# Patient Record
Sex: MALE | Race: BLACK OR AFRICAN AMERICAN | NOT HISPANIC OR LATINO | Employment: OTHER | URBAN - METROPOLITAN AREA
[De-identification: names, ages, dates, MRNs, and addresses within clinical notes are randomized per-mention and may not be internally consistent; named-entity substitution may affect disease eponyms.]

---

## 2019-02-12 PROBLEM — I50.23: Status: ACTIVE | Noted: 2019-02-12

## 2019-02-13 PROBLEM — I42.8 NON-ISCHEMIC CARDIOMYOPATHY: Status: ACTIVE | Noted: 2019-02-13

## 2019-05-15 ENCOUNTER — TELEPHONE (OUTPATIENT)
Dept: TRANSPLANT | Facility: CLINIC | Age: 54
End: 2019-05-15

## 2019-05-15 DIAGNOSIS — I50.9 CONGESTIVE HEART FAILURE, UNSPECIFIED HF CHRONICITY, UNSPECIFIED HEART FAILURE TYPE: Primary | ICD-10-CM

## 2019-05-17 NOTE — TELEPHONE ENCOUNTER
REFERRAL NOTE:    Saba Lott has been referred to the pre-heart transplant office for consideration for orthotopic heart transplantation by Miko Catalan. Patient's appointments have been scheduled for 05/28/19. Information was provided and questions were answered. AHF/ Transplant Handout, appointment letter and campus map was mailed to the patient. My contact information was given. Call PRN. Referring provider informed via fax of appointment as scheduled with Dr Shaikh.

## 2019-05-28 ENCOUNTER — INITIAL CONSULT (OUTPATIENT)
Dept: TRANSPLANT | Facility: CLINIC | Age: 54
End: 2019-05-28
Payer: MEDICARE

## 2019-05-28 ENCOUNTER — HOSPITAL ENCOUNTER (OUTPATIENT)
Dept: CARDIOLOGY | Facility: CLINIC | Age: 54
Discharge: HOME | End: 2019-05-28
Attending: INTERNAL MEDICINE
Payer: MEDICAID

## 2019-05-28 ENCOUNTER — HOSPITAL ENCOUNTER (OUTPATIENT)
Dept: PULMONOLOGY | Facility: CLINIC | Age: 54
Discharge: HOME | End: 2019-05-28
Payer: MEDICAID

## 2019-05-28 VITALS
SYSTOLIC BLOOD PRESSURE: 98 MMHG | HEIGHT: 66 IN | HEART RATE: 74 BPM | WEIGHT: 218 LBS | BODY MASS INDEX: 35.03 KG/M2 | DIASTOLIC BLOOD PRESSURE: 62 MMHG

## 2019-05-28 VITALS
BODY MASS INDEX: 31.82 KG/M2 | SYSTOLIC BLOOD PRESSURE: 103 MMHG | HEART RATE: 73 BPM | OXYGEN SATURATION: 97 % | DIASTOLIC BLOOD PRESSURE: 66 MMHG | BODY MASS INDEX: 32.59 KG/M2 | WEIGHT: 198 LBS | HEIGHT: 66 IN | WEIGHT: 202.81 LBS | HEIGHT: 66 IN

## 2019-05-28 DIAGNOSIS — I50.9 CONGESTIVE HEART FAILURE, UNSPECIFIED HF CHRONICITY, UNSPECIFIED HEART FAILURE TYPE: ICD-10-CM

## 2019-05-28 DIAGNOSIS — M79.604 PAIN OF RIGHT LOWER EXTREMITY: ICD-10-CM

## 2019-05-28 DIAGNOSIS — I10 ESSENTIAL HYPERTENSION: ICD-10-CM

## 2019-05-28 DIAGNOSIS — I50.23 SYSTOLIC DYSFUNCTION WITH ACUTE ON CHRONIC HEART FAILURE: ICD-10-CM

## 2019-05-28 DIAGNOSIS — I82.401 ACUTE DEEP VEIN THROMBOSIS (DVT) OF RIGHT LOWER EXTREMITY, UNSPECIFIED VEIN: ICD-10-CM

## 2019-05-28 DIAGNOSIS — I50.22 CHRONIC SYSTOLIC CONGESTIVE HEART FAILURE: Primary | ICD-10-CM

## 2019-05-28 DIAGNOSIS — I42.8 NON-ISCHEMIC CARDIOMYOPATHY: ICD-10-CM

## 2019-05-28 DIAGNOSIS — Z95.810 AICD (AUTOMATIC CARDIOVERTER/DEFIBRILLATOR) PRESENT: ICD-10-CM

## 2019-05-28 LAB
ASCENDING AORTA: 2.88 CM
AV INDEX (PROSTH): 0.66
AV MEAN GRADIENT: 1.74 MMHG
AV PEAK GRADIENT: 2.76 MMHG
AV VALVE AREA: 2.1 CM2
AV VELOCITY RATIO: 0.71
BSA FOR ECHO PROCEDURE: 2.15 M2
CV ECHO LV RWT: 0.23 CM
DOP CALC AO PEAK VEL: 0.83 M/S
DOP CALC AO VTI: 14.86 CM
DOP CALC LVOT AREA: 3.2 CM2
DOP CALC LVOT DIAMETER: 2.02 CM
DOP CALC LVOT PEAK VEL: 0.59 M/S
DOP CALC LVOT STROKE VOLUME: 31.2 CM3
DOP CALCLVOT PEAK VEL VTI: 9.74 CM
E WAVE DECELERATION TIME: 73.99 MSEC
E/A RATIO: 2.7
E/E' RATIO: 13.27
ECHO LV POSTERIOR WALL: 0.75 CM (ref 0.6–1.1)
FRACTIONAL SHORTENING: 7 % (ref 28–44)
INTERVENTRICULAR SEPTUM: 0.71 CM (ref 0.6–1.1)
IVRT: 0.07 MSEC
LA MAJOR: 5.6 CM
LA MINOR: 5.95 CM
LA WIDTH: 4.86 CM
LEFT ATRIUM SIZE: 4.32 CM
LEFT ATRIUM VOLUME INDEX: 49.6 ML/M2
LEFT ATRIUM VOLUME: 102.97 CM3
LEFT INTERNAL DIMENSION IN SYSTOLE: 6.13 CM (ref 2.1–4)
LEFT VENTRICLE DIASTOLIC VOLUME INDEX: 108.39 ML/M2
LEFT VENTRICLE DIASTOLIC VOLUME: 224.89 ML
LEFT VENTRICLE MASS INDEX: 95.5 G/M2
LEFT VENTRICLE SYSTOLIC VOLUME INDEX: 91.2 ML/M2
LEFT VENTRICLE SYSTOLIC VOLUME: 189.12 ML
LEFT VENTRICULAR INTERNAL DIMENSION IN DIASTOLE: 6.62 CM (ref 3.5–6)
LEFT VENTRICULAR MASS: 198.11 G
LV LATERAL E/E' RATIO: 10.43
LV SEPTAL E/E' RATIO: 18.25
MV PEAK A VEL: 0.27 M/S
MV PEAK E VEL: 0.73 M/S
PISA TR MAX VEL: 2.69 M/S
RA MAJOR: 5.04 CM
RA WIDTH: 4.74 CM
RIGHT VENTRICULAR END-DIASTOLIC DIMENSION: 4.86 CM
RV TISSUE DOPPLER FREE WALL SYSTOLIC VELOCITY 1 (APICAL 4 CHAMBER VIEW): 9.86 M/S
SINUS: 3.03 CM
STJ: 2.73 CM
TDI LATERAL: 0.07
TDI SEPTAL: 0.04
TDI: 0.06
TR MAX PG: 28.94 MMHG
TRICUSPID ANNULAR PLANE SYSTOLIC EXCURSION: 1.41 CM

## 2019-05-28 PROCEDURE — 93306 TTE W/DOPPLER COMPLETE: CPT | Mod: 26,S$PBB,NTX, | Performed by: INTERNAL MEDICINE

## 2019-05-28 PROCEDURE — 99999 PR PBB SHADOW E&M-EST. PATIENT-LVL III: ICD-10-PCS | Mod: PBBFAC,TXP,, | Performed by: INTERNAL MEDICINE

## 2019-05-28 PROCEDURE — 94618 PULMONARY STRESS TESTING: CPT | Mod: 26,S$PBB,NTX, | Performed by: INTERNAL MEDICINE

## 2019-05-28 PROCEDURE — C8929 TTE W OR WO FOL WCON,DOPPLER: HCPCS | Mod: PBBFAC,NTX | Performed by: INTERNAL MEDICINE

## 2019-05-28 PROCEDURE — 99204 OFFICE O/P NEW MOD 45 MIN: CPT | Mod: S$PBB,TXP,, | Performed by: INTERNAL MEDICINE

## 2019-05-28 PROCEDURE — 99213 OFFICE O/P EST LOW 20 MIN: CPT | Mod: PBBFAC,TXP,25 | Performed by: INTERNAL MEDICINE

## 2019-05-28 PROCEDURE — 99999 PR PBB SHADOW E&M-EST. PATIENT-LVL III: CPT | Mod: PBBFAC,TXP,, | Performed by: INTERNAL MEDICINE

## 2019-05-28 PROCEDURE — 94618 PULMONARY STRESS TESTING: CPT | Mod: PBBFAC,NTX | Performed by: INTERNAL MEDICINE

## 2019-05-28 PROCEDURE — 93306 TRANSTHORACIC ECHO (TTE) COMPLETE (CUPID ONLY): ICD-10-PCS | Mod: 26,S$PBB,NTX, | Performed by: INTERNAL MEDICINE

## 2019-05-28 PROCEDURE — 99204 PR OFFICE/OUTPT VISIT, NEW, LEVL IV, 45-59 MIN: ICD-10-PCS | Mod: S$PBB,TXP,, | Performed by: INTERNAL MEDICINE

## 2019-05-28 PROCEDURE — 94618 PULMONARY STRESS TESTING: ICD-10-PCS | Mod: 26,S$PBB,NTX, | Performed by: INTERNAL MEDICINE

## 2019-05-28 RX ORDER — CYCLOBENZAPRINE HCL 10 MG
TABLET ORAL
Status: ON HOLD | COMMUNITY
Start: 2019-03-14 | End: 2020-02-27 | Stop reason: HOSPADM

## 2019-05-28 RX ORDER — FAMOTIDINE 20 MG/1
TABLET, FILM COATED ORAL
Refills: 0 | Status: ON HOLD | COMMUNITY
Start: 2019-04-17 | End: 2020-02-27 | Stop reason: HOSPADM

## 2019-05-28 RX ORDER — OXYCODONE AND ACETAMINOPHEN 10; 325 MG/1; MG/1
TABLET ORAL
Refills: 0 | Status: ON HOLD | COMMUNITY
Start: 2019-04-13 | End: 2020-02-27 | Stop reason: HOSPADM

## 2019-05-28 RX ORDER — BISOPROLOL FUMARATE 5 MG/1
TABLET, FILM COATED ORAL
Refills: 6 | COMMUNITY
Start: 2019-05-08 | End: 2019-05-28

## 2019-05-28 NOTE — PROCEDURES
Saba Lott is a 53 y.o.  male patient, who presents for a 6 minute walk test ordered by Fernando Shaikh MD.  The diagnosis is Pre Heart Transplant; Cardiomyopathy; Congestive Heart Failure.  The patient's BMI is 32 kg/m2.  Predicted distance (lower limit of normal) is 432.72 meters.      Test Results:    The test was completed without stopping.  The total time walked was 360 seconds.  During walking, the patient reported:  No complaints.  The patient used no assistive devices during testing.     05/28/2019---------Distance: 304.8 meters (1000 feet)     O2 Sat % Supplemental Oxygen Heart Rate Blood Pressure Nan Scale   Pre-exercise  (Resting) 98 % Room Air 69 bpm 102/68 mmHg 2   During Exercise 98 % Room Air 76 bpm 102/69 mmHg 2   Post-exercise  (Recovery) 98 % Room Air  72 bpm       Recovery Time:  54 seconds    Performing nurse/tech:  Suleiman PRESTON      PREVIOUS STUDY:   The patient has not had a previous study.      CLINICAL INTERPRETATION:  Six minute walk distance is 304.8 meters (1000 feet) with light dyspnea.  During exercise, there was no desaturation while breathing room air.  Both blood pressure and heart rate remained stable with walking.  The patient did not report non-pulmonary symptoms during exercise.  No previous study performed.  Based upon age and body mass index, exercise capacity is less than predicted.

## 2019-05-28 NOTE — PROGRESS NOTES
Subjective:    Patient ID:  Saba Lott is a 53 y.o. male who presents for evaluation of Atrial Fibrillation (Consult/AHF options Ref by Dr. Catalan/Lillie Wall)      HPI    54 yo BM with history of nonischemic CMP with EF 15-20% with chronic heart failure who was seen in clinic on 2/12/19 for ICD evaluation. Chronic MARC - Class II-III and mild LE edema.    Hospital Course (synopsis of major diagnoses, care, treatment, and services provided during the course of the hospital stay):  -2/12 admit to CDU for diuresis with IV lasix  -IV abx   -CXR with suspected small left pleural effusion with associated left basilar atelectasis verus evolving airspace disease  -2/13 single chamber ICD implant; IV lasix decreased to BID  -2/16 add dobutamine, hold BB due to hypotension, no ACE-I or ARB due to recent HPI hypotension  -2/17 Lasix changed to PO  -2/18 dobutamine discontinued      Echo today (5/28/2019)    · Moderate left ventricular enlargement.  · Severely decreased left ventricular systolic function. The estimated ejection fraction is 20%  · Global hypokinetic wall motion.  · Grade III (severe) left ventricular diastolic dysfunction consistent with restrictive physiology.  · Severe left atrial enlargement.  · Moderate right ventricular enlargement.  · Low normal right ventricular systolic function.  · Mild right atrial enlargement.  · Moderate mitral regurgitation.  · Mild to moderate tricuspid regurgitation.  Review of Systems   Constitution: Negative for chills, decreased appetite, diaphoresis, fever, malaise/fatigue, night sweats, weight gain and weight loss.   Cardiovascular: Positive for dyspnea on exertion and leg swelling. Negative for chest pain, claudication, cyanosis, irregular heartbeat, near-syncope, orthopnea and palpitations.   Respiratory: Negative for cough, hemoptysis, shortness of breath, sleep disturbances due to breathing, snoring, sputum production and wheezing.    Gastrointestinal: Negative for  abdominal pain and diarrhea.   Neurological: Negative for weakness.        Objective:    Physical Exam   Constitutional: He is oriented to person, place, and time. He appears well-developed and well-nourished.   HENT:   Head: Normocephalic and atraumatic.   Eyes: Pupils are equal, round, and reactive to light. Conjunctivae and EOM are normal.   Neck: Normal range of motion. Neck supple. No JVD present.   Cardiovascular: Normal rate and regular rhythm. PMI is displaced. Exam reveals no gallop and no friction rub.   No murmur heard.  Pulmonary/Chest: Breath sounds normal. No respiratory distress. He has no wheezes. He has no rales. He exhibits no tenderness.   Abdominal: Soft. Bowel sounds are normal. He exhibits no distension and no mass. There is no hepatosplenomegaly, splenomegaly or hepatomegaly. There is no tenderness. There is no rebound, no guarding and no CVA tenderness.   No hepatoslenomegaly   Musculoskeletal: Normal range of motion. He exhibits no edema or tenderness.   Neurological: He is alert and oriented to person, place, and time. He has normal reflexes. No cranial nerve deficit. He exhibits normal muscle tone. Coordination normal.   Skin: Skin is warm and dry.   Psychiatric: He has a normal mood and affect.         Assessment:       1. Chronic systolic congestive heart failure    2. Non-ischemic cardiomyopathy    3. Systolic dysfunction with acute on chronic heart failure    4. Pain of right lower extremity    5. Essential hypertension    6. AICD (automatic cardioverter/defibrillator) present    7. Acute deep vein thrombosis (DVT) of right lower extremity, unspecified vein         Plan:         HFrEF FC II III NYHA poor insight of his problem NOT on ACE ARB because of hyopetension    WE will order CPX and then decide future management    Seems to be in 2 betablockers (should be on COreg only) DC bisoprolol      RTC 1 month

## 2019-05-28 NOTE — LETTER
May 28, 2019        Miko Catalan  [address] [facility]  [place] [address]  Phone: [phone]  Fax: [phone]             [facility] [address] [place] [address]  Phone: [phone]   Patient: Saba Lott   MR Number: [id number]   YOB: 1965   Date of Visit: 5/28/2019       Dear Dr. Miko Catalan    Thank you for referring Saba Lott to me for evaluation. Attached you will find relevant portions of my assessment and plan of care.    If you have questions, please do not hesitate to call me. I look forward to following Saba Lott along with you.    Sincerely,    Fernando Shaikh MD    Enclosure    If you would like to receive this communication electronically, please contact [email or web address] or [phone] to request Secure[employer] Link access.    [employer]ure[employer] Link is a tool which provides read-only access to select patient information with whom you have a relationship. Its easy to use and provides real time access to review your patients record including encounter summaries, notes, results, and demographic information.    If you feel you have received this communication in error or would no longer like to receive these types of communications, please e-mail [email or web address]

## 2019-06-28 ENCOUNTER — TELEPHONE (OUTPATIENT)
Dept: TRANSPLANT | Facility: CLINIC | Age: 54
End: 2019-06-28

## 2019-06-28 NOTE — TELEPHONE ENCOUNTER
Patient missed followup appointment yesterday. Attempted to call patient to discuss and rescheduled. However, patient's vm full. Called backup number which was patient's father. He stated he would let patient know to call us back to reschedule.

## 2019-12-13 ENCOUNTER — TELEPHONE (OUTPATIENT)
Dept: TRANSPLANT | Facility: CLINIC | Age: 54
End: 2019-12-13

## 2019-12-13 NOTE — LETTER
[facility]  [address]  [facility] [address]  Phone: [phone]     01/08/19    Dear Dr ROSI Catalan,  Saba Lott was referred to the [facility] at [facility]. Multiple attempts have been made to contact and/ or schedule the patient for a follow up visit in our clinic. These attempts have been unsuccessful. We are closing the referral at this time;  however we welcome you to re-refer the patient as appropriate. Should you decide to place another referral, please fax a new referral request along with updated records to [phone]. A copy of this letter will also be sent to the patient at the address that we have on file. For questions, please don't hesitate to call us at [phone].    Thanks in advance,   Toni Ruano MD, [facility]  Medical Director, Pre-Transplant Program

## 2019-12-13 NOTE — TELEPHONE ENCOUNTER
Called to follow up and see if patient would like to schedule an appointment. Spoke with patient's wife who stated the patient is in the hospital at this time and she will talk to him and let us know. Contact information given.

## 2019-12-13 NOTE — TELEPHONE ENCOUNTER
----- Message from Toni Ruano MD sent at 12/10/2019  7:49 AM CST -----  We last saw him in May 2019   He was supposed to followup in one month with CPX but did not   Can we call him and see if he is coming back?

## 2019-12-18 NOTE — TELEPHONE ENCOUNTER
Called to check on and see if ready to schedule follow up appointment. No answer. Unable to leave message as voicemail was full.

## 2020-01-08 NOTE — TELEPHONE ENCOUNTER
Called to see if would like to schedule an appointment. Mailbox full and unable to leave message. On other number recording said call could not be completed at this time.

## 2020-01-15 ENCOUNTER — HOSPITAL ENCOUNTER (INPATIENT)
Facility: HOSPITAL | Age: 55
LOS: 43 days | Discharge: HOME HEALTH | DRG: 001 | End: 2020-02-27
Attending: INTERNAL MEDICINE | Admitting: INTERNAL MEDICINE
Payer: MEDICARE

## 2020-01-15 DIAGNOSIS — J95.89 ACUTE RESPIRATORY INSUFFICIENCY, POSTOPERATIVE: ICD-10-CM

## 2020-01-15 DIAGNOSIS — Z87.891 HISTORY OF TOBACCO ABUSE: ICD-10-CM

## 2020-01-15 DIAGNOSIS — Z01.818 PRE-TRANSPLANT EVALUATION FOR HEART TRANSPLANT: ICD-10-CM

## 2020-01-15 DIAGNOSIS — I50.9 LOW OUTPUT HEART FAILURE: ICD-10-CM

## 2020-01-15 DIAGNOSIS — Z95.811 PRESENCE OF LEFT VENTRICULAR ASSIST DEVICE (LVAD): ICD-10-CM

## 2020-01-15 DIAGNOSIS — R73.9 ACUTE HYPERGLYCEMIA: ICD-10-CM

## 2020-01-15 DIAGNOSIS — I50.9 HEART FAILURE: ICD-10-CM

## 2020-01-15 DIAGNOSIS — N17.9 AKI (ACUTE KIDNEY INJURY): ICD-10-CM

## 2020-01-15 DIAGNOSIS — R17 ELEVATED BILIRUBIN: ICD-10-CM

## 2020-01-15 DIAGNOSIS — I50.9 CHF (CONGESTIVE HEART FAILURE): ICD-10-CM

## 2020-01-15 DIAGNOSIS — D62 ANEMIA DUE TO ACUTE BLOOD LOSS: ICD-10-CM

## 2020-01-15 DIAGNOSIS — R09.89 ABNORMAL PULSE: ICD-10-CM

## 2020-01-15 DIAGNOSIS — L29.9 PRURITUS: ICD-10-CM

## 2020-01-15 DIAGNOSIS — Z76.82 ORGAN TRANSPLANT CANDIDATE: ICD-10-CM

## 2020-01-15 DIAGNOSIS — Z78.9 CENTRAL VENOUS CATHETER IN PLACE: ICD-10-CM

## 2020-01-15 DIAGNOSIS — I50.42 CHRONIC COMBINED SYSTOLIC AND DIASTOLIC CONGESTIVE HEART FAILURE: ICD-10-CM

## 2020-01-15 DIAGNOSIS — R57.0 CARDIOGENIC SHOCK: ICD-10-CM

## 2020-01-15 DIAGNOSIS — I50.43 ACUTE ON CHRONIC COMBINED SYSTOLIC AND DIASTOLIC HEART FAILURE: ICD-10-CM

## 2020-01-15 DIAGNOSIS — F10.11 HISTORY OF ALCOHOL ABUSE: ICD-10-CM

## 2020-01-15 DIAGNOSIS — Z45.09 ENCOUNTER FOR MANAGEMENT OF INTRA-AORTIC BALLOON PUMP: ICD-10-CM

## 2020-01-15 DIAGNOSIS — I42.8 NON-ISCHEMIC CARDIOMYOPATHY: ICD-10-CM

## 2020-01-15 DIAGNOSIS — Z51.5 PALLIATIVE CARE ENCOUNTER: ICD-10-CM

## 2020-01-15 DIAGNOSIS — E66.01 MORBID OBESITY: ICD-10-CM

## 2020-01-15 DIAGNOSIS — Z71.89 GOALS OF CARE, COUNSELING/DISCUSSION: ICD-10-CM

## 2020-01-15 DIAGNOSIS — D75.839 THROMBOCYTOSIS: ICD-10-CM

## 2020-01-15 DIAGNOSIS — Z95.810 AICD (AUTOMATIC CARDIOVERTER/DEFIBRILLATOR) PRESENT: ICD-10-CM

## 2020-01-15 DIAGNOSIS — I50.43 ACUTE ON CHRONIC SYSTOLIC AND DIASTOLIC HEART FAILURE, NYHA CLASS 4: ICD-10-CM

## 2020-01-15 DIAGNOSIS — I82.401 ACUTE DEEP VEIN THROMBOSIS (DVT) OF RIGHT LOWER EXTREMITY, UNSPECIFIED VEIN: ICD-10-CM

## 2020-01-15 DIAGNOSIS — I10 ESSENTIAL HYPERTENSION: ICD-10-CM

## 2020-01-15 DIAGNOSIS — Z79.01 ANTICOAGULATION MONITORING, INR RANGE 2-3: ICD-10-CM

## 2020-01-15 DIAGNOSIS — I82.421 DEEP VEIN THROMBOSIS (DVT) OF ILIAC VEIN OF RIGHT LOWER EXTREMITY, UNSPECIFIED CHRONICITY: ICD-10-CM

## 2020-01-15 DIAGNOSIS — Z71.89 ADVANCE CARE PLANNING: ICD-10-CM

## 2020-01-15 DIAGNOSIS — D72.829 LEUKOCYTOSIS, UNSPECIFIED TYPE: ICD-10-CM

## 2020-01-15 DIAGNOSIS — E44.0 MODERATE MALNUTRITION: ICD-10-CM

## 2020-01-15 DIAGNOSIS — Z95.811 LVAD (LEFT VENTRICULAR ASSIST DEVICE) PRESENT: ICD-10-CM

## 2020-01-15 LAB
ABO + RH BLD: NORMAL
ALBUMIN SERPL BCP-MCNC: 3 G/DL (ref 3.5–5.2)
ALLENS TEST: ABNORMAL
ALP SERPL-CCNC: 115 U/L (ref 55–135)
ALT SERPL W/O P-5'-P-CCNC: 19 U/L (ref 10–44)
ANION GAP SERPL CALC-SCNC: 12 MMOL/L (ref 8–16)
APTT BLDCRRT: 26.1 SEC (ref 21–32)
AST SERPL-CCNC: 26 U/L (ref 10–40)
BACTERIA #/AREA URNS AUTO: ABNORMAL /HPF
BASOPHILS # BLD AUTO: 0.05 K/UL (ref 0–0.2)
BASOPHILS NFR BLD: 0.7 % (ref 0–1.9)
BILIRUB SERPL-MCNC: 1.5 MG/DL (ref 0.1–1)
BILIRUB UR QL STRIP: NEGATIVE
BILIRUB UR QL STRIP: NEGATIVE
BLD GP AB SCN CELLS X3 SERPL QL: NORMAL
BNP SERPL-MCNC: 3525 PG/ML (ref 0–99)
BUN SERPL-MCNC: 33 MG/DL (ref 6–20)
CALCIUM SERPL-MCNC: 8.8 MG/DL (ref 8.7–10.5)
CHLORIDE SERPL-SCNC: 100 MMOL/L (ref 95–110)
CLARITY UR REFRACT.AUTO: ABNORMAL
CLARITY UR REFRACT.AUTO: ABNORMAL
CO2 SERPL-SCNC: 22 MMOL/L (ref 23–29)
COLOR UR AUTO: YELLOW
COLOR UR AUTO: YELLOW
CREAT SERPL-MCNC: 3 MG/DL (ref 0.5–1.4)
DIFFERENTIAL METHOD: ABNORMAL
EOSINOPHIL # BLD AUTO: 0.1 K/UL (ref 0–0.5)
EOSINOPHIL NFR BLD: 1 % (ref 0–8)
ERYTHROCYTE [DISTWIDTH] IN BLOOD BY AUTOMATED COUNT: 17 % (ref 11.5–14.5)
EST. GFR  (AFRICAN AMERICAN): 25.8 ML/MIN/1.73 M^2
EST. GFR  (NON AFRICAN AMERICAN): 22.3 ML/MIN/1.73 M^2
GLUCOSE SERPL-MCNC: 82 MG/DL (ref 70–110)
GLUCOSE UR QL STRIP: NEGATIVE
GLUCOSE UR QL STRIP: NEGATIVE
HCO3 UR-SCNC: 24.9 MMOL/L (ref 24–28)
HCT VFR BLD AUTO: 36.8 % (ref 40–54)
HGB BLD-MCNC: 12 G/DL (ref 14–18)
HGB UR QL STRIP: ABNORMAL
HGB UR QL STRIP: ABNORMAL
IMM GRANULOCYTES # BLD AUTO: 0.03 K/UL (ref 0–0.04)
IMM GRANULOCYTES NFR BLD AUTO: 0.4 % (ref 0–0.5)
INR PPP: 2 (ref 0.8–1.2)
KETONES UR QL STRIP: NEGATIVE
KETONES UR QL STRIP: NEGATIVE
LEUKOCYTE ESTERASE UR QL STRIP: ABNORMAL
LEUKOCYTE ESTERASE UR QL STRIP: ABNORMAL
LYMPHOCYTES # BLD AUTO: 1.1 K/UL (ref 1–4.8)
LYMPHOCYTES NFR BLD: 14.3 % (ref 18–48)
MAGNESIUM SERPL-MCNC: 1.6 MG/DL (ref 1.6–2.6)
MCH RBC QN AUTO: 27.5 PG (ref 27–31)
MCHC RBC AUTO-ENTMCNC: 32.6 G/DL (ref 32–36)
MCV RBC AUTO: 84 FL (ref 82–98)
MICROSCOPIC COMMENT: ABNORMAL
MONOCYTES # BLD AUTO: 0.7 K/UL (ref 0.3–1)
MONOCYTES NFR BLD: 9.1 % (ref 4–15)
NEUTROPHILS # BLD AUTO: 5.7 K/UL (ref 1.8–7.7)
NEUTROPHILS NFR BLD: 74.5 % (ref 38–73)
NITRITE UR QL STRIP: NEGATIVE
NITRITE UR QL STRIP: NEGATIVE
NRBC BLD-RTO: 0 /100 WBC
PCO2 BLDA: 41.6 MMHG (ref 35–45)
PH SMN: 7.38 [PH] (ref 7.35–7.45)
PH UR STRIP: 5 [PH] (ref 5–8)
PH UR STRIP: 5 [PH] (ref 5–8)
PHOSPHATE SERPL-MCNC: 5.2 MG/DL (ref 2.7–4.5)
PLATELET # BLD AUTO: 207 K/UL (ref 150–350)
PMV BLD AUTO: 10.9 FL (ref 9.2–12.9)
PO2 BLDA: 31 MMHG (ref 40–60)
POC BE: 0 MMOL/L
POC SATURATED O2: 59 % (ref 95–100)
POC TCO2: 26 MMOL/L (ref 24–29)
POTASSIUM SERPL-SCNC: 4.3 MMOL/L (ref 3.5–5.1)
PROT SERPL-MCNC: 6.4 G/DL (ref 6–8.4)
PROT UR QL STRIP: NEGATIVE
PROT UR QL STRIP: NEGATIVE
PROTHROMBIN TIME: 18.9 SEC (ref 9–12.5)
RBC # BLD AUTO: 4.36 M/UL (ref 4.6–6.2)
RBC #/AREA URNS AUTO: 45 /HPF (ref 0–4)
SAMPLE: ABNORMAL
SITE: ABNORMAL
SODIUM SERPL-SCNC: 134 MMOL/L (ref 136–145)
SP GR UR STRIP: 1 (ref 1–1.03)
SP GR UR STRIP: 1 (ref 1–1.03)
SQUAMOUS #/AREA URNS AUTO: 0 /HPF
URN SPEC COLLECT METH UR: ABNORMAL
URN SPEC COLLECT METH UR: ABNORMAL
WBC # BLD AUTO: 7.68 K/UL (ref 3.9–12.7)
WBC #/AREA URNS AUTO: 3 /HPF (ref 0–5)

## 2020-01-15 PROCEDURE — 80053 COMPREHEN METABOLIC PANEL: CPT

## 2020-01-15 PROCEDURE — 85730 THROMBOPLASTIN TIME PARTIAL: CPT

## 2020-01-15 PROCEDURE — 25000003 PHARM REV CODE 250: Performed by: STUDENT IN AN ORGANIZED HEALTH CARE EDUCATION/TRAINING PROGRAM

## 2020-01-15 PROCEDURE — 85610 PROTHROMBIN TIME: CPT

## 2020-01-15 PROCEDURE — 36415 COLL VENOUS BLD VENIPUNCTURE: CPT

## 2020-01-15 PROCEDURE — 86901 BLOOD TYPING SEROLOGIC RH(D): CPT

## 2020-01-15 PROCEDURE — 99900035 HC TECH TIME PER 15 MIN (STAT)

## 2020-01-15 PROCEDURE — 27000221 HC OXYGEN, UP TO 24 HOURS

## 2020-01-15 PROCEDURE — 83735 ASSAY OF MAGNESIUM: CPT

## 2020-01-15 PROCEDURE — 25000242 PHARM REV CODE 250 ALT 637 W/ HCPCS: Performed by: STUDENT IN AN ORGANIZED HEALTH CARE EDUCATION/TRAINING PROGRAM

## 2020-01-15 PROCEDURE — 63600175 PHARM REV CODE 636 W HCPCS: Performed by: STUDENT IN AN ORGANIZED HEALTH CARE EDUCATION/TRAINING PROGRAM

## 2020-01-15 PROCEDURE — 85025 COMPLETE CBC W/AUTO DIFF WBC: CPT

## 2020-01-15 PROCEDURE — 27100094 HC IABP, SET-UP: Mod: NTX

## 2020-01-15 PROCEDURE — 20000000 HC ICU ROOM

## 2020-01-15 PROCEDURE — 27000202 HC IABP, ADD'L DAY: Mod: NTX

## 2020-01-15 PROCEDURE — 99291 CRITICAL CARE FIRST HOUR: CPT | Mod: GC,,, | Performed by: INTERNAL MEDICINE

## 2020-01-15 PROCEDURE — 81001 URINALYSIS AUTO W/SCOPE: CPT

## 2020-01-15 PROCEDURE — 82803 BLOOD GASES ANY COMBINATION: CPT

## 2020-01-15 PROCEDURE — 83880 ASSAY OF NATRIURETIC PEPTIDE: CPT

## 2020-01-15 PROCEDURE — 99291 PR CRITICAL CARE, E/M 30-74 MINUTES: ICD-10-PCS | Mod: GC,,, | Performed by: INTERNAL MEDICINE

## 2020-01-15 PROCEDURE — 84100 ASSAY OF PHOSPHORUS: CPT

## 2020-01-15 RX ORDER — ALBUTEROL SULFATE 90 UG/1
2 AEROSOL, METERED RESPIRATORY (INHALATION) EVERY 6 HOURS PRN
Status: DISCONTINUED | OUTPATIENT
Start: 2020-01-15 | End: 2020-02-06

## 2020-01-15 RX ORDER — MORPHINE SULFATE 2 MG/ML
4 INJECTION, SOLUTION INTRAMUSCULAR; INTRAVENOUS EVERY 4 HOURS PRN
Status: DISCONTINUED | OUTPATIENT
Start: 2020-01-15 | End: 2020-01-17

## 2020-01-15 RX ORDER — PANTOPRAZOLE SODIUM 40 MG/1
40 TABLET, DELAYED RELEASE ORAL DAILY
Status: DISCONTINUED | OUTPATIENT
Start: 2020-01-16 | End: 2020-01-17

## 2020-01-15 RX ORDER — SODIUM CHLORIDE 0.9 % (FLUSH) 0.9 %
10 SYRINGE (ML) INJECTION
Status: DISCONTINUED | OUTPATIENT
Start: 2020-01-15 | End: 2020-02-06

## 2020-01-15 RX ORDER — DIPHENHYDRAMINE HCL 25 MG
25 CAPSULE ORAL ONCE
Status: COMPLETED | OUTPATIENT
Start: 2020-01-15 | End: 2020-01-15

## 2020-01-15 RX ORDER — POLYETHYLENE GLYCOL 3350 17 G/17G
17 POWDER, FOR SOLUTION ORAL DAILY
Status: DISCONTINUED | OUTPATIENT
Start: 2020-01-16 | End: 2020-02-06

## 2020-01-15 RX ORDER — BISACODYL 10 MG
10 SUPPOSITORY, RECTAL RECTAL DAILY PRN
Status: DISCONTINUED | OUTPATIENT
Start: 2020-01-15 | End: 2020-02-06

## 2020-01-15 RX ORDER — ATORVASTATIN CALCIUM 20 MG/1
40 TABLET, FILM COATED ORAL DAILY
Status: DISCONTINUED | OUTPATIENT
Start: 2020-01-16 | End: 2020-02-06

## 2020-01-15 RX ORDER — ACETAMINOPHEN 325 MG/1
650 TABLET ORAL EVERY 4 HOURS PRN
Status: DISCONTINUED | OUTPATIENT
Start: 2020-01-15 | End: 2020-01-17

## 2020-01-15 RX ORDER — HEPARIN SODIUM,PORCINE/D5W 25000/250
12 INTRAVENOUS SOLUTION INTRAVENOUS CONTINUOUS
Status: DISCONTINUED | OUTPATIENT
Start: 2020-01-15 | End: 2020-02-06

## 2020-01-15 RX ORDER — DOBUTAMINE HYDROCHLORIDE 400 MG/100ML
5 INJECTION, SOLUTION INTRAVENOUS CONTINUOUS
Status: DISCONTINUED | OUTPATIENT
Start: 2020-01-15 | End: 2020-02-06

## 2020-01-15 RX ORDER — OXYCODONE HYDROCHLORIDE 5 MG/1
5 TABLET ORAL EVERY 4 HOURS PRN
Status: DISCONTINUED | OUTPATIENT
Start: 2020-01-15 | End: 2020-01-17

## 2020-01-15 RX ORDER — FUROSEMIDE 10 MG/ML
80 INJECTION INTRAMUSCULAR; INTRAVENOUS ONCE
Status: COMPLETED | OUTPATIENT
Start: 2020-01-15 | End: 2020-01-15

## 2020-01-15 RX ADMIN — MORPHINE SULFATE 4 MG: 2 INJECTION, SOLUTION INTRAMUSCULAR; INTRAVENOUS at 09:01

## 2020-01-15 RX ADMIN — HEPARIN SODIUM AND DEXTROSE 12 UNITS/KG/HR: 10000; 5 INJECTION INTRAVENOUS at 10:01

## 2020-01-15 RX ADMIN — ALBUTEROL SULFATE 2 PUFF: 90 AEROSOL, METERED RESPIRATORY (INHALATION) at 10:01

## 2020-01-15 RX ADMIN — FUROSEMIDE 20 MG/HR: 10 INJECTION, SOLUTION INTRAMUSCULAR; INTRAVENOUS at 10:01

## 2020-01-15 RX ADMIN — DOBUTAMINE IN DEXTROSE 5 MCG/KG/MIN: 200 INJECTION, SOLUTION INTRAVENOUS at 09:01

## 2020-01-15 RX ADMIN — DIPHENHYDRAMINE HYDROCHLORIDE 25 MG: 25 CAPSULE ORAL at 10:01

## 2020-01-15 RX ADMIN — FUROSEMIDE 80 MG: 10 INJECTION, SOLUTION INTRAMUSCULAR; INTRAVENOUS at 10:01

## 2020-01-15 NOTE — LETTER
[address]  [facility] [address]  Phone: [phone]  Fax: [phone]                                  02/27/2020  [facility]  [address] [address]      To Whom It May Concern:    This letter is to inform you that one of our patients will be discharged to your community.  We want to let you know because this patient has special health needs.     This patient (whose information appears below) has a Heartmate 3 Left Ventricular Assist Device (LVAD) or blood pump.   The device is implanted inside the patients native heart.  It takes over pumping so that the patients organs and tissues get the oxygen-rich blood they need.  The VAD is a life-sustaining device.  The patients information is as follows:    Saba Lott  54 y.o. 1965  [address] [facility] [address]    This device runs on batteries and on AC power.  If the patient should present to you during a medical emergency, please contact us immediately at ([phone]) and ask to page VAD coordinator on call for VAD-specific emergency instructions. THIS PATIENT CAN HAVE CHEST COMPRESSIONS if medically necessary and CAN BE Defibrillated/DC Cardioverted without disconnecting the controller. Please DO NOT use a ALIYA  Device.  This device is fluid dependent so  cc to start if required.   THIS PATIENT MAY NOT HAVE A BLOOD PRESSURE OR A PALPABLE PULSE.     For additional information, visit [email or web address] for further VAD education materials.       Please contact one of the VAD coordinators with any further questions or concerns.  Umm Parish, RN, CCRN     [phone]   Cindy Armstrong, RN--[phone]   FRIEDA MarinN, RN--[phone]   JESSICA Cerda, RN, CCRN [phone]   VAD Office Fax: [phone]  Sincerely,      Zulma Floyd M.D.  Medical Director, Mechanical Circulatory Device Support Program  Section of Cardiomyopathy & Heart Transplantation

## 2020-01-15 NOTE — LETTER
[address]  [facility] [address]  Phone: [phone]  Fax: [phone]            02/27/2020  Dr. Jing Peña  [address]  [place] [address]    Dear Dr. Peña,    One of our patients was recently implanted with a Left Ventricular Assist Device (LVAD).  This device takes over the pumping function of the native hearts left ventricle.    I am contacting you because this patient (whose information appears below) is about to be discharged from the hospital and return home. This patient has identified you as the Primary Care Physician.  If the patient should present to you during a medical emergency, please contact us immediately for LVAD-specific emergency instructions.    Saba Lott  54 y.o. 1965  [address] [facility] [address]    I have included educational material regarding the LVAD for you and your staff.  This patient is fully trained to handle the LVAD properly.     For additional information, visit [email or web address] for further VAD education materials.       Please contact one of the VAD coordinators with any further questions or concerns.  Umm Parish, RN, CCRN     [phone]   Cindy Armstrong, RN--[phone]   FRIEDA MarinN, RN--[phone]   FRIEDA CerdaN, RN, CCRN [phone]   VAD Office Fax: [phone]  Sincerely,      Zulma Floyd M.D.  Medical Director, Mechanical Circulatory Device Support Program  Section of Cardiomyopathy & Heart Transplantation

## 2020-01-15 NOTE — LETTER
[address]  [facility] [address]  Phone: [phone]  Fax: [phone]          02/27/2020    [facility]        To Whom It May Concern:    This letter is to inform you that one of our patients will be discharged into your community.  We want to let you know because this patient has special healthcare needs.   As a potential , you may appreciate knowing in advance how to respond to potential emergencies concerning this patient.    The patient (whose contact information appears below) has a Heartmate 3 Left Ventricular Assist System (LVAD) or blood pump.  The device is implanted alongside the patients native heart.  It takes over the pumping function of the patients sick or weakened heart so that the patients lungs, organs, and tissues get the oxygen-rich blood they need.  The LVAD is a life-sustaining device. The patients information is as follows:    Saba Lott  54 y.o. 1965  [address] [facility] [address]    This device runs on batteries and AC power.   There is no hand-pumping.  There is a back up controller, batteries, battery charger and a Mobile power unit (MPU). If called to patient house, please take the emergency bag with the extra controller, batteries, and clips with patient to the hospital also, PAGE the VAD COORDINATOR on call immediately via the  [phone] for VAD-specific emergency instructions. This patient CAN have chest compressions and CAN be defibrillated/DC Cardioverted. Please DO NOT use a ALIYA Device. This device is fluid dependent so you may need to administer  cc to start.  THIS PATIENT MAY NOT HAVE A BLOOD PRESSURE OR A PALPABLE PULSE.  For additional information, visit [email or web address] for further VAD education materials.       Please contact one of the VAD coordinators with any further questions or concerns.  Umm Parish RN, CCRN     [phone]   Cindy Armstrong RN--[phone]   Lilly  Jennifer, FRIEDAN, RN--[phone]   JESSICA Cerda, RN, CCRN [phone]   VAD Office Fax: [phone]  Sincerely,      Zulma Floyd M.D.  Medical Director, Mechanical Circulatory Device Support Program  Section of Cardiomyopathy & Heart Transplantation

## 2020-01-15 NOTE — LETTER
[address]  [facility] [address]  Phone: [phone]  Fax: [phone]           02/27/2020  [facility] [address] [place] [address]    Dear Dr.Robert Catalan,    One of your patients was implanted with a Heartmate 3 Left Ventricular Assist Device (LVAD).  This device takes over the pumping function of the native hearts left ventricle.    I am contacting you because this patient (whose information appears below) is about to be discharged from the hospital and return home. This patient has identified you as the referring cardiologist.   If the patient should present to you during a medical emergency, please call [phone].    Saba Millser  54 y.o. 1965  [address] [address]                  I have included educational material regarding the LVAD for you and your staff.  This patient is fully trained to handle the LVAD properly.       For additional information, visit [email or web address] for further VAD education materials.       Please contact one of the VAD coordinators with any further questions or concerns.  Umm Parish, RN, CCRN     [phone]   Cindy Armstrong, RN--[phone]   Ana Maria Saunders, FRIEDAN, RN, CCRN [phone]   FRIEDA MarinN, RN--[phone]   FRIEDA CerdaN, RN, CCRN [phone]   VAD Office Fax: [phone]  Sincerely,      Zulma Floyd M.D.  Medical Director, Mechanical Circulatory Device Support Program  Section of Cardiomyopathy & Heart Transplantation

## 2020-01-15 NOTE — Clinical Note
The PA catheter is repositioned to the main pulmonary artery. Hemodynamics performed. Cardiac output obtained at 4 L/min. O2 saturation measured at 59%.

## 2020-01-15 NOTE — LETTER
[address]  [facility] [address]  Phone: [phone]  Fax: [phone]                    02/27/2020    [facility]  [address] [place] [address]. [address]    To Whom It May Concern:    This letter is to inform you that one of our patients will be discharged into your community.  We want to let you know because this patient has special healthcare needs.   As a potential , you may appreciate knowing in advance how to respond to potential emergencies concerning this patient.    The patient (whose contact information appears below) has a Heartmate 3 Left Ventricular Assist System (LVAD) or blood pump.   The device is implanted alongside the patients native heart.  It takes over the pumping function of the patients sick or weakened heart so that the patients lungs, organs, and tissues get the oxygen-rich blood they need.  The LVAD is a life-sustaining device. The patients information is as follows:    Saba Lott  54 y.o. 1965  [address] [facility] [address]    This device runs on batteries and AC power.   There is no hand-pumping.  There is a back up controller, batteries, battery charger and a Mobile power unit (MPU)/Power Module. If called to patient house, please take the emergency bag with the extra controller, batteries, and clips with patient to the hospital also, PAGE the VAD COORDINATOR on call immediately via the  [phone] for VAD-specific emergency instructions. This patient CAN have chest compressions and CAN be defibrillated/DC Cardioverted. Please DO NOT use a Gus Device. This device is fluid dependent so you may need to administer  cc to start.  THIS PATIENT MAY NOT HAVE A BLOOD PRESSURE OR A PALPABLE PULSE.  For additional information, visit [email or web address] for further VAD education materials.       Please contact one of the VAD coordinators with any further questions or concerns.  Umm Parish RN, CCRN      [phone]   Cindy Armstrong, RN--[phone]   Ana Maria Saunders, JESSICA, RN, CCRN [phone]   FRIEDA MarinN, RN--[phone]   JESSICA Cerda, RN, CCRN [phone]   VAD Office Fax: [phone]  Sincerely,      Zulma Floyd M.D.  Medical Director, Mechanical Circulatory Device Support Program  Section of Cardiomyopathy & Heart Transplantation

## 2020-01-15 NOTE — LETTER
[address]  [facility] [address]  Phone: [phone]  Fax: [phone]                                  02/27/2020  [facility]   [address]  [place] [address]      To Whom It May Concern:    This letter is to inform you that one of our patients will be discharged to your community.  We want to let you know because this patient has special health needs.     This patient (whose information appears below) has a Heartmate 3 Left Ventricular Assist Device (LVAD) or blood pump.   The device is implanted inside the patients native heart.  It takes over pumping so that the patients organs and tissues get the oxygen-rich blood they need.  The VAD is a life-sustaining device.  The patients information is as follows:    Saba Lott  54 y.o. 1965  [address] [facility] [address]    This device runs on batteries and on AC power.  If the patient should present to you during a medical emergency, please contact us immediately at ([phone]) and ask to page VAD coordinator on call for VAD-specific emergency instructions. THIS PATIENT CAN HAVE CHEST COMPRESSIONS if medically necessary and CAN BE Defibrillated/DC Cardioverted without disconnecting the controller. Please DO NOT use a ALIYA  Device.  This device is fluid dependent so  cc to start if required.   THIS PATIENT MAY NOT HAVE A BLOOD PRESSURE OR A PALPABLE PULSE.     For additional information, visit [email or web address] for further VAD education materials.       Please contact one of the VAD coordinators with any further questions or concerns.  Umm Parish, RN, CCRN     [phone]   Cindy Armstrong, RN--[phone]   FRIEDA MarinN, RN--[phone]   JESSICA Cerda, RN, CCRN [phone]   VAD Office Fax: [phone]  Sincerely,      Zulma Floyd M.D.  Medical Director, Mechanical Circulatory Device Support Program  Section of Cardiomyopathy & Heart Transplantation

## 2020-01-15 NOTE — Clinical Note
The PA catheter is repositioned to the pulmonary wedge. Hemodynamics performed. Unable to obtain a true wedge sat, but confirmed by fluoro.

## 2020-01-15 NOTE — LETTER
[address]  [facility] [address]  Phone: [phone]  Fax: [phone]            02/27/2020        [facility]  [address] [place] [address]    To Whom It May Concern,     This letter is to inform you that we anticipate that one of our patients will be discharged into your community.  We want to let you know because this patient has special healthcare needs.      The patient (whose contact information appears below) has a Heartmate 3 Left Ventricular Assist Device (LVAD) or blood pump.  The device is surgically implanted alongside the patients native heart.  It takes over the pumping function of the patients sick or weakened heart so that the patients lungs, organs, and tissues get the oxygen-rich blood they need.  The LVAD is a life-sustaining device. The patients information is as follows:    Saba Lott  54 y.o. 1965  [address] [facility] [address]    This device runs on either AC power or external batteries.  The external batteries are worn in a double holster located on both the right and left side of the body.  (See photo). The holster cannot be removed.  The Heartmate  will be connected between the patient and wall unit or external batteries. THIS PATIENT MAY NOT HAVE A PALPABLE PULSE.    For additional information, visit [email or web address] for further VAD education materials.       Please contact one of the VAD coordinators with any further questions or concerns.  Umm Parish, RN, CCRN     [phone]   Cindy Armstrong RN--[phone]   JESSICA Marin, RN--[phone]   JESSICA Cerda, RN, CCRN [phone]   VAD Office Fax: [phone]  Sincerely,      Zulma Floyd M.D.  Medical Director, Mechanical Circulatory Device Support Program  Section of Cardiomyopathy & Heart Transplantation

## 2020-01-15 NOTE — LETTER
[address]  [facility] [address]  Phone: [phone]  Fax: [phone]                      02/27/2020  [employer]  [address]  [place] [address]    To Whom It May Concern:     This letter is to inform you that one of our patients will be discharged into your community.  We want to let you know because this patient has special healthcare needs.    The patient has a Heartmate 3 Left Ventricular Assist Device (LVAD) or blood pump.  The device takes over the pumping function of the patients sick or weakened heart so that the patients lungs, organs, and tissues get the oxygen-rich blood they need.  The LVAD is a life-sustaining device.    Although batteries can power the device short-term, its primary power source is AC power from an electrical outlet.  Therefore, we are requesting that the patient be put on a priority power restoration list in the event of an electrical power outage.  The contact information for this patient appears below:    Account Number: [id number]  Name on the Account: Henry Lott    Patient: Saba Lott  54 y.o. 1965  [address] [facility] [address]       For additional information, visit [email or web address] for further VAD education materials.         If you have any questions about the device or its reliance on AC electricity, lease contact one of the VAD coordinators.  Umm Parish, RN, CCRN     [phone]   Cindy Armstrong, RN--[phone]   FRIEDA MarinN, RN--[phone]   JESSICA Cerda, RN, CCRN [phone]   VAD Office Fax: [phone]  Sincerely,      Zulma Floyd M.D.  Medical Director, Mechanical Circulatory Device Support Program  Section of Cardiomyopathy & Heart Transplantation

## 2020-01-16 ENCOUNTER — TELEPHONE (OUTPATIENT)
Dept: TRANSPLANT | Facility: CLINIC | Age: 55
End: 2020-01-16

## 2020-01-16 LAB
ALBUMIN SERPL BCP-MCNC: 2.9 G/DL (ref 3.5–5.2)
ALBUMIN SERPL BCP-MCNC: 2.9 G/DL (ref 3.5–5.2)
ALLENS TEST: ABNORMAL
ALP SERPL-CCNC: 121 U/L (ref 55–135)
ALP SERPL-CCNC: 121 U/L (ref 55–135)
ALT SERPL W/O P-5'-P-CCNC: 13 U/L (ref 10–44)
ALT SERPL W/O P-5'-P-CCNC: 16 U/L (ref 10–44)
ANION GAP SERPL CALC-SCNC: 10 MMOL/L (ref 8–16)
ANION GAP SERPL CALC-SCNC: 9 MMOL/L (ref 8–16)
APTT BLDCRRT: 36.5 SEC (ref 21–32)
APTT BLDCRRT: 45.4 SEC (ref 21–32)
APTT BLDCRRT: 46.1 SEC (ref 21–32)
ASCENDING AORTA: 3.24 CM
AST SERPL-CCNC: 24 U/L (ref 10–40)
AST SERPL-CCNC: 25 U/L (ref 10–40)
AV INDEX (PROSTH): 1.1
AV MEAN GRADIENT: 2 MMHG
AV PEAK GRADIENT: 3 MMHG
AV VALVE AREA: 3.49 CM2
AV VELOCITY RATIO: 0.99
BASOPHILS # BLD AUTO: 0.05 K/UL (ref 0–0.2)
BASOPHILS NFR BLD: 0.5 % (ref 0–1.9)
BILIRUB SERPL-MCNC: 1.3 MG/DL (ref 0.1–1)
BILIRUB SERPL-MCNC: 1.4 MG/DL (ref 0.1–1)
BNP SERPL-MCNC: 2534 PG/ML (ref 0–99)
BSA FOR ECHO PROCEDURE: 2.32 M2
BUN SERPL-MCNC: 29 MG/DL (ref 6–20)
BUN SERPL-MCNC: 33 MG/DL (ref 6–20)
CALCIUM SERPL-MCNC: 8.3 MG/DL (ref 8.7–10.5)
CALCIUM SERPL-MCNC: 8.8 MG/DL (ref 8.7–10.5)
CHLORIDE SERPL-SCNC: 100 MMOL/L (ref 95–110)
CHLORIDE SERPL-SCNC: 100 MMOL/L (ref 95–110)
CHOLEST SERPL-MCNC: 84 MG/DL (ref 120–199)
CHOLEST/HDLC SERPL: 2.2 {RATIO} (ref 2–5)
CO2 SERPL-SCNC: 24 MMOL/L (ref 23–29)
CO2 SERPL-SCNC: 27 MMOL/L (ref 23–29)
CREAT SERPL-MCNC: 2.6 MG/DL (ref 0.5–1.4)
CREAT SERPL-MCNC: 3 MG/DL (ref 0.5–1.4)
CV ECHO LV RWT: 0.2 CM
DIFFERENTIAL METHOD: ABNORMAL
DOP CALC AO PEAK VEL: 0.81 M/S
DOP CALC AO VTI: 10.03 CM
DOP CALC LVOT AREA: 3.2 CM2
DOP CALC LVOT DIAMETER: 2.01 CM
DOP CALC LVOT PEAK VEL: 0.8 M/S
DOP CALC LVOT STROKE VOLUME: 34.98 CM3
DOP CALCLVOT PEAK VEL VTI: 11.03 CM
E/E' RATIO: 10.78 M/S
ECHO LV POSTERIOR WALL: 0.69 CM (ref 0.6–1.1)
EOSINOPHIL # BLD AUTO: 0.2 K/UL (ref 0–0.5)
EOSINOPHIL NFR BLD: 1.7 % (ref 0–8)
ERYTHROCYTE [DISTWIDTH] IN BLOOD BY AUTOMATED COUNT: 17.1 % (ref 11.5–14.5)
EST. GFR  (AFRICAN AMERICAN): 25.8 ML/MIN/1.73 M^2
EST. GFR  (AFRICAN AMERICAN): 30.7 ML/MIN/1.73 M^2
EST. GFR  (NON AFRICAN AMERICAN): 22.3 ML/MIN/1.73 M^2
EST. GFR  (NON AFRICAN AMERICAN): 26.6 ML/MIN/1.73 M^2
ESTIMATED AVG GLUCOSE: 143 MG/DL (ref 68–131)
FRACTIONAL SHORTENING: 11 % (ref 28–44)
GLUCOSE SERPL-MCNC: 105 MG/DL (ref 70–110)
GLUCOSE SERPL-MCNC: 79 MG/DL (ref 70–110)
HBA1C MFR BLD HPLC: 6.6 % (ref 4–5.6)
HCO3 UR-SCNC: 27 MMOL/L (ref 24–28)
HCT VFR BLD AUTO: 35.5 % (ref 40–54)
HDLC SERPL-MCNC: 38 MG/DL (ref 40–75)
HDLC SERPL: 45.2 % (ref 20–50)
HGB BLD-MCNC: 11.6 G/DL (ref 14–18)
IMM GRANULOCYTES # BLD AUTO: 0.03 K/UL (ref 0–0.04)
IMM GRANULOCYTES NFR BLD AUTO: 0.3 % (ref 0–0.5)
INTERVENTRICULAR SEPTUM: 0.6 CM (ref 0.6–1.1)
LA MAJOR: 6 CM
LA MINOR: 6.34 CM
LA WIDTH: 3.76 CM
LDLC SERPL CALC-MCNC: 34.4 MG/DL (ref 63–159)
LEFT ATRIUM SIZE: 4.16 CM
LEFT ATRIUM VOLUME INDEX: 36.8 ML/M2
LEFT ATRIUM VOLUME: 81.97 CM3
LEFT INTERNAL DIMENSION IN SYSTOLE: 6.14 CM (ref 2.1–4)
LEFT VENTRICLE DIASTOLIC VOLUME INDEX: 111.73 ML/M2
LEFT VENTRICLE DIASTOLIC VOLUME: 248.95 ML
LEFT VENTRICLE MASS INDEX: 83 G/M2
LEFT VENTRICLE SYSTOLIC VOLUME INDEX: 85.2 ML/M2
LEFT VENTRICLE SYSTOLIC VOLUME: 189.81 ML
LEFT VENTRICULAR INTERNAL DIMENSION IN DIASTOLE: 6.92 CM (ref 3.5–6)
LEFT VENTRICULAR MASS: 185.32 G
LV LATERAL E/E' RATIO: 8.08 M/S
LV SEPTAL E/E' RATIO: 16.17 M/S
LYMPHOCYTES # BLD AUTO: 1.5 K/UL (ref 1–4.8)
LYMPHOCYTES NFR BLD: 15.3 % (ref 18–48)
MAGNESIUM SERPL-MCNC: 1.6 MG/DL (ref 1.6–2.6)
MCH RBC QN AUTO: 27.6 PG (ref 27–31)
MCHC RBC AUTO-ENTMCNC: 32.7 G/DL (ref 32–36)
MCV RBC AUTO: 84 FL (ref 82–98)
METHEMOGLOBIN: 0.3 % (ref 0–3)
MONOCYTES # BLD AUTO: 0.9 K/UL (ref 0.3–1)
MONOCYTES NFR BLD: 9.3 % (ref 4–15)
MV PEAK E VEL: 0.97 M/S
NEUTROPHILS # BLD AUTO: 6.9 K/UL (ref 1.8–7.7)
NEUTROPHILS NFR BLD: 72.9 % (ref 38–73)
NONHDLC SERPL-MCNC: 46 MG/DL
NRBC BLD-RTO: 0 /100 WBC
PCO2 BLDA: 43.2 MMHG (ref 35–45)
PH SMN: 7.4 [PH] (ref 7.35–7.45)
PHOSPHATE SERPL-MCNC: 4.9 MG/DL (ref 2.7–4.5)
PISA TR MAX VEL: 2.47 M/S
PLATELET # BLD AUTO: 200 K/UL (ref 150–350)
PMV BLD AUTO: 10.2 FL (ref 9.2–12.9)
PO2 BLDA: 30 MMHG (ref 40–60)
POC BE: 2 MMOL/L
POC SATURATED O2: 57 % (ref 95–100)
POC TCO2: 28 MMOL/L (ref 24–29)
POTASSIUM SERPL-SCNC: 3.9 MMOL/L (ref 3.5–5.1)
POTASSIUM SERPL-SCNC: 4 MMOL/L (ref 3.5–5.1)
PROT SERPL-MCNC: 6.3 G/DL (ref 6–8.4)
PROT SERPL-MCNC: 6.4 G/DL (ref 6–8.4)
RA MAJOR: 5.26 CM
RA PRESSURE: 15 MMHG
RA WIDTH: 4.89 CM
RBC # BLD AUTO: 4.21 M/UL (ref 4.6–6.2)
RIGHT VENTRICULAR END-DIASTOLIC DIMENSION: 4.27 CM
SAMPLE: ABNORMAL
SINUS: 3.5 CM
SITE: ABNORMAL
SODIUM SERPL-SCNC: 134 MMOL/L (ref 136–145)
SODIUM SERPL-SCNC: 136 MMOL/L (ref 136–145)
STJ: 2.98 CM
TDI LATERAL: 0.12 M/S
TDI SEPTAL: 0.06 M/S
TDI: 0.09 M/S
TR MAX PG: 24 MMHG
TRICUSPID ANNULAR PLANE SYSTOLIC EXCURSION: 1.37 CM
TRIGL SERPL-MCNC: 58 MG/DL (ref 30–150)
TV REST PULMONARY ARTERY PRESSURE: 39 MMHG
WBC # BLD AUTO: 9.47 K/UL (ref 3.9–12.7)

## 2020-01-16 PROCEDURE — 83036 HEMOGLOBIN GLYCOSYLATED A1C: CPT

## 2020-01-16 PROCEDURE — 27000202 HC IABP, ADD'L DAY

## 2020-01-16 PROCEDURE — 94761 N-INVAS EAR/PLS OXIMETRY MLT: CPT

## 2020-01-16 PROCEDURE — 80053 COMPREHEN METABOLIC PANEL: CPT | Mod: 91

## 2020-01-16 PROCEDURE — 80053 COMPREHEN METABOLIC PANEL: CPT

## 2020-01-16 PROCEDURE — 85730 THROMBOPLASTIN TIME PARTIAL: CPT | Mod: 91

## 2020-01-16 PROCEDURE — 85730 THROMBOPLASTIN TIME PARTIAL: CPT

## 2020-01-16 PROCEDURE — 51702 INSERT TEMP BLADDER CATH: CPT

## 2020-01-16 PROCEDURE — 99900035 HC TECH TIME PER 15 MIN (STAT)

## 2020-01-16 PROCEDURE — 63600175 PHARM REV CODE 636 W HCPCS: Performed by: PHYSICIAN ASSISTANT

## 2020-01-16 PROCEDURE — 25000003 PHARM REV CODE 250: Performed by: PHYSICIAN ASSISTANT

## 2020-01-16 PROCEDURE — 20000000 HC ICU ROOM

## 2020-01-16 PROCEDURE — 83880 ASSAY OF NATRIURETIC PEPTIDE: CPT

## 2020-01-16 PROCEDURE — 25000003 PHARM REV CODE 250: Performed by: STUDENT IN AN ORGANIZED HEALTH CARE EDUCATION/TRAINING PROGRAM

## 2020-01-16 PROCEDURE — 63600175 PHARM REV CODE 636 W HCPCS: Performed by: STUDENT IN AN ORGANIZED HEALTH CARE EDUCATION/TRAINING PROGRAM

## 2020-01-16 PROCEDURE — 83735 ASSAY OF MAGNESIUM: CPT

## 2020-01-16 PROCEDURE — 84100 ASSAY OF PHOSPHORUS: CPT

## 2020-01-16 PROCEDURE — 27000221 HC OXYGEN, UP TO 24 HOURS

## 2020-01-16 PROCEDURE — 85025 COMPLETE CBC W/AUTO DIFF WBC: CPT

## 2020-01-16 PROCEDURE — 80061 LIPID PANEL: CPT

## 2020-01-16 PROCEDURE — 63600367 HC NITRIC OXIDE PER HOUR

## 2020-01-16 PROCEDURE — 83050 HGB METHEMOGLOBIN QUAN: CPT

## 2020-01-16 RX ORDER — TRIAMCINOLONE ACETONIDE 5 MG/G
CREAM TOPICAL 3 TIMES DAILY
Status: DISCONTINUED | OUTPATIENT
Start: 2020-01-16 | End: 2020-02-06

## 2020-01-16 RX ORDER — POTASSIUM CHLORIDE 20 MEQ/1
20 TABLET, EXTENDED RELEASE ORAL ONCE
Status: COMPLETED | OUTPATIENT
Start: 2020-01-16 | End: 2020-01-16

## 2020-01-16 RX ORDER — MAGNESIUM SULFATE HEPTAHYDRATE 40 MG/ML
2 INJECTION, SOLUTION INTRAVENOUS ONCE
Status: COMPLETED | OUTPATIENT
Start: 2020-01-16 | End: 2020-01-16

## 2020-01-16 RX ORDER — DIPHENHYDRAMINE HCL 25 MG
25 CAPSULE ORAL ONCE
Status: COMPLETED | OUTPATIENT
Start: 2020-01-16 | End: 2020-01-16

## 2020-01-16 RX ORDER — FUROSEMIDE 10 MG/ML
80 INJECTION INTRAMUSCULAR; INTRAVENOUS ONCE
Status: COMPLETED | OUTPATIENT
Start: 2020-01-16 | End: 2020-01-16

## 2020-01-16 RX ADMIN — ALBUTEROL SULFATE 2 PUFF: 90 AEROSOL, METERED RESPIRATORY (INHALATION) at 05:01

## 2020-01-16 RX ADMIN — DOBUTAMINE IN DEXTROSE 5 MCG/KG/MIN: 200 INJECTION, SOLUTION INTRAVENOUS at 05:01

## 2020-01-16 RX ADMIN — ATORVASTATIN CALCIUM 40 MG: 20 TABLET, FILM COATED ORAL at 08:01

## 2020-01-16 RX ADMIN — DOBUTAMINE IN DEXTROSE 5 MCG/KG/MIN: 200 INJECTION, SOLUTION INTRAVENOUS at 01:01

## 2020-01-16 RX ADMIN — POTASSIUM CHLORIDE 20 MEQ: 1500 TABLET, EXTENDED RELEASE ORAL at 08:01

## 2020-01-16 RX ADMIN — HEPARIN SODIUM AND DEXTROSE 14 UNITS/KG/HR: 10000; 5 INJECTION INTRAVENOUS at 03:01

## 2020-01-16 RX ADMIN — HEPARIN SODIUM AND DEXTROSE 14 UNITS/KG/HR: 10000; 5 INJECTION INTRAVENOUS at 12:01

## 2020-01-16 RX ADMIN — FUROSEMIDE 20 MG/HR: 10 INJECTION, SOLUTION INTRAMUSCULAR; INTRAVENOUS at 08:01

## 2020-01-16 RX ADMIN — MORPHINE SULFATE 4 MG: 2 INJECTION, SOLUTION INTRAMUSCULAR; INTRAVENOUS at 02:01

## 2020-01-16 RX ADMIN — TRIAMCINOLONE ACETONIDE: 5 CREAM TOPICAL at 03:01

## 2020-01-16 RX ADMIN — MORPHINE SULFATE 4 MG: 2 INJECTION, SOLUTION INTRAMUSCULAR; INTRAVENOUS at 10:01

## 2020-01-16 RX ADMIN — OXYCODONE HYDROCHLORIDE 5 MG: 5 TABLET ORAL at 01:01

## 2020-01-16 RX ADMIN — MAGNESIUM SULFATE IN WATER 2 G: 40 INJECTION, SOLUTION INTRAVENOUS at 06:01

## 2020-01-16 RX ADMIN — DIPHENHYDRAMINE HYDROCHLORIDE 25 MG: 25 CAPSULE ORAL at 10:01

## 2020-01-16 RX ADMIN — PANTOPRAZOLE SODIUM 40 MG: 40 TABLET, DELAYED RELEASE ORAL at 08:01

## 2020-01-16 RX ADMIN — FUROSEMIDE 30 MG/HR: 10 INJECTION, SOLUTION INTRAMUSCULAR; INTRAVENOUS at 11:01

## 2020-01-16 RX ADMIN — FUROSEMIDE 80 MG: 10 INJECTION, SOLUTION INTRAMUSCULAR; INTRAVENOUS at 11:01

## 2020-01-16 RX ADMIN — FUROSEMIDE 30 MG/HR: 10 INJECTION, SOLUTION INTRAMUSCULAR; INTRAVENOUS at 02:01

## 2020-01-16 RX ADMIN — TRIAMCINOLONE ACETONIDE: 5 CREAM TOPICAL at 09:01

## 2020-01-16 NOTE — NURSING
Pt arrived to [facility] 6074 from [facility]. IABP to R groin, Auto 1:1 EKG, Aug 100's. Site WDL, pt c/o pain at site.  and lasix gtts infusing. HTS notified of pt arrival. ANNY, BRADEN.
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
## 2020-01-16 NOTE — ASSESSMENT & PLAN NOTE
Unsure of timing but was on eliquis PTA.     - Place on heparin gtt for now  - get further information regarding hx

## 2020-01-16 NOTE — ASSESSMENT & PLAN NOTE
-NICM; Likely Etoh induced  -Last 2D Echo 1/16/20: LVEF 20%, LVEDD 6.92 cm   -Transferred from [facility] with IABP at 1:1 for further level of care  -Continued IABP and ; increased Lasix infusion to 30mg/hr  -Hypervolemic on examination today  -CVP: 17, SVO2: 64, CO: 7.07, CI: 3.05, SVR: 837.  Will continue lasix infusion at current dose and consider diuril if UOP drops.  Continue IABP and .   -GDMT: Will start low dose Isordil and Hydralazine today.  Patient was on Entresto prior to admission  -Patient is not currently being evaluated for OHTx/VAD; currenlty too ill given ANJELICA. Will likely need workup soon if renal recovery.   -2g Na dietary restriction, 1500 mL fluid restriction, strict I/Os

## 2020-01-16 NOTE — NURSING
Team aware of R IJ  TLC and R IJ Trialysis in place since 1/8. Team made aware that since the two lines are so close, it would be dangerous to pull one without pulling the other.   Team stated to leave both in for now.

## 2020-01-16 NOTE — ASSESSMENT & PLAN NOTE
Transferred to [facility] for higher level care. IABP in place at 1:1    CVP 27   SVO 2 59  MAP 81  CO 5.9  CI 2.6  SVR  724.5    Plan:  - continue  at 5   - rebolus and increase lasix to 20/hr and start on NO at 10ppm for some RV relief   - hold BB, entresto for now resume when more stable  - fluid restriction, daily weight, cardiac diet
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
## 2020-01-16 NOTE — HPI
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 Chronic MARC - Class II-III and mild LE edema transferred from [facility] for further management of cardigogenic shock and possible advanced options.

## 2020-01-16 NOTE — TELEPHONE ENCOUNTER
Per order of Dr Daniel, new referral to preht captured.  Dr Daniel is aware of pt's prior compliance issues --no show to clinic and failure to return calls.     Evaluation Pathway has not yet be initiated.   Will await further order.   notified.
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
## 2020-01-16 NOTE — PLAN OF CARE
CMICU DAILY GOALS       A: Awake    RASS: Goal - RASS Goal: 0-->alert and calm  Actual - RASS (Mcmillan Agitation-Sedation Scale): 0-->alert and calm   Restraint necessity:    B: Breath   SBT: NA   C: Coordinate A & B, analgesics/sedatives   Pain: managed    SAT: NA  D: Delirium   CAM-ICU: Overall CAM-ICU: Negative  E: Early Mobility   MOVE Screen: Fail   Activity: Activity Management: bedrest maintained per order  FAS: Feeding/Nutrition   Diet order: Diet/Nutrition Received: low saturated fat/low cholesterol, 2 gram sodium, restrict fluids,   Fluid restriction: Fluid Requirement: 1500 cc FR  T: Thrombus   DVT prophylaxis: VTE Required Core Measure: Pharmacological prophylaxis initiated/maintained  H: HOB Elevation   Head of Bed (HOB): HOB flat  U: Ulcer Prophylaxis   GI: no  G: Glucose control   managed    S: Skin   Bundle compliance: yes   Bathing/Skin Care: bath, chlorhexidine, dressed/undressed, linen changed, bath, partial, back care Date: 1/15/2020  B: Bowel Function   no issues   I: Indwelling Catheters   Mc necessity:      Urethral Catheter 01/15/20 2030 Straight-tip 16 Fr.-Reason for Continuing Urinary Catheterization: Critically ill in ICU requiring intensive monitoring   CVC necessity: Yes   IPAD offered: No  D: De-escalation Antibx   No  Plan for the day   Monitor CVP/SVO2, continue diuresis  Family/Goals of care/Code Status   Code Status: Full Code     No acute events throughout day, VS and assessment per flow sheet, patient progressing towards goals as tolerated, plan of care reviewed with Saba Lott and family, all concerns addressed, will continue to monitor.

## 2020-01-16 NOTE — ASSESSMENT & PLAN NOTE
Down to 3 from 4.3 at OSH. Admitted at 1.4 per records and with normal function ~ 8 mos ago. Cardiorenal etiology most likely    Plan:  - Currently with decent uop   - no need for CRRT at this time  - continue to closely monitor   - avoid nephrotoxic agents

## 2020-01-17 PROBLEM — L29.9 PRURITUS: Status: ACTIVE | Noted: 2020-01-17

## 2020-01-17 LAB
ALBUMIN SERPL BCP-MCNC: 2.9 G/DL (ref 3.5–5.2)
ALBUMIN SERPL BCP-MCNC: 3.1 G/DL (ref 3.5–5.2)
ALLENS TEST: ABNORMAL
ALP SERPL-CCNC: 121 U/L (ref 55–135)
ALP SERPL-CCNC: 126 U/L (ref 55–135)
ALT SERPL W/O P-5'-P-CCNC: 14 U/L (ref 10–44)
ALT SERPL W/O P-5'-P-CCNC: 15 U/L (ref 10–44)
ANION GAP SERPL CALC-SCNC: 12 MMOL/L (ref 8–16)
ANION GAP SERPL CALC-SCNC: 8 MMOL/L (ref 8–16)
ANION GAP SERPL CALC-SCNC: 8 MMOL/L (ref 8–16)
APTT BLDCRRT: 35.2 SEC (ref 21–32)
APTT BLDCRRT: 40.4 SEC (ref 21–32)
APTT BLDCRRT: 49 SEC (ref 21–32)
AST SERPL-CCNC: 26 U/L (ref 10–40)
AST SERPL-CCNC: 27 U/L (ref 10–40)
BASOPHILS # BLD AUTO: 0.03 K/UL (ref 0–0.2)
BASOPHILS NFR BLD: 0.3 % (ref 0–1.9)
BILIRUB SERPL-MCNC: 1.4 MG/DL (ref 0.1–1)
BILIRUB SERPL-MCNC: 1.5 MG/DL (ref 0.1–1)
BNP SERPL-MCNC: 1851 PG/ML (ref 0–99)
BUN SERPL-MCNC: 25 MG/DL (ref 6–20)
BUN SERPL-MCNC: 25 MG/DL (ref 6–20)
BUN SERPL-MCNC: 27 MG/DL (ref 6–20)
CALCIUM SERPL-MCNC: 8.5 MG/DL (ref 8.7–10.5)
CALCIUM SERPL-MCNC: 8.6 MG/DL (ref 8.7–10.5)
CALCIUM SERPL-MCNC: 8.6 MG/DL (ref 8.7–10.5)
CHLORIDE SERPL-SCNC: 99 MMOL/L (ref 95–110)
CO2 SERPL-SCNC: 27 MMOL/L (ref 23–29)
CO2 SERPL-SCNC: 31 MMOL/L (ref 23–29)
CO2 SERPL-SCNC: 31 MMOL/L (ref 23–29)
CREAT SERPL-MCNC: 2.2 MG/DL (ref 0.5–1.4)
DELSYS: ABNORMAL
DIFFERENTIAL METHOD: ABNORMAL
EOSINOPHIL # BLD AUTO: 0.3 K/UL (ref 0–0.5)
EOSINOPHIL NFR BLD: 2.7 % (ref 0–8)
ERYTHROCYTE [DISTWIDTH] IN BLOOD BY AUTOMATED COUNT: 17.2 % (ref 11.5–14.5)
ERYTHROCYTE [SEDIMENTATION RATE] IN BLOOD BY WESTERGREN METHOD: 18 MM/H
EST. GFR  (AFRICAN AMERICAN): 37.6 ML/MIN/1.73 M^2
EST. GFR  (NON AFRICAN AMERICAN): 32.5 ML/MIN/1.73 M^2
FIO2: 36
FLOW: 4
GLUCOSE SERPL-MCNC: 76 MG/DL (ref 70–110)
GLUCOSE SERPL-MCNC: 94 MG/DL (ref 70–110)
GLUCOSE SERPL-MCNC: 94 MG/DL (ref 70–110)
HCO3 UR-SCNC: 30 MMOL/L (ref 24–28)
HCT VFR BLD AUTO: 36.8 % (ref 40–54)
HGB BLD-MCNC: 11.7 G/DL (ref 14–18)
IMM GRANULOCYTES # BLD AUTO: 0.03 K/UL (ref 0–0.04)
IMM GRANULOCYTES NFR BLD AUTO: 0.3 % (ref 0–0.5)
LYMPHOCYTES # BLD AUTO: 1.3 K/UL (ref 1–4.8)
LYMPHOCYTES NFR BLD: 14.5 % (ref 18–48)
MAGNESIUM SERPL-MCNC: 1.7 MG/DL (ref 1.6–2.6)
MAGNESIUM SERPL-MCNC: 1.9 MG/DL (ref 1.6–2.6)
MCH RBC QN AUTO: 27.3 PG (ref 27–31)
MCHC RBC AUTO-ENTMCNC: 31.8 G/DL (ref 32–36)
MCV RBC AUTO: 86 FL (ref 82–98)
METHEMOGLOBIN: 0.4 % (ref 0–3)
MODE: ABNORMAL
MONOCYTES # BLD AUTO: 0.7 K/UL (ref 0.3–1)
MONOCYTES NFR BLD: 7.7 % (ref 4–15)
NEUTROPHILS # BLD AUTO: 6.8 K/UL (ref 1.8–7.7)
NEUTROPHILS NFR BLD: 74.5 % (ref 38–73)
NRBC BLD-RTO: 0 /100 WBC
PCO2 BLDA: 51 MMHG (ref 35–45)
PH SMN: 7.38 [PH] (ref 7.35–7.45)
PHOSPHATE SERPL-MCNC: 4.5 MG/DL (ref 2.7–4.5)
PLATELET # BLD AUTO: 176 K/UL (ref 150–350)
PMV BLD AUTO: 10.4 FL (ref 9.2–12.9)
PO2 BLDA: 34 MMHG (ref 40–60)
POC BE: 5 MMOL/L
POC SATURATED O2: 64 % (ref 95–100)
POC TCO2: 32 MMOL/L (ref 24–29)
POTASSIUM SERPL-SCNC: 3.5 MMOL/L (ref 3.5–5.1)
POTASSIUM SERPL-SCNC: 4.1 MMOL/L (ref 3.5–5.1)
POTASSIUM SERPL-SCNC: 4.1 MMOL/L (ref 3.5–5.1)
PROT SERPL-MCNC: 6.3 G/DL (ref 6–8.4)
PROT SERPL-MCNC: 6.6 G/DL (ref 6–8.4)
RBC # BLD AUTO: 4.29 M/UL (ref 4.6–6.2)
SAMPLE: ABNORMAL
SITE: ABNORMAL
SODIUM SERPL-SCNC: 138 MMOL/L (ref 136–145)
WBC # BLD AUTO: 9.15 K/UL (ref 3.9–12.7)

## 2020-01-17 PROCEDURE — 83050 HGB METHEMOGLOBIN QUAN: CPT

## 2020-01-17 PROCEDURE — 27000202 HC IABP, ADD'L DAY

## 2020-01-17 PROCEDURE — 99292 CRITICAL CARE ADDL 30 MIN: CPT | Mod: ,,, | Performed by: INTERNAL MEDICINE

## 2020-01-17 PROCEDURE — 63600175 PHARM REV CODE 636 W HCPCS: Performed by: STUDENT IN AN ORGANIZED HEALTH CARE EDUCATION/TRAINING PROGRAM

## 2020-01-17 PROCEDURE — 83735 ASSAY OF MAGNESIUM: CPT

## 2020-01-17 PROCEDURE — 25000003 PHARM REV CODE 250: Performed by: STUDENT IN AN ORGANIZED HEALTH CARE EDUCATION/TRAINING PROGRAM

## 2020-01-17 PROCEDURE — 83735 ASSAY OF MAGNESIUM: CPT | Mod: 91

## 2020-01-17 PROCEDURE — 83880 ASSAY OF NATRIURETIC PEPTIDE: CPT

## 2020-01-17 PROCEDURE — 25000003 PHARM REV CODE 250: Performed by: INTERNAL MEDICINE

## 2020-01-17 PROCEDURE — 63600367 HC NITRIC OXIDE PER HOUR

## 2020-01-17 PROCEDURE — 20000000 HC ICU ROOM

## 2020-01-17 PROCEDURE — 94761 N-INVAS EAR/PLS OXIMETRY MLT: CPT

## 2020-01-17 PROCEDURE — 85730 THROMBOPLASTIN TIME PARTIAL: CPT | Mod: 91

## 2020-01-17 PROCEDURE — 85730 THROMBOPLASTIN TIME PARTIAL: CPT

## 2020-01-17 PROCEDURE — 99291 CRITICAL CARE FIRST HOUR: CPT | Mod: ,,, | Performed by: PHYSICIAN ASSISTANT

## 2020-01-17 PROCEDURE — 99291 PR CRITICAL CARE, E/M 30-74 MINUTES: ICD-10-PCS | Mod: ,,, | Performed by: PHYSICIAN ASSISTANT

## 2020-01-17 PROCEDURE — 99900035 HC TECH TIME PER 15 MIN (STAT)

## 2020-01-17 PROCEDURE — 85025 COMPLETE CBC W/AUTO DIFF WBC: CPT

## 2020-01-17 PROCEDURE — 25000003 PHARM REV CODE 250: Performed by: PHYSICIAN ASSISTANT

## 2020-01-17 PROCEDURE — 80053 COMPREHEN METABOLIC PANEL: CPT

## 2020-01-17 PROCEDURE — 84100 ASSAY OF PHOSPHORUS: CPT

## 2020-01-17 PROCEDURE — 82803 BLOOD GASES ANY COMBINATION: CPT

## 2020-01-17 PROCEDURE — 27000221 HC OXYGEN, UP TO 24 HOURS

## 2020-01-17 PROCEDURE — 99292 PR CRITICAL CARE, ADDL 30 MIN: ICD-10-PCS | Mod: ,,, | Performed by: INTERNAL MEDICINE

## 2020-01-17 PROCEDURE — 63600175 PHARM REV CODE 636 W HCPCS: Performed by: PHYSICIAN ASSISTANT

## 2020-01-17 RX ORDER — POTASSIUM CHLORIDE 20 MEQ/1
40 TABLET, EXTENDED RELEASE ORAL ONCE
Status: COMPLETED | OUTPATIENT
Start: 2020-01-17 | End: 2020-01-17

## 2020-01-17 RX ORDER — ISOSORBIDE DINITRATE 10 MG/1
10 TABLET ORAL 2 TIMES DAILY
Status: DISCONTINUED | OUTPATIENT
Start: 2020-01-17 | End: 2020-01-18

## 2020-01-17 RX ORDER — HYDRALAZINE HYDROCHLORIDE 10 MG/1
10 TABLET, FILM COATED ORAL EVERY 12 HOURS
Status: DISCONTINUED | OUTPATIENT
Start: 2020-01-17 | End: 2020-01-18

## 2020-01-17 RX ORDER — HYDROXYZINE HYDROCHLORIDE 25 MG/1
25 TABLET, FILM COATED ORAL 3 TIMES DAILY PRN
Status: DISCONTINUED | OUTPATIENT
Start: 2020-01-17 | End: 2020-02-06

## 2020-01-17 RX ORDER — TRAMADOL HYDROCHLORIDE 50 MG/1
50 TABLET ORAL EVERY 6 HOURS PRN
Status: DISCONTINUED | OUTPATIENT
Start: 2020-01-17 | End: 2020-02-06

## 2020-01-17 RX ORDER — FAMOTIDINE 20 MG/1
20 TABLET, FILM COATED ORAL DAILY
Status: DISCONTINUED | OUTPATIENT
Start: 2020-01-17 | End: 2020-01-21

## 2020-01-17 RX ORDER — CETIRIZINE HYDROCHLORIDE 5 MG/1
10 TABLET ORAL DAILY
Status: DISCONTINUED | OUTPATIENT
Start: 2020-01-17 | End: 2020-02-06

## 2020-01-17 RX ORDER — MAGNESIUM SULFATE HEPTAHYDRATE 40 MG/ML
2 INJECTION, SOLUTION INTRAVENOUS ONCE
Status: COMPLETED | OUTPATIENT
Start: 2020-01-17 | End: 2020-01-17

## 2020-01-17 RX ORDER — TALC
6 POWDER (GRAM) TOPICAL NIGHTLY
Status: DISCONTINUED | OUTPATIENT
Start: 2020-01-17 | End: 2020-02-06

## 2020-01-17 RX ORDER — ACETAMINOPHEN 500 MG
1000 TABLET ORAL EVERY 8 HOURS
Status: DISCONTINUED | OUTPATIENT
Start: 2020-01-17 | End: 2020-02-04

## 2020-01-17 RX ADMIN — ISOSORBIDE DINITRATE 10 MG: 10 TABLET ORAL at 10:01

## 2020-01-17 RX ADMIN — HYDRALAZINE HYDROCHLORIDE 10 MG: 10 TABLET, FILM COATED ORAL at 10:01

## 2020-01-17 RX ADMIN — FUROSEMIDE 30 MG/HR: 10 INJECTION, SOLUTION INTRAMUSCULAR; INTRAVENOUS at 03:01

## 2020-01-17 RX ADMIN — ACETAMINOPHEN 1000 MG: 500 TABLET ORAL at 09:01

## 2020-01-17 RX ADMIN — PANTOPRAZOLE SODIUM 40 MG: 40 TABLET, DELAYED RELEASE ORAL at 08:01

## 2020-01-17 RX ADMIN — POLYETHYLENE GLYCOL 3350 17 G: 17 POWDER, FOR SOLUTION ORAL at 08:01

## 2020-01-17 RX ADMIN — Medication 6 MG: at 09:01

## 2020-01-17 RX ADMIN — TRIAMCINOLONE ACETONIDE: 5 CREAM TOPICAL at 03:01

## 2020-01-17 RX ADMIN — DOBUTAMINE IN DEXTROSE 5 MCG/KG/MIN: 200 INJECTION, SOLUTION INTRAVENOUS at 08:01

## 2020-01-17 RX ADMIN — FUROSEMIDE 30 MG/HR: 10 INJECTION, SOLUTION INTRAMUSCULAR; INTRAVENOUS at 05:01

## 2020-01-17 RX ADMIN — HYDROXYZINE HYDROCHLORIDE 25 MG: 25 TABLET, FILM COATED ORAL at 09:01

## 2020-01-17 RX ADMIN — ISOSORBIDE DINITRATE 10 MG: 10 TABLET ORAL at 09:01

## 2020-01-17 RX ADMIN — HYDRALAZINE HYDROCHLORIDE 10 MG: 10 TABLET, FILM COATED ORAL at 09:01

## 2020-01-17 RX ADMIN — TRIAMCINOLONE ACETONIDE: 5 CREAM TOPICAL at 09:01

## 2020-01-17 RX ADMIN — OXYCODONE HYDROCHLORIDE 5 MG: 5 TABLET ORAL at 07:01

## 2020-01-17 RX ADMIN — DOBUTAMINE IN DEXTROSE 5 MCG/KG/MIN: 200 INJECTION, SOLUTION INTRAVENOUS at 11:01

## 2020-01-17 RX ADMIN — ACETAMINOPHEN 1000 MG: 500 TABLET ORAL at 10:01

## 2020-01-17 RX ADMIN — FUROSEMIDE 30 MG/HR: 10 INJECTION, SOLUTION INTRAMUSCULAR; INTRAVENOUS at 10:01

## 2020-01-17 RX ADMIN — MORPHINE SULFATE 4 MG: 2 INJECTION, SOLUTION INTRAMUSCULAR; INTRAVENOUS at 04:01

## 2020-01-17 RX ADMIN — ALBUTEROL SULFATE 2 PUFF: 90 AEROSOL, METERED RESPIRATORY (INHALATION) at 09:01

## 2020-01-17 RX ADMIN — MAGNESIUM SULFATE IN WATER 2 G: 40 INJECTION, SOLUTION INTRAVENOUS at 06:01

## 2020-01-17 RX ADMIN — FAMOTIDINE 20 MG: 20 TABLET ORAL at 10:01

## 2020-01-17 RX ADMIN — ATORVASTATIN CALCIUM 40 MG: 20 TABLET, FILM COATED ORAL at 08:01

## 2020-01-17 RX ADMIN — CETIRIZINE HYDROCHLORIDE 10 MG: 10 TABLET, FILM COATED ORAL at 10:01

## 2020-01-17 RX ADMIN — FUROSEMIDE 30 MG/HR: 10 INJECTION, SOLUTION INTRAMUSCULAR; INTRAVENOUS at 11:01

## 2020-01-17 RX ADMIN — POTASSIUM CHLORIDE 40 MEQ: 1500 TABLET, EXTENDED RELEASE ORAL at 06:01

## 2020-01-17 RX ADMIN — HEPARIN SODIUM AND DEXTROSE 16 UNITS/KG/HR: 10000; 5 INJECTION INTRAVENOUS at 08:01

## 2020-01-17 NOTE — PROGRESS NOTES
01/17/2020  Miko Rivera    Current provider:  Lian Daniel MD      I, Miko Rivera, rounded on Saba Lott to ensure all mechanical assist device settings (IABP or VAD) were appropriate and all parameters were within limits.  I was able to ensure all back up equipment was present, the staff had no issues, and the Perfusion Department daily rounding was complete.    8:16 AM

## 2020-01-17 NOTE — PHYSICIAN QUERY
"PT Name: Saba Lott  MR #: [id number]    Physician Query Form - Heart  Condition Clarification     CDS/: Tisha Ferguson RN, CCDS              Contact information: [email or web address]  This form is a permanent document in the medical record.     Query Date: January 17, 2020    By submitting this query, we are merely seeking further clarification of documentation. Please utilize your independent clinical judgment when addressing the question(s) below.    The medical record contains the following   Indicators     Supporting Clinical Findings Location in Medical Record   X BNP 3.525 1/15 lab   X EF 20% 1/16 echo    Radiology findings     X Echo Results · Severely decreased left ventricular systolic function. The estimated ejection fraction is 20%.  · Severe left ventricular enlargement.  · Global hypokinetic wall motion.  · Left ventricular diastolic dysfunction.  · Mild right ventricular enlargement.  · Mildly to moderately reduced right ventricular systolic function.  · Moderate-to-severe mitral regurgitation.  · Moderate tricuspid regurgitation.  · The estimated PA systolic pressure is 39 mmHg.  · Elevated central venous pressure (15 mmHg).  · Mild biatrial enlargement. 1/16 echo    "Ascites" documented     X "SOB" or "MARC" documented Admitted to [facility] on Thursday due to severe SOB  1/15 h/p    "Hypoxia" documented     X Heart Failure documented found to be in acute diastolic heart failure.   1/15 h/p   X "Edema" documented Class II-III and mild LE edema.   1/15 h/p   X Diuretics/Meds  .Continued IABP and ; increased Lasix infusion to 30mg/hr  -Hypervolemic on examination  1/17 prog note    Treatment:     X Other:    s/p ICD implantation for primary prevention on 2/15/19  1/15 h/p   Heart failure (HF) can be acute, chronic or both. It is generally further specificed as systolic, diastolic, or combined. Lastly, it is important to identify an underlying etiology if known or suspected.     Common " clues to acute exacerbation:  Rapidly progressive symptoms (w/in 2 weeks of presentation), using IV diuretics to treat, using supplemental O2, pulmonary edema on Xray, MI w/in 4 weeks, and/or BNP >500    Systolic Heart Failure: is defined as chart documentation of a left ventricular ejection fraction (LVEF) less than 40%     Diastolic Heart Failure: is defined as a left ventricular ejection fraction (LVEF) greater than 40%   +      Evidence of diastolic dysfunction on echocardiography OR    Right heart catheterization wedge pressure above 12 mm Hg OR    Left heart catheterization left ventricular end diastolic pressure 18 mm Hg or above.    References: *American Heart Association    The clinical guidelines noted below are only system guidelines, and do not replace the providers clinical judgment.     Provider, please validate the type and acuity of HF.  [   ] Acute Diastolic Heart Failure - New diagnosis.  EF > 40%  and acute HF symptoms documented   [ x  ] Acute on Chronic Combined Systolic and Diastolic Heart Failure      [   ] Other Type of Heart Failure (please specify type):   [  x ] Other (please specify):cardiogenic shock.   [  ] Clinically Undetermined                           Please document in your progress notes daily for the duration of treatment until resolved and include in your discharge summary.

## 2020-01-17 NOTE — PLAN OF CARE
CMICU DAILY GOALS       A: Awake    RASS: Goal - RASS Goal: 0-->alert and calm  Actual - RASS (Mcmillan Agitation-Sedation Scale): 0-->alert and calm   Restraint necessity:    B: Breath   SBT: Not intubated   C: Coordinate A & B, analgesics/sedatives   Pain: managed percocet and morphine given. Pt states percocet does not work and morphine works.    SAT: Not intubated  D: Delirium   CAM-ICU: Overall CAM-ICU: Negative  E: Early Mobility   MOVE Screen: Fail   Activity: Activity Management: bedrest maintained per order  FAS: Feeding/Nutrition   Diet order: Diet/Nutrition Received: low saturated fat/low cholesterol,   Fluid restriction: Fluid Requirement: 1500 FR  T: Thrombus   DVT prophylaxis: VTE Required Core Measure: Pharmacological prophylaxis initiated/maintained  H: HOB Elevation   Head of Bed (HOB): HOB flat  U: Ulcer Prophylaxis   GI: no  G: Glucose control   N/a     S: Skin   Bundle compliance: yes   Bathing/Skin Care: bath, chlorhexidine, dressed/undressed, linen changed, bath, partial, back care Date: 1/15  B: Bowel Function   no issues   I: Indwelling Catheters   Mc necessity:      Urethral Catheter 01/15/20 2030 Straight-tip 16 Fr.-Reason for Continuing Urinary Catheterization: Critically ill in ICU requiring intensive monitoring   CVC necessity: Yes   IPAD offered: No  D: De-escalation Antibx   Yes  Plan for the day   Continue to diurese  Family/Goals of care/Code Status   Code Status: Full Code     No acute events throughout day, VS and assessment per flow sheet, patient progressing towards goals as tolerated, plan of care reviewed with Saba Lott and family, all concerns addressed, will continue to monitor.

## 2020-01-17 NOTE — PLAN OF CARE
CMICU DAILY GOALS       A: Awake    RASS: Goal - RASS Goal: 0-->alert and calm  Actual - RASS (Mcmillan Agitation-Sedation Scale): 0-->alert and calm   Restraint necessity: Clinical Justification: Removing medical devices  B: Breath   SBT: Not intubated   C: Coordinate A & B, analgesics/sedatives   Pain: managed    SAT: Not intubated  D: Delirium   CAM-ICU: Overall CAM-ICU: Negative  E: Early Mobility   MOVE Screen: Pass   Activity: Activity Management: bedrest maintained per order  FAS: Feeding/Nutrition   Diet order: Diet/Nutrition Received: low saturated fat/low cholesterol,   Fluid restriction: Fluid Requirement: 1500 FR  T: Thrombus   DVT prophylaxis: VTE Required Core Measure: Pharmacological prophylaxis initiated/maintained  H: HOB Elevation   Head of Bed (HOB): HOB flat  U: Ulcer Prophylaxis   GI: yes  G: Glucose control   NA     S: Skin   Bundle compliance: yes   Bathing/Skin Care: bath, chlorhexidine, bath, complete, linen changed, dressed/undressed  B: Bowel Function   no issues   I: Indwelling Catheters   Mc necessity:      Urethral Catheter 01/15/20 2030 Straight-tip 16 Fr.-Reason for Continuing Urinary Catheterization: Critically ill in ICU requiring intensive monitoring   CVC necessity: Yes   IPAD offered: No  D: De-escalation Antibx   No  Plan for the day   Continue medical management. Keep patient on IABP support. For advanced option work up.  Family/Goals of care/Code Status   Code Status: Full Code     No acute events throughout day, VS and assessment per flow sheet,  CVP 20, SVO2 64. Patient progressing towards goals as tolerated, plan of care reviewed with Saba Lott and family, all concerns addressed, will continue to monitor.

## 2020-01-17 NOTE — ASSESSMENT & PLAN NOTE
-Upon admit here; creat was down to 3 from 4.3 at OSH. Admitted at 1.4 per records and with normal function ~ 8 mos ago. Cardiorenal etiology most likely  -Creatinine improving with diuresis.  -Will continue to monitor  - avoid nephrotoxic agents

## 2020-01-17 NOTE — PROGRESS NOTES
[facility]  Heart Transplant  Progress Note    Patient Name: Saba Lott  MRN: [id number]  Admission Date: 1/15/2020  Hospital Length of Stay: 2 days  Attending Physician: Lian Daniel MD  Primary Care Provider: Primary Doctor No  Principal Problem:Cardiogenic shock    Subjective:     Interval History: Patient diuresed well on IV Lasix at 30mg/hr.  He is net negative 4.3L in the last 24 hours.  CVP: 17, SVO2: 64, CO: 7.07, CI: 3.05, SVR: 837.  He complains of itching despite Triamcinolone cream and Benadryl.     Continuous Infusions:   DOBUTamine 5 mcg/kg/min (01/17/20 0900)    furosemide (LASIX) 2 mg/mL infusion (non-titrating) 30 mg/hr (01/17/20 1038)    heparin (porcine) in D5W 16 Units/kg/hr (01/17/20 0900)    nitric oxide gas       Scheduled Meds:   acetaminophen  1,000 mg Oral Q8H    atorvastatin  40 mg Oral Daily    cetirizine  10 mg Oral Daily    famotidine  20 mg Oral Daily    hydrALAZINE  10 mg Oral Q12H    isosorbide dinitrate  10 mg Oral BID    melatonin  6 mg Oral Nightly    polyethylene glycol  17 g Oral Daily    triamcinolone acetonide 0.5%   Topical (Top) TID     PRN Meds:albuterol, bisacodyl, heparin (PORCINE), heparin (PORCINE), hydrOXYzine HCl, sodium chloride 0.9%, traMADol    Review of patient's allergies indicates:   Allergen Reactions    Iodine and iodide containing products      Objective:     Vital Signs (Most Recent):  Temp: 96.9 °F (36.1 °C) (01/17/20 0730)  Pulse: (!) 113 (01/17/20 0926)  Resp: (!) 22 (01/17/20 0926)  BP: 125/87 (01/17/20 0730)  SpO2: 98 % (01/17/20 0900) Vital Signs (24h Range):  Temp:  [96.9 °F (36.1 °C)-97.6 °F (36.4 °C)] 96.9 °F (36.1 °C)  Pulse:  [] 113  Resp:  [16-45] 22  SpO2:  [91 %-100 %] 98 %  BP: (105-136)/(11-92) 125/87     Patient Vitals for the past 72 hrs (Last 3 readings):   Weight   01/16/20 0100 114 kg (251 lb 5.2 oz)   01/15/20 2027 114 kg (251 lb 5.2 oz)     Body mass index is 39.36 kg/m².      Intake/Output  Summary (Last 24 hours) at 1/17/2020 1057  Last data filed at 1/17/2020 0927  Gross per 24 hour   Intake 1890.83 ml   Output 6255 ml   Net -4364.17 ml       Hemodynamic Parameters:       Telemetry: reviewed  Physical Exam  Constitutional: laying in bed NAD;   Neck: Normal range of motion. Neck supple. JVD present  but difficult to access due to body habiuts. Right trialysis and RIJ in place    Cardiovascular: Normal rate, regular rhythm and intact distal pulses. No murmur heard.  Pulmonary/Chest: Effort normal. No respiratory distress.   Abdominal: Soft. He exhibits distension. A hernia is present.   Musculoskeletal: He exhibits edema.   Neurological: He is alert and oriented to person, place, and time.   Skin: Skin is warm. Capillary refill takes 2 to 3 seconds.   Psychiatric: He has a normal mood and affect.   Nursing note and vitals reviewed.    Significant Labs:  CBC:  Recent Labs   Lab 01/15/20  2001 01/16/20  0448 01/17/20  0424   WBC 7.68 9.47 9.15   RBC 4.36* 4.21* 4.29*   HGB 12.0* 11.6* 11.7*   HCT 36.8* 35.5* 36.8*    200 176   MCV 84 84 86   MCH 27.5 27.6 27.3   MCHC 32.6 32.7 31.8*     BNP:  Recent Labs   Lab 01/15/20  2001 01/16/20  1106 01/17/20  0424   BNP 3,525* 2,534* 1,851*     CMP:  Recent Labs   Lab 01/16/20 0448 01/16/20 1812 01/17/20  0424   GLU 79 105 76   CALCIUM 8.8 8.3* 8.5*   ALBUMIN 2.9* 2.9* 3.1*   PROT 6.4 6.3 6.6   * 136 138   K 3.9 4.0 3.5   CO2 24 27 27    100 99   BUN 33* 29* 27*   CREATININE 3.0* 2.6* 2.2*   ALKPHOS 121 121 126   ALT 13 16 15   AST 24 25 26   BILITOT 1.4* 1.3* 1.5*      Coagulation:   Recent Labs   Lab 01/15/20  2001  01/16/20  1812 01/17/20  0017 01/17/20  0424   INR 2.0*  --   --   --   --    APTT 26.1   < > 45.4* 40.4* 35.2*    < > = values in this interval not displayed.     LDH:  No results for input(s): LDH in the last 72 hours.  Microbiology:  Microbiology Results (last 7 days)     ** No results found for the last 168 hours. **           I have reviewed all pertinent labs within the past 24 hours.    Estimated Creatinine Clearance: 45.8 mL/min (A) (based on SCr of 2.2 mg/dL (H)).    Diagnostic Results:  I have reviewed all pertinent imaging results/findings within the past 24 hours.    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 Chronic MARC - Class II-III and mild LE edema.      Hospital Course (synopsis of major diagnoses, care, treatment, and services provided during the course of the hospital stay):  -2/12 admit to CDU for diuresis with IV lasix  -IV abx   -CXR with suspected small left pleural effusion with associated left basilar atelectasis versus evolving airspace disease  -2/13 single chamber ICD implant; IV lasix decreased to BID  -2/16 add dobutamine, hold BB due to hypotension, no ACE-I or ARB due to recent HPI hypotension  -2/17 Lasix changed to PO  -2/18 dobutamine discontinued    Admitted to [facility] on Thursday due to severe SOB for a week with associated orthopnea, MARC, and PND unable to lie flat and found to be in acute diastolic heart failure. States he normally weighs around 219 but up to 254lb on admission tonight. Says he hasn't been the most compliant in terms of fluid restriction and daily weights but has avoided salt. BNP on admission was 2375. BUN/CRT 32/1.4 He was started on IV diuretics but continued to deteriorate. Creatinine continued to increase and reached 4.3 with oliguria. He was started on CRRT for a few days and tolerated well. Renal u/s was negative for obstruction and liver u/s without findings of cirrhosis. All of this was progression of cardiorenal syndrome with liver congestion from severe volume overload. On arrival vitals stable and in no acute distress. He has obvious anasarca up to the chest. He did  have uop of 500 at time of arrival also.    TTE 5/28/19  · Moderate left ventricular enlargement.  · Severely decreased left ventricular systolic function. The estimated ejection fraction is 20%  · Global hypokinetic wall motion.  · Grade III (severe) left ventricular diastolic dysfunction consistent with restrictive physiology.  · Severe left atrial enlargement.  · Moderate right ventricular enlargement.  · Low normal right ventricular systolic function.  · Mild right atrial enlargement.  · Moderate mitral regurgitation.  Mild to moderate tricuspid regurgitation    * Cardiogenic shock  -NICM; Likely Etoh induced  -Last 2D Echo 1/16/20: LVEF 20%, LVEDD 6.92 cm   -Transferred from [facility] with IABP at 1:1 for further level of care  -Continued IABP and ; increased Lasix infusion to 30mg/hr  -Hypervolemic on examination today  -CVP: 17, SVO2: 64, CO: 7.07, CI: 3.05, SVR: 837.  Will continue lasix infusion at current dose and consider diuril if UOP drops.  Continue IABP and .   -GDMT: Will start low dose Isordil and Hydralazine today.  Patient was on Entresto prior to admission  -Patient is not currently being evaluated for OHTx/VAD; currenlty too ill given ANJELICA. Will likely need workup soon if renal recovery.   -2g Na dietary restriction, 1500 mL fluid restriction, strict I/Os      ANJELICA (acute kidney injury)  -Upon admit here; creat was down to 3 from 4.3 at OSH. Admitted at 1.4 per records and with normal function ~ 8 mos ago. Cardiorenal etiology most likely  -Creatinine improving with diuresis.  -Will continue to monitor  - avoid nephrotoxic agents     AICD (automatic cardioverter/defibrillator) present  -Medtronic placed 2019 for primary prevention    Essential hypertension  -Currently normotensive   -Will resume meds once more stabilized     Deep vein thrombosis (DVT) of right lower extremity  -Unsure of timing but was on eliquis PTA.   - Place on heparin gtt for now      Pruritus  -triamcinolone  cream, atarax, Zyrtec and Famotidine       Uninterrupted Critical Care/Counseling Time (not including procedures): 60 minutes      CONG Peterson  Heart Transplant  [facility]

## 2020-01-17 NOTE — SUBJECTIVE & OBJECTIVE
Interval History: Patient diuresed well on IV Lasix at 30mg/hr.  He is net negative 4.3L in the last 24 hours.  CVP: 17, SVO2: 64, CO: 7.07, CI: 3.05, SVR: 837.  He complains of itching despite Triamcinolone cream and Benadryl.     Continuous Infusions:   DOBUTamine 5 mcg/kg/min (01/17/20 0900)    furosemide (LASIX) 2 mg/mL infusion (non-titrating) 30 mg/hr (01/17/20 1038)    heparin (porcine) in D5W 16 Units/kg/hr (01/17/20 0900)    nitric oxide gas       Scheduled Meds:   acetaminophen  1,000 mg Oral Q8H    atorvastatin  40 mg Oral Daily    cetirizine  10 mg Oral Daily    famotidine  20 mg Oral Daily    hydrALAZINE  10 mg Oral Q12H    isosorbide dinitrate  10 mg Oral BID    melatonin  6 mg Oral Nightly    polyethylene glycol  17 g Oral Daily    triamcinolone acetonide 0.5%   Topical (Top) TID     PRN Meds:albuterol, bisacodyl, heparin (PORCINE), heparin (PORCINE), hydrOXYzine HCl, sodium chloride 0.9%, traMADol    Review of patient's allergies indicates:   Allergen Reactions    Iodine and iodide containing products      Objective:     Vital Signs (Most Recent):  Temp: 96.9 °F (36.1 °C) (01/17/20 0730)  Pulse: (!) 113 (01/17/20 0926)  Resp: (!) 22 (01/17/20 0926)  BP: 125/87 (01/17/20 0730)  SpO2: 98 % (01/17/20 0900) Vital Signs (24h Range):  Temp:  [96.9 °F (36.1 °C)-97.6 °F (36.4 °C)] 96.9 °F (36.1 °C)  Pulse:  [] 113  Resp:  [16-45] 22  SpO2:  [91 %-100 %] 98 %  BP: (105-136)/(11-92) 125/87     Patient Vitals for the past 72 hrs (Last 3 readings):   Weight   01/16/20 0100 114 kg (251 lb 5.2 oz)   01/15/20 2027 114 kg (251 lb 5.2 oz)     Body mass index is 39.36 kg/m².      Intake/Output Summary (Last 24 hours) at 1/17/2020 1057  Last data filed at 1/17/2020 0927  Gross per 24 hour   Intake 1890.83 ml   Output 6255 ml   Net -4364.17 ml       Hemodynamic Parameters:       Telemetry: reviewed  Physical Exam  Constitutional: laying in bed NAD;   Neck: Normal range of motion. Neck supple. JVD  present but difficult to access due to body habiuts. Right trialysis and RIJ in place    Cardiovascular: Normal rate, regular rhythm and intact distal pulses. No murmur heard.  Pulmonary/Chest: Effort normal. No respiratory distress.   Abdominal: Soft. He exhibits distension. A hernia is present.   Musculoskeletal: He exhibits edema.   Neurological: He is alert and oriented to person, place, and time.   Skin: Skin is warm. Capillary refill takes 2 to 3 seconds.   Psychiatric: He has a normal mood and affect.   Nursing note and vitals reviewed.    Significant Labs:  CBC:  Recent Labs   Lab 01/15/20  2001 01/16/20  0448 01/17/20  0424   WBC 7.68 9.47 9.15   RBC 4.36* 4.21* 4.29*   HGB 12.0* 11.6* 11.7*   HCT 36.8* 35.5* 36.8*    200 176   MCV 84 84 86   MCH 27.5 27.6 27.3   MCHC 32.6 32.7 31.8*     BNP:  Recent Labs   Lab 01/15/20  2001 01/16/20  1106 01/17/20 0424   BNP 3,525* 2,534* 1,851*     CMP:  Recent Labs   Lab 01/16/20 0448 01/16/20 1812 01/17/20 0424   GLU 79 105 76   CALCIUM 8.8 8.3* 8.5*   ALBUMIN 2.9* 2.9* 3.1*   PROT 6.4 6.3 6.6   * 136 138   K 3.9 4.0 3.5   CO2 24 27 27    100 99   BUN 33* 29* 27*   CREATININE 3.0* 2.6* 2.2*   ALKPHOS 121 121 126   ALT 13 16 15   AST 24 25 26   BILITOT 1.4* 1.3* 1.5*      Coagulation:   Recent Labs   Lab 01/15/20  2001  01/16/20  1812 01/17/20  0017 01/17/20 0424   INR 2.0*  --   --   --   --    APTT 26.1   < > 45.4* 40.4* 35.2*    < > = values in this interval not displayed.     LDH:  No results for input(s): LDH in the last 72 hours.  Microbiology:  Microbiology Results (last 7 days)     ** No results found for the last 168 hours. **          I have reviewed all pertinent labs within the past 24 hours.    Estimated Creatinine Clearance: 45.8 mL/min (A) (based on SCr of 2.2 mg/dL (H)).    Diagnostic Results:  I have reviewed all pertinent imaging results/findings within the past 24 hours.

## 2020-01-17 NOTE — NURSING
Per MD Daniel, turn heparin gtt off at 1200. Fellow will be by around 1400 to pull trialysis line out due to line being in same area as central line.

## 2020-01-18 LAB
ALBUMIN SERPL BCP-MCNC: 2.9 G/DL (ref 3.5–5.2)
ALBUMIN SERPL BCP-MCNC: 2.9 G/DL (ref 3.5–5.2)
ALLENS TEST: ABNORMAL
ALLENS TEST: ABNORMAL
ALP SERPL-CCNC: 122 U/L (ref 55–135)
ALP SERPL-CCNC: 127 U/L (ref 55–135)
ALT SERPL W/O P-5'-P-CCNC: 15 U/L (ref 10–44)
ALT SERPL W/O P-5'-P-CCNC: 16 U/L (ref 10–44)
ANION GAP SERPL CALC-SCNC: 10 MMOL/L (ref 8–16)
ANION GAP SERPL CALC-SCNC: 10 MMOL/L (ref 8–16)
ANION GAP SERPL CALC-SCNC: 8 MMOL/L (ref 8–16)
APTT BLDCRRT: 52.8 SEC (ref 21–32)
AST SERPL-CCNC: 26 U/L (ref 10–40)
AST SERPL-CCNC: 27 U/L (ref 10–40)
BASOPHILS # BLD AUTO: 0.03 K/UL (ref 0–0.2)
BASOPHILS NFR BLD: 0.3 % (ref 0–1.9)
BILIRUB SERPL-MCNC: 1.1 MG/DL (ref 0.1–1)
BILIRUB SERPL-MCNC: 1.3 MG/DL (ref 0.1–1)
BUN SERPL-MCNC: 21 MG/DL (ref 6–20)
BUN SERPL-MCNC: 25 MG/DL (ref 6–20)
BUN SERPL-MCNC: 25 MG/DL (ref 6–20)
CALCIUM SERPL-MCNC: 8.4 MG/DL (ref 8.7–10.5)
CALCIUM SERPL-MCNC: 8.6 MG/DL (ref 8.7–10.5)
CALCIUM SERPL-MCNC: 8.6 MG/DL (ref 8.7–10.5)
CHLORIDE SERPL-SCNC: 97 MMOL/L (ref 95–110)
CO2 SERPL-SCNC: 32 MMOL/L (ref 23–29)
CO2 SERPL-SCNC: 32 MMOL/L (ref 23–29)
CO2 SERPL-SCNC: 34 MMOL/L (ref 23–29)
CREAT SERPL-MCNC: 1.8 MG/DL (ref 0.5–1.4)
CREAT SERPL-MCNC: 1.9 MG/DL (ref 0.5–1.4)
CREAT SERPL-MCNC: 1.9 MG/DL (ref 0.5–1.4)
DELSYS: ABNORMAL
DELSYS: ABNORMAL
DIFFERENTIAL METHOD: ABNORMAL
EOSINOPHIL # BLD AUTO: 0.2 K/UL (ref 0–0.5)
EOSINOPHIL NFR BLD: 2.2 % (ref 0–8)
ERYTHROCYTE [DISTWIDTH] IN BLOOD BY AUTOMATED COUNT: 17.3 % (ref 11.5–14.5)
EST. GFR  (AFRICAN AMERICAN): 44.9 ML/MIN/1.73 M^2
EST. GFR  (AFRICAN AMERICAN): 44.9 ML/MIN/1.73 M^2
EST. GFR  (AFRICAN AMERICAN): 47.9 ML/MIN/1.73 M^2
EST. GFR  (NON AFRICAN AMERICAN): 38.8 ML/MIN/1.73 M^2
EST. GFR  (NON AFRICAN AMERICAN): 38.8 ML/MIN/1.73 M^2
EST. GFR  (NON AFRICAN AMERICAN): 41.4 ML/MIN/1.73 M^2
FLOW: 4
FLOW: 4
GLUCOSE SERPL-MCNC: 104 MG/DL (ref 70–110)
GLUCOSE SERPL-MCNC: 88 MG/DL (ref 70–110)
GLUCOSE SERPL-MCNC: 88 MG/DL (ref 70–110)
HCO3 UR-SCNC: 34.1 MMOL/L (ref 24–28)
HCO3 UR-SCNC: 34.7 MMOL/L (ref 24–28)
HCT VFR BLD AUTO: 35.9 % (ref 40–54)
HGB BLD-MCNC: 11.2 G/DL (ref 14–18)
IMM GRANULOCYTES # BLD AUTO: 0.03 K/UL (ref 0–0.04)
IMM GRANULOCYTES NFR BLD AUTO: 0.3 % (ref 0–0.5)
LYMPHOCYTES # BLD AUTO: 1.1 K/UL (ref 1–4.8)
LYMPHOCYTES NFR BLD: 12.3 % (ref 18–48)
MAGNESIUM SERPL-MCNC: 1.7 MG/DL (ref 1.6–2.6)
MCH RBC QN AUTO: 26.7 PG (ref 27–31)
MCHC RBC AUTO-ENTMCNC: 31.2 G/DL (ref 32–36)
MCV RBC AUTO: 86 FL (ref 82–98)
METHEMOGLOBIN: 0.3 % (ref 0–3)
MODE: ABNORMAL
MODE: ABNORMAL
MONOCYTES # BLD AUTO: 0.8 K/UL (ref 0.3–1)
MONOCYTES NFR BLD: 8.5 % (ref 4–15)
NEUTROPHILS # BLD AUTO: 6.8 K/UL (ref 1.8–7.7)
NEUTROPHILS NFR BLD: 76.4 % (ref 38–73)
NRBC BLD-RTO: 0 /100 WBC
PCO2 BLDA: 53.2 MMHG (ref 35–45)
PCO2 BLDA: 54.7 MMHG (ref 35–45)
PH SMN: 7.41 [PH] (ref 7.35–7.45)
PH SMN: 7.41 [PH] (ref 7.35–7.45)
PHOSPHATE SERPL-MCNC: 4.2 MG/DL (ref 2.7–4.5)
PLATELET # BLD AUTO: 164 K/UL (ref 150–350)
PMV BLD AUTO: 10.1 FL (ref 9.2–12.9)
PO2 BLDA: 28 MMHG (ref 40–60)
PO2 BLDA: 34 MMHG (ref 40–60)
POC BE: 10 MMOL/L
POC BE: 10 MMOL/L
POC SATURATED O2: 52 % (ref 95–100)
POC SATURATED O2: 64 % (ref 95–100)
POC TCO2: 36 MMOL/L (ref 24–29)
POC TCO2: 36 MMOL/L (ref 24–29)
POTASSIUM SERPL-SCNC: 3.6 MMOL/L (ref 3.5–5.1)
POTASSIUM SERPL-SCNC: 3.7 MMOL/L (ref 3.5–5.1)
POTASSIUM SERPL-SCNC: 3.7 MMOL/L (ref 3.5–5.1)
PROT SERPL-MCNC: 6.4 G/DL (ref 6–8.4)
PROT SERPL-MCNC: 6.5 G/DL (ref 6–8.4)
RBC # BLD AUTO: 4.2 M/UL (ref 4.6–6.2)
SAMPLE: ABNORMAL
SAMPLE: ABNORMAL
SITE: ABNORMAL
SITE: ABNORMAL
SODIUM SERPL-SCNC: 139 MMOL/L (ref 136–145)
WBC # BLD AUTO: 8.94 K/UL (ref 3.9–12.7)

## 2020-01-18 PROCEDURE — 20000000 HC ICU ROOM

## 2020-01-18 PROCEDURE — 99291 CRITICAL CARE FIRST HOUR: CPT | Mod: ,,, | Performed by: INTERNAL MEDICINE

## 2020-01-18 PROCEDURE — 83735 ASSAY OF MAGNESIUM: CPT

## 2020-01-18 PROCEDURE — 80053 COMPREHEN METABOLIC PANEL: CPT

## 2020-01-18 PROCEDURE — 25000003 PHARM REV CODE 250: Performed by: INTERNAL MEDICINE

## 2020-01-18 PROCEDURE — 86901 BLOOD TYPING SEROLOGIC RH(D): CPT

## 2020-01-18 PROCEDURE — 27000202 HC IABP, ADD'L DAY

## 2020-01-18 PROCEDURE — 25000003 PHARM REV CODE 250: Performed by: NURSE PRACTITIONER

## 2020-01-18 PROCEDURE — 27000221 HC OXYGEN, UP TO 24 HOURS

## 2020-01-18 PROCEDURE — 25000003 PHARM REV CODE 250: Performed by: STUDENT IN AN ORGANIZED HEALTH CARE EDUCATION/TRAINING PROGRAM

## 2020-01-18 PROCEDURE — 63600175 PHARM REV CODE 636 W HCPCS: Performed by: PHYSICIAN ASSISTANT

## 2020-01-18 PROCEDURE — 99291 PR CRITICAL CARE, E/M 30-74 MINUTES: ICD-10-PCS | Mod: ,,, | Performed by: INTERNAL MEDICINE

## 2020-01-18 PROCEDURE — 99900035 HC TECH TIME PER 15 MIN (STAT)

## 2020-01-18 PROCEDURE — 25000003 PHARM REV CODE 250: Performed by: PHYSICIAN ASSISTANT

## 2020-01-18 PROCEDURE — 85730 THROMBOPLASTIN TIME PARTIAL: CPT

## 2020-01-18 PROCEDURE — 63600367 HC NITRIC OXIDE PER HOUR

## 2020-01-18 PROCEDURE — 63600175 PHARM REV CODE 636 W HCPCS: Performed by: INTERNAL MEDICINE

## 2020-01-18 PROCEDURE — 82803 BLOOD GASES ANY COMBINATION: CPT

## 2020-01-18 PROCEDURE — 85025 COMPLETE CBC W/AUTO DIFF WBC: CPT

## 2020-01-18 PROCEDURE — 83050 HGB METHEMOGLOBIN QUAN: CPT

## 2020-01-18 PROCEDURE — 63600175 PHARM REV CODE 636 W HCPCS: Performed by: STUDENT IN AN ORGANIZED HEALTH CARE EDUCATION/TRAINING PROGRAM

## 2020-01-18 PROCEDURE — 94761 N-INVAS EAR/PLS OXIMETRY MLT: CPT

## 2020-01-18 PROCEDURE — 84100 ASSAY OF PHOSPHORUS: CPT

## 2020-01-18 RX ORDER — POTASSIUM CHLORIDE 20 MEQ/1
40 TABLET, EXTENDED RELEASE ORAL ONCE
Status: COMPLETED | OUTPATIENT
Start: 2020-01-18 | End: 2020-01-18

## 2020-01-18 RX ORDER — HYDRALAZINE HYDROCHLORIDE 25 MG/1
25 TABLET, FILM COATED ORAL EVERY 8 HOURS
Status: DISCONTINUED | OUTPATIENT
Start: 2020-01-18 | End: 2020-01-20

## 2020-01-18 RX ORDER — HYDRALAZINE HYDROCHLORIDE 25 MG/1
25 TABLET, FILM COATED ORAL EVERY 8 HOURS
Status: DISCONTINUED | OUTPATIENT
Start: 2020-01-18 | End: 2020-01-18

## 2020-01-18 RX ORDER — ISOSORBIDE DINITRATE 10 MG/1
10 TABLET ORAL EVERY 8 HOURS
Status: DISCONTINUED | OUTPATIENT
Start: 2020-01-18 | End: 2020-01-18

## 2020-01-18 RX ORDER — POTASSIUM CHLORIDE 20 MEQ/1
20 TABLET, EXTENDED RELEASE ORAL ONCE
Status: COMPLETED | OUTPATIENT
Start: 2020-01-18 | End: 2020-01-18

## 2020-01-18 RX ORDER — MAGNESIUM SULFATE HEPTAHYDRATE 40 MG/ML
2 INJECTION, SOLUTION INTRAVENOUS ONCE
Status: COMPLETED | OUTPATIENT
Start: 2020-01-18 | End: 2020-01-18

## 2020-01-18 RX ORDER — ISOSORBIDE DINITRATE 10 MG/1
10 TABLET ORAL EVERY 8 HOURS
Status: DISPENSED | OUTPATIENT
Start: 2020-01-18 | End: 2020-02-01

## 2020-01-18 RX ORDER — AMOXICILLIN 250 MG
2 CAPSULE ORAL 2 TIMES DAILY
Status: DISCONTINUED | OUTPATIENT
Start: 2020-01-18 | End: 2020-02-06

## 2020-01-18 RX ADMIN — HYDRALAZINE HYDROCHLORIDE 25 MG: 25 TABLET, FILM COATED ORAL at 09:01

## 2020-01-18 RX ADMIN — HYDROXYZINE HYDROCHLORIDE 25 MG: 25 TABLET, FILM COATED ORAL at 08:01

## 2020-01-18 RX ADMIN — Medication 6 MG: at 09:01

## 2020-01-18 RX ADMIN — FUROSEMIDE 30 MG/HR: 10 INJECTION, SOLUTION INTRAMUSCULAR; INTRAVENOUS at 06:01

## 2020-01-18 RX ADMIN — TRIAMCINOLONE ACETONIDE: 5 CREAM TOPICAL at 08:01

## 2020-01-18 RX ADMIN — TRIAMCINOLONE ACETONIDE: 5 CREAM TOPICAL at 05:01

## 2020-01-18 RX ADMIN — HYDRALAZINE HYDROCHLORIDE 25 MG: 25 TABLET, FILM COATED ORAL at 01:01

## 2020-01-18 RX ADMIN — FUROSEMIDE 30 MG/HR: 10 INJECTION, SOLUTION INTRAMUSCULAR; INTRAVENOUS at 08:01

## 2020-01-18 RX ADMIN — DOBUTAMINE IN DEXTROSE 5 MCG/KG/MIN: 200 INJECTION, SOLUTION INTRAVENOUS at 02:01

## 2020-01-18 RX ADMIN — HEPARIN SODIUM AND DEXTROSE 16 UNITS/KG/HR: 10000; 5 INJECTION INTRAVENOUS at 12:01

## 2020-01-18 RX ADMIN — TRIAMCINOLONE ACETONIDE: 5 CREAM TOPICAL at 10:01

## 2020-01-18 RX ADMIN — FAMOTIDINE 20 MG: 20 TABLET ORAL at 08:01

## 2020-01-18 RX ADMIN — STANDARDIZED SENNA CONCENTRATE AND DOCUSATE SODIUM 2 TABLET: 8.6; 5 TABLET ORAL at 08:01

## 2020-01-18 RX ADMIN — ISOSORBIDE DINITRATE 10 MG: 10 TABLET ORAL at 09:01

## 2020-01-18 RX ADMIN — HYDROXYZINE HYDROCHLORIDE 25 MG: 25 TABLET, FILM COATED ORAL at 12:01

## 2020-01-18 RX ADMIN — FUROSEMIDE 30 MG/HR: 10 INJECTION, SOLUTION INTRAMUSCULAR; INTRAVENOUS at 02:01

## 2020-01-18 RX ADMIN — CETIRIZINE HYDROCHLORIDE 10 MG: 10 TABLET, FILM COATED ORAL at 08:01

## 2020-01-18 RX ADMIN — POTASSIUM CHLORIDE 20 MEQ: 1500 TABLET, EXTENDED RELEASE ORAL at 06:01

## 2020-01-18 RX ADMIN — ISOSORBIDE DINITRATE 10 MG: 10 TABLET ORAL at 01:01

## 2020-01-18 RX ADMIN — STANDARDIZED SENNA CONCENTRATE AND DOCUSATE SODIUM 2 TABLET: 8.6; 5 TABLET ORAL at 12:01

## 2020-01-18 RX ADMIN — ISOSORBIDE DINITRATE 10 MG: 10 TABLET ORAL at 08:01

## 2020-01-18 RX ADMIN — ACETAMINOPHEN 1000 MG: 500 TABLET ORAL at 01:01

## 2020-01-18 RX ADMIN — ACETAMINOPHEN 1000 MG: 500 TABLET ORAL at 05:01

## 2020-01-18 RX ADMIN — POTASSIUM CHLORIDE 40 MEQ: 1500 TABLET, EXTENDED RELEASE ORAL at 07:01

## 2020-01-18 RX ADMIN — ATORVASTATIN CALCIUM 40 MG: 20 TABLET, FILM COATED ORAL at 08:01

## 2020-01-18 RX ADMIN — ACETAMINOPHEN 1000 MG: 500 TABLET ORAL at 09:01

## 2020-01-18 RX ADMIN — POLYETHYLENE GLYCOL 3350 17 G: 17 POWDER, FOR SOLUTION ORAL at 08:01

## 2020-01-18 RX ADMIN — HYDRALAZINE HYDROCHLORIDE 25 MG: 25 TABLET, FILM COATED ORAL at 08:01

## 2020-01-18 RX ADMIN — MAGNESIUM SULFATE IN WATER 2 G: 40 INJECTION, SOLUTION INTRAVENOUS at 06:01

## 2020-01-18 RX ADMIN — TRAMADOL HYDROCHLORIDE 50 MG: 50 TABLET, FILM COATED ORAL at 12:01

## 2020-01-18 NOTE — PROGRESS NOTES
[facility]  Heart Transplant  Progress Note    Patient Name: Saba Lott  MRN: [id number]  Admission Date: 1/15/2020  Hospital Length of Stay: 3 days  Attending Physician: Lian Daniel MD  Primary Care Provider: Primary Doctor No  Principal Problem:Cardiogenic shock    Subjective:     **Interval History: Feels better. Pruritis improving. Net -ve 3.7L past 24 hours, CVP still 20    Continuous Infusions:   DOBUTamine 5 mcg/kg/min (01/18/20 1100)    furosemide (LASIX) 2 mg/mL infusion (non-titrating) 30 mg/hr (01/18/20 1100)    heparin (porcine) in D5W 15.944 Units/kg/hr (01/18/20 1100)    nitric oxide gas       Scheduled Meds:   acetaminophen  1,000 mg Oral Q8H    atorvastatin  40 mg Oral Daily    cetirizine  10 mg Oral Daily    famotidine  20 mg Oral Daily    hydrALAZINE  25 mg Oral Q8H    isosorbide dinitrate  10 mg Oral Q8H    magnesium sulfate IVPB  1 g Intravenous Once    melatonin  6 mg Oral Nightly    polyethylene glycol  17 g Oral Daily    triamcinolone acetonide 0.5%   Topical (Top) TID     PRN Meds:albuterol, bisacodyl, heparin (PORCINE), heparin (PORCINE), hydrOXYzine HCl, sodium chloride 0.9%, traMADol    Review of patient's allergies indicates:   Allergen Reactions    Iodine and iodide containing products      Objective:     Vital Signs (Most Recent):  Temp: 97.8 °F (36.6 °C) (01/18/20 1100)  Pulse: 96 (01/18/20 1100)  Resp: 15 (01/18/20 1100)  BP: 137/65 (01/18/20 0705)  SpO2: 98 % (01/18/20 1100) Vital Signs (24h Range):  Temp:  [97.7 °F (36.5 °C)-98.7 °F (37.1 °C)] 97.8 °F (36.6 °C)  Pulse:  [] 96  Resp:  [15-31] 15  SpO2:  [93 %-100 %] 98 %  BP: (137)/(65) 137/65     Patient Vitals for the past 72 hrs (Last 3 readings):   Weight   01/18/20 0632 118.3 kg (260 lb 12.9 oz)   01/16/20 0100 114 kg (251 lb 5.2 oz)   01/15/20 2027 114 kg (251 lb 5.2 oz)     Body mass index is 40.85 kg/m².      Intake/Output Summary (Last 24 hours) at 1/18/2020 1132  Last data filed  at 1/18/2020 1100  Gross per 24 hour   Intake 2101.4 ml   Output 6160 ml   Net -4058.6 ml       Hemodynamic Parameters:  CVP:  [19 mmHg] 19 mmHg    Telemetry: ST with PVC's    Physical Exam   Constitutional: He is oriented to person, place, and time. He appears well-developed and well-nourished.   HENT:   Head: Normocephalic and atraumatic.   Eyes: Pupils are equal, round, and reactive to light. Conjunctivae and EOM are normal.   Neck: Normal range of motion. Neck supple. No thyromegaly present.   RIJ line   Cardiovascular: Normal rate and regular rhythm.   + S3   Pulmonary/Chest: Effort normal and breath sounds normal.   Abdominal: Soft. Bowel sounds are normal.   Musculoskeletal:   3-4+ edema from top of thighs down, including some scrotal edema   Neurological: He is alert and oriented to person, place, and time.   Skin: Skin is warm and dry. Capillary refill takes 2 to 3 seconds.   Psychiatric: He has a normal mood and affect. His behavior is normal. Judgment and thought content normal.       Significant Labs:  CBC:  Recent Labs   Lab 01/16/20 0448 01/17/20  0424 01/18/20  0334   WBC 9.47 9.15 8.94   RBC 4.21* 4.29* 4.20*   HGB 11.6* 11.7* 11.2*   HCT 35.5* 36.8* 35.9*    176 164   MCV 84 86 86   MCH 27.6 27.3 26.7*   MCHC 32.7 31.8* 31.2*     BNP:  Recent Labs   Lab 01/15/20  2001 01/16/20  1106 01/17/20  0424   BNP 3,525* 2,534* 1,851*     CMP:  Recent Labs   Lab 01/17/20  0424 01/17/20  1443 01/18/20  0334   GLU 76 94  94 88  88   CALCIUM 8.5* 8.6*  8.6* 8.6*  8.6*   ALBUMIN 3.1* 2.9* 2.9*   PROT 6.6 6.3 6.5    138  138 139  139   K 3.5 4.1  4.1 3.7  3.7   CO2 27 31*  31* 32*  32*   CL 99 99  99 97  97   BUN 27* 25*  25* 25*  25*   CREATININE 2.2* 2.2*  2.2* 1.9*  1.9*   ALKPHOS 126 121 127   ALT 15 14 16   AST 26 27 27   BILITOT 1.5* 1.4* 1.3*      Coagulation:   Recent Labs   Lab 01/15/20  2001  01/17/20  0424 01/17/20  2317 01/18/20  0334   INR 2.0*  --   --   --   --    APTT  26.1   < > 35.2* 49.0* 52.8*    < > = values in this interval not displayed.     LDH:  No results for input(s): LDH in the last 72 hours.  Microbiology:  Microbiology Results (last 7 days)     ** No results found for the last 168 hours. **          I have reviewed all pertinent labs within the past 24 hours.    Estimated Creatinine Clearance: 54.1 mL/min (A) (based on SCr of 1.9 mg/dL (H)).    Diagnostic Results:  I have reviewed all pertinent imaging results/findings within the past 24 hours.    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 Chronic MARC - Class II-III and mild LE edema.      Hospital Course (synopsis of major diagnoses, care, treatment, and services provided during the course of the hospital stay):  -2/12 admit to CDU for diuresis with IV lasix  -IV abx   -CXR with suspected small left pleural effusion with associated left basilar atelectasis versus evolving airspace disease  -2/13 single chamber ICD implant; IV lasix decreased to BID  -2/16 add dobutamine, hold BB due to hypotension, no ACE-I or ARB due to recent HPI hypotension  -2/17 Lasix changed to PO  -2/18 dobutamine discontinued    Admitted to [facility] on Thursday due to severe SOB for a week with associated orthopnea, MARC, and PND unable to lie flat and found to be in acute diastolic heart failure. States he normally weighs around 219 but up to 254lb on admission tonight. Says he hasn't been the most compliant in terms of fluid restriction and daily weights but has avoided salt. BNP on admission was 2375. BUN/CRT 32/1.4 He was started on IV diuretics but continued to deteriorate. Creatinine continued to increase and reached 4.3 with oliguria. He was started on CRRT for a few days and tolerated well. Renal u/s was negative for obstruction and  liver u/s without findings of cirrhosis. All of this was progression of cardiorenal syndrome with liver congestion from severe volume overload. On arrival vitals stable and in no acute distress. He has obvious anasarca up to the chest. He did have uop of 500 at time of arrival also.    TTE 5/28/19  · Moderate left ventricular enlargement.  · Severely decreased left ventricular systolic function. The estimated ejection fraction is 20%  · Global hypokinetic wall motion.  · Grade III (severe) left ventricular diastolic dysfunction consistent with restrictive physiology.  · Severe left atrial enlargement.  · Moderate right ventricular enlargement.  · Low normal right ventricular systolic function.  · Mild right atrial enlargement.  · Moderate mitral regurgitation.  Mild to moderate tricuspid regurgitation    * Cardiogenic shock  -NICM; Likely Etoh induced  -Last 2D Echo 1/16/20: LVEF 20%, LVEDD 6.92 cm   -Transferred from [facility] with IABP at 1:1 for further level of care  -Continued IABP and ; diuresing well on Lasix at 30 mg/hr and creatinine has trended down to 1.9  -Hypervolemic on examination today  -CVP: 20, SVO2: 64. Will continue lasix infusion at current dose and consider diuril if UOP drops.  Continue IABP,  at 5 mcg/kg/min and Radha at 10 ppm  -GDMT: Increase Isordil and Hydralazine today.  Patient was on Entresto prior to admission  -Patient is not currently being evaluated for OHTx/VAD; currently too ill given ANJELICA. Will likely need workup soon if renal recovery.   -2g Na dietary restriction, 1500 mL fluid restriction, strict I/Os      Pruritus  -triamcinolone cream, atarax, Zyrtec and Famotidine   -improving    ANJELICA (acute kidney injury)  -Upon admit here; creat was down to 3 from 4.3 at OSH. Admitted at 1.4 per records and with normal function ~ 8 mos ago. Cardiorenal etiology most likely  -Creatinine improving with diuresis.  -Will continue to monitor  - avoid nephrotoxic agents      Essential hypertension  -Currently essentially normotensive   -Increase Hyd/Isordil    Deep vein thrombosis (DVT) of right lower extremity  -Unsure of timing but was on eliquis PTA.   -Continue on heparin gtt for now      AICD (automatic cardioverter/defibrillator) present  -Medtronic placed 2019 for primary [place] [id number]  Heart Transplant  [facility]

## 2020-01-18 NOTE — PLAN OF CARE
CMICU DAILY GOALS       A: Awake    RASS: Goal - RASS Goal: 0-->alert and calm  Actual - RASS (Mcmillan Agitation-Sedation Scale): 0-->alert and calm   Restraint necessity: Clinical Justification: Removing medical devices  B: Breath   SBT: Not intubated   C: Coordinate A & B, analgesics/sedatives   Pain: managed    SAT: Not intubated  D: Delirium   CAM-ICU: Overall CAM-ICU: Negative  E: Early Mobility   MOVE Screen: Pass   Activity: Activity Management: bedrest maintained per order  FAS: Feeding/Nutrition   Diet order: Diet/Nutrition Received: low saturated fat/low cholesterol,   Fluid restriction: Fluid Requirement: 1500  T: Thrombus   DVT prophylaxis: VTE Required Core Measure: Pharmacological prophylaxis initiated/maintained  H: HOB Elevation   Head of Bed (HOB): HOB at 30 degrees  U: Ulcer Prophylaxis   GI: yes  G: Glucose control   NA  S: Skin   Bundle compliance: yes   Bathing/Skin Care: bath, chlorhexidine, bath, complete, linen changed, dressed/undressed   B: Bowel Function   no issues   I: Indwelling Catheters   Mc necessity:      Urethral Catheter 01/15/20 2030 Straight-tip 16 Fr.-Reason for Continuing Urinary Catheterization: Critically ill in ICU requiring intensive monitoring   CVC necessity: Yes   IPAD offered: No  D: De-escalation Antibx   No  Plan for the day   Continue lasix, dobutamine, and heparin drips. Continue IABP support. Keep patient on nitric. Still working up for advanced options.  Family/Goals of care/Code Status   Code Status: Full Code     No acute events throughout day, VS and assessment per flow sheet, SVO2 64, CVP 19. Patient progressing towards goals as tolerated, plan of care reviewed with Saba Lott and family, all concerns addressed, will continue to monitor.

## 2020-01-18 NOTE — ASSESSMENT & PLAN NOTE
-NICM; Likely Etoh induced  -Last 2D Echo 1/16/20: LVEF 20%, LVEDD 6.92 cm   -Transferred from [facility] with IABP at 1:1 for further level of care  -Continued IABP and ; diuresing well on Lasix at 30 mg/hr and creatinine has trended down to 1.9  -Hypervolemic on examination today  -CVP: 20, SVO2: 64. Will continue lasix infusion at current dose and consider diuril if UOP drops.  Continue IABP,  at 5 mcg/kg/min and Radha at 10 ppm  -GDMT: Increase Isordil and Hydralazine today.  Patient was on Entresto prior to admission  -Patient is not currently being evaluated for OHTx/VAD; currently too ill given ANJELICA. Will likely need workup soon if renal recovery.   -2g Na dietary restriction, 1500 mL fluid restriction, strict I/Os

## 2020-01-18 NOTE — PROGRESS NOTES
01/18/2020  Abelardo Sepulveda    Current provider:  Lian Daniel MD      I, Abelardo Sepulveda, rounded on Saba Lott to ensure all mechanical assist device settings (IABP or VAD) were appropriate and all parameters were within limits.  I was able to ensure all back up equipment was present, the staff had no issues, and the Perfusion Department daily rounding was complete.    3:14 PM

## 2020-01-18 NOTE — SUBJECTIVE & OBJECTIVE
**Interval History: Feels better. Pruritis improving. Net -ve 3.7L past 24 hours, CVP still 20    Continuous Infusions:   DOBUTamine 5 mcg/kg/min (01/18/20 1100)    furosemide (LASIX) 2 mg/mL infusion (non-titrating) 30 mg/hr (01/18/20 1100)    heparin (porcine) in D5W 15.944 Units/kg/hr (01/18/20 1100)    nitric oxide gas       Scheduled Meds:   acetaminophen  1,000 mg Oral Q8H    atorvastatin  40 mg Oral Daily    cetirizine  10 mg Oral Daily    famotidine  20 mg Oral Daily    hydrALAZINE  25 mg Oral Q8H    isosorbide dinitrate  10 mg Oral Q8H    magnesium sulfate IVPB  1 g Intravenous Once    melatonin  6 mg Oral Nightly    polyethylene glycol  17 g Oral Daily    triamcinolone acetonide 0.5%   Topical (Top) TID     PRN Meds:albuterol, bisacodyl, heparin (PORCINE), heparin (PORCINE), hydrOXYzine HCl, sodium chloride 0.9%, traMADol    Review of patient's allergies indicates:   Allergen Reactions    Iodine and iodide containing products      Objective:     Vital Signs (Most Recent):  Temp: 97.8 °F (36.6 °C) (01/18/20 1100)  Pulse: 96 (01/18/20 1100)  Resp: 15 (01/18/20 1100)  BP: 137/65 (01/18/20 0705)  SpO2: 98 % (01/18/20 1100) Vital Signs (24h Range):  Temp:  [97.7 °F (36.5 °C)-98.7 °F (37.1 °C)] 97.8 °F (36.6 °C)  Pulse:  [] 96  Resp:  [15-31] 15  SpO2:  [93 %-100 %] 98 %  BP: (137)/(65) 137/65     Patient Vitals for the past 72 hrs (Last 3 readings):   Weight   01/18/20 0632 118.3 kg (260 lb 12.9 oz)   01/16/20 0100 114 kg (251 lb 5.2 oz)   01/15/20 2027 114 kg (251 lb 5.2 oz)     Body mass index is 40.85 kg/m².      Intake/Output Summary (Last 24 hours) at 1/18/2020 1132  Last data filed at 1/18/2020 1100  Gross per 24 hour   Intake 2101.4 ml   Output 6160 ml   Net -4058.6 ml       Hemodynamic Parameters:  CVP:  [19 mmHg] 19 mmHg    Telemetry: ST with PVC's    Physical Exam   Constitutional: He is oriented to person, place, and time. He appears well-developed and well-nourished.   HENT:    Head: Normocephalic and atraumatic.   Eyes: Pupils are equal, round, and reactive to light. Conjunctivae and EOM are normal.   Neck: Normal range of motion. Neck supple. No thyromegaly present.   RIJ line   Cardiovascular: Normal rate and regular rhythm.   + S3   Pulmonary/Chest: Effort normal and breath sounds normal.   Abdominal: Soft. Bowel sounds are normal.   Musculoskeletal:   3-4+ edema from top of thighs down, including some scrotal edema   Neurological: He is alert and oriented to person, place, and time.   Skin: Skin is warm and dry. Capillary refill takes 2 to 3 seconds.   Psychiatric: He has a normal mood and affect. His behavior is normal. Judgment and thought content normal.       Significant Labs:  CBC:  Recent Labs   Lab 01/16/20 0448 01/17/20  0424 01/18/20  0334   WBC 9.47 9.15 8.94   RBC 4.21* 4.29* 4.20*   HGB 11.6* 11.7* 11.2*   HCT 35.5* 36.8* 35.9*    176 164   MCV 84 86 86   MCH 27.6 27.3 26.7*   MCHC 32.7 31.8* 31.2*     BNP:  Recent Labs   Lab 01/15/20  2001 01/16/20  1106 01/17/20  0424   BNP 3,525* 2,534* 1,851*     CMP:  Recent Labs   Lab 01/17/20 0424 01/17/20  1443 01/18/20  0334   GLU 76 94  94 88  88   CALCIUM 8.5* 8.6*  8.6* 8.6*  8.6*   ALBUMIN 3.1* 2.9* 2.9*   PROT 6.6 6.3 6.5    138  138 139  139   K 3.5 4.1  4.1 3.7  3.7   CO2 27 31*  31* 32*  32*   CL 99 99  99 97  97   BUN 27* 25*  25* 25*  25*   CREATININE 2.2* 2.2*  2.2* 1.9*  1.9*   ALKPHOS 126 121 127   ALT 15 14 16   AST 26 27 27   BILITOT 1.5* 1.4* 1.3*      Coagulation:   Recent Labs   Lab 01/15/20  2001  01/17/20  0424 01/17/20  2317 01/18/20  0334   INR 2.0*  --   --   --   --    APTT 26.1   < > 35.2* 49.0* 52.8*    < > = values in this interval not displayed.     LDH:  No results for input(s): LDH in the last 72 hours.  Microbiology:  Microbiology Results (last 7 days)     ** No results found for the last 168 hours. **          I have reviewed all pertinent labs within the past 24  hours.    Estimated Creatinine Clearance: 54.1 mL/min (A) (based on SCr of 1.9 mg/dL (H)).    Diagnostic Results:  I have reviewed all pertinent imaging results/findings within the past 24 hours.

## 2020-01-19 LAB
ABO + RH BLD: NORMAL
ALBUMIN SERPL BCP-MCNC: 3.1 G/DL (ref 3.5–5.2)
ALBUMIN SERPL BCP-MCNC: 3.1 G/DL (ref 3.5–5.2)
ALLENS TEST: ABNORMAL
ALP SERPL-CCNC: 121 U/L (ref 55–135)
ALP SERPL-CCNC: 121 U/L (ref 55–135)
ALT SERPL W/O P-5'-P-CCNC: 15 U/L (ref 10–44)
ALT SERPL W/O P-5'-P-CCNC: 15 U/L (ref 10–44)
ANION GAP SERPL CALC-SCNC: 12 MMOL/L (ref 8–16)
APTT BLDCRRT: 50.8 SEC (ref 21–32)
AST SERPL-CCNC: 27 U/L (ref 10–40)
AST SERPL-CCNC: 27 U/L (ref 10–40)
BASOPHILS # BLD AUTO: 0.03 K/UL (ref 0–0.2)
BASOPHILS NFR BLD: 0.3 % (ref 0–1.9)
BILIRUB SERPL-MCNC: 1.3 MG/DL (ref 0.1–1)
BILIRUB SERPL-MCNC: 1.3 MG/DL (ref 0.1–1)
BLD GP AB SCN CELLS X3 SERPL QL: NORMAL
BUN SERPL-MCNC: 20 MG/DL (ref 6–20)
CALCIUM SERPL-MCNC: 8.7 MG/DL (ref 8.7–10.5)
CHLORIDE SERPL-SCNC: 95 MMOL/L (ref 95–110)
CO2 SERPL-SCNC: 33 MMOL/L (ref 23–29)
CREAT SERPL-MCNC: 1.6 MG/DL (ref 0.5–1.4)
DELSYS: ABNORMAL
DIFFERENTIAL METHOD: ABNORMAL
EOSINOPHIL # BLD AUTO: 0.2 K/UL (ref 0–0.5)
EOSINOPHIL NFR BLD: 1.7 % (ref 0–8)
ERYTHROCYTE [DISTWIDTH] IN BLOOD BY AUTOMATED COUNT: 17.3 % (ref 11.5–14.5)
EST. GFR  (AFRICAN AMERICAN): 55.2 ML/MIN/1.73 M^2
EST. GFR  (NON AFRICAN AMERICAN): 47.8 ML/MIN/1.73 M^2
FLOW: 4
GLUCOSE SERPL-MCNC: 92 MG/DL (ref 70–110)
HCO3 UR-SCNC: 38.7 MMOL/L (ref 24–28)
HCT VFR BLD AUTO: 35.8 % (ref 40–54)
HGB BLD-MCNC: 11.3 G/DL (ref 14–18)
IMM GRANULOCYTES # BLD AUTO: 0.03 K/UL (ref 0–0.04)
IMM GRANULOCYTES NFR BLD AUTO: 0.3 % (ref 0–0.5)
LYMPHOCYTES # BLD AUTO: 1.2 K/UL (ref 1–4.8)
LYMPHOCYTES NFR BLD: 12.6 % (ref 18–48)
MAGNESIUM SERPL-MCNC: 1.6 MG/DL (ref 1.6–2.6)
MAGNESIUM SERPL-MCNC: 1.8 MG/DL (ref 1.6–2.6)
MCH RBC QN AUTO: 26.7 PG (ref 27–31)
MCHC RBC AUTO-ENTMCNC: 31.6 G/DL (ref 32–36)
MCV RBC AUTO: 85 FL (ref 82–98)
MODE: ABNORMAL
MONOCYTES # BLD AUTO: 0.8 K/UL (ref 0.3–1)
MONOCYTES NFR BLD: 7.9 % (ref 4–15)
NEUTROPHILS # BLD AUTO: 7.4 K/UL (ref 1.8–7.7)
NEUTROPHILS NFR BLD: 77.2 % (ref 38–73)
NRBC BLD-RTO: 0 /100 WBC
PCO2 BLDA: 52 MMHG (ref 35–45)
PH SMN: 7.48 [PH] (ref 7.35–7.45)
PHOSPHATE SERPL-MCNC: 3.7 MG/DL (ref 2.7–4.5)
PLATELET # BLD AUTO: 138 K/UL (ref 150–350)
PMV BLD AUTO: 10.2 FL (ref 9.2–12.9)
PO2 BLDA: 37 MMHG (ref 40–60)
POC BE: 15 MMOL/L
POC SATURATED O2: 73 % (ref 95–100)
POC TCO2: 40 MMOL/L (ref 24–29)
POTASSIUM SERPL-SCNC: 3.5 MMOL/L (ref 3.5–5.1)
POTASSIUM SERPL-SCNC: 3.7 MMOL/L (ref 3.5–5.1)
PROT SERPL-MCNC: 6.8 G/DL (ref 6–8.4)
PROT SERPL-MCNC: 6.8 G/DL (ref 6–8.4)
RBC # BLD AUTO: 4.23 M/UL (ref 4.6–6.2)
SAMPLE: ABNORMAL
SITE: ABNORMAL
SODIUM SERPL-SCNC: 140 MMOL/L (ref 136–145)
WBC # BLD AUTO: 9.54 K/UL (ref 3.9–12.7)

## 2020-01-19 PROCEDURE — 63600175 PHARM REV CODE 636 W HCPCS: Performed by: STUDENT IN AN ORGANIZED HEALTH CARE EDUCATION/TRAINING PROGRAM

## 2020-01-19 PROCEDURE — 99900035 HC TECH TIME PER 15 MIN (STAT)

## 2020-01-19 PROCEDURE — 25000003 PHARM REV CODE 250: Performed by: INTERNAL MEDICINE

## 2020-01-19 PROCEDURE — 84100 ASSAY OF PHOSPHORUS: CPT

## 2020-01-19 PROCEDURE — 85730 THROMBOPLASTIN TIME PARTIAL: CPT

## 2020-01-19 PROCEDURE — 25000003 PHARM REV CODE 250: Performed by: PHYSICIAN ASSISTANT

## 2020-01-19 PROCEDURE — 25000003 PHARM REV CODE 250: Performed by: STUDENT IN AN ORGANIZED HEALTH CARE EDUCATION/TRAINING PROGRAM

## 2020-01-19 PROCEDURE — 27200188 HC TRANSDUCER, ART ADULT/PEDS

## 2020-01-19 PROCEDURE — 83735 ASSAY OF MAGNESIUM: CPT | Mod: 91

## 2020-01-19 PROCEDURE — 63600175 PHARM REV CODE 636 W HCPCS: Performed by: PHYSICIAN ASSISTANT

## 2020-01-19 PROCEDURE — 20000000 HC ICU ROOM

## 2020-01-19 PROCEDURE — 85025 COMPLETE CBC W/AUTO DIFF WBC: CPT

## 2020-01-19 PROCEDURE — 84132 ASSAY OF SERUM POTASSIUM: CPT

## 2020-01-19 PROCEDURE — 27000221 HC OXYGEN, UP TO 24 HOURS

## 2020-01-19 PROCEDURE — 63600367 HC NITRIC OXIDE PER HOUR

## 2020-01-19 PROCEDURE — 94761 N-INVAS EAR/PLS OXIMETRY MLT: CPT

## 2020-01-19 PROCEDURE — 99291 CRITICAL CARE FIRST HOUR: CPT | Mod: ,,, | Performed by: NURSE PRACTITIONER

## 2020-01-19 PROCEDURE — 27000202 HC IABP, ADD'L DAY

## 2020-01-19 PROCEDURE — 99291 PR CRITICAL CARE, E/M 30-74 MINUTES: ICD-10-PCS | Mod: ,,, | Performed by: NURSE PRACTITIONER

## 2020-01-19 PROCEDURE — 80053 COMPREHEN METABOLIC PANEL: CPT

## 2020-01-19 PROCEDURE — 25000003 PHARM REV CODE 250: Performed by: NURSE PRACTITIONER

## 2020-01-19 RX ORDER — POTASSIUM CHLORIDE 20 MEQ/1
40 TABLET, EXTENDED RELEASE ORAL ONCE
Status: COMPLETED | OUTPATIENT
Start: 2020-01-19 | End: 2020-01-19

## 2020-01-19 RX ORDER — LANOLIN ALCOHOL/MO/W.PET/CERES
400 CREAM (GRAM) TOPICAL ONCE
Status: COMPLETED | OUTPATIENT
Start: 2020-01-19 | End: 2020-01-19

## 2020-01-19 RX ORDER — MAGNESIUM SULFATE HEPTAHYDRATE 40 MG/ML
2 INJECTION, SOLUTION INTRAVENOUS
Status: COMPLETED | OUTPATIENT
Start: 2020-01-19 | End: 2020-01-20

## 2020-01-19 RX ADMIN — MAGNESIUM SULFATE IN WATER 2 G: 40 INJECTION, SOLUTION INTRAVENOUS at 10:01

## 2020-01-19 RX ADMIN — ACETAMINOPHEN 1000 MG: 500 TABLET ORAL at 10:01

## 2020-01-19 RX ADMIN — TRIAMCINOLONE ACETONIDE: 5 CREAM TOPICAL at 02:01

## 2020-01-19 RX ADMIN — Medication 400 MG: at 06:01

## 2020-01-19 RX ADMIN — FUROSEMIDE 30 MG/HR: 10 INJECTION, SOLUTION INTRAMUSCULAR; INTRAVENOUS at 02:01

## 2020-01-19 RX ADMIN — HEPARIN SODIUM AND DEXTROSE 15.94 UNITS/KG/HR: 10000; 5 INJECTION INTRAVENOUS at 08:01

## 2020-01-19 RX ADMIN — HYDROXYZINE HYDROCHLORIDE 25 MG: 25 TABLET, FILM COATED ORAL at 08:01

## 2020-01-19 RX ADMIN — POTASSIUM CHLORIDE 40 MEQ: 1500 TABLET, EXTENDED RELEASE ORAL at 10:01

## 2020-01-19 RX ADMIN — ATORVASTATIN CALCIUM 40 MG: 20 TABLET, FILM COATED ORAL at 08:01

## 2020-01-19 RX ADMIN — HYDRALAZINE HYDROCHLORIDE 25 MG: 25 TABLET, FILM COATED ORAL at 09:01

## 2020-01-19 RX ADMIN — POTASSIUM CHLORIDE 40 MEQ: 1500 TABLET, EXTENDED RELEASE ORAL at 06:01

## 2020-01-19 RX ADMIN — HYDROXYZINE HYDROCHLORIDE 25 MG: 25 TABLET, FILM COATED ORAL at 04:01

## 2020-01-19 RX ADMIN — ISOSORBIDE DINITRATE 10 MG: 10 TABLET ORAL at 09:01

## 2020-01-19 RX ADMIN — ACETAMINOPHEN 1000 MG: 500 TABLET ORAL at 06:01

## 2020-01-19 RX ADMIN — FUROSEMIDE 30 MG/HR: 10 INJECTION, SOLUTION INTRAMUSCULAR; INTRAVENOUS at 10:01

## 2020-01-19 RX ADMIN — ISOSORBIDE DINITRATE 10 MG: 10 TABLET ORAL at 06:01

## 2020-01-19 RX ADMIN — CETIRIZINE HYDROCHLORIDE 10 MG: 10 TABLET, FILM COATED ORAL at 08:01

## 2020-01-19 RX ADMIN — TRIAMCINOLONE ACETONIDE: 5 CREAM TOPICAL at 08:01

## 2020-01-19 RX ADMIN — TRIAMCINOLONE ACETONIDE: 5 CREAM TOPICAL at 10:01

## 2020-01-19 RX ADMIN — POLYETHYLENE GLYCOL 3350 17 G: 17 POWDER, FOR SOLUTION ORAL at 08:01

## 2020-01-19 RX ADMIN — DOBUTAMINE IN DEXTROSE 5 MCG/KG/MIN: 200 INJECTION, SOLUTION INTRAVENOUS at 04:01

## 2020-01-19 RX ADMIN — Medication 6 MG: at 08:01

## 2020-01-19 RX ADMIN — FUROSEMIDE 30 MG/HR: 10 INJECTION, SOLUTION INTRAMUSCULAR; INTRAVENOUS at 04:01

## 2020-01-19 RX ADMIN — ISOSORBIDE DINITRATE 10 MG: 10 TABLET ORAL at 02:01

## 2020-01-19 RX ADMIN — STANDARDIZED SENNA CONCENTRATE AND DOCUSATE SODIUM 2 TABLET: 8.6; 5 TABLET ORAL at 08:01

## 2020-01-19 RX ADMIN — HEPARIN SODIUM AND DEXTROSE 15.94 UNITS/KG/HR: 10000; 5 INJECTION INTRAVENOUS at 01:01

## 2020-01-19 RX ADMIN — FAMOTIDINE 20 MG: 20 TABLET ORAL at 08:01

## 2020-01-19 RX ADMIN — HYDRALAZINE HYDROCHLORIDE 25 MG: 25 TABLET, FILM COATED ORAL at 06:01

## 2020-01-19 RX ADMIN — ACETAMINOPHEN 1000 MG: 500 TABLET ORAL at 02:01

## 2020-01-19 RX ADMIN — HYDRALAZINE HYDROCHLORIDE 25 MG: 25 TABLET, FILM COATED ORAL at 02:01

## 2020-01-19 RX ADMIN — DOBUTAMINE IN DEXTROSE 5 MCG/KG/MIN: 200 INJECTION, SOLUTION INTRAVENOUS at 08:01

## 2020-01-19 NOTE — ASSESSMENT & PLAN NOTE
-NICM; Likely Etoh induced  -Last 2D Echo 1/16/20: LVEF 20%, LVEDD 6.92 cm   -Transferred from [facility] with IABP at 1:1 for further level of care  -Continued IABP and ; diuresing well on Lasix at 30 mg/hr and creatinine has trended down to 1.6  -Hypervolemic on examination today  -CVP: 19, SVO2: 73. Will continue lasix infusion at current dose and consider diuril if UOP drops.  Continue IABP,  at 5 mcg/kg/min and Radha at 10 ppm  -GDMT: Increase Isordil and Hydralazine today.  Patient was on Entresto prior to admission  -Patient is not currently being evaluated for OHTx/VAD; currently too ill given ANJELICA. Will likely need workup soon if renal recovery.   -2g Na dietary restriction, 1500 mL fluid restriction, strict I/Os

## 2020-01-19 NOTE — PLAN OF CARE
CMICU DAILY GOALS       A: Awake    RASS: Goal - RASS Goal: 0-->alert and calm  Actual - RASS (Mcmillan Agitation-Sedation Scale): 0-->alert and calm   Restraint necessity: Clinical Justification: Removing medical devices  B: Breath   SBT: Not intubated   C: Coordinate A & B, analgesics/sedatives   Pain: managed    SAT: Not intubated  D: Delirium   CAM-ICU: Overall CAM-ICU: Negative  E: Early Mobility   MOVE Screen: Fail   Activity: Activity Management: bedrest maintained per order  FAS: Feeding/Nutrition   Diet order: Diet/Nutrition Received: restrict fluids, low saturated fat/low cholesterol,   Fluid restriction: Fluid Requirement: 1500 cc fr  T: Thrombus   DVT prophylaxis: VTE Required Core Measure: Pharmacological prophylaxis initiated/maintained  H: HOB Elevation   Head of Bed (HOB): HOB at 15 degrees  U: Ulcer Prophylaxis   GI: yes  G: Glucose control   managed    S: Skin   Bundle compliance: yes   Bathing/Skin Care: bath, partial, bath, chlorhexidine Date: [unfilled] 1/18/20  B: Bowel Function   no issues   I: Indwelling Catheters   Mc necessity:      Urethral Catheter 01/15/20 2030 Straight-tip 16 Fr.-Reason for Continuing Urinary Catheterization: Critically ill in ICU requiring intensive monitoring   CVC necessity: Yes   IPAD offered: No  D: De-escalation Antibx   Yes  Plan for the day   Continue monitor all vitals, svo2, cvp and continue with diuretics  Family/Goals of care/Code Status   Code Status: Full Code     VS and assessment per flow sheet, patient progressing towards goals as tolerated, plan of care reviewed with Saba Lott and family, all concerns addressed, will continue to monitor.

## 2020-01-19 NOTE — PLAN OF CARE
No acute events today. IABP to right groin, no issues. Augmentation 100-120s. Lasix, heparin,  remain. Afebrile. NSR. Bath administered. 1 BM. Mc in place, 200-300 cc/hr. Will replace electrolytes as needed.     CMICU DAILY GOALS       A: Awake    RASS: Goal - RASS Goal: 0-->alert and calm  Actual - RASS (Mcmillan Agitation-Sedation Scale): 0-->alert and calm   Restraint necessity: Clinical Justification: Removing medical devices  B: Breath   SBT: Not intubated   C: Coordinate A & B, analgesics/sedatives   Pain: managed    SAT: Not intubated  D: Delirium   CAM-ICU: Overall CAM-ICU: Negative  E: Early Mobility   MOVE Screen: Fail   Activity: Activity Management: bedrest maintained per order  FAS: Feeding/Nutrition   Diet order: Diet/Nutrition Received: low saturated fat/low cholesterol,   Fluid restriction: Fluid Requirement: 1500 CC FR  T: Thrombus   DVT prophylaxis: VTE Required Core Measure: Pharmacological prophylaxis initiated/maintained  H: HOB Elevation   Head of Bed (HOB): HOB at 15 degrees  U: Ulcer Prophylaxis   GI: yes  G: Glucose control   managed    S: Skin   Bundle compliance: yes   Bathing/Skin Care: dressed/undressed, bath, chlorhexidine, bath, complete, incontinence care, linen changed Date:1/18/19  B: Bowel Function   no issues   I: Indwelling Catheters   Mc necessity:      Urethral Catheter 01/15/20 2030 Straight-tip 16 Fr.-Reason for Continuing Urinary Catheterization: Critically ill in ICU requiring intensive monitoring   CVC necessity: Yes   IPAD offered: Not appropriate  D: De-escalation Antibx   Yes  Plan for the day   Diurese.   Family/Goals of care/Code Status   Code Status: Full Code     No acute events throughout day, VS and assessment per flow sheet, patient progressing towards goals as tolerated, plan of care reviewed with Saba Lott and family, all concerns addressed, will continue to monitor.

## 2020-01-19 NOTE — SUBJECTIVE & OBJECTIVE
**Interval History: Net -ve > 3.7L past 24 hours on Lasix at 30 mg/hr, but CVP remains 19. Does have a little less BETO. Creatinine has trended down to 1.6. No complaints or overnight events.    Continuous Infusions:   DOBUTamine 5 mcg/kg/min (01/19/20 0705)    furosemide (LASIX) 2 mg/mL infusion (non-titrating) 30 mg/hr (01/19/20 0705)    heparin (porcine) in D5W 15.944 Units/kg/hr (01/19/20 0705)    nitric oxide gas       Scheduled Meds:   acetaminophen  1,000 mg Oral Q8H    atorvastatin  40 mg Oral Daily    cetirizine  10 mg Oral Daily    famotidine  20 mg Oral Daily    hydrALAZINE  25 mg Oral Q8H    isosorbide dinitrate  10 mg Oral Q8H    magnesium sulfate IVPB  1 g Intravenous Once    melatonin  6 mg Oral Nightly    polyethylene glycol  17 g Oral Daily    senna-docusate 8.6-50 mg  2 tablet Oral BID    triamcinolone acetonide 0.5%   Topical (Top) TID     PRN Meds:albuterol, bisacodyl, heparin (PORCINE), heparin (PORCINE), hydrOXYzine HCl, sodium chloride 0.9%, traMADol    Review of patient's allergies indicates:   Allergen Reactions    Iodine and iodide containing products      Objective:     Vital Signs (Most Recent):  Temp: 98.5 °F (36.9 °C) (01/19/20 0300)  Pulse: 95 (01/19/20 0705)  Resp: (!) 31 (01/19/20 0705)  BP: (!) 141/79 (01/18/20 2130)  SpO2: 99 % (01/19/20 0705) Vital Signs (24h Range):  Temp:  [97.8 °F (36.6 °C)-98.8 °F (37.1 °C)] 98.5 °F (36.9 °C)  Pulse:  [] 95  Resp:  [15-42] 31  SpO2:  [93 %-100 %] 99 %  BP: (111-141)/(76-82) 141/79     Patient Vitals for the past 72 hrs (Last 3 readings):   Weight   01/19/20 0300 118.3 kg (260 lb 12.9 oz)   01/18/20 0632 118.3 kg (260 lb 12.9 oz)     Body mass index is 40.85 kg/m².      Intake/Output Summary (Last 24 hours) at 1/19/2020 0748  Last data filed at 1/19/2020 0715  Gross per 24 hour   Intake 2561.81 ml   Output 6295 ml   Net -3733.19 ml       Hemodynamic Parameters:       Telemetry: ST with PVC's    Physical Exam    Constitutional: He is oriented to person, place, and time. He appears well-developed and well-nourished.   HENT:   Head: Normocephalic and atraumatic.   Eyes: Pupils are equal, round, and reactive to light. Conjunctivae and EOM are normal.   Neck: Normal range of motion. Neck supple. No thyromegaly present.   RIJ line   Cardiovascular: Normal rate and regular rhythm.   + S3   Pulmonary/Chest: Effort normal and breath sounds normal.   Abdominal: Soft. Bowel sounds are normal.   Musculoskeletal:   3+ edema from top of thighs down, but no further scrotal edema   Neurological: He is alert and oriented to person, place, and time.   Skin: Skin is warm and dry. Capillary refill takes 2 to 3 seconds.   Psychiatric: He has a normal mood and affect. His behavior is normal. Judgment and thought content normal.       Significant Labs:  CBC:  Recent Labs   Lab 01/17/20  0424 01/18/20  0334 01/19/20  0300   WBC 9.15 8.94 9.54   RBC 4.29* 4.20* 4.23*   HGB 11.7* 11.2* 11.3*   HCT 36.8* 35.9* 35.8*    164 138*   MCV 86 86 85   MCH 27.3 26.7* 26.7*   MCHC 31.8* 31.2* 31.6*     BNP:  Recent Labs   Lab 01/15/20  2001 01/16/20  1106 01/17/20  0424   BNP 3,525* 2,534* 1,851*     CMP:  Recent Labs   Lab 01/18/20  0334 01/18/20  1714 01/19/20  0300   GLU 88  88 104 92  92  92   CALCIUM 8.6*  8.6* 8.4* 8.7  8.7  8.7   ALBUMIN 2.9* 2.9* 3.1*  3.1*   PROT 6.5 6.4 6.8  6.8     139 139 140  140  140   K 3.7  3.7 3.6 3.5  3.5  3.5   CO2 32*  32* 34* 33*  33*  33*   CL 97  97 97 95  95  95   BUN 25*  25* 21* 20  20  20   CREATININE 1.9*  1.9* 1.8* 1.6*  1.6*  1.6*   ALKPHOS 127 122 121  121   ALT 16 15 15  15   AST 27 26 27  27   BILITOT 1.3* 1.1* 1.3*  1.3*      Coagulation:   Recent Labs   Lab 01/15/20  2001  01/17/20  2317 01/18/20  0334 01/19/20  0300   INR 2.0*  --   --   --   --    APTT 26.1   < > 49.0* 52.8* 50.8*    < > = values in this interval not displayed.     LDH:  No results for  input(s): LDH in the last 72 hours.  Microbiology:  Microbiology Results (last 7 days)     ** No results found for the last 168 hours. **          I have reviewed all pertinent labs within the past 24 hours.    Estimated Creatinine Clearance: 64.2 mL/min (A) (based on SCr of 1.6 mg/dL (H)).    Diagnostic Results:  I have reviewed all pertinent imaging results/findings within the past 24 hours.

## 2020-01-19 NOTE — NURSING
Pt continues to take NC off. Pt educated on purpose for nitric and the benefits and importance of leaving NC in place.     Immobilizer taken off this AM for comfort, pt bending leg and not following laying flat at this time. Immobilizer placed back on pt and pt re educated.

## 2020-01-19 NOTE — NURSING
Patient removed picc dressing and iabp dressing and touched sited with bare fingers. Chloroprep sterile scrub and dressing change done as well educating multiple times about the infection risks. BRADEN.

## 2020-01-20 LAB
ALBUMIN SERPL BCP-MCNC: 2.9 G/DL (ref 3.5–5.2)
ALBUMIN SERPL BCP-MCNC: 3.2 G/DL (ref 3.5–5.2)
ALLENS TEST: ABNORMAL
ALP SERPL-CCNC: 106 U/L (ref 55–135)
ALP SERPL-CCNC: 117 U/L (ref 55–135)
ALT SERPL W/O P-5'-P-CCNC: 15 U/L (ref 10–44)
ALT SERPL W/O P-5'-P-CCNC: 16 U/L (ref 10–44)
ANION GAP SERPL CALC-SCNC: 11 MMOL/L (ref 8–16)
ANION GAP SERPL CALC-SCNC: 9 MMOL/L (ref 8–16)
ANION GAP SERPL CALC-SCNC: 9 MMOL/L (ref 8–16)
APTT BLDCRRT: 59.2 SEC (ref 21–32)
AST SERPL-CCNC: 26 U/L (ref 10–40)
AST SERPL-CCNC: 27 U/L (ref 10–40)
BASOPHILS # BLD AUTO: 0.03 K/UL (ref 0–0.2)
BASOPHILS NFR BLD: 0.3 % (ref 0–1.9)
BILIRUB SERPL-MCNC: 1.1 MG/DL (ref 0.1–1)
BILIRUB SERPL-MCNC: 1.2 MG/DL (ref 0.1–1)
BUN SERPL-MCNC: 17 MG/DL (ref 6–20)
BUN SERPL-MCNC: 19 MG/DL (ref 6–20)
BUN SERPL-MCNC: 19 MG/DL (ref 6–20)
CALCIUM SERPL-MCNC: 8.8 MG/DL (ref 8.7–10.5)
CHLORIDE SERPL-SCNC: 93 MMOL/L (ref 95–110)
CO2 SERPL-SCNC: 30 MMOL/L (ref 23–29)
CO2 SERPL-SCNC: 34 MMOL/L (ref 23–29)
CO2 SERPL-SCNC: 34 MMOL/L (ref 23–29)
CREAT SERPL-MCNC: 1.5 MG/DL (ref 0.5–1.4)
DELSYS: ABNORMAL
DIFFERENTIAL METHOD: ABNORMAL
EOSINOPHIL # BLD AUTO: 0.2 K/UL (ref 0–0.5)
EOSINOPHIL NFR BLD: 1.5 % (ref 0–8)
ERYTHROCYTE [DISTWIDTH] IN BLOOD BY AUTOMATED COUNT: 17.5 % (ref 11.5–14.5)
ERYTHROCYTE [SEDIMENTATION RATE] IN BLOOD BY WESTERGREN METHOD: 19 MM/H
ERYTHROCYTE [SEDIMENTATION RATE] IN BLOOD BY WESTERGREN METHOD: 28 MM/H
EST. GFR  (AFRICAN AMERICAN): 59.7 ML/MIN/1.73 M^2
EST. GFR  (NON AFRICAN AMERICAN): 51.7 ML/MIN/1.73 M^2
FLOW: 4
GLUCOSE SERPL-MCNC: 165 MG/DL (ref 70–110)
GLUCOSE SERPL-MCNC: 98 MG/DL (ref 70–110)
GLUCOSE SERPL-MCNC: 98 MG/DL (ref 70–110)
HCO3 UR-SCNC: 36.5 MMOL/L (ref 24–28)
HCO3 UR-SCNC: 38.3 MMOL/L (ref 24–28)
HCO3 UR-SCNC: 38.9 MMOL/L (ref 24–28)
HCT VFR BLD AUTO: 35.4 % (ref 40–54)
HGB BLD-MCNC: 11.3 G/DL (ref 14–18)
IMM GRANULOCYTES # BLD AUTO: 0.03 K/UL (ref 0–0.04)
IMM GRANULOCYTES NFR BLD AUTO: 0.3 % (ref 0–0.5)
LYMPHOCYTES # BLD AUTO: 1.4 K/UL (ref 1–4.8)
LYMPHOCYTES NFR BLD: 14 % (ref 18–48)
MAGNESIUM SERPL-MCNC: 2.2 MG/DL (ref 1.6–2.6)
MAGNESIUM SERPL-MCNC: 3.3 MG/DL (ref 1.6–2.6)
MCH RBC QN AUTO: 27.1 PG (ref 27–31)
MCHC RBC AUTO-ENTMCNC: 31.9 G/DL (ref 32–36)
MCV RBC AUTO: 85 FL (ref 82–98)
MODE: ABNORMAL
MONOCYTES # BLD AUTO: 0.9 K/UL (ref 0.3–1)
MONOCYTES NFR BLD: 8.9 % (ref 4–15)
NEUTROPHILS # BLD AUTO: 7.5 K/UL (ref 1.8–7.7)
NEUTROPHILS NFR BLD: 75 % (ref 38–73)
NRBC BLD-RTO: 0 /100 WBC
PCO2 BLDA: 50.7 MMHG (ref 35–45)
PCO2 BLDA: 56.5 MMHG (ref 35–45)
PCO2 BLDA: 57.1 MMHG (ref 35–45)
PH SMN: 7.42 [PH] (ref 7.35–7.45)
PH SMN: 7.44 [PH] (ref 7.35–7.45)
PH SMN: 7.49 [PH] (ref 7.35–7.45)
PHOSPHATE SERPL-MCNC: 3.9 MG/DL (ref 2.7–4.5)
PLATELET # BLD AUTO: 128 K/UL (ref 150–350)
PMV BLD AUTO: 9.4 FL (ref 9.2–12.9)
PO2 BLDA: 27 MMHG (ref 40–60)
PO2 BLDA: 38 MMHG (ref 40–60)
PO2 BLDA: 39 MMHG (ref 40–60)
POC BE: 12 MMOL/L
POC BE: 15 MMOL/L
POC BE: 15 MMOL/L
POC SATURATED O2: 50 % (ref 95–100)
POC SATURATED O2: 74 % (ref 95–100)
POC SATURATED O2: 76 % (ref 95–100)
POC TCO2: 38 MMOL/L (ref 24–29)
POC TCO2: 40 MMOL/L (ref 24–29)
POC TCO2: 41 MMOL/L (ref 24–29)
POTASSIUM SERPL-SCNC: 3.9 MMOL/L (ref 3.5–5.1)
PROT SERPL-MCNC: 6.9 G/DL (ref 6–8.4)
PROT SERPL-MCNC: 7.1 G/DL (ref 6–8.4)
RBC # BLD AUTO: 4.17 M/UL (ref 4.6–6.2)
SAMPLE: ABNORMAL
SITE: ABNORMAL
SODIUM SERPL-SCNC: 134 MMOL/L (ref 136–145)
SODIUM SERPL-SCNC: 136 MMOL/L (ref 136–145)
SODIUM SERPL-SCNC: 136 MMOL/L (ref 136–145)
SP02: 92
SP02: 94
WBC # BLD AUTO: 9.96 K/UL (ref 3.9–12.7)

## 2020-01-20 PROCEDURE — 94761 N-INVAS EAR/PLS OXIMETRY MLT: CPT

## 2020-01-20 PROCEDURE — 99900035 HC TECH TIME PER 15 MIN (STAT)

## 2020-01-20 PROCEDURE — 63600175 PHARM REV CODE 636 W HCPCS: Performed by: PHYSICIAN ASSISTANT

## 2020-01-20 PROCEDURE — 63600367 HC NITRIC OXIDE PER HOUR

## 2020-01-20 PROCEDURE — 36415 COLL VENOUS BLD VENIPUNCTURE: CPT

## 2020-01-20 PROCEDURE — 20000000 HC ICU ROOM

## 2020-01-20 PROCEDURE — 83735 ASSAY OF MAGNESIUM: CPT

## 2020-01-20 PROCEDURE — 25000003 PHARM REV CODE 250: Performed by: PHYSICIAN ASSISTANT

## 2020-01-20 PROCEDURE — 25000003 PHARM REV CODE 250: Performed by: INTERNAL MEDICINE

## 2020-01-20 PROCEDURE — 92523 SPEECH SOUND LANG COMPREHEN: CPT

## 2020-01-20 PROCEDURE — 25000003 PHARM REV CODE 250: Performed by: NURSE PRACTITIONER

## 2020-01-20 PROCEDURE — 82803 BLOOD GASES ANY COMBINATION: CPT

## 2020-01-20 PROCEDURE — 27000221 HC OXYGEN, UP TO 24 HOURS

## 2020-01-20 PROCEDURE — 63600175 PHARM REV CODE 636 W HCPCS: Performed by: INTERNAL MEDICINE

## 2020-01-20 PROCEDURE — 84100 ASSAY OF PHOSPHORUS: CPT

## 2020-01-20 PROCEDURE — 27000202 HC IABP, ADD'L DAY

## 2020-01-20 PROCEDURE — 63600175 PHARM REV CODE 636 W HCPCS: Performed by: HOSPITALIST

## 2020-01-20 PROCEDURE — 85730 THROMBOPLASTIN TIME PARTIAL: CPT

## 2020-01-20 PROCEDURE — 63600175 PHARM REV CODE 636 W HCPCS: Performed by: STUDENT IN AN ORGANIZED HEALTH CARE EDUCATION/TRAINING PROGRAM

## 2020-01-20 PROCEDURE — 80053 COMPREHEN METABOLIC PANEL: CPT | Mod: 91

## 2020-01-20 PROCEDURE — 99291 PR CRITICAL CARE, E/M 30-74 MINUTES: ICD-10-PCS | Mod: ,,, | Performed by: INTERNAL MEDICINE

## 2020-01-20 PROCEDURE — 85025 COMPLETE CBC W/AUTO DIFF WBC: CPT

## 2020-01-20 PROCEDURE — 83735 ASSAY OF MAGNESIUM: CPT | Mod: 91

## 2020-01-20 PROCEDURE — 25000003 PHARM REV CODE 250: Performed by: STUDENT IN AN ORGANIZED HEALTH CARE EDUCATION/TRAINING PROGRAM

## 2020-01-20 PROCEDURE — 25000003 PHARM REV CODE 250: Performed by: HOSPITALIST

## 2020-01-20 PROCEDURE — 99291 CRITICAL CARE FIRST HOUR: CPT | Mod: ,,, | Performed by: INTERNAL MEDICINE

## 2020-01-20 RX ORDER — FUROSEMIDE 10 MG/ML
80 INJECTION INTRAMUSCULAR; INTRAVENOUS ONCE
Status: COMPLETED | OUTPATIENT
Start: 2020-01-20 | End: 2020-01-20

## 2020-01-20 RX ORDER — POTASSIUM CHLORIDE 20 MEQ/1
40 TABLET, EXTENDED RELEASE ORAL ONCE
Status: COMPLETED | OUTPATIENT
Start: 2020-01-20 | End: 2020-01-20

## 2020-01-20 RX ORDER — METOLAZONE 2.5 MG/1
10 TABLET ORAL ONCE
Status: COMPLETED | OUTPATIENT
Start: 2020-01-20 | End: 2020-01-20

## 2020-01-20 RX ORDER — HYDRALAZINE HYDROCHLORIDE 50 MG/1
50 TABLET, FILM COATED ORAL EVERY 8 HOURS
Status: COMPLETED | OUTPATIENT
Start: 2020-01-20 | End: 2020-02-01

## 2020-01-20 RX ORDER — HYDRALAZINE HYDROCHLORIDE 25 MG/1
25 TABLET, FILM COATED ORAL ONCE
Status: COMPLETED | OUTPATIENT
Start: 2020-01-20 | End: 2020-01-20

## 2020-01-20 RX ADMIN — Medication 6 MG: at 09:01

## 2020-01-20 RX ADMIN — FUROSEMIDE 40 MG/HR: 10 INJECTION, SOLUTION INTRAMUSCULAR; INTRAVENOUS at 09:01

## 2020-01-20 RX ADMIN — HYDRALAZINE HYDROCHLORIDE 25 MG: 25 TABLET, FILM COATED ORAL at 01:01

## 2020-01-20 RX ADMIN — ATORVASTATIN CALCIUM 40 MG: 20 TABLET, FILM COATED ORAL at 08:01

## 2020-01-20 RX ADMIN — ACETAMINOPHEN 1000 MG: 500 TABLET ORAL at 06:01

## 2020-01-20 RX ADMIN — TRIAMCINOLONE ACETONIDE: 5 CREAM TOPICAL at 08:01

## 2020-01-20 RX ADMIN — METOLAZONE 10 MG: 2.5 TABLET ORAL at 09:01

## 2020-01-20 RX ADMIN — FUROSEMIDE 30 MG/HR: 10 INJECTION, SOLUTION INTRAMUSCULAR; INTRAVENOUS at 05:01

## 2020-01-20 RX ADMIN — ISOSORBIDE DINITRATE 10 MG: 10 TABLET ORAL at 09:01

## 2020-01-20 RX ADMIN — TRIAMCINOLONE ACETONIDE: 5 CREAM TOPICAL at 09:01

## 2020-01-20 RX ADMIN — STANDARDIZED SENNA CONCENTRATE AND DOCUSATE SODIUM 2 TABLET: 8.6; 5 TABLET ORAL at 09:01

## 2020-01-20 RX ADMIN — POTASSIUM CHLORIDE 40 MEQ: 1500 TABLET, EXTENDED RELEASE ORAL at 06:01

## 2020-01-20 RX ADMIN — ISOSORBIDE DINITRATE 10 MG: 10 TABLET ORAL at 01:01

## 2020-01-20 RX ADMIN — HEPARIN SODIUM AND DEXTROSE 16 UNITS/KG/HR: 10000; 5 INJECTION INTRAVENOUS at 05:01

## 2020-01-20 RX ADMIN — MAGNESIUM SULFATE IN WATER 2 G: 40 INJECTION, SOLUTION INTRAVENOUS at 12:01

## 2020-01-20 RX ADMIN — CETIRIZINE HYDROCHLORIDE 10 MG: 10 TABLET, FILM COATED ORAL at 08:01

## 2020-01-20 RX ADMIN — HYDRALAZINE HYDROCHLORIDE 25 MG: 25 TABLET, FILM COATED ORAL at 05:01

## 2020-01-20 RX ADMIN — DOBUTAMINE IN DEXTROSE 5 MCG/KG/MIN: 200 INJECTION, SOLUTION INTRAVENOUS at 05:01

## 2020-01-20 RX ADMIN — ISOSORBIDE DINITRATE 10 MG: 10 TABLET ORAL at 05:01

## 2020-01-20 RX ADMIN — TRAMADOL HYDROCHLORIDE 50 MG: 50 TABLET, FILM COATED ORAL at 06:01

## 2020-01-20 RX ADMIN — FAMOTIDINE 20 MG: 20 TABLET ORAL at 08:01

## 2020-01-20 RX ADMIN — CHLOROTHIAZIDE SODIUM 250 MG: 500 INJECTION, POWDER, LYOPHILIZED, FOR SOLUTION INTRAVENOUS at 06:01

## 2020-01-20 RX ADMIN — TRIAMCINOLONE ACETONIDE: 5 CREAM TOPICAL at 02:01

## 2020-01-20 RX ADMIN — FUROSEMIDE 80 MG: 10 INJECTION, SOLUTION INTRAMUSCULAR; INTRAVENOUS at 10:01

## 2020-01-20 RX ADMIN — HYDRALAZINE HYDROCHLORIDE 50 MG: 50 TABLET ORAL at 09:01

## 2020-01-20 RX ADMIN — POLYETHYLENE GLYCOL 3350 17 G: 17 POWDER, FOR SOLUTION ORAL at 08:01

## 2020-01-20 RX ADMIN — HYDRALAZINE HYDROCHLORIDE 25 MG: 25 TABLET, FILM COATED ORAL at 06:01

## 2020-01-20 RX ADMIN — ACETAMINOPHEN 1000 MG: 500 TABLET ORAL at 01:01

## 2020-01-20 RX ADMIN — STANDARDIZED SENNA CONCENTRATE AND DOCUSATE SODIUM 2 TABLET: 8.6; 5 TABLET ORAL at 08:01

## 2020-01-20 NOTE — ASSESSMENT & PLAN NOTE
-NICM; Likely Etoh induced  -Last 2D Echo 1/16/20: LVEF 20%, LVEDD 6.92 cm   -Transferred from [facility] with IABP at 1:1 for further level of care  - CVP 18, SVO2 76, CO/CI 12/5.2 while on Dobutamine @5, Radha 10 and IABP 1:1  - On lasix 30 mg/hr, Will increase to 40mg/hr and add metal ozone 10 mg. Increase hydralazine to 50 mg TID. Change IABP to 1:2 and switch him back to 1:1 tonight. Plan to wean him off balloon by wednesday    - DC central line.

## 2020-01-20 NOTE — NURSING
Pt UOP 1.6L this shift. Dr. Kamara at bedside. CVP 24& SvO2 50. 250 of Diuril ordered. IABP back to 1:1.

## 2020-01-20 NOTE — PROGRESS NOTES
01/19/2020  Abelardo Sepulveda    Current provider:  Lian Daniel MD      I, Abelardo Sepulveda, rounded on Saba Lott to ensure all mechanical assist device settings (IABP or VAD) were appropriate and all parameters were within limits.  I was able to ensure all back up equipment was present, the staff had no issues, and the Perfusion Department daily rounding was complete.    9:47 PM
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
## 2020-01-20 NOTE — SUBJECTIVE & OBJECTIVE
Interval History: No events overnight. SBP unassisted above 120s. Had BM. No fevers noticed.    Continuous Infusions:   DOBUTamine 5 mcg/kg/min (01/20/20 0900)    furosemide (LASIX) 10 mg/mL infusion (non-titrating) 40 mg/hr (01/20/20 0959)    heparin (porcine) in D5W 16 Units/kg/hr (01/20/20 0900)    nitric oxide gas       Scheduled Meds:   acetaminophen  1,000 mg Oral Q8H    atorvastatin  40 mg Oral Daily    cetirizine  10 mg Oral Daily    famotidine  20 mg Oral Daily    hydrALAZINE  25 mg Oral Q8H    isosorbide dinitrate  10 mg Oral Q8H    magnesium sulfate IVPB  1 g Intravenous Once    melatonin  6 mg Oral Nightly    polyethylene glycol  17 g Oral Daily    senna-docusate 8.6-50 mg  2 tablet Oral BID    triamcinolone acetonide 0.5%   Topical (Top) TID     PRN Meds:albuterol, bisacodyl, heparin (PORCINE), heparin (PORCINE), hydrOXYzine HCl, sodium chloride 0.9%, traMADol    Review of patient's allergies indicates:   Allergen Reactions    Iodine and iodide containing products      Objective:     Vital Signs (Most Recent):  Temp: 98 °F (36.7 °C) (01/20/20 0701)  Pulse: (!) 111 (01/20/20 0900)  Resp: (!) 26 (01/20/20 0900)  BP: (!) 141/75 (01/20/20 0701)  SpO2: (!) 93 % (01/20/20 0900) Vital Signs (24h Range):  Temp:  [98 °F (36.7 °C)-98.8 °F (37.1 °C)] 98 °F (36.7 °C)  Pulse:  [] 111  Resp:  [13-32] 26  SpO2:  [91 %-100 %] 93 %  BP: (118-141)/(61-80) 141/75     Patient Vitals for the past 72 hrs (Last 3 readings):   Weight   01/20/20 0701 108 kg (238 lb 1.6 oz)   01/19/20 0300 118.3 kg (260 lb 12.9 oz)   01/18/20 0632 118.3 kg (260 lb 12.9 oz)     Body mass index is 37.29 kg/m².      Intake/Output Summary (Last 24 hours) at 1/20/2020 1053  Last data filed at 1/20/2020 0900  Gross per 24 hour   Intake 2341.11 ml   Output 5330 ml   Net -2988.89 ml       Hemodynamic Parameters:       Telemetry: NSR    Physical Exam  Constitutional: He is oriented to person, place, and time. He appears  well-developed and well-nourished.   HENT:   Head: Normocephalic and atraumatic.   Eyes: Pupils are equal, round, and reactive to light. Conjunctivae and EOM are normal.   Neck: Normal range of motion. Neck supple. No thyromegaly present.   RIJ line   Cardiovascular: Normal rate and regular rhythm.   + S3   Pulmonary/Chest: Effort normal and breath sounds normal.   Abdominal: Soft. Bowel sounds are normal.   Musculoskeletal:   3+ edema from top of thighs down, but no further scrotal edema   Neurological: He is alert and oriented to person, place, and time.   Skin: Skin is warm and dry. Capillary refill takes 2 to 3 seconds.   Psychiatric: He has a normal mood and affect. His behavior is normal. Judgment and thought content normal  Significant Labs:  CBC:  Recent Labs   Lab 01/18/20  0334 01/19/20  0300 01/20/20  0235   WBC 8.94 9.54 9.96   RBC 4.20* 4.23* 4.17*   HGB 11.2* 11.3* 11.3*   HCT 35.9* 35.8* 35.4*    138* 128*   MCV 86 85 85   MCH 26.7* 26.7* 27.1   MCHC 31.2* 31.6* 31.9*     BNP:  Recent Labs   Lab 01/15/20  2001 01/16/20  1106 01/17/20  0424   BNP 3,525* 2,534* 1,851*     CMP:  Recent Labs   Lab 01/18/20  1714 01/19/20  0300 01/19/20  1835 01/20/20  0235    92  92  92  --  98  98   CALCIUM 8.4* 8.7  8.7  8.7  --  8.8  8.8   ALBUMIN 2.9* 3.1*  3.1*  --  3.2*   PROT 6.4 6.8  6.8  --  7.1    140  140  140  --  136  136   K 3.6 3.5  3.5  3.5 3.7 3.9  3.9   CO2 34* 33*  33*  33*  --  34*  34*   CL 97 95  95  95  --  93*  93*   BUN 21* 20  20  20  --  19  19   CREATININE 1.8* 1.6*  1.6*  1.6*  --  1.5*  1.5*   ALKPHOS 122 121  121  --  117   ALT 15 15  15  --  15   AST 26 27  27  --  26   BILITOT 1.1* 1.3*  1.3*  --  1.2*      Coagulation:   Recent Labs   Lab 01/15/20  2001  01/18/20  0334 01/19/20  0300 01/20/20  0235   INR 2.0*  --   --   --   --    APTT 26.1   < > 52.8* 50.8* 59.2*    < > = values in this interval not displayed.     LDH:  No results for  input(s): LDH in the last 72 hours.  Microbiology:  Microbiology Results (last 7 days)     ** No results found for the last 168 hours. **          I have reviewed all pertinent labs within the past 24 hours.    Estimated Creatinine Clearance: 65.2 mL/min (A) (based on SCr of 1.5 mg/dL (H)).    Diagnostic Results:  I have reviewed and interpreted all pertinent imaging results/findings within the past 24 hours.

## 2020-01-20 NOTE — PROGRESS NOTES
01/20/2020  Miko Rivera    Current provider:  Lian Daniel MD      I, Miko Rivera, rounded on Saba Lott to ensure all mechanical assist device settings (IABP or VAD) were appropriate and all parameters were within limits.  I was able to ensure all back up equipment was present, the staff had no issues, and the Perfusion Department daily rounding was complete.    7:13 AM

## 2020-01-20 NOTE — PLAN OF CARE
CMICU DAILY GOALS       A: Awake    RASS: Goal - RASS Goal: 0-->alert and calm  Actual - RASS (Mcmillan Agitation-Sedation Scale): 0-->alert and calm   Restraint necessity: Clinical Justification: Removing medical devices  B: Breath   SBT: Not intubated   C: Coordinate A & B, analgesics/sedatives   Pain: managed    SAT: Not intubated  D: Delirium   CAM-ICU: Overall CAM-ICU: Negative  E: Early Mobility   MOVE Screen: Fail   Activity: Activity Management: bedrest maintained per order  FAS: Feeding/Nutrition   Diet order: Diet/Nutrition Received: 2 gram sodium, low saturated fat/low cholesterol, restrict fluids,   Fluid restriction: Fluid Requirement: 1500 cc fr  T: Thrombus   DVT prophylaxis: VTE Required Core Measure: Pharmacological prophylaxis initiated/maintained  H: HOB Elevation   Head of Bed (HOB): HOB at 15 degrees  U: Ulcer Prophylaxis   GI: yes  G: Glucose control   managed    S: Skin   Bundle compliance: yes   Bathing/Skin Care: bath, chlorhexidine, bath, complete, dressed/undressed, linen changed Date: [unfilled] 1/20/20  B: Bowel Function   no issues   I: Indwelling Catheters   Mc necessity:      Urethral Catheter 01/15/20 2030 Straight-tip 16 Fr.-Reason for Continuing Urinary Catheterization: Critically ill in ICU requiring intensive monitoring   CVC necessity: Yes   IPAD offered: No  D: De-escalation Antibx   Yes  Plan for the day   Monitor fluid status. SLP consult. Possibly wean IABP.  Family/Goals of care/Code Status   Code Status: Full Code     VS and assessment per flow sheet, patient progressing towards goals as tolerated, plan of care reviewed with Saba Lott and family, all concerns addressed, will continue to monitor.

## 2020-01-20 NOTE — PLAN OF CARE
CMICU DAILY GOALS       A: Awake    RASS: Goal - RASS Goal: 0-->alert and calm  Actual - RASS (Mcmillan Agitation-Sedation Scale): 0-->alert and calm   Restraint necessity: Clinical Justification: Removing medical devices  B: Breath   SBT: NA   C: Coordinate A & B, analgesics/sedatives   Pain: managed    SAT: NA  D: Delirium   CAM-ICU: Overall CAM-ICU: Negative  E: Early Mobility   MOVE Screen: Pass   Activity: Activity Management: bedrest maintained per order  FAS: Feeding/Nutrition   Diet order: Diet/Nutrition Received: 2 gram sodium, low saturated fat/low cholesterol,   Fluid restriction: Fluid Requirement: 1500cc FR  T: Thrombus   DVT prophylaxis: VTE Required Core Measure: Pharmacological prophylaxis initiated/maintained  H: HOB Elevation   Head of Bed (HOB): HOB flat  U: Ulcer Prophylaxis   GI: yes  G: Glucose control   managed    S: Skin   Bundle compliance: yes   Bathing/Skin Care: incontinence care Date: 1/20/2020  B: Bowel Function   no issues   I: Indwelling Catheters   Mc necessity:      Urethral Catheter 01/15/20 2030 Straight-tip 16 Fr.-Reason for Continuing Urinary Catheterization: Critically ill in ICU requiring intensive monitoring   CVC necessity: no   IPAD offered: No  D: De-escalation Antibx   No  Plan for the day   Continue Diuresing. Went up on Lasix gtt. IABP 1:2. Plan to pull IABP by Wednesday  Family/Goals of care/Code Status   Code Status: Full Code     CVP 18,22,24. IABP remains 1:2, ECG triggered, Auto. Augmenting 110-130s. Pulses dopplerable.   No acute events throughout day, VS and assessment per flow sheet, patient progressing towards goals as tolerated, plan of care reviewed with Saba Lott and family, all concerns addressed, will continue to monitor.

## 2020-01-20 NOTE — PROGRESS NOTES
[facility]  Heart Transplant  Progress Note    Patient Name: Saba Lott  MRN: [id number]  Admission Date: 1/15/2020  Hospital Length of Stay: 5 days  Attending Physician: Lian Daniel MD  Primary Care Provider: Primary Doctor No  Principal Problem:Cardiogenic shock    Subjective:     Interval History: No events overnight. SBP unassisted above 120s. Had BM. No fevers noticed.    Continuous Infusions:   DOBUTamine 5 mcg/kg/min (01/20/20 0900)    furosemide (LASIX) 10 mg/mL infusion (non-titrating) 40 mg/hr (01/20/20 0959)    heparin (porcine) in D5W 16 Units/kg/hr (01/20/20 0900)    nitric oxide gas       Scheduled Meds:   acetaminophen  1,000 mg Oral Q8H    atorvastatin  40 mg Oral Daily    cetirizine  10 mg Oral Daily    famotidine  20 mg Oral Daily    hydrALAZINE  25 mg Oral Q8H    isosorbide dinitrate  10 mg Oral Q8H    magnesium sulfate IVPB  1 g Intravenous Once    melatonin  6 mg Oral Nightly    polyethylene glycol  17 g Oral Daily    senna-docusate 8.6-50 mg  2 tablet Oral BID    triamcinolone acetonide 0.5%   Topical (Top) TID     PRN Meds:albuterol, bisacodyl, heparin (PORCINE), heparin (PORCINE), hydrOXYzine HCl, sodium chloride 0.9%, traMADol    Review of patient's allergies indicates:   Allergen Reactions    Iodine and iodide containing products      Objective:     Vital Signs (Most Recent):  Temp: 98 °F (36.7 °C) (01/20/20 0701)  Pulse: (!) 111 (01/20/20 0900)  Resp: (!) 26 (01/20/20 0900)  BP: (!) 141/75 (01/20/20 0701)  SpO2: (!) 93 % (01/20/20 0900) Vital Signs (24h Range):  Temp:  [98 °F (36.7 °C)-98.8 °F (37.1 °C)] 98 °F (36.7 °C)  Pulse:  [] 111  Resp:  [13-32] 26  SpO2:  [91 %-100 %] 93 %  BP: (118-141)/(61-80) 141/75     Patient Vitals for the past 72 hrs (Last 3 readings):   Weight   01/20/20 0701 108 kg (238 lb 1.6 oz)   01/19/20 0300 118.3 kg (260 lb 12.9 oz)   01/18/20 0632 118.3 kg (260 lb 12.9 oz)     Body mass index is 37.29  kg/m².      Intake/Output Summary (Last 24 hours) at 1/20/2020 1053  Last data filed at 1/20/2020 0900  Gross per 24 hour   Intake 2341.11 ml   Output 5330 ml   Net -2988.89 ml       Hemodynamic Parameters:       Telemetry: NSR    Physical Exam  Constitutional: He is oriented to person, place, and time. He appears well-developed and well-nourished.   HENT:   Head: Normocephalic and atraumatic.   Eyes: Pupils are equal, round, and reactive to light. Conjunctivae and EOM are normal.   Neck: Normal range of motion. Neck supple. No thyromegaly present.   RIJ line   Cardiovascular: Normal rate and regular rhythm.   + S3   Pulmonary/Chest: Effort normal and breath sounds normal.   Abdominal: Soft. Bowel sounds are normal.   Musculoskeletal:   3+ edema from top of thighs down, but no further scrotal edema   Neurological: He is alert and oriented to person, place, and time.   Skin: Skin is warm and dry. Capillary refill takes 2 to 3 seconds.   Psychiatric: He has a normal mood and affect. His behavior is normal. Judgment and thought content normal  Significant Labs:  CBC:  Recent Labs   Lab 01/18/20  0334 01/19/20  0300 01/20/20  0235   WBC 8.94 9.54 9.96   RBC 4.20* 4.23* 4.17*   HGB 11.2* 11.3* 11.3*   HCT 35.9* 35.8* 35.4*    138* 128*   MCV 86 85 85   MCH 26.7* 26.7* 27.1   MCHC 31.2* 31.6* 31.9*     BNP:  Recent Labs   Lab 01/15/20  2001 01/16/20  1106 01/17/20  0424   BNP 3,525* 2,534* 1,851*     CMP:  Recent Labs   Lab 01/18/20  1714 01/19/20  0300 01/19/20  1835 01/20/20  0235    92  92  92  --  98  98   CALCIUM 8.4* 8.7  8.7  8.7  --  8.8  8.8   ALBUMIN 2.9* 3.1*  3.1*  --  3.2*   PROT 6.4 6.8  6.8  --  7.1    140  140  140  --  136  136   K 3.6 3.5  3.5  3.5 3.7 3.9  3.9   CO2 34* 33*  33*  33*  --  34*  34*   CL 97 95  95  95  --  93*  93*   BUN 21* 20  20  20  --  19  19   CREATININE 1.8* 1.6*  1.6*  1.6*  --  1.5*  1.5*   ALKPHOS 122 121  121  --  117   ALT 15 15   15  --  15   AST 26 27  27  --  26   BILITOT 1.1* 1.3*  1.3*  --  1.2*      Coagulation:   Recent Labs   Lab 01/15/20  2001  01/18/20  0334 01/19/20  0300 01/20/20  0235   INR 2.0*  --   --   --   --    APTT 26.1   < > 52.8* 50.8* 59.2*    < > = values in this interval not displayed.     LDH:  No results for input(s): LDH in the last 72 hours.  Microbiology:  Microbiology Results (last 7 days)     ** No results found for the last 168 hours. **          I have reviewed all pertinent labs within the past 24 hours.    Estimated Creatinine Clearance: 65.2 mL/min (A) (based on SCr of 1.5 mg/dL (H)).    Diagnostic Results:  I have reviewed and interpreted all pertinent imaging results/findings within the past 24 hours.    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 Chronic MARC - Class II-III and mild LE edema.      Hospital Course (synopsis of major diagnoses, care, treatment, and services provided during the course of the hospital stay):  -2/12 admit to CDU for diuresis with IV lasix  -IV abx   -CXR with suspected small left pleural effusion with associated left basilar atelectasis versus evolving airspace disease  -2/13 single chamber ICD implant; IV lasix decreased to BID  -2/16 add dobutamine, hold BB due to hypotension, no ACE-I or ARB due to recent HPI hypotension  -2/17 Lasix changed to PO  -2/18 dobutamine discontinued    Admitted to [facility] on Thursday due to severe SOB for a week with associated orthopnea, MARC, and PND unable to lie flat and found to be in acute diastolic heart failure. States he normally weighs around 219 but up to 254lb on admission tonight. Says he hasn't been the most compliant in terms of fluid restriction and daily weights but has avoided salt. BNP on admission was 2375. BUN/CRT 32/1.4  He was started on IV diuretics but continued to deteriorate. Creatinine continued to increase and reached 4.3 with oliguria. He was started on CRRT for a few days and tolerated well. Renal u/s was negative for obstruction and liver u/s without findings of cirrhosis. All of this was progression of cardiorenal syndrome with liver congestion from severe volume overload. On arrival vitals stable and in no acute distress. He has obvious anasarca up to the chest. He did have uop of 500 at time of arrival also.    TTE 5/28/19  · Moderate left ventricular enlargement.  · Severely decreased left ventricular systolic function. The estimated ejection fraction is 20%  · Global hypokinetic wall motion.  · Grade III (severe) left ventricular diastolic dysfunction consistent with restrictive physiology.  · Severe left atrial enlargement.  · Moderate right ventricular enlargement.  · Low normal right ventricular systolic function.  · Mild right atrial enlargement.  · Moderate mitral regurgitation.  Mild to moderate tricuspid regurgitation    * Cardiogenic shock  -NICM; Likely Etoh induced  -Last 2D Echo 1/16/20: LVEF 20%, LVEDD 6.92 cm   -Transferred from [facility] with IABP at 1:1 for further level of care  - CVP 18, SVO2 76, CO/CI 12/5.2 while on Dobutamine @5, Radha 10 and IABP 1:1  - On lasix 30 mg/hr, Will increase to 40mg/hr and add metal ozone 10 mg. Increase hydralazine to 50 mg TID. Change IABP to 1:2 and switch him back to 1:1 tonight. Plan to wean him off balloon by wednesday    - DC central line.         Pruritus  -triamcinolone cream, atarax, Zyrtec and Famotidine   -improving    ANJELICA (acute kidney injury)  - cr trending down with diuresis     Essential hypertension  -Currently essentially normotensive   -Continue Hyd/Isordil    Deep vein thrombosis (DVT) of right lower extremity  -Unsure of timing but was on eliquis PTA.   -Continue on heparin gtt for now      AICD (automatic cardioverter/defibrillator)  present  -Medtronic placed 2019 for primary prevention        Asad Westbrook MD  Heart Transplant  [facility]

## 2020-01-20 NOTE — PLAN OF CARE
No acute events today. Pt complaining of congestive nose, will not keep oxygen on at this time. 3 BMs today. Mc draining 150-250 cc. CVP 19-20. IABP remains, 1:1 ECG Auto, leads changed. Plan to wean and possibly pull IABP tomorrow. Doppler to lower pulses. Pt oriented but forgetful at times. SLP to see pt tomorrow. POC updated with pt.     CMICU DAILY GOALS       A: Awake    RASS: Goal - RASS Goal: 0-->alert and calm  Actual - RASS (Mcmillan Agitation-Sedation Scale): 0-->alert and calm   Restraint necessity: Clinical Justification: Removing medical devices  B: Breath   SBT: Not intubated   C: Coordinate A & B, analgesics/sedatives   Pain: managed    SAT: Not intubated  D: Delirium   CAM-ICU: Overall CAM-ICU: Negative  E: Early Mobility   MOVE Screen: Fail   Activity: Activity Management: bedrest maintained per order  FAS: Feeding/Nutrition   Diet order: Diet/Nutrition Received: 2 gram sodium, low saturated fat/low cholesterol,   Fluid restriction: Fluid Requirement: 1500 cc FR  T: Thrombus   DVT prophylaxis: VTE Required Core Measure: Pharmacological prophylaxis initiated/maintained  H: HOB Elevation   Head of Bed (HOB): HOB at 15 degrees  U: Ulcer Prophylaxis   GI: yes  G: Glucose control   managed    S: Skin   Bundle compliance: yes   Bathing/Skin Care: bath, chlorhexidine, bath, complete, dressed/undressed, incontinence care, linen changed Date: 1/19/20  B: Bowel Function   no issues   I: Indwelling Catheters   Mc necessity:      Urethral Catheter 01/15/20 2030 Straight-tip 16 Fr.-Reason for Continuing Urinary Catheterization: Critically ill in ICU requiring intensive monitoring   CVC necessity: Yes   IPAD offered: Not appropriate  D: De-escalation Antibx   No  Plan for the day   Diurese.   Family/Goals of care/Code Status   Code Status: Full Code     No acute events throughout day, VS and assessment per flow sheet, patient progressing towards goals as tolerated, plan of care reviewed with Saba Lott  and family, all concerns addressed, will continue to monitor.

## 2020-01-21 ENCOUNTER — EDUCATION (OUTPATIENT)
Dept: TRANSPLANT | Facility: CLINIC | Age: 55
End: 2020-01-21

## 2020-01-21 LAB
ABO + RH BLD: NORMAL
ALBUMIN SERPL BCP-MCNC: 3.3 G/DL (ref 3.5–5.2)
ALBUMIN SERPL BCP-MCNC: 3.3 G/DL (ref 3.5–5.2)
ALBUMIN SERPL BCP-MCNC: 3.4 G/DL (ref 3.5–5.2)
ALLENS TEST: ABNORMAL
ALP SERPL-CCNC: 114 U/L (ref 55–135)
ALP SERPL-CCNC: 117 U/L (ref 55–135)
ALP SERPL-CCNC: 120 U/L (ref 55–135)
ALT SERPL W/O P-5'-P-CCNC: 17 U/L (ref 10–44)
ALT SERPL W/O P-5'-P-CCNC: 17 U/L (ref 10–44)
ALT SERPL W/O P-5'-P-CCNC: 18 U/L (ref 10–44)
ANION GAP SERPL CALC-SCNC: 11 MMOL/L (ref 8–16)
ANION GAP SERPL CALC-SCNC: 13 MMOL/L (ref 8–16)
APTT BLDCRRT: 49.5 SEC (ref 21–32)
AST SERPL-CCNC: 28 U/L (ref 10–40)
AST SERPL-CCNC: 29 U/L (ref 10–40)
AST SERPL-CCNC: 29 U/L (ref 10–40)
BASOPHILS # BLD AUTO: 0.03 K/UL (ref 0–0.2)
BASOPHILS NFR BLD: 0.3 % (ref 0–1.9)
BILIRUB SERPL-MCNC: 1.2 MG/DL (ref 0.1–1)
BLD GP AB SCN CELLS X3 SERPL QL: NORMAL
BNP SERPL-MCNC: 3053 PG/ML (ref 0–99)
BUN SERPL-MCNC: 18 MG/DL (ref 6–20)
BUN SERPL-MCNC: 20 MG/DL (ref 6–20)
CALCIUM SERPL-MCNC: 9.6 MG/DL (ref 8.7–10.5)
CALCIUM SERPL-MCNC: 9.7 MG/DL (ref 8.7–10.5)
CHLORIDE SERPL-SCNC: 84 MMOL/L (ref 95–110)
CHLORIDE SERPL-SCNC: 84 MMOL/L (ref 95–110)
CHLORIDE SERPL-SCNC: 85 MMOL/L (ref 95–110)
CHLORIDE SERPL-SCNC: 85 MMOL/L (ref 95–110)
CO2 SERPL-SCNC: 35 MMOL/L (ref 23–29)
CO2 SERPL-SCNC: 35 MMOL/L (ref 23–29)
CO2 SERPL-SCNC: 38 MMOL/L (ref 23–29)
CO2 SERPL-SCNC: 39 MMOL/L (ref 23–29)
CREAT SERPL-MCNC: 1.6 MG/DL (ref 0.5–1.4)
CREAT SERPL-MCNC: 1.8 MG/DL (ref 0.5–1.4)
DELSYS: ABNORMAL
DIFFERENTIAL METHOD: ABNORMAL
EOSINOPHIL # BLD AUTO: 0.2 K/UL (ref 0–0.5)
EOSINOPHIL NFR BLD: 1.9 % (ref 0–8)
ERYTHROCYTE [DISTWIDTH] IN BLOOD BY AUTOMATED COUNT: 17.3 % (ref 11.5–14.5)
ERYTHROCYTE [SEDIMENTATION RATE] IN BLOOD BY WESTERGREN METHOD: 18 MM/H
EST. GFR  (AFRICAN AMERICAN): 47.9 ML/MIN/1.73 M^2
EST. GFR  (AFRICAN AMERICAN): 55.2 ML/MIN/1.73 M^2
EST. GFR  (NON AFRICAN AMERICAN): 41.4 ML/MIN/1.73 M^2
EST. GFR  (NON AFRICAN AMERICAN): 47.8 ML/MIN/1.73 M^2
FLOW: 4
GLUCOSE SERPL-MCNC: 164 MG/DL (ref 70–110)
GLUCOSE SERPL-MCNC: 183 MG/DL (ref 70–110)
GLUCOSE SERPL-MCNC: 209 MG/DL (ref 70–110)
GLUCOSE SERPL-MCNC: 209 MG/DL (ref 70–110)
HCO3 UR-SCNC: 42.2 MMOL/L (ref 24–28)
HCT VFR BLD AUTO: 36.3 % (ref 40–54)
HGB BLD-MCNC: 11.4 G/DL (ref 14–18)
IMM GRANULOCYTES # BLD AUTO: 0.03 K/UL (ref 0–0.04)
IMM GRANULOCYTES NFR BLD AUTO: 0.3 % (ref 0–0.5)
LYMPHOCYTES # BLD AUTO: 1.3 K/UL (ref 1–4.8)
LYMPHOCYTES NFR BLD: 13.4 % (ref 18–48)
MAGNESIUM SERPL-MCNC: 1.5 MG/DL (ref 1.6–2.6)
MAGNESIUM SERPL-MCNC: 1.8 MG/DL (ref 1.6–2.6)
MAGNESIUM SERPL-MCNC: 1.9 MG/DL (ref 1.6–2.6)
MCH RBC QN AUTO: 26.4 PG (ref 27–31)
MCHC RBC AUTO-ENTMCNC: 31.4 G/DL (ref 32–36)
MCV RBC AUTO: 84 FL (ref 82–98)
METHEMOGLOBIN: 0.4 % (ref 0–3)
MODE: ABNORMAL
MONOCYTES # BLD AUTO: 0.8 K/UL (ref 0.3–1)
MONOCYTES NFR BLD: 8.3 % (ref 4–15)
NEUTROPHILS # BLD AUTO: 7.6 K/UL (ref 1.8–7.7)
NEUTROPHILS NFR BLD: 75.8 % (ref 38–73)
NRBC BLD-RTO: 0 /100 WBC
PCO2 BLDA: 55.6 MMHG (ref 35–45)
PH SMN: 7.49 [PH] (ref 7.35–7.45)
PHOSPHATE SERPL-MCNC: 4.7 MG/DL (ref 2.7–4.5)
PLATELET # BLD AUTO: 121 K/UL (ref 150–350)
PMV BLD AUTO: 11.2 FL (ref 9.2–12.9)
PO2 BLDA: 38 MMHG (ref 40–60)
POC BE: 19 MMOL/L
POC SATURATED O2: 74 % (ref 95–100)
POC TCO2: 44 MMOL/L (ref 24–29)
POTASSIUM SERPL-SCNC: 3.8 MMOL/L (ref 3.5–5.1)
POTASSIUM SERPL-SCNC: 3.8 MMOL/L (ref 3.5–5.1)
POTASSIUM SERPL-SCNC: 4.1 MMOL/L (ref 3.5–5.1)
POTASSIUM SERPL-SCNC: 4.1 MMOL/L (ref 3.5–5.1)
PROT SERPL-MCNC: 7.3 G/DL (ref 6–8.4)
PROT SERPL-MCNC: 7.4 G/DL (ref 6–8.4)
PROT SERPL-MCNC: 7.6 G/DL (ref 6–8.4)
RBC # BLD AUTO: 4.32 M/UL (ref 4.6–6.2)
SAMPLE: ABNORMAL
SITE: ABNORMAL
SODIUM SERPL-SCNC: 130 MMOL/L (ref 136–145)
SODIUM SERPL-SCNC: 130 MMOL/L (ref 136–145)
SODIUM SERPL-SCNC: 135 MMOL/L (ref 136–145)
SODIUM SERPL-SCNC: 136 MMOL/L (ref 136–145)
SP02: 92
WBC # BLD AUTO: 10.02 K/UL (ref 3.9–12.7)

## 2020-01-21 PROCEDURE — 83735 ASSAY OF MAGNESIUM: CPT | Mod: 91

## 2020-01-21 PROCEDURE — 85730 THROMBOPLASTIN TIME PARTIAL: CPT

## 2020-01-21 PROCEDURE — 99291 CRITICAL CARE FIRST HOUR: CPT | Mod: ,,, | Performed by: INTERNAL MEDICINE

## 2020-01-21 PROCEDURE — 80053 COMPREHEN METABOLIC PANEL: CPT | Mod: 91,NTX

## 2020-01-21 PROCEDURE — 63600367 HC NITRIC OXIDE PER HOUR

## 2020-01-21 PROCEDURE — 80053 COMPREHEN METABOLIC PANEL: CPT | Mod: 91

## 2020-01-21 PROCEDURE — 83735 ASSAY OF MAGNESIUM: CPT

## 2020-01-21 PROCEDURE — 85025 COMPLETE CBC W/AUTO DIFF WBC: CPT

## 2020-01-21 PROCEDURE — 99900035 HC TECH TIME PER 15 MIN (STAT)

## 2020-01-21 PROCEDURE — 25000003 PHARM REV CODE 250: Performed by: INTERNAL MEDICINE

## 2020-01-21 PROCEDURE — 27000221 HC OXYGEN, UP TO 24 HOURS

## 2020-01-21 PROCEDURE — 63600175 PHARM REV CODE 636 W HCPCS: Performed by: INTERNAL MEDICINE

## 2020-01-21 PROCEDURE — 25000003 PHARM REV CODE 250: Performed by: STUDENT IN AN ORGANIZED HEALTH CARE EDUCATION/TRAINING PROGRAM

## 2020-01-21 PROCEDURE — 84100 ASSAY OF PHOSPHORUS: CPT

## 2020-01-21 PROCEDURE — 82803 BLOOD GASES ANY COMBINATION: CPT

## 2020-01-21 PROCEDURE — 83880 ASSAY OF NATRIURETIC PEPTIDE: CPT

## 2020-01-21 PROCEDURE — 27000202 HC IABP, ADD'L DAY

## 2020-01-21 PROCEDURE — 86901 BLOOD TYPING SEROLOGIC RH(D): CPT

## 2020-01-21 PROCEDURE — 63600175 PHARM REV CODE 636 W HCPCS: Performed by: STUDENT IN AN ORGANIZED HEALTH CARE EDUCATION/TRAINING PROGRAM

## 2020-01-21 PROCEDURE — 94761 N-INVAS EAR/PLS OXIMETRY MLT: CPT

## 2020-01-21 PROCEDURE — 83050 HGB METHEMOGLOBIN QUAN: CPT

## 2020-01-21 PROCEDURE — 20000000 HC ICU ROOM

## 2020-01-21 PROCEDURE — 25000003 PHARM REV CODE 250: Performed by: HOSPITALIST

## 2020-01-21 PROCEDURE — 99291 PR CRITICAL CARE, E/M 30-74 MINUTES: ICD-10-PCS | Mod: ,,, | Performed by: INTERNAL MEDICINE

## 2020-01-21 PROCEDURE — 25000003 PHARM REV CODE 250: Performed by: NURSE PRACTITIONER

## 2020-01-21 RX ORDER — FAMOTIDINE 20 MG/1
40 TABLET, FILM COATED ORAL DAILY
Status: DISCONTINUED | OUTPATIENT
Start: 2020-01-21 | End: 2020-01-24

## 2020-01-21 RX ADMIN — TRIAMCINOLONE ACETONIDE: 5 CREAM TOPICAL at 09:01

## 2020-01-21 RX ADMIN — HEPARIN SODIUM AND DEXTROSE 16 UNITS/KG/HR: 10000; 5 INJECTION INTRAVENOUS at 12:01

## 2020-01-21 RX ADMIN — FUROSEMIDE 40 MG/HR: 10 INJECTION, SOLUTION INTRAMUSCULAR; INTRAVENOUS at 12:01

## 2020-01-21 RX ADMIN — ACETAMINOPHEN 1000 MG: 500 TABLET ORAL at 09:01

## 2020-01-21 RX ADMIN — ISOSORBIDE DINITRATE 10 MG: 10 TABLET ORAL at 09:01

## 2020-01-21 RX ADMIN — FUROSEMIDE 40 MG/HR: 10 INJECTION, SOLUTION INTRAMUSCULAR; INTRAVENOUS at 11:01

## 2020-01-21 RX ADMIN — HYDRALAZINE HYDROCHLORIDE 50 MG: 50 TABLET ORAL at 09:01

## 2020-01-21 RX ADMIN — HYDRALAZINE HYDROCHLORIDE 50 MG: 50 TABLET ORAL at 02:01

## 2020-01-21 RX ADMIN — STANDARDIZED SENNA CONCENTRATE AND DOCUSATE SODIUM 2 TABLET: 8.6; 5 TABLET ORAL at 09:01

## 2020-01-21 RX ADMIN — ACETAMINOPHEN 1000 MG: 500 TABLET ORAL at 02:01

## 2020-01-21 RX ADMIN — POLYETHYLENE GLYCOL 3350 17 G: 17 POWDER, FOR SOLUTION ORAL at 09:01

## 2020-01-21 RX ADMIN — ATORVASTATIN CALCIUM 40 MG: 20 TABLET, FILM COATED ORAL at 09:01

## 2020-01-21 RX ADMIN — FAMOTIDINE 40 MG: 20 TABLET ORAL at 09:01

## 2020-01-21 RX ADMIN — ISOSORBIDE DINITRATE 10 MG: 10 TABLET ORAL at 02:01

## 2020-01-21 RX ADMIN — TRIAMCINOLONE ACETONIDE: 5 CREAM TOPICAL at 02:01

## 2020-01-21 RX ADMIN — ACETAMINOPHEN 1000 MG: 500 TABLET ORAL at 06:01

## 2020-01-21 RX ADMIN — HYDRALAZINE HYDROCHLORIDE 50 MG: 50 TABLET ORAL at 06:01

## 2020-01-21 RX ADMIN — Medication 6 MG: at 09:01

## 2020-01-21 RX ADMIN — CETIRIZINE HYDROCHLORIDE 10 MG: 10 TABLET, FILM COATED ORAL at 09:01

## 2020-01-21 RX ADMIN — DOBUTAMINE IN DEXTROSE 5 MCG/KG/MIN: 200 INJECTION, SOLUTION INTRAVENOUS at 12:01

## 2020-01-21 RX ADMIN — ISOSORBIDE DINITRATE 10 MG: 10 TABLET ORAL at 06:01

## 2020-01-21 NOTE — PLAN OF CARE
CMICU DAILY GOALS       A: Awake    RASS: Goal - RASS Goal: 0-->alert and calm  Actual - RASS ([place]illan Agitation-Sedation Scale): 0-->alert and calm   Restraint necessity: Clinical Justification: Removing medical devices  B: Breath   SBT: Not intubated   C: Coordinate A & B, analgesics/sedatives   Pain: managed    SAT: Not intubated  D: Delirium   CAM-ICU: Overall CAM-ICU: Negative  E: Early Mobility   MOVE Screen: Pass   Activity: Activity Management: bedrest maintained per order  FAS: Feeding/Nutrition   Diet order: Diet/Nutrition Received: 2 gram sodium, consistent carb/diabetic diet,   Fluid restriction: Fluid Requirement: 1500cc FR  T: Thrombus   DVT prophylaxis: VTE Required Core Measure: Pharmacological prophylaxis initiated/maintained  H: HOB Elevation   Head of Bed (HOB): HOB at 15 degrees  U: Ulcer Prophylaxis   GI: yes  G: Glucose control   managed    S: Skin   Bundle compliance: yes   Bathing/Skin Care: incontinence care, bath, chlorhexidine, back care Date: [unfilled]  B: Bowel Function   no issues   I: Indwelling Catheters   Mc necessity:      Urethral Catheter 01/15/20 2030 Straight-tip 16 Fr.-Reason for Continuing Urinary Catheterization: Critically ill in ICU requiring intensive monitoring   CVC necessity: No   IPAD offered: Not appropriate  D: De-escalation Antibx   Yes  Plan for the day   Continue to educate pt about infection control              Continue to monitor fluid levels and kidney fxn    Family/Goals of care/Code Status   Code Status: Full Code     No acute events throughout the night. IABP in place without alarms. Pt continues to need education about not touching the dressing. He continues to remove dressing, continued education has been given, pt verbalizes an understanding but continues to remove dressing. Pt also needs continued education about wearing oxygen, pt verbalized an understanding but he continues to remove oxygen and place on his head. Will continue to monitor pt for  changes in status and educate him about care. UOP 6L. CVP 14 this morning, SvO2 74%. PTT therapeutic this morning. VS and assessment per flow sheet, patient progressing towards goals as tolerated, plan of care reviewed with Saba Lott, all concerns addressed, will continue to monitor.

## 2020-01-21 NOTE — PROGRESS NOTES
"PRE-EDUCATION BOOKLET NOTE:    Met with Saba Lott and had a brief discussion regarding the advanced heart failure evaluation process including options for heart transplantation and/or left ventricular assist device (LVAD) implantation.     Heart Transplant Educational Booklet given to patient, which included the following handouts:  · Treatment options for Advanced Heart Failure: A Referral Guide for patients  · Pre Heart Transplant Coordinator Team Contact Information   · Recipient Informed Consent   · Wellness Contract  · Multiple Listing Protocol and [facility] toll free numbers   · VAD Education Packet   · Advanced Directives  · 8 Step Plan for Heart Failure Patients     Significant History:  · Prior sternotomies: No  · ICD: Yes. Unsure of brand. States placed about 1 year ago.  · Blood transfusions: Yes. 2012.  · Angiography: Yes. [facility] "a couple of weeks ago".   · Colonoscopy : Yes. [facility] near [facility].  · Pap smear: N/A  · Mammogram: N/A  · Tobacco history : Smoked 10 yrs -1 pk/ day - Quit about 10 years ago  · Stroke history: No  · Thromboembolism history: Yes. Right leg - 2019    Question and answer session was conducted.  Patient was advised to bring the educational packet to future appointments and/or admissions. Patient was encouraged to contact the coordinator team with any questions or concerns. Understanding verbalized.    "

## 2020-01-21 NOTE — PROGRESS NOTES
At the request of Dr. Tafoya, I have been asked to meet patient and provide VAD education. Introduced self and reason for visit. Pt awake, no family at bedside.  Provided phase 1 written VAD education. Included in Phase 1 folder is the following:     Evaluation Eval for [facility]  VAD support flyer  Debby: Living a more active life  Living with hVAD system  [place] pamphlet  Picture of 3 VADs offered at [facility]    Explained that we use 3 different types of pumps here and information on pumps is in the black folder. I explained the work up process as well.     Explained to look over the entire contents and read Evaluation Eval for [facility] acknowledgement form.  Also explained that they should bring this folder with them to all clinic visits and if they are admitted to the hospital so that we can continue education as needed. Should there be any questions, please write them down and bring with you or feel free to call and we can talk on the phone. All questions answered to his satisfaction as evidence by verbal acknowledgement.

## 2020-01-21 NOTE — SUBJECTIVE & OBJECTIVE
Interval History: He been scratching at the balloon pump insertion site.there was some bleeding at the site. CVP 14, svo2 74,CO/CI 11/5.2    Continuous Infusions:   DOBUTamine 5 mcg/kg/min (01/21/20 1100)    furosemide (LASIX) 10 mg/mL infusion (non-titrating) 40 mg/hr (01/21/20 1100)    heparin (porcine) in D5W 16 Units/kg/hr (01/21/20 1100)    nitric oxide gas       Scheduled Meds:   acetaminophen  1,000 mg Oral Q8H    atorvastatin  40 mg Oral Daily    cetirizine  10 mg Oral Daily    famotidine  40 mg Oral Daily    hydrALAZINE  50 mg Oral Q8H    isosorbide dinitrate  10 mg Oral Q8H    magnesium sulfate IVPB  1 g Intravenous Once    melatonin  6 mg Oral Nightly    polyethylene glycol  17 g Oral Daily    senna-docusate 8.6-50 mg  2 tablet Oral BID    triamcinolone acetonide 0.5%   Topical (Top) TID     PRN Meds:albuterol, bisacodyl, heparin (PORCINE), heparin (PORCINE), hydrOXYzine HCl, sodium chloride 0.9%, traMADol    Review of patient's allergies indicates:   Allergen Reactions    Iodine and iodide containing products      Objective:     Vital Signs (Most Recent):  Temp: 98 °F (36.7 °C) (01/21/20 1100)  Pulse: 109 (01/21/20 1100)  Resp: (!) 21 (01/21/20 1100)  BP: (!) 121/59 (01/21/20 1100)  SpO2: (!) 92 % (01/21/20 1100) Vital Signs (24h Range):  Temp:  [97.6 °F (36.4 °C)-98.3 °F (36.8 °C)] 98 °F (36.7 °C)  Pulse:  [] 109  Resp:  [14-57] 21  SpO2:  [88 %-97 %] 92 %  BP: (102-150)/(58-85) 121/59     Patient Vitals for the past 72 hrs (Last 3 readings):   Weight   01/21/20 0300 102.2 kg (225 lb 5 oz)   01/20/20 0701 108 kg (238 lb 1.6 oz)   01/19/20 0300 118.3 kg (260 lb 12.9 oz)     Body mass index is 35.29 kg/m².      Intake/Output Summary (Last 24 hours) at 1/21/2020 1132  Last data filed at 1/21/2020 1100  Gross per 24 hour   Intake 1533.51 ml   Output 28618 ml   Net -8896.49 ml       Hemodynamic Parameters:       Telemetry: NSR     Physical Exam  Constitutional: He is oriented to person,  place, and time. He appears well-developed and well-nourished.   HENT:   Head: Normocephalic and atraumatic.   Eyes: Pupils are equal, round, and reactive to light. Conjunctivae and EOM are normal.   Neck: Normal range of motion. Neck supple. No thyromegaly present.   Cardiovascular: Normal rate and regular rhythm.   + S3   Pulmonary/Chest: Effort normal and breath sounds normal.   Abdominal: Soft. Bowel sounds are normal.   Musculoskeletal:   Edema present     Neurological: He is alert and oriented to person, place, and time.   Skin: Skin is warm and dry. Capillary refill takes 2 to 3 seconds.   Psychiatric: He has a normal mood and affect. His behavior is normal. Judgment and thought content normal  Significant Labs:  CBC:  Recent Labs   Lab 01/19/20  0300 01/20/20  0235 01/21/20  0352   WBC 9.54 9.96 10.02   RBC 4.23* 4.17* 4.32*   HGB 11.3* 11.3* 11.4*   HCT 35.8* 35.4* 36.3*   * 128* 121*   MCV 85 85 84   MCH 26.7* 27.1 26.4*   MCHC 31.6* 31.9* 31.4*     BNP:  Recent Labs   Lab 01/16/20  1106 01/17/20  0424 01/21/20  0755   BNP 2,534* 1,851* 3,053*     CMP:  Recent Labs   Lab 01/20/20  0235 01/20/20  1443 01/21/20  0352   GLU 98  98 165* 209*  209*   CALCIUM 8.8  8.8 8.8 9.6  9.6   ALBUMIN 3.2* 2.9* 3.4*   PROT 7.1 6.9 7.6     136 134* 130*  130*   K 3.9  3.9 3.9 4.1  4.1   CO2 34*  34* 30* 35*  35*   CL 93*  93* 93* 84*  84*   BUN 19  19 17 18  18   CREATININE 1.5*  1.5* 1.5* 1.6*  1.6*   ALKPHOS 117 106 120   ALT 15 16 17   AST 26 27 29   BILITOT 1.2* 1.1* 1.2*      Coagulation:   Recent Labs   Lab 01/15/20  2001  01/19/20  0300 01/20/20  0235 01/21/20  0352   INR 2.0*  --   --   --   --    APTT 26.1   < > 50.8* 59.2* 49.5*    < > = values in this interval not displayed.     LDH:  No results for input(s): LDH in the last 72 hours.  Microbiology:  Microbiology Results (last 7 days)     ** No results found for the last 168 hours. **          I have reviewed all pertinent labs within  the past 24 hours.    Estimated Creatinine Clearance: 59.4 mL/min (A) (based on SCr of 1.6 mg/dL (H)).    Diagnostic Results:  I have reviewed and interpreted all pertinent imaging results/findings within the past 24 hours.

## 2020-01-21 NOTE — PROGRESS NOTES
01/21/2020  Yudith Mcmanus    Current provider:  Lian Daniel MD      I, Yudith Mcmanus, rounded on Saba Lott to ensure all mechanical assist device settings (IABP or VAD) were appropriate and all parameters were within limits.  I was able to ensure all back up equipment was present, the staff had no issues, and the Perfusion Department daily rounding was complete.    8:31 AM

## 2020-01-21 NOTE — PROGRESS NOTES
[facility]  Heart Transplant  Progress Note    Patient Name: Saba Lott  MRN: [id number]  Admission Date: 1/15/2020  Hospital Length of Stay: 6 days  Attending Physician: Lian Daniel MD  Primary Care Provider: Primary Doctor No  Principal Problem:Cardiogenic shock    Subjective:     Interval History: He been scratching at the balloon pump insertion site.there was some bleeding at the site. CVP 14, svo2 74,CO/CI 11/5.2    Continuous Infusions:   DOBUTamine 5 mcg/kg/min (01/21/20 1100)    furosemide (LASIX) 10 mg/mL infusion (non-titrating) 40 mg/hr (01/21/20 1100)    heparin (porcine) in D5W 16 Units/kg/hr (01/21/20 1100)    nitric oxide gas       Scheduled Meds:   acetaminophen  1,000 mg Oral Q8H    atorvastatin  40 mg Oral Daily    cetirizine  10 mg Oral Daily    famotidine  40 mg Oral Daily    hydrALAZINE  50 mg Oral Q8H    isosorbide dinitrate  10 mg Oral Q8H    magnesium sulfate IVPB  1 g Intravenous Once    melatonin  6 mg Oral Nightly    polyethylene glycol  17 g Oral Daily    senna-docusate 8.6-50 mg  2 tablet Oral BID    triamcinolone acetonide 0.5%   Topical (Top) TID     PRN Meds:albuterol, bisacodyl, heparin (PORCINE), heparin (PORCINE), hydrOXYzine HCl, sodium chloride 0.9%, traMADol    Review of patient's allergies indicates:   Allergen Reactions    Iodine and iodide containing products      Objective:     Vital Signs (Most Recent):  Temp: 98 °F (36.7 °C) (01/21/20 1100)  Pulse: 109 (01/21/20 1100)  Resp: (!) 21 (01/21/20 1100)  BP: (!) 121/59 (01/21/20 1100)  SpO2: (!) 92 % (01/21/20 1100) Vital Signs (24h Range):  Temp:  [97.6 °F (36.4 °C)-98.3 °F (36.8 °C)] 98 °F (36.7 °C)  Pulse:  [] 109  Resp:  [14-57] 21  SpO2:  [88 %-97 %] 92 %  BP: (102-150)/(58-85) 121/59     Patient Vitals for the past 72 hrs (Last 3 readings):   Weight   01/21/20 0300 102.2 kg (225 lb 5 oz)   01/20/20 0701 108 kg (238 lb 1.6 oz)   01/19/20 0300 118.3 kg (260 lb 12.9 oz)     Body  mass index is 35.29 kg/m².      Intake/Output Summary (Last 24 hours) at 1/21/2020 1132  Last data filed at 1/21/2020 1100  Gross per 24 hour   Intake 1533.51 ml   Output 52374 ml   Net -8896.49 ml       Hemodynamic Parameters:       Telemetry: NSR     Physical Exam  Constitutional: He is oriented to person, place, and time. He appears well-developed and well-nourished.   HENT:   Head: Normocephalic and atraumatic.   Eyes: Pupils are equal, round, and reactive to light. Conjunctivae and EOM are normal.   Neck: Normal range of motion. Neck supple. No thyromegaly present.   Cardiovascular: Normal rate and regular rhythm.   + S3   Pulmonary/Chest: Effort normal and breath sounds normal.   Abdominal: Soft. Bowel sounds are normal.   Musculoskeletal:   Edema present     Neurological: He is alert and oriented to person, place, and time.   Skin: Skin is warm and dry. Capillary refill takes 2 to 3 seconds.   Psychiatric: He has a normal mood and affect. His behavior is normal. Judgment and thought content normal  Significant Labs:  CBC:  Recent Labs   Lab 01/19/20  0300 01/20/20  0235 01/21/20  0352   WBC 9.54 9.96 10.02   RBC 4.23* 4.17* 4.32*   HGB 11.3* 11.3* 11.4*   HCT 35.8* 35.4* 36.3*   * 128* 121*   MCV 85 85 84   MCH 26.7* 27.1 26.4*   MCHC 31.6* 31.9* 31.4*     BNP:  Recent Labs   Lab 01/16/20  1106 01/17/20  0424 01/21/20  0755   BNP 2,534* 1,851* 3,053*     CMP:  Recent Labs   Lab 01/20/20  0235 01/20/20  1443 01/21/20  0352   GLU 98  98 165* 209*  209*   CALCIUM 8.8  8.8 8.8 9.6  9.6   ALBUMIN 3.2* 2.9* 3.4*   PROT 7.1 6.9 7.6     136 134* 130*  130*   K 3.9  3.9 3.9 4.1  4.1   CO2 34*  34* 30* 35*  35*   CL 93*  93* 93* 84*  84*   BUN 19  19 17 18  18   CREATININE 1.5*  1.5* 1.5* 1.6*  1.6*   ALKPHOS 117 106 120   ALT 15 16 17   AST 26 27 29   BILITOT 1.2* 1.1* 1.2*      Coagulation:   Recent Labs   Lab 01/15/20  2001  01/19/20  0300 01/20/20  0235 01/21/20  0352   INR 2.0*  --    --   --   --    APTT 26.1   < > 50.8* 59.2* 49.5*    < > = values in this interval not displayed.     LDH:  No results for input(s): LDH in the last 72 hours.  Microbiology:  Microbiology Results (last 7 days)     ** No results found for the last 168 hours. **          I have reviewed all pertinent labs within the past 24 hours.    Estimated Creatinine Clearance: 59.4 mL/min (A) (based on SCr of 1.6 mg/dL (H)).    Diagnostic Results:  I have reviewed and interpreted all pertinent imaging results/findings within the past 24 hours.    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 Chronic MARC - Class II-III and mild LE edema.      Hospital Course (synopsis of major diagnoses, care, treatment, and services provided during the course of the hospital stay):  -2/12 admit to CDU for diuresis with IV lasix  -IV abx   -CXR with suspected small left pleural effusion with associated left basilar atelectasis versus evolving airspace disease  -2/13 single chamber ICD implant; IV lasix decreased to BID  -2/16 add dobutamine, hold BB due to hypotension, no ACE-I or ARB due to recent HPI hypotension  -2/17 Lasix changed to PO  -2/18 dobutamine discontinued    Admitted to [facility] on Thursday due to severe SOB for a week with associated orthopnea, MARC, and PND unable to lie flat and found to be in acute diastolic heart failure. States he normally weighs around 219 but up to 254lb on admission tonight. Says he hasn't been the most compliant in terms of fluid restriction and daily weights but has avoided salt. BNP on admission was 2375. BUN/CRT 32/1.4 He was started on IV diuretics but continued to deteriorate. Creatinine continued to increase and reached 4.3 with oliguria. He was started on CRRT for a few days and tolerated well. Renal  u/s was negative for obstruction and liver u/s without findings of cirrhosis. All of this was progression of cardiorenal syndrome with liver congestion from severe volume overload. On arrival vitals stable and in no acute distress. He has obvious anasarca up to the chest. He did have uop of 500 at time of arrival also.    TTE 5/28/19  · Moderate left ventricular enlargement.  · Severely decreased left ventricular systolic function. The estimated ejection fraction is 20%  · Global hypokinetic wall motion.  · Grade III (severe) left ventricular diastolic dysfunction consistent with restrictive physiology.  · Severe left atrial enlargement.  · Moderate right ventricular enlargement.  · Low normal right ventricular systolic function.  · Mild right atrial enlargement.  · Moderate mitral regurgitation.  Mild to moderate tricuspid regurgitation    * Cardiogenic shock  -NICM; Likely Etoh induced  -Last 2D Echo 1/16/20: LVEF 20%, LVEDD 6.92 cm   -[facility] with IABP at 1:1 for further level of care  - CVP 18, SVO2 76, CO/CI 12/5.2 while on Dobutamine @5, Radha 10 and IABP 1:1  - On lasix 40 mg/hr. Negative 7 lit in 24 hrs.  Will start pathway.         Pruritus  -triamcinolone cream, atarax, Zyrtec and Famotidine        ANJELICA (acute kidney injury)  - cr trending down with diuresis     Essential hypertension  -Currently essentially normotensive   -Continue Hyd/Isordil    Deep vein thrombosis (DVT) of right lower extremity  -Unsure of timing but was on eliquis PTA.   -Continue on heparin gtt for now       AICD (automatic cardioverter/defibrillator) present  -Medtronic placed 2019 for primary prevention        Asad Westbrook MD  Heart Transplant  [facility]

## 2020-01-22 ENCOUNTER — TELEPHONE (OUTPATIENT)
Dept: TRANSPLANT | Facility: CLINIC | Age: 55
End: 2020-01-22

## 2020-01-22 ENCOUNTER — EDUCATION (OUTPATIENT)
Dept: TRANSPLANT | Facility: CLINIC | Age: 55
End: 2020-01-22

## 2020-01-22 LAB
ABORH REPEAT: NORMAL
ALBUMIN SERPL BCP-MCNC: 3.2 G/DL (ref 3.5–5.2)
ALBUMIN SERPL BCP-MCNC: 3.3 G/DL (ref 3.5–5.2)
ALLENS TEST: ABNORMAL
ALP SERPL-CCNC: 108 U/L (ref 55–135)
ALP SERPL-CCNC: 111 U/L (ref 55–135)
ALT SERPL W/O P-5'-P-CCNC: 16 U/L (ref 10–44)
ALT SERPL W/O P-5'-P-CCNC: 16 U/L (ref 10–44)
ANION GAP SERPL CALC-SCNC: 11 MMOL/L (ref 8–16)
ANION GAP SERPL CALC-SCNC: 13 MMOL/L (ref 8–16)
ANION GAP SERPL CALC-SCNC: 13 MMOL/L (ref 8–16)
APTT BLDCRRT: 43.9 SEC (ref 21–32)
APTT BLDCRRT: 45.4 SEC (ref 21–32)
AST SERPL-CCNC: 27 U/L (ref 10–40)
AST SERPL-CCNC: 28 U/L (ref 10–40)
BACTERIA #/AREA URNS AUTO: NORMAL /HPF
BASOPHILS # BLD AUTO: 0.04 K/UL (ref 0–0.2)
BASOPHILS NFR BLD: 0.4 % (ref 0–1.9)
BILIRUB DIRECT SERPL-MCNC: 0.8 MG/DL (ref 0.1–0.3)
BILIRUB SERPL-MCNC: 1.1 MG/DL (ref 0.1–1)
BILIRUB SERPL-MCNC: 1.3 MG/DL (ref 0.1–1)
BILIRUB UR QL STRIP: NEGATIVE
BNP SERPL-MCNC: 2342 PG/ML (ref 0–99)
BUN SERPL-MCNC: 20 MG/DL (ref 6–20)
BUN SERPL-MCNC: 20 MG/DL (ref 6–20)
BUN SERPL-MCNC: 21 MG/DL (ref 6–20)
CALCIUM SERPL-MCNC: 9.7 MG/DL (ref 8.7–10.5)
CALCIUM SERPL-MCNC: 9.8 MG/DL (ref 8.7–10.5)
CALCIUM SERPL-MCNC: 9.8 MG/DL (ref 8.7–10.5)
CHLORIDE SERPL-SCNC: 77 MMOL/L (ref 95–110)
CHLORIDE SERPL-SCNC: 77 MMOL/L (ref 95–110)
CHLORIDE SERPL-SCNC: 81 MMOL/L (ref 95–110)
CHOLEST SERPL-MCNC: 140 MG/DL (ref 120–199)
CHOLEST/HDLC SERPL: 2.3 {RATIO} (ref 2–5)
CLARITY UR REFRACT.AUTO: CLEAR
CO2 SERPL-SCNC: 39 MMOL/L (ref 23–29)
CO2 SERPL-SCNC: 39 MMOL/L (ref 23–29)
CO2 SERPL-SCNC: 41 MMOL/L (ref 23–29)
COLOR UR AUTO: YELLOW
COMPLEXED PSA SERPL-MCNC: 1.4 NG/ML (ref 0–4)
CREAT SERPL-MCNC: 1.7 MG/DL (ref 0.5–1.4)
DELSYS: ABNORMAL
DIFFERENTIAL METHOD: ABNORMAL
EOSINOPHIL # BLD AUTO: 0.2 K/UL (ref 0–0.5)
EOSINOPHIL NFR BLD: 1.7 % (ref 0–8)
ERYTHROCYTE [DISTWIDTH] IN BLOOD BY AUTOMATED COUNT: 17.1 % (ref 11.5–14.5)
EST. GFR  (AFRICAN AMERICAN): 51.3 ML/MIN/1.73 M^2
EST. GFR  (NON AFRICAN AMERICAN): 44.4 ML/MIN/1.73 M^2
FERRITIN SERPL-MCNC: 109 NG/ML (ref 20–300)
GLUCOSE SERPL-MCNC: 191 MG/DL (ref 70–110)
GLUCOSE SERPL-MCNC: 289 MG/DL (ref 70–110)
GLUCOSE SERPL-MCNC: 289 MG/DL (ref 70–110)
GLUCOSE UR QL STRIP: NEGATIVE
HCO3 UR-SCNC: 47.5 MMOL/L (ref 24–28)
HCO3 UR-SCNC: 47.9 MMOL/L (ref 24–28)
HCO3 UR-SCNC: 48.3 MMOL/L (ref 24–28)
HCT VFR BLD AUTO: 36.2 % (ref 40–54)
HDLC SERPL-MCNC: 60 MG/DL (ref 40–75)
HDLC SERPL: 42.9 % (ref 20–50)
HGB BLD-MCNC: 11.7 G/DL (ref 14–18)
HGB UR QL STRIP: NEGATIVE
IMM GRANULOCYTES # BLD AUTO: 0.04 K/UL (ref 0–0.04)
IMM GRANULOCYTES NFR BLD AUTO: 0.4 % (ref 0–0.5)
IRON SERPL-MCNC: 76 UG/DL (ref 45–160)
KETONES UR QL STRIP: NEGATIVE
LDH SERPL L TO P-CCNC: 387 U/L (ref 110–260)
LDLC SERPL CALC-MCNC: 65.2 MG/DL (ref 63–159)
LEUKOCYTE ESTERASE UR QL STRIP: ABNORMAL
LYMPHOCYTES # BLD AUTO: 1.5 K/UL (ref 1–4.8)
LYMPHOCYTES NFR BLD: 14.6 % (ref 18–48)
MAGNESIUM SERPL-MCNC: 1.4 MG/DL (ref 1.6–2.6)
MAGNESIUM SERPL-MCNC: 1.5 MG/DL (ref 1.6–2.6)
MCH RBC QN AUTO: 26.8 PG (ref 27–31)
MCHC RBC AUTO-ENTMCNC: 32.3 G/DL (ref 32–36)
MCV RBC AUTO: 83 FL (ref 82–98)
METHEMOGLOBIN: 0.4 % (ref 0–3)
MICROSCOPIC COMMENT: NORMAL
MONOCYTES # BLD AUTO: 0.9 K/UL (ref 0.3–1)
MONOCYTES NFR BLD: 8.6 % (ref 4–15)
NEUTROPHILS # BLD AUTO: 7.4 K/UL (ref 1.8–7.7)
NEUTROPHILS NFR BLD: 74.3 % (ref 38–73)
NITRITE UR QL STRIP: NEGATIVE
NONHDLC SERPL-MCNC: 80 MG/DL
NRBC BLD-RTO: 0 /100 WBC
PCO2 BLDA: 55.8 MMHG (ref 35–45)
PCO2 BLDA: 57.8 MMHG (ref 35–45)
PCO2 BLDA: 58.5 MMHG (ref 35–45)
PH SMN: 7.52 [PH] (ref 7.35–7.45)
PH SMN: 7.53 [PH] (ref 7.35–7.45)
PH SMN: 7.55 [PH] (ref 7.35–7.45)
PH UR STRIP: 8 [PH] (ref 5–8)
PHOSPHATE SERPL-MCNC: 5.5 MG/DL (ref 2.7–4.5)
PLATELET # BLD AUTO: 129 K/UL (ref 150–350)
PMV BLD AUTO: 10.5 FL (ref 9.2–12.9)
PO2 BLDA: 39 MMHG (ref 40–60)
PO2 BLDA: 46 MMHG (ref 40–60)
PO2 BLDA: 48 MMHG (ref 40–60)
POC BE: 25 MMOL/L
POC BE: 25 MMOL/L
POC BE: 26 MMOL/L
POC SATURATED O2: 76 % (ref 95–100)
POC SATURATED O2: 85 % (ref 95–100)
POC SATURATED O2: 86 % (ref 95–100)
POC TCO2: 49 MMOL/L (ref 24–29)
POC TCO2: 50 MMOL/L (ref 24–29)
POC TCO2: >50 MMOL/L (ref 24–29)
POTASSIUM SERPL-SCNC: 3.5 MMOL/L (ref 3.5–5.1)
POTASSIUM SERPL-SCNC: 3.5 MMOL/L (ref 3.5–5.1)
POTASSIUM SERPL-SCNC: 4.1 MMOL/L (ref 3.5–5.1)
PREALB SERPL-MCNC: 15 MG/DL (ref 20–43)
PROT SERPL-MCNC: 7.4 G/DL (ref 6–8.4)
PROT SERPL-MCNC: 7.5 G/DL (ref 6–8.4)
PROT UR QL STRIP: NEGATIVE
RBC # BLD AUTO: 4.36 M/UL (ref 4.6–6.2)
RBC #/AREA URNS AUTO: 2 /HPF (ref 0–4)
SAMPLE: ABNORMAL
SITE: ABNORMAL
SODIUM SERPL-SCNC: 129 MMOL/L (ref 136–145)
SODIUM SERPL-SCNC: 129 MMOL/L (ref 136–145)
SODIUM SERPL-SCNC: 133 MMOL/L (ref 136–145)
SP GR UR STRIP: 1 (ref 1–1.03)
TRANSFERRIN SERPL-MCNC: 305 MG/DL (ref 200–375)
TRIGL SERPL-MCNC: 74 MG/DL (ref 30–150)
URN SPEC COLLECT METH UR: ABNORMAL
WBC # BLD AUTO: 9.92 K/UL (ref 3.9–12.7)
WBC #/AREA URNS AUTO: 3 /HPF (ref 0–5)

## 2020-01-22 PROCEDURE — 27000221 HC OXYGEN, UP TO 24 HOURS

## 2020-01-22 PROCEDURE — 99900035 HC TECH TIME PER 15 MIN (STAT): Mod: NTX

## 2020-01-22 PROCEDURE — 80053 COMPREHEN METABOLIC PANEL: CPT | Mod: NTX

## 2020-01-22 PROCEDURE — 36415 COLL VENOUS BLD VENIPUNCTURE: CPT | Mod: NTX

## 2020-01-22 PROCEDURE — 85025 COMPLETE CBC W/AUTO DIFF WBC: CPT | Mod: NTX

## 2020-01-22 PROCEDURE — 99291 CRITICAL CARE FIRST HOUR: CPT | Mod: NTX,,, | Performed by: INTERNAL MEDICINE

## 2020-01-22 PROCEDURE — 84134 ASSAY OF PREALBUMIN: CPT | Mod: NTX

## 2020-01-22 PROCEDURE — 83735 ASSAY OF MAGNESIUM: CPT | Mod: NTX

## 2020-01-22 PROCEDURE — 86682 HELMINTH ANTIBODY: CPT | Mod: NTX

## 2020-01-22 PROCEDURE — 99232 SBSQ HOSP IP/OBS MODERATE 35: CPT | Mod: NTX,,, | Performed by: NURSE PRACTITIONER

## 2020-01-22 PROCEDURE — 82803 BLOOD GASES ANY COMBINATION: CPT

## 2020-01-22 PROCEDURE — 27200188 HC TRANSDUCER, ART ADULT/PEDS: Mod: NTX

## 2020-01-22 PROCEDURE — 25000003 PHARM REV CODE 250: Mod: NTX | Performed by: HOSPITALIST

## 2020-01-22 PROCEDURE — 84466 ASSAY OF TRANSFERRIN: CPT | Mod: NTX

## 2020-01-22 PROCEDURE — 87340 HEPATITIS B SURFACE AG IA: CPT | Mod: NTX

## 2020-01-22 PROCEDURE — 25000003 PHARM REV CODE 250: Performed by: PHYSICIAN ASSISTANT

## 2020-01-22 PROCEDURE — 86790 VIRUS ANTIBODY NOS: CPT | Mod: NTX

## 2020-01-22 PROCEDURE — 80323 ALKALOIDS NOS: CPT | Mod: NTX

## 2020-01-22 PROCEDURE — 85730 THROMBOPLASTIN TIME PARTIAL: CPT | Mod: 91,NTX

## 2020-01-22 PROCEDURE — 25000003 PHARM REV CODE 250: Mod: NTX | Performed by: INTERNAL MEDICINE

## 2020-01-22 PROCEDURE — 83540 ASSAY OF IRON: CPT | Mod: NTX

## 2020-01-22 PROCEDURE — 94761 N-INVAS EAR/PLS OXIMETRY MLT: CPT

## 2020-01-22 PROCEDURE — 86644 CMV ANTIBODY: CPT | Mod: NTX

## 2020-01-22 PROCEDURE — 83615 LACTATE (LD) (LDH) ENZYME: CPT | Mod: NTX

## 2020-01-22 PROCEDURE — 99291 PR CRITICAL CARE, E/M 30-74 MINUTES: ICD-10-PCS | Mod: NTX,,, | Performed by: INTERNAL MEDICINE

## 2020-01-22 PROCEDURE — 86704 HEP B CORE ANTIBODY TOTAL: CPT | Mod: NTX

## 2020-01-22 PROCEDURE — 27000202 HC IABP, ADD'L DAY: Mod: NTX

## 2020-01-22 PROCEDURE — 25000003 PHARM REV CODE 250: Mod: NTX | Performed by: NURSE PRACTITIONER

## 2020-01-22 PROCEDURE — 86787 VARICELLA-ZOSTER ANTIBODY: CPT | Mod: NTX

## 2020-01-22 PROCEDURE — 63600175 PHARM REV CODE 636 W HCPCS: Mod: NTX | Performed by: INTERNAL MEDICINE

## 2020-01-22 PROCEDURE — 86706 HEP B SURFACE ANTIBODY: CPT | Mod: NTX

## 2020-01-22 PROCEDURE — 84100 ASSAY OF PHOSPHORUS: CPT | Mod: NTX

## 2020-01-22 PROCEDURE — 20000000 HC ICU ROOM: Mod: NTX

## 2020-01-22 PROCEDURE — 86592 SYPHILIS TEST NON-TREP QUAL: CPT | Mod: NTX

## 2020-01-22 PROCEDURE — 86777 TOXOPLASMA ANTIBODY: CPT | Mod: NTX

## 2020-01-22 PROCEDURE — 86696 HERPES SIMPLEX TYPE 2 TEST: CPT | Mod: NTX

## 2020-01-22 PROCEDURE — 25000003 PHARM REV CODE 250: Performed by: INTERNAL MEDICINE

## 2020-01-22 PROCEDURE — 25000003 PHARM REV CODE 250: Performed by: STUDENT IN AN ORGANIZED HEALTH CARE EDUCATION/TRAINING PROGRAM

## 2020-01-22 PROCEDURE — 80061 LIPID PANEL: CPT | Mod: NTX

## 2020-01-22 PROCEDURE — 86803 HEPATITIS C AB TEST: CPT | Mod: NTX

## 2020-01-22 PROCEDURE — 82248 BILIRUBIN DIRECT: CPT | Mod: NTX

## 2020-01-22 PROCEDURE — 81001 URINALYSIS AUTO W/SCOPE: CPT | Mod: NTX

## 2020-01-22 PROCEDURE — 63600367 HC NITRIC OXIDE PER HOUR: Mod: NTX

## 2020-01-22 PROCEDURE — 99232 PR SUBSEQUENT HOSPITAL CARE,LEVL II: ICD-10-PCS | Mod: NTX,,, | Performed by: NURSE PRACTITIONER

## 2020-01-22 PROCEDURE — 86665 EPSTEIN-BARR CAPSID VCA: CPT | Mod: NTX

## 2020-01-22 PROCEDURE — 82728 ASSAY OF FERRITIN: CPT | Mod: NTX

## 2020-01-22 PROCEDURE — 86703 HIV-1/HIV-2 1 RESULT ANTBDY: CPT | Mod: NTX

## 2020-01-22 PROCEDURE — 86901 BLOOD TYPING SEROLOGIC RH(D): CPT | Mod: NTX

## 2020-01-22 PROCEDURE — 63600367 HC NITRIC OXIDE PER HOUR

## 2020-01-22 PROCEDURE — 85730 THROMBOPLASTIN TIME PARTIAL: CPT | Mod: NTX

## 2020-01-22 PROCEDURE — 80307 DRUG TEST PRSMV CHEM ANLYZR: CPT | Mod: NTX

## 2020-01-22 PROCEDURE — 84153 ASSAY OF PSA TOTAL: CPT | Mod: NTX

## 2020-01-22 PROCEDURE — 63600175 PHARM REV CODE 636 W HCPCS: Performed by: STUDENT IN AN ORGANIZED HEALTH CARE EDUCATION/TRAINING PROGRAM

## 2020-01-22 PROCEDURE — 83880 ASSAY OF NATRIURETIC PEPTIDE: CPT | Mod: NTX

## 2020-01-22 RX ORDER — MAGNESIUM SULFATE HEPTAHYDRATE 40 MG/ML
2 INJECTION, SOLUTION INTRAVENOUS ONCE
Status: COMPLETED | OUTPATIENT
Start: 2020-01-22 | End: 2020-01-23

## 2020-01-22 RX ORDER — LANOLIN ALCOHOL/MO/W.PET/CERES
800 CREAM (GRAM) TOPICAL 2 TIMES DAILY
Status: DISCONTINUED | OUTPATIENT
Start: 2020-01-22 | End: 2020-02-06

## 2020-01-22 RX ORDER — POTASSIUM CHLORIDE 20 MEQ/1
40 TABLET, EXTENDED RELEASE ORAL ONCE
Status: COMPLETED | OUTPATIENT
Start: 2020-01-22 | End: 2020-01-22

## 2020-01-22 RX ADMIN — HYDRALAZINE HYDROCHLORIDE 50 MG: 50 TABLET ORAL at 10:01

## 2020-01-22 RX ADMIN — TRIAMCINOLONE ACETONIDE: 5 CREAM TOPICAL at 08:01

## 2020-01-22 RX ADMIN — ISOSORBIDE DINITRATE 10 MG: 10 TABLET ORAL at 10:01

## 2020-01-22 RX ADMIN — TRAMADOL HYDROCHLORIDE 50 MG: 50 TABLET, FILM COATED ORAL at 10:01

## 2020-01-22 RX ADMIN — HYDROXYZINE HYDROCHLORIDE 25 MG: 25 TABLET, FILM COATED ORAL at 12:01

## 2020-01-22 RX ADMIN — FUROSEMIDE 20 MG/HR: 10 INJECTION, SOLUTION INTRAMUSCULAR; INTRAVENOUS at 10:01

## 2020-01-22 RX ADMIN — Medication 800 MG: at 10:01

## 2020-01-22 RX ADMIN — STANDARDIZED SENNA CONCENTRATE AND DOCUSATE SODIUM 2 TABLET: 8.6; 5 TABLET ORAL at 10:01

## 2020-01-22 RX ADMIN — CETIRIZINE HYDROCHLORIDE 10 MG: 10 TABLET, FILM COATED ORAL at 08:01

## 2020-01-22 RX ADMIN — HEPARIN SODIUM AND DEXTROSE 16 UNITS/KG/HR: 10000; 5 INJECTION INTRAVENOUS at 10:01

## 2020-01-22 RX ADMIN — TRIAMCINOLONE ACETONIDE: 5 CREAM TOPICAL at 02:01

## 2020-01-22 RX ADMIN — Medication 6 MG: at 10:01

## 2020-01-22 RX ADMIN — TRIAMCINOLONE ACETONIDE: 5 CREAM TOPICAL at 10:01

## 2020-01-22 RX ADMIN — FUROSEMIDE 40 MG/HR: 10 INJECTION, SOLUTION INTRAMUSCULAR; INTRAVENOUS at 08:01

## 2020-01-22 RX ADMIN — ACETAMINOPHEN 1000 MG: 500 TABLET ORAL at 02:01

## 2020-01-22 RX ADMIN — DOBUTAMINE IN DEXTROSE 5 MCG/KG/MIN: 200 INJECTION, SOLUTION INTRAVENOUS at 03:01

## 2020-01-22 RX ADMIN — HEPARIN SODIUM AND DEXTROSE 16 UNITS/KG/HR: 10000; 5 INJECTION INTRAVENOUS at 03:01

## 2020-01-22 RX ADMIN — HYDROXYZINE HYDROCHLORIDE 25 MG: 25 TABLET, FILM COATED ORAL at 01:01

## 2020-01-22 RX ADMIN — MAGNESIUM SULFATE IN WATER 2 G: 40 INJECTION, SOLUTION INTRAVENOUS at 10:01

## 2020-01-22 RX ADMIN — HYDRALAZINE HYDROCHLORIDE 50 MG: 50 TABLET ORAL at 02:01

## 2020-01-22 RX ADMIN — ISOSORBIDE DINITRATE 10 MG: 10 TABLET ORAL at 05:01

## 2020-01-22 RX ADMIN — HYDRALAZINE HYDROCHLORIDE 50 MG: 50 TABLET ORAL at 05:01

## 2020-01-22 RX ADMIN — ISOSORBIDE DINITRATE 10 MG: 10 TABLET ORAL at 02:01

## 2020-01-22 RX ADMIN — ACETAMINOPHEN 1000 MG: 500 TABLET ORAL at 10:01

## 2020-01-22 RX ADMIN — HYDROXYZINE HYDROCHLORIDE 25 MG: 25 TABLET, FILM COATED ORAL at 10:01

## 2020-01-22 RX ADMIN — ATORVASTATIN CALCIUM 40 MG: 20 TABLET, FILM COATED ORAL at 08:01

## 2020-01-22 RX ADMIN — POTASSIUM CHLORIDE 40 MEQ: 1500 TABLET, EXTENDED RELEASE ORAL at 07:01
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
## 2020-01-22 NOTE — PT/OT/SLP PROGRESS
Physical Therapy      Patient Name:  Saba Lott   MRN:  [id number]    PT orders received and acknowledged. Pt with femoral IABP removal ~12:15 PM this date. Per protocol of Rehab Services, pt with 6 hr hold following IABP removal. PT unable to follow up at later time after hold lifted. Will follow-up at next possible date pending pt medical status.    Maren Barnes, PT, DPT  1/22/2020  [phone]

## 2020-01-22 NOTE — PROGRESS NOTES
EDUCATION NOTE:    Saba Lott was seen today for pre-heart transplant education. Patient signed wellness contract and informed consent to undergo heart transplant evaluation work-up. Thorough pre-transplant education conducted.      Information presented included:  · Evaluation process  · Members of the transplant team  · Selection committee members and role of the committee  · Listing process for transplant  · Different listing designations, including status 7  · 1-year graft survival statistics  · LVAD as bridge to transplant or DT  · Need to reach patient within 15 minutes of donor offer  · CDC high risk donors  · Blood transfusions  · Process for matching donor with recipient  · Need for weight loss and how it relates to the wait time  · Post-transplant immunosuppression for life with need to be able to afford post-transplant medications  · Need for a caregiver to be with them at all times beginning with discharge from ICU, through at least the first 6 weeks post-transplant  · Need to find local housing for the first 6 weeks post-transplant  · How to reach team members at any time  · [employer] website with written instructions regarding how to look up information specific to [facility]  · Use of Hepatitis C organs    Patient was not accompanied by anyone. Patient asked pertinent questions, which were answered to their satisfaction. Patient was also given a copy of the wellness contract and informed consent to undergo evaluation work-up.
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
## 2020-01-22 NOTE — TELEPHONE ENCOUNTER
Request patient be moved to step 3 of the Pathway. Informed Mickey LLAMAS NP that patient stated was not sure where colonoscopy was done and don't recall when it was done.

## 2020-01-22 NOTE — PROGRESS NOTES
Pt AAAO, no family at bedside.  Assisted pt to get his glasses so he can read about the VAD.  He said he will read it tonight.  Will follow up with him tomorrow. No questions for me at this time.

## 2020-01-22 NOTE — PT/OT/SLP PROGRESS
Occupational Therapy      Patient Name:  Saba Lott   MRN:  [id number]    Patient with IABP in groin and appropriate for one discipline with PT to follow. However, pt with possible removal of IABP this date which would allow for further mobility/ADL skills. OT to check pts status next date.     KARLY Castillo  1/22/2020

## 2020-01-22 NOTE — TELEPHONE ENCOUNTER
Returned call to Brianne Talavera, Radiology department with [facility]. States no record of LHC.  Spoke to Shante with medical records, [facility]. States records go back to 2012. No LHC or colonoscopy found.  Today patient stated wasn't sure where colonoscopy was done.

## 2020-01-22 NOTE — HPI
This is a 54-year-old gentleman who presented with acute on chronic systolic and diastolic heart failure with cardiogenic shock.  Patient was worked up and placement of intra-aortic balloon pump along with extensive diuresis with multiple inotropes and nitric oxide was carried out.  Patient was debilitated and had almost 3 weeks of hospitalization.  Patient was worked up and was found to be a candidate for a destination therapy LVAD.  Patient was offered different treatment options and different types of problems available.  Patient after understanding the risks and benefits and treatment options wanted to proceed with implantation of a HeartMate 3.  An informed consent was obtained.

## 2020-01-22 NOTE — PROGRESS NOTES
01/22/2020  Miko Rivera    Current provider:  Lian Daniel MD      I, Miko Rivera, rounded on Saba Lott to ensure all mechanical assist device settings (IABP or VAD) were appropriate and all parameters were within limits.  I was able to ensure all back up equipment was present, the staff had no issues, and the Perfusion Department daily rounding was complete.    12:33 PM

## 2020-01-22 NOTE — PLAN OF CARE
CMICU DAILY GOALS       A: Awake    RASS: Goal - RASS Goal: 0-->alert and calm  Actual - RASS ([place]illan Agitation-Sedation Scale): 0-->alert and calm   Restraint necessity: Clinical Justification: Removing medical devices  B: Breath   SBT: Not intubated   C: Coordinate A & B, analgesics/sedatives   Pain: managed    SAT: Not intubated  D: Delirium   CAM-ICU: Overall CAM-ICU: Negative  E: Early Mobility   MOVE Screen: Pass   Activity: Activity Management: bedrest maintained per order  FAS: Feeding/Nutrition   Diet order: Diet/Nutrition Received: low saturated fat/low cholesterol,   Fluid restriction: Fluid Requirement: 1500 cc FR  T: Thrombus   DVT prophylaxis: VTE Required Core Measure: Pharmacological prophylaxis initiated/maintained  H: HOB Elevation   Head of Bed (HOB): HOB at 15 degrees  U: Ulcer Prophylaxis   GI: yes  G: Glucose control   managed    S: Skin   Bundle compliance: yes   Bathing/Skin Care: back care, bath, chlorhexidine, bath, complete, bedtime care, incontinence care, linen changed Date: <No height available>-1  B: Bowel Function   no issues   I: Indwelling Catheters   Mc necessity:      Urethral Catheter 01/15/20 2030 Straight-tip 16 Fr.-Reason for Continuing Urinary Catheterization: Critically ill in ICU requiring intensive monitoring   CVC necessity: No   IPAD offered: Yes  D: De-escalation Antibx   Yes  Plan for the day   Wean balloon pump              Pt needs assistance with compliance   Family/Goals of care/Code Status   Code Status: Full Code     No acute events throughout the night, Balloon pump in place without alarms. Pt must be reminded about positioning and not removing the balloon pump dressing. Medication for itching administered. Heparin infusing per nomogram. Pt denies c/o pain and shortness of breath. Will continue to educate about compliance with central lines. VS and assessment per flow sheet, patient progressing towards goals as tolerated, plan of care reviewed with Saba  Oren, all concerns addressed, will continue to monitor.

## 2020-01-22 NOTE — PROGRESS NOTES
[facility]  Heart Transplant  Progress Note    Patient Name: Saba Lott  MRN: [id number]  Admission Date: 1/15/2020  Hospital Length of Stay: 7 days  Attending Physician: Lian Daniel MD  Primary Care Provider: Primary Doctor No  Principal Problem:Cardiogenic shock    Subjective:     Interval History: No events overnight. CVP 9 this morning. IABP changed to 1:2.     Continuous Infusions:   DOBUTamine 5 mcg/kg/min (01/22/20 1000)    furosemide (LASIX) 10 mg/mL infusion (non-titrating) 20 mg/hr (01/22/20 1000)    heparin (porcine) in D5W 16 Units/kg/hr (01/22/20 1000)    nitric oxide gas       Scheduled Meds:   acetaminophen  1,000 mg Oral Q8H    atorvastatin  40 mg Oral Daily    cetirizine  10 mg Oral Daily    famotidine  40 mg Oral Daily    hydrALAZINE  50 mg Oral Q8H    isosorbide dinitrate  10 mg Oral Q8H    magnesium oxide  800 mg Oral BID    magnesium sulfate IVPB  1 g Intravenous Once    melatonin  6 mg Oral Nightly    polyethylene glycol  17 g Oral Daily    senna-docusate 8.6-50 mg  2 tablet Oral BID    triamcinolone acetonide 0.5%   Topical (Top) TID     PRN Meds:albuterol, bisacodyl, heparin (PORCINE), heparin (PORCINE), hydrOXYzine HCl, sodium chloride 0.9%, traMADol    Review of patient's allergies indicates:   Allergen Reactions    Iodine and iodide containing products      Objective:     Vital Signs (Most Recent):  Temp: 98 °F (36.7 °C) (01/22/20 0701)  Pulse: (!) 123 (01/22/20 1000)  Resp: 14 (01/22/20 1000)  BP: 120/74 (01/22/20 1000)  SpO2: (!) 91 % (01/22/20 1000) Vital Signs (24h Range):  Temp:  [97.8 °F (36.6 °C)-98 °F (36.7 °C)] 98 °F (36.7 °C)  Pulse:  [103-125] 123  Resp:  [0-29] 14  SpO2:  [89 %-96 %] 91 %  BP: (111-146)/(51-88) 120/74     Patient Vitals for the past 72 hrs (Last 3 readings):   Weight   01/22/20 0300 94 kg (207 lb 3.7 oz)   01/21/20 0300 102.2 kg (225 lb 5 oz)   01/20/20 0701 108 kg (238 lb 1.6 oz)     Body mass index is 32.46  kg/m².      Intake/Output Summary (Last 24 hours) at 1/22/2020 1058  Last data filed at 1/22/2020 1000  Gross per 24 hour   Intake 1126.58 ml   Output 20139 ml   Net -9238.42 ml       Hemodynamic Parameters:       Telemetry: NSR    Physical Exam  Constitutional: He is oriented to person, place, and time. He appears well-developed and well-nourished.   HENT:   Head: Normocephalic and atraumatic.   Eyes: Pupils are equal, round, and reactive to light. Conjunctivae and EOM are normal.   Neck: Normal range of motion. Neck supple. No thyromegaly present.   Cardiovascular: Normal rate and regular rhythm.   + S3   Pulmonary/Chest: Effort normal and breath sounds normal.   Abdominal: Soft. Bowel sounds are normal.   Musculoskeletal:   Edema present     Neurological: He is alert and oriented to person, place, and time.   Skin: Skin is warm and dry. Capillary refill takes 2 to 3 seconds.   Psychiatric: He has a normal mood and affect. His behavior is normal. Judgment and thought content normal  Significant Labs:  CBC:  Recent Labs   Lab 01/20/20  0235 01/21/20  0352 01/22/20  0433   WBC 9.96 10.02 9.92   RBC 4.17* 4.32* 4.36*   HGB 11.3* 11.4* 11.7*   HCT 35.4* 36.3* 36.2*   * 121* 129*   MCV 85 84 83   MCH 27.1 26.4* 26.8*   MCHC 31.9* 31.4* 32.3     BNP:  Recent Labs   Lab 01/16/20  1106 01/17/20  0424 01/21/20  0755   BNP 2,534* 1,851* 3,053*     CMP:  Recent Labs   Lab 01/21/20  1205 01/21/20  1637 01/22/20  0433   * 164* 289*  289*   CALCIUM 9.7 9.6 9.8  9.8   ALBUMIN 3.3* 3.3* 3.3*   PROT 7.4 7.3 7.5    135* 129*  129*   K 3.8 3.8 3.5  3.5   CO2 38* 39* 39*  39*   CL 85* 85* 77*  77*   BUN 18 20 20  20   CREATININE 1.6* 1.8* 1.7*  1.7*   ALKPHOS 114 117 111   ALT 17 18 16   AST 28 29 27   BILITOT 1.2* 1.2* 1.3*      Coagulation:   Recent Labs   Lab 01/15/20  2001  01/20/20  0235 01/21/20  0352 01/22/20  0433   INR 2.0*  --   --   --   --    APTT 26.1   < > 59.2* 49.5* 45.4*    < > = values in  this interval not displayed.     LDH:  No results for input(s): LDH in the last 72 hours.  Microbiology:  Microbiology Results (last 7 days)     ** No results found for the last 168 hours. **          I have reviewed all pertinent labs within the past 24 hours.    Estimated Creatinine Clearance: 53.7 mL/min (A) (based on SCr of 1.7 mg/dL (H)).    Diagnostic Results:  I have reviewed and interpreted all pertinent imaging results/findings within the past 24 hours.    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 Chronic MARC - Class II-III and mild LE edema.      Hospital Course (synopsis of major diagnoses, care, treatment, and services provided during the course of the hospital stay):  -2/12 admit to CDU for diuresis with IV lasix  -IV abx   -CXR with suspected small left pleural effusion with associated left basilar atelectasis versus evolving airspace disease  -2/13 single chamber ICD implant; IV lasix decreased to BID  -2/16 add dobutamine, hold BB due to hypotension, no ACE-I or ARB due to recent HPI hypotension  -2/17 Lasix changed to PO  -2/18 dobutamine discontinued    Admitted to [facility] on Thursday due to severe SOB for a week with associated orthopnea, MARC, and PND unable to lie flat and found to be in acute diastolic heart failure. States he normally weighs around 219 but up to 254lb on admission tonight. Says he hasn't been the most compliant in terms of fluid restriction and daily weights but has avoided salt. BNP on admission was 2375. BUN/CRT 32/1.4 He was started on IV diuretics but continued to deteriorate. Creatinine continued to increase and reached 4.3 with oliguria. He was started on CRRT for a few days and tolerated well. Renal u/s was negative for obstruction and liver u/s without findings of  cirrhosis. All of this was progression of cardiorenal syndrome with liver congestion from severe volume overload. On arrival vitals stable and in no acute distress. He has obvious anasarca up to the chest. He did have uop of 500 at time of arrival also.    TTE 5/28/19  · Moderate left ventricular enlargement.  · Severely decreased left ventricular systolic function. The estimated ejection fraction is 20%  · Global hypokinetic wall motion.  · Grade III (severe) left ventricular diastolic dysfunction consistent with restrictive physiology.  · Severe left atrial enlargement.  · Moderate right ventricular enlargement.  · Low normal right ventricular systolic function.  · Mild right atrial enlargement.  · Moderate mitral regurgitation.  Mild to moderate tricuspid regurgitation    * Cardiogenic shock  -NICM; Likely Etoh induced  -Last 2D Echo 1/16/20: LVEF 20%, LVEDD 6.92 cm   -Transferred from [facility] with IABP at 1:1 for further level of care  - CVP 9, SVO2 86, CO/CI 20/9.5 while on Dobutamine @5, Radha 10 and IABP 1:1. Decrease Radha to 5 , decrease IABP 1:2,lasix to 20 mg.plan to dc balloon by this afternoon  - Cont hydralazine and isordil         Pruritus  -triamcinolone cream, atarax, Zyrtec and Famotidine   -improving    ANJELICA (acute kidney injury)  - cr trending down with diuresis     Essential hypertension  -Currently essentially normotensive   -Continue Hyd/Isordil    Deep vein thrombosis (DVT) of right lower extremity  -Unsure of timing but was on eliquis PTA.   -Continue on heparin gtt for now      AICD (automatic cardioverter/defibrillator) present  -Medtronic placed 2019 for primary prevention        Asad Westbrook MD  Heart Transplant  [facility]

## 2020-01-22 NOTE — PLAN OF CARE
CMICU DAILY GOALS       A: Awake    RASS: Goal - RASS Goal: 0-->alert and calm  Actual - RASS (Mcmillan Agitation-Sedation Scale): 0-->alert and calm   Restraint necessity: Clinical Justification: Removing medical devices  B: Breath   SBT: Not intubated   C: Coordinate A & B, analgesics/sedatives   Pain: managed    SAT: Not intubated  D: Delirium   CAM-ICU: Overall CAM-ICU: Negative  E: Early Mobility   MOVE Screen: Fail   Activity: Activity Management: bedrest maintained per order  FAS: Feeding/Nutrition   Diet order: Diet/Nutrition Received: 2 gram sodium, low saturated fat/low cholesterol, restrict fluids,   Fluid restriction: Fluid Requirement: 1500 cc FR  T: Thrombus   DVT prophylaxis: VTE Required Core Measure: Pharmacological prophylaxis initiated/maintained  H: HOB Elevation   Head of Bed (HOB): HOB at 15 degrees  U: Ulcer Prophylaxis   GI: yes  G: Glucose control   managed    S: Skin   Bundle compliance: yes   Bathing/Skin Care: incontinence care, linen changed Date: PM Bath  B: Bowel Function   no issues   I: Indwelling Catheters   Mc necessity:      Urethral Catheter 01/15/20 2030 Straight-tip 16 Fr.-Reason for Continuing Urinary Catheterization: Critically ill in ICU requiring intensive monitoring   CVC necessity: Yes   IPAD offered: Yes  D: De-escalation Antibx   No  Plan for the day   Continue to try and diurese pt until CVP's get down to the single digits, at which point the balloon pump can possibly be pulled. Also took pt for Head/Abd/Pelvis CT for routine LVAD workup.  Family/Goals of care/Code Status   Code Status: Full Code     VS and assessment per flow sheet, patient progressing towards goals as tolerated, plan of care reviewed with Saba Lott and family, all concerns addressed, will continue to monitor.

## 2020-01-22 NOTE — ASSESSMENT & PLAN NOTE
CT does not preclude patient from advanced options. Abdominal Hernia noted on CT. Continue on pathway. Dr. Joshi to review and staff.

## 2020-01-22 NOTE — SUBJECTIVE & OBJECTIVE
Interval History: No events overnight. CVP 9 this morning. IABP changed to 1:2.     Continuous Infusions:   DOBUTamine 5 mcg/kg/min (01/22/20 1000)    furosemide (LASIX) 10 mg/mL infusion (non-titrating) 20 mg/hr (01/22/20 1000)    heparin (porcine) in D5W 16 Units/kg/hr (01/22/20 1000)    nitric oxide gas       Scheduled Meds:   acetaminophen  1,000 mg Oral Q8H    atorvastatin  40 mg Oral Daily    cetirizine  10 mg Oral Daily    famotidine  40 mg Oral Daily    hydrALAZINE  50 mg Oral Q8H    isosorbide dinitrate  10 mg Oral Q8H    magnesium oxide  800 mg Oral BID    magnesium sulfate IVPB  1 g Intravenous Once    melatonin  6 mg Oral Nightly    polyethylene glycol  17 g Oral Daily    senna-docusate 8.6-50 mg  2 tablet Oral BID    triamcinolone acetonide 0.5%   Topical (Top) TID     PRN Meds:albuterol, bisacodyl, heparin (PORCINE), heparin (PORCINE), hydrOXYzine HCl, sodium chloride 0.9%, traMADol    Review of patient's allergies indicates:   Allergen Reactions    Iodine and iodide containing products      Objective:     Vital Signs (Most Recent):  Temp: 98 °F (36.7 °C) (01/22/20 0701)  Pulse: (!) 123 (01/22/20 1000)  Resp: 14 (01/22/20 1000)  BP: 120/74 (01/22/20 1000)  SpO2: (!) 91 % (01/22/20 1000) Vital Signs (24h Range):  Temp:  [97.8 °F (36.6 °C)-98 °F (36.7 °C)] 98 °F (36.7 °C)  Pulse:  [103-125] 123  Resp:  [0-29] 14  SpO2:  [89 %-96 %] 91 %  BP: (111-146)/(51-88) 120/74     Patient Vitals for the past 72 hrs (Last 3 readings):   Weight   01/22/20 0300 94 kg (207 lb 3.7 oz)   01/21/20 0300 102.2 kg (225 lb 5 oz)   01/20/20 0701 108 kg (238 lb 1.6 oz)     Body mass index is 32.46 kg/m².      Intake/Output Summary (Last 24 hours) at 1/22/2020 1058  Last data filed at 1/22/2020 1000  Gross per 24 hour   Intake 1126.58 ml   Output 96208 ml   Net -9238.42 ml       Hemodynamic Parameters:       Telemetry: NSR    Physical Exam  Constitutional: He is oriented to person, place, and time. He  appears well-developed and well-nourished.   HENT:   Head: Normocephalic and atraumatic.   Eyes: Pupils are equal, round, and reactive to light. Conjunctivae and EOM are normal.   Neck: Normal range of motion. Neck supple. No thyromegaly present.   Cardiovascular: Normal rate and regular rhythm.   + S3   Pulmonary/Chest: Effort normal and breath sounds normal.   Abdominal: Soft. Bowel sounds are normal.   Musculoskeletal:   Edema present     Neurological: He is alert and oriented to person, place, and time.   Skin: Skin is warm and dry. Capillary refill takes 2 to 3 seconds.   Psychiatric: He has a normal mood and affect. His behavior is normal. Judgment and thought content normal  Significant Labs:  CBC:  Recent Labs   Lab 01/20/20  0235 01/21/20  0352 01/22/20  0433   WBC 9.96 10.02 9.92   RBC 4.17* 4.32* 4.36*   HGB 11.3* 11.4* 11.7*   HCT 35.4* 36.3* 36.2*   * 121* 129*   MCV 85 84 83   MCH 27.1 26.4* 26.8*   MCHC 31.9* 31.4* 32.3     BNP:  Recent Labs   Lab 01/16/20  1106 01/17/20  0424 01/21/20  0755   BNP 2,534* 1,851* 3,053*     CMP:  Recent Labs   Lab 01/21/20  1205 01/21/20  1637 01/22/20  0433   * 164* 289*  289*   CALCIUM 9.7 9.6 9.8  9.8   ALBUMIN 3.3* 3.3* 3.3*   PROT 7.4 7.3 7.5    135* 129*  129*   K 3.8 3.8 3.5  3.5   CO2 38* 39* 39*  39*   CL 85* 85* 77*  77*   BUN 18 20 20  20   CREATININE 1.6* 1.8* 1.7*  1.7*   ALKPHOS 114 117 111   ALT 17 18 16   AST 28 29 27   BILITOT 1.2* 1.2* 1.3*      Coagulation:   Recent Labs   Lab 01/15/20  2001  01/20/20  0235 01/21/20  0352 01/22/20  0433   INR 2.0*  --   --   --   --    APTT 26.1   < > 59.2* 49.5* 45.4*    < > = values in this interval not displayed.     LDH:  No results for input(s): LDH in the last 72 hours.  Microbiology:  Microbiology Results (last 7 days)     ** No results found for the last 168 hours. **          I have reviewed all pertinent labs within the past 24 hours.    Estimated Creatinine Clearance: 53.7 mL/min  (A) (based on SCr of 1.7 mg/dL (H)).    Diagnostic Results:  I have reviewed and interpreted all pertinent imaging results/findings within the past 24 hours.

## 2020-01-22 NOTE — PROGRESS NOTES
Dr. Ruano and HTS at bedside. POC to D/C IABP. IABP placed in 1:2, will modify 1:3 @ 1030. Will obtain CVP and SVO2. Nitric will be decreased to 5, RT notified. Lasix also decreased to 20.

## 2020-01-22 NOTE — NURSING
Pt returned from CT with PARKER Huerta, PARKER Lund, Kelechi from RT, and Joanne PCT. No acute events during transport.

## 2020-01-23 ENCOUNTER — CLINICAL SUPPORT (OUTPATIENT)
Dept: CARDIOLOGY | Facility: CLINIC | Age: 55
DRG: 001 | End: 2020-01-23
Attending: INTERNAL MEDICINE
Payer: MEDICARE

## 2020-01-23 PROBLEM — R73.9 ACUTE HYPERGLYCEMIA: Status: ACTIVE | Noted: 2020-01-23

## 2020-01-23 PROBLEM — E44.0 MODERATE MALNUTRITION: Status: ACTIVE | Noted: 2020-01-23

## 2020-01-23 LAB
ALBUMIN SERPL BCP-MCNC: 3.3 G/DL (ref 3.5–5.2)
ALBUMIN SERPL BCP-MCNC: 3.5 G/DL (ref 3.5–5.2)
ALLENS TEST: ABNORMAL
ALLENS TEST: ABNORMAL
ALP SERPL-CCNC: 106 U/L (ref 55–135)
ALP SERPL-CCNC: 107 U/L (ref 55–135)
ALT SERPL W/O P-5'-P-CCNC: 17 U/L (ref 10–44)
ALT SERPL W/O P-5'-P-CCNC: 18 U/L (ref 10–44)
ANION GAP SERPL CALC-SCNC: 13 MMOL/L (ref 8–16)
ANION GAP SERPL CALC-SCNC: 17 MMOL/L (ref 8–16)
ANION GAP SERPL CALC-SCNC: 17 MMOL/L (ref 8–16)
APTT BLDCRRT: 47.2 SEC (ref 21–32)
ASCENDING AORTA: 3.11 CM
AST SERPL-CCNC: 28 U/L (ref 10–40)
AST SERPL-CCNC: 31 U/L (ref 10–40)
BASOPHILS # BLD AUTO: 0.03 K/UL (ref 0–0.2)
BASOPHILS NFR BLD: 0.3 % (ref 0–1.9)
BILIRUB SERPL-MCNC: 1.1 MG/DL (ref 0.1–1)
BILIRUB SERPL-MCNC: 1.4 MG/DL (ref 0.1–1)
BSA FOR ECHO PROCEDURE: 2.04 M2
BUN SERPL-MCNC: 22 MG/DL (ref 6–20)
BUN SERPL-MCNC: 22 MG/DL (ref 6–20)
BUN SERPL-MCNC: 25 MG/DL (ref 6–20)
CALCIUM SERPL-MCNC: 9.5 MG/DL (ref 8.7–10.5)
CALCIUM SERPL-MCNC: 9.8 MG/DL (ref 8.7–10.5)
CALCIUM SERPL-MCNC: 9.8 MG/DL (ref 8.7–10.5)
CHLORIDE SERPL-SCNC: 76 MMOL/L (ref 95–110)
CHLORIDE SERPL-SCNC: 76 MMOL/L (ref 95–110)
CHLORIDE SERPL-SCNC: 79 MMOL/L (ref 95–110)
CO2 SERPL-SCNC: 37 MMOL/L (ref 23–29)
CO2 SERPL-SCNC: 37 MMOL/L (ref 23–29)
CO2 SERPL-SCNC: 39 MMOL/L (ref 23–29)
CREAT SERPL-MCNC: 1.8 MG/DL (ref 0.5–1.4)
CRP SERPL-MCNC: 20.9 MG/L (ref 0–8.2)
CV ECHO LV RWT: 0.23 CM
DELSYS: ABNORMAL
DIFFERENTIAL METHOD: ABNORMAL
DOP CALC LVOT AREA: 3.8 CM2
DOP CALC LVOT DIAMETER: 2.19 CM
DOP CALC LVOT PEAK VEL: 0.56 M/S
DOP CALC LVOT STROKE VOLUME: 28.69 CM3
DOP CALCLVOT PEAK VEL VTI: 7.62 CM
E/E' RATIO: 19.45 M/S
ECHO LV POSTERIOR WALL: 0.73 CM (ref 0.6–1.1)
EOSINOPHIL # BLD AUTO: 0.2 K/UL (ref 0–0.5)
EOSINOPHIL NFR BLD: 1.9 % (ref 0–8)
ERYTHROCYTE [DISTWIDTH] IN BLOOD BY AUTOMATED COUNT: 17.2 % (ref 11.5–14.5)
EST. GFR  (AFRICAN AMERICAN): 47.9 ML/MIN/1.73 M^2
EST. GFR  (NON AFRICAN AMERICAN): 41.4 ML/MIN/1.73 M^2
FACT VIII ACT/NOR PPP: 182 % (ref 60–170)
FIBRINOGEN PPP-MCNC: 463 MG/DL (ref 182–366)
FRACTIONAL SHORTENING: 8 % (ref 28–44)
GLUCOSE SERPL-MCNC: 213 MG/DL (ref 70–110)
GLUCOSE SERPL-MCNC: 281 MG/DL (ref 70–110)
GLUCOSE SERPL-MCNC: 281 MG/DL (ref 70–110)
HBV CORE AB SERPL QL IA: NEGATIVE
HBV SURFACE AB SER-ACNC: NEGATIVE M[IU]/ML
HBV SURFACE AG SERPL QL IA: NEGATIVE
HCO3 UR-SCNC: 46.2 MMOL/L (ref 24–28)
HCO3 UR-SCNC: 49.2 MMOL/L (ref 24–28)
HCT VFR BLD AUTO: 36.3 % (ref 40–54)
HCV AB SERPL QL IA: NEGATIVE
HCYS SERPL-SCNC: 26.3 UMOL/L (ref 4–16.5)
HEPATITIS A ANTIBODY, IGG: POSITIVE
HGB BLD-MCNC: 12.1 G/DL (ref 14–18)
HIV 1+2 AB+HIV1 P24 AG SERPL QL IA: NEGATIVE
IMM GRANULOCYTES # BLD AUTO: 0.06 K/UL (ref 0–0.04)
IMM GRANULOCYTES NFR BLD AUTO: 0.6 % (ref 0–0.5)
INR PPP: 1.4 (ref 0.8–1.2)
INTERVENTRICULAR SEPTUM: 0.76 CM (ref 0.6–1.1)
LA MAJOR: 6 CM
LA MINOR: 5.93 CM
LA WIDTH: 4.75 CM
LEFT ABI: 0.89
LEFT ATRIUM SIZE: 4.34 CM
LEFT ATRIUM VOLUME INDEX: 52.2 ML/M2
LEFT ATRIUM VOLUME: 104.52 CM3
LEFT CBA DIAS: 16 CM/S
LEFT CBA SYS: 45 CM/S
LEFT CCA DIST DIAS: 15 CM/S
LEFT CCA DIST SYS: 52 CM/S
LEFT CCA MID DIAS: 16 CM/S
LEFT CCA MID SYS: 47 CM/S
LEFT CCA PROX DIAS: 14 CM/S
LEFT CCA PROX SYS: 39 CM/S
LEFT DORSALIS PEDIS: 100 MMHG
LEFT ECA DIAS: 13 CM/S
LEFT ECA SYS: 50 CM/S
LEFT ICA DIST DIAS: 23 CM/S
LEFT ICA DIST SYS: 44 CM/S
LEFT ICA MID DIAS: 20 CM/S
LEFT ICA MID SYS: 48 CM/S
LEFT ICA PROX DIAS: 15 CM/S
LEFT ICA PROX SYS: 33 CM/S
LEFT INTERNAL DIMENSION IN SYSTOLE: 5.92 CM (ref 2.1–4)
LEFT POSTERIOR TIBIAL: 109 MMHG
LEFT VENTRICLE DIASTOLIC VOLUME INDEX: 106.32 ML/M2
LEFT VENTRICLE DIASTOLIC VOLUME: 212.68 ML
LEFT VENTRICLE MASS INDEX: 97 G/M2
LEFT VENTRICLE SYSTOLIC VOLUME INDEX: 87.4 ML/M2
LEFT VENTRICLE SYSTOLIC VOLUME: 174.89 ML
LEFT VENTRICULAR INTERNAL DIMENSION IN DIASTOLE: 6.46 CM (ref 3.5–6)
LEFT VENTRICULAR MASS: 194.35 G
LEFT VERTEBRAL DIAS: 13 CM/S
LEFT VERTEBRAL SYS: 28 CM/S
LV LATERAL E/E' RATIO: 17.83 M/S
LV SEPTAL E/E' RATIO: 21.4 M/S
LYMPHOCYTES # BLD AUTO: 1.5 K/UL (ref 1–4.8)
LYMPHOCYTES NFR BLD: 14.9 % (ref 18–48)
MAGNESIUM SERPL-MCNC: 2 MG/DL (ref 1.6–2.6)
MAGNESIUM SERPL-MCNC: 2.2 MG/DL (ref 1.6–2.6)
MCH RBC QN AUTO: 27.5 PG (ref 27–31)
MCHC RBC AUTO-ENTMCNC: 33.3 G/DL (ref 32–36)
MCV RBC AUTO: 83 FL (ref 82–98)
METHEMOGLOBIN: 0.5 % (ref 0–3)
MONOCYTES # BLD AUTO: 0.8 K/UL (ref 0.3–1)
MONOCYTES NFR BLD: 7.8 % (ref 4–15)
MV PEAK E VEL: 1.07 M/S
NEUTROPHILS # BLD AUTO: 7.7 K/UL (ref 1.8–7.7)
NEUTROPHILS NFR BLD: 74.5 % (ref 38–73)
NRBC BLD-RTO: 0 /100 WBC
OHS CV CAROTID RIGHT ICA EDV HIGHEST: 28
OHS CV CAROTID ULTRASOUND LEFT ICA/CCA RATIO: 0.92
OHS CV CAROTID ULTRASOUND RIGHT ICA/CCA RATIO: 1.08
OHS CV PV CAROTID LEFT HIGHEST CCA: 52
OHS CV PV CAROTID LEFT HIGHEST ICA: 48
OHS CV PV CAROTID RIGHT HIGHEST CCA: 50
OHS CV PV CAROTID RIGHT HIGHEST ICA: 54
OHS CV US CAROTID LEFT HIGHEST EDV: 23
PCO2 BLDA: 59.2 MMHG (ref 35–45)
PCO2 BLDA: 60.8 MMHG (ref 35–45)
PH SMN: 7.5 [PH] (ref 7.35–7.45)
PH SMN: 7.52 [PH] (ref 7.35–7.45)
PHOSPHATE SERPL-MCNC: 4.7 MG/DL (ref 2.7–4.5)
PLATELET # BLD AUTO: 184 K/UL (ref 150–350)
PLATELET FUNCTION ASSAY - EPINEPHRINE: 179 SECS (ref 76–199)
PMV BLD AUTO: 10.3 FL (ref 9.2–12.9)
PO2 BLDA: 36 MMHG (ref 40–60)
PO2 BLDA: 37 MMHG (ref 40–60)
POC BE: 23 MMOL/L
POC BE: 26 MMOL/L
POC SATURATED O2: 72 % (ref 95–100)
POC SATURATED O2: 73 % (ref 95–100)
POC TCO2: 48 MMOL/L (ref 24–29)
POC TCO2: >50 MMOL/L (ref 24–29)
POCT GLUCOSE: 120 MG/DL (ref 70–110)
POCT GLUCOSE: 158 MG/DL (ref 70–110)
POCT GLUCOSE: 254 MG/DL (ref 70–110)
POTASSIUM SERPL-SCNC: 3.8 MMOL/L (ref 3.5–5.1)
POTASSIUM SERPL-SCNC: 3.8 MMOL/L (ref 3.5–5.1)
POTASSIUM SERPL-SCNC: 3.9 MMOL/L (ref 3.5–5.1)
PROT SERPL-MCNC: 7.7 G/DL (ref 6–8.4)
PROT SERPL-MCNC: 7.8 G/DL (ref 6–8.4)
PROTHROMBIN TIME: 13.9 SEC (ref 9–12.5)
PULM VEIN S/D RATIO: 0.39
PV PEAK D VEL: 0.51 M/S
PV PEAK S VEL: 0.2 M/S
RA MAJOR: 5.63 CM
RA PRESSURE: 15 MMHG
RA WIDTH: 5.32 CM
RBC # BLD AUTO: 4.4 M/UL (ref 4.6–6.2)
RIGHT ABI: 0.86
RIGHT ARM BP: 123 MMHG
RIGHT ARM DIASTOLIC BLOOD PRESSURE: 68 MMHG
RIGHT ARM SYSTOLIC BLOOD PRESSURE: 123 MMHG
RIGHT CBA DIAS: 14 CM/S
RIGHT CBA SYS: 35 CM/S
RIGHT CCA DIST DIAS: 16 CM/S
RIGHT CCA DIST SYS: 50 CM/S
RIGHT CCA MID DIAS: 15 CM/S
RIGHT CCA MID SYS: 50 CM/S
RIGHT CCA PROX DIAS: 11 CM/S
RIGHT CCA PROX SYS: 39 CM/S
RIGHT DORSALIS PEDIS: 102 MMHG
RIGHT ECA DIAS: 9 CM/S
RIGHT ECA SYS: 50 CM/S
RIGHT ICA DIST DIAS: 23 CM/S
RIGHT ICA DIST SYS: 48 CM/S
RIGHT ICA MID DIAS: 28 CM/S
RIGHT ICA MID SYS: 54 CM/S
RIGHT ICA PROX DIAS: 16 CM/S
RIGHT ICA PROX SYS: 36 CM/S
RIGHT POSTERIOR TIBIAL: 106 MMHG
RIGHT VENTRICULAR END-DIASTOLIC DIMENSION: 5.59 CM
RIGHT VERTEBRAL DIAS: 11 CM/S
RIGHT VERTEBRAL SYS: 33 CM/S
RPR SER QL: NORMAL
SAMPLE: ABNORMAL
SAMPLE: ABNORMAL
SINUS: 3.63 CM
SITE: ABNORMAL
SITE: ABNORMAL
SODIUM SERPL-SCNC: 130 MMOL/L (ref 136–145)
SODIUM SERPL-SCNC: 130 MMOL/L (ref 136–145)
SODIUM SERPL-SCNC: 131 MMOL/L (ref 136–145)
STJ: 3.17 CM
TDI LATERAL: 0.06 M/S
TDI SEPTAL: 0.05 M/S
TDI: 0.06 M/S
TRICUSPID ANNULAR PLANE SYSTOLIC EXCURSION: 1.23 CM
TSH SERPL DL<=0.005 MIU/L-ACNC: 2.18 UIU/ML (ref 0.4–4)
WBC # BLD AUTO: 10.3 K/UL (ref 3.9–12.7)

## 2020-01-23 PROCEDURE — 93880 EXTRACRANIAL BILAT STUDY: CPT | Mod: NTX

## 2020-01-23 PROCEDURE — 86147 CARDIOLIPIN ANTIBODY EA IG: CPT | Mod: 59,NTX

## 2020-01-23 PROCEDURE — 82803 BLOOD GASES ANY COMBINATION: CPT | Mod: NTX

## 2020-01-23 PROCEDURE — 85397 CLOTTING FUNCT ACTIVITY: CPT | Mod: NTX

## 2020-01-23 PROCEDURE — 97116 GAIT TRAINING THERAPY: CPT | Mod: NTX

## 2020-01-23 PROCEDURE — 99291 PR CRITICAL CARE, E/M 30-74 MINUTES: ICD-10-PCS | Mod: NTX,,, | Performed by: INTERNAL MEDICINE

## 2020-01-23 PROCEDURE — 83090 ASSAY OF HOMOCYSTEINE: CPT | Mod: NTX

## 2020-01-23 PROCEDURE — 93922 UPR/L XTREMITY ART 2 LEVELS: CPT | Mod: 26,NTX,, | Performed by: INTERNAL MEDICINE

## 2020-01-23 PROCEDURE — 63600175 PHARM REV CODE 636 W HCPCS: Mod: NTX | Performed by: HOSPITALIST

## 2020-01-23 PROCEDURE — 93880 EXTRACRANIAL BILAT STUDY: CPT | Mod: 26,NTX,, | Performed by: INTERNAL MEDICINE

## 2020-01-23 PROCEDURE — 80053 COMPREHEN METABOLIC PANEL: CPT | Mod: 91,NTX

## 2020-01-23 PROCEDURE — 25000003 PHARM REV CODE 250: Mod: NTX | Performed by: HOSPITALIST

## 2020-01-23 PROCEDURE — 85301 ANTITHROMBIN III ANTIGEN: CPT | Mod: NTX

## 2020-01-23 PROCEDURE — 85384 FIBRINOGEN ACTIVITY: CPT | Mod: NTX

## 2020-01-23 PROCEDURE — 25000003 PHARM REV CODE 250: Mod: NTX | Performed by: STUDENT IN AN ORGANIZED HEALTH CARE EDUCATION/TRAINING PROGRAM

## 2020-01-23 PROCEDURE — 97162 PT EVAL MOD COMPLEX 30 MIN: CPT | Mod: NTX

## 2020-01-23 PROCEDURE — 85247 CLOT FACTOR VIII MULTIMETRIC: CPT | Mod: NTX

## 2020-01-23 PROCEDURE — 86977 RBC SERUM PRETX INCUBJ/INHIB: CPT | Mod: TXP

## 2020-01-23 PROCEDURE — 25000003 PHARM REV CODE 250: Mod: NTX | Performed by: NURSE PRACTITIONER

## 2020-01-23 PROCEDURE — 97802 MEDICAL NUTRITION INDIV IN: CPT | Mod: NTX

## 2020-01-23 PROCEDURE — 97530 THERAPEUTIC ACTIVITIES: CPT | Mod: NTX

## 2020-01-23 PROCEDURE — 99232 SBSQ HOSP IP/OBS MODERATE 35: CPT | Mod: NTX,,, | Performed by: NURSE PRACTITIONER

## 2020-01-23 PROCEDURE — 25000003 PHARM REV CODE 250: Mod: NTX | Performed by: INTERNAL MEDICINE

## 2020-01-23 PROCEDURE — 81376 HLA II TYPING 1 LOCUS LR: CPT | Mod: 91,TXP

## 2020-01-23 PROCEDURE — 99900035 HC TECH TIME PER 15 MIN (STAT): Mod: NTX

## 2020-01-23 PROCEDURE — 93922 CV US ANKLE BRACHIAL INDICES RESTING (CUPID ONLY): ICD-10-PCS | Mod: 26,NTX,, | Performed by: INTERNAL MEDICINE

## 2020-01-23 PROCEDURE — 20000000 HC ICU ROOM: Mod: NTX

## 2020-01-23 PROCEDURE — 27000221 HC OXYGEN, UP TO 24 HOURS: Mod: NTX

## 2020-01-23 PROCEDURE — 36415 COLL VENOUS BLD VENIPUNCTURE: CPT | Mod: NTX

## 2020-01-23 PROCEDURE — 81373 HLA I TYPING 1 LOCUS LR: CPT | Mod: 91,TXP

## 2020-01-23 PROCEDURE — 85610 PROTHROMBIN TIME: CPT | Mod: NTX

## 2020-01-23 PROCEDURE — 80053 COMPREHEN METABOLIC PANEL: CPT | Mod: NTX

## 2020-01-23 PROCEDURE — 63600175 PHARM REV CODE 636 W HCPCS: Mod: NTX | Performed by: STUDENT IN AN ORGANIZED HEALTH CARE EDUCATION/TRAINING PROGRAM

## 2020-01-23 PROCEDURE — 83735 ASSAY OF MAGNESIUM: CPT | Mod: 91,NTX

## 2020-01-23 PROCEDURE — 94761 N-INVAS EAR/PLS OXIMETRY MLT: CPT | Mod: NTX

## 2020-01-23 PROCEDURE — 63600367 HC NITRIC OXIDE PER HOUR: Mod: NTX

## 2020-01-23 PROCEDURE — 86829 HLA CLASS I/II ANTIBODY QUAL: CPT | Mod: TXP

## 2020-01-23 PROCEDURE — 99291 CRITICAL CARE FIRST HOUR: CPT | Mod: NTX,,, | Performed by: INTERNAL MEDICINE

## 2020-01-23 PROCEDURE — 97165 OT EVAL LOW COMPLEX 30 MIN: CPT | Mod: NTX

## 2020-01-23 PROCEDURE — 85246 CLOT FACTOR VIII VW ANTIGEN: CPT | Mod: NTX

## 2020-01-23 PROCEDURE — 86833 HLA CLASS II HIGH DEFIN QUAL: CPT | Mod: TXP

## 2020-01-23 PROCEDURE — 99232 PR SUBSEQUENT HOSPITAL CARE,LEVL II: ICD-10-PCS | Mod: NTX,,, | Performed by: NURSE PRACTITIONER

## 2020-01-23 PROCEDURE — 81376 HLA II TYPING 1 LOCUS LR: CPT | Mod: TXP

## 2020-01-23 PROCEDURE — 86832 HLA CLASS I HIGH DEFIN QUAL: CPT | Mod: TXP

## 2020-01-23 PROCEDURE — 93922 UPR/L XTREMITY ART 2 LEVELS: CPT | Mod: 50,NTX

## 2020-01-23 PROCEDURE — 85730 THROMBOPLASTIN TIME PARTIAL: CPT | Mod: NTX

## 2020-01-23 PROCEDURE — 84443 ASSAY THYROID STIM HORMONE: CPT | Mod: NTX

## 2020-01-23 PROCEDURE — 93880 CV US DOPPLER CAROTID (CUPID ONLY): ICD-10-PCS | Mod: 26,NTX,, | Performed by: INTERNAL MEDICINE

## 2020-01-23 PROCEDURE — 81373 HLA I TYPING 1 LOCUS LR: CPT | Mod: TXP

## 2020-01-23 PROCEDURE — 82955 ASSAY OF G6PD ENZYME: CPT | Mod: NTX

## 2020-01-23 PROCEDURE — 84100 ASSAY OF PHOSPHORUS: CPT | Mod: NTX

## 2020-01-23 PROCEDURE — 85025 COMPLETE CBC W/AUTO DIFF WBC: CPT | Mod: NTX

## 2020-01-23 PROCEDURE — 86140 C-REACTIVE PROTEIN: CPT | Mod: NTX

## 2020-01-23 PROCEDURE — 81240 F2 GENE: CPT | Mod: NTX

## 2020-01-23 PROCEDURE — 86825 HLA X-MATH NON-CYTOTOXIC: CPT | Mod: TXP

## 2020-01-23 PROCEDURE — 85240 CLOT FACTOR VIII AHG 1 STAGE: CPT | Mod: NTX

## 2020-01-23 PROCEDURE — 86828 HLA CLASS I&II ANTIBODY QUAL: CPT | Mod: 91,TXP

## 2020-01-23 PROCEDURE — 86480 TB TEST CELL IMMUN MEASURE: CPT | Mod: NTX

## 2020-01-23 PROCEDURE — 85576 BLOOD PLATELET AGGREGATION: CPT | Mod: NTX

## 2020-01-23 PROCEDURE — 86825 HLA X-MATH NON-CYTOTOXIC: CPT | Mod: 91,TXP

## 2020-01-23 RX ORDER — GLUCAGON 1 MG
1 KIT INJECTION
Status: DISCONTINUED | OUTPATIENT
Start: 2020-01-23 | End: 2020-02-06

## 2020-01-23 RX ORDER — IBUPROFEN 200 MG
16 TABLET ORAL
Status: DISCONTINUED | OUTPATIENT
Start: 2020-01-23 | End: 2020-02-06

## 2020-01-23 RX ORDER — POTASSIUM CHLORIDE 29.8 MG/ML
40 INJECTION INTRAVENOUS ONCE
Status: COMPLETED | OUTPATIENT
Start: 2020-01-23 | End: 2020-01-23

## 2020-01-23 RX ORDER — IBUPROFEN 200 MG
24 TABLET ORAL
Status: DISCONTINUED | OUTPATIENT
Start: 2020-01-23 | End: 2020-02-06

## 2020-01-23 RX ORDER — INSULIN ASPART 100 [IU]/ML
0-5 INJECTION, SOLUTION INTRAVENOUS; SUBCUTANEOUS
Status: DISCONTINUED | OUTPATIENT
Start: 2020-01-23 | End: 2020-02-06

## 2020-01-23 RX ORDER — POTASSIUM CHLORIDE 20 MEQ/1
40 TABLET, EXTENDED RELEASE ORAL 2 TIMES DAILY
Status: DISCONTINUED | OUTPATIENT
Start: 2020-01-23 | End: 2020-01-28

## 2020-01-23 RX ADMIN — Medication 800 MG: at 09:01

## 2020-01-23 RX ADMIN — TRIAMCINOLONE ACETONIDE: 5 CREAM TOPICAL at 02:01

## 2020-01-23 RX ADMIN — POTASSIUM CHLORIDE 40 MEQ: 400 INJECTION, SOLUTION INTRAVENOUS at 09:01

## 2020-01-23 RX ADMIN — ACETAMINOPHEN 1000 MG: 500 TABLET ORAL at 06:01

## 2020-01-23 RX ADMIN — CETIRIZINE HYDROCHLORIDE 10 MG: 10 TABLET, FILM COATED ORAL at 09:01

## 2020-01-23 RX ADMIN — HEPARIN SODIUM AND DEXTROSE 16 UNITS/KG/HR: 10000; 5 INJECTION INTRAVENOUS at 02:01

## 2020-01-23 RX ADMIN — TRIAMCINOLONE ACETONIDE: 5 CREAM TOPICAL at 09:01

## 2020-01-23 RX ADMIN — POTASSIUM CHLORIDE 40 MEQ: 1500 TABLET, EXTENDED RELEASE ORAL at 09:01

## 2020-01-23 RX ADMIN — DOBUTAMINE IN DEXTROSE 5 MCG/KG/MIN: 200 INJECTION, SOLUTION INTRAVENOUS at 10:01

## 2020-01-23 RX ADMIN — HYDRALAZINE HYDROCHLORIDE 50 MG: 50 TABLET ORAL at 02:01

## 2020-01-23 RX ADMIN — ISOSORBIDE DINITRATE 10 MG: 10 TABLET ORAL at 09:01

## 2020-01-23 RX ADMIN — ISOSORBIDE DINITRATE 10 MG: 10 TABLET ORAL at 06:01

## 2020-01-23 RX ADMIN — ACETAMINOPHEN 1000 MG: 500 TABLET ORAL at 02:01

## 2020-01-23 RX ADMIN — ISOSORBIDE DINITRATE 10 MG: 10 TABLET ORAL at 02:01

## 2020-01-23 RX ADMIN — ATORVASTATIN CALCIUM 40 MG: 20 TABLET, FILM COATED ORAL at 09:01

## 2020-01-23 RX ADMIN — INSULIN ASPART 1 UNITS: 100 INJECTION, SOLUTION INTRAVENOUS; SUBCUTANEOUS at 09:01

## 2020-01-23 RX ADMIN — HYDRALAZINE HYDROCHLORIDE 50 MG: 50 TABLET ORAL at 09:01

## 2020-01-23 RX ADMIN — CHLOROTHIAZIDE SODIUM 250 MG: 500 INJECTION, POWDER, LYOPHILIZED, FOR SOLUTION INTRAVENOUS at 05:01

## 2020-01-23 RX ADMIN — Medication 6 MG: at 09:01

## 2020-01-23 RX ADMIN — HYDRALAZINE HYDROCHLORIDE 50 MG: 50 TABLET ORAL at 06:01

## 2020-01-23 NOTE — CONSULTS
"  [facility]  Adult Nutrition  Consult Note    SUMMARY     Recommendations     1. Change diet to Diabetic 2000 kcal diet. 2. Add Boost Pudding TID.  Goals: 1. Pt's intake meals >75% x 7 days.  Nutrition Goal Status: goal met  Communication of RD Recs: reviewed with physician    Reason for Assessment    Reason For Assessment: consult  Diagnosis: other (see comments)(LVAD/heart Transplant; cardiogenic shock)  Relevant Medical History: nonischemic CMP, chronic HF s/p ICD implant, tobacco and ETOH abuse  Interdisciplinary Rounds: did not attend  General Information Comments: Pt with excellent intake meals since admissoin (%). Pt with chronic fluid shifts resulting in wt gain and wt loss; pt has 30+ lb fluid loss since admission (per pt, his UBW is 225 lbs). Per pt, he typically eats 1 larger meal at lunch and a light dinner, nothing else. Pt was able to describe low sodium diet he follows but was unaware of some high sodium foods. Completed NFPE today: pt has mild to moderate muscle wasting and fat depletion and meets criteria for chronic acute malnutrition.  Provided low sodium diet education with pamphlet.  Nutrition Discharge Planning: Pt to follow low sodium, low cholesterol, low fat diet.    Nutrition Risk Screen    Nutrition Risk Screen: no indicators present    Nutrition/Diet History    Spiritual, Cultural Beliefs, Jehovah's witness Practices, Values that Affect Care: no    Anthropometrics    Temp: 97.8 °F (36.6 °C)  Height Method: Estimated  Height: 5' 7" (170.2 cm)  Height (inches): 67 in  Weight Method: Bed Scale  Weight: 88.6 kg (195 lb 5.2 oz)  Weight (lb): 195.33 lb  Ideal Body Weight (IBW), Male: 148 lb  % Ideal Body Weight, Male (lb): 160.88 %  BMI (Calculated): 30.6       Lab/Procedures/Meds    Pertinent Labs Reviewed: reviewed  Pertinent Labs Comments: A1c 6.6%, Cl 76, CO2 37, BUN 22, CRP 20.9, Glu 281, Creat 1.8, BNP 2342  Pertinent Medications Reviewed: reviewed  Pertinent " Medications Comments: famotidine, statin, lasix    Estimated/Assessed Needs    Weight Used For Calorie Calculations: 88.6 kg (195 lb 5.2 oz)  Energy Calorie Requirements (kcal): 2100 kcal  Energy Need Method: [place]-St Jeor(PAL 1.25)  Protein Requirements: 89-107g/day  Weight Used For Protein Calculations: 88.6 kg (195 lb 5.2 oz)        RDA Method (mL): 2100         Nutrition Prescription Ordered    Current Diet Order: Diabetic 1500 kcal    Evaluation of Received Nutrient/Fluid Intake       % Intake of Estimated Energy Needs: 50 - 75 %  % Meal Intake: 75 - 100 %    Nutrition Risk    Level of Risk/Frequency of Follow-up: low     Assessment and Plan    Nutrition Problem:  Moderate Protein-Calorie Malnutrition  Malnutrition in the context of Chronic Illness/Injury    Related to (etiology):  Lack of appetite in setting of chronic heart failure.    Signs and Symptoms (as evidenced by):  Energy Intake: <75% of estimated energy requirement for 3+ months  Body Fat Depletion: mild to moderate depletion of orbitals, triceps   Muscle Mass Depletion: moderate depletion of hands, lower extremities      Interventions(treatment strategy):  Collaboration of care with providers.    Nutrition Diagnosis Status:  New       Monitor and Evaluation    Food and Nutrient Intake: energy intake, food and beverage intake  Food and Nutrient Adminstration: diet order  Knowledge/Beliefs/Attitudes: food and nutrition knowledge/skill  Anthropometric Measurements: weight, weight change, body mass index  Biochemical Data, Medical Tests and Procedures: electrolyte and renal panel, glucose/endocrine profile, gastrointestinal profile, inflammatory profile, lipid profile  Nutrition-Focused Physical Findings: overall appearance, extremities, muscles and bones, head and eyes, skin     Malnutrition Assessment             Energy Intake (Malnutrition): less than 75% for greater than or equal to 3 months   Orbital Region (Subcutaneous Fat Loss): mild  depletion  Upper Arm Region (Subcutaneous Fat Loss): moderate depletion  Thoracic and Lumbar Region: well nourished   [place] Region (Muscle Loss): well nourished  Clavicle Bone Region (Muscle Loss): well nourished  Clavicle and Acromion Bone Region (Muscle Loss): well nourished  Dorsal Hand (Muscle Loss): moderate depletion  Patellar Region (Muscle Loss): moderate depletion  Anterior Thigh Region (Muscle Loss): moderate depletion  Posterior Calf Region (Muscle Loss): moderate depletion                 Nutrition Follow-Up    RD Follow-up?: Yes

## 2020-01-23 NOTE — PLAN OF CARE
Problem: Physical Therapy Goal  Goal: Physical Therapy Goal  Description  Goals to be met by: 2020     Patient will increase functional independence with mobility by performin. Supine to sit with Set-up Vina  2. Sit to stand transfer with Supervision  3. Gait  x 150 feet with Supervision using LRAD or no AD   4. Lower extremity exercise program x10 reps per handout, with independence     Outcome: Ongoing, Progressing     Pt evaluated and appropriate goals established.     Maren Barnes, PT, DPT  2020  [phone]

## 2020-01-23 NOTE — PLAN OF CARE
CMICU DAILY GOALS       A: Awake    RASS: Goal - RASS Goal: 0-->alert and calm  Actual - RASS (Mcmillan Agitation-Sedation Scale): 1-->restless   Restraint necessity: Clinical Justification: Removing medical devices  B: Breath   SBT: Not intubated   C: Coordinate A & B, analgesics/sedatives   Pain: managed    SAT: Not intubated  D: Delirium   CAM-ICU: Overall CAM-ICU: Negative  E: Early Mobility   MOVE Screen: Pass   Activity: Activity Management: activity adjusted per tolerance  FAS: Feeding/Nutrition   Diet order: Diet/Nutrition Received: low saturated fat/low cholesterol, 2 gram sodium,   Fluid restriction: Fluid Requirement: 1500 cc FR  T: Thrombus   DVT prophylaxis: VTE Required Core Measure: Pharmacological prophylaxis initiated/maintained  H: HOB Elevation   Head of Bed (HOB): HOB at 15 degrees  U: Ulcer Prophylaxis   GI: yes  G: Glucose control   managed    S: Skin   Bundle compliance: yes   Bathing/Skin Care: linen changed Date: 1/21/20 PM shift  B: Bowel Function   no issues   I: Indwelling Catheters   Mc necessity:      Urethral Catheter 01/15/20 2030 Straight-tip 16 Fr.-Reason for Continuing Urinary Catheterization: Critically ill in ICU requiring intensive monitoring   CVC necessity: Yes   IPAD offered: Yes  D: De-escalation Antibx   Yes  Plan for the day   Remove IABP, monitor cardiac numbers, LVAD workup in progress  Family/Goals of care/Code Status   Code Status: Full Code     No acute events throughout day, VS and assessment per flow sheet, patient progressing towards goals as tolerated, plan of care reviewed with Saba Lott and family, all concerns addressed, will continue to monitor.

## 2020-01-23 NOTE — SUBJECTIVE & OBJECTIVE
Interval History: No events overnight. CVP 15 this morning,SVO2 76,CO/CI 9/4.1    Continuous Infusions:   DOBUTamine 5 mcg/kg/min (01/23/20 0800)    furosemide (LASIX) 10 mg/mL infusion (non-titrating) 20 mg/hr (01/23/20 0800)    heparin (porcine) in D5W 15.944 Units/kg/hr (01/23/20 0800)     Scheduled Meds:   acetaminophen  1,000 mg Oral Q8H    atorvastatin  40 mg Oral Daily    cetirizine  10 mg Oral Daily    famotidine  40 mg Oral Daily    hydrALAZINE  50 mg Oral Q8H    isosorbide dinitrate  10 mg Oral Q8H    magnesium oxide  800 mg Oral BID    magnesium sulfate IVPB  1 g Intravenous Once    melatonin  6 mg Oral Nightly    polyethylene glycol  17 g Oral Daily    senna-docusate 8.6-50 mg  2 tablet Oral BID    triamcinolone acetonide 0.5%   Topical (Top) TID     PRN Meds:albuterol, bisacodyl, Dextrose 10% Bolus, Dextrose 10% Bolus, glucagon (human recombinant), glucose, glucose, heparin (PORCINE), heparin (PORCINE), hydrOXYzine HCl, insulin aspart U-100, sodium chloride 0.9%, traMADol    Review of patient's allergies indicates:   Allergen Reactions    Iodine and iodide containing products      Objective:     Vital Signs (Most Recent):  Temp: 97.5 °F (36.4 °C) (01/23/20 0701)  Pulse: (!) 117 (01/23/20 0800)  Resp: (!) 27 (01/23/20 0800)  BP: (!) 82/53 (01/23/20 0800)  SpO2: 98 % (01/23/20 0800) Vital Signs (24h Range):  Temp:  [97.5 °F (36.4 °C)-98.5 °F (36.9 °C)] 97.5 °F (36.4 °C)  Pulse:  [108-124] 117  Resp:  [11-41] 27  SpO2:  [90 %-100 %] 98 %  BP: ()/(50-87) 82/53     Patient Vitals for the past 72 hrs (Last 3 readings):   Weight   01/23/20 0300 88.6 kg (195 lb 5.2 oz)   01/22/20 0300 94 kg (207 lb 3.7 oz)   01/21/20 0300 102.2 kg (225 lb 5 oz)     Body mass index is 30.59 kg/m².      Intake/Output Summary (Last 24 hours) at 1/23/2020 1020  Last data filed at 1/23/2020 0800  Gross per 24 hour   Intake 1421.49 ml   Output 3780 ml   Net -2358.51 ml       Hemodynamic Parameters:        Telemetry: ST    Physical Exam   Constitutional: He is oriented to person, place, and time. He appears well-developed and well-nourished.   HENT:   Head: Normocephalic.   Eyes: Pupils are equal, round, and reactive to light. EOM are normal.   Neck: Normal range of motion. Neck supple.   Cardiovascular: Normal rate and regular rhythm.   Pulmonary/Chest: Effort normal and breath sounds normal.   Decreased BS at bases    Abdominal: Soft. Bowel sounds are normal.   Musculoskeletal: Normal range of motion.   Neurological: He is alert and oriented to person, place, and time.   Skin: Skin is warm.       Significant Labs:  CBC:  Recent Labs   Lab 01/21/20  0352 01/22/20  0433 01/23/20  0400   WBC 10.02 9.92 10.30   RBC 4.32* 4.36* 4.40*   HGB 11.4* 11.7* 12.1*   HCT 36.3* 36.2* 36.3*   * 129* 184   MCV 84 83 83   MCH 26.4* 26.8* 27.5   MCHC 31.4* 32.3 33.3     BNP:  Recent Labs   Lab 01/17/20  0424 01/21/20  0755 01/22/20  1752   BNP 1,851* 3,053* 2,342*     CMP:  Recent Labs   Lab 01/22/20  0433 01/22/20  1616 01/23/20  0400   *  289* 191* 281*  281*   CALCIUM 9.8  9.8 9.7 9.8  9.8   ALBUMIN 3.3* 3.2* 3.5   PROT 7.5 7.4 7.8   *  129* 133* 130*  130*   K 3.5  3.5 4.1 3.8  3.8   CO2 39*  39* 41* 37*  37*   CL 77*  77* 81* 76*  76*   BUN 20  20 21* 22*  22*   CREATININE 1.7*  1.7* 1.7* 1.8*  1.8*   ALKPHOS 111 108 107   ALT 16 16 17   AST 27 28 28   BILITOT 1.3* 1.1* 1.4*      Coagulation:   Recent Labs   Lab 01/22/20  0433 01/22/20  2208 01/23/20  0400   INR  --   --  1.4*   APTT 45.4* 43.9* 47.2*     LDH:  Recent Labs   Lab 01/22/20  1752   *     Microbiology:  Microbiology Results (last 7 days)     ** No results found for the last 168 hours. **          I have reviewed all pertinent labs within the past 24 hours.    Estimated Creatinine Clearance: 49.3 mL/min (A) (based on SCr of 1.8 mg/dL (H)).    Diagnostic Results:  I have reviewed and interpreted all pertinent imaging  results/findings within the past 24 hours.

## 2020-01-23 NOTE — PLAN OF CARE
CMICU DAILY GOALS       A: Awake    RASS: Goal - RASS Goal: 0-->alert and calm  Actual - RASS ([place]illan Agitation-Sedation Scale): 1-->restless   Restraint necessity: Clinical Justification: Removing medical devices  B: Breath   SBT: Not intubated   C: Coordinate A & B, analgesics/sedatives   Pain: managed    SAT: Not intubated  D: Delirium   CAM-ICU: Overall CAM-ICU: Negative  E: Early Mobility   MOVE Screen: Pass   Activity: Activity Management: bedrest maintained per order  FAS: Feeding/Nutrition   Diet order: Diet/Nutrition Received: low saturated fat/low cholesterol, 2 gram sodium, no added salt,   Fluid restriction: Fluid Requirement: 1500 FR  T: Thrombus   DVT prophylaxis: VTE Required Core Measure: Pharmacological prophylaxis initiated/maintained  H: HOB Elevation   Head of Bed (HOB): HOB at 30-45 degrees  U: Ulcer Prophylaxis   GI: yes  G: Glucose control   managed    S: Skin   Bundle compliance: yes   Bathing/Skin Care: back care, bath, chlorhexidine, bath, complete, incontinence care, linen changed, foot care Date: [unfilled]  B: Bowel Function   no issues   I: Indwelling Catheters   Mc necessity:      Urethral Catheter 01/15/20 2030 Straight-tip 16 Fr.-Reason for Continuing Urinary Catheterization: Critically ill in ICU requiring intensive monitoring   CVC necessity: Yes   IPAD offered: Yes  D: De-escalation Antibx   Yes  Plan for the day   Continue VAD workup and education   Family/Goals of care/Code Status   Code Status: Full Code     No acute events throughout the night. Pt had no acute events, CVP 15 and SvO2 73%. VS and assessment per flow sheet, patient progressing towards goals as tolerated, plan of care reviewed with Saba Lott and family, all concerns addressed, will continue to monitor.

## 2020-01-23 NOTE — PROGRESS NOTES
[facility]  Heart Transplant  Progress Note    Patient Name: Saba Lott  MRN: [id number]  Admission Date: 1/15/2020  Hospital Length of Stay: 8 days  Attending Physician: Lian Daniel MD  Primary Care Provider: Primary Doctor No  Principal Problem:Cardiogenic shock    Subjective:     Interval History: No events overnight. CVP 15 this morning,SVO2 76,CO/CI 9/4.1    Continuous Infusions:   DOBUTamine 5 mcg/kg/min (01/23/20 0800)    furosemide (LASIX) 10 mg/mL infusion (non-titrating) 20 mg/hr (01/23/20 0800)    heparin (porcine) in D5W 15.944 Units/kg/hr (01/23/20 0800)     Scheduled Meds:   acetaminophen  1,000 mg Oral Q8H    atorvastatin  40 mg Oral Daily    cetirizine  10 mg Oral Daily    famotidine  40 mg Oral Daily    hydrALAZINE  50 mg Oral Q8H    isosorbide dinitrate  10 mg Oral Q8H    magnesium oxide  800 mg Oral BID    magnesium sulfate IVPB  1 g Intravenous Once    melatonin  6 mg Oral Nightly    polyethylene glycol  17 g Oral Daily    senna-docusate 8.6-50 mg  2 tablet Oral BID    triamcinolone acetonide 0.5%   Topical (Top) TID     PRN Meds:albuterol, bisacodyl, Dextrose 10% Bolus, Dextrose 10% Bolus, glucagon (human recombinant), glucose, glucose, heparin (PORCINE), heparin (PORCINE), hydrOXYzine HCl, insulin aspart U-100, sodium chloride 0.9%, traMADol    Review of patient's allergies indicates:   Allergen Reactions    Iodine and iodide containing products      Objective:     Vital Signs (Most Recent):  Temp: 97.5 °F (36.4 °C) (01/23/20 0701)  Pulse: (!) 117 (01/23/20 0800)  Resp: (!) 27 (01/23/20 0800)  BP: (!) 82/53 (01/23/20 0800)  SpO2: 98 % (01/23/20 0800) Vital Signs (24h Range):  Temp:  [97.5 °F (36.4 °C)-98.5 °F (36.9 °C)] 97.5 °F (36.4 °C)  Pulse:  [108-124] 117  Resp:  [11-41] 27  SpO2:  [90 %-100 %] 98 %  BP: ()/(50-87) 82/53     Patient Vitals for the past 72 hrs (Last 3 readings):   Weight   01/23/20 0300 88.6 kg (195 lb 5.2 oz)   01/22/20 0300  94 kg (207 lb 3.7 oz)   01/21/20 0300 102.2 kg (225 lb 5 oz)     Body mass index is 30.59 kg/m².      Intake/Output Summary (Last 24 hours) at 1/23/2020 1020  Last data filed at 1/23/2020 0800  Gross per 24 hour   Intake 1421.49 ml   Output 3780 ml   Net -2358.51 ml       Hemodynamic Parameters:       Telemetry: ST    Physical Exam   Constitutional: He is oriented to person, place, and time. He appears well-developed and well-nourished.   HENT:   Head: Normocephalic.   Eyes: Pupils are equal, round, and reactive to light. EOM are normal.   Neck: Normal range of motion. Neck supple.   Cardiovascular: Normal rate and regular rhythm.   Pulmonary/Chest: Effort normal and breath sounds normal.   Decreased BS at bases    Abdominal: Soft. Bowel sounds are normal.   Musculoskeletal: Normal range of motion.   Neurological: He is alert and oriented to person, place, and time.   Skin: Skin is warm.       Significant Labs:  CBC:  Recent Labs   Lab 01/21/20  0352 01/22/20  0433 01/23/20  0400   WBC 10.02 9.92 10.30   RBC 4.32* 4.36* 4.40*   HGB 11.4* 11.7* 12.1*   HCT 36.3* 36.2* 36.3*   * 129* 184   MCV 84 83 83   MCH 26.4* 26.8* 27.5   MCHC 31.4* 32.3 33.3     BNP:  Recent Labs   Lab 01/17/20  0424 01/21/20  0755 01/22/20  1752   BNP 1,851* 3,053* 2,342*     CMP:  Recent Labs   Lab 01/22/20  0433 01/22/20  1616 01/23/20  0400   *  289* 191* 281*  281*   CALCIUM 9.8  9.8 9.7 9.8  9.8   ALBUMIN 3.3* 3.2* 3.5   PROT 7.5 7.4 7.8   *  129* 133* 130*  130*   K 3.5  3.5 4.1 3.8  3.8   CO2 39*  39* 41* 37*  37*   CL 77*  77* 81* 76*  76*   BUN 20 20 21* 22*  22*   CREATININE 1.7*  1.7* 1.7* 1.8*  1.8*   ALKPHOS 111 108 107   ALT 16 16 17   AST 27 28 28   BILITOT 1.3* 1.1* 1.4*      Coagulation:   Recent Labs   Lab 01/22/20  0433 01/22/20  2208 01/23/20  0400   INR  --   --  1.4*   APTT 45.4* 43.9* 47.2*     LDH:  Recent Labs   Lab 01/22/20  1752   *     Microbiology:  Microbiology Results  (last 7 days)     ** No results found for the last 168 hours. **          I have reviewed all pertinent labs within the past 24 hours.    Estimated Creatinine Clearance: 49.3 mL/min (A) (based on SCr of 1.8 mg/dL (H)).    Diagnostic Results:  I have reviewed and interpreted all pertinent imaging results/findings within the past 24 hours.    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 Chronic MARC - Class II-III and mild LE edema.      Hospital Course (synopsis of major diagnoses, care, treatment, and services provided during the course of the hospital stay):  -2/12 admit to CDU for diuresis with IV lasix  -IV abx   -CXR with suspected small left pleural effusion with associated left basilar atelectasis versus evolving airspace disease  -2/13 single chamber ICD implant; IV lasix decreased to BID  -2/16 add dobutamine, hold BB due to hypotension, no ACE-I or ARB due to recent HPI hypotension  -2/17 Lasix changed to PO  -2/18 dobutamine discontinued    Admitted to [facility] on Thursday due to severe SOB for a week with associated orthopnea, MARC, and PND unable to lie flat and found to be in acute diastolic heart failure. States he normally weighs around 219 but up to 254lb on admission tonight. Says he hasn't been the most compliant in terms of fluid restriction and daily weights but has avoided salt. BNP on admission was 2375. BUN/CRT 32/1.4 He was started on IV diuretics but continued to deteriorate. Creatinine continued to increase and reached 4.3 with oliguria. He was started on CRRT for a few days and tolerated well. Renal u/s was negative for obstruction and liver u/s without findings of cirrhosis. All of this was progression of cardiorenal syndrome with liver congestion from severe volume overload. On arrival  vitals stable and in no acute distress. He has obvious anasarca up to the chest. He did have uop of 500 at time of arrival also.    TTE 5/28/19  · Moderate left ventricular enlargement.  · Severely decreased left ventricular systolic function. The estimated ejection fraction is 20%  · Global hypokinetic wall motion.  · Grade III (severe) left ventricular diastolic dysfunction consistent with restrictive physiology.  · Severe left atrial enlargement.  · Moderate right ventricular enlargement.  · Low normal right ventricular systolic function.  · Mild right atrial enlargement.  · Moderate mitral regurgitation.  Mild to moderate tricuspid regurgitation    * Cardiogenic shock  -NICM; Likely Etoh induced  -Last 2D Echo 1/16/20: LVEF 20%, LVEDD 6.92 cm   -Transferred from [facility] with IABP at 1:1 for further level of care  - CVP 15, SVO2 76, CO/CI 9.4/4.5 while on Dobutamine @5, Radha 5 . IABP removed yesterday. DC Radha.   - On lasix 20 mg/hr, repeat Echo   - On step 3 pathway for VAD/OHT          Pruritus  -triamcinolone cream, atarax, Zyrtec and Famotidine   -improving    ANJELICA (acute kidney injury)  Cont to monitor with diuresis     Essential hypertension  -Currently essentially normotensive   -Continue Hyd/Isordil    Deep vein thrombosis (DVT) of right lower extremity  -Unsure of timing but was on eliquis PTA.   -Continue on heparin gtt for now      AICD (automatic cardioverter/defibrillator) present  -Medtronic placed 2019 for primary prevention        Asad Westbrook MD  Heart Transplant  [facility]
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
## 2020-01-23 NOTE — PROGRESS NOTES
Pt AAAO, no family at bedside.  Discussed VAD education with him.  Asked him some questions to see if he read the materials and he can't recall anything.  I asked him to re-read it today and begin to initial the bottom of each page.  I asked him to call me when he is done and I will talk with him more about what he read.  Pt verbalized understanding and agreement.

## 2020-01-23 NOTE — PROGRESS NOTES
Update    Pt presents in room alone, aaox4 with pleasant affect. Pt states understanding of plan of care. Provided education regarding caregiver requirements for LVAD and OHT. Pt states understanding and states 2 caregivers will be here Friday to meet with SW for psychosocial assessment. Pt reports coping well and denies further needs, questions, concerns at this time and none indicated. Providing ongoing psychosocial and counseling support, education, resources, assistance and discharge planning as indicated. Following and available.

## 2020-01-23 NOTE — PLAN OF CARE
Goals and POC established this date.   Problem: Occupational Therapy Goal  Goal: Occupational Therapy Goal  Description  Goals to be met by: 7 days 1/30/20     Patient will increase functional independence with ADLs by performing:    Pt to complete UE dressing with set-up  Pt to complete LE dressing with SBA with AE as needed.   Pt to complete toileting with supervision.  Pt to complete standing g/h skills with supervision.  Pt to complete t/f to commode, bed and chair with SBA       Outcome: Ongoing, Progressing

## 2020-01-23 NOTE — HPI
Reason for Consult: Management of  Hyperglycemia     Surgical Procedure and Date:    ICD implantation 02/15/2019      HPI:   Patient is a 54 y.o. 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 Chronic MARC - Class II-III and mild LE edema.  Admitted to [facility] on Thursday due to severe SOB for a week with associated orthopnea, MARC, and PND unable to lie flat and found to be in acute diastolic heart failure.He was started on CRRT for a few days and tolerated well. Renal u/s was negative for obstruction and liver u/s without findings of cirrhosis. All of this was progression of cardiorenal syndrome with liver congestion from severe volume overload. No dx of DM noted on file. Patient denies history of DM at time of consult. Endocrinology consulted to manage BG during admission to [facility].          Lab Results   Component Value Date    HGBA1C 6.6 (H) 01/16/2020

## 2020-01-23 NOTE — ASSESSMENT & PLAN NOTE
-NICM; Likely Etoh induced  -Last 2D Echo 1/16/20: LVEF 20%, LVEDD 6.92 cm   -Transferred from [facility] with IABP at 1:1 for further level of care  - CVP 15, SVO2 76, CO/CI 9.4/4.5 while on Dobutamine @5, Radha 5 . IABP removed yesterday. DC Radha.   - On lasix 20 mg/hr, repeat Echo   - On step 3 pathway for VAD/OHT

## 2020-01-23 NOTE — PROGRESS NOTES
Update    Pt presents in room alone, aaox4. Pt states mother only will be here tomorrow for psychosocial, at 10am. Pt reports coping well and denies further needs, questions, concerns at this time and none indicated. Providing ongoing psychosocial and counseling support, education, resources, assistance and discharge planning as indicated. Following and available.
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
## 2020-01-24 ENCOUNTER — SOCIAL WORK (OUTPATIENT)
Dept: TRANSPLANT | Facility: CLINIC | Age: 55
End: 2020-01-24

## 2020-01-24 LAB
ALBUMIN SERPL BCP-MCNC: 3.5 G/DL (ref 3.5–5.2)
ALLENS TEST: ABNORMAL
ALP SERPL-CCNC: 104 U/L (ref 55–135)
ALT SERPL W/O P-5'-P-CCNC: 22 U/L (ref 10–44)
ANION GAP SERPL CALC-SCNC: 10 MMOL/L (ref 8–16)
ANION GAP SERPL CALC-SCNC: 12 MMOL/L (ref 8–16)
APTT BLDCRRT: 49.8 SEC (ref 21–32)
AST SERPL-CCNC: 31 U/L (ref 10–40)
AT III AG ACT/NOR PPP IA: 64 % (ref 80–120)
BASOPHILS # BLD AUTO: 0.04 K/UL (ref 0–0.2)
BASOPHILS NFR BLD: 0.4 % (ref 0–1.9)
BILIRUB SERPL-MCNC: 1.1 MG/DL (ref 0.1–1)
BUN SERPL-MCNC: 29 MG/DL (ref 6–20)
BUN SERPL-MCNC: 32 MG/DL (ref 6–20)
CALCIUM SERPL-MCNC: 9.4 MG/DL (ref 8.7–10.5)
CALCIUM SERPL-MCNC: 9.5 MG/DL (ref 8.7–10.5)
CARDIOLIPIN IGG SER IA-ACNC: <9.4 GPL (ref 0–14.99)
CARDIOLIPIN IGM SER IA-ACNC: <9.4 MPL (ref 0–12.49)
CHLORIDE SERPL-SCNC: 79 MMOL/L (ref 95–110)
CHLORIDE SERPL-SCNC: 82 MMOL/L (ref 95–110)
CMV IGG SERPL QL IA: REACTIVE
CO2 SERPL-SCNC: 36 MMOL/L (ref 23–29)
CO2 SERPL-SCNC: 38 MMOL/L (ref 23–29)
COTININE SERPL-MCNC: <3 NG/ML
CREAT SERPL-MCNC: 1.7 MG/DL (ref 0.5–1.4)
CREAT SERPL-MCNC: 2 MG/DL (ref 0.5–1.4)
DELSYS: ABNORMAL
DELSYS: ABNORMAL
DIFFERENTIAL METHOD: ABNORMAL
EBV VCA IGG SER QL IA: POSITIVE
EOSINOPHIL # BLD AUTO: 0.2 K/UL (ref 0–0.5)
EOSINOPHIL NFR BLD: 1.8 % (ref 0–8)
ERYTHROCYTE [DISTWIDTH] IN BLOOD BY AUTOMATED COUNT: 17.2 % (ref 11.5–14.5)
EST. GFR  (AFRICAN AMERICAN): 42.2 ML/MIN/1.73 M^2
EST. GFR  (AFRICAN AMERICAN): 51.3 ML/MIN/1.73 M^2
EST. GFR  (NON AFRICAN AMERICAN): 36.5 ML/MIN/1.73 M^2
EST. GFR  (NON AFRICAN AMERICAN): 44.4 ML/MIN/1.73 M^2
FLOW: 4
GLUCOSE SERPL-MCNC: 242 MG/DL (ref 70–110)
GLUCOSE SERPL-MCNC: 258 MG/DL (ref 70–110)
HCO3 UR-SCNC: 43.2 MMOL/L (ref 24–28)
HCO3 UR-SCNC: 45.9 MMOL/L (ref 24–28)
HCO3 UR-SCNC: 46.7 MMOL/L (ref 24–28)
HCT VFR BLD AUTO: 36.3 % (ref 40–54)
HGB BLD-MCNC: 11.8 G/DL (ref 14–18)
HSV1 IGG SERPL QL IA: POSITIVE
HSV2 IGG SERPL QL IA: POSITIVE
IMM GRANULOCYTES # BLD AUTO: 0.05 K/UL (ref 0–0.04)
IMM GRANULOCYTES NFR BLD AUTO: 0.4 % (ref 0–0.5)
LYMPHOCYTES # BLD AUTO: 1.6 K/UL (ref 1–4.8)
LYMPHOCYTES NFR BLD: 14.5 % (ref 18–48)
MAGNESIUM SERPL-MCNC: 2.1 MG/DL (ref 1.6–2.6)
MAGNESIUM SERPL-MCNC: 2.2 MG/DL (ref 1.6–2.6)
MCH RBC QN AUTO: 27.1 PG (ref 27–31)
MCHC RBC AUTO-ENTMCNC: 32.5 G/DL (ref 32–36)
MCV RBC AUTO: 83 FL (ref 82–98)
METHEMOGLOBIN: 0.4 % (ref 0–3)
MODE: ABNORMAL
MODE: ABNORMAL
MONOCYTES # BLD AUTO: 1 K/UL (ref 0.3–1)
MONOCYTES NFR BLD: 8.9 % (ref 4–15)
NEUTROPHILS # BLD AUTO: 8.4 K/UL (ref 1.8–7.7)
NEUTROPHILS NFR BLD: 74 % (ref 38–73)
NICOTINE SERPL-MCNC: <3 NG/ML
NRBC BLD-RTO: 0 /100 WBC
PCO2 BLDA: 54.1 MMHG (ref 35–45)
PCO2 BLDA: 55.9 MMHG (ref 35–45)
PCO2 BLDA: 56.5 MMHG (ref 35–45)
PH SMN: 7.49 [PH] (ref 7.35–7.45)
PH SMN: 7.53 [PH] (ref 7.35–7.45)
PH SMN: 7.54 [PH] (ref 7.35–7.45)
PHOSPHATE SERPL-MCNC: 4.1 MG/DL (ref 2.7–4.5)
PLATELET # BLD AUTO: 231 K/UL (ref 150–350)
PMV BLD AUTO: 10.3 FL (ref 9.2–12.9)
PO2 BLDA: 27 MMHG (ref 40–60)
PO2 BLDA: 28 MMHG (ref 40–60)
PO2 BLDA: 30 MMHG (ref 40–60)
POC BE: 20 MMOL/L
POC BE: 23 MMOL/L
POC BE: 24 MMOL/L
POC SATURATED O2: 57 % (ref 95–100)
POC SATURATED O2: 57 % (ref 95–100)
POC SATURATED O2: 62 % (ref 95–100)
POC TCO2: 45 MMOL/L (ref 24–29)
POC TCO2: 47 MMOL/L (ref 24–29)
POC TCO2: 48 MMOL/L (ref 24–29)
POCT GLUCOSE: 119 MG/DL (ref 70–110)
POCT GLUCOSE: 143 MG/DL (ref 70–110)
POCT GLUCOSE: 216 MG/DL (ref 70–110)
POCT GLUCOSE: 235 MG/DL (ref 70–110)
POCT GLUCOSE: 259 MG/DL (ref 70–110)
POCT GLUCOSE: 267 MG/DL (ref 70–110)
POTASSIUM SERPL-SCNC: 4.3 MMOL/L (ref 3.5–5.1)
POTASSIUM SERPL-SCNC: 4.3 MMOL/L (ref 3.5–5.1)
PROT SERPL-MCNC: 7.8 G/DL (ref 6–8.4)
RBC # BLD AUTO: 4.36 M/UL (ref 4.6–6.2)
SAMPLE: ABNORMAL
SITE: ABNORMAL
SODIUM SERPL-SCNC: 126 MMOL/L (ref 136–145)
SODIUM SERPL-SCNC: 126 MMOL/L (ref 136–145)
SODIUM SERPL-SCNC: 128 MMOL/L (ref 136–145)
SODIUM SERPL-SCNC: 128 MMOL/L (ref 136–145)
SODIUM SERPL-SCNC: 129 MMOL/L (ref 136–145)
SP02: 100
STRONGYLOIDES ANTIBODY IGG: NEGATIVE
VWF AG ACT/NOR PPP IA: 237 % (ref 55–200)
VWF:AC ACT/NOR PPP IA: 174 % (ref 55–200)
WBC # BLD AUTO: 11.27 K/UL (ref 3.9–12.7)

## 2020-01-24 PROCEDURE — 84100 ASSAY OF PHOSPHORUS: CPT | Mod: NTX

## 2020-01-24 PROCEDURE — 25000003 PHARM REV CODE 250: Mod: NTX | Performed by: HOSPITALIST

## 2020-01-24 PROCEDURE — 25000003 PHARM REV CODE 250: Mod: NTX | Performed by: INTERNAL MEDICINE

## 2020-01-24 PROCEDURE — 83050 HGB METHEMOGLOBIN QUAN: CPT | Mod: NTX

## 2020-01-24 PROCEDURE — 85730 THROMBOPLASTIN TIME PARTIAL: CPT | Mod: NTX

## 2020-01-24 PROCEDURE — 99291 PR CRITICAL CARE, E/M 30-74 MINUTES: ICD-10-PCS | Mod: NTX,,, | Performed by: INTERNAL MEDICINE

## 2020-01-24 PROCEDURE — 20000000 HC ICU ROOM: Mod: NTX

## 2020-01-24 PROCEDURE — 63600175 PHARM REV CODE 636 W HCPCS: Mod: NTX | Performed by: STUDENT IN AN ORGANIZED HEALTH CARE EDUCATION/TRAINING PROGRAM

## 2020-01-24 PROCEDURE — 27000221 HC OXYGEN, UP TO 24 HOURS: Mod: NTX

## 2020-01-24 PROCEDURE — 63600175 PHARM REV CODE 636 W HCPCS: Mod: NTX | Performed by: INTERNAL MEDICINE

## 2020-01-24 PROCEDURE — 82803 BLOOD GASES ANY COMBINATION: CPT | Mod: NTX

## 2020-01-24 PROCEDURE — 63600367 HC NITRIC OXIDE PER HOUR: Mod: NTX

## 2020-01-24 PROCEDURE — 99900035 HC TECH TIME PER 15 MIN (STAT): Mod: NTX

## 2020-01-24 PROCEDURE — 85025 COMPLETE CBC W/AUTO DIFF WBC: CPT | Mod: NTX

## 2020-01-24 PROCEDURE — 25000003 PHARM REV CODE 250: Mod: NTX | Performed by: STUDENT IN AN ORGANIZED HEALTH CARE EDUCATION/TRAINING PROGRAM

## 2020-01-24 PROCEDURE — 80048 BASIC METABOLIC PNL TOTAL CA: CPT | Mod: NTX

## 2020-01-24 PROCEDURE — 80053 COMPREHEN METABOLIC PANEL: CPT | Mod: NTX

## 2020-01-24 PROCEDURE — 99291 CRITICAL CARE FIRST HOUR: CPT | Mod: NTX,,, | Performed by: INTERNAL MEDICINE

## 2020-01-24 PROCEDURE — 94761 N-INVAS EAR/PLS OXIMETRY MLT: CPT | Mod: NTX

## 2020-01-24 PROCEDURE — 63600175 PHARM REV CODE 636 W HCPCS: Mod: NTX | Performed by: HOSPITALIST

## 2020-01-24 PROCEDURE — 83735 ASSAY OF MAGNESIUM: CPT | Mod: NTX

## 2020-01-24 PROCEDURE — 25000003 PHARM REV CODE 250: Mod: NTX | Performed by: NURSE PRACTITIONER

## 2020-01-24 PROCEDURE — 84295 ASSAY OF SERUM SODIUM: CPT | Mod: NTX

## 2020-01-24 RX ORDER — FAMOTIDINE 40 MG/5ML
40 POWDER, FOR SUSPENSION ORAL DAILY
Status: DISCONTINUED | OUTPATIENT
Start: 2020-01-24 | End: 2020-01-28

## 2020-01-24 RX ORDER — TOLVAPTAN 15 MG/1
15 TABLET ORAL ONCE
Status: COMPLETED | OUTPATIENT
Start: 2020-01-24 | End: 2020-01-24

## 2020-01-24 RX ADMIN — TRIAMCINOLONE ACETONIDE: 5 CREAM TOPICAL at 09:01

## 2020-01-24 RX ADMIN — ATORVASTATIN CALCIUM 40 MG: 20 TABLET, FILM COATED ORAL at 09:01

## 2020-01-24 RX ADMIN — FUROSEMIDE 20 MG/HR: 10 INJECTION, SOLUTION INTRAMUSCULAR; INTRAVENOUS at 09:01

## 2020-01-24 RX ADMIN — INSULIN ASPART 2 UNITS: 100 INJECTION, SOLUTION INTRAVENOUS; SUBCUTANEOUS at 12:01

## 2020-01-24 RX ADMIN — FAMOTIDINE 40 MG: 40 POWDER, FOR SUSPENSION ORAL at 09:01

## 2020-01-24 RX ADMIN — DOBUTAMINE IN DEXTROSE 5 MCG/KG/MIN: 200 INJECTION, SOLUTION INTRAVENOUS at 05:01

## 2020-01-24 RX ADMIN — Medication 6 MG: at 08:01

## 2020-01-24 RX ADMIN — TOLVAPTAN 15 MG: 15 TABLET ORAL at 01:01

## 2020-01-24 RX ADMIN — DOBUTAMINE IN DEXTROSE 5 MCG/KG/MIN: 200 INJECTION, SOLUTION INTRAVENOUS at 01:01

## 2020-01-24 RX ADMIN — HYDRALAZINE HYDROCHLORIDE 50 MG: 50 TABLET ORAL at 01:01

## 2020-01-24 RX ADMIN — CETIRIZINE HYDROCHLORIDE 10 MG: 10 TABLET, FILM COATED ORAL at 09:01

## 2020-01-24 RX ADMIN — CHLOROTHIAZIDE SODIUM 250 MG: 500 INJECTION, POWDER, LYOPHILIZED, FOR SOLUTION INTRAVENOUS at 06:01

## 2020-01-24 RX ADMIN — Medication 800 MG: at 08:01

## 2020-01-24 RX ADMIN — POTASSIUM CHLORIDE 40 MEQ: 1500 TABLET, EXTENDED RELEASE ORAL at 08:01

## 2020-01-24 RX ADMIN — TRIAMCINOLONE ACETONIDE: 5 CREAM TOPICAL at 03:01

## 2020-01-24 RX ADMIN — HEPARIN SODIUM AND DEXTROSE 16 UNITS/KG/HR: 10000; 5 INJECTION INTRAVENOUS at 09:01

## 2020-01-24 RX ADMIN — ISOSORBIDE DINITRATE 10 MG: 10 TABLET ORAL at 09:01

## 2020-01-24 RX ADMIN — INSULIN ASPART 1 UNITS: 100 INJECTION, SOLUTION INTRAVENOUS; SUBCUTANEOUS at 08:01

## 2020-01-24 RX ADMIN — POTASSIUM CHLORIDE 40 MEQ: 1500 TABLET, EXTENDED RELEASE ORAL at 09:01

## 2020-01-24 RX ADMIN — ISOSORBIDE DINITRATE 10 MG: 10 TABLET ORAL at 06:01

## 2020-01-24 RX ADMIN — TRAMADOL HYDROCHLORIDE 50 MG: 50 TABLET, FILM COATED ORAL at 08:01

## 2020-01-24 RX ADMIN — ACETAMINOPHEN 1000 MG: 500 TABLET ORAL at 06:01

## 2020-01-24 RX ADMIN — Medication 800 MG: at 09:01

## 2020-01-24 RX ADMIN — HYDRALAZINE HYDROCHLORIDE 50 MG: 50 TABLET ORAL at 09:01

## 2020-01-24 RX ADMIN — ACETAMINOPHEN 1000 MG: 500 TABLET ORAL at 01:01

## 2020-01-24 RX ADMIN — ISOSORBIDE DINITRATE 10 MG: 10 TABLET ORAL at 01:01

## 2020-01-24 RX ADMIN — HYDRALAZINE HYDROCHLORIDE 50 MG: 50 TABLET ORAL at 06:01

## 2020-01-24 NOTE — ASSESSMENT & PLAN NOTE
Titrate insulin slowly to avoid hypoglycemia as the risk of hypoglycemia increases with decreased creatinine clearance.

## 2020-01-24 NOTE — PROGRESS NOTES
[facility]  Heart Transplant  Progress Note    Patient Name: Saba Lott  MRN: [id number]  Admission Date: 1/15/2020  Hospital Length of Stay: 9 days  Attending Physician: Lian Daniel MD  Primary Care Provider: Primary Doctor No  Principal Problem:Cardiogenic shock    Subjective:     Interval History: CVP 19 this morning. SVO2 62.     Continuous Infusions:   DOBUTamine 5 mcg/kg/min (01/24/20 0900)    furosemide (LASIX) 10 mg/mL infusion (non-titrating) 20 mg/hr (01/24/20 0918)    heparin (porcine) in D5W 16 Units/kg/hr (01/24/20 0918)     Scheduled Meds:   acetaminophen  1,000 mg Oral Q8H    atorvastatin  40 mg Oral Daily    cetirizine  10 mg Oral Daily    famotidine  40 mg Oral Daily    hydrALAZINE  50 mg Oral Q8H    isosorbide dinitrate  10 mg Oral Q8H    magnesium oxide  800 mg Oral BID    magnesium sulfate IVPB  1 g Intravenous Once    melatonin  6 mg Oral Nightly    polyethylene glycol  17 g Oral Daily    potassium chloride  40 mEq Oral BID    senna-docusate 8.6-50 mg  2 tablet Oral BID    triamcinolone acetonide 0.5%   Topical (Top) TID     PRN Meds:albuterol, bisacodyl, Dextrose 10% Bolus, Dextrose 10% Bolus, glucagon (human recombinant), glucose, glucose, heparin (PORCINE), heparin (PORCINE), hydrOXYzine HCl, insulin aspart U-100, sodium chloride 0.9%, traMADol    Review of patient's allergies indicates:   Allergen Reactions    Iodine and iodide containing products      Objective:     Vital Signs (Most Recent):  Temp: 98.1 °F (36.7 °C) (01/24/20 0701)  Pulse: (!) 112 (01/24/20 0833)  Resp: 20 (01/24/20 0833)  BP: 104/67 (01/24/20 0800)  SpO2: 96 % (01/24/20 0833) Vital Signs (24h Range):  Temp:  [97.8 °F (36.6 °C)-98.1 °F (36.7 °C)] 98.1 °F (36.7 °C)  Pulse:  [105-123] 112  Resp:  [17-36] 20  SpO2:  [90 %-100 %] 96 %  BP: ()/(53-91) 104/67     Patient Vitals for the past 72 hrs (Last 3 readings):   Weight   01/24/20 0300 88.5 kg (195 lb 1.7 oz)   01/23/20 1200  88.5 kg (195 lb)   01/23/20 0300 88.6 kg (195 lb 5.2 oz)     Body mass index is 30.56 kg/m².      Intake/Output Summary (Last 24 hours) at 1/24/2020 1009  Last data filed at 1/24/2020 0900  Gross per 24 hour   Intake 1837.11 ml   Output 2870 ml   Net -1032.89 ml       Hemodynamic Parameters:       Telemetry: NSR    Physical Exam  Constitutional: He is oriented to person, place, and time. He appears well-developed and well-nourished.   HENT:   Head: Normocephalic.   Eyes: Pupils are equal, round, and reactive to light. EOM are normal.   Neck: Normal range of motion. Neck supple.   Cardiovascular: Normal rate and regular rhythm.   Pulmonary/Chest: Effort normal and breath sounds normal.   Decreased BS at bases    Abdominal: Soft. Bowel sounds are normal.   Musculoskeletal: Normal range of motion.   Neurological: He is alert and oriented to person, place, and time.   Skin: Skin is warm.   Significant Labs:  CBC:  Recent Labs   Lab 01/22/20  0433 01/23/20  0400 01/24/20  0330   WBC 9.92 10.30 11.27   RBC 4.36* 4.40* 4.36*   HGB 11.7* 12.1* 11.8*   HCT 36.2* 36.3* 36.3*   * 184 231   MCV 83 83 83   MCH 26.8* 27.5 27.1   MCHC 32.3 33.3 32.5     BNP:  Recent Labs   Lab 01/21/20  0755 01/22/20  1752   BNP 3,053* 2,342*     CMP:  Recent Labs   Lab 01/22/20  1616 01/23/20  0400 01/23/20  1514 01/24/20  0330   * 281*  281* 213* 258*   CALCIUM 9.7 9.8  9.8 9.5 9.4   ALBUMIN 3.2* 3.5 3.3*  --    PROT 7.4 7.8 7.7  --    * 130*  130* 131* 129*   K 4.1 3.8  3.8 3.9 4.3   CO2 41* 37*  37* 39* 38*   CL 81* 76*  76* 79* 79*   BUN 21* 22*  22* 25* 29*   CREATININE 1.7* 1.8*  1.8* 1.8* 1.7*   ALKPHOS 108 107 106  --    ALT 16 17 18  --    AST 28 28 31  --    BILITOT 1.1* 1.4* 1.1*  --       Coagulation:   Recent Labs   Lab 01/22/20  2208 01/23/20  0400 01/24/20  0330   INR  --  1.4*  --    APTT 43.9* 47.2* 49.8*     LDH:  Recent Labs   Lab 01/22/20  1752   *     Microbiology:  Microbiology Results  (last 7 days)     ** No results found for the last 168 hours. **          I have reviewed all pertinent labs within the past 24 hours.    Estimated Creatinine Clearance: 52.2 mL/min (A) (based on SCr of 1.7 mg/dL (H)).    Diagnostic Results:  I have reviewed and interpreted all pertinent imaging results/findings within the past 24 hours.    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 Chronic MARC - Class II-III and mild LE edema.      Hospital Course (synopsis of major diagnoses, care, treatment, and services provided during the course of the hospital stay):  -2/12 admit to CDU for diuresis with IV lasix  -IV abx   -CXR with suspected small left pleural effusion with associated left basilar atelectasis versus evolving airspace disease  -2/13 single chamber ICD implant; IV lasix decreased to BID  -2/16 add dobutamine, hold BB due to hypotension, no ACE-I or ARB due to recent HPI hypotension  -2/17 Lasix changed to PO  -2/18 dobutamine discontinued    Admitted to [facility] on Thursday due to severe SOB for a week with associated orthopnea, MARC, and PND unable to lie flat and found to be in acute diastolic heart failure. States he normally weighs around 219 but up to 254lb on admission tonight. Says he hasn't been the most compliant in terms of fluid restriction and daily weights but has avoided salt. BNP on admission was 2375. BUN/CRT 32/1.4 He was started on IV diuretics but continued to deteriorate. Creatinine continued to increase and reached 4.3 with oliguria. He was started on CRRT for a few days and tolerated well. Renal u/s was negative for obstruction and liver u/s without findings of cirrhosis. All of this was progression of cardiorenal syndrome with liver congestion from severe volume overload. On arrival  vitals stable and in no acute distress. He has obvious anasarca up to the chest. He did have uop of 500 at time of arrival also.    TTE 5/28/19  · Moderate left ventricular enlargement.  · Severely decreased left ventricular systolic function. The estimated ejection fraction is 20%  · Global hypokinetic wall motion.  · Grade III (severe) left ventricular diastolic dysfunction consistent with restrictive physiology.  · Severe left atrial enlargement.  · Moderate right ventricular enlargement.  · Low normal right ventricular systolic function.  · Mild right atrial enlargement.  · Moderate mitral regurgitation.  Mild to moderate tricuspid regurgitation    * Cardiogenic shock  -NICM; Likely Etoh induced  -Last 2D Echo 1/16/20: LVEF 20%, LVEDD 6.92 cm   -Transferred from [facility] with IABP at 1:1 for further level of care  - CVP 19, SVO2 62, CO/CI5.5/2.2 while on Dobutamine @5,Lasix drip@20. Will give him one dose of tolvaptan   - Rpt Echo showed TAPSI 1.26.   - On step 3 pathway for VAD/OHT          Pruritus  -triamcinolone cream, atarax, Zyrtec and Famotidine   -improving    ANJELICA (acute kidney injury)  Cont to monitor with diuresis     Essential hypertension  -Currently essentially normotensive   -Continue Hyd/Isordil    Deep vein thrombosis (DVT) of right lower extremity  -Unsure of timing but was on eliquis PTA.   -Continue on heparin gtt for now      AICD (automatic cardioverter/defibrillator) present  -Medtronic placed 2019 for primary prevention        Asad Westbrook MD  Heart Transplant  [facility]

## 2020-01-24 NOTE — CONSULTS
[facility]  Endocrinology  Diabetes Consult Note    Consult Requested by: Lian Daniel MD   Reason for admit: Cardiogenic shock    HISTORY OF PRESENT ILLNESS:  Reason for Consult: Management of  Hyperglycemia     Surgical Procedure and Date:    ICD implantation 02/15/2019      HPI:   Patient is a 54 y.o. 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 Chronic MARC - Class II-III and mild LE edema.  Admitted to [facility] on Thursday due to severe SOB for a week with associated orthopnea, MARC, and PND unable to lie flat and found to be in acute diastolic heart failure.He was started on CRRT for a few days and tolerated well. Renal u/s was negative for obstruction and liver u/s without findings of cirrhosis. All of this was progression of cardiorenal syndrome with liver congestion from severe volume overload. No dx of DM noted on file. Patient denies history of DM at time of consult. Endocrinology consulted to manage BG during [place] [facility]          Lab Results   Component Value Date    HGBA1C 6.6 (H) 2020       Interval HPI:   Overnight events:   BG is reasonably controlled on current insulin regimen.     Eatin% - 100%  Nausea: No  Hypoglycemia and intervention: No  Fever: No  TPN and/or TF: No  If yes, type of TF/TPN and rate: None    PMH, PSH, FH, SH updated and reviewed     Review of Systems  Constitutional: Negative for weight changes.  Eyes: Negative for visual disturbance.  Respiratory: Negative for cough.   Cardiovascular: Negative for chest pain.  Gastrointestinal: Negative for nausea.  Endocrine: Negative for polyuria, polydipsia.  Musculoskeletal: Negative for back pain.  Skin: Negative for rash.  Neurological: Negative for syncope.  Psychiatric/Behavioral: Negative for depression.    Current  Medications and/or Treatments Impacting Glycemic Control  Immunotherapy:    Immunosuppressants     None        Steroids:   Hormones (From admission, onward)    Start     Stop Route Frequency Ordered    01/17/20 2100  melatonin tablet 6 mg      -- Oral Nightly 01/17/20 1002        Pressors:    Autonomic Drugs (From admission, onward)    None        Hyperglycemia/Diabetes Medications:   Antihyperglycemics (From admission, onward)    Start     Stop Route Frequency Ordered    01/23/20 1019  insulin aspart U-100 pen 0-5 Units      -- SubQ Before meals & nightly PRN 01/23/20 0919             PHYSICAL EXAMINATION:  Vitals:    01/23/20 1800   BP: (!) 105/57   Pulse: (!) 112   Resp: (!) 22   Temp:      Body mass index is 30.54 kg/m².    Physical Exam   Constitutional: Well developed, well nourished, NAD.  ENT: External ears no masses with nose patent; normal hearing.  Neck: Supple; trachea midline.  Cardiovascular: Normal heart sounds, no LE edema. DP +2 bilaterally.  Lungs: Normal effort; lungs anterior bilaterally clear to auscultation.  Abdomen: Soft, no masses, no hernias.  MS: No clubbing or cyanosis of nails noted; unable to assess gait.  Skin: No rashes, lesions, or ulcers; no nodules.   Psychiatric: Good judgement and insight; normal mood and affect.  Neurological: Cranial nerves are grossly intact. Normal sensation in the bilateral lower extremities.  Foot: Nails in poor condition, but no amputations noted.         Labs Reviewed and Include   Recent Labs   Lab 01/23/20  1514   *   CALCIUM 9.5   ALBUMIN 3.3*   PROT 7.7   *   K 3.9   CO2 39*   CL 79*   BUN 25*   CREATININE 1.8*   ALKPHOS 106   ALT 18   AST 31   BILITOT 1.1*     Lab Results   Component Value Date    WBC 10.30 01/23/2020    HGB 12.1 (L) 01/23/2020    HCT 36.3 (L) 01/23/2020    MCV 83 01/23/2020     01/23/2020     Recent Labs   Lab 01/23/20  0400   TSH 2.179     Lab Results   Component Value Date    HGBA1C 6.6 (H) 01/16/2020        Nutritional status:   Body mass index is 30.54 kg/m².  Lab Results   Component Value Date    ALBUMIN 3.3 (L) 01/23/2020    ALBUMIN 3.5 01/23/2020    ALBUMIN 3.2 (L) 01/22/2020     Lab Results   Component Value Date    PREALBUMIN 15 (L) 01/22/2020    PREALBUMIN 17 (L) 07/23/2012    PREALBUMIN 21 06/30/2012       Estimated Creatinine Clearance: 49.3 mL/min (A) (based on SCr of 1.8 mg/dL (H)).    Accu-Checks  Recent Labs     01/23/20  1210 01/23/20  1648   POCTGLUCOSE 158* 120*        ASSESSMENT and PLAN    * Cardiogenic shock  Managed per primary team  Avoid hypoglycemia        Acute hyperglycemia  BG goal 140 - 180     BG is reasonably controlled with minimal to no use of insulin therapy.   Low Dose SQ Insulin Correction Scale PRN BG excursions.   BG Monitoring AC/HS     ** Please call Endocrine for any BG related issues **  ** Please notify Endocrine for any change and/or advance in diet**    Discharge Planning:    TBD. Please notify endocrinology prior to discharge.         ANJELICA (acute kidney injury)  Titrate insulin slowly to avoid hypoglycemia as the risk of hypoglycemia increases with decreased creatinine clearance.          Essential hypertension  Managed per primary team  Condition may cause insulin resistance         Morbid obesity  Body mass index is 30.54 kg/m².  may contribute to insulin resistance          Plan discussed with patient and family at bedside.     Saad Mayo, JUAN  Endocrinology  [facility]

## 2020-01-24 NOTE — SUBJECTIVE & OBJECTIVE
Interval HPI:   Overnight events:   BG is reasonably controlled on current insulin regimen.     Eatin% - 100%  Nausea: No  Hypoglycemia and intervention: No  Fever: No  TPN and/or TF: No  If yes, type of TF/TPN and rate: None    PMH, PSH, FH, SH updated and reviewed     Review of Systems  Constitutional: Negative for weight changes.  Eyes: Negative for visual disturbance.  Respiratory: Negative for cough.   Cardiovascular: Negative for chest pain.  Gastrointestinal: Negative for nausea.  Endocrine: Negative for polyuria, polydipsia.  Musculoskeletal: Negative for back pain.  Skin: Negative for rash.  Neurological: Negative for syncope.  Psychiatric/Behavioral: Negative for depression.    Current Medications and/or Treatments Impacting Glycemic Control  Immunotherapy:    Immunosuppressants     None        Steroids:   Hormones (From admission, onward)    Start     Stop Route Frequency Ordered    20 2100  melatonin tablet 6 mg      -- Oral Nightly 20 1002        Pressors:    Autonomic Drugs (From admission, onward)    None        Hyperglycemia/Diabetes Medications:   Antihyperglycemics (From admission, onward)    Start     Stop Route Frequency Ordered    20 1019  insulin aspart U-100 pen 0-5 Units      -- SubQ Before meals & nightly PRN 20 0919             PHYSICAL EXAMINATION:  Vitals:    20 1800   BP: (!) 105/57   Pulse: (!) 112   Resp: (!) 22   Temp:      Body mass index is 30.54 kg/m².    Physical Exam   Constitutional: Well developed, well nourished, NAD.  ENT: External ears no masses with nose patent; normal hearing.  Neck: Supple; trachea midline.  Cardiovascular: Normal heart sounds, no LE edema. DP +2 bilaterally.  Lungs: Normal effort; lungs anterior bilaterally clear to auscultation.  Abdomen: Soft, no masses, no hernias.  MS: No clubbing or cyanosis of nails noted; unable to assess gait.  Skin: No rashes, lesions, or ulcers; no nodules.   Psychiatric: Good judgement and  insight; normal mood and affect.  Neurological: Cranial nerves are grossly intact. Normal sensation in the bilateral lower extremities.  Foot: Nails in poor condition, but no amputations noted.

## 2020-01-24 NOTE — PHYSICIAN QUERY
PT Name: Saba Lott  MR #: [id number]    Physician Query Form - Nutrition Clarification     CDS/: Tisha Ferguson RN, CCDS              Contact information: [email or web address]    This form is a permanent document in the medical record.     Query Date: January 24, 2020    By submitting this query, we are merely seeking further clarification of documentation.. Please utilize your independent clinical judgment when addressing the question(s) below.    The Medical record contains the following:   Indicators  Supporting Clinical Findings Location in Medical Record   X % of Estimated Energy Intake over a time frame from p.o., TF, or TPN <75% of estimated energy requirement for 3+ months 1/23 RD note    Weight Status over a time frame     X Subcutaneous Fat and/or Muscle Loss Completed NFPE today: pt has mild to moderate muscle wasting and fat depletion and meets criteria for chronic acute malnutrition. 1/23 RD note   X Fluid Accumulation or Edema  Pt with chronic fluid shifts resulting in wt gain and wt loss; pt has 30+ lb fluid loss since admission  1/23 RD note    Reduced  Strength     X Wt / BMI / Usual Body Weight BMI 30.6 1/23 RD note    Delayed Wound Healing / Failure to Thrive     X Acute or Chronic Illness Cardiogenic Shock, ANJELICA, CMP    Acute on chronic combined systolic and diastolic HF 1/15 h/p    1/17 md query    Medication      Treatment     X Other Moderate Protein-Calorie Malnutrition 1/23 RD note     AND / ASPEN Clinical Characteristics (October 2011)  A minimum of two characteristics is recommended for diagnosing either moderate or severe malnutrition   Mild Malnutrition Moderate Malnutrition Severe Malnutrition   Energy Intake from p.o., TF or TPN. < 75% intake of estimated energy needs for less than 7 days < 75% intake of estimated energy needs for greater than 7 days < 50% intake of estimated energy needs for > 5 days   Weight Loss 1-2% in 1 month  5% in 3 months  7.5% in 6 months  10% in 1  year 1-2 % in 1 week  5% in 1 month  7.5% in 3 months  10% in 6 months  20% in 1 year > 2% in 1 week  > 5% in 1 month  > 7.5% in 3 months  > 10% in 6 months  > 20% in 1 year   Physical Findings     None *Mild subcutaneous fat and/or muscle loss  *Mild fluid accumulation  *Stage II decubitus  *Surgical wound or non-healing wound *Mod/severe subcutaneous fat and/or muscle loss  *Mod/severe fluid accumulation  *Stage III or IV decubitus  *Non-healing surgical wound     Provider, please specify diagnosis or diagnoses associated with above clinical findings.    [ x ] Moderate Protein-Calorie Malnutrition   [  ] Other Nutritional Diagnosis (please specify):    [  ] Other:    [  ] Clinically Undetermined       Please document in your progress notes daily for the duration of treatment until resolved and include in your discharge summary.

## 2020-01-24 NOTE — PLAN OF CARE
No significant events throughout the day. Pt AAO. On 4L NC. Nitric off since AM. ST 110s. BP soft. CVP elevated 19 and 20. Diuril given x1 dose. Good appetite. 2 Bms. Urine output 1L.  50-150ml/hr. Up in chair with assistance during the day. Echo completed. Gtts: Lasix, , Heparin, KVO. POC for possible step down tomorrow discussed with patient. All concerns addressed. WCTM.       [facility] DAILY GOALS       A: Awake    RASS: Goal - RASS Goal: 0-->alert and calm  Actual - RASS ([place] Agitation-Sedation Scale): 0-->alert and calm   Restraint necessity: Clinical Justification: Removing medical devices  B: Breath   SBT: Not intubated   C: Coordinate A & B, analgesics/sedatives   Pain: managed    SAT: Not intubated  D: Delirium   CAM-ICU: Overall CAM-ICU: Negative  E: Early Mobility   MOVE Screen: Pass   Activity: Activity Management: activity adjusted per tolerance  FAS: Feeding/Nutrition   Diet order: Diet/Nutrition Received: 2 gram sodium, low saturated fat/low cholesterol,   Fluid restriction: Fluid Requirement: 1500FR  T: Thrombus   DVT prophylaxis: VTE Required Core Measure: Pharmacological prophylaxis initiated/maintained  H: HOB Elevation   Head of Bed (HOB): HOB at 30-45 degrees  U: Ulcer Prophylaxis   GI: yes  G: Glucose control   managed    S: Skin   Bundle compliance: yes   Bathing/Skin Care: back care, bath, chlorhexidine, bath, complete, dressed/undressed, incontinence care, linen changed, nail care Date: 01/23/2019 day shift  B: Bowel Function   no issues   I: Indwelling Catheters   Mc necessity:      Urethral Catheter 01/15/20 2030 Straight-tip 16 Fr.-Reason for Continuing Urinary Catheterization: Critically ill in ICU requiring intensive monitoring   CVC necessity: Yes   IPAD offered: Yes  D: De-escalation Antibx      Plan for the day   Diurese/ step down tomorrow  Family/Goals of care/Code Status   Code Status: Full Code     No acute events throughout day, VS and assessment per flow sheet, patient  progressing towards goals as tolerated, plan of care reviewed with Saba Lott and family, all concerns addressed, will continue to monitor.

## 2020-01-24 NOTE — PROGRESS NOTES
Pt, mother and father all AAAO.  Spent 30 -40 minutes at bedside twice today for verbal and written VAD education.  I have reviewed the contents of this in detail and all questions have been answered to patient and caregiver's satisfaction as evidence by verbal acknowledgement.     Verbal and written VAD Education:     Please carefully read and review the following statement, and, sign to indicate you have been fully informed about the candidacy and evaluation process for the mechanical circulatory support device (MCSD) also known as the Ventricular Assist Device (VAD).     Why a VAD Is Needed   Heart failure is defined as a condition in which your heart is unable to pump enough blood to support the basic needs of your body. This can make you feel tired, have abnormal rhythms, and shortness of breath. Heart failure can also lead to failure of other organs (e.g. liver or kidneys). You are being offered this treatment option because you have a marked increase risk of irreversible end-organ damage or death. For this reason, you are being considered for placement of a Left Ventricular Assist Device (VAD) at [facility]. The heart pump is designed to take over the pumping action of your heart.   Prior to undergoing this procedure, it is important that you and your family understand the options, benefits, risks, and expectations associated with having a VAD. It is required that you and your proposed caregiver(s) understand and agree with the treatment plan and are willing to participate in the guidelines outlined in the following pages.     Bridge to transplant (BTT) is when a VAD is used to help support heart function for someone waiting for a heart transplant. This treatment plan is subject to change pending the results from your evaluation and your physicians decision. If your medical condition worsens after you receive the VAD, you may not be a transplant candidate.   The information within this  document pertains only to VAD therapy. You will receive information regarding heart transplantation allocation, procedures, and risks from the Pre-Transplant when appropriate.   OR   Destination therapy is when a VAD is used as a long-term treatment for patients who are not candidates for transplant, such as those with end-stage congestive heart failure. In these patients, the pumps are placed permanently to help the heart work better. This is subject to change pending the results from your evaluation and your physicians decision.     Types of Ventricular Assist Devices:   There are several different types of mechanical circulatory support devices:   -Left ventricular assist devices (LVAD) help the left side of the heart pump blood to the largest artery of the body, the aorta.     Your VAD Team may also talk with you about:   -Right ventricular assist devices (RVAD) help the right side of the heart pump blood to the lungs.   -Total Artificial Heart (HOA) that replaces the heart and pumps blood to the body.   A VAD is a long-term device utilized by patients for months to years. Some patients may receive another device prior to receiving a VAD depending on their treatment and health status.   When Is a VAD Used?   When medications can no longer help, and other surgical options have been exhausted, a physician may recommend a VAD. A VAD is used to assist the pumping action of a severely weakened heart. It works with your heart to improve and to increase blood flow; it does not replace your own heart. VADs are most often used for patients experiencing New York Heart Association (NYHA) Class III-IV heart failure symptoms.     Alternative Options:   If you are not found to be a candidate for VAD implantation or if you decide that a VAD is not the best option for you, you will continue to receive customary standard of care. You may also continue optimal medical management alone including the use of inotrope therapy. An  inotrope is an IV medication that helps the strength of the hearts contraction. However, the reason that you are being considered for VAD implantation is because optimal medical management has not been adequate and without a VAD, your condition is likely to deteriorate over time. While going straight to transplant may be a possibility, death is a possibility as well.   You May Not Be Eligible To Receive A VAD If You Are Found To Have Any Of The Following:      Any irreversible non-cardiac disease state with less than 2-year survival rate    Inadequate social support to be successful at home after surgery    Irreversible pulmonary disease or fixed pulmonary hypertension    Irreversible renal disease    Irreversible liver disease    Unresolved stroke or uncorrected cerebral vascular disease    A history of chronic noncompliance    Using illicit drugs or alcohol    Uncorrected thyroid disease    Significant right ventricular failure    Obstructive or restrictive cardiomyopathy    Amyloidosis    Active pericardial disease    Untreated aortic aneurysm    Irreversible cognitive dysfunction    History of psychiatric disease, uncontrolled affective disorder, or any cognitive dysfunction that may prevent you from managing self-care    Diabetes with severe retinopathy or peripheral neuropathy    Obesity with BMI (body mass index) greater than 35    Severe chronic malnutrition    Uncorrected blood disorders    Active uncontrolled infection    Pregnancy (positive pregnancy test)    HIV positive or immunosuppression that could result in device infection    Muscular dystrophy, MS or similar disease states    Active systemic infection    Cancer    Insufficient funding     Possible Benefits:   The overall goal is improved health and quality of life. In most cases, because circulation has been restored as a result of the VAD, you can expect to have more energy and also experience less heart failure  symptoms. Since VADs help deliver more oxygen rich blood, you may feel well enough to resume many of the usual activities and hobbies that you enjoy. In fact, many patients return to work and are no longer disabled, depending on the type of work they do. Be aware that you may lose your disability post VAD based on review of your records and disability benefits. It is important that you speak with a  so that you may plan for this should it occur.   The improved circulation may prolong life and may improve some organ damage caused by your heart failure. This is supported by some studies that have shown that VAD patients have a longer survival than patients treated with medications alone. Although the VAD can improve your chances for survival, the type and severity of your heart disease may outweigh any benefits from the device and you may still die.     Possible Surgical/ Anesthesia Risks:   As with any surgery or procedure, there are risks and the possibility of complications or death. There are also risks related to the operation itself and undergoing anesthesia, and risks related to the device itself. You will discuss the risks in detail with the Cardiac Surgeon who intends to perform your surgery. Your surgeon and anesthesia provider will review consent forms prior to surgery.     Operative Procedure Risks:   There are many risks with this operation including but not limited to: death, heart attack, stroke, nerve injury, blood clots, damage to arteries needing limb amputation, bleeding and hemorrhage, hemolysis, infection, development of new antibodies in your blood, mediastinitis, arrhythmia, right heart failure, heart block, or the need for pacemaker or ICD implantation. The need for re-operation for any reason may cause: renal, hepatic or pulmonary failure resulting in death or long-term need of ventilation or dialysis, and blood transfusion with its risk of HIV, and hepatitis. Studies  have also shown that patients may have problems with memory, attention, and speed of processing thoughts after a cardiac surgical procedure. Any of these complications will be explained to you in more detail if you desire. In addition to these potential complications, there may be other risks that are currently unknown.     Risks Related to VAD Therapy:   Include but not limited to: death, need for re-operation, device malfunction or device infection, renal failure requiring dialysis, blood clots, stroke, pain, or bleeding. These risks may lead to prolonged hospital stay or re-hospitalization. Pump exchange due to complications is a possibility. Patients may also experience a potential decrease in their quality of life including limitations of their normal activities. Patients may reach a point where quality of life is so impaired, that the decision to terminate VAD-support will need to be addressed. The longer you are on the device, the greater the chances that complications will develop.   Please see addendum for likelihood of these risks impacting you in your VAD therapy journey.     Evaluation Process:   There will be many people involved in the evaluation process to assure that this is the best choice for you. You will receive a number of tests and consultations. Some of the people that may help evaluate you include Heart Failure Physicians, Cardiac Surgeons, Social Workers and VAD Coordinators. During the evaluation process, you will be given education about the VAD and the care you would require. After the evaluation, the group will decide if you meet the criteria to have a VAD implanted. You may require or have already been implanted with a short-term VAD prior to surgery for a long-term VAD.     Care Team   Additionally, you will meet with a number of different team members to coordinate your care: financial counselor to discuss insurance and dressing supplies, research coordinators, anesthesiologist,  psychiatrist/psychologist, physical therapist or occupational therapist to discuss rehabilitation after VAD, and dietician to improve nutrition. Some patients may be referred to other services for consultation. These may include, but are not limited to: infectious disease doctor, gastroenterologist, nephrologist (kidney doctor), pulmonologist (lung doctor), hepatologist (liver doctor), or an ophthalmologist (eye doctor). It is recommended that you see your dentist to ensure no infection is present and all needed dental work is completed before surgery. Cavities and rotten teeth can cause an infection which can lead to death.   Testing is required to determine VAD candidacy. These include but are not limited to the following:   Laboratory studies of blood and urine, chest x-ray, abdominal ultrasound, CT scan, echocardiogram, cardiopulmonary treadmill, right heart catheterization, electrocardiogram, pulmonary function tests, colonoscopy or endoscopy, vascular studies, and pulmonary studies.     Device Choice:   VADs are currently approved by the Food and Drug Administration (FDA) to be used as Bridge to Transplantation (BTT) or Destination Therapy (DT). A full list will be provided for you and your family to review if desired. [facility] also participates in clinical trials with devices that are considered investigational, and are not yet FDA approved for BTT/DT. Your Surgeon and Cardiologist will discuss with you which device is the best option for you. VADs have four main parts: the implantable heart pump, a tube that passes through the skin of your abdomen (driveline), a controller (small computer) that controls the pump operation, and an external power source (batteries or power device). In addition, there are other VADs that are used temporarily when patients are in cardiogenic shock.   You have the right to refuse surgery at any time. This educational document consent will help you to make an informed  decision about your VAD option. If you decide this is not the best option for you , please make your physician aware. You and your family make the decision to proceed.   Pre-operative Care expectations:    Your surgeon will request a tentative operative date in your name in the event that VAD therapy is the right option defined by you, your family, and provider team. You may see this date on [facility].    Informed surgical and anesthesia consents will be completed prior to the procedure.    You will be admitted to the hospital a few days before the day of surgery for optimization before surgery (unless already hospitalized).    Intra-aortic balloon pump or impella will be placed before surgery    If you are listed for transplant, once you go for the VAD your listing status will change. This will be discussed with you by your Transplant team.    If you have an ICD (defibrillator), it will be turned off prior to surgery, and then turned back on after surgery. It will NOT be removed.     Surgical Procedure:   The surgical procedure to implant the VAD will require open-heart surgery and can take on average between 6-12 hours. The surgeon will need to make an incision down the front of your chest to reach your heart. You will have a breathing tube and ventilator while under general anesthesia. The VAD is placed below the heart and the surgeon will connect the pump to your heart and secure it in place with sutures. Once the pump is in place, the VAD along with your natural heart will resume pumping blood through your body. After the surgery is completed, you will receive care in the ICU.     Post-Operative Care Expectations:   Upon arrival to the ICU, you will receive close monitoring and support from the following medical mechanisms:    Upon arrival to ICU, please adhere to our ICU (intensive care unit) visiting hours. You will need to rest and we ask that family not stay the night for a few days.    Heart  monitor (telemetry) to monitor heart rate and rhythm.    A breathing tube (endotracheal tube) and ventilator to assist with breathing and maintain and open airway.    An oral-gastric tube will be utilized to keep the stomach empty when connected to suction, as well as to give the nursing staff the capability to administer oral medications directly into the stomach.    A Mc catheter to measure urinary output.    A [place] Catheter to measure pressures within the heart and lungs.    An arterial line catheter in order to measure arterial blood pressure.    Chest tubes to collect and measure drainage from surgery.    A VAD driveline that exits the skin in the abdominal area and is connected to the VAD power source.    Your chest may be left open for 24 to 72 hours after implantation, after which you will be taken back to the operating room to close your chest.     You will receive medications for sedation and to control your pain in order to achieve a tolerable level of comfort. You will also be on IV medications until your blood pressure and fluid status are stable. Your home medications will be resumed as soon as possible if still medically relevant. In addition to your previous taken medications, patients with VADs are commonly prescribed medications for anticoagulation/anti-platelet, antibiotics, blood pressure, and vitamin/mineral supplements. Your length of stay in the ICU will depend on how fast you recover. Once you are more stable, breathing on your own with your lines and tubes out, you will be transferred to a general care unit where you can expect to stay for another 1-3 weeks. On average, your total length of stay will be about three or four weeks after your surgery. During this time, it is expected that you and your family will begin to learn to manage   the device and learn how to manage your care at home. Most patients are able to return home after VAD implantation, but this cannot be  guaranteed. Complications may require a prolonged period of hospitalization, long term acute care facility, or inpatient rehabilitation. Be aware, if you are unattended and the device fails, you may not be able to perform the emergency procedures yourself, which could result in death and/or blood clots in the device.     Education:   Verbal, written, and visual educational materials are provided throughout your hospitalization and are available to anyone involved in your care at home. You and your caregiver(s) will be trained by a VAD Coordinator on how to manage your care and device. Other staff such as your bedside Nurse, the Occupational and Physical Therapists will also provide training to you. You and your caregiver(s) must show ability to manage the device, understand how it operates, troubleshoot problems, and care for your driveline exit site. It is expected that a caregiver(s) will be present and available while you are in the hospital to learn how to manage the device and how to care for you when you are at home. The education will be an ongoing process while you are here at the hospital. Prior to your expected discharge, your family and/or caregivers will be required to show competency in the care and management of you and your VAD. In addition, a VAD Coordinator will ensure that your local fire department, emergency personnel, and any other community members will be given education materials and training as necessary. Your home must have consistent electricity and phone services; the outlets must be three pronged and grounded. Any additional safety needs are arranged during this time. You will need to have your glasses and hearing aids at the hospital in order to be educated, as soon as possible.     Caregiver Requirements:   VAD implantation is based on suitability, need, and a committed caregiver. The caregiver must agree to certain responsibilities for the VAD implant process to continue. This is not  negotiable. The VAD patient may be completely dependent on the caregiver. While the patient is in the hospital, the caregiver is expected to visit daily, preferably at a time between 8 am- 4 pm. The caregiver will need to learn the dressing change process by the nurses and consistently demonstrate the ability to perform VAD dressing change in addition to helping the patient learn about the VAD. If the patient is unable to meet education goals before discharge, they may need 24-hour care. 24-hour caregivers may experience increased stress in their day-to-day life resulting from caring for a loved one with a VAD. There are support groups available to help you through living with a VAD.   The patient will not be able to drive for several months. The caregiver will need to drive the patient to appointments, as frequently as two to three times weekly at first. The caregiver will need to assist the patient with medication management. The caregiver will need to encourage the patient to document VAD numbers daily and know when to call for a problem.   Education begins now and is on-going throughout the VAD patients life.   The VAD team recognizes that VAD patients have an increased satisfactory outcome with a dedicated and committed caregiver. The caregiver has a tremendous responsibility. It is essential for you, the caregiver, to understand what is expected prior to moving forward. Any concerns about being the caregiver should be discussed with the , the VAD coordinator, or the physician.     Discharge Process:   Your daily progress will be followed by a team of people involved in your care including your Surgeon, Cardiologist, VAD Coordinators, Staff Nurses, Nurse Practitioners, Physician Assistants, Physical/Occupational Therapy, Social Workers, and a Discharge Planner. They will monitor your recovery and help you to adjust to life with a VAD. You will need to demonstrate the ability to care for yourself,  such as grooming and exercise. You will also need to demonstrate the ability to care for your VAD, the equipment, and alarms. You must have a thermometer, weight scale, flashlight, and a blood pressure cuff at your home. Most patients return home however, some patients choose to live with a caregiver or need a rehabilitation facility for a short period before returning home. If insurance allows, a Home Health nurse will be recommended to come to your home and assist you in your care when you return home. The length of time that the Visiting Nurse will come to your home will depend on your overall recovery. It is recommended  after you return home that you enroll in a Cardiac Rehab program to continue to improve your physical health.     Follow up care:   After you are discharged, you will follow up with your Surgeon, your Heart Failure Cardiologist and your VAD coordinator. They will collaboratively care for you and make decisions about your treatment. Typically, your first visit will be 1 week after discharge. We will see you every week for 3-4 weeks, followed by every 2 weeks for 1 month, then monthly thereafter while you have the VAD. Once you are considered a stable established patient, your Physicians may decide that you can follow-up every 2-3 months. Along with seeing your Cardiologist and Surgeon, you will have laboratory testing, and other physiological testing done on a regular basis in order to monitor and maintain your progress and health. The types of testing that you may need and the frequency will be decided by your Physicians but can include blood tests, EKG, Echocardiogram, Right Heart Catheterization, V02 Treadmill Stress Test, and Implantable Cardioverter Defibrillator (ICD) device check. If you have received an investigational VAD, you will have other testing that will be required for the research study. You will be required to take anticoagulation medications, also known as blood thinning  medications (coumadin, warfarin). You will   be required to have frequent blood draws, up to 2-3 times a week, in order to monitor your blood count and blood thinning agents. You will also be in frequent contact with a VAD Coordinator who will make phone calls to assess how you are doing at home and assist you with any problems that may arise. A VAD Coordinator and a Heart Failure Cardiologist are also available 24 hours a day in the event that you have an emergency. On average, you can expect that within 12 weeks after surgery, you will be able to return to most activities, with the permission of your VAD Team.     Lifestyle Changes and Prohibited Activities:   You will have limitations and can resume most usual activities. Certain activities are hazardous or fatal after implant. Persons with implantable VADs must not allow their controller/computer and electrical equipment to submerge in water. Showering is possible with proper protective equipment. You may only resume showering once your driveline has healed and your VAD coordinator gives their permission. Swimming and baths are prohibited. You cannot sleep on your stomach or the side the driveline is exiting your abdomen. Contact sports, repetitive jumping, or impact with an airbag are examples of activities that may cause trauma to the pump attachments and must be avoided.   You may be sexually active but must care for your driveline. Female patients cannot get pregnant. Medical care after implant includes lifetime follow up to monitor device function and health status. You may not have a magnetic resonance imaging (MRI) test because of the magnetic fields. You may not vacuum, touch television or computer screens, or take hot clothes out of the dryer due to the static electricity. VAD therapy requires significant self-care responsibility and a willingness to participate with you VAD team. Driveline exit site dressings must be performed daily using sterile  technique outlined in your care of driveline documentation or as directed by your VAD team. Care of your driveline must be done daily by yourself or caregiver. Maintenance care of the device components, batteries, and driveline is necessary to prevent pump failure, infections, or other serious complications. You will continue to take medications for your heart failure although the dose and or medication name may change. You may continue to take your diuretic such as lasix or demedex. You may also continue to be on a fluid restriction. You may drive once approved by your VAD team. You may be able to travel with your VAD, depending on the situation.     VAD Equipment:   Along with the device that is implanted inside your body, you will have a number of other external pieces of equipment that will require care and maintenance. You will have a driveline that exits your body through your abdomen that will power a controller, which is the computer component that tells the heart pump how to perform. The controller will also tell you about alarms, sounds, and words, on how your pump is operating and if there are any problems. In order to power the device and the controller, you will have batteries and a battery charger and/or power device. The batteries allow you to be mobile and move freely without being attached to outlet power. The  or power device allows   you to be connected to power for long periods of time such as when you are sleeping. Different VADs have similar pieces of equipment, but will vary depending on the device you receive. You will receive education and teaching before you leave the hospital to make sure you understand clearly how to operate the equipment and troubleshoot problems that may occur.     Confidentiality:   Hospital personnel who are involved in the course of your care may review your medical record. Communication between you and [facility] remains confidential. If you do become a  candidate for transplant, data about your case, which will include your identity, will be sent to [employer] ([employer]) and may be sent to other places involved in the transplant process as permitted by law. Data about your VAD, which will include your identity, will be shared with the  of the device. Device  may be involved in your care alongside your VAD Team in the hospital and/or clinic setting. Your information may also be entered into a registry for pump implants, known as INTERMACS. Your participation in this is voluntary, and will be discussed at greater length prior to implant.     End of Life:   If you are approaching the end of life, the VAD can be turned off. You may be in a hospital, home, or hospice setting. If you become very sick and do not have a chance to survive, we may talk about stopping the pump. The doctor would talk to you or your family about what is right for you. It is helpful to talk to your family about your goals before surgery so they know what your wishes are. A member of our Goals of Care team will visit you before surgery to help you learn your goals. You have the right to have the device turned off or to decline pump exchange if your pump fails or malfunctions.     Device Return:   At the time of either transplant or death, the surgeon may need to remove the device to return to the . You or a family member may need to sign a separate consent for removal of the device.   Questions   We encourage you to learn everything you can about the potential benefits and risks of having a VAD. If you or your family has any questions, you should feel free to contact your Transplant Coordinator or VAD Coordinator.   By signing this form, you understand and have reviewed the implant procedure as well as the potential benefits and risks involved with the getting a VAD. You also acknowledge and understand the care that will be  required to maintain this device and yourself, including changes in your lifestyle, and impact on your independence.

## 2020-01-24 NOTE — PLAN OF CARE
CMICU DAILY GOALS       A: Awake    RASS: Goal - RASS Goal: 0-->alert and calm  Actual - RASS (Mcmillan Agitation-Sedation Scale): 0-->alert and calm   Restraint necessity: Clinical Justification: Removing medical devices  B: Breath   SBT: Not intubated   C: Coordinate A & B, analgesics/sedatives   Pain: managed    SAT: Not intubated  D: Delirium   CAM-ICU: Overall CAM-ICU: Negative  E: Early Mobility   MOVE Screen: Pass   Activity: Activity Management: activity adjusted per tolerance  FAS: Feeding/Nutrition   Diet order: Diet/Nutrition Received: consistent carb/diabetic diet,   Fluid restriction: Fluid Requirement: 1500 cc FR  T: Thrombus   DVT prophylaxis: VTE Required Core Measure: Pharmacological prophylaxis initiated/maintained  H: HOB Elevation   Head of Bed (HOB): HOB at 15 degrees  U: Ulcer Prophylaxis   GI: yes  G: Glucose control   uncontrolled    S: Skin   Bundle compliance: yes   Bathing/Skin Care: back care, bath, chlorhexidine, bath, complete, dressed/undressed, incontinence care, linen changed, nail care Date: 1/23  B: Bowel Function   no issues   I: Indwelling Catheters   Mc necessity:      Urethral Catheter 01/15/20 2030 Straight-tip 16 Fr.-Reason for Continuing Urinary Catheterization: Critically ill in ICU requiring intensive monitoring   CVC necessity: No   IPAD offered: No  D: De-escalation Antibx   N/a     Plan for the day   Continue to diurese, SvO2 72, CVP 18,17,19, possible step down   Family/Goals of care/Code Status   Code Status: Full Code     No acute events throughout day, VS and assessment per flow sheet, patient progressing towards goals as tolerated, plan of care reviewed with Saba Lott and family, all concerns addressed, will continue to monitor.

## 2020-01-24 NOTE — CARE UPDATE
BG goal 140 -180     Prandial excursions present as noted per chart review. However, patient is responding appropriately to SQ PRN Novolog correction. Will consider scheduled Novolog and/or DPP-4 if prandial excursions remain consistent.     Low Dose SQ Insulin Correction Scale.  BG Monitoring AC/HS     ** Please call Endocrine for any BG related issues **  ** Please notify Endocrine for any change and/or advance in diet**    Discharge Planning:     TBD. Please notify endocrinology prior to discharge.

## 2020-01-24 NOTE — SUBJECTIVE & OBJECTIVE
Interval History: CVP 19 this morning. SVO2 62.     Continuous Infusions:   DOBUTamine 5 mcg/kg/min (01/24/20 0900)    furosemide (LASIX) 10 mg/mL infusion (non-titrating) 20 mg/hr (01/24/20 0918)    heparin (porcine) in D5W 16 Units/kg/hr (01/24/20 0918)     Scheduled Meds:   acetaminophen  1,000 mg Oral Q8H    atorvastatin  40 mg Oral Daily    cetirizine  10 mg Oral Daily    famotidine  40 mg Oral Daily    hydrALAZINE  50 mg Oral Q8H    isosorbide dinitrate  10 mg Oral Q8H    magnesium oxide  800 mg Oral BID    magnesium sulfate IVPB  1 g Intravenous Once    melatonin  6 mg Oral Nightly    polyethylene glycol  17 g Oral Daily    potassium chloride  40 mEq Oral BID    senna-docusate 8.6-50 mg  2 tablet Oral BID    triamcinolone acetonide 0.5%   Topical (Top) TID     PRN Meds:albuterol, bisacodyl, Dextrose 10% Bolus, Dextrose 10% Bolus, glucagon (human recombinant), glucose, glucose, heparin (PORCINE), heparin (PORCINE), hydrOXYzine HCl, insulin aspart U-100, sodium chloride 0.9%, traMADol    Review of patient's allergies indicates:   Allergen Reactions    Iodine and iodide containing products      Objective:     Vital Signs (Most Recent):  Temp: 98.1 °F (36.7 °C) (01/24/20 0701)  Pulse: (!) 112 (01/24/20 0833)  Resp: 20 (01/24/20 0833)  BP: 104/67 (01/24/20 0800)  SpO2: 96 % (01/24/20 0833) Vital Signs (24h Range):  Temp:  [97.8 °F (36.6 °C)-98.1 °F (36.7 °C)] 98.1 °F (36.7 °C)  Pulse:  [105-123] 112  Resp:  [17-36] 20  SpO2:  [90 %-100 %] 96 %  BP: ()/(53-91) 104/67     Patient Vitals for the past 72 hrs (Last 3 readings):   Weight   01/24/20 0300 88.5 kg (195 lb 1.7 oz)   01/23/20 1200 88.5 kg (195 lb)   01/23/20 0300 88.6 kg (195 lb 5.2 oz)     Body mass index is 30.56 kg/m².      Intake/Output Summary (Last 24 hours) at 1/24/2020 1009  Last data filed at 1/24/2020 0900  Gross per 24 hour   Intake 1837.11 ml   Output 2870 ml   Net -1032.89 ml       Hemodynamic Parameters:       Telemetry:  NSR    Physical Exam  Constitutional: He is oriented to person, place, and time. He appears well-developed and well-nourished.   HENT:   Head: Normocephalic.   Eyes: Pupils are equal, round, and reactive to light. EOM are normal.   Neck: Normal range of motion. Neck supple.   Cardiovascular: Normal rate and regular rhythm.   Pulmonary/Chest: Effort normal and breath sounds normal.   Decreased BS at bases    Abdominal: Soft. Bowel sounds are normal.   Musculoskeletal: Normal range of motion.   Neurological: He is alert and oriented to person, place, and time.   Skin: Skin is warm.   Significant Labs:  CBC:  Recent Labs   Lab 01/22/20  0433 01/23/20  0400 01/24/20  0330   WBC 9.92 10.30 11.27   RBC 4.36* 4.40* 4.36*   HGB 11.7* 12.1* 11.8*   HCT 36.2* 36.3* 36.3*   * 184 231   MCV 83 83 83   MCH 26.8* 27.5 27.1   MCHC 32.3 33.3 32.5     BNP:  Recent Labs   Lab 01/21/20  0755 01/22/20  1752   BNP 3,053* 2,342*     CMP:  Recent Labs   Lab 01/22/20  1616 01/23/20  0400 01/23/20  1514 01/24/20  0330   * 281*  281* 213* 258*   CALCIUM 9.7 9.8  9.8 9.5 9.4   ALBUMIN 3.2* 3.5 3.3*  --    PROT 7.4 7.8 7.7  --    * 130*  130* 131* 129*   K 4.1 3.8  3.8 3.9 4.3   CO2 41* 37*  37* 39* 38*   CL 81* 76*  76* 79* 79*   BUN 21* 22*  22* 25* 29*   CREATININE 1.7* 1.8*  1.8* 1.8* 1.7*   ALKPHOS 108 107 106  --    ALT 16 17 18  --    AST 28 28 31  --    BILITOT 1.1* 1.4* 1.1*  --       Coagulation:   Recent Labs   Lab 01/22/20  2208 01/23/20  0400 01/24/20  0330   INR  --  1.4*  --    APTT 43.9* 47.2* 49.8*     LDH:  Recent Labs   Lab 01/22/20  1752   *     Microbiology:  Microbiology Results (last 7 days)     ** No results found for the last 168 hours. **          I have reviewed all pertinent labs within the past 24 hours.    Estimated Creatinine Clearance: 52.2 mL/min (A) (based on SCr of 1.7 mg/dL (H)).    Diagnostic Results:  I have reviewed and interpreted all pertinent imaging results/findings  within the past 24 hours.
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
## 2020-01-24 NOTE — NURSING
Per Dr Ruano, colonoscopy will not be ordered as part of evaluation as pt is unlikely to be approved for OHT and is currently not stable enough to consider colonoscopy prep & procedure.   Will plan to discuss pt's case at selection next week

## 2020-01-24 NOTE — ASSESSMENT & PLAN NOTE
BG goal 140 - 180     BG is reasonably controlled with minimal to no use of insulin therapy.   Low Dose SQ Insulin Correction Scale PRN BG excursions.   BG Monitoring AC/HS     ** Please call Endocrine for any BG related issues **  ** Please notify Endocrine for any change and/or advance in diet**    Discharge Planning:    TBD. Please notify endocrinology prior to discharge.

## 2020-01-25 LAB
ALLENS TEST: ABNORMAL
AMPHETAMINES SERPL QL: NEGATIVE
ANION GAP SERPL CALC-SCNC: 12 MMOL/L (ref 8–16)
ANION GAP SERPL CALC-SCNC: 13 MMOL/L (ref 8–16)
ANION GAP SERPL CALC-SCNC: 14 MMOL/L (ref 8–16)
APTT BLDCRRT: 38.1 SEC (ref 21–32)
APTT BLDCRRT: 51.3 SEC (ref 21–32)
APTT BLDCRRT: 79.4 SEC (ref 21–32)
BARBITURATES SERPL QL SCN: NEGATIVE
BASOPHILS # BLD AUTO: 0.04 K/UL (ref 0–0.2)
BASOPHILS NFR BLD: 0.4 % (ref 0–1.9)
BENZODIAZ SERPL QL SCN: NEGATIVE
BUN SERPL-MCNC: 34 MG/DL (ref 6–20)
BUN SERPL-MCNC: 36 MG/DL (ref 6–20)
BUN SERPL-MCNC: 37 MG/DL (ref 6–20)
BZE SERPL QL: NEGATIVE
CALCIUM SERPL-MCNC: 10 MG/DL (ref 8.7–10.5)
CALCIUM SERPL-MCNC: 10 MG/DL (ref 8.7–10.5)
CALCIUM SERPL-MCNC: 9.7 MG/DL (ref 8.7–10.5)
CARBOXYTHC SERPL QL SCN: NEGATIVE
CHLORIDE SERPL-SCNC: 83 MMOL/L (ref 95–110)
CHLORIDE SERPL-SCNC: 83 MMOL/L (ref 95–110)
CHLORIDE SERPL-SCNC: 85 MMOL/L (ref 95–110)
CO2 SERPL-SCNC: 34 MMOL/L (ref 23–29)
CO2 SERPL-SCNC: 36 MMOL/L (ref 23–29)
CO2 SERPL-SCNC: 36 MMOL/L (ref 23–29)
CREAT SERPL-MCNC: 2 MG/DL (ref 0.5–1.4)
CREAT SERPL-MCNC: 2.1 MG/DL (ref 0.5–1.4)
CREAT SERPL-MCNC: 2.2 MG/DL (ref 0.5–1.4)
DELSYS: ABNORMAL
DELSYS: ABNORMAL
DIFFERENTIAL METHOD: ABNORMAL
EOSINOPHIL # BLD AUTO: 0.2 K/UL (ref 0–0.5)
EOSINOPHIL NFR BLD: 1.8 % (ref 0–8)
ERYTHROCYTE [DISTWIDTH] IN BLOOD BY AUTOMATED COUNT: 17.4 % (ref 11.5–14.5)
EST. GFR  (AFRICAN AMERICAN): 37.6 ML/MIN/1.73 M^2
EST. GFR  (AFRICAN AMERICAN): 39.8 ML/MIN/1.73 M^2
EST. GFR  (AFRICAN AMERICAN): 42.2 ML/MIN/1.73 M^2
EST. GFR  (NON AFRICAN AMERICAN): 32.5 ML/MIN/1.73 M^2
EST. GFR  (NON AFRICAN AMERICAN): 34.4 ML/MIN/1.73 M^2
EST. GFR  (NON AFRICAN AMERICAN): 36.5 ML/MIN/1.73 M^2
ETHANOL SERPL QL SCN: NEGATIVE
FLOW: 4
GAMMA INTERFERON BACKGROUND BLD IA-ACNC: 0.03 IU/ML
GLUCOSE SERPL-MCNC: 137 MG/DL (ref 70–110)
GLUCOSE SERPL-MCNC: 241 MG/DL (ref 70–110)
GLUCOSE SERPL-MCNC: 242 MG/DL (ref 70–110)
HCO3 UR-SCNC: 38.7 MMOL/L (ref 24–28)
HCO3 UR-SCNC: 43.3 MMOL/L (ref 24–28)
HCO3 UR-SCNC: 45.2 MMOL/L (ref 24–28)
HCT VFR BLD AUTO: 38 % (ref 40–54)
HGB BLD-MCNC: 12 G/DL (ref 14–18)
IMM GRANULOCYTES # BLD AUTO: 0.04 K/UL (ref 0–0.04)
IMM GRANULOCYTES NFR BLD AUTO: 0.4 % (ref 0–0.5)
LYMPHOCYTES # BLD AUTO: 1.8 K/UL (ref 1–4.8)
LYMPHOCYTES NFR BLD: 17 % (ref 18–48)
M TB IFN-G CD4+ BCKGRND COR BLD-ACNC: 0.05 IU/ML
MAGNESIUM SERPL-MCNC: 2.3 MG/DL (ref 1.6–2.6)
MAGNESIUM SERPL-MCNC: 2.3 MG/DL (ref 1.6–2.6)
MCH RBC QN AUTO: 26.4 PG (ref 27–31)
MCHC RBC AUTO-ENTMCNC: 31.6 G/DL (ref 32–36)
MCV RBC AUTO: 84 FL (ref 82–98)
METHADONE SERPL QL SCN: NEGATIVE
METHEMOGLOBIN: 0.3 % (ref 0–3)
MITOGEN IGNF BCKGRD COR BLD-ACNC: 3.49 IU/ML
MODE: ABNORMAL
MONOCYTES # BLD AUTO: 0.9 K/UL (ref 0.3–1)
MONOCYTES NFR BLD: 8.5 % (ref 4–15)
NEUTROPHILS # BLD AUTO: 7.6 K/UL (ref 1.8–7.7)
NEUTROPHILS NFR BLD: 71.9 % (ref 38–73)
NRBC BLD-RTO: 0 /100 WBC
OPIATES SERPL QL SCN: NEGATIVE
PCO2 BLDA: 54.3 MMHG (ref 35–45)
PCO2 BLDA: 56.7 MMHG (ref 35–45)
PCO2 BLDA: 59.5 MMHG (ref 35–45)
PCP SERPL QL SCN: NEGATIVE
PH SMN: 7.44 [PH] (ref 7.35–7.45)
PH SMN: 7.49 [PH] (ref 7.35–7.45)
PH SMN: 7.51 [PH] (ref 7.35–7.45)
PHOSPHATE SERPL-MCNC: 5 MG/DL (ref 2.7–4.5)
PLATELET # BLD AUTO: 311 K/UL (ref 150–350)
PMV BLD AUTO: 10.4 FL (ref 9.2–12.9)
PO2 BLDA: 25 MMHG (ref 40–60)
PO2 BLDA: 28 MMHG (ref 40–60)
PO2 BLDA: 33 MMHG (ref 40–60)
POC BE: 15 MMOL/L
POC BE: 20 MMOL/L
POC BE: 22 MMOL/L
POC SATURATED O2: 49 % (ref 95–100)
POC SATURATED O2: 52 % (ref 95–100)
POC SATURATED O2: 68 % (ref 95–100)
POC TCO2: 40 MMOL/L (ref 24–29)
POC TCO2: 45 MMOL/L (ref 24–29)
POC TCO2: 47 MMOL/L (ref 24–29)
POCT GLUCOSE: 117 MG/DL (ref 70–110)
POCT GLUCOSE: 135 MG/DL (ref 70–110)
POCT GLUCOSE: 141 MG/DL (ref 70–110)
POCT GLUCOSE: 224 MG/DL (ref 70–110)
POTASSIUM SERPL-SCNC: 4.3 MMOL/L (ref 3.5–5.1)
POTASSIUM SERPL-SCNC: 4.5 MMOL/L (ref 3.5–5.1)
POTASSIUM SERPL-SCNC: 4.7 MMOL/L (ref 3.5–5.1)
POTASSIUM SERPL-SCNC: 4.7 MMOL/L (ref 3.5–5.1)
PROPOXYPH SERPL QL: NEGATIVE
RBC # BLD AUTO: 4.55 M/UL (ref 4.6–6.2)
SAMPLE: ABNORMAL
SITE: ABNORMAL
SODIUM SERPL-SCNC: 129 MMOL/L (ref 136–145)
SODIUM SERPL-SCNC: 130 MMOL/L (ref 136–145)
SODIUM SERPL-SCNC: 131 MMOL/L (ref 136–145)
SODIUM SERPL-SCNC: 135 MMOL/L (ref 136–145)
SP02: 100
TB GOLD PLUS: NEGATIVE
TB2 - NIL: 0.07 IU/ML
WBC # BLD AUTO: 10.5 K/UL (ref 3.9–12.7)

## 2020-01-25 PROCEDURE — 27000221 HC OXYGEN, UP TO 24 HOURS: Mod: NTX

## 2020-01-25 PROCEDURE — 25000003 PHARM REV CODE 250: Mod: NTX | Performed by: NURSE PRACTITIONER

## 2020-01-25 PROCEDURE — 84100 ASSAY OF PHOSPHORUS: CPT | Mod: NTX

## 2020-01-25 PROCEDURE — 25000003 PHARM REV CODE 250: Mod: NTX | Performed by: STUDENT IN AN ORGANIZED HEALTH CARE EDUCATION/TRAINING PROGRAM

## 2020-01-25 PROCEDURE — 83050 HGB METHEMOGLOBIN QUAN: CPT | Mod: NTX

## 2020-01-25 PROCEDURE — 85025 COMPLETE CBC W/AUTO DIFF WBC: CPT | Mod: NTX

## 2020-01-25 PROCEDURE — 63600175 PHARM REV CODE 636 W HCPCS: Mod: NTX | Performed by: HOSPITALIST

## 2020-01-25 PROCEDURE — 63600175 PHARM REV CODE 636 W HCPCS: Mod: NTX | Performed by: STUDENT IN AN ORGANIZED HEALTH CARE EDUCATION/TRAINING PROGRAM

## 2020-01-25 PROCEDURE — 99291 CRITICAL CARE FIRST HOUR: CPT | Mod: NTX,,, | Performed by: INTERNAL MEDICINE

## 2020-01-25 PROCEDURE — 25000003 PHARM REV CODE 250: Mod: NTX | Performed by: INTERNAL MEDICINE

## 2020-01-25 PROCEDURE — 63600175 PHARM REV CODE 636 W HCPCS: Mod: NTX | Performed by: INTERNAL MEDICINE

## 2020-01-25 PROCEDURE — 80048 BASIC METABOLIC PNL TOTAL CA: CPT | Mod: NTX

## 2020-01-25 PROCEDURE — 20000000 HC ICU ROOM: Mod: NTX

## 2020-01-25 PROCEDURE — 83735 ASSAY OF MAGNESIUM: CPT | Mod: NTX

## 2020-01-25 PROCEDURE — 94761 N-INVAS EAR/PLS OXIMETRY MLT: CPT | Mod: NTX

## 2020-01-25 PROCEDURE — 25000003 PHARM REV CODE 250: Mod: NTX | Performed by: HOSPITALIST

## 2020-01-25 PROCEDURE — 80048 BASIC METABOLIC PNL TOTAL CA: CPT | Mod: 91,NTX

## 2020-01-25 PROCEDURE — 99291 PR CRITICAL CARE, E/M 30-74 MINUTES: ICD-10-PCS | Mod: NTX,,, | Performed by: INTERNAL MEDICINE

## 2020-01-25 PROCEDURE — 99900035 HC TECH TIME PER 15 MIN (STAT): Mod: NTX

## 2020-01-25 PROCEDURE — 82803 BLOOD GASES ANY COMBINATION: CPT | Mod: NTX

## 2020-01-25 PROCEDURE — 85730 THROMBOPLASTIN TIME PARTIAL: CPT | Mod: NTX

## 2020-01-25 PROCEDURE — 63600367 HC NITRIC OXIDE PER HOUR: Mod: NTX

## 2020-01-25 PROCEDURE — 85730 THROMBOPLASTIN TIME PARTIAL: CPT | Mod: 91,NTX

## 2020-01-25 PROCEDURE — 25000003 PHARM REV CODE 250: Mod: NTX | Performed by: PHYSICIAN ASSISTANT

## 2020-01-25 RX ORDER — FUROSEMIDE 10 MG/ML
80 INJECTION INTRAMUSCULAR; INTRAVENOUS ONCE
Status: COMPLETED | OUTPATIENT
Start: 2020-01-25 | End: 2020-01-25

## 2020-01-25 RX ORDER — POTASSIUM CHLORIDE 20 MEQ/1
20 TABLET, EXTENDED RELEASE ORAL ONCE
Status: DISCONTINUED | OUTPATIENT
Start: 2020-01-25 | End: 2020-01-25

## 2020-01-25 RX ADMIN — ACETAMINOPHEN 1000 MG: 500 TABLET ORAL at 06:01

## 2020-01-25 RX ADMIN — FUROSEMIDE 30 MG/HR: 10 INJECTION, SOLUTION INTRAMUSCULAR; INTRAVENOUS at 08:01

## 2020-01-25 RX ADMIN — FUROSEMIDE 80 MG: 10 INJECTION, SOLUTION INTRAMUSCULAR; INTRAVENOUS at 02:01

## 2020-01-25 RX ADMIN — CETIRIZINE HYDROCHLORIDE 10 MG: 10 TABLET, FILM COATED ORAL at 09:01

## 2020-01-25 RX ADMIN — FAMOTIDINE 40 MG: 40 POWDER, FOR SUSPENSION ORAL at 09:01

## 2020-01-25 RX ADMIN — CHLOROTHIAZIDE SODIUM 250 MG: 500 INJECTION, POWDER, LYOPHILIZED, FOR SOLUTION INTRAVENOUS at 02:01

## 2020-01-25 RX ADMIN — Medication 6 MG: at 09:01

## 2020-01-25 RX ADMIN — HEPARIN SODIUM AND DEXTROSE 18 UNITS/KG/HR: 10000; 5 INJECTION INTRAVENOUS at 05:01

## 2020-01-25 RX ADMIN — POTASSIUM CHLORIDE 40 MEQ: 1500 TABLET, EXTENDED RELEASE ORAL at 09:01

## 2020-01-25 RX ADMIN — ATORVASTATIN CALCIUM 40 MG: 20 TABLET, FILM COATED ORAL at 09:01

## 2020-01-25 RX ADMIN — ACETAMINOPHEN 1000 MG: 500 TABLET ORAL at 09:01

## 2020-01-25 RX ADMIN — ISOSORBIDE DINITRATE 10 MG: 10 TABLET ORAL at 06:01

## 2020-01-25 RX ADMIN — Medication 800 MG: at 09:01

## 2020-01-25 RX ADMIN — ISOSORBIDE DINITRATE 10 MG: 10 TABLET ORAL at 09:01

## 2020-01-25 RX ADMIN — HYDRALAZINE HYDROCHLORIDE 50 MG: 50 TABLET ORAL at 01:01

## 2020-01-25 RX ADMIN — HYDRALAZINE HYDROCHLORIDE 50 MG: 50 TABLET ORAL at 06:01

## 2020-01-25 RX ADMIN — FUROSEMIDE 80 MG: 10 INJECTION, SOLUTION INTRAMUSCULAR; INTRAVENOUS at 06:01

## 2020-01-25 RX ADMIN — DOBUTAMINE IN DEXTROSE 5 MCG/KG/MIN: 200 INJECTION, SOLUTION INTRAVENOUS at 08:01

## 2020-01-25 RX ADMIN — TRIAMCINOLONE ACETONIDE: 5 CREAM TOPICAL at 02:01

## 2020-01-25 RX ADMIN — ISOSORBIDE DINITRATE 10 MG: 10 TABLET ORAL at 01:01

## 2020-01-25 RX ADMIN — HYDRALAZINE HYDROCHLORIDE 50 MG: 50 TABLET ORAL at 09:01

## 2020-01-25 RX ADMIN — HEPARIN SODIUM AND DEXTROSE 15 UNITS/KG/HR: 10000; 5 INJECTION INTRAVENOUS at 01:01

## 2020-01-25 RX ADMIN — CHLOROTHIAZIDE SODIUM 250 MG: 500 INJECTION, POWDER, LYOPHILIZED, FOR SOLUTION INTRAVENOUS at 10:01

## 2020-01-25 RX ADMIN — INSULIN ASPART 2 UNITS: 100 INJECTION, SOLUTION INTRAVENOUS; SUBCUTANEOUS at 11:01

## 2020-01-25 RX ADMIN — ACETAMINOPHEN 1000 MG: 500 TABLET ORAL at 01:01

## 2020-01-25 RX ADMIN — TRIAMCINOLONE ACETONIDE: 5 CREAM TOPICAL at 09:01

## 2020-01-25 NOTE — PLAN OF CARE
CMICU DAILY GOALS   CVP 20,17,17  SvO2 49. MD aware.   UOP ~2 L overnight  Accuchecks AC/HS     A: Awake    RASS: Goal - RASS Goal: 0-->alert and calm  Actual - RASS (Mcmillan Agitation-Sedation Scale): 0-->alert and calm   Restraint necessity: Clinical Justification: Removing medical devices  B: Breath   SBT: Not intubated   C: Coordinate A & B, analgesics/sedatives   Pain: managed    SAT: Not intubated  D: Delirium   CAM-ICU: Overall CAM-ICU: Negative  E: Early Mobility   MOVE Screen: Pass   Activity: Activity Management: activity adjusted per tolerance  FAS: Feeding/Nutrition   Diet order: Diet/Nutrition Received: consistent carb/diabetic diet,   Fluid restriction: Fluid Requirement: 1500FR  T: Thrombus   DVT prophylaxis: VTE Required Core Measure: Pharmacological prophylaxis initiated/maintained  H: HOB Elevation   Head of Bed (HOB): HOB at 30 degrees  U: Ulcer Prophylaxis   GI: yes  G: Glucose control   uncontrolled    S: Skin   Bundle compliance: yes   Bathing/Skin Care: back care, bath, chlorhexidine, bath, complete, linen changed, dressed/undressed, nail care Date: 1/23  B: Bowel Function   no issues   I: Indwelling Catheters   Mc necessity:      Urethral Catheter 01/15/20 2030 Straight-tip 16 Fr.-Reason for Continuing Urinary Catheterization: Critically ill in ICU requiring intensive monitoring   CVC necessity: No   IPAD offered: No  D: De-escalation Antibx   N/a     Plan for the day   Monitor SvO2 and CVP   Family/Goals of care/Code Status   Code Status: Full Code     No acute events throughout day, VS and assessment per flow sheet, patient progressing towards goals as tolerated, plan of care reviewed with Saba Lott and family, all concerns addressed, will continue to monitor.

## 2020-01-25 NOTE — PROGRESS NOTES
[facility]  Heart Transplant  Progress Note    Patient Name: Saba Lott  MRN: [id number]  Admission Date: 1/15/2020  Hospital Length of Stay: 10 days  Attending Physician: Lian Daniel MD  Primary Care Provider: Primary Doctor No  Principal Problem:Cardiogenic shock    Subjective:     Interval History: CVP went up to 20 last night for which he recived a dose of Diuril. CVP at 4 AM is 17 for which Radha was increased to 10 and lasix drip increased to 30. CVP this morning is 12     Continuous Infusions:   DOBUTamine 5 mcg/kg/min (01/25/20 0800)    furosemide (LASIX) 10 mg/mL infusion (non-titrating) 30 mg/hr (01/25/20 0814)    heparin (porcine) in D5W 18 Units/kg/hr (01/25/20 0800)    nitric oxide gas       Scheduled Meds:   acetaminophen  1,000 mg Oral Q8H    atorvastatin  40 mg Oral Daily    cetirizine  10 mg Oral Daily    famotidine  40 mg Oral Daily    hydrALAZINE  50 mg Oral Q8H    isosorbide dinitrate  10 mg Oral Q8H    magnesium oxide  800 mg Oral BID    magnesium sulfate IVPB  1 g Intravenous Once    melatonin  6 mg Oral Nightly    polyethylene glycol  17 g Oral Daily    potassium chloride  40 mEq Oral BID    senna-docusate 8.6-50 mg  2 tablet Oral BID    triamcinolone acetonide 0.5%   Topical (Top) TID     PRN Meds:albuterol, bisacodyl, Dextrose 10% Bolus, Dextrose 10% Bolus, glucagon (human recombinant), glucose, glucose, heparin (PORCINE), heparin (PORCINE), hydrOXYzine HCl, insulin aspart U-100, sodium chloride 0.9%, traMADol    Review of patient's allergies indicates:   Allergen Reactions    Iodine and iodide containing products      Objective:     Vital Signs (Most Recent):  Temp: 98.2 °F (36.8 °C) (01/25/20 0701)  Pulse: 108 (01/25/20 0701)  Resp: 20 (01/25/20 0701)  BP: 104/62 (01/25/20 0701)  SpO2: (!) 94 % (01/25/20 0701) Vital Signs (24h Range):  Temp:  [97.8 °F (36.6 °C)-98.2 °F (36.8 °C)] 98.2 °F (36.8 °C)  Pulse:  [103-119] 108  Resp:  [18-40] 20  SpO2:   [94 %-100 %] 94 %  BP: ()/(51-80) 104/62     Patient Vitals for the past 72 hrs (Last 3 readings):   Weight   01/25/20 0300 88.1 kg (194 lb 3.6 oz)   01/24/20 0300 88.5 kg (195 lb 1.7 oz)   01/23/20 1200 88.5 kg (195 lb)     Body mass index is 30.42 kg/m².      Intake/Output Summary (Last 24 hours) at 1/25/2020 0830  Last data filed at 1/25/2020 0800  Gross per 24 hour   Intake 2628.07 ml   Output 3500 ml   Net -871.93 ml       Hemodynamic Parameters:       Telemetry: ST    Physical Exam  Constitutional: He is oriented to person, place, and time. He appears well-developed and well-nourished.   HENT:   Head: Normocephalic.   Eyes: Pupils are equal, round, and reactive to light. EOM are normal.   Neck: Normal range of motion. Neck supple.   Cardiovascular: Normal rate and regular rhythm.   Pulmonary/Chest: Effort normal and breath sounds normal.   Decreased BS at bases    Abdominal: Soft. Bowel sounds are normal.   Musculoskeletal: Normal range of motion.   Neurological: He is alert and oriented to person, place, and time.   Skin: Skin is warm.  Significant Labs:  CBC:  Recent Labs   Lab 01/23/20  0400 01/24/20  0330 01/25/20  0431   WBC 10.30 11.27 10.50   RBC 4.40* 4.36* 4.55*   HGB 12.1* 11.8* 12.0*   HCT 36.3* 36.3* 38.0*    231 311   MCV 83 83 84   MCH 27.5 27.1 26.4*   MCHC 33.3 32.5 31.6*     BNP:  Recent Labs   Lab 01/21/20  0755 01/22/20  1752   BNP 3,053* 2,342*     CMP:  Recent Labs   Lab 01/23/20  0400 01/23/20  1514 01/24/20  0330  01/24/20  1625 01/24/20  2334 01/25/20  0431   *  281* 213* 258*  --  242*  --  137*   CALCIUM 9.8  9.8 9.5 9.4  --  9.5  --  10.0   ALBUMIN 3.5 3.3*  --   --  3.5  --   --    PROT 7.8 7.7  --   --  7.8  --   --    *  130* 131* 129*   < > 128*  128* 129* 135*  135*  135*   K 3.8  3.8 3.9 4.3  --  4.3  --  4.7  4.7   CO2 37*  37* 39* 38*  --  36*  --  36*   CL 76*  76* 79* 79*  --  82*  --  85*   BUN 22*  22* 25* 29*  --  32*  --  34*    CREATININE 1.8*  1.8* 1.8* 1.7*  --  2.0*  --  2.0*   ALKPHOS 107 106  --   --  104  --   --    ALT 17 18  --   --  22  --   --    AST 28 31  --   --  31  --   --    BILITOT 1.4* 1.1*  --   --  1.1*  --   --     < > = values in this interval not displayed.      Coagulation:   Recent Labs   Lab 01/23/20  0400 01/24/20  0330 01/25/20  0431   INR 1.4*  --   --    APTT 47.2* 49.8* 38.1*     LDH:  Recent Labs   Lab 01/22/20  1752   *     Microbiology:  Microbiology Results (last 7 days)     ** No results found for the last 168 hours. **          I have reviewed all pertinent labs within the past 24 hours.    Estimated Creatinine Clearance: 44.2 mL/min (A) (based on SCr of 2 mg/dL (H)).    Diagnostic Results:  I have reviewed and interpreted all pertinent imaging results/findings within the past 24 hours.    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 Chronic MARC - Class II-III and mild LE edema.      Hospital Course (synopsis of major diagnoses, care, treatment, and services provided during the course of the hospital stay):  -2/12 admit to CDU for diuresis with IV lasix  -IV abx   -CXR with suspected small left pleural effusion with associated left basilar atelectasis versus evolving airspace disease  -2/13 single chamber ICD implant; IV lasix decreased to BID  -2/16 add dobutamine, hold BB due to hypotension, no ACE-I or ARB due to recent HPI hypotension  -2/17 Lasix changed to PO  -2/18 dobutamine discontinued    Admitted to [facility] on Thursday due to severe SOB for a week with associated orthopnea, MARC, and PND unable to lie flat and found to be in acute diastolic heart failure. States he normally weighs around 219 but up to 254lb on admission tonight. Says he hasn't been the most compliant in terms of  fluid restriction and daily weights but has avoided salt. BNP on admission was 2375. BUN/CRT 32/1.4 He was started on IV diuretics but continued to deteriorate. Creatinine continued to increase and reached 4.3 with oliguria. He was started on CRRT for a few days and tolerated well. Renal u/s was negative for obstruction and liver u/s without findings of cirrhosis. All of this was progression of cardiorenal syndrome with liver congestion from severe volume overload. On arrival vitals stable and in no acute distress. He has obvious anasarca up to the chest. He did have uop of 500 at time of arrival also.    TTE 5/28/19  · Moderate left ventricular enlargement.  · Severely decreased left ventricular systolic function. The estimated ejection fraction is 20%  · Global hypokinetic wall motion.  · Grade III (severe) left ventricular diastolic dysfunction consistent with restrictive physiology.  · Severe left atrial enlargement.  · Moderate right ventricular enlargement.  · Low normal right ventricular systolic function.  · Mild right atrial enlargement.  · Moderate mitral regurgitation.  Mild to moderate tricuspid regurgitation    * Cardiogenic shock  -NICM; Likely Etoh induced  -Last 2D Echo 1/16/20: LVEF 20%, LVEDD 6.92 cm   -Transferred from [facility] with IABP at 1:1 for further level of care  - CVP 12, SVO2 68 , CO/CI 8.2/4.02,.87  while on Dobutamine @5, Radha 10,lasix at 30 mg/hr     - On step 3 pathway for VAD/OHT. Plan for RHC on Monday       Pruritus  -triamcinolone cream, atarax, Zyrtec and Famotidine   -improving    ANJELICA (acute kidney injury)  Cont to monitor with diuresis     Essential hypertension  -Currently essentially normotensive   -Continue Hyd/Isordil    Deep vein thrombosis (DVT) of right lower extremity  -Unsure of timing but was on eliquis PTA.   -Continue on heparin gtt for now      AICD (automatic cardioverter/defibrillator) present  -Medtronic placed 2019 for primary  prevention        Asad Westbrook MD  Heart Transplant  [facility]

## 2020-01-25 NOTE — SUBJECTIVE & OBJECTIVE
Interval History: CVP went up to 20 last night for which he recived a dose of Diuril. CVP at 4 AM is 17 for which Radha was increased to 10 and lasix drip increased to 30. CVP this morning is 12     Continuous Infusions:   DOBUTamine 5 mcg/kg/min (01/25/20 0800)    furosemide (LASIX) 10 mg/mL infusion (non-titrating) 30 mg/hr (01/25/20 0814)    heparin (porcine) in D5W 18 Units/kg/hr (01/25/20 0800)    nitric oxide gas       Scheduled Meds:   acetaminophen  1,000 mg Oral Q8H    atorvastatin  40 mg Oral Daily    cetirizine  10 mg Oral Daily    famotidine  40 mg Oral Daily    hydrALAZINE  50 mg Oral Q8H    isosorbide dinitrate  10 mg Oral Q8H    magnesium oxide  800 mg Oral BID    magnesium sulfate IVPB  1 g Intravenous Once    melatonin  6 mg Oral Nightly    polyethylene glycol  17 g Oral Daily    potassium chloride  40 mEq Oral BID    senna-docusate 8.6-50 mg  2 tablet Oral BID    triamcinolone acetonide 0.5%   Topical (Top) TID     PRN Meds:albuterol, bisacodyl, Dextrose 10% Bolus, Dextrose 10% Bolus, glucagon (human recombinant), glucose, glucose, heparin (PORCINE), heparin (PORCINE), hydrOXYzine HCl, insulin aspart U-100, sodium chloride 0.9%, traMADol    Review of patient's allergies indicates:   Allergen Reactions    Iodine and iodide containing products      Objective:     Vital Signs (Most Recent):  Temp: 98.2 °F (36.8 °C) (01/25/20 0701)  Pulse: 108 (01/25/20 0701)  Resp: 20 (01/25/20 0701)  BP: 104/62 (01/25/20 0701)  SpO2: (!) 94 % (01/25/20 0701) Vital Signs (24h Range):  Temp:  [97.8 °F (36.6 °C)-98.2 °F (36.8 °C)] 98.2 °F (36.8 °C)  Pulse:  [103-119] 108  Resp:  [18-40] 20  SpO2:  [94 %-100 %] 94 %  BP: ()/(51-80) 104/62     Patient Vitals for the past 72 hrs (Last 3 readings):   Weight   01/25/20 0300 88.1 kg (194 lb 3.6 oz)   01/24/20 0300 88.5 kg (195 lb 1.7 oz)   01/23/20 1200 88.5 kg (195 lb)     Body mass index is 30.42 kg/m².      Intake/Output Summary (Last 24 hours) at  1/25/2020 0830  Last data filed at 1/25/2020 0800  Gross per 24 hour   Intake 2628.07 ml   Output 3500 ml   Net -871.93 ml       Hemodynamic Parameters:       Telemetry: ST    Physical Exam  Constitutional: He is oriented to person, place, and time. He appears well-developed and well-nourished.   HENT:   Head: Normocephalic.   Eyes: Pupils are equal, round, and reactive to light. EOM are normal.   Neck: Normal range of motion. Neck supple.   Cardiovascular: Normal rate and regular rhythm.   Pulmonary/Chest: Effort normal and breath sounds normal.   Decreased BS at bases    Abdominal: Soft. Bowel sounds are normal.   Musculoskeletal: Normal range of motion.   Neurological: He is alert and oriented to person, place, and time.   Skin: Skin is warm.  Significant Labs:  CBC:  Recent Labs   Lab 01/23/20  0400 01/24/20  0330 01/25/20  0431   WBC 10.30 11.27 10.50   RBC 4.40* 4.36* 4.55*   HGB 12.1* 11.8* 12.0*   HCT 36.3* 36.3* 38.0*    231 311   MCV 83 83 84   MCH 27.5 27.1 26.4*   MCHC 33.3 32.5 31.6*     BNP:  Recent Labs   Lab 01/21/20  0755 01/22/20  1752   BNP 3,053* 2,342*     CMP:  Recent Labs   Lab 01/23/20  0400 01/23/20  1514 01/24/20  0330  01/24/20  1625 01/24/20  2334 01/25/20  0431   *  281* 213* 258*  --  242*  --  137*   CALCIUM 9.8  9.8 9.5 9.4  --  9.5  --  10.0   ALBUMIN 3.5 3.3*  --   --  3.5  --   --    PROT 7.8 7.7  --   --  7.8  --   --    *  130* 131* 129*   < > 128*  128* 129* 135*  135*  135*   K 3.8  3.8 3.9 4.3  --  4.3  --  4.7  4.7   CO2 37*  37* 39* 38*  --  36*  --  36*   CL 76*  76* 79* 79*  --  82*  --  85*   BUN 22*  22* 25* 29*  --  32*  --  34*   CREATININE 1.8*  1.8* 1.8* 1.7*  --  2.0*  --  2.0*   ALKPHOS 107 106  --   --  104  --   --    ALT 17 18  --   --  22  --   --    AST 28 31  --   --  31  --   --    BILITOT 1.4* 1.1*  --   --  1.1*  --   --     < > = values in this interval not displayed.      Coagulation:   Recent Labs   Lab 01/23/20  0143  01/24/20  0330 01/25/20  0431   INR 1.4*  --   --    APTT 47.2* 49.8* 38.1*     LDH:  Recent Labs   Lab 01/22/20  1752   *     Microbiology:  Microbiology Results (last 7 days)     ** No results found for the last 168 hours. **          I have reviewed all pertinent labs within the past 24 hours.    Estimated Creatinine Clearance: 44.2 mL/min (A) (based on SCr of 2 mg/dL (H)).    Diagnostic Results:  I have reviewed and interpreted all pertinent imaging results/findings within the past 24 hours.

## 2020-01-25 NOTE — PLAN OF CARE
CMICU DAILY GOALS   CVPs 12, 20, 19, 21 from [facility] PICC.  IV push Lasix x 1 and IV diuril given.  Lasix continuous gtt rate increased.  Pending next SvO2 at 2000.    A: Awake    RASS: Goal - RASS Goal: 0-->alert and calm  Actual - RASS ([place] Agitation-Sedation Scale): 0-->alert and calm   Restraint necessity: Clinical Justification: Removing medical devices  B: Breath   SBT: NA   C: Coordinate A & B, analgesics/sedatives   Pain: managed    SAT: NA  D: Delirium   CAM-ICU: Overall CAM-ICU: Negative  E: Early Mobility   MOVE Screen: Pass   Activity: Activity Management: activity adjusted per tolerance  FAS: Feeding/Nutrition   Diet order: Diet/Nutrition Received: other (see comments)(FR),   Fluid restriction: Fluid Requirement: 1500 mL  T: Thrombus   DVT prophylaxis: VTE Required Core Measure: Pharmacological prophylaxis initiated/maintained  H: HOB Elevation   Head of Bed (HOB): HOB at 30 degrees  U: Ulcer Prophylaxis   GI: yes  G: Glucose control   managed    S: Skin   Bundle compliance: yes   Bathing/Skin Care: bath, chlorhexidine, bath, complete, dressed/undressed, linen changed Date: Today, 1/25/20 AM shift  B: Bowel Function   no issues   I: Indwelling Catheters   Mc necessity:      Urethral Catheter 01/15/20 2030 Straight-tip 16 Fr.-Reason for Continuing Urinary Catheterization: Critically ill in ICU requiring intensive monitoring   CVC necessity: Yes   IPAD offered: Yes  D: De-escalation Antibx   No  Plan for the day   RHC to be completed on Monday  Family/Goals of care/Code Status   Code Status: Full Code     No acute events throughout day, VS and assessment per flow sheet, patient progressing towards goals as tolerated, plan of care reviewed with Saba Lott.

## 2020-01-26 LAB
ALBUMIN SERPL BCP-MCNC: 3.9 G/DL (ref 3.5–5.2)
ALLENS TEST: ABNORMAL
ALP SERPL-CCNC: 99 U/L (ref 55–135)
ALT SERPL W/O P-5'-P-CCNC: 27 U/L (ref 10–44)
ANION GAP SERPL CALC-SCNC: 11 MMOL/L (ref 8–16)
ANION GAP SERPL CALC-SCNC: 14 MMOL/L (ref 8–16)
ANION GAP SERPL CALC-SCNC: 15 MMOL/L (ref 8–16)
APTT BLDCRRT: 36.4 SEC (ref 21–32)
APTT BLDCRRT: 36.8 SEC (ref 21–32)
APTT BLDCRRT: 72.8 SEC (ref 21–32)
APTT BLDCRRT: >150 SEC (ref 21–32)
APTT BLDCRRT: >150 SEC (ref 21–32)
AST SERPL-CCNC: 38 U/L (ref 10–40)
BASOPHILS # BLD AUTO: 0.06 K/UL (ref 0–0.2)
BASOPHILS NFR BLD: 0.6 % (ref 0–1.9)
BILIRUB SERPL-MCNC: 1.2 MG/DL (ref 0.1–1)
BUN SERPL-MCNC: 36 MG/DL (ref 6–20)
BUN SERPL-MCNC: 40 MG/DL (ref 6–20)
BUN SERPL-MCNC: 41 MG/DL (ref 6–20)
CALCIUM SERPL-MCNC: 10 MG/DL (ref 8.7–10.5)
CALCIUM SERPL-MCNC: 10.2 MG/DL (ref 8.7–10.5)
CALCIUM SERPL-MCNC: 9.1 MG/DL (ref 8.7–10.5)
CHLORIDE SERPL-SCNC: 84 MMOL/L (ref 95–110)
CHLORIDE SERPL-SCNC: 84 MMOL/L (ref 95–110)
CHLORIDE SERPL-SCNC: 86 MMOL/L (ref 95–110)
CO2 SERPL-SCNC: 32 MMOL/L (ref 23–29)
CO2 SERPL-SCNC: 33 MMOL/L (ref 23–29)
CO2 SERPL-SCNC: 33 MMOL/L (ref 23–29)
CREAT SERPL-MCNC: 1.9 MG/DL (ref 0.5–1.4)
CREAT SERPL-MCNC: 2.2 MG/DL (ref 0.5–1.4)
CREAT SERPL-MCNC: 2.2 MG/DL (ref 0.5–1.4)
DELSYS: ABNORMAL
DELSYS: ABNORMAL
DIFFERENTIAL METHOD: ABNORMAL
EOSINOPHIL # BLD AUTO: 0.1 K/UL (ref 0–0.5)
EOSINOPHIL NFR BLD: 1.3 % (ref 0–8)
ERYTHROCYTE [DISTWIDTH] IN BLOOD BY AUTOMATED COUNT: 17.5 % (ref 11.5–14.5)
EST. GFR  (AFRICAN AMERICAN): 37.6 ML/MIN/1.73 M^2
EST. GFR  (AFRICAN AMERICAN): 37.6 ML/MIN/1.73 M^2
EST. GFR  (AFRICAN AMERICAN): 44.9 ML/MIN/1.73 M^2
EST. GFR  (NON AFRICAN AMERICAN): 32.5 ML/MIN/1.73 M^2
EST. GFR  (NON AFRICAN AMERICAN): 32.5 ML/MIN/1.73 M^2
EST. GFR  (NON AFRICAN AMERICAN): 38.8 ML/MIN/1.73 M^2
FLOW: 4
GLUCOSE SERPL-MCNC: 122 MG/DL (ref 70–110)
GLUCOSE SERPL-MCNC: 197 MG/DL (ref 70–110)
GLUCOSE SERPL-MCNC: 321 MG/DL (ref 70–110)
HCO3 UR-SCNC: 37.8 MMOL/L (ref 24–28)
HCO3 UR-SCNC: 41.9 MMOL/L (ref 24–28)
HCO3 UR-SCNC: 42.9 MMOL/L (ref 24–28)
HCT VFR BLD AUTO: 37.5 % (ref 40–54)
HGB BLD-MCNC: 11.9 G/DL (ref 14–18)
IMM GRANULOCYTES # BLD AUTO: 0.05 K/UL (ref 0–0.04)
IMM GRANULOCYTES NFR BLD AUTO: 0.5 % (ref 0–0.5)
LYMPHOCYTES # BLD AUTO: 1.5 K/UL (ref 1–4.8)
LYMPHOCYTES NFR BLD: 14.3 % (ref 18–48)
MAGNESIUM SERPL-MCNC: 2.2 MG/DL (ref 1.6–2.6)
MCH RBC QN AUTO: 26.6 PG (ref 27–31)
MCHC RBC AUTO-ENTMCNC: 31.7 G/DL (ref 32–36)
MCV RBC AUTO: 84 FL (ref 82–98)
METHEMOGLOBIN: 0.3 % (ref 0–3)
MODE: ABNORMAL
MONOCYTES # BLD AUTO: 0.8 K/UL (ref 0.3–1)
MONOCYTES NFR BLD: 7.5 % (ref 4–15)
NEUTROPHILS # BLD AUTO: 7.8 K/UL (ref 1.8–7.7)
NEUTROPHILS NFR BLD: 75.8 % (ref 38–73)
NRBC BLD-RTO: 0 /100 WBC
PCO2 BLDA: 54.3 MMHG (ref 35–45)
PCO2 BLDA: 56.1 MMHG (ref 35–45)
PCO2 BLDA: 57.3 MMHG (ref 35–45)
PH SMN: 7.45 [PH] (ref 7.35–7.45)
PH SMN: 7.47 [PH] (ref 7.35–7.45)
PH SMN: 7.49 [PH] (ref 7.35–7.45)
PHOSPHATE SERPL-MCNC: 5.6 MG/DL (ref 2.7–4.5)
PLATELET # BLD AUTO: 344 K/UL (ref 150–350)
PMV BLD AUTO: 9.8 FL (ref 9.2–12.9)
PO2 BLDA: 26 MMHG (ref 40–60)
PO2 BLDA: 36 MMHG (ref 40–60)
PO2 BLDA: 36 MMHG (ref 40–60)
POC BE: 14 MMOL/L
POC BE: 18 MMOL/L
POC BE: 20 MMOL/L
POC SATURATED O2: 49 % (ref 95–100)
POC SATURATED O2: 70 % (ref 95–100)
POC SATURATED O2: 72 % (ref 95–100)
POC TCO2: 39 MMOL/L (ref 24–29)
POC TCO2: 44 MMOL/L (ref 24–29)
POC TCO2: 45 MMOL/L (ref 24–29)
POCT GLUCOSE: 132 MG/DL (ref 70–110)
POCT GLUCOSE: 140 MG/DL (ref 70–110)
POCT GLUCOSE: 178 MG/DL (ref 70–110)
POCT GLUCOSE: 313 MG/DL (ref 70–110)
POTASSIUM SERPL-SCNC: 3.9 MMOL/L (ref 3.5–5.1)
POTASSIUM SERPL-SCNC: 4.5 MMOL/L (ref 3.5–5.1)
POTASSIUM SERPL-SCNC: 5.2 MMOL/L (ref 3.5–5.1)
PROT SERPL-MCNC: 8.6 G/DL (ref 6–8.4)
RBC # BLD AUTO: 4.48 M/UL (ref 4.6–6.2)
SAMPLE: ABNORMAL
SITE: ABNORMAL
SODIUM SERPL-SCNC: 128 MMOL/L (ref 136–145)
SODIUM SERPL-SCNC: 131 MMOL/L (ref 136–145)
SODIUM SERPL-SCNC: 133 MMOL/L (ref 136–145)
SP02: 100
WBC # BLD AUTO: 10.27 K/UL (ref 3.9–12.7)

## 2020-01-26 PROCEDURE — 63600175 PHARM REV CODE 636 W HCPCS: Mod: NTX | Performed by: STUDENT IN AN ORGANIZED HEALTH CARE EDUCATION/TRAINING PROGRAM

## 2020-01-26 PROCEDURE — 83735 ASSAY OF MAGNESIUM: CPT | Mod: NTX

## 2020-01-26 PROCEDURE — 63600175 PHARM REV CODE 636 W HCPCS: Mod: NTX | Performed by: HOSPITALIST

## 2020-01-26 PROCEDURE — 84100 ASSAY OF PHOSPHORUS: CPT | Mod: NTX

## 2020-01-26 PROCEDURE — 99900035 HC TECH TIME PER 15 MIN (STAT): Mod: NTX

## 2020-01-26 PROCEDURE — 27000221 HC OXYGEN, UP TO 24 HOURS: Mod: NTX

## 2020-01-26 PROCEDURE — 80053 COMPREHEN METABOLIC PANEL: CPT | Mod: NTX

## 2020-01-26 PROCEDURE — 25000003 PHARM REV CODE 250: Mod: NTX | Performed by: STUDENT IN AN ORGANIZED HEALTH CARE EDUCATION/TRAINING PROGRAM

## 2020-01-26 PROCEDURE — 63600367 HC NITRIC OXIDE PER HOUR: Mod: NTX

## 2020-01-26 PROCEDURE — 80048 BASIC METABOLIC PNL TOTAL CA: CPT | Mod: 91,NTX

## 2020-01-26 PROCEDURE — 27200188 HC TRANSDUCER, ART ADULT/PEDS

## 2020-01-26 PROCEDURE — 20000000 HC ICU ROOM: Mod: NTX

## 2020-01-26 PROCEDURE — 82803 BLOOD GASES ANY COMBINATION: CPT | Mod: NTX

## 2020-01-26 PROCEDURE — 99291 CRITICAL CARE FIRST HOUR: CPT | Mod: NTX,,, | Performed by: INTERNAL MEDICINE

## 2020-01-26 PROCEDURE — 25000003 PHARM REV CODE 250: Mod: NTX | Performed by: HOSPITALIST

## 2020-01-26 PROCEDURE — 25000003 PHARM REV CODE 250: Mod: NTX | Performed by: INTERNAL MEDICINE

## 2020-01-26 PROCEDURE — 99291 PR CRITICAL CARE, E/M 30-74 MINUTES: ICD-10-PCS | Mod: NTX,,, | Performed by: INTERNAL MEDICINE

## 2020-01-26 PROCEDURE — 94761 N-INVAS EAR/PLS OXIMETRY MLT: CPT | Mod: NTX

## 2020-01-26 PROCEDURE — 25000003 PHARM REV CODE 250: Mod: NTX | Performed by: NURSE PRACTITIONER

## 2020-01-26 PROCEDURE — 85730 THROMBOPLASTIN TIME PARTIAL: CPT | Mod: NTX

## 2020-01-26 PROCEDURE — 85025 COMPLETE CBC W/AUTO DIFF WBC: CPT | Mod: NTX

## 2020-01-26 PROCEDURE — 85730 THROMBOPLASTIN TIME PARTIAL: CPT | Mod: 91,NTX

## 2020-01-26 PROCEDURE — 83050 HGB METHEMOGLOBIN QUAN: CPT | Mod: NTX

## 2020-01-26 RX ADMIN — ISOSORBIDE DINITRATE 10 MG: 10 TABLET ORAL at 09:01

## 2020-01-26 RX ADMIN — Medication 800 MG: at 08:01

## 2020-01-26 RX ADMIN — Medication 800 MG: at 09:01

## 2020-01-26 RX ADMIN — TRIAMCINOLONE ACETONIDE: 5 CREAM TOPICAL at 09:01

## 2020-01-26 RX ADMIN — HYDRALAZINE HYDROCHLORIDE 50 MG: 50 TABLET ORAL at 06:01

## 2020-01-26 RX ADMIN — EPINEPHRINE 0.04 MCG/KG/MIN: 1 INJECTION PARENTERAL at 06:01

## 2020-01-26 RX ADMIN — TRIAMCINOLONE ACETONIDE: 5 CREAM TOPICAL at 02:01

## 2020-01-26 RX ADMIN — Medication 6 MG: at 09:01

## 2020-01-26 RX ADMIN — CETIRIZINE HYDROCHLORIDE 10 MG: 10 TABLET, FILM COATED ORAL at 08:01

## 2020-01-26 RX ADMIN — FUROSEMIDE 40 MG/HR: 10 INJECTION, SOLUTION INTRAMUSCULAR; INTRAVENOUS at 03:01

## 2020-01-26 RX ADMIN — ACETAMINOPHEN 1000 MG: 500 TABLET ORAL at 09:01

## 2020-01-26 RX ADMIN — HYDRALAZINE HYDROCHLORIDE 50 MG: 50 TABLET ORAL at 09:01

## 2020-01-26 RX ADMIN — POTASSIUM CHLORIDE 40 MEQ: 1500 TABLET, EXTENDED RELEASE ORAL at 09:01

## 2020-01-26 RX ADMIN — HYDRALAZINE HYDROCHLORIDE 50 MG: 50 TABLET ORAL at 02:01

## 2020-01-26 RX ADMIN — FAMOTIDINE 40 MG: 40 POWDER, FOR SUSPENSION ORAL at 08:01

## 2020-01-26 RX ADMIN — DOBUTAMINE IN DEXTROSE 5 MCG/KG/MIN: 200 INJECTION, SOLUTION INTRAVENOUS at 11:01

## 2020-01-26 RX ADMIN — CHLOROTHIAZIDE SODIUM 250 MG: 500 INJECTION, POWDER, LYOPHILIZED, FOR SOLUTION INTRAVENOUS at 04:01

## 2020-01-26 RX ADMIN — CHLOROTHIAZIDE SODIUM 250 MG: 500 INJECTION, POWDER, LYOPHILIZED, FOR SOLUTION INTRAVENOUS at 11:01

## 2020-01-26 RX ADMIN — HEPARIN SODIUM AND DEXTROSE 15 UNITS/KG/HR: 10000; 5 INJECTION INTRAVENOUS at 07:01

## 2020-01-26 RX ADMIN — CHLOROTHIAZIDE SODIUM 250 MG: 500 INJECTION, POWDER, LYOPHILIZED, FOR SOLUTION INTRAVENOUS at 08:01

## 2020-01-26 RX ADMIN — FUROSEMIDE 40 MG/HR: 10 INJECTION, SOLUTION INTRAMUSCULAR; INTRAVENOUS at 12:01

## 2020-01-26 RX ADMIN — ATORVASTATIN CALCIUM 40 MG: 20 TABLET, FILM COATED ORAL at 08:01

## 2020-01-26 RX ADMIN — ACETAMINOPHEN 1000 MG: 500 TABLET ORAL at 02:01

## 2020-01-26 RX ADMIN — TRIAMCINOLONE ACETONIDE: 5 CREAM TOPICAL at 08:01

## 2020-01-26 RX ADMIN — POTASSIUM CHLORIDE 40 MEQ: 1500 TABLET, EXTENDED RELEASE ORAL at 08:01

## 2020-01-26 RX ADMIN — HEPARIN SODIUM AND DEXTROSE 17 UNITS/KG/HR: 10000; 5 INJECTION INTRAVENOUS at 12:01

## 2020-01-26 RX ADMIN — ISOSORBIDE DINITRATE 10 MG: 10 TABLET ORAL at 06:01

## 2020-01-26 RX ADMIN — INSULIN ASPART 2 UNITS: 100 INJECTION, SOLUTION INTRAVENOUS; SUBCUTANEOUS at 09:01

## 2020-01-26 NOTE — CARE UPDATE
BG goal 140 -180     BG is within goal on current PRN SQ insulin regimen.   Diet diabetic [facility]; 2000 Calorie; Fluid - 1000mL    Continue:    Low Dose SQ Insulin Correction Scale PRN BG excursions.    BG Monitoring AC/HS     ** Please call Endocrine for any BG related issues **  ** Please notify Endocrine for any change and/or advance in diet**    Discharge Planning:    TBD. Please notify endocrinology prior to discharge.

## 2020-01-26 NOTE — ASSESSMENT & PLAN NOTE
-NICM; Likely Etoh induced  -Transferred from [facility] with IABP at 1:1 for further level of care. IABP removed 1/25  -Last 2D Echo 1/23/20: LVEF 20%, LVEDD 6.46 cm, RV mod dilated and mild to mod depressed, IVC 15  -RHC this morning on  5, Epi 0.04, Radha 20 ppm and Lasix 40 mg/hr: RA 12 (not correlating with CVP of 20 by PICC this morning), PAP 38/22 (30), W 20 and CO/CI 4.11/2.06. Will continue same regimen for now  -GDMT: Continue Hyd/Isordil       - Advanced to step 4 pathway for VAD/OHT. Speech cleared him from  cognitive assessment stand point but nurses have concern abt his cognition        -Plan to present at [facility] this Wednesday.

## 2020-01-26 NOTE — PLAN OF CARE
HD at 4 PM     CVP 20  Svo2 49  CO/CI 3.6/1.75,SVR 1133    Diuril 250 mg given. Increase Radha to 20 PPM, Add Epinephrine 0.04.     Will check HD at 9 PM

## 2020-01-26 NOTE — PLAN OF CARE
CO 3.8 / CI 1.85 / SVR 1350 / CVP 18 (prev 17) / SVO2 52 (prev 68)   cc/hr    Plan  - Diuril 250 mg IV x 1    Discussed with [facility] staff    Skyler Reagan MD  Cardiology  PGY-4  Pager: [phone]

## 2020-01-26 NOTE — SUBJECTIVE & OBJECTIVE
Interval History: No events overnight.  CVP 16 this morning     Continuous Infusions:   DOBUTamine 5 mcg/kg/min (01/26/20 0800)    furosemide (LASIX) 10 mg/mL infusion (non-titrating) 40 mg/hr (01/26/20 0800)    heparin (porcine) in D5W 15 Units/kg/hr (01/26/20 0800)    nitric oxide gas       Scheduled Meds:   acetaminophen  1,000 mg Oral Q8H    atorvastatin  40 mg Oral Daily    cetirizine  10 mg Oral Daily    chlorothiazide (DIURIL) IVPB  250 mg Intravenous Once    famotidine  40 mg Oral Daily    hydrALAZINE  50 mg Oral Q8H    isosorbide dinitrate  10 mg Oral Q8H    magnesium oxide  800 mg Oral BID    magnesium sulfate IVPB  1 g Intravenous Once    melatonin  6 mg Oral Nightly    polyethylene glycol  17 g Oral Daily    potassium chloride  40 mEq Oral BID    senna-docusate 8.6-50 mg  2 tablet Oral BID    triamcinolone acetonide 0.5%   Topical (Top) TID     PRN Meds:albuterol, bisacodyl, Dextrose 10% Bolus, Dextrose 10% Bolus, glucagon (human recombinant), glucose, glucose, heparin (PORCINE), heparin (PORCINE), hydrOXYzine HCl, insulin aspart U-100, sodium chloride 0.9%, traMADol    Review of patient's allergies indicates:   Allergen Reactions    Iodine and iodide containing products      Objective:     Vital Signs (Most Recent):  Temp: 96.1 °F (35.6 °C) (01/26/20 0701)  Pulse: 109 (01/26/20 0800)  Resp: 20 (01/26/20 0800)  BP: 103/75 (01/26/20 0800)  SpO2: 100 % (01/26/20 0800) Vital Signs (24h Range):  Temp:  [96.1 °F (35.6 °C)-98.4 °F (36.9 °C)] 96.1 °F (35.6 °C)  Pulse:  [] 109  Resp:  [15-37] 20  SpO2:  [95 %-100 %] 100 %  BP: ()/(50-86) 103/75     Patient Vitals for the past 72 hrs (Last 3 readings):   Weight   01/25/20 0300 88.1 kg (194 lb 3.6 oz)   01/24/20 0300 88.5 kg (195 lb 1.7 oz)   01/23/20 1200 88.5 kg (195 lb)     Body mass index is 30.42 kg/m².      Intake/Output Summary (Last 24 hours) at 1/26/2020 0856  Last data filed at 1/26/2020 0844  Gross per 24 hour   Intake  2544.15 ml   Output 3520 ml   Net -975.85 ml       Hemodynamic Parameters:       Telemetry: NSR    Physical Exam  Constitutional: He is oriented to person, place, and time. He appears well-developed and well-nourished.   HENT:   Head: Normocephalic.   Eyes: Pupils are equal, round, and reactive to light. EOM are normal.   Neck: Normal range of motion. Neck supple.   Cardiovascular: Normal rate and regular rhythm.   Pulmonary/Chest: Effort normal and breath sounds normal.   Decreased BS at bases    Abdominal: Soft. Bowel sounds are normal.   Musculoskeletal: Normal range of motion.   Neurological: He is alert and oriented to person, place, and time.   Skin: Skin is warm.  Significant Labs:  CBC:  Recent Labs   Lab 01/24/20  0330 01/25/20  0431 01/26/20  0352   WBC 11.27 10.50 10.27   RBC 4.36* 4.55* 4.48*   HGB 11.8* 12.0* 11.9*   HCT 36.3* 38.0* 37.5*    311 344   MCV 83 84 84   MCH 27.1 26.4* 26.6*   MCHC 32.5 31.6* 31.7*     BNP:  Recent Labs   Lab 01/21/20  0755 01/22/20  1752   BNP 3,053* 2,342*     CMP:  Recent Labs   Lab 01/23/20  1514  01/24/20  1625  01/25/20  1132 01/25/20 2021 01/26/20  0352   *   < > 242*   < > 241* 242* 122*   CALCIUM 9.5   < > 9.5   < > 10.0 9.7 10.2   ALBUMIN 3.3*  --  3.5  --   --   --  3.9   PROT 7.7  --  7.8  --   --   --  8.6*   *   < > 128*  128*   < > 131* 130* 133*   K 3.9   < > 4.3   < > 4.3 4.5 4.5   CO2 39*   < > 36*   < > 36* 34* 32*   CL 79*   < > 82*   < > 83* 83* 86*   BUN 25*   < > 32*   < > 36* 37* 36*   CREATININE 1.8*   < > 2.0*   < > 2.1* 2.2* 1.9*   ALKPHOS 106  --  104  --   --   --  99   ALT 18  --  22  --   --   --  27   AST 31  --  31  --   --   --  38   BILITOT 1.1*  --  1.1*  --   --   --  1.2*    < > = values in this interval not displayed.      Coagulation:   Recent Labs   Lab 01/23/20  0400  01/25/20  1132 01/25/20 2021 01/26/20  0352   INR 1.4*  --   --   --   --    APTT 47.2*   < > 79.4* 51.3* 36.8*    < > = values in this interval  not displayed.     LDH:  No results for input(s): LDH in the last 72 hours.  Microbiology:  Microbiology Results (last 7 days)     ** No results found for the last 168 hours. **          I have reviewed all pertinent labs within the past 24 hours.    Estimated Creatinine Clearance: 46.5 mL/min (A) (based on SCr of 1.9 mg/dL (H)).    Diagnostic Results:  I have reviewed and interpreted all pertinent imaging results/findings within the past 24 hours.

## 2020-01-26 NOTE — PROGRESS NOTES
[facility]  Heart Transplant  Progress Note    Patient Name: Saba Lott  MRN: [id number]  Admission Date: 1/15/2020  Hospital Length of Stay: 11 days  Attending Physician: Lian Daniel MD  Primary Care Provider: Primary Doctor No  Principal Problem:Cardiogenic shock    Subjective:     Interval History: No events overnight.  CVP 16 this morning. Received 2 doses of diuril yesterday     Continuous Infusions:   DOBUTamine 5 mcg/kg/min (01/26/20 0800)    furosemide (LASIX) 10 mg/mL infusion (non-titrating) 40 mg/hr (01/26/20 0800)    heparin (porcine) in D5W 15 Units/kg/hr (01/26/20 0800)    nitric oxide gas       Scheduled Meds:   acetaminophen  1,000 mg Oral Q8H    atorvastatin  40 mg Oral Daily    cetirizine  10 mg Oral Daily    chlorothiazide (DIURIL) IVPB  250 mg Intravenous Once    famotidine  40 mg Oral Daily    hydrALAZINE  50 mg Oral Q8H    isosorbide dinitrate  10 mg Oral Q8H    magnesium oxide  800 mg Oral BID    magnesium sulfate IVPB  1 g Intravenous Once    melatonin  6 mg Oral Nightly    polyethylene glycol  17 g Oral Daily    potassium chloride  40 mEq Oral BID    senna-docusate 8.6-50 mg  2 tablet Oral BID    triamcinolone acetonide 0.5%   Topical (Top) TID     PRN Meds:albuterol, bisacodyl, Dextrose 10% Bolus, Dextrose 10% Bolus, glucagon (human recombinant), glucose, glucose, heparin (PORCINE), heparin (PORCINE), hydrOXYzine HCl, insulin aspart U-100, sodium chloride 0.9%, traMADol    Review of patient's allergies indicates:   Allergen Reactions    Iodine and iodide containing products      Objective:     Vital Signs (Most Recent):  Temp: 96.1 °F (35.6 °C) (01/26/20 0701)  Pulse: 109 (01/26/20 0800)  Resp: 20 (01/26/20 0800)  BP: 103/75 (01/26/20 0800)  SpO2: 100 % (01/26/20 0800) Vital Signs (24h Range):  Temp:  [96.1 °F (35.6 °C)-98.4 °F (36.9 °C)] 96.1 °F (35.6 °C)  Pulse:  [] 109  Resp:  [15-37] 20  SpO2:  [95 %-100 %] 100 %  BP: ()/(50-86)  103/75     Patient Vitals for the past 72 hrs (Last 3 readings):   Weight   01/25/20 0300 88.1 kg (194 lb 3.6 oz)   01/24/20 0300 88.5 kg (195 lb 1.7 oz)   01/23/20 1200 88.5 kg (195 lb)     Body mass index is 30.42 kg/m².      Intake/Output Summary (Last 24 hours) at 1/26/2020 0856  Last data filed at 1/26/2020 0844  Gross per 24 hour   Intake 2544.15 ml   Output 3520 ml   Net -975.85 ml       Hemodynamic Parameters:       Telemetry: NSR    Physical Exam  Constitutional: He is oriented to person, place, and time. He appears well-developed and well-nourished.   HENT:   Head: Normocephalic.   Eyes: Pupils are equal, round, and reactive to light. EOM are normal.   Neck: Normal range of motion. Neck supple.   Cardiovascular: Normal rate and regular rhythm.   Pulmonary/Chest: Effort normal and breath sounds normal.   Decreased BS at bases    Abdominal: Soft. Bowel sounds are normal.   Musculoskeletal: Normal range of motion.   Neurological: He is alert and oriented to person, place, and time.   Skin: Skin is warm.  Significant Labs:  CBC:  Recent Labs   Lab 01/24/20  0330 01/25/20  0431 01/26/20  0352   WBC 11.27 10.50 10.27   RBC 4.36* 4.55* 4.48*   HGB 11.8* 12.0* 11.9*   HCT 36.3* 38.0* 37.5*    311 344   MCV 83 84 84   MCH 27.1 26.4* 26.6*   MCHC 32.5 31.6* 31.7*     BNP:  Recent Labs   Lab 01/21/20  0755 01/22/20  1752   BNP 3,053* 2,342*     CMP:  Recent Labs   Lab 01/23/20  1514  01/24/20  1625  01/25/20  1132 01/25/20 2021 01/26/20  0352   *   < > 242*   < > 241* 242* 122*   CALCIUM 9.5   < > 9.5   < > 10.0 9.7 10.2   ALBUMIN 3.3*  --  3.5  --   --   --  3.9   PROT 7.7  --  7.8  --   --   --  8.6*   *   < > 128*  128*   < > 131* 130* 133*   K 3.9   < > 4.3   < > 4.3 4.5 4.5   CO2 39*   < > 36*   < > 36* 34* 32*   CL 79*   < > 82*   < > 83* 83* 86*   BUN 25*   < > 32*   < > 36* 37* 36*   CREATININE 1.8*   < > 2.0*   < > 2.1* 2.2* 1.9*   ALKPHOS 106  --  104  --   --   --  99   ALT 18  --  22   --   --   --  27   AST 31  --  31  --   --   --  38   BILITOT 1.1*  --  1.1*  --   --   --  1.2*    < > = values in this interval not displayed.      Coagulation:   Recent Labs   Lab 01/23/20  0400  01/25/20  1132 01/25/20 2021 01/26/20  0352   INR 1.4*  --   --   --   --    APTT 47.2*   < > 79.4* 51.3* 36.8*    < > = values in this interval not displayed.     LDH:  No results for input(s): LDH in the last 72 hours.  Microbiology:  Microbiology Results (last 7 days)     ** No results found for the last 168 hours. **          I have reviewed all pertinent labs within the past 24 hours.    Estimated Creatinine Clearance: 46.5 mL/min (A) (based on SCr of 1.9 mg/dL (H)).    Diagnostic Results:  I have reviewed and interpreted all pertinent imaging results/findings within the past 24 hours.    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 Chronic MARC - Class II-III and mild LE edema.      Hospital Course (synopsis of major diagnoses, care, treatment, and services provided during the course of the hospital stay):  -2/12 admit to CDU for diuresis with IV lasix  -IV abx   -CXR with suspected small left pleural effusion with associated left basilar atelectasis versus evolving airspace disease  -2/13 single chamber ICD implant; IV lasix decreased to BID  -2/16 add dobutamine, hold BB due to hypotension, no ACE-I or ARB due to recent HPI hypotension  -2/17 Lasix changed to PO  -2/18 dobutamine discontinued    Admitted to [facility] on Thursday due to severe SOB for a week with associated orthopnea, MARC, and PND unable to lie flat and found to be in acute diastolic heart failure. States he normally weighs around 219 but up to 254lb on admission tonight. Says he hasn't been the most compliant in terms of fluid restriction and  daily weights but has avoided salt. BNP on admission was 2375. BUN/CRT 32/1.4 He was started on IV diuretics but continued to deteriorate. Creatinine continued to increase and reached 4.3 with oliguria. He was started on CRRT for a few days and tolerated well. Renal u/s was negative for obstruction and liver u/s without findings of cirrhosis. All of this was progression of cardiorenal syndrome with liver congestion from severe volume overload. On arrival vitals stable and in no acute distress. He has obvious anasarca up to the chest. He did have uop of 500 at time of arrival also.    TTE 5/28/19  · Moderate left ventricular enlargement.  · Severely decreased left ventricular systolic function. The estimated ejection fraction is 20%  · Global hypokinetic wall motion.  · Grade III (severe) left ventricular diastolic dysfunction consistent with restrictive physiology.  · Severe left atrial enlargement.  · Moderate right ventricular enlargement.  · Low normal right ventricular systolic function.  · Mild right atrial enlargement.  · Moderate mitral regurgitation.  Mild to moderate tricuspid regurgitation    * Cardiogenic shock  -NICM; Likely Etoh induced  -Last 2D Echo 1/16/20: LVEF 20%, LVEDD 6.92 cm   -[facility] with [facility]ABP at 1:1 for further level of care  - CVP 16 SVO2 72, CO/CI 7.2/3.1   while on Dobutamine @5, Radha 10  .   - On lasix 40 mg/hr,  Will give him dose of diuril today. Plan for RHC tomorrow   - On step 3 pathway for VAD/OHT        Pruritus  -triamcinolone cream, atarax, Zyrtec and Famotidine   -improving    ANJELICA (acute kidney injury)  CR peaked up to 2.2 . This morning trended down to 1.9 . Cont to monitor with diuresis     Essential hypertension  -Currently essentially normotensive   -Continue Hyd/Isordil    Deep vein thrombosis (DVT) of right lower extremity  -Unsure of timing but was on eliquis PTA.   -Continue on heparin gtt for now      AICD (automatic  cardioverter/defibrillator) present  -Medtronic placed 2019 for primary prevention        Asad Westbrook MD  Heart Transplant  [facility]

## 2020-01-27 PROBLEM — L29.9 PRURITUS: Status: RESOLVED | Noted: 2020-01-17 | Resolved: 2020-01-27

## 2020-01-27 PROBLEM — Z71.89 GOALS OF CARE, COUNSELING/DISCUSSION: Status: ACTIVE | Noted: 2020-01-27

## 2020-01-27 PROBLEM — Z51.5 PALLIATIVE CARE ENCOUNTER: Status: ACTIVE | Noted: 2020-01-27

## 2020-01-27 LAB
ABO + RH BLD: NORMAL
ALBUMIN SERPL BCP-MCNC: 3.9 G/DL (ref 3.5–5.2)
ALLENS TEST: ABNORMAL
ALP SERPL-CCNC: 97 U/L (ref 55–135)
ALT SERPL W/O P-5'-P-CCNC: 27 U/L (ref 10–44)
ANION GAP SERPL CALC-SCNC: 13 MMOL/L (ref 8–16)
ANION GAP SERPL CALC-SCNC: 14 MMOL/L (ref 8–16)
APTT BLDCRRT: 27.9 SEC (ref 21–32)
APTT BLDCRRT: 44.6 SEC (ref 21–32)
APTT BLDCRRT: 46.5 SEC (ref 21–32)
APTT BLDCRRT: 48.4 SEC (ref 21–32)
AST SERPL-CCNC: 31 U/L (ref 10–40)
BASOPHILS # BLD AUTO: 0.05 K/UL (ref 0–0.2)
BASOPHILS NFR BLD: 0.4 % (ref 0–1.9)
BILIRUB SERPL-MCNC: 1.3 MG/DL (ref 0.1–1)
BLD GP AB SCN CELLS X3 SERPL QL: NORMAL
BNP SERPL-MCNC: 3095 PG/ML (ref 0–99)
BUN SERPL-MCNC: 41 MG/DL (ref 6–20)
BUN SERPL-MCNC: 44 MG/DL (ref 6–20)
CALCIUM SERPL-MCNC: 9.4 MG/DL (ref 8.7–10.5)
CALCIUM SERPL-MCNC: 9.8 MG/DL (ref 8.7–10.5)
CHLORIDE SERPL-SCNC: 84 MMOL/L (ref 95–110)
CHLORIDE SERPL-SCNC: 84 MMOL/L (ref 95–110)
CO2 SERPL-SCNC: 30 MMOL/L (ref 23–29)
CO2 SERPL-SCNC: 32 MMOL/L (ref 23–29)
CREAT SERPL-MCNC: 2 MG/DL (ref 0.5–1.4)
CREAT SERPL-MCNC: 2.1 MG/DL (ref 0.5–1.4)
DELSYS: ABNORMAL
DIFFERENTIAL METHOD: ABNORMAL
EOSINOPHIL # BLD AUTO: 0.1 K/UL (ref 0–0.5)
EOSINOPHIL NFR BLD: 0.4 % (ref 0–8)
ERYTHROCYTE [DISTWIDTH] IN BLOOD BY AUTOMATED COUNT: 17.7 % (ref 11.5–14.5)
EST. GFR  (AFRICAN AMERICAN): 39.8 ML/MIN/1.73 M^2
EST. GFR  (AFRICAN AMERICAN): 42.2 ML/MIN/1.73 M^2
EST. GFR  (NON AFRICAN AMERICAN): 34.4 ML/MIN/1.73 M^2
EST. GFR  (NON AFRICAN AMERICAN): 36.5 ML/MIN/1.73 M^2
F2 GENE MUT ANL BLD/T: NORMAL
G6PD RBC-CCNT: 16.5 U/G HGB (ref 7–20.5)
GLUCOSE SERPL-MCNC: 244 MG/DL (ref 70–110)
GLUCOSE SERPL-MCNC: 245 MG/DL (ref 70–110)
HCO3 UR-SCNC: 38.5 MMOL/L (ref 24–28)
HCT VFR BLD AUTO: 36.2 % (ref 40–54)
HGB BLD-MCNC: 11.6 G/DL (ref 14–18)
IMM GRANULOCYTES # BLD AUTO: 0.06 K/UL (ref 0–0.04)
IMM GRANULOCYTES NFR BLD AUTO: 0.5 % (ref 0–0.5)
LYMPHOCYTES # BLD AUTO: 1.4 K/UL (ref 1–4.8)
LYMPHOCYTES NFR BLD: 11.3 % (ref 18–48)
MAGNESIUM SERPL-MCNC: 2.2 MG/DL (ref 1.6–2.6)
MCH RBC QN AUTO: 26.7 PG (ref 27–31)
MCHC RBC AUTO-ENTMCNC: 32 G/DL (ref 32–36)
MCV RBC AUTO: 83 FL (ref 82–98)
METHEMOGLOBIN: 0.3 % (ref 0–3)
MONOCYTES # BLD AUTO: 0.8 K/UL (ref 0.3–1)
MONOCYTES NFR BLD: 6.8 % (ref 4–15)
NEUTROPHILS # BLD AUTO: 9.7 K/UL (ref 1.8–7.7)
NEUTROPHILS NFR BLD: 80.6 % (ref 38–73)
NRBC BLD-RTO: 0 /100 WBC
PCO2 BLDA: 50.2 MMHG (ref 35–45)
PH SMN: 7.49 [PH] (ref 7.35–7.45)
PLATELET # BLD AUTO: 409 K/UL (ref 150–350)
PMV BLD AUTO: 10.7 FL (ref 9.2–12.9)
PO2 BLDA: 38 MMHG (ref 40–60)
POC BE: 15 MMOL/L
POC SATURATED O2: 75 % (ref 95–100)
POC TCO2: 40 MMOL/L (ref 24–29)
POCT GLUCOSE: 155 MG/DL (ref 70–110)
POCT GLUCOSE: 381 MG/DL (ref 70–110)
POCT GLUCOSE: 402 MG/DL (ref 70–110)
POTASSIUM SERPL-SCNC: 4.1 MMOL/L (ref 3.5–5.1)
POTASSIUM SERPL-SCNC: 4.6 MMOL/L (ref 3.5–5.1)
PROT SERPL-MCNC: 8.4 G/DL (ref 6–8.4)
RBC # BLD AUTO: 4.35 M/UL (ref 4.6–6.2)
SAMPLE: ABNORMAL
SITE: ABNORMAL
SODIUM SERPL-SCNC: 127 MMOL/L (ref 136–145)
SODIUM SERPL-SCNC: 130 MMOL/L (ref 136–145)
VARICELLA INTERPRETATION: POSITIVE
VARICELLA ZOSTER IGG: 3.18 ISR (ref 0–0.9)
WBC # BLD AUTO: 11.99 K/UL (ref 3.9–12.7)

## 2020-01-27 PROCEDURE — 83050 HGB METHEMOGLOBIN QUAN: CPT | Mod: NTX

## 2020-01-27 PROCEDURE — 25000003 PHARM REV CODE 250: Mod: NTX | Performed by: INTERNAL MEDICINE

## 2020-01-27 PROCEDURE — 86850 RBC ANTIBODY SCREEN: CPT | Mod: NTX

## 2020-01-27 PROCEDURE — 99497 PR ADVNCD CARE PLAN 30 MIN: ICD-10-PCS | Mod: 25,NTX,, | Performed by: CLINICAL NURSE SPECIALIST

## 2020-01-27 PROCEDURE — 99498 ADVNCD CARE PLAN ADDL 30 MIN: CPT | Mod: NTX,,, | Performed by: CLINICAL NURSE SPECIALIST

## 2020-01-27 PROCEDURE — 63600175 PHARM REV CODE 636 W HCPCS: Mod: NTX | Performed by: NURSE PRACTITIONER

## 2020-01-27 PROCEDURE — 83880 ASSAY OF NATRIURETIC PEPTIDE: CPT | Mod: NTX

## 2020-01-27 PROCEDURE — 99233 SBSQ HOSP IP/OBS HIGH 50: CPT | Mod: NTX,,, | Performed by: CLINICAL NURSE SPECIALIST

## 2020-01-27 PROCEDURE — 25000003 PHARM REV CODE 250: Mod: NTX | Performed by: STUDENT IN AN ORGANIZED HEALTH CARE EDUCATION/TRAINING PROGRAM

## 2020-01-27 PROCEDURE — 85025 COMPLETE CBC W/AUTO DIFF WBC: CPT | Mod: NTX

## 2020-01-27 PROCEDURE — 99291 CRITICAL CARE FIRST HOUR: CPT | Mod: 25,NTX,, | Performed by: NURSE PRACTITIONER

## 2020-01-27 PROCEDURE — 63600367 HC NITRIC OXIDE PER HOUR: Mod: NTX

## 2020-01-27 PROCEDURE — 63600175 PHARM REV CODE 636 W HCPCS: Mod: NTX | Performed by: STUDENT IN AN ORGANIZED HEALTH CARE EDUCATION/TRAINING PROGRAM

## 2020-01-27 PROCEDURE — 82803 BLOOD GASES ANY COMBINATION: CPT | Mod: NTX

## 2020-01-27 PROCEDURE — 99497 ADVNCD CARE PLAN 30 MIN: CPT | Mod: 25,NTX,, | Performed by: CLINICAL NURSE SPECIALIST

## 2020-01-27 PROCEDURE — 99291 PR CRITICAL CARE, E/M 30-74 MINUTES: ICD-10-PCS | Mod: 25,NTX,, | Performed by: NURSE PRACTITIONER

## 2020-01-27 PROCEDURE — C1894 INTRO/SHEATH, NON-LASER: HCPCS | Mod: NTX | Performed by: INTERNAL MEDICINE

## 2020-01-27 PROCEDURE — 99499 NO LOS: ICD-10-PCS | Mod: NTX,,, | Performed by: NURSE PRACTITIONER

## 2020-01-27 PROCEDURE — 99900035 HC TECH TIME PER 15 MIN (STAT): Mod: NTX

## 2020-01-27 PROCEDURE — 99233 PR SUBSEQUENT HOSPITAL CARE,LEVL III: ICD-10-PCS | Mod: NTX,,, | Performed by: CLINICAL NURSE SPECIALIST

## 2020-01-27 PROCEDURE — 25000003 PHARM REV CODE 250: Mod: NTX | Performed by: HOSPITALIST

## 2020-01-27 PROCEDURE — C1751 CATH, INF, PER/CENT/MIDLINE: HCPCS | Mod: NTX | Performed by: INTERNAL MEDICINE

## 2020-01-27 PROCEDURE — 27000221 HC OXYGEN, UP TO 24 HOURS: Mod: NTX

## 2020-01-27 PROCEDURE — 25000003 PHARM REV CODE 250: Mod: NTX | Performed by: NURSE PRACTITIONER

## 2020-01-27 PROCEDURE — 99233 SBSQ HOSP IP/OBS HIGH 50: CPT | Mod: NTX,,, | Performed by: NURSE PRACTITIONER

## 2020-01-27 PROCEDURE — 99499 UNLISTED E&M SERVICE: CPT | Mod: NTX,,, | Performed by: NURSE PRACTITIONER

## 2020-01-27 PROCEDURE — 85730 THROMBOPLASTIN TIME PARTIAL: CPT | Mod: NTX

## 2020-01-27 PROCEDURE — 99498 PR ADVNCD CARE PLAN ADDL 30 MIN: ICD-10-PCS | Mod: NTX,,, | Performed by: CLINICAL NURSE SPECIALIST

## 2020-01-27 PROCEDURE — 20000000 HC ICU ROOM: Mod: NTX

## 2020-01-27 PROCEDURE — 80048 BASIC METABOLIC PNL TOTAL CA: CPT | Mod: NTX

## 2020-01-27 PROCEDURE — 99233 PR SUBSEQUENT HOSPITAL CARE,LEVL III: ICD-10-PCS | Mod: NTX,,, | Performed by: NURSE PRACTITIONER

## 2020-01-27 PROCEDURE — 83735 ASSAY OF MAGNESIUM: CPT | Mod: NTX

## 2020-01-27 PROCEDURE — 63600175 PHARM REV CODE 636 W HCPCS: Mod: NTX | Performed by: HOSPITALIST

## 2020-01-27 PROCEDURE — 36415 COLL VENOUS BLD VENIPUNCTURE: CPT | Mod: NTX

## 2020-01-27 PROCEDURE — 80053 COMPREHEN METABOLIC PANEL: CPT | Mod: NTX

## 2020-01-27 PROCEDURE — 97110 THERAPEUTIC EXERCISES: CPT | Mod: NTX

## 2020-01-27 PROCEDURE — 93451 PR RIGHT HEART CATH O2 SATURATION & CARDIAC OUTPUT: ICD-10-PCS | Mod: 26,NTX,, | Performed by: INTERNAL MEDICINE

## 2020-01-27 PROCEDURE — 93451 RIGHT HEART CATH: CPT | Mod: 26,NTX,, | Performed by: INTERNAL MEDICINE

## 2020-01-27 PROCEDURE — 94761 N-INVAS EAR/PLS OXIMETRY MLT: CPT | Mod: NTX

## 2020-01-27 PROCEDURE — 93451 RIGHT HEART CATH: CPT | Mod: NTX | Performed by: INTERNAL MEDICINE

## 2020-01-27 RX ORDER — LIDOCAINE HYDROCHLORIDE AND EPINEPHRINE 20; 10 MG/ML; UG/ML
INJECTION, SOLUTION INFILTRATION; PERINEURAL
Status: DISCONTINUED | OUTPATIENT
Start: 2020-01-27 | End: 2020-01-27 | Stop reason: HOSPADM

## 2020-01-27 RX ADMIN — TRAMADOL HYDROCHLORIDE 50 MG: 50 TABLET, FILM COATED ORAL at 09:01

## 2020-01-27 RX ADMIN — ISOSORBIDE DINITRATE 10 MG: 10 TABLET ORAL at 02:01

## 2020-01-27 RX ADMIN — HYDRALAZINE HYDROCHLORIDE 50 MG: 50 TABLET ORAL at 05:01

## 2020-01-27 RX ADMIN — INSULIN ASPART 5 UNITS: 100 INJECTION, SOLUTION INTRAVENOUS; SUBCUTANEOUS at 12:01

## 2020-01-27 RX ADMIN — FUROSEMIDE 40 MG/HR: 10 INJECTION, SOLUTION INTRAMUSCULAR; INTRAVENOUS at 06:01

## 2020-01-27 RX ADMIN — EPINEPHRINE 0.04 MCG/KG/MIN: 1 INJECTION PARENTERAL at 10:01

## 2020-01-27 RX ADMIN — CHLOROTHIAZIDE SODIUM 250 MG: 500 INJECTION, POWDER, LYOPHILIZED, FOR SOLUTION INTRAVENOUS at 05:01

## 2020-01-27 RX ADMIN — ACETAMINOPHEN 1000 MG: 500 TABLET ORAL at 02:01

## 2020-01-27 RX ADMIN — ISOSORBIDE DINITRATE 10 MG: 10 TABLET ORAL at 09:01

## 2020-01-27 RX ADMIN — DOBUTAMINE IN DEXTROSE 5 MCG/KG/MIN: 200 INJECTION, SOLUTION INTRAVENOUS at 06:01

## 2020-01-27 RX ADMIN — FUROSEMIDE 40 MG/HR: 10 INJECTION, SOLUTION INTRAMUSCULAR; INTRAVENOUS at 09:01

## 2020-01-27 RX ADMIN — HYDRALAZINE HYDROCHLORIDE 50 MG: 50 TABLET ORAL at 02:01

## 2020-01-27 RX ADMIN — DOBUTAMINE IN DEXTROSE 5 MCG/KG/MIN: 200 INJECTION, SOLUTION INTRAVENOUS at 09:01

## 2020-01-27 RX ADMIN — ACETAMINOPHEN 1000 MG: 500 TABLET ORAL at 09:01

## 2020-01-27 RX ADMIN — Medication 6 MG: at 09:01

## 2020-01-27 RX ADMIN — HEPARIN SODIUM AND DEXTROSE 15 UNITS/KG/HR: 10000; 5 INJECTION INTRAVENOUS at 10:01

## 2020-01-27 RX ADMIN — HYDRALAZINE HYDROCHLORIDE 50 MG: 50 TABLET ORAL at 09:01

## 2020-01-27 RX ADMIN — HEPARIN SODIUM AND DEXTROSE 15 UNITS/KG/HR: 10000; 5 INJECTION INTRAVENOUS at 03:01

## 2020-01-27 RX ADMIN — Medication 800 MG: at 11:01

## 2020-01-27 RX ADMIN — INSULIN ASPART 5 UNITS: 100 INJECTION, SOLUTION INTRAVENOUS; SUBCUTANEOUS at 05:01

## 2020-01-27 RX ADMIN — POTASSIUM CHLORIDE 40 MEQ: 1500 TABLET, EXTENDED RELEASE ORAL at 10:01

## 2020-01-27 RX ADMIN — ISOSORBIDE DINITRATE 10 MG: 10 TABLET ORAL at 05:01

## 2020-01-27 RX ADMIN — FAMOTIDINE 40 MG: 40 POWDER, FOR SUSPENSION ORAL at 10:01

## 2020-01-27 RX ADMIN — CETIRIZINE HYDROCHLORIDE 10 MG: 10 TABLET, FILM COATED ORAL at 10:01

## 2020-01-27 RX ADMIN — POTASSIUM CHLORIDE 40 MEQ: 1500 TABLET, EXTENDED RELEASE ORAL at 09:01

## 2020-01-27 RX ADMIN — Medication 800 MG: at 09:01

## 2020-01-27 RX ADMIN — ATORVASTATIN CALCIUM 40 MG: 20 TABLET, FILM COATED ORAL at 10:01

## 2020-01-27 NOTE — NURSING
Pt transferred down to cath lab for RHC with cath lab RN, transport, and respiratory. Heparin gtt turned off at 0748.

## 2020-01-27 NOTE — PLAN OF CARE
CMICU DAILY GOALS   Nitric oxide increased.  Epinephrine gtt started.  IV chlorothiazide given. CVPs and UOP monitored.  1000 mL fluid restriction in place.  RHC tomorrow.    A: Awake    RASS: Goal - RASS Goal: 0-->alert and calm  Actual - RASS (M[place]illan Agitation-Sedation Scale): 0-->alert and calm   Restraint necessity: Clinical Justification: Removing medical devices  B: Breath   SBT: NA   C: Coordinate A & B, analgesics/sedatives   Pain: managed    SAT: NA  D: Delirium   CAM-ICU: Overall CAM-ICU: Negative  E: Early Mobility   MOVE Screen: Pass   Activity: Activity Management: activity adjusted per tolerance  FAS: Feeding/Nutrition   Diet order: Diet/Nutrition Received: consistent carb/diabetic diet, supplemental drink,   Fluid restriction: Fluid Requirement: 1000 mL  T: Thrombus   DVT prophylaxis: VTE Required Core Measure: Pharmacological prophylaxis initiated/maintained  H: HOB Elevation   Head of Bed (HOB): HOB at 15 degrees  U: Ulcer Prophylaxis   GI: yes  G: Glucose control   managed    S: Skin   Bundle compliance: yes   Bathing/Skin Care: bath, chlorhexidine, bath, complete, dressed/undressed, linen changed Date: Today, 1/26/20 AM shift  B: Bowel Function   no issues , no BM this shift  I: Indwelling Catheters   Mc necessity:      Urethral Catheter 01/15/20 2030 Straight-tip 16 Fr.-Reason for Continuing Urinary Catheterization: Critically ill in ICU requiring intensive monitoring   CVC necessity: Yes   IPAD offered: Yes  D: De-escalation Antibx   No antibiotics ordered   Plan for the day   Monitor hemodynamics and fluid status; RHC tomorrow  Family/Goals of care/Code Status   Code Status: Full Code     No acute events throughout day, VS and assessment per flow sheet, patient progressing towards goals as tolerated, plan of care reviewed with Saba Lott.

## 2020-01-27 NOTE — SUBJECTIVE & OBJECTIVE
**Interval History: Was given another dose of IV Diuril, Radha increased to 20 ppm and Epi at 0.04 mcg/kg/min added overnight 2/2 decrease in sVO2 to 49. Underwent RHC earlier this morning that showed RA 12 (CVP via PICC this morning 20), PAP 38/22 (30), W 20 and CO/CI 4.11/2.06. Net -ve 1 L past 24 hours on Lasix gtt at 40 mg/hr plus prn Diuril. Last weight recorded was on 1/25, and was down 57# since admit. Feels generally well. Advanced to step 4    Continuous Infusions:   DOBUTamine 5 mcg/kg/min (01/27/20 0800)    epinephrine infustion (NON-TITRATING) 0.04 mcg/kg/min (01/27/20 0800)    furosemide (LASIX) 10 mg/mL infusion (non-titrating) 40 mg/hr (01/27/20 0800)    heparin (porcine) in D5W Stopped (01/27/20 0748)    nitric oxide gas       Scheduled Meds:   acetaminophen  1,000 mg Oral Q8H    atorvastatin  40 mg Oral Daily    cetirizine  10 mg Oral Daily    famotidine  40 mg Oral Daily    hydrALAZINE  50 mg Oral Q8H    isosorbide dinitrate  10 mg Oral Q8H    magnesium oxide  800 mg Oral BID    magnesium sulfate IVPB  1 g Intravenous Once    melatonin  6 mg Oral Nightly    polyethylene glycol  17 g Oral Daily    potassium chloride  40 mEq Oral BID    senna-docusate 8.6-50 mg  2 tablet Oral BID    triamcinolone acetonide 0.5%   Topical (Top) TID     PRN Meds:albuterol, bisacodyl, Dextrose 10% Bolus, Dextrose 10% Bolus, glucagon (human recombinant), glucose, glucose, heparin (PORCINE), heparin (PORCINE), hydrOXYzine HCl, insulin aspart U-100, sodium chloride 0.9%, traMADol    Review of patient's allergies indicates:   Allergen Reactions    Iodine and iodide containing products      Objective:     Vital Signs (Most Recent):  Temp: 98.1 °F (36.7 °C) (01/27/20 0701)  Pulse: 105 (01/27/20 0801)  Resp: 18 (01/27/20 0801)  BP: 105/69 (01/27/20 0800)  SpO2: 100 % (01/27/20 0801) Vital Signs (24h Range):  Temp:  [97.5 °F (36.4 °C)-98.3 °F (36.8 °C)] 98.1 °F (36.7 °C)  Pulse:  [102-117] 105  Resp:  [14-34]  18  SpO2:  [94 %-100 %] 100 %  BP: ()/(51-83) 105/69     Patient Vitals for the past 72 hrs (Last 3 readings):   Weight   01/25/20 0300 88.1 kg (194 lb 3.6 oz)     Body mass index is 30.42 kg/m².      Intake/Output Summary (Last 24 hours) at 1/27/2020 1106  Last data filed at 1/27/2020 0800  Gross per 24 hour   Intake 1677.55 ml   Output 3105 ml   Net -1427.45 ml       Hemodynamic Parameters:       Telemetry: ST    Physical Exam   Constitutional: He is oriented to person, place, and time. He appears well-developed and well-nourished.   HENT:   Head: Normocephalic and atraumatic.   Eyes: Pupils are equal, round, and reactive to light. Conjunctivae and EOM are normal.   Neck: Normal range of motion. Neck supple. No JVD present. No thyromegaly present.   Cardiovascular: Regular rhythm.   Tachycardic, + S3. Grade II/VI systolic murmur LSB   Pulmonary/Chest: Effort normal and breath sounds normal.   Abdominal: Soft. Bowel sounds are normal.   Musculoskeletal: Normal range of motion. He exhibits no edema.   Neurological: He is alert and oriented to person, place, and time.   Skin: Skin is warm and dry. Capillary refill takes 2 to 3 seconds.   Psychiatric: He has a normal mood and affect. His behavior is normal. Judgment and thought content normal.       Significant Labs:  CBC:  Recent Labs   Lab 01/25/20  0431 01/26/20  0352 01/27/20  0409   WBC 10.50 10.27 11.99   RBC 4.55* 4.48* 4.35*   HGB 12.0* 11.9* 11.6*   HCT 38.0* 37.5* 36.2*    344 409*   MCV 84 84 83   MCH 26.4* 26.6* 26.7*   MCHC 31.6* 31.7* 32.0     BNP:  Recent Labs   Lab 01/21/20  0755 01/22/20  1752   BNP 3,053* 2,342*     CMP:  Recent Labs   Lab 01/24/20  1625  01/26/20  0352 01/26/20  1528 01/26/20  2101 01/27/20  0410   *   < > 122* 197* 321* 245*   CALCIUM 9.5   < > 10.2 10.0 9.1 9.8   ALBUMIN 3.5  --  3.9  --   --  3.9   PROT 7.8  --  8.6*  --   --  8.4   *  128*   < > 133* 131* 128* 130*   K 4.3   < > 4.5 5.2* 3.9 4.1   CO2  36*   < > 32* 33* 33* 32*   CL 82*   < > 86* 84* 84* 84*   BUN 32*   < > 36* 40* 41* 41*   CREATININE 2.0*   < > 1.9* 2.2* 2.2* 2.0*   ALKPHOS 104  --  99  --   --  97   ALT 22  --  27  --   --  27   AST 31  --  38  --   --  31   BILITOT 1.1*  --  1.2*  --   --  1.3*    < > = values in this interval not displayed.      Coagulation:   Recent Labs   Lab 01/23/20  0400  01/26/20  1045 01/26/20  1807 01/27/20  0515   INR 1.4*  --   --   --   --    APTT 47.2*   < > 36.4* 72.8* 44.6*    < > = values in this interval not displayed.     LDH:  No results for input(s): LDH in the last 72 hours.  Microbiology:  Microbiology Results (last 7 days)     ** No results found for the last 168 hours. **          I have reviewed all pertinent labs within the past 24 hours.    Estimated Creatinine Clearance: 44.2 mL/min (A) (based on SCr of 2 mg/dL (H)).    Diagnostic Results:  I have reviewed all pertinent imaging results/findings within the past 24 hours.

## 2020-01-27 NOTE — CONSULTS
Palliative Care Acknowledgement of Consult - .date    Consult received. Palliative Care Provider:_Alena Leonardo, [place], APRN, AGCNS_ will touch base with team prior to seeing patient. Full consult to follow.    Thank you for allowing us to be a part of the care of this patient.          Barbara Romero LCSW, ACHP-SW

## 2020-01-27 NOTE — NURSING
Contacted Dr. Westbrook re: pt's latest SvO2 value.  Communicated latest BMP values - potassium, BUN/Cre, sodium.

## 2020-01-27 NOTE — PROGRESS NOTES
[facility]  Heart Transplant  Progress Note    Patient Name: Saba Lott  MRN: [id number]  Admission Date: 1/15/2020  Hospital Length of Stay: 12 days  Attending Physician: Lian Daniel MD  Primary Care Provider: Primary Doctor No  Principal Problem:Cardiogenic shock    Subjective:     **Interval History: Was given another dose of IV Diuril, Radha increased to 20 ppm and Epi at 0.04 mcg/kg/min added overnight 2/2 decrease in sVO2 to 49. Underwent RHC earlier this morning that showed RA 12 (CVP via PICC this morning 20), PAP 38/22 (30), W 20 and CO/CI 4.11/2.06. Net -ve 1 L past 24 hours on Lasix gtt at 40 mg/hr plus prn Diuril. Last weight recorded was on 1/25, and was down 57# since admit. Feels generally well. Advanced to step 4    Continuous Infusions:   DOBUTamine 5 mcg/kg/min (01/27/20 0800)    epinephrine infustion (NON-TITRATING) 0.04 mcg/kg/min (01/27/20 0800)    furosemide (LASIX) 10 mg/mL infusion (non-titrating) 40 mg/hr (01/27/20 0800)    heparin (porcine) in D5W Stopped (01/27/20 0748)    nitric oxide gas       Scheduled Meds:   acetaminophen  1,000 mg Oral Q8H    atorvastatin  40 mg Oral Daily    cetirizine  10 mg Oral Daily    famotidine  40 mg Oral Daily    hydrALAZINE  50 mg Oral Q8H    isosorbide dinitrate  10 mg Oral Q8H    magnesium oxide  800 mg Oral BID    magnesium sulfate IVPB  1 g Intravenous Once    melatonin  6 mg Oral Nightly    polyethylene glycol  17 g Oral Daily    potassium chloride  40 mEq Oral BID    senna-docusate 8.6-50 mg  2 tablet Oral BID    triamcinolone acetonide 0.5%   Topical (Top) TID     PRN Meds:albuterol, bisacodyl, Dextrose 10% Bolus, Dextrose 10% Bolus, glucagon (human recombinant), glucose, glucose, heparin (PORCINE), heparin (PORCINE), hydrOXYzine HCl, insulin aspart U-100, sodium chloride 0.9%, traMADol    Review of patient's allergies indicates:   Allergen Reactions    Iodine and iodide containing products      Objective:      Vital Signs (Most Recent):  Temp: 98.1 °F (36.7 °C) (01/27/20 0701)  Pulse: 105 (01/27/20 0801)  Resp: 18 (01/27/20 0801)  BP: 105/69 (01/27/20 0800)  SpO2: 100 % (01/27/20 0801) Vital Signs (24h Range):  Temp:  [97.5 °F (36.4 °C)-98.3 °F (36.8 °C)] 98.1 °F (36.7 °C)  Pulse:  [102-117] 105  Resp:  [14-34] 18  SpO2:  [94 %-100 %] 100 %  BP: ()/(51-83) 105/69     Patient Vitals for the past 72 hrs (Last 3 readings):   Weight   01/25/20 0300 88.1 kg (194 lb 3.6 oz)     Body mass index is 30.42 kg/m².      Intake/Output Summary (Last 24 hours) at 1/27/2020 1106  Last data filed at 1/27/2020 0800  Gross per 24 hour   Intake 1677.55 ml   Output 3105 ml   Net -1427.45 ml       Hemodynamic Parameters:       Telemetry: ST    Physical Exam   Constitutional: He is oriented to person, place, and time. He appears well-developed and well-nourished.   HENT:   Head: Normocephalic and atraumatic.   Eyes: Pupils are equal, round, and reactive to light. Conjunctivae and EOM are normal.   Neck: Normal range of motion. Neck supple. No JVD present. No thyromegaly present.   Cardiovascular: Regular rhythm.   Tachycardic, + S3. Grade II/VI systolic murmur LSB   Pulmonary/Chest: Effort normal and breath sounds normal.   Abdominal: Soft. Bowel sounds are normal.   Musculoskeletal: Normal range of motion. He exhibits no edema.   Neurological: He is alert and oriented to person, place, and time.   Skin: Skin is warm and dry. Capillary refill takes 2 to 3 seconds.   Psychiatric: He has a normal mood and affect. His behavior is normal. Judgment and thought content normal.       Significant Labs:  CBC:  Recent Labs   Lab 01/25/20  0431 01/26/20  0352 01/27/20  0409   WBC 10.50 10.27 11.99   RBC 4.55* 4.48* 4.35*   HGB 12.0* 11.9* 11.6*   HCT 38.0* 37.5* 36.2*    344 409*   MCV 84 84 83   MCH 26.4* 26.6* 26.7*   MCHC 31.6* 31.7* 32.0     BNP:  Recent Labs   Lab 01/21/20  0755 01/22/20  1752   BNP 3,053* 2,342*     CMP:  Recent  Labs   Lab 01/24/20  1625  01/26/20  0352 01/26/20  1528 01/26/20  2101 01/27/20  0410   *   < > 122* 197* 321* 245*   CALCIUM 9.5   < > 10.2 10.0 9.1 9.8   ALBUMIN 3.5  --  3.9  --   --  3.9   PROT 7.8  --  8.6*  --   --  8.4   *  128*   < > 133* 131* 128* 130*   K 4.3   < > 4.5 5.2* 3.9 4.1   CO2 36*   < > 32* 33* 33* 32*   CL 82*   < > 86* 84* 84* 84*   BUN 32*   < > 36* 40* 41* 41*   CREATININE 2.0*   < > 1.9* 2.2* 2.2* 2.0*   ALKPHOS 104  --  99  --   --  97   ALT 22  --  27  --   --  27   AST 31  --  38  --   --  31   BILITOT 1.1*  --  1.2*  --   --  1.3*    < > = values in this interval not displayed.      Coagulation:   Recent Labs   Lab 01/23/20  0400  01/26/20  1045 01/26/20  1807 01/27/20  0515   INR 1.4*  --   --   --   --    APTT 47.2*   < > 36.4* 72.8* 44.6*    < > = values in this interval not displayed.     LDH:  No results for input(s): LDH in the last 72 hours.  Microbiology:  Microbiology Results (last 7 days)     ** No results found for the last 168 hours. **          I have reviewed all pertinent labs within the past 24 hours.    Estimated Creatinine Clearance: 44.2 mL/min (A) (based on SCr of 2 mg/dL (H)).    Diagnostic Results:  I have reviewed all pertinent imaging results/findings within the past 24 hours.    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 Chronic MARC - Class II-III and mild LE edema.      Hospital Course (synopsis of major diagnoses, care, treatment, and services provided during the course of the hospital stay):  -2/12 admit to CDU for diuresis with IV lasix  -IV abx   -CXR with suspected small left pleural effusion with associated left basilar atelectasis versus evolving airspace disease  -2/13 single chamber ICD implant; IV lasix decreased to BID  -2/16 add dobutamine,  hold BB due to hypotension, no ACE-I or ARB due to recent HPI hypotension  -2/17 Lasix changed to PO  -2/18 dobutamine discontinued    Admitted to [facility] on Thursday due to severe SOB for a week with associated orthopnea, MARC, and PND unable to lie flat and found to be in acute diastolic heart failure. States he normally weighs around 219 but up to 254lb on admission tonight. Says he hasn't been the most compliant in terms of fluid restriction and daily weights but has avoided salt. BNP on admission was 2375. BUN/CRT 32/1.4 He was started on IV diuretics but continued to deteriorate. Creatinine continued to increase and reached 4.3 with oliguria. He was started on CRRT for a few days and tolerated well. Renal u/s was negative for obstruction and liver u/s without findings of cirrhosis. All of this was progression of cardiorenal syndrome with liver congestion from severe volume overload. On arrival vitals stable and in no acute distress. He has obvious anasarca up to the chest. He did have uop of 500 at time of arrival also.    TTE 5/28/19  · Moderate left ventricular enlargement.  · Severely decreased left ventricular systolic function. The estimated ejection fraction is 20%  · Global hypokinetic wall motion.  · Grade III (severe) left ventricular diastolic dysfunction consistent with restrictive physiology.  · Severe left atrial enlargement.  · Moderate right ventricular enlargement.  · Low normal right ventricular systolic function.  · Mild right atrial enlargement.  · Moderate mitral regurgitation.  Mild to moderate tricuspid regurgitation    * Cardiogenic shock  -NICM; Likely Etoh induced  -Transferred from [facility] with IABP at 1:1 for further level of care. IABP removed 1/25  -Last 2D Echo 1/23/20: LVEF 20%, LVEDD 6.46 cm, RV mod dilated and mild to mod depressed, IVC 15  -RHC this morning on  5, Epi 0.04, Radha 20 ppm and Lasix 40 mg/hr: RA 12 (not correlating with CVP of 20 by PICC  this morning), PAP 38/22 (30), W 20 and CO/CI 4.11/2.06. Will continue same regimen for now  -GDMT: Continue Hyd/Isordil       - Advanced to step 4 pathway for VAD/OHT. Speech cleared him from  cognitive assessment stand point but nurses have concern abt his cognition        -Plan to present at [facility] this Wednesday.         Acute hyperglycemia  -HgA1C 6.6  -Endocrine following    Morbid obesity  -Weight down 57# since admit with diuresis, making BMI now 31.3    ANJELICA (acute kidney injury)  -Creatinine 2.0 today. Was 3.0 on admit and got as high as 4.3 at OSH prior to transfer. Renal function was normal 8 months ago  -Cont to monitor with diuresis     Essential hypertension  -Currently essentially normotensive   -Continue Hyd/Isordil    Deep vein thrombosis (DVT) of right lower extremity  -Unsure of timing but was on eliquis PTA.   -Continue on heparin gtt for now      AICD (automatic cardioverter/defibrillator) present  -Medtronic placed 2019 for primary prevention      Uninterrupted Critical Care/Counseling Time (not including procedures): 30 [place] [id number]  Heart Transplant  [facility]

## 2020-01-27 NOTE — PT/OT/SLP PROGRESS
Physical Therapy Treatment    Patient Name:  Saba Lott   MRN:  [id number]    Recommendations:     Discharge Recommendations:  outpatient PT   Discharge Equipment Recommendations: none   Barriers to discharge: None    Assessment:     Saba Lott is a 54 y.o. male admitted with a medical diagnosis of Cardiogenic shock.  He presents with the following impairments/functional limitations:  weakness, impaired endurance, impaired functional mobilty, gait instability, impaired balance, impaired cardiopulmonary response to activity. Pt tolerated activity with improved bed mobility, increased tolerance to exercise. Session focused on B LE strength and sit <> stand transfers without reliance on B UE given potential future sternal precautions in light of advanced cardiac options workup. Pt able to stand from bedside without assistance, however, still requires minimum assistance to stand from bedside chair without use of UE. Pt demo'd improved strength and decreased assistance required for transfer. Pt demo's motivation to participate with therapy and progress toward more independent mobility. Pt educated on plan to sit in chair daily, only transferring to bed when preparing to sleep. Information relayed to nursing staff.  Pt would continue to benefit from acute skilled therapy intervention to address deficits and progress toward prior level of function.       Rehab Prognosis: Good; patient would benefit from acute skilled PT services to address these deficits and reach maximum level of function.    Recent Surgery: Procedure(s) (LRB):  INSERTION, CATHETER, RIGHT HEART (Right) Day of Surgery    Plan:     During this hospitalization, patient to be seen 4 x/week to address the identified rehab impairments via therapeutic activities, gait training, therapeutic exercises, neuromuscular re-education and progress toward the following goals:    · Plan of Care Expires:  02/23/20    Subjective     Chief Complaint: Pt with no complaints  at this time   Patient/Family Comments/goals: to get better and return home   Pain/Comfort:  · Pain Rating 1: 0/10  · Pain Rating Post-Intervention 1: 0/10      Objective:     Communicated with RN prior to session.  Patient found HOB elevated with arterial line, blood pressure cuff, pulse ox (continuous), telemetry, peripheral IV, barton catheter, oxygen(nitric oxide) upon PT entry to room.     General Precautions: Standard, fall   Orthopedic Precautions:N/A   Braces: N/A     Functional Mobility:  · Bed Mobility:     · Supine to Sit: supervision  · Transfers:     · Sit to Stand: 1x from EOB with  stand by assistance with no AD, no use of UE. Multiple sit <> stand from chair with minimum assistance with no use of UE.   · Bed to Chair: stand by assistance with  no AD  using  Step Transfer  · Gait: Pt ambulated 10 feet from bed to chair with no AD and stand by assistance. Pt with no LOB, no SOB, no dizziness. Pt demo'd small step size, antalgic gait, decreased stance time on L LE, excessive lateral sway.       AM-PAC 6 CLICK MOBILITY  Turning over in bed (including adjusting bedclothes, sheets and blankets)?: 3  Sitting down on and standing up from a chair with arms (e.g., wheelchair, bedside commode, etc.): 3  Moving from lying on back to sitting on the side of the bed?: 3  Moving to and from a bed to a chair (including a wheelchair)?: 3  Need to walk in hospital room?: 3  Climbing 3-5 steps with a railing?: 3  Basic Mobility Total Score: 18       Therapeutic Activities and Exercises:   Pt performed 3 sit <> stand transfers from chair with minimum assistance. Prior to each transfer, pt performed 5 anterior weight shift of trunk with clearing of buttocks from chair, then return to sitting. Facilitation required to promote anterior weight shift.   Pt then performed 5x mini squats with cuing for powerful hip extension to return to upright standing.   Extensive education provided regarding proper body mechanics for  successful sit <> stand transfer without use of AD. Pt demo'd understanding.   Pt educated on role of PT/POC. Pt verbalized understanding.   Pt encouraged to only perform OOB mobility with assistance from nursing/therapy. Pt agreeable.   Pt educated on seated exercises to perform 20x, 3x/day. Exercises included bilateral LAQ, marching, ankle DF/PF  Pt encouraged to sit up in chair daily, only transfer to bed for sleeping. Pt agreeable. Information relayed to nursing staff.     Patient left up in chair with all lines intact, call button in reach and RN notified..    GOALS:   Multidisciplinary Problems     Physical Therapy Goals        Problem: Physical Therapy Goal    Goal Priority Disciplines Outcome Goal Variances Interventions   Physical Therapy Goal     PT, PT/OT Ongoing, Progressing     Description:  Goals to be met by: 2020     Patient will increase functional independence with mobility by performin. Supine to sit with Set-up Johnston- met 2020  2. Sit to stand transfer with Supervision  3. Gait  x 150 feet with Supervision using LRAD or no AD   4. Lower extremity exercise program x10 reps per handout, with independence                       Time Tracking:     PT Received On: 20  PT Start Time: 1359     PT Stop Time: 1430  PT Total Time (min): 31 min     Billable Minutes: Therapeutic Exercise 31 mins     Treatment Type: Treatment  PT/PTA: PT     PTA Visit Number: 0     Maren Barnes, PT  2020

## 2020-01-27 NOTE — CARE UPDATE
BG goal 140 -180    BG excursions noted. Appear to be prandial in nature. However, patient responding well to PRN Novolog correction scale.     Continue:    Low Dose SQ Insulin Correction Scale PRN BG excursions. Patient has no dx of DM.    BG Monitoring AC/HS      ** Please call Endocrine for any BG related issues **  ** Please notify Endocrine for any change and/or advance in diet**     Discharge Planning:    TBD. Please notify endocrinology prior to discharge.

## 2020-01-27 NOTE — NURSING
Spoke with PARKER Huber with cath lab states RHC through [facility] was successful. Wedge pressure were taken and pt will shortly be arriving back to CMICU once respiratory is at the bedside.
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
## 2020-01-27 NOTE — ASSESSMENT & PLAN NOTE
-Creatinine 2.0 today. Was 3.0 on admit and got as high as 4.3 at OSH prior to transfer. Renal function was normal 8 months ago  -Cont to monitor with diuresis
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
## 2020-01-27 NOTE — PLAN OF CARE
[facility]  Palliative Care   Follow Up Note:    Patient Name: Saba Lott  MRN: [id number]  Admission Date: 1/15/2020  Hospital Length of Stay: 12 days  Code Status: Full Code   Attending Provider: Lian Daniel MD  Palliative Care Provider: GRETA Gutierrez, APRN, AGCNS  Primary Care Physician: Primary Doctor No  Principal Problem:Cardiogenic shock     Present during Palliative Care LVAD Preparedness planning with STEVEN Leonardo. Assisted with completing Medical Power of  for pt.     MPOA Completed.   MPOA: sister: Frank Holbrook: [phone]  2nd MPOA: Ex wife: Radha Ruth: [phone]    Demographics changed in system. Scanned in POA form.      Barbara Romero LCSW, ACHP-SW

## 2020-01-27 NOTE — NURSING
CMICU DAILY GOALS       A: Awake    RASS: Goal - RASS Goal: 0-->alert and calm  Actual - RASS (Mcmillan Agitation-Sedation Scale): 0-->alert and calm   Restraint necessity: Clinical Justification: Removing medical devices  B: Breath   SBT: Not intubated   C: Coordinate A & B, analgesics/sedatives   Pain: managed    SAT: Not intubated  D: Delirium   CAM-ICU: Overall CAM-ICU: Negative  E: Early Mobility   MOVE Screen: Pass   Activity: Activity Management: activity adjusted per tolerance  FAS: Feeding/Nutrition   Diet order: Diet/Nutrition Received: consistent carb/diabetic diet,   Fluid restriction: Fluid Requirement: 1000 CC  T: Thrombus   DVT prophylaxis: VTE Required Core Measure: Pharmacological prophylaxis initiated/maintained  H: HOB Elevation   Head of Bed (HOB): HOB at 15 degrees  U: Ulcer Prophylaxis   GI: yes  G: Glucose control   managed    S: Skin   Bundle compliance: yes   Bathing/Skin Care: bath, chlorhexidine, bath, complete, dressed/undressed, incontinence care Date: 1/25  B: Bowel Function   no issues   I: Indwelling Catheters   Mc necessity:      Urethral Catheter 01/15/20 2030 Straight-tip 16 Fr.-Reason for Continuing Urinary Catheterization: Critically ill in ICU requiring intensive monitoring   CVC necessity: Yes   IPAD offered: Yes  D: De-escalation Antibx   Yes  Plan for the day   RHC Monday  Family/Goals of care/Code Status   Code Status: Full Code     No acute events throughout day, VS and assessment per flow sheet, patient progressing towards goals as tolerated, plan of care reviewed with Saba Lott and family, all concerns addressed, will continue to monitor.

## 2020-01-27 NOTE — NURSING
Contacted pharmacy.  Per pharmacy resident, dobutamine gtt and epinephrine gtt compatible with [employer] database.

## 2020-01-27 NOTE — PLAN OF CARE
Updated hemodynamics this evening:     CVP 20, SVO2 70, UOP 1.35 over day; 200cc/hr since IV diuril at 1600.  CO 5.35, CI 3.11,  on dobutamine 5 mcg/kg/min, epi 0.04 mcg/kg/min, lasix 40 mg/hr, Radha 20  Will give addition IV diuril and continue monitoring UOP.  Plan of care discussed with Dr. Michelet Diana MD  Cardiology, PGY IV  Pager: [phone]

## 2020-01-27 NOTE — HPI
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 Chronic MARC - Class II-III and mild LE edema.        Per chart review, pt was admitted to [facility] on Thursday due to severe SOB for a week with associated orthopnea, MARC, and PND unable to lie flat and found to be in acute diastolic heart failure. Pt stated he normally weighs around 219 but up to 254lb on admission tonight. Says he hasn't been the most compliant in terms of fluid restriction and daily weights but has avoided salt. BNP on admission was 2375. BUN/CRT 32/1.4 He was started on IV diuretics but continued to deteriorate. Creatinine continued to increase and reached 4.3 with oliguria. He was started on CRRT for a few days and tolerated well. Renal u/s was negative for obstruction and liver u/s without findings of cirrhosis. All of this was progression of cardiorenal syndrome with liver congestion from severe volume overload. On arrival vitals stable and in no acute distress. He had obvious anasarca up to the chest. He did have uop of 500 at time of arrival also.

## 2020-01-27 NOTE — ASSESSMENT & PLAN NOTE
"Impression: Pt is a 55 y/o male with advanced heart failure. Pt is process of LVAD work-up. Pt is alert, oriented to person, place, time, and situation. Pt is a Full code.     Discussion conducted with the pt who reported his goals for health care for getting an LVAD is to "not feel so tired and out of breath all the time."  The pts chief concern/fear reported pertaining to receiving an LVAD and the impact on his/her life was getting used to living with LVAD. Per pt, he was told about an LVAD about a week ago and is getting used to idea of having LVAD.     The need for preparedness planning was discussed with special attention and in reference to:  a) device failure b) suboptimal QOL post LVAD procedure, c) impact on co- morbid conditions, and d) catastrophic complications such as stroke, renal failure/hemodialysis or other chronic critical illness. In particular, the following points should be noted in the event of:  1) Device failure: Spoke to pt about device failure. Pt verbalized understanding this could lead to further complications, including death.  2) a) Post LVAD QOL goals are: Per pt, he wants to be "around longer." Per pt, if possible, he would like to be able to end up getting a heart transplant.  Pt reports, he does not want to feel so fatigued and SOB like he has been at home.    b) Chronic poor QOL is defined as: feeling tired and sob all the time and unable to do the things he likes to do including like cooking, cleaning, and hanging out with family and friends.        3)   Catastrophic Complications:  Discussed with pt. Prior to conversation, pt unable to list any catastrophic complications. Stroke, head bleed/gi bleed, renal failure, drive line infection that can lead to sepsis and further complications discussed. Spoke to pt about speaking to his sister who he designated as MPOA about his wishes if a complication like these occurred. Per pt, "He would never want to be a "vegetable in bed." "     Advance directives:   MPOA- Pt designated his sister  Robson Holbrook to be MPOA.   Spoke to pt about the importance of speaking to his sister about his wishes if a catastrophic complication occurred. Pt reported he would speak to her.        During the discussion the pt/(caregiver) demonstrated __excellent ___good   _X_marginal__poor insight/understanding concerning the risk vs benefits of obtaining an LVAD

## 2020-01-27 NOTE — PT/OT/SLP PROGRESS
Occupational Therapy      Patient Name:  Saba Lott   MRN:  [id number]    Patient not seen today secondary to to off unit this AM and OT unable to return again this afternoon. OT to check status at later date.   KARLY Castillo  1/27/2020

## 2020-01-27 NOTE — CONSULTS
[facility]  Infectious Disease  Consult Note    Patient Name: Saba Lott  MRN: [id number]  Admission Date: 1/15/2020  Hospital Length of Stay: 12 days  Attending Physician: Lian Daniel MD  Primary Care Provider: Primary Doctor No     Isolation Status: No active isolations      Inpatient consult to Infectious Diseases  Consult performed by: DOMINGO Perez, ANP  Consult ordered by: Mallika Lugo NP  Reason for consult: LVAD/heart transplant        ID Consult acknowledged.   Full consult and recommendations to follow  In the interim, please call for any immediate concerns.     Thank you.   DOMINGO Cabezas, ANP-C  [phone] pager,  [phone]

## 2020-01-28 ENCOUNTER — DOCUMENTATION ONLY (OUTPATIENT)
Dept: TRANSFUSION MEDICINE | Facility: HOSPITAL | Age: 55
End: 2020-01-28
Payer: MEDICARE

## 2020-01-28 DIAGNOSIS — Z76.82 HEART TRANSPLANT CANDIDATE: ICD-10-CM

## 2020-01-28 LAB
ALBUMIN SERPL BCP-MCNC: 3.7 G/DL (ref 3.5–5.2)
ALLENS TEST: ABNORMAL
ALP SERPL-CCNC: 90 U/L (ref 55–135)
ALT SERPL W/O P-5'-P-CCNC: 25 U/L (ref 10–44)
ANION GAP SERPL CALC-SCNC: 14 MMOL/L (ref 8–16)
APTT BLDCRRT: 43.7 SEC (ref 21–32)
AST SERPL-CCNC: 35 U/L (ref 10–40)
BASOPHILS # BLD AUTO: 0.06 K/UL (ref 0–0.2)
BASOPHILS NFR BLD: 0.5 % (ref 0–1.9)
BILIRUB SERPL-MCNC: 1.3 MG/DL (ref 0.1–1)
BUN SERPL-MCNC: 48 MG/DL (ref 6–20)
CALCIUM SERPL-MCNC: 9.4 MG/DL (ref 8.7–10.5)
CHLORIDE SERPL-SCNC: 84 MMOL/L (ref 95–110)
CLASS I ANTIBODY COMMENTS - LUMINEX: NORMAL
CLASS II ANTIBODY COMMENTS - LUMINEX: NORMAL
CO2 SERPL-SCNC: 30 MMOL/L (ref 23–29)
CPRA %: 0
CREAT SERPL-MCNC: 2.1 MG/DL (ref 0.5–1.4)
DELSYS: ABNORMAL
DIFFERENTIAL METHOD: ABNORMAL
EOSINOPHIL # BLD AUTO: 0.1 K/UL (ref 0–0.5)
EOSINOPHIL NFR BLD: 0.7 % (ref 0–8)
ERYTHROCYTE [DISTWIDTH] IN BLOOD BY AUTOMATED COUNT: 17.7 % (ref 11.5–14.5)
EST. GFR  (AFRICAN AMERICAN): 39.8 ML/MIN/1.73 M^2
EST. GFR  (NON AFRICAN AMERICAN): 34.4 ML/MIN/1.73 M^2
GLUCOSE SERPL-MCNC: 236 MG/DL (ref 70–110)
HCO3 UR-SCNC: 35.5 MMOL/L (ref 24–28)
HCT VFR BLD AUTO: 33.8 % (ref 40–54)
HGB BLD-MCNC: 10.8 G/DL (ref 14–18)
IMM GRANULOCYTES # BLD AUTO: 0.05 K/UL (ref 0–0.04)
IMM GRANULOCYTES NFR BLD AUTO: 0.4 % (ref 0–0.5)
LYMPHOCYTES # BLD AUTO: 1.6 K/UL (ref 1–4.8)
LYMPHOCYTES NFR BLD: 14.4 % (ref 18–48)
MAGNESIUM SERPL-MCNC: 2.2 MG/DL (ref 1.6–2.6)
MCH RBC QN AUTO: 26.6 PG (ref 27–31)
MCHC RBC AUTO-ENTMCNC: 32 G/DL (ref 32–36)
MCV RBC AUTO: 83 FL (ref 82–98)
METHEMOGLOBIN: 0.3 % (ref 0–3)
MONOCYTES # BLD AUTO: 0.7 K/UL (ref 0.3–1)
MONOCYTES NFR BLD: 6.3 % (ref 4–15)
NEUTROPHILS # BLD AUTO: 8.8 K/UL (ref 1.8–7.7)
NEUTROPHILS NFR BLD: 77.7 % (ref 38–73)
NRBC BLD-RTO: 0 /100 WBC
PCO2 BLDA: 50 MMHG (ref 35–45)
PH SMN: 7.46 [PH] (ref 7.35–7.45)
PLATELET # BLD AUTO: 414 K/UL (ref 150–350)
PMV BLD AUTO: 10.3 FL (ref 9.2–12.9)
PO2 BLDA: 40 MMHG (ref 40–60)
POC BE: 12 MMOL/L
POC SATURATED O2: 77 % (ref 95–100)
POC TCO2: 37 MMOL/L (ref 24–29)
POCT GLUCOSE: 120 MG/DL (ref 70–110)
POCT GLUCOSE: 123 MG/DL (ref 70–110)
POCT GLUCOSE: 187 MG/DL (ref 70–110)
POTASSIUM SERPL-SCNC: 4.3 MMOL/L (ref 3.5–5.1)
PREALB SERPL-MCNC: 23 MG/DL (ref 20–43)
PROT SERPL-MCNC: 7.9 G/DL (ref 6–8.4)
RBC # BLD AUTO: 4.06 M/UL (ref 4.6–6.2)
SAMPLE: ABNORMAL
SERUM COLLECTION DT - LUMINEX CLASS I: NORMAL
SERUM COLLECTION DT - LUMINEX CLASS II: NORMAL
SITE: ABNORMAL
SODIUM SERPL-SCNC: 128 MMOL/L (ref 136–145)
SPCL1 TESTING DATE: NORMAL
SPCL2 TESTING DATE: NORMAL
SPLUA TESTING DATE: NORMAL
WBC # BLD AUTO: 11.37 K/UL (ref 3.9–12.7)

## 2020-01-28 PROCEDURE — 97116 GAIT TRAINING THERAPY: CPT | Mod: NTX

## 2020-01-28 PROCEDURE — 85025 COMPLETE CBC W/AUTO DIFF WBC: CPT | Mod: NTX

## 2020-01-28 PROCEDURE — 25000003 PHARM REV CODE 250: Mod: NTX | Performed by: STUDENT IN AN ORGANIZED HEALTH CARE EDUCATION/TRAINING PROGRAM

## 2020-01-28 PROCEDURE — 99291 CRITICAL CARE FIRST HOUR: CPT | Mod: NTX,,, | Performed by: NURSE PRACTITIONER

## 2020-01-28 PROCEDURE — 25000003 PHARM REV CODE 250: Mod: NTX | Performed by: HOSPITALIST

## 2020-01-28 PROCEDURE — 63600367 HC NITRIC OXIDE PER HOUR: Mod: NTX

## 2020-01-28 PROCEDURE — 99900035 HC TECH TIME PER 15 MIN (STAT): Mod: NTX

## 2020-01-28 PROCEDURE — 20000000 HC ICU ROOM: Mod: NTX

## 2020-01-28 PROCEDURE — 80500 PR  LAB PATHOLOGY CONSULT-LTD: CPT | Mod: ,,, | Performed by: PATHOLOGY

## 2020-01-28 PROCEDURE — 97535 SELF CARE MNGMENT TRAINING: CPT | Mod: NTX

## 2020-01-28 PROCEDURE — 25000003 PHARM REV CODE 250: Mod: NTX | Performed by: NURSE PRACTITIONER

## 2020-01-28 PROCEDURE — 99231 PR SUBSEQUENT HOSPITAL CARE,LEVL I: ICD-10-PCS | Mod: NTX,,, | Performed by: NURSE PRACTITIONER

## 2020-01-28 PROCEDURE — 25000003 PHARM REV CODE 250: Mod: NTX | Performed by: INTERNAL MEDICINE

## 2020-01-28 PROCEDURE — 83735 ASSAY OF MAGNESIUM: CPT | Mod: NTX

## 2020-01-28 PROCEDURE — 63600175 PHARM REV CODE 636 W HCPCS: Mod: NTX | Performed by: STUDENT IN AN ORGANIZED HEALTH CARE EDUCATION/TRAINING PROGRAM

## 2020-01-28 PROCEDURE — 63600175 PHARM REV CODE 636 W HCPCS: Mod: NTX | Performed by: NURSE PRACTITIONER

## 2020-01-28 PROCEDURE — 85730 THROMBOPLASTIN TIME PARTIAL: CPT | Mod: NTX

## 2020-01-28 PROCEDURE — 63600175 PHARM REV CODE 636 W HCPCS: Mod: NTX | Performed by: HOSPITALIST

## 2020-01-28 PROCEDURE — 99231 SBSQ HOSP IP/OBS SF/LOW 25: CPT | Mod: NTX,,, | Performed by: NURSE PRACTITIONER

## 2020-01-28 PROCEDURE — 84134 ASSAY OF PREALBUMIN: CPT | Mod: NTX

## 2020-01-28 PROCEDURE — 82803 BLOOD GASES ANY COMBINATION: CPT | Mod: NTX

## 2020-01-28 PROCEDURE — 83050 HGB METHEMOGLOBIN QUAN: CPT | Mod: NTX

## 2020-01-28 PROCEDURE — 27000221 HC OXYGEN, UP TO 24 HOURS: Mod: NTX

## 2020-01-28 PROCEDURE — 80053 COMPREHEN METABOLIC PANEL: CPT | Mod: NTX

## 2020-01-28 PROCEDURE — 99291 PR CRITICAL CARE, E/M 30-74 MINUTES: ICD-10-PCS | Mod: NTX,,, | Performed by: NURSE PRACTITIONER

## 2020-01-28 PROCEDURE — 80500 PR  LAB PATHOLOGY CONSULT-LTD: ICD-10-PCS | Mod: ,,, | Performed by: PATHOLOGY

## 2020-01-28 PROCEDURE — 94761 N-INVAS EAR/PLS OXIMETRY MLT: CPT | Mod: NTX

## 2020-01-28 RX ORDER — FAMOTIDINE 40 MG/5ML
20 POWDER, FOR SUSPENSION ORAL DAILY
Status: DISCONTINUED | OUTPATIENT
Start: 2020-01-29 | End: 2020-01-31

## 2020-01-28 RX ADMIN — CETIRIZINE HYDROCHLORIDE 10 MG: 10 TABLET, FILM COATED ORAL at 08:01

## 2020-01-28 RX ADMIN — POTASSIUM CHLORIDE 40 MEQ: 1500 TABLET, EXTENDED RELEASE ORAL at 08:01

## 2020-01-28 RX ADMIN — FAMOTIDINE 40 MG: 40 POWDER, FOR SUSPENSION ORAL at 08:01

## 2020-01-28 RX ADMIN — STANDARDIZED SENNA CONCENTRATE AND DOCUSATE SODIUM 2 TABLET: 8.6; 5 TABLET ORAL at 08:01

## 2020-01-28 RX ADMIN — Medication 6 MG: at 10:01

## 2020-01-28 RX ADMIN — HYDRALAZINE HYDROCHLORIDE 50 MG: 50 TABLET ORAL at 05:01

## 2020-01-28 RX ADMIN — ISOSORBIDE DINITRATE 10 MG: 10 TABLET ORAL at 10:01

## 2020-01-28 RX ADMIN — POLYETHYLENE GLYCOL 3350 17 G: 17 POWDER, FOR SOLUTION ORAL at 08:01

## 2020-01-28 RX ADMIN — Medication 800 MG: at 08:01

## 2020-01-28 RX ADMIN — ISOSORBIDE DINITRATE 10 MG: 10 TABLET ORAL at 05:01

## 2020-01-28 RX ADMIN — HYDRALAZINE HYDROCHLORIDE 50 MG: 50 TABLET ORAL at 10:01

## 2020-01-28 RX ADMIN — ACETAMINOPHEN 1000 MG: 500 TABLET ORAL at 10:01

## 2020-01-28 RX ADMIN — ACETAMINOPHEN 1000 MG: 500 TABLET ORAL at 04:01

## 2020-01-28 RX ADMIN — EPINEPHRINE 0.04 MCG/KG/MIN: 1 INJECTION PARENTERAL at 06:01

## 2020-01-28 RX ADMIN — EPINEPHRINE 0.04 MCG/KG/MIN: 1 INJECTION PARENTERAL at 11:01

## 2020-01-28 RX ADMIN — TRIAMCINOLONE ACETONIDE: 5 CREAM TOPICAL at 10:01

## 2020-01-28 RX ADMIN — ATORVASTATIN CALCIUM 40 MG: 20 TABLET, FILM COATED ORAL at 08:01

## 2020-01-28 RX ADMIN — TRIAMCINOLONE ACETONIDE: 5 CREAM TOPICAL at 08:01

## 2020-01-28 RX ADMIN — CHLOROTHIAZIDE SODIUM 250 MG: 500 INJECTION, POWDER, LYOPHILIZED, FOR SOLUTION INTRAVENOUS at 05:01

## 2020-01-28 RX ADMIN — HYDRALAZINE HYDROCHLORIDE 50 MG: 50 TABLET ORAL at 02:01

## 2020-01-28 RX ADMIN — DOBUTAMINE IN DEXTROSE 5 MCG/KG/MIN: 200 INJECTION, SOLUTION INTRAVENOUS at 11:01

## 2020-01-28 RX ADMIN — ISOSORBIDE DINITRATE 10 MG: 10 TABLET ORAL at 02:01

## 2020-01-28 RX ADMIN — Medication 800 MG: at 10:01

## 2020-01-28 RX ADMIN — TRIAMCINOLONE ACETONIDE: 5 CREAM TOPICAL at 04:01

## 2020-01-28 NOTE — PROGRESS NOTES
"  [facility]  Adult Nutrition  Consult Note    SUMMARY     Recommendations    1. Consider adding Low sodium diet restrictions to current Diabetic diet order.   2. Continue Boost Glucose Control ONS, pt requesting alternate flavors.   3. RD to monitor & follow-up.    Goals: 1. Pt's intake meals >75% x 7 days.  Nutrition Goal Status: goal met  Communication of RD Recs: reviewed with physician    Reason for Assessment    Reason For Assessment: RD follow-up  Diagnosis: other (see comments)(LVAD/heart Transplant; cardiogenic shock)  Relevant Medical History: nonischemic CMP, chronic HF s/p ICD implant, tobacco and ETOH abuse  Interdisciplinary Rounds: did not attend    General Information Comments: Pt w/ varying PO intake, consumed 75% of breakfast this AM. Pt also drank 1 ONS for breakfast. NFPE complete 1/23 - pt meets criteria for moderate malnutrition. Pt w/ no further questions regarding low sodium diet.  Nutrition Discharge Planning: Adequate PO intake    Nutrition/Diet History    Patient Reported Diet/Restrictions/Preferences: general  Spiritual, Cultural Beliefs, Jehovah's witness Practices, Values that Affect Care: no  Factors Affecting Nutritional Intake: decreased appetite    Anthropometrics    Temp: 97.7 °F (36.5 °C)  Height Method: Estimated  Height: 5' 7" (170.2 cm)  Height (inches): 67 in  Weight Method: Standard Scale  Weight: 83 kg (182 lb 15.7 oz)  Weight (lb): 182.98 lb  Ideal Body Weight (IBW), Male: 148 lb  % Ideal Body Weight, Male (lb): 123.64 %  BMI (Calculated): 28.7  BMI Grade: 25 - 29.9 - overweight    Lab/Procedures/Meds    Pertinent Labs Reviewed: reviewed  Pertinent Labs Comments: Na 128, BUN 48, Creat 2.1, GFR 39.8, Gluc 236, A1C 6.6  Pertinent Medications Reviewed: reviewed  Pertinent Medications Comments: Dobutamine, Epi, Lasix, Heparin    Estimated/Assessed Needs    Weight Used For Calorie Calculations: 83 kg (182 lb 15.7 oz)  Energy Calorie Requirements (kcal): 2028 " kcal/d  Energy Need Method: Jefferson-St Jeor(PAL 1.25)     Protein Requirements: 100 g/d (1.2 g/kg)  Weight Used For Protein Calculations: 83 kg (182 lb 15.7 oz)     RDA Method (mL): 1 mL/kcal or per MD    Nutrition Prescription Ordered    Current Diet Order: 2000 kcal ADA  Nutrition Order Comments: 1000 mL FR  Oral Nutrition Supplement: Boost Glucose Control ONS    Evaluation of Received Nutrient/Fluid Intake    Comments: LBM: 1/27    Tolerance: tolerating    Nutrition Risk    Level of Risk/Frequency of Follow-up: (1x/week)     Assessment and Plan    Moderate malnutrition    Nutrition Problem:  Moderate Protein-Calorie Malnutrition  Malnutrition in the context of Chronic Illness/Injury     Related to (etiology):  Lack of appetite in setting of chronic heart failure.     Signs and Symptoms (as evidenced by):  Energy Intake: <75% of estimated energy requirement for 3+ months  Body Fat Depletion: mild to moderate depletion of orbitals, triceps   Muscle Mass Depletion: moderate depletion of hands, lower extremities        Interventions(treatment strategy):  Collaboration of care with providers.     Nutrition Diagnosis Status:  Continues    Monitor and Evaluation    Food and Nutrient Intake: energy intake, food and beverage intake  Food and Nutrient Adminstration: diet order  Knowledge/Beliefs/Attitudes: food and nutrition knowledge/skill  Anthropometric Measurements: weight, weight change, body mass index  Biochemical Data, Medical Tests and Procedures: electrolyte and renal panel, glucose/endocrine profile, gastrointestinal profile, inflammatory profile, lipid profile  Nutrition-Focused Physical Findings: overall appearance, extremities, muscles and bones, head and eyes, skin     Malnutrition Assessment    Energy Intake (Malnutrition): less than 75% for greater than or equal to 3 months   Orbital Region (Subcutaneous Fat Loss): mild depletion  Upper Arm Region (Subcutaneous Fat Loss): moderate depletion  Thoracic and  Lumbar Region: well nourished   Yazidi Region (Muscle Loss): well nourished  Clavicle Bone Region (Muscle Loss): well nourished  Clavicle and Acromion Bone Region (Muscle Loss): well nourished  Dorsal Hand (Muscle Loss): moderate depletion  Patellar Region (Muscle Loss): moderate depletion  Anterior Thigh Region (Muscle Loss): moderate depletion  Posterior Calf Region (Muscle Loss): moderate depletion     Nutrition Follow-Up    RD Follow-up?: Yes

## 2020-01-28 NOTE — SUBJECTIVE & OBJECTIVE
"Interval HPI:   Overnight events:   BG is now below goal after SQ novolog insulin correction.   Diet diabetic [facility];  Calorie; Fluid - 1000mL    Eatin% - 100%  Nausea: No  Hypoglycemia and intervention: No  Fever: No  TPN and/or TF: No  If yes, type of TF/TPN and rate: none    /66   Pulse (!) 111   Temp 97.7 °F (36.5 °C) (Oral)   Resp (!) 41   Ht 5' 7" (1.702 m)   Wt 83 kg (182 lb 15.7 oz)   SpO2 100%   BMI 28.66 kg/m²     Labs Reviewed and Include    Recent Labs   Lab 20  0325   *   CALCIUM 9.4   ALBUMIN 3.7   PROT 7.9   *   K 4.3   CO2 30*   CL 84*   BUN 48*   CREATININE 2.1*   ALKPHOS 90   ALT 25   AST 35   BILITOT 1.3*     Lab Results   Component Value Date    WBC 11.37 2020    HGB 10.8 (L) 2020    HCT 33.8 (L) 2020    MCV 83 2020     (H) 2020     Recent Labs   Lab 20  0400   TSH 2.179     Lab Results   Component Value Date    HGBA1C 6.6 (H) 2020       Nutritional status:   Body mass index is 28.66 kg/m².  Lab Results   Component Value Date    ALBUMIN 3.7 2020    ALBUMIN 3.9 2020    ALBUMIN 3.9 2020     Lab Results   Component Value Date    PREALBUMIN 23 2020    PREALBUMIN 15 (L) 2020    PREALBUMIN 17 (L) 2012       Estimated Creatinine Clearance: 41 mL/min (A) (based on SCr of 2.1 mg/dL (H)).    Accu-Checks  Recent Labs     20  1655 20  2110 20  0818 20  1057 20  1706 20  2059 20  0814 20  1110 20  1718 20  0804   POCTGLUCOSE 117* 135* 178* 140* 132* 313* 155* 381* 402* 123*       Current Medications and/or Treatments Impacting Glycemic Control  Immunotherapy:    Immunosuppressants     None        Steroids:   Hormones (From admission, onward)    Start     Stop Route Frequency Ordered    20  melatonin tablet 6 mg      -- Oral Nightly 20 1002        Pressors:    Autonomic Drugs (From admission, " onward)    Start     Stop Route Frequency Ordered    01/26/20 1745  EPINEPHrine (ADRENALIN) 5 mg in sodium chloride 0.9% 250 mL infusion      -- IV Continuous 01/26/20 1639        Hyperglycemia/Diabetes Medications:   Antihyperglycemics (From admission, onward)    Start     Stop Route Frequency Ordered    01/23/20 1019  insulin aspart U-100 pen 0-5 Units      -- SubQ Before meals & nightly PRN 01/23/20 0919

## 2020-01-28 NOTE — NURSING
CMICU DAILY GOALS       A: Awake    RASS: Goal - RASS Goal: 0-->alert and calm  Actual - RASS (Mcmillan Agitation-Sedation Scale): 0-->alert and calm   Restraint necessity: Clinical Justification: Removing medical devices  B: Breath   SBT: Not intubated   C: Coordinate A & B, analgesics/sedatives   Pain: managed    SAT: Not intubated  D: Delirium   CAM-ICU: Overall CAM-ICU: Negative  E: Early Mobility   MOVE Screen: Pass   Activity: Activity Management: activity adjusted per tolerance  FAS: Feeding/Nutrition   Diet order: Diet/Nutrition Received: low saturated fat/low cholesterol, 2 gram sodium, restrict fluids,   Fluid restriction: Fluid Requirement: 1000 cc FR  T: Thrombus   DVT prophylaxis: VTE Required Core Measure: Pharmacological prophylaxis initiated/maintained  H: HOB Elevation   Head of Bed (HOB): HOB at 30-45 degrees  U: Ulcer Prophylaxis   GI: yes  G: Glucose control   managed    S: Skin   Bundle compliance: yes   Bathing/Skin Care: bath, chlorhexidine, bath, complete, dressed/undressed, incontinence care, linen changed Date: 1/25  B: Bowel Function   no issues   I: Indwelling Catheters   Mc necessity:      Urethral Catheter 01/15/20 2030 Straight-tip 16 Fr.-Reason for Continuing Urinary Catheterization: Critically ill in ICU requiring intensive monitoring   CVC necessity: Yes   IPAD offered: Yes  D: De-escalation Antibx   Yes  Plan for the day   C Monday  Family/Goals of care/Code Status   Code Status: Full Code     No acute events throughout day, VS and assessment per flow sheet, patient progressing towards goals as tolerated, plan of care reviewed with Saba Lott and family, all concerns addressed, will continue to monitor.

## 2020-01-28 NOTE — PT/OT/SLP PROGRESS
Occupational Therapy   Treatment    Name: Saba Lott  MRN: [id number]  Admitting Diagnosis:  Cardiogenic shock  1 Day Post-Op    Recommendations:     Discharge Recommendations: home      Assessment:     Saba Lott is a 54 y.o. male with a medical diagnosis of Cardiogenic shock. . Performance deficits affecting function are weakness, impaired functional mobilty, impaired endurance, impaired balance, impaired self care skills, gait instability.   Pt tolerated session well with good effort and functional progress.     Rehab Prognosis:  Good; patient would benefit from acute skilled OT services to address these deficits and reach maximum level of function.       Plan:     Patient to be seen 3 x/week to address the above listed problems via self-care/home management, therapeutic activities, therapeutic exercises  · Plan of Care Expires:    · Plan of Care Reviewed with: patient    Subjective     Pain/Comfort:  · Pain Rating 1: 0/10    Objective:     Communicated with: nsg prior to session.  Pt found supine in bed     General Precautions: Standard, fall       Occupational Performance:     Bed Mobility:    Supine>sit with SBA     Functional Mobility/Transfers:  · Sit>stand with SBA  · Stand pivot bed to chair with SBA     Activities of Daily Living:  · Feeding: independent  · G/H: standing with supervision.    [facility] 6 Click ADL: 16    Treatment & Education:  Pt completed t/f skills without use of UE in prep for potential sternal precautions.   Pt demo good endurance/balance during standing g/h skills.  Education provided re: OT POC and safety with functional mobility/ADL skills.     Patient left seated in chair with needs in reach     GOALS:   Multidisciplinary Problems     Occupational Therapy Goals        Problem: Occupational Therapy Goal    Goal Priority Disciplines Outcome Interventions   Occupational Therapy Goal     OT, PT/OT Ongoing, Progressing    Description:  Goals to be met by: 7 days 1/30/20     Patient will  increase functional independence with ADLs by performing:    Pt to complete UE dressing with set-up  Pt to complete LE dressing with SBA  Pt to complete toileting with supervision.  Pt to complete standing g/h skills with supervision.  Pt to complete t/f to commode, bed and chair with SBA                        Time Tracking:     OT Date of Treatment: 01/28/20  OT Start Time: 0900  OT Stop Time: 0916  OT Total Time (min): 16 min    Billable Minutes:Self Care/Home Management 16    KARLY Castillo  1/28/2020

## 2020-01-28 NOTE — PT/OT/SLP PROGRESS
Physical Therapy Treatment    Patient Name:  Saba Lott   MRN:  [id number]    Recommendations:     Discharge Recommendations:  outpatient PT   Discharge Equipment Recommendations: none   Barriers to discharge: None    Assessment:     Saba Lott is a 54 y.o. male admitted with a medical diagnosis of Cardiogenic shock.  He presents with the following impairments/functional limitations:  gait instability, impaired endurance, impaired functional mobilty, impaired balance, decreased lower extremity function pt krista treatment well being able to gait train farther. Pt will benefit from cont skilled PT 4x/wk to progress physically. Pt should be able to discharge home with OPPT when medically stable.     Rehab Prognosis: Good; patient would benefit from acute skilled PT services to address these deficits and reach maximum level of function.    Recent Surgery: Procedure(s) (LRB):  INSERTION, CATHETER, RIGHT HEART (Right) 1 Day Post-Op    Plan:     During this hospitalization, patient to be seen 4 x/week to address the identified rehab impairments via gait training, therapeutic activities, therapeutic exercises and progress toward the following goals:    · Plan of Care Expires:  02/23/20    Subjective     Chief Complaint: pt had no complaints during treatment.   Patient/Family Comments/goals:  To get better and go home.   Pain/Comfort:  · Pain Rating 1: 0/10  · Pain Rating Post-Intervention 1: 0/10      Objective:     Communicated with nurse prior to session.  Patient found up in chair with telemetry, blood pressure cuff, PICC line, barton catheter, peripheral IV, oxygen(nitric oxide) upon PT entry to room.     General Precautions: Standard, fall   Orthopedic Precautions:N/A   Braces:       Functional Mobility:  · Transfers:  Pt needed verbal cues for functional mobility not using UE in preparation for possible surgery.    · Sit to Stand:  contact guard assistance with hand-held assist  ·   · Gait: pt received gait training ~  260 ft with HHA. RN present with portable monitor and respiratory therapist present with nitric oxide. RN followed with chair.       AM-PAC 6 CLICK MOBILITY  Turning over in bed (including adjusting bedclothes, sheets and blankets)?: 3  Sitting down on and standing up from a chair with arms (e.g., wheelchair, bedside commode, etc.): 3  Moving from lying on back to sitting on the side of the bed?: 3  Moving to and from a bed to a chair (including a wheelchair)?: 3  Need to walk in hospital room?: 3  Climbing 3-5 steps with a railing?: 3  Basic Mobility Total Score: 18         Patient left up in chair with all lines intact and call button in reach..    GOALS:   Multidisciplinary Problems     Physical Therapy Goals        Problem: Physical Therapy Goal    Goal Priority Disciplines Outcome Goal Variances Interventions   Physical Therapy Goal     PT, PT/OT Ongoing, Progressing     Description:  Goals to be met by: 2020     Patient will increase functional independence with mobility by performin. Supine to sit with Set-up [place]- met 2020  2. Sit to stand transfer with Supervision- not met  3. Gait  x 150 feet with Supervision using LRAD or no AD -not met  4. Lower extremity exercise program x10 reps per handout, with independence -not met                        Time Tracking:     PT Received On: 20  PT Start Time: 1005     PT Stop Time: 1024  PT Total Time (min): 19 min     Billable Minutes: Gait Training 19 min    Treatment Type: Treatment  PT/PTA: PT     PTA Visit Number: 0     Christa Malik, PT  2020

## 2020-01-28 NOTE — PROGRESS NOTES
Pt AAAO, no family at bedside. Pt had VAD education forms to work on over the weekend.  He had not completed them and said he will try to get started, but he may need to have his family help him with these.  No questions for me at this time.

## 2020-01-28 NOTE — PLAN OF CARE
Pt progressing toward stated goals.   Problem: Occupational Therapy Goal  Goal: Occupational Therapy Goal  Description  Goals to be met by: 7 days 1/30/20     Patient will increase functional independence with ADLs by performing:    Pt to complete UE dressing with set-up  Pt to complete LE dressing with SBA  Pt to complete toileting with supervision.  Pt to complete standing g/h skills with supervision.  Pt to complete t/f to commode, bed and chair with SBA       Outcome: Ongoing, Progressing

## 2020-01-28 NOTE — NURSING
Notified Mallika Lugo NP that pt UO decreased to 20-25 cc/hr for the past four hours. CVP from SEGUNDO PICC increased to 28 from 21. Pt Denies SOB, N/V or any other pain or distress and all VS stable. Brittney verbalizes understanding, states that CVP likely inaccurate due to major discrepancy between PICC CVP and CVP with yesterdays RHC. No new orders at this time, will continue to monitor.

## 2020-01-28 NOTE — PLAN OF CARE
CMICU DAILY GOALS       A: Awake    RASS: Goal - RASS Goal: 0-->alert and calm  Actual - RASS ([place]ill[place] Agitation-Sedation Scale): 0-->alert and calm   Restraint necessity: Clinical Justification: Removing medical devices  B: Breath   SBT: Not intubated   C: Coordinate A & B, analgesics/sedatives   Pain: managed    SAT: Not intubated  D: Delirium   CAM-ICU: Overall CAM-ICU: Negative  E: Early Mobility   MOVE Screen: Pass   Activity: Activity Management: up in chair - L3  FAS: Feeding/Nutrition   Diet order: Diet/Nutrition Received: low saturated fat/low cholesterol, 2 gram sodium, restrict fluids,   Fluid restriction: Fluid Requirement: 1000 CC FR  T: Thrombus   DVT prophylaxis: VTE Required Core Measure: Pharmacological prophylaxis initiated/maintained  H: HOB Elevation   Head of Bed (HOB): HOB at 60-90 degrees  U: Ulcer Prophylaxis   GI: yes  G: Glucose control   managed    S: Skin   Bundle compliance: yes   Bathing/Skin Care: bath, chlorhexidine, bath, complete, dressed/undressed, incontinence care, linen changed Date: 01/27am  B: Bowel Function   no issues   I: Indwelling Catheters   Mc necessity:      Urethral Catheter 01/15/20 2030 Straight-tip 16 Fr.-Reason for Continuing Urinary Catheterization: Critically ill in ICU requiring intensive monitoring   CVC necessity: Yes   IPAD offered: Yes  D: De-escalation Antibx   No  Plan for the day   Diuresis continued, CVP actively monitored q4, pt is s/p RHC, epi, dobutamine, heparin, lasix continuous infusions. 4L NO2 continued at 20ppm  Family/Goals of care/Code Status   Code Status: Full Code     No acute events throughout day, VS and assessment per flow sheet, patient progressing towards goals as tolerated, plan of care reviewed with Saba Lott and family, all concerns addressed, will continue to monitor.

## 2020-01-28 NOTE — PLAN OF CARE
Problem: Physical Therapy Goal  Goal: Physical Therapy Goal  Description  Goals to be met by: 2020     Patient will increase functional independence with mobility by performin. Supine to sit with Set-up [place]- met 2020  2. Sit to stand transfer with Supervision- not met  3. Gait  x 150 feet with Supervision using LRAD or no AD -not met  4. Lower extremity exercise program x10 reps per handout, with independence -not met       Outcome: Ongoing, Progressing   Goals remain appropriate. Christa Malik, PT  2020

## 2020-01-28 NOTE — ASSESSMENT & PLAN NOTE
-Creatinine 2.1 today. Was 3.0 on admit and got as high as 4.3 at OSH prior to transfer. Renal function was normal 8 months ago. Hopefully creatinine will come down with decrease in Lasix and stopping Diuril today  -Cont to monitor with diuresis

## 2020-01-28 NOTE — ASSESSMENT & PLAN NOTE
-NICM; Likely Etoh induced  -Transferred from [facility] with IABP at 1:1 for further level of care. IABP removed 1/25  -Last 2D Echo 1/23/20: LVEF 20%, LVEDD 6.46 cm, RV mod dilated and mild to mod depressed, IVC 15  -RHC 1/27 on  5, Epi 0.04, Radha 20 ppm and Lasix 40 mg/hr: RA 12 (not correlating with CVP of 20 by PICC same day), PAP 38/22 (30), W 20 and CO/CI 4.11/2.06.   -Net -ve only 160 cc, but weight is down 12# since 1/25 (and down 79# since admit). Decreased Lasix to 20 mg/hr and D/C'd IV Diuril  -GDMT: Continue Hyd/Isordil       - On step 4 pathway for VAD/OHT. Speech cleared him from cognitive assessment stand point but nurses have concern abt his cognition        -Plan to present at [facility] this Wednesday.

## 2020-01-28 NOTE — PROGRESS NOTES
"[facility]  Endocrinology  Progress Note    Admit Date: 1/15/2020     Reason for Consult: Management of  Hyperglycemia     Surgical Procedure and Date:    ICD implantation 02/15/2019      HPI:   Patient is a 54 y.o. 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 Chronic MARC - Class II-III and mild LE edema.  Admitted to [facility] on Thursday due to severe SOB for a week with associated orthopnea, MARC, and PND unable to lie flat and found to be in acute diastolic heart failure.He was started on CRRT for a few days and tolerated well. Renal u/s was negative for obstruction and liver u/s without findings of cirrhosis. All of this was progression of cardiorenal syndrome with liver congestion from severe volume overload. No dx of DM noted on file. Patient denies history of DM at time of consult. Endocrinology consulted to manage BG during admission to [facility].          Lab Results   Component Value Date    HGBA1C 6.6 (H) 2020       Interval HPI:   Overnight events:   BG is now below goal after SQ novolog insulin correction.   Diet diabetic [facility]; 2000 Calorie; Fluid - 1000mL    Eatin% - 100%  Nausea: No  Hypoglycemia and intervention: No  Fever: No  TPN and/or TF: No  If yes, type of TF/TPN and rate: none    /66   Pulse (!) 111   Temp 97.7 °F (36.5 °C) (Oral)   Resp (!) 41   Ht 5' 7" (1.702 m)   Wt 83 kg (182 lb 15.7 oz)   SpO2 100%   BMI 28.66 kg/m²      Labs Reviewed and Include    Recent Labs   Lab 20  0325   *   CALCIUM 9.4   ALBUMIN 3.7   PROT 7.9   *   K 4.3   CO2 30*   CL 84*   BUN 48*   CREATININE 2.1*   ALKPHOS 90   ALT 25   AST 35   BILITOT 1.3*     Lab Results   Component Value Date    WBC 11.37 2020    HGB 10.8 (L) 2020    HCT 33.8 (L) 2020    MCV " 83 01/28/2020     (H) 01/28/2020     Recent Labs   Lab 01/23/20  0400   TSH 2.179     Lab Results   Component Value Date    HGBA1C 6.6 (H) 01/16/2020       Nutritional status:   Body mass index is 28.66 kg/m².  Lab Results   Component Value Date    ALBUMIN 3.7 01/28/2020    ALBUMIN 3.9 01/27/2020    ALBUMIN 3.9 01/26/2020     Lab Results   Component Value Date    PREALBUMIN 23 01/28/2020    PREALBUMIN 15 (L) 01/22/2020    PREALBUMIN 17 (L) 07/23/2012       Estimated Creatinine Clearance: 41 mL/min (A) (based on SCr of 2.1 mg/dL (H)).    Accu-Checks  Recent Labs     01/25/20  1655 01/25/20  2110 01/26/20  0818 01/26/20  1057 01/26/20  1706 01/26/20  2059 01/27/20  0814 01/27/20  1110 01/27/20  1718 01/28/20  0804   POCTGLUCOSE 117* 135* 178* 140* 132* 313* 155* 381* 402* 123*       Current Medications and/or Treatments Impacting Glycemic Control  Immunotherapy:    Immunosuppressants     None        Steroids:   Hormones (From admission, onward)    Start     Stop Route Frequency Ordered    01/17/20 2100  melatonin tablet 6 mg      -- Oral Nightly 01/17/20 1002        Pressors:    Autonomic Drugs (From admission, onward)    Start     Stop Route Frequency Ordered    01/26/20 1745  EPINEPHrine (ADRENALIN) 5 mg in sodium chloride 0.9% 250 mL infusion      -- IV Continuous 01/26/20 1639        Hyperglycemia/Diabetes Medications:   Antihyperglycemics (From admission, onward)    Start     Stop Route Frequency Ordered    01/23/20 1019  insulin aspart U-100 pen 0-5 Units      -- SubQ Before meals & nightly PRN 01/23/20 0919          ASSESSMENT and PLAN    * Cardiogenic shock  Managed per primary team  Avoid hypoglycemia        Acute hyperglycemia  BG goal 140 - 180     BG is reasonably controlled with minimal to no use of insulin therapy.   Low Dose SQ Insulin Correction Scale PRN BG excursions.   BG Monitoring AC/HS     ** Please call Endocrine for any BG related issues **  ** Please notify Endocrine for any change  and/or advance in diet**    Discharge Planning:    TBD. Please notify endocrinology prior to discharge.         ANJELICA (acute kidney injury)  Titrate insulin slowly to avoid hypoglycemia as the risk of hypoglycemia increases with decreased creatinine clearance.              Saad Mayo NP  Endocrinology  [facility]

## 2020-01-28 NOTE — PROGRESS NOTES
"[facility] TRANSFUSION MEDICINE  Section of Transfusion Medicine and Histocompatibility  HLA Note    Case Details   Diagnosis:  No primary diagnosis found.  Blood Type: O POS  HLA Type:   Class I:  No results found for: HFIE5II, JOYX2GY, ICLA4YX, QWJL9YR, ISRMJ1MF, DQHGA2XL, YGEMG0BX, GLZFF9QC  Class II:  No results found for: IPYAJV83RX, RANLTT72MK, EZNMZO961DA, FQDJDP2738, IVLOC0UL, RISUA8NZ  Recent Antibody Screen/ID Results:   No results found for: XM5DBXP, CIABCLM, CIIAB, ABCMT  Auto T Cell Crossmatch Results:  No results found for: XMTCELLRES  Auto B Cell Crossmatch Results:  No results found for: BCELLRES  Assessment     Interpretation: A weak B38 antibody (MFI 2275) is detected. A virtual crossmatch is recommended.    Strongly Recommended Unacceptable Antigens:  B38 (this is probably not a "real" antibody but it will not significantly affect his chance of transplant if listed, cPRA 3%).    Crossmatch Expectations: (Given strongly recommended unacceptable antigens) A retrospective or prospective flow cytometric crossmatch is expected to be negative.     Please call the HLA Lab [phone] with any concerns or questions.    JAMA Rosales MD, DAYNE  Section of Transfusion Medicine & Histocompatibility  Department of Pathology and Laboratory Medicine  [facility]  01/28/2020            "

## 2020-01-28 NOTE — SUBJECTIVE & OBJECTIVE
**Interval History: Net -ve only 160 cc past 24 hours, but weight is down 12# since 1/25 (69# since admit). CVP via PICC line is not accurate (did not correlate with RAP of 12 on RHC yesterday). Lasix gtt decreased to 20 mg/hr, and IV Diuril D/C'd. Feels generally well. Walked 260 feet in hallway without having to stop and rest, and without AD    Continuous Infusions:   DOBUTamine 5 mcg/kg/min (01/28/20 1100)    epinephrine infustion (NON-TITRATING) 0.04 mcg/kg/min (01/28/20 0900)    furosemide (LASIX) 10 mg/mL infusion (non-titrating) 20 mg/hr (01/28/20 1100)    heparin (porcine) in D5W 15 Units/kg/hr (01/28/20 1100)    nitric oxide gas       Scheduled Meds:   acetaminophen  1,000 mg Oral Q8H    atorvastatin  40 mg Oral Daily    cetirizine  10 mg Oral Daily    [START ON 1/29/2020] famotidine  20 mg Oral Daily    hydrALAZINE  50 mg Oral Q8H    isosorbide dinitrate  10 mg Oral Q8H    magnesium oxide  800 mg Oral BID    magnesium sulfate IVPB  1 g Intravenous Once    melatonin  6 mg Oral Nightly    polyethylene glycol  17 g Oral Daily    senna-docusate 8.6-50 mg  2 tablet Oral BID    triamcinolone acetonide 0.5%   Topical (Top) TID     PRN Meds:albuterol, bisacodyl, Dextrose 10% Bolus, Dextrose 10% Bolus, glucagon (human recombinant), glucose, glucose, heparin (PORCINE), heparin (PORCINE), hydrOXYzine HCl, insulin aspart U-100, sodium chloride 0.9%, traMADol    Review of patient's allergies indicates:   Allergen Reactions    Iodine and iodide containing products      Objective:     Vital Signs (Most Recent):  Temp: 97.7 °F (36.5 °C) (01/28/20 0700)  Pulse: 108 (01/28/20 1100)  Resp: (!) 29 (01/28/20 1100)  BP: (!) 84/56 (01/28/20 1100)  SpO2: 100 % (01/28/20 1100) Vital Signs (24h Range):  Temp:  [97.7 °F (36.5 °C)-98.7 °F (37.1 °C)] 97.7 °F (36.5 °C)  Pulse:  [] 108  Resp:  [13-41] 29  SpO2:  [90 %-100 %] 100 %  BP: ()/(55-83) 84/56     Patient Vitals for the past 72 hrs (Last 3  readings):   Weight   01/28/20 1022 83 kg (182 lb 15.7 oz)   01/28/20 0806 83 kg (182 lb 15.7 oz)     Body mass index is 28.66 kg/m².      Intake/Output Summary (Last 24 hours) at 1/28/2020 1128  Last data filed at 1/28/2020 1100  Gross per 24 hour   Intake 2283.08 ml   Output 2180 ml   Net 103.08 ml       Hemodynamic Parameters:       Telemetry: ST    Physical Exam   Constitutional: He is oriented to person, place, and time. He appears well-developed and well-nourished.   HENT:   Head: Normocephalic and atraumatic.   Eyes: Pupils are equal, round, and reactive to light. Conjunctivae and EOM are normal.   Neck: Normal range of motion. Neck supple. No JVD present. No thyromegaly present.   Cardiovascular: Regular rhythm.   Tachycardic, + S3. Grade II/VI systolic murmur LSB   Pulmonary/Chest: Effort normal and breath sounds normal.   Abdominal: Soft. Bowel sounds are normal.   Musculoskeletal: Normal range of motion. He exhibits no edema.   Neurological: He is alert and oriented to person, place, and time.   Skin: Skin is warm and dry. Capillary refill takes 2 to 3 seconds.   Psychiatric: He has a normal mood and affect. His behavior is normal. Judgment and thought content normal.       Significant Labs:  CBC:  Recent Labs   Lab 01/26/20  0352 01/27/20  0409 01/28/20  0325   WBC 10.27 11.99 11.37   RBC 4.48* 4.35* 4.06*   HGB 11.9* 11.6* 10.8*   HCT 37.5* 36.2* 33.8*    409* 414*   MCV 84 83 83   MCH 26.6* 26.7* 26.6*   MCHC 31.7* 32.0 32.0     BNP:  Recent Labs   Lab 01/22/20  1752 01/27/20  1420   BNP 2,342* 3,095*     CMP:  Recent Labs   Lab 01/26/20  0352  01/27/20  0410 01/27/20  1403 01/28/20  0325   *   < > 245* 244* 236*   CALCIUM 10.2   < > 9.8 9.4 9.4   ALBUMIN 3.9  --  3.9  --  3.7   PROT 8.6*  --  8.4  --  7.9   *   < > 130* 127* 128*   K 4.5   < > 4.1 4.6 4.3   CO2 32*   < > 32* 30* 30*   CL 86*   < > 84* 84* 84*   BUN 36*   < > 41* 44* 48*   CREATININE 1.9*   < > 2.0* 2.1* 2.1*    ALKPHOS 99  --  97  --  90   ALT 27  --  27  --  25   AST 38  --  31  --  35   BILITOT 1.2*  --  1.3*  --  1.3*    < > = values in this interval not displayed.      Coagulation:   Recent Labs   Lab 01/23/20  0400  01/27/20  1720 01/27/20  2318 01/28/20  0325   INR 1.4*  --   --   --   --    APTT 47.2*   < > 48.4* 46.5* 43.7*    < > = values in this interval not displayed.     LDH:  No results for input(s): LDH in the last 72 hours.  Microbiology:  Microbiology Results (last 7 days)     ** No results found for the last 168 hours. **          I have reviewed all pertinent labs within the past 24 hours.    Estimated Creatinine Clearance: 41 mL/min (A) (based on SCr of 2.1 mg/dL (H)).    Diagnostic Results:  I have reviewed all pertinent imaging results/findings within the past 24 hours.

## 2020-01-28 NOTE — ASSESSMENT & PLAN NOTE
Nutrition Problem:  Moderate Protein-Calorie Malnutrition  Malnutrition in the context of Chronic Illness/Injury     Related to (etiology):  Lack of appetite in setting of chronic heart failure     Signs and Symptoms (as evidenced by):  Energy Intake: <75% of estimated energy requirement for > 3 months  Body Fat Depletion: mild and moderate depletion of orbitals and triceps   Muscle Mass Depletion: moderate depletion of interosseous muscle and lower extremities   Weight Loss: -24.2% x 1 month (mostly fluid related loss)   Fluid Accumulation: mild      Interventions(treatment strategy):  Collaboration of care with providers.     Nutrition Diagnosis Status:  Continues

## 2020-01-28 NOTE — PROGRESS NOTES
[facility]  Heart Transplant  Progress Note    Patient Name: Saba Lott  MRN: [id number]  Admission Date: 1/15/2020  Hospital Length of Stay: 13 days  Attending Physician: Lian Daniel MD  Primary Care Provider: Primary Doctor No  Principal Problem:Cardiogenic shock    Subjective:     **Interval History: Net -ve only 160 cc past 24 hours, but weight is down 12# since 1/25 (69# since admit). CVP via PICC line is not accurate (did not correlate with RAP of 12 on RHC yesterday). Lasix gtt decreased to 20 mg/hr, and IV Diuril D/C'd. Feels generally well. Walked 260 feet in hallway without having to stop and rest, and without AD    Continuous Infusions:   DOBUTamine 5 mcg/kg/min (01/28/20 1100)    epinephrine infustion (NON-TITRATING) 0.04 mcg/kg/min (01/28/20 0900)    furosemide (LASIX) 10 mg/mL infusion (non-titrating) 20 mg/hr (01/28/20 1100)    heparin (porcine) in D5W 15 Units/kg/hr (01/28/20 1100)    nitric oxide gas       Scheduled Meds:   acetaminophen  1,000 mg Oral Q8H    atorvastatin  40 mg Oral Daily    cetirizine  10 mg Oral Daily    [START ON 1/29/2020] famotidine  20 mg Oral Daily    hydrALAZINE  50 mg Oral Q8H    isosorbide dinitrate  10 mg Oral Q8H    magnesium oxide  800 mg Oral BID    magnesium sulfate IVPB  1 g Intravenous Once    melatonin  6 mg Oral Nightly    polyethylene glycol  17 g Oral Daily    senna-docusate 8.6-50 mg  2 tablet Oral BID    triamcinolone acetonide 0.5%   Topical (Top) TID     PRN Meds:albuterol, bisacodyl, Dextrose 10% Bolus, Dextrose 10% Bolus, glucagon (human recombinant), glucose, glucose, heparin (PORCINE), heparin (PORCINE), hydrOXYzine HCl, insulin aspart U-100, sodium chloride 0.9%, traMADol    Review of patient's allergies indicates:   Allergen Reactions    Iodine and iodide containing products      Objective:     Vital Signs (Most Recent):  Temp: 97.7 °F (36.5 °C) (01/28/20 0700)  Pulse: 108 (01/28/20 1100)  Resp: (!) 29  (01/28/20 1100)  BP: (!) 84/56 (01/28/20 1100)  SpO2: 100 % (01/28/20 1100) Vital Signs (24h Range):  Temp:  [97.7 °F (36.5 °C)-98.7 °F (37.1 °C)] 97.7 °F (36.5 °C)  Pulse:  [] 108  Resp:  [13-41] 29  SpO2:  [90 %-100 %] 100 %  BP: ()/(55-83) 84/56     Patient Vitals for the past 72 hrs (Last 3 readings):   Weight   01/28/20 1022 83 kg (182 lb 15.7 oz)   01/28/20 0806 83 kg (182 lb 15.7 oz)     Body mass index is 28.66 kg/m².      Intake/Output Summary (Last 24 hours) at 1/28/2020 1128  Last data filed at 1/28/2020 1100  Gross per 24 hour   Intake 2283.08 ml   Output 2180 ml   Net 103.08 ml       Hemodynamic Parameters:       Telemetry: ST    Physical Exam   Constitutional: He is oriented to person, place, and time. He appears well-developed and well-nourished.   HENT:   Head: Normocephalic and atraumatic.   Eyes: Pupils are equal, round, and reactive to light. Conjunctivae and EOM are normal.   Neck: Normal range of motion. Neck supple. No JVD present. No thyromegaly present.   Cardiovascular: Regular rhythm.   Tachycardic, + S3. Grade II/VI systolic murmur LSB   Pulmonary/Chest: Effort normal and breath sounds normal.   Abdominal: Soft. Bowel sounds are normal.   Musculoskeletal: Normal range of motion. He exhibits no edema.   Neurological: He is alert and oriented to person, place, and time.   Skin: Skin is warm and dry. Capillary refill takes 2 to 3 seconds.   Psychiatric: He has a normal mood and affect. His behavior is normal. Judgment and thought content normal.       Significant Labs:  CBC:  Recent Labs   Lab 01/26/20  0352 01/27/20  0409 01/28/20  0325   WBC 10.27 11.99 11.37   RBC 4.48* 4.35* 4.06*   HGB 11.9* 11.6* 10.8*   HCT 37.5* 36.2* 33.8*    409* 414*   MCV 84 83 83   MCH 26.6* 26.7* 26.6*   MCHC 31.7* 32.0 32.0     BNP:  Recent Labs   Lab 01/22/20  1752 01/27/20  1420   BNP 2,342* 3,095*     CMP:  Recent Labs   Lab 01/26/20  0352  01/27/20  0410 01/27/20  1403 01/28/20  6935    *   < > 245* 244* 236*   CALCIUM 10.2   < > 9.8 9.4 9.4   ALBUMIN 3.9  --  3.9  --  3.7   PROT 8.6*  --  8.4  --  7.9   *   < > 130* 127* 128*   K 4.5   < > 4.1 4.6 4.3   CO2 32*   < > 32* 30* 30*   CL 86*   < > 84* 84* 84*   BUN 36*   < > 41* 44* 48*   CREATININE 1.9*   < > 2.0* 2.1* 2.1*   ALKPHOS 99  --  97  --  90   ALT 27  --  27  --  25   AST 38  --  31  --  35   BILITOT 1.2*  --  1.3*  --  1.3*    < > = values in this interval not displayed.      Coagulation:   Recent Labs   Lab 01/23/20  0400  01/27/20  1720 01/27/20  2318 01/28/20  0325   INR 1.4*  --   --   --   --    APTT 47.2*   < > 48.4* 46.5* 43.7*    < > = values in this interval not displayed.     LDH:  No results for input(s): LDH in the last 72 hours.  Microbiology:  Microbiology Results (last 7 days)     ** No results found for the last 168 hours. **          I have reviewed all pertinent labs within the past 24 hours.    Estimated Creatinine Clearance: 41 mL/min (A) (based on SCr of 2.1 mg/dL (H)).    Diagnostic Results:  I have reviewed all pertinent imaging results/findings within the past 24 hours.    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 Chronic MARC - Class II-III and mild LE edema.      Hospital Course (synopsis of major diagnoses, care, treatment, and services provided during the course of the hospital stay):  -2/12 admit to CDU for diuresis with IV lasix  -IV abx   -CXR with suspected small left pleural effusion with associated left basilar atelectasis versus evolving airspace disease  -2/13 single chamber ICD implant; IV lasix decreased to BID  -2/16 add dobutamine, hold BB due to hypotension, no ACE-I or ARB due to recent HPI hypotension  -2/17 Lasix changed to PO  -2/18 dobutamine discontinued    Admitted to [facility] on Thursday due to severe SOB for a week with associated orthopnea, MARC, and PND unable to lie flat and found to be in acute diastolic heart failure. States he normally weighs around 219 but up to 254lb on admission tonight. Says he hasn't been the most compliant in terms of fluid restriction and daily weights but has avoided salt. BNP on admission was 2375. BUN/CRT 32/1.4 He was started on IV diuretics but continued to deteriorate. Creatinine continued to increase and reached 4.3 with oliguria. He was started on CRRT for a few days and tolerated well. Renal u/s was negative for obstruction and liver u/s without findings of cirrhosis. All of this was progression of cardiorenal syndrome with liver congestion from severe volume overload. On arrival vitals stable and in no acute distress. He has obvious anasarca up to the chest. He did have uop of 500 at time of arrival also.    TTE 5/28/19  · Moderate left ventricular enlargement.  · Severely decreased left ventricular systolic function. The estimated ejection fraction is 20%  · Global hypokinetic wall motion.  · Grade III (severe) left ventricular diastolic dysfunction consistent with restrictive physiology.  · Severe left atrial enlargement.  · Moderate right ventricular enlargement.  · Low normal right ventricular systolic function.  · Mild right atrial enlargement.  · Moderate mitral regurgitation.  Mild to moderate tricuspid regurgitation    * Cardiogenic shock  -NICM; Likely Etoh induced  -Transferred from [facility] with IABP at 1:1 for further level of care. IABP removed 1/25  -Last 2D Echo 1/23/20: LVEF 20%, LVEDD 6.46 cm, RV mod dilated and mild to mod depressed, IVC 15  -RHC 1/27 on  5, Epi 0.04, Radha 20 ppm and Lasix 40 mg/hr: RA 12 (not correlating with CVP of 20 by PICC same day), PAP 38/22 (30), W 20 and CO/CI 4.11/2.06.   -Net -ve only 160 cc, but weight is down 12# since 1/25 (and down 79# since admit). Decreased Lasix to 20 mg/hr  and D/C'd IV Diuril  -GDMT: Continue Hyd/Isordil       - On step 4 pathway for VAD/OHT. Speech cleared him from cognitive assessment stand point but nurses have concern abt his cognition        -Plan to present at [facility] this Wednesday.         Acute hyperglycemia  -HgA1C 6.6  -Endocrine following    Moderate malnutrition  - Boost glucose control added 1/27   - Prealbumin is 23    Morbid obesity  -Weight down 79# since admit with diuresis, making BMI now 29.4    ANJELICA (acute kidney injury)  -Creatinine 2.1 today. Was 3.0 on admit and got as high as 4.3 at OSH prior to transfer. Renal function was normal 8 months ago. Hopefully creatinine will come down with decrease in Lasix and stopping Diuril today  -Cont to monitor with diuresis     Essential hypertension  -Currently essentially normotensive   -Continue Hyd/Isordil    Deep vein thrombosis (DVT) of right lower extremity  -Unsure of timing but was on eliquis PTA.   -Continue on heparin gtt for now      AICD (automatic cardioverter/defibrillator) present  -Medtronic single chamber ICD placed 2019 for primary prevention      Uninterrupted Critical Care/Counseling Time (not including procedures): 30 [place] [id number]  Heart Transplant  [facility]

## 2020-01-28 NOTE — PHYSICIAN QUERY
PT Name: Saba Lott  MR #: [id number]    Physician Query Form - CardioPulmonary Clarification      CDS/: Tisha Ferguson RN, CCDS              Contact information: [email or web address]    This form is a permanent document in the medical record.    Query Date: January 28, 2020    By submitting this query, we are merely seeking further clarification of documentation. Please utilize your independent clinical judgment when addressing the question(s) below.    The Medical record contains the following:   Indicators   Supporting Clinical Findings Location in Medical Record   X Pulmonary Hypertension documented · Filling pressures on the right and left are moderately elevated. Pulmonary HTN is moderate. 1/27 RHC   X Acute/Chronic Illness Acute on chronic combined systolic and diastolic HF    Cardiogenic shock- remains supported with epi,  Radha and lasix gtt 1/17 MD query      1/27 prog note   X Echo and/or Heart Cath Findings · Severely decreased left ventricular systolic function. The estimated ejection fraction is 20%.  · Severe left ventricular enlargement.  · Global hypokinetic wall motion.  · Left ventricular diastolic dysfunction.  · Mild right ventricular enlargement.  · Mildly to moderately reduced right ventricular systolic function.  · Moderate-to-severe mitral regurgitation.  · Moderate tricuspid regurgitation.  · The estimated PA systolic pressure is 39 mmHg.  · Elevated central venous pressure (15 mmHg).  · Mild biatrial enlargement. 1/16 echo    BiPAP/Intubation/Supplemental O2      SOB, MARC, Fatigue, Dizziness, LE Edema, Cyanosis, Chest Pain, Respiratory Distress, Hypoxia, etc.      Treatment         Medication      Other     Provider, please specify the type of pulmonary hypertension:  [   ] Group 1:  Pulmonary Arterial Hypertension - includes Primary, Idiopathic, Inheritable, and Secondary (due to drugs, toxins, congenital heart diease, HIV infection, etc.)     [ x  ] Group 2:  Pulmonary  Hypertension due to Left Heart Disease, including left heart failure and/or left heart valve disease     [   ] Group 5:  Pulmonary Hypertension due to other, multifactorial, or unclear mechanisms     [   ] Pulmonary Hypertension, unspecified     [   ] Other Cardiopulmonary Condition (please specify):     [  ] Clinically Undetermined         Please document in your progress notes daily for the duration of treatment, until resolved, and include in your discharge summary.

## 2020-01-29 ENCOUNTER — CLINICAL SUPPORT (OUTPATIENT)
Dept: CARDIOLOGY | Facility: CLINIC | Age: 55
DRG: 001 | End: 2020-01-29
Attending: INTERNAL MEDICINE
Payer: MEDICARE

## 2020-01-29 ENCOUNTER — DOCUMENTATION ONLY (OUTPATIENT)
Dept: PHARMACY | Facility: HOSPITAL | Age: 55
End: 2020-01-29

## 2020-01-29 ENCOUNTER — COMMITTEE REVIEW (OUTPATIENT)
Dept: TRANSPLANT | Facility: CLINIC | Age: 55
End: 2020-01-29

## 2020-01-29 LAB
ALBUMIN SERPL BCP-MCNC: 3.6 G/DL (ref 3.5–5.2)
ALBUMIN SERPL BCP-MCNC: 3.6 G/DL (ref 3.5–5.2)
ALLENS TEST: ABNORMAL
ALLENS TEST: ABNORMAL
ALP SERPL-CCNC: 90 U/L (ref 55–135)
ALP SERPL-CCNC: 95 U/L (ref 55–135)
ALT SERPL W/O P-5'-P-CCNC: 24 U/L (ref 10–44)
ALT SERPL W/O P-5'-P-CCNC: 26 U/L (ref 10–44)
ANION GAP SERPL CALC-SCNC: 12 MMOL/L (ref 8–16)
ANION GAP SERPL CALC-SCNC: 9 MMOL/L (ref 8–16)
APTT BLDCRRT: 46.2 SEC (ref 21–32)
AST SERPL-CCNC: 31 U/L (ref 10–40)
AST SERPL-CCNC: 32 U/L (ref 10–40)
BASOPHILS # BLD AUTO: 0.06 K/UL (ref 0–0.2)
BASOPHILS NFR BLD: 0.5 % (ref 0–1.9)
BILIRUB SERPL-MCNC: 1 MG/DL (ref 0.1–1)
BILIRUB SERPL-MCNC: 1.2 MG/DL (ref 0.1–1)
BUN SERPL-MCNC: 49 MG/DL (ref 6–20)
BUN SERPL-MCNC: 50 MG/DL (ref 6–20)
CALCIUM SERPL-MCNC: 9.4 MG/DL (ref 8.7–10.5)
CALCIUM SERPL-MCNC: 9.8 MG/DL (ref 8.7–10.5)
CHLORIDE SERPL-SCNC: 86 MMOL/L (ref 95–110)
CHLORIDE SERPL-SCNC: 86 MMOL/L (ref 95–110)
CO2 SERPL-SCNC: 32 MMOL/L (ref 23–29)
CO2 SERPL-SCNC: 33 MMOL/L (ref 23–29)
CREAT SERPL-MCNC: 2.1 MG/DL (ref 0.5–1.4)
CREAT SERPL-MCNC: 2.2 MG/DL (ref 0.5–1.4)
DELSYS: ABNORMAL
DELSYS: ABNORMAL
DIFFERENTIAL METHOD: ABNORMAL
EOSINOPHIL # BLD AUTO: 0.1 K/UL (ref 0–0.5)
EOSINOPHIL NFR BLD: 0.9 % (ref 0–8)
ERYTHROCYTE [DISTWIDTH] IN BLOOD BY AUTOMATED COUNT: 18 % (ref 11.5–14.5)
EST. GFR  (AFRICAN AMERICAN): 37.6 ML/MIN/1.73 M^2
EST. GFR  (AFRICAN AMERICAN): 39.8 ML/MIN/1.73 M^2
EST. GFR  (NON AFRICAN AMERICAN): 32.5 ML/MIN/1.73 M^2
EST. GFR  (NON AFRICAN AMERICAN): 34.4 ML/MIN/1.73 M^2
FLOW: 4
FLOW: 4
GLUCOSE SERPL-MCNC: 163 MG/DL (ref 70–110)
GLUCOSE SERPL-MCNC: 209 MG/DL (ref 70–110)
HCO3 UR-SCNC: 37.1 MMOL/L (ref 24–28)
HCO3 UR-SCNC: 38.1 MMOL/L (ref 24–28)
HCT VFR BLD AUTO: 34.5 % (ref 40–54)
HGB BLD-MCNC: 11.1 G/DL (ref 14–18)
HLA DRB4 1: NORMAL
HLA SSO DNA TYPING CLASS I & II INTERPRETATION: NORMAL
HLA-A 1 SERO. EQUIV: 30
HLA-A 1: NORMAL
HLA-A 2 SERO. EQUIV: 36
HLA-A 2: NORMAL
HLA-B 1 SERO. EQUIV: 71
HLA-B 1: NORMAL
HLA-B 2 SERO. EQUIV: 42
HLA-B 2: NORMAL
HLA-BW 1 SERO. EQUIV: 6
HLA-BW 2 SERO. EQUIV: NORMAL
HLA-C 1: NORMAL
HLA-C 2: NORMAL
HLA-CW 1 SERO. EQUIV: 10
HLA-CW 2 SERO. EQUIV: 17
HLA-DQ 1 SERO. EQUIV: 7
HLA-DQ 2 SERO. EQUIV: NORMAL
HLA-DQB1 1: NORMAL
HLA-DQB1 2: NORMAL
HLA-DRB1 1 SERO. EQUIV: 8
HLA-DRB1 1: NORMAL
HLA-DRB1 2 SERO. EQUIV: NORMAL
HLA-DRB1 2: NORMAL
HLA-DRB3 1: NORMAL
HLA-DRB3 2: NORMAL
HLA-DRB345 1 SERO. EQUIV: NORMAL
HLA-DRB345 2 SERO. EQUIV: NORMAL
HLA-DRB4 2: NORMAL
HLA-DRB5 1: NORMAL
HLA-DRB5 2: NORMAL
IMM GRANULOCYTES # BLD AUTO: 0.08 K/UL (ref 0–0.04)
IMM GRANULOCYTES NFR BLD AUTO: 0.7 % (ref 0–0.5)
LEFT AXILLARY ARTERY: 0.65 CM/S
LEFT DIST SUBCLAVIAN ARTERY: 0.69 CM
LEFT MID SUBCLAVIAN ARTERY: 0.7 CM/S
LEFT PROX SUBCLAVIAN ARTERY: 0.7 CM/S
LYMPHOCYTES # BLD AUTO: 2 K/UL (ref 1–4.8)
LYMPHOCYTES NFR BLD: 17.2 % (ref 18–48)
MAGNESIUM SERPL-MCNC: 2.3 MG/DL (ref 1.6–2.6)
MAGNESIUM SERPL-MCNC: 2.5 MG/DL (ref 1.6–2.6)
MCH RBC QN AUTO: 26.8 PG (ref 27–31)
MCHC RBC AUTO-ENTMCNC: 32.2 G/DL (ref 32–36)
MCV RBC AUTO: 83 FL (ref 82–98)
METHEMOGLOBIN: 0.5 % (ref 0–3)
MODE: ABNORMAL
MODE: ABNORMAL
MONOCYTES # BLD AUTO: 0.6 K/UL (ref 0.3–1)
MONOCYTES NFR BLD: 5.3 % (ref 4–15)
NEUTROPHILS # BLD AUTO: 8.6 K/UL (ref 1.8–7.7)
NEUTROPHILS NFR BLD: 75.4 % (ref 38–73)
NRBC BLD-RTO: 0 /100 WBC
PCO2 BLDA: 54 MMHG (ref 35–45)
PCO2 BLDA: 54.2 MMHG (ref 35–45)
PH SMN: 7.44 [PH] (ref 7.35–7.45)
PH SMN: 7.46 [PH] (ref 7.35–7.45)
PLATELET # BLD AUTO: 418 K/UL (ref 150–350)
PMV BLD AUTO: 10.2 FL (ref 9.2–12.9)
PO2 BLDA: 26 MMHG (ref 40–60)
PO2 BLDA: 28 MMHG (ref 40–60)
POC BE: 13 MMOL/L
POC BE: 14 MMOL/L
POC SATURATED O2: 48 % (ref 95–100)
POC SATURATED O2: 55 % (ref 95–100)
POC TCO2: 39 MMOL/L (ref 24–29)
POC TCO2: 40 MMOL/L (ref 24–29)
POCT GLUCOSE: 133 MG/DL (ref 70–110)
POCT GLUCOSE: 140 MG/DL (ref 70–110)
POCT GLUCOSE: 143 MG/DL (ref 70–110)
POCT GLUCOSE: 170 MG/DL (ref 70–110)
POTASSIUM SERPL-SCNC: 4.5 MMOL/L (ref 3.5–5.1)
POTASSIUM SERPL-SCNC: 4.5 MMOL/L (ref 3.5–5.1)
PROT SERPL-MCNC: 7.7 G/DL (ref 6–8.4)
PROT SERPL-MCNC: 8.2 G/DL (ref 6–8.4)
RBC # BLD AUTO: 4.14 M/UL (ref 4.6–6.2)
RIGHT AXILLARY ARTERY MAPPING: 0.62 CM
RIGHT DIST SUBCLAVIAN ARTERY: 0.69 CM
RIGHT MID SUBCLAVIAN ARTERY: 0.67 CM
RIGHT PROX SUBCLAVIAN ARTERY: 0.78 CM
SAMPLE: ABNORMAL
SAMPLE: ABNORMAL
SITE: ABNORMAL
SITE: ABNORMAL
SODIUM SERPL-SCNC: 128 MMOL/L (ref 136–145)
SODIUM SERPL-SCNC: 128 MMOL/L (ref 136–145)
SODIUM SERPL-SCNC: 130 MMOL/L (ref 136–145)
SSDQB TESTING DATE: NORMAL
SSDRB TESTING DATE: NORMAL
SSOA TESTING DATE: NORMAL
SSOB TESTING DATE: NORMAL
SSOC TESTING DATE: NORMAL
SSODR TESTING DATE: NORMAL
T GONDII IGG SER QL IA: NORMAL
T GONDII IGG SERPL IA-ACNC: <5 IU/ML (ref 0–6.4)
TYSSO TESTING DATE: NORMAL
UPPER ARTERIAL LEFT ARM AXILLARY SYS MAX: 57 CM/S
UPPER ARTERIAL LEFT ARM SUBCLAVIAN SYS MAX: 62 CM/S
UPPER ARTERIAL RIGHT ARM AXILLARY SYS MAX: 51 CM/S
UPPER ARTERIAL RIGHT ARM SUBCLAVIAN SYS MAX: 54 CM/S
WBC # BLD AUTO: 11.35 K/UL (ref 3.9–12.7)

## 2020-01-29 PROCEDURE — 90715 TDAP VACCINE 7 YRS/> IM: CPT | Performed by: NURSE PRACTITIONER

## 2020-01-29 PROCEDURE — 93930 UPPER EXTREMITY STUDY: CPT | Mod: 26,,, | Performed by: INTERNAL MEDICINE

## 2020-01-29 PROCEDURE — 99291 CRITICAL CARE FIRST HOUR: CPT | Mod: ,,, | Performed by: INTERNAL MEDICINE

## 2020-01-29 PROCEDURE — 85025 COMPLETE CBC W/AUTO DIFF WBC: CPT

## 2020-01-29 PROCEDURE — 83735 ASSAY OF MAGNESIUM: CPT

## 2020-01-29 PROCEDURE — 20000000 HC ICU ROOM

## 2020-01-29 PROCEDURE — 94761 N-INVAS EAR/PLS OXIMETRY MLT: CPT | Mod: NTX

## 2020-01-29 PROCEDURE — 80053 COMPREHEN METABOLIC PANEL: CPT | Mod: 91

## 2020-01-29 PROCEDURE — 25000003 PHARM REV CODE 250: Mod: NTX | Performed by: NURSE PRACTITIONER

## 2020-01-29 PROCEDURE — 97530 THERAPEUTIC ACTIVITIES: CPT

## 2020-01-29 PROCEDURE — 82803 BLOOD GASES ANY COMBINATION: CPT | Mod: NTX

## 2020-01-29 PROCEDURE — 99291 PR CRITICAL CARE, E/M 30-74 MINUTES: ICD-10-PCS | Mod: ,,, | Performed by: INTERNAL MEDICINE

## 2020-01-29 PROCEDURE — 99291 PR CRITICAL CARE, E/M 30-74 MINUTES: ICD-10-PCS | Mod: ,,, | Performed by: PHYSICIAN ASSISTANT

## 2020-01-29 PROCEDURE — 93930 UPPER EXTREMITY STUDY: CPT | Mod: 50

## 2020-01-29 PROCEDURE — 85730 THROMBOPLASTIN TIME PARTIAL: CPT

## 2020-01-29 PROCEDURE — 25000003 PHARM REV CODE 250: Mod: NTX | Performed by: STUDENT IN AN ORGANIZED HEALTH CARE EDUCATION/TRAINING PROGRAM

## 2020-01-29 PROCEDURE — 84295 ASSAY OF SERUM SODIUM: CPT

## 2020-01-29 PROCEDURE — 63600175 PHARM REV CODE 636 W HCPCS: Performed by: STUDENT IN AN ORGANIZED HEALTH CARE EDUCATION/TRAINING PROGRAM

## 2020-01-29 PROCEDURE — 83735 ASSAY OF MAGNESIUM: CPT | Mod: 91

## 2020-01-29 PROCEDURE — 97535 SELF CARE MNGMENT TRAINING: CPT

## 2020-01-29 PROCEDURE — 63600367 HC NITRIC OXIDE PER HOUR: Mod: NTX

## 2020-01-29 PROCEDURE — 93930 CV US DOPPLER ARTERIAL ARMS BILATERAL (CUPID ONLY): ICD-10-PCS | Mod: 26,,, | Performed by: INTERNAL MEDICINE

## 2020-01-29 PROCEDURE — 90471 IMMUNIZATION ADMIN: CPT | Performed by: NURSE PRACTITIONER

## 2020-01-29 PROCEDURE — 99900035 HC TECH TIME PER 15 MIN (STAT)

## 2020-01-29 PROCEDURE — 63600175 PHARM REV CODE 636 W HCPCS: Performed by: NURSE PRACTITIONER

## 2020-01-29 PROCEDURE — 25000003 PHARM REV CODE 250: Performed by: INTERNAL MEDICINE

## 2020-01-29 PROCEDURE — 99900035 HC TECH TIME PER 15 MIN (STAT): Mod: NTX

## 2020-01-29 PROCEDURE — 25000003 PHARM REV CODE 250: Performed by: HOSPITALIST

## 2020-01-29 PROCEDURE — 27000221 HC OXYGEN, UP TO 24 HOURS: Mod: NTX

## 2020-01-29 PROCEDURE — 25000003 PHARM REV CODE 250: Performed by: PHYSICIAN ASSISTANT

## 2020-01-29 PROCEDURE — 63600367 HC NITRIC OXIDE PER HOUR

## 2020-01-29 PROCEDURE — 63600175 PHARM REV CODE 636 W HCPCS: Performed by: HOSPITALIST

## 2020-01-29 PROCEDURE — 99291 CRITICAL CARE FIRST HOUR: CPT | Mod: ,,, | Performed by: PHYSICIAN ASSISTANT

## 2020-01-29 PROCEDURE — 80053 COMPREHEN METABOLIC PANEL: CPT

## 2020-01-29 PROCEDURE — 25000003 PHARM REV CODE 250: Mod: NTX | Performed by: INTERNAL MEDICINE

## 2020-01-29 RX ORDER — TOLVAPTAN 15 MG/1
15 TABLET ORAL ONCE
Status: COMPLETED | OUTPATIENT
Start: 2020-01-29 | End: 2020-01-29

## 2020-01-29 RX ADMIN — HYDRALAZINE HYDROCHLORIDE 50 MG: 50 TABLET ORAL at 02:01

## 2020-01-29 RX ADMIN — STANDARDIZED SENNA CONCENTRATE AND DOCUSATE SODIUM 2 TABLET: 8.6; 5 TABLET ORAL at 09:01

## 2020-01-29 RX ADMIN — EPINEPHRINE 0.04 MCG/KG/MIN: 1 INJECTION PARENTERAL at 11:01

## 2020-01-29 RX ADMIN — DOBUTAMINE IN DEXTROSE 5 MCG/KG/MIN: 200 INJECTION, SOLUTION INTRAVENOUS at 04:01

## 2020-01-29 RX ADMIN — Medication 800 MG: at 09:01

## 2020-01-29 RX ADMIN — ATORVASTATIN CALCIUM 40 MG: 20 TABLET, FILM COATED ORAL at 08:01

## 2020-01-29 RX ADMIN — ACETAMINOPHEN 1000 MG: 500 TABLET ORAL at 02:01

## 2020-01-29 RX ADMIN — ACETAMINOPHEN 1000 MG: 500 TABLET ORAL at 09:01

## 2020-01-29 RX ADMIN — Medication 6 MG: at 09:01

## 2020-01-29 RX ADMIN — CETIRIZINE HYDROCHLORIDE 10 MG: 10 TABLET, FILM COATED ORAL at 08:01

## 2020-01-29 RX ADMIN — FAMOTIDINE 20 MG: 40 POWDER, FOR SUSPENSION ORAL at 08:01

## 2020-01-29 RX ADMIN — FUROSEMIDE 20 MG/HR: 10 INJECTION, SOLUTION INTRAMUSCULAR; INTRAVENOUS at 04:01

## 2020-01-29 RX ADMIN — ISOSORBIDE DINITRATE 10 MG: 10 TABLET ORAL at 09:01

## 2020-01-29 RX ADMIN — CLOSTRIDIUM TETANI TOXOID ANTIGEN (FORMALDEHYDE INACTIVATED), CORYNEBACTERIUM DIPHTHERIAE TOXOID ANTIGEN (FORMALDEHYDE INACTIVATED), BORDETELLA PERTUSSIS TOXOID ANTIGEN (GLUTARALDEHYDE INACTIVATED), BORDETELLA PERTUSSIS FILAMENTOUS HEMAGGLUTININ ANTIGEN (FORMALDEHYDE INACTIVATED), BORDETELLA PERTUSSIS PERTACTIN ANTIGEN, AND BORDETELLA PERTUSSIS FIMBRIAE 2/3 ANTIGEN 0.5 ML: 5; 2; 2.5; 5; 3; 5 INJECTION, SUSPENSION INTRAMUSCULAR at 05:01

## 2020-01-29 RX ADMIN — TRIAMCINOLONE ACETONIDE: 5 CREAM TOPICAL at 03:01

## 2020-01-29 RX ADMIN — HYDRALAZINE HYDROCHLORIDE 50 MG: 50 TABLET ORAL at 06:01

## 2020-01-29 RX ADMIN — HYDRALAZINE HYDROCHLORIDE 50 MG: 50 TABLET ORAL at 09:01

## 2020-01-29 RX ADMIN — HEPARIN SODIUM AND DEXTROSE 15 UNITS/KG/HR: 10000; 5 INJECTION INTRAVENOUS at 04:01

## 2020-01-29 RX ADMIN — TRIAMCINOLONE ACETONIDE: 5 CREAM TOPICAL at 09:01

## 2020-01-29 RX ADMIN — Medication 800 MG: at 08:01

## 2020-01-29 RX ADMIN — ISOSORBIDE DINITRATE 10 MG: 10 TABLET ORAL at 02:01

## 2020-01-29 RX ADMIN — TRIAMCINOLONE ACETONIDE: 5 CREAM TOPICAL at 08:01

## 2020-01-29 RX ADMIN — ISOSORBIDE DINITRATE 10 MG: 10 TABLET ORAL at 06:01

## 2020-01-29 RX ADMIN — DOBUTAMINE IN DEXTROSE 5 MCG/KG/MIN: 200 INJECTION, SOLUTION INTRAVENOUS at 08:01

## 2020-01-29 RX ADMIN — TOLVAPTAN 15 MG: 15 TABLET ORAL at 02:01

## 2020-01-29 NOTE — PLAN OF CARE
Goals achieved.   Problem: Occupational Therapy Goal  Goal: Occupational Therapy Goal  Description  Goals to be met by: 7 days 1/30/20     Patient will increase functional independence with ADLs by performing:    Pt to complete UE dressing with set-up-MET  Pt to complete LE dressing with SBA with AE-MET   Pt to complete toileting with supervision.--MET  Pt to complete standing g/h skills with supervision.--MET  Pt to complete t/f to commode, bed and chair with SBA--MET        Outcome: Met

## 2020-01-29 NOTE — PROGRESS NOTES
Update    Pt presents in room alone, aaox4 with neutral affect. Pt states understanding of plan of care. Pt states MDs informed him of committee decision and he has told his mother. Pt denies concerns about the decision. Pt reports coping well and denies further needs, questions, concerns at this time and none indicated. Providing ongoing psychosocial and counseling support, education, resources, assistance and discharge planning as indicated. Following and available.

## 2020-01-29 NOTE — ASSESSMENT & PLAN NOTE
-Creatinine 2.2 today. Was 3.0 on admit and got as high as 4.3 at OSH prior to transfer. Renal function was normal 8 months ago.  -Decreasing Lasix did not change creat much.  May need IABP for afterload reduction.  SVR elevated and BP limits what can be done medically  -Cont to monitor with diuresis

## 2020-01-29 NOTE — PROGRESS NOTES
PHARM.D. PRE-TRANSPLANT NOTE:    This patient's medication therapy was evaluated as part of his pre-transplant evaluation.      The following pharmacologic concerns were noted: None      The following medications require consideration prior to HCV DAA therapy:   Antacids / H2 receptor antagonist: Famotidine    Anticoagulation therapy indicated post transplant (warfarin or DOA): Currently on apixaban changed to UFH for DVT right lower extremity    The following factors were assessed to determine risk of rejection:     Age: 54 y.o.  Gender: male  Race: Black or   Prior Pregnancy: NA  cPRA current / peak within one year: 0;   HLA: WEAK B38(2275), CW1(1795), CW12(1615), B57(1514)     Saba Lott is considered low risk for rejection, and would not require thymoglobulin induction. Consideration will also be given to HLA antigen match, presence of DSA, crossmatch and cPRA at the time of transplant.     No current facility-administered medications for this visit.      No current outpatient medications on file.     Facility-Administered Medications Ordered in Other Visits   Medication Dose Route Frequency Provider Last Rate Last Dose    acetaminophen tablet 1,000 mg  1,000 mg Oral Q8H Lian Daniel MD   Stopped at 01/29/20 0600    albuterol inhaler 2 puff  2 puff Inhalation Q6H PRN Dominga Laboy MD   2 puff at 01/17/20 0926    atorvastatin tablet 40 mg  40 mg Oral Daily Dominga Laboy MD   40 mg at 01/29/20 0837    bisacodyl suppository 10 mg  10 mg Rectal Daily PRN Dominga Laboy MD        cetirizine tablet 10 mg  10 mg Oral Daily Lian Daniel MD   10 mg at 01/29/20 0837    dextrose 10% (D10W) Bolus  12.5 g Intravenous PRN Asad Westbrook MD        dextrose 10% (D10W) Bolus  25 g Intravenous PRN Asad Westbrook MD        DIPH,PERTUSS(ACEL),TET VAC(PF)(ADULT)(ADACEL)(TDaP)  0.5 mL Intramuscular Once DOMINGO Perez, ANP        DOBUTamine 500mg in D5W 250mL infusion (premix)  (NON-TITRATING)  5 mcg/kg/min (Dosing Weight) Intravenous Continuous Dominga Laboy MD 17.1 mL/hr at 01/29/20 0800 5 mcg/kg/min at 01/29/20 0800    EPINEPHrine (ADRENALIN) 5 mg in sodium chloride 0.9% 250 mL infusion  0.04 mcg/kg/min (Dosing Weight) Intravenous Continuous Asad Westbrook MD 13.7 mL/hr at 01/29/20 0800 0.04 mcg/kg/min at 01/29/20 0800    famotidine 40 mg/5 mL (8 mg/mL) suspension 20 mg  20 mg Oral Daily Lian Daniel MD   20 mg at 01/29/20 0837    furosemide (LASIX) 500 mg infusion (conc: 10 mg/mL)  20 mg/hr Intravenous Continuous Mallika Lugo NP 2 mL/hr at 01/29/20 0800 20 mg/hr at 01/29/20 0800    glucagon (human recombinant) injection 1 mg  1 mg Intramuscular PRN Asad Westbrook MD        glucose chewable tablet 16 g  16 g Oral PRN Asad Westbrook MD        glucose chewable tablet 24 g  24 g Oral PRN Asad Westbrook MD        heparin 25,000 units in dextrose 5% (100 units/ml) IV bolus from bag - ADDITIONAL PRN BOLUS - 30 units/kg  30 Units/kg (Adjusted) Intravenous PRN Dominga Laboy MD   2,560 Units at 01/26/20 1215    heparin 25,000 units in dextrose 5% (100 units/ml) IV bolus from bag - ADDITIONAL PRN BOLUS - 60 units/kg  60 Units/kg (Adjusted) Intravenous PRN Dominga Laboy MD        heparin 25,000 units in dextrose 5% 250 mL (100 units/mL) infusion LOW INTENSITY nomogram - [facility]  12 Units/kg/hr (Adjusted) Intravenous Continuous Dominga Laboy MD 12.8 mL/hr at 01/29/20 0800 15.006 Units/kg/hr at 01/29/20 0800    hepatitis B (HEPLISAV-B) 20 mcg/0.5 mL vaccine 0.5 mL  0.5 mL Intramuscular Prior to discharge Lindsay Rowan, APRN, ANP        hydrALAZINE tablet 50 mg  50 mg Oral Q8H Asad Westbrook MD   50 mg at 01/29/20 0629    hydrOXYzine HCl tablet 25 mg  25 mg Oral TID PRN CONG Peterson   25 mg at 01/22/20 2208    insulin aspart U-100 pen 0-5 Units  0-5 Units Subcutaneous QID (AC + HS) PRN Asad Westbrook MD   5 Units at 01/27/20 1719    isosorbide dinitrate tablet  10 mg  10 mg Oral Q8H Mallika Lugo NP   10 mg at 01/29/20 0630    magnesium oxide tablet 800 mg  800 mg Oral BID Toni Ruano MD   800 mg at 01/29/20 0837    magnesium sulfate 1 g in dextrose 5 % 50 mL IVPB  1 g Intravenous Once Dominga Laboy MD        melatonin tablet 6 mg  6 mg Oral Nightly Lian Daniel MD   6 mg at 01/28/20 2202    nitric oxide gas Gas 20 ppm  20 ppm Inhalation Continuous Asad Westbrook MD   20 ppm at 01/26/20 1739    pneumococcal vaccine (PNU-IMMUNE 23) injection 0.5 mL  0.5 mL Intramuscular vaccine x 1 dose Lindsay Rowan, APRN, ANP        polyethylene glycol packet 17 g  17 g Oral Daily Dominga Laboy MD   17 g at 01/28/20 0847    senna-docusate 8.6-50 mg per tablet 2 tablet  2 tablet Oral BID Mallika Lugo NP   Stopped at 01/28/20 2100    sodium chloride 0.9% flush 10 mL  10 mL Intravenous PRN Dominga Laboy MD        traMADol tablet 50 mg  50 mg Oral Q6H PRN Lian Daniel MD   50 mg at 01/27/20 2110    triamcinolone acetonide 0.5% cream   Topical (Top) TID CONG Peterson           I am available for consultation and can be contacted, as needed by the other members of the team.

## 2020-01-29 NOTE — ASSESSMENT & PLAN NOTE
-NICM; Likely Etoh induced  -Transferred from [facility] with IABP at 1:1 for further level of care. IABP removed 1/25  -Last 2D Echo 1/23/20: LVEF 20%, LVEDD 6.46 cm, RV mod dilated and mild to mod depressed, IVC 15  -RHC 1/27 on  5, Epi 0.04, Radha 20 ppm and Lasix 40 mg/hr: RA 12 (not correlating with CVP of 20 by PICC same day), PAP 38/22 (30), W 20 and CO/CI 4.11/2.06.   -Lasix decreased to 20mg/hr yesterday.  He is positive over the last 24 hours.  Creat is up to 2.2.  Dosing Tolvaptan today.   -GDMT: Continue Hyd/Isordil  - He has completed the pathway and was presented at selection 1/29/20: It was the committee decision to decline the pt for transplant listing due to debility, poor renal function and lack of backup caregiver. Pt is deferred for LVAD at this time pending improvement in renal function and increase in physical activity.

## 2020-01-29 NOTE — PT/OT/SLP PROGRESS
Occupational Therapy   Treatment    Name: Saba Lott  MRN: [id number]  Admitting Diagnosis:  Cardiogenic shock  2 Days Post-Op    Recommendations:     Discharge Recommendations: home      Assessment:     Saba Lott is a 54 y.o. male with a medical diagnosis of Cardiogenic shock.  . Performance deficits affecting function are weakness, impaired functional mobilty, impaired endurance, impaired balance, impaired self care skills, gait instability. Pt tolerated session well with good effort and performance. Pt has progressed well and has achieved stated goals. OT to d/c services at this time.Pt has achieved optimal independence at this time in setting of ICU with extensive lines in place as a barrier to full independence.  OT anticipates reconsult if pt has LVAD placed to allow for assist with return to independence.       Plan:     · OT d/c 1/29/2020     Subjective     Pt agreeable to therapy   Pain/Comfort:  · Pain Rating 1: 0/10    Objective:     Communicated with: nsg prior to session. Pt found supine in bed with NO in place.     General Precautions: Standard, fall       Occupational Performance:     Bed Mobility:    Supine>sit with independence.     Functional Mobility/Transfers:  · Sit>stand with supervision without UE support from bed.  · Sit>stand from b/s chair with MARKOS without UE support; SBA to stand with UE support.     Activities of Daily Living:  · Feeding: independent  · G/H; standing with supervision.  · UE dressing: MIN A only due to extensive lines.  · LE dressing: TOTAL A:  simulated with set-up with AE as used PTA due to LE injuries/surgeries.  · Toileting: supervision simulated with adequate balance and endurance needed for hygiene and clothing mangt.       [facility] 6 Click ADL: 19    Treatment & Education:  Pt completed functional mobility and ADL skills in room as indicated. Minimal SOB noted with stable vital signs throughout session.  Education provided re: importance of continued UE HEP and  functional activity in room with nsg supervision. Pt verb understanding.  Education provided re: OT POC including d/c of OT services. Pt receptive and verb understanding.     Patient left seated in chair with needs in reach and nsg notified.     GOALS:   Multidisciplinary Problems     Occupational Therapy Goals     Not on file          Multidisciplinary Problems (Resolved)        Problem: Occupational Therapy Goal    Goal Priority Disciplines Outcome Interventions   Occupational Therapy Goal   (Resolved)     OT, PT/OT Met    Description:  Goals to be met by: 7 days 1/30/20     Patient will increase functional independence with ADLs by performing:    Pt to complete UE dressing with set-up-MET  Pt to complete LE dressing with SBA with AE-MET   Pt to complete toileting with supervision.--MET  Pt to complete standing g/h skills with supervision.--MET  Pt to complete t/f to commode, bed and chair with SBA--MET                         Time Tracking:     OT Date of Treatment: 01/29/20  OT Start Time: 0945  OT Stop Time: 1010  OT Total Time (min): 25 min    Billable Minutes:Self Care/Home Management 15  Therapeutic Activity 10    KARLY Castillo  1/29/2020

## 2020-01-29 NOTE — CARE UPDATE
BG goal 140 - 180      BG is reasonably controlled with minimal to no use of insulin therapy.   Low Dose SQ Insulin Correction Scale PRN BG excursions.   BG Monitoring AC/HS      ** Please call Endocrine for any BG related issues **  ** Please notify Endocrine for any change and/or advance in diet**     Discharge Planning:    TBD. Please notify endocrinology prior to discharge.   - Recommend patient be seen in outpatient setting for further evaluation of DM diagnosis.   - Would recommend lifestyle modifications for 3 months, and then have A1c re-evaluated. However, if Patient continues to require insulin in hospital, I would consider starting patient on metformin if EF is stable.      -Metformin 500mg xr pills:  1 pill every morning for 1 week   THEN  1 pill with breakfast, 1 pill with dinner for week 2   THEN  1 pill with breakfast, 2 pills with dinner for week 3   THEN  2 pills with breakfast, 2 pills with dinner

## 2020-01-29 NOTE — NURSING
CMICU DAILY GOALS       A: Awake    RASS: Goal - RASS Goal: 0-->alert and calm  Actual - RASS (Mcmillan Agitation-Sedation Scale): 0-->alert and calm   Restraint necessity: Clinical Justification: Removing medical devices  B: Breath   SBT: Not intubated   C: Coordinate A & B, analgesics/sedatives   Pain: managed    SAT: Not intubated  D: Delirium   CAM-ICU: Overall CAM-ICU: Negative  E: Early Mobility   MOVE Screen: Pass   Activity: Activity Management: activity adjusted per tolerance  FAS: Feeding/Nutrition   Diet order: Diet/Nutrition Received: low saturated fat/low cholesterol,   Fluid restriction: Fluid Requirement: 1000 cc FR  T: Thrombus   DVT prophylaxis: VTE Required Core Measure: Pharmacological prophylaxis initiated/maintained  H: HOB Elevation   Head of Bed (HOB): HOB at 30 degrees  U: Ulcer Prophylaxis   GI: yes  G: Glucose control   managed    S: Skin   Bundle compliance: yes   Bathing/Skin Care: bath, partial, bath, chlorhexidine, dressed/undressed, linen changed Date: 1/25  B: Bowel Function   no issues   I: Indwelling Catheters   Mc necessity:      Urethral Catheter 01/15/20 2030 Straight-tip 16 Fr.-Reason for Continuing Urinary Catheterization: Critically ill in ICU requiring intensive monitoring   CVC necessity: Yes   IPAD offered: Yes  D: De-escalation Antibx   Yes  Plan for the day   RHC Monday  Family/Goals of care/Code Status   Code Status: Full Code     No acute events throughout day, VS and assessment per flow sheet, patient progressing towards goals as tolerated, plan of care reviewed with Saba Lott and family, all concerns addressed, will continue to monitor.

## 2020-01-29 NOTE — SUBJECTIVE & OBJECTIVE
**Interval History: Patient reports no new complaints today.  He walked to halls yesterday without a walker and did not have difficulty.  Lasix drip decreased to 20mg/hr; he is +127cc in the last 24 hours.  CVP: 15, SVO2: 55, CO: 4.29, CI: 2.24, SVR: 1286.  Repeat CVP higher at 19.  Tolvaptan ordered for today as Na is 128.  Ordering bilaterally upper extremity arterial ultrasound in the event he is candidate for axillary IABP    Lines:  Picc: 1/14/20    Continuous Infusions:   DOBUTamine 5 mcg/kg/min (01/29/20 1200)    epinephrine infustion (NON-TITRATING) 0.04 mcg/kg/min (01/29/20 1200)    furosemide (LASIX) 10 mg/mL infusion (non-titrating) 20 mg/hr (01/29/20 1200)    heparin (porcine) in D5W 15.006 Units/kg/hr (01/29/20 1200)    nitric oxide gas       Scheduled Meds:   acetaminophen  1,000 mg Oral Q8H    atorvastatin  40 mg Oral Daily    cetirizine  10 mg Oral Daily    DIPH,PERTUSS(ACEL),TET VAC(PF) (ADULT)  0.5 mL Intramuscular Once    famotidine  20 mg Oral Daily    hydrALAZINE  50 mg Oral Q8H    isosorbide dinitrate  10 mg Oral Q8H    magnesium oxide  800 mg Oral BID    magnesium sulfate IVPB  1 g Intravenous Once    melatonin  6 mg Oral Nightly    polyethylene glycol  17 g Oral Daily    senna-docusate 8.6-50 mg  2 tablet Oral BID    tolvaptan  15 mg Oral Once    triamcinolone acetonide 0.5%   Topical (Top) TID     PRN Meds:albuterol, bisacodyl, Dextrose 10% Bolus, Dextrose 10% Bolus, glucagon (human recombinant), glucose, glucose, heparin (PORCINE), heparin (PORCINE), hepatitis B, hydrOXYzine HCl, insulin aspart U-100, pneumococcal vaccine, sodium chloride 0.9%, traMADol    Review of patient's allergies indicates:   Allergen Reactions    Iodine and iodide containing products      Objective:     Vital Signs (Most Recent):  Temp: 97.9 °F (36.6 °C) (01/29/20 1100)  Pulse: 101 (01/29/20 1200)  Resp: (!) 21 (01/29/20 1200)  BP: 97/66 (01/29/20 1200)  SpO2: 100 % (01/29/20 1200) Vital Signs  (24h Range):  Temp:  [97.7 °F (36.5 °C)-98.7 °F (37.1 °C)] 97.9 °F (36.6 °C)  Pulse:  [] 101  Resp:  [0-49] 21  SpO2:  [93 %-100 %] 100 %  BP: ()/(50-84) 97/66     Patient Vitals for the past 72 hrs (Last 3 readings):   Weight   01/29/20 0315 82.2 kg (181 lb 3.5 oz)   01/28/20 1022 83 kg (182 lb 15.7 oz)   01/28/20 0806 83 kg (182 lb 15.7 oz)     Body mass index is 28.38 kg/m².      Intake/Output Summary (Last 24 hours) at 1/29/2020 1302  Last data filed at 1/29/2020 1200  Gross per 24 hour   Intake 1704.81 ml   Output 1943 ml   Net -238.19 ml       Hemodynamic Parameters:       Telemetry: ST  Physical Exam   Constitutional: He is oriented to person, place, and time. He appears well-developed and well-nourished.   HENT:   Head: Normocephalic and atraumatic.   Eyes: Pupils are equal, round, and reactive to light. Conjunctivae and EOM are normal.   Neck: Normal range of motion. Neck supple. JVD difficult to access given body habitus. No thyromegaly present.   Cardiovascular: Regular rhythm. Tachycardic, + S3. Grade II/VI systolic murmur LSB   Pulmonary/Chest: Effort normal and breath sounds normal.   Abdominal: Soft. Bowel sounds are normal.   Musculoskeletal: Normal range of motion. He exhibits no edema.   Neurological: He is alert and oriented to person, place, and time.   Skin: Skin is warm and dry. Capillary refill takes 2 to 3 seconds.   Psychiatric: He has a normal mood and affect. His behavior is normal. Judgment and thought content normal.       Significant Labs:  CBC:  Recent Labs   Lab 01/27/20  0409 01/28/20  0325 01/29/20  0414   WBC 11.99 11.37 11.35   RBC 4.35* 4.06* 4.14*   HGB 11.6* 10.8* 11.1*   HCT 36.2* 33.8* 34.5*   * 414* 418*   MCV 83 83 83   MCH 26.7* 26.6* 26.8*   MCHC 32.0 32.0 32.2     BNP:  Recent Labs   Lab 01/22/20  1752 01/27/20  1420   BNP 2,342* 3,095*     CMP:  Recent Labs   Lab 01/27/20  0410 01/27/20  1403 01/28/20  0325 01/29/20  0414   * 244* 236* 209*    CALCIUM 9.8 9.4 9.4 9.4   ALBUMIN 3.9  --  3.7 3.6   PROT 8.4  --  7.9 7.7   * 127* 128* 128*   K 4.1 4.6 4.3 4.5   CO2 32* 30* 30* 33*   CL 84* 84* 84* 86*   BUN 41* 44* 48* 49*   CREATININE 2.0* 2.1* 2.1* 2.2*   ALKPHOS 97  --  90 95   ALT 27  --  25 26   AST 31  --  35 32   BILITOT 1.3*  --  1.3* 1.0      Coagulation:   Recent Labs   Lab 01/23/20  0400  01/27/20  2318 01/28/20  0325 01/29/20  0414   INR 1.4*  --   --   --   --    APTT 47.2*   < > 46.5* 43.7* 46.2*    < > = values in this interval not displayed.     LDH:  No results for input(s): LDH in the last 72 hours.  Microbiology:  Microbiology Results (last 7 days)     ** No results found for the last 168 hours. **          I have reviewed all pertinent labs within the past 24 hours.    Estimated Creatinine Clearance: 38.9 mL/min (A) (based on SCr of 2.2 mg/dL (H)).    Diagnostic Results:  I have reviewed all pertinent imaging results/findings within the past 24 hours.

## 2020-01-29 NOTE — PLAN OF CARE
CM[facility] DAILY GOALS       A: Awake    RASS: Goal - RASS Goal: 0-->alert and calm  Actual - RASS (Mcmillan Agitation-Sedation Scale): 0-->alert and calm   Restraint necessity: Clinical Justification: Removing medical devices  B: Breath   SBT: Not intubated   C: Coordinate A & B, analgesics/sedatives   Pain: managed    SAT: Not intubated  D: Delirium   CAM-ICU: Overall CAM-ICU: Negative  E: Early Mobility   MOVE Screen: Pass   Activity: Activity Management: activity adjusted per tolerance, activity encouraged  FAS: Feeding/Nutrition   Diet order: Diet/Nutrition Received: low saturated fat/low cholesterol, no added salt, 2 gram sodium, restrict fluids,   Fluid restriction: Fluid Requirement: 1000 cc FR  T: Thrombus   DVT prophylaxis: VTE Required Core Measure: Pharmacological prophylaxis initiated/maintained  H: HOB Elevation   Head of Bed (HOB): HOB at 30-45 degrees  U: Ulcer Prophylaxis   GI: yes  G: Glucose control   managed    S: Skin   Bundle compliance: yes   Bathing/Skin Care: bath, partial, bath, chlorhexidine, dressed/undressed, linen changed Date: 1/28/20  B: Bowel Function   diarrhea   I: Indwelling Catheters   Mc necessity:      Urethral Catheter 01/15/20 2030 Straight-tip 16 Fr.-Reason for Continuing Urinary Catheterization: Critically ill in ICU requiring intensive monitoring   CVC necessity: Yes   IPAD offered: Yes  D: De-escalation Antibx   N/A   Plan for the day   Ambulate, continue workup for LVAD  Family/Goals of care/Code Status   Code Status: Full Code     No acute events throughout day, VS and assessment per flow sheet, patient progressing towards goals as tolerated, plan of care reviewed with Saba Lott and family, all concerns addressed, will continue to monitor.    No acute events throughout day, VS and assessment per flow sheet, see below for updates:    Pulmonary: 4L NC with 20 ppm NO.     Cardiovascular: -110s. BP 90s/50s. CVP 20s (see nurses note for more details)    Neurological:  AAOX4. Ambulated in [place] today with no incident.     Gastrointestinal: BM x 3.     Genitourinary: barton in place.  cc/hr haresh urine output    Endocrine: AC/HS. No issues    Skin/Bath: no issues  Date of last CHG bath given: 1/28/20    Infusions:  @ 5 mcg/kg/min, Epi @ 0.04 mcg/kg/min, Lasix @ 20 mg/hr, Heparin @ 15 units/kg/hr    Patient progressing towards goals as tolerated, plan of care communicated and reviewed with Saba Lott and family, all concerns addressed, will continue to monitor.     Miriam Venegas RN

## 2020-01-29 NOTE — NURSING
[facility]  Emergency Department Encounter  Emergency Medicine Attending     Pt Name: Pao Boles  BSD:[id number]  [place] 1986  Date of evaluation: 4/20/19  PCP:  Gilda García MD    CHIEF COMPLAINT       Chief Complaint   Patient presents with    Hyperglycemia       HISTORY OF PRESENT ILLNESS  (Location/Symptom, Timing/Onset, Context/Setting, Quality, Duration, ModifyingFactors, Severity.)      Pao Boles is a 34 y.o. male known type I diabetic with multiple admissions for DKA presents via EMS for tachypnea, decreased mentation, found to have a glucose and was read as high signifying  greater than 800. Patient states that he has been taking his medications as prescribed, admits to a productive cough for the last several days along with diffuse abdominal pain. Patient denied fever or chills, chest pain. Pt admitted to drinking 1 beer yesterday, none today. PAST MEDICAL / SURGICAL / SOCIAL /FAMILY HISTORY      has a past medical history of CLAIRE (acute kidney injury) ([facility] [address] Asthma, Asthma, mild intermittent, Bell's palsy, Blind right eye, Cerebral artery occlusion with cerebral infarction ([address] Chronic kidney disease, Diabetes mellitus ([facility] [address] DKA, type 2, not at goal [facility]), Headache, Hyperlipidemia, Hypertension, and Psychiatric problem. has a past surgical history that includes Elbow surgery (Left, 2009); eye surgery (Right); and eye surgery.     Social History     Socioeconomic History    Marital status:      Spouse name: Not on file    Number of children: Not on file    Years of education: Not on file    Highest education level: Not on file   Occupational History    Not on file   Social Needs    Financial resource strain: Not on file    Food insecurity:     Worry: Not on file     Inability: Not on file    Transportation needs:     Medical: Not on file     Non-medical: Not on file   Tobacco Use    Smoking status: Former Smoker     Packs/day: 1.00     Years: 5.00 Patient ripped off PICC dressing and began scratching around insertion site. RN immediately intervened and performed sterile dressing change. Pt educated on importance of not ripping things out or off especially central line dressings. Verbalized understanding   Pack years: 5.00     Types: Cigarettes    Smokeless tobacco: Never Used    Tobacco comment:  quit 2/01/2019   Substance and Sexual Activity    Alcohol use: No    Drug use: No    Sexual activity: Yes     Partners: Female   Lifestyle    Physical activity:     Days per week: Not on file     Minutes per session: Not on file    Stress: Not on file   Relationships    Social connections:     Talks on phone: Not on file     Gets together: Not on file     Attends Oriental orthodox service: Not on file     Active member of club or organization: Not on file     Attends meetings of clubs or organizations: Not on file     Relationship status: Not on file    Intimate partner violence:     Fear of current or ex partner: Not on file     Emotionally abused: Not on file     Physically abused: Not on file     Forced sexual activity: Not on file   Other Topics Concern    Not on file   Social History Narrative    Not on file       I counseled the patient against using tobacco products. Family History   Problem Relation Age of Onset    Diabetes Mother     High Blood Pressure Mother     High Cholesterol Mother     Clotting Disorder Mother     Diabetes Father     Diabetes Maternal Aunt     High Blood Pressure Maternal Grandmother     Stroke Paternal Grandmother     Heart Disease Paternal Cousin     Stroke Paternal Cousin     Diabetes Other        Allergies:  Bee venom; Other; and Metformin and related    Home Medications:  Prior to Admission medications    Medication Sig Start Date End Date Taking?  Authorizing Provider   ondansetron (ZOFRAN ODT) 4 MG disintegrating tablet Take 1 tablet by mouth every 8 hours as needed for Nausea 3/27/19   Bruna Marshall MD   insulin glargine (LANTUS) 100 UNIT/ML injection vial Inject 10 Units into the skin nightly 3/20/19   Maite Kaur MD   insulin lispro (HUMALOG KWIKPEN) 100 UNIT/ML pen Inject 3 Units into the skin 3 times daily (before meals) 3/20/19   Maite Kaur MD insulin glargine (LANTUS) 100 UNIT/ML injection vial Inject 25 Units into the skin daily (with breakfast) 3/19/19   Graciela Ibanez MD   lactobacillus (CULTURELLE) capsule Take 1 capsule by mouth 3 times daily (with meals) 3/19/19   Graciela Ibanez MD   dicyclomine (BENTYL) 10 MG capsule Take 1 capsule by mouth 3 times daily (before meals) 3/19/19   Graciela Ibanez MD   Insulin Pen Needle ([employer] PEN NEEDLES 29G) 29G X 12MM MISC 1 each by Does not apply route daily 12/18/18   Marichuy Freitas MD   pantoprazole (PROTONIX) 40 MG tablet Take 1 tablet by mouth every morning (before breakfast)  Patient taking differently: Take 40 mg by mouth as needed  11/13/18   Angela Barnes MD   acetaminophen (TYLENOL) 325 MG tablet Take 2 tablets by mouth every 8 hours as needed for Pain 7/18/18   Benson Ames MD   lisinopril (PRINIVIL;ZESTRIL) 5 MG tablet Take 1 tablet by mouth daily 7/18/18   Jamaal Dupree MD   atorvastatin (LIPITOR) 40 MG tablet Take 1 tablet by mouth daily 6/25/18   Benson Ames MD   albuterol sulfate HFA (PROAIR HFA) 108 (90 Base) MCG/ACT inhaler Inhale 2 puffs into the lungs every 6 hours as needed for Wheezing 6/25/18   Benson Ames MD   Insulin Pen Needle ([employer] PEN NEEDLES) 31G X 6 MM MISC 1 each by Does not apply route daily 6/25/18   Jamaal Dupree MD   [employer] LANCETS THIN MISC To check blood sugars 4 times daily. 6/25/18   Jamaal Dupree MD   Alcohol Swabs (ALCOHOL PADS) 70 % PADS Clean the area before checking blood sugars. 6/25/18   Jamaal Dupree MD   glucose blood VI test strips (ACCU-CHEK COMPACT TEST DRUM) strip 1 each by In Vitro route 2 times daily As needed.  5/17/18   Juan M Zavala MD   EPINEPHrine (EPIPEN 2-TOÑO) 0.3 MG/0.3ML SOAJ injection Use as directed for allergic reaction 2/1/18   Georgia Chavez MD   SOFT TOUCH LANCETS MISC 1 Units by Does not apply route 3 times daily 10/10/17   Alexx Canseco MD   Ascorbic Acid (VITAMIN C) 500 MG tablet Take 500 mg by mouth daily    Historical Provider, MD   vitamin D (ERGOCALCIFEROL) 08508 UNITS capsule Take 1 capsule by mouth once a week  Patient taking differently: Take 50,000 Units by mouth once a week  1/24/17   Carmen Moralez,        REVIEW OF SYSTEMS    (2-9 systems for level 4, 10 ormore for level 5)      Review of Systems   Constitutional: Negative for chills and fever. HENT: Negative for trouble swallowing. Eyes: Negative for visual disturbance. Respiratory: Positive for cough and shortness of breath. Cardiovascular: Negative for chest pain. Gastrointestinal: Positive for abdominal pain and nausea. Negative for vomiting. Genitourinary: Negative for dysuria and hematuria. Musculoskeletal: Negative for back pain. Skin: Negative for rash. Neurological: Negative for dizziness and light-headedness. PHYSICAL EXAM   (up to 7 for level 4, 8 or more for level 5)      INITIAL VITALS:   /77   Pulse 110   Temp 97.4 °F (36.3 °C) (Oral)   Resp 29   Wt 186 lb 1.1 oz (84.4 kg)   SpO2 100%   BMI 25.24 kg/m²     Physical Exam   Constitutional: He appears distressed. HENT:   Head: Normocephalic and atraumatic. Eyes: Pupils are equal, round, and reactive to light. Conjunctivae are normal.   Opaque to right eye. Neck: Neck supple. Cardiovascular: Regular rhythm, normal heart sounds and intact distal pulses. Exam reveals no gallop and no friction rub. No murmur heard. tachycardia   Pulmonary/Chest: Breath sounds normal. No stridor. He has no wheezes. He has no rales. Tachypnea, Kussmaul breathing. Pt with intact airway   Abdominal: Soft. Bowel sounds are normal. He exhibits no distension. There is no tenderness. There is no rebound and no guarding. Musculoskeletal: Normal range of motion. He exhibits no edema. Neurological: He is alert. Patient oriented to self, following commands, GCS 14. Answering questions in short sentences. Skin: Skin is warm and dry.        DIFFERENTIAL DIAGNOSIS     PLAN (LABS / IMAGING / EKG):  Orders Placed This Encounter   Procedures    RAPID INFLUENZA A/B ANTIGENS    Culture Blood #1    Culture Blood #1    XR CHEST PORTABLE    CBC WITH AUTO DIFFERENTIAL    Urinalysis with Microscopic    Hemoglobin and hematocrit, blood    SODIUM (POC)    POTASSIUM (POC)    CHLORIDE (POC)    CALCIUM, IONIC (POC)    SPECIMEN REJECTION    Beta-Hydroxybutyrate    Comprehensive Metabolic Panel    HYPOGLYCEMIA TREATMENT: blood glucose less than 50 mg/dL and patient  ALERT and TOLERATING PO    HYPOGLYCEMIA TREATMENT: blood glucose less than 70 mg/dL and patient ALERT and TOLERATING PO    HYPOGLYCEMIA TREATMENT: blood glucose less than 70 mg/dL and patient NOT ALERT or NPO    Inpatient consult to Critical Care    POC Blood Gas    POCT Glucose    POCT Glucose    Venous Blood Gas, POC    Creatinine W/GFR Point of Care    Lactic Acid, POC    POCT Glucose    Anion Gap (Calc) POC    Insert peripheral IV    PATIENT STATUS (FROM ED OR OR/PROCEDURAL) Inpatient       MEDICATIONS ORDERED:  Orders Placed This Encounter   Medications    0.9 % sodium chloride bolus    glucose (GLUTOSE) 40 % oral gel 15 g    dextrose 50 % solution 12.5 g    glucagon (rDNA) injection 1 mg    dextrose 5 % solution    insulin regular (HUMULIN R;NOVOLIN R) 100 Units in sodium chloride 0.9 % 100 mL infusion       DDX: DKA, PNA, sepsis, UTI,     DIAGNOSTIC RESULTS / EMERGENCY DEPARTMENT COURSE / MDM     LABS:  Results for orders placed or performed during the hospital encounter of 04/20/19   RAPID INFLUENZA A/B ANTIGENS   Result Value Ref Range    Specimen Description . NASOPHARYNGEAL SWAB     Special Requests NOT REPORTED     Direct Exam       PRESUMPTIVE NEGATIVE for Influenza A + B antigens. PCR testing to confirm this result is available upon request.  Specimen will be saved in the laboratory for 7 days.   Please call [phone] if PCR testing is indicated. CBC WITH AUTO DIFFERENTIAL   Result Value Ref Range    WBC 26.0 (H) 3.5 - 11.3 k/uL    RBC 4.71 4.21 - 5.77 m/uL    Hemoglobin 14.6 13.0 - 17.0 g/dL    Hematocrit 49.5 40.7 - 50.3 %    .1 (H) 82.6 - 102.9 fL    MCH 31.0 25.2 - 33.5 pg    MCHC 29.5 28.4 - 34.8 g/dL    RDW 12.5 11.8 - 14.4 %    Platelets 773 296 - 856 k/uL    MPV 11.4 8.1 - 13.5 fL    NRBC Automated 0.0 0.0 per 100 WBC    Differential Type NOT REPORTED     WBC Morphology NOT REPORTED     RBC Morphology NOT REPORTED     Platelet Estimate NOT REPORTED     Immature Granulocytes 1 (H) 0 %    Seg Neutrophils 75 (H) 36 - 66 %    Lymphocytes 14 (L) 24 - 44 %    Atypical Lymphocytes 2 %    Monocytes 8 (H) 1 - 7 %    Eosinophils % 0 (L) 1 - 4 %    Basophils 0 0 - 2 %    Absolute Immature Granulocyte 0.26 0.00 - 0.30 k/uL    Segs Absolute 19.50 (H) 1.8 - 7.7 k/uL    Absolute Lymph # 3.64 1.0 - 4.8 k/uL    Atypical Lymphocytes Absolute 0.52 k/uL    Absolute Mono # 2.08 (H) 0.1 - 0.8 k/uL    Absolute Eos # 0.00 0.0 - 0.4 k/uL    Basophils # 0.00 0.0 - 0.2 k/uL    Morphology MACROCYTOSIS PRESENT    Urinalysis with Microscopic   Result Value Ref Range    Color, UA YELLOW YELLOW    Turbidity UA CLEAR CLEAR    Glucose, Ur 3+ (A) NEGATIVE    Bilirubin Urine NEGATIVE NEGATIVE    Ketones, Urine LARGE (A) NEGATIVE    Specific Gravity, UA 1.025 1.005 - 1.030    Urine Hgb NEGATIVE NEGATIVE    pH, UA 5.0 5.0 - 8.0    Protein, UA TRACE (A) NEGATIVE    Urobilinogen, Urine Normal Normal    Nitrite, Urine NEGATIVE NEGATIVE    Leukocyte Esterase, Urine NEGATIVE NEGATIVE    -          WBC, UA 2 TO 5 0 - 5 /HPF    RBC, UA None 0 - 4 /HPF    Casts UA  0 - 8 /LPF     2 TO 5 HYALINE Reference range defined for non-centrifuged specimen.     Crystals UA NOT REPORTED None /HPF    Epithelial Cells UA 0 TO 2 0 - 5 /HPF    Renal Epithelial, Urine NOT REPORTED 0 /HPF    Bacteria, UA NOT REPORTED None    Mucus, UA NOT REPORTED None    Trichomonas, UA NOT REPORTED None Amorphous, UA NOT REPORTED None    Other Observations UA NOT REPORTED NOT REQ. Yeast, UA NOT REPORTED None   Hemoglobin and hematocrit, blood   Result Value Ref Range    POC Hemoglobin 17.3 13.5 - 17.5 g/dL    POC Hematocrit 51 41 - 53 %   SODIUM (POC)   Result Value Ref Range    POC Sodium 126 (L) 138 - 146 mmol/L   POTASSIUM (POC)   Result Value Ref Range    POC Potassium 6.7 (HH) 3.5 - 4.5 mmol/L   CHLORIDE (POC)   Result Value Ref Range    POC Chloride 104 98 - 107 mmol/L   CALCIUM, IONIC (POC)   Result Value Ref Range    POC Ionized Calcium 1.12 (L) 1.15 - 1.33 mmol/L   SPECIMEN REJECTION   Result Value Ref Range    Specimen Source . BLOOD     Ordered Test BH,CP     Reason for Rejection Unable to perform testing: Specimen hemolyzed.      - NOT REPORTED    Venous Blood Gas, POC   Result Value Ref Range    pH, Migue 6.913 (LL) 7.320 - 7.430    pCO2, Migue 20.9 (L) 41.0 - 51.0 mm Hg    pO2, Migue 35.9 30.0 - 50.0 mm Hg    HCO3, Venous 4.2 (L) 22.0 - 29.0 mmol/L    Total CO2, Venous <5 (L) 23.0 - 30.0 mmol/L    Negative Base Excess, Migue 28 (H) 0.0 - 2.0    Positive Base Excess, Migue NOT REPORTED 0.0 - 3.0    O2 Sat, Migue 38 (L) 60.0 - 85.0 %    O2 Device/Flow/% NOT REPORTED     Kevin Test NOT REPORTED     Sample Site NOT REPORTED     Mode NOT REPORTED     FIO2 NOT REPORTED     Pt Temp NOT REPORTED     POC pH Temp NOT REPORTED     POC pCO2 Temp NOT REPORTED mm Hg    POC pO2 Temp NOT REPORTED mm Hg   Creatinine W/GFR Point of Care   Result Value Ref Range    POC Creatinine 1.35 (H) 0.51 - 1.19 mg/dL    GFR Comment >60 >60 mL/min    GFR Non-African American >60 >60 mL/min    GFR Comment         Lactic Acid, POC   Result Value Ref Range    POC Lactic Acid 4.32 (H) 0.56 - 1.39 mmol/L   POCT Glucose   Result Value Ref Range    POC Glucose >700 (HH) 74 - 100 mg/dL   Anion Gap (Calc) POC   Result Value Ref Range    Anion Gap 18 (H) 7 - 16 mmol/L         IMPRESSION:  34 y.o. male PMH of DKA presents via EMS for altered mentation, tachypnea, glucose on readable. On arrival patient tachycardic, tachypneic, alert and following commands but with Kussmaul breathing. Patient with limited ROS, admitting to productive cough, diffuse abdominal pain, nausea, vomiting. States that he has been compliant with his medications. Point-of-care labs showed pH of 6.9, potassium 6.9, bicarb 4. Formal labs showed glucose over 900, lactic acid 4.32, potassium 7.1. Patient given 3 L of normal saline bolus, IV insulin drip. Chest x-ray was normal.  Low suspicion of sepsis. Patient needed to ICU under critical care management. RADIOLOGY:  Ct Abdomen Pelvis W Iv Contrast Additional Contrast? None    Result Date: 3/26/2019  EXAMINATION: CT OF THE ABDOMEN AND PELVIS WITH CONTRAST 3/26/2019 11:30 pm TECHNIQUE: CT of the abdomen and pelvis was performed with the administration of intravenous contrast. Multiplanar reformatted images are provided for review. Dose modulation, iterative reconstruction, and/or weight based adjustment of the mA/kV was utilized to reduce the radiation dose to as low as reasonably achievable. COMPARISON: Abdominal x-rays 07/20/2018 HISTORY: ORDERING SYSTEM PROVIDED HISTORY: abdominal pain TECHNOLOGIST PROVIDED HISTORY: FINDINGS: Lower Chest: Multiple varices versus veins identified along the lower thoracic spine. Lung bases are clear. Small hiatal hernia. Organs: Liver, gallbladder, spleen, adrenal glands and pancreas unremarkable. Prominent tubular structure identified along the renal arteries and veins. Right left-sided renal hypodensity identified small 0 some which left kidney pericecal fat containing may represent a small AML. Remainder most likely represent cysts and no further follow-up recommended. No hydronephrosis. GI/Bowel: Mild-to-moderate retained stool throughout the colon without bowel obstruction. Appendix unremarkable. Pelvis: The bladder and prostate unremarkable.  Peritoneum/Retroperitoneum: No free air or free fluid Bones/Soft Tissues: Severe endplate Schmorl's node involving L5. No acute inflammatory process or bowel obstruction. Mild retained stool throughout the colon. Xr Chest Portable    Result Date: 4/20/2019  EXAMINATION: SINGLE XRAY VIEW OF THE CHEST 4/20/2019 7:29 pm COMPARISON: March 6, 2019 HISTORY: ORDERING SYSTEM PROVIDED HISTORY: DKA work up TECHNOLOGIST PROVIDED HISTORY: DKA work up FINDINGS: The lungs are without acute focal process. There is no effusion or pneumothorax. The cardiomediastinal silhouette is without acute process. The osseous structures are without acute process. No acute process. EKG  None    All EKG's are interpreted by the Emergency Department Physician who either signs or Co-signs this chart in the absence of a cardiologist.    EMERGENCY DEPARTMENT COURSE:  ED Course as of Apr 20 2045   Sat Apr 20, 2019 1925 Will admit to ICU, pt with kussmaul breathing, pH 6.9, glucose > 700. [SF]   1939 Spoke with the ICU resident who will admit patient    [SF]   2001 Pt appearing slightly more alert. 3 Ivs obtained, fluids running.     [SF]      ED Course User Index  [SF] Gonzalez Chacon DO       PROCEDURES:  None    CONSULTS:  IP CONSULT TO CRITICAL CARE  IP CONSULT TO SOCIAL WORK        FINAL IMPRESSION      1.  Type 1 diabetes mellitus with ketoacidosis without coma ([facility] [address]          DISPOSITION / PLAN     DISPOSITION Admitted 04/20/2019 07:41:13 PM        PATIENT REFERREDTO:  Alfred Severino MD  [address] [place] [address]  [phone]          Anali Lugo Proc. Noel Rhodes [address] [place] [address]  [phone]            DISCHARGE MEDICATIONS:  Current Discharge Medication List          Joshua Polanco DO  PGY 1  Resident Physician Emergency Medicine  04/20/19 8:45 PM        (Please note that portions of this note were completed with a voice recognition program.Efforts were made to edit the dictations but occasionally words are mis-transcribed.)       Gonzalez DO Kayden  Resident  04/20/19 [id number]

## 2020-01-29 NOTE — PROGRESS NOTES
[facility]  Heart Transplant  Progress Note    Patient Name: Saba Lott  MRN: [id number]  Admission Date: 1/15/2020  Hospital Length of Stay: 14 days  Attending Physician: Lian Daniel MD  Primary Care Provider: Primary Doctor No  Principal Problem:Cardiogenic shock    Subjective:     **Interval History: Patient reports no new complaints today.  He walked to halls yesterday without a walker and did not have difficulty.  Lasix drip decreased to 20mg/hr; he is +127cc in the last 24 hours.  CVP: 15, SVO2: 55, CO: 4.29, CI: 2.24, SVR: 1286.  Repeat CVP higher at 19.  Tolvaptan ordered for today as Na is 128.  Ordering bilaterally upper extremity arterial ultrasound in the event he is candidate for axillary IABP    Lines:  Picc: 1/14/20    Continuous Infusions:   DOBUTamine 5 mcg/kg/min (01/29/20 1200)    epinephrine infustion (NON-TITRATING) 0.04 mcg/kg/min (01/29/20 1200)    furosemide (LASIX) 10 mg/mL infusion (non-titrating) 20 mg/hr (01/29/20 1200)    heparin (porcine) in D5W 15.006 Units/kg/hr (01/29/20 1200)    nitric oxide gas       Scheduled Meds:   acetaminophen  1,000 mg Oral Q8H    atorvastatin  40 mg Oral Daily    cetirizine  10 mg Oral Daily    DIPH,PERTUSS(ACEL),TET VAC(PF) (ADULT)  0.5 mL Intramuscular Once    famotidine  20 mg Oral Daily    hydrALAZINE  50 mg Oral Q8H    isosorbide dinitrate  10 mg Oral Q8H    magnesium oxide  800 mg Oral BID    magnesium sulfate IVPB  1 g Intravenous Once    melatonin  6 mg Oral Nightly    polyethylene glycol  17 g Oral Daily    senna-docusate 8.6-50 mg  2 tablet Oral BID    tolvaptan  15 mg Oral Once    triamcinolone acetonide 0.5%   Topical (Top) TID     PRN Meds:albuterol, bisacodyl, Dextrose 10% Bolus, Dextrose 10% Bolus, glucagon (human recombinant), glucose, glucose, heparin (PORCINE), heparin (PORCINE), hepatitis B, hydrOXYzine HCl, insulin aspart U-100, pneumococcal vaccine, sodium chloride 0.9%, traMADol    Review of  patient's allergies indicates:   Allergen Reactions    Iodine and iodide containing products      Objective:     Vital Signs (Most Recent):  Temp: 97.9 °F (36.6 °C) (01/29/20 1100)  Pulse: 101 (01/29/20 1200)  Resp: (!) 21 (01/29/20 1200)  BP: 97/66 (01/29/20 1200)  SpO2: 100 % (01/29/20 1200) Vital Signs (24h Range):  Temp:  [97.7 °F (36.5 °C)-98.7 °F (37.1 °C)] 97.9 °F (36.6 °C)  Pulse:  [] 101  Resp:  [0-49] 21  SpO2:  [93 %-100 %] 100 %  BP: ()/(50-84) 97/66     Patient Vitals for the past 72 hrs (Last 3 readings):   Weight   01/29/20 0315 82.2 kg (181 lb 3.5 oz)   01/28/20 1022 83 kg (182 lb 15.7 oz)   01/28/20 0806 83 kg (182 lb 15.7 oz)     Body mass index is 28.38 kg/m².      Intake/Output Summary (Last 24 hours) at 1/29/2020 1302  Last data filed at 1/29/2020 1200  Gross per 24 hour   Intake 1704.81 ml   Output 1943 ml   Net -238.19 ml       Hemodynamic Parameters:       Telemetry: ST  Physical Exam   Constitutional: He is oriented to person, place, and time. He appears well-developed and well-nourished.   HENT:   Head: Normocephalic and atraumatic.   Eyes: Pupils are equal, round, and reactive to light. Conjunctivae and EOM are normal.   Neck: Normal range of motion. Neck supple. JVD difficult to access given body habitus. No thyromegaly present.   Cardiovascular: Regular rhythm. Tachycardic, + S3. Grade II/VI systolic murmur LSB   Pulmonary/Chest: Effort normal and breath sounds normal.   Abdominal: Soft. Bowel sounds are normal.   Musculoskeletal: Normal range of motion. He exhibits no edema.   Neurological: He is alert and oriented to person, place, and time.   Skin: Skin is warm and dry. Capillary refill takes 2 to 3 seconds.   Psychiatric: He has a normal mood and affect. His behavior is normal. Judgment and thought content normal.       Significant Labs:  CBC:  Recent Labs   Lab 01/27/20  0409 01/28/20  0325 01/29/20  0414   WBC 11.99 11.37 11.35   RBC 4.35* 4.06* 4.14*   HGB 11.6* 10.8*  11.1*   HCT 36.2* 33.8* 34.5*   * 414* 418*   MCV 83 83 83   MCH 26.7* 26.6* 26.8*   MCHC 32.0 32.0 32.2     BNP:  Recent Labs   Lab 01/22/20  1752 01/27/20  1420   BNP 2,342* 3,095*     CMP:  Recent Labs   Lab 01/27/20  0410 01/27/20  1403 01/28/20  0325 01/29/20  0414   * 244* 236* 209*   CALCIUM 9.8 9.4 9.4 9.4   ALBUMIN 3.9  --  3.7 3.6   PROT 8.4  --  7.9 7.7   * 127* 128* 128*   K 4.1 4.6 4.3 4.5   CO2 32* 30* 30* 33*   CL 84* 84* 84* 86*   BUN 41* 44* 48* 49*   CREATININE 2.0* 2.1* 2.1* 2.2*   ALKPHOS 97  --  90 95   ALT 27  --  25 26   AST 31  --  35 32   BILITOT 1.3*  --  1.3* 1.0      Coagulation:   Recent Labs   Lab 01/23/20  0400  01/27/20  2318 01/28/20  0325 01/29/20  0414   INR 1.4*  --   --   --   --    APTT 47.2*   < > 46.5* 43.7* 46.2*    < > = values in this interval not displayed.     LDH:  No results for input(s): LDH in the last 72 hours.  Microbiology:  Microbiology Results (last 7 days)     ** No results found for the last 168 hours. **          I have reviewed all pertinent labs within the past 24 hours.    Estimated Creatinine Clearance: 38.9 mL/min (A) (based on SCr of 2.2 mg/dL (H)).    Diagnostic Results:  I have reviewed all pertinent imaging results/findings within the past 24 hours.    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 Chronic MARC - Class II-III and mild LE edema.      Hospital Course (synopsis of major diagnoses, care, treatment, and services provided during the course of the hospital stay):  -2/12 admit to CDU for diuresis with IV lasix  -IV abx   -CXR with suspected small left pleural effusion with associated left basilar atelectasis versus evolving airspace disease  -2/13 single chamber ICD implant; IV lasix decreased to BID  -2/16 add dobutamine, hold BB due  to hypotension, no ACE-I or ARB due to recent HPI hypotension  -2/17 Lasix changed to PO  -2/18 dobutamine discontinued    Admitted to [facility] on Thursday due to severe SOB for a week with associated orthopnea, MARC, and PND unable to lie flat and found to be in acute diastolic heart failure. States he normally weighs around 219 but up to 254lb on admission tonight. Says he hasn't been the most compliant in terms of fluid restriction and daily weights but has avoided salt. BNP on admission was 2375. BUN/CRT 32/1.4 He was started on IV diuretics but continued to deteriorate. Creatinine continued to increase and reached 4.3 with oliguria. He was started on CRRT for a few days and tolerated well. Renal u/s was negative for obstruction and liver u/s without findings of cirrhosis. All of this was progression of cardiorenal syndrome with liver congestion from severe volume overload. On arrival vitals stable and in no acute distress. He has obvious anasarca up to the chest. He did have uop of 500 at time of arrival also.    TTE 5/28/19  · Moderate left ventricular enlargement.  · Severely decreased left ventricular systolic function. The estimated ejection fraction is 20%  · Global hypokinetic wall motion.  · Grade III (severe) left ventricular diastolic dysfunction consistent with restrictive physiology.  · Severe left atrial enlargement.  · Moderate right ventricular enlargement.  · Low normal right ventricular systolic function.  · Mild right atrial enlargement.  · Moderate mitral regurgitation.  Mild to moderate tricuspid regurgitation    * Cardiogenic shock  -NICM; Likely Etoh induced  -Transferred from [facility] with IABP at 1:1 for further level of care. IABP removed 1/25  -Last 2D Echo 1/23/20: LVEF 20%, LVEDD 6.46 cm, RV mod dilated and mild to mod depressed, IVC 15  -RHC 1/27 on  5, Epi 0.04, Radha 20 ppm and Lasix 40 mg/hr: RA 12 (not correlating with CVP of 20 by PICC same day), PAP 38/22  (30), W 20 and CO/CI 4.11/2.06.   -Lasix decreased to 20mg/hr yesterday.  He is positive over the last 24 hours.  Creat is up to 2.2.  Dosing Tolvaptan today.   -GDMT: Continue Hyd/Isordil  - He has completed the pathway and was presented at selection 1/29/20: It was the committee decision to decline the pt for transplant listing due to debility, poor renal function and lack of backup caregiver. Pt is deferred for LVAD at this time pending improvement in renal function and increase in physical activity.   -Getting upper extremity ultrasound to see if candidate for axillary IABP      ANJELICA (acute kidney injury)  -Creatinine 2.2 today. Was 3.0 on admit and got as high as 4.3 at OSH prior to transfer. Renal function was normal 8 months ago.  -Decreasing Lasix did not change creat much.  May need IABP for afterload reduction.  SVR elevated and BP limits what can be done medically  -Cont to monitor with diuresis     AICD (automatic cardioverter/defibrillator) present  -Medtronic single chamber ICD placed 2019 for primary prevention    Essential hypertension  -Currently essentially normotensive   -Continue Hyd/Isordil    Deep vein thrombosis (DVT) of right lower extremity  -Unsure of timing but was on eliquis PTA.   -Continue on heparin gtt for now      Acute hyperglycemia  -HgA1C 6.6  -Endocrine following    Moderate malnutrition  - Boost glucose control added 1/27   - Prealbumin is 23    Morbid obesity  -Weight down 79# since admit with diuresis, making BMI now 29.4    Uninterrupted Critical Care/Counseling Time (not including procedures): 65 minutes      CONG Peterson  Heart Transplant  [facility]

## 2020-01-29 NOTE — COMMITTEE REVIEW
Native Organ Dx: NICM    Denied     Pt's case presented to Selection Committee 1/29/20. It was the committee decision to decline the pt for transplant listing due to debility, poor renal function and lack of backup caregiver.     Pt is deferred for LVAD at this time pending improvement in renal function and increase in physical activity.     Pt is informed of this decision by the inpatient team.     Note was written by Kami Zurita.    ==========================================================    I was present at the meeting and attest to the decision of the committee  Referring notfiied by mail

## 2020-01-30 LAB
ALBUMIN SERPL BCP-MCNC: 3.6 G/DL (ref 3.5–5.2)
ALLENS TEST: ABNORMAL
ALLENS TEST: ABNORMAL
ALP SERPL-CCNC: 94 U/L (ref 55–135)
ALT SERPL W/O P-5'-P-CCNC: 24 U/L (ref 10–44)
ANION GAP SERPL CALC-SCNC: 12 MMOL/L (ref 8–16)
APTT BLDCRRT: 37.3 SEC (ref 21–32)
APTT BLDCRRT: 46.5 SEC (ref 21–32)
APTT BLDCRRT: 61 SEC (ref 21–32)
AST SERPL-CCNC: 28 U/L (ref 10–40)
BASOPHILS # BLD AUTO: 0.05 K/UL (ref 0–0.2)
BASOPHILS NFR BLD: 0.5 % (ref 0–1.9)
BILIRUB SERPL-MCNC: 1.1 MG/DL (ref 0.1–1)
BUN SERPL-MCNC: 45 MG/DL (ref 6–20)
CALCIUM SERPL-MCNC: 8.9 MG/DL (ref 8.7–10.5)
CHLORIDE SERPL-SCNC: 87 MMOL/L (ref 95–110)
CO2 SERPL-SCNC: 29 MMOL/L (ref 23–29)
CREAT SERPL-MCNC: 1.7 MG/DL (ref 0.5–1.4)
DELSYS: ABNORMAL
DIFFERENTIAL METHOD: ABNORMAL
DIGOXIN SERPL-MCNC: <0.1 NG/ML (ref 0.8–2)
EOSINOPHIL # BLD AUTO: 0.1 K/UL (ref 0–0.5)
EOSINOPHIL NFR BLD: 1.1 % (ref 0–8)
ERYTHROCYTE [DISTWIDTH] IN BLOOD BY AUTOMATED COUNT: 18 % (ref 11.5–14.5)
EST. GFR  (AFRICAN AMERICAN): 51.3 ML/MIN/1.73 M^2
EST. GFR  (NON AFRICAN AMERICAN): 44.4 ML/MIN/1.73 M^2
FLOW: 4
GLUCOSE SERPL-MCNC: 244 MG/DL (ref 70–110)
HCO3 UR-SCNC: 33.1 MMOL/L (ref 24–28)
HCO3 UR-SCNC: 34.3 MMOL/L (ref 24–28)
HCT VFR BLD AUTO: 33.4 % (ref 40–54)
HGB BLD-MCNC: 10.7 G/DL (ref 14–18)
IMM GRANULOCYTES # BLD AUTO: 0.08 K/UL (ref 0–0.04)
IMM GRANULOCYTES NFR BLD AUTO: 0.8 % (ref 0–0.5)
LYMPHOCYTES # BLD AUTO: 1.7 K/UL (ref 1–4.8)
LYMPHOCYTES NFR BLD: 16.4 % (ref 18–48)
MAGNESIUM SERPL-MCNC: 2.2 MG/DL (ref 1.6–2.6)
MCH RBC QN AUTO: 26.6 PG (ref 27–31)
MCHC RBC AUTO-ENTMCNC: 32 G/DL (ref 32–36)
MCV RBC AUTO: 83 FL (ref 82–98)
METHEMOGLOBIN: 0.2 % (ref 0–3)
MODE: ABNORMAL
MONOCYTES # BLD AUTO: 0.5 K/UL (ref 0.3–1)
MONOCYTES NFR BLD: 4.9 % (ref 4–15)
NEUTROPHILS # BLD AUTO: 8.1 K/UL (ref 1.8–7.7)
NEUTROPHILS NFR BLD: 76.3 % (ref 38–73)
NRBC BLD-RTO: 0 /100 WBC
PCO2 BLDA: 46.7 MMHG (ref 35–45)
PCO2 BLDA: 48.1 MMHG (ref 35–45)
PH SMN: 7.46 [PH] (ref 7.35–7.45)
PH SMN: 7.46 [PH] (ref 7.35–7.45)
PLATELET # BLD AUTO: 416 K/UL (ref 150–350)
PMV BLD AUTO: 10.4 FL (ref 9.2–12.9)
PO2 BLDA: 25 MMHG (ref 40–60)
PO2 BLDA: 30 MMHG (ref 40–60)
POC BE: 10 MMOL/L
POC BE: 9 MMOL/L
POC SATURATED O2: 48 % (ref 95–100)
POC SATURATED O2: 60 % (ref 95–100)
POC TCO2: 34 MMOL/L (ref 24–29)
POC TCO2: 36 MMOL/L (ref 24–29)
POCT GLUCOSE: 145 MG/DL (ref 70–110)
POCT GLUCOSE: 232 MG/DL (ref 70–110)
POCT GLUCOSE: 239 MG/DL (ref 70–110)
POCT GLUCOSE: 246 MG/DL (ref 70–110)
POTASSIUM SERPL-SCNC: 3.7 MMOL/L (ref 3.5–5.1)
POTASSIUM SERPL-SCNC: 3.7 MMOL/L (ref 3.5–5.1)
PROT SERPL-MCNC: 7.5 G/DL (ref 6–8.4)
RBC # BLD AUTO: 4.03 M/UL (ref 4.6–6.2)
SAMPLE: ABNORMAL
SAMPLE: ABNORMAL
SITE: ABNORMAL
SITE: ABNORMAL
SODIUM SERPL-SCNC: 126 MMOL/L (ref 136–145)
SODIUM SERPL-SCNC: 127 MMOL/L (ref 136–145)
SODIUM SERPL-SCNC: 128 MMOL/L (ref 136–145)
SODIUM SERPL-SCNC: 128 MMOL/L (ref 136–145)
SODIUM SERPL-SCNC: 130 MMOL/L (ref 136–145)
SODIUM SERPL-SCNC: 130 MMOL/L (ref 136–145)
SODIUM SERPL-SCNC: 131 MMOL/L (ref 136–145)
SP02: 100
WBC # BLD AUTO: 10.58 K/UL (ref 3.9–12.7)

## 2020-01-30 PROCEDURE — 94761 N-INVAS EAR/PLS OXIMETRY MLT: CPT

## 2020-01-30 PROCEDURE — 85730 THROMBOPLASTIN TIME PARTIAL: CPT | Mod: 91

## 2020-01-30 PROCEDURE — 99232 SBSQ HOSP IP/OBS MODERATE 35: CPT | Mod: ,,, | Performed by: NURSE PRACTITIONER

## 2020-01-30 PROCEDURE — 82803 BLOOD GASES ANY COMBINATION: CPT

## 2020-01-30 PROCEDURE — 63600175 PHARM REV CODE 636 W HCPCS: Performed by: STUDENT IN AN ORGANIZED HEALTH CARE EDUCATION/TRAINING PROGRAM

## 2020-01-30 PROCEDURE — 97116 GAIT TRAINING THERAPY: CPT

## 2020-01-30 PROCEDURE — 80053 COMPREHEN METABOLIC PANEL: CPT

## 2020-01-30 PROCEDURE — 99291 PR CRITICAL CARE, E/M 30-74 MINUTES: ICD-10-PCS | Mod: ,,, | Performed by: NURSE PRACTITIONER

## 2020-01-30 PROCEDURE — 99232 PR SUBSEQUENT HOSPITAL CARE,LEVL II: ICD-10-PCS | Mod: ,,, | Performed by: NURSE PRACTITIONER

## 2020-01-30 PROCEDURE — 99900035 HC TECH TIME PER 15 MIN (STAT)

## 2020-01-30 PROCEDURE — 25000003 PHARM REV CODE 250: Performed by: INTERNAL MEDICINE

## 2020-01-30 PROCEDURE — 63600175 PHARM REV CODE 636 W HCPCS: Performed by: NURSE PRACTITIONER

## 2020-01-30 PROCEDURE — 63600367 HC NITRIC OXIDE PER HOUR

## 2020-01-30 PROCEDURE — 25000003 PHARM REV CODE 250: Performed by: HOSPITALIST

## 2020-01-30 PROCEDURE — 25000003 PHARM REV CODE 250: Performed by: STUDENT IN AN ORGANIZED HEALTH CARE EDUCATION/TRAINING PROGRAM

## 2020-01-30 PROCEDURE — 20000000 HC ICU ROOM

## 2020-01-30 PROCEDURE — 80162 ASSAY OF DIGOXIN TOTAL: CPT

## 2020-01-30 PROCEDURE — 27200188 HC TRANSDUCER, ART ADULT/PEDS

## 2020-01-30 PROCEDURE — 85025 COMPLETE CBC W/AUTO DIFF WBC: CPT

## 2020-01-30 PROCEDURE — 84295 ASSAY OF SERUM SODIUM: CPT | Mod: 91

## 2020-01-30 PROCEDURE — 99291 CRITICAL CARE FIRST HOUR: CPT | Mod: ,,, | Performed by: NURSE PRACTITIONER

## 2020-01-30 PROCEDURE — 85730 THROMBOPLASTIN TIME PARTIAL: CPT

## 2020-01-30 PROCEDURE — 63600175 PHARM REV CODE 636 W HCPCS: Performed by: HOSPITALIST

## 2020-01-30 PROCEDURE — 83735 ASSAY OF MAGNESIUM: CPT

## 2020-01-30 PROCEDURE — 84295 ASSAY OF SERUM SODIUM: CPT

## 2020-01-30 PROCEDURE — 25000003 PHARM REV CODE 250: Performed by: NURSE PRACTITIONER

## 2020-01-30 PROCEDURE — 27000221 HC OXYGEN, UP TO 24 HOURS

## 2020-01-30 PROCEDURE — 83050 HGB METHEMOGLOBIN QUAN: CPT

## 2020-01-30 RX ORDER — POTASSIUM CHLORIDE 20 MEQ/1
20 TABLET, EXTENDED RELEASE ORAL ONCE
Status: COMPLETED | OUTPATIENT
Start: 2020-01-30 | End: 2020-01-30

## 2020-01-30 RX ORDER — TOLVAPTAN 15 MG/1
15 TABLET ORAL ONCE
Status: COMPLETED | OUTPATIENT
Start: 2020-01-30 | End: 2020-01-30

## 2020-01-30 RX ADMIN — ACETAMINOPHEN 1000 MG: 500 TABLET ORAL at 09:01

## 2020-01-30 RX ADMIN — EPINEPHRINE 0.04 MCG/KG/MIN: 1 INJECTION PARENTERAL at 03:01

## 2020-01-30 RX ADMIN — CETIRIZINE HYDROCHLORIDE 10 MG: 10 TABLET, FILM COATED ORAL at 08:01

## 2020-01-30 RX ADMIN — INSULIN ASPART 2 UNITS: 100 INJECTION, SOLUTION INTRAVENOUS; SUBCUTANEOUS at 12:01

## 2020-01-30 RX ADMIN — ATORVASTATIN CALCIUM 40 MG: 20 TABLET, FILM COATED ORAL at 08:01

## 2020-01-30 RX ADMIN — ACETAMINOPHEN 1000 MG: 500 TABLET ORAL at 06:01

## 2020-01-30 RX ADMIN — ISOSORBIDE DINITRATE 10 MG: 10 TABLET ORAL at 06:01

## 2020-01-30 RX ADMIN — ISOSORBIDE DINITRATE 10 MG: 10 TABLET ORAL at 09:01

## 2020-01-30 RX ADMIN — ISOSORBIDE DINITRATE 10 MG: 10 TABLET ORAL at 03:01

## 2020-01-30 RX ADMIN — FUROSEMIDE 20 MG/HR: 10 INJECTION, SOLUTION INTRAMUSCULAR; INTRAVENOUS at 12:01

## 2020-01-30 RX ADMIN — Medication 800 MG: at 09:01

## 2020-01-30 RX ADMIN — HEPARIN SODIUM AND DEXTROSE 17 UNITS/KG/HR: 10000; 5 INJECTION INTRAVENOUS at 09:01

## 2020-01-30 RX ADMIN — HYDRALAZINE HYDROCHLORIDE 50 MG: 50 TABLET ORAL at 03:01

## 2020-01-30 RX ADMIN — STANDARDIZED SENNA CONCENTRATE AND DOCUSATE SODIUM 2 TABLET: 8.6; 5 TABLET ORAL at 09:01

## 2020-01-30 RX ADMIN — INSULIN ASPART 2 UNITS: 100 INJECTION, SOLUTION INTRAVENOUS; SUBCUTANEOUS at 08:01

## 2020-01-30 RX ADMIN — TRIAMCINOLONE ACETONIDE: 5 CREAM TOPICAL at 09:01

## 2020-01-30 RX ADMIN — Medication 800 MG: at 08:01

## 2020-01-30 RX ADMIN — ACETAMINOPHEN 1000 MG: 500 TABLET ORAL at 03:01

## 2020-01-30 RX ADMIN — POTASSIUM CHLORIDE 20 MEQ: 1500 TABLET, EXTENDED RELEASE ORAL at 06:01

## 2020-01-30 RX ADMIN — HYDRALAZINE HYDROCHLORIDE 50 MG: 50 TABLET ORAL at 09:01

## 2020-01-30 RX ADMIN — TOLVAPTAN 15 MG: 15 TABLET ORAL at 09:01

## 2020-01-30 RX ADMIN — TRIAMCINOLONE ACETONIDE: 5 CREAM TOPICAL at 03:01

## 2020-01-30 RX ADMIN — FAMOTIDINE 20 MG: 40 POWDER, FOR SUSPENSION ORAL at 08:01

## 2020-01-30 RX ADMIN — TRIAMCINOLONE ACETONIDE: 5 CREAM TOPICAL at 08:01

## 2020-01-30 RX ADMIN — Medication 6 MG: at 09:01

## 2020-01-30 RX ADMIN — INSULIN ASPART 2 UNITS: 100 INJECTION, SOLUTION INTRAVENOUS; SUBCUTANEOUS at 05:01

## 2020-01-30 RX ADMIN — HYDRALAZINE HYDROCHLORIDE 50 MG: 50 TABLET ORAL at 06:01

## 2020-01-30 RX ADMIN — HEPARIN SODIUM AND DEXTROSE 17 UNITS/KG/HR: 10000; 5 INJECTION INTRAVENOUS at 05:01

## 2020-01-30 NOTE — PROGRESS NOTES
"[facility]  Endocrinology  Progress Note    Admit Date: 1/15/2020     Reason for Consult: Management of  Hyperglycemia     Surgical Procedure and Date:    ICD implantation 02/15/2019      HPI:   Patient is a 54 y.o. 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 Chronic MARC - Class II-III and mild LE edema.  Admitted to [facility] on Thursday due to severe SOB for a week with associated orthopnea, MARC, and PND unable to lie flat and found to be in acute diastolic heart failure.He was started on CRRT for a few days and tolerated well. Renal u/s was negative for obstruction and liver u/s without findings of cirrhosis. All of this was progression of cardiorenal syndrome with liver congestion from severe volume overload. No dx of DM noted on file. Patient denies history of DM at time of consult. Endocrinology consulted to manage BG during admission to [facility].          Lab Results   Component Value Date    HGBA1C 6.6 (H) 2020       Interval HPI:   Overnight events: BG slightly above goal ranges; requiring SQ Novolog correction scale.   Eatin%  Nausea: No  Hypoglycemia and intervention: No  Fever: No  TPN and/or TF: No  If yes, type of TF/TPN and rate: none    /76 (BP Location: Right arm, Patient Position: Lying)   Pulse (!) 114   Temp 97.6 °F (36.4 °C) (Oral)   Resp (!) 22   Ht 5' 7" (1.702 m)   Wt 83.7 kg (184 lb 10.2 oz)   SpO2 98%   BMI 28.92 kg/m²      Labs Reviewed and Include    Recent Labs   Lab 20  0352  20  1155   *  --   --    CALCIUM 8.9  --   --    ALBUMIN 3.6  --   --    PROT 7.5  --   --    *  128*   < > 127*   K 3.7  3.7  --   --    CO2 29  --   --    CL 87*  --   --    BUN 45*  --   --    CREATININE 1.7*  --   --    ALKPHOS 94  --   --    ALT 24  --   --    AST 28  " --   --    BILITOT 1.1*  --   --     < > = values in this interval not displayed.     Lab Results   Component Value Date    WBC 10.58 01/30/2020    HGB 10.7 (L) 01/30/2020    HCT 33.4 (L) 01/30/2020    MCV 83 01/30/2020     (H) 01/30/2020     No results for input(s): TSH, FREET4 in the last 168 hours.  Lab Results   Component Value Date    HGBA1C 6.6 (H) 01/16/2020       Nutritional status:   Body mass index is 28.92 kg/m².  Lab Results   Component Value Date    ALBUMIN 3.6 01/30/2020    ALBUMIN 3.6 01/29/2020    ALBUMIN 3.6 01/29/2020     Lab Results   Component Value Date    PREALBUMIN 23 01/28/2020    PREALBUMIN 15 (L) 01/22/2020    PREALBUMIN 17 (L) 07/23/2012       Estimated Creatinine Clearance: 50.8 mL/min (A) (based on SCr of 1.7 mg/dL (H)).    Accu-Checks  Recent Labs     01/27/20  1718 01/28/20  0804 01/28/20  1143 01/28/20  2339 01/29/20  0800 01/29/20  1212 01/29/20  1653 01/29/20  2211 01/30/20  0749 01/30/20  1201   POCTGLUCOSE 402* 123* 187* 120* 133* 143* 140* 170* 232* 246*       Current Medications and/or Treatments Impacting Glycemic Control  Immunotherapy:    Immunosuppressants     None        Steroids:   Hormones (From admission, onward)    Start     Stop Route Frequency Ordered    01/17/20 2100  melatonin tablet 6 mg      -- Oral Nightly 01/17/20 1002        Pressors:    Autonomic Drugs (From admission, onward)    Start     Stop Route Frequency Ordered    01/26/20 1745  EPINEPHrine (ADRENALIN) 5 mg in sodium chloride 0.9% 250 mL infusion      -- IV Continuous 01/26/20 1639        Hyperglycemia/Diabetes Medications:   Antihyperglycemics (From admission, onward)    Start     Stop Route Frequency Ordered    01/23/20 1019  insulin aspart U-100 pen 0-5 Units      -- SubQ Before meals & nightly PRN 01/23/20 0919          ASSESSMENT and PLAN    * Cardiogenic shock  Managed per primary team  Avoid hypoglycemia        Acute hyperglycemia  BG goal 140 - 180     BG is slightly elevated today  With  SQ Novolog correction scale  Continue Low Dose SQ Insulin Correction Scale PRN BG excursions.   BG Monitoring AC/HS     Will monitor for prandial excursions and will consider starting scheduled Novolog     ** Please call Endocrine for any BG related issues **  ** Please notify Endocrine for any change and/or advance in diet**    Discharge Planning:    TBD. Please notify endocrinology prior to discharge.         Essential hypertension  Managed per primary team  Condition may cause insulin resistance         ANJELICA (acute kidney injury)  Titrate insulin slowly to avoid hypoglycemia as the risk of hypoglycemia increases with decreased creatinine clearance.          Morbid obesity  Body mass index is 28.92 kg/m².  may contribute to insulin resistance            Massiel Paz NP  Endocrinology  [facility]

## 2020-01-30 NOTE — PROGRESS NOTES
[facility]  Heart Transplant  Progress Note    Patient Name: Saba Lott  MRN: [id number]  Admission Date: 1/15/2020  Hospital Length of Stay: 15 days  Attending Physician: Lian Daniel MD  Primary Care Provider: Primary Doctor No  Principal Problem:Cardiogenic shock    Subjective:     **Interval History: No complaints or overnight events. Creatinine has trended down to 1.7. Na+ still 128 after getting Tolvaptan yesterday. CVP via PICC did not correlate with RHC numbers from 1/27/2020. sVO2 60 with calculated CO 5.07. Prealbumin on 1/28 was 23, and he is eating double portions. [place] with HHA but no assistive devices.     Continuous Infusions:   DOBUTamine 5 mcg/kg/min (01/30/20 0700)    epinephrine infustion (NON-TITRATING) 0.04 mcg/kg/min (01/30/20 0700)    furosemide (LASIX) 10 mg/mL infusion (non-titrating) 20 mg/hr (01/30/20 0700)    heparin (porcine) in D5W 17 Units/kg/hr (01/30/20 0700)    nitric oxide gas       Scheduled Meds:   acetaminophen  1,000 mg Oral Q8H    atorvastatin  40 mg Oral Daily    cetirizine  10 mg Oral Daily    famotidine  20 mg Oral Daily    hydrALAZINE  50 mg Oral Q8H    isosorbide dinitrate  10 mg Oral Q8H    magnesium oxide  800 mg Oral BID    magnesium sulfate IVPB  1 g Intravenous Once    melatonin  6 mg Oral Nightly    polyethylene glycol  17 g Oral Daily    senna-docusate 8.6-50 mg  2 tablet Oral BID    tolvaptan  15 mg Oral Once    triamcinolone acetonide 0.5%   Topical (Top) TID     PRN Meds:albuterol, bisacodyl, Dextrose 10% Bolus, Dextrose 10% Bolus, glucagon (human recombinant), glucose, glucose, heparin (PORCINE), heparin (PORCINE), hepatitis B, hydrOXYzine HCl, insulin aspart U-100, pneumococcal vaccine, sodium chloride 0.9%, traMADol    Review of patient's allergies indicates:   Allergen Reactions    Iodine and iodide containing products      Objective:     Vital Signs (Most Recent):  Temp: 97.9 °F (36.6 °C) (01/30/20  0700)  Pulse: 108 (01/30/20 0745)  Resp: 18 (01/30/20 0745)  BP: 104/63 (01/30/20 0700)  SpO2: 96 % (01/30/20 0745) Vital Signs (24h Range):  Temp:  [97.5 °F (36.4 °C)-97.9 °F (36.6 °C)] 97.9 °F (36.6 °C)  Pulse:  [100-114] 108  Resp:  [16-46] 18  SpO2:  [90 %-100 %] 96 %  BP: ()/(50-80) 104/63     Patient Vitals for the past 72 hrs (Last 3 readings):   Weight   01/30/20 0300 83.7 kg (184 lb 10.2 oz)   01/29/20 0315 82.2 kg (181 lb 3.5 oz)   01/28/20 1022 83 kg (182 lb 15.7 oz)     Body mass index is 28.92 kg/m².      Intake/Output Summary (Last 24 hours) at 1/30/2020 0822  Last data filed at 1/30/2020 0700  Gross per 24 hour   Intake 3008.44 ml   Output 4715 ml   Net -1706.56 ml       Hemodynamic Parameters:       Telemetry: ST    Physical Exam   Constitutional: He is oriented to person, place, and time. He appears well-developed and well-nourished.   HENT:   Head: Normocephalic and atraumatic.   Eyes: Pupils are equal, round, and reactive to light. Conjunctivae and EOM are normal.   Neck: Normal range of motion. Neck supple. No JVD present. No thyromegaly present.   Cardiovascular: Regular rhythm.   Tachycardic, + S3. Grade II/VI systolic murmur LSB   Pulmonary/Chest: Effort normal and breath sounds normal.   Abdominal: Soft. Bowel sounds are normal.   Musculoskeletal: Normal range of motion. He exhibits no edema.   Neurological: He is alert and oriented to person, place, and time.   Skin: Skin is warm and dry. Capillary refill takes 2 to 3 seconds.   Psychiatric: He has a normal mood and affect. His behavior is normal. Judgment and thought content normal.       Significant Labs:  CBC:  Recent Labs   Lab 01/28/20  0325 01/29/20  0414 01/30/20  0352   WBC 11.37 11.35 10.58   RBC 4.06* 4.14* 4.03*   HGB 10.8* 11.1* 10.7*   HCT 33.8* 34.5* 33.4*   * 418* 416*   MCV 83 83 83   MCH 26.6* 26.8* 26.6*   MCHC 32.0 32.2 32.0     BNP:  Recent Labs   Lab 01/27/20  1420   BNP 3,095*     CMP:  Recent Labs   Lab  01/29/20  0414  01/29/20  1414 01/29/20  1834 01/30/20  0050 01/30/20  0352   *  --  163*  --   --  244*   CALCIUM 9.4  --  9.8  --   --  8.9   ALBUMIN 3.6  --  3.6  --   --  3.6   PROT 7.7  --  8.2  --   --  7.5   *   < > 130* 128* 126* 128*  128*   K 4.5  --  4.5  --   --  3.7  3.7   CO2 33*  --  32*  --   --  29   CL 86*  --  86*  --   --  87*   BUN 49*  --  50*  --   --  45*   CREATININE 2.2*  --  2.1*  --   --  1.7*   ALKPHOS 95  --  90  --   --  94   ALT 26  --  24  --   --  24   AST 32  --  31  --   --  28   BILITOT 1.0  --  1.2*  --   --  1.1*    < > = values in this interval not displayed.      Coagulation:   Recent Labs   Lab 01/28/20  0325 01/29/20  0414 01/30/20  0352   APTT 43.7* 46.2* 37.3*     LDH:  No results for input(s): LDH in the last 72 hours.  Microbiology:  Microbiology Results (last 7 days)     ** No results found for the last 168 hours. **          I have reviewed all pertinent labs within the past 24 hours.    Estimated Creatinine Clearance: 50.8 mL/min (A) (based on SCr of 1.7 mg/dL (H)).    Diagnostic Results:  I have reviewed all pertinent imaging results/findings within the past 24 hours.    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 Chronic MARC - Class II-III and mild LE edema.      Hospital Course (synopsis of major diagnoses, care, treatment, and services provided during the course of the hospital stay):  -2/12 admit to CDU for diuresis with IV lasix  -IV abx   -CXR with suspected small left pleural effusion with associated left basilar atelectasis versus evolving airspace disease  -2/13 single chamber ICD implant; IV lasix decreased to BID  -2/16 add dobutamine, hold BB due to hypotension, no ACE-I or ARB due to recent HPI hypotension  -2/17 Lasix changed to PO  -2/18  dobutamine discontinued    Admitted to [facility] on Thursday due to severe SOB for a week with associated orthopnea, MARC, and PND unable to lie flat and found to be in acute diastolic heart failure. States he normally weighs around 219 but up to 254lb on admission tonight. Says he hasn't been the most compliant in terms of fluid restriction and daily weights but has avoided salt. BNP on admission was 2375. BUN/CRT 32/1.4 He was started on IV diuretics but continued to deteriorate. Creatinine continued to increase and reached 4.3 with oliguria. He was started on CRRT for a few days and tolerated well. Renal u/s was negative for obstruction and liver u/s without findings of cirrhosis. All of this was progression of cardiorenal syndrome with liver congestion from severe volume overload. On arrival vitals stable and in no acute distress. He has obvious anasarca up to the chest. He did have uop of 500 at time of arrival also.    TTE 5/28/19  · Moderate left ventricular enlargement.  · Severely decreased left ventricular systolic function. The estimated ejection fraction is 20%  · Global hypokinetic wall motion.  · Grade III (severe) left ventricular diastolic dysfunction consistent with restrictive physiology.  · Severe left atrial enlargement.  · Moderate right ventricular enlargement.  · Low normal right ventricular systolic function.  · Mild right atrial enlargement.  · Moderate mitral regurgitation.  Mild to moderate tricuspid regurgitation    * Cardiogenic shock  -NICM; Likely Etoh induced  -Transferred from [facility] with IABP at 1:1 for further level of care. IABP removed 1/25  -Last 2D Echo 1/23/20: LVEF 20%, LVEDD 6.46 cm, RV mod dilated and mild to mod depressed, IVC 15  -RHC 1/27 on  5, Epi 0.04, Radha 20 ppm and Lasix 40 mg/hr: RA 12 (not correlating with CVP of 20 by PICC same day), PAP 38/22 (30), W 20 and CO/CI 4.11/2.06.   -sVO2 60 with calculated CO 5.07 on  5, Lasix 20, Radha 20 and  Epi 0.04  -Net -ve 1.2L past 24 hours on Lasix 20 mg/hr plus 15 mg Tolvaptan yesterday. Creat is down to 1.7.  Dosing Tolvaptan today as Na+ remains 128.   -GDMT: Continue Hyd/Isordil  - He has completed the pathway and was presented at selection 1/29/20: It was the committee decision to decline the pt for transplant listing due to debility, poor renal function and lack of backup caregiver. Pt is deferred for LVAD at this time pending improvement in renal function and increase in physical activity.       Acute hyperglycemia  -HgA1C 6.6  -Endocrine following    Moderate malnutrition  - Boost glucose control added 1/27   - Prealbumin is 23    Morbid obesity  -Weight down 79# since admit with diuresis, making BMI now 29.7    ANJELICA (acute kidney injury)  -Creatinine is down to 1.7 today. Was 3.0 on admit and got as high as 4.3 at OSH prior to transfer. Renal function was normal 8 months ago.  -Cont to monitor with diuresis     Essential hypertension  -Currently essentially normotensive   -Continue Hyd/Isordil    Deep vein thrombosis (DVT) of right lower extremity  -Unsure of timing but was on eliquis PTA.   -Continue on heparin gtt for now      AICD (automatic cardioverter/defibrillator) present  -Medtronic single chamber ICD placed 2019 for primary prevention      Uninterrupted Critical Care/Counseling Time (not including procedures): 30 minutes      Mallika Lugo NP [id number]  Heart Transplant  [facility]

## 2020-01-30 NOTE — PHYSICIAN QUERY
PT Name: Saba Lott  MR #: [id number]     PHYSICIAN QUERY -  ELECTROLYTE CLARIFICATION      CDS/: Tisha Ferguson RN, CCDS              Contact information: [email or web address]  This form is a permanent document in the medical record.     Query Date: January 30, 2020    By submitting this query, we are merely seeking further clarification of documentation to reflect the severity of illness of your patient. Please utilize your independent clinical judgment when addressing the question(s) below.    The Medical record reflects the following:     Indicators   Supporting Clinical Findings Location in Medical Record   X Lab Value(s) Na 127-->128-->128-->126 1/27--1/30 lab   X Treatment                                 Medication Tolvaptan ordered for today as Na is 128    Added tolvaptan to help with diuresis- improved Na and Cr this am, will redose 1/29 prog note      1/30 prog note    Other       Provider, please specify the diagnosis or diagnoses that correspond(s) to the above indicators.     [ x  ] Hyponatremia   [   ] Other electrolyte disturbance (please specify): _______   [   ]  Clinically Undetermined       Please document in your progress notes daily for the duration of treatment until resolved, and include in your discharge summary.

## 2020-01-30 NOTE — ASSESSMENT & PLAN NOTE
-NICM; Likely Etoh induced  -Transferred from [facility] with IABP at 1:1 for further level of care. IABP removed 1/25  -Last 2D Echo 1/23/20: LVEF 20%, LVEDD 6.46 cm, RV mod dilated and mild to mod depressed, IVC 15  -RHC 1/27 on  5, Epi 0.04, Radha 20 ppm and Lasix 40 mg/hr: RA 12 (not correlating with CVP of 20 by PICC same day), PAP 38/22 (30), W 20 and CO/CI 4.11/2.06.   -sVO2 60 with calculated CO 5.07 on  5, Lasix 20, Radha 20 and Epi 0.04  -Net -ve 1.2L past 24 hours on Lasix 20 mg/hr plus 15 mg Tolvaptan yesterday. Creat is down to 1.7.  Dosing Tolvaptan today as Na+ remains 128.   -GDMT: Continue Hyd/Isordil  - He has completed the pathway and was presented at selection 1/29/20: It was the committee decision to decline the pt for transplant listing due to debility, poor renal function and lack of backup caregiver. Pt is deferred for LVAD at this time pending improvement in renal function and increase in physical activity.

## 2020-01-30 NOTE — PLAN OF CARE
No acute events during the day. Pt AAOx4. 4L NC Nitric @ 20ppm. ST 110s. BP WNL. CVP elevated 19-20. Tolvaptan given x1. Good UO. One BM. US to UE completed for possible axillary IABP. Gtts. Lasix, , KVO, Heparin. POC LVAD currently deferred until improvement in renal function and physical activity. All concerns addressed. [facility] [place]          CMICU DAILY GOALS       A: Awake    RASS: Goal - RASS Goal: 0-->alert and calm  Actual - RASS (Mcmillan Agitation-Sedation Scale): 0-->alert and calm   Restraint necessity: Clinical Justification: Removing medical devices  B: Breath   SBT: Not intubated   C: Coordinate A & B, analgesics/sedatives   Pain: managed    SAT: Not intubated  D: Delirium   CAM-ICU: Overall CAM-ICU: Negative  E: Early Mobility   MOVE Screen: Pass   Activity: Activity Management: activity adjusted per tolerance  FAS: Feeding/Nutrition   Diet order: Diet/Nutrition Received: low saturated fat/low cholesterol, 2 gram sodium,   Fluid restriction: Fluid Requirement: 1000FR  T: Thrombus   DVT prophylaxis: VTE Required Core Measure: Pharmacological prophylaxis initiated/maintained  H: HOB Elevation   Head of Bed (HOB): HOB at 30-45 degrees  U: Ulcer Prophylaxis   GI: yes  G: Glucose control   managed    S: Skin   Bundle compliance: yes   Bathing/Skin Care: back care, bath, chlorhexidine, bath, complete, incontinence care, linen changed Date: 01/29/2019    B: Bowel Function   no issues   I: Indwelling Catheters   Mc necessity:      Urethral Catheter 01/15/20 2030 Straight-tip 16 Fr.-Reason for Continuing Urinary Catheterization: Critically ill in ICU requiring intensive monitoring   CVC necessity: Yes   IPAD offered: Yes  D: De-escalation Antibx   N/a   Plan for the day   diurese  Family/Goals of care/Code Status   Code Status: Full Code     No acute events throughout day, VS and assessment per flow sheet, patient progressing towards goals as tolerated, plan of care reviewed with Saba Lott and family, all  concerns addressed, will continue to monitor.

## 2020-01-30 NOTE — PT/OT/SLP PROGRESS
Physical Therapy Treatment    Patient Name:  Saba Lott   MRN:  [id number]    Recommendations:     Discharge Recommendations:  outpatient PT   Discharge Equipment Recommendations: none   Barriers to discharge: None    Assessment:     Saba Lott is a 54 y.o. male admitted with a medical diagnosis of Cardiogenic shock.  He presents with the following impairments/functional limitations:  gait instability, weakness, impaired endurance, impaired balance, decreased lower extremity function, impaired functional mobilty pt krista treatment better being able to gait train farther. Pt will benefit from cont skilled PT 4x/wk to progress physically. Pt should be able to discharge home with OPPT.     Rehab Prognosis: Good; patient would benefit from acute skilled PT services to address these deficits and reach maximum level of function.    Recent Surgery: Procedure(s) (LRB):  INSERTION, CATHETER, RIGHT HEART (Right) 3 Days Post-Op    Plan:     During this hospitalization, patient to be seen 4 x/week to address the identified rehab impairments via gait training, therapeutic activities, therapeutic exercises and progress toward the following goals:    · Plan of Care Expires:  02/23/20    Subjective     Chief Complaint: pt had no complaints during treatment.   Patient/Family Comments/goals: to get better and go home.   Pain/Comfort:  · Pain Rating 1: 0/10  · Pain Rating Post-Intervention 1: 0/10      Objective:     Communicated with nurse prior to session.  Patient found supine with arterial line, blood pressure cuff, barton catheter, oxygen, telemetry, PICC line, peripheral IV upon PT entry to room.     General Precautions: Standard, fall   Orthopedic Precautions:N/A   Braces:       Functional Mobility:  · Bed Mobility:     · Rolling Right: supervision  · Supine to Sit: minimum assistance  ·   · Transfers:     · Sit to Stand:  contact guard assistance with hand-held assist  ·   · Gait: pt received gait training ~ 490 ft with CGA. RN  was present with portable monitor and respiratory therapy was present to manage nitric oxide. Pt was additional assist of 1 to follow with chair.       AM-PAC 6 CLICK MOBILITY  Turning over in bed (including adjusting bedclothes, sheets and blankets)?: 3  Sitting down on and standing up from a chair with arms (e.g., wheelchair, bedside commode, etc.): 3  Moving from lying on back to sitting on the side of the bed?: 3  Moving to and from a bed to a chair (including a wheelchair)?: 3  Need to walk in hospital room?: 3  Climbing 3-5 steps with a railing?: 3  Basic Mobility Total Score: 18         Patient left up in chair with all lines intact, call button in reach and mother present..    GOALS:   Multidisciplinary Problems     Physical Therapy Goals        Problem: Physical Therapy Goal    Goal Priority Disciplines Outcome Goal Variances Interventions   Physical Therapy Goal     PT, PT/OT Ongoing, Progressing     Description:  Goals to be met by: 2020     Patient will increase functional independence with mobility by performin. Supine to sit with Set-up [place]- met 2020  2. Sit to stand transfer with Supervision- not met  3. Gait  x 150 feet with Supervision using LRAD or no AD -not met  4. Lower extremity exercise program x10 reps per handout, with independence -not met                        Time Tracking:     PT Received On: 20  PT Start Time: 1039     PT Stop Time: 1059  PT Total Time (min): 20 min     Billable Minutes: Gait Training 20 min    Treatment Type: Treatment  PT/PTA: PT     PTA Visit Number: 0     Christa Malik, PT  2020

## 2020-01-30 NOTE — ASSESSMENT & PLAN NOTE
BG goal 140 - 180     BG is slightly elevated today  With SQ Novolog correction scale  Continue Low Dose SQ Insulin Correction Scale PRN BG excursions.   BG Monitoring AC/HS     Will monitor for prandial excursions and will consider starting scheduled Novolog     ** Please call Endocrine for any BG related issues **  ** Please notify Endocrine for any change and/or advance in diet**    Discharge Planning:    TBD. Please notify endocrinology prior to discharge.

## 2020-01-30 NOTE — PROGRESS NOTES
Pt and mother JESUS.  Talked with them about completing the community contact numbers and home inspection checklist.  Mother said she will begin to fill it out today and hopes to have it finished tomorrow.    Talked with them about the education process after VAD.  Pt has trouble reading  So I asked his mother if she will be available to read to him and help him learn.  She told me she reads, but not well.  She said there are family members at home that can help him once he is home.  I explained he will need to learn all about his VAD before he can go home.  Mother told me they will figure it out.  No questions for me today.  Further VAD education tomorrow.

## 2020-01-30 NOTE — SUBJECTIVE & OBJECTIVE
"Interval HPI:   Overnight events: BG slightly above goal ranges; requiring SQ Novolog correction scale.   Eatin%  Nausea: No  Hypoglycemia and intervention: No  Fever: No  TPN and/or TF: No  If yes, type of TF/TPN and rate: none    /76 (BP Location: Right arm, Patient Position: Lying)   Pulse (!) 114   Temp 97.6 °F (36.4 °C) (Oral)   Resp (!) 22   Ht 5' 7" (1.702 m)   Wt 83.7 kg (184 lb 10.2 oz)   SpO2 98%   BMI 28.92 kg/m²     Labs Reviewed and Include    Recent Labs   Lab 20  0352  20  1155   *  --   --    CALCIUM 8.9  --   --    ALBUMIN 3.6  --   --    PROT 7.5  --   --    *  128*   < > 127*   K 3.7  3.7  --   --    CO2 29  --   --    CL 87*  --   --    BUN 45*  --   --    CREATININE 1.7*  --   --    ALKPHOS 94  --   --    ALT 24  --   --    AST 28  --   --    BILITOT 1.1*  --   --     < > = values in this interval not displayed.     Lab Results   Component Value Date    WBC 10.58 2020    HGB 10.7 (L) 2020    HCT 33.4 (L) 2020    MCV 83 2020     (H) 2020     No results for input(s): TSH, FREET4 in the last 168 hours.  Lab Results   Component Value Date    HGBA1C 6.6 (H) 2020       Nutritional status:   Body mass index is 28.92 kg/m².  Lab Results   Component Value Date    ALBUMIN 3.6 2020    ALBUMIN 3.6 2020    ALBUMIN 3.6 2020     Lab Results   Component Value Date    PREALBUMIN 23 2020    PREALBUMIN 15 (L) 2020    PREALBUMIN 17 (L) 2012       Estimated Creatinine Clearance: 50.8 mL/min (A) (based on SCr of 1.7 mg/dL (H)).    Accu-Checks  Recent Labs     20  1718 20  0804 20  1143 20  2339 20  0800 20  1212 20  1653 20  2211 20  0749 20  1201   POCTGLUCOSE 402* 123* 187* 120* 133* 143* 140* 170* 232* 246*       Current Medications and/or Treatments Impacting Glycemic Control  Immunotherapy:    Immunosuppressants     None    "     Steroids:   Hormones (From admission, onward)    Start     Stop Route Frequency Ordered    01/17/20 2100  melatonin tablet 6 mg      -- Oral Nightly 01/17/20 1002        Pressors:    Autonomic Drugs (From admission, onward)    Start     Stop Route Frequency Ordered    01/26/20 1745  EPINEPHrine (ADRENALIN) 5 mg in sodium chloride 0.9% 250 mL infusion      -- IV Continuous 01/26/20 1639        Hyperglycemia/Diabetes Medications:   Antihyperglycemics (From admission, onward)    Start     Stop Route Frequency Ordered    01/23/20 1019  insulin aspart U-100 pen 0-5 Units      -- SubQ Before meals & nightly PRN 01/23/20 0919

## 2020-01-30 NOTE — PLAN OF CARE
Problem: Physical Therapy Goal  Goal: Physical Therapy Goal  Description  Goals to be met by: 2020     Patient will increase functional independence with mobility by performin. Supine to sit with Set-up [place]- met 2020  2. Sit to stand transfer with Supervision- not met  3. Gait  x 150 feet with Supervision using LRAD or no AD -not met  4. Lower extremity exercise program x10 reps per handout, with independence -not met       Outcome: Ongoing, Progressing   Christa Malik, PT  2020'

## 2020-01-30 NOTE — SUBJECTIVE & OBJECTIVE
**Interval History: No complaints or overnight events. Creatinine has trended down to 1.7. Na+ still 128 after getting Tolvaptan yesterday. CVP via PICC did not correlate with RHC numbers from 1/27/2020. sVO2 60 with calculated CO 5.07. Prealbumin on 1/28 was 23, and he is eating double portions. [place] with [place] but no assistive devices.     Continuous Infusions:   DOBUTamine 5 mcg/kg/min (01/30/20 0700)    epinephrine infustion (NON-TITRATING) 0.04 mcg/kg/min (01/30/20 0700)    furosemide (LASIX) 10 mg/mL infusion (non-titrating) 20 mg/hr (01/30/20 0700)    heparin (porcine) in D5W 17 Units/kg/hr (01/30/20 0700)    nitric oxide gas       Scheduled Meds:   acetaminophen  1,000 mg Oral Q8H    atorvastatin  40 mg Oral Daily    cetirizine  10 mg Oral Daily    famotidine  20 mg Oral Daily    hydrALAZINE  50 mg Oral Q8H    isosorbide dinitrate  10 mg Oral Q8H    magnesium oxide  800 mg Oral BID    magnesium sulfate IVPB  1 g Intravenous Once    melatonin  6 mg Oral Nightly    polyethylene glycol  17 g Oral Daily    senna-docusate 8.6-50 mg  2 tablet Oral BID    tolvaptan  15 mg Oral Once    triamcinolone acetonide 0.5%   Topical (Top) TID     PRN Meds:albuterol, bisacodyl, Dextrose 10% Bolus, Dextrose 10% Bolus, glucagon (human recombinant), glucose, glucose, heparin (PORCINE), heparin (PORCINE), hepatitis B, hydrOXYzine HCl, insulin aspart U-100, pneumococcal vaccine, sodium chloride 0.9%, traMADol    Review of patient's allergies indicates:   Allergen Reactions    Iodine and iodide containing products      Objective:     Vital Signs (Most Recent):  Temp: 97.9 °F (36.6 °C) (01/30/20 0700)  Pulse: 108 (01/30/20 0745)  Resp: 18 (01/30/20 0745)  BP: 104/63 (01/30/20 0700)  SpO2: 96 % (01/30/20 0745) Vital Signs (24h Range):  Temp:  [97.5 °F (36.4 °C)-97.9 °F (36.6 °C)] 97.9 °F (36.6 °C)  Pulse:  [100-114] 108  Resp:  [16-46] 18  SpO2:  [90 %-100 %] 96 %  BP: ()/(50-80) 104/63     Patient  Vitals for the past 72 hrs (Last 3 readings):   Weight   01/30/20 0300 83.7 kg (184 lb 10.2 oz)   01/29/20 0315 82.2 kg (181 lb 3.5 oz)   01/28/20 1022 83 kg (182 lb 15.7 oz)     Body mass index is 28.92 kg/m².      Intake/Output Summary (Last 24 hours) at 1/30/2020 0822  Last data filed at 1/30/2020 0700  Gross per 24 hour   Intake 3008.44 ml   Output 4715 ml   Net -1706.56 ml       Hemodynamic Parameters:       Telemetry: ST    Physical Exam   Constitutional: He is oriented to person, place, and time. He appears well-developed and well-nourished.   HENT:   Head: Normocephalic and atraumatic.   Eyes: Pupils are equal, round, and reactive to light. Conjunctivae and EOM are normal.   Neck: Normal range of motion. Neck supple. No JVD present. No thyromegaly present.   Cardiovascular: Regular rhythm.   Tachycardic, + S3. Grade II/VI systolic murmur LSB   Pulmonary/Chest: Effort normal and breath sounds normal.   Abdominal: Soft. Bowel sounds are normal.   Musculoskeletal: Normal range of motion. He exhibits no edema.   Neurological: He is alert and oriented to person, place, and time.   Skin: Skin is warm and dry. Capillary refill takes 2 to 3 seconds.   Psychiatric: He has a normal mood and affect. His behavior is normal. Judgment and thought content normal.       Significant Labs:  CBC:  Recent Labs   Lab 01/28/20  0325 01/29/20  0414 01/30/20  0352   WBC 11.37 11.35 10.58   RBC 4.06* 4.14* 4.03*   HGB 10.8* 11.1* 10.7*   HCT 33.8* 34.5* 33.4*   * 418* 416*   MCV 83 83 83   MCH 26.6* 26.8* 26.6*   MCHC 32.0 32.2 32.0     BNP:  Recent Labs   Lab 01/27/20  1420   BNP 3,095*     CMP:  Recent Labs   Lab 01/29/20  0414  01/29/20  1414 01/29/20  1834 01/30/20  0050 01/30/20  0352   *  --  163*  --   --  244*   CALCIUM 9.4  --  9.8  --   --  8.9   ALBUMIN 3.6  --  3.6  --   --  3.6   PROT 7.7  --  8.2  --   --  7.5   *   < > 130* 128* 126* 128*  128*   K 4.5  --  4.5  --   --  3.7  3.7   CO2 33*  --   32*  --   --  29   CL 86*  --  86*  --   --  87*   BUN 49*  --  50*  --   --  45*   CREATININE 2.2*  --  2.1*  --   --  1.7*   ALKPHOS 95  --  90  --   --  94   ALT 26  --  24  --   --  24   AST 32  --  31  --   --  28   BILITOT 1.0  --  1.2*  --   --  1.1*    < > = values in this interval not displayed.      Coagulation:   Recent Labs   Lab 01/28/20  0325 01/29/20  0414 01/30/20  0352   APTT 43.7* 46.2* 37.3*     LDH:  No results for input(s): LDH in the last 72 hours.  Microbiology:  Microbiology Results (last 7 days)     ** No results found for the last 168 hours. **          I have reviewed all pertinent labs within the past 24 hours.    Estimated Creatinine Clearance: 50.8 mL/min (A) (based on SCr of 1.7 mg/dL (H)).    Diagnostic Results:  I have reviewed all pertinent imaging results/findings within the past 24 hours.

## 2020-01-30 NOTE — ASSESSMENT & PLAN NOTE
-Creatinine is down to 1.7 today. Was 3.0 on admit and got as high as 4.3 at OSH prior to transfer. Renal function was normal 8 months ago.  -Cont to monitor with diuresis

## 2020-01-30 NOTE — PLAN OF CARE
CMICU DAILY GOALS       A: Awake    RASS: Goal - RASS Goal: 0-->alert and calm  Actual - RASS (Mcmillan Agitation-Sedation Scale): 0-->alert and calm   Restraint necessity: Clinical Justification: Removing medical devices  B: Breath   SBT: NA  C: Coordinate A & B, analgesics/sedatives   Pain: managed    SAT: NA  D: Delirium   CAM-ICU: Overall CAM-ICU: Negative  E: Early Mobility   MOVE Screen: Pass   Activity: Activity Management: activity adjusted per tolerance  FAS: Feeding/Nutrition   Diet order: Diet/Nutrition Received: consistent carb/diabetic diet,   Fluid restriction: Fluid Requirement: 1000FR  T: Thrombus   DVT prophylaxis: VTE Required Core Measure: Pharmacological prophylaxis initiated/maintained  H: HOB Elevation   Head of Bed (HOB): HOB at 30-45 degrees  U: Ulcer Prophylaxis   GI: yes  G: Glucose control   managed    S: Skin   Bundle compliance: yes   Bathing/Skin Care: back care, bath, chlorhexidine, bath, complete, incontinence care, linen changed Date: 01/29/20 (AM shift)  B: Bowel Function   no issues   I: Indwelling Catheters   Mc necessity:      Urethral Catheter 01/15/20 2030 Straight-tip 16 Fr.-Reason for Continuing Urinary Catheterization: Critically ill in ICU requiring intensive monitoring   CVC necessity: Yes   IPAD offered: Yes  D: De-escalation Antibx   No  Plan for the day   Monitor kidney function, continue to diurese  Family/Goals of care/Code Status   Code Status: Full Code     No acute events throughout day, VS and assessment per flow sheet, patient progressing towards goals as tolerated, plan of care reviewed with Saba Lott and family, all concerns addressed, will continue to monitor.

## 2020-01-31 DIAGNOSIS — I50.23 SYSTOLIC DYSFUNCTION WITH ACUTE ON CHRONIC HEART FAILURE: Primary | ICD-10-CM

## 2020-01-31 LAB
ABO + RH BLD: NORMAL
ALBUMIN SERPL BCP-MCNC: 3.7 G/DL (ref 3.5–5.2)
ALLENS TEST: ABNORMAL
ALP SERPL-CCNC: 98 U/L (ref 55–135)
ALT SERPL W/O P-5'-P-CCNC: 23 U/L (ref 10–44)
ANION GAP SERPL CALC-SCNC: 12 MMOL/L (ref 8–16)
APTT BLDCRRT: 54.4 SEC (ref 21–32)
AST SERPL-CCNC: 25 U/L (ref 10–40)
BASOPHILS # BLD AUTO: 0.04 K/UL (ref 0–0.2)
BASOPHILS NFR BLD: 0.4 % (ref 0–1.9)
BILIRUB SERPL-MCNC: 1.1 MG/DL (ref 0.1–1)
BLD GP AB SCN CELLS X3 SERPL QL: NORMAL
BUN SERPL-MCNC: 42 MG/DL (ref 6–20)
CALCIUM SERPL-MCNC: 9.2 MG/DL (ref 8.7–10.5)
CHLORIDE SERPL-SCNC: 89 MMOL/L (ref 95–110)
CO2 SERPL-SCNC: 28 MMOL/L (ref 23–29)
CREAT SERPL-MCNC: 1.6 MG/DL (ref 0.5–1.4)
DELSYS: ABNORMAL
DIFFERENTIAL METHOD: ABNORMAL
DIGOXIN SERPL-MCNC: <0.1 NG/ML (ref 0.8–2)
EOSINOPHIL # BLD AUTO: 0.1 K/UL (ref 0–0.5)
EOSINOPHIL NFR BLD: 1.2 % (ref 0–8)
ERYTHROCYTE [DISTWIDTH] IN BLOOD BY AUTOMATED COUNT: 18.1 % (ref 11.5–14.5)
EST. GFR  (AFRICAN AMERICAN): 55.2 ML/MIN/1.73 M^2
EST. GFR  (NON AFRICAN AMERICAN): 47.8 ML/MIN/1.73 M^2
GLUCOSE SERPL-MCNC: 183 MG/DL (ref 70–110)
HCO3 UR-SCNC: 33.8 MMOL/L (ref 24–28)
HCT VFR BLD AUTO: 33.7 % (ref 40–54)
HGB BLD-MCNC: 10.7 G/DL (ref 14–18)
IMM GRANULOCYTES # BLD AUTO: 0.09 K/UL (ref 0–0.04)
IMM GRANULOCYTES NFR BLD AUTO: 0.8 % (ref 0–0.5)
LYMPHOCYTES # BLD AUTO: 1.9 K/UL (ref 1–4.8)
LYMPHOCYTES NFR BLD: 17.1 % (ref 18–48)
MAGNESIUM SERPL-MCNC: 2.4 MG/DL (ref 1.6–2.6)
MCH RBC QN AUTO: 27 PG (ref 27–31)
MCHC RBC AUTO-ENTMCNC: 31.8 G/DL (ref 32–36)
MCV RBC AUTO: 85 FL (ref 82–98)
METHEMOGLOBIN: 0.4 % (ref 0–3)
MONOCYTES # BLD AUTO: 0.6 K/UL (ref 0.3–1)
MONOCYTES NFR BLD: 5.6 % (ref 4–15)
NEUTROPHILS # BLD AUTO: 8.4 K/UL (ref 1.8–7.7)
NEUTROPHILS NFR BLD: 74.9 % (ref 38–73)
NRBC BLD-RTO: 0 /100 WBC
PCO2 BLDA: 47.1 MMHG (ref 35–45)
PH SMN: 7.46 [PH] (ref 7.35–7.45)
PLATELET # BLD AUTO: 417 K/UL (ref 150–350)
PMV BLD AUTO: 10 FL (ref 9.2–12.9)
PO2 BLDA: 27 MMHG (ref 40–60)
POC BE: 10 MMOL/L
POC SATURATED O2: 54 % (ref 95–100)
POC TCO2: 35 MMOL/L (ref 24–29)
POCT GLUCOSE: 141 MG/DL (ref 70–110)
POCT GLUCOSE: 160 MG/DL (ref 70–110)
POCT GLUCOSE: 192 MG/DL (ref 70–110)
POCT GLUCOSE: 203 MG/DL (ref 70–110)
POTASSIUM SERPL-SCNC: 3.7 MMOL/L (ref 3.5–5.1)
POTASSIUM SERPL-SCNC: 3.7 MMOL/L (ref 3.5–5.1)
POTASSIUM SERPL-SCNC: 4 MMOL/L (ref 3.5–5.1)
PROT SERPL-MCNC: 7.9 G/DL (ref 6–8.4)
RBC # BLD AUTO: 3.97 M/UL (ref 4.6–6.2)
SAMPLE: ABNORMAL
SITE: ABNORMAL
SODIUM SERPL-SCNC: 128 MMOL/L (ref 136–145)
SODIUM SERPL-SCNC: 129 MMOL/L (ref 136–145)
SODIUM SERPL-SCNC: 129 MMOL/L (ref 136–145)
SODIUM SERPL-SCNC: 130 MMOL/L (ref 136–145)
SODIUM SERPL-SCNC: 130 MMOL/L (ref 136–145)
SODIUM SERPL-SCNC: 131 MMOL/L (ref 136–145)
VON WILLEBRAND EVAL PPP-IMP: NORMAL
VWF MULTIMERS PPP QL: NORMAL
WBC # BLD AUTO: 11.21 K/UL (ref 3.9–12.7)

## 2020-01-31 PROCEDURE — 25000003 PHARM REV CODE 250: Performed by: NURSE PRACTITIONER

## 2020-01-31 PROCEDURE — 83050 HGB METHEMOGLOBIN QUAN: CPT

## 2020-01-31 PROCEDURE — 86901 BLOOD TYPING SEROLOGIC RH(D): CPT

## 2020-01-31 PROCEDURE — 84132 ASSAY OF SERUM POTASSIUM: CPT

## 2020-01-31 PROCEDURE — 83735 ASSAY OF MAGNESIUM: CPT

## 2020-01-31 PROCEDURE — 25000003 PHARM REV CODE 250: Performed by: STUDENT IN AN ORGANIZED HEALTH CARE EDUCATION/TRAINING PROGRAM

## 2020-01-31 PROCEDURE — 63600175 PHARM REV CODE 636 W HCPCS: Performed by: NURSE PRACTITIONER

## 2020-01-31 PROCEDURE — 80162 ASSAY OF DIGOXIN TOTAL: CPT

## 2020-01-31 PROCEDURE — 27000221 HC OXYGEN, UP TO 24 HOURS

## 2020-01-31 PROCEDURE — 25000003 PHARM REV CODE 250: Performed by: PHYSICIAN ASSISTANT

## 2020-01-31 PROCEDURE — 63600175 PHARM REV CODE 636 W HCPCS: Performed by: STUDENT IN AN ORGANIZED HEALTH CARE EDUCATION/TRAINING PROGRAM

## 2020-01-31 PROCEDURE — 80053 COMPREHEN METABOLIC PANEL: CPT

## 2020-01-31 PROCEDURE — 99232 SBSQ HOSP IP/OBS MODERATE 35: CPT | Mod: ,,, | Performed by: NURSE PRACTITIONER

## 2020-01-31 PROCEDURE — 20000000 HC ICU ROOM

## 2020-01-31 PROCEDURE — 82803 BLOOD GASES ANY COMBINATION: CPT

## 2020-01-31 PROCEDURE — 84295 ASSAY OF SERUM SODIUM: CPT

## 2020-01-31 PROCEDURE — 99291 PR CRITICAL CARE, E/M 30-74 MINUTES: ICD-10-PCS | Mod: ,,, | Performed by: NURSE PRACTITIONER

## 2020-01-31 PROCEDURE — 84295 ASSAY OF SERUM SODIUM: CPT | Mod: 91

## 2020-01-31 PROCEDURE — 85730 THROMBOPLASTIN TIME PARTIAL: CPT

## 2020-01-31 PROCEDURE — 25000003 PHARM REV CODE 250: Performed by: INTERNAL MEDICINE

## 2020-01-31 PROCEDURE — 94761 N-INVAS EAR/PLS OXIMETRY MLT: CPT

## 2020-01-31 PROCEDURE — 85025 COMPLETE CBC W/AUTO DIFF WBC: CPT

## 2020-01-31 PROCEDURE — 99291 CRITICAL CARE FIRST HOUR: CPT | Mod: ,,, | Performed by: NURSE PRACTITIONER

## 2020-01-31 PROCEDURE — 25000003 PHARM REV CODE 250: Performed by: HOSPITALIST

## 2020-01-31 PROCEDURE — 63600175 PHARM REV CODE 636 W HCPCS: Performed by: HOSPITALIST

## 2020-01-31 PROCEDURE — 63600367 HC NITRIC OXIDE PER HOUR

## 2020-01-31 PROCEDURE — 99900035 HC TECH TIME PER 15 MIN (STAT)

## 2020-01-31 PROCEDURE — 99232 PR SUBSEQUENT HOSPITAL CARE,LEVL II: ICD-10-PCS | Mod: ,,, | Performed by: NURSE PRACTITIONER

## 2020-01-31 RX ORDER — FAMOTIDINE 40 MG/5ML
40 POWDER, FOR SUSPENSION ORAL DAILY
Status: DISCONTINUED | OUTPATIENT
Start: 2020-02-01 | End: 2020-02-06

## 2020-01-31 RX ORDER — TOLVAPTAN 15 MG/1
15 TABLET ORAL ONCE
Status: COMPLETED | OUTPATIENT
Start: 2020-01-31 | End: 2020-01-31

## 2020-01-31 RX ORDER — POTASSIUM CHLORIDE 750 MG/1
30 CAPSULE, EXTENDED RELEASE ORAL ONCE
Status: COMPLETED | OUTPATIENT
Start: 2020-01-31 | End: 2020-01-31

## 2020-01-31 RX ADMIN — CETIRIZINE HYDROCHLORIDE 10 MG: 10 TABLET, FILM COATED ORAL at 08:01

## 2020-01-31 RX ADMIN — INSULIN ASPART 2 UNITS: 100 INJECTION, SOLUTION INTRAVENOUS; SUBCUTANEOUS at 12:01

## 2020-01-31 RX ADMIN — STANDARDIZED SENNA CONCENTRATE AND DOCUSATE SODIUM 2 TABLET: 8.6; 5 TABLET ORAL at 08:01

## 2020-01-31 RX ADMIN — DOBUTAMINE IN DEXTROSE 5 MCG/KG/MIN: 200 INJECTION, SOLUTION INTRAVENOUS at 12:01

## 2020-01-31 RX ADMIN — EPINEPHRINE 0.04 MCG/KG/MIN: 1 INJECTION PARENTERAL at 07:01

## 2020-01-31 RX ADMIN — Medication 800 MG: at 08:01

## 2020-01-31 RX ADMIN — HYDRALAZINE HYDROCHLORIDE 50 MG: 50 TABLET ORAL at 09:01

## 2020-01-31 RX ADMIN — ACETAMINOPHEN 1000 MG: 500 TABLET ORAL at 02:01

## 2020-01-31 RX ADMIN — POTASSIUM CHLORIDE 30 MEQ: 750 CAPSULE, EXTENDED RELEASE ORAL at 06:01

## 2020-01-31 RX ADMIN — ACETAMINOPHEN 1000 MG: 500 TABLET ORAL at 09:01

## 2020-01-31 RX ADMIN — FUROSEMIDE 20 MG/HR: 10 INJECTION, SOLUTION INTRAMUSCULAR; INTRAVENOUS at 07:01

## 2020-01-31 RX ADMIN — Medication 6 MG: at 08:01

## 2020-01-31 RX ADMIN — ISOSORBIDE DINITRATE 10 MG: 10 TABLET ORAL at 06:01

## 2020-01-31 RX ADMIN — HYDRALAZINE HYDROCHLORIDE 50 MG: 50 TABLET ORAL at 06:01

## 2020-01-31 RX ADMIN — POLYETHYLENE GLYCOL 3350 17 G: 17 POWDER, FOR SOLUTION ORAL at 08:01

## 2020-01-31 RX ADMIN — ATORVASTATIN CALCIUM 40 MG: 20 TABLET, FILM COATED ORAL at 08:01

## 2020-01-31 RX ADMIN — ISOSORBIDE DINITRATE 10 MG: 10 TABLET ORAL at 02:01

## 2020-01-31 RX ADMIN — HYDRALAZINE HYDROCHLORIDE 50 MG: 50 TABLET ORAL at 02:01

## 2020-01-31 RX ADMIN — TRIAMCINOLONE ACETONIDE: 5 CREAM TOPICAL at 08:01

## 2020-01-31 RX ADMIN — FAMOTIDINE 20 MG: 40 POWDER, FOR SUSPENSION ORAL at 08:01

## 2020-01-31 RX ADMIN — ISOSORBIDE DINITRATE 10 MG: 10 TABLET ORAL at 09:01

## 2020-01-31 RX ADMIN — HYDROXYZINE HYDROCHLORIDE 25 MG: 25 TABLET, FILM COATED ORAL at 08:01

## 2020-01-31 RX ADMIN — FUROSEMIDE 20 MG/HR: 10 INJECTION, SOLUTION INTRAMUSCULAR; INTRAVENOUS at 06:01

## 2020-01-31 RX ADMIN — ACETAMINOPHEN 500 MG: 500 TABLET ORAL at 06:01

## 2020-01-31 RX ADMIN — TRIAMCINOLONE ACETONIDE: 5 CREAM TOPICAL at 02:01

## 2020-01-31 RX ADMIN — TOLVAPTAN 15 MG: 15 TABLET ORAL at 12:01

## 2020-01-31 NOTE — CARE UPDATE
Last doses of Hydralazine and Imdur to be given tomorrow per Dr. Joshi. From Sunday forward would not like patient to receive these medications in preparation for possible LVAD.

## 2020-01-31 NOTE — ASSESSMENT & PLAN NOTE
-Creatinine is down to 1.6 today. Was 3.0 on admit and got as high as 4.3 at OSH prior to transfer. Renal function was normal 8 months ago.  -Cont to monitor with diuresis

## 2020-01-31 NOTE — ASSESSMENT & PLAN NOTE
BG goal 140 - 180     Continue Low Dose SQ Insulin Correction Scale PRN BG excursions.   BG Monitoring AC/HS     Will monitor for prandial excursions and will consider starting scheduled Novolog     ** Please call Endocrine for any BG related issues **  ** Please notify Endocrine for any change and/or advance in diet**    Discharge Planning:    TBD. Please notify endocrinology prior to discharge.

## 2020-01-31 NOTE — NURSING
CMICU DAILY GOALS       A: Awake    RASS: Goal - RASS Goal: 0-->alert and calm  Actual - RASS (Mcmillan Agitation-Sedation Scale): 0-->alert and calm   Restraint necessity: Clinical Justification: Removing medical devices  B: Breath   SBT: Not intubated   C: Coordinate A & B, analgesics/sedatives   Pain: managed    SAT: Not intubated  D: Delirium   CAM-ICU: Overall CAM-ICU: Negative  E: Early Mobility   MOVE Screen: Pass   Activity: Activity Management: activity encouraged  FAS: Feeding/Nutrition   Diet order: Diet/Nutrition Received: regular,   Fluid restriction: Fluid Requirement: 1000FR  T: Thrombus   DVT prophylaxis: VTE Required Core Measure: Pharmacological prophylaxis initiated/maintained  H: HOB Elevation   Head of Bed (HOB): HOB elevated  U: Ulcer Prophylaxis   GI: yes  G: Glucose control   managed    S: Skin   Bundle compliance: yes   Bathing/Skin Care: bath, chlorhexidine, bath, complete, dressed/undressed, incontinence care, linen changed Date: 1/30    B: Bowel Function   no issues   I: Indwelling Catheters   Mc necessity:      Urethral Catheter 01/15/20 2030 Straight-tip 16 Fr.-Reason for Continuing Urinary Catheterization: Critically ill in ICU requiring intensive monitoring   CVC necessity: Yes   IPAD offered: Yes  D: De-escalation Antibx   n/a  Plan for the day   Monitor labs, increase activity and nutrition   Family/Goals of care/Code Status   Code Status: Full Code     No acute events throughout day, VS and assessment per flow sheet, patient progressing towards goals as tolerated, plan of care reviewed with Saba Lott and family, all concerns addressed, will continue to monitor.

## 2020-01-31 NOTE — PROGRESS NOTES
Pharmacist Renal Dose Adjustment Note    Saba Lott is a 54 y.o. male being treated with the medication famotidine    Patient Data:    Vital Signs (Most Recent):  Temp: 97.6 °F (36.4 °C) (01/31/20 0701)  Pulse: 109 (01/31/20 0900)  Resp: (!) 28 (01/31/20 0900)  BP: 105/75 (01/31/20 0900)  SpO2: 100 % (01/31/20 0900)   Vital Signs (72h Range):  Temp:  [97.5 °F (36.4 °C)-98.7 °F (37.1 °C)]   Pulse:  []   Resp:  [0-51]   BP: ()/(50-84)   SpO2:  [90 %-100 %]      Recent Labs   Lab 01/29/20  1414 01/30/20  0352 01/31/20  0409   CREATININE 2.1* 1.7* 1.6*     Serum creatinine: 1.6 mg/dL (H) 01/31/20 0409  Estimated creatinine clearance: 54.4 mL/min (A)    Medication:famotidine dose: 20mg frequency daily will be changed to medication:famotidine dose:40mg frequency:daily    Pharmacist's Name: Low Hollins  Pharmacist's Extension: [phone]

## 2020-01-31 NOTE — PLAN OF CARE
CMICU DAILY GOALS       A: Awake    RASS: Goal - RASS Goal: 0-->alert and calm  Actual - RASS (Mcmillan Agitation-Sedation Scale): 0-->alert and calm   Restraint necessity: Clinical Justification: Removing medical devices  B: Breath   SBT: Not intubated   C: Coordinate A & B, analgesics/sedatives   Pain: managed    SAT: Not intubated  D: Delirium   CAM-ICU: Overall CAM-ICU: Negative  E: Early Mobility   MOVE Screen: Pass   Activity: Activity Management: activity adjusted per tolerance  FAS: Feeding/Nutrition   Diet order: Diet/Nutrition Received: regular,   Fluid restriction: Fluid Requirement: 1000FR  T: Thrombus   DVT prophylaxis: VTE Required Core Measure: Pharmacological prophylaxis initiated/maintained  H: HOB Elevation   Head of Bed (HOB): HOB at 30-45 degrees  U: Ulcer Prophylaxis   GI: yes  G: Glucose control   managed    S: Skin   Bundle compliance: yes   Bathing/Skin Care: bath, chlorhexidine, bath, complete, dressed/undressed, incontinence care, linen changed Date: 01/30/2020 (AM shift)  B: Bowel Function   no issues   I: Indwelling Catheters   Mc necessity:      Urethral Catheter 01/15/20 2030 Straight-tip 16 Fr.-Reason for Continuing Urinary Catheterization: Critically ill in ICU requiring intensive monitoring   CVC necessity: Yes   IPAD offered: Yes  D: De-escalation Antibx   No  Plan for the day   Continue to monitor kidney function, increase mobility, optimize nutritional support  Family/Goals of care/Code Status   Code Status: Full Code     No acute events throughout day, VS and assessment per flow sheet, patient progressing towards goals as tolerated, plan of care reviewed with Saba Lott and family, all concerns addressed, will continue to monitor.

## 2020-01-31 NOTE — SUBJECTIVE & OBJECTIVE
**Interval History: Continues to feel generally well. No overnight events. Net -ve 3.2L past 24 hours, and creatinine is down to 1.6. Na+ 129 after 15 mg Tolvaptan yesterday - will repeat today. Eating well and walking in halls    Continuous Infusions:   DOBUTamine 5 mcg/kg/min (01/31/20 0800)    epinephrine infustion (NON-TITRATING) 0.04 mcg/kg/min (01/31/20 0800)    furosemide (LASIX) 10 mg/mL infusion (non-titrating) 20 mg/hr (01/31/20 0800)    heparin (porcine) in D5W 17 Units/kg/hr (01/31/20 0800)    nitric oxide gas       Scheduled Meds:   acetaminophen  1,000 mg Oral Q8H    atorvastatin  40 mg Oral Daily    cetirizine  10 mg Oral Daily    famotidine  20 mg Oral Daily    hydrALAZINE  50 mg Oral Q8H    isosorbide dinitrate  10 mg Oral Q8H    magnesium oxide  800 mg Oral BID    magnesium sulfate IVPB  1 g Intravenous Once    melatonin  6 mg Oral Nightly    polyethylene glycol  17 g Oral Daily    senna-docusate 8.6-50 mg  2 tablet Oral BID    tolvaptan  15 mg Oral Once    triamcinolone acetonide 0.5%   Topical (Top) TID     PRN Meds:albuterol, bisacodyl, Dextrose 10% Bolus, Dextrose 10% Bolus, glucagon (human recombinant), glucose, glucose, heparin (PORCINE), heparin (PORCINE), hepatitis B, hydrOXYzine HCl, insulin aspart U-100, pneumococcal vaccine, sodium chloride 0.9%, traMADol    Review of patient's allergies indicates:   Allergen Reactions    Iodine and iodide containing products      Objective:     Vital Signs (Most Recent):  Temp: 97.6 °F (36.4 °C) (01/31/20 0701)  Pulse: 106 (01/31/20 0800)  Resp: (!) 28 (01/31/20 0800)  BP: 97/67 (01/31/20 0800)  SpO2: 99 % (01/31/20 0800) Vital Signs (24h Range):  Temp:  [97.6 °F (36.4 °C)-98.3 °F (36.8 °C)] 97.6 °F (36.4 °C)  Pulse:  [101-116] 106  Resp:  [16-51] 28  SpO2:  [93 %-100 %] 99 %  BP: ()/(55-80) 97/67     Patient Vitals for the past 72 hrs (Last 3 readings):   Weight   01/31/20 0300 85 kg (187 lb 6.3 oz)   01/30/20 0300 83.7 kg (184  lb 10.2 oz)   01/29/20 0315 82.2 kg (181 lb 3.5 oz)     Body mass index is 29.35 kg/m².      Intake/Output Summary (Last 24 hours) at 1/31/2020 0831  Last data filed at 1/31/2020 0800  Gross per 24 hour   Intake 2163.76 ml   Output 5075 ml   Net -2911.24 ml       Hemodynamic Parameters:       Telemetry: ST with PVC's    Physical Exam   Constitutional: He is oriented to person, place, and time. He appears well-developed and well-nourished.   HENT:   Head: Normocephalic and atraumatic.   Eyes: Pupils are equal, round, and reactive to light. Conjunctivae and EOM are normal.   Neck: Normal range of motion. Neck supple. No JVD present. No thyromegaly present.   Cardiovascular: Regular rhythm.   Tachycardic, + S3. Grade II/VI systolic murmur LSB   Pulmonary/Chest: Effort normal and breath sounds normal.   Abdominal: Soft. Bowel sounds are normal.   Musculoskeletal: Normal range of motion. He exhibits no edema.   Neurological: He is alert and oriented to person, place, and time.   Skin: Skin is warm and dry. Capillary refill takes 2 to 3 seconds.   Psychiatric: He has a normal mood and affect. His behavior is normal. Judgment and thought content normal.       Significant Labs:  CBC:  Recent Labs   Lab 01/29/20  0414 01/30/20  0352 01/31/20  0409   WBC 11.35 10.58 11.21   RBC 4.14* 4.03* 3.97*   HGB 11.1* 10.7* 10.7*   HCT 34.5* 33.4* 33.7*   * 416* 417*   MCV 83 83 85   MCH 26.8* 26.6* 27.0   MCHC 32.2 32.0 31.8*     BNP:  Recent Labs   Lab 01/27/20  1420   BNP 3,095*     CMP:  Recent Labs   Lab 01/29/20  1414  01/30/20  0352  01/31/20  0100 01/31/20  0409 01/31/20  0802   *  --  244*  --   --  183*  --    CALCIUM 9.8  --  8.9  --   --  9.2  --    ALBUMIN 3.6  --  3.6  --   --  3.7  --    PROT 8.2  --  7.5  --   --  7.9  --    *   < > 128*  128*   < > 130* 129*  129* 131*   K 4.5  --  3.7  3.7  --   --  3.7  3.7  --    CO2 32*  --  29  --   --  28  --    CL 86*  --  87*  --   --  89*  --    BUN 50*   --  45*  --   --  42*  --    CREATININE 2.1*  --  1.7*  --   --  1.6*  --    ALKPHOS 90  --  94  --   --  98  --    ALT 24  --  24  --   --  23  --    AST 31  --  28  --   --  25  --    BILITOT 1.2*  --  1.1*  --   --  1.1*  --     < > = values in this interval not displayed.      Coagulation:   Recent Labs   Lab 01/30/20  1155 01/30/20  1720 01/31/20  0409   APTT 61.0* 46.5* 54.4*     LDH:  No results for input(s): LDH in the last 72 hours.  Microbiology:  Microbiology Results (last 7 days)     ** No results found for the last 168 hours. **          I have reviewed all pertinent labs within the past 24 hours.    Estimated Creatinine Clearance: 54.4 mL/min (A) (based on SCr of 1.6 mg/dL (H)).    Diagnostic Results:  I have reviewed all pertinent imaging results/findings within the past 24 hours.

## 2020-01-31 NOTE — SUBJECTIVE & OBJECTIVE
"Interval HPI:   Overnight events: NAEON. BG at or slightly elevated above goal ranges with SQ prn Novolog correction scale.   Eatin%  Nausea: No  Hypoglycemia and intervention: No  Fever: No  TPN and/or TF: No  If yes, type of TF/TPN and rate: none    BP (!) 87/54   Pulse 104   Temp 97.4 °F (36.3 °C)   Resp (!) 22   Ht 5' 7" (1.702 m)   Wt 85 kg (187 lb 6.3 oz)   SpO2 100%   BMI 29.35 kg/m²     Labs Reviewed and Include    Recent Labs   Lab 20  0409 20  0802 20  1135   *  --   --    CALCIUM 9.2  --   --    ALBUMIN 3.7  --   --    PROT 7.9  --   --    *  129* 131*  --    K 3.7  3.7  --  4.0   CO2 28  --   --    CL 89*  --   --    BUN 42*  --   --    CREATININE 1.6*  --   --    ALKPHOS 98  --   --    ALT 23  --   --    AST 25  --   --    BILITOT 1.1*  --   --      Lab Results   Component Value Date    WBC 11.21 2020    HGB 10.7 (L) 2020    HCT 33.7 (L) 2020    MCV 85 2020     (H) 2020     No results for input(s): TSH, FREET4 in the last 168 hours.  Lab Results   Component Value Date    HGBA1C 6.6 (H) 2020       Nutritional status:   Body mass index is 29.35 kg/m².  Lab Results   Component Value Date    ALBUMIN 3.7 2020    ALBUMIN 3.6 2020    ALBUMIN 3.6 2020     Lab Results   Component Value Date    PREALBUMIN 23 2020    PREALBUMIN 15 (L) 2020    PREALBUMIN 17 (L) 2012       Estimated Creatinine Clearance: 54.4 mL/min (A) (based on SCr of 1.6 mg/dL (H)).    Accu-Checks  Recent Labs     20  0800 20  1212 20  1653 20  2211 20  0749 20  1201 20  1728 20  2208 20  0811 20  1210   POCTGLUCOSE 133* 143* 140* 170* 232* 246* 239* 145* 192* 203*       Current Medications and/or Treatments Impacting Glycemic Control  Immunotherapy:    Immunosuppressants     None        Steroids:   Hormones (From admission, onward)    Start     Stop Route " Frequency Ordered    01/17/20 2100  melatonin tablet 6 mg      -- Oral Nightly 01/17/20 1002        Pressors:    Autonomic Drugs (From admission, onward)    Start     Stop Route Frequency Ordered    01/26/20 1745  EPINEPHrine (ADRENALIN) 5 mg in sodium chloride 0.9% 250 mL infusion      -- IV Continuous 01/26/20 1639        Hyperglycemia/Diabetes Medications:   Antihyperglycemics (From admission, onward)    Start     Stop Route Frequency Ordered    01/23/20 1019  insulin aspart U-100 pen 0-5 Units      -- SubQ Before meals & nightly PRN 01/23/20 0919

## 2020-01-31 NOTE — PROGRESS NOTES
"[facility]  Endocrinology  Progress Note    Admit Date: 1/15/2020     Reason for Consult: Management of  Hyperglycemia     Surgical Procedure and Date:    ICD implantation 02/15/2019      HPI:   Patient is a 54 y.o. 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 Chronic MARC - Class II-III and mild LE edema.  Admitted to [facility] on Thursday due to severe SOB for a week with associated orthopnea, MARC, and PND unable to lie flat and found to be in acute diastolic heart failure.He was started on CRRT for a few days and tolerated well. Renal u/s was negative for obstruction and liver u/s without findings of cirrhosis. All of this was progression of cardiorenal syndrome with liver congestion from severe volume overload. No dx of DM noted on file. Patient denies history of DM at time of consult. Endocrinology consulted to manage BG during admission to [facility].          Lab Results   Component Value Date    HGBA1C 6.6 (H) 2020       Interval HPI:   Overnight events: NAEON. BG at or slightly elevated above goal ranges with SQ prn Novolog correction scale.   Eatin%  Nausea: No  Hypoglycemia and intervention: No  Fever: No  TPN and/or TF: No  If yes, type of TF/TPN and rate: none    BP (!) 87/54   Pulse 104   Temp 97.4 °F (36.3 °C)   Resp (!) 22   Ht 5' 7" (1.702 m)   Wt 85 kg (187 lb 6.3 oz)   SpO2 100%   BMI 29.35 kg/m²      Labs Reviewed and Include    Recent Labs   Lab 20  0409 20  0802 20  1135   *  --   --    CALCIUM 9.2  --   --    ALBUMIN 3.7  --   --    PROT 7.9  --   --    *  129* 131*  --    K 3.7  3.7  --  4.0   CO2 28  --   --    CL 89*  --   --    BUN 42*  --   --    CREATININE 1.6*  --   --    ALKPHOS 98  --   --    ALT 23  --   --    AST 25  --   --    BILITOT 1.1*  -- "   --      Lab Results   Component Value Date    WBC 11.21 01/31/2020    HGB 10.7 (L) 01/31/2020    HCT 33.7 (L) 01/31/2020    MCV 85 01/31/2020     (H) 01/31/2020     No results for input(s): TSH, FREET4 in the last 168 hours.  Lab Results   Component Value Date    HGBA1C 6.6 (H) 01/16/2020       Nutritional status:   Body mass index is 29.35 kg/m².  Lab Results   Component Value Date    ALBUMIN 3.7 01/31/2020    ALBUMIN 3.6 01/30/2020    ALBUMIN 3.6 01/29/2020     Lab Results   Component Value Date    PREALBUMIN 23 01/28/2020    PREALBUMIN 15 (L) 01/22/2020    PREALBUMIN 17 (L) 07/23/2012       Estimated Creatinine Clearance: 54.4 mL/min (A) (based on SCr of 1.6 mg/dL (H)).    Accu-Checks  Recent Labs     01/29/20  0800 01/29/20  1212 01/29/20  1653 01/29/20  2211 01/30/20  0749 01/30/20  1201 01/30/20  1728 01/30/20  2208 01/31/20  0811 01/31/20  1210   POCTGLUCOSE 133* 143* 140* 170* 232* 246* 239* 145* 192* 203*       Current Medications and/or Treatments Impacting Glycemic Control  Immunotherapy:    Immunosuppressants     None        Steroids:   Hormones (From admission, onward)    Start     Stop Route Frequency Ordered    01/17/20 2100  melatonin tablet 6 mg      -- Oral Nightly 01/17/20 1002        Pressors:    Autonomic Drugs (From admission, onward)    Start     Stop Route Frequency Ordered    01/26/20 1745  EPINEPHrine (ADRENALIN) 5 mg in sodium chloride 0.9% 250 mL infusion      -- IV Continuous 01/26/20 1639        Hyperglycemia/Diabetes Medications:   Antihyperglycemics (From admission, onward)    Start     Stop Route Frequency Ordered    01/23/20 1019  insulin aspart U-100 pen 0-5 Units      -- SubQ Before meals & nightly PRN 01/23/20 0919          ASSESSMENT and PLAN    * Cardiogenic shock  Managed per primary team  Avoid hypoglycemia        Acute hyperglycemia  BG goal 140 - 180     Continue Low Dose SQ Insulin Correction Scale PRN BG excursions.   BG Monitoring AC/HS     Will monitor for  prandial excursions and will consider starting scheduled Novolog     ** Please call Endocrine for any BG related issues **  ** Please notify Endocrine for any change and/or advance in diet**    Discharge Planning:    TBD. Please notify endocrinology prior to discharge.         Essential hypertension  Managed per primary team  Condition may cause insulin resistance         ANJELICA (acute kidney injury)  Titrate insulin slowly to avoid hypoglycemia as the risk of hypoglycemia increases with decreased creatinine clearance.          Morbid obesity  Body mass index is 29.35 kg/m².  may contribute to insulin resistance            Massiel Paz NP  Endocrinology  [facility]

## 2020-01-31 NOTE — PROGRESS NOTES
[facility]  Heart Transplant  Progress Note    Patient Name: Saba Lott  MRN: [id number]  Admission Date: 1/15/2020  Hospital Length of Stay: 16 days  Attending Physician: Lian Daniel MD  Primary Care Provider: Primary Doctor No  Principal Problem:Cardiogenic shock    Subjective:     **Interval History: Continues to feel generally well. No overnight events. Net -ve 3.2L past 24 hours, and creatinine is down to 1.6. Na+ 129 after 15 mg Tolvaptan yesterday - will repeat today. Eating well and walking in halls    Continuous Infusions:   DOBUTamine 5 mcg/kg/min (01/31/20 0800)    epinephrine infustion (NON-TITRATING) 0.04 mcg/kg/min (01/31/20 0800)    furosemide (LASIX) 10 mg/mL infusion (non-titrating) 20 mg/hr (01/31/20 0800)    heparin (porcine) in D5W 17 Units/kg/hr (01/31/20 0800)    nitric oxide gas       Scheduled Meds:   acetaminophen  1,000 mg Oral Q8H    atorvastatin  40 mg Oral Daily    cetirizine  10 mg Oral Daily    famotidine  20 mg Oral Daily    hydrALAZINE  50 mg Oral Q8H    isosorbide dinitrate  10 mg Oral Q8H    magnesium oxide  800 mg Oral BID    magnesium sulfate IVPB  1 g Intravenous Once    melatonin  6 mg Oral Nightly    polyethylene glycol  17 g Oral Daily    senna-docusate 8.6-50 mg  2 tablet Oral BID    tolvaptan  15 mg Oral Once    triamcinolone acetonide 0.5%   Topical (Top) TID     PRN Meds:albuterol, bisacodyl, Dextrose 10% Bolus, Dextrose 10% Bolus, glucagon (human recombinant), glucose, glucose, heparin (PORCINE), heparin (PORCINE), hepatitis B, hydrOXYzine HCl, insulin aspart U-100, pneumococcal vaccine, sodium chloride 0.9%, traMADol    Review of patient's allergies indicates:   Allergen Reactions    Iodine and iodide containing products      Objective:     Vital Signs (Most Recent):  Temp: 97.6 °F (36.4 °C) (01/31/20 0701)  Pulse: 106 (01/31/20 0800)  Resp: (!) 28 (01/31/20 0800)  BP: 97/67 (01/31/20 0800)  SpO2: 99 % (01/31/20 0800) Vital  Signs (24h Range):  Temp:  [97.6 °F (36.4 °C)-98.3 °F (36.8 °C)] 97.6 °F (36.4 °C)  Pulse:  [101-116] 106  Resp:  [16-51] 28  SpO2:  [93 %-100 %] 99 %  BP: ()/(55-80) 97/67     Patient Vitals for the past 72 hrs (Last 3 readings):   Weight   01/31/20 0300 85 kg (187 lb 6.3 oz)   01/30/20 0300 83.7 kg (184 lb 10.2 oz)   01/29/20 0315 82.2 kg (181 lb 3.5 oz)     Body mass index is 29.35 kg/m².      Intake/Output Summary (Last 24 hours) at 1/31/2020 0831  Last data filed at 1/31/2020 0800  Gross per 24 hour   Intake 2163.76 ml   Output 5075 ml   Net -2911.24 ml       Hemodynamic Parameters:       Telemetry: ST with PVC's    Physical Exam   Constitutional: He is oriented to person, place, and time. He appears well-developed and well-nourished.   HENT:   Head: Normocephalic and atraumatic.   Eyes: Pupils are equal, round, and reactive to light. Conjunctivae and EOM are normal.   Neck: Normal range of motion. Neck supple. No JVD present. No thyromegaly present.   Cardiovascular: Regular rhythm.   Tachycardic, + S3. Grade II/VI systolic murmur LSB   Pulmonary/Chest: Effort normal and breath sounds normal.   Abdominal: Soft. Bowel sounds are normal.   Musculoskeletal: Normal range of motion. He exhibits no edema.   Neurological: He is alert and oriented to person, place, and time.   Skin: Skin is warm and dry. Capillary refill takes 2 to 3 seconds.   Psychiatric: He has a normal mood and affect. His behavior is normal. Judgment and thought content normal.       Significant Labs:  CBC:  Recent Labs   Lab 01/29/20  0414 01/30/20  0352 01/31/20  0409   WBC 11.35 10.58 11.21   RBC 4.14* 4.03* 3.97*   HGB 11.1* 10.7* 10.7*   HCT 34.5* 33.4* 33.7*   * 416* 417*   MCV 83 83 85   MCH 26.8* 26.6* 27.0   MCHC 32.2 32.0 31.8*     BNP:  Recent Labs   Lab 01/27/20  1420   BNP 3,095*     CMP:  Recent Labs   Lab 01/29/20  1414  01/30/20  0352  01/31/20  0100 01/31/20  0409 01/31/20  0802   *  --  244*  --   --  183*   --    CALCIUM 9.8  --  8.9  --   --  9.2  --    ALBUMIN 3.6  --  3.6  --   --  3.7  --    PROT 8.2  --  7.5  --   --  7.9  --    *   < > 128*  128*   < > 130* 129*  129* 131*   K 4.5  --  3.7  3.7  --   --  3.7  3.7  --    CO2 32*  --  29  --   --  28  --    CL 86*  --  87*  --   --  89*  --    BUN 50*  --  45*  --   --  42*  --    CREATININE 2.1*  --  1.7*  --   --  1.6*  --    ALKPHOS 90  --  94  --   --  98  --    ALT 24  --  24  --   --  23  --    AST 31  --  28  --   --  25  --    BILITOT 1.2*  --  1.1*  --   --  1.1*  --     < > = values in this interval not displayed.      Coagulation:   Recent Labs   Lab 01/30/20  1155 01/30/20  1720 01/31/20  0409   APTT 61.0* 46.5* 54.4*     LDH:  No results for input(s): LDH in the last 72 hours.  Microbiology:  Microbiology Results (last 7 days)     ** No results found for the last 168 hours. **          I have reviewed all pertinent labs within the past 24 hours.    Estimated Creatinine Clearance: 54.4 mL/min (A) (based on SCr of 1.6 mg/dL (H)).    Diagnostic Results:  I have reviewed all pertinent imaging results/findings within the past 24 hours.    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 Chronic MARC - Class II-III and mild LE edema.      Hospital Course (synopsis of major diagnoses, care, treatment, and services provided during the course of the hospital stay):  -2/12 admit to CDU for diuresis with IV lasix  -IV abx   -CXR with suspected small left pleural effusion with associated left basilar atelectasis versus evolving airspace disease  -2/13 single chamber ICD implant; IV lasix decreased to BID  -2/16 add dobutamine, hold BB due to hypotension, no ACE-I or ARB due to recent HPI hypotension  -2/17 Lasix changed to PO  -2/18 dobutamine discontinued    Admitted  to [facility] on Thursday due to severe SOB for a week with associated orthopnea, MARC, and PND unable to lie flat and found to be in acute diastolic heart failure. States he normally weighs around 219 but up to 254lb on admission tonight. Says he hasn't been the most compliant in terms of fluid restriction and daily weights but has avoided salt. BNP on admission was 2375. BUN/CRT 32/1.4 He was started on IV diuretics but continued to deteriorate. Creatinine continued to increase and reached 4.3 with oliguria. He was started on CRRT for a few days and tolerated well. Renal u/s was negative for obstruction and liver u/s without findings of cirrhosis. All of this was progression of cardiorenal syndrome with liver congestion from severe volume overload. On arrival vitals stable and in no acute distress. He has obvious anasarca up to the chest. He did have uop of 500 at time of arrival also.    TTE 5/28/19  · Moderate left ventricular enlargement.  · Severely decreased left ventricular systolic function. The estimated ejection fraction is 20%  · Global hypokinetic wall motion.  · Grade III (severe) left ventricular diastolic dysfunction consistent with restrictive physiology.  · Severe left atrial enlargement.  · Moderate right ventricular enlargement.  · Low normal right ventricular systolic function.  · Mild right atrial enlargement.  · Moderate mitral regurgitation.  Mild to moderate tricuspid regurgitation    * Cardiogenic shock  -NICM; Likely Etoh induced  -Transferred from [facility] with IABP at 1:1 for further level of care. IABP removed 1/25  -Last 2D Echo 1/23/20: LVEF 20%, LVEDD 6.46 cm, RV mod dilated and mild to mod depressed, IVC 15  -RHC 1/27 on  5, Epi 0.04, Radha 20 ppm and Lasix 40 mg/hr: RA 12 (not correlating with CVP of 20 by PICC same day), PAP 38/22 (30), W 20 and CO/CI 4.11/2.06.   -sVO2 54 with calculated CO 4.57 on  5, Lasix 20, Radha 20 and Epi 0.04  -Net -ve 3.2L past 24  hours on Lasix 20 mg/hr plus 15 mg Tolvaptan yesterday. Creat is down to 1.6.  Dosing Tolvaptan today as Na+ remains 129.   -GDMT: Continue Hyd/Isordil  - He has completed the pathway and was presented at selection 1/29/20: It was the committee decision to decline the pt for transplant listing due to debility, poor renal function and lack of backup caregiver. Pt is deferred for LVAD at this time pending improvement in renal function and increase in physical activity.       Acute hyperglycemia  -HgA1C 6.6  -Endocrine following    Moderate malnutrition  - Boost glucose control added 1/27   - Prealbumin is 23    Morbid obesity  -Weight down 79# since admit with diuresis, making BMI now 29.7    ANJELICA (acute kidney injury)  -Creatinine is down to 1.6 today. Was 3.0 on admit and got as high as 4.3 at OSH prior to transfer. Renal function was normal 8 months ago.  -Cont to monitor with diuresis     Essential hypertension  -Currently essentially normotensive   -Continue Hyd/Isordil    Deep vein thrombosis (DVT) of right lower extremity  -Unsure of timing but was on eliquis PTA.   -Continue on heparin gtt for now      AICD (automatic cardioverter/defibrillator) present  -Medtronic single chamber ICD placed 2019 for primary prevention      Uninterrupted Critical Care/Counseling Time (not including procedures): 30 minutes      Mallika Lugo NP [id number]  Heart Transplant  [facility]

## 2020-01-31 NOTE — ASSESSMENT & PLAN NOTE
-NICM; Likely Etoh induced  -Transferred from [facility] with IABP at 1:1 for further level of care. IABP removed 1/25  -Last 2D Echo 1/23/20: LVEF 20%, LVEDD 6.46 cm, RV mod dilated and mild to mod depressed, IVC 15  -RHC 1/27 on  5, Epi 0.04, Radha 20 ppm and Lasix 40 mg/hr: RA 12 (not correlating with CVP of 20 by PICC same day), PAP 38/22 (30), W 20 and CO/CI 4.11/2.06.   -sVO2 54 with calculated CO 4.57 on  5, Lasix 20, Radha 20 and Epi 0.04  -Net -ve 3.2L past 24 hours on Lasix 20 mg/hr plus 15 mg Tolvaptan yesterday. Creat is down to 1.6.  Dosing Tolvaptan today as Na+ remains 129.   -GDMT: Continue Hyd/Isordil  - He has completed the pathway and was presented at selection 1/29/20: It was the committee decision to decline the pt for transplant listing due to debility, poor renal function and lack of backup caregiver. Pt is deferred for LVAD at this time pending improvement in renal function and increase in physical activity.

## 2020-02-01 LAB
ALBUMIN SERPL BCP-MCNC: 3.3 G/DL (ref 3.5–5.2)
ALLENS TEST: ABNORMAL
ALP SERPL-CCNC: 90 U/L (ref 55–135)
ALT SERPL W/O P-5'-P-CCNC: 19 U/L (ref 10–44)
ANION GAP SERPL CALC-SCNC: 12 MMOL/L (ref 8–16)
APTT BLDCRRT: 115.9 SEC (ref 21–32)
APTT BLDCRRT: 39.8 SEC (ref 21–32)
APTT BLDCRRT: 68.9 SEC (ref 21–32)
APTT BLDCRRT: 68.9 SEC (ref 21–32)
AST SERPL-CCNC: 21 U/L (ref 10–40)
BASOPHILS # BLD AUTO: 0.06 K/UL (ref 0–0.2)
BASOPHILS NFR BLD: 0.5 % (ref 0–1.9)
BILIRUB SERPL-MCNC: 0.9 MG/DL (ref 0.1–1)
BUN SERPL-MCNC: 39 MG/DL (ref 6–20)
CALCIUM SERPL-MCNC: 8.7 MG/DL (ref 8.7–10.5)
CHLORIDE SERPL-SCNC: 92 MMOL/L (ref 95–110)
CO2 SERPL-SCNC: 24 MMOL/L (ref 23–29)
CREAT SERPL-MCNC: 1.6 MG/DL (ref 0.5–1.4)
DIFFERENTIAL METHOD: ABNORMAL
DIGOXIN SERPL-MCNC: <0.1 NG/ML (ref 0.8–2)
EOSINOPHIL # BLD AUTO: 0.2 K/UL (ref 0–0.5)
EOSINOPHIL NFR BLD: 1.5 % (ref 0–8)
ERYTHROCYTE [DISTWIDTH] IN BLOOD BY AUTOMATED COUNT: 18.2 % (ref 11.5–14.5)
EST. GFR  (AFRICAN AMERICAN): 55.2 ML/MIN/1.73 M^2
EST. GFR  (NON AFRICAN AMERICAN): 47.8 ML/MIN/1.73 M^2
GLUCOSE SERPL-MCNC: 336 MG/DL (ref 70–110)
HCO3 UR-SCNC: 27.9 MMOL/L (ref 24–28)
HCT VFR BLD AUTO: 30.5 % (ref 40–54)
HGB BLD-MCNC: 9.7 G/DL (ref 14–18)
IMM GRANULOCYTES # BLD AUTO: 0.11 K/UL (ref 0–0.04)
IMM GRANULOCYTES NFR BLD AUTO: 1 % (ref 0–0.5)
LYMPHOCYTES # BLD AUTO: 1.7 K/UL (ref 1–4.8)
LYMPHOCYTES NFR BLD: 14.9 % (ref 18–48)
MAGNESIUM SERPL-MCNC: 2.2 MG/DL (ref 1.6–2.6)
MCH RBC QN AUTO: 27.1 PG (ref 27–31)
MCHC RBC AUTO-ENTMCNC: 31.8 G/DL (ref 32–36)
MCV RBC AUTO: 85 FL (ref 82–98)
METHEMOGLOBIN: 0.3 % (ref 0–3)
MONOCYTES # BLD AUTO: 0.7 K/UL (ref 0.3–1)
MONOCYTES NFR BLD: 6.2 % (ref 4–15)
NEUTROPHILS # BLD AUTO: 8.5 K/UL (ref 1.8–7.7)
NEUTROPHILS NFR BLD: 75.9 % (ref 38–73)
NRBC BLD-RTO: 0 /100 WBC
PCO2 BLDA: 38 MMHG (ref 35–45)
PH SMN: 7.47 [PH] (ref 7.35–7.45)
PLATELET # BLD AUTO: 391 K/UL (ref 150–350)
PMV BLD AUTO: 9.9 FL (ref 9.2–12.9)
PO2 BLDA: 24 MMHG (ref 40–60)
POC BE: 4 MMOL/L
POC SATURATED O2: 47 % (ref 95–100)
POC TCO2: 29 MMOL/L (ref 24–29)
POCT GLUCOSE: 108 MG/DL (ref 70–110)
POCT GLUCOSE: 148 MG/DL (ref 70–110)
POCT GLUCOSE: 150 MG/DL (ref 70–110)
POTASSIUM SERPL-SCNC: 3.8 MMOL/L (ref 3.5–5.1)
POTASSIUM SERPL-SCNC: 3.8 MMOL/L (ref 3.5–5.1)
PROT SERPL-MCNC: 7 G/DL (ref 6–8.4)
RBC # BLD AUTO: 3.58 M/UL (ref 4.6–6.2)
SAMPLE: ABNORMAL
SITE: ABNORMAL
SODIUM SERPL-SCNC: 126 MMOL/L (ref 136–145)
SODIUM SERPL-SCNC: 128 MMOL/L (ref 136–145)
SODIUM SERPL-SCNC: 129 MMOL/L (ref 136–145)
WBC # BLD AUTO: 11.2 K/UL (ref 3.9–12.7)

## 2020-02-01 PROCEDURE — 20000000 HC ICU ROOM

## 2020-02-01 PROCEDURE — 99233 SBSQ HOSP IP/OBS HIGH 50: CPT | Mod: ,,, | Performed by: INTERNAL MEDICINE

## 2020-02-01 PROCEDURE — 25000003 PHARM REV CODE 250: Performed by: PHYSICIAN ASSISTANT

## 2020-02-01 PROCEDURE — 99233 PR SUBSEQUENT HOSPITAL CARE,LEVL III: ICD-10-PCS | Mod: ,,, | Performed by: INTERNAL MEDICINE

## 2020-02-01 PROCEDURE — 25000003 PHARM REV CODE 250: Performed by: NURSE PRACTITIONER

## 2020-02-01 PROCEDURE — 84295 ASSAY OF SERUM SODIUM: CPT | Mod: 91

## 2020-02-01 PROCEDURE — 25000003 PHARM REV CODE 250: Performed by: INTERNAL MEDICINE

## 2020-02-01 PROCEDURE — 25000003 PHARM REV CODE 250: Performed by: STUDENT IN AN ORGANIZED HEALTH CARE EDUCATION/TRAINING PROGRAM

## 2020-02-01 PROCEDURE — 80053 COMPREHEN METABOLIC PANEL: CPT

## 2020-02-01 PROCEDURE — 27000221 HC OXYGEN, UP TO 24 HOURS

## 2020-02-01 PROCEDURE — 83735 ASSAY OF MAGNESIUM: CPT

## 2020-02-01 PROCEDURE — 85730 THROMBOPLASTIN TIME PARTIAL: CPT | Mod: 91

## 2020-02-01 PROCEDURE — 99900035 HC TECH TIME PER 15 MIN (STAT)

## 2020-02-01 PROCEDURE — 63600175 PHARM REV CODE 636 W HCPCS: Performed by: NURSE PRACTITIONER

## 2020-02-01 PROCEDURE — 80162 ASSAY OF DIGOXIN TOTAL: CPT

## 2020-02-01 PROCEDURE — 63600175 PHARM REV CODE 636 W HCPCS: Performed by: STUDENT IN AN ORGANIZED HEALTH CARE EDUCATION/TRAINING PROGRAM

## 2020-02-01 PROCEDURE — 94761 N-INVAS EAR/PLS OXIMETRY MLT: CPT

## 2020-02-01 PROCEDURE — 63600367 HC NITRIC OXIDE PER HOUR

## 2020-02-01 PROCEDURE — 82803 BLOOD GASES ANY COMBINATION: CPT

## 2020-02-01 PROCEDURE — 63600175 PHARM REV CODE 636 W HCPCS: Performed by: HOSPITALIST

## 2020-02-01 PROCEDURE — 85730 THROMBOPLASTIN TIME PARTIAL: CPT

## 2020-02-01 PROCEDURE — 85025 COMPLETE CBC W/AUTO DIFF WBC: CPT

## 2020-02-01 RX ORDER — TOLVAPTAN 15 MG/1
15 TABLET ORAL ONCE
Status: COMPLETED | OUTPATIENT
Start: 2020-02-01 | End: 2020-02-01

## 2020-02-01 RX ORDER — POTASSIUM CHLORIDE 20 MEQ/1
20 TABLET, EXTENDED RELEASE ORAL ONCE
Status: COMPLETED | OUTPATIENT
Start: 2020-02-01 | End: 2020-02-01

## 2020-02-01 RX ADMIN — ISOSORBIDE DINITRATE 10 MG: 10 TABLET ORAL at 10:02

## 2020-02-01 RX ADMIN — ACETAMINOPHEN 1000 MG: 500 TABLET ORAL at 10:02

## 2020-02-01 RX ADMIN — HYDRALAZINE HYDROCHLORIDE 50 MG: 50 TABLET ORAL at 10:02

## 2020-02-01 RX ADMIN — DOBUTAMINE IN DEXTROSE 5 MCG/KG/MIN: 200 INJECTION, SOLUTION INTRAVENOUS at 05:02

## 2020-02-01 RX ADMIN — CETIRIZINE HYDROCHLORIDE 10 MG: 10 TABLET, FILM COATED ORAL at 08:02

## 2020-02-01 RX ADMIN — TRIAMCINOLONE ACETONIDE: 5 CREAM TOPICAL at 02:02

## 2020-02-01 RX ADMIN — TOLVAPTAN 15 MG: 15 TABLET ORAL at 01:02

## 2020-02-01 RX ADMIN — ATORVASTATIN CALCIUM 40 MG: 20 TABLET, FILM COATED ORAL at 08:02

## 2020-02-01 RX ADMIN — HEPARIN SODIUM AND DEXTROSE 17 UNITS/KG/HR: 10000; 5 INJECTION INTRAVENOUS at 05:02

## 2020-02-01 RX ADMIN — DOBUTAMINE IN DEXTROSE 5 MCG/KG/MIN: 200 INJECTION, SOLUTION INTRAVENOUS at 10:02

## 2020-02-01 RX ADMIN — EPINEPHRINE 0.04 MCG/KG/MIN: 1 INJECTION PARENTERAL at 05:02

## 2020-02-01 RX ADMIN — TRIAMCINOLONE ACETONIDE: 5 CREAM TOPICAL at 08:02

## 2020-02-01 RX ADMIN — FAMOTIDINE 40 MG: 40 POWDER, FOR SUSPENSION ORAL at 08:02

## 2020-02-01 RX ADMIN — Medication 6 MG: at 10:02

## 2020-02-01 RX ADMIN — Medication 800 MG: at 08:02

## 2020-02-01 RX ADMIN — TRIAMCINOLONE ACETONIDE: 5 CREAM TOPICAL at 10:02

## 2020-02-01 RX ADMIN — ISOSORBIDE DINITRATE 10 MG: 10 TABLET ORAL at 06:02

## 2020-02-01 RX ADMIN — FUROSEMIDE 20 MG/HR: 10 INJECTION, SOLUTION INTRAMUSCULAR; INTRAVENOUS at 02:02

## 2020-02-01 RX ADMIN — EPINEPHRINE 0.04 MCG/KG/MIN: 1 INJECTION PARENTERAL at 10:02

## 2020-02-01 RX ADMIN — HYDRALAZINE HYDROCHLORIDE 50 MG: 50 TABLET ORAL at 06:02

## 2020-02-01 RX ADMIN — ISOSORBIDE DINITRATE 10 MG: 10 TABLET ORAL at 02:02

## 2020-02-01 RX ADMIN — STANDARDIZED SENNA CONCENTRATE AND DOCUSATE SODIUM 2 TABLET: 8.6; 5 TABLET ORAL at 08:02

## 2020-02-01 RX ADMIN — HEPARIN SODIUM AND DEXTROSE 17 UNITS/KG/HR: 10000; 5 INJECTION INTRAVENOUS at 10:02

## 2020-02-01 RX ADMIN — ACETAMINOPHEN 1000 MG: 500 TABLET ORAL at 02:02

## 2020-02-01 RX ADMIN — HYDRALAZINE HYDROCHLORIDE 50 MG: 50 TABLET ORAL at 02:02

## 2020-02-01 RX ADMIN — Medication 800 MG: at 10:02

## 2020-02-01 RX ADMIN — ACETAMINOPHEN 1000 MG: 500 TABLET ORAL at 06:02

## 2020-02-01 RX ADMIN — POLYETHYLENE GLYCOL 3350 17 G: 17 POWDER, FOR SOLUTION ORAL at 08:02

## 2020-02-01 RX ADMIN — STANDARDIZED SENNA CONCENTRATE AND DOCUSATE SODIUM 2 TABLET: 8.6; 5 TABLET ORAL at 10:02

## 2020-02-01 RX ADMIN — POTASSIUM CHLORIDE 20 MEQ: 1500 TABLET, EXTENDED RELEASE ORAL at 06:02

## 2020-02-01 NOTE — PLAN OF CARE
CMICU DAILY GOALS       A: Awake    RASS: Goal - RASS Goal: 0-->alert and calm  Actual - RASS (Mcmillan Agitation-Sedation Scale): 0-->alert and calm   Restraint necessity: not needed  B: Breath   SBT: Not intubated   C: Coordinate A & B, analgesics/sedatives   Pain: managed    SAT: Not intubated  D: Delirium   CAM-ICU: Overall CAM-ICU: Negative  E: Early Mobility   MOVE Screen: Pass   Activity: Activity Management: activity adjusted per tolerance  FAS: Feeding/Nutrition   Diet order: Diet/Nutrition Received: regular(double portions),   Fluid restriction: Fluid Requirement: 1000FR  T: Thrombus   DVT prophylaxis: VTE Required Core Measure: Pharmacological prophylaxis initiated/maintained  H: HOB Elevation   Head of Bed (HOB): HOB at 30-45 degrees  U: Ulcer Prophylaxis   GI: yes  G: Glucose control   managed    S: Skin   Bundle compliance: yes   Bathing/Skin Care: back care, bath, chlorhexidine, bath, complete, incontinence care, linen changed, dressed/undressed Date: 1/31/20 AM shift  B: Bowel Function   no issues   I: Indwelling Catheters   Mc necessity: [REMOVED]      Urethral Catheter 01/15/20 2030 Straight-tip 16 Fr.-Reason for Continuing Urinary Catheterization: Critically ill in ICU requiring intensive monitoring   CVC necessity: Yes   IPAD offered: Yes  D: De-escalation Antibx   Yes  Plan for the day   Monitor labs and vitals, continue to work up for possible LVAD  Family/Goals of care/Code Status   Code Status: Full Code     No acute events throughout day, VS and assessment per flow sheet, patient progressing towards goals as tolerated, plan of care reviewed with Saba Lott and family, all concerns addressed, will continue to monitor.

## 2020-02-01 NOTE — CARE UPDATE
BG goal 140 - 180      Continue Low Dose SQ Insulin Correction Scale PRN BG excursions.   BG Monitoring AC/HS     ** Please call Endocrine for any BG related issues **  ** Please notify Endocrine for any change and/or advance in diet**     Discharge Planning:    TBD. Please notify endocrinology prior to discharge.

## 2020-02-01 NOTE — PLAN OF CARE
CMICU DAILY GOALS       A: Awake    RASS: Goal - RASS Goal: 0-->alert and calm  Actual - RASS (Mcmillan Agitation-Sedation Scale): 0-->alert and calm   Restraint necessity: Clinical Justification: Removing medical devices  B: Breath   SBT: Not intubated   C: Coordinate A & B, analgesics/sedatives   Pain: managed    SAT: Not intubated  D: Delirium   CAM-ICU: Overall CAM-ICU: Negative  E: Early Mobility   MOVE Screen: Pass   Activity: Activity Management: activity adjusted per tolerance  FAS: Feeding/Nutrition   Diet order: Diet/Nutrition Received: regular(double portions),   Fluid restriction: Fluid Requirement: 1000FR  T: Thrombus   DVT prophylaxis: VTE Required Core Measure: Pharmacological prophylaxis initiated/maintained  H: HOB Elevation   Head of Bed (HOB): HOB at 20-30 degrees  U: Ulcer Prophylaxis   GI: yes  G: Glucose control   managed    S: Skin   Bundle compliance: yes   Bathing/Skin Care: back care, bath, chlorhexidine, bath, complete, incontinence care, linen changed, dressed/undressed Date: 1/31 AM shift  B: Bowel Function   no issues   I: Indwelling Catheters   Mc necessity: [REMOVED]      Urethral Catheter 01/15/20 2030 Straight-tip 16 Fr.-Reason for Continuing Urinary Catheterization: Critically ill in ICU requiring intensive monitoring   CVC necessity: Yes   IPAD offered: Yes  D: De-escalation Antibx   Yes  Plan for the day   Plan to monitor Na and continue LVAD workup.    Family/Goals of care/Code Status   Code Status: Full Code     No acute events throughout day, VS and assessment per flow sheet, patient progressing towards goals as tolerated, plan of care reviewed with Saba Lott and family, all concerns addressed, will continue to monitor.

## 2020-02-01 NOTE — PROGRESS NOTES
[facility]  Heart Transplant  Progress Note    Patient Name: Saba Lott  MRN: [id number]  Admission Date: 1/15/2020  Hospital Length of Stay: 17 days  Attending Physician: Lian Daniel MD  Primary Care Provider: Primary Doctor No  Principal Problem:Cardiogenic shock    Subjective:     Interval History: No acute events overnight. Denies chest pain, palpitations or shortness of breath.       Continuous Infusions:   DOBUTamine 5 mcg/kg/min (02/01/20 1000)    epinephrine infustion (NON-TITRATING) 0.04 mcg/kg/min (02/01/20 1000)    furosemide (LASIX) 10 mg/mL infusion (non-titrating) 20 mg/hr (02/01/20 1000)    heparin (porcine) in D5W 17 Units/kg/hr (02/01/20 1000)    nitric oxide gas       Scheduled Meds:   acetaminophen  1,000 mg Oral Q8H    atorvastatin  40 mg Oral Daily    cetirizine  10 mg Oral Daily    famotidine  40 mg Oral Daily    hydrALAZINE  50 mg Oral Q8H    isosorbide dinitrate  10 mg Oral Q8H    magnesium oxide  800 mg Oral BID    magnesium sulfate IVPB  1 g Intravenous Once    melatonin  6 mg Oral Nightly    polyethylene glycol  17 g Oral Daily    senna-docusate 8.6-50 mg  2 tablet Oral BID    triamcinolone acetonide 0.5%   Topical (Top) TID     PRN Meds:albuterol, bisacodyL, Dextrose 10% Bolus, Dextrose 10% Bolus, glucagon (human recombinant), glucose, glucose, heparin (PORCINE), heparin (PORCINE), hepatitis B, hydrOXYzine HCl, insulin aspart U-100, pneumococcal vaccine, sodium chloride 0.9%, traMADol    Review of patient's allergies indicates:   Allergen Reactions    Iodine and iodide containing products      Objective:     Vital Signs (Most Recent):  Temp: 98.3 °F (36.8 °C) (02/01/20 0700)  Pulse: (!) 111 (02/01/20 1000)  Resp: (!) 26 (02/01/20 1000)  BP: 107/70 (02/01/20 1000)  SpO2: 100 % (02/01/20 1000) Vital Signs (24h Range):  Temp:  [97.4 °F (36.3 °C)-98.3 °F (36.8 °C)] 98.3 °F (36.8 °C)  Pulse:  [104-116] 111  Resp:  [13-38] 26  SpO2:  [96 %-100 %] 100  %  BP: ()/(54-89) 107/70     Patient Vitals for the past 72 hrs (Last 3 readings):   Weight   01/31/20 0300 85 kg (187 lb 6.3 oz)   01/30/20 0300 83.7 kg (184 lb 10.2 oz)     Body mass index is 29.35 kg/m².      Intake/Output Summary (Last 24 hours) at 2/1/2020 1029  Last data filed at 2/1/2020 1000  Gross per 24 hour   Intake 3645.21 ml   Output 3635 ml   Net 10.21 ml       Hemodynamic Parameters:       Physical Exam   Constitutional: He is oriented to person, place, and time. He appears well-developed and well-nourished.   HENT:   Head: Normocephalic and atraumatic.   Eyes: Pupils are equal, round, and reactive to light. EOM are normal.   Neck: No JVD present.   Cardiovascular: Normal rate, regular rhythm, normal heart sounds and intact distal pulses. Exam reveals no gallop and no friction rub.   No murmur heard.  Pulmonary/Chest: Effort normal and breath sounds normal. No respiratory distress. He has no wheezes. He has no rales.   Abdominal: Soft. Bowel sounds are normal. He exhibits no distension. There is no tenderness.   Musculoskeletal: Normal range of motion.   Neurological: He is oriented to person, place, and time. No cranial nerve deficit.   Skin: Skin is warm and dry. Capillary refill takes less than 2 seconds.   Psychiatric: He has a normal mood and affect. His behavior is normal.       Significant Labs:  CBC:  Recent Labs   Lab 01/30/20  0352 01/31/20  0409 02/01/20  0226   WBC 10.58 11.21 11.20   RBC 4.03* 3.97* 3.58*   HGB 10.7* 10.7* 9.7*   HCT 33.4* 33.7* 30.5*   * 417* 391*   MCV 83 85 85   MCH 26.6* 27.0 27.1   MCHC 32.0 31.8* 31.8*     BNP:  Recent Labs   Lab 01/27/20  1420   BNP 3,095*     CMP:  Recent Labs   Lab 01/30/20  0352  01/31/20  0409  01/31/20  1135 01/31/20  1507 01/31/20 2042 02/01/20 0226   *  --  183*  --   --   --   --  336*   CALCIUM 8.9  --  9.2  --   --   --   --  8.7   ALBUMIN 3.6  --  3.7  --   --   --   --  3.3*   PROT 7.5  --  7.9  --   --   --   --   7.0   *  128*   < > 129*  129*   < >  --  130* 128* 128*  128*  128*   K 3.7  3.7  --  3.7  3.7  --  4.0  --   --  3.8  3.8   CO2 29  --  28  --   --   --   --  24   CL 87*  --  89*  --   --   --   --  92*   BUN 45*  --  42*  --   --   --   --  39*   CREATININE 1.7*  --  1.6*  --   --   --   --  1.6*   ALKPHOS 94  --  98  --   --   --   --  90   ALT 24  --  23  --   --   --   --  19   AST 28  --  25  --   --   --   --  21   BILITOT 1.1*  --  1.1*  --   --   --   --  0.9    < > = values in this interval not displayed.      Coagulation:   Recent Labs   Lab 01/30/20  1720 01/31/20  0409 02/01/20  0226   APTT 46.5* 54.4* 68.9*  68.9*     LDH:  No results for input(s): LDH in the last 72 hours.  Microbiology:  Microbiology Results (last 7 days)     ** No results found for the last 168 hours. **          I have reviewed all pertinent labs within the past 24 hours.    Estimated Creatinine Clearance: 54.4 mL/min (A) (based on SCr of 1.6 mg/dL (H)).    Diagnostic Results:  I have reviewed and interpreted all pertinent imaging results/findings within the past 24 hours.    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 Chronic MARC - Class II-III and mild LE edema.      Hospital Course (synopsis of major diagnoses, care, treatment, and services provided during the course of the hospital stay):  -2/12 admit to CDU for diuresis with IV lasix  -IV abx   -CXR with suspected small left pleural effusion with associated left basilar atelectasis versus evolving airspace disease  -2/13 single chamber ICD implant; IV lasix decreased to BID  -2/16 add dobutamine, hold BB due to hypotension, no ACE-I or ARB due to recent HPI hypotension  -2/17 Lasix changed to PO  -2/18 dobutamine discontinued    Admitted to [facility] on Thursday  due to severe SOB for a week with associated orthopnea, MARC, and PND unable to lie flat and found to be in acute diastolic heart failure. States he normally weighs around 219 but up to 254lb on admission tonight. Says he hasn't been the most compliant in terms of fluid restriction and daily weights but has avoided salt. BNP on admission was 2375. BUN/CRT 32/1.4 He was started on IV diuretics but continued to deteriorate. Creatinine continued to increase and reached 4.3 with oliguria. He was started on CRRT for a few days and tolerated well. Renal u/s was negative for obstruction and liver u/s without findings of cirrhosis. All of this was progression of cardiorenal syndrome with liver congestion from severe volume overload. On arrival vitals stable and in no acute distress. He has obvious anasarca up to the chest. He did have uop of 500 at time of arrival also.    TTE 5/28/19  · Moderate left ventricular enlargement.  · Severely decreased left ventricular systolic function. The estimated ejection fraction is 20%  · Global hypokinetic wall motion.  · Grade III (severe) left ventricular diastolic dysfunction consistent with restrictive physiology.  · Severe left atrial enlargement.  · Moderate right ventricular enlargement.  · Low normal right ventricular systolic function.  · Mild right atrial enlargement.  · Moderate mitral regurgitation.  Mild to moderate tricuspid regurgitation    * Cardiogenic shock  -NICM; Likely Etoh induced  -Transferred from [facility] with IABP at 1:1 for further level of care. IABP removed 1/25  -Last 2D Echo 1/23/20: LVEF 20%, LVEDD 6.46 cm, RV mod dilated and mild to mod depressed, IVC 15  -RHC 1/27 on  5, Epi 0.04, Radha 20 ppm and Lasix 40 mg/hr: RA 12 (not correlating with CVP of 20 by PICC same day - readings likely spurious), PAP 38/22 (30), W 20 and CO/CI 4.11/2.06.   -Net -ve 3.2L past 24 hours on Lasix 20 mg/hr plus 15 mg Tolvaptan yesterday. Creat is down to 1.6.  Dosing  Tolvaptan today as Na+ remains 129.   -GDMT: Hold Hyd/Isordil after this evening  - He has completed the pathway and was presented at selection 1/29/20: It was the committee decision to decline the pt for transplant listing due to debility, poor renal function and lack of backup caregiver. Pt is deferred for LVAD at this time pending improvement in renal function and increase in physical activity.       Acute hyperglycemia  -HgA1C 6.6  -Endocrine following    Moderate malnutrition  - Boost glucose control added 1/27   - Prealbumin is 23    Morbid obesity  -Weight down 79# since admit with diuresis, making BMI now 29.7    ANJELICA (acute kidney injury)  -Creatinine is down to 1.6 today. Was 3.0 on admit and got as high as 4.3 at OSH prior to transfer. Renal function was normal 8 months ago.  -Cont to monitor with diuresis     Essential hypertension  -Currently essentially normotensive   -Continue Hyd/Isordil until this evening, then hold in anticipation of VAD    Deep vein thrombosis (DVT) of right lower extremity  -Unsure of timing but was on eliquis PTA.   -Continue on heparin gtt for now      AICD (automatic cardioverter/defibrillator) present  -Medtronic single chamber ICD placed 2019 for primary prevention      Case discussed with attending, Dr. Gr with final attestation to follow.      Tommy Cochran MD  Heart Transplant  [facility]

## 2020-02-01 NOTE — SUBJECTIVE & OBJECTIVE
Interval History: No acute events overnight. Denies chest pain, palpitations or shortness of breath.       Continuous Infusions:   DOBUTamine 5 mcg/kg/min (02/01/20 1000)    epinephrine infustion (NON-TITRATING) 0.04 mcg/kg/min (02/01/20 1000)    furosemide (LASIX) 10 mg/mL infusion (non-titrating) 20 mg/hr (02/01/20 1000)    heparin (porcine) in D5W 17 Units/kg/hr (02/01/20 1000)    nitric oxide gas       Scheduled Meds:   acetaminophen  1,000 mg Oral Q8H    atorvastatin  40 mg Oral Daily    cetirizine  10 mg Oral Daily    famotidine  40 mg Oral Daily    hydrALAZINE  50 mg Oral Q8H    isosorbide dinitrate  10 mg Oral Q8H    magnesium oxide  800 mg Oral BID    magnesium sulfate IVPB  1 g Intravenous Once    melatonin  6 mg Oral Nightly    polyethylene glycol  17 g Oral Daily    senna-docusate 8.6-50 mg  2 tablet Oral BID    triamcinolone acetonide 0.5%   Topical (Top) TID     PRN Meds:albuterol, bisacodyL, Dextrose 10% Bolus, Dextrose 10% Bolus, glucagon (human recombinant), glucose, glucose, heparin (PORCINE), heparin (PORCINE), hepatitis B, hydrOXYzine HCl, insulin aspart U-100, pneumococcal vaccine, sodium chloride 0.9%, traMADol    Review of patient's allergies indicates:   Allergen Reactions    Iodine and iodide containing products      Objective:     Vital Signs (Most Recent):  Temp: 98.3 °F (36.8 °C) (02/01/20 0700)  Pulse: (!) 111 (02/01/20 1000)  Resp: (!) 26 (02/01/20 1000)  BP: 107/70 (02/01/20 1000)  SpO2: 100 % (02/01/20 1000) Vital Signs (24h Range):  Temp:  [97.4 °F (36.3 °C)-98.3 °F (36.8 °C)] 98.3 °F (36.8 °C)  Pulse:  [104-116] 111  Resp:  [13-38] 26  SpO2:  [96 %-100 %] 100 %  BP: ()/(54-89) 107/70     Patient Vitals for the past 72 hrs (Last 3 readings):   Weight   01/31/20 0300 85 kg (187 lb 6.3 oz)   01/30/20 0300 83.7 kg (184 lb 10.2 oz)     Body mass index is 29.35 kg/m².      Intake/Output Summary (Last 24 hours) at 2/1/2020 1029  Last data filed at 2/1/2020  1000  Gross per 24 hour   Intake 3645.21 ml   Output 3635 ml   Net 10.21 ml       Hemodynamic Parameters:       Physical Exam   Constitutional: He is oriented to person, place, and time. He appears well-developed and well-nourished.   HENT:   Head: Normocephalic and atraumatic.   Eyes: Pupils are equal, round, and reactive to light. EOM are normal.   Neck: No JVD present.   Cardiovascular: Normal rate, regular rhythm, normal heart sounds and intact distal pulses. Exam reveals no gallop and no friction rub.   No murmur heard.  Pulmonary/Chest: Effort normal and breath sounds normal. No respiratory distress. He has no wheezes. He has no rales.   Abdominal: Soft. Bowel sounds are normal. He exhibits no distension. There is no tenderness.   Musculoskeletal: Normal range of motion.   Neurological: He is oriented to person, place, and time. No cranial nerve deficit.   Skin: Skin is warm and dry. Capillary refill takes less than 2 seconds.   Psychiatric: He has a normal mood and affect. His behavior is normal.       Significant Labs:  CBC:  Recent Labs   Lab 01/30/20  0352 01/31/20  0409 02/01/20 0226   WBC 10.58 11.21 11.20   RBC 4.03* 3.97* 3.58*   HGB 10.7* 10.7* 9.7*   HCT 33.4* 33.7* 30.5*   * 417* 391*   MCV 83 85 85   MCH 26.6* 27.0 27.1   MCHC 32.0 31.8* 31.8*     BNP:  Recent Labs   Lab 01/27/20  1420   BNP 3,095*     CMP:  Recent Labs   Lab 01/30/20  0352  01/31/20  0409  01/31/20  1135 01/31/20  1507 01/31/20  2042 02/01/20 0226   *  --  183*  --   --   --   --  336*   CALCIUM 8.9  --  9.2  --   --   --   --  8.7   ALBUMIN 3.6  --  3.7  --   --   --   --  3.3*   PROT 7.5  --  7.9  --   --   --   --  7.0   *  128*   < > 129*  129*   < >  --  130* 128* 128*  128*  128*   K 3.7  3.7  --  3.7  3.7  --  4.0  --   --  3.8  3.8   CO2 29  --  28  --   --   --   --  24   CL 87*  --  89*  --   --   --   --  92*   BUN 45*  --  42*  --   --   --   --  39*   CREATININE 1.7*  --  1.6*  --   --    --   --  1.6*   ALKPHOS 94  --  98  --   --   --   --  90   ALT 24  --  23  --   --   --   --  19   AST 28  --  25  --   --   --   --  21   BILITOT 1.1*  --  1.1*  --   --   --   --  0.9    < > = values in this interval not displayed.      Coagulation:   Recent Labs   Lab 01/30/20  1720 01/31/20  0409 02/01/20  0226   APTT 46.5* 54.4* 68.9*  68.9*     LDH:  No results for input(s): LDH in the last 72 hours.  Microbiology:  Microbiology Results (last 7 days)     ** No results found for the last 168 hours. **          I have reviewed all pertinent labs within the past 24 hours.    Estimated Creatinine Clearance: 54.4 mL/min (A) (based on SCr of 1.6 mg/dL (H)).    Diagnostic Results:  I have reviewed and interpreted all pertinent imaging results/findings within the past 24 hours.

## 2020-02-01 NOTE — ASSESSMENT & PLAN NOTE
-Currently essentially normotensive   -Continue Hyd/Isordil until this evening, then hold in anticipation of VAD

## 2020-02-01 NOTE — ASSESSMENT & PLAN NOTE
-NICM; Likely Etoh induced  -Transferred from [facility] with IABP at 1:1 for further level of care. IABP removed 1/25  -Last 2D Echo 1/23/20: LVEF 20%, LVEDD 6.46 cm, RV mod dilated and mild to mod depressed, IVC 15  -RHC 1/27 on  5, Epi 0.04, Radha 20 ppm and Lasix 40 mg/hr: RA 12 (not correlating with CVP of 20 by PICC same day - readings likely spurious), PAP 38/22 (30), W 20 and CO/CI 4.11/2.06.   -Net -ve 3.2L past 24 hours on Lasix 20 mg/hr plus 15 mg Tolvaptan yesterday. Creat is down to 1.6.  Dosing Tolvaptan today as Na+ remains 129.   -GDMT: Hold Hyd/Isordil after this evening  - He has completed the pathway and was presented at selection 1/29/20: It was the committee decision to decline the pt for transplant listing due to debility, poor renal function and lack of backup caregiver. Pt is deferred for LVAD at this time pending improvement in renal function and increase in physical activity.

## 2020-02-02 LAB
ALBUMIN SERPL BCP-MCNC: 3.6 G/DL (ref 3.5–5.2)
ALLENS TEST: ABNORMAL
ALP SERPL-CCNC: 93 U/L (ref 55–135)
ALT SERPL W/O P-5'-P-CCNC: 20 U/L (ref 10–44)
ANION GAP SERPL CALC-SCNC: 12 MMOL/L (ref 8–16)
ANION GAP SERPL CALC-SCNC: 13 MMOL/L (ref 8–16)
APTT BLDCRRT: 52.5 SEC (ref 21–32)
APTT BLDCRRT: 97.6 SEC (ref 21–32)
AST SERPL-CCNC: 25 U/L (ref 10–40)
BASOPHILS # BLD AUTO: 0.04 K/UL (ref 0–0.2)
BASOPHILS NFR BLD: 0.3 % (ref 0–1.9)
BILIRUB SERPL-MCNC: 1.1 MG/DL (ref 0.1–1)
BNP SERPL-MCNC: 3069 PG/ML (ref 0–99)
BUN SERPL-MCNC: 36 MG/DL (ref 6–20)
BUN SERPL-MCNC: 39 MG/DL (ref 6–20)
CALCIUM SERPL-MCNC: 9.3 MG/DL (ref 8.7–10.5)
CALCIUM SERPL-MCNC: 9.4 MG/DL (ref 8.7–10.5)
CHLORIDE SERPL-SCNC: 92 MMOL/L (ref 95–110)
CHLORIDE SERPL-SCNC: 95 MMOL/L (ref 95–110)
CO2 SERPL-SCNC: 24 MMOL/L (ref 23–29)
CO2 SERPL-SCNC: 25 MMOL/L (ref 23–29)
CREAT SERPL-MCNC: 1.5 MG/DL (ref 0.5–1.4)
CREAT SERPL-MCNC: 1.8 MG/DL (ref 0.5–1.4)
DELSYS: ABNORMAL
DELSYS: ABNORMAL
DIFFERENTIAL METHOD: ABNORMAL
DIGOXIN SERPL-MCNC: <0.1 NG/ML (ref 0.8–2)
EOSINOPHIL # BLD AUTO: 0.1 K/UL (ref 0–0.5)
EOSINOPHIL NFR BLD: 0.9 % (ref 0–8)
ERYTHROCYTE [DISTWIDTH] IN BLOOD BY AUTOMATED COUNT: 18.3 % (ref 11.5–14.5)
ERYTHROCYTE [SEDIMENTATION RATE] IN BLOOD BY WESTERGREN METHOD: 18 MM/H
EST. GFR  (AFRICAN AMERICAN): 47.9 ML/MIN/1.73 M^2
EST. GFR  (AFRICAN AMERICAN): 59.7 ML/MIN/1.73 M^2
EST. GFR  (NON AFRICAN AMERICAN): 41.4 ML/MIN/1.73 M^2
EST. GFR  (NON AFRICAN AMERICAN): 51.7 ML/MIN/1.73 M^2
FLOW: 4
FLOW: 4
GLUCOSE SERPL-MCNC: 154 MG/DL (ref 70–110)
GLUCOSE SERPL-MCNC: 209 MG/DL (ref 70–110)
HCO3 UR-SCNC: 27.3 MMOL/L (ref 24–28)
HCO3 UR-SCNC: 28.7 MMOL/L (ref 24–28)
HCO3 UR-SCNC: 29.2 MMOL/L (ref 24–28)
HCT VFR BLD AUTO: 31.9 % (ref 40–54)
HGB BLD-MCNC: 10.3 G/DL (ref 14–18)
IMM GRANULOCYTES # BLD AUTO: 0.11 K/UL (ref 0–0.04)
IMM GRANULOCYTES NFR BLD AUTO: 0.9 % (ref 0–0.5)
LYMPHOCYTES # BLD AUTO: 1.6 K/UL (ref 1–4.8)
LYMPHOCYTES NFR BLD: 13.2 % (ref 18–48)
MAGNESIUM SERPL-MCNC: 2.3 MG/DL (ref 1.6–2.6)
MCH RBC QN AUTO: 26.8 PG (ref 27–31)
MCHC RBC AUTO-ENTMCNC: 32.3 G/DL (ref 32–36)
MCV RBC AUTO: 83 FL (ref 82–98)
METHEMOGLOBIN: 0.3 % (ref 0–3)
MODE: ABNORMAL
MODE: ABNORMAL
MONOCYTES # BLD AUTO: 0.7 K/UL (ref 0.3–1)
MONOCYTES NFR BLD: 5.5 % (ref 4–15)
NEUTROPHILS # BLD AUTO: 9.7 K/UL (ref 1.8–7.7)
NEUTROPHILS NFR BLD: 79.2 % (ref 38–73)
NRBC BLD-RTO: 0 /100 WBC
PCO2 BLDA: 40.4 MMHG (ref 35–45)
PCO2 BLDA: 42.6 MMHG (ref 35–45)
PCO2 BLDA: 42.7 MMHG (ref 35–45)
PH SMN: 7.44 [PH] (ref 7.35–7.45)
PLATELET # BLD AUTO: 409 K/UL (ref 150–350)
PMV BLD AUTO: 10 FL (ref 9.2–12.9)
PO2 BLDA: 21 MMHG (ref 40–60)
PO2 BLDA: 26 MMHG (ref 40–60)
PO2 BLDA: 28 MMHG (ref 40–60)
POC BE: 3 MMOL/L
POC BE: 4 MMOL/L
POC BE: 5 MMOL/L
POC SATURATED O2: 38 % (ref 95–100)
POC SATURATED O2: 51 % (ref 95–100)
POC SATURATED O2: 54 % (ref 95–100)
POC TCO2: 29 MMOL/L (ref 24–29)
POC TCO2: 30 MMOL/L (ref 24–29)
POC TCO2: 30 MMOL/L (ref 24–29)
POCT GLUCOSE: 125 MG/DL (ref 70–110)
POCT GLUCOSE: 126 MG/DL (ref 70–110)
POCT GLUCOSE: 126 MG/DL (ref 70–110)
POCT GLUCOSE: 128 MG/DL (ref 70–110)
POCT GLUCOSE: 226 MG/DL (ref 70–110)
POTASSIUM SERPL-SCNC: 4.3 MMOL/L (ref 3.5–5.1)
PROT SERPL-MCNC: 7.7 G/DL (ref 6–8.4)
RBC # BLD AUTO: 3.84 M/UL (ref 4.6–6.2)
SAMPLE: ABNORMAL
SITE: ABNORMAL
SODIUM SERPL-SCNC: 129 MMOL/L (ref 136–145)
SODIUM SERPL-SCNC: 131 MMOL/L (ref 136–145)
SODIUM SERPL-SCNC: 132 MMOL/L (ref 136–145)
SODIUM SERPL-SCNC: 132 MMOL/L (ref 136–145)
SODIUM SERPL-SCNC: 135 MMOL/L (ref 136–145)
SP02: 100
SP02: 100
WBC # BLD AUTO: 12.19 K/UL (ref 3.9–12.7)

## 2020-02-02 PROCEDURE — 63600175 PHARM REV CODE 636 W HCPCS: Performed by: STUDENT IN AN ORGANIZED HEALTH CARE EDUCATION/TRAINING PROGRAM

## 2020-02-02 PROCEDURE — 63600175 PHARM REV CODE 636 W HCPCS: Performed by: INTERNAL MEDICINE

## 2020-02-02 PROCEDURE — 80053 COMPREHEN METABOLIC PANEL: CPT

## 2020-02-02 PROCEDURE — 94761 N-INVAS EAR/PLS OXIMETRY MLT: CPT

## 2020-02-02 PROCEDURE — 80162 ASSAY OF DIGOXIN TOTAL: CPT

## 2020-02-02 PROCEDURE — 84295 ASSAY OF SERUM SODIUM: CPT | Mod: 91

## 2020-02-02 PROCEDURE — 80048 BASIC METABOLIC PNL TOTAL CA: CPT

## 2020-02-02 PROCEDURE — 85025 COMPLETE CBC W/AUTO DIFF WBC: CPT

## 2020-02-02 PROCEDURE — 82803 BLOOD GASES ANY COMBINATION: CPT

## 2020-02-02 PROCEDURE — 85730 THROMBOPLASTIN TIME PARTIAL: CPT | Mod: 91

## 2020-02-02 PROCEDURE — 25000003 PHARM REV CODE 250: Performed by: INTERNAL MEDICINE

## 2020-02-02 PROCEDURE — 99900035 HC TECH TIME PER 15 MIN (STAT)

## 2020-02-02 PROCEDURE — 99233 SBSQ HOSP IP/OBS HIGH 50: CPT | Mod: ,,, | Performed by: INTERNAL MEDICINE

## 2020-02-02 PROCEDURE — 83050 HGB METHEMOGLOBIN QUAN: CPT

## 2020-02-02 PROCEDURE — 20000000 HC ICU ROOM

## 2020-02-02 PROCEDURE — 25000003 PHARM REV CODE 250: Performed by: STUDENT IN AN ORGANIZED HEALTH CARE EDUCATION/TRAINING PROGRAM

## 2020-02-02 PROCEDURE — 83735 ASSAY OF MAGNESIUM: CPT

## 2020-02-02 PROCEDURE — 27000221 HC OXYGEN, UP TO 24 HOURS

## 2020-02-02 PROCEDURE — 63600175 PHARM REV CODE 636 W HCPCS: Performed by: HOSPITALIST

## 2020-02-02 PROCEDURE — 63600367 HC NITRIC OXIDE PER HOUR

## 2020-02-02 PROCEDURE — 25000003 PHARM REV CODE 250: Performed by: NURSE PRACTITIONER

## 2020-02-02 PROCEDURE — 84295 ASSAY OF SERUM SODIUM: CPT

## 2020-02-02 PROCEDURE — 99233 PR SUBSEQUENT HOSPITAL CARE,LEVL III: ICD-10-PCS | Mod: ,,, | Performed by: INTERNAL MEDICINE

## 2020-02-02 PROCEDURE — 83880 ASSAY OF NATRIURETIC PEPTIDE: CPT

## 2020-02-02 PROCEDURE — 85730 THROMBOPLASTIN TIME PARTIAL: CPT

## 2020-02-02 RX ORDER — FUROSEMIDE 10 MG/ML
120 INJECTION INTRAMUSCULAR; INTRAVENOUS ONCE
Status: COMPLETED | OUTPATIENT
Start: 2020-02-02 | End: 2020-02-02

## 2020-02-02 RX ORDER — FUROSEMIDE 10 MG/ML
INJECTION INTRAMUSCULAR; INTRAVENOUS
Status: DISPENSED
Start: 2020-02-02 | End: 2020-02-03

## 2020-02-02 RX ORDER — FUROSEMIDE 10 MG/ML
120 INJECTION INTRAMUSCULAR; INTRAVENOUS EVERY 8 HOURS
Status: DISCONTINUED | OUTPATIENT
Start: 2020-02-02 | End: 2020-02-02

## 2020-02-02 RX ADMIN — TRIAMCINOLONE ACETONIDE: 5 CREAM TOPICAL at 10:02

## 2020-02-02 RX ADMIN — ACETAMINOPHEN 1000 MG: 500 TABLET ORAL at 06:02

## 2020-02-02 RX ADMIN — FAMOTIDINE 40 MG: 40 POWDER, FOR SUSPENSION ORAL at 08:02

## 2020-02-02 RX ADMIN — ACETAMINOPHEN 1000 MG: 500 TABLET ORAL at 02:02

## 2020-02-02 RX ADMIN — Medication 6 MG: at 10:02

## 2020-02-02 RX ADMIN — Medication 800 MG: at 10:02

## 2020-02-02 RX ADMIN — CETIRIZINE HYDROCHLORIDE 10 MG: 10 TABLET, FILM COATED ORAL at 08:02

## 2020-02-02 RX ADMIN — Medication 800 MG: at 08:02

## 2020-02-02 RX ADMIN — STANDARDIZED SENNA CONCENTRATE AND DOCUSATE SODIUM 2 TABLET: 8.6; 5 TABLET ORAL at 10:02

## 2020-02-02 RX ADMIN — TRIAMCINOLONE ACETONIDE: 5 CREAM TOPICAL at 08:02

## 2020-02-02 RX ADMIN — HEPARIN SODIUM AND DEXTROSE 17 UNITS/KG/HR: 10000; 5 INJECTION INTRAVENOUS at 02:02

## 2020-02-02 RX ADMIN — TRIAMCINOLONE ACETONIDE: 5 CREAM TOPICAL at 02:02

## 2020-02-02 RX ADMIN — INSULIN ASPART 2 UNITS: 100 INJECTION, SOLUTION INTRAVENOUS; SUBCUTANEOUS at 05:02

## 2020-02-02 RX ADMIN — ATORVASTATIN CALCIUM 40 MG: 20 TABLET, FILM COATED ORAL at 08:02

## 2020-02-02 RX ADMIN — EPINEPHRINE 0.04 MCG/KG/MIN: 1 INJECTION PARENTERAL at 02:02

## 2020-02-02 RX ADMIN — FUROSEMIDE 120 MG: 10 INJECTION, SOLUTION INTRAMUSCULAR; INTRAVENOUS at 05:02

## 2020-02-02 RX ADMIN — STANDARDIZED SENNA CONCENTRATE AND DOCUSATE SODIUM 2 TABLET: 8.6; 5 TABLET ORAL at 08:02

## 2020-02-02 NOTE — PLAN OF CARE
CMICU DAILY GOALS       A: Awake    RASS: Goal - RASS Goal: 0-->alert and calm  Actual - RASS (Mcmillan Agitation-Sedation Scale): 0-->alert and calm   Restraint necessity: Clinical Justification: Removing medical devices  B: Breath   SBT: NA   C: Coordinate A & B, analgesics/sedatives   Pain: managed    SAT: NA  D: Delirium   CAM-ICU: Overall CAM-ICU: Negative  E: Early Mobility   MOVE Screen: Pass   Activity: Activity Management: activity adjusted per tolerance  FAS: Feeding/Nutrition   Diet order: Diet/Nutrition Received: regular,   Fluid restriction: Fluid Requirement: 1000FR  T: Thrombus   DVT prophylaxis: VTE Required Core Measure: Pharmacological prophylaxis initiated/maintained  H: HOB Elevation   Head of Bed (HOB): HOB at 20-30 degrees  U: Ulcer Prophylaxis   GI: yes  G: Glucose control   uncontrolled    S: Skin   Bundle compliance: yes   Bathing/Skin Care: bath, chlorhexidine, bath, complete, dressed/undressed, linen changed Date: 2/1/20  B: Bowel Function   no issues   I: Indwelling Catheters   Mc necessity: [REMOVED]      Urethral Catheter 01/15/20 2030 Straight-tip 16 Fr.-Reason for Continuing Urinary Catheterization: Critically ill in ICU requiring intensive monitoring   CVC necessity: Yes   IPAD offered: Yes  D: De-escalation Antibx   No  Plan for the day   Diuresis   Family/Goals of care/Code Status   Code Status: Full Code     No acute events throughout day, VS and assessment per flow sheet, patient progressing towards goals as tolerated, plan of care reviewed with Saba Lott and family, all concerns addressed, will continue to monitor.

## 2020-02-02 NOTE — PLAN OF CARE
CMICU DAILY GOALS   SVO2 51.    A: Awake    RASS: Goal - RASS Goal: 0-->alert and calm  Actual - RASS (Mcmillan Agitation-Sedation Scale): 0-->alert and calm   Restraint necessity: Clinical Justification: Removing medical devices  B: Breath   SBT: Not intubated   C: Coordinate A & B, analgesics/sedatives   Pain: managed    SAT: Not intubated  D: Delirium   CAM-ICU: Overall CAM-ICU: Negative  E: Early Mobility   MOVE Screen: Pass   Activity: Activity Management: activity adjusted per tolerance, activity clustered for rest period  FAS: Feeding/Nutrition   Diet order: Diet/Nutrition Received: regular,   Fluid restriction: Fluid Requirement: 1000FR  T: Thrombus   DVT prophylaxis: VTE Required Core Measure: Pharmacological prophylaxis initiated/maintained  H: HOB Elevation   Head of Bed (HOB): HOB at 30-45 degrees  U: Ulcer Prophylaxis   GI: no  G: Glucose control   managed    S: Skin   Bundle compliance: yes   Bathing/Skin Care: bath, chlorhexidine, bath, complete, dressed/undressed, linen changed Date: 2/1  B: Bowel Function   no issues   I: Indwelling Catheters   Mc necessity: [REMOVED]      Urethral Catheter 01/15/20 2030 Straight-tip 16 Fr.-Reason for Continuing Urinary Catheterization: Critically ill in ICU requiring intensive monitoring   CVC necessity: Yes   IPAD offered: at bedside  D: De-escalation Antibx   Yes  Plan for the day   Monitor VS. Possibly get central line placed.   Family/Goals of care/Code Status   Code Status: Full Code     No acute events throughout day, VS and assessment per flow sheet, patient progressing towards goals as tolerated, plan of care reviewed with Saba Lott and family, all concerns addressed, will continue to monitor.

## 2020-02-02 NOTE — PLAN OF CARE
CMICU DAILY GOALS       A: Awake    RASS: Goal - RASS Goal: 0-->alert and calm  Actual - RASS (Mcmillan Agitation-Sedation Scale): 0-->alert and calm   Restraint necessity: Clinical Justification: Removing medical devices  B: Breath   SBT: NA   C: Coordinate A & B, analgesics/sedatives   Pain: managed    SAT: NA  D: Delirium   CAM-ICU: Overall CAM-ICU: Negative  E: Early Mobility   MOVE Screen: Pass   Activity: Activity Management: activity adjusted per tolerance  FAS: Feeding/Nutrition   Diet order: Diet/Nutrition Received: regular,   Fluid restriction: Fluid Requirement: 1000FR  T: Thrombus   DVT prophylaxis: VTE Required Core Measure: Pharmacological prophylaxis initiated/maintained  H: HOB Elevation   Head of Bed (HOB): HOB at 30-45 degrees  U: Ulcer Prophylaxis   GI: yes  G: Glucose control   managed    S: Skin   Bundle compliance: yes   Bathing/Skin Care: bath, chlorhexidine, bath, complete, dressed/undressed, linen changed Date: 2/2/20  B: Bowel Function   no issues   I: Indwelling Catheters   Mc necessity: [REMOVED]      Urethral Catheter 01/15/20 2030 Straight-tip 16 Fr.-Reason for Continuing Urinary Catheterization: Critically ill in ICU requiring intensive monitoring   CVC necessity: Yes   IPAD offered: Yes  D: De-escalation Antibx   No  Plan for the day   Monitor volume status, monitor serum sodium.   Family/Goals of care/Code Status   Code Status: Full Code     No acute events throughout day, VS and assessment per flow sheet, patient progressing towards goals as tolerated, plan of care reviewed with Saba Lott and family, all concerns addressed, will continue to monitor.

## 2020-02-02 NOTE — PROGRESS NOTES
[facility]  Heart Transplant  Progress Note    Patient Name: Saba Lott  MRN: [id number]  Admission Date: 1/15/2020  Hospital Length of Stay: 18 days  Attending Physician: Lian Daniel MD  Primary Care Provider: Primary Doctor No  Principal Problem:Cardiogenic shock    Subjective:     Interval History: No acute events, Denies chest pain, palpitations or shortness of breath.    Continuous Infusions:   DOBUTamine 5 mcg/kg/min (02/02/20 1100)    epinephrine infustion (NON-TITRATING) 0.04 mcg/kg/min (02/02/20 1100)    furosemide (LASIX) 10 mg/mL infusion (non-titrating) 20 mg/hr (02/02/20 1100)    heparin (porcine) in D5W 17.5 Units/kg/hr (02/02/20 1100)    nitric oxide gas       Scheduled Meds:   acetaminophen  1,000 mg Oral Q8H    atorvastatin  40 mg Oral Daily    cetirizine  10 mg Oral Daily    famotidine  40 mg Oral Daily    magnesium oxide  800 mg Oral BID    magnesium sulfate IVPB  1 g Intravenous Once    melatonin  6 mg Oral Nightly    polyethylene glycol  17 g Oral Daily    senna-docusate 8.6-50 mg  2 tablet Oral BID    triamcinolone acetonide 0.5%   Topical (Top) TID     PRN Meds:albuterol, bisacodyL, Dextrose 10% Bolus, Dextrose 10% Bolus, glucagon (human recombinant), glucose, glucose, heparin (PORCINE), heparin (PORCINE), hepatitis B, hydrOXYzine HCl, insulin aspart U-100, pneumococcal vaccine, sodium chloride 0.9%, traMADol    Review of patient's allergies indicates:   Allergen Reactions    Iodine and iodide containing products      Objective:     Vital Signs (Most Recent):  Temp: 98.3 °F (36.8 °C) (02/02/20 1100)  Pulse: (!) 111 (02/02/20 1100)  Resp: (!) 26 (02/02/20 1100)  BP: 122/66 (02/02/20 1100)  SpO2: 100 % (02/02/20 1100) Vital Signs (24h Range):  Temp:  [97.4 °F (36.3 °C)-98.6 °F (37 °C)] 98.3 °F (36.8 °C)  Pulse:  [103-219] 111  Resp:  [0-52] 26  SpO2:  [96 %-100 %] 100 %  BP: ()/(57-88) 122/66     Patient Vitals for the past 72 hrs (Last 3  readings):   Weight   02/02/20 0300 89.7 kg (197 lb 12 oz)   01/31/20 0300 85 kg (187 lb 6.3 oz)     Body mass index is 30.97 kg/m².      Intake/Output Summary (Last 24 hours) at 2/2/2020 1150  Last data filed at 2/2/2020 1100  Gross per 24 hour   Intake 3460.32 ml   Output 3715 ml   Net -254.68 ml       Hemodynamic Parameters:         Physical Exam   Constitutional: He is oriented to person, place, and time. He appears well-developed and well-nourished.   HENT:   Head: Normocephalic and atraumatic.   Eyes: Pupils are equal, round, and reactive to light. EOM are normal.   Neck: No JVD present.   Cardiovascular: Normal rate, regular rhythm, normal heart sounds and intact distal pulses. Exam reveals no gallop and no friction rub.   No murmur heard.  Pulmonary/Chest: Effort normal and breath sounds normal. No respiratory distress. He has no wheezes. He has no rales.   Abdominal: Soft. Bowel sounds are normal. He exhibits no distension. There is no tenderness.   Musculoskeletal: Normal range of motion.   Neurological: He is oriented to person, place, and time. No cranial nerve deficit.   Skin: Skin is warm and dry. Capillary refill takes less than 2 seconds.   Psychiatric: He has a normal mood and affect. His behavior is normal.       Significant Labs:  CBC:  Recent Labs   Lab 01/31/20  0409 02/01/20  0226 02/02/20  0342   WBC 11.21 11.20 12.19   RBC 3.97* 3.58* 3.84*   HGB 10.7* 9.7* 10.3*   HCT 33.7* 30.5* 31.9*   * 391* 409*   MCV 85 85 83   MCH 27.0 27.1 26.8*   MCHC 31.8* 31.8* 32.3     BNP:  Recent Labs   Lab 01/27/20  1420   BNP 3,095*     CMP:  Recent Labs   Lab 01/31/20  0409  01/31/20  1135  02/01/20  0226  02/02/20  0041 02/02/20  0342 02/02/20  0539   *  --   --   --  336*  --   --  154*  --    CALCIUM 9.2  --   --   --  8.7  --   --  9.4  --    ALBUMIN 3.7  --   --   --  3.3*  --   --  3.6  --    PROT 7.9  --   --   --  7.0  --   --  7.7  --    *  129*   < >  --    < > 128*  128*   128*   < > 131* 132*  132* 135*   K 3.7  3.7  --  4.0  --  3.8  3.8  --   --  4.3  4.3  --    CO2 28  --   --   --  24  --   --  25  --    CL 89*  --   --   --  92*  --   --  95  --    BUN 42*  --   --   --  39*  --   --  36*  --    CREATININE 1.6*  --   --   --  1.6*  --   --  1.5*  --    ALKPHOS 98  --   --   --  90  --   --  93  --    ALT 23  --   --   --  19  --   --  20  --    AST 25  --   --   --  21  --   --  25  --    BILITOT 1.1*  --   --   --  0.9  --   --  1.1*  --     < > = values in this interval not displayed.      Coagulation:   Recent Labs   Lab 02/01/20  1309 02/02/20  0342 02/02/20  0539   APTT 39.8* 97.6* 52.5*     LDH:  No results for input(s): LDH in the last 72 hours.  Microbiology:  Microbiology Results (last 7 days)     ** No results found for the last 168 hours. **          I have reviewed all pertinent labs within the past 24 hours.    Estimated Creatinine Clearance: 59.4 mL/min (A) (based on SCr of 1.5 mg/dL (H)).    Diagnostic Results:  I have reviewed and interpreted all pertinent imaging results/findings within the past 24 hours.    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 Chronic MARC - Class II-III and mild LE edema.      Hospital Course (synopsis of major diagnoses, care, treatment, and services provided during the course of the hospital stay):  -2/12 admit to CDU for diuresis with IV lasix  -IV abx   -CXR with suspected small left pleural effusion with associated left basilar atelectasis versus evolving airspace disease  -2/13 single chamber ICD implant; IV lasix decreased to BID  -2/16 add dobutamine, hold BB due to hypotension, no ACE-I or ARB due to recent HPI hypotension  -2/17 Lasix changed to PO  -2/18 dobutamine discontinued    Admitted to [facility] on Thursday due to  severe SOB for a week with associated orthopnea, MARC, and PND unable to lie flat and found to be in acute diastolic heart failure. States he normally weighs around 219 but up to 254lb on admission tonight. Says he hasn't been the most compliant in terms of fluid restriction and daily weights but has avoided salt. BNP on admission was 2375. BUN/CRT 32/1.4 He was started on IV diuretics but continued to deteriorate. Creatinine continued to increase and reached 4.3 with oliguria. He was started on CRRT for a few days and tolerated well. Renal u/s was negative for obstruction and liver u/s without findings of cirrhosis. All of this was progression of cardiorenal syndrome with liver congestion from severe volume overload. On arrival vitals stable and in no acute distress. He has obvious anasarca up to the chest. He did have uop of 500 at time of arrival also.    TTE 5/28/19  · Moderate left ventricular enlargement.  · Severely decreased left ventricular systolic function. The estimated ejection fraction is 20%  · Global hypokinetic wall motion.  · Grade III (severe) left ventricular diastolic dysfunction consistent with restrictive physiology.  · Severe left atrial enlargement.  · Moderate right ventricular enlargement.  · Low normal right ventricular systolic function.  · Mild right atrial enlargement.  · Moderate mitral regurgitation.  Mild to moderate tricuspid regurgitation    * Cardiogenic shock  -NICM; Likely Etoh induced  -Transferred from [facility] with IABP at 1:1 for further level of care. IABP removed 1/25  -Last 2D Echo 1/23/20: LVEF 20%, LVEDD 6.46 cm, RV mod dilated and mild to mod depressed, IVC 15  -RHC 1/27 on  5, Epi 0.04, Radha 20 ppm and Lasix 40 mg/hr: RA 12 (not correlating with CVP of 20 by PICC same day - readings likely spurious), PAP 38/22 (30), W 20 and CO/CI 4.11/2.06.   - s/p Tolvaptan 1/31, 2/1, holding additional given rise 130 to 135, recheck this evening  - continue Lasix  20mg/hr  -GDMT: Hold Hyd/Isordil in anticipation VAD  - He has completed the pathway and was presented at selection 1/29/20: It was the committee decision to decline the pt for transplant listing due to debility, poor renal function and lack of backup caregiver. Potential VAD placement as above      Acute hyperglycemia  -HgA1C 6.6  -Endocrine following    Moderate malnutrition  - Boost glucose control added 1/27   - Prealbumin is 23    Morbid obesity  -Weight down 79# since admit with diuresis, making BMI now 29.7    ANJELICA (acute kidney injury)  -Creatinine is down to 1.5 today. Was 3.0 on admit and got as high as 4.3 at OSH prior to transfer. Renal function was normal 8 months ago.  -Cont to monitor with diuresis     Essential hypertension  -Currently essentially normotensive   -Continue Hyd/Isordil until this evening, then hold in anticipation of VAD    Deep vein thrombosis (DVT) of right lower extremity  -Unsure of timing but was on eliquis PTA.   -Continue on heparin gtt for now      AICD (automatic cardioverter/defibrillator) present  -[employer]tronic single chamber ICD placed 2019 for primary prevention      Case discussed with attending, Dr. Gr with final attestation to follow.      Tommy Cochran MD  Heart Transplant  [facility]

## 2020-02-02 NOTE — ASSESSMENT & PLAN NOTE
-Creatinine is down to 1.5 today. Was 3.0 on admit and got as high as 4.3 at OSH prior to transfer. Renal function was normal 8 months ago.  -Cont to monitor with diuresis

## 2020-02-02 NOTE — SUBJECTIVE & OBJECTIVE
Interval History: No acute events, Denies chest pain, palpitations or shortness of breath.    Continuous Infusions:   DOBUTamine 5 mcg/kg/min (02/02/20 1100)    epinephrine infustion (NON-TITRATING) 0.04 mcg/kg/min (02/02/20 1100)    furosemide (LASIX) 10 mg/mL infusion (non-titrating) 20 mg/hr (02/02/20 1100)    heparin (porcine) in D5W 17.5 Units/kg/hr (02/02/20 1100)    nitric oxide gas       Scheduled Meds:   acetaminophen  1,000 mg Oral Q8H    atorvastatin  40 mg Oral Daily    cetirizine  10 mg Oral Daily    famotidine  40 mg Oral Daily    magnesium oxide  800 mg Oral BID    magnesium sulfate IVPB  1 g Intravenous Once    melatonin  6 mg Oral Nightly    polyethylene glycol  17 g Oral Daily    senna-docusate 8.6-50 mg  2 tablet Oral BID    triamcinolone acetonide 0.5%   Topical (Top) TID     PRN Meds:albuterol, bisacodyL, Dextrose 10% Bolus, Dextrose 10% Bolus, glucagon (human recombinant), glucose, glucose, heparin (PORCINE), heparin (PORCINE), hepatitis B, hydrOXYzine HCl, insulin aspart U-100, pneumococcal vaccine, sodium chloride 0.9%, traMADol    Review of patient's allergies indicates:   Allergen Reactions    Iodine and iodide containing products      Objective:     Vital Signs (Most Recent):  Temp: 98.3 °F (36.8 °C) (02/02/20 1100)  Pulse: (!) 111 (02/02/20 1100)  Resp: (!) 26 (02/02/20 1100)  BP: 122/66 (02/02/20 1100)  SpO2: 100 % (02/02/20 1100) Vital Signs (24h Range):  Temp:  [97.4 °F (36.3 °C)-98.6 °F (37 °C)] 98.3 °F (36.8 °C)  Pulse:  [103-219] 111  Resp:  [0-52] 26  SpO2:  [96 %-100 %] 100 %  BP: ()/(57-88) 122/66     Patient Vitals for the past 72 hrs (Last 3 readings):   Weight   02/02/20 0300 89.7 kg (197 lb 12 oz)   01/31/20 0300 85 kg (187 lb 6.3 oz)     Body mass index is 30.97 kg/m².      Intake/Output Summary (Last 24 hours) at 2/2/2020 1150  Last data filed at 2/2/2020 1100  Gross per 24 hour   Intake 3460.32 ml   Output 3715 ml   Net -254.68 ml       Hemodynamic  Parameters:         Physical Exam   Constitutional: He is oriented to person, place, and time. He appears well-developed and well-nourished.   HENT:   Head: Normocephalic and atraumatic.   Eyes: Pupils are equal, round, and reactive to light. EOM are normal.   Neck: No JVD present.   Cardiovascular: Normal rate, regular rhythm, normal heart sounds and intact distal pulses. Exam reveals no gallop and no friction rub.   No murmur heard.  Pulmonary/Chest: Effort normal and breath sounds normal. No respiratory distress. He has no wheezes. He has no rales.   Abdominal: Soft. Bowel sounds are normal. He exhibits no distension. There is no tenderness.   Musculoskeletal: Normal range of motion.   Neurological: He is oriented to person, place, and time. No cranial nerve deficit.   Skin: Skin is warm and dry. Capillary refill takes less than 2 seconds.   Psychiatric: He has a normal mood and affect. His behavior is normal.       Significant Labs:  CBC:  Recent Labs   Lab 01/31/20  0409 02/01/20 0226 02/02/20  0342   WBC 11.21 11.20 12.19   RBC 3.97* 3.58* 3.84*   HGB 10.7* 9.7* 10.3*   HCT 33.7* 30.5* 31.9*   * 391* 409*   MCV 85 85 83   MCH 27.0 27.1 26.8*   MCHC 31.8* 31.8* 32.3     BNP:  Recent Labs   Lab 01/27/20  1420   BNP 3,095*     CMP:  Recent Labs   Lab 01/31/20  0409  01/31/20  1135  02/01/20  0226  02/02/20  0041 02/02/20  0342 02/02/20  0539   *  --   --   --  336*  --   --  154*  --    CALCIUM 9.2  --   --   --  8.7  --   --  9.4  --    ALBUMIN 3.7  --   --   --  3.3*  --   --  3.6  --    PROT 7.9  --   --   --  7.0  --   --  7.7  --    *  129*   < >  --    < > 128*  128*  128*   < > 131* 132*  132* 135*   K 3.7  3.7  --  4.0  --  3.8  3.8  --   --  4.3  4.3  --    CO2 28  --   --   --  24  --   --  25  --    CL 89*  --   --   --  92*  --   --  95  --    BUN 42*  --   --   --  39*  --   --  36*  --    CREATININE 1.6*  --   --   --  1.6*  --   --  1.5*  --    ALKPHOS 98  --   --   --   90  --   --  93  --    ALT 23  --   --   --  19  --   --  20  --    AST 25  --   --   --  21  --   --  25  --    BILITOT 1.1*  --   --   --  0.9  --   --  1.1*  --     < > = values in this interval not displayed.      Coagulation:   Recent Labs   Lab 02/01/20  1309 02/02/20  0342 02/02/20  0539   APTT 39.8* 97.6* 52.5*     LDH:  No results for input(s): LDH in the last 72 hours.  Microbiology:  Microbiology Results (last 7 days)     ** No results found for the last 168 hours. **          I have reviewed all pertinent labs within the past 24 hours.    Estimated Creatinine Clearance: 59.4 mL/min (A) (based on SCr of 1.5 mg/dL (H)).    Diagnostic Results:  I have reviewed and interpreted all pertinent imaging results/findings within the past 24 hours.

## 2020-02-02 NOTE — CARE UPDATE
HTS Update:    Notified of patient feeling a bit more SOB than usual. BNP at 3K, which is about stable from 1/27/2020. Na down to 129 from 132 and crt has increased from 1.5 (this morning)  to 1.8. CXR looks a bit wetter than previous as well. SVo2 stable at 54. PICC unreliable per team and nursing.      Plan:   - Will push lasix 120mg IV q8Hrs for 2 total doses and re-eval  - Possible swan placement tomorrow so will hold off on central line for now      Discussed with Dr. Galvez.     Dominga Laboy MD  Cardiology Fellow  PGY 4  [id number] - [id number]

## 2020-02-02 NOTE — ASSESSMENT & PLAN NOTE
-NICM; Likely Etoh induced  -Transferred from [facility] with IABP at 1:1 for further level of care. IABP removed 1/25  -Last 2D Echo 1/23/20: LVEF 20%, LVEDD 6.46 cm, RV mod dilated and mild to mod depressed, IVC 15  -RHC 1/27 on  5, Epi 0.04, Radha 20 ppm and Lasix 40 mg/hr: RA 12 (not correlating with CVP of 20 by PICC same day - readings likely spurious), PAP 38/22 (30), W 20 and CO/CI 4.11/2.06.   - s/p Tolvaptan 1/31, 2/1, holding additional given rise 130 to 135, recheck this evening  - continue Lasix 20mg/hr  -GDMT: Hold Hyd/Isordil in anticipation VAD  - He has completed the pathway and was presented at selection 1/29/20: It was the committee decision to decline the pt for transplant listing due to debility, poor renal function and lack of backup caregiver. Potential VAD placement as above

## 2020-02-03 ENCOUNTER — CONFERENCE (OUTPATIENT)
Dept: TRANSPLANT | Facility: CLINIC | Age: 55
End: 2020-02-03

## 2020-02-03 PROBLEM — I10 ESSENTIAL HYPERTENSION: Status: RESOLVED | Noted: 2019-02-27 | Resolved: 2020-02-03

## 2020-02-03 LAB
ALBUMIN SERPL BCP-MCNC: 3.1 G/DL (ref 3.5–5.2)
ALLENS TEST: ABNORMAL
ALP SERPL-CCNC: 89 U/L (ref 55–135)
ALT SERPL W/O P-5'-P-CCNC: 22 U/L (ref 10–44)
ANION GAP SERPL CALC-SCNC: 15 MMOL/L (ref 8–16)
APTT BLDCRRT: 43.3 SEC (ref 21–32)
APTT BLDCRRT: >150 SEC (ref 21–32)
ASCENDING AORTA: 3.33 CM
AST SERPL-CCNC: 24 U/L (ref 10–40)
AV INDEX (PROSTH): 0.6
AV MEAN GRADIENT: 2 MMHG
AV PEAK GRADIENT: 2 MMHG
AV VALVE AREA: 1.84 CM2
AV VELOCITY RATIO: 0.67
B CELL RESULTS - XM AUTO: NEGATIVE
B MCS AVERAGE - XM AUTO: -7.4
BACTERIA #/AREA URNS AUTO: NORMAL /HPF
BASOPHILS # BLD AUTO: 0.06 K/UL (ref 0–0.2)
BASOPHILS NFR BLD: 0.5 % (ref 0–1.9)
BILIRUB SERPL-MCNC: 1.3 MG/DL (ref 0.1–1)
BILIRUB UR QL STRIP: NEGATIVE
BSA FOR ECHO PROCEDURE: 2.07 M2
BUN SERPL-MCNC: 39 MG/DL (ref 6–20)
CALCIUM SERPL-MCNC: 8.5 MG/DL (ref 8.7–10.5)
CHLORIDE SERPL-SCNC: 89 MMOL/L (ref 95–110)
CLARITY UR REFRACT.AUTO: CLEAR
CO2 SERPL-SCNC: 22 MMOL/L (ref 23–29)
COLOR UR AUTO: YELLOW
CREAT SERPL-MCNC: 1.6 MG/DL (ref 0.5–1.4)
CV ECHO LV RWT: 0.26 CM
DELSYS: ABNORMAL
DIFFERENTIAL METHOD: ABNORMAL
DOP CALC AO PEAK VEL: 0.79 M/S
DOP CALC AO VTI: 11.52 CM
DOP CALC LVOT AREA: 3 CM2
DOP CALC LVOT DIAMETER: 1.97 CM
DOP CALC LVOT PEAK VEL: 0.53 M/S
DOP CALC LVOT STROKE VOLUME: 21.17 CM3
DOP CALCLVOT PEAK VEL VTI: 6.95 CM
ECHO LV POSTERIOR WALL: 0.84 CM (ref 0.6–1.1)
EOSINOPHIL # BLD AUTO: 0.2 K/UL (ref 0–0.5)
EOSINOPHIL NFR BLD: 1.3 % (ref 0–8)
ERYTHROCYTE [DISTWIDTH] IN BLOOD BY AUTOMATED COUNT: 18.2 % (ref 11.5–14.5)
EST. GFR  (AFRICAN AMERICAN): 55.2 ML/MIN/1.73 M^2
EST. GFR  (NON AFRICAN AMERICAN): 47.8 ML/MIN/1.73 M^2
FIO2: 36
FLOW: 4
FLOW: 4
FRACTIONAL SHORTENING: 5 % (ref 28–44)
FXMAU TESTING DATE: NORMAL
GLUCOSE SERPL-MCNC: 298 MG/DL (ref 70–110)
GLUCOSE UR QL STRIP: NEGATIVE
HCO3 UR-SCNC: 25.7 MMOL/L (ref 24–28)
HCO3 UR-SCNC: 26.6 MMOL/L (ref 24–28)
HCO3 UR-SCNC: 26.9 MMOL/L (ref 24–28)
HCT VFR BLD AUTO: 30.1 % (ref 40–54)
HGB BLD-MCNC: 9.5 G/DL (ref 14–18)
HGB UR QL STRIP: NEGATIVE
HLA AB QL: NEGATIVE
HLA AB SERPL: NEGATIVE
HLATY INTERPRETATION: NORMAL
IMM GRANULOCYTES # BLD AUTO: 0.16 K/UL (ref 0–0.04)
IMM GRANULOCYTES NFR BLD AUTO: 1.4 % (ref 0–0.5)
INTERVENTRICULAR SEPTUM: 0.72 CM (ref 0.6–1.1)
KETONES UR QL STRIP: NEGATIVE
LA MAJOR: 6.72 CM
LA MINOR: 5.39 CM
LA WIDTH: 4.65 CM
LEFT ATRIUM SIZE: 4.65 CM
LEFT ATRIUM VOLUME INDEX: 54.4 ML/M2
LEFT ATRIUM VOLUME: 109.94 CM3
LEFT INTERNAL DIMENSION IN SYSTOLE: 6.18 CM (ref 2.1–4)
LEFT VENTRICLE DIASTOLIC VOLUME INDEX: 107.45 ML/M2
LEFT VENTRICLE DIASTOLIC VOLUME: 217.28 ML
LEFT VENTRICLE MASS INDEX: 103 G/M2
LEFT VENTRICLE SYSTOLIC VOLUME INDEX: 95.2 ML/M2
LEFT VENTRICLE SYSTOLIC VOLUME: 192.49 ML
LEFT VENTRICULAR INTERNAL DIMENSION IN DIASTOLE: 6.52 CM (ref 3.5–6)
LEFT VENTRICULAR MASS: 208.89 G
LEUKOCYTE ESTERASE UR QL STRIP: ABNORMAL
LYMPHOCYTES # BLD AUTO: 1.7 K/UL (ref 1–4.8)
LYMPHOCYTES NFR BLD: 14.7 % (ref 18–48)
MAGNESIUM SERPL-MCNC: 2.2 MG/DL (ref 1.6–2.6)
MAGNESIUM SERPL-MCNC: 2.3 MG/DL (ref 1.6–2.6)
MCH RBC QN AUTO: 26.6 PG (ref 27–31)
MCHC RBC AUTO-ENTMCNC: 31.6 G/DL (ref 32–36)
MCV RBC AUTO: 84 FL (ref 82–98)
METHEMOGLOBIN: 0.2 % (ref 0–3)
MICROSCOPIC COMMENT: NORMAL
MODE: ABNORMAL
MONOCYTES # BLD AUTO: 0.7 K/UL (ref 0.3–1)
MONOCYTES NFR BLD: 6 % (ref 4–15)
NEUTROPHILS # BLD AUTO: 8.9 K/UL (ref 1.8–7.7)
NEUTROPHILS NFR BLD: 76.1 % (ref 38–73)
NITRITE UR QL STRIP: NEGATIVE
NRBC BLD-RTO: 1 /100 WBC
PCO2 BLDA: 41 MMHG (ref 35–45)
PCO2 BLDA: 42.1 MMHG (ref 35–45)
PCO2 BLDA: 44.7 MMHG (ref 35–45)
PH SMN: 7.39 [PH] (ref 7.35–7.45)
PH SMN: 7.41 [PH] (ref 7.35–7.45)
PH SMN: 7.41 [PH] (ref 7.35–7.45)
PH UR STRIP: 5 [PH] (ref 5–8)
PISA TR MAX VEL: 2.4 M/S
PLATELET # BLD AUTO: 380 K/UL (ref 150–350)
PMV BLD AUTO: 11.4 FL (ref 9.2–12.9)
PO2 BLDA: 25 MMHG (ref 40–60)
PO2 BLDA: 26 MMHG (ref 40–60)
PO2 BLDA: 26 MMHG (ref 40–60)
POC BE: 1 MMOL/L
POC BE: 2 MMOL/L
POC BE: 2 MMOL/L
POC SATURATED O2: 45 % (ref 95–100)
POC SATURATED O2: 46 % (ref 95–100)
POC SATURATED O2: 48 % (ref 95–100)
POC TCO2: 27 MMOL/L (ref 24–29)
POC TCO2: 28 MMOL/L (ref 24–29)
POC TCO2: 28 MMOL/L (ref 24–29)
POCT GLUCOSE: 129 MG/DL (ref 70–110)
POCT GLUCOSE: 131 MG/DL (ref 70–110)
POCT GLUCOSE: 202 MG/DL (ref 70–110)
POCT GLUCOSE: 378 MG/DL (ref 70–110)
POTASSIUM SERPL-SCNC: 3.9 MMOL/L (ref 3.5–5.1)
POTASSIUM SERPL-SCNC: 3.9 MMOL/L (ref 3.5–5.1)
POTASSIUM SERPL-SCNC: 4.4 MMOL/L (ref 3.5–5.1)
PROT SERPL-MCNC: 6.7 G/DL (ref 6–8.4)
PROT UR QL STRIP: NEGATIVE
RA MAJOR: 6.11 CM
RA PRESSURE: 15 MMHG
RA WIDTH: 4.63 CM
RBC # BLD AUTO: 3.57 M/UL (ref 4.6–6.2)
RBC #/AREA URNS AUTO: 1 /HPF (ref 0–4)
RIGHT VENTRICULAR END-DIASTOLIC DIMENSION: 4.2 CM
SAMPLE: ABNORMAL
SCRFL TESTING DATE: NORMAL
SERUM COLLECTION DT - XM AUTO: NORMAL
SERUM COLLECTION DT: NORMAL
SINUS: 3.08 CM
SITE: ABNORMAL
SODIUM SERPL-SCNC: 126 MMOL/L (ref 136–145)
SP GR UR STRIP: 1.01 (ref 1–1.03)
SQUAMOUS #/AREA URNS AUTO: 0 /HPF
STJ: 3.25 CM
T CELL RESULTS - XM AUTO: NEGATIVE
T MCS AVERAGE - XM AUTO: -16.5
TDI LATERAL: 0.14 M/S
TDI SEPTAL: 0.07 M/S
TDI: 0.11 M/S
TR MAX PG: 23 MMHG
TRICUSPID ANNULAR PLANE SYSTOLIC EXCURSION: 1.3 CM
TV REST PULMONARY ARTERY PRESSURE: 38 MMHG
URN SPEC COLLECT METH UR: ABNORMAL
WBC # BLD AUTO: 11.67 K/UL (ref 3.9–12.7)
WBC #/AREA URNS AUTO: 3 /HPF (ref 0–5)

## 2020-02-03 PROCEDURE — 63600175 PHARM REV CODE 636 W HCPCS: Performed by: STUDENT IN AN ORGANIZED HEALTH CARE EDUCATION/TRAINING PROGRAM

## 2020-02-03 PROCEDURE — C1751 CATH, INF, PER/CENT/MIDLINE: HCPCS

## 2020-02-03 PROCEDURE — 82803 BLOOD GASES ANY COMBINATION: CPT

## 2020-02-03 PROCEDURE — 63600367 HC NITRIC OXIDE PER HOUR

## 2020-02-03 PROCEDURE — 63600175 PHARM REV CODE 636 W HCPCS: Performed by: INTERNAL MEDICINE

## 2020-02-03 PROCEDURE — 27200234 HC IABP BALLOON

## 2020-02-03 PROCEDURE — 25000003 PHARM REV CODE 250: Performed by: NURSE PRACTITIONER

## 2020-02-03 PROCEDURE — 99292 CRITICAL CARE ADDL 30 MIN: CPT | Mod: ,,, | Performed by: INTERNAL MEDICINE

## 2020-02-03 PROCEDURE — 25000003 PHARM REV CODE 250: Performed by: STUDENT IN AN ORGANIZED HEALTH CARE EDUCATION/TRAINING PROGRAM

## 2020-02-03 PROCEDURE — 83735 ASSAY OF MAGNESIUM: CPT | Mod: 91

## 2020-02-03 PROCEDURE — 83735 ASSAY OF MAGNESIUM: CPT

## 2020-02-03 PROCEDURE — 94761 N-INVAS EAR/PLS OXIMETRY MLT: CPT

## 2020-02-03 PROCEDURE — 25000003 PHARM REV CODE 250: Performed by: INTERNAL MEDICINE

## 2020-02-03 PROCEDURE — 27100094 HC IABP, SET-UP

## 2020-02-03 PROCEDURE — 80053 COMPREHEN METABOLIC PANEL: CPT

## 2020-02-03 PROCEDURE — 20000000 HC ICU ROOM

## 2020-02-03 PROCEDURE — 99900035 HC TECH TIME PER 15 MIN (STAT)

## 2020-02-03 PROCEDURE — 33967 INSERT I-AORT PERCUT DEVICE: CPT

## 2020-02-03 PROCEDURE — C1894 INTRO/SHEATH, NON-LASER: HCPCS

## 2020-02-03 PROCEDURE — 63600175 PHARM REV CODE 636 W HCPCS: Performed by: HOSPITALIST

## 2020-02-03 PROCEDURE — 83050 HGB METHEMOGLOBIN QUAN: CPT

## 2020-02-03 PROCEDURE — 85730 THROMBOPLASTIN TIME PARTIAL: CPT

## 2020-02-03 PROCEDURE — 27000221 HC OXYGEN, UP TO 24 HOURS

## 2020-02-03 PROCEDURE — 25000003 PHARM REV CODE 250

## 2020-02-03 PROCEDURE — 85025 COMPLETE CBC W/AUTO DIFF WBC: CPT

## 2020-02-03 PROCEDURE — 99292 PR CRITICAL CARE, ADDL 30 MIN: ICD-10-PCS | Mod: ,,, | Performed by: INTERNAL MEDICINE

## 2020-02-03 PROCEDURE — 85730 THROMBOPLASTIN TIME PARTIAL: CPT | Mod: 91

## 2020-02-03 PROCEDURE — 84132 ASSAY OF SERUM POTASSIUM: CPT

## 2020-02-03 PROCEDURE — 27200188 HC TRANSDUCER, ART ADULT/PEDS

## 2020-02-03 PROCEDURE — 99291 CRITICAL CARE FIRST HOUR: CPT | Mod: ,,, | Performed by: NURSE PRACTITIONER

## 2020-02-03 PROCEDURE — 87040 BLOOD CULTURE FOR BACTERIA: CPT

## 2020-02-03 PROCEDURE — 93308 TTE F-UP OR LMTD: CPT

## 2020-02-03 PROCEDURE — 36556 INSERT NON-TUNNEL CV CATH: CPT

## 2020-02-03 PROCEDURE — 99291 PR CRITICAL CARE, E/M 30-74 MINUTES: ICD-10-PCS | Mod: ,,, | Performed by: NURSE PRACTITIONER

## 2020-02-03 PROCEDURE — 81001 URINALYSIS AUTO W/SCOPE: CPT

## 2020-02-03 RX ORDER — FUROSEMIDE 10 MG/ML
40 INJECTION INTRAMUSCULAR; INTRAVENOUS ONCE
Status: DISCONTINUED | OUTPATIENT
Start: 2020-02-03 | End: 2020-02-03

## 2020-02-03 RX ORDER — FUROSEMIDE 10 MG/ML
80 INJECTION INTRAMUSCULAR; INTRAVENOUS ONCE
Status: DISCONTINUED | OUTPATIENT
Start: 2020-02-03 | End: 2020-02-03

## 2020-02-03 RX ORDER — LIDOCAINE HYDROCHLORIDE 10 MG/ML
INJECTION, SOLUTION EPIDURAL; INFILTRATION; INTRACAUDAL; PERINEURAL
Status: COMPLETED
Start: 2020-02-03 | End: 2020-02-03

## 2020-02-03 RX ORDER — VANCOMYCIN 2 GRAM/500 ML IN 0.9 % SODIUM CHLORIDE INTRAVENOUS
2000
Status: DISCONTINUED | OUTPATIENT
Start: 2020-02-04 | End: 2020-02-03

## 2020-02-03 RX ORDER — TOLVAPTAN 15 MG/1
15 TABLET ORAL ONCE
Status: COMPLETED | OUTPATIENT
Start: 2020-02-03 | End: 2020-02-03

## 2020-02-03 RX ORDER — FUROSEMIDE 10 MG/ML
80 INJECTION INTRAMUSCULAR; INTRAVENOUS ONCE
Status: COMPLETED | OUTPATIENT
Start: 2020-02-03 | End: 2020-02-03

## 2020-02-03 RX ORDER — VANCOMYCIN HCL IN 5 % DEXTROSE 1G/250ML
1000 PLASTIC BAG, INJECTION (ML) INTRAVENOUS ONCE
Status: COMPLETED | OUTPATIENT
Start: 2020-02-03 | End: 2020-02-03

## 2020-02-03 RX ADMIN — FAMOTIDINE 40 MG: 40 POWDER, FOR SUSPENSION ORAL at 08:02

## 2020-02-03 RX ADMIN — STANDARDIZED SENNA CONCENTRATE AND DOCUSATE SODIUM 2 TABLET: 8.6; 5 TABLET ORAL at 09:02

## 2020-02-03 RX ADMIN — DOBUTAMINE IN DEXTROSE 5 MCG/KG/MIN: 200 INJECTION, SOLUTION INTRAVENOUS at 07:02

## 2020-02-03 RX ADMIN — TRIAMCINOLONE ACETONIDE: 5 CREAM TOPICAL at 08:02

## 2020-02-03 RX ADMIN — CETIRIZINE HYDROCHLORIDE 10 MG: 10 TABLET, FILM COATED ORAL at 08:02

## 2020-02-03 RX ADMIN — ALBUTEROL SULFATE 2 PUFF: 90 AEROSOL, METERED RESPIRATORY (INHALATION) at 07:02

## 2020-02-03 RX ADMIN — HEPARIN SODIUM AND DEXTROSE 17 UNITS/KG/HR: 10000; 5 INJECTION INTRAVENOUS at 05:02

## 2020-02-03 RX ADMIN — EPINEPHRINE 0.04 MCG/KG/MIN: 1 INJECTION PARENTERAL at 11:02

## 2020-02-03 RX ADMIN — Medication 800 MG: at 09:02

## 2020-02-03 RX ADMIN — HUMAN ALBUMIN MICROSPHERES AND PERFLUTREN 0.66 MG: 10; .22 INJECTION, SOLUTION INTRAVENOUS at 02:02

## 2020-02-03 RX ADMIN — VANCOMYCIN HYDROCHLORIDE 1000 MG: 1 INJECTION, POWDER, LYOPHILIZED, FOR SOLUTION INTRAVENOUS at 05:02

## 2020-02-03 RX ADMIN — FUROSEMIDE 30 MG/HR: 10 INJECTION, SOLUTION INTRAMUSCULAR; INTRAVENOUS at 12:02

## 2020-02-03 RX ADMIN — LIDOCAINE HYDROCHLORIDE 50 MG: 10 INJECTION, SOLUTION EPIDURAL; INFILTRATION; INTRACAUDAL; PERINEURAL at 09:02

## 2020-02-03 RX ADMIN — TRAMADOL HYDROCHLORIDE 50 MG: 50 TABLET, FILM COATED ORAL at 09:02

## 2020-02-03 RX ADMIN — ATORVASTATIN CALCIUM 40 MG: 20 TABLET, FILM COATED ORAL at 08:02

## 2020-02-03 RX ADMIN — Medication 6 MG: at 09:02

## 2020-02-03 RX ADMIN — FUROSEMIDE 80 MG: 10 INJECTION, SOLUTION INTRAMUSCULAR; INTRAVENOUS at 04:02

## 2020-02-03 RX ADMIN — FUROSEMIDE 30 MG/HR: 10 INJECTION, SOLUTION INTRAMUSCULAR; INTRAVENOUS at 05:02

## 2020-02-03 RX ADMIN — TOLVAPTAN 15 MG: 15 TABLET ORAL at 04:02

## 2020-02-03 RX ADMIN — ACETAMINOPHEN 1000 MG: 500 TABLET ORAL at 05:02

## 2020-02-03 RX ADMIN — TRIAMCINOLONE ACETONIDE: 5 CREAM TOPICAL at 09:02

## 2020-02-03 RX ADMIN — Medication 800 MG: at 08:02

## 2020-02-03 RX ADMIN — INSULIN ASPART 2 UNITS: 100 INJECTION, SOLUTION INTRAVENOUS; SUBCUTANEOUS at 12:02

## 2020-02-03 RX ADMIN — ACETAMINOPHEN 1000 MG: 500 TABLET ORAL at 01:02

## 2020-02-03 RX ADMIN — ALBUTEROL SULFATE 2 PUFF: 90 AEROSOL, METERED RESPIRATORY (INHALATION) at 03:02

## 2020-02-03 RX ADMIN — ACETAMINOPHEN 1000 MG: 500 TABLET ORAL at 09:02

## 2020-02-03 NOTE — SUBJECTIVE & OBJECTIVE
Interval History: No acute events, Denies chest pain, palpitations or shortness of breath this morning. States that he got dyspneic last night but it improved after lasix increased. Lying flat in bed this am.     Continuous Infusions:   DOBUTamine 5 mcg/kg/min (02/03/20 1000)    epinephrine infustion (NON-TITRATING) 0.04 mcg/kg/min (02/03/20 1000)    furosemide (LASIX) 10 mg/mL infusion (non-titrating) 30 mg/hr (02/03/20 1000)    heparin (porcine) in D5W Stopped (02/03/20 0900)    nitric oxide gas       Scheduled Meds:   acetaminophen  1,000 mg Oral Q8H    atorvastatin  40 mg Oral Daily    cetirizine  10 mg Oral Daily    famotidine  40 mg Oral Daily    magnesium oxide  800 mg Oral BID    magnesium sulfate IVPB  1 g Intravenous Once    melatonin  6 mg Oral Nightly    polyethylene glycol  17 g Oral Daily    senna-docusate 8.6-50 mg  2 tablet Oral BID    triamcinolone acetonide 0.5%   Topical (Top) TID     PRN Meds:albuterol, bisacodyL, Dextrose 10% Bolus, Dextrose 10% Bolus, glucagon (human recombinant), glucose, glucose, heparin (PORCINE), heparin (PORCINE), hepatitis B, hydrOXYzine HCl, insulin aspart U-100, pneumococcal vaccine, sodium chloride 0.9%, traMADol    Review of patient's allergies indicates:   Allergen Reactions    Iodine and iodide containing products      Objective:     Vital Signs (Most Recent):  Temp: 97.4 °F (36.3 °C) (02/03/20 0700)  Pulse: 108 (02/03/20 1000)  Resp: (!) 22 (02/03/20 1000)  BP: 95/73 (02/03/20 1000)  SpO2: 100 % (02/03/20 1000) Vital Signs (24h Range):  Temp:  [97.4 °F (36.3 °C)-98.2 °F (36.8 °C)] 97.4 °F (36.3 °C)  Pulse:  [104-180] 108  Resp:  [21-39] 22  SpO2:  [92 %-100 %] 100 %  BP: ()/(60-95) 95/73     Patient Vitals for the past 72 hrs (Last 3 readings):   Weight   02/03/20 0300 91.1 kg (200 lb 13.4 oz)   02/02/20 0300 89.7 kg (197 lb 12 oz)     Body mass index is 31.46 kg/m².      Intake/Output Summary (Last 24 hours) at 2/3/2020 1113  Last data filed  at 2/3/2020 1000  Gross per 24 hour   Intake 1806.61 ml   Output 2202 ml   Net -395.39 ml        Physical Exam   Constitutional: He is oriented to person, place, and time. He appears well-developed and well-nourished.   HENT:   Head: Normocephalic and atraumatic.   Eyes: Pupils are equal, round, and reactive to light. EOM are normal.   Neck: JVD present.   Cardiovascular: Normal rate and regular rhythm.   Murmur heard.  Pulmonary/Chest: Effort normal and breath sounds normal. No respiratory distress. He has no wheezes. He has no rales.   Abdominal: Soft. Bowel sounds are normal. He exhibits distension. There is no tenderness.   Musculoskeletal: Normal range of motion.   Neurological: He is oriented to person, place, and time. No cranial nerve deficit.   Skin: Skin is warm and dry. Capillary refill takes less than 2 seconds.   Psychiatric: He has a normal mood and affect. His behavior is normal.       Significant Labs:  CBC:  Recent Labs   Lab 02/01/20 0226 02/02/20  0342 02/03/20  0317   WBC 11.20 12.19 11.67   RBC 3.58* 3.84* 3.57*   HGB 9.7* 10.3* 9.5*   HCT 30.5* 31.9* 30.1*   * 409* 380*   MCV 85 83 84   MCH 27.1 26.8* 26.6*   MCHC 31.8* 32.3 31.6*     BNP:  Recent Labs   Lab 01/27/20  1420 02/02/20  1511   BNP 3,095* 3,069*     CMP:  Recent Labs   Lab 02/01/20  0226  02/02/20  0342 02/02/20  0539 02/02/20  1511 02/03/20  0317   *  --  154*  --  209* 298*   CALCIUM 8.7  --  9.4  --  9.3 8.5*   ALBUMIN 3.3*  --  3.6  --   --  3.1*   PROT 7.0  --  7.7  --   --  6.7   *  128*  128*   < > 132*  132* 135* 129* 126*   K 3.8  3.8  --  4.3  4.3  --  4.3 3.9  3.9   CO2 24  --  25  --  24 22*   CL 92*  --  95  --  92* 89*   BUN 39*  --  36*  --  39* 39*   CREATININE 1.6*  --  1.5*  --  1.8* 1.6*   ALKPHOS 90  --  93  --   --  89   ALT 19  --  20  --   --  22   AST 21  --  25  --   --  24   BILITOT 0.9  --  1.1*  --   --  1.3*    < > = values in this interval not displayed.      Coagulation:    Recent Labs   Lab 02/02/20  0539 02/03/20  0317 02/03/20 0540   APTT 52.5* >150.0* 43.3*     LDH:  No results for input(s): LDH in the last 72 hours.  Microbiology:  Microbiology Results (last 7 days)     Procedure Component Value Units Date/Time    Blood culture [[id number]] Collected:  02/03/20 0946    Order Status:  Sent Specimen:  Blood from Peripheral, Hand, Right Updated:  02/03/20 0954    Blood culture [[id number]] Collected:  02/03/20 0946    Order Status:  Sent Specimen:  Blood from Peripheral, Hand, Right Updated:  02/03/20 0953        I have reviewed all pertinent labs within the past 24 hours.    Estimated Creatinine Clearance: 56.2 mL/min (A) (based on SCr of 1.6 mg/dL (H)).    Diagnostic Results:  I have reviewed and interpreted all pertinent imaging results/findings within the past 24 hours.

## 2020-02-03 NOTE — PLAN OF CARE
CMICU DAILY GOALS     SVO2 48.   A: Awake    RASS: Goal - RASS Goal: 0-->alert and calm  Actual - RASS (M[place]illan Agitation-Sedation Scale): 0-->alert and calm   Restraint necessity: Clinical Justification: Removing medical devices  B: Breath   SBT: Not intubated   C: Coordinate A & B, analgesics/sedatives   Pain: managed    SAT: Not intubated  D: Delirium   CAM-ICU: Overall CAM-ICU: Negative  E: Early Mobility   MOVE Screen: Fail   Activity: Activity Management: activity adjusted per tolerance, activity clustered for rest period  FAS: Feeding/Nutrition   Diet order: Diet/Nutrition Received: regular,   Fluid restriction: Fluid Requirement: 1000FR  T: Thrombus   DVT prophylaxis: VTE Required Core Measure: Pharmacological prophylaxis initiated/maintained  H: HOB Elevation   Head of Bed (HOB): HOB at 20-30 degrees  U: Ulcer Prophylaxis   GI: yes  G: Glucose control   managed    S: Skin   Bundle compliance: yes   Bathing/Skin Care: bath, chlorhexidine, bath, complete, dressed/undressed, linen changed Date: 2/2/2020  B: Bowel Function   no issues   I: Indwelling Catheters   Mc necessity: [REMOVED]      Urethral Catheter 01/15/20 2030 Straight-tip 16 Fr.-Reason for Continuing Urinary Catheterization: Critically ill in ICU requiring intensive monitoring   CVC necessity: Yes   IPAD offered: Yes  D: De-escalation Antibx   no  Plan for the day   Possibly go for a RHC and possibly get SWAN placed.  Family/Goals of care/Code Status   Code Status: Full Code     VS and assessment per flow sheet, patient progressing towards goals as tolerated, plan of care reviewed with Saba Lott and family, all concerns addressed, will continue to monitor.

## 2020-02-03 NOTE — NURSING
Pt made aware of possible RHC later this morning pt informed not to eat morning meal until further follow up from assigned team. Pt complied.

## 2020-02-03 NOTE — TELEPHONE ENCOUNTER
Pt's case rediscussed at Urgent Selection Meeting 2/3/20.  Pt was approved for LVAD as DT pending CTS relook

## 2020-02-03 NOTE — PROCEDURES
"Saba Lott is a 54 y.o. male patient.    Temp: 98.3 °F (36.8 °C) (02/03/20 1500)  Pulse: 109 (02/03/20 1530)  Resp: (!) 28 (02/03/20 1530)  BP: 105/76 (02/03/20 1530)  SpO2: 100 % (02/03/20 1530)  Weight: 90.7 kg (200 lb) (02/03/20 1413)  Height: 5' 7" (170.2 cm) (02/03/20 1413)    Central Line  Date/Time: 2/3/2020 4:11 PM  Performed by: Asad Westbrook MD  Assisting provider: Harsha Barton MD  Pre-operative Diagnosis: chf  Post-operative diagnosis: chf  Consent Done: Yes  Site marked: the operative site was marked  Time out: Immediately prior to procedure a "time out" was called to verify the correct patient, procedure, equipment, support staff and site/side marked as required.  Indications: diagnostic evaluation  Anesthesia: local infiltration    Anesthesia:  Local anesthesia used: yes  Local Anesthetic: lidocaine 2% without epinephrine  Preparation: skin prepped with ChloraPrep  Skin prep agent dried: skin prep agent completely dried prior to procedure  Sterile barriers: all five maximum sterile barriers used - cap, mask, sterile gown, sterile gloves, and large sterile sheet  Hand hygiene: hand hygiene performed prior to central venous catheter insertion  Location details: right internal jugular  Catheter size: 7.5 Fr  Ultrasound guidance: yes  Vessel Caliber: patent, compressibility normal  Needle advanced into vessel with real time Ultrasound guidance.  Guidewire confirmed in vessel.  Image recorded and saved.  Sterile sheath used.  Manometry: Yes  Number of attempts: 1  Assessment: placement verified by x-ray  Post-procedure: line sutured,  chlorhexidine patch,  sterile dressing applied and blood return through all ports  Complications: No          No flowsheet data found.    Asad Westbrook  2/3/2020  "

## 2020-02-03 NOTE — PROGRESS NOTES
[facility]  Heart Transplant  Progress Note    Patient Name: Saba Lott  MRN: [id number]  Admission Date: 1/15/2020  Hospital Length of Stay: 19 days  Attending Physician: Jeanette Tafoya MD  Primary Care Provider: Primary Doctor No  Principal Problem:Cardiogenic shock    Subjective:     Interval History: No acute events, Denies chest pain, palpitations or shortness of breath this morning. States that he got dyspneic last night but it improved after lasix increased. Lying flat in bed this am.     Continuous Infusions:   DOBUTamine 5 mcg/kg/min (02/03/20 1000)    epinephrine infustion (NON-TITRATING) 0.04 mcg/kg/min (02/03/20 1000)    furosemide (LASIX) 10 mg/mL infusion (non-titrating) 30 mg/hr (02/03/20 1000)    heparin (porcine) in D5W Stopped (02/03/20 0900)    nitric oxide gas       Scheduled Meds:   acetaminophen  1,000 mg Oral Q8H    atorvastatin  40 mg Oral Daily    cetirizine  10 mg Oral Daily    famotidine  40 mg Oral Daily    magnesium oxide  800 mg Oral BID    magnesium sulfate IVPB  1 g Intravenous Once    melatonin  6 mg Oral Nightly    polyethylene glycol  17 g Oral Daily    senna-docusate 8.6-50 mg  2 tablet Oral BID    triamcinolone acetonide 0.5%   Topical (Top) TID     PRN Meds:albuterol, bisacodyL, Dextrose 10% Bolus, Dextrose 10% Bolus, glucagon (human recombinant), glucose, glucose, heparin (PORCINE), heparin (PORCINE), hepatitis B, hydrOXYzine HCl, insulin aspart U-100, pneumococcal vaccine, sodium chloride 0.9%, traMADol    Review of patient's allergies indicates:   Allergen Reactions    Iodine and iodide containing products      Objective:     Vital Signs (Most Recent):  Temp: 97.4 °F (36.3 °C) (02/03/20 0700)  Pulse: 108 (02/03/20 1000)  Resp: (!) 22 (02/03/20 1000)  BP: 95/73 (02/03/20 1000)  SpO2: 100 % (02/03/20 1000) Vital Signs (24h Range):  Temp:  [97.4 °F (36.3 °C)-98.2 °F (36.8 °C)] 97.4 °F (36.3 °C)  Pulse:  [104-180] 108  Resp:  [21-39] 22  SpO2:   [92 %-100 %] 100 %  BP: ()/(60-95) 95/73     Patient Vitals for the past 72 hrs (Last 3 readings):   Weight   02/03/20 0300 91.1 kg (200 lb 13.4 oz)   02/02/20 0300 89.7 kg (197 lb 12 oz)     Body mass index is 31.46 kg/m².      Intake/Output Summary (Last 24 hours) at 2/3/2020 1113  Last data filed at 2/3/2020 1000  Gross per 24 hour   Intake 1806.61 ml   Output 2202 ml   Net -395.39 ml        Physical Exam   Constitutional: He is oriented to person, place, and time. He appears well-developed and well-nourished.   HENT:   Head: Normocephalic and atraumatic.   Eyes: Pupils are equal, round, and reactive to light. EOM are normal.   Neck: JVD present.   Cardiovascular: Normal rate and regular rhythm.   Murmur heard.  Pulmonary/Chest: Effort normal and breath sounds normal. No respiratory distress. He has no wheezes. He has no rales.   Abdominal: Soft. Bowel sounds are normal. He exhibits distension. There is no tenderness.   Musculoskeletal: Normal range of motion.   Neurological: He is oriented to person, place, and time. No cranial nerve deficit.   Skin: Skin is warm and dry. Capillary refill takes less than 2 seconds.   Psychiatric: He has a normal mood and affect. His behavior is normal.       Significant Labs:  CBC:  Recent Labs   Lab 02/01/20 0226 02/02/20  0342 02/03/20  0317   WBC 11.20 12.19 11.67   RBC 3.58* 3.84* 3.57*   HGB 9.7* 10.3* 9.5*   HCT 30.5* 31.9* 30.1*   * 409* 380*   MCV 85 83 84   MCH 27.1 26.8* 26.6*   MCHC 31.8* 32.3 31.6*     BNP:  Recent Labs   Lab 01/27/20  1420 02/02/20  1511   BNP 3,095* 3,069*     CMP:  Recent Labs   Lab 02/01/20  0226  02/02/20  0342 02/02/20  0539 02/02/20  1511 02/03/20  0317   *  --  154*  --  209* 298*   CALCIUM 8.7  --  9.4  --  9.3 8.5*   ALBUMIN 3.3*  --  3.6  --   --  3.1*   PROT 7.0  --  7.7  --   --  6.7   *  128*  128*   < > 132*  132* 135* 129* 126*   K 3.8  3.8  --  4.3  4.3  --  4.3 3.9  3.9   CO2 24  --  25  --  24 22*    CL 92*  --  95  --  92* 89*   BUN 39*  --  36*  --  39* 39*   CREATININE 1.6*  --  1.5*  --  1.8* 1.6*   ALKPHOS 90  --  93  --   --  89   ALT 19  --  20  --   --  22   AST 21  --  25  --   --  24   BILITOT 0.9  --  1.1*  --   --  1.3*    < > = values in this interval not displayed.      Coagulation:   Recent Labs   Lab 02/02/20  0539 02/03/20 0317 02/03/20 0540   APTT 52.5* >150.0* 43.3*     LDH:  No results for input(s): LDH in the last 72 hours.  Microbiology:  Microbiology Results (last 7 days)     Procedure Component Value Units Date/Time    Blood culture [[id number]] Collected:  02/03/20 0946    Order Status:  Sent Specimen:  Blood from Peripheral, Hand, Right Updated:  02/03/20 0954    Blood culture [[id number]] Collected:  02/03/20 0946    Order Status:  Sent Specimen:  Blood from Peripheral, Hand, Right Updated:  02/03/20 0953        I have reviewed all pertinent labs within the past 24 hours.    Estimated Creatinine Clearance: 56.2 mL/min (A) (based on SCr of 1.6 mg/dL (H)).    Diagnostic Results:  I have reviewed and interpreted all pertinent imaging results/findings within the past 24 hours.    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 Chronic MARC - Class II-III and mild LE edema.      Hospital Course (synopsis of major diagnoses, care, treatment, and services provided during the course of the hospital stay):  -2/12 admit to CDU for diuresis with IV lasix  -IV abx   -CXR with suspected small left pleural effusion with associated left basilar atelectasis versus evolving airspace disease  -2/13 single chamber ICD implant; IV lasix decreased to BID  -2/16 add dobutamine, hold BB due to hypotension, no ACE-I or ARB due to recent HPI hypotension  -2/17 Lasix changed to PO  -2/18 dobutamine discontinued    Admitted  to [facility] on Thursday due to severe SOB for a week with associated orthopnea, MARC, and PND unable to lie flat and found to be in acute diastolic heart failure. States he normally weighs around 219 but up to 254lb on admission tonight. Says he hasn't been the most compliant in terms of fluid restriction and daily weights but has avoided salt. BNP on admission was 2375. BUN/CRT 32/1.4 He was started on IV diuretics but continued to deteriorate. Creatinine continued to increase and reached 4.3 with oliguria. He was started on CRRT for a few days and tolerated well. Renal u/s was negative for obstruction and liver u/s without findings of cirrhosis. All of this was progression of cardiorenal syndrome with liver congestion from severe volume overload. On arrival vitals stable and in no acute distress. He has obvious anasarca up to the chest. He did have uop of 500 at time of arrival also.    TTE 5/28/19  · Moderate left ventricular enlargement.  · Severely decreased left ventricular systolic function. The estimated ejection fraction is 20%  · Global hypokinetic wall motion.  · Grade III (severe) left ventricular diastolic dysfunction consistent with restrictive physiology.  · Severe left atrial enlargement.  · Moderate right ventricular enlargement.  · Low normal right ventricular systolic function.  · Mild right atrial enlargement.  · Moderate mitral regurgitation.  Mild to moderate tricuspid regurgitation    * Cardiogenic shock  -NICM; Likely Etoh induced  -Transferred from [facility] with IABP at 1:1 for further level of care. IABP removed 1/25.  -Last 2D Echo 1/23/20: LVEF 20%, LVEDD 6.46 cm, RV mod dilated and mild to mod depressed, IVC 15. Will repeat today.  -RHC 1/27 on  5, Epi 0.04, Radha 20 ppm and Lasix 40 mg/hr: RA 12, PAP 38/22 (30), W 20 and CO/CI 4.11/2.06.   - Plan to place TLC today for CVP/ScVO2 monitoring.   - Continue Lasix 30mg/hr,  -GDMT: Hold Hyd/Isordil in anticipation VAD  -  He has completed the pathway and was presented at selection 1/29/20: It was the committee decision to decline the pt for transplant listing due to debility, poor renal function and lack of backup caregiver. Potential VAD placement scheduled on 2/6 with Dr Joshi. Will order pre VAD UA/cultures today.       ANJELICA (acute kidney injury)  -Creatinine was 3.0 on admit and got as high as 4.3 at OSH prior to transfer. Renal function was normal 8 months ago.  -Trending down today.   -Cont to monitor with diuresis     Acute hyperglycemia  -HgA1C 6.6  -Endocrine following    Moderate malnutrition  - Boost glucose control added 1/27   - Prealbumin is 23    Morbid obesity  -Weight down considerably since admit with diuresis.    Deep vein thrombosis (DVT) of right lower extremity  -Unsure of timing but was on eliquis PTA.   -Continue on heparin gtt for now      AICD (automatic cardioverter/defibrillator) present  -Medtronic single chamber ICD placed 2019 for primary prevention    Uninterrupted Critical Care/Counseling Time (not including procedures):  60mn  Mickey Rider NP  Heart Transplant  [facility]
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
## 2020-02-03 NOTE — PROGRESS NOTES
Saba Lott was discussed at selection committee on 02/03/20. Patient presented for OHT and VAD. Declined for OHT due to:  Debility, renal function, lack of backup caregiver     Patient has an anticipated survival benefit. Patient has NYHA IV symptoms which have failed to respond to optimal medical management.     Decision made to proceed with DT VAD implantation. This has been discussed with the patient and family, including the reasons for why he is not a transplant candidate at this time.     Patient has a continued need for IV inotropic therapy, and is on dobutamine at 5 mcg/kg/min. Patient has not had a CPX due to being on inotropes. additionally is on epinephrine and nitric oxide.     Patient is INTERMACS class 3, ACC stage D.     Discussed with the patient and family [facility]cul[facility] program outcomes as reported in INTERMACS (Interagency Registry for Mechanically Assisted Circulatory Support):    1 year survival = 92.7%  2 year survival = 86.7%  3 year survival = 86.7%    Patient and family acknowledged receipt of this information and all questions were answered.

## 2020-02-03 NOTE — HOSPITAL COURSE
Mr. Lott 54 yo BM, IC consulted for IABP placement. PMhx of nonischemic CMP with EF 20% with chronic heart failure s/p ICD implantation for primary prevention on 2/15/19 (Medtronic), tobacco and alcohol abuse (quit 2018), hx of DVT right leg, HTN. Chronic MARC - Class II-III and mild LE edema. Here for planned admission for LVAD with CTS on Thursday, in part of his optimization RIJ MML placed which demonstrated CVP >30. HTS team requesting IABP placement for medical optimization pre surgical.      TTE 5/28/19  · Moderate left ventricular enlargement.  · Severely decreased left ventricular systolic function. The estimated ejection fraction is 20%  · Global hypokinetic wall motion.  · Grade III (severe) left ventricular diastolic dysfunction consistent with restrictive physiology.  · Severe left atrial enlargement.  · Moderate right ventricular enlargement.  · Low normal right ventricular systolic function.  · Mild right atrial enlargement.  · Moderate mitral regurgitation.  Mild to moderate tricuspid regurgitation        AICD (automatic cardioverter/defibrillator) present  -Medtronic single chamber ICD placed 2019 for primary prevention

## 2020-02-03 NOTE — ASSESSMENT & PLAN NOTE
-NICM; Likely Etoh induced  -Transferred from [facility] with IABP at 1:1 for further level of care. IABP removed 1/25.  -Last 2D Echo 1/23/20: LVEF 20%, LVEDD 6.46 cm, RV mod dilated and mild to mod depressed, IVC 15. Will repeat today.  -RHC 1/27 on  5, Epi 0.04, Radha 20 ppm and Lasix 40 mg/hr: RA 12, PAP 38/22 (30), W 20 and CO/CI 4.11/2.06.   - Plan to place TLC today for CVP/ScVO2 monitoring.   - Continue Lasix 30mg/hr,  -GDMT: Hold Hyd/Isordil in anticipation VAD  - He has completed the pathway and was presented at selection 1/29/20: It was the committee decision to decline the pt for transplant listing due to debility, poor renal function and lack of backup caregiver. Potential VAD placement scheduled on 2/6 with Dr Joshi. Will order pre VAD UA/cultures today.

## 2020-02-03 NOTE — ASSESSMENT & PLAN NOTE
-Creatinine was 3.0 on admit and got as high as 4.3 at OSH prior to transfer. Renal function was normal 8 months ago.  -Trending down today.   -Cont to monitor with diuresis

## 2020-02-03 NOTE — ASSESSMENT & PLAN NOTE
Mr. Lott 52 yo BM, IC consulted for IABP placement. PMhx of nonischemic CMP with EF 20% with chronic heart failure s/p ICD implantation for primary prevention on 2/15/19 (Medtronic), tobacco and alcohol abuse (quit 2018), hx of DVT right leg, HTN. Chronic MARC - Class II-III and mild LE edema. Here for planned admission for LVAD with CTS on Thursday, in part of his optimization RIJ MML placed which demonstrated CVP >30. HTS team requesting IABP placement for medical optimization pre surgical.      TTE 5/28/19  · Moderate left ventricular enlargement.  · Severely decreased left ventricular systolic function. The estimated ejection fraction is 20%  · Global hypokinetic wall motion.  · Grade III (severe) left ventricular diastolic dysfunction consistent with restrictive physiology.  · Severe left atrial enlargement.  · Moderate right ventricular enlargement.  · Low normal right ventricular systolic function.  · Mild right atrial enlargement.  · Moderate mitral regurgitation.  Mild to moderate tricuspid regurgitation    -- RCFA IABP placement  - Access: RCFA  - Catheters: supercore   - Creatinine/CrCl:   - Allergies: Iodine  - Pre-Hydration: NA  - Pre-Op Med: NA  - All patient's questions were answered.  -The risks, benefits and alternatives of the procedure were explained to the patient.   -The risks of coronary angiography include but are not limited to: bleeding, infection, heart rhythm abnormalities, allergic reactions, kidney injury and potential need for dialysis, stroke and death.   - Should stenting be indicated, the patient has agreed to dual anti-platelet therapy for 1-consecutive year with a drug-eluting stent and a minimum of 1-month with the use of a bare metal stent  - Additionally, pt is aware that non-compliance is likely to result in stent clotting with heart attack, heart failure, and/or death  -The risks of moderate sedation include hypotension, respiratory depression, arrhythmias, bronchospasm, and  death.   - Informed consent was obtained and the  patient is agreeable to proceed with the procedure.

## 2020-02-03 NOTE — PROGRESS NOTES
Per emiliano benitez rdcs,  optison given ivp via right hand sl for imaging. Denies transfusion rxn. Tolerated well. Sl flushed before and after with 10 cc ns.

## 2020-02-04 ENCOUNTER — TELEPHONE (OUTPATIENT)
Dept: TRANSPLANT | Facility: CLINIC | Age: 55
End: 2020-02-04

## 2020-02-04 LAB
ABO + RH BLD: NORMAL
ALBUMIN SERPL BCP-MCNC: 3.5 G/DL (ref 3.5–5.2)
ALLENS TEST: ABNORMAL
ALLENS TEST: ABNORMAL
ALP SERPL-CCNC: 103 U/L (ref 55–135)
ALT SERPL W/O P-5'-P-CCNC: 25 U/L (ref 10–44)
ANION GAP SERPL CALC-SCNC: 12 MMOL/L (ref 8–16)
ANION GAP SERPL CALC-SCNC: 16 MMOL/L (ref 8–16)
ANION GAP SERPL CALC-SCNC: 9 MMOL/L (ref 8–16)
APTT BLDCRRT: 42.4 SEC (ref 21–32)
AST SERPL-CCNC: 28 U/L (ref 10–40)
BASOPHILS # BLD AUTO: 0.04 K/UL (ref 0–0.2)
BASOPHILS NFR BLD: 0.4 % (ref 0–1.9)
BILIRUB SERPL-MCNC: 1.4 MG/DL (ref 0.1–1)
BLD GP AB SCN CELLS X3 SERPL QL: NORMAL
BUN SERPL-MCNC: 35 MG/DL (ref 6–20)
BUN SERPL-MCNC: 37 MG/DL (ref 6–20)
BUN SERPL-MCNC: 38 MG/DL (ref 6–20)
CALCIUM SERPL-MCNC: 10 MG/DL (ref 8.7–10.5)
CALCIUM SERPL-MCNC: 9.5 MG/DL (ref 8.7–10.5)
CALCIUM SERPL-MCNC: 9.5 MG/DL (ref 8.7–10.5)
CHLORIDE SERPL-SCNC: 95 MMOL/L (ref 95–110)
CHLORIDE SERPL-SCNC: 96 MMOL/L (ref 95–110)
CHLORIDE SERPL-SCNC: 96 MMOL/L (ref 95–110)
CO2 SERPL-SCNC: 23 MMOL/L (ref 23–29)
CO2 SERPL-SCNC: 27 MMOL/L (ref 23–29)
CO2 SERPL-SCNC: 30 MMOL/L (ref 23–29)
CREAT SERPL-MCNC: 1.6 MG/DL (ref 0.5–1.4)
CREAT SERPL-MCNC: 1.7 MG/DL (ref 0.5–1.4)
CREAT SERPL-MCNC: 1.7 MG/DL (ref 0.5–1.4)
DELSYS: ABNORMAL
DIFFERENTIAL METHOD: ABNORMAL
EOSINOPHIL # BLD AUTO: 0.2 K/UL (ref 0–0.5)
EOSINOPHIL NFR BLD: 1.4 % (ref 0–8)
ERYTHROCYTE [DISTWIDTH] IN BLOOD BY AUTOMATED COUNT: 18.4 % (ref 11.5–14.5)
EST. GFR  (AFRICAN AMERICAN): 51.3 ML/MIN/1.73 M^2
EST. GFR  (AFRICAN AMERICAN): 51.3 ML/MIN/1.73 M^2
EST. GFR  (AFRICAN AMERICAN): 55.2 ML/MIN/1.73 M^2
EST. GFR  (NON AFRICAN AMERICAN): 44.4 ML/MIN/1.73 M^2
EST. GFR  (NON AFRICAN AMERICAN): 44.4 ML/MIN/1.73 M^2
EST. GFR  (NON AFRICAN AMERICAN): 47.8 ML/MIN/1.73 M^2
FIO2: 36
FLOW: 4
GLUCOSE SERPL-MCNC: 133 MG/DL (ref 70–110)
GLUCOSE SERPL-MCNC: 158 MG/DL (ref 70–110)
GLUCOSE SERPL-MCNC: 167 MG/DL (ref 70–110)
HCO3 UR-SCNC: 28.3 MMOL/L (ref 24–28)
HCO3 UR-SCNC: 30.4 MMOL/L (ref 24–28)
HCT VFR BLD AUTO: 32.2 % (ref 40–54)
HGB BLD-MCNC: 10.2 G/DL (ref 14–18)
IMM GRANULOCYTES # BLD AUTO: 0.13 K/UL (ref 0–0.04)
IMM GRANULOCYTES NFR BLD AUTO: 1.2 % (ref 0–0.5)
LYMPHOCYTES # BLD AUTO: 1.8 K/UL (ref 1–4.8)
LYMPHOCYTES NFR BLD: 15.9 % (ref 18–48)
MAGNESIUM SERPL-MCNC: 2.2 MG/DL (ref 1.6–2.6)
MAGNESIUM SERPL-MCNC: 2.3 MG/DL (ref 1.6–2.6)
MAGNESIUM SERPL-MCNC: 2.4 MG/DL (ref 1.6–2.6)
MCH RBC QN AUTO: 26.6 PG (ref 27–31)
MCHC RBC AUTO-ENTMCNC: 31.7 G/DL (ref 32–36)
MCV RBC AUTO: 84 FL (ref 82–98)
METHEMOGLOBIN: 0.3 % (ref 0–3)
MODE: ABNORMAL
MONOCYTES # BLD AUTO: 0.8 K/UL (ref 0.3–1)
MONOCYTES NFR BLD: 7 % (ref 4–15)
NEUTROPHILS # BLD AUTO: 8.3 K/UL (ref 1.8–7.7)
NEUTROPHILS NFR BLD: 74.1 % (ref 38–73)
NRBC BLD-RTO: 1 /100 WBC
PCO2 BLDA: 43.9 MMHG (ref 35–45)
PCO2 BLDA: 44.9 MMHG (ref 35–45)
PH SMN: 7.42 [PH] (ref 7.35–7.45)
PH SMN: 7.44 [PH] (ref 7.35–7.45)
PLATELET # BLD AUTO: 373 K/UL (ref 150–350)
PMV BLD AUTO: 10.3 FL (ref 9.2–12.9)
PO2 BLDA: 31 MMHG (ref 40–60)
PO2 BLDA: 33 MMHG (ref 40–60)
POC BE: 4 MMOL/L
POC BE: 6 MMOL/L
POC SATURATED O2: 60 % (ref 95–100)
POC SATURATED O2: 64 % (ref 95–100)
POC TCO2: 30 MMOL/L (ref 24–29)
POC TCO2: 32 MMOL/L (ref 24–29)
POCT GLUCOSE: 114 MG/DL (ref 70–110)
POCT GLUCOSE: 124 MG/DL (ref 70–110)
POCT GLUCOSE: 127 MG/DL (ref 70–110)
POCT GLUCOSE: 141 MG/DL (ref 70–110)
POTASSIUM SERPL-SCNC: 3.8 MMOL/L (ref 3.5–5.1)
POTASSIUM SERPL-SCNC: 3.8 MMOL/L (ref 3.5–5.1)
POTASSIUM SERPL-SCNC: 4.1 MMOL/L (ref 3.5–5.1)
POTASSIUM SERPL-SCNC: 4.4 MMOL/L (ref 3.5–5.1)
PROT SERPL-MCNC: 7.5 G/DL (ref 6–8.4)
RBC # BLD AUTO: 3.83 M/UL (ref 4.6–6.2)
SAMPLE: ABNORMAL
SAMPLE: ABNORMAL
SITE: ABNORMAL
SITE: ABNORMAL
SODIUM SERPL-SCNC: 134 MMOL/L (ref 136–145)
SODIUM SERPL-SCNC: 135 MMOL/L (ref 136–145)
SODIUM SERPL-SCNC: 135 MMOL/L (ref 136–145)
WBC # BLD AUTO: 11.15 K/UL (ref 3.9–12.7)

## 2020-02-04 PROCEDURE — 86850 RBC ANTIBODY SCREEN: CPT

## 2020-02-04 PROCEDURE — 25000003 PHARM REV CODE 250: Performed by: STUDENT IN AN ORGANIZED HEALTH CARE EDUCATION/TRAINING PROGRAM

## 2020-02-04 PROCEDURE — 85025 COMPLETE CBC W/AUTO DIFF WBC: CPT

## 2020-02-04 PROCEDURE — 25000003 PHARM REV CODE 250: Performed by: NURSE PRACTITIONER

## 2020-02-04 PROCEDURE — 99291 CRITICAL CARE FIRST HOUR: CPT | Mod: ,,, | Performed by: NURSE PRACTITIONER

## 2020-02-04 PROCEDURE — 83735 ASSAY OF MAGNESIUM: CPT | Mod: 91

## 2020-02-04 PROCEDURE — 63600175 PHARM REV CODE 636 W HCPCS: Performed by: HOSPITALIST

## 2020-02-04 PROCEDURE — 99292 CRITICAL CARE ADDL 30 MIN: CPT | Mod: ,,, | Performed by: INTERNAL MEDICINE

## 2020-02-04 PROCEDURE — 97110 THERAPEUTIC EXERCISES: CPT

## 2020-02-04 PROCEDURE — 99900035 HC TECH TIME PER 15 MIN (STAT)

## 2020-02-04 PROCEDURE — 25000003 PHARM REV CODE 250: Performed by: INTERNAL MEDICINE

## 2020-02-04 PROCEDURE — 86920 COMPATIBILITY TEST SPIN: CPT

## 2020-02-04 PROCEDURE — 27000202 HC IABP, ADD'L DAY

## 2020-02-04 PROCEDURE — 83735 ASSAY OF MAGNESIUM: CPT

## 2020-02-04 PROCEDURE — 82803 BLOOD GASES ANY COMBINATION: CPT

## 2020-02-04 PROCEDURE — 63600175 PHARM REV CODE 636 W HCPCS: Performed by: STUDENT IN AN ORGANIZED HEALTH CARE EDUCATION/TRAINING PROGRAM

## 2020-02-04 PROCEDURE — 99292 PR CRITICAL CARE, ADDL 30 MIN: ICD-10-PCS | Mod: ,,, | Performed by: INTERNAL MEDICINE

## 2020-02-04 PROCEDURE — 27000221 HC OXYGEN, UP TO 24 HOURS

## 2020-02-04 PROCEDURE — 80048 BASIC METABOLIC PNL TOTAL CA: CPT

## 2020-02-04 PROCEDURE — 99291 PR CRITICAL CARE, E/M 30-74 MINUTES: ICD-10-PCS | Mod: ,,, | Performed by: NURSE PRACTITIONER

## 2020-02-04 PROCEDURE — 94761 N-INVAS EAR/PLS OXIMETRY MLT: CPT

## 2020-02-04 PROCEDURE — 63600367 HC NITRIC OXIDE PER HOUR

## 2020-02-04 PROCEDURE — 80053 COMPREHEN METABOLIC PANEL: CPT

## 2020-02-04 PROCEDURE — 20000000 HC ICU ROOM

## 2020-02-04 PROCEDURE — 80048 BASIC METABOLIC PNL TOTAL CA: CPT | Mod: 91

## 2020-02-04 PROCEDURE — 63600175 PHARM REV CODE 636 W HCPCS: Performed by: NURSE PRACTITIONER

## 2020-02-04 PROCEDURE — 36415 COLL VENOUS BLD VENIPUNCTURE: CPT

## 2020-02-04 PROCEDURE — 85730 THROMBOPLASTIN TIME PARTIAL: CPT

## 2020-02-04 RX ORDER — POTASSIUM CHLORIDE 20 MEQ/15ML
40 SOLUTION ORAL ONCE
Status: COMPLETED | OUTPATIENT
Start: 2020-02-04 | End: 2020-02-04

## 2020-02-04 RX ORDER — OXYCODONE AND ACETAMINOPHEN 5; 325 MG/1; MG/1
1 TABLET ORAL ONCE
Status: COMPLETED | OUTPATIENT
Start: 2020-02-04 | End: 2020-02-04

## 2020-02-04 RX ORDER — ACETAMINOPHEN 325 MG/1
650 TABLET ORAL EVERY 6 HOURS PRN
Status: DISCONTINUED | OUTPATIENT
Start: 2020-02-04 | End: 2020-02-06

## 2020-02-04 RX ORDER — POTASSIUM CHLORIDE 29.8 MG/ML
40 INJECTION INTRAVENOUS ONCE
Status: DISCONTINUED | OUTPATIENT
Start: 2020-02-04 | End: 2020-02-04

## 2020-02-04 RX ORDER — POTASSIUM CHLORIDE 29.8 MG/ML
20 INJECTION INTRAVENOUS ONCE
Status: COMPLETED | OUTPATIENT
Start: 2020-02-04 | End: 2020-02-04

## 2020-02-04 RX ORDER — LANOLIN ALCOHOL/MO/W.PET/CERES
400 CREAM (GRAM) TOPICAL ONCE
Status: COMPLETED | OUTPATIENT
Start: 2020-02-04 | End: 2020-02-04

## 2020-02-04 RX ADMIN — Medication 800 MG: at 09:02

## 2020-02-04 RX ADMIN — STANDARDIZED SENNA CONCENTRATE AND DOCUSATE SODIUM 2 TABLET: 8.6; 5 TABLET ORAL at 09:02

## 2020-02-04 RX ADMIN — CHLOROTHIAZIDE SODIUM 250 MG: 500 INJECTION, POWDER, LYOPHILIZED, FOR SOLUTION INTRAVENOUS at 09:02

## 2020-02-04 RX ADMIN — FUROSEMIDE 40 MG/HR: 10 INJECTION, SOLUTION INTRAMUSCULAR; INTRAVENOUS at 09:02

## 2020-02-04 RX ADMIN — Medication 6 MG: at 08:02

## 2020-02-04 RX ADMIN — STANDARDIZED SENNA CONCENTRATE AND DOCUSATE SODIUM 2 TABLET: 8.6; 5 TABLET ORAL at 08:02

## 2020-02-04 RX ADMIN — TRIAMCINOLONE ACETONIDE: 5 CREAM TOPICAL at 08:02

## 2020-02-04 RX ADMIN — CETIRIZINE HYDROCHLORIDE 10 MG: 10 TABLET, FILM COATED ORAL at 09:02

## 2020-02-04 RX ADMIN — CHLOROTHIAZIDE SODIUM 500 MG: 500 INJECTION, POWDER, LYOPHILIZED, FOR SOLUTION INTRAVENOUS at 07:02

## 2020-02-04 RX ADMIN — DOBUTAMINE IN DEXTROSE 5 MCG/KG/MIN: 200 INJECTION, SOLUTION INTRAVENOUS at 04:02

## 2020-02-04 RX ADMIN — EPINEPHRINE 0.04 MCG/KG/MIN: 1 INJECTION PARENTERAL at 04:02

## 2020-02-04 RX ADMIN — FAMOTIDINE 40 MG: 40 POWDER, FOR SUSPENSION ORAL at 09:02

## 2020-02-04 RX ADMIN — TRIAMCINOLONE ACETONIDE: 5 CREAM TOPICAL at 09:02

## 2020-02-04 RX ADMIN — DOBUTAMINE IN DEXTROSE 5 MCG/KG/MIN: 200 INJECTION, SOLUTION INTRAVENOUS at 06:02

## 2020-02-04 RX ADMIN — HEPARIN SODIUM AND DEXTROSE 17 UNITS/KG/HR: 10000; 5 INJECTION INTRAVENOUS at 04:02

## 2020-02-04 RX ADMIN — FUROSEMIDE 40 MG/HR: 10 INJECTION, SOLUTION INTRAMUSCULAR; INTRAVENOUS at 04:02

## 2020-02-04 RX ADMIN — Medication 800 MG: at 08:02

## 2020-02-04 RX ADMIN — ACETAMINOPHEN 1000 MG: 500 TABLET ORAL at 05:02

## 2020-02-04 RX ADMIN — TRIAMCINOLONE ACETONIDE: 5 CREAM TOPICAL at 03:02

## 2020-02-04 RX ADMIN — CHLOROTHIAZIDE SODIUM 250 MG: 500 INJECTION, POWDER, LYOPHILIZED, FOR SOLUTION INTRAVENOUS at 05:02

## 2020-02-04 RX ADMIN — EPINEPHRINE 0.04 MCG/KG/MIN: 1 INJECTION PARENTERAL at 08:02

## 2020-02-04 RX ADMIN — ATORVASTATIN CALCIUM 40 MG: 20 TABLET, FILM COATED ORAL at 09:02

## 2020-02-04 RX ADMIN — Medication 400 MG: at 03:02

## 2020-02-04 RX ADMIN — POTASSIUM CHLORIDE 20 MEQ: 29.8 INJECTION, SOLUTION INTRAVENOUS at 03:02

## 2020-02-04 RX ADMIN — POTASSIUM CHLORIDE 40 MEQ: 20 SOLUTION ORAL at 05:02

## 2020-02-04 RX ADMIN — OXYCODONE HYDROCHLORIDE AND ACETAMINOPHEN 1 TABLET: 5; 325 TABLET ORAL at 08:02

## 2020-02-04 NOTE — NURSING
Pt lying in bed, MD Asad at bedside to place cental line. I obtained VAD emergency numbers. Cannot find home inspection checklist, will bring a new checklist to pt tomorrow to fill out. Pt aware of VAD placement on Thursday, 2/6.

## 2020-02-04 NOTE — SUBJECTIVE & OBJECTIVE
Interval History: TLC placed yesterday afternoon. Elevated CVP/increased dyspnea--->IABP replaced. This am, pt states that he is feeling much better.      Continuous Infusions:   DOBUTamine 5 mcg/kg/min (02/04/20 1000)    epinephrine infustion (NON-TITRATING) 0.04 mcg/kg/min (02/04/20 1000)    furosemide (LASIX) 10 mg/mL infusion (non-titrating) 40 mg/hr (02/04/20 1000)    heparin (porcine) in D5W 17 Units/kg/hr (02/04/20 1000)    nitric oxide gas       Scheduled Meds:   acetaminophen  1,000 mg Oral Q8H    atorvastatin  40 mg Oral Daily    cetirizine  10 mg Oral Daily    chlorothiazide (DIURIL) IVPB  250 mg Intravenous Once    famotidine  40 mg Oral Daily    magnesium oxide  800 mg Oral BID    magnesium sulfate IVPB  1 g Intravenous Once    melatonin  6 mg Oral Nightly    polyethylene glycol  17 g Oral Daily    senna-docusate 8.6-50 mg  2 tablet Oral BID    triamcinolone acetonide 0.5%   Topical (Top) TID     PRN Meds:albuterol, bisacodyL, Dextrose 10% Bolus, Dextrose 10% Bolus, glucagon (human recombinant), glucose, glucose, heparin (PORCINE), heparin (PORCINE), hepatitis B, hydrOXYzine HCl, insulin aspart U-100, pneumococcal vaccine, sodium chloride 0.9%, traMADol    Review of patient's allergies indicates:   Allergen Reactions    Iodine and iodide containing products      Objective:     Vital Signs (Most Recent):  Temp: 97.1 °F (36.2 °C) (02/04/20 0700)  Pulse: 101 (02/04/20 1000)  Resp: 18 (02/04/20 1000)  BP: 127/73 (02/04/20 1000)  SpO2: 95 % (02/04/20 1000) Vital Signs (24h Range):  Temp:  [97.1 °F (36.2 °C)-98.3 °F (36.8 °C)] 97.1 °F (36.2 °C)  Pulse:  [] 101  Resp:  [16-38] 18  SpO2:  [94 %-100 %] 95 %  BP: ()/(56-93) 127/73     Patient Vitals for the past 72 hrs (Last 3 readings):   Weight   02/04/20 0500 89.7 kg (197 lb 12 oz)   02/03/20 1413 90.7 kg (200 lb)   02/03/20 0300 91.1 kg (200 lb 13.4 oz)     Body mass index is 30.97 kg/m².      Intake/Output Summary (Last 24 hours)  at 2/4/2020 1015  Last data filed at 2/4/2020 1000  Gross per 24 hour   Intake 1998.21 ml   Output 3750 ml   Net -1751.79 ml     CVP:  [24 mmHg] 24 mmHg  Physical Exam   Constitutional: He is oriented to person, place, and time. He appears well-developed and well-nourished.   HENT:   Head: Normocephalic and atraumatic.   Eyes: Pupils are equal, round, and reactive to light. EOM are normal.   Neck: JVD present.   RIJ TLC place 2/3/20.   Cardiovascular: Normal rate and regular rhythm.   Murmur heard.  IABP 1:1 placed 2/3/20.   Pulmonary/Chest: Effort normal and breath sounds normal. No respiratory distress. He has no wheezes. He has no rales.   Abdominal: Soft. Bowel sounds are normal. He exhibits distension. There is no tenderness.   Musculoskeletal: Normal range of motion.   Neurological: He is oriented to person, place, and time. No cranial nerve deficit.   Skin: Skin is warm and dry. Capillary refill takes less than 2 seconds.   Psychiatric: He has a normal mood and affect. His behavior is normal.     Significant Labs:  CBC:  Recent Labs   Lab 02/02/20  0342 02/03/20  0317 02/04/20  0300   WBC 12.19 11.67 11.15   RBC 3.84* 3.57* 3.83*   HGB 10.3* 9.5* 10.2*   HCT 31.9* 30.1* 32.2*   * 380* 373*   MCV 83 84 84   MCH 26.8* 26.6* 26.6*   MCHC 32.3 31.6* 31.7*     BNP:  Recent Labs   Lab 02/02/20  1511   BNP 3,069*     CMP:  Recent Labs   Lab 02/02/20  0342  02/02/20  1511 02/03/20  0317 02/03/20  1738 02/04/20  0300   *  --  209* 298*  --  133*   CALCIUM 9.4  --  9.3 8.5*  --  9.5   ALBUMIN 3.6  --   --  3.1*  --  3.5   PROT 7.7  --   --  6.7  --  7.5   *  132*   < > 129* 126*  --  135*   K 4.3  4.3  --  4.3 3.9  3.9 4.4 3.8  3.8   CO2 25  --  24 22*  --  23   CL 95  --  92* 89*  --  96   BUN 36*  --  39* 39*  --  38*   CREATININE 1.5*  --  1.8* 1.6*  --  1.6*   ALKPHOS 93  --   --  89  --  103   ALT 20  --   --  22  --  25   AST 25  --   --  24  --  28   BILITOT 1.1*  --   --  1.3*  --  1.4*     < > = values in this interval not displayed.      Coagulation:   Recent Labs   Lab 02/03/20  0317 02/03/20  0540 02/04/20  0300   APTT >150.0* 43.3* 42.4*     LDH:  No results for input(s): LDH in the last 72 hours.  Microbiology:  Microbiology Results (last 7 days)     Procedure Component Value Units Date/Time    Blood culture [[id number]] Collected:  02/03/20 0946    Order Status:  Completed Specimen:  Blood from Peripheral, Hand, Right Updated:  02/03/20 1715     Blood Culture, Routine No Growth to date    Blood culture [[id number]] Collected:  02/03/20 0946    Order Status:  Completed Specimen:  Blood from Peripheral, Hand, Right Updated:  02/03/20 1715     Blood Culture, Routine No Growth to date        I have reviewed all pertinent labs within the past 24 hours.    Estimated Creatinine Clearance: 55.7 mL/min (A) (based on SCr of 1.6 mg/dL (H)).    Diagnostic Results:  I have reviewed and interpreted all pertinent imaging results/findings within the past 24 hours.

## 2020-02-04 NOTE — PROGRESS NOTES
"UPDATE    SW to pt's room for update today.  Pt presents as lying in bed with IABP in place, aao x4, calm, cooperative, and asking and answering questions appropriately.  Pt reports he is feeling "better" today.  Pt verbalizes understanding of plan for LVAD implant on Thurs 2/6 and states he feels ready for surgery.  Pt confirms that his mother, Anna Lott, will be his primary caregiver post-LVAD.  SW discussed [facility] room for family on night of surgery.  Pt states his mother & sister will be here and can utilize room.  Pt requests that SW call mother to discuss room arrangements.  Pt reports coping adequately at this time and denies any questions or concerns to SW.    SW spoke with mother by phone ([phone]) this afternoon and reviewed  arrangements.  SW emailed auth request to  to reserve room under mother's name for night of 2/6.  Mother denies any questions for SW at this time.  SW providing ongoing psychosocial and counseling support, education, resources, assistance, and discharge planning as indicated.  SW following and remains available.  "

## 2020-02-04 NOTE — PLAN OF CARE
[facility] DAILY GOALS       A: Awake    RASS: Goal - RASS Goal: 0-->alert and calm  Actual - RASS ([place] Agitation-Sedation Scale): 0-->alert and calm   Restraint necessity: Clinical Justification: Removing medical devices  B: Breath   SBT: Not intubated   C: Coordinate A & B, analgesics/sedatives   Pain: managed    SAT: Not intubated  D: Delirium   CAM-ICU: Overall CAM-ICU: Negative  E: Early Mobility   MOVE Screen: Pass   Activity: Activity Management: activity adjusted per tolerance  FAS: Feeding/Nutrition   Diet order: Diet/Nutrition Received: regular, restrict fluids,   Fluid restriction: Fluid Requirement: 1000 cc FR  T: Thrombus   DVT prophylaxis: VTE Required Core Measure: Pharmacological prophylaxis initiated/maintained  H: HOB Elevation   Head of Bed (HOB): HOB flat  U: Ulcer Prophylaxis   GI: yes  G: Glucose control   managed    S: Skin   Bundle compliance: yes   Bathing/Skin Care: bath, chlorhexidine, bath, complete, incontinence care, linen changed Date: [unfilled]  B: Bowel Function   no issues   I: Indwelling Catheters   Mc necessity: [REMOVED]      Urethral Catheter 01/15/20 2030 Straight-tip 16 Fr.-Reason for Continuing Urinary Catheterization: Critically ill in ICU requiring intensive monitoring   CVC necessity: Yes   IPAD offered: No  D: De-escalation Antibx   Yes  Plan for the day   Continue to diurese. Patient does not seem to understand fluid restrictions and need for nasal cannula due to nitric despite multiple education attempts. CVP down to 19 this shift.   Family/Goals of care/Code Status   Code Status: Full Code     No acute events throughout day, VS and assessment per flow sheet, patient progressing towards goals as tolerated, plan of care reviewed with Saba Lott and family, all concerns addressed, will continue to monitor.

## 2020-02-04 NOTE — PROGRESS NOTES
Pharmacokinetic Assessment Follow Up: IV Vancomycin     Antimicrobial therapy discontinued.      Thank you,  Izabel Torres, Pharm.D.

## 2020-02-04 NOTE — PROGRESS NOTES
Pharmacokinetic Initial Assessment: IV Vancomycin    Assessment/Plan:    Patient received a dose of vanc 1000mg at 1800, will give an additional 1250 to equal a 2250 load. Follow up with a maintenance dose of 2000mg q24h  Desired empiric serum trough concentration is 15 to 20 mcg/mL  Draw vancomycin trough level 30 min prior to third dose on 2/5 at approximately 2030  Pharmacy will continue to follow and monitor vancomycin.      Please contact pharmacy at extension [phone] with any questions regarding this assessment.     Thank you for the consult,   Mary Childers       Patient brief summary:  Saba Lott is a 54 y.o. male initiated on antimicrobial therapy with IV Vancomycin for treatment of suspected bacteremia    Drug Allergies:   Review of patient's allergies indicates:   Allergen Reactions    Iodine and iodide containing products        Actual Body Weight:   90.7kg    Renal Function:   Estimated Creatinine Clearance: 56 mL/min (A) (based on SCr of 1.6 mg/dL (H)).,     Dialysis Method (if applicable):  N/A    CBC (last 72 hours):  Recent Labs   Lab Result Units 02/01/20  0226 02/02/20  0342 02/03/20  0317   WBC K/uL 11.20 12.19 11.67   Hemoglobin g/dL 9.7* 10.3* 9.5*   Hematocrit % 30.5* 31.9* 30.1*   Platelets K/uL 391* 409* 380*   Gran% % 75.9* 79.2* 76.1*   Lymph% % 14.9* 13.2* 14.7*   Mono% % 6.2 5.5 6.0   Eosinophil% % 1.5 0.9 1.3   Basophil% % 0.5 0.3 0.5   Differential Method  Automated Automated Automated       Metabolic Panel (last 72 hours):  Recent Labs   Lab Result Units 02/01/20  0226 02/01/20  1143 02/01/20  1731 02/02/20  0041 02/02/20  0342 02/02/20  0539 02/02/20  1511 02/03/20  0317 02/03/20  1028 02/03/20  1738   Sodium mmol/L 128*  128*  128* 129* 126* 131* 132*  132* 135* 129* 126*  --   --    Potassium mmol/L 3.8  3.8  --   --   --  4.3  4.3  --  4.3 3.9  3.9  --  4.4   Chloride mmol/L 92*  --   --   --  95  --  92* 89*  --   --    CO2 mmol/L 24  --   --   --  25  --  24 22*  --    --    Glucose mg/dL 336*  --   --   --  154*  --  209* 298*  --   --    Glucose, UA   --   --   --   --   --   --   --   --  Negative  --    BUN, Bld mg/dL 39*  --   --   --  36*  --  39* 39*  --   --    Creatinine mg/dL 1.6*  --   --   --  1.5*  --  1.8* 1.6*  --   --    Albumin g/dL 3.3*  --   --   --  3.6  --   --  3.1*  --   --    Total Bilirubin mg/dL 0.9  --   --   --  1.1*  --   --  1.3*  --   --    Alkaline Phosphatase U/L 90  --   --   --  93  --   --  89  --   --    AST U/L 21  --   --   --  25  --   --  24  --   --    ALT U/L 19  --   --   --  20  --   --  22  --   --    Magnesium mg/dL 2.2  --   --   --  2.3  --   --  2.2  --  2.3       Drug levels (last 3 results):  No results for input(s): VANCOMYCINRA, VANCOMYCINPE, VANCOMYCINTR in the last 72 hours.    Microbiologic Results:  Microbiology Results (last 7 days)     Procedure Component Value Units Date/Time    Blood culture [[id number]] Collected:  02/03/20 0946    Order Status:  Completed Specimen:  Blood from Peripheral, Hand, Right Updated:  02/03/20 1715     Blood Culture, Routine No Growth to date    Blood culture [[id number]] Collected:  02/03/20 0946    Order Status:  Completed Specimen:  Blood from Peripheral, Hand, Right Updated:  02/03/20 1715     Blood Culture, Routine No Growth to date

## 2020-02-04 NOTE — CARE UPDATE
Brief HTS Update Note    S/p IABP, marker at level of logan. Since with considerable improvement in CVP (39->15), 1.9 L UOP since placed (3.7L for day, NN 1.6L), MVO2 46->64. Discussed w staff, holding on additional diuretics for now.    Tommy Cochran MD  PGY-4, Cardiology  Pager [phone]

## 2020-02-04 NOTE — NURSING
Pt resting in bed with IABP in place. I helped pt complete his VAD home inspection checklist. Financial clearance has been obtained for VAD on 2/6/2020.

## 2020-02-04 NOTE — PROGRESS NOTES
[facility]  Heart Transplant  Progress Note    Patient Name: Saba Lott  MRN: [id number]  Admission Date: 1/15/2020  Hospital Length of Stay: 20 days  Attending Physician: Jeanette Tafoya MD  Primary Care Provider: Primary Doctor No  Principal Problem:Cardiogenic shock    Subjective:     Interval History: TLC placed yesterday afternoon. Elevated CVP/increased dyspnea--->IABP replaced. This am, pt states that he is feeling much better.      Continuous Infusions:   DOBUTamine 5 mcg/kg/min (02/04/20 1000)    epinephrine infustion (NON-TITRATING) 0.04 mcg/kg/min (02/04/20 1000)    furosemide (LASIX) 10 mg/mL infusion (non-titrating) 40 mg/hr (02/04/20 1000)    heparin (porcine) in D5W 17 Units/kg/hr (02/04/20 1000)    nitric oxide gas       Scheduled Meds:   acetaminophen  1,000 mg Oral Q8H    atorvastatin  40 mg Oral Daily    cetirizine  10 mg Oral Daily    chlorothiazide (DIURIL) IVPB  250 mg Intravenous Once    famotidine  40 mg Oral Daily    magnesium oxide  800 mg Oral BID    magnesium sulfate IVPB  1 g Intravenous Once    melatonin  6 mg Oral Nightly    polyethylene glycol  17 g Oral Daily    senna-docusate 8.6-50 mg  2 tablet Oral BID    triamcinolone acetonide 0.5%   Topical (Top) TID     PRN Meds:albuterol, bisacodyL, Dextrose 10% Bolus, Dextrose 10% Bolus, glucagon (human recombinant), glucose, glucose, heparin (PORCINE), heparin (PORCINE), hepatitis B, hydrOXYzine HCl, insulin aspart U-100, pneumococcal vaccine, sodium chloride 0.9%, traMADol    Review of patient's allergies indicates:   Allergen Reactions    Iodine and iodide containing products      Objective:     Vital Signs (Most Recent):  Temp: 97.1 °F (36.2 °C) (02/04/20 0700)  Pulse: 101 (02/04/20 1000)  Resp: 18 (02/04/20 1000)  BP: 127/73 (02/04/20 1000)  SpO2: 95 % (02/04/20 1000) Vital Signs (24h Range):  Temp:  [97.1 °F (36.2 °C)-98.3 °F (36.8 °C)] 97.1 °F (36.2 °C)  Pulse:  [] 101  Resp:  [16-38] 18  SpO2:   [94 %-100 %] 95 %  BP: ()/(56-93) 127/73     Patient Vitals for the past 72 hrs (Last 3 readings):   Weight   02/04/20 0500 89.7 kg (197 lb 12 oz)   02/03/20 1413 90.7 kg (200 lb)   02/03/20 0300 91.1 kg (200 lb 13.4 oz)     Body mass index is 30.97 kg/m².      Intake/Output Summary (Last 24 hours) at 2/4/2020 1015  Last data filed at 2/4/2020 1000  Gross per 24 hour   Intake 1998.21 ml   Output 3750 ml   Net -1751.79 ml     CVP:  [24 mmHg] 24 mmHg  Physical Exam   Constitutional: He is oriented to person, place, and time. He appears well-developed and well-nourished.   HENT:   Head: Normocephalic and atraumatic.   Eyes: Pupils are equal, round, and reactive to light. EOM are normal.   Neck: JVD present.   RIJ TLC place 2/3/20.   Cardiovascular: Normal rate and regular rhythm.   Murmur heard.  IABP 1:1 placed 2/3/20.   Pulmonary/Chest: Effort normal and breath sounds normal. No respiratory distress. He has no wheezes. He has no rales.   Abdominal: Soft. Bowel sounds are normal. He exhibits distension. There is no tenderness.   Musculoskeletal: Normal range of motion.   Neurological: He is oriented to person, place, and time. No cranial nerve deficit.   Skin: Skin is warm and dry. Capillary refill takes less than 2 seconds.   Psychiatric: He has a normal mood and affect. His behavior is normal.     Significant Labs:  CBC:  Recent Labs   Lab 02/02/20  0342 02/03/20  0317 02/04/20  0300   WBC 12.19 11.67 11.15   RBC 3.84* 3.57* 3.83*   HGB 10.3* 9.5* 10.2*   HCT 31.9* 30.1* 32.2*   * 380* 373*   MCV 83 84 84   MCH 26.8* 26.6* 26.6*   MCHC 32.3 31.6* 31.7*     BNP:  Recent Labs   Lab 02/02/20  1511   BNP 3,069*     CMP:  Recent Labs   Lab 02/02/20  0342  02/02/20  1511 02/03/20  0317 02/03/20  1738 02/04/20  0300   *  --  209* 298*  --  133*   CALCIUM 9.4  --  9.3 8.5*  --  9.5   ALBUMIN 3.6  --   --  3.1*  --  3.5   PROT 7.7  --   --  6.7  --  7.5   *  132*   < > 129* 126*  --  135*   K 4.3   4.3  --  4.3 3.9  3.9 4.4 3.8  3.8   CO2 25  --  24 22*  --  23   CL 95  --  92* 89*  --  96   BUN 36*  --  39* 39*  --  38*   CREATININE 1.5*  --  1.8* 1.6*  --  1.6*   ALKPHOS 93  --   --  89  --  103   ALT 20  --   --  22  --  25   AST 25  --   --  24  --  28   BILITOT 1.1*  --   --  1.3*  --  1.4*    < > = values in this interval not displayed.      Coagulation:   Recent Labs   Lab 02/03/20  0317 02/03/20  0540 02/04/20  0300   APTT >150.0* 43.3* 42.4*     LDH:  No results for input(s): LDH in the last 72 hours.  Microbiology:  Microbiology Results (last 7 days)     Procedure Component Value Units Date/Time    Blood culture [[id number]] Collected:  02/03/20 0946    Order Status:  Completed Specimen:  Blood from Peripheral, Hand, Right Updated:  02/03/20 1715     Blood Culture, Routine No Growth to date    Blood culture [[id number]] Collected:  02/03/20 0946    Order Status:  Completed Specimen:  Blood from Peripheral, Hand, Right Updated:  02/03/20 1715     Blood Culture, Routine No Growth to date        I have reviewed all pertinent labs within the past 24 hours.    Estimated Creatinine Clearance: 55.7 mL/min (A) (based on SCr of 1.6 mg/dL (H)).    Diagnostic Results:  I have reviewed and interpreted all pertinent imaging results/findings within the past 24 hours.    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 Chronic MARC - Class II-III and mild LE edema.      Hospital Course (synopsis of major diagnoses, care, treatment, and services provided during the course of the hospital stay):  -2/12 admit to CDU for diuresis with IV lasix  -IV abx   -CXR with suspected small left pleural effusion with associated left basilar atelectasis versus evolving airspace disease  -2/13 single chamber ICD implant; IV lasix decreased to  BID  -2/16 add dobutamine, hold BB due to hypotension, no ACE-I or ARB due to recent HPI hypotension  -2/17 Lasix changed to PO  -2/18 dobutamine discontinued    Admitted to [facility] on Thursday due to severe SOB for a week with associated orthopnea, MARC, and PND unable to lie flat and found to be in acute diastolic heart failure. States he normally weighs around 219 but up to 254lb on admission tonight. Says he hasn't been the most compliant in terms of fluid restriction and daily weights but has avoided salt. BNP on admission was 2375. BUN/CRT 32/1.4 He was started on IV diuretics but continued to deteriorate. Creatinine continued to increase and reached 4.3 with oliguria. He was started on CRRT for a few days and tolerated well. Renal u/s was negative for obstruction and liver u/s without findings of cirrhosis. All of this was progression of cardiorenal syndrome with liver congestion from severe volume overload. On arrival vitals stable and in no acute distress. He has obvious anasarca up to the chest. He did have uop of 500 at time of arrival also.    TTE 5/28/19  · Moderate left ventricular enlargement.  · Severely decreased left ventricular systolic function. The estimated ejection fraction is 20%  · Global hypokinetic wall motion.  · Grade III (severe) left ventricular diastolic dysfunction consistent with restrictive physiology.  · Severe left atrial enlargement.  · Moderate right ventricular enlargement.  · Low normal right ventricular systolic function.  · Mild right atrial enlargement.  · Moderate mitral regurgitation.  Mild to moderate tricuspid regurgitation    * Cardiogenic shock  -NICM; Likely Etoh induced  -Transferred from [facility] with IABP at 1:1 for further level of care. IABP removed 1/25 and replaced 2/3.   -RHC 1/27 on  5, Epi 0.04, Radha 20 ppm and Lasix 40 mg/hr: RA 12, PAP 38/22 (30), W 20 and CO/CI 4.11/2.06.   - ScVO2 64. CVP ~25. Calculated CO/CI 3.8/2.7. SVR  ~1200.  - IABP 1:1(placed 2/3). Continue Epi 0.04,  5, Radha 20(will increase to 40ppm).   - Continue Lasix 40mg/hr. Will add dose of diuril this am and follow, He received dose of Tolvaptan yesterday.   - GDMT: Holding Hyd/Isordil in anticipation VAD  - Echo 2/3:  EF 15%, LVIDD 6.52, TAPSE 1.3, Mod MR, Mild/Mod TR.   - He has completed the pathway and was presented at selection 1/29/20: It was the committee decision to decline the pt for transplant listing due to debility, poor renal function and lack of backup caregiver. Potential VAD placement scheduled on 2/6 with Dr Joshi. Will order pre VAD UA/cultures completed and PICC removed.       ANJELICA (acute kidney injury)  -Creatinine was 3.0 on admit and got as high as 4.3 at OSH prior to transfer. Renal function was normal 8 months ago.  -Trending down today.   -Cont to monitor with diuresis     Acute hyperglycemia  -HgA1C 6.6  -Endocrine following    Moderate malnutrition  - Boost glucose control added 1/27   - Prealbumin is 23    Morbid obesity  -Weight down considerably since admit with diuresis.    Deep vein thrombosis (DVT) of right lower extremity  -Unsure of timing but was on eliquis PTA.   -Continue on heparin gtt for now      AICD (automatic cardioverter/defibrillator) present  -Medtronic single chamber ICD placed 2019 for primary prevention    Uninterrupted Critical Care/Counseling Time (not including procedures): 60mn  Mickey Rider NP  Heart Transplant  [facility]

## 2020-02-04 NOTE — BRIEF OP NOTE
"    Post Cath Note  Referring Physician: Jeanette Tafoya MD  Procedure: INSERTION, INTRAAORTIC BALLOON PUMP INSERTION    Intervention:     Procedure done at bedside under sterile conditions - patient fully prepped and draped.    R CFA access was obtained using ultrasound guidance using a 4Fr Micropunture kit, upsized to an 8Fr sheath.    Successful insertion of a 40 cc 7.5 Fr IABP via R CFA access through 8Fr sheath.    Sheath secured with suture and IABP secured with IABP stat locks and a clear dressing.    IABP placement confirmed on CXR.    Post Cath Exam:   /73 (BP Location: Right arm, Patient Position: Lying)   Pulse (!) 112   Temp 98.3 °F (36.8 °C) (Oral)   Resp (!) 24   Ht 5' 7" (1.702 m)   Wt 90.7 kg (200 lb)   SpO2 100%   BMI 31.32 kg/m²   No unusual pain, hematoma, thrill or bruit at vascular access site.  Distal pulse present without signs of ischemia.    Recommendations:   - Routine post-cath care  - Continue heparin gtt while IABP in place  - Management per HTS    Signed:  Kit Orozco MD  Interventional Cardiology Fellow, PGY-7  2/3/2020 9:55 PM  "

## 2020-02-04 NOTE — PT/OT/SLP PROGRESS
Physical Therapy Treatment/ Discharge Summary    Patient Name:  Saba Lott   MRN:  [id number]    Recommendations:     Discharge Recommendations:  outpatient PT   Discharge Equipment Recommendations: none   Barriers to discharge: None    Assessment:     Saba Lott is a 54 y.o. male admitted with a medical diagnosis of Cardiogenic shock.  He presents with the following impairments/functional limitations:  weakness, impaired endurance, impaired self care skills, impaired functional mobilty, gait instability, impaired balance, decreased lower extremity function, other (comment)(femoral IABP) .     Pt krista session well w/ good participation. Pt is limited in ability to complete therapy at this time due to right femoral IABP. He is IND w/ supine therex w/ BUE and LLE. He will be D/C'd at this time. Please reconsult after IABP is removed or when appropriate for therapy..    Rehab Prognosis: Good; patient would benefit from acute skilled PT services to address these deficits and reach maximum level of function.    Recent Surgery: Procedure(s) (LRB):  INSERTION, CATHETER, RIGHT HEART (Right) 8 Days Post-Op    Plan:     During this hospitalization, patient to be seen (none- D/C) to address the identified rehab impairments via gait training, therapeutic activities, therapeutic exercises and progress toward the following goals:    · Plan of Care Expires:  02/23/20    Subjective     Chief Complaint: none  Patient/Family Comments/goals: return to PLOF  Pain/Comfort:  · Pain Rating 1: 0/10  · Pain Rating Post-Intervention 1: 0/10      Objective:     Communicated with nursing prior to session.  Patient found supine with blood pressure cuff, barton catheter, knee immobilizer, peripheral IV, PICC line, pulse ox (continuous), telemetry(right femoral IABP) upon PT entry to room.     General Precautions: Standard, fall   Orthopedic Precautions:N/A   Braces: N/A     Functional Mobility:  · Not performed due to right femoral  IABP      AM-PAC 6 CLICK MOBILITY  Turning over in bed (including adjusting bedclothes, sheets and blankets)?: 3  Sitting down on and standing up from a chair with arms (e.g., wheelchair, bedside commode, etc.): 3  Moving from lying on back to sitting on the side of the bed?: 3  Moving to and from a bed to a chair (including a wheelchair)?: 3  Need to walk in hospital room?: 3  Climbing 3-5 steps with a railing?: 3  Basic Mobility Total Score: 18       Therapeutic Activities and Exercises:  Supine BUE therex 2x15 reps (chest press, bicep curls, shoulder flexion)  Supine LLE therex 2x15 reps (limited heel slides, APs)  Pt IND w/ therex listed above and instructed to complete therex 2-3x/day. Pt verb understanding.    Patient left supine with all lines intact, call button in reach and nurse present..    GOALS:   Multidisciplinary Problems     Physical Therapy Goals        Problem: Physical Therapy Goal    Goal Priority Disciplines Outcome Goal Variances Interventions   Physical Therapy Goal     PT, PT/OT Ongoing, Progressing     Description:  Goals to be met by: 2020     Patient will increase functional independence with mobility by performin. Supine to sit with Set-up [place]- met 2020  2. Sit to stand transfer with Supervision- not met  3. Gait  x 150 feet with Supervision using LRAD or no AD -not met  4. Lower extremity exercise program x10 reps per handout, with independence -not met                        Time Tracking:     PT Received On: 20  PT Start Time: 1232     PT Stop Time: 1241  PT Total Time (min): 9 min     Billable Minutes: Therapeutic Exercise 9 and Total Time 9    Treatment Type: Treatment  PT/PTA: PT     PTA Visit Number: 0     Merry Moscoso, DWAIN  2020

## 2020-02-04 NOTE — PLAN OF CARE
CMICU DAILY GOALS       A: Awake    RASS: Goal - RASS Goal: 0-->alert and calm  Actual - RASS ([place] Agitation-Sedation Scale): 0-->alert and calm   Restraint necessity: Clinical Justification: Removing medical devices  B: Breath   SBT: Not intubated   C: Coordinate A & B, analgesics/sedatives   Pain: managed    SAT: Not intubated  D: Delirium   CAM-ICU: Overall CAM-ICU: Negative  E: Early Mobility   MOVE Screen: Pass   Activity: Activity Management: bedrest maintained per order, activity minimized  FAS: Feeding/Nutrition   Diet order: Diet/Nutrition Received: restrict fluids,   Fluid restriction: Fluid Requirement: 1L  T: Thrombus   DVT prophylaxis: VTE Required Core Measure: Pharmacological prophylaxis initiated/maintained  H: HOB Elevation   Head of Bed (HOB): HOB at 45 degrees  U: Ulcer Prophylaxis   GI: yes  G: Glucose control   managed    S: Skin   Bundle compliance: yes   Bathing/Skin Care: bath, chlorhexidine Date: 02/03/20  B: Bowel Function   diarrhea   I: Indwelling Catheters   Mc necessity: [REMOVED]      Urethral Catheter 01/15/20 2030 Straight-tip 16 Fr.-Reason for Continuing Urinary Catheterization: Critically ill in ICU requiring intensive monitoring   CVC necessity: Yes   IPAD offered: Yes  D: De-escalation Antibx   Yes  Plan for the day   Manage fluid retention (CHF) for safe operable future LVAD procedure. Document accurate I/Os and documenting accurate CVPs now that a triple lumen CVC is applicable. Plan from HTS fellow is to place femoral IABP for cardiac support. Consent is been obtained by Dr. Parks and is in the pt's chart. Lasix drip increased from 30mg/hr to 40mg/hr. Epi @ 0.04mcg, Dobutamine @ 5mcg.  Family/Goals of care/Code Status   Code Status: Full Code     No acute events throughout day, VS and assessment per flow sheet, patient progressing towards goals as tolerated, plan of care reviewed with Saba Lott and family, all concerns addressed, will continue to monitor.

## 2020-02-05 ENCOUNTER — ANESTHESIA EVENT (OUTPATIENT)
Dept: SURGERY | Facility: HOSPITAL | Age: 55
DRG: 001 | End: 2020-02-05
Payer: MEDICARE

## 2020-02-05 ENCOUNTER — EPISODE CHANGES (OUTPATIENT)
Dept: TRANSPLANT | Facility: CLINIC | Age: 55
End: 2020-02-05

## 2020-02-05 LAB
ALBUMIN SERPL BCP-MCNC: 3.8 G/DL (ref 3.5–5.2)
ALLENS TEST: ABNORMAL
ALLENS TEST: ABNORMAL
ALP SERPL-CCNC: 119 U/L (ref 55–135)
ALT SERPL W/O P-5'-P-CCNC: 26 U/L (ref 10–44)
ANION GAP SERPL CALC-SCNC: 12 MMOL/L (ref 8–16)
ANION GAP SERPL CALC-SCNC: 14 MMOL/L (ref 8–16)
ANION GAP SERPL CALC-SCNC: 14 MMOL/L (ref 8–16)
APTT BLDCRRT: 56.6 SEC (ref 21–32)
AST SERPL-CCNC: 28 U/L (ref 10–40)
BASOPHILS # BLD AUTO: 0.05 K/UL (ref 0–0.2)
BASOPHILS NFR BLD: 0.4 % (ref 0–1.9)
BILIRUB SERPL-MCNC: 1.4 MG/DL (ref 0.1–1)
BNP SERPL-MCNC: 1865 PG/ML (ref 0–99)
BUN SERPL-MCNC: 35 MG/DL (ref 6–20)
BUN SERPL-MCNC: 35 MG/DL (ref 6–20)
BUN SERPL-MCNC: 39 MG/DL (ref 6–20)
CALCIUM SERPL-MCNC: 10.2 MG/DL (ref 8.7–10.5)
CALCIUM SERPL-MCNC: 10.2 MG/DL (ref 8.7–10.5)
CALCIUM SERPL-MCNC: 10.3 MG/DL (ref 8.7–10.5)
CHLORIDE SERPL-SCNC: 88 MMOL/L (ref 95–110)
CHLORIDE SERPL-SCNC: 89 MMOL/L (ref 95–110)
CHLORIDE SERPL-SCNC: 89 MMOL/L (ref 95–110)
CO2 SERPL-SCNC: 30 MMOL/L (ref 23–29)
CO2 SERPL-SCNC: 33 MMOL/L (ref 23–29)
CO2 SERPL-SCNC: 35 MMOL/L (ref 23–29)
CREAT SERPL-MCNC: 1.6 MG/DL (ref 0.5–1.4)
CREAT SERPL-MCNC: 1.6 MG/DL (ref 0.5–1.4)
CREAT SERPL-MCNC: 1.7 MG/DL (ref 0.5–1.4)
DELSYS: ABNORMAL
DIFFERENTIAL METHOD: ABNORMAL
EOSINOPHIL # BLD AUTO: 0.2 K/UL (ref 0–0.5)
EOSINOPHIL NFR BLD: 2 % (ref 0–8)
ERYTHROCYTE [DISTWIDTH] IN BLOOD BY AUTOMATED COUNT: 18.2 % (ref 11.5–14.5)
EST. GFR  (AFRICAN AMERICAN): 51.3 ML/MIN/1.73 M^2
EST. GFR  (AFRICAN AMERICAN): 55.2 ML/MIN/1.73 M^2
EST. GFR  (AFRICAN AMERICAN): 55.2 ML/MIN/1.73 M^2
EST. GFR  (NON AFRICAN AMERICAN): 44.4 ML/MIN/1.73 M^2
EST. GFR  (NON AFRICAN AMERICAN): 47.8 ML/MIN/1.73 M^2
EST. GFR  (NON AFRICAN AMERICAN): 47.8 ML/MIN/1.73 M^2
FLOW: 4
GLUCOSE SERPL-MCNC: 128 MG/DL (ref 70–110)
GLUCOSE SERPL-MCNC: 143 MG/DL (ref 70–110)
GLUCOSE SERPL-MCNC: 154 MG/DL (ref 70–110)
HCO3 UR-SCNC: 33 MMOL/L (ref 24–28)
HCO3 UR-SCNC: 33.4 MMOL/L (ref 24–28)
HCT VFR BLD AUTO: 32.6 % (ref 40–54)
HGB BLD-MCNC: 10.6 G/DL (ref 14–18)
IMM GRANULOCYTES # BLD AUTO: 0.11 K/UL (ref 0–0.04)
IMM GRANULOCYTES NFR BLD AUTO: 1 % (ref 0–0.5)
LYMPHOCYTES # BLD AUTO: 1.8 K/UL (ref 1–4.8)
LYMPHOCYTES NFR BLD: 16.3 % (ref 18–48)
MAGNESIUM SERPL-MCNC: 2.3 MG/DL (ref 1.6–2.6)
MCH RBC QN AUTO: 26.8 PG (ref 27–31)
MCHC RBC AUTO-ENTMCNC: 32.5 G/DL (ref 32–36)
MCV RBC AUTO: 83 FL (ref 82–98)
METHEMOGLOBIN: 0.5 % (ref 0–3)
MODE: ABNORMAL
MONOCYTES # BLD AUTO: 0.8 K/UL (ref 0.3–1)
MONOCYTES NFR BLD: 6.6 % (ref 4–15)
NEUTROPHILS # BLD AUTO: 8.3 K/UL (ref 1.8–7.7)
NEUTROPHILS NFR BLD: 73.7 % (ref 38–73)
NRBC BLD-RTO: 0 /100 WBC
PCO2 BLDA: 46.9 MMHG (ref 35–45)
PCO2 BLDA: 49.6 MMHG (ref 35–45)
PH SMN: 7.44 [PH] (ref 7.35–7.45)
PH SMN: 7.46 [PH] (ref 7.35–7.45)
PLATELET # BLD AUTO: 385 K/UL (ref 150–350)
PMV BLD AUTO: 9.7 FL (ref 9.2–12.9)
PO2 BLDA: 28 MMHG (ref 40–60)
PO2 BLDA: 38 MMHG (ref 40–60)
POC BE: 9 MMOL/L
POC BE: 9 MMOL/L
POC SATURATED O2: 55 % (ref 95–100)
POC SATURATED O2: 72 % (ref 95–100)
POC TCO2: 34 MMOL/L (ref 24–29)
POC TCO2: 35 MMOL/L (ref 24–29)
POCT GLUCOSE: 122 MG/DL (ref 70–110)
POCT GLUCOSE: 124 MG/DL (ref 70–110)
POCT GLUCOSE: 145 MG/DL (ref 70–110)
POCT GLUCOSE: 157 MG/DL (ref 70–110)
POCT GLUCOSE: 158 MG/DL (ref 70–110)
POTASSIUM SERPL-SCNC: 3.8 MMOL/L (ref 3.5–5.1)
POTASSIUM SERPL-SCNC: 3.9 MMOL/L (ref 3.5–5.1)
POTASSIUM SERPL-SCNC: 4 MMOL/L (ref 3.5–5.1)
POTASSIUM SERPL-SCNC: 4 MMOL/L (ref 3.5–5.1)
PROT SERPL-MCNC: 8.2 G/DL (ref 6–8.4)
RBC # BLD AUTO: 3.95 M/UL (ref 4.6–6.2)
SAMPLE: ABNORMAL
SAMPLE: ABNORMAL
SITE: ABNORMAL
SITE: ABNORMAL
SODIUM SERPL-SCNC: 133 MMOL/L (ref 136–145)
SODIUM SERPL-SCNC: 135 MMOL/L (ref 136–145)
SODIUM SERPL-SCNC: 136 MMOL/L (ref 136–145)
SP02: 98
WBC # BLD AUTO: 11.3 K/UL (ref 3.9–12.7)

## 2020-02-05 PROCEDURE — 63600175 PHARM REV CODE 636 W HCPCS: Performed by: STUDENT IN AN ORGANIZED HEALTH CARE EDUCATION/TRAINING PROGRAM

## 2020-02-05 PROCEDURE — 99900035 HC TECH TIME PER 15 MIN (STAT)

## 2020-02-05 PROCEDURE — 63600367 HC NITRIC OXIDE PER HOUR

## 2020-02-05 PROCEDURE — 25000003 PHARM REV CODE 250: Performed by: STUDENT IN AN ORGANIZED HEALTH CARE EDUCATION/TRAINING PROGRAM

## 2020-02-05 PROCEDURE — 25000003 PHARM REV CODE 250: Performed by: INTERNAL MEDICINE

## 2020-02-05 PROCEDURE — 80053 COMPREHEN METABOLIC PANEL: CPT

## 2020-02-05 PROCEDURE — 27000202 HC IABP, ADD'L DAY

## 2020-02-05 PROCEDURE — 94761 N-INVAS EAR/PLS OXIMETRY MLT: CPT

## 2020-02-05 PROCEDURE — 82803 BLOOD GASES ANY COMBINATION: CPT

## 2020-02-05 PROCEDURE — 27000221 HC OXYGEN, UP TO 24 HOURS

## 2020-02-05 PROCEDURE — 25000003 PHARM REV CODE 250: Performed by: NURSE PRACTITIONER

## 2020-02-05 PROCEDURE — 83880 ASSAY OF NATRIURETIC PEPTIDE: CPT

## 2020-02-05 PROCEDURE — 63600175 PHARM REV CODE 636 W HCPCS: Performed by: NURSE PRACTITIONER

## 2020-02-05 PROCEDURE — 99291 PR CRITICAL CARE, E/M 30-74 MINUTES: ICD-10-PCS | Mod: ,,, | Performed by: PHYSICIAN ASSISTANT

## 2020-02-05 PROCEDURE — 85730 THROMBOPLASTIN TIME PARTIAL: CPT

## 2020-02-05 PROCEDURE — 25000003 PHARM REV CODE 250: Performed by: HOSPITALIST

## 2020-02-05 PROCEDURE — 99291 CRITICAL CARE FIRST HOUR: CPT | Mod: ,,, | Performed by: PHYSICIAN ASSISTANT

## 2020-02-05 PROCEDURE — 20000000 HC ICU ROOM

## 2020-02-05 PROCEDURE — 80048 BASIC METABOLIC PNL TOTAL CA: CPT | Mod: 91

## 2020-02-05 PROCEDURE — 85025 COMPLETE CBC W/AUTO DIFF WBC: CPT

## 2020-02-05 PROCEDURE — 83050 HGB METHEMOGLOBIN QUAN: CPT

## 2020-02-05 PROCEDURE — 83735 ASSAY OF MAGNESIUM: CPT

## 2020-02-05 PROCEDURE — 63600175 PHARM REV CODE 636 W HCPCS: Performed by: HOSPITALIST

## 2020-02-05 RX ORDER — POTASSIUM CHLORIDE 20 MEQ/1
40 TABLET, EXTENDED RELEASE ORAL ONCE
Status: COMPLETED | OUTPATIENT
Start: 2020-02-05 | End: 2020-02-05

## 2020-02-05 RX ORDER — MUPIROCIN 20 MG/G
1 OINTMENT TOPICAL
Status: DISCONTINUED | OUTPATIENT
Start: 2020-02-05 | End: 2020-02-06 | Stop reason: HOSPADM

## 2020-02-05 RX ORDER — HYDROCODONE BITARTRATE AND ACETAMINOPHEN 500; 5 MG/1; MG/1
TABLET ORAL
Status: DISCONTINUED | OUTPATIENT
Start: 2020-02-05 | End: 2020-02-06

## 2020-02-05 RX ORDER — MUPIROCIN 20 MG/G
OINTMENT TOPICAL
Status: DISCONTINUED | OUTPATIENT
Start: 2020-02-05 | End: 2020-02-06 | Stop reason: HOSPADM

## 2020-02-05 RX ORDER — CEFEPIME HYDROCHLORIDE 2 G/1
2 INJECTION, POWDER, FOR SOLUTION INTRAVENOUS
Status: COMPLETED | OUTPATIENT
Start: 2020-02-06 | End: 2020-02-06

## 2020-02-05 RX ADMIN — FUROSEMIDE 40 MG/HR: 10 INJECTION, SOLUTION INTRAMUSCULAR; INTRAVENOUS at 10:02

## 2020-02-05 RX ADMIN — CHLOROTHIAZIDE SODIUM 500 MG: 500 INJECTION, POWDER, LYOPHILIZED, FOR SOLUTION INTRAVENOUS at 08:02

## 2020-02-05 RX ADMIN — HEPARIN SODIUM AND DEXTROSE 17 UNITS/KG/HR: 10000; 5 INJECTION INTRAVENOUS at 05:02

## 2020-02-05 RX ADMIN — POTASSIUM CHLORIDE 40 MEQ: 1500 TABLET, EXTENDED RELEASE ORAL at 04:02

## 2020-02-05 RX ADMIN — Medication 6 MG: at 09:02

## 2020-02-05 RX ADMIN — DOBUTAMINE IN DEXTROSE 5 MCG/KG/MIN: 200 INJECTION, SOLUTION INTRAVENOUS at 09:02

## 2020-02-05 RX ADMIN — FUROSEMIDE 40 MG/HR: 10 INJECTION, SOLUTION INTRAMUSCULAR; INTRAVENOUS at 05:02

## 2020-02-05 RX ADMIN — DOBUTAMINE IN DEXTROSE 5 MCG/KG/MIN: 200 INJECTION, SOLUTION INTRAVENOUS at 05:02

## 2020-02-05 RX ADMIN — TRIAMCINOLONE ACETONIDE: 5 CREAM TOPICAL at 04:02

## 2020-02-05 RX ADMIN — Medication 800 MG: at 09:02

## 2020-02-05 RX ADMIN — Medication 800 MG: at 08:02

## 2020-02-05 RX ADMIN — ATORVASTATIN CALCIUM 40 MG: 20 TABLET, FILM COATED ORAL at 08:02

## 2020-02-05 RX ADMIN — TRIAMCINOLONE ACETONIDE: 5 CREAM TOPICAL at 08:02

## 2020-02-05 RX ADMIN — EPINEPHRINE 0.04 MCG/KG/MIN: 1 INJECTION PARENTERAL at 03:02

## 2020-02-05 RX ADMIN — POLYETHYLENE GLYCOL 3350 17 G: 17 POWDER, FOR SOLUTION ORAL at 08:02

## 2020-02-05 RX ADMIN — PHYTONADIONE 10 MG: 10 INJECTION, EMULSION INTRAMUSCULAR; INTRAVENOUS; SUBCUTANEOUS at 05:02

## 2020-02-05 RX ADMIN — STANDARDIZED SENNA CONCENTRATE AND DOCUSATE SODIUM 2 TABLET: 8.6; 5 TABLET ORAL at 08:02

## 2020-02-05 RX ADMIN — FAMOTIDINE 40 MG: 40 POWDER, FOR SUSPENSION ORAL at 08:02

## 2020-02-05 RX ADMIN — CETIRIZINE HYDROCHLORIDE 10 MG: 10 TABLET, FILM COATED ORAL at 08:02

## 2020-02-05 RX ADMIN — TRIAMCINOLONE ACETONIDE: 5 CREAM TOPICAL at 09:02

## 2020-02-05 RX ADMIN — CHLOROTHIAZIDE SODIUM 500 MG: 500 INJECTION, POWDER, LYOPHILIZED, FOR SOLUTION INTRAVENOUS at 09:02
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
## 2020-02-05 NOTE — ASSESSMENT & PLAN NOTE
-NICM; Likely Etoh induced  -Transferred from [facility] with IABP at 1:1 for further level of care. IABP removed 1/25 and replaced 2/3.   -RHC 1/27 on  5, Epi 0.04, Radha 20 ppm and Lasix 40 mg/hr: RA 12, PAP 38/22 (30), W 20 and CO/CI 4.11/2.06.   - ScVO2 64. CVP ~25. Calculated CO/CI 3.8/2.7. SVR ~1200.  - IABP 1:1(placed 2/3). Continue Epi 0.04,  5, Radha 20 ( increased to 40ppm).   - Continue Lasix 40mg/hr.diuril BID   - GDMT: Holding Hyd/Isordil in anticipation VAD  - Echo 2/3:  EF 15%, LVIDD 6.52, TAPSE 1.3, Mod MR, Mild/Mod TR.   - He has completed the pathway and was presented at selection 1/29/20: It was the committee decision to decline the pt for transplant listing due to debility, poor renal function and lack of backup caregiver. Potential VAD placement scheduled on 2/6 with Dr Joshi. ordered pre VAD UA/cultures completed and PICC removed.

## 2020-02-05 NOTE — PROGRESS NOTES
[facility]  Heart Transplant  Progress Note    Patient Name: Saba Lott  MRN: [id number]  Admission Date: 1/15/2020  Hospital Length of Stay: 21 days  Attending Physician: Jeanette Tafoya MD  Primary Care Provider: Primary Doctor No  Principal Problem:Cardiogenic shock    Subjective:     Interval History: given extra dose of diuril overnight with improvement in CVP to 13     Continuous Infusions:   DOBUTamine 5 mcg/kg/min (02/05/20 0600)    epinephrine infustion (NON-TITRATING) 0.04 mcg/kg/min (02/05/20 0600)    furosemide (LASIX) 10 mg/mL infusion (non-titrating) 40 mg/hr (02/05/20 0600)    heparin (porcine) in D5W 16.999 Units/kg/hr (02/05/20 0600)    nitric oxide gas       Scheduled Meds:   atorvastatin  40 mg Oral Daily    cetirizine  10 mg Oral Daily    chlorothiazide (DIURIL) IVPB  500 mg Intravenous Q12H    famotidine  40 mg Oral Daily    magnesium oxide  800 mg Oral BID    magnesium sulfate IVPB  1 g Intravenous Once    melatonin  6 mg Oral Nightly    polyethylene glycol  17 g Oral Daily    senna-docusate 8.6-50 mg  2 tablet Oral BID    triamcinolone acetonide 0.5%   Topical (Top) TID     PRN Meds:acetaminophen, albuterol, bisacodyL, Dextrose 10% Bolus, Dextrose 10% Bolus, glucagon (human recombinant), glucose, glucose, heparin (PORCINE), heparin (PORCINE), hepatitis B, hydrOXYzine HCl, insulin aspart U-100, pneumococcal vaccine, sodium chloride 0.9%, traMADol    Review of patient's allergies indicates:   Allergen Reactions    Iodine and iodide containing products      Objective:     Vital Signs (Most Recent):  Temp: 98.1 °F (36.7 °C) (02/05/20 0715)  Pulse: 101 (02/05/20 0804)  Resp: (!) 23 (02/05/20 0804)  BP: 129/68 (02/05/20 0804)  SpO2: 100 % (02/05/20 0804) Vital Signs (24h Range):  Temp:  [97.2 °F (36.2 °C)-98.2 °F (36.8 °C)] 98.1 °F (36.7 °C)  Pulse:  [] 101  Resp:  [18-39] 23  SpO2:  [95 %-100 %] 100 %  BP: (109-140)/(62-88) 129/68     Patient Vitals for the  past 72 hrs (Last 3 readings):   Weight   02/05/20 0600 87.9 kg (193 lb 12.6 oz)   02/04/20 0500 89.7 kg (197 lb 12 oz)   02/03/20 1413 90.7 kg (200 lb)     Body mass index is 30.35 kg/m².      Intake/Output Summary (Last 24 hours) at 2/5/2020 0839  Last data filed at 2/5/2020 0800  Gross per 24 hour   Intake 2129.61 ml   Output 7650 ml   Net -5520.39 ml       Hemodynamic Parameters:           Physical Exam   Constitutional: He is oriented to person, place, and time. He appears well-developed and well-nourished.   HENT:   Head: Normocephalic and atraumatic.   Eyes: Pupils are equal, round, and reactive to light. EOM are normal.   Neck: JVD present.   RIJ TLC place 2/3/20.   Cardiovascular: Normal rate and regular rhythm.   Murmur heard.  IABP 1:1 placed 2/3/20.   Pulmonary/Chest: Effort normal and breath sounds normal. No respiratory distress. He has no wheezes. He has no rales.   Abdominal: Soft. Bowel sounds are normal. He exhibits distension. There is no tenderness.   Musculoskeletal: Normal range of motion.   Neurological: He is oriented to person, place, and time. No cranial nerve deficit.   Skin: Skin is warm and dry. Capillary refill takes less than 2 seconds.   Psychiatric: He has a normal mood and affect. His behavior is normal.       Significant Labs:  CBC:  Recent Labs   Lab 02/03/20  0317 02/04/20  0300 02/05/20  0300   WBC 11.67 11.15 11.30   RBC 3.57* 3.83* 3.95*   HGB 9.5* 10.2* 10.6*   HCT 30.1* 32.2* 32.6*   * 373* 385*   MCV 84 84 83   MCH 26.6* 26.6* 26.8*   MCHC 31.6* 31.7* 32.5     BNP:  Recent Labs   Lab 02/02/20  1511 02/05/20  0300   BNP 3,069* 1,865*     CMP:  Recent Labs   Lab 02/03/20  0317  02/04/20  0300 02/04/20  1302 02/04/20 1937 02/05/20  0300   *  --  133* 167* 158* 128*   CALCIUM 8.5*  --  9.5 9.5 10.0 10.2   ALBUMIN 3.1*  --  3.5  --   --  3.8   PROT 6.7  --  7.5  --   --  8.2   *  --  135* 135* 134* 133*   K 3.9  3.9   < > 3.8  3.8 4.1 4.4 4.0  4.0   CO2  22*  --  23 27 30* 30*   CL 89*  --  96 96 95 89*   BUN 39*  --  38* 35* 37* 35*   CREATININE 1.6*  --  1.6* 1.7* 1.7* 1.6*   ALKPHOS 89  --  103  --   --  119   ALT 22  --  25  --   --  26   AST 24  --  28  --   --  28   BILITOT 1.3*  --  1.4*  --   --  1.4*    < > = values in this interval not displayed.      Coagulation:   Recent Labs   Lab 02/03/20  0540 02/04/20  0300 02/05/20  0300   APTT 43.3* 42.4* 56.6*     LDH:  No results for input(s): LDH in the last 72 hours.  Microbiology:  Microbiology Results (last 7 days)     Procedure Component Value Units Date/Time    Blood culture [[id number]] Collected:  02/03/20 0946    Order Status:  Completed Specimen:  Blood from Peripheral, Hand, Right Updated:  02/04/20 1022     Blood Culture, Routine No Growth to date      No Growth to date    Blood culture [[id number]] Collected:  02/03/20 0946    Order Status:  Completed Specimen:  Blood from Peripheral, Hand, Right Updated:  02/04/20 1022     Blood Culture, Routine No Growth to date      No Growth to date          I have reviewed all pertinent labs within the past 24 hours.    Estimated Creatinine Clearance: 55.2 mL/min (A) (based on SCr of 1.6 mg/dL (H)).    Diagnostic Results:  I have reviewed and interpreted all pertinent imaging results/findings within the past 24 hours.    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 Chronic MARC - Class II-III and mild LE edema.      Hospital Course (synopsis of major diagnoses, care, treatment, and services provided during the course of the hospital stay):  -2/12 admit to CDU for diuresis with IV lasix  -IV abx   -CXR with suspected small left pleural effusion with associated left basilar atelectasis versus evolving airspace disease  -2/13 single chamber ICD implant; IV lasix decreased to  BID  -2/16 add dobutamine, hold BB due to hypotension, no ACE-I or ARB due to recent HPI hypotension  -2/17 Lasix changed to PO  -2/18 dobutamine discontinued    Admitted to [facility] on Thursday due to severe SOB for a week with associated orthopnea, MARC, and PND unable to lie flat and found to be in acute diastolic heart failure. States he normally weighs around 219 but up to 254lb on admission tonight. Says he hasn't been the most compliant in terms of fluid restriction and daily weights but has avoided salt. BNP on admission was 2375. BUN/CRT 32/1.4 He was started on IV diuretics but continued to deteriorate. Creatinine continued to increase and reached 4.3 with oliguria. He was started on CRRT for a few days and tolerated well. Renal u/s was negative for obstruction and liver u/s without findings of cirrhosis. All of this was progression of cardiorenal syndrome with liver congestion from severe volume overload. On arrival vitals stable and in no acute distress. He has obvious anasarca up to the chest. He did have uop of 500 at time of arrival also.    TTE 5/28/19  · Moderate left ventricular enlargement.  · Severely decreased left ventricular systolic function. The estimated ejection fraction is 20%  · Global hypokinetic wall motion.  · Grade III (severe) left ventricular diastolic dysfunction consistent with restrictive physiology.  · Severe left atrial enlargement.  · Moderate right ventricular enlargement.  · Low normal right ventricular systolic function.  · Mild right atrial enlargement.  · Moderate mitral regurgitation.  Mild to moderate tricuspid regurgitation    * Cardiogenic shock  -NICM; Likely Etoh induced  -Transferred from [facility] with IABP at 1:1 for further level of care. IABP removed 1/25 and replaced 2/3.   -RHC 1/27 on  5, Epi 0.04, Radha 20 ppm and Lasix 40 mg/hr: RA 12, PAP 38/22 (30), W 20 and CO/CI 4.11/2.06.   - ScVO2 64. CVP ~25. Calculated CO/CI 3.8/2.7. SVR  ~1200.  - IABP 1:1(placed 2/3). Continue Epi 0.04,  5, Radha 20 ( increased to 40ppm).   - Continue Lasix 40mg/hr.diuril BID   - GDMT: Holding Hyd/Isordil in anticipation VAD  - Echo 2/3:  EF 15%, LVIDD 6.52, TAPSE 1.3, Mod MR, Mild/Mod TR.   - He has completed the pathway and was presented at selection 1/29/20: It was the committee decision to decline the pt for transplant listing due to debility, poor renal function and lack of backup caregiver. Potential VAD placement scheduled on 2/6 with Dr Joshi. ordered pre VAD UA/cultures completed and PICC removed.       Acute hyperglycemia  -HgA1C 6.6  -Endocrine following    Moderate malnutrition  - Boost glucose control added 1/27   - Prealbumin is 23    Morbid obesity  -Weight down considerably since admit with diuresis.    ANJELICA (acute kidney injury)  -Creatinine was 3.0 on admit and got as high as 4.3 at OSH prior to transfer. Renal function was normal 8 months ago.  -Trending down today.   -Cont to monitor with diuresis     Deep vein thrombosis (DVT) of right lower extremity  -Unsure of timing but was on eliquis PTA.   -Continue on heparin gtt for now      AICD (automatic cardioverter/defibrillator) present  -Medtronic single chamber ICD placed 2019 for primary prevention      Uninterrupted Critical Care/Counseling Time (not including procedures): 30 minutes    CONG Selby  Heart Transplant  [facility]

## 2020-02-05 NOTE — PLAN OF CARE
Problem: Infection  Goal: Infection Symptom Resolution  Intervention: Prevent or Manage Infection  Flowsheets (Taken 2/5/2020 0618)  Fever Reduction/Comfort Measures: lightweight bedding; lightweight clothing  Infection Management: aseptic technique maintained  Isolation Precautions: protective environment initiated     CMICU DAILY GOALS       A: Awake    RASS: Goal - RASS Goal: 0-->alert and calm  Actual - RASS (Mcmillan Agitation-Sedation Scale): 0-->alert and calm   Restraint necessity: Clinical Justification: Removing medical devices  B: Breath   SBT: Not intubated   C: Coordinate A & B, analgesics/sedatives   Pain: managed    SAT: Not intubated  D: Delirium   CAM-ICU: Overall CAM-ICU: Negative  E: Early Mobility   MOVE Screen: Fail   Activity: Activity Management: activity minimized  FAS: Feeding/Nutrition   Diet order: Diet/Nutrition Received: restrict fluids, regular,   Fluid restriction: Fluid Requirement: 1000 cc FR  T: Thrombus   DVT prophylaxis: VTE Required Core Measure: Pharmacological prophylaxis initiated/maintained  H: HOB Elevation   Head of Bed (HOB): HOB flat  U: Ulcer Prophylaxis   GI: yes  G: Glucose control   managed    S: Skin   Bundle compliance: yes   Bathing/Skin Care: bath, chlorhexidine, bath, complete, incontinence care, linen changed Date: 2/4/2020  B: Bowel Function   no issues   I: Indwelling Catheters   Mc necessity: [REMOVED]      Urethral Catheter 01/15/20 2030 Straight-tip 16 Fr.-Reason for Continuing Urinary Catheterization: Critically ill in ICU requiring intensive monitoring   CVC necessity: Yes   IPAD offered: Yes  D: De-escalation Antibx   No  Plan for the day   LVAD on Thursday  Family/Goals of care/Code Status   Code Status: Full Code     No acute events throughout day, VS and assessment per flow sheet, patient progressing towards goals as tolerated, plan of care reviewed with Saba Lott and family, all concerns addressed, will continue to monitor.

## 2020-02-05 NOTE — CARE UPDATE
Brief HTS Update Note    Checked hemodynamics, UOP per signout.  CVP decreased to 15. UOP 3700cc, NN 2.0 L for the day. CO 6.7, CI 2.7, . Given additional 500mg diuril following discussion w staff. This AM MVO2 72%, MAP 90, CVP 13, CO 7.8, CI 3.8, . Scheduled for additional diuril 500mg bid.    Tommy Cochran MD  PGY-4, Cardiology  Pager [phone]

## 2020-02-05 NOTE — SUBJECTIVE & OBJECTIVE
Interval History: given extra dose of diuril overnight with improvement in CVP to 13     Continuous Infusions:   DOBUTamine 5 mcg/kg/min (02/05/20 0600)    epinephrine infustion (NON-TITRATING) 0.04 mcg/kg/min (02/05/20 0600)    furosemide (LASIX) 10 mg/mL infusion (non-titrating) 40 mg/hr (02/05/20 0600)    heparin (porcine) in D5W 16.999 Units/kg/hr (02/05/20 0600)    nitric oxide gas       Scheduled Meds:   atorvastatin  40 mg Oral Daily    cetirizine  10 mg Oral Daily    chlorothiazide (DIURIL) IVPB  500 mg Intravenous Q12H    famotidine  40 mg Oral Daily    magnesium oxide  800 mg Oral BID    magnesium sulfate IVPB  1 g Intravenous Once    melatonin  6 mg Oral Nightly    polyethylene glycol  17 g Oral Daily    senna-docusate 8.6-50 mg  2 tablet Oral BID    triamcinolone acetonide 0.5%   Topical (Top) TID     PRN Meds:acetaminophen, albuterol, bisacodyL, Dextrose 10% Bolus, Dextrose 10% Bolus, glucagon (human recombinant), glucose, glucose, heparin (PORCINE), heparin (PORCINE), hepatitis B, hydrOXYzine HCl, insulin aspart U-100, pneumococcal vaccine, sodium chloride 0.9%, traMADol    Review of patient's allergies indicates:   Allergen Reactions    Iodine and iodide containing products      Objective:     Vital Signs (Most Recent):  Temp: 98.1 °F (36.7 °C) (02/05/20 0715)  Pulse: 101 (02/05/20 0804)  Resp: (!) 23 (02/05/20 0804)  BP: 129/68 (02/05/20 0804)  SpO2: 100 % (02/05/20 0804) Vital Signs (24h Range):  Temp:  [97.2 °F (36.2 °C)-98.2 °F (36.8 °C)] 98.1 °F (36.7 °C)  Pulse:  [] 101  Resp:  [18-39] 23  SpO2:  [95 %-100 %] 100 %  BP: (109-140)/(62-88) 129/68     Patient Vitals for the past 72 hrs (Last 3 readings):   Weight   02/05/20 0600 87.9 kg (193 lb 12.6 oz)   02/04/20 0500 89.7 kg (197 lb 12 oz)   02/03/20 1413 90.7 kg (200 lb)     Body mass index is 30.35 kg/m².      Intake/Output Summary (Last 24 hours) at 2/5/2020 0839  Last data filed at 2/5/2020 0800  Gross per 24 hour    Intake 2129.61 ml   Output 7650 ml   Net -5520.39 ml       Hemodynamic Parameters:           Physical Exam   Constitutional: He is oriented to person, place, and time. He appears well-developed and well-nourished.   HENT:   Head: Normocephalic and atraumatic.   Eyes: Pupils are equal, round, and reactive to light. EOM are normal.   Neck: JVD present.   RIJ TLC place 2/3/20.   Cardiovascular: Normal rate and regular rhythm.   Murmur heard.  IABP 1:1 placed 2/3/20.   Pulmonary/Chest: Effort normal and breath sounds normal. No respiratory distress. He has no wheezes. He has no rales.   Abdominal: Soft. Bowel sounds are normal. He exhibits distension. There is no tenderness.   Musculoskeletal: Normal range of motion.   Neurological: He is oriented to person, place, and time. No cranial nerve deficit.   Skin: Skin is warm and dry. Capillary refill takes less than 2 seconds.   Psychiatric: He has a normal mood and affect. His behavior is normal.       Significant Labs:  CBC:  Recent Labs   Lab 02/03/20 0317 02/04/20  0300 02/05/20  0300   WBC 11.67 11.15 11.30   RBC 3.57* 3.83* 3.95*   HGB 9.5* 10.2* 10.6*   HCT 30.1* 32.2* 32.6*   * 373* 385*   MCV 84 84 83   MCH 26.6* 26.6* 26.8*   MCHC 31.6* 31.7* 32.5     BNP:  Recent Labs   Lab 02/02/20  1511 02/05/20  0300   BNP 3,069* 1,865*     CMP:  Recent Labs   Lab 02/03/20 0317  02/04/20  0300 02/04/20  1302 02/04/20  1937 02/05/20  0300   *  --  133* 167* 158* 128*   CALCIUM 8.5*  --  9.5 9.5 10.0 10.2   ALBUMIN 3.1*  --  3.5  --   --  3.8   PROT 6.7  --  7.5  --   --  8.2   *  --  135* 135* 134* 133*   K 3.9  3.9   < > 3.8  3.8 4.1 4.4 4.0  4.0   CO2 22*  --  23 27 30* 30*   CL 89*  --  96 96 95 89*   BUN 39*  --  38* 35* 37* 35*   CREATININE 1.6*  --  1.6* 1.7* 1.7* 1.6*   ALKPHOS 89  --  103  --   --  119   ALT 22  --  25  --   --  26   AST 24  --  28  --   --  28   BILITOT 1.3*  --  1.4*  --   --  1.4*    < > = values in this interval not  displayed.      Coagulation:   Recent Labs   Lab 02/03/20  0540 02/04/20  0300 02/05/20  0300   APTT 43.3* 42.4* 56.6*     LDH:  No results for input(s): LDH in the last 72 hours.  Microbiology:  Microbiology Results (last 7 days)     Procedure Component Value Units Date/Time    Blood culture [[id number]] Collected:  02/03/20 0946    Order Status:  Completed Specimen:  Blood from Peripheral, Hand, Right Updated:  02/04/20 1022     Blood Culture, Routine No Growth to date      No Growth to date    Blood culture [[id number]] Collected:  02/03/20 0946    Order Status:  Completed Specimen:  Blood from Peripheral, Hand, Right Updated:  02/04/20 1022     Blood Culture, Routine No Growth to date      No Growth to date          I have reviewed all pertinent labs within the past 24 hours.    Estimated Creatinine Clearance: 55.2 mL/min (A) (based on SCr of 1.6 mg/dL (H)).    Diagnostic Results:  I have reviewed and interpreted all pertinent imaging results/findings within the past 24 hours.

## 2020-02-05 NOTE — PROGRESS NOTES
"  [facility]  Adult Nutrition  Consult Note    SUMMARY     Recommendations    1. Add Low sodium diet restrictions to current Regular diet order, continue Boost Glucose Control ONS.  2. RD to monitor & follow-up.    Goals: 1. Pt's intake meals >75% x 7 days.  Nutrition Goal Status: goal met  Communication of RD Recs: reviewed with physician    Reason for Assessment    Reason For Assessment: RD follow-up  Diagnosis: other (see comments)(LVAD/heart Transplant; cardiogenic shock)  Relevant Medical History: nonischemic CMP, chronic HF s/p ICD implant, tobacco and ETOH abuse  Interdisciplinary Rounds: did not attend    General Information Comments: Pt continues w/ varying PO intake, consuming % of meals, drinking 1-2 ONS/day. Planning for LVAD placement tomorrow. NFPE complete 1/23 - pt continues w/ moderate malnutrition.  Nutrition Discharge Planning: Adequate PO intake    Nutrition/Diet History    Patient Reported Diet/Restrictions/Preferences: general  Spiritual, Cultural Beliefs, Anabaptist Practices, Values that Affect Care: no  Factors Affecting Nutritional Intake: decreased appetite    Anthropometrics    Temp: 97.8 °F (36.6 °C)  Height Method: Estimated  Height: 5' 7" (170.2 cm)  Height (inches): 67 in  Weight Method: Bed Scale  Weight: 87.9 kg (193 lb 12.6 oz)  Weight (lb): 193.79 lb  Ideal Body Weight (IBW), Male: 148 lb  % Ideal Body Weight, Male (lb): 130.94 %  BMI (Calculated): 30.3  BMI Grade: 30 - 34.9- obesity - grade I    Lab/Procedures/Meds    Pertinent Labs Reviewed: reviewed  Pertinent Labs Comments: Na 133, BUN 35, Creat 1.6, GFR 55.2  Pertinent Medications Reviewed: reviewed  Pertinent Medications Comments: Dobutamine, Epi, Lasix, Heparin    Estimated/Assessed Needs    Weight Used For Calorie Calculations: 88 kg (194 lb 0.1 oz)     Energy Calorie Requirements (kcal): 2093 kcal/d  Energy Need Method: [place]-St Jeor(PAL 1.25)     Protein Requirements: 106 g/d (1.2 g/kg)  Weight " Used For Protein Calculations: 88 kg (194 lb 0.1 oz)     Fluid Requirements (mL): (Per MD or 1 mL/kcal)    Nutrition Prescription Ordered    Current Diet Order: Regular, Double portions  Nutrition Order Comments: 1000 mL FR  Oral Nutrition Supplement: Boost Glucose Control ONS    Evaluation of Received Nutrient/Fluid Intake    Comments: LBM: 2/3    Tolerance: tolerating    Nutrition Risk    Level of Risk/Frequency of Follow-up: (1x/week)     Assessment and Plan    Moderate malnutrition    Nutrition Problem:  Moderate Protein-Calorie Malnutrition  Malnutrition in the context of Chronic Illness/Injury     Related to (etiology):  Lack of appetite in setting of chronic heart failure.     Signs and Symptoms (as evidenced by):  Energy Intake: <75% of estimated energy requirement for 3+ months  Body Fat Depletion: mild to moderate depletion of orbitals, triceps   Muscle Mass Depletion: moderate depletion of hands, lower extremities        Interventions(treatment strategy):  Collaboration of care with providers.     Nutrition Diagnosis Status:  Continues     Monitor and Evaluation    Food and Nutrient Intake: energy intake, food and beverage intake  Food and Nutrient Adminstration: diet order  Knowledge/Beliefs/Attitudes: food and nutrition knowledge/skill  Anthropometric Measurements: weight, weight change, body mass index  Biochemical Data, Medical Tests and Procedures: electrolyte and renal panel, glucose/endocrine profile, gastrointestinal profile, inflammatory profile, lipid profile  Nutrition-Focused Physical Findings: overall appearance, extremities, muscles and bones, head and eyes, skin     Malnutrition Assessment    Energy Intake (Malnutrition): less than 75% for greater than or equal to 3 months   Orbital Region (Subcutaneous Fat Loss): mild depletion  Upper Arm Region (Subcutaneous Fat Loss): moderate depletion  Thoracic and Lumbar Region: well nourished   Bath Region (Muscle Loss): well nourished  Clavicle  Bone Region (Muscle Loss): well nourished  Clavicle and Acromion Bone Region (Muscle Loss): well nourished  Dorsal Hand (Muscle Loss): moderate depletion  Patellar Region (Muscle Loss): moderate depletion  Anterior Thigh Region (Muscle Loss): moderate depletion  Posterior Calf Region (Muscle Loss): moderate depletion     Nutrition Follow-Up    RD Follow-up?: Yes

## 2020-02-05 NOTE — PROGRESS NOTES
02/05/2020  Tommy Cage    Current provider:  Jeanette Tafoya MD      I, Tommy Cage, rounded on Saba Lott to ensure all mechanical assist device settings (IABP or VAD) were appropriate and all parameters were within limits.  I was able to ensure all back up equipment was present, the staff had no issues, and the Perfusion Department daily rounding was complete.    9:15 AM

## 2020-02-05 NOTE — CARE UPDATE
BG goal 140-180  BG at or below goal ranges with minimal prn SQ insulin requirements. Plans for potential VAD scheduled for 2/6 per primary. Noted Creatinine of 1.6 today.    Continue Low Dose SQ Insulin Correction Scale prn BG excursions  BG monitoring ac/hs    ** Please call Endocrine for any BG related issues **

## 2020-02-06 ENCOUNTER — ANESTHESIA (OUTPATIENT)
Dept: SURGERY | Facility: HOSPITAL | Age: 55
DRG: 001 | End: 2020-02-06
Payer: MEDICARE

## 2020-02-06 PROBLEM — Z95.811 PRESENCE OF LEFT VENTRICULAR ASSIST DEVICE (LVAD): Status: ACTIVE | Noted: 2020-02-06

## 2020-02-06 LAB
ALBUMIN SERPL BCP-MCNC: 3.1 G/DL (ref 3.5–5.2)
ALBUMIN SERPL BCP-MCNC: 3.9 G/DL (ref 3.5–5.2)
ALLENS TEST: ABNORMAL
ALLENS TEST: NORMAL
ALP SERPL-CCNC: 113 U/L (ref 55–135)
ALP SERPL-CCNC: 154 U/L (ref 55–135)
ALT SERPL W/O P-5'-P-CCNC: 22 U/L (ref 10–44)
ALT SERPL W/O P-5'-P-CCNC: 26 U/L (ref 10–44)
ANION GAP SERPL CALC-SCNC: 11 MMOL/L (ref 8–16)
ANION GAP SERPL CALC-SCNC: 15 MMOL/L (ref 8–16)
APTT BLDCRRT: 24.9 SEC (ref 21–32)
APTT BLDCRRT: 25 SEC (ref 21–32)
APTT BLDCRRT: 26.1 SEC (ref 21–32)
APTT BLDCRRT: 27 SEC (ref 21–32)
AST SERPL-CCNC: 25 U/L (ref 10–40)
AST SERPL-CCNC: 72 U/L (ref 10–40)
BASOPHILS # BLD AUTO: 0.03 K/UL (ref 0–0.2)
BASOPHILS # BLD AUTO: 0.04 K/UL (ref 0–0.2)
BASOPHILS # BLD AUTO: 0.05 K/UL (ref 0–0.2)
BASOPHILS # BLD AUTO: 0.05 K/UL (ref 0–0.2)
BASOPHILS NFR BLD: 0.2 % (ref 0–1.9)
BASOPHILS NFR BLD: 0.2 % (ref 0–1.9)
BASOPHILS NFR BLD: 0.4 % (ref 0–1.9)
BASOPHILS NFR BLD: 0.4 % (ref 0–1.9)
BILIRUB DIRECT SERPL-MCNC: 1.1 MG/DL (ref 0.1–0.3)
BILIRUB SERPL-MCNC: 1.3 MG/DL (ref 0.1–1)
BILIRUB SERPL-MCNC: 1.8 MG/DL (ref 0.1–1)
BNP SERPL-MCNC: 1420 PG/ML (ref 0–99)
BUN SERPL-MCNC: 38 MG/DL (ref 6–20)
BUN SERPL-MCNC: 41 MG/DL (ref 6–20)
BUN SERPL-MCNC: 47 MG/DL (ref 6–20)
BUN SERPL-MCNC: 47 MG/DL (ref 6–20)
CALCIUM SERPL-MCNC: 10.7 MG/DL (ref 8.7–10.5)
CALCIUM SERPL-MCNC: 10.8 MG/DL (ref 8.7–10.5)
CALCIUM SERPL-MCNC: 10.8 MG/DL (ref 8.7–10.5)
CALCIUM SERPL-MCNC: 11.2 MG/DL (ref 8.7–10.5)
CHLORIDE SERPL-SCNC: 85 MMOL/L (ref 95–110)
CHLORIDE SERPL-SCNC: 85 MMOL/L (ref 95–110)
CHLORIDE SERPL-SCNC: 93 MMOL/L (ref 95–110)
CHLORIDE SERPL-SCNC: 97 MMOL/L (ref 95–110)
CO2 SERPL-SCNC: 26 MMOL/L (ref 23–29)
CO2 SERPL-SCNC: 28 MMOL/L (ref 23–29)
CO2 SERPL-SCNC: 33 MMOL/L (ref 23–29)
CO2 SERPL-SCNC: 33 MMOL/L (ref 23–29)
CREAT SERPL-MCNC: 1.5 MG/DL (ref 0.5–1.4)
CREAT SERPL-MCNC: 1.5 MG/DL (ref 0.5–1.4)
CREAT SERPL-MCNC: 1.7 MG/DL (ref 0.5–1.4)
CREAT SERPL-MCNC: 1.7 MG/DL (ref 0.5–1.4)
DELSYS: ABNORMAL
DELSYS: ABNORMAL
DIFFERENTIAL METHOD: ABNORMAL
EOSINOPHIL # BLD AUTO: 0.1 K/UL (ref 0–0.5)
EOSINOPHIL # BLD AUTO: 0.1 K/UL (ref 0–0.5)
EOSINOPHIL # BLD AUTO: 0.2 K/UL (ref 0–0.5)
EOSINOPHIL # BLD AUTO: 0.3 K/UL (ref 0–0.5)
EOSINOPHIL NFR BLD: 1 % (ref 0–8)
EOSINOPHIL NFR BLD: 1 % (ref 0–8)
EOSINOPHIL NFR BLD: 1.4 % (ref 0–8)
EOSINOPHIL NFR BLD: 1.7 % (ref 0–8)
ERYTHROCYTE [DISTWIDTH] IN BLOOD BY AUTOMATED COUNT: 18.1 % (ref 11.5–14.5)
ERYTHROCYTE [DISTWIDTH] IN BLOOD BY AUTOMATED COUNT: 18.2 % (ref 11.5–14.5)
ERYTHROCYTE [DISTWIDTH] IN BLOOD BY AUTOMATED COUNT: 18.3 % (ref 11.5–14.5)
ERYTHROCYTE [DISTWIDTH] IN BLOOD BY AUTOMATED COUNT: 18.4 % (ref 11.5–14.5)
ERYTHROCYTE [SEDIMENTATION RATE] IN BLOOD BY WESTERGREN METHOD: 14 MM/H
EST. GFR  (AFRICAN AMERICAN): 51.3 ML/MIN/1.73 M^2
EST. GFR  (AFRICAN AMERICAN): 51.3 ML/MIN/1.73 M^2
EST. GFR  (AFRICAN AMERICAN): 59.7 ML/MIN/1.73 M^2
EST. GFR  (AFRICAN AMERICAN): 59.7 ML/MIN/1.73 M^2
EST. GFR  (NON AFRICAN AMERICAN): 44.4 ML/MIN/1.73 M^2
EST. GFR  (NON AFRICAN AMERICAN): 44.4 ML/MIN/1.73 M^2
EST. GFR  (NON AFRICAN AMERICAN): 51.7 ML/MIN/1.73 M^2
EST. GFR  (NON AFRICAN AMERICAN): 51.7 ML/MIN/1.73 M^2
FIBRINOGEN PPP-MCNC: 385 MG/DL (ref 182–366)
FIBRINOGEN PPP-MCNC: 529 MG/DL (ref 182–366)
FIO2: 50
FIO2: 70
FIO2: 70
FIO2: 80
GLUCOSE SERPL-MCNC: 117 MG/DL (ref 70–110)
GLUCOSE SERPL-MCNC: 117 MG/DL (ref 70–110)
GLUCOSE SERPL-MCNC: 125 MG/DL (ref 70–110)
GLUCOSE SERPL-MCNC: 158 MG/DL (ref 70–110)
GLUCOSE SERPL-MCNC: 172 MG/DL (ref 70–110)
GLUCOSE SERPL-MCNC: 177 MG/DL (ref 70–110)
GLUCOSE SERPL-MCNC: 197 MG/DL (ref 70–110)
HCO3 UR-SCNC: 29.6 MMOL/L (ref 24–28)
HCO3 UR-SCNC: 30.1 MMOL/L (ref 24–28)
HCO3 UR-SCNC: 32.9 MMOL/L (ref 24–28)
HCO3 UR-SCNC: 33.1 MMOL/L (ref 24–28)
HCO3 UR-SCNC: 33.3 MMOL/L (ref 24–28)
HCO3 UR-SCNC: 33.5 MMOL/L (ref 24–28)
HCO3 UR-SCNC: 34.2 MMOL/L (ref 24–28)
HCO3 UR-SCNC: 34.3 MMOL/L (ref 24–28)
HCO3 UR-SCNC: 35.5 MMOL/L (ref 24–28)
HCO3 UR-SCNC: 36.5 MMOL/L (ref 24–28)
HCO3 UR-SCNC: 38.8 MMOL/L (ref 24–28)
HCT VFR BLD AUTO: 30.9 % (ref 40–54)
HCT VFR BLD AUTO: 31 % (ref 40–54)
HCT VFR BLD AUTO: 32.3 % (ref 40–54)
HCT VFR BLD AUTO: 37.5 % (ref 40–54)
HCT VFR BLD CALC: 32 %PCV (ref 36–54)
HCT VFR BLD CALC: 32 %PCV (ref 36–54)
HCT VFR BLD CALC: 34 %PCV (ref 36–54)
HCT VFR BLD CALC: 34 %PCV (ref 36–54)
HCT VFR BLD CALC: 37 %PCV (ref 36–54)
HGB BLD-MCNC: 10.2 G/DL (ref 14–18)
HGB BLD-MCNC: 10.4 G/DL (ref 14–18)
HGB BLD-MCNC: 10.4 G/DL (ref 14–18)
HGB BLD-MCNC: 12.3 G/DL (ref 14–18)
IMM GRANULOCYTES # BLD AUTO: 0.11 K/UL (ref 0–0.04)
IMM GRANULOCYTES # BLD AUTO: 0.21 K/UL (ref 0–0.04)
IMM GRANULOCYTES # BLD AUTO: 0.25 K/UL (ref 0–0.04)
IMM GRANULOCYTES # BLD AUTO: 0.93 K/UL (ref 0–0.04)
IMM GRANULOCYTES NFR BLD AUTO: 0.8 % (ref 0–0.5)
IMM GRANULOCYTES NFR BLD AUTO: 1.5 % (ref 0–0.5)
IMM GRANULOCYTES NFR BLD AUTO: 1.8 % (ref 0–0.5)
IMM GRANULOCYTES NFR BLD AUTO: 4.2 % (ref 0–0.5)
INR PPP: 1.3 (ref 0.8–1.2)
INR PPP: 1.3 (ref 0.8–1.2)
INR PPP: 1.4 (ref 0.8–1.2)
INR PPP: 1.5 (ref 0.8–1.2)
LDH SERPL L TO P-CCNC: 1 MMOL/L (ref 0.36–1.25)
LDH SERPL L TO P-CCNC: 1.05 MMOL/L (ref 0.36–1.25)
LDH SERPL L TO P-CCNC: 1.2 MMOL/L (ref 0.5–2.2)
LDH SERPL L TO P-CCNC: 1.38 MMOL/L (ref 0.36–1.25)
LDH SERPL L TO P-CCNC: 1.58 MMOL/L (ref 0.36–1.25)
LDH SERPL L TO P-CCNC: 599 U/L (ref 110–260)
LYMPHOCYTES # BLD AUTO: 0.8 K/UL (ref 1–4.8)
LYMPHOCYTES # BLD AUTO: 0.9 K/UL (ref 1–4.8)
LYMPHOCYTES # BLD AUTO: 1.6 K/UL (ref 1–4.8)
LYMPHOCYTES # BLD AUTO: 1.6 K/UL (ref 1–4.8)
LYMPHOCYTES NFR BLD: 11.4 % (ref 18–48)
LYMPHOCYTES NFR BLD: 6.1 % (ref 18–48)
LYMPHOCYTES NFR BLD: 6.5 % (ref 18–48)
LYMPHOCYTES NFR BLD: 7.2 % (ref 18–48)
MAGNESIUM SERPL-MCNC: 2.4 MG/DL (ref 1.6–2.6)
MCH RBC QN AUTO: 26.5 PG (ref 27–31)
MCH RBC QN AUTO: 26.6 PG (ref 27–31)
MCH RBC QN AUTO: 26.8 PG (ref 27–31)
MCH RBC QN AUTO: 27 PG (ref 27–31)
MCHC RBC AUTO-ENTMCNC: 32.2 G/DL (ref 32–36)
MCHC RBC AUTO-ENTMCNC: 32.8 G/DL (ref 32–36)
MCHC RBC AUTO-ENTMCNC: 33 G/DL (ref 32–36)
MCHC RBC AUTO-ENTMCNC: 33.5 G/DL (ref 32–36)
MCV RBC AUTO: 81 FL (ref 82–98)
MCV RBC AUTO: 81 FL (ref 82–98)
MCV RBC AUTO: 82 FL (ref 82–98)
MCV RBC AUTO: 82 FL (ref 82–98)
METHEMOGLOBIN: 0.4 % (ref 0–3)
MODE: ABNORMAL
MODE: ABNORMAL
MONOCYTES # BLD AUTO: 0.6 K/UL (ref 0.3–1)
MONOCYTES # BLD AUTO: 0.6 K/UL (ref 0.3–1)
MONOCYTES # BLD AUTO: 0.8 K/UL (ref 0.3–1)
MONOCYTES # BLD AUTO: 1 K/UL (ref 0.3–1)
MONOCYTES NFR BLD: 2.7 % (ref 4–15)
MONOCYTES NFR BLD: 4.6 % (ref 4–15)
MONOCYTES NFR BLD: 5.8 % (ref 4–15)
MONOCYTES NFR BLD: 7 % (ref 4–15)
NEUTROPHILS # BLD AUTO: 10.7 K/UL (ref 1.8–7.7)
NEUTROPHILS # BLD AUTO: 11.7 K/UL (ref 1.8–7.7)
NEUTROPHILS # BLD AUTO: 12 K/UL (ref 1.8–7.7)
NEUTROPHILS # BLD AUTO: 18.5 K/UL (ref 1.8–7.7)
NEUTROPHILS NFR BLD: 78.7 % (ref 38–73)
NEUTROPHILS NFR BLD: 84.3 % (ref 38–73)
NEUTROPHILS NFR BLD: 85.2 % (ref 38–73)
NEUTROPHILS NFR BLD: 85.9 % (ref 38–73)
NRBC BLD-RTO: 0 /100 WBC
PCO2 BLDA: 33.4 MMHG (ref 35–45)
PCO2 BLDA: 37.3 MMHG (ref 35–45)
PCO2 BLDA: 39.4 MMHG (ref 35–45)
PCO2 BLDA: 40 MMHG (ref 35–45)
PCO2 BLDA: 40.3 MMHG (ref 35–45)
PCO2 BLDA: 42 MMHG (ref 35–45)
PCO2 BLDA: 43 MMHG (ref 35–45)
PCO2 BLDA: 45.9 MMHG (ref 35–45)
PCO2 BLDA: 46.2 MMHG (ref 35–45)
PCO2 BLDA: 46.2 MMHG (ref 35–45)
PCO2 BLDA: 53.9 MMHG (ref 35–45)
PEEP: 5
PH SMN: 7.46 [PH] (ref 7.35–7.45)
PH SMN: 7.48 [PH] (ref 7.35–7.45)
PH SMN: 7.48 [PH] (ref 7.35–7.45)
PH SMN: 7.49 [PH] (ref 7.35–7.45)
PH SMN: 7.49 [PH] (ref 7.35–7.45)
PH SMN: 7.51 [PH] (ref 7.35–7.45)
PH SMN: 7.51 [PH] (ref 7.35–7.45)
PH SMN: 7.52 [PH] (ref 7.35–7.45)
PH SMN: 7.53 [PH] (ref 7.35–7.45)
PH SMN: 7.54 [PH] (ref 7.35–7.45)
PH SMN: 7.6 [PH] (ref 7.35–7.45)
PHOSPHATE SERPL-MCNC: 4.4 MG/DL (ref 2.7–4.5)
PHOSPHATE SERPL-MCNC: 4.7 MG/DL (ref 2.7–4.5)
PLATELET # BLD AUTO: 223 K/UL (ref 150–350)
PLATELET # BLD AUTO: 227 K/UL (ref 150–350)
PLATELET # BLD AUTO: 237 K/UL (ref 150–350)
PLATELET # BLD AUTO: 426 K/UL (ref 150–350)
PMV BLD AUTO: 8.9 FL (ref 9.2–12.9)
PMV BLD AUTO: 9 FL (ref 9.2–12.9)
PMV BLD AUTO: 9.7 FL (ref 9.2–12.9)
PMV BLD AUTO: 9.8 FL (ref 9.2–12.9)
PO2 BLDA: 167 MMHG (ref 80–100)
PO2 BLDA: 212 MMHG (ref 80–100)
PO2 BLDA: 250 MMHG (ref 80–100)
PO2 BLDA: 264 MMHG (ref 80–100)
PO2 BLDA: 268 MMHG (ref 80–100)
PO2 BLDA: 272 MMHG (ref 80–100)
PO2 BLDA: 296 MMHG (ref 80–100)
PO2 BLDA: 30 MMHG (ref 40–60)
PO2 BLDA: 33 MMHG (ref 40–60)
PO2 BLDA: 377 MMHG (ref 80–100)
PO2 BLDA: 43 MMHG (ref 40–60)
POC BE: 10 MMOL/L
POC BE: 11 MMOL/L
POC BE: 12 MMOL/L
POC BE: 13 MMOL/L
POC BE: 15 MMOL/L
POC BE: 7 MMOL/L
POC BE: 7 MMOL/L
POC IONIZED CALCIUM: 0.97 MMOL/L (ref 1.06–1.42)
POC IONIZED CALCIUM: 1.05 MMOL/L (ref 1.06–1.42)
POC IONIZED CALCIUM: 1.22 MMOL/L (ref 1.06–1.42)
POC IONIZED CALCIUM: 1.32 MMOL/L (ref 1.06–1.42)
POC IONIZED CALCIUM: 1.37 MMOL/L (ref 1.06–1.42)
POC SATURATED O2: 100 % (ref 95–100)
POC SATURATED O2: 60 % (ref 95–100)
POC SATURATED O2: 67 % (ref 95–100)
POC SATURATED O2: 82 % (ref 95–100)
POC TCO2: 31 MMOL/L (ref 23–27)
POC TCO2: 31 MMOL/L (ref 23–27)
POC TCO2: 34 MMOL/L (ref 23–27)
POC TCO2: 34 MMOL/L (ref 24–29)
POC TCO2: 35 MMOL/L (ref 23–27)
POC TCO2: 35 MMOL/L (ref 23–27)
POC TCO2: 36 MMOL/L (ref 23–27)
POC TCO2: 36 MMOL/L (ref 24–29)
POC TCO2: 37 MMOL/L (ref 23–27)
POC TCO2: 38 MMOL/L (ref 23–27)
POC TCO2: 40 MMOL/L (ref 24–29)
POCT GLUCOSE: 110 MG/DL (ref 70–110)
POCT GLUCOSE: 112 MG/DL (ref 70–110)
POCT GLUCOSE: 118 MG/DL (ref 70–110)
POCT GLUCOSE: 136 MG/DL (ref 70–110)
POCT GLUCOSE: 137 MG/DL (ref 70–110)
POCT GLUCOSE: 138 MG/DL (ref 70–110)
POCT GLUCOSE: 143 MG/DL (ref 70–110)
POCT GLUCOSE: 150 MG/DL (ref 70–110)
POCT GLUCOSE: 154 MG/DL (ref 70–110)
POCT GLUCOSE: 156 MG/DL (ref 70–110)
POCT GLUCOSE: 166 MG/DL (ref 70–110)
POCT GLUCOSE: 167 MG/DL (ref 70–110)
POTASSIUM BLD-SCNC: 3.2 MMOL/L (ref 3.5–5.1)
POTASSIUM BLD-SCNC: 3.2 MMOL/L (ref 3.5–5.1)
POTASSIUM BLD-SCNC: 3.3 MMOL/L (ref 3.5–5.1)
POTASSIUM BLD-SCNC: 3.5 MMOL/L (ref 3.5–5.1)
POTASSIUM BLD-SCNC: 4.2 MMOL/L (ref 3.5–5.1)
POTASSIUM SERPL-SCNC: 3.4 MMOL/L (ref 3.5–5.1)
POTASSIUM SERPL-SCNC: 4 MMOL/L (ref 3.5–5.1)
POTASSIUM SERPL-SCNC: 4.1 MMOL/L (ref 3.5–5.1)
POTASSIUM SERPL-SCNC: 4.8 MMOL/L (ref 3.5–5.1)
PROT SERPL-MCNC: 7.3 G/DL (ref 6–8.4)
PROT SERPL-MCNC: 9.4 G/DL (ref 6–8.4)
PROTHROMBIN TIME: 12.6 SEC (ref 9–12.5)
PROTHROMBIN TIME: 13.2 SEC (ref 9–12.5)
PROTHROMBIN TIME: 13.8 SEC (ref 9–12.5)
PROTHROMBIN TIME: 14.4 SEC (ref 9–12.5)
RBC # BLD AUTO: 3.83 M/UL (ref 4.6–6.2)
RBC # BLD AUTO: 3.85 M/UL (ref 4.6–6.2)
RBC # BLD AUTO: 3.92 M/UL (ref 4.6–6.2)
RBC # BLD AUTO: 4.59 M/UL (ref 4.6–6.2)
SAMPLE: ABNORMAL
SAMPLE: NORMAL
SITE: ABNORMAL
SITE: NORMAL
SODIUM BLD-SCNC: 128 MMOL/L (ref 136–145)
SODIUM BLD-SCNC: 129 MMOL/L (ref 136–145)
SODIUM BLD-SCNC: 129 MMOL/L (ref 136–145)
SODIUM BLD-SCNC: 132 MMOL/L (ref 136–145)
SODIUM BLD-SCNC: 133 MMOL/L (ref 136–145)
SODIUM SERPL-SCNC: 133 MMOL/L (ref 136–145)
SODIUM SERPL-SCNC: 133 MMOL/L (ref 136–145)
SODIUM SERPL-SCNC: 134 MMOL/L (ref 136–145)
SODIUM SERPL-SCNC: 136 MMOL/L (ref 136–145)
SP02: 100
VT: 450
WBC # BLD AUTO: 13.64 K/UL (ref 3.9–12.7)
WBC # BLD AUTO: 13.73 K/UL (ref 3.9–12.7)
WBC # BLD AUTO: 13.92 K/UL (ref 3.9–12.7)
WBC # BLD AUTO: 21.9 K/UL (ref 3.9–12.7)

## 2020-02-06 PROCEDURE — 83615 LACTATE (LD) (LDH) ENZYME: CPT

## 2020-02-06 PROCEDURE — 33425 PR MITRALPLASTY W CP BYPASS: ICD-10-PCS | Mod: ,,, | Performed by: THORACIC SURGERY (CARDIOTHORACIC VASCULAR SURGERY)

## 2020-02-06 PROCEDURE — 63600367 HC NITRIC OXIDE PER HOUR

## 2020-02-06 PROCEDURE — 37799 UNLISTED PX VASCULAR SURGERY: CPT

## 2020-02-06 PROCEDURE — 99291 CRITICAL CARE FIRST HOUR: CPT | Mod: GC,,, | Performed by: SURGERY

## 2020-02-06 PROCEDURE — 99291 PR CRITICAL CARE, E/M 30-74 MINUTES: ICD-10-PCS | Mod: GC,,, | Performed by: SURGERY

## 2020-02-06 PROCEDURE — 36556 PR INSERT NON-TUNNEL CV CATH 5+ YRS OLD: ICD-10-PCS | Mod: 59,,, | Performed by: ANESTHESIOLOGY

## 2020-02-06 PROCEDURE — 63600175 PHARM REV CODE 636 W HCPCS: Performed by: SURGERY

## 2020-02-06 PROCEDURE — 36620 ARTERIAL: ICD-10-PCS | Mod: 59,,, | Performed by: ANESTHESIOLOGY

## 2020-02-06 PROCEDURE — 27000221 HC OXYGEN, UP TO 24 HOURS

## 2020-02-06 PROCEDURE — 63600175 PHARM REV CODE 636 W HCPCS: Performed by: STUDENT IN AN ORGANIZED HEALTH CARE EDUCATION/TRAINING PROGRAM

## 2020-02-06 PROCEDURE — 82330 ASSAY OF CALCIUM: CPT

## 2020-02-06 PROCEDURE — 93503 SWAN GANZ LINE: ICD-10-PCS | Mod: 59,,, | Performed by: ANESTHESIOLOGY

## 2020-02-06 PROCEDURE — 27100021 HC MULTIPORT INFUSION MANIFOLD: Performed by: ANESTHESIOLOGY

## 2020-02-06 PROCEDURE — 85730 THROMBOPLASTIN TIME PARTIAL: CPT | Mod: 91

## 2020-02-06 PROCEDURE — 80048 BASIC METABOLIC PNL TOTAL CA: CPT | Mod: 91

## 2020-02-06 PROCEDURE — 93503 INSERT/PLACE HEART CATHETER: CPT | Mod: 59,,, | Performed by: ANESTHESIOLOGY

## 2020-02-06 PROCEDURE — 33979 INSERT INTRACORPOREAL DEVICE: CPT | Mod: 51,,, | Performed by: THORACIC SURGERY (CARDIOTHORACIC VASCULAR SURGERY)

## 2020-02-06 PROCEDURE — 93010 EKG 12-LEAD: ICD-10-PCS | Mod: ,,, | Performed by: INTERNAL MEDICINE

## 2020-02-06 PROCEDURE — 83050 HGB METHEMOGLOBIN QUAN: CPT

## 2020-02-06 PROCEDURE — 63600175 PHARM REV CODE 636 W HCPCS: Performed by: NURSE PRACTITIONER

## 2020-02-06 PROCEDURE — 80053 COMPREHEN METABOLIC PANEL: CPT

## 2020-02-06 PROCEDURE — 25000003 PHARM REV CODE 250: Performed by: ANESTHESIOLOGY

## 2020-02-06 PROCEDURE — 93005 ELECTROCARDIOGRAM TRACING: CPT

## 2020-02-06 PROCEDURE — 27201041 HC RESERVOIR, CARDIOTOMY

## 2020-02-06 PROCEDURE — 99900026 HC AIRWAY MAINTENANCE (STAT)

## 2020-02-06 PROCEDURE — 36000712 HC OR TIME LEV V 1ST 15 MIN: Performed by: THORACIC SURGERY (CARDIOTHORACIC VASCULAR SURGERY)

## 2020-02-06 PROCEDURE — 85014 HEMATOCRIT: CPT

## 2020-02-06 PROCEDURE — 27201037 HC PRESSURE MONITORING SET UP

## 2020-02-06 PROCEDURE — 94002 VENT MGMT INPAT INIT DAY: CPT

## 2020-02-06 PROCEDURE — 85730 THROMBOPLASTIN TIME PARTIAL: CPT

## 2020-02-06 PROCEDURE — 84100 ASSAY OF PHOSPHORUS: CPT | Mod: 91

## 2020-02-06 PROCEDURE — 84132 ASSAY OF SERUM POTASSIUM: CPT

## 2020-02-06 PROCEDURE — 83735 ASSAY OF MAGNESIUM: CPT | Mod: 91

## 2020-02-06 PROCEDURE — 83605 ASSAY OF LACTIC ACID: CPT

## 2020-02-06 PROCEDURE — 85520 HEPARIN ASSAY: CPT

## 2020-02-06 PROCEDURE — 27801475 HC HEARTMATE III IMPLANT KIT

## 2020-02-06 PROCEDURE — 84295 ASSAY OF SERUM SODIUM: CPT

## 2020-02-06 PROCEDURE — C1751 CATH, INF, PER/CENT/MIDLINE: HCPCS | Performed by: ANESTHESIOLOGY

## 2020-02-06 PROCEDURE — 36620 INSERTION CATHETER ARTERY: CPT | Mod: 59,,, | Performed by: ANESTHESIOLOGY

## 2020-02-06 PROCEDURE — 99233 SBSQ HOSP IP/OBS HIGH 50: CPT | Mod: ICN,,, | Performed by: NURSE PRACTITIONER

## 2020-02-06 PROCEDURE — 82803 BLOOD GASES ANY COMBINATION: CPT

## 2020-02-06 PROCEDURE — 27201423 OPTIME MED/SURG SUP & DEVICES STERILE SUPPLY: Performed by: THORACIC SURGERY (CARDIOTHORACIC VASCULAR SURGERY)

## 2020-02-06 PROCEDURE — 33979 PR INSERT VENT ASST DEV,IMPLANT,SINGLE VENT: ICD-10-PCS | Mod: 51,,, | Performed by: THORACIC SURGERY (CARDIOTHORACIC VASCULAR SURGERY)

## 2020-02-06 PROCEDURE — 88305 TISSUE EXAM BY PATHOLOGIST: CPT | Performed by: PATHOLOGY

## 2020-02-06 PROCEDURE — 27200675 HC TRANSDUCER MONITOR KIT 4 LINES: Performed by: ANESTHESIOLOGY

## 2020-02-06 PROCEDURE — 80048 BASIC METABOLIC PNL TOTAL CA: CPT

## 2020-02-06 PROCEDURE — 33425 REPAIR OF MITRAL VALVE: CPT | Mod: ,,, | Performed by: THORACIC SURGERY (CARDIOTHORACIC VASCULAR SURGERY)

## 2020-02-06 PROCEDURE — 63600175 PHARM REV CODE 636 W HCPCS: Performed by: ANESTHESIOLOGY

## 2020-02-06 PROCEDURE — 25000003 PHARM REV CODE 250: Performed by: STUDENT IN AN ORGANIZED HEALTH CARE EDUCATION/TRAINING PROGRAM

## 2020-02-06 PROCEDURE — 63600175 PHARM REV CODE 636 W HCPCS: Mod: JG

## 2020-02-06 PROCEDURE — P9045 ALBUMIN (HUMAN), 5%, 250 ML: HCPCS | Mod: JG

## 2020-02-06 PROCEDURE — 27000191 HC C-V MONITORING

## 2020-02-06 PROCEDURE — 76937 SWAN GANZ LINE: ICD-10-PCS | Mod: 26,,, | Performed by: ANESTHESIOLOGY

## 2020-02-06 PROCEDURE — 36556 INSERT NON-TUNNEL CV CATH: CPT | Mod: 59,,, | Performed by: ANESTHESIOLOGY

## 2020-02-06 PROCEDURE — D9220A PRA ANESTHESIA: ICD-10-PCS | Mod: ,,, | Performed by: ANESTHESIOLOGY

## 2020-02-06 PROCEDURE — 85610 PROTHROMBIN TIME: CPT | Mod: 91

## 2020-02-06 PROCEDURE — 93312 ECHO TRANSESOPHAGEAL: CPT | Mod: 26,59,, | Performed by: ANESTHESIOLOGY

## 2020-02-06 PROCEDURE — 99900035 HC TECH TIME PER 15 MIN (STAT)

## 2020-02-06 PROCEDURE — D9220A PRA ANESTHESIA: Mod: ,,, | Performed by: ANESTHESIOLOGY

## 2020-02-06 PROCEDURE — 25000003 PHARM REV CODE 250: Performed by: THORACIC SURGERY (CARDIOTHORACIC VASCULAR SURGERY)

## 2020-02-06 PROCEDURE — 93010 ELECTROCARDIOGRAM REPORT: CPT | Mod: ,,, | Performed by: INTERNAL MEDICINE

## 2020-02-06 PROCEDURE — 85025 COMPLETE CBC W/AUTO DIFF WBC: CPT | Mod: 91

## 2020-02-06 PROCEDURE — 27000202 HC IABP, ADD'L DAY

## 2020-02-06 PROCEDURE — 25000003 PHARM REV CODE 250

## 2020-02-06 PROCEDURE — 36592 COLLECT BLOOD FROM PICC: CPT

## 2020-02-06 PROCEDURE — 93312 TEE: ICD-10-PCS | Mod: 26,59,, | Performed by: ANESTHESIOLOGY

## 2020-02-06 PROCEDURE — 85384 FIBRINOGEN ACTIVITY: CPT

## 2020-02-06 PROCEDURE — 83880 ASSAY OF NATRIURETIC PEPTIDE: CPT

## 2020-02-06 PROCEDURE — 27100025 HC TUBING, SET FLUID WARMER: Performed by: ANESTHESIOLOGY

## 2020-02-06 PROCEDURE — 85384 FIBRINOGEN ACTIVITY: CPT | Mod: 91

## 2020-02-06 PROCEDURE — 27201015 HC HEMO-CONCENTRATOR

## 2020-02-06 PROCEDURE — C1751 CATH, INF, PER/CENT/MIDLINE: HCPCS

## 2020-02-06 PROCEDURE — 88305 TISSUE EXAM BY PATHOLOGIST: CPT | Mod: 26,,, | Performed by: PATHOLOGY

## 2020-02-06 PROCEDURE — 25000003 PHARM REV CODE 250: Performed by: SURGERY

## 2020-02-06 PROCEDURE — 37000008 HC ANESTHESIA 1ST 15 MINUTES: Performed by: THORACIC SURGERY (CARDIOTHORACIC VASCULAR SURGERY)

## 2020-02-06 PROCEDURE — 88305 TISSUE EXAM BY PATHOLOGIST: ICD-10-PCS | Mod: 26,,, | Performed by: PATHOLOGY

## 2020-02-06 PROCEDURE — 83735 ASSAY OF MAGNESIUM: CPT

## 2020-02-06 PROCEDURE — 27000175 HC ADULT BYPASS PUMP

## 2020-02-06 PROCEDURE — 80076 HEPATIC FUNCTION PANEL: CPT

## 2020-02-06 PROCEDURE — 20000000 HC ICU ROOM

## 2020-02-06 PROCEDURE — 94003 VENT MGMT INPAT SUBQ DAY: CPT

## 2020-02-06 PROCEDURE — 99233 PR SUBSEQUENT HOSPITAL CARE,LEVL III: ICD-10-PCS | Mod: ICN,,, | Performed by: NURSE PRACTITIONER

## 2020-02-06 PROCEDURE — 94761 N-INVAS EAR/PLS OXIMETRY MLT: CPT

## 2020-02-06 PROCEDURE — 36000713 HC OR TIME LEV V EA ADD 15 MIN: Performed by: THORACIC SURGERY (CARDIOTHORACIC VASCULAR SURGERY)

## 2020-02-06 PROCEDURE — 76937 US GUIDE VASCULAR ACCESS: CPT | Mod: 26,,, | Performed by: ANESTHESIOLOGY

## 2020-02-06 PROCEDURE — 85610 PROTHROMBIN TIME: CPT

## 2020-02-06 PROCEDURE — C1729 CATH, DRAINAGE: HCPCS | Performed by: THORACIC SURGERY (CARDIOTHORACIC VASCULAR SURGERY)

## 2020-02-06 PROCEDURE — 37000009 HC ANESTHESIA EA ADD 15 MINS: Performed by: THORACIC SURGERY (CARDIOTHORACIC VASCULAR SURGERY)

## 2020-02-06 DEVICE — IMPLANTABLE DEVICE: Type: IMPLANTABLE DEVICE | Site: CHEST | Status: FUNCTIONAL

## 2020-02-06 RX ORDER — MAGNESIUM SULFATE HEPTAHYDRATE 40 MG/ML
2 INJECTION, SOLUTION INTRAVENOUS
Status: DISCONTINUED | OUTPATIENT
Start: 2020-02-06 | End: 2020-02-07

## 2020-02-06 RX ORDER — POTASSIUM CHLORIDE 14.9 MG/ML
INJECTION INTRAVENOUS CONTINUOUS PRN
Status: DISCONTINUED | OUTPATIENT
Start: 2020-02-06 | End: 2020-02-06

## 2020-02-06 RX ORDER — KETAMINE HCL IN 0.9 % NACL 50 MG/5 ML
SYRINGE (ML) INTRAVENOUS
Status: DISCONTINUED | OUTPATIENT
Start: 2020-02-06 | End: 2020-02-06

## 2020-02-06 RX ORDER — DOBUTAMINE HYDROCHLORIDE 400 MG/100ML
2.5 INJECTION, SOLUTION INTRAVENOUS CONTINUOUS
Status: DISCONTINUED | OUTPATIENT
Start: 2020-02-06 | End: 2020-02-25

## 2020-02-06 RX ORDER — POTASSIUM CHLORIDE 29.8 MG/ML
40 INJECTION INTRAVENOUS
Status: DISCONTINUED | OUTPATIENT
Start: 2020-02-06 | End: 2020-02-07

## 2020-02-06 RX ORDER — POTASSIUM CHLORIDE 14.9 MG/ML
20 INJECTION INTRAVENOUS
Status: DISCONTINUED | OUTPATIENT
Start: 2020-02-06 | End: 2020-02-06

## 2020-02-06 RX ORDER — ALBUMIN HUMAN 50 G/1000ML
SOLUTION INTRAVENOUS
Status: COMPLETED
Start: 2020-02-06 | End: 2020-02-06

## 2020-02-06 RX ORDER — ALBUMIN HUMAN 50 G/1000ML
25 SOLUTION INTRAVENOUS ONCE
Status: DISCONTINUED | OUTPATIENT
Start: 2020-02-06 | End: 2020-02-10

## 2020-02-06 RX ORDER — ONDANSETRON 2 MG/ML
INJECTION INTRAMUSCULAR; INTRAVENOUS
Status: DISCONTINUED | OUTPATIENT
Start: 2020-02-06 | End: 2020-02-06

## 2020-02-06 RX ORDER — TRANEXAMIC ACID 100 MG/ML
INJECTION, SOLUTION INTRAVENOUS CONTINUOUS PRN
Status: DISCONTINUED | OUTPATIENT
Start: 2020-02-06 | End: 2020-02-06

## 2020-02-06 RX ORDER — NICARDIPINE HYDROCHLORIDE 0.2 MG/ML
1 INJECTION INTRAVENOUS CONTINUOUS
Status: DISCONTINUED | OUTPATIENT
Start: 2020-02-06 | End: 2020-02-10

## 2020-02-06 RX ORDER — PROTAMINE SULFATE 10 MG/ML
INJECTION, SOLUTION INTRAVENOUS
Status: DISCONTINUED | OUTPATIENT
Start: 2020-02-06 | End: 2020-02-06

## 2020-02-06 RX ORDER — PANTOPRAZOLE SODIUM 40 MG/10ML
40 INJECTION, POWDER, LYOPHILIZED, FOR SOLUTION INTRAVENOUS DAILY
Status: DISCONTINUED | OUTPATIENT
Start: 2020-02-07 | End: 2020-02-13

## 2020-02-06 RX ORDER — MORPHINE SULFATE 2 MG/ML
2 INJECTION, SOLUTION INTRAMUSCULAR; INTRAVENOUS
Status: DISCONTINUED | OUTPATIENT
Start: 2020-02-06 | End: 2020-02-07

## 2020-02-06 RX ORDER — MAGNESIUM SULFATE HEPTAHYDRATE 40 MG/ML
4 INJECTION, SOLUTION INTRAVENOUS
Status: DISCONTINUED | OUTPATIENT
Start: 2020-02-06 | End: 2020-02-06

## 2020-02-06 RX ORDER — PROPOFOL 10 MG/ML
5 INJECTION, EMULSION INTRAVENOUS CONTINUOUS
Status: DISCONTINUED | OUTPATIENT
Start: 2020-02-06 | End: 2020-02-10

## 2020-02-06 RX ORDER — PROPOFOL 10 MG/ML
VIAL (ML) INTRAVENOUS CONTINUOUS PRN
Status: DISCONTINUED | OUTPATIENT
Start: 2020-02-06 | End: 2020-02-06

## 2020-02-06 RX ORDER — NOREPINEPHRINE BITARTRATE 1 MG/ML
INJECTION, SOLUTION INTRAVENOUS
Status: DISCONTINUED | OUTPATIENT
Start: 2020-02-06 | End: 2020-02-06

## 2020-02-06 RX ORDER — FENTANYL CITRATE 50 UG/ML
INJECTION, SOLUTION INTRAMUSCULAR; INTRAVENOUS
Status: DISCONTINUED | OUTPATIENT
Start: 2020-02-06 | End: 2020-02-06

## 2020-02-06 RX ORDER — POTASSIUM CHLORIDE 29.8 MG/ML
40 INJECTION INTRAVENOUS
Status: DISCONTINUED | OUTPATIENT
Start: 2020-02-06 | End: 2020-02-06

## 2020-02-06 RX ORDER — LIDOCAINE HCL/PF 100 MG/5ML
SYRINGE (ML) INTRAVENOUS
Status: DISCONTINUED | OUTPATIENT
Start: 2020-02-06 | End: 2020-02-06

## 2020-02-06 RX ORDER — MUPIROCIN 20 MG/G
OINTMENT TOPICAL 2 TIMES DAILY
Status: DISCONTINUED | OUTPATIENT
Start: 2020-02-06 | End: 2020-02-07

## 2020-02-06 RX ORDER — TRANEXAMIC ACID 100 MG/ML
INJECTION, SOLUTION INTRAVENOUS
Status: DISCONTINUED | OUTPATIENT
Start: 2020-02-06 | End: 2020-02-06

## 2020-02-06 RX ORDER — NICARDIPINE HYDROCHLORIDE 0.2 MG/ML
INJECTION INTRAVENOUS
Status: COMPLETED
Start: 2020-02-06 | End: 2020-02-06

## 2020-02-06 RX ORDER — POTASSIUM CHLORIDE 29.8 MG/ML
80 INJECTION INTRAVENOUS
Status: DISCONTINUED | OUTPATIENT
Start: 2020-02-06 | End: 2020-02-07

## 2020-02-06 RX ORDER — ALBUMIN HUMAN 50 G/1000ML
25 SOLUTION INTRAVENOUS ONCE
Status: COMPLETED | OUTPATIENT
Start: 2020-02-06 | End: 2020-02-06

## 2020-02-06 RX ORDER — POTASSIUM CHLORIDE 14.9 MG/ML
60 INJECTION INTRAVENOUS
Status: DISCONTINUED | OUTPATIENT
Start: 2020-02-06 | End: 2020-02-06

## 2020-02-06 RX ORDER — FUROSEMIDE 10 MG/ML
INJECTION INTRAMUSCULAR; INTRAVENOUS
Status: DISCONTINUED | OUTPATIENT
Start: 2020-02-06 | End: 2020-02-06

## 2020-02-06 RX ORDER — MAGNESIUM SULFATE HEPTAHYDRATE 40 MG/ML
4 INJECTION, SOLUTION INTRAVENOUS
Status: DISCONTINUED | OUTPATIENT
Start: 2020-02-06 | End: 2020-02-07

## 2020-02-06 RX ORDER — MIDAZOLAM HYDROCHLORIDE 1 MG/ML
INJECTION, SOLUTION INTRAMUSCULAR; INTRAVENOUS
Status: DISCONTINUED | OUTPATIENT
Start: 2020-02-06 | End: 2020-02-06

## 2020-02-06 RX ORDER — VASOPRESSIN 20 [USP'U]/ML
INJECTION, SOLUTION INTRAMUSCULAR; SUBCUTANEOUS
Status: DISCONTINUED | OUTPATIENT
Start: 2020-02-06 | End: 2020-02-06

## 2020-02-06 RX ORDER — ROCURONIUM BROMIDE 10 MG/ML
INJECTION, SOLUTION INTRAVENOUS
Status: DISCONTINUED | OUTPATIENT
Start: 2020-02-06 | End: 2020-02-06

## 2020-02-06 RX ORDER — RIFAMPIN 600 MG/10ML
INJECTION, POWDER, LYOPHILIZED, FOR SOLUTION INTRAVENOUS
Status: DISCONTINUED | OUTPATIENT
Start: 2020-02-06 | End: 2020-02-06 | Stop reason: HOSPADM

## 2020-02-06 RX ORDER — ACETAMINOPHEN 10 MG/ML
INJECTION, SOLUTION INTRAVENOUS
Status: DISCONTINUED | OUTPATIENT
Start: 2020-02-06 | End: 2020-02-06

## 2020-02-06 RX ORDER — RIFAMPIN 600 MG/10ML
600 INJECTION, POWDER, LYOPHILIZED, FOR SOLUTION INTRAVENOUS ONCE
Status: DISCONTINUED | OUTPATIENT
Start: 2020-02-06 | End: 2020-02-06 | Stop reason: HOSPADM

## 2020-02-06 RX ORDER — POTASSIUM CHLORIDE 14.9 MG/ML
60 INJECTION INTRAVENOUS
Status: DISCONTINUED | OUTPATIENT
Start: 2020-02-06 | End: 2020-02-07

## 2020-02-06 RX ORDER — MAGNESIUM SULFATE HEPTAHYDRATE 40 MG/ML
INJECTION, SOLUTION INTRAVENOUS
Status: DISCONTINUED | OUTPATIENT
Start: 2020-02-06 | End: 2020-02-06

## 2020-02-06 RX ORDER — HYDRALAZINE HYDROCHLORIDE 20 MG/ML
10 INJECTION INTRAMUSCULAR; INTRAVENOUS EVERY 6 HOURS PRN
Status: DISCONTINUED | OUTPATIENT
Start: 2020-02-06 | End: 2020-02-18

## 2020-02-06 RX ORDER — POLYETHYLENE GLYCOL 3350 17 G/17G
17 POWDER, FOR SOLUTION ORAL DAILY
Status: DISCONTINUED | OUTPATIENT
Start: 2020-02-07 | End: 2020-02-27 | Stop reason: HOSPADM

## 2020-02-06 RX ORDER — DOCUSATE SODIUM 100 MG/1
100 CAPSULE, LIQUID FILLED ORAL 2 TIMES DAILY
Status: DISCONTINUED | OUTPATIENT
Start: 2020-02-06 | End: 2020-02-27 | Stop reason: HOSPADM

## 2020-02-06 RX ORDER — HEPARIN SODIUM 1000 [USP'U]/ML
INJECTION, SOLUTION INTRAVENOUS; SUBCUTANEOUS
Status: DISCONTINUED | OUTPATIENT
Start: 2020-02-06 | End: 2020-02-06

## 2020-02-06 RX ORDER — CEFEPIME HYDROCHLORIDE 2 G/1
2 INJECTION, POWDER, FOR SOLUTION INTRAVENOUS
Status: COMPLETED | OUTPATIENT
Start: 2020-02-06 | End: 2020-02-08

## 2020-02-06 RX ORDER — ETOMIDATE 2 MG/ML
INJECTION INTRAVENOUS
Status: DISCONTINUED | OUTPATIENT
Start: 2020-02-06 | End: 2020-02-06

## 2020-02-06 RX ORDER — MAGNESIUM SULFATE HEPTAHYDRATE 40 MG/ML
2 INJECTION, SOLUTION INTRAVENOUS
Status: DISCONTINUED | OUTPATIENT
Start: 2020-02-06 | End: 2020-02-06

## 2020-02-06 RX ORDER — NITROGLYCERIN 5 MG/ML
INJECTION, SOLUTION INTRAVENOUS
Status: DISCONTINUED | OUTPATIENT
Start: 2020-02-06 | End: 2020-02-06

## 2020-02-06 RX ADMIN — Medication 30 MG: at 07:02

## 2020-02-06 RX ADMIN — MAGNESIUM SULFATE IN WATER 1 G: 40 INJECTION, SOLUTION INTRAVENOUS at 11:02

## 2020-02-06 RX ADMIN — NITROGLYCERIN 50 MCG: 5 INJECTION, SOLUTION INTRAVENOUS at 11:02

## 2020-02-06 RX ADMIN — NOREPINEPHRINE BITARTRATE 0.02 MG: 1 INJECTION, SOLUTION, CONCENTRATE INTRAVENOUS at 07:02

## 2020-02-06 RX ADMIN — PROPOFOL 50 MCG/KG/MIN: 10 INJECTION, EMULSION INTRAVENOUS at 04:02

## 2020-02-06 RX ADMIN — LIDOCAINE HYDROCHLORIDE 100 MG: 20 INJECTION, SOLUTION INTRAVENOUS at 07:02

## 2020-02-06 RX ADMIN — PROTAMINE SULFATE 310 MG: 10 INJECTION, SOLUTION INTRAVENOUS at 11:02

## 2020-02-06 RX ADMIN — ALBUMIN HUMAN 25 G: 50 SOLUTION INTRAVENOUS at 02:02

## 2020-02-06 RX ADMIN — ROCURONIUM BROMIDE 20 MG: 10 INJECTION, SOLUTION INTRAVENOUS at 11:02

## 2020-02-06 RX ADMIN — ROCURONIUM BROMIDE 50 MG: 10 INJECTION, SOLUTION INTRAVENOUS at 09:02

## 2020-02-06 RX ADMIN — TRANEXAMIC ACID 1 MG/KG/HR: 100 INJECTION, SOLUTION INTRAVENOUS at 07:02

## 2020-02-06 RX ADMIN — LIDOCAINE HYDROCHLORIDE 100 MG: 20 INJECTION, SOLUTION INTRAVENOUS at 10:02

## 2020-02-06 RX ADMIN — VASOPRESSIN 0.04 UNITS/MIN: 20 INJECTION INTRAVENOUS at 09:02

## 2020-02-06 RX ADMIN — MIDAZOLAM HYDROCHLORIDE 4 MG: 1 INJECTION, SOLUTION INTRAMUSCULAR; INTRAVENOUS at 07:02

## 2020-02-06 RX ADMIN — HEPARIN SODIUM 5000 UNITS: 1000 INJECTION, SOLUTION INTRAVENOUS; SUBCUTANEOUS at 09:02

## 2020-02-06 RX ADMIN — FUROSEMIDE 20 MG: 10 INJECTION, SOLUTION INTRAMUSCULAR; INTRAVENOUS at 11:02

## 2020-02-06 RX ADMIN — POTASSIUM CHLORIDE: 14.9 INJECTION, SOLUTION INTRAVENOUS at 10:02

## 2020-02-06 RX ADMIN — LIDOCAINE HYDROCHLORIDE 100 MG: 20 INJECTION, SOLUTION INTRAVENOUS at 11:02

## 2020-02-06 RX ADMIN — ACETAMINOPHEN 1000 MG: 10 INJECTION, SOLUTION INTRAVENOUS at 08:02

## 2020-02-06 RX ADMIN — CALCIUM CHLORIDE 1000 MG: 100 INJECTION, SOLUTION INTRAVENOUS at 10:02

## 2020-02-06 RX ADMIN — SUGAMMADEX 169 MG: 100 INJECTION, SOLUTION INTRAVENOUS at 12:02

## 2020-02-06 RX ADMIN — FENTANYL CITRATE 300 MCG: 50 INJECTION, SOLUTION INTRAMUSCULAR; INTRAVENOUS at 07:02

## 2020-02-06 RX ADMIN — ROCURONIUM BROMIDE 30 MG: 10 INJECTION, SOLUTION INTRAVENOUS at 10:02

## 2020-02-06 RX ADMIN — PHYTONADIONE 10 MG: 10 INJECTION, EMULSION INTRAMUSCULAR; INTRAVENOUS; SUBCUTANEOUS at 12:02

## 2020-02-06 RX ADMIN — HEPARIN SODIUM 40000 UNITS: 1000 INJECTION, SOLUTION INTRAVENOUS; SUBCUTANEOUS at 09:02

## 2020-02-06 RX ADMIN — POTASSIUM CHLORIDE 60 MEQ: 14.9 INJECTION, SOLUTION INTRAVENOUS at 03:02

## 2020-02-06 RX ADMIN — SODIUM CHLORIDE 1 UNITS/HR: 9 INJECTION, SOLUTION INTRAVENOUS at 10:02

## 2020-02-06 RX ADMIN — NICARDIPINE HYDROCHLORIDE 7.5 MG/HR: 0.2 INJECTION, SOLUTION INTRAVENOUS at 02:02

## 2020-02-06 RX ADMIN — MUPIROCIN: 20 OINTMENT TOPICAL at 08:02

## 2020-02-06 RX ADMIN — NOREPINEPHRINE BITARTRATE 0.04 MCG/KG/MIN: 1 INJECTION, SOLUTION, CONCENTRATE INTRAVENOUS at 09:02

## 2020-02-06 RX ADMIN — VANCOMYCIN HYDROCHLORIDE 1250 MG: 1.25 INJECTION, POWDER, LYOPHILIZED, FOR SOLUTION INTRAVENOUS at 08:02

## 2020-02-06 RX ADMIN — SODIUM CHLORIDE, SODIUM GLUCONATE, SODIUM ACETATE, POTASSIUM CHLORIDE, MAGNESIUM CHLORIDE, SODIUM PHOSPHATE, DIBASIC, AND POTASSIUM PHOSPHATE: .53; .5; .37; .037; .03; .012; .00082 INJECTION, SOLUTION INTRAVENOUS at 08:02

## 2020-02-06 RX ADMIN — CEFEPIME 2 G: 2 INJECTION, POWDER, FOR SOLUTION INTRAMUSCULAR; INTRAVENOUS at 08:02

## 2020-02-06 RX ADMIN — TRANEXAMIC ACID 900 MG: 100 INJECTION, SOLUTION INTRAVENOUS at 08:02

## 2020-02-06 RX ADMIN — FENTANYL CITRATE 100 MCG: 50 INJECTION, SOLUTION INTRAMUSCULAR; INTRAVENOUS at 12:02

## 2020-02-06 RX ADMIN — VASOPRESSIN 2 UNITS: 20 INJECTION INTRAVENOUS at 10:02

## 2020-02-06 RX ADMIN — SODIUM CHLORIDE 0.5 UNITS/HR: 9 INJECTION, SOLUTION INTRAVENOUS at 02:02

## 2020-02-06 RX ADMIN — ROCURONIUM BROMIDE 100 MG: 10 INJECTION, SOLUTION INTRAVENOUS at 07:02

## 2020-02-06 RX ADMIN — NICARDIPINE HYDROCHLORIDE 7.5 MG/HR: 0.2 INJECTION, SOLUTION INTRAVENOUS at 04:02

## 2020-02-06 RX ADMIN — SODIUM CHLORIDE, SODIUM GLUCONATE, SODIUM ACETATE, POTASSIUM CHLORIDE, MAGNESIUM CHLORIDE, SODIUM PHOSPHATE, DIBASIC, AND POTASSIUM PHOSPHATE: .53; .5; .37; .037; .03; .012; .00082 INJECTION, SOLUTION INTRAVENOUS at 07:02

## 2020-02-06 RX ADMIN — PROPOFOL 40 MCG/KG/MIN: 10 INJECTION, EMULSION INTRAVENOUS at 08:02

## 2020-02-06 RX ADMIN — CEFEPIME 2 G: 2 INJECTION, POWDER, FOR SOLUTION INTRAVENOUS at 08:02

## 2020-02-06 RX ADMIN — VASOPRESSIN 3 UNITS: 20 INJECTION INTRAVENOUS at 10:02

## 2020-02-06 RX ADMIN — Medication 20 MG: at 10:02

## 2020-02-06 RX ADMIN — PROPOFOL 20 MCG/KG/MIN: 10 INJECTION, EMULSION INTRAVENOUS at 11:02

## 2020-02-06 RX ADMIN — VASOPRESSIN 5 UNITS: 20 INJECTION INTRAVENOUS at 10:02

## 2020-02-06 RX ADMIN — EPINEPHRINE 0.04 MCG/KG/MIN: 1 INJECTION INTRAMUSCULAR; INTRAVENOUS; SUBCUTANEOUS at 07:02

## 2020-02-06 RX ADMIN — ALBUMIN (HUMAN) 25 G: 12.5 SOLUTION INTRAVENOUS at 02:02

## 2020-02-06 RX ADMIN — ETOMIDATE 10 MG: 2 INJECTION, SOLUTION INTRAVENOUS at 07:02

## 2020-02-06 RX ADMIN — HYDRALAZINE HYDROCHLORIDE 10 MG: 20 INJECTION INTRAMUSCULAR; INTRAVENOUS at 11:02

## 2020-02-06 RX ADMIN — EPINEPHRINE 0.11 MCG/KG/MIN: 1 INJECTION INTRAMUSCULAR; INTRAVENOUS; SUBCUTANEOUS at 04:02

## 2020-02-06 NOTE — CARE UPDATE
Brief HTS Update Note    Disengaged tachytherapy on Meditronic AICD. Some difficulty in doing so, appreciate EP assistance, ultimately had to manually disengage therapies which will need to be reprogrammed following surgery.    Tommy Cochran MD  PGY-4, Cardiology  Pager [phone]

## 2020-02-06 NOTE — ANESTHESIA PROCEDURE NOTES
Sewell Ana Line    Diagnosis: LVAD  Patient location during procedure: done in OR  Procedure start time: 2/6/2020 7:46 AM  Timeout: 2/6/2020 7:45 AM  Procedure end time: 2/6/2020 7:59 AM    Staffing  Authorizing Provider: Luiz Garcia Jr., MD  Performing Provider: Nelida Mays MD    Anesthesiologist was present at the time of the procedure.  Preanesthetic Checklist  Completed: patient identified, site marked, surgical consent, pre-op evaluation, timeout performed, IV checked, risks and benefits discussed, monitors and equipment checked and anesthesia consent given  [place]ell Ana Line  Skin Prep: chlorhexidine gluconate and isopropyl alcohol  Local Infiltration: none  Location: right,  internal jugular vein  Vessel Caliber: medium, patent, compressibility normal  Vascular Doppler:  not done  Coaxial introducer size: 9F double lumen. manometry used.  Catheter placement by yes. Heme positive aspiration all ports.Insertion Attempts: 1  Indication: intravenous therapy, hemodynamic monitoring  Ultrasound Guidance  Needle advanced into vessel with real time Ultrasound guidance.  Image recorded and saved.  Guidewire confirmed in vessel.  Sterile sheath used.  Assessment  Central Line Bundle Protocol followed. Hand hygiene before procedure, surgical cap worn, surgical mask worn, sterile surgical gloves worn, large sterile drape used.  Verification: ultrasound and blood return  Dressing: sutured in place and taped  Patient: Tolerated Well
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
## 2020-02-06 NOTE — PROGRESS NOTES
IABP removed per Dr Sharma at bedside. Direct manual pressure applied for 30 minutes. Positive doppler pulse obtained in dorsalis pedis and posterior tibial during frequent checks upon removal. No hematoma or bleeding noted. Clean dressing applied to groin. Patient tolerated procedure well.

## 2020-02-06 NOTE — PLAN OF CARE
`  CMICU DAILY GOALS       A: Awake    RASS: Goal - RASS Goal: 0-->alert and calm  Actual - RASS ([place] Agitation-Sedation Scale): 0-->alert and calm   Restraint necessity: Clinical Justification: Removing medical devices  B: Breath   SBT: Not intubated   C: Coordinate A & B, analgesics/sedatives   Pain: managed    SAT: Not intubated  D: Delirium   CAM-ICU: Overall CAM-ICU: Negative  E: Early Mobility   MOVE Screen: Pass   Activity: Activity Management: bedrest maintained per order  FAS: Feeding/Nutrition   Diet order: Diet/Nutrition Received: restrict fluids,   Fluid restriction: Fluid Requirement: 1000cc FR  T: Thrombus   DVT prophylaxis: VTE Required Core Measure: Pharmacological prophylaxis initiated/maintained  H: HOB Elevation   Head of Bed (HOB): HOB flat  U: Ulcer Prophylaxis   GI: yes  G: Glucose control   managed    S: Skin   Bundle compliance: yes   Bathing/Skin Care: bath, chlorhexidine, bath, complete, incontinence care, linen changed Date: 2/5/2020 1800  B: Bowel Function   no issues   I: Indwelling Catheters   Mc necessity: [REMOVED]      Urethral Catheter 01/15/20 2030 Straight-tip 16 Fr.-Reason for Continuing Urinary Catheterization: Critically ill in ICU requiring intensive monitoring   CVC necessity: Yes   IPAD offered: Yes  D: De-escalation Antibx   No  Plan for the day   LVAD surgery scheduled for AM. Pt remains on lasix, dobutamine, epi, and heparin gtts. Urine output 3L for shift. CVP 20, 18, 15, 13.    Family/Goals of care/Code Status   Code Status: Full Code     No acute events throughout day, VS and assessment per flow sheet, patient progressing towards goals as tolerated, plan of care reviewed with Saba Lott and family, all concerns addressed, will continue to monitor.

## 2020-02-06 NOTE — TRANSFER OF CARE
"Anesthesia Transfer of Care Note    Patient: Saba Lott    Procedure(s) Performed: Procedure(s) (LRB):  INSERTION-LEFT VENTRICULAR ASSIST DEVICE (Left)  VALVULOPLASTY,MITRAL VALVE    Patient location: ICU    Anesthesia Type: general    Transport from OR: Transported from OR intubated on 100% O2 by AMBU with adequate controlled ventilation    Post pain: adequate analgesia    Post assessment: no apparent anesthetic complications    Post vital signs: stable    Level of consciousness: sedated    Nausea/Vomiting: no nausea/vomiting    Complications: none    Transfer of care protocol was followed      Last vitals:   Visit Vitals  /61   Pulse 105   Temp 36.8 °C (98.2 °F) (Oral)   Resp (!) 22   Ht 5' 7" (1.702 m)   Wt 84.6 kg (186 lb 8.2 oz)   SpO2 99%   BMI 29.21 kg/m²     "

## 2020-02-06 NOTE — PROGRESS NOTES
TELEPHONE CALL    [facility] rec'd TC from Pts mother & family member Joyce inquiring if they could move their [facility] reservation to Friday 2/7 and Sat 2/8.  [facility] provided education that reservation could be moved, but that program would only reimburse room on night of surgery. After discussion, family asked to not make any changes to current reservation. No further questions at this time, [facility]RA offered emotional support and encouraged family to reach with any needs. All are coping well at this time. SW to continue to follow.

## 2020-02-06 NOTE — PROGRESS NOTES
"[facility]  Endocrinology  Progress Note    Admit Date: 1/15/2020     Reason for Consult: Management of  Hyperglycemia     Surgical Procedure and Date:    ICD implantation 02/15/2019      HPI:   Patient is a 54 y.o. 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 Chronic MARC - Class II-III and mild LE edema.  Admitted to [facility] on Thursday due to severe SOB for a week with associated orthopnea, MARC, and PND unable to lie flat and found to be in acute diastolic heart failure.He was started on CRRT for a few days and tolerated well. Renal u/s was negative for obstruction and liver u/s without findings of cirrhosis. All of this was progression of cardiorenal syndrome with liver congestion from severe volume overload. No dx of DM noted on file. Patient denies history of DM at time of consult. Endocrinology consulted to manage BG during admission to [facility].          Lab Results   Component Value Date    HGBA1C 6.6 (H) 01/16/2020       Interval HPI:   Overnight events:   Patient now admitted to SICU, and is now intubated and sedated s/p LVAD placement.   No diet orders on file    Eating:   NPO  Nausea: No  Hypoglycemia and intervention: No  Fever: No  TPN and/or TF: No  If yes, type of TF/TPN and rate: None    BP (!) 78/0 (BP Location: Right arm, Patient Position: Lying)   Pulse 92   Temp 98.2 °F (36.8 °C) (Oral)   Resp 18   Ht 5' 7" (1.702 m)   Wt 84.6 kg (186 lb 8.2 oz)   SpO2 100%   BMI 29.21 kg/m²      Labs Reviewed and Include    Recent Labs   Lab 02/06/20  1248   *   CALCIUM 11.2*   ALBUMIN 3.1*   PROT 7.3   *   K 3.4*   CO2 26   CL 93*   BUN 41*   CREATININE 1.5*   ALKPHOS 113   ALT 22   AST 72*   BILITOT 1.8*     Lab Results   Component Value Date    WBC 13.92 (H) 02/06/2020    HGB 10.4 (L) 02/06/2020    " HCT 31.0 (L) 02/06/2020    MCV 81 (L) 02/06/2020     02/06/2020     No results for input(s): TSH, FREET4 in the last 168 hours.  Lab Results   Component Value Date    HGBA1C 6.6 (H) 01/16/2020       Nutritional status:   Body mass index is 29.21 kg/m².  Lab Results   Component Value Date    ALBUMIN 3.1 (L) 02/06/2020    ALBUMIN 3.9 02/06/2020    ALBUMIN 3.8 02/05/2020     Lab Results   Component Value Date    PREALBUMIN 23 01/28/2020    PREALBUMIN 15 (L) 01/22/2020    PREALBUMIN 17 (L) 07/23/2012       Estimated Creatinine Clearance: 57.8 mL/min (A) (based on SCr of 1.5 mg/dL (H)).    Accu-Checks  Recent Labs     02/05/20  0145 02/05/20  0802 02/05/20  1122 02/05/20  1748 02/05/20  2118 02/06/20  1240 02/06/20  1314 02/06/20  1402 02/06/20  1503 02/06/20  1606   POCTGLUCOSE 122* 124* 145* 157* 158* 118* 110 112* 137* 156*       Current Medications and/or Treatments Impacting Glycemic Control  Immunotherapy:    Immunosuppressants     None        Steroids:   Hormones (From admission, onward)    None        Pressors:    Autonomic Drugs (From admission, onward)    Start     Stop Route Frequency Ordered    02/06/20 1445  EPINEPHrine (ADRENALIN) 5 mg in sodium chloride 0.9% 250 mL infusion     Question Answer Comment   Titrate by: (in mcg/kg/min) 0.12    Titrate interval: (in minutes) 5    Titrate to maintain: (SBP or MAP or Cardiac Index) MAP    Greater than: (in mmHg) 65    Maximum dose: (in mcg/kg/min) 2        -- IV Continuous 02/06/20 1336        Hyperglycemia/Diabetes Medications:   Antihyperglycemics (From admission, onward)    Start     Stop Route Frequency Ordered    02/06/20 1400  insulin regular 100 Units in sodium chloride 0.9% 100 mL infusion     Question:  Insulin rate changes (DO NOT MODIFY ANSWER)  Answer:  [email or web address]\[email or web address]\CTS INSULIN [id number].pdf    -- IV Continuous 02/06/20 1309          ASSESSMENT and PLAN    * Cardiogenic shock  Managed per primary team  Avoid  hypoglycemia        Acute hyperglycemia  BG goal 140 - 180     Continue IV insulin infusion protocol  Requires intensive BG monitoring while on protocol     Discharge planning: TBD        Morbid obesity  Body mass index is 29.21 kg/m².  may contribute to insulin resistance        Presence of left ventricular assist device (LVAD)  Managed per primary team  Avoid hypoglycemia            Saad Mayo NP  Endocrinology  [facility]

## 2020-02-06 NOTE — ANESTHESIA PROCEDURE NOTES
Intubation  Performed by: Nelida Mays MD  Authorized by: Luiz Garcia Jr., MD     Intubation:     Induction:  Intravenous    Mask Ventilation:  Easy with oral airway    Attempts:  1    Attempted By:  Resident anesthesiologist    Method of Intubation:  Direct    Blade:  Booker 2    Laryngeal View Grade: Grade IIA - cords partially seen      Difficult Airway Encountered?: No      Complications:  None    Airway Device:  Oral endotracheal tube    Airway Device Size:  8.0    Style/Cuff Inflation:  Cuffed (inflated to minimal occlusive pressure)    Inflation Amount (mL):  5    Tube secured:  21    Secured at:  The lips    Placement Verified By:  Capnometry    Complicating Factors:  Anterior larynx    Findings Post-Intubation:  BS equal bilateral

## 2020-02-06 NOTE — ANESTHESIA POSTPROCEDURE EVALUATION
Anesthesia Post Evaluation    Patient: Saba Lott    Procedure(s) Performed: Procedure(s) (LRB):  INSERTION-LEFT VENTRICULAR ASSIST DEVICE (Left)  VALVULOPLASTY,MITRAL VALVE    Final Anesthesia Type: general    Patient location during evaluation: ICU  Patient participation: No - Unable to Participate, Intubation  Level of consciousness: sedated  Post-procedure vital signs: reviewed and stable  Pain management: adequate  Airway patency: patent    PONV status at discharge: No PONV  Anesthetic complications: no      Cardiovascular status: hemodynamically stable  Respiratory status: ETT, intubated and ventilator  Hydration status: euvolemic  Follow-up not needed.          Vitals Value Taken Time   /74 2/6/2020  7:01 AM   Temp 36.8 °C (98.2 °F) 2/6/2020  3:00 AM   Pulse 93 2/6/2020  3:42 PM   Resp 18 2/6/2020  3:42 PM   SpO2 100 % 2/6/2020  3:42 PM   Vitals shown include unvalidated device data.      No case tracking events are documented in the log.      Pain/Vicente Score: No data recorded

## 2020-02-06 NOTE — CARE UPDATE
Patient arrived from OR and placed on ventilator at documented settings. ABG's and EKG obtained. Nitric set at 20ppm with ambu connected to the front.

## 2020-02-06 NOTE — ANESTHESIA PROCEDURE NOTES
Central Line    Diagnosis: LVAD  Patient location during procedure: done in OR  Procedure start time: 2/6/2020 7:42 AM  Timeout: 2/6/2020 7:40 AM  Procedure end time: 2/6/2020 7:52 AM    Staffing  Authorizing Provider: Luiz Garcia Jr., MD  Performing Provider: Nelida Mays MD    Staffing  Anesthesiologist: Luiz Garcia Jr., MD  Resident/CRNA: Nelida Mays MD  Performed: resident/CRNA   Anesthesiologist was present at the time of the procedure.  Preanesthetic Checklist  Completed: patient identified, site marked, surgical consent, pre-op evaluation, timeout performed, IV checked, risks and benefits discussed, monitors and equipment checked and anesthesia consent given  Indication   Indication: vascular access, hemodynamic monitoring, med administration     Anesthesia   general anesthesia    Central Line   Skin Prep: skin prepped with ChloraPrep, skin prep agent completely dried prior to procedure  maximum sterile barriers used during central venous catheter insertion  hand hygiene performed prior to central venous catheter insertion  Location: left, internal jugular.   Catheter type: quad lumen  Catheter Size: 8.5 Fr  Ultrasound: vascular probe with ultrasound  Vessel Caliber: medium, patent  Needle advanced into vessel with real time Ultrasound guidance.  Guidewire confirmed in vessel.  Image recorded and saved.   Manometry: Venous cannualation confirmed by visual estimation of blood vessel pressure using manometry.  Insertion Attempts: 1   Securement:line sutured, chlorhexidine patch and blood return through all ports    Post-Procedure   Adverse Events:none    Guidewire Guidewire removed intact. Guidewire removed intact, verified with nurse.

## 2020-02-06 NOTE — PLAN OF CARE
CMICU DAILY GOALS       A: Awake    RASS: Goal - RASS Goal: 0-->alert and calm  Actual - RASS (Mcmillan Agitation-Sedation Scale): 0-->alert and calm   Restraint necessity: Clinical Justification: Removing medical devices  B: Breath   SBT: Not intubated   C: Coordinate A & B, analgesics/sedatives   Pain: managed    SAT: Not intubated  D: Delirium   CAM-ICU: Overall CAM-ICU: Negative  E: Early Mobility   MOVE Screen: Pass   Activity: Activity Management: bedrest maintained per order  FAS: Feeding/Nutrition   Diet order: Diet/Nutrition Received: 2 gram sodium, low saturated fat/low cholesterol,   Fluid restriction: Fluid Requirement: 1000cc FR  T: Thrombus   DVT prophylaxis: VTE Required Core Measure: Pharmacological prophylaxis initiated/maintained  H: HOB Elevation   Head of Bed (HOB): HOB at 15 degrees  U: Ulcer Prophylaxis   GI: yes  G: Glucose control   managed    S: Skin   Bundle compliance: yes   Bathing/Skin Care: bath, chlorhexidine, bath, complete, incontinence care, linen changed Date: 2/6 night shift  B: Bowel Function   no issues   I: Indwelling Catheters   Mc necessity: [REMOVED]      Urethral Catheter 01/15/20 2030 Straight-tip 16 Fr.-Reason for Continuing Urinary Catheterization: Critically ill in ICU requiring intensive monitoring   CVC necessity: Yes   IPAD offered: No  D: De-escalation Antibx   Yes  Plan for the day   LVAD surgery today  Family/Goals of care/Code Status   Code Status: Full Code     No acute events throughout day, VS and assessment per flow sheet, patient progressing towards goals as tolerated, plan of care reviewed with Saba Lott and family, all concerns addressed, will continue to monitor.

## 2020-02-06 NOTE — ANESTHESIA PROCEDURE NOTES
MACIEL    Diagnosis: I42.9  Patient location during procedure: OR  Procedure start time: 2/6/2020 8:59 AM  Timeout: 2/6/2020 8:59 AM  Procedure end time: 2/6/2020 8:59 AM  Exam type: Baseline  Staffing  Anesthesiologist: Luiz Garcia Jr., MD  Performed: anesthesiologist and fellow   Preanesthetic Checklist  Completed: patient identified, surgical consent, pre-op evaluation, timeout performed, risks and benefits discussed, monitors and equipment checked, anesthesia consent given, oxygen available, suction available, hand hygiene performed and patient being monitored  Setup & Induction  Patient preparation: bite block inserted  Probe Insertion: easy  Exam: complete      Findings  Impression  Other Findings  PRE CPB    Severely decreased left ventricular systolic function EF 20%  LV KRZYSZTOF 6.5 cm   No clot in ASHUTOSH visualized   No AI   Moderate MR (Central VC 0.4) reflected in LUPV  No PFO, No IAS defect   Mildly depressed RV systolic function   Left pleural effusion present  IABP in place in good position 2 cm distal to LSCA   Aorta intact no dissection     Post   HM3 in good position, laminar flow   MR reduced s/p stitch   Outflow canula with adequate velocities  Right ventricle is moderately-severely depressed, improving on Radha, epi etc   Left pleural effusion decreased in size after suctioning of transudate   Aorta intact, no dissection   IABP in adequate position 1:2  Probe removed atraumatically      Probe Removal      Exam     Left Heart  Left Atruim: dilated  Left appendage velocity:41    Left Ventricle: 7 cm  LV Wall Thickness (posterior wall): cm, normal (men 0.6-1.0 cm; women 0.6-0.9 cm)    LVAD:no  Estimated Ejection Fraction: < 25% severe            Right Heart  Right Ventricle Function: mildly decreased  Right Atria: cm and normal    Intra Atrial Septum  PFO: no shunt by color flow doppler  no IAS aneurysm  no lipomatous hypertrophy  no Atrial Septal Defect (Asd)    Right Ventricle  Tricuspid Annulus S':1.2  cm/s    Aortic Valve:  Morphology: trileaflet  Regurgitation: no aortic valve regurgitation     Mitral Valve:  Morphology:normal  Jet Description: moderate    Tricuspid Valve:  Morphology: normal    Pulmonic Valve:  Morphology:normal    Great Vessels  Ascending Aorta Atherosclerosis: 2=mild dz (<3mm)  Aortic Arch Atherosclerosis: 2=mild dz (<3mm)  IABP: yes  Descending Aorta Atherosclerosis: 2=mild dz (<3mm)  Aorta    Descending aorta IABP: yes    Effusions  Effusions: left pleural    Summary  Findings discussed with surgeon.    Other Findings   PRE CPB    Severely decreased left ventricular systolic function EF 20%  LV KRZYSZTOF 6.5 cm   No clot in ASHUTOSH visualized   No AI   Moderate MR (Central VC 0.4) reflected in LUPV  No PFO, No IAS defect   Mildly depressed RV systolic function   Left pleural effusion present  IABP in place in good position 2 cm distal to LSCA   Aorta intact no dissection     Post   HM3 in good position, laminar flow   MR reduced s/p stitch   Outflow canula with adequate velocities  Right ventricle is moderately-severely depressed, improving on Radha, epi etc   Left pleural effusion decreased in size after suctioning of transudate   Aorta intact, no dissection   IABP in adequate position 1:2  Probe removed atraumatically

## 2020-02-06 NOTE — PROGRESS NOTES
02/06/2020  Perfusionist:  Yudith Mcmanus CCP, LP  Surgeon(s) and Role:     * David Joshi MD - Primary  Anesthesiologist:  Luiz Garcia Jr., MD    Past Medical History:   Diagnosis Date    Encounter for blood transfusion        Device implanted: HM 3 LVAD      For implantable VADs  Pump assembled and wet-tested by:Yudith Mcmanus CCP, LP  Pump SN: [id number]  Primary controller: [id number]  Backup controller: [id number]    At the end of the procedure, the current device settings and alarms were verified to be accurate.  The patient was transported to SICU post operatively, back-up equipment was either delivered to the patients room or verified by the bedside nurse, and report given.  There were no issues.    12:38 PM

## 2020-02-06 NOTE — ASSESSMENT & PLAN NOTE
BG goal 140 - 180     Continue IV insulin infusion protocol  Requires intensive BG monitoring while on protocol     Discharge planning: [place]

## 2020-02-06 NOTE — SUBJECTIVE & OBJECTIVE
"Interval HPI:   Overnight events:   Patient now admitted to SICU, and is now intubated and sedated s/p LVAD placement.   No diet orders on file    Eating:   NPO  Nausea: No  Hypoglycemia and intervention: No  Fever: No  TPN and/or TF: No  If yes, type of TF/TPN and rate: None    BP (!) 78/0 (BP Location: Right arm, Patient Position: Lying)   Pulse 92   Temp 98.2 °F (36.8 °C) (Oral)   Resp 18   Ht 5' 7" (1.702 m)   Wt 84.6 kg (186 lb 8.2 oz)   SpO2 100%   BMI 29.21 kg/m²     Labs Reviewed and Include    Recent Labs   Lab 02/06/20  1248   *   CALCIUM 11.2*   ALBUMIN 3.1*   PROT 7.3   *   K 3.4*   CO2 26   CL 93*   BUN 41*   CREATININE 1.5*   ALKPHOS 113   ALT 22   AST 72*   BILITOT 1.8*     Lab Results   Component Value Date    WBC 13.92 (H) 02/06/2020    HGB 10.4 (L) 02/06/2020    HCT 31.0 (L) 02/06/2020    MCV 81 (L) 02/06/2020     02/06/2020     No results for input(s): TSH, FREET4 in the last 168 hours.  Lab Results   Component Value Date    HGBA1C 6.6 (H) 01/16/2020       Nutritional status:   Body mass index is 29.21 kg/m².  Lab Results   Component Value Date    ALBUMIN 3.1 (L) 02/06/2020    ALBUMIN 3.9 02/06/2020    ALBUMIN 3.8 02/05/2020     Lab Results   Component Value Date    PREALBUMIN 23 01/28/2020    PREALBUMIN 15 (L) 01/22/2020    PREALBUMIN 17 (L) 07/23/2012       Estimated Creatinine Clearance: 57.8 mL/min (A) (based on SCr of 1.5 mg/dL (H)).    Accu-Checks  Recent Labs     02/05/20  0145 02/05/20  0802 02/05/20  1122 02/05/20  1748 02/05/20  2118 02/06/20  1240 02/06/20  1314 02/06/20  1402 02/06/20  1503 02/06/20  1606   POCTGLUCOSE 122* 124* 145* 157* 158* 118* 110 112* 137* 156*       Current Medications and/or Treatments Impacting Glycemic Control  Immunotherapy:    Immunosuppressants     None        Steroids:   Hormones (From admission, onward)    None        Pressors:    Autonomic Drugs (From admission, onward)    Start     Stop Route Frequency Ordered    02/06/20 " 1445  EPINEPHrine (ADRENALIN) 5 mg in sodium chloride 0.9% 250 mL infusion     Question Answer Comment   Titrate by: (in mcg/kg/min) 0.12    Titrate interval: (in minutes) 5    Titrate to maintain: (SBP or MAP or Cardiac Index) MAP    Greater than: (in mmHg) 65    Maximum dose: (in mcg/kg/min) 2        -- IV Continuous 02/06/20 1336        Hyperglycemia/Diabetes Medications:   Antihyperglycemics (From admission, onward)    Start     Stop Route Frequency Ordered    02/06/20 1400  insulin regular 100 Units in sodium chloride 0.9% 100 mL infusion     Question:  Insulin rate changes (DO NOT MODIFY ANSWER)  Answer:  [email or web address]\Images\[email or web address]\InsulinInfusions\CTS INSULIN [id number].pdf    -- IV Continuous 02/06/20 130

## 2020-02-07 ENCOUNTER — DOCUMENTATION ONLY (OUTPATIENT)
Dept: ELECTROPHYSIOLOGY | Facility: CLINIC | Age: 55
End: 2020-02-07

## 2020-02-07 ENCOUNTER — ANESTHESIA (OUTPATIENT)
Dept: SURGERY | Facility: HOSPITAL | Age: 55
DRG: 001 | End: 2020-02-07
Payer: MEDICARE

## 2020-02-07 LAB
ALBUMIN SERPL BCP-MCNC: 3.5 G/DL (ref 3.5–5.2)
ALLENS TEST: ABNORMAL
ALP SERPL-CCNC: 102 U/L (ref 55–135)
ALT SERPL W/O P-5'-P-CCNC: 22 U/L (ref 10–44)
ANION GAP SERPL CALC-SCNC: 10 MMOL/L (ref 8–16)
ANION GAP SERPL CALC-SCNC: 11 MMOL/L (ref 8–16)
ANION GAP SERPL CALC-SCNC: 12 MMOL/L (ref 8–16)
ANION GAP SERPL CALC-SCNC: 12 MMOL/L (ref 8–16)
ANION GAP SERPL CALC-SCNC: 13 MMOL/L (ref 8–16)
ANION GAP SERPL CALC-SCNC: 9 MMOL/L (ref 8–16)
APTT BLDCRRT: 25.8 SEC (ref 21–32)
APTT BLDCRRT: 27 SEC (ref 21–32)
APTT BLDCRRT: 27.3 SEC (ref 21–32)
APTT BLDCRRT: 27.6 SEC (ref 21–32)
APTT BLDCRRT: 27.6 SEC (ref 21–32)
APTT BLDCRRT: 28.2 SEC (ref 21–32)
APTT BLDCRRT: 28.9 SEC (ref 21–32)
AST SERPL-CCNC: 105 U/L (ref 10–40)
BASOPHILS # BLD AUTO: 0.03 K/UL (ref 0–0.2)
BASOPHILS # BLD AUTO: 0.03 K/UL (ref 0–0.2)
BASOPHILS # BLD AUTO: 0.04 K/UL (ref 0–0.2)
BASOPHILS NFR BLD: 0.2 % (ref 0–1.9)
BASOPHILS NFR BLD: 0.3 % (ref 0–1.9)
BASOPHILS NFR BLD: 0.3 % (ref 0–1.9)
BILIRUB DIRECT SERPL-MCNC: 1.3 MG/DL (ref 0.1–0.3)
BILIRUB SERPL-MCNC: 1.8 MG/DL (ref 0.1–1)
BUN SERPL-MCNC: 31 MG/DL (ref 6–20)
BUN SERPL-MCNC: 31 MG/DL (ref 6–20)
BUN SERPL-MCNC: 32 MG/DL (ref 6–20)
BUN SERPL-MCNC: 33 MG/DL (ref 6–20)
BUN SERPL-MCNC: 34 MG/DL (ref 6–20)
BUN SERPL-MCNC: 37 MG/DL (ref 6–20)
CALCIUM SERPL-MCNC: 10.2 MG/DL (ref 8.7–10.5)
CALCIUM SERPL-MCNC: 10.4 MG/DL (ref 8.7–10.5)
CALCIUM SERPL-MCNC: 10.5 MG/DL (ref 8.7–10.5)
CALCIUM SERPL-MCNC: 9.5 MG/DL (ref 8.7–10.5)
CALCIUM SERPL-MCNC: 9.5 MG/DL (ref 8.7–10.5)
CALCIUM SERPL-MCNC: 9.7 MG/DL (ref 8.7–10.5)
CHLORIDE SERPL-SCNC: 101 MMOL/L (ref 95–110)
CHLORIDE SERPL-SCNC: 103 MMOL/L (ref 95–110)
CHLORIDE SERPL-SCNC: 103 MMOL/L (ref 95–110)
CHLORIDE SERPL-SCNC: 104 MMOL/L (ref 95–110)
CHLORIDE SERPL-SCNC: 99 MMOL/L (ref 95–110)
CHLORIDE SERPL-SCNC: 99 MMOL/L (ref 95–110)
CO2 SERPL-SCNC: 22 MMOL/L (ref 23–29)
CO2 SERPL-SCNC: 22 MMOL/L (ref 23–29)
CO2 SERPL-SCNC: 24 MMOL/L (ref 23–29)
CO2 SERPL-SCNC: 25 MMOL/L (ref 23–29)
CO2 SERPL-SCNC: 25 MMOL/L (ref 23–29)
CO2 SERPL-SCNC: 26 MMOL/L (ref 23–29)
CREAT SERPL-MCNC: 1.1 MG/DL (ref 0.5–1.4)
CREAT SERPL-MCNC: 1.1 MG/DL (ref 0.5–1.4)
CREAT SERPL-MCNC: 1.2 MG/DL (ref 0.5–1.4)
CREAT SERPL-MCNC: 1.3 MG/DL (ref 0.5–1.4)
CREAT SERPL-MCNC: 1.3 MG/DL (ref 0.5–1.4)
CREAT SERPL-MCNC: 1.4 MG/DL (ref 0.5–1.4)
DELSYS: ABNORMAL
DELSYS: ABNORMAL
DIFFERENTIAL METHOD: ABNORMAL
EOSINOPHIL # BLD AUTO: 0.1 K/UL (ref 0–0.5)
EOSINOPHIL NFR BLD: 0.3 % (ref 0–8)
EOSINOPHIL NFR BLD: 0.4 % (ref 0–8)
EOSINOPHIL NFR BLD: 0.5 % (ref 0–8)
EOSINOPHIL NFR BLD: 0.5 % (ref 0–8)
EOSINOPHIL NFR BLD: 0.7 % (ref 0–8)
EOSINOPHIL NFR BLD: 0.8 % (ref 0–8)
ERYTHROCYTE [DISTWIDTH] IN BLOOD BY AUTOMATED COUNT: 18.3 % (ref 11.5–14.5)
ERYTHROCYTE [DISTWIDTH] IN BLOOD BY AUTOMATED COUNT: 18.4 % (ref 11.5–14.5)
ERYTHROCYTE [DISTWIDTH] IN BLOOD BY AUTOMATED COUNT: 18.5 % (ref 11.5–14.5)
ERYTHROCYTE [DISTWIDTH] IN BLOOD BY AUTOMATED COUNT: 18.6 % (ref 11.5–14.5)
ERYTHROCYTE [SEDIMENTATION RATE] IN BLOOD BY WESTERGREN METHOD: 16 MM/H
ERYTHROCYTE [SEDIMENTATION RATE] IN BLOOD BY WESTERGREN METHOD: 16 MM/H
EST. GFR  (AFRICAN AMERICAN): >60 ML/MIN/1.73 M^2
EST. GFR  (NON AFRICAN AMERICAN): 56.2 ML/MIN/1.73 M^2
EST. GFR  (NON AFRICAN AMERICAN): >60 ML/MIN/1.73 M^2
GLUCOSE SERPL-MCNC: 121 MG/DL (ref 70–110)
GLUCOSE SERPL-MCNC: 128 MG/DL (ref 70–110)
GLUCOSE SERPL-MCNC: 131 MG/DL (ref 70–110)
GLUCOSE SERPL-MCNC: 142 MG/DL (ref 70–110)
GLUCOSE SERPL-MCNC: 145 MG/DL (ref 70–110)
GLUCOSE SERPL-MCNC: 152 MG/DL (ref 70–110)
GLUCOSE SERPL-MCNC: 156 MG/DL (ref 70–110)
GLUCOSE SERPL-MCNC: 167 MG/DL (ref 70–110)
HCO3 UR-SCNC: 24.2 MMOL/L (ref 24–28)
HCO3 UR-SCNC: 24.9 MMOL/L (ref 24–28)
HCO3 UR-SCNC: 25 MMOL/L (ref 24–28)
HCO3 UR-SCNC: 26.1 MMOL/L (ref 24–28)
HCO3 UR-SCNC: 27.9 MMOL/L (ref 24–28)
HCO3 UR-SCNC: 30.4 MMOL/L (ref 24–28)
HCO3 UR-SCNC: 35.7 MMOL/L (ref 24–28)
HCT VFR BLD AUTO: 30.5 % (ref 40–54)
HCT VFR BLD AUTO: 31.3 % (ref 40–54)
HCT VFR BLD AUTO: 31.4 % (ref 40–54)
HCT VFR BLD AUTO: 31.6 % (ref 40–54)
HCT VFR BLD AUTO: 32.5 % (ref 40–54)
HCT VFR BLD AUTO: 33.1 % (ref 40–54)
HCT VFR BLD CALC: 31 %PCV (ref 36–54)
HCT VFR BLD CALC: 33 %PCV (ref 36–54)
HCT VFR BLD CALC: 38 %PCV (ref 36–54)
HGB BLD-MCNC: 10 G/DL (ref 14–18)
HGB BLD-MCNC: 10.1 G/DL (ref 14–18)
HGB BLD-MCNC: 10.4 G/DL (ref 14–18)
HGB BLD-MCNC: 10.4 G/DL (ref 14–18)
HGB BLD-MCNC: 10.5 G/DL (ref 14–18)
HGB BLD-MCNC: 9.8 G/DL (ref 14–18)
IMM GRANULOCYTES # BLD AUTO: 0.12 K/UL (ref 0–0.04)
IMM GRANULOCYTES # BLD AUTO: 0.14 K/UL (ref 0–0.04)
IMM GRANULOCYTES # BLD AUTO: 0.14 K/UL (ref 0–0.04)
IMM GRANULOCYTES # BLD AUTO: 0.15 K/UL (ref 0–0.04)
IMM GRANULOCYTES # BLD AUTO: 0.15 K/UL (ref 0–0.04)
IMM GRANULOCYTES # BLD AUTO: 0.19 K/UL (ref 0–0.04)
IMM GRANULOCYTES NFR BLD AUTO: 0.8 % (ref 0–0.5)
IMM GRANULOCYTES NFR BLD AUTO: 0.9 % (ref 0–0.5)
IMM GRANULOCYTES NFR BLD AUTO: 1 % (ref 0–0.5)
IMM GRANULOCYTES NFR BLD AUTO: 1 % (ref 0–0.5)
INR PPP: 1.3 (ref 0.8–1.2)
LDH SERPL L TO P-CCNC: 0.88 MMOL/L (ref 0.36–1.25)
LDH SERPL L TO P-CCNC: 562 U/L (ref 110–260)
LYMPHOCYTES # BLD AUTO: 0.6 K/UL (ref 1–4.8)
LYMPHOCYTES # BLD AUTO: 0.6 K/UL (ref 1–4.8)
LYMPHOCYTES # BLD AUTO: 0.7 K/UL (ref 1–4.8)
LYMPHOCYTES # BLD AUTO: 0.8 K/UL (ref 1–4.8)
LYMPHOCYTES NFR BLD: 3.4 % (ref 18–48)
LYMPHOCYTES NFR BLD: 3.5 % (ref 18–48)
LYMPHOCYTES NFR BLD: 3.6 % (ref 18–48)
LYMPHOCYTES NFR BLD: 3.8 % (ref 18–48)
LYMPHOCYTES NFR BLD: 4.3 % (ref 18–48)
LYMPHOCYTES NFR BLD: 5.5 % (ref 18–48)
MAGNESIUM SERPL-MCNC: 2.3 MG/DL (ref 1.6–2.6)
MCH RBC QN AUTO: 25.8 PG (ref 27–31)
MCH RBC QN AUTO: 26 PG (ref 27–31)
MCH RBC QN AUTO: 26 PG (ref 27–31)
MCH RBC QN AUTO: 26.2 PG (ref 27–31)
MCH RBC QN AUTO: 26.6 PG (ref 27–31)
MCH RBC QN AUTO: 26.9 PG (ref 27–31)
MCHC RBC AUTO-ENTMCNC: 31.4 G/DL (ref 32–36)
MCHC RBC AUTO-ENTMCNC: 31.6 G/DL (ref 32–36)
MCHC RBC AUTO-ENTMCNC: 32 G/DL (ref 32–36)
MCHC RBC AUTO-ENTMCNC: 32.1 G/DL (ref 32–36)
MCHC RBC AUTO-ENTMCNC: 32.2 G/DL (ref 32–36)
MCHC RBC AUTO-ENTMCNC: 33.5 G/DL (ref 32–36)
MCV RBC AUTO: 80 FL (ref 82–98)
MCV RBC AUTO: 81 FL (ref 82–98)
MCV RBC AUTO: 82 FL (ref 82–98)
MCV RBC AUTO: 83 FL (ref 82–98)
METHEMOGLOBIN: 0.9 % (ref 0–3)
MODE: ABNORMAL
MODE: ABNORMAL
MONOCYTES # BLD AUTO: 0.9 K/UL (ref 0.3–1)
MONOCYTES # BLD AUTO: 1.2 K/UL (ref 0.3–1)
MONOCYTES # BLD AUTO: 1.3 K/UL (ref 0.3–1)
MONOCYTES NFR BLD: 6.3 % (ref 4–15)
MONOCYTES NFR BLD: 6.9 % (ref 4–15)
MONOCYTES NFR BLD: 7.2 % (ref 4–15)
MONOCYTES NFR BLD: 7.2 % (ref 4–15)
MONOCYTES NFR BLD: 7.3 % (ref 4–15)
MONOCYTES NFR BLD: 8.2 % (ref 4–15)
NEUTROPHILS # BLD AUTO: 11.9 K/UL (ref 1.8–7.7)
NEUTROPHILS # BLD AUTO: 13.5 K/UL (ref 1.8–7.7)
NEUTROPHILS # BLD AUTO: 15.2 K/UL (ref 1.8–7.7)
NEUTROPHILS # BLD AUTO: 15.9 K/UL (ref 1.8–7.7)
NEUTROPHILS # BLD AUTO: 16 K/UL (ref 1.8–7.7)
NEUTROPHILS # BLD AUTO: 16.4 K/UL (ref 1.8–7.7)
NEUTROPHILS NFR BLD: 85.8 % (ref 38–73)
NEUTROPHILS NFR BLD: 86.3 % (ref 38–73)
NEUTROPHILS NFR BLD: 87.2 % (ref 38–73)
NEUTROPHILS NFR BLD: 87.4 % (ref 38–73)
NEUTROPHILS NFR BLD: 88 % (ref 38–73)
NEUTROPHILS NFR BLD: 88.2 % (ref 38–73)
NRBC BLD-RTO: 0 /100 WBC
PCO2 BLDA: 32 MMHG (ref 35–45)
PCO2 BLDA: 34.9 MMHG (ref 35–45)
PCO2 BLDA: 36 MMHG (ref 35–45)
PCO2 BLDA: 36.2 MMHG (ref 35–45)
PCO2 BLDA: 36.6 MMHG (ref 35–45)
PCO2 BLDA: 36.9 MMHG (ref 35–45)
PCO2 BLDA: 38.5 MMHG (ref 35–45)
PEEP: 5
PEEP: 5
PH SMN: 7.44 [PH] (ref 7.35–7.45)
PH SMN: 7.45 [PH] (ref 7.35–7.45)
PH SMN: 7.48 [PH] (ref 7.35–7.45)
PH SMN: 7.49 [PH] (ref 7.35–7.45)
PH SMN: 7.5 [PH] (ref 7.35–7.45)
PH SMN: 7.5 [PH] (ref 7.35–7.45)
PH SMN: 7.59 [PH] (ref 7.35–7.45)
PLATELET # BLD AUTO: 194 K/UL (ref 150–350)
PLATELET # BLD AUTO: 204 K/UL (ref 150–350)
PLATELET # BLD AUTO: 214 K/UL (ref 150–350)
PLATELET # BLD AUTO: 222 K/UL (ref 150–350)
PLATELET # BLD AUTO: 236 K/UL (ref 150–350)
PLATELET # BLD AUTO: 236 K/UL (ref 150–350)
PMV BLD AUTO: 9.1 FL (ref 9.2–12.9)
PMV BLD AUTO: 9.7 FL (ref 9.2–12.9)
PMV BLD AUTO: 9.8 FL (ref 9.2–12.9)
PMV BLD AUTO: 9.8 FL (ref 9.2–12.9)
PO2 BLDA: 137 MMHG (ref 80–100)
PO2 BLDA: 157 MMHG (ref 80–100)
PO2 BLDA: 170 MMHG (ref 80–100)
PO2 BLDA: 172 MMHG (ref 80–100)
PO2 BLDA: 191 MMHG (ref 80–100)
PO2 BLDA: 196 MMHG (ref 80–100)
PO2 BLDA: 295 MMHG (ref 80–100)
POC ACTIVATED CLOTTING TIME K: 142 SEC (ref 74–137)
POC BE: 1 MMOL/L
POC BE: 14 MMOL/L
POC BE: 3 MMOL/L
POC BE: 5 MMOL/L
POC BE: 7 MMOL/L
POC IONIZED CALCIUM: 1.06 MMOL/L (ref 1.06–1.42)
POC IONIZED CALCIUM: 1.14 MMOL/L (ref 1.06–1.42)
POC IONIZED CALCIUM: 1.38 MMOL/L (ref 1.06–1.42)
POC SATURATED O2: 100 % (ref 95–100)
POC SATURATED O2: 99 % (ref 95–100)
POC TCO2: 25 MMOL/L (ref 23–27)
POC TCO2: 26 MMOL/L (ref 23–27)
POC TCO2: 26 MMOL/L (ref 23–27)
POC TCO2: 27 MMOL/L (ref 23–27)
POC TCO2: 29 MMOL/L (ref 23–27)
POC TCO2: 32 MMOL/L (ref 23–27)
POC TCO2: 37 MMOL/L (ref 23–27)
POCT GLUCOSE: 106 MG/DL (ref 70–110)
POCT GLUCOSE: 107 MG/DL (ref 70–110)
POCT GLUCOSE: 109 MG/DL (ref 70–110)
POCT GLUCOSE: 113 MG/DL (ref 70–110)
POCT GLUCOSE: 114 MG/DL (ref 70–110)
POCT GLUCOSE: 116 MG/DL (ref 70–110)
POCT GLUCOSE: 121 MG/DL (ref 70–110)
POCT GLUCOSE: 122 MG/DL (ref 70–110)
POCT GLUCOSE: 131 MG/DL (ref 70–110)
POCT GLUCOSE: 133 MG/DL (ref 70–110)
POCT GLUCOSE: 135 MG/DL (ref 70–110)
POCT GLUCOSE: 140 MG/DL (ref 70–110)
POCT GLUCOSE: 144 MG/DL (ref 70–110)
POCT GLUCOSE: 147 MG/DL (ref 70–110)
POCT GLUCOSE: 147 MG/DL (ref 70–110)
POCT GLUCOSE: 148 MG/DL (ref 70–110)
POCT GLUCOSE: 151 MG/DL (ref 70–110)
POCT GLUCOSE: 155 MG/DL (ref 70–110)
POCT GLUCOSE: 173 MG/DL (ref 70–110)
POCT GLUCOSE: 84 MG/DL (ref 70–110)
POCT GLUCOSE: 85 MG/DL (ref 70–110)
POCT GLUCOSE: 99 MG/DL (ref 70–110)
POTASSIUM BLD-SCNC: 3.6 MMOL/L (ref 3.5–5.1)
POTASSIUM BLD-SCNC: 4.1 MMOL/L (ref 3.5–5.1)
POTASSIUM BLD-SCNC: 4.2 MMOL/L (ref 3.5–5.1)
POTASSIUM SERPL-SCNC: 4.1 MMOL/L (ref 3.5–5.1)
POTASSIUM SERPL-SCNC: 4.2 MMOL/L (ref 3.5–5.1)
POTASSIUM SERPL-SCNC: 4.4 MMOL/L (ref 3.5–5.1)
POTASSIUM SERPL-SCNC: 4.5 MMOL/L (ref 3.5–5.1)
POTASSIUM SERPL-SCNC: 4.5 MMOL/L (ref 3.5–5.1)
POTASSIUM SERPL-SCNC: 4.6 MMOL/L (ref 3.5–5.1)
PROT SERPL-MCNC: 7.6 G/DL (ref 6–8.4)
PROTHROMBIN TIME: 12.6 SEC (ref 9–12.5)
PROTHROMBIN TIME: 12.7 SEC (ref 9–12.5)
PROTHROMBIN TIME: 12.9 SEC (ref 9–12.5)
PROTHROMBIN TIME: 12.9 SEC (ref 9–12.5)
PROTHROMBIN TIME: 13 SEC (ref 9–12.5)
PROTHROMBIN TIME: 13.3 SEC (ref 9–12.5)
RBC # BLD AUTO: 3.74 M/UL (ref 4.6–6.2)
RBC # BLD AUTO: 3.79 M/UL (ref 4.6–6.2)
RBC # BLD AUTO: 3.84 M/UL (ref 4.6–6.2)
RBC # BLD AUTO: 3.9 M/UL (ref 4.6–6.2)
RBC # BLD AUTO: 4 M/UL (ref 4.6–6.2)
RBC # BLD AUTO: 4.03 M/UL (ref 4.6–6.2)
SAMPLE: ABNORMAL
SITE: ABNORMAL
SODIUM BLD-SCNC: 130 MMOL/L (ref 136–145)
SODIUM BLD-SCNC: 130 MMOL/L (ref 136–145)
SODIUM BLD-SCNC: 137 MMOL/L (ref 136–145)
SODIUM SERPL-SCNC: 135 MMOL/L (ref 136–145)
SODIUM SERPL-SCNC: 136 MMOL/L (ref 136–145)
SODIUM SERPL-SCNC: 136 MMOL/L (ref 136–145)
SODIUM SERPL-SCNC: 137 MMOL/L (ref 136–145)
SODIUM SERPL-SCNC: 138 MMOL/L (ref 136–145)
SODIUM SERPL-SCNC: 138 MMOL/L (ref 136–145)
VANCOMYCIN SERPL-MCNC: 10.9 UG/ML
VT: 450
VT: 450
WBC # BLD AUTO: 13.76 K/UL (ref 3.9–12.7)
WBC # BLD AUTO: 15.71 K/UL (ref 3.9–12.7)
WBC # BLD AUTO: 17.32 K/UL (ref 3.9–12.7)
WBC # BLD AUTO: 18.27 K/UL (ref 3.9–12.7)
WBC # BLD AUTO: 18.33 K/UL (ref 3.9–12.7)
WBC # BLD AUTO: 18.52 K/UL (ref 3.9–12.7)

## 2020-02-07 PROCEDURE — 36000713 HC OR TIME LEV V EA ADD 15 MIN: Performed by: THORACIC SURGERY (CARDIOTHORACIC VASCULAR SURGERY)

## 2020-02-07 PROCEDURE — 85610 PROTHROMBIN TIME: CPT | Mod: 91

## 2020-02-07 PROCEDURE — C1729 CATH, DRAINAGE: HCPCS | Performed by: THORACIC SURGERY (CARDIOTHORACIC VASCULAR SURGERY)

## 2020-02-07 PROCEDURE — 63600175 PHARM REV CODE 636 W HCPCS: Performed by: STUDENT IN AN ORGANIZED HEALTH CARE EDUCATION/TRAINING PROGRAM

## 2020-02-07 PROCEDURE — C1768 GRAFT, VASCULAR: HCPCS | Performed by: THORACIC SURGERY (CARDIOTHORACIC VASCULAR SURGERY)

## 2020-02-07 PROCEDURE — 20000000 HC ICU ROOM

## 2020-02-07 PROCEDURE — 84132 ASSAY OF SERUM POTASSIUM: CPT

## 2020-02-07 PROCEDURE — 99291 CRITICAL CARE FIRST HOUR: CPT | Mod: GC,,, | Performed by: SURGERY

## 2020-02-07 PROCEDURE — 80076 HEPATIC FUNCTION PANEL: CPT

## 2020-02-07 PROCEDURE — 97803 MED NUTRITION INDIV SUBSEQ: CPT

## 2020-02-07 PROCEDURE — D9220A PRA ANESTHESIA: ICD-10-PCS | Mod: ,,, | Performed by: ANESTHESIOLOGY

## 2020-02-07 PROCEDURE — 93750 PR INTERROGATE VENT ASSIST DEV, IN PERSON, W PHYSICIAN ANALYSIS: ICD-10-PCS | Mod: ,,, | Performed by: INTERNAL MEDICINE

## 2020-02-07 PROCEDURE — 27201423 OPTIME MED/SURG SUP & DEVICES STERILE SUPPLY: Performed by: THORACIC SURGERY (CARDIOTHORACIC VASCULAR SURGERY)

## 2020-02-07 PROCEDURE — 37000008 HC ANESTHESIA 1ST 15 MINUTES: Performed by: THORACIC SURGERY (CARDIOTHORACIC VASCULAR SURGERY)

## 2020-02-07 PROCEDURE — 80202 ASSAY OF VANCOMYCIN: CPT

## 2020-02-07 PROCEDURE — 27000248 HC VAD-ADDITIONAL DAY

## 2020-02-07 PROCEDURE — 84295 ASSAY OF SERUM SODIUM: CPT

## 2020-02-07 PROCEDURE — 99291 PR CRITICAL CARE, E/M 30-74 MINUTES: ICD-10-PCS | Mod: GC,,, | Performed by: SURGERY

## 2020-02-07 PROCEDURE — 21750 PR CLOSE MED STERNOTOMY SEP, W/WO DEBRIDE: ICD-10-PCS | Mod: 58,,, | Performed by: THORACIC SURGERY (CARDIOTHORACIC VASCULAR SURGERY)

## 2020-02-07 PROCEDURE — 94003 VENT MGMT INPAT SUBQ DAY: CPT

## 2020-02-07 PROCEDURE — 63600175 PHARM REV CODE 636 W HCPCS

## 2020-02-07 PROCEDURE — 93312 ECHO TRANSESOPHAGEAL: CPT | Mod: 26,59,, | Performed by: ANESTHESIOLOGY

## 2020-02-07 PROCEDURE — 37799 UNLISTED PX VASCULAR SURGERY: CPT

## 2020-02-07 PROCEDURE — 25000003 PHARM REV CODE 250: Performed by: STUDENT IN AN ORGANIZED HEALTH CARE EDUCATION/TRAINING PROGRAM

## 2020-02-07 PROCEDURE — 27000239 HC STAND-BY BYPASS PUMP

## 2020-02-07 PROCEDURE — 83615 LACTATE (LD) (LDH) ENZYME: CPT

## 2020-02-07 PROCEDURE — 99900035 HC TECH TIME PER 15 MIN (STAT)

## 2020-02-07 PROCEDURE — 85730 THROMBOPLASTIN TIME PARTIAL: CPT

## 2020-02-07 PROCEDURE — 63600175 PHARM REV CODE 636 W HCPCS: Performed by: THORACIC SURGERY (CARDIOTHORACIC VASCULAR SURGERY)

## 2020-02-07 PROCEDURE — 82803 BLOOD GASES ANY COMBINATION: CPT

## 2020-02-07 PROCEDURE — 93750 INTERROGATION VAD IN PERSON: CPT | Mod: ,,, | Performed by: INTERNAL MEDICINE

## 2020-02-07 PROCEDURE — 82330 ASSAY OF CALCIUM: CPT

## 2020-02-07 PROCEDURE — 25000003 PHARM REV CODE 250: Performed by: THORACIC SURGERY (CARDIOTHORACIC VASCULAR SURGERY)

## 2020-02-07 PROCEDURE — 21750 REPAIR OF STERNUM SEPARATION: CPT | Mod: 58,,, | Performed by: THORACIC SURGERY (CARDIOTHORACIC VASCULAR SURGERY)

## 2020-02-07 PROCEDURE — 80048 BASIC METABOLIC PNL TOTAL CA: CPT

## 2020-02-07 PROCEDURE — 85025 COMPLETE CBC W/AUTO DIFF WBC: CPT | Mod: 91

## 2020-02-07 PROCEDURE — 94761 N-INVAS EAR/PLS OXIMETRY MLT: CPT

## 2020-02-07 PROCEDURE — 99233 SBSQ HOSP IP/OBS HIGH 50: CPT | Mod: ,,, | Performed by: NURSE PRACTITIONER

## 2020-02-07 PROCEDURE — 63600175 PHARM REV CODE 636 W HCPCS: Performed by: SURGERY

## 2020-02-07 PROCEDURE — C9113 INJ PANTOPRAZOLE SODIUM, VIA: HCPCS | Performed by: STUDENT IN AN ORGANIZED HEALTH CARE EDUCATION/TRAINING PROGRAM

## 2020-02-07 PROCEDURE — 93312 TEE: ICD-10-PCS | Mod: 26,59,, | Performed by: ANESTHESIOLOGY

## 2020-02-07 PROCEDURE — 27000221 HC OXYGEN, UP TO 24 HOURS

## 2020-02-07 PROCEDURE — D9220A PRA ANESTHESIA: Mod: ,,, | Performed by: ANESTHESIOLOGY

## 2020-02-07 PROCEDURE — 83050 HGB METHEMOGLOBIN QUAN: CPT

## 2020-02-07 PROCEDURE — 99900026 HC AIRWAY MAINTENANCE (STAT)

## 2020-02-07 PROCEDURE — 36000712 HC OR TIME LEV V 1ST 15 MIN: Performed by: THORACIC SURGERY (CARDIOTHORACIC VASCULAR SURGERY)

## 2020-02-07 PROCEDURE — 63600367 HC NITRIC OXIDE PER HOUR

## 2020-02-07 PROCEDURE — 84134 ASSAY OF PREALBUMIN: CPT

## 2020-02-07 PROCEDURE — 99233 PR SUBSEQUENT HOSPITAL CARE,LEVL III: ICD-10-PCS | Mod: ,,, | Performed by: NURSE PRACTITIONER

## 2020-02-07 PROCEDURE — 37000009 HC ANESTHESIA EA ADD 15 MINS: Performed by: THORACIC SURGERY (CARDIOTHORACIC VASCULAR SURGERY)

## 2020-02-07 PROCEDURE — 25000003 PHARM REV CODE 250: Performed by: SURGERY

## 2020-02-07 PROCEDURE — 85014 HEMATOCRIT: CPT

## 2020-02-07 PROCEDURE — 27200966 HC CLOSED SUCTION SYSTEM

## 2020-02-07 PROCEDURE — 83605 ASSAY OF LACTIC ACID: CPT

## 2020-02-07 DEVICE — MEMBRANE PERICARDIAL 15X20CM: Type: IMPLANTABLE DEVICE | Site: CHEST  WALL | Status: FUNCTIONAL

## 2020-02-07 RX ORDER — GLYCOPYRROLATE 0.2 MG/ML
INJECTION INTRAMUSCULAR; INTRAVENOUS
Status: DISCONTINUED | OUTPATIENT
Start: 2020-02-07 | End: 2020-02-07

## 2020-02-07 RX ORDER — OXYCODONE HYDROCHLORIDE 10 MG/1
10 TABLET ORAL EVERY 4 HOURS PRN
Status: DISCONTINUED | OUTPATIENT
Start: 2020-02-07 | End: 2020-02-13

## 2020-02-07 RX ORDER — ALBUTEROL SULFATE 2.5 MG/.5ML
2.5 SOLUTION RESPIRATORY (INHALATION) EVERY 4 HOURS PRN
Status: DISCONTINUED | OUTPATIENT
Start: 2020-02-07 | End: 2020-02-27 | Stop reason: HOSPADM

## 2020-02-07 RX ORDER — ASPIRIN 325 MG
325 TABLET ORAL DAILY
Status: DISCONTINUED | OUTPATIENT
Start: 2020-02-07 | End: 2020-02-27 | Stop reason: HOSPADM

## 2020-02-07 RX ORDER — NEOSTIGMINE METHYLSULFATE 0.5 MG/ML
INJECTION, SOLUTION INTRAVENOUS
Status: DISCONTINUED | OUTPATIENT
Start: 2020-02-07 | End: 2020-02-07

## 2020-02-07 RX ORDER — ROCURONIUM BROMIDE 10 MG/ML
INJECTION, SOLUTION INTRAVENOUS
Status: DISCONTINUED | OUTPATIENT
Start: 2020-02-07 | End: 2020-02-07

## 2020-02-07 RX ORDER — HEPARIN SODIUM 10000 [USP'U]/100ML
200 INJECTION, SOLUTION INTRAVENOUS CONTINUOUS
Status: DISCONTINUED | OUTPATIENT
Start: 2020-02-07 | End: 2020-02-08

## 2020-02-07 RX ORDER — FUROSEMIDE 10 MG/ML
10 INJECTION INTRAMUSCULAR; INTRAVENOUS ONCE
Status: COMPLETED | OUTPATIENT
Start: 2020-02-07 | End: 2020-02-07

## 2020-02-07 RX ORDER — SODIUM CHLORIDE 9 MG/ML
INJECTION, SOLUTION INTRAVENOUS CONTINUOUS
Status: DISCONTINUED | OUTPATIENT
Start: 2020-02-07 | End: 2020-02-15

## 2020-02-07 RX ORDER — DEXTROSE MONOHYDRATE, SODIUM CHLORIDE, AND POTASSIUM CHLORIDE 50; 2.98; 4.5 G/1000ML; G/1000ML; G/1000ML
INJECTION, SOLUTION INTRAVENOUS CONTINUOUS
Status: DISCONTINUED | OUTPATIENT
Start: 2020-02-07 | End: 2020-02-15

## 2020-02-07 RX ORDER — WARFARIN 2 MG/1
2 TABLET ORAL ONCE
Status: COMPLETED | OUTPATIENT
Start: 2020-02-08 | End: 2020-02-08

## 2020-02-07 RX ORDER — ALBUMIN HUMAN 50 G/1000ML
500 SOLUTION INTRAVENOUS
Status: DISCONTINUED | OUTPATIENT
Start: 2020-02-07 | End: 2020-02-18

## 2020-02-07 RX ORDER — POTASSIUM CHLORIDE 14.9 MG/ML
40 INJECTION INTRAVENOUS
Status: DISCONTINUED | OUTPATIENT
Start: 2020-02-07 | End: 2020-02-10

## 2020-02-07 RX ORDER — FUROSEMIDE 10 MG/ML
INJECTION INTRAMUSCULAR; INTRAVENOUS
Status: COMPLETED
Start: 2020-02-07 | End: 2020-02-07

## 2020-02-07 RX ORDER — ADHESIVE BANDAGE
30 BANDAGE TOPICAL DAILY PRN
Status: DISCONTINUED | OUTPATIENT
Start: 2020-02-07 | End: 2020-02-27 | Stop reason: HOSPADM

## 2020-02-07 RX ORDER — FERROUS GLUCONATE 324(37.5)
324 TABLET ORAL
Status: DISCONTINUED | OUTPATIENT
Start: 2020-02-08 | End: 2020-02-20

## 2020-02-07 RX ORDER — MUPIROCIN 20 MG/G
OINTMENT TOPICAL 2 TIMES DAILY
Status: COMPLETED | OUTPATIENT
Start: 2020-02-07 | End: 2020-02-12

## 2020-02-07 RX ORDER — FENTANYL CITRATE 50 UG/ML
25 INJECTION, SOLUTION INTRAMUSCULAR; INTRAVENOUS
Status: DISCONTINUED | OUTPATIENT
Start: 2020-02-07 | End: 2020-02-11

## 2020-02-07 RX ORDER — BACITRACIN 50000 [IU]/1
INJECTION, POWDER, FOR SOLUTION INTRAMUSCULAR
Status: DISCONTINUED | OUTPATIENT
Start: 2020-02-07 | End: 2020-02-07 | Stop reason: HOSPADM

## 2020-02-07 RX ORDER — SODIUM CHLORIDE 0.9 % (FLUSH) 0.9 %
10 SYRINGE (ML) INJECTION
Status: DISCONTINUED | OUTPATIENT
Start: 2020-02-07 | End: 2020-02-17

## 2020-02-07 RX ORDER — FENTANYL CITRATE 50 UG/ML
INJECTION, SOLUTION INTRAMUSCULAR; INTRAVENOUS
Status: DISCONTINUED | OUTPATIENT
Start: 2020-02-07 | End: 2020-02-07

## 2020-02-07 RX ORDER — MAGNESIUM SULFATE HEPTAHYDRATE 40 MG/ML
2 INJECTION, SOLUTION INTRAVENOUS
Status: DISCONTINUED | OUTPATIENT
Start: 2020-02-07 | End: 2020-02-17

## 2020-02-07 RX ORDER — MIDAZOLAM HYDROCHLORIDE 1 MG/ML
INJECTION, SOLUTION INTRAMUSCULAR; INTRAVENOUS
Status: DISCONTINUED | OUTPATIENT
Start: 2020-02-07 | End: 2020-02-07

## 2020-02-07 RX ORDER — OXYCODONE HYDROCHLORIDE 5 MG/1
5 TABLET ORAL EVERY 4 HOURS PRN
Status: DISCONTINUED | OUTPATIENT
Start: 2020-02-07 | End: 2020-02-13

## 2020-02-07 RX ORDER — ASPIRIN 325 MG
325 TABLET, DELAYED RELEASE (ENTERIC COATED) ORAL DAILY
Status: DISCONTINUED | OUTPATIENT
Start: 2020-02-08 | End: 2020-02-09

## 2020-02-07 RX ORDER — ALBUTEROL SULFATE 2.5 MG/.5ML
2.5 SOLUTION RESPIRATORY (INHALATION) EVERY 4 HOURS PRN
Status: DISCONTINUED | OUTPATIENT
Start: 2020-02-07 | End: 2020-02-07

## 2020-02-07 RX ORDER — BISACODYL 10 MG
10 SUPPOSITORY, RECTAL RECTAL DAILY PRN
Status: DISCONTINUED | OUTPATIENT
Start: 2020-02-07 | End: 2020-02-27 | Stop reason: HOSPADM

## 2020-02-07 RX ADMIN — FUROSEMIDE 10 MG/HR: 10 INJECTION, SOLUTION INTRAMUSCULAR; INTRAVENOUS at 09:02

## 2020-02-07 RX ADMIN — FUROSEMIDE 10 MG: 10 INJECTION, SOLUTION INTRAMUSCULAR; INTRAVENOUS at 11:02

## 2020-02-07 RX ADMIN — HYDRALAZINE HYDROCHLORIDE 10 MG: 20 INJECTION INTRAMUSCULAR; INTRAVENOUS at 11:02

## 2020-02-07 RX ADMIN — NICARDIPINE HYDROCHLORIDE 5 MG/HR: 0.2 INJECTION, SOLUTION INTRAVENOUS at 03:02

## 2020-02-07 RX ADMIN — MUPIROCIN: 20 OINTMENT TOPICAL at 08:02

## 2020-02-07 RX ADMIN — CEFEPIME 2 G: 2 INJECTION, POWDER, FOR SOLUTION INTRAVENOUS at 07:02

## 2020-02-07 RX ADMIN — NEOSTIGMINE METHYLSULFATE 5 MG: 0.5 INJECTION INTRAVENOUS at 01:02

## 2020-02-07 RX ADMIN — CEFEPIME 2 G: 2 INJECTION, POWDER, FOR SOLUTION INTRAVENOUS at 04:02

## 2020-02-07 RX ADMIN — DOBUTAMINE IN DEXTROSE 5 MCG/KG/MIN: 200 INJECTION, SOLUTION INTRAVENOUS at 10:02

## 2020-02-07 RX ADMIN — PROPOFOL 40 MCG/KG/MIN: 10 INJECTION, EMULSION INTRAVENOUS at 06:02

## 2020-02-07 RX ADMIN — EPINEPHRINE 0.1 MCG/KG/MIN: 1 INJECTION INTRAMUSCULAR; INTRAVENOUS; SUBCUTANEOUS at 04:02

## 2020-02-07 RX ADMIN — PANTOPRAZOLE SODIUM 40 MG: 40 INJECTION, POWDER, FOR SOLUTION INTRAVENOUS at 08:02

## 2020-02-07 RX ADMIN — DEXTROSE MONOHYDRATE, SODIUM CHLORIDE, AND POTASSIUM CHLORIDE: 50; 4.5; 2.98 INJECTION, SOLUTION INTRAVENOUS at 04:02

## 2020-02-07 RX ADMIN — ASPIRIN 325 MG ORAL TABLET 325 MG: 325 PILL ORAL at 07:02

## 2020-02-07 RX ADMIN — PROPOFOL 40 MCG/KG/MIN: 10 INJECTION, EMULSION INTRAVENOUS at 07:02

## 2020-02-07 RX ADMIN — EPINEPHRINE 0.09 MCG/KG/MIN: 1 INJECTION INTRAMUSCULAR; INTRAVENOUS; SUBCUTANEOUS at 02:02

## 2020-02-07 RX ADMIN — FUROSEMIDE 10 MG: 10 INJECTION INTRAMUSCULAR; INTRAVENOUS at 08:02

## 2020-02-07 RX ADMIN — MIDAZOLAM HYDROCHLORIDE 1 MG: 1 INJECTION, SOLUTION INTRAMUSCULAR; INTRAVENOUS at 11:02

## 2020-02-07 RX ADMIN — FENTANYL CITRATE 25 MCG: 50 INJECTION INTRAMUSCULAR; INTRAVENOUS at 03:02

## 2020-02-07 RX ADMIN — MUPIROCIN: 20 OINTMENT TOPICAL at 09:02

## 2020-02-07 RX ADMIN — SODIUM CHLORIDE, SODIUM GLUCONATE, SODIUM ACETATE, POTASSIUM CHLORIDE, MAGNESIUM CHLORIDE, SODIUM PHOSPHATE, DIBASIC, AND POTASSIUM PHOSPHATE: .53; .5; .37; .037; .03; .012; .00082 INJECTION, SOLUTION INTRAVENOUS at 11:02

## 2020-02-07 RX ADMIN — HEPARIN SODIUM AND DEXTROSE 200 UNITS/HR: 10000; 5 INJECTION INTRAVENOUS at 10:02

## 2020-02-07 RX ADMIN — CEFEPIME 2 G: 2 INJECTION, POWDER, FOR SOLUTION INTRAVENOUS at 12:02

## 2020-02-07 RX ADMIN — DOBUTAMINE IN DEXTROSE 5 MCG/KG/MIN: 200 INJECTION, SOLUTION INTRAVENOUS at 06:02

## 2020-02-07 RX ADMIN — NICARDIPINE HYDROCHLORIDE 2.5 MG/HR: 0.2 INJECTION, SOLUTION INTRAVENOUS at 02:02

## 2020-02-07 RX ADMIN — SODIUM CHLORIDE: 0.9 INJECTION, SOLUTION INTRAVENOUS at 04:02

## 2020-02-07 RX ADMIN — PROPOFOL 40 MCG/KG/MIN: 10 INJECTION, EMULSION INTRAVENOUS at 02:02

## 2020-02-07 RX ADMIN — FENTANYL CITRATE 50 MCG: 50 INJECTION INTRAMUSCULAR; INTRAVENOUS at 12:02

## 2020-02-07 RX ADMIN — ROCURONIUM BROMIDE 50 MG: 10 INJECTION, SOLUTION INTRAVENOUS at 11:02

## 2020-02-07 RX ADMIN — FUROSEMIDE 10 MG: 10 INJECTION, SOLUTION INTRAMUSCULAR; INTRAVENOUS at 08:02

## 2020-02-07 RX ADMIN — VANCOMYCIN HYDROCHLORIDE 1250 MG: 1.25 INJECTION, POWDER, LYOPHILIZED, FOR SOLUTION INTRAVENOUS at 09:02

## 2020-02-07 RX ADMIN — GLYCOPYRROLATE 0.6 MG: 0.2 INJECTION INTRAMUSCULAR; INTRAVENOUS at 01:02

## 2020-02-07 NOTE — SUBJECTIVE & OBJECTIVE
Interval History/Significant Events: POD 1 from LVAD placement. Chest open. No acute events overnight. Patient received 500 albumin post op. Tried to wean Radha to 15 overnight but CVP elevated to 18 and Radha increased back to 20. Epi at 0.1,  5, Cardene 5, Radha 20    Follow-up For: Procedure(s) (LRB):  CLOSURE, WOUND, STERNUM (N/A)  INSERTION-RIGHT VENTRICULAR ASSIST DEVICE (Right)    Post-Operative Day: Day of Surgery    Objective:     Vital Signs (Most Recent):  Temp: 98.1 °F (36.7 °C) (02/07/20 0300)  Pulse: 98 (02/07/20 1102)  Resp: 19 (02/07/20 1102)  BP: (!) 82/0 (02/07/20 0730)  SpO2: 100 % (02/07/20 0730) Vital Signs (24h Range):  Temp:  [97.8 °F (36.6 °C)-98.2 °F (36.8 °C)] 98.1 °F (36.7 °C)  Pulse:  [] 98  Resp:  [15-24] 19  SpO2:  [97 %-100 %] 100 %  BP: (78-82)/(0) 82/0  Arterial Line BP: ()/(58-80) 92/78     Weight: 86.4 kg (190 lb 7.6 oz)  Body mass index is 29.83 kg/m².      Intake/Output Summary (Last 24 hours) at 2/7/2020 1200  Last data filed at 2/7/2020 1100  Gross per 24 hour   Intake 2645.6 ml   Output 2950 ml   Net -304.4 ml       Physical Exam   Constitutional: He appears well-developed and well-nourished.   HENT:   Head: Normocephalic and atraumatic.   Intubated   Cardiovascular: Normal rate and regular rhythm.   LVAD in place  Right and left mediastinal chest tubes   Pulmonary/Chest:   Intubated   Abdominal: Soft.   Genitourinary:   Genitourinary Comments: Mc cath in place   Neurological:   sedated   Skin: Skin is warm and dry.   Nursing note and vitals reviewed.      Vents:  Vent Mode: A/C (02/07/20 0903)  Ventilator Initiated: Yes (02/06/20 1251)  Set Rate: 16 BPM (02/07/20 0903)  Vt Set: 450 mL (02/07/20 0903)  PEEP/CPAP: 5 cmH20 (02/07/20 0903)  Oxygen Concentration (%): 40 (02/07/20 1102)  Peak Airway Pressure: 25 cmH2O (02/07/20 0903)  Plateau Pressure: 0 cmH20 (02/07/20 0903)  Total Ve: 8.98 mL (02/07/20 0903)  F/VT Ratio<105 (RSBI): (!) 43.2 (02/07/20  0903)    Lines/Drains/Airways     Central Venous Catheter Line                 Percutaneous Central Line Insertion/Assessment - triple lumen  02/03/20 1535 right internal jugular 3 days         Percutaneous Central Line Insertion/Assessment - Quad lumen  02/06/20 0742 1 day         Percutaneous Central Line Insertion/Assessment - quad lumen  02/06/20 0742 1 day         Pulmonary Artery Catheter Assessment  02/06/20 0746 1 day          Drain                 Urethral Catheter 02/06/20 0736 Non-latex;Temperature probe 14 Fr. 1 day         Chest Tube 02/06/20 1250 Left Mediastinal less than 1 day         Chest Tube 02/06/20 1250 Right Mediastinal less than 1 day          Airway                 Airway - Non-Surgical 02/06/20 0848 1 day          Arterial Line                 Arterial Line 02/06/20 0736 Left Other (Comment) 1 day          Line                 VAD 02/06/20 1047 Left ventricular assist device HeartMate 3 1 day                Significant Labs:    CBC/Anemia Profile:  Recent Labs   Lab 02/06/20  2348 02/07/20  0405 02/07/20  0800   WBC 13.76* 15.71* 17.32*   HGB 10.5* 10.4* 10.4*   HCT 31.3* 33.1* 32.5*    236 236   MCV 80* 82 81*   RDW 18.3* 18.4* 18.4*        Chemistries:  Recent Labs   Lab 02/06/20  0334 02/06/20  1248 02/06/20  1619  02/06/20  2348 02/06/20  2354 02/07/20  0405 02/07/20  0800   *  133* 134*  --    < > 135*  --  136 136   K 4.0  4.0  4.0 3.4* 4.1   < > 4.5  --  4.6 4.5   CL 85*  85* 93*  --    < > 99  --  99 101   CO2 33*  33* 26  --    < > 26  --  25 22*   BUN 47*  47* 41*  --    < > 37*  --  34* 33*   CREATININE 1.7*  1.7* 1.5*  --    < > 1.4  --  1.3 1.3   CALCIUM 10.8*  10.8* 11.2*  --    < > 10.4  --  10.5 10.2   ALBUMIN 3.9 3.1*  --   --   --   --  3.5  --    PROT 9.4* 7.3  --   --   --   --  7.6  --    BILITOT 1.3* 1.8*  --   --   --   --  1.8*  --    ALKPHOS 154* 113  --   --   --   --  102  --    ALT 26 22  --   --   --   --  22  --    AST 25 72*  --   --   --    --  105*  --    MG 2.4 2.4 2.4  --   --  2.3  --   --    PHOS  --  4.7* 4.4  --   --   --   --   --     < > = values in this interval not displayed.       ABGs:   Recent Labs   Lab 02/07/20  0718   PH 7.488*   PCO2 32.0*   HCO3 24.2   POCSATURATED 100   BE 1     BMP:   Recent Labs   Lab 02/06/20  2354  02/07/20  0800   GLU  --    < > 156*   NA  --    < > 136   K  --    < > 4.5   CL  --    < > 101   CO2  --    < > 22*   BUN  --    < > 33*   CREATININE  --    < > 1.3   CALCIUM  --    < > 10.2   MG 2.3  --   --     < > = values in this interval not displayed.     CMP:   Recent Labs   Lab 02/06/20  0334 02/06/20  1248  02/06/20  2348 02/07/20  0405 02/07/20  0800   *  133* 134*   < > 135* 136 136   K 4.0  4.0  4.0 3.4*   < > 4.5 4.6 4.5   CL 85*  85* 93*   < > 99 99 101   CO2 33*  33* 26   < > 26 25 22*   *  117* 125*   < > 152* 131* 156*   BUN 47*  47* 41*   < > 37* 34* 33*   CREATININE 1.7*  1.7* 1.5*   < > 1.4 1.3 1.3   CALCIUM 10.8*  10.8* 11.2*   < > 10.4 10.5 10.2   PROT 9.4* 7.3  --   --  7.6  --    ALBUMIN 3.9 3.1*  --   --  3.5  --    BILITOT 1.3* 1.8*  --   --  1.8*  --    ALKPHOS 154* 113  --   --  102  --    AST 25 72*  --   --  105*  --    ALT 26 22  --   --  22  --    ANIONGAP 15  15 15   < > 10 12 13   EGFRNONAA 44.4*  44.4* 51.7*   < > 56.2* >60.0 >60.0    < > = values in this interval not displayed.     Coagulation:   Recent Labs   Lab 02/07/20  0800   INR 1.3*   APTT 27.3     Lactic Acid: No results for input(s): LACTATE in the last 48 hours.  All pertinent labs within the past 24 hours have been reviewed.    Significant Imaging:  I have reviewed all pertinent imaging results/findings within the past 24 hours.

## 2020-02-07 NOTE — ASSESSMENT & PLAN NOTE
-NICM; Likely Etoh induced  -Transferred from [facility] with IABP at 1:1 for further level of care. IABP removed 1/25 and replaced 2/3.   -S/P HMIII on 2/3.

## 2020-02-07 NOTE — TRANSFER OF CARE
"Anesthesia Transfer of Care Note    Patient: Saba Lott    Procedure(s) Performed: Procedure(s) (LRB):  CLOSURE, WOUND, STERNUM (N/A)  WASHOUT  INSERTION, GRAFT, PERICARDIUM  APPLICATION, WOUND VAC    Patient location: ICU    Anesthesia Type: general    Transport from OR: Transported from OR intubated on 100% O2 by AMBU with adequate controlled ventilation    Post pain: adequate analgesia    Post assessment: no apparent anesthetic complications and tolerated procedure well    Post vital signs: stable    Level of consciousness: sedated    Nausea/Vomiting: no nausea/vomiting    Complications: none    Transfer of care protocol was followed      Last vitals:   Visit Vitals  BP (!) 82/0 (BP Location: Right arm, Patient Position: Lying)   Pulse 96   Temp 36.5 °C (97.7 °F) (Oral)   Resp 18   Ht 5' 7" (1.702 m)   Wt 86.4 kg (190 lb 7.6 oz)   SpO2 100%   BMI 29.83 kg/m²     "

## 2020-02-07 NOTE — PROGRESS NOTES
[facility]  Heart Transplant  Progress Note    Patient Name: Saba Lott  MRN: [id number]  Admission Date: 1/15/2020  Hospital Length of Stay: 23 days  Attending Physician: David Joshi MD  Primary Care Provider: Primary Doctor No  Principal Problem:Presence of left ventricular assist device (LVAD)    Subjective:     Interval History: Intubated/sedated/ Chest open. No acute issues overnight.     Continuous Infusions:   DOBUTamine 5 mcg/kg/min (02/07/20 0600)    epinephrine 0.1 mcg/kg/min (02/07/20 0600)    insulin (HUMAN R) infusion (adults) 1.4 Units/hr (02/07/20 0600)    nicardipine 5 mg/hr (02/07/20 0600)    nitric oxide gas      propofol 40 mcg/kg/min (02/07/20 0600)     Scheduled Meds:   albumin human 5%  25 g Intravenous Once    ceFEPime (MAXIPIME) IVPB  2 g Intravenous Q8H    docusate sodium  100 mg Oral BID    mupirocin   Nasal BID    pantoprazole  40 mg Intravenous Daily    polyethylene glycol  17 g Oral Daily     PRN Meds:calcium gluconate IVPB, calcium gluconate IVPB, calcium gluconate IVPB, Dextrose 10% Bolus, Dextrose 10% Bolus, hydrALAZINE, magnesium sulfate IVPB, magnesium sulfate IVPB, morphine, potassium chloride in water **AND** potassium chloride in water **AND** potassium chloride in water, sodium phosphate IVPB, sodium phosphate IVPB, sodium phosphate IVPB    Review of patient's allergies indicates:   Allergen Reactions    Iodine and iodide containing products      Objective:     Vital Signs (Most Recent):  Temp: 98.1 °F (36.7 °C) (02/07/20 0300)  Pulse: 98 (02/07/20 0715)  Resp: 20 (02/07/20 0715)  BP: (!) 80/0 (02/07/20 0400)  SpO2: 100 % (02/07/20 0715) Vital Signs (24h Range):  Temp:  [97.8 °F (36.6 °C)-98.2 °F (36.8 °C)] 98.1 °F (36.7 °C)  Pulse:  [] 98  Resp:  [15-24] 20  SpO2:  [97 %-100 %] 100 %  BP: (78-82)/(0) 80/0  Arterial Line BP: ()/(58-80) 89/77     Patient Vitals for the past 72 hrs (Last 3 readings):   Weight   02/07/20 0429 86.4 kg  (190 lb 7.6 oz)   02/06/20 0300 84.6 kg (186 lb 8.2 oz)   02/05/20 1100 87.9 kg (193 lb 12.6 oz)     Body mass index is 29.83 kg/m².      Intake/Output Summary (Last 24 hours) at 2/7/2020 0741  Last data filed at 2/7/2020 0600  Gross per 24 hour   Intake 3745.6 ml   Output 3005 ml   Net 740.6 ml     PAP: (25-37)/(17-21) 30/20  PAP (Mean):  [21 mmHg-25 mmHg] 24 mmHg  Physical Exam   Constitutional: He appears well-developed and well-nourished. He is sedated and intubated.   HENT:   Head: Normocephalic and atraumatic.   Eyes: Pupils are equal, round, and reactive to light. EOM are normal.   Neck:   RIJ cordis/PA   LIJ Quad lumen   Cardiovascular: Normal rate and regular rhythm.   VAD hum.  Chest open.    Pulmonary/Chest: Effort normal and breath sounds normal. He is intubated. No respiratory distress. He has no wheezes. He has no rales.   Abdominal: Soft. Bowel sounds are normal. He exhibits distension. There is no tenderness.   Musculoskeletal: Normal range of motion.   Neurological: No cranial nerve deficit.   Skin: Skin is warm and dry. Capillary refill takes less than 2 seconds.     Significant Labs:  CBC:  Recent Labs   Lab 02/06/20 1927 02/06/20 2348 02/07/20  0405   WBC 13.73* 13.76* 15.71*   RBC 3.83* 3.90* 4.03*   HGB 10.2* 10.5* 10.4*   HCT 30.9* 31.3* 33.1*    204 236   MCV 81* 80* 82   MCH 26.6* 26.9* 25.8*   MCHC 33.0 33.5 31.4*     BNP:  Recent Labs   Lab 02/02/20  1511 02/05/20  0300 02/06/20  0334   BNP 3,069* 1,865* 1,420*     CMP:  Recent Labs   Lab 02/06/20  0334 02/06/20  1248  02/06/20 1927 02/06/20  2348 02/07/20  0405   *  117* 125*  --  158* 152* 131*   CALCIUM 10.8*  10.8* 11.2*  --  10.7* 10.4 10.5   ALBUMIN 3.9 3.1*  --   --   --  3.5   PROT 9.4* 7.3  --   --   --  7.6   *  133* 134*  --  136 135* 136   K 4.0  4.0  4.0 3.4*   < > 4.8 4.5 4.6   CO2 33*  33* 26  --  28 26 25   CL 85*  85* 93*  --  97 99 99   BUN 47*  47* 41*  --  38* 37* 34*   CREATININE 1.7*   1.7* 1.5*  --  1.5* 1.4 1.3   ALKPHOS 154* 113  --   --   --  102   ALT 26 22  --   --   --  22   AST 25 72*  --   --   --  105*   BILITOT 1.3* 1.8*  --   --   --  1.8*    < > = values in this interval not displayed.      Coagulation:   Recent Labs   Lab 02/06/20  1927 02/06/20  2348 02/07/20  0405   INR 1.3* 1.3* 1.3*   APTT 24.9 25.8 27.0     LDH:  Recent Labs   Lab 02/06/20  1250   *     Microbiology:  Microbiology Results (last 7 days)     Procedure Component Value Units Date/Time    Blood culture [[id number]] Collected:  02/03/20 0946    Order Status:  Completed Specimen:  Blood from Peripheral, Hand, Right Updated:  02/06/20 1022     Blood Culture, Routine No Growth to date      No Growth to date      No Growth to date      No Growth to date    Blood culture [[id number]] Collected:  02/03/20 0946    Order Status:  Completed Specimen:  Blood from Peripheral, Hand, Right Updated:  02/06/20 1022     Blood Culture, Routine No Growth to date      No Growth to date      No Growth to date      No Growth to date        I have reviewed all pertinent labs within the past 24 hours.    Estimated Creatinine Clearance: 67.4 mL/min (based on SCr of 1.3 mg/dL).    Diagnostic Results:  I have reviewed and interpreted all pertinent imaging results/findings within the past 24 hours.    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 Chronic MARC - Class II-III and mild LE edema.      Hospital Course (synopsis of major diagnoses, care, treatment, and services provided during the course of the hospital stay):  -2/12 admit to CDU for diuresis with IV lasix  -IV abx   -CXR with suspected small left pleural effusion with associated left basilar atelectasis versus evolving airspace disease  -2/13 single chamber ICD implant; IV lasix decreased  to BID  -2/16 add dobutamine, hold BB due to hypotension, no ACE-I or ARB due to recent HPI hypotension  -2/17 Lasix changed to PO  -2/18 dobutamine discontinued    Admitted to [facility] on Thursday due to severe SOB for a week with associated orthopnea, MARC, and PND unable to lie flat and found to be in acute diastolic heart failure. States he normally weighs around 219 but up to 254lb on admission tonight. Says he hasn't been the most compliant in terms of fluid restriction and daily weights but has avoided salt. BNP on admission was 2375. BUN/CRT 32/1.4 He was started on IV diuretics but continued to deteriorate. Creatinine continued to increase and reached 4.3 with oliguria. He was started on CRRT for a few days and tolerated well. Renal u/s was negative for obstruction and liver u/s without findings of cirrhosis. All of this was progression of cardiorenal syndrome with liver congestion from severe volume overload. On arrival vitals stable and in no acute distress. He has obvious anasarca up to the chest. He did have uop of 500 at time of arrival also.    TTE 5/28/19  · Moderate left ventricular enlargement.  · Severely decreased left ventricular systolic function. The estimated ejection fraction is 20%  · Global hypokinetic wall motion.  · Grade III (severe) left ventricular diastolic dysfunction consistent with restrictive physiology.  · Severe left atrial enlargement.  · Moderate right ventricular enlargement.  · Low normal right ventricular systolic function.  · Mild right atrial enlargement.  · Moderate mitral regurgitation.  Mild to moderate tricuspid regurgitation    * Presence of left ventricular assist device (LVAD)  -HeartMate 3 Implanted 02/06/2020 as DT. Chest open. Plan for possible closure today.   -CTS Primary.  -Implanted by Dr. Joshi.  -INR sub therapeutic Coumadin, Goal INR 2.0-3.0. Anticoag per CTS.   -Antiplatelets  mg.  -LDH is stable overall today. Will continue to monitor  daily.  -Continues on Epi, , Cardene, Radha.  -Speed set at 5100, LSL 4700 rpm.  -Not listed for OHTx.  Procedure: Device Interrogation Including analysis of device parameters.  Current Settings: Ventricular Assist Device.  Review of device function is stable/unstable stable.    TXP LVAD INTERROGATIONS 2/7/2020 2/7/2020 2/7/2020 2/7/2020 2/7/2020 2/7/2020 2/7/2020   Type HeartMate3 - - - HeartMate3 - -   Flow 4.2 4.3 4.4 4.4 4.3 4.3 4.2   Speed 5100 5100 5100 5100 5100 5100 5100   PI 3.7 3.3 2.8 3.0 3.9 3.7 3.7   Power (Centeno) 3.6 3.7 3.6 3.6 3.6 3.6 3.6   LSL - - - - 4700+ - -   Pulsatility - - - - No Pulse - -       ANJELICA (acute kidney injury)  -Creatinine was 3.0 on admit and got as high as 4.3 at OSH prior to transfer. Renal function was normal 8 months ago.  -Trending down today.     Acute hyperglycemia  -HgA1C 6.6  -Endocrine following    Moderate malnutrition  - Boost glucose control added 1/27   - Prealbumin is 23    Morbid obesity  -Weight down considerably since admit with diuresis.    Cardiogenic shock  -NICM; Likely Etoh induced  -Transferred from [facility] with IABP at 1:1 for further level of care. IABP removed 1/25 and replaced 2/3.   -S/P HMIII on 2/3.     Deep vein thrombosis (DVT) of right lower extremity  -Unsure of timing but was on eliquis PTA.   -Will anticoag with heparin/warfarin post VAD.     AICD (automatic cardioverter/defibrillator) present  -Medtronic single chamber ICD placed 2019 for primary prevention    Mickey Rider NP  Heart Transplant  [facility]

## 2020-02-07 NOTE — SUBJECTIVE & OBJECTIVE
Interval History: Intubated/sedated/ Chest open. No acute issues overnight.     Continuous Infusions:   DOBUTamine 5 mcg/kg/min (02/07/20 0600)    epinephrine 0.1 mcg/kg/min (02/07/20 0600)    insulin (HUMAN R) infusion (adults) 1.4 Units/hr (02/07/20 0600)    nicardipine 5 mg/hr (02/07/20 0600)    nitric oxide gas      propofol 40 mcg/kg/min (02/07/20 0600)     Scheduled Meds:   albumin human 5%  25 g Intravenous Once    ceFEPime (MAXIPIME) IVPB  2 g Intravenous Q8H    docusate sodium  100 mg Oral BID    mupirocin   Nasal BID    pantoprazole  40 mg Intravenous Daily    polyethylene glycol  17 g Oral Daily     PRN Meds:calcium gluconate IVPB, calcium gluconate IVPB, calcium gluconate IVPB, Dextrose 10% Bolus, Dextrose 10% Bolus, hydrALAZINE, magnesium sulfate IVPB, magnesium sulfate IVPB, morphine, potassium chloride in water **AND** potassium chloride in water **AND** potassium chloride in water, sodium phosphate IVPB, sodium phosphate IVPB, sodium phosphate IVPB    Review of patient's allergies indicates:   Allergen Reactions    Iodine and iodide containing products      Objective:     Vital Signs (Most Recent):  Temp: 98.1 °F (36.7 °C) (02/07/20 0300)  Pulse: 98 (02/07/20 0715)  Resp: 20 (02/07/20 0715)  BP: (!) 80/0 (02/07/20 0400)  SpO2: 100 % (02/07/20 0715) Vital Signs (24h Range):  Temp:  [97.8 °F (36.6 °C)-98.2 °F (36.8 °C)] 98.1 °F (36.7 °C)  Pulse:  [] 98  Resp:  [15-24] 20  SpO2:  [97 %-100 %] 100 %  BP: (78-82)/(0) 80/0  Arterial Line BP: ()/(58-80) 89/77     Patient Vitals for the past 72 hrs (Last 3 readings):   Weight   02/07/20 0429 86.4 kg (190 lb 7.6 oz)   02/06/20 0300 84.6 kg (186 lb 8.2 oz)   02/05/20 1100 87.9 kg (193 lb 12.6 oz)     Body mass index is 29.83 kg/m².      Intake/Output Summary (Last 24 hours) at 2/7/2020 0741  Last data filed at 2/7/2020 0600  Gross per 24 hour   Intake 3745.6 ml   Output 3005 ml   Net 740.6 ml     PAP: (25-37)/(17-21) 30/20  PAP (Mean):   [21 mmHg-25 mmHg] 24 mmHg  Physical Exam   Constitutional: He appears well-developed and well-nourished. He is sedated and intubated.   HENT:   Head: Normocephalic and atraumatic.   Eyes: Pupils are equal, round, and reactive to light. EOM are normal.   Neck:   RIJ cordis/PA   LIJ Quad lumen   Cardiovascular: Normal rate and regular rhythm.   VAD hum.  Chest open.    Pulmonary/Chest: Effort normal and breath sounds normal. He is intubated. No respiratory distress. He has no wheezes. He has no rales.   Abdominal: Soft. Bowel sounds are normal. He exhibits distension. There is no tenderness.   Musculoskeletal: Normal range of motion.   Neurological: No cranial nerve deficit.   Skin: Skin is warm and dry. Capillary refill takes less than 2 seconds.     Significant Labs:  CBC:  Recent Labs   Lab 02/06/20 1927 02/06/20 2348 02/07/20  0405   WBC 13.73* 13.76* 15.71*   RBC 3.83* 3.90* 4.03*   HGB 10.2* 10.5* 10.4*   HCT 30.9* 31.3* 33.1*    204 236   MCV 81* 80* 82   MCH 26.6* 26.9* 25.8*   MCHC 33.0 33.5 31.4*     BNP:  Recent Labs   Lab 02/02/20  1511 02/05/20  0300 02/06/20  0334   BNP 3,069* 1,865* 1,420*     CMP:  Recent Labs   Lab 02/06/20  0334 02/06/20  1248  02/06/20 1927 02/06/20  2348 02/07/20  0405   *  117* 125*  --  158* 152* 131*   CALCIUM 10.8*  10.8* 11.2*  --  10.7* 10.4 10.5   ALBUMIN 3.9 3.1*  --   --   --  3.5   PROT 9.4* 7.3  --   --   --  7.6   *  133* 134*  --  136 135* 136   K 4.0  4.0  4.0 3.4*   < > 4.8 4.5 4.6   CO2 33*  33* 26  --  28 26 25   CL 85*  85* 93*  --  97 99 99   BUN 47*  47* 41*  --  38* 37* 34*   CREATININE 1.7*  1.7* 1.5*  --  1.5* 1.4 1.3   ALKPHOS 154* 113  --   --   --  102   ALT 26 22  --   --   --  22   AST 25 72*  --   --   --  105*   BILITOT 1.3* 1.8*  --   --   --  1.8*    < > = values in this interval not displayed.      Coagulation:   Recent Labs   Lab 02/06/20  1927 02/06/20  2348 02/07/20  0405   INR 1.3* 1.3* 1.3*   APTT 24.9 25.8 27.0      LDH:  Recent Labs   Lab 02/06/20  1250   *     Microbiology:  Microbiology Results (last 7 days)     Procedure Component Value Units Date/Time    Blood culture [[id number]] Collected:  02/03/20 0946    Order Status:  Completed Specimen:  Blood from Peripheral, Hand, Right Updated:  02/06/20 1022     Blood Culture, Routine No Growth to date      No Growth to date      No Growth to date      No Growth to date    Blood culture [[id number]] Collected:  02/03/20 0946    Order Status:  Completed Specimen:  Blood from Peripheral, Hand, Right Updated:  02/06/20 1022     Blood Culture, Routine No Growth to date      No Growth to date      No Growth to date      No Growth to date        I have reviewed all pertinent labs within the past 24 hours.    Estimated Creatinine Clearance: 67.4 mL/min (based on SCr of 1.3 mg/dL).    Diagnostic Results:  I have reviewed and interpreted all pertinent imaging results/findings within the past 24 hours.

## 2020-02-07 NOTE — ANESTHESIA PROCEDURE NOTES
MACIEL    Diagnosis: Systolic dysfunction with acute on chronic heart failure ICD-10-CM: I50.23   Patient location during procedure: OR  Procedure start time: 2/7/2020 11:50 AM  Timeout: 2/7/2020 11:50 AM  Procedure end time: 2/7/2020 2:00 PM  Surgery related to: LVAD chest closure  Exam type: Baseline  Staffing  Anesthesiologist: Manuel Parrish MD  Performed: fellow and anesthesiologist   Preanesthetic Checklist  Completed: patient identified, surgical consent, pre-op evaluation, timeout performed, risks and benefits discussed, monitors and equipment checked, anesthesia consent given, oxygen available, suction available, hand hygiene performed and patient being monitored  Setup & Induction  Patient preparation: bite block inserted  Probe Insertion: easy  Exam: complete      Findings  Impression  Other Findings  PRE:  Severely depressed LV systolic function.  LVAD inflow/outflow cannula observed, laminar, low velocity flows.   Dilated RV, moderately depressed systolic function.  Septal bowing towards the LV in end-systole and during diastole.  No AI.  Mild MR, no MS  Mild TR. No PI.  Aorta intact, no dissection.  Left pleural effusion.    POST Chest Closure:  No ventricular or valvular changes, no effusions  Probe removed atraumatically  No changes in hear or valve anatomy or function.  Left pleural effusion no longer present.    Probe removed atraumatically.  Probe Removal      Exam     Left Heart  Left Atruim: dilated    Left Ventricle: cm, moderately abnormal (men 6.4-6.8; women 5.7-6.1)    LVAD:yes  Inflow Cannula and Direction: seen  Septum:D-shaped  Estimated Ejection Fraction: < 25% severe            Right Heart  Right Ventricle Function: moderately decreased  Right Atria: cm and mildly abnormal    Intra Atrial Septum  PFO: no shunt by color flow doppler  no IAS aneurysm  no lipomatous hypertrophy  no Atrial Septal Defect (Asd)    Right Ventricle    Aortic Valve:  Stenosis: none  Morphology:  trileaflet  Regurgitation: no aortic valve regurgitation     Mitral Valve:  Morphology:normal  Prolapse: none  Flail: no flail  Jet Description: mildStenosis: no significant stenosis.    Tricuspid Valve:  Morphology: normal  Regurgitation: mild    Pulmonic Valve:  Morphology:normal  Regurgitation(color flow): none    Great Vessels  Ascending Aorta Atherosclerosis: 1=nl-min dz  Aortic Arch Atherosclerosis: 1=nl-min dz  IABP: no  Descending Aorta Atherosclerosis: 1=nl-min dz  Aorta    Descending aorta IABP: no    Effusions  Effusions: left pleural    Summary  Findings discussed with surgeon.    Other Findings   PRE:  Severely depressed LV systolic function.  LVAD inflow/outflow cannula observed, laminar, low velocity flows.   Dilated RV, moderately depressed systolic function.  Septal bowing towards the LV in end-systole and during diastole.  No AI.  Mild MR, no MS  Mild TR. No PI.  Aorta intact, no dissection.  Left pleural effusion.    POST Chest Closure:  No ventricular or valvular changes, no effusions  Probe removed atraumatically  No changes in hear or valve anatomy or function.  Left pleural effusion no longer present.    Probe removed atraumatically.

## 2020-02-07 NOTE — ASSESSMENT & PLAN NOTE
-Creatinine was 3.0 on admit and got as high as 4.3 at OSH prior to transfer. Renal function was normal 8 months ago.  -Trending down today.

## 2020-02-07 NOTE — PLAN OF CARE
Recommendations  1. When able to extubate, ADAT to Cardiac with texture per SLP.   2. If unable to extubate, recommend initiating trickle TF of Peptamen Intense VHP.   RD to monitor.    Goals: Patient to receive nutrition by RD follow-up  Nutrition Goal Status: new    Full assessment completed, see RD Note 2/7/2020.

## 2020-02-07 NOTE — PLAN OF CARE
Pt vss, afebrile, sats 100% on AC 40/5, NO at 20ppm. Epi/Dobutamine/Cardene/insulin/propofol gtts infusing. MAP 65-85.  Accu check q1, insulin titrated per nomogram. HM3, speed 5100, no acute events throughout shift. IABP removed at bedside, krista well. Chesttube output, wnl. Urine output, >100cc/hr. Pt followed commands during sedation vacation. POC reviewed with mother, no questions/concerns at this time. Plan for chest closure tomorrow. Will continue to monitor.

## 2020-02-07 NOTE — PROGRESS NOTES
Patient with medtronic ICD scheduled for chest closure.  Tachy therapies turned OFF.  Please call [phone] once patient returns from procedure. Bedside RN aware.

## 2020-02-07 NOTE — PROGRESS NOTES
"[facility]  Adult Nutrition  Consult Note    SUMMARY       Recommendations  1. When able to extubate, ADAT to Cardiac with texture per SLP.   2. If unable to extubate, recommend initiating trickle TF of Peptamen Intense VHP.   RD to monitor.    Goals: Patient to receive nutrition by RD follow-up  Nutrition Goal Status: new  Communication of RD Recs: discussed on rounds    Reason for Assessment  Reason For Assessment: consult  Diagnosis: surgery/postoperative complications(s/p LVAD /)  Relevant Medical History: CHF, NICM, HTN  Interdisciplinary Rounds: attended  General Information Comments: POD#1 s/p LVAD placement, chest open. Intubated, sedated. Plan for takeback for chest closure today. NFPE completed , patient continues with moderate malnutriton. Noted significant weight loss since admission but likely fluid related loss since patient is -55.3L since admit.  Nutrition Discharge Planning: Unable to determine at this time.    Nutrition Risk Screen  Nutrition Risk Screen: no indicators present    Nutrition/Diet History  Patient Reported Diet/Restrictions/Preferences: general  Spiritual, Cultural Beliefs, Holiness Practices, Values that Affect Care: no  Factors Affecting Nutritional Intake: NPO, on mechanical ventilation    Anthropometrics  Temp: 98.1 °F (36.7 °C)  Height Method: Estimated  Height: 5' 7" (170.2 cm)  Height (inches): 67 in  Weight Method: Bed Scale  Weight: 86.4 kg (190 lb 7.6 oz)  Weight (lb): 190.48 lb  Ideal Body Weight (IBW), Male: 148 lb  % Ideal Body Weight, Male (lb): 130.94 %  BMI (Calculated): 29.8  BMI Grade: 25 - 29.9 - overweight  Usual Body Weight (UBW), k kg(admit weight)  % Usual Body Weight: 75.95  % Weight Change From Usual Weight: -24.21 %    Lab/Procedures/Meds  Pertinent Labs Reviewed: reviewed  Pertinent Labs Comments: BUN 33, Glu 156  Pertinent Medications Reviewed: reviewed  Pertinent Medications Comments: docusate, lasix, pantoprazole, " dobutamine, epinephrine, lasix, insulin drip, cardene, propofol    Estimated/Assessed Needs  Weight Used For Calorie Calculations: 86.4 kg (190 lb 7.6 oz)  Energy Calorie Requirements (kcal): 1797 kcal/day  Energy Need Method: [facility]  Protein Requirements: 104-121 g/day(1.2-1.4 g/kg)  Weight Used For Protein Calculations: 86.4 kg (190 lb 7.6 oz)  Fluid Requirements (mL): 1 mL/kcal or per MD  Estimated Fluid Requirement Method: RDA Method  RDA Method (mL): 1797    Nutrition Prescription Ordered  Current Diet Order: NPO    Evaluation of Received Nutrient/Fluid Intake  Other Calories (kcal): 631(propofol)  I/O: -55.3L since admit  Comments: LBM 2/4  % Intake of Estimated Energy Needs: 0 - 25 %  % Meal Intake: NPO    Nutrition Risk  Level of Risk/Frequency of Follow-up: high(2x/week)     Assessment and Plan  Moderate malnutrition  Nutrition Problem:  Moderate Protein-Calorie Malnutrition  Malnutrition in the context of Chronic Illness/Injury     Related to (etiology):  Lack of appetite in setting of chronic heart failure     Signs and Symptoms (as evidenced by):  Energy Intake: <75% of estimated energy requirement for > 3 months  Body Fat Depletion: mild and moderate depletion of orbitals and triceps   Muscle Mass Depletion: moderate depletion of interosseous muscle and lower extremities   Weight Loss: -24.2% x 1 month (mostly fluid related loss)   Fluid Accumulation: mild      Interventions(treatment strategy):  Collaboration of care with providers.     Nutrition Diagnosis Status:  Continues    Monitor and Evaluation  Food and Nutrient Intake: energy intake  Food and Nutrient Adminstration: diet order, enteral and parenteral nutrition administration  Knowledge/Beliefs/Attitudes: food and nutrition knowledge/skill  Anthropometric Measurements: weight, weight change  Biochemical Data, Medical Tests and Procedures: electrolyte and renal panel, gastrointestinal profile, inflammatory profile  Nutrition-Focused Physical  Findings: overall appearance     Malnutrition Assessment  Energy Intake (Malnutrition): less than 75% for greater than or equal to 3 months   Orbital Region (Subcutaneous Fat Loss): mild depletion  Upper Arm Region (Subcutaneous Fat Loss): moderate depletion  Thoracic and Lumbar Region: well nourished   [place] Region (Muscle Loss): well nourished  Clavicle Bone Region (Muscle Loss): well nourished  Clavicle and Acromion Bone Region (Muscle Loss): well nourished  Dorsal Hand (Muscle Loss): moderate depletion  Patellar Region (Muscle Loss): moderate depletion  Anterior Thigh Region (Muscle Loss): moderate depletion  Posterior Calf Region (Muscle Loss): moderate depletion     Nutrition Follow-Up  RD Follow-up?: Yes

## 2020-02-07 NOTE — ASSESSMENT & PLAN NOTE
-HeartMate 3 Implanted 02/06/2020 as DT. Chest open. Plan for possible closure today.   -CTS Primary.  -Implanted by Dr. Joshi.  -INR sub therapeutic Coumadin, Goal INR 2.0-3.0. Anticoag per CTS.   -Antiplatelets  mg.  -LDH is stable overall today. Will continue to monitor daily.  -Continues on Epi, , Cardene, Radha.  -Speed set at 5100, LSL 4700 rpm.  -Not listed for OHTx.  Procedure: Device Interrogation Including analysis of device parameters.  Current Settings: Ventricular Assist Device.  Review of device function is stable/unstable stable.    TXP LVAD INTERROGATIONS 2/7/2020 2/7/2020 2/7/2020 2/7/2020 2/7/2020 2/7/2020 2/7/2020   Type HeartMate3 - - - HeartMate3 - -   Flow 4.2 4.3 4.4 4.4 4.3 4.3 4.2   Speed 5100 5100 5100 5100 5100 5100 5100   PI 3.7 3.3 2.8 3.0 3.9 3.7 3.7   Power (Centeno) 3.6 3.7 3.6 3.6 3.6 3.6 3.6   LSL - - - - 4700+ - -   Pulsatility - - - - No Pulse - -

## 2020-02-07 NOTE — PLAN OF CARE
Pt remains intubated with Radha at 15-20 ppm, sedated lightly with propofol.  Wakes up during sedation vacation, follows commands and moves all extremities.  Epinephrine remains at 0.1 mg/kg/min, nicardipine titrated to maintain MAP<85, hydralazine administered per PRN dosing also for MAP>85.  Insulin gtt titrated per nomogram and q1h accuchecks and dobutamine remains at 5 mcg/kg/min.  LVAD speed 5100, flows 4.0-4.5, no alarms appreciated.  Adequate UOP and chest tube output as noted.  Unable to turn due to chest incision remaining open.  Pulses obtained via Doppler.  No family at bedside to discuss plan of care.

## 2020-02-07 NOTE — PROGRESS NOTES
Follow up note:    to see pt for follow up.    Worker attempted to see pt/family this morning.  Pt is not able to communicate with worker at this time and no family is currently in attendance.    Transplant  will continue to follow.

## 2020-02-07 NOTE — PLAN OF CARE
Plan of Care Note  Cardiothoracic Surgery    Saba Lott is a 54 y.o. male with heart failure who underwent LVAD placement (2/6/20).    Specific issues: on cardene, dobut, and epi; closure today    Plan of care for patient was discussed with ICU staff including nurses, residents, and faculty and appropriate consulting services.    Will continue to monitor patient's hemodynamics, functionality, neuro status, fluid status and renal function, and labs and will adjust medications and fluids as necessary while monitoring appropriateness for de-escalation of support and monitoring and transport to stepdown unit.    Benjy Street MD  Cardiothoracic Surgery Fellow

## 2020-02-07 NOTE — ASSESSMENT & PLAN NOTE
Plan:    Neuro:   -Pain control: prn Morphine  -Sedation propofol  -Daily sedation vacations    Pulmonary:   -intubated  Vent Mode: A/C  Oxygen Concentration (%):  [40-50] 40  Resp Rate Total:  [14 br/min-18 br/min] 16 br/min  Vt Set:  [450 mL] 450 mL  PEEP/CPAP:  [5 cmH20-6 cmH20] 5 cmH20  Mean Airway Pressure:  [10 lcK49-90 cmH20] 10 cmH20  -Daily SBT    Cardiac:  -MAP goal >65  -Epi 0.11,  5, cardene  -Radha     Renal:   -Mc in place  -UOP adequate  -Bun/Cr 47/1.7    Fluids/Electrolytes/Nutrition/GI:   -Nutritional status: NPO  -replace lytes PRN  -NGT to LIWS    Hematology/Oncology:  -H/H 10.4/31.0  -INR/Plts 1.4/223    Infectious Disease:   -Afebrile  -WBC 13.92  -Abx: post op Ancef    Endocrine:  -Glucose goal of 120-180  -Insulin gtt at 0.5    Dispo:  -Continue care in the ICU setting

## 2020-02-07 NOTE — ASSESSMENT & PLAN NOTE
Plan:    Neuro:   -Pain control: prn fentanyl 25 q1  -Sedation propofol  -Daily sedation vacations    Pulmonary:   -intubated  Vent Mode: A/C  Oxygen Concentration (%):  [40-50] 40  Resp Rate Total:  [14 br/min-30 br/min] 19 br/min  Vt Set:  [450 mL] 450 mL  PEEP/CPAP:  [5 cmH20-6 cmH20] 5 cmH20  Mean Airway Pressure:  [10 wkW60-02 cmH20] 10 cmH20  -Radha 20  -Daily SBT    Cardiac:  -MAP goal >65  -Epi 0.11,  5, cardene  -LVAD speed: 5100, LSL 4700 rpm  -LDH stable, continue to monitor     Renal:   -Mc in place  -UOP adequate  -Bun/Cr 34/1.3  -lasix gtt at 10    Fluids/Electrolytes/Nutrition/GI:   -Nutritional status: NPO  -replace lytes PRN  -NGT to LIWS  -bowel regimen: Miralax and docusate    Hematology/Oncology:  -H/H 10.4/33.1  -INR/Plts 1.3/236  -Trend CBC    Infectious Disease:   -Afebrile  -WBC 15.71  -Abx: vanc and ancef    Endocrine:  -Glucose goal of 120-180  -Insulin gtt at 0.5    Dispo:  -Continue care in the ICU setting. RTOR today for closure
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
## 2020-02-07 NOTE — PROGRESS NOTES
02/07/2020  Abelardo Sepulveda    Current provider:  David Joshi MD      I, Abelardo Sepulveda, rounded on Saba Lott to ensure all mechanical assist device settings (IABP or VAD) were appropriate and all parameters were within limits.  I was able to ensure all back up equipment was present, the staff had no issues, and the Perfusion Department daily rounding was complete.    9:37 AM

## 2020-02-07 NOTE — PLAN OF CARE
Pt vss, afebrile, sats 100% on AC 40/5, NO at 20ppm. Chest closed today. AICD on. Epi/Dobutamine/Cardene/lasix/insulin/propofol gtts infusing. MAP 65-85.  Accu check q1, insulin titrated per nomogram. HM3, speed 5200, no acute events throughout shift. Chesttube output, minimal, see I/os. Urine output, 75-100cc/hr. Pt followed commands during sedation vacation. POC reviewed with mother, no questions/concerns at this time. Will continue to monitor.

## 2020-02-07 NOTE — SUBJECTIVE & OBJECTIVE
"Interval HPI:    Overnight events:  BG is reasonably controlled overnight on intensive IV insulin infusion protocol. NAEON. Remains intubated. Hypotension noted. Expect increase insulin needs with pressor therapy.     No diet orders on file    Eating:   NPO  Nausea: No  Hypoglycemia and intervention: No  Fever: No  TPN and/or TF: No  If yes, type of TF/TPN and rate: None    BP (!) 82/0 (BP Location: Right arm, Patient Position: Lying)   Pulse 98   Temp 98.1 °F (36.7 °C) (Oral)   Resp 19   Ht 5' 7" (1.702 m)   Wt 86.4 kg (190 lb 7.6 oz)   SpO2 100%   BMI 29.83 kg/m²     Labs Reviewed and Include    Recent Labs   Lab 02/07/20  0405 02/07/20  0800   * 156*   CALCIUM 10.5 10.2   ALBUMIN 3.5  --    PROT 7.6  --     136   K 4.6 4.5   CO2 25 22*   CL 99 101   BUN 34* 33*   CREATININE 1.3 1.3   ALKPHOS 102  --    ALT 22  --    *  --    BILITOT 1.8*  --      Lab Results   Component Value Date    WBC 17.32 (H) 02/07/2020    HGB 10.4 (L) 02/07/2020    HCT 32.5 (L) 02/07/2020    MCV 81 (L) 02/07/2020     02/07/2020     No results for input(s): TSH, FREET4 in the last 168 hours.  Lab Results   Component Value Date    HGBA1C 6.6 (H) 01/16/2020       Nutritional status:   Body mass index is 29.83 kg/m².  Lab Results   Component Value Date    ALBUMIN 3.5 02/07/2020    ALBUMIN 3.1 (L) 02/06/2020    ALBUMIN 3.9 02/06/2020     Lab Results   Component Value Date    PREALBUMIN 23 01/28/2020    PREALBUMIN 15 (L) 01/22/2020    PREALBUMIN 17 (L) 07/23/2012       Estimated Creatinine Clearance: 67.4 mL/min (based on SCr of 1.3 mg/dL).    Accu-Checks  Recent Labs     02/07/20  0102 02/07/20  0205 02/07/20  0304 02/07/20  0402 02/07/20  0502 02/07/20  0603 02/07/20  0711 02/07/20  0806 02/07/20  0906 02/07/20  1004   POCTGLUCOSE 135* 140* 107 133* 148* 155* 147* 147* 133* 131*       Current Medications and/or Treatments Impacting Glycemic Control  Immunotherapy:    Immunosuppressants     None        Steroids: "   Hormones (From admission, onward)    None        Pressors:    Autonomic Drugs (From admission, onward)    Start     Stop Route Frequency Ordered    02/07/20 0300  EPINEPHrine (ADRENALIN) 5 mg in sodium chloride 0.9% 250 mL infusion     Question Answer Comment   Titrate by: (in mcg/kg/min) 0.12    Titrate interval: (in minutes) 5    Titrate to maintain: (SBP or MAP or Cardiac Index) MAP    Greater than: (in mmHg) 65    Maximum dose: (in mcg/kg/min) 2        -- IV Continuous 02/07/20 0254        Hyperglycemia/Diabetes Medications:   Antihyperglycemics (From admission, onward)    Start     Stop Route Frequency Ordered    02/06/20 1400  insulin regular 100 Units in sodium chloride 0.9% 100 mL infusion     Question:  Insulin rate changes (DO NOT MODIFY ANSWER)  Answer:  [email or web address]    -- IV Continuous 02/06/20 1302

## 2020-02-07 NOTE — PROGRESS NOTES
[facility]  Critical Care - Surgery  Progress Note    Patient Name: Saba Lott  MRN: [id number]  Admission Date: 1/15/2020  Hospital Length of Stay: 23 days  Code Status: Prior  Attending Provider: David Joshi MD  Primary Care Provider: Primary Doctor No   Principal Problem: Presence of left ventricular assist device (LVAD)    Subjective:     Hospital/ICU Course:  No notes on file    Interval History/Significant Events: POD 1 from LVAD placement. Chest open. No acute events overnight. Patient received 500 albumin post op. Tried to wean Radha to 15 overnight but CVP elevated to 18 and Radha increased back to 20. Epi at 0.1,  5, Cardene 5, Radha 20    Follow-up For: Procedure(s) (LRB):  CLOSURE, WOUND, STERNUM (N/A)  INSERTION-RIGHT VENTRICULAR ASSIST DEVICE (Right)    Post-Operative Day: Day of Surgery    Objective:     Vital Signs (Most Recent):  Temp: 98.1 °F (36.7 °C) (02/07/20 0300)  Pulse: 98 (02/07/20 1102)  Resp: 19 (02/07/20 1102)  BP: (!) 82/0 (02/07/20 0730)  SpO2: 100 % (02/07/20 0730) Vital Signs (24h Range):  Temp:  [97.8 °F (36.6 °C)-98.2 °F (36.8 °C)] 98.1 °F (36.7 °C)  Pulse:  [] 98  Resp:  [15-24] 19  SpO2:  [97 %-100 %] 100 %  BP: (78-82)/(0) 82/0  Arterial Line BP: ()/(58-80) 92/78     Weight: 86.4 kg (190 lb 7.6 oz)  Body mass index is 29.83 kg/m².      Intake/Output Summary (Last 24 hours) at 2/7/2020 1200  Last data filed at 2/7/2020 1100  Gross per 24 hour   Intake 2645.6 ml   Output 2950 ml   Net -304.4 ml       Physical Exam   Constitutional: He appears well-developed and well-nourished.   HENT:   Head: Normocephalic and atraumatic.   Intubated   Cardiovascular: Normal rate and regular rhythm.   LVAD in place  Right and left mediastinal chest tubes   Pulmonary/Chest:   Intubated   Abdominal: Soft.   Genitourinary:   Genitourinary Comments: Mc cath in place   Neurological:   sedated   Skin: Skin is warm and dry.   Nursing note and vitals  reviewed.      Vents:  Vent Mode: A/C (02/07/20 0903)  Ventilator Initiated: Yes (02/06/20 1251)  Set Rate: 16 BPM (02/07/20 0903)  Vt Set: 450 mL (02/07/20 0903)  PEEP/CPAP: 5 cmH20 (02/07/20 0903)  Oxygen Concentration (%): 40 (02/07/20 1102)  Peak Airway Pressure: 25 cmH2O (02/07/20 0903)  Plateau Pressure: 0 cmH20 (02/07/20 0903)  Total Ve: 8.98 mL (02/07/20 0903)  F/VT Ratio<105 (RSBI): (!) 43.2 (02/07/20 0903)    Lines/Drains/Airways     Central Venous Catheter Line                 Percutaneous Central Line Insertion/Assessment - triple lumen  02/03/20 1535 right internal jugular 3 days         Percutaneous Central Line Insertion/Assessment - Quad lumen  02/06/20 0742 1 day         Percutaneous Central Line Insertion/Assessment - quad lumen  02/06/20 0742 1 day         Pulmonary Artery Catheter Assessment  02/06/20 0746 1 day          Drain                 Urethral Catheter 02/06/20 0736 Non-latex;Temperature probe 14 Fr. 1 day         Chest Tube 02/06/20 1250 Left Mediastinal less than 1 day         Chest Tube 02/06/20 1250 Right Mediastinal less than 1 day          Airway                 Airway - Non-Surgical 02/06/20 0848 1 day          Arterial Line                 Arterial Line 02/06/20 0736 Left Other (Comment) 1 day          Line                 VAD 02/06/20 1047 Left ventricular assist device HeartMate 3 1 day                Significant Labs:    CBC/Anemia Profile:  Recent Labs   Lab 02/06/20  2348 02/07/20  0405 02/07/20  0800   WBC 13.76* 15.71* 17.32*   HGB 10.5* 10.4* 10.4*   HCT 31.3* 33.1* 32.5*    236 236   MCV 80* 82 81*   RDW 18.3* 18.4* 18.4*        Chemistries:  Recent Labs   Lab 02/06/20  0334 02/06/20  1248 02/06/20  1619  02/06/20  2348 02/06/20  2354 02/07/20  0405 02/07/20  0800   *  133* 134*  --    < > 135*  --  136 136   K 4.0  4.0  4.0 3.4* 4.1   < > 4.5  --  4.6 4.5   CL 85*  85* 93*  --    < > 99  --  99 101   CO2 33*  33* 26  --    < > 26  --  25 22*   BUN 47*   47* 41*  --    < > 37*  --  34* 33*   CREATININE 1.7*  1.7* 1.5*  --    < > 1.4  --  1.3 1.3   CALCIUM 10.8*  10.8* 11.2*  --    < > 10.4  --  10.5 10.2   ALBUMIN 3.9 3.1*  --   --   --   --  3.5  --    PROT 9.4* 7.3  --   --   --   --  7.6  --    BILITOT 1.3* 1.8*  --   --   --   --  1.8*  --    ALKPHOS 154* 113  --   --   --   --  102  --    ALT 26 22  --   --   --   --  22  --    AST 25 72*  --   --   --   --  105*  --    MG 2.4 2.4 2.4  --   --  2.3  --   --    PHOS  --  4.7* 4.4  --   --   --   --   --     < > = values in this interval not displayed.       ABGs:   Recent Labs   Lab 02/07/20  0718   PH 7.488*   PCO2 32.0*   HCO3 24.2   POCSATURATED 100   BE 1     BMP:   Recent Labs   Lab 02/06/20  2354  02/07/20  0800   GLU  --    < > 156*   NA  --    < > 136   K  --    < > 4.5   CL  --    < > 101   CO2  --    < > 22*   BUN  --    < > 33*   CREATININE  --    < > 1.3   CALCIUM  --    < > 10.2   MG 2.3  --   --     < > = values in this interval not displayed.     CMP:   Recent Labs   Lab 02/06/20  0334 02/06/20  1248  02/06/20  2348 02/07/20  0405 02/07/20  0800   *  133* 134*   < > 135* 136 136   K 4.0  4.0  4.0 3.4*   < > 4.5 4.6 4.5   CL 85*  85* 93*   < > 99 99 101   CO2 33*  33* 26   < > 26 25 22*   *  117* 125*   < > 152* 131* 156*   BUN 47*  47* 41*   < > 37* 34* 33*   CREATININE 1.7*  1.7* 1.5*   < > 1.4 1.3 1.3   CALCIUM 10.8*  10.8* 11.2*   < > 10.4 10.5 10.2   PROT 9.4* 7.3  --   --  7.6  --    ALBUMIN 3.9 3.1*  --   --  3.5  --    BILITOT 1.3* 1.8*  --   --  1.8*  --    ALKPHOS 154* 113  --   --  102  --    AST 25 72*  --   --  105*  --    ALT 26 22  --   --  22  --    ANIONGAP 15  15 15   < > 10 12 13   EGFRNONAA 44.4*  44.4* 51.7*   < > 56.2* >60.0 >60.0    < > = values in this interval not displayed.     Coagulation:   Recent Labs   Lab 02/07/20  0800   INR 1.3*   APTT 27.3     Lactic Acid: No results for input(s): LACTATE in the last 48 hours.  All pertinent labs within  the past 24 hours have been reviewed.    Significant Imaging:  I have reviewed all pertinent imaging results/findings within the past 24 hours.    Assessment/Plan:     Non-ischemic cardiomyopathy  Plan:    Neuro:   -Pain control: prn fentanyl 25 q1  -Sedation propofol  -Daily sedation vacations    Pulmonary:   -intubated  Vent Mode: A/C  Oxygen Concentration (%):  [40-50] 40  Resp Rate Total:  [14 br/min-30 br/min] 19 br/min  Vt Set:  [450 mL] 450 mL  PEEP/CPAP:  [5 cmH20-6 cmH20] 5 cmH20  Mean Airway Pressure:  [10 jnI17-21 cmH20] 10 cmH20  -Radha 20  -Daily SBT    Cardiac:  -MAP goal >65  -Epi 0.11,  5, cardene  -LVAD speed: 5100, LSL 4700 rpm  -LDH stable, continue to monitor     Renal:   -Mc in place  -UOP adequate  -Bun/Cr 34/1.3  -lasix gtt at 10    Fluids/Electrolytes/Nutrition/GI:   -Nutritional status: NPO  -replace lytes PRN  -NGT to LIWS  -bowel regimen: Miralax and docusate    Hematology/Oncology:  -H/H 10.4/33.1  -INR/Plts 1.3/236  -Trend CBC    Infectious Disease:   -Afebrile  -WBC 15.71  -Abx: vanc and ancef    Endocrine:  -Glucose goal of 120-180  -Insulin gtt at 0.5    Dispo:  -Continue care in the ICU setting. RTOR today for closure         Critical Care Daily Checklist:    A: Awake: RASS Goal/Actual Goal: RASS Goal: -3-->moderate sedation  Actual: Mcmillan Agitation Sedation Scale (RASS): Moderate sedation   B: Spontaneous Breathing Trial Performed? Spon. Breathing Trial Initiated?: Not initiated(open chest) (02/07/20 0715)   C: SAT & SBT Coordinated?                     D: Delirium: CAM-ICU Overall CAM-ICU: Negative   E: Early Mobility Performed? No   F: Feeding Goal: Goals: 1. Pt's intake meals >75% x 7 days.  Status: Nutrition Goal Status: goal met   Current Diet Order   No orders of the defined types were placed in this encounter.      AS: Analgesia/Sedation Fentanyl  Propofol   T: Thromboembolic Prophylaxis SCDs   H: HOB > 300 Yes   U: Stress Ulcer Prophylaxis (if needed) protonix   G:  Glucose Control Insulin gtt   B: Bowel Function Stool Occurrence: 0   I: Indwelling Catheter (Lines & Mc) Necessity Mc, chest tubes x2, CVC quad lumen, brenda bowers   D: De-escalation of Antimicrobials/Pharmacotherapies Continue on vanc and cefepime    Plan for the day/ETD RTOR for closure    Code Status:  Family/Goals of Care: Prior     Critical care was time spent personally by me on the following activities: development of treatment plan with patient or surrogate and bedside caregivers, discussions with consultants, evaluation of patient's response to treatment, examination of patient, ordering and performing treatments and interventions, ordering and review of laboratory studies, ordering and review of radiographic studies, pulse oximetry, re-evaluation of patient's condition.  This critical care time did not overlap with that of any other provider or involve time for any procedures.     Peggy Sanders MD  Critical Care - Surgery  [facility]
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
## 2020-02-07 NOTE — PROGRESS NOTES
Patient returned to room s/p chest closure. Per anesthesia report, good right ventricular function after chest closure noted. Secondary to surgery left pleural effusion no longer present. Blood loss minimal. Last known CVP 16-17, good UOP in case (about 600 cc). Was reversed. Came up on 0.09 epi, 10 cardene, 20 lasix, 15 nitric, 5 dobutamine (same as pre-operative gtts). Will continue gtts as is until further report received from CTS team; patient HDS.     Pat Venegas MD  Critical Care Surgery, PGYII [facility]

## 2020-02-07 NOTE — HPI
Saba Lott is a 54 y.o. w/ a significant PMHx of nonischemic CMP with EF 20%, s/p ICD implantation for primary prevention on 2/15/19 ([employer]), tobacco and alcohol abuse (quit 2018), hx of DVT right leg, HTN. Admitted to [facility] for cardiogenic shock, s/p IABP 2/3.      2/3 TTE  · Severely decreased left ventricular systolic function. The estimated ejection fraction is 15%.  · Severe left atrial enlargement.  · Left ventricular diastolic dysfunction.- increased LAP  · Moderate mitral regurgitation.  · Mild to moderate tricuspid regurgitation.  · Mild right ventricular enlargement. Mildly to moderately reduced right ventricular systolic function.  · Severe right atrial enlargement.  · Mild to moderate pulmonic regurgitation.  · Elevated central venous pressure (15 mmHg).  · The estimated PA systolic pressure is 38 mmHg.  · Trivial pericardial effusion.    RHC 1/27  · Estimated blood loss: none  · Filling pressures on the right and left are moderately elevated. Pulmonary HTN is moderate.  · Wedge confirmed by fluoroscopy as sat only 88%  · Pulmonary vascular resistance: 195. Systemic vascular resistance: 1422 TPG 10  · Mildly decreased CO /CI on epi 0.04 /  5 / lasix 40 / hr / nitric  · No prior RHC availible for comparison    On 2/6 patient underwent insertion of LVAD and mitral valve valuloplasty and IABP placement. He arrived to the SICU intubated and sedated with a propofol infusion. He arrived with an epi gtt at 0.12,  5, Radha at 25 ppm and insulin gtt at 0.5 (last glucose 142). He received 1 dose of vanc and cefepime intraop. He received 1L of crystalloid, no other products.  mL during the procedure. He has L CVC quad lumen, R mac w/ swan, 2 chest tubes. IABP removed at bedside.

## 2020-02-08 LAB
ALBUMIN SERPL BCP-MCNC: 3.1 G/DL (ref 3.5–5.2)
ALLENS TEST: ABNORMAL
ALLENS TEST: NORMAL
ALP SERPL-CCNC: 95 U/L (ref 55–135)
ALT SERPL W/O P-5'-P-CCNC: 22 U/L (ref 10–44)
ANION GAP SERPL CALC-SCNC: 11 MMOL/L (ref 8–16)
ANION GAP SERPL CALC-SCNC: 13 MMOL/L (ref 8–16)
ANION GAP SERPL CALC-SCNC: 14 MMOL/L (ref 8–16)
ANION GAP SERPL CALC-SCNC: 14 MMOL/L (ref 8–16)
APTT BLDCRRT: 28.6 SEC (ref 21–32)
APTT BLDCRRT: 29.2 SEC (ref 21–32)
APTT BLDCRRT: 29.9 SEC (ref 21–32)
APTT BLDCRRT: 31.8 SEC (ref 21–32)
APTT BLDCRRT: 32.7 SEC (ref 21–32)
AST SERPL-CCNC: 85 U/L (ref 10–40)
BACTERIA BLD CULT: NORMAL
BACTERIA BLD CULT: NORMAL
BASOPHILS # BLD AUTO: 0.03 K/UL (ref 0–0.2)
BASOPHILS # BLD AUTO: 0.03 K/UL (ref 0–0.2)
BASOPHILS # BLD AUTO: 0.04 K/UL (ref 0–0.2)
BASOPHILS # BLD AUTO: 0.05 K/UL (ref 0–0.2)
BASOPHILS # BLD AUTO: 0.05 K/UL (ref 0–0.2)
BASOPHILS # BLD AUTO: 0.06 K/UL (ref 0–0.2)
BASOPHILS NFR BLD: 0.2 % (ref 0–1.9)
BASOPHILS NFR BLD: 0.3 % (ref 0–1.9)
BASOPHILS NFR BLD: 0.3 % (ref 0–1.9)
BILIRUB DIRECT SERPL-MCNC: 1 MG/DL (ref 0.1–0.3)
BILIRUB SERPL-MCNC: 1.2 MG/DL (ref 0.1–1)
BLD PROD TYP BPU: NORMAL
BLOOD UNIT EXPIRATION DATE: NORMAL
BLOOD UNIT TYPE CODE: 5100
BLOOD UNIT TYPE: NORMAL
BUN SERPL-MCNC: 31 MG/DL (ref 6–20)
BUN SERPL-MCNC: 33 MG/DL (ref 6–20)
BUN SERPL-MCNC: 34 MG/DL (ref 6–20)
BUN SERPL-MCNC: 36 MG/DL (ref 6–20)
BUN SERPL-MCNC: 37 MG/DL (ref 6–20)
BUN SERPL-MCNC: 40 MG/DL (ref 6–20)
CALCIUM SERPL-MCNC: 9.4 MG/DL (ref 8.7–10.5)
CALCIUM SERPL-MCNC: 9.5 MG/DL (ref 8.7–10.5)
CALCIUM SERPL-MCNC: 9.8 MG/DL (ref 8.7–10.5)
CHLORIDE SERPL-SCNC: 101 MMOL/L (ref 95–110)
CHLORIDE SERPL-SCNC: 102 MMOL/L (ref 95–110)
CHLORIDE SERPL-SCNC: 103 MMOL/L (ref 95–110)
CHLORIDE SERPL-SCNC: 104 MMOL/L (ref 95–110)
CO2 SERPL-SCNC: 21 MMOL/L (ref 23–29)
CO2 SERPL-SCNC: 22 MMOL/L (ref 23–29)
CO2 SERPL-SCNC: 23 MMOL/L (ref 23–29)
CO2 SERPL-SCNC: 23 MMOL/L (ref 23–29)
CO2 SERPL-SCNC: 24 MMOL/L (ref 23–29)
CO2 SERPL-SCNC: 24 MMOL/L (ref 23–29)
CODING SYSTEM: NORMAL
CREAT SERPL-MCNC: 1.2 MG/DL (ref 0.5–1.4)
CREAT SERPL-MCNC: 1.2 MG/DL (ref 0.5–1.4)
CREAT SERPL-MCNC: 1.3 MG/DL (ref 0.5–1.4)
CREAT SERPL-MCNC: 1.4 MG/DL (ref 0.5–1.4)
DELSYS: ABNORMAL
DELSYS: NORMAL
DELSYS: NORMAL
DIFFERENTIAL METHOD: ABNORMAL
DISPENSE STATUS: NORMAL
EOSINOPHIL # BLD AUTO: 0.2 K/UL (ref 0–0.5)
EOSINOPHIL # BLD AUTO: 0.4 K/UL (ref 0–0.5)
EOSINOPHIL # BLD AUTO: 0.5 K/UL (ref 0–0.5)
EOSINOPHIL NFR BLD: 0.8 % (ref 0–8)
EOSINOPHIL NFR BLD: 0.9 % (ref 0–8)
EOSINOPHIL NFR BLD: 1.1 % (ref 0–8)
EOSINOPHIL NFR BLD: 1.1 % (ref 0–8)
EOSINOPHIL NFR BLD: 2 % (ref 0–8)
EOSINOPHIL NFR BLD: 2.4 % (ref 0–8)
ERYTHROCYTE [DISTWIDTH] IN BLOOD BY AUTOMATED COUNT: 18.5 % (ref 11.5–14.5)
ERYTHROCYTE [DISTWIDTH] IN BLOOD BY AUTOMATED COUNT: 18.5 % (ref 11.5–14.5)
ERYTHROCYTE [DISTWIDTH] IN BLOOD BY AUTOMATED COUNT: 18.6 % (ref 11.5–14.5)
ERYTHROCYTE [SEDIMENTATION RATE] IN BLOOD BY WESTERGREN METHOD: 14 MM/H
ERYTHROCYTE [SEDIMENTATION RATE] IN BLOOD BY WESTERGREN METHOD: 14 MM/H
ERYTHROCYTE [SEDIMENTATION RATE] IN BLOOD BY WESTERGREN METHOD: 16 MM/H
EST. GFR  (AFRICAN AMERICAN): >60 ML/MIN/1.73 M^2
EST. GFR  (NON AFRICAN AMERICAN): 56.2 ML/MIN/1.73 M^2
EST. GFR  (NON AFRICAN AMERICAN): >60 ML/MIN/1.73 M^2
FIO2: 40
FIO2: 40
GLUCOSE SERPL-MCNC: 123 MG/DL (ref 70–110)
GLUCOSE SERPL-MCNC: 128 MG/DL (ref 70–110)
GLUCOSE SERPL-MCNC: 138 MG/DL (ref 70–110)
GLUCOSE SERPL-MCNC: 143 MG/DL (ref 70–110)
GLUCOSE SERPL-MCNC: 146 MG/DL (ref 70–110)
GLUCOSE SERPL-MCNC: 146 MG/DL (ref 70–110)
GLUCOSE SERPL-MCNC: 162 MG/DL (ref 70–110)
GLUCOSE SERPL-MCNC: 179 MG/DL (ref 70–110)
HCO3 UR-SCNC: 21.2 MMOL/L (ref 24–28)
HCO3 UR-SCNC: 24.6 MMOL/L (ref 24–28)
HCO3 UR-SCNC: 25 MMOL/L (ref 24–28)
HCO3 UR-SCNC: 25.3 MMOL/L (ref 24–28)
HCO3 UR-SCNC: 26.5 MMOL/L (ref 24–28)
HCO3 UR-SCNC: 26.9 MMOL/L (ref 24–28)
HCO3 UR-SCNC: 29.3 MMOL/L (ref 24–28)
HCT VFR BLD AUTO: 30.6 % (ref 40–54)
HCT VFR BLD AUTO: 31.1 % (ref 40–54)
HCT VFR BLD AUTO: 31.3 % (ref 40–54)
HCT VFR BLD AUTO: 31.4 % (ref 40–54)
HCT VFR BLD AUTO: 31.4 % (ref 40–54)
HCT VFR BLD AUTO: 31.5 % (ref 40–54)
HCT VFR BLD CALC: 29 %PCV (ref 36–54)
HCT VFR BLD CALC: 30 %PCV (ref 36–54)
HCT VFR BLD CALC: 30 %PCV (ref 36–54)
HCT VFR BLD CALC: 31 %PCV (ref 36–54)
HCT VFR BLD CALC: 32 %PCV (ref 36–54)
HCT VFR BLD CALC: 33 %PCV (ref 36–54)
HCT VFR BLD CALC: 33 %PCV (ref 36–54)
HGB BLD-MCNC: 10 G/DL (ref 14–18)
HGB BLD-MCNC: 10 G/DL (ref 14–18)
HGB BLD-MCNC: 9.8 G/DL (ref 14–18)
HGB BLD-MCNC: 9.8 G/DL (ref 14–18)
HGB BLD-MCNC: 9.9 G/DL (ref 14–18)
HGB BLD-MCNC: 9.9 G/DL (ref 14–18)
IMM GRANULOCYTES # BLD AUTO: 0.16 K/UL (ref 0–0.04)
IMM GRANULOCYTES # BLD AUTO: 0.17 K/UL (ref 0–0.04)
IMM GRANULOCYTES # BLD AUTO: 0.19 K/UL (ref 0–0.04)
IMM GRANULOCYTES # BLD AUTO: 0.22 K/UL (ref 0–0.04)
IMM GRANULOCYTES # BLD AUTO: 0.25 K/UL (ref 0–0.04)
IMM GRANULOCYTES # BLD AUTO: 0.26 K/UL (ref 0–0.04)
IMM GRANULOCYTES NFR BLD AUTO: 0.8 % (ref 0–0.5)
IMM GRANULOCYTES NFR BLD AUTO: 0.9 % (ref 0–0.5)
IMM GRANULOCYTES NFR BLD AUTO: 1 % (ref 0–0.5)
IMM GRANULOCYTES NFR BLD AUTO: 1.1 % (ref 0–0.5)
IMM GRANULOCYTES NFR BLD AUTO: 1.2 % (ref 0–0.5)
IMM GRANULOCYTES NFR BLD AUTO: 1.3 % (ref 0–0.5)
INR PPP: 1.2 (ref 0.8–1.2)
INR PPP: 1.3 (ref 0.8–1.2)
LDH SERPL L TO P-CCNC: 0.72 MMOL/L (ref 0.36–1.25)
LDH SERPL L TO P-CCNC: 0.77 MMOL/L (ref 0.36–1.25)
LDH SERPL L TO P-CCNC: 0.79 MMOL/L (ref 0.36–1.25)
LDH SERPL L TO P-CCNC: 0.8 MMOL/L (ref 0.36–1.25)
LDH SERPL L TO P-CCNC: 0.92 MMOL/L (ref 0.36–1.25)
LDH SERPL L TO P-CCNC: 527 U/L (ref 110–260)
LYMPHOCYTES # BLD AUTO: 0.7 K/UL (ref 1–4.8)
LYMPHOCYTES # BLD AUTO: 0.9 K/UL (ref 1–4.8)
LYMPHOCYTES NFR BLD: 3.4 % (ref 18–48)
LYMPHOCYTES NFR BLD: 3.6 % (ref 18–48)
LYMPHOCYTES NFR BLD: 3.8 % (ref 18–48)
LYMPHOCYTES NFR BLD: 4.3 % (ref 18–48)
LYMPHOCYTES NFR BLD: 4.7 % (ref 18–48)
LYMPHOCYTES NFR BLD: 4.8 % (ref 18–48)
MAGNESIUM SERPL-MCNC: 2.2 MG/DL (ref 1.6–2.6)
MAGNESIUM SERPL-MCNC: 2.8 MG/DL (ref 1.6–2.6)
MAGNESIUM SERPL-MCNC: 3 MG/DL (ref 1.6–2.6)
MCH RBC QN AUTO: 25.9 PG (ref 27–31)
MCH RBC QN AUTO: 25.9 PG (ref 27–31)
MCH RBC QN AUTO: 26.1 PG (ref 27–31)
MCH RBC QN AUTO: 26.2 PG (ref 27–31)
MCH RBC QN AUTO: 26.2 PG (ref 27–31)
MCH RBC QN AUTO: 26.3 PG (ref 27–31)
MCHC RBC AUTO-ENTMCNC: 31.3 G/DL (ref 32–36)
MCHC RBC AUTO-ENTMCNC: 31.5 G/DL (ref 32–36)
MCHC RBC AUTO-ENTMCNC: 31.7 G/DL (ref 32–36)
MCHC RBC AUTO-ENTMCNC: 31.8 G/DL (ref 32–36)
MCHC RBC AUTO-ENTMCNC: 31.8 G/DL (ref 32–36)
MCHC RBC AUTO-ENTMCNC: 32 G/DL (ref 32–36)
MCV RBC AUTO: 81 FL (ref 82–98)
MCV RBC AUTO: 81 FL (ref 82–98)
MCV RBC AUTO: 83 FL (ref 82–98)
METHEMOGLOBIN: 0.7 % (ref 0–3)
MODE: ABNORMAL
MODE: NORMAL
MODE: NORMAL
MONOCYTES # BLD AUTO: 1.5 K/UL (ref 0.3–1)
MONOCYTES # BLD AUTO: 1.5 K/UL (ref 0.3–1)
MONOCYTES # BLD AUTO: 1.6 K/UL (ref 0.3–1)
MONOCYTES # BLD AUTO: 1.7 K/UL (ref 0.3–1)
MONOCYTES NFR BLD: 7.8 % (ref 4–15)
MONOCYTES NFR BLD: 7.8 % (ref 4–15)
MONOCYTES NFR BLD: 8 % (ref 4–15)
MONOCYTES NFR BLD: 8.3 % (ref 4–15)
MONOCYTES NFR BLD: 8.3 % (ref 4–15)
MONOCYTES NFR BLD: 8.6 % (ref 4–15)
NEUTROPHILS # BLD AUTO: 16.1 K/UL (ref 1.8–7.7)
NEUTROPHILS # BLD AUTO: 16.2 K/UL (ref 1.8–7.7)
NEUTROPHILS # BLD AUTO: 16.4 K/UL (ref 1.8–7.7)
NEUTROPHILS # BLD AUTO: 16.6 K/UL (ref 1.8–7.7)
NEUTROPHILS # BLD AUTO: 16.7 K/UL (ref 1.8–7.7)
NEUTROPHILS # BLD AUTO: 17.4 K/UL (ref 1.8–7.7)
NEUTROPHILS NFR BLD: 83.4 % (ref 38–73)
NEUTROPHILS NFR BLD: 84 % (ref 38–73)
NEUTROPHILS NFR BLD: 84.9 % (ref 38–73)
NEUTROPHILS NFR BLD: 85.4 % (ref 38–73)
NEUTROPHILS NFR BLD: 86 % (ref 38–73)
NEUTROPHILS NFR BLD: 86.9 % (ref 38–73)
NRBC BLD-RTO: 0 /100 WBC
NUM UNITS TRANS FFP: NORMAL
NUM UNITS TRANS PACKED RBC: NORMAL
PCO2 BLDA: 30.5 MMHG (ref 35–45)
PCO2 BLDA: 33.2 MMHG (ref 35–45)
PCO2 BLDA: 34.5 MMHG (ref 35–45)
PCO2 BLDA: 34.8 MMHG (ref 35–45)
PCO2 BLDA: 37.4 MMHG (ref 35–45)
PCO2 BLDA: 39 MMHG (ref 35–45)
PCO2 BLDA: 44.7 MMHG (ref 35–45)
PEEP: 5
PH SMN: 7.42 [PH] (ref 7.35–7.45)
PH SMN: 7.45 [PH] (ref 7.35–7.45)
PH SMN: 7.45 [PH] (ref 7.35–7.45)
PH SMN: 7.46 [PH] (ref 7.35–7.45)
PH SMN: 7.46 [PH] (ref 7.35–7.45)
PH SMN: 7.47 [PH] (ref 7.35–7.45)
PH SMN: 7.49 [PH] (ref 7.35–7.45)
PLATELET # BLD AUTO: 188 K/UL (ref 150–350)
PLATELET # BLD AUTO: 189 K/UL (ref 150–350)
PLATELET # BLD AUTO: 202 K/UL (ref 150–350)
PLATELET # BLD AUTO: 207 K/UL (ref 150–350)
PLATELET # BLD AUTO: 207 K/UL (ref 150–350)
PLATELET # BLD AUTO: 209 K/UL (ref 150–350)
PMV BLD AUTO: 10 FL (ref 9.2–12.9)
PMV BLD AUTO: 10.1 FL (ref 9.2–12.9)
PMV BLD AUTO: 9.5 FL (ref 9.2–12.9)
PMV BLD AUTO: 9.5 FL (ref 9.2–12.9)
PMV BLD AUTO: 9.8 FL (ref 9.2–12.9)
PMV BLD AUTO: 9.9 FL (ref 9.2–12.9)
PO2 BLDA: 175 MMHG (ref 80–100)
PO2 BLDA: 190 MMHG (ref 80–100)
PO2 BLDA: 191 MMHG (ref 80–100)
PO2 BLDA: 202 MMHG (ref 80–100)
PO2 BLDA: 210 MMHG (ref 80–100)
PO2 BLDA: 308 MMHG (ref 80–100)
PO2 BLDA: 570 MMHG (ref 80–100)
POC BE: -3 MMOL/L
POC BE: 1 MMOL/L
POC BE: 2 MMOL/L
POC BE: 2 MMOL/L
POC BE: 3 MMOL/L
POC BE: 3 MMOL/L
POC BE: 5 MMOL/L
POC IONIZED CALCIUM: 1.06 MMOL/L (ref 1.06–1.42)
POC IONIZED CALCIUM: 1.07 MMOL/L (ref 1.06–1.42)
POC IONIZED CALCIUM: 1.09 MMOL/L (ref 1.06–1.42)
POC IONIZED CALCIUM: 1.1 MMOL/L (ref 1.06–1.42)
POC IONIZED CALCIUM: 1.13 MMOL/L (ref 1.06–1.42)
POC IONIZED CALCIUM: 1.16 MMOL/L (ref 1.06–1.42)
POC IONIZED CALCIUM: 1.18 MMOL/L (ref 1.06–1.42)
POC SATURATED O2: 100 % (ref 95–100)
POC TCO2: 22 MMOL/L (ref 23–27)
POC TCO2: 26 MMOL/L (ref 23–27)
POC TCO2: 28 MMOL/L (ref 23–27)
POC TCO2: 28 MMOL/L (ref 23–27)
POC TCO2: 31 MMOL/L (ref 23–27)
POCT GLUCOSE: 117 MG/DL (ref 70–110)
POCT GLUCOSE: 120 MG/DL (ref 70–110)
POCT GLUCOSE: 121 MG/DL (ref 70–110)
POCT GLUCOSE: 123 MG/DL (ref 70–110)
POCT GLUCOSE: 124 MG/DL (ref 70–110)
POCT GLUCOSE: 126 MG/DL (ref 70–110)
POCT GLUCOSE: 128 MG/DL (ref 70–110)
POCT GLUCOSE: 128 MG/DL (ref 70–110)
POCT GLUCOSE: 131 MG/DL (ref 70–110)
POCT GLUCOSE: 131 MG/DL (ref 70–110)
POCT GLUCOSE: 132 MG/DL (ref 70–110)
POCT GLUCOSE: 133 MG/DL (ref 70–110)
POCT GLUCOSE: 136 MG/DL (ref 70–110)
POCT GLUCOSE: 141 MG/DL (ref 70–110)
POCT GLUCOSE: 156 MG/DL (ref 70–110)
POCT GLUCOSE: 185 MG/DL (ref 70–110)
POCT GLUCOSE: 92 MG/DL (ref 70–110)
POTASSIUM BLD-SCNC: 3.4 MMOL/L (ref 3.5–5.1)
POTASSIUM BLD-SCNC: 3.9 MMOL/L (ref 3.5–5.1)
POTASSIUM BLD-SCNC: 4 MMOL/L (ref 3.5–5.1)
POTASSIUM BLD-SCNC: 4.2 MMOL/L (ref 3.5–5.1)
POTASSIUM BLD-SCNC: 4.2 MMOL/L (ref 3.5–5.1)
POTASSIUM BLD-SCNC: 4.3 MMOL/L (ref 3.5–5.1)
POTASSIUM BLD-SCNC: 4.3 MMOL/L (ref 3.5–5.1)
POTASSIUM SERPL-SCNC: 3.9 MMOL/L (ref 3.5–5.1)
POTASSIUM SERPL-SCNC: 3.9 MMOL/L (ref 3.5–5.1)
POTASSIUM SERPL-SCNC: 4.2 MMOL/L (ref 3.5–5.1)
POTASSIUM SERPL-SCNC: 4.3 MMOL/L (ref 3.5–5.1)
POTASSIUM SERPL-SCNC: 4.5 MMOL/L (ref 3.5–5.1)
POTASSIUM SERPL-SCNC: 4.6 MMOL/L (ref 3.5–5.1)
PREALB SERPL-MCNC: 14 MG/DL (ref 20–43)
PROT SERPL-MCNC: 7.6 G/DL (ref 6–8.4)
PROTHROMBIN TIME: 11.9 SEC (ref 9–12.5)
PROTHROMBIN TIME: 12.1 SEC (ref 9–12.5)
PROTHROMBIN TIME: 12.1 SEC (ref 9–12.5)
PROTHROMBIN TIME: 12.3 SEC (ref 9–12.5)
PROTHROMBIN TIME: 12.3 SEC (ref 9–12.5)
PROTHROMBIN TIME: 12.7 SEC (ref 9–12.5)
RBC # BLD AUTO: 3.76 M/UL (ref 4.6–6.2)
RBC # BLD AUTO: 3.77 M/UL (ref 4.6–6.2)
RBC # BLD AUTO: 3.78 M/UL (ref 4.6–6.2)
RBC # BLD AUTO: 3.79 M/UL (ref 4.6–6.2)
RBC # BLD AUTO: 3.82 M/UL (ref 4.6–6.2)
RBC # BLD AUTO: 3.86 M/UL (ref 4.6–6.2)
SAMPLE: ABNORMAL
SAMPLE: NORMAL
SITE: ABNORMAL
SITE: NORMAL
SODIUM BLD-SCNC: 135 MMOL/L (ref 136–145)
SODIUM BLD-SCNC: 136 MMOL/L (ref 136–145)
SODIUM BLD-SCNC: 137 MMOL/L (ref 136–145)
SODIUM BLD-SCNC: 137 MMOL/L (ref 136–145)
SODIUM BLD-SCNC: 138 MMOL/L (ref 136–145)
SODIUM BLD-SCNC: 138 MMOL/L (ref 136–145)
SODIUM BLD-SCNC: 139 MMOL/L (ref 136–145)
SODIUM SERPL-SCNC: 136 MMOL/L (ref 136–145)
SODIUM SERPL-SCNC: 138 MMOL/L (ref 136–145)
SODIUM SERPL-SCNC: 139 MMOL/L (ref 136–145)
VT: 450
WBC # BLD AUTO: 18.86 K/UL (ref 3.9–12.7)
WBC # BLD AUTO: 19.09 K/UL (ref 3.9–12.7)
WBC # BLD AUTO: 19.17 K/UL (ref 3.9–12.7)
WBC # BLD AUTO: 19.45 K/UL (ref 3.9–12.7)
WBC # BLD AUTO: 19.87 K/UL (ref 3.9–12.7)
WBC # BLD AUTO: 20.48 K/UL (ref 3.9–12.7)

## 2020-02-08 PROCEDURE — 27000221 HC OXYGEN, UP TO 24 HOURS

## 2020-02-08 PROCEDURE — 83050 HGB METHEMOGLOBIN QUAN: CPT

## 2020-02-08 PROCEDURE — 76937 US GUIDE VASCULAR ACCESS: CPT

## 2020-02-08 PROCEDURE — 99233 SBSQ HOSP IP/OBS HIGH 50: CPT | Mod: ,,, | Performed by: INTERNAL MEDICINE

## 2020-02-08 PROCEDURE — 99233 PR SUBSEQUENT HOSPITAL CARE,LEVL III: ICD-10-PCS | Mod: ,,, | Performed by: INTERNAL MEDICINE

## 2020-02-08 PROCEDURE — 94761 N-INVAS EAR/PLS OXIMETRY MLT: CPT

## 2020-02-08 PROCEDURE — 93750 PR INTERROGATE VENT ASSIST DEV, IN PERSON, W PHYSICIAN ANALYSIS: ICD-10-PCS | Mod: ,,, | Performed by: INTERNAL MEDICINE

## 2020-02-08 PROCEDURE — 99900035 HC TECH TIME PER 15 MIN (STAT)

## 2020-02-08 PROCEDURE — 82330 ASSAY OF CALCIUM: CPT

## 2020-02-08 PROCEDURE — 84295 ASSAY OF SERUM SODIUM: CPT

## 2020-02-08 PROCEDURE — 63600367 HC NITRIC OXIDE PER HOUR

## 2020-02-08 PROCEDURE — 25000003 PHARM REV CODE 250: Performed by: SURGERY

## 2020-02-08 PROCEDURE — 83615 LACTATE (LD) (LDH) ENZYME: CPT

## 2020-02-08 PROCEDURE — 84132 ASSAY OF SERUM POTASSIUM: CPT

## 2020-02-08 PROCEDURE — 85730 THROMBOPLASTIN TIME PARTIAL: CPT

## 2020-02-08 PROCEDURE — 80076 HEPATIC FUNCTION PANEL: CPT

## 2020-02-08 PROCEDURE — 99233 PR SUBSEQUENT HOSPITAL CARE,LEVL III: ICD-10-PCS | Mod: ,,, | Performed by: NURSE PRACTITIONER

## 2020-02-08 PROCEDURE — 80048 BASIC METABOLIC PNL TOTAL CA: CPT | Mod: 91

## 2020-02-08 PROCEDURE — 85025 COMPLETE CBC W/AUTO DIFF WBC: CPT | Mod: 91

## 2020-02-08 PROCEDURE — 37799 UNLISTED PX VASCULAR SURGERY: CPT

## 2020-02-08 PROCEDURE — C1751 CATH, INF, PER/CENT/MIDLINE: HCPCS

## 2020-02-08 PROCEDURE — 94003 VENT MGMT INPAT SUBQ DAY: CPT

## 2020-02-08 PROCEDURE — C9113 INJ PANTOPRAZOLE SODIUM, VIA: HCPCS | Performed by: STUDENT IN AN ORGANIZED HEALTH CARE EDUCATION/TRAINING PROGRAM

## 2020-02-08 PROCEDURE — 63600175 PHARM REV CODE 636 W HCPCS: Performed by: STUDENT IN AN ORGANIZED HEALTH CARE EDUCATION/TRAINING PROGRAM

## 2020-02-08 PROCEDURE — 36415 COLL VENOUS BLD VENIPUNCTURE: CPT

## 2020-02-08 PROCEDURE — 63600175 PHARM REV CODE 636 W HCPCS: Performed by: THORACIC SURGERY (CARDIOTHORACIC VASCULAR SURGERY)

## 2020-02-08 PROCEDURE — 99291 PR CRITICAL CARE, E/M 30-74 MINUTES: ICD-10-PCS | Mod: GC,,, | Performed by: SURGERY

## 2020-02-08 PROCEDURE — 20000000 HC ICU ROOM

## 2020-02-08 PROCEDURE — 82803 BLOOD GASES ANY COMBINATION: CPT

## 2020-02-08 PROCEDURE — 99291 CRITICAL CARE FIRST HOUR: CPT | Mod: GC,,, | Performed by: SURGERY

## 2020-02-08 PROCEDURE — 93750 INTERROGATION VAD IN PERSON: CPT | Mod: ,,, | Performed by: INTERNAL MEDICINE

## 2020-02-08 PROCEDURE — 85014 HEMATOCRIT: CPT

## 2020-02-08 PROCEDURE — 36573 INSJ PICC RS&I 5 YR+: CPT

## 2020-02-08 PROCEDURE — 83605 ASSAY OF LACTIC ACID: CPT

## 2020-02-08 PROCEDURE — 85610 PROTHROMBIN TIME: CPT | Mod: 91

## 2020-02-08 PROCEDURE — 25000003 PHARM REV CODE 250: Performed by: STUDENT IN AN ORGANIZED HEALTH CARE EDUCATION/TRAINING PROGRAM

## 2020-02-08 PROCEDURE — 27000248 HC VAD-ADDITIONAL DAY

## 2020-02-08 PROCEDURE — 99233 SBSQ HOSP IP/OBS HIGH 50: CPT | Mod: ,,, | Performed by: NURSE PRACTITIONER

## 2020-02-08 PROCEDURE — 83735 ASSAY OF MAGNESIUM: CPT | Mod: 91

## 2020-02-08 RX ORDER — GLUCAGON 1 MG
1 KIT INJECTION
Status: DISCONTINUED | OUTPATIENT
Start: 2020-02-08 | End: 2020-02-10

## 2020-02-08 RX ORDER — HEPARIN SODIUM 10000 [USP'U]/100ML
400 INJECTION, SOLUTION INTRAVENOUS CONTINUOUS
Status: DISCONTINUED | OUTPATIENT
Start: 2020-02-08 | End: 2020-02-09

## 2020-02-08 RX ORDER — INSULIN ASPART 100 [IU]/ML
1-10 INJECTION, SOLUTION INTRAVENOUS; SUBCUTANEOUS
Status: DISCONTINUED | OUTPATIENT
Start: 2020-02-08 | End: 2020-02-10

## 2020-02-08 RX ORDER — SODIUM CHLORIDE 0.9 % (FLUSH) 0.9 %
10 SYRINGE (ML) INJECTION EVERY 6 HOURS
Status: DISCONTINUED | OUTPATIENT
Start: 2020-02-08 | End: 2020-02-27 | Stop reason: HOSPADM

## 2020-02-08 RX ORDER — SODIUM CHLORIDE 0.9 % (FLUSH) 0.9 %
10 SYRINGE (ML) INJECTION
Status: DISCONTINUED | OUTPATIENT
Start: 2020-02-08 | End: 2020-02-27 | Stop reason: HOSPADM

## 2020-02-08 RX ADMIN — CHLOROTHIAZIDE SODIUM 500 MG: 500 INJECTION, POWDER, LYOPHILIZED, FOR SOLUTION INTRAVENOUS at 08:02

## 2020-02-08 RX ADMIN — VANCOMYCIN HYDROCHLORIDE 1250 MG: 1.25 INJECTION, POWDER, LYOPHILIZED, FOR SOLUTION INTRAVENOUS at 08:02

## 2020-02-08 RX ADMIN — PANTOPRAZOLE SODIUM 40 MG: 40 INJECTION, POWDER, FOR SOLUTION INTRAVENOUS at 09:02

## 2020-02-08 RX ADMIN — ASPIRIN 325 MG ORAL TABLET 325 MG: 325 PILL ORAL at 09:02

## 2020-02-08 RX ADMIN — MUPIROCIN: 20 OINTMENT TOPICAL at 09:02

## 2020-02-08 RX ADMIN — HYDRALAZINE HYDROCHLORIDE 10 MG: 20 INJECTION INTRAMUSCULAR; INTRAVENOUS at 05:02

## 2020-02-08 RX ADMIN — CEFEPIME 2 G: 2 INJECTION, POWDER, FOR SOLUTION INTRAVENOUS at 04:02

## 2020-02-08 RX ADMIN — PROPOFOL 40 MCG/KG/MIN: 10 INJECTION, EMULSION INTRAVENOUS at 12:02

## 2020-02-08 RX ADMIN — POLYETHYLENE GLYCOL 3350 17 G: 17 POWDER, FOR SOLUTION ORAL at 09:02

## 2020-02-08 RX ADMIN — POTASSIUM CHLORIDE 20 MEQ: 14.9 INJECTION, SOLUTION INTRAVENOUS at 09:02

## 2020-02-08 RX ADMIN — PROPOFOL 40 MCG/KG/MIN: 10 INJECTION, EMULSION INTRAVENOUS at 06:02

## 2020-02-08 RX ADMIN — MUPIROCIN: 20 OINTMENT TOPICAL at 08:02

## 2020-02-08 RX ADMIN — CEFEPIME 2 G: 2 INJECTION, POWDER, FOR SOLUTION INTRAVENOUS at 12:02

## 2020-02-08 RX ADMIN — NICARDIPINE HYDROCHLORIDE 2.5 MG/HR: 0.2 INJECTION, SOLUTION INTRAVENOUS at 09:02

## 2020-02-08 RX ADMIN — FUROSEMIDE 20 MG/HR: 10 INJECTION, SOLUTION INTRAMUSCULAR; INTRAVENOUS at 05:02

## 2020-02-08 RX ADMIN — PROPOFOL 40 MCG/KG/MIN: 10 INJECTION, EMULSION INTRAVENOUS at 09:02

## 2020-02-08 RX ADMIN — WARFARIN SODIUM 2 MG: 2 TABLET ORAL at 12:02

## 2020-02-08 RX ADMIN — MAGNESIUM SULFATE HEPTAHYDRATE 2 G: 40 INJECTION, SOLUTION INTRAVENOUS at 06:02

## 2020-02-08 RX ADMIN — EPINEPHRINE 0.08 MCG/KG/MIN: 1 INJECTION INTRAMUSCULAR; INTRAVENOUS; SUBCUTANEOUS at 02:02

## 2020-02-08 NOTE — ASSESSMENT & PLAN NOTE
Plan:    Neuro:   -Pain control: prn fentanyl 25 q1  -Sedation propofol  -Daily sedation vacations    Pulmonary:   -intubated  Vent Mode: A/C  Oxygen Concentration (%):  [40] 40  Resp Rate Total:  [16 br/min-24 br/min] 20 br/min  Vt Set:  [450 mL] 450 mL  PEEP/CPAP:  [5 cmH20] 5 cmH20  Mean Airway Pressure:  [9.4 aiM97-06 cmH20] 11 cmH20  -Radha to 7.5, take to 5 late tonight for CPAP trial early AM and possible extubation on CPAP 10/5   -Daily SBT    Cardiac:  -MAP goal >65  -Epi 0.07,  5  -LVAD speed 5200, flows 4.2 - 4.6  -LDH stable, continue to monitor  -CT output monitor every hour  -d/c Tom   -PICC consult, if PICC placed pull cordis    Renal:   -Mc in place  -UOP adequate  -Bun/Cr 33/1.3  -lasix gtt at 20  -diuril 500 daily x 2 days (ends 2/9)    Fluids/Electrolytes/Nutrition/GI:   -Nutritional status: NPO  -replace lytes PRN  -place NGT, set to LIWS  -bowel regimen: Miralax and docusate  -holding TF until tomorrow after extubation  -SLP c/s    Hematology/Oncology:  -H/H stable  -INR/Plts 1.2/209  -Trend CBC  -   Infectious Disease:   -Afebrile  -WBC 19  -Abx: vanc and ancef    Endocrine:  -Glucose goal of 120-180  -Insulin gtt at 0.5  -endo on board    Dispo:  -Continue care in the ICU setting. Wean Radha for potential extubation tomorrow morning. PICC consult, d/c Tom and if PICC placed d/c cordis, continue diuresis now on lasix gtt and diuril, weaning epi slowly, NGT to be placed today.

## 2020-02-08 NOTE — PLAN OF CARE
Pt remains intubated, wakes up, follows commands.  Ventilator AC/40%/5, Radha 15ppm.  Remains on lasix, epinephrine, dobutamine and insulin gtt.  Nicardipine titrated to maintain MAP<85.  PRN hydralazine as able.  Adequate UOP and chest tube output minimal.  LVAD speed 5200, flows 4.2-4.6, no alarms appreciated.  Heparin gtt initiated at 200 units/hr, to be titrated per Dr. Joshi.  No family at bedside to discuss plan of care.

## 2020-02-08 NOTE — CONSULTS
Consult received re: nutritional assessment. RD is following pt, assessed pt post-VAD placement yesterday. Will post recs below. Noted pt with moderate malnutrition per previous RD assessment. Will continue to follow-up as previously scheduled.    Recommendations  1. When able to extubate, ADAT to Cardiac with texture per SLP.   2. If unable to extubate, recommend initiating trickle TF of Peptamen Intense VHP.   RD to monitor.     Goals: Patient to receive nutrition by RD follow-up

## 2020-02-08 NOTE — PROGRESS NOTES
Dr. Joshi informed CVP 18, UOP 65-75mL/hr overnight on lasix gtt 20mg/hr. Order received for diuril 500mg IVPB x1.

## 2020-02-08 NOTE — CONSULTS
Trip[le Lumen PICC placed in R basilic vein, 39cm in length with 0cm exposed. Arm circumference 29cm. Lot#[id number]

## 2020-02-08 NOTE — PROGRESS NOTES
[facility]  Heart Transplant  Progress Note    Patient Name: Saba Lott  MRN: [id number]  Admission Date: 1/15/2020  Hospital Length of Stay: 24 days  Attending Physician: David Joshi MD  Primary Care Provider: Primary Doctor No  Principal Problem:Presence of left ventricular assist device (LVAD)    Subjective:     Interval History: Intubated/sedated. Follows simple commands.     Continuous Infusions:   sodium chloride 0.9% 10 mL/hr at 02/07/20 1632    dextrose 5 % and 0.45 % NaCl with KCl 40 mEq 10 mL/hr at 02/07/20 1632    DOBUTamine 5 mcg/kg/min (02/08/20 0600)    epinephrine 0.08 mcg/kg/min (02/08/20 0600)    furosemide (LASIX) 2 mg/mL infusion (non-titrating) 20 mg/hr (02/08/20 0600)    heparin (porcine) in 5 % dex 200 Units/hr (02/08/20 0600)    insulin (HUMAN R) infusion (adults) 2.4 Units/hr (02/07/20 2200)    nicardipine Stopped (02/07/20 2300)    nitric oxide gas      propofol 40 mcg/kg/min (02/08/20 0628)     Scheduled Meds:   albumin human 5%  25 g Intravenous Once    aspirin  325 mg Oral Daily    aspirin  325 mg Per NG tube Daily    ceFEPime (MAXIPIME) IVPB  2 g Intravenous Q8H    chlorothiazide (DIURIL) IVPB  500 mg Intravenous Once    docusate sodium  100 mg Oral BID    ferrous gluconate  324 mg Oral Daily with breakfast    mupirocin   Nasal BID    pantoprazole  40 mg Intravenous Daily    polyethylene glycol  17 g Oral Daily    vancomycin (VANCOCIN) IVPB  1,250 mg Intravenous Q24H     PRN Meds:albumin human 5%, albuterol sulfate, bisacodyL, Dextrose 10% Bolus, Dextrose 10% Bolus, Dextrose 10% Bolus, Dextrose 10% Bolus, fentaNYL, hydrALAZINE, magnesium hydroxide 400 mg/5 ml, magnesium sulfate IVPB, oxyCODONE, oxyCODONE, potassium chloride, potassium chloride, sodium chloride 0.9%    Review of patient's allergies indicates:   Allergen Reactions    Iodine and iodide containing products      Objective:     Vital Signs (Most Recent):  Temp: 98.4 °F (36.9 °C)  (02/08/20 0700)  Pulse: 100 (02/08/20 0903)  Resp: 20 (02/08/20 0903)  BP: (!) 82/0 (02/08/20 0800)  SpO2: 100 % (02/07/20 2347) Vital Signs (24h Range):  Temp:  [97.6 °F (36.4 °C)-98.4 °F (36.9 °C)] 98.4 °F (36.9 °C)  Pulse:  [] 100  Resp:  [16-24] 20  SpO2:  [99 %-100 %] 100 %  BP: (80-84)/(0) 82/0  Arterial Line BP: ()/() 90/75     Patient Vitals for the past 72 hrs (Last 3 readings):   Weight   02/07/20 0429 86.4 kg (190 lb 7.6 oz)   02/06/20 0300 84.6 kg (186 lb 8.2 oz)   02/05/20 1100 87.9 kg (193 lb 12.6 oz)     Body mass index is 29.83 kg/m².      Intake/Output Summary (Last 24 hours) at 2/8/2020 0927  Last data filed at 2/8/2020 0900  Gross per 24 hour   Intake 2839.6 ml   Output 2860 ml   Net -20.4 ml     PAP: (23-29)/(12-20) 28/19  PAP (Mean):  [17 mmHg-24 mmHg] 23 mmHg  Physical Exam   Constitutional: He appears well-developed and well-nourished. He is sedated and intubated.   HENT:   Head: Normocephalic and atraumatic.   Eyes: Pupils are equal, round, and reactive to light. EOM are normal.   Neck:   RIJ cordis/PA   LIJ Quad lumen   Cardiovascular: Normal rate and regular rhythm.   VAD hum.   Pulmonary/Chest: Effort normal and breath sounds normal. He is intubated. No respiratory distress. He has no wheezes. He has no rales.   Abdominal: Soft. Bowel sounds are normal. He exhibits distension. There is no tenderness.   Musculoskeletal: Normal range of motion.   Neurological: No cranial nerve deficit.   Skin: Skin is warm and dry. Capillary refill takes less than 2 seconds.     Significant Labs:  CBC:  Recent Labs   Lab 02/07/20  2357  02/08/20  0407 02/08/20  0718 02/08/20  0820   WBC 18.86*  --  19.17*  --  19.09*   RBC 3.82*  --  3.86*  --  3.78*   HGB 10.0*  --  10.0*  --  9.9*   HCT 31.5*   < > 31.4* 33* 31.4*     --  209  --  189   MCV 83  --  81*  --  83   MCH 26.2*  --  25.9*  --  26.2*   MCHC 31.7*  --  31.8*  --  31.5*    < > = values in this interval not displayed.      BNP:  Recent Labs   Lab 02/02/20  1511 02/05/20  0300 02/06/20  0334   BNP 3,069* 1,865* 1,420*     CMP:  Recent Labs   Lab 02/06/20  1248  02/07/20  0405  02/07/20  2357 02/08/20  0407 02/08/20  0820   *   < > 131*   < > 123* 146* 146*   CALCIUM 11.2*   < > 10.5   < > 9.5 9.4 9.4   ALBUMIN 3.1*  --  3.5  --   --  3.1*  --    PROT 7.3  --  7.6  --   --  7.6  --    *   < > 136   < > 138 136 139   K 3.4*   < > 4.6   < > 4.6 4.5 4.3   CO2 26   < > 25   < > 24 21* 22*   CL 93*   < > 99   < > 103 101 104   BUN 41*   < > 34*   < > 31* 33* 34*   CREATININE 1.5*   < > 1.3   < > 1.2 1.3 1.2   ALKPHOS 113  --  102  --   --  95  --    ALT 22  --  22  --   --  22  --    AST 72*  --  105*  --   --  85*  --    BILITOT 1.8*  --  1.8*  --   --  1.2*  --     < > = values in this interval not displayed.      Coagulation:   Recent Labs   Lab 02/07/20 2357 02/08/20  0407 02/08/20  0820   INR 1.3* 1.2 1.2   APTT 32.7* 31.8  31.8 29.2     LDH:  Recent Labs   Lab 02/06/20  1250 02/07/20  0800 02/08/20  0407   * 562* 527*     Microbiology:  Microbiology Results (last 7 days)     Procedure Component Value Units Date/Time    Blood culture [[id number]] Collected:  02/03/20 0946    Order Status:  Completed Specimen:  Blood from Peripheral, Hand, Right Updated:  02/07/20 1022     Blood Culture, Routine No Growth to date      No Growth to date      No Growth to date      No Growth to date      No Growth to date    Blood culture [[id number]] Collected:  02/03/20 0946    Order Status:  Completed Specimen:  Blood from Peripheral, Hand, Right Updated:  02/07/20 1022     Blood Culture, Routine No Growth to date      No Growth to date      No Growth to date      No Growth to date      No Growth to date        I have reviewed all pertinent labs within the past 24 hours.    Estimated Creatinine Clearance: 73 mL/min (based on SCr of 1.2 mg/dL).    Diagnostic Results:  I have reviewed and interpreted all pertinent imaging  results/findings within the past 24 hours.    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 Chronic MARC - Class II-III and mild LE edema.      Hospital Course (synopsis of major diagnoses, care, treatment, and services provided during the course of the hospital stay):  -2/12 admit to CDU for diuresis with IV lasix  -IV abx   -CXR with suspected small left pleural effusion with associated left basilar atelectasis versus evolving airspace disease  -2/13 single chamber ICD implant; IV lasix decreased to BID  -2/16 add dobutamine, hold BB due to hypotension, no ACE-I or ARB due to recent HPI hypotension  -2/17 Lasix changed to PO  -2/18 dobutamine discontinued    Ad[place] on Thursday due to severe SOB for a week with associated orthopnea, MARC, and PND unable to lie flat and found to be in acute diastolic heart failure. States he normally weighs around 219 but up to 254lb on admission tonight. Says he hasn't been the most compliant in terms of fluid restriction and daily weights but has avoided salt. BNP on admission was 2375. BUN/CRT 32/1.4 He was started on IV diuretics but continued to deteriorate. Creatinine continued to increase and reached 4.3 with oliguria. He was started on CRRT for a few days and tolerated well. Renal u/s was negative for obstruction and liver u/s without findings of cirrhosis. All of this was progression of cardiorenal syndrome with liver congestion from severe volume overload. On arrival vitals stable and in no acute distress. He has obvious anasarca up to the chest. He did have uop of 500 at time of arrival also.    TTE 5/28/19  · Moderate left ventricular enlargement.  · Severely decreased left ventricular systolic function. The estimated ejection fraction is 20%  · Global  hypokinetic wall motion.  · Grade III (severe) left ventricular diastolic dysfunction consistent with restrictive physiology.  · Severe left atrial enlargement.  · Moderate right ventricular enlargement.  · Low normal right ventricular systolic function.  · Mild right atrial enlargement.  · Moderate mitral regurgitation.  Mild to moderate tricuspid regurgitation    * Presence of left ventricular assist device (LVAD)  -HeartMate 3 Implanted 02/06/2020 as DT. Chest closed 2/7.   -CTS Primary.  -Implanted by Dr. Joshi.  -INR sub therapeutic Coumadin, Goal INR 2.0-3.0. Anticoag per CTS.   -Antiplatelets  mg.  -LDH is stable overall today. Will continue to monitor daily.  -Continues on Epi, , Cardene, Radha.  -CVP elevated. Lasix 20mg/hr. Would rec rebolusing and increasing to 40mg/hr. Can also consider adding Diuril.  -Speed set at 5100, LSL 4700 rpm.  -Not listed for OHTx.  Procedure: Device Interrogation Including analysis of device parameters.  Current Settings: Ventricular Assist Device.  Review of device function is stable/unstable stable.    TXP LVAD INTERROGATIONS 2/8/2020 2/8/2020 2/8/2020 2/8/2020 2/8/2020 2/8/2020 2/8/2020   Type HeartMate3 - - HeartMate3 - - -   Flow 4.4 4.5 4.6 4.4 4.3 4.3 4.3   Speed 5200 5200 5200 5200 5200 5200 5200   PI 3.5 3.8 3.0 3.5 3.5 3.5 3.5   Power (Centeno) 3.7 3.7 3.8 3.8 3.8 3.7 3.8   LSL 4800 - - 4800 - - -   Pulsatility No Pulse - - No Pulse - - -       ANJELICA (acute kidney injury)  -Creatinine was 3.0 on admit and got as high as 4.3 at OSH prior to transfer. Renal function was normal 8 months ago.  -Trending down today.     Acute hyperglycemia  -HgA1C 6.6  -Endocrine following    Moderate malnutrition  - Boost glucose control added 1/27   - Prealbumin is 23    Morbid obesity  -Weight down considerably since admit with diuresis.    Cardiogenic shock  -NICM; Likely Etoh induced  -Transferred from [facility] with IABP at 1:1 for further level of care. IABP removed  1/25 and replaced 2/3.   -S/P HMIII on 2/3.     Deep vein thrombosis (DVT) of right lower extremity  -Unsure of timing but was on eliquis PTA.   -Will anticoag with heparin/warfarin post VAD.     AICD (automatic cardioverter/defibrillator) present  -Medtronic single chamber ICD placed 2019 for primary prevention      Mickey Rider NP  Heart Transplant  [facility]

## 2020-02-08 NOTE — PT/OT/SLP PROGRESS
Occupational Therapy      Patient Name:  Saba Lott   MRN:  [id number]    OT re-evaluation orders received and acknowledged. Pt remains intubated at this time. RN reported possible extubation 2/9/2020. Will follow-up 2/9/2020 if extubated.    Brooke Fraire, OTR/L  Pager: [phone]  2/8/2020

## 2020-02-08 NOTE — SUBJECTIVE & OBJECTIVE
Interval History/Significant Events:     POD1 chest closure.  No issues overnight.  MAPs 78-82  CVP 12 - 19 now 18  CT with R med 75, L med 120 output  On epi 0.08 and  5  Radha 15  Lasix gtt 20 with 2.4 L out in 24 hours    Follow-up For: Procedure(s) (LRB):  CLOSURE, WOUND, STERNUM (N/A)  INSERTION-RIGHT VENTRICULAR ASSIST DEVICE (Right)    Post-Operative Day: Day of Surgery    Objective:     Vital Signs (Most Recent):  Temp: 98.3 °F (36.8 °C) (02/08/20 1100)  Pulse: 97 (02/08/20 1308)  Resp: 19 (02/08/20 1308)  BP: (!) 88/0 (02/08/20 1146)  SpO2: 100 % (02/07/20 2347) Vital Signs (24h Range):  Temp:  [97.6 °F (36.4 °C)-98.4 °F (36.9 °C)] 98.3 °F (36.8 °C)  Pulse:  [] 97  Resp:  [16-24] 19  SpO2:  [99 %-100 %] 100 %  BP: (80-88)/(0) 88/0  Arterial Line BP: ()/() 86/70     Weight: 86.4 kg (190 lb 7.6 oz)  Body mass index is 29.83 kg/m².      Intake/Output Summary (Last 24 hours) at 2/8/2020 1335  Last data filed at 2/8/2020 1300  Gross per 24 hour   Intake 2389.6 ml   Output 2795 ml   Net -405.4 ml       Physical Exam   Constitutional: He appears well-developed and well-nourished.   HENT:   Head: Normocephalic and atraumatic.   Intubated   Cardiovascular: Normal rate.   LVAD in place  Right and left mediastinal chest tubes ss output  Tom and cordis in place   Pulmonary/Chest:   Intubated   Abdominal: Soft.   Genitourinary:   Genitourinary Comments: Mc cath in place   Neurological:   sedated   Skin: Skin is warm and dry.   Nursing note and vitals reviewed.      Vents:  Vent Mode: A/C (02/08/20 1308)  Ventilator Initiated: Yes (02/06/20 1251)  Set Rate: 14 BPM (02/08/20 1308)  Vt Set: 450 mL (02/08/20 1308)  PEEP/CPAP: 5 cmH20 (02/08/20 1308)  Oxygen Concentration (%): 40 (02/08/20 1308)  Peak Airway Pressure: 25 cmH2O (02/08/20 1308)  Plateau Pressure: 22 cmH20 (02/08/20 1308)  Total Ve: 9.3 mL (02/08/20 1308)  F/VT Ratio<105 (RSBI): (!) (P) 41.3 (02/08/20 1308)    Lines/Drains/Airways      Central Venous Catheter Line                 Percutaneous Central Line Insertion/Assessment - Quad lumen  02/06/20 0742 2 days         Percutaneous Central Line Insertion/Assessment - quad lumen  02/06/20 0742 left internal jugular 2 days         Introducer 02/08/20 0700 right internal jugular less than 1 day         Percutaneous Central Line Insertion/Assessment - double lumen  02/08/20 0700 right internal jugular less than 1 day          Drain                 Chest Tube 02/06/20 1250 Left Mediastinal 2 days         Chest Tube 02/06/20 1250 Right Mediastinal 2 days         Urethral Catheter 02/06/20 0736 Non-latex;Temperature probe 14 Fr. 2 days         NG/OG Tube 02/08/20 1215 nasogastric Right nostril less than 1 day          Airway                 Airway - Non-Surgical 02/06/20 0848 2 days          Arterial Line                 Arterial Line 02/06/20 0736 Left Other (Comment) 2 days          Line                 VAD 02/06/20 1047 Left ventricular assist device HeartMate 3 2 days                Significant Labs:    CBC/Anemia Profile:  Recent Labs   Lab 02/08/20  0407  02/08/20  0820 02/08/20  1202 02/08/20  1301   WBC 19.17*  --  19.09* 20.48*  --    HGB 10.0*  --  9.9* 9.8*  --    HCT 31.4*   < > 31.4* 31.3* 30*     --  189 202  --    MCV 81*  --  83 83  --    RDW 18.6*  --  18.6* 18.5*  --     < > = values in this interval not displayed.        Chemistries:  Recent Labs   Lab 02/06/20  1619  02/07/20  0405  02/08/20  0407 02/08/20  0820 02/08/20  0832 02/08/20  1203   NA  --    < > 136   < > 136 139  --  138   K 4.1   < > 4.6   < > 4.5 4.3  --  4.2   CL  --    < > 99   < > 101 104  --  102   CO2  --    < > 25   < > 21* 22*  --  23   BUN  --    < > 34*   < > 33* 34*  --  36*   CREATININE  --    < > 1.3   < > 1.3 1.2  --  1.3   CALCIUM  --    < > 10.5   < > 9.4 9.4  --  9.4   ALBUMIN  --   --  3.5  --  3.1*  --   --   --    PROT  --   --  7.6  --  7.6  --   --   --    BILITOT  --   --  1.8*  --  1.2*  --    --   --    ALKPHOS  --   --  102  --  95  --   --   --    ALT  --   --  22  --  22  --   --   --    AST  --   --  105*  --  85*  --   --   --    MG 2.4   < >  --   --  2.2  --  2.8* 3.0*   PHOS 4.4  --   --   --   --   --   --   --     < > = values in this interval not displayed.       ABGs:   Recent Labs   Lab 02/08/20  1301   PH 7.474*   PCO2 34.5*   HCO3 25.3   POCSATURATED 100   BE 2     BMP:   Recent Labs   Lab 02/08/20  1203   *      K 4.2      CO2 23   BUN 36*   CREATININE 1.3   CALCIUM 9.4   MG 3.0*     CMP:   Recent Labs   Lab 02/07/20  0405  02/08/20  0407 02/08/20  0820 02/08/20  1203      < > 136 139 138   K 4.6   < > 4.5 4.3 4.2   CL 99   < > 101 104 102   CO2 25   < > 21* 22* 23   *   < > 146* 146* 128*   BUN 34*   < > 33* 34* 36*   CREATININE 1.3   < > 1.3 1.2 1.3   CALCIUM 10.5   < > 9.4 9.4 9.4   PROT 7.6  --  7.6  --   --    ALBUMIN 3.5  --  3.1*  --   --    BILITOT 1.8*  --  1.2*  --   --    ALKPHOS 102  --  95  --   --    *  --  85*  --   --    ALT 22  --  22  --   --    ANIONGAP 12   < > 14 13 13   EGFRNONAA >60.0   < > >60.0 >60.0 >60.0    < > = values in this interval not displayed.     Coagulation:   Recent Labs   Lab 02/08/20  1202   INR 1.2   APTT 28.6     Lactic Acid: No results for input(s): LACTATE in the last 48 hours.  All pertinent labs within the past 24 hours have been reviewed.    Significant Imaging:  I have reviewed all pertinent imaging results/findings within the past 24 hours.

## 2020-02-08 NOTE — PROGRESS NOTES
"[facility]  Endocrinology  Progress Note    Admit Date: 1/15/2020     Reason for Consult: Management of  Hyperglycemia     Surgical Procedure and Date:    ICD implantation 02/15/2019      HPI:   Patient is a 54 y.o. 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 Chronic MARC - Class II-III and mild LE edema.  Admitted to [facility] on Thursday due to severe SOB for a week with associated orthopnea, MARC, and PND unable to lie flat and found to be in acute diastolic heart failure.He was started on CRRT for a few days and tolerated well. Renal u/s was negative for obstruction and liver u/s without findings of cirrhosis. All of this was progression of cardiorenal syndrome with liver congestion from severe volume overload. No dx of DM noted on file. Patient denies history of DM at time of consult. Endocrinology consulted to manage BG during admission to [facility].          Lab Results   Component Value Date    HGBA1C 6.6 (H) 01/16/2020       Interval HPI:    Overnight events:  BG is reasonably controlled overnight on intensive IV insulin infusion protocol. NAEON. Remains intubated. Hypotension noted. Expect increase insulin needs with pressor therapy.     No diet orders on file    Eating:   NPO  Nausea: No  Hypoglycemia and intervention: No  Fever: No  TPN and/or TF: No  If yes, type of TF/TPN and rate: None    BP (!) 82/0 (BP Location: Right arm, Patient Position: Lying)   Pulse 98   Temp 98.1 °F (36.7 °C) (Oral)   Resp 19   Ht 5' 7" (1.702 m)   Wt 86.4 kg (190 lb 7.6 oz)   SpO2 100%   BMI 29.83 kg/m²      Labs Reviewed and Include    Recent Labs   Lab 02/07/20  0405 02/07/20  0800   * 156*   CALCIUM 10.5 10.2   ALBUMIN 3.5  --    PROT 7.6  --     136   K 4.6 4.5   CO2 25 22*   CL 99 101   BUN 34* 33*   CREATININE " 1.3 1.3   ALKPHOS 102  --    ALT 22  --    *  --    BILITOT 1.8*  --      Lab Results   Component Value Date    WBC 17.32 (H) 02/07/2020    HGB 10.4 (L) 02/07/2020    HCT 32.5 (L) 02/07/2020    MCV 81 (L) 02/07/2020     02/07/2020     No results for input(s): TSH, FREET4 in the last 168 hours.  Lab Results   Component Value Date    HGBA1C 6.6 (H) 01/16/2020       Nutritional status:   Body mass index is 29.83 kg/m².  Lab Results   Component Value Date    ALBUMIN 3.5 02/07/2020    ALBUMIN 3.1 (L) 02/06/2020    ALBUMIN 3.9 02/06/2020     Lab Results   Component Value Date    PREALBUMIN 23 01/28/2020    PREALBUMIN 15 (L) 01/22/2020    PREALBUMIN 17 (L) 07/23/2012       Estimated Creatinine Clearance: 67.4 mL/min (based on SCr of 1.3 mg/dL).    Accu-Checks  Recent Labs     02/07/20  0102 02/07/20  0205 02/07/20  0304 02/07/20  0402 02/07/20  0502 02/07/20  0603 02/07/20  0711 02/07/20  0806 02/07/20  0906 02/07/20  1004   POCTGLUCOSE 135* 140* 107 133* 148* 155* 147* 147* 133* 131*       Current Medications and/or Treatments Impacting Glycemic Control  Immunotherapy:    Immunosuppressants     None        Steroids:   Hormones (From admission, onward)    None        Pressors:    Autonomic Drugs (From admission, onward)    Start     Stop Route Frequency Ordered    02/07/20 0300  EPINEPHrine (ADRENALIN) 5 mg in sodium chloride 0.9% 250 mL infusion     Question Answer Comment   Titrate by: (in mcg/kg/min) 0.12    Titrate interval: (in minutes) 5    Titrate to maintain: (SBP or MAP or Cardiac Index) MAP    Greater than: (in mmHg) 65    Maximum dose: (in mcg/kg/min) 2        -- IV Continuous 02/07/20 0254        Hyperglycemia/Diabetes Medications:   Antihyperglycemics (From admission, onward)    Start     Stop Route Frequency Ordered    02/06/20 1400  insulin regular 100 Units in sodium chloride 0.9% 100 mL infusion     Question:  Insulin rate changes (DO NOT MODIFY ANSWER)  Answer:   [email or web address]    -- IV Continuous 02/06/20 1309          ASSESSMENT and PLAN    * Presence of left ventricular assist device (LVAD)  Managed per primary team  Avoid hypoglycemia        Acute hyperglycemia  BG goal 140 - 180     Continue IV insulin infusion protocol  Requires intensive BG monitoring while on protocol     Discharge planning: TBD        Morbid obesity  Body mass index is 29.83 kg/m².  may contribute to insulin resistance            Saad Mayo NP  Endocrinology  [facility]

## 2020-02-08 NOTE — ANESTHESIA POSTPROCEDURE EVALUATION
Anesthesia Post Evaluation    Patient: Saba Lott    Procedure(s) Performed: Procedure(s) (LRB):  CLOSURE, WOUND, STERNUM (N/A)  WASHOUT  INSERTION, GRAFT, PERICARDIUM  APPLICATION, WOUND VAC    Final Anesthesia Type: general    Patient location during evaluation: ICU  Patient participation: No - Unable to Participate, Intubation  Level of consciousness: responds to stimulation  Post-procedure vital signs: reviewed and stable  Pain management: adequate  Airway patency: patent    PONV status at discharge: No PONV  Anesthetic complications: no      Cardiovascular status: blood pressure returned to baseline (on inotropes and vasopressors)  Respiratory status: unassisted  Hydration status: euvolemic  Follow-up not needed.          Vitals Value Taken Time   BP 80/0 2/7/2020  4:30 PM   Temp 36.5 °C (97.7 °F) 2/7/2020  2:00 PM   Pulse 95 2/7/2020  6:44 PM   Resp 17 2/7/2020  6:44 PM   SpO2 100 % 2/7/2020  4:06 PM   Vitals shown include unvalidated device data.      No case tracking events are documented in the log.      Pain/Vicente Score: Pain Rating Post Med Admin: 0 (2/7/2020  3:40 PM)

## 2020-02-08 NOTE — PLAN OF CARE
Plan of Care Note  Cardiothoracic Surgery    Saba Lott is a 54 y.o. male with heart failure who underwent LVAD placement (2/6/20) and chest closure (2/7/20).    Specific issues: epi weaned to 0.07 this AM, dobut, lasix 20, wean nitric to 5 by AM, tube feeds at 10, NGT placement, PICC line and speech consult, 500 diuril qd.     Plan of care for patient was discussed with ICU staff including nurses, residents, and faculty and appropriate consulting services.    Will continue to monitor patient's hemodynamics, functionality, neuro status, fluid status and renal function, and labs and will adjust medications and fluids as necessary while monitoring appropriateness for de-escalation of support and monitoring and transport to stepdown unit.    Benjy Street MD  Cardiothoracic Surgery Fellow

## 2020-02-08 NOTE — ASSESSMENT & PLAN NOTE
-HeartMate 3 Implanted 02/06/2020 as DT. Chest closed 2/7.   -CTS Primary.  -Implanted by Dr. Joshi.  -INR sub therapeutic Coumadin, Goal INR 2.0-3.0. Anticoag per CTS.   -Antiplatelets  mg.  -LDH is stable overall today. Will continue to monitor daily.  -Continues on Epi, , Cardene, Radha.  -CVP elevated. Lasix 20mg/hr. Would rec rebolusing and increasing to 40mg/hr. Can also consider adding Diuril.  -Speed set at 5100, LSL 4700 rpm.  -Not listed for OHTx.  Procedure: Device Interrogation Including analysis of device parameters.  Current Settings: Ventricular Assist Device.  Review of device function is stable/unstable stable.    TXP LVAD INTERROGATIONS 2/8/2020 2/8/2020 2/8/2020 2/8/2020 2/8/2020 2/8/2020 2/8/2020   Type HeartMate3 - - HeartMate3 - - -   Flow 4.4 4.5 4.6 4.4 4.3 4.3 4.3   Speed 5200 5200 5200 5200 5200 5200 5200   PI 3.5 3.8 3.0 3.5 3.5 3.5 3.5   Power (Centeno) 3.7 3.7 3.8 3.8 3.8 3.7 3.8   LSL 4800 - - 4800 - - -   Pulsatility No Pulse - - No Pulse - - -

## 2020-02-08 NOTE — SUBJECTIVE & OBJECTIVE
Interval History: Intubated/sedated. Follows simple commands.     Continuous Infusions:   sodium chloride 0.9% 10 mL/hr at 02/07/20 1632    dextrose 5 % and 0.45 % NaCl with KCl 40 mEq 10 mL/hr at 02/07/20 1632    DOBUTamine 5 mcg/kg/min (02/08/20 0600)    epinephrine 0.08 mcg/kg/min (02/08/20 0600)    furosemide (LASIX) 2 mg/mL infusion (non-titrating) 20 mg/hr (02/08/20 0600)    heparin (porcine) in 5 % dex 200 Units/hr (02/08/20 0600)    insulin (HUMAN R) infusion (adults) 2.4 Units/hr (02/07/20 2200)    nicardipine Stopped (02/07/20 2300)    nitric oxide gas      propofol 40 mcg/kg/min (02/08/20 0628)     Scheduled Meds:   albumin human 5%  25 g Intravenous Once    aspirin  325 mg Oral Daily    aspirin  325 mg Per NG tube Daily    ceFEPime (MAXIPIME) IVPB  2 g Intravenous Q8H    chlorothiazide (DIURIL) IVPB  500 mg Intravenous Once    docusate sodium  100 mg Oral BID    ferrous gluconate  324 mg Oral Daily with breakfast    mupirocin   Nasal BID    pantoprazole  40 mg Intravenous Daily    polyethylene glycol  17 g Oral Daily    vancomycin (VANCOCIN) IVPB  1,250 mg Intravenous Q24H     PRN Meds:albumin human 5%, albuterol sulfate, bisacodyL, Dextrose 10% Bolus, Dextrose 10% Bolus, Dextrose 10% Bolus, Dextrose 10% Bolus, fentaNYL, hydrALAZINE, magnesium hydroxide 400 mg/5 ml, magnesium sulfate IVPB, oxyCODONE, oxyCODONE, potassium chloride, potassium chloride, sodium chloride 0.9%    Review of patient's allergies indicates:   Allergen Reactions    Iodine and iodide containing products      Objective:     Vital Signs (Most Recent):  Temp: 98.4 °F (36.9 °C) (02/08/20 0700)  Pulse: 100 (02/08/20 0903)  Resp: 20 (02/08/20 0903)  BP: (!) 82/0 (02/08/20 0800)  SpO2: 100 % (02/07/20 2347) Vital Signs (24h Range):  Temp:  [97.6 °F (36.4 °C)-98.4 °F (36.9 °C)] 98.4 °F (36.9 °C)  Pulse:  [] 100  Resp:  [16-24] 20  SpO2:  [99 %-100 %] 100 %  BP: (80-84)/(0) 82/0  Arterial Line BP:  ()/() 90/75     Patient Vitals for the past 72 hrs (Last 3 readings):   Weight   02/07/20 0429 86.4 kg (190 lb 7.6 oz)   02/06/20 0300 84.6 kg (186 lb 8.2 oz)   02/05/20 1100 87.9 kg (193 lb 12.6 oz)     Body mass index is 29.83 kg/m².      Intake/Output Summary (Last 24 hours) at 2/8/2020 0927  Last data filed at 2/8/2020 0900  Gross per 24 hour   Intake 2839.6 ml   Output 2860 ml   Net -20.4 ml     PAP: (23-29)/(12-20) 28/19  PAP (Mean):  [17 mmHg-24 mmHg] 23 mmHg  Physical Exam   Constitutional: He appears well-developed and well-nourished. He is sedated and intubated.   HENT:   Head: Normocephalic and atraumatic.   Eyes: Pupils are equal, round, and reactive to light. EOM are normal.   Neck:   RIJ cordis/PA   LIJ Quad lumen   Cardiovascular: Normal rate and regular rhythm.   VAD hum.   Pulmonary/Chest: Effort normal and breath sounds normal. He is intubated. No respiratory distress. He has no wheezes. He has no rales.   Abdominal: Soft. Bowel sounds are normal. He exhibits distension. There is no tenderness.   Musculoskeletal: Normal range of motion.   Neurological: No cranial nerve deficit.   Skin: Skin is warm and dry. Capillary refill takes less than 2 seconds.     Significant Labs:  CBC:  Recent Labs   Lab 02/07/20  2357  02/08/20  0407 02/08/20  0718 02/08/20  0820   WBC 18.86*  --  19.17*  --  19.09*   RBC 3.82*  --  3.86*  --  3.78*   HGB 10.0*  --  10.0*  --  9.9*   HCT 31.5*   < > 31.4* 33* 31.4*     --  209  --  189   MCV 83  --  81*  --  83   MCH 26.2*  --  25.9*  --  26.2*   MCHC 31.7*  --  31.8*  --  31.5*    < > = values in this interval not displayed.     BNP:  Recent Labs   Lab 02/02/20  1511 02/05/20  0300 02/06/20  0334   BNP 3,069* 1,865* 1,420*     CMP:  Recent Labs   Lab 02/06/20  1248  02/07/20  0405  02/07/20  2357 02/08/20  0407 02/08/20  0820   *   < > 131*   < > 123* 146* 146*   CALCIUM 11.2*   < > 10.5   < > 9.5 9.4 9.4   ALBUMIN 3.1*  --  3.5  --   --  3.1*   --    PROT 7.3  --  7.6  --   --  7.6  --    *   < > 136   < > 138 136 139   K 3.4*   < > 4.6   < > 4.6 4.5 4.3   CO2 26   < > 25   < > 24 21* 22*   CL 93*   < > 99   < > 103 101 104   BUN 41*   < > 34*   < > 31* 33* 34*   CREATININE 1.5*   < > 1.3   < > 1.2 1.3 1.2   ALKPHOS 113  --  102  --   --  95  --    ALT 22  --  22  --   --  22  --    AST 72*  --  105*  --   --  85*  --    BILITOT 1.8*  --  1.8*  --   --  1.2*  --     < > = values in this interval not displayed.      Coagulation:   Recent Labs   Lab 02/07/20  2357 02/08/20  0407 02/08/20  0820   INR 1.3* 1.2 1.2   APTT 32.7* 31.8  31.8 29.2     LDH:  Recent Labs   Lab 02/06/20  1250 02/07/20  0800 02/08/20  0407   * 562* 527*     Microbiology:  Microbiology Results (last 7 days)     Procedure Component Value Units Date/Time    Blood culture [[id number]] Collected:  02/03/20 0946    Order Status:  Completed Specimen:  Blood from Peripheral, Hand, Right Updated:  02/07/20 1022     Blood Culture, Routine No Growth to date      No Growth to date      No Growth to date      No Growth to date      No Growth to date    Blood culture [[id number]] Collected:  02/03/20 0946    Order Status:  Completed Specimen:  Blood from Peripheral, Hand, Right Updated:  02/07/20 1022     Blood Culture, Routine No Growth to date      No Growth to date      No Growth to date      No Growth to date      No Growth to date        I have reviewed all pertinent labs within the past 24 hours.    Estimated Creatinine Clearance: 73 mL/min (based on SCr of 1.2 mg/dL).    Diagnostic Results:  I have reviewed and interpreted all pertinent imaging results/findings within the past 24 hours.

## 2020-02-08 NOTE — PROCEDURES
"Saba Lott is a 54 y.o. male patient.    Temp: 98.3 °F (36.8 °C) (02/08/20 1100)  Pulse: 100 (02/08/20 1514)  Resp: 17 (02/08/20 1514)  BP: (!) 86/0 (02/08/20 1518)  SpO2: 100 % (02/07/20 2347)  Weight: 86.4 kg (190 lb 7.6 oz) (02/07/20 0429)  Height: 5' 7" (170.2 cm) (02/05/20 1100)    PICC  Date/Time: 2/8/2020 3:31 PM  Performed by: Monalisa Larios RN  Assisting provider: Lois Cassidy LPN  Consent Done: Yes  Time out: Immediately prior to procedure a time out was called to verify the correct patient, procedure, equipment, support staff and site/side marked as required  Indications: med administration and vascular access  Anesthesia: local infiltration  Local anesthetic: lidocaine 1% without epinephrine  Anesthetic Total (mL): 2  Preparation: skin prepped with ChloraPrep  Skin prep agent dried: skin prep agent completely dried prior to procedure  Sterile barriers: all five maximum sterile barriers used - cap, mask, sterile gown, sterile gloves, and large sterile sheet  Hand hygiene: hand hygiene performed prior to central venous catheter insertion  Location details: right basilic  Catheter type: triple lumen  Catheter size: 5 Fr  Catheter Length: 39cm    Ultrasound guidance: yes  Vessel Caliber: medium and patent, compressibility normal  Vascular Doppler: not done  Needle advanced into vessel with real time Ultrasound guidance.  Guidewire confirmed in vessel.  Image recorded and saved.  Sterile sheath used.  Number of attempts: 1  Post-procedure: blood return through all ports, chlorhexidine patch and sterile dressing applied  Technical procedures used: 3GC  Specimens: No  Implants: No  Assessment: placement verified by x-ray  Complications: none          Lois Cassidy  2/8/2020  "

## 2020-02-08 NOTE — PROGRESS NOTES
02/08/2020  Yudith Mcmanus    Current provider:  David Joshi MD      I, Yudith Mcmanus, rounded on Saba Oren to ensure all mechanical assist device settings (IABP or VAD) were appropriate and all parameters were within limits.  I was able to ensure all back up equipment was present, the staff had no issues, and the Perfusion Department daily rounding was complete.    8:57 AM

## 2020-02-08 NOTE — ASSESSMENT & PLAN NOTE
BG goal 140 - 180     BG is now within or below goal. Patient has fallen off of intensive IV insulin infusion overnight.     - Discontinue IIP  - Start Moderate Dose SQ Insulin Correction Scale. Patient on vasopressor therapy, and is scheduled to start TF tomorrow according to nursing staff.   - BG Monitoring every 4 hours while NPO.     ** Please call Endocrine for any BG related issues **  ** Please notify Endocrine for any change and/or advance in diet**    Discharge Recommendations:    TBD. Please notify endocrinology prior to discharge.

## 2020-02-08 NOTE — PROGRESS NOTES
[facility]  Critical Care - Surgery  Progress Note    Patient Name: Saba Lott  MRN: [id number]  Admission Date: 1/15/2020  Hospital Length of Stay: 24 days  Code Status: Prior  Attending Provider: David Joshi MD  Primary Care Provider: Primary Doctor No   Principal Problem: Presence of left ventricular assist device (LVAD)    Subjective:     Hospital/ICU Course:  No notes on file    Interval History/Significant Events:     POD1 chest closure.  No issues overnight.  MAPs 78-82  CVP 12 - 19 now 18  CT with R med 75, L med 120 output  On epi 0.08 and  5  Radha 15  Lasix gtt 20 with 2.4 L out in 24 hours    Follow-up For: Procedure(s) (LRB):  CLOSURE, WOUND, STERNUM (N/A)  INSERTION-RIGHT VENTRICULAR ASSIST DEVICE (Right)    Post-Operative Day: Day of Surgery    Objective:     Vital Signs (Most Recent):  Temp: 98.3 °F (36.8 °C) (02/08/20 1100)  Pulse: 97 (02/08/20 1308)  Resp: 19 (02/08/20 1308)  BP: (!) 88/0 (02/08/20 1146)  SpO2: 100 % (02/07/20 2347) Vital Signs (24h Range):  Temp:  [97.6 °F (36.4 °C)-98.4 °F (36.9 °C)] 98.3 °F (36.8 °C)  Pulse:  [] 97  Resp:  [16-24] 19  SpO2:  [99 %-100 %] 100 %  BP: (80-88)/(0) 88/0  Arterial Line BP: ()/() 86/70     Weight: 86.4 kg (190 lb 7.6 oz)  Body mass index is 29.83 kg/m².      Intake/Output Summary (Last 24 hours) at 2/8/2020 1335  Last data filed at 2/8/2020 1300  Gross per 24 hour   Intake 2389.6 ml   Output 2795 ml   Net -405.4 ml       Physical Exam   Constitutional: He appears well-developed and well-nourished.   HENT:   Head: Normocephalic and atraumatic.   Intubated   Cardiovascular: Normal rate.   LVAD in place  Right and left mediastinal chest tubes ss output  Tom and cordis in place   Pulmonary/Chest:   Intubated   Abdominal: Soft.   Genitourinary:   Genitourinary Comments: Mc cath in place   Neurological:   sedated   Skin: Skin is warm and dry.   Nursing note and vitals reviewed.      Vents:  Vent Mode: A/C  (02/08/20 1308)  Ventilator Initiated: Yes (02/06/20 1251)  Set Rate: 14 BPM (02/08/20 1308)  Vt Set: 450 mL (02/08/20 1308)  PEEP/CPAP: 5 cmH20 (02/08/20 1308)  Oxygen Concentration (%): 40 (02/08/20 1308)  Peak Airway Pressure: 25 cmH2O (02/08/20 1308)  Plateau Pressure: 22 cmH20 (02/08/20 1308)  Total Ve: 9.3 mL (02/08/20 1308)  F/VT Ratio<105 (RSBI): (!) (P) 41.3 (02/08/20 1308)    Lines/Drains/Airways     Central Venous Catheter Line                 Percutaneous Central Line Insertion/Assessment - Quad lumen  02/06/20 0742 2 days         Percutaneous Central Line Insertion/Assessment - quad lumen  02/06/20 0742 left internal jugular 2 days         Introducer 02/08/20 0700 right internal jugular less than 1 day         Percutaneous Central Line Insertion/Assessment - double lumen  02/08/20 0700 right internal jugular less than 1 day          Drain                 Chest Tube 02/06/20 1250 Left Mediastinal 2 days         Chest Tube 02/06/20 1250 Right Mediastinal 2 days         Urethral Catheter 02/06/20 0736 Non-latex;Temperature probe 14 Fr. 2 days         NG/OG Tube 02/08/20 1215 nasogastric Right nostril less than 1 day          Airway                 Airway - Non-Surgical 02/06/20 0848 2 days          Arterial Line                 Arterial Line 02/06/20 0736 Left Other (Comment) 2 days          Line                 VAD 02/06/20 1047 Left ventricular assist device HeartMate 3 2 days                Significant Labs:    CBC/Anemia Profile:  Recent Labs   Lab 02/08/20  0407  02/08/20  0820 02/08/20  1202 02/08/20  1301   WBC 19.17*  --  19.09* 20.48*  --    HGB 10.0*  --  9.9* 9.8*  --    HCT 31.4*   < > 31.4* 31.3* 30*     --  189 202  --    MCV 81*  --  83 83  --    RDW 18.6*  --  18.6* 18.5*  --     < > = values in this interval not displayed.        Chemistries:  Recent Labs   Lab 02/06/20  1619  02/07/20  0405  02/08/20  0407 02/08/20  0820 02/08/20  0832 02/08/20  1203   NA  --    < > 136   < > 136  139  --  138   K 4.1   < > 4.6   < > 4.5 4.3  --  4.2   CL  --    < > 99   < > 101 104  --  102   CO2  --    < > 25   < > 21* 22*  --  23   BUN  --    < > 34*   < > 33* 34*  --  36*   CREATININE  --    < > 1.3   < > 1.3 1.2  --  1.3   CALCIUM  --    < > 10.5   < > 9.4 9.4  --  9.4   ALBUMIN  --   --  3.5  --  3.1*  --   --   --    PROT  --   --  7.6  --  7.6  --   --   --    BILITOT  --   --  1.8*  --  1.2*  --   --   --    ALKPHOS  --   --  102  --  95  --   --   --    ALT  --   --  22  --  22  --   --   --    AST  --   --  105*  --  85*  --   --   --    MG 2.4   < >  --   --  2.2  --  2.8* 3.0*   PHOS 4.4  --   --   --   --   --   --   --     < > = values in this interval not displayed.       ABGs:   Recent Labs   Lab 02/08/20  1301   PH 7.474*   PCO2 34.5*   HCO3 25.3   POCSATURATED 100   BE 2     BMP:   Recent Labs   Lab 02/08/20  1203   *      K 4.2      CO2 23   BUN 36*   CREATININE 1.3   CALCIUM 9.4   MG 3.0*     CMP:   Recent Labs   Lab 02/07/20  0405  02/08/20  0407 02/08/20  0820 02/08/20  1203      < > 136 139 138   K 4.6   < > 4.5 4.3 4.2   CL 99   < > 101 104 102   CO2 25   < > 21* 22* 23   *   < > 146* 146* 128*   BUN 34*   < > 33* 34* 36*   CREATININE 1.3   < > 1.3 1.2 1.3   CALCIUM 10.5   < > 9.4 9.4 9.4   PROT 7.6  --  7.6  --   --    ALBUMIN 3.5  --  3.1*  --   --    BILITOT 1.8*  --  1.2*  --   --    ALKPHOS 102  --  95  --   --    *  --  85*  --   --    ALT 22  --  22  --   --    ANIONGAP 12   < > 14 13 13   EGFRNONAA >60.0   < > >60.0 >60.0 >60.0    < > = values in this interval not displayed.     Coagulation:   Recent Labs   Lab 02/08/20  1202   INR 1.2   APTT 28.6     Lactic Acid: No results for input(s): LACTATE in the last 48 hours.  All pertinent labs within the past 24 hours have been reviewed.    Significant Imaging:  I have reviewed all pertinent imaging results/findings within the past 24 hours.    Assessment/Plan:     Non-ischemic  cardiomyopathy  Plan:    Neuro:   -Pain control: prn fentanyl 25 q1  -Sedation propofol  -Daily sedation vacations    Pulmonary:   -intubated  Vent Mode: A/C  Oxygen Concentration (%):  [40] 40  Resp Rate Total:  [16 br/min-24 br/min] 20 br/min  Vt Set:  [450 mL] 450 mL  PEEP/CPAP:  [5 cmH20] 5 cmH20  Mean Airway Pressure:  [9.4 dtM99-03 cmH20] 11 cmH20  -Radha to 7.5, take to 5 late tonight for CPAP trial early AM and possible extubation on CPAP 10/5   -Daily SBT    Cardiac:  -MAP goal >65  -Epi 0.07,  5  -LVAD speed 5200, flows 4.2 - 4.6  -LDH stable, continue to monitor  -CT output monitor every hour  -d/c Tom   -PICC consult, if PICC placed pull cordis    Renal:   -Mc in place  -UOP adequate  -Bun/Cr 33/1.3  -lasix gtt at 20  -diuril 500 daily x 2 days (ends 2/9)    Fluids/Electrolytes/Nutrition/GI:   -Nutritional status: NPO  -replace lytes PRN  -place NGT, set to LIWS  -bowel regimen: Miralax and docusate  -holding TF until tomorrow after extubation  -SLP c/s    Hematology/Oncology:  -H/H stable  -INR/Plts 1.2/209  -Trend CBC  -   Infectious Disease:   -Afebrile  -WBC 19  -Abx: vanc and ancef    Endocrine:  -Glucose goal of 120-180  -Insulin gtt at 0.5  -endo on board    Dispo:  -Continue care in the ICU setting. Wean Radha for potential extubation tomorrow morning. PICC consult, d/c Tom and if PICC placed d/c cordis, continue diuresis now on lasix gtt and diuril, weaning epi slowly, NGT to be placed today.    Critical care was time spent personally by me on the following activities: development of treatment plan with patient or surrogate and bedside caregivers, discussions with consultants, evaluation of patient's response to treatment, examination of patient, ordering and performing treatments and interventions, ordering and review of laboratory studies, ordering and review of radiographic studies, pulse oximetry, re-evaluation of patient's condition.  This critical care time did not overlap with that  of any other provider or involve time for any procedures.     Ted Feldman MD  Critical Care - Surgery  [facility]

## 2020-02-08 NOTE — SUBJECTIVE & OBJECTIVE
"Interval HPI:   Overnight events:      BG is now below goal. Patient has fallen off of intensive IV insulin infusion overnight. Patient has no history of DM.   No diet orders on file      Eating:   NPO  Nausea: No  Hypoglycemia and intervention: No  Fever: No  TPN and/or TF: No  If yes, type of TF/TPN and rate:   None    BP (!) 82/0   Pulse 100   Temp 98.4 °F (36.9 °C) (Oral)   Resp 20   Ht 5' 7" (1.702 m)   Wt 86.4 kg (190 lb 7.6 oz)   SpO2 100%   BMI 29.83 kg/m²     Labs Reviewed and Include    Recent Labs   Lab 02/08/20  0407   *   CALCIUM 9.4   ALBUMIN 3.1*   PROT 7.6      K 4.5   CO2 21*      BUN 33*   CREATININE 1.3   ALKPHOS 95   ALT 22   AST 85*   BILITOT 1.2*     Lab Results   Component Value Date    WBC 19.09 (H) 02/08/2020    HGB 9.9 (L) 02/08/2020    HCT 31.4 (L) 02/08/2020    MCV 83 02/08/2020     02/08/2020     No results for input(s): TSH, FREET4 in the last 168 hours.  Lab Results   Component Value Date    HGBA1C 6.6 (H) 01/16/2020       Nutritional status:   Body mass index is 29.83 kg/m².  Lab Results   Component Value Date    ALBUMIN 3.1 (L) 02/08/2020    ALBUMIN 3.5 02/07/2020    ALBUMIN 3.1 (L) 02/06/2020     Lab Results   Component Value Date    PREALBUMIN 14 (L) 02/07/2020    PREALBUMIN 23 01/28/2020    PREALBUMIN 15 (L) 01/22/2020       Estimated Creatinine Clearance: 67.4 mL/min (based on SCr of 1.3 mg/dL).    Accu-Checks  Recent Labs     02/08/20  0000 02/08/20  0100 02/08/20  0226 02/08/20  0311 02/08/20  0406 02/08/20  0501 02/08/20  0601 02/08/20  0711 02/08/20  0825 02/08/20  0912   POCTGLUCOSE 117* 128* 121* 120* 123* 141* 132* 128* 136* 156*       Current Medications and/or Treatments Impacting Glycemic Control  Immunotherapy:    Immunosuppressants     None        Steroids:   Hormones (From admission, onward)    None        Pressors:    Autonomic Drugs (From admission, onward)    Start     Stop Route Frequency Ordered    02/07/20 0300  EPINEPHrine " (ADRENALIN) 5 mg in sodium chloride 0.9% 250 mL infusion     Question Answer Comment   Titrate by: (in mcg/kg/min) 0.12    Titrate interval: (in minutes) 5    Titrate to maintain: (SBP or MAP or Cardiac Index) MAP    Greater than: (in mmHg) 65    Maximum dose: (in mcg/kg/min) 2        -- IV Continuous 02/07/20 0254        Hyperglycemia/Diabetes Medications:   Antihyperglycemics (From admission, onward)    Start     Stop Route Frequency Ordered    02/06/20 1400  insulin regular 100 Units in sodium chloride 0.9% 100 mL infusion     Question:  Insulin rate changes (DO NOT MODIFY ANSWER)  Answer:  [email or web address]    -- IV Continuous 02/06/20 1304

## 2020-02-09 LAB
ALBUMIN SERPL BCP-MCNC: 2.9 G/DL (ref 3.5–5.2)
ALLENS TEST: ABNORMAL
ALLENS TEST: NORMAL
ALP SERPL-CCNC: 99 U/L (ref 55–135)
ALT SERPL W/O P-5'-P-CCNC: 20 U/L (ref 10–44)
ANION GAP SERPL CALC-SCNC: 11 MMOL/L (ref 8–16)
ANION GAP SERPL CALC-SCNC: 11 MMOL/L (ref 8–16)
ANION GAP SERPL CALC-SCNC: 13 MMOL/L (ref 8–16)
ANION GAP SERPL CALC-SCNC: 14 MMOL/L (ref 8–16)
APTT BLDCRRT: 28.9 SEC (ref 21–32)
APTT BLDCRRT: 30.6 SEC (ref 21–32)
APTT BLDCRRT: 30.9 SEC (ref 21–32)
APTT BLDCRRT: 31.3 SEC (ref 21–32)
APTT BLDCRRT: 33.8 SEC (ref 21–32)
APTT BLDCRRT: 33.8 SEC (ref 21–32)
AST SERPL-CCNC: 52 U/L (ref 10–40)
BASOPHILS # BLD AUTO: 0.04 K/UL (ref 0–0.2)
BASOPHILS # BLD AUTO: 0.05 K/UL (ref 0–0.2)
BASOPHILS # BLD AUTO: 0.06 K/UL (ref 0–0.2)
BASOPHILS NFR BLD: 0.2 % (ref 0–1.9)
BASOPHILS NFR BLD: 0.3 % (ref 0–1.9)
BASOPHILS NFR BLD: 0.3 % (ref 0–1.9)
BILIRUB DIRECT SERPL-MCNC: 0.9 MG/DL (ref 0.1–0.3)
BILIRUB SERPL-MCNC: 1.1 MG/DL (ref 0.1–1)
BUN SERPL-MCNC: 40 MG/DL (ref 6–20)
BUN SERPL-MCNC: 41 MG/DL (ref 6–20)
BUN SERPL-MCNC: 42 MG/DL (ref 6–20)
BUN SERPL-MCNC: 43 MG/DL (ref 6–20)
BUN SERPL-MCNC: 44 MG/DL (ref 6–20)
BUN SERPL-MCNC: 46 MG/DL (ref 6–20)
CALCIUM SERPL-MCNC: 9.3 MG/DL (ref 8.7–10.5)
CALCIUM SERPL-MCNC: 9.6 MG/DL (ref 8.7–10.5)
CALCIUM SERPL-MCNC: 9.7 MG/DL (ref 8.7–10.5)
CALCIUM SERPL-MCNC: 9.7 MG/DL (ref 8.7–10.5)
CHLORIDE SERPL-SCNC: 101 MMOL/L (ref 95–110)
CHLORIDE SERPL-SCNC: 102 MMOL/L (ref 95–110)
CHLORIDE SERPL-SCNC: 103 MMOL/L (ref 95–110)
CO2 SERPL-SCNC: 24 MMOL/L (ref 23–29)
CO2 SERPL-SCNC: 24 MMOL/L (ref 23–29)
CO2 SERPL-SCNC: 25 MMOL/L (ref 23–29)
CO2 SERPL-SCNC: 25 MMOL/L (ref 23–29)
CO2 SERPL-SCNC: 26 MMOL/L (ref 23–29)
CO2 SERPL-SCNC: 26 MMOL/L (ref 23–29)
CREAT SERPL-MCNC: 1.3 MG/DL (ref 0.5–1.4)
CREAT SERPL-MCNC: 1.4 MG/DL (ref 0.5–1.4)
CREAT SERPL-MCNC: 1.4 MG/DL (ref 0.5–1.4)
DIFFERENTIAL METHOD: ABNORMAL
EOSINOPHIL # BLD AUTO: 0.5 K/UL (ref 0–0.5)
EOSINOPHIL # BLD AUTO: 0.5 K/UL (ref 0–0.5)
EOSINOPHIL # BLD AUTO: 0.6 K/UL (ref 0–0.5)
EOSINOPHIL NFR BLD: 2.6 % (ref 0–8)
EOSINOPHIL NFR BLD: 2.8 % (ref 0–8)
EOSINOPHIL NFR BLD: 2.8 % (ref 0–8)
ERYTHROCYTE [DISTWIDTH] IN BLOOD BY AUTOMATED COUNT: 18.2 % (ref 11.5–14.5)
ERYTHROCYTE [DISTWIDTH] IN BLOOD BY AUTOMATED COUNT: 18.3 % (ref 11.5–14.5)
ERYTHROCYTE [DISTWIDTH] IN BLOOD BY AUTOMATED COUNT: 18.3 % (ref 11.5–14.5)
EST. GFR  (AFRICAN AMERICAN): >60 ML/MIN/1.73 M^2
EST. GFR  (NON AFRICAN AMERICAN): 56.2 ML/MIN/1.73 M^2
EST. GFR  (NON AFRICAN AMERICAN): 56.2 ML/MIN/1.73 M^2
EST. GFR  (NON AFRICAN AMERICAN): >60 ML/MIN/1.73 M^2
GLUCOSE SERPL-MCNC: 127 MG/DL (ref 70–110)
GLUCOSE SERPL-MCNC: 129 MG/DL (ref 70–110)
GLUCOSE SERPL-MCNC: 133 MG/DL (ref 70–110)
GLUCOSE SERPL-MCNC: 135 MG/DL (ref 70–110)
GLUCOSE SERPL-MCNC: 136 MG/DL (ref 70–110)
GLUCOSE SERPL-MCNC: 146 MG/DL (ref 70–110)
HCO3 UR-SCNC: 25.6 MMOL/L (ref 24–28)
HCO3 UR-SCNC: 26.4 MMOL/L (ref 24–28)
HCO3 UR-SCNC: 26.7 MMOL/L (ref 24–28)
HCO3 UR-SCNC: 27.7 MMOL/L (ref 24–28)
HCT VFR BLD AUTO: 30.4 % (ref 40–54)
HCT VFR BLD AUTO: 30.7 % (ref 40–54)
HCT VFR BLD AUTO: 30.9 % (ref 40–54)
HCT VFR BLD CALC: 30 %PCV (ref 36–54)
HCT VFR BLD CALC: 30 %PCV (ref 36–54)
HGB BLD-MCNC: 9.5 G/DL (ref 14–18)
HGB BLD-MCNC: 9.7 G/DL (ref 14–18)
HGB BLD-MCNC: 9.8 G/DL (ref 14–18)
IMM GRANULOCYTES # BLD AUTO: 0.26 K/UL (ref 0–0.04)
IMM GRANULOCYTES # BLD AUTO: 0.27 K/UL (ref 0–0.04)
IMM GRANULOCYTES # BLD AUTO: 0.33 K/UL (ref 0–0.04)
IMM GRANULOCYTES NFR BLD AUTO: 1.3 % (ref 0–0.5)
IMM GRANULOCYTES NFR BLD AUTO: 1.5 % (ref 0–0.5)
IMM GRANULOCYTES NFR BLD AUTO: 1.8 % (ref 0–0.5)
INR PPP: 1.1 (ref 0.8–1.2)
INR PPP: 1.1 (ref 0.8–1.2)
INR PPP: 1.2 (ref 0.8–1.2)
LDH SERPL L TO P-CCNC: 0.75 MMOL/L (ref 0.36–1.25)
LDH SERPL L TO P-CCNC: 0.79 MMOL/L (ref 0.36–1.25)
LDH SERPL L TO P-CCNC: 1.05 MMOL/L (ref 0.36–1.25)
LDH SERPL L TO P-CCNC: 488 U/L (ref 110–260)
LYMPHOCYTES # BLD AUTO: 0.8 K/UL (ref 1–4.8)
LYMPHOCYTES # BLD AUTO: 0.9 K/UL (ref 1–4.8)
LYMPHOCYTES # BLD AUTO: 0.9 K/UL (ref 1–4.8)
LYMPHOCYTES NFR BLD: 4.5 % (ref 18–48)
LYMPHOCYTES NFR BLD: 4.7 % (ref 18–48)
LYMPHOCYTES NFR BLD: 4.8 % (ref 18–48)
MAGNESIUM SERPL-MCNC: 2.5 MG/DL (ref 1.6–2.6)
MAGNESIUM SERPL-MCNC: 2.6 MG/DL (ref 1.6–2.6)
MCH RBC QN AUTO: 25.6 PG (ref 27–31)
MCH RBC QN AUTO: 25.7 PG (ref 27–31)
MCH RBC QN AUTO: 26.1 PG (ref 27–31)
MCHC RBC AUTO-ENTMCNC: 31.3 G/DL (ref 32–36)
MCHC RBC AUTO-ENTMCNC: 31.4 G/DL (ref 32–36)
MCHC RBC AUTO-ENTMCNC: 31.9 G/DL (ref 32–36)
MCV RBC AUTO: 82 FL (ref 82–98)
METHEMOGLOBIN: 0.8 % (ref 0–3)
MONOCYTES # BLD AUTO: 1.2 K/UL (ref 0.3–1)
MONOCYTES # BLD AUTO: 1.4 K/UL (ref 0.3–1)
MONOCYTES # BLD AUTO: 1.5 K/UL (ref 0.3–1)
MONOCYTES NFR BLD: 6.8 % (ref 4–15)
MONOCYTES NFR BLD: 7.4 % (ref 4–15)
MONOCYTES NFR BLD: 7.5 % (ref 4–15)
NEUTROPHILS # BLD AUTO: 15.4 K/UL (ref 1.8–7.7)
NEUTROPHILS # BLD AUTO: 15.4 K/UL (ref 1.8–7.7)
NEUTROPHILS # BLD AUTO: 16.1 K/UL (ref 1.8–7.7)
NEUTROPHILS NFR BLD: 83.2 % (ref 38–73)
NEUTROPHILS NFR BLD: 83.5 % (ref 38–73)
NEUTROPHILS NFR BLD: 84 % (ref 38–73)
NRBC BLD-RTO: 0 /100 WBC
PCO2 BLDA: 35.7 MMHG (ref 35–45)
PCO2 BLDA: 36.4 MMHG (ref 35–45)
PCO2 BLDA: 36.4 MMHG (ref 35–45)
PCO2 BLDA: 37.8 MMHG (ref 35–45)
PH SMN: 7.46 [PH] (ref 7.35–7.45)
PH SMN: 7.47 [PH] (ref 7.35–7.45)
PH SMN: 7.47 [PH] (ref 7.35–7.45)
PH SMN: 7.48 [PH] (ref 7.35–7.45)
PLATELET # BLD AUTO: 196 K/UL (ref 150–350)
PLATELET # BLD AUTO: 211 K/UL (ref 150–350)
PLATELET # BLD AUTO: 219 K/UL (ref 150–350)
PMV BLD AUTO: 9.1 FL (ref 9.2–12.9)
PMV BLD AUTO: 9.5 FL (ref 9.2–12.9)
PMV BLD AUTO: 9.9 FL (ref 9.2–12.9)
PO2 BLDA: 190 MMHG (ref 80–100)
PO2 BLDA: 199 MMHG (ref 80–100)
PO2 BLDA: 204 MMHG (ref 80–100)
PO2 BLDA: 206 MMHG (ref 80–100)
POC BE: 2 MMOL/L
POC BE: 3 MMOL/L
POC BE: 3 MMOL/L
POC BE: 4 MMOL/L
POC IONIZED CALCIUM: 1.1 MMOL/L (ref 1.06–1.42)
POC IONIZED CALCIUM: 1.1 MMOL/L (ref 1.06–1.42)
POC SATURATED O2: 100 % (ref 95–100)
POC TCO2: 27 MMOL/L (ref 23–27)
POC TCO2: 28 MMOL/L (ref 23–27)
POC TCO2: 28 MMOL/L (ref 23–27)
POC TCO2: 29 MMOL/L (ref 23–27)
POCT GLUCOSE: 122 MG/DL (ref 70–110)
POCT GLUCOSE: 128 MG/DL (ref 70–110)
POCT GLUCOSE: 131 MG/DL (ref 70–110)
POCT GLUCOSE: 132 MG/DL (ref 70–110)
POCT GLUCOSE: 147 MG/DL (ref 70–110)
POTASSIUM BLD-SCNC: 3.9 MMOL/L (ref 3.5–5.1)
POTASSIUM BLD-SCNC: 4.1 MMOL/L (ref 3.5–5.1)
POTASSIUM SERPL-SCNC: 3.5 MMOL/L (ref 3.5–5.1)
POTASSIUM SERPL-SCNC: 3.8 MMOL/L (ref 3.5–5.1)
POTASSIUM SERPL-SCNC: 4.1 MMOL/L (ref 3.5–5.1)
POTASSIUM SERPL-SCNC: 4.2 MMOL/L (ref 3.5–5.1)
POTASSIUM SERPL-SCNC: 4.3 MMOL/L (ref 3.5–5.1)
POTASSIUM SERPL-SCNC: 4.4 MMOL/L (ref 3.5–5.1)
PROT SERPL-MCNC: 7.8 G/DL (ref 6–8.4)
PROTHROMBIN TIME: 11.7 SEC (ref 9–12.5)
PROTHROMBIN TIME: 11.7 SEC (ref 9–12.5)
PROTHROMBIN TIME: 11.8 SEC (ref 9–12.5)
RBC # BLD AUTO: 3.69 M/UL (ref 4.6–6.2)
RBC # BLD AUTO: 3.75 M/UL (ref 4.6–6.2)
RBC # BLD AUTO: 3.79 M/UL (ref 4.6–6.2)
SAMPLE: ABNORMAL
SAMPLE: NORMAL
SITE: ABNORMAL
SITE: NORMAL
SODIUM BLD-SCNC: 137 MMOL/L (ref 136–145)
SODIUM BLD-SCNC: 139 MMOL/L (ref 136–145)
SODIUM SERPL-SCNC: 138 MMOL/L (ref 136–145)
SODIUM SERPL-SCNC: 138 MMOL/L (ref 136–145)
SODIUM SERPL-SCNC: 139 MMOL/L (ref 136–145)
SODIUM SERPL-SCNC: 140 MMOL/L (ref 136–145)
SODIUM SERPL-SCNC: 141 MMOL/L (ref 136–145)
SODIUM SERPL-SCNC: 141 MMOL/L (ref 136–145)
WBC # BLD AUTO: 18.35 K/UL (ref 3.9–12.7)
WBC # BLD AUTO: 18.52 K/UL (ref 3.9–12.7)
WBC # BLD AUTO: 19.33 K/UL (ref 3.9–12.7)

## 2020-02-09 PROCEDURE — 83605 ASSAY OF LACTIC ACID: CPT

## 2020-02-09 PROCEDURE — 94761 N-INVAS EAR/PLS OXIMETRY MLT: CPT

## 2020-02-09 PROCEDURE — 83615 LACTATE (LD) (LDH) ENZYME: CPT

## 2020-02-09 PROCEDURE — 63600175 PHARM REV CODE 636 W HCPCS: Performed by: SURGERY

## 2020-02-09 PROCEDURE — 99900026 HC AIRWAY MAINTENANCE (STAT)

## 2020-02-09 PROCEDURE — 85025 COMPLETE CBC W/AUTO DIFF WBC: CPT | Mod: 91

## 2020-02-09 PROCEDURE — 93750 INTERROGATION VAD IN PERSON: CPT | Mod: ,,, | Performed by: INTERNAL MEDICINE

## 2020-02-09 PROCEDURE — 37799 UNLISTED PX VASCULAR SURGERY: CPT

## 2020-02-09 PROCEDURE — 82330 ASSAY OF CALCIUM: CPT

## 2020-02-09 PROCEDURE — 94003 VENT MGMT INPAT SUBQ DAY: CPT

## 2020-02-09 PROCEDURE — 99900035 HC TECH TIME PER 15 MIN (STAT)

## 2020-02-09 PROCEDURE — 85730 THROMBOPLASTIN TIME PARTIAL: CPT | Mod: 91

## 2020-02-09 PROCEDURE — 83735 ASSAY OF MAGNESIUM: CPT | Mod: 91

## 2020-02-09 PROCEDURE — 27000248 HC VAD-ADDITIONAL DAY

## 2020-02-09 PROCEDURE — 20000000 HC ICU ROOM

## 2020-02-09 PROCEDURE — 63600175 PHARM REV CODE 636 W HCPCS: Performed by: THORACIC SURGERY (CARDIOTHORACIC VASCULAR SURGERY)

## 2020-02-09 PROCEDURE — A4216 STERILE WATER/SALINE, 10 ML: HCPCS | Performed by: THORACIC SURGERY (CARDIOTHORACIC VASCULAR SURGERY)

## 2020-02-09 PROCEDURE — 25000003 PHARM REV CODE 250: Performed by: THORACIC SURGERY (CARDIOTHORACIC VASCULAR SURGERY)

## 2020-02-09 PROCEDURE — 94150 VITAL CAPACITY TEST: CPT

## 2020-02-09 PROCEDURE — 80048 BASIC METABOLIC PNL TOTAL CA: CPT | Mod: 91

## 2020-02-09 PROCEDURE — 99233 SBSQ HOSP IP/OBS HIGH 50: CPT | Mod: ,,, | Performed by: INTERNAL MEDICINE

## 2020-02-09 PROCEDURE — 99291 PR CRITICAL CARE, E/M 30-74 MINUTES: ICD-10-PCS | Mod: GC,,, | Performed by: SURGERY

## 2020-02-09 PROCEDURE — 93750 PR INTERROGATE VENT ASSIST DEV, IN PERSON, W PHYSICIAN ANALYSIS: ICD-10-PCS | Mod: ,,, | Performed by: INTERNAL MEDICINE

## 2020-02-09 PROCEDURE — 25000003 PHARM REV CODE 250: Performed by: STUDENT IN AN ORGANIZED HEALTH CARE EDUCATION/TRAINING PROGRAM

## 2020-02-09 PROCEDURE — 85610 PROTHROMBIN TIME: CPT

## 2020-02-09 PROCEDURE — 99291 CRITICAL CARE FIRST HOUR: CPT | Mod: GC,,, | Performed by: SURGERY

## 2020-02-09 PROCEDURE — 63600175 PHARM REV CODE 636 W HCPCS: Performed by: STUDENT IN AN ORGANIZED HEALTH CARE EDUCATION/TRAINING PROGRAM

## 2020-02-09 PROCEDURE — 25000003 PHARM REV CODE 250

## 2020-02-09 PROCEDURE — 94010 BREATHING CAPACITY TEST: CPT

## 2020-02-09 PROCEDURE — 84295 ASSAY OF SERUM SODIUM: CPT

## 2020-02-09 PROCEDURE — 27000221 HC OXYGEN, UP TO 24 HOURS

## 2020-02-09 PROCEDURE — C9113 INJ PANTOPRAZOLE SODIUM, VIA: HCPCS | Performed by: STUDENT IN AN ORGANIZED HEALTH CARE EDUCATION/TRAINING PROGRAM

## 2020-02-09 PROCEDURE — 84132 ASSAY OF SERUM POTASSIUM: CPT

## 2020-02-09 PROCEDURE — 63600175 PHARM REV CODE 636 W HCPCS

## 2020-02-09 PROCEDURE — 82803 BLOOD GASES ANY COMBINATION: CPT

## 2020-02-09 PROCEDURE — 99233 PR SUBSEQUENT HOSPITAL CARE,LEVL III: ICD-10-PCS | Mod: ,,, | Performed by: INTERNAL MEDICINE

## 2020-02-09 PROCEDURE — 80076 HEPATIC FUNCTION PANEL: CPT

## 2020-02-09 PROCEDURE — 63600367 HC NITRIC OXIDE PER HOUR

## 2020-02-09 PROCEDURE — 85014 HEMATOCRIT: CPT

## 2020-02-09 RX ORDER — POTASSIUM CHLORIDE 20 MEQ/15ML
20 SOLUTION ORAL 2 TIMES DAILY
Status: DISCONTINUED | OUTPATIENT
Start: 2020-02-09 | End: 2020-02-17

## 2020-02-09 RX ORDER — DEXMEDETOMIDINE HYDROCHLORIDE 4 UG/ML
INJECTION, SOLUTION INTRAVENOUS
Status: COMPLETED
Start: 2020-02-09 | End: 2020-02-09

## 2020-02-09 RX ORDER — DEXMEDETOMIDINE HYDROCHLORIDE 4 UG/ML
0.2 INJECTION, SOLUTION INTRAVENOUS CONTINUOUS
Status: DISCONTINUED | OUTPATIENT
Start: 2020-02-09 | End: 2020-02-15

## 2020-02-09 RX ORDER — LANOLIN ALCOHOL/MO/W.PET/CERES
800 CREAM (GRAM) TOPICAL 3 TIMES DAILY
Status: DISCONTINUED | OUTPATIENT
Start: 2020-02-09 | End: 2020-02-24

## 2020-02-09 RX ORDER — WARFARIN 2 MG/1
2 TABLET ORAL ONCE
Status: COMPLETED | OUTPATIENT
Start: 2020-02-09 | End: 2020-02-09

## 2020-02-09 RX ORDER — AMLODIPINE BESYLATE 2.5 MG/1
2.5 TABLET ORAL DAILY
Status: DISCONTINUED | OUTPATIENT
Start: 2020-02-09 | End: 2020-02-10

## 2020-02-09 RX ORDER — HEPARIN SODIUM 10000 [USP'U]/100ML
600 INJECTION, SOLUTION INTRAVENOUS CONTINUOUS
Status: DISCONTINUED | OUTPATIENT
Start: 2020-02-09 | End: 2020-02-10

## 2020-02-09 RX ADMIN — HYDRALAZINE HYDROCHLORIDE 10 MG: 20 INJECTION INTRAMUSCULAR; INTRAVENOUS at 05:02

## 2020-02-09 RX ADMIN — DEXMEDETOMIDINE HYDROCHLORIDE 400 MCG: 100 INJECTION, SOLUTION, CONCENTRATE INTRAVENOUS at 07:02

## 2020-02-09 RX ADMIN — POLYETHYLENE GLYCOL 3350 17 G: 17 POWDER, FOR SOLUTION ORAL at 08:02

## 2020-02-09 RX ADMIN — MUPIROCIN: 20 OINTMENT TOPICAL at 09:02

## 2020-02-09 RX ADMIN — MUPIROCIN: 20 OINTMENT TOPICAL at 08:02

## 2020-02-09 RX ADMIN — POTASSIUM CHLORIDE 20 MEQ: 20 SOLUTION ORAL at 02:02

## 2020-02-09 RX ADMIN — DOBUTAMINE IN DEXTROSE 5 MCG/KG/MIN: 200 INJECTION, SOLUTION INTRAVENOUS at 06:02

## 2020-02-09 RX ADMIN — POTASSIUM CHLORIDE 20 MEQ: 14.9 INJECTION, SOLUTION INTRAVENOUS at 02:02

## 2020-02-09 RX ADMIN — WARFARIN SODIUM 2 MG: 2 TABLET ORAL at 05:02

## 2020-02-09 RX ADMIN — POTASSIUM CHLORIDE 20 MEQ: 20 SOLUTION ORAL at 09:02

## 2020-02-09 RX ADMIN — CHLOROTHIAZIDE SODIUM 500 MG: 500 INJECTION, POWDER, LYOPHILIZED, FOR SOLUTION INTRAVENOUS at 08:02

## 2020-02-09 RX ADMIN — HYDRALAZINE HYDROCHLORIDE 10 MG: 20 INJECTION INTRAMUSCULAR; INTRAVENOUS at 12:02

## 2020-02-09 RX ADMIN — PROPOFOL 35 MCG/KG/MIN: 10 INJECTION, EMULSION INTRAVENOUS at 05:02

## 2020-02-09 RX ADMIN — PANTOPRAZOLE SODIUM 40 MG: 40 INJECTION, POWDER, FOR SOLUTION INTRAVENOUS at 08:02

## 2020-02-09 RX ADMIN — PROPOFOL 40 MCG/KG/MIN: 10 INJECTION, EMULSION INTRAVENOUS at 01:02

## 2020-02-09 RX ADMIN — EPINEPHRINE 0.05 MCG/KG/MIN: 1 INJECTION INTRAMUSCULAR; INTRAVENOUS; SUBCUTANEOUS at 09:02

## 2020-02-09 RX ADMIN — Medication 10 ML: at 06:02

## 2020-02-09 RX ADMIN — HYDRALAZINE HYDROCHLORIDE 10 MG: 20 INJECTION INTRAMUSCULAR; INTRAVENOUS at 09:02

## 2020-02-09 RX ADMIN — Medication 10 ML: at 12:02

## 2020-02-09 RX ADMIN — AMLODIPINE BESYLATE 2.5 MG: 2.5 TABLET ORAL at 01:02

## 2020-02-09 RX ADMIN — ASPIRIN 325 MG ORAL TABLET 325 MG: 325 PILL ORAL at 08:02

## 2020-02-09 RX ADMIN — POTASSIUM CHLORIDE 20 MEQ: 14.9 INJECTION, SOLUTION INTRAVENOUS at 01:02

## 2020-02-09 RX ADMIN — FENTANYL CITRATE 25 MCG: 50 INJECTION INTRAMUSCULAR; INTRAVENOUS at 07:02

## 2020-02-09 RX ADMIN — FUROSEMIDE 20 MG/HR: 10 INJECTION, SOLUTION INTRAMUSCULAR; INTRAVENOUS at 03:02

## 2020-02-09 RX ADMIN — Medication 800 MG: at 02:02

## 2020-02-09 RX ADMIN — DOBUTAMINE IN DEXTROSE 5 MCG/KG/MIN: 200 INJECTION, SOLUTION INTRAVENOUS at 09:02

## 2020-02-09 NOTE — PROGRESS NOTES
[facility]  Critical Care - Surgery  Progress Note    Patient Name: Saba Lott  MRN: [id number]  Admission Date: 1/15/2020  Hospital Length of Stay: 25 days  Code Status: Prior  Attending Provider: David Joshi MD  Primary Care Provider: Primary Doctor No   Principal Problem: Presence of left ventricular assist device (LVAD)    Subjective:     Hospital/ICU Course:  No notes on file    Interval History/Significant Events:     POD2 chest closure.  No issues overnight.  MAPs 74-92  CVP 13-18  CT with R med 60, L med 50 output  On epi 0.06 and  5  Radha weaned to 5  Lasix gtt 20 with 3.4 L out in 24 hours  On CPAP trial this AM    Follow-up For: Procedure(s) (LRB):  CLOSURE, WOUND, STERNUM (N/A)  INSERTION-RIGHT VENTRICULAR ASSIST DEVICE (Right)    Post-Operative Day: 2    Objective:     Vital Signs (Most Recent):  Temp: 98 °F (36.7 °C) (02/09/20 0700)  Pulse: 102 (02/09/20 0906)  Resp: (!) 24 (02/09/20 0906)  BP: (!) 84/0 (02/09/20 0800)  SpO2: 100 % (02/09/20 0600) Vital Signs (24h Range):  Temp:  [97.7 °F (36.5 °C)-98.3 °F (36.8 °C)] 98 °F (36.7 °C)  Pulse:  [] 102  Resp:  [16-26] 24  SpO2:  [99 %-100 %] 100 %  BP: (82-88)/(0) 84/0  Arterial Line BP: ()/(65-82) 80/68     Weight: 86.4 kg (190 lb 7.6 oz)  Body mass index is 29.83 kg/m².      Intake/Output Summary (Last 24 hours) at 2/9/2020 0930  Last data filed at 2/9/2020 0900  Gross per 24 hour   Intake 2116.6 ml   Output 3610 ml   Net -1493.4 ml       Physical Exam   Constitutional: He appears well-developed and well-nourished.   HENT:   Head: Normocephalic and atraumatic.   Intubated   Cardiovascular: Normal rate.   LVAD in place  Right and left mediastinal chest tubes ss output  Introducer in place   Pulmonary/Chest:   Intubated   Abdominal: Soft.   Genitourinary:   Genitourinary Comments: Mc cath in place   Neurological:   Sedated   Skin: Skin is warm and dry.   Nursing note and vitals reviewed.      Vents:  Vent Mode: Spont  (02/09/20 0906)  Ventilator Initiated: Yes (02/06/20 1251)  Set Rate: 14 BPM (02/09/20 0723)  Vt Set: 450 mL (02/09/20 0723)  Pressure Support: 10 cmH20 (02/09/20 0906)  PEEP/CPAP: 5 cmH20 (02/09/20 0906)  Oxygen Concentration (%): 40 (02/09/20 0906)  Peak Airway Pressure: 16 cmH2O (02/09/20 0906)  Plateau Pressure: 22 cmH20 (02/09/20 0906)  Total Ve: 11.4 mL (02/09/20 0906)  Negative Inspiratory Force (cm H2O): -39 (02/09/20 0906)  F/VT Ratio<105 (RSBI): (!) 80.81 (02/09/20 0906)    Lines/Drains/Airways     Peripherally Inserted Central Catheter Line                 PICC Triple Lumen 02/08/20 1530 right basilic less than 1 day          Central Venous Catheter Line                 Percutaneous Central Line Insertion/Assessment - Quad lumen  02/06/20 0742 3 days         Percutaneous Central Line Insertion/Assessment - quad lumen  02/06/20 0742 left internal jugular 3 days         Introducer 02/08/20 0700 right internal jugular 1 day         Percutaneous Central Line Insertion/Assessment - double lumen  02/08/20 0700 right internal jugular 1 day          Drain                 Urethral Catheter 02/06/20 0736 Non-latex;Temperature probe 14 Fr. 3 days         Chest Tube 02/06/20 1250 Left Mediastinal 2 days         Chest Tube 02/06/20 1250 Right Mediastinal 2 days         NG/OG Tube 02/08/20 1215 nasogastric Right nostril less than 1 day          Airway                 Airway - Non-Surgical 02/06/20 0848 3 days          Arterial Line                 Arterial Line 02/06/20 0736 Left Other (Comment) 3 days          Line                 VAD 02/06/20 1047 Left ventricular assist device HeartMate 3 2 days                Significant Labs:    CBC/Anemia Profile:  Recent Labs   Lab 02/08/20  2348 02/09/20  0353 02/09/20  0403 02/09/20  0803   WBC 19.33* 18.52*  --  18.35*   HGB 9.8* 9.7*  --  9.5*   HCT 30.7* 30.9* 30* 30.4*    219  --  211   MCV 82 82  --  82   RDW 18.3* 18.3*  --  18.2*        Chemistries:  Recent Labs    Lab 02/08/20  0407  02/08/20  0832 02/08/20  1203  02/08/20  2348 02/09/20  0353 02/09/20  0803      < >  --  138   < > 138 138 139   K 4.5   < >  --  4.2   < > 4.1 4.3 4.2      < >  --  102   < > 101 102 103   CO2 21*   < >  --  23   < > 24 25 25   BUN 33*   < >  --  36*   < > 40* 41* 43*   CREATININE 1.3   < >  --  1.3   < > 1.3 1.3 1.4   CALCIUM 9.4   < >  --  9.4   < > 9.7 9.6 9.3   ALBUMIN 3.1*  --   --   --   --   --  2.9*  --    PROT 7.6  --   --   --   --   --  7.8  --    BILITOT 1.2*  --   --   --   --   --  1.1*  --    ALKPHOS 95  --   --   --   --   --  99  --    ALT 22  --   --   --   --   --  20  --    AST 85*  --   --   --   --   --  52*  --    MG 2.2  --  2.8* 3.0*  --   --   --  2.5    < > = values in this interval not displayed.       ABGs:   Recent Labs   Lab 02/09/20  0403   PH 7.478*   PCO2 35.7   HCO3 26.4   POCSATURATED 100   BE 3     BMP:   Recent Labs   Lab 02/09/20  0803   *      K 4.2      CO2 25   BUN 43*   CREATININE 1.4   CALCIUM 9.3   MG 2.5     CMP:   Recent Labs   Lab 02/08/20  0407  02/08/20  2348 02/09/20  0353 02/09/20  0803      < > 138 138 139   K 4.5   < > 4.1 4.3 4.2      < > 101 102 103   CO2 21*   < > 24 25 25   *   < > 133* 136* 146*   BUN 33*   < > 40* 41* 43*   CREATININE 1.3   < > 1.3 1.3 1.4   CALCIUM 9.4   < > 9.7 9.6 9.3   PROT 7.6  --   --  7.8  --    ALBUMIN 3.1*  --   --  2.9*  --    BILITOT 1.2*  --   --  1.1*  --    ALKPHOS 95  --   --  99  --    AST 85*  --   --  52*  --    ALT 22  --   --  20  --    ANIONGAP 14   < > 13 11 11   EGFRNONAA >60.0   < > >60.0 >60.0 56.2*    < > = values in this interval not displayed.     Coagulation:   Recent Labs   Lab 02/09/20  0803   INR 1.2   APTT 31.3     Lactic Acid: No results for input(s): LACTATE in the last 48 hours.  All pertinent labs within the past 24 hours have been reviewed.    Significant Imaging:  I have reviewed all pertinent imaging results/findings within the  past 24 hours.     CXR: anterior left PTX still present    Assessment/Plan:     Non-ischemic cardiomyopathy  Plan:    Neuro:   -Pain control: prn fentanyl 25 q1  -Sedation precedex gtt, prop if needed but prefer prec today  -Attempt to keep low sedation for extubation tomorrow AM    Pulmonary:   -intubated, maintain PAV+ at 25% 5/40 today  -Radha to 5, CPAP trial early lubna AM and possible extubation on CPAP 10/5   -Daily SBT    Cardiac:  -MAP goal >65  -Epi 0.05,  5  -LVAD speed 5200, flows 4.2 - 4.6  -LDH decreasing  -CT output monitor every hour  -d/c introducer  -PICC in place  -amlodipine 2.5    Renal:   -Mc in place  -UOP adequate  -Bun/Cr 41/1.3  -lasix gtt at 20  -diuril 500 daily x 2 days (ends today)    Fluids/Electrolytes/Nutrition/GI:   -Nutritional status: NPO  -bar Mg, K  -place NGT, set to LIWS  -bowel regimen: Miralax and docusate  -TF 20 cc/hr peptamen intense  -SLP c/s    Hematology/Oncology:  -H/H stable  -INR/Plts 1.1/219  -Trend CBC  - coumadin 2  - hep 600 goal 45 - 54    Infectious Disease:   -Afebrile  -WBC 18.5  -Abx: vanc and ancef    Endocrine:  -Glucose goal of 120-180  -Insulin gtt per endo    Dispo:  -Continue care in the ICU setting. Goal of low sedation and PAV+ on 25 % 5/40 in order to extubate tomorrow morning on 5 Radha.       Critical care was time spent personally by me on the following activities: development of treatment plan with patient or surrogate and bedside caregivers, discussions with consultants, evaluation of patient's response to treatment, examination of patient, ordering and performing treatments and interventions, ordering and review of laboratory studies, ordering and review of radiographic studies, pulse oximetry, re-evaluation of patient's condition.  This critical care time did not overlap with that of any other provider or involve time for any procedures.     Ted Feldman MD  Critical Care - Surgery  [facility]

## 2020-02-09 NOTE — PLAN OF CARE
Pt remains intubated and lightly sedated using propofol, weaned at end of shift for pending extubation.  Ventilator AC/VC 40/5 with Radha weaned to 5 ppm.  Excellent UOP noted and scant chest tube output.  LVAD speed 5200 with flows 4.2-4.8, no alarms appreciated.  Wakes up, follows commands.  No family to discuss plan of care. Plan for extubation on day shift.

## 2020-02-09 NOTE — OP NOTE
Date of service:  02/06/2020    Preoperative diagnosis:   1.  Acute on chronic systolic and diastolic heart failure, and by class 4, INTERMACS 2  2.  Cardiogenic shock  3.  History of tobacco abuse  4.  History of alcohol abuse  5.  Acute kidney injury  6.  Deep venous thrombosis of the right lower extremities  7.  Morbid obesity  8.  Elevated bilirubin   9.  Significant hepatic congestion secondary to heart failure and RV dysfunction  10. Moderate to severe mitral regurgitation was obtained  11.  Status post intra-aortic balloon pump.    Postop diagnosis:  Same    Operation:  1.  Mitral valve repair-Edge to edge repair-through the LV apex using Ricardo stitch technique  2.  Implantation of left ventricular assist device-HeartMate 3  3.  Temporary closure of the chest    Staff surgeon:  David Joshi  First assistant:  Aldo  Anesthesia:  GTA  Estimated blood loss:  200 cc    Key findings of the operation:  1.  Diffuse coagulopathy  2.  Moderate to severe RV dysfunction   3.  Significant hemoconcentration carried out total of 1600 cc.    4.  Patient significantly fluid overload with a CVP of 15    Indication of operation:  This is a 54-year-old gentleman who presented with acute on chronic systolic and diastolic heart failure with cardiogenic shock.  Patient was worked up and placement of intra-aortic balloon pump along with extensive diuresis with multiple inotropes and nitric oxide was carried out.  Patient was debilitated and had almost 3 weeks of hospitalization.  Patient was worked up and was found to be a candidate for a destination therapy LVAD.  Patient was offered different treatment options and different types of problems available.  Patient after understanding the risks and benefits and treatment options wanted to proceed with implantation of a HeartMate 3.  An informed consent was obtained.    Operation:  Patient was brought to the operating room and placed in supine position.  After induction of  anesthesia area was prepped and draped in the usual sterile fashion.  An upper midline incision was made which was carried all the way down to the sternum.  Amedian sternotomy was then performed.  Sternal edges were cauterized.  Chest retractor was placed.  Pericardium was then opened up.  Pericardial well was created.  Left hemidiaphragm was then taken down.  Systemic heparinization was carried out.  Preperitoneal space was dissected for LVAD pocket.  Cannulation stitches were then placed.  Arterial cannula placed in the ascending aorta.  Right atrial appendage was used for placement of venous cannula.  Patient was placed on full cardiopulmonary bypass after a  was achieved.  Carbon dioxide was used to Flood the operative field continuously to reduce chances of any air embolism.  Heart was brought to the field by placing multiple lap sponges behind the heart.  To LV apex was identified on the examination in multiple views.  LV apex was scored in submitted for pathology.  Multiple 2 Ethibond pledgetted stitches were placed circumferentially.  This helped up in opening of the LV apex.  Malleable retractors were used for visualization of the mitral valve.  Subvalvular apparatus of the mitral valve appeared to be intact.  Posterior leaflet was found to be significantly tethered.  This was resulting in moderate to severe mitral regurgitation.  Analysis of A2 and P2 segment of the mitral valve was carried out.  Both these segments was found to be of good quality.  A decision was made to carry out a patient with repair of the mitral valve.  Using a 5 0 pledgetted Ethibond stitch A2 and P2 segments of the mitral valve were repaired.  Sutures were tied down.  Valve was then tested.  Significant reduction in mitral regurgitation was noted.    Once this was done attention was then directed towards the LV apex.  All the sutures were then passed through the LV apex was now passed through the sewing ring.  Needles were cut  and passed off the field and sutures were tied down.  Once that was done LVAD was brought to the field it was lowered into the sewing ring and secured with locking mechanism.  The hardware lowered back into the chest cavity after removing all the lap sponges and the LVAD was placed in the preperitoneal space.  Using a tunneling device the driveline was pulled out through the exit site and the level of the umbilicus in the right midclavicular line.  Outflow graft was brought to the field and measured for appropriate length.  It was beveled 45 degree angle.  Side-biting clamp was applied on the ascending aorta.  Aortotomy was then carried out.  Using a 5 0 Prolene suture continuous running and last Amoss is were performed of the outflow graft to the ascending aorta.  Side-biting clamp was then removed.  De-airing of the outflow graft was carried out.  Retrograde connection of the outflow graft to the LVAD was carried out.  Aortic root vent was placed in the ascending aorta.  The driveline was then connected to the console and started at 3000 RPMs.  Ventilation was resumed.  Electrolytes was found to be within normal limits.  On inotropics support patient was weaned off cardiopulmonary bypass after ensuring absence of any intracardiac air on MACIEL examination.  With volume loading the speed of the pump was gradually increased from 3000 RPMs all the way to 5100 RPMs.  Patient's RV function was significantly depressed and needed high inotropic support along with the high doses of nitric oxide.  At this stage decision was made to leave the chest open due to severe RV dysfunction.  Test dose of protamine was given followed by full dose of protamine to reverse the effects of systemic heparin.  Two drainage tubes were placed and brought through separate skin incision and connected to Pleur-Evac.  Temporary closure of the chest was carried out with Esmarch and Ioban to obtain an airtight seal.    Stomach count of needles  sponges instrument was found to be correct.  Diverting exit site was sutured with 2 0 Ethibond stitch.  Patient was taken back to the intensive care unit in a stable condition.    Medicare attestation:  Due to unavailability of an adequately trained cardiothoracic surgery resident I performed all parts of the operation myself.

## 2020-02-09 NOTE — SUBJECTIVE & OBJECTIVE
Interval History: Intubated/sedated. Follows commands.     Continuous Infusions:   sodium chloride 0.9% 10 mL/hr at 02/07/20 1632    dexmedetomidine (PRECEDEX) infusion 0.6 mcg/kg/hr (02/09/20 0800)    dextrose 5 % and 0.45 % NaCl with KCl 40 mEq 10 mL/hr at 02/07/20 1632    DOBUTamine 5 mcg/kg/min (02/09/20 0800)    epinephrine 0.06 mcg/kg/min (02/09/20 0800)    furosemide (LASIX) 2 mg/mL infusion (non-titrating) 20 mg/hr (02/09/20 0800)    heparin (porcine) in 5 % dex Stopped (02/08/20 1100)    nicardipine Stopped (02/09/20 0700)    nitric oxide gas      propofol Stopped (02/09/20 0700)     Scheduled Meds:   albumin human 5%  25 g Intravenous Once    aspirin  325 mg Oral Daily    aspirin  325 mg Per NG tube Daily    docusate sodium  100 mg Oral BID    ferrous gluconate  324 mg Oral Daily with breakfast    mupirocin   Nasal BID    pantoprazole  40 mg Intravenous Daily    polyethylene glycol  17 g Oral Daily    sodium chloride 0.9%  10 mL Intravenous Q6H     PRN Meds:albumin human 5%, albuterol sulfate, bisacodyL, Dextrose 10% Bolus, Dextrose 10% Bolus, Dextrose 10% Bolus, Dextrose 10% Bolus, Dextrose 10% Bolus, fentaNYL, glucagon (human recombinant), hydrALAZINE, insulin aspart U-100, magnesium hydroxide 400 mg/5 ml, magnesium sulfate IVPB, oxyCODONE, oxyCODONE, potassium chloride, potassium chloride, sodium chloride 0.9%, Flushing PICC Protocol **AND** sodium chloride 0.9% **AND** sodium chloride 0.9%    Review of patient's allergies indicates:   Allergen Reactions    Iodine and iodide containing products      Objective:     Vital Signs (Most Recent):  Temp: 98 °F (36.7 °C) (02/09/20 0700)  Pulse: 103 (02/09/20 0930)  Resp: 15 (02/09/20 0930)  BP: (!) 84/0 (02/09/20 0800)  SpO2: 100 % (02/09/20 0600) Vital Signs (24h Range):  Temp:  [97.7 °F (36.5 °C)-98.3 °F (36.8 °C)] 98 °F (36.7 °C)  Pulse:  [] 103  Resp:  [15-26] 15  SpO2:  [99 %-100 %] 100 %  BP: (82-88)/(0) 84/0  Arterial Line BP:  ()/(65-82) 85/71     Patient Vitals for the past 72 hrs (Last 3 readings):   Weight   02/07/20 0429 86.4 kg (190 lb 7.6 oz)     Body mass index is 29.83 kg/m².      Intake/Output Summary (Last 24 hours) at 2/9/2020 0952  Last data filed at 2/9/2020 0900  Gross per 24 hour   Intake 2116.6 ml   Output 3610 ml   Net -1493.4 ml     PAP: (22-24)/(13-14) 22/13  PAP (Mean):  [17 mmHg-19 mmHg] 17 mmHg  Physical Exam   Constitutional: He appears well-developed and well-nourished. He is sedated and intubated.   HENT:   Head: Normocephalic and atraumatic.   Eyes: Pupils are equal, round, and reactive to light. EOM are normal.   Neck:   RIJ cordis/PA   LIJ Quad lumen   Cardiovascular: Normal rate and regular rhythm.   VAD hum.   Pulmonary/Chest: Effort normal and breath sounds normal. He is intubated. No respiratory distress. He has no wheezes. He has no rales.   Abdominal: Soft. Bowel sounds are normal. He exhibits distension. There is no tenderness.   Musculoskeletal: Normal range of motion.   Neurological: No cranial nerve deficit.   Skin: Skin is warm and dry. Capillary refill takes less than 2 seconds.     Significant Labs:  CBC:  Recent Labs   Lab 02/08/20  2348 02/09/20  0353 02/09/20  0403 02/09/20  0803   WBC 19.33* 18.52*  --  18.35*   RBC 3.75* 3.79*  --  3.69*   HGB 9.8* 9.7*  --  9.5*   HCT 30.7* 30.9* 30* 30.4*    219  --  211   MCV 82 82  --  82   MCH 26.1* 25.6*  --  25.7*   MCHC 31.9* 31.4*  --  31.3*     BNP:  Recent Labs   Lab 02/02/20  1511 02/05/20  0300 02/06/20  0334   BNP 3,069* 1,865* 1,420*     CMP:  Recent Labs   Lab 02/07/20  0405  02/08/20  0407  02/08/20  2348 02/09/20  0353 02/09/20  0803   *   < > 146*   < > 133* 136* 146*   CALCIUM 10.5   < > 9.4   < > 9.7 9.6 9.3   ALBUMIN 3.5  --  3.1*  --   --  2.9*  --    PROT 7.6  --  7.6  --   --  7.8  --       < > 136   < > 138 138 139   K 4.6   < > 4.5   < > 4.1 4.3 4.2   CO2 25   < > 21*   < > 24 25 25   CL 99   < > 101   < >  101 102 103   BUN 34*   < > 33*   < > 40* 41* 43*   CREATININE 1.3   < > 1.3   < > 1.3 1.3 1.4   ALKPHOS 102  --  95  --   --  99  --    ALT 22  --  22  --   --  20  --    *  --  85*  --   --  52*  --    BILITOT 1.8*  --  1.2*  --   --  1.1*  --     < > = values in this interval not displayed.      Coagulation:   Recent Labs   Lab 02/08/20  2348 02/09/20  0353 02/09/20  0803   INR 1.1 1.1 1.2   APTT 30.6 33.8*  33.8* 31.3     LDH:  Recent Labs   Lab 02/06/20  1250 02/07/20  0800 02/08/20  0407 02/09/20  0353   * 562* 527* 488*     Microbiology:  Microbiology Results (last 7 days)     Procedure Component Value Units Date/Time    Blood culture [[id number]] Collected:  02/03/20 0946    Order Status:  Completed Specimen:  Blood from Peripheral, Hand, Right Updated:  02/08/20 1022     Blood Culture, Routine No growth after 5 days.    Blood culture [[id number]] Collected:  02/03/20 0946    Order Status:  Completed Specimen:  Blood from Peripheral, Hand, Right Updated:  02/08/20 1022     Blood Culture, Routine No growth after 5 days.        I have reviewed all pertinent labs within the past 24 hours.    Estimated Creatinine Clearance: 62.6 mL/min (based on SCr of 1.4 mg/dL).    Diagnostic Results:  I have reviewed and interpreted all pertinent imaging results/findings within the past 24 hours.

## 2020-02-09 NOTE — PLAN OF CARE
Plan of Care Note  Cardiothoracic Surgery    Saba Lott is a 54 y.o. male with heart failure who underwent LVAD placement (2/6/20) and chest closure (2/7/20).    Specific issues: ptt goal 45-54, tube feeds to 25/hr, norvasc 2.5, po electrolyte replacements, epi to 0.05, coumadin 2, d/c neck line    Plan of care for patient was discussed with ICU staff including nurses, residents, and faculty and appropriate consulting services.    Will continue to monitor patient's hemodynamics, functionality, neuro status, fluid status and renal function, and labs and will adjust medications and fluids as necessary while monitoring appropriateness for de-escalation of support and monitoring and transport to stepdown unit.    Benjy Street MD  Cardiothoracic Surgery Fellow

## 2020-02-09 NOTE — PROGRESS NOTES
Dr. Feldman @ bedside. Propofol gtt off. Pt on Cpap, anxious and coughing , RR mid 30s. precedex gtt ordered to aid in extubation.

## 2020-02-09 NOTE — ASSESSMENT & PLAN NOTE
Plan:    Neuro:   -Pain control: prn fentanyl 25 q1  -Sedation precedex gtt, prop if needed but prefer prec today  -Attempt to keep low sedation for extubation tomorrow AM    Pulmonary:   -intubated, maintain PAV+ at 25% 5/40 today  -Radha to 5, CPAP trial early lubna AM and possible extubation on CPAP 10/5   -Daily SBT    Cardiac:  -MAP goal >65  -Epi 0.05,  5  -LVAD speed 5200, flows 4.2 - 4.6  -LDH decreasing  -CT output monitor every hour  -d/c introducer  -PICC in place  -amlodipine 2.5    Renal:   -Mc in place  -UOP adequate  -Bun/Cr 41/1.3  -lasix gtt at 20  -diuril 500 daily x 2 days (ends today)    Fluids/Electrolytes/Nutrition/GI:   -Nutritional status: NPO  -bar Mg, K  -place NGT, set to LIWS  -bowel regimen: Miralax and docusate  -TF 20 cc/hr peptamen intense  -SLP c/s    Hematology/Oncology:  -H/H stable  -INR/Plts 1.1/219  -Trend CBC  - coumadin 2  - hep 600 goal 45 - 54    Infectious Disease:   -Afebrile  -WBC 18.5  -Abx: vanc and ancef    Endocrine:  -Glucose goal of 120-180  -Insulin gtt per endo    Dispo:  -Continue care in the ICU setting. Goal of low sedation and PAV+ on 25 % 5/40 in order to extubate tomorrow morning on 5 Radha.

## 2020-02-09 NOTE — ASSESSMENT & PLAN NOTE
-HeartMate 3 Implanted 02/06/2020 as DT. Chest closed 2/7.   -CTS Primary.  -Implanted by Dr. Joshi.  -INR sub therapeutic Coumadin, Goal INR 2.0-3.0. Anticoag per CTS.   -Antiplatelets  mg.  -LDH is stable overall today. Will continue to monitor daily.  -Continues on Epi, , Cardene, Radha.  -CVP elevated. Lasix 20mg/hr + Diuril. Continue aggressive diuresis.   -Speed set at 5200, LSL 4800 rpm.  -Not listed for OHTx.  Procedure: Device Interrogation Including analysis of device parameters.  Current Settings: Ventricular Assist Device.  Review of device function is stable/unstable stable.    TXP LVAD INTERROGATIONS 2/9/2020 2/9/2020 2/9/2020 2/9/2020 2/9/2020 2/9/2020 2/9/2020   Type HeartMate3 HeartMate3 HeartMate3 - - HeartMate3 -   Flow 4.4 4.4 4.4 4.2 4.2 4.2 4.2   Speed 5200 5200 5200 5200 5200 5200 5200   PI 3.1 3.1 3.1 4.2 4.3 4.8 4.6   Power (Centeno) 3.8 3.8 3.8 3.8 3.8 3.8 3.7   LSL - - 4800 - - 4800 -   Pulsatility - - No Pulse - - No Pulse -

## 2020-02-09 NOTE — PROGRESS NOTES
02/09/2020  Yudith Mcmanus    Current provider:  David Joshi MD      I, Yudith Mcmanus, rounded on Saba Oren to ensure all mechanical assist device settings (IABP or VAD) were appropriate and all parameters were within limits.  I was able to ensure all back up equipment was present, the staff had no issues, and the Perfusion Department daily rounding was complete.    2:25 PM

## 2020-02-09 NOTE — CARE UPDATE
BG goal 140 - 180      BG is now within or below goal with no use of insulin therapy.      - Moderate Dose SQ Insulin Correction Scale.   - BG Monitoring every 4 hours while NPO.      ** Please call Endocrine for any BG related issues **  ** Please notify Endocrine for any change and/or advance in diet**     Discharge Recommendations:    TBD. Please notify endocrinology prior to discharge.

## 2020-02-09 NOTE — PROGRESS NOTES
[facility]  Heart Transplant  Progress Note  Patient Name: Saba Lott  MRN: [id number]  Admission Date: 1/15/2020  Hospital Length of Stay: 25 days  Attending Physician: David Joshi MD  Primary Care Provider: Primary Doctor No  Principal Problem:Presence of left ventricular assist device (LVAD)    Subjective:     Interval History: Intubated/sedated. Follows commands.     Continuous Infusions:   sodium chloride 0.9% 10 mL/hr at 02/07/20 1632    dexmedetomidine (PRECEDEX) infusion 0.6 mcg/kg/hr (02/09/20 0800)    dextrose 5 % and 0.45 % NaCl with KCl 40 mEq 10 mL/hr at 02/07/20 1632    DOBUTamine 5 mcg/kg/min (02/09/20 0800)    epinephrine 0.06 mcg/kg/min (02/09/20 0800)    furosemide (LASIX) 2 mg/mL infusion (non-titrating) 20 mg/hr (02/09/20 0800)    heparin (porcine) in 5 % dex Stopped (02/08/20 1100)    nicardipine Stopped (02/09/20 0700)    nitric oxide gas      propofol Stopped (02/09/20 0700)     Scheduled Meds:   albumin human 5%  25 g Intravenous Once    aspirin  325 mg Oral Daily    aspirin  325 mg Per NG tube Daily    docusate sodium  100 mg Oral BID    ferrous gluconate  324 mg Oral Daily with breakfast    mupirocin   Nasal BID    pantoprazole  40 mg Intravenous Daily    polyethylene glycol  17 g Oral Daily    sodium chloride 0.9%  10 mL Intravenous Q6H     PRN Meds:albumin human 5%, albuterol sulfate, bisacodyL, Dextrose 10% Bolus, Dextrose 10% Bolus, Dextrose 10% Bolus, Dextrose 10% Bolus, Dextrose 10% Bolus, fentaNYL, glucagon (human recombinant), hydrALAZINE, insulin aspart U-100, magnesium hydroxide 400 mg/5 ml, magnesium sulfate IVPB, oxyCODONE, oxyCODONE, potassium chloride, potassium chloride, sodium chloride 0.9%, Flushing PICC Protocol **AND** sodium chloride 0.9% **AND** sodium chloride 0.9%    Review of patient's allergies indicates:   Allergen Reactions    Iodine and iodide containing products      Objective:     Vital Signs (Most Recent):  Temp: 98 °F  (36.7 °C) (02/09/20 0700)  Pulse: 103 (02/09/20 0930)  Resp: 15 (02/09/20 0930)  BP: (!) 84/0 (02/09/20 0800)  SpO2: 100 % (02/09/20 0600) Vital Signs (24h Range):  Temp:  [97.7 °F (36.5 °C)-98.3 °F (36.8 °C)] 98 °F (36.7 °C)  Pulse:  [] 103  Resp:  [15-26] 15  SpO2:  [99 %-100 %] 100 %  BP: (82-88)/(0) 84/0  Arterial Line BP: ()/(65-82) 85/71     Patient Vitals for the past 72 hrs (Last 3 readings):   Weight   02/07/20 0429 86.4 kg (190 lb 7.6 oz)     Body mass index is 29.83 kg/m².      Intake/Output Summary (Last 24 hours) at 2/9/2020 0952  Last data filed at 2/9/2020 0900  Gross per 24 hour   Intake 2116.6 ml   Output 3610 ml   Net -1493.4 ml     PAP: (22-24)/(13-14) 22/13  PAP (Mean):  [17 mmHg-19 mmHg] 17 mmHg  Physical Exam   Constitutional: He appears well-developed and well-nourished. He is sedated and intubated.   HENT:   Head: Normocephalic and atraumatic.   Eyes: Pupils are equal, round, and reactive to light. EOM are normal.   Neck:   RIJ cordis/PA   LIJ Quad lumen   Cardiovascular: Normal rate and regular rhythm.   VAD hum.   Pulmonary/Chest: Effort normal and breath sounds normal. He is intubated. No respiratory distress. He has no wheezes. He has no rales.   Abdominal: Soft. Bowel sounds are normal. He exhibits distension. There is no tenderness.   Musculoskeletal: Normal range of motion.   Neurological: No cranial nerve deficit.   Skin: Skin is warm and dry. Capillary refill takes less than 2 seconds.     Significant Labs:  CBC:  Recent Labs   Lab 02/08/20  2348 02/09/20  0353 02/09/20  0403 02/09/20  0803   WBC 19.33* 18.52*  --  18.35*   RBC 3.75* 3.79*  --  3.69*   HGB 9.8* 9.7*  --  9.5*   HCT 30.7* 30.9* 30* 30.4*    219  --  211   MCV 82 82  --  82   MCH 26.1* 25.6*  --  25.7*   MCHC 31.9* 31.4*  --  31.3*     BNP:  Recent Labs   Lab 02/02/20  1511 02/05/20  0300 02/06/20  0334   BNP 3,069* 1,865* 1,420*     CMP:  Recent Labs   Lab 02/07/20  0405  02/08/20  0407   02/08/20 2348 02/09/20  0353 02/09/20  0803   *   < > 146*   < > 133* 136* 146*   CALCIUM 10.5   < > 9.4   < > 9.7 9.6 9.3   ALBUMIN 3.5  --  3.1*  --   --  2.9*  --    PROT 7.6  --  7.6  --   --  7.8  --       < > 136   < > 138 138 139   K 4.6   < > 4.5   < > 4.1 4.3 4.2   CO2 25   < > 21*   < > 24 25 25   CL 99   < > 101   < > 101 102 103   BUN 34*   < > 33*   < > 40* 41* 43*   CREATININE 1.3   < > 1.3   < > 1.3 1.3 1.4   ALKPHOS 102  --  95  --   --  99  --    ALT 22  --  22  --   --  20  --    *  --  85*  --   --  52*  --    BILITOT 1.8*  --  1.2*  --   --  1.1*  --     < > = values in this interval not displayed.      Coagulation:   Recent Labs   Lab 02/08/20 2348 02/09/20  0353 02/09/20  0803   INR 1.1 1.1 1.2   APTT 30.6 33.8*  33.8* 31.3     LDH:  Recent Labs   Lab 02/06/20  1250 02/07/20  0800 02/08/20  0407 02/09/20  0353   * 562* 527* 488*     Microbiology:  Microbiology Results (last 7 days)     Procedure Component Value Units Date/Time    Blood culture [[id number]] Collected:  02/03/20 0946    Order Status:  Completed Specimen:  Blood from Peripheral, Hand, Right Updated:  02/08/20 1022     Blood Culture, Routine No growth after 5 days.    Blood culture [[id number]] Collected:  02/03/20 0946    Order Status:  Completed Specimen:  Blood from Peripheral, Hand, Right Updated:  02/08/20 1022     Blood Culture, Routine No growth after 5 days.        I have reviewed all pertinent labs within the past 24 hours.    Estimated Creatinine Clearance: 62.6 mL/min (based on SCr of 1.4 mg/dL).    Diagnostic Results:  I have reviewed and interpreted all pertinent imaging results/findings within the past 24 hours.    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 Chronic MARC - Class II-III and mild LE  edema.      Hospital Course (synopsis of major diagnoses, care, treatment, and services provided during the course of the hospital stay):  -2/12 admit to CDU for diuresis with IV lasix  -IV abx   -CXR with suspected small left pleural effusion with associated left basilar atelectasis versus evolving airspace disease  -2/13 single chamber ICD implant; IV lasix decreased to BID  -2/16 add dobutamine, hold BB due to hypotension, no ACE-I or ARB due to recent HPI hypotension  -2/17 Lasix changed to PO  -2/18 dobutamine discontinued    Admitted to [facility] on Thursday due to severe SOB for a week with associated orthopnea, MARC, and PND unable to lie flat and found to be in acute diastolic heart failure. States he normally weighs around 219 but up to 254lb on admission tonight. Says he hasn't been the most compliant in terms of fluid restriction and daily weights but has avoided salt. BNP on admission was 2375. BUN/CRT 32/1.4 He was started on IV diuretics but continued to deteriorate. Creatinine continued to increase and reached 4.3 with oliguria. He was started on CRRT for a few days and tolerated well. Renal u/s was negative for obstruction and liver u/s without findings of cirrhosis. All of this was progression of cardiorenal syndrome with liver congestion from severe volume overload. On arrival vitals stable and in no acute distress. He has obvious anasarca up to the chest. He did have uop of 500 at time of arrival also.    TTE 5/28/19  · Moderate left ventricular enlargement.  · Severely decreased left ventricular systolic function. The estimated ejection fraction is 20%  · Global hypokinetic wall motion.  · Grade III (severe) left ventricular diastolic dysfunction consistent with restrictive physiology.  · Severe left atrial enlargement.  · Moderate right ventricular enlargement.  · Low normal right ventricular systolic function.  · Mild right atrial enlargement.  · Moderate mitral regurgitation.  Mild to  moderate tricuspid regurgitation    * Presence of left ventricular assist device (LVAD)  -HeartMate 3 Implanted 02/06/2020 as DT. Chest closed 2/7.   -CTS Primary.  -Implanted by Dr. Joshi.  -INR sub therapeutic Coumadin, Goal INR 2.0-3.0. Anticoag per CTS.   -Antiplatelets  mg.  -LDH is stable overall today. Will continue to monitor daily.  -Continues on Epi, , Cardene, Radha.  -CVP elevated. Lasix 20mg/hr + Diuril. Continue aggressive diuresis.   -Speed set at 5200, LSL 4800 rpm.  -Not listed for OHTx.  Procedure: Device Interrogation Including analysis of device parameters.  Current Settings: Ventricular Assist Device.  Review of device function is stable/unstable stable.    TXP LVAD INTERROGATIONS 2/9/2020 2/9/2020 2/9/2020 2/9/2020 2/9/2020 2/9/2020 2/9/2020   Type HeartMate3 HeartMate3 HeartMate3 - - HeartMate3 -   Flow 4.4 4.4 4.4 4.2 4.2 4.2 4.2   Speed 5200 5200 5200 5200 5200 5200 5200   PI 3.1 3.1 3.1 4.2 4.3 4.8 4.6   Power (Centeno) 3.8 3.8 3.8 3.8 3.8 3.8 3.7   LSL - - 4800 - - 4800 -   Pulsatility - - No Pulse - - No Pulse -       ANJELICA (acute kidney injury)  -Creatinine was 3.0 on admit and got as high as 4.3 at OSH prior to transfer. Renal function was normal 8 months ago.  -Trending down today.     Acute hyperglycemia  -HgA1C 6.6  -Endocrine following    Moderate malnutrition  - Boost glucose control added 1/27   - Prealbumin is 23    Morbid obesity  -Weight down considerably since admit with diuresis.    Cardiogenic shock  -NICM; Likely Etoh induced  -Transferred from [facility] with IABP at 1:1 for further level of care. IABP removed 1/25 and replaced 2/3.   -S/P HMIII on 2/3.     Deep vein thrombosis (DVT) of right lower extremity  -Unsure of timing but was on eliquis PTA.   -Will anticoag with heparin/warfarin post VAD.     AICD (automatic cardioverter/defibrillator) present  -Medtronic single chamber ICD placed 2019 for primary prevention    Mickey Rider NP  Heart  Transplant  [facility]

## 2020-02-09 NOTE — PROGRESS NOTES
I have reviewed and concur with the resident's history, physical, assessment, and plan.  I have personally interviewed and examined the patient at bedside.  I have also discussed with the patient's wife.  I have rounded with the primary service (the liver team).  I have reviewed the available laboratory values and images.    Please also refer to the note written by the resident today and link it to this note.    Issues in this critically ill patient include:    1.  Congestive heart failure.  Manage with an LVAD.  Adequate PI and flow.  Managed by the primary service.  Anticoagulated for management of LVAD.  Maintains adequate organ perfusion.    2.  New neurological status.  Nonfocal exam awake alert somewhat agitated.    3.  Acute respiratory failure.  Maintaining good oxygenation and ventilation.  I have attempted to wean him off the ventilator but he has become progressively tachypneic and agitated.  I have therefore decided to back off and rest him on proportional assist ventilation.  We will attempt to slowly wean him off the ventilator this way.    4.  Nutritional status.  We are working towards for bite him adequate nutritional support.    5.  Glucose control as per protocol.    6.  White count is 18.3.  No overt signs of infection.    7.  Fluid overload and renal function.  Creatinine is now 1.4.  We are diuresing him actively.  He is tolerating this for now.    8.  GI prophylaxis with pantoprazole.  Anticoagulated.    9.  Lines, tubes, and drains reviewed and considered necessary for his care.    Assessment plan:  Overall making modest but significant progress.  I will try to wean towards extubation again tomorrow.      I have spent 32 min critical time involved in the management of this patient.  This has included a physical exam, review of laboratory values and images and discussions with the multidisciplinary team.

## 2020-02-09 NOTE — SUBJECTIVE & OBJECTIVE
Interval History/Significant Events:     POD2 chest closure.  No issues overnight.  MAPs 74-92  CVP 13-18  CT with R med 60, L med 50 output  On epi 0.06 and  5  Radha weaned to 5  Lasix gtt 20 with 3.4 L out in 24 hours  On CPAP trial this AM    Follow-up For: Procedure(s) (LRB):  CLOSURE, WOUND, STERNUM (N/A)  INSERTION-RIGHT VENTRICULAR ASSIST DEVICE (Right)    Post-Operative Day: 2    Objective:     Vital Signs (Most Recent):  Temp: 98 °F (36.7 °C) (02/09/20 0700)  Pulse: 102 (02/09/20 0906)  Resp: (!) 24 (02/09/20 0906)  BP: (!) 84/0 (02/09/20 0800)  SpO2: 100 % (02/09/20 0600) Vital Signs (24h Range):  Temp:  [97.7 °F (36.5 °C)-98.3 °F (36.8 °C)] 98 °F (36.7 °C)  Pulse:  [] 102  Resp:  [16-26] 24  SpO2:  [99 %-100 %] 100 %  BP: (82-88)/(0) 84/0  Arterial Line BP: ()/(65-82) 80/68     Weight: 86.4 kg (190 lb 7.6 oz)  Body mass index is 29.83 kg/m².      Intake/Output Summary (Last 24 hours) at 2/9/2020 0930  Last data filed at 2/9/2020 0900  Gross per 24 hour   Intake 2116.6 ml   Output 3610 ml   Net -1493.4 ml       Physical Exam   Constitutional: He appears well-developed and well-nourished.   HENT:   Head: Normocephalic and atraumatic.   Intubated   Cardiovascular: Normal rate.   LVAD in place  Right and left mediastinal chest tubes ss output  Introducer in place   Pulmonary/Chest:   Intubated   Abdominal: Soft.   Genitourinary:   Genitourinary Comments: Mc cath in place   Neurological:   Sedated   Skin: Skin is warm and dry.   Nursing note and vitals reviewed.      Vents:  Vent Mode: Spont (02/09/20 0906)  Ventilator Initiated: Yes (02/06/20 1251)  Set Rate: 14 BPM (02/09/20 0723)  Vt Set: 450 mL (02/09/20 0723)  Pressure Support: 10 cmH20 (02/09/20 0906)  PEEP/CPAP: 5 cmH20 (02/09/20 0906)  Oxygen Concentration (%): 40 (02/09/20 0906)  Peak Airway Pressure: 16 cmH2O (02/09/20 0906)  Plateau Pressure: 22 cmH20 (02/09/20 0906)  Total Ve: 11.4 mL (02/09/20 0906)  Negative Inspiratory Force (cm  H2O): -39 (02/09/20 0906)  F/VT Ratio<105 (RSBI): (!) 80.81 (02/09/20 0906)    Lines/Drains/Airways     Peripherally Inserted Central Catheter Line                 PICC Triple Lumen 02/08/20 1530 right basilic less than 1 day          Central Venous Catheter Line                 Percutaneous Central Line Insertion/Assessment - Quad lumen  02/06/20 0742 3 days         Percutaneous Central Line Insertion/Assessment - quad lumen  02/06/20 0742 left internal jugular 3 days         Introducer 02/08/20 0700 right internal jugular 1 day         Percutaneous Central Line Insertion/Assessment - double lumen  02/08/20 0700 right internal jugular 1 day          Drain                 Urethral Catheter 02/06/20 0736 Non-latex;Temperature probe 14 Fr. 3 days         Chest Tube 02/06/20 1250 Left Mediastinal 2 days         Chest Tube 02/06/20 1250 Right Mediastinal 2 days         NG/OG Tube 02/08/20 1215 nasogastric Right nostril less than 1 day          Airway                 Airway - Non-Surgical 02/06/20 0848 3 days          Arterial Line                 Arterial Line 02/06/20 0736 Left Other (Comment) 3 days          Line                 VAD 02/06/20 1047 Left ventricular assist device HeartMate 3 2 days                Significant Labs:    CBC/Anemia Profile:  Recent Labs   Lab 02/08/20  2348 02/09/20  0353 02/09/20  0403 02/09/20  0803   WBC 19.33* 18.52*  --  18.35*   HGB 9.8* 9.7*  --  9.5*   HCT 30.7* 30.9* 30* 30.4*    219  --  211   MCV 82 82  --  82   RDW 18.3* 18.3*  --  18.2*        Chemistries:  Recent Labs   Lab 02/08/20  0407  02/08/20  0832 02/08/20  1203  02/08/20  2348 02/09/20  0353 02/09/20  0803      < >  --  138   < > 138 138 139   K 4.5   < >  --  4.2   < > 4.1 4.3 4.2      < >  --  102   < > 101 102 103   CO2 21*   < >  --  23   < > 24 25 25   BUN 33*   < >  --  36*   < > 40* 41* 43*   CREATININE 1.3   < >  --  1.3   < > 1.3 1.3 1.4   CALCIUM 9.4   < >  --  9.4   < > 9.7 9.6 9.3    ALBUMIN 3.1*  --   --   --   --   --  2.9*  --    PROT 7.6  --   --   --   --   --  7.8  --    BILITOT 1.2*  --   --   --   --   --  1.1*  --    ALKPHOS 95  --   --   --   --   --  99  --    ALT 22  --   --   --   --   --  20  --    AST 85*  --   --   --   --   --  52*  --    MG 2.2  --  2.8* 3.0*  --   --   --  2.5    < > = values in this interval not displayed.       ABGs:   Recent Labs   Lab 02/09/20  0403   PH 7.478*   PCO2 35.7   HCO3 26.4   POCSATURATED 100   BE 3     BMP:   Recent Labs   Lab 02/09/20  0803   *      K 4.2      CO2 25   BUN 43*   CREATININE 1.4   CALCIUM 9.3   MG 2.5     CMP:   Recent Labs   Lab 02/08/20  0407  02/08/20  2348 02/09/20  0353 02/09/20  0803      < > 138 138 139   K 4.5   < > 4.1 4.3 4.2      < > 101 102 103   CO2 21*   < > 24 25 25   *   < > 133* 136* 146*   BUN 33*   < > 40* 41* 43*   CREATININE 1.3   < > 1.3 1.3 1.4   CALCIUM 9.4   < > 9.7 9.6 9.3   PROT 7.6  --   --  7.8  --    ALBUMIN 3.1*  --   --  2.9*  --    BILITOT 1.2*  --   --  1.1*  --    ALKPHOS 95  --   --  99  --    AST 85*  --   --  52*  --    ALT 22  --   --  20  --    ANIONGAP 14   < > 13 11 11   EGFRNONAA >60.0   < > >60.0 >60.0 56.2*    < > = values in this interval not displayed.     Coagulation:   Recent Labs   Lab 02/09/20  0803   INR 1.2   APTT 31.3     Lactic Acid: No results for input(s): LACTATE in the last 48 hours.  All pertinent labs within the past 24 hours have been reviewed.    Significant Imaging:  I have reviewed all pertinent imaging results/findings within the past 24 hours.     CXR: anterior left PTX still present

## 2020-02-10 LAB
ALBUMIN SERPL BCP-MCNC: 2.8 G/DL (ref 3.5–5.2)
ALBUMIN SERPL BCP-MCNC: 2.8 G/DL (ref 3.5–5.2)
ALLENS TEST: ABNORMAL
ALP SERPL-CCNC: 107 U/L (ref 55–135)
ALP SERPL-CCNC: 107 U/L (ref 55–135)
ALT SERPL W/O P-5'-P-CCNC: 23 U/L (ref 10–44)
ALT SERPL W/O P-5'-P-CCNC: 23 U/L (ref 10–44)
ANION GAP SERPL CALC-SCNC: 10 MMOL/L (ref 8–16)
ANION GAP SERPL CALC-SCNC: 11 MMOL/L (ref 8–16)
ANION GAP SERPL CALC-SCNC: 13 MMOL/L (ref 8–16)
ANION GAP SERPL CALC-SCNC: 15 MMOL/L (ref 8–16)
ANION GAP SERPL CALC-SCNC: 15 MMOL/L (ref 8–16)
APTT BLDCRRT: 30.4 SEC (ref 21–32)
APTT BLDCRRT: 30.4 SEC (ref 21–32)
APTT BLDCRRT: 35.9 SEC (ref 21–32)
APTT BLDCRRT: 40.2 SEC (ref 21–32)
APTT BLDCRRT: 42 SEC (ref 21–32)
APTT BLDCRRT: 43.7 SEC (ref 21–32)
AST SERPL-CCNC: 41 U/L (ref 10–40)
AST SERPL-CCNC: 41 U/L (ref 10–40)
BASOPHILS # BLD AUTO: 0.06 K/UL (ref 0–0.2)
BASOPHILS # BLD AUTO: 0.08 K/UL (ref 0–0.2)
BASOPHILS NFR BLD: 0.3 % (ref 0–1.9)
BASOPHILS NFR BLD: 0.4 % (ref 0–1.9)
BILIRUB DIRECT SERPL-MCNC: 1.1 MG/DL (ref 0.1–0.3)
BILIRUB DIRECT SERPL-MCNC: 1.1 MG/DL (ref 0.1–0.3)
BILIRUB SERPL-MCNC: 1.4 MG/DL (ref 0.1–1)
BILIRUB SERPL-MCNC: 1.4 MG/DL (ref 0.1–1)
BNP SERPL-MCNC: 887 PG/ML (ref 0–99)
BUN SERPL-MCNC: 46 MG/DL (ref 6–20)
BUN SERPL-MCNC: 49 MG/DL (ref 6–20)
BUN SERPL-MCNC: 50 MG/DL (ref 6–20)
BUN SERPL-MCNC: 50 MG/DL (ref 6–20)
BUN SERPL-MCNC: 51 MG/DL (ref 6–20)
CALCIUM SERPL-MCNC: 10 MG/DL (ref 8.7–10.5)
CALCIUM SERPL-MCNC: 9.6 MG/DL (ref 8.7–10.5)
CALCIUM SERPL-MCNC: 9.6 MG/DL (ref 8.7–10.5)
CALCIUM SERPL-MCNC: 9.7 MG/DL (ref 8.7–10.5)
CALCIUM SERPL-MCNC: 9.9 MG/DL (ref 8.7–10.5)
CHLORIDE SERPL-SCNC: 103 MMOL/L (ref 95–110)
CHLORIDE SERPL-SCNC: 105 MMOL/L (ref 95–110)
CO2 SERPL-SCNC: 25 MMOL/L (ref 23–29)
CO2 SERPL-SCNC: 25 MMOL/L (ref 23–29)
CO2 SERPL-SCNC: 26 MMOL/L (ref 23–29)
CO2 SERPL-SCNC: 26 MMOL/L (ref 23–29)
CO2 SERPL-SCNC: 27 MMOL/L (ref 23–29)
CO2 SERPL-SCNC: 29 MMOL/L (ref 23–29)
CO2 SERPL-SCNC: 30 MMOL/L (ref 23–29)
CREAT SERPL-MCNC: 1.3 MG/DL (ref 0.5–1.4)
CREAT SERPL-MCNC: 1.4 MG/DL (ref 0.5–1.4)
CREAT SERPL-MCNC: 1.4 MG/DL (ref 0.5–1.4)
CRP SERPL-MCNC: 270.9 MG/L (ref 0–8.2)
DELSYS: ABNORMAL
DIFFERENTIAL METHOD: ABNORMAL
DIFFERENTIAL METHOD: ABNORMAL
EOSINOPHIL # BLD AUTO: 0.5 K/UL (ref 0–0.5)
EOSINOPHIL # BLD AUTO: 0.6 K/UL (ref 0–0.5)
EOSINOPHIL NFR BLD: 2.6 % (ref 0–8)
EOSINOPHIL NFR BLD: 3.5 % (ref 0–8)
ERYTHROCYTE [DISTWIDTH] IN BLOOD BY AUTOMATED COUNT: 18.4 % (ref 11.5–14.5)
ERYTHROCYTE [DISTWIDTH] IN BLOOD BY AUTOMATED COUNT: 18.5 % (ref 11.5–14.5)
EST. GFR  (AFRICAN AMERICAN): >60 ML/MIN/1.73 M^2
EST. GFR  (NON AFRICAN AMERICAN): 56.2 ML/MIN/1.73 M^2
EST. GFR  (NON AFRICAN AMERICAN): 56.2 ML/MIN/1.73 M^2
EST. GFR  (NON AFRICAN AMERICAN): >60 ML/MIN/1.73 M^2
FIO2: 36
FIO2: 40
FIO2: 40
FLOW: 4
FLOW: 5
GLUCOSE SERPL-MCNC: 123 MG/DL (ref 70–110)
GLUCOSE SERPL-MCNC: 130 MG/DL (ref 70–110)
GLUCOSE SERPL-MCNC: 133 MG/DL (ref 70–110)
GLUCOSE SERPL-MCNC: 133 MG/DL (ref 70–110)
GLUCOSE SERPL-MCNC: 134 MG/DL (ref 70–110)
GLUCOSE SERPL-MCNC: 138 MG/DL (ref 70–110)
GLUCOSE SERPL-MCNC: 144 MG/DL (ref 70–110)
HCO3 UR-SCNC: 27.5 MMOL/L (ref 24–28)
HCO3 UR-SCNC: 29 MMOL/L (ref 24–28)
HCO3 UR-SCNC: 31.5 MMOL/L (ref 24–28)
HCO3 UR-SCNC: 34.1 MMOL/L (ref 24–28)
HCT VFR BLD AUTO: 30 % (ref 40–54)
HCT VFR BLD AUTO: 30.9 % (ref 40–54)
HGB BLD-MCNC: 9.3 G/DL (ref 14–18)
HGB BLD-MCNC: 9.5 G/DL (ref 14–18)
IMM GRANULOCYTES # BLD AUTO: 0.35 K/UL (ref 0–0.04)
IMM GRANULOCYTES # BLD AUTO: 0.38 K/UL (ref 0–0.04)
IMM GRANULOCYTES NFR BLD AUTO: 1.8 % (ref 0–0.5)
IMM GRANULOCYTES NFR BLD AUTO: 2.1 % (ref 0–0.5)
INR PPP: 1.1 (ref 0.8–1.2)
LDH SERPL L TO P-CCNC: 0.65 MMOL/L (ref 0.36–1.25)
LDH SERPL L TO P-CCNC: 0.77 MMOL/L (ref 0.36–1.25)
LDH SERPL L TO P-CCNC: 0.84 MMOL/L (ref 0.36–1.25)
LDH SERPL L TO P-CCNC: 0.87 MMOL/L (ref 0.36–1.25)
LDH SERPL L TO P-CCNC: 446 U/L (ref 110–260)
LYMPHOCYTES # BLD AUTO: 1 K/UL (ref 1–4.8)
LYMPHOCYTES # BLD AUTO: 1 K/UL (ref 1–4.8)
LYMPHOCYTES NFR BLD: 5.1 % (ref 18–48)
LYMPHOCYTES NFR BLD: 5.6 % (ref 18–48)
MAGNESIUM SERPL-MCNC: 2.5 MG/DL (ref 1.6–2.6)
MAGNESIUM SERPL-MCNC: 2.5 MG/DL (ref 1.6–2.6)
MAGNESIUM SERPL-MCNC: 2.6 MG/DL (ref 1.6–2.6)
MAGNESIUM SERPL-MCNC: 2.6 MG/DL (ref 1.6–2.6)
MAGNESIUM SERPL-MCNC: 2.7 MG/DL (ref 1.6–2.6)
MAGNESIUM SERPL-MCNC: 2.8 MG/DL (ref 1.6–2.6)
MAGNESIUM SERPL-MCNC: 2.8 MG/DL (ref 1.6–2.6)
MAP: 7
MCH RBC QN AUTO: 25.7 PG (ref 27–31)
MCH RBC QN AUTO: 26 PG (ref 27–31)
MCHC RBC AUTO-ENTMCNC: 30.7 G/DL (ref 32–36)
MCHC RBC AUTO-ENTMCNC: 31 G/DL (ref 32–36)
MCV RBC AUTO: 83 FL (ref 82–98)
MCV RBC AUTO: 84 FL (ref 82–98)
METHEMOGLOBIN: 0.8 % (ref 0–3)
MIN VOL: 7.2
MODE: ABNORMAL
MONOCYTES # BLD AUTO: 1.4 K/UL (ref 0.3–1)
MONOCYTES # BLD AUTO: 1.6 K/UL (ref 0.3–1)
MONOCYTES NFR BLD: 7.9 % (ref 4–15)
MONOCYTES NFR BLD: 8.3 % (ref 4–15)
NEUTROPHILS # BLD AUTO: 14.3 K/UL (ref 1.8–7.7)
NEUTROPHILS # BLD AUTO: 16.1 K/UL (ref 1.8–7.7)
NEUTROPHILS NFR BLD: 80.6 % (ref 38–73)
NEUTROPHILS NFR BLD: 81.8 % (ref 38–73)
NRBC BLD-RTO: 0 /100 WBC
NRBC BLD-RTO: 0 /100 WBC
PCO2 BLDA: 38.9 MMHG (ref 35–45)
PCO2 BLDA: 40.5 MMHG (ref 35–45)
PCO2 BLDA: 44.2 MMHG (ref 35–45)
PCO2 BLDA: 47.9 MMHG (ref 35–45)
PEEP: 5
PEEP: 5
PH SMN: 7.43 [PH] (ref 7.35–7.45)
PH SMN: 7.44 [PH] (ref 7.35–7.45)
PH SMN: 7.48 [PH] (ref 7.35–7.45)
PH SMN: 7.5 [PH] (ref 7.35–7.45)
PHOSPHATE SERPL-MCNC: 3.9 MG/DL (ref 2.7–4.5)
PLATELET # BLD AUTO: 236 K/UL (ref 150–350)
PLATELET # BLD AUTO: 254 K/UL (ref 150–350)
PMV BLD AUTO: 10 FL (ref 9.2–12.9)
PMV BLD AUTO: 9.2 FL (ref 9.2–12.9)
PO2 BLDA: 162 MMHG (ref 80–100)
PO2 BLDA: 167 MMHG (ref 80–100)
PO2 BLDA: 169 MMHG (ref 80–100)
PO2 BLDA: 194 MMHG (ref 80–100)
POC BE: 11 MMOL/L
POC BE: 3 MMOL/L
POC BE: 6 MMOL/L
POC BE: 7 MMOL/L
POC SATURATED O2: 100 % (ref 95–100)
POC SATURATED O2: 99 % (ref 95–100)
POC TCO2: 29 MMOL/L (ref 23–27)
POC TCO2: 30 MMOL/L (ref 23–27)
POC TCO2: 33 MMOL/L (ref 23–27)
POC TCO2: 35 MMOL/L (ref 23–27)
POCT GLUCOSE: 130 MG/DL (ref 70–110)
POCT GLUCOSE: 130 MG/DL (ref 70–110)
POCT GLUCOSE: 135 MG/DL (ref 70–110)
POCT GLUCOSE: 137 MG/DL (ref 70–110)
POCT GLUCOSE: 142 MG/DL (ref 70–110)
POTASSIUM SERPL-SCNC: 3.8 MMOL/L (ref 3.5–5.1)
POTASSIUM SERPL-SCNC: 3.9 MMOL/L (ref 3.5–5.1)
POTASSIUM SERPL-SCNC: 4 MMOL/L (ref 3.5–5.1)
POTASSIUM SERPL-SCNC: 4 MMOL/L (ref 3.5–5.1)
POTASSIUM SERPL-SCNC: 4.3 MMOL/L (ref 3.5–5.1)
PROT SERPL-MCNC: 7.9 G/DL (ref 6–8.4)
PROT SERPL-MCNC: 7.9 G/DL (ref 6–8.4)
PROTHROMBIN TIME: 11.4 SEC (ref 9–12.5)
RBC # BLD AUTO: 3.62 M/UL (ref 4.6–6.2)
RBC # BLD AUTO: 3.66 M/UL (ref 4.6–6.2)
SAMPLE: ABNORMAL
SITE: ABNORMAL
SODIUM SERPL-SCNC: 140 MMOL/L (ref 136–145)
SODIUM SERPL-SCNC: 142 MMOL/L (ref 136–145)
SODIUM SERPL-SCNC: 143 MMOL/L (ref 136–145)
SP02: 100
SPONT RATE: 22
SPONT RATE: 22
VOL: 363
WBC # BLD AUTO: 17.8 K/UL (ref 3.9–12.7)
WBC # BLD AUTO: 19.67 K/UL (ref 3.9–12.7)

## 2020-02-10 PROCEDURE — 84100 ASSAY OF PHOSPHORUS: CPT

## 2020-02-10 PROCEDURE — 63600175 PHARM REV CODE 636 W HCPCS: Performed by: STUDENT IN AN ORGANIZED HEALTH CARE EDUCATION/TRAINING PROGRAM

## 2020-02-10 PROCEDURE — 83735 ASSAY OF MAGNESIUM: CPT | Mod: 91

## 2020-02-10 PROCEDURE — 97116 GAIT TRAINING THERAPY: CPT

## 2020-02-10 PROCEDURE — 83880 ASSAY OF NATRIURETIC PEPTIDE: CPT

## 2020-02-10 PROCEDURE — 20000000 HC ICU ROOM

## 2020-02-10 PROCEDURE — 97168 OT RE-EVAL EST PLAN CARE: CPT

## 2020-02-10 PROCEDURE — A4216 STERILE WATER/SALINE, 10 ML: HCPCS | Performed by: THORACIC SURGERY (CARDIOTHORACIC VASCULAR SURGERY)

## 2020-02-10 PROCEDURE — 25000003 PHARM REV CODE 250: Performed by: STUDENT IN AN ORGANIZED HEALTH CARE EDUCATION/TRAINING PROGRAM

## 2020-02-10 PROCEDURE — 94003 VENT MGMT INPAT SUBQ DAY: CPT

## 2020-02-10 PROCEDURE — 83735 ASSAY OF MAGNESIUM: CPT

## 2020-02-10 PROCEDURE — 27000221 HC OXYGEN, UP TO 24 HOURS

## 2020-02-10 PROCEDURE — 27200188 HC TRANSDUCER, ART ADULT/PEDS

## 2020-02-10 PROCEDURE — 80076 HEPATIC FUNCTION PANEL: CPT

## 2020-02-10 PROCEDURE — 99900026 HC AIRWAY MAINTENANCE (STAT)

## 2020-02-10 PROCEDURE — C9113 INJ PANTOPRAZOLE SODIUM, VIA: HCPCS | Performed by: STUDENT IN AN ORGANIZED HEALTH CARE EDUCATION/TRAINING PROGRAM

## 2020-02-10 PROCEDURE — 99233 PR SUBSEQUENT HOSPITAL CARE,LEVL III: ICD-10-PCS | Mod: GC,,, | Performed by: SURGERY

## 2020-02-10 PROCEDURE — 93750 INTERROGATION VAD IN PERSON: CPT | Mod: ,,, | Performed by: INTERNAL MEDICINE

## 2020-02-10 PROCEDURE — 97164 PT RE-EVAL EST PLAN CARE: CPT

## 2020-02-10 PROCEDURE — 27200966 HC CLOSED SUCTION SYSTEM

## 2020-02-10 PROCEDURE — 27000248 HC VAD-ADDITIONAL DAY

## 2020-02-10 PROCEDURE — 85610 PROTHROMBIN TIME: CPT

## 2020-02-10 PROCEDURE — 99900035 HC TECH TIME PER 15 MIN (STAT)

## 2020-02-10 PROCEDURE — 97530 THERAPEUTIC ACTIVITIES: CPT

## 2020-02-10 PROCEDURE — 83615 LACTATE (LD) (LDH) ENZYME: CPT

## 2020-02-10 PROCEDURE — 83605 ASSAY OF LACTIC ACID: CPT

## 2020-02-10 PROCEDURE — 94761 N-INVAS EAR/PLS OXIMETRY MLT: CPT

## 2020-02-10 PROCEDURE — 80048 BASIC METABOLIC PNL TOTAL CA: CPT | Mod: 91

## 2020-02-10 PROCEDURE — 25000003 PHARM REV CODE 250: Performed by: THORACIC SURGERY (CARDIOTHORACIC VASCULAR SURGERY)

## 2020-02-10 PROCEDURE — 82803 BLOOD GASES ANY COMBINATION: CPT

## 2020-02-10 PROCEDURE — 99233 SBSQ HOSP IP/OBS HIGH 50: CPT | Mod: GC,,, | Performed by: SURGERY

## 2020-02-10 PROCEDURE — 63600367 HC NITRIC OXIDE PER HOUR

## 2020-02-10 PROCEDURE — 85025 COMPLETE CBC W/AUTO DIFF WBC: CPT

## 2020-02-10 PROCEDURE — 63600175 PHARM REV CODE 636 W HCPCS: Performed by: THORACIC SURGERY (CARDIOTHORACIC VASCULAR SURGERY)

## 2020-02-10 PROCEDURE — 85730 THROMBOPLASTIN TIME PARTIAL: CPT

## 2020-02-10 PROCEDURE — 63600175 PHARM REV CODE 636 W HCPCS: Performed by: SURGERY

## 2020-02-10 PROCEDURE — 93750 PR INTERROGATE VENT ASSIST DEV, IN PERSON, W PHYSICIAN ANALYSIS: ICD-10-PCS | Mod: ,,, | Performed by: INTERNAL MEDICINE

## 2020-02-10 PROCEDURE — 37799 UNLISTED PX VASCULAR SURGERY: CPT

## 2020-02-10 PROCEDURE — 85730 THROMBOPLASTIN TIME PARTIAL: CPT | Mod: 91

## 2020-02-10 PROCEDURE — 86140 C-REACTIVE PROTEIN: CPT

## 2020-02-10 RX ORDER — AMLODIPINE BESYLATE 5 MG/1
5 TABLET ORAL DAILY
Status: DISCONTINUED | OUTPATIENT
Start: 2020-02-11 | End: 2020-02-11

## 2020-02-10 RX ORDER — GLUCAGON 1 MG
1 KIT INJECTION
Status: DISCONTINUED | OUTPATIENT
Start: 2020-02-10 | End: 2020-02-12

## 2020-02-10 RX ORDER — POTASSIUM CHLORIDE 20 MEQ/15ML
40 SOLUTION ORAL
Status: DISCONTINUED | OUTPATIENT
Start: 2020-02-10 | End: 2020-02-17

## 2020-02-10 RX ORDER — NICARDIPINE HYDROCHLORIDE 0.2 MG/ML
1 INJECTION INTRAVENOUS CONTINUOUS
Status: DISCONTINUED | OUTPATIENT
Start: 2020-02-10 | End: 2020-02-17

## 2020-02-10 RX ORDER — WARFARIN 2 MG/1
2 TABLET ORAL ONCE
Status: COMPLETED | OUTPATIENT
Start: 2020-02-10 | End: 2020-02-10

## 2020-02-10 RX ORDER — POTASSIUM CHLORIDE 29.8 MG/ML
20 INJECTION INTRAVENOUS ONCE
Status: DISCONTINUED | OUTPATIENT
Start: 2020-02-10 | End: 2020-02-10

## 2020-02-10 RX ORDER — INSULIN ASPART 100 [IU]/ML
1-10 INJECTION, SOLUTION INTRAVENOUS; SUBCUTANEOUS EVERY 4 HOURS PRN
Status: DISCONTINUED | OUTPATIENT
Start: 2020-02-10 | End: 2020-02-12

## 2020-02-10 RX ORDER — HEPARIN SODIUM 10000 [USP'U]/100ML
1100 INJECTION, SOLUTION INTRAVENOUS CONTINUOUS
Status: DISCONTINUED | OUTPATIENT
Start: 2020-02-10 | End: 2020-02-11

## 2020-02-10 RX ORDER — AMLODIPINE BESYLATE 5 MG/1
5 TABLET ORAL ONCE
Status: COMPLETED | OUTPATIENT
Start: 2020-02-10 | End: 2020-02-10

## 2020-02-10 RX ORDER — POTASSIUM CHLORIDE 20 MEQ/15ML
60 SOLUTION ORAL
Status: DISCONTINUED | OUTPATIENT
Start: 2020-02-10 | End: 2020-02-17

## 2020-02-10 RX ORDER — HEPARIN SODIUM 10000 [USP'U]/100ML
900 INJECTION, SOLUTION INTRAVENOUS CONTINUOUS
Status: DISCONTINUED | OUTPATIENT
Start: 2020-02-10 | End: 2020-02-10

## 2020-02-10 RX ORDER — POTASSIUM CHLORIDE 14.9 MG/ML
20 INJECTION INTRAVENOUS ONCE
Status: COMPLETED | OUTPATIENT
Start: 2020-02-10 | End: 2020-02-10

## 2020-02-10 RX ORDER — SYRING-NEEDL,DISP,INSUL,0.3 ML 29 G X1/2"
148 SYRINGE, EMPTY DISPOSABLE MISCELLANEOUS ONCE
Status: COMPLETED | OUTPATIENT
Start: 2020-02-10 | End: 2020-02-10

## 2020-02-10 RX ORDER — HEPARIN SODIUM 10000 [USP'U]/100ML
800 INJECTION, SOLUTION INTRAVENOUS CONTINUOUS
Status: DISCONTINUED | OUTPATIENT
Start: 2020-02-10 | End: 2020-02-10

## 2020-02-10 RX ADMIN — NICARDIPINE HYDROCHLORIDE 6 MG/HR: 0.2 INJECTION, SOLUTION INTRAVENOUS at 09:02

## 2020-02-10 RX ADMIN — Medication 10 ML: at 06:02

## 2020-02-10 RX ADMIN — FUROSEMIDE 20 MG/HR: 10 INJECTION, SOLUTION INTRAMUSCULAR; INTRAVENOUS at 01:02

## 2020-02-10 RX ADMIN — FUROSEMIDE 20 MG/HR: 10 INJECTION, SOLUTION INTRAMUSCULAR; INTRAVENOUS at 12:02

## 2020-02-10 RX ADMIN — NICARDIPINE HYDROCHLORIDE 1 MG/HR: 0.2 INJECTION, SOLUTION INTRAVENOUS at 02:02

## 2020-02-10 RX ADMIN — CHLOROTHIAZIDE SODIUM 500 MG: 500 INJECTION, POWDER, LYOPHILIZED, FOR SOLUTION INTRAVENOUS at 11:02

## 2020-02-10 RX ADMIN — DOBUTAMINE IN DEXTROSE 5 MCG/KG/MIN: 200 INJECTION, SOLUTION INTRAVENOUS at 07:02

## 2020-02-10 RX ADMIN — CHLOROTHIAZIDE SODIUM 250 MG: 500 INJECTION, POWDER, LYOPHILIZED, FOR SOLUTION INTRAVENOUS at 11:02

## 2020-02-10 RX ADMIN — Medication 800 MG: at 09:02

## 2020-02-10 RX ADMIN — POTASSIUM CHLORIDE 40 MEQ: 20 SOLUTION ORAL at 09:02

## 2020-02-10 RX ADMIN — POTASSIUM CHLORIDE 20 MEQ: 20 SOLUTION ORAL at 09:02

## 2020-02-10 RX ADMIN — POTASSIUM CHLORIDE 20 MEQ: 14.9 INJECTION, SOLUTION INTRAVENOUS at 11:02

## 2020-02-10 RX ADMIN — EPINEPHRINE 0.06 MCG/KG/MIN: 1 INJECTION INTRAMUSCULAR; INTRAVENOUS; SUBCUTANEOUS at 03:02

## 2020-02-10 RX ADMIN — OXYCODONE HYDROCHLORIDE 10 MG: 10 TABLET ORAL at 03:02

## 2020-02-10 RX ADMIN — POTASSIUM CHLORIDE 40 MEQ: 20 SOLUTION ORAL at 05:02

## 2020-02-10 RX ADMIN — OXYCODONE HYDROCHLORIDE 10 MG: 10 TABLET ORAL at 09:02

## 2020-02-10 RX ADMIN — PANTOPRAZOLE SODIUM 40 MG: 40 INJECTION, POWDER, FOR SOLUTION INTRAVENOUS at 09:02

## 2020-02-10 RX ADMIN — OXYCODONE HYDROCHLORIDE 10 MG: 10 TABLET ORAL at 07:02

## 2020-02-10 RX ADMIN — MUPIROCIN: 20 OINTMENT TOPICAL at 08:02

## 2020-02-10 RX ADMIN — AMLODIPINE BESYLATE 5 MG: 5 TABLET ORAL at 03:02

## 2020-02-10 RX ADMIN — FUROSEMIDE 20 MG/HR: 10 INJECTION, SOLUTION INTRAMUSCULAR; INTRAVENOUS at 11:02

## 2020-02-10 RX ADMIN — DOBUTAMINE IN DEXTROSE 5 MCG/KG/MIN: 200 INJECTION, SOLUTION INTRAVENOUS at 06:02

## 2020-02-10 RX ADMIN — POLYETHYLENE GLYCOL 3350 17 G: 17 POWDER, FOR SOLUTION ORAL at 09:02

## 2020-02-10 RX ADMIN — FENTANYL CITRATE 25 MCG: 50 INJECTION INTRAMUSCULAR; INTRAVENOUS at 05:02

## 2020-02-10 RX ADMIN — HEPARIN SODIUM 900 UNITS/HR: 10000 INJECTION, SOLUTION INTRAVENOUS at 09:02

## 2020-02-10 RX ADMIN — OXYCODONE HYDROCHLORIDE 10 MG: 10 TABLET ORAL at 11:02

## 2020-02-10 RX ADMIN — ASPIRIN 325 MG ORAL TABLET 325 MG: 325 PILL ORAL at 09:02

## 2020-02-10 RX ADMIN — DEXMEDETOMIDINE HYDROCHLORIDE 0.4 MCG/KG/HR: 100 INJECTION, SOLUTION, CONCENTRATE INTRAVENOUS at 02:02

## 2020-02-10 RX ADMIN — AMLODIPINE BESYLATE 2.5 MG: 2.5 TABLET ORAL at 09:02

## 2020-02-10 RX ADMIN — HYDRALAZINE HYDROCHLORIDE 10 MG: 20 INJECTION INTRAMUSCULAR; INTRAVENOUS at 08:02

## 2020-02-10 RX ADMIN — MUPIROCIN: 20 OINTMENT TOPICAL at 09:02

## 2020-02-10 RX ADMIN — WARFARIN SODIUM 2 MG: 2 TABLET ORAL at 05:02

## 2020-02-10 RX ADMIN — HYDRALAZINE HYDROCHLORIDE 10 MG: 20 INJECTION INTRAMUSCULAR; INTRAVENOUS at 05:02

## 2020-02-10 RX ADMIN — MAGNESIUM CITRATE 148 ML: 1.75 LIQUID ORAL at 08:02

## 2020-02-10 NOTE — SUBJECTIVE & OBJECTIVE
Interval History/Significant Events: POD 4 LVAD placement and POD 3 chest closure. No acute events overnight. Patient extubated this AM to 5L NC. Epi weaned to 0.05,  5, Radha 5, Lasix 20    Follow-up For: Procedure(s) (LRB):  CLOSURE, WOUND, STERNUM (N/A)  INSERTION-RIGHT VENTRICULAR ASSIST DEVICE (Right)    Post-Operative Day: 2    Objective:     Vital Signs (Most Recent):  Temp: 98.1 °F (36.7 °C) (02/10/20 0715)  Pulse: 109 (02/10/20 0830)  Resp: (!) 22 (02/10/20 0830)  BP: (!) 96/0 (02/10/20 0330)  SpO2: 99 % (02/10/20 0500) Vital Signs (24h Range):  Temp:  [97.8 °F (36.6 °C)-98.5 °F (36.9 °C)] 98.1 °F (36.7 °C)  Pulse:  [] 109  Resp:  [15-30] 22  SpO2:  [99 %-100 %] 99 %  BP: (82-96)/(0) 96/0  Arterial Line BP: ()/(65-81) 99/77     Weight: 86.4 kg (190 lb 7.6 oz)  Body mass index is 29.83 kg/m².      Intake/Output Summary (Last 24 hours) at 2/10/2020 0845  Last data filed at 2/10/2020 0800  Gross per 24 hour   Intake 1564 ml   Output 4005 ml   Net -2441 ml       Physical Exam   Constitutional: He is oriented to person, place, and time. He appears well-developed and well-nourished.   HENT:   Head: Normocephalic and atraumatic.   Cardiovascular: Normal rate and regular rhythm.   LVAD in place  Right and left mediastinal chest tubes ss output  Introducer in place   Pulmonary/Chest: Effort normal. No respiratory distress.   NC   Abdominal: Soft.   Genitourinary:   Genitourinary Comments: Mc cath in place   Neurological: He is alert and oriented to person, place, and time.   Skin: Skin is warm and dry.   Psychiatric: He has a normal mood and affect. His behavior is normal.   Nursing note and vitals reviewed.      Vents: N/A    Lines/Drains/Airways     Peripherally Inserted Central Catheter Line                 PICC Triple Lumen 02/08/20 1530 right basilic 1 day          Central Venous Catheter Line                 Percutaneous Central Line Insertion/Assessment - Quad lumen  02/06/20 0742 4 days          Percutaneous Central Line Insertion/Assessment - quad lumen  02/06/20 0742 left internal jugular 4 days         Percutaneous Central Line Insertion/Assessment - double lumen  02/08/20 0700 right internal jugular 2 days          Drain                 Urethral Catheter 02/06/20 0736 Non-latex;Temperature probe 14 Fr. 4 days         Chest Tube 02/06/20 1250 Left Mediastinal 3 days         Chest Tube 02/06/20 1250 Right Mediastinal 3 days         NG/OG Tube 02/08/20 1215 nasogastric Right nostril 1 day          Airway                 Airway - Non-Surgical 02/06/20 0848 3 days          Arterial Line                 Arterial Line 02/06/20 0736 Left Other (Comment) 4 days          Line                 VAD 02/06/20 1047 Left ventricular assist device HeartMate 3 3 days                Significant Labs:    CBC/Anemia Profile:  Recent Labs   Lab 02/09/20  0353  02/09/20  0803 02/09/20  0912 02/10/20  0401   WBC 18.52*  --  18.35*  --  17.80*   HGB 9.7*  --  9.5*  --  9.3*   HCT 30.9*   < > 30.4* 30* 30.0*     --  211  --  236   MCV 82  --  82  --  83   RDW 18.3*  --  18.2*  --  18.4*    < > = values in this interval not displayed.        Chemistries:  Recent Labs   Lab 02/09/20  0353  02/09/20 2002 02/09/20  2343 02/10/20  0401      < > 141 142 143  143   K 4.3   < > 3.8 3.8 3.8  3.8      < > 102 103 103  103   CO2 25   < > 26 26 25  25   BUN 41*   < > 46* 46* 49*  49*   CREATININE 1.3   < > 1.4 1.4 1.3  1.3   CALCIUM 9.6   < > 9.6 9.9 9.6  9.6   ALBUMIN 2.9*  --   --   --  2.8*  2.8*   PROT 7.8  --   --   --  7.9  7.9   BILITOT 1.1*  --   --   --  1.4*  1.4*   ALKPHOS 99  --   --   --  107  107   ALT 20  --   --   --  23  23   AST 52*  --   --   --  41*  41*   MG  --    < > 2.5 2.6 2.5  2.5   PHOS  --   --   --   --  3.9    < > = values in this interval not displayed.       ABGs:   Recent Labs   Lab 02/10/20  0816   PH 7.496*   PCO2 44.2   HCO3 34.1*   POCSATURATED 100   BE 11     BMP:    Recent Labs   Lab 02/10/20  0401   *  133*     143   K 3.8  3.8     103   CO2 25  25   BUN 49*  49*   CREATININE 1.3  1.3   CALCIUM 9.6  9.6   MG 2.5  2.5     CMP:   Recent Labs   Lab 02/09/20  0353  02/09/20 2002 02/09/20  2343 02/10/20  0401      < > 141 142 143  143   K 4.3   < > 3.8 3.8 3.8  3.8      < > 102 103 103  103   CO2 25   < > 26 26 25  25   *   < > 129* 130* 133*  133*   BUN 41*   < > 46* 46* 49*  49*   CREATININE 1.3   < > 1.4 1.4 1.3  1.3   CALCIUM 9.6   < > 9.6 9.9 9.6  9.6   PROT 7.8  --   --   --  7.9  7.9   ALBUMIN 2.9*  --   --   --  2.8*  2.8*   BILITOT 1.1*  --   --   --  1.4*  1.4*   ALKPHOS 99  --   --   --  107  107   AST 52*  --   --   --  41*  41*   ALT 20  --   --   --  23  23   ANIONGAP 11   < > 13 13 15  15   EGFRNONAA >60.0   < > 56.2* 56.2* >60.0  >60.0    < > = values in this interval not displayed.     Coagulation:   Recent Labs   Lab 02/10/20  0401 02/10/20  0628   INR 1.1  --    APTT 30.4  30.4 42.0*     Lactic Acid: No results for input(s): LACTATE in the last 48 hours.  All pertinent labs within the past 24 hours have been reviewed.    Significant Imaging:  I have reviewed all pertinent imaging results/findings within the past 24 hours.     CXR: apical pneumo not well defined today

## 2020-02-10 NOTE — NURSING
Dr. Joshi and CTS notified of drop in UOP to 35cc/hr from 100. cvp 17 flat. New orders given to administer diuril 500mg, [id number] epi to 0.06mcg/kg/min and increase NO to 20ppm.

## 2020-02-10 NOTE — NURSING
Extubation parameters obtained per RT. MD at bedside. Pt extubated to 4LNC with NO 10ppm. All VSS at this time. Will continue to monitor

## 2020-02-10 NOTE — ASSESSMENT & PLAN NOTE
Plan: Extubated, speech consult, restart TF at 20    Neuro:   -Pain control: prn fentanyl 25 q1 and PO oxy  -No sedation    Pulmonary:   -extubated to 5L NC  -Radha to 5  -lasix gtt at 20    Cardiac:  -MAP goal >65  -Epi 0.04,  5  -LVAD speed 5200, flows ~4.4  -LDH trending down  -CT output monitor every hour  -PICC in place  -amlodipine 2.5    Renal:   -Mc in place  -UOP adequate  -Bun/Cr 49/1.3  -lasix gtt at 20    Fluids/Electrolytes/Nutrition/GI:   -Nutritional status: NPO  -bar Mg, K  -NGT  -TF 20 cc/hr peptamen intense  -SLP c/s  -bowel regimen: Miralax and docusate    Hematology/Oncology:  -H/H stable  -INR/Plts 1.1/236  -Trend CBC  -coumadin 2  -hep 900 goal 45 - 54    Infectious Disease:   -Afebrile  -WBC 17.80  -Abx: completed course of vanc and ancef    Endocrine:  -Glucose goal of 120-180  -Insulin gtt per endo    Dispo:  -Continue care in the ICU setting. Extubated this AM. Epi down to 0.04. Speech consult.

## 2020-02-10 NOTE — PLAN OF CARE
Problem: Occupational Therapy Goal  Goal: Occupational Therapy Goal  Description  Goals to be met by: 2/24/20     Patient will increase functional independence with ADLs by performing:    Feeding with Pauline.  UE Dressing with Supervision.  LE Dressing with Supervision.  Grooming while standing at sink with Supervision.  Toileting from toilet with Supervision for hygiene and clothing management.   Toilet transfer to toilet with Supervision.  Pt will be (I) with VAD management during ADL.    Outcome: Ongoing, Progressing   OT re-eval completed, and above goals established. KARLY Artis  2/10/2020

## 2020-02-10 NOTE — PLAN OF CARE
Pt remains lightly sedated on precedex at 0.4 mcg/kg/hr.  Ventilator on PAV+/40%/5, Radha 5 ppm.  Pt lethargic through most of shift but near end, able to lift head off of bed and follow all commands and perform initial weaning parameters.  Epinephrine remains at 0.05mcg/kg/min, dobutamine at 5 mcg/kg/min, and lasix gtt at 20 mg/hr.  Heparin gtt increased from 600 to 800 units/hr per aPTT to achieve goal of 45-54.  Next aPTT due at 0700.  Excellent UOP and chest tube output as noted.  LVAD speed remains 5200 with flows 4.2-4.6 and no alarms appreciated. No family at bedside to discuss plan of care.

## 2020-02-10 NOTE — PROGRESS NOTES
MEDS PROVIDED BY [facility]
NEGATIVE PPD or Quantiferon Gold: Annual 1/18 [facility]  Critical Care - Surgery  Progress Note    Patient Name: Saba Lott  MRN: [id number]  Admission Date: 1/15/2020  Hospital Length of Stay: 26 days  Code Status: Prior  Attending Provider: David Joshi MD  Primary Care Provider: Primary Doctor No   Principal Problem: Presence of left ventricular assist device (LVAD)    Subjective:     Hospital/ICU Course:  No notes on file    Interval History/Significant Events: POD 4 LVAD placement and POD 3 chest closure. No acute events overnight. Patient extubated this AM to 5L NC. Epi weaned to 0.05,  5, Radha 5, Lasix 20    Follow-up For: Procedure(s) (LRB):  CLOSURE, WOUND, STERNUM (N/A)  INSERTION-RIGHT VENTRICULAR ASSIST DEVICE (Right)    Post-Operative Day: 2    Objective:     Vital Signs (Most Recent):  Temp: 98.1 °F (36.7 °C) (02/10/20 0715)  Pulse: 109 (02/10/20 0830)  Resp: (!) 22 (02/10/20 0830)  BP: (!) 96/0 (02/10/20 0330)  SpO2: 99 % (02/10/20 0500) Vital Signs (24h Range):  Temp:  [97.8 °F (36.6 °C)-98.5 °F (36.9 °C)] 98.1 °F (36.7 °C)  Pulse:  [] 109  Resp:  [15-30] 22  SpO2:  [99 %-100 %] 99 %  BP: (82-96)/(0) 96/0  Arterial Line BP: ()/(65-81) 99/77     Weight: 86.4 kg (190 lb 7.6 oz)  Body mass index is 29.83 kg/m².      Intake/Output Summary (Last 24 hours) at 2/10/2020 0845  Last data filed at 2/10/2020 0800  Gross per 24 hour   Intake 1564 ml   Output 4005 ml   Net -2441 ml       Physical Exam   Constitutional: He is oriented to person, place, and time. He appears well-developed and well-nourished.   HENT:   Head: Normocephalic and atraumatic.   Cardiovascular: Normal rate and regular rhythm.   LVAD in place  Right and left mediastinal chest tubes ss output  Introducer in place   Pulmonary/Chest: Effort normal. No respiratory distress.   NC   Abdominal: Soft.   Genitourinary:   Genitourinary Comments: Mc cath in place   Neurological: He is alert and oriented to person, place, and time.   Skin: Skin is warm and  dry.   Psychiatric: He has a normal mood and affect. His behavior is normal.   Nursing note and vitals reviewed.      Vents: N/A    Lines/Drains/Airways     Peripherally Inserted Central Catheter Line                 PICC Triple Lumen 02/08/20 1530 right basilic 1 day          Central Venous Catheter Line                 Percutaneous Central Line Insertion/Assessment - Quad lumen  02/06/20 0742 4 days         Percutaneous Central Line Insertion/Assessment - quad lumen  02/06/20 0742 left internal jugular 4 days         Percutaneous Central Line Insertion/Assessment - double lumen  02/08/20 0700 right internal jugular 2 days          Drain                 Urethral Catheter 02/06/20 0736 Non-latex;Temperature probe 14 Fr. 4 days         Chest Tube 02/06/20 1250 Left Mediastinal 3 days         Chest Tube 02/06/20 1250 Right Mediastinal 3 days         NG/OG Tube 02/08/20 1215 nasogastric Right nostril 1 day          Airway                 Airway - Non-Surgical 02/06/20 0848 3 days          Arterial Line                 Arterial Line 02/06/20 0736 Left Other (Comment) 4 days          Line                 VAD 02/06/20 1047 Left ventricular assist device HeartMate 3 3 days                Significant Labs:    CBC/Anemia Profile:  Recent Labs   Lab 02/09/20  0353  02/09/20  0803 02/09/20  0912 02/10/20  0401   WBC 18.52*  --  18.35*  --  17.80*   HGB 9.7*  --  9.5*  --  9.3*   HCT 30.9*   < > 30.4* 30* 30.0*     --  211  --  236   MCV 82  --  82  --  83   RDW 18.3*  --  18.2*  --  18.4*    < > = values in this interval not displayed.        Chemistries:  Recent Labs   Lab 02/09/20  0353  02/09/20 2002 02/09/20  2343 02/10/20  0401      < > 141 142 143  143   K 4.3   < > 3.8 3.8 3.8  3.8      < > 102 103 103  103   CO2 25   < > 26 26 25  25   BUN 41*   < > 46* 46* 49*  49*   CREATININE 1.3   < > 1.4 1.4 1.3  1.3   CALCIUM 9.6   < > 9.6 9.9 9.6  9.6   ALBUMIN 2.9*  --   --   --  2.8*  2.8*   PROT  7.8  --   --   --  7.9  7.9   BILITOT 1.1*  --   --   --  1.4*  1.4*   ALKPHOS 99  --   --   --  107  107   ALT 20  --   --   --  23  23   AST 52*  --   --   --  41*  41*   MG  --    < > 2.5 2.6 2.5  2.5   PHOS  --   --   --   --  3.9    < > = values in this interval not displayed.       ABGs:   Recent Labs   Lab 02/10/20  0816   PH 7.496*   PCO2 44.2   HCO3 34.1*   POCSATURATED 100   BE 11     BMP:   Recent Labs   Lab 02/10/20  0401   *  133*     143   K 3.8  3.8     103   CO2 25  25   BUN 49*  49*   CREATININE 1.3  1.3   CALCIUM 9.6  9.6   MG 2.5  2.5     CMP:   Recent Labs   Lab 02/09/20  0353  02/09/20 2002 02/09/20  2343 02/10/20  0401      < > 141 142 143  143   K 4.3   < > 3.8 3.8 3.8  3.8      < > 102 103 103  103   CO2 25   < > 26 26 25  25   *   < > 129* 130* 133*  133*   BUN 41*   < > 46* 46* 49*  49*   CREATININE 1.3   < > 1.4 1.4 1.3  1.3   CALCIUM 9.6   < > 9.6 9.9 9.6  9.6   PROT 7.8  --   --   --  7.9  7.9   ALBUMIN 2.9*  --   --   --  2.8*  2.8*   BILITOT 1.1*  --   --   --  1.4*  1.4*   ALKPHOS 99  --   --   --  107  107   AST 52*  --   --   --  41*  41*   ALT 20  --   --   --  23  23   ANIONGAP 11   < > 13 13 15  15   EGFRNONAA >60.0   < > 56.2* 56.2* >60.0  >60.0    < > = values in this interval not displayed.     Coagulation:   Recent Labs   Lab 02/10/20  0401 02/10/20  0628   INR 1.1  --    APTT 30.4  30.4 42.0*     Lactic Acid: No results for input(s): LACTATE in the last 48 hours.  All pertinent labs within the past 24 hours have been reviewed.    Significant Imaging:  I have reviewed all pertinent imaging results/findings within the past 24 hours.     CXR: apical pneumo not well defined today    Assessment/Plan:     Non-ischemic cardiomyopathy  Plan: Extubated, speech consult, restart TF at 20    Neuro:   -Pain control: prn fentanyl 25 q1 and PO oxy  -No sedation    Pulmonary:   -extubated to 5L NC  -Radha to 5  -lasix gtt  at 20    Cardiac:  -MAP goal >65  -Epi 0.04,  5  -LVAD speed 5200, flows ~4.4  -LDH trending down  -CT output monitor every hour  -PICC in place  -amlodipine 2.5    Renal:   -Barton in place  -UOP adequate  -Bun/Cr 49/1.3  -lasix gtt at 20    Fluids/Electrolytes/Nutrition/GI:   -Nutritional status: NPO  -bar Mg, K  -NGT  -TF 20 cc/hr peptamen intense  -SLP c/s  -bowel regimen: Miralax and docusate    Hematology/Oncology:  -H/H stable  -INR/Plts 1.1/236  -Trend CBC  -coumadin 2  -hep 900 goal 45 - 54    Infectious Disease:   -Afebrile  -WBC 17.80  -Abx: completed course of vanc and ancef    Endocrine:  -Glucose goal of 120-180  -Insulin gtt per endo    Dispo:  -Continue care in the ICU setting. Extubated this AM. Epi down to 0.04. Speech consult.         Critical Care Daily Checklist:    A: Awake: RASS Goal/Actual Goal: RASS Goal: 0-->alert and calm  Actual: Mcmillan Agitation Sedation Scale (RASS): Drowsy   B: Spontaneous Breathing Trial Performed? Spon. Breathing Trial Initiated?: Initiated (02/10/20 0910)   C: SAT & SBT Coordinated?  N/a                D: Delirium: CAM-ICU Overall CAM-ICU: Negative   E: Early Mobility Performed? No   F: Feeding Goal: Goals: Patient to receive nutrition by RD follow-up  Status: Nutrition Goal Status: new   Current Diet Order   Procedures    Diet NPO      AS: Analgesia/Sedation PRN oxy/fentanyl  No sedation   T: Thromboembolic Prophylaxis Heparin gtt   H: HOB > 300 Yes   U: Stress Ulcer Prophylaxis (if needed) protonix   G: Glucose Control SSI   B: Bowel Function Stool Occurrence: 0   I: Indwelling Catheter (Lines & Barton) Necessity PICC, brenda, barton, chest tubes x2, NGT, CVC   D: De-escalation of Antimicrobials/Pharmacotherapies n/a    Plan for the day/ETD Extubated, speech consult. Cont to monitor.     Code Status:  Family/Goals of Care: Prior       Critical secondary to Patient has a condition that poses threat to life and bodily function: s/p LVAD placement     Critical care  was time spent personally by me on the following activities: development of treatment plan with patient or surrogate and bedside caregivers, discussions with consultants, evaluation of patient's response to treatment, examination of patient, ordering and performing treatments and interventions, ordering and review of laboratory studies, ordering and review of radiographic studies, pulse oximetry, re-evaluation of patient's condition.  This critical care time did not overlap with that of any other provider or involve time for any procedures.     Peggy Sanders MD  Critical Care - Surgery  [facility]

## 2020-02-10 NOTE — ASSESSMENT & PLAN NOTE
-HeartMate 3 Implanted 02/06/2020 as DT. Chest closed 2/7.   -CTS Primary.  -Implanted by Dr. Joshi.  -INR sub therapeutic Coumadin, Goal INR 2.0-3.0. Anticoag per CTS.   -Antiplatelets  mg.  -LDH is stable overall today. Will continue to monitor daily.  -Continues on Epi, , Cardene, Radha.  -CVP elevated. Continue aggressive diuresis.   -Speed set at 5200, LSL 4800 rpm.  -Not listed for OHTx.  Procedure: Device Interrogation Including analysis of device parameters.  Current Settings: Ventricular Assist Device.  Review of device function is stable/unstable stable.    TXP LVAD INTERROGATIONS 2/10/2020 2/10/2020 2/10/2020 2/10/2020 2/10/2020 2/10/2020 2/10/2020   Type HeartMate3 HeartMate3 HeartMate3 HeartMate3 - - -   Flow 4.3 4.3 4.4 4.2 4.3 4.3 4.4   Speed 5200 5200 5200 5200 5200 5200 5250   PI 3.9 3.7 3.3 4.5 3.7 3.8 3.5   Power (Centeno) 3.8 3.8 3.8 3.8 3.8 3.8 3.8   LSL - - - 4800 - - -   Pulsatility - - - Pulse - - -
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
## 2020-02-10 NOTE — PROGRESS NOTES
02/10/2020  Abelardo Sepulveda    Current provider:  David Joshi MD      I, Abelardo Sepulveda, rounded on Saba Lott to ensure all mechanical assist device settings (IABP or VAD) were appropriate and all parameters were within limits.  I was able to ensure all back up equipment was present, the staff had no issues, and the Perfusion Department daily rounding was complete.    11:54 AM

## 2020-02-10 NOTE — ASSESSMENT & PLAN NOTE
-NICM; Likely Etoh induced  -Transferred from [facility] with IABP at 1:1 for further level of care. IABP removed 1/25 and replaced 2/3.   -S/P HMIII on 2/3.

## 2020-02-10 NOTE — OP NOTE
Date of service:  02/07/2020    Preop diagnosis:    1. Status post left ventricular assist device placement  2.  Severe RV dysfunction  3.  Diffuse coagulopathy.    Postop diagnosis:  Same    Staff surgeon:  David Joshi  First assistant:  Lisbeth Wilkinson   Anesthesia:  GTA  Estimated blood loss:  50 cc    Key findings of the operation:  1.  Significant improvement in diffuse coagulopathy  2.  Improvement in RV dysfunction, now moderately depressed from severe depressed RV state.    Indication of operation:  This is a 54-year-old gentleman who underwent left ventricular assist device placement.  Postoperatively the chest was left open due to diffuse coagulopathy in RV dysfunction.  Overnight patient did well and the decision was made to take the patient back for possible closure.  Risks benefits were discussed and informed consent obtained.    Operation:  Patient was brought to the operating room placed in a supine position after induction of anesthesia area was prepped and draped in the usual sterile fashion.  Previously placed temporary dressing was taken down.  Chest retractor was placed.  Copious amount of irrigation was used to irrigate the chest cavity.  All the chest tubes were irrigated reposition.  Alert cannulation sites were checked for good hemostasis.  Creation of pneo-pericardium was done with the help of Blessing-Tristin membrane to help in reentry at the time of transplant about exchange.  Sternum was then approximated 6.  Stainless steel wire.  Skin and subcutaneous tissues were closed in multiple layers.  Patient was taken back to the intensive care unit in stable condition.    Medicare registration:  Due to unavailability of an adequately trained cardiothoracic surgery resident performed all parts of the operation myself.

## 2020-02-10 NOTE — NURSING
"      SICU PLAN OF CARE NOTE    Dx: Presence of left ventricular assist device (LVAD)    Shift Events: extubated, NC 4L, NO 20ppm    Goals of Care: wean NO, wean cardene    Neuro: AAO x4 and Follows Commands    Vital Signs: BP (!) 92/0 (BP Location: Left arm, Patient Position: Lying)   Pulse (!) 116   Temp 98.2 °F (36.8 °C) (Oral)   Resp (!) 42   Ht 5' 7" (1.702 m)   Wt 119 kg (262 lb 5.6 oz)   SpO2 100%   BMI 41.09 kg/m²     Respiratory: Nasal Cannula w/ Radha    Diet: NPO and Tube Feeds    Gtts: {SICU GTTS:00212}    Urine Output: {ICU Output:47604} *** cc/{ICU Timeframe:98761}    Drains: {ICU Drains (Optional):52404}    {SICU Frequent (Optional):00048}    {SICU Frequent (Optional):18927}    {SICU Frequent (Optional):77109}     Labs/Accuchecks: ***.    Skin: ***       "

## 2020-02-10 NOTE — NURSING
Pt sitting up in bed, NGT in place. Pt extubated this morning, tolerated well. Pt states he sat up on the EOB today and took a couple of steps with PT. Informed pt that we will begin VAD education tomorrow. Mom, caregiver, to present to bedside tomorrow.

## 2020-02-10 NOTE — SUBJECTIVE & OBJECTIVE
Interval History: remains intubated but off all sedation for extubation today     Continuous Infusions:   sodium chloride 0.9% 10 mL/hr at 02/07/20 1632    dexmedetomidine (PRECEDEX) infusion Stopped (02/10/20 0800)    dextrose 5 % and 0.45 % NaCl with KCl 40 mEq 10 mL/hr at 02/07/20 1632    DOBUTamine 5 mcg/kg/min (02/10/20 1000)    epinephrine 0.04 mcg/kg/min (02/10/20 1000)    furosemide (LASIX) 2 mg/mL infusion (non-titrating) 20 mg/hr (02/10/20 1000)    heparin (porcine) in 5 % dex 900 Units/hr (02/10/20 1000)    nitric oxide gas       Scheduled Meds:   amLODIPine  2.5 mg Oral Daily    aspirin  325 mg Per NG tube Daily    chlorothiazide (DIURIL) IVPB  500 mg Intravenous Once    docusate sodium  100 mg Oral BID    ferrous gluconate  324 mg Oral Daily with breakfast    magnesium oxide  800 mg Oral TID    mupirocin   Nasal BID    pantoprazole  40 mg Intravenous Daily    polyethylene glycol  17 g Oral Daily    potassium chloride 10%  20 mEq Per NG tube BID    sodium chloride 0.9%  10 mL Intravenous Q6H    warfarin  2 mg Per NG tube Once     PRN Meds:albumin human 5%, albuterol sulfate, bisacodyL, Dextrose 10% Bolus, Dextrose 10% Bolus, Dextrose 10% Bolus, Dextrose 10% Bolus, Dextrose 10% Bolus, fentaNYL, glucagon (human recombinant), hydrALAZINE, insulin aspart U-100, magnesium hydroxide 400 mg/5 ml, magnesium sulfate IVPB, oxyCODONE, oxyCODONE, potassium chloride 10%, potassium chloride 10%, potassium chloride 10%, potassium chloride 10%, sodium chloride 0.9%, Flushing PICC Protocol **AND** sodium chloride 0.9% **AND** sodium chloride 0.9%    Review of patient's allergies indicates:   Allergen Reactions    Iodine and iodide containing products      Objective:     Vital Signs (Most Recent):  Temp: 98.1 °F (36.7 °C) (02/10/20 0715)  Pulse: (!) 111 (02/10/20 1015)  Resp: (!) 21 (02/10/20 1015)  BP: (!) 90/0 (02/10/20 0900)  SpO2: 99 % (02/10/20 0500) Vital Signs (24h Range):  Temp:  [97.8 °F  (36.6 °C)-98.5 °F (36.9 °C)] 98.1 °F (36.7 °C)  Pulse:  [] 111  Resp:  [15-40] 21  SpO2:  [99 %-100 %] 99 %  BP: ()/(0-72) 90/0  Arterial Line BP: ()/(63-81) 97/75     No data found.  Body mass index is 29.83 kg/m².      Intake/Output Summary (Last 24 hours) at 2/10/2020 1112  Last data filed at 2/10/2020 1000  Gross per 24 hour   Intake 1984 ml   Output 3485 ml   Net -1501 ml       Hemodynamic Parameters:           Physical Exam   Constitutional: He is oriented to person, place, and time. He appears well-developed and well-nourished.   HENT:   Head: Normocephalic and atraumatic.   Cardiovascular: Normal rate and regular rhythm.   LVAD in place  Right and left mediastinal chest tubes ss output  Introducer in place   Pulmonary/Chest: Effort normal. No respiratory distress.   NC   Abdominal: Soft.   Genitourinary:   Genitourinary Comments: Mc cath in place   Neurological: He is alert and oriented to person, place, and time.   Skin: Skin is warm and dry.   Psychiatric: He has a normal mood and affect. His behavior is normal.   Nursing note and vitals reviewed.      Significant Labs:  CBC:  Recent Labs   Lab 02/09/20  0353  02/09/20  0803 02/09/20  0912 02/10/20  0401   WBC 18.52*  --  18.35*  --  17.80*   RBC 3.79*  --  3.69*  --  3.62*   HGB 9.7*  --  9.5*  --  9.3*   HCT 30.9*   < > 30.4* 30* 30.0*     --  211  --  236   MCV 82  --  82  --  83   MCH 25.6*  --  25.7*  --  25.7*   MCHC 31.4*  --  31.3*  --  31.0*    < > = values in this interval not displayed.     BNP:  Recent Labs   Lab 02/05/20  0300 02/06/20  0334 02/10/20  0401   BNP 1,865* 1,420* 887*     CMP:  Recent Labs   Lab 02/08/20  0407  02/09/20  0353  02/09/20  2343 02/10/20  0401 02/10/20  0809   *   < > 136*   < > 130* 133*  133* 123*   CALCIUM 9.4   < > 9.6   < > 9.9 9.6  9.6 9.7   ALBUMIN 3.1*  --  2.9*  --   --  2.8*  2.8*  --    PROT 7.6  --  7.8  --   --  7.9  7.9  --       < > 138   < > 142 143  143  143   K 4.5   < > 4.3   < > 3.8 3.8  3.8 4.3   CO2 21*   < > 25   < > 26 25  25 27      < > 102   < > 103 103  103 105   BUN 33*   < > 41*   < > 46* 49*  49* 50*   CREATININE 1.3   < > 1.3   < > 1.4 1.3  1.3 1.3   ALKPHOS 95  --  99  --   --  107  107  --    ALT 22  --  20  --   --  23  23  --    AST 85*  --  52*  --   --  41*  41*  --    BILITOT 1.2*  --  1.1*  --   --  1.4*  1.4*  --     < > = values in this interval not displayed.      Coagulation:   Recent Labs   Lab 02/09/20  0353 02/09/20  0803  02/09/20  2343 02/10/20  0401 02/10/20  0628   INR 1.1 1.2  --   --  1.1  --    APTT 33.8*  33.8* 31.3   < > 35.9* 30.4  30.4 42.0*    < > = values in this interval not displayed.     LDH:  Recent Labs   Lab 02/08/20  0407 02/09/20  0353 02/10/20  0401   * 488* 446*     Microbiology:  Microbiology Results (last 7 days)     Procedure Component Value Units Date/Time    Blood culture [[id number]] Collected:  02/03/20 0946    Order Status:  Completed Specimen:  Blood from Peripheral, Hand, Right Updated:  02/08/20 1022     Blood Culture, Routine No growth after 5 days.    Blood culture [[id number]] Collected:  02/03/20 0946    Order Status:  Completed Specimen:  Blood from Peripheral, Hand, Right Updated:  02/08/20 1022     Blood Culture, Routine No growth after 5 days.          I have reviewed all pertinent labs within the past 24 hours.    Estimated Creatinine Clearance: 67.4 mL/min (based on SCr of 1.3 mg/dL).    Diagnostic Results:  I have reviewed and interpreted all pertinent imaging results/findings within the past 24 hours.

## 2020-02-10 NOTE — PHYSICIAN QUERY
PT Name: Saba Lott  MR #: [id number]  Physician Query Form - Respiratory Condition Clarification     CDS/: Tisha Ferguson RN, CCDS             Contact information:[email or web address]  This form is a permanent document in the medical record.     Query Date: February 10, 2020    By submitting this query, we are merely seeking further clarification of documentation. Please utilize your independent clinical judgment when addressing the question(s) below.    The medical record contains the following:  Indicators Supporting Clinical Findings Location in Medical Record   X Surgery or Procedure performed:  Implantation of left ventricular assist device-HeartMate 3 2/6 op note    Anesthesia type:     X Respiratory Failure documented Acute respiratory failure. 2/8, 2/9 cc notes    Mechanical Ventilation:     X Difficulty weaning or Prolonged Weaning documented  I have attempted to wean him off the ventilator but he has become progressively tachypneic and agitated    Pt extubated to 4LNC with NO 10ppm. 2/9 cc note          2/10 nurse note    Reintubation documented     X BiPAP, CPAP, or Oxygen administration post-extubation extubated to 5L NC  -Radha to 5 2/10 prog note    Treatments:      Other:       Respiratory failure post-op, due to, or complicating a procedure, is classified as one of the most severe, life-threatening surgical complications a patient can have.                         If the pulmonary dysfunction is expected (inherent to the procedure), it is considered usual mechanical ventilation and not post-op respiratory failure.      If there is acute pulmonary dysfunction requiring non-routine aggressive measures and it is NOT inherent to the procedure, thus unexpected or unusual; it is considered post op respiratory failure.        The clinical guidelines noted below are only system guidelines, and do not replace the providers clinical judgment.    Please clarify if the   Acute Respiratory  Failure            is:   [   ] Inherent to the procedure: Expected or usual for that procedure                                         [   ] Due to a condition other than surgery Unexpected or unusual for that procedure BUT a pre-existing medical condition is present and suspected to be contributing.                    If so, specify condition: ______________           Common examples:  COPD, CHF, a neuromuscular disorder or degenerative neurological disorder   [   ] Complication of surgery or procedure  Unexpected or unusual for that procedure    [   ] Complication of anesthesia   [   ]X Other explanation with details: __No respiratory failure. This is post operative expected and not sure why keeps on getting labelled as respiratory failure____________________   [   ]  Clinically Undetermined                                           Please document in your progress notes daily for the duration of treatment, until resolved, and include in your discharge summary.
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
## 2020-02-10 NOTE — PROGRESS NOTES
[facility]  Heart Transplant  Progress Note    Patient Name: Saba Lott  MRN: [id number]  Admission Date: 1/15/2020  Hospital Length of Stay: 26 days  Attending Physician: David Joshi MD  Primary Care Provider: Primary Doctor No  Principal Problem:Presence of left ventricular assist device (LVAD)    Subjective:     Interval History: remains intubated but off all sedation for extubation today     Continuous Infusions:   sodium chloride 0.9% 10 mL/hr at 02/07/20 1632    dexmedetomidine (PRECEDEX) infusion Stopped (02/10/20 0800)    dextrose 5 % and 0.45 % NaCl with KCl 40 mEq 10 mL/hr at 02/07/20 1632    DOBUTamine 5 mcg/kg/min (02/10/20 1000)    epinephrine 0.04 mcg/kg/min (02/10/20 1000)    furosemide (LASIX) 2 mg/mL infusion (non-titrating) 20 mg/hr (02/10/20 1000)    heparin (porcine) in 5 % dex 900 Units/hr (02/10/20 1000)    nitric oxide gas       Scheduled Meds:   amLODIPine  2.5 mg Oral Daily    aspirin  325 mg Per NG tube Daily    chlorothiazide (DIURIL) IVPB  500 mg Intravenous Once    docusate sodium  100 mg Oral BID    ferrous gluconate  324 mg Oral Daily with breakfast    magnesium oxide  800 mg Oral TID    mupirocin   Nasal BID    pantoprazole  40 mg Intravenous Daily    polyethylene glycol  17 g Oral Daily    potassium chloride 10%  20 mEq Per NG tube BID    sodium chloride 0.9%  10 mL Intravenous Q6H    warfarin  2 mg Per NG tube Once     PRN Meds:albumin human 5%, albuterol sulfate, bisacodyL, Dextrose 10% Bolus, Dextrose 10% Bolus, Dextrose 10% Bolus, Dextrose 10% Bolus, Dextrose 10% Bolus, fentaNYL, glucagon (human recombinant), hydrALAZINE, insulin aspart U-100, magnesium hydroxide 400 mg/5 ml, magnesium sulfate IVPB, oxyCODONE, oxyCODONE, potassium chloride 10%, potassium chloride 10%, potassium chloride 10%, potassium chloride 10%, sodium chloride 0.9%, Flushing PICC Protocol **AND** sodium chloride 0.9% **AND** sodium chloride 0.9%    Review of patient's  allergies indicates:   Allergen Reactions    Iodine and iodide containing products      Objective:     Vital Signs (Most Recent):  Temp: 98.1 °F (36.7 °C) (02/10/20 0715)  Pulse: (!) 111 (02/10/20 1015)  Resp: (!) 21 (02/10/20 1015)  BP: (!) 90/0 (02/10/20 0900)  SpO2: 99 % (02/10/20 0500) Vital Signs (24h Range):  Temp:  [97.8 °F (36.6 °C)-98.5 °F (36.9 °C)] 98.1 °F (36.7 °C)  Pulse:  [] 111  Resp:  [15-40] 21  SpO2:  [99 %-100 %] 99 %  BP: ()/(0-72) 90/0  Arterial Line BP: ()/(63-81) 97/75     No data found.  Body mass index is 29.83 kg/m².      Intake/Output Summary (Last 24 hours) at 2/10/2020 1112  Last data filed at 2/10/2020 1000  Gross per 24 hour   Intake 1984 ml   Output 3485 ml   Net -1501 ml       Hemodynamic Parameters:           Physical Exam   Constitutional: He is oriented to person, place, and time. He appears well-developed and well-nourished.   HENT:   Head: Normocephalic and atraumatic.   Cardiovascular: Normal rate and regular rhythm.   LVAD in place  Right and left mediastinal chest tubes ss output  Introducer in place   Pulmonary/Chest: Effort normal. No respiratory distress.   NC   Abdominal: Soft.   Genitourinary:   Genitourinary Comments: Mc cath in place   Neurological: He is alert and oriented to person, place, and time.   Skin: Skin is warm and dry.   Psychiatric: He has a normal mood and affect. His behavior is normal.   Nursing note and vitals reviewed.      Significant Labs:  CBC:  Recent Labs   Lab 02/09/20  0353  02/09/20  0803 02/09/20  0912 02/10/20  0401   WBC 18.52*  --  18.35*  --  17.80*   RBC 3.79*  --  3.69*  --  3.62*   HGB 9.7*  --  9.5*  --  9.3*   HCT 30.9*   < > 30.4* 30* 30.0*     --  211  --  236   MCV 82  --  82  --  83   MCH 25.6*  --  25.7*  --  25.7*   MCHC 31.4*  --  31.3*  --  31.0*    < > = values in this interval not displayed.     BNP:  Recent Labs   Lab 02/05/20  0300 02/06/20  0334 02/10/20  0401   BNP 1,865* 1,420* 887*      CMP:  Recent Labs   Lab 02/08/20  0407  02/09/20  0353  02/09/20  2343 02/10/20  0401 02/10/20  0809   *   < > 136*   < > 130* 133*  133* 123*   CALCIUM 9.4   < > 9.6   < > 9.9 9.6  9.6 9.7   ALBUMIN 3.1*  --  2.9*  --   --  2.8*  2.8*  --    PROT 7.6  --  7.8  --   --  7.9  7.9  --       < > 138   < > 142 143  143 143   K 4.5   < > 4.3   < > 3.8 3.8  3.8 4.3   CO2 21*   < > 25   < > 26 25  25 27      < > 102   < > 103 103  103 105   BUN 33*   < > 41*   < > 46* 49*  49* 50*   CREATININE 1.3   < > 1.3   < > 1.4 1.3  1.3 1.3   ALKPHOS 95  --  99  --   --  107  107  --    ALT 22  --  20  --   --  23  23  --    AST 85*  --  52*  --   --  41*  41*  --    BILITOT 1.2*  --  1.1*  --   --  1.4*  1.4*  --     < > = values in this interval not displayed.      Coagulation:   Recent Labs   Lab 02/09/20  0353 02/09/20  0803  02/09/20  2343 02/10/20  0401 02/10/20  0628   INR 1.1 1.2  --   --  1.1  --    APTT 33.8*  33.8* 31.3   < > 35.9* 30.4  30.4 42.0*    < > = values in this interval not displayed.     LDH:  Recent Labs   Lab 02/08/20  0407 02/09/20  0353 02/10/20  0401   * 488* 446*     Microbiology:  Microbiology Results (last 7 days)     Procedure Component Value Units Date/Time    Blood culture [[id number]] Collected:  02/03/20 0946    Order Status:  Completed Specimen:  Blood from Peripheral, Hand, Right Updated:  02/08/20 1022     Blood Culture, Routine No growth after 5 days.    Blood culture [[id number]] Collected:  02/03/20 0946    Order Status:  Completed Specimen:  Blood from Peripheral, Hand, Right Updated:  02/08/20 1022     Blood Culture, Routine No growth after 5 days.          I have reviewed all pertinent labs within the past 24 hours.    Estimated Creatinine Clearance: 67.4 mL/min (based on SCr of 1.3 mg/dL).    Diagnostic Results:  I have reviewed and interpreted all pertinent imaging results/findings within the past 24 hours.    Assessment and Plan:     54 yo BM  with history of nonischemic CMP with EF 20% with chronic heart failure s/p ICD implantation for primary prevention on 2/15/19 (Medtronic), tobacco and alcohol abuse (quit 2018), hx of DVT right leg, HTN. Chronic MARC - Class II-III and mild LE edema.      Hospital Course (synopsis of major diagnoses, care, treatment, and services provided during the course of the hospital stay):  -2/12 admit to CDU for diuresis with IV lasix  -IV abx   -CXR with suspected small left pleural effusion with associated left basilar atelectasis versus evolving airspace disease  -2/13 single chamber ICD implant; IV lasix decreased to BID  -2/16 add dobutamine, hold BB due to hypotension, no ACE-I or ARB due to recent HPI hypotension  -2/17 Lasix changed to PO  -2/18 dobutamine discontinued    Admitted to [facility] on Thursday due to severe SOB for a week with associated orthopnea, MARC, and PND unable to lie flat and found to be in acute diastolic heart failure. States he normally weighs around 219 but up to 254lb on admission tonight. Says he hasn't been the most compliant in terms of fluid restriction and daily weights but has avoided salt. BNP on admission was 2375. BUN/CRT 32/1.4 He was started on IV diuretics but continued to deteriorate. Creatinine continued to increase and reached 4.3 with oliguria. He was started on CRRT for a few days and tolerated well. Renal u/s was negative for obstruction and liver u/s without findings of cirrhosis. All of this was progression of cardiorenal syndrome with liver congestion from severe volume overload. On arrival vitals stable and in no acute distress. He has obvious anasarca up to the chest. He did have uop of 500 at time of arrival also.    TTE 5/28/19  · Moderate left ventricular enlargement.  · Severely decreased left ventricular systolic function. The estimated ejection fraction is 20%  · Global hypokinetic wall motion.  · Grade III (severe) left ventricular diastolic dysfunction  consistent with restrictive physiology.  · Severe left atrial enlargement.  · Moderate right ventricular enlargement.  · Low normal right ventricular systolic function.  · Mild right atrial enlargement.  · Moderate mitral regurgitation.  Mild to moderate tricuspid regurgitation    * Presence of left ventricular assist device (LVAD)  -HeartMate 3 Implanted 02/06/2020 as DT. Chest closed 2/7.   -CTS Primary.  -Implanted by Dr. Joshi.  -INR sub therapeutic Coumadin, Goal INR 2.0-3.0. Anticoag per CTS.   -Antiplatelets  mg.  -LDH is stable overall today. Will continue to monitor daily.  -Continues on Epi, , Cardene, Radha.  -CVP elevated. Continue aggressive diuresis.   -Speed set at 5200, LSL 4800 rpm.  -Not listed for OHTx.  Procedure: Device Interrogation Including analysis of device parameters.  Current Settings: Ventricular Assist Device.  Review of device function is stable/unstable stable.    TXP LVAD INTERROGATIONS 2/10/2020 2/10/2020 2/10/2020 2/10/2020 2/10/2020 2/10/2020 2/10/2020   Type HeartMate3 HeartMate3 HeartMate3 HeartMate3 - - -   Flow 4.3 4.3 4.4 4.2 4.3 4.3 4.4   Speed 5200 5200 5200 5200 5200 5200 5250   PI 3.9 3.7 3.3 4.5 3.7 3.8 3.5   Power (Centeno) 3.8 3.8 3.8 3.8 3.8 3.8 3.8   LSL - - - 4800 - - -   Pulsatility - - - Pulse - - -       Acute hyperglycemia  -HgA1C 6.6  -Endocrine following    Moderate malnutrition  - Boost glucose control added 1/27   - Prealbumin is 23    Morbid obesity  -Weight down considerably since admit with diuresis.    ANJELICA (acute kidney injury)  -Creatinine was 3.0 on admit and got as high as 4.3 at OSH prior to transfer. Renal function was normal 8 months ago.  -Trending down today.     Cardiogenic shock  -NICM; Likely Etoh induced  -Transferred from [facility] with IABP at 1:1 for further level of care. IABP removed 1/25 and replaced 2/3.   -S/P HMIII on 2/3.     Deep vein thrombosis (DVT) of right lower extremity  -Unsure of timing but was on eliquis  PTA.   -Will anticoag with heparin/warfarin post VAD.     AICD (automatic cardioverter/defibrillator) present  -Medtronic single chamber ICD placed 2019 for primary prevention        CONG Selby  Heart Transplant  [facility]

## 2020-02-10 NOTE — CARE UPDATE
Patient extubated to 10ppm Nitric with 5L NC per order. Patient tolerated well and will continue to monitor.

## 2020-02-11 LAB
ALBUMIN SERPL BCP-MCNC: 3 G/DL (ref 3.5–5.2)
ALLENS TEST: ABNORMAL
ALLENS TEST: ABNORMAL
ALP SERPL-CCNC: 130 U/L (ref 55–135)
ALT SERPL W/O P-5'-P-CCNC: 34 U/L (ref 10–44)
ANION GAP SERPL CALC-SCNC: 10 MMOL/L (ref 8–16)
ANION GAP SERPL CALC-SCNC: 12 MMOL/L (ref 8–16)
ANION GAP SERPL CALC-SCNC: 14 MMOL/L (ref 8–16)
APTT BLDCRRT: 49.5 SEC (ref 21–32)
APTT BLDCRRT: 49.5 SEC (ref 21–32)
APTT BLDCRRT: 50.3 SEC (ref 21–32)
AST SERPL-CCNC: 53 U/L (ref 10–40)
BASOPHILS # BLD AUTO: 0.07 K/UL (ref 0–0.2)
BASOPHILS NFR BLD: 0.3 % (ref 0–1.9)
BILIRUB DIRECT SERPL-MCNC: 1 MG/DL (ref 0.1–0.3)
BILIRUB SERPL-MCNC: 1.4 MG/DL (ref 0.1–1)
BUN SERPL-MCNC: 48 MG/DL (ref 6–20)
BUN SERPL-MCNC: 48 MG/DL (ref 6–20)
BUN SERPL-MCNC: 49 MG/DL (ref 6–20)
BUN SERPL-MCNC: 49 MG/DL (ref 6–20)
BUN SERPL-MCNC: 50 MG/DL (ref 6–20)
BUN SERPL-MCNC: 52 MG/DL (ref 6–20)
BUN SERPL-MCNC: 53 MG/DL (ref 6–20)
CALCIUM SERPL-MCNC: 10 MG/DL (ref 8.7–10.5)
CALCIUM SERPL-MCNC: 10.2 MG/DL (ref 8.7–10.5)
CALCIUM SERPL-MCNC: 10.2 MG/DL (ref 8.7–10.5)
CALCIUM SERPL-MCNC: 10.3 MG/DL (ref 8.7–10.5)
CALCIUM SERPL-MCNC: 10.3 MG/DL (ref 8.7–10.5)
CALCIUM SERPL-MCNC: 10.4 MG/DL (ref 8.7–10.5)
CALCIUM SERPL-MCNC: 10.4 MG/DL (ref 8.7–10.5)
CHLORIDE SERPL-SCNC: 103 MMOL/L (ref 95–110)
CHLORIDE SERPL-SCNC: 103 MMOL/L (ref 95–110)
CHLORIDE SERPL-SCNC: 105 MMOL/L (ref 95–110)
CHLORIDE SERPL-SCNC: 106 MMOL/L (ref 95–110)
CHLORIDE SERPL-SCNC: 107 MMOL/L (ref 95–110)
CO2 SERPL-SCNC: 26 MMOL/L (ref 23–29)
CO2 SERPL-SCNC: 29 MMOL/L (ref 23–29)
CO2 SERPL-SCNC: 29 MMOL/L (ref 23–29)
CO2 SERPL-SCNC: 30 MMOL/L (ref 23–29)
CO2 SERPL-SCNC: 31 MMOL/L (ref 23–29)
CREAT SERPL-MCNC: 1.2 MG/DL (ref 0.5–1.4)
CREAT SERPL-MCNC: 1.3 MG/DL (ref 0.5–1.4)
CREAT SERPL-MCNC: 1.4 MG/DL (ref 0.5–1.4)
DIFFERENTIAL METHOD: ABNORMAL
EOSINOPHIL # BLD AUTO: 0.4 K/UL (ref 0–0.5)
EOSINOPHIL NFR BLD: 1.9 % (ref 0–8)
ERYTHROCYTE [DISTWIDTH] IN BLOOD BY AUTOMATED COUNT: 18.3 % (ref 11.5–14.5)
EST. GFR  (AFRICAN AMERICAN): >60 ML/MIN/1.73 M^2
EST. GFR  (NON AFRICAN AMERICAN): 56.2 ML/MIN/1.73 M^2
EST. GFR  (NON AFRICAN AMERICAN): >60 ML/MIN/1.73 M^2
GLUCOSE SERPL-MCNC: 124 MG/DL (ref 70–110)
GLUCOSE SERPL-MCNC: 126 MG/DL (ref 70–110)
GLUCOSE SERPL-MCNC: 135 MG/DL (ref 70–110)
GLUCOSE SERPL-MCNC: 135 MG/DL (ref 70–110)
GLUCOSE SERPL-MCNC: 137 MG/DL (ref 70–110)
GLUCOSE SERPL-MCNC: 152 MG/DL (ref 70–110)
GLUCOSE SERPL-MCNC: 95 MG/DL (ref 70–110)
HCO3 UR-SCNC: 32 MMOL/L (ref 24–28)
HCO3 UR-SCNC: 33.2 MMOL/L (ref 24–28)
HCT VFR BLD AUTO: 30.3 % (ref 40–54)
HGB BLD-MCNC: 9.5 G/DL (ref 14–18)
IMM GRANULOCYTES # BLD AUTO: 0.36 K/UL (ref 0–0.04)
IMM GRANULOCYTES NFR BLD AUTO: 1.8 % (ref 0–0.5)
INR PPP: 1.2 (ref 0.8–1.2)
LDH SERPL L TO P-CCNC: 1.02 MMOL/L (ref 0.36–1.25)
LDH SERPL L TO P-CCNC: 1.06 MMOL/L (ref 0.36–1.25)
LDH SERPL L TO P-CCNC: 434 U/L (ref 110–260)
LYMPHOCYTES # BLD AUTO: 1.2 K/UL (ref 1–4.8)
LYMPHOCYTES NFR BLD: 5.8 % (ref 18–48)
MAGNESIUM SERPL-MCNC: 2.4 MG/DL (ref 1.6–2.6)
MAGNESIUM SERPL-MCNC: 2.5 MG/DL (ref 1.6–2.6)
MAGNESIUM SERPL-MCNC: 2.6 MG/DL (ref 1.6–2.6)
MAGNESIUM SERPL-MCNC: 2.7 MG/DL (ref 1.6–2.6)
MCH RBC QN AUTO: 26.2 PG (ref 27–31)
MCHC RBC AUTO-ENTMCNC: 31.4 G/DL (ref 32–36)
MCV RBC AUTO: 84 FL (ref 82–98)
METHEMOGLOBIN: 0.8 % (ref 0–3)
MONOCYTES # BLD AUTO: 1.7 K/UL (ref 0.3–1)
MONOCYTES NFR BLD: 8.4 % (ref 4–15)
NEUTROPHILS # BLD AUTO: 16.7 K/UL (ref 1.8–7.7)
NEUTROPHILS NFR BLD: 81.8 % (ref 38–73)
NRBC BLD-RTO: 0 /100 WBC
PCO2 BLDA: 41.9 MMHG (ref 35–45)
PCO2 BLDA: 46.9 MMHG (ref 35–45)
PH SMN: 7.44 [PH] (ref 7.35–7.45)
PH SMN: 7.51 [PH] (ref 7.35–7.45)
PHOSPHATE SERPL-MCNC: 3.2 MG/DL (ref 2.7–4.5)
PLATELET # BLD AUTO: 279 K/UL (ref 150–350)
PMV BLD AUTO: 9.5 FL (ref 9.2–12.9)
PO2 BLDA: 102 MMHG (ref 80–100)
PO2 BLDA: 125 MMHG (ref 80–100)
POC BE: 10 MMOL/L
POC BE: 8 MMOL/L
POC SATURATED O2: 98 % (ref 95–100)
POC SATURATED O2: 99 % (ref 95–100)
POC TCO2: 33 MMOL/L (ref 23–27)
POC TCO2: 34 MMOL/L (ref 23–27)
POCT GLUCOSE: 135 MG/DL (ref 70–110)
POCT GLUCOSE: 139 MG/DL (ref 70–110)
POCT GLUCOSE: 154 MG/DL (ref 70–110)
POCT GLUCOSE: 91 MG/DL (ref 70–110)
POTASSIUM SERPL-SCNC: 3.8 MMOL/L (ref 3.5–5.1)
POTASSIUM SERPL-SCNC: 3.8 MMOL/L (ref 3.5–5.1)
POTASSIUM SERPL-SCNC: 4 MMOL/L (ref 3.5–5.1)
POTASSIUM SERPL-SCNC: 4.1 MMOL/L (ref 3.5–5.1)
POTASSIUM SERPL-SCNC: 4.2 MMOL/L (ref 3.5–5.1)
POTASSIUM SERPL-SCNC: 4.5 MMOL/L (ref 3.5–5.1)
POTASSIUM SERPL-SCNC: 4.8 MMOL/L (ref 3.5–5.1)
PROT SERPL-MCNC: 8.6 G/DL (ref 6–8.4)
PROTHROMBIN TIME: 11.7 SEC (ref 9–12.5)
RBC # BLD AUTO: 3.63 M/UL (ref 4.6–6.2)
SAMPLE: ABNORMAL
SAMPLE: ABNORMAL
SITE: ABNORMAL
SITE: ABNORMAL
SODIUM SERPL-SCNC: 145 MMOL/L (ref 136–145)
SODIUM SERPL-SCNC: 145 MMOL/L (ref 136–145)
SODIUM SERPL-SCNC: 146 MMOL/L (ref 136–145)
SODIUM SERPL-SCNC: 147 MMOL/L (ref 136–145)
SODIUM SERPL-SCNC: 148 MMOL/L (ref 136–145)
WBC # BLD AUTO: 20.42 K/UL (ref 3.9–12.7)

## 2020-02-11 PROCEDURE — 97535 SELF CARE MNGMENT TRAINING: CPT

## 2020-02-11 PROCEDURE — 99231 SBSQ HOSP IP/OBS SF/LOW 25: CPT | Mod: GC,,, | Performed by: SURGERY

## 2020-02-11 PROCEDURE — 25000003 PHARM REV CODE 250: Performed by: STUDENT IN AN ORGANIZED HEALTH CARE EDUCATION/TRAINING PROGRAM

## 2020-02-11 PROCEDURE — 97110 THERAPEUTIC EXERCISES: CPT

## 2020-02-11 PROCEDURE — 99232 SBSQ HOSP IP/OBS MODERATE 35: CPT | Mod: ,,, | Performed by: INTERNAL MEDICINE

## 2020-02-11 PROCEDURE — 97116 GAIT TRAINING THERAPY: CPT

## 2020-02-11 PROCEDURE — 63600175 PHARM REV CODE 636 W HCPCS: Performed by: STUDENT IN AN ORGANIZED HEALTH CARE EDUCATION/TRAINING PROGRAM

## 2020-02-11 PROCEDURE — 63600367 HC NITRIC OXIDE PER HOUR

## 2020-02-11 PROCEDURE — 63600175 PHARM REV CODE 636 W HCPCS: Performed by: THORACIC SURGERY (CARDIOTHORACIC VASCULAR SURGERY)

## 2020-02-11 PROCEDURE — 83735 ASSAY OF MAGNESIUM: CPT | Mod: 91

## 2020-02-11 PROCEDURE — 92610 EVALUATE SWALLOWING FUNCTION: CPT

## 2020-02-11 PROCEDURE — 99232 PR SUBSEQUENT HOSPITAL CARE,LEVL II: ICD-10-PCS | Mod: ,,, | Performed by: INTERNAL MEDICINE

## 2020-02-11 PROCEDURE — 25000003 PHARM REV CODE 250: Performed by: THORACIC SURGERY (CARDIOTHORACIC VASCULAR SURGERY)

## 2020-02-11 PROCEDURE — 84100 ASSAY OF PHOSPHORUS: CPT

## 2020-02-11 PROCEDURE — 80048 BASIC METABOLIC PNL TOTAL CA: CPT | Mod: 91

## 2020-02-11 PROCEDURE — 20000000 HC ICU ROOM

## 2020-02-11 PROCEDURE — 94799 UNLISTED PULMONARY SVC/PX: CPT

## 2020-02-11 PROCEDURE — 83605 ASSAY OF LACTIC ACID: CPT

## 2020-02-11 PROCEDURE — 27000221 HC OXYGEN, UP TO 24 HOURS

## 2020-02-11 PROCEDURE — A4216 STERILE WATER/SALINE, 10 ML: HCPCS | Performed by: THORACIC SURGERY (CARDIOTHORACIC VASCULAR SURGERY)

## 2020-02-11 PROCEDURE — 37799 UNLISTED PX VASCULAR SURGERY: CPT

## 2020-02-11 PROCEDURE — 85610 PROTHROMBIN TIME: CPT

## 2020-02-11 PROCEDURE — 94761 N-INVAS EAR/PLS OXIMETRY MLT: CPT

## 2020-02-11 PROCEDURE — 94667 MNPJ CHEST WALL 1ST: CPT

## 2020-02-11 PROCEDURE — C9113 INJ PANTOPRAZOLE SODIUM, VIA: HCPCS | Performed by: STUDENT IN AN ORGANIZED HEALTH CARE EDUCATION/TRAINING PROGRAM

## 2020-02-11 PROCEDURE — 83615 LACTATE (LD) (LDH) ENZYME: CPT

## 2020-02-11 PROCEDURE — 85025 COMPLETE CBC W/AUTO DIFF WBC: CPT

## 2020-02-11 PROCEDURE — 80076 HEPATIC FUNCTION PANEL: CPT

## 2020-02-11 PROCEDURE — 27000646 HC AEROBIKA DEVICE

## 2020-02-11 PROCEDURE — 99231 PR SUBSEQUENT HOSPITAL CARE,LEVL I: ICD-10-PCS | Mod: GC,,, | Performed by: SURGERY

## 2020-02-11 PROCEDURE — 93750 PR INTERROGATE VENT ASSIST DEV, IN PERSON, W PHYSICIAN ANALYSIS: ICD-10-PCS | Mod: ,,, | Performed by: INTERNAL MEDICINE

## 2020-02-11 PROCEDURE — 94664 DEMO&/EVAL PT USE INHALER: CPT

## 2020-02-11 PROCEDURE — 93750 INTERROGATION VAD IN PERSON: CPT | Mod: ,,, | Performed by: INTERNAL MEDICINE

## 2020-02-11 PROCEDURE — 85730 THROMBOPLASTIN TIME PARTIAL: CPT

## 2020-02-11 PROCEDURE — 82803 BLOOD GASES ANY COMBINATION: CPT

## 2020-02-11 PROCEDURE — 27000248 HC VAD-ADDITIONAL DAY

## 2020-02-11 PROCEDURE — 99900035 HC TECH TIME PER 15 MIN (STAT)

## 2020-02-11 PROCEDURE — 85730 THROMBOPLASTIN TIME PARTIAL: CPT | Mod: 91

## 2020-02-11 PROCEDURE — 97803 MED NUTRITION INDIV SUBSEQ: CPT

## 2020-02-11 RX ORDER — WARFARIN 4 MG/1
4 TABLET ORAL ONCE
Status: COMPLETED | OUTPATIENT
Start: 2020-02-11 | End: 2020-02-11

## 2020-02-11 RX ORDER — HYDROMORPHONE HYDROCHLORIDE 1 MG/ML
0.5 INJECTION, SOLUTION INTRAMUSCULAR; INTRAVENOUS; SUBCUTANEOUS EVERY 6 HOURS PRN
Status: DISCONTINUED | OUTPATIENT
Start: 2020-02-11 | End: 2020-02-13

## 2020-02-11 RX ORDER — DIPHENHYDRAMINE HYDROCHLORIDE 50 MG/ML
12.5 INJECTION INTRAMUSCULAR; INTRAVENOUS EVERY 6 HOURS PRN
Status: DISCONTINUED | OUTPATIENT
Start: 2020-02-11 | End: 2020-02-27 | Stop reason: HOSPADM

## 2020-02-11 RX ORDER — HEPARIN SODIUM 10000 [USP'U]/100ML
1100 INJECTION, SOLUTION INTRAVENOUS CONTINUOUS
Status: DISCONTINUED | OUTPATIENT
Start: 2020-02-11 | End: 2020-02-12

## 2020-02-11 RX ORDER — HEPARIN SODIUM 10000 [USP'U]/100ML
1200 INJECTION, SOLUTION INTRAVENOUS CONTINUOUS
Status: DISCONTINUED | OUTPATIENT
Start: 2020-02-11 | End: 2020-02-11

## 2020-02-11 RX ORDER — POLYETHYLENE GLYCOL 3350, SODIUM SULFATE ANHYDROUS, SODIUM BICARBONATE, SODIUM CHLORIDE, POTASSIUM CHLORIDE 236; 22.74; 6.74; 5.86; 2.97 G/4L; G/4L; G/4L; G/4L; G/4L
4000 POWDER, FOR SOLUTION ORAL ONCE
Status: COMPLETED | OUTPATIENT
Start: 2020-02-11 | End: 2020-02-11

## 2020-02-11 RX ORDER — DIPHENHYDRAMINE HYDROCHLORIDE 50 MG/ML
INJECTION INTRAMUSCULAR; INTRAVENOUS
Status: DISPENSED
Start: 2020-02-11 | End: 2020-02-11

## 2020-02-11 RX ORDER — AMLODIPINE BESYLATE 10 MG/1
10 TABLET ORAL DAILY
Status: DISCONTINUED | OUTPATIENT
Start: 2020-02-11 | End: 2020-02-27 | Stop reason: HOSPADM

## 2020-02-11 RX ADMIN — Medication 10 ML: at 06:02

## 2020-02-11 RX ADMIN — POLYETHYLENE GLYCOL 3350 17 G: 17 POWDER, FOR SOLUTION ORAL at 09:02

## 2020-02-11 RX ADMIN — AMLODIPINE BESYLATE 10 MG: 10 TABLET ORAL at 09:02

## 2020-02-11 RX ADMIN — HYDRALAZINE HYDROCHLORIDE 10 MG: 20 INJECTION INTRAMUSCULAR; INTRAVENOUS at 02:02

## 2020-02-11 RX ADMIN — EPINEPHRINE 0.05 MCG/KG/MIN: 1 INJECTION INTRAMUSCULAR; INTRAVENOUS; SUBCUTANEOUS at 06:02

## 2020-02-11 RX ADMIN — MUPIROCIN: 20 OINTMENT TOPICAL at 10:02

## 2020-02-11 RX ADMIN — ASPIRIN 325 MG ORAL TABLET 325 MG: 325 PILL ORAL at 09:02

## 2020-02-11 RX ADMIN — PANTOPRAZOLE SODIUM 40 MG: 40 INJECTION, POWDER, FOR SOLUTION INTRAVENOUS at 09:02

## 2020-02-11 RX ADMIN — CHLOROTHIAZIDE SODIUM 250 MG: 500 INJECTION, POWDER, LYOPHILIZED, FOR SOLUTION INTRAVENOUS at 10:02

## 2020-02-11 RX ADMIN — OXYCODONE HYDROCHLORIDE 10 MG: 10 TABLET ORAL at 05:02

## 2020-02-11 RX ADMIN — NICARDIPINE HYDROCHLORIDE 5 MG/HR: 0.2 INJECTION, SOLUTION INTRAVENOUS at 05:02

## 2020-02-11 RX ADMIN — FUROSEMIDE 15 MG/HR: 10 INJECTION, SOLUTION INTRAMUSCULAR; INTRAVENOUS at 10:02

## 2020-02-11 RX ADMIN — POTASSIUM CHLORIDE 40 MEQ: 20 SOLUTION ORAL at 05:02

## 2020-02-11 RX ADMIN — ACETAZOLAMIDE 500 MG: 500 INJECTION, POWDER, LYOPHILIZED, FOR SOLUTION INTRAVENOUS at 07:02

## 2020-02-11 RX ADMIN — HYDRALAZINE HYDROCHLORIDE 10 MG: 20 INJECTION INTRAMUSCULAR; INTRAVENOUS at 10:02

## 2020-02-11 RX ADMIN — POLYETHYLENE GLYCOL 3350, SODIUM SULFATE ANHYDROUS, SODIUM BICARBONATE, SODIUM CHLORIDE, POTASSIUM CHLORIDE 4000 ML: 236; 22.74; 6.74; 5.86; 2.97 POWDER, FOR SOLUTION ORAL at 11:02

## 2020-02-11 RX ADMIN — DIPHENHYDRAMINE HYDROCHLORIDE 12.5 MG: 50 INJECTION, SOLUTION INTRAMUSCULAR; INTRAVENOUS at 02:02

## 2020-02-11 RX ADMIN — HEPARIN SODIUM AND DEXTROSE 1100 UNITS/HR: 10000; 5 INJECTION INTRAVENOUS at 06:02

## 2020-02-11 RX ADMIN — POTASSIUM CHLORIDE 20 MEQ: 20 SOLUTION ORAL at 10:02

## 2020-02-11 RX ADMIN — POTASSIUM CHLORIDE 20 MEQ: 20 SOLUTION ORAL at 09:02

## 2020-02-11 RX ADMIN — WARFARIN SODIUM 4 MG: 4 TABLET ORAL at 06:02

## 2020-02-11 RX ADMIN — Medication 800 MG: at 10:02

## 2020-02-11 RX ADMIN — MUPIROCIN: 20 OINTMENT TOPICAL at 09:02

## 2020-02-11 NOTE — PROGRESS NOTES
[facility]  Critical Care - Surgery  Progress Note    Patient Name: Saba Lott  MRN: [id number]  Admission Date: 1/15/2020  Hospital Length of Stay: 27 days  Code Status: Prior  Attending Provider: David Joshi MD  Primary Care Provider: Primary Doctor No   Principal Problem: Presence of left ventricular assist device (LVAD)    Subjective:     Hospital/ICU Course:  No notes on file    Interval History/Significant Events: POD 5 LVAD placement and POD 4 chest closure. No acute events overnight. Patient extubated yesterday. On 3L NC. Epi 0.04,  5, Radha 5, Lasix 15    Follow-up For: Procedure(s) (LRB):  CLOSURE, WOUND, STERNUM (N/A)  INSERTION-RIGHT VENTRICULAR ASSIST DEVICE (Right)    Post-Operative Day: 4    Objective:     Vital Signs (Most Recent):  Temp: 98.8 °F (37.1 °C) (02/11/20 1100)  Pulse: 106 (02/11/20 1400)  Resp: 18 (02/11/20 1400)  BP: (!) 90/0 (02/11/20 1132)  SpO2: 99 % (02/11/20 0300) Vital Signs (24h Range):  Temp:  [98 °F (36.7 °C)-98.8 °F (37.1 °C)] 98.8 °F (37.1 °C)  Pulse:  [] 106  Resp:  [11-42] 18  SpO2:  [99 %-100 %] 99 %  BP: ()/(0) 90/0  Arterial Line BP: ()/(64-82) 91/73     Weight: 104 kg (229 lb 4.5 oz)  Body mass index is 35.91 kg/m².      Intake/Output Summary (Last 24 hours) at 2/11/2020 1419  Last data filed at 2/11/2020 1300  Gross per 24 hour   Intake 1984 ml   Output 3181 ml   Net -1197 ml       Physical Exam   Constitutional: He is oriented to person, place, and time. He appears well-developed and well-nourished.   HENT:   Head: Normocephalic and atraumatic.   Cardiovascular: Normal rate and regular rhythm.   LVAD in place  Right and left mediastinal chest tubes ss output  Introducer in place   Pulmonary/Chest: Effort normal. No respiratory distress.   NC   Abdominal: Soft.   Genitourinary:   Genitourinary Comments: Mc cath in place   Neurological: He is alert and oriented to person, place, and time.   Skin: Skin is warm and dry.    Psychiatric: He has a normal mood and affect. His behavior is normal.   Nursing note and vitals reviewed.      Vents: N/A    Lines/Drains/Airways     Peripherally Inserted Central Catheter Line                 PICC Triple Lumen 02/08/20 1530 right basilic 2 days          Central Venous Catheter Line                 Percutaneous Central Line Insertion/Assessment - Quad lumen  02/06/20 0742 5 days          Drain                 Chest Tube 02/06/20 1250 Left Mediastinal 5 days         Chest Tube 02/06/20 1250 Right Mediastinal 5 days         NG/OG Tube 02/08/20 1215 nasogastric Right nostril 3 days          Arterial Line                 Arterial Line 02/06/20 0736 Left Other (Comment) 5 days          Line                 VAD 02/06/20 1047 Left ventricular assist device HeartMate 3 5 days          Peripheral Intravenous Line                 Peripheral IV - Single Lumen 02/10/20 1400 18 G Left Wrist 1 day                Significant Labs:    CBC/Anemia Profile:  Recent Labs   Lab 02/10/20  0401 02/10/20  1615 02/11/20  0404   WBC 17.80* 19.67* 20.42*   HGB 9.3* 9.5* 9.5*   HCT 30.0* 30.9* 30.3*    254 279   MCV 83 84 84   RDW 18.4* 18.5* 18.3*        Chemistries:  Recent Labs   Lab 02/10/20  0401  02/11/20  0405 02/11/20  0755 02/11/20  1203     143   < > 145  145 148* 147*   K 3.8  3.8   < > 3.8  3.8 4.8 4.5     103   < > 103  103 107 106   CO2 25  25   < > 30*  30* 31* 29   BUN 49*  49*   < > 49*  49* 48* 50*   CREATININE 1.3  1.3   < > 1.2  1.2 1.3 1.4   CALCIUM 9.6  9.6   < > 10.3  10.3 10.2 10.2   ALBUMIN 2.8*  2.8*  --  3.0*  --   --    PROT 7.9  7.9  --  8.6*  --   --    BILITOT 1.4*  1.4*  --  1.4*  --   --    ALKPHOS 107  107  --  130  --   --    ALT 23  23  --  34  --   --    AST 41*  41*  --  53*  --   --    MG 2.5  2.5   < > 2.6  2.6 2.6 2.7*   PHOS 3.9  --  3.2  --   --     < > = values in this interval not displayed.       ABGs:   Recent Labs   Lab 02/11/20  2552    PH 7.442   PCO2 46.9*   HCO3 32.0*   POCSATURATED 98   BE 8     BMP:   Recent Labs   Lab 02/11/20  1203   GLU 95   *   K 4.5      CO2 29   BUN 50*   CREATININE 1.4   CALCIUM 10.2   MG 2.7*     CMP:   Recent Labs   Lab 02/10/20  0401  02/11/20  0405 02/11/20  0755 02/11/20  1203     143   < > 145  145 148* 147*   K 3.8  3.8   < > 3.8  3.8 4.8 4.5     103   < > 103  103 107 106   CO2 25  25   < > 30*  30* 31* 29   *  133*   < > 135*  135* 137* 95   BUN 49*  49*   < > 49*  49* 48* 50*   CREATININE 1.3  1.3   < > 1.2  1.2 1.3 1.4   CALCIUM 9.6  9.6   < > 10.3  10.3 10.2 10.2   PROT 7.9  7.9  --  8.6*  --   --    ALBUMIN 2.8*  2.8*  --  3.0*  --   --    BILITOT 1.4*  1.4*  --  1.4*  --   --    ALKPHOS 107  107  --  130  --   --    AST 41*  41*  --  53*  --   --    ALT 23  23  --  34  --   --    ANIONGAP 15  15   < > 12  12 10 12   EGFRNONAA >60.0  >60.0   < > >60.0  >60.0 >60.0 56.2*    < > = values in this interval not displayed.     Coagulation:   Recent Labs   Lab 02/11/20 0405   INR 1.2   APTT 49.5*  49.5*     Lactic Acid: No results for input(s): LACTATE in the last 48 hours.  All pertinent labs within the past 24 hours have been reviewed.    Significant Imaging:  I have reviewed all pertinent imaging results/findings within the past 24 hours.     CXR: apical pneumo not well defined today    Assessment/Plan:     Non-ischemic cardiomyopathy  Plan: Extubated, speech consult, restart TF at 20    Neuro:   -Pain control: prn dilaudid and PO oxy  -No sedation    Pulmonary:   -extubated on 3L NC, continue to wean as tolerated  -Radha to 5  -lasix gtt at 15  -scheduled diuril 250 daily  -CPT q6hrs    Cardiac:  -MAP goal >65  -Epi 0.04 wean 0.01 q12hrs to off  5  -LVAD speed 5200, flows ~4.6  -LDH trending down  -CT output monitor every hour. D/c   -PICC in place  -amlodipine 10    Renal:   -Dc oralia today  -UOP adequate  -Bun/Cr 49/1.2  -lasix gtt at  15    Fluids/Electrolytes/Nutrition/GI:   -Nutritional status: NPO  -bar Mg, K  -NGT  -TF - holding until patient has BM  -SLP c/s  -250 free water flushes q8hrs  -bowel regimen: Miralax and docusate  -Golytely: 100 q4hrs    Hematology/Oncology:  -H/H stable 9.5/30.3  -INR/Plts 1.2/279  -Trend CBC  -coumadin 4  -hep 1100 goal 45 - 54    Infectious Disease:   -Afebrile  -WBC 20.42  -Abx: completed course of vanc and ancef    Endocrine:  -Glucose goal of 120-180  -Insulin gtt per endo    Dispo:  -Continue care in the ICU setting. Wean epi. OOBTC, PT/OT/SLP       Critical care was time spent personally by me on the following activities: development of treatment plan with patient or surrogate and bedside caregivers, discussions with consultants, evaluation of patient's response to treatment, examination of patient, ordering and performing treatments and interventions, ordering and review of laboratory studies, ordering and review of radiographic studies, pulse oximetry, re-evaluation of patient's condition.  This critical care time did not overlap with that of any other provider or involve time for any procedures.     Peggy Sanders MD  Critical Care - Surgery  [facility]

## 2020-02-11 NOTE — PROGRESS NOTES
[facility]  Heart Transplant  Progress Note    Patient Name: Saba Lott  MRN: [id number]  Admission Date: 1/15/2020  Hospital Length of Stay: 27 days  Attending Physician: David Joshi MD  Primary Care Provider: Primary Doctor No  Principal Problem:Presence of left ventricular assist device (LVAD)    Subjective:     Interval History: CVP 14 remains on tube feeds    Continuous Infusions:   sodium chloride 0.9% 10 mL/hr at 02/07/20 1632    dexmedetomidine (PRECEDEX) infusion Stopped (02/10/20 0800)    dextrose 5 % and 0.45 % NaCl with KCl 40 mEq 10 mL/hr at 02/07/20 1632    DOBUTamine 5 mcg/kg/min (02/11/20 0600)    epinephrine 0.05 mcg/kg/min (02/11/20 0630)    furosemide (LASIX) 2 mg/mL continuous infusion (non-titrating) 20 mg/hr (02/11/20 0600)    heparin (porcine) in 5 % dex 1,100 Units/hr (02/11/20 0630)    nicardipine 2.5 mg/hr (02/11/20 0600)    nitric oxide gas       Scheduled Meds:   amLODIPine  5 mg Oral Daily    aspirin  325 mg Per NG tube Daily    diphenhydrAMINE        docusate sodium  100 mg Oral BID    ferrous gluconate  324 mg Oral Daily with breakfast    magnesium oxide  800 mg Oral TID    mupirocin   Nasal BID    pantoprazole  40 mg Intravenous Daily    polyethylene glycol  17 g Oral Daily    potassium chloride 10%  20 mEq Per NG tube BID    sodium chloride 0.9%  10 mL Intravenous Q6H     PRN Meds:albumin human 5%, albuterol sulfate, bisacodyL, Dextrose 10% Bolus, Dextrose 10% Bolus, Dextrose 10% Bolus, Dextrose 10% Bolus, Dextrose 10% Bolus, diphenhydrAMINE, fentaNYL, glucagon (human recombinant), hydrALAZINE, insulin aspart U-100, magnesium hydroxide 400 mg/5 ml, magnesium sulfate IVPB, oxyCODONE, oxyCODONE, potassium chloride 10%, potassium chloride 10%, potassium chloride 10%, potassium chloride 10%, sodium chloride 0.9%, Flushing PICC Protocol **AND** sodium chloride 0.9% **AND** sodium chloride 0.9%    Review of patient's allergies indicates:   Allergen  Reactions    Iodine and iodide containing products      Objective:     Vital Signs (Most Recent):  Temp: 98.7 °F (37.1 °C) (02/11/20 0700)  Pulse: 105 (02/11/20 0715)  Resp: 18 (02/11/20 0715)  BP: (!) 88/0 (02/11/20 0722)  SpO2: 99 % (02/11/20 0300) Vital Signs (24h Range):  Temp:  [97.9 °F (36.6 °C)-98.7 °F (37.1 °C)] 98.7 °F (37.1 °C)  Pulse:  [] 105  Resp:  [13-42] 18  SpO2:  [99 %-100 %] 99 %  BP: ()/(0-77) 88/0  Arterial Line BP: ()/(63-81) 89/75     Patient Vitals for the past 72 hrs (Last 3 readings):   Weight   02/11/20 0500 104 kg (229 lb 4.5 oz)   02/10/20 1300 119 kg (262 lb 5.6 oz)     Body mass index is 35.91 kg/m².      Intake/Output Summary (Last 24 hours) at 2/11/2020 0742  Last data filed at 2/11/2020 0700  Gross per 24 hour   Intake 2304 ml   Output 3826 ml   Net -1522 ml       Hemodynamic Parameters:         Physical Exam   Constitutional: He is oriented to person, place, and time. He appears well-developed and well-nourished.   HENT:   Head: Normocephalic and atraumatic.   Cardiovascular: Normal rate and regular rhythm.   LVAD in place  Right and left mediastinal chest tubes ss output  Introducer in place   Pulmonary/Chest: Effort normal. No respiratory distress.   NC   Abdominal: Soft.   Genitourinary:   Genitourinary Comments: Mc cath in place   Neurological: He is alert and oriented to person, place, and time.   Skin: Skin is warm and dry.   Psychiatric: He has a normal mood and affect. His behavior is normal.   Nursing note and vitals reviewed.      Significant Labs:  CBC:  Recent Labs   Lab 02/10/20  0401 02/10/20  1615 02/11/20  0404   WBC 17.80* 19.67* 20.42*   RBC 3.62* 3.66* 3.63*   HGB 9.3* 9.5* 9.5*   HCT 30.0* 30.9* 30.3*    254 279   MCV 83 84 84   MCH 25.7* 26.0* 26.2*   MCHC 31.0* 30.7* 31.4*     BNP:  Recent Labs   Lab 02/05/20  0300 02/06/20  0334 02/10/20  0401   BNP 1,865* 1,420* 887*     CMP:  Recent Labs   Lab 02/09/20  0353  02/10/20  0401   02/10/20  2003 02/11/20  0002 02/11/20  0405   *   < > 133*  133*   < > 134* 152* 135*  135*   CALCIUM 9.6   < > 9.6  9.6   < > 9.9 10.0 10.3  10.3   ALBUMIN 2.9*  --  2.8*  2.8*  --   --   --  3.0*   PROT 7.8  --  7.9  7.9  --   --   --  8.6*      < > 143  143   < > 143 146* 145  145   K 4.3   < > 3.8  3.8   < > 4.0 4.1 3.8  3.8   CO2 25   < > 25  25   < > 29 26 30*  30*      < > 103  103   < > 103 106 103  103   BUN 41*   < > 49*  49*   < > 49* 48* 49*  49*   CREATININE 1.3   < > 1.3  1.3   < > 1.3 1.2 1.2  1.2   ALKPHOS 99  --  107  107  --   --   --  130   ALT 20  --  23  23  --   --   --  34   AST 52*  --  41*  41*  --   --   --  53*   BILITOT 1.1*  --  1.4*  1.4*  --   --   --  1.4*    < > = values in this interval not displayed.      Coagulation:   Recent Labs   Lab 02/09/20  0803  02/10/20  0401  02/10/20  1300 02/10/20  2003 02/11/20  0405   INR 1.2  --  1.1  --   --   --  1.2   APTT 31.3   < > 30.4  30.4   < > 40.2* 43.7* 49.5*  49.5*    < > = values in this interval not displayed.     LDH:  Recent Labs   Lab 02/09/20  0353 02/10/20  0401 02/11/20  0405   * 446* 434*     Microbiology:  Microbiology Results (last 7 days)     Procedure Component Value Units Date/Time    Blood culture [[id number]] Collected:  02/03/20 0946    Order Status:  Completed Specimen:  Blood from Peripheral, Hand, Right Updated:  02/08/20 1022     Blood Culture, Routine No growth after 5 days.    Blood culture [[id number]] Collected:  02/03/20 0946    Order Status:  Completed Specimen:  Blood from Peripheral, Hand, Right Updated:  02/08/20 1022     Blood Culture, Routine No growth after 5 days.          I have reviewed all pertinent labs within the past 24 hours.    Estimated Creatinine Clearance: 80 mL/min (based on SCr of 1.2 mg/dL).    Diagnostic Results:  I have reviewed and interpreted all pertinent imaging results/findings within the past 24 hours.    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 Chronic MARC - Class II-III and mild LE edema.      Hospital Course (synopsis of major diagnoses, care, treatment, and services provided during the course of the hospital stay):  -2/12 admit to CDU for diuresis with IV lasix  -IV abx   -CXR with suspected small left pleural effusion with associated left basilar atelectasis versus evolving airspace disease  -2/13 single chamber ICD implant; IV lasix decreased to BID  -2/16 add dobutamine, hold BB due to hypotension, no ACE-I or ARB due to recent HPI hypotension  -2/17 Lasix changed to PO  -2/18 dobutamine discontinued    Admitted to [facility] on Thursday due to severe SOB for a week with associated orthopnea, MARC, and PND unable to lie flat and found to be in acute diastolic heart failure. States he normally weighs around 219 but up to 254lb on admission tonight. Says he hasn't been the most compliant in terms of fluid restriction and daily weights but has avoided salt. BNP on admission was 2375. BUN/CRT 32/1.4 He was started on IV diuretics but continued to deteriorate. Creatinine continued to increase and reached 4.3 with oliguria. He was started on CRRT for a few days and tolerated well. Renal u/s was negative for obstruction and liver u/s without findings of cirrhosis. All of this was progression of cardiorenal syndrome with liver congestion from severe volume overload. On arrival vitals stable and in no acute distress. He has obvious anasarca up to the chest. He did have uop of 500 at time of arrival also.    TTE 5/28/19  · Moderate left ventricular enlargement.  · Severely decreased left ventricular systolic function. The estimated ejection fraction is 20%  · Global hypokinetic wall motion.  · Grade III (severe) left ventricular diastolic  dysfunction consistent with restrictive physiology.  · Severe left atrial enlargement.  · Moderate right ventricular enlargement.  · Low normal right ventricular systolic function.  · Mild right atrial enlargement.  · Moderate mitral regurgitation.  Mild to moderate tricuspid regurgitation    * Presence of left ventricular assist device (LVAD)  -HeartMate 3 Implanted 02/06/2020 as DT. Chest closed 2/7.   -CTS Primary.  -Implanted by Dr. Joshi.  -INR sub therapeutic Coumadin, Goal INR 2.0-3.0. Anticoag per CTS.   -Antiplatelets  mg.  -LDH is stable overall today. Will continue to monitor daily.  -Continues on Epi, , Cardene, Radha.  -CVP elevated. Continue aggressive diuresis.   -Speed set at 5200, LSL 4800 rpm.  -Not listed for OHTx.  Procedure: Device Interrogation Including analysis of device parameters.  Current Settings: Ventricular Assist Device.  Review of device function is stable/unstable stable.    TXP LVAD INTERROGATIONS 2/11/2020 2/11/2020 2/11/2020 2/11/2020 2/11/2020 2/11/2020 2/11/2020   Type HeartMate3 HeartMate3 HeartMate3 HeartMate3 HeartMate3 HeartMate3 HeartMate3   Flow 4.4 4.7 4.5 4.4 4.6 4.5 4.4   Speed 5200 5200 5200 5200 5200 5200 5200   PI 4.4 3.3 3.7 4 3.3 3.7 3.6   Power (Centeno) 3.8 3.8 3.9 3.8 3.8 3.9 3.8   LSL 4800 4800 4800 4800 4800 4800 4800   Pulsatility Intermittent pulse Pulse Pulse Pulse Pulse Pulse Pulse       Acute hyperglycemia  -HgA1C 6.6  -Endocrine following    Moderate malnutrition  - Boost glucose control added 1/27   - Prealbumin is 23    Morbid obesity  -Weight down considerably since admit with diuresis.    ANJELICA (acute kidney injury)  -Creatinine was 3.0 on admit and got as high as 4.3 at OSH prior to transfer. Renal function was normal 8 months ago.  -Trending down today.     Cardiogenic shock  -NICM; Likely Etoh induced  -Transferred from [facility] with IABP at 1:1 for further level of care. IABP removed 1/25 and replaced 2/3.   -S/P HMIII on 2/3.      Deep vein thrombosis (DVT) of right lower extremity  -Unsure of timing but was on eliquis PTA.   -Will anticoag with heparin/warfarin post VAD.     AICD (automatic cardioverter/defibrillator) present  -Medtronic single chamber ICD placed 2019 for primary prevention        CONG Selby  Heart Transplant  [facility]

## 2020-02-11 NOTE — PROGRESS NOTES
Dr. Venegas notified ptt 43.7, goal 45-54. Heparin currently at 1,000 units/hr. MD ordered increase to 1100 units/hr. Notified Mag 2.7, 800 mg mag oxide ordered; MD ordered to hold upcoming dose at this time. Will continue to monitor.

## 2020-02-11 NOTE — PROGRESS NOTES
02/11/2020  Abelardo Sepulveda    Current provider:  David Joshi MD      I, Abelardo Sepulveda, rounded on Saba Lott to ensure all mechanical assist device settings (IABP or VAD) were appropriate and all parameters were within limits.  I was able to ensure all back up equipment was present, the staff had no issues, and the Perfusion Department daily rounding was complete.    12:17 PM

## 2020-02-11 NOTE — PROGRESS NOTES
"UPDATE    SW to pt's room for update today.  Pt is s/p LVAD implant on 2/6.  Pt presents alone in room, lying in bed, awake and alert, calm, and cooperative.  Pt reports he is feeling "okay" today.  Pt states he has been up out of bed with PT and states it "went well."  Pt reports coping adequately since surgery, and denies any needs or concerns to SW at this time. SW providing ongoing psychosocial and counseling support, education, resources, assistance, and discharge planning as indicated.  SW following and remains available.  "

## 2020-02-11 NOTE — PROGRESS NOTES
"[facility]  Adult Nutrition  Progress Note    SUMMARY       Recommendations  1. As medically able, ADAT to Cardiac with texture per SLP.   2. If remains NPO, recommend modifying TF to Impact Peptide 1.5 at 50 mL/hr - to provide 1800 kcal/day and 113g protein/day.   RD to monitor.    Goals: Patient to receive nutrition by RD follow-up  Nutrition Goal Status: progressing towards goal  Communication of RD Recs: discussed on rounds    Reason for Assessment  Reason For Assessment: RD follow-up  Diagnosis: surgery/postoperative complications(s/p LVAD )  Relevant Medical History: CHF, NICM, HTN  Interdisciplinary Rounds: attended  General Information Comments: Extubated yesterday. Remains NPO. TF restarted, tolerating at 20. NFPE completed , patient continues with moderate malnutriton. Noted significant weight loss since admission but likely fluid related loss since patient is -60.7L since admit.  Nutrition Discharge Planning: Unable to determine at this time.    Nutrition Risk Screen  Nutrition Risk Screen: no indicators present    Nutrition/Diet History  Patient Reported Diet/Restrictions/Preferences: general  Spiritual, Cultural Beliefs, Mandaeism Practices, Values that Affect Care: no  Factors Affecting Nutritional Intake: NPO    Anthropometrics  Temp: 98.8 °F (37.1 °C)  Height Method: Estimated  Height: 5' 7" (170.2 cm)  Height (inches): 67 in  Weight Method: Bed Scale  Weight: 104 kg (229 lb 4.5 oz)  Weight (lb): 229.28 lb  Ideal Body Weight (IBW), Male: 148 lb  % Ideal Body Weight, Male (lb): 130.94 %  BMI (Calculated): 35.9  BMI Grade: 35 - 39.9 - obesity - grade II  Usual Body Weight (UBW), k kg(admit weight)  % Usual Body Weight: 75.95  % Weight Change From Usual Weight: -24.21 %    Lab/Procedures/Meds  Pertinent Labs Reviewed: reviewed  Pertinent Labs Comments: Na 148, BUN 48, Glu 137, Alb 3.0  Pertinent Medications Reviewed: reviewed  Pertinent Medications Comments: docusate, " ferrous gluconate, coumadin, precedex, dobutamine, epinephrine, lasix, cardene    Estimated/Assessed Needs  Weight Used For Calorie Calculations: 86.4 kg (190 lb 7.6 oz)  Energy Calorie Requirements (kcal): 2073 kcal/day  Energy Need Method: [place]-St Jeor(x 1.25)  Protein Requirements: 104-121 g/day(1.2-1.4 g/kg)  Weight Used For Protein Calculations: 86.4 kg (190 lb 7.6 oz)  Fluid Requirements (mL): 1 mL/kcal or per MD  Estimated Fluid Requirement Method: RDA Method  RDA Method (mL): 2073    Nutrition Prescription Ordered  Current Diet Order: NPO  Current Nutrition Support Formula Ordered: Peptamen Intense VHP  Current Nutrition Support Rate Ordered: 20 (ml)  Current Nutrition Support Frequency Ordered: mL/hr    Evaluation of Received Nutrient/Fluid Intake  Enteral Calories (kcal): 480  Enteral Protein (gm): 44  Enteral (Free Water) Fluid (mL): 403  % Kcal Needs: 23%  % Protein Needs: 42%  I/O: -1.5L x 24hrs, -60.7L since admit  Energy Calories Required: not meeting needs  Protein Required: not meeting needs  Fluid Required: (per MD)  Comments: LBM 2/10  Tolerance: tolerating  % Intake of Estimated Energy Needs: 0 - 25 %  % Meal Intake: NPO    Nutrition Risk  Level of Risk/Frequency of Follow-up: high(2x/week)     Assessment and Plan  Moderate malnutrition  Nutrition Problem:  Moderate Protein-Calorie Malnutrition  Malnutrition in the context of Chronic Illness/Injury     Related to (etiology):  Lack of appetite in setting of chronic heart failure     Signs and Symptoms (as evidenced by):  Energy Intake: <75% of estimated energy requirement for > 3 months  Body Fat Depletion: mild and moderate depletion of orbitals and triceps   Muscle Mass Depletion: moderate depletion of interosseous muscle and lower extremities   Weight Loss: -24.2% x 1 month (mostly fluid related loss)   Fluid Accumulation: mild      Interventions(treatment strategy):  Collaboration of care with providers.     Nutrition Diagnosis  Status:  Continues    Monitor and Evaluation  Food and Nutrient Intake: energy intake  Food and Nutrient Adminstration: diet order, enteral and parenteral nutrition administration  Knowledge/Beliefs/Attitudes: food and nutrition knowledge/skill  Anthropometric Measurements: weight, weight change  Biochemical Data, Medical Tests and Procedures: electrolyte and renal panel, gastrointestinal profile, inflammatory profile  Nutrition-Focused Physical Findings: overall appearance     Malnutrition Assessment  Energy Intake (Malnutrition): less than 75% for greater than or equal to 3 months   Orbital Region (Subcutaneous Fat Loss): mild depletion  Upper Arm Region (Subcutaneous Fat Loss): moderate depletion  Thoracic and Lumbar Region: well nourished   Baptism Region (Muscle Loss): well nourished  Clavicle Bone Region (Muscle Loss): well nourished  Clavicle and Acromion Bone Region (Muscle Loss): well nourished  Dorsal Hand (Muscle Loss): moderate depletion  Patellar Region (Muscle Loss): moderate depletion  Anterior Thigh Region (Muscle Loss): moderate depletion  Posterior Calf Region (Muscle Loss): moderate depletion     Nutrition Follow-Up  RD Follow-up?: Yes

## 2020-02-11 NOTE — PT/OT/SLP PROGRESS
Occupational Therapy   Treatment    Name: Saba Lott  MRN: [id number]  Admitting Diagnosis:  Presence of left ventricular assist device (LVAD)  4 Days Post-Op    Recommendations:     Discharge Recommendations: home health OT  Discharge Equipment Recommendations:  none  Barriers to discharge:  None    Assessment:     Saba Lott is a 54 y.o. male with a medical diagnosis of Presence of left ventricular assist device (LVAD).  Performance deficits affecting function are weakness, decreased upper extremity function, gait instability, impaired cardiopulmonary response to activity, impaired endurance, impaired balance, impaired self care skills, pain, impaired functional mobilty.     Rehab Prognosis:  Good; patient would benefit from acute skilled OT services to address these deficits and reach maximum level of function.       Plan:     Patient to be seen 6 x/week to address the above listed problems via therapeutic activities, therapeutic exercises, self-care/home management  · Plan of Care Expires: 03/11/20  · Plan of Care Reviewed with: patient    Subjective     Pain/Comfort:  · Pain Rating 1: 8/10  · Location - Side 1: Bilateral  · Location - Orientation 1: midline  · Location 1: chest  · Pain Addressed 1: Reposition, Distraction  · Pain Rating Post-Intervention 1: 8/10    Objective:     Communicated with: RN prior to session.  Patient found supine with arterial line, blood pressure cuff, chest tube, telemetry, LVAD, PICC line, wound vac, barton catheter(Radha) upon OT entry to room.    General Precautions: Standard, fall, LVAD, sternal   Orthopedic Precautions:    Braces:       Occupational Performance:     Bed Mobility:    · Patient completed Rolling/Turning to Right with minimum assistance  · Patient completed Scooting/Bridging with minimum assistance  · Patient completed Supine to Sit with minimum assistance     Functional Mobility/Transfers:  · Patient completed Sit <> Stand Transfer with minimum assistance  with   no assistive device from EOB  · Patient completed Bed <> Chair Transfer using Step Transfer technique with minimum assistance with no assistive device  · Functional Mobility: Min A x 6 steps to chair with HHA    Activities of Daily Living:  · Grooming: stand by assistance for grooming and oral hygiene  · Upper Body Dressing: maximal assistance    · Lower Body Dressing: maximal assistance adjusting socks seated EOB in Figure 4 pattern      AMPAC 6 Click ADL: 13    Treatment & Education:  Pt ed on OT POC  Pt re-ed on LVAD sternal precautions  Pt ed on LVAD components including controller, DL and power cords  Pt re-ed on use of controller pouch and importance of securing to self prior to transitions  Pt donned controller pouch overhead with CGA  Pt stood at b/s table x 3 min with Min A 2* posterior lean while engaged in self-care as above  Pt ed on ROM ex's 3x daily for increased overall strength and endurance;  Pt completed 10 reps of AAROM for shoulder flexion     Patient left up in chair with all lines intact, call button in reach and RN notifiedEducation:      GOALS:   Multidisciplinary Problems     Occupational Therapy Goals        Problem: Occupational Therapy Goal    Goal Priority Disciplines Outcome Interventions   Occupational Therapy Goal     OT, PT/OT Ongoing, Progressing    Description:  Goals to be met by: 2/24/20     Patient will increase functional independence with ADLs by performing:    Feeding with [place].  UE Dressing with Supervision.  LE Dressing with Supervision.  Grooming while standing at sink with Supervision.  Toileting from toilet with Supervision for hygiene and clothing management.   Toilet transfer to toilet with Supervision.  Pt will be (I) with VAD management during ADL.               Multidisciplinary Problems (Resolved)        Problem: Occupational Therapy Goal    Goal Priority Disciplines Outcome Interventions   Occupational Therapy Goal   (Resolved)     OT, PT/OT Met     Description:  Goals to be met by: 7 days 1/30/20     Patient will increase functional independence with ADLs by performing:    Pt to complete UE dressing with set-up-MET  Pt to complete LE dressing with SBA with AE-MET   Pt to complete toileting with supervision.--MET  Pt to complete standing g/h skills with supervision.--MET  Pt to complete t/f to commode, bed and chair with SBA--MET                         Time Tracking:     OT Date of Treatment: 02/11/20  OT Start Time: 0830  OT Stop Time: 0855  OT Total Time (min): 25 min    Billable Minutes:Self Care/Home Management 15  Therapeutic Exercise 10    KARLY Artis  2/11/2020

## 2020-02-11 NOTE — ASSESSMENT & PLAN NOTE
-HeartMate 3 Implanted 02/06/2020 as DT. Chest closed 2/7.   -CTS Primary.  -Implanted by Dr. Joshi.  -INR sub therapeutic Coumadin, Goal INR 2.0-3.0. Anticoag per CTS.   -Antiplatelets  mg.  -LDH is stable overall today. Will continue to monitor daily.  -Continues on Epi, , Cardene, Radha.  -CVP elevated. Continue aggressive diuresis.   -Speed set at 5200, LSL 4800 rpm.  -Not listed for OHTx.  Procedure: Device Interrogation Including analysis of device parameters.  Current Settings: Ventricular Assist Device.  Review of device function is stable/unstable stable.    TXP LVAD INTERROGATIONS 2/11/2020 2/11/2020 2/11/2020 2/11/2020 2/11/2020 2/11/2020 2/11/2020   Type HeartMate3 HeartMate3 HeartMate3 HeartMate3 HeartMate3 HeartMate3 HeartMate3   Flow 4.4 4.7 4.5 4.4 4.6 4.5 4.4   Speed 5200 5200 5200 5200 5200 5200 5200   PI 4.4 3.3 3.7 4 3.3 3.7 3.6   Power (Centeno) 3.8 3.8 3.9 3.8 3.8 3.9 3.8   LSL 4800 4800 4800 4800 4800 4800 4800   Pulsatility Intermittent pulse Pulse Pulse Pulse Pulse Pulse Pulse

## 2020-02-11 NOTE — PLAN OF CARE
Problem: Occupational Therapy Goal  Goal: Occupational Therapy Goal  Description  Goals to be met by: 2/24/20     Patient will increase functional independence with ADLs by performing:    Feeding with [place].  UE Dressing with Supervision.  LE Dressing with Supervision.  Grooming while standing at sink with Supervision.  Toileting from toilet with Supervision for hygiene and clothing management.   Toilet transfer to toilet with Supervision.  Pt will be (I) with VAD management during ADL.    Outcome: Ongoing, Progressing   The above goals remain appropriate. KARLY Artis  2/11/2020

## 2020-02-11 NOTE — CARE UPDATE
BG goal 140 - 180   Diet NPO  4 Days Post-Op       BG is now within or below goal with no use of insulin therapy.      - Moderate Dose SQ Insulin Correction Scale.   - BG Monitoring every 4 hours while NPO.      ** Please call Endocrine for any BG related issues **  ** Please notify Endocrine for any change and/or advance in diet**     Discharge Recommendations:    TBD. Please notify endocrinology prior to discharge.

## 2020-02-11 NOTE — ASSESSMENT & PLAN NOTE
Plan: Extubated, speech consult, restart TF at 20    Neuro:   -Pain control: prn dilaudid and PO oxy  -No sedation    Pulmonary:   -extubated on 3L NC, continue to wean as tolerated  -Radha to 5  -lasix gtt at 15  -scheduled diuril 250 daily  -CPT q6hrs    Cardiac:  -MAP goal >65  -Epi 0.04 wean 0.01 q12hrs to off  5  -LVAD speed 5200, flows ~4.6  -LDH trending down  -CT output monitor every hour. D/c   -PICC in place  -amlodipine 10    Renal:   -Dc barton today  -UOP adequate  -Bun/Cr 49/1.2  -lasix gtt at 15    Fluids/Electrolytes/Nutrition/GI:   -Nutritional status: NPO  -bar Mg, K  -NGT  -TF - holding until patient has BM  -SLP c/s  -250 free water flushes q8hrs  -bowel regimen: Miralax and docusate  -Golytely: 100 q4hrs    Hematology/Oncology:  -H/H stable 9.5/30.3  -INR/Plts 1.2/279  -Trend CBC  -coumadin 4  -hep 1100 goal 45 - 54    Infectious Disease:   -Afebrile  -WBC 20.42  -Abx: completed course of vanc and ancef    Endocrine:  -Glucose goal of 120-180  -Insulin gtt per endo    Dispo:  -Continue care in the ICU setting. Wean epi. OOBTC, PT/OT/SLP

## 2020-02-11 NOTE — PLAN OF CARE
Problem: SLP Goal  Goal: SLP Goal  Description  Speech Language Pathology Goals  Goals expected to be met by 2/18/2020  1. Pt will participate in ongoing swallow assessment to determine safest, least restrictive diet  2. Educate Pt and family on S/S aspiration and aspiration precautions      Outcome: Ongoing, Progressing     SLP Bedside Swallow study Initiated. Pt presents with dysphagia and dysphonia.   Trials limited 2/2 pain and Pt acceptance, RN aware. REC: Continue NPO and ST to continue to follow for ongoing swallow assessment. Should PO medications be required, safest means would be crushed in puree. Occasional tsp bites puree ok for comfort provided strict aspiration precautions. ST To continue to follow.     LARISSA Doyle., [facility]-SLP  Speech-Language Pathology  Pager: [phone]  2/11/2020

## 2020-02-11 NOTE — PT/OT/SLP PROGRESS
Physical Therapy Treatment    Patient Name:  Saba Lott   MRN:  [id number]    Recommendations:     Discharge Recommendations:  home health PT   Discharge Equipment Recommendations: none   Barriers to discharge: None    Assessment:     Saba Lott is a 54 y.o. male admitted with a medical diagnosis of Presence of left ventricular assist device (LVAD).  He presents with the following impairments/functional limitations:  weakness, impaired endurance, impaired self care skills, impaired functional mobilty, decreased upper extremity function, impaired cardiopulmonary response to activity.    Rehab Prognosis: Good; patient would benefit from acute skilled PT services to address these deficits and reach maximum level of function.    Recent Surgery: Procedure(s) (LRB):  CLOSURE, WOUND, STERNUM (N/A)  WASHOUT  INSERTION, GRAFT, PERICARDIUM  APPLICATION, WOUND VAC 4 Days Post-Op    Plan:     During this hospitalization, patient to be seen 6 x/week to address the identified rehab impairments via gait training, therapeutic activities, therapeutic exercises, neuromuscular re-education and progress toward the following goals:    · Plan of Care Expires:  03/07/20    Subjective     Chief Complaint: none reported   Patient/Family Comments/goals: to get better and return home   Pain/Comfort:  · Pain Rating 1: 8/10(sternum)  · Pain Addressed 1: Reposition, Distraction  · Pain Rating Post-Intervention 1: 8/10      Objective:     Communicated with RN prior to session.  Patient found HOB elevated with telemetry, pulse ox (continuous), blood pressure cuff, LVAD, oxygen, central line, PICC line, peripheral IV, chest tube, barton catheter, arterial line, NG tube upon PT entry to room.     General Precautions: Standard, fall, LVAD, sternal   Orthopedic Precautions:N/A   Braces: N/A     Functional Mobility:  · Bed Mobility:     · Scooting: minimum assistance  · Supine to Sit: minimum assistance  · Transfers:     · Sit to Stand:  minimum  assistance with no AD x 3 trials from EOB  · Gait: 4ft with min A with HHA x2   · Pt demo'd decreased ladarius and decreased step length       AM-PAC 6 CLICK MOBILITY  Turning over in bed (including adjusting bedclothes, sheets and blankets)?: 3  Sitting down on and standing up from a chair with arms (e.g., wheelchair, bedside commode, etc.): 3  Moving from lying on back to sitting on the side of the bed?: 3  Moving to and from a bed to a chair (including a wheelchair)?: 3  Need to walk in hospital room?: 3  Climbing 3-5 steps with a railing?: 1  Basic Mobility Total Score: 16       Therapeutic Activities and Exercises:  Educated pt on PT role/POC  Educated pt on importance of OOB activity  Educated pt on sternal precautions  Pt verbalized understanding     Standing x 3 minutes with min A to perform ADLs with OT  Posterior lean    Sit to stand x 3 reps to increase B LE strength and to increase standing tolerance     T/f to chair to increase tolerance to OOB activity and to create optimal positioning for lung expansion     LVAD found and remained on wall power  No alarms sounded     Patient left up in chair with all lines intact, call button in reach and RN notified..    GOALS:   Multidisciplinary Problems     Physical Therapy Goals        Problem: Physical Therapy Goal    Goal Priority Disciplines Outcome Goal Variances Interventions   Physical Therapy Goal     PT, PT/OT Ongoing, Progressing     Description:  Goals to be met by: 3/9/2020     Patient will increase functional independence with mobility by performin. Supine to sit with CGA- not met  2. Sit to stand transfer with Supervision- not met  3. Gait  x 150 feet with Supervision - not met                           Time Tracking:     PT Received On: 20  PT Start Time: 834     PT Stop Time: 857  PT Total Time (min): 23 min     Billable Minutes: Gait Training 8 and Therapeutic Exercise 15    Treatment Type: Treatment  PT/PTA: PT     PTA Visit Number:  0     Eva Garza, PT, DPT  2/12/2020  [phone]

## 2020-02-11 NOTE — PLAN OF CARE
Recommendations  1. As medically able, ADAT to Cardiac with texture per SLP.   2. If remains NPO, recommend modifying TF to Impact Peptide 1.5 at 50 mL/hr - to provide 1800 kcal/day and 113g protein/day.   RD to monitor.    Goals: Patient to receive nutrition by RD follow-up  Nutrition Goal Status: progressing towards goal    Full assessment completed, see RD Note 2/11/2020.

## 2020-02-11 NOTE — SUBJECTIVE & OBJECTIVE
Interval History/Significant Events: POD 5 LVAD placement and POD 4 chest closure. No acute events overnight. Patient extubated yesterday. On 3L NC. Epi 0.04,  5, Radha 5, Lasix 15    Follow-up For: Procedure(s) (LRB):  CLOSURE, WOUND, STERNUM (N/A)  INSERTION-RIGHT VENTRICULAR ASSIST DEVICE (Right)    Post-Operative Day: 4    Objective:     Vital Signs (Most Recent):  Temp: 98.8 °F (37.1 °C) (02/11/20 1100)  Pulse: 106 (02/11/20 1400)  Resp: 18 (02/11/20 1400)  BP: (!) 90/0 (02/11/20 1132)  SpO2: 99 % (02/11/20 0300) Vital Signs (24h Range):  Temp:  [98 °F (36.7 °C)-98.8 °F (37.1 °C)] 98.8 °F (37.1 °C)  Pulse:  [] 106  Resp:  [11-42] 18  SpO2:  [99 %-100 %] 99 %  BP: ()/(0) 90/0  Arterial Line BP: ()/(64-82) 91/73     Weight: 104 kg (229 lb 4.5 oz)  Body mass index is 35.91 kg/m².      Intake/Output Summary (Last 24 hours) at 2/11/2020 1419  Last data filed at 2/11/2020 1300  Gross per 24 hour   Intake 1984 ml   Output 3181 ml   Net -1197 ml       Physical Exam   Constitutional: He is oriented to person, place, and time. He appears well-developed and well-nourished.   HENT:   Head: Normocephalic and atraumatic.   Cardiovascular: Normal rate and regular rhythm.   LVAD in place  Right and left mediastinal chest tubes ss output  Introducer in place   Pulmonary/Chest: Effort normal. No respiratory distress.   NC   Abdominal: Soft.   Genitourinary:   Genitourinary Comments: Mc cath in place   Neurological: He is alert and oriented to person, place, and time.   Skin: Skin is warm and dry.   Psychiatric: He has a normal mood and affect. His behavior is normal.   Nursing note and vitals reviewed.      Vents: N/A    Lines/Drains/Airways     Peripherally Inserted Central Catheter Line                 PICC Triple Lumen 02/08/20 1530 right basilic 2 days          Central Venous Catheter Line                 Percutaneous Central Line Insertion/Assessment - Quad lumen  02/06/20 0742 5 days          Drain                  Chest Tube 02/06/20 1250 Left Mediastinal 5 days         Chest Tube 02/06/20 1250 Right Mediastinal 5 days         NG/OG Tube 02/08/20 1215 nasogastric Right nostril 3 days          Arterial Line                 Arterial Line 02/06/20 0736 Left Other (Comment) 5 days          Line                 VAD 02/06/20 1047 Left ventricular assist device HeartMate 3 5 days          Peripheral Intravenous Line                 Peripheral IV - Single Lumen 02/10/20 1400 18 G Left Wrist 1 day                Significant Labs:    CBC/Anemia Profile:  Recent Labs   Lab 02/10/20  0401 02/10/20  1615 02/11/20  0404   WBC 17.80* 19.67* 20.42*   HGB 9.3* 9.5* 9.5*   HCT 30.0* 30.9* 30.3*    254 279   MCV 83 84 84   RDW 18.4* 18.5* 18.3*        Chemistries:  Recent Labs   Lab 02/10/20  0401  02/11/20  0405 02/11/20  0755 02/11/20  1203     143   < > 145  145 148* 147*   K 3.8  3.8   < > 3.8  3.8 4.8 4.5     103   < > 103  103 107 106   CO2 25  25   < > 30*  30* 31* 29   BUN 49*  49*   < > 49*  49* 48* 50*   CREATININE 1.3  1.3   < > 1.2  1.2 1.3 1.4   CALCIUM 9.6  9.6   < > 10.3  10.3 10.2 10.2   ALBUMIN 2.8*  2.8*  --  3.0*  --   --    PROT 7.9  7.9  --  8.6*  --   --    BILITOT 1.4*  1.4*  --  1.4*  --   --    ALKPHOS 107  107  --  130  --   --    ALT 23  23  --  34  --   --    AST 41*  41*  --  53*  --   --    MG 2.5  2.5   < > 2.6  2.6 2.6 2.7*   PHOS 3.9  --  3.2  --   --     < > = values in this interval not displayed.       ABGs:   Recent Labs   Lab 02/11/20  0820   PH 7.442   PCO2 46.9*   HCO3 32.0*   POCSATURATED 98   BE 8     BMP:   Recent Labs   Lab 02/11/20  1203   GLU 95   *   K 4.5      CO2 29   BUN 50*   CREATININE 1.4   CALCIUM 10.2   MG 2.7*     CMP:   Recent Labs   Lab 02/10/20  0401  02/11/20  0405 02/11/20  0755 02/11/20  1203     143   < > 145  145 148* 147*   K 3.8  3.8   < > 3.8  3.8 4.8 4.5     103   < > 103  103 107 106   CO2 25   25   < > 30*  30* 31* 29   *  133*   < > 135*  135* 137* 95   BUN 49*  49*   < > 49*  49* 48* 50*   CREATININE 1.3  1.3   < > 1.2  1.2 1.3 1.4   CALCIUM 9.6  9.6   < > 10.3  10.3 10.2 10.2   PROT 7.9  7.9  --  8.6*  --   --    ALBUMIN 2.8*  2.8*  --  3.0*  --   --    BILITOT 1.4*  1.4*  --  1.4*  --   --    ALKPHOS 107  107  --  130  --   --    AST 41*  41*  --  53*  --   --    ALT 23  23  --  34  --   --    ANIONGAP 15  15   < > 12  12 10 12   EGFRNONAA >60.0  >60.0   < > >60.0  >60.0 >60.0 56.2*    < > = values in this interval not displayed.     Coagulation:   Recent Labs   Lab 02/11/20  0405   INR 1.2   APTT 49.5*  49.5*     Lactic Acid: No results for input(s): LACTATE in the last 48 hours.  All pertinent labs within the past 24 hours have been reviewed.    Significant Imaging:  I have reviewed all pertinent imaging results/findings within the past 24 hours.     CXR: apical pneumo not well defined today

## 2020-02-11 NOTE — PROGRESS NOTES
Dr. Joshi at bedside. CVP 16 flat. -120 ml/hr. Diuril 250 mg stat ordered. K 4.0, mag 2.7. MD ordered 20 mg KCL IV. Will continue to monitor.

## 2020-02-12 LAB
ALBUMIN SERPL BCP-MCNC: 3 G/DL (ref 3.5–5.2)
ALBUMIN SERPL BCP-MCNC: 3 G/DL (ref 3.5–5.2)
ALLENS TEST: ABNORMAL
ALP SERPL-CCNC: 127 U/L (ref 55–135)
ALP SERPL-CCNC: 127 U/L (ref 55–135)
ALT SERPL W/O P-5'-P-CCNC: 40 U/L (ref 10–44)
ALT SERPL W/O P-5'-P-CCNC: 40 U/L (ref 10–44)
ANION GAP SERPL CALC-SCNC: 12 MMOL/L (ref 8–16)
ANION GAP SERPL CALC-SCNC: 13 MMOL/L (ref 8–16)
APTT BLDCRRT: 27.1 SEC (ref 21–32)
APTT BLDCRRT: 39.5 SEC (ref 21–32)
APTT BLDCRRT: 47.3 SEC (ref 21–32)
APTT BLDCRRT: 47.3 SEC (ref 21–32)
APTT BLDCRRT: 52.6 SEC (ref 21–32)
AST SERPL-CCNC: 58 U/L (ref 10–40)
AST SERPL-CCNC: 58 U/L (ref 10–40)
BASOPHILS # BLD AUTO: 0.09 K/UL (ref 0–0.2)
BASOPHILS NFR BLD: 0.5 % (ref 0–1.9)
BILIRUB DIRECT SERPL-MCNC: 0.9 MG/DL (ref 0.1–0.3)
BILIRUB DIRECT SERPL-MCNC: 0.9 MG/DL (ref 0.1–0.3)
BILIRUB SERPL-MCNC: 1.1 MG/DL (ref 0.1–1)
BILIRUB SERPL-MCNC: 1.1 MG/DL (ref 0.1–1)
BNP SERPL-MCNC: 1356 PG/ML (ref 0–99)
BSA FOR ECHO PROCEDURE: 2.14 M2
BUN SERPL-MCNC: 50 MG/DL (ref 6–20)
BUN SERPL-MCNC: 52 MG/DL (ref 6–20)
BUN SERPL-MCNC: 52 MG/DL (ref 6–20)
BUN SERPL-MCNC: 54 MG/DL (ref 6–20)
BUN SERPL-MCNC: 55 MG/DL (ref 6–20)
BUN SERPL-MCNC: 58 MG/DL (ref 6–20)
CALCIUM SERPL-MCNC: 10 MG/DL (ref 8.7–10.5)
CALCIUM SERPL-MCNC: 10.1 MG/DL (ref 8.7–10.5)
CALCIUM SERPL-MCNC: 10.2 MG/DL (ref 8.7–10.5)
CALCIUM SERPL-MCNC: 10.2 MG/DL (ref 8.7–10.5)
CALCIUM SERPL-MCNC: 10.3 MG/DL (ref 8.7–10.5)
CALCIUM SERPL-MCNC: 9.8 MG/DL (ref 8.7–10.5)
CHLORIDE SERPL-SCNC: 102 MMOL/L (ref 95–110)
CHLORIDE SERPL-SCNC: 104 MMOL/L (ref 95–110)
CHLORIDE SERPL-SCNC: 104 MMOL/L (ref 95–110)
CHLORIDE SERPL-SCNC: 105 MMOL/L (ref 95–110)
CO2 SERPL-SCNC: 30 MMOL/L (ref 23–29)
CREAT SERPL-MCNC: 1.3 MG/DL (ref 0.5–1.4)
CREAT SERPL-MCNC: 1.5 MG/DL (ref 0.5–1.4)
CREAT SERPL-MCNC: 1.6 MG/DL (ref 0.5–1.4)
CREAT SERPL-MCNC: 1.6 MG/DL (ref 0.5–1.4)
CRP SERPL-MCNC: 167.1 MG/L (ref 0–8.2)
CV ECHO LV RWT: 0.21 CM
DELSYS: ABNORMAL
DIFFERENTIAL METHOD: ABNORMAL
DOP CALC LVOT AREA: 3.3 CM2
DOP CALC LVOT DIAMETER: 2.06 CM
E WAVE DECELERATION TIME: 184.67 MSEC
E/A RATIO: 0.99
E/E' RATIO: 9.88 M/S
ECHO LV POSTERIOR WALL: 0.64 CM (ref 0.6–1.1)
EOSINOPHIL # BLD AUTO: 0.4 K/UL (ref 0–0.5)
EOSINOPHIL NFR BLD: 2.3 % (ref 0–8)
ERYTHROCYTE [DISTWIDTH] IN BLOOD BY AUTOMATED COUNT: 18.7 % (ref 11.5–14.5)
EST. GFR  (AFRICAN AMERICAN): 55.2 ML/MIN/1.73 M^2
EST. GFR  (AFRICAN AMERICAN): 55.2 ML/MIN/1.73 M^2
EST. GFR  (AFRICAN AMERICAN): 59.7 ML/MIN/1.73 M^2
EST. GFR  (AFRICAN AMERICAN): >60 ML/MIN/1.73 M^2
EST. GFR  (NON AFRICAN AMERICAN): 47.8 ML/MIN/1.73 M^2
EST. GFR  (NON AFRICAN AMERICAN): 47.8 ML/MIN/1.73 M^2
EST. GFR  (NON AFRICAN AMERICAN): 51.7 ML/MIN/1.73 M^2
EST. GFR  (NON AFRICAN AMERICAN): >60 ML/MIN/1.73 M^2
FLOW: 4
FRACTIONAL SHORTENING: 14 % (ref 28–44)
GLUCOSE SERPL-MCNC: 115 MG/DL (ref 70–110)
GLUCOSE SERPL-MCNC: 119 MG/DL (ref 70–110)
GLUCOSE SERPL-MCNC: 120 MG/DL (ref 70–110)
GLUCOSE SERPL-MCNC: 120 MG/DL (ref 70–110)
GLUCOSE SERPL-MCNC: 132 MG/DL (ref 70–110)
GLUCOSE SERPL-MCNC: 135 MG/DL (ref 70–110)
HCO3 UR-SCNC: 30.1 MMOL/L (ref 24–28)
HCT VFR BLD AUTO: 29.2 % (ref 40–54)
HGB BLD-MCNC: 9.1 G/DL (ref 14–18)
IMM GRANULOCYTES # BLD AUTO: 0.48 K/UL (ref 0–0.04)
IMM GRANULOCYTES NFR BLD AUTO: 2.7 % (ref 0–0.5)
INR PPP: 1.2 (ref 0.8–1.2)
INR PPP: 1.2 (ref 0.8–1.2)
INTERVENTRICULAR SEPTUM: 0.65 CM (ref 0.6–1.1)
LA MAJOR: 5.4 CM
LA MINOR: 5.4 CM
LA WIDTH: 4 CM
LDH SERPL L TO P-CCNC: 0.88 MMOL/L (ref 0.36–1.25)
LDH SERPL L TO P-CCNC: 427 U/L (ref 110–260)
LEFT ATRIUM SIZE: 4 CM
LEFT ATRIUM VOLUME INDEX: 35.4 ML/M2
LEFT ATRIUM VOLUME: 73.44 CM3
LEFT INTERNAL DIMENSION IN SYSTOLE: 5.32 CM (ref 2.1–4)
LEFT VENTRICLE DIASTOLIC VOLUME INDEX: 92.93 ML/M2
LEFT VENTRICLE DIASTOLIC VOLUME: 193.01 ML
LEFT VENTRICLE MASS INDEX: 73 G/M2
LEFT VENTRICLE SYSTOLIC VOLUME INDEX: 65.7 ML/M2
LEFT VENTRICLE SYSTOLIC VOLUME: 136.37 ML
LEFT VENTRICULAR INTERNAL DIMENSION IN DIASTOLE: 6.19 CM (ref 3.5–6)
LEFT VENTRICULAR MASS: 151.47 G
LV LATERAL E/E' RATIO: 8.4 M/S
LV SEPTAL E/E' RATIO: 12 M/S
LYMPHOCYTES # BLD AUTO: 1.6 K/UL (ref 1–4.8)
LYMPHOCYTES NFR BLD: 8.8 % (ref 18–48)
MAGNESIUM SERPL-MCNC: 2.6 MG/DL (ref 1.6–2.6)
MAGNESIUM SERPL-MCNC: 2.6 MG/DL (ref 1.6–2.6)
MAGNESIUM SERPL-MCNC: 2.7 MG/DL (ref 1.6–2.6)
MAGNESIUM SERPL-MCNC: 2.8 MG/DL (ref 1.6–2.6)
MCH RBC QN AUTO: 25.9 PG (ref 27–31)
MCHC RBC AUTO-ENTMCNC: 31.2 G/DL (ref 32–36)
MCV RBC AUTO: 83 FL (ref 82–98)
METHEMOGLOBIN: 0.9 % (ref 0–3)
MODE: ABNORMAL
MONOCYTES # BLD AUTO: 1.6 K/UL (ref 0.3–1)
MONOCYTES NFR BLD: 8.7 % (ref 4–15)
MV PEAK A VEL: 0.85 M/S
MV PEAK E VEL: 0.84 M/S
NEUTROPHILS # BLD AUTO: 13.9 K/UL (ref 1.8–7.7)
NEUTROPHILS NFR BLD: 77 % (ref 38–73)
NRBC BLD-RTO: 0 /100 WBC
PCO2 BLDA: 38.1 MMHG (ref 35–45)
PH SMN: 7.5 [PH] (ref 7.35–7.45)
PHOSPHATE SERPL-MCNC: 3.6 MG/DL (ref 2.7–4.5)
PHOSPHATE SERPL-MCNC: 3.8 MG/DL (ref 2.7–4.5)
PISA TR MAX VEL: 2.14 M/S
PLATELET # BLD AUTO: 319 K/UL (ref 150–350)
PMV BLD AUTO: 9.4 FL (ref 9.2–12.9)
PO2 BLDA: 154 MMHG (ref 80–100)
POC BE: 7 MMOL/L
POC SATURATED O2: 100 % (ref 95–100)
POC TCO2: 31 MMOL/L (ref 23–27)
POCT GLUCOSE: 108 MG/DL (ref 70–110)
POCT GLUCOSE: 114 MG/DL (ref 70–110)
POCT GLUCOSE: 120 MG/DL (ref 70–110)
POCT GLUCOSE: 126 MG/DL (ref 70–110)
POCT GLUCOSE: 142 MG/DL (ref 70–110)
POTASSIUM SERPL-SCNC: 3.8 MMOL/L (ref 3.5–5.1)
POTASSIUM SERPL-SCNC: 4.2 MMOL/L (ref 3.5–5.1)
POTASSIUM SERPL-SCNC: 4.2 MMOL/L (ref 3.5–5.1)
POTASSIUM SERPL-SCNC: 4.3 MMOL/L (ref 3.5–5.1)
POTASSIUM SERPL-SCNC: 4.5 MMOL/L (ref 3.5–5.1)
POTASSIUM SERPL-SCNC: 4.5 MMOL/L (ref 3.5–5.1)
PROT SERPL-MCNC: 8.5 G/DL (ref 6–8.4)
PROT SERPL-MCNC: 8.5 G/DL (ref 6–8.4)
PROTHROMBIN TIME: 11.8 SEC (ref 9–12.5)
PROTHROMBIN TIME: 11.8 SEC (ref 9–12.5)
RBC # BLD AUTO: 3.51 M/UL (ref 4.6–6.2)
SAMPLE: ABNORMAL
SINUS: 3.49 CM
SITE: ABNORMAL
SODIUM SERPL-SCNC: 144 MMOL/L (ref 136–145)
SODIUM SERPL-SCNC: 146 MMOL/L (ref 136–145)
SODIUM SERPL-SCNC: 147 MMOL/L (ref 136–145)
STJ: 3.36 CM
TDI LATERAL: 0.1 M/S
TDI SEPTAL: 0.07 M/S
TDI: 0.09 M/S
TR MAX PG: 18 MMHG
TRICUSPID ANNULAR PLANE SYSTOLIC EXCURSION: 0.81 CM
WBC # BLD AUTO: 18.11 K/UL (ref 3.9–12.7)

## 2020-02-12 PROCEDURE — 63600175 PHARM REV CODE 636 W HCPCS: Performed by: STUDENT IN AN ORGANIZED HEALTH CARE EDUCATION/TRAINING PROGRAM

## 2020-02-12 PROCEDURE — 99232 PR SUBSEQUENT HOSPITAL CARE,LEVL II: ICD-10-PCS | Mod: ,,, | Performed by: INTERNAL MEDICINE

## 2020-02-12 PROCEDURE — 85025 COMPLETE CBC W/AUTO DIFF WBC: CPT

## 2020-02-12 PROCEDURE — 99233 PR SUBSEQUENT HOSPITAL CARE,LEVL III: ICD-10-PCS | Mod: GC,,, | Performed by: SURGERY

## 2020-02-12 PROCEDURE — 93750 INTERROGATION VAD IN PERSON: CPT | Mod: ,,, | Performed by: INTERNAL MEDICINE

## 2020-02-12 PROCEDURE — 82800 BLOOD PH: CPT

## 2020-02-12 PROCEDURE — 97116 GAIT TRAINING THERAPY: CPT

## 2020-02-12 PROCEDURE — 80048 BASIC METABOLIC PNL TOTAL CA: CPT | Mod: 91

## 2020-02-12 PROCEDURE — 84100 ASSAY OF PHOSPHORUS: CPT | Mod: 91

## 2020-02-12 PROCEDURE — A4216 STERILE WATER/SALINE, 10 ML: HCPCS | Performed by: THORACIC SURGERY (CARDIOTHORACIC VASCULAR SURGERY)

## 2020-02-12 PROCEDURE — 27000221 HC OXYGEN, UP TO 24 HOURS

## 2020-02-12 PROCEDURE — 25000003 PHARM REV CODE 250: Performed by: THORACIC SURGERY (CARDIOTHORACIC VASCULAR SURGERY)

## 2020-02-12 PROCEDURE — 99900035 HC TECH TIME PER 15 MIN (STAT)

## 2020-02-12 PROCEDURE — 80076 HEPATIC FUNCTION PANEL: CPT

## 2020-02-12 PROCEDURE — 20000000 HC ICU ROOM

## 2020-02-12 PROCEDURE — 83735 ASSAY OF MAGNESIUM: CPT

## 2020-02-12 PROCEDURE — 86140 C-REACTIVE PROTEIN: CPT

## 2020-02-12 PROCEDURE — 83605 ASSAY OF LACTIC ACID: CPT

## 2020-02-12 PROCEDURE — 85610 PROTHROMBIN TIME: CPT

## 2020-02-12 PROCEDURE — 94761 N-INVAS EAR/PLS OXIMETRY MLT: CPT

## 2020-02-12 PROCEDURE — 85730 THROMBOPLASTIN TIME PARTIAL: CPT | Mod: 91

## 2020-02-12 PROCEDURE — 83615 LACTATE (LD) (LDH) ENZYME: CPT

## 2020-02-12 PROCEDURE — 25000003 PHARM REV CODE 250: Performed by: STUDENT IN AN ORGANIZED HEALTH CARE EDUCATION/TRAINING PROGRAM

## 2020-02-12 PROCEDURE — 80048 BASIC METABOLIC PNL TOTAL CA: CPT

## 2020-02-12 PROCEDURE — 94664 DEMO&/EVAL PT USE INHALER: CPT

## 2020-02-12 PROCEDURE — 27000248 HC VAD-ADDITIONAL DAY

## 2020-02-12 PROCEDURE — C9113 INJ PANTOPRAZOLE SODIUM, VIA: HCPCS | Performed by: STUDENT IN AN ORGANIZED HEALTH CARE EDUCATION/TRAINING PROGRAM

## 2020-02-12 PROCEDURE — 97110 THERAPEUTIC EXERCISES: CPT

## 2020-02-12 PROCEDURE — 99231 SBSQ HOSP IP/OBS SF/LOW 25: CPT | Mod: ,,, | Performed by: NURSE PRACTITIONER

## 2020-02-12 PROCEDURE — 85730 THROMBOPLASTIN TIME PARTIAL: CPT

## 2020-02-12 PROCEDURE — 83735 ASSAY OF MAGNESIUM: CPT | Mod: 91

## 2020-02-12 PROCEDURE — 84100 ASSAY OF PHOSPHORUS: CPT

## 2020-02-12 PROCEDURE — 82803 BLOOD GASES ANY COMBINATION: CPT

## 2020-02-12 PROCEDURE — 93750 PR INTERROGATE VENT ASSIST DEV, IN PERSON, W PHYSICIAN ANALYSIS: ICD-10-PCS | Mod: ,,, | Performed by: INTERNAL MEDICINE

## 2020-02-12 PROCEDURE — 92526 ORAL FUNCTION THERAPY: CPT

## 2020-02-12 PROCEDURE — 99233 SBSQ HOSP IP/OBS HIGH 50: CPT | Mod: GC,,, | Performed by: SURGERY

## 2020-02-12 PROCEDURE — 84132 ASSAY OF SERUM POTASSIUM: CPT

## 2020-02-12 PROCEDURE — 37799 UNLISTED PX VASCULAR SURGERY: CPT

## 2020-02-12 PROCEDURE — 83880 ASSAY OF NATRIURETIC PEPTIDE: CPT

## 2020-02-12 PROCEDURE — 97535 SELF CARE MNGMENT TRAINING: CPT

## 2020-02-12 PROCEDURE — 99232 SBSQ HOSP IP/OBS MODERATE 35: CPT | Mod: ,,, | Performed by: INTERNAL MEDICINE

## 2020-02-12 PROCEDURE — 63600367 HC NITRIC OXIDE PER HOUR

## 2020-02-12 PROCEDURE — 99231 PR SUBSEQUENT HOSPITAL CARE,LEVL I: ICD-10-PCS | Mod: ,,, | Performed by: NURSE PRACTITIONER

## 2020-02-12 PROCEDURE — 25000003 PHARM REV CODE 250: Performed by: PHYSICIAN ASSISTANT

## 2020-02-12 PROCEDURE — 85610 PROTHROMBIN TIME: CPT | Mod: 91

## 2020-02-12 PROCEDURE — 63600175 PHARM REV CODE 636 W HCPCS: Performed by: SURGERY

## 2020-02-12 PROCEDURE — 83050 HGB METHEMOGLOBIN QUAN: CPT

## 2020-02-12 RX ORDER — BISACODYL 10 MG
10 SUPPOSITORY, RECTAL RECTAL ONCE
Status: DISCONTINUED | OUTPATIENT
Start: 2020-02-12 | End: 2020-02-13

## 2020-02-12 RX ORDER — HEPARIN SODIUM 10000 [USP'U]/100ML
1200 INJECTION, SOLUTION INTRAVENOUS CONTINUOUS
Status: DISCONTINUED | OUTPATIENT
Start: 2020-02-12 | End: 2020-02-16

## 2020-02-12 RX ORDER — POLYETHYLENE GLYCOL 3350, SODIUM SULFATE ANHYDROUS, SODIUM BICARBONATE, SODIUM CHLORIDE, POTASSIUM CHLORIDE 236; 22.74; 6.74; 5.86; 2.97 G/4L; G/4L; G/4L; G/4L; G/4L
4000 POWDER, FOR SOLUTION ORAL ONCE
Status: COMPLETED | OUTPATIENT
Start: 2020-02-12 | End: 2020-02-12

## 2020-02-12 RX ORDER — WARFARIN 4 MG/1
4 TABLET ORAL ONCE
Status: COMPLETED | OUTPATIENT
Start: 2020-02-12 | End: 2020-02-12

## 2020-02-12 RX ADMIN — Medication 10 ML: at 11:02

## 2020-02-12 RX ADMIN — POLYETHYLENE GLYCOL 3350, SODIUM SULFATE ANHYDROUS, SODIUM BICARBONATE, SODIUM CHLORIDE, POTASSIUM CHLORIDE 4000 ML: 236; 22.74; 6.74; 5.86; 2.97 POWDER, FOR SOLUTION ORAL at 10:02

## 2020-02-12 RX ADMIN — PANTOPRAZOLE SODIUM 40 MG: 40 INJECTION, POWDER, FOR SOLUTION INTRAVENOUS at 08:02

## 2020-02-12 RX ADMIN — HYDRALAZINE HYDROCHLORIDE 10 MG: 20 INJECTION INTRAMUSCULAR; INTRAVENOUS at 09:02

## 2020-02-12 RX ADMIN — POTASSIUM CHLORIDE 40 MEQ: 20 SOLUTION ORAL at 04:02

## 2020-02-12 RX ADMIN — OXYCODONE HYDROCHLORIDE 10 MG: 10 TABLET ORAL at 02:02

## 2020-02-12 RX ADMIN — HYDRALAZINE HYDROCHLORIDE 10 MG: 20 INJECTION INTRAMUSCULAR; INTRAVENOUS at 05:02

## 2020-02-12 RX ADMIN — OXYCODONE HYDROCHLORIDE 10 MG: 10 TABLET ORAL at 08:02

## 2020-02-12 RX ADMIN — MUPIROCIN: 20 OINTMENT TOPICAL at 08:02

## 2020-02-12 RX ADMIN — DOBUTAMINE IN DEXTROSE 5 MCG/KG/MIN: 200 INJECTION, SOLUTION INTRAVENOUS at 05:02

## 2020-02-12 RX ADMIN — POLYETHYLENE GLYCOL 3350 17 G: 17 POWDER, FOR SOLUTION ORAL at 08:02

## 2020-02-12 RX ADMIN — CHLOROTHIAZIDE SODIUM 250 MG: 500 INJECTION, POWDER, LYOPHILIZED, FOR SOLUTION INTRAVENOUS at 09:02

## 2020-02-12 RX ADMIN — HYDRALAZINE HYDROCHLORIDE 10 MG: 20 INJECTION INTRAMUSCULAR; INTRAVENOUS at 02:02

## 2020-02-12 RX ADMIN — Medication 10 ML: at 06:02

## 2020-02-12 RX ADMIN — POTASSIUM CHLORIDE 20 MEQ: 20 SOLUTION ORAL at 09:02

## 2020-02-12 RX ADMIN — POTASSIUM CHLORIDE 20 MEQ: 20 SOLUTION ORAL at 08:02

## 2020-02-12 RX ADMIN — Medication 10 ML: at 05:02

## 2020-02-12 RX ADMIN — FUROSEMIDE 15 MG/HR: 10 INJECTION, SOLUTION INTRAMUSCULAR; INTRAVENOUS at 10:02

## 2020-02-12 RX ADMIN — ASPIRIN 325 MG ORAL TABLET 325 MG: 325 PILL ORAL at 08:02

## 2020-02-12 RX ADMIN — DOBUTAMINE IN DEXTROSE 5 MCG/KG/MIN: 200 INJECTION, SOLUTION INTRAVENOUS at 02:02

## 2020-02-12 RX ADMIN — OXYCODONE HYDROCHLORIDE 10 MG: 10 TABLET ORAL at 11:02

## 2020-02-12 RX ADMIN — HEPARIN SODIUM AND DEXTROSE 1100 UNITS/HR: 10000; 5 INJECTION INTRAVENOUS at 05:02

## 2020-02-12 RX ADMIN — WARFARIN SODIUM 4 MG: 4 TABLET ORAL at 04:02

## 2020-02-12 RX ADMIN — AMLODIPINE BESYLATE 10 MG: 10 TABLET ORAL at 08:02

## 2020-02-12 NOTE — PT/OT/SLP PROGRESS
"Speech Language Pathology Treatment    Patient Name:  Saba Lott   MRN:  [id number]  Admitting Diagnosis: Presence of left ventricular assist device (LVAD)    Recommendations:                 General Recommendations:  Dysphagia therapy  Diet recommendations:  Dental Soft, Liquid Diet Level: Nectar Thick   To achieve nectar-thickened liquids, please use 6 oz of any liquid to 1 packet of thickener. Note: No jello, ice cream or ice.   Aspiration Precautions:   - 1:1 supervision with all PO advised  - HOB to 90 degrees  - Avoid talking while eating and Eliminate distractions  - Feed only when awake/alert  - small, single bites/sips. NO STRAWS  - Frequent oral care  - Remain upright 30 minutes post meal  - Meds crushed or buried whole in puree per Pt preference  - Strict aspiration precautions and monitor for s/s of aspiration and discontinue oral feeding should you notice any of the following: watery eyes, reddened facial area, wet vocal quality, increased work of breathing, change in respiratory status, increased congestion, coughing, fever and/or mental status change.   General Precautions: Standard, aspiration, fall, LVAD, sternal  Communication strategies:  go to room if call light pushed    Subjective     SLP reviewed Pt with RN, RN cleared for PO trials  Pt presents calm, cooperative  He explains, "I had ice this morning, nothing yesterday    Pain/Comfort:  · Pain Rating 1: 8/10  · Location - Orientation 1: midline  · Location 1: chest  · Pain Addressed 1: Reposition, Pre-medicate for activity, Nurse notified  · Pain Rating Post-Intervention 1: 8/10    Objective:     Has the patient been evaluated by SLP for swallowing?   Yes  Keep patient NPO? No   Current Respiratory Status: nasal cannula      Pt seen for ongoing swallow assessment. He was found awake and alert in chair in room feeding himself ice chips from cup, with arterial line, central line, PICC, Chest Tube (R), nasal canula, NG, and LVAD in place. Call " light within reach. He was alert, attentive and oriented x4. He followed simple commands without need for redirection. He demonstrated a mildly hoarse vocal quality with mildly decreased vocal intensity which was notably improved from day prior.  He accepted trials of thin liquids (ice chips x2, cup edge sips x2), nectar-thickened liquids (cup edge sips x5), puree (tsp bites x8) and solid (bites of nusrat cracker x2.) Pt with delayed, wet change in vocal quality following cup edge sip thin liquids with eventual throat clears with cup edge sips thin liquids. Pt with immediate throat clear on larger cup edge sip nectar-thickened liquids x1.  No overt S/S aspiration with presentations of puree or solids. While Pt accepted trials of solids, he  endorsed he preferred softer textures. SLP educated Pt on diet types, findings and diet recommendations, thickener guidelines for nectar-thickened liquids, safe swallow strategies with emphasis on slow pace of bites/sips and small size of bites/sips and aspiration precautions. He verbalized understanding yet required hand-over-hand assistance to demonstrate single sips from open cup following review. RN in room at end of session and notified of findings/recs.  Thickener packets, measuring cups and spoons left in room. Whiteboard updated. MD team notified following session.     Assessment:     Saba Lott is a 54 y.o. male with an SLP diagnosis of Dysphagia and Dysphonia.  He presents with improved vocal quality this service day. ST to continue to follow to monitor tolerance of diet and assess safety/feasibility of PO advancement.     Goals:   Multidisciplinary Problems     SLP Goals        Problem: SLP Goal    Goal Priority Disciplines Outcome   SLP Goal     SLP Ongoing, Progressing   Description:  Speech Language Pathology Goals  Goals expected to be met by 2/18/2020  1. Pt will participate in ongoing swallow assessment to determine safest, least restrictive diet  2. Educate Pt  and family on S/S aspiration and aspiration precautions                 Multidisciplinary Problems (Resolved)        Problem: SLP Goal    Goal Priority Disciplines Outcome   SLP Goal   (Resolved)     SLP Met                 Plan:     · Patient to be seen:  5 x/week   · Plan of Care expires:  03/12/20  · Plan of Care reviewed with:  patient   · SLP Follow-Up:  Yes       Discharge recommendations:  (pending progress and per PT/OT recs)     Time Tracking:     SLP Treatment Date:   02/12/20  Speech Start Time:  1005  Speech Stop Time:  1030     Speech Total Time (min):  25 min    Billable Minutes: Treatment Swallowing Dysfunction 10 and Self Care/Home Management Training 15    LARISSA Doyle., [facility]-SLP  Speech-Language Pathology  Pager: [phone]    02/12/2020

## 2020-02-12 NOTE — PLAN OF CARE
Problem: SLP Goal  Goal: SLP Goal  Description  Speech Language Pathology Goals  Goals expected to be met by 2/18/2020  1. Pt will participate in ongoing swallow assessment to determine safest, least restrictive diet  2. Educate Pt and family on S/S aspiration and aspiration precautions      Outcome: Ongoing, Progressing     Pt seen for ongoing swallow assessment. Pt with improved vocal quality.  REC: Level VI dental soft diet with NECTAR-thickened liquids, medications buried whole or crushed in puree, provided 1:1 supervision, strict aspiration precautions, small bites/sips (no straws) and ongoing monitoring for S/S aspiration.  Please continue to monitor for signs and symptoms of aspiration and discontinue oral feeding should you notice any of the following: watery eyes, reddened facial area, wet vocal quality, increased work of breathing, change in respiratory status, increased congestion, coughing, fever or change in mental status.  ST to continue to follow. Findings reviewed with Pt and RN. MD team paged following assessment.     LARISSA Doyle., [facility]-SLP  Speech-Language Pathology  Pager: [phone]  2/12/2020

## 2020-02-12 NOTE — PLAN OF CARE
Continue per OT POC, all goals remain appropriate.    Problem: Occupational Therapy Goal  Goal: Occupational Therapy Goal  Description  Goals to be met by: 2/24/20     Patient will increase functional independence with ADLs by performing:    Feeding with [place].  UE Dressing with Supervision.  LE Dressing with Supervision.  Grooming while standing at sink with Supervision.  Toileting from toilet with Supervision for hygiene and clothing management.   Toilet transfer to toilet with Supervision.  Pt will be (I) with VAD management during ADL.    Outcome: Ongoing, Progressing

## 2020-02-12 NOTE — PLAN OF CARE
"      SICU PLAN OF CARE NOTE    Dx: Presence of left ventricular assist device (LVAD)    Shift Events: Patient updated on plan of care. No acute events during shift. All VSS. LVAD numbers WNL. Patient had bowel movement today. OOB to chair for 4 hours. All questions and concerns acknowledged and answered. See flow sheets for full assessment details. Will continue to monitor patient closely.     Goals of Care: wean Epi and nitric; PT/OT    Neuro: AAO x4, Follows Commands and Moves All Extremities    Vital Signs: BP (!) 94/0 (BP Location: Left arm, Patient Position: Lying)   Pulse 110   Temp 97.5 °F (36.4 °C) (Oral)   Resp (!) 22   Ht 5' 7" (1.702 m)   Wt 96.6 kg (213 lb)   SpO2 99%   BMI 33.36 kg/m²  CVP 14-17    Respiratory: Nasal Cannula 3L 5ppm NO    Diet: Dental Soft- patient advanced today by speech.    Gtts: Epinephrine, Dobutamine, Lasix and Heparin    Urine Output: Urinary Catheter placed at 1620 due to retention issues.     Drains: q4 hours free water boluses (250ml)    LVAD:  HM 3  Speed 5200  LSL 4800  Flows: 4.3-4.8  PI 3.3-5.1  Power 3.8-3.9    Labs/Accuchecks: q6 hour pTT, BMP, Mag    Skin: LVAD driveline site oozing. Dr. Joshi notified and aware. LVAD dressing completed using sterile technique. Midline incision with preveena in place. No skin breakdown to elbows, heels, or sacrum.        "

## 2020-02-12 NOTE — ASSESSMENT & PLAN NOTE
SIGN OFF  BG is within or below goal without use of insulin therapy. Patient is also tolerating vasopressor therapy without BG excursions.     Discontinue BG monitoring. Will sign off. Please re-consult Endo as needed.    Patient has no Dx of DM, and is tolerating PO intake without BG excursions and/or complications.     Thank you for the consult.     ** Please call Endocrine for any BG related issues **

## 2020-02-12 NOTE — ASSESSMENT & PLAN NOTE
Neuro:   -Pain control: prn dilaudid and PO oxy  -No sedation    Pulmonary:   -extubated on 3L NC, continue to wean as tolerated  -Radha to 5  -lasix gtt at 15  -scheduled diuril 250 daily  -CPT q6hrs    Cardiac:  -MAP goal >65  -Epi 0.02 wean based on CVPs,  5  -amlodipine 10  -LVAD speed 5200, flows ~4.6  -LDH trending down  -CT pulled today. F/u post pull CXR  -PICC in place    Renal:   - Mc d/c 2/11  -UOP adequate  -Bun/Cr 52/1.5  -lasix gtt at 15    Fluids/Electrolytes/Nutrition/GI:   -Nutritional status: NPO  -bar Mg, K  -NGT  -TF - holding until patient has BM  -SLP c/s  -250 free water flushes q4hrs  -bowel regimen: Miralax and docusate - will scheduled suppository today  -Golytely: 100 q4hrs    Hematology/Oncology:  -H/H stable 9.1/29.2 (from 9.5/30.3)  -INR/Plts 1.2/319  -Trend CBC  -coumadin 4 at 5 pm   -hep 1100 goal 45 - 54    Infectious Disease:   -Afebrile  -WBC 18.11  -Abx: completed course of vanc and ancef    Endocrine:  -Glucose goal of 120-180  -Insulin gtt per endo    Dispo:  -Continue care in the ICU setting. Wean epi. OOBTC, PT/OT/SLP. Continue bowel regimen

## 2020-02-12 NOTE — ASSESSMENT & PLAN NOTE
-HeartMate 3 Implanted 02/06/2020 as DT. Chest closed 2/7.   -CTS Primary.  -Implanted by Dr. Joshi.  -INR sub therapeutic Coumadin, Goal INR 2.0-3.0. Anticoag per CTS.   -Antiplatelets  mg.  -LDH is stable overall today. Will continue to monitor daily.  -Continues on Epi, , Cardene, Radha.  -CVP elevated. Continue aggressive diuresis.   -Speed set at 5200, LSL 4800 rpm.  -Not listed for OHTx.  Procedure: Device Interrogation Including analysis of device parameters.  Current Settings: Ventricular Assist Device.  Review of device function is stable/unstable stable.    TXP LVAD INTERROGATIONS 2/12/2020 2/12/2020 2/12/2020 2/12/2020 2/12/2020 2/12/2020 2/12/2020   Type HeartMate3 HeartMate3 HeartMate3 HeartMate3 HeartMate3 HeartMate3 HeartMate3   Flow 4.4 4.4 4.5 4.4 4.3 4.2 4.3   Speed 5200 5200 5200 5200 5200 5200 5200   PI 4.2 5.0 4.8 5.1 4.8 4.6 4.0   Power ([place]) 3.8 3.9 3.8 3.9 3.8 3.8 3.9   LSL 4800 4800 4800 4800 4800 4800 4800   Pulsatility Pulse Pulse Pulse Pulse Pulse Pulse Pulse

## 2020-02-12 NOTE — SUBJECTIVE & OBJECTIVE
Interval History: diet advanced per speech to dental soft      Continuous Infusions:   sodium chloride 0.9% 10 mL/hr at 02/07/20 1632    dexmedetomidine (PRECEDEX) infusion Stopped (02/10/20 0800)    dextrose 5 % and 0.45 % NaCl with KCl 40 mEq 10 mL/hr at 02/07/20 1632    DOBUTamine 5 mcg/kg/min (02/12/20 1400)    epinephrine 0.02 mcg/kg/min (02/12/20 1400)    furosemide (LASIX) 2 mg/mL continuous infusion (non-titrating) 15 mg/hr (02/12/20 1400)    heparin (porcine) in 5 % dex 1,100 Units/hr (02/12/20 1400)    nicardipine 2.5 mg/hr (02/11/20 0600)    nitric oxide gas       Scheduled Meds:   amLODIPine  10 mg Oral Daily    aspirin  325 mg Per NG tube Daily    bisacodyL  10 mg Rectal Once    chlorothiazide (DIURIL) IVPB  250 mg Intravenous Q24H    docusate sodium  100 mg Oral BID    ferrous gluconate  324 mg Oral Daily with breakfast    magnesium oxide  800 mg Oral TID    pantoprazole  40 mg Intravenous Daily    polyethylene glycol  17 g Oral Daily    potassium chloride 10%  20 mEq Per NG tube BID    sodium chloride 0.9%  10 mL Intravenous Q6H    warfarin  4 mg Oral Once     PRN Meds:albumin human 5%, albuterol sulfate, bisacodyL, Dextrose 10% Bolus, Dextrose 10% Bolus, Dextrose 10% Bolus, Dextrose 10% Bolus, Dextrose 10% Bolus, diphenhydrAMINE, glucagon (human recombinant), hydrALAZINE, HYDROmorphone, insulin aspart U-100, magnesium hydroxide 400 mg/5 ml, magnesium sulfate IVPB, oxyCODONE, oxyCODONE, potassium chloride 10%, potassium chloride 10%, potassium chloride 10%, potassium chloride 10%, sodium chloride 0.9%, Flushing PICC Protocol **AND** sodium chloride 0.9% **AND** sodium chloride 0.9%    Review of patient's allergies indicates:   Allergen Reactions    Iodine and iodide containing products      Objective:     Vital Signs (Most Recent):  Temp: 97.5 °F (36.4 °C) (02/12/20 1100)  Pulse: 109 (02/12/20 1415)  Resp: (!) 22 (02/11/20 1700)  BP: (!) 103/90 (02/12/20 1315)  SpO2: 99 %  (02/12/20 1118) Vital Signs (24h Range):  Temp:  [97.5 °F (36.4 °C)-98.9 °F (37.2 °C)] 97.5 °F (36.4 °C)  Pulse:  [] 109  Resp:  [20-22] 22  SpO2:  [99 %] 99 %  BP: ()/(0-90) 103/90  Arterial Line BP: ()/() 100/82     Patient Vitals for the past 72 hrs (Last 3 readings):   Weight   02/12/20 1315 96.6 kg (213 lb)   02/12/20 0505 97 kg (213 lb 13.5 oz)   02/11/20 0500 104 kg (229 lb 4.5 oz)     Body mass index is 33.36 kg/m².      Intake/Output Summary (Last 24 hours) at 2/12/2020 1426  Last data filed at 2/12/2020 1400  Gross per 24 hour   Intake 2147 ml   Output 1100 ml   Net 1047 ml       Hemodynamic Parameters:           Physical Exam   Constitutional: He is oriented to person, place, and time. He appears well-developed and well-nourished.   HENT:   Head: Normocephalic and atraumatic.   Cardiovascular: Normal rate and regular rhythm.   LVAD in place  Introducer in place   Pulmonary/Chest: Effort normal. No respiratory distress.   NC   Abdominal: Soft.   Genitourinary:   Genitourinary Comments: Mc cath in place   Neurological: He is alert and oriented to person, place, and time.   Skin: Skin is warm and dry.   Psychiatric: He has a normal mood and affect. His behavior is normal.   Nursing note and vitals reviewed.      Significant Labs:  CBC:  Recent Labs   Lab 02/10/20  1615 02/11/20  0404 02/12/20  0400   WBC 19.67* 20.42* 18.11*   RBC 3.66* 3.63* 3.51*   HGB 9.5* 9.5* 9.1*   HCT 30.9* 30.3* 29.2*    279 319   MCV 84 84 83   MCH 26.0* 26.2* 25.9*   MCHC 30.7* 31.4* 31.2*     BNP:  Recent Labs   Lab 02/06/20  0334 02/10/20  0401 02/12/20  0400   BNP 1,420* 887* 1,356*     CMP:  Recent Labs   Lab 02/10/20  0401 02/11/20  0405  02/11/20 2023 02/12/20  0004 02/12/20 0400   *  133*   < > 135*  135*   < > 126* 115* 120*  120*   CALCIUM 9.6  9.6   < > 10.3  10.3   < > 10.4 10.1 10.2  10.2   ALBUMIN 2.8*  2.8*  --  3.0*  --   --   --  3.0*  3.0*   PROT 7.9  7.9  --   8.6*  --   --   --  8.5*  8.5*     143   < > 145  145   < > 147* 146* 147*  147*   K 3.8  3.8   < > 3.8  3.8   < > 4.0 4.3 4.2  4.2   CO2 25  25   < > 30*  30*   < > 29 30* 30*  30*     103   < > 103  103   < > 106 104 105  105   BUN 49*  49*   < > 49*  49*   < > 53* 50* 52*  52*   CREATININE 1.3  1.3   < > 1.2  1.2   < > 1.3 1.3 1.5*  1.5*   ALKPHOS 107  107  --  130  --   --   --  127  127   ALT 23  23  --  34  --   --   --  40  40   AST 41*  41*  --  53*  --   --   --  58*  58*   BILITOT 1.4*  1.4*  --  1.4*  --   --   --  1.1*  1.1*    < > = values in this interval not displayed.      Coagulation:   Recent Labs   Lab 02/10/20  0401  02/11/20  0405 02/11/20  1909 02/12/20  0400   INR 1.1  --  1.2  --  1.2   APTT 30.4  30.4   < > 49.5*  49.5* 50.3* 47.3*  47.3*    < > = values in this interval not displayed.     LDH:  Recent Labs   Lab 02/10/20  0401 02/11/20  0405 02/12/20  0400   * 434* 427*     Microbiology:  Microbiology Results (last 7 days)     Procedure Component Value Units Date/Time    Blood culture [[id number]] Collected:  02/03/20 0946    Order Status:  Completed Specimen:  Blood from Peripheral, Hand, Right Updated:  02/08/20 1022     Blood Culture, Routine No growth after 5 days.    Blood culture [[id number]] Collected:  02/03/20 0946    Order Status:  Completed Specimen:  Blood from Peripheral, Hand, Right Updated:  02/08/20 1022     Blood Culture, Routine No growth after 5 days.          I have reviewed all pertinent labs within the past 24 hours.    Estimated Creatinine Clearance: 61.6 mL/min (A) (based on SCr of 1.5 mg/dL (H)).    Diagnostic Results:  I have reviewed and interpreted all pertinent imaging results/findings within the past 24 hours.

## 2020-02-12 NOTE — SUBJECTIVE & OBJECTIVE
"Interval HPI:   Overnight events:   BG is reasonably controlled while on vassopressor therapy and enteral nutrition without use of insulin therapy.   Diet Dysphagia Soft (IDDSI Level 6) Fluid - 2000mL; Nectar Thick  5 Days Post-Op    Eating:   NPO  Nausea: No  Hypoglycemia and intervention: No  Fever: No  TPN and/or TF: TF stopped. Patient given diet.   If yes, type of TF/TPN and rate: none       BP (!) 94/0 (BP Location: Left arm, Patient Position: Lying)   Pulse 104   Temp 97.5 °F (36.4 °C) (Oral)   Resp (!) 22   Ht 5' 7" (1.702 m)   Wt 96.6 kg (213 lb)   SpO2 99%   BMI 33.36 kg/m²     Labs Reviewed and Include    Recent Labs   Lab 02/12/20  0400 02/12/20  1557   *  120* 119*   CALCIUM 10.2  10.2 10.0   ALBUMIN 3.0*  3.0*  --    PROT 8.5*  8.5*  --    *  147* 147*   K 4.2  4.2 3.8   CO2 30*  30* 30*     105 104   BUN 52*  52* 55*   CREATININE 1.5*  1.5* 1.6*   ALKPHOS 127  127  --    ALT 40  40  --    AST 58*  58*  --    BILITOT 1.1*  1.1*  --      Lab Results   Component Value Date    WBC 18.11 (H) 02/12/2020    HGB 9.1 (L) 02/12/2020    HCT 29.2 (L) 02/12/2020    MCV 83 02/12/2020     02/12/2020     No results for input(s): TSH, FREET4 in the last 168 hours.  Lab Results   Component Value Date    HGBA1C 6.6 (H) 01/16/2020       Nutritional status:   Body mass index is 33.36 kg/m².  Lab Results   Component Value Date    ALBUMIN 3.0 (L) 02/12/2020    ALBUMIN 3.0 (L) 02/12/2020    ALBUMIN 3.0 (L) 02/11/2020     Lab Results   Component Value Date    PREALBUMIN 14 (L) 02/07/2020    PREALBUMIN 23 01/28/2020    PREALBUMIN 15 (L) 01/22/2020       Estimated Creatinine Clearance: 57.8 mL/min (A) (based on SCr of 1.6 mg/dL (H)).    Accu-Checks  Recent Labs     02/10/20  2006 02/11/20  0003 02/11/20  0406 02/11/20  0758 02/11/20  1211 02/12/20  0004 02/12/20  0359 02/12/20  0826 02/12/20  1001 02/12/20  1556   POCTGLUCOSE 137* 154* 139* 135* 91 114* 126* 120* 142* 108 "       Current Medications and/or Treatments Impacting Glycemic Control  Immunotherapy:    Immunosuppressants     None        Steroids:   Hormones (From admission, onward)    None        Pressors:    Autonomic Drugs (From admission, onward)    Start     Stop Route Frequency Ordered    02/12/20 1030  EPINEPHrine (ADRENALIN) 5 mg in sodium chloride 0.9% 250 mL infusion     Question Answer Comment   Titrate by: (in mcg/kg/min) 0.12    Titrate interval: (in minutes) 5    Titrate to maintain: (SBP or MAP or Cardiac Index) MAP    Greater than: (in mmHg) 65    Maximum dose: (in mcg/kg/min) 2        -- IV Continuous 02/12/20 1017        Hyperglycemia/Diabetes Medications:   Antihyperglycemics (From admission, onward)    Start     Stop Route Frequency Ordered    02/10/20 1106  insulin aspart U-100 pen 1-10 Units      -- SubQ Every 4 hours PRN 02/10/20 1006

## 2020-02-12 NOTE — PT/OT/SLP PROGRESS
Occupational Therapy   Treatment    Name: Saba Lott  MRN: [id number]  Admitting Diagnosis:  Presence of left ventricular assist device (LVAD)  5 Days Post-Op    Recommendations:     Discharge Recommendations: home health OT  Discharge Equipment Recommendations:  (TBS)  Barriers to discharge:  None    Assessment:     aSba Lott is a 54 y.o. male with a medical diagnosis of Presence of left ventricular assist device (LVAD).  He presents with the following performance deficits affecting function are weakness, impaired endurance, impaired self care skills, impaired functional mobilty, impaired balance, decreased upper extremity function, impaired cardiopulmonary response to activity.     Rehab Prognosis:  Good; patient would benefit from acute skilled OT services to address these deficits and reach maximum level of function.       Plan:     Patient to be seen 6 x/week to address the above listed problems via therapeutic activities, therapeutic exercises, self-care/home management  · Plan of Care Expires: 03/11/20  · Plan of Care Reviewed with: patient    Subjective     Pain/Comfort:  · Pain Rating 1: 8/10  · Location - Side 1: Bilateral  · Location - Orientation 1: midline  · Location 1: chest  · Pain Addressed 1: Reposition, Pre-medicate for activity, Nurse notified  · Pain Rating Post-Intervention 1: 8/10    Objective:     Communicated with: Nscalderon prior to session.  Patient found supine with telemetry, pulse ox (continuous), blood pressure cuff, chest tube, NG tube, arterial line, LVAD, central line upon OT entry to room.    General Precautions: Standard, aspiration, fall, sternal, LVAD   Orthopedic Precautions:N/A   Braces: N/A     Occupational Performance:     Bed Mobility:    · Patient completed Scooting/Bridging with minimum assistance  · Patient completed Supine to Sit with minimum assistance     Functional Mobility/Transfers:  · Patient completed Sit <> Stand Transfer with contact guard assistance  with  no  assistive device   · Functional Mobility: Minimal assistance within room, patient with posterior lean and c/o knee pain    Activities of Daily Living:  · Grooming: minimum assistance standing at sink for ~5 minutes, due to posterior lean and need for cueing for upright posture  · Lower Body Dressing: moderate assistance to adjust socks      AMPAC 6 Click ADL: 16    Treatment & Education:  Pt ed on roles/goals of OT and POC  White board updated  Pt ed on precautions and adaptive strategies for completion of ADL's and functional t/f's  LVAD found and remain on wall power  Pt re-ed on LVAD sternal precautions  Pt ed on LVAD components including controller, DL and power cords    Patient left up in chair with all lines intact, call button in reach and nsg notifiedEducation:      GOALS:   Multidisciplinary Problems     Occupational Therapy Goals        Problem: Occupational Therapy Goal    Goal Priority Disciplines Outcome Interventions   Occupational Therapy Goal     OT, PT/OT Ongoing, Progressing    Description:  Goals to be met by: 2/24/20     Patient will increase functional independence with ADLs by performing:    Feeding with [place].  UE Dressing with Supervision.  LE Dressing with Supervision.  Grooming while standing at sink with Supervision.  Toileting from toilet with Supervision for hygiene and clothing management.   Toilet transfer to toilet with Supervision.  Pt will be (I) with VAD management during ADL.               Multidisciplinary Problems (Resolved)        Problem: Occupational Therapy Goal    Goal Priority Disciplines Outcome Interventions   Occupational Therapy Goal   (Resolved)     OT, PT/OT Met    Description:  Goals to be met by: 7 days 1/30/20     Patient will increase functional independence with ADLs by performing:    Pt to complete UE dressing with set-up-MET  Pt to complete LE dressing with SBA with AE-MET   Pt to complete toileting with supervision.--MET  Pt to complete standing g/h  skills with supervision.--MET  Pt to complete t/f to commode, bed and chair with SBA--MET                         Time Tracking:     OT Date of Treatment: 02/12/20  OT Start Time: 0839  OT Stop Time: 0908  OT Total Time (min): 29 min    Billable Minutes:Self Care/Home Management 15  Therapeutic Activity 14    Lilly Wu, OT  2/12/2020

## 2020-02-12 NOTE — PLAN OF CARE
Plan of Care Note  Cardiothoracic Surgery    Saba Lott is a 54 y.o. male with heart failure who underwent LVAD placement (2/6/20) and chest closure (2/7/20).    Specific issues: decrease lasix to 15, increase norvasc, coumadin 4, fgolytely 100cc q4h, nitric to 10 with extubation, wean epi q12h, free water 250 q8, d/c L chest tube, d/c barton    Plan of care for patient was discussed with ICU staff including nurses, residents, and faculty and appropriate consulting services.    Will continue to monitor patient's hemodynamics, functionality, neuro status, fluid status and renal function, and labs and will adjust medications and fluids as necessary while monitoring appropriateness for de-escalation of support and monitoring and transport to stepdown unit.    Benjy Street MD  Cardiothoracic Surgery Fellow

## 2020-02-12 NOTE — PT/OT/SLP PROGRESS
Physical Therapy Treatment    Patient Name:  Saba Lott   MRN:  [id number]    Recommendations:     Discharge Recommendations:  home health PT   Discharge Equipment Recommendations: (TBD)   Barriers to discharge: None    Assessment:     Saba Lott is a 54 y.o. male admitted with a medical diagnosis of Presence of left ventricular assist device (LVAD).  He presents with the following impairments/functional limitations:  weakness, impaired endurance, impaired self care skills, impaired functional mobilty, gait instability, impaired balance, decreased upper extremity function, impaired cardiopulmonary response to activity. Pt with good tolerance to session. Pt able to tolerate ambulating a farther distance on this date. Pt continue present with a posterior lean during ambulation. Pt with increased effort during ambulation. Will eventually try a platform RW since pt used a rollator at baseline.     Rehab Prognosis: Good; patient would benefit from acute skilled PT services to address these deficits and reach maximum level of function.    Recent Surgery: Procedure(s) (LRB):  CLOSURE, WOUND, STERNUM (N/A)  WASHOUT  INSERTION, GRAFT, PERICARDIUM  APPLICATION, WOUND VAC 5 Days Post-Op    Plan:     During this hospitalization, patient to be seen 6 x/week to address the identified rehab impairments via gait training, therapeutic activities, therapeutic exercises, neuromuscular re-education and progress toward the following goals:    · Plan of Care Expires:  03/10/20    Subjective     Chief Complaint: none reported   Patient/Family Comments/goals: to get better and return home   Pain/Comfort:  · Pain Rating 1: 0/10  · Pain Rating Post-Intervention 1: 0/10      Objective:     Communicated with RN prior to session.  Patient found HOB elevated with telemetry, pulse ox (continuous), blood pressure cuff, peripheral IV, PICC line, central line, chest tube, NG tube, arterial line, LVAD upon PT entry to room.     General Precautions:  Standard, fall, LVAD, sternal   Orthopedic Precautions:N/A   Braces: N/A     Functional Mobility:  · Bed Mobility:     · Scooting: minimum assistance  · Supine to Sit: minimum assistance  · Transfers:     · Sit to Stand:  CGA with no AD   · Pt with posterior lean  · Gait: 10ft +10ft with min A within room  · Pt demo'd posterior lean, decreased ladarius, decreased step length, and narrow SKYLER  · Pt c/o knee pain   · Cuing for anterior weightshift and to widen SKYLER  · No overt LOB but pt unsteady  · Slight SOB present       AM-PAC 6 CLICK MOBILITY  Turning over in bed (including adjusting bedclothes, sheets and blankets)?: 3  Sitting down on and standing up from a chair with arms (e.g., wheelchair, bedside commode, etc.): 3  Moving from lying on back to sitting on the side of the bed?: 3  Moving to and from a bed to a chair (including a wheelchair)?: 3  Need to walk in hospital room?: 3  Climbing 3-5 steps with a railing?: 1  Basic Mobility Total Score: 16       Therapeutic Activities and Exercises:  Educated pt on PT role/POC  Educated pt on importance of OOB activity and daily ambulation   Educated pt on sternal precautions   Pt verbalized understanding     LVAD found and remained on wall power  LVAD education provided: names of controller, drive line, and power cables; keeping controller close to prevent pulling of drive line   No alarms sounded     Standing at sink x 5 minute with min A 2nd to posterior lean to perform ADLs with OT    T/f to chair to increase tolerance to OOB activity and to create optimal positioning for lung expansion     Patient left up in chair with all lines intact, call button in reach and RN notified..    GOALS:   Multidisciplinary Problems     Physical Therapy Goals        Problem: Physical Therapy Goal    Goal Priority Disciplines Outcome Goal Variances Interventions   Physical Therapy Goal     PT, PT/OT Ongoing, Progressing     Description:  Goals to be met by: 3/9/2020     Patient will  increase functional independence with mobility by performin. Supine to sit with CGA- not met  2. Sit to stand transfer with Supervision- not met  3. Gait  x 150 feet with Supervision - not met                           Time Tracking:     PT Received On: 20  PT Start Time: 842     PT Stop Time: 911  PT Total Time (min): 29 min     Billable Minutes: Gait Training 23    Treatment Type: Treatment  PT/PTA: PT     PTA Visit Number: 0     Eva Garza PT, DPT  2020  [phone]

## 2020-02-12 NOTE — CARE UPDATE
Pt OOB to chair per 2hrs. Nitric weaned to 10ppm, 3LNC. PRN BP meds given to keep <58. VAD speed 5200. Flows stable in the 4's. Epi gtt weaned to 0.02mcg/kg/min. Lasix gtt weaned to 15mg/hr. Left chest tube pulled per MD. TF stopped per Dr. Joshi. Go-lytely 100mL q4h to facilitate BM. Pt passing gas. VAD dsg changed per sterile technique.

## 2020-02-12 NOTE — PROGRESS NOTES
02/12/2020  Tommy Cage    Current provider:  David Joshi MD      I, Tommy Cage, rounded on Saba Oren to ensure all mechanical assist device settings (IABP or VAD) were appropriate and all parameters were within limits.  I was able to ensure all back up equipment was present, the staff had no issues, and the Perfusion Department daily rounding was complete.    10:49 AM

## 2020-02-12 NOTE — PROGRESS NOTES
[facility]  Critical Care - Surgery  Progress Note    Patient Name: Saba Lott  MRN: [id number]  Admission Date: 1/15/2020  Hospital Length of Stay: 28 days  Code Status: Prior  Attending Provider: David Joshi MD  Primary Care Provider: Primary Doctor No   Principal Problem: Presence of left ventricular assist device (LVAD)    Subjective:     Hospital/ICU Course:  No notes on file    Interval History/Significant Events: POD 6 LVAD placement and POD 5 chest closure. No acute events overnight. Diamox 500 given yesterday. Epi weaned to 0.01 but increased to 0.02 this AM for CVP of 15. Patient continues to have belching and no BM. Continue to hold TF and give golytely and miralax.     CT pulled this AM - 4hr post pull CXR  Enteral free water boluses now q4 from q8  Coumadin 4 at 5pm  Wean epi per CVP    Follow-up For: Procedure(s) (LRB):  CLOSURE, WOUND, STERNUM (N/A)  INSERTION-RIGHT VENTRICULAR ASSIST DEVICE (Right)    Post-Operative Day: 5    Objective:     Vital Signs (Most Recent):  Temp: 97.5 °F (36.4 °C) (02/12/20 1100)  Pulse: (!) 116 (02/12/20 1100)  Resp: (!) 22 (02/11/20 1700)  BP: (!) 92/0 (02/12/20 1050)  SpO2: 99 % (02/11/20 2300) Vital Signs (24h Range):  Temp:  [97.5 °F (36.4 °C)-98.9 °F (37.2 °C)] 97.5 °F (36.4 °C)  Pulse:  [] 116  Resp:  [16-23] 22  SpO2:  [99 %] 99 %  BP: (84-98)/(0) 92/0  Arterial Line BP: ()/() 95/78     Weight: 97 kg (213 lb 13.5 oz)  Body mass index is 33.49 kg/m².      Intake/Output Summary (Last 24 hours) at 2/12/2020 1116  Last data filed at 2/12/2020 1050  Gross per 24 hour   Intake 2277 ml   Output 1300 ml   Net 977 ml       Physical Exam   Constitutional: He is oriented to person, place, and time. He appears well-developed and well-nourished.   HENT:   Head: Normocephalic and atraumatic.   Cardiovascular: Normal rate and regular rhythm.   LVAD in place  Introducer in place   Pulmonary/Chest: Effort normal. No respiratory distress.   NC    Abdominal: Soft.   Genitourinary:   Genitourinary Comments: Mc cath in place   Neurological: He is alert and oriented to person, place, and time.   Skin: Skin is warm and dry.   Psychiatric: He has a normal mood and affect. His behavior is normal.   Nursing note and vitals reviewed.      Vents: N/A    Lines/Drains/Airways     Peripherally Inserted Central Catheter Line                 PICC Triple Lumen 02/08/20 1530 right basilic 3 days          Central Venous Catheter Line                 Percutaneous Central Line Insertion/Assessment - Quad lumen  02/06/20 0742 6 days          Drain                 Chest Tube 02/06/20 1250 Right Mediastinal 5 days         NG/OG Tube 02/11/20 2120 [place] sump Right nostril less than 1 day          Arterial Line                 Arterial Line 02/06/20 0736 Left Other (Comment) 6 days          Line                 VAD 02/06/20 1047 Left ventricular assist device HeartMate 3 6 days          Peripheral Intravenous Line                 Peripheral IV - Single Lumen 02/10/20 1400 18 G Left Wrist 1 day                Significant Labs:    CBC/Anemia Profile:  Recent Labs   Lab 02/10/20  1615 02/11/20  0404 02/12/20  0400   WBC 19.67* 20.42* 18.11*   HGB 9.5* 9.5* 9.1*   HCT 30.9* 30.3* 29.2*    279 319   MCV 84 84 83   RDW 18.5* 18.3* 18.7*        Chemistries:  Recent Labs   Lab 02/11/20  0405  02/11/20  2023 02/12/20  0004 02/12/20  0400     145   < > 147* 146* 147*  147*   K 3.8  3.8   < > 4.0 4.3 4.2  4.2     103   < > 106 104 105  105   CO2 30*  30*   < > 29 30* 30*  30*   BUN 49*  49*   < > 53* 50* 52*  52*   CREATININE 1.2  1.2   < > 1.3 1.3 1.5*  1.5*   CALCIUM 10.3  10.3   < > 10.4 10.1 10.2  10.2   ALBUMIN 3.0*  --   --   --  3.0*  3.0*   PROT 8.6*  --   --   --  8.5*  8.5*   BILITOT 1.4*  --   --   --  1.1*  1.1*   ALKPHOS 130  --   --   --  127  127   ALT 34  --   --   --  40  40   AST 53*  --   --   --  58*  58*   MG 2.6  2.6   < > 2.4  2.6 2.8*  2.8*   PHOS 3.2  --   --   --  3.6    < > = values in this interval not displayed.       ABGs:   Recent Labs   Lab 02/12/20  0101   PH 7.505*   PCO2 38.1   HCO3 30.1*   POCSATURATED 100   BE 7     BMP:   Recent Labs   Lab 02/12/20 0400   *  120*   *  147*   K 4.2  4.2     105   CO2 30*  30*   BUN 52*  52*   CREATININE 1.5*  1.5*   CALCIUM 10.2  10.2   MG 2.8*  2.8*     CMP:   Recent Labs   Lab 02/11/20 0405 02/11/20 2023 02/12/20  0004 02/12/20 0400     145   < > 147* 146* 147*  147*   K 3.8  3.8   < > 4.0 4.3 4.2  4.2     103   < > 106 104 105  105   CO2 30*  30*   < > 29 30* 30*  30*   *  135*   < > 126* 115* 120*  120*   BUN 49*  49*   < > 53* 50* 52*  52*   CREATININE 1.2  1.2   < > 1.3 1.3 1.5*  1.5*   CALCIUM 10.3  10.3   < > 10.4 10.1 10.2  10.2   PROT 8.6*  --   --   --  8.5*  8.5*   ALBUMIN 3.0*  --   --   --  3.0*  3.0*   BILITOT 1.4*  --   --   --  1.1*  1.1*   ALKPHOS 130  --   --   --  127  127   AST 53*  --   --   --  58*  58*   ALT 34  --   --   --  40  40   ANIONGAP 12  12   < > 12 12 12  12   EGFRNONAA >60.0  >60.0   < > >60.0 >60.0 51.7*  51.7*    < > = values in this interval not displayed.     Coagulation:   Recent Labs   Lab 02/12/20 0400   INR 1.2   APTT 47.3*  47.3*     Lactic Acid: No results for input(s): LACTATE in the last 48 hours.  All pertinent labs within the past 24 hours have been reviewed.    Significant Imaging:  I have reviewed all pertinent imaging results/findings within the past 24 hours.     CXR: no significant detrimental interval changes compared to 2/11/20 CXR    Assessment/Plan:     Non-ischemic cardiomyopathy  Neuro:   -Pain control: prn dilaudid and PO oxy  -No sedation    Pulmonary:   -extubated on 3L NC, continue to wean as tolerated  -Radha to 5  -lasix gtt at 15  -scheduled diuril 250 daily  -CPT q6hrs    Cardiac:  -MAP goal >65  -Epi 0.02 wean based on CVPs,  5  -amlodipine  10  -LVAD speed 5200, flows ~4.6  -LDH trending down  -CT pulled today. F/u post pull CXR  -PICC in place    Renal:   - Mc d/c 2/11  -UOP adequate  -Bun/Cr 52/1.5  -lasix gtt at 15    Fluids/Electrolytes/Nutrition/GI:   -Nutritional status: NPO  -bar Mg, K  -NGT  -TF - holding until patient has BM  -SLP c/s  -250 free water flushes q4hrs  -bowel regimen: Miralax and docusate - will scheduled suppository today  -Golytely: 100 q4hrs    Hematology/Oncology:  -H/H stable 9.1/29.2 (from 9.5/30.3)  -INR/Plts 1.2/319  -Trend CBC  -coumadin 4 at 5 pm   -hep 1100 goal 45 - 54    Infectious Disease:   -Afebrile  -WBC 18.11  -Abx: completed course of vanc and ancef    Endocrine:  -Glucose goal of 120-180  -Insulin gtt per endo    Dispo:  -Continue care in the ICU setting. Wean epi. OOBTC, PT/OT/SLP. Continue bowel regimen         Critical Care Daily Checklist:    A: Awake: RASS Goal/Actual Goal: RASS Goal: 0-->alert and calm  Actual: [place] Agitation Sedation Scale (RASS): Alert and calm   B: Spontaneous Breathing Trial Performed? Spon. Breathing Trial Initiated?: Initiated (02/10/20 0910)   C: SAT & SBT Coordinated?  n/a                      D: Delirium: CAM-ICU Overall CAM-ICU: Negative   E: Early Mobility Performed? Yes   F: Feeding Goal: Goals: Patient to receive nutrition by RD follow-up  Status: Nutrition Goal Status: progressing towards goal   Current Diet Order   Procedures    Diet NPO      AS: Analgesia/Sedation Prn oxy and dilaudid  No sedation   T: Thromboembolic Prophylaxis Heparin gtt   H: HOB > 300 Yes   U: Stress Ulcer Prophylaxis (if needed) protonix   G: Glucose Control SSI   B: Bowel Function Stool Occurrence: 1   I: Indwelling Catheter (Lines & Mc) Necessity [place], PICC, NGT, CVC   D: De-escalation of Antimicrobials/Pharmacotherapies No abx    Plan for the day/ETD Wean epi, continue bowel reg, pt/ot/slp    Code Status:  Family/Goals of Care: Prior         Critical secondary to Patient has a  condition that poses threat to life and bodily function: s/p LVAD placement     Critical care was time spent personally by me on the following activities: development of treatment plan with patient or surrogate and bedside caregivers, discussions with consultants, evaluation of patient's response to treatment, examination of patient, ordering and performing treatments and interventions, ordering and review of laboratory studies, ordering and review of radiographic studies, pulse oximetry, re-evaluation of patient's condition.  This critical care time did not overlap with that of any other provider or involve time for any procedures.     Peggy Sanders MD  Critical Care - Surgery  [facility]

## 2020-02-12 NOTE — PLAN OF CARE
Plan of Care Note  Cardiothoracic Surgery    Saba Lott is a 54 y.o. male with heart failure who underwent LVAD placement (2/6/20) and chest closure (2/7/20).    Specific issues: hep with ptt goal 45-54, tube feeds at 20/hr, coumadin 2, wpi at 0.05, enteral replacement of electrolytes, coumadin 2; norvasc 2.5    Plan of care for patient was discussed with ICU staff including nurses, residents, and faculty and appropriate consulting services.    Will continue to monitor patient's hemodynamics, functionality, neuro status, fluid status and renal function, and labs and will adjust medications and fluids as necessary while monitoring appropriateness for de-escalation of support and monitoring and transport to stepdown unit.    Benjy Street MD  Cardiothoracic Surgery Fellow

## 2020-02-12 NOTE — SUBJECTIVE & OBJECTIVE
Interval History/Significant Events: POD 6 LVAD placement and POD 5 chest closure. No acute events overnight. Diamox 500 given yesterday. Epi weaned to 0.01 but increased to 0.02 this AM for CVP of 15. Patient continues to have belching and no BM. Continue to hold TF and give golytely and miralax.     CT pulled this AM - 4hr post pull CXR  Enteral free water boluses now q4 from q8  Coumadin 4 at 5pm  Wean epi per CVP    Follow-up For: Procedure(s) (LRB):  CLOSURE, WOUND, STERNUM (N/A)  INSERTION-RIGHT VENTRICULAR ASSIST DEVICE (Right)    Post-Operative Day: 5    Objective:     Vital Signs (Most Recent):  Temp: 97.5 °F (36.4 °C) (02/12/20 1100)  Pulse: (!) 116 (02/12/20 1100)  Resp: (!) 22 (02/11/20 1700)  BP: (!) 92/0 (02/12/20 1050)  SpO2: 99 % (02/11/20 2300) Vital Signs (24h Range):  Temp:  [97.5 °F (36.4 °C)-98.9 °F (37.2 °C)] 97.5 °F (36.4 °C)  Pulse:  [] 116  Resp:  [16-23] 22  SpO2:  [99 %] 99 %  BP: (84-98)/(0) 92/0  Arterial Line BP: ()/() 95/78     Weight: 97 kg (213 lb 13.5 oz)  Body mass index is 33.49 kg/m².      Intake/Output Summary (Last 24 hours) at 2/12/2020 1116  Last data filed at 2/12/2020 1050  Gross per 24 hour   Intake 2277 ml   Output 1300 ml   Net 977 ml       Physical Exam   Constitutional: He is oriented to person, place, and time. He appears well-developed and well-nourished.   HENT:   Head: Normocephalic and atraumatic.   Cardiovascular: Normal rate and regular rhythm.   LVAD in place  Introducer in place   Pulmonary/Chest: Effort normal. No respiratory distress.   NC   Abdominal: Soft.   Genitourinary:   Genitourinary Comments: Mc cath in place   Neurological: He is alert and oriented to person, place, and time.   Skin: Skin is warm and dry.   Psychiatric: He has a normal mood and affect. His behavior is normal.   Nursing note and vitals reviewed.      Vents: N/A    Lines/Drains/Airways     Peripherally Inserted Central Catheter Line                 PICC Triple  Lumen 02/08/20 1530 right basilic 3 days          Central Venous Catheter Line                 Percutaneous Central Line Insertion/Assessment - Quad lumen  02/06/20 0742 6 days          Drain                 Chest Tube 02/06/20 1250 Right Mediastinal 5 days         NG/OG Tube 02/11/20 2120 [place] sump Right nostril less than 1 day          Arterial Line                 Arterial Line 02/06/20 0736 Left Other (Comment) 6 days          Line                 VAD 02/06/20 1047 Left ventricular assist device HeartMate 3 6 days          Peripheral Intravenous Line                 Peripheral IV - Single Lumen 02/10/20 1400 18 G Left Wrist 1 day                Significant Labs:    CBC/Anemia Profile:  Recent Labs   Lab 02/10/20  1615 02/11/20  0404 02/12/20  0400   WBC 19.67* 20.42* 18.11*   HGB 9.5* 9.5* 9.1*   HCT 30.9* 30.3* 29.2*    279 319   MCV 84 84 83   RDW 18.5* 18.3* 18.7*        Chemistries:  Recent Labs   Lab 02/11/20  0405  02/11/20 2023 02/12/20  0004 02/12/20  0400     145   < > 147* 146* 147*  147*   K 3.8  3.8   < > 4.0 4.3 4.2  4.2     103   < > 106 104 105  105   CO2 30*  30*   < > 29 30* 30*  30*   BUN 49*  49*   < > 53* 50* 52*  52*   CREATININE 1.2  1.2   < > 1.3 1.3 1.5*  1.5*   CALCIUM 10.3  10.3   < > 10.4 10.1 10.2  10.2   ALBUMIN 3.0*  --   --   --  3.0*  3.0*   PROT 8.6*  --   --   --  8.5*  8.5*   BILITOT 1.4*  --   --   --  1.1*  1.1*   ALKPHOS 130  --   --   --  127  127   ALT 34  --   --   --  40  40   AST 53*  --   --   --  58*  58*   MG 2.6  2.6   < > 2.4 2.6 2.8*  2.8*   PHOS 3.2  --   --   --  3.6    < > = values in this interval not displayed.       ABGs:   Recent Labs   Lab 02/12/20  0101   PH 7.505*   PCO2 38.1   HCO3 30.1*   POCSATURATED 100   BE 7     BMP:   Recent Labs   Lab 02/12/20  0400   *  120*   *  147*   K 4.2  4.2     105   CO2 30*  30*   BUN 52*  52*   CREATININE 1.5*  1.5*   CALCIUM 10.2  10.2   MG 2.8*   2.8*     CMP:   Recent Labs   Lab 02/11/20  0405  02/11/20 2023 02/12/20  0004 02/12/20  0400     145   < > 147* 146* 147*  147*   K 3.8  3.8   < > 4.0 4.3 4.2  4.2     103   < > 106 104 105  105   CO2 30*  30*   < > 29 30* 30*  30*   *  135*   < > 126* 115* 120*  120*   BUN 49*  49*   < > 53* 50* 52*  52*   CREATININE 1.2  1.2   < > 1.3 1.3 1.5*  1.5*   CALCIUM 10.3  10.3   < > 10.4 10.1 10.2  10.2   PROT 8.6*  --   --   --  8.5*  8.5*   ALBUMIN 3.0*  --   --   --  3.0*  3.0*   BILITOT 1.4*  --   --   --  1.1*  1.1*   ALKPHOS 130  --   --   --  127  127   AST 53*  --   --   --  58*  58*   ALT 34  --   --   --  40  40   ANIONGAP 12  12   < > 12 12 12  12   EGFRNONAA >60.0  >60.0   < > >60.0 >60.0 51.7*  51.7*    < > = values in this interval not displayed.     Coagulation:   Recent Labs   Lab 02/12/20 0400   INR 1.2   APTT 47.3*  47.3*     Lactic Acid: No results for input(s): LACTATE in the last 48 hours.  All pertinent labs within the past 24 hours have been reviewed.    Significant Imaging:  I have reviewed all pertinent imaging results/findings within the past 24 hours.     CXR: no significant detrimental interval changes compared to 2/11/20 CXR

## 2020-02-12 NOTE — PLAN OF CARE
Pt remains AAOx4. NC 3L nitric 10 ppm. O2 sat >96%. Afebrile. ST<110. CVP 12-14 flat. Map 65-85 mmHg maintained. Diminished breath sounds. LVAD speed 5200, no alarms noted. Abd soft, distended. Pt reports passing gas; no bm noted. NGT clamped. Free water and golytely boluses given per order. Voids 100-200 ml/2-3 hours. Minimal serosanguinous drainage from chest tube. Pulses dopplerablex4 extremities. Skin intact. No breakdown noted. Sacral and heel foams in place. Pt turned and repositioned q hour. Remains on epi, lasix, dobut, heparin. Pt updated on POC.

## 2020-02-12 NOTE — PROGRESS NOTES
NGT inadvertently removed by patient. Dr. Gu notified, orders placed for new one. Pt tolerated NGT placement well; chest x-ray pending.

## 2020-02-12 NOTE — PROGRESS NOTES
[facility]  Heart Transplant  Progress Note    Patient Name: Saba Lott  MRN: [id number]  Admission Date: 1/15/2020  Hospital Length of Stay: 28 days  Attending Physician: David Joshi MD  Primary Care Provider: Primary Doctor No  Principal Problem:Presence of left ventricular assist device (LVAD)    Subjective:     Interval History: diet advanced per speech to dental soft      Continuous Infusions:   sodium chloride 0.9% 10 mL/hr at 02/07/20 1632    dexmedetomidine (PRECEDEX) infusion Stopped (02/10/20 0800)    dextrose 5 % and 0.45 % NaCl with KCl 40 mEq 10 mL/hr at 02/07/20 1632    DOBUTamine 5 mcg/kg/min (02/12/20 1400)    epinephrine 0.02 mcg/kg/min (02/12/20 1400)    furosemide (LASIX) 2 mg/mL continuous infusion (non-titrating) 15 mg/hr (02/12/20 1400)    heparin (porcine) in 5 % dex 1,100 Units/hr (02/12/20 1400)    nicardipine 2.5 mg/hr (02/11/20 0600)    nitric oxide gas       Scheduled Meds:   amLODIPine  10 mg Oral Daily    aspirin  325 mg Per NG tube Daily    bisacodyL  10 mg Rectal Once    chlorothiazide (DIURIL) IVPB  250 mg Intravenous Q24H    docusate sodium  100 mg Oral BID    ferrous gluconate  324 mg Oral Daily with breakfast    magnesium oxide  800 mg Oral TID    pantoprazole  40 mg Intravenous Daily    polyethylene glycol  17 g Oral Daily    potassium chloride 10%  20 mEq Per NG tube BID    sodium chloride 0.9%  10 mL Intravenous Q6H    warfarin  4 mg Oral Once     PRN Meds:albumin human 5%, albuterol sulfate, bisacodyL, Dextrose 10% Bolus, Dextrose 10% Bolus, Dextrose 10% Bolus, Dextrose 10% Bolus, Dextrose 10% Bolus, diphenhydrAMINE, glucagon (human recombinant), hydrALAZINE, HYDROmorphone, insulin aspart U-100, magnesium hydroxide 400 mg/5 ml, magnesium sulfate IVPB, oxyCODONE, oxyCODONE, potassium chloride 10%, potassium chloride 10%, potassium chloride 10%, potassium chloride 10%, sodium chloride 0.9%, Flushing PICC Protocol **AND** sodium chloride  0.9% **AND** sodium chloride 0.9%    Review of patient's allergies indicates:   Allergen Reactions    Iodine and iodide containing products      Objective:     Vital Signs (Most Recent):  Temp: 97.5 °F (36.4 °C) (02/12/20 1100)  Pulse: 109 (02/12/20 1415)  Resp: (!) 22 (02/11/20 1700)  BP: (!) 103/90 (02/12/20 1315)  SpO2: 99 % (02/12/20 1118) Vital Signs (24h Range):  Temp:  [97.5 °F (36.4 °C)-98.9 °F (37.2 °C)] 97.5 °F (36.4 °C)  Pulse:  [] 109  Resp:  [20-22] 22  SpO2:  [99 %] 99 %  BP: ()/(0-90) 103/90  Arterial Line BP: ()/() 100/82     Patient Vitals for the past 72 hrs (Last 3 readings):   Weight   02/12/20 1315 96.6 kg (213 lb)   02/12/20 0505 97 kg (213 lb 13.5 oz)   02/11/20 0500 104 kg (229 lb 4.5 oz)     Body mass index is 33.36 kg/m².      Intake/Output Summary (Last 24 hours) at 2/12/2020 1426  Last data filed at 2/12/2020 1400  Gross per 24 hour   Intake 2147 ml   Output 1100 ml   Net 1047 ml       Hemodynamic Parameters:           Physical Exam   Constitutional: He is oriented to person, place, and time. He appears well-developed and well-nourished.   HENT:   Head: Normocephalic and atraumatic.   Cardiovascular: Normal rate and regular rhythm.   LVAD in place  Introducer in place   Pulmonary/Chest: Effort normal. No respiratory distress.   NC   Abdominal: Soft.   Genitourinary:   Genitourinary Comments: Mc cath in place   Neurological: He is alert and oriented to person, place, and time.   Skin: Skin is warm and dry.   Psychiatric: He has a normal mood and affect. His behavior is normal.   Nursing note and vitals reviewed.      Significant Labs:  CBC:  Recent Labs   Lab 02/10/20  1615 02/11/20  0404 02/12/20  0400   WBC 19.67* 20.42* 18.11*   RBC 3.66* 3.63* 3.51*   HGB 9.5* 9.5* 9.1*   HCT 30.9* 30.3* 29.2*    279 319   MCV 84 84 83   MCH 26.0* 26.2* 25.9*   MCHC 30.7* 31.4* 31.2*     BNP:  Recent Labs   Lab 02/06/20  0334 02/10/20  0401 02/12/20  0400   BNP 1,420*  887* 1,356*     CMP:  Recent Labs   Lab 02/10/20  0401  02/11/20  0405  02/11/20 2023 02/12/20  0004 02/12/20  0400   *  133*   < > 135*  135*   < > 126* 115* 120*  120*   CALCIUM 9.6  9.6   < > 10.3  10.3   < > 10.4 10.1 10.2  10.2   ALBUMIN 2.8*  2.8*  --  3.0*  --   --   --  3.0*  3.0*   PROT 7.9  7.9  --  8.6*  --   --   --  8.5*  8.5*     143   < > 145  145   < > 147* 146* 147*  147*   K 3.8  3.8   < > 3.8  3.8   < > 4.0 4.3 4.2  4.2   CO2 25  25   < > 30*  30*   < > 29 30* 30*  30*     103   < > 103  103   < > 106 104 105  105   BUN 49*  49*   < > 49*  49*   < > 53* 50* 52*  52*   CREATININE 1.3  1.3   < > 1.2  1.2   < > 1.3 1.3 1.5*  1.5*   ALKPHOS 107  107  --  130  --   --   --  127  127   ALT 23  23  --  34  --   --   --  40  40   AST 41*  41*  --  53*  --   --   --  58*  58*   BILITOT 1.4*  1.4*  --  1.4*  --   --   --  1.1*  1.1*    < > = values in this interval not displayed.      Coagulation:   Recent Labs   Lab 02/10/20  0401 02/11/20  0405 02/11/20  1909 02/12/20  0400   INR 1.1  --  1.2  --  1.2   APTT 30.4  30.4   < > 49.5*  49.5* 50.3* 47.3*  47.3*    < > = values in this interval not displayed.     LDH:  Recent Labs   Lab 02/10/20  0401 02/11/20  0405 02/12/20  0400   * 434* 427*     Microbiology:  Microbiology Results (last 7 days)     Procedure Component Value Units Date/Time    Blood culture [[id number]] Collected:  02/03/20 0946    Order Status:  Completed Specimen:  Blood from Peripheral, Hand, Right Updated:  02/08/20 1022     Blood Culture, Routine No growth after 5 days.    Blood culture [[id number]] Collected:  02/03/20 0946    Order Status:  Completed Specimen:  Blood from Peripheral, Hand, Right Updated:  02/08/20 1022     Blood Culture, Routine No growth after 5 days.          I have reviewed all pertinent labs within the past 24 hours.    Estimated Creatinine Clearance: 61.6 mL/min (A) (based on SCr of 1.5 mg/dL  (H)).    Diagnostic Results:  I have reviewed and interpreted all pertinent imaging results/findings within the past 24 hours.    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 Chronic MARC - Class II-III and mild LE edema.      Hospital Course (synopsis of major diagnoses, care, treatment, and services provided during the course of the hospital stay):  -2/12 admit to CDU for diuresis with IV lasix  -IV abx   -CXR with suspected small left pleural effusion with associated left basilar atelectasis versus evolving airspace disease  -2/13 single chamber ICD implant; IV lasix decreased to BID  -2/16 add dobutamine, hold BB due to hypotension, no ACE-I or ARB due to recent HPI hypotension  -2/17 Lasix changed to PO  -2/18 dobutamine discontinued    Admitted to [facility] on Thursday due to severe SOB for a week with associated orthopnea, MARC, and PND unable to lie flat and found to be in acute diastolic heart failure. States he normally weighs around 219 but up to 254lb on admission tonight. Says he hasn't been the most compliant in terms of fluid restriction and daily weights but has avoided salt. BNP on admission was 2375. BUN/CRT 32/1.4 He was started on IV diuretics but continued to deteriorate. Creatinine continued to increase and reached 4.3 with oliguria. He was started on CRRT for a few days and tolerated well. Renal u/s was negative for obstruction and liver u/s without findings of cirrhosis. All of this was progression of cardiorenal syndrome with liver congestion from severe volume overload. On arrival vitals stable and in no acute distress. He has obvious anasarca up to the chest. He did have uop of 500 at time of arrival also.    TTE 5/28/19  · Moderate left ventricular enlargement.  · Severely decreased left  ventricular systolic function. The estimated ejection fraction is 20%  · Global hypokinetic wall motion.  · Grade III (severe) left ventricular diastolic dysfunction consistent with restrictive physiology.  · Severe left atrial enlargement.  · Moderate right ventricular enlargement.  · Low normal right ventricular systolic function.  · Mild right atrial enlargement.  · Moderate mitral regurgitation.  Mild to moderate tricuspid regurgitation    * Presence of left ventricular assist device (LVAD)  -HeartMate 3 Implanted 02/06/2020 as DT. Chest closed 2/7.   -CTS Primary.  -Implanted by Dr. Joshi.  -INR sub therapeutic Coumadin, Goal INR 2.0-3.0. Anticoag per CTS.   -Antiplatelets  mg.  -LDH is stable overall today. Will continue to monitor daily.  -Continues on Epi, , Cardene, Radha.  -CVP elevated. Continue aggressive diuresis.   -Speed set at 5200, LSL 4800 rpm.  -Not listed for OHTx.  Procedure: Device Interrogation Including analysis of device parameters.  Current Settings: Ventricular Assist Device.  Review of device function is stable/unstable stable.    TXP LVAD INTERROGATIONS 2/12/2020 2/12/2020 2/12/2020 2/12/2020 2/12/2020 2/12/2020 2/12/2020   Type HeartMate3 HeartMate3 HeartMate3 HeartMate3 HeartMate3 HeartMate3 HeartMate3   Flow 4.4 4.4 4.5 4.4 4.3 4.2 4.3   Speed 5200 5200 5200 5200 5200 5200 5200   PI 4.2 5.0 4.8 5.1 4.8 4.6 4.0   Power (Centeno) 3.8 3.9 3.8 3.9 3.8 3.8 3.9   LSL 4800 4800 4800 4800 4800 4800 4800   Pulsatility Pulse Pulse Pulse Pulse Pulse Pulse Pulse       Acute hyperglycemia  -HgA1C 6.6  -Endocrine following    Moderate malnutrition  - Boost glucose control added 1/27   - Prealbumin is 23    Morbid obesity  -Weight down considerably since admit with diuresis.    ANJELICA (acute kidney injury)  -Creatinine was 3.0 on admit and got as high as 4.3 at OSH prior to transfer. Renal function was normal 8 months ago.  -Trending down today.     Cardiogenic shock  -NICM; Likely Etoh  induced  -Transferred from [facility] with IABP at 1:1 for further level of care. IABP removed 1/25 and replaced 2/3.   -S/P HMIII on 2/3.     Deep vein thrombosis (DVT) of right lower extremity  -Unsure of timing but was on eliquis PTA.   -Will anticoag with heparin/warfarin post VAD.     AICD (automatic cardioverter/defibrillator) present  -Medtronic single chamber ICD placed 2019 for primary prevention        CONG eSlby  Heart Transplant  [facility]

## 2020-02-12 NOTE — PROGRESS NOTES
Increased bloody drainage noted to LVAD driveline dressing. Dr. Gu notified, came to bedside to assess. Heparin gtt is therapeutic according to ptt goal. Will continue to monitor.      Dr. Venegas at bedside, stated amount of bleeding was ok due to heparin gtt. Dressing changed using sterile technique. No additional bleeding noted. Pt tolerated well. Will continue to monitor.

## 2020-02-13 LAB
ALBUMIN SERPL BCP-MCNC: 2.8 G/DL (ref 3.5–5.2)
ALLENS TEST: ABNORMAL
ALP SERPL-CCNC: 134 U/L (ref 55–135)
ALT SERPL W/O P-5'-P-CCNC: 45 U/L (ref 10–44)
ANION GAP SERPL CALC-SCNC: 13 MMOL/L (ref 8–16)
ANION GAP SERPL CALC-SCNC: 7 MMOL/L (ref 8–16)
ANION GAP SERPL CALC-SCNC: 8 MMOL/L (ref 8–16)
ANION GAP SERPL CALC-SCNC: 9 MMOL/L (ref 8–16)
APTT BLDCRRT: 49.6 SEC (ref 21–32)
APTT BLDCRRT: 49.6 SEC (ref 21–32)
APTT BLDCRRT: 51.2 SEC (ref 21–32)
APTT BLDCRRT: 54.5 SEC (ref 21–32)
AST SERPL-CCNC: 57 U/L (ref 10–40)
BASOPHILS # BLD AUTO: 0.09 K/UL (ref 0–0.2)
BASOPHILS NFR BLD: 0.5 % (ref 0–1.9)
BILIRUB DIRECT SERPL-MCNC: 0.7 MG/DL (ref 0.1–0.3)
BILIRUB SERPL-MCNC: 0.9 MG/DL (ref 0.1–1)
BUN SERPL-MCNC: 47 MG/DL (ref 6–20)
BUN SERPL-MCNC: 54 MG/DL (ref 6–20)
BUN SERPL-MCNC: 55 MG/DL (ref 6–20)
BUN SERPL-MCNC: 55 MG/DL (ref 6–20)
CALCIUM SERPL-MCNC: 9.2 MG/DL (ref 8.7–10.5)
CALCIUM SERPL-MCNC: 9.3 MG/DL (ref 8.7–10.5)
CALCIUM SERPL-MCNC: 9.7 MG/DL (ref 8.7–10.5)
CALCIUM SERPL-MCNC: 9.7 MG/DL (ref 8.7–10.5)
CHLORIDE SERPL-SCNC: 102 MMOL/L (ref 95–110)
CHLORIDE SERPL-SCNC: 102 MMOL/L (ref 95–110)
CHLORIDE SERPL-SCNC: 95 MMOL/L (ref 95–110)
CHLORIDE SERPL-SCNC: 98 MMOL/L (ref 95–110)
CO2 SERPL-SCNC: 29 MMOL/L (ref 23–29)
CO2 SERPL-SCNC: 30 MMOL/L (ref 23–29)
CO2 SERPL-SCNC: 34 MMOL/L (ref 23–29)
CO2 SERPL-SCNC: 36 MMOL/L (ref 23–29)
CREAT SERPL-MCNC: 1.4 MG/DL (ref 0.5–1.4)
CREAT SERPL-MCNC: 1.5 MG/DL (ref 0.5–1.4)
CREAT SERPL-MCNC: 1.5 MG/DL (ref 0.5–1.4)
CREAT SERPL-MCNC: 1.6 MG/DL (ref 0.5–1.4)
DELSYS: ABNORMAL
DIFFERENTIAL METHOD: ABNORMAL
EOSINOPHIL # BLD AUTO: 0.8 K/UL (ref 0–0.5)
EOSINOPHIL NFR BLD: 3.8 % (ref 0–8)
ERYTHROCYTE [DISTWIDTH] IN BLOOD BY AUTOMATED COUNT: 19 % (ref 11.5–14.5)
ERYTHROCYTE [SEDIMENTATION RATE] IN BLOOD BY WESTERGREN METHOD: 22 MM/H
EST. GFR  (AFRICAN AMERICAN): 55.2 ML/MIN/1.73 M^2
EST. GFR  (AFRICAN AMERICAN): 59.7 ML/MIN/1.73 M^2
EST. GFR  (AFRICAN AMERICAN): 59.7 ML/MIN/1.73 M^2
EST. GFR  (AFRICAN AMERICAN): >60 ML/MIN/1.73 M^2
EST. GFR  (NON AFRICAN AMERICAN): 47.8 ML/MIN/1.73 M^2
EST. GFR  (NON AFRICAN AMERICAN): 51.7 ML/MIN/1.73 M^2
EST. GFR  (NON AFRICAN AMERICAN): 51.7 ML/MIN/1.73 M^2
EST. GFR  (NON AFRICAN AMERICAN): 56.2 ML/MIN/1.73 M^2
FIO2: 97
FLOW: 3
GLUCOSE SERPL-MCNC: 127 MG/DL (ref 70–110)
GLUCOSE SERPL-MCNC: 127 MG/DL (ref 70–110)
GLUCOSE SERPL-MCNC: 135 MG/DL (ref 70–110)
GLUCOSE SERPL-MCNC: 136 MG/DL (ref 70–110)
HCO3 UR-SCNC: 30.1 MMOL/L (ref 24–28)
HCT VFR BLD AUTO: 28.3 % (ref 40–54)
HGB BLD-MCNC: 8.4 G/DL (ref 14–18)
IMM GRANULOCYTES # BLD AUTO: 0.77 K/UL (ref 0–0.04)
IMM GRANULOCYTES NFR BLD AUTO: 3.9 % (ref 0–0.5)
INR PPP: 1.2 (ref 0.8–1.2)
LDH SERPL L TO P-CCNC: 374 U/L (ref 110–260)
LYMPHOCYTES # BLD AUTO: 2.1 K/UL (ref 1–4.8)
LYMPHOCYTES NFR BLD: 10.4 % (ref 18–48)
MAGNESIUM SERPL-MCNC: 2.2 MG/DL (ref 1.6–2.6)
MAGNESIUM SERPL-MCNC: 2.3 MG/DL (ref 1.6–2.6)
MAGNESIUM SERPL-MCNC: 2.6 MG/DL (ref 1.6–2.6)
MAGNESIUM SERPL-MCNC: 2.8 MG/DL (ref 1.6–2.6)
MCH RBC QN AUTO: 25.4 PG (ref 27–31)
MCHC RBC AUTO-ENTMCNC: 29.7 G/DL (ref 32–36)
MCV RBC AUTO: 86 FL (ref 82–98)
METHEMOGLOBIN: 0.7 % (ref 0–3)
MODE: ABNORMAL
MONOCYTES # BLD AUTO: 1.7 K/UL (ref 0.3–1)
MONOCYTES NFR BLD: 8.4 % (ref 4–15)
NEUTROPHILS # BLD AUTO: 14.5 K/UL (ref 1.8–7.7)
NEUTROPHILS NFR BLD: 73 % (ref 38–73)
NRBC BLD-RTO: 1 /100 WBC
PCO2 BLDA: 44.4 MMHG (ref 35–45)
PH SMN: 7.44 [PH] (ref 7.35–7.45)
PHOSPHATE SERPL-MCNC: 4.2 MG/DL (ref 2.7–4.5)
PLATELET # BLD AUTO: 320 K/UL (ref 150–350)
PMV BLD AUTO: 9.5 FL (ref 9.2–12.9)
PO2 BLDA: 107 MMHG (ref 80–100)
POC BE: 6 MMOL/L
POC SATURATED O2: 98 % (ref 95–100)
POC TCO2: 31 MMOL/L (ref 23–27)
POCT GLUCOSE: 124 MG/DL (ref 70–110)
POCT GLUCOSE: 129 MG/DL (ref 70–110)
POCT GLUCOSE: 134 MG/DL (ref 70–110)
POCT GLUCOSE: 173 MG/DL (ref 70–110)
POTASSIUM SERPL-SCNC: 3.9 MMOL/L (ref 3.5–5.1)
POTASSIUM SERPL-SCNC: 4 MMOL/L (ref 3.5–5.1)
POTASSIUM SERPL-SCNC: 4.3 MMOL/L (ref 3.5–5.1)
POTASSIUM SERPL-SCNC: 4.4 MMOL/L (ref 3.5–5.1)
POTASSIUM SERPL-SCNC: 4.5 MMOL/L (ref 3.5–5.1)
PROT SERPL-MCNC: 7.9 G/DL (ref 6–8.4)
PROTHROMBIN TIME: 11.8 SEC (ref 9–12.5)
RBC # BLD AUTO: 3.31 M/UL (ref 4.6–6.2)
SAMPLE: ABNORMAL
SITE: ABNORMAL
SODIUM SERPL-SCNC: 138 MMOL/L (ref 136–145)
SODIUM SERPL-SCNC: 140 MMOL/L (ref 136–145)
SODIUM SERPL-SCNC: 141 MMOL/L (ref 136–145)
SODIUM SERPL-SCNC: 144 MMOL/L (ref 136–145)
WBC # BLD AUTO: 19.85 K/UL (ref 3.9–12.7)

## 2020-02-13 PROCEDURE — A4216 STERILE WATER/SALINE, 10 ML: HCPCS | Performed by: THORACIC SURGERY (CARDIOTHORACIC VASCULAR SURGERY)

## 2020-02-13 PROCEDURE — 63600367 HC NITRIC OXIDE PER HOUR

## 2020-02-13 PROCEDURE — 97530 THERAPEUTIC ACTIVITIES: CPT

## 2020-02-13 PROCEDURE — 27000248 HC VAD-ADDITIONAL DAY

## 2020-02-13 PROCEDURE — 82803 BLOOD GASES ANY COMBINATION: CPT

## 2020-02-13 PROCEDURE — 80048 BASIC METABOLIC PNL TOTAL CA: CPT

## 2020-02-13 PROCEDURE — 85730 THROMBOPLASTIN TIME PARTIAL: CPT

## 2020-02-13 PROCEDURE — 97535 SELF CARE MNGMENT TRAINING: CPT

## 2020-02-13 PROCEDURE — C9113 INJ PANTOPRAZOLE SODIUM, VIA: HCPCS | Performed by: STUDENT IN AN ORGANIZED HEALTH CARE EDUCATION/TRAINING PROGRAM

## 2020-02-13 PROCEDURE — 83050 HGB METHEMOGLOBIN QUAN: CPT

## 2020-02-13 PROCEDURE — 63600175 PHARM REV CODE 636 W HCPCS: Performed by: STUDENT IN AN ORGANIZED HEALTH CARE EDUCATION/TRAINING PROGRAM

## 2020-02-13 PROCEDURE — 84100 ASSAY OF PHOSPHORUS: CPT

## 2020-02-13 PROCEDURE — 25000003 PHARM REV CODE 250: Performed by: STUDENT IN AN ORGANIZED HEALTH CARE EDUCATION/TRAINING PROGRAM

## 2020-02-13 PROCEDURE — 37799 UNLISTED PX VASCULAR SURGERY: CPT

## 2020-02-13 PROCEDURE — 20000000 HC ICU ROOM

## 2020-02-13 PROCEDURE — 27000221 HC OXYGEN, UP TO 24 HOURS

## 2020-02-13 PROCEDURE — 80076 HEPATIC FUNCTION PANEL: CPT

## 2020-02-13 PROCEDURE — 85730 THROMBOPLASTIN TIME PARTIAL: CPT | Mod: 91

## 2020-02-13 PROCEDURE — 93750 INTERROGATION VAD IN PERSON: CPT | Mod: ,,, | Performed by: INTERNAL MEDICINE

## 2020-02-13 PROCEDURE — 94664 DEMO&/EVAL PT USE INHALER: CPT

## 2020-02-13 PROCEDURE — 93750 PR INTERROGATE VENT ASSIST DEV, IN PERSON, W PHYSICIAN ANALYSIS: ICD-10-PCS | Mod: ,,, | Performed by: INTERNAL MEDICINE

## 2020-02-13 PROCEDURE — 99233 PR SUBSEQUENT HOSPITAL CARE,LEVL III: ICD-10-PCS | Mod: GC,,, | Performed by: SURGERY

## 2020-02-13 PROCEDURE — 85025 COMPLETE CBC W/AUTO DIFF WBC: CPT

## 2020-02-13 PROCEDURE — 80048 BASIC METABOLIC PNL TOTAL CA: CPT | Mod: 91

## 2020-02-13 PROCEDURE — 83615 LACTATE (LD) (LDH) ENZYME: CPT

## 2020-02-13 PROCEDURE — 83735 ASSAY OF MAGNESIUM: CPT

## 2020-02-13 PROCEDURE — 94761 N-INVAS EAR/PLS OXIMETRY MLT: CPT

## 2020-02-13 PROCEDURE — 99233 SBSQ HOSP IP/OBS HIGH 50: CPT | Mod: GC,,, | Performed by: SURGERY

## 2020-02-13 PROCEDURE — 25000003 PHARM REV CODE 250: Performed by: THORACIC SURGERY (CARDIOTHORACIC VASCULAR SURGERY)

## 2020-02-13 PROCEDURE — 97110 THERAPEUTIC EXERCISES: CPT

## 2020-02-13 PROCEDURE — 94799 UNLISTED PULMONARY SVC/PX: CPT

## 2020-02-13 PROCEDURE — 63600175 PHARM REV CODE 636 W HCPCS: Performed by: SURGERY

## 2020-02-13 PROCEDURE — 27000646 HC AEROBIKA DEVICE

## 2020-02-13 PROCEDURE — 99900035 HC TECH TIME PER 15 MIN (STAT)

## 2020-02-13 PROCEDURE — 85610 PROTHROMBIN TIME: CPT

## 2020-02-13 RX ORDER — PANTOPRAZOLE SODIUM 40 MG/1
40 TABLET, DELAYED RELEASE ORAL DAILY
Status: DISCONTINUED | OUTPATIENT
Start: 2020-02-14 | End: 2020-02-27 | Stop reason: HOSPADM

## 2020-02-13 RX ORDER — ACETAMINOPHEN 325 MG/1
650 TABLET ORAL EVERY 6 HOURS
Status: DISCONTINUED | OUTPATIENT
Start: 2020-02-13 | End: 2020-02-18

## 2020-02-13 RX ORDER — HYDRALAZINE HYDROCHLORIDE 25 MG/1
25 TABLET, FILM COATED ORAL EVERY 8 HOURS
Status: DISCONTINUED | OUTPATIENT
Start: 2020-02-13 | End: 2020-02-21

## 2020-02-13 RX ADMIN — DOBUTAMINE IN DEXTROSE 5 MCG/KG/MIN: 200 INJECTION, SOLUTION INTRAVENOUS at 08:02

## 2020-02-13 RX ADMIN — DOBUTAMINE IN DEXTROSE 5 MCG/KG/MIN: 200 INJECTION, SOLUTION INTRAVENOUS at 09:02

## 2020-02-13 RX ADMIN — POTASSIUM CHLORIDE 20 MEQ: 20 SOLUTION ORAL at 08:02

## 2020-02-13 RX ADMIN — AMLODIPINE BESYLATE 10 MG: 10 TABLET ORAL at 08:02

## 2020-02-13 RX ADMIN — EPINEPHRINE 0.02 MCG/KG/MIN: 1 INJECTION INTRAMUSCULAR; INTRAVENOUS; SUBCUTANEOUS at 08:02

## 2020-02-13 RX ADMIN — WARFARIN SODIUM 6 MG: 5 TABLET ORAL at 05:02

## 2020-02-13 RX ADMIN — DIPHENHYDRAMINE HYDROCHLORIDE 12.5 MG: 50 INJECTION, SOLUTION INTRAMUSCULAR; INTRAVENOUS at 10:02

## 2020-02-13 RX ADMIN — OXYCODONE HYDROCHLORIDE 10 MG: 10 TABLET ORAL at 04:02

## 2020-02-13 RX ADMIN — FERROUS GLUCONATE TAB 324 MG (37.5 MG ELEMENTAL IRON) 324 MG: 324 (37.5 FE) TAB at 08:02

## 2020-02-13 RX ADMIN — HYDRALAZINE HYDROCHLORIDE 25 MG: 25 TABLET, FILM COATED ORAL at 01:02

## 2020-02-13 RX ADMIN — CHLOROTHIAZIDE SODIUM 250 MG: 500 INJECTION, POWDER, LYOPHILIZED, FOR SOLUTION INTRAVENOUS at 09:02

## 2020-02-13 RX ADMIN — FUROSEMIDE 20 MG/HR: 10 INJECTION, SOLUTION INTRAMUSCULAR; INTRAVENOUS at 03:02

## 2020-02-13 RX ADMIN — HEPARIN SODIUM AND DEXTROSE 1200 UNITS/HR: 10000; 5 INJECTION INTRAVENOUS at 10:02

## 2020-02-13 RX ADMIN — HEPARIN SODIUM AND DEXTROSE 1200 UNITS/HR: 10000; 5 INJECTION INTRAVENOUS at 04:02

## 2020-02-13 RX ADMIN — Medication 10 ML: at 12:02

## 2020-02-13 RX ADMIN — Medication 10 ML: at 06:02

## 2020-02-13 RX ADMIN — PANTOPRAZOLE SODIUM 40 MG: 40 INJECTION, POWDER, FOR SOLUTION INTRAVENOUS at 08:02

## 2020-02-13 RX ADMIN — ACETAMINOPHEN 650 MG: 325 TABLET ORAL at 12:02

## 2020-02-13 RX ADMIN — DIPHENHYDRAMINE HYDROCHLORIDE 12.5 MG: 50 INJECTION, SOLUTION INTRAMUSCULAR; INTRAVENOUS at 12:02

## 2020-02-13 RX ADMIN — POLYETHYLENE GLYCOL 3350 17 G: 17 POWDER, FOR SOLUTION ORAL at 08:02

## 2020-02-13 RX ADMIN — HYDRALAZINE HYDROCHLORIDE 10 MG: 20 INJECTION INTRAMUSCULAR; INTRAVENOUS at 10:02

## 2020-02-13 RX ADMIN — HYDRALAZINE HYDROCHLORIDE 25 MG: 25 TABLET, FILM COATED ORAL at 10:02

## 2020-02-13 RX ADMIN — ACETAMINOPHEN 650 MG: 325 TABLET ORAL at 05:02

## 2020-02-13 RX ADMIN — FUROSEMIDE 20 MG/HR: 10 INJECTION, SOLUTION INTRAMUSCULAR; INTRAVENOUS at 06:02

## 2020-02-13 RX ADMIN — Medication 10 ML: at 05:02

## 2020-02-13 RX ADMIN — ASPIRIN 325 MG ORAL TABLET 325 MG: 325 PILL ORAL at 08:02

## 2020-02-13 RX ADMIN — Medication 800 MG: at 08:02

## 2020-02-13 NOTE — SUBJECTIVE & OBJECTIVE
Interval History: CVP 16 on lasix gtt and diuril, in chair, eating diet      Continuous Infusions:   sodium chloride 0.9% 10 mL/hr at 02/07/20 1632    dexmedetomidine (PRECEDEX) infusion Stopped (02/10/20 0800)    dextrose 5 % and 0.45 % NaCl with KCl 40 mEq 10 mL/hr at 02/07/20 1632    DOBUTamine 5 mcg/kg/min (02/13/20 1200)    epinephrine 0.02 mcg/kg/min (02/13/20 1200)    furosemide (LASIX) 2 mg/mL continuous infusion (non-titrating) 20 mg/hr (02/13/20 1200)    heparin (porcine) in 5 % dex 1,200 Units/hr (02/13/20 1200)    nicardipine 2.5 mg/hr (02/11/20 0600)    nitric oxide gas       Scheduled Meds:   acetaminophen  650 mg Oral Q6H    amLODIPine  10 mg Oral Daily    aspirin  325 mg Per NG tube Daily    chlorothiazide (DIURIL) IVPB  250 mg Intravenous Q24H    chlorothiazide (DIURIL) IVPB  500 mg Intravenous Once    docusate sodium  100 mg Oral BID    ferrous gluconate  324 mg Oral Daily with breakfast    hydrALAZINE  25 mg Oral Q8H    magnesium oxide  800 mg Oral TID    [START ON 2/14/2020] pantoprazole  40 mg Oral Daily    polyethylene glycol  17 g Oral Daily    potassium chloride 10%  20 mEq Per NG tube BID    sodium chloride 0.9%  10 mL Intravenous Q6H    warfarin  6 mg Oral Once     PRN Meds:albumin human 5%, albuterol sulfate, bisacodyL, Dextrose 10% Bolus, Dextrose 10% Bolus, Dextrose 10% Bolus, Dextrose 10% Bolus, diphenhydrAMINE, hydrALAZINE, magnesium hydroxide 400 mg/5 ml, magnesium sulfate IVPB, potassium chloride 10%, potassium chloride 10%, potassium chloride 10%, potassium chloride 10%, sodium chloride 0.9%, Flushing PICC Protocol **AND** sodium chloride 0.9% **AND** sodium chloride 0.9%    Review of patient's allergies indicates:   Allergen Reactions    Iodine and iodide containing products      Objective:     Vital Signs (Most Recent):  Temp: 98.1 °F (36.7 °C) (02/13/20 1100)  Pulse: (!) 119 (02/13/20 1245)  Resp: (!) 22 (02/11/20 1700)  BP: (!) 90/0 (02/13/20 1100)  SpO2:  100 % (02/13/20 0821) Vital Signs (24h Range):  Temp:  [97.6 °F (36.4 °C)-98.3 °F (36.8 °C)] 98.1 °F (36.7 °C)  Pulse:  [] 119  SpO2:  [98 %-100 %] 100 %  BP: (86-94)/(0) 90/0  Arterial Line BP: ()/(29-93) 99/80     Patient Vitals for the past 72 hrs (Last 3 readings):   Weight   02/13/20 0500 78.6 kg (173 lb 4.5 oz)   02/12/20 1315 96.6 kg (213 lb)   02/12/20 0505 97 kg (213 lb 13.5 oz)     Body mass index is 27.14 kg/m².      Intake/Output Summary (Last 24 hours) at 2/13/2020 1420  Last data filed at 2/13/2020 1200  Gross per 24 hour   Intake 2494.2 ml   Output 1750 ml   Net 744.2 ml       Hemodynamic Parameters:           Physical Exam   Constitutional: He is oriented to person, place, and time. He appears well-developed and well-nourished.   HENT:   Head: Normocephalic and atraumatic.   Cardiovascular: Normal rate and regular rhythm.   LVAD in place  Introducer in place   Pulmonary/Chest: Effort normal. No respiratory distress.   NC   Abdominal: Soft.   Genitourinary:   Genitourinary Comments: Mc cath in place   Neurological: He is alert and oriented to person, place, and time.   Skin: Skin is warm and dry.   Psychiatric: He has a normal mood and affect. His behavior is normal.   Nursing note and vitals reviewed.      Significant Labs:  CBC:  Recent Labs   Lab 02/11/20  0404 02/12/20  0400 02/13/20  0400   WBC 20.42* 18.11* 19.85*   RBC 3.63* 3.51* 3.31*   HGB 9.5* 9.1* 8.4*   HCT 30.3* 29.2* 28.3*    319 320   MCV 84 83 86   MCH 26.2* 25.9* 25.4*   MCHC 31.4* 31.2* 29.7*     BNP:  Recent Labs   Lab 02/10/20 0401 02/12/20 0400   * 1,356*     CMP:  Recent Labs   Lab 02/11/20  0405  02/12/20  0400 02/12/20  2130 02/12/20  2330 02/13/20 0400 02/13/20  0958   *  135*   < > 120*  120*   < > 135*  --  127* 136*   CALCIUM 10.3  10.3   < > 10.2  10.2   < > 9.8  --  9.7 9.7   ALBUMIN 3.0*  --  3.0*  3.0*  --   --   --  2.8*  --    PROT 8.6*  --  8.5*  8.5*  --   --   --  7.9   --      145   < > 147*  147*   < > 144  --  144 141   K 3.8  3.8   < > 4.2  4.2   < > 4.5 4.5 4.3 4.4   CO2 30*  30*   < > 30*  30*   < > 30*  --  29 30*     103   < > 105  105   < > 102  --  102 102   BUN 49*  49*   < > 52*  52*   < > 58*  --  55* 54*   CREATININE 1.2  1.2   < > 1.5*  1.5*   < > 1.5*  --  1.5* 1.6*   ALKPHOS 130  --  127  127  --   --   --  134  --    ALT 34  --  40  40  --   --   --  45*  --    AST 53*  --  58*  58*  --   --   --  57*  --    BILITOT 1.4*  --  1.1*  1.1*  --   --   --  0.9  --     < > = values in this interval not displayed.      Coagulation:   Recent Labs   Lab 02/12/20  0400 02/12/20  0953  02/12/20  2130 02/13/20  0400 02/13/20  0958   INR 1.2 1.2  --   --  1.2  --    APTT 47.3*  47.3* 27.1   < > 52.6* 54.5* 51.2*    < > = values in this interval not displayed.     LDH:  Recent Labs   Lab 02/11/20  0405 02/12/20  0400 02/13/20  0400   * 427* 374*     Microbiology:  Microbiology Results (last 7 days)     Procedure Component Value Units Date/Time    Blood culture [[id number]] Collected:  02/03/20 0946    Order Status:  Completed Specimen:  Blood from Peripheral, Hand, Right Updated:  02/08/20 1022     Blood Culture, Routine No growth after 5 days.    Blood culture [[id number]] Collected:  02/03/20 0946    Order Status:  Completed Specimen:  Blood from Peripheral, Hand, Right Updated:  02/08/20 1022     Blood Culture, Routine No growth after 5 days.          I have reviewed all pertinent labs within the past 24 hours.    Estimated Creatinine Clearance: 48.8 mL/min (A) (based on SCr of 1.6 mg/dL (H)).    Diagnostic Results:  I have reviewed and interpreted all pertinent imaging results/findings within the past 24 hours.

## 2020-02-13 NOTE — SUBJECTIVE & OBJECTIVE
Interval History/Significant Events: NAEON. Urine output decreased this morning. Elevated CVP. Increased Lasix gtt to 20.  Hydralazine for elevated BP.  Continues to have bleeding from driveline site, requiring qShift dressing changes.       Follow-up For: Procedure(s) (LRB):  CLOSURE, WOUND, STERNUM (N/A)  WASHOUT  INSERTION, GRAFT, PERICARDIUM  APPLICATION, WOUND VAC    Post-Operative Day: 6 Days Post-Op    Objective:     Vital Signs (Most Recent):  Temp: 97.6 °F (36.4 °C) (02/13/20 0701)  Pulse: (!) 114 (02/13/20 0821)  Resp: (!) 22 (02/11/20 1700)  BP: (!) 92/0 (02/13/20 0701)  SpO2: 100 % (02/13/20 0821) Vital Signs (24h Range):  Temp:  [97.5 °F (36.4 °C)-98.3 °F (36.8 °C)] 97.6 °F (36.4 °C)  Pulse:  [] 114  SpO2:  [98 %-100 %] 100 %  BP: ()/(0-90) 92/0  Arterial Line BP: ()/(29-93) 101/83     Weight: 78.6 kg (173 lb 4.5 oz)  Body mass index is 27.14 kg/m².      Intake/Output Summary (Last 24 hours) at 2/13/2020 0905  Last data filed at 2/13/2020 0800  Gross per 24 hour   Intake 3074.2 ml   Output 1150 ml   Net 1924.2 ml       Physical Exam   Constitutional: He is oriented to person, place, and time. He appears well-developed and well-nourished.   HENT:   Head: Normocephalic and atraumatic.   Eyes: Pupils are equal, round, and reactive to light. EOM are normal.   Neck: Normal range of motion. Neck supple.   Cardiovascular: Normal rate and regular rhythm.   LVAD in place  Introducer in place   Pulmonary/Chest: Effort normal. No respiratory distress.   Satting well on NC   Abdominal: Soft.   NGT   Genitourinary:   Genitourinary Comments: Mc cath in place   Neurological: He is alert and oriented to person, place, and time.   Skin: Skin is warm and dry.   Psychiatric: He has a normal mood and affect. His behavior is normal.   Nursing note and vitals reviewed.      Vents:  Vent Mode: Spont (02/10/20 0910)  Ventilator Initiated: Yes (02/06/20 1251)  Set Rate: 14 BPM (02/09/20 0723)  Vt Set: 450  mL (02/09/20 0723)  Pressure Support: 10 cmH20 (02/09/20 0906)  PEEP/CPAP: 5 cmH20 (02/10/20 0910)  Oxygen Concentration (%): 98 (02/12/20 2000)  Peak Airway Pressure: 16 cmH2O (02/10/20 0910)  Plateau Pressure: 22 cmH20 (02/10/20 0910)  Total Ve: 11.5 mL (02/10/20 0910)  Negative Inspiratory Force (cm H2O): -39 (02/09/20 0906)  F/VT Ratio<105 (RSBI): (!) 51.57 (02/10/20 0816)    Lines/Drains/Airways     Peripherally Inserted Central Catheter Line                 PICC Triple Lumen 02/08/20 1530 right basilic 4 days          Central Venous Catheter Line                 Percutaneous Central Line Insertion/Assessment - Quad lumen  02/06/20 0742 7 days          Drain                 Urethral Catheter 02/12/20 1620 less than 1 day          Arterial Line                 Arterial Line 02/06/20 0736 Left Other (Comment) 7 days          Line                 VAD 02/06/20 1047 Left ventricular assist device HeartMate 3 6 days          Peripheral Intravenous Line                 Peripheral IV - Single Lumen 02/10/20 1400 18 G Left Wrist 2 days                Significant Labs:    CBC/Anemia Profile:  Recent Labs   Lab 02/12/20  0400 02/13/20  0400   WBC 18.11* 19.85*   HGB 9.1* 8.4*   HCT 29.2* 28.3*    320   MCV 83 86   RDW 18.7* 19.0*        Chemistries:  Recent Labs   Lab 02/12/20  0400 02/12/20  0954 02/12/20  1557 02/12/20  2130 02/12/20  2330 02/13/20  0400   *  147* 147* 147* 144  --  144   K 4.2  4.2 4.5 3.8 4.5 4.5 4.3     105 105 104 102  --  102   CO2 30*  30* 30* 30* 30*  --  29   BUN 52*  52* 54* 55* 58*  --  55*   CREATININE 1.5*  1.5* 1.6* 1.6* 1.5*  --  1.5*   CALCIUM 10.2  10.2 10.3 10.0 9.8  --  9.7   ALBUMIN 3.0*  3.0*  --   --   --   --  2.8*   PROT 8.5*  8.5*  --   --   --   --  7.9   BILITOT 1.1*  1.1*  --   --   --   --  0.9   ALKPHOS 127  127  --   --   --   --  134   ALT 40  40  --   --   --   --  45*   AST 58*  58*  --   --   --   --  57*   MG 2.8*  2.8* 2.8* 2.7* 2.6   --  2.8*   PHOS 3.6 3.8  --   --   --  4.2       ABGs:   Recent Labs   Lab 02/13/20  0434   PH 7.439   PCO2 44.4   HCO3 30.1*   POCSATURATED 98   BE 6     Coagulation:   Recent Labs   Lab 02/13/20  0400   INR 1.2   APTT 54.5*     All pertinent labs within the past 24 hours have been reviewed.    Significant Imaging:  I have reviewed and interpreted all pertinent imaging results/findings within the past 24 hours.   X-ray Chest Ap Portable    Result Date: 2/12/2020  No significant interval change.  See above. Electronically signed by: Nico Costa Date:    02/12/2020 Time:    15:32    X-ray Chest Ap Portable    Result Date: 2/12/2020  No significant detrimental interval change compared to prior exam of 02/11/2020. Electronically signed by: Owen Carbajal MD Date:    02/12/2020 Time:    07:01

## 2020-02-13 NOTE — PLAN OF CARE
Plan of Care Note  Cardiothoracic Surgery    Saba Lott is a 54 y.o. male with heart failure who underwent LVAD placement (2/6/20) and chest closure (2/7/20).    Specific issues: advance diet after BM with golytely; lasix, hep, coumadin, free water,     Plan of care for patient was discussed with ICU staff including nurses, residents, and faculty and appropriate consulting services.    Will continue to monitor patient's hemodynamics, functionality, neuro status, fluid status and renal function, and labs and will adjust medications and fluids as necessary while monitoring appropriateness for de-escalation of support and monitoring and transport to stepdown unit.    Benjy Street MD  Cardiothoracic Surgery Fellow

## 2020-02-13 NOTE — PROGRESS NOTES
02/13/2020  Abelardo Sepulveda    Current provider:  David Joshi MD      I, Abelardo Sepulveda, rounded on Saba Lott to ensure all mechanical assist device settings (IABP or VAD) were appropriate and all parameters were within limits.  I was able to ensure all back up equipment was present, the staff had no issues, and the Perfusion Department daily rounding was complete.    12:16 PM

## 2020-02-13 NOTE — ASSESSMENT & PLAN NOTE
-NICM; Likely Etoh induced  -Transferred from [facility] with IABP at 1:1 for further level of care. IABP removed 1/25 and replaced 2/3.   -S/P HMIII on 2/3.

## 2020-02-13 NOTE — PROGRESS NOTES
"[facility]  Endocrinology  Progress Note    Admit Date: 1/15/2020     Reason for Consult: Management of  Hyperglycemia     Surgical Procedure and Date:    ICD implantation 02/15/2019      HPI:   Patient is a 54 y.o. 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 Chronic MARC - Class II-III and mild LE edema.  Ad[facility] due to severe SOB for a week with associated orthopnea, MARC, and PND unable to lie flat and found to be in acute diastolic heart failure.He was started on CRRT for a few days and tolerated well. Renal u/s was negative for obstruction and liver u/s without findings of cirrhosis. All of this was progression of cardiorenal syndrome with liver congestion from severe volume overload. No dx of DM noted on file. Patient denies history of DM at time of consult. Endocrinology consulted to manage BG [place] [facility]          Lab Results   Component Value Date    HGBA1C 6.6 (H) 01/16/2020       Interval HPI:   Overnight events:   BG is reasonably controlled while on vassopressor therapy and enteral nutrition without use of insulin therapy.   Diet Dysphagia Soft (IDDSI Level 6) Fluid - 2000mL; Nectar Thick  5 Days Post-Op    Eating:   NPO  Nausea: No  Hypoglycemia and intervention: No  Fever: No  TPN and/or TF: TF stopped. Patient given diet.   If yes, type of TF/TPN and rate: none       BP (!) 94/0 (BP Location: Left arm, Patient Position: Lying)   Pulse 104   Temp 97.5 °F (36.4 °C) (Oral)   Resp (!) 22   Ht 5' 7" (1.702 m)   Wt 96.6 kg (213 lb)   SpO2 99%   BMI 33.36 kg/m²      Labs Reviewed and Include    Recent Labs   Lab 02/12/20  0400 02/12/20  1557   *  120* 119*   CALCIUM 10.2  10.2 10.0   ALBUMIN 3.0*  3.0*  --    PROT 8.5*  8.5*  --    *  147* 147*   K 4.2 "  4.2 3.8   CO2 30*  30* 30*     105 104   BUN 52*  52* 55*   CREATININE 1.5*  1.5* 1.6*   ALKPHOS 127  127  --    ALT 40  40  --    AST 58*  58*  --    BILITOT 1.1*  1.1*  --      Lab Results   Component Value Date    WBC 18.11 (H) 02/12/2020    HGB 9.1 (L) 02/12/2020    HCT 29.2 (L) 02/12/2020    MCV 83 02/12/2020     02/12/2020     No results for input(s): TSH, FREET4 in the last 168 hours.  Lab Results   Component Value Date    HGBA1C 6.6 (H) 01/16/2020       Nutritional status:   Body mass index is 33.36 kg/m².  Lab Results   Component Value Date    ALBUMIN 3.0 (L) 02/12/2020    ALBUMIN 3.0 (L) 02/12/2020    ALBUMIN 3.0 (L) 02/11/2020     Lab Results   Component Value Date    PREALBUMIN 14 (L) 02/07/2020    PREALBUMIN 23 01/28/2020    PREALBUMIN 15 (L) 01/22/2020       Estimated Creatinine Clearance: 57.8 mL/min (A) (based on SCr of 1.6 mg/dL (H)).    Accu-Checks  Recent Labs     02/10/20  2006 02/11/20  0003 02/11/20  0406 02/11/20  0758 02/11/20  1211 02/12/20  0004 02/12/20  0359 02/12/20  0826 02/12/20  1001 02/12/20  1556   POCTGLUCOSE 137* 154* 139* 135* 91 114* 126* 120* 142* 108       Current Medications and/or Treatments Impacting Glycemic Control  Immunotherapy:    Immunosuppressants     None        Steroids:   Hormones (From admission, onward)    None        Pressors:    Autonomic Drugs (From admission, onward)    Start     Stop Route Frequency Ordered    02/12/20 1030  EPINEPHrine (ADRENALIN) 5 mg in sodium chloride 0.9% 250 mL infusion     Question Answer Comment   Titrate by: (in mcg/kg/min) 0.12    Titrate interval: (in minutes) 5    Titrate to maintain: (SBP or MAP or Cardiac Index) MAP    Greater than: (in mmHg) 65    Maximum dose: (in mcg/kg/min) 2        -- IV Continuous 02/12/20 1017        Hyperglycemia/Diabetes Medications:   Antihyperglycemics (From admission, onward)    Start     Stop Route Frequency Ordered    02/10/20 1106  insulin aspart U-100 pen 1-10 Units       -- SubQ Every 4 hours PRN 02/10/20 1006          ASSESSMENT and PLAN    * Presence of left ventricular assist device (LVAD)  Managed per primary team  Avoid hypoglycemia        Acute hyperglycemia  SIGN OFF  BG is within or below goal without use of insulin therapy. Patient is also tolerating vasopressor therapy without BG excursions.     Discontinue BG monitoring. Will sign off. Please re-consult Endo as needed.    Patient has no Dx of DM, and is tolerating PO intake without BG excursions and/or complications.     Thank you for the consult.     ** Please call Endocrine for any BG related issues **          Morbid obesity  Body mass index is 33.49 kg/m².  may contribute to insulin resistance            Saad Mayo NP  Endocrinology  [facility]

## 2020-02-13 NOTE — PT/OT/SLP PROGRESS
"Speech Language Pathology Treatment    Patient Name:  Saba Lott   MRN:  [id number]  Admitting Diagnosis: Presence of left ventricular assist device (LVAD)    Recommendations:                 General Recommendations:  Dysphagia therapy  Diet recommendations:  Dental Soft, Liquid Diet Level: Nectar Thick   To achieve nectar-thickened liquids, please use 6 oz of any liquid to 1 packet of thickener. Note: No jello, ice cream or ice.   Aspiration Precautions:   - 1:1 supervision with all PO advised  - HOB to 90 degrees  - Avoid talking while eating and Eliminate distractions  - Feed only when awake/alert  - Small, single bites/sips. NO STRAWS  - Frequent oral care  - Remain upright 30 minutes post meal  - Meds crushed or buried whole in puree per Pt preference  - Strict aspiration precautions and monitor for s/s of aspiration and discontinue oral feeding should you notice any of the following: watery eyes, reddened facial area, wet vocal quality, increased work of breathing, change in respiratory status, increased congestion, coughing, fever and/or mental status change.   General Precautions: Standard, aspiration, fall, LVAD, sternal  Communication strategies:  go to room if call light pushed    Subjective     SLP reviewed Pt with RN, RN explained Pt recently returned to bed following being up in chair this am, denied difficulty with meals or medicines  Pt presents calm  When asked about breakfast, he explains, "I had some eggs and carrots"  He denies pain    Pain/Comfort:  · Pain Rating 1: 0/10  · Pain Addressed 1: Pre-medicate for activity  · Pain Rating Post-Intervention 1: 0/10    Objective:     Has the patient been evaluated by SLP for swallowing?   Yes  Keep patient NPO? No   Current Respiratory Status: nasal cannula    CXR 2/13/2020: No significant detrimental interval change compared to 02/12/2020    Pt seen for dysphagia therapy. He was found awake and reclined in bed withh arterial line, central line, PICC, " Chest Tube (R), nasal canula, and LVAD in place.  RN at bedside. Pt awake and oriented x4. His voice was clear with mildly decreased intensity. He explained he recently ate breakfast. He denied difficulty with meals or medications.   RN at bedside endorsed Pt report. SLP reviewed aspiration precautions, safe swallow strategies, diet modifications and need for ongoing swallow assessment to determine safety/feasibility of PO advancement. Pt verbalized partial understanding and was visibly tired.  RN explained Pt recently transitioned to bed for rest.  SLP explained pending schedule and availability she would return later service day for ongoing assessment with NG now removed.  No questions noted. Whiteboard remained current. Pt remained in bed in position found with RN at bedside upon SLP exit.     Assessment:     Saba Lott is a 54 y.o. male with an SLP diagnosis of dysphagia.  He presents with resolving dysphonia. Current goals remain ongoing. ST to continue to follow.    Goals:   Multidisciplinary Problems     SLP Goals        Problem: SLP Goal    Goal Priority Disciplines Outcome   SLP Goal     SLP Ongoing, Progressing   Description:  Speech Language Pathology Goals  Goals expected to be met by 2/18/2020  1. Pt will participate in ongoing swallow assessment to determine safest, least restrictive diet  2. Educate Pt and family on S/S aspiration and aspiration precautions                 Multidisciplinary Problems (Resolved)        Problem: SLP Goal    Goal Priority Disciplines Outcome   SLP Goal   (Resolved)     SLP Met                   Plan:     · Patient to be seen:  5 x/week   · Plan of Care expires:  03/12/20  · Plan of Care reviewed with:  patient   · SLP Follow-Up:  Yes       Discharge recommendations:  home with home health(pending progress and PT/OT recs)     Time Tracking:     SLP Treatment Date:   02/13/20  Speech Start Time:  1022  Speech Stop Time:  1030     Speech Total Time (min):  8  min    Billable Minutes: Seld Care/Home Management Training 8    LARISSA Doyle., [facility]-SLP  Speech-Language Pathology  Pager: [phone]    02/13/2020

## 2020-02-13 NOTE — NURSING
"Pt lying in bed with no family at bedside. Pt has not starting any of his VAD education, states "therapy keeps working me." I informed pt that if he is not able to learn about his VAD, he will not be able to return home when he is medically ready. Pt verbalized understanding. Pt states that his mother should be here tomorrow and stay through the weekend to learn the dressing change.  "

## 2020-02-13 NOTE — NURSING
Called and informed Dr. Venegas about patient's CVP of 22 and urine output of 80ml this hour after 1 dose of diuril. No new orders at this time. All VSS. Will continue to monitor patient closely.

## 2020-02-13 NOTE — PLAN OF CARE
Problem: SLP Goal  Goal: SLP Goal  Description  Speech Language Pathology Goals  Goals expected to be met by 2/18/2020  1. Pt will participate in ongoing swallow assessment to determine safest, least restrictive diet  2. Educate Pt and family on S/S aspiration and aspiration precautions      Outcome: Ongoing, Progressing     Pt reports good appetite. ST to continue to follow to assess safety/feasibility of PO advancement.     LARISSA Doyle., [facility]-SLP  Speech-Language Pathology  Pager: [phone]  2/13/2020

## 2020-02-13 NOTE — PROGRESS NOTES
[facility]  Critical Care - Surgery  Progress Note    Patient Name: Saba Lott  MRN: [id number]  Admission Date: 1/15/2020  Hospital Length of Stay: 29 days  Code Status: Prior  Attending Provider: David Joshi MD  Primary Care Provider: Primary Doctor No   Principal Problem: Presence of left ventricular assist device (LVAD)    Subjective:     Hospital/ICU Course:  No notes on file    Interval History/Significant Events: NAEON. Urine output decreased this morning. Elevated CVP. Increased Lasix gtt to 20.  Hydralazine for elevated BP.  Continues to have bleeding from driveline site, requiring qShift dressing changes.       Follow-up For: Procedure(s) (LRB):  CLOSURE, WOUND, STERNUM (N/A)  WASHOUT  INSERTION, GRAFT, PERICARDIUM  APPLICATION, WOUND VAC    Post-Operative Day: 6 Days Post-Op    Objective:     Vital Signs (Most Recent):  Temp: 97.6 °F (36.4 °C) (02/13/20 0701)  Pulse: (!) 114 (02/13/20 0821)  Resp: (!) 22 (02/11/20 1700)  BP: (!) 92/0 (02/13/20 0701)  SpO2: 100 % (02/13/20 0821) Vital Signs (24h Range):  Temp:  [97.5 °F (36.4 °C)-98.3 °F (36.8 °C)] 97.6 °F (36.4 °C)  Pulse:  [] 114  SpO2:  [98 %-100 %] 100 %  BP: ()/(0-90) 92/0  Arterial Line BP: ()/(29-93) 101/83     Weight: 78.6 kg (173 lb 4.5 oz)  Body mass index is 27.14 kg/m².      Intake/Output Summary (Last 24 hours) at 2/13/2020 0905  Last data filed at 2/13/2020 0800  Gross per 24 hour   Intake 3074.2 ml   Output 1150 ml   Net 1924.2 ml       Physical Exam   Constitutional: He is oriented to person, place, and time. He appears well-developed and well-nourished.   HENT:   Head: Normocephalic and atraumatic.   Eyes: Pupils are equal, round, and reactive to light. EOM are normal.   Neck: Normal range of motion. Neck supple.   Cardiovascular: Normal rate and regular rhythm.   LVAD in place  Introducer in place   Pulmonary/Chest: Effort normal. No respiratory distress.   Satting well on NC   Abdominal: Soft.    NGT   Genitourinary:   Genitourinary Comments: Mc cath in place   Neurological: He is alert and oriented to person, place, and time.   Skin: Skin is warm and dry.   Psychiatric: He has a normal mood and affect. His behavior is normal.   Nursing note and vitals reviewed.      Vents:  Vent Mode: Spont (02/10/20 0910)  Ventilator Initiated: Yes (02/06/20 1251)  Set Rate: 14 BPM (02/09/20 0723)  Vt Set: 450 mL (02/09/20 0723)  Pressure Support: 10 cmH20 (02/09/20 0906)  PEEP/CPAP: 5 cmH20 (02/10/20 0910)  Oxygen Concentration (%): 98 (02/12/20 2000)  Peak Airway Pressure: 16 cmH2O (02/10/20 0910)  Plateau Pressure: 22 cmH20 (02/10/20 0910)  Total Ve: 11.5 mL (02/10/20 0910)  Negative Inspiratory Force (cm H2O): -39 (02/09/20 0906)  F/VT Ratio<105 (RSBI): (!) 51.57 (02/10/20 0816)    Lines/Drains/Airways     Peripherally Inserted Central Catheter Line                 PICC Triple Lumen 02/08/20 1530 right basilic 4 days          Central Venous Catheter Line                 Percutaneous Central Line Insertion/Assessment - Quad lumen  02/06/20 0742 7 days          Drain                 Urethral Catheter 02/12/20 1620 less than 1 day          Arterial Line                 Arterial Line 02/06/20 0736 Left Other (Comment) 7 days          Line                 VAD 02/06/20 1047 Left ventricular assist device HeartMate 3 6 days          Peripheral Intravenous Line                 Peripheral IV - Single Lumen 02/10/20 1400 18 G Left Wrist 2 days                Significant Labs:    CBC/Anemia Profile:  Recent Labs   Lab 02/12/20  0400 02/13/20  0400   WBC 18.11* 19.85*   HGB 9.1* 8.4*   HCT 29.2* 28.3*    320   MCV 83 86   RDW 18.7* 19.0*        Chemistries:  Recent Labs   Lab 02/12/20  0400 02/12/20  0954 02/12/20  1557 02/12/20  2130 02/12/20  2330 02/13/20  0400   *  147* 147* 147* 144  --  144   K 4.2  4.2 4.5 3.8 4.5 4.5 4.3     105 105 104 102  --  102   CO2 30*  30* 30* 30* 30*  --  29   BUN 52*   52* 54* 55* 58*  --  55*   CREATININE 1.5*  1.5* 1.6* 1.6* 1.5*  --  1.5*   CALCIUM 10.2  10.2 10.3 10.0 9.8  --  9.7   ALBUMIN 3.0*  3.0*  --   --   --   --  2.8*   PROT 8.5*  8.5*  --   --   --   --  7.9   BILITOT 1.1*  1.1*  --   --   --   --  0.9   ALKPHOS 127  127  --   --   --   --  134   ALT 40  40  --   --   --   --  45*   AST 58*  58*  --   --   --   --  57*   MG 2.8*  2.8* 2.8* 2.7* 2.6  --  2.8*   PHOS 3.6 3.8  --   --   --  4.2       ABGs:   Recent Labs   Lab 02/13/20  0434   PH 7.439   PCO2 44.4   HCO3 30.1*   POCSATURATED 98   BE 6     Coagulation:   Recent Labs   Lab 02/13/20  0400   INR 1.2   APTT 54.5*     All pertinent labs within the past 24 hours have been reviewed.    Significant Imaging:  I have reviewed and interpreted all pertinent imaging results/findings within the past 24 hours.   X-ray Chest Ap Portable    Result Date: 2/12/2020  No significant interval change.  See above. Electronically signed by: Nico Costa Date:    02/12/2020 Time:    15:32    X-ray Chest Ap Portable    Result Date: 2/12/2020  No significant detrimental interval change compared to prior exam of 02/11/2020. Electronically signed by: Owen Carbajal MD Date:    02/12/2020 Time:    07:01     Assessment/Plan:     Non-ischemic cardiomyopathy  Neuro:   - Pain control: scheduled tylenol  - Dc'd opioid medications due to worsening confusion today  - No sedation.     Pulmonary:   - Extubated; satting well on NC, continue to wean as tolerated  - Radha; weaning per CTS  - CPT q6hrs  - Daily CXR stable    Cardiac:  - MAP goal >65  - Epi at 0.02 wean per CTS  -  at 5  - Cardene at 2.5  - amlodipine 10 qd  - Increased LVAD speed 5300 and flows to 4.9 per CTS  - LDH trending down  - PICC in place  - Lasix gtt increased to 20  - scheduled diuril 250 daily    Renal:     Intake/Output Summary (Last 24 hours) at 2/13/2020 0623  Last data filed at 2/13/2020 0600  Gross per 24 hour   Intake 3324.2 ml   Output 1310 ml   Net  2014.2 ml   - UOP slightly decreased this AM  - lasix gtt increased to 20   - BUN/ Cr stable at 55/1.5    Fluids/Electrolytes/Nutrition/GI:   -Nutritional status: mechanical soft diet  -bar Mg, K  -NGT  -SLP c/s  - decreased 250 free water flushes to q8hrs  - bowel regimen: Miralax and docusate   - Golytely: 100 q4hrs  - 2 large BM overnight    Hematology/Oncology:  -H/H stable 8.4/28.3 slightly decreased from yesterday   -INR/Plts 1.2/320  -Trend CBC  -coumadin 6 at 5 pm   -hep @ 1200u/hr with PTT goal 45 - 54  -  mg qd    Infectious Disease:   -Afebrile  -Leukocytosis WBC 19.85  -Abx: completed course of vanc and ancef    Endocrine:  -Glucose goal of 120-180  -Insulin gtt per endo    Dispo:  -Continue care in the ICU setting. Wean epi and Radha per CTS.  OOBTC, PT/OT/SLP.          Critical care was time spent personally by me on the following activities: development of treatment plan with patient or surrogate and bedside caregivers, discussions with consultants, evaluation of patient's response to treatment, examination of patient, ordering and performing treatments and interventions, ordering and review of laboratory studies, ordering and review of radiographic studies, pulse oximetry, re-evaluation of patient's condition.  This critical care time did not overlap with that of any other provider or involve time for any procedures.     Michell Betancur, DO  Critical Care - Surgery  [facility]

## 2020-02-13 NOTE — PT/OT/SLP PROGRESS
Physical Therapy      Patient Name:  Saba Lott   MRN:  [id number]    Upon PT arrival, pt working with OT at EOB. Pt had sat up in chair in AM, performed transfers with nursing. Pt currently with bleeding from driveline, per nurse, hold pt from any more OOB activity for now. Plan explained to pt, demo'd understanding. Will follow-up as appropriate according to POC.    Maren Barnes, PT

## 2020-02-13 NOTE — PROGRESS NOTES
[facility]  Heart Transplant  Progress Note    Patient Name: Saba Lott  MRN: [id number]  Admission Date: 1/15/2020  Hospital Length of Stay: 29 days  Attending Physician: David Joshi MD  Primary Care Provider: Primary Doctor No  Principal Problem:Presence of left ventricular assist device (LVAD)    Subjective:     Interval History: CVP 16 on lasix gtt and diuril, in chair, eating diet      Continuous Infusions:   sodium chloride 0.9% 10 mL/hr at 02/07/20 1632    dexmedetomidine (PRECEDEX) infusion Stopped (02/10/20 0800)    dextrose 5 % and 0.45 % NaCl with KCl 40 mEq 10 mL/hr at 02/07/20 1632    DOBUTamine 5 mcg/kg/min (02/13/20 1200)    epinephrine 0.02 mcg/kg/min (02/13/20 1200)    furosemide (LASIX) 2 mg/mL continuous infusion (non-titrating) 20 mg/hr (02/13/20 1200)    heparin (porcine) in 5 % dex 1,200 Units/hr (02/13/20 1200)    nicardipine 2.5 mg/hr (02/11/20 0600)    nitric oxide gas       Scheduled Meds:   acetaminophen  650 mg Oral Q6H    amLODIPine  10 mg Oral Daily    aspirin  325 mg Per NG tube Daily    chlorothiazide (DIURIL) IVPB  250 mg Intravenous Q24H    chlorothiazide (DIURIL) IVPB  500 mg Intravenous Once    docusate sodium  100 mg Oral BID    ferrous gluconate  324 mg Oral Daily with breakfast    hydrALAZINE  25 mg Oral Q8H    magnesium oxide  800 mg Oral TID    [START ON 2/14/2020] pantoprazole  40 mg Oral Daily    polyethylene glycol  17 g Oral Daily    potassium chloride 10%  20 mEq Per NG tube BID    sodium chloride 0.9%  10 mL Intravenous Q6H    warfarin  6 mg Oral Once     PRN Meds:albumin human 5%, albuterol sulfate, bisacodyL, Dextrose 10% Bolus, Dextrose 10% Bolus, Dextrose 10% Bolus, Dextrose 10% Bolus, diphenhydrAMINE, hydrALAZINE, magnesium hydroxide 400 mg/5 ml, magnesium sulfate IVPB, potassium chloride 10%, potassium chloride 10%, potassium chloride 10%, potassium chloride 10%, sodium chloride 0.9%, Flushing PICC Protocol **AND**  sodium chloride 0.9% **AND** sodium chloride 0.9%    Review of patient's allergies indicates:   Allergen Reactions    Iodine and iodide containing products      Objective:     Vital Signs (Most Recent):  Temp: 98.1 °F (36.7 °C) (02/13/20 1100)  Pulse: (!) 119 (02/13/20 1245)  Resp: (!) 22 (02/11/20 1700)  BP: (!) 90/0 (02/13/20 1100)  SpO2: 100 % (02/13/20 0821) Vital Signs (24h Range):  Temp:  [97.6 °F (36.4 °C)-98.3 °F (36.8 °C)] 98.1 °F (36.7 °C)  Pulse:  [] 119  SpO2:  [98 %-100 %] 100 %  BP: (86-94)/(0) 90/0  Arterial Line BP: ()/(29-93) 99/80     Patient Vitals for the past 72 hrs (Last 3 readings):   Weight   02/13/20 0500 78.6 kg (173 lb 4.5 oz)   02/12/20 1315 96.6 kg (213 lb)   02/12/20 0505 97 kg (213 lb 13.5 oz)     Body mass index is 27.14 kg/m².      Intake/Output Summary (Last 24 hours) at 2/13/2020 1420  Last data filed at 2/13/2020 1200  Gross per 24 hour   Intake 2494.2 ml   Output 1750 ml   Net 744.2 ml       Hemodynamic Parameters:           Physical Exam   Constitutional: He is oriented to person, place, and time. He appears well-developed and well-nourished.   HENT:   Head: Normocephalic and atraumatic.   Cardiovascular: Normal rate and regular rhythm.   LVAD in place  Introducer in place   Pulmonary/Chest: Effort normal. No respiratory distress.   NC   Abdominal: Soft.   Genitourinary:   Genitourinary Comments: Mc cath in place   Neurological: He is alert and oriented to person, place, and time.   Skin: Skin is warm and dry.   Psychiatric: He has a normal mood and affect. His behavior is normal.   Nursing note and vitals reviewed.      Significant Labs:  CBC:  Recent Labs   Lab 02/11/20  0404 02/12/20  0400 02/13/20  0400   WBC 20.42* 18.11* 19.85*   RBC 3.63* 3.51* 3.31*   HGB 9.5* 9.1* 8.4*   HCT 30.3* 29.2* 28.3*    319 320   MCV 84 83 86   MCH 26.2* 25.9* 25.4*   MCHC 31.4* 31.2* 29.7*     BNP:  Recent Labs   Lab 02/10/20  0401 02/12/20  0400   * 1,356*      CMP:  Recent Labs   Lab 02/11/20 0405 02/12/20  0400  02/12/20 2130 02/12/20  2330 02/13/20 0400 02/13/20  0958   *  135*   < > 120*  120*   < > 135*  --  127* 136*   CALCIUM 10.3  10.3   < > 10.2  10.2   < > 9.8  --  9.7 9.7   ALBUMIN 3.0*  --  3.0*  3.0*  --   --   --  2.8*  --    PROT 8.6*  --  8.5*  8.5*  --   --   --  7.9  --      145   < > 147*  147*   < > 144  --  144 141   K 3.8  3.8   < > 4.2  4.2   < > 4.5 4.5 4.3 4.4   CO2 30*  30*   < > 30*  30*   < > 30*  --  29 30*     103   < > 105  105   < > 102  --  102 102   BUN 49*  49*   < > 52*  52*   < > 58*  --  55* 54*   CREATININE 1.2  1.2   < > 1.5*  1.5*   < > 1.5*  --  1.5* 1.6*   ALKPHOS 130  --  127  127  --   --   --  134  --    ALT 34  --  40  40  --   --   --  45*  --    AST 53*  --  58*  58*  --   --   --  57*  --    BILITOT 1.4*  --  1.1*  1.1*  --   --   --  0.9  --     < > = values in this interval not displayed.      Coagulation:   Recent Labs   Lab 02/12/20 0400 02/12/20  0953  02/12/20 2130 02/13/20 0400 02/13/20  0958   INR 1.2 1.2  --   --  1.2  --    APTT 47.3*  47.3* 27.1   < > 52.6* 54.5* 51.2*    < > = values in this interval not displayed.     LDH:  Recent Labs   Lab 02/11/20 0405 02/12/20 0400 02/13/20 0400   * 427* 374*     Microbiology:  Microbiology Results (last 7 days)     Procedure Component Value Units Date/Time    Blood culture [[id number]] Collected:  02/03/20 0946    Order Status:  Completed Specimen:  Blood from Peripheral, Hand, Right Updated:  02/08/20 1022     Blood Culture, Routine No growth after 5 days.    Blood culture [[id number]] Collected:  02/03/20 0946    Order Status:  Completed Specimen:  Blood from Peripheral, Hand, Right Updated:  02/08/20 1022     Blood Culture, Routine No growth after 5 days.          I have reviewed all pertinent labs within the past 24 hours.    Estimated Creatinine Clearance: 48.8 mL/min (A) (based on SCr of 1.6 mg/dL  (H)).    Diagnostic Results:  I have reviewed and interpreted all pertinent imaging results/findings within the past 24 hours.    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 Chronic MARC - Class II-III and mild LE edema.      Hospital Course (synopsis of major diagnoses, care, treatment, and services provided during the course of the hospital stay):  -2/12 admit to CDU for diuresis with IV lasix  -IV abx   -CXR with suspected small left pleural effusion with associated left basilar atelectasis versus evolving airspace disease  -2/13 single chamber ICD implant; IV lasix decreased to BID  -2/16 add dobutamine, hold BB due to hypotension, no ACE-I or ARB due to recent HPI hypotension  -2/17 Lasix changed to PO  -2/18 dobutamine discontinued    Admitted to [facility] on Thursday due to severe SOB for a week with associated orthopnea, MARC, and PND unable to lie flat and found to be in acute diastolic heart failure. States he normally weighs around 219 but up to 254lb on admission tonight. Says he hasn't been the most compliant in terms of fluid restriction and daily weights but has avoided salt. BNP on admission was 2375. BUN/CRT 32/1.4 He was started on IV diuretics but continued to deteriorate. Creatinine continued to increase and reached 4.3 with oliguria. He was started on CRRT for a few days and tolerated well. Renal u/s was negative for obstruction and liver u/s without findings of cirrhosis. All of this was progression of cardiorenal syndrome with liver congestion from severe volume overload. On arrival vitals stable and in no acute distress. He has obvious anasarca up to the chest. He did have uop of 500 at time of arrival also.    TTE 5/28/19  · Moderate left ventricular enlargement.  · Severely decreased left  ventricular systolic function. The estimated ejection fraction is 20%  · Global hypokinetic wall motion.  · Grade III (severe) left ventricular diastolic dysfunction consistent with restrictive physiology.  · Severe left atrial enlargement.  · Moderate right ventricular enlargement.  · Low normal right ventricular systolic function.  · Mild right atrial enlargement.  · Moderate mitral regurgitation.  Mild to moderate tricuspid regurgitation    * Presence of left ventricular assist device (LVAD)  -HeartMate 3 Implanted 02/06/2020 as DT. Chest closed 2/7.   -CTS Primary.  -Implanted by Dr. Joshi.  -INR sub therapeutic Coumadin, Goal INR 2.0-3.0. Anticoag per CTS.   -Antiplatelets  mg.  -LDH is stable overall today. Will continue to monitor daily.  -Continues on Epi, , Cardene, Radha.  -CVP elevated. Continue aggressive diuresis.   -Speed set at 5200, LSL 4800 rpm.  -Not listed for OHTx.  Procedure: Device Interrogation Including analysis of device parameters.  Current Settings: Ventricular Assist Device.  Review of device function is stable/unstable stable.    TXP LVAD INTERROGATIONS 2/13/2020 2/13/2020 2/13/2020 2/13/2020 2/13/2020 2/13/2020 2/13/2020   Type HeartMate3 HeartMate3 HeartMate3 HeartMate3 HeartMate3 HeartMate3 HeartMate3   Flow 4.7 4.9 4.8 4.7 4.1 4.4 4.4   Speed 5300 5300 5300 5300 5200 5200 5200   PI 3.2 3.1 3.2 3.9 4.9 4.1 3.9   Power (Centeno) 3.9 4.0 3.8 3.9 3.8 3.8 3.9   LSL 4900 4900 4900 4900 4800 4800 4800   Pulsatility Pulse Pulse Pulse Pulse Pulse Pulse -       Acute hyperglycemia  -HgA1C 6.6  -Endocrine following    Moderate malnutrition  - Boost glucose control added 1/27   - Prealbumin is 23    Morbid obesity  -Weight down considerably since admit with diuresis.    ANJELICA (acute kidney injury)  -Creatinine was 3.0 on admit and got as high as 4.3 at OSH prior to transfer. Renal function was normal 8 months ago.  -Trending down today.     Cardiogenic shock  -NICM; Likely Etoh  induced  -Transferred from [facility] with IABP at 1:1 for further level of care. IABP removed 1/25 and replaced 2/3.   -S/P HMIII on 2/3.     Deep vein thrombosis (DVT) of right lower extremity  -Unsure of timing but was on eliquis PTA.   -Will anticoag with heparin/warfarin post VAD.     AICD (automatic cardioverter/defibrillator) present  -Medtronic single chamber ICD placed 2019 for primary prevention        CONG Selby  Heart Transplant  [facility]

## 2020-02-13 NOTE — ASSESSMENT & PLAN NOTE
-HeartMate 3 Implanted 02/06/2020 as DT. Chest closed 2/7.   -CTS Primary.  -Implanted by Dr. Joshi.  -INR sub therapeutic Coumadin, Goal INR 2.0-3.0. Anticoag per CTS.   -Antiplatelets  mg.  -LDH is stable overall today. Will continue to monitor daily.  -Continues on Epi, , Cardene, Radha.  -CVP elevated. Continue aggressive diuresis.   -Speed set at 5200, LSL 4800 rpm.  -Not listed for OHTx.  Procedure: Device Interrogation Including analysis of device parameters.  Current Settings: Ventricular Assist Device.  Review of device function is stable/unstable stable.    TXP LVAD INTERROGATIONS 2/13/2020 2/13/2020 2/13/2020 2/13/2020 2/13/2020 2/13/2020 2/13/2020   Type HeartMate3 HeartMate3 HeartMate3 HeartMate3 HeartMate3 HeartMate3 HeartMate3   Flow 4.7 4.9 4.8 4.7 4.1 4.4 4.4   Speed 5300 5300 5300 5300 5200 5200 5200   PI 3.2 3.1 3.2 3.9 4.9 4.1 3.9   Power (Centeno) 3.9 4.0 3.8 3.9 3.8 3.8 3.9   LSL 4900 4900 4900 4900 4800 4800 4800   Pulsatility Pulse Pulse Pulse Pulse Pulse Pulse -

## 2020-02-13 NOTE — PLAN OF CARE
Plan of Care Note  Cardiothoracic Surgery    Saba Lott is a 54 y.o. male with heart failure who underwent LVAD placement (2/6/20) and chest closure (2/7/20).    Specific issues: adva diet, likely coumadin 6, monitor driveline site    Plan of care for patient was discussed with ICU staff including nurses, residents, and faculty and appropriate consulting services.    Will continue to monitor patient's hemodynamics, functionality, neuro status, fluid status and renal function, and labs and will adjust medications and fluids as necessary while monitoring appropriateness for de-escalation of support and monitoring and transport to stepdown unit.    Benjy Street MD  Cardiothoracic Surgery Fellow

## 2020-02-13 NOTE — PT/OT/SLP PROGRESS
Occupational Therapy   Treatment    Name: Saba Lott  MRN: [id number]  Admitting Diagnosis:  Presence of left ventricular assist device (LVAD)  6 Days Post-Op    Recommendations:     Discharge Recommendations: home health OT  Discharge Equipment Recommendations:  (TBD)  Barriers to discharge:  None    Assessment:     Saba Lott is a 54 y.o. male with a medical diagnosis of Presence of left ventricular assist device (LVAD).  Performance deficits affecting function are weakness, gait instability, decreased upper extremity function, impaired balance, impaired endurance, impaired self care skills, impaired functional mobilty, decreased safety awareness, impaired cardiopulmonary response to activity, pain.     Rehab Prognosis:  Good; patient would benefit from acute skilled OT services to address these deficits and reach maximum level of function.       Plan:     Patient to be seen 6 x/week to address the above listed problems via self-care/home management, therapeutic activities, therapeutic exercises  · Plan of Care Expires: 03/11/20  · Plan of Care Reviewed with: patient    Subjective     Pain/Comfort:  · Pain Rating 1: 8/10  · Location - Side 1: Bilateral  · Location - Orientation 1: midline  · Location 1: chest  · Pain Addressed 1: Reposition, Distraction  · Pain Rating Post-Intervention 1: 8/10    Objective:     Communicated with: RN prior to session.  Patient found supine with blood pressure cuff, chest tube, arterial line, barton catheter, LVAD, wound vac, telemetry, pulse ox (continuous), PICC line(Radha) upon OT entry to room.    General Precautions: Standard, fall, LVAD, sternal, aspiration, nectar thick   Orthopedic Precautions:N/A   Braces:       Occupational Performance:     Bed Mobility:    · Patient completed Supine to Sit with contact guard assistance  · Patient completed Sit to Supine with contact guard assistance     Functional Mobility/Transfers:  · Patient completed Sit <> Stand Transfer with minimum  assistance  with  no assistive device   · Functional Mobility: Minimal A for sidesteps along EOB    Activities of Daily Living:  · Upper Body Dressing: moderate assistance  Donning back gown  · Lower Body Dressing: maximal assistance donning socks      AMPA 6 Click ADL: 15    Treatment & Education:  Pt ed on OT POC  Pt re-ed on sternal precautions during ADL  Pt re-ed on LVAD components including controller, DL and power cords  Pt re-ed on use of controller pouch and proper placement and securement of controller  Pt ed on reason for and performance of Self Test  Pt sat EOB with SBA while engaged in self-care and ROM ex's  Pt completed  B UE/LE AROM ex's x 10 reps in all planes  Pt returned supine per RN request as pt was OOB in am    Patient left HOB elevated with all lines intact, call button in reach and RN notifiedEducation:      GOALS:   Multidisciplinary Problems     Occupational Therapy Goals        Problem: Occupational Therapy Goal    Goal Priority Disciplines Outcome Interventions   Occupational Therapy Goal     OT, PT/OT Ongoing, Progressing    Description:  Goals to be met by: 2/24/20     Patient will increase functional independence with ADLs by performing:    Feeding with [place].  UE Dressing with Supervision.  LE Dressing with Supervision.  Grooming while standing at sink with Supervision.  Toileting from toilet with Supervision for hygiene and clothing management.   Toilet transfer to toilet with Supervision.  Pt will be (I) with VAD management during ADL.               Multidisciplinary Problems (Resolved)        Problem: Occupational Therapy Goal    Goal Priority Disciplines Outcome Interventions   Occupational Therapy Goal   (Resolved)     OT, PT/OT Met    Description:  Goals to be met by: 7 days 1/30/20     Patient will increase functional independence with ADLs by performing:    Pt to complete UE dressing with set-up-MET  Pt to complete LE dressing with SBA with AE-MET   Pt to complete  toileting with supervision.--MET  Pt to complete standing g/h skills with supervision.--MET  Pt to complete t/f to commode, bed and chair with SBA--MET                         Time Tracking:     OT Date of Treatment: 02/13/20  OT Start Time: 1315  OT Stop Time: 1342  OT Total Time (min): 27 min    Billable Minutes:Therapeutic Activity 17  Therapeutic Exercise 10    KARLY Artis  2/13/2020

## 2020-02-13 NOTE — ASSESSMENT & PLAN NOTE
Neuro:   - Pain control: scheduled tylenol  - Dc'd opioid medications due to worsening confusion today  - No sedation.     Pulmonary:   - Extubated; satting well on NC, continue to wean as tolerated  - Radha; weaning per CTS  - CPT q6hrs  - Daily CXR stable    Cardiac:  - MAP goal >65  - Epi at 0.02 wean per CTS  -  at 5  - Cardene at 2.5  - amlodipine 10 qd  - Increased LVAD speed 5300 and flows to 4.9 per CTS  - LDH trending down  - PICC in place  - Lasix gtt increased to 20  - scheduled diuril 250 daily    Renal:     Intake/Output Summary (Last 24 hours) at 2/13/2020 0623  Last data filed at 2/13/2020 0600  Gross per 24 hour   Intake 3324.2 ml   Output 1310 ml   Net 2014.2 ml   - UOP slightly decreased this AM  - lasix gtt increased to 20   - BUN/ Cr stable at 55/1.5    Fluids/Electrolytes/Nutrition/GI:   -Nutritional status: mechanical soft diet  -bar Mg, K  -NGT  -SLP c/s  - decreased 250 free water flushes to q8hrs  - bowel regimen: Miralax and docusate   - Golytely: 100 q4hrs  - 2 large BM overnight    Hematology/Oncology:  -H/H stable 8.4/28.3 slightly decreased from yesterday   -INR/Plts 1.2/320  -Trend CBC  -coumadin 6 at 5 pm   -hep @ 1200u/hr with PTT goal 45 - 54  -  mg qd    Infectious Disease:   -Afebrile  -Leukocytosis WBC 19.85  -Abx: completed course of vanc and ancef    Endocrine:  -Glucose goal of 120-180  -Insulin gtt per endo    Dispo:  -Continue care in the ICU setting. Wean epi and Radha per CTS.  OOBTC, PT/OT/SLP.

## 2020-02-13 NOTE — PLAN OF CARE
Problem: Occupational Therapy Goal  Goal: Occupational Therapy Goal  Description  Goals to be met by: 2/24/20     Patient will increase functional independence with ADLs by performing:    Feeding with [place].  UE Dressing with Supervision.  LE Dressing with Supervision.  Grooming while standing at sink with Supervision.  Toileting from toilet with Supervision for hygiene and clothing management.   Toilet transfer to toilet with Supervision.  Pt will be (I) with VAD management during ADL.    Outcome: Ongoing, Progressing   The above goals remain appropriate. KARLY Artis  2/13/2020

## 2020-02-13 NOTE — PLAN OF CARE
Pt on 3L NC with 5 ppm Nitric oxide.   O2 > 97%.   HR: 90's-110's, sinus rhythm.   MAP: 80's-90's.   CVP: 20-20-15.   Afebrile.   Gtts: Dobutamine, Epinephrine, Heparin, and Lasix.  UOP:  cc/hr.   Hm3: speed 5200. No VAD alarms overnight. Pt bleeding from driveline site.   Pt assisted in turns. No falls or skin breakdown.   Pt disoriented to time. Pt moves all extremities purposefully and follows commands.   OOBTC this AM.   Plan of care reviewed with pt and family, all questions answered.

## 2020-02-14 LAB
ALBUMIN SERPL BCP-MCNC: 2.7 G/DL (ref 3.5–5.2)
ALBUMIN SERPL BCP-MCNC: 2.7 G/DL (ref 3.5–5.2)
ALP SERPL-CCNC: 121 U/L (ref 55–135)
ALP SERPL-CCNC: 121 U/L (ref 55–135)
ALT SERPL W/O P-5'-P-CCNC: 36 U/L (ref 10–44)
ALT SERPL W/O P-5'-P-CCNC: 36 U/L (ref 10–44)
ANION GAP SERPL CALC-SCNC: 10 MMOL/L (ref 8–16)
ANION GAP SERPL CALC-SCNC: 11 MMOL/L (ref 8–16)
ANION GAP SERPL CALC-SCNC: 9 MMOL/L (ref 8–16)
APTT BLDCRRT: 43.6 SEC (ref 21–32)
APTT BLDCRRT: 45.3 SEC (ref 21–32)
APTT BLDCRRT: 49 SEC (ref 21–32)
APTT BLDCRRT: 49.5 SEC (ref 21–32)
AST SERPL-CCNC: 37 U/L (ref 10–40)
AST SERPL-CCNC: 37 U/L (ref 10–40)
BASOPHILS # BLD AUTO: 0.06 K/UL (ref 0–0.2)
BASOPHILS NFR BLD: 0.3 % (ref 0–1.9)
BILIRUB DIRECT SERPL-MCNC: 0.6 MG/DL (ref 0.1–0.3)
BILIRUB DIRECT SERPL-MCNC: 0.6 MG/DL (ref 0.1–0.3)
BILIRUB SERPL-MCNC: 1 MG/DL (ref 0.1–1)
BILIRUB SERPL-MCNC: 1 MG/DL (ref 0.1–1)
BNP SERPL-MCNC: 808 PG/ML (ref 0–99)
BUN SERPL-MCNC: 37 MG/DL (ref 6–20)
BUN SERPL-MCNC: 40 MG/DL (ref 6–20)
BUN SERPL-MCNC: 40 MG/DL (ref 6–20)
BUN SERPL-MCNC: 42 MG/DL (ref 6–20)
BUN SERPL-MCNC: 42 MG/DL (ref 6–20)
CALCIUM SERPL-MCNC: 8.6 MG/DL (ref 8.7–10.5)
CALCIUM SERPL-MCNC: 9.1 MG/DL (ref 8.7–10.5)
CALCIUM SERPL-MCNC: 9.3 MG/DL (ref 8.7–10.5)
CALCIUM SERPL-MCNC: 9.6 MG/DL (ref 8.7–10.5)
CALCIUM SERPL-MCNC: 9.6 MG/DL (ref 8.7–10.5)
CHLORIDE SERPL-SCNC: 92 MMOL/L (ref 95–110)
CHLORIDE SERPL-SCNC: 97 MMOL/L (ref 95–110)
CHLORIDE SERPL-SCNC: 97 MMOL/L (ref 95–110)
CHLORIDE SERPL-SCNC: 98 MMOL/L (ref 95–110)
CHLORIDE SERPL-SCNC: 98 MMOL/L (ref 95–110)
CO2 SERPL-SCNC: 29 MMOL/L (ref 23–29)
CO2 SERPL-SCNC: 30 MMOL/L (ref 23–29)
CO2 SERPL-SCNC: 33 MMOL/L (ref 23–29)
CO2 SERPL-SCNC: 33 MMOL/L (ref 23–29)
CO2 SERPL-SCNC: 34 MMOL/L (ref 23–29)
CREAT SERPL-MCNC: 1.2 MG/DL (ref 0.5–1.4)
CREAT SERPL-MCNC: 1.2 MG/DL (ref 0.5–1.4)
CREAT SERPL-MCNC: 1.3 MG/DL (ref 0.5–1.4)
CRP SERPL-MCNC: 141.9 MG/L (ref 0–8.2)
DIFFERENTIAL METHOD: ABNORMAL
EOSINOPHIL # BLD AUTO: 0.5 K/UL (ref 0–0.5)
EOSINOPHIL NFR BLD: 2.9 % (ref 0–8)
ERYTHROCYTE [DISTWIDTH] IN BLOOD BY AUTOMATED COUNT: 19 % (ref 11.5–14.5)
EST. GFR  (AFRICAN AMERICAN): >60 ML/MIN/1.73 M^2
EST. GFR  (NON AFRICAN AMERICAN): >60 ML/MIN/1.73 M^2
GLUCOSE SERPL-MCNC: 119 MG/DL (ref 70–110)
GLUCOSE SERPL-MCNC: 119 MG/DL (ref 70–110)
GLUCOSE SERPL-MCNC: 140 MG/DL (ref 70–110)
GLUCOSE SERPL-MCNC: 146 MG/DL (ref 70–110)
GLUCOSE SERPL-MCNC: 160 MG/DL (ref 70–110)
HCT VFR BLD AUTO: 25.7 % (ref 40–54)
HGB BLD-MCNC: 7.9 G/DL (ref 14–18)
IMM GRANULOCYTES # BLD AUTO: 0.7 K/UL (ref 0–0.04)
IMM GRANULOCYTES NFR BLD AUTO: 3.9 % (ref 0–0.5)
INR PPP: 1.3 (ref 0.8–1.2)
LDH SERPL L TO P-CCNC: 433 U/L (ref 110–260)
LYMPHOCYTES # BLD AUTO: 2.2 K/UL (ref 1–4.8)
LYMPHOCYTES NFR BLD: 11.9 % (ref 18–48)
MAGNESIUM SERPL-MCNC: 1.9 MG/DL (ref 1.6–2.6)
MAGNESIUM SERPL-MCNC: 2 MG/DL (ref 1.6–2.6)
MAGNESIUM SERPL-MCNC: 2.2 MG/DL (ref 1.6–2.6)
MAGNESIUM SERPL-MCNC: 2.3 MG/DL (ref 1.6–2.6)
MCH RBC QN AUTO: 25.6 PG (ref 27–31)
MCHC RBC AUTO-ENTMCNC: 30.7 G/DL (ref 32–36)
MCV RBC AUTO: 83 FL (ref 82–98)
METHEMOGLOBIN: 0.7 % (ref 0–3)
MONOCYTES # BLD AUTO: 1.2 K/UL (ref 0.3–1)
MONOCYTES NFR BLD: 6.8 % (ref 4–15)
NEUTROPHILS # BLD AUTO: 13.5 K/UL (ref 1.8–7.7)
NEUTROPHILS NFR BLD: 74.2 % (ref 38–73)
NRBC BLD-RTO: 1 /100 WBC
PHOSPHATE SERPL-MCNC: 3.7 MG/DL (ref 2.7–4.5)
PLATELET # BLD AUTO: 309 K/UL (ref 150–350)
PMV BLD AUTO: 9.4 FL (ref 9.2–12.9)
POTASSIUM SERPL-SCNC: 3.7 MMOL/L (ref 3.5–5.1)
POTASSIUM SERPL-SCNC: 3.9 MMOL/L (ref 3.5–5.1)
POTASSIUM SERPL-SCNC: 3.9 MMOL/L (ref 3.5–5.1)
POTASSIUM SERPL-SCNC: 4 MMOL/L (ref 3.5–5.1)
POTASSIUM SERPL-SCNC: 4.1 MMOL/L (ref 3.5–5.1)
POTASSIUM SERPL-SCNC: 4.1 MMOL/L (ref 3.5–5.1)
POTASSIUM SERPL-SCNC: 4.3 MMOL/L (ref 3.5–5.1)
PROT SERPL-MCNC: 7.9 G/DL (ref 6–8.4)
PROT SERPL-MCNC: 7.9 G/DL (ref 6–8.4)
PROTHROMBIN TIME: 12.6 SEC (ref 9–12.5)
RBC # BLD AUTO: 3.08 M/UL (ref 4.6–6.2)
SODIUM SERPL-SCNC: 135 MMOL/L (ref 136–145)
SODIUM SERPL-SCNC: 138 MMOL/L (ref 136–145)
SODIUM SERPL-SCNC: 138 MMOL/L (ref 136–145)
SODIUM SERPL-SCNC: 140 MMOL/L (ref 136–145)
SODIUM SERPL-SCNC: 140 MMOL/L (ref 136–145)
WBC # BLD AUTO: 18.17 K/UL (ref 3.9–12.7)

## 2020-02-14 PROCEDURE — 27000221 HC OXYGEN, UP TO 24 HOURS

## 2020-02-14 PROCEDURE — 25000003 PHARM REV CODE 250: Performed by: THORACIC SURGERY (CARDIOTHORACIC VASCULAR SURGERY)

## 2020-02-14 PROCEDURE — 84132 ASSAY OF SERUM POTASSIUM: CPT

## 2020-02-14 PROCEDURE — 92526 ORAL FUNCTION THERAPY: CPT

## 2020-02-14 PROCEDURE — 63600175 PHARM REV CODE 636 W HCPCS: Performed by: SURGERY

## 2020-02-14 PROCEDURE — 27000248 HC VAD-ADDITIONAL DAY

## 2020-02-14 PROCEDURE — 85610 PROTHROMBIN TIME: CPT

## 2020-02-14 PROCEDURE — 86140 C-REACTIVE PROTEIN: CPT

## 2020-02-14 PROCEDURE — 97535 SELF CARE MNGMENT TRAINING: CPT

## 2020-02-14 PROCEDURE — 25000003 PHARM REV CODE 250: Performed by: STUDENT IN AN ORGANIZED HEALTH CARE EDUCATION/TRAINING PROGRAM

## 2020-02-14 PROCEDURE — 83735 ASSAY OF MAGNESIUM: CPT | Mod: 91

## 2020-02-14 PROCEDURE — 85730 THROMBOPLASTIN TIME PARTIAL: CPT

## 2020-02-14 PROCEDURE — 83615 LACTATE (LD) (LDH) ENZYME: CPT

## 2020-02-14 PROCEDURE — 93750 INTERROGATION VAD IN PERSON: CPT | Mod: ,,, | Performed by: INTERNAL MEDICINE

## 2020-02-14 PROCEDURE — 63600367 HC NITRIC OXIDE PER HOUR

## 2020-02-14 PROCEDURE — 99900035 HC TECH TIME PER 15 MIN (STAT)

## 2020-02-14 PROCEDURE — A4216 STERILE WATER/SALINE, 10 ML: HCPCS | Performed by: THORACIC SURGERY (CARDIOTHORACIC VASCULAR SURGERY)

## 2020-02-14 PROCEDURE — 20000000 HC ICU ROOM

## 2020-02-14 PROCEDURE — 80076 HEPATIC FUNCTION PANEL: CPT

## 2020-02-14 PROCEDURE — 80048 BASIC METABOLIC PNL TOTAL CA: CPT | Mod: 91

## 2020-02-14 PROCEDURE — 94761 N-INVAS EAR/PLS OXIMETRY MLT: CPT

## 2020-02-14 PROCEDURE — 63600175 PHARM REV CODE 636 W HCPCS: Performed by: STUDENT IN AN ORGANIZED HEALTH CARE EDUCATION/TRAINING PROGRAM

## 2020-02-14 PROCEDURE — 83880 ASSAY OF NATRIURETIC PEPTIDE: CPT

## 2020-02-14 PROCEDURE — 99233 PR SUBSEQUENT HOSPITAL CARE,LEVL III: ICD-10-PCS | Mod: GC,,, | Performed by: SURGERY

## 2020-02-14 PROCEDURE — 83735 ASSAY OF MAGNESIUM: CPT

## 2020-02-14 PROCEDURE — 93750 PR INTERROGATE VENT ASSIST DEV, IN PERSON, W PHYSICIAN ANALYSIS: ICD-10-PCS | Mod: ,,, | Performed by: INTERNAL MEDICINE

## 2020-02-14 PROCEDURE — 85730 THROMBOPLASTIN TIME PARTIAL: CPT | Mod: 91

## 2020-02-14 PROCEDURE — 97530 THERAPEUTIC ACTIVITIES: CPT

## 2020-02-14 PROCEDURE — 84100 ASSAY OF PHOSPHORUS: CPT

## 2020-02-14 PROCEDURE — 97116 GAIT TRAINING THERAPY: CPT

## 2020-02-14 PROCEDURE — 99233 SBSQ HOSP IP/OBS HIGH 50: CPT | Mod: GC,,, | Performed by: SURGERY

## 2020-02-14 PROCEDURE — 37799 UNLISTED PX VASCULAR SURGERY: CPT

## 2020-02-14 PROCEDURE — 83050 HGB METHEMOGLOBIN QUAN: CPT

## 2020-02-14 PROCEDURE — 80048 BASIC METABOLIC PNL TOTAL CA: CPT

## 2020-02-14 PROCEDURE — 85025 COMPLETE CBC W/AUTO DIFF WBC: CPT

## 2020-02-14 PROCEDURE — 97803 MED NUTRITION INDIV SUBSEQ: CPT

## 2020-02-14 RX ORDER — OXYCODONE HYDROCHLORIDE 5 MG/1
10 TABLET ORAL EVERY 4 HOURS PRN
Status: DISCONTINUED | OUTPATIENT
Start: 2020-02-14 | End: 2020-02-27 | Stop reason: HOSPADM

## 2020-02-14 RX ORDER — OXYCODONE HYDROCHLORIDE 5 MG/1
5 TABLET ORAL EVERY 4 HOURS PRN
Status: DISCONTINUED | OUTPATIENT
Start: 2020-02-14 | End: 2020-02-27 | Stop reason: HOSPADM

## 2020-02-14 RX ADMIN — Medication 10 ML: at 05:02

## 2020-02-14 RX ADMIN — Medication 10 ML: at 12:02

## 2020-02-14 RX ADMIN — FUROSEMIDE 20 MG/HR: 10 INJECTION, SOLUTION INTRAMUSCULAR; INTRAVENOUS at 04:02

## 2020-02-14 RX ADMIN — HYDRALAZINE HYDROCHLORIDE 25 MG: 25 TABLET, FILM COATED ORAL at 10:02

## 2020-02-14 RX ADMIN — ACETAMINOPHEN 650 MG: 325 TABLET ORAL at 12:02

## 2020-02-14 RX ADMIN — POTASSIUM CHLORIDE 40 MEQ: 20 SOLUTION ORAL at 06:02

## 2020-02-14 RX ADMIN — POTASSIUM CHLORIDE 20 MEQ: 20 SOLUTION ORAL at 09:02

## 2020-02-14 RX ADMIN — MAGNESIUM SULFATE HEPTAHYDRATE 2 G: 40 INJECTION, SOLUTION INTRAVENOUS at 06:02

## 2020-02-14 RX ADMIN — OXYCODONE HYDROCHLORIDE 10 MG: 10 TABLET ORAL at 08:02

## 2020-02-14 RX ADMIN — FUROSEMIDE 20 MG/HR: 10 INJECTION, SOLUTION INTRAMUSCULAR; INTRAVENOUS at 03:02

## 2020-02-14 RX ADMIN — DOCUSATE SODIUM 100 MG: 100 CAPSULE, LIQUID FILLED ORAL at 09:02

## 2020-02-14 RX ADMIN — WARFARIN SODIUM 6 MG: 5 TABLET ORAL at 05:02

## 2020-02-14 RX ADMIN — CHLOROTHIAZIDE SODIUM 250 MG: 500 INJECTION, POWDER, LYOPHILIZED, FOR SOLUTION INTRAVENOUS at 09:02

## 2020-02-14 RX ADMIN — DOBUTAMINE IN DEXTROSE 5 MCG/KG/MIN: 200 INJECTION, SOLUTION INTRAVENOUS at 02:02

## 2020-02-14 RX ADMIN — HEPARIN SODIUM AND DEXTROSE 1200 UNITS/HR: 10000; 5 INJECTION INTRAVENOUS at 09:02

## 2020-02-14 RX ADMIN — DOCUSATE SODIUM 100 MG: 100 CAPSULE, LIQUID FILLED ORAL at 08:02

## 2020-02-14 RX ADMIN — HYDRALAZINE HYDROCHLORIDE 25 MG: 25 TABLET, FILM COATED ORAL at 05:02

## 2020-02-14 RX ADMIN — POTASSIUM CHLORIDE 40 MEQ: 20 SOLUTION ORAL at 05:02

## 2020-02-14 RX ADMIN — Medication 800 MG: at 09:02

## 2020-02-14 RX ADMIN — HYDRALAZINE HYDROCHLORIDE 25 MG: 25 TABLET, FILM COATED ORAL at 03:02

## 2020-02-14 RX ADMIN — FERROUS GLUCONATE TAB 324 MG (37.5 MG ELEMENTAL IRON) 324 MG: 324 (37.5 FE) TAB at 08:02

## 2020-02-14 RX ADMIN — PANTOPRAZOLE SODIUM 40 MG: 40 TABLET, DELAYED RELEASE ORAL at 09:02

## 2020-02-14 RX ADMIN — AMLODIPINE BESYLATE 10 MG: 10 TABLET ORAL at 09:02

## 2020-02-14 RX ADMIN — ASPIRIN 325 MG ORAL TABLET 325 MG: 325 PILL ORAL at 09:02

## 2020-02-14 RX ADMIN — POTASSIUM CHLORIDE 20 MEQ: 20 SOLUTION ORAL at 08:02

## 2020-02-14 RX ADMIN — ACETAMINOPHEN 325 MG: 325 TABLET ORAL at 11:02

## 2020-02-14 RX ADMIN — Medication 800 MG: at 08:02

## 2020-02-14 RX ADMIN — ACETAMINOPHEN 650 MG: 325 TABLET ORAL at 05:02

## 2020-02-14 RX ADMIN — Medication 10 ML: at 11:02

## 2020-02-14 RX ADMIN — POLYETHYLENE GLYCOL 3350 17 G: 17 POWDER, FOR SOLUTION ORAL at 09:02

## 2020-02-14 RX ADMIN — Medication 800 MG: at 03:02

## 2020-02-14 NOTE — PROGRESS NOTES
02/14/2020  Miko Rivera    Current provider:  David Joshi MD      I, Miko Rivera, rounded on Saba Lott to ensure all mechanical assist device settings (IABP or VAD) were appropriate and all parameters were within limits.  I was able to ensure all back up equipment was present, the staff had no issues, and the Perfusion Department daily rounding was complete.    7:54 AM

## 2020-02-14 NOTE — PLAN OF CARE
Plan of Care Note  Cardiothoracic Surgery    Saba Lott is a 54 y.o. male with heart failure who underwent LVAD placement (2/6/20) and chest closure (2/7/20).    Specific issues: slow epi wean, hving BMs- d/c golytely; d/c CVP measurements via PICC, coum 6 (4-6), OOB 3-4 hrs    Plan of care for patient was discussed with ICU staff including nurses, residents, and faculty and appropriate consulting services.    Will continue to monitor patient's hemodynamics, functionality, neuro status, fluid status and renal function, and labs and will adjust medications and fluids as necessary while monitoring appropriateness for de-escalation of support and monitoring and transport to stepdown unit.    Benjy Street MD  Cardiothoracic Surgery Fellow

## 2020-02-14 NOTE — PT/OT/SLP PROGRESS
"Speech Language Pathology Treatment    Patient Name:  Saba Lott   MRN:  [id number]  Admitting Diagnosis: Presence of left ventricular assist device (LVAD)    Recommendations:                 General Recommendations:  Dysphagia therapy  Diet recommendations:  Dental Soft, Liquid Diet Level: Nectar Thick   Aspiration Precautions: 1 bite/sip at a time, Avoid talking while eating, Frequent oral care, HOB to 90 degrees, Meds whole 1 at a time, No straws, Small bites/sips and Strict aspiration precautions   General Precautions: Standard, aspiration, fall, sternal, LVAD  Communication strategies:  go to room if call light pushed    Subjective     SLP reviewed Pt with RN, RN reported Pt tolerating meals, increased productive cough since working with PT/OT    Pt presents calm, cooperative   He explains, "felt good to walk but I'm hurting today"    Pain/Comfort:  · Pain Rating 1: 7/10  · Location - Orientation 1: generalized  · Location 1: chest  · Pain Addressed 1: Pre-medicate for activity, Cessation of Activity, Distraction, Nurse notified  · Pain Rating Post-Intervention 1: 7/10    Objective:     Has the patient been evaluated by SLP for swallowing?   Yes  Keep patient NPO? No   Current Respiratory Status: nasal cannula    CXR 2/14/2020: No significant detrimental interval change compared to prior exam of 02/13/2020.    Pt seen for dysphagia therapy. He was found awake in bed with arterial line, central line, PICC, peripheral IV, catheter, LVAD and nasal canula (4L NC, 1.5 ppm Nitric) in place. Pt visibly grimaced in pain as he coughed and squeezed pillow to chest upon SLP entrance to room.  RN in room to review Pt c/o pain and elevated HOB.  Pt reported chest pain and endorsed increased coughing following PT/OT session.  Pt denied difficulty with meals, denied coughing with meals and endorsed good appetite.  He expressed frustration with coughing and explained he felt increased pain today with persistent coughing. Pt " with persistent coughing and SLP provided suction via [place].  RN aware.  Pt accepted trials of thin liquids via cup edge x1 and ice chips x2 then asked if he could be reclined in bed to rest 2/2 pain. Pt with persistent coughing with trials of thin liquids and SLP unable to r/o aspiration with trials 2/2 persistent coughing and minimal acceptance. He was educated on ongoing SLP POC, safe swallow strategies, aspiration precautions and thickener guidelines. No questions noted. Whiteboard current. RN entered room to review IV as SLP discontinued session. Pt remained in bed with RN at bedside upon SLP exit from room. Call light in reach.     Assessment:     Saba Lott is a 54 y.o. male with an SLP diagnosis of resolving dysphonia. .  He presents with persistent coughing and increased pain this service day. Risk of aspiration remains 2/2 fatigue, Strict aspiration precautions advised. ST to continue to follow.     Goals:   Multidisciplinary Problems     SLP Goals        Problem: SLP Goal    Goal Priority Disciplines Outcome   SLP Goal     SLP Ongoing, Progressing   Description:  Speech Language Pathology Goals  Goals expected to be met by 2/18/2020  1. Pt will participate in ongoing swallow assessment to determine safest, least restrictive diet  2. Educate Pt and family on S/S aspiration and aspiration precautions                 Multidisciplinary Problems (Resolved)        Problem: SLP Goal    Goal Priority Disciplines Outcome   SLP Goal   (Resolved)     SLP Met                   Plan:     · Patient to be seen:  5 x/week   · Plan of Care expires:  03/12/20  · Plan of Care reviewed with:  patient   · SLP Follow-Up:  Yes       Discharge recommendations:  (pending PT/OT recs, no additional ST needs anticipated following d/c from acute )     Time Tracking:     SLP Treatment Date:   02/14/20  Speech Start Time:  1410  Speech Stop Time:  1427     Speech Total Time (min):  17 min    Billable Minutes: Treatment Swallowing  Dysfunction 8 and Self Care/Home Management Training 9    LARISSA Doyle., [facility]-SLP  Speech-Language Pathology  Pager: [phone]  02/14/2020

## 2020-02-14 NOTE — PLAN OF CARE
Problem: SLP Goal  Goal: SLP Goal  Description  Speech Language Pathology Goals  Goals expected to be met by 2/18/2020  1. Pt will participate in ongoing swallow assessment to determine safest, least restrictive diet  2. Educate Pt and family on S/S aspiration and aspiration precautions      Outcome: Ongoing, Progressing     Pt seen for dysphagia therapy. Pt with increased, productive cough this service day prior to, and during trials, and SLP unable to r/o aspiration with trials of thin liquids. Recommend continue current diet with strict aspiration precautions and ST to follow up to reassess safety/feasibility of advancement to thin liquids.     LARISSA Doyle., [facility]-SLP  Speech-Language Pathology  Pager: [phone]  .2/14/2020
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
## 2020-02-14 NOTE — SUBJECTIVE & OBJECTIVE
Interval History: No complaints this am.     Continuous Infusions:   sodium chloride 0.9% 10 mL/hr at 02/07/20 1632    dexmedetomidine (PRECEDEX) infusion Stopped (02/10/20 0800)    dextrose 5 % and 0.45 % NaCl with KCl 40 mEq 10 mL/hr at 02/07/20 1632    DOBUTamine 5 mcg/kg/min (02/14/20 0700)    epinephrine 0.02 mcg/kg/min (02/14/20 0700)    furosemide (LASIX) 2 mg/mL continuous infusion (non-titrating) 20 mg/hr (02/14/20 0700)    heparin (porcine) in 5 % dex 1,200 Units/hr (02/14/20 0700)    nicardipine 2.5 mg/hr (02/11/20 0600)    nitric oxide gas       Scheduled Meds:   acetaminophen  650 mg Oral Q6H    amLODIPine  10 mg Oral Daily    aspirin  325 mg Per NG tube Daily    chlorothiazide (DIURIL) IVPB  250 mg Intravenous Q24H    chlorothiazide (DIURIL) IVPB  500 mg Intravenous Once    docusate sodium  100 mg Oral BID    ferrous gluconate  324 mg Oral Daily with breakfast    hydrALAZINE  25 mg Oral Q8H    magnesium oxide  800 mg Oral TID    pantoprazole  40 mg Oral Daily    polyethylene glycol  17 g Oral Daily    potassium chloride 10%  20 mEq Per NG tube BID    sodium chloride 0.9%  10 mL Intravenous Q6H     PRN Meds:albumin human 5%, albuterol sulfate, bisacodyL, Dextrose 10% Bolus, Dextrose 10% Bolus, Dextrose 10% Bolus, Dextrose 10% Bolus, diphenhydrAMINE, hydrALAZINE, magnesium hydroxide 400 mg/5 ml, magnesium sulfate IVPB, potassium chloride 10%, potassium chloride 10%, potassium chloride 10%, potassium chloride 10%, sodium chloride 0.9%, Flushing PICC Protocol **AND** sodium chloride 0.9% **AND** sodium chloride 0.9%    Review of patient's allergies indicates:   Allergen Reactions    Iodine and iodide containing products      Objective:     Vital Signs (Most Recent):  Temp: 98.4 °F (36.9 °C) (02/14/20 0700)  Pulse: 104 (02/14/20 0600)  Resp: 18 (02/14/20 0700)  BP: (!) 86/0 (02/14/20 0730)  SpO2: 98 % (02/14/20 0700) Vital Signs (24h Range):  Temp:  [98.1 °F (36.7 °C)-98.7 °F (37.1  °C)] 98.4 °F (36.9 °C)  Pulse:  [100-120] 104  Resp:  [18] 18  SpO2:  [96 %-98 %] 98 %  BP: ()/(0-79) 86/0  Arterial Line BP: ()/(64-84) 92/75     Patient Vitals for the past 72 hrs (Last 3 readings):   Weight   02/14/20 0300 75 kg (165 lb 5.5 oz)   02/13/20 0500 78.6 kg (173 lb 4.5 oz)   02/12/20 1315 96.6 kg (213 lb)     Body mass index is 25.9 kg/m².      Intake/Output Summary (Last 24 hours) at 2/14/2020 0823  Last data filed at 2/14/2020 0700  Gross per 24 hour   Intake 1830 ml   Output 3940 ml   Net -2110 ml       Hemodynamic Parameters: CVP 16       Physical Exam   Constitutional: He is oriented to person, place, and time. He appears well-developed and well-nourished.   HENT:   Head: Normocephalic and atraumatic.   Cardiovascular: Normal rate and regular rhythm.   LVAD in place  Introducer in place   Pulmonary/Chest: Effort normal. No respiratory distress.   NC   Abdominal: Soft.   Genitourinary:   Genitourinary Comments: Mc cath in place   Neurological: He is alert and oriented to person, place, and time.   Skin: Skin is warm and dry.   Psychiatric: He has a normal mood and affect. His behavior is normal.   Nursing note and vitals reviewed.    Significant Labs:  CBC:  Recent Labs   Lab 02/12/20  0400 02/13/20  0400 02/14/20  0400   WBC 18.11* 19.85* 18.17*   RBC 3.51* 3.31* 3.08*   HGB 9.1* 8.4* 7.9*   HCT 29.2* 28.3* 25.7*    320 309   MCV 83 86 83   MCH 25.9* 25.4* 25.6*   MCHC 31.2* 29.7* 30.7*     BNP:  Recent Labs   Lab 02/10/20  0401 02/12/20  0400 02/14/20  0400   * 1,356* 808*     CMP:  Recent Labs   Lab 02/12/20  0400 02/13/20  0400  02/13/20  1602 02/13/20  2200 02/14/20  0001 02/14/20  0400   *  120*   < > 127*   < > 135* 127*  --  119*  119*   CALCIUM 10.2  10.2   < > 9.7   < > 9.3 9.2  --  9.6  9.6   ALBUMIN 3.0*  3.0*  --  2.8*  --   --   --   --  2.7*  2.7*   PROT 8.5*  8.5*  --  7.9  --   --   --   --  7.9  7.9   *  147*   < > 144   < > 140  138  --  140  140   K 4.2  4.2   < > 4.3   < > 4.0 3.9 4.0 3.7  3.7  3.7   CO2 30*  30*   < > 29   < > 34* 36*  --  33*  33*     105   < > 102   < > 98 95  --  97  97   BUN 52*  52*   < > 55*   < > 55* 47*  --  42*  42*   CREATININE 1.5*  1.5*   < > 1.5*   < > 1.5* 1.4  --  1.3  1.3   ALKPHOS 127  127  --  134  --   --   --   --  121  121   ALT 40  40  --  45*  --   --   --   --  36  36   AST 58*  58*  --  57*  --   --   --   --  37  37   BILITOT 1.1*  1.1*  --  0.9  --   --   --   --  1.0  1.0    < > = values in this interval not displayed.      Coagulation:   Recent Labs   Lab 02/12/20  0953  02/13/20  0400  02/13/20  1602 02/13/20  2200 02/14/20  0400   INR 1.2  --  1.2  --   --   --  1.3*   APTT 27.1   < > 54.5*   < > 49.6* 49.6* 49.0*  49.0*  49.0*    < > = values in this interval not displayed.     LDH:  Recent Labs   Lab 02/12/20  0400 02/13/20  0400 02/14/20  0400   * 374* 433*     Microbiology:  Microbiology Results (last 7 days)     Procedure Component Value Units Date/Time    Blood culture [[id number]] Collected:  02/03/20 0946    Order Status:  Completed Specimen:  Blood from Peripheral, Hand, Right Updated:  02/08/20 1022     Blood Culture, Routine No growth after 5 days.    Blood culture [[id number]] Collected:  02/03/20 0946    Order Status:  Completed Specimen:  Blood from Peripheral, Hand, Right Updated:  02/08/20 1022     Blood Culture, Routine No growth after 5 days.        I have reviewed all pertinent labs within the past 24 hours.    Estimated Creatinine Clearance: 60 mL/min (based on SCr of 1.3 mg/dL).    Diagnostic Results:  I have reviewed and interpreted all pertinent imaging results/findings within the past 24 hours.

## 2020-02-14 NOTE — PLAN OF CARE
"      SICU PLAN OF CARE NOTE    Dx: Presence of left ventricular assist device (LVAD)  Neuro: Follows Commands, Moves All Extremities and Confused  Intermittenly confused  Vital Signs: /70 (BP Location: Left arm, Patient Position: Lying)   Pulse 100   Temp 98.7 °F (37.1 °C) (Oral)   Resp (!) 22   Ht 5' 7" (1.702 m)   Wt 75 kg (165 lb 5.5 oz)   SpO2 96%   BMI 25.90 kg/m²   CVP 15-17 HOB flat  Afebrile  VSS, MAP maintained 65-85  Respiratory: Nasal Cannula w/ Radha  4L NC, 1.5 ppm Nitric  SpO2 checked intermittenly  Diet: Dental Soft  Gtts: Epinephrine, Dobutamine, Lasix and Heparin  PTT remains within goal 45-54  Epi at 0.02 mcg/kg/min nontitrating  Urine Output: Urinary Catheter 120-200 cc/hour  VAD   HM3, Speed 5300, No VAD alarms noted, see flowsheet for exact hourly values  Labs/Accuchecks: Accu AC/HS no coverage needed, Serial labs monitored/daily labs sent, K replaced as needed  Skin: No redness or breakdown noted on sacrum/elbows. Pt. Changes position with minimal assistance. VAD dsg remains with serosang drainage- to be changed per day RN. Remains intact.        "

## 2020-02-14 NOTE — PLAN OF CARE
Problem: Occupational Therapy Goal  Goal: Occupational Therapy Goal  Description  Goals to be met by: 2/24/20     Patient will increase functional independence with ADLs by performing:    Feeding with [place].  UE Dressing with Supervision.  LE Dressing with Supervision.  Grooming while standing at sink with Supervision.  Toileting from toilet with Supervision for hygiene and clothing management.   Toilet transfer to toilet with Supervision.  Pt will be (I) with VAD management during ADL.    Outcome: Ongoing, Progressing   The above goals remain appropriate. KARLY Artis  2/14/2020

## 2020-02-14 NOTE — PROGRESS NOTES
[facility]  Critical Care - Surgery  Progress Note    Patient Name: Saba Lott  MRN: [id number]  Admission Date: 1/15/2020  Hospital Length of Stay: 30 days  Code Status: Prior  Attending Provider: David Joshi MD  Primary Care Provider: Primary Doctor No   Principal Problem: Presence of left ventricular assist device (LVAD)    Subjective:     Hospital/ICU Course:  No notes on file    Interval History/Significant Events: NAEON. Epi 0.02, Radha 1.5, lasix gtt 20 and heparin therapeutic at 1200. UOP 3.75L over 24 hours. CVP 15-17.       Follow-up For: Procedure(s) (LRB):  CLOSURE, WOUND, STERNUM (N/A)  WASHOUT  INSERTION, GRAFT, PERICARDIUM  APPLICATION, WOUND VAC    Objective:     Vital Signs (Most Recent):  Temp: 98.4 °F (36.9 °C) (02/14/20 0700)  Pulse: 102 (02/14/20 0825)  Resp: 18 (02/14/20 0700)  BP: (!) 86/0 (02/14/20 0730)  SpO2: 98 % (02/14/20 0825) Vital Signs (24h Range):  Temp:  [98.1 °F (36.7 °C)-98.7 °F (37.1 °C)] 98.4 °F (36.9 °C)  Pulse:  [100-120] 102  Resp:  [18] 18  SpO2:  [96 %-98 %] 98 %  BP: ()/(0-79) 86/0  Arterial Line BP: ()/(64-84) 92/75     Weight: 75 kg (165 lb 5.5 oz)  Body mass index is 25.9 kg/m².      Intake/Output Summary (Last 24 hours) at 2/14/2020 0849  Last data filed at 2/14/2020 0700  Gross per 24 hour   Intake 1830 ml   Output 3940 ml   Net -2110 ml       Physical Exam   Constitutional: He is oriented to person, place, and time. He appears well-developed and well-nourished.   HENT:   Head: Normocephalic and atraumatic.   Eyes: Pupils are equal, round, and reactive to light. EOM are normal.   Neck: Normal range of motion. Neck supple.   Cardiovascular: Normal rate and regular rhythm.   LVAD in place  Introducer in place   Pulmonary/Chest: Effort normal. No respiratory distress.   Satting well on NC   Abdominal: Soft.   NGT   Genitourinary:   Genitourinary Comments: Mc cath in place   Neurological: He is alert and oriented to person, place, and  time.   Skin: Skin is warm and dry.   Psychiatric: He has a normal mood and affect. His behavior is normal.   Nursing note and vitals reviewed.      Vents:  n/a    Lines/Drains/Airways     Peripherally Inserted Central Catheter Line                 PICC Triple Lumen 02/08/20 1530 right basilic 5 days          Central Venous Catheter Line                 Percutaneous Central Line Insertion/Assessment - Quad lumen  02/06/20 0742 8 days          Drain                 Urethral Catheter 02/12/20 1620 1 day          Arterial Line                 Arterial Line 02/06/20 0736 Left Other (Comment) 8 days          Line                 VAD 02/06/20 1047 Left ventricular assist device HeartMate 3 7 days          Peripheral Intravenous Line                 Peripheral IV - Single Lumen 02/10/20 1400 18 G Left Wrist 3 days                Significant Labs:    CBC/Anemia Profile:  Recent Labs   Lab 02/13/20  0400 02/14/20  0400   WBC 19.85* 18.17*   HGB 8.4* 7.9*   HCT 28.3* 25.7*    309   MCV 86 83   RDW 19.0* 19.0*        Chemistries:  Recent Labs   Lab 02/12/20  0954  02/13/20  0400  02/13/20  1602 02/13/20  2200 02/14/20  0001 02/14/20  0400   *   < > 144   < > 140 138  --  140  140   K 4.5   < > 4.3   < > 4.0 3.9 4.0 3.7  3.7  3.7      < > 102   < > 98 95  --  97  97   CO2 30*   < > 29   < > 34* 36*  --  33*  33*   BUN 54*   < > 55*   < > 55* 47*  --  42*  42*   CREATININE 1.6*   < > 1.5*   < > 1.5* 1.4  --  1.3  1.3   CALCIUM 10.3   < > 9.7   < > 9.3 9.2  --  9.6  9.6   ALBUMIN  --   --  2.8*  --   --   --   --  2.7*  2.7*   PROT  --   --  7.9  --   --   --   --  7.9  7.9   BILITOT  --   --  0.9  --   --   --   --  1.0  1.0   ALKPHOS  --   --  134  --   --   --   --  121  121   ALT  --   --  45*  --   --   --   --  36  36   AST  --   --  57*  --   --   --   --  37  37   MG 2.8*   < > 2.8*   < > 2.3 2.2  --  2.2   PHOS 3.8  --  4.2  --   --   --   --  3.7    < > = values in this interval not  displayed.       ABGs:   Recent Labs   Lab 02/13/20  0434   PH 7.439   PCO2 44.4   HCO3 30.1*   POCSATURATED 98   BE 6     Coagulation:   Recent Labs   Lab 02/14/20  0400   INR 1.3*   APTT 49.0*  49.0*  49.0*     All pertinent labs within the past 24 hours have been reviewed.    Significant Imaging:  I have reviewed and interpreted all pertinent imaging results/findings within the past 24 hours.     Assessment/Plan:     Non-ischemic cardiomyopathy  Neuro:   - Pain control: scheduled tylenol. Pain controlled without opioids  - No sedation.     Pulmonary:   - Extubated; satting well on NC, continue to wean as tolerated  - Radha - wean to OFF today  - CPT q6hrs  - Daily CXR stable    Cardiac:  - MAP goal >65  - Epi at 0.02 wean to OFF today  -  at 5  - Cardene OFF  - amlodipine 10 qd  - Increased LVAD speed 5300 and flows to 4.9 per CTS  - LDH trending down  - PICC in place  - Lasix gtt at 20  - scheduled diuril 250 daily    Renal:     Intake/Output Summary (Last 24 hours) at 2/14/2020 0853  Last data filed at 2/14/2020 0700  Gross per 24 hour   Intake 1830 ml   Output 3940 ml   Net -2110 ml   - monitor UOP  - lasix gtt at 20  - BUN/ Cr stable at 42/1.3    Fluids/Electrolytes/Nutrition/GI:   -Nutritional status: mechanical soft diet  -bar Mg, K  -NGT  -SLP c/s  - decreased 250 free water flushes to q8hrs  - bowel regimen: Miralax and docusate   - Golytely: 100 q4hrs - can d/c once patient is having regular BMs    Hematology/Oncology:  -H/H trending down 7.96/25.7 (8.4/28.3, 9.1/29.2, 9.5/30.3)  -INR/Plts 1.3/309  -Trend CBC  -coumadin 6 at 5 pm   -hep @ 1200u/hr with PTT goal 45 - 54  -  mg qd    Infectious Disease:   -Afebrile  -Leukocytosis WBC 18.17, trending down  -Abx: completed course of vanc and ancef    Endocrine:  -Glucose goal of 120-180  -Insulin gtt per endo    Dispo:  -Continue care in the ICU setting. Wean epi and Radha to OFF. Possible step down today.         Critical Care Daily Checklist:    A:  Awake: RASS Goal/Actual Goal: RASS Goal: 0-->alert and calm  Actual: [place]an Agitation Sedation Scale (RASS): Alert and calm   B: Spontaneous Breathing Trial Performed? Spon. Breathing Trial Initiated?: Initiated (02/10/20 0910)   C: SAT & SBT Coordinated?  n/a                      D: Delirium: CAM-ICU Overall CAM-ICU: Negative   E: Early Mobility Performed? Yes   F: Feeding Goal: Goals: Patient to receive nutrition by RD follow-up  Status: Nutrition Goal Status: progressing towards goal   Current Diet Order   Procedures    Diet Dysphagia Soft (IDDSI Level 6) Fluid - 2000mL; Nectar Thick     Order Specific Question:   Fluid restriction:     Answer:   Fluid - 2000mL     Order Specific Question:   Fluid consistency:     Answer:   Nectar Thick      AS: Analgesia/Sedation Scheduled tylenol  No sedation   T: Thromboembolic Prophylaxis Heparin gtt   H: HOB > 300 Yes   U: Stress Ulcer Prophylaxis (if needed) protonix   G: Glucose Control SSI   B: Bowel Function Stool Occurrence: 1   I: Indwelling Catheter (Lines & Barton) Necessity PICC, CVC-quad, brenda, barton   D: De-escalation of Antimicrobials/Pharmacotherapies No antibiotics    Plan for the day/ETD Wean Radha and epi - possible step down today    Code Status:  Family/Goals of Care: Prior        Critical care was time spent personally by me on the following activities: development of treatment plan with patient or surrogate and bedside caregivers, discussions with consultants, evaluation of patient's response to treatment, examination of patient, ordering and performing treatments and interventions, ordering and review of laboratory studies, ordering and review of radiographic studies, pulse oximetry, re-evaluation of patient's condition.  This critical care time did not overlap with that of any other provider or involve time for any procedures.     Peggy Sanders MD  Critical Care - Surgery  [facility]

## 2020-02-14 NOTE — PROGRESS NOTES
"[facility]  Adult Nutrition  Progress Note    SUMMARY       Recommendations  1. Encourage continued good PO intake as tolerated.   2. If PO intake decrease, recommend adding Optisource OS to all meals.   RD to monitor.    Goals: Patient to receive nutrition by RD follow-up  Nutrition Goal Status: goal met  Communication of RD Recs: discussed on rounds    Reason for Assessment  Reason For Assessment: RD follow-up  Diagnosis: surgery/postoperative complications(s/p LVAD )  Relevant Medical History: CHF, NICM, HTN  Interdisciplinary Rounds: attended  General Information Comments: Patient with intermittent confusion. SLP evaluated , cleared for diet. NG pulled and diet advanced, good PO intake. NFPE completed , patient continues with moderate malnutriton. Noted significant weight loss since admission but likely fluid related loss since patient is -60L since admit.  Nutrition Discharge Planning: Adequate nutrition via PO intake.    Nutrition Risk Screen  Nutrition Risk Screen: dysphagia or difficulty swallowing    Nutrition/Diet History  Patient Reported Diet/Restrictions/Preferences: general  Spiritual, Cultural Beliefs, Alevism Practices, Values that Affect Care: no  Factors Affecting Nutritional Intake: None identified at this time    Anthropometrics  Temp: 98.4 °F (36.9 °C)  Height Method: Estimated  Height: 5' 7" (170.2 cm)  Height (inches): 67 in  Weight Method: Bed Scale  Weight: 75 kg (165 lb 5.5 oz)  Weight (lb): 165.35 lb  Ideal Body Weight (IBW), Male: 148 lb  % Ideal Body Weight, Male (lb): 143.92 %  BMI (Calculated): 25.9  BMI Grade: 35 - 39.9 - obesity - grade II  Usual Body Weight (UBW), k kg(admit weight)  % Usual Body Weight: 75.95  % Weight Change From Usual Weight: -24.21 %    Lab/Procedures/Meds  Pertinent Labs Reviewed: reviewed  Pertinent Labs Comments: BUN 42, Glu 119, Alb 2.7,   Pertinent Medications Reviewed: reviewed  Pertinent Medications Comments: " docusate, ferrous gluconate, coumadin, precedex, dobutamine, epinephrine, lasix, cardene    Estimated/Assessed Needs  Weight Used For Calorie Calculations: 75 kg (165 lb 5.5 oz)  Energy Calorie Requirements (kcal): 1930 kcal/day  Energy Need Method: [place]-St Jeor(x 1.25)  Protein Requirements:  g/day(1.2-1.4 g/kg)  Weight Used For Protein Calculations: 75 kg (165 lb 5.5 oz)  Fluid Requirements (mL): 1 mL/kcal or per MD  Estimated Fluid Requirement Method: RDA Method  RDA Method (mL): 1930    Nutrition Prescription Ordered  Current Diet Order: Dysphagia Sofr  Nutrition Order Comments: 2000mL FR, nectar thick liquids    Evaluation of Received Nutrient/Fluid Intake  I/O: -2.1L x 24hrs, -60L since admit  Comments: LBM 2/12  % Intake of Estimated Energy Needs: 75 - 100 %  % Meal Intake: 75 - 100 %    Nutrition Risk  Level of Risk/Frequency of Follow-up: low(1x/week)     Assessment and Plan  Moderate malnutrition  Nutrition Problem:  Moderate Protein-Calorie Malnutrition  Malnutrition in the context of Chronic Illness/Injury     Related to (etiology):  Lack of appetite in setting of chronic heart failure     Signs and Symptoms (as evidenced by):  Energy Intake: <75% of estimated energy requirement for > 3 months  Body Fat Depletion: mild and moderate depletion of orbitals and triceps   Muscle Mass Depletion: moderate depletion of interosseous muscle and lower extremities   Weight Loss: -24.2% x 1 month (mostly fluid related loss)   Fluid Accumulation: mild      Interventions(treatment strategy):  Collaboration of care with providers.     Nutrition Diagnosis Status:  Continues    Monitor and Evaluation  Food and Nutrient Intake: energy intake, food and beverage intake  Food and Nutrient Adminstration: diet order  Knowledge/Beliefs/Attitudes: food and nutrition knowledge/skill  Physical Activity and Function: nutrition-related ADLs and IADLs  Anthropometric Measurements: weight, weight change  Biochemical Data,  Medical Tests and Procedures: electrolyte and renal panel, gastrointestinal profile, inflammatory profile  Nutrition-Focused Physical Findings: overall appearance     Malnutrition Assessment  Energy Intake (Malnutrition): less than 75% for greater than or equal to 3 months   Orbital Region (Subcutaneous Fat Loss): mild depletion  Upper Arm Region (Subcutaneous Fat Loss): moderate depletion  Thoracic and Lumbar Region: well nourished   Industry Region (Muscle Loss): well nourished  Clavicle Bone Region (Muscle Loss): well nourished  Clavicle and Acromion Bone Region (Muscle Loss): well nourished  Dorsal Hand (Muscle Loss): moderate depletion  Patellar Region (Muscle Loss): moderate depletion  Anterior Thigh Region (Muscle Loss): moderate depletion  Posterior Calf Region (Muscle Loss): moderate depletion     Nutrition Follow-Up  RD Follow-up?: Yes

## 2020-02-14 NOTE — SUBJECTIVE & OBJECTIVE
Interval History/Significant Events: NAEON. Epi 0.02, Radha 1.5, lasix gtt 20 and heparin therapeutic at 1200. UOP 3.75L over 24 hours. CVP 15-17.       Follow-up For: Procedure(s) (LRB):  CLOSURE, WOUND, STERNUM (N/A)  WASHOUT  INSERTION, GRAFT, PERICARDIUM  APPLICATION, WOUND VAC    Objective:     Vital Signs (Most Recent):  Temp: 98.4 °F (36.9 °C) (02/14/20 0700)  Pulse: 102 (02/14/20 0825)  Resp: 18 (02/14/20 0700)  BP: (!) 86/0 (02/14/20 0730)  SpO2: 98 % (02/14/20 0825) Vital Signs (24h Range):  Temp:  [98.1 °F (36.7 °C)-98.7 °F (37.1 °C)] 98.4 °F (36.9 °C)  Pulse:  [100-120] 102  Resp:  [18] 18  SpO2:  [96 %-98 %] 98 %  BP: ()/(0-79) 86/0  Arterial Line BP: ()/(64-84) 92/75     Weight: 75 kg (165 lb 5.5 oz)  Body mass index is 25.9 kg/m².      Intake/Output Summary (Last 24 hours) at 2/14/2020 0849  Last data filed at 2/14/2020 0700  Gross per 24 hour   Intake 1830 ml   Output 3940 ml   Net -2110 ml       Physical Exam   Constitutional: He is oriented to person, place, and time. He appears well-developed and well-nourished.   HENT:   Head: Normocephalic and atraumatic.   Eyes: Pupils are equal, round, and reactive to light. EOM are normal.   Neck: Normal range of motion. Neck supple.   Cardiovascular: Normal rate and regular rhythm.   LVAD in place  Introducer in place   Pulmonary/Chest: Effort normal. No respiratory distress.   Satting well on NC   Abdominal: Soft.   NGT   Genitourinary:   Genitourinary Comments: Mc cath in place   Neurological: He is alert and oriented to person, place, and time.   Skin: Skin is warm and dry.   Psychiatric: He has a normal mood and affect. His behavior is normal.   Nursing note and vitals reviewed.      Vents:  n/a    Lines/Drains/Airways     Peripherally Inserted Central Catheter Line                 PICC Triple Lumen 02/08/20 1530 right basilic 5 days          Central Venous Catheter Line                 Percutaneous Central Line Insertion/Assessment - Quad  lumen  02/06/20 0742 8 days          Drain                 Urethral Catheter 02/12/20 1620 1 day          Arterial Line                 Arterial Line 02/06/20 0736 Left Other (Comment) 8 days          Line                 VAD 02/06/20 1047 Left ventricular assist device HeartMate 3 7 days          Peripheral Intravenous Line                 Peripheral IV - Single Lumen 02/10/20 1400 18 G Left Wrist 3 days                Significant Labs:    CBC/Anemia Profile:  Recent Labs   Lab 02/13/20  0400 02/14/20  0400   WBC 19.85* 18.17*   HGB 8.4* 7.9*   HCT 28.3* 25.7*    309   MCV 86 83   RDW 19.0* 19.0*        Chemistries:  Recent Labs   Lab 02/12/20  0954  02/13/20  0400  02/13/20  1602 02/13/20  2200 02/14/20  0001 02/14/20  0400   *   < > 144   < > 140 138  --  140  140   K 4.5   < > 4.3   < > 4.0 3.9 4.0 3.7  3.7  3.7      < > 102   < > 98 95  --  97  97   CO2 30*   < > 29   < > 34* 36*  --  33*  33*   BUN 54*   < > 55*   < > 55* 47*  --  42*  42*   CREATININE 1.6*   < > 1.5*   < > 1.5* 1.4  --  1.3  1.3   CALCIUM 10.3   < > 9.7   < > 9.3 9.2  --  9.6  9.6   ALBUMIN  --   --  2.8*  --   --   --   --  2.7*  2.7*   PROT  --   --  7.9  --   --   --   --  7.9  7.9   BILITOT  --   --  0.9  --   --   --   --  1.0  1.0   ALKPHOS  --   --  134  --   --   --   --  121  121   ALT  --   --  45*  --   --   --   --  36  36   AST  --   --  57*  --   --   --   --  37  37   MG 2.8*   < > 2.8*   < > 2.3 2.2  --  2.2   PHOS 3.8  --  4.2  --   --   --   --  3.7    < > = values in this interval not displayed.       ABGs:   Recent Labs   Lab 02/13/20  0434   PH 7.439   PCO2 44.4   HCO3 30.1*   POCSATURATED 98   BE 6     Coagulation:   Recent Labs   Lab 02/14/20  0400   INR 1.3*   APTT 49.0*  49.0*  49.0*     All pertinent labs within the past 24 hours have been reviewed.    Significant Imaging:  I have reviewed and interpreted all pertinent imaging results/findings within the past 24 hours.

## 2020-02-14 NOTE — PT/OT/SLP PROGRESS
Occupational Therapy   Treatment    Name: Saba Lott  MRN: [id number]  Admitting Diagnosis:  Presence of left ventricular assist device (LVAD)  7 Days Post-Op    Recommendations:     Discharge Recommendations: home health OT  Discharge Equipment Recommendations:  (TBD)  Barriers to discharge:  None    Assessment:     Saba Lott is a 54 y.o. male with a medical diagnosis of Presence of left ventricular assist device (LVAD).  He presents with improving ADL performance and activity tolerance. Performance deficits affecting function are weakness, gait instability, decreased upper extremity function, impaired balance, impaired endurance, impaired self care skills, impaired functional mobilty, impaired cardiopulmonary response to activity, pain.     Rehab Prognosis:  Good; patient would benefit from acute skilled OT services to address these deficits and reach maximum level of function.       Plan:     Patient to be seen 6 x/week to address the above listed problems via self-care/home management, therapeutic activities, therapeutic exercises  · Plan of Care Expires: 03/11/20  · Plan of Care Reviewed with: patient    Subjective     Pain/Comfort:  · Pain Rating 1: 0/10  · Pain Rating Post-Intervention 1: 0/10    Objective:     Communicated with: RN prior to session.  Patient found up in chair with blood pressure cuff, telemetry, pulse ox (continuous), LVAD, PICC line, barton catheter, arterial line, wound vac upon OT entry to room.    General Precautions: Standard, fall, LVAD, sternal, aspiration   Orthopedic Precautions:N/A   Braces:       Occupational Performance:     Bed Mobility:    · NT     Functional Mobility/Transfers:  · Patient completed Sit <> Stand Transfer with minimum assistance  with  no assistive device   · Patient completed Toilet Transfer sit<>stand technique with minimum assistance with  bedside commode  · Functional Mobility: Minimal A <> close CGA to/from bathroom    Activities of Daily  Living:  · Grooming: modified independence with for oral hygiene, facial washing and combing hair  · Upper Body Dressing: maximal assistance    · Lower Body Dressing: maximal assistance donning socks      AMPA 6 Click ADL: 16    Treatment & Education:  Pt ed on OT POC  Pt ed on VAD management including LVAD sternal precautions; proper anchor placement and driveline securement for DLES integrity; reason for and performance of Self Test; identifying parts including controller, driveline, power cords, controller pouch, batteries, clips, consolidation bag, battery charger; pairing and rotating batteries; checking battery power; contents of emergency bag and need for having emergency bag when out of room/out of houseEducation:  ; how to retrieve VAD numbers from controller; recording numbers in binder; transitioning to/from battery and wall power  Pt required minimal A for transition to battery with step-by-step verbal instruction  Pt stood at sink x 4 minutes with CGA 2* posterior lean while engaged in self-care tasks    Patient left up in chair with all lines intact, call button in reach and RN notifiedEducation:      GOALS:   Multidisciplinary Problems     Occupational Therapy Goals        Problem: Occupational Therapy Goal    Goal Priority Disciplines Outcome Interventions   Occupational Therapy Goal     OT, PT/OT Ongoing, Progressing    Description:  Goals to be met by: 2/24/20     Patient will increase functional independence with ADLs by performing:    Feeding with [place].  UE Dressing with Supervision.  LE Dressing with Supervision.  Grooming while standing at sink with Supervision.  Toileting from toilet with Supervision for hygiene and clothing management.   Toilet transfer to toilet with Supervision.  Pt will be (I) with VAD management during ADL.               Multidisciplinary Problems (Resolved)        Problem: Occupational Therapy Goal    Goal Priority Disciplines Outcome Interventions    Occupational Therapy Goal   (Resolved)     OT, PT/OT Met    Description:  Goals to be met by: 7 days 1/30/20     Patient will increase functional independence with ADLs by performing:    Pt to complete UE dressing with set-up-MET  Pt to complete LE dressing with SBA with AE-MET   Pt to complete toileting with supervision.--MET  Pt to complete standing g/h skills with supervision.--MET  Pt to complete t/f to commode, bed and chair with SBA--MET                         Time Tracking:     OT Date of Treatment: 02/14/20  OT Start Time: 1048  OT Stop Time: 1126  OT Total Time (min): 38 min    Billable Minutes:Self Care/Home Management 10  Therapeutic Activity 28    KARLY Artis  2/14/2020

## 2020-02-14 NOTE — PROGRESS NOTES
UPDATE    SW to pt's room for update today.  Pt is s/p LVAD implant on 2/6.  Pt presents as sitting up in bedside chair, aao x4, calm, cooperative, and asking and answering questions appropriately.  Pt reports some pain at sternal incision site when he coughs, but otherwise reports feeling well.  Pt states he has been working with PT and has been walking to window in room and back to chair.  Pt states he still feels weak, but thinks he is doing well with PT.  Pt reports plan from MDs is to continue weaning drips and hopefully stepdown to floor early next week.  Pt reports his mother/primary caregiver Anna will be returning to hospital later today.  Pt reports coping adequately at this time, and denies any needs or concerns to SW.  SW providing ongoing psychosocial and counseling support, education, resources, assistance, and discharge planning as indicated.  SW following and remains available.

## 2020-02-14 NOTE — ASSESSMENT & PLAN NOTE
-HeartMate 3 Implanted 02/06/2020 as DT. Chest closed 2/7.   -CTS Primary.  -Implanted by Dr. Joshi.  -INR sub therapeutic Coumadin, Goal INR 2.0-3.0. Anticoag per CTS.   -Antiplatelets  mg.  -LDH is stable overall today. Will continue to monitor daily.  -Continues on Epi, , Cardene, Radha.  -CVP elevated. Continue aggressive diuresis. Currently on LASIX 20MG/HR.   -Speed set at 5200, LSL 4800 rpm.  -Not listed for OHTx.  Procedure: Device Interrogation Including analysis of device parameters.  Current Settings: Ventricular Assist Device.  Review of device function is stable/unstable stable.    TXP LVAD INTERROGATIONS 2/14/2020 2/14/2020 2/14/2020 2/14/2020 2/14/2020 2/14/2020 2/14/2020   Type HeartMate3 HeartMate3 HeartMate3 HeartMate3 HeartMate3 HeartMate3 HeartMate3   Flow 4.6 4.5 4.5 4.4 4.5 4.5 4.6   Speed 5300 5300 5300 5300 5300 5300 5300   PI 3.1 3.4 3.6 3.5 3.2 3.5 3.2   Power ([place]) 3.9 3.9 4 4 4 3.9 4   LSL 4900 4900 4900 4900 4900 4900 4900   Pulsatility Pulse - - - Pulse - -

## 2020-02-14 NOTE — ASSESSMENT & PLAN NOTE
Neuro:   - Pain control: scheduled tylenol. Pain controlled without opioids  - No sedation.     Pulmonary:   - Extubated; satting well on NC, continue to wean as tolerated  - Radha - wean to OFF today  - CPT q6hrs  - Daily CXR stable    Cardiac:  - MAP goal >65  - Epi at 0.02 wean to OFF today  -  at 5  - Cardene OFF  - amlodipine 10 qd  - Increased LVAD speed 5300 and flows to 4.9 per CTS  - LDH trending down  - PICC in place  - Lasix gtt at 20  - scheduled diuril 250 daily    Renal:     Intake/Output Summary (Last 24 hours) at 2/14/2020 0853  Last data filed at 2/14/2020 0700  Gross per 24 hour   Intake 1830 ml   Output 3940 ml   Net -2110 ml   - monitor UOP  - lasix gtt at 20  - BUN/ Cr stable at 42/1.3    Fluids/Electrolytes/Nutrition/GI:   -Nutritional status: mechanical soft diet  -bar Mg, K  -NGT  -SLP c/s  - decreased 250 free water flushes to q8hrs  - bowel regimen: Miralax and docusate   - Golytely: 100 q4hrs - can d/c once patient is having regular BMs    Hematology/Oncology:  -H/H trending down 7.96/25.7 (8.4/28.3, 9.1/29.2, 9.5/30.3)  -INR/Plts 1.3/309  -Trend CBC  -coumadin 6 at 5 pm   -hep @ 1200u/hr with PTT goal 45 - 54  -  mg qd    Infectious Disease:   -Afebrile  -Leukocytosis WBC 18.17, trending down  -Abx: completed course of vanc and ancef    Endocrine:  -Glucose goal of 120-180  -Insulin gtt per endo    Dispo:  -Continue care in the ICU setting. Wean epi and Radha to OFF. Possible step down today.

## 2020-02-14 NOTE — PROGRESS NOTES
[facility]  Heart Transplant  Progress Note    Patient Name: Saba Lott  MRN: [id number]  Admission Date: 1/15/2020  Hospital Length of Stay: 30 days  Attending Physician: David Joshi MD  Primary Care Provider: Primary Doctor No  Principal Problem:Presence of left ventricular assist device (LVAD)    Subjective:     Interval History: No complaints this am.     Continuous Infusions:   sodium chloride 0.9% 10 mL/hr at 02/07/20 1632    dexmedetomidine (PRECEDEX) infusion Stopped (02/10/20 0800)    dextrose 5 % and 0.45 % NaCl with KCl 40 mEq 10 mL/hr at 02/07/20 1632    DOBUTamine 5 mcg/kg/min (02/14/20 0700)    epinephrine 0.02 mcg/kg/min (02/14/20 0700)    furosemide (LASIX) 2 mg/mL continuous infusion (non-titrating) 20 mg/hr (02/14/20 0700)    heparin (porcine) in 5 % dex 1,200 Units/hr (02/14/20 0700)    nicardipine 2.5 mg/hr (02/11/20 0600)    nitric oxide gas       Scheduled Meds:   acetaminophen  650 mg Oral Q6H    amLODIPine  10 mg Oral Daily    aspirin  325 mg Per NG tube Daily    chlorothiazide (DIURIL) IVPB  250 mg Intravenous Q24H    chlorothiazide (DIURIL) IVPB  500 mg Intravenous Once    docusate sodium  100 mg Oral BID    ferrous gluconate  324 mg Oral Daily with breakfast    hydrALAZINE  25 mg Oral Q8H    magnesium oxide  800 mg Oral TID    pantoprazole  40 mg Oral Daily    polyethylene glycol  17 g Oral Daily    potassium chloride 10%  20 mEq Per NG tube BID    sodium chloride 0.9%  10 mL Intravenous Q6H     PRN Meds:albumin human 5%, albuterol sulfate, bisacodyL, Dextrose 10% Bolus, Dextrose 10% Bolus, Dextrose 10% Bolus, Dextrose 10% Bolus, diphenhydrAMINE, hydrALAZINE, magnesium hydroxide 400 mg/5 ml, magnesium sulfate IVPB, potassium chloride 10%, potassium chloride 10%, potassium chloride 10%, potassium chloride 10%, sodium chloride 0.9%, Flushing PICC Protocol **AND** sodium chloride 0.9% **AND** sodium chloride 0.9%    Review of patient's allergies  indicates:   Allergen Reactions    Iodine and iodide containing products      Objective:     Vital Signs (Most Recent):  Temp: 98.4 °F (36.9 °C) (02/14/20 0700)  Pulse: 104 (02/14/20 0600)  Resp: 18 (02/14/20 0700)  BP: (!) 86/0 (02/14/20 0730)  SpO2: 98 % (02/14/20 0700) Vital Signs (24h Range):  Temp:  [98.1 °F (36.7 °C)-98.7 °F (37.1 °C)] 98.4 °F (36.9 °C)  Pulse:  [100-120] 104  Resp:  [18] 18  SpO2:  [96 %-98 %] 98 %  BP: ()/(0-79) 86/0  Arterial Line BP: ()/(64-84) 92/75     Patient Vitals for the past 72 hrs (Last 3 readings):   Weight   02/14/20 0300 75 kg (165 lb 5.5 oz)   02/13/20 0500 78.6 kg (173 lb 4.5 oz)   02/12/20 1315 96.6 kg (213 lb)     Body mass index is 25.9 kg/m².      Intake/Output Summary (Last 24 hours) at 2/14/2020 0823  Last data filed at 2/14/2020 0700  Gross per 24 hour   Intake 1830 ml   Output 3940 ml   Net -2110 ml       Hemodynamic Parameters: CVP 16       Physical Exam   Constitutional: He is oriented to person, place, and time. He appears well-developed and well-nourished.   HENT:   Head: Normocephalic and atraumatic.   Cardiovascular: Normal rate and regular rhythm.   LVAD in place  Introducer in place   Pulmonary/Chest: Effort normal. No respiratory distress.   NC   Abdominal: Soft.   Genitourinary:   Genitourinary Comments: Mc cath in place   Neurological: He is alert and oriented to person, place, and time.   Skin: Skin is warm and dry.   Psychiatric: He has a normal mood and affect. His behavior is normal.   Nursing note and vitals reviewed.    Significant Labs:  CBC:  Recent Labs   Lab 02/12/20  0400 02/13/20  0400 02/14/20  0400   WBC 18.11* 19.85* 18.17*   RBC 3.51* 3.31* 3.08*   HGB 9.1* 8.4* 7.9*   HCT 29.2* 28.3* 25.7*    320 309   MCV 83 86 83   MCH 25.9* 25.4* 25.6*   MCHC 31.2* 29.7* 30.7*     BNP:  Recent Labs   Lab 02/10/20  0401 02/12/20  0400 02/14/20  0400   * 1,356* 808*     CMP:  Recent Labs   Lab 02/12/20  0400  02/13/20  0400   02/13/20  1602 02/13/20 2200 02/14/20  0001 02/14/20 0400   *  120*   < > 127*   < > 135* 127*  --  119*  119*   CALCIUM 10.2  10.2   < > 9.7   < > 9.3 9.2  --  9.6  9.6   ALBUMIN 3.0*  3.0*  --  2.8*  --   --   --   --  2.7*  2.7*   PROT 8.5*  8.5*  --  7.9  --   --   --   --  7.9  7.9   *  147*   < > 144   < > 140 138  --  140  140   K 4.2  4.2   < > 4.3   < > 4.0 3.9 4.0 3.7  3.7  3.7   CO2 30*  30*   < > 29   < > 34* 36*  --  33*  33*     105   < > 102   < > 98 95  --  97  97   BUN 52*  52*   < > 55*   < > 55* 47*  --  42*  42*   CREATININE 1.5*  1.5*   < > 1.5*   < > 1.5* 1.4  --  1.3  1.3   ALKPHOS 127  127  --  134  --   --   --   --  121  121   ALT 40  40  --  45*  --   --   --   --  36  36   AST 58*  58*  --  57*  --   --   --   --  37  37   BILITOT 1.1*  1.1*  --  0.9  --   --   --   --  1.0  1.0    < > = values in this interval not displayed.      Coagulation:   Recent Labs   Lab 02/12/20  0953  02/13/20  0400 02/13/20 1602 02/13/20 2200 02/14/20  0400   INR 1.2  --  1.2  --   --   --  1.3*   APTT 27.1   < > 54.5*   < > 49.6* 49.6* 49.0*  49.0*  49.0*    < > = values in this interval not displayed.     LDH:  Recent Labs   Lab 02/12/20 0400 02/13/20 0400 02/14/20 0400   * 374* 433*     Microbiology:  Microbiology Results (last 7 days)     Procedure Component Value Units Date/Time    Blood culture [[id number]] Collected:  02/03/20 0946    Order Status:  Completed Specimen:  Blood from Peripheral, Hand, Right Updated:  02/08/20 1022     Blood Culture, Routine No growth after 5 days.    Blood culture [[id number]] Collected:  02/03/20 0946    Order Status:  Completed Specimen:  Blood from Peripheral, Hand, Right Updated:  02/08/20 1022     Blood Culture, Routine No growth after 5 days.        I have reviewed all pertinent labs within the past 24 hours.    Estimated Creatinine Clearance: 60 mL/min (based on SCr of 1.3 mg/dL).    Diagnostic  Results:  I have reviewed and interpreted all pertinent imaging results/findings within the past 24 hours.    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 Chronic MARC - Class II-III and mild LE edema.      Hospital Course (synopsis of major diagnoses, care, treatment, and services provided during the course of the hospital stay):  -2/12 admit to CDU for diuresis with IV lasix  -IV abx   -CXR with suspected small left pleural effusion with associated left basilar atelectasis versus evolving airspace disease  -2/13 single chamber ICD implant; IV lasix decreased to BID  -2/16 add dobutamine, hold BB due to hypotension, no ACE-I or ARB due to recent HPI hypotension  -2/17 Lasix changed to PO  -2/18 dobutamine discontinued    Admitted to [facility] on Thursday due to severe SOB for a week with associated orthopnea, MARC, and PND unable to lie flat and found to be in acute diastolic heart failure. States he normally weighs around 219 but up to 254lb on admission tonight. Says he hasn't been the most compliant in terms of fluid restriction and daily weights but has avoided salt. BNP on admission was 2375. BUN/CRT 32/1.4 He was started on IV diuretics but continued to deteriorate. Creatinine continued to increase and reached 4.3 with oliguria. He was started on CRRT for a few days and tolerated well. Renal u/s was negative for obstruction and liver u/s without findings of cirrhosis. All of this was progression of cardiorenal syndrome with liver congestion from severe volume overload. On arrival vitals stable and in no acute distress. He has obvious anasarca up to the chest. He did have uop of 500 at time of arrival also.    TTE 5/28/19  · Moderate left ventricular enlargement.  · Severely decreased left ventricular systolic  function. The estimated ejection fraction is 20%  · Global hypokinetic wall motion.  · Grade III (severe) left ventricular diastolic dysfunction consistent with restrictive physiology.  · Severe left atrial enlargement.  · Moderate right ventricular enlargement.  · Low normal right ventricular systolic function.  · Mild right atrial enlargement.  · Moderate mitral regurgitation.  Mild to moderate tricuspid regurgitation    * Presence of left ventricular assist device (LVAD)  -HeartMate 3 Implanted 02/06/2020 as DT. Chest closed 2/7.   -CTS Primary.  -Implanted by Dr. Joshi.  -INR sub therapeutic Coumadin, Goal INR 2.0-3.0. Anticoag per CTS.   -Antiplatelets  mg.  -LDH is stable overall today. Will continue to monitor daily.  -Continues on Epi, , Cardene, Radha.  -CVP elevated. Continue aggressive diuresis. Currently on LASIX 20MG/HR.   -Speed set at 5200, LSL 4800 rpm.  -Not listed for OHTx.  Procedure: Device Interrogation Including analysis of device parameters.  Current Settings: Ventricular Assist Device.  Review of device function is stable/unstable stable.    TXP LVAD INTERROGATIONS 2/14/2020 2/14/2020 2/14/2020 2/14/2020 2/14/2020 2/14/2020 2/14/2020   Type HeartMate3 HeartMate3 HeartMate3 HeartMate3 HeartMate3 HeartMate3 HeartMate3   Flow 4.6 4.5 4.5 4.4 4.5 4.5 4.6   Speed 5300 5300 5300 5300 5300 5300 5300   PI 3.1 3.4 3.6 3.5 3.2 3.5 3.2   Power (Centeno) 3.9 3.9 4 4 4 3.9 4   LSL 4900 4900 4900 4900 4900 4900 4900   Pulsatility Pulse - - - Pulse - -       ANJELICA (acute kidney injury)  -Creatinine was 3.0 on admit and got as high as 4.3 at OSH prior to transfer. Renal function was normal 8 months ago.  -Trending down today.     Acute hyperglycemia  -HgA1C 6.6  -Endocrine following    Moderate malnutrition  - Boost glucose control added 1/27   - Prealbumin is 23    Morbid obesity  -Weight down considerably since admit with diuresis.    Cardiogenic shock  -NICM; Likely Etoh induced  -Transferred from  [facility] with IABP at 1:1 for further level of care. IABP removed 1/25 and replaced 2/3.   -S/P HMIII on 2/3.     Deep vein thrombosis (DVT) of right lower extremity  -Unsure of timing but was on eliquis PTA.   -Will anticoag with heparin/warfarin post VAD.     AICD (automatic cardioverter/defibrillator) present  -Medtronic single chamber ICD placed 2019 for primary prevention      Mickey Rider NP  Heart Transplant  [facility]

## 2020-02-15 LAB
ALBUMIN SERPL BCP-MCNC: 2.6 G/DL (ref 3.5–5.2)
ALLENS TEST: ABNORMAL
ALP SERPL-CCNC: 125 U/L (ref 55–135)
ALT SERPL W/O P-5'-P-CCNC: 29 U/L (ref 10–44)
ANION GAP SERPL CALC-SCNC: 10 MMOL/L (ref 8–16)
ANION GAP SERPL CALC-SCNC: 10 MMOL/L (ref 8–16)
ANION GAP SERPL CALC-SCNC: 8 MMOL/L (ref 8–16)
ANION GAP SERPL CALC-SCNC: 8 MMOL/L (ref 8–16)
ANION GAP SERPL CALC-SCNC: 9 MMOL/L (ref 8–16)
APTT BLDCRRT: 42.6 SEC (ref 21–32)
APTT BLDCRRT: 42.6 SEC (ref 21–32)
APTT BLDCRRT: 46.3 SEC (ref 21–32)
APTT BLDCRRT: 52.9 SEC (ref 21–32)
AST SERPL-CCNC: 29 U/L (ref 10–40)
BASOPHILS # BLD AUTO: 0.04 K/UL (ref 0–0.2)
BASOPHILS NFR BLD: 0.2 % (ref 0–1.9)
BILIRUB DIRECT SERPL-MCNC: 0.6 MG/DL (ref 0.1–0.3)
BILIRUB SERPL-MCNC: 0.7 MG/DL (ref 0.1–1)
BUN SERPL-MCNC: 35 MG/DL (ref 6–20)
BUN SERPL-MCNC: 35 MG/DL (ref 6–20)
BUN SERPL-MCNC: 38 MG/DL (ref 6–20)
CALCIUM SERPL-MCNC: 8.2 MG/DL (ref 8.7–10.5)
CALCIUM SERPL-MCNC: 8.2 MG/DL (ref 8.7–10.5)
CALCIUM SERPL-MCNC: 9.1 MG/DL (ref 8.7–10.5)
CHLORIDE SERPL-SCNC: 93 MMOL/L (ref 95–110)
CHLORIDE SERPL-SCNC: 93 MMOL/L (ref 95–110)
CHLORIDE SERPL-SCNC: 96 MMOL/L (ref 95–110)
CHLORIDE SERPL-SCNC: 98 MMOL/L (ref 95–110)
CHLORIDE SERPL-SCNC: 98 MMOL/L (ref 95–110)
CO2 SERPL-SCNC: 30 MMOL/L (ref 23–29)
CO2 SERPL-SCNC: 30 MMOL/L (ref 23–29)
CO2 SERPL-SCNC: 33 MMOL/L (ref 23–29)
CO2 SERPL-SCNC: 34 MMOL/L (ref 23–29)
CO2 SERPL-SCNC: 34 MMOL/L (ref 23–29)
CREAT SERPL-MCNC: 1.1 MG/DL (ref 0.5–1.4)
CREAT SERPL-MCNC: 1.1 MG/DL (ref 0.5–1.4)
CREAT SERPL-MCNC: 1.2 MG/DL (ref 0.5–1.4)
CREAT SERPL-MCNC: 1.2 MG/DL (ref 0.5–1.4)
CREAT SERPL-MCNC: 1.3 MG/DL (ref 0.5–1.4)
DELSYS: ABNORMAL
DIFFERENTIAL METHOD: ABNORMAL
EOSINOPHIL # BLD AUTO: 0.4 K/UL (ref 0–0.5)
EOSINOPHIL NFR BLD: 2.4 % (ref 0–8)
ERYTHROCYTE [DISTWIDTH] IN BLOOD BY AUTOMATED COUNT: 18.9 % (ref 11.5–14.5)
EST. GFR  (AFRICAN AMERICAN): >60 ML/MIN/1.73 M^2
EST. GFR  (NON AFRICAN AMERICAN): >60 ML/MIN/1.73 M^2
GLUCOSE SERPL-MCNC: 137 MG/DL (ref 70–110)
GLUCOSE SERPL-MCNC: 137 MG/DL (ref 70–110)
GLUCOSE SERPL-MCNC: 152 MG/DL (ref 70–110)
GLUCOSE SERPL-MCNC: 162 MG/DL (ref 70–110)
GLUCOSE SERPL-MCNC: 162 MG/DL (ref 70–110)
HCO3 UR-SCNC: 35.7 MMOL/L (ref 24–28)
HCT VFR BLD AUTO: 24.6 % (ref 40–54)
HGB BLD-MCNC: 7.5 G/DL (ref 14–18)
IMM GRANULOCYTES # BLD AUTO: 0.79 K/UL (ref 0–0.04)
IMM GRANULOCYTES NFR BLD AUTO: 4.2 % (ref 0–0.5)
INR PPP: 1.2 (ref 0.8–1.2)
LDH SERPL L TO P-CCNC: 362 U/L (ref 110–260)
LYMPHOCYTES # BLD AUTO: 1.8 K/UL (ref 1–4.8)
LYMPHOCYTES NFR BLD: 9.5 % (ref 18–48)
MAGNESIUM SERPL-MCNC: 2.1 MG/DL (ref 1.6–2.6)
MCH RBC QN AUTO: 25.3 PG (ref 27–31)
MCHC RBC AUTO-ENTMCNC: 30.5 G/DL (ref 32–36)
MCV RBC AUTO: 83 FL (ref 82–98)
MONOCYTES # BLD AUTO: 1.1 K/UL (ref 0.3–1)
MONOCYTES NFR BLD: 5.8 % (ref 4–15)
NEUTROPHILS # BLD AUTO: 14.5 K/UL (ref 1.8–7.7)
NEUTROPHILS NFR BLD: 77.9 % (ref 38–73)
NRBC BLD-RTO: 1 /100 WBC
PCO2 BLDA: 54.4 MMHG (ref 35–45)
PH SMN: 7.42 [PH] (ref 7.35–7.45)
PHOSPHATE SERPL-MCNC: 2.8 MG/DL (ref 2.7–4.5)
PLATELET # BLD AUTO: 313 K/UL (ref 150–350)
PMV BLD AUTO: 8.9 FL (ref 9.2–12.9)
PO2 BLDA: 33 MMHG (ref 40–60)
POC BE: 11 MMOL/L
POC SATURATED O2: 64 % (ref 95–100)
POC TCO2: 37 MMOL/L (ref 24–29)
POTASSIUM SERPL-SCNC: 3.7 MMOL/L (ref 3.5–5.1)
POTASSIUM SERPL-SCNC: 3.8 MMOL/L (ref 3.5–5.1)
POTASSIUM SERPL-SCNC: 3.8 MMOL/L (ref 3.5–5.1)
POTASSIUM SERPL-SCNC: 4.2 MMOL/L (ref 3.5–5.1)
POTASSIUM SERPL-SCNC: 4.2 MMOL/L (ref 3.5–5.1)
POTASSIUM SERPL-SCNC: 4.4 MMOL/L (ref 3.5–5.1)
PREALB SERPL-MCNC: 13 MG/DL (ref 20–43)
PROT SERPL-MCNC: 7.8 G/DL (ref 6–8.4)
PROTHROMBIN TIME: 12.2 SEC (ref 9–12.5)
RBC # BLD AUTO: 2.97 M/UL (ref 4.6–6.2)
SAMPLE: ABNORMAL
SITE: ABNORMAL
SODIUM SERPL-SCNC: 135 MMOL/L (ref 136–145)
SODIUM SERPL-SCNC: 135 MMOL/L (ref 136–145)
SODIUM SERPL-SCNC: 138 MMOL/L (ref 136–145)
WBC # BLD AUTO: 18.67 K/UL (ref 3.9–12.7)

## 2020-02-15 PROCEDURE — 25000003 PHARM REV CODE 250: Performed by: STUDENT IN AN ORGANIZED HEALTH CARE EDUCATION/TRAINING PROGRAM

## 2020-02-15 PROCEDURE — 85730 THROMBOPLASTIN TIME PARTIAL: CPT | Mod: 91

## 2020-02-15 PROCEDURE — 99232 PR SUBSEQUENT HOSPITAL CARE,LEVL II: ICD-10-PCS | Mod: ,,, | Performed by: INTERNAL MEDICINE

## 2020-02-15 PROCEDURE — 82803 BLOOD GASES ANY COMBINATION: CPT

## 2020-02-15 PROCEDURE — 93750 INTERROGATION VAD IN PERSON: CPT | Mod: ,,, | Performed by: INTERNAL MEDICINE

## 2020-02-15 PROCEDURE — 85730 THROMBOPLASTIN TIME PARTIAL: CPT

## 2020-02-15 PROCEDURE — 63600175 PHARM REV CODE 636 W HCPCS: Performed by: STUDENT IN AN ORGANIZED HEALTH CARE EDUCATION/TRAINING PROGRAM

## 2020-02-15 PROCEDURE — 84132 ASSAY OF SERUM POTASSIUM: CPT

## 2020-02-15 PROCEDURE — 27000248 HC VAD-ADDITIONAL DAY

## 2020-02-15 PROCEDURE — 83735 ASSAY OF MAGNESIUM: CPT

## 2020-02-15 PROCEDURE — 83615 LACTATE (LD) (LDH) ENZYME: CPT

## 2020-02-15 PROCEDURE — A4216 STERILE WATER/SALINE, 10 ML: HCPCS | Performed by: THORACIC SURGERY (CARDIOTHORACIC VASCULAR SURGERY)

## 2020-02-15 PROCEDURE — 63600175 PHARM REV CODE 636 W HCPCS: Performed by: SURGERY

## 2020-02-15 PROCEDURE — 99233 SBSQ HOSP IP/OBS HIGH 50: CPT | Mod: GC,,, | Performed by: SURGERY

## 2020-02-15 PROCEDURE — 97116 GAIT TRAINING THERAPY: CPT | Mod: CQ

## 2020-02-15 PROCEDURE — 99232 SBSQ HOSP IP/OBS MODERATE 35: CPT | Mod: ,,, | Performed by: INTERNAL MEDICINE

## 2020-02-15 PROCEDURE — 99233 PR SUBSEQUENT HOSPITAL CARE,LEVL III: ICD-10-PCS | Mod: GC,,, | Performed by: SURGERY

## 2020-02-15 PROCEDURE — 85610 PROTHROMBIN TIME: CPT

## 2020-02-15 PROCEDURE — 93750 PR INTERROGATE VENT ASSIST DEV, IN PERSON, W PHYSICIAN ANALYSIS: ICD-10-PCS | Mod: ,,, | Performed by: INTERNAL MEDICINE

## 2020-02-15 PROCEDURE — 80076 HEPATIC FUNCTION PANEL: CPT

## 2020-02-15 PROCEDURE — 80048 BASIC METABOLIC PNL TOTAL CA: CPT

## 2020-02-15 PROCEDURE — 20000000 HC ICU ROOM

## 2020-02-15 PROCEDURE — 80048 BASIC METABOLIC PNL TOTAL CA: CPT | Mod: 91

## 2020-02-15 PROCEDURE — 85025 COMPLETE CBC W/AUTO DIFF WBC: CPT

## 2020-02-15 PROCEDURE — 84134 ASSAY OF PREALBUMIN: CPT

## 2020-02-15 PROCEDURE — 99900035 HC TECH TIME PER 15 MIN (STAT)

## 2020-02-15 PROCEDURE — 84100 ASSAY OF PHOSPHORUS: CPT

## 2020-02-15 PROCEDURE — 94761 N-INVAS EAR/PLS OXIMETRY MLT: CPT

## 2020-02-15 PROCEDURE — 97530 THERAPEUTIC ACTIVITIES: CPT | Mod: CQ

## 2020-02-15 PROCEDURE — 25000003 PHARM REV CODE 250: Performed by: THORACIC SURGERY (CARDIOTHORACIC VASCULAR SURGERY)

## 2020-02-15 RX ADMIN — Medication 800 MG: at 02:02

## 2020-02-15 RX ADMIN — CHLOROTHIAZIDE SODIUM 250 MG: 500 INJECTION, POWDER, LYOPHILIZED, FOR SOLUTION INTRAVENOUS at 10:02

## 2020-02-15 RX ADMIN — ACETAMINOPHEN 650 MG: 325 TABLET ORAL at 05:02

## 2020-02-15 RX ADMIN — POLYETHYLENE GLYCOL 3350 17 G: 17 POWDER, FOR SOLUTION ORAL at 09:02

## 2020-02-15 RX ADMIN — HYDRALAZINE HYDROCHLORIDE 25 MG: 25 TABLET, FILM COATED ORAL at 09:02

## 2020-02-15 RX ADMIN — POTASSIUM CHLORIDE 20 MEQ: 20 SOLUTION ORAL at 09:02

## 2020-02-15 RX ADMIN — POTASSIUM CHLORIDE 40 MEQ: 20 SOLUTION ORAL at 02:02

## 2020-02-15 RX ADMIN — ACETAMINOPHEN 650 MG: 325 TABLET ORAL at 01:02

## 2020-02-15 RX ADMIN — PANTOPRAZOLE SODIUM 40 MG: 40 TABLET, DELAYED RELEASE ORAL at 09:02

## 2020-02-15 RX ADMIN — DOBUTAMINE IN DEXTROSE 5 MCG/KG/MIN: 200 INJECTION, SOLUTION INTRAVENOUS at 04:02

## 2020-02-15 RX ADMIN — Medication 10 ML: at 05:02

## 2020-02-15 RX ADMIN — Medication 800 MG: at 09:02

## 2020-02-15 RX ADMIN — ASPIRIN 325 MG ORAL TABLET 325 MG: 325 PILL ORAL at 09:02

## 2020-02-15 RX ADMIN — Medication 10 ML: at 12:02

## 2020-02-15 RX ADMIN — FUROSEMIDE 20 MG/HR: 10 INJECTION, SOLUTION INTRAMUSCULAR; INTRAVENOUS at 02:02

## 2020-02-15 RX ADMIN — HEPARIN SODIUM AND DEXTROSE 1200 UNITS/HR: 10000; 5 INJECTION INTRAVENOUS at 05:02

## 2020-02-15 RX ADMIN — DOBUTAMINE IN DEXTROSE 5 MCG/KG/MIN: 200 INJECTION, SOLUTION INTRAVENOUS at 05:02

## 2020-02-15 RX ADMIN — HYDRALAZINE HYDROCHLORIDE 25 MG: 25 TABLET, FILM COATED ORAL at 05:02

## 2020-02-15 RX ADMIN — HYDRALAZINE HYDROCHLORIDE 25 MG: 25 TABLET, FILM COATED ORAL at 01:02

## 2020-02-15 RX ADMIN — DOCUSATE SODIUM 100 MG: 100 CAPSULE, LIQUID FILLED ORAL at 09:02

## 2020-02-15 RX ADMIN — FUROSEMIDE 20 MG/HR: 10 INJECTION, SOLUTION INTRAMUSCULAR; INTRAVENOUS at 09:02

## 2020-02-15 RX ADMIN — WARFARIN SODIUM 7.5 MG: 2.5 TABLET ORAL at 05:02

## 2020-02-15 RX ADMIN — AMLODIPINE BESYLATE 10 MG: 10 TABLET ORAL at 09:02

## 2020-02-15 RX ADMIN — FERROUS GLUCONATE TAB 324 MG (37.5 MG ELEMENTAL IRON) 324 MG: 324 (37.5 FE) TAB at 07:02

## 2020-02-15 RX ADMIN — FUROSEMIDE 15 MG/HR: 10 INJECTION, SOLUTION INTRAMUSCULAR; INTRAVENOUS at 09:02

## 2020-02-15 NOTE — PROGRESS NOTES
[facility]  Cardiothoracic Surgery  Progress Note    Patient Name: Saba Lott  MRN: [id number]  Admission Date: 1/15/2020  Hospital Length of Stay: 31 days  Code Status: Prior   Attending Physician: David Joshi MD   Referring Provider: Jacobo Lima MD  Principal Problem:Presence of left ventricular assist device (LVAD)      Subjective:     Post-Op Info:  Procedure(s) (LRB):  CLOSURE, WOUND, STERNUM (N/A)  WASHOUT  INSERTION, GRAFT, PERICARDIUM  APPLICATION, WOUND VAC   8 Days Post-Op     Interval History/Significant Events: NAEON. Epi weaned to off, Radha weaned to off, lasix gtt 20 and heparin therapeutic at 1200. UOP 3.090L over 24 hours.       Follow-up For: Procedure(s) (LRB):  CLOSURE, WOUND, STERNUM (N/A)  WASHOUT  INSERTION, GRAFT, PERICARDIUM  APPLICATION, WOUND VAC    Objective:     Vital Signs (Most Recent):  Temp: 98.3 °F (36.8 °C) (02/15/20 0300)  Pulse: 104 (02/15/20 0600)  Resp: (!) 25 (02/15/20 0600)  BP: 114/77 (02/15/20 0300)  SpO2: 97 % (02/15/20 0300) Vital Signs (24h Range):  Temp:  [98.1 °F (36.7 °C)-98.8 °F (37.1 °C)] 98.3 °F (36.8 °C)  Pulse:  [101-114] 104  Resp:  [12-42] 25  SpO2:  [97 %-99 %] 97 %  BP: ()/(0-79) 114/77  Arterial Line BP: ()/() 93/71     Weight: 76 kg (167 lb 8.8 oz)  Body mass index is 26.24 kg/m².      Intake/Output Summary (Last 24 hours) at 2/15/2020 0653  Last data filed at 2/15/2020 0600  Gross per 24 hour   Intake 1887.85 ml   Output 3385 ml   Net -1497.15 ml       Physical Exam   Constitutional: He is oriented to person, place, and time. He appears well-developed and well-nourished.   HENT:   Head: Normocephalic and atraumatic.   Eyes: Pupils are equal, round, and reactive to light. EOM are normal.   Neck: Normal range of motion. Neck supple.   Cardiovascular: Normal rate and regular rhythm.   LVAD in place  Introducer in place   Pulmonary/Chest: Effort normal. No respiratory distress.   Satting well on NC   Abdominal: Soft.    Neurological: He is alert and oriented to person, place, and time.   Skin: Skin is warm and dry.   Psychiatric: He has a normal mood and affect. His behavior is normal.   Nursing note and vitals reviewed.      Vents:  n/a    Lines/Drains/Airways     Peripherally Inserted Central Catheter Line                 PICC Triple Lumen 02/08/20 1530 right basilic 6 days          Central Venous Catheter Line                 Percutaneous Central Line Insertion/Assessment - Quad lumen  02/06/20 0742 8 days          Drain                 Urethral Catheter 02/12/20 1620 2 days          Arterial Line                 Arterial Line 02/06/20 0736 Left Other (Comment) 8 days          Line                 VAD 02/06/20 1047 Left ventricular assist device HeartMate 3 8 days                Significant Labs:    CBC/Anemia Profile:  Recent Labs   Lab 02/14/20  0400 02/15/20  0400   WBC 18.17* 18.67*   HGB 7.9* 7.5*   HCT 25.7* 24.6*    313   MCV 83 83   RDW 19.0* 18.9*        Chemistries:  Recent Labs   Lab 02/14/20  0400  02/14/20  1625 02/14/20  2200 02/15/20  0400     140   < > 138 135* 135*  135*   K 3.7  3.7  3.7   < > 3.9  3.9 4.3 4.4  4.4  4.4  4.4   CL 97  97   < > 98 92* 93*  93*   CO2 33*  33*   < > 30* 34* 34*  34*   BUN 42*  42*   < > 40* 40* 38*  38*   CREATININE 1.3  1.3   < > 1.2 1.2 1.2  1.2   CALCIUM 9.6  9.6   < > 8.6* 9.3 9.1  9.1   ALBUMIN 2.7*  2.7*  --   --   --  2.6*   PROT 7.9  7.9  --   --   --  7.8   BILITOT 1.0  1.0  --   --   --  0.7   ALKPHOS 121  121  --   --   --  125   ALT 36  36  --   --   --  29   AST 37  37  --   --   --  29   MG 2.2   < > 1.9 2.3 2.1   PHOS 3.7  --   --   --  2.8    < > = values in this interval not displayed.       ABGs:   Recent Labs   Lab 02/15/20  0542   PH 7.425   PCO2 54.4*   HCO3 35.7*   POCSATURATED 64*   BE 11     Coagulation:   Recent Labs   Lab 02/15/20  0400   INR 1.2   APTT 52.9*  52.9*  52.9*     All pertinent labs within the past 24  hours have been reviewed.    Significant Imaging:  I have reviewed and interpreted all pertinent imaging results/findings within the past 24 hours.     Assessment/Plan:     * Presence of left ventricular assist device (LVAD)  Non-ischemic cardiomyopathy  Neuro:   - Pain control: scheduled tylenol. Pain controlled without opioids  - No sedation.      Pulmonary:   - Extubated; satting well on NC, continue to wean as tolerated  - Radha -  weaned off  - CPT q6hrs  - Daily CXR pending     Cardiac:  - MAP goal >65  - Epi weaned off  -  at 5  - Cardene OFF  - amlodipine 10 qd  - Increased LVAD speed 5300 and flows to 4.9 per CTS  - LDH trending down  - PICC in place  - Lasix gtt at 20  - scheduled diuril 250 daily     Renal:     Intake/Output Summary (Last 24 hours) at 2/15/2020 0656  Last data filed at 2/15/2020 0600  Gross per 24 hour   Intake 1887.85 ml   Output 3385 ml   Net -1497.15 ml     - monitor UOP  - lasix gtt at 20  - BUN/ Cr stable at 38/1.2     Fluids/Electrolytes/Nutrition/GI:   -Nutritional status: mechanical soft diet  -bar Mg, K  - SLP c/s  - decreased 250 free water flushes to q8hrs  - bowel regimen: Miralax and docusate      Hematology/Oncology:  -H/H stable 7.5/24.6  -INR/Plts 1.2/313  -Trend CBC  -coumadin 8 at 5 pm   -hep @ 1200u/hr with PTT goal 45 - 54  -  mg qd     Infectious Disease:   -Afebrile  -Leukocytosis WBC 18.67, stable  -Abx: completed course of vanc and ancef     Endocrine:  -Glucose goal of 140-180  -Insulin gtt per endo     Dispo:  -Continue care in the ICU setting, plan for stepdown today        Pat Venegas MD  Cardiothoracic Surgery  [facility]

## 2020-02-15 NOTE — PLAN OF CARE
Goals remain appropriate.     Problem: Physical Therapy Goal  Goal: Physical Therapy Goal  Description  Goals to be met by: 3/9/2020     Patient will increase functional independence with mobility by performin. Supine to sit with CGA- not met  2. Sit to stand transfer with Supervision- not met  3. Gait  x 150 feet with Supervision - not met          Outcome: Ongoing, Progressing

## 2020-02-15 NOTE — SUBJECTIVE & OBJECTIVE
Interval History: No complaints this am. Feeling well today.    Continuous Infusions:   DOBUTamine 5 mcg/kg/min (02/15/20 1300)    furosemide (LASIX) 2 mg/mL continuous infusion (non-titrating) 20 mg/hr (02/15/20 1300)    heparin (porcine) in 5 % dex 1,200 Units/hr (02/15/20 1300)    nicardipine 2.5 mg/hr (02/11/20 0600)     Scheduled Meds:   acetaminophen  650 mg Oral Q6H    amLODIPine  10 mg Oral Daily    aspirin  325 mg Per NG tube Daily    chlorothiazide (DIURIL) IVPB  250 mg Intravenous Q24H    chlorothiazide (DIURIL) IVPB  500 mg Intravenous Once    docusate sodium  100 mg Oral BID    ferrous gluconate  324 mg Oral Daily with breakfast    hydrALAZINE  25 mg Oral Q8H    magnesium oxide  800 mg Oral TID    pantoprazole  40 mg Oral Daily    polyethylene glycol  17 g Oral Daily    potassium chloride 10%  20 mEq Per NG tube BID    sodium chloride 0.9%  10 mL Intravenous Q6H    warfarin  7.5 mg Oral Once     PRN Meds:albumin human 5%, albuterol sulfate, bisacodyL, Dextrose 10% Bolus, Dextrose 10% Bolus, Dextrose 10% Bolus, Dextrose 10% Bolus, diphenhydrAMINE, hydrALAZINE, magnesium hydroxide 400 mg/5 ml, magnesium sulfate IVPB, oxyCODONE, oxyCODONE, potassium chloride 10%, potassium chloride 10%, potassium chloride 10%, potassium chloride 10%, sodium chloride 0.9%, Flushing PICC Protocol **AND** sodium chloride 0.9% **AND** sodium chloride 0.9%    Review of patient's allergies indicates:   Allergen Reactions    Iodine and iodide containing products      Objective:     Vital Signs (Most Recent):  Temp: 97.7 °F (36.5 °C) (02/15/20 1100)  Pulse: (!) 112 (02/15/20 1300)  Resp: 18 (02/15/20 1300)  BP: (!) 84/0 (02/15/20 1100)  SpO2: 99 % (02/15/20 1129) Vital Signs (24h Range):  Temp:  [97.7 °F (36.5 °C)-98.8 °F (37.1 °C)] 97.7 °F (36.5 °C)  Pulse:  [102-114] 112  Resp:  [12-42] 18  SpO2:  [97 %-99 %] 99 %  BP: ()/(0-79) 84/0  Arterial Line BP: ()/() 104/83     Patient Vitals for the  past 72 hrs (Last 3 readings):   Weight   02/15/20 0300 76 kg (167 lb 8.8 oz)   02/14/20 0300 75 kg (165 lb 5.5 oz)   02/13/20 0500 78.6 kg (173 lb 4.5 oz)     Body mass index is 26.24 kg/m².      Intake/Output Summary (Last 24 hours) at 2/15/2020 1406  Last data filed at 2/15/2020 1300  Gross per 24 hour   Intake 2377.45 ml   Output 3745 ml   Net -1367.55 ml       Physical Exam   Constitutional: He is oriented to person, place, and time. He appears well-developed and well-nourished.   HENT:   Head: Normocephalic and atraumatic.   Cardiovascular: Normal rate and regular rhythm.   LVAD in place  Introducer in place   Pulmonary/Chest: Effort normal. No respiratory distress.   NC   Abdominal: Soft.   Genitourinary:   Genitourinary Comments: Mc cath in place   Neurological: He is alert and oriented to person, place, and time.   Skin: Skin is warm and dry.   Psychiatric: He has a normal mood and affect. His behavior is normal.   Nursing note and vitals reviewed.    Significant Labs:  CBC:  Recent Labs   Lab 02/13/20  0400 02/14/20  0400 02/15/20  0400   WBC 19.85* 18.17* 18.67*   RBC 3.31* 3.08* 2.97*   HGB 8.4* 7.9* 7.5*   HCT 28.3* 25.7* 24.6*    309 313   MCV 86 83 83   MCH 25.4* 25.6* 25.3*   MCHC 29.7* 30.7* 30.5*     BNP:  Recent Labs   Lab 02/10/20  0401 02/12/20  0400 02/14/20  0400   * 1,356* 808*     CMP:  Recent Labs   Lab 02/13/20  0400  02/14/20  0400  02/14/20  2200 02/15/20  0400 02/15/20  1000 02/15/20  1300   *   < > 119*  119*   < > 146* 162*  162* 137*  137*  --    CALCIUM 9.7   < > 9.6  9.6   < > 9.3 9.1  9.1 8.2*  8.2*  --    ALBUMIN 2.8*  --  2.7*  2.7*  --   --  2.6*  --   --    PROT 7.9  --  7.9  7.9  --   --  7.8  --   --       < > 140  140   < > 135* 135*  135* 138  138  --    K 4.3   < > 3.7  3.7  3.7   < > 4.3 4.4  4.4  4.4  4.4 3.8  3.8 3.7   CO2 29   < > 33*  33*   < > 34* 34*  34* 30*  30*  --       < > 97  97   < > 92* 93*  93* 98   98  --    BUN 55*   < > 42*  42*   < > 40* 38*  38* 35*  35*  --    CREATININE 1.5*   < > 1.3  1.3   < > 1.2 1.2  1.2 1.1  1.1  --    ALKPHOS 134  --  121  121  --   --  125  --   --    ALT 45*  --  36  36  --   --  29  --   --    AST 57*  --  37  37  --   --  29  --   --    BILITOT 0.9  --  1.0  1.0  --   --  0.7  --   --     < > = values in this interval not displayed.      Coagulation:   Recent Labs   Lab 02/13/20  0400  02/14/20  0400  02/14/20  2200 02/15/20  0400 02/15/20  1000   INR 1.2  --  1.3*  --   --  1.2  --    APTT 54.5*   < > 49.0*  49.0*  49.0*   < > 49.5* 52.9*  52.9*  52.9* 42.6*  42.6*    < > = values in this interval not displayed.     LDH:  Recent Labs   Lab 02/13/20  0400 02/14/20  0400 02/15/20  0400   * 433* 362*     Microbiology:  Microbiology Results (last 7 days)     ** No results found for the last 168 hours. **        I have reviewed all pertinent labs within the past 24 hours.    Estimated Creatinine Clearance: 70.9 mL/min (based on SCr of 1.1 mg/dL).    Diagnostic Results:  I have reviewed and interpreted all pertinent imaging results/findings within the past 24 hours.

## 2020-02-15 NOTE — ASSESSMENT & PLAN NOTE
-HeartMate 3 Implanted 02/06/2020 as DT. Chest closed 2/7.   -CTS Primary.  -Implanted by Dr. Joshi.  -INR sub therapeutic Coumadin, Goal INR 2.0-3.0. Anticoag per CTS.   -Antiplatelets  mg.  -LDH is stable overall today. Will continue to monitor daily.  -Continues on  and lasix drips  -Speed set at 5200, LSL 4800 rpm.  -Not listed for OHTx.  Procedure: Device Interrogation Including analysis of device parameters.  Current Settings: Ventricular Assist Device.  Review of device function is stable/unstable stable.    TXP LVAD INTERROGATIONS 2/15/2020 2/15/2020 2/15/2020 2/15/2020 2/15/2020 2/15/2020 2/15/2020   Type HeartMate3 HeartMate3 HeartMate3 HeartMate3 HeartMate3 HeartMate3 HeartMate3   Flow 4.6 - - 4.6 4.7 4.6 4.6   Speed 5300 5300 5300 5300 5300 5300 5300   PI 3.3 - - 3.4 3.5 3.4 3.4   Power (Centeno) 3.9 - - 3.7 3.5 4 3.8   LSL 4900 4900 4900 4900 4900 4900 4900   Pulsatility - Intermittent pulse Intermittent pulse Intermittent pulse Intermittent pulse Intermittent pulse Intermittent pulse

## 2020-02-15 NOTE — SUBJECTIVE & OBJECTIVE
Interval History/Significant Events: NAEON. Epi weaned to off, Radha weaned to off, lasix gtt 20 and heparin therapeutic at 1200. UOP 3.090L over 24 hours.       Follow-up For: Procedure(s) (LRB):  CLOSURE, WOUND, STERNUM (N/A)  WASHOUT  INSERTION, GRAFT, PERICARDIUM  APPLICATION, WOUND VAC    Objective:     Vital Signs (Most Recent):  Temp: 98.3 °F (36.8 °C) (02/15/20 0300)  Pulse: 104 (02/15/20 0600)  Resp: (!) 25 (02/15/20 0600)  BP: 114/77 (02/15/20 0300)  SpO2: 97 % (02/15/20 0300) Vital Signs (24h Range):  Temp:  [98.1 °F (36.7 °C)-98.8 °F (37.1 °C)] 98.3 °F (36.8 °C)  Pulse:  [101-114] 104  Resp:  [12-42] 25  SpO2:  [97 %-99 %] 97 %  BP: ()/(0-79) 114/77  Arterial Line BP: ()/() 93/71     Weight: 76 kg (167 lb 8.8 oz)  Body mass index is 26.24 kg/m².      Intake/Output Summary (Last 24 hours) at 2/15/2020 0653  Last data filed at 2/15/2020 0600  Gross per 24 hour   Intake 1887.85 ml   Output 3385 ml   Net -1497.15 ml       Physical Exam   Constitutional: He is oriented to person, place, and time. He appears well-developed and well-nourished.   HENT:   Head: Normocephalic and atraumatic.   Eyes: Pupils are equal, round, and reactive to light. EOM are normal.   Neck: Normal range of motion. Neck supple.   Cardiovascular: Normal rate and regular rhythm.   LVAD in place  Introducer in place   Pulmonary/Chest: Effort normal. No respiratory distress.   Satting well on NC   Abdominal: Soft.   Neurological: He is alert and oriented to person, place, and time.   Skin: Skin is warm and dry.   Psychiatric: He has a normal mood and affect. His behavior is normal.   Nursing note and vitals reviewed.      Vents:  n/a    Lines/Drains/Airways     Peripherally Inserted Central Catheter Line                 PICC Triple Lumen 02/08/20 1530 right basilic 6 days          Central Venous Catheter Line                 Percutaneous Central Line Insertion/Assessment - Quad lumen  02/06/20 0742 8 days          Drain                  Urethral Catheter 02/12/20 1620 2 days          Arterial Line                 Arterial Line 02/06/20 0736 Left Other (Comment) 8 days          Line                 VAD 02/06/20 1047 Left ventricular assist device HeartMate 3 8 days                Significant Labs:    CBC/Anemia Profile:  Recent Labs   Lab 02/14/20  0400 02/15/20  0400   WBC 18.17* 18.67*   HGB 7.9* 7.5*   HCT 25.7* 24.6*    313   MCV 83 83   RDW 19.0* 18.9*        Chemistries:  Recent Labs   Lab 02/14/20  0400  02/14/20  1625 02/14/20  2200 02/15/20  0400     140   < > 138 135* 135*  135*   K 3.7  3.7  3.7   < > 3.9  3.9 4.3 4.4  4.4  4.4  4.4   CL 97  97   < > 98 92* 93*  93*   CO2 33*  33*   < > 30* 34* 34*  34*   BUN 42*  42*   < > 40* 40* 38*  38*   CREATININE 1.3  1.3   < > 1.2 1.2 1.2  1.2   CALCIUM 9.6  9.6   < > 8.6* 9.3 9.1  9.1   ALBUMIN 2.7*  2.7*  --   --   --  2.6*   PROT 7.9  7.9  --   --   --  7.8   BILITOT 1.0  1.0  --   --   --  0.7   ALKPHOS 121  121  --   --   --  125   ALT 36  36  --   --   --  29   AST 37  37  --   --   --  29   MG 2.2   < > 1.9 2.3 2.1   PHOS 3.7  --   --   --  2.8    < > = values in this interval not displayed.       ABGs:   Recent Labs   Lab 02/15/20  0542   PH 7.425   PCO2 54.4*   HCO3 35.7*   POCSATURATED 64*   BE 11     Coagulation:   Recent Labs   Lab 02/15/20  0400   INR 1.2   APTT 52.9*  52.9*  52.9*     All pertinent labs within the past 24 hours have been reviewed.    Significant Imaging:  I have reviewed and interpreted all pertinent imaging results/findings within the past 24 hours.

## 2020-02-15 NOTE — PT/OT/SLP PROGRESS
Physical Therapy Treatment    Patient Name:  Saba Lott   MRN:  [id number]    Recommendations:     Discharge Recommendations:  home health PT   Discharge Equipment Recommendations: (TBD)   Barriers to discharge: None    Assessment:     Saba Lott is a 54 y.o. male admitted with a medical diagnosis of Presence of left ventricular assist device (LVAD).  He presents with the following impairments/functional limitations:  weakness, impaired endurance, impaired self care skills, impaired functional mobilty, gait instability, impaired balance, decreased lower extremity function, pain, impaired cardiopulmonary response to activity. Pt tolerates session well with focus on bed mobility, transfers, standing balance, and gait training. Pt continues to progress but with focus on transfers and balance this day d/t several brief pauses for line mgmt limiting gait training. Pts NSG in to assist with IV lines found to be wrapped around driveline and wound vac tubing disconnected from vac system. Pt ultimately with c/o fatigue and request to return to bed further limiting gait training. Pt does demonstrate good static standing balance with periods of SBA but mostly close CGA for guarding. Pt demonstrates slowly improving knowledge of LVAD system and components with continued education needed each session. Pt will continue to benefit from therapy services to address impairments listed above.     Rehab Prognosis: Good; patient would benefit from acute skilled PT services to address these deficits and reach maximum level of function.    Recent Surgery: Procedure(s) (LRB):  CLOSURE, WOUND, STERNUM (N/A)  WASHOUT  INSERTION, GRAFT, PERICARDIUM  APPLICATION, WOUND VAC 8 Days Post-Op    Plan:     During this hospitalization, patient to be seen 6 x/week to address the identified rehab impairments via gait training, therapeutic activities, therapeutic exercises, neuromuscular re-education and progress toward the following goals:    · Plan  "of Care Expires:  03/10/20    Subjective     Chief Complaint: fatigue  Patient/Family Comments/goals: "I want to get back to bed."  Pain/Comfort:  · Pain Rating 1: 0/10  · Pain Rating Post-Intervention 1: 0/10      Objective:     Communicated with NSG prior to session.  Patient found up in chair with telemetry, pulse ox (continuous), LVAD, PICC line, barton catheter, arterial line, wound vac upon PTA entry to room.     NSG present in and out of room throughout session. Present to assist with line management as throughout session pt discovered to have IV lines wrapped around driveline and wound vac tubing disconnected from vac unit.     General Precautions: Standard, fall, LVAD, sternal, aspiration   Orthopedic Precautions:N/A   Braces: N/A     Functional Mobility:  · Bed Mobility:     · Sit to Supine: minimum assistance  · Transfers:     · Sit to Stand:  minimum assistance with hand-held assist  · Bed to Chair: minimum assistance with  hand-held assist  using  Stand Pivot  · Gait: Pt ambulates ~10 ft along EOB with no AD and close CGA. Pt requires guarding and cues for safety. Therapist managing lines with pt found to be limited by IV lines wrapped around driveline. Pt returned to sitting for safety. IV lines detangled by NSG. Pt with request to return to bed on next attempt to initiate gait trial.       AM-PAC 6 CLICK MOBILITY  Turning over in bed (including adjusting bedclothes, sheets and blankets)?: 3  Sitting down on and standing up from a chair with arms (e.g., wheelchair, bedside commode, etc.): 3  Moving from lying on back to sitting on the side of the bed?: 3  Moving to and from a bed to a chair (including a wheelchair)?: 3  Need to walk in hospital room?: 3  Climbing 3-5 steps with a railing?: 1  Basic Mobility Total Score: 16       Therapeutic Activities and Exercises:   Pt assisted with functional mobility as noted above.   Pts vitals WNL throughout session.   Pt requiring increased time for mgmt of lines " and overall slow pace this session with pt taking steps along EOB only this day. Pt returned to supine per request.   Pt requires Mod A for cues to identify LVAD components this day. Pt cued throughout session on driveline safety with pt pulling controller across body with little to no slack on driveline.   Pt educated on LVAD system components, daily tasks and management, and importance of line safety.   Pt educated on sternal precautions and cued throughout session with several attempts to use BUE for mobility.   No alarms sounded throughout session.     Patient left HOB elevated with all lines intact and call button in reach..    GOALS:   Multidisciplinary Problems     Physical Therapy Goals        Problem: Physical Therapy Goal    Goal Priority Disciplines Outcome Goal Variances Interventions   Physical Therapy Goal     PT, PT/OT Ongoing, Progressing     Description:  Goals to be met by: 3/9/2020     Patient will increase functional independence with mobility by performin. Supine to sit with CGA- not met  2. Sit to stand transfer with Supervision- not met  3. Gait  x 150 feet with Supervision - not met                           Time Tracking:     PT Received On: 02/15/20  PT Start Time: 1002     PT Stop Time: 1040  PT Total Time (min): 38 min     Billable Minutes: Gait Training 12 and Therapeutic Activity 26    Treatment Type: Treatment  PT/PTA: PTA     PTA Visit Number: 1     Hernan Alcantara PTA  02/15/2020

## 2020-02-15 NOTE — PLAN OF CARE
Plan of Care Note  Cardiothoracic Surgery    Saba Lott is a 54 y.o. male with heart failure who underwent LVAD placement (2/6/20) and chest closure (2/7/20).    Specific issues: coumadin 7.5 today, decrease lasix gtt to 15 from 20    Plan of care for patient was discussed with ICU staff including nurses, residents, and faculty and appropriate consulting services.    Will continue to monitor patient's hemodynamics, functionality, neuro status, fluid status and renal function, and labs and will adjust medications and fluids as necessary while monitoring appropriateness for de-escalation of support and monitoring and transport to stepdown unit.    Roselyn Salazar MD  Cardiac Surgery Resident, PGY7  Cardiothoracic Surgery  [facility]

## 2020-02-15 NOTE — PLAN OF CARE
"Dx: Presence of left ventricular assist device (LVAD)  Neuro: Follows Commands, Moves All Extremities, AAOx4  Vital Signs: /70 (BP Location: Left arm, Patient Position: Lying)   Pulse 100   Temp 98.7 °F (37.1 °C) (Oral)   Resp (!) 22   Ht 5' 7" (1.702 m)   Wt 75 kg (165 lb 5.5 oz)   SpO2 96%   BMI 25.90 kg/m²   Afebrile  VSS, MAP maintained 65-85  Respiratory: Nasal Cannula- 4L  SpO2 checked intermittenly  Diet: Dental Soft  Gtts: Epinephrine, Dobutamine, Lasix and Heparin  PTT remains within goal 45-54  Epi off at 0500  Urine Output: Urinary Catheter  cc/hour  VAD   HM3, Speed 5300, No VAD alarms noted, see flowsheet for exact hourly values  Labs/Accuchecks: Accu AC/HS no coverage needed, Serial labs monitored/daily labs sent, K replaced as needed  Skin: No redness or breakdown noted on sacrum/elbows. Pt. Changes position with minimal assistance. VAD dsg remains with serosang drainage- to be changed per day RN. Remains intact.   "

## 2020-02-15 NOTE — NURSING
Patient has been stable throughout shift.   Ambulated to chair x2 via bedside nurse.   VAD numbers WDL.  Will continue to monitor patients status.   Please see flowsheet data for full assessment details

## 2020-02-15 NOTE — PROGRESS NOTES
[facility]  Heart Transplant  Progress Note    Patient Name: Saba Lott  MRN: [id number]  Admission Date: 1/15/2020  Hospital Length of Stay: 31 days  Attending Physician: David Joshi MD  Primary Care Provider: Primary Doctor No  Principal Problem:Presence of left ventricular assist device (LVAD)    Subjective:     Interval History: No complaints this am. Feeling well today.    Continuous Infusions:   DOBUTamine 5 mcg/kg/min (02/15/20 1300)    furosemide (LASIX) 2 mg/mL continuous infusion (non-titrating) 20 mg/hr (02/15/20 1300)    heparin (porcine) in 5 % dex 1,200 Units/hr (02/15/20 1300)    nicardipine 2.5 mg/hr (02/11/20 0600)     Scheduled Meds:   acetaminophen  650 mg Oral Q6H    amLODIPine  10 mg Oral Daily    aspirin  325 mg Per NG tube Daily    chlorothiazide (DIURIL) IVPB  250 mg Intravenous Q24H    chlorothiazide (DIURIL) IVPB  500 mg Intravenous Once    docusate sodium  100 mg Oral BID    ferrous gluconate  324 mg Oral Daily with breakfast    hydrALAZINE  25 mg Oral Q8H    magnesium oxide  800 mg Oral TID    pantoprazole  40 mg Oral Daily    polyethylene glycol  17 g Oral Daily    potassium chloride 10%  20 mEq Per NG tube BID    sodium chloride 0.9%  10 mL Intravenous Q6H    warfarin  7.5 mg Oral Once     PRN Meds:albumin human 5%, albuterol sulfate, bisacodyL, Dextrose 10% Bolus, Dextrose 10% Bolus, Dextrose 10% Bolus, Dextrose 10% Bolus, diphenhydrAMINE, hydrALAZINE, magnesium hydroxide 400 mg/5 ml, magnesium sulfate IVPB, oxyCODONE, oxyCODONE, potassium chloride 10%, potassium chloride 10%, potassium chloride 10%, potassium chloride 10%, sodium chloride 0.9%, Flushing PICC Protocol **AND** sodium chloride 0.9% **AND** sodium chloride 0.9%    Review of patient's allergies indicates:   Allergen Reactions    Iodine and iodide containing products      Objective:     Vital Signs (Most Recent):  Temp: 97.7 °F (36.5 °C) (02/15/20 1100)  Pulse: (!) 112 (02/15/20  1300)  Resp: 18 (02/15/20 1300)  BP: (!) 84/0 (02/15/20 1100)  SpO2: 99 % (02/15/20 1129) Vital Signs (24h Range):  Temp:  [97.7 °F (36.5 °C)-98.8 °F (37.1 °C)] 97.7 °F (36.5 °C)  Pulse:  [102-114] 112  Resp:  [12-42] 18  SpO2:  [97 %-99 %] 99 %  BP: ()/(0-79) 84/0  Arterial Line BP: ()/() 104/83     Patient Vitals for the past 72 hrs (Last 3 readings):   Weight   02/15/20 0300 76 kg (167 lb 8.8 oz)   02/14/20 0300 75 kg (165 lb 5.5 oz)   02/13/20 0500 78.6 kg (173 lb 4.5 oz)     Body mass index is 26.24 kg/m².      Intake/Output Summary (Last 24 hours) at 2/15/2020 1406  Last data filed at 2/15/2020 1300  Gross per 24 hour   Intake 2377.45 ml   Output 3745 ml   Net -1367.55 ml       Physical Exam   Constitutional: He is oriented to person, place, and time. He appears well-developed and well-nourished.   HENT:   Head: Normocephalic and atraumatic.   Cardiovascular: Normal rate and regular rhythm.   LVAD in place  Pulmonary/Chest: Effort normal. No respiratory distress.   NC   Abdominal: Soft.   Genitourinary:   Neurological: He is alert and oriented to person, place, and time.   Skin: Skin is warm and dry.   Psychiatric: He has a normal mood and affect. His behavior is normal.   Nursing note and vitals reviewed.    Significant Labs:  CBC:  Recent Labs   Lab 02/13/20  0400 02/14/20  0400 02/15/20  0400   WBC 19.85* 18.17* 18.67*   RBC 3.31* 3.08* 2.97*   HGB 8.4* 7.9* 7.5*   HCT 28.3* 25.7* 24.6*    309 313   MCV 86 83 83   MCH 25.4* 25.6* 25.3*   MCHC 29.7* 30.7* 30.5*     BNP:  Recent Labs   Lab 02/10/20  0401 02/12/20  0400 02/14/20  0400   * 1,356* 808*     CMP:  Recent Labs   Lab 02/13/20  0400  02/14/20  0400  02/14/20  2200 02/15/20  0400 02/15/20  1000 02/15/20  1300   *   < > 119*  119*   < > 146* 162*  162* 137*  137*  --    CALCIUM 9.7   < > 9.6  9.6   < > 9.3 9.1  9.1 8.2*  8.2*  --    ALBUMIN 2.8*  --  2.7*  2.7*  --   --  2.6*  --   --    PROT 7.9  --  7.9   7.9  --   --  7.8  --   --       < > 140  140   < > 135* 135*  135* 138  138  --    K 4.3   < > 3.7  3.7  3.7   < > 4.3 4.4  4.4  4.4  4.4 3.8  3.8 3.7   CO2 29   < > 33*  33*   < > 34* 34*  34* 30*  30*  --       < > 97  97   < > 92* 93*  93* 98  98  --    BUN 55*   < > 42*  42*   < > 40* 38*  38* 35*  35*  --    CREATININE 1.5*   < > 1.3  1.3   < > 1.2 1.2  1.2 1.1  1.1  --    ALKPHOS 134  --  121  121  --   --  125  --   --    ALT 45*  --  36  36  --   --  29  --   --    AST 57*  --  37  37  --   --  29  --   --    BILITOT 0.9  --  1.0  1.0  --   --  0.7  --   --     < > = values in this interval not displayed.      Coagulation:   Recent Labs   Lab 02/13/20  0400  02/14/20  0400  02/14/20  2200 02/15/20  0400 02/15/20  1000   INR 1.2  --  1.3*  --   --  1.2  --    APTT 54.5*   < > 49.0*  49.0*  49.0*   < > 49.5* 52.9*  52.9*  52.9* 42.6*  42.6*    < > = values in this interval not displayed.     LDH:  Recent Labs   Lab 02/13/20  0400 02/14/20  0400 02/15/20  0400   * 433* 362*     Microbiology:  Microbiology Results (last 7 days)     ** No results found for the last 168 hours. **        I have reviewed all pertinent labs within the past 24 hours.    Estimated Creatinine Clearance: 70.9 mL/min (based on SCr of 1.1 mg/dL).    Diagnostic Results:  I have reviewed and interpreted all pertinent imaging results/findings within the past 24 hours.    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 Chronic MARC - Class II-III and mild LE edema.      Hospital Course (synopsis of major diagnoses, care, treatment, and services provided during the course of the hospital stay):  -2/12 admit to CDU for diuresis with IV lasix  -IV abx   -CXR with suspected small left pleural effusion with  associated left basilar atelectasis versus evolving airspace disease  -2/13 single chamber ICD implant; IV lasix decreased to BID  -2/16 add dobutamine, hold BB due to hypotension, no ACE-I or ARB due to recent HPI hypotension  -2/17 Lasix changed to PO  -2/18 dobutamine discontinued    Admitted to [facility] on Thursday due to severe SOB for a week with associated orthopnea, MARC, and PND unable to lie flat and found to be in acute diastolic heart failure. States he normally weighs around 219 but up to 254lb on admission tonight. Says he hasn't been the most compliant in terms of fluid restriction and daily weights but has avoided salt. BNP on admission was 2375. BUN/CRT 32/1.4 He was started on IV diuretics but continued to deteriorate. Creatinine continued to increase and reached 4.3 with oliguria. He was started on CRRT for a few days and tolerated well. Renal u/s was negative for obstruction and liver u/s without findings of cirrhosis. All of this was progression of cardiorenal syndrome with liver congestion from severe volume overload. On arrival vitals stable and in no acute distress. He has obvious anasarca up to the chest. He did have uop of 500 at time of arrival also.    TTE 5/28/19  · Moderate left ventricular enlargement.  · Severely decreased left ventricular systolic function. The estimated ejection fraction is 20%  · Global hypokinetic wall motion.  · Grade III (severe) left ventricular diastolic dysfunction consistent with restrictive physiology.  · Severe left atrial enlargement.  · Moderate right ventricular enlargement.  · Low normal right ventricular systolic function.  · Mild right atrial enlargement.  · Moderate mitral regurgitation.  Mild to moderate tricuspid regurgitation    * Presence of left ventricular assist device (LVAD)  -HeartMate 3 Implanted 02/06/2020 as DT. Chest closed 2/7.   -CTS Primary.  -Implanted by Dr. Joshi.  -INR sub therapeutic Coumadin, Goal INR 2.0-3.0.  Anticoag per CTS.   -Antiplatelets  mg.  -LDH is stable overall today. Will continue to monitor daily.  -Continues on  and lasix drips  -Speed set at 5200, LSL 4800 rpm.  -Not listed for OHTx.  Procedure: Device Interrogation Including analysis of device parameters.  Current Settings: Ventricular Assist Device.  Review of device function is stable/unstable stable.    TXP LVAD INTERROGATIONS 2/15/2020 2/15/2020 2/15/2020 2/15/2020 2/15/2020 2/15/2020 2/15/2020   Type HeartMate3 HeartMate3 HeartMate3 HeartMate3 HeartMate3 HeartMate3 HeartMate3   Flow 4.6 - - 4.6 4.7 4.6 4.6   Speed 5300 5300 5300 5300 5300 5300 5300   PI 3.3 - - 3.4 3.5 3.4 3.4   Power (Centeno) 3.9 - - 3.7 3.5 4 3.8   LSL 4900 4900 4900 4900 4900 4900 4900   Pulsatility - Intermittent pulse Intermittent pulse Intermittent pulse Intermittent pulse Intermittent pulse Intermittent pulse       Acute hyperglycemia  -HgA1C 6.6  -Endocrine following    Moderate malnutrition  - Boost glucose control added 1/27   - Prealbumin is 23    Morbid obesity  -Weight down considerably since admit with diuresis.    ANJELICA (acute kidney injury)  -Creatinine was 3.0 on admit and got as high as 4.3 at OSH prior to transfer. Renal function was normal 8 months ago.  -Trending down today.     Cardiogenic shock  -NICM; Likely Etoh induced  -Transferred from [facility] with IABP at 1:1 for further level of care. IABP removed 1/25 and replaced 2/3.   -S/P HMIII on 2/3.     Deep vein thrombosis (DVT) of right lower extremity  -Unsure of timing but was on eliquis PTA.   -Will anticoag with heparin/warfarin post VAD.     AICD (automatic cardioverter/defibrillator) present  -Medtronic single chamber ICD placed 2019 for primary prevention      Liu Osorio MD  Heart Transplant  [facility]

## 2020-02-15 NOTE — PLAN OF CARE
"Dx: Presence of left ventricular assist device (LVAD)    Neuro: AAO x4, Follows Commands and Moves All Extremities    Vital Signs: /79   Pulse 108   Temp 98.8 °F (37.1 °C) (Oral)   Resp 17   Ht 5' 7" (1.702 m)   Wt 75 kg (165 lb 5.5 oz)   SpO2 99%   BMI 25.90 kg/m²     Diet: Dental Soft    Gtts: Epinephrine, Dobutamine, Lasix and Heparin    Urine Output: Urinary Catheter  cc/hour    Drains:     VAD see flowsheet              "

## 2020-02-15 NOTE — ASSESSMENT & PLAN NOTE
Non-ischemic cardiomyopathy  Neuro:   - Pain control: scheduled tylenol. Pain controlled without opioids  - No sedation.      Pulmonary:   - Extubated; satting well on NC, continue to wean as tolerated  - Radha - weaned off  - CPT q6hrs  - Daily CXR pending     Cardiac:  - MAP goal >65  - Epi weaned off  -  at 5  - Cardene OFF  - amlodipine 10 qd  - Increased LVAD speed 5300 and flows to 4.9 per CTS  - LDH trending down  - PICC in place  - Lasix gtt at 20  - scheduled diuril 250 daily     Renal:     Intake/Output Summary (Last 24 hours) at 2/15/2020 0656  Last data filed at 2/15/2020 0600  Gross per 24 hour   Intake 1887.85 ml   Output 3385 ml   Net -1497.15 ml     - monitor UOP  - lasix gtt at 20  - BUN/ Cr stable at 38/1.2     Fluids/Electrolytes/Nutrition/GI:   -Nutritional status: mechanical soft diet  -bar Mg, K  - SLP c/s  - decreased 250 free water flushes to q8hrs  - bowel regimen: Miralax and docusate      Hematology/Oncology:  -H/H stable 7.5/24.6  -INR/Plts 1.2/313  -Trend CBC  -coumadin 8 at 5 pm   -hep @ 1200u/hr with PTT goal 45 - 54  -  mg qd     Infectious Disease:   -Afebrile  -Leukocytosis WBC 18.67, stable  -Abx: completed course of vanc and ancef     Endocrine:  -Glucose goal of 140-180  -Insulin gtt per endo     Dispo:  -Continue care in the ICU setting, plan for stepdown today

## 2020-02-16 LAB
ALBUMIN SERPL BCP-MCNC: 2.4 G/DL (ref 3.5–5.2)
ALP SERPL-CCNC: 117 U/L (ref 55–135)
ALT SERPL W/O P-5'-P-CCNC: 23 U/L (ref 10–44)
ANION GAP SERPL CALC-SCNC: 12 MMOL/L (ref 8–16)
ANION GAP SERPL CALC-SCNC: 12 MMOL/L (ref 8–16)
ANION GAP SERPL CALC-SCNC: 9 MMOL/L (ref 8–16)
APTT BLDCRRT: 29.2 SEC (ref 21–32)
APTT BLDCRRT: 40.7 SEC (ref 21–32)
APTT BLDCRRT: 42.6 SEC (ref 21–32)
APTT BLDCRRT: 63.9 SEC (ref 21–32)
AST SERPL-CCNC: 25 U/L (ref 10–40)
BASOPHILS # BLD AUTO: 0.08 K/UL (ref 0–0.2)
BASOPHILS NFR BLD: 0.4 % (ref 0–1.9)
BILIRUB DIRECT SERPL-MCNC: 0.5 MG/DL (ref 0.1–0.3)
BILIRUB SERPL-MCNC: 0.7 MG/DL (ref 0.1–1)
BUN SERPL-MCNC: 28 MG/DL (ref 6–20)
BUN SERPL-MCNC: 30 MG/DL (ref 6–20)
BUN SERPL-MCNC: 33 MG/DL (ref 6–20)
BUN SERPL-MCNC: 37 MG/DL (ref 6–20)
BUN SERPL-MCNC: 37 MG/DL (ref 6–20)
CALCIUM SERPL-MCNC: 7.5 MG/DL (ref 8.7–10.5)
CALCIUM SERPL-MCNC: 8.7 MG/DL (ref 8.7–10.5)
CALCIUM SERPL-MCNC: 9 MG/DL (ref 8.7–10.5)
CALCIUM SERPL-MCNC: 9 MG/DL (ref 8.7–10.5)
CALCIUM SERPL-MCNC: 9.2 MG/DL (ref 8.7–10.5)
CHLORIDE SERPL-SCNC: 104 MMOL/L (ref 95–110)
CHLORIDE SERPL-SCNC: 95 MMOL/L (ref 95–110)
CHLORIDE SERPL-SCNC: 96 MMOL/L (ref 95–110)
CHLORIDE SERPL-SCNC: 97 MMOL/L (ref 95–110)
CHLORIDE SERPL-SCNC: 97 MMOL/L (ref 95–110)
CO2 SERPL-SCNC: 27 MMOL/L (ref 23–29)
CO2 SERPL-SCNC: 32 MMOL/L (ref 23–29)
CO2 SERPL-SCNC: 34 MMOL/L (ref 23–29)
CREAT SERPL-MCNC: 0.8 MG/DL (ref 0.5–1.4)
CREAT SERPL-MCNC: 1.2 MG/DL (ref 0.5–1.4)
DIFFERENTIAL METHOD: ABNORMAL
EOSINOPHIL # BLD AUTO: 0.6 K/UL (ref 0–0.5)
EOSINOPHIL NFR BLD: 3 % (ref 0–8)
ERYTHROCYTE [DISTWIDTH] IN BLOOD BY AUTOMATED COUNT: 19.2 % (ref 11.5–14.5)
EST. GFR  (AFRICAN AMERICAN): >60 ML/MIN/1.73 M^2
EST. GFR  (NON AFRICAN AMERICAN): >60 ML/MIN/1.73 M^2
GLUCOSE SERPL-MCNC: 112 MG/DL (ref 70–110)
GLUCOSE SERPL-MCNC: 138 MG/DL (ref 70–110)
GLUCOSE SERPL-MCNC: 154 MG/DL (ref 70–110)
GLUCOSE SERPL-MCNC: 156 MG/DL (ref 70–110)
GLUCOSE SERPL-MCNC: 156 MG/DL (ref 70–110)
HCT VFR BLD AUTO: 23.8 % (ref 40–54)
HGB BLD-MCNC: 7.3 G/DL (ref 14–18)
IMM GRANULOCYTES # BLD AUTO: 0.83 K/UL (ref 0–0.04)
IMM GRANULOCYTES NFR BLD AUTO: 4.4 % (ref 0–0.5)
INR PPP: 1.2 (ref 0.8–1.2)
LDH SERPL L TO P-CCNC: 381 U/L (ref 110–260)
LYMPHOCYTES # BLD AUTO: 2 K/UL (ref 1–4.8)
LYMPHOCYTES NFR BLD: 10.7 % (ref 18–48)
MAGNESIUM SERPL-MCNC: 1.6 MG/DL (ref 1.6–2.6)
MAGNESIUM SERPL-MCNC: 2.1 MG/DL (ref 1.6–2.6)
MAGNESIUM SERPL-MCNC: 2.1 MG/DL (ref 1.6–2.6)
MAGNESIUM SERPL-MCNC: 2.4 MG/DL (ref 1.6–2.6)
MCH RBC QN AUTO: 25.4 PG (ref 27–31)
MCHC RBC AUTO-ENTMCNC: 30.7 G/DL (ref 32–36)
MCV RBC AUTO: 83 FL (ref 82–98)
MONOCYTES # BLD AUTO: 0.9 K/UL (ref 0.3–1)
MONOCYTES NFR BLD: 4.9 % (ref 4–15)
NEUTROPHILS # BLD AUTO: 14.6 K/UL (ref 1.8–7.7)
NEUTROPHILS NFR BLD: 76.6 % (ref 38–73)
NRBC BLD-RTO: 1 /100 WBC
PHOSPHATE SERPL-MCNC: 1.9 MG/DL (ref 2.7–4.5)
PLATELET # BLD AUTO: 348 K/UL (ref 150–350)
PMV BLD AUTO: 9.1 FL (ref 9.2–12.9)
POCT GLUCOSE: 128 MG/DL (ref 70–110)
POCT GLUCOSE: 144 MG/DL (ref 70–110)
POTASSIUM SERPL-SCNC: 3 MMOL/L (ref 3.5–5.1)
POTASSIUM SERPL-SCNC: 3.9 MMOL/L (ref 3.5–5.1)
POTASSIUM SERPL-SCNC: 4 MMOL/L (ref 3.5–5.1)
POTASSIUM SERPL-SCNC: 4 MMOL/L (ref 3.5–5.1)
POTASSIUM SERPL-SCNC: 4.1 MMOL/L (ref 3.5–5.1)
POTASSIUM SERPL-SCNC: 4.9 MMOL/L (ref 3.5–5.1)
PROT SERPL-MCNC: 7.7 G/DL (ref 6–8.4)
PROTHROMBIN TIME: 12.3 SEC (ref 9–12.5)
RBC # BLD AUTO: 2.87 M/UL (ref 4.6–6.2)
SODIUM SERPL-SCNC: 136 MMOL/L (ref 136–145)
SODIUM SERPL-SCNC: 139 MMOL/L (ref 136–145)
SODIUM SERPL-SCNC: 140 MMOL/L (ref 136–145)
SODIUM SERPL-SCNC: 141 MMOL/L (ref 136–145)
SODIUM SERPL-SCNC: 141 MMOL/L (ref 136–145)
WBC # BLD AUTO: 19 K/UL (ref 3.9–12.7)

## 2020-02-16 PROCEDURE — 80076 HEPATIC FUNCTION PANEL: CPT

## 2020-02-16 PROCEDURE — 63600175 PHARM REV CODE 636 W HCPCS: Performed by: STUDENT IN AN ORGANIZED HEALTH CARE EDUCATION/TRAINING PROGRAM

## 2020-02-16 PROCEDURE — 84132 ASSAY OF SERUM POTASSIUM: CPT | Mod: 91

## 2020-02-16 PROCEDURE — 25000003 PHARM REV CODE 250: Performed by: STUDENT IN AN ORGANIZED HEALTH CARE EDUCATION/TRAINING PROGRAM

## 2020-02-16 PROCEDURE — 63600175 PHARM REV CODE 636 W HCPCS: Performed by: SURGERY

## 2020-02-16 PROCEDURE — A4216 STERILE WATER/SALINE, 10 ML: HCPCS | Performed by: THORACIC SURGERY (CARDIOTHORACIC VASCULAR SURGERY)

## 2020-02-16 PROCEDURE — 99232 PR SUBSEQUENT HOSPITAL CARE,LEVL II: ICD-10-PCS | Mod: ,,, | Performed by: INTERNAL MEDICINE

## 2020-02-16 PROCEDURE — 84100 ASSAY OF PHOSPHORUS: CPT

## 2020-02-16 PROCEDURE — 80048 BASIC METABOLIC PNL TOTAL CA: CPT

## 2020-02-16 PROCEDURE — 80048 BASIC METABOLIC PNL TOTAL CA: CPT | Mod: 91

## 2020-02-16 PROCEDURE — 99233 PR SUBSEQUENT HOSPITAL CARE,LEVL III: ICD-10-PCS | Mod: GC,,, | Performed by: SURGERY

## 2020-02-16 PROCEDURE — 94761 N-INVAS EAR/PLS OXIMETRY MLT: CPT

## 2020-02-16 PROCEDURE — 93750 PR INTERROGATE VENT ASSIST DEV, IN PERSON, W PHYSICIAN ANALYSIS: ICD-10-PCS | Mod: ,,, | Performed by: INTERNAL MEDICINE

## 2020-02-16 PROCEDURE — 85730 THROMBOPLASTIN TIME PARTIAL: CPT

## 2020-02-16 PROCEDURE — 83735 ASSAY OF MAGNESIUM: CPT | Mod: 91

## 2020-02-16 PROCEDURE — 99232 SBSQ HOSP IP/OBS MODERATE 35: CPT | Mod: ,,, | Performed by: INTERNAL MEDICINE

## 2020-02-16 PROCEDURE — 25000003 PHARM REV CODE 250: Performed by: THORACIC SURGERY (CARDIOTHORACIC VASCULAR SURGERY)

## 2020-02-16 PROCEDURE — 94799 UNLISTED PULMONARY SVC/PX: CPT

## 2020-02-16 PROCEDURE — 99233 SBSQ HOSP IP/OBS HIGH 50: CPT | Mod: GC,,, | Performed by: SURGERY

## 2020-02-16 PROCEDURE — 99232 PR SUBSEQUENT HOSPITAL CARE,LEVL II: ICD-10-PCS | Mod: ,,, | Performed by: NURSE PRACTITIONER

## 2020-02-16 PROCEDURE — 93750 INTERROGATION VAD IN PERSON: CPT | Mod: ,,, | Performed by: INTERNAL MEDICINE

## 2020-02-16 PROCEDURE — 85610 PROTHROMBIN TIME: CPT

## 2020-02-16 PROCEDURE — 27000248 HC VAD-ADDITIONAL DAY

## 2020-02-16 PROCEDURE — 94668 MNPJ CHEST WALL SBSQ: CPT

## 2020-02-16 PROCEDURE — 99900035 HC TECH TIME PER 15 MIN (STAT)

## 2020-02-16 PROCEDURE — 85730 THROMBOPLASTIN TIME PARTIAL: CPT | Mod: 91

## 2020-02-16 PROCEDURE — 99232 SBSQ HOSP IP/OBS MODERATE 35: CPT | Mod: ,,, | Performed by: NURSE PRACTITIONER

## 2020-02-16 PROCEDURE — 85025 COMPLETE CBC W/AUTO DIFF WBC: CPT

## 2020-02-16 PROCEDURE — 20000000 HC ICU ROOM

## 2020-02-16 PROCEDURE — 83615 LACTATE (LD) (LDH) ENZYME: CPT

## 2020-02-16 RX ORDER — SODIUM,POTASSIUM PHOSPHATES 280-250MG
2 POWDER IN PACKET (EA) ORAL
Status: DISCONTINUED | OUTPATIENT
Start: 2020-02-16 | End: 2020-02-17

## 2020-02-16 RX ORDER — SODIUM,POTASSIUM PHOSPHATES 280-250MG
1 POWDER IN PACKET (EA) ORAL
Status: DISCONTINUED | OUTPATIENT
Start: 2020-02-16 | End: 2020-02-17

## 2020-02-16 RX ORDER — HEPARIN SODIUM 10000 [USP'U]/100ML
1300 INJECTION, SOLUTION INTRAVENOUS CONTINUOUS
Status: DISCONTINUED | OUTPATIENT
Start: 2020-02-16 | End: 2020-02-16

## 2020-02-16 RX ORDER — INSULIN ASPART 100 [IU]/ML
1-10 INJECTION, SOLUTION INTRAVENOUS; SUBCUTANEOUS
Status: DISCONTINUED | OUTPATIENT
Start: 2020-02-16 | End: 2020-02-19

## 2020-02-16 RX ORDER — IBUPROFEN 200 MG
24 TABLET ORAL
Status: DISCONTINUED | OUTPATIENT
Start: 2020-02-16 | End: 2020-02-19

## 2020-02-16 RX ORDER — IBUPROFEN 200 MG
16 TABLET ORAL
Status: DISCONTINUED | OUTPATIENT
Start: 2020-02-16 | End: 2020-02-19

## 2020-02-16 RX ORDER — HEPARIN SODIUM 10000 [USP'U]/100ML
1500 INJECTION, SOLUTION INTRAVENOUS CONTINUOUS
Status: DISCONTINUED | OUTPATIENT
Start: 2020-02-16 | End: 2020-02-17

## 2020-02-16 RX ORDER — GLUCAGON 1 MG
1 KIT INJECTION
Status: DISCONTINUED | OUTPATIENT
Start: 2020-02-16 | End: 2020-02-19

## 2020-02-16 RX ADMIN — HYDRALAZINE HYDROCHLORIDE 25 MG: 25 TABLET, FILM COATED ORAL at 09:02

## 2020-02-16 RX ADMIN — ACETAMINOPHEN 650 MG: 325 TABLET ORAL at 11:02

## 2020-02-16 RX ADMIN — POTASSIUM CHLORIDE 60 MEQ: 20 SOLUTION ORAL at 05:02

## 2020-02-16 RX ADMIN — HEPARIN SODIUM AND DEXTROSE 1500 UNITS/HR: 10000; 5 INJECTION INTRAVENOUS at 12:02

## 2020-02-16 RX ADMIN — ASPIRIN 325 MG ORAL TABLET 325 MG: 325 PILL ORAL at 08:02

## 2020-02-16 RX ADMIN — FUROSEMIDE 15 MG/HR: 10 INJECTION, SOLUTION INTRAMUSCULAR; INTRAVENOUS at 11:02

## 2020-02-16 RX ADMIN — HYDRALAZINE HYDROCHLORIDE 25 MG: 25 TABLET, FILM COATED ORAL at 05:02

## 2020-02-16 RX ADMIN — Medication 10 ML: at 12:02

## 2020-02-16 RX ADMIN — PANTOPRAZOLE SODIUM 40 MG: 40 TABLET, DELAYED RELEASE ORAL at 08:02

## 2020-02-16 RX ADMIN — DOCUSATE SODIUM 100 MG: 100 CAPSULE, LIQUID FILLED ORAL at 09:02

## 2020-02-16 RX ADMIN — CHLOROTHIAZIDE SODIUM 250 MG: 500 INJECTION, POWDER, LYOPHILIZED, FOR SOLUTION INTRAVENOUS at 09:02

## 2020-02-16 RX ADMIN — MAGNESIUM SULFATE HEPTAHYDRATE 2 G: 40 INJECTION, SOLUTION INTRAVENOUS at 05:02

## 2020-02-16 RX ADMIN — Medication 800 MG: at 09:02

## 2020-02-16 RX ADMIN — AMLODIPINE BESYLATE 10 MG: 10 TABLET ORAL at 08:02

## 2020-02-16 RX ADMIN — POTASSIUM & SODIUM PHOSPHATES POWDER PACK 280-160-250 MG 2 PACKET: 280-160-250 PACK at 06:02

## 2020-02-16 RX ADMIN — Medication 10 ML: at 05:02

## 2020-02-16 RX ADMIN — FUROSEMIDE 15 MG/HR: 10 INJECTION, SOLUTION INTRAMUSCULAR; INTRAVENOUS at 10:02

## 2020-02-16 RX ADMIN — POTASSIUM CHLORIDE 20 MEQ: 20 SOLUTION ORAL at 08:02

## 2020-02-16 RX ADMIN — Medication 800 MG: at 08:02

## 2020-02-16 RX ADMIN — ACETAMINOPHEN 650 MG: 325 TABLET ORAL at 05:02

## 2020-02-16 RX ADMIN — POLYETHYLENE GLYCOL 3350 17 G: 17 POWDER, FOR SOLUTION ORAL at 08:02

## 2020-02-16 RX ADMIN — POTASSIUM CHLORIDE 40 MEQ: 20 SOLUTION ORAL at 05:02

## 2020-02-16 RX ADMIN — WARFARIN SODIUM 7.5 MG: 2.5 TABLET ORAL at 04:02

## 2020-02-16 RX ADMIN — DOCUSATE SODIUM 100 MG: 100 CAPSULE, LIQUID FILLED ORAL at 08:02

## 2020-02-16 RX ADMIN — POTASSIUM CHLORIDE 20 MEQ: 20 SOLUTION ORAL at 09:02

## 2020-02-16 RX ADMIN — DOBUTAMINE IN DEXTROSE 5 MCG/KG/MIN: 200 INJECTION, SOLUTION INTRAVENOUS at 11:02

## 2020-02-16 RX ADMIN — ACETAMINOPHEN 650 MG: 325 TABLET ORAL at 12:02

## 2020-02-16 RX ADMIN — DOBUTAMINE IN DEXTROSE 5 MCG/KG/MIN: 200 INJECTION, SOLUTION INTRAVENOUS at 08:02

## 2020-02-16 RX ADMIN — Medication 10 ML: at 06:02

## 2020-02-16 RX ADMIN — FERROUS GLUCONATE TAB 324 MG (37.5 MG ELEMENTAL IRON) 324 MG: 324 (37.5 FE) TAB at 08:02

## 2020-02-16 RX ADMIN — HYDRALAZINE HYDROCHLORIDE 25 MG: 25 TABLET, FILM COATED ORAL at 04:02

## 2020-02-16 RX ADMIN — POTASSIUM & SODIUM PHOSPHATES POWDER PACK 280-160-250 MG 1 PACKET: 280-160-250 PACK at 12:02

## 2020-02-16 RX ADMIN — POTASSIUM & SODIUM PHOSPHATES POWDER PACK 280-160-250 MG 1 PACKET: 280-160-250 PACK at 05:02

## 2020-02-16 RX ADMIN — POTASSIUM & SODIUM PHOSPHATES POWDER PACK 280-160-250 MG 1 PACKET: 280-160-250 PACK at 09:02

## 2020-02-16 RX ADMIN — Medication 800 MG: at 04:02

## 2020-02-16 NOTE — PROGRESS NOTES
"[facility]  Endocrinology  Progress Note    Admit Date: 1/15/2020     Reason for Consult: Management of  Hyperglycemia     Surgical Procedure and Date:    ICD implantation 02/15/2019      HPI:   Patient is a 54 y.o. 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 Chronic MARC - Class II-III and mild LE edema.  Admitted to [facility] on Thursday due to severe SOB for a week with associated orthopnea, MARC, and PND unable to lie flat and found to be in acute diastolic heart failure.He was started on CRRT for a few days and tolerated well. Renal u/s was negative for obstruction and liver u/s without findings of cirrhosis. All of this was progression of cardiorenal syndrome with liver congestion from severe volume overload. No dx of DM noted on file. Patient denies history of DM at time of consult. Endocrinology consulted to manage BG during admission to [facility].          Lab Results   Component Value Date    HGBA1C 6.6 (H) 2020       Interval HPI:   Overnight events: Fasting BG slightly elevated. Tolerating diet well. Denies eating overnight.   Eatin%  Nausea: No  Hypoglycemia and intervention: No  Fever: No  TPN and/or TF: No  If yes, type of TF/TPN and rate: none    BP (!) 88/0 (BP Location: Left arm, Patient Position: Lying)   Pulse (!) 111   Temp 98 °F (36.7 °C) (Oral)   Resp 20   Ht 5' 7" (1.702 m)   Wt 76 kg (167 lb 8.8 oz)   SpO2 100%   BMI 26.24 kg/m²      Labs Reviewed and Include    Recent Labs   Lab 20  0300 20  1040 20  1200   *  156* 154*  --    CALCIUM 9.0  9.0 9.2  --    ALBUMIN 2.4*  --   --    PROT 7.7  --   --      141 139  --    K 3.9  3.9  3.9 4.1 4.0   CO2 32*  32* 34*  --    CL 97  97 96  --    BUN 37*  37* 33*  --    CREATININE 1.2  1.2 1.2  --  "   ALKPHOS 117  --   --    ALT 23  --   --    AST 25  --   --    BILITOT 0.7  --   --      Lab Results   Component Value Date    WBC 19.00 (H) 02/16/2020    HGB 7.3 (L) 02/16/2020    HCT 23.8 (L) 02/16/2020    MCV 83 02/16/2020     02/16/2020     No results for input(s): TSH, FREET4 in the last 168 hours.  Lab Results   Component Value Date    HGBA1C 6.6 (H) 01/16/2020       Nutritional status:   Body mass index is 26.24 kg/m².  Lab Results   Component Value Date    ALBUMIN 2.4 (L) 02/16/2020    ALBUMIN 2.6 (L) 02/15/2020    ALBUMIN 2.7 (L) 02/14/2020    ALBUMIN 2.7 (L) 02/14/2020     Lab Results   Component Value Date    PREALBUMIN 13 (L) 02/15/2020    PREALBUMIN 14 (L) 02/07/2020    PREALBUMIN 23 01/28/2020       Estimated Creatinine Clearance: 65 mL/min (based on SCr of 1.2 mg/dL).    Accu-Checks  Recent Labs     02/13/20  1608 02/13/20  2211   POCTGLUCOSE 134* 129*       Current Medications and/or Treatments Impacting Glycemic Control  Immunotherapy:    Immunosuppressants     None        Steroids:   Hormones (From admission, onward)    None        Pressors:    Autonomic Drugs (From admission, onward)    None        Hyperglycemia/Diabetes Medications:   Antihyperglycemics (From admission, onward)    None          ASSESSMENT and PLAN    Acute hyperglycemia  BG goal 140-180  Fasting BG 150s. Will monitor BG ac/hs.    Start low dose correction scale and BG monitoring ac/hs    ** Please call Endocrine for any BG related issues **    Discharge plans: TBD      Cardiogenic shock  Managed per primary team  Avoid hypoglycemia        ANJELICA (acute kidney injury)  Titrate insulin slowly to avoid hypoglycemia as the risk of hypoglycemia increases with decreased creatinine clearance.          Morbid obesity  Body mass index is 26.24 kg/m².  may contribute to insulin resistance            Massiel Paz NP  Endocrinology  [facility]

## 2020-02-16 NOTE — PLAN OF CARE
Patient has been stable throughout shift.   UOP has been 200-300 mL/hr.   Heparin, LASIX, Dobutamine infusions completed.   SPO2 has been >92 on room air.   Transfer pending to CTSU.  Will continue to monitor patients status.

## 2020-02-16 NOTE — ASSESSMENT & PLAN NOTE
BG goal 140-180  Fasting BG 150s. Will monitor BG ac/hs.    Start low dose correction scale and BG monitoring ac/hs    ** Please call Endocrine for any BG related issues **    Discharge plans: TBD

## 2020-02-16 NOTE — ASSESSMENT & PLAN NOTE
-HeartMate 3 Implanted 02/06/2020 as DT. Chest closed 2/7.   -CTS Primary.  -Implanted by Dr. Joshi.  -INR sub therapeutic Coumadin, Goal INR 2.0-3.0. Anticoag per CTS.   -Antiplatelets  mg.  -LDH is stable overall today. Will continue to monitor daily.  -Continues on  and lasix drips  -Speed set at 5200, LSL 4800 rpm.  -Not listed for OHTx.  Procedure: Device Interrogation Including analysis of device parameters.  Current Settings: Ventricular Assist Device.  Review of device function is stable/unstable stable.    TXP LVAD INTERROGATIONS 2/16/2020 2/16/2020 2/16/2020 2/16/2020 2/16/2020 2/16/2020 2/16/2020   Type HeartMate3 HeartMate3 HeartMate3 HeartMate3 HeartMate3 HeartMate3 HeartMate3   Flow 4.4 4.5 4.6 4.6 4.5 4.5 4.5   Speed 5300 5300 5300 5300 5300 5300 5300   PI 3.8 3.9 3.1 3.2 3.8 3.6 3.3   Power (Centeno) 4.0 4.0 4.1 4.0 4.0 4.0 3.9    4900 4900 4900 4900 4900 4900   Pulsatility Intermittent pulse Intermittent pulse Intermittent pulse Intermittent pulse Intermittent pulse Intermittent pulse -

## 2020-02-16 NOTE — PROGRESS NOTES
[facility]  Cardiothoracic Surgery  Progress Note    Patient Name: Saba Lott  MRN: [id number]  Admission Date: 1/15/2020  Hospital Length of Stay: 32 days  Code Status: Prior   Attending Physician: David Joshi MD   Referring Provider: Jacobo Lima MD  Principal Problem:Presence of left ventricular assist device (LVAD)      Subjective:     Post-Op Info:  Procedure(s) (LRB):  CLOSURE, WOUND, STERNUM (N/A)  WASHOUT  INSERTION, GRAFT, PERICARDIUM  APPLICATION, WOUND VAC   9 Days Post-Op     Interval History/Significant Events: NAEON. Epi weaned to off, Radha weaned to off, lasix gtt 15 and heparin therapeutic at 1200. UOP 3.595L over 24 hours. Prevena discontinued.      Follow-up For: Procedure(s) (LRB):  CLOSURE, WOUND, STERNUM (N/A)  WASHOUT  INSERTION, GRAFT, PERICARDIUM  APPLICATION, WOUND VAC    Objective:     Vital Signs (Most Recent):  Temp: 98.2 °F (36.8 °C) (02/16/20 0300)  Pulse: (!) 112 (02/16/20 0600)  Resp: (!) 24 (02/16/20 0600)  BP: (!) 84/0 (02/16/20 0300)  SpO2: 98 % (02/16/20 0300) Vital Signs (24h Range):  Temp:  [97.7 °F (36.5 °C)-98.2 °F (36.8 °C)] 98.2 °F (36.8 °C)  Pulse:  [102-119] 112  Resp:  [15-38] 24  SpO2:  [97 %-99 %] 98 %  BP: ()/(0-76) 84/0  Arterial Line BP: ()/() 92/83     Weight: 76 kg (167 lb 8.8 oz)  Body mass index is 26.24 kg/m².      Intake/Output Summary (Last 24 hours) at 2/16/2020 0627  Last data filed at 2/16/2020 0600  Gross per 24 hour   Intake 2133.2 ml   Output 3795 ml   Net -1661.8 ml       Physical Exam   Constitutional: He is oriented to person, place, and time. He appears well-developed and well-nourished.   HENT:   Head: Normocephalic and atraumatic.   Eyes: Pupils are equal, round, and reactive to light. EOM are normal.   Neck: Normal range of motion. Neck supple.   Cardiovascular: Normal rate and regular rhythm.   LVAD in place  Introducer in place   Pulmonary/Chest: Effort normal. No respiratory distress.   Satting well on  RA   Abdominal: Soft.   Neurological: He is alert and oriented to person, place, and time.   Skin: Skin is warm and dry.   Psychiatric: He has a normal mood and affect. His behavior is normal.   Nursing note and vitals reviewed.      Vents:  n/a    Lines/Drains/Airways     Peripherally Inserted Central Catheter Line                 PICC Triple Lumen 02/08/20 1530 right basilic 7 days          Central Venous Catheter Line                 Percutaneous Central Line Insertion/Assessment - Quad lumen  02/06/20 0742 9 days          Drain                 Urethral Catheter 02/12/20 1620 3 days          Arterial Line                 Arterial Line 02/06/20 0736 Left Other (Comment) 9 days          Line                 VAD 02/06/20 1047 Left ventricular assist device HeartMate 3 9 days                Significant Labs:    CBC/Anemia Profile:  Recent Labs   Lab 02/15/20  0400 02/16/20  0300   WBC 18.67* 19.00*   HGB 7.5* 7.3*   HCT 24.6* 23.8*    348   MCV 83 83   RDW 18.9* 19.2*        Chemistries:  Recent Labs   Lab 02/15/20  0400 02/15/20  1000  02/15/20  1746 02/15/20  2200 02/16/20  0300   *  135* 138  138  --   --  138 141  141   K 4.4  4.4  4.4  4.4 3.8  3.8   < > 4.2 4.2 3.9  3.9  3.9   CL 93*  93* 98  98  --   --  96 97  97   CO2 34*  34* 30*  30*  --   --  33* 32*  32*   BUN 38*  38* 35*  35*  --   --  38* 37*  37*   CREATININE 1.2  1.2 1.1  1.1  --   --  1.3 1.2  1.2   CALCIUM 9.1  9.1 8.2*  8.2*  --   --  9.1 9.0  9.0   ALBUMIN 2.6*  --   --   --   --  2.4*   PROT 7.8  --   --   --   --  7.7   BILITOT 0.7  --   --   --   --  0.7   ALKPHOS 125  --   --   --   --  117   ALT 29  --   --   --   --  23   AST 29  --   --   --   --  25   MG 2.1 2.1  2.1  --   --  2.1 2.1   PHOS 2.8  --   --   --   --  1.9*    < > = values in this interval not displayed.       ABGs:   Recent Labs   Lab 02/15/20  0542   PH 7.425   PCO2 54.4*   HCO3 35.7*   POCSATURATED 64*   BE 11     Coagulation:    Recent Labs   Lab 02/16/20  0300   INR 1.2   APTT 40.7*  40.7*  40.7*     All pertinent labs within the past 24 hours have been reviewed.    Significant Imaging:  I have reviewed and interpreted all pertinent imaging results/findings within the past 24 hours.     Assessment/Plan:     * Presence of left ventricular assist device (LVAD)  Non-ischemic cardiomyopathy  Neuro:   - Pain control: scheduled tylenol. Pain controlled without opioids  - No sedation.      Pulmonary:   - Extubated; satting well on RA  - Radha - weaned off  - CPT q6hrs  - Daily CXR pending     Cardiac:  - MAP goal >65  - Epi weaned off  -  at 5  - Cardene OFF  - amlodipine 10 qd  - Increased LVAD speed 5300 and flows to 4.9 per CTS  - LDH trending down  - PICC in place  - Lasix gtt at 15  - scheduled diuril 250 daily     Renal:     Intake/Output Summary (Last 24 hours) at 2/16/2020 0628  Last data filed at 2/16/2020 0600  Gross per 24 hour   Intake 2133.2 ml   Output 3795 ml   Net -1661.8 ml     - monitor UOP  - lasix gtt at  15  - BUN/ Cr stable at 37/1.2     Fluids/Electrolytes/Nutrition/GI:   -Nutritional status: mechanical soft diet  - bar Mg, K  - SLP c/s  - decreased 250 free water flushes to q8hrs  - bowel regimen: Miralax and docusate      Hematology/Oncology:  -H/H stable  7.3/23.8  -INR/Plts 1.2/156  -Trend CBC  -coumadin 7.5 at 5 pm   -hep @ 1200u/hr with PTT goal 45 - 54  -  mg qd     Infectious Disease:   -Afebrile  -Leukocytosis WBC 19, stable  -Abx: completed course of vanc and ancef     Endocrine:  -Glucose goal of 140-180  -Insulin gtt per endo     Dispo:  -Stepdown to CTSU today.        Pat Venegas MD  Cardiothoracic Surgery  [facility]

## 2020-02-16 NOTE — SUBJECTIVE & OBJECTIVE
Interval History: No complaints this am. Feeling well today.    Continuous Infusions:   DOBUTamine 5 mcg/kg/min (02/16/20 1000)    furosemide (LASIX) 2 mg/mL continuous infusion (non-titrating) 15 mg/hr (02/16/20 1055)    heparin (porcine) in 5 % dex 1,500 Units/hr (02/16/20 1224)    nicardipine 2.5 mg/hr (02/11/20 0600)     Scheduled Meds:   acetaminophen  650 mg Oral Q6H    amLODIPine  10 mg Oral Daily    aspirin  325 mg Per NG tube Daily    chlorothiazide (DIURIL) IVPB  250 mg Intravenous Q24H    chlorothiazide (DIURIL) IVPB  500 mg Intravenous Once    docusate sodium  100 mg Oral BID    ferrous gluconate  324 mg Oral Daily with breakfast    hydrALAZINE  25 mg Oral Q8H    magnesium oxide  800 mg Oral TID    pantoprazole  40 mg Oral Daily    polyethylene glycol  17 g Oral Daily    potassium chloride 10%  20 mEq Per NG tube BID    potassium, sodium phosphates  1 packet Oral QID (WM & HS)    sodium chloride 0.9%  10 mL Intravenous Q6H    warfarin  7.5 mg Oral Once     PRN Meds:albumin human 5%, albuterol sulfate, bisacodyL, Dextrose 10% Bolus, Dextrose 10% Bolus, Dextrose 10% Bolus, Dextrose 10% Bolus, diphenhydrAMINE, hydrALAZINE, magnesium hydroxide 400 mg/5 ml, magnesium sulfate IVPB, oxyCODONE, oxyCODONE, potassium chloride 10%, potassium chloride 10%, potassium chloride 10%, potassium chloride 10%, potassium, sodium phosphates, potassium, sodium phosphates, potassium, sodium phosphates, sodium chloride 0.9%, Flushing PICC Protocol **AND** sodium chloride 0.9% **AND** sodium chloride 0.9%    Review of patient's allergies indicates:   Allergen Reactions    Iodine and iodide containing products      Objective:     Vital Signs (Most Recent):  Temp: 98 °F (36.7 °C) (02/16/20 1100)  Pulse: (!) 117 (02/16/20 1200)  Resp: 18 (02/16/20 1200)  BP: (!) 88/0 (02/16/20 1100)  SpO2: 100 % (02/16/20 1100) Vital Signs (24h Range):  Temp:  [97.4 °F (36.3 °C)-98.2 °F (36.8 °C)] 98 °F (36.7 °C)  Pulse:   [105-119] 117  Resp:  [15-38] 18  SpO2:  [96 %-100 %] 100 %  BP: ()/(0-76) 88/0  Arterial Line BP: ()/(59-93) 100/76     Patient Vitals for the past 72 hrs (Last 3 readings):   Weight   02/15/20 0300 76 kg (167 lb 8.8 oz)   02/14/20 0300 75 kg (165 lb 5.5 oz)     Body mass index is 26.24 kg/m².      Intake/Output Summary (Last 24 hours) at 2/16/2020 1301  Last data filed at 2/16/2020 1000  Gross per 24 hour   Intake 1100.3 ml   Output 3015 ml   Net -1914.7 ml       Physical Exam   Constitutional: He is oriented to person, place, and time. He appears well-developed and well-nourished.   HENT:   Head: Normocephalic and atraumatic.   Cardiovascular: Normal rate and regular rhythm.   LVAD in place   Pulmonary/Chest: Effort normal. No respiratory distress.   Abdominal: Soft.   Genitourinary:   Genitourinary Comments: Mc cath in place   Neurological: He is alert and oriented to person, place, and time.   Skin: Skin is warm and dry.   Psychiatric: He has a normal mood and affect. His behavior is normal.   Nursing note and vitals reviewed.    Significant Labs:  CBC:  Recent Labs   Lab 02/14/20  0400 02/15/20  0400 02/16/20  0300   WBC 18.17* 18.67* 19.00*   RBC 3.08* 2.97* 2.87*   HGB 7.9* 7.5* 7.3*   HCT 25.7* 24.6* 23.8*    313 348   MCV 83 83 83   MCH 25.6* 25.3* 25.4*   MCHC 30.7* 30.5* 30.7*     BNP:  Recent Labs   Lab 02/10/20  0401 02/12/20  0400 02/14/20  0400   * 1,356* 808*     CMP:  Recent Labs   Lab 02/14/20  0400  02/15/20  0400  02/15/20  2200 02/16/20  0300 02/16/20  1040   *  119*   < > 162*  162*   < > 152* 156*  156* 154*   CALCIUM 9.6  9.6   < > 9.1  9.1   < > 9.1 9.0  9.0 9.2   ALBUMIN 2.7*  2.7*  --  2.6*  --   --  2.4*  --    PROT 7.9  7.9  --  7.8  --   --  7.7  --      140   < > 135*  135*   < > 138 141  141 139   K 3.7  3.7  3.7   < > 4.4  4.4  4.4  4.4   < > 4.2 3.9  3.9  3.9 4.1   CO2 33*  33*   < > 34*  34*   < > 33* 32*  32* 34*   CL  97  97   < > 93*  93*   < > 96 97  97 96   BUN 42*  42*   < > 38*  38*   < > 38* 37*  37* 33*   CREATININE 1.3  1.3   < > 1.2  1.2   < > 1.3 1.2  1.2 1.2   ALKPHOS 121  121  --  125  --   --  117  --    ALT 36  36  --  29  --   --  23  --    AST 37  37  --  29  --   --  25  --    BILITOT 1.0  1.0  --  0.7  --   --  0.7  --     < > = values in this interval not displayed.      Coagulation:   Recent Labs   Lab 02/14/20  0400  02/15/20  0400  02/15/20  2200 02/16/20  0300 02/16/20  1040   INR 1.3*  --  1.2  --   --  1.2  --    APTT 49.0*  49.0*  49.0*   < > 52.9*  52.9*  52.9*   < > 46.3* 40.7*  40.7*  40.7* 63.9*    < > = values in this interval not displayed.     LDH:  Recent Labs   Lab 02/14/20  0400 02/15/20  0400 02/16/20  0300   * 362* 381*     Microbiology:  Microbiology Results (last 7 days)     ** No results found for the last 168 hours. **        I have reviewed all pertinent labs within the past 24 hours.    Estimated Creatinine Clearance: 65 mL/min (based on SCr of 1.2 mg/dL).    Diagnostic Results:  I have reviewed and interpreted all pertinent imaging results/findings within the past 24 hours.

## 2020-02-16 NOTE — PROGRESS NOTES
02/16/2020  Abelardo Sepulveda    Current provider:  David Joshi MD      I, Abelardo Sepulveda, rounded on Saba Lott to ensure all mechanical assist device settings (IABP or VAD) were appropriate and all parameters were within limits.  I was able to ensure all back up equipment was present, the staff had no issues, and the Perfusion Department daily rounding was complete.    5:32 PM

## 2020-02-16 NOTE — SUBJECTIVE & OBJECTIVE
Interval History/Significant Events: NAEON. Epi weaned to off, Radha weaned to off, lasix gtt 15 and heparin therapeutic at 1200. UOP 3.595L over 24 hours. Prevena discontinued.      Follow-up For: Procedure(s) (LRB):  CLOSURE, WOUND, STERNUM (N/A)  WASHOUT  INSERTION, GRAFT, PERICARDIUM  APPLICATION, WOUND VAC    Objective:     Vital Signs (Most Recent):  Temp: 98.2 °F (36.8 °C) (02/16/20 0300)  Pulse: (!) 112 (02/16/20 0600)  Resp: (!) 24 (02/16/20 0600)  BP: (!) 84/0 (02/16/20 0300)  SpO2: 98 % (02/16/20 0300) Vital Signs (24h Range):  Temp:  [97.7 °F (36.5 °C)-98.2 °F (36.8 °C)] 98.2 °F (36.8 °C)  Pulse:  [102-119] 112  Resp:  [15-38] 24  SpO2:  [97 %-99 %] 98 %  BP: ()/(0-76) 84/0  Arterial Line BP: ()/() 92/83     Weight: 76 kg (167 lb 8.8 oz)  Body mass index is 26.24 kg/m².      Intake/Output Summary (Last 24 hours) at 2/16/2020 0627  Last data filed at 2/16/2020 0600  Gross per 24 hour   Intake 2133.2 ml   Output 3795 ml   Net -1661.8 ml       Physical Exam   Constitutional: He is oriented to person, place, and time. He appears well-developed and well-nourished.   HENT:   Head: Normocephalic and atraumatic.   Eyes: Pupils are equal, round, and reactive to light. EOM are normal.   Neck: Normal range of motion. Neck supple.   Cardiovascular: Normal rate and regular rhythm.   LVAD in place  Introducer in place   Pulmonary/Chest: Effort normal. No respiratory distress.   Satting well on RA   Abdominal: Soft.   Neurological: He is alert and oriented to person, place, and time.   Skin: Skin is warm and dry.   Psychiatric: He has a normal mood and affect. His behavior is normal.   Nursing note and vitals reviewed.      Vents:  n/a    Lines/Drains/Airways     Peripherally Inserted Central Catheter Line                 PICC Triple Lumen 02/08/20 1530 right basilic 7 days          Central Venous Catheter Line                 Percutaneous Central Line Insertion/Assessment - Quad lumen  02/06/20 0742 9  days          Drain                 Urethral Catheter 02/12/20 1620 3 days          Arterial Line                 Arterial Line 02/06/20 0736 Left Other (Comment) 9 days          Line                 VAD 02/06/20 1047 Left ventricular assist device HeartMate 3 9 days                Significant Labs:    CBC/Anemia Profile:  Recent Labs   Lab 02/15/20  0400 02/16/20  0300   WBC 18.67* 19.00*   HGB 7.5* 7.3*   HCT 24.6* 23.8*    348   MCV 83 83   RDW 18.9* 19.2*        Chemistries:  Recent Labs   Lab 02/15/20  0400 02/15/20  1000  02/15/20  1746 02/15/20  2200 02/16/20  0300   *  135* 138  138  --   --  138 141  141   K 4.4  4.4  4.4  4.4 3.8  3.8   < > 4.2 4.2 3.9  3.9  3.9   CL 93*  93* 98  98  --   --  96 97  97   CO2 34*  34* 30*  30*  --   --  33* 32*  32*   BUN 38*  38* 35*  35*  --   --  38* 37*  37*   CREATININE 1.2  1.2 1.1  1.1  --   --  1.3 1.2  1.2   CALCIUM 9.1  9.1 8.2*  8.2*  --   --  9.1 9.0  9.0   ALBUMIN 2.6*  --   --   --   --  2.4*   PROT 7.8  --   --   --   --  7.7   BILITOT 0.7  --   --   --   --  0.7   ALKPHOS 125  --   --   --   --  117   ALT 29  --   --   --   --  23   AST 29  --   --   --   --  25   MG 2.1 2.1  2.1  --   --  2.1 2.1   PHOS 2.8  --   --   --   --  1.9*    < > = values in this interval not displayed.       ABGs:   Recent Labs   Lab 02/15/20  0542   PH 7.425   PCO2 54.4*   HCO3 35.7*   POCSATURATED 64*   BE 11     Coagulation:   Recent Labs   Lab 02/16/20  0300   INR 1.2   APTT 40.7*  40.7*  40.7*     All pertinent labs within the past 24 hours have been reviewed.    Significant Imaging:  I have reviewed and interpreted all pertinent imaging results/findings within the past 24 hours.

## 2020-02-16 NOTE — PLAN OF CARE
Plan of Care Note  Cardiothoracic Surgery    Saba Lott is a 54 y.o. male with heart failure who underwent LVAD placement (2/6/20) and chest closure (2/7/20).    Specific issues: increase hep gtt to 1500 for goal 45-54, given another coumadin 7.5 today (INR 1.2 this AM, got coumadin 2, 2, 4, 4, 6, 6, 7.5), provena dressing has been removed, phos is repleted, stepdown if bed available to CTSU.    Plan of care for patient was discussed with ICU staff including nurses, residents, and faculty and appropriate consulting services.    Will continue to monitor patient's hemodynamics, functionality, neuro status, fluid status and renal function, and labs and will adjust medications and fluids as necessary while monitoring appropriateness for de-escalation of support and monitoring and transport to stepdown unit.    Roselyn Salazar MD  Cardiac Surgery Resident, PGY7  Cardiothoracic Surgery  [facility]

## 2020-02-16 NOTE — PROGRESS NOTES
[facility]  Heart Transplant  Progress Note    Patient Name: Saba Lott  MRN: [id number]  Admission Date: 1/15/2020  Hospital Length of Stay: 32 days  Attending Physician: David Joshi MD  Primary Care Provider: Primary Doctor No  Principal Problem:Presence of left ventricular assist device (LVAD)    Subjective:     Interval History: No complaints this am. Considers he is getting better, and feels well.    Continuous Infusions:   DOBUTamine 5 mcg/kg/min (02/16/20 1000)    furosemide (LASIX) 2 mg/mL continuous infusion (non-titrating) 15 mg/hr (02/16/20 1055)    heparin (porcine) in 5 % dex 1,500 Units/hr (02/16/20 1224)    nicardipine 2.5 mg/hr (02/11/20 0600)     Scheduled Meds:   acetaminophen  650 mg Oral Q6H    amLODIPine  10 mg Oral Daily    aspirin  325 mg Per NG tube Daily    chlorothiazide (DIURIL) IVPB  250 mg Intravenous Q24H    chlorothiazide (DIURIL) IVPB  500 mg Intravenous Once    docusate sodium  100 mg Oral BID    ferrous gluconate  324 mg Oral Daily with breakfast    hydrALAZINE  25 mg Oral Q8H    magnesium oxide  800 mg Oral TID    pantoprazole  40 mg Oral Daily    polyethylene glycol  17 g Oral Daily    potassium chloride 10%  20 mEq Per NG tube BID    potassium, sodium phosphates  1 packet Oral QID (WM & HS)    sodium chloride 0.9%  10 mL Intravenous Q6H    warfarin  7.5 mg Oral Once     PRN Meds:albumin human 5%, albuterol sulfate, bisacodyL, Dextrose 10% Bolus, Dextrose 10% Bolus, Dextrose 10% Bolus, Dextrose 10% Bolus, diphenhydrAMINE, hydrALAZINE, magnesium hydroxide 400 mg/5 ml, magnesium sulfate IVPB, oxyCODONE, oxyCODONE, potassium chloride 10%, potassium chloride 10%, potassium chloride 10%, potassium chloride 10%, potassium, sodium phosphates, potassium, sodium phosphates, potassium, sodium phosphates, sodium chloride 0.9%, Flushing PICC Protocol **AND** sodium chloride 0.9% **AND** sodium chloride 0.9%    Review of patient's allergies indicates:    Allergen Reactions    Iodine and iodide containing products      Objective:     Vital Signs (Most Recent):  Temp: 98 °F (36.7 °C) (02/16/20 1100)  Pulse: (!) 117 (02/16/20 1200)  Resp: 18 (02/16/20 1200)  BP: (!) 88/0 (02/16/20 1100)  SpO2: 100 % (02/16/20 1100) Vital Signs (24h Range):  Temp:  [97.4 °F (36.3 °C)-98.2 °F (36.8 °C)] 98 °F (36.7 °C)  Pulse:  [105-119] 117  Resp:  [15-38] 18  SpO2:  [96 %-100 %] 100 %  BP: ()/(0-76) 88/0  Arterial Line BP: ()/(59-93) 100/76     Patient Vitals for the past 72 hrs (Last 3 readings):   Weight   02/15/20 0300 76 kg (167 lb 8.8 oz)   02/14/20 0300 75 kg (165 lb 5.5 oz)     Body mass index is 26.24 kg/m².      Intake/Output Summary (Last 24 hours) at 2/16/2020 1301  Last data filed at 2/16/2020 1000  Gross per 24 hour   Intake 1100.3 ml   Output 3015 ml   Net -1914.7 ml       Physical Exam   Constitutional: He is oriented to person, place, and time. He appears well-developed and well-nourished.   HENT:   Head: Normocephalic and atraumatic.   Cardiovascular: Normal rate and regular rhythm.   LVAD in place   Pulmonary/Chest: Effort normal. No respiratory distress.   Abdominal: Soft.   Genitourinary:   Genitourinary Comments: Mc cath in place   Neurological: He is alert and oriented to person, place, and time.   Skin: Skin is warm and dry.   Psychiatric: He has a normal mood and affect. His behavior is normal.   Nursing note and vitals reviewed.    Significant Labs:  CBC:  Recent Labs   Lab 02/14/20  0400 02/15/20  0400 02/16/20  0300   WBC 18.17* 18.67* 19.00*   RBC 3.08* 2.97* 2.87*   HGB 7.9* 7.5* 7.3*   HCT 25.7* 24.6* 23.8*    313 348   MCV 83 83 83   MCH 25.6* 25.3* 25.4*   MCHC 30.7* 30.5* 30.7*     BNP:  Recent Labs   Lab 02/10/20  0401 02/12/20  0400 02/14/20  0400   * 1,356* 808*     CMP:  Recent Labs   Lab 02/14/20  0400  02/15/20  0400  02/15/20  2200 02/16/20  0300 02/16/20  1040   *  119*   < > 162*  162*   < > 152* 156*   156* 154*   CALCIUM 9.6  9.6   < > 9.1  9.1   < > 9.1 9.0  9.0 9.2   ALBUMIN 2.7*  2.7*  --  2.6*  --   --  2.4*  --    PROT 7.9  7.9  --  7.8  --   --  7.7  --      140   < > 135*  135*   < > 138 141  141 139   K 3.7  3.7  3.7   < > 4.4  4.4  4.4  4.4   < > 4.2 3.9  3.9  3.9 4.1   CO2 33*  33*   < > 34*  34*   < > 33* 32*  32* 34*   CL 97  97   < > 93*  93*   < > 96 97  97 96   BUN 42*  42*   < > 38*  38*   < > 38* 37*  37* 33*   CREATININE 1.3  1.3   < > 1.2  1.2   < > 1.3 1.2  1.2 1.2   ALKPHOS 121  121  --  125  --   --  117  --    ALT 36  36  --  29  --   --  23  --    AST 37  37  --  29  --   --  25  --    BILITOT 1.0  1.0  --  0.7  --   --  0.7  --     < > = values in this interval not displayed.      Coagulation:   Recent Labs   Lab 02/14/20  0400  02/15/20  0400  02/15/20  2200 02/16/20  0300 02/16/20  1040   INR 1.3*  --  1.2  --   --  1.2  --    APTT 49.0*  49.0*  49.0*   < > 52.9*  52.9*  52.9*   < > 46.3* 40.7*  40.7*  40.7* 63.9*    < > = values in this interval not displayed.     LDH:  Recent Labs   Lab 02/14/20  0400 02/15/20  0400 02/16/20  0300   * 362* 381*     Microbiology:  Microbiology Results (last 7 days)     ** No results found for the last 168 hours. **        I have reviewed all pertinent labs within the past 24 hours.    Estimated Creatinine Clearance: 65 mL/min (based on SCr of 1.2 mg/dL).    Diagnostic Results:  I have reviewed and interpreted all pertinent imaging results/findings within the past 24 hours.    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 Chronic MARC - Class II-III and mild LE edema.      Hospital Course (synopsis of major diagnoses, care, treatment, and services provided during the course of the hospital stay):  -2/12 admit to CDU  for diuresis with IV lasix  -IV abx   -CXR with suspected small left pleural effusion with associated left basilar atelectasis versus evolving airspace disease  -2/13 single chamber ICD implant; IV lasix decreased to BID  -2/16 add dobutamine, hold BB due to hypotension, no ACE-I or ARB due to recent HPI hypotension  -2/17 Lasix changed to PO  -2/18 dobutamine discontinued    Admitted to [facility] on Thursday due to severe SOB for a week with associated orthopnea, MARC, and PND unable to lie flat and found to be in acute diastolic heart failure. States he normally weighs around 219 but up to 254lb on admission tonight. Says he hasn't been the most compliant in terms of fluid restriction and daily weights but has avoided salt. BNP on admission was 2375. BUN/CRT 32/1.4 He was started on IV diuretics but continued to deteriorate. Creatinine continued to increase and reached 4.3 with oliguria. He was started on CRRT for a few days and tolerated well. Renal u/s was negative for obstruction and liver u/s without findings of cirrhosis. All of this was progression of cardiorenal syndrome with liver congestion from severe volume overload. On arrival vitals stable and in no acute distress. He has obvious anasarca up to the chest. He did have uop of 500 at time of arrival also.    TTE 5/28/19  · Moderate left ventricular enlargement.  · Severely decreased left ventricular systolic function. The estimated ejection fraction is 20%  · Global hypokinetic wall motion.  · Grade III (severe) left ventricular diastolic dysfunction consistent with restrictive physiology.  · Severe left atrial enlargement.  · Moderate right ventricular enlargement.  · Low normal right ventricular systolic function.  · Mild right atrial enlargement.  · Moderate mitral regurgitation.  Mild to moderate tricuspid regurgitation    * Presence of left ventricular assist device (LVAD)  -HeartMate 3 Implanted 02/06/2020 as DT. Chest closed 2/7.   -CTS  Primary.  -Implanted by Dr. Joshi.  -INR sub therapeutic Coumadin, Goal INR 2.0-3.0. Anticoag per CTS.   -Antiplatelets  mg.  -LDH is stable overall today. Will continue to monitor daily.  -Continues on  and lasix drips  -Speed set at 5200, LSL 4800 rpm.  -Not listed for OHTx.  Procedure: Device Interrogation Including analysis of device parameters.  Current Settings: Ventricular Assist Device.  Review of device function is stable/unstable stable.    TXP LVAD INTERROGATIONS 2/16/2020 2/16/2020 2/16/2020 2/16/2020 2/16/2020 2/16/2020 2/16/2020   Type HeartMate3 HeartMate3 HeartMate3 HeartMate3 HeartMate3 HeartMate3 HeartMate3   Flow 4.4 4.5 4.6 4.6 4.5 4.5 4.5   Speed 5300 5300 5300 5300 5300 5300 5300   PI 3.8 3.9 3.1 3.2 3.8 3.6 3.3   Power (Centeno) 4.0 4.0 4.1 4.0 4.0 4.0 3.9    4900 4900 4900 4900 4900 4900   Pulsatility Intermittent pulse Intermittent pulse Intermittent pulse Intermittent pulse Intermittent pulse Intermittent pulse -       Acute hyperglycemia  -HgA1C 6.6  -Endocrine following    Moderate malnutrition  - Boost glucose control added 1/27   - Prealbumin is 23    Morbid obesity  -Weight down considerably since admit with diuresis.    ANJELICA (acute kidney injury)  -Creatinine was 3.0 on admit and got as high as 4.3 at OSH prior to transfer. Renal function was normal 8 months ago.  -Trending down today.     Cardiogenic shock  -NICM; Likely Etoh induced  -Transferred from [facility] with IABP at 1:1 for further level of care. IABP removed 1/25 and replaced 2/3.   -S/P HMIII on 2/3.     Deep vein thrombosis (DVT) of right lower extremity  -Unsure of timing but was on eliquis PTA.   -Will anticoag with heparin/warfarin post VAD.     AICD (automatic cardioverter/defibrillator) present  -Medtronic single chamber ICD placed 2019 for primary prevention      Liu Osorio MD  Heart Transplant  [facility]

## 2020-02-16 NOTE — SUBJECTIVE & OBJECTIVE
"Interval HPI:   Overnight events: Fasting BG slightly elevated. Tolerating diet well. Denies eating overnight.   Eatin%  Nausea: No  Hypoglycemia and intervention: No  Fever: No  TPN and/or TF: No  If yes, type of TF/TPN and rate: none    BP (!) 88/0 (BP Location: Left arm, Patient Position: Lying)   Pulse (!) 111   Temp 98 °F (36.7 °C) (Oral)   Resp 20   Ht 5' 7" (1.702 m)   Wt 76 kg (167 lb 8.8 oz)   SpO2 100%   BMI 26.24 kg/m²     Labs Reviewed and Include    Recent Labs   Lab 20  0300 20  1040 20  1200   *  156* 154*  --    CALCIUM 9.0  9.0 9.2  --    ALBUMIN 2.4*  --   --    PROT 7.7  --   --      141 139  --    K 3.9  3.9  3.9 4.1 4.0   CO2 32*  32* 34*  --    CL 97  97 96  --    BUN 37*  37* 33*  --    CREATININE 1.2  1.2 1.2  --    ALKPHOS 117  --   --    ALT 23  --   --    AST 25  --   --    BILITOT 0.7  --   --      Lab Results   Component Value Date    WBC 19.00 (H) 2020    HGB 7.3 (L) 2020    HCT 23.8 (L) 2020    MCV 83 2020     2020     No results for input(s): TSH, FREET4 in the last 168 hours.  Lab Results   Component Value Date    HGBA1C 6.6 (H) 2020       Nutritional status:   Body mass index is 26.24 kg/m².  Lab Results   Component Value Date    ALBUMIN 2.4 (L) 2020    ALBUMIN 2.6 (L) 02/15/2020    ALBUMIN 2.7 (L) 2020    ALBUMIN 2.7 (L) 2020     Lab Results   Component Value Date    PREALBUMIN 13 (L) 02/15/2020    PREALBUMIN 14 (L) 2020    PREALBUMIN 23 2020       Estimated Creatinine Clearance: 65 mL/min (based on SCr of 1.2 mg/dL).    Accu-Checks  Recent Labs     20  1608 20  2211   POCTGLUCOSE 134* 129*       Current Medications and/or Treatments Impacting Glycemic Control  Immunotherapy:    Immunosuppressants     None        Steroids:   Hormones (From admission, onward)    None        Pressors:    Autonomic Drugs (From admission, onward)    None    "     Hyperglycemia/Diabetes Medications:   Antihyperglycemics (From admission, onward)    None

## 2020-02-16 NOTE — ASSESSMENT & PLAN NOTE
Non-ischemic cardiomyopathy  Neuro:   - Pain control: scheduled tylenol. Pain controlled without opioids  - No sedation.      Pulmonary:   - Extubated; satting well on RA  - Radha - weaned off  - CPT q6hrs  - Daily CXR pending     Cardiac:  - MAP goal >65  - Epi weaned off  -  at 5  - Cardene OFF  - amlodipine 10 qd  - Increased LVAD speed 5300 and flows to 4.9 per CTS  - LDH trending down  - PICC in place  - Lasix gtt at 15  - scheduled diuril 250 daily     Renal:     Intake/Output Summary (Last 24 hours) at 2/16/2020 0628  Last data filed at 2/16/2020 0600  Gross per 24 hour   Intake 2133.2 ml   Output 3795 ml   Net -1661.8 ml     - monitor UOP  - lasix gtt at 15  - BUN/ Cr stable at 37/1.2     Fluids/Electrolytes/Nutrition/GI:   -Nutritional status: mechanical soft diet  - bar Mg, K  - SLP c/s  - decreased 250 free water flushes to q8hrs  - bowel regimen: Miralax and docusate      Hematology/Oncology:  -H/H stable 7.3/23.8  -INR/Plts 1.2/156  -Trend CBC  -coumadin 7.5 at 5 pm   -hep @ 1200u/hr with PTT goal 45 - 54  -  mg qd     Infectious Disease:   -Afebrile  -Leukocytosis WBC 19, stable  -Abx: completed course of vanc and ancef     Endocrine:  -Glucose goal of 140-180  -Insulin gtt per endo     Dispo:  -Stepdown to CTSU today.

## 2020-02-17 PROBLEM — D72.829 LEUKOCYTOSIS: Status: ACTIVE | Noted: 2020-02-17

## 2020-02-17 LAB
ALBUMIN SERPL BCP-MCNC: 2.4 G/DL (ref 3.5–5.2)
ALBUMIN SERPL BCP-MCNC: 2.4 G/DL (ref 3.5–5.2)
ALP SERPL-CCNC: 118 U/L (ref 55–135)
ALP SERPL-CCNC: 118 U/L (ref 55–135)
ALT SERPL W/O P-5'-P-CCNC: 26 U/L (ref 10–44)
ALT SERPL W/O P-5'-P-CCNC: 26 U/L (ref 10–44)
ANION GAP SERPL CALC-SCNC: 11 MMOL/L (ref 8–16)
ANION GAP SERPL CALC-SCNC: 9 MMOL/L (ref 8–16)
ANISOCYTOSIS BLD QL SMEAR: SLIGHT
APTT BLDCRRT: 28 SEC (ref 21–32)
APTT BLDCRRT: 35.9 SEC (ref 21–32)
APTT BLDCRRT: 84 SEC (ref 21–32)
AST SERPL-CCNC: 30 U/L (ref 10–40)
AST SERPL-CCNC: 30 U/L (ref 10–40)
BASOPHILS # BLD AUTO: ABNORMAL K/UL (ref 0–0.2)
BASOPHILS NFR BLD: 0 % (ref 0–1.9)
BILIRUB DIRECT SERPL-MCNC: 0.4 MG/DL (ref 0.1–0.3)
BILIRUB DIRECT SERPL-MCNC: 0.4 MG/DL (ref 0.1–0.3)
BILIRUB SERPL-MCNC: 0.6 MG/DL (ref 0.1–1)
BILIRUB SERPL-MCNC: 0.6 MG/DL (ref 0.1–1)
BNP SERPL-MCNC: 637 PG/ML (ref 0–99)
BUN SERPL-MCNC: 30 MG/DL (ref 6–20)
BUN SERPL-MCNC: 30 MG/DL (ref 6–20)
BUN SERPL-MCNC: 31 MG/DL (ref 6–20)
BUN SERPL-MCNC: 31 MG/DL (ref 6–20)
CALCIUM SERPL-MCNC: 9 MG/DL (ref 8.7–10.5)
CALCIUM SERPL-MCNC: 9.4 MG/DL (ref 8.7–10.5)
CHLORIDE SERPL-SCNC: 92 MMOL/L (ref 95–110)
CHLORIDE SERPL-SCNC: 92 MMOL/L (ref 95–110)
CHLORIDE SERPL-SCNC: 93 MMOL/L (ref 95–110)
CHLORIDE SERPL-SCNC: 93 MMOL/L (ref 95–110)
CO2 SERPL-SCNC: 29 MMOL/L (ref 23–29)
CO2 SERPL-SCNC: 33 MMOL/L (ref 23–29)
CREAT SERPL-MCNC: 1.1 MG/DL (ref 0.5–1.4)
CREAT SERPL-MCNC: 1.1 MG/DL (ref 0.5–1.4)
CREAT SERPL-MCNC: 1.2 MG/DL (ref 0.5–1.4)
CREAT SERPL-MCNC: 1.2 MG/DL (ref 0.5–1.4)
CRP SERPL-MCNC: 95.7 MG/L (ref 0–8.2)
DIFFERENTIAL METHOD: ABNORMAL
EOSINOPHIL # BLD AUTO: ABNORMAL K/UL (ref 0–0.5)
EOSINOPHIL NFR BLD: 2 % (ref 0–8)
ERYTHROCYTE [DISTWIDTH] IN BLOOD BY AUTOMATED COUNT: 19.3 % (ref 11.5–14.5)
EST. GFR  (AFRICAN AMERICAN): >60 ML/MIN/1.73 M^2
EST. GFR  (NON AFRICAN AMERICAN): >60 ML/MIN/1.73 M^2
GLUCOSE SERPL-MCNC: 115 MG/DL (ref 70–110)
GLUCOSE SERPL-MCNC: 162 MG/DL (ref 70–110)
GLUCOSE SERPL-MCNC: 166 MG/DL (ref 70–110)
GLUCOSE SERPL-MCNC: 166 MG/DL (ref 70–110)
HCT VFR BLD AUTO: 23.9 % (ref 40–54)
HGB BLD-MCNC: 7 G/DL (ref 14–18)
HYPOCHROMIA BLD QL SMEAR: ABNORMAL
IMM GRANULOCYTES # BLD AUTO: ABNORMAL K/UL (ref 0–0.04)
IMM GRANULOCYTES NFR BLD AUTO: ABNORMAL % (ref 0–0.5)
INR PPP: 1.5 (ref 0.8–1.2)
LDH SERPL L TO P-CCNC: 558 U/L (ref 110–260)
LYMPHOCYTES # BLD AUTO: ABNORMAL K/UL (ref 1–4.8)
LYMPHOCYTES NFR BLD: 12 % (ref 18–48)
MAGNESIUM SERPL-MCNC: 2.1 MG/DL (ref 1.6–2.6)
MAGNESIUM SERPL-MCNC: 2.2 MG/DL (ref 1.6–2.6)
MAGNESIUM SERPL-MCNC: 2.3 MG/DL (ref 1.6–2.6)
MCH RBC QN AUTO: 24.6 PG (ref 27–31)
MCHC RBC AUTO-ENTMCNC: 29.3 G/DL (ref 32–36)
MCV RBC AUTO: 84 FL (ref 82–98)
MONOCYTES # BLD AUTO: ABNORMAL K/UL (ref 0.3–1)
MONOCYTES NFR BLD: 3 % (ref 4–15)
MYELOCYTES NFR BLD MANUAL: 2 %
NEUTROPHILS NFR BLD: 81 % (ref 38–73)
NRBC BLD-RTO: 2 /100 WBC
OVALOCYTES BLD QL SMEAR: ABNORMAL
PHOSPHATE SERPL-MCNC: 2.7 MG/DL (ref 2.7–4.5)
PLATELET # BLD AUTO: 397 K/UL (ref 150–350)
PLATELET BLD QL SMEAR: ABNORMAL
PMV BLD AUTO: 9.6 FL (ref 9.2–12.9)
POCT GLUCOSE: 114 MG/DL (ref 70–110)
POCT GLUCOSE: 118 MG/DL (ref 70–110)
POCT GLUCOSE: 149 MG/DL (ref 70–110)
POIKILOCYTOSIS BLD QL SMEAR: SLIGHT
POLYCHROMASIA BLD QL SMEAR: ABNORMAL
POTASSIUM SERPL-SCNC: 4.4 MMOL/L (ref 3.5–5.1)
POTASSIUM SERPL-SCNC: 4.6 MMOL/L (ref 3.5–5.1)
POTASSIUM SERPL-SCNC: 4.7 MMOL/L (ref 3.5–5.1)
POTASSIUM SERPL-SCNC: 4.7 MMOL/L (ref 3.5–5.1)
PROT SERPL-MCNC: 7.9 G/DL (ref 6–8.4)
PROT SERPL-MCNC: 7.9 G/DL (ref 6–8.4)
PROTHROMBIN TIME: 14.6 SEC (ref 9–12.5)
RBC # BLD AUTO: 2.84 M/UL (ref 4.6–6.2)
SODIUM SERPL-SCNC: 132 MMOL/L (ref 136–145)
SODIUM SERPL-SCNC: 134 MMOL/L (ref 136–145)
SODIUM SERPL-SCNC: 135 MMOL/L (ref 136–145)
SODIUM SERPL-SCNC: 135 MMOL/L (ref 136–145)
TARGETS BLD QL SMEAR: ABNORMAL
WBC # BLD AUTO: 21.32 K/UL (ref 3.9–12.7)

## 2020-02-17 PROCEDURE — 80048 BASIC METABOLIC PNL TOTAL CA: CPT | Mod: 91

## 2020-02-17 PROCEDURE — 99233 SBSQ HOSP IP/OBS HIGH 50: CPT | Mod: ,,, | Performed by: SURGERY

## 2020-02-17 PROCEDURE — 85007 BL SMEAR W/DIFF WBC COUNT: CPT

## 2020-02-17 PROCEDURE — 99233 SBSQ HOSP IP/OBS HIGH 50: CPT | Mod: ,,, | Performed by: INTERNAL MEDICINE

## 2020-02-17 PROCEDURE — 99900035 HC TECH TIME PER 15 MIN (STAT)

## 2020-02-17 PROCEDURE — 25000003 PHARM REV CODE 250: Performed by: STUDENT IN AN ORGANIZED HEALTH CARE EDUCATION/TRAINING PROGRAM

## 2020-02-17 PROCEDURE — 27000248 HC VAD-ADDITIONAL DAY

## 2020-02-17 PROCEDURE — 83615 LACTATE (LD) (LDH) ENZYME: CPT

## 2020-02-17 PROCEDURE — 85027 COMPLETE CBC AUTOMATED: CPT

## 2020-02-17 PROCEDURE — 92526 ORAL FUNCTION THERAPY: CPT

## 2020-02-17 PROCEDURE — 83735 ASSAY OF MAGNESIUM: CPT | Mod: 91

## 2020-02-17 PROCEDURE — 25000003 PHARM REV CODE 250: Performed by: THORACIC SURGERY (CARDIOTHORACIC VASCULAR SURGERY)

## 2020-02-17 PROCEDURE — 93750 PR INTERROGATE VENT ASSIST DEV, IN PERSON, W PHYSICIAN ANALYSIS: ICD-10-PCS | Mod: ,,, | Performed by: INTERNAL MEDICINE

## 2020-02-17 PROCEDURE — 86140 C-REACTIVE PROTEIN: CPT

## 2020-02-17 PROCEDURE — 99233 PR SUBSEQUENT HOSPITAL CARE,LEVL III: ICD-10-PCS | Mod: ,,, | Performed by: SURGERY

## 2020-02-17 PROCEDURE — 97116 GAIT TRAINING THERAPY: CPT

## 2020-02-17 PROCEDURE — 99233 PR SUBSEQUENT HOSPITAL CARE,LEVL III: ICD-10-PCS | Mod: ,,, | Performed by: INTERNAL MEDICINE

## 2020-02-17 PROCEDURE — 63600175 PHARM REV CODE 636 W HCPCS: Performed by: STUDENT IN AN ORGANIZED HEALTH CARE EDUCATION/TRAINING PROGRAM

## 2020-02-17 PROCEDURE — 93750 INTERROGATION VAD IN PERSON: CPT | Mod: ,,, | Performed by: INTERNAL MEDICINE

## 2020-02-17 PROCEDURE — 85730 THROMBOPLASTIN TIME PARTIAL: CPT

## 2020-02-17 PROCEDURE — 85730 THROMBOPLASTIN TIME PARTIAL: CPT | Mod: 91

## 2020-02-17 PROCEDURE — 94761 N-INVAS EAR/PLS OXIMETRY MLT: CPT

## 2020-02-17 PROCEDURE — 83880 ASSAY OF NATRIURETIC PEPTIDE: CPT

## 2020-02-17 PROCEDURE — 20600001 HC STEP DOWN PRIVATE ROOM

## 2020-02-17 PROCEDURE — 97530 THERAPEUTIC ACTIVITIES: CPT

## 2020-02-17 PROCEDURE — 80076 HEPATIC FUNCTION PANEL: CPT

## 2020-02-17 PROCEDURE — 63600175 PHARM REV CODE 636 W HCPCS: Performed by: SURGERY

## 2020-02-17 PROCEDURE — 84100 ASSAY OF PHOSPHORUS: CPT

## 2020-02-17 PROCEDURE — 85610 PROTHROMBIN TIME: CPT

## 2020-02-17 PROCEDURE — A4216 STERILE WATER/SALINE, 10 ML: HCPCS | Performed by: THORACIC SURGERY (CARDIOTHORACIC VASCULAR SURGERY)

## 2020-02-17 RX ORDER — WARFARIN 7.5 MG/1
7.5 TABLET ORAL ONCE
Status: COMPLETED | OUTPATIENT
Start: 2020-02-17 | End: 2020-02-17

## 2020-02-17 RX ORDER — HEPARIN SODIUM 10000 [USP'U]/100ML
1200 INJECTION, SOLUTION INTRAVENOUS CONTINUOUS
Status: DISCONTINUED | OUTPATIENT
Start: 2020-02-17 | End: 2020-02-18

## 2020-02-17 RX ORDER — HEPARIN SODIUM 10000 [USP'U]/100ML
1000 INJECTION, SOLUTION INTRAVENOUS CONTINUOUS
Status: DISCONTINUED | OUTPATIENT
Start: 2020-02-17 | End: 2020-02-17

## 2020-02-17 RX ORDER — BENZONATATE 100 MG/1
100 CAPSULE ORAL 3 TIMES DAILY PRN
Status: DISCONTINUED | OUTPATIENT
Start: 2020-02-17 | End: 2020-02-27 | Stop reason: HOSPADM

## 2020-02-17 RX ADMIN — HYDRALAZINE HYDROCHLORIDE 25 MG: 25 TABLET, FILM COATED ORAL at 08:02

## 2020-02-17 RX ADMIN — CHLOROTHIAZIDE SODIUM 250 MG: 500 INJECTION, POWDER, LYOPHILIZED, FOR SOLUTION INTRAVENOUS at 11:02

## 2020-02-17 RX ADMIN — ACETAMINOPHEN 650 MG: 325 TABLET ORAL at 11:02

## 2020-02-17 RX ADMIN — ACETAMINOPHEN 650 MG: 325 TABLET ORAL at 05:02

## 2020-02-17 RX ADMIN — DOCUSATE SODIUM 100 MG: 100 CAPSULE, LIQUID FILLED ORAL at 10:02

## 2020-02-17 RX ADMIN — HYDRALAZINE HYDROCHLORIDE 25 MG: 25 TABLET, FILM COATED ORAL at 05:02

## 2020-02-17 RX ADMIN — PANTOPRAZOLE SODIUM 40 MG: 40 TABLET, DELAYED RELEASE ORAL at 10:02

## 2020-02-17 RX ADMIN — Medication 800 MG: at 08:02

## 2020-02-17 RX ADMIN — Medication 10 ML: at 11:02

## 2020-02-17 RX ADMIN — DOCUSATE SODIUM 100 MG: 100 CAPSULE, LIQUID FILLED ORAL at 08:02

## 2020-02-17 RX ADMIN — HEPARIN SODIUM AND DEXTROSE 1300 UNITS/HR: 10000; 5 INJECTION INTRAVENOUS at 05:02

## 2020-02-17 RX ADMIN — POTASSIUM & SODIUM PHOSPHATES POWDER PACK 280-160-250 MG 1 PACKET: 280-160-250 PACK at 04:02

## 2020-02-17 RX ADMIN — POTASSIUM CHLORIDE 20 MEQ: 20 SOLUTION ORAL at 10:02

## 2020-02-17 RX ADMIN — OXYCODONE HYDROCHLORIDE 10 MG: 10 TABLET ORAL at 08:02

## 2020-02-17 RX ADMIN — POTASSIUM & SODIUM PHOSPHATES POWDER PACK 280-160-250 MG 1 PACKET: 280-160-250 PACK at 07:02

## 2020-02-17 RX ADMIN — FERROUS GLUCONATE TAB 324 MG (37.5 MG ELEMENTAL IRON) 324 MG: 324 (37.5 FE) TAB at 08:02

## 2020-02-17 RX ADMIN — AMLODIPINE BESYLATE 10 MG: 10 TABLET ORAL at 10:02

## 2020-02-17 RX ADMIN — FUROSEMIDE 10 MG/HR: 10 INJECTION, SOLUTION INTRAMUSCULAR; INTRAVENOUS at 05:02

## 2020-02-17 RX ADMIN — BENZONATATE 100 MG: 100 CAPSULE ORAL at 08:02

## 2020-02-17 RX ADMIN — POTASSIUM & SODIUM PHOSPHATES POWDER PACK 280-160-250 MG 1 PACKET: 280-160-250 PACK at 11:02

## 2020-02-17 RX ADMIN — HYDRALAZINE HYDROCHLORIDE 25 MG: 25 TABLET, FILM COATED ORAL at 01:02

## 2020-02-17 RX ADMIN — Medication 800 MG: at 03:02

## 2020-02-17 RX ADMIN — DOBUTAMINE IN DEXTROSE 5 MCG/KG/MIN: 200 INJECTION, SOLUTION INTRAVENOUS at 05:02

## 2020-02-17 RX ADMIN — Medication 10 ML: at 06:02

## 2020-02-17 RX ADMIN — POLYETHYLENE GLYCOL 3350 17 G: 17 POWDER, FOR SOLUTION ORAL at 10:02

## 2020-02-17 RX ADMIN — Medication 10 ML: at 12:02

## 2020-02-17 RX ADMIN — WARFARIN SODIUM 7.5 MG: 7.5 TABLET ORAL at 04:02

## 2020-02-17 RX ADMIN — ASPIRIN 325 MG ORAL TABLET 325 MG: 325 PILL ORAL at 10:02

## 2020-02-17 RX ADMIN — Medication 800 MG: at 10:02

## 2020-02-17 NOTE — NURSING
Pt lying in bed with mom at bedside. Pt still has NOT started his education. His mother states that she has been pushing him but he will not do it. I told pt that if he is not VAD educated, he will NOT be discharged from the hospital. I gave pt the goal to finish pages 1-10 in his workbook.

## 2020-02-17 NOTE — PLAN OF CARE
Problem: Occupational Therapy Goal  Goal: Occupational Therapy Goal  Description  Goals to be met by: 2/24/20     Patient will increase functional independence with ADLs by performing:    Feeding with [place].  UE Dressing with Supervision.  LE Dressing with Supervision.  Grooming while standing at sink with Supervision.  Toileting from toilet with Supervision for hygiene and clothing management.   Toilet transfer to toilet with Supervision.  Pt will be (I) with VAD management during ADL.    Outcome: Ongoing, Progressing   The above goals remain appropriate. KARLY Artis  2/17/2020

## 2020-02-17 NOTE — ASSESSMENT & PLAN NOTE
Neuro:   - Pain control: scheduled tylenol. Pain controlled without opioids  - No sedation.      Pulmonary:   - Extubated; satting well on RA  - Radha - weaned off  - CPT q6hrs  - Daily CXR pending     Cardiac:  - MAP goal >65  - Epi, cardene: OFF  -  at 5  - amlodipine 10 qd  - LVAD speed 5300 and flow 4.9  - daily LDH up to 558 from 381  - PICC in place  - Lasix gtt at 10  - diuril 250 daily     Renal:   - monitor UOP  - lasix gtt at 10  - BUN/ Cr stable at 30/1.1 (from 37/1.2)     Fluids/Electrolytes/Nutrition/GI:   -Nutritional status: mechanical soft diet  -discontinue boost supplements  - bar Mg, K  - 250 free water flushes to q8hrs  - bowel regimen: Miralax and docusate      Hematology/Oncology:  -H/H stable  7.0/23.9 (from 7.3/23.8)  -INR/Plts 1.5/397  -Trend CBC  -coumadin 7.5 at 5 pm   -hep @ 1300u/hr with PTT goal 45 - 54  -  mg qd     Infectious Disease:   -Afebrile  -Leukocytosis WBC 21.32 from 19, stable  -Abx: completed course of vanc and ancef     Endocrine:  -Glucose goal of 140-180  -Insulin gtt per endo     Dispo:  -Stepdown to CTSU today.

## 2020-02-17 NOTE — SUBJECTIVE & OBJECTIVE
Interval History: No complaints this am. Feeling well today.    Continuous Infusions:   DOBUTamine 5 mcg/kg/min (02/17/20 0600)    furosemide (LASIX) 2 mg/mL continuous infusion (non-titrating) 15 mg/hr (02/17/20 0600)    heparin (porcine) in 5 % dex 1,300 Units/hr (02/17/20 0600)    nicardipine 2.5 mg/hr (02/11/20 0600)     Scheduled Meds:   acetaminophen  650 mg Oral Q6H    amLODIPine  10 mg Oral Daily    aspirin  325 mg Per NG tube Daily    chlorothiazide (DIURIL) IVPB  250 mg Intravenous Q24H    chlorothiazide (DIURIL) IVPB  500 mg Intravenous Once    docusate sodium  100 mg Oral BID    ferrous gluconate  324 mg Oral Daily with breakfast    hydrALAZINE  25 mg Oral Q8H    magnesium oxide  800 mg Oral TID    pantoprazole  40 mg Oral Daily    polyethylene glycol  17 g Oral Daily    potassium chloride 10%  20 mEq Per NG tube BID    potassium, sodium phosphates  1 packet Oral QID (WM & HS)    sodium chloride 0.9%  10 mL Intravenous Q6H     PRN Meds:albumin human 5%, albuterol sulfate, bisacodyL, Dextrose 10% Bolus, Dextrose 10% Bolus, Dextrose 10% Bolus, Dextrose 10% Bolus, Dextrose 10% Bolus, Dextrose 10% Bolus, diphenhydrAMINE, glucagon (human recombinant), glucose, glucose, hydrALAZINE, insulin aspart U-100, magnesium hydroxide 400 mg/5 ml, magnesium sulfate IVPB, oxyCODONE, oxyCODONE, potassium chloride 10%, potassium chloride 10%, potassium chloride 10%, potassium chloride 10%, potassium, sodium phosphates, potassium, sodium phosphates, potassium, sodium phosphates, sodium chloride 0.9%, Flushing PICC Protocol **AND** sodium chloride 0.9% **AND** sodium chloride 0.9%    Review of patient's allergies indicates:   Allergen Reactions    Iodine and iodide containing products      Objective:     Vital Signs (Most Recent):  Temp: 98.1 °F (36.7 °C) (02/17/20 0700)  Pulse: 106 (02/17/20 0700)  Resp: (!) 22 (02/17/20 0300)  BP: (!) 82/0 (02/17/20 0715)  SpO2: 98 % (02/17/20 0700) Vital Signs (24h  Range):  Temp:  [98 °F (36.7 °C)-99.2 °F (37.3 °C)] 98.1 °F (36.7 °C)  Pulse:  [] 106  Resp:  [18-29] 22  SpO2:  [97 %-100 %] 98 %  BP: ()/(0-67) 82/0  Arterial Line BP: ()/(64-79) 97/79     Patient Vitals for the past 72 hrs (Last 3 readings):   Weight   02/17/20 0713 89 kg (196 lb 3.4 oz)   02/15/20 0300 76 kg (167 lb 8.8 oz)     Body mass index is 30.73 kg/m².      Intake/Output Summary (Last 24 hours) at 2/17/2020 0759  Last data filed at 2/17/2020 0600  Gross per 24 hour   Intake 1664.5 ml   Output 3329 ml   Net -1664.5 ml     Physical Exam   Constitutional: He is oriented to person, place, and time. He appears well-developed and well-nourished.   HENT:   Head: Normocephalic and atraumatic.   Cardiovascular: Normal rate and regular rhythm.   VAD hum.    Pulmonary/Chest: Effort normal. No respiratory distress.   Abdominal: Soft.   Genitourinary:   Genitourinary Comments: Mc cath in place   Neurological: He is alert and oriented to person, place, and time.   Skin: Skin is warm and dry.   Psychiatric: He has a normal mood and affect. His behavior is normal.   Nursing note and vitals reviewed.    Significant Labs:  CBC:  Recent Labs   Lab 02/15/20  0400 02/16/20  0300 02/17/20  0347   WBC 18.67* 19.00* 21.32*   RBC 2.97* 2.87* 2.84*   HGB 7.5* 7.3* 7.0*   HCT 24.6* 23.8* 23.9*    348 397*   MCV 83 83 84   MCH 25.3* 25.4* 24.6*   MCHC 30.5* 30.7* 29.3*     BNP:  Recent Labs   Lab 02/12/20  0400 02/14/20  0400 02/17/20  0347   BNP 1,356* 808* 637*     CMP:  Recent Labs   Lab 02/15/20  0400  02/16/20  0300  02/16/20  1620 02/16/20  1725 02/16/20  2205 02/17/20  0347   *  162*   < > 156*  156*   < > 112*  --  138* 166*  166*   CALCIUM 9.1  9.1   < > 9.0  9.0   < > 7.5*  --  8.7 9.0  9.0   ALBUMIN 2.6*  --  2.4*  --   --   --   --  2.4*  2.4*   PROT 7.8  --  7.7  --   --   --   --  7.9  7.9   *  135*   < > 141  141   < > 140  --  136 135*  135*   K 4.4  4.4  4.4   4.4   < > 3.9  3.9  3.9   < > 3.0* 4.0 4.9 4.7  4.7   CO2 34*  34*   < > 32*  32*   < > 27  --  32* 33*  33*   CL 93*  93*   < > 97  97   < > 104  --  95 93*  93*   BUN 38*  38*   < > 37*  37*   < > 28*  --  30* 30*  30*   CREATININE 1.2  1.2   < > 1.2  1.2   < > 0.8  --  1.2 1.1  1.1   ALKPHOS 125  --  117  --   --   --   --  118  118   ALT 29  --  23  --   --   --   --  26  26   AST 29  --  25  --   --   --   --  30  30   BILITOT 0.7  --  0.7  --   --   --   --  0.6  0.6    < > = values in this interval not displayed.      Coagulation:   Recent Labs   Lab 02/15/20  0400  02/16/20  0300  02/16/20  1725 02/16/20  2249 02/17/20  0347 02/17/20  0613   INR 1.2  --  1.2  --   --   --  1.5*  --    APTT 52.9*  52.9*  52.9*   < > 40.7*  40.7*  40.7*   < > 29.2 42.6*  --  84.0*    < > = values in this interval not displayed.     LDH:  Recent Labs   Lab 02/15/20  0400 02/16/20  0300 02/17/20  0347   * 381* 558*     Microbiology:  Microbiology Results (last 7 days)     ** No results found for the last 168 hours. **        I have reviewed all pertinent labs within the past 24 hours.    Estimated Creatinine Clearance: 80.8 mL/min (based on SCr of 1.1 mg/dL).    Diagnostic Results:  I have reviewed and interpreted all pertinent imaging results/findings within the past 24 hours.

## 2020-02-17 NOTE — PT/OT/SLP PROGRESS
Occupational Therapy   Treatment    Name: Saba Lott  MRN: [id number]  Admitting Diagnosis:  Presence of left ventricular assist device (LVAD)  10 Days Post-Op    Recommendations:     Discharge Recommendations: home health OT, home health PT  Discharge Equipment Recommendations:  none  Barriers to discharge:  None    Assessment:     Saba Lott is a 54 y.o. male with a medical diagnosis of Presence of left ventricular assist device (LVAD).   Performance deficits affecting function are weakness, impaired self care skills, impaired balance, impaired functional mobilty, impaired endurance, gait instability, decreased upper extremity function, decreased safety awareness, impaired cardiopulmonary response to activity, pain.     Rehab Prognosis:  Good; patient would benefit from acute skilled OT services to address these deficits and reach maximum level of function.       Plan:     Patient to be seen 6 x/week to address the above listed problems via self-care/home management, therapeutic activities, therapeutic exercises  · Plan of Care Expires: 03/11/20  · Plan of Care Reviewed with: patient, mother    Subjective     Pain/Comfort:  · Pain Rating 1: 0/10  · Pain Rating Post-Intervention 1: 0/10    Objective:     Communicated with: RN prior to session.  Patient found supine with telemetry, PICC line, barton catheter, LVAD upon OT entry to room.    General Precautions: Standard, LVAD, fall, sternal   Orthopedic Precautions:N/A   Braces:       Occupational Performance:     Bed Mobility:    · Patient completed Supine to Sit with minimum assistance     Functional Mobility/Transfers:  · Patient completed Sit > Stand Transfer with minimum assistance  with  no assistive device from EOB  · Patient completed Stand to Sit with Mod A to chair for controlled descent  · Functional Mobility: CGA around room during ADL tasks    Activities of Daily Living:  · Grooming: modified independence for tasks  · Upper Body Dressing: minimum  assistance    · Lower Body Dressing: moderate assistance        AMPAC 6 Click ADL: 18    Treatment & Education:  Pt ed on OT POC  Pt transitioned to battery power and set-up consolidation bag with SBA and cueing  Pt stood for self-care tasks x 3 min with CGA <> close SBA    Patient left up in chair with all lines intact and call button in reachEducation:      GOALS:   Multidisciplinary Problems     Occupational Therapy Goals        Problem: Occupational Therapy Goal    Goal Priority Disciplines Outcome Interventions   Occupational Therapy Goal     OT, PT/OT Ongoing, Progressing    Description:  Goals to be met by: 2/24/20     Patient will increase functional independence with ADLs by performing:    Feeding with [place].  UE Dressing with Supervision.  LE Dressing with Supervision.  Grooming while standing at sink with Supervision.  Toileting from toilet with Supervision for hygiene and clothing management.   Toilet transfer to toilet with Supervision.  Pt will be (I) with VAD management during ADL.               Multidisciplinary Problems (Resolved)        Problem: Occupational Therapy Goal    Goal Priority Disciplines Outcome Interventions   Occupational Therapy Goal   (Resolved)     OT, PT/OT Met    Description:  Goals to be met by: 7 days 1/30/20     Patient will increase functional independence with ADLs by performing:    Pt to complete UE dressing with set-up-MET  Pt to complete LE dressing with SBA with AE-MET   Pt to complete toileting with supervision.--MET  Pt to complete standing g/h skills with supervision.--MET  Pt to complete t/f to commode, bed and chair with SBA--MET                         Time Tracking:     OT Date of Treatment: 02/17/20  OT Start Time: 1336  OT Stop Time: 1403  OT Total Time (min): 27 min    Billable Minutes:Therapeutic Activity 25    KARLY Artis  2/17/2020

## 2020-02-17 NOTE — SUBJECTIVE & OBJECTIVE
Interval History/Significant Events: NAEON. Patient has step down orders. Waiting for a bed.     -lasix down to 10  -coumadin 7.5  -daily LD  -step down      Follow-up For: Procedure(s) (LRB):  CLOSURE, WOUND, STERNUM (N/A)  WASHOUT  INSERTION, GRAFT, PERICARDIUM  APPLICATION, WOUND VAC    Objective:     Vital Signs (Most Recent):  Temp: 98.1 °F (36.7 °C) (02/17/20 0700)  Pulse: (!) 114 (02/17/20 0921)  Resp: (!) 28 (02/17/20 0921)  BP: (!) 82/0 (02/17/20 0715)  SpO2: 98 % (02/17/20 0921) Vital Signs (24h Range):  Temp:  [98 °F (36.7 °C)-99.2 °F (37.3 °C)] 98.1 °F (36.7 °C)  Pulse:  [] 114  Resp:  [18-29] 28  SpO2:  [98 %-100 %] 98 %  BP: ()/(0-67) 82/0  Arterial Line BP: ()/(70-79) 97/79     Weight: 89 kg (196 lb 3.4 oz)  Body mass index is 30.73 kg/m².      Intake/Output Summary (Last 24 hours) at 2/17/2020 1007  Last data filed at 2/17/2020 0700  Gross per 24 hour   Intake 1518.8 ml   Output 3134 ml   Net -1615.2 ml       Physical Exam   Constitutional: He is oriented to person, place, and time. He appears well-developed and well-nourished.   HENT:   Head: Normocephalic and atraumatic.   Eyes: Pupils are equal, round, and reactive to light. EOM are normal.   Neck: Normal range of motion. Neck supple.   Cardiovascular: Normal rate and regular rhythm.   LVAD in place  Introducer in place   Pulmonary/Chest: Effort normal. No respiratory distress.   Satting well on RA   Abdominal: Soft.   Neurological: He is alert and oriented to person, place, and time.   Skin: Skin is warm and dry.   Psychiatric: He has a normal mood and affect. His behavior is normal.   Nursing note and vitals reviewed.      Vents:  n/a    Lines/Drains/Airways     Peripherally Inserted Central Catheter Line                 PICC Triple Lumen 02/08/20 1530 right basilic 8 days          Central Venous Catheter Line                 Percutaneous Central Line Insertion/Assessment - Quad lumen  02/06/20 0742 11 days          Drain                  Urethral Catheter 02/12/20 1620 4 days          Line                 VAD 02/06/20 1047 Left ventricular assist device HeartMate 3 10 days                Significant Labs:    CBC/Anemia Profile:  Recent Labs   Lab 02/16/20 0300 02/17/20  0347   WBC 19.00* 21.32*   HGB 7.3* 7.0*   HCT 23.8* 23.9*    397*   MCV 83 84   RDW 19.2* 19.3*        Chemistries:  Recent Labs   Lab 02/16/20  0300 02/16/20  1620 02/16/20  1725 02/16/20  2205 02/17/20 0347     141   < > 140  --  136 135*  135*   K 3.9  3.9  3.9   < > 3.0* 4.0 4.9 4.7  4.7   CL 97  97   < > 104  --  95 93*  93*   CO2 32*  32*   < > 27  --  32* 33*  33*   BUN 37*  37*   < > 28*  --  30* 30*  30*   CREATININE 1.2  1.2   < > 0.8  --  1.2 1.1  1.1   CALCIUM 9.0  9.0   < > 7.5*  --  8.7 9.0  9.0   ALBUMIN 2.4*  --   --   --   --  2.4*  2.4*   PROT 7.7  --   --   --   --  7.9  7.9   BILITOT 0.7  --   --   --   --  0.6  0.6   ALKPHOS 117  --   --   --   --  118  118   ALT 23  --   --   --   --  26  26   AST 25  --   --   --   --  30  30   MG 2.1   < > 1.6  --  2.4 2.3   PHOS 1.9*  --   --   --   --  2.7    < > = values in this interval not displayed.       ABGs:   No results for input(s): PH, PCO2, HCO3, POCSATURATED, BE in the last 48 hours.  Coagulation:   Recent Labs   Lab 02/17/20 0347 02/17/20  0613   INR 1.5*  --    APTT  --  84.0*     All pertinent labs within the past 24 hours have been reviewed.    Significant Imaging:  I have reviewed and interpreted all pertinent imaging results/findings within the past 24 hours.

## 2020-02-17 NOTE — PLAN OF CARE
Problem: SLP Goal  Goal: SLP Goal  Description  Speech Language Pathology Goals  Goals expected to be met by 2/18/2020  1. Pt will participate in ongoing swallow assessment to determine safest, least restrictive diet  2. Educate Pt and family on S/S aspiration and aspiration precautions      Outcome: Ongoing, Progressing     Pt progressing with goals. REC: dental soft diet with THIN liquids, medications whole ok, provided standard aspiration precautions, small bites/sips, no straws and ST to f/u to monitor.     LARISSA Doyle., [facility]-SLP  Speech-Language Pathology  Pager: [phone]  2/17/2020

## 2020-02-17 NOTE — PT/OT/SLP PROGRESS
Physical Therapy Treatment    Patient Name:  Saba Lott   MRN:  [id number]    Recommendations:     Discharge Recommendations:  home health PT   Discharge Equipment Recommendations: none   Barriers to discharge: None    Assessment:     Saba Lott is a 54 y.o. male admitted with a medical diagnosis of Presence of left ventricular assist device (LVAD).  He presents with the following impairments/functional limitations:  weakness, impaired endurance, gait instability, impaired balance, impaired functional mobilty, decreased safety awareness. Pt tolerated activity with improved mobility reflected by increased distance ambulated with decreased assistance required. Pt participated in gait training with use of a platform walker this date, demo'd safety with platform walker management. Pt participated in discussion with PT regarding importance of maintaining sternal precautions, as well as anticipation that pt will likely require assistance from family for sit <> stand transfers until sternal precautions are lifted, given chronic structural LE impairments. Plan to initiate family training with mother at next session to address safe assistance with sit <> stand transfers. Pt and mother agreeable. Pt would continue to benefit from acute skilled therapy intervention to address deficits and progress toward prior level of function.       Rehab Prognosis: Good; patient would benefit from acute skilled PT services to address these deficits and reach maximum level of function.    Recent Surgery: Procedure(s) (LRB):  CLOSURE, WOUND, STERNUM (N/A)  WASHOUT  INSERTION, GRAFT, PERICARDIUM  APPLICATION, WOUND VAC 10 Days Post-Op    Plan:     During this hospitalization, patient to be seen 5 x/week to address the identified rehab impairments via gait training, therapeutic activities, therapeutic exercises, neuromuscular re-education and progress toward the following goals:    · Plan of Care Expires:  03/10/20    Subjective     Chief  Complaint: pt with no complaints   Patient/Family Comments/goals: to get better and return home   Pain/Comfort:  · Pain Rating 1: 0/10  · Pain Rating Post-Intervention 1: 0/10      Objective:     Communicated with RN prior to session.  Patient found up in chair with blood pressure cuff, telemetry, PICC line, LVAD, barton catheter upon PT entry to room.     General Precautions: Standard, fall, LVAD, sternal   Orthopedic Precautions:N/A   Braces: N/A     Functional Mobility:  · Transfers:     · Sit to Stand:  minimum assistance with no AD  · Gait: Pt ambulated 170 feet with platform rolling walker and contact guard assistance progressing to stand by assistance. Pt demo'd small step size, decreased foot clearance, wide SKYLER, excessive lateral sway. Pt with no LOB, no SOB, no dizziness, VSS throughout. Emergency bag present, no alarms sounded.       AM-PAC 6 CLICK MOBILITY  Turning over in bed (including adjusting bedclothes, sheets and blankets)?: 3  Sitting down on and standing up from a chair with arms (e.g., wheelchair, bedside commode, etc.): 3  Moving from lying on back to sitting on the side of the bed?: 3  Moving to and from a bed to a chair (including a wheelchair)?: 3  Need to walk in hospital room?: 3  Climbing 3-5 steps with a railing?: 2  Basic Mobility Total Score: 17       Therapeutic Activities and Exercises:   Pt switched LVAD from wall power to battery power then back to wall power following gait training. Pt required minimum assistance with cuing. Pt required assistance initiating turning of cable connectors, cuing and assistance for placement of contents into consolidation bag, cuing for placement of controller in consolidation bag. Pt required maximum assistance to don shoulder and waist strap.   Pt educated on role of PT/POC. Pt verbalized understanding.   Pt encouraged to only perform OOB mobility with assistance from nursing/therapy. Pt agreeable.   Pt encouraged to ambulate daily with  assistance/supervision from nursing/therapy. Pt agreeable.  Pt educated regarding 3/3 sternal precautions. Pt compliant throughout session.  Pt participated in discussion with PT regarding importance of maintaining sternal precautions, as well as anticipation that pt will likely require assistance from family for sit <> stand transfers until sternal precautions are lifted, given chronic structural LE impairments. Plan to initiate family training with mother at next session to address safe assistance with sit <> stand transfers. Pt and mother agreeable.   LVAD to wall power at beginning and end of session. No alarms sounded.     Patient left up in chair with all lines intact, call button in reach and RN notified..    GOALS:   Multidisciplinary Problems     Physical Therapy Goals        Problem: Physical Therapy Goal    Goal Priority Disciplines Outcome Goal Variances Interventions   Physical Therapy Goal     PT, PT/OT Ongoing, Progressing     Description:  Goals to be met by: 3/9/2020     Patient will increase functional independence with mobility by performin. Supine to sit with CGA- not met  2. Sit to stand transfer with Supervision- not met  3. Gait  x 150 feet with Supervision - not met                           Time Tracking:     PT Received On: 20  PT Start Time: 839     PT Stop Time: 917  PT Total Time (min): 38 min     Billable Minutes: Gait Training 15 mins  and Therapeutic Activity 23 mins     Treatment Type: Treatment  PT/PTA: PT     PTA Visit Number: 0     Maren Barnes, PT  2020

## 2020-02-17 NOTE — PLAN OF CARE
Plan of care discussed with patient.  Patient ambulating with assistance, fall precautions in place.Continuing to encourage sternal precautions, IS, and ambulation. LVAD DP and numbers WNL, smooth LVAD hum. Patient has no complaints of pain. Discussed medications and care. Heparin gtts, DBT gtts and lasix gtts infusing at prescribed rates. Encouraged workbook, reviewed education, filled in binder. Patient has no questions at this time. Will continue to monitor.     Stable with lactose free products

## 2020-02-17 NOTE — PROGRESS NOTES
02/17/2020  Miko Rievra    Current provider:  David Joshi MD      I, Miko Rivera, rounded on Saba Lott to ensure all mechanical assist device settings (IABP or VAD) were appropriate and all parameters were within limits.  I was able to ensure all back up equipment was present, the staff had no issues, and the Perfusion Department daily rounding was complete.    2:21 PM

## 2020-02-17 NOTE — CARE UPDATE
BG goal 140-180     Continue  low dose correction scale and BG monitoring ac/hs     ** Please call Endocrine for any BG related issues **     Discharge plans: TBD

## 2020-02-17 NOTE — NURSING TRANSFER
Nursing Transfer Note      2/17/2020     Transfer To: CSU    Transfer via bed    Transfer with cardiac monitoring, LVAD    Transported by RN     Medicines sent: DBT gtts, heparin gtts, lasix gtts    Chart send with patient: Yes    Notified: mother at bedside    Upon arrival to floor: cardiac monitor applied, patient oriented to room, call bell in reach and bed in lowest position

## 2020-02-17 NOTE — PLAN OF CARE
Problem: Physical Therapy Goal  Goal: Physical Therapy Goal  Description  Goals to be met by: 3/9/2020     Patient will increase functional independence with mobility by performin. Supine to sit with CGA- not met  2. Sit to stand transfer with Supervision- not met  3. Gait  x 150 feet with Supervision - not met          Outcome: Ongoing, Progressing     Pt is progressing toward goals. All goals remain appropriate.    Maren Barnes, PT, DPT  2020  [phone]

## 2020-02-17 NOTE — PT/OT/SLP PROGRESS
"Speech Language Pathology Treatment    Patient Name:  Saba Lott   MRN:  [id number]  Admitting Diagnosis: Presence of left ventricular assist device (LVAD)    Recommendations:                 General Recommendations:  Dysphagia therapy  Diet recommendations:  Dental Soft, Liquid Diet Level: Thin   Aspiration Precautions: 1 bite/sip at a time, Eliminate distractions, Frequent oral care, HOB to 90 degrees, Meds whole 1 at a time, No straws, Small bites/sips and Standard aspiration precautions   General Precautions: Standard, fall, LVAD, sternal  Communication strategies:  none    Subjective     SLP reviewed Pt with RN, RN reported Pt tolerating pills whole  Pt presents calm  He explains, "I ate all my breakfast"    Pain/Comfort:  · Pain Rating 1: 0/10    Objective:     Has the patient been evaluated by SLP for swallowing?   Yes  Keep patient NPO? No   Current Respiratory Status: nasal cannula    2/17/2020: Satisfactory postoperative chest radiograph.    Pt seen for ongoing swallow assessment. He was found awake and alert in bed with RN and his mother in room upon SLP entrance.  HOB elevated.  Pt with unproductive cough upon elevation of the bed.  He demonstrated a mildly hoarse vocal quality with adequate intensity.  Pt seen with whole medications presented from RN with cup edge sips thin liquids. No overt S/S aspiration with medications accepted whole with thin liquids. Pt seen with subsequent cup edge sips thin liquids (single sips x3, continuous sip x1) self-fed. He politely declined additional trials of advanceed textures (solids.) No overt S/S aspiration with thin liquids accepted. Pt and mother were educated on diet recommendations, safe swallow strategies and SLP POC. No questions noted. Whiteboard updated.     Assessment:     Saba Lott is a 54 y.o. male with an SLP diagnosis of resolving dysphonia s/p extubation. .  ST to follow up to monitor tolerance of thin liquids.     Goals:   Multidisciplinary Problems "     SLP Goals        Problem: SLP Goal    Goal Priority Disciplines Outcome   SLP Goal     SLP Ongoing, Progressing   Description:  Speech Language Pathology Goals  Goals expected to be met by 2/18/2020  1. Pt will participate in ongoing swallow assessment to determine safest, least restrictive diet  2. Educate Pt and family on S/S aspiration and aspiration precautions                 Multidisciplinary Problems (Resolved)        Problem: SLP Goal    Goal Priority Disciplines Outcome   SLP Goal   (Resolved)     SLP Met                   Plan:     · Patient to be seen:  5 x/week   · Plan of Care expires:  03/12/20  · Plan of Care reviewed with:  patient   · SLP Follow-Up:  Yes       Discharge recommendations:  (pending PT/OT recs, no additional ST needs anticipated following d/c from acute )       Time Tracking:     SLP Treatment Date:   02/17/20  Speech Start Time:  1020  Speech Stop Time:  1030     Speech Total Time (min):  10 min    Billable Minutes: Treatment Swallowing Dysfunction 10    ANNA Doyle, [facility]-SLP  Speech-Language Pathology  Pager: [phone]      02/17/2020

## 2020-02-17 NOTE — ASSESSMENT & PLAN NOTE
-HeartMate 3 Implanted 02/06/2020 as DT. Chest closed 2/7.   -CTS Primary.  -Implanted by Dr. Joshi.  -INR sub therapeutic Coumadin, Goal INR 2.0-3.0. Anticoag per CTS.   -Antiplatelets  mg.  -LDH is stable overall today. Will continue to monitor daily.  -Continues on  5mcg/kg/mn and lasix 15mg/hr. Also getting diuril 500mg daily.   -Speed set at 5300.  -Not listed for OHTx.    Procedure: Device Interrogation Including analysis of device parameters.  Current Settings: Ventricular Assist Device.  Review of device function is stable/unstable stable.    TXP LVAD INTERROGATIONS 2/17/2020 2/17/2020 2/17/2020 2/17/2020 2/17/2020 2/17/2020 2/16/2020   Type - - - - - - -   Flow 4.6 4.6 4.6 4.7 4.6 4.7 4.7   Speed 5300 5350 5300 5300 5300 5300 5300   PI 3.2 3.3 3.4 3.2 3.3 3.2 3.2   Power (Centeno) 3.9 4.0 4.0 4.0 3.9 3.9 3.9   LSL 4900 4900 4900 4900 4900 4900 4900   Pulsatility Intermittent pulse Intermittent pulse Intermittent pulse Intermittent pulse Intermittent pulse Intermittent pulse Intermittent pulse

## 2020-02-17 NOTE — PROGRESS NOTES
[facility]  Critical Care - Surgery  Progress Note    Patient Name: Saba Lott  MRN: [id number]  Admission Date: 1/15/2020  Hospital Length of Stay: 33 days  Code Status: Prior  Attending Provider: David Joshi MD  Primary Care Provider: Primary Doctor No   Principal Problem: Presence of left ventricular assist device (LVAD)    Subjective:     Hospital/ICU Course:  No notes on file    Interval History/Significant Events: NAEON. Patient has step down orders. Waiting for a bed.     -lasix down to 10  -coumadin 7.5  -daily LD  -step down      Follow-up For: Procedure(s) (LRB):  CLOSURE, WOUND, STERNUM (N/A)  WASHOUT  INSERTION, GRAFT, PERICARDIUM  APPLICATION, WOUND VAC    Objective:     Vital Signs (Most Recent):  Temp: 98.1 °F (36.7 °C) (02/17/20 0700)  Pulse: (!) 114 (02/17/20 0921)  Resp: (!) 28 (02/17/20 0921)  BP: (!) 82/0 (02/17/20 0715)  SpO2: 98 % (02/17/20 0921) Vital Signs (24h Range):  Temp:  [98 °F (36.7 °C)-99.2 °F (37.3 °C)] 98.1 °F (36.7 °C)  Pulse:  [] 114  Resp:  [18-29] 28  SpO2:  [98 %-100 %] 98 %  BP: ()/(0-67) 82/0  Arterial Line BP: ()/(70-79) 97/79     Weight: 89 kg (196 lb 3.4 oz)  Body mass index is 30.73 kg/m².      Intake/Output Summary (Last 24 hours) at 2/17/2020 1007  Last data filed at 2/17/2020 0700  Gross per 24 hour   Intake 1518.8 ml   Output 3134 ml   Net -1615.2 ml       Physical Exam   Constitutional: He is oriented to person, place, and time. He appears well-developed and well-nourished.   HENT:   Head: Normocephalic and atraumatic.   Eyes: Pupils are equal, round, and reactive to light. EOM are normal.   Neck: Normal range of motion. Neck supple.   Cardiovascular: Normal rate and regular rhythm.   LVAD in place  Introducer in place   Pulmonary/Chest: Effort normal. No respiratory distress.   Satting well on RA   Abdominal: Soft.   Neurological: He is alert and oriented to person, place, and time.   Skin: Skin is warm and dry.   Psychiatric:  He has a normal mood and affect. His behavior is normal.   Nursing note and vitals reviewed.      Vents:  n/a    Lines/Drains/Airways     Peripherally Inserted Central Catheter Line                 PICC Triple Lumen 02/08/20 1530 right basilic 8 days          Central Venous Catheter Line                 Percutaneous Central Line Insertion/Assessment - Quad lumen  02/06/20 0742 11 days          Drain                 Urethral Catheter 02/12/20 1620 4 days          Line                 VAD 02/06/20 1047 Left ventricular assist device HeartMate 3 10 days                Significant Labs:    CBC/Anemia Profile:  Recent Labs   Lab 02/16/20  0300 02/17/20  0347   WBC 19.00* 21.32*   HGB 7.3* 7.0*   HCT 23.8* 23.9*    397*   MCV 83 84   RDW 19.2* 19.3*        Chemistries:  Recent Labs   Lab 02/16/20  0300  02/16/20  1620 02/16/20  1725 02/16/20  2205 02/17/20  0347     141   < > 140  --  136 135*  135*   K 3.9  3.9  3.9   < > 3.0* 4.0 4.9 4.7  4.7   CL 97  97   < > 104  --  95 93*  93*   CO2 32*  32*   < > 27  --  32* 33*  33*   BUN 37*  37*   < > 28*  --  30* 30*  30*   CREATININE 1.2  1.2   < > 0.8  --  1.2 1.1  1.1   CALCIUM 9.0  9.0   < > 7.5*  --  8.7 9.0  9.0   ALBUMIN 2.4*  --   --   --   --  2.4*  2.4*   PROT 7.7  --   --   --   --  7.9  7.9   BILITOT 0.7  --   --   --   --  0.6  0.6   ALKPHOS 117  --   --   --   --  118  118   ALT 23  --   --   --   --  26  26   AST 25  --   --   --   --  30  30   MG 2.1   < > 1.6  --  2.4 2.3   PHOS 1.9*  --   --   --   --  2.7    < > = values in this interval not displayed.       ABGs:   No results for input(s): PH, PCO2, HCO3, POCSATURATED, BE in the last 48 hours.  Coagulation:   Recent Labs   Lab 02/17/20  0347 02/17/20  0613   INR 1.5*  --    APTT  --  84.0*     All pertinent labs within the past 24 hours have been reviewed.    Significant Imaging:  I have reviewed and interpreted all pertinent imaging results/findings within the past 24  hours.     Assessment/Plan:     * Presence of left ventricular assist device (LVAD)  Neuro:   - Pain control: scheduled tylenol. Pain controlled without opioids  - No sedation.      Pulmonary:   - Extubated; satting well on RA  - Radha - weaned off  - CPT q6hrs  - Daily CXR pending     Cardiac:  - MAP goal >65  - Epi, cardene: OFF  -  at 5  - amlodipine 10 qd  - LVAD speed 5300 and flow 4.9  - daily LDH up to 558 from 381  - PICC in place  - Lasix gtt at 10  - diuril 250 daily     Renal:   - monitor UOP  - lasix gtt at 10  - BUN/ Cr stable at 30/1.1 (from 37/1.2)     Fluids/Electrolytes/Nutrition/GI:   -Nutritional status: mechanical soft diet  -discontinue boost supplements  - bar Mg, K  - 250 free water flushes to q8hrs  - bowel regimen: Miralax and docusate      Hematology/Oncology:  -H/H stable  7.0/23.9 (from 7.3/23.8)  -INR/Plts 1.5/397  -Trend CBC  -coumadin 7.5 at 5 pm   -hep @ 1300u/hr with PTT goal 45 - 54  -  mg qd     Infectious Disease:   -Afebrile  -Leukocytosis WBC 21.32 from 19, stable  -Abx: completed course of vanc and ancef     Endocrine:  -Glucose goal of 140-180  -Insulin gtt per endo     Dispo:  -Stepdown to CTSU today.       Critical care was time spent personally by me on the following activities: development of treatment plan with patient or surrogate and bedside caregivers, discussions with consultants, evaluation of patient's response to treatment, examination of patient, ordering and performing treatments and interventions, ordering and review of laboratory studies, ordering and review of radiographic studies, pulse oximetry, re-evaluation of patient's condition.  This critical care time did not overlap with that of any other provider or involve time for any procedures.     Peggy Sanders MD  Critical Care - Surgery  [facility]

## 2020-02-17 NOTE — PLAN OF CARE
Plan of care reviewed with pt and family. Pt follows commands and answers questions appropriately. Pt on continuous heparin drip, dobutamine drip, and lasix drip. Pt on room air, sats> 98% throughout shift. Pt's urine output is 170-110 cc/hr. LVAD power, speed, and indexes WDL. Vital signs stable throughout shift. Pt did not have any bowel movements. See flow sheets for additional details.

## 2020-02-17 NOTE — PROGRESS NOTES
[facility]  Heart Transplant  Progress Note    Patient Name: Saba Lott  MRN: [id number]  Admission Date: 1/15/2020  Hospital Length of Stay: 33 days  Attending Physician: David Joshi MD  Primary Care Provider: Primary Doctor No  Principal Problem:Presence of left ventricular assist device (LVAD)    Subjective:     Interval History: No complaints this am. Feeling well today.    Continuous Infusions:   DOBUTamine 5 mcg/kg/min (02/17/20 0600)    furosemide (LASIX) 2 mg/mL continuous infusion (non-titrating) 15 mg/hr (02/17/20 0600)    heparin (porcine) in 5 % dex 1,300 Units/hr (02/17/20 0600)    nicardipine 2.5 mg/hr (02/11/20 0600)     Scheduled Meds:   acetaminophen  650 mg Oral Q6H    amLODIPine  10 mg Oral Daily    aspirin  325 mg Per NG tube Daily    chlorothiazide (DIURIL) IVPB  250 mg Intravenous Q24H    chlorothiazide (DIURIL) IVPB  500 mg Intravenous Once    docusate sodium  100 mg Oral BID    ferrous gluconate  324 mg Oral Daily with breakfast    hydrALAZINE  25 mg Oral Q8H    magnesium oxide  800 mg Oral TID    pantoprazole  40 mg Oral Daily    polyethylene glycol  17 g Oral Daily    potassium chloride 10%  20 mEq Per NG tube BID    potassium, sodium phosphates  1 packet Oral QID (WM & HS)    sodium chloride 0.9%  10 mL Intravenous Q6H     PRN Meds:albumin human 5%, albuterol sulfate, bisacodyL, Dextrose 10% Bolus, Dextrose 10% Bolus, Dextrose 10% Bolus, Dextrose 10% Bolus, Dextrose 10% Bolus, Dextrose 10% Bolus, diphenhydrAMINE, glucagon (human recombinant), glucose, glucose, hydrALAZINE, insulin aspart U-100, magnesium hydroxide 400 mg/5 ml, magnesium sulfate IVPB, oxyCODONE, oxyCODONE, potassium chloride 10%, potassium chloride 10%, potassium chloride 10%, potassium chloride 10%, potassium, sodium phosphates, potassium, sodium phosphates, potassium, sodium phosphates, sodium chloride 0.9%, Flushing PICC Protocol **AND** sodium chloride 0.9% **AND** sodium chloride  0.9%    Review of patient's allergies indicates:   Allergen Reactions    Iodine and iodide containing products      Objective:     Vital Signs (Most Recent):  Temp: 98.1 °F (36.7 °C) (02/17/20 0700)  Pulse: 106 (02/17/20 0700)  Resp: (!) 22 (02/17/20 0300)  BP: (!) 82/0 (02/17/20 0715)  SpO2: 98 % (02/17/20 0700) Vital Signs (24h Range):  Temp:  [98 °F (36.7 °C)-99.2 °F (37.3 °C)] 98.1 °F (36.7 °C)  Pulse:  [] 106  Resp:  [18-29] 22  SpO2:  [97 %-100 %] 98 %  BP: ()/(0-67) 82/0  Arterial Line BP: ()/(64-79) 97/79     Patient Vitals for the past 72 hrs (Last 3 readings):   Weight   02/17/20 0713 89 kg (196 lb 3.4 oz)   02/15/20 0300 76 kg (167 lb 8.8 oz)     Body mass index is 30.73 kg/m².      Intake/Output Summary (Last 24 hours) at 2/17/2020 0759  Last data filed at 2/17/2020 0600  Gross per 24 hour   Intake 1664.5 ml   Output 3329 ml   Net -1664.5 ml     Physical Exam   Constitutional: He is oriented to person, place, and time. He appears well-developed and well-nourished.   HENT:   Head: Normocephalic and atraumatic.   Cardiovascular: Normal rate and regular rhythm.   VAD hum.    Pulmonary/Chest: Effort normal. No respiratory distress.   Abdominal: Soft.   Genitourinary:   Genitourinary Comments: Mc cath in place   Neurological: He is alert and oriented to person, place, and time.   Skin: Skin is warm and dry.   Psychiatric: He has a normal mood and affect. His behavior is normal.   Nursing note and vitals reviewed.    Significant Labs:  CBC:  Recent Labs   Lab 02/15/20  0400 02/16/20  0300 02/17/20  0347   WBC 18.67* 19.00* 21.32*   RBC 2.97* 2.87* 2.84*   HGB 7.5* 7.3* 7.0*   HCT 24.6* 23.8* 23.9*    348 397*   MCV 83 83 84   MCH 25.3* 25.4* 24.6*   MCHC 30.5* 30.7* 29.3*     BNP:  Recent Labs   Lab 02/12/20  0400 02/14/20  0400 02/17/20  0347   BNP 1,356* 808* 637*     CMP:  Recent Labs   Lab 02/15/20  0400  02/16/20  0300  02/16/20  1620 02/16/20  1725 02/16/20  2205  02/17/20 0347   *  162*   < > 156*  156*   < > 112*  --  138* 166*  166*   CALCIUM 9.1  9.1   < > 9.0  9.0   < > 7.5*  --  8.7 9.0  9.0   ALBUMIN 2.6*  --  2.4*  --   --   --   --  2.4*  2.4*   PROT 7.8  --  7.7  --   --   --   --  7.9  7.9   *  135*   < > 141  141   < > 140  --  136 135*  135*   K 4.4  4.4  4.4  4.4   < > 3.9  3.9  3.9   < > 3.0* 4.0 4.9 4.7  4.7   CO2 34*  34*   < > 32*  32*   < > 27  --  32* 33*  33*   CL 93*  93*   < > 97  97   < > 104  --  95 93*  93*   BUN 38*  38*   < > 37*  37*   < > 28*  --  30* 30*  30*   CREATININE 1.2  1.2   < > 1.2  1.2   < > 0.8  --  1.2 1.1  1.1   ALKPHOS 125  --  117  --   --   --   --  118  118   ALT 29  --  23  --   --   --   --  26  26   AST 29  --  25  --   --   --   --  30  30   BILITOT 0.7  --  0.7  --   --   --   --  0.6  0.6    < > = values in this interval not displayed.      Coagulation:   Recent Labs   Lab 02/15/20  0400  02/16/20  0300  02/16/20  1725 02/16/20 2249 02/17/20 0347 02/17/20  0613   INR 1.2  --  1.2  --   --   --  1.5*  --    APTT 52.9*  52.9*  52.9*   < > 40.7*  40.7*  40.7*   < > 29.2 42.6*  --  84.0*    < > = values in this interval not displayed.     LDH:  Recent Labs   Lab 02/15/20  0400 02/16/20 0300 02/17/20 0347   * 381* 558*     Microbiology:  Microbiology Results (last 7 days)     ** No results found for the last 168 hours. **        I have reviewed all pertinent labs within the past 24 hours.    Estimated Creatinine Clearance: 80.8 mL/min (based on SCr of 1.1 mg/dL).    Diagnostic Results:  I have reviewed and interpreted all pertinent imaging results/findings within the past 24 hours.    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 Chronic MARC - Class II-III and mild LE edema.       Hospital Course (synopsis of major diagnoses, care, treatment, and services provided during the course of the hospital stay):  -2/12 admit to CDU for diuresis with IV lasix  -IV abx   -CXR with suspected small left pleural effusion with associated left basilar atelectasis versus evolving airspace disease  -2/13 single chamber ICD implant; IV lasix decreased to BID  -2/16 add dobutamine, hold BB due to hypotension, no ACE-I or ARB due to recent HPI hypotension  -2/17 Lasix changed to PO  -2/18 dobutamine discontinued    Admitted to [facility] on Thursday due to severe SOB for a week with associated orthopnea, MARC, and PND unable to lie flat and found to be in acute diastolic heart failure. States he normally weighs around 219 but up to 254lb on admission tonight. Says he hasn't been the most compliant in terms of fluid restriction and daily weights but has avoided salt. BNP on admission was 2375. BUN/CRT 32/1.4 He was started on IV diuretics but continued to deteriorate. Creatinine continued to increase and reached 4.3 with oliguria. He was started on CRRT for a few days and tolerated well. Renal u/s was negative for obstruction and liver u/s without findings of cirrhosis. All of this was progression of cardiorenal syndrome with liver congestion from severe volume overload. On arrival vitals stable and in no acute distress. He has obvious anasarca up to the chest. He did have uop of 500 at time of arrival also.    TTE 5/28/19  · Moderate left ventricular enlargement.  · Severely decreased left ventricular systolic function. The estimated ejection fraction is 20%  · Global hypokinetic wall motion.  · Grade III (severe) left ventricular diastolic dysfunction consistent with restrictive physiology.  · Severe left atrial enlargement.  · Moderate right ventricular enlargement.  · Low normal right ventricular systolic function.  · Mild right atrial enlargement.  · Moderate mitral regurgitation.  Mild to  moderate tricuspid regurgitation    * Presence of left ventricular assist device (LVAD)  -HeartMate 3 Implanted 02/06/2020 as DT. Chest closed 2/7.   -CTS Primary.  -Implanted by Dr. Joshi.  -INR sub therapeutic Coumadin, Goal INR 2.0-3.0. Anticoag per CTS.   -Antiplatelets  mg.  -LDH is stable overall today. Will continue to monitor daily.  -Continues on  5mcg/kg/mn and lasix 15mg/hr. Also getting diuril 500mg daily.   -Speed set at 5300.  -Not listed for OHTx.    Procedure: Device Interrogation Including analysis of device parameters.  Current Settings: Ventricular Assist Device.  Review of device function is stable/unstable stable.    TXP LVAD INTERROGATIONS 2/17/2020 2/17/2020 2/17/2020 2/17/2020 2/17/2020 2/17/2020 2/16/2020   Type - - - - - - -   Flow 4.6 4.6 4.6 4.7 4.6 4.7 4.7   Speed 5300 5350 5300 5300 5300 5300 5300   PI 3.2 3.3 3.4 3.2 3.3 3.2 3.2   Power (Centeno) 3.9 4.0 4.0 4.0 3.9 3.9 3.9   LSL 4900 4900 4900 4900 4900 4900 4900   Pulsatility Intermittent pulse Intermittent pulse Intermittent pulse Intermittent pulse Intermittent pulse Intermittent pulse Intermittent pulse       ANJELICA (acute kidney injury)  -Creatinine was 3.0 on admit and got as high as 4.3 at OSH prior to transfer. Renal function was normal 8 months ago.  -Trending down today.     Leukocytosis  -Afebrile.   -Would remove Mc.     Acute hyperglycemia  -HgA1C 6.6  -Endocrine following    Moderate malnutrition  - Boost glucose control added 1/27   - Prealbumin is 23    Morbid obesity  -Weight down considerably since admit with diuresis.    Cardiogenic shock  -NICM; Likely Etoh induced  -Transferred from [facility] with IABP at 1:1 for further level of care. IABP removed 1/25 and replaced 2/3.   -S/P HMIII on 2/3.     Deep vein thrombosis (DVT) of right lower extremity  -Unsure of timing but was on eliquis PTA.   -Will anticoag with heparin/warfarin post VAD.     AICD (automatic cardioverter/defibrillator)  present  -Medtronic single chamber ICD placed 2019 for primary prevention      Mickey Rider NP  Heart Transplant  [facility]

## 2020-02-18 LAB
ANION GAP SERPL CALC-SCNC: 8 MMOL/L (ref 8–16)
ANION GAP SERPL CALC-SCNC: 9 MMOL/L (ref 8–16)
APTT BLDCRRT: 35.2 SEC (ref 21–32)
APTT BLDCRRT: 55.3 SEC (ref 21–32)
APTT BLDCRRT: 60.2 SEC (ref 21–32)
BASOPHILS # BLD AUTO: 0.05 K/UL (ref 0–0.2)
BASOPHILS NFR BLD: 0.3 % (ref 0–1.9)
BUN SERPL-MCNC: 27 MG/DL (ref 6–20)
BUN SERPL-MCNC: 30 MG/DL (ref 6–20)
CALCIUM SERPL-MCNC: 8.8 MG/DL (ref 8.7–10.5)
CALCIUM SERPL-MCNC: 9 MG/DL (ref 8.7–10.5)
CHLORIDE SERPL-SCNC: 89 MMOL/L (ref 95–110)
CHLORIDE SERPL-SCNC: 91 MMOL/L (ref 95–110)
CO2 SERPL-SCNC: 32 MMOL/L (ref 23–29)
CO2 SERPL-SCNC: 33 MMOL/L (ref 23–29)
CREAT SERPL-MCNC: 1 MG/DL (ref 0.5–1.4)
CREAT SERPL-MCNC: 1.2 MG/DL (ref 0.5–1.4)
DIFFERENTIAL METHOD: ABNORMAL
EOSINOPHIL # BLD AUTO: 0.5 K/UL (ref 0–0.5)
EOSINOPHIL NFR BLD: 2.3 % (ref 0–8)
ERYTHROCYTE [DISTWIDTH] IN BLOOD BY AUTOMATED COUNT: 19.2 % (ref 11.5–14.5)
EST. GFR  (AFRICAN AMERICAN): >60 ML/MIN/1.73 M^2
EST. GFR  (AFRICAN AMERICAN): >60 ML/MIN/1.73 M^2
EST. GFR  (NON AFRICAN AMERICAN): >60 ML/MIN/1.73 M^2
EST. GFR  (NON AFRICAN AMERICAN): >60 ML/MIN/1.73 M^2
GLUCOSE SERPL-MCNC: 150 MG/DL (ref 70–110)
GLUCOSE SERPL-MCNC: 163 MG/DL (ref 70–110)
HCT VFR BLD AUTO: 21.3 % (ref 40–54)
HGB BLD-MCNC: 6.8 G/DL (ref 14–18)
IMM GRANULOCYTES # BLD AUTO: 0.84 K/UL (ref 0–0.04)
IMM GRANULOCYTES NFR BLD AUTO: 4.2 % (ref 0–0.5)
INR PPP: 1.5 (ref 0.8–1.2)
LDH SERPL L TO P-CCNC: 407 U/L (ref 110–260)
LYMPHOCYTES # BLD AUTO: 2.4 K/UL (ref 1–4.8)
LYMPHOCYTES NFR BLD: 11.8 % (ref 18–48)
MAGNESIUM SERPL-MCNC: 2 MG/DL (ref 1.6–2.6)
MAGNESIUM SERPL-MCNC: 2 MG/DL (ref 1.6–2.6)
MCH RBC QN AUTO: 26 PG (ref 27–31)
MCHC RBC AUTO-ENTMCNC: 31.9 G/DL (ref 32–36)
MCV RBC AUTO: 81 FL (ref 82–98)
MONOCYTES # BLD AUTO: 1 K/UL (ref 0.3–1)
MONOCYTES NFR BLD: 5 % (ref 4–15)
NEUTROPHILS # BLD AUTO: 15.2 K/UL (ref 1.8–7.7)
NEUTROPHILS NFR BLD: 76.4 % (ref 38–73)
NRBC BLD-RTO: 1 /100 WBC
PHOSPHATE SERPL-MCNC: 4.6 MG/DL (ref 2.7–4.5)
PLATELET # BLD AUTO: 397 K/UL (ref 150–350)
PMV BLD AUTO: 9 FL (ref 9.2–12.9)
POCT GLUCOSE: 106 MG/DL (ref 70–110)
POCT GLUCOSE: 109 MG/DL (ref 70–110)
POCT GLUCOSE: 110 MG/DL (ref 70–110)
POCT GLUCOSE: 116 MG/DL (ref 70–110)
POTASSIUM SERPL-SCNC: 3.8 MMOL/L (ref 3.5–5.1)
POTASSIUM SERPL-SCNC: 3.9 MMOL/L (ref 3.5–5.1)
PROTHROMBIN TIME: 15 SEC (ref 9–12.5)
RBC # BLD AUTO: 2.62 M/UL (ref 4.6–6.2)
SODIUM SERPL-SCNC: 130 MMOL/L (ref 136–145)
SODIUM SERPL-SCNC: 132 MMOL/L (ref 136–145)
WBC # BLD AUTO: 19.92 K/UL (ref 3.9–12.7)

## 2020-02-18 PROCEDURE — 99900035 HC TECH TIME PER 15 MIN (STAT)

## 2020-02-18 PROCEDURE — 85025 COMPLETE CBC W/AUTO DIFF WBC: CPT

## 2020-02-18 PROCEDURE — 85730 THROMBOPLASTIN TIME PARTIAL: CPT | Mod: 91

## 2020-02-18 PROCEDURE — 97803 MED NUTRITION INDIV SUBSEQ: CPT

## 2020-02-18 PROCEDURE — 20600001 HC STEP DOWN PRIVATE ROOM

## 2020-02-18 PROCEDURE — A4216 STERILE WATER/SALINE, 10 ML: HCPCS | Performed by: STUDENT IN AN ORGANIZED HEALTH CARE EDUCATION/TRAINING PROGRAM

## 2020-02-18 PROCEDURE — 63600175 PHARM REV CODE 636 W HCPCS: Performed by: HOSPITALIST

## 2020-02-18 PROCEDURE — 83615 LACTATE (LD) (LDH) ENZYME: CPT

## 2020-02-18 PROCEDURE — 99232 PR SUBSEQUENT HOSPITAL CARE,LEVL II: ICD-10-PCS | Mod: ,,, | Performed by: INTERNAL MEDICINE

## 2020-02-18 PROCEDURE — 99232 SBSQ HOSP IP/OBS MODERATE 35: CPT | Mod: ,,, | Performed by: INTERNAL MEDICINE

## 2020-02-18 PROCEDURE — 97535 SELF CARE MNGMENT TRAINING: CPT

## 2020-02-18 PROCEDURE — 25000003 PHARM REV CODE 250: Performed by: NURSE PRACTITIONER

## 2020-02-18 PROCEDURE — 25000003 PHARM REV CODE 250: Performed by: STUDENT IN AN ORGANIZED HEALTH CARE EDUCATION/TRAINING PROGRAM

## 2020-02-18 PROCEDURE — 63600175 PHARM REV CODE 636 W HCPCS: Performed by: STUDENT IN AN ORGANIZED HEALTH CARE EDUCATION/TRAINING PROGRAM

## 2020-02-18 PROCEDURE — 85730 THROMBOPLASTIN TIME PARTIAL: CPT

## 2020-02-18 PROCEDURE — 97530 THERAPEUTIC ACTIVITIES: CPT

## 2020-02-18 PROCEDURE — 63600175 PHARM REV CODE 636 W HCPCS: Performed by: NURSE PRACTITIONER

## 2020-02-18 PROCEDURE — 85610 PROTHROMBIN TIME: CPT

## 2020-02-18 PROCEDURE — 25000003 PHARM REV CODE 250: Performed by: INTERNAL MEDICINE

## 2020-02-18 PROCEDURE — 93750 PR INTERROGATE VENT ASSIST DEV, IN PERSON, W PHYSICIAN ANALYSIS: ICD-10-PCS | Mod: ,,, | Performed by: INTERNAL MEDICINE

## 2020-02-18 PROCEDURE — 84100 ASSAY OF PHOSPHORUS: CPT

## 2020-02-18 PROCEDURE — 94761 N-INVAS EAR/PLS OXIMETRY MLT: CPT

## 2020-02-18 PROCEDURE — 93750 INTERROGATION VAD IN PERSON: CPT | Mod: ,,, | Performed by: INTERNAL MEDICINE

## 2020-02-18 PROCEDURE — 83735 ASSAY OF MAGNESIUM: CPT

## 2020-02-18 PROCEDURE — 80048 BASIC METABOLIC PNL TOTAL CA: CPT

## 2020-02-18 PROCEDURE — 97116 GAIT TRAINING THERAPY: CPT

## 2020-02-18 PROCEDURE — 92526 ORAL FUNCTION THERAPY: CPT

## 2020-02-18 PROCEDURE — 94668 MNPJ CHEST WALL SBSQ: CPT

## 2020-02-18 PROCEDURE — 27000248 HC VAD-ADDITIONAL DAY

## 2020-02-18 RX ORDER — POTASSIUM CHLORIDE 20 MEQ/1
40 TABLET, EXTENDED RELEASE ORAL 2 TIMES DAILY
Status: DISCONTINUED | OUTPATIENT
Start: 2020-02-18 | End: 2020-02-24

## 2020-02-18 RX ORDER — WARFARIN SODIUM 5 MG/1
10 TABLET ORAL DAILY
Status: DISCONTINUED | OUTPATIENT
Start: 2020-02-18 | End: 2020-02-19

## 2020-02-18 RX ORDER — HEPARIN SODIUM 10000 [USP'U]/100ML
1000 INJECTION, SOLUTION INTRAVENOUS CONTINUOUS
Status: DISCONTINUED | OUTPATIENT
Start: 2020-02-18 | End: 2020-02-18

## 2020-02-18 RX ORDER — HEPARIN SODIUM 10000 [USP'U]/100ML
900 INJECTION, SOLUTION INTRAVENOUS CONTINUOUS
Status: DISCONTINUED | OUTPATIENT
Start: 2020-02-18 | End: 2020-02-18

## 2020-02-18 RX ORDER — HEPARIN SODIUM,PORCINE/D5W 25000/250
12 INTRAVENOUS SOLUTION INTRAVENOUS CONTINUOUS
Status: DISCONTINUED | OUTPATIENT
Start: 2020-02-18 | End: 2020-02-18

## 2020-02-18 RX ORDER — WARFARIN 7.5 MG/1
7.5 TABLET ORAL ONCE
Status: DISCONTINUED | OUTPATIENT
Start: 2020-02-18 | End: 2020-02-18

## 2020-02-18 RX ORDER — HEPARIN SODIUM,PORCINE/D5W 25000/250
12 INTRAVENOUS SOLUTION INTRAVENOUS CONTINUOUS
Status: DISCONTINUED | OUTPATIENT
Start: 2020-02-18 | End: 2020-02-20

## 2020-02-18 RX ORDER — ACETAMINOPHEN 325 MG/1
650 TABLET ORAL EVERY 6 HOURS PRN
Status: DISCONTINUED | OUTPATIENT
Start: 2020-02-18 | End: 2020-02-27 | Stop reason: HOSPADM

## 2020-02-18 RX ADMIN — Medication 800 MG: at 04:02

## 2020-02-18 RX ADMIN — CHLOROTHIAZIDE SODIUM 250 MG: 500 INJECTION, POWDER, LYOPHILIZED, FOR SOLUTION INTRAVENOUS at 09:02

## 2020-02-18 RX ADMIN — HYDRALAZINE HYDROCHLORIDE 25 MG: 25 TABLET, FILM COATED ORAL at 01:02

## 2020-02-18 RX ADMIN — Medication 800 MG: at 08:02

## 2020-02-18 RX ADMIN — Medication 10 ML: at 08:02

## 2020-02-18 RX ADMIN — HYDRALAZINE HYDROCHLORIDE 25 MG: 25 TABLET, FILM COATED ORAL at 08:02

## 2020-02-18 RX ADMIN — ACETAMINOPHEN 650 MG: 325 TABLET ORAL at 06:02

## 2020-02-18 RX ADMIN — Medication 10 ML: at 06:02

## 2020-02-18 RX ADMIN — FERROUS GLUCONATE TAB 324 MG (37.5 MG ELEMENTAL IRON) 324 MG: 324 (37.5 FE) TAB at 08:02

## 2020-02-18 RX ADMIN — Medication 10 ML: at 01:02

## 2020-02-18 RX ADMIN — DOBUTAMINE IN DEXTROSE 2.5 MCG/KG/MIN: 200 INJECTION, SOLUTION INTRAVENOUS at 12:02

## 2020-02-18 RX ADMIN — WARFARIN SODIUM 10 MG: 5 TABLET ORAL at 04:02

## 2020-02-18 RX ADMIN — HEPARIN SODIUM AND DEXTROSE 900 UNITS/HR: 10000; 5 INJECTION INTRAVENOUS at 09:02

## 2020-02-18 RX ADMIN — POTASSIUM CHLORIDE 40 MEQ: 1500 TABLET, EXTENDED RELEASE ORAL at 08:02

## 2020-02-18 RX ADMIN — Medication 10 ML: at 12:02

## 2020-02-18 RX ADMIN — DOCUSATE SODIUM 100 MG: 100 CAPSULE, LIQUID FILLED ORAL at 08:02

## 2020-02-18 RX ADMIN — ASPIRIN 325 MG ORAL TABLET 325 MG: 325 PILL ORAL at 08:02

## 2020-02-18 RX ADMIN — HEPARIN SODIUM AND DEXTROSE 12 UNITS/KG/HR: 10000; 5 INJECTION INTRAVENOUS at 07:02

## 2020-02-18 RX ADMIN — POLYETHYLENE GLYCOL 3350 17 G: 17 POWDER, FOR SOLUTION ORAL at 08:02

## 2020-02-18 RX ADMIN — AMLODIPINE BESYLATE 10 MG: 10 TABLET ORAL at 08:02

## 2020-02-18 RX ADMIN — HYDRALAZINE HYDROCHLORIDE 25 MG: 25 TABLET, FILM COATED ORAL at 06:02

## 2020-02-18 RX ADMIN — POTASSIUM CHLORIDE 40 MEQ: 1500 TABLET, EXTENDED RELEASE ORAL at 09:02

## 2020-02-18 RX ADMIN — PANTOPRAZOLE SODIUM 40 MG: 40 TABLET, DELAYED RELEASE ORAL at 08:02

## 2020-02-18 NOTE — PROGRESS NOTES
MD Joshi instructed RN to keep barton in place, as patient is less than 48 hours from when barton was placed for urinary retention.

## 2020-02-18 NOTE — SUBJECTIVE & OBJECTIVE
Subjective:     Post-Op Info:  Procedure(s) (LRB):  CLOSURE, WOUND, STERNUM (N/A)  WASHOUT  INSERTION, GRAFT, PERICARDIUM  APPLICATION, WOUND VAC   11 Days Post-Op      Reason for Visit:  Patient is seen in follow up management of HM 3    Interval History: NAEON. Pt has no complaints. Transfer to [facility] today     Medications:  Continuous Infusions:   DOBUTamine 4 mcg/kg/min (02/18/20 0813)    furosemide (LASIX) 2 mg/mL continuous infusion (non-titrating) 10 mg/hr (02/17/20 1700)    heparin (porcine) in 5 % dex       Scheduled Meds:   amLODIPine  10 mg Oral Daily    aspirin  325 mg Per NG tube Daily    chlorothiazide (DIURIL) IVPB  250 mg Intravenous Q24H    docusate sodium  100 mg Oral BID    ferrous gluconate  324 mg Oral Daily with breakfast    hydrALAZINE  25 mg Oral Q8H    magnesium oxide  800 mg Oral TID    pantoprazole  40 mg Oral Daily    polyethylene glycol  17 g Oral Daily    potassium chloride  40 mEq Oral BID    sodium chloride 0.9%  10 mL Intravenous Q6H    warfarin  7.5 mg Oral Once     PRN Meds:acetaminophen, albuterol sulfate, benzonatate, bisacodyL, Dextrose 10% Bolus, Dextrose 10% Bolus, Dextrose 10% Bolus, Dextrose 10% Bolus, Dextrose 10% Bolus, Dextrose 10% Bolus, diphenhydrAMINE, glucagon (human recombinant), glucose, glucose, insulin aspart U-100, magnesium hydroxide 400 mg/5 ml, oxyCODONE, oxyCODONE, Flushing PICC Protocol **AND** sodium chloride 0.9% **AND** sodium chloride 0.9%     Objective:     Vital Signs (Most Recent):  Temp: 98.4 °F (36.9 °C) (02/18/20 0723)  Pulse: 105 (02/18/20 0733)  Resp: 20 (02/18/20 0403)  BP: (!) 101/57 (02/18/20 0723)  SpO2: 95 % (02/18/20 0723) Vital Signs (24h Range):  Temp:  [98.3 °F (36.8 °C)-98.9 °F (37.2 °C)] 98.4 °F (36.9 °C)  Pulse:  [] 105  Resp:  [18-28] 20  SpO2:  [95 %-98 %] 95 %  BP: ()/(0-67) 101/57       Intake/Output Summary (Last 24 hours) at 2/18/2020 0855  Last data filed at 2/18/2020 0500  Gross per 24 hour   Intake  1020 ml   Output 2180 ml   Net -1160 ml       Physical Exam   Constitutional: He is oriented to person, place, and time. He appears well-developed and well-nourished.   Cardiovascular: Normal rate and regular rhythm.   VAD sounds smooth   Pulmonary/Chest: Effort normal and breath sounds normal.   Abdominal: Soft.   Neurological: He is alert and oriented to person, place, and time.   Skin: Skin is warm, dry and intact.   Psychiatric: He has a normal mood and affect.       Significant Labs:  BMP:   Recent Labs   Lab 02/18/20  0559   *   *   K 3.8   CL 91*   CO2 33*   BUN 27*   CREATININE 1.0   CALCIUM 8.8   MG 2.0     CBC:   Recent Labs   Lab 02/18/20  0559   WBC 19.92*   RBC 2.62*   HGB 6.8*   HCT 21.3*   *   MCV 81*   MCH 26.0*   MCHC 31.9*     Coagulation:   Recent Labs   Lab 02/18/20  0559   INR 1.5*   APTT 55.3*       Significant Diagnostics:  I have reviewed all pertinent imaging results/findings within the past 24 hours.    Procedure: Device Interrogation Including analysis of device parameters  Current Settings: Ventricular Assist Device  Review of device function is stable  TXP LVAD INTERROGATIONS 2/18/2020 2/18/2020 2/17/2020 2/17/2020 2/17/2020 2/17/2020 2/17/2020   Type HeartMate3 HeartMate3 HeartMate3 HeartMate3 HeartMate3 HeartMate3 -   Flow 4.6 4 4.4 4.8 4.6 4.5 4.3   Speed 5300 5300 5300 5300 5300 5300 5300   PI 3.3 3.4 3.7 3.6 3.6 3.6 3.3   Power (Centeno) 3.8 3.8 3.9 3.6 4.0 3.9 3.7   LSL 4900 4900 4900 - - 4900 4900   Pulsatility - Intermittent pulse - Intermittent pulse Pulse Pulse -

## 2020-02-18 NOTE — PLAN OF CARE
Problem: SLP Goal  Goal: SLP Goal  Description  Speech Language Pathology Goals  Goals expected to be met by 2/18/2020  1. Pt will tolerate regular diet with thin liquids w/o overt S/S aspiration, MOD I  2. Educate Pt and family on S/S aspiration and aspiration precautions       Outcome: Ongoing, Progressing     Pt seen for ongoing monitoring of tolerance of diet. No overt S/S aspiration with self-fed bites of lunch meal tray for dental soft textures with thin liquids. Pt declined trials of advanced textures, prefers soft textures. ST to follow up to monitor.     LARISSA Doyle., [facility]-SLP  Speech-Language Pathology  Pager: [phone]  2/18/2020

## 2020-02-18 NOTE — PLAN OF CARE
Plan of Care Note  Cardiothoracic Surgery    Saba Lott is a 54 y.o. male with heart failure who underwent LVAD placement (2/6/20) and chest closure (2/7/20).    Specific issues: coumadin and hep gtt; transfer to stepdown; continue norvasc, dobutamine, and lasix    Plan of care for patient was discussed with ICU staff including nurses, residents, and faculty and appropriate consulting services.    Will continue to monitor patient's hemodynamics, functionality, neuro status, fluid status and renal function, and labs and will adjust medications and fluids as necessary while monitoring appropriateness for de-escalation of support and monitoring and transport to stepdown unit.    Benjy Street MD  Cardiothoracic Surgery Fellow

## 2020-02-18 NOTE — PT/OT/SLP PROGRESS
Occupational Therapy   Treatment    Name: Saba Lott  MRN: [id number]  Admitting Diagnosis:  LVAD (left ventricular assist device) present  11 Days Post-Op    Recommendations:     Discharge Recommendations: home health OT, home health PT  Discharge Equipment Recommendations:  none  Barriers to discharge:  None    Assessment:     Saba Lott is a 54 y.o. male with a medical diagnosis of LVAD (left ventricular assist device) present.  He presents with the following performance deficits affecting function: weakness, impaired self care skills, impaired cardiopulmonary response to activity, impaired functional mobilty, gait instability, impaired balance, impaired endurance. Pt tolerated session fairly well this date. Pt performed standing ADLs at sink along with toileting in bathroom with CGA and no AD. Pt required minimal verbal cues to maintain sternal precautions during the session. Pt limited this date by dizziness affecting his balance and tolerance to prolonged functional tasks. OT POC remains appropriate for patient on this date. Pt will continue to benefit from skilled OT to address the deficits listed above.    Rehab Prognosis:  Good; patient would benefit from acute skilled OT services to address these deficits and reach maximum level of function.       Plan:     Patient to be seen 6 x/week to address the above listed problems via self-care/home management, therapeutic activities, therapeutic exercises  · Plan of Care Expires: 03/11/20  · Plan of Care Reviewed with: patient    Subjective     Pain/Comfort:  · Pain Rating 1: 0/10  · Pain Rating Post-Intervention 1: 0/10    Objective:     Communicated with: RN prior to session.  Patient found up in chair with peripheral IV, telemetry, PICC line, LVAD upon OT entry to room.    General Precautions: Standard, fall, sternal, LVAD   Orthopedic Precautions:N/A   Braces: N/A     Occupational Performance:     Bed Mobility:    · NT 2/2 pt UIC upon OT's  arrival    Functional Mobility/Transfers:  · Patient completed Sit <> Stand Transfer from chair with minimum assistance  with  no assistive device   · 1st attempt unsuccessful; 2nd attempt successful after anterior scooting completed in chair  · Cuing to adhere to sternal precautions   · Functional Mobility: Pt ambulated to and from the restroom with CGA and no AD. Pt had one major LOB with therapist facilitation to regain balance. Pt reported of increased dizziness during task affecting his balance causing him to lean towards the bed for support. Pt recovered while seated in chair. RN notified.   · No RBs required    Activities of Daily Living:  · Grooming: contact guard assistance in standing for dental and facial hygiene  · Toileting: contact guard assistance in standing for toileting routine      [facility] 6 Click ADL: 18    Treatment & Education:  - Role of OT/ OT POC  - Self care safety/ independence  - Functional transfer/ mobility safety  - Importance of sitting UIC  - Energy conservation techniques such as pacing and taking rest breaks as needed  - Importance of effective communication when needing a rest break 2/2 dizziness  - Functional performance this date   - Pt switched to battery power during the session requiring assistance with changing of power cords. Pt completed self test and recorded numbers in binder with RN prior to OT's arrival. Consolidation bag donned and adjusted during session.     Patient left up in chair with all lines intact, call button in reach, RN notified and family presentEducation:      GOALS:   Multidisciplinary Problems     Occupational Therapy Goals        Problem: Occupational Therapy Goal    Goal Priority Disciplines Outcome Interventions   Occupational Therapy Goal     OT, PT/OT Ongoing, Progressing    Description:  Goals to be met by: 2/24/20     Patient will increase functional independence with ADLs by performing:    Feeding with [place].  UE Dressing with  Supervision.  LE Dressing with Supervision.  Grooming while standing at sink with Supervision.- Initiated   Toileting from toilet with Supervision for hygiene and clothing management.   Toilet transfer to toilet with Supervision.  Pt will be (I) with VAD management during ADL. - progressing               Multidisciplinary Problems (Resolved)        Problem: Occupational Therapy Goal    Goal Priority Disciplines Outcome Interventions   Occupational Therapy Goal   (Resolved)     OT, PT/OT Met    Description:  Goals to be met by: 7 days 1/30/20     Patient will increase functional independence with ADLs by performing:    Pt to complete UE dressing with set-up-MET  Pt to complete LE dressing with SBA with AE-MET   Pt to complete toileting with supervision.--MET  Pt to complete standing g/h skills with supervision.--MET  Pt to complete t/f to commode, bed and chair with SBA--MET                         Time Tracking:     OT Date of Treatment: 02/18/20  OT Start Time: 0954  OT Stop Time: 1029  OT Total Time (min): 35 min    Billable Minutes:Self Care/Home Management 20  Therapeutic Activity 15    Yarelis Castellano, OT  2/18/2020

## 2020-02-18 NOTE — PLAN OF CARE
Pt remains free from falls and injuries. VSS. Self test done. VAD numbers documented this AM. Pt educated on importance of meds and sternal precautions. Pt up in chair with meals and CBG monitored ACHS.  titrated down. Heparin infusing with no nomagram according to MD orders. Mc removed and pt has voided with no issues.

## 2020-02-18 NOTE — PHYSICIAN QUERY
"PT Name: Saba Lott  MR #: [id number]    Physician Query Form - Hematology Clarification      CDS/: Tisha Ferguson RN, CCDS             Contact information: [email or web address]    This form is a permanent document in the medical record.      Query Date: February 18, 2020    By submitting this query, we are merely seeking further clarification of documentation. Please utilize your independent clinical judgment when addressing the question(s) below.    The Medical record contains the following:   Indicators  Supporting Clinical Findings Location in Medical Record    "Anemia" documented     X H & H = 12.3/37.5->10.4/32.5->9.1/29.2->7.9/25.7  7.3/23.8-->7.0/23.9-->6.8/21.3 2/6. 2/7,  2/12, 2/14, 2/16-2/18 lab    BP =                     HR=      "GI bleeding" documented      Acute bleeding (Non GI site)      Transfusion(s)     X Treatment: CBC daily 2/7 md orders   X Other:  Implantation of left ventricular assist device-HeartMate 3  Mitral valve repair-  EBL- 200 cc 2/6 op note     Provider, please specify diagnosis or diagnoses associated with above clinical findings.    [ x ] Acute blood loss anemia expected post-operatively       [  ] Other Hematological Diagnosis (please specify):     [  ] Clinically Undetermined       Please document in your progress notes daily for the duration of treatment, until resolved, and include in your discharge summary.                                                                                                      "

## 2020-02-18 NOTE — PROGRESS NOTES
Notified MD Harvey that patient's APTT is 35.9. Heparin to be increased to 1200. Will continue to monitor.

## 2020-02-18 NOTE — PLAN OF CARE
Goals reviewed and remain appropriate. Pt progressing towards goals.    Pat Montgomery, PT, DPT   2020  [phone]    Problem: Physical Therapy Goal  Goal: Physical Therapy Goal  Description  Goals to be met by: 3/9/2020     Patient will increase functional independence with mobility by performin. Supine to sit with CGA- not met  2. Sit to stand transfer with Supervision- not met  3. Gait  x 150 feet with Supervision - not met          Outcome: Ongoing, Progressing

## 2020-02-18 NOTE — ASSESSMENT & PLAN NOTE
-Creatinine was 3.0 on admit and got as high as 4.3 at OSH prior to transfer. Renal function was normal 8 months ago.  -Cr has since normalized.

## 2020-02-18 NOTE — PT/OT/SLP PROGRESS
"Speech Language Pathology Treatment    Patient Name:  Saba Lott   MRN:  [id number]  Admitting Diagnosis: LVAD (left ventricular assist device) present    Recommendations:                 General Recommendations:  Dysphagia therapy  Diet recommendations:  Dental Soft, Liquid Diet Level: Thin   Aspiration Precautions: 1 bite/sip at a time, Eliminate distractions, Feed only when awake/alert, HOB to 90 degrees, Meds whole 1 at a time, No straws, Small bites/sips and Standard aspiration precautions   General Precautions: Standard, aspiration, fall, LVAD, sternal  Communication strategies:  none    Subjective     SLP reviewed Pt with RN, RN reported Pt tolerating meals and medications w/o difficulty   SLP reviewed Pt with MD team, MD team confirmed orders discontinued in error and re-ordered accordingly   Patient explains, "The meats are softer" when asked why he wanted to stick with dental soft diet     Pain/Comfort:  · Pain Rating 1: 5/10  · Location - Orientation 1: generalized  · Location 1: chest  · Pain Addressed 1: Reposition, Distraction, Cessation of Activity, Nurse notified  · Pain Rating Post-Intervention 1: 5/10    Objective:     Has the patient been evaluated by SLP for swallowing?   Yes  Keep patient NPO? No   Current Respiratory Status: room air      Pt seen for dysphagia therapy. He was found awake in chair in room with call light in reach.  His mother and RN were in room with Pt.  Pt was alert and attentive. He demonstrated a mildly strained vocal quality with adequate intensity.  Pt with functional labial, lingual and mandibular strength/coordination/ROM upon informal review of oral mechanism.  RN in room assisted SLP in repositioning Pt more upright in chair then exited room.  Pt was seen with self-fed bites of semi-soft items (spaghetti, meatballs) and cup edge sips thin liquids. No overt S/S aspiration with bites of dental soft textures and sips thin liquids provided occasional cues to monitor size of " sips and refrain from continuous cup edge sips.  Pt and Mother were educated on diet recs including advancement to thin liquids, safe swallow strategies, S/S aspiration for ongoing monitoring and SLP POC. No questions noted. Pt remained upright in chair in room as found with call light in reach and mother in room upon SLP exit.     Assessment:     Saba Lott is a 54 y.o. male with an SLP diagnosis of resolving dysphonia and dyspahgia s/p extubation. .  He presents with good progress towards goals.    Goals:   Multidisciplinary Problems     SLP Goals        Problem: SLP Goal    Goal Priority Disciplines Outcome   SLP Goal     SLP Ongoing, Progressing   Description:  Speech Language Pathology Goals  Goals expected to be met by 2/18/2020  1. Pt will tolerate regular diet with thin liquids w/o overt S/S aspiration, MOD I  2. Educate Pt and family on S/S aspiration and aspiration precautions                  Multidisciplinary Problems (Resolved)        Problem: SLP Goal    Goal Priority Disciplines Outcome   SLP Goal   (Resolved)     SLP Met                   Plan:     · Patient to be seen:  4 x/week   · Plan of Care expires:  03/12/20  · Plan of Care reviewed with:  patient   · SLP Follow-Up:  Yes       Discharge recommendations:  (pending PT/OT recs, no additional ST needs anticipated following d/c from acute )     Time Tracking:     SLP Treatment Date:   02/18/20  Speech Start Time:  1130  Speech Stop Time:  1147     Speech Total Time (min):  17 min    Billable Minutes: Treatment Swallowing Dysfunction 9 and Self Care/Home Management Training 8    LARISSA Doyle., [facility]-SLP  Speech-Language Pathology  Pager: [phone]        02/18/2020

## 2020-02-18 NOTE — ASSESSMENT & PLAN NOTE
HeartMate 3 Implanted 02/06/2020 as DT. Chest closed 2/7.     -INR sub therapeutic Coumadin 7.5mg tonight   - Goal INR 2.0-3.0.   -Antiplatelets  mg.  -LDH is stable   -CVP 12   -  2.5mcg/kg/mn and lasix 10mg/hr.   - Diuril 250mg daily.   -amlodipine 10mg daily and hydralazine 25mg Q8H.   - K dur added on for replacement   -Speed set at 5300.  -Not listed for OHTx.  - Mc still in place for urinary retention         Dispo:  -HTS today.

## 2020-02-18 NOTE — ASSESSMENT & PLAN NOTE
-HeartMate 3 Implanted 02/06/2020 as DT. Chest closed 2/7.   -CTS Primary.  -Implanted by Dr. Joshi.  -INR sub therapeutic Coumadin, Goal INR 2.0-3.0. Anticoag per CTS.   -Antiplatelets  mg.  -LDH is stable overall today. Will continue to monitor daily.  -CVP 12. Continues on  5mcg/kg/mn and lasix 10mg/hr. Also getting diuril 250mg daily.   -BP: Receiving amlodipine 10mg daily and hydralazine 25mg Q8H.   -Speed set at 5300.  -Not listed for OHTx.    Procedure: Device Interrogation Including analysis of device parameters.  Current Settings: Ventricular Assist Device.  Review of device function is stable/unstable stable.    TXP LVAD INTERROGATIONS 2/18/2020 2/17/2020 2/17/2020 2/17/2020 2/17/2020 2/17/2020 2/17/2020   Type HeartMate3 HeartMate3 HeartMate3 HeartMate3 HeartMate3 - -   Flow 4 4.4 4.8 4.6 4.5 4.3 4.5   Speed 5300 5300 5300 5300 5300 5300 5300   PI 3.4 3.7 3.6 3.6 3.6 3.3 3.3   Power (Centeno) 3.8 3.9 3.6 4.0 3.9 3.7 3.8   LSL 4900 4900 - - 4900 4900 4900   Pulsatility Intermittent pulse - Intermittent pulse Pulse Pulse - -

## 2020-02-18 NOTE — PROGRESS NOTES
[facility]  Heart Transplant  Progress Note  Patient Name: Saba Lott  MRN: [id number]  Admission Date: 1/15/2020  Hospital Length of Stay: 34 days  Attending Physician: David Joshi MD  Primary Care Provider: Primary Doctor No  Principal Problem:LVAD (left ventricular assist device) present    Subjective:     Interval History: Had a constant dry cough that kept him up overnight but it improved early this am. Otherwise no complaints. Eating well and having regular BMs. Mother at bedside.     Continuous Infusions:   DOBUTamine 5 mcg/kg/min (02/17/20 1700)    furosemide (LASIX) 2 mg/mL continuous infusion (non-titrating) 10 mg/hr (02/17/20 1700)    heparin (porcine) in 5 % dex 1,000 Units/hr (02/18/20 0212)     Scheduled Meds:   acetaminophen  650 mg Oral Q6H    amLODIPine  10 mg Oral Daily    aspirin  325 mg Per NG tube Daily    chlorothiazide (DIURIL) IVPB  250 mg Intravenous Q24H    docusate sodium  100 mg Oral BID    ferrous gluconate  324 mg Oral Daily with breakfast    hydrALAZINE  25 mg Oral Q8H    magnesium oxide  800 mg Oral TID    pantoprazole  40 mg Oral Daily    polyethylene glycol  17 g Oral Daily    sodium chloride 0.9%  10 mL Intravenous Q6H     PRN Meds:albumin human 5%, albuterol sulfate, benzonatate, bisacodyL, Dextrose 10% Bolus, Dextrose 10% Bolus, Dextrose 10% Bolus, Dextrose 10% Bolus, Dextrose 10% Bolus, Dextrose 10% Bolus, diphenhydrAMINE, glucagon (human recombinant), glucose, glucose, hydrALAZINE, insulin aspart U-100, magnesium hydroxide 400 mg/5 ml, oxyCODONE, oxyCODONE, Flushing PICC Protocol **AND** sodium chloride 0.9% **AND** sodium chloride 0.9%    Review of patient's allergies indicates:   Allergen Reactions    Iodine and iodide containing products      Objective:     Vital Signs (Most Recent):  Temp: 98.4 °F (36.9 °C) (02/18/20 0723)  Pulse: 105 (02/18/20 0733)  Resp: 20 (02/18/20 0403)  BP: (!) 101/57 (02/18/20 0723)  SpO2: 95 % (02/18/20 0723)  Vital Signs (24h Range):  Temp:  [98.3 °F (36.8 °C)-98.9 °F (37.2 °C)] 98.4 °F (36.9 °C)  Pulse:  [] 105  Resp:  [18-28] 20  SpO2:  [95 %-98 %] 95 %  BP: ()/(0-67) 101/57     Patient Vitals for the past 72 hrs (Last 3 readings):   Weight   02/17/20 0713 89 kg (196 lb 3.4 oz)     Body mass index is 30.73 kg/m².      Intake/Output Summary (Last 24 hours) at 2/18/2020 0756  Last data filed at 2/18/2020 0500  Gross per 24 hour   Intake 1020 ml   Output 2255 ml   Net -1235 ml     Physical Exam   Constitutional: He is oriented to person, place, and time. He appears well-developed and well-nourished.   HENT:   Head: Normocephalic and atraumatic.   Cardiovascular: Normal rate and regular rhythm.   VAD hum.    Pulmonary/Chest: Effort normal. No respiratory distress.   Abdominal: Soft.   Genitourinary:   Genitourinary Comments: Mc cath in place   Neurological: He is alert and oriented to person, place, and time.   Skin: Skin is warm and dry.   Psychiatric: He has a normal mood and affect. His behavior is normal.   Nursing note and vitals reviewed.    Significant Labs:  CBC:  Recent Labs   Lab 02/16/20  0300 02/17/20  0347 02/18/20  0559   WBC 19.00* 21.32* 19.92*   RBC 2.87* 2.84* 2.62*   HGB 7.3* 7.0* 6.8*   HCT 23.8* 23.9* 21.3*    397* 397*   MCV 83 84 81*   MCH 25.4* 24.6* 26.0*   MCHC 30.7* 29.3* 31.9*     BNP:  Recent Labs   Lab 02/12/20  0400 02/14/20  0400 02/17/20  0347   BNP 1,356* 808* 637*     CMP:  Recent Labs   Lab 02/15/20  0400  02/16/20  0300  02/17/20  0347  02/17/20  1529 02/17/20  2344 02/18/20  0559   *  162*   < > 156*  156*   < > 166*  166*   < > 115* 150* 163*   CALCIUM 9.1  9.1   < > 9.0  9.0   < > 9.0  9.0   < > 9.0 9.0 8.8   ALBUMIN 2.6*  --  2.4*  --  2.4*  2.4*  --   --   --   --    PROT 7.8  --  7.7  --  7.9  7.9  --   --   --   --    *  135*   < > 141  141   < > 135*  135*   < > 134* 130* 132*   K 4.4  4.4  4.4  4.4   < > 3.9  3.9  3.9   < > 4.7   4.7   < > 4.4 3.9 3.8   CO2 34*  34*   < > 32*  32*   < > 33*  33*   < > 33* 32* 33*   CL 93*  93*   < > 97  97   < > 93*  93*   < > 92* 89* 91*   BUN 38*  38*   < > 37*  37*   < > 30*  30*   < > 31* 30* 27*   CREATININE 1.2  1.2   < > 1.2  1.2   < > 1.1  1.1   < > 1.2 1.2 1.0   ALKPHOS 125  --  117  --  118  118  --   --   --   --    ALT 29  --  23  --  26  26  --   --   --   --    AST 29  --  25  --  30  30  --   --   --   --    BILITOT 0.7  --  0.7  --  0.6  0.6  --   --   --   --     < > = values in this interval not displayed.      Coagulation:   Recent Labs   Lab 02/16/20  0300  02/17/20  0347  02/17/20  1749 02/17/20  2344 02/18/20  0559   INR 1.2  --  1.5*  --   --   --  1.5*   APTT 40.7*  40.7*  40.7*   < >  --    < > 35.9* 60.2* 55.3*    < > = values in this interval not displayed.     LDH:  Recent Labs   Lab 02/16/20  0300 02/17/20  0347   * 558*     Microbiology:  Microbiology Results (last 7 days)     ** No results found for the last 168 hours. **        I have reviewed all pertinent labs within the past 24 hours.    Estimated Creatinine Clearance: 88.9 mL/min (based on SCr of 1 mg/dL).    Diagnostic Results:  I have reviewed and interpreted all pertinent imaging results/findings within the past 24 hours.    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 Chronic MARC - Class II-III and mild LE edema.      Hospital Course (synopsis of major diagnoses, care, treatment, and services provided during the course of the hospital stay):  -2/12 admit to CDU for diuresis with IV lasix  -IV abx   -CXR with suspected small left pleural effusion with associated left basilar atelectasis versus evolving airspace disease  -2/13 single chamber ICD implant; IV lasix decreased to BID  -2/16 add dobutamine,  hold BB due to hypotension, no ACE-I or ARB due to recent HPI hypotension  -2/17 Lasix changed to PO  -2/18 dobutamine discontinued    Admitted to [facility] on Thursday due to severe SOB for a week with associated orthopnea, MARC, and PND unable to lie flat and found to be in acute diastolic heart failure. States he normally weighs around 219 but up to 254lb on admission tonight. Says he hasn't been the most compliant in terms of fluid restriction and daily weights but has avoided salt. BNP on admission was 2375. BUN/CRT 32/1.4 He was started on IV diuretics but continued to deteriorate. Creatinine continued to increase and reached 4.3 with oliguria. He was started on CRRT for a few days and tolerated well. Renal u/s was negative for obstruction and liver u/s without findings of cirrhosis. All of this was progression of cardiorenal syndrome with liver congestion from severe volume overload. On arrival vitals stable and in no acute distress. He has obvious anasarca up to the chest. He did have uop of 500 at time of arrival also.    TTE 5/28/19  · Moderate left ventricular enlargement.  · Severely decreased left ventricular systolic function. The estimated ejection fraction is 20%  · Global hypokinetic wall motion.  · Grade III (severe) left ventricular diastolic dysfunction consistent with restrictive physiology.  · Severe left atrial enlargement.  · Moderate right ventricular enlargement.  · Low normal right ventricular systolic function.  · Mild right atrial enlargement.  · Moderate mitral regurgitation.  Mild to moderate tricuspid regurgitation    * LVAD (left ventricular assist device) present  -HeartMate 3 Implanted 02/06/2020 as DT. Chest closed 2/7.   -CTS Primary.  -Implanted by Dr. Joshi.  -INR sub therapeutic Coumadin, Goal INR 2.0-3.0. Anticoag per CTS.   -Antiplatelets  mg.  -LDH is stable overall today. Will continue to monitor daily.  -CVP 12. Continues on  5mcg/kg/mn and lasix  10mg/hr. Also getting diuril 250mg daily.   -BP: Receiving amlodipine 10mg daily and hydralazine 25mg Q8H.   -Speed set at 5300.  -Not listed for OHTx.    Procedure: Device Interrogation Including analysis of device parameters.  Current Settings: Ventricular Assist Device.  Review of device function is stable/unstable stable.    TXP LVAD INTERROGATIONS 2/18/2020 2/17/2020 2/17/2020 2/17/2020 2/17/2020 2/17/2020 2/17/2020   Type HeartMate3 HeartMate3 HeartMate3 HeartMate3 HeartMate3 - -   Flow 4 4.4 4.8 4.6 4.5 4.3 4.5   Speed 5300 5300 5300 5300 5300 5300 5300   PI 3.4 3.7 3.6 3.6 3.6 3.3 3.3   Power (Centeno) 3.8 3.9 3.6 4.0 3.9 3.7 3.8   LSL 4900 4900 - - 4900 4900 4900   Pulsatility Intermittent pulse - Intermittent pulse Pulse Pulse - -       ANJELICA (acute kidney injury)  -Creatinine was 3.0 on admit and got as high as 4.3 at OSH prior to transfer. Renal function was normal 8 months ago.  -Cr has since normalized.     Leukocytosis  -Afebrile.   -Would remove Mc.     Acute hyperglycemia  -HgA1C 6.6  -Endocrine following    Moderate malnutrition  - Boost added 1/27. Dr Joshi advised pt to not consume.   - Last Prealbumin 13.    Morbid obesity  -Weight down considerably since admit with diuresis.    Cardiogenic shock  -NICM; Likely Etoh induced  -Transferred from [facility] with IABP at 1:1 for further level of care. IABP removed 1/25 and replaced 2/3.   -S/P HMIII on 2/3.     Deep vein thrombosis (DVT) of right lower extremity  -Unsure of timing but was on eliquis PTA.   -Will anticoag with heparin/warfarin post VAD.     AICD (automatic cardioverter/defibrillator) present  -Medtronic single chamber ICD placed 2019 for primary prevention    Mickey Rider NP  Heart Transplant  [facility]

## 2020-02-18 NOTE — PLAN OF CARE
Problem: Occupational Therapy Goal  Goal: Occupational Therapy Goal  Description  Goals to be met by: 2/24/20     Patient will increase functional independence with ADLs by performing:    Feeding with [place].  UE Dressing with Supervision.  LE Dressing with Supervision.  Grooming while standing at sink with Supervision.- Initiated   Toileting from toilet with Supervision for hygiene and clothing management.   Toilet transfer to toilet with Supervision.  Pt will be (I) with VAD management during ADL. - progressing    Outcome: Ongoing, Progressing     Pt progressing well towards OT goals. No goals met this date. Pt limited by dizziness this date. OT POC remains appropriate for patient on this date. Pt will continue to benefit from skilled OT to address the deficits affecting his occupational performance.     Yarelis Castellano OTR/L  2/18/20

## 2020-02-18 NOTE — PT/OT/SLP PROGRESS
Physical Therapy Treatment    Patient Name:  Saba Lott   MRN:  [id number]    Recommendations:     Discharge Recommendations:  home health PT, home health OT   Discharge Equipment Recommendations: (TBD)   Barriers to discharge: Decreased caregiver support    Assessment:     Saba Lott is a 54 y.o. male admitted with a medical diagnosis of LVAD (left ventricular assist device) present.  He presents with the following impairments/functional limitations:  weakness, impaired self care skills, impaired balance, impaired endurance, impaired functional mobilty, gait instability, impaired cardiopulmonary response to activity. Pt progressing functional mobility, as he was able to ambulate increased distance this date and with apparent improvement in mechanics noted. Steady gait noted with use of platform RW. However, required increased assist to transfer to stand from standard chair height while maintaining sternal precautions. Pt would continue to benefit from skilled acute PT in order to address current deficits and progress functional mobility.     Rehab Prognosis: Good; patient would benefit from acute skilled PT services to address these deficits and reach maximum level of function.    Recent Surgery: Procedure(s) (LRB):  CLOSURE, WOUND, STERNUM (N/A)  WASHOUT  INSERTION, GRAFT, PERICARDIUM  APPLICATION, WOUND VAC 11 Days Post-Op    Plan:     During this hospitalization, patient to be seen 5 x/week to address the identified rehab impairments via gait training, therapeutic activities, therapeutic exercises, neuromuscular re-education and progress toward the following goals:    · Plan of Care Expires:  03/10/20    Subjective     Chief Complaint: none noted   Patient/Family Comments/goals: return home  Pain/Comfort:  · Pain Rating 1: 0/10      Objective:     Communicated with RN prior to session.  Patient found up in chair with telemetry, PICC line, LVAD upon PT entry to room.     General Precautions: Standard,  aspiration, fall, LVAD, sternal   Orthopedic Precautions:N/A   Braces: N/A     Functional Mobility:  · Transfers:     · Sit to Stand:  minimum assistance and of 2 persons with no AD from bedside chair   · Initial failed attempt, followed by 2 successful reps  · Cues provided for LE positioning and use of forward momentum to assist with ease of transfer  · Pt's mother assisting with transfer this date for family training in preparation for d/c   · Gait: ~250 ft. with B platform RW and CGA  · Pt brought to hallway via chair and returned to start position via chair 2* increased obstacles in pt's room. Chair follow throughout gait training 2* episode of dizziness this AM; however, denied dizziness throughout and no seated rest break needed   · Emergency bag present throughout session   · demo'd decreased ladarius and decreased step length; otherwise steady gait noted  · Cues provided for self-pacing and safety awareness       AM-PAC 6 CLICK MOBILITY  Turning over in bed (including adjusting bedclothes, sheets and blankets)?: 3  Sitting down on and standing up from a chair with arms (e.g., wheelchair, bedside commode, etc.): 2  Moving from lying on back to sitting on the side of the bed?: 2  Moving to and from a bed to a chair (including a wheelchair)?: 3  Need to walk in hospital room?: 3  Climbing 3-5 steps with a railing?: 2  Basic Mobility Total Score: 15       Therapeutic Activities and Exercises:   Pt found on LVAD battery power with consolidation bag organized. Required assist to don consolidation bag. Edu provided on proper positioning of consolidation bag with driveline facing anteriorly.   Reviewed sternal precautions. Pt able to maintain throughout session with min v/c and use of cardiac pillow.  Caregiver edu provided for safe transfer technique with pt's mother verbalizing and demonstrating understanding.      Patient left up in chair with all lines intact, call button in reach and pt's mother  present..    GOALS:   Multidisciplinary Problems     Physical Therapy Goals        Problem: Physical Therapy Goal    Goal Priority Disciplines Outcome Goal Variances Interventions   Physical Therapy Goal     PT, PT/OT Ongoing, Progressing     Description:  Goals to be met by: 3/9/2020     Patient will increase functional independence with mobility by performin. Supine to sit with CGA- not met  2. Sit to stand transfer with Supervision- not met  3. Gait  x 150 feet with Supervision - not met                           Time Tracking:     PT Received On: 20  PT Start Time: 1428     PT Stop Time: 1512  PT Total Time (min): 44 min     Billable Minutes: Gait Training 20 and Therapeutic Activity 24    Treatment Type: Treatment  PT/PTA: PT     PTA Visit Number: 0     Pat Montgomery, PT, DPT   2020  [phone]

## 2020-02-18 NOTE — PLAN OF CARE
Pt AAO and VSS. Pt LVAD numbers WNL and smooth vad hum heard. Pt with c/o pain and coughing, PRN medications provided relief. Pt with , heparin, and Lasix gtts. Pt educated on fall risk overnight,pt remained free from falls/trauma/injury. Denies chest pain, SOB, palpitations, dizziness, pain, or discomfort. Plan of care reviewed with pt, all questions answered. Bed locked in lowest position, call bell within reach, no acute distress noted, will continue to monitor.

## 2020-02-18 NOTE — PROGRESS NOTES
02/18/2020  Miko Rivera    Current provider:  David Joshi MD      I, Miko Rivera, rounded on Saba Lott to ensure all mechanical assist device settings (IABP or VAD) were appropriate and all parameters were within limits.  I was able to ensure all back up equipment was present, the staff had no issues, and the Perfusion Department daily rounding was complete.    7:15 AM

## 2020-02-18 NOTE — SUBJECTIVE & OBJECTIVE
Interval History: Had a constant dry cough that kept him up overnight but it improved early this am. Otherwise no complaints. Eating well and having regular BMs. Mother at bedside.     Continuous Infusions:   DOBUTamine 5 mcg/kg/min (02/17/20 1700)    furosemide (LASIX) 2 mg/mL continuous infusion (non-titrating) 10 mg/hr (02/17/20 1700)    heparin (porcine) in 5 % dex 1,000 Units/hr (02/18/20 0212)     Scheduled Meds:   acetaminophen  650 mg Oral Q6H    amLODIPine  10 mg Oral Daily    aspirin  325 mg Per NG tube Daily    chlorothiazide (DIURIL) IVPB  250 mg Intravenous Q24H    docusate sodium  100 mg Oral BID    ferrous gluconate  324 mg Oral Daily with breakfast    hydrALAZINE  25 mg Oral Q8H    magnesium oxide  800 mg Oral TID    pantoprazole  40 mg Oral Daily    polyethylene glycol  17 g Oral Daily    sodium chloride 0.9%  10 mL Intravenous Q6H     PRN Meds:albumin human 5%, albuterol sulfate, benzonatate, bisacodyL, Dextrose 10% Bolus, Dextrose 10% Bolus, Dextrose 10% Bolus, Dextrose 10% Bolus, Dextrose 10% Bolus, Dextrose 10% Bolus, diphenhydrAMINE, glucagon (human recombinant), glucose, glucose, hydrALAZINE, insulin aspart U-100, magnesium hydroxide 400 mg/5 ml, oxyCODONE, oxyCODONE, Flushing PICC Protocol **AND** sodium chloride 0.9% **AND** sodium chloride 0.9%    Review of patient's allergies indicates:   Allergen Reactions    Iodine and iodide containing products      Objective:     Vital Signs (Most Recent):  Temp: 98.4 °F (36.9 °C) (02/18/20 0723)  Pulse: 105 (02/18/20 0733)  Resp: 20 (02/18/20 0403)  BP: (!) 101/57 (02/18/20 0723)  SpO2: 95 % (02/18/20 0723) Vital Signs (24h Range):  Temp:  [98.3 °F (36.8 °C)-98.9 °F (37.2 °C)] 98.4 °F (36.9 °C)  Pulse:  [] 105  Resp:  [18-28] 20  SpO2:  [95 %-98 %] 95 %  BP: ()/(0-67) 101/57     Patient Vitals for the past 72 hrs (Last 3 readings):   Weight   02/17/20 0713 89 kg (196 lb 3.4 oz)     Body mass index is 30.73  kg/m².      Intake/Output Summary (Last 24 hours) at 2/18/2020 0756  Last data filed at 2/18/2020 0500  Gross per 24 hour   Intake 1020 ml   Output 2255 ml   Net -1235 ml     Physical Exam   Constitutional: He is oriented to person, place, and time. He appears well-developed and well-nourished.   HENT:   Head: Normocephalic and atraumatic.   Cardiovascular: Normal rate and regular rhythm.   VAD hum.    Pulmonary/Chest: Effort normal. No respiratory distress.   Abdominal: Soft.   Genitourinary:   Genitourinary Comments: Mc cath in place   Neurological: He is alert and oriented to person, place, and time.   Skin: Skin is warm and dry.   Psychiatric: He has a normal mood and affect. His behavior is normal.   Nursing note and vitals reviewed.    Significant Labs:  CBC:  Recent Labs   Lab 02/16/20  0300 02/17/20  0347 02/18/20  0559   WBC 19.00* 21.32* 19.92*   RBC 2.87* 2.84* 2.62*   HGB 7.3* 7.0* 6.8*   HCT 23.8* 23.9* 21.3*    397* 397*   MCV 83 84 81*   MCH 25.4* 24.6* 26.0*   MCHC 30.7* 29.3* 31.9*     BNP:  Recent Labs   Lab 02/12/20  0400 02/14/20  0400 02/17/20  0347   BNP 1,356* 808* 637*     CMP:  Recent Labs   Lab 02/15/20  0400  02/16/20  0300  02/17/20  0347  02/17/20  1529 02/17/20  2344 02/18/20  0559   *  162*   < > 156*  156*   < > 166*  166*   < > 115* 150* 163*   CALCIUM 9.1  9.1   < > 9.0  9.0   < > 9.0  9.0   < > 9.0 9.0 8.8   ALBUMIN 2.6*  --  2.4*  --  2.4*  2.4*  --   --   --   --    PROT 7.8  --  7.7  --  7.9  7.9  --   --   --   --    *  135*   < > 141  141   < > 135*  135*   < > 134* 130* 132*   K 4.4  4.4  4.4  4.4   < > 3.9  3.9  3.9   < > 4.7  4.7   < > 4.4 3.9 3.8   CO2 34*  34*   < > 32*  32*   < > 33*  33*   < > 33* 32* 33*   CL 93*  93*   < > 97  97   < > 93*  93*   < > 92* 89* 91*   BUN 38*  38*   < > 37*  37*   < > 30*  30*   < > 31* 30* 27*   CREATININE 1.2  1.2   < > 1.2  1.2   < > 1.1  1.1   < > 1.2 1.2 1.0   ALKPHOS 125  --  117   --  118  118  --   --   --   --    ALT 29  --  23  --  26  26  --   --   --   --    AST 29  --  25  --  30  30  --   --   --   --    BILITOT 0.7  --  0.7  --  0.6  0.6  --   --   --   --     < > = values in this interval not displayed.      Coagulation:   Recent Labs   Lab 02/16/20  0300 02/17/20  0347  02/17/20  1749 02/17/20  2344 02/18/20  0559   INR 1.2  --  1.5*  --   --   --  1.5*   APTT 40.7*  40.7*  40.7*   < >  --    < > 35.9* 60.2* 55.3*    < > = values in this interval not displayed.     LDH:  Recent Labs   Lab 02/16/20 0300 02/17/20  0347   * 558*     Microbiology:  Microbiology Results (last 7 days)     ** No results found for the last 168 hours. **        I have reviewed all pertinent labs within the past 24 hours.    Estimated Creatinine Clearance: 88.9 mL/min (based on SCr of 1 mg/dL).    Diagnostic Results:  I have reviewed and interpreted all pertinent imaging results/findings within the past 24 hours.

## 2020-02-18 NOTE — PROGRESS NOTES
"[facility]  Adult Nutrition  Progress Note    SUMMARY       Recommendations  1. Encourage continued good PO intake as tolerated.   2. If PO intake decreases, recommend adding Optisource OS to all meals. RD to monitor.    Goals: Continued intake of % of EEN and EPN by RD follow up  Nutrition Goal Status: new  Communication of RD Recs: other (comment)(POC)    Reason for Assessment    Reason For Assessment: RD follow-up  Diagnosis: surgery/postoperative complications(s/p LVAD )  Relevant Medical History: CHF, NICM, HTN  Interdisciplinary Rounds: attended  General Information Comments: Pt sitting up in chair with family at bedside this AM. He reports good appetite and states he has been consuming 100% of meals, confirmed per RN documentation. Wt loss since admit, likely fluid related, as pt is -66L. NFPE , pt continues with moderate malnutrition.  Nutrition Discharge Planning: Adequate nutrition via PO intake.    Nutrition Risk Screen    Nutrition Risk Screen: dysphagia or difficulty swallowing    Nutrition/Diet History    Patient Reported Diet/Restrictions/Preferences: general  Spiritual, Cultural Beliefs, Synagogue Practices, Values that Affect Care: no  Factors Affecting Nutritional Intake: None identified at this time    Anthropometrics    Temp: 98.6 °F (37 °C)  Height Method: Estimated  Height: 5' 7" (170.2 cm)  Height (inches): 67 in  Weight Method: Bed Scale  Weight: 89 kg (196 lb 3.4 oz)  Weight (lb): 196.21 lb  Ideal Body Weight (IBW), Male: 148 lb  % Ideal Body Weight, Male (lb): 143.92 %  BMI (Calculated): 30.7  BMI Grade: 35 - 39.9 - obesity - grade II  Usual Body Weight (UBW), k kg(admit weight)  % Usual Body Weight: 75.95  % Weight Change From Usual Weight: -24.21 %    Lab/Procedures/Meds    Pertinent Labs Reviewed: reviewed  Pertinent Labs Comments: Na 132, BUN 27, Glu 163, P 4.6, Alb 2.4  Pertinent Medications Reviewed: reviewed  Pertinent Medications Comments: " dobutamine, docusate, ferrous gluconate, lasix, heparin, insulin, magnesium oxide, pantoprazole, warfarin    Estimated/Assessed Needs    Weight Used For Calorie Calculations: 75 kg (165 lb 5.5 oz)  Energy Calorie Requirements (kcal): 1930 kcal/day  Energy Need Method: [place]-St Jeor(x 1.25)  Protein Requirements:  g/day(1.2-1.4 g/kg)  Weight Used For Protein Calculations: 75 kg (165 lb 5.5 oz)  Fluid Requirements (mL): 1 mL/kcal or per MD  Estimated Fluid Requirement Method: RDA Method  RDA Method (mL): 1930    Nutrition Prescription Ordered    Current Diet Order: Dysphagia Soft  Nutrition Order Comments: 2000mL FR, nectar thick liquids  Current Nutrition Support Formula Ordered: (-)  Current Nutrition Support Rate Ordered: 0 (ml)  Current Nutrition Support Frequency Ordered: -  Oral Nutrition Supplement: -    Evaluation of Received Nutrient/Fluid Intake    Enteral Calories (kcal): 0  Enteral Protein (gm): 0  Enteral (Free Water) Fluid (mL): 0  Other Calories (kcal): 0  % Kcal Needs: 0  % Protein Needs: 0  I/O: -1.2L x 24 hrs  Energy Calories Required: (-)  Protein Required: (-)  Fluid Required: (-)  Comments: LBM 2/18  Tolerance: tolerating  % Intake of Estimated Energy Needs: 75 - 100 %  % Meal Intake: 75 - 100 %    Nutrition Risk    Level of Risk/Frequency of Follow-up: low(1x/week)     Assessment and Plan    Moderate Protein-Calorie Malnutrition  Malnutrition in the context of Chronic Illness/Injury     Related to (etiology):  Lack of appetite in setting of chronic heart failure     Signs and Symptoms (as evidenced by):  Energy Intake: <75% of estimated energy requirement for > 3 months  Body Fat Depletion: mild and moderate depletion of orbitals and triceps   Muscle Mass Depletion: moderate depletion of interosseous muscle and lower extremities   Weight Loss: -24.2% x 1 month (mostly fluid related loss)   Fluid Accumulation: mild      Interventions(treatment strategy):  Collaboration of care with  providers.     Nutrition Diagnosis Status:  Continues     Monitor and Evaluation    Food and Nutrient Intake: energy intake, food and beverage intake  Food and Nutrient Adminstration: diet order  Knowledge/Beliefs/Attitudes: food and nutrition knowledge/skill  Physical Activity and Function: nutrition-related ADLs and IADLs  Anthropometric Measurements: weight, weight change  Biochemical Data, Medical Tests and Procedures: electrolyte and renal panel, gastrointestinal profile, inflammatory profile  Nutrition-Focused Physical Findings: overall appearance     Malnutrition Assessment  Energy Intake (Malnutrition): less than 75% for greater than or equal to 3 months   Orbital Region (Subcutaneous Fat Loss): mild depletion  Upper Arm Region (Subcutaneous Fat Loss): moderate depletion  Thoracic and Lumbar Region: well nourished   [place]ism Region (Muscle Loss): well nourished  Clavicle Bone Region (Muscle Loss): well nourished  Clavicle and Acromion Bone Region (Muscle Loss): well nourished  Dorsal Hand (Muscle Loss): moderate depletion  Patellar Region (Muscle Loss): moderate depletion  Anterior Thigh Region (Muscle Loss): moderate depletion  Posterior Calf Region (Muscle Loss): moderate depletion     Nutrition Follow-Up    RD Follow-up?: Yes

## 2020-02-18 NOTE — PROGRESS NOTES
[facility]  Cardiothoracic Surgery  Progress Note    Patient Name: Saba Lott  MRN: [id number]  Admission Date: 1/15/2020  Hospital Length of Stay: 34 days  Code Status: Prior   Attending Physician: Fernandez Gr Jr.,*   Referring Provider: Jacobo Lima MD  Principal Problem:LVAD (left ventricular assist device) present        Subjective:     Post-Op Info:  Procedure(s) (LRB):  CLOSURE, WOUND, STERNUM (N/A)  WASHOUT  INSERTION, GRAFT, PERICARDIUM  APPLICATION, WOUND VAC   11 Days Post-Op      Reason for Visit:  Patient is seen in follow up management of  3    Interval History: NAEON. Pt has no complaints. Transfer to [facility] today     Medications:  Continuous Infusions:   DOBUTamine 4 mcg/kg/min (02/18/20 0813)    furosemide (LASIX) 2 mg/mL continuous infusion (non-titrating) 10 mg/hr (02/17/20 1700)    heparin (porcine) in 5 % dex       Scheduled Meds:   amLODIPine  10 mg Oral Daily    aspirin  325 mg Per NG tube Daily    chlorothiazide (DIURIL) IVPB  250 mg Intravenous Q24H    docusate sodium  100 mg Oral BID    ferrous gluconate  324 mg Oral Daily with breakfast    hydrALAZINE  25 mg Oral Q8H    magnesium oxide  800 mg Oral TID    pantoprazole  40 mg Oral Daily    polyethylene glycol  17 g Oral Daily    potassium chloride  40 mEq Oral BID    sodium chloride 0.9%  10 mL Intravenous Q6H    warfarin  7.5 mg Oral Once     PRN Meds:acetaminophen, albuterol sulfate, benzonatate, bisacodyL, Dextrose 10% Bolus, Dextrose 10% Bolus, Dextrose 10% Bolus, Dextrose 10% Bolus, Dextrose 10% Bolus, Dextrose 10% Bolus, diphenhydrAMINE, glucagon (human recombinant), glucose, glucose, insulin aspart U-100, magnesium hydroxide 400 mg/5 ml, oxyCODONE, oxyCODONE, Flushing PICC Protocol **AND** sodium chloride 0.9% **AND** sodium chloride 0.9%     Objective:     Vital Signs (Most Recent):  Temp: 98.4 °F (36.9 °C) (02/18/20 0723)  Pulse: 105 (02/18/20 0733)  Resp: 20 (02/18/20 0403)  BP: (!) 101/57  (02/18/20 0723)  SpO2: 95 % (02/18/20 0723) Vital Signs (24h Range):  Temp:  [98.3 °F (36.8 °C)-98.9 °F (37.2 °C)] 98.4 °F (36.9 °C)  Pulse:  [] 105  Resp:  [18-28] 20  SpO2:  [95 %-98 %] 95 %  BP: ()/(0-67) 101/57       Intake/Output Summary (Last 24 hours) at 2/18/2020 0855  Last data filed at 2/18/2020 0500  Gross per 24 hour   Intake 1020 ml   Output 2180 ml   Net -1160 ml       Physical Exam   Constitutional: He is oriented to person, place, and time. He appears well-developed and well-nourished.   Cardiovascular: Normal rate and regular rhythm.   VAD sounds smooth   Pulmonary/Chest: Effort normal and breath sounds normal.   Abdominal: Soft.   Neurological: He is alert and oriented to person, place, and time.   Skin: Skin is warm, dry and intact.   Psychiatric: He has a normal mood and affect.       Significant Labs:  BMP:   Recent Labs   Lab 02/18/20  0559   *   *   K 3.8   CL 91*   CO2 33*   BUN 27*   CREATININE 1.0   CALCIUM 8.8   MG 2.0     CBC:   Recent Labs   Lab 02/18/20  0559   WBC 19.92*   RBC 2.62*   HGB 6.8*   HCT 21.3*   *   MCV 81*   MCH 26.0*   MCHC 31.9*     Coagulation:   Recent Labs   Lab 02/18/20  0559   INR 1.5*   APTT 55.3*       Significant Diagnostics:  I have reviewed all pertinent imaging results/findings within the past 24 hours.    Procedure: Device Interrogation Including analysis of device parameters  Current Settings: Ventricular Assist Device  Review of device function is stable  TXP LVAD INTERROGATIONS 2/18/2020 2/18/2020 2/17/2020 2/17/2020 2/17/2020 2/17/2020 2/17/2020   Type HeartMate3 HeartMate3 HeartMate3 HeartMate3 HeartMate3 HeartMate3 -   Flow 4.6 4 4.4 4.8 4.6 4.5 4.3   Speed 5300 5300 5300 5300 5300 5300 5300   PI 3.3 3.4 3.7 3.6 3.6 3.6 3.3   Power (Centeno) 3.8 3.8 3.9 3.6 4.0 3.9 3.7   LSL 4900 4900 4900 - - 4900 4900   Pulsatility - Intermittent pulse - Intermittent pulse Pulse Pulse -     Assessment/Plan:     * LVAD (left ventricular  assist device) present  HeartMate 3 Implanted 02/06/2020 as DT. Chest closed 2/7.     -INR sub therapeutic Coumadin 7.5mg tonight   - Goal INR 2.0-3.0.   -Antiplatelets  mg.  -LDH is stable   -CVP 12   -  2.5mcg/kg/mn and lasix 10mg/hr.   - Diuril 250mg daily.   -amlodipine 10mg daily and hydralazine 25mg Q8H.   - K dur added on for replacement   -Speed set at 5300.  -Not listed for OHTx.  - Mc still in place for urinary retention         Dispo:  -HTS today.      Non-ischemic cardiomyopathy  CT does not preclude patient from advanced options. Abdominal Hernia noted on CT. Continue on pathway. Dr. Joshi to review and staff.         Mary Padilla NP  Cardiothoracic Surgery  [facility]

## 2020-02-19 PROBLEM — D62 ANEMIA DUE TO ACUTE BLOOD LOSS: Status: ACTIVE | Noted: 2020-02-19

## 2020-02-19 LAB
ABO + RH BLD: NORMAL
ALBUMIN SERPL BCP-MCNC: 2.4 G/DL (ref 3.5–5.2)
ALP SERPL-CCNC: 121 U/L (ref 55–135)
ALT SERPL W/O P-5'-P-CCNC: 23 U/L (ref 10–44)
ANION GAP SERPL CALC-SCNC: 11 MMOL/L (ref 8–16)
APTT BLDCRRT: 37.6 SEC (ref 21–32)
APTT BLDCRRT: 39.1 SEC (ref 21–32)
AST SERPL-CCNC: 26 U/L (ref 10–40)
BASOPHILS # BLD AUTO: 0.06 K/UL (ref 0–0.2)
BASOPHILS NFR BLD: 0.3 % (ref 0–1.9)
BILIRUB DIRECT SERPL-MCNC: 0.4 MG/DL (ref 0.1–0.3)
BILIRUB SERPL-MCNC: 0.5 MG/DL (ref 0.1–1)
BLD GP AB SCN CELLS X3 SERPL QL: NORMAL
BLD PROD TYP BPU: NORMAL
BLOOD UNIT EXPIRATION DATE: NORMAL
BLOOD UNIT TYPE CODE: 5100
BLOOD UNIT TYPE: NORMAL
BNP SERPL-MCNC: 755 PG/ML (ref 0–99)
BSA FOR ECHO PROCEDURE: 1.95 M2
BUN SERPL-MCNC: 5 MG/DL (ref 6–20)
CALCIUM SERPL-MCNC: 8.5 MG/DL (ref 8.7–10.5)
CHLORIDE SERPL-SCNC: 92 MMOL/L (ref 95–110)
CO2 SERPL-SCNC: 30 MMOL/L (ref 23–29)
CODING SYSTEM: NORMAL
CREAT SERPL-MCNC: 1.1 MG/DL (ref 0.5–1.4)
CRP SERPL-MCNC: 96.6 MG/L (ref 0–8.2)
CV ECHO LV RWT: 0.23 CM
DIFFERENTIAL METHOD: ABNORMAL
DISPENSE STATUS: NORMAL
DOP CALC LVOT AREA: 3.4 CM2
DOP CALC LVOT DIAMETER: 2.09 CM
E WAVE DECELERATION TIME: 193.77 MSEC
E/A RATIO: 1.38
E/E' RATIO: 20.67 M/S
ECHO LV POSTERIOR WALL: 0.72 CM (ref 0.6–1.1)
EOSINOPHIL # BLD AUTO: 0.4 K/UL (ref 0–0.5)
EOSINOPHIL NFR BLD: 1.8 % (ref 0–8)
ERYTHROCYTE [DISTWIDTH] IN BLOOD BY AUTOMATED COUNT: 19.2 % (ref 11.5–14.5)
EST. GFR  (AFRICAN AMERICAN): >60 ML/MIN/1.73 M^2
EST. GFR  (NON AFRICAN AMERICAN): >60 ML/MIN/1.73 M^2
FERRITIN SERPL-MCNC: 112 NG/ML (ref 20–300)
FINAL PATHOLOGIC DIAGNOSIS: NORMAL
FRACTIONAL SHORTENING: 10 % (ref 28–44)
GLUCOSE SERPL-MCNC: 109 MG/DL (ref 70–110)
GROSS: NORMAL
HCT VFR BLD AUTO: 20.7 % (ref 40–54)
HGB BLD-MCNC: 6.5 G/DL (ref 14–18)
IMM GRANULOCYTES # BLD AUTO: 0.67 K/UL (ref 0–0.04)
IMM GRANULOCYTES NFR BLD AUTO: 3.5 % (ref 0–0.5)
INR PPP: 1.8 (ref 0.8–1.2)
INR PPP: 1.8 (ref 0.8–1.2)
INTERVENTRICULAR SEPTUM: 0.71 CM (ref 0.6–1.1)
IRON SERPL-MCNC: 15 UG/DL (ref 45–160)
LACTATE SERPL-SCNC: 1.5 MMOL/L (ref 0.5–2.2)
LDH SERPL L TO P-CCNC: 372 U/L (ref 110–260)
LEFT ATRIUM SIZE: 3.29 CM
LEFT INTERNAL DIMENSION IN SYSTOLE: 5.65 CM (ref 2.1–4)
LEFT VENTRICLE DIASTOLIC VOLUME INDEX: 104.41 ML/M2
LEFT VENTRICLE DIASTOLIC VOLUME: 200.44 ML
LEFT VENTRICLE MASS INDEX: 92 G/M2
LEFT VENTRICLE SYSTOLIC VOLUME INDEX: 81.7 ML/M2
LEFT VENTRICLE SYSTOLIC VOLUME: 156.8 ML
LEFT VENTRICULAR INTERNAL DIMENSION IN DIASTOLE: 6.29 CM (ref 3.5–6)
LEFT VENTRICULAR MASS: 176.35 G
LV LATERAL E/E' RATIO: 17.71 M/S
LV SEPTAL E/E' RATIO: 24.8 M/S
LYMPHOCYTES # BLD AUTO: 2.3 K/UL (ref 1–4.8)
LYMPHOCYTES NFR BLD: 12.2 % (ref 18–48)
MAGNESIUM SERPL-MCNC: 2.6 MG/DL (ref 1.6–2.6)
MCH RBC QN AUTO: 25.6 PG (ref 27–31)
MCHC RBC AUTO-ENTMCNC: 31.4 G/DL (ref 32–36)
MCV RBC AUTO: 82 FL (ref 82–98)
MONOCYTES # BLD AUTO: 1.1 K/UL (ref 0.3–1)
MONOCYTES NFR BLD: 5.5 % (ref 4–15)
MV PEAK A VEL: 0.9 M/S
MV PEAK E VEL: 1.24 M/S
NEUTROPHILS # BLD AUTO: 14.6 K/UL (ref 1.8–7.7)
NEUTROPHILS NFR BLD: 76.7 % (ref 38–73)
NRBC BLD-RTO: 1 /100 WBC
NUM UNITS TRANS PACKED RBC: NORMAL
PHOSPHATE SERPL-MCNC: 3.4 MG/DL (ref 2.7–4.5)
PISA TR MAX VEL: 2.03 M/S
PLATELET # BLD AUTO: 466 K/UL (ref 150–350)
PMV BLD AUTO: 9.3 FL (ref 9.2–12.9)
POTASSIUM SERPL-SCNC: 4.2 MMOL/L (ref 3.5–5.1)
PREALB SERPL-MCNC: 16 MG/DL (ref 20–43)
PROT SERPL-MCNC: 7.4 G/DL (ref 6–8.4)
PROTHROMBIN TIME: 17.5 SEC (ref 9–12.5)
PROTHROMBIN TIME: 17.5 SEC (ref 9–12.5)
RBC # BLD AUTO: 2.54 M/UL (ref 4.6–6.2)
SATURATED IRON: 4 % (ref 20–50)
SINUS: 3.55 CM
SODIUM SERPL-SCNC: 133 MMOL/L (ref 136–145)
STJ: 3.31 CM
TDI LATERAL: 0.07 M/S
TDI SEPTAL: 0.05 M/S
TDI: 0.06 M/S
TOTAL IRON BINDING CAPACITY: 349 UG/DL (ref 250–450)
TR MAX PG: 16 MMHG
TRANSFERRIN SERPL-MCNC: 236 MG/DL (ref 200–375)
TRICUSPID ANNULAR PLANE SYSTOLIC EXCURSION: 1.26 CM
WBC # BLD AUTO: 19.1 K/UL (ref 3.9–12.7)

## 2020-02-19 PROCEDURE — 94799 UNLISTED PULMONARY SVC/PX: CPT

## 2020-02-19 PROCEDURE — 25000003 PHARM REV CODE 250: Performed by: NURSE PRACTITIONER

## 2020-02-19 PROCEDURE — 36430 TRANSFUSION BLD/BLD COMPNT: CPT

## 2020-02-19 PROCEDURE — 83880 ASSAY OF NATRIURETIC PEPTIDE: CPT

## 2020-02-19 PROCEDURE — A4216 STERILE WATER/SALINE, 10 ML: HCPCS | Performed by: STUDENT IN AN ORGANIZED HEALTH CARE EDUCATION/TRAINING PROGRAM

## 2020-02-19 PROCEDURE — 86900 BLOOD TYPING SEROLOGIC ABO: CPT

## 2020-02-19 PROCEDURE — 63600175 PHARM REV CODE 636 W HCPCS: Performed by: STUDENT IN AN ORGANIZED HEALTH CARE EDUCATION/TRAINING PROGRAM

## 2020-02-19 PROCEDURE — 85610 PROTHROMBIN TIME: CPT

## 2020-02-19 PROCEDURE — 25000003 PHARM REV CODE 250: Performed by: STUDENT IN AN ORGANIZED HEALTH CARE EDUCATION/TRAINING PROGRAM

## 2020-02-19 PROCEDURE — 92526 ORAL FUNCTION THERAPY: CPT

## 2020-02-19 PROCEDURE — 36415 COLL VENOUS BLD VENIPUNCTURE: CPT

## 2020-02-19 PROCEDURE — 99232 SBSQ HOSP IP/OBS MODERATE 35: CPT | Mod: ,,, | Performed by: INTERNAL MEDICINE

## 2020-02-19 PROCEDURE — 85730 THROMBOPLASTIN TIME PARTIAL: CPT | Mod: 91

## 2020-02-19 PROCEDURE — 99900035 HC TECH TIME PER 15 MIN (STAT)

## 2020-02-19 PROCEDURE — 27000685 HC LVAD KIT (30 DAY SUPPLY)

## 2020-02-19 PROCEDURE — 97530 THERAPEUTIC ACTIVITIES: CPT

## 2020-02-19 PROCEDURE — 83735 ASSAY OF MAGNESIUM: CPT

## 2020-02-19 PROCEDURE — 83615 LACTATE (LD) (LDH) ENZYME: CPT

## 2020-02-19 PROCEDURE — 82728 ASSAY OF FERRITIN: CPT

## 2020-02-19 PROCEDURE — 99232 PR SUBSEQUENT HOSPITAL CARE,LEVL II: ICD-10-PCS | Mod: ,,, | Performed by: INTERNAL MEDICINE

## 2020-02-19 PROCEDURE — 84134 ASSAY OF PREALBUMIN: CPT

## 2020-02-19 PROCEDURE — 63600175 PHARM REV CODE 636 W HCPCS: Performed by: NURSE PRACTITIONER

## 2020-02-19 PROCEDURE — 83605 ASSAY OF LACTIC ACID: CPT

## 2020-02-19 PROCEDURE — 27000248 HC VAD-ADDITIONAL DAY

## 2020-02-19 PROCEDURE — 86920 COMPATIBILITY TEST SPIN: CPT

## 2020-02-19 PROCEDURE — 94761 N-INVAS EAR/PLS OXIMETRY MLT: CPT

## 2020-02-19 PROCEDURE — 85730 THROMBOPLASTIN TIME PARTIAL: CPT

## 2020-02-19 PROCEDURE — 97110 THERAPEUTIC EXERCISES: CPT

## 2020-02-19 PROCEDURE — P9016 RBC LEUKOCYTES REDUCED: HCPCS

## 2020-02-19 PROCEDURE — 94664 DEMO&/EVAL PT USE INHALER: CPT

## 2020-02-19 PROCEDURE — 85025 COMPLETE CBC W/AUTO DIFF WBC: CPT

## 2020-02-19 PROCEDURE — 83540 ASSAY OF IRON: CPT

## 2020-02-19 PROCEDURE — 97535 SELF CARE MNGMENT TRAINING: CPT

## 2020-02-19 PROCEDURE — 80076 HEPATIC FUNCTION PANEL: CPT

## 2020-02-19 PROCEDURE — 80048 BASIC METABOLIC PNL TOTAL CA: CPT

## 2020-02-19 PROCEDURE — 25000003 PHARM REV CODE 250: Performed by: INTERNAL MEDICINE

## 2020-02-19 PROCEDURE — 86140 C-REACTIVE PROTEIN: CPT

## 2020-02-19 PROCEDURE — 86644 CMV ANTIBODY: CPT

## 2020-02-19 PROCEDURE — 84100 ASSAY OF PHOSPHORUS: CPT

## 2020-02-19 PROCEDURE — 93750 INTERROGATION VAD IN PERSON: CPT | Mod: ,,, | Performed by: INTERNAL MEDICINE

## 2020-02-19 PROCEDURE — 63600175 PHARM REV CODE 636 W HCPCS: Performed by: INTERNAL MEDICINE

## 2020-02-19 PROCEDURE — 20600001 HC STEP DOWN PRIVATE ROOM

## 2020-02-19 PROCEDURE — 63600175 PHARM REV CODE 636 W HCPCS: Performed by: HOSPITALIST

## 2020-02-19 PROCEDURE — 93750 PR INTERROGATE VENT ASSIST DEV, IN PERSON, W PHYSICIAN ANALYSIS: ICD-10-PCS | Mod: ,,, | Performed by: INTERNAL MEDICINE

## 2020-02-19 RX ORDER — HYDROCODONE BITARTRATE AND ACETAMINOPHEN 500; 5 MG/1; MG/1
TABLET ORAL
Status: DISCONTINUED | OUTPATIENT
Start: 2020-02-19 | End: 2020-02-27 | Stop reason: HOSPADM

## 2020-02-19 RX ORDER — WARFARIN 7.5 MG/1
7.5 TABLET ORAL DAILY
Status: DISCONTINUED | OUTPATIENT
Start: 2020-02-19 | End: 2020-02-23

## 2020-02-19 RX ADMIN — CHLOROTHIAZIDE SODIUM 250 MG: 500 INJECTION, POWDER, LYOPHILIZED, FOR SOLUTION INTRAVENOUS at 09:02

## 2020-02-19 RX ADMIN — HYDRALAZINE HYDROCHLORIDE 25 MG: 25 TABLET, FILM COATED ORAL at 05:02

## 2020-02-19 RX ADMIN — Medication 10 ML: at 11:02

## 2020-02-19 RX ADMIN — ASPIRIN 325 MG ORAL TABLET 325 MG: 325 PILL ORAL at 09:02

## 2020-02-19 RX ADMIN — DOCUSATE SODIUM 100 MG: 100 CAPSULE, LIQUID FILLED ORAL at 09:02

## 2020-02-19 RX ADMIN — SODIUM CHLORIDE 250 MG: 9 INJECTION, SOLUTION INTRAVENOUS at 06:02

## 2020-02-19 RX ADMIN — BENZONATATE 100 MG: 100 CAPSULE ORAL at 05:02

## 2020-02-19 RX ADMIN — PANTOPRAZOLE SODIUM 40 MG: 40 TABLET, DELAYED RELEASE ORAL at 09:02

## 2020-02-19 RX ADMIN — BENZONATATE 100 MG: 100 CAPSULE ORAL at 09:02

## 2020-02-19 RX ADMIN — Medication 10 ML: at 05:02

## 2020-02-19 RX ADMIN — HEPARIN SODIUM AND DEXTROSE 12 UNITS/KG/HR: 10000; 5 INJECTION INTRAVENOUS at 02:02

## 2020-02-19 RX ADMIN — Medication 800 MG: at 09:02

## 2020-02-19 RX ADMIN — AMLODIPINE BESYLATE 10 MG: 10 TABLET ORAL at 09:02

## 2020-02-19 RX ADMIN — DOBUTAMINE IN DEXTROSE 2.5 MCG/KG/MIN: 200 INJECTION, SOLUTION INTRAVENOUS at 02:02

## 2020-02-19 RX ADMIN — Medication 800 MG: at 08:02

## 2020-02-19 RX ADMIN — POTASSIUM CHLORIDE 40 MEQ: 1500 TABLET, EXTENDED RELEASE ORAL at 10:02

## 2020-02-19 RX ADMIN — CHLOROTHIAZIDE SODIUM 500 MG: 500 INJECTION, POWDER, LYOPHILIZED, FOR SOLUTION INTRAVENOUS at 06:02

## 2020-02-19 RX ADMIN — FERROUS GLUCONATE TAB 324 MG (37.5 MG ELEMENTAL IRON) 324 MG: 324 (37.5 FE) TAB at 10:02

## 2020-02-19 RX ADMIN — WARFARIN SODIUM 7.5 MG: 7.5 TABLET ORAL at 04:02

## 2020-02-19 RX ADMIN — Medication 800 MG: at 04:02

## 2020-02-19 RX ADMIN — HYDRALAZINE HYDROCHLORIDE 25 MG: 25 TABLET, FILM COATED ORAL at 04:02

## 2020-02-19 RX ADMIN — POTASSIUM CHLORIDE 40 MEQ: 1500 TABLET, EXTENDED RELEASE ORAL at 08:02

## 2020-02-19 RX ADMIN — HYDRALAZINE HYDROCHLORIDE 25 MG: 25 TABLET, FILM COATED ORAL at 09:02

## 2020-02-19 RX ADMIN — DOCUSATE SODIUM 100 MG: 100 CAPSULE, LIQUID FILLED ORAL at 08:02

## 2020-02-19 RX ADMIN — FUROSEMIDE 10 MG/HR: 10 INJECTION, SOLUTION INTRAMUSCULAR; INTRAVENOUS at 09:02

## 2020-02-19 NOTE — PT/OT/SLP PROGRESS
"Speech Language Pathology Treatment & Discharge Summary    Patient Name:  Saba Lott   MRN:  [id number]  Admitting Diagnosis: LVAD (left ventricular assist device) present    Recommendations:                 General Recommendations:  Follow-up not indicated  Diet recommendations:  Regular, Liquid Diet Level: Thin   Aspiration Precautions: 1 bite/sip at a time, Avoid talking while eating, Eliminate distractions, Feed only when awake/alert, Frequent oral care, HOB to 90 degrees, Meds whole 1 at a time and Remain upright 30 minutes post meal   General Precautions: Standard, aspiration, fall, LVAD, sternal  Communication strategies:  none    Subjective     SLP reviewed Pt with RN, RN reported Pt medications whole all at once with thin liquids  Pt presents calm  He explains, "I'll try a cracker"  He denies pain     Pain/Comfort:  · Pain Rating 1: 0/10    Objective:     Has the patient been evaluated by SLP for swallowing?   Yes  Keep patient NPO? No   Current Respiratory Status: room air      Pt seen for dysphagia therapy. He was found awake and alert, reclined in chair in room with central line, LVAD, and PICC in place.  His mother was in the room with him t/o the session. SLP noted partially completed lunch meal tray in front of him on beside table. CNA in room to assess vitals and Pt repositioned in chair to upright position. Pt endorsed a good appetite. He denied difficulty with meal tray items for dental soft textures and thin liquids. He demonstrated occasional dry cough with mildly strained vocal quality and adequate vocal intensity appreciated at the conversational level. He was seen with trials of thin liquids (cup edge sips x3, straw sips x3) and solids (bites of saltine crackers x4. ) No difficulty with trials of thin liquids or solids. Pt was noted to pace bites and size of sips I'ly. RN in room during session to provide mediations. Pt observed with medications taken whole one at a time with straw sips thin " liquids x2 presented by RN. No overt S/S aspiration noted with whole pills one at a time.  Pt and Mother were educated on SLP's role,  diet recommendations, aspiration precautions, and plans to d/c ST at this time with option to re-consult should status change. Both verbalized understanding. RN notified of findings upon SLP exit. Pt remained in chair with call light in reach upon SLP exit from room.     Assessment:     Saba Lott is a 54 y.o. male with an SLP diagnosis of resolving dysphonia and dysphagia s/p extubation. .  He presents with tolerance of thin liquids and regular textures. No additional ST warranted at this time. Please reconsult should status change. .    Goals:   Multidisciplinary Problems     SLP Goals     Not on file          Multidisciplinary Problems (Resolved)        Problem: SLP Goal    Goal Priority Disciplines Outcome   SLP Goal   (Resolved)     SLP Met          Problem: SLP Goal    Goal Priority Disciplines Outcome   SLP Goal   (Resolved)     SLP Met   Description:  Speech Language Pathology Goals  Goals expected to be met by 2/18/2020  1. Pt will tolerate regular diet with thin liquids w/o overt S/S aspiration, MOD I  2. Educate Pt and family on S/S aspiration and aspiration precautions                        Plan:     · Patient to be seen:  4 x/week   · Plan of Care expires:  03/12/20  · Plan of Care reviewed with:  patient, mother   · SLP Follow-Up:  No       Discharge recommendations:  (no additional ST warranted )     Time Tracking:     SLP Treatment Date:   02/19/20  Speech Start Time:  1202  Speech Stop Time:  1219     Speech Total Time (min):  17 min    Billable Minutes: Treatment Swallowing Dysfunction 9 and Seld Care/Home Management Training 8    LARISSA Doyle., [facility]-SLP  Speech-Language Pathology  Pager: [phone]      02/19/2020

## 2020-02-19 NOTE — PT/OT/SLP PROGRESS
Occupational Therapy   Treatment    Name: Saba Lott  MRN: [id number]  Admitting Diagnosis:  LVAD (left ventricular assist device) present  12 Days Post-Op    Recommendations:     Discharge Recommendations: home health PT, home health OT  Discharge Equipment Recommendations:  (TBD )  Barriers to discharge:  None    Assessment:     Saba Lott is a 54 y.o. male with a medical diagnosis of LVAD (left ventricular assist device) present.  He presents with the following performance deficits affecting function: weakness, impaired endurance, impaired cardiopulmonary response to activity, impaired self care skills, impaired balance, orthopedic precautions, decreased safety awareness. Pt found on battery power seated in bedside chair asleep upon OT's arrival. Pt cooperate and agreeable to OT session. Pt tolerated session well this date with no reports of dizziness or pain with prolonged functional mobility. Pt deferred standing ADLs this date 2/2 completing them prior to OT's arrival. Pt responding well to therapeutic intervention noted in his progress thus far. POC decreased to 4x/wk. Goals re-assessed. Continue POC.    Rehab Prognosis:  Good; patient would benefit from acute skilled OT services to address these deficits and reach maximum level of function.       Plan:     Patient to be seen 4 x/week to address the above listed problems via self-care/home management, therapeutic activities, therapeutic exercises  · Plan of Care Expires: 03/11/20  · Plan of Care Reviewed with: patient, mother    Subjective     Pain/Comfort:  · Pain Rating 1: 0/10  · Pain Rating Post-Intervention 1: 0/10    Objective:     Communicated with: RN prior to session.  Patient found up in chair with PICC line, LVAD, telemetry upon OT entry to room.    General Precautions: Standard, fall, sternal, aspiration, LVAD   Orthopedic Precautions:N/A   Braces: N/A     Occupational Performance:     Functional Mobility/Transfers:  · Patient completed Sit <>  Stand Transfer from chair with moderate assistance  with  no assistive device  · Cuing to remain sternal precautions provided; pt utilized pillow during transfer    · Functional Mobility: Pt ambulated ~200 ft with platform rolling walker with chair follow. No LOB noted. No RBs required.  · Normal pace maintained throughout  · Proper breathing technique utilized   · Upright posture maintained     Activities of Daily Living:  · Feeding:  set up assistance to grasp cup from tray, bring to mouth, and drink seated in chair      AMPA 6 Click ADL: 18    Treatment & Education:  - Role of OT/ OT POC  - Self care safety/ independence  - Functional transfer/ mobility safety  - Importance of sitting UIC throughout the day  - Energy conservation techniques such as pacing and taking rest breaks as needed    ·  LVAD:   · Pt completed AM self-test prior to therapy session and educated on purpose of completing self-test. Pt documented all VAD numbers in daily log prior to therapy session and educated on purpose of recording numbers in binder.  · Education provided on naming of LVAD components: controller, power module, power cables, driveline, emergency bag, and consolidation pouch/vest.  · Pt educated on battery rotations and found to have correct batteries ready for use prior to therapy session.   · Pt educated on proper positioning of driveline when wearing consolidation bag.  · Pt verbalized 5/5 contents within emergency bag with increased time. Emergency bag checked by therapist.       Patient left up in chair with all lines intact, call button in reach, RN notified and mother presentEducation:      GOALS:   Multidisciplinary Problems     Occupational Therapy Goals        Problem: Occupational Therapy Goal    Goal Priority Disciplines Outcome Interventions   Occupational Therapy Goal     OT, PT/OT Ongoing, Progressing    Description:  Goals to be met by: 2/24/20     Patient will increase functional independence with ADLs by  performing:    Feeding with [place].  UE Dressing with Supervision.  LE Dressing with Supervision.  Grooming while standing at sink with Supervision.- Initiated   Toileting from toilet with Supervision for hygiene and clothing management.   Toilet transfer to toilet with Supervision.  Pt will be (I) with VAD management during ADL. - progressing               Multidisciplinary Problems (Resolved)        Problem: Occupational Therapy Goal    Goal Priority Disciplines Outcome Interventions   Occupational Therapy Goal   (Resolved)     OT, PT/OT Met    Description:  Goals to be met by: 7 days 1/30/20     Patient will increase functional independence with ADLs by performing:    Pt to complete UE dressing with set-up-MET  Pt to complete LE dressing with SBA with AE-MET   Pt to complete toileting with supervision.--MET  Pt to complete standing g/h skills with supervision.--MET  Pt to complete t/f to commode, bed and chair with SBA--MET                         Time Tracking:     OT Date of Treatment: 02/19/20  OT Start Time: 1110  OT Stop Time: 1136  OT Total Time (min): 26 min    Billable Minutes:Therapeutic Activity 26    Yarelis Castellano, MISA  2/19/2020

## 2020-02-19 NOTE — PLAN OF CARE
Problem: SLP Goal  Goal: SLP Goal  Description  Speech Language Pathology Goals  Goals expected to be met by 2/18/2020  1. Pt will tolerate regular diet with thin liquids w/o overt S/S aspiration, MOD I  2. Educate Pt and family on S/S aspiration and aspiration precautions       Outcome: Met     Pt tolerated trials of regular textures w/o overt S/S aspiration. REC: regular diet with thin liquids, medications whole one at a time, provided standard aspiration precautions. No additional ST warranted at this time. ST to d/c therapy. Please re-consult should status change. Thank you.    LARISSA Doyle., [facility]-SLP  Speech-Language Pathology  Pager: [phone]  2/19/2020

## 2020-02-19 NOTE — PLAN OF CARE
Problem: Occupational Therapy Goal  Goal: Occupational Therapy Goal  Description  G/oals to be met by: 2/24/20     Patient will increase functional independence with ADLs by performing:    Feeding with [place].  UE Dressing with Supervision.  LE Dressing with Supervision.  Grooming while standing at sink with Supervision.- Initiated   Toileting from toilet with Supervision for hygiene and clothing management.   Toilet transfer to toilet with Supervision.  Pt will be (I) with VAD management during ADL. - progressing    Outcome: Ongoing, Progressing     Pt progressing well towards OT goals. POC adjusted. No goals met this date. Goals remains appropriate for patient on this date. Pt will continue to benefit from skilled OT to address the deficits affecting his occupational performance.    Yarelis Castellano OTR/L  2/19/20

## 2020-02-19 NOTE — NURSING
Pt sitting up in chair with mom at bedside. Reviewed controller, VAD parameters meaning, driveline, power/tether cable, controller buttons, battery life and charging. Pt completed pages 1-10 in workbook.    Pt is progressing well with education. Encouraged pt to continue being involved with education and care. Pt states that he enjoyed watching the [employer] DVD instead of reading. I restarted the DVD for patient and asked him to complete pages 11-21 in the workbook.,

## 2020-02-19 NOTE — PROGRESS NOTES
Anna, Pt's mother, completed sterile kit LVAD dressing change for the first time tonight. DLES is a '2', suture intact, no drainage, redness, or tenderness to site. RN instructed pt's mother on the steps of the dressing change before and during the dressing change. She did very well and was able to maintain sterile technique throughout the entire dressing change. Ms. Castillo was provided  LVAD kit dressing change instructions, an extra pair of sterile gloves, and a LVAD kit to practice and familiarize herself with the contents inside. She reports that she will not be here tomorrow night and will need to complete the LVAD dressing change tomorrow during the day, will report off to the day shift RN.

## 2020-02-19 NOTE — ASSESSMENT & PLAN NOTE
-HeartMate 3 Implanted 02/06/2020 as DT. Chest closed 2/7.   -HTS Primary.  -Implanted by Dr. Joshi.  -INR sub therapeutic Coumadin, Goal INR 2.0-3.0. Cotninue heparin/warfarin.  -Antiplatelets  mg.  -LDH is stable overall today. Will continue to monitor daily.  -CVP 16. Continues on  2.5mcg/kg/mn and lasix 10mg/hr. Also getting diuril 250mg daily.   -BP: Receiving amlodipine 10mg daily and hydralazine 25mg Q8H.   -Speed set at 5300.  -Echo 2/12: LVIDD 6.19, TAPSE 0.81, Mild MR, AV opens intermittently. The ventricular septum is at midline. Will repeat.  -Not listed for OHTx.    Procedure: Device Interrogation Including analysis of device parameters.  Current Settings: Ventricular Assist Device.  Review of device function is stable/unstable stable.    TXP LVAD INTERROGATIONS 2/19/2020 2/18/2020 2/18/2020 2/18/2020 2/18/2020 2/18/2020 2/18/2020   Type HeartMate3 HeartMate3 HeartMate3 - HeartMate3 HeartMate3 HeartMate3   Flow 4.8 4.6 4.7 4.8 4.8 4.6 4   Speed 5300 5300 5300 5300 5300 5300 5300   PI 3.3 3.3 3.3 3.2 3.2 3.3 3.4   Power (Centeno) 3.9 3.9 3.8 3.9 4.0 3.8 3.8   LSL 4900 4900 4900 - - 4900 4900   Pulsatility - - Intermittent pulse - - - Intermittent pulse

## 2020-02-19 NOTE — PROGRESS NOTES
02/19/2020  Yudith Mcmanus    Current provider:  Fernandez Gr Jr.,*      I, Yudith Mcmanus, rounded on Saba Lott to ensure all mechanical assist device settings (IABP or VAD) were appropriate and all parameters were within limits.  I was able to ensure all back up equipment was present, the staff had no issues, and the Perfusion Department daily rounding was complete.    11:51 AM

## 2020-02-19 NOTE — CARE UPDATE
BG goal 140-180  BG consistently below goal ranges with no prn insulin requirements. Tolerating diet well.     Discontinue Low Dose Correction Scale and BG monitoring  Recommend continuing to monitor BG with am labs.    Discharge recs:  Lab Results   Component Value Date    HGBA1C 6.6 (H) 01/16/2020     Discussed with patient dietary and lifestyle changes. Recommend patient have repeat a1c drawn in three months.     Endocrine will sign off at this time. Please reconsult if needed.

## 2020-02-19 NOTE — PROGRESS NOTES
Pt and mom AAAO.  Pt lying flat on his recliner with lights off.  Turned on lights and assisted him to sit up and explained he needs to get on battery and write his numbers down.  His mother was supporting me and telling him she agreed.  I assisted them with rotating the batteries.  Pt was able to untether without assistance or instructions.  Further VAD education required.

## 2020-02-19 NOTE — NURSING
"Pt lying in bed with mom at bedside. Pt has still not started on his HM3 workbook; pt states "I will do it today." I, again, reiterated to Mr. Lott that he will not be discharged home if his education is not completed. Pt verbalizes understanding.    I verbally reviewed pt's controller and batteries with pt. Pt understands what is controller, batteries/clips and how the pump works.    I left pt sitting up in his chair with mom at bedside and call light within reach. I started HM3 DVD for pt to watch.   "

## 2020-02-19 NOTE — SUBJECTIVE & OBJECTIVE
Interval History: Slept a little better last night. He states that he still had some dry coughing last night but it has improved from prior night. Jesusita removed yesterday with no issues. States that he is eating well and having BMs.     Continuous Infusions:   DOBUTamine 2.5 mcg/kg/min (02/18/20 1259)    furosemide (LASIX) 2 mg/mL continuous infusion (non-titrating) 10 mg/hr (02/17/20 1700)    heparin (porcine) in D5W 12 Units/kg/hr (02/18/20 1920)     Scheduled Meds:   amLODIPine  10 mg Oral Daily    aspirin  325 mg Per NG tube Daily    chlorothiazide (DIURIL) IVPB  250 mg Intravenous Q24H    docusate sodium  100 mg Oral BID    ferrous gluconate  324 mg Oral Daily with breakfast    hydrALAZINE  25 mg Oral Q8H    magnesium oxide  800 mg Oral TID    pantoprazole  40 mg Oral Daily    polyethylene glycol  17 g Oral Daily    potassium chloride  40 mEq Oral BID    sodium chloride 0.9%  10 mL Intravenous Q6H    warfarin  10 mg Oral Daily     PRN Meds:acetaminophen, albuterol sulfate, benzonatate, bisacodyL, Dextrose 10% Bolus, Dextrose 10% Bolus, Dextrose 10% Bolus, Dextrose 10% Bolus, Dextrose 10% Bolus, Dextrose 10% Bolus, diphenhydrAMINE, glucagon (human recombinant), glucose, glucose, insulin aspart U-100, magnesium hydroxide 400 mg/5 ml, oxyCODONE, oxyCODONE, Flushing PICC Protocol **AND** sodium chloride 0.9% **AND** sodium chloride 0.9%    Review of patient's allergies indicates:   Allergen Reactions    Iodine and iodide containing products      Objective:     Vital Signs (Most Recent):  Temp: 98.5 °F (36.9 °C) (02/19/20 0405)  Pulse: 105 (02/19/20 0739)  Resp: 18 (02/19/20 0405)  BP: (!) 82/0 (02/19/20 0405)  SpO2: 95 % (02/19/20 0405) Vital Signs (24h Range):  Temp:  [98 °F (36.7 °C)-98.6 °F (37 °C)] 98.5 °F (36.9 °C)  Pulse:  [103-111] 105  Resp:  [16-20] 18  SpO2:  [93 %-98 %] 95 %  BP: (76-92)/(0-63) 82/0     Patient Vitals for the past 72 hrs (Last 3 readings):   Weight   02/19/20 0500 80.6 kg  (177 lb 11.1 oz)   02/17/20 0713 89 kg (196 lb 3.4 oz)     Body mass index is 27.83 kg/m².      Intake/Output Summary (Last 24 hours) at 2/19/2020 0746  Last data filed at 2/19/2020 0500  Gross per 24 hour   Intake 1480 ml   Output 1775 ml   Net -295 ml     Physical Exam   Constitutional: He is oriented to person, place, and time. He appears well-developed and well-nourished.   HENT:   Head: Normocephalic and atraumatic.   Cardiovascular: Normal rate and regular rhythm.   VAD hum.    Pulmonary/Chest: Effort normal. No respiratory distress.   Abdominal: Soft.   Neurological: He is alert and oriented to person, place, and time.   Skin: Skin is warm and dry.   Psychiatric: He has a normal mood and affect. His behavior is normal.   Nursing note and vitals reviewed.    Significant Labs:  CBC:  Recent Labs   Lab 02/17/20  0347 02/18/20  0559 02/19/20  0411   WBC 21.32* 19.92* 19.10*  19.10*  19.10*   RBC 2.84* 2.62* 2.54*  2.54*  2.54*   HGB 7.0* 6.8* 6.5*  6.5*  6.5*   HCT 23.9* 21.3* 20.7*  20.7*  20.7*   * 397* 466*  466*  466*   MCV 84 81* 82  82  82   MCH 24.6* 26.0* 25.6*  25.6*  25.6*   MCHC 29.3* 31.9* 31.4*  31.4*  31.4*     BNP:  Recent Labs   Lab 02/14/20  0400 02/17/20  0347 02/19/20  0411   * 637* 755*     CMP:  Recent Labs   Lab 02/16/20  0300  02/17/20  0347  02/17/20  2344 02/18/20  0559 02/19/20  0411   *  156*   < > 166*  166*   < > 150* 163* 109   CALCIUM 9.0  9.0   < > 9.0  9.0   < > 9.0 8.8 8.5*   ALBUMIN 2.4*  --  2.4*  2.4*  --   --   --  2.4*   PROT 7.7  --  7.9  7.9  --   --   --  7.4     141   < > 135*  135*   < > 130* 132* 133*   K 3.9  3.9  3.9   < > 4.7  4.7   < > 3.9 3.8 4.2   CO2 32*  32*   < > 33*  33*   < > 32* 33* 30*   CL 97  97   < > 93*  93*   < > 89* 91* 92*   BUN 37*  37*   < > 30*  30*   < > 30* 27* 5*   CREATININE 1.2  1.2   < > 1.1  1.1   < > 1.2 1.0 1.1   ALKPHOS 117  --  118  118  --   --   --  121   ALT 23  --  26   26  --   --   --  23   AST 25  --  30  30  --   --   --  26   BILITOT 0.7  --  0.6  0.6  --   --   --  0.5    < > = values in this interval not displayed.      Coagulation:   Recent Labs   Lab 02/17/20 0347 02/18/20  0559 02/18/20  1517 02/19/20  0238 02/19/20  0411   INR 1.5*  --  1.5*  --   --  1.8*  1.8*   APTT  --    < > 55.3* 35.2* 37.6* 39.1*  39.1*  39.1*    < > = values in this interval not displayed.     LDH:  Recent Labs   Lab 02/17/20 0347 02/18/20  0559 02/19/20  0411   * 407* 372*     Microbiology:  Microbiology Results (last 7 days)     ** No results found for the last 168 hours. **        I have reviewed all pertinent labs within the past 24 hours.    Estimated Creatinine Clearance: 77.2 mL/min (based on SCr of 1.1 mg/dL).    Diagnostic Results:  I have reviewed and interpreted all pertinent imaging results/findings within the past 24 hours.

## 2020-02-19 NOTE — PLAN OF CARE
Goals reviewed and remain appropriate. Pt progressing towards goals.    Pat Montgomery, PT, DPT   2020  [phone]    Problem: Physical Therapy Goal  Goal: Physical Therapy Goal  Description  Goals to be met by: 3/9/2020     Patient will increase functional independence with mobility by performin. Supine to sit with CGA- not met  2. Sit to stand transfer with Supervision- not met  3. Gait  x 150 feet with Supervision - not met          Outcome: Ongoing, Progressing

## 2020-02-19 NOTE — PROGRESS NOTES
SW to pt's room x2 this morning for update.  Pt is s/p LVAD implant on 2/6.  On first attempt, nursing at bedside for pt care.  On second attempt, PT at bedside working with pt.  SW following and remains available.

## 2020-02-19 NOTE — PLAN OF CARE
Pt free of falls/trauma/injuries.  Denies c/o SOB; O2Sats remain stable on room air.  Incentive spirometry encouraged throughout shift.  Incisional pain managed with PO analgesics.  Generalized skin remains CDI; 2+ edema noted to BLEs.  TEDs applied / in place.Incisions remain KUSHAL, CDI.  Pt being diuresed with lasix gtt; diuresing well.   Wt trending down.  Electrolytes replaced as ordered.  PT/OT following; pt able to ambulate in hallway with 2 person assist and walker. Continues on sternal precautions. 1 Unit of blood transfused today tolerated well given ferratin as well.  Mother did LVAD dressing this afternoon did well with steps and sterile tech. Will do again tomorrow afternoon.   Plan to continue with post-op care.  Pt tolerating plan of care.

## 2020-02-19 NOTE — PROGRESS NOTES
[facility]  Heart Transplant  Progress Note    Patient Name: Saba Lott  MRN: [id number]  Admission Date: 1/15/2020  Hospital Length of Stay: 35 days  Attending Physician: Fernandez Gr Jr.,*  Primary Care Provider: Primary Doctor No  Principal Problem:LVAD (left ventricular assist device) present    Subjective:     Interval History: Slept a little better last night. He states that he still had some dry coughing last night but it has improved from prior night. Mc removed yesterday with no issues. States that he is eating well and having BMs.     Continuous Infusions:   DOBUTamine 2.5 mcg/kg/min (02/18/20 1259)    furosemide (LASIX) 2 mg/mL continuous infusion (non-titrating) 10 mg/hr (02/17/20 1700)    heparin (porcine) in D5W 12 Units/kg/hr (02/18/20 1920)     Scheduled Meds:   amLODIPine  10 mg Oral Daily    aspirin  325 mg Per NG tube Daily    chlorothiazide (DIURIL) IVPB  250 mg Intravenous Q24H    docusate sodium  100 mg Oral BID    ferrous gluconate  324 mg Oral Daily with breakfast    hydrALAZINE  25 mg Oral Q8H    magnesium oxide  800 mg Oral TID    pantoprazole  40 mg Oral Daily    polyethylene glycol  17 g Oral Daily    potassium chloride  40 mEq Oral BID    sodium chloride 0.9%  10 mL Intravenous Q6H    warfarin  10 mg Oral Daily     PRN Meds:acetaminophen, albuterol sulfate, benzonatate, bisacodyL, Dextrose 10% Bolus, Dextrose 10% Bolus, Dextrose 10% Bolus, Dextrose 10% Bolus, Dextrose 10% Bolus, Dextrose 10% Bolus, diphenhydrAMINE, glucagon (human recombinant), glucose, glucose, insulin aspart U-100, magnesium hydroxide 400 mg/5 ml, oxyCODONE, oxyCODONE, Flushing PICC Protocol **AND** sodium chloride 0.9% **AND** sodium chloride 0.9%    Review of patient's allergies indicates:   Allergen Reactions    Iodine and iodide containing products      Objective:     Vital Signs (Most Recent):  Temp: 98.5 °F (36.9 °C) (02/19/20 0405)  Pulse: 105 (02/19/20 0739)  Resp:  18 (02/19/20 0405)  BP: (!) 82/0 (02/19/20 0405)  SpO2: 95 % (02/19/20 0405) Vital Signs (24h Range):  Temp:  [98 °F (36.7 °C)-98.6 °F (37 °C)] 98.5 °F (36.9 °C)  Pulse:  [103-111] 105  Resp:  [16-20] 18  SpO2:  [93 %-98 %] 95 %  BP: (76-92)/(0-63) 82/0     Patient Vitals for the past 72 hrs (Last 3 readings):   Weight   02/19/20 0500 80.6 kg (177 lb 11.1 oz)   02/17/20 0713 89 kg (196 lb 3.4 oz)     Body mass index is 27.83 kg/m².      Intake/Output Summary (Last 24 hours) at 2/19/2020 0746  Last data filed at 2/19/2020 0500  Gross per 24 hour   Intake 1480 ml   Output 1775 ml   Net -295 ml     Physical Exam   Constitutional: He is oriented to person, place, and time. He appears well-developed and well-nourished.   HENT:   Head: Normocephalic and atraumatic.   Cardiovascular: Normal rate and regular rhythm.   VAD hum.    Pulmonary/Chest: Effort normal. No respiratory distress.   Abdominal: Soft.   Neurological: He is alert and oriented to person, place, and time.   Skin: Skin is warm and dry.   Psychiatric: He has a normal mood and affect. His behavior is normal.   Nursing note and vitals reviewed.    Significant Labs:  CBC:  Recent Labs   Lab 02/17/20  0347 02/18/20  0559 02/19/20  0411   WBC 21.32* 19.92* 19.10*  19.10*  19.10*   RBC 2.84* 2.62* 2.54*  2.54*  2.54*   HGB 7.0* 6.8* 6.5*  6.5*  6.5*   HCT 23.9* 21.3* 20.7*  20.7*  20.7*   * 397* 466*  466*  466*   MCV 84 81* 82  82  82   MCH 24.6* 26.0* 25.6*  25.6*  25.6*   MCHC 29.3* 31.9* 31.4*  31.4*  31.4*     BNP:  Recent Labs   Lab 02/14/20  0400 02/17/20  0347 02/19/20  0411   * 637* 755*     CMP:  Recent Labs   Lab 02/16/20  0300  02/17/20  0347  02/17/20  2344 02/18/20  0559 02/19/20  0411   *  156*   < > 166*  166*   < > 150* 163* 109   CALCIUM 9.0  9.0   < > 9.0  9.0   < > 9.0 8.8 8.5*   ALBUMIN 2.4*  --  2.4*  2.4*  --   --   --  2.4*   PROT 7.7  --  7.9  7.9  --   --   --  7.4     141   < > 135*  135*    < > 130* 132* 133*   K 3.9  3.9  3.9   < > 4.7  4.7   < > 3.9 3.8 4.2   CO2 32*  32*   < > 33*  33*   < > 32* 33* 30*   CL 97  97   < > 93*  93*   < > 89* 91* 92*   BUN 37*  37*   < > 30*  30*   < > 30* 27* 5*   CREATININE 1.2  1.2   < > 1.1  1.1   < > 1.2 1.0 1.1   ALKPHOS 117  --  118  118  --   --   --  121   ALT 23  --  26  26  --   --   --  23   AST 25  --  30  30  --   --   --  26   BILITOT 0.7  --  0.6  0.6  --   --   --  0.5    < > = values in this interval not displayed.      Coagulation:   Recent Labs   Lab 02/17/20  0347  02/18/20  0559 02/18/20  1517 02/19/20  0238 02/19/20  0411   INR 1.5*  --  1.5*  --   --  1.8*  1.8*   APTT  --    < > 55.3* 35.2* 37.6* 39.1*  39.1*  39.1*    < > = values in this interval not displayed.     LDH:  Recent Labs   Lab 02/17/20 0347 02/18/20  0559 02/19/20  0411   * 407* 372*     Microbiology:  Microbiology Results (last 7 days)     ** No results found for the last 168 hours. **        I have reviewed all pertinent labs within the past 24 hours.    Estimated Creatinine Clearance: 77.2 mL/min (based on SCr of 1.1 mg/dL).    Diagnostic Results:  I have reviewed and interpreted all pertinent imaging results/findings within the past 24 hours.    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 Chronic MARC - Class II-III and mild LE edema.      Hospital Course (synopsis of major diagnoses, care, treatment, and services provided during the course of the hospital stay):  -2/12 admit to CDU for diuresis with IV lasix  -IV abx   -CXR with suspected small left pleural effusion with associated left basilar atelectasis versus evolving airspace disease  -2/13 single chamber ICD implant; IV lasix decreased to BID  -2/16 add dobutamine, hold BB due to hypotension, no ACE-I  or ARB due to recent HPI hypotension  -2/17 Lasix changed to PO  -2/18 dobutamine discontinued    Admitted to [facility] on Thursday due to severe SOB for a week with associated orthopnea, MARC, and PND unable to lie flat and found to be in acute diastolic heart failure. States he normally weighs around 219 but up to 254lb on admission tonight. Says he hasn't been the most compliant in terms of fluid restriction and daily weights but has avoided salt. BNP on admission was 2375. BUN/CRT 32/1.4 He was started on IV diuretics but continued to deteriorate. Creatinine continued to increase and reached 4.3 with oliguria. He was started on CRRT for a few days and tolerated well. Renal u/s was negative for obstruction and liver u/s without findings of cirrhosis. All of this was progression of cardiorenal syndrome with liver congestion from severe volume overload. On arrival vitals stable and in no acute distress. He has obvious anasarca up to the chest. He did have uop of 500 at time of arrival also.    TTE 5/28/19  · Moderate left ventricular enlargement.  · Severely decreased left ventricular systolic function. The estimated ejection fraction is 20%  · Global hypokinetic wall motion.  · Grade III (severe) left ventricular diastolic dysfunction consistent with restrictive physiology.  · Severe left atrial enlargement.  · Moderate right ventricular enlargement.  · Low normal right ventricular systolic function.  · Mild right atrial enlargement.  · Moderate mitral regurgitation.  Mild to moderate tricuspid regurgitation    * LVAD (left ventricular assist device) present  -HeartMate 3 Implanted 02/06/2020 as DT. Chest closed 2/7.   -HTS Primary.  -Implanted by Dr. Joshi.  -INR sub therapeutic Coumadin, Goal INR 2.0-3.0. Continue heparin/warfarin.  -Antiplatelets  mg.  -LDH is stable overall today. Will continue to monitor daily.  -CVP 16. Continues on  2.5mcg/kg/mn and lasix 10mg/hr. Also getting diuril  250mg daily. Will increase diuril to 500mg BID.   -BP: Receiving amlodipine 10mg daily and hydralazine 25mg Q8H.   -Speed set at 5300.  -Echo 2/12: LVIDD 6.19, TAPSE 0.81, Mild MR, AV opens intermittently. The ventricular septum is at midline. Will repeat.  -Not listed for OHTx.    Procedure: Device Interrogation Including analysis of device parameters.  Current Settings: Ventricular Assist Device.  Review of device function is stable/unstable stable.    TXP LVAD INTERROGATIONS 2/19/2020 2/18/2020 2/18/2020 2/18/2020 2/18/2020 2/18/2020 2/18/2020   Type HeartMate3 HeartMate3 HeartMate3 - HeartMate3 HeartMate3 HeartMate3   Flow 4.8 4.6 4.7 4.8 4.8 4.6 4   Speed 5300 5300 5300 5300 5300 5300 5300   PI 3.3 3.3 3.3 3.2 3.2 3.3 3.4   Power (Centeno) 3.9 3.9 3.8 3.9 4.0 3.8 3.8   LSL 4900 4900 4900 - - 4900 4900   Pulsatility - - Intermittent pulse - - - Intermittent pulse       ANJELICA (acute kidney injury)  -Creatinine was 3.0 on admit and got as high as 4.3 at OSH prior to transfer. Renal function was normal 8 months ago.  -Cr has since normalized.     Leukocytosis  -Afebrile.   -Trending down slowly.     Acute hyperglycemia  -HgA1C 6.6  -Endocrine following    Moderate malnutrition  - Boost added 1/27. Dr Joshi advised pt to not consume.   - Last Prealbumin 13.    Morbid obesity  -Weight down considerably since admit with diuresis.    Cardiogenic shock  -NICM; Likely Etoh induced  -Transferred from [facility] with IABP at 1:1 for further level of care. IABP removed 1/25 and replaced 2/3.   -S/P HMIII on 2/3.     Deep vein thrombosis (DVT) of right lower extremity  -Unsure of timing but was on eliquis PTA.   -Will anticoag with heparin/warfarin post VAD.     AICD (automatic cardioverter/defibrillator) present  -Medtronic single chamber ICD placed 2019 for primary prevention    Mickey Rider NP  Heart Transplant  [facility]
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
## 2020-02-20 LAB
ANION GAP SERPL CALC-SCNC: 10 MMOL/L (ref 8–16)
APTT BLDCRRT: 43 SEC (ref 21–32)
APTT BLDCRRT: 43 SEC (ref 21–32)
BASOPHILS # BLD AUTO: 0.09 K/UL (ref 0–0.2)
BASOPHILS NFR BLD: 0.5 % (ref 0–1.9)
BUN SERPL-MCNC: 22 MG/DL (ref 6–20)
CALCIUM SERPL-MCNC: 9 MG/DL (ref 8.7–10.5)
CHLORIDE SERPL-SCNC: 92 MMOL/L (ref 95–110)
CO2 SERPL-SCNC: 31 MMOL/L (ref 23–29)
CREAT SERPL-MCNC: 0.9 MG/DL (ref 0.5–1.4)
DIFFERENTIAL METHOD: ABNORMAL
EOSINOPHIL # BLD AUTO: 0.4 K/UL (ref 0–0.5)
EOSINOPHIL NFR BLD: 2 % (ref 0–8)
ERYTHROCYTE [DISTWIDTH] IN BLOOD BY AUTOMATED COUNT: 19.6 % (ref 11.5–14.5)
EST. GFR  (AFRICAN AMERICAN): >60 ML/MIN/1.73 M^2
EST. GFR  (NON AFRICAN AMERICAN): >60 ML/MIN/1.73 M^2
GLUCOSE SERPL-MCNC: 100 MG/DL (ref 70–110)
HCT VFR BLD AUTO: 23.7 % (ref 40–54)
HGB BLD-MCNC: 7.1 G/DL (ref 14–18)
IMM GRANULOCYTES # BLD AUTO: 0.7 K/UL (ref 0–0.04)
IMM GRANULOCYTES NFR BLD AUTO: 3.9 % (ref 0–0.5)
INR PPP: 2 (ref 0.8–1.2)
LDH SERPL L TO P-CCNC: 382 U/L (ref 110–260)
LYMPHOCYTES # BLD AUTO: 2.2 K/UL (ref 1–4.8)
LYMPHOCYTES NFR BLD: 12.5 % (ref 18–48)
MAGNESIUM SERPL-MCNC: 2 MG/DL (ref 1.6–2.6)
MCH RBC QN AUTO: 25.1 PG (ref 27–31)
MCHC RBC AUTO-ENTMCNC: 30 G/DL (ref 32–36)
MCV RBC AUTO: 84 FL (ref 82–98)
MONOCYTES # BLD AUTO: 0.9 K/UL (ref 0.3–1)
MONOCYTES NFR BLD: 5.2 % (ref 4–15)
NEUTROPHILS # BLD AUTO: 13.5 K/UL (ref 1.8–7.7)
NEUTROPHILS NFR BLD: 75.9 % (ref 38–73)
NRBC BLD-RTO: 3 /100 WBC
PHOSPHATE SERPL-MCNC: 3.1 MG/DL (ref 2.7–4.5)
PLATELET # BLD AUTO: 539 K/UL (ref 150–350)
PMV BLD AUTO: 9.3 FL (ref 9.2–12.9)
POTASSIUM SERPL-SCNC: 3.8 MMOL/L (ref 3.5–5.1)
PROTHROMBIN TIME: 19.3 SEC (ref 9–12.5)
RBC # BLD AUTO: 2.83 M/UL (ref 4.6–6.2)
SODIUM SERPL-SCNC: 133 MMOL/L (ref 136–145)
WBC # BLD AUTO: 17.78 K/UL (ref 3.9–12.7)

## 2020-02-20 PROCEDURE — 25000003 PHARM REV CODE 250: Performed by: STUDENT IN AN ORGANIZED HEALTH CARE EDUCATION/TRAINING PROGRAM

## 2020-02-20 PROCEDURE — 63600175 PHARM REV CODE 636 W HCPCS: Performed by: STUDENT IN AN ORGANIZED HEALTH CARE EDUCATION/TRAINING PROGRAM

## 2020-02-20 PROCEDURE — 20600001 HC STEP DOWN PRIVATE ROOM

## 2020-02-20 PROCEDURE — 99900035 HC TECH TIME PER 15 MIN (STAT)

## 2020-02-20 PROCEDURE — 63600175 PHARM REV CODE 636 W HCPCS: Performed by: NURSE PRACTITIONER

## 2020-02-20 PROCEDURE — 85025 COMPLETE CBC W/AUTO DIFF WBC: CPT

## 2020-02-20 PROCEDURE — 80048 BASIC METABOLIC PNL TOTAL CA: CPT

## 2020-02-20 PROCEDURE — 25000003 PHARM REV CODE 250: Performed by: NURSE PRACTITIONER

## 2020-02-20 PROCEDURE — 94664 DEMO&/EVAL PT USE INHALER: CPT

## 2020-02-20 PROCEDURE — 99232 PR SUBSEQUENT HOSPITAL CARE,LEVL II: ICD-10-PCS | Mod: ,,, | Performed by: INTERNAL MEDICINE

## 2020-02-20 PROCEDURE — 25000003 PHARM REV CODE 250: Performed by: INTERNAL MEDICINE

## 2020-02-20 PROCEDURE — 97116 GAIT TRAINING THERAPY: CPT

## 2020-02-20 PROCEDURE — 63600175 PHARM REV CODE 636 W HCPCS: Performed by: PHYSICIAN ASSISTANT

## 2020-02-20 PROCEDURE — 85730 THROMBOPLASTIN TIME PARTIAL: CPT

## 2020-02-20 PROCEDURE — 97535 SELF CARE MNGMENT TRAINING: CPT

## 2020-02-20 PROCEDURE — A4216 STERILE WATER/SALINE, 10 ML: HCPCS | Performed by: STUDENT IN AN ORGANIZED HEALTH CARE EDUCATION/TRAINING PROGRAM

## 2020-02-20 PROCEDURE — 99232 SBSQ HOSP IP/OBS MODERATE 35: CPT | Mod: ,,, | Performed by: INTERNAL MEDICINE

## 2020-02-20 PROCEDURE — 84100 ASSAY OF PHOSPHORUS: CPT

## 2020-02-20 PROCEDURE — 27000685 HC LVAD KIT (30 DAY SUPPLY)

## 2020-02-20 PROCEDURE — 83735 ASSAY OF MAGNESIUM: CPT

## 2020-02-20 PROCEDURE — 27000248 HC VAD-ADDITIONAL DAY

## 2020-02-20 PROCEDURE — 94761 N-INVAS EAR/PLS OXIMETRY MLT: CPT

## 2020-02-20 PROCEDURE — 85610 PROTHROMBIN TIME: CPT

## 2020-02-20 PROCEDURE — 63600175 PHARM REV CODE 636 W HCPCS: Performed by: INTERNAL MEDICINE

## 2020-02-20 PROCEDURE — 97530 THERAPEUTIC ACTIVITIES: CPT

## 2020-02-20 PROCEDURE — 83615 LACTATE (LD) (LDH) ENZYME: CPT

## 2020-02-20 RX ORDER — FUROSEMIDE 10 MG/ML
80 INJECTION INTRAMUSCULAR; INTRAVENOUS ONCE
Status: COMPLETED | OUTPATIENT
Start: 2020-02-20 | End: 2020-02-20

## 2020-02-20 RX ADMIN — WARFARIN SODIUM 7.5 MG: 7.5 TABLET ORAL at 04:02

## 2020-02-20 RX ADMIN — Medication 800 MG: at 09:02

## 2020-02-20 RX ADMIN — DOBUTAMINE IN DEXTROSE 2.5 MCG/KG/MIN: 200 INJECTION, SOLUTION INTRAVENOUS at 06:02

## 2020-02-20 RX ADMIN — Medication 10 ML: at 06:02

## 2020-02-20 RX ADMIN — POTASSIUM CHLORIDE 40 MEQ: 1500 TABLET, EXTENDED RELEASE ORAL at 07:02

## 2020-02-20 RX ADMIN — HYDRALAZINE HYDROCHLORIDE 25 MG: 25 TABLET, FILM COATED ORAL at 01:02

## 2020-02-20 RX ADMIN — DOCUSATE SODIUM 100 MG: 100 CAPSULE, LIQUID FILLED ORAL at 07:02

## 2020-02-20 RX ADMIN — CHLOROTHIAZIDE SODIUM 500 MG: 500 INJECTION, POWDER, LYOPHILIZED, FOR SOLUTION INTRAVENOUS at 06:02

## 2020-02-20 RX ADMIN — SODIUM CHLORIDE 250 MG: 9 INJECTION, SOLUTION INTRAVENOUS at 12:02

## 2020-02-20 RX ADMIN — CHLOROTHIAZIDE SODIUM 500 MG: 500 INJECTION, POWDER, LYOPHILIZED, FOR SOLUTION INTRAVENOUS at 04:02

## 2020-02-20 RX ADMIN — FUROSEMIDE 80 MG: 10 INJECTION, SOLUTION INTRAMUSCULAR; INTRAVENOUS at 12:02

## 2020-02-20 RX ADMIN — SODIUM CHLORIDE 250 MG: 9 INJECTION, SOLUTION INTRAVENOUS at 09:02

## 2020-02-20 RX ADMIN — SODIUM CHLORIDE 250 MG: 9 INJECTION, SOLUTION INTRAVENOUS at 10:02

## 2020-02-20 RX ADMIN — FUROSEMIDE 10 MG/HR: 10 INJECTION, SOLUTION INTRAMUSCULAR; INTRAVENOUS at 06:02

## 2020-02-20 RX ADMIN — HYDRALAZINE HYDROCHLORIDE 25 MG: 25 TABLET, FILM COATED ORAL at 07:02

## 2020-02-20 RX ADMIN — AMLODIPINE BESYLATE 10 MG: 10 TABLET ORAL at 09:02

## 2020-02-20 RX ADMIN — DOCUSATE SODIUM 100 MG: 100 CAPSULE, LIQUID FILLED ORAL at 09:02

## 2020-02-20 RX ADMIN — PANTOPRAZOLE SODIUM 40 MG: 40 TABLET, DELAYED RELEASE ORAL at 09:02

## 2020-02-20 RX ADMIN — Medication 800 MG: at 03:02

## 2020-02-20 RX ADMIN — POTASSIUM CHLORIDE 40 MEQ: 1500 TABLET, EXTENDED RELEASE ORAL at 09:02

## 2020-02-20 RX ADMIN — Medication 800 MG: at 07:02

## 2020-02-20 RX ADMIN — ASPIRIN 325 MG ORAL TABLET 325 MG: 325 PILL ORAL at 09:02

## 2020-02-20 RX ADMIN — Medication 10 ML: at 04:02

## 2020-02-20 RX ADMIN — FUROSEMIDE 20 MG/HR: 10 INJECTION, SOLUTION INTRAMUSCULAR; INTRAVENOUS at 06:02

## 2020-02-20 RX ADMIN — HYDRALAZINE HYDROCHLORIDE 25 MG: 25 TABLET, FILM COATED ORAL at 04:02

## 2020-02-20 NOTE — ASSESSMENT & PLAN NOTE
-HeartMate 3 Implanted 02/06/2020 as DT. Chest closed 2/7.   -HTS Primary.  -Implanted by Dr. Joshi.  -Continue Coumadin, Goal INR 2.0-3.0. Cotninue warfarin.  -Antiplatelets  mg.  -LDH is stable overall today. Will continue to monitor daily.  -CVP 13. Continues on  2.5mcg/kg/mn and lasix 10mg/hr. Also getting diuril 500 mg daily. Bolus and increase lasix today per Dr Gr.  -BP: Receiving amlodipine 10mg daily and hydralazine 25mg Q8H.   -Speed set at 5300.  -Echo 2/12: LVIDD 6.19, TAPSE 0.81, Mild MR, AV opens intermittently. The ventricular septum is at midline. Repeat echo today stable from prior, will review with Dr Gr today.  -Not listed for OHTx.    Procedure: Device Interrogation Including analysis of device parameters.  Current Settings: Ventricular Assist Device.  Review of device function is stable/unstable stable.    TXP LVAD INTERROGATIONS 2/20/2020 2/19/2020 2/19/2020 2/19/2020 2/19/2020 2/19/2020 2/19/2020   Type HeartMate3 HeartMate3 HeartMate3 HeartMate3 HeartMate3 HeartMate3 HeartMate3   Flow 4.7 4.7 4.8 4.8 4.9 4.8 4.8   Speed 5300 5300 5300 5300 5300 5300 5300   PI 3.3 3.4 3.3 3.4 3.4 3.3 3.3   Power ([place]eno) 3.9 3.9 3.9 3.9 3.9 3.9 3.9   LSL 4900 4900 4900 4900 4900 4900 4900   Pulsatility Pulse - - - - - -

## 2020-02-20 NOTE — PROGRESS NOTES
02/20/2020  Miko Rivera    Current provider:  Fernandez Gr Jr.,*      I, Miko Rivera, rounded on Saba Lott to ensure all mechanical assist device settings (IABP or VAD) were appropriate and all parameters were within limits.  I was able to ensure all back up equipment was present, the staff had no issues, and the Perfusion Department daily rounding was complete.    9:49 AM

## 2020-02-20 NOTE — PLAN OF CARE
Patient remains free from fall and injury. Does not complain of any pain at this time. Has Lasix, Dobutamine, and Heparin infusing. Patient is diuresing and adhering to fluid restrictions. Bleeding precautions utilized. LVAD DLES dressing change to be performed by mother. VS WNL. Patient is on continuous cardiac monitoring. Bed is in lowest locked position. Call light within reach. Will continue to monitor.

## 2020-02-20 NOTE — PT/OT/SLP PROGRESS
Physical Therapy Treatment    Patient Name:  Saba Lott   MRN:  [id number]    Recommendations:     Discharge Recommendations:  home health PT   Discharge Equipment Recommendations: bedside commode     Assessment:     Saba Lott is a 54 y.o. male admitted with a medical diagnosis of LVAD (left ventricular assist device) present.  He presents with the following impairments/functional limitations:  weakness, gait instability, impaired functional mobilty, decreased upper extremity function, impaired cardiopulmonary response to activity.    Unable to assist with family training 2nd to mother not present during session. Did a gait trial without platform RW. Pt able to ambulate 200ft without an AD with SBA - CGA. Without an AD at this time, pt needs full attention on ambulation as pt is an increased fall risk with multitasking re: having a conversation, reading a sign, etc. Educated pt on importance of attention to ambulation when ambulating without a platform RW. Re educated pt that once sternal precautions are lifted, pt can return to using rollator. Pt reported that mother is buying him a lift chair for home.     Rehab Prognosis: Good; patient would benefit from acute skilled PT services to address these deficits and reach maximum level of function.    Recent Surgery: Procedure(s) (LRB):  CLOSURE, WOUND, STERNUM (N/A)  WASHOUT  INSERTION, GRAFT, PERICARDIUM  APPLICATION, WOUND VAC 13 Days Post-Op    Plan:     During this hospitalization, patient to be seen 5 x/week to address the identified rehab impairments via gait training, therapeutic activities, therapeutic exercises, neuromuscular re-education and progress toward the following goals:    · Plan of Care Expires:  03/13/20    Subjective     Chief Complaint: none reported   Patient/Family Comments/goals: to get better and return home   Pain/Comfort:  · Pain Rating 1: 0/10  · Pain Rating Post-Intervention 1: 0/10      Objective:     Communicated with RN  prior to  session and daughter and daughter's friend present in room.  Patient found up in chair with telemetry, PICC line, LVAD upon PT entry to room.     General Precautions: Standard, fall, LVAD, sternal   Orthopedic Precautions:N/A   Braces: N/A     Functional Mobility:  · Bed Mobility: not performed 2nd to pt found in chair   · Transfers:     · Sit to Stand:  moderate assistance with no AD from chair   · Gait: 200ft with no AD with SBA - CGA  · Emergency bag intact   · Pt demo'd inconsistent foot placement   · Cuing for attention to task and purposeful steps   · Pt not appropriate for multitasking at this time 2nd to increased fall risk       AM-PAC 6 CLICK MOBILITY  Turning over in bed (including adjusting bedclothes, sheets and blankets)?: 3  Sitting down on and standing up from a chair with arms (e.g., wheelchair, bedside commode, etc.): 2  Moving from lying on back to sitting on the side of the bed?: 2  Moving to and from a bed to a chair (including a wheelchair)?: 3  Need to walk in hospital room?: 3  Climbing 3-5 steps with a railing?: 2  Basic Mobility Total Score: 15       Therapeutic Activities and Exercises:  Educated pt on PT role/POC  Educated pt on importance of OOB activity and daily ambulation   Pt verbalized understanding     LVAD found and remained on battery power  No alarms sounded     Patient left up in chair with all lines intact, call button in reach, RN notified and daughter and daughter's friend present  present..    GOALS:   Multidisciplinary Problems     Physical Therapy Goals        Problem: Physical Therapy Goal    Goal Priority Disciplines Outcome Goal Variances Interventions   Physical Therapy Goal     PT, PT/OT Ongoing, Progressing     Description:  Goals to be met by: 3/9/2020     Patient will increase functional independence with mobility by performin. Supine to sit with CGA- not met  2. Sit to stand transfer with Supervision- not met  3. Gait  x 150 feet with Supervision - not  met                           Time Tracking:     PT Received On: 02/20/20  PT Start Time: 0951     PT Stop Time: 1002  PT Total Time (min): 11 min     Billable Minutes: Gait Training 11    Treatment Type: Treatment  PT/PTA: PT     PTA Visit Number: 0       Eva Garza PT, DPT  2/20/2020  [phone]

## 2020-02-20 NOTE — PT/OT/SLP PROGRESS
Occupational Therapy   Treatment    Name: Saba Lott  MRN: [id number]  Admitting Diagnosis:  LVAD (left ventricular assist device) present  13 Days Post-Op    Recommendations:     Discharge Recommendations: home health OT  Discharge Equipment Recommendations:  bedside commode  Barriers to discharge:  None    Assessment:     Saba Lott is a 54 y.o. male with a medical diagnosis of LVAD (left ventricular assist device) present.  He presents with the following performance deficits affecting function: weakness, impaired endurance, impaired self care skills, orthopedic precautions, decreased upper extremity function, impaired functional mobilty. Pt found on battery power upon OT's arrival. Pt cooperative and agreeable to today's session. Pt advanced to toilet transfer requiring mod A to ascend/descend from commode. Pt continues to require cuing to maintain sternal precautions during all transfers. Pt progressing well towards OT goals. Difficulty noted with LB dressing; collaborative plan to bring sock aid next session to assist with donning of socks. OT POC remains appropriate for patient on this date. Pt will continue to benefit from skilled OT to address the deficits affecting his functional performance.     Rehab Prognosis:  Good; patient would benefit from acute skilled OT services to address these deficits and reach maximum level of function.       Plan:     Patient to be seen 4 x/week to address the above listed problems via self-care/home management, therapeutic activities, therapeutic exercises  · Plan of Care Expires: 03/11/20  · Plan of Care Reviewed with: patient    Subjective     Pain/Comfort:  · Pain Rating 1: 0/10  · Pain Rating Post-Intervention 1: 0/10    Objective:     Communicated with: RN prior to session.  Patient found up in chair with telemetry, PICC line, LVAD upon OT entry to room.    General Precautions: Standard, fall, sternal, LVAD   Orthopedic Precautions:N/A   Braces: N/A     Occupational  Performance:     Functional Mobility/Transfers:  · Patient completed Sit <> Stand Transfer frombed with minimum assistance and of 2 persons  with  no assistive device   · Patient completed Toilet Transfer Step Transfer technique with moderate assistance with  no AD  · Pt ambulated to restroom with platform walker with SBA.  · Functional Mobility: Pt ambulated ~200 ft with platform walker requiring SBA-CGA for balance safety. No LOB noted. No RBs required.  · Normal pace maintained   · Improved postural control     Activities of Daily Living:  · Lower Body Dressing: maximal assistance to don LB socks seated in chair  · Decreased LB flexibility to perform figure 4 technique       [facility] 6 Click ADL: 18    Treatment & Education:  - Role of OT/ OT POC  - Self care safety/ independence  - Functional transfer/ mobility safety  - Energy conservation techniques such as pacing and taking rest breaks as needed  - Progress thus far  - Functional performance this date   - Sternal precautions re-iterated      LVAD:   · Pt completed AM self-test prior to therapy session and educated on purpose of completing self-test. Pt documented all VAD numbers in daily log prior to therapy session and educated on purpose of recording numbers in binder. Binder checked by OT.   · Pt educated on battery rotations and found to have correct batteries ready for use prior to therapy session.   · Pt educated on proper positioning of driveline when wearing consolidation bag.     · Pt educated on importance of checking anchor daily.   · Emergency bag present during all out of room activity.    Patient left up in chair with all lines intact, call button in reach and RN notifiedEducation:      GOALS:   Multidisciplinary Problems     Occupational Therapy Goals        Problem: Occupational Therapy Goal    Goal Priority Disciplines Outcome Interventions   Occupational Therapy Goal     OT, PT/OT Ongoing, Progressing    Description:  Goals to be met by:  2/24/20     Patient will increase functional independence with ADLs by performing:    Feeding with [place].  UE Dressing with Supervision.  LE Dressing with Supervision.- Initiated; will need sock aid   Grooming while standing at sink with Supervision.- Initiated   Toileting from toilet with Supervision for hygiene and clothing management.   Toilet transfer to toilet with Supervision.- progressing   Pt will be (I) with VAD management during ADL. - progressing                Multidisciplinary Problems (Resolved)        Problem: Occupational Therapy Goal    Goal Priority Disciplines Outcome Interventions   Occupational Therapy Goal   (Resolved)     OT, PT/OT Met    Description:  Goals to be met by: 7 days 1/30/20     Patient will increase functional independence with ADLs by performing:    Pt to complete UE dressing with set-up-MET  Pt to complete LE dressing with SBA with AE-MET   Pt to complete toileting with supervision.--MET  Pt to complete standing g/h skills with supervision.--MET  Pt to complete t/f to commode, bed and chair with SBA--MET                         Time Tracking:     OT Date of Treatment: 02/20/20  OT Start Time: 1103  OT Stop Time: 1135  OT Total Time (min): 32 min    Billable Minutes:Self Care/Home Management 12  Therapeutic Activity 20    Yarelis Castellano, OT  2/20/2020

## 2020-02-20 NOTE — PROGRESS NOTES
[facility]  Heart Transplant  Progress Note    Patient Name: Saba Lott  MRN: [id number]  Admission Date: 1/15/2020  Hospital Length of Stay: 36 days  Attending Physician: Fernandez Gr Jr.,*  Primary Care Provider: Primary Doctor No  Principal Problem:LVAD (left ventricular assist device) present    Subjective:     Interval History: No complaints, no events overnight. Feels generally well. Denies NVD, SOB, Chest pain. JVD up today from yesterday.    Continuous Infusions:   DOBUTamine 2.5 mcg/kg/min (02/19/20 1449)    furosemide (LASIX) 2 mg/mL continuous infusion (non-titrating) 10 mg/hr (02/20/20 0629)    heparin (porcine) in D5W 12 Units/kg/hr (02/19/20 1449)     Scheduled Meds:   amLODIPine  10 mg Oral Daily    aspirin  325 mg Per NG tube Daily    chlorothiazide (DIURIL) IVPB  500 mg Intravenous Q12H    docusate sodium  100 mg Oral BID    ferric gluconate (FERRLECIT) IVPB  250 mg Intravenous BID    ferrous gluconate  324 mg Oral Daily with breakfast    furosemide  80 mg Intravenous Once    hydrALAZINE  25 mg Oral Q8H    magnesium oxide  800 mg Oral TID    pantoprazole  40 mg Oral Daily    polyethylene glycol  17 g Oral Daily    potassium chloride  40 mEq Oral BID    sodium chloride 0.9%  10 mL Intravenous Q6H    warfarin  7.5 mg Oral Daily     PRN Meds:sodium chloride, acetaminophen, albuterol sulfate, benzonatate, bisacodyL, Dextrose 10% Bolus, Dextrose 10% Bolus, Dextrose 10% Bolus, Dextrose 10% Bolus, Dextrose 10% Bolus, Dextrose 10% Bolus, diphenhydrAMINE, magnesium hydroxide 400 mg/5 ml, oxyCODONE, oxyCODONE, Flushing PICC Protocol **AND** sodium chloride 0.9% **AND** sodium chloride 0.9%    Review of patient's allergies indicates:   Allergen Reactions    Iodine and iodide containing products      Objective:     Vital Signs (Most Recent):  Temp: 98.6 °F (37 °C) (02/20/20 0725)  Pulse: 104 (02/20/20 0927)  Resp: 18 (02/20/20 0927)  BP: (!) 76/0 (02/20/20  0725)  SpO2: 97 % (02/20/20 0927) Vital Signs (24h Range):  Temp:  [97.4 °F (36.3 °C)-98.9 °F (37.2 °C)] 98.6 °F (37 °C)  Pulse:  [] 104  Resp:  [14-18] 18  SpO2:  [94 %-100 %] 97 %  BP: ()/(0-71) 76/0     Patient Vitals for the past 72 hrs (Last 3 readings):   Weight   02/20/20 0600 79.5 kg (175 lb 2.5 oz)   02/19/20 1521 80.3 kg (177 lb)   02/19/20 0500 80.6 kg (177 lb 11.1 oz)     Body mass index is 27.43 kg/m².      Intake/Output Summary (Last 24 hours) at 2/20/2020 1139  Last data filed at 2/20/2020 0900  Gross per 24 hour   Intake 2484.25 ml   Output 4155 ml   Net -1670.75 ml     Physical Exam   Constitutional: He is oriented to person, place, and time. He appears well-developed and well-nourished.   HENT:   Head: Normocephalic and atraumatic.   Neck: JVD (upper 1/3 of jaw) present.   Cardiovascular: Normal rate and regular rhythm.   VAD hum.    Pulmonary/Chest: Effort normal. No respiratory distress.   Abdominal: Soft.   Musculoskeletal: He exhibits no edema.   Neurological: He is alert and oriented to person, place, and time.   Skin: Skin is warm and dry.   Psychiatric: He has a normal mood and affect. His behavior is normal.   Nursing note and vitals reviewed.    Significant Labs:  CBC:  Recent Labs   Lab 02/18/20  0559 02/19/20  0411 02/20/20  0459   WBC 19.92* 19.10*  19.10*  19.10* 17.78*   RBC 2.62* 2.54*  2.54*  2.54* 2.83*   HGB 6.8* 6.5*  6.5*  6.5* 7.1*   HCT 21.3* 20.7*  20.7*  20.7* 23.7*   * 466*  466*  466* 539*   MCV 81* 82  82  82 84   MCH 26.0* 25.6*  25.6*  25.6* 25.1*   MCHC 31.9* 31.4*  31.4*  31.4* 30.0*     BNP:  Recent Labs   Lab 02/14/20  0400 02/17/20  0347 02/19/20  0411   * 637* 755*     CMP:  Recent Labs   Lab 02/16/20  0300  02/17/20  0347  02/18/20  0559 02/19/20  0411 02/20/20  0459   *  156*   < > 166*  166*   < > 163* 109 100   CALCIUM 9.0  9.0   < > 9.0  9.0   < > 8.8 8.5* 9.0   ALBUMIN 2.4*  --  2.4*  2.4*  --   --  2.4*   --    PROT 7.7  --  7.9  7.9  --   --  7.4  --      141   < > 135*  135*   < > 132* 133* 133*   K 3.9  3.9  3.9   < > 4.7  4.7   < > 3.8 4.2 3.8   CO2 32*  32*   < > 33*  33*   < > 33* 30* 31*   CL 97  97   < > 93*  93*   < > 91* 92* 92*   BUN 37*  37*   < > 30*  30*   < > 27* 5* 22*   CREATININE 1.2  1.2   < > 1.1  1.1   < > 1.0 1.1 0.9   ALKPHOS 117  --  118  118  --   --  121  --    ALT 23  --  26  26  --   --  23  --    AST 25  --  30  30  --   --  26  --    BILITOT 0.7  --  0.6  0.6  --   --  0.5  --     < > = values in this interval not displayed.      Coagulation:   Recent Labs   Lab 02/18/20  0559  02/19/20  0238 02/19/20  0411 02/20/20  0459   INR 1.5*  --   --  1.8*  1.8* 2.0*   APTT 55.3*   < > 37.6* 39.1*  39.1*  39.1* 43.0*  43.0*    < > = values in this interval not displayed.     LDH:  Recent Labs   Lab 02/18/20  0559 02/19/20  0411 02/20/20  0459   * 372* 382*     Microbiology:  Microbiology Results (last 7 days)     ** No results found for the last 168 hours. **        I have reviewed all pertinent labs within the past 24 hours.    Estimated Creatinine Clearance: 93.8 mL/min (based on SCr of 0.9 mg/dL).    Diagnostic Results:  I have reviewed and interpreted all pertinent imaging results/findings within the past 24 hours.    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 Chronic MARC - Class II-III and mild LE edema.      Hospital Course (synopsis of major diagnoses, care, treatment, and services provided during the course of the hospital stay):  -2/12 admit to CDU for diuresis with IV lasix  -IV abx   -CXR with suspected small left pleural effusion with associated left basilar atelectasis versus evolving airspace disease  -2/13 single chamber ICD implant; IV lasix decreased to  BID  -2/16 add dobutamine, hold BB due to hypotension, no ACE-I or ARB due to recent HPI hypotension  -2/17 Lasix changed to PO  -2/18 dobutamine discontinued    Admitted to [facility] on Thursday due to severe SOB for a week with associated orthopnea, MARC, and PND unable to lie flat and found to be in acute diastolic heart failure. States he normally weighs around 219 but up to 254lb on admission tonight. Says he hasn't been the most compliant in terms of fluid restriction and daily weights but has avoided salt. BNP on admission was 2375. BUN/CRT 32/1.4 He was started on IV diuretics but continued to deteriorate. Creatinine continued to increase and reached 4.3 with oliguria. He was started on CRRT for a few days and tolerated well. Renal u/s was negative for obstruction and liver u/s without findings of cirrhosis. All of this was progression of cardiorenal syndrome with liver congestion from severe volume overload. On arrival vitals stable and in no acute distress. He has obvious anasarca up to the chest. He did have uop of 500 at time of arrival also.    TTE 5/28/19  · Moderate left ventricular enlargement.  · Severely decreased left ventricular systolic function. The estimated ejection fraction is 20%  · Global hypokinetic wall motion.  · Grade III (severe) left ventricular diastolic dysfunction consistent with restrictive physiology.  · Severe left atrial enlargement.  · Moderate right ventricular enlargement.  · Low normal right ventricular systolic function.  · Mild right atrial enlargement.  · Moderate mitral regurgitation.  Mild to moderate tricuspid regurgitation    * LVAD (left ventricular assist device) present  -HeartMate 3 Implanted 02/06/2020 as DT. Chest closed 2/7.   -HTS Primary.  -Implanted by Dr. Joshi.  -Continue Coumadin, Goal INR 2.0-3.0. Cotninue warfarin.  -Antiplatelets  mg.  -LDH is stable overall today. Will continue to monitor daily.  -CVP 13. Continues on   2.5mcg/kg/mn and lasix 10mg/hr. Also getting diuril 500 mg daily. Bolus and increase lasix today per Dr Gr.  -BP: Receiving amlodipine 10mg daily and hydralazine 25mg Q8H.   -Speed set at 5300.  -Echo 2/12: LVIDD 6.19, TAPSE 0.81, Mild MR, AV opens intermittently. The ventricular septum is at midline. Repeat echo today stable from prior, will review with Dr Gr today.  -Not listed for OHTx.    Procedure: Device Interrogation Including analysis of device parameters.  Current Settings: Ventricular Assist Device.  Review of device function is stable/unstable stable.    TXP LVAD INTERROGATIONS 2/20/2020 2/19/2020 2/19/2020 2/19/2020 2/19/2020 2/19/2020 2/19/2020   Type HeartMate3 HeartMate3 HeartMate3 HeartMate3 HeartMate3 HeartMate3 HeartMate3   Flow 4.7 4.7 4.8 4.8 4.9 4.8 4.8   Speed 5300 5300 5300 5300 5300 5300 5300   PI 3.3 3.4 3.3 3.4 3.4 3.3 3.3   Power (Centeno) 3.9 3.9 3.9 3.9 3.9 3.9 3.9   LSL 4900 4900 4900 4900 4900 4900 4900   Pulsatility Pulse - - - - - -       Anemia due to acute blood loss  - low iron stores, started IV iron 2/19  -S/P 1u prbc 2/19.     Leukocytosis  - Afebrile.   -Trending down slowly.     Acute hyperglycemia  -HgA1C 6.6  -Endocrine following    Moderate malnutrition  - Boost added 1/27. Dr Joshi advised pt to not consume.   - Last Prealbumin 13.    Morbid obesity  -Weight down considerably since admit with diuresis.  - Body mass index is 27.43 kg/m².      ANJELICA (acute kidney injury)  -Creatinine was 3.0 on admit and got as high as 4.3 at OSH prior to transfer. Renal function was normal 8 months ago.  -Cr has since normalized.     Cardiogenic shock  -NICM; Likely Etoh induced  -Transferred from [facility] with IABP at 1:1 for further level of care. IABP removed 1/25 and replaced 2/3.   -S/P HMIII on 2/3.     Deep vein thrombosis (DVT) of right lower extremity  -Unsure of timing but was on eliquis PTA.   -now on anticoagulation post LVAD    AICD (automatic  cardioverter/defibrillator) present  -Medtronic single chamber ICD placed 2019 for primary prevention        Shani Holliday PA-C  Heart Transplant  [facility]

## 2020-02-20 NOTE — PLAN OF CARE
Pt remains free from falls, trauma, and injuries. VS WNL. Patient denies c/o chest pain, SOB, and discomfort. Patient remains on room air; O2Sats stable. Generalized skin remains clean, dry, and intact. 2+ edema noted to BLEs, TEDs in place. Patient continues to diurese on lasix gtt; diuresing well. Patient is on dobutamine and diuril. PT/OT following; patient ambulated with assist times one. Continues on stearal precautions. Incentive Spirometry encouraged during shift. Plan to continue education and teaching on VAD care. Patient tolerating plan of care well.

## 2020-02-20 NOTE — ASSESSMENT & PLAN NOTE
-NICM; Likely Etoh induced  -Transferred from [facility] with IABP at 1:1 for further level of care. IABP removed 1/25 and replaced 2/3.   -S/P HMIII on 2/3.

## 2020-02-20 NOTE — PLAN OF CARE
Problem: Occupational Therapy Goal  Goal: Occupational Therapy Goal  Description  Goals to be met by: 2/24/20     Patient will increase functional independence with ADLs by performing:    Feeding with [place].  UE Dressing with Supervision.  LE Dressing with Supervision.- Initiated; will need sock aid   Grooming while standing at sink with Supervision.- Initiated   Toileting from toilet with Supervision for hygiene and clothing management.   Toilet transfer to toilet with Supervision.- progressing   Pt will be (I) with VAD management during ADL. - progressing     Outcome: Ongoing, Progressing     Pt progressing well towards OT goals. OT POC remains appropriate for patient on this date. Pt will continue to benefit from skilled OT to address the deficits affecting his occupational performance.     Yarelis Castellano OTR/L  2/20/20

## 2020-02-20 NOTE — SUBJECTIVE & OBJECTIVE
Interval History: No complaints, no events overnight. Feels generally well. Denies NVD, SOB, Chest pain. JVD up today from yesterday.    Continuous Infusions:   DOBUTamine 2.5 mcg/kg/min (02/19/20 1449)    furosemide (LASIX) 2 mg/mL continuous infusion (non-titrating) 10 mg/hr (02/20/20 0629)    heparin (porcine) in D5W 12 Units/kg/hr (02/19/20 1449)     Scheduled Meds:   amLODIPine  10 mg Oral Daily    aspirin  325 mg Per NG tube Daily    chlorothiazide (DIURIL) IVPB  500 mg Intravenous Q12H    docusate sodium  100 mg Oral BID    ferric gluconate (FERRLECIT) IVPB  250 mg Intravenous BID    ferrous gluconate  324 mg Oral Daily with breakfast    furosemide  80 mg Intravenous Once    hydrALAZINE  25 mg Oral Q8H    magnesium oxide  800 mg Oral TID    pantoprazole  40 mg Oral Daily    polyethylene glycol  17 g Oral Daily    potassium chloride  40 mEq Oral BID    sodium chloride 0.9%  10 mL Intravenous Q6H    warfarin  7.5 mg Oral Daily     PRN Meds:sodium chloride, acetaminophen, albuterol sulfate, benzonatate, bisacodyL, Dextrose 10% Bolus, Dextrose 10% Bolus, Dextrose 10% Bolus, Dextrose 10% Bolus, Dextrose 10% Bolus, Dextrose 10% Bolus, diphenhydrAMINE, magnesium hydroxide 400 mg/5 ml, oxyCODONE, oxyCODONE, Flushing PICC Protocol **AND** sodium chloride 0.9% **AND** sodium chloride 0.9%    Review of patient's allergies indicates:   Allergen Reactions    Iodine and iodide containing products      Objective:     Vital Signs (Most Recent):  Temp: 98.6 °F (37 °C) (02/20/20 0725)  Pulse: 104 (02/20/20 0927)  Resp: 18 (02/20/20 0927)  BP: (!) 76/0 (02/20/20 0725)  SpO2: 97 % (02/20/20 0927) Vital Signs (24h Range):  Temp:  [97.4 °F (36.3 °C)-98.9 °F (37.2 °C)] 98.6 °F (37 °C)  Pulse:  [] 104  Resp:  [14-18] 18  SpO2:  [94 %-100 %] 97 %  BP: ()/(0-71) 76/0     Patient Vitals for the past 72 hrs (Last 3 readings):   Weight   02/20/20 0600 79.5 kg (175 lb 2.5 oz)   02/19/20 1521 80.3 kg (177 lb)    02/19/20 0500 80.6 kg (177 lb 11.1 oz)     Body mass index is 27.43 kg/m².      Intake/Output Summary (Last 24 hours) at 2/20/2020 1139  Last data filed at 2/20/2020 0900  Gross per 24 hour   Intake 2484.25 ml   Output 4155 ml   Net -1670.75 ml     Physical Exam   Constitutional: He is oriented to person, place, and time. He appears well-developed and well-nourished.   HENT:   Head: Normocephalic and atraumatic.   Neck: JVD (upper 1/3 of jaw) present.   Cardiovascular: Normal rate and regular rhythm.   VAD hum.    Pulmonary/Chest: Effort normal. No respiratory distress.   Abdominal: Soft.   Musculoskeletal: He exhibits no edema.   Neurological: He is alert and oriented to person, place, and time.   Skin: Skin is warm and dry.   Psychiatric: He has a normal mood and affect. His behavior is normal.   Nursing note and vitals reviewed.    Significant Labs:  CBC:  Recent Labs   Lab 02/18/20  0559 02/19/20  0411 02/20/20  0459   WBC 19.92* 19.10*  19.10*  19.10* 17.78*   RBC 2.62* 2.54*  2.54*  2.54* 2.83*   HGB 6.8* 6.5*  6.5*  6.5* 7.1*   HCT 21.3* 20.7*  20.7*  20.7* 23.7*   * 466*  466*  466* 539*   MCV 81* 82  82  82 84   MCH 26.0* 25.6*  25.6*  25.6* 25.1*   MCHC 31.9* 31.4*  31.4*  31.4* 30.0*     BNP:  Recent Labs   Lab 02/14/20  0400 02/17/20  0347 02/19/20  0411   * 637* 755*     CMP:  Recent Labs   Lab 02/16/20  0300  02/17/20  0347  02/18/20  0559 02/19/20  0411 02/20/20  0459   *  156*   < > 166*  166*   < > 163* 109 100   CALCIUM 9.0  9.0   < > 9.0  9.0   < > 8.8 8.5* 9.0   ALBUMIN 2.4*  --  2.4*  2.4*  --   --  2.4*  --    PROT 7.7  --  7.9  7.9  --   --  7.4  --      141   < > 135*  135*   < > 132* 133* 133*   K 3.9  3.9  3.9   < > 4.7  4.7   < > 3.8 4.2 3.8   CO2 32*  32*   < > 33*  33*   < > 33* 30* 31*   CL 97  97   < > 93*  93*   < > 91* 92* 92*   BUN 37*  37*   < > 30*  30*   < > 27* 5* 22*   CREATININE 1.2  1.2   < > 1.1  1.1   < > 1.0  1.1 0.9   ALKPHOS 117  --  118  118  --   --  121  --    ALT 23  --  26  26  --   --  23  --    AST 25  --  30  30  --   --  26  --    BILITOT 0.7  --  0.6  0.6  --   --  0.5  --     < > = values in this interval not displayed.      Coagulation:   Recent Labs   Lab 02/18/20  0559  02/19/20  0238 02/19/20 0411 02/20/20  0459   INR 1.5*  --   --  1.8*  1.8* 2.0*   APTT 55.3*   < > 37.6* 39.1*  39.1*  39.1* 43.0*  43.0*    < > = values in this interval not displayed.     LDH:  Recent Labs   Lab 02/18/20  0559 02/19/20 0411 02/20/20  0459   * 372* 382*     Microbiology:  Microbiology Results (last 7 days)     ** No results found for the last 168 hours. **        I have reviewed all pertinent labs within the past 24 hours.    Estimated Creatinine Clearance: 93.8 mL/min (based on SCr of 0.9 mg/dL).    Diagnostic Results:  I have reviewed and interpreted all pertinent imaging results/findings within the past 24 hours.

## 2020-02-20 NOTE — PROGRESS NOTES
UPDATE    SW to pt's room for update this morning.  Pt is s/p LVAD implant on 2/6.  Pt presents as sitting up in bedside chair, aao x4, calm, cooperative, and asking and answering questions appropriately.  Pt reports he is feeling well today.  Pt states he is making progress with VAD education and states that his mother has been performing dressing change.  Pt states doctors have told him he may be able to d/c home soon.  SW discussed arranging HH with pt; pt in agreement with HH services being set up and denies preference for agency.  Pt reports coping adequately at this time, and denies any needs or concerns to SW.    SAMRA spoke with Itzel with [facility] ([id number]), who reports [facility] does not cover [place].  Per Ace [facility] covers [place] but does not accept [employer].  SAMRA spoke with [facility] (ph: [phone]), who reports they accept [employer] Medicare and requested that SW send referral so that they can verify pt's address and if they cover his area.  SW sent referral via [employer] and is awaiting response from [facility].  SW providing ongoing psychosocial and counseling support, education, resources, assistance, and discharge planning as indicated.  SW following and remains available.        ADDENDUM, 2/21 -- SAMRA spoke with [facility] intake, who reports they did not receive referral sent via [employer] yesterday.  SW faxed referral to [facility] (ph: [phone], f: [phone]) and is awaiting reply as to whether they can accept pt.

## 2020-02-21 LAB
ALBUMIN SERPL BCP-MCNC: 2.6 G/DL (ref 3.5–5.2)
ALP SERPL-CCNC: 142 U/L (ref 55–135)
ALT SERPL W/O P-5'-P-CCNC: 23 U/L (ref 10–44)
ANION GAP SERPL CALC-SCNC: 10 MMOL/L (ref 8–16)
APTT BLDCRRT: 40.4 SEC (ref 21–32)
AST SERPL-CCNC: 28 U/L (ref 10–40)
BASOPHILS # BLD AUTO: 0.06 K/UL (ref 0–0.2)
BASOPHILS NFR BLD: 0.3 % (ref 0–1.9)
BILIRUB DIRECT SERPL-MCNC: 0.4 MG/DL (ref 0.1–0.3)
BILIRUB SERPL-MCNC: 0.6 MG/DL (ref 0.1–1)
BNP SERPL-MCNC: 825 PG/ML (ref 0–99)
BUN SERPL-MCNC: 23 MG/DL (ref 6–20)
CALCIUM SERPL-MCNC: 9.3 MG/DL (ref 8.7–10.5)
CHLORIDE SERPL-SCNC: 90 MMOL/L (ref 95–110)
CO2 SERPL-SCNC: 34 MMOL/L (ref 23–29)
CREAT SERPL-MCNC: 1 MG/DL (ref 0.5–1.4)
CRP SERPL-MCNC: 87.6 MG/L (ref 0–8.2)
DIFFERENTIAL METHOD: ABNORMAL
EOSINOPHIL # BLD AUTO: 0.4 K/UL (ref 0–0.5)
EOSINOPHIL NFR BLD: 2.1 % (ref 0–8)
ERYTHROCYTE [DISTWIDTH] IN BLOOD BY AUTOMATED COUNT: 20 % (ref 11.5–14.5)
EST. GFR  (AFRICAN AMERICAN): >60 ML/MIN/1.73 M^2
EST. GFR  (NON AFRICAN AMERICAN): >60 ML/MIN/1.73 M^2
GLUCOSE SERPL-MCNC: 132 MG/DL (ref 70–110)
HCT VFR BLD AUTO: 25.6 % (ref 40–54)
HGB BLD-MCNC: 7.9 G/DL (ref 14–18)
IMM GRANULOCYTES # BLD AUTO: 0.69 K/UL (ref 0–0.04)
IMM GRANULOCYTES NFR BLD AUTO: 3.9 % (ref 0–0.5)
INR PPP: 2.1 (ref 0.8–1.2)
LDH SERPL L TO P-CCNC: 393 U/L (ref 110–260)
LYMPHOCYTES # BLD AUTO: 1.7 K/UL (ref 1–4.8)
LYMPHOCYTES NFR BLD: 9.3 % (ref 18–48)
MAGNESIUM SERPL-MCNC: 2 MG/DL (ref 1.6–2.6)
MCH RBC QN AUTO: 25.8 PG (ref 27–31)
MCHC RBC AUTO-ENTMCNC: 30.9 G/DL (ref 32–36)
MCV RBC AUTO: 84 FL (ref 82–98)
MONOCYTES # BLD AUTO: 1 K/UL (ref 0.3–1)
MONOCYTES NFR BLD: 5.6 % (ref 4–15)
NEUTROPHILS # BLD AUTO: 14.1 K/UL (ref 1.8–7.7)
NEUTROPHILS NFR BLD: 78.8 % (ref 38–73)
NRBC BLD-RTO: 4 /100 WBC
PHOSPHATE SERPL-MCNC: 3.5 MG/DL (ref 2.7–4.5)
PLATELET # BLD AUTO: 714 K/UL (ref 150–350)
PMV BLD AUTO: 9 FL (ref 9.2–12.9)
POTASSIUM SERPL-SCNC: 3.6 MMOL/L (ref 3.5–5.1)
PROT SERPL-MCNC: 8.2 G/DL (ref 6–8.4)
PROTHROMBIN TIME: 19.7 SEC (ref 9–12.5)
RBC # BLD AUTO: 3.06 M/UL (ref 4.6–6.2)
SODIUM SERPL-SCNC: 134 MMOL/L (ref 136–145)
WBC # BLD AUTO: 17.83 K/UL (ref 3.9–12.7)

## 2020-02-21 PROCEDURE — 83615 LACTATE (LD) (LDH) ENZYME: CPT

## 2020-02-21 PROCEDURE — 93750 PR INTERROGATE VENT ASSIST DEV, IN PERSON, W PHYSICIAN ANALYSIS: ICD-10-PCS | Mod: ,,, | Performed by: INTERNAL MEDICINE

## 2020-02-21 PROCEDURE — 85610 PROTHROMBIN TIME: CPT

## 2020-02-21 PROCEDURE — 25000003 PHARM REV CODE 250: Performed by: INTERNAL MEDICINE

## 2020-02-21 PROCEDURE — 84100 ASSAY OF PHOSPHORUS: CPT

## 2020-02-21 PROCEDURE — 99233 SBSQ HOSP IP/OBS HIGH 50: CPT | Mod: GC,,, | Performed by: NURSE PRACTITIONER

## 2020-02-21 PROCEDURE — 93750 INTERROGATION VAD IN PERSON: CPT | Mod: ,,, | Performed by: INTERNAL MEDICINE

## 2020-02-21 PROCEDURE — 63600175 PHARM REV CODE 636 W HCPCS: Performed by: NURSE PRACTITIONER

## 2020-02-21 PROCEDURE — 63600175 PHARM REV CODE 636 W HCPCS: Performed by: INTERNAL MEDICINE

## 2020-02-21 PROCEDURE — 99900035 HC TECH TIME PER 15 MIN (STAT)

## 2020-02-21 PROCEDURE — 94664 DEMO&/EVAL PT USE INHALER: CPT

## 2020-02-21 PROCEDURE — 86140 C-REACTIVE PROTEIN: CPT

## 2020-02-21 PROCEDURE — 80076 HEPATIC FUNCTION PANEL: CPT

## 2020-02-21 PROCEDURE — 25000003 PHARM REV CODE 250: Performed by: STUDENT IN AN ORGANIZED HEALTH CARE EDUCATION/TRAINING PROGRAM

## 2020-02-21 PROCEDURE — 63600175 PHARM REV CODE 636 W HCPCS: Performed by: PHYSICIAN ASSISTANT

## 2020-02-21 PROCEDURE — 25000003 PHARM REV CODE 250: Performed by: NURSE PRACTITIONER

## 2020-02-21 PROCEDURE — 83735 ASSAY OF MAGNESIUM: CPT

## 2020-02-21 PROCEDURE — 27000248 HC VAD-ADDITIONAL DAY

## 2020-02-21 PROCEDURE — 20600001 HC STEP DOWN PRIVATE ROOM

## 2020-02-21 PROCEDURE — 80048 BASIC METABOLIC PNL TOTAL CA: CPT

## 2020-02-21 PROCEDURE — 83880 ASSAY OF NATRIURETIC PEPTIDE: CPT

## 2020-02-21 PROCEDURE — 85025 COMPLETE CBC W/AUTO DIFF WBC: CPT

## 2020-02-21 PROCEDURE — 94668 MNPJ CHEST WALL SBSQ: CPT

## 2020-02-21 PROCEDURE — 99232 SBSQ HOSP IP/OBS MODERATE 35: CPT | Mod: ,,, | Performed by: INTERNAL MEDICINE

## 2020-02-21 PROCEDURE — A4216 STERILE WATER/SALINE, 10 ML: HCPCS | Performed by: STUDENT IN AN ORGANIZED HEALTH CARE EDUCATION/TRAINING PROGRAM

## 2020-02-21 PROCEDURE — 63600175 PHARM REV CODE 636 W HCPCS: Mod: JG | Performed by: STUDENT IN AN ORGANIZED HEALTH CARE EDUCATION/TRAINING PROGRAM

## 2020-02-21 PROCEDURE — 94761 N-INVAS EAR/PLS OXIMETRY MLT: CPT

## 2020-02-21 PROCEDURE — 99233 PR SUBSEQUENT HOSPITAL CARE,LEVL III: ICD-10-PCS | Mod: GC,,, | Performed by: NURSE PRACTITIONER

## 2020-02-21 PROCEDURE — 97116 GAIT TRAINING THERAPY: CPT

## 2020-02-21 PROCEDURE — 85730 THROMBOPLASTIN TIME PARTIAL: CPT

## 2020-02-21 PROCEDURE — 99232 PR SUBSEQUENT HOSPITAL CARE,LEVL II: ICD-10-PCS | Mod: ,,, | Performed by: INTERNAL MEDICINE

## 2020-02-21 RX ORDER — SPIRONOLACTONE 25 MG/1
25 TABLET ORAL DAILY
Status: DISCONTINUED | OUTPATIENT
Start: 2020-02-21 | End: 2020-02-27 | Stop reason: HOSPADM

## 2020-02-21 RX ORDER — ACETAMINOPHEN 500 MG
1000 TABLET ORAL ONCE
Status: COMPLETED | OUTPATIENT
Start: 2020-02-21 | End: 2020-02-21

## 2020-02-21 RX ORDER — HYDRALAZINE HYDROCHLORIDE 25 MG/1
50 TABLET, FILM COATED ORAL EVERY 8 HOURS
Status: DISCONTINUED | OUTPATIENT
Start: 2020-02-21 | End: 2020-02-24

## 2020-02-21 RX ADMIN — AMLODIPINE BESYLATE 10 MG: 10 TABLET ORAL at 08:02

## 2020-02-21 RX ADMIN — Medication 800 MG: at 08:02

## 2020-02-21 RX ADMIN — POTASSIUM CHLORIDE 40 MEQ: 1500 TABLET, EXTENDED RELEASE ORAL at 08:02

## 2020-02-21 RX ADMIN — WARFARIN SODIUM 7.5 MG: 7.5 TABLET ORAL at 05:02

## 2020-02-21 RX ADMIN — Medication 10 ML: at 07:02

## 2020-02-21 RX ADMIN — SODIUM CHLORIDE 250 MG: 9 INJECTION, SOLUTION INTRAVENOUS at 10:02

## 2020-02-21 RX ADMIN — FUROSEMIDE 20 MG/HR: 10 INJECTION, SOLUTION INTRAMUSCULAR; INTRAVENOUS at 02:02

## 2020-02-21 RX ADMIN — DOCUSATE SODIUM 100 MG: 100 CAPSULE, LIQUID FILLED ORAL at 08:02

## 2020-02-21 RX ADMIN — Medication 800 MG: at 03:02

## 2020-02-21 RX ADMIN — HYDRALAZINE HYDROCHLORIDE 50 MG: 25 TABLET, FILM COATED ORAL at 09:02

## 2020-02-21 RX ADMIN — CHLOROTHIAZIDE SODIUM 500 MG: 500 INJECTION, POWDER, LYOPHILIZED, FOR SOLUTION INTRAVENOUS at 06:02

## 2020-02-21 RX ADMIN — Medication 10 ML: at 01:02

## 2020-02-21 RX ADMIN — PANTOPRAZOLE SODIUM 40 MG: 40 TABLET, DELAYED RELEASE ORAL at 08:02

## 2020-02-21 RX ADMIN — FUROSEMIDE 20 MG/HR: 10 INJECTION, SOLUTION INTRAMUSCULAR; INTRAVENOUS at 04:02

## 2020-02-21 RX ADMIN — ACETAMINOPHEN 1000 MG: 500 TABLET ORAL at 06:02

## 2020-02-21 RX ADMIN — SPIRONOLACTONE 25 MG: 25 TABLET ORAL at 08:02

## 2020-02-21 RX ADMIN — ASPIRIN 325 MG ORAL TABLET 325 MG: 325 PILL ORAL at 08:02

## 2020-02-21 RX ADMIN — HYDRALAZINE HYDROCHLORIDE 50 MG: 25 TABLET, FILM COATED ORAL at 01:02

## 2020-02-21 RX ADMIN — ALTEPLASE 2 MG: 2.2 INJECTION, POWDER, LYOPHILIZED, FOR SOLUTION INTRAVENOUS at 11:02

## 2020-02-21 RX ADMIN — HYDRALAZINE HYDROCHLORIDE 25 MG: 25 TABLET, FILM COATED ORAL at 06:02

## 2020-02-21 NOTE — ASSESSMENT & PLAN NOTE
-HeartMate 3 Implanted 02/06/2020 as DT. Chest closed 2/7.   -HTS Primary.  -Implanted by Dr. Joshi.  -Continue Coumadin, Goal INR 2.0-3.0.  -Antiplatelets  mg.  -LDH is stable overall today. Will continue to monitor daily.  -CVP 13. Continues on  2.5mcg/kg/mn and lasix 20mg/hr. Also getting IV diuril 500 mg bid.   -BP: Doppler/MAP goal 60-90. Continue Norvasc 10 mg qd. Increase Hydralazine to 50 mg every 8 hours and add Aldactone 25 mg qd  -Speed set at 5300.  -Echo 2/19 at 5300 rpm: LVIDD 6.2, TAPSE 1.26, Mild MR, AV opens intermittently. The ventricular septum is at midline. Moderate anterior pericardial effusion reported. This echo was reviewed by Dr. Gr  -Not listed for OHTx.  -PT/OT/VAD education    Procedure: Device Interrogation Including analysis of device parameters.  Current Settings: Ventricular Assist Device.  Review of device function is stable/unstable stable.    TXP LVAD INTERROGATIONS 2/20/2020 2/20/2020 2/20/2020 2/20/2020 2/19/2020 2/19/2020 2/19/2020   Type HeartMate3 HeartMate3 HeartMate3 HeartMate3 HeartMate3 HeartMate3 HeartMate3   Flow 4.7 4.8 4.8 4.7 4.7 4.8 4.8   Speed 5300 5300 5300 5300 5300 5300 5300   PI 3.3 3.3 3.3 3.3 3.4 3.3 3.4   Power (Centeno) 3.9 3.9 3.9 3.9 3.9 3.9 3.9   LSL 4900 4900 4900 4900 4900 4900 4900   Pulsatility Intermittent pulse Intermittent pulse Intermittent pulse Pulse - - -

## 2020-02-21 NOTE — PT/OT/SLP PROGRESS
Occupational Therapy      Patient Name:  Saba Lott   MRN:  [id number]    OT visited pt this date to address needs and concerns and to provide an update on POC. During visit, OT educated pt on writing in binder and recording values daily along with performing self test before performing morning routine throughout the weekend. Daily walks with RN encouraged. Pt verbalized understanding. Pt remains appropriate for skilled OT services. OT to follow up on 2/24/20.     Yarelis Castellano, OT  2/21/2020

## 2020-02-21 NOTE — PROGRESS NOTES
02/21/2020  Yudith Mcmanus    Current provider:  Fernandez Gr Jr.,*      I, Yudith Mcmanus, rounded on Saba Lott to ensure all mechanical assist device settings (IABP or VAD) were appropriate and all parameters were within limits.  I was able to ensure all back up equipment was present, the staff had no issues, and the Perfusion Department daily rounding was complete.    10:56 AM       Additional Safety/Bands:

## 2020-02-21 NOTE — NURSING
LVAD dressing change done using sterile tech by RN. No redness or drainage, sutures intact at driveline site. Pt tolerated well and was able to voice the correct steps to the dressing change. Mother will return to hospital tomorrow to do another dressing chance. Midsternal dressing change also done. Small amount of serous/sangious drainage noted at top of incision. Less then the day before. Pt denies any pain at the site and incision edges are approximated. Pt tolerated well.

## 2020-02-21 NOTE — PLAN OF CARE
Pt free of falls/trauma/injuries.  Denies c/o SOB; O2Sats remain stable on room air.  Incentive spirometry encouraged throughout shift.  Incisional pain managed with PO analgesics.  Generalized skin remains CDI; 2+ edema noted to BLEs.  TEDs applied / in place.  Incisions remain KUSHAL, CDI.  Pt being diuresed with lasix; diuresing well.   Wt trending down remains stable.  Electrolytes replaced as ordered.  Middle of the sternal dressing change done. Small amount of sanguinous drainage noted at the top of site. Pt tolerated well. PT/OT following; pt able to ambulate in hallway with 1-person assist. Plan to continue with post-op care.  Pt tolerating plan of care.

## 2020-02-21 NOTE — PLAN OF CARE
Problem: Physical Therapy Goal  Goal: Physical Therapy Goal  Description  Goals to be met by: 3/9/2020     Patient will increase functional independence with mobility by performin. Supine to sit with CGA- not met  2. Sit to stand transfer with Supervision- not met  3. Gait  x 150 feet with Supervision - not met          Outcome: Ongoing, Progressing   Goals remain appropriate. Christa Malik, PT  2020

## 2020-02-21 NOTE — PT/OT/SLP PROGRESS
Physical Therapy Treatment    Patient Name:  Saba Lott   MRN:  [id number]    Recommendations:     Discharge Recommendations:  home health PT   Discharge Equipment Recommendations: bedside commode   Barriers to discharge: None    Assessment:     Saba Lott is a 54 y.o. male admitted with a medical diagnosis of LVAD (left ventricular assist device) present.  He presents with the following impairments/functional limitations:  impaired functional mobilty, gait instability, impaired endurance, decreased lower extremity function, impaired balance. pt krista treatment better being able to gait train farther. Pt will benefit from cont skilled PT 5x/wk to progress physically. Pt will be able to discharge home with HHPT and BSC when medically stable. Pt is s/p LVAD HM 3 placement 2/6 with chest closure etc 2/8/20.    Rehab Prognosis: Good; patient would benefit from acute skilled PT services to address these deficits and reach maximum level of function.    Recent Surgery: Procedure(s) (LRB):  CLOSURE, WOUND, STERNUM (N/A)  WASHOUT  INSERTION, GRAFT, PERICARDIUM  APPLICATION, WOUND VAC 14 Days Post-Op    Plan:     During this hospitalization, patient to be seen 5 x/week to address the identified rehab impairments via gait training, therapeutic activities, therapeutic exercises and progress toward the following goals:    · Plan of Care Expires:  03/13/20    Subjective     Chief Complaint: pt stated that he was tired.   Patient/Family Comments/goals:  To get better and go home.   Pain/Comfort:  · Pain Rating 1: 0/10  · Pain Rating Post-Intervention 1: 0/10      Objective:     Communicated with nurse prior to session.  Patient found reclined in chair. with telemetry, PICC line, LVAD upon PT entry to room. [employer] camera in room.    General Precautions: Standard, fall, LVAD, sternal   Orthopedic Precautions:N/A   Braces:       Functional Mobility:  · Transfers:   Pt needed verbal cues for functional mobility with sternal  precautions.  · Sit to Stand:  maximal assistance with hand-held assist  ·   · Gait: pt received gait training ~ 280 ft with CGA and emergency bag present.     Pt was SBA switching from LVAD battery to/from power base and using consolidation bag.       AM-PAC 6 CLICK MOBILITY  Turning over in bed (including adjusting bedclothes, sheets and blankets)?: 3  Sitting down on and standing up from a chair with arms (e.g., wheelchair, bedside commode, etc.): 2  Moving from lying on back to sitting on the side of the bed?: 3  Moving to and from a bed to a chair (including a wheelchair)?: 3  Need to walk in hospital room?: 3  Climbing 3-5 steps with a railing?: 2  Basic Mobility Total Score: 16       Therapeutic Activities and Exercises:   pt received verbal instructions in role of PT and POC. Pt was instructed to stay up in chair most of day and get RN staff to assist with back to bed. Pt verbally expressed understanding of such.     Patient left up in chair with all lines intact and call button in reach. [employer] camera in place.     GOALS:   Multidisciplinary Problems     Physical Therapy Goals        Problem: Physical Therapy Goal    Goal Priority Disciplines Outcome Goal Variances Interventions   Physical Therapy Goal     PT, PT/OT Ongoing, Progressing     Description:  Goals to be met by: 3/9/2020     Patient will increase functional independence with mobility by performin. Supine to sit with CGA- not met  2. Sit to stand transfer with Supervision- not met  3. Gait  x 150 feet with Supervision - not met                           Time Tracking:     PT Received On: 20  PT Start Time: 1020     PT Stop Time: 1039  PT Total Time (min): 19 min     Billable Minutes: Gait Training 19 min    Treatment Type: Treatment  PT/PTA: PT     PTA Visit Number: 0     Christa Malik, PT  2020

## 2020-02-21 NOTE — PLAN OF CARE
Patient remains free from fall and injury. Does not complain of any pain at this time. Has Lasix, Dobutamine, and Heparin infusing. Patient is diuresing and adhering to fluid restrictions. Bleeding precautions utilized. LVAD DLES dressing change to be performed 02/21/2020. VS WNL. Patient is on continuous cardiac monitoring. Bed is in lowest locked position. Call light within reach. Will continue to monitor.

## 2020-02-21 NOTE — PROGRESS NOTES
Patient complains of headache 8/10. Physician notified. Instructed to order and administer one time dose Tylenol 1000 mg PO. No further orders given at this time. Will continue to monitor.

## 2020-02-21 NOTE — SUBJECTIVE & OBJECTIVE
**Interval History: Sitting up in chair. No complaints. Progressing with PT/OT.     Continuous Infusions:   DOBUTamine 2.5 mcg/kg/min (02/20/20 1808)    furosemide (LASIX) 2 mg/mL continuous infusion (non-titrating) 20 mg/hr (02/21/20 0219)     Scheduled Meds:   amLODIPine  10 mg Oral Daily    aspirin  325 mg Per NG tube Daily    chlorothiazide (DIURIL) IVPB  500 mg Intravenous Q12H    docusate sodium  100 mg Oral BID    ferric gluconate (FERRLECIT) IVPB  250 mg Intravenous BID    hydrALAZINE  50 mg Oral Q8H    magnesium oxide  800 mg Oral TID    pantoprazole  40 mg Oral Daily    polyethylene glycol  17 g Oral Daily    potassium chloride  40 mEq Oral BID    sodium chloride 0.9%  10 mL Intravenous Q6H    spironolactone  25 mg Oral Daily    warfarin  7.5 mg Oral Daily     PRN Meds:sodium chloride, acetaminophen, albuterol sulfate, benzonatate, bisacodyL, Dextrose 10% Bolus, Dextrose 10% Bolus, Dextrose 10% Bolus, Dextrose 10% Bolus, Dextrose 10% Bolus, Dextrose 10% Bolus, diphenhydrAMINE, magnesium hydroxide 400 mg/5 ml, oxyCODONE, oxyCODONE, Flushing PICC Protocol **AND** sodium chloride 0.9% **AND** sodium chloride 0.9%    Review of patient's allergies indicates:   Allergen Reactions    Iodine and iodide containing products      Objective:     Vital Signs (Most Recent):  Temp: 98.3 °F (36.8 °C) (02/21/20 0720)  Pulse: 110 (02/21/20 0840)  Resp: 16 (02/21/20 0720)  BP: (!) 78/0 (02/21/20 0720)  SpO2: 98 % (02/21/20 0720) Vital Signs (24h Range):  Temp:  [98.1 °F (36.7 °C)-98.3 °F (36.8 °C)] 98.3 °F (36.8 °C)  Pulse:  [] 110  Resp:  [16-18] 16  SpO2:  [93 %-98 %] 98 %  BP: (78-90)/(0) 78/0     Patient Vitals for the past 72 hrs (Last 3 readings):   Weight   02/21/20 0619 77.7 kg (171 lb 4.8 oz)   02/20/20 0600 79.5 kg (175 lb 2.5 oz)   02/19/20 1521 80.3 kg (177 lb)     Body mass index is 26.83 kg/m².      Intake/Output Summary (Last 24 hours) at 2/21/2020 1019  Last data filed at 2/21/2020  0900  Gross per 24 hour   Intake 2665.98 ml   Output 3480 ml   Net -814.02 ml       Hemodynamic Parameters:  CVP:  [12 mmHg] 12 mmHg    Telemetry: ST    Physical Exam   Constitutional: He is oriented to person, place, and time. He appears well-developed and well-nourished.   HENT:   Head: Normocephalic and atraumatic.   Eyes: Pupils are equal, round, and reactive to light. Conjunctivae and EOM are normal.   Neck: Normal range of motion. Neck supple. No JVD present. No thyromegaly present.   Cardiovascular: Regular rhythm.   Tachycardic. Smooth VAD hum   Pulmonary/Chest: Effort normal.   Breath sounds decreased left base with few crackles left base   Abdominal: Soft. Bowel sounds are normal.   Musculoskeletal: Normal range of motion. He exhibits no edema.   Neurological: He is alert and oriented to person, place, and time.   Skin: Skin is warm and dry. Capillary refill takes 2 to 3 seconds.   Psychiatric: He has a normal mood and affect. His behavior is normal. Judgment and thought content normal.       Significant Labs:  CBC:  Recent Labs   Lab 02/19/20 0411 02/20/20 0459 02/21/20  0515   WBC 19.10*  19.10*  19.10* 17.78* 17.83*   RBC 2.54*  2.54*  2.54* 2.83* 3.06*   HGB 6.5*  6.5*  6.5* 7.1* 7.9*   HCT 20.7*  20.7*  20.7* 23.7* 25.6*   *  466*  466* 539* 714*   MCV 82  82  82 84 84   MCH 25.6*  25.6*  25.6* 25.1* 25.8*   MCHC 31.4*  31.4*  31.4* 30.0* 30.9*     BNP:  Recent Labs   Lab 02/17/20 0347 02/19/20 0411 02/21/20  0515   * 755* 825*     CMP:  Recent Labs   Lab 02/17/20 0347 02/19/20 0411 02/20/20 0459 02/21/20  0515   *  166*   < > 109 100 132*   CALCIUM 9.0  9.0   < > 8.5* 9.0 9.3   ALBUMIN 2.4*  2.4*  --  2.4*  --  2.6*   PROT 7.9  7.9  --  7.4  --  8.2   *  135*   < > 133* 133* 134*   K 4.7  4.7   < > 4.2 3.8 3.6   CO2 33*  33*   < > 30* 31* 34*   CL 93*  93*   < > 92* 92* 90*   BUN 30*  30*   < > 5* 22* 23*   CREATININE 1.1  1.1   < > 1.1 0.9  1.0   ALKPHOS 118  118  --  121  --  142*   ALT 26  26  --  23  --  23   AST 30  30  --  26  --  28   BILITOT 0.6  0.6  --  0.5  --  0.6    < > = values in this interval not displayed.      Coagulation:   Recent Labs   Lab 02/19/20 0411 02/20/20 0459 02/21/20 0515   INR 1.8*  1.8* 2.0* 2.1*   APTT 39.1*  39.1*  39.1* 43.0*  43.0* 40.4*     LDH:  Recent Labs   Lab 02/19/20 0411 02/20/20 0459 02/21/20  0515   * 382* 393*     Microbiology:  Microbiology Results (last 7 days)     ** No results found for the last 168 hours. **          I have reviewed all pertinent labs within the past 24 hours.    Estimated Creatinine Clearance: 78 mL/min (based on SCr of 1 mg/dL).    Diagnostic Results:  I have reviewed all pertinent imaging results/findings within the past 24 hours.

## 2020-02-21 NOTE — PROGRESS NOTES
[facility]  Heart Transplant  Progress Note    Patient Name: Saba Lott  MRN: [id number]  Admission Date: 1/15/2020  Hospital Length of Stay: 37 days  Attending Physician: Fernandez Gr Jr.,*  Primary Care Provider: Primary Doctor No  Principal Problem:LVAD (left ventricular assist device) present    Subjective:     **Interval History: Sitting up in chair. No complaints. Progressing with PT/OT.     Continuous Infusions:   DOBUTamine 2.5 mcg/kg/min (02/20/20 1808)    furosemide (LASIX) 2 mg/mL continuous infusion (non-titrating) 20 mg/hr (02/21/20 0219)     Scheduled Meds:   amLODIPine  10 mg Oral Daily    aspirin  325 mg Per NG tube Daily    chlorothiazide (DIURIL) IVPB  500 mg Intravenous Q12H    docusate sodium  100 mg Oral BID    ferric gluconate (FERRLECIT) IVPB  250 mg Intravenous BID    hydrALAZINE  50 mg Oral Q8H    magnesium oxide  800 mg Oral TID    pantoprazole  40 mg Oral Daily    polyethylene glycol  17 g Oral Daily    potassium chloride  40 mEq Oral BID    sodium chloride 0.9%  10 mL Intravenous Q6H    spironolactone  25 mg Oral Daily    warfarin  7.5 mg Oral Daily     PRN Meds:sodium chloride, acetaminophen, albuterol sulfate, benzonatate, bisacodyL, Dextrose 10% Bolus, Dextrose 10% Bolus, Dextrose 10% Bolus, Dextrose 10% Bolus, Dextrose 10% Bolus, Dextrose 10% Bolus, diphenhydrAMINE, magnesium hydroxide 400 mg/5 ml, oxyCODONE, oxyCODONE, Flushing PICC Protocol **AND** sodium chloride 0.9% **AND** sodium chloride 0.9%    Review of patient's allergies indicates:   Allergen Reactions    Iodine and iodide containing products      Objective:     Vital Signs (Most Recent):  Temp: 98.3 °F (36.8 °C) (02/21/20 0720)  Pulse: 110 (02/21/20 0840)  Resp: 16 (02/21/20 0720)  BP: (!) 78/0 (02/21/20 0720)  SpO2: 98 % (02/21/20 0720) Vital Signs (24h Range):  Temp:  [98.1 °F (36.7 °C)-98.3 °F (36.8 °C)] 98.3 °F (36.8 °C)  Pulse:  [] 110  Resp:  [16-18] 16  SpO2:  [93 %-98  %] 98 %  BP: (78-90)/(0) 78/0     Patient Vitals for the past 72 hrs (Last 3 readings):   Weight   02/21/20 0619 77.7 kg (171 lb 4.8 oz)   02/20/20 0600 79.5 kg (175 lb 2.5 oz)   02/19/20 1521 80.3 kg (177 lb)     Body mass index is 26.83 kg/m².      Intake/Output Summary (Last 24 hours) at 2/21/2020 1019  Last data filed at 2/21/2020 0900  Gross per 24 hour   Intake 2665.98 ml   Output 3480 ml   Net -814.02 ml       Hemodynamic Parameters:  CVP:  [12 mmHg] 12 mmHg    Telemetry: ST    Physical Exam   Constitutional: He is oriented to person, place, and time. He appears well-developed and well-nourished.   HENT:   Head: Normocephalic and atraumatic.   Eyes: Pupils are equal, round, and reactive to light. Conjunctivae and EOM are normal.   Neck: Normal range of motion. Neck supple. No JVD present. No thyromegaly present.   Cardiovascular: Regular rhythm.   Tachycardic. Smooth VAD hum   Pulmonary/Chest: Effort normal.   Breath sounds decreased left base with few crackles left base   Abdominal: Soft. Bowel sounds are normal.   Musculoskeletal: Normal range of motion. He exhibits no edema.   Neurological: He is alert and oriented to person, place, and time.   Skin: Skin is warm and dry. Capillary refill takes 2 to 3 seconds.   Psychiatric: He has a normal mood and affect. His behavior is normal. Judgment and thought content normal.       Significant Labs:  CBC:  Recent Labs   Lab 02/19/20  0411 02/20/20  0459 02/21/20  0515   WBC 19.10*  19.10*  19.10* 17.78* 17.83*   RBC 2.54*  2.54*  2.54* 2.83* 3.06*   HGB 6.5*  6.5*  6.5* 7.1* 7.9*   HCT 20.7*  20.7*  20.7* 23.7* 25.6*   *  466*  466* 539* 714*   MCV 82  82  82 84 84   MCH 25.6*  25.6*  25.6* 25.1* 25.8*   MCHC 31.4*  31.4*  31.4* 30.0* 30.9*     BNP:  Recent Labs   Lab 02/17/20  0347 02/19/20  0411 02/21/20  0515   * 755* 825*     CMP:  Recent Labs   Lab 02/17/20 0347 02/19/20  0411 02/20/20  0459 02/21/20  0515   *  166*   <  > 109 100 132*   CALCIUM 9.0  9.0   < > 8.5* 9.0 9.3   ALBUMIN 2.4*  2.4*  --  2.4*  --  2.6*   PROT 7.9  7.9  --  7.4  --  8.2   *  135*   < > 133* 133* 134*   K 4.7  4.7   < > 4.2 3.8 3.6   CO2 33*  33*   < > 30* 31* 34*   CL 93*  93*   < > 92* 92* 90*   BUN 30*  30*   < > 5* 22* 23*   CREATININE 1.1  1.1   < > 1.1 0.9 1.0   ALKPHOS 118  118  --  121  --  142*   ALT 26  26  --  23  --  23   AST 30  30  --  26  --  28   BILITOT 0.6  0.6  --  0.5  --  0.6    < > = values in this interval not displayed.      Coagulation:   Recent Labs   Lab 02/19/20 0411 02/20/20 0459 02/21/20  0515   INR 1.8*  1.8* 2.0* 2.1*   APTT 39.1*  39.1*  39.1* 43.0*  43.0* 40.4*     LDH:  Recent Labs   Lab 02/19/20 0411 02/20/20 0459 02/21/20  0515   * 382* 393*     Microbiology:  Microbiology Results (last 7 days)     ** No results found for the last 168 hours. **          I have reviewed all pertinent labs within the past 24 hours.    Estimated Creatinine Clearance: 78 mL/min (based on SCr of 1 mg/dL).    Diagnostic Results:  I have reviewed all pertinent imaging results/findings within the past 24 hours.    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 Chronic MARC - Class II-III and mild LE edema.      Hospital Course (synopsis of major diagnoses, care, treatment, and services provided during the course of the hospital stay):  -2/12 admit to CDU for diuresis with IV lasix  -IV abx   -CXR with suspected small left pleural effusion with associated left basilar atelectasis versus evolving airspace disease  -2/13 single chamber ICD implant; IV lasix decreased to BID  -2/16 add dobutamine, hold BB due to hypotension, no ACE-I or ARB due to recent HPI hypotension  -2/17 Lasix changed to PO  -2/18 dobutamine  discontinued    [place] Thursday due to severe SOB for a week with associated orthopnea, MARC, and PND unable to lie flat and found to be in acute diastolic heart failure. States he normally weighs around 219 but up to 254lb on admission tonight. Says he hasn't been the most compliant in terms of fluid restriction and daily weights but has avoided salt. BNP on admission was 2375. BUN/CRT 32/1.4 He was started on IV diuretics but continued to deteriorate. Creatinine continued to increase and reached 4.3 with oliguria. He was started on CRRT for a few days and tolerated well. Renal u/s was negative for obstruction and liver u/s without findings of cirrhosis. All of this was progression of cardiorenal syndrome with liver congestion from severe volume overload. On arrival vitals stable and in no acute distress. He has obvious anasarca up to the chest. He did have uop of 500 at time of arrival also.    TTE 5/28/19  · Moderate left ventricular enlargement.  · Severely decreased left ventricular systolic function. The estimated ejection fraction is 20%  · Global hypokinetic wall motion.  · Grade III (severe) left ventricular diastolic dysfunction consistent with restrictive physiology.  · Severe left atrial enlargement.  · Moderate right ventricular enlargement.  · Low normal right ventricular systolic function.  · Mild right atrial enlargement.  · Moderate mitral regurgitation.  Mild to moderate tricuspid regurgitation    * LVAD (left ventricular assist device) present  -HeartMate 3 Implanted 02/06/2020 as DT. Chest closed 2/7.   -HTS Primary.  -Implanted by Dr. Joshi.  -Continue Coumadin, Goal INR 2.0-3.0.  -Antiplatelets  mg.  -LDH is stable overall today. Will continue to monitor daily.  -CVP 13. Continues on  2.5mcg/kg/mn and lasix 20mg/hr. Also getting IV diuril 500 mg bid.   -BP: Doppler/MAP goal 60-90. Continue Norvasc 10 mg qd. Increase Hydralazine to 50 mg every 8 hours and add  Aldactone 25 mg qd  -Speed set at 5300.  -Echo 2/19 at 5300 rpm: LVIDD 6.2, TAPSE 1.26, Mild MR, AV opens intermittently. The ventricular septum is at midline. Moderate anterior pericardial effusion reported. This echo was reviewed by Dr. Gr  -Not listed for OHTx.  -PT/OT/VAD education    Procedure: Device Interrogation Including analysis of device parameters.  Current Settings: Ventricular Assist Device.  Review of device function is stable/unstable stable.    TXP LVAD INTERROGATIONS 2/20/2020 2/20/2020 2/20/2020 2/20/2020 2/19/2020 2/19/2020 2/19/2020   Type HeartMate3 HeartMate3 HeartMate3 HeartMate3 HeartMate3 HeartMate3 HeartMate3   Flow 4.7 4.8 4.8 4.7 4.7 4.8 4.8   Speed 5300 5300 5300 5300 5300 5300 5300   PI 3.3 3.3 3.3 3.3 3.4 3.3 3.4   Power (Centeno) 3.9 3.9 3.9 3.9 3.9 3.9 3.9   LSL 4900 4900 4900 4900 4900 4900 4900   Pulsatility Intermittent pulse Intermittent pulse Intermittent pulse Pulse - - -       Anemia due to acute blood loss  - low iron stores, started IV iron 2/19  -S/P 1u prbc 2/19.     Leukocytosis  - Afebrile.   -Trending down slowly.     Acute hyperglycemia  -HgA1C 6.6  -Endocrine following    Moderate malnutrition  - Boost added 1/27. Dr Joshi advised pt to not consume.   - Last Prealbumin 16.    Acute on chronic combined systolic and diastolic heart failure  - See LVAD    Morbid obesity  -Weight down considerably since admit with diuresis.  - Body mass index is 26.83 kg/m².      ANJELICA (acute kidney injury)  -Creatinine was 3.0 on admit and got as high as 4.3 at OSH prior to transfer. Renal function was normal 8 months ago.  -Cr has since normalized.     Cardiogenic shock  -NICM; Likely Etoh induced  -Transferred from [facility] with IABP at 1:1 for further level of care. IABP removed 1/25 and replaced 2/3.   -S/P HMIII on 2/3.     Deep vein thrombosis (DVT) of right lower extremity  -Unsure of timing but was on eliquis PTA.   -now on Coumadin post LVAD    AICD (automatic  cardioverter/defibrillator) present  -Medtronic single chamber ICD placed 2019 for primary [place]  [id number]  Heart Transplant  [facility]

## 2020-02-21 NOTE — ASSESSMENT & PLAN NOTE
-NICM; Likely Etoh induced  -Transferred from [facility] with IABP at 1:1 for further level of care. IABP removed 1/25 and replaced 2/3.   -S/P HMIII on 2/3.

## 2020-02-22 PROBLEM — D75.839 THROMBOCYTOSIS: Status: ACTIVE | Noted: 2020-02-22

## 2020-02-22 LAB
ALLENS TEST: ABNORMAL
ANION GAP SERPL CALC-SCNC: 8 MMOL/L (ref 8–16)
APTT BLDCRRT: 38.6 SEC (ref 21–32)
BASOPHILS # BLD AUTO: 0.04 K/UL (ref 0–0.2)
BASOPHILS NFR BLD: 0.2 % (ref 0–1.9)
BUN SERPL-MCNC: 31 MG/DL (ref 6–20)
CALCIUM SERPL-MCNC: 9.8 MG/DL (ref 8.7–10.5)
CHLORIDE SERPL-SCNC: 90 MMOL/L (ref 95–110)
CO2 SERPL-SCNC: 36 MMOL/L (ref 23–29)
CREAT SERPL-MCNC: 1.1 MG/DL (ref 0.5–1.4)
DELSYS: ABNORMAL
DIFFERENTIAL METHOD: ABNORMAL
EOSINOPHIL # BLD AUTO: 0.3 K/UL (ref 0–0.5)
EOSINOPHIL NFR BLD: 1.8 % (ref 0–8)
ERYTHROCYTE [DISTWIDTH] IN BLOOD BY AUTOMATED COUNT: 21 % (ref 11.5–14.5)
EST. GFR  (AFRICAN AMERICAN): >60 ML/MIN/1.73 M^2
EST. GFR  (NON AFRICAN AMERICAN): >60 ML/MIN/1.73 M^2
GLUCOSE SERPL-MCNC: 118 MG/DL (ref 70–110)
HCO3 UR-SCNC: 35.8 MMOL/L (ref 24–28)
HCT VFR BLD AUTO: 27.1 % (ref 40–54)
HGB BLD-MCNC: 8.1 G/DL (ref 14–18)
IMM GRANULOCYTES # BLD AUTO: 0.49 K/UL (ref 0–0.04)
IMM GRANULOCYTES NFR BLD AUTO: 2.6 % (ref 0–0.5)
INR PPP: 2 (ref 0.8–1.2)
LDH SERPL L TO P-CCNC: 386 U/L (ref 110–260)
LYMPHOCYTES # BLD AUTO: 2.1 K/UL (ref 1–4.8)
LYMPHOCYTES NFR BLD: 11.5 % (ref 18–48)
MAGNESIUM SERPL-MCNC: 2.2 MG/DL (ref 1.6–2.6)
MCH RBC QN AUTO: 25.4 PG (ref 27–31)
MCHC RBC AUTO-ENTMCNC: 29.9 G/DL (ref 32–36)
MCV RBC AUTO: 85 FL (ref 82–98)
MONOCYTES # BLD AUTO: 1.2 K/UL (ref 0.3–1)
MONOCYTES NFR BLD: 6.5 % (ref 4–15)
NEUTROPHILS # BLD AUTO: 14.4 K/UL (ref 1.8–7.7)
NEUTROPHILS NFR BLD: 77.4 % (ref 38–73)
NRBC BLD-RTO: 3 /100 WBC
PCO2 BLDA: 46.4 MMHG (ref 35–45)
PH SMN: 7.5 [PH] (ref 7.35–7.45)
PHOSPHATE SERPL-MCNC: 4.2 MG/DL (ref 2.7–4.5)
PLATELET # BLD AUTO: 832 K/UL (ref 150–350)
PMV BLD AUTO: 8.9 FL (ref 9.2–12.9)
PO2 BLDA: 33 MMHG (ref 40–60)
POC BE: 13 MMOL/L
POC SATURATED O2: 68 % (ref 95–100)
POC TCO2: 37 MMOL/L (ref 24–29)
POTASSIUM SERPL-SCNC: 4 MMOL/L (ref 3.5–5.1)
PROTHROMBIN TIME: 19.4 SEC (ref 9–12.5)
RBC # BLD AUTO: 3.19 M/UL (ref 4.6–6.2)
SAMPLE: ABNORMAL
SITE: ABNORMAL
SODIUM SERPL-SCNC: 134 MMOL/L (ref 136–145)
WBC # BLD AUTO: 18.57 K/UL (ref 3.9–12.7)

## 2020-02-22 PROCEDURE — 63600175 PHARM REV CODE 636 W HCPCS: Performed by: PHYSICIAN ASSISTANT

## 2020-02-22 PROCEDURE — 94761 N-INVAS EAR/PLS OXIMETRY MLT: CPT

## 2020-02-22 PROCEDURE — 93750 INTERROGATION VAD IN PERSON: CPT | Performed by: INTERNAL MEDICINE

## 2020-02-22 PROCEDURE — 94799 UNLISTED PULMONARY SVC/PX: CPT

## 2020-02-22 PROCEDURE — 25000003 PHARM REV CODE 250: Performed by: NURSE PRACTITIONER

## 2020-02-22 PROCEDURE — 85025 COMPLETE CBC W/AUTO DIFF WBC: CPT

## 2020-02-22 PROCEDURE — 93750 PR INTERROGATE VENT ASSIST DEV, IN PERSON, W PHYSICIAN ANALYSIS: ICD-10-PCS | Mod: ,,, | Performed by: INTERNAL MEDICINE

## 2020-02-22 PROCEDURE — 63600175 PHARM REV CODE 636 W HCPCS: Performed by: NURSE PRACTITIONER

## 2020-02-22 PROCEDURE — 80048 BASIC METABOLIC PNL TOTAL CA: CPT

## 2020-02-22 PROCEDURE — 20600001 HC STEP DOWN PRIVATE ROOM

## 2020-02-22 PROCEDURE — 94664 DEMO&/EVAL PT USE INHALER: CPT

## 2020-02-22 PROCEDURE — 83615 LACTATE (LD) (LDH) ENZYME: CPT

## 2020-02-22 PROCEDURE — 25000003 PHARM REV CODE 250: Performed by: STUDENT IN AN ORGANIZED HEALTH CARE EDUCATION/TRAINING PROGRAM

## 2020-02-22 PROCEDURE — 99233 SBSQ HOSP IP/OBS HIGH 50: CPT | Mod: ,,, | Performed by: INTERNAL MEDICINE

## 2020-02-22 PROCEDURE — 85610 PROTHROMBIN TIME: CPT

## 2020-02-22 PROCEDURE — 99233 PR SUBSEQUENT HOSPITAL CARE,LEVL III: ICD-10-PCS | Mod: GC,,, | Performed by: INTERNAL MEDICINE

## 2020-02-22 PROCEDURE — 27000248 HC VAD-ADDITIONAL DAY

## 2020-02-22 PROCEDURE — 99233 PR SUBSEQUENT HOSPITAL CARE,LEVL III: ICD-10-PCS | Mod: ,,, | Performed by: INTERNAL MEDICINE

## 2020-02-22 PROCEDURE — 99900035 HC TECH TIME PER 15 MIN (STAT)

## 2020-02-22 PROCEDURE — 82803 BLOOD GASES ANY COMBINATION: CPT

## 2020-02-22 PROCEDURE — 25000003 PHARM REV CODE 250: Performed by: INTERNAL MEDICINE

## 2020-02-22 PROCEDURE — A4216 STERILE WATER/SALINE, 10 ML: HCPCS | Performed by: STUDENT IN AN ORGANIZED HEALTH CARE EDUCATION/TRAINING PROGRAM

## 2020-02-22 PROCEDURE — 84100 ASSAY OF PHOSPHORUS: CPT

## 2020-02-22 PROCEDURE — 63600175 PHARM REV CODE 636 W HCPCS: Performed by: INTERNAL MEDICINE

## 2020-02-22 PROCEDURE — 83735 ASSAY OF MAGNESIUM: CPT

## 2020-02-22 PROCEDURE — 85730 THROMBOPLASTIN TIME PARTIAL: CPT

## 2020-02-22 PROCEDURE — 99233 SBSQ HOSP IP/OBS HIGH 50: CPT | Mod: GC,,, | Performed by: INTERNAL MEDICINE

## 2020-02-22 PROCEDURE — 93750 INTERROGATION VAD IN PERSON: CPT | Mod: ,,, | Performed by: INTERNAL MEDICINE

## 2020-02-22 RX ADMIN — FUROSEMIDE 20 MG/HR: 10 INJECTION, SOLUTION INTRAMUSCULAR; INTRAVENOUS at 12:02

## 2020-02-22 RX ADMIN — SODIUM CHLORIDE 250 MG: 9 INJECTION, SOLUTION INTRAVENOUS at 08:02

## 2020-02-22 RX ADMIN — DOBUTAMINE IN DEXTROSE 2.5 MCG/KG/MIN: 200 INJECTION, SOLUTION INTRAVENOUS at 02:02

## 2020-02-22 RX ADMIN — HYDRALAZINE HYDROCHLORIDE 50 MG: 25 TABLET, FILM COATED ORAL at 01:02

## 2020-02-22 RX ADMIN — SODIUM CHLORIDE 250 MG: 9 INJECTION, SOLUTION INTRAVENOUS at 09:02

## 2020-02-22 RX ADMIN — DOCUSATE SODIUM 100 MG: 100 CAPSULE, LIQUID FILLED ORAL at 09:02

## 2020-02-22 RX ADMIN — AMLODIPINE BESYLATE 10 MG: 10 TABLET ORAL at 09:02

## 2020-02-22 RX ADMIN — Medication 800 MG: at 02:02

## 2020-02-22 RX ADMIN — HYDRALAZINE HYDROCHLORIDE 50 MG: 25 TABLET, FILM COATED ORAL at 08:02

## 2020-02-22 RX ADMIN — DOCUSATE SODIUM 100 MG: 100 CAPSULE, LIQUID FILLED ORAL at 08:02

## 2020-02-22 RX ADMIN — HYDRALAZINE HYDROCHLORIDE 50 MG: 25 TABLET, FILM COATED ORAL at 05:02

## 2020-02-22 RX ADMIN — Medication 10 ML: at 05:02

## 2020-02-22 RX ADMIN — Medication 10 ML: at 11:02

## 2020-02-22 RX ADMIN — Medication 800 MG: at 09:02

## 2020-02-22 RX ADMIN — Medication 800 MG: at 08:02

## 2020-02-22 RX ADMIN — PANTOPRAZOLE SODIUM 40 MG: 40 TABLET, DELAYED RELEASE ORAL at 09:02

## 2020-02-22 RX ADMIN — Medication 10 ML: at 12:02

## 2020-02-22 RX ADMIN — POTASSIUM CHLORIDE 40 MEQ: 1500 TABLET, EXTENDED RELEASE ORAL at 09:02

## 2020-02-22 RX ADMIN — SPIRONOLACTONE 25 MG: 25 TABLET ORAL at 09:02

## 2020-02-22 RX ADMIN — POTASSIUM CHLORIDE 40 MEQ: 1500 TABLET, EXTENDED RELEASE ORAL at 08:02

## 2020-02-22 RX ADMIN — ASPIRIN 325 MG ORAL TABLET 325 MG: 325 PILL ORAL at 09:02

## 2020-02-22 RX ADMIN — FUROSEMIDE 20 MG/HR: 10 INJECTION, SOLUTION INTRAMUSCULAR; INTRAVENOUS at 11:02

## 2020-02-22 RX ADMIN — WARFARIN SODIUM 7.5 MG: 7.5 TABLET ORAL at 05:02

## 2020-02-22 RX ADMIN — FUROSEMIDE 20 MG/HR: 10 INJECTION, SOLUTION INTRAMUSCULAR; INTRAVENOUS at 03:02

## 2020-02-22 NOTE — PROCEDURES
"Saba Lott is a 54 y.o. male patient.    Temp: 98.2 °F (36.8 °C) (02/21/20 2000)  Pulse: (!) 113 (02/22/20 0103)  Resp: (!) 22 (02/22/20 0103)  BP: (!) 84/0 (02/21/20 2000)  SpO2: (!) 94 % (02/22/20 0103)  Weight: 77.7 kg (171 lb 4.8 oz) (02/21/20 0619)  Height: 5' 7" (170.2 cm) (02/19/20 1521)    LVAD interrogation  Date/Time: 2/22/2020 1:16 AM  Performed by: Fernandez Gr Jr., MD  Authorized by: Pat Venegas MD           TXP LVAD INTERROGATIONS 2/20/2020 2/20/2020 2/20/2020 2/20/2020   Type HeartMate3 HeartMate3 HeartMate3 HeartMate3   Flow 4.7 4.8 4.8 4.7   Speed 5300 5300 5300 5300   PI 3.3 3.3 3.3 3.3   Power (Centeno) 3.9 3.9 3.9 3.9   LSL 4900 4900 4900 4900   Pulsatility Intermittent pulse Intermittent pulse Intermittent pulse Pulse   Interrogation of Ventricular assist device was performed with physician analysis of device parameters and review of device function. I have personally reviewed the interrogation findings and agree with findings as stated.       Fernandez Gr Jr  2/22/2020  "

## 2020-02-22 NOTE — SUBJECTIVE & OBJECTIVE
Interval History: No complaints. Weight down 6# over past 3 days, and venous CO2 climbing. CVP 12  - IV Diuril D/C'd for now    Continuous Infusions:   DOBUTamine 2.5 mcg/kg/min (02/22/20 0251)    furosemide (LASIX) 2 mg/mL continuous infusion (non-titrating) 20 mg/hr (02/22/20 0331)     Scheduled Meds:   amLODIPine  10 mg Oral Daily    aspirin  325 mg Per NG tube Daily    docusate sodium  100 mg Oral BID    ferric gluconate (FERRLECIT) IVPB  250 mg Intravenous BID    hydrALAZINE  50 mg Oral Q8H    magnesium oxide  800 mg Oral TID    pantoprazole  40 mg Oral Daily    polyethylene glycol  17 g Oral Daily    potassium chloride  40 mEq Oral BID    sodium chloride 0.9%  10 mL Intravenous Q6H    spironolactone  25 mg Oral Daily    warfarin  7.5 mg Oral Daily     PRN Meds:sodium chloride, acetaminophen, albuterol sulfate, benzonatate, bisacodyL, Dextrose 10% Bolus, Dextrose 10% Bolus, Dextrose 10% Bolus, Dextrose 10% Bolus, Dextrose 10% Bolus, Dextrose 10% Bolus, diphenhydrAMINE, magnesium hydroxide 400 mg/5 ml, oxyCODONE, oxyCODONE, Flushing PICC Protocol **AND** sodium chloride 0.9% **AND** sodium chloride 0.9%    Review of patient's allergies indicates:   Allergen Reactions    Iodine and iodide containing products      Objective:     Vital Signs (Most Recent):  Temp: 98.5 °F (36.9 °C) (02/22/20 0500)  Pulse: 102 (02/22/20 0500)  Resp: 16 (02/22/20 0500)  BP: (!) 78/0 (02/22/20 0500)  SpO2: 97 % (02/22/20 0500) Vital Signs (24h Range):  Temp:  [98.2 °F (36.8 °C)-99.2 °F (37.3 °C)] 98.5 °F (36.9 °C)  Pulse:  [] 102  Resp:  [16-22] 16  SpO2:  [94 %-99 %] 97 %  BP: (76-92)/(0) 78/0     Patient Vitals for the past 72 hrs (Last 3 readings):   Weight   02/21/20 0619 77.7 kg (171 lb 4.8 oz)   02/20/20 0600 79.5 kg (175 lb 2.5 oz)   02/19/20 1521 80.3 kg (177 lb)     Body mass index is 26.83 kg/m².      Intake/Output Summary (Last 24 hours) at 2/22/2020 0731  Last data filed at 2/22/2020 0700  Gross per 24  hour   Intake 1936.4 ml   Output 3210 ml   Net -1273.6 ml       Hemodynamic Parameters:       Telemetry: ST    Physical Exam   Constitutional: He is oriented to person, place, and time. He appears well-developed and well-nourished.   HENT:   Head: Normocephalic and atraumatic.   Eyes: Pupils are equal, round, and reactive to light. Conjunctivae and EOM are normal.   Neck: Normal range of motion. Neck supple. No JVD present. No thyromegaly present.   Cardiovascular: Regular rhythm.   Tachycardic. Smooth VAD hum   Pulmonary/Chest: Effort normal.   Breath sounds decreased left base with few crackles left base   Abdominal: Soft. Bowel sounds are normal.   Musculoskeletal: Normal range of motion. He exhibits no edema.   Neurological: He is alert and oriented to person, place, and time.   Skin: Skin is warm and dry. Capillary refill takes 2 to 3 seconds.   Psychiatric: He has a normal mood and affect. His behavior is normal. Judgment and thought content normal.       Significant Labs:  CBC:  Recent Labs   Lab 02/20/20  0459 02/21/20  0515 02/22/20  0402   WBC 17.78* 17.83* 18.57*   RBC 2.83* 3.06* 3.19*   HGB 7.1* 7.9* 8.1*   HCT 23.7* 25.6* 27.1*   * 714* 832*   MCV 84 84 85   MCH 25.1* 25.8* 25.4*   MCHC 30.0* 30.9* 29.9*     BNP:  Recent Labs   Lab 02/17/20  0347 02/19/20  0411 02/21/20  0515   * 755* 825*     CMP:  Recent Labs   Lab 02/17/20  0347 02/19/20  0411 02/20/20  0459 02/21/20  0515 02/22/20  0402   *  166*   < > 109 100 132* 118*   CALCIUM 9.0  9.0   < > 8.5* 9.0 9.3 9.8   ALBUMIN 2.4*  2.4*  --  2.4*  --  2.6*  --    PROT 7.9  7.9  --  7.4  --  8.2  --    *  135*   < > 133* 133* 134* 134*   K 4.7  4.7   < > 4.2 3.8 3.6 4.0   CO2 33*  33*   < > 30* 31* 34* 36*   CL 93*  93*   < > 92* 92* 90* 90*   BUN 30*  30*   < > 5* 22* 23* 31*   CREATININE 1.1  1.1   < > 1.1 0.9 1.0 1.1   ALKPHOS 118  118  --  121  --  142*  --    ALT 26  26  --  23  --  23  --    AST 30  30  --   26  --  28  --    BILITOT 0.6  0.6  --  0.5  --  0.6  --     < > = values in this interval not displayed.      Coagulation:   Recent Labs   Lab 02/20/20 0459 02/21/20 0515 02/22/20 0402   INR 2.0* 2.1* 2.0*   APTT 43.0*  43.0* 40.4* 38.6*     LDH:  Recent Labs   Lab 02/20/20 0459 02/21/20 0515 02/22/20  0402   * 393* 386*     Microbiology:  Microbiology Results (last 7 days)     ** No results found for the last 168 hours. **          I have reviewed all pertinent labs within the past 24 hours.    Estimated Creatinine Clearance: 70.9 mL/min (based on SCr of 1.1 mg/dL).    Diagnostic Results:  I have reviewed all pertinent imaging results/findings within the past 24 hours.

## 2020-02-22 NOTE — CONSULTS
ESPERANZA consulted to to troubleshoot PICC line with a leaking gray port.  Purple cap replaced and flushed with 30 cc saline w/o issue.

## 2020-02-22 NOTE — PROCEDURES
"Saba Lott is a 54 y.o. male patient.    Temp: 98.2 °F (36.8 °C) (02/21/20 2000)  Pulse: (!) 113 (02/22/20 0103)  Resp: (!) 22 (02/22/20 0103)  BP: (!) 84/0 (02/21/20 2000)  SpO2: (!) 94 % (02/22/20 0103)  Weight: 77.7 kg (171 lb 4.8 oz) (02/21/20 0619)  Height: 5' 7" (170.2 cm) (02/19/20 1521)    LVAD Interrogations In Patient Only  Date/Time: 2/22/2020 1:19 AM  Performed by: Fernandez Gr Jr., MD  Authorized by: Pat Venegas MD           TXP LVAD INTERROGATIONS 2/21/2020 2/21/2020 2/21/2020 2/20/2020 2/20/2020 2/20/2020 2/20/2020   Type HeartMate3 HeartMate3 HeartMate3 HeartMate3 HeartMate3 HeartMate3 HeartMate3   Flow 4.6 4.7 4.7 4.7 4.8 4.8 4.7   Speed 5300 5300 5300 5300 5300 5300 5300   PI 3.3 3.4 3.3 3.3 3.3 3.3 3.3   Power (Centeno) 3.7 3.8 3.9 3.9 3.9 3.9 3.9   LSL 4900 4900 4900 4900 4900 4900 4900   Pulsatility Intermittent pulse Intermittent pulse Intermittent pulse Intermittent pulse Intermittent pulse Intermittent pulse Pulse   Interrogation of Ventricular assist device was performed with physician analysis of device parameters and review of device function. I have personally reviewed the interrogation findings and agree with findings as stated.     Fernandez Gr Jr  2/22/2020  "

## 2020-02-22 NOTE — PLAN OF CARE
Patient remains free from fall and injury. Does not complain of any pain at this time. Has Lasix and Dobutamine infusing. Patient is diuresing and adhering to fluid restrictions. LVAD DLES dressing change to be performed 02/22/2020. VS WNL. Patient is on continuous cardiac monitoring. Bed is in lowest locked position. Call light within reach. Will continue to monitor.
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
## 2020-02-22 NOTE — PLAN OF CARE
Plan of care reviewed with pt, verbalized understanding  Answered questions  Free from falls and injury  Afebrile  ST on tele, LVAD cond  DBT 2.5mcg/kg  LSX 20mg/hr  LVAD Hm3, hum noted      Wound dressing changed  PT OT seen  Will continue to monitor

## 2020-02-22 NOTE — ASSESSMENT & PLAN NOTE
-HeartMate 3 Implanted 02/06/2020 as DT. Chest closed 2/7.   -HTS Primary.  -Implanted by Dr. Joshi.  -Continue Coumadin, Goal INR 2.0-3.0.  -Antiplatelets  mg.  -LDH is stable overall today. Will continue to monitor daily.  -CVP 12 and sVO2 68. Continues on  2.5mcg/kg/mn. Given 6 # weight loss over 3 days and climbing venous CO2, Diuril D/C'd. Continue Lasix 20 mg/hr for now  -BP: Doppler/MAP goal 60-90. Continue Norvasc 10 mg qd, Hydralazine to 50 mg every 8 hours and Aldactone 25 mg qd  -Speed set at 5300.  -Echo 2/19 at 5300 rpm: LVIDD 6.2, TAPSE 1.26, Mild MR, AV opens intermittently. The ventricular septum is at midline. Moderate anterior pericardial effusion reported. This echo was reviewed by Dr. Gr  -Not listed for OHTx.  -PT/OT/VAD education    Procedure: Device Interrogation Including analysis of device parameters.  Current Settings: Ventricular Assist Device.  Review of device function is stable/unstable stable.    TXP LVAD INTERROGATIONS 2/21/2020 2/21/2020 2/21/2020 2/20/2020 2/20/2020 2/20/2020 2/20/2020   Type HeartMate3 HeartMate3 HeartMate3 HeartMate3 HeartMate3 HeartMate3 HeartMate3   Flow 4.6 4.7 4.7 4.7 4.8 4.8 4.7   Speed 5300 5300 5300 5300 5300 5300 5300   PI 3.3 3.4 3.3 3.3 3.3 3.3 3.3   Power (Centeno) 3.7 3.8 3.9 3.9 3.9 3.9 3.9   LSL 4900 4900 4900 4900 4900 4900 4900   Pulsatility Intermittent pulse Intermittent pulse Intermittent pulse Intermittent pulse Intermittent pulse Intermittent pulse Pulse

## 2020-02-22 NOTE — PROGRESS NOTES
[facility]  Heart Transplant  Progress Note    Patient Name: Saba Lott  MRN: [id number]  Admission Date: 1/15/2020  Hospital Length of Stay: 38 days  Attending Physician: Fernandez Gr Jr.,*  Primary Care Provider: Primary Doctor No  Principal Problem:LVAD (left ventricular assist device) present    Subjective:     Interval History: No complaints. Weight down 6# over past 3 days, and venous CO2 climbing. CVP 12  - IV Diuril D/C'd for now    Continuous Infusions:   DOBUTamine 2.5 mcg/kg/min (02/22/20 0251)    furosemide (LASIX) 2 mg/mL continuous infusion (non-titrating) 20 mg/hr (02/22/20 0331)     Scheduled Meds:   amLODIPine  10 mg Oral Daily    aspirin  325 mg Per NG tube Daily    docusate sodium  100 mg Oral BID    ferric gluconate (FERRLECIT) IVPB  250 mg Intravenous BID    hydrALAZINE  50 mg Oral Q8H    magnesium oxide  800 mg Oral TID    pantoprazole  40 mg Oral Daily    polyethylene glycol  17 g Oral Daily    potassium chloride  40 mEq Oral BID    sodium chloride 0.9%  10 mL Intravenous Q6H    spironolactone  25 mg Oral Daily    warfarin  7.5 mg Oral Daily     PRN Meds:sodium chloride, acetaminophen, albuterol sulfate, benzonatate, bisacodyL, Dextrose 10% Bolus, Dextrose 10% Bolus, Dextrose 10% Bolus, Dextrose 10% Bolus, Dextrose 10% Bolus, Dextrose 10% Bolus, diphenhydrAMINE, magnesium hydroxide 400 mg/5 ml, oxyCODONE, oxyCODONE, Flushing PICC Protocol **AND** sodium chloride 0.9% **AND** sodium chloride 0.9%    Review of patient's allergies indicates:   Allergen Reactions    Iodine and iodide containing products      Objective:     Vital Signs (Most Recent):  Temp: 98.5 °F (36.9 °C) (02/22/20 0500)  Pulse: 102 (02/22/20 0500)  Resp: 16 (02/22/20 0500)  BP: (!) 78/0 (02/22/20 0500)  SpO2: 97 % (02/22/20 0500) Vital Signs (24h Range):  Temp:  [98.2 °F (36.8 °C)-99.2 °F (37.3 °C)] 98.5 °F (36.9 °C)  Pulse:  [] 102  Resp:  [16-22] 16  SpO2:  [94 %-99 %] 97 %  BP:  (76-92)/(0) 78/0     Patient Vitals for the past 72 hrs (Last 3 readings):   Weight   02/21/20 0619 77.7 kg (171 lb 4.8 oz)   02/20/20 0600 79.5 kg (175 lb 2.5 oz)   02/19/20 1521 80.3 kg (177 lb)     Body mass index is 26.83 kg/m².      Intake/Output Summary (Last 24 hours) at 2/22/2020 0731  Last data filed at 2/22/2020 0700  Gross per 24 hour   Intake 1936.4 ml   Output 3210 ml   Net -1273.6 ml       Hemodynamic Parameters:       Telemetry: ST    Physical Exam   Constitutional: He is oriented to person, place, and time. He appears well-developed and well-nourished.   HENT:   Head: Normocephalic and atraumatic.   Eyes: Pupils are equal, round, and reactive to light. Conjunctivae and EOM are normal.   Neck: Normal range of motion. Neck supple. No JVD present. No thyromegaly present.   Cardiovascular: Regular rhythm.   Tachycardic. Smooth VAD hum   Pulmonary/Chest: Effort normal.   Breath sounds decreased left base with few crackles left base   Abdominal: Soft. Bowel sounds are normal.   Musculoskeletal: Normal range of motion. He exhibits no edema.   Neurological: He is alert and oriented to person, place, and time.   Skin: Skin is warm and dry. Capillary refill takes 2 to 3 seconds.   Psychiatric: He has a normal mood and affect. His behavior is normal. Judgment and thought content normal.       Significant Labs:  CBC:  Recent Labs   Lab 02/20/20  0459 02/21/20  0515 02/22/20  0402   WBC 17.78* 17.83* 18.57*   RBC 2.83* 3.06* 3.19*   HGB 7.1* 7.9* 8.1*   HCT 23.7* 25.6* 27.1*   * 714* 832*   MCV 84 84 85   MCH 25.1* 25.8* 25.4*   MCHC 30.0* 30.9* 29.9*     BNP:  Recent Labs   Lab 02/17/20  0347 02/19/20  0411 02/21/20  0515   * 755* 825*     CMP:  Recent Labs   Lab 02/17/20  0347  02/19/20  0411 02/20/20  0459 02/21/20  0515 02/22/20  0402   *  166*   < > 109 100 132* 118*   CALCIUM 9.0  9.0   < > 8.5* 9.0 9.3 9.8   ALBUMIN 2.4*  2.4*  --  2.4*  --  2.6*  --    PROT 7.9  7.9  --  7.4  --   8.2  --    *  135*   < > 133* 133* 134* 134*   K 4.7  4.7   < > 4.2 3.8 3.6 4.0   CO2 33*  33*   < > 30* 31* 34* 36*   CL 93*  93*   < > 92* 92* 90* 90*   BUN 30*  30*   < > 5* 22* 23* 31*   CREATININE 1.1  1.1   < > 1.1 0.9 1.0 1.1   ALKPHOS 118  118  --  121  --  142*  --    ALT 26  26  --  23  --  23  --    AST 30  30  --  26  --  28  --    BILITOT 0.6  0.6  --  0.5  --  0.6  --     < > = values in this interval not displayed.      Coagulation:   Recent Labs   Lab 02/20/20 0459 02/21/20  0515 02/22/20  0402   INR 2.0* 2.1* 2.0*   APTT 43.0*  43.0* 40.4* 38.6*     LDH:  Recent Labs   Lab 02/20/20 0459 02/21/20  0515 02/22/20  0402   * 393* 386*     Microbiology:  Microbiology Results (last 7 days)     ** No results found for the last 168 hours. **          I have reviewed all pertinent labs within the past 24 hours.    Estimated Creatinine Clearance: 70.9 mL/min (based on SCr of 1.1 mg/dL).    Diagnostic Results:  I have reviewed all pertinent imaging results/findings within the past 24 hours.    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 Chronic MARC - Class II-III and mild LE edema.      Hospital Course (synopsis of major diagnoses, care, treatment, and services provided during the course of the hospital stay):  -2/12 admit to CDU for diuresis with IV lasix  -IV abx   -CXR with suspected small left pleural effusion with associated left basilar atelectasis versus evolving airspace disease  -2/13 single chamber ICD implant; IV lasix decreased to BID  -2/16 add dobutamine, hold BB due to hypotension, no ACE-I or ARB due to recent HPI hypotension  -2/17 Lasix changed to PO  -2/18 dobutamine discontinued    Admitted to [facility] on Thursday due to severe SOB for a week with associated  orthopnea, MARC, and PND unable to lie flat and found to be in acute diastolic heart failure. States he normally weighs around 219 but up to 254lb on admission tonight. Says he hasn't been the most compliant in terms of fluid restriction and daily weights but has avoided salt. BNP on admission was 2375. BUN/CRT 32/1.4 He was started on IV diuretics but continued to deteriorate. Creatinine continued to increase and reached 4.3 with oliguria. He was started on CRRT for a few days and tolerated well. Renal u/s was negative for obstruction and liver u/s without findings of cirrhosis. All of this was progression of cardiorenal syndrome with liver congestion from severe volume overload. On arrival vitals stable and in no acute distress. He has obvious anasarca up to the chest. He did have uop of 500 at time of arrival also.    TTE 5/28/19  · Moderate left ventricular enlargement.  · Severely decreased left ventricular systolic function. The estimated ejection fraction is 20%  · Global hypokinetic wall motion.  · Grade III (severe) left ventricular diastolic dysfunction consistent with restrictive physiology.  · Severe left atrial enlargement.  · Moderate right ventricular enlargement.  · Low normal right ventricular systolic function.  · Mild right atrial enlargement.  · Moderate mitral regurgitation.  Mild to moderate tricuspid regurgitation    * LVAD (left ventricular assist device) present  -HeartMate 3 Implanted 02/06/2020 as DT. Chest closed 2/7.   -HTS Primary.  -Implanted by Dr. Joshi.  -Continue Coumadin, Goal INR 2.0-3.0.  -Antiplatelets  mg.  -LDH is stable overall today. Will continue to monitor daily.  -CVP 12 and sVO2 68. Continues on  2.5mcg/kg/mn. Given 6 # weight loss over 3 days and climbing venous CO2, Diuril D/C'd. Continue Lasix 20 mg/hr for now  -BP: Doppler/MAP goal 60-90. Continue Norvasc 10 mg qd, Hydralazine to 50 mg every 8 hours and Aldactone 25 mg qd  -Speed set at 5300.  -Echo 2/19  at 5300 rpm: LVIDD 6.2, TAPSE 1.26, Mild MR, AV opens intermittently. The ventricular septum is at midline. Moderate anterior pericardial effusion reported. This echo was reviewed by Dr. Gr  -Not listed for OHTx.  -PT/OT/VAD education    Procedure: Device Interrogation Including analysis of device parameters.  Current Settings: Ventricular Assist Device.  Review of device function is stable/unstable stable.    TXP LVAD INTERROGATIONS 2/21/2020 2/21/2020 2/21/2020 2/20/2020 2/20/2020 2/20/2020 2/20/2020   Type HeartMate3 HeartMate3 HeartMate3 HeartMate3 HeartMate3 HeartMate3 HeartMate3   Flow 4.6 4.7 4.7 4.7 4.8 4.8 4.7   Speed 5300 5300 5300 5300 5300 5300 5300   PI 3.3 3.4 3.3 3.3 3.3 3.3 3.3   Power (Centeno) 3.7 3.8 3.9 3.9 3.9 3.9 3.9   LSL 4900 4900 4900 4900 4900 4900 4900   Pulsatility Intermittent pulse Intermittent pulse Intermittent pulse Intermittent pulse Intermittent pulse Intermittent pulse Pulse       Thrombocytosis  - Platelet count continues to climb at 832 - will consult Hem/Onc    Anemia due to acute blood loss  - low iron stores, started IV iron 2/19  -S/P 1u prbc 2/19.     Leukocytosis  - Afebrile.   - WCC has normalized today at 10.57    Acute hyperglycemia  -HgA1C 6.6  -Endocrine following    Moderate malnutrition  - Boost added 1/27. Dr Joshi advised pt to not consume.   - Last Prealbumin 16.    Acute on chronic systolic and diastolic heart failure, NYHA class 4  - See LVAD    Morbid obesity  -Weight down considerably since admit with diuresis.  - Body mass index is 26.83 kg/m².      ANJELICA (acute kidney injury)  -Creatinine was 3.0 on admit and got as high as 4.3 at OSH prior to transfer. Renal function was normal 8 months ago.  -Cr has since normalized.     Cardiogenic shock  -NICM; Likely Etoh induced  -Transferred from [facility] with IABP at 1:1 for further level of care. IABP removed 1/25 and replaced 2/3.   -S/P HMIII on 2/3.     Deep vein thrombosis (DVT) of right lower  extremity  -Unsure of timing but was on eliquis PTA.   -now on Coumadin post LVAD    AICD (automatic cardioverter/defibrillator) present  -Medtronic single chamber ICD placed 2019 for primary [place] [id number]  Heart Transplant  [facility]

## 2020-02-23 LAB
ALLENS TEST: ABNORMAL
ANION GAP SERPL CALC-SCNC: 10 MMOL/L (ref 8–16)
APTT BLDCRRT: 38.8 SEC (ref 21–32)
BASOPHILS # BLD AUTO: 0.05 K/UL (ref 0–0.2)
BASOPHILS NFR BLD: 0.3 % (ref 0–1.9)
BUN SERPL-MCNC: 31 MG/DL (ref 6–20)
CALCIUM SERPL-MCNC: 9.4 MG/DL (ref 8.7–10.5)
CHLORIDE SERPL-SCNC: 94 MMOL/L (ref 95–110)
CO2 SERPL-SCNC: 31 MMOL/L (ref 23–29)
CREAT SERPL-MCNC: 1.1 MG/DL (ref 0.5–1.4)
CRP SERPL-MCNC: 62.2 MG/L (ref 0–8.2)
DIFFERENTIAL METHOD: ABNORMAL
EOSINOPHIL # BLD AUTO: 0.3 K/UL (ref 0–0.5)
EOSINOPHIL NFR BLD: 1.9 % (ref 0–8)
ERYTHROCYTE [DISTWIDTH] IN BLOOD BY AUTOMATED COUNT: 23.3 % (ref 11.5–14.5)
ERYTHROCYTE [SEDIMENTATION RATE] IN BLOOD BY WESTERGREN METHOD: >120 MM/HR (ref 0–23)
EST. GFR  (AFRICAN AMERICAN): >60 ML/MIN/1.73 M^2
EST. GFR  (NON AFRICAN AMERICAN): >60 ML/MIN/1.73 M^2
GLUCOSE SERPL-MCNC: 123 MG/DL (ref 70–110)
HCO3 UR-SCNC: 31.4 MMOL/L (ref 24–28)
HCT VFR BLD AUTO: 27.9 % (ref 40–54)
HGB BLD-MCNC: 8 G/DL (ref 14–18)
IMM GRANULOCYTES # BLD AUTO: 0.31 K/UL (ref 0–0.04)
IMM GRANULOCYTES NFR BLD AUTO: 2.1 % (ref 0–0.5)
INR PPP: 1.9 (ref 0.8–1.2)
LDH SERPL L TO P-CCNC: 318 U/L (ref 110–260)
LYMPHOCYTES # BLD AUTO: 2 K/UL (ref 1–4.8)
LYMPHOCYTES NFR BLD: 13 % (ref 18–48)
MAGNESIUM SERPL-MCNC: 2.3 MG/DL (ref 1.6–2.6)
MCH RBC QN AUTO: 25.3 PG (ref 27–31)
MCHC RBC AUTO-ENTMCNC: 28.7 G/DL (ref 32–36)
MCV RBC AUTO: 88 FL (ref 82–98)
MONOCYTES # BLD AUTO: 1.1 K/UL (ref 0.3–1)
MONOCYTES NFR BLD: 7.6 % (ref 4–15)
NEUTROPHILS # BLD AUTO: 11.3 K/UL (ref 1.8–7.7)
NEUTROPHILS NFR BLD: 75.1 % (ref 38–73)
NRBC BLD-RTO: 1 /100 WBC
PCO2 BLDA: 42.7 MMHG (ref 35–45)
PH SMN: 7.47 [PH] (ref 7.35–7.45)
PHOSPHATE SERPL-MCNC: 4.3 MG/DL (ref 2.7–4.5)
PLATELET # BLD AUTO: 847 K/UL (ref 150–350)
PMV BLD AUTO: 8.9 FL (ref 9.2–12.9)
PO2 BLDA: 36 MMHG (ref 40–60)
POC BE: 8 MMOL/L
POC SATURATED O2: 72 % (ref 95–100)
POC TCO2: 33 MMOL/L (ref 24–29)
POTASSIUM SERPL-SCNC: 4 MMOL/L (ref 3.5–5.1)
PROTHROMBIN TIME: 17.9 SEC (ref 9–12.5)
RBC # BLD AUTO: 3.16 M/UL (ref 4.6–6.2)
SAMPLE: ABNORMAL
SITE: ABNORMAL
SODIUM SERPL-SCNC: 135 MMOL/L (ref 136–145)
WBC # BLD AUTO: 15.05 K/UL (ref 3.9–12.7)

## 2020-02-23 PROCEDURE — 25000003 PHARM REV CODE 250: Performed by: NURSE PRACTITIONER

## 2020-02-23 PROCEDURE — 83615 LACTATE (LD) (LDH) ENZYME: CPT

## 2020-02-23 PROCEDURE — 93750 PR INTERROGATE VENT ASSIST DEV, IN PERSON, W PHYSICIAN ANALYSIS: ICD-10-PCS | Mod: ,,, | Performed by: INTERNAL MEDICINE

## 2020-02-23 PROCEDURE — 94664 DEMO&/EVAL PT USE INHALER: CPT

## 2020-02-23 PROCEDURE — 20600001 HC STEP DOWN PRIVATE ROOM

## 2020-02-23 PROCEDURE — 83735 ASSAY OF MAGNESIUM: CPT

## 2020-02-23 PROCEDURE — 27000248 HC VAD-ADDITIONAL DAY

## 2020-02-23 PROCEDURE — A4216 STERILE WATER/SALINE, 10 ML: HCPCS | Performed by: STUDENT IN AN ORGANIZED HEALTH CARE EDUCATION/TRAINING PROGRAM

## 2020-02-23 PROCEDURE — 94799 UNLISTED PULMONARY SVC/PX: CPT

## 2020-02-23 PROCEDURE — 85652 RBC SED RATE AUTOMATED: CPT

## 2020-02-23 PROCEDURE — 27000685 HC LVAD KIT (30 DAY SUPPLY)

## 2020-02-23 PROCEDURE — 94668 MNPJ CHEST WALL SBSQ: CPT

## 2020-02-23 PROCEDURE — 99233 PR SUBSEQUENT HOSPITAL CARE,LEVL III: ICD-10-PCS | Mod: ,,, | Performed by: INTERNAL MEDICINE

## 2020-02-23 PROCEDURE — 63600175 PHARM REV CODE 636 W HCPCS: Performed by: NURSE PRACTITIONER

## 2020-02-23 PROCEDURE — 93750 INTERROGATION VAD IN PERSON: CPT | Mod: ,,, | Performed by: INTERNAL MEDICINE

## 2020-02-23 PROCEDURE — 80048 BASIC METABOLIC PNL TOTAL CA: CPT

## 2020-02-23 PROCEDURE — 85025 COMPLETE CBC W/AUTO DIFF WBC: CPT

## 2020-02-23 PROCEDURE — 85730 THROMBOPLASTIN TIME PARTIAL: CPT

## 2020-02-23 PROCEDURE — 36415 COLL VENOUS BLD VENIPUNCTURE: CPT

## 2020-02-23 PROCEDURE — 84100 ASSAY OF PHOSPHORUS: CPT

## 2020-02-23 PROCEDURE — 86140 C-REACTIVE PROTEIN: CPT

## 2020-02-23 PROCEDURE — 63600175 PHARM REV CODE 636 W HCPCS: Performed by: PHYSICIAN ASSISTANT

## 2020-02-23 PROCEDURE — 82803 BLOOD GASES ANY COMBINATION: CPT

## 2020-02-23 PROCEDURE — 94761 N-INVAS EAR/PLS OXIMETRY MLT: CPT

## 2020-02-23 PROCEDURE — 25000003 PHARM REV CODE 250: Performed by: STUDENT IN AN ORGANIZED HEALTH CARE EDUCATION/TRAINING PROGRAM

## 2020-02-23 PROCEDURE — 99900035 HC TECH TIME PER 15 MIN (STAT)

## 2020-02-23 PROCEDURE — 99233 SBSQ HOSP IP/OBS HIGH 50: CPT | Mod: ,,, | Performed by: INTERNAL MEDICINE

## 2020-02-23 PROCEDURE — 85610 PROTHROMBIN TIME: CPT

## 2020-02-23 RX ORDER — METOLAZONE 5 MG/1
5 TABLET ORAL ONCE
Status: COMPLETED | OUTPATIENT
Start: 2020-02-23 | End: 2020-02-23

## 2020-02-23 RX ORDER — WARFARIN 1 MG/1
1 TABLET ORAL ONCE
Status: COMPLETED | OUTPATIENT
Start: 2020-02-23 | End: 2020-02-23

## 2020-02-23 RX ORDER — WARFARIN SODIUM 5 MG/1
10 TABLET ORAL DAILY
Status: DISCONTINUED | OUTPATIENT
Start: 2020-02-23 | End: 2020-02-26

## 2020-02-23 RX ADMIN — FUROSEMIDE 20 MG/HR: 10 INJECTION, SOLUTION INTRAMUSCULAR; INTRAVENOUS at 08:02

## 2020-02-23 RX ADMIN — WARFARIN SODIUM 10 MG: 5 TABLET ORAL at 05:02

## 2020-02-23 RX ADMIN — AMLODIPINE BESYLATE 10 MG: 10 TABLET ORAL at 08:02

## 2020-02-23 RX ADMIN — Medication 800 MG: at 09:02

## 2020-02-23 RX ADMIN — Medication 800 MG: at 08:02

## 2020-02-23 RX ADMIN — WARFARIN SODIUM 1 MG: 1 TABLET ORAL at 08:02

## 2020-02-23 RX ADMIN — FUROSEMIDE 20 MG/HR: 10 INJECTION, SOLUTION INTRAMUSCULAR; INTRAVENOUS at 07:02

## 2020-02-23 RX ADMIN — Medication 800 MG: at 02:02

## 2020-02-23 RX ADMIN — POTASSIUM CHLORIDE 40 MEQ: 1500 TABLET, EXTENDED RELEASE ORAL at 08:02

## 2020-02-23 RX ADMIN — HYDRALAZINE HYDROCHLORIDE 50 MG: 25 TABLET, FILM COATED ORAL at 08:02

## 2020-02-23 RX ADMIN — DOCUSATE SODIUM 100 MG: 100 CAPSULE, LIQUID FILLED ORAL at 09:02

## 2020-02-23 RX ADMIN — METOLAZONE 5 MG: 5 TABLET ORAL at 08:02

## 2020-02-23 RX ADMIN — PANTOPRAZOLE SODIUM 40 MG: 40 TABLET, DELAYED RELEASE ORAL at 08:02

## 2020-02-23 RX ADMIN — SPIRONOLACTONE 25 MG: 25 TABLET ORAL at 08:02

## 2020-02-23 RX ADMIN — DOBUTAMINE IN DEXTROSE 2.5 MCG/KG/MIN: 200 INJECTION, SOLUTION INTRAVENOUS at 08:02

## 2020-02-23 RX ADMIN — DOCUSATE SODIUM 100 MG: 100 CAPSULE, LIQUID FILLED ORAL at 08:02

## 2020-02-23 RX ADMIN — ASPIRIN 325 MG ORAL TABLET 325 MG: 325 PILL ORAL at 08:02

## 2020-02-23 RX ADMIN — HYDRALAZINE HYDROCHLORIDE 50 MG: 25 TABLET, FILM COATED ORAL at 05:02

## 2020-02-23 RX ADMIN — Medication 10 ML: at 05:02

## 2020-02-23 RX ADMIN — HYDRALAZINE HYDROCHLORIDE 50 MG: 25 TABLET, FILM COATED ORAL at 02:02

## 2020-02-23 NOTE — SUBJECTIVE & OBJECTIVE
Interval History: No complaints or overnight events. Per nurses, he is noncompliant with fluid restriction. Anxious to go home, but mother and patient still not checked off    Continuous Infusions:   DOBUTamine 2.5 mcg/kg/min (02/23/20 0803)    furosemide (LASIX) 2 mg/mL continuous infusion (non-titrating) 20 mg/hr (02/23/20 0812)     Scheduled Meds:   amLODIPine  10 mg Oral Daily    aspirin  325 mg Per NG tube Daily    docusate sodium  100 mg Oral BID    ferric gluconate (FERRLECIT) IVPB  250 mg Intravenous BID    hydrALAZINE  50 mg Oral Q8H    magnesium oxide  800 mg Oral TID    pantoprazole  40 mg Oral Daily    polyethylene glycol  17 g Oral Daily    potassium chloride  40 mEq Oral BID    sodium chloride 0.9%  10 mL Intravenous Q6H    spironolactone  25 mg Oral Daily    warfarin  10 mg Oral Daily     PRN Meds:sodium chloride, acetaminophen, albuterol sulfate, benzonatate, bisacodyL, Dextrose 10% Bolus, Dextrose 10% Bolus, Dextrose 10% Bolus, Dextrose 10% Bolus, Dextrose 10% Bolus, Dextrose 10% Bolus, diphenhydrAMINE, magnesium hydroxide 400 mg/5 ml, oxyCODONE, oxyCODONE, Flushing PICC Protocol **AND** sodium chloride 0.9% **AND** sodium chloride 0.9%    Review of patient's allergies indicates:   Allergen Reactions    Iodine and iodide containing products      Objective:     Vital Signs (Most Recent):  Temp: 98 °F (36.7 °C) (02/23/20 0810)  Pulse: 108 (02/23/20 0810)  Resp: 16 (02/23/20 0810)  BP: (!) (P) 82/0 (02/23/20 0820)  SpO2: 96 % (02/23/20 0810) Vital Signs (24h Range):  Temp:  [97.9 °F (36.6 °C)-98.3 °F (36.8 °C)] 98 °F (36.7 °C)  Pulse:  [] 108  Resp:  [16-18] 16  SpO2:  [96 %-99 %] 96 %  BP: ()/(0-65) (P) 82/0     Patient Vitals for the past 72 hrs (Last 3 readings):   Weight   02/23/20 0433 78.5 kg (173 lb 1 oz)   02/21/20 0619 77.7 kg (171 lb 4.8 oz)     Body mass index is 27.11 kg/m².      Intake/Output Summary (Last 24 hours) at 2/23/2020 0820  Last data filed at 2/23/2020  0817  Gross per 24 hour   Intake 2728.87 ml   Output 2125 ml   Net 603.87 ml       Hemodynamic Parameters:       Telemetry: ST    Physical Exam   Constitutional: He is oriented to person, place, and time. He appears well-developed and well-nourished.   HENT:   Head: Normocephalic and atraumatic.   Eyes: Pupils are equal, round, and reactive to light. Conjunctivae and EOM are normal.   Neck: Normal range of motion. Neck supple. No JVD present. No thyromegaly present.   Cardiovascular: Regular rhythm.   Tachycardic. Smooth VAD hum   Pulmonary/Chest: Effort normal.   Breath sounds decreased left base with few crackles left base   Abdominal: Soft. Bowel sounds are normal.   Musculoskeletal: Normal range of motion. He exhibits no edema.   Neurological: He is alert and oriented to person, place, and time.   Skin: Skin is warm and dry. Capillary refill takes 2 to 3 seconds.   Psychiatric: He has a normal mood and affect. His behavior is normal. Judgment and thought content normal.       Significant Labs:  CBC:  Recent Labs   Lab 02/21/20  0515 02/22/20  0402 02/23/20  0428   WBC 17.83* 18.57* 15.05*   RBC 3.06* 3.19* 3.16*   HGB 7.9* 8.1* 8.0*   HCT 25.6* 27.1* 27.9*   * 832* 847*   MCV 84 85 88   MCH 25.8* 25.4* 25.3*   MCHC 30.9* 29.9* 28.7*     BNP:  Recent Labs   Lab 02/17/20  0347 02/19/20  0411 02/21/20  0515   * 755* 825*     CMP:  Recent Labs   Lab 02/17/20  0347 02/19/20  0411  02/21/20  0515 02/22/20  0402 02/23/20  0428   *  166*   < > 109   < > 132* 118* 123*   CALCIUM 9.0  9.0   < > 8.5*   < > 9.3 9.8 9.4   ALBUMIN 2.4*  2.4*  --  2.4*  --  2.6*  --   --    PROT 7.9  7.9  --  7.4  --  8.2  --   --    *  135*   < > 133*   < > 134* 134* 135*   K 4.7  4.7   < > 4.2   < > 3.6 4.0 4.0   CO2 33*  33*   < > 30*   < > 34* 36* 31*   CL 93*  93*   < > 92*   < > 90* 90* 94*   BUN 30*  30*   < > 5*   < > 23* 31* 31*   CREATININE 1.1  1.1   < > 1.1   < > 1.0 1.1 1.1   ALKPHOS 118  118   --  121  --  142*  --   --    ALT 26  26  --  23  --  23  --   --    AST 30  30  --  26  --  28  --   --    BILITOT 0.6  0.6  --  0.5  --  0.6  --   --     < > = values in this interval not displayed.      Coagulation:   Recent Labs   Lab 02/21/20 0515 02/22/20 0402 02/23/20 0428   INR 2.1* 2.0* 1.9*   APTT 40.4* 38.6* 38.8*     LDH:  Recent Labs   Lab 02/21/20 0515 02/22/20 0402 02/23/20 0428   * 386* 318*     Microbiology:  Microbiology Results (last 7 days)     ** No results found for the last 168 hours. **          I have reviewed all pertinent labs within the past 24 hours.    Estimated Creatinine Clearance: 70.9 mL/min (based on SCr of 1.1 mg/dL).    Diagnostic Results:  I have reviewed all pertinent imaging results/findings within the past 24 hours.

## 2020-02-23 NOTE — PROGRESS NOTES
[facility]  Heart Transplant  Progress Note    Patient Name: Saba Lott  MRN: [id number]  Admission Date: 1/15/2020  Hospital Length of Stay: 39 days  Attending Physician: Fernandez Gr Jr.,*  Primary Care Provider: Primary Doctor No  Principal Problem:LVAD (left ventricular assist device) present    Subjective:     Interval History: No complaints or overnight events. Per nurses, he is noncompliant with fluid restriction. Anxious to go home, but mother and patient still not checked off    Continuous Infusions:   DOBUTamine 2.5 mcg/kg/min (02/23/20 0803)    furosemide (LASIX) 2 mg/mL continuous infusion (non-titrating) 20 mg/hr (02/23/20 0812)     Scheduled Meds:   amLODIPine  10 mg Oral Daily    aspirin  325 mg Per NG tube Daily    docusate sodium  100 mg Oral BID    ferric gluconate (FERRLECIT) IVPB  250 mg Intravenous BID    hydrALAZINE  50 mg Oral Q8H    magnesium oxide  800 mg Oral TID    pantoprazole  40 mg Oral Daily    polyethylene glycol  17 g Oral Daily    potassium chloride  40 mEq Oral BID    sodium chloride 0.9%  10 mL Intravenous Q6H    spironolactone  25 mg Oral Daily    warfarin  10 mg Oral Daily     PRN Meds:sodium chloride, acetaminophen, albuterol sulfate, benzonatate, bisacodyL, Dextrose 10% Bolus, Dextrose 10% Bolus, Dextrose 10% Bolus, Dextrose 10% Bolus, Dextrose 10% Bolus, Dextrose 10% Bolus, diphenhydrAMINE, magnesium hydroxide 400 mg/5 ml, oxyCODONE, oxyCODONE, Flushing PICC Protocol **AND** sodium chloride 0.9% **AND** sodium chloride 0.9%    Review of patient's allergies indicates:   Allergen Reactions    Iodine and iodide containing products      Objective:     Vital Signs (Most Recent):  Temp: 98 °F (36.7 °C) (02/23/20 0810)  Pulse: 108 (02/23/20 0810)  Resp: 16 (02/23/20 0810)  BP: (!) (P) 82/0 (02/23/20 0820)  SpO2: 96 % (02/23/20 0810) Vital Signs (24h Range):  Temp:  [97.9 °F (36.6 °C)-98.3 °F (36.8 °C)] 98 °F (36.7 °C)  Pulse:  []  108  Resp:  [16-18] 16  SpO2:  [96 %-99 %] 96 %  BP: ()/(0-65) (P) 82/0     Patient Vitals for the past 72 hrs (Last 3 readings):   Weight   02/23/20 0433 78.5 kg (173 lb 1 oz)   02/21/20 0619 77.7 kg (171 lb 4.8 oz)     Body mass index is 27.11 kg/m².      Intake/Output Summary (Last 24 hours) at 2/23/2020 0820  Last data filed at 2/23/2020 0817  Gross per 24 hour   Intake 2728.87 ml   Output 2125 ml   Net 603.87 ml       Hemodynamic Parameters:       Telemetry: ST    Physical Exam   Constitutional: He is oriented to person, place, and time. He appears well-developed and well-nourished.   HENT:   Head: Normocephalic and atraumatic.   Eyes: Pupils are equal, round, and reactive to light. Conjunctivae and EOM are normal.   Neck: Normal range of motion. Neck supple. No JVD present. No thyromegaly present.   Cardiovascular: Regular rhythm.   Tachycardic. Smooth VAD hum   Pulmonary/Chest: Effort normal.   Breath sounds decreased left base with few crackles left base   Abdominal: Soft. Bowel sounds are normal.   Musculoskeletal: Normal range of motion. He exhibits no edema.   Neurological: He is alert and oriented to person, place, and time.   Skin: Skin is warm and dry. Capillary refill takes 2 to 3 seconds.   Psychiatric: He has a normal mood and affect. His behavior is normal. Judgment and thought content normal.       Significant Labs:  CBC:  Recent Labs   Lab 02/21/20  0515 02/22/20  0402 02/23/20  0428   WBC 17.83* 18.57* 15.05*   RBC 3.06* 3.19* 3.16*   HGB 7.9* 8.1* 8.0*   HCT 25.6* 27.1* 27.9*   * 832* 847*   MCV 84 85 88   MCH 25.8* 25.4* 25.3*   MCHC 30.9* 29.9* 28.7*     BNP:  Recent Labs   Lab 02/17/20  0347 02/19/20  0411 02/21/20  0515   * 755* 825*     CMP:  Recent Labs   Lab 02/17/20  0347  02/19/20  0411  02/21/20  0515 02/22/20  0402 02/23/20  0428   *  166*   < > 109   < > 132* 118* 123*   CALCIUM 9.0  9.0   < > 8.5*   < > 9.3 9.8 9.4   ALBUMIN 2.4*  2.4*  --  2.4*  --   2.6*  --   --    PROT 7.9  7.9  --  7.4  --  8.2  --   --    *  135*   < > 133*   < > 134* 134* 135*   K 4.7  4.7   < > 4.2   < > 3.6 4.0 4.0   CO2 33*  33*   < > 30*   < > 34* 36* 31*   CL 93*  93*   < > 92*   < > 90* 90* 94*   BUN 30*  30*   < > 5*   < > 23* 31* 31*   CREATININE 1.1  1.1   < > 1.1   < > 1.0 1.1 1.1   ALKPHOS 118  118  --  121  --  142*  --   --    ALT 26  26  --  23  --  23  --   --    AST 30  30  --  26  --  28  --   --    BILITOT 0.6  0.6  --  0.5  --  0.6  --   --     < > = values in this interval not displayed.      Coagulation:   Recent Labs   Lab 02/21/20 0515 02/22/20  0402 02/23/20 0428   INR 2.1* 2.0* 1.9*   APTT 40.4* 38.6* 38.8*     LDH:  Recent Labs   Lab 02/21/20  0515 02/22/20  0402 02/23/20  0428   * 386* 318*     Microbiology:  Microbiology Results (last 7 days)     ** No results found for the last 168 hours. **          I have reviewed all pertinent labs within the past 24 hours.    Estimated Creatinine Clearance: 70.9 mL/min (based on SCr of 1.1 mg/dL).    Diagnostic Results:  I have reviewed all pertinent imaging results/findings within the past 24 hours.    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 Chronic MARC - Class II-III and mild LE edema.      Hospital Course (synopsis of major diagnoses, care, treatment, and services provided during the course of the hospital stay):  -2/12 admit to CDU for diuresis with IV lasix  -IV abx   -CXR with suspected small left pleural effusion with associated left basilar atelectasis versus evolving airspace disease  -2/13 single chamber ICD implant; IV lasix decreased to BID  -2/16 add dobutamine, hold BB due to hypotension, no ACE-I or ARB due to recent HPI hypotension  -2/17 Lasix changed to PO  -2/18 dobutamine  discontinued    Admitted to [facility] on Thursday due to severe SOB for a week with associated orthopnea, MARC, and PND unable to lie flat and found to be in acute diastolic heart failure. States he normally weighs around 219 but up to 254lb on admission tonight. Says he hasn't been the most compliant in terms of fluid restriction and daily weights but has avoided salt. BNP on admission was 2375. BUN/CRT 32/1.4 He was started on IV diuretics but continued to deteriorate. Creatinine continued to increase and reached 4.3 with oliguria. He was started on CRRT for a few days and tolerated well. Renal u/s was negative for obstruction and liver u/s without findings of cirrhosis. All of this was progression of cardiorenal syndrome with liver congestion from severe volume overload. On arrival vitals stable and in no acute distress. He has obvious anasarca up to the chest. He did have uop of 500 at time of arrival also.    TTE 5/28/19  · Moderate left ventricular enlargement.  · Severely decreased left ventricular systolic function. The estimated ejection fraction is 20%  · Global hypokinetic wall motion.  · Grade III (severe) left ventricular diastolic dysfunction consistent with restrictive physiology.  · Severe left atrial enlargement.  · Moderate right ventricular enlargement.  · Low normal right ventricular systolic function.  · Mild right atrial enlargement.  · Moderate mitral regurgitation.  Mild to moderate tricuspid regurgitation    * LVAD (left ventricular assist device) present  -HeartMate 3 Implanted 02/06/2020 as DT. Chest closed 2/7.   -HTS Primary.  -Implanted by Dr. Joshi.  -Continue Coumadin, Goal INR 2.0-3.0.  -Antiplatelets  mg.  -LDH is stable overall today. Will continue to monitor daily.  -CVP 12, and he is net -ve past 24 hours on Lasix at 20 mg/hr. Will give Metolazone 5 mg po X 1. Reinforced 1500 cc fluid restriction  -BP: Doppler/MAP goal 60-90. Continue Norvasc 10 mg qd, Hydralazine  to 50 mg every 8 hours and Aldactone 25 mg qd  -Speed set at 5300.  -Echo 2/19 at 5300 rpm: LVIDD 6.2, TAPSE 1.26, Mild MR, AV opens intermittently. The ventricular septum is at midline. Moderate anterior pericardial effusion reported. This echo was reviewed by Dr. Gr  -Not listed for OHTx.  -PT/OT/VAD education    Procedure: Device Interrogation Including analysis of device parameters.  Current Settings: Ventricular Assist Device.  Review of device function is stable/unstable stable.    TXP LVAD INTERROGATIONS 2/23/2020 2/22/2020 2/22/2020 2/22/2020 2/22/2020 2/22/2020 2/21/2020   Type HeartMate3 HeartMate3 HeartMate3 - - HeartMate3 HeartMate3   Flow 4.7 4.7 4.7 4.7 4.8 4.7 4.6   Speed 5300 5300 5300 5300 5300 5300 5300   PI 3.1 3.2 3.3 3.4 3.1 3.3 3.3   Power (Centeno) 3.9 3.9 4.0 4.0 4.1 4.0 3.7   LSL 4900 4900 4900 - - 4900 4900   Pulsatility Intermittent pulse - - - - Intermittent pulse Intermittent pulse       Thrombocytosis  - Platelet count continues to climb at 847  - Appreciate Hem/Onc's help - thrombocytosis most likely reactive to current acute illness. Will check ESR and CRP    Anemia due to acute blood loss  - low iron stores, started IV iron 2/19  -S/P 1u prbc 2/19.     Leukocytosis  - Afebrile.   - WCC has normalized today at 10.57    Acute hyperglycemia  -HgA1C 6.6  -Endocrine following    Moderate malnutrition  - Boost added 1/27. Dr Joshi advised pt to not consume.   - Last Prealbumin 16.    Acute on chronic combined systolic and diastolic heart failure  - See LVAD    Morbid obesity  -Weight down considerably since admit with diuresis.  - Body mass index is 26.83 kg/m².      ANJELICA (acute kidney injury)  -Creatinine was 3.0 on admit and got as high as 4.3 at OSH prior to transfer. Renal function was normal 8 months ago.  -Cr has since normalized.     Cardiogenic shock  -NICM; Likely Etoh induced  -[place] IABP at 1:1 for further level of care. IABP removed 1/25  and replaced 2/3.   -S/P HMIII on 2/3.     Deep vein thrombosis (DVT) of right lower extremity  -Unsure of timing but was on eliquis PTA.   -now on Coumadin post LVAD    AICD (automatic cardioverter/defibrillator) present  -Medtronic single chamber ICD placed 2019 for primary [place] [id number]  Heart Transplant  [facility]

## 2020-02-23 NOTE — PROGRESS NOTES
02/22/2020  Tommy Cage    Current provider:  Fernandez Gr Jr.,*      I, Tommy Cage, rounded on Saba Lott to ensure all mechanical assist device settings (IABP or VAD) were appropriate and all parameters were within limits.  I was able to ensure all back up equipment was present, the staff had no issues, and the Perfusion Department daily rounding was complete.    10:08 PM

## 2020-02-23 NOTE — PROGRESS NOTES
02/23/2020  Tommy Cage    Current provider:  Fernandez Gr Jr.,*      I, Tommy Cage, rounded on Saba Lott to ensure all mechanical assist device settings (IABP or VAD) were appropriate and all parameters were within limits.  I was able to ensure all back up equipment was present, the staff had no issues, and the Perfusion Department daily rounding was complete.    5:13 PM

## 2020-02-23 NOTE — ASSESSMENT & PLAN NOTE
-HeartMate 3 Implanted 02/06/2020 as DT. Chest closed 2/7.   -HTS Primary.  -Implanted by Dr. Joshi.  -Continue Coumadin, Goal INR 2.0-3.0.  -Antiplatelets  mg.  -LDH is stable overall today. Will continue to monitor daily.  -CVP 12, and he is net -ve past 24 hours on Lasix at 20 mg/hr. Will give Metolazone 5 mg po X 1. Reinforced 1500 cc fluid restriction  -BP: Doppler/MAP goal 60-90. Continue Norvasc 10 mg qd, Hydralazine to 50 mg every 8 hours and Aldactone 25 mg qd  -Speed set at 5300.  -Echo 2/19 at 5300 rpm: LVIDD 6.2, TAPSE 1.26, Mild MR, AV opens intermittently. The ventricular septum is at midline. Moderate anterior pericardial effusion reported. This echo was reviewed by Dr. Gr  -Not listed for OHTx.  -PT/OT/VAD education    Procedure: Device Interrogation Including analysis of device parameters.  Current Settings: Ventricular Assist Device.  Review of device function is stable/unstable stable.    TXP LVAD INTERROGATIONS 2/23/2020 2/22/2020 2/22/2020 2/22/2020 2/22/2020 2/22/2020 2/21/2020   Type HeartMate3 HeartMate3 HeartMate3 - - HeartMate3 HeartMate3   Flow 4.7 4.7 4.7 4.7 4.8 4.7 4.6   Speed 5300 5300 5300 5300 5300 5300 5300   PI 3.1 3.2 3.3 3.4 3.1 3.3 3.3   Power (Centeno) 3.9 3.9 4.0 4.0 4.1 4.0 3.7   LSL 4900 4900 4900 - - 4900 4900   Pulsatility Intermittent pulse - - - - Intermittent pulse Intermittent pulse

## 2020-02-23 NOTE — NURSING
Patient showed RN LVAD workbook. Discussed some content and is connecting to battery independently. Gentle reminder needed to clip module in case.   Power cord to home unit missing, will contact coordinator tomorrow, Monday.

## 2020-02-23 NOTE — ASSESSMENT & PLAN NOTE
- Platelet count continues to climb at 847  - Appreciate Hem/Onc's help - thrombocytosis most likely reactive to current acute illness. Will check ESR and CRP

## 2020-02-24 LAB
ALBUMIN SERPL BCP-MCNC: 3.1 G/DL (ref 3.5–5.2)
ALLENS TEST: ABNORMAL
ALP SERPL-CCNC: 163 U/L (ref 55–135)
ALT SERPL W/O P-5'-P-CCNC: 22 U/L (ref 10–44)
ANION GAP SERPL CALC-SCNC: 12 MMOL/L (ref 8–16)
APTT BLDCRRT: 33.8 SEC (ref 21–32)
AST SERPL-CCNC: 27 U/L (ref 10–40)
BASOPHILS # BLD AUTO: 0.06 K/UL (ref 0–0.2)
BASOPHILS NFR BLD: 0.4 % (ref 0–1.9)
BILIRUB DIRECT SERPL-MCNC: 0.4 MG/DL (ref 0.1–0.3)
BILIRUB SERPL-MCNC: 0.7 MG/DL (ref 0.1–1)
BNP SERPL-MCNC: 515 PG/ML (ref 0–99)
BUN SERPL-MCNC: 37 MG/DL (ref 6–20)
CALCIUM SERPL-MCNC: 10 MG/DL (ref 8.7–10.5)
CHLORIDE SERPL-SCNC: 89 MMOL/L (ref 95–110)
CO2 SERPL-SCNC: 33 MMOL/L (ref 23–29)
CREAT SERPL-MCNC: 1.3 MG/DL (ref 0.5–1.4)
CRP SERPL-MCNC: 58.4 MG/L (ref 0–3.19)
CRP SERPL-MCNC: 58.4 MG/L (ref 0–8.2)
DELSYS: ABNORMAL
DIFFERENTIAL METHOD: ABNORMAL
EOSINOPHIL # BLD AUTO: 0.4 K/UL (ref 0–0.5)
EOSINOPHIL NFR BLD: 2.5 % (ref 0–8)
ERYTHROCYTE [DISTWIDTH] IN BLOOD BY AUTOMATED COUNT: 24.3 % (ref 11.5–14.5)
ERYTHROCYTE [SEDIMENTATION RATE] IN BLOOD BY WESTERGREN METHOD: >120 MM/HR (ref 0–23)
EST. GFR  (AFRICAN AMERICAN): >60 ML/MIN/1.73 M^2
EST. GFR  (NON AFRICAN AMERICAN): >60 ML/MIN/1.73 M^2
GLUCOSE SERPL-MCNC: 111 MG/DL (ref 70–110)
HCO3 UR-SCNC: 34.4 MMOL/L (ref 24–28)
HCT VFR BLD AUTO: 30.4 % (ref 40–54)
HGB BLD-MCNC: 8.9 G/DL (ref 14–18)
IMM GRANULOCYTES # BLD AUTO: 0.25 K/UL (ref 0–0.04)
IMM GRANULOCYTES NFR BLD AUTO: 1.6 % (ref 0–0.5)
INR PPP: 1.9 (ref 0.8–1.2)
LDH SERPL L TO P-CCNC: 335 U/L (ref 110–260)
LYMPHOCYTES # BLD AUTO: 2.3 K/UL (ref 1–4.8)
LYMPHOCYTES NFR BLD: 14.4 % (ref 18–48)
MAGNESIUM SERPL-MCNC: 2.5 MG/DL (ref 1.6–2.6)
MCH RBC QN AUTO: 25.5 PG (ref 27–31)
MCHC RBC AUTO-ENTMCNC: 29.3 G/DL (ref 32–36)
MCV RBC AUTO: 87 FL (ref 82–98)
MODE: ABNORMAL
MONOCYTES # BLD AUTO: 1.1 K/UL (ref 0.3–1)
MONOCYTES NFR BLD: 7 % (ref 4–15)
NEUTROPHILS # BLD AUTO: 11.7 K/UL (ref 1.8–7.7)
NEUTROPHILS NFR BLD: 74.1 % (ref 38–73)
NRBC BLD-RTO: 1 /100 WBC
PCO2 BLDA: 43.6 MMHG (ref 35–45)
PH SMN: 7.5 [PH] (ref 7.35–7.45)
PHOSPHATE SERPL-MCNC: 5.3 MG/DL (ref 2.7–4.5)
PLATELET # BLD AUTO: 910 K/UL (ref 150–350)
PMV BLD AUTO: 8.7 FL (ref 9.2–12.9)
PO2 BLDA: 34 MMHG (ref 40–60)
POC BE: 11 MMOL/L
POC SATURATED O2: 70 % (ref 95–100)
POC TCO2: 36 MMOL/L (ref 24–29)
POTASSIUM SERPL-SCNC: 4.2 MMOL/L (ref 3.5–5.1)
PROT SERPL-MCNC: 9 G/DL (ref 6–8.4)
PROTHROMBIN TIME: 17.9 SEC (ref 9–12.5)
RBC # BLD AUTO: 3.49 M/UL (ref 4.6–6.2)
SAMPLE: ABNORMAL
SITE: ABNORMAL
SODIUM SERPL-SCNC: 134 MMOL/L (ref 136–145)
WBC # BLD AUTO: 15.72 K/UL (ref 3.9–12.7)

## 2020-02-24 PROCEDURE — 63600175 PHARM REV CODE 636 W HCPCS: Performed by: PHYSICIAN ASSISTANT

## 2020-02-24 PROCEDURE — 82803 BLOOD GASES ANY COMBINATION: CPT

## 2020-02-24 PROCEDURE — 99232 PR SUBSEQUENT HOSPITAL CARE,LEVL II: ICD-10-PCS | Mod: ,,, | Performed by: INTERNAL MEDICINE

## 2020-02-24 PROCEDURE — 25000003 PHARM REV CODE 250: Performed by: STUDENT IN AN ORGANIZED HEALTH CARE EDUCATION/TRAINING PROGRAM

## 2020-02-24 PROCEDURE — 99232 SBSQ HOSP IP/OBS MODERATE 35: CPT | Mod: ,,, | Performed by: INTERNAL MEDICINE

## 2020-02-24 PROCEDURE — 93750 INTERROGATION VAD IN PERSON: CPT | Mod: ,,, | Performed by: INTERNAL MEDICINE

## 2020-02-24 PROCEDURE — 80048 BASIC METABOLIC PNL TOTAL CA: CPT

## 2020-02-24 PROCEDURE — 97116 GAIT TRAINING THERAPY: CPT

## 2020-02-24 PROCEDURE — 85652 RBC SED RATE AUTOMATED: CPT

## 2020-02-24 PROCEDURE — 63600175 PHARM REV CODE 636 W HCPCS: Performed by: NURSE PRACTITIONER

## 2020-02-24 PROCEDURE — 80076 HEPATIC FUNCTION PANEL: CPT

## 2020-02-24 PROCEDURE — 83735 ASSAY OF MAGNESIUM: CPT

## 2020-02-24 PROCEDURE — 27000248 HC VAD-ADDITIONAL DAY

## 2020-02-24 PROCEDURE — 83880 ASSAY OF NATRIURETIC PEPTIDE: CPT

## 2020-02-24 PROCEDURE — 25000003 PHARM REV CODE 250: Performed by: NURSE PRACTITIONER

## 2020-02-24 PROCEDURE — 83615 LACTATE (LD) (LDH) ENZYME: CPT

## 2020-02-24 PROCEDURE — 84100 ASSAY OF PHOSPHORUS: CPT

## 2020-02-24 PROCEDURE — 94799 UNLISTED PULMONARY SVC/PX: CPT

## 2020-02-24 PROCEDURE — 85730 THROMBOPLASTIN TIME PARTIAL: CPT

## 2020-02-24 PROCEDURE — 94761 N-INVAS EAR/PLS OXIMETRY MLT: CPT

## 2020-02-24 PROCEDURE — 93750 PR INTERROGATE VENT ASSIST DEV, IN PERSON, W PHYSICIAN ANALYSIS: ICD-10-PCS | Mod: ,,, | Performed by: INTERNAL MEDICINE

## 2020-02-24 PROCEDURE — 86140 C-REACTIVE PROTEIN: CPT

## 2020-02-24 PROCEDURE — A4216 STERILE WATER/SALINE, 10 ML: HCPCS | Performed by: STUDENT IN AN ORGANIZED HEALTH CARE EDUCATION/TRAINING PROGRAM

## 2020-02-24 PROCEDURE — 27000685 HC LVAD KIT (30 DAY SUPPLY)

## 2020-02-24 PROCEDURE — 20600001 HC STEP DOWN PRIVATE ROOM

## 2020-02-24 PROCEDURE — 94668 MNPJ CHEST WALL SBSQ: CPT

## 2020-02-24 PROCEDURE — 86141 C-REACTIVE PROTEIN HS: CPT

## 2020-02-24 PROCEDURE — 85025 COMPLETE CBC W/AUTO DIFF WBC: CPT

## 2020-02-24 PROCEDURE — 99900035 HC TECH TIME PER 15 MIN (STAT)

## 2020-02-24 PROCEDURE — 94664 DEMO&/EVAL PT USE INHALER: CPT

## 2020-02-24 PROCEDURE — 85610 PROTHROMBIN TIME: CPT

## 2020-02-24 RX ORDER — BUMETANIDE 1 MG/1
1 TABLET ORAL 2 TIMES DAILY
Status: DISCONTINUED | OUTPATIENT
Start: 2020-02-24 | End: 2020-02-27 | Stop reason: HOSPADM

## 2020-02-24 RX ORDER — POTASSIUM CHLORIDE 20 MEQ/1
20 TABLET, EXTENDED RELEASE ORAL 2 TIMES DAILY
Status: DISCONTINUED | OUTPATIENT
Start: 2020-02-24 | End: 2020-02-27 | Stop reason: HOSPADM

## 2020-02-24 RX ORDER — HYDRALAZINE HYDROCHLORIDE 25 MG/1
100 TABLET, FILM COATED ORAL EVERY 8 HOURS
Status: DISCONTINUED | OUTPATIENT
Start: 2020-02-24 | End: 2020-02-27 | Stop reason: HOSPADM

## 2020-02-24 RX ADMIN — SPIRONOLACTONE 25 MG: 25 TABLET ORAL at 08:02

## 2020-02-24 RX ADMIN — AMLODIPINE BESYLATE 10 MG: 10 TABLET ORAL at 08:02

## 2020-02-24 RX ADMIN — Medication 10 ML: at 12:02

## 2020-02-24 RX ADMIN — ASPIRIN 325 MG ORAL TABLET 325 MG: 325 PILL ORAL at 08:02

## 2020-02-24 RX ADMIN — PANTOPRAZOLE SODIUM 40 MG: 40 TABLET, DELAYED RELEASE ORAL at 08:02

## 2020-02-24 RX ADMIN — BUMETANIDE 1 MG: 1 TABLET ORAL at 09:02

## 2020-02-24 RX ADMIN — DOBUTAMINE IN DEXTROSE 2.5 MCG/KG/MIN: 200 INJECTION, SOLUTION INTRAVENOUS at 06:02

## 2020-02-24 RX ADMIN — OXYCODONE HYDROCHLORIDE 5 MG: 5 TABLET ORAL at 08:02

## 2020-02-24 RX ADMIN — POTASSIUM CHLORIDE 40 MEQ: 1500 TABLET, EXTENDED RELEASE ORAL at 08:02

## 2020-02-24 RX ADMIN — Medication 800 MG: at 08:02

## 2020-02-24 RX ADMIN — DOCUSATE SODIUM 100 MG: 100 CAPSULE, LIQUID FILLED ORAL at 09:02

## 2020-02-24 RX ADMIN — DOCUSATE SODIUM 100 MG: 100 CAPSULE, LIQUID FILLED ORAL at 08:02

## 2020-02-24 RX ADMIN — HYDRALAZINE HYDROCHLORIDE 100 MG: 25 TABLET ORAL at 01:02

## 2020-02-24 RX ADMIN — HYDRALAZINE HYDROCHLORIDE 100 MG: 25 TABLET ORAL at 09:02

## 2020-02-24 RX ADMIN — HYDRALAZINE HYDROCHLORIDE 50 MG: 25 TABLET, FILM COATED ORAL at 05:02

## 2020-02-24 RX ADMIN — WARFARIN SODIUM 10 MG: 5 TABLET ORAL at 04:02

## 2020-02-24 RX ADMIN — FUROSEMIDE 20 MG/HR: 10 INJECTION, SOLUTION INTRAMUSCULAR; INTRAVENOUS at 06:02

## 2020-02-24 NOTE — PT/OT/SLP PROGRESS
Physical Therapy Treatment    Patient Name:  Saba Lott   MRN:  [id number]    Recommendations:     Discharge Recommendations:  home health PT   Discharge Equipment Recommendations: bedside commode   Barriers to discharge: None    Assessment:     Saba Lott is a 54 y.o. male admitted with a medical diagnosis of LVAD (left ventricular assist device) present.  He presents with the following impairments/functional limitations:  impaired functional mobilty, gait instability, impaired endurance, decreased lower extremity function pt krista treatment well but session was cut short due to LVAD education. Pt will benefit from cont skilled PT 5x/wk to progress physically. Pt will be able to discharge home with HHPT when medically stable. Pt is s/p LVAD HM 3 placement 2/6, chest closed 2/8/20.    Rehab Prognosis: Good; patient would benefit from acute skilled PT services to address these deficits and reach maximum level of function.    Recent Surgery: Procedure(s) (LRB):  CLOSURE, WOUND, STERNUM (N/A)  WASHOUT  INSERTION, GRAFT, PERICARDIUM  APPLICATION, WOUND VAC 17 Days Post-Op    Plan:     During this hospitalization, patient to be seen 5 x/week to address the identified rehab impairments via gait training, therapeutic activities, therapeutic exercises and progress toward the following goals:    · Plan of Care Expires:  03/13/20    Subjective     Chief Complaint: pt had no complaints during treatment.   Patient/Family Comments/goals:  To get better and go home.   Pain/Comfort:  · Pain Rating 1: 0/10  · Pain Rating Post-Intervention 1: 0/10      Objective:     Communicated with nurse prior to session.  Patient found up in chair with PICC line, LVAD, telemetry(avasys camera) upon PT entry to room.     General Precautions: Standard, LVAD, fall, sternal   Orthopedic Precautions:N/A   Braces:       Functional Mobility:  · Transfers:     · Sit to Stand:  contact guard assistance with hand-held assist . Pt stood x 2 reps. Pt needed  verbal cues for correct consolidation bag usage. Pt had bad with see through flap facing inward and did not have waist strap attached.   ·   · Gait: pt received gait training ~ 14 ft x 2 reps with CGA. pt received gait training from bed to/from commode. Distance ambulated was short due to LVAD coordinators arriving for education.       AM-PAC 6 CLICK MOBILITY  Turning over in bed (including adjusting bedclothes, sheets and blankets)?: 3  Sitting down on and standing up from a chair with arms (e.g., wheelchair, bedside commode, etc.): 3  Moving from lying on back to sitting on the side of the bed?: 3  Moving to and from a bed to a chair (including a wheelchair)?: 3  Need to walk in hospital room?: 3  Climbing 3-5 steps with a railing?: 3  Basic Mobility Total Score: 18       Therapeutic Activities and Exercises:   pt received verbal instruction in role of PT and POC and verbally expressed understanding of such.     Patient left up in chair with all lines intact, call button in reach and mother and LVAD coordinators.  present..    GOALS:   Multidisciplinary Problems     Physical Therapy Goals        Problem: Physical Therapy Goal    Goal Priority Disciplines Outcome Goal Variances Interventions   Physical Therapy Goal     PT, PT/OT Ongoing, Progressing     Description:  Goals to be met by: 3/9/2020     Patient will increase functional independence with mobility by performin. Supine to sit with CGA- not met  2. Sit to stand transfer with Supervision- not met  3. Gait  x 150 feet with Supervision - not met                           Time Tracking:     PT Received On: 20  PT Start Time: 1331     PT Stop Time: 1339  PT Total Time (min): 8 min     Billable Minutes: Gait Training 8 min    Treatment Type: Treatment  PT/PTA: PT     PTA Visit Number: 0     Christa Malik, PT  2020

## 2020-02-24 NOTE — PT/OT/SLP PROGRESS
Occupational Therapy      Patient Name:  Saba Lott   MRN:  [id number]    Patient not seen today secondary to RN reported pt just ambulated and recieved pain medication with the preference to hold OT at the time. OT unable to make 2nd attempt in the PM. Discussed pt's functional performance with PT in the afternoon. Pt remains within POC and appropriate for skilled OT services. Will follow-up on next scheduled visit per OT POC.     Yarelis Castellano OT  2/24/2020

## 2020-02-24 NOTE — PROGRESS NOTES
02/24/2020  Yudith Mcmanus    Current provider:  Fernandez Gr Jr.,*      I, Yudith Mcmanus, rounded on Saba Lott to ensure all mechanical assist device settings (IABP or VAD) were appropriate and all parameters were within limits.  I was able to ensure all back up equipment was present, the staff had no issues, and the Perfusion Department daily rounding was complete.    8:56 AM

## 2020-02-24 NOTE — PLAN OF CARE
Plan of care discussed with patient and mother. Patient is free of fall/trauma/injury. Denies CP, SOB, or pain/discomfort. Telemetry monitor showing SR/ST with conduction defect. LVAD Kit dressing being taught to mother. No drainage noted-2 with sutures intact. Mc amaro intact. Mother is doing well but does needs more practice a couple of times before being checked off. LVAD numbers WNL. No alarms noted. Remains on dobutamine at 2.5mcg's/kg/min and lasix at 20mg/hr. All questions addressed. Encouraged incentive spirometry. Reminded pt of sternal precautions. Will do CVP and VBG with am labs

## 2020-02-24 NOTE — NURSING
30 minutes spent at bedside with pt and mother for VAD education. Pt able to recall education reviewed last week. Pt is improving with education and is more interactive with VAD Coordinator. Pt to complete his workbook by Wednesday. Hopeful to check pt off on Thursday.     Per mother, she is progressing well with the VAD dressing change and hopes to be checked off today/tomorrow.

## 2020-02-24 NOTE — SUBJECTIVE & OBJECTIVE
Interval History: No complaints or overnight events. Euvolemic by exam, so Lasix gtt transitioned to Bumex 1 mg bid to start tonight.    Continuous Infusions:   DOBUTamine 2.5 mcg/kg/min (02/24/20 0656)     Scheduled Meds:   amLODIPine  10 mg Oral Daily    aspirin  325 mg Per NG tube Daily    bumetanide  1 mg Oral BID    docusate sodium  100 mg Oral BID    hydrALAZINE  100 mg Oral Q8H    pantoprazole  40 mg Oral Daily    polyethylene glycol  17 g Oral Daily    potassium chloride  20 mEq Oral BID    sodium chloride 0.9%  10 mL Intravenous Q6H    spironolactone  25 mg Oral Daily    warfarin  10 mg Oral Daily     PRN Meds:sodium chloride, acetaminophen, albuterol sulfate, benzonatate, bisacodyL, Dextrose 10% Bolus, Dextrose 10% Bolus, Dextrose 10% Bolus, Dextrose 10% Bolus, Dextrose 10% Bolus, Dextrose 10% Bolus, diphenhydrAMINE, magnesium hydroxide 400 mg/5 ml, oxyCODONE, oxyCODONE, Flushing PICC Protocol **AND** sodium chloride 0.9% **AND** sodium chloride 0.9%    Review of patient's allergies indicates:   Allergen Reactions    Iodine and iodide containing products      Objective:     Vital Signs (Most Recent):  Temp: 97.9 °F (36.6 °C) (02/24/20 1130)  Pulse: (!) 111 (02/24/20 1207)  Resp: 16 (02/24/20 1130)  BP: (!) 86/0 (02/24/20 1130)  SpO2: 100 % (02/24/20 1245) Vital Signs (24h Range):  Temp:  [97.7 °F (36.5 °C)-98.9 °F (37.2 °C)] 97.9 °F (36.6 °C)  Pulse:  [101-113] 111  Resp:  [16] 16  SpO2:  [95 %-100 %] 100 %  BP: (84-98)/(0-72) 86/0     Patient Vitals for the past 72 hrs (Last 3 readings):   Weight   02/24/20 0706 76.8 kg (169 lb 5 oz)   02/23/20 0433 78.5 kg (173 lb 1 oz)     Body mass index is 26.52 kg/m².      Intake/Output Summary (Last 24 hours) at 2/24/2020 1318  Last data filed at 2/24/2020 1243  Gross per 24 hour   Intake 2356.61 ml   Output 3025 ml   Net -668.39 ml       Hemodynamic Parameters:  CVP:  [12 mmHg] 12 mmHg    Telemetry: ST    Physical Exam   Constitutional: He is oriented  to person, place, and time. He appears well-developed and well-nourished.   HENT:   Head: Normocephalic and atraumatic.   Eyes: Pupils are equal, round, and reactive to light. Conjunctivae and EOM are normal.   Neck: Normal range of motion. Neck supple. No JVD present. No thyromegaly present.   Cardiovascular: Regular rhythm.   Tachycardic. Smooth VAD hum   Pulmonary/Chest: Effort normal.   Breath sounds decreased left base with few crackles left base   Abdominal: Soft. Bowel sounds are normal.   Musculoskeletal: Normal range of motion. He exhibits no edema.   Neurological: He is alert and oriented to person, place, and time.   Skin: Skin is warm and dry. Capillary refill takes 2 to 3 seconds.   Psychiatric: He has a normal mood and affect. His behavior is normal. Judgment and thought content normal.       Significant Labs:  CBC:  Recent Labs   Lab 02/22/20 0402 02/23/20 0428 02/24/20 0415   WBC 18.57* 15.05* 15.72*   RBC 3.19* 3.16* 3.49*   HGB 8.1* 8.0* 8.9*   HCT 27.1* 27.9* 30.4*   * 847* 910*   MCV 85 88 87   MCH 25.4* 25.3* 25.5*   MCHC 29.9* 28.7* 29.3*     BNP:  Recent Labs   Lab 02/19/20  0411 02/21/20  0515 02/24/20  0415   * 825* 515*     CMP:  Recent Labs   Lab 02/19/20  0411 02/21/20  0515 02/22/20 0402 02/23/20 0428 02/24/20  0415      < > 132* 118* 123* 111*   CALCIUM 8.5*   < > 9.3 9.8 9.4 10.0   ALBUMIN 2.4*  --  2.6*  --   --  3.1*   PROT 7.4  --  8.2  --   --  9.0*   *   < > 134* 134* 135* 134*   K 4.2   < > 3.6 4.0 4.0 4.2   CO2 30*   < > 34* 36* 31* 33*   CL 92*   < > 90* 90* 94* 89*   BUN 5*   < > 23* 31* 31* 37*   CREATININE 1.1   < > 1.0 1.1 1.1 1.3   ALKPHOS 121  --  142*  --   --  163*   ALT 23  --  23  --   --  22   AST 26  --  28  --   --  27   BILITOT 0.5  --  0.6  --   --  0.7    < > = values in this interval not displayed.      Coagulation:   Recent Labs   Lab 02/22/20  0402 02/23/20  0428 02/24/20  0415   INR 2.0* 1.9* 1.9*   APTT 38.6* 38.8* 33.8*      LDH:  Recent Labs   Lab 02/22/20  0402 02/23/20  0428 02/24/20  0415   * 318* 335*     Microbiology:  Microbiology Results (last 7 days)     ** No results found for the last 168 hours. **          I have reviewed all pertinent labs within the past 24 hours.    Estimated Creatinine Clearance: 60 mL/min (based on SCr of 1.3 mg/dL).    Diagnostic Results:  I have reviewed all pertinent imaging results/findings within the past 24 hours.

## 2020-02-24 NOTE — ASSESSMENT & PLAN NOTE
-HeartMate 3 Implanted 02/06/2020 as DT. Chest closed 2/7.   -HTS Primary.  -Implanted by Dr. Joshi.  -Continue Coumadin, Goal INR 2.0-3.0.  -Antiplatelets  mg.  -LDH is stable overall today. Will continue to monitor daily.  -CVP 11, and he is euvolemic by exam. Transitioned Lasix gtt to Bumex 1 mg bid to start tonight  -BP: Doppler/MAP goal 60-90. Continue Norvasc 10 mg qd, increase Hydralazine to 100 mg every 8 hours and continue Aldactone 25 mg qd  -Speed set at 5300.  -Echo 2/19 at 5300 rpm: LVIDD 6.2, TAPSE 1.26, Mild MR, AV opens intermittently. The ventricular septum is at midline. Moderate anterior pericardial effusion reported. This echo was reviewed by Dr. Gr  -Not listed for OHTx.  -PT/OT/VAD education    Procedure: Device Interrogation Including analysis of device parameters.  Current Settings: Ventricular Assist Device.  Review of device function is stable/unstable stable.    TXP LVAD INTERROGATIONS 2/24/2020 2/24/2020 2/24/2020 2/23/2020 2/23/2020 2/23/2020 2/23/2020   Type - HeartMate3 HeartMate3 HeartMate3 HeartMate3 - -   Flow 4.9 4.7 4.7 4.7 4.8 4.7 4.9   Speed 5300 5300 5300 5300 5300 5300 5300   PI 3.5 3.4 3.1 3.2 3.1 3.1 3.1   Power (Centeno) 4.1 4.0 4.0 4.0 3.9 4.0 4.0   LSL - 4900 4900 4900 4900 - -   Pulsatility - Intermittent pulse - - - - -

## 2020-02-24 NOTE — PROGRESS NOTES
LVAD dressing change done with mother. Doing very well but needs a couple more times to be watched. Pamphlet given on LVAD dressing procedure. Also gave some tips for putting on sterile gloves. Mother very receptive to teaching.

## 2020-02-24 NOTE — PROGRESS NOTES
[facility]  Heart Transplant  Progress Note    Patient Name: Saba Lott  MRN: [id number]  Admission Date: 1/15/2020  Hospital Length of Stay: 40 days  Attending Physician: Fernandez Gr Jr.,*  Primary Care Provider: Primary Doctor No  Principal Problem:LVAD (left ventricular assist device) present    Subjective:     Interval History: No complaints or overnight events. Euvolemic by exam, so Lasix gtt transitioned to Bumex 1 mg bid to start tonight.    Continuous Infusions:   DOBUTamine 2.5 mcg/kg/min (02/24/20 0656)     Scheduled Meds:   amLODIPine  10 mg Oral Daily    aspirin  325 mg Per NG tube Daily    bumetanide  1 mg Oral BID    docusate sodium  100 mg Oral BID    hydrALAZINE  100 mg Oral Q8H    pantoprazole  40 mg Oral Daily    polyethylene glycol  17 g Oral Daily    potassium chloride  20 mEq Oral BID    sodium chloride 0.9%  10 mL Intravenous Q6H    spironolactone  25 mg Oral Daily    warfarin  10 mg Oral Daily     PRN Meds:sodium chloride, acetaminophen, albuterol sulfate, benzonatate, bisacodyL, Dextrose 10% Bolus, Dextrose 10% Bolus, Dextrose 10% Bolus, Dextrose 10% Bolus, Dextrose 10% Bolus, Dextrose 10% Bolus, diphenhydrAMINE, magnesium hydroxide 400 mg/5 ml, oxyCODONE, oxyCODONE, Flushing PICC Protocol **AND** sodium chloride 0.9% **AND** sodium chloride 0.9%    Review of patient's allergies indicates:   Allergen Reactions    Iodine and iodide containing products      Objective:     Vital Signs (Most Recent):  Temp: 97.9 °F (36.6 °C) (02/24/20 1130)  Pulse: (!) 111 (02/24/20 1207)  Resp: 16 (02/24/20 1130)  BP: (!) 86/0 (02/24/20 1130)  SpO2: 100 % (02/24/20 1245) Vital Signs (24h Range):  Temp:  [97.7 °F (36.5 °C)-98.9 °F (37.2 °C)] 97.9 °F (36.6 °C)  Pulse:  [101-113] 111  Resp:  [16] 16  SpO2:  [95 %-100 %] 100 %  BP: (84-98)/(0-72) 86/0     Patient Vitals for the past 72 hrs (Last 3 readings):   Weight   02/24/20 0706 76.8 kg (169 lb 5 oz)   02/23/20 0433 78.5  kg (173 lb 1 oz)     Body mass index is 26.52 kg/m².      Intake/Output Summary (Last 24 hours) at 2/24/2020 1318  Last data filed at 2/24/2020 1243  Gross per 24 hour   Intake 2356.61 ml   Output 3025 ml   Net -668.39 ml       Hemodynamic Parameters:  CVP:  [12 mmHg] 12 mmHg    Telemetry: ST    Physical Exam   Constitutional: He is oriented to person, place, and time. He appears well-developed and well-nourished.   HENT:   Head: Normocephalic and atraumatic.   Eyes: Pupils are equal, round, and reactive to light. Conjunctivae and EOM are normal.   Neck: Normal range of motion. Neck supple. No JVD present. No thyromegaly present.   Cardiovascular: Regular rhythm.   Tachycardic. Smooth VAD hum   Pulmonary/Chest: Effort normal.   Breath sounds decreased left base with few crackles left base   Abdominal: Soft. Bowel sounds are normal.   Musculoskeletal: Normal range of motion. He exhibits no edema.   Neurological: He is alert and oriented to person, place, and time.   Skin: Skin is warm and dry. Capillary refill takes 2 to 3 seconds.   Psychiatric: He has a normal mood and affect. His behavior is normal. Judgment and thought content normal.       Significant Labs:  CBC:  Recent Labs   Lab 02/22/20 0402 02/23/20 0428 02/24/20 0415   WBC 18.57* 15.05* 15.72*   RBC 3.19* 3.16* 3.49*   HGB 8.1* 8.0* 8.9*   HCT 27.1* 27.9* 30.4*   * 847* 910*   MCV 85 88 87   MCH 25.4* 25.3* 25.5*   MCHC 29.9* 28.7* 29.3*     BNP:  Recent Labs   Lab 02/19/20  0411 02/21/20  0515 02/24/20  0415   * 825* 515*     CMP:  Recent Labs   Lab 02/19/20  0411 02/21/20  0515 02/22/20  0402 02/23/20 0428 02/24/20 0415      < > 132* 118* 123* 111*   CALCIUM 8.5*   < > 9.3 9.8 9.4 10.0   ALBUMIN 2.4*  --  2.6*  --   --  3.1*   PROT 7.4  --  8.2  --   --  9.0*   *   < > 134* 134* 135* 134*   K 4.2   < > 3.6 4.0 4.0 4.2   CO2 30*   < > 34* 36* 31* 33*   CL 92*   < > 90* 90* 94* 89*   BUN 5*   < > 23* 31* 31* 37*    CREATININE 1.1   < > 1.0 1.1 1.1 1.3   ALKPHOS 121  --  142*  --   --  163*   ALT 23  --  23  --   --  22   AST 26  --  28  --   --  27   BILITOT 0.5  --  0.6  --   --  0.7    < > = values in this interval not displayed.      Coagulation:   Recent Labs   Lab 02/22/20  0402 02/23/20  0428 02/24/20  0415   INR 2.0* 1.9* 1.9*   APTT 38.6* 38.8* 33.8*     LDH:  Recent Labs   Lab 02/22/20  0402 02/23/20  0428 02/24/20  0415   * 318* 335*     Microbiology:  Microbiology Results (last 7 days)     ** No results found for the last 168 hours. **          I have reviewed all pertinent labs within the past 24 hours.    Estimated Creatinine Clearance: 60 mL/min (based on SCr of 1.3 mg/dL).    Diagnostic Results:  I have reviewed all pertinent imaging results/findings within the past 24 hours.    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 Chronic MARC - Class II-III and mild LE edema.      Hospital Course (synopsis of major diagnoses, care, treatment, and services provided during the course of the hospital stay):  -2/12 admit to CDU for diuresis with IV lasix  -IV abx   -CXR with suspected small left pleural effusion with associated left basilar atelectasis versus evolving airspace disease  -2/13 single chamber ICD implant; IV lasix decreased to BID  -2/16 add dobutamine, hold BB due to hypotension, no ACE-I or ARB due to recent HPI hypotension  -2/17 Lasix changed to PO  -2/18 dobutamine discontinued    Admitted to [facility] Thursday due to severe SOB for a week with associated orthopnea, MARC, and PND unable to lie flat and found to be in acute diastolic heart failure. States he normally weighs around 219 but up to 254lb on admission tonight. Says he hasn't been the most compliant in terms of fluid restriction and  daily weights but has avoided salt. BNP on admission was 2375. BUN/CRT 32/1.4 He was started on IV diuretics but continued to deteriorate. Creatinine continued to increase and reached 4.3 with oliguria. He was started on CRRT for a few days and tolerated well. Renal u/s was negative for obstruction and liver u/s without findings of cirrhosis. All of this was progression of cardiorenal syndrome with liver congestion from severe volume overload. On arrival vitals stable and in no acute distress. He has obvious anasarca up to the chest. He did have uop of 500 at time of arrival also.    TTE 5/28/19  · Moderate left ventricular enlargement.  · Severely decreased left ventricular systolic function. The estimated ejection fraction is 20%  · Global hypokinetic wall motion.  · Grade III (severe) left ventricular diastolic dysfunction consistent with restrictive physiology.  · Severe left atrial enlargement.  · Moderate right ventricular enlargement.  · Low normal right ventricular systolic function.  · Mild right atrial enlargement.  · Moderate mitral regurgitation.  Mild to moderate tricuspid regurgitation    * LVAD (left ventricular assist device) present  -HeartMate 3 Implanted 02/06/2020 as DT. Chest closed 2/7.   -HTS Primary.  -Implanted by Dr. Joshi.  -Continue Coumadin, Goal INR 2.0-3.0.  -Antiplatelets  mg.  -LDH is stable overall today. Will continue to monitor daily.  -CVP 11, and he is euvolemic by exam. Transitioned Lasix gtt to Bumex 1 mg bid to start tonight  -BP: Doppler/MAP goal 60-90. Continue Norvasc 10 mg qd, increase Hydralazine to 100 mg every 8 hours and continue Aldactone 25 mg qd  -Speed set at 5300.  -Echo 2/19 at 5300 rpm: LVIDD 6.2, TAPSE 1.26, Mild MR, AV opens intermittently. The ventricular septum is at midline. Moderate anterior pericardial effusion reported. This echo was reviewed by Dr. Gr  -Not listed for OHTx.  -PT/OT/VAD education    Procedure: Device Interrogation  Including analysis of device parameters.  Current Settings: Ventricular Assist Device.  Review of device function is stable/unstable stable.    TXP LVAD INTERROGATIONS 2/24/2020 2/24/2020 2/24/2020 2/23/2020 2/23/2020 2/23/2020 2/23/2020   Type - HeartMate3 HeartMate3 HeartMate3 HeartMate3 - -   Flow 4.9 4.7 4.7 4.7 4.8 4.7 4.9   Speed 5300 5300 5300 5300 5300 5300 5300   PI 3.5 3.4 3.1 3.2 3.1 3.1 3.1   Power (Centeno) 4.1 4.0 4.0 4.0 3.9 4.0 4.0   LSL - 4900 4900 4900 4900 - -   Pulsatility - Intermittent pulse - - - - -       Thrombocytosis  - Platelet count continues to climb at 847  - Appreciate Hem/Onc's help - thrombocytosis most likely reactive to current acute illness. Will check ESR and CRP    Anemia due to acute blood loss  - low iron stores, started IV iron 2/19  -S/P 1u prbc 2/19.     Leukocytosis  - Afebrile.   - WCC has normalized today at 10.57    Acute hyperglycemia  -HgA1C 6.6  -Endocrine following    Moderate malnutrition  - Boost added 1/27. Dr Joshi advised pt to not consume.   - Last Prealbumin 16.    Acute on chronic combined systolic and diastolic heart failure  - See LVAD    Morbid obesity  -Weight down considerably since admit with diuresis.  - Body mass index is 26.83 kg/m².      ANJELICA (acute kidney injury)  -Creatinine was 3.0 on admit and got as high as 4.3 at OSH prior to transfer. Renal function was normal 8 months ago.  -Cr has since normalized.     Cardiogenic shock  -NICM; Likely Etoh induced  -Transferred from [facility] with IABP at 1:1 for further level of care. IABP removed 1/25 and replaced 2/3.   -S/P HMIII on 2/3.     Deep vein thrombosis (DVT) of right lower extremity  -Unsure of timing but was on eliquis PTA.   -now on Coumadin post LVAD    AICD (automatic cardioverter/defibrillator) present  -Medtronic single chamber ICD placed 2019 for primary [place] [id number]  Heart Transplant  [facility]

## 2020-02-24 NOTE — PLAN OF CARE
Plan of care reviewed with pt, verbalized understanding  Answered questions  Free from falls and injury  Afebrile  ST on tele, LVAD cond  DBT 2.5mcg/kg  LVAD Hm3, hum noted     DRES changed by RN d/t the dressing falling off patient  Msi dressing changed  PT OT RT seen  Will continue to monitor

## 2020-02-24 NOTE — PROGRESS NOTES
DISCHARGE PLANNING    SAMRA rec'd TC from [facility] coordinator Yumiko ([phone]) advising that their physical therapist is out of office until Mon 3/2. SAMRA verified with team that Pts current d/c is scheduled for early week of 3/2. Returned call to Yumiko to advise that there are no concerns. No further needs at this time.

## 2020-02-25 LAB
ALLENS TEST: ABNORMAL
ANION GAP SERPL CALC-SCNC: 13 MMOL/L (ref 8–16)
APTT BLDCRRT: 36.3 SEC (ref 21–32)
BASOPHILS # BLD AUTO: 0.06 K/UL (ref 0–0.2)
BASOPHILS NFR BLD: 0.4 % (ref 0–1.9)
BUN SERPL-MCNC: 39 MG/DL (ref 6–20)
CALCIUM SERPL-MCNC: 9.9 MG/DL (ref 8.7–10.5)
CHLORIDE SERPL-SCNC: 91 MMOL/L (ref 95–110)
CO2 SERPL-SCNC: 28 MMOL/L (ref 23–29)
CREAT SERPL-MCNC: 1.3 MG/DL (ref 0.5–1.4)
DELSYS: ABNORMAL
DIFFERENTIAL METHOD: ABNORMAL
EOSINOPHIL # BLD AUTO: 0.3 K/UL (ref 0–0.5)
EOSINOPHIL NFR BLD: 2.2 % (ref 0–8)
ERYTHROCYTE [DISTWIDTH] IN BLOOD BY AUTOMATED COUNT: 25 % (ref 11.5–14.5)
EST. GFR  (AFRICAN AMERICAN): >60 ML/MIN/1.73 M^2
EST. GFR  (NON AFRICAN AMERICAN): >60 ML/MIN/1.73 M^2
GLUCOSE SERPL-MCNC: 129 MG/DL (ref 70–110)
HCO3 UR-SCNC: 32.1 MMOL/L (ref 24–28)
HCT VFR BLD AUTO: 29.5 % (ref 40–54)
HGB BLD-MCNC: 8.6 G/DL (ref 14–18)
IMM GRANULOCYTES # BLD AUTO: 0.22 K/UL (ref 0–0.04)
IMM GRANULOCYTES NFR BLD AUTO: 1.5 % (ref 0–0.5)
INR PPP: 2.2 (ref 0.8–1.2)
LDH SERPL L TO P-CCNC: 341 U/L (ref 110–260)
LYMPHOCYTES # BLD AUTO: 1.7 K/UL (ref 1–4.8)
LYMPHOCYTES NFR BLD: 11.6 % (ref 18–48)
MAGNESIUM SERPL-MCNC: 2.5 MG/DL (ref 1.6–2.6)
MCH RBC QN AUTO: 25.8 PG (ref 27–31)
MCHC RBC AUTO-ENTMCNC: 29.2 G/DL (ref 32–36)
MCV RBC AUTO: 89 FL (ref 82–98)
MODE: ABNORMAL
MONOCYTES # BLD AUTO: 1.1 K/UL (ref 0.3–1)
MONOCYTES NFR BLD: 7.1 % (ref 4–15)
NEUTROPHILS # BLD AUTO: 11.5 K/UL (ref 1.8–7.7)
NEUTROPHILS NFR BLD: 77.2 % (ref 38–73)
NRBC BLD-RTO: 1 /100 WBC
PCO2 BLDA: 41.7 MMHG (ref 35–45)
PH SMN: 7.5 [PH] (ref 7.35–7.45)
PHOSPHATE SERPL-MCNC: 5.3 MG/DL (ref 2.7–4.5)
PLATELET # BLD AUTO: 826 K/UL (ref 150–350)
PMV BLD AUTO: 8.7 FL (ref 9.2–12.9)
PO2 BLDA: 38 MMHG (ref 40–60)
POC BE: 9 MMOL/L
POC SATURATED O2: 77 % (ref 95–100)
POC TCO2: 33 MMOL/L (ref 24–29)
POTASSIUM SERPL-SCNC: 4.1 MMOL/L (ref 3.5–5.1)
PROTHROMBIN TIME: 21 SEC (ref 9–12.5)
RBC # BLD AUTO: 3.33 M/UL (ref 4.6–6.2)
SAMPLE: ABNORMAL
SITE: ABNORMAL
SODIUM SERPL-SCNC: 132 MMOL/L (ref 136–145)
WBC # BLD AUTO: 14.87 K/UL (ref 3.9–12.7)

## 2020-02-25 PROCEDURE — 27000248 HC VAD-ADDITIONAL DAY

## 2020-02-25 PROCEDURE — 25000003 PHARM REV CODE 250: Performed by: STUDENT IN AN ORGANIZED HEALTH CARE EDUCATION/TRAINING PROGRAM

## 2020-02-25 PROCEDURE — 93750 INTERROGATION VAD IN PERSON: CPT | Mod: ,,, | Performed by: INTERNAL MEDICINE

## 2020-02-25 PROCEDURE — 83615 LACTATE (LD) (LDH) ENZYME: CPT

## 2020-02-25 PROCEDURE — 27000646 HC AEROBIKA DEVICE

## 2020-02-25 PROCEDURE — 85610 PROTHROMBIN TIME: CPT

## 2020-02-25 PROCEDURE — 93750 PR INTERROGATE VENT ASSIST DEV, IN PERSON, W PHYSICIAN ANALYSIS: ICD-10-PCS | Mod: ,,, | Performed by: INTERNAL MEDICINE

## 2020-02-25 PROCEDURE — 80048 BASIC METABOLIC PNL TOTAL CA: CPT

## 2020-02-25 PROCEDURE — 94664 DEMO&/EVAL PT USE INHALER: CPT

## 2020-02-25 PROCEDURE — 83735 ASSAY OF MAGNESIUM: CPT

## 2020-02-25 PROCEDURE — A4216 STERILE WATER/SALINE, 10 ML: HCPCS | Performed by: STUDENT IN AN ORGANIZED HEALTH CARE EDUCATION/TRAINING PROGRAM

## 2020-02-25 PROCEDURE — 20600001 HC STEP DOWN PRIVATE ROOM

## 2020-02-25 PROCEDURE — 99900035 HC TECH TIME PER 15 MIN (STAT)

## 2020-02-25 PROCEDURE — 85730 THROMBOPLASTIN TIME PARTIAL: CPT

## 2020-02-25 PROCEDURE — 85025 COMPLETE CBC W/AUTO DIFF WBC: CPT

## 2020-02-25 PROCEDURE — 27000685 HC LVAD KIT (30 DAY SUPPLY)

## 2020-02-25 PROCEDURE — 99232 PR SUBSEQUENT HOSPITAL CARE,LEVL II: ICD-10-PCS | Mod: ,,, | Performed by: INTERNAL MEDICINE

## 2020-02-25 PROCEDURE — 99232 SBSQ HOSP IP/OBS MODERATE 35: CPT | Mod: ,,, | Performed by: INTERNAL MEDICINE

## 2020-02-25 PROCEDURE — 25000003 PHARM REV CODE 250: Performed by: NURSE PRACTITIONER

## 2020-02-25 PROCEDURE — 84100 ASSAY OF PHOSPHORUS: CPT

## 2020-02-25 RX ADMIN — HYDRALAZINE HYDROCHLORIDE 100 MG: 25 TABLET ORAL at 05:02

## 2020-02-25 RX ADMIN — PANTOPRAZOLE SODIUM 40 MG: 40 TABLET, DELAYED RELEASE ORAL at 08:02

## 2020-02-25 RX ADMIN — BUMETANIDE 1 MG: 1 TABLET ORAL at 09:02

## 2020-02-25 RX ADMIN — HYDRALAZINE HYDROCHLORIDE 100 MG: 25 TABLET ORAL at 09:02

## 2020-02-25 RX ADMIN — POTASSIUM CHLORIDE 20 MEQ: 1500 TABLET, EXTENDED RELEASE ORAL at 09:02

## 2020-02-25 RX ADMIN — POTASSIUM CHLORIDE 20 MEQ: 1500 TABLET, EXTENDED RELEASE ORAL at 08:02

## 2020-02-25 RX ADMIN — OXYCODONE HYDROCHLORIDE 5 MG: 5 TABLET ORAL at 08:02

## 2020-02-25 RX ADMIN — ASPIRIN 325 MG ORAL TABLET 325 MG: 325 PILL ORAL at 08:02

## 2020-02-25 RX ADMIN — DOCUSATE SODIUM 100 MG: 100 CAPSULE, LIQUID FILLED ORAL at 08:02

## 2020-02-25 RX ADMIN — HYDRALAZINE HYDROCHLORIDE 100 MG: 25 TABLET ORAL at 02:02

## 2020-02-25 RX ADMIN — BUMETANIDE 1 MG: 1 TABLET ORAL at 08:02

## 2020-02-25 RX ADMIN — WARFARIN SODIUM 10 MG: 5 TABLET ORAL at 04:02

## 2020-02-25 RX ADMIN — AMLODIPINE BESYLATE 10 MG: 10 TABLET ORAL at 08:02

## 2020-02-25 RX ADMIN — Medication 10 ML: at 05:02

## 2020-02-25 RX ADMIN — DOCUSATE SODIUM 100 MG: 100 CAPSULE, LIQUID FILLED ORAL at 09:02

## 2020-02-25 RX ADMIN — SPIRONOLACTONE 25 MG: 25 TABLET ORAL at 08:02

## 2020-02-25 RX ADMIN — Medication 10 ML: at 06:02

## 2020-02-25 NOTE — PLAN OF CARE
No significant events throughout shift.  continued at MD orderd rate, tolerating well. Diuresing with Bumex, see EPIC for UOP. VSS. O2 sats remain stable on Ra. Afebrile. Sternal precautions, IS use, and fluid restriction reinforced. Free of falls/trauma/injury or skin breakdown. Patient's mother is at bedside. Plan of care reviewed with patient. Patient verbalizes understanding of plan. NADN. Will continue to monitor.

## 2020-02-25 NOTE — NURSING
LVAD dressing change completed using sterile technique with daily kit by RN d/t dressing falling off.  DLES is a 2 with no drainage noted on the drain sponge, slight pink in color. Tolerated without any complication. Sutures remain intact, no tenderness noted. Patients caregiver IS NOT checked off on dressing change. She will be here tonight to change dressing.

## 2020-02-25 NOTE — SUBJECTIVE & OBJECTIVE
Interval History: No complaints or overnight events    Continuous Infusions:   DOBUTamine 2.5 mcg/kg/min (02/24/20 0656)     Scheduled Meds:   amLODIPine  10 mg Oral Daily    aspirin  325 mg Per NG tube Daily    bumetanide  1 mg Oral BID    docusate sodium  100 mg Oral BID    hydrALAZINE  100 mg Oral Q8H    pantoprazole  40 mg Oral Daily    polyethylene glycol  17 g Oral Daily    potassium chloride  20 mEq Oral BID    sodium chloride 0.9%  10 mL Intravenous Q6H    spironolactone  25 mg Oral Daily    warfarin  10 mg Oral Daily     PRN Meds:sodium chloride, acetaminophen, albuterol sulfate, benzonatate, bisacodyL, Dextrose 10% Bolus, Dextrose 10% Bolus, Dextrose 10% Bolus, Dextrose 10% Bolus, Dextrose 10% Bolus, Dextrose 10% Bolus, diphenhydrAMINE, magnesium hydroxide 400 mg/5 ml, oxyCODONE, oxyCODONE, Flushing PICC Protocol **AND** sodium chloride 0.9% **AND** sodium chloride 0.9%    Review of patient's allergies indicates:   Allergen Reactions    Iodine and iodide containing products      Objective:     Vital Signs (Most Recent):  Temp: 98.6 °F (37 °C) (02/25/20 0500)  Pulse: 106 (02/25/20 0717)  Resp: 18 (02/25/20 0500)  BP: (!) 86/0 (02/25/20 0500)  SpO2: 98 % (02/25/20 0500) Vital Signs (24h Range):  Temp:  [97.9 °F (36.6 °C)-98.6 °F (37 °C)] 98.6 °F (37 °C)  Pulse:  [106-119] 106  Resp:  [16-18] 18  SpO2:  [95 %-100 %] 98 %  BP: (84-88)/(0) 86/0     Patient Vitals for the past 72 hrs (Last 3 readings):   Weight   02/25/20 0500 77.1 kg (169 lb 15.6 oz)   02/24/20 0706 76.8 kg (169 lb 5 oz)   02/23/20 0433 78.5 kg (173 lb 1 oz)     Body mass index is 26.62 kg/m².      Intake/Output Summary (Last 24 hours) at 2/25/2020 0748  Last data filed at 2/25/2020 0543  Gross per 24 hour   Intake 1597.33 ml   Output 1775 ml   Net -177.67 ml       Hemodynamic Parameters:       Telemetry: ST    Physical Exam   Constitutional: He is oriented to person, place, and time. He appears well-developed and well-nourished.    HENT:   Head: Normocephalic and atraumatic.   Eyes: Pupils are equal, round, and reactive to light. Conjunctivae and EOM are normal.   Neck: Normal range of motion. Neck supple. No JVD present. No thyromegaly present.   Cardiovascular: Regular rhythm.   Tachycardic. Smooth VAD hum   Pulmonary/Chest: Effort normal.   Breath sounds decreased left base with few crackles left base   Abdominal: Soft. Bowel sounds are normal.   Musculoskeletal: Normal range of motion. He exhibits no edema.   Neurological: He is alert and oriented to person, place, and time.   Skin: Skin is warm and dry. Capillary refill takes 2 to 3 seconds.   Psychiatric: He has a normal mood and affect. His behavior is normal. Judgment and thought content normal.       Significant Labs:  CBC:  Recent Labs   Lab 02/23/20 0428 02/24/20 0415 02/25/20  0511   WBC 15.05* 15.72* 14.87*   RBC 3.16* 3.49* 3.33*   HGB 8.0* 8.9* 8.6*   HCT 27.9* 30.4* 29.5*   * 910* 826*   MCV 88 87 89   MCH 25.3* 25.5* 25.8*   MCHC 28.7* 29.3* 29.2*     BNP:  Recent Labs   Lab 02/19/20 0411 02/21/20  0515 02/24/20 0415   * 825* 515*     CMP:  Recent Labs   Lab 02/19/20 0411 02/21/20 0515  02/23/20 0428 02/24/20 0415 02/25/20  0511      < > 132*   < > 123* 111* 129*   CALCIUM 8.5*   < > 9.3   < > 9.4 10.0 9.9   ALBUMIN 2.4*  --  2.6*  --   --  3.1*  --    PROT 7.4  --  8.2  --   --  9.0*  --    *   < > 134*   < > 135* 134* 132*   K 4.2   < > 3.6   < > 4.0 4.2 4.1   CO2 30*   < > 34*   < > 31* 33* 28   CL 92*   < > 90*   < > 94* 89* 91*   BUN 5*   < > 23*   < > 31* 37* 39*   CREATININE 1.1   < > 1.0   < > 1.1 1.3 1.3   ALKPHOS 121  --  142*  --   --  163*  --    ALT 23  --  23  --   --  22  --    AST 26  --  28  --   --  27  --    BILITOT 0.5  --  0.6  --   --  0.7  --     < > = values in this interval not displayed.      Coagulation:   Recent Labs   Lab 02/23/20  0428 02/24/20  0415 02/25/20  0511   INR 1.9* 1.9* 2.2*   APTT 38.8* 33.8* 36.3*      LDH:  Recent Labs   Lab 02/23/20  0428 02/24/20  0415 02/25/20  0511   * 335* 341*     Microbiology:  Microbiology Results (last 7 days)     ** No results found for the last 168 hours. **          I have reviewed all pertinent labs within the past 24 hours.    Estimated Creatinine Clearance: 60 mL/min (based on SCr of 1.3 mg/dL).    Diagnostic Results:  I have reviewed all pertinent imaging results/findings within the past 24 hours.

## 2020-02-25 NOTE — PLAN OF CARE
[facility]   Heart Transplant/VAD Clinic   [address] [place] [address]   [phone] after hours          [phone] fax     VAD HOME  HEALTH ORDERS      Admit to Home Health    Diagnosis:   Patient Active Problem List   Diagnosis    Leg pain    Systolic dysfunction with acute on chronic heart failure    Non-ischemic cardiomyopathy    Congestive heart failure    AICD (automatic cardioverter/defibrillator) present    Deep vein thrombosis (DVT) of right lower extremity    Cardiogenic shock    History of alcohol abuse    History of tobacco abuse    ANJELICA (acute kidney injury)    Morbid obesity    Acute on chronic combined systolic and diastolic heart failure    Moderate malnutrition    Acute hyperglycemia    Goals of care, counseling/discussion    Advance care planning    Heart transplant candidate    LVAD (left ventricular assist device) present    Leukocytosis    Anemia due to acute blood loss    Presence of left ventricular assist device (LVAD)    Thrombocytosis    Central venous catheter in place    Anticoagulation monitoring, INR range 2-3       Patient is homebound due to:  NYHA Class IV HF. S/P LVAD placement.     Diet: Low Fat, Low cholesterol, 2Gm Na, Coumadin restrictions. 1500 cc fluid restriction    Acitivities: No Swimming, bathing, vacuuming, contact sports.    Fresh implants= Sternal Precautions    Nursing:   SN to complete comprehensive assessment including routine vital signs. Instruct on disease process and s/s of complications to report to MD. Review/verify medication list sent home with the patient at time of discharge  and instruct patient/caregiver as needed. Frequency may be adjusted depending on start of care date.    **LVAD driveline exit site dressing change is to be completed per LVAD patient/caregiver only**.    Notify MD if:  SBP > 120 or < 80;   MAP > 80 or < 65;   HR > 120 or < 60;   Temp > 101;   Weight  gain >3lbs in 1 day or 5lbs in 1 week.    LABS:  SN to perform labs: PT/INR per Coumadin clinic [phone].   Follow up INR date: 3/2/2020  No Finger Sticks    Weekly CBC, CMP, Mg+ and BNP starting 3/2/2020    HOME INFUSION THERAPY:    SN to perform Infusion Therapy/Central Line Care.  Review Central Line Care & Central Line Flush with patient.    CONSULTS:      Physical Therapy to evaluate and treat. Evaluate for home safety and equipment needs; Establish/upgrade home exercise program. Perform / instruct on therapeutic exercises, gait training, transfer training, and Range of Motion.    Occupational Therapy to evaluate and treat. Evaluate home environment for safety and equipment needs. Perform/Instruct on transfers, ADL training, ROM, and therapeutic exercises.    Send initial Home Health orders to [facility] attending physician on call.  Send follow up questions to VAD clinic MD [phone] or [phone].

## 2020-02-25 NOTE — ASSESSMENT & PLAN NOTE
-HeartMate 3 Implanted 02/06/2020 as DT. Chest closed 2/7.   -HTS Primary.  -Implanted by Dr. Joshi.  -Continue Coumadin, Goal INR 2.0-3.0.  -Antiplatelets  mg.  -LDH is stable overall today. Will continue to monitor daily.  -Remains on  at 2.5 mcg/kg/min with sVO2 77. Will D/C  and get echo tomorrow  -CVP 12, and he is euvolemic by exam. Continue Bumex 1 mg bid  -BP: Doppler/MAP goal 60-90. Continue Norvasc 10 mg qd,  Hydralazine 100 mg every 8 hours and continue Aldactone 25 mg qd  -Speed set at 5300.  -Echo 2/19 at 5300 rpm: LVIDD 6.2, TAPSE 1.26, Mild MR, AV opens intermittently. The ventricular septum is at midline. Moderate anterior pericardial effusion reported. This echo was reviewed by Dr. Gr  -Not listed for OHTx.  -PT/OT/VAD education    Procedure: Device Interrogation Including analysis of device parameters.  Current Settings: Ventricular Assist Device.  Review of device function is stable/unstable stable.    TXP LVAD INTERROGATIONS 2/24/2020 2/24/2020 2/24/2020 2/24/2020 2/23/2020 2/23/2020 2/23/2020   Type - - HeartMate3 HeartMate3 HeartMate3 HeartMate3 -   Flow 4.8 4.9 4.7 4.7 4.7 4.8 4.7   Speed 5300 5300 5300 5300 5300 5300 5300   PI 3.2 3.5 3.4 3.1 3.2 3.1 3.1   Power (Centeno) 4.0 4.1 4.0 4.0 4.0 3.9 4.0   LSL - - 4900 4900 4900 4900 -   Pulsatility - - Intermittent pulse - - - -

## 2020-02-25 NOTE — PROGRESS NOTES
I have seen the patient, reviewed the nurse practitioner's assessment and plan. I have personally interviewed and examined the patient at bedside and: agree with the findings.   Interrogation of Ventricular assist device was performed with physician analysis of device parameters and review of device function. I have personally reviewed the interrogation findings and agree with findings as stated.       [facility]  Heart Transplant  Progress Note    Patient Name: Saba Lott  MRN: [id number]  Admission Date: 1/15/2020  Hospital Length of Stay: 41 days  Attending Physician: Fernandez rG Jr.,*  Primary Care Provider: Primary Doctor No  Principal Problem:LVAD (left ventricular assist device) present    Subjective:     Interval History: No complaints or overnight events    Continuous Infusions:   DOBUTamine 2.5 mcg/kg/min (02/24/20 0656)     Scheduled Meds:   amLODIPine  10 mg Oral Daily    aspirin  325 mg Per NG tube Daily    bumetanide  1 mg Oral BID    docusate sodium  100 mg Oral BID    hydrALAZINE  100 mg Oral Q8H    pantoprazole  40 mg Oral Daily    polyethylene glycol  17 g Oral Daily    potassium chloride  20 mEq Oral BID    sodium chloride 0.9%  10 mL Intravenous Q6H    spironolactone  25 mg Oral Daily    warfarin  10 mg Oral Daily     PRN Meds:sodium chloride, acetaminophen, albuterol sulfate, benzonatate, bisacodyL, Dextrose 10% Bolus, Dextrose 10% Bolus, Dextrose 10% Bolus, Dextrose 10% Bolus, Dextrose 10% Bolus, Dextrose 10% Bolus, diphenhydrAMINE, magnesium hydroxide 400 mg/5 ml, oxyCODONE, oxyCODONE, Flushing PICC Protocol **AND** sodium chloride 0.9% **AND** sodium chloride 0.9%    Review of patient's allergies indicates:   Allergen Reactions    Iodine and iodide containing products      Objective:     Vital Signs (Most Recent):  Temp: 98.6 °F (37 °C) (02/25/20 0500)  Pulse: 106 (02/25/20 0717)  Resp: 18 (02/25/20 0500)  BP: (!) 86/0 (02/25/20 0500)  SpO2: 98 %  (02/25/20 0500) Vital Signs (24h Range):  Temp:  [97.9 °F (36.6 °C)-98.6 °F (37 °C)] 98.6 °F (37 °C)  Pulse:  [106-119] 106  Resp:  [16-18] 18  SpO2:  [95 %-100 %] 98 %  BP: (84-88)/(0) 86/0     Patient Vitals for the past 72 hrs (Last 3 readings):   Weight   02/25/20 0500 77.1 kg (169 lb 15.6 oz)   02/24/20 0706 76.8 kg (169 lb 5 oz)   02/23/20 0433 78.5 kg (173 lb 1 oz)     Body mass index is 26.62 kg/m².      Intake/Output Summary (Last 24 hours) at 2/25/2020 0748  Last data filed at 2/25/2020 0543  Gross per 24 hour   Intake 1597.33 ml   Output 1775 ml   Net -177.67 ml       Hemodynamic Parameters:       Telemetry: ST    Physical Exam   Constitutional: He is oriented to person, place, and time. He appears well-developed and well-nourished.   HENT:   Head: Normocephalic and atraumatic.   Eyes: Pupils are equal, round, and reactive to light. Conjunctivae and EOM are normal.   Neck: Normal range of motion. Neck supple. No JVD present. No thyromegaly present.   Cardiovascular: Regular rhythm.   Tachycardic. Smooth VAD hum   Pulmonary/Chest: Effort normal.   Breath sounds decreased left base with few crackles left base   Abdominal: Soft. Bowel sounds are normal.   Musculoskeletal: Normal range of motion. He exhibits no edema.   Neurological: He is alert and oriented to person, place, and time.   Skin: Skin is warm and dry. Capillary refill takes 2 to 3 seconds.   Psychiatric: He has a normal mood and affect. His behavior is normal. Judgment and thought content normal.       Significant Labs:  CBC:  Recent Labs   Lab 02/23/20  0428 02/24/20  0415 02/25/20  0511   WBC 15.05* 15.72* 14.87*   RBC 3.16* 3.49* 3.33*   HGB 8.0* 8.9* 8.6*   HCT 27.9* 30.4* 29.5*   * 910* 826*   MCV 88 87 89   MCH 25.3* 25.5* 25.8*   MCHC 28.7* 29.3* 29.2*     BNP:  Recent Labs   Lab 02/19/20 0411 02/21/20  0515 02/24/20 0415   * 825* 515*     CMP:  Recent Labs   Lab 02/19/20 0411 02/21/20  0515  02/23/20  0428  02/24/20 0415 02/25/20  0511      < > 132*   < > 123* 111* 129*   CALCIUM 8.5*   < > 9.3   < > 9.4 10.0 9.9   ALBUMIN 2.4*  --  2.6*  --   --  3.1*  --    PROT 7.4  --  8.2  --   --  9.0*  --    *   < > 134*   < > 135* 134* 132*   K 4.2   < > 3.6   < > 4.0 4.2 4.1   CO2 30*   < > 34*   < > 31* 33* 28   CL 92*   < > 90*   < > 94* 89* 91*   BUN 5*   < > 23*   < > 31* 37* 39*   CREATININE 1.1   < > 1.0   < > 1.1 1.3 1.3   ALKPHOS 121  --  142*  --   --  163*  --    ALT 23  --  23  --   --  22  --    AST 26  --  28  --   --  27  --    BILITOT 0.5  --  0.6  --   --  0.7  --     < > = values in this interval not displayed.      Coagulation:   Recent Labs   Lab 02/23/20 0428 02/24/20 0415 02/25/20  0511   INR 1.9* 1.9* 2.2*   APTT 38.8* 33.8* 36.3*     LDH:  Recent Labs   Lab 02/23/20 0428 02/24/20 0415 02/25/20  0511   * 335* 341*     Microbiology:  Microbiology Results (last 7 days)     ** No results found for the last 168 hours. **          I have reviewed all pertinent labs within the past 24 hours.    Estimated Creatinine Clearance: 60 mL/min (based on SCr of 1.3 mg/dL).    Diagnostic Results:  I have reviewed all pertinent imaging results/findings within the past 24 hours.    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 Chronic MARC - Class II-III and mild LE edema.      Hospital Course (synopsis of major diagnoses, care, treatment, and services provided during the course of the hospital stay):  -2/12 admit to CDU for diuresis with IV lasix  -IV abx   -CXR with suspected small left pleural effusion with associated left basilar atelectasis versus evolving airspace disease  -2/13 single chamber ICD implant; IV lasix decreased to BID  -2/16 add dobutamine, hold BB due to hypotension, no ACE-I or ARB due to  recent HPI hypotension  -2/17 Lasix changed to PO  -2/18 dobutamine discontinued    Admitted to [facility] on Thursday due to severe SOB for a week with associated orthopnea, MARC, and PND unable to lie flat and found to be in acute diastolic heart failure. States he normally weighs around 219 but up to 254lb on admission tonight. Says he hasn't been the most compliant in terms of fluid restriction and daily weights but has avoided salt. BNP on admission was 2375. BUN/CRT 32/1.4 He was started on IV diuretics but continued to deteriorate. Creatinine continued to increase and reached 4.3 with oliguria. He was started on CRRT for a few days and tolerated well. Renal u/s was negative for obstruction and liver u/s without findings of cirrhosis. All of this was progression of cardiorenal syndrome with liver congestion from severe volume overload. On arrival vitals stable and in no acute distress. He has obvious anasarca up to the chest. He did have uop of 500 at time of arrival also.    TTE 5/28/19  · Moderate left ventricular enlargement.  · Severely decreased left ventricular systolic function. The estimated ejection fraction is 20%  · Global hypokinetic wall motion.  · Grade III (severe) left ventricular diastolic dysfunction consistent with restrictive physiology.  · Severe left atrial enlargement.  · Moderate right ventricular enlargement.  · Low normal right ventricular systolic function.  · Mild right atrial enlargement.  · Moderate mitral regurgitation.  Mild to moderate tricuspid regurgitation    * LVAD (left ventricular assist device) present  -HeartMate 3 Implanted 02/06/2020 as DT. Chest closed 2/7.   -HTS Primary.  -Implanted by Dr. Joshi.  -Continue Coumadin, Goal INR 2.0-3.0.  -Antiplatelets  mg.  -LDH is stable overall today. Will continue to monitor daily.  -Remains on  at 2.5 mcg/kg/min with sVO2 77. Will D/C  and get echo tomorrow  -CVP 12, and he is euvolemic by exam. Continue  Bumex 1 mg bid  -BP: Doppler/MAP goal 60-90. Continue Norvasc 10 mg qd,  Hydralazine 100 mg every 8 hours and continue Aldactone 25 mg qd  -Speed set at 5300.  -Echo 2/19 at 5300 rpm: LVIDD 6.2, TAPSE 1.26, Mild MR, AV opens intermittently. The ventricular septum is at midline. Moderate anterior pericardial effusion reported. This echo was reviewed by Dr. Gr  -Not listed for OHTx.  -PT/OT/VAD education    Procedure: Device Interrogation Including analysis of device parameters.  Current Settings: Ventricular Assist Device.  Review of device function is stable/unstable stable.    TXP LVAD INTERROGATIONS 2/24/2020 2/24/2020 2/24/2020 2/24/2020 2/23/2020 2/23/2020 2/23/2020   Type - - HeartMate3 HeartMate3 HeartMate3 HeartMate3 -   Flow 4.8 4.9 4.7 4.7 4.7 4.8 4.7   Speed 5300 5300 5300 5300 5300 5300 5300   PI 3.2 3.5 3.4 3.1 3.2 3.1 3.1   Power (Centeno) 4.0 4.1 4.0 4.0 4.0 3.9 4.0   LSL - - 4900 4900 4900 4900 -   Pulsatility - - Intermittent pulse - - - -       Thrombocytosis  - Platelet count continues to climb at 847  - Appreciate Hem/Onc's help - thrombocytosis most likely reactive to current acute illness. Will check ESR and CRP    Anemia due to acute blood loss  - low iron stores, started IV iron 2/19  -S/P 1u prbc 2/19.     Leukocytosis  - Afebrile.   - WCC is trending down    Acute hyperglycemia  -HgA1C 6.6  -Endocrine following    Moderate malnutrition  - Boost added 1/27. Dr Joshi advised pt to not consume.   - Last Prealbumin 16.    Acute on chronic combined systolic and diastolic heart failure  - See LVAD    Morbid obesity  -Weight down considerably since admit with diuresis.  - Body mass index is 26.83 kg/m².      ANJELICA (acute kidney injury)  -Creatinine was 3.0 on admit and got as high as 4.3 at OSH prior to transfer. Renal function was normal 8 months ago.  -Cr has since normalized.     Cardiogenic shock  -NICM; Likely Etoh induced  -Transferred from [facility] with IABP at 1:1 for further  level of care. IABP removed 1/25 and replaced 2/3.   -S/P HMIII on 2/3.     Deep vein thrombosis (DVT) of right lower extremity  -Unsure of timing but was on eliquis PTA.   -now on Coumadin post LVAD    AICD (automatic cardioverter/defibrillator) present  -Medtronic single chamber ICD placed 2019 for primary [place] [id number]  Heart Transplant  [facility]

## 2020-02-25 NOTE — NURSING
LVAD dressing change completed using sterile technique with daily kit by family member (mother).  DLES is a 2 with no drainage noted on the drain sponge, slight pink in color. Tolerated without any complication. Sutures remain intact, no tenderness noted. Patients caregiver IS checked off on dressing change.

## 2020-02-25 NOTE — PLAN OF CARE
Plan of care reviewed with pt, verbalized understanding  Answered questions  Free from falls and injury  Afebrile  ST on tele, cond  LVAD Hm3, hum noted     DRES reinforced by RN d/t the dressing falling off patient  Msi dressing CDI  PT OT RT seen  Will continue to monitor

## 2020-02-26 DIAGNOSIS — Z95.811 HEART REPLACED BY HEART ASSIST DEVICE: Primary | ICD-10-CM

## 2020-02-26 DIAGNOSIS — E78.2 MIXED HYPERLIPIDEMIA: ICD-10-CM

## 2020-02-26 DIAGNOSIS — E05.90 HYPERTHYROIDISM: ICD-10-CM

## 2020-02-26 DIAGNOSIS — T46.2X5A AMIODARONE PULMONARY TOXICITY: ICD-10-CM

## 2020-02-26 DIAGNOSIS — J98.4 AMIODARONE PULMONARY TOXICITY: ICD-10-CM

## 2020-02-26 LAB
ALBUMIN SERPL BCP-MCNC: 3 G/DL (ref 3.5–5.2)
ALLENS TEST: ABNORMAL
ALP SERPL-CCNC: 143 U/L (ref 55–135)
ALT SERPL W/O P-5'-P-CCNC: 18 U/L (ref 10–44)
ANION GAP SERPL CALC-SCNC: 13 MMOL/L (ref 8–16)
APTT BLDCRRT: 36.1 SEC (ref 21–32)
ASCENDING AORTA: 3.38 CM
AST SERPL-CCNC: 26 U/L (ref 10–40)
BASOPHILS # BLD AUTO: 0.05 K/UL (ref 0–0.2)
BASOPHILS NFR BLD: 0.4 % (ref 0–1.9)
BILIRUB DIRECT SERPL-MCNC: 0.4 MG/DL (ref 0.1–0.3)
BILIRUB SERPL-MCNC: 0.5 MG/DL (ref 0.1–1)
BNP SERPL-MCNC: 772 PG/ML (ref 0–99)
BSA FOR ECHO PROCEDURE: 1.91 M2
BUN SERPL-MCNC: 35 MG/DL (ref 6–20)
CALCIUM SERPL-MCNC: 9.7 MG/DL (ref 8.7–10.5)
CHLORIDE SERPL-SCNC: 94 MMOL/L (ref 95–110)
CO2 SERPL-SCNC: 26 MMOL/L (ref 23–29)
CREAT SERPL-MCNC: 1.4 MG/DL (ref 0.5–1.4)
CRP SERPL-MCNC: 38.9 MG/L (ref 0–8.2)
CV ECHO LV RWT: 0.25 CM
DELSYS: ABNORMAL
DIFFERENTIAL METHOD: ABNORMAL
DOP CALC LVOT AREA: 4 CM2
DOP CALC LVOT DIAMETER: 2.26 CM
E WAVE DECELERATION TIME: 161.41 MSEC
E/A RATIO: 1.11
ECHO LV POSTERIOR WALL: 0.78 CM (ref 0.6–1.1)
EOSINOPHIL # BLD AUTO: 0.4 K/UL (ref 0–0.5)
EOSINOPHIL NFR BLD: 2.7 % (ref 0–8)
ERYTHROCYTE [DISTWIDTH] IN BLOOD BY AUTOMATED COUNT: 24.1 % (ref 11.5–14.5)
EST. GFR  (AFRICAN AMERICAN): >60 ML/MIN/1.73 M^2
EST. GFR  (NON AFRICAN AMERICAN): 56.2 ML/MIN/1.73 M^2
FIO2: 21
FRACTIONAL SHORTENING: 16 % (ref 28–44)
GLUCOSE SERPL-MCNC: 135 MG/DL (ref 70–110)
HCO3 UR-SCNC: 29.3 MMOL/L (ref 24–28)
HCT VFR BLD AUTO: 29.4 % (ref 40–54)
HGB BLD-MCNC: 8.6 G/DL (ref 14–18)
HR TR ECHO: 105 BPM
IMM GRANULOCYTES # BLD AUTO: 0.18 K/UL (ref 0–0.04)
IMM GRANULOCYTES NFR BLD AUTO: 1.3 % (ref 0–0.5)
INR PPP: 2.4 (ref 0.8–1.2)
INTERVENTRICULAR SEPTUM: 0.73 CM (ref 0.6–1.1)
LDH SERPL L TO P-CCNC: 330 U/L (ref 110–260)
LEFT ATRIUM SIZE: 3.72 CM
LEFT INTERNAL DIMENSION IN SYSTOLE: 5.32 CM (ref 2.1–4)
LEFT VENTRICLE DIASTOLIC VOLUME INDEX: 101.95 ML/M2
LEFT VENTRICLE DIASTOLIC VOLUME: 192.87 ML
LEFT VENTRICLE MASS INDEX: 100 G/M2
LEFT VENTRICLE SYSTOLIC VOLUME INDEX: 72.2 ML/M2
LEFT VENTRICLE SYSTOLIC VOLUME: 136.53 ML
LEFT VENTRICULAR INTERNAL DIMENSION IN DIASTOLE: 6.3 CM (ref 3.5–6)
LEFT VENTRICULAR MASS: 188.91 G
LV LATERAL E/E' RATIO: 21.6 M/S
LYMPHOCYTES # BLD AUTO: 2.2 K/UL (ref 1–4.8)
LYMPHOCYTES NFR BLD: 16 % (ref 18–48)
MAGNESIUM SERPL-MCNC: 2.3 MG/DL (ref 1.6–2.6)
MCH RBC QN AUTO: 26.2 PG (ref 27–31)
MCHC RBC AUTO-ENTMCNC: 29.3 G/DL (ref 32–36)
MCV RBC AUTO: 90 FL (ref 82–98)
MODE: ABNORMAL
MONOCYTES # BLD AUTO: 0.8 K/UL (ref 0.3–1)
MONOCYTES NFR BLD: 6.1 % (ref 4–15)
MV MEAN GRADIENT: 3 MMHG
MV PEAK A VEL: 0.97 M/S
MV PEAK E VEL: 1.08 M/S
NEUTROPHILS # BLD AUTO: 10 K/UL (ref 1.8–7.7)
NEUTROPHILS NFR BLD: 73.5 % (ref 38–73)
NRBC BLD-RTO: 0 /100 WBC
PCO2 BLDA: 40.3 MMHG (ref 35–45)
PH SMN: 7.47 [PH] (ref 7.35–7.45)
PHOSPHATE SERPL-MCNC: 4 MG/DL (ref 2.7–4.5)
PISA TR MAX VEL: 2 M/S
PLATELET # BLD AUTO: 802 K/UL (ref 150–350)
PMV BLD AUTO: 8.5 FL (ref 9.2–12.9)
PO2 BLDA: 41 MMHG (ref 40–60)
POC BE: 6 MMOL/L
POC SATURATED O2: 80 % (ref 95–100)
POC TCO2: 31 MMOL/L (ref 24–29)
POTASSIUM SERPL-SCNC: 4 MMOL/L (ref 3.5–5.1)
PROT SERPL-MCNC: 8.4 G/DL (ref 6–8.4)
PROTHROMBIN TIME: 22.5 SEC (ref 9–12.5)
RBC # BLD AUTO: 3.28 M/UL (ref 4.6–6.2)
SAMPLE: ABNORMAL
SINUS: 3.39 CM
SITE: ABNORMAL
SODIUM SERPL-SCNC: 133 MMOL/L (ref 136–145)
STJ: 3.1 CM
TDI LATERAL: 0.05 M/S
TR MAX PG: 16 MMHG
TRICUSPID ANNULAR PLANE SYSTOLIC EXCURSION: 1.32 CM
WBC # BLD AUTO: 13.53 K/UL (ref 3.9–12.7)

## 2020-02-26 PROCEDURE — 83735 ASSAY OF MAGNESIUM: CPT

## 2020-02-26 PROCEDURE — 25000003 PHARM REV CODE 250: Performed by: STUDENT IN AN ORGANIZED HEALTH CARE EDUCATION/TRAINING PROGRAM

## 2020-02-26 PROCEDURE — 86140 C-REACTIVE PROTEIN: CPT

## 2020-02-26 PROCEDURE — 80048 BASIC METABOLIC PNL TOTAL CA: CPT

## 2020-02-26 PROCEDURE — 97530 THERAPEUTIC ACTIVITIES: CPT

## 2020-02-26 PROCEDURE — 83880 ASSAY OF NATRIURETIC PEPTIDE: CPT

## 2020-02-26 PROCEDURE — 94664 DEMO&/EVAL PT USE INHALER: CPT

## 2020-02-26 PROCEDURE — 99900035 HC TECH TIME PER 15 MIN (STAT)

## 2020-02-26 PROCEDURE — 97535 SELF CARE MNGMENT TRAINING: CPT

## 2020-02-26 PROCEDURE — 93750 INTERROGATION VAD IN PERSON: CPT | Mod: ,,, | Performed by: INTERNAL MEDICINE

## 2020-02-26 PROCEDURE — 93750 PR INTERROGATE VENT ASSIST DEV, IN PERSON, W PHYSICIAN ANALYSIS: ICD-10-PCS | Mod: ,,, | Performed by: INTERNAL MEDICINE

## 2020-02-26 PROCEDURE — A4216 STERILE WATER/SALINE, 10 ML: HCPCS | Performed by: STUDENT IN AN ORGANIZED HEALTH CARE EDUCATION/TRAINING PROGRAM

## 2020-02-26 PROCEDURE — 25000003 PHARM REV CODE 250: Performed by: NURSE PRACTITIONER

## 2020-02-26 PROCEDURE — 85610 PROTHROMBIN TIME: CPT

## 2020-02-26 PROCEDURE — 85025 COMPLETE CBC W/AUTO DIFF WBC: CPT

## 2020-02-26 PROCEDURE — 80076 HEPATIC FUNCTION PANEL: CPT

## 2020-02-26 PROCEDURE — 84100 ASSAY OF PHOSPHORUS: CPT

## 2020-02-26 PROCEDURE — 97803 MED NUTRITION INDIV SUBSEQ: CPT

## 2020-02-26 PROCEDURE — 97116 GAIT TRAINING THERAPY: CPT

## 2020-02-26 PROCEDURE — 83615 LACTATE (LD) (LDH) ENZYME: CPT

## 2020-02-26 PROCEDURE — 99232 PR SUBSEQUENT HOSPITAL CARE,LEVL II: ICD-10-PCS | Mod: ,,, | Performed by: INTERNAL MEDICINE

## 2020-02-26 PROCEDURE — 20600001 HC STEP DOWN PRIVATE ROOM

## 2020-02-26 PROCEDURE — 85730 THROMBOPLASTIN TIME PARTIAL: CPT

## 2020-02-26 PROCEDURE — 36415 COLL VENOUS BLD VENIPUNCTURE: CPT

## 2020-02-26 PROCEDURE — 25000003 PHARM REV CODE 250: Performed by: INTERNAL MEDICINE

## 2020-02-26 PROCEDURE — 99232 SBSQ HOSP IP/OBS MODERATE 35: CPT | Mod: ,,, | Performed by: INTERNAL MEDICINE

## 2020-02-26 PROCEDURE — 27000248 HC VAD-ADDITIONAL DAY

## 2020-02-26 PROCEDURE — 82803 BLOOD GASES ANY COMBINATION: CPT

## 2020-02-26 RX ORDER — WARFARIN 7.5 MG/1
7.5 TABLET ORAL DAILY
Status: DISCONTINUED | OUTPATIENT
Start: 2020-02-26 | End: 2020-02-27 | Stop reason: HOSPADM

## 2020-02-26 RX ORDER — FERROUS SULFATE 325(65) MG
325 TABLET, DELAYED RELEASE (ENTERIC COATED) ORAL 2 TIMES DAILY
Status: DISCONTINUED | OUTPATIENT
Start: 2020-02-26 | End: 2020-02-27 | Stop reason: HOSPADM

## 2020-02-26 RX ADMIN — Medication 10 ML: at 06:02

## 2020-02-26 RX ADMIN — HYDRALAZINE HYDROCHLORIDE 100 MG: 25 TABLET ORAL at 08:02

## 2020-02-26 RX ADMIN — DOCUSATE SODIUM 100 MG: 100 CAPSULE, LIQUID FILLED ORAL at 08:02

## 2020-02-26 RX ADMIN — PANTOPRAZOLE SODIUM 40 MG: 40 TABLET, DELAYED RELEASE ORAL at 08:02

## 2020-02-26 RX ADMIN — BUMETANIDE 1 MG: 1 TABLET ORAL at 08:02

## 2020-02-26 RX ADMIN — POTASSIUM CHLORIDE 20 MEQ: 1500 TABLET, EXTENDED RELEASE ORAL at 08:02

## 2020-02-26 RX ADMIN — FERROUS SULFATE TAB EC 325 MG (65 MG FE EQUIVALENT) 325 MG: 325 (65 FE) TABLET DELAYED RESPONSE at 08:02

## 2020-02-26 RX ADMIN — HYDRALAZINE HYDROCHLORIDE 100 MG: 25 TABLET ORAL at 01:02

## 2020-02-26 RX ADMIN — SPIRONOLACTONE 25 MG: 25 TABLET ORAL at 08:02

## 2020-02-26 RX ADMIN — HYDRALAZINE HYDROCHLORIDE 100 MG: 25 TABLET ORAL at 06:02

## 2020-02-26 RX ADMIN — FERROUS SULFATE TAB EC 325 MG (65 MG FE EQUIVALENT) 325 MG: 325 (65 FE) TABLET DELAYED RESPONSE at 11:02

## 2020-02-26 RX ADMIN — ASPIRIN 325 MG ORAL TABLET 325 MG: 325 PILL ORAL at 08:02

## 2020-02-26 RX ADMIN — AMLODIPINE BESYLATE 10 MG: 10 TABLET ORAL at 08:02

## 2020-02-26 RX ADMIN — WARFARIN SODIUM 7.5 MG: 7.5 TABLET ORAL at 04:02

## 2020-02-26 NOTE — SUBJECTIVE & OBJECTIVE
Interval History:     Dobutamine stopped yesterday. Patient tolerating and doing well since stopping . Repeat echo performed today. HM3 at 5300 rpms, LVEDD 6.3, EF 10%, mild TR and MR, small pericardial effusion. CVP is 8 on PO diuretics. Creatinine stable at 1.4. Patient continues to work on VAD education.    Continuous Infusions:  Scheduled Meds:   amLODIPine  10 mg Oral Daily    aspirin  325 mg Per NG tube Daily    bumetanide  1 mg Oral BID    docusate sodium  100 mg Oral BID    ferrous sulfate  325 mg Oral BID    hydrALAZINE  100 mg Oral Q8H    pantoprazole  40 mg Oral Daily    polyethylene glycol  17 g Oral Daily    potassium chloride  20 mEq Oral BID    sodium chloride 0.9%  10 mL Intravenous Q6H    spironolactone  25 mg Oral Daily    warfarin  10 mg Oral Daily     PRN Meds:sodium chloride, acetaminophen, albuterol sulfate, benzonatate, bisacodyL, Dextrose 10% Bolus, Dextrose 10% Bolus, Dextrose 10% Bolus, Dextrose 10% Bolus, Dextrose 10% Bolus, Dextrose 10% Bolus, diphenhydrAMINE, magnesium hydroxide 400 mg/5 ml, oxyCODONE, oxyCODONE, Flushing PICC Protocol **AND** sodium chloride 0.9% **AND** sodium chloride 0.9%    Review of patient's allergies indicates:   Allergen Reactions    Iodine and iodide containing products      Objective:     Vital Signs (Most Recent):  Temp: 98.1 °F (36.7 °C) (02/26/20 0731)  Pulse: 98 (02/26/20 1000)  Resp: 16 (02/26/20 0731)  BP: (!) 80/0 (02/26/20 1000)  SpO2: (!) 94 % (02/26/20 0731) Vital Signs (24h Range):  Temp:  [97.9 °F (36.6 °C)-98.7 °F (37.1 °C)] 98.1 °F (36.7 °C)  Pulse:  [] 98  Resp:  [16-18] 16  SpO2:  [94 %-100 %] 94 %  BP: (78-97)/(0-63) 80/0     Patient Vitals for the past 72 hrs (Last 3 readings):   Weight   02/26/20 1000 77.6 kg (171 lb)   02/26/20 0630 77.9 kg (171 lb 11.8 oz)   02/25/20 0500 77.1 kg (169 lb 15.6 oz)     Body mass index is 26.78 kg/m².      Intake/Output Summary (Last 24 hours) at 2/26/2020 1055  Last data filed at  2/26/2020 0330  Gross per 24 hour   Intake 900 ml   Output 1075 ml   Net -175 ml       Hemodynamic Parameters:  CVP:  [8 mmHg] 8 mmHg    Telemetry: Reviewed - Afib noted    Physical Exam   Constitutional: He appears well-developed and well-nourished. No distress.   HENT:   Head: Normocephalic and atraumatic.   Eyes: EOM are normal.   Neck: Normal range of motion. JVD present.   JVD one finger above the clavicle at 90 degrees   Cardiovascular:   Smooth VAD hum   Pulmonary/Chest: Effort normal. No respiratory distress.   Abdominal: Soft. He exhibits no distension. There is no tenderness.   Musculoskeletal: He exhibits no edema.   Neurological: He is alert.   Skin: Skin is warm and dry. Capillary refill takes less than 2 seconds. He is not diaphoretic.   Psychiatric: He has a normal mood and affect.       Significant Labs:  CBC:  Recent Labs   Lab 02/24/20 0415 02/25/20  0511 02/26/20  0308   WBC 15.72* 14.87* 13.53*   RBC 3.49* 3.33* 3.28*   HGB 8.9* 8.6* 8.6*   HCT 30.4* 29.5* 29.4*   * 826* 802*   MCV 87 89 90   MCH 25.5* 25.8* 26.2*   MCHC 29.3* 29.2* 29.3*     BNP:  Recent Labs   Lab 02/21/20  0515 02/24/20  0415 02/26/20  0308   * 515* 772*     CMP:  Recent Labs   Lab 02/21/20 0515 02/24/20  0415 02/25/20  0511 02/26/20  0308   *   < > 111* 129* 135*   CALCIUM 9.3   < > 10.0 9.9 9.7   ALBUMIN 2.6*  --  3.1*  --  3.0*   PROT 8.2  --  9.0*  --  8.4   *   < > 134* 132* 133*   K 3.6   < > 4.2 4.1 4.0   CO2 34*   < > 33* 28 26   CL 90*   < > 89* 91* 94*   BUN 23*   < > 37* 39* 35*   CREATININE 1.0   < > 1.3 1.3 1.4   ALKPHOS 142*  --  163*  --  143*   ALT 23  --  22  --  18   AST 28  --  27  --  26   BILITOT 0.6  --  0.7  --  0.5    < > = values in this interval not displayed.      Coagulation:   Recent Labs   Lab 02/24/20 0415 02/25/20  0511 02/26/20  0308   INR 1.9* 2.2* 2.4*   APTT 33.8* 36.3* 36.1*     LDH:  Recent Labs   Lab 02/24/20 0415 02/25/20  0511 02/26/20  0308   *  341* 330*     Microbiology:  Microbiology Results (last 7 days)     ** No results found for the last 168 hours. **          I have reviewed all pertinent labs within the past 24 hours.    Estimated Creatinine Clearance: 55.7 mL/min (based on SCr of 1.4 mg/dL).    Diagnostic Results:  I have reviewed and interpreted all pertinent imaging results/findings within the past 24 hours.

## 2020-02-26 NOTE — PLAN OF CARE
Problem: Occupational Therapy Goal  Goal: Occupational Therapy Goal  Description  Goals to be met by: 2/24/20 Extended to 3/6/20    Patient will increase functional independence with ADLs by performing:    Feeding with [place].  UE Dressing with Supervision.  LE Dressing with Supervision.- Initiated; will need sock aid   Grooming while standing at sink with Supervision.- Initiated   Toileting from toilet with Supervision for hygiene and clothing management. - progressing  Toilet transfer to toilet with Supervision.- progressing   Pt will be (I) with VAD management during ADL. - progressing      Outcome: Ongoing, Progressing     Pt progressing well towards OT goals. OT POC remains appropriate for patient on this date. Pt will continue to benefit from skilled OT to address the deficits affecting his occupational performance.    Yarelis Castellano OTR/L  2/26/20

## 2020-02-26 NOTE — PT/OT/SLP PROGRESS
Occupational Therapy   Treatment    Name: Saba Lott  MRN: [id number]  Admitting Diagnosis:  LVAD (left ventricular assist device) present  19 Days Post-Op    Recommendations:     Discharge Recommendations: home health OT  Discharge Equipment Recommendations:  bedside commode  Barriers to discharge:  None    Assessment:     Saba Lott is a 54 y.o. male with a medical diagnosis of LVAD (left ventricular assist device) present.  He presents with the following performance deficits affecting function: weakness, impaired balance, gait instability, decreased safety awareness, impaired self care skills. Pt switched to battery power during session requiring set up A to have items within reach. Pt tolerated session well this date. Pt continues to display the deficits above that is affecting his occupational performance. Pt progressing well towards OT goals. OT POC remains appropriate for patient on this date. Pt will continue to benefit from skilled OT to address the deficits listed above.     Rehab Prognosis:  Good; patient would benefit from acute skilled OT services to address these deficits and reach maximum level of function.       Plan:     Patient to be seen 4 x/week to address the above listed problems via self-care/home management, therapeutic activities, therapeutic exercises  · Plan of Care Expires: 03/11/20  · Plan of Care Reviewed with: patient    Subjective     Pain/Comfort:  · Pain Rating 1: 0/10  · Pain Rating Post-Intervention 1: 0/10    Objective:     Communicated with: RN prior to session.  Patient found HOB elevated with telemetry, LVAD upon OT entry to room.    General Precautions: Standard, fall, LVAD, sternal   Orthopedic Precautions:N/A   Braces: N/A     Occupational Performance:     Bed Mobility:    · Patient completed Rolling/Turning to Left with  stand by assistance  · Patient completed Scooting/Bridging with stand by assistance  · Patient completed Supine to Sit with contact guard  assistance  · Patient completed Sit to Supine with contact guard assistance     Functional Mobility/Transfers:  · Patient completed Sit <> Stand Transfer from bed and commode with minimum assistance  with  no assistive device   · Patient completed Toilet Transfer Step Transfer technique with minimum assistance with  no AD  · Functional Mobility: Pt ambulated ~250 ft with CGA and no AD. No LOB noted. No RBs required.     Activities of Daily Living:  · Grooming: stand by assistance in standing for hand hygiene in standing at sink  · Toileting: contact guard assistance in standing for wiping and clothing management      [facility] 6 Click ADL: 19    Treatment & Education:  - Role of OT/ OT POC  - Self care safety/ independence  - Functional transfer/ mobility safety  - Bed mobility safety  - Importance of sitting UIC  - Better adherence to sternal precautions noted; 3/3 precautions alysa-iterated     · LVAD:  ? Pt completed self-test and recorded numbers in binders with min verbal cuing to check self test slot  ? Pt required set up A to switch from wall to battery power for all items to be within reach  ? Pt able to name the driveline, controller, battery, power module, and clips  ? Education provided on proper positioning of consolidation bag, contents of emergency bag, battery rotation, and importance of having controlled adhered to self with all mobility   ? Pt required minimal A with placement for batteries and controller into consolidation bag and min A for avoiding zipping cables and education on importance of maintaining integrity of cables   ? OT checked content of emergency bag   ? LVAD remaining on battery power at end of session       Patient left HOB elevated with all lines intact, call button in reach, RN notified and radiology presentEducation:      GOALS:   Multidisciplinary Problems     Occupational Therapy Goals        Problem: Occupational Therapy Goal    Goal Priority Disciplines Outcome Interventions    Occupational Therapy Goal     OT, PT/OT Ongoing, Progressing    Description:  Goals to be met by: 2/24/20 Extended to 3/6/20    Patient will increase functional independence with ADLs by performing:    Feeding with [place].  UE Dressing with Supervision.  LE Dressing with Supervision.- Initiated; will need sock aid   Grooming while standing at sink with Supervision.- Initiated   Toileting from toilet with Supervision for hygiene and clothing management. - progressing  Toilet transfer to toilet with Supervision.- progressing   Pt will be (I) with VAD management during ADL. - progressing                 Multidisciplinary Problems (Resolved)        Problem: Occupational Therapy Goal    Goal Priority Disciplines Outcome Interventions   Occupational Therapy Goal   (Resolved)     OT, PT/OT Met    Description:  Goals to be met by: 7 days 1/30/20     Patient will increase functional independence with ADLs by performing:    Pt to complete UE dressing with set-up-MET  Pt to complete LE dressing with SBA with AE-MET   Pt to complete toileting with supervision.--MET  Pt to complete standing g/h skills with supervision.--MET  Pt to complete t/f to commode, bed and chair with SBA--MET                         Time Tracking:     OT Date of Treatment: 02/26/20  OT Start Time: 0901  OT Stop Time: 0935  OT Total Time (min): 34 min    Billable Minutes:Self Care/Home Management 20  Therapeutic Activity 14    Yarelis Castellano, MISA  2/26/2020

## 2020-02-26 NOTE — NURSING
LVAD dressing change completed using sterile technique with daily kit by RN.  DLES is a 2 with no drainage noted on the drain sponge, slight pink in color. Tolerated without any complication. Sutures remain intact, no tenderness noted.

## 2020-02-26 NOTE — PROGRESS NOTES
02/26/2020  Abelardo Sepulveda    Current provider:  Fernandez Gr Jr.,*      I, Abelardo Sepulveda, rounded on Saba Lott to ensure all mechanical assist device settings (IABP or VAD) were appropriate and all parameters were within limits.  I was able to ensure all back up equipment was present, the staff had no issues, and the Perfusion Department daily rounding was complete.    1:19 PM

## 2020-02-26 NOTE — ASSESSMENT & PLAN NOTE
-Cr 3.0 on admit and was as high as 4.3 at OSH prior to transfer. Renal function was normal 8 months ago.  -Cr has since normalized.

## 2020-02-26 NOTE — PLAN OF CARE
Dobutamine discontinued; diuresing with Bumex, see EPIC for UOP. VSS. O2 sats remain stable on Ra. Afebrile. Sternal precautions, IS use, and fluid restriction reinforced. Free of falls/trauma/injury or skin breakdown. Plan of care reviewed with patient. Patient verbalizes understanding of plan. NADN. Will continue to monitor.

## 2020-02-26 NOTE — ASSESSMENT & PLAN NOTE
-S/P 1u pRBC 2/19  -low iron stores, started IV iron 2/19 and stopped on 2/22  -Started on PO iron

## 2020-02-26 NOTE — PROGRESS NOTES
[facility]  Heart Transplant  Progress Note    Patient Name: Saba Lott  MRN: [id number]  Admission Date: 1/15/2020  Hospital Length of Stay: 42 days  Attending Physician: Fernandez Gr Jr.,*  Primary Care Provider: Primary Doctor No  Principal Problem:LVAD (left ventricular assist device) present    Subjective:     Interval History:     Dobutamine stopped yesterday. Patient tolerating and doing well since stopping . Repeat echo performed today. HM3 at 5300 rpms, LVEDD 6.3, EF 10%, mild TR and MR, small pericardial effusion. CVP is 8 on PO diuretics. Creatinine stable at 1.4. Patient continues to work on VAD education.    Continuous Infusions:  Scheduled Meds:   amLODIPine  10 mg Oral Daily    aspirin  325 mg Per NG tube Daily    bumetanide  1 mg Oral BID    docusate sodium  100 mg Oral BID    ferrous sulfate  325 mg Oral BID    hydrALAZINE  100 mg Oral Q8H    pantoprazole  40 mg Oral Daily    polyethylene glycol  17 g Oral Daily    potassium chloride  20 mEq Oral BID    sodium chloride 0.9%  10 mL Intravenous Q6H    spironolactone  25 mg Oral Daily    warfarin  10 mg Oral Daily     PRN Meds:sodium chloride, acetaminophen, albuterol sulfate, benzonatate, bisacodyL, Dextrose 10% Bolus, Dextrose 10% Bolus, Dextrose 10% Bolus, Dextrose 10% Bolus, Dextrose 10% Bolus, Dextrose 10% Bolus, diphenhydrAMINE, magnesium hydroxide 400 mg/5 ml, oxyCODONE, oxyCODONE, Flushing PICC Protocol **AND** sodium chloride 0.9% **AND** sodium chloride 0.9%    Review of patient's allergies indicates:   Allergen Reactions    Iodine and iodide containing products      Objective:     Vital Signs (Most Recent):  Temp: 98.1 °F (36.7 °C) (02/26/20 0731)  Pulse: 98 (02/26/20 1000)  Resp: 16 (02/26/20 0731)  BP: (!) 80/0 (02/26/20 1000)  SpO2: (!) 94 % (02/26/20 0731) Vital Signs (24h Range):  Temp:  [97.9 °F (36.6 °C)-98.7 °F (37.1 °C)] 98.1 °F (36.7 °C)  Pulse:  [] 98  Resp:  [16-18] 16  SpO2:  [94  %-100 %] 94 %  BP: (78-97)/(0-63) 80/0     Patient Vitals for the past 72 hrs (Last 3 readings):   Weight   02/26/20 1000 77.6 kg (171 lb)   02/26/20 0630 77.9 kg (171 lb 11.8 oz)   02/25/20 0500 77.1 kg (169 lb 15.6 oz)     Body mass index is 26.78 kg/m².      Intake/Output Summary (Last 24 hours) at 2/26/2020 1057  Last data filed at 2/26/2020 0330  Gross per 24 hour   Intake 900 ml   Output 1075 ml   Net -175 ml       Hemodynamic Parameters:  CVP:  [8 mmHg] 8 mmHg    Telemetry: Reviewed - Afib noted    Physical Exam   Constitutional: He appears well-developed and well-nourished. No distress.   HENT:   Head: Normocephalic and atraumatic.   Eyes: EOM are normal.   Neck: Normal range of motion. JVD present.   JVD one finger above the clavicle at 90 degrees   Cardiovascular:   Smooth VAD hum   Pulmonary/Chest: Effort normal. No respiratory distress.   Abdominal: Soft. He exhibits no distension. There is no tenderness.   Musculoskeletal: He exhibits no edema.   Neurological: He is alert.   Skin: Skin is warm and dry. Capillary refill takes less than 2 seconds. He is not diaphoretic.   Psychiatric: He has a normal mood and affect.       Significant Labs:  CBC:  Recent Labs   Lab 02/24/20 0415 02/25/20  0511 02/26/20  0308   WBC 15.72* 14.87* 13.53*   RBC 3.49* 3.33* 3.28*   HGB 8.9* 8.6* 8.6*   HCT 30.4* 29.5* 29.4*   * 826* 802*   MCV 87 89 90   MCH 25.5* 25.8* 26.2*   MCHC 29.3* 29.2* 29.3*     BNP:  Recent Labs   Lab 02/21/20 0515 02/24/20 0415 02/26/20  0308   * 515* 772*     CMP:  Recent Labs   Lab 02/21/20  0515  02/24/20  0415 02/25/20  0511 02/26/20  0308   *   < > 111* 129* 135*   CALCIUM 9.3   < > 10.0 9.9 9.7   ALBUMIN 2.6*  --  3.1*  --  3.0*   PROT 8.2  --  9.0*  --  8.4   *   < > 134* 132* 133*   K 3.6   < > 4.2 4.1 4.0   CO2 34*   < > 33* 28 26   CL 90*   < > 89* 91* 94*   BUN 23*   < > 37* 39* 35*   CREATININE 1.0   < > 1.3 1.3 1.4   ALKPHOS 142*  --  163*  --  143*   ALT  23  --  22  --  18   AST 28  --  27  --  26   BILITOT 0.6  --  0.7  --  0.5    < > = values in this interval not displayed.      Coagulation:   Recent Labs   Lab 02/24/20 0415 02/25/20  0511 02/26/20  0308   INR 1.9* 2.2* 2.4*   APTT 33.8* 36.3* 36.1*     LDH:  Recent Labs   Lab 02/24/20 0415 02/25/20  0511 02/26/20  0308   * 341* 330*     Microbiology:  Microbiology Results (last 7 days)     ** No results found for the last 168 hours. **          I have reviewed all pertinent labs within the past 24 hours.    Estimated Creatinine Clearance: 55.7 mL/min (based on SCr of 1.4 mg/dL).    Diagnostic Results:  I have reviewed and interpreted all pertinent imaging results/findings within the past 24 hours.    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 Chronic MARC - Class II-III and mild LE edema.  Transferred from [facility] for further management of cardigogenic shock and possible advanced options.       * LVAD (left ventricular assist device) present  -HeartMate 3 Implanted 02/06/2020 as DT. Chest closed 2/7.   -HTS Primary.  -Implanted by Dr. Joshi.  -Continue Coumadin, Goal INR 2.0-3.0.  -Antiplatelets  mg.  -LDH is stable overall today. Will continue to monitor daily.  - stopped and repeat echo performed  -Repeat Echo 2/26 off  at 5300 rpms, EF 10%, LVEDD 6.3 (LVEDD on 2/19 was 6.2), mild TR and MR, Grade I LV diastolic dysfunction, mild RV enlargement, small pericardial effusion, ventricular septum midline  -CVP 8. Continue Bumex 1 mg bid  -BP: Doppler/MAP goal 60-90. Continue Norvasc 10 mg qd, Hydralazine 100 mg every 8 hours, and Aldactone 25 mg qd  -Not listed for OHTx.  -Continue PT/OT/VAD education     Procedure: Device Interrogation Including analysis of device parameters.  Current  Settings: Ventricular Assist Device.  Review of device function is stable/unstable stable.    TXP LVAD INTERROGATIONS 2/26/2020 2/25/2020 2/25/2020 2/25/2020 2/25/2020 2/25/2020 2/24/2020   Type HeartMate3 HeartMate3 HeartMate3 - - HeartMate3 -   Flow 4.6 4.7 4.7 7.8 4.9 4.7 4.8   Speed 5300 5300 5300 5300 5300 5300 5300   PI 3.4 3.2 3.4 3.3 2.9 3.1 3.2   Power (Centeno) 4.0 4.0 4.0 4.0 4.0 4.0 4.0   LSL 4900 4900 4900 - - 4900 -   Pulsatility - Intermittent pulse - - - Intermittent pulse -       Cardiogenic shock  -NICM; Likely Etoh induced  -Transferred from [facility] with IABP at 1:1 for further level of care. IABP removed 1/25 and replaced 2/3.   -S/P HM3 on 2/3.     ANJELICA (acute kidney injury)  -Cr 3.0 on admit and was as high as 4.3 at OSH prior to transfer. Renal function was normal 8 months ago.  -Cr has since normalized.     Acute on chronic combined systolic and diastolic heart failure  - See LVAD    Anemia due to acute blood loss  -S/P 1u pRBC 2/19  -low iron stores, started IV iron 2/19 and stopped on 2/22  -Started on PO iron    Leukocytosis  - Afebrile.   - WBC is trending down    Thrombocytosis  - Platelet count now down trending after peak at 910 on 2/24  - Appreciate Hem/Onc's help - thrombocytosis most likely reactive to current acute illness and iron deficiency  - PO iron replacement started    AICD (automatic cardioverter/defibrillator) present  -Medtronic single chamber ICD placed 2019 for primary prevention    Moderate malnutrition  - Boost added 1/27. Dr Joshi advised pt to not consume.   - Last Prealbumin 16.    Deep vein thrombosis (DVT) of right lower extremity  -Unsure of timing but was on eliquis PTA.   -now on Coumadin post LVAD    Acute hyperglycemia  -HgA1C 6.6  -Endocrine following    Morbid obesity  -Weight down considerably since admit with diuresis.  - Body mass index is 26.78 kg/m².        CONG Peterson  Heart Transplant  [facility]

## 2020-02-26 NOTE — PROGRESS NOTES
"[facility]  Adult Nutrition  Progress Note    SUMMARY       Recommendations  1. Encourage continued good PO intake as tolerated.   2. If PO intake decreases, recommend adding Optisource OS to all meals. RD to monitor.    Goals: Continued intake of % of EEN and EPN by RD follow up  Nutrition Goal Status: goal met  Communication of RD Recs: other (comment)(POC)    Reason for Assessment    Reason For Assessment: RD follow-up  Diagnosis: surgery/postoperative complications(s/p LVAD )  Relevant Medical History: CHF, NICM, HTN  Interdisciplinary Rounds: did not attend  General Information Comments: Pt resting in bed with no family at bedside this AM. He continues to report good appetite and intake of 100% of meals, confirmed per RN documentation. Wt loss since admission likely fluid related, as pt is -71L. NFPE updated today, pt continues with moderate malnutrition, improving with good intake.  Nutrition Discharge Planning: Adequate nutrition via PO intake.    Nutrition Risk Screen    Nutrition Risk Screen: no indicators present    Nutrition/Diet History    Patient Reported Diet/Restrictions/Preferences: general  Spiritual, Cultural Beliefs, Gnosticist Practices, Values that Affect Care: no  Factors Affecting Nutritional Intake: None identified at this time    Anthropometrics    Temp: 98.4 °F (36.9 °C)  Height Method: Estimated  Height: 5' 7" (170.2 cm)  Height (inches): 67 in  Weight Method: Standard Scale  Weight: 77.6 kg (171 lb)  Weight (lb): 171 lb  Ideal Body Weight (IBW), Male: 148 lb  % Ideal Body Weight, Male (lb): 115.54 %  BMI (Calculated): 26.8  BMI Grade: 35 - 39.9 - obesity - grade II  Usual Body Weight (UBW), k kg(admit weight)  % Usual Body Weight: 75.95  % Weight Change From Usual Weight: -24.21 %    Lab/Procedures/Meds    Pertinent Labs Reviewed: reviewed  Pertinent Labs Comments: Na 133, BUN 35, Alb 3, CRP 38.9  Pertinent Medications Reviewed: reviewed  Pertinent " Medications Comments: docusate, ferrous sulfate, pantoraprazole    Estimated/Assessed Needs    Weight Used For Calorie Calculations: 75 kg (165 lb 5.5 oz)  Energy Calorie Requirements (kcal): 1930 kcal/day  Energy Need Method: Tutor Key-St Jeor(x 1.25)  Protein Requirements:  g/day(1.2-1.4 g/kg)  Weight Used For Protein Calculations: 75 kg (165 lb 5.5 oz)  Fluid Requirements (mL): 1 mL/kcal or per MD  Estimated Fluid Requirement Method: RDA Method  RDA Method (mL): 1930    Nutrition Prescription Ordered    Current Diet Order: Regular  Nutrition Order Comments: 2000mL FR  Current Nutrition Support Formula Ordered: (-)  Current Nutrition Support Rate Ordered: 0 (ml)  Current Nutrition Support Frequency Ordered: -  Oral Nutrition Supplement: -    Evaluation of Received Nutrient/Fluid Intake    Enteral Calories (kcal): 0  Enteral Protein (gm): 0  Enteral (Free Water) Fluid (mL): 0  Other Calories (kcal): 0  % Kcal Needs: 0  % Protein Needs: 0  I/O: -71L since admit  Energy Calories Required: (-)  Protein Required: (-)  Fluid Required: (-)  Comments: LBM 2/24  Tolerance: tolerating  % Intake of Estimated Energy Needs: 75 - 100 %  % Meal Intake: 75 - 100 %    Nutrition Risk    Level of Risk/Frequency of Follow-up: low(1x/week)     Assessment and Plan    Moderate Protein-Calorie Malnutrition  Malnutrition in the context of Chronic Illness/Injury     Related to (etiology):  Lack of appetite in setting of chronic heart failure     Signs and Symptoms (as evidenced by):  Energy Intake: <75% of estimated energy requirement for > 3 months  Body Fat Depletion: mild and moderate depletion of orbitals and triceps   Muscle Mass Depletion: moderate depletion of interosseous muscle and lower extremities   Weight Loss: -24.2% x 1 month (mostly fluid related loss)   Fluid Accumulation: mild      Interventions(treatment strategy):  Collaboration of care with providers.     Nutrition Diagnosis Status:  Continues     Monitor and  Evaluation    Food and Nutrient Intake: energy intake, food and beverage intake  Food and Nutrient Adminstration: diet order  Knowledge/Beliefs/Attitudes: food and nutrition knowledge/skill  Physical Activity and Function: nutrition-related ADLs and IADLs  Anthropometric Measurements: weight, weight change  Biochemical Data, Medical Tests and Procedures: electrolyte and renal panel, gastrointestinal profile, inflammatory profile  Nutrition-Focused Physical Findings: overall appearance     Malnutrition Assessment  Malnutrition Type: chronic illness  Energy Intake: moderate energy intake      Energy Intake (Malnutrition): less than 75% for greater than or equal to 3 months   Orbital Region (Subcutaneous Fat Loss): mild depletion  Upper Arm Region (Subcutaneous Fat Loss): moderate depletion  Thoracic and Lumbar Region: well nourished   Yazidi Region (Muscle Loss): well nourished  Clavicle Bone Region (Muscle Loss): well nourished  Clavicle and Acromion Bone Region (Muscle Loss): well nourished  Dorsal Hand (Muscle Loss): moderate depletion  Patellar Region (Muscle Loss): moderate depletion  Anterior Thigh Region (Muscle Loss): moderate depletion  Posterior Calf Region (Muscle Loss): moderate depletion     Nutrition Follow-Up    RD Follow-up?: Yes

## 2020-02-26 NOTE — PLAN OF CARE
Plan of care discussed with patient.  Patient ambulating independently, fall precautions in place.Continuing to encourage sternal precautions, IS, and ambulation. LVAD DP and numbers WNL, smooth LVAD hum. Patient has no complaints of pain. Discussed medications and care. Mother checked off on dressing change. Still waiting for pt to be checked off on alarms, possibly early tomorrow. Echo completed today. Being diuresed with PO Bumex. Encouraged workbook, reviewed education, filled in binder. Patient has no questions at this time. Possible d/c tomorrow pending alarm check off and medical clearance. Will continue to monitor.

## 2020-02-26 NOTE — ASSESSMENT & PLAN NOTE
-NICM; Likely Etoh induced  -Transferred from [facility] with IABP at 1:1 for further level of care. IABP removed 1/25 and replaced 2/3.   -S/P HM3 on 2/3.

## 2020-02-26 NOTE — PT/OT/SLP PROGRESS
Physical Therapy Treatment    Patient Name:  Saba Lott   MRN:  [id number]    Recommendations:     Discharge Recommendations:  home health PT   Discharge Equipment Recommendations: bedside commode   Barriers to discharge: None    Assessment:     Saba Lott is a 54 y.o. male admitted with a medical diagnosis of LVAD (left ventricular assist device) present.  He presents with the following impairments/functional limitations:  impaired functional mobilty, gait instability, impaired endurance, impaired balance pt krista treatment better being able to gait train farther. Pt will benefit from cont skilled PT 5x/wk to progress physically. Pt will be able to discharge home with HHPT and BSC when medically stable. Pt is s/p LVAD HM 3 placement 2/6 with chest closure 2/8/20.    Rehab Prognosis: Good; patient would benefit from acute skilled PT services to address these deficits and reach maximum level of function.    Recent Surgery: Procedure(s) (LRB):  CLOSURE, WOUND, STERNUM (N/A)  WASHOUT  INSERTION, GRAFT, PERICARDIUM  APPLICATION, WOUND VAC 19 Days Post-Op    Plan:     During this hospitalization, patient to be seen 5 x/week to address the identified rehab impairments via gait training, therapeutic activities, therapeutic exercises and progress toward the following goals:    · Plan of Care Expires:  03/13/20    Subjective     Chief Complaint: pt had no complaints during treatment.   Patient/Family Comments/goals:  To get better and go home.   Pain/Comfort:  · Pain Rating 1: 0/10  · Pain Rating Post-Intervention 1: 0/10      Objective:     Communicated with nurse prior to session.  Patient found up in chair with telemetry, LVAD, PICC line upon PT entry to room.     General Precautions: Standard, LVAD, fall, sternal   Orthopedic Precautions:N/A   Braces:       Functional Mobility:  · Transfers:     · Sit to Stand:  contact guard assistance with hand-held assist  ·   · Gait: pt received gait training ~ 394 ft with SBA. pt had  emergency bag present during gait training.       AM-PAC 6 CLICK MOBILITY  Turning over in bed (including adjusting bedclothes, sheets and blankets)?: 4  Sitting down on and standing up from a chair with arms (e.g., wheelchair, bedside commode, etc.): 3  Moving from lying on back to sitting on the side of the bed?: 4  Moving to and from a bed to a chair (including a wheelchair)?: 4  Need to walk in hospital room?: 3  Climbing 3-5 steps with a railing?: 3  Basic Mobility Total Score: 21       Therapeutic Activities and Exercises:   pt received verbal instructions in role of PT and POC. Pt verbally expressed understanding of such.     Patient left up in chair with all lines intact and call button in reach..    GOALS:   Multidisciplinary Problems     Physical Therapy Goals        Problem: Physical Therapy Goal    Goal Priority Disciplines Outcome Goal Variances Interventions   Physical Therapy Goal     PT, PT/OT Ongoing, Progressing     Description:  Goals to be met by: 3/9/2020     Patient will increase functional independence with mobility by performin. Supine to sit with CGA- not met  2. Sit to stand transfer with Supervision- not met  3. Gait  x 150 feet with Supervision - not met                           Time Tracking:     PT Received On: 20  PT Start Time: 1342     PT Stop Time: 1354  PT Total Time (min): 12 min     Billable Minutes: Gait Training 12 min    Treatment Type: Treatment  PT/PTA: PT     PTA Visit Number: 0     Christa Malik, PT  2020

## 2020-02-26 NOTE — ASSESSMENT & PLAN NOTE
-HeartMate 3 Implanted 02/06/2020 as DT. Chest closed 2/7.   -HTS Primary.  -Implanted by Dr. Joshi.  -Continue Coumadin, Goal INR 2.0-3.0.  -Antiplatelets  mg.  -LDH is stable overall today. Will continue to monitor daily.  - stopped and repeat echo performed  -Repeat Echo 2/26 off  at 5300 rpms, EF 10%, LVEDD 6.3 (LVEDD on 2/19 was 6.2), mild TR and MR, Grade I LV diastolic dysfunction, mild RV enlargement, small pericardial effusion, ventricular septum midline  -CVP 8. Continue Bumex 1 mg bid  -BP: Doppler/MAP goal 60-90. Continue Norvasc 10 mg qd, Hydralazine 100 mg every 8 hours, and Aldactone 25 mg qd  -Not listed for OHTx.  -Continue PT/OT/VAD education     Procedure: Device Interrogation Including analysis of device parameters.  Current Settings: Ventricular Assist Device.  Review of device function is stable/unstable stable.    TXP LVAD INTERROGATIONS 2/26/2020 2/25/2020 2/25/2020 2/25/2020 2/25/2020 2/25/2020 2/24/2020   Type HeartMate3 HeartMate3 HeartMate3 - - HeartMate3 -   Flow 4.6 4.7 4.7 7.8 4.9 4.7 4.8   Speed 5300 5300 5300 5300 5300 5300 5300   PI 3.4 3.2 3.4 3.3 2.9 3.1 3.2   Power (Centeno) 4.0 4.0 4.0 4.0 4.0 4.0 4.0   LSL 4900 4900 4900 - - 4900 -   Pulsatility - Intermittent pulse - - - Intermittent pulse -

## 2020-02-26 NOTE — NURSING
Pt sitting up in chair with no family at bedside. Pt has not completed his workbook, has 6 pages left. Pt needs to focus on LVAD alarms and completed workbook in order to be checked off.     I informed CONG Peterson that I am hopeful to have patient checked off in the morning. Pt is also aware and agrees to focus on education.

## 2020-02-26 NOTE — ASSESSMENT & PLAN NOTE
- Platelet count now down trending after peak at 910 on 2/24  - Appreciate Hem/Onc's help - thrombocytosis most likely reactive to current acute illness and iron deficiency  - PO iron replacement started

## 2020-02-26 NOTE — PROGRESS NOTES
02/25/2020  Tommy Cage    Current provider:  Fernandez Gr Jr.,*      I, Tommy Cage, rounded on Saba Lott to ensure all mechanical assist device settings (IABP or VAD) were appropriate and all parameters were within limits.  I was able to ensure all back up equipment was present, the staff had no issues, and the Perfusion Department daily rounding was complete.    10:03 PM

## 2020-02-27 VITALS
HEIGHT: 67 IN | BODY MASS INDEX: 26.84 KG/M2 | WEIGHT: 171 LBS | HEART RATE: 91 BPM | OXYGEN SATURATION: 99 % | SYSTOLIC BLOOD PRESSURE: 78 MMHG | RESPIRATION RATE: 17 BRPM | TEMPERATURE: 98 F

## 2020-02-27 LAB
ALLENS TEST: ABNORMAL
ANION GAP SERPL CALC-SCNC: 9 MMOL/L (ref 8–16)
APTT BLDCRRT: 37.8 SEC (ref 21–32)
BASOPHILS # BLD AUTO: 0.05 K/UL (ref 0–0.2)
BASOPHILS NFR BLD: 0.4 % (ref 0–1.9)
BUN SERPL-MCNC: 30 MG/DL (ref 6–20)
CALCIUM SERPL-MCNC: 9.5 MG/DL (ref 8.7–10.5)
CHLORIDE SERPL-SCNC: 93 MMOL/L (ref 95–110)
CO2 SERPL-SCNC: 28 MMOL/L (ref 23–29)
CREAT SERPL-MCNC: 1.1 MG/DL (ref 0.5–1.4)
DIFFERENTIAL METHOD: ABNORMAL
EOSINOPHIL # BLD AUTO: 0.4 K/UL (ref 0–0.5)
EOSINOPHIL NFR BLD: 3.3 % (ref 0–8)
ERYTHROCYTE [DISTWIDTH] IN BLOOD BY AUTOMATED COUNT: 23.5 % (ref 11.5–14.5)
EST. GFR  (AFRICAN AMERICAN): >60 ML/MIN/1.73 M^2
EST. GFR  (NON AFRICAN AMERICAN): >60 ML/MIN/1.73 M^2
GLUCOSE SERPL-MCNC: 94 MG/DL (ref 70–110)
HCO3 UR-SCNC: 28.8 MMOL/L (ref 24–28)
HCT VFR BLD AUTO: 28.8 % (ref 40–54)
HGB BLD-MCNC: 8.8 G/DL (ref 14–18)
IMM GRANULOCYTES # BLD AUTO: 0.16 K/UL (ref 0–0.04)
IMM GRANULOCYTES NFR BLD AUTO: 1.3 % (ref 0–0.5)
INR PPP: 2.6 (ref 0.8–1.2)
LDH SERPL L TO P-CCNC: 282 U/L (ref 110–260)
LYMPHOCYTES # BLD AUTO: 1.9 K/UL (ref 1–4.8)
LYMPHOCYTES NFR BLD: 16.3 % (ref 18–48)
MAGNESIUM SERPL-MCNC: 2.1 MG/DL (ref 1.6–2.6)
MCH RBC QN AUTO: 26.6 PG (ref 27–31)
MCHC RBC AUTO-ENTMCNC: 30.6 G/DL (ref 32–36)
MCV RBC AUTO: 87 FL (ref 82–98)
MONOCYTES # BLD AUTO: 0.8 K/UL (ref 0.3–1)
MONOCYTES NFR BLD: 6.9 % (ref 4–15)
NEUTROPHILS # BLD AUTO: 8.6 K/UL (ref 1.8–7.7)
NEUTROPHILS NFR BLD: 71.8 % (ref 38–73)
NRBC BLD-RTO: 0 /100 WBC
PCO2 BLDA: 41.9 MMHG (ref 35–45)
PH SMN: 7.45 [PH] (ref 7.35–7.45)
PHOSPHATE SERPL-MCNC: 3.6 MG/DL (ref 2.7–4.5)
PLATELET # BLD AUTO: 718 K/UL (ref 150–350)
PMV BLD AUTO: 8.2 FL (ref 9.2–12.9)
PO2 BLDA: 35 MMHG (ref 40–60)
POC BE: 5 MMOL/L
POC SATURATED O2: 69 % (ref 95–100)
POC TCO2: 30 MMOL/L (ref 24–29)
POTASSIUM SERPL-SCNC: 3.9 MMOL/L (ref 3.5–5.1)
PROTHROMBIN TIME: 25 SEC (ref 9–12.5)
RBC # BLD AUTO: 3.31 M/UL (ref 4.6–6.2)
SAMPLE: ABNORMAL
SITE: ABNORMAL
SODIUM SERPL-SCNC: 130 MMOL/L (ref 136–145)
WBC # BLD AUTO: 11.91 K/UL (ref 3.9–12.7)

## 2020-02-27 PROCEDURE — 93750 INTERROGATION VAD IN PERSON: CPT | Mod: ,,, | Performed by: INTERNAL MEDICINE

## 2020-02-27 PROCEDURE — 27000248 HC VAD-ADDITIONAL DAY

## 2020-02-27 PROCEDURE — 85730 THROMBOPLASTIN TIME PARTIAL: CPT

## 2020-02-27 PROCEDURE — 85610 PROTHROMBIN TIME: CPT

## 2020-02-27 PROCEDURE — 83735 ASSAY OF MAGNESIUM: CPT

## 2020-02-27 PROCEDURE — 97530 THERAPEUTIC ACTIVITIES: CPT

## 2020-02-27 PROCEDURE — 99900035 HC TECH TIME PER 15 MIN (STAT)

## 2020-02-27 PROCEDURE — 94668 MNPJ CHEST WALL SBSQ: CPT

## 2020-02-27 PROCEDURE — 85025 COMPLETE CBC W/AUTO DIFF WBC: CPT

## 2020-02-27 PROCEDURE — 25000003 PHARM REV CODE 250: Performed by: INTERNAL MEDICINE

## 2020-02-27 PROCEDURE — A4216 STERILE WATER/SALINE, 10 ML: HCPCS | Performed by: STUDENT IN AN ORGANIZED HEALTH CARE EDUCATION/TRAINING PROGRAM

## 2020-02-27 PROCEDURE — 93750 PR INTERROGATE VENT ASSIST DEV, IN PERSON, W PHYSICIAN ANALYSIS: ICD-10-PCS | Mod: ,,, | Performed by: INTERNAL MEDICINE

## 2020-02-27 PROCEDURE — 25000003 PHARM REV CODE 250: Performed by: NURSE PRACTITIONER

## 2020-02-27 PROCEDURE — 82803 BLOOD GASES ANY COMBINATION: CPT

## 2020-02-27 PROCEDURE — 25000003 PHARM REV CODE 250: Performed by: STUDENT IN AN ORGANIZED HEALTH CARE EDUCATION/TRAINING PROGRAM

## 2020-02-27 PROCEDURE — 94761 N-INVAS EAR/PLS OXIMETRY MLT: CPT

## 2020-02-27 PROCEDURE — 80048 BASIC METABOLIC PNL TOTAL CA: CPT

## 2020-02-27 PROCEDURE — 99238 HOSP IP/OBS DSCHRG MGMT 30/<: CPT | Mod: 25,,, | Performed by: INTERNAL MEDICINE

## 2020-02-27 PROCEDURE — 84100 ASSAY OF PHOSPHORUS: CPT

## 2020-02-27 PROCEDURE — 83615 LACTATE (LD) (LDH) ENZYME: CPT

## 2020-02-27 PROCEDURE — 99238 PR HOSPITAL DISCHARGE DAY,<30 MIN: ICD-10-PCS | Mod: 25,,, | Performed by: INTERNAL MEDICINE

## 2020-02-27 RX ORDER — ATORVASTATIN CALCIUM 20 MG/1
20 TABLET, FILM COATED ORAL DAILY
Qty: 30 TABLET | Refills: 11 | Status: SHIPPED | OUTPATIENT
Start: 2020-02-27 | End: 2020-03-12 | Stop reason: SDUPTHER

## 2020-02-27 RX ORDER — POTASSIUM CHLORIDE 20 MEQ/1
40 TABLET, EXTENDED RELEASE ORAL DAILY
Qty: 60 TABLET | Refills: 1 | Status: SHIPPED | OUTPATIENT
Start: 2020-02-27 | End: 2020-03-12 | Stop reason: SDUPTHER

## 2020-02-27 RX ORDER — HYDRALAZINE HYDROCHLORIDE 100 MG/1
100 TABLET, FILM COATED ORAL EVERY 8 HOURS
Qty: 90 TABLET | Refills: 11 | Status: SHIPPED | OUTPATIENT
Start: 2020-02-27 | End: 2020-03-12 | Stop reason: SDUPTHER

## 2020-02-27 RX ORDER — OXYCODONE HYDROCHLORIDE 5 MG/1
5 TABLET ORAL EVERY 8 HOURS PRN
Qty: 21 TABLET | Refills: 0 | Status: ON HOLD | OUTPATIENT
Start: 2020-02-27 | End: 2021-01-01 | Stop reason: SDUPTHER

## 2020-02-27 RX ORDER — ASPIRIN 325 MG
325 TABLET ORAL DAILY
Qty: 30 TABLET | Refills: 11 | Status: SHIPPED | OUTPATIENT
Start: 2020-02-28 | End: 2020-03-12 | Stop reason: SDUPTHER

## 2020-02-27 RX ORDER — DOCUSATE SODIUM 100 MG/1
100 CAPSULE, LIQUID FILLED ORAL 2 TIMES DAILY PRN
Refills: 0 | COMMUNITY
Start: 2020-02-27 | End: 2020-03-12 | Stop reason: SDUPTHER

## 2020-02-27 RX ORDER — WARFARIN 7.5 MG/1
7.5 TABLET ORAL DAILY
Qty: 30 TABLET | Refills: 11 | Status: SHIPPED | OUTPATIENT
Start: 2020-02-27 | End: 2020-03-12 | Stop reason: SDUPTHER

## 2020-02-27 RX ORDER — AMLODIPINE BESYLATE 10 MG/1
10 TABLET ORAL DAILY
Qty: 30 TABLET | Refills: 11 | Status: SHIPPED | OUTPATIENT
Start: 2020-02-28 | End: 2020-03-12 | Stop reason: SDUPTHER

## 2020-02-27 RX ORDER — BUMETANIDE 1 MG/1
1 TABLET ORAL 2 TIMES DAILY
Qty: 60 TABLET | Refills: 11 | Status: SHIPPED | OUTPATIENT
Start: 2020-02-27 | End: 2020-03-12 | Stop reason: SDUPTHER

## 2020-02-27 RX ADMIN — DOCUSATE SODIUM 100 MG: 100 CAPSULE, LIQUID FILLED ORAL at 09:02

## 2020-02-27 RX ADMIN — ASPIRIN 325 MG ORAL TABLET 325 MG: 325 PILL ORAL at 09:02

## 2020-02-27 RX ADMIN — FERROUS SULFATE TAB EC 325 MG (65 MG FE EQUIVALENT) 325 MG: 325 (65 FE) TABLET DELAYED RESPONSE at 09:02

## 2020-02-27 RX ADMIN — HYDRALAZINE HYDROCHLORIDE 100 MG: 25 TABLET ORAL at 01:02

## 2020-02-27 RX ADMIN — HYDRALAZINE HYDROCHLORIDE 100 MG: 25 TABLET ORAL at 06:02

## 2020-02-27 RX ADMIN — Medication 10 ML: at 12:02

## 2020-02-27 RX ADMIN — BUMETANIDE 1 MG: 1 TABLET ORAL at 09:02

## 2020-02-27 RX ADMIN — Medication 10 ML: at 06:02

## 2020-02-27 RX ADMIN — POTASSIUM CHLORIDE 20 MEQ: 1500 TABLET, EXTENDED RELEASE ORAL at 09:02

## 2020-02-27 RX ADMIN — AMLODIPINE BESYLATE 10 MG: 10 TABLET ORAL at 09:02

## 2020-02-27 RX ADMIN — POLYETHYLENE GLYCOL 3350 17 G: 17 POWDER, FOR SOLUTION ORAL at 09:02

## 2020-02-27 RX ADMIN — SPIRONOLACTONE 25 MG: 25 TABLET ORAL at 09:02

## 2020-02-27 RX ADMIN — PANTOPRAZOLE SODIUM 40 MG: 40 TABLET, DELAYED RELEASE ORAL at 09:02

## 2020-02-27 NOTE — PROGRESS NOTES
[facility]  Heart Transplant  Progress Note    Patient Name: Saba Lott  MRN: [id number]  Admission Date: 1/15/2020  Hospital Length of Stay: 43 days  Attending Physician: Fernandez Gr Jr.,*  Primary Care Provider: Primary Doctor No  Principal Problem:LVAD (left ventricular assist device) present    Subjective:     Interval History: No complaints, no events overnight. Feels generally well. Denies NVD, SOB, Chest pain.    Scheduled Meds:   amLODIPine  10 mg Oral Daily    aspirin  325 mg Per NG tube Daily    bumetanide  1 mg Oral BID    docusate sodium  100 mg Oral BID    ferrous sulfate  325 mg Oral BID    hydrALAZINE  100 mg Oral Q8H    pantoprazole  40 mg Oral Daily    polyethylene glycol  17 g Oral Daily    potassium chloride  20 mEq Oral BID    sodium chloride 0.9%  10 mL Intravenous Q6H    spironolactone  25 mg Oral Daily    warfarin  7.5 mg Oral Daily     PRN Meds:sodium chloride, acetaminophen, albuterol sulfate, benzonatate, bisacodyL, Dextrose 10% Bolus, Dextrose 10% Bolus, Dextrose 10% Bolus, Dextrose 10% Bolus, Dextrose 10% Bolus, Dextrose 10% Bolus, diphenhydrAMINE, magnesium hydroxide 400 mg/5 ml, oxyCODONE, oxyCODONE, Flushing PICC Protocol **AND** sodium chloride 0.9% **AND** sodium chloride 0.9%    Review of patient's allergies indicates:   Allergen Reactions    Iodine and iodide containing products      Objective:     Vital Signs (Most Recent):  Temp: 98.7 °F (37.1 °C) (02/27/20 0900)  Pulse: (!) 114 (02/27/20 0713)  Resp: 18 (02/27/20 0900)  BP: 112/72 (02/27/20 0900)  SpO2: 98 % (02/27/20 0900) Vital Signs (24h Range):  Temp:  [97 °F (36.1 °C)-98.7 °F (37.1 °C)] 98.7 °F (37.1 °C)  Pulse:  [] 114  Resp:  [15-18] 18  SpO2:  [96 %-98 %] 98 %  BP: ()/(0-74) 112/72     Patient Vitals for the past 72 hrs (Last 3 readings):   Weight   02/26/20 1000 77.6 kg (171 lb)   02/26/20 0630 77.9 kg (171 lb 11.8 oz)   02/25/20 0500 77.1 kg (169 lb 15.6 oz)     Body  mass index is 26.78 kg/m².      Intake/Output Summary (Last 24 hours) at 2/27/2020 1023  Last data filed at 2/27/2020 0800  Gross per 24 hour   Intake 1680 ml   Output 2300 ml   Net -620 ml       Hemodynamic Parameters:  CVP:  [8 mmHg] 8 mmHg    Telemetry: Reviewed - Afib noted    Physical Exam   Constitutional: He appears well-developed and well-nourished. No distress.   HENT:   Head: Normocephalic and atraumatic.   Eyes: EOM are normal.   Neck: Normal range of motion. JVD present.   JVD one finger above the clavicle at 90 degrees   Cardiovascular:   Smooth VAD hum   Pulmonary/Chest: Effort normal. No respiratory distress.   Abdominal: Soft. He exhibits no distension. There is no tenderness.   Musculoskeletal: He exhibits no edema.   Neurological: He is alert.   Skin: Skin is warm and dry. Capillary refill takes less than 2 seconds. He is not diaphoretic.   Psychiatric: He has a normal mood and affect.       Significant Labs:  CBC:  Recent Labs   Lab 02/25/20  0511 02/26/20  0308 02/27/20  0330   WBC 14.87* 13.53* 11.91   RBC 3.33* 3.28* 3.31*   HGB 8.6* 8.6* 8.8*   HCT 29.5* 29.4* 28.8*   * 802* 718*   MCV 89 90 87   MCH 25.8* 26.2* 26.6*   MCHC 29.2* 29.3* 30.6*     BNP:  Recent Labs   Lab 02/21/20  0515 02/24/20  0415 02/26/20  0308   * 515* 772*     CMP:  Recent Labs   Lab 02/21/20  0515  02/24/20  0415 02/25/20  0511 02/26/20  0308 02/27/20  0330   *   < > 111* 129* 135* 94   CALCIUM 9.3   < > 10.0 9.9 9.7 9.5   ALBUMIN 2.6*  --  3.1*  --  3.0*  --    PROT 8.2  --  9.0*  --  8.4  --    *   < > 134* 132* 133* 130*   K 3.6   < > 4.2 4.1 4.0 3.9   CO2 34*   < > 33* 28 26 28   CL 90*   < > 89* 91* 94* 93*   BUN 23*   < > 37* 39* 35* 30*   CREATININE 1.0   < > 1.3 1.3 1.4 1.1   ALKPHOS 142*  --  163*  --  143*  --    ALT 23  --  22  --  18  --    AST 28  --  27  --  26  --    BILITOT 0.6  --  0.7  --  0.5  --     < > = values in this interval not displayed.      Coagulation:   Recent Labs    Lab 02/25/20  0511 02/26/20  0308 02/27/20  0330   INR 2.2* 2.4* 2.6*   APTT 36.3* 36.1* 37.8*     LDH:  Recent Labs   Lab 02/25/20  0511 02/26/20  0308 02/27/20  0330   * 330* 282*     Microbiology:  Microbiology Results (last 7 days)     ** No results found for the last 168 hours. **          I have reviewed all pertinent labs within the past 24 hours.    Estimated Creatinine Clearance: 70.9 mL/min (based on SCr of 1.1 mg/dL).    Diagnostic Results:  I have reviewed and interpreted all pertinent imaging results/findings within the past 24 hours.    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 Chronic MARC - Class II-III and mild LE edema transferred from [facility] for further management of cardigogenic shock and possible advanced options.     * LVAD (left ventricular assist device) present  -HeartMate 3 Implanted 02/06/2020 as DT. Chest closed 2/7.   -HTS Primary.  -Implanted by Dr. Joshi.  -Continue Coumadin, Goal INR 2.0-3.0.  -Antiplatelets  mg.  -LDH is stable overall today. Will continue to monitor daily.  - stopped and repeat echo performed, reviewed in epic  -Repeat Echo 2/26 off  at 5300 rpms, EF 10%, LVEDD 6.3 (LVEDD on 2/19 was 6.2), mild TR and MR, Grade I LV diastolic dysfunction, mild RV enlargement, small pericardial effusion, ventricular septum midline  -CVP 8. Continue Bumex 1 mg bid  -BP: Doppler/MAP goal 60-90. Continue Norvasc 10 mg qd, Hydralazine 100 mg every 8 hours, and Aldactone 25 mg qd  -Not listed for OHTx.  -Continue PT/OT/VAD education     Procedure: Device Interrogation Including analysis of device parameters.  Current Settings: Ventricular Assist Device.  Review of device function is stable/unstable stable.    TXP LVAD INTERROGATIONS 2/27/2020 2/27/2020 2/26/2020  2/26/2020 2/26/2020 2/26/2020 2/26/2020   Type HeartMate3 HeartMate3 HeartMate3 HeartMate3 HeartMate3 HeartMate3 HeartMate3   Flow 4.5 4.6 4.6 4.7 4.8 4.7 4.6   Speed 5300 5300 5300 5300 5300 5300 5300   PI 3.4 3.2 3.2 3.2 3.2 3.3 3.4   Power (Centeno) 4.0 4.0 3.9 4.0 4.0 4.0 4.0   LSL 4900 4900 4900 - - 4900 4900   Pulsatility Intermittent pulse Intermittent pulse Intermittent pulse Intermittent pulse Intermittent pulse Intermittent pulse -       Thrombocytosis  - Platelet count now down trending after peak at 910 on 2/24  - Appreciate Hem/Onc's help - thrombocytosis most likely reactive to current acute illness and iron deficiency  - PO iron replacement started    Anemia due to acute blood loss  -S/P 1u pRBC 2/19  -low iron stores, started IV iron 2/19 and stopped on 2/22  -Started on PO iron    Leukocytosis  - Afebrile.   - WBC is trending down    Acute hyperglycemia  -HgA1C 6.6  -Endocrine following    Moderate malnutrition  - Boost added 1/27. Dr Joshi advised pt to not consume.   - Last Prealbumin 16.    Acute on chronic combined systolic and diastolic heart failure  - See LVAD    Morbid obesity  - Weight down considerably since admit with diuresis.  - Body mass index is 26.78 kg/m².      ANJELICA (acute kidney injury)  -Cr 3.0 on admit and was as high as 4.3 at OSH prior to transfer. Renal function was normal 8 months ago.  -Cr has since normalized.     Cardiogenic shock  - NICM; Likely Etoh induced  -Transferred from [facility] with IABP at 1:1 for further level of care. IABP removed 1/25 and replaced 2/3.   -S/P HM3 on 2/3.     Deep vein thrombosis (DVT) of right lower extremity  - Unsure of timing but was on eliquis PTA.   - now on Coumadin post LVAD    AICD (automatic cardioverter/defibrillator) present  - Medtronic single chamber ICD placed 2019 for primary prevention        Shani Holliday PA-C  Heart Transplant  [facility]

## 2020-02-27 NOTE — NURSING
Patient AAO with no family at bedside. Spent 60 mins educating patient.       Educational HM 3 DVD provided by company has been reviewed by patient.  VAD binder reviewed with patient including what to document and the meanings of all the VAD parameters (Speed, Flow, PI and Power). Additionally, it was pointed out to the patient where to find the proper phone numbers to call each of the individual VAD coordinators. We spoke about when to page the VAD coordinator vs when to call during normal business hours. We discussed possible medication the patient may encounter with a VAD were discussed including not changing prescriptions without talking to their VAD team. Explained that the pharmacist will further discuss before discharge and create them a purple card. Patient was able to successfully page the VAD coordinator and point out the proper places to find the Coordinator on calls phone number/emergency number. Patient was able to explain what the VAD is and how it works. Patient was able to successfully explained that if bahena are greater then 10w they will page the VAD coordinator. Proper severe weather plans were discussed including proper generator usage and creating an evacuation plan. Patient explains they are not to use the mobile power unit if it is plugged into a generator. Patient was able to successfully change controller and verbalize to page VAD coordinator prior to initiating controller change. Patient understands the locking mechanism for the driveline, sleep mode, and how to jump start the controller as needed. Patient was able to successfully explain the items that must be at bedside at all times including flashlight, batteries, and clips. Patient was able to verbalize understanding that he must sleep on the module power unit and not batteries. Patient was able to verbalize the alarms and all their meanings for the mobile power unit. Patient slept at least one night on the mobile power unit while  "staying in the hospital. We discussed the battery charger. Patient was able to explain that battery lifespan, charge length, and how to rotate batteries. Patient was able to explain how to calibrate batteries and when the proper time to do so. Patient was able to explain how to tell when the pump is running and what to do if it is not. Patient was able to verbalize  alarms hazard vs advisory alarms.    Patient IS checked off on alarms per VAD coordinator.     There is conflicting documentation from [facility] RNs regarding if pt's caregiver, mom, is checked off on the dressing change. I spoke to the patient's mother via phone call who states that she was told that "she should be" checked off on the dressing change since she completed it successfully 3 times. However, a nurse did not tell her that she was checked off.   I reviewed the above conflict with U charge RN, Sharlene, as this matter would keep the patient from discharging today. Sharlene informs that the patient's mother IS checked off on the VAD dressing change per review of charge report.   I reviewed this with CONG Tabor.     "

## 2020-02-27 NOTE — PT/OT/SLP PROGRESS
Occupational Therapy      Patient Name:  Saba Lott   MRN:  [id number]    Patient not seen today secondary to pt awaiting discharge requesting to postpone therapy at the moment. Pt had no further therapy needs or concerns. Will follow-up on 2/28/20 if remains in house.    Yarelis Castellano, OT  2/27/2020

## 2020-02-27 NOTE — PLAN OF CARE
"Problem: Adult Inpatient Plan of Care  Goal: Plan of Care Review  LOC: alert and oriented x 4.   SKIN: The skin is warm, dry. LVAD dressing CDI. MSI dressing CDI.  RESPIRATORY: Respirations are WNL, even and unlabored. Normal effort and rate noted. No accessory muscle use noted. Pt. On room air.  CARDIAC: Patient runs conduction defect on the monitor in the 90's-100's.  ABDOMEN: Soft / rounded and non tender to palpation. No distention noted.   URINARY: continent with urinal at bedside.  EXTREMITIES: Extremities are WNL; general weakness throughout. Patient is a stand by assist.  Neuro: Pt able to follow commands and is calm and cooperative with care.      CVP: 8  SVO2: 69    POC reviewed with patient. VSS; MAPS in the 60-80's. Patient is free of injuries and falls. Patient has no c/o pain or discomfort. Patient finished LVAD work book; patient not checked off on alarms yet. Patient mother is not checked off on LVAD dressing change; PARKER Story informed mother that she needed to get checked off the next time she comes in. Patient mother has completed dressing change 2 x. RN completed LVAD dressing change using sterile technique; DLES is a "2." Dressing is CDI. RN completed MSI care; [facility] applied and dressing CDI. LVAD numbers WNL; no alarms over night. All questions were addressed. WCTM.  "

## 2020-02-27 NOTE — PROGRESS NOTES
02/27/2020  Abelardo Sepulveda    Current provider:  Fernandez Gr Jr.,*      I, Abelardo Sepulveda, rounded on Saba Lott to ensure all mechanical assist device settings (IABP or VAD) were appropriate and all parameters were within limits.  I was able to ensure all back up equipment was present, the staff had no issues, and the Perfusion Department daily rounding was complete.    10:54 AM

## 2020-02-27 NOTE — PROGRESS NOTES
Seen pt in hospital, inventoried pt's equipment for discharge, all equipment is accounted for. Pt verbalized understanding of LVAD system and components. Will follow up at next clinic visit.

## 2020-02-27 NOTE — ASSESSMENT & PLAN NOTE
-HeartMate 3 Implanted 02/06/2020 as DT. Chest closed 2/7.   -HTS Primary.  -Implanted by Dr. Joshi.  -Continue Coumadin, Goal INR 2.0-3.0.  -Antiplatelets  mg.  -LDH is stable overall today. Will continue to monitor daily.  - stopped and repeat echo performed, reviewed in epic  -Repeat Echo 2/26 off  at 5300 rpms, EF 10%, LVEDD 6.3 (LVEDD on 2/19 was 6.2), mild TR and MR, Grade I LV diastolic dysfunction, mild RV enlargement, small pericardial effusion, ventricular septum midline  -CVP 8. Continue Bumex 1 mg bid  -BP: Doppler/MAP goal 60-90. Continue Norvasc 10 mg qd, Hydralazine 100 mg every 8 hours, and Aldactone 25 mg qd  -Not listed for OHTx.  -Continue PT/OT/VAD education     Procedure: Device Interrogation Including analysis of device parameters.  Current Settings: Ventricular Assist Device.  Review of device function is stable/unstable stable.    TXP LVAD INTERROGATIONS 2/27/2020 2/27/2020 2/26/2020 2/26/2020 2/26/2020 2/26/2020 2/26/2020   Type HeartMate3 HeartMate3 HeartMate3 HeartMate3 HeartMate3 HeartMate3 HeartMate3   Flow 4.5 4.6 4.6 4.7 4.8 4.7 4.6   Speed 5300 5300 5300 5300 5300 5300 5300   PI 3.4 3.2 3.2 3.2 3.2 3.3 3.4   Power (Centeno) 4.0 4.0 3.9 4.0 4.0 4.0 4.0   LSL 4900 4900 4900 - - 4900 4900   Pulsatility Intermittent pulse Intermittent pulse Intermittent pulse Intermittent pulse Intermittent pulse Intermittent pulse -

## 2020-02-27 NOTE — SUBJECTIVE & OBJECTIVE
Interval History: No complaints, no events overnight. Feels generally well. Denies NVD, SOB, Chest pain.    Scheduled Meds:   amLODIPine  10 mg Oral Daily    aspirin  325 mg Per NG tube Daily    bumetanide  1 mg Oral BID    docusate sodium  100 mg Oral BID    ferrous sulfate  325 mg Oral BID    hydrALAZINE  100 mg Oral Q8H    pantoprazole  40 mg Oral Daily    polyethylene glycol  17 g Oral Daily    potassium chloride  20 mEq Oral BID    sodium chloride 0.9%  10 mL Intravenous Q6H    spironolactone  25 mg Oral Daily    warfarin  7.5 mg Oral Daily     PRN Meds:sodium chloride, acetaminophen, albuterol sulfate, benzonatate, bisacodyL, Dextrose 10% Bolus, Dextrose 10% Bolus, Dextrose 10% Bolus, Dextrose 10% Bolus, Dextrose 10% Bolus, Dextrose 10% Bolus, diphenhydrAMINE, magnesium hydroxide 400 mg/5 ml, oxyCODONE, oxyCODONE, Flushing PICC Protocol **AND** sodium chloride 0.9% **AND** sodium chloride 0.9%    Review of patient's allergies indicates:   Allergen Reactions    Iodine and iodide containing products      Objective:     Vital Signs (Most Recent):  Temp: 98.7 °F (37.1 °C) (02/27/20 0900)  Pulse: (!) 114 (02/27/20 0713)  Resp: 18 (02/27/20 0900)  BP: 112/72 (02/27/20 0900)  SpO2: 98 % (02/27/20 0900) Vital Signs (24h Range):  Temp:  [97 °F (36.1 °C)-98.7 °F (37.1 °C)] 98.7 °F (37.1 °C)  Pulse:  [] 114  Resp:  [15-18] 18  SpO2:  [96 %-98 %] 98 %  BP: ()/(0-74) 112/72     Patient Vitals for the past 72 hrs (Last 3 readings):   Weight   02/26/20 1000 77.6 kg (171 lb)   02/26/20 0630 77.9 kg (171 lb 11.8 oz)   02/25/20 0500 77.1 kg (169 lb 15.6 oz)     Body mass index is 26.78 kg/m².      Intake/Output Summary (Last 24 hours) at 2/27/2020 1023  Last data filed at 2/27/2020 0800  Gross per 24 hour   Intake 1680 ml   Output 2300 ml   Net -620 ml       Hemodynamic Parameters:  CVP:  [8 mmHg] 8 mmHg    Telemetry: Reviewed - Afib noted    Physical Exam   Constitutional: He appears well-developed and  well-nourished. No distress.   HENT:   Head: Normocephalic and atraumatic.   Eyes: EOM are normal.   Neck: Normal range of motion. JVD present.   JVD one finger above the clavicle at 90 degrees   Cardiovascular:   Smooth VAD hum   Pulmonary/Chest: Effort normal. No respiratory distress.   Abdominal: Soft. He exhibits no distension. There is no tenderness.   Musculoskeletal: He exhibits no edema.   Neurological: He is alert.   Skin: Skin is warm and dry. Capillary refill takes less than 2 seconds. He is not diaphoretic.   Psychiatric: He has a normal mood and affect.       Significant Labs:  CBC:  Recent Labs   Lab 02/25/20  0511 02/26/20  0308 02/27/20  0330   WBC 14.87* 13.53* 11.91   RBC 3.33* 3.28* 3.31*   HGB 8.6* 8.6* 8.8*   HCT 29.5* 29.4* 28.8*   * 802* 718*   MCV 89 90 87   MCH 25.8* 26.2* 26.6*   MCHC 29.2* 29.3* 30.6*     BNP:  Recent Labs   Lab 02/21/20  0515 02/24/20  0415 02/26/20  0308   * 515* 772*     CMP:  Recent Labs   Lab 02/21/20  0515  02/24/20  0415 02/25/20  0511 02/26/20  0308 02/27/20  0330   *   < > 111* 129* 135* 94   CALCIUM 9.3   < > 10.0 9.9 9.7 9.5   ALBUMIN 2.6*  --  3.1*  --  3.0*  --    PROT 8.2  --  9.0*  --  8.4  --    *   < > 134* 132* 133* 130*   K 3.6   < > 4.2 4.1 4.0 3.9   CO2 34*   < > 33* 28 26 28   CL 90*   < > 89* 91* 94* 93*   BUN 23*   < > 37* 39* 35* 30*   CREATININE 1.0   < > 1.3 1.3 1.4 1.1   ALKPHOS 142*  --  163*  --  143*  --    ALT 23  --  22  --  18  --    AST 28  --  27  --  26  --    BILITOT 0.6  --  0.7  --  0.5  --     < > = values in this interval not displayed.      Coagulation:   Recent Labs   Lab 02/25/20  0511 02/26/20  0308 02/27/20  0330   INR 2.2* 2.4* 2.6*   APTT 36.3* 36.1* 37.8*     LDH:  Recent Labs   Lab 02/25/20  0511 02/26/20  0308 02/27/20  0330   * 330* 282*     Microbiology:  Microbiology Results (last 7 days)     ** No results found for the last 168 hours. **          I have reviewed all pertinent labs  within the past 24 hours.    Estimated Creatinine Clearance: 70.9 mL/min (based on SCr of 1.1 mg/dL).    Diagnostic Results:  I have reviewed and interpreted all pertinent imaging results/findings within the past 24 hours.

## 2020-02-27 NOTE — ASSESSMENT & PLAN NOTE
- NICM; Likely Etoh induced  -Transferred from [facility] with IABP at 1:1 for further level of care. IABP removed 1/25 and replaced 2/3.   -S/P HM3 on 2/3.

## 2020-02-27 NOTE — PT/OT/SLP PROGRESS
Physical Therapy Treatment    Patient Name:  Saba Lott   MRN:  [id number]    Recommendations:     Discharge Recommendations:  home health PT, home health OT   Discharge Equipment Recommendations: bedside commode   Barriers to discharge: None    Assessment:     Saba Lott is a 54 y.o. male admitted with a medical diagnosis of LVAD (left ventricular assist device) present.  He presents with the following impairments/functional limitations:  weakness, impaired functional mobilty, impaired endurance, gait instability, impaired balance, impaired self care skills, impaired cardiopulmonary response to activity. Pt continues to require assist to transfer to stand from standard chair height while maintaining sternal precautions. Ambulating greater than household distance with mild instability throughout but without AD or physical assist needed. Pt would continue to benefit from skilled acute PT in order to address current deficits and progress functional mobility. PT POC decreased to 4x/week 2* progression of mobility.     Rehab Prognosis: Good; patient would benefit from acute skilled PT services to address these deficits and reach maximum level of function.    Recent Surgery: Procedure(s) (LRB):  CLOSURE, WOUND, STERNUM (N/A)  WASHOUT  INSERTION, GRAFT, PERICARDIUM  APPLICATION, WOUND VAC 20 Days Post-Op    Plan:     During this hospitalization, patient to be seen 4 x/week to address the identified rehab impairments via gait training, therapeutic activities, therapeutic exercises, neuromuscular re-education and progress toward the following goals:    · Plan of Care Expires:  03/13/20    Subjective     Chief Complaint: none noted   Patient/Family Comments/goals: Pt reports feeling prepared for d/c.  Pain/Comfort:  · Pain Rating 1: 0/10      Objective:     Communicated with RN prior to session.  Patient found up in chair with telemetry, LVAD upon PT entry to room.     General Precautions: Standard, fall, LVAD, sternal    Orthopedic Precautions:N/A   Braces: N/A     Functional Mobility:  · Transfers:     · Sit to Stand:  minimum assistance with no AD from bedside chair  · Cues provided for maintaining sternal precautions, ant weight-shift, and use of forward momentum to assist with ease of transfer  · Gait: ~250 ft. with CGA and no AD  · Emergency bag present throughout; required cues to recall emergency bag prior to exiting room  · Demo'd decreased ladarius, decreased step length, impaired weight-shifting ability, and mild instability  · Denied SOB throughout       AM-PAC 6 CLICK MOBILITY  Turning over in bed (including adjusting bedclothes, sheets and blankets)?: 4  Sitting down on and standing up from a chair with arms (e.g., wheelchair, bedside commode, etc.): 3  Moving from lying on back to sitting on the side of the bed?: 3  Moving to and from a bed to a chair (including a wheelchair)?: 3  Need to walk in hospital room?: 3  Climbing 3-5 steps with a railing?: 3  Basic Mobility Total Score: 19       Therapeutic Activities and Exercises:   Pt found on LVAD battery power with consolidation bag organized. Edu provided for ensuring driveline free from obstruction 2* shoulder strap found around driveline. Donned consolidation bag with set-up assist. Remained on battery power at end of session.  Pt and mother denied acute PT questions/concerns and report feeling adequately prepared for d/c.     Patient left up in chair with all lines intact, call button in reach, RN notified and pt's mother present..    GOALS:   Multidisciplinary Problems     Physical Therapy Goals     Not on file          Multidisciplinary Problems (Resolved)        Problem: Physical Therapy Goal    Goal Priority Disciplines Outcome Goal Variances Interventions   Physical Therapy Goal   (Resolved)     PT, PT/OT Met     Description:  Goals to be met by: 3/9/2020     Patient will increase functional independence with mobility by performin. Supine to sit with  CGA- not met  2. Sit to stand transfer with Supervision- not met  3. Gait  x 150 feet with Supervision - not met                           Time Tracking:     PT Received On: 02/27/20  PT Start Time: 1240     PT Stop Time: 1249  PT Total Time (min): 9 min     Billable Minutes: Therapeutic Activity 9    Treatment Type: Treatment  PT/PTA: PT     PTA Visit Number: 0     Pta Montgomery, PT, DPT   2/27/2020  [phone]

## 2020-02-27 NOTE — DISCHARGE SUMMARY
[facility]  Heart Transplant  Discharge Summary      Patient Name: Saba Lott  MRN: [id number]  Admission Date: 1/15/2020  Hospital Length of Stay: 43 days  Discharge Date and Time: 02/27/2020 11:52 AM  Attending Physician: Fernandez Gr Jr.,*   Discharging Provider: Shani Holliday PA-C  Primary Care Provider: Primary Doctor No     HPI:  54 yo BM with history of nonischemic CMP with EF 20% with chronic heart failure s/p ICD implantation for primary prevention on 2/15/19 ([employer]), tobacco and alcohol abuse (quit 2018), hx of DVT right leg, HTN. Chronic MARC - Class II-III and mild LE edema.    Admitted to [facility] on Thursday due to severe SOB for a week with associated orthopnea, MARC, and PND unable to lie flat and found to be in acute diastolic heart failure. States he normally weighs around 219 but up to 254lb on admission tonight. Says he hasn't been the most compliant in terms of fluid restriction and daily weights but has avoided salt. BNP on admission was 2375. BUN/CRT 32/1.4 He was started on IV diuretics but continued to deteriorate. Creatinine continued to increase and reached 4.3 with oliguria. He was started on CRRT for a few days and tolerated well. Renal u/s was negative for obstruction and liver u/s without findings of cirrhosis. All of this was progression of cardiorenal syndrome with liver congestion from severe volume overload. On arrival vitals stable and in no acute distress. He has obvious anasarca up to the chest. He did have uop of 500 at time of arrival also. Transferred to [facility] for higher level care.    Procedure(s) (LRB):  CLOSURE, WOUND, STERNUM (N/A)  WASHOUT  INSERTION, GRAFT, PERICARDIUM  APPLICATION, WOUND VAC     Hospital Course: IABP was removed 1/25 and replaced 2/3. He was worked up for LVAD and was presented on on 1/29/20 and was declined for transplant listing due to debility, poor renal function and lack of backup caregiver. He was  deferred for VAD until renal function and physical activity improved. Patient progressed well and eventually underwent . Patient underwent HeartMate 3 Implanted 02/06/2020 as DT by Dr Joshi. Chest was closed 2/7. Post op course was complicated by RV failure requiring  and high dose diuretics. He was eventually weaned off  and PICC was removed prior to discharge. Weight on day of discharge was 171 lbs and CVp was 8 with JVD at clavicle sitting upright in chair. Speed was 5300 and echo was reviewed by Dr Gr. He has post op thrombocytosis which heme-onc was consulted for and believed to be reactive (was trending down prior to discharge). See below for med rec.    Consults (From admission, onward)        Status Ordering Provider     Inpatient consult to Cardiothoracic Surgery  Once     Provider:  (Not yet assigned)    Completed KHOA WHATLEY     Inpatient consult to Endocrinology  Once     Provider:  (Not yet assigned)    Completed WOOD CASH     Inpatient consult to Endocrinology  Once     Provider:  (Not yet assigned)    Completed FACUNDO RAMOS     Inpatient consult to Hematology/Oncology  Once     Provider:  (Not yet assigned)    Completed DANILO MCNULTY     Inpatient consult to Infectious Diseases  Once     Provider:  (Not yet assigned)    Completed DANILO MCNULTY     Inpatient consult to Interventional Cardiology  Once     Provider:  (Not yet assigned)    Completed JULIO CASTREJON     Inpatient consult to Midline team  Once     Provider:  (Not yet assigned)    Completed MADELINE GR JR     Inpatient consult to Palliative Care  Once     Provider:  (Not yet assigned)    Completed HELEN FELTON     Inpatient consult to PICC team ([facility])  Once     Provider:  (Not yet assigned)    Completed MARGOT TRACY     Inpatient Consult to Pre-VAD Coordinator  Once     Provider:  (Not yet assigned)    Completed SHAWNEE LACEY     Inpatient consult to Registered  Dietitian/Nutritionist  Once     Provider:  (Not yet assigned)    Completed SHAWNEE LACEY     Inpatient consult to Registered Dietitian/Nutritionist  Once     Provider:  (Not yet assigned)    Completed FACUNDO RAMOS     Inpatient consult to Registered Dietitian/Nutritionist  Once     Provider:  (Not yet assigned)    Completed GOGO FOSS          Significant Diagnostic Studies: Labs:   CMP   Recent Labs   Lab 02/26/20  0308 02/27/20  0330   * 130*   K 4.0 3.9   CL 94* 93*   CO2 26 28   * 94   BUN 35* 30*   CREATININE 1.4 1.1   CALCIUM 9.7 9.5   PROT 8.4  --    ALBUMIN 3.0*  --    BILITOT 0.5  --    ALKPHOS 143*  --    AST 26  --    ALT 18  --    ANIONGAP 13 9   ESTGFRAFRICA >60.0 >60.0   EGFRNONAA 56.2* >60.0   , CBC   Recent Labs   Lab 02/26/20  0308 02/27/20  0330   WBC 13.53* 11.91   HGB 8.6* 8.8*   HCT 29.4* 28.8*   * 718*    and INR   Lab Results   Component Value Date    INR 2.6 (H) 02/27/2020    INR 2.4 (H) 02/26/2020    INR 2.2 (H) 02/25/2020       Pending Diagnostic Studies:     Procedure Component Value Units Date/Time    APTT [[id number]] Collected:  02/13/20 0120    Order Status:  Sent Lab Status:  In process Updated:  02/13/20 0120    Specimen:  Blood     APTT [[id number]] Collected:  02/13/20 0120    Order Status:  Sent Lab Status:  In process Updated:  02/13/20 0120    Specimen:  Blood     APTT [[id number]] Collected:  02/12/20 2216    Order Status:  Sent Lab Status:  In process Updated:  02/12/20 2216    Specimen:  Blood     Basic metabolic panel [[id number]] Collected:  02/13/20 0120    Order Status:  Sent Lab Status:  In process Updated:  02/13/20 0120    Specimen:  Blood     CBC auto differential [[id number]] Collected:  02/06/20 0704    Order Status:  Sent Lab Status:  In process Updated:  02/06/20 0705    Specimen:  Blood     CBC auto differential [[id number]] Collected:  01/21/20 0352    Order Status:  Sent Lab Status:  In process Updated:  01/21/20 0353    Specimen:   Blood     Comprehensive metabolic panel [[id number]] Collected:  01/25/20 1640    Order Status:  Sent Lab Status:  In process Updated:  01/25/20 1640    Specimen:  Blood     Magnesium - if not done in ED [[id number]] Collected:  01/25/20 1640    Order Status:  Sent Lab Status:  In process Updated:  01/25/20 1640    Specimen:  Blood     Magnesium - if not done in ED [[id number]] Collected:  01/20/20 1757    Order Status:  Sent Lab Status:  In process Updated:  01/20/20 1757    Specimen:  Blood     Potassium [[id number]] Collected:  02/16/20 2356    Order Status:  Sent Lab Status:  In process Updated:  02/16/20 2356    Specimen:  Blood     Potassium [[id number]] Collected:  02/12/20 1325    Order Status:  Sent Lab Status:  In process Updated:  02/12/20 1326    Specimen:  Blood         Final Active Diagnoses:    Diagnosis Date Noted POA    PRINCIPAL PROBLEM:  LVAD (left ventricular assist device) present [Z95.811] 02/06/2020 Not Applicable    Thrombocytosis [D47.3] 02/22/2020 Unknown    Central venous catheter in place [Z78.9]  No    Anticoagulation monitoring, INR range 2-3 [Z79.01]  Not Applicable    Presence of left ventricular assist device (LVAD) [Z95.811]  Not Applicable    Anemia due to acute blood loss [D62] 02/19/2020 No    Leukocytosis [D72.829] 02/17/2020 No    Goals of care, counseling/discussion [Z71.89] 01/27/2020 Not Applicable    Advance care planning [Z71.89]  Not Applicable    Moderate malnutrition [E44.0] 01/23/2020 Yes    Acute hyperglycemia [R73.9] 01/23/2020 Yes    Acute on chronic combined systolic and diastolic heart failure [I50.43]  Yes    Morbid obesity [E66.01]  Yes    Cardiogenic shock [R57.0] 01/15/2020 Yes    History of alcohol abuse [F10.11] 01/15/2020 Yes    History of tobacco abuse [Z87.891] 01/15/2020 Not Applicable    ANJELICA (acute kidney injury) [N17.9] 01/15/2020 No    AICD (automatic cardioverter/defibrillator) present [Z95.810] 02/27/2019 Yes    Deep vein thrombosis  (DVT) of right lower extremity [I82.401] 02/27/2019 Yes    Non-ischemic cardiomyopathy [I42.8] 02/13/2019 Yes      Problems Resolved During this Admission:    Diagnosis Date Noted Date Resolved POA    Abnormal pulse [R09.89]  02/21/2020 Yes    Acute respiratory insufficiency, postoperative [J95.89]  02/17/2020 No    Pre-transplant evaluation for heart transplant [Z01.818]  01/30/2020 Not Applicable    Organ transplant candidate [Z76.82]  01/30/2020 Not Applicable    Encounter for management of intra-aortic balloon pump [Z45.09]  01/27/2020 Not Applicable    Low output heart failure [I50.9]  02/21/2020 Yes    Elevated bilirubin [R17]  02/21/2020 Yes    Pruritus [L29.9] 01/17/2020 01/27/2020 Unknown    Essential hypertension [I10] 02/27/2019 02/03/2020 Yes      Discharged Condition: stable    Disposition: Home-Health Care Svc    Follow Up: As scheduled with LVAD clinic    Patient Instructions:      Ambulatory referral/consult to Anticoagulation Monitoring   Standing Status: Future   Referral Priority: Routine Referral Type: Consultation   Referral Reason: Specialty Services Required   Requested Specialty: Cardiology   Number of Visits Requested: 1     Diet Cardiac     Notify your health care provider if you experience any of the following:  increased confusion or weakness     Notify your health care provider if you experience any of the following:  persistent dizziness, light-headedness, or visual disturbances     Notify your health care provider if you experience any of the following:  worsening rash     Notify your health care provider if you experience any of the following:  severe persistent headache     Notify your health care provider if you experience any of the following:  difficulty breathing or increased cough     Notify your health care provider if you experience any of the following:  redness, tenderness, or signs of infection (pain, swelling, redness, odor or green/yellow discharge around  incision site)     Notify your health care provider if you experience any of the following:  persistent nausea and vomiting or diarrhea     Notify your health care provider if you experience any of the following:  temperature >100.4     Notify your health care provider if you experience any of the following:  severe uncontrolled pain     Activity as tolerated     Medications:  Reconciled Home Medications:      Medication List      START taking these medications    amLODIPine 10 MG tablet  Commonly known as:  NORVASC  Take 1 tablet (10 mg total) by mouth once daily.  Start taking on:  February 28, 2020     aspirin 325 MG tablet  Take 1 tablet (325 mg total) by mouth once daily.  Start taking on:  February 28, 2020     bumetanide 1 MG tablet  Commonly known as:  BUMEX  Take 1 tablet (1 mg total) by mouth 2 (two) times daily.     docusate sodium 100 MG capsule  Commonly known as:  COLACE  Take 1 capsule (100 mg total) by mouth 2 (two) times daily as needed for Constipation.     hydrALAZINE 100 MG tablet  Commonly known as:  APRESOLINE  Take 1 tablet (100 mg total) by mouth every 8 (eight) hours.     potassium chloride SA 20 MEQ tablet  Commonly known as:  K-DUR,KLOR-CON  Take 2 tablets (40 mEq total) by mouth once daily.     warfarin 7.5 MG tablet  Commonly known as:  COUMADIN  Take 1 tablet (7.5 mg total) by mouth Daily.        CHANGE how you take these medications    atorvastatin 20 MG tablet  Commonly known as:  LIPITOR  Take 1 tablet (20 mg total) by mouth once daily.  What changed:    · medication strength  · how much to take        CONTINUE taking these medications    albuterol 90 mcg/actuation inhaler  Commonly known as:  PROVENTIL/VENTOLIN HFA  Inhale 2 puffs into the lungs every 6 (six) hours as needed. Rescue     spironolactone 25 MG tablet  Commonly known as:  ALDACTONE  Take 25 mg by mouth once daily.        STOP taking these medications    carvediloL 3.125 MG tablet  Commonly known as:  COREG      cyclobenzaprine 10 MG tablet  Commonly known as:  FLEXERIL     digoxin 125 mcg tablet  Commonly known as:  LANOXIN     Eliquis 5 mg Tab  Generic drug:  apixaban     famotidine 20 MG tablet  Commonly known as:  PEPCID     furosemide 20 MG tablet  Commonly known as:  LASIX     oxyCODONE-acetaminophen  mg per tablet  Commonly known as:  PERCOCET     pantoprazole 40 MG tablet  Commonly known as:  PROTONIX            Shani Holliday PA-C  Heart Transplant  [facility]

## 2020-02-27 NOTE — PROGRESS NOTES
DISCHARGE NOTE:    Saba Lott is a 54 y.o. male s/p HM3 implantation this admit on 2/6/2020.     Past Medical History:   Diagnosis Date    Encounter for blood transfusion        Hospital Course: Post op course was uneventful.     Allergies:   Review of patient's allergies indicates:   Allergen Reactions    Iodine and iodide containing products        [facility] [facility]     Pharmacy Interventions/Recommendations:  1) INR Goal: 2-3    2) Antiplatelet Agents: ASA 325mg/day     3) Heparin Bridging:  yes    4) Patient Counseling/Education:  Provided     5) INR Follow-Up/Discharge Needs:  Yes     6) Patient was enrolled in the anticoagulation clinic.     See list of discharge medication for dosing instructions.     Saba Lott and his caregiver verbalized their understanding and had the opportunity to ask questions.      Discharge Medications:   Saba Lott   Home Medication Instructions ROSIE:[id number]    Printed on:02/27/20 1202   Medication Information                      albuterol (PROVENTIL/VENTOLIN HFA) 90 mcg/actuation inhaler  Inhale 2 puffs into the lungs every 6 (six) hours as needed. Rescue             amLODIPine (NORVASC) 10 MG tablet  Take 1 tablet (10 mg total) by mouth once daily.             aspirin 325 MG tablet  Take 1 tablet (325 mg total) by mouth once daily.             atorvastatin (LIPITOR) 20 MG tablet  Take 1 tablet (20 mg total) by mouth once daily.             bumetanide (BUMEX) 1 MG tablet  Take 1 tablet (1 mg total) by mouth 2 (two) times daily.             docusate sodium (COLACE) 100 MG capsule  Take 1 capsule (100 mg total) by mouth 2 (two) times daily as needed for Constipation.             hydrALAZINE (APRESOLINE) 100 MG tablet  Take 1 tablet (100 mg total) by mouth every 8 (eight) hours.             oxyCODONE (ROXICODONE) 5 MG immediate release tablet  Take 1 tablet (5 mg total) by mouth every 8 (eight) hours as needed for Pain.             potassium chloride SA  (K-DUR,KLOR-CON) 20 MEQ tablet  Take 2 tablets (40 mEq total) by mouth once daily.             spironolactone (ALDACTONE) 25 MG tablet  Take 25 mg by mouth once daily.             warfarin (COUMADIN) 7.5 MG tablet  Take 1 tablet (7.5 mg total) by mouth Daily.

## 2020-02-27 NOTE — NURSING
I assessed all incisions, pictures in chart and reviewed how to clean MSI and chest tube sites.

## 2020-02-27 NOTE — PROGRESS NOTES
Food & Nutrition  Education    Diet Education: Cardiac  Time Spent: 15 minutes  Learners: Pt and family    Nutrition Education provided with handouts: Low Sodium Nutrition Therapy    Comments: Family requested to see RD for cardiac diet education. Reviewed sodium restriction, foods high in sodium, and encouraged pt and family to flavor food with herbs/spices/salt-free seasonings instead of salt. Reviewed foods recommended/not recommended. Pt and family voiced understanding.    All questions and concerns answered. Dietitian's contact information provided.     Follow-Up: 3/4  Please Re-consult as needed  Thanks!

## 2020-02-27 NOTE — PROGRESS NOTES
DISCHARGE    SW to pt's room for discharge today.  Pt presents as sitting up in bedside chair, aao x4, calm, cooperative, and asking and answering questions appropriately.  Pt is s/p LVAD implant on 2/6.  Pt's primary caregiver trained on LVAD dressing change is pt's mother, Anna Lott (ph: [phone]).  Pt verbalizes understanding and agreement with plan for d/c to home today with HH.  Pt reports mother is on her way to hospital now and will transport pt home today.  Pt reports his parents will transport him to clinic f/u appts.  Pt reports coping adequately with support from family at this time and states he is looking forward to d/c today.  Pt denies any needs or concerns to SW at this time.  Patient verbalizes understanding of how to contact SW or LVAD team after d/c as needed.    SW notified Megha with [facility] (ph: [phone], f: [phone]) of d/c today.  [facility] faxed HH orders to [facility] yesterday and confirmed receipt of fax with Megha today.  [facility] also faxed D/c Summary to [facility] today.  Megha reports pt is scheduled to be admitted to service tomorrow.  SW providing ongoing psychosocial and counseling support, education, resources, assistance, and discharge planning as indicated.  SW remains available.

## 2020-02-28 ENCOUNTER — ANTI-COAG VISIT (OUTPATIENT)
Dept: CARDIOLOGY | Facility: CLINIC | Age: 55
End: 2020-02-28

## 2020-02-28 DIAGNOSIS — Z95.811 LVAD (LEFT VENTRICULAR ASSIST DEVICE) PRESENT: Primary | ICD-10-CM

## 2020-02-28 DIAGNOSIS — I50.42 CHRONIC COMBINED SYSTOLIC AND DIASTOLIC CONGESTIVE HEART FAILURE: Primary | ICD-10-CM

## 2020-02-28 DIAGNOSIS — I50.42 CHRONIC COMBINED SYSTOLIC AND DIASTOLIC CONGESTIVE HEART FAILURE: ICD-10-CM

## 2020-02-28 RX ORDER — SPIRONOLACTONE 25 MG/1
25 TABLET ORAL DAILY
Qty: 30 TABLET | Refills: 1 | Status: SHIPPED | OUTPATIENT
Start: 2020-02-28 | End: 2020-03-12 | Stop reason: SDUPTHER

## 2020-02-28 RX ORDER — SPIRONOLACTONE 25 MG/1
TABLET ORAL
Qty: 90 TABLET | OUTPATIENT
Start: 2020-02-28

## 2020-02-28 NOTE — PHYSICIAN QUERY
PT Name: Saba Lott  MR #: [id number]     PHYSICIAN QUERY -  ACUITY OF CONDITION CLARIFICATION      CDS/: Tisha Ferguson RN, CCDS             Contact information: [email or web address]  This form is a permanent document in the medical record.     Query Date: February 28, 2020    By submitting this query, we are merely seeking further clarification of documentation to reflect the severity of illness of your patient. Please utilize your independent clinical judgment when addressing the question(s) below.    The Medical record reflects the following:     Indicators   Supporting Clinical Findings Location in Medical Record   X Documentation of condition  Post op course was complicated by RV failure requiring  and high dose diuretics 2/27 d/c summary    Lab Value(s)      Radiology Findings     X Treatment                                 Medication Postoperatively the chest was left open due to diffuse coagulopathy in RV dysfunction.    Improvement in RV dysfunction, now moderately depressed from severe depressed RV state. 2/7 op note    Other       Provider, please specify the acuity/chronicity of _RV Failure_:    [   ] Acute   [   ] Acute and/on chronic   [   ]  Clinically Undetermined       Since this is a periop question would ask Dr. Joshi

## 2020-02-28 NOTE — PROGRESS NOTES
Saba Lott is a 54 y.o. male s/p HM3 implantation this admit on 2/6/2020, discharged 2/27/20. His PMHx includes DVT, ETOH/Tobacco abuse and NICM.  Post op course was uneventful.  Patient was discharged on warfarin 7.5 mg daily. Pt's primary caregiver trained on LVAD dressing change is pt's mother, Anna Lott (ph: [phone]).  Patient will be using [facility] (ph: [phone], f: [phone]).  Confirmed patient intake to [facility] and I have confirmed his dose with Mrs Lott, instructed her to give at 5 pm.  Coumadin calendar updated per MAR.  INR Monday as nurse reports having to drive to [place] this morning for intake and has left the patient.  Patient may be changing to  [facility], they will notify if and when.

## 2020-03-02 ENCOUNTER — TELEPHONE (OUTPATIENT)
Dept: CARDIOTHORACIC SURGERY | Facility: CLINIC | Age: 55
End: 2020-03-02

## 2020-03-02 ENCOUNTER — DOCUMENTATION ONLY (OUTPATIENT)
Dept: TRANSPLANT | Facility: CLINIC | Age: 55
End: 2020-03-02

## 2020-03-02 LAB
BNP: 610
BUN BLD-MCNC: 24 MG/DL (ref 4–21)
CALCIUM SERPL-MCNC: 9.3 MG/DL
CREAT SERPL-MCNC: 0.9 MG/DL
HCT VFR BLD AUTO: 31 % (ref 41–53)
HGB BLD-MCNC: 9.6 G/DL (ref 13.5–17.5)
INR PPP: 1.8
MAGNESIUM SERPL-MCNC: 1.7 MG/DL (ref 1.6–2.4)
POTASSIUM: 4.5
SODIUM: 130

## 2020-03-02 NOTE — PHYSICIAN QUERY
PT Name: Saba Lott  MR #: [id number]     PHYSICIAN QUERY -  ACUITY OF CONDITION CLARIFICATION      CDS/: Tisha Ferguson RN, CCDS             Contact information: [email or web address]  This form is a permanent document in the medical record.     Query Date: March 2, 2020  Query deleted to be resubmitted    By submitting this query, we are merely seeking further clarification of documentation to reflect the severity of illness of your patient. Please utilize your independent clinical judgment when addressing the question(s) below.    The Medical record reflects the following:     Indicators   Supporting Clinical Findings Location in Medical Record   X Documentation of condition  Post op course was complicated by RV failure requiring  and high dose diuretics 2/27 d/c summary    Lab Value(s)      Radiology Findings     X Treatment                                 Medication Postoperatively the chest was left open due to diffuse coagulopathy in RV dysfunction.    Improvement in RV dysfunction, now moderately depressed from severe depressed RV state. 2/7 op note    Other       Provider, please specify the acuity/chronicity of _RV Failure_:    [   ] Acute   [   ] Acute and/on chronic   [   ]  Clinically Undetermined

## 2020-03-02 NOTE — TELEPHONE ENCOUNTER
Called and informed pt of his scheduled CXR and appt with CONG Gallagher, in [facility], to assess his sternum and surgical incisions.  Pt also informed that his sutures would be removed.  Pt verbalized understanding.

## 2020-03-04 ENCOUNTER — ANTI-COAG VISIT (OUTPATIENT)
Dept: CARDIOLOGY | Facility: CLINIC | Age: 55
End: 2020-03-04
Payer: MEDICARE

## 2020-03-04 DIAGNOSIS — Z95.811 LVAD (LEFT VENTRICULAR ASSIST DEVICE) PRESENT: ICD-10-CM

## 2020-03-04 PROCEDURE — 93793 ANTICOAG MGMT PT WARFARIN: CPT | Mod: S$GLB,,,

## 2020-03-04 PROCEDURE — 93793 PR ANTICOAGULANT MGMT FOR PT TAKING WARFARIN: ICD-10-PCS | Mod: S$GLB,,,

## 2020-03-04 NOTE — PROGRESS NOTES
Verbal result taken from ___tammie______. PT/INR __20.4/1.8_____ Date drawn__3/2/2020______ Hardcopy to be faxed.  Pt questioned and confirmed dose no changes reported

## 2020-03-05 ENCOUNTER — ANTI-COAG VISIT (OUTPATIENT)
Dept: CARDIOLOGY | Facility: CLINIC | Age: 55
End: 2020-03-05
Payer: MEDICARE

## 2020-03-05 ENCOUNTER — OFFICE VISIT (OUTPATIENT)
Dept: TRANSPLANT | Facility: CLINIC | Age: 55
End: 2020-03-05
Payer: MEDICARE

## 2020-03-05 ENCOUNTER — HOSPITAL ENCOUNTER (OUTPATIENT)
Dept: RADIOLOGY | Facility: HOSPITAL | Age: 55
Discharge: HOME | End: 2020-03-05
Attending: THORACIC SURGERY (CARDIOTHORACIC VASCULAR SURGERY)
Payer: MEDICARE

## 2020-03-05 ENCOUNTER — CLINICAL SUPPORT (OUTPATIENT)
Dept: CARDIOTHORACIC SURGERY | Facility: CLINIC | Age: 55
End: 2020-03-05
Payer: MEDICARE

## 2020-03-05 ENCOUNTER — CLINICAL SUPPORT (OUTPATIENT)
Dept: TRANSPLANT | Facility: CLINIC | Age: 55
End: 2020-03-05
Payer: MEDICARE

## 2020-03-05 VITALS
SYSTOLIC BLOOD PRESSURE: 99 MMHG | TEMPERATURE: 98 F | DIASTOLIC BLOOD PRESSURE: 83 MMHG | BODY MASS INDEX: 28.11 KG/M2 | HEIGHT: 67 IN | HEART RATE: 90 BPM | WEIGHT: 179.13 LBS

## 2020-03-05 VITALS
BODY MASS INDEX: 27.62 KG/M2 | HEIGHT: 67 IN | DIASTOLIC BLOOD PRESSURE: 72 MMHG | SYSTOLIC BLOOD PRESSURE: 113 MMHG | WEIGHT: 176 LBS

## 2020-03-05 DIAGNOSIS — I42.8 NON-ISCHEMIC CARDIOMYOPATHY: ICD-10-CM

## 2020-03-05 DIAGNOSIS — Z95.811 LVAD (LEFT VENTRICULAR ASSIST DEVICE) PRESENT: ICD-10-CM

## 2020-03-05 DIAGNOSIS — Z95.811 HEART REPLACED BY HEART ASSIST DEVICE: ICD-10-CM

## 2020-03-05 DIAGNOSIS — Z95.811 LVAD (LEFT VENTRICULAR ASSIST DEVICE) PRESENT: Primary | ICD-10-CM

## 2020-03-05 DIAGNOSIS — I50.23 SYSTOLIC DYSFUNCTION WITH ACUTE ON CHRONIC HEART FAILURE: ICD-10-CM

## 2020-03-05 DIAGNOSIS — D62 ANEMIA DUE TO ACUTE BLOOD LOSS: ICD-10-CM

## 2020-03-05 PROCEDURE — 99999 PR PBB SHADOW E&M-EST. PATIENT-LVL III: ICD-10-PCS | Mod: PBBFAC,,, | Performed by: PHYSICIAN ASSISTANT

## 2020-03-05 PROCEDURE — 3074F PR MOST RECENT SYSTOLIC BLOOD PRESSURE < 130 MM HG: ICD-10-PCS | Mod: CPTII,S$GLB,, | Performed by: INTERNAL MEDICINE

## 2020-03-05 PROCEDURE — 3078F DIAST BP <80 MM HG: CPT | Mod: CPTII,S$GLB,, | Performed by: INTERNAL MEDICINE

## 2020-03-05 PROCEDURE — 99999 PR PBB SHADOW E&M-EST. PATIENT-LVL III: CPT | Mod: PBBFAC,,, | Performed by: PHYSICIAN ASSISTANT

## 2020-03-05 PROCEDURE — 99999 PR PBB SHADOW E&M-EST. PATIENT-LVL III: CPT | Mod: PBBFAC,,, | Performed by: INTERNAL MEDICINE

## 2020-03-05 PROCEDURE — 71046 X-RAY EXAM CHEST 2 VIEWS: CPT | Mod: TC,FY

## 2020-03-05 PROCEDURE — 99214 OFFICE O/P EST MOD 30 MIN: CPT | Mod: S$GLB,,, | Performed by: INTERNAL MEDICINE

## 2020-03-05 PROCEDURE — 71046 X-RAY EXAM CHEST 2 VIEWS: CPT | Mod: 26,,, | Performed by: RADIOLOGY

## 2020-03-05 PROCEDURE — 3008F PR BODY MASS INDEX (BMI) DOCUMENTED: ICD-10-PCS | Mod: CPTII,S$GLB,, | Performed by: INTERNAL MEDICINE

## 2020-03-05 PROCEDURE — 99214 PR OFFICE/OUTPT VISIT, EST, LEVL IV, 30-39 MIN: ICD-10-PCS | Mod: S$GLB,,, | Performed by: INTERNAL MEDICINE

## 2020-03-05 PROCEDURE — 3008F BODY MASS INDEX DOCD: CPT | Mod: CPTII,S$GLB,, | Performed by: INTERNAL MEDICINE

## 2020-03-05 PROCEDURE — 71046 XR CHEST PA AND LATERAL: ICD-10-PCS | Mod: 26,,, | Performed by: RADIOLOGY

## 2020-03-05 PROCEDURE — 93793 PR ANTICOAGULANT MGMT FOR PT TAKING WARFARIN: ICD-10-PCS | Mod: S$GLB,,,

## 2020-03-05 PROCEDURE — 99499 UNLISTED E&M SERVICE: CPT | Mod: S$GLB,,, | Performed by: PHYSICIAN ASSISTANT

## 2020-03-05 PROCEDURE — 93793 ANTICOAG MGMT PT WARFARIN: CPT | Mod: S$GLB,,,

## 2020-03-05 PROCEDURE — 99999 PR PBB SHADOW E&M-EST. PATIENT-LVL III: ICD-10-PCS | Mod: PBBFAC,,, | Performed by: INTERNAL MEDICINE

## 2020-03-05 PROCEDURE — 3078F PR MOST RECENT DIASTOLIC BLOOD PRESSURE < 80 MM HG: ICD-10-PCS | Mod: CPTII,S$GLB,, | Performed by: INTERNAL MEDICINE

## 2020-03-05 PROCEDURE — 99499 NO LOS: ICD-10-PCS | Mod: S$GLB,,, | Performed by: PHYSICIAN ASSISTANT

## 2020-03-05 PROCEDURE — 3074F SYST BP LT 130 MM HG: CPT | Mod: CPTII,S$GLB,, | Performed by: INTERNAL MEDICINE

## 2020-03-05 NOTE — PROGRESS NOTES
"Date of Implant with Heartmate 3 LVAD: 2/6/20    PATIENT ARRIVED IN CLINIC:  Ambulatory   Accompanied by: Caregiver    Vitals  Temperature, oral:   Temp Readings from Last 1 Encounters:   02/27/20 98.1 °F (36.7 °C) (Oral)     Blood Pressure:   BP Readings from Last 3 Encounters:   03/05/20 (!) 88/0   02/27/20 (!) 78/0   01/15/20 (!) 132/56        VAD Interrogation:  TXP CHRISTY INTERROGATIONS 3/5/2020 2/27/2020 2/27/2020   Type HeartMate3 - -   Flow 4.1 - -   Speed 5300 - -   PI 5.9 - -   Power (Centeno) 4.0 - -   LSL 4900 - -   Pulsatility - Intermittent pulse Intermittent pulse       History Log: [id number]  Problems / Issues / Alarms with VAD if any: None noted  VAD Sounds: HM3 Smooth  Heartmate 3 Module Cable:  No yellow exposed and Attempted to unscrew modular cable to ensure it will be able to come lose in the event we ever need to change the modular cable while patient held the driveline in place so it would not move. Modular cable connection able to be unscrewed and re-tightened. Instructed pt to perform this weekly.    HCT:   Lab Results   Component Value Date    HCT 35.7 (L) 03/05/2020    HCT 30 (L) 02/09/2020       Complaints/reason for visit today: routine  Emergency Equipment With Patient: yes   VAD Binder With Patient: yes   Reviewed VAD Numbers In Binder: yes    Any Equipment Issues: None noted (Refer to  note for complete details)    DLES Assessment:  Appearance Of Driveline: "1-2" healing with suture in place.   Antibiotics: NO  Velour: no  Manual & Visual Inspection Of Driveline: No kinks or tears noted  Stabilization Device In Use: yes, barton securement device      Patient MyChart Questionnaire: No flowsheet data found.     Assessment:   PAIN: NO  Complaints Of Nausea / Vomiting: None noted    Appearance and Frequency Of Stools: normal and formed without blood & daily  Color Of Urine: clear/yellow  Coping/Depression/Anxiety: coping okay  Sleep Habits: 5 hrs /night  Sleep Aids: " None noted  Showering: No  Activity/Exercise: pt/ot walking    Driving: No.    DLES Dressing Care:   Frequency of Dressing Changes: daily & daily kit  Pt In Need Of Management Kits?:yes -   2 Box of daily kit  It is medically necessary to have VAD management kits in order to prevent infection or to assist in the healing of an infected DLES.    Labs:    Chemistry        Component Value Date/Time     (L) 03/05/2020 0701     03/02/2020    K 3.9 03/05/2020 0701    K 4.5 03/02/2020    CL 98 03/05/2020 0701    CO2 25 03/05/2020 0701    BUN 12 03/05/2020 0701    BUN 24 (A) 03/02/2020    CREATININE 1.0 03/05/2020 0701    CREATININE 0.9 03/02/2020    GLU 98 03/05/2020 0701        Component Value Date/Time    CALCIUM 9.9 03/05/2020 0701    CALCIUM 9.3 03/02/2020    ALKPHOS 142 (H) 03/05/2020 0701    AST 25 03/05/2020 0701    ALT 22 03/05/2020 0701    BILITOT 0.7 03/05/2020 0701    ESTGFRAFRICA >60.0 03/05/2020 0701    EGFRNONAA >60.0 03/05/2020 0701            Magnesium   Date Value Ref Range Status   03/05/2020 1.8 1.6 - 2.6 mg/dL Final       Lab Results   Component Value Date    WBC 8.29 03/05/2020    HGB 10.5 (L) 03/05/2020    HCT 35.7 (L) 03/05/2020    MCV 88 03/05/2020     (H) 03/05/2020       Lab Results   Component Value Date    INR 2.6 (H) 03/05/2020    INR 1.8 03/02/2020    INR 2.6 (H) 02/27/2020       BNP   Date Value Ref Range Status   03/05/2020 711 (H) 0 - 99 pg/mL Final     Comment:     Values of less than 100 pg/ml are consistent with non-CHF populations.   02/26/2020 772 (H) 0 - 99 pg/mL Final     Comment:     Values of less than 100 pg/ml are consistent with non-CHF populations.   02/24/2020 515 (H) 0 - 99 pg/mL Final     Comment:     Values of less than 100 pg/ml are consistent with non-CHF populations.       LD   Date Value Ref Range Status   03/05/2020 249 110 - 260 U/L Final     Comment:     Results are increased in hemolyzed samples.   02/27/2020 282 (H) 110 - 260 U/L Final      Comment:     Results are increased in hemolyzed samples.   02/26/2020 330 (H) 110 - 260 U/L Final     Comment:     Results are increased in hemolyzed samples.       Labs reviewed with patient: YES      Patient Satisfaction Survey completed per patient: No  (explained about signature and box to check)  Medication reconciliation: per MA.  Medication Detail updated today: yes  Coumadin Managed by: [facility]    Education: Reviewed driveline care, emergency procedures, how to change the controller, alarms with patient, as well as discussed how to page the VAD coordinator in case of an emergency.     Plans/Needs: Patient presents today for first clinic visit since LVAD. Patient states he is doing ok, experiences occasional pain when coughing. He also has some SOB on exertion. Patient did not take medication this morning prior to visit so he was given water and took medicine in room. He will return after following appointments for BP check. Patient to RTC 1 week. Refer to MD note.      Repeat /72 (80). No changes per Dr. Floyd. List of local psychiatrists provided to patient by Barbara Lebron per patient request.     Hurricane Season: No

## 2020-03-05 NOTE — LETTER
March 17, 2020        Miko Catalan  [address]  [place] [address]  Phone: [phone]  Fax: [phone]             [facility] [address] [place] [address]  Phone: [phone]   Patient: Saba Lott   MR Number: [id number]   YOB: 1965   Date of Visit: 3/5/2020       Dear Dr. Miko Catalan    Thank you for referring Saba Lott to me for evaluation. Attached you will find relevant portions of my assessment and plan of care.    If you have questions, please do not hesitate to call me. I look forward to following Saba Lott along with you.    Sincerely,    Zulma Floyd MD    Enclosure    If you would like to receive this communication electronically, please contact [email or web address] or [phone] to request [employer] Link access.    [employer] Link is a tool which provides read-only access to select patient information with whom you have a relationship. Its easy to use and provides real time access to review your patients record including encounter summaries, notes, results, and demographic information.    If you feel you have received this communication in error or would no longer like to receive these types of communications, please e-mail [email or web address]

## 2020-03-05 NOTE — PROGRESS NOTES
Patient seen and examined. Patient is progressively increasing activity. No significant complaints.     Sternum: stable, incision CDI  Chest xray: Acceptable post op chest       Assessment:  S/P LVAD 2/6/2020    Plan:  Sutures removed   We will refer to cardiology to assume care   No scheduled appointment, RTC prn
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
## 2020-03-06 NOTE — PHYSICIAN QUERY
PT Name: Saba Lott  MR #: [id number]     Physician Query Form - Documentation Clarification      CDS/: Tisha Ferguson RN, CCDS             Contact information: [email or web address]    This form is a permanent document in the medical record.     Query Date: March 6, 2020    By submitting this query, we are merely seeking further clarification of documentation. Please utilize your independent clinical judgment when addressing the question(s) below.    The Medical record reflects the following:    Supporting Clinical Findings Location in Medical Record     Implantation of left ventricular assist device-HeartMate 3  Moderate to severe RV dysfunction     Postoperatively the chest was left open due to diffuse coagulopathy in RV dysfunction.    Improvement in RV dysfunction, now moderately depressed from severe depressed RV state.         2/6 op note        2/7 op note     Post op course was complicated by RV failure requiring  and high dose diuretics        2/27 d/c summary                                                                            Doctor, Please specify RV  Failure.    Provider Use Only      (  X  )  Acute RV Failure, not a complication of procedure    (    )  Acute RV Failure, complication of procedure    (    )  Other, Please specify:_________________________                                                                                                                       [  ] Clinically Undetermined

## 2020-03-11 ENCOUNTER — ANTI-COAG VISIT (OUTPATIENT)
Dept: CARDIOLOGY | Facility: CLINIC | Age: 55
End: 2020-03-11
Payer: MEDICARE

## 2020-03-11 DIAGNOSIS — Z95.811 LVAD (LEFT VENTRICULAR ASSIST DEVICE) PRESENT: ICD-10-CM

## 2020-03-11 LAB — INR PPP: 3.3

## 2020-03-11 PROCEDURE — 93793 PR ANTICOAGULANT MGMT FOR PT TAKING WARFARIN: ICD-10-PCS | Mod: S$GLB,,,

## 2020-03-11 PROCEDURE — 93793 ANTICOAG MGMT PT WARFARIN: CPT | Mod: S$GLB,,,

## 2020-03-11 NOTE — PROGRESS NOTES
Called [facility]  to get 3/09/20 PT/INR result faxed to coumadin clinic, spoke with Celine and she gave a verbal as: INR -3.3 / PT -33.8 dated 3/09/20, hard copy to be faxed

## 2020-03-12 ENCOUNTER — CLINICAL SUPPORT (OUTPATIENT)
Dept: TRANSPLANT | Facility: CLINIC | Age: 55
End: 2020-03-12
Payer: MEDICARE

## 2020-03-12 ENCOUNTER — OFFICE VISIT (OUTPATIENT)
Dept: TRANSPLANT | Facility: CLINIC | Age: 55
End: 2020-03-12
Payer: MEDICARE

## 2020-03-12 ENCOUNTER — ANTI-COAG VISIT (OUTPATIENT)
Dept: CARDIOLOGY | Facility: CLINIC | Age: 55
End: 2020-03-12
Payer: MEDICARE

## 2020-03-12 ENCOUNTER — LAB VISIT (OUTPATIENT)
Dept: LAB | Facility: HOSPITAL | Age: 55
End: 2020-03-12
Attending: INTERNAL MEDICINE
Payer: MEDICARE

## 2020-03-12 VITALS
HEART RATE: 88 BPM | TEMPERATURE: 98 F | BODY MASS INDEX: 27.47 KG/M2 | SYSTOLIC BLOOD PRESSURE: 85 MMHG | HEIGHT: 67 IN | WEIGHT: 175 LBS

## 2020-03-12 DIAGNOSIS — Z95.811 HEART REPLACED BY HEART ASSIST DEVICE: ICD-10-CM

## 2020-03-12 DIAGNOSIS — Z95.811 LVAD (LEFT VENTRICULAR ASSIST DEVICE) PRESENT: ICD-10-CM

## 2020-03-12 DIAGNOSIS — Z95.810 AICD (AUTOMATIC CARDIOVERTER/DEFIBRILLATOR) PRESENT: ICD-10-CM

## 2020-03-12 DIAGNOSIS — I50.42 CHRONIC COMBINED SYSTOLIC AND DIASTOLIC CONGESTIVE HEART FAILURE: ICD-10-CM

## 2020-03-12 DIAGNOSIS — Z95.811 LVAD (LEFT VENTRICULAR ASSIST DEVICE) PRESENT: Primary | ICD-10-CM

## 2020-03-12 DIAGNOSIS — I42.8 NON-ISCHEMIC CARDIOMYOPATHY: ICD-10-CM

## 2020-03-12 DIAGNOSIS — I50.23 SYSTOLIC DYSFUNCTION WITH ACUTE ON CHRONIC HEART FAILURE: ICD-10-CM

## 2020-03-12 LAB
ALBUMIN SERPL BCP-MCNC: 3.9 G/DL (ref 3.5–5.2)
ALP SERPL-CCNC: 149 U/L (ref 55–135)
ALT SERPL W/O P-5'-P-CCNC: 20 U/L (ref 10–44)
ANION GAP SERPL CALC-SCNC: 13 MMOL/L (ref 8–16)
AST SERPL-CCNC: 26 U/L (ref 10–40)
BASOPHILS # BLD AUTO: 0.06 K/UL (ref 0–0.2)
BASOPHILS NFR BLD: 0.7 % (ref 0–1.9)
BILIRUB DIRECT SERPL-MCNC: 0.4 MG/DL (ref 0.1–0.3)
BILIRUB SERPL-MCNC: 0.7 MG/DL (ref 0.1–1)
BNP SERPL-MCNC: 434 PG/ML (ref 0–99)
BUN SERPL-MCNC: 10 MG/DL (ref 6–20)
CALCIUM SERPL-MCNC: 10 MG/DL (ref 8.7–10.5)
CHLORIDE SERPL-SCNC: 97 MMOL/L (ref 95–110)
CO2 SERPL-SCNC: 24 MMOL/L (ref 23–29)
CREAT SERPL-MCNC: 0.9 MG/DL (ref 0.5–1.4)
CRP SERPL-MCNC: 11.4 MG/L (ref 0–8.2)
DIFFERENTIAL METHOD: ABNORMAL
EOSINOPHIL # BLD AUTO: 0.3 K/UL (ref 0–0.5)
EOSINOPHIL NFR BLD: 3.4 % (ref 0–8)
ERYTHROCYTE [DISTWIDTH] IN BLOOD BY AUTOMATED COUNT: 20.8 % (ref 11.5–14.5)
EST. GFR  (AFRICAN AMERICAN): >60 ML/MIN/1.73 M^2
EST. GFR  (NON AFRICAN AMERICAN): >60 ML/MIN/1.73 M^2
GLUCOSE SERPL-MCNC: 98 MG/DL (ref 70–110)
HCT VFR BLD AUTO: 40.8 % (ref 40–54)
HGB BLD-MCNC: 12.1 G/DL (ref 14–18)
IMM GRANULOCYTES # BLD AUTO: 0.03 K/UL (ref 0–0.04)
IMM GRANULOCYTES NFR BLD AUTO: 0.3 % (ref 0–0.5)
INR PPP: 3.6 (ref 0.8–1.2)
LDH SERPL L TO P-CCNC: 422 U/L (ref 110–260)
LYMPHOCYTES # BLD AUTO: 1.6 K/UL (ref 1–4.8)
LYMPHOCYTES NFR BLD: 18.5 % (ref 18–48)
MAGNESIUM SERPL-MCNC: 1.8 MG/DL (ref 1.6–2.6)
MCH RBC QN AUTO: 25.8 PG (ref 27–31)
MCHC RBC AUTO-ENTMCNC: 29.7 G/DL (ref 32–36)
MCV RBC AUTO: 87 FL (ref 82–98)
MONOCYTES # BLD AUTO: 0.4 K/UL (ref 0.3–1)
MONOCYTES NFR BLD: 4.5 % (ref 4–15)
NEUTROPHILS # BLD AUTO: 6.3 K/UL (ref 1.8–7.7)
NEUTROPHILS NFR BLD: 72.6 % (ref 38–73)
NRBC BLD-RTO: 0 /100 WBC
PHOSPHATE SERPL-MCNC: 3.6 MG/DL (ref 2.7–4.5)
PLATELET # BLD AUTO: 372 K/UL (ref 150–350)
PMV BLD AUTO: 8.8 FL (ref 9.2–12.9)
POTASSIUM SERPL-SCNC: 3.8 MMOL/L (ref 3.5–5.1)
PREALB SERPL-MCNC: 24 MG/DL (ref 20–43)
PROT SERPL-MCNC: 9.1 G/DL (ref 6–8.4)
PROTHROMBIN TIME: 34.5 SEC (ref 9–12.5)
RBC # BLD AUTO: 4.69 M/UL (ref 4.6–6.2)
SODIUM SERPL-SCNC: 134 MMOL/L (ref 136–145)
WBC # BLD AUTO: 8.65 K/UL (ref 3.9–12.7)

## 2020-03-12 PROCEDURE — 85025 COMPLETE CBC W/AUTO DIFF WBC: CPT

## 2020-03-12 PROCEDURE — 86140 C-REACTIVE PROTEIN: CPT

## 2020-03-12 PROCEDURE — 3074F SYST BP LT 130 MM HG: CPT | Mod: CPTII,S$GLB,, | Performed by: INTERNAL MEDICINE

## 2020-03-12 PROCEDURE — 93793 ANTICOAG MGMT PT WARFARIN: CPT | Mod: S$GLB,,,

## 2020-03-12 PROCEDURE — 82248 BILIRUBIN DIRECT: CPT

## 2020-03-12 PROCEDURE — 99214 OFFICE O/P EST MOD 30 MIN: CPT | Mod: S$GLB,,, | Performed by: INTERNAL MEDICINE

## 2020-03-12 PROCEDURE — 93750 OP LVAD INTERROGATION: ICD-10-PCS | Mod: S$GLB,,, | Performed by: INTERNAL MEDICINE

## 2020-03-12 PROCEDURE — 85610 PROTHROMBIN TIME: CPT

## 2020-03-12 PROCEDURE — 84134 ASSAY OF PREALBUMIN: CPT

## 2020-03-12 PROCEDURE — 3008F BODY MASS INDEX DOCD: CPT | Mod: CPTII,S$GLB,, | Performed by: INTERNAL MEDICINE

## 2020-03-12 PROCEDURE — 3078F DIAST BP <80 MM HG: CPT | Mod: CPTII,S$GLB,, | Performed by: INTERNAL MEDICINE

## 2020-03-12 PROCEDURE — 36415 COLL VENOUS BLD VENIPUNCTURE: CPT

## 2020-03-12 PROCEDURE — 99999 PR PBB SHADOW E&M-EST. PATIENT-LVL IV: ICD-10-PCS | Mod: PBBFAC,,, | Performed by: INTERNAL MEDICINE

## 2020-03-12 PROCEDURE — 83615 LACTATE (LD) (LDH) ENZYME: CPT

## 2020-03-12 PROCEDURE — 93750 INTERROGATION VAD IN PERSON: CPT | Mod: S$GLB,,, | Performed by: INTERNAL MEDICINE

## 2020-03-12 PROCEDURE — 3008F PR BODY MASS INDEX (BMI) DOCUMENTED: ICD-10-PCS | Mod: CPTII,S$GLB,, | Performed by: INTERNAL MEDICINE

## 2020-03-12 PROCEDURE — 3074F PR MOST RECENT SYSTOLIC BLOOD PRESSURE < 130 MM HG: ICD-10-PCS | Mod: CPTII,S$GLB,, | Performed by: INTERNAL MEDICINE

## 2020-03-12 PROCEDURE — 99999 PR PBB SHADOW E&M-EST. PATIENT-LVL IV: CPT | Mod: PBBFAC,,, | Performed by: INTERNAL MEDICINE

## 2020-03-12 PROCEDURE — 99214 PR OFFICE/OUTPT VISIT, EST, LEVL IV, 30-39 MIN: ICD-10-PCS | Mod: S$GLB,,, | Performed by: INTERNAL MEDICINE

## 2020-03-12 PROCEDURE — 84100 ASSAY OF PHOSPHORUS: CPT

## 2020-03-12 PROCEDURE — 3078F PR MOST RECENT DIASTOLIC BLOOD PRESSURE < 80 MM HG: ICD-10-PCS | Mod: CPTII,S$GLB,, | Performed by: INTERNAL MEDICINE

## 2020-03-12 PROCEDURE — 93793 PR ANTICOAGULANT MGMT FOR PT TAKING WARFARIN: ICD-10-PCS | Mod: S$GLB,,,

## 2020-03-12 PROCEDURE — 80053 COMPREHEN METABOLIC PANEL: CPT

## 2020-03-12 PROCEDURE — 83735 ASSAY OF MAGNESIUM: CPT

## 2020-03-12 PROCEDURE — 83880 ASSAY OF NATRIURETIC PEPTIDE: CPT

## 2020-03-12 RX ORDER — HYDRALAZINE HYDROCHLORIDE 100 MG/1
100 TABLET, FILM COATED ORAL EVERY 8 HOURS
Qty: 90 TABLET | Refills: 11 | Status: SHIPPED | OUTPATIENT
Start: 2020-03-12 | End: 2020-05-25 | Stop reason: SDUPTHER

## 2020-03-12 RX ORDER — DOCUSATE SODIUM 100 MG/1
100 CAPSULE, LIQUID FILLED ORAL 2 TIMES DAILY PRN
Refills: 0 | COMMUNITY
Start: 2020-03-12 | End: 2020-05-25 | Stop reason: SDUPTHER

## 2020-03-12 RX ORDER — BUMETANIDE 1 MG/1
1 TABLET ORAL 2 TIMES DAILY
Qty: 60 TABLET | Refills: 11 | Status: SHIPPED | OUTPATIENT
Start: 2020-03-12 | End: 2020-05-25 | Stop reason: SDUPTHER

## 2020-03-12 RX ORDER — WARFARIN 7.5 MG/1
7.5 TABLET ORAL DAILY
Qty: 30 TABLET | Refills: 11 | Status: SHIPPED | OUTPATIENT
Start: 2020-03-12 | End: 2020-05-25 | Stop reason: SDUPTHER

## 2020-03-12 RX ORDER — POTASSIUM CHLORIDE 20 MEQ/1
20 TABLET, EXTENDED RELEASE ORAL DAILY
Qty: 60 TABLET | Refills: 1 | Status: SHIPPED | OUTPATIENT
Start: 2020-03-12 | End: 2020-10-15 | Stop reason: SDUPTHER

## 2020-03-12 RX ORDER — ASPIRIN 325 MG
325 TABLET ORAL DAILY
Qty: 30 TABLET | Refills: 11 | Status: SHIPPED | OUTPATIENT
Start: 2020-03-12 | End: 2020-05-25 | Stop reason: SDUPTHER

## 2020-03-12 RX ORDER — AMLODIPINE BESYLATE 10 MG/1
10 TABLET ORAL DAILY
Qty: 30 TABLET | Refills: 11 | Status: SHIPPED | OUTPATIENT
Start: 2020-03-12 | End: 2020-05-25 | Stop reason: SDUPTHER

## 2020-03-12 RX ORDER — LISINOPRIL 5 MG/1
5 TABLET ORAL DAILY
Qty: 90 TABLET | Refills: 3 | Status: SHIPPED | OUTPATIENT
Start: 2020-03-12 | End: 2020-05-25 | Stop reason: SDUPTHER

## 2020-03-12 RX ORDER — POTASSIUM CHLORIDE 20 MEQ/1
40 TABLET, EXTENDED RELEASE ORAL DAILY
Qty: 60 TABLET | Refills: 1 | Status: SHIPPED | OUTPATIENT
Start: 2020-03-12 | End: 2020-03-12

## 2020-03-12 RX ORDER — SPIRONOLACTONE 25 MG/1
25 TABLET ORAL DAILY
Qty: 30 TABLET | Refills: 1 | Status: SHIPPED | OUTPATIENT
Start: 2020-03-12 | End: 2020-05-25 | Stop reason: SDUPTHER

## 2020-03-12 RX ORDER — ATORVASTATIN CALCIUM 20 MG/1
20 TABLET, FILM COATED ORAL DAILY
Qty: 30 TABLET | Refills: 11 | Status: SHIPPED | OUTPATIENT
Start: 2020-03-12 | End: 2020-05-25 | Stop reason: SDUPTHER

## 2020-03-12 NOTE — LETTER
March 17, 2020        Miko Catalan  [address]  [place] [address]  Phone: [phone]  Fax: [phone]             [facility] [address] [place] [address]  Phone: [phone]   Patient: Saba Lott   MR Number: [id number]   YOB: 1965   Date of Visit: 3/12/2020       Dear Dr. Miko Catalan    Thank you for referring Saba Lott to me for evaluation. Attached you will find relevant portions of my assessment and plan of care.    If you have questions, please do not hesitate to call me. I look forward to following Saba Lott along with you.    Sincerely,    Zulma Floyd MD    Enclosure    If you would like to receive this communication electronically, please contact [email or web address] or [phone] to request [employer] Link access.    [employer] Link is a tool which provides read-only access to select patient information with whom you have a relationship. Its easy to use and provides real time access to review your patients record including encounter summaries, notes, results, and demographic information.    If you feel you have received this communication in error or would no longer like to receive these types of communications, please e-mail [email or web address]

## 2020-03-12 NOTE — PROGRESS NOTES
Seen pt in clinic. I was notified pt had no power alarm, after further investigation pt was changing batteries out and removed both batteries at the same time. Educated pt to always use a fully charged set of batteries in the am and to change one battery at a time. Pt and caregiver agreed, will follow up at next visit.

## 2020-03-12 NOTE — PROGRESS NOTES
"Date of Implant with Heartmate 3 LVAD: 20    PATIENT ARRIVED IN CLINIC:  Ambulatory   Accompanied by: Caregiver    Vitals  Temperature, oral:   Temp Readings from Last 1 Encounters:   20 98.1 °F (36.7 °C) (Oral)     Blood Pressure:   BP Readings from Last 3 Encounters:   20 (!) 85/0   20 99/83   20 113/72        VAD Interrogation:  TXP CHRISTY INTERROGATIONS 3/12/2020 3/5/2020 2020   Type HeartMate3 HeartMate3 -   Flow 4.2 4.1 -   Speed 5300 5300 -   PI 5.1 5.9 -   Power (Centeno) 4.0 4.0 -   LSL 4900 4900 -   Pulsatility No Pulse - Intermittent pulse       History Lo.c3e  Problems / Issues / Alarms with VAD if any: None noted  VAD Sounds: HM3 Smooth  Heartmate 3 Module Cable:  No yellow exposed and Attempted to unscrew modular cable to ensure it will be able to come lose in the event we ever need to change the modular cable while patient held the driveline in place so it would not move. Modular cable connection able to be unscrewed and re-tightened. Instructed pt to perform this weekly.    HCT:   Lab Results   Component Value Date    HCT 40.8 2020    HCT 30 (L) 2020       Complaints/reason for visit today: routine  Emergency Equipment With Patient: yes   VAD Binder With Patient: yes   Reviewed VAD Numbers In Binder: yes    Any Equipment Issues: None noted (Refer to  note for complete details)    DLES Assessment:  Appearance Of Driveline: "1" with suture, will leave suture in place as INR is 3.6  Antibiotics: NO  Velour: no  Manual & Visual Inspection Of Driveline: No kinks or tears noted  Stabilization Device In Use: yes, barton securement device      Patient MyChart Questionnaire: No flowsheet data found.     Assessment:   PAIN: NO  Complaints Of Nausea / Vomiting: None noted    Appearance and Frequency Of Stools: normal and formed without blood & daily  Color Of Urine: clear/yellow  Coping/Depression/Anxiety: coping okay  Sleep Habits: 5 hrs " /night  Sleep Aids: None noted  Showering: No  Activity/Exercise: PT   Driving: No.    DLES Dressing Care:   Frequency of Dressing Changes: daily & daily kit  Pt In Need Of Management Kits?:no   It is medically necessary to have VAD management kits in order to prevent infection or to assist in the healing of an infected DLES.    Labs:    Chemistry        Component Value Date/Time     (L) 03/12/2020 1200     03/02/2020    K 3.8 03/12/2020 1200    K 4.5 03/02/2020    CL 97 03/12/2020 1200    CO2 24 03/12/2020 1200    BUN 10 03/12/2020 1200    BUN 24 (A) 03/02/2020    CREATININE 0.9 03/12/2020 1200    CREATININE 0.9 03/02/2020    GLU 98 03/12/2020 1200        Component Value Date/Time    CALCIUM 10.0 03/12/2020 1200    CALCIUM 9.3 03/02/2020    ALKPHOS 149 (H) 03/12/2020 1200    AST 26 03/12/2020 1200    ALT 20 03/12/2020 1200    BILITOT 0.7 03/12/2020 1200    ESTGFRAFRICA >60.0 03/12/2020 1200    EGFRNONAA >60.0 03/12/2020 1200            Magnesium   Date Value Ref Range Status   03/12/2020 1.8 1.6 - 2.6 mg/dL Final       Lab Results   Component Value Date    WBC 8.65 03/12/2020    HGB 12.1 (L) 03/12/2020    HCT 40.8 03/12/2020    MCV 87 03/12/2020     (H) 03/12/2020       Lab Results   Component Value Date    INR 3.6 (H) 03/12/2020    INR 3.3 03/09/2020    INR 2.6 (H) 03/05/2020       BNP   Date Value Ref Range Status   03/12/2020 434 (H) 0 - 99 pg/mL Final     Comment:     Values of less than 100 pg/ml are consistent with non-CHF populations.   03/05/2020 711 (H) 0 - 99 pg/mL Final     Comment:     Values of less than 100 pg/ml are consistent with non-CHF populations.   02/26/2020 772 (H) 0 - 99 pg/mL Final     Comment:     Values of less than 100 pg/ml are consistent with non-CHF populations.       LD   Date Value Ref Range Status   03/12/2020 422 (H) 110 - 260 U/L Final     Comment:     Results are increased in hemolyzed samples.  *Result may be interfered by visible hemolysis     03/05/2020 249  110 - 260 U/L Final     Comment:     Results are increased in hemolyzed samples.   02/27/2020 282 (H) 110 - 260 U/L Final     Comment:     Results are increased in hemolyzed samples.       Labs reviewed with patient: YES      Patient Satisfaction Survey completed per patient: No  (explained about signature and box to check)  Medication reconciliation: per MA.  Medication Detail updated today: yes  Coumadin Managed by: [facility]    Education: Reviewed driveline care, emergency procedures, how to change the controller, alarms with patient, as well as discussed how to page the VAD coordinator in case of an emergency.     Plans/Needs: Patient presents today for routine followup. Patient states he is doing well without issue. BP elevated today as well as LDH. Per Dr. Floyd, patient will be started on lisinopril 5 mg daily and he is to decrease potassium to 20 mEq daily. Patient going to lab to have LDH rechecked before he leaves. He is aware that if it is greatly elevated, he will be called to return to [facility]. Patient will RTC 1 week for visit. Refer to MD note.     Hurricane Season: No

## 2020-03-12 NOTE — PATIENT INSTRUCTIONS
Start lisinopril 5mg once daily.     Decrease potassium to 20 mEQ (one tablet only) once daily.     Repeat labs next week to follow up on kidney function.

## 2020-03-17 PROBLEM — N17.9 AKI (ACUTE KIDNEY INJURY): Status: RESOLVED | Noted: 2020-01-15 | Resolved: 2020-03-17

## 2020-03-18 NOTE — PROCEDURES
TXP CHRISTY INTERROGATIONS 3/12/2020 3/5/2020 2/27/2020 2/27/2020 2/26/2020 2/26/2020 2/26/2020   Type HeartMate3 HeartMate3 - - - - -   Flow 4.2 4.1 - - - - -   Speed 5300 5300 - - - - -   PI 5.1 5.9 - - - - -   Power (Centeno) 4.0 4.0 - - - - -   LSL 4900 4900 - - - - -   Pulsatility No Pulse - Intermittent pulse Intermittent pulse Intermittent pulse Intermittent pulse Intermittent pulse   }

## 2020-03-18 NOTE — PROGRESS NOTES
Subjective:   Patient ID:  Saba Lott is a 54 y.o. male who presents for LVAD followup visit.    Implant date: 02/06/20  Heartmate 3 RPM 5300     INR goal: 2-3   Bridge with Heparin   Antiplatelets:    TXP CHRISTY INTERROGATIONS 3/12/2020   Type HeartMate3   Flow 4.2   Speed 5300   PI 5.1   Power (Centeno) 4.0   LSL 4900   Pulsatility No Pulse       HPI  Mr. Lott is a 53 year old AAM with DCM, HTN, tobacco and ETOH abuse (quit 2018), who presented to the hospital in January with ADHF/cardiogenic shock, was satrted on CRRT, had IABP placed, recovered from this from renal standpoint and labs, and proceeded with LVAD impalnt 02/06. Post op course was complicated by some RV failure requiring dobutamine, high dose diuretics, but was eventually weaned off successfully, with PICC and  weaned off as well. Patient had some post op thrombocytosis however heme onc consulted and felt it was reactive.     Today is his first clinic visit, mom is doing dressing changes, and he seems to be doing very well. Feels good, better than he expected and he and mom feel the dressing changes are going very well as well. Patient denies N/V/F/C, lightheadedness, dizziness, PND, orthopnea, LE edema, abdominal pain, abdominal pressure, chest pain, chest pressure, syncope, pre-syncope. Additionally patient has no bleeding, no bright red blood per rectum, melena, no GI symptoms at all. NYHA FC II.     Review of Systems   Constitution: Positive for weight gain. Negative for chills, decreased appetite, diaphoresis, fever, malaise/fatigue and weight loss.   HENT: Negative for congestion.    Eyes: Negative for blurred vision and visual disturbance.   Cardiovascular: Negative for chest pain, dyspnea on exertion, irregular heartbeat, leg swelling, near-syncope, orthopnea, palpitations, paroxysmal nocturnal dyspnea and syncope.   Respiratory: Negative for cough, shortness of breath, sleep disturbances due to breathing, snoring and wheezing.   "  Hematologic/Lymphatic: Negative for bleeding problem. Does not bruise/bleed easily.   Skin: Negative for poor wound healing and rash.   Musculoskeletal: Negative for arthritis, joint pain and muscle weakness.   Gastrointestinal: Negative for bloating, abdominal pain, anorexia, constipation, diarrhea, hematemesis, hematochezia, melena, nausea and vomiting.   Genitourinary: Negative for frequency and urgency.   Neurological: Negative for difficulty with concentration, excessive daytime sleepiness, dizziness, headaches, light-headedness and weakness.   Psychiatric/Behavioral: Negative for depression. The patient does not have insomnia and is not nervous/anxious.        Objective:   Blood pressure 113/72, height 5' 7" (1.702 m), weight 79.8 kg (176 lb).body mass index is 27.57 kg/m².    Doppler: 88    Physical Exam   Constitutional: He is oriented to person, place, and time. He appears well-developed and well-nourished. No distress.   HENT:   Head: Normocephalic and atraumatic.   Nose: Nose normal.   Mouth/Throat: Oropharynx is clear and moist. No oropharyngeal exudate.   Eyes: Pupils are equal, round, and reactive to light. Conjunctivae and EOM are normal. No scleral icterus.   Neck: Normal range of motion. Neck supple. No JVD present. No tracheal deviation present. No thyromegaly present.   Cardiovascular: Normal rate, regular rhythm, S1 normal, S2 normal and normal heart sounds. Exam reveals no gallop and no friction rub.   No murmur heard.  vad hum throughout precordium. DLES 1   Pulmonary/Chest: Effort normal and breath sounds normal. No respiratory distress. He has no wheezes. He has no rales. He exhibits no tenderness.   Abdominal: Soft. Bowel sounds are normal. He exhibits no distension and no mass. There is no tenderness. There is no rebound and no guarding.   Musculoskeletal: Normal range of motion. He exhibits no edema or tenderness.   Neurological: He is alert and oriented to person, place, and time. " Coordination normal.   Skin: Skin is warm and dry. No rash noted. He is not diaphoretic. No erythema. No pallor.   Psychiatric: He has a normal mood and affect. His behavior is normal. Judgment and thought content normal.   Nursing note and vitals reviewed.      Lab Results   Component Value Date    WBC 8.65 03/12/2020    HGB 12.1 (L) 03/12/2020    HCT 40.8 03/12/2020    MCV 87 03/12/2020     (H) 03/12/2020    CO2 24 03/12/2020    CREATININE 0.9 03/12/2020    CALCIUM 10.0 03/12/2020    ALBUMIN 3.9 03/12/2020    AST 26 03/12/2020     (H) 03/12/2020    ALT 20 03/12/2020     03/12/2020       Lab Results   Component Value Date    INR 3.6 (H) 03/12/2020    INR 3.3 03/09/2020    INR 2.6 (H) 03/05/2020       BNP   Date Value Ref Range Status   03/12/2020 434 (H) 0 - 99 pg/mL Final     Comment:     Values of less than 100 pg/ml are consistent with non-CHF populations.   03/05/2020 711 (H) 0 - 99 pg/mL Final     Comment:     Values of less than 100 pg/ml are consistent with non-CHF populations.   02/26/2020 772 (H) 0 - 99 pg/mL Final     Comment:     Values of less than 100 pg/ml are consistent with non-CHF populations.       LD   Date Value Ref Range Status   03/12/2020 245 110 - 260 U/L Final     Comment:     Results are increased in hemolyzed samples.   03/12/2020 422 (H) 110 - 260 U/L Final     Comment:     Results are increased in hemolyzed samples.  *Result may be interfered by visible hemolysis     03/05/2020 249 110 - 260 U/L Final     Comment:     Results are increased in hemolyzed samples.     Labs were reviewed with the patient.    Assessment:      1. LVAD (left ventricular assist device) present    2. Heart replaced by heart assist device    3. Non-ischemic cardiomyopathy    4. Systolic dysfunction with acute on chronic heart failure    5. Anemia due to acute blood loss        Plan:   Patient is doing remarkably well from MCS standpoint since discharge. Very happy with his progress. Walking  and moving more, going to start  exercising in a more organized fashion. Initial BP was elevated however repeat after taking all his meds was within range of normal. No changes today, will see how he looks next week.   Patient is now NYHA II  Recommend 2 gram sodium restriction and 1500cc fluid restriction.  Encourage physical activity with graded exercise program.  Requested patient to weigh themselves daily, and to notify us if their weight increases by more than 3 lbs in 1 day or 5 lbs in 1 week.     Listed for transplant: No, implanted as a DT VAD.

## 2020-03-18 NOTE — PROGRESS NOTES
Subjective:   Patient ID:  Saba Lott is a 54 y.o. male who presents for LVAD followup visit.    Implant date: 02/06/20  Heartmate 3 RPM 5300     INR goal: 2-3   Bridge with Heparin   Antiplatelets:    TXP CHRISTY INTERROGATIONS 3/12/2020   Type HeartMate3   Flow 4.2   Speed 5300   PI 5.1   Power (Centeno) 4.0   LSL 4900   Pulsatility No Pulse       HPI  Mr. Lott is a 53 year old AAM with DCM, HTN, tobacco and ETOH abuse (quit 2018), who presented to the hospital in January with ADHF/cardiogenic shock, was satrted on CRRT, had IABP placed, recovered from this from renal standpoint and labs, and proceeded with LVAD impalnt 02/06. Post op course was complicated by some RV failure requiring dobutamine, high dose diuretics, but was eventually weaned off successfully, with PICC and  weaned off as well. Patient had some post op thrombocytosis however heme onc consulted and felt it was reactive.     Today is his second visit with us. Last week took his meds late, right before coming into the clinic appointment. Today took his meds earlier ,feels well overall. Patient denies N/V/F/C, lightheadedness, dizziness, PND, orthopnea, LE edema, abdominal pain, abdominal pressure, chest pain, chest pressure, syncope, pre-syncope. Additionally patient has no bleeding, no bright red blood per rectum, melena, no GI symptoms at all.  Last visit did ask to get help regarding psychiatry/therapy visit for depression symptoms, this week and he and mom are going to follow up, however mom admits/sees that he is feeling good and feeling better from psychiatric standpoint because he is feeling better physically, but they would like to follow up with someone still. NYHA FC II. Impressed with his progress. One note, LDH is elevated however feel it is due to sitting for so long.     Review of Systems   Constitution: Positive for weight gain. Negative for chills, decreased appetite, diaphoresis, fever, malaise/fatigue and weight loss.  "  HENT: Negative for congestion.    Eyes: Negative for blurred vision and visual disturbance.   Cardiovascular: Negative for chest pain, dyspnea on exertion, irregular heartbeat, leg swelling, near-syncope, orthopnea, palpitations, paroxysmal nocturnal dyspnea and syncope.   Respiratory: Negative for cough, shortness of breath, sleep disturbances due to breathing, snoring and wheezing.    Hematologic/Lymphatic: Negative for bleeding problem. Does not bruise/bleed easily.   Skin: Negative for poor wound healing and rash.   Musculoskeletal: Negative for arthritis, joint pain and muscle weakness.   Gastrointestinal: Negative for bloating, abdominal pain, anorexia, constipation, diarrhea, hematemesis, hematochezia, melena, nausea and vomiting.   Genitourinary: Negative for frequency and urgency.   Neurological: Negative for difficulty with concentration, excessive daytime sleepiness, dizziness, headaches, light-headedness and weakness.   Psychiatric/Behavioral: Negative for depression. The patient does not have insomnia and is not nervous/anxious.        Objective:   Blood pressure (!) 85/0, pulse 88, temperature 98.1 °F (36.7 °C), temperature source Oral, height 5' 7" (1.702 m), weight 79.4 kg (175 lb).body mass index is 27.41 kg/m².    Physical Exam   Constitutional: He is oriented to person, place, and time. He appears well-developed and well-nourished. No distress.   HENT:   Head: Normocephalic and atraumatic.   Nose: Nose normal.   Mouth/Throat: Oropharynx is clear and moist. No oropharyngeal exudate.   Eyes: Pupils are equal, round, and reactive to light. Conjunctivae and EOM are normal. No scleral icterus.   Neck: Normal range of motion. Neck supple. No JVD present. No tracheal deviation present. No thyromegaly present.   Cardiovascular: Normal rate, regular rhythm, S1 normal, S2 normal and normal heart sounds. Exam reveals no gallop and no friction rub.   No murmur heard.  vad hum throughout precordium. DLES 1 "   Pulmonary/Chest: Effort normal and breath sounds normal. No respiratory distress. He has no wheezes. He has no rales. He exhibits no tenderness.   Abdominal: Soft. Bowel sounds are normal. He exhibits no distension and no mass. There is no tenderness. There is no rebound and no guarding.   Musculoskeletal: Normal range of motion. He exhibits no edema or tenderness.   Neurological: He is alert and oriented to person, place, and time. Coordination normal.   Skin: Skin is warm and dry. No rash noted. He is not diaphoretic. No erythema. No pallor.   Psychiatric: He has a normal mood and affect. His behavior is normal. Judgment and thought content normal.   Nursing note and vitals reviewed.      Lab Results   Component Value Date    WBC 8.65 03/12/2020    HGB 12.1 (L) 03/12/2020    HCT 40.8 03/12/2020    MCV 87 03/12/2020     (H) 03/12/2020    CO2 24 03/12/2020    CREATININE 0.9 03/12/2020    CALCIUM 10.0 03/12/2020    ALBUMIN 3.9 03/12/2020    AST 26 03/12/2020     (H) 03/12/2020    ALT 20 03/12/2020     03/12/2020       Lab Results   Component Value Date    INR 3.6 (H) 03/12/2020    INR 3.3 03/09/2020    INR 2.6 (H) 03/05/2020       BNP   Date Value Ref Range Status   03/12/2020 434 (H) 0 - 99 pg/mL Final     Comment:     Values of less than 100 pg/ml are consistent with non-CHF populations.   03/05/2020 711 (H) 0 - 99 pg/mL Final     Comment:     Values of less than 100 pg/ml are consistent with non-CHF populations.   02/26/2020 772 (H) 0 - 99 pg/mL Final     Comment:     Values of less than 100 pg/ml are consistent with non-CHF populations.       LD   Date Value Ref Range Status   03/12/2020 245 110 - 260 U/L Final     Comment:     Results are increased in hemolyzed samples.   03/12/2020 422 (H) 110 - 260 U/L Final     Comment:     Results are increased in hemolyzed samples.  *Result may be interfered by visible hemolysis     03/05/2020 249 110 - 260 U/L Final     Comment:     Results are  increased in hemolyzed samples.     Labs were reviewed with the patient.    Assessment:      1. LVAD (left ventricular assist device) present    2. Heart replaced by heart assist device    3. Chronic combined systolic and diastolic congestive heart failure    4. Non-ischemic cardiomyopathy    5. Systolic dysfunction with acute on chronic heart failure    6. AICD (automatic cardioverter/defibrillator) present        Plan:   Will go ahead and start lisinopril 5mg po daily today. Decrease potassium to 20mEQ daily.   Repeat LDH. Although likely normal, patient aware he will have to return if elevated.    Repeat labs in 1 week to follow up on renal function.   Would like to reduce amlodipine and uptitrate lisinopril based on labs/how he is doing when he returns.   Patient is now NYHA II  Recommend 2 gram sodium restriction and 1500cc fluid restriction.  Encourage physical activity with graded exercise program.  Requested patient to weigh themselves daily, and to notify us if their weight increases by more than 3 lbs in 1 day or 5 lbs in 1 week.     Listed for transplant: No, implanted as a DT VAD.

## 2020-03-19 ENCOUNTER — CLINICAL SUPPORT (OUTPATIENT)
Dept: TRANSPLANT | Facility: CLINIC | Age: 55
End: 2020-03-19
Payer: MEDICARE

## 2020-03-19 ENCOUNTER — OFFICE VISIT (OUTPATIENT)
Dept: TRANSPLANT | Facility: CLINIC | Age: 55
End: 2020-03-19
Payer: MEDICARE

## 2020-03-19 ENCOUNTER — ANTI-COAG VISIT (OUTPATIENT)
Dept: CARDIOLOGY | Facility: CLINIC | Age: 55
End: 2020-03-19
Payer: MEDICAID

## 2020-03-19 ENCOUNTER — LAB VISIT (OUTPATIENT)
Dept: LAB | Facility: HOSPITAL | Age: 55
End: 2020-03-19
Attending: INTERNAL MEDICINE
Payer: MEDICARE

## 2020-03-19 VITALS — SYSTOLIC BLOOD PRESSURE: 80 MMHG | BODY MASS INDEX: 27 KG/M2 | WEIGHT: 172 LBS | TEMPERATURE: 98 F | HEIGHT: 67 IN

## 2020-03-19 DIAGNOSIS — Z95.810 AICD (AUTOMATIC CARDIOVERTER/DEFIBRILLATOR) PRESENT: ICD-10-CM

## 2020-03-19 DIAGNOSIS — I42.8 NON-ISCHEMIC CARDIOMYOPATHY: ICD-10-CM

## 2020-03-19 DIAGNOSIS — Z95.811 LVAD (LEFT VENTRICULAR ASSIST DEVICE) PRESENT: ICD-10-CM

## 2020-03-19 DIAGNOSIS — Z95.811 HEART REPLACED BY HEART ASSIST DEVICE: Primary | ICD-10-CM

## 2020-03-19 DIAGNOSIS — Z95.811 HEART REPLACED BY HEART ASSIST DEVICE: ICD-10-CM

## 2020-03-19 DIAGNOSIS — I50.42 CHRONIC COMBINED SYSTOLIC AND DIASTOLIC HEART FAILURE: ICD-10-CM

## 2020-03-19 LAB
ALBUMIN SERPL BCP-MCNC: 3.6 G/DL (ref 3.5–5.2)
ALP SERPL-CCNC: 127 U/L (ref 55–135)
ALT SERPL W/O P-5'-P-CCNC: 17 U/L (ref 10–44)
ANION GAP SERPL CALC-SCNC: 9 MMOL/L (ref 8–16)
AST SERPL-CCNC: 21 U/L (ref 10–40)
BASOPHILS # BLD AUTO: 0.05 K/UL (ref 0–0.2)
BASOPHILS NFR BLD: 0.6 % (ref 0–1.9)
BILIRUB DIRECT SERPL-MCNC: 0.4 MG/DL (ref 0.1–0.3)
BILIRUB SERPL-MCNC: 0.7 MG/DL (ref 0.1–1)
BNP SERPL-MCNC: 442 PG/ML (ref 0–99)
BUN SERPL-MCNC: 11 MG/DL (ref 6–20)
CALCIUM SERPL-MCNC: 9.3 MG/DL (ref 8.7–10.5)
CHLORIDE SERPL-SCNC: 100 MMOL/L (ref 95–110)
CO2 SERPL-SCNC: 28 MMOL/L (ref 23–29)
CREAT SERPL-MCNC: 0.8 MG/DL (ref 0.5–1.4)
CRP SERPL-MCNC: 14.2 MG/L (ref 0–8.2)
DIFFERENTIAL METHOD: ABNORMAL
EOSINOPHIL # BLD AUTO: 0.3 K/UL (ref 0–0.5)
EOSINOPHIL NFR BLD: 3.2 % (ref 0–8)
ERYTHROCYTE [DISTWIDTH] IN BLOOD BY AUTOMATED COUNT: 19.9 % (ref 11.5–14.5)
EST. GFR  (AFRICAN AMERICAN): >60 ML/MIN/1.73 M^2
EST. GFR  (NON AFRICAN AMERICAN): >60 ML/MIN/1.73 M^2
GLUCOSE SERPL-MCNC: 94 MG/DL (ref 70–110)
HCT VFR BLD AUTO: 38.7 % (ref 40–54)
HGB BLD-MCNC: 11.6 G/DL (ref 14–18)
IMM GRANULOCYTES # BLD AUTO: 0.03 K/UL (ref 0–0.04)
IMM GRANULOCYTES NFR BLD AUTO: 0.3 % (ref 0–0.5)
INR PPP: 2.9 (ref 0.8–1.2)
LDH SERPL L TO P-CCNC: 203 U/L (ref 110–260)
LYMPHOCYTES # BLD AUTO: 1.2 K/UL (ref 1–4.8)
LYMPHOCYTES NFR BLD: 14.3 % (ref 18–48)
MAGNESIUM SERPL-MCNC: 1.7 MG/DL (ref 1.6–2.6)
MCH RBC QN AUTO: 25.4 PG (ref 27–31)
MCHC RBC AUTO-ENTMCNC: 30 G/DL (ref 32–36)
MCV RBC AUTO: 85 FL (ref 82–98)
MONOCYTES # BLD AUTO: 0.5 K/UL (ref 0.3–1)
MONOCYTES NFR BLD: 5.2 % (ref 4–15)
NEUTROPHILS # BLD AUTO: 6.6 K/UL (ref 1.8–7.7)
NEUTROPHILS NFR BLD: 76.4 % (ref 38–73)
NRBC BLD-RTO: 0 /100 WBC
PHOSPHATE SERPL-MCNC: 3.8 MG/DL (ref 2.7–4.5)
PLATELET # BLD AUTO: 354 K/UL (ref 150–350)
PMV BLD AUTO: 9.1 FL (ref 9.2–12.9)
POTASSIUM SERPL-SCNC: 3.6 MMOL/L (ref 3.5–5.1)
PREALB SERPL-MCNC: 22 MG/DL (ref 20–43)
PROT SERPL-MCNC: 8.1 G/DL (ref 6–8.4)
PROTHROMBIN TIME: 27.4 SEC (ref 9–12.5)
RBC # BLD AUTO: 4.56 M/UL (ref 4.6–6.2)
SODIUM SERPL-SCNC: 137 MMOL/L (ref 136–145)
WBC # BLD AUTO: 8.69 K/UL (ref 3.9–12.7)

## 2020-03-19 PROCEDURE — 93750 OP LVAD INTERROGATION: ICD-10-PCS | Mod: S$GLB,,, | Performed by: INTERNAL MEDICINE

## 2020-03-19 PROCEDURE — 3008F BODY MASS INDEX DOCD: CPT | Mod: CPTII,S$GLB,, | Performed by: INTERNAL MEDICINE

## 2020-03-19 PROCEDURE — 93750 INTERROGATION VAD IN PERSON: CPT | Mod: S$GLB,,, | Performed by: INTERNAL MEDICINE

## 2020-03-19 PROCEDURE — 85610 PROTHROMBIN TIME: CPT

## 2020-03-19 PROCEDURE — 82248 BILIRUBIN DIRECT: CPT

## 2020-03-19 PROCEDURE — 80053 COMPREHEN METABOLIC PANEL: CPT

## 2020-03-19 PROCEDURE — 3008F PR BODY MASS INDEX (BMI) DOCUMENTED: ICD-10-PCS | Mod: CPTII,S$GLB,, | Performed by: INTERNAL MEDICINE

## 2020-03-19 PROCEDURE — 99999 PR PBB SHADOW E&M-EST. PATIENT-LVL III: ICD-10-PCS | Mod: PBBFAC,,, | Performed by: INTERNAL MEDICINE

## 2020-03-19 PROCEDURE — 99999 PR PBB SHADOW E&M-EST. PATIENT-LVL I: CPT | Mod: PBBFAC,,,

## 2020-03-19 PROCEDURE — 99214 OFFICE O/P EST MOD 30 MIN: CPT | Mod: S$GLB,,, | Performed by: INTERNAL MEDICINE

## 2020-03-19 PROCEDURE — 84134 ASSAY OF PREALBUMIN: CPT

## 2020-03-19 PROCEDURE — 99214 PR OFFICE/OUTPT VISIT, EST, LEVL IV, 30-39 MIN: ICD-10-PCS | Mod: S$GLB,,, | Performed by: INTERNAL MEDICINE

## 2020-03-19 PROCEDURE — 86140 C-REACTIVE PROTEIN: CPT

## 2020-03-19 PROCEDURE — 3078F DIAST BP <80 MM HG: CPT | Mod: CPTII,S$GLB,, | Performed by: INTERNAL MEDICINE

## 2020-03-19 PROCEDURE — 36415 COLL VENOUS BLD VENIPUNCTURE: CPT

## 2020-03-19 PROCEDURE — 85025 COMPLETE CBC W/AUTO DIFF WBC: CPT

## 2020-03-19 PROCEDURE — 99999 PR PBB SHADOW E&M-EST. PATIENT-LVL III: CPT | Mod: PBBFAC,,, | Performed by: INTERNAL MEDICINE

## 2020-03-19 PROCEDURE — 99999 PR PBB SHADOW E&M-EST. PATIENT-LVL I: ICD-10-PCS | Mod: PBBFAC,,,

## 2020-03-19 PROCEDURE — 3078F PR MOST RECENT DIASTOLIC BLOOD PRESSURE < 80 MM HG: ICD-10-PCS | Mod: CPTII,S$GLB,, | Performed by: INTERNAL MEDICINE

## 2020-03-19 PROCEDURE — 84100 ASSAY OF PHOSPHORUS: CPT

## 2020-03-19 PROCEDURE — 83735 ASSAY OF MAGNESIUM: CPT

## 2020-03-19 PROCEDURE — 3074F SYST BP LT 130 MM HG: CPT | Mod: CPTII,S$GLB,, | Performed by: INTERNAL MEDICINE

## 2020-03-19 PROCEDURE — 83880 ASSAY OF NATRIURETIC PEPTIDE: CPT

## 2020-03-19 PROCEDURE — 3074F PR MOST RECENT SYSTOLIC BLOOD PRESSURE < 130 MM HG: ICD-10-PCS | Mod: CPTII,S$GLB,, | Performed by: INTERNAL MEDICINE

## 2020-03-19 PROCEDURE — 83615 LACTATE (LD) (LDH) ENZYME: CPT

## 2020-03-19 NOTE — PROGRESS NOTES
INR at upper end of goal. Medications, chart, and patient findings reviewed. See calendar for adjustments to dose and follow up plan.

## 2020-03-19 NOTE — PROCEDURES
Mom known to me. In clinic today for lactation assistance. Baby loosing weight, suspect due to lack of breast milk production. Lactation assistance provided, see note. Baby last seen by me 12/2/2019 and doing. No mother assessment performed today.    TXP CHRISTY INTERROGATIONS 3/19/2020 3/12/2020 3/5/2020 2/27/2020 2/27/2020 2/26/2020 2/26/2020   Type HeartMate3 HeartMate3 HeartMate3 - - - -   Flow 4.1 4.2 4.1 - - - -   Speed 5300 5300 5300 - - - -   PI 4.3 5.1 5.9 - - - -   Power (Centeno) 4.0 4.0 4.0 - - - -   LSL 4900 4900 4900 - - - -   Pulsatility No Pulse No Pulse - Intermittent pulse Intermittent pulse Intermittent pulse Intermittent pulse   }

## 2020-03-19 NOTE — PROGRESS NOTES
Date of Implant with Heartmate 3 LVAD: 20    PATIENT ARRIVED IN CLINIC:  Wheelchair  Accompanied by: n/a    Vitals  Temperature, oral:   Temp Readings from Last 1 Encounters:   20 98.1 °F (36.7 °C) (Oral)     Blood Pressure:   BP Readings from Last 3 Encounters:   20 (!) 80/0   20 (!) 85/0   20 99/83        VAD Interrogation:  TXP CHRISTY INTERROGATIONS 3/19/2020 3/12/2020 3/5/2020   Type HeartMate3 HeartMate3 HeartMate3   Flow 4.1 4.2 4.1   Speed 5300 5300 5300   PI 4.3 5.1 5.9   Power (Centeno) 4.0 4.0 4.0   LSL 4900 4900 4900   Pulsatility No Pulse No Pulse -       History Lo.c3e  Problems / Issues / Alarms with VAD if any: None noted  VAD Sounds: HM3 Smooth  Heartmate 3 Module Cable:  No yellow exposed and Attempted to unscrew modular cable to ensure it will be able to come lose in the event we ever need to change the modular cable while patient held the driveline in place so it would not move. Modular cable connection able to be unscrewed and re-tightened. Instructed pt to perform this weekly.    HCT:   Lab Results   Component Value Date    HCT 38.7 (L) 2020    HCT 30 (L) 2020       Complaints/reason for visit today: routine  Emergency Equipment With Patient: yes   VAD Binder With Patient: yes   Reviewed VAD Numbers In Binder: yes    Any Equipment Issues: None noted (Refer to  note for complete details)    DLES Assessment:  Appearance Of Driveline: 1 with sutures, skin is excoriated to left of DLES.  After long  Discussion with pt, re-educated to make sure chloraprep is drying. Also will try to move dressing to allow air and decrease trauma. PT also educated on use and given medihoney to use on affected area as well.          Antibiotics: NO  Velour: no  Manual & Visual Inspection Of Driveline: No kinks or tears noted  Stabilization Device In Use: yes, barton securement device      Patient MyChart Questionnaire: No flowsheet data found.      Assessment:   PAIN: NO  Complaints Of Nausea / Vomiting: None noted    Appearance and Frequency Of Stools: normal and formed without blood & daily  Color Of Urine: clear/yellow  Coping/Depression/Anxiety: coping okay  Sleep Habits: 8 hrs /night  Sleep Aids: suggested to try melatonin per DR. Tafoya  Showering: No  Activity/Exercise: pt reports being active   Driving: No.    DLES Dressing Care:   Frequency of Dressing Changes: daily & daily kit  Pt In Need Of Management Kits?:no   It is medically necessary to have VAD management kits in order to prevent infection or to assist in the healing of an infected DLES.    Labs:    Chemistry        Component Value Date/Time     03/19/2020 1422     03/02/2020    K 3.6 03/19/2020 1422    K 4.5 03/02/2020     03/19/2020 1422    CO2 28 03/19/2020 1422    BUN 11 03/19/2020 1422    BUN 24 (A) 03/02/2020    CREATININE 0.8 03/19/2020 1422    CREATININE 0.9 03/02/2020    GLU 94 03/19/2020 1422        Component Value Date/Time    CALCIUM 9.3 03/19/2020 1422    CALCIUM 9.3 03/02/2020    ALKPHOS 127 03/19/2020 1422    AST 21 03/19/2020 1422    ALT 17 03/19/2020 1422    BILITOT 0.7 03/19/2020 1422    ESTGFRAFRICA >60.0 03/19/2020 1422    EGFRNONAA >60.0 03/19/2020 1422            Magnesium   Date Value Ref Range Status   03/19/2020 1.7 1.6 - 2.6 mg/dL Final       Lab Results   Component Value Date    WBC 8.69 03/19/2020    HGB 11.6 (L) 03/19/2020    HCT 38.7 (L) 03/19/2020    MCV 85 03/19/2020     (H) 03/19/2020       Lab Results   Component Value Date    INR 2.9 (H) 03/19/2020    INR 3.6 (H) 03/12/2020    INR 3.3 03/09/2020       BNP   Date Value Ref Range Status   03/19/2020 442 (H) 0 - 99 pg/mL Final     Comment:     Values of less than 100 pg/ml are consistent with non-CHF populations.   03/12/2020 434 (H) 0 - 99 pg/mL Final     Comment:     Values of less than 100 pg/ml are consistent with non-CHF populations.   03/05/2020 711 (H) 0 - 99 pg/mL Final      Comment:     Values of less than 100 pg/ml are consistent with non-CHF populations.       LD   Date Value Ref Range Status   03/19/2020 203 110 - 260 U/L Final     Comment:     Results are increased in hemolyzed samples.   03/12/2020 245 110 - 260 U/L Final     Comment:     Results are increased in hemolyzed samples.   03/12/2020 422 (H) 110 - 260 U/L Final     Comment:     Results are increased in hemolyzed samples.  *Result may be interfered by visible hemolysis         Labs reviewed with patient: YES      Patient Satisfaction Survey completed per patient: No  (explained about signature and box to check)  Medication reconciliation: per MA.  Medication Detail updated today: no  Coumadin Managed by: [facility]    Education: Reviewed driveline care, emergency procedures, how to change the controller, alarms with patient, as well as discussed how to page the VAD coordinator in case of an emergency.     Plans/Needs: PT reports doing well.  See above for driveline dressing area.  If medihoney doesn't work, Francisca Gavin suggested to allow pt use aquaphor or neosporin on area.  Pt also advised per Dr. Tafoya to try melatonin for sleep.  PT to RTC in 2 weeks for regular visit.  Please refer to MD note.      Hurricane Season: No

## 2020-03-19 NOTE — PROGRESS NOTES
"Subjective:   Patient ID:  Saba Lott is a 54 y.o. male who presents for LVAD followup visit.    Implant Date: 2/6/2020     Heartmate 3 RPM 5300     INR goal: 2-3   Bridge with Heparin   Antiplatelets:      TXP CHRISTY INTERROGATIONS 3/19/2020   Type HeartMate3   Flow 4.1   Speed 5300   PI 4.3   Power (Centeno) 4.0   LSL 4900   Pulsatility No Pulse       HPI   Mr. Lott is a very pleasant 53 year old AAM with DCM, HTN, tobacco and ETOH abuse (quit 2018), who presented to the hospital in January with ADHF/cardiogenic shock, was satrted on CRRT, had IABP placed, recovered from this from renal standpoint and labs, and proceeded with LVAD impalnt 02/06. Post op course was complicated by some RV failure requiring dobutamine, high dose diuretics, but was eventually weaned off successfully, with PICC and  weaned off as well. Patient had some post op thrombocytosis however heme onc consulted and felt it was reactive. Comes for his 3 rd clinic visit. Doing well. Has no complaints.  VAD speed is at 5300 rpm. No VAD alarms noted on interrogation occasional PI events . BP is 80 mm of Hg. DLES is a "1". INR is therapeutic at 2.9. LDH is 203 at baseline.     Review of Systems   Constitution: Negative. Negative for chills, decreased appetite, diaphoresis, fever, malaise/fatigue, night sweats, weight gain and weight loss.   Eyes: Negative.    Cardiovascular: Negative for chest pain, claudication, cyanosis, dyspnea on exertion, irregular heartbeat, leg swelling, near-syncope, orthopnea, palpitations, paroxysmal nocturnal dyspnea and syncope.   Respiratory: Negative for cough, hemoptysis and shortness of breath.    Endocrine: Negative.    Hematologic/Lymphatic: Negative.    Skin: Negative for color change, dry skin and nail changes.   Musculoskeletal: Negative.    Gastrointestinal: Negative.    Genitourinary: Negative.    Neurological: Negative for weakness.       Objective:   Blood pressure (!) 80/0, temperature 98.1 °F (36.7 " "°C), temperature source Oral, height 5' 7" (1.702 m), weight 78 kg (172 lb).body mass index is 26.94 kg/m².    Doppler: 80    Physical Exam   Constitutional: He appears well-developed.   BP (!) 80/0 (BP Location: Left arm, Patient Position: Sitting) Comment (BP Method): doppler  Temp 98.1 °F (36.7 °C) (Oral)   Ht 5' 7" (1.702 m)   Wt 78 kg (172 lb)   BMI 26.94 kg/m²      HENT:   Head: Normocephalic.   Neck: No JVD present. Carotid bruit is not present.   Cardiovascular: Regular rhythm and normal heart sounds.   No murmur heard.  Smooth VAD hum. DLES is "1"   Pulmonary/Chest: Effort normal and breath sounds normal. No respiratory distress. He has no wheezes. He has no rales.   Abdominal: Soft. Bowel sounds are normal. He exhibits no distension. There is no tenderness. There is no rebound.   Musculoskeletal: He exhibits no edema.   Neurological: He is alert.   Skin: Skin is warm.   Vitals reviewed.      Lab Results   Component Value Date    WBC 8.69 03/19/2020    HGB 11.6 (L) 03/19/2020    HCT 38.7 (L) 03/19/2020    MCV 85 03/19/2020     (H) 03/19/2020    CO2 28 03/19/2020    CREATININE 0.8 03/19/2020    CALCIUM 9.3 03/19/2020    ALBUMIN 3.6 03/19/2020    AST 21 03/19/2020     (H) 03/19/2020    ALT 17 03/19/2020     03/19/2020       Lab Results   Component Value Date    INR 2.9 (H) 03/19/2020    INR 3.6 (H) 03/12/2020    INR 3.3 03/09/2020       BNP   Date Value Ref Range Status   03/19/2020 442 (H) 0 - 99 pg/mL Final     Comment:     Values of less than 100 pg/ml are consistent with non-CHF populations.   03/12/2020 434 (H) 0 - 99 pg/mL Final     Comment:     Values of less than 100 pg/ml are consistent with non-CHF populations.   03/05/2020 711 (H) 0 - 99 pg/mL Final     Comment:     Values of less than 100 pg/ml are consistent with non-CHF populations.       LD   Date Value Ref Range Status   03/19/2020 203 110 - 260 U/L Final     Comment:     Results are increased in hemolyzed samples. "   03/12/2020 245 110 - 260 U/L Final     Comment:     Results are increased in hemolyzed samples.   03/12/2020 422 (H) 110 - 260 U/L Final     Comment:     Results are increased in hemolyzed samples.  *Result may be interfered by visible hemolysis       Assessment:      1. Heart replaced by heart assist device    2. Chronic combined systolic and diastolic heart failure    3. Non-ischemic cardiomyopathy    4. AICD (automatic cardioverter/defibrillator) present        Plan:   Patient is now NYHA class II. BP is controlled.  INR is therapeutic.  VAD interrogation was performed in clinic  Any VAD management kits dispensed today medically necessary  Recommend 2 gram sodium restriction and 1500cc fluid restriction.  Encourage physical activity with graded exercise program.  Requested patient to weigh themselves daily, and to notify us if their weight increases by more than 3 lbs in 1 day or 5 lbs in 1 week.   RTC in 2 weeks    Listed for transplant: No. Implanted as DT.     UNOS Patient Status  Functional Status: 90% - Able to carry on normal activity: minor symptoms of disease  Physical Capacity: No Limitations  Working for Income: No  If no, reason not working: Demands of Treatment    Jeanette Tafoya MD

## 2020-03-19 NOTE — LETTER
March 19, 2020        Miko Catalan  [address]  [place] [address]  Phone: [phone]  Fax: [phone]             [facility] [address] [place] [address]  Phone: [phone]   Patient: Saba Lott   MR Number: [id number]   YOB: 1965   Date of Visit: 3/19/2020       Dear Dr. Miko Catalan    Thank you for referring Saba Lott to me for evaluation. Attached you will find relevant portions of my assessment and plan of care.    If you have questions, please do not hesitate to call me. I look forward to following Saba Lott along with you.    Sincerely,    Jeanette Tafoya MD    Enclosure    If you would like to receive this communication electronically, please contact [email or web address] or [phone] to request Rev[employer] access.    [employer] is a tool which provides read-only access to select patient information with whom you have a relationship. Its easy to use and provides real time access to review your patients record including encounter summaries, notes, results, and demographic information.    If you feel you have received this communication in error or would no longer like to receive these types of communications, please e-mail [email or web address]

## 2020-03-26 LAB — INR PPP: 3.5

## 2020-03-27 ENCOUNTER — ANTI-COAG VISIT (OUTPATIENT)
Dept: CARDIOLOGY | Facility: CLINIC | Age: 55
End: 2020-03-27
Payer: MEDICARE

## 2020-03-27 DIAGNOSIS — Z95.811 LVAD (LEFT VENTRICULAR ASSIST DEVICE) PRESENT: ICD-10-CM

## 2020-03-27 PROCEDURE — 93793 ANTICOAG MGMT PT WARFARIN: CPT | Mod: S$GLB,,,

## 2020-03-27 PROCEDURE — 93793 PR ANTICOAGULANT MGMT FOR PT TAKING WARFARIN: ICD-10-PCS | Mod: S$GLB,,,

## 2020-03-27 NOTE — PROGRESS NOTES
INR not at goal. Medications, chart, and patient findings reviewed. See calendar for adjustments to dose and follow up plan.  Will hold and lower dose again this week.

## 2020-03-27 NOTE — PROGRESS NOTES
Patient questioned and confirmed correct dose - reports not having greens intake last week and denies bleeding, new meds diet changes or etc.

## 2020-03-30 NOTE — PROGRESS NOTES
Coverage information:     Subscriber: [id number] MARCO ANTONIO SPRAGUE     Rel to sub: 01 - Self     Member ID: [id number]     Payor: [id number]-[employer]ICARE Ph: [phone]     Benefit plan:  Coverage information:     Subscriber: [id number] MARCO ANTONIO SPRAGUE     Rel to sub: 01 - Self     Member ID: [id number]     Payor: 3100-MEDICAID Ph: [phone]     Benefit plan: [id number]-MEDICAID [facility] Ph: [phone]     Group number: [id number]     Member effective dates: from 11/01/18       [id number] SNP (SPECIAL NEEDS PLAN) Ph: [phone]     Group number: [id number]     Member effective dates: from 10/01/19      Coverage information:     Subscriber: [id number] MARCO ANTONIO SPRAGUE     Rel to sub: 01 - Self     Member ID: [id number]     Payor: 2100-MEDICARE     Benefit plan: [id number]-MEDICARE DISABILITY Ph: [phone]     Group number: Not given     Member effective dates: from 12/01/14      [id number]  [id number]    6/2/20   ins & address refaxed to Oleg OWUSU emailed to MD

## 2020-04-02 ENCOUNTER — TELEPHONE (OUTPATIENT)
Dept: TRANSPLANT | Facility: CLINIC | Age: 55
End: 2020-04-02

## 2020-04-02 LAB
BILIRUBIN DIRECT+TOT PNL SERPL-MCNC: 0.5 MG/DL (ref 0.01–0.4)
EXT ALBUMIN: 4
EXT ALT: 20
EXT AST: 20
EXT BNP: 372
EXT BUN: 16
EXT CALCIUM: 9.2
EXT CHLORIDE: 101
EXT CREATININE: 1.18 MG/DL
EXT GLUCOSE: 113
EXT HEMATOCRIT: 39.3
EXT HEMOGLOBIN: 12
EXT MAGNESIUM: 1.8
EXT PLATELETS: 424
EXT POTASSIUM: 4.1
EXT PROTEIN TOTAL: 7.8
EXT SODIUM: 136 MMOL/L
EXT WBC: 9.9

## 2020-04-02 NOTE — TELEPHONE ENCOUNTER
Labs received from [facility] the [facility] for review.     Labs - slight hemolysis present

## 2020-04-03 ENCOUNTER — ANTI-COAG VISIT (OUTPATIENT)
Dept: CARDIOLOGY | Facility: CLINIC | Age: 55
End: 2020-04-03
Payer: MEDICARE

## 2020-04-03 DIAGNOSIS — Z95.811 LVAD (LEFT VENTRICULAR ASSIST DEVICE) PRESENT: Primary | ICD-10-CM

## 2020-04-03 DIAGNOSIS — Z79.01 LONG TERM (CURRENT) USE OF ANTICOAGULANTS: ICD-10-CM

## 2020-04-03 LAB — INR PPP: 1.3

## 2020-04-03 PROCEDURE — 93793 ANTICOAG MGMT PT WARFARIN: CPT | Mod: S$GLB,,,

## 2020-04-03 PROCEDURE — 93793 PR ANTICOAGULANT MGMT FOR PT TAKING WARFARIN: ICD-10-PCS | Mod: S$GLB,,,

## 2020-04-03 NOTE — PROGRESS NOTES
"INR not at goal. Medications, chart, and patient findings reviewed. See calendar for adjustments to dose and follow up plan.  Joelle from [facility] has called.  Unfortunately, the  samples were not "checked off" for a PT/ INR order, so we do not have a result from yesterday.  INR via fingerstick is 1.3, repeat is 1.2.  We will boost tonight and he is scheduled for "higher doses" over the weekend that will hopefully improve his INR. Denies missed doses, extra greens or low flow alarms.  Not a lovenox candidate due to implant timeline.  He would normally need admission, but in light of C19 and distance from [facility],  we will boost for now.  Dr Floyd is aware and is requesting a STAT  tomorrow, Saturday.  I have called the on call nurse at [facility], they will get a STAT  INR in the morning 4/4 and call me with the results.  May need new tablet size for easy of adjustments.    "

## 2020-04-04 ENCOUNTER — ANTI-COAG VISIT (OUTPATIENT)
Dept: CARDIOLOGY | Facility: CLINIC | Age: 55
End: 2020-04-04

## 2020-04-04 LAB — INR PPP: 1.3

## 2020-04-04 NOTE — PROGRESS NOTES
INR not at goal. Medications, chart, and patient findings reviewed. See calendar for adjustments to dose and follow up plan.  Repeat INR today, 4/4, remains at 1.3.  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  STAT INR for Monday 4/6/20.  Denies missed doses, extra greens, no dips with cilantro or parsley, plums or prunes, no nuts.  No significant dietary changes.  We discussed his doses for the next 2 days in both tablet number and MGs.  He denies LFAs, no tea colored urine.  I have asked him to call me back with any concerns or questions.

## 2020-04-06 ENCOUNTER — ANTI-COAG VISIT (OUTPATIENT)
Dept: CARDIOLOGY | Facility: CLINIC | Age: 55
End: 2020-04-06
Payer: MEDICARE

## 2020-04-06 ENCOUNTER — LAB VISIT (OUTPATIENT)
Dept: LAB | Facility: HOSPITAL | Age: 55
End: 2020-04-06
Attending: INTERNAL MEDICINE
Payer: MEDICARE

## 2020-04-06 DIAGNOSIS — I50.43 ACUTE ON CHRONIC COMBINED SYSTOLIC AND DIASTOLIC HEART FAILURE: Primary | ICD-10-CM

## 2020-04-06 LAB
ALBUMIN SERPL BCP-MCNC: 4 G/DL (ref 3.5–5.2)
ALP SERPL-CCNC: 115 U/L (ref 55–135)
ALT SERPL W/O P-5'-P-CCNC: 16 U/L (ref 10–44)
ANION GAP SERPL CALC-SCNC: 10 MMOL/L (ref 8–16)
AST SERPL-CCNC: 20 U/L (ref 10–40)
BASOPHILS # BLD AUTO: 0.04 K/UL (ref 0–0.2)
BASOPHILS NFR BLD: 0.4 % (ref 0–1.9)
BILIRUB SERPL-MCNC: 0.4 MG/DL (ref 0.1–1)
BNP SERPL-MCNC: 257 PG/ML (ref 0–99)
BUN SERPL-MCNC: 14 MG/DL (ref 6–20)
CALCIUM SERPL-MCNC: 9.8 MG/DL (ref 8.7–10.5)
CHLORIDE SERPL-SCNC: 100 MMOL/L (ref 95–110)
CO2 SERPL-SCNC: 23 MMOL/L (ref 23–29)
CREAT SERPL-MCNC: 1 MG/DL (ref 0.5–1.4)
DIFFERENTIAL METHOD: ABNORMAL
EOSINOPHIL # BLD AUTO: 0.4 K/UL (ref 0–0.5)
EOSINOPHIL NFR BLD: 4.4 % (ref 0–8)
ERYTHROCYTE [DISTWIDTH] IN BLOOD BY AUTOMATED COUNT: 18.4 % (ref 11.5–14.5)
EST. GFR  (AFRICAN AMERICAN): >60 ML/MIN/1.73 M^2
EST. GFR  (NON AFRICAN AMERICAN): >60 ML/MIN/1.73 M^2
GLUCOSE SERPL-MCNC: 106 MG/DL (ref 70–110)
HCT VFR BLD AUTO: 38.3 % (ref 40–54)
HGB BLD-MCNC: 12.6 G/DL (ref 14–18)
IMM GRANULOCYTES # BLD AUTO: 0.03 K/UL (ref 0–0.04)
IMM GRANULOCYTES NFR BLD AUTO: 0.3 % (ref 0–0.5)
INR PPP: 3.3 (ref 0.8–1.2)
LYMPHOCYTES # BLD AUTO: 1.7 K/UL (ref 1–4.8)
LYMPHOCYTES NFR BLD: 18.7 % (ref 18–48)
MAGNESIUM SERPL-MCNC: 1.9 MG/DL (ref 1.6–2.6)
MCH RBC QN AUTO: 26 PG (ref 27–31)
MCHC RBC AUTO-ENTMCNC: 32.9 G/DL (ref 32–36)
MCV RBC AUTO: 79 FL (ref 82–98)
MONOCYTES # BLD AUTO: 0.5 K/UL (ref 0.3–1)
MONOCYTES NFR BLD: 5.9 % (ref 4–15)
NEUTROPHILS # BLD AUTO: 6.4 K/UL (ref 1.8–7.7)
NEUTROPHILS NFR BLD: 70.3 % (ref 38–73)
NRBC BLD-RTO: 0 /100 WBC
PLATELET # BLD AUTO: 358 K/UL (ref 150–350)
PMV BLD AUTO: 8.9 FL (ref 9.2–12.9)
POTASSIUM SERPL-SCNC: 4.1 MMOL/L (ref 3.5–5.1)
PROT SERPL-MCNC: 8.1 G/DL (ref 6–8.4)
PROTHROMBIN TIME: 33.9 SEC (ref 9–12.5)
RBC # BLD AUTO: 4.85 M/UL (ref 4.6–6.2)
SODIUM SERPL-SCNC: 133 MMOL/L (ref 136–145)
WBC # BLD AUTO: 9.1 K/UL (ref 3.9–12.7)

## 2020-04-06 PROCEDURE — 83880 ASSAY OF NATRIURETIC PEPTIDE: CPT | Mod: PO

## 2020-04-06 PROCEDURE — 93793 ANTICOAG MGMT PT WARFARIN: CPT | Mod: S$GLB,,,

## 2020-04-06 PROCEDURE — 85025 COMPLETE CBC W/AUTO DIFF WBC: CPT | Mod: PO

## 2020-04-06 PROCEDURE — 93793 PR ANTICOAGULANT MGMT FOR PT TAKING WARFARIN: ICD-10-PCS | Mod: S$GLB,,,

## 2020-04-06 PROCEDURE — 83735 ASSAY OF MAGNESIUM: CPT | Mod: PO

## 2020-04-06 PROCEDURE — 80053 COMPREHEN METABOLIC PANEL: CPT | Mod: PO

## 2020-04-06 PROCEDURE — 85610 PROTHROMBIN TIME: CPT | Mod: PO

## 2020-04-06 NOTE — PROGRESS NOTES
INR not at goal. Medications, chart, and patient findings reviewed. See calendar for adjustments to dose and follow up plan.  INR is much improved after large boosted doses.  We will use 1/2 tablet tonight, then increase dose back to 7.5 mg daily, recheck on Wednesday.

## 2020-04-06 NOTE — PROGRESS NOTES
I have reached out to Stat  3 times on 4/6/20 to confirm INR draw. Twice I reached  this morning and left messages.  I called again at 1 pm and reached the nurse.  I was informed by the  nurse that there would be an INR drawn today.  I have again call STAT as the INR has not resulted to us as of 4:45pm.  I have reached the after hours nurse who reports that the sample was just dropped at an [facility] lab a few minutes ago.  I will continue to check for the result this evening.  No dosing will be done tonight as it will likely result late on 4/6/20.

## 2020-04-08 LAB — INR PPP: 2.9

## 2020-04-09 ENCOUNTER — ANTI-COAG VISIT (OUTPATIENT)
Dept: CARDIOLOGY | Facility: CLINIC | Age: 55
End: 2020-04-09
Payer: MEDICARE

## 2020-04-09 PROCEDURE — 93793 PR ANTICOAGULANT MGMT FOR PT TAKING WARFARIN: ICD-10-PCS | Mod: S$GLB,,,

## 2020-04-09 PROCEDURE — 93793 ANTICOAG MGMT PT WARFARIN: CPT | Mod: S$GLB,,,

## 2020-04-09 NOTE — PROGRESS NOTES
Verbal from Beth, STAT HH - 30.3/2.9 - PT/INR.  INR at goal. Medications and chart reviewed. No changes noted to necessitate adjustment of warfarin or follow-up plan. See calendar.

## 2020-04-13 ENCOUNTER — DOCUMENTATION ONLY (OUTPATIENT)
Dept: TRANSPLANT | Facility: CLINIC | Age: 55
End: 2020-04-13

## 2020-04-13 ENCOUNTER — ANTI-COAG VISIT (OUTPATIENT)
Dept: CARDIOLOGY | Facility: CLINIC | Age: 55
End: 2020-04-13
Payer: MEDICARE

## 2020-04-13 LAB
BNP: 264
EXT ALBUMIN: 3.9
EXT ALT: 15
EXT AST: 16
EXT BUN: 10
EXT CALCIUM: 9.1
EXT CHLORIDE: 100
EXT CREATININE: 0.83 MG/DL
EXT GLUCOSE: 95
EXT HEMATOCRIT: 41.1
EXT HEMOGLOBIN: 12.6
EXT MAGNESIUM: 1.9
EXT PLATELETS: 306
EXT POTASSIUM: 3.8
EXT PROTEIN TOTAL: 7.5
EXT SODIUM: 137 MMOL/L
EXT WBC: 9.1
INR PPP: 2.7

## 2020-04-13 PROCEDURE — 93793 ANTICOAG MGMT PT WARFARIN: CPT | Mod: S$GLB,,,

## 2020-04-13 PROCEDURE — 93793 PR ANTICOAGULANT MGMT FOR PT TAKING WARFARIN: ICD-10-PCS | Mod: S$GLB,,,

## 2020-04-17 ENCOUNTER — ANTI-COAG VISIT (OUTPATIENT)
Dept: CARDIOLOGY | Facility: CLINIC | Age: 55
End: 2020-04-17
Payer: MEDICARE

## 2020-04-17 DIAGNOSIS — Z95.811 LVAD (LEFT VENTRICULAR ASSIST DEVICE) PRESENT: ICD-10-CM

## 2020-04-17 LAB — INR PPP: 3.1

## 2020-04-17 PROCEDURE — 93793 ANTICOAG MGMT PT WARFARIN: CPT | Mod: S$GLB,,,

## 2020-04-17 PROCEDURE — 93793 PR ANTICOAGULANT MGMT FOR PT TAKING WARFARIN: ICD-10-PCS | Mod: S$GLB,,,

## 2020-04-17 NOTE — PROGRESS NOTES
Nurse called today 04/17/20 to inform us that patient was a *Hard Stick). He only had 1 (/linda) left and that was very small. Advise to do (Fingerstick).

## 2020-04-17 NOTE — PROGRESS NOTES
INR not at goal. Medications, chart, and patient findings reviewed. See calendar for adjustments to dose and follow up plan.  Just above goal, I will lower his overall dose with Fridays being a lower dose day, recheck Monday with HH.  Patient has repeated back dosing directions.  He has no complaints or issues today.

## 2020-04-17 NOTE — PROGRESS NOTES
Verbal result taken from Gina/[facility]_________. PT/INR _3.1/Fingerstick______ Date drawn_04/17/20._______ Hardcopy to be faxed.

## 2020-04-20 LAB
ALBUMIN: 4.2
ALT SERPL-CCNC: 18 U/L
AST SERPL-CCNC: 19 U/L
BNP: 226
BUN BLD-MCNC: 16 MG/DL (ref 4–21)
CALCIUM SERPL-MCNC: 9.2 MG/DL
CARBON DIOXIDE, CO2: 22
CHLORIDE: 100
GLUCOSE: 83
HCT VFR BLD AUTO: 41 % (ref 41–53)
HGB BLD-MCNC: 12.7 G/DL (ref 13.5–17.5)
INR PPP: 2.7
MAGNESIUM SERPL-MCNC: 1.9 MG/DL (ref 1.6–2.4)
PLATELET COUNT: 319
POTASSIUM: 3.7
PROTEIN TOTAL: 7.9
SODIUM: 135
TOTAL BILIRUBIN POC: 0.7
WBC: 8.6

## 2020-04-21 ENCOUNTER — ANTI-COAG VISIT (OUTPATIENT)
Dept: CARDIOLOGY | Facility: CLINIC | Age: 55
End: 2020-04-21
Payer: MEDICARE

## 2020-04-21 ENCOUNTER — TELEPHONE (OUTPATIENT)
Dept: TRANSPLANT | Facility: CLINIC | Age: 55
End: 2020-04-21

## 2020-04-21 DIAGNOSIS — Z95.811 LVAD (LEFT VENTRICULAR ASSIST DEVICE) PRESENT: ICD-10-CM

## 2020-04-21 PROCEDURE — 93793 PR ANTICOAGULANT MGMT FOR PT TAKING WARFARIN: ICD-10-PCS | Mod: S$GLB,,,

## 2020-04-21 PROCEDURE — 93793 ANTICOAG MGMT PT WARFARIN: CPT | Mod: S$GLB,,,

## 2020-04-21 NOTE — PROGRESS NOTES
INR at goal. Medications reviewed and no changes noted to necessitate warfarin adjustment.   See calendar.

## 2020-04-27 LAB
ALBUMIN: 4.3
ALT SERPL-CCNC: 16 U/L
AST SERPL-CCNC: 19 U/L
BILIRUBIN TOTAL: 0.5
BNP: 227
BUN BLD-MCNC: 12 MG/DL (ref 4–21)
CALCIUM SERPL-MCNC: 9.5 MG/DL
CARBON DIOXIDE, CO2: 25
CHLORIDE: 100
CREAT SERPL-MCNC: 0.8 MG/DL
EXT INR: 3.3
EXT PROTHROMBIN TIME, INR (MECH HEART VALVE/ANTIPHOSPHOLIPID): 33.2
GLUCOSE: 85
INR PPP: 3.3
MAGNESIUM SERPL-MCNC: 1.9 MG/DL (ref 1.6–2.4)
POTASSIUM: 3.8
PROTEIN TOTAL: 8.2
SODIUM: 137

## 2020-04-28 ENCOUNTER — ANTI-COAG VISIT (OUTPATIENT)
Dept: CARDIOLOGY | Facility: CLINIC | Age: 55
End: 2020-04-28
Payer: MEDICARE

## 2020-04-28 ENCOUNTER — TELEPHONE (OUTPATIENT)
Dept: TRANSPLANT | Facility: CLINIC | Age: 55
End: 2020-04-28

## 2020-04-28 DIAGNOSIS — Z95.811 LVAD (LEFT VENTRICULAR ASSIST DEVICE) PRESENT: ICD-10-CM

## 2020-04-28 PROCEDURE — 93793 ANTICOAG MGMT PT WARFARIN: CPT | Mod: S$GLB,,,

## 2020-04-28 PROCEDURE — 93793 PR ANTICOAGULANT MGMT FOR PT TAKING WARFARIN: ICD-10-PCS | Mod: S$GLB,,,

## 2020-04-28 NOTE — PROGRESS NOTES
Wife questioned and confirmed dose doesn't eat his vegetables as often diet on and off he eats a lot of meat  - she denied any other changes.

## 2020-04-29 ENCOUNTER — TELEPHONE (OUTPATIENT)
Dept: TRANSPLANT | Facility: CLINIC | Age: 55
End: 2020-04-29

## 2020-04-29 NOTE — TELEPHONE ENCOUNTER
----- Message from Kimi Mendez sent at 4/29/2020  9:37 AM CDT -----  Contact: PT  Pt would like to reschedule his 04/30 appointment . He can be reached at [phone] thanks

## 2020-04-29 NOTE — TELEPHONE ENCOUNTER
Called and spoke to pt.  Pt reports lack of transportation due to caregivers being >69 y/o and worried about coming to [place].  Re-assured pt caregiver that only pt will be allowed in to appointment, caregivers must stay in the car.  Also reiterated to patient and caregiver all of the protective methods [facility] is using in clinic areas to keep pt safety at utmost concern.  Pt and caregiver verbalize understanding and will come to appointment tomorrow.   Pt encouraged to call VAD coordinator with any questions problems or concerns. Pt reminded to page VAD coordinator on call for emergencies.  Pt verbalized understanding and in agreement of plan.

## 2020-04-30 ENCOUNTER — OFFICE VISIT (OUTPATIENT)
Dept: TRANSPLANT | Facility: CLINIC | Age: 55
End: 2020-04-30
Payer: MEDICARE

## 2020-04-30 ENCOUNTER — LAB VISIT (OUTPATIENT)
Dept: LAB | Facility: HOSPITAL | Age: 55
End: 2020-04-30
Attending: INTERNAL MEDICINE
Payer: MEDICARE

## 2020-04-30 ENCOUNTER — CLINICAL SUPPORT (OUTPATIENT)
Dept: TRANSPLANT | Facility: CLINIC | Age: 55
End: 2020-04-30
Payer: MEDICARE

## 2020-04-30 ENCOUNTER — ANTI-COAG VISIT (OUTPATIENT)
Dept: CARDIOLOGY | Facility: CLINIC | Age: 55
End: 2020-04-30
Payer: MEDICAID

## 2020-04-30 VITALS
BODY MASS INDEX: 27.94 KG/M2 | TEMPERATURE: 98 F | HEART RATE: 48 BPM | WEIGHT: 178 LBS | HEIGHT: 67 IN | SYSTOLIC BLOOD PRESSURE: 90 MMHG

## 2020-04-30 DIAGNOSIS — Z95.811 HEART REPLACED BY HEART ASSIST DEVICE: Primary | ICD-10-CM

## 2020-04-30 DIAGNOSIS — I50.42 CHRONIC COMBINED SYSTOLIC AND DIASTOLIC CONGESTIVE HEART FAILURE: ICD-10-CM

## 2020-04-30 DIAGNOSIS — Z95.810 AICD (AUTOMATIC CARDIOVERTER/DEFIBRILLATOR) PRESENT: ICD-10-CM

## 2020-04-30 DIAGNOSIS — I42.8 NON-ISCHEMIC CARDIOMYOPATHY: ICD-10-CM

## 2020-04-30 DIAGNOSIS — Z95.811 HEART REPLACED BY HEART ASSIST DEVICE: ICD-10-CM

## 2020-04-30 LAB
ALBUMIN SERPL BCP-MCNC: 4.3 G/DL (ref 3.5–5.2)
ALP SERPL-CCNC: 150 U/L (ref 55–135)
ALT SERPL W/O P-5'-P-CCNC: 20 U/L (ref 10–44)
ANION GAP SERPL CALC-SCNC: 9 MMOL/L (ref 8–16)
AST SERPL-CCNC: 22 U/L (ref 10–40)
BASOPHILS # BLD AUTO: 0.03 K/UL (ref 0–0.2)
BASOPHILS NFR BLD: 0.4 % (ref 0–1.9)
BILIRUB DIRECT SERPL-MCNC: 0.3 MG/DL (ref 0.1–0.3)
BILIRUB SERPL-MCNC: 0.6 MG/DL (ref 0.1–1)
BNP SERPL-MCNC: 205 PG/ML (ref 0–99)
BUN SERPL-MCNC: 12 MG/DL (ref 6–20)
CALCIUM SERPL-MCNC: 9.9 MG/DL (ref 8.7–10.5)
CHLORIDE SERPL-SCNC: 98 MMOL/L (ref 95–110)
CO2 SERPL-SCNC: 28 MMOL/L (ref 23–29)
CREAT SERPL-MCNC: 1.1 MG/DL (ref 0.5–1.4)
CRP SERPL-MCNC: 9 MG/L (ref 0–8.2)
DIFFERENTIAL METHOD: ABNORMAL
EOSINOPHIL # BLD AUTO: 0.1 K/UL (ref 0–0.5)
EOSINOPHIL NFR BLD: 2 % (ref 0–8)
ERYTHROCYTE [DISTWIDTH] IN BLOOD BY AUTOMATED COUNT: 18.5 % (ref 11.5–14.5)
EST. GFR  (AFRICAN AMERICAN): >60 ML/MIN/1.73 M^2
EST. GFR  (NON AFRICAN AMERICAN): >60 ML/MIN/1.73 M^2
GLUCOSE SERPL-MCNC: 85 MG/DL (ref 70–110)
HCT VFR BLD AUTO: 49.2 % (ref 40–54)
HGB BLD-MCNC: 14.7 G/DL (ref 14–18)
IMM GRANULOCYTES # BLD AUTO: 0.01 K/UL (ref 0–0.04)
IMM GRANULOCYTES NFR BLD AUTO: 0.1 % (ref 0–0.5)
INR PPP: 3.7 (ref 0.8–1.2)
LDH SERPL L TO P-CCNC: 194 U/L (ref 110–260)
LYMPHOCYTES # BLD AUTO: 1.2 K/UL (ref 1–4.8)
LYMPHOCYTES NFR BLD: 16.9 % (ref 18–48)
MAGNESIUM SERPL-MCNC: 1.8 MG/DL (ref 1.6–2.6)
MCH RBC QN AUTO: 24.7 PG (ref 27–31)
MCHC RBC AUTO-ENTMCNC: 29.9 G/DL (ref 32–36)
MCV RBC AUTO: 83 FL (ref 82–98)
MONOCYTES # BLD AUTO: 0.4 K/UL (ref 0.3–1)
MONOCYTES NFR BLD: 5.5 % (ref 4–15)
NEUTROPHILS # BLD AUTO: 5.1 K/UL (ref 1.8–7.7)
NEUTROPHILS NFR BLD: 75.1 % (ref 38–73)
NRBC BLD-RTO: 0 /100 WBC
PHOSPHATE SERPL-MCNC: 3.8 MG/DL (ref 2.7–4.5)
PLATELET # BLD AUTO: 344 K/UL (ref 150–350)
PMV BLD AUTO: 8.9 FL (ref 9.2–12.9)
POTASSIUM SERPL-SCNC: 3.9 MMOL/L (ref 3.5–5.1)
PREALB SERPL-MCNC: 23 MG/DL (ref 20–43)
PROT SERPL-MCNC: 9.1 G/DL (ref 6–8.4)
PROTHROMBIN TIME: 35.3 SEC (ref 9–12.5)
RBC # BLD AUTO: 5.95 M/UL (ref 4.6–6.2)
SODIUM SERPL-SCNC: 135 MMOL/L (ref 136–145)
WBC # BLD AUTO: 6.86 K/UL (ref 3.9–12.7)

## 2020-04-30 PROCEDURE — 84100 ASSAY OF PHOSPHORUS: CPT

## 2020-04-30 PROCEDURE — 36415 COLL VENOUS BLD VENIPUNCTURE: CPT

## 2020-04-30 PROCEDURE — 99214 OFFICE O/P EST MOD 30 MIN: CPT | Mod: S$GLB,,, | Performed by: INTERNAL MEDICINE

## 2020-04-30 PROCEDURE — 99999 PR PBB SHADOW E&M-EST. PATIENT-LVL III: CPT | Mod: PBBFAC,,, | Performed by: INTERNAL MEDICINE

## 2020-04-30 PROCEDURE — 3074F SYST BP LT 130 MM HG: CPT | Mod: CPTII,S$GLB,, | Performed by: INTERNAL MEDICINE

## 2020-04-30 PROCEDURE — 3078F PR MOST RECENT DIASTOLIC BLOOD PRESSURE < 80 MM HG: ICD-10-PCS | Mod: CPTII,S$GLB,, | Performed by: INTERNAL MEDICINE

## 2020-04-30 PROCEDURE — 84134 ASSAY OF PREALBUMIN: CPT

## 2020-04-30 PROCEDURE — 82248 BILIRUBIN DIRECT: CPT

## 2020-04-30 PROCEDURE — 3078F DIAST BP <80 MM HG: CPT | Mod: CPTII,S$GLB,, | Performed by: INTERNAL MEDICINE

## 2020-04-30 PROCEDURE — 99999 PR PBB SHADOW E&M-EST. PATIENT-LVL III: ICD-10-PCS | Mod: PBBFAC,,, | Performed by: INTERNAL MEDICINE

## 2020-04-30 PROCEDURE — 80053 COMPREHEN METABOLIC PANEL: CPT

## 2020-04-30 PROCEDURE — 99999 PR PBB SHADOW E&M-EST. PATIENT-LVL I: CPT | Mod: PBBFAC,,,

## 2020-04-30 PROCEDURE — 3008F PR BODY MASS INDEX (BMI) DOCUMENTED: ICD-10-PCS | Mod: CPTII,S$GLB,, | Performed by: INTERNAL MEDICINE

## 2020-04-30 PROCEDURE — 99214 PR OFFICE/OUTPT VISIT, EST, LEVL IV, 30-39 MIN: ICD-10-PCS | Mod: S$GLB,,, | Performed by: INTERNAL MEDICINE

## 2020-04-30 PROCEDURE — 3008F BODY MASS INDEX DOCD: CPT | Mod: CPTII,S$GLB,, | Performed by: INTERNAL MEDICINE

## 2020-04-30 PROCEDURE — 93750 OP LVAD INTERROGATION: ICD-10-PCS | Mod: S$GLB,,, | Performed by: INTERNAL MEDICINE

## 2020-04-30 PROCEDURE — 3074F PR MOST RECENT SYSTOLIC BLOOD PRESSURE < 130 MM HG: ICD-10-PCS | Mod: CPTII,S$GLB,, | Performed by: INTERNAL MEDICINE

## 2020-04-30 PROCEDURE — 83880 ASSAY OF NATRIURETIC PEPTIDE: CPT

## 2020-04-30 PROCEDURE — 99999 PR PBB SHADOW E&M-EST. PATIENT-LVL I: ICD-10-PCS | Mod: PBBFAC,,,

## 2020-04-30 PROCEDURE — 86140 C-REACTIVE PROTEIN: CPT

## 2020-04-30 PROCEDURE — 85025 COMPLETE CBC W/AUTO DIFF WBC: CPT

## 2020-04-30 PROCEDURE — 93750 INTERROGATION VAD IN PERSON: CPT | Mod: S$GLB,,, | Performed by: INTERNAL MEDICINE

## 2020-04-30 PROCEDURE — 83735 ASSAY OF MAGNESIUM: CPT

## 2020-04-30 PROCEDURE — 85610 PROTHROMBIN TIME: CPT

## 2020-04-30 PROCEDURE — 83615 LACTATE (LD) (LDH) ENZYME: CPT

## 2020-04-30 NOTE — PROGRESS NOTES
Date of Implant with Heartmate 3 LVAD: 20    PATIENT ARRIVED IN CLINIC:  Ambulatory   Accompanied by: COVID restriction    Vitals  Temperature, oral:   Temp Readings from Last 1 Encounters:   20 98.4 °F (36.9 °C) (Oral)     Blood Pressure:   BP Readings from Last 3 Encounters:   20 (!) 90/0   20 (!) 80/0   20 (!) 85/0        VAD Interrogation:  TXP CHRISTY INTERROGATIONS 2020 3/19/2020 3/12/2020   Type HeartMate3 HeartMate3 HeartMate3   Flow 4.0 4.1 4.2   Speed 5300 5300 5300   PI 5.1 4.3 5.1   Power (Centeno) 4.1 4.0 4.0   LSL 4900 4900 4900   Pulsatility Intermittent pulse No Pulse No Pulse       History Lo.c3e  Problems / Issues / Alarms with VAD if any: some low voltages noted , but due to battery depletion  VAD Sounds: HM3 Smooth  Heartmate 3 Module Cable:  No yellow exposed and Attempted to unscrew modular cable to ensure it will be able to come lose in the event we ever need to change the modular cable while patient held the driveline in place so it would not move. Modular cable connection able to be unscrewed and re-tightened. Instructed pt to perform this weekly.    HCT:   Lab Results   Component Value Date    HCT 49.2 2020    HCT 30 (L) 2020       Complaints/reason for visit today: routine  Emergency Equipment With Patient: yes   VAD Binder With Patient: yes   Reviewed VAD Numbers In Binder: yes    Any Equipment Issues: None noted (Refer to  note for complete details)    DLES Assessment:  Appearance Of Driveline: 1 , suture removed today.          Antibiotics: NO  Velour: no  Manual & Visual Inspection Of Driveline: No kinks or tears noted  Stabilization Device In Use: yes, barton securement device      Patient MyChart Questionnaire: No flowsheet data found.     Assessment:   PAIN: NO  Complaints Of Nausea / Vomiting: None noted    Appearance and Frequency Of Stools: normal and formed without blood & daily  Color Of Urine:  "clear/yellow  Coping/Depression/Anxiety: coping okay  Sleep Habits: 6-7 hrs /night  Sleep Aids: melatonin  Showering: No - suture removed today, will hold off  Activity/Exercise: walking around   Driving: No.    DLES Dressing Care:   Frequency of Dressing Changes: daily & daily kit  - pt recently started doing own dressing and was "checked off" per pt mother  Pt In Need Of Management Kits?:yes -   2 Box of daily kit  It is medically necessary to have VAD management kits in order to prevent infection or to assist in the healing of an infected DLES.    Labs:    Chemistry        Component Value Date/Time     (L) 04/30/2020 1215     04/27/2020    K 3.9 04/30/2020 1215    K 3.8 04/27/2020    CL 98 04/30/2020 1215     04/27/2020    CO2 28 04/30/2020 1215    CO2 25 04/27/2020    BUN 12 04/30/2020 1215    BUN 12 04/27/2020    CREATININE 1.1 04/30/2020 1215    CREATININE 0.8 04/27/2020    GLU 85 04/30/2020 1215        Component Value Date/Time    CALCIUM 9.9 04/30/2020 1215    CALCIUM 9.5 04/27/2020    ALKPHOS 150 (H) 04/30/2020 1215    AST 22 04/30/2020 1215    AST 19 04/27/2020    ALT 20 04/30/2020 1215    ALT 16 04/27/2020    BILITOT 0.6 04/30/2020 1215    ESTGFRAFRICA >60.0 04/30/2020 1215    EGFRNONAA >60.0 04/30/2020 1215            Magnesium   Date Value Ref Range Status   04/30/2020 1.8 1.6 - 2.6 mg/dL Final       Lab Results   Component Value Date    WBC 6.86 04/30/2020    HGB 14.7 04/30/2020    HCT 49.2 04/30/2020    MCV 83 04/30/2020     04/30/2020       Lab Results   Component Value Date    INR 3.7 (H) 04/30/2020    INR 3.3 04/27/2020    INR 2.7 04/20/2020       BNP   Date Value Ref Range Status   04/30/2020 205 (H) 0 - 99 pg/mL Final     Comment:     Values of less than 100 pg/ml are consistent with non-CHF populations.   04/06/2020 257 (H) 0 - 99 pg/mL Final     Comment:     Values of less than 100 pg/ml are consistent with non-CHF populations.   03/19/2020 442 (H) 0 - 99 pg/mL Final    "  Comment:     Values of less than 100 pg/ml are consistent with non-CHF populations.       LD   Date Value Ref Range Status   04/30/2020 194 110 - 260 U/L Final     Comment:     Results are increased in hemolyzed samples.   03/19/2020 203 110 - 260 U/L Final     Comment:     Results are increased in hemolyzed samples.   03/12/2020 245 110 - 260 U/L Final     Comment:     Results are increased in hemolyzed samples.       Labs reviewed with patient: YES      Patient Satisfaction Survey completed per patient: No  (explained about signature and box to check)  Medication reconciliation: per MA.  Medication Detail updated today: yes  Coumadin Managed by: [facility]    Education: Reviewed driveline care, emergency procedures, how to change the controller, alarms with patient, as well as discussed how to page the VAD coordinator in case of an emergency.     Plans/Needs: PT doing very well without any major issues.  PT dressing irritation has resolved on its own.  Sutures removed today.  Pt asking to drive/shower - per Dr. Tafoya will plan to hold off until next visit.  Pt was given permission to fish today.  Pt also doing own dressing now.  No changes.  Pt to RTC in 1 month, please refer to MD note.      Hurricane Season: No

## 2020-04-30 NOTE — LETTER
April 30, 2020        Miko Catalan  [address] [place]  [place] [address]  Phone: [phone]  Fax: [phone]             [facility] [address] [place] [address]  Phone: [phone]   Patient: Saba Lott   MR Number: [id number]   YOB: 1965   Date of Visit: 4/30/2020       Dear Dr. Miko Catalan    Thank you for referring Saba Lott to me for evaluation. Attached you will find relevant portions of my assessment and plan of care.    If you have questions, please do not hesitate to call me. I look forward to following Saba Lott along with you.    Sincerely,    Jeanette Tafoya MD    Enclosure    If you would like to receive this communication electronically, please contact [email or web address] or [phone] to request Petsy Link access.    Petsy Link is a tool which provides read-only access to select patient information with whom you have a relationship. Its easy to use and provides real time access to review your patients record including encounter summaries, notes, results, and demographic information.    If you feel you have received this communication in error or would no longer like to receive these types of communications, please e-mail [email or web address]

## 2020-04-30 NOTE — PROCEDURES
TXP CHRISTY INTERROGATIONS 4/30/2020 3/19/2020 3/12/2020 3/5/2020 2/27/2020 2/27/2020 2/26/2020   Type HeartMate3 HeartMate3 HeartMate3 HeartMate3 - - -   Flow 4.0 4.1 4.2 4.1 - - -   Speed 5300 5300 5300 5300 - - -   PI 5.1 4.3 5.1 5.9 - - -   Power (Centeno) 4.1 4.0 4.0 4.0 - - -   LSL 4900 4900 4900 4900 - - -   Pulsatility Intermittent pulse No Pulse No Pulse - Intermittent pulse Intermittent pulse Intermittent pulse   }

## 2020-04-30 NOTE — PROGRESS NOTES
"Subjective:   Patient ID:  Saba Lott is a 54 y.o. male who presents for LVAD followup visit.    Implant Date: 2/6/2020     Heartmate 3 RPM 5300     INR goal: 2-3   Bridge with Heparin   Antiplatelets:      TXP CHRISTY INTERROGATIONS 4/30/2020   Type HeartMate3   Flow 4.0   Speed 5300   PI 5.1   Power (Centeno) 4.1   LSL 4900   Pulsatility Intermittent pulse       HPI   Mr. Lott is a very pleasant 53 year old AAM with DCM, HTN, tobacco and ETOH abuse (quit 2018), who presented to the hospital in January with ADHF/cardiogenic shock, was satrted on CRRT, had IABP placed, recovered from this from renal standpoint and labs, and proceeded with LVAD impalnt 02/06. Post op course was complicated by some RV failure requiring dobutamine, high dose diuretics, but was eventually weaned off successfully, with PICC and  weaned off as well. Patient had some post op thrombocytosis however heme onc consulted and felt it was reactive. Comes for his 4th clinic visit. Doing well. Has no complaints.    VAD speed is at 5300 rpm. No VAD alarms noted on interrogation occasional PI events. BP is 90 mm of Hg. DLES is a "1". INR is supratherapeutic at 3.7. LDH is at baseline.     Review of Systems   Constitution: Negative. Negative for chills, decreased appetite, diaphoresis, fever, malaise/fatigue, night sweats, weight gain and weight loss.   Eyes: Negative.    Cardiovascular: Negative for chest pain, claudication, cyanosis, dyspnea on exertion, irregular heartbeat, leg swelling, near-syncope, orthopnea, palpitations, paroxysmal nocturnal dyspnea and syncope.   Respiratory: Negative for cough, hemoptysis and shortness of breath.    Endocrine: Negative.    Hematologic/Lymphatic: Negative.    Skin: Negative for color change, dry skin and nail changes.   Musculoskeletal: Negative.    Gastrointestinal: Negative.    Genitourinary: Negative.    Neurological: Negative for weakness.       Objective:   Blood pressure (!) 90/0, pulse (!) 48, " "temperature 98.4 °F (36.9 °C), temperature source Oral, height 5' 7" (1.702 m), weight 80.7 kg (178 lb).body mass index is 27.88 kg/m².    Doppler: 90    Physical Exam   Constitutional: He appears well-developed.   BP (!) 90/0 (BP Location: Right arm, Patient Position: Sitting) Comment (BP Method): doppler  Pulse (!) 48   Temp 98.4 °F (36.9 °C) (Oral)   Ht 5' 7" (1.702 m)   Wt 80.7 kg (178 lb)   BMI 27.88 kg/m²      HENT:   Head: Normocephalic.   Neck: No JVD present. Carotid bruit is not present.   Cardiovascular: Regular rhythm and normal heart sounds.   No murmur heard.  Smooth VAD hum. DLES is "1"   Pulmonary/Chest: Effort normal and breath sounds normal. No respiratory distress. He has no wheezes. He has no rales.   Abdominal: Soft. Bowel sounds are normal. He exhibits no distension. There is no tenderness. There is no rebound.   Musculoskeletal: He exhibits no edema.   Neurological: He is alert.   Skin: Skin is warm.   Vitals reviewed.      Lab Results   Component Value Date    WBC 6.86 04/30/2020    HGB 14.7 04/30/2020    HCT 49.2 04/30/2020    MCV 83 04/30/2020     04/30/2020    CO2 28 04/30/2020    CREATININE 1.1 04/30/2020    CALCIUM 9.9 04/30/2020    ALBUMIN 4.3 04/30/2020    AST 22 04/30/2020     (H) 04/30/2020    ALT 20 04/30/2020     03/19/2020       Lab Results   Component Value Date    INR 3.7 (H) 04/30/2020    INR 3.3 04/27/2020    INR 2.7 04/20/2020       BNP   Date Value Ref Range Status   04/30/2020 205 (H) 0 - 99 pg/mL Final     Comment:     Values of less than 100 pg/ml are consistent with non-CHF populations.   04/06/2020 257 (H) 0 - 99 pg/mL Final     Comment:     Values of less than 100 pg/ml are consistent with non-CHF populations.   03/19/2020 442 (H) 0 - 99 pg/mL Final     Comment:     Values of less than 100 pg/ml are consistent with non-CHF populations.       LD   Date Value Ref Range Status   03/19/2020 203 110 - 260 U/L Final     Comment:     Results are " increased in hemolyzed samples.   03/12/2020 245 110 - 260 U/L Final     Comment:     Results are increased in hemolyzed samples.   03/12/2020 422 (H) 110 - 260 U/L Final     Comment:     Results are increased in hemolyzed samples.  *Result may be interfered by visible hemolysis         Assessment:      1. Heart replaced by heart assist device    2. Chronic combined systolic and diastolic congestive heart failure    3. Non-ischemic cardiomyopathy    4. AICD (automatic cardioverter/defibrillator) present        Plan:   Patient is now NYHA class II. BP is controlled.  INR is supratherapeutic. Coumadin clinic to adjust his dose.   VAD interrogation was performed in clinic  Any VAD management kits dispensed today medically necessary  Recommend 2 gram sodium restriction and 1500cc fluid restriction.  Encourage physical activity with graded exercise program.  Requested patient to weigh themselves daily, and to notify us if their weight increases by more than 3 lbs in 1 day or 5 lbs in 1 week.   RTC in 1 month     Listed for transplant: No. Implanted as DT    UNOS Patient Status  Functional Status: 90% - Able to carry on normal activity: minor symptoms of disease  Physical Capacity: No Limitations  Working for Income: Unknown    Jeanette Tafoya MD

## 2020-04-30 NOTE — PROGRESS NOTES
INR not at goal. Medications, chart, and patient findings reviewed. See calendar for adjustments to dose and follow up plan.  Hold x 1 tonight and continue dose. Will not lower overall dose yet as he has bottomed out previously.  Confirms the correct dose.  Recheck on Monday 5/4/20.

## 2020-05-04 ENCOUNTER — ANTI-COAG VISIT (OUTPATIENT)
Dept: CARDIOLOGY | Facility: CLINIC | Age: 55
End: 2020-05-04
Payer: COMMERCIAL

## 2020-05-04 ENCOUNTER — DOCUMENTATION ONLY (OUTPATIENT)
Dept: TRANSPLANT | Facility: CLINIC | Age: 55
End: 2020-05-04

## 2020-05-04 DIAGNOSIS — Z79.01 LONG TERM (CURRENT) USE OF ANTICOAGULANTS: Primary | ICD-10-CM

## 2020-05-04 DIAGNOSIS — Z95.811 LVAD (LEFT VENTRICULAR ASSIST DEVICE) PRESENT: ICD-10-CM

## 2020-05-04 LAB
ALBUMIN: 4
ALP ISOS SERPL LEV INH-CCNC: 144 U/L
ALT: 18
AST: 19
BNP (B-TYPE NATRIURETIC PEP): 162
BUN BLD-MCNC: 19 MG/DL (ref 4–21)
CALCIUM SERPL-MCNC: 9.3 MG/DL
CREAT SERPL-MCNC: 0.9 MG/DL
GLUCOSE: 91
HCT VFR BLD AUTO: 43 % (ref 41–53)
HGB BLD-MCNC: 13.1 G/DL (ref 13.5–17.5)
INR PPP: 1.8
MAGNESIUM SERPL-MCNC: 1.9 MG/DL (ref 1.6–2.4)
PLATELET COUNT: 340
POTASSIUM: 3.7
PROTEIN: 7.9
SODIUM: 140
WBC # BLD AUTO: 8.6 10^3/ML

## 2020-05-04 PROCEDURE — 93793 ANTICOAG MGMT PT WARFARIN: CPT | Mod: S$GLB,,,

## 2020-05-04 PROCEDURE — 93793 PR ANTICOAGULANT MGMT FOR PT TAKING WARFARIN: ICD-10-PCS | Mod: S$GLB,,,

## 2020-05-04 NOTE — PROGRESS NOTES
INR not at goal. Medications, chart, and patient findings reviewed. See calendar for adjustments to dose and follow up plan.  Confirms correct dose, difficult to dose with 7.5 mg tablets, consider dose change/tablet change to 5 mg if Thursday's dose is not in range.  Asked him just to resume dose for now, recheck on Thursday.

## 2020-05-07 NOTE — PROGRESS NOTES
Catrachita/[phone])/[facility]) called 05/07/20 HH nurse went out to draw there was not enough (Blood). HH will go on (5/08/20) (Fingerstick) Okay Per Lynn.

## 2020-05-08 NOTE — PROGRESS NOTES
Sharron with [facility] called to report nurse is unable to draw labs today due to no authorization from his Insurance--not sure when they will be authorized to go to the Patient's home, Patient's INR is due today 5/08

## 2020-05-08 NOTE — PROGRESS NOTES
Contacted patient to find out which lab he could go to locally for INR/lab work.  LMV for callback.  Pt reports that he will go to [facility] in [place] for lab work until the insurance information for  is sorted out.  He agrees to attend lab on Monday 5/11/20 for an INR follow up.

## 2020-05-14 ENCOUNTER — TELEPHONE (OUTPATIENT)
Dept: TRANSPLANT | Facility: CLINIC | Age: 55
End: 2020-05-14

## 2020-05-14 NOTE — PROGRESS NOTES
"Note received from Cindy WILD RN-Pt called to request orders for labs at [facility], documented in pt chart that he has home health.  Called and spoke to pt, he reports there was " a mix up".  Initially home health stopped as they instructed pt he lost his insurance.  Pt reports he spoke with insurance and "it was reinstated".  Pt reports he spoke with home health company and "everything is good to go, they are coming next week".   Pt encouraged to call VAD coordinator with any questions problems or concerns. Pt reminded to page VAD coordinator on call for emergencies.  Pt verbalized understanding and in agreement of plan.   I have called [facility].  They report to me again that he cannot use his HH benefit until 6/1/20.  He now reports to me that he can go to lab on 5/15/20 at [facility] for an INR.  I have messaged LVAD to elt them know as well.  [facility] - [phone]; [phone] (f)  INR order faxed to lab 5/14/20    "

## 2020-05-14 NOTE — TELEPHONE ENCOUNTER
"Pt called to request orders for labs at [facility], documented in pt chart that he has home health.  Called and spoke to pt, he reports there was " a mix up".  Initially home health stopped as they instructed pt he lost his insurance.  Pt reports he spoke with insurance and "it was reinstated".  Pt reports he spoke with home health company and "everything is good to go, they are coming next week".   Pt encouraged to call VAD coordinator with any questions problems or concerns. Pt reminded to page VAD coordinator on call for emergencies.  Pt verbalized understanding and in agreement of plan.     "

## 2020-05-15 ENCOUNTER — ANTI-COAG VISIT (OUTPATIENT)
Dept: CARDIOLOGY | Facility: CLINIC | Age: 55
End: 2020-05-15
Payer: COMMERCIAL

## 2020-05-15 DIAGNOSIS — Z95.811 LVAD (LEFT VENTRICULAR ASSIST DEVICE) PRESENT: ICD-10-CM

## 2020-05-15 LAB
ANION GAP SERPL CALC-SCNC: 14 MMOL/L (ref ?–30)
BNP: 256
BUN BLD-MCNC: 16 MG/DL (ref 4–21)
CALCIUM SERPL-MCNC: 9.5 MG/DL
CARBON DIOXIDE, CO2: 29
CHLORIDE: 101
CREAT SERPL-MCNC: 0.8 MG/DL
GLUCOSE: 97
INR PPP: 1.81
MAGNESIUM SERPL-MCNC: 1.9 MG/DL (ref 1.6–2.4)
PHOSPHATE FLD-MCNC: 4 MG/DL (ref 2.5–4.9)
POTASSIUM: 3.7
SODIUM: 140

## 2020-05-15 PROCEDURE — 93793 PR ANTICOAGULANT MGMT FOR PT TAKING WARFARIN: ICD-10-PCS | Mod: S$GLB,,,

## 2020-05-15 PROCEDURE — 93793 ANTICOAG MGMT PT WARFARIN: CPT | Mod: S$GLB,,,

## 2020-05-15 NOTE — PROGRESS NOTES
INR not at goal. Medications, chart, and patient findings reviewed. See calendar for adjustments to dose and follow up plan.  Confirms dose, wife reports liver intake.  We will boost today and recheck on Monday.  Dosing for the week and weekend reviewed with Mr Lott.

## 2020-05-18 ENCOUNTER — TELEPHONE (OUTPATIENT)
Dept: TRANSPLANT | Facility: CLINIC | Age: 55
End: 2020-05-18

## 2020-05-20 ENCOUNTER — ANTI-COAG VISIT (OUTPATIENT)
Dept: CARDIOLOGY | Facility: CLINIC | Age: 55
End: 2020-05-20

## 2020-05-20 DIAGNOSIS — Z95.811 LVAD (LEFT VENTRICULAR ASSIST DEVICE) PRESENT: ICD-10-CM

## 2020-05-20 LAB — INR PPP: 1.81

## 2020-05-20 NOTE — PROGRESS NOTES
Called Patient to check if he had lab drawn 5/18 and where it was drawn since [facility] reported he did not go to their lab, Patient reports he could not get to the lab on Monday, states will go tomorrow 5/21/20

## 2020-05-22 ENCOUNTER — ANTI-COAG VISIT (OUTPATIENT)
Dept: CARDIOLOGY | Facility: CLINIC | Age: 55
End: 2020-05-22
Payer: COMMERCIAL

## 2020-05-22 DIAGNOSIS — Z95.811 LVAD (LEFT VENTRICULAR ASSIST DEVICE) PRESENT: ICD-10-CM

## 2020-05-22 LAB — INR PPP: 2.32

## 2020-05-22 PROCEDURE — 93793 ANTICOAG MGMT PT WARFARIN: CPT | Mod: S$GLB,,,

## 2020-05-22 PROCEDURE — 93793 PR ANTICOAGULANT MGMT FOR PT TAKING WARFARIN: ICD-10-PCS | Mod: S$GLB,,,

## 2020-05-25 DIAGNOSIS — I50.42 CHRONIC COMBINED SYSTOLIC AND DIASTOLIC CONGESTIVE HEART FAILURE: ICD-10-CM

## 2020-05-25 RX ORDER — ATORVASTATIN CALCIUM 20 MG/1
20 TABLET, FILM COATED ORAL DAILY
Qty: 90 TABLET | Refills: 3 | Status: SHIPPED | OUTPATIENT
Start: 2020-05-25 | End: 2020-11-28 | Stop reason: SDUPTHER

## 2020-05-25 RX ORDER — LISINOPRIL 5 MG/1
5 TABLET ORAL DAILY
Qty: 90 TABLET | Refills: 3 | Status: SHIPPED | OUTPATIENT
Start: 2020-05-25 | End: 2020-11-30 | Stop reason: SDUPTHER

## 2020-05-25 RX ORDER — WARFARIN 7.5 MG/1
7.5 TABLET ORAL DAILY
Qty: 30 TABLET | Refills: 11 | Status: SHIPPED | OUTPATIENT
Start: 2020-05-25 | End: 2020-11-30 | Stop reason: SDUPTHER

## 2020-05-25 RX ORDER — HYDRALAZINE HYDROCHLORIDE 100 MG/1
100 TABLET, FILM COATED ORAL EVERY 8 HOURS
Qty: 270 TABLET | Refills: 3 | Status: SHIPPED | OUTPATIENT
Start: 2020-05-25 | End: 2020-11-30 | Stop reason: SDUPTHER

## 2020-05-25 RX ORDER — BUMETANIDE 1 MG/1
1 TABLET ORAL 2 TIMES DAILY
Qty: 180 TABLET | Refills: 3 | Status: SHIPPED | OUTPATIENT
Start: 2020-05-25 | End: 2020-11-30 | Stop reason: SDUPTHER

## 2020-05-25 RX ORDER — SPIRONOLACTONE 25 MG/1
25 TABLET ORAL DAILY
Qty: 90 TABLET | Refills: 3 | Status: SHIPPED | OUTPATIENT
Start: 2020-05-25 | End: 2020-11-28 | Stop reason: SDUPTHER

## 2020-05-25 RX ORDER — ASPIRIN 325 MG
325 TABLET ORAL DAILY
Qty: 90 TABLET | Refills: 3 | Status: SHIPPED | OUTPATIENT
Start: 2020-05-25 | End: 2020-11-28 | Stop reason: SDUPTHER

## 2020-05-25 RX ORDER — DOCUSATE SODIUM 100 MG/1
100 CAPSULE, LIQUID FILLED ORAL 2 TIMES DAILY PRN
Qty: 60 CAPSULE | Refills: 2 | Status: SHIPPED | OUTPATIENT
Start: 2020-05-25 | End: 2020-09-03

## 2020-05-25 RX ORDER — AMLODIPINE BESYLATE 10 MG/1
10 TABLET ORAL DAILY
Qty: 90 TABLET | Refills: 3 | Status: SHIPPED | OUTPATIENT
Start: 2020-05-25 | End: 2020-11-28 | Stop reason: SDUPTHER

## 2020-05-25 NOTE — TELEPHONE ENCOUNTER
----- Message from Kelsey Prabhakar sent at 5/25/2020  1:18 PM CDT -----  Contact: pt  Pt Is Calling for Refills On The Following Medications     amLODIPine (NORVASC) 10 MG tablet 30 tablet 11 3/12/2020 3/12/2021 No  Sig - Route: Take 1 tablet (10 mg total) by mouth once daily. - Oral  Sent to pharmacy as: amLODIPine (NORVASC) 10 MG tablet  Class: Normal    aspirin 325 MG tablet 30 tablet 11 3/12/2020  No  Sig - Route: Take 1 tablet (325 mg total) by mouth once daily. - Oral  Sent to pharmacy as: aspirin 325 MG tablet    atorvastatin (LIPITOR) 20 MG tablet 30 tablet 11 3/12/2020  No  Sig - Route: Take 1 tablet (20 mg total) by mouth once daily. - Oral  Sent to pharmacy as: atorvastatin (LIPITOR) 20 MG tablet  Class: Normal    bumetanide (BUMEX) 1 MG tablet 60 tablet 11 3/12/2020 3/12/2021 No  Sig - Route: Take 1 tablet (1 mg total) by mouth 2 (two) times daily. - Oral  Sent to pharmacy as: bumetanide (BUMEX) 1 MG tablet  Class: Normal    docusate sodium (COLACE) 100 MG capsule  0 3/12/2020  No  Sig - Route: Take 1 capsule (100 mg total) by mouth 2 (two) times daily as needed for Constipation. - Oral    hydrALAZINE (APRESOLINE) 100 MG tablet 90 tablet 11 3/12/2020 3/12/2021 No  Sig - Route: Take 1 tablet (100 mg total) by mouth every 8 (eight) hours. - Oral  Sent to pharmacy as: hydrALAZINE (APRESOLINE) 100 MG tablet  Class: Normal    lisinopriL (PRINIVIL,ZESTRIL) 5 MG tablet 90 tablet 3 3/12/2020 3/12/2021 --  Sig - Route: Take 1 tablet (5 mg total) by mouth once daily. - Oral  Sent to pharmacy as: lisinopriL (PRINIVIL,ZESTRIL) 5 MG tablet  Class: Normal    spironolactone (ALDACTONE) 25 MG tablet 30 tablet 1 3/12/2020  No  Sig - Route: Take 1 tablet (25 mg total) by mouth once daily. - Oral  Sent to pharmacy as: spironolactone (ALDACTONE) 25 MG tablet  Class: Normal    warfarin (COUMADIN) 7.5 MG tablet 30 tablet 11 3/12/2020 3/12/2021 No  Sig - Route: Take 1 tablet (7.5 mg total) by mouth Daily. - Oral  Sent to  pharmacy as: warfarin (COUMADIN) 7.5 MG tablet  Class: Normal    Called Into [facility]  Pharmacy phone # [phone]      Pt Can Be Reached At [phone]

## 2020-05-28 ENCOUNTER — ANTI-COAG VISIT (OUTPATIENT)
Dept: CARDIOLOGY | Facility: CLINIC | Age: 55
End: 2020-05-28
Payer: MEDICAID

## 2020-05-28 ENCOUNTER — LAB VISIT (OUTPATIENT)
Dept: LAB | Facility: HOSPITAL | Age: 55
End: 2020-05-28
Attending: INTERNAL MEDICINE
Payer: COMMERCIAL

## 2020-05-28 ENCOUNTER — OFFICE VISIT (OUTPATIENT)
Dept: TRANSPLANT | Facility: CLINIC | Age: 55
End: 2020-05-28
Payer: COMMERCIAL

## 2020-05-28 ENCOUNTER — CLINICAL SUPPORT (OUTPATIENT)
Dept: TRANSPLANT | Facility: CLINIC | Age: 55
End: 2020-05-28
Payer: COMMERCIAL

## 2020-05-28 VITALS
HEIGHT: 67 IN | HEART RATE: 50 BPM | BODY MASS INDEX: 28.88 KG/M2 | WEIGHT: 184 LBS | SYSTOLIC BLOOD PRESSURE: 90 MMHG | TEMPERATURE: 98 F

## 2020-05-28 DIAGNOSIS — I50.42 CHRONIC COMBINED SYSTOLIC AND DIASTOLIC CONGESTIVE HEART FAILURE: ICD-10-CM

## 2020-05-28 DIAGNOSIS — Z95.810 AICD (AUTOMATIC CARDIOVERTER/DEFIBRILLATOR) PRESENT: ICD-10-CM

## 2020-05-28 DIAGNOSIS — I42.8 NON-ISCHEMIC CARDIOMYOPATHY: ICD-10-CM

## 2020-05-28 DIAGNOSIS — Z95.811 HEART REPLACED BY HEART ASSIST DEVICE: ICD-10-CM

## 2020-05-28 DIAGNOSIS — Z95.811 HEART REPLACED BY HEART ASSIST DEVICE: Primary | ICD-10-CM

## 2020-05-28 DIAGNOSIS — Z95.811 LVAD (LEFT VENTRICULAR ASSIST DEVICE) PRESENT: Primary | ICD-10-CM

## 2020-05-28 DIAGNOSIS — Z79.01 LONG TERM (CURRENT) USE OF ANTICOAGULANTS: ICD-10-CM

## 2020-05-28 LAB
ALBUMIN SERPL BCP-MCNC: 4.1 G/DL (ref 3.5–5.2)
ALP SERPL-CCNC: 130 U/L (ref 55–135)
ALT SERPL W/O P-5'-P-CCNC: 16 U/L (ref 10–44)
ANION GAP SERPL CALC-SCNC: 7 MMOL/L (ref 8–16)
AST SERPL-CCNC: 18 U/L (ref 10–40)
BASOPHILS # BLD AUTO: 0.02 K/UL (ref 0–0.2)
BASOPHILS NFR BLD: 0.1 % (ref 0–1.9)
BILIRUB DIRECT SERPL-MCNC: 0.5 MG/DL (ref 0.1–0.3)
BILIRUB SERPL-MCNC: 1.1 MG/DL (ref 0.1–1)
BNP SERPL-MCNC: 301 PG/ML (ref 0–99)
BUN SERPL-MCNC: 14 MG/DL (ref 6–20)
CALCIUM SERPL-MCNC: 9.6 MG/DL (ref 8.7–10.5)
CHLORIDE SERPL-SCNC: 103 MMOL/L (ref 95–110)
CO2 SERPL-SCNC: 26 MMOL/L (ref 23–29)
CREAT SERPL-MCNC: 1 MG/DL (ref 0.5–1.4)
CRP SERPL-MCNC: 81.2 MG/L (ref 0–8.2)
DIFFERENTIAL METHOD: ABNORMAL
EOSINOPHIL # BLD AUTO: 0.1 K/UL (ref 0–0.5)
EOSINOPHIL NFR BLD: 0.3 % (ref 0–8)
ERYTHROCYTE [DISTWIDTH] IN BLOOD BY AUTOMATED COUNT: 17.8 % (ref 11.5–14.5)
EST. GFR  (AFRICAN AMERICAN): >60 ML/MIN/1.73 M^2
EST. GFR  (NON AFRICAN AMERICAN): >60 ML/MIN/1.73 M^2
GLUCOSE SERPL-MCNC: 95 MG/DL (ref 70–110)
HCT VFR BLD AUTO: 44.6 % (ref 40–54)
HGB BLD-MCNC: 13.5 G/DL (ref 14–18)
IMM GRANULOCYTES # BLD AUTO: 0.07 K/UL (ref 0–0.04)
IMM GRANULOCYTES NFR BLD AUTO: 0.5 % (ref 0–0.5)
INR PPP: 1.9 (ref 0.8–1.2)
LDH SERPL L TO P-CCNC: 183 U/L (ref 110–260)
LYMPHOCYTES # BLD AUTO: 1.4 K/UL (ref 1–4.8)
LYMPHOCYTES NFR BLD: 9.3 % (ref 18–48)
MAGNESIUM SERPL-MCNC: 2.2 MG/DL (ref 1.6–2.6)
MCH RBC QN AUTO: 25 PG (ref 27–31)
MCHC RBC AUTO-ENTMCNC: 30.3 G/DL (ref 32–36)
MCV RBC AUTO: 83 FL (ref 82–98)
MONOCYTES # BLD AUTO: 0.8 K/UL (ref 0.3–1)
MONOCYTES NFR BLD: 5.7 % (ref 4–15)
NEUTROPHILS # BLD AUTO: 12.3 K/UL (ref 1.8–7.7)
NEUTROPHILS NFR BLD: 84.1 % (ref 38–73)
NRBC BLD-RTO: 0 /100 WBC
PHOSPHATE SERPL-MCNC: 3.4 MG/DL (ref 2.7–4.5)
PLATELET # BLD AUTO: 325 K/UL (ref 150–350)
PMV BLD AUTO: 9.8 FL (ref 9.2–12.9)
POTASSIUM SERPL-SCNC: 4.3 MMOL/L (ref 3.5–5.1)
PREALB SERPL-MCNC: 22 MG/DL (ref 20–43)
PROT SERPL-MCNC: 8.6 G/DL (ref 6–8.4)
PROTHROMBIN TIME: 18.2 SEC (ref 9–12.5)
RBC # BLD AUTO: 5.39 M/UL (ref 4.6–6.2)
SODIUM SERPL-SCNC: 136 MMOL/L (ref 136–145)
WBC # BLD AUTO: 14.69 K/UL (ref 3.9–12.7)

## 2020-05-28 PROCEDURE — 83615 LACTATE (LD) (LDH) ENZYME: CPT

## 2020-05-28 PROCEDURE — 83880 ASSAY OF NATRIURETIC PEPTIDE: CPT

## 2020-05-28 PROCEDURE — 99214 OFFICE O/P EST MOD 30 MIN: CPT | Mod: S$GLB,,, | Performed by: INTERNAL MEDICINE

## 2020-05-28 PROCEDURE — 99999 PR PBB SHADOW E&M-EST. PATIENT-LVL III: ICD-10-PCS | Mod: PBBFAC,,, | Performed by: INTERNAL MEDICINE

## 2020-05-28 PROCEDURE — 93750 OP LVAD INTERROGATION: ICD-10-PCS | Mod: S$GLB,,, | Performed by: INTERNAL MEDICINE

## 2020-05-28 PROCEDURE — 93750 INTERROGATION VAD IN PERSON: CPT | Mod: S$GLB,,, | Performed by: INTERNAL MEDICINE

## 2020-05-28 PROCEDURE — 99214 PR OFFICE/OUTPT VISIT, EST, LEVL IV, 30-39 MIN: ICD-10-PCS | Mod: S$GLB,,, | Performed by: INTERNAL MEDICINE

## 2020-05-28 PROCEDURE — 83735 ASSAY OF MAGNESIUM: CPT

## 2020-05-28 PROCEDURE — 85025 COMPLETE CBC W/AUTO DIFF WBC: CPT

## 2020-05-28 PROCEDURE — 36415 COLL VENOUS BLD VENIPUNCTURE: CPT

## 2020-05-28 PROCEDURE — 80053 COMPREHEN METABOLIC PANEL: CPT

## 2020-05-28 PROCEDURE — 85610 PROTHROMBIN TIME: CPT

## 2020-05-28 PROCEDURE — 86140 C-REACTIVE PROTEIN: CPT

## 2020-05-28 PROCEDURE — 84134 ASSAY OF PREALBUMIN: CPT

## 2020-05-28 PROCEDURE — 82248 BILIRUBIN DIRECT: CPT

## 2020-05-28 PROCEDURE — 99999 PR PBB SHADOW E&M-EST. PATIENT-LVL III: CPT | Mod: PBBFAC,,, | Performed by: INTERNAL MEDICINE

## 2020-05-28 PROCEDURE — 84100 ASSAY OF PHOSPHORUS: CPT

## 2020-05-28 NOTE — PROGRESS NOTES
"Subjective:   Patient ID:  Saba Lott is a 54 y.o. male who presents for LVAD followup visit.    Implant Date: 2/6/2020     Heartmate 3 RPM 5300     INR goal: 2-3   Bridge with Heparin   Antiplatelets:      TXP CHRISTY INTERROGATIONS 5/28/2020   Type HeartMate3   Flow 3.8   Speed 5300   PI 5.5   Power (Centeno) 4.1   LSL 4900   Pulsatility Intermittent pulse       HPI   Mr. Lott is a very pleasant 53 year old AAM with DCM, HTN, tobacco and ETOH abuse (quit 2018), who presented to the hospital in January with ADHF/cardiogenic shock, was satrted on CRRT, had IABP placed, recovered from this from renal standpoint and labs, and proceeded with LVAD impalnt 02/06. Post op course was complicated by some RV failure requiring dobutamine, high dose diuretics, but was eventually weaned off successfully, with PICC and  weaned off as well. Patient had some post op thrombocytosis however heme onc consulted and felt it was reactive. Comes for his regular clinic visit. Doing well. Has no complaints.  VAD speed is at 5300 rpm. No VAD alarms noted on interrogation occasional PI events. BP is 90. DLES is a "1". INR is slightly subtherapeutic at 1.9. LDH is at baseline.     Review of Systems   Constitution: Negative. Negative for chills, decreased appetite, diaphoresis, fever, malaise/fatigue, night sweats, weight gain and weight loss.   Eyes: Negative.    Cardiovascular: Negative for chest pain, claudication, cyanosis, dyspnea on exertion, irregular heartbeat, leg swelling, near-syncope, orthopnea, palpitations, paroxysmal nocturnal dyspnea and syncope.   Respiratory: Negative for cough, hemoptysis and shortness of breath.    Endocrine: Negative.    Hematologic/Lymphatic: Negative.    Skin: Negative for color change, dry skin and nail changes.   Musculoskeletal: Negative.    Gastrointestinal: Negative.    Genitourinary: Negative.    Neurological: Negative for weakness.       Objective:   Blood pressure (!) 90/0, pulse (!) 50, " "temperature 97.9 °F (36.6 °C), temperature source Oral, height 5' 7" (1.702 m), weight 83.5 kg (184 lb).body mass index is 28.82 kg/m².    Doppler: 90    Physical Exam   Constitutional: He appears well-developed.   BP (!) 90/0 (BP Location: Left arm, Patient Position: Sitting) Comment (BP Method): doppler  Pulse (!) 50   Temp 97.9 °F (36.6 °C) (Oral)   Ht 5' 7" (1.702 m)   Wt 83.5 kg (184 lb)   BMI 28.82 kg/m²      HENT:   Head: Normocephalic.   Neck: No JVD present. Carotid bruit is not present.   Cardiovascular: Regular rhythm and normal heart sounds.   No murmur heard.  Smooth VAD hum. DLES is "1"   Pulmonary/Chest: Effort normal and breath sounds normal. No respiratory distress. He has no wheezes. He has no rales.   Abdominal: Soft. Bowel sounds are normal. He exhibits no distension. There is no tenderness. There is no rebound.   Musculoskeletal: He exhibits no edema.   Neurological: He is alert.   Skin: Skin is warm.   Vitals reviewed.      Lab Results   Component Value Date    WBC 14.69 (H) 05/28/2020    HGB 13.5 (L) 05/28/2020    HCT 44.6 05/28/2020    MCV 83 05/28/2020     05/28/2020    CO2 26 05/28/2020    CREATININE 1.0 05/28/2020    CALCIUM 9.6 05/28/2020    ALKALINEPHOS 144 05/04/2020    ALBUMIN 4.1 05/28/2020    AST 18 05/28/2020     (H) 05/28/2020    ALT 16 05/28/2020     05/28/2020       Lab Results   Component Value Date    INR 1.9 (H) 05/28/2020    INR 2.32 05/22/2020    INR 1.81 05/15/2020    INR 1.81 05/15/2020       BNP   Date Value Ref Range Status   05/28/2020 301 (H) 0 - 99 pg/mL Final     Comment:     Values of less than 100 pg/ml are consistent with non-CHF populations.   04/30/2020 205 (H) 0 - 99 pg/mL Final     Comment:     Values of less than 100 pg/ml are consistent with non-CHF populations.   04/06/2020 257 (H) 0 - 99 pg/mL Final     Comment:     Values of less than 100 pg/ml are consistent with non-CHF populations.       LD   Date Value Ref Range Status "   05/28/2020 183 110 - 260 U/L Final     Comment:     Results are increased in hemolyzed samples.   04/30/2020 194 110 - 260 U/L Final     Comment:     Results are increased in hemolyzed samples.   03/19/2020 203 110 - 260 U/L Final     Comment:     Results are increased in hemolyzed samples.       Assessment:      1. Heart replaced by heart assist device    2. Non-ischemic cardiomyopathy    3. Chronic combined systolic and diastolic congestive heart failure    4. AICD (automatic cardioverter/defibrillator) present        Plan:   Patient is now NYHA class II. BP is controlled.  INR is slightly subtherapeutic. Coumadin clinic to adjust his dose.   Leukocytosis noted.  VAD interrogation was performed in clinic  Any VAD management kits dispensed today medically necessary  Recommend 2 gram sodium restriction and 1500cc fluid restriction.  Encourage physical activity with graded exercise program.  Requested patient to weigh themselves daily, and to notify us if their weight increases by more than 3 lbs in 1 day or 5 lbs in 1 week.   RTC in 1 month     Listed for transplant: No. Implanted as DT.    UNOS Patient Status  Functional Status: 80% - Normal activity with effort: some symptoms of disease  Physical Capacity: No Limitations  Working for Income: Unknown    Jeanette Tafoya MD

## 2020-05-28 NOTE — PROGRESS NOTES
Date of Implant with Heartmate 3 LVAD: 20    PATIENT ARRIVED IN CLINIC:  Ambulatory   Accompanied by: n/a - sister on speakerphone with Glory    Vitals  Temperature, oral:   Temp Readings from Last 1 Encounters:   20 97.9 °F (36.6 °C) (Oral)     Blood Pressure:   BP Readings from Last 3 Encounters:   20 (!) 90/0   20 (!) 90/0   20 (!) 80/0        VAD Interrogation:  TXP CHRISTY INTERROGATIONS 2020 2020 3/19/2020   Type HeartMate3 HeartMate3 HeartMate3   Flow 3.8 4.0 4.1   Speed 5300 5300 5300   PI 5.5 5.1 4.3   Power (Centeno) 4.1 4.1 4.0   LSL 4900 4900 4900   Pulsatility Intermittent pulse Intermittent pulse No Pulse       History Lo.c3e  Problems / Issues / Alarms with VAD if any: None noted  VAD Sounds: HM3 Smooth  Heartmate 3 Module Cable:  No yellow exposed and Attempted to unscrew modular cable to ensure it will be able to come lose in the event we ever need to change the modular cable while patient held the driveline in place so it would not move. Modular cable connection able to be unscrewed and re-tightened. Instructed pt to perform this weekly.    HCT:   Lab Results   Component Value Date    HCT 44.6 2020    HCT 30 (L) 2020       Complaints/reason for visit today: routine  Emergency Equipment With Patient: yes   VAD Binder With Patient: yes   Reviewed VAD Numbers In Binder: yes    Any Equipment Issues: None noted (Refer to  note for complete details)    DLES Assessment:  Appearance Of Driveline: 1  Antibiotics: NO  Velour: no  Manual & Visual Inspection Of Driveline: No kinks or tears noted  Stabilization Device In Use: yes, barton securement device      Patient MyChart Questionnaire: No flowsheet data found.     Assessment:   PAIN: NO  Complaints Of Nausea / Vomiting: None noted    Appearance and Frequency Of Stools: normal and formed without blood & daily  Color Of Urine: clear/yellow  Coping/Depression/Anxiety: coping okay  Sleep  Habits: 8 hrs /night  Sleep Aids: None noted  Showering: No  Activity/Exercise: pt reports walking daiyl   Driving: No.    DLES Dressing Care:   Frequency of Dressing Changes: daily & daily kit  Pt In Need Of Management Kits?:yes -   1 Box of daily kit  It is medically necessary to have VAD management kits in order to prevent infection or to assist in the healing of an infected DLES.    Labs:    Chemistry        Component Value Date/Time     05/28/2020 1346     05/15/2020    K 4.3 05/28/2020 1346    K 3.7 05/15/2020     05/28/2020 1346     05/15/2020    CO2 26 05/28/2020 1346    CO2 29 05/15/2020    BUN 14 05/28/2020 1346    BUN 16 05/15/2020    CREATININE 1.0 05/28/2020 1346    CREATININE 0.8 05/15/2020    GLU 95 05/28/2020 1346        Component Value Date/Time    CALCIUM 9.6 05/28/2020 1346    CALCIUM 9.5 05/15/2020    ALKPHOS 130 05/28/2020 1346    AST 18 05/28/2020 1346    AST 19 04/27/2020    ALT 16 05/28/2020 1346    ALT 18 05/04/2020    ALT 16 04/27/2020    BILITOT 1.1 (H) 05/28/2020 1346    ESTGFRAFRICA >60.0 05/28/2020 1346    EGFRNONAA >60.0 05/28/2020 1346            Magnesium   Date Value Ref Range Status   05/28/2020 2.2 1.6 - 2.6 mg/dL Final       Lab Results   Component Value Date    WBC 14.69 (H) 05/28/2020    HGB 13.5 (L) 05/28/2020    HCT 44.6 05/28/2020    MCV 83 05/28/2020     05/28/2020       Lab Results   Component Value Date    INR 1.9 (H) 05/28/2020    INR 2.32 05/22/2020    INR 1.81 05/15/2020    INR 1.81 05/15/2020       BNP   Date Value Ref Range Status   05/28/2020 301 (H) 0 - 99 pg/mL Final     Comment:     Values of less than 100 pg/ml are consistent with non-CHF populations.   04/30/2020 205 (H) 0 - 99 pg/mL Final     Comment:     Values of less than 100 pg/ml are consistent with non-CHF populations.   04/06/2020 257 (H) 0 - 99 pg/mL Final     Comment:     Values of less than 100 pg/ml are consistent with non-CHF populations.       LD   Date Value Ref  Range Status   05/28/2020 183 110 - 260 U/L Final     Comment:     Results are increased in hemolyzed samples.   04/30/2020 194 110 - 260 U/L Final     Comment:     Results are increased in hemolyzed samples.   03/19/2020 203 110 - 260 U/L Final     Comment:     Results are increased in hemolyzed samples.       Labs reviewed with patient: YES      Patient Satisfaction Survey completed per patient: No  (explained about signature and box to check)  Medication reconciliation: per MA.  Medication Detail updated today: yes  Coumadin Managed by: [facility]    Education: Reviewed driveline care, emergency procedures, how to change the controller, alarms with patient, as well as discussed how to page the VAD coordinator in case of an emergency.   Coved - 19 education: Reminded patient/caregiver to check temperature daily and call us if it is > 99.0.  Reminded them  to stay 6 feet away from other people, wash hands frequently, don't touch your face and stay home except yo get labs, medications, and appts.      Plans/Needs: pt doing very well with no major issues.  Pt had not been taking lisinopril as pt sister had concerns due to another family member having issues while taking the medication.  Pt re-educated and will begin today.  Pt also would like more medihoMorris, note left for pt sister in binder, at her request, on how to purchase OTC.  Pt WBC elevated, pt c/o right testicular pain in which Dr. Tafoya instructed pt to seek care with PCP asap.  Pt otherwise will RTC in 1 month, please refer to MD note.       Hurricane Season: Yes, discussed with patient: With hurricane season approaching, we want to make sure you are fully prepared for any emergency.  Should the National Weather Service or your local authorities recommend a voluntary or mandatory evacuation of your area, The VAD team requires you to evacuate to a safe place.  Remember, when it is a mandatory evacuation, traffic will become an issue for your  limited battery power.  Therefore, we strongly urge you to evacuate early.    The VAD team advises you to have the following in place before hurricane season:  Have an evacuation plan in place including places to evacuate, names and phone numbers.  This information is required to be given to the VAD coordinator.   1. Have your VAD emergency contact numbers with you.   2. Make sure your prescriptions will not run out by the end of September.   3. Make sure you have enough medications, including pills, inhalers, patches,   etc. to take, should you be gone for more than 2 weeks.   4. Make sure ALL of your batteries are fully charged.   5. Bring enough dressing change supplies to last for at least 2 weeks.   6. Bring your VAD binder with you.  Make sure your binder is updated and complete with alarms reference card, patient hand book, emergency contact numbers, daily log sheets, etc.  If you do not have family or friends as an evacuation destination, we recommend evacuating to a safe area.   Do NOT evacuate to [facility].    The VAD team wants to stress the importance of planning for your evacuation in the event of a hurricane.  If you have any LVAD questions or issues, please contact the LVAD coordinator.

## 2020-05-28 NOTE — LETTER
May 29, 2020        Miko Catalan  [address]  [place] [address]  Phone: [phone]  Fax: [phone]             [facility] [address] [place] [address]  Phone: [phone]   Patient: Saba Lott   MR Number: [id number]   YOB: 1965   Date of Visit: 5/28/2020       Dear Dr. Miko Catalan    Thank you for referring Saba Lott to me for evaluation. Attached you will find relevant portions of my assessment and plan of care.    If you have questions, please do not hesitate to call me. I look forward to following Saba Lott along with you.    Sincerely,    Jeanette Tafoya MD    Enclosure    If you would like to receive this communication electronically, please contact [email or web address] or [phone] to request Active End[employer] Link access.    [employer] Link is a tool which provides read-only access to select patient information with whom you have a relationship. Its easy to use and provides real time access to review your patients record including encounter summaries, notes, results, and demographic information.    If you feel you have received this communication in error or would no longer like to receive these types of communications, please e-mail [email or web address]

## 2020-05-29 NOTE — PROCEDURES
TXP CHRISTY INTERROGATIONS 5/28/2020 4/30/2020 3/19/2020 3/12/2020 3/5/2020 2/27/2020 2/27/2020   Type HeartMate3 HeartMate3 HeartMate3 HeartMate3 HeartMate3 - -   Flow 3.8 4.0 4.1 4.2 4.1 - -   Speed 5300 5300 5300 5300 5300 - -   PI 5.5 5.1 4.3 5.1 5.9 - -   Power (Centeno) 4.1 4.1 4.0 4.0 4.0 - -   LSL 4900 4900 4900 4900 4900 - -   Pulsatility Intermittent pulse Intermittent pulse No Pulse No Pulse - Intermittent pulse Intermittent pulse   }

## 2020-06-10 ENCOUNTER — TELEPHONE (OUTPATIENT)
Dept: TRANSPLANT | Facility: CLINIC | Age: 55
End: 2020-06-10

## 2020-06-10 NOTE — TELEPHONE ENCOUNTER
Called to check on pt.  Called all available numbers listed.  All went right to voicemail.  Left message on all 3 numbers for call back.  Will await patient phone call.

## 2020-06-10 NOTE — TELEPHONE ENCOUNTER
----- Message from Zayda Fiore LPN sent at 6/10/2020 10:18 AM CDT -----  Regarding: FOLLOW UP   Pt has not completed any labs since 5/28? Is he currently inpatient at another facility? There is also no coag visit noted as usual. Please follow up with patient.

## 2020-06-11 ENCOUNTER — TELEPHONE (OUTPATIENT)
Dept: TRANSPLANT | Facility: CLINIC | Age: 55
End: 2020-06-11

## 2020-06-11 NOTE — TELEPHONE ENCOUNTER
Attempted to reach pt and pt sister.  Again  No answer.  Called and spoke to pt mother Anna, [phone].  She reports pt has been having lots of phone issues.  Pt mother verbalized to please call her in the event pt is unable to be reached.      Will update the coumadin as well.

## 2020-06-11 NOTE — PROGRESS NOTES
LVM for Mr Lott 6/11/20 on mobile number and work number - to r/s INR.  We will also check w/HH to see if they have been able to reach him.  LVM for Chelsi on 6/11/20 to see if pt has been reinstated with [facility] to see if they have seen or heard from Mr Lott.Catrachita reports that they have tried to contact him on 5/25 &6/3 and were also not able to reach him.

## 2020-06-11 NOTE — TELEPHONE ENCOUNTER
----- Message from Lynn Apodaca PharmD sent at 6/11/2020 10:33 AM CDT -----  Regarding: RE: INR f/u  Hey Cindy  I just tried all numbers, no answer, LVM.   Even reached out to [facility] as they were to reinstate his HH on 6/1.  They have not made contact either after attempts on 5/25 & 6/3.  We cannot seem to track him down either.  ----- Message -----  From: Cindy Armstrong RN  Sent: 6/10/2020  10:40 AM CDT  To: Zayda Fiore LPN, [facility]  Subject: INR f/u                                          Hey Ladies,     Have you all heard from pt?  I do not see any anti coag encounter or notes from you all since 5/28.  I tried to call pt myself and was unable to reach him for our routine labs    Thanks for any info you may offer  Cindy

## 2020-06-12 ENCOUNTER — ANTI-COAG VISIT (OUTPATIENT)
Dept: CARDIOLOGY | Facility: CLINIC | Age: 55
End: 2020-06-12
Payer: COMMERCIAL

## 2020-06-12 ENCOUNTER — TELEPHONE (OUTPATIENT)
Dept: TRANSPLANT | Facility: CLINIC | Age: 55
End: 2020-06-12

## 2020-06-12 ENCOUNTER — DOCUMENTATION ONLY (OUTPATIENT)
Dept: TRANSPLANT | Facility: HOSPITAL | Age: 55
End: 2020-06-12

## 2020-06-12 DIAGNOSIS — Z95.811 LVAD (LEFT VENTRICULAR ASSIST DEVICE) PRESENT: ICD-10-CM

## 2020-06-12 LAB — INR PPP: 1.53

## 2020-06-12 PROCEDURE — 93793 PR ANTICOAGULANT MGMT FOR PT TAKING WARFARIN: ICD-10-PCS | Mod: S$GLB,,,

## 2020-06-12 PROCEDURE — 93793 ANTICOAG MGMT PT WARFARIN: CPT | Mod: S$GLB,,,

## 2020-06-12 NOTE — PROGRESS NOTES
Mr Oren at [phone] and Wife confirmed dose - eating more than usual. Wife rpegiovanni patiemt may have missed some doses cause he has been dealing w/ stress lately but no other changes reported

## 2020-06-12 NOTE — PROGRESS NOTES
INR not at goal. Medications, chart, and patient findings reviewed. See calendar for adjustments to dose and follow up plan.  Will boost and increase for now.  The patient denies missed doses.  I have contacted [facility] to provide the newest contact info.  They report needing a HH order so I have reached out to our SW group to see if new orders are applicable and can be sent.  Will await intake and send order on Monday if he is reinstated, if not he will need to go to lab Tuesday.

## 2020-06-12 NOTE — TELEPHONE ENCOUNTER
"SAMRA received message from [facility] reporting that pt was temporarily discharged from home health because his insurance lapsed; however, insurance is now active again and [facility]  reports they can resume seeing pt but will need new set of HH orders. SAMRA obtained orders & faxed new referral to [facility]  (ph: [phone], f: [phone]). SAMRA then called [facility] ([phone]) and spoke with  who reports they ran pt's insurance today and it still shows inactive.  reports she called pt's mother who states pt still does not have insurance.    SAMRA spoke with pt ([phone]), who reports he is on the other line on hold with [employer] trying to get his insurance issue worked out. Pt states [employer] was supposed to be active on 6/1, but it is not and he is not sure why. SAMRA will f/u with pt on Monday 6/15 to find out if he was able to get issue resolved. SAMRA updated LVAD coords. and [facility] via [employer] message. SW remains available.        UPDATE, 6/15 -- SAMRA spoke with pt on morning of 6/15 to f/u re: insurance status. Pt states that [employer] told him his new effective date is now set for 7/1/20. Pt states that he was "denied for Medicaid" and he needs to call Medicaid to rectify that before [employer] will become active. SAMRA updated LVAD coords. & [facility] via [employer] message.  "

## 2020-06-12 NOTE — PROGRESS NOTES
I have reached Mr Lott at [phone].  He agrees to check to INR today 6/12.  I will fax a new standing order to [facility].  He thinks that he can secure a ride, but I have asked him to call us back in the event his ride falls through.

## 2020-06-12 NOTE — PROGRESS NOTES
[facility]   Heart Transplant/VAD Clinic   [address] [place] [address]   [phone] after hours          [phone] fax     VAD HOME  HEALTH ORDERS    Admit to Home Health    Diagnosis:   Patient Active Problem List   Diagnosis    Leg pain    Systolic dysfunction with acute on chronic heart failure    Non-ischemic cardiomyopathy    Congestive heart failure    AICD (automatic cardioverter/defibrillator) present    Deep vein thrombosis (DVT) of right lower extremity    Cardiogenic shock    History of alcohol abuse    History of tobacco abuse    Acute on chronic combined systolic and diastolic heart failure    Moderate malnutrition    Acute hyperglycemia    Goals of care, counseling/discussion    Advance care planning    Heart transplant candidate    LVAD (left ventricular assist device) present    Leukocytosis    Anemia due to acute blood loss    Presence of left ventricular assist device (LVAD)    Thrombocytosis    Central venous catheter in place    Anticoagulation monitoring, INR range 2-3     Patient is homebound due to:  NYHA Class IV HF. S/P LVAD placement.     Diet: Low Fat, Low cholesterol, 2Gm Na, Coumadin restrictions.    Acitivities: No Swimming, bathing, vacuuming, contact sports.    Fresh implants= Sternal Precautions    Nursing:   SN to complete comprehensive assessment including routine vital signs. Instruct on disease process and s/s of complications to report to MD. Review/verify medication list sent home with the patient at time of discharge  and instruct patient/caregiver as needed. Frequency may be adjusted depending on start of care date.    **LVAD driveline exit site dressing change is to be completed per LVAD patient/caregiver only**.    Notify MD if:  SBP > 120 or < 80;   MAP > 80 or < 65;   HR > 120 or < 60;   Temp > 101;   Weight gain >3lbs in 1 day or 5lbs in 1 week.    LABS:  SN to perform labs: PT/INR per  Coumadin clinic [phone].   Follow up INR date: 6/15/20  No Finger Sticks    Send initial Home Health orders to [facility] attending physician on call.  Send follow up questions to VAD clinic MD [phone] or [phone].

## 2020-06-15 NOTE — PROGRESS NOTES
Pt lemos snot have HH benefits until 7/1/20.  I have asked him to please get an INR today or tomorrow.  He reports that tomorrow 6/16 will be better.  Faxing order to [facility].

## 2020-06-16 ENCOUNTER — ANTI-COAG VISIT (OUTPATIENT)
Dept: CARDIOLOGY | Facility: CLINIC | Age: 55
End: 2020-06-16
Payer: COMMERCIAL

## 2020-06-16 DIAGNOSIS — Z95.811 LVAD (LEFT VENTRICULAR ASSIST DEVICE) PRESENT: ICD-10-CM

## 2020-06-16 LAB — INR PPP: 2.21

## 2020-06-16 PROCEDURE — 93793 ANTICOAG MGMT PT WARFARIN: CPT | Mod: S$GLB,,,

## 2020-06-16 PROCEDURE — 93793 PR ANTICOAGULANT MGMT FOR PT TAKING WARFARIN: ICD-10-PCS | Mod: S$GLB,,,

## 2020-06-18 ENCOUNTER — TELEPHONE (OUTPATIENT)
Dept: TRANSPLANT | Facility: CLINIC | Age: 55
End: 2020-06-18

## 2020-06-18 NOTE — TELEPHONE ENCOUNTER
Contacted [facility] to obtain patient weekly HH labs     Labs were not drawn. They stated that they have only drawn PT/INR no other orders received    Fax number confirmed.     Will fax over lab orders for patient to obtain labs .

## 2020-06-18 NOTE — TELEPHONE ENCOUNTER
FAXED OVER LAB ORDERS FOR June 22ND AND June 29TH TO [facility] FOR PATIENT TO HAVE DRAWN ON THE SAME DAY AS PT/INR.       FAX SENT SUCCESSFULLY.

## 2020-06-19 ENCOUNTER — HOSPITAL ENCOUNTER (EMERGENCY)
Facility: HOSPITAL | Age: 55
Discharge: HOME | End: 2020-06-19
Attending: EMERGENCY MEDICINE
Payer: COMMERCIAL

## 2020-06-19 VITALS
BODY MASS INDEX: 26.68 KG/M2 | OXYGEN SATURATION: 96 % | WEIGHT: 170 LBS | HEART RATE: 82 BPM | HEIGHT: 67 IN | DIASTOLIC BLOOD PRESSURE: 82 MMHG | SYSTOLIC BLOOD PRESSURE: 128 MMHG | RESPIRATION RATE: 16 BRPM | TEMPERATURE: 98 F

## 2020-06-19 DIAGNOSIS — R55 NEAR SYNCOPE: ICD-10-CM

## 2020-06-19 DIAGNOSIS — Z95.811 LVAD (LEFT VENTRICULAR ASSIST DEVICE) PRESENT: Primary | ICD-10-CM

## 2020-06-19 DIAGNOSIS — E86.0 MILD DEHYDRATION: ICD-10-CM

## 2020-06-19 DIAGNOSIS — R55 SYNCOPE: ICD-10-CM

## 2020-06-19 LAB
ALBUMIN SERPL BCP-MCNC: 4.6 G/DL (ref 3.5–5.2)
ALP SERPL-CCNC: 129 U/L (ref 55–135)
ALT SERPL W/O P-5'-P-CCNC: 18 U/L (ref 10–44)
ANION GAP SERPL CALC-SCNC: 13 MMOL/L (ref 8–16)
AST SERPL-CCNC: 22 U/L (ref 10–40)
BASOPHILS # BLD AUTO: 0.03 K/UL (ref 0–0.2)
BASOPHILS NFR BLD: 0.3 % (ref 0–1.9)
BILIRUB SERPL-MCNC: 0.8 MG/DL (ref 0.1–1)
BNP SERPL-MCNC: 88 PG/ML (ref 0–99)
BUN SERPL-MCNC: 25 MG/DL (ref 6–20)
CALCIUM SERPL-MCNC: 10.1 MG/DL (ref 8.7–10.5)
CHLORIDE SERPL-SCNC: 100 MMOL/L (ref 95–110)
CO2 SERPL-SCNC: 21 MMOL/L (ref 23–29)
CREAT SERPL-MCNC: 1.1 MG/DL (ref 0.5–1.4)
DIFFERENTIAL METHOD: ABNORMAL
EOSINOPHIL # BLD AUTO: 0.1 K/UL (ref 0–0.5)
EOSINOPHIL NFR BLD: 0.9 % (ref 0–8)
ERYTHROCYTE [DISTWIDTH] IN BLOOD BY AUTOMATED COUNT: 18.7 % (ref 11.5–14.5)
EST. GFR  (AFRICAN AMERICAN): >60 ML/MIN/1.73 M^2
EST. GFR  (NON AFRICAN AMERICAN): >60 ML/MIN/1.73 M^2
GLUCOSE SERPL-MCNC: 91 MG/DL (ref 70–110)
HCT VFR BLD AUTO: 47.6 % (ref 40–54)
HGB BLD-MCNC: 14.6 G/DL (ref 14–18)
IMM GRANULOCYTES # BLD AUTO: 0.04 K/UL (ref 0–0.04)
IMM GRANULOCYTES NFR BLD AUTO: 0.4 % (ref 0–0.5)
INR PPP: 2.5 (ref 0.8–1.2)
LYMPHOCYTES # BLD AUTO: 0.9 K/UL (ref 1–4.8)
LYMPHOCYTES NFR BLD: 9.3 % (ref 18–48)
MAGNESIUM SERPL-MCNC: 2.4 MG/DL (ref 1.6–2.6)
MCH RBC QN AUTO: 25.2 PG (ref 27–31)
MCHC RBC AUTO-ENTMCNC: 30.7 G/DL (ref 32–36)
MCV RBC AUTO: 82 FL (ref 82–98)
MONOCYTES # BLD AUTO: 0.6 K/UL (ref 0.3–1)
MONOCYTES NFR BLD: 6.1 % (ref 4–15)
NEUTROPHILS # BLD AUTO: 8 K/UL (ref 1.8–7.7)
NEUTROPHILS NFR BLD: 83 % (ref 38–73)
NRBC BLD-RTO: 0 /100 WBC
PLATELET # BLD AUTO: 368 K/UL (ref 150–350)
PMV BLD AUTO: 9.6 FL (ref 9.2–12.9)
POTASSIUM SERPL-SCNC: 5 MMOL/L (ref 3.5–5.1)
PROT SERPL-MCNC: 9.1 G/DL (ref 6–8.4)
PROTHROMBIN TIME: 23.4 SEC (ref 9–12.5)
RBC # BLD AUTO: 5.79 M/UL (ref 4.6–6.2)
SARS-COV-2 RDRP RESP QL NAA+PROBE: NEGATIVE
SODIUM SERPL-SCNC: 134 MMOL/L (ref 136–145)
WBC # BLD AUTO: 9.68 K/UL (ref 3.9–12.7)

## 2020-06-19 PROCEDURE — 99285 PR EMERGENCY DEPT VISIT,LEVEL V: ICD-10-PCS | Mod: ,,, | Performed by: EMERGENCY MEDICINE

## 2020-06-19 PROCEDURE — 99285 EMERGENCY DEPT VISIT HI MDM: CPT | Mod: 25

## 2020-06-19 PROCEDURE — 99285 EMERGENCY DEPT VISIT HI MDM: CPT | Mod: ,,, | Performed by: EMERGENCY MEDICINE

## 2020-06-19 PROCEDURE — 93010 EKG 12-LEAD: ICD-10-PCS | Mod: ,,, | Performed by: INTERNAL MEDICINE

## 2020-06-19 PROCEDURE — 93010 ELECTROCARDIOGRAM REPORT: CPT | Mod: ,,, | Performed by: INTERNAL MEDICINE

## 2020-06-19 PROCEDURE — 83735 ASSAY OF MAGNESIUM: CPT

## 2020-06-19 PROCEDURE — U0002 COVID-19 LAB TEST NON-CDC: HCPCS

## 2020-06-19 PROCEDURE — 85610 PROTHROMBIN TIME: CPT

## 2020-06-19 PROCEDURE — 83880 ASSAY OF NATRIURETIC PEPTIDE: CPT

## 2020-06-19 PROCEDURE — 85025 COMPLETE CBC W/AUTO DIFF WBC: CPT

## 2020-06-19 PROCEDURE — 93005 ELECTROCARDIOGRAM TRACING: CPT

## 2020-06-19 PROCEDURE — 80053 COMPREHEN METABOLIC PANEL: CPT

## 2020-06-19 NOTE — ASSESSMENT & PLAN NOTE
Patient's symptoms and labs point towards likely dehydration  Will decrease bumex to 1 mg by mouth once daily  Stop potassium supplementation  Repeat BMP in 1 week  Ok to discharge from cardiology perspective
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
## 2020-06-19 NOTE — DISCHARGE INSTRUCTIONS
Decrease Bumex to 1 mg daily as discussed with Cardiology.  Hold your potassium supplement now.  Follow-up with cardiology as scheduled.
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
## 2020-06-19 NOTE — HPI
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 He denies any bleeding. He denies any shortness of breath, PND, orthopnea. Symptoms lasted about 30 minutes before they resolved.He had similar episode after he stood up a little bit later that lasted for 20 minutes. He got dizzy when I stood him up during interview. He denies any syncope. He denies any fever, cough,vad alarms, bleeding, discharge around vad site. He denies any leg swelling. His device interrogation did not show any sustained arrhythmia(ventricular lead only).

## 2020-06-19 NOTE — PROGRESS NOTES
Pt paged to report he is very weak and blurred or white vision.  He called EMS and they are at his house, taking him to ER. Dr. Gr notified.
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
## 2020-06-23 DIAGNOSIS — Z95.811 LVAD (LEFT VENTRICULAR ASSIST DEVICE) PRESENT: Primary | ICD-10-CM

## 2020-07-01 ENCOUNTER — TELEPHONE (OUTPATIENT)
Dept: TRANSPLANT | Facility: CLINIC | Age: 55
End: 2020-07-01

## 2020-07-01 NOTE — TELEPHONE ENCOUNTER
Contacted [facility] to obtain patient lab results    Fax number provided    Awaiting results

## 2020-07-06 ENCOUNTER — TELEPHONE (OUTPATIENT)
Dept: TRANSPLANT | Facility: HOSPITAL | Age: 55
End: 2020-07-06

## 2020-07-06 ENCOUNTER — TELEPHONE (OUTPATIENT)
Dept: TRANSPLANT | Facility: CLINIC | Age: 55
End: 2020-07-06

## 2020-07-06 NOTE — TELEPHONE ENCOUNTER
[facility]              Heart Transplant/VAD Clinic   [address] [place] [address]              [phone] after hours          [phone] fax                 VAD HOME  HEALTH ORDERS  Admit to Home Health     Diagnosis:       Patient Active Problem List   Diagnosis    Leg pain    Systolic dysfunction with acute on chronic heart failure    Non-ischemic cardiomyopathy    Congestive heart failure    AICD (automatic cardioverter/defibrillator) present    Deep vein thrombosis (DVT) of right lower extremity    Cardiogenic shock    History of alcohol abuse    History of tobacco abuse    Acute on chronic combined systolic and diastolic heart failure    Moderate malnutrition    Acute hyperglycemia    Goals of care, counseling/discussion    Advance care planning    Heart transplant candidate    LVAD (left ventricular assist device) present    Leukocytosis    Anemia due to acute blood loss    Presence of left ventricular assist device (LVAD)    Thrombocytosis    Central venous catheter in place    Anticoagulation monitoring, INR range 2-3     Patient is homebound due to:  NYHA Class IV HF. S/P LVAD placement.      Diet: Low Fat, Low cholesterol, 2Gm Na, Coumadin restrictions.     Acitivities: No Swimming, bathing, vacuuming, contact sports.     Fresh implants= Sternal Precautions     Nursing:   SN to complete comprehensive assessment including routine vital signs. Instruct on disease process and s/s of complications to report to MD. Review/verify medication list sent home with the patient at time of discharge  and instruct patient/caregiver as needed. Frequency may be adjusted depending on start of care date.     **LVAD driveline exit site dressing change is to be completed per LVAD patient/caregiver only**.     Notify MD if:  SBP > 120 or < 80;   MAP > 80 or < 65;   HR > 120 or < 60;   Temp > 101;   Weight gain >3lbs in 1 day or 5lbs in 1  week.     LABS:  SN to perform labs: PT/INR per Coumadin clinic [phone].   Follow up INR date: 7/13/20  No Finger Sticks     Send initial Home Health orders to [facility] attending physician on call.  Send follow up questions to VAD clinic MD [phone] or [phone].

## 2020-07-06 NOTE — TELEPHONE ENCOUNTER
Contacted [facility] in efforts to obtain lab results form 7/2/2020    Was informed by nurse patient status with them is unknown at this time. He was to be instated in June but pt had no insurance and they have not received any updated information on the patient since that time.     VAD coordinator informed of this information.

## 2020-07-07 NOTE — PROGRESS NOTES
Called [facility] to check if INR had been tested recently -was informed Patient's last INR was 6/16/20, I then called Patient to reschedule a lab draw and Patient reports he now has [facility], I called  and spoke with Chelsi and she reports Patient is being admitted to  service tomorrow 7/08 and states already has an order to draw the PT/INR  
Catrachita/[facility] ) called on 7/1/20 evening to informus that patient was not enrolled by )because of no insurance. Called left mgs for patient twice to called back for lab on today at ([facility]. NO response. Please advise.    
HH faxed for INR to be done 7/2    
INR at goal. Medications and chart reviewed. No changes noted to necessitate adjustment of warfarin or follow-up plan. See calendar.        
LM 6/23 per lab no show on 6/22.    
No INR since 6/16.   I spoke with Mrs Lott today regarding missed labs.  She reports that he will go to lab Monday 7/6 for follow up.    
uncomplicated

## 2020-07-08 ENCOUNTER — ANTI-COAG VISIT (OUTPATIENT)
Dept: CARDIOLOGY | Facility: CLINIC | Age: 55
End: 2020-07-08
Payer: COMMERCIAL

## 2020-07-08 DIAGNOSIS — Z95.811 LVAD (LEFT VENTRICULAR ASSIST DEVICE) PRESENT: ICD-10-CM

## 2020-07-08 LAB
EXT ALBUMIN: 3.9
EXT ALT: 26
EXT AST: 19
EXT BNP: 80
EXT BUN: 20
EXT CALCIUM: 9.3
EXT CHLORIDE: 103
EXT CREATININE: 0.77 MG/DL
EXT GLUCOSE: 82
EXT HEMATOCRIT: 36.4
EXT HEMOGLOBIN: 11.5
EXT MAGNESIUM: 1.9
EXT PHOSPHORUS: 3.8
EXT PLATELETS: 239
EXT POTASSIUM: 3.6
EXT PROTEIN TOTAL: 7.5
EXT SODIUM: 138 MMOL/L
EXT WBC: 7.3
INR PPP: 1.8

## 2020-07-08 PROCEDURE — 93793 PR ANTICOAGULANT MGMT FOR PT TAKING WARFARIN: ICD-10-PCS | Mod: S$GLB,,,

## 2020-07-08 PROCEDURE — 93793 ANTICOAG MGMT PT WARFARIN: CPT | Mod: S$GLB,,,

## 2020-07-09 ENCOUNTER — TELEPHONE (OUTPATIENT)
Dept: TRANSPLANT | Facility: CLINIC | Age: 55
End: 2020-07-09

## 2020-07-09 NOTE — TELEPHONE ENCOUNTER
"SAMRA spoke with pt by phone ([phone]) on 7/6 to f/u on status of insurance. Pt states he thinks insurance should be active now but needs to "call them to check." Pt states he should now have coverage with [employer]. Pt states that once coverage is confirmed, he would like to resume services with [facility].    SAMRA spoke with financial coord. SLY Rowan, who verified pt's new [employer] benefit. Per financial coord., pt will need to call [facility] to request that his VAD care continue to be covered at [facility] since [facility] is not typically in-network with [facility].    SAMRA spoke with [facility] (ph: [phone], f: [phone]), who confirms they accept [employer]. SAMRA faxed updated HH orders & clinical records to [facility] on 7/6. SAMRA spoke with Chelsi at [facility] on 7/7, who confirmed receipt of orders & confirmed that they are scheduled to admit pt to services on 7/8. SAMRA notified LVAD coords. & Coumadin Clinic of HH admit date via Epic message.    SAMRA spoke with pt again on 7/7, who confirms that he received call from [facility] and is aware of visit scheduled for tomorrow. SAMRA explained to pt about need to call [facility] to request coverage of VAD care at [facility] and provided pt with [facility] Member Services # ([phone]). Pt verbalizes understanding and states he will call today. SAMRA instructed pt to call back to LVAD clinic if he has any issues or further questions; pt verbalizes understanding and agreement. SAMRA remains available.  "

## 2020-07-13 LAB — INR PPP: 1.74

## 2020-07-14 ENCOUNTER — ANTI-COAG VISIT (OUTPATIENT)
Dept: CARDIOLOGY | Facility: CLINIC | Age: 55
End: 2020-07-14
Payer: COMMERCIAL

## 2020-07-14 ENCOUNTER — TELEPHONE (OUTPATIENT)
Dept: TRANSPLANT | Facility: CLINIC | Age: 55
End: 2020-07-14

## 2020-07-14 DIAGNOSIS — Z79.01 LONG TERM (CURRENT) USE OF ANTICOAGULANTS: Primary | ICD-10-CM

## 2020-07-14 DIAGNOSIS — Z95.811 LVAD (LEFT VENTRICULAR ASSIST DEVICE) PRESENT: ICD-10-CM

## 2020-07-14 LAB
BNP: 103
BUN BLD-MCNC: 16 MG/DL (ref 4–21)
CALCIUM SERPL-MCNC: 9.1 MG/DL
CREAT SERPL-MCNC: 1 MG/DL
POTASSIUM: 3.7
SODIUM: 138

## 2020-07-14 PROCEDURE — 93793 ANTICOAG MGMT PT WARFARIN: CPT | Mod: S$GLB,,,

## 2020-07-14 PROCEDURE — 93793 PR ANTICOAGULANT MGMT FOR PT TAKING WARFARIN: ICD-10-PCS | Mod: S$GLB,,,

## 2020-07-15 NOTE — PROGRESS NOTES
INR not at goal. Medications, chart, and patient findings reviewed. See calendar for adjustments to dose and follow up plan.  I have boosted aggressively today  - reviewed dose with Mr Lott in [place] and number of tablets.  I have confirmed with [facility] that he will get an INR early on Friday to verify INR is in range.  They will not be able to visit on before Wednesday for next INR.  If needed, we will need to send him to [facility].  HH faxed order to [facility]  for INR Friday 7/17.

## 2020-07-15 NOTE — PROGRESS NOTES
Patient confirmed dose - denies greens or missed doses and stated he ate a shrimp bouillon . Also confirmed correct dose tablet .

## 2020-07-17 ENCOUNTER — ANTI-COAG VISIT (OUTPATIENT)
Dept: CARDIOLOGY | Facility: CLINIC | Age: 55
End: 2020-07-17

## 2020-07-17 DIAGNOSIS — Z95.811 LVAD (LEFT VENTRICULAR ASSIST DEVICE) PRESENT: ICD-10-CM

## 2020-07-17 LAB — INR PPP: 2.5

## 2020-07-21 LAB
BUN BLD-MCNC: 12 MG/DL (ref 4–21)
CALCIUM SERPL-MCNC: 8.6 MG/DL
CARBON DIOXIDE, CO2: 22
CHLORIDE: 99
CREAT SERPL-MCNC: 1 MG/DL
GLUCOSE: 82
MAGNESIUM SERPL-MCNC: 2 MG/DL (ref 1.6–2.4)
PHOSPHATE FLD-MCNC: 3.4 MG/DL (ref 2.5–4.9)
POTASSIUM: 3.9
SODIUM: 139

## 2020-07-21 NOTE — PROGRESS NOTES
Called [facility]  to get 7/20 result faxed to coumadin clinic, spoke with jonathan and she reports no order was received, also reports she had notified coumadin clinic that INR would not be able to be drawn until 7/22 by the  nurse, INR lab draw was rescheduled to 7/22/20, order was faxed to [facility]

## 2020-07-23 ENCOUNTER — TELEPHONE (OUTPATIENT)
Dept: TRANSPLANT | Facility: CLINIC | Age: 55
End: 2020-07-23

## 2020-07-23 ENCOUNTER — ANTI-COAG VISIT (OUTPATIENT)
Dept: CARDIOLOGY | Facility: CLINIC | Age: 55
End: 2020-07-23
Payer: COMMERCIAL

## 2020-07-23 DIAGNOSIS — Z95.811 LVAD (LEFT VENTRICULAR ASSIST DEVICE) PRESENT: ICD-10-CM

## 2020-07-23 LAB — INR PPP: 2

## 2020-07-23 PROCEDURE — 93793 PR ANTICOAGULANT MGMT FOR PT TAKING WARFARIN: ICD-10-PCS | Mod: S$GLB,,,

## 2020-07-23 PROCEDURE — 93793 ANTICOAG MGMT PT WARFARIN: CPT | Mod: S$GLB,,,

## 2020-07-23 NOTE — PROGRESS NOTES
Called [facility] to get 7/22 INR result, spoke with Stephenie and she reports INR was drawn but lab thought it was from 7/21/20   and said it was too old, nurse went back out and did a fingerstick and Stephenie gave a verbal result as: INR -2.0 dated 7/22/20 via fingerstick, result to be faxed

## 2020-07-23 NOTE — TELEPHONE ENCOUNTER
Left message for nurse Gagnon. Failed attempt to have office fax over weekly lab results. Left return call back , fax number and patient information.     Currently awaiting call back or fax lab results.

## 2020-07-27 ENCOUNTER — ANTI-COAG VISIT (OUTPATIENT)
Dept: CARDIOLOGY | Facility: CLINIC | Age: 55
End: 2020-07-27
Payer: COMMERCIAL

## 2020-07-27 DIAGNOSIS — Z95.811 LVAD (LEFT VENTRICULAR ASSIST DEVICE) PRESENT: ICD-10-CM

## 2020-07-27 LAB — INR PPP: 2.1

## 2020-07-27 PROCEDURE — 93793 ANTICOAG MGMT PT WARFARIN: CPT | Mod: S$GLB,,,

## 2020-07-27 PROCEDURE — 93793 PR ANTICOAGULANT MGMT FOR PT TAKING WARFARIN: ICD-10-PCS | Mod: S$GLB,,,

## 2020-07-27 NOTE — PROGRESS NOTES
Melinda with [facility] called to report she was unable to get a venipuncture--tried 3 times, she then margie a fingerstick and gave a result as: INR -2.1 drawn today 7/27/20, result to be faxed

## 2020-07-29 ENCOUNTER — CLINICAL SUPPORT (OUTPATIENT)
Dept: TRANSPLANT | Facility: CLINIC | Age: 55
End: 2020-07-29
Payer: COMMERCIAL

## 2020-07-29 ENCOUNTER — DOCUMENTATION ONLY (OUTPATIENT)
Dept: TRANSPLANT | Facility: CLINIC | Age: 55
End: 2020-07-29

## 2020-07-29 ENCOUNTER — HOSPITAL ENCOUNTER (OUTPATIENT)
Dept: PULMONOLOGY | Facility: CLINIC | Age: 55
Discharge: HOME | End: 2020-07-29
Payer: COMMERCIAL

## 2020-07-29 ENCOUNTER — OFFICE VISIT (OUTPATIENT)
Dept: TRANSPLANT | Facility: CLINIC | Age: 55
End: 2020-07-29
Payer: COMMERCIAL

## 2020-07-29 ENCOUNTER — ANTI-COAG VISIT (OUTPATIENT)
Dept: CARDIOLOGY | Facility: CLINIC | Age: 55
End: 2020-07-29
Payer: MEDICAID

## 2020-07-29 VITALS — WEIGHT: 196 LBS | SYSTOLIC BLOOD PRESSURE: 80 MMHG | BODY MASS INDEX: 30.76 KG/M2 | HEIGHT: 67 IN | TEMPERATURE: 98 F

## 2020-07-29 VITALS — BODY MASS INDEX: 28.25 KG/M2 | HEIGHT: 67 IN | WEIGHT: 180 LBS

## 2020-07-29 DIAGNOSIS — Z95.811 HEART REPLACED BY HEART ASSIST DEVICE: ICD-10-CM

## 2020-07-29 DIAGNOSIS — I42.8 NON-ISCHEMIC CARDIOMYOPATHY: ICD-10-CM

## 2020-07-29 DIAGNOSIS — Z95.811 LVAD (LEFT VENTRICULAR ASSIST DEVICE) PRESENT: ICD-10-CM

## 2020-07-29 DIAGNOSIS — Z95.811 LVAD (LEFT VENTRICULAR ASSIST DEVICE) PRESENT: Primary | ICD-10-CM

## 2020-07-29 DIAGNOSIS — Z79.01 ANTICOAGULATION MONITORING, INR RANGE 2-3: ICD-10-CM

## 2020-07-29 DIAGNOSIS — Z87.891 HISTORY OF TOBACCO ABUSE: ICD-10-CM

## 2020-07-29 DIAGNOSIS — Z95.810 AICD (AUTOMATIC CARDIOVERTER/DEFIBRILLATOR) PRESENT: ICD-10-CM

## 2020-07-29 DIAGNOSIS — I50.42 CHRONIC COMBINED SYSTOLIC AND DIASTOLIC HEART FAILURE: ICD-10-CM

## 2020-07-29 DIAGNOSIS — F10.11 HISTORY OF ALCOHOL ABUSE: ICD-10-CM

## 2020-07-29 DIAGNOSIS — I82.421 DEEP VEIN THROMBOSIS (DVT) OF ILIAC VEIN OF RIGHT LOWER EXTREMITY, UNSPECIFIED CHRONICITY: ICD-10-CM

## 2020-07-29 PROBLEM — R57.0 CARDIOGENIC SHOCK: Status: RESOLVED | Noted: 2020-01-15 | Resolved: 2020-07-29

## 2020-07-29 PROBLEM — Z71.89 GOALS OF CARE, COUNSELING/DISCUSSION: Status: RESOLVED | Noted: 2020-01-27 | Resolved: 2020-07-29

## 2020-07-29 PROCEDURE — 93750 INTERROGATION VAD IN PERSON: CPT | Mod: S$GLB,,, | Performed by: INTERNAL MEDICINE

## 2020-07-29 PROCEDURE — 99214 OFFICE O/P EST MOD 30 MIN: CPT | Mod: S$GLB,,, | Performed by: INTERNAL MEDICINE

## 2020-07-29 PROCEDURE — 99214 PR OFFICE/OUTPT VISIT, EST, LEVL IV, 30-39 MIN: ICD-10-PCS | Mod: S$GLB,,, | Performed by: INTERNAL MEDICINE

## 2020-07-29 PROCEDURE — 93750 OP LVAD INTERROGATION: ICD-10-PCS | Mod: S$GLB,,, | Performed by: INTERNAL MEDICINE

## 2020-07-29 PROCEDURE — 94618 PULMONARY STRESS TESTING: ICD-10-PCS | Mod: S$GLB,,, | Performed by: INTERNAL MEDICINE

## 2020-07-29 PROCEDURE — 94618 PULMONARY STRESS TESTING: CPT | Mod: S$GLB,,, | Performed by: INTERNAL MEDICINE

## 2020-07-29 PROCEDURE — 99999 PR PBB SHADOW E&M-EST. PATIENT-LVL IV: ICD-10-PCS | Mod: PBBFAC,,, | Performed by: INTERNAL MEDICINE

## 2020-07-29 PROCEDURE — 99999 PR PBB SHADOW E&M-EST. PATIENT-LVL IV: CPT | Mod: PBBFAC,,, | Performed by: INTERNAL MEDICINE

## 2020-07-29 NOTE — NURSING
Pt presented for the 6th month follow-up and verbalized consent and willingness to continue to participate with the INTERMACS registry.      Patient completed the EQ-5D quality of life questionnaire, KCCQ, and the Trail Making neurocognitive test in 112 seconds. Coordinator administered.

## 2020-07-29 NOTE — PROGRESS NOTES
Mr Lott did not answer, javier still in clinic.  I have left dosing directions with Mrs Lott for this evening.  He will boost tonight and then resume his dose.  HH to recheck on Monday.

## 2020-07-29 NOTE — PROCEDURES
TXP CHRISTY INTERROGATIONS 7/29/2020 5/28/2020 4/30/2020 3/19/2020 3/12/2020 3/5/2020 2/27/2020   Type HeartMate3 HeartMate3 HeartMate3 HeartMate3 HeartMate3 HeartMate3 -   Flow 4.2 3.8 4.0 4.1 4.2 4.1 -   Speed 5300 5300 5300 5300 5300 5300 -   PI 4.2 5.5 5.1 4.3 5.1 5.9 -   Power (Centeno) 4.1 4.1 4.1 4.0 4.0 4.0 -   LSL 4900 4900 4900 4900 4900 4900 -   Pulsatility No Pulse Intermittent pulse Intermittent pulse No Pulse No Pulse - Intermittent pulse   }Interrogation of Ventricular assist device was performed with physician analysis of device parameters and review of device function. I have personally reviewed the interrogation findings and agree with findings as stated.

## 2020-07-29 NOTE — PROGRESS NOTES
Subjective:   Patient ID:  Saba Lott is a 54 y.o. male who presents for LVAD followup visit.    Implant Date: 2/6/2020     Heartmate 3 RPM 5300     INR goal: 2-3   Bridge with Heparin   Antiplatelets:      TXP CHRISTY INTERROGATIONS 7/29/2020   Type HeartMate3   Flow 4.2   Speed 5300   PI 4.2   Power (Centeno) 4.1   LSL 4900   Pulsatility No Pulse       HPI   Mr. Lott is a very pleasant 54 year old AAM with DCM, HTN, tobacco and ETOH abuse (quit 2018), who presented to the hospital in January with ADHF/cardiogenic shock, was satrted on CRRT, had IABP placed, recovered from this from renal standpoint and labs, and proceeded with LVAD impalnt 02/06. Post op course was complicated by some RV failure requiring dobutamine, high dose diuretics, but was eventually weaned off successfully, with PICC and  weaned off as well. Patient had some post op thrombocytosis however heme onc consulted and felt it was reactive. Comes for his regular clinic visit    Since last visit, pt has been doing well- walking about a mile a day and doesn't have to stop to catch his breath. No swelling, CP, orthopnea, PND. BM normal without blood. Only had one alarm (see below)    DLES is grade 1    Interrogation of device data reveals no alarms, normal flows and power (see VAD interrogation report for full details.) pump off alarm yest with DL disconnected when he decided to uncurl his DL and disconnected his DL from his controller (pt very upset when re-educated)  Also seen to have a little tear on the DL (resuce tape to be applied today)    6mw today 366  m                                         O2 sat   98-> 98 %                                                           HR 93  ->      115                                                            BP  96 /71   -> /                                                         Nan  0   -> 0        Review of Systems   Constitution: Negative. Negative for chills, decreased appetite,  "diaphoresis, fever, malaise/fatigue, night sweats, weight gain and weight loss.   Eyes: Negative.    Cardiovascular: Negative for chest pain, claudication, cyanosis, dyspnea on exertion, irregular heartbeat, leg swelling, near-syncope, orthopnea, palpitations, paroxysmal nocturnal dyspnea and syncope.   Respiratory: Negative for cough, hemoptysis and shortness of breath.    Endocrine: Negative.    Hematologic/Lymphatic: Negative.    Skin: Negative for color change, dry skin and nail changes.   Musculoskeletal: Negative.    Gastrointestinal: Negative.    Genitourinary: Negative.    Neurological: Negative for weakness.       Objective:   Blood pressure (!) 80/0, temperature 98.2 °F (36.8 °C), temperature source Oral, height 5' 7" (1.702 m), weight 88.9 kg (196 lb).body mass index is 30.7 kg/m².    Doppler: 80 no cuff or palp pulse    Physical Exam   Constitutional: He appears well-developed.   HENT:   Head: Normocephalic.   Neck: No JVD present. Carotid bruit is not present.   Cardiovascular: Regular rhythm and normal heart sounds.   No murmur heard.  Smooth VAD hum. DLES is "1"   Pulmonary/Chest: Effort normal and breath sounds normal. No respiratory distress. He has no wheezes. He has no rales.   Abdominal: Soft. Bowel sounds are normal. He exhibits no distension. There is no abdominal tenderness. There is no rebound.   Musculoskeletal:         General: No edema.   Neurological: He is alert.   Skin: Skin is warm.   Vitals reviewed.      Lab Results   Component Value Date    WBC 7.39 07/29/2020    HGB 13.9 (L) 07/29/2020    HCT 45.3 07/29/2020    MCV 88 07/29/2020     07/29/2020    CO2 26 07/29/2020    CREATININE 0.9 07/29/2020    CALCIUM 9.0 07/29/2020    ALKALINEPHOS 144 05/04/2020    ALBUMIN 4.2 07/29/2020    AST 21 07/29/2020    BNP 90 07/29/2020    ALT 27 07/29/2020     07/29/2020       Lab Results   Component Value Date    INR 1.7 (H) 07/29/2020    INR 2.1 07/27/2020    INR 2.0 07/22/2020 "       BNP   Date Value Ref Range Status   07/29/2020 90 0 - 99 pg/mL Final     Comment:     Values of less than 100 pg/ml are consistent with non-CHF populations.   06/19/2020 88 0 - 99 pg/mL Final     Comment:     Values of less than 100 pg/ml are consistent with non-CHF populations.   05/28/2020 301 (H) 0 - 99 pg/mL Final     Comment:     Values of less than 100 pg/ml are consistent with non-CHF populations.       LD   Date Value Ref Range Status   07/29/2020 163 110 - 260 U/L Final     Comment:     Results are increased in hemolyzed samples.   05/28/2020 183 110 - 260 U/L Final     Comment:     Results are increased in hemolyzed samples.   04/30/2020 194 110 - 260 U/L Final     Comment:     Results are increased in hemolyzed samples.       Assessment:      1. LVAD (left ventricular assist device) present    2. Chronic combined systolic and diastolic heart failure    3. Non-ischemic cardiomyopathy    4. Anticoagulation monitoring, INR range 2-3    5. History of tobacco abuse    6. History of alcohol abuse    7. Deep vein thrombosis (DVT) of iliac vein of right lower extremity, unspecified chronicity    8. AICD (automatic cardioverter/defibrillator) present    9. Heart replaced by heart assist device        Plan:   Patient is now NYHA class II. BP is controlled.  INR is slightly subtherapeutic. Coumadin clinic to adjust his dose.     rescue tape applied to driveline today and re-educated to never disconnect his DL from his controller.    VAD interrogation was performed in clinic    Any VAD management kits dispensed today medically necessary (2 boxes daily kits)    Recommend 2 gram sodium restriction and 1500cc fluid restriction.  Encourage physical activity with graded exercise program.  Requested patient to weigh themselves daily, and to notify us if their weight increases by more than 3 lbs in 1 day or 5 lbs in 1 week.   RTC in 1 month     Listed for transplant: No. Implanted as DT.    UNOS Patient  Status  Functional Status: 80% - Normal activity with effort: some symptoms of disease  Physical Capacity: No Limitations  Working for Income: Unknown

## 2020-07-29 NOTE — LETTER
July 29, 2020        Miko Catalan  [address] [facility]  [place] [address]  Phone: [phone]  Fax: [phone]             [facility] [address] [place] [address]  Phone: [phone]   Patient: Saba Lott   MR Number: [id number]   YOB: 1965   Date of Visit: 7/29/2020       Dear Dr. Miko Catalan    Thank you for referring Saba Lott to me for evaluation. Attached you will find relevant portions of my assessment and plan of care.    If you have questions, please do not hesitate to call me. I look forward to following Saba Lott along with you.    Sincerely,    Lian Daniel MD    Enclosure    If you would like to receive this communication electronically, please contact [email or web address] or [phone] to request navabi Link access.    [employer] Link is a tool which provides read-only access to select patient information with whom you have a relationship. Its easy to use and provides real time access to review your patients record including encounter summaries, notes, results, and demographic information.    If you feel you have received this communication in error or would no longer like to receive these types of communications, please e-mail [email or web address]

## 2020-07-29 NOTE — PATIENT INSTRUCTIONS
Call us if you find yourself getting more short of breath, have more swelling or unexpected weight changes, fever, chills, alarms, bloody or black bowel movements, or drainage from your driveline    Keep salt intake to under 2000 mg sodium, fluids to under 2 L (64 oz)      Watch the green vegetables :)

## 2020-07-30 NOTE — PROCEDURES
Saba Lott is a 54 y.o.  male patient, who presents for a 6 minute walk test ordered by MD Madelyn.  The diagnosis is (LVAD); Cardiomyopathy.  The patient's BMI is 28.2 kg/m2.  Predicted distance (lower limit of normal) is 447.1 meters.      Test Results:    The test was completed without stopping.  The total time walked was 360 seconds.  During walking, the patient reported:  No complaints.  The patient used no assistive devices during testing.     07/29/2020---------Distance: 365.76 meters (1200 feet)     O2 Sat % Supplemental Oxygen Heart Rate Blood Pressure Nan Scale   Pre-exercise  (Resting) 98 % Room Air 93 bpm 96/71 mmHg 0   During Exercise 98 % Room Air 115 bpm Unable to obtain 0   Post-exercise  (Recovery) 99 % Room Air  93 bpm       Recovery Time: 120 seconds   The patient completed the initial 15 feet in 3.41 seconds.    Performing nurse/tech: Fadumo CASH      PREVIOUS STUDY:   05/28/2019---------Distance: 304.8 meters (1000 feet)       O2 Sat % Supplemental Oxygen Heart Rate Blood Pressure Nan Scale   Pre-exercise  (Resting) 98 % Room Air 69 bpm 102/68 mmHg 2   During Exercise   98 % Room Air 76 bpm 102/69 mmHg 2   Post-exercise  (Recovery) 98 % Room Air  72 bpm           CLINICAL INTERPRETATION:  Six minute walk distance is 365.76 meters (1200 feet) with no dyspnea.  During exercise, there was no desaturation while breathing room air.  Heart rate increased significantly with walking.  The patient did not report non-pulmonary symptoms during exercise.  Since the previous study in May 2019, exercise capacity is significantly improved.  Based upon age and body mass index, exercise capacity is less than predicted.
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
## 2020-07-31 ENCOUNTER — TELEPHONE (OUTPATIENT)
Dept: TRANSPLANT | Facility: CLINIC | Age: 55
End: 2020-07-31

## 2020-07-31 NOTE — TELEPHONE ENCOUNTER
Patient called to report one port of his battery charger is showing red but other ports are showing yellow and charging his batteries. Patient stated when he moved his battery to a different slot it started charging. Instructed patient that he will need to come here to get his equipment looked at or issue a loaner. Patient is unable to get here before 5pm today but is able to come Monday between 10-11. Instructed patient to come Monday and if he has any more issues to call back but use the 3 working ports to charge his batteries. Patient verbalized understanding.

## 2020-08-03 ENCOUNTER — ANTI-COAG VISIT (OUTPATIENT)
Dept: CARDIOLOGY | Facility: CLINIC | Age: 55
End: 2020-08-03
Payer: COMMERCIAL

## 2020-08-03 LAB — INR PPP: 3.4

## 2020-08-03 PROCEDURE — 93793 PR ANTICOAGULANT MGMT FOR PT TAKING WARFARIN: ICD-10-PCS | Mod: S$GLB,,,

## 2020-08-03 PROCEDURE — 93793 ANTICOAG MGMT PT WARFARIN: CPT | Mod: S$GLB,,,
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
## 2020-08-07 ENCOUNTER — TELEPHONE (OUTPATIENT)
Dept: TRANSPLANT | Facility: CLINIC | Age: 55
End: 2020-08-07

## 2020-08-07 NOTE — TELEPHONE ENCOUNTER
----- Message from Cindy Armstrong RN sent at 8/5/2020  1:14 PM CDT -----  Regarding: Weekly CBC, CMP, Mg, Phos, BNP  STAT  (ph: [phone], f: [phone])   ----- Message -----  From: Shani Cagle RN  Sent: 4/13/2020  12:59 PM CDT  To: [facility] Lvad Clinical  Subject: Weekly  Labs

## 2020-08-07 NOTE — TELEPHONE ENCOUNTER
Contacted [facility] to obtain this week lab results    Fax number provided    Awaiting lab results

## 2020-08-10 ENCOUNTER — TELEPHONE (OUTPATIENT)
Dept: TRANSPLANT | Facility: CLINIC | Age: 55
End: 2020-08-10

## 2020-08-10 LAB — INR PPP: 2.41

## 2020-08-10 NOTE — TELEPHONE ENCOUNTER
----- Message from Cindy Armstrong RN sent at 8/5/2020  1:14 PM CDT -----  Regarding: Weekly CBC, CMP, Mg, Phos, BNP  STAT  (ph: [phone], f: [phone])   ----- Message -----  From: Shani Cagle RN  Sent: 4/13/2020  12:59 PM CDT  To: [facility] Lvad Clinical  Subject: Weekly  Labs

## 2020-08-10 NOTE — TELEPHONE ENCOUNTER
Left message for Chelsi with [facility]   still awaiting lab results from last week  Left fax number and call back number

## 2020-08-10 NOTE — TELEPHONE ENCOUNTER
Spoke with Catrachita from [facility]     Nurse has been having issues drawing patient labs.    They will attempt to draw on tomorrow.     No labs drawn on last week.     VAD coordinator notified.

## 2020-08-10 NOTE — TELEPHONE ENCOUNTER
----- Message from Cindy Armstrong RN sent at 8/5/2020  1:14 PM CDT -----  Regarding: Weekly CBC, CMP, Mg, Phos, BNP  STAT  (ph: [phone], f: [phone])   ----- Message -----  From: Shani Cagle RN  Sent: 4/13/2020  12:59 PM CDT  To: [facility] Lvad Clinical  Subject: Weekly  Labs

## 2020-08-11 ENCOUNTER — ANTI-COAG VISIT (OUTPATIENT)
Dept: CARDIOLOGY | Facility: CLINIC | Age: 55
End: 2020-08-11
Payer: COMMERCIAL

## 2020-08-11 DIAGNOSIS — Z95.811 LVAD (LEFT VENTRICULAR ASSIST DEVICE) PRESENT: ICD-10-CM

## 2020-08-11 PROCEDURE — 93793 PR ANTICOAGULANT MGMT FOR PT TAKING WARFARIN: ICD-10-PCS | Mod: S$GLB,,,

## 2020-08-11 PROCEDURE — 93793 ANTICOAG MGMT PT WARFARIN: CPT | Mod: S$GLB,,,

## 2020-08-13 ENCOUNTER — ANTI-COAG VISIT (OUTPATIENT)
Dept: CARDIOLOGY | Facility: CLINIC | Age: 55
End: 2020-08-13
Payer: COMMERCIAL

## 2020-08-13 ENCOUNTER — TELEPHONE (OUTPATIENT)
Dept: TRANSPLANT | Facility: CLINIC | Age: 55
End: 2020-08-13

## 2020-08-13 DIAGNOSIS — Z95.811 LVAD (LEFT VENTRICULAR ASSIST DEVICE) PRESENT: ICD-10-CM

## 2020-08-13 LAB — INR PPP: 2.7

## 2020-08-13 PROCEDURE — 93793 PR ANTICOAGULANT MGMT FOR PT TAKING WARFARIN: ICD-10-PCS | Mod: S$GLB,,,

## 2020-08-13 PROCEDURE — 93793 ANTICOAG MGMT PT WARFARIN: CPT | Mod: S$GLB,,,

## 2020-08-13 NOTE — TELEPHONE ENCOUNTER
Contacted [facility]  to obtain this week lab results  Spoke with Catrachita whom stated they did not draw labs this week because there was no order.    She asked that new lab orders be faxed for weekly labs.     Labs will be drawn on Monday along with PT/INR    Understanding verbalized.

## 2020-08-19 ENCOUNTER — ANTI-COAG VISIT (OUTPATIENT)
Dept: CARDIOLOGY | Facility: CLINIC | Age: 55
End: 2020-08-19
Payer: COMMERCIAL

## 2020-08-19 DIAGNOSIS — Z95.811 LVAD (LEFT VENTRICULAR ASSIST DEVICE) PRESENT: ICD-10-CM

## 2020-08-19 LAB
ALP SERPL-CCNC: 115 U/L (ref 25–125)
BILIRUBIN TOTAL: 0.4
BNP: 237
EXT ALBUMIN: 4.4
EXT ALT: 20
EXT AST: 19
EXT BUN: 17
EXT CALCIUM: 8.9
EXT CHLORIDE: 100
EXT CREATININE: 0.89 MG/DL
EXT GLUCOSE: 86
EXT HEMATOCRIT: 39.6
EXT HEMOGLOBIN: 13
EXT MAGNESIUM: 2.1
EXT PLATELETS: 294
EXT POTASSIUM: 4.6
EXT PROTEIN TOTAL: 6.9
EXT SODIUM: 139 MMOL/L
EXT WBC: 6.7
INR PPP: 2.6
PHOSPHATE FLD-MCNC: 3.6 MG/DL (ref 2.5–4.9)

## 2020-08-19 PROCEDURE — 93793 ANTICOAG MGMT PT WARFARIN: CPT | Mod: S$GLB,,,

## 2020-08-19 PROCEDURE — 93793 PR ANTICOAGULANT MGMT FOR PT TAKING WARFARIN: ICD-10-PCS | Mod: S$GLB,,,

## 2020-08-20 ENCOUNTER — TELEPHONE (OUTPATIENT)
Dept: TRANSPLANT | Facility: CLINIC | Age: 55
End: 2020-08-20

## 2020-08-20 NOTE — TELEPHONE ENCOUNTER
----- Message from Cindy Armstrong RN sent at 8/5/2020  1:14 PM CDT -----  Regarding: Weekly CBC, CMP, Mg, Phos, BNP  STAT  (ph: [phone], f: [phone])   ----- Message -----  From: Shani Cagle RN  Sent: 4/13/2020  12:59 PM CDT  To: [facility] Lvad Clinical  Subject: Weekly  Labs

## 2020-08-20 NOTE — TELEPHONE ENCOUNTER
----- Message from Cindy Armstrong RN sent at 8/5/2020  1:14 PM CDT -----  Regarding: Weekly CBC, CMP, Mg, Phos, BNP  STAT  (ph: [phone], f: [phone])   ----- Message -----  From: Shani Cagle RN  Sent: 4/13/2020  12:59 PM CDT  To: [facility] Lvad Clinical  Subject: Weekly  Labs

## 2020-08-20 NOTE — TELEPHONE ENCOUNTER
Contacted [facility] regarding weekly lab results    Labs were drawn on yesterday    HH has not received results as of yet,  will contact lab to get the results

## 2020-08-27 NOTE — PROGRESS NOTES
Spoke with pt and he states HH did not come and draw his labs. Called HH and someone from the answer center picked up. She states they are closed due to the weather. Advised her that I will send over a fax to have the patient lab drawn on 8/31/2020. Called pt to tell him the clinic is closed that's why no one showed up to draw his las.

## 2020-08-31 ENCOUNTER — TELEPHONE (OUTPATIENT)
Dept: TRANSPLANT | Facility: CLINIC | Age: 55
End: 2020-08-31

## 2020-08-31 LAB
BNP: 74.9
EXT ALBUMIN: 4.1
EXT ALT: 16
EXT AST: 17
EXT BUN: 16
EXT CALCIUM: 9.1
EXT CHLORIDE: 100
EXT CREATININE: 0.92 MG/DL
EXT GLUCOSE: 80
EXT HEMATOCRIT: 42
EXT HEMOGLOBIN: 13.5
EXT MAGNESIUM: 2.1
EXT PLATELETS: 294
EXT POTASSIUM: 4.5
EXT PROTEIN TOTAL: 7.2
EXT SODIUM: 140 MMOL/L
EXT WBC: 7.1
PHOSPHATE FLD-MCNC: 4.1 MG/DL (ref 2.5–4.9)

## 2020-08-31 NOTE — TELEPHONE ENCOUNTER
Contacted [facility] to obtain last week lab results, that were never received    Spoke with Addie    Due to weather ,labs were not drawn last week. However, labs will be drawn today. Understanding verbalized.

## 2020-09-01 ENCOUNTER — ANTI-COAG VISIT (OUTPATIENT)
Dept: CARDIOLOGY | Facility: CLINIC | Age: 55
End: 2020-09-01
Payer: COMMERCIAL

## 2020-09-01 ENCOUNTER — TELEPHONE (OUTPATIENT)
Dept: TRANSPLANT | Facility: CLINIC | Age: 55
End: 2020-09-01

## 2020-09-01 DIAGNOSIS — Z95.811 LVAD (LEFT VENTRICULAR ASSIST DEVICE) PRESENT: ICD-10-CM

## 2020-09-01 LAB — INR PPP: 1.8

## 2020-09-01 PROCEDURE — 93793 ANTICOAG MGMT PT WARFARIN: CPT | Mod: S$GLB,,,

## 2020-09-01 PROCEDURE — 93793 PR ANTICOAGULANT MGMT FOR PT TAKING WARFARIN: ICD-10-PCS | Mod: S$GLB,,,

## 2020-09-01 NOTE — PROGRESS NOTES
Called [facility]  to get 8/31 lab result, spoke with Stephenie and she reports it was already faxed to [facility], Stephenie gave a verbal result as: INR -1.8 / PT -18.0 dated 8/31/20

## 2020-09-02 ENCOUNTER — LAB VISIT (OUTPATIENT)
Dept: LAB | Facility: HOSPITAL | Age: 55
End: 2020-09-02
Attending: INTERNAL MEDICINE
Payer: COMMERCIAL

## 2020-09-02 ENCOUNTER — CLINICAL SUPPORT (OUTPATIENT)
Dept: TRANSPLANT | Facility: CLINIC | Age: 55
End: 2020-09-02
Payer: COMMERCIAL

## 2020-09-02 ENCOUNTER — ANTI-COAG VISIT (OUTPATIENT)
Dept: CARDIOLOGY | Facility: CLINIC | Age: 55
End: 2020-09-02
Payer: MEDICAID

## 2020-09-02 ENCOUNTER — OFFICE VISIT (OUTPATIENT)
Dept: TRANSPLANT | Facility: CLINIC | Age: 55
End: 2020-09-02
Payer: COMMERCIAL

## 2020-09-02 VITALS — BODY MASS INDEX: 32.22 KG/M2 | TEMPERATURE: 98 F | WEIGHT: 205.31 LBS | HEIGHT: 67 IN | SYSTOLIC BLOOD PRESSURE: 88 MMHG

## 2020-09-02 DIAGNOSIS — Z95.811 HEART REPLACED BY HEART ASSIST DEVICE: ICD-10-CM

## 2020-09-02 DIAGNOSIS — Z95.811 LVAD (LEFT VENTRICULAR ASSIST DEVICE) PRESENT: ICD-10-CM

## 2020-09-02 DIAGNOSIS — E78.2 MIXED HYPERLIPIDEMIA: ICD-10-CM

## 2020-09-02 LAB
ALBUMIN SERPL BCP-MCNC: 4.1 G/DL (ref 3.5–5.2)
ALP SERPL-CCNC: 127 U/L (ref 55–135)
ALT SERPL W/O P-5'-P-CCNC: 18 U/L (ref 10–44)
ANION GAP SERPL CALC-SCNC: 9 MMOL/L (ref 8–16)
AST SERPL-CCNC: 17 U/L (ref 10–40)
BASOPHILS # BLD AUTO: 0.02 K/UL (ref 0–0.2)
BASOPHILS NFR BLD: 0.2 % (ref 0–1.9)
BILIRUB DIRECT SERPL-MCNC: 0.3 MG/DL (ref 0.1–0.3)
BILIRUB SERPL-MCNC: 0.5 MG/DL (ref 0.1–1)
BNP SERPL-MCNC: 120 PG/ML (ref 0–99)
BUN SERPL-MCNC: 15 MG/DL (ref 6–20)
CALCIUM SERPL-MCNC: 8.9 MG/DL (ref 8.7–10.5)
CHLORIDE SERPL-SCNC: 106 MMOL/L (ref 95–110)
CHOLEST SERPL-MCNC: 141 MG/DL (ref 120–199)
CHOLEST/HDLC SERPL: 2.2 {RATIO} (ref 2–5)
CO2 SERPL-SCNC: 26 MMOL/L (ref 23–29)
CREAT SERPL-MCNC: 0.8 MG/DL (ref 0.5–1.4)
CRP SERPL-MCNC: 3.8 MG/L (ref 0–8.2)
DIFFERENTIAL METHOD: ABNORMAL
EOSINOPHIL # BLD AUTO: 0.2 K/UL (ref 0–0.5)
EOSINOPHIL NFR BLD: 2.6 % (ref 0–8)
ERYTHROCYTE [DISTWIDTH] IN BLOOD BY AUTOMATED COUNT: 14.6 % (ref 11.5–14.5)
EST. GFR  (AFRICAN AMERICAN): >60 ML/MIN/1.73 M^2
EST. GFR  (NON AFRICAN AMERICAN): >60 ML/MIN/1.73 M^2
GLUCOSE SERPL-MCNC: 68 MG/DL (ref 70–110)
HCT VFR BLD AUTO: 44.5 % (ref 40–54)
HDLC SERPL-MCNC: 64 MG/DL (ref 40–75)
HDLC SERPL: 45.4 % (ref 20–50)
HGB BLD-MCNC: 13.7 G/DL (ref 14–18)
IMM GRANULOCYTES # BLD AUTO: 0.01 K/UL (ref 0–0.04)
IMM GRANULOCYTES NFR BLD AUTO: 0.1 % (ref 0–0.5)
INR PPP: 2.1 (ref 0.8–1.2)
LDH SERPL L TO P-CCNC: 170 U/L (ref 110–260)
LDLC SERPL CALC-MCNC: 60.8 MG/DL (ref 63–159)
LYMPHOCYTES # BLD AUTO: 1.5 K/UL (ref 1–4.8)
LYMPHOCYTES NFR BLD: 17.2 % (ref 18–48)
MAGNESIUM SERPL-MCNC: 1.9 MG/DL (ref 1.6–2.6)
MCH RBC QN AUTO: 26.8 PG (ref 27–31)
MCHC RBC AUTO-ENTMCNC: 30.8 G/DL (ref 32–36)
MCV RBC AUTO: 87 FL (ref 82–98)
MONOCYTES # BLD AUTO: 0.8 K/UL (ref 0.3–1)
MONOCYTES NFR BLD: 8.8 % (ref 4–15)
NEUTROPHILS # BLD AUTO: 6.1 K/UL (ref 1.8–7.7)
NEUTROPHILS NFR BLD: 71.1 % (ref 38–73)
NONHDLC SERPL-MCNC: 77 MG/DL
NRBC BLD-RTO: 0 /100 WBC
PHOSPHATE SERPL-MCNC: 2.6 MG/DL (ref 2.7–4.5)
PLATELET # BLD AUTO: 284 K/UL (ref 150–350)
PMV BLD AUTO: 9.7 FL (ref 9.2–12.9)
POTASSIUM SERPL-SCNC: 3.9 MMOL/L (ref 3.5–5.1)
PREALB SERPL-MCNC: 24 MG/DL (ref 20–43)
PROT SERPL-MCNC: 8 G/DL (ref 6–8.4)
PROTHROMBIN TIME: 22.5 SEC (ref 9–12.5)
RBC # BLD AUTO: 5.11 M/UL (ref 4.6–6.2)
SODIUM SERPL-SCNC: 141 MMOL/L (ref 136–145)
TRIGL SERPL-MCNC: 81 MG/DL (ref 30–150)
WBC # BLD AUTO: 8.61 K/UL (ref 3.9–12.7)

## 2020-09-02 PROCEDURE — 80053 COMPREHEN METABOLIC PANEL: CPT

## 2020-09-02 PROCEDURE — 99999 PR PBB SHADOW E&M-EST. PATIENT-LVL IV: CPT | Mod: PBBFAC,,, | Performed by: INTERNAL MEDICINE

## 2020-09-02 PROCEDURE — 99215 PR OFFICE/OUTPT VISIT, EST, LEVL V, 40-54 MIN: ICD-10-PCS | Mod: S$GLB,,, | Performed by: INTERNAL MEDICINE

## 2020-09-02 PROCEDURE — 86140 C-REACTIVE PROTEIN: CPT

## 2020-09-02 PROCEDURE — 85025 COMPLETE CBC W/AUTO DIFF WBC: CPT

## 2020-09-02 PROCEDURE — 84100 ASSAY OF PHOSPHORUS: CPT

## 2020-09-02 PROCEDURE — 84134 ASSAY OF PREALBUMIN: CPT

## 2020-09-02 PROCEDURE — 82248 BILIRUBIN DIRECT: CPT

## 2020-09-02 PROCEDURE — 83615 LACTATE (LD) (LDH) ENZYME: CPT

## 2020-09-02 PROCEDURE — 99215 OFFICE O/P EST HI 40 MIN: CPT | Mod: S$GLB,,, | Performed by: INTERNAL MEDICINE

## 2020-09-02 PROCEDURE — 80061 LIPID PANEL: CPT

## 2020-09-02 PROCEDURE — 99999 PR PBB SHADOW E&M-EST. PATIENT-LVL I: ICD-10-PCS | Mod: PBBFAC,,,

## 2020-09-02 PROCEDURE — 99999 PR PBB SHADOW E&M-EST. PATIENT-LVL I: CPT | Mod: PBBFAC,,,

## 2020-09-02 PROCEDURE — 83735 ASSAY OF MAGNESIUM: CPT

## 2020-09-02 PROCEDURE — 85610 PROTHROMBIN TIME: CPT

## 2020-09-02 PROCEDURE — 99999 PR PBB SHADOW E&M-EST. PATIENT-LVL IV: ICD-10-PCS | Mod: PBBFAC,,, | Performed by: INTERNAL MEDICINE

## 2020-09-02 PROCEDURE — 36415 COLL VENOUS BLD VENIPUNCTURE: CPT

## 2020-09-02 PROCEDURE — 83880 ASSAY OF NATRIURETIC PEPTIDE: CPT

## 2020-09-02 RX ORDER — CLOTRIMAZOLE AND BETAMETHASONE DIPROPIONATE 10; .64 MG/G; MG/G
CREAM TOPICAL 2 TIMES DAILY
Qty: 1 TUBE | Refills: 0 | Status: ON HOLD | OUTPATIENT
Start: 2020-09-02 | End: 2021-01-01 | Stop reason: HOSPADM

## 2020-09-02 NOTE — LETTER
September 2, 2020        Miko Catalan  [address] [facility] [address]  Phone: [phone]  Fax: [phone]             [id number]  [address]  [facility] [address]  Phone: [phone]   Patient: Saba Lott   MR Number: [id number]   YOB: 1965   Date of Visit: 9/2/2020       Dear Dr. Miko Catalan    Thank you for referring Saba Lott to me for evaluation. Attached you will find relevant portions of my assessment and plan of care.    If you have questions, please do not hesitate to call me. I look forward to following Saba Lott along with you.    Sincerely,    Santosh Avlaos MD    Enclosure    If you would like to receive this communication electronically, please contact [email or web address] or [phone] to request [employer] access.    [employer] is a tool which provides read-only access to select patient information with whom you have a relationship. Its easy to use and provides real time access to review your patients record including encounter summaries, notes, results, and demographic information.    If you feel you have received this communication in error or would no longer like to receive these types of communications, please e-mail [email or web address]
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
## 2020-09-04 NOTE — PROGRESS NOTES
"Date of Implant with Heartmate 3 LVAD: 20    PATIENT ARRIVED IN CLINIC:  Ambulatory   Accompanied by: self    Vitals  Temperature, oral:   Temp Readings from Last 1 Encounters:   20 98.2 °F (36.8 °C)     Blood Pressure:   BP Readings from Last 3 Encounters:   20 (!) 88/0   20 (!) 80/0   20 128/82        VAD Interrogation:  TXP CHRISTY INTERROGATIONS 2020   Type HeartMate3 HeartMate3 HeartMate3   Flow 4.2 4.2 3.8   Speed 5300 5300 5300   PI 4.9 4.2 5.5   Power (Centeno) 4.0 4.1 4.1   LSL 4900 4900 4900   Pulsatility No Pulse No Pulse Intermittent pulse       History Lo.c3e  Problems / Issues / Alarms with VAD if any: None noted  VAD Sounds: HM3   Heartmate 3 Module Cable:  No yellow exposed and Attempted to unscrew modular cable to ensure it will be able to come lose in the event we ever need to change the modular cable while patient held the driveline in place so it would not move. Modular cable connection able to be unscrewed and re-tightened. Instructed pt to perform this weekly.    HCT:   Lab Results   Component Value Date    HCT 44.5 2020    HCT 30 (L) 2020       Complaints/reason for visit today: routine  Emergency Equipment With Patient: yes   VAD Binder With Patient: no   Reviewed VAD Numbers In Binder: no    Any Equipment Issues: None noted (Refer to  note for complete details)    DLES Assessment:  Appearance Of Driveline: "1"            Antibiotics: NO, started pt on Lotrisone x 1 week topical to skin surrounding DLES.  Directions given to pt on how to apply and cover  Velour: no  Manual & Visual Inspection Of Driveline: No kinks or tears noted  Stabilization Device In Use: yes, barton securement device      Patient MyChart Questionnaire: No flowsheet data found.     Assessment:   PAIN: NO  Complaints Of Nausea / Vomiting: None noted    Appearance and Frequency Of Stools: normal and formed without blood & daily  Color " Of Urine: clear/yellow  Coping/Depression/Anxiety: coping okay  Sleep Habits: 6 hrs /night  Sleep Aids: None noted  Showering: Yes, reminded to change dressing immediately after drying off  Activity/Exercise: walking   Driving: Yes. Reminded to pull over should there be an alarm before looking down at controller.    DLES Dressing Care:   Frequency of Dressing Changes: daily & daily kit  Pt In Need Of Management Kits?:yes -   2 Box of daily kit  It is medically necessary to have VAD management kits in order to prevent infection or to assist in the healing of an infected DLES.    Labs:    Chemistry        Component Value Date/Time     09/02/2020 1309     07/21/2020    K 3.9 09/02/2020 1309    K 3.9 07/21/2020     09/02/2020 1309    CL 99 07/21/2020    CO2 26 09/02/2020 1309    CO2 22 07/21/2020    BUN 15 09/02/2020 1309    BUN 12 07/21/2020    CREATININE 0.8 09/02/2020 1309    CREATININE 1.0 07/21/2020    GLU 68 (L) 09/02/2020 1309        Component Value Date/Time    CALCIUM 8.9 09/02/2020 1309    CALCIUM 8.6 07/21/2020    ALKPHOS 127 09/02/2020 1309    AST 17 09/02/2020 1309    AST 19 04/27/2020    ALT 18 09/02/2020 1309    ALT 18 05/04/2020    ALT 16 04/27/2020    BILITOT 0.5 09/02/2020 1309    ESTGFRAFRICA >60.0 09/02/2020 1309    EGFRNONAA >60.0 09/02/2020 1309            Magnesium   Date Value Ref Range Status   09/02/2020 1.9 1.6 - 2.6 mg/dL Final       Lab Results   Component Value Date    WBC 8.61 09/02/2020    HGB 13.7 (L) 09/02/2020    HCT 44.5 09/02/2020    MCV 87 09/02/2020     09/02/2020       Lab Results   Component Value Date    INR 2.1 (H) 09/02/2020    INR 1.8 08/31/2020    INR 2.6 08/19/2020       BNP   Date Value Ref Range Status   09/02/2020 120 (H) 0 - 99 pg/mL Final     Comment:     Values of less than 100 pg/ml are consistent with non-CHF populations.   07/29/2020 90 0 - 99 pg/mL Final     Comment:     Values of less than 100 pg/ml are consistent with non-CHF  populations.   06/19/2020 88 0 - 99 pg/mL Final     Comment:     Values of less than 100 pg/ml are consistent with non-CHF populations.       LD   Date Value Ref Range Status   09/02/2020 170 110 - 260 U/L Final     Comment:     Results are increased in hemolyzed samples.   07/29/2020 163 110 - 260 U/L Final     Comment:     Results are increased in hemolyzed samples.   05/28/2020 183 110 - 260 U/L Final     Comment:     Results are increased in hemolyzed samples.       Labs reviewed with patient: YES      Patient Satisfaction Survey completed per patient: No  (explained about signature and box to check)  Medication reconciliation: per MA.  Medication Detail updated today: no  Coumadin Managed by: [facility]    Education: Reviewed driveline care, emergency procedures, how to change the controller, alarms with patient, as well as discussed how to page the VAD coordinator in case of an emergency.   Coved - 19 education: Reminded patient/caregiver to check temperature daily and call us if it is > 99.0.  Reminded them  to stay 6 feet away from other people, wash hands frequently, don't touch your face and stay home except yo get labs, medications, and appts.      Plans/Needs: Pt was scheduled for an echo today, but didn't go.  He had difficulty understanding his appt.  Educated him on how to find details of appts on his phone.       Hurricane Season: Yes, discussed with patient: With hurricane season approaching, we want to make sure you are fully prepared for any emergency.  Should the National Weather Service or your local authorities recommend a voluntary or mandatory evacuation of your area, The VAD team requires you to evacuate to a safe place.  Remember, when it is a mandatory evacuation, traffic will become an issue for your limited battery power.  Therefore, we strongly urge you to evacuate early.    The VAD team advises you to have the following in place before hurricane season:  Have an evacuation  plan in place including places to evacuate, names and phone numbers.  This information is required to be given to the VAD coordinator.   1. Have your VAD emergency contact numbers with you.   2. Make sure your prescriptions will not run out by the end of September.   3. Make sure you have enough medications, including pills, inhalers, patches,   etc. to take, should you be gone for more than 2 weeks.   4. Make sure ALL of your batteries are fully charged.   5. Bring enough dressing change supplies to last for at least 2 weeks.   6. Bring your VAD binder with you.  Make sure your binder is updated and complete with alarms reference card, patient hand book, emergency contact numbers, daily log sheets, etc.  If you do not have family or friends as an evacuation destination, we recommend evacuating to a safe area.   Do NOT evacuate to [facility].    The VAD team wants to stress the importance of planning for your evacuation in the event of a hurricane.  If you have any LVAD questions or issues, please contact the LVAD coordinator.

## 2020-09-08 DIAGNOSIS — Z95.811 LVAD (LEFT VENTRICULAR ASSIST DEVICE) PRESENT: Primary | ICD-10-CM

## 2020-09-09 ENCOUNTER — ANTI-COAG VISIT (OUTPATIENT)
Dept: CARDIOLOGY | Facility: CLINIC | Age: 55
End: 2020-09-09
Payer: COMMERCIAL

## 2020-09-09 DIAGNOSIS — Z95.811 LVAD (LEFT VENTRICULAR ASSIST DEVICE) PRESENT: ICD-10-CM

## 2020-09-09 LAB — INR PPP: 1.7

## 2020-09-09 PROCEDURE — 93793 ANTICOAG MGMT PT WARFARIN: CPT | Mod: S$GLB,,,

## 2020-09-09 PROCEDURE — 93793 PR ANTICOAGULANT MGMT FOR PT TAKING WARFARIN: ICD-10-PCS | Mod: S$GLB,,,

## 2020-09-09 NOTE — PROGRESS NOTES
Verbal result taken from [facility]_________. PT/INR _1.7______ Date drawn_09/09/20_______ Hardcopy to be faxed.

## 2020-09-09 NOTE — PROGRESS NOTES
Spoke with Kym at [facility] . Pt was seen by  on 9/8/20 but is unsure if the nurse margie PT/INR. She will have the nurse call us when she returns to the office this morning.

## 2020-09-11 ENCOUNTER — TELEPHONE (OUTPATIENT)
Dept: TRANSPLANT | Facility: CLINIC | Age: 55
End: 2020-09-11

## 2020-09-11 NOTE — TELEPHONE ENCOUNTER
CONTACTED [facility] TO OBTAIN WEEKLY LAB RESULTS    SPOKE WITH LANDEN    WAS INFORMED THAT THE LABS WILL BE DRAWN ON Monday. WKLY LABS WERE NOT DRAWN THIS WK    THIS INFORMATION WILL BE RELAYED TO VAD COORDINATOR.

## 2020-09-11 NOTE — TELEPHONE ENCOUNTER
----- Message from Cindy Armstrong RN sent at 8/5/2020  1:14 PM CDT -----  Regarding: Weekly CBC, CMP, Mg, Phos, BNP  [facility]  (ph: [phone], f: [phone])   ----- Message -----  From: Shani Cagle RN  Sent: 4/13/2020  12:59 PM CDT  To: [facility] Lvad Clinical  Subject: Weekly  Labs

## 2020-09-14 ENCOUNTER — ANTI-COAG VISIT (OUTPATIENT)
Dept: CARDIOLOGY | Facility: CLINIC | Age: 55
End: 2020-09-14
Payer: COMMERCIAL

## 2020-09-14 DIAGNOSIS — Z95.811 LVAD (LEFT VENTRICULAR ASSIST DEVICE) PRESENT: ICD-10-CM

## 2020-09-14 LAB — INR PPP: 2.7

## 2020-09-14 PROCEDURE — 93793 PR ANTICOAGULANT MGMT FOR PT TAKING WARFARIN: ICD-10-PCS | Mod: S$GLB,,,

## 2020-09-14 PROCEDURE — 93793 ANTICOAG MGMT PT WARFARIN: CPT | Mod: S$GLB,,,

## 2020-09-14 NOTE — PROGRESS NOTES
INR at goal. Medications and chart reviewed. No changes noted to necessitate adjustment of warfarin or follow-up plan. See calendar.        normal...

## 2020-09-21 LAB
BNP SERPL-MCNC: 132.8 PG/ML
EXT ALBUMIN: 4.4
EXT ALT: 20
EXT AST: 19
EXT BUN: 12
EXT CALCIUM: 9.5
EXT CHLORIDE: 102
EXT CREATININE: 0.75 MG/DL
EXT GLUCOSE: 101
EXT HEMATOCRIT: 44.2
EXT HEMOGLOBIN: 14
EXT MAGNESIUM: 1.8
EXT PLATELETS: 306
EXT POTASSIUM: 4.1
EXT PROTEIN TOTAL: 7.4
EXT SODIUM: 138 MMOL/L
EXT WBC: 9.4
PHOSPHATE FLD-MCNC: 3.4 MG/DL (ref 2.5–4.9)

## 2020-09-22 ENCOUNTER — TELEPHONE (OUTPATIENT)
Dept: TRANSPLANT | Facility: CLINIC | Age: 55
End: 2020-09-22

## 2020-09-22 NOTE — PROGRESS NOTES
Spoke with Ms. Morales at the  said she do not know what happen to the tube. She states she do not know what the lab did with the patient sample. They will have someone come out tomorrow to redraw. Called the patient wife Mrs. Lott and notified her of the situation and the redraw for tomorrow 9/23/2020. Verbalized understanding. Order faxed to

## 2020-09-22 NOTE — TELEPHONE ENCOUNTER
----- Message from Cindy Armstrong RN sent at 8/5/2020  1:14 PM CDT -----  Regarding: Weekly CBC, CMP, Mg, Phos, BNP  [facility]  (ph: [phone], f: [phone])   ----- Message -----  From: Shani Cagle RN  Sent: 4/13/2020  12:59 PM CDT  To: [facility] Lvad Clinical  Subject: Weekly  Labs

## 2020-09-23 ENCOUNTER — ANTI-COAG VISIT (OUTPATIENT)
Dept: CARDIOLOGY | Facility: CLINIC | Age: 55
End: 2020-09-23
Payer: COMMERCIAL

## 2020-09-23 DIAGNOSIS — Z95.811 LVAD (LEFT VENTRICULAR ASSIST DEVICE) PRESENT: ICD-10-CM

## 2020-09-23 LAB — INR PPP: 2.3

## 2020-09-23 PROCEDURE — 93793 ANTICOAG MGMT PT WARFARIN: CPT | Mod: S$GLB,,,

## 2020-09-23 PROCEDURE — 93793 PR ANTICOAGULANT MGMT FOR PT TAKING WARFARIN: ICD-10-PCS | Mod: S$GLB,,,

## 2020-09-23 NOTE — PROGRESS NOTES
KELLI Morrison LPN with [facility] faxed INR result and message that due to patient's insurance authorization issues they will not be doing home visits until the authorization is obtained.  Chart routed to Uma Landon to review.

## 2020-09-28 ENCOUNTER — DOCUMENTATION ONLY (OUTPATIENT)
Dept: TRANSPLANT | Facility: CLINIC | Age: 55
End: 2020-09-28

## 2020-09-28 ENCOUNTER — HOSPITAL ENCOUNTER (OUTPATIENT)
Dept: CARDIOLOGY | Facility: HOSPITAL | Age: 55
Discharge: HOME | End: 2020-09-28
Attending: INTERNAL MEDICINE
Payer: COMMERCIAL

## 2020-09-28 VITALS — HEIGHT: 67 IN | BODY MASS INDEX: 32.18 KG/M2 | WEIGHT: 205 LBS | HEART RATE: 79 BPM

## 2020-09-28 DIAGNOSIS — Z95.811 HEART REPLACED BY HEART ASSIST DEVICE: ICD-10-CM

## 2020-09-28 LAB
ASCENDING AORTA: 3.05 CM
BSA FOR ECHO PROCEDURE: 2.1 M2
CV ECHO LV RWT: 0.22 CM
DOP CALC LVOT AREA: 3.4 CM2
DOP CALC LVOT DIAMETER: 2.07 CM
E WAVE DECELERATION TIME: 305.71 MSEC
E/A RATIO: 0.98
E/E' RATIO: 13.07 M/S
ECHO LV POSTERIOR WALL: 0.67 CM (ref 0.6–1.1)
FRACTIONAL SHORTENING: 17 % (ref 28–44)
INTERVENTRICULAR SEPTUM: 0.55 CM (ref 0.6–1.1)
IVRT: 114.18 MSEC
LA MAJOR: 6.48 CM
LA MINOR: 6.4 CM
LA WIDTH: 4.56 CM
LEFT ATRIUM SIZE: 4.38 CM
LEFT ATRIUM VOLUME INDEX: 53.5 ML/M2
LEFT ATRIUM VOLUME: 109.33 CM3
LEFT INTERNAL DIMENSION IN SYSTOLE: 5.17 CM (ref 2.1–4)
LEFT VENTRICLE DIASTOLIC VOLUME INDEX: 95.71 ML/M2
LEFT VENTRICLE DIASTOLIC VOLUME: 195.58 ML
LEFT VENTRICLE MASS INDEX: 70 G/M2
LEFT VENTRICLE SYSTOLIC VOLUME INDEX: 62.6 ML/M2
LEFT VENTRICLE SYSTOLIC VOLUME: 127.99 ML
LEFT VENTRICULAR INTERNAL DIMENSION IN DIASTOLE: 6.22 CM (ref 3.5–6)
LEFT VENTRICULAR MASS: 143.03 G
LV LATERAL E/E' RATIO: 12.25 M/S
LV SEPTAL E/E' RATIO: 14 M/S
MV PEAK A VEL: 1 M/S
MV PEAK E VEL: 0.98 M/S
MV STENOSIS PRESSURE HALF TIME: 88.66 MS
MV VALVE AREA P 1/2 METHOD: 2.48 CM2
PISA MRMAX VEL: 0.04 M/S
PISA TR MAX VEL: 2.49 M/S
PULM VEIN S/D RATIO: 1.29
PV PEAK D VEL: 0.42 M/S
PV PEAK S VEL: 0.54 M/S
RA MAJOR: 5.04 CM
RA PRESSURE: 3 MMHG
RA WIDTH: 4.64 CM
RIGHT VENTRICULAR END-DIASTOLIC DIMENSION: 4.77 CM
RV TISSUE DOPPLER FREE WALL SYSTOLIC VELOCITY 1 (APICAL 4 CHAMBER VIEW): 8.13 CM/S
SINUS: 3.18 CM
STJ: 2.57 CM
TDI LATERAL: 0.08 M/S
TDI SEPTAL: 0.07 M/S
TDI: 0.08 M/S
TR MAX PG: 25 MMHG
TRICUSPID ANNULAR PLANE SYSTOLIC EXCURSION: 1.18 CM
TV REST PULMONARY ARTERY PRESSURE: 28 MMHG

## 2020-09-28 PROCEDURE — 93306 ECHO (CUPID ONLY): ICD-10-PCS | Mod: 26,,, | Performed by: INTERNAL MEDICINE

## 2020-09-28 PROCEDURE — 93306 TTE W/DOPPLER COMPLETE: CPT

## 2020-09-28 PROCEDURE — 93306 TTE W/DOPPLER COMPLETE: CPT | Mod: 26,,, | Performed by: INTERNAL MEDICINE

## 2020-09-28 NOTE — PROGRESS NOTES
Patient was to have appointment today but confused and did not come for visit, just had echo.  I reviewed the echo and feel that the IVS moves slightly towards the RV in systole; LVEDD 6.2 and LVESD 5.2; Ricardo swain with mild MR.  Depending upon his clinical status one might consider increasing his speed at next visit.

## 2020-09-29 ENCOUNTER — ANTI-COAG VISIT (OUTPATIENT)
Dept: CARDIOLOGY | Facility: CLINIC | Age: 55
End: 2020-09-29
Payer: MEDICARE

## 2020-09-29 DIAGNOSIS — Z95.811 LVAD (LEFT VENTRICULAR ASSIST DEVICE) PRESENT: ICD-10-CM

## 2020-09-29 LAB — INR PPP: 2.2

## 2020-09-29 PROCEDURE — 93793 ANTICOAG MGMT PT WARFARIN: CPT | Mod: S$GLB,,,

## 2020-09-29 PROCEDURE — 93793 PR ANTICOAGULANT MGMT FOR PT TAKING WARFARIN: ICD-10-PCS | Mod: S$GLB,,,

## 2020-10-01 ENCOUNTER — TELEPHONE (OUTPATIENT)
Dept: TRANSPLANT | Facility: CLINIC | Age: 55
End: 2020-10-01

## 2020-10-01 NOTE — TELEPHONE ENCOUNTER
Contacted [facility] to obtain patient lab results    Spoke with Catrachita who informed that an attempt was made but they were unable to draw. No other reason  Or information provided.

## 2020-10-07 ENCOUNTER — SOCIAL WORK (OUTPATIENT)
Dept: TRANSPLANT | Facility: CLINIC | Age: 55
End: 2020-10-07

## 2020-10-07 ENCOUNTER — TELEPHONE (OUTPATIENT)
Dept: TRANSPLANT | Facility: CLINIC | Age: 55
End: 2020-10-07

## 2020-10-07 NOTE — PROGRESS NOTES
Per request of [facility]  ph [phone], fax [phone] and VAD coordinators, new orders were faxed because pt's insurance changed to [employer] and he is no longer on infusion medication. SW remains available.

## 2020-10-07 NOTE — TELEPHONE ENCOUNTER
Pt changed his insurance and home health is no longer able to care for him.  I called pt to discuss with him.  He doesn't have a car or any way to get to lab for labs. Pt needs weekly INRs.  Will ask  to submit home health orders.

## 2020-10-09 NOTE — PROGRESS NOTES
Patient reports HH didn't come out this week due to him changing insurance . They will be back out Monday new order faxed for 10/12/2020.

## 2020-10-12 ENCOUNTER — ANTI-COAG VISIT (OUTPATIENT)
Dept: CARDIOLOGY | Facility: CLINIC | Age: 55
End: 2020-10-12
Payer: MEDICARE

## 2020-10-12 DIAGNOSIS — Z95.811 LVAD (LEFT VENTRICULAR ASSIST DEVICE) PRESENT: ICD-10-CM

## 2020-10-12 LAB
BNP: 84
EXT ALBUMIN: 4.2
EXT ALT: 42
EXT AST: 29
EXT BUN: 14
EXT CALCIUM: 9.4
EXT CHLORIDE: 102
EXT CREATININE: 0.8 MG/DL
EXT GLUCOSE: 96
EXT HEMATOCRIT: 42.5
EXT HEMOGLOBIN: 13.6
EXT MAGNESIUM: 1.8
EXT PLATELETS: 302
EXT POTASSIUM: 4
EXT PROTEIN TOTAL: 7.7
EXT SODIUM: 137 MMOL/L
EXT WBC: 10.5
INR PPP: 1.87

## 2020-10-12 PROCEDURE — 93793 ANTICOAG MGMT PT WARFARIN: CPT | Mod: S$GLB,,,

## 2020-10-12 PROCEDURE — 93793 PR ANTICOAGULANT MGMT FOR PT TAKING WARFARIN: ICD-10-PCS | Mod: S$GLB,,,

## 2020-10-13 ENCOUNTER — TELEPHONE (OUTPATIENT)
Dept: TRANSPLANT | Facility: CLINIC | Age: 55
End: 2020-10-13

## 2020-10-14 NOTE — TELEPHONE ENCOUNTER
Patient Labs reviewed, ALT noted to be slightly elevated, appears to be an outlier, spoke with Dr. Tafoya who stated to continue to monitor trends.

## 2020-10-15 ENCOUNTER — OFFICE VISIT (OUTPATIENT)
Dept: TRANSPLANT | Facility: CLINIC | Age: 55
End: 2020-10-15
Payer: MEDICARE

## 2020-10-15 ENCOUNTER — CLINICAL SUPPORT (OUTPATIENT)
Dept: TRANSPLANT | Facility: CLINIC | Age: 55
End: 2020-10-15
Payer: MEDICARE

## 2020-10-15 ENCOUNTER — ANTI-COAG VISIT (OUTPATIENT)
Dept: CARDIOLOGY | Facility: CLINIC | Age: 55
End: 2020-10-15
Payer: MEDICARE

## 2020-10-15 ENCOUNTER — LAB VISIT (OUTPATIENT)
Dept: LAB | Facility: HOSPITAL | Age: 55
End: 2020-10-15
Attending: INTERNAL MEDICINE
Payer: MEDICARE

## 2020-10-15 VITALS — TEMPERATURE: 98 F | WEIGHT: 204 LBS | HEIGHT: 67 IN | BODY MASS INDEX: 32.02 KG/M2 | SYSTOLIC BLOOD PRESSURE: 80 MMHG

## 2020-10-15 DIAGNOSIS — Z95.810 AICD (AUTOMATIC CARDIOVERTER/DEFIBRILLATOR) PRESENT: ICD-10-CM

## 2020-10-15 DIAGNOSIS — Z95.811 LVAD (LEFT VENTRICULAR ASSIST DEVICE) PRESENT: ICD-10-CM

## 2020-10-15 DIAGNOSIS — Z95.811 HEART REPLACED BY HEART ASSIST DEVICE: ICD-10-CM

## 2020-10-15 DIAGNOSIS — I42.8 NON-ISCHEMIC CARDIOMYOPATHY: ICD-10-CM

## 2020-10-15 DIAGNOSIS — I50.42 CHRONIC COMBINED SYSTOLIC AND DIASTOLIC HEART FAILURE: Primary | ICD-10-CM

## 2020-10-15 LAB
ALBUMIN SERPL BCP-MCNC: 4.5 G/DL (ref 3.5–5.2)
ALP SERPL-CCNC: 154 U/L (ref 55–135)
ALT SERPL W/O P-5'-P-CCNC: 33 U/L (ref 10–44)
ANION GAP SERPL CALC-SCNC: 10 MMOL/L (ref 8–16)
AST SERPL-CCNC: 21 U/L (ref 10–40)
BASOPHILS # BLD AUTO: 0.03 K/UL (ref 0–0.2)
BASOPHILS NFR BLD: 0.3 % (ref 0–1.9)
BILIRUB DIRECT SERPL-MCNC: 0.4 MG/DL (ref 0.1–0.3)
BILIRUB SERPL-MCNC: 0.7 MG/DL (ref 0.1–1)
BNP SERPL-MCNC: 145 PG/ML (ref 0–99)
BUN SERPL-MCNC: 11 MG/DL (ref 6–20)
CALCIUM SERPL-MCNC: 9.7 MG/DL (ref 8.7–10.5)
CHLORIDE SERPL-SCNC: 101 MMOL/L (ref 95–110)
CO2 SERPL-SCNC: 29 MMOL/L (ref 23–29)
CREAT SERPL-MCNC: 0.9 MG/DL (ref 0.5–1.4)
CRP SERPL-MCNC: 40.3 MG/L (ref 0–8.2)
DIFFERENTIAL METHOD: ABNORMAL
EOSINOPHIL # BLD AUTO: 0.1 K/UL (ref 0–0.5)
EOSINOPHIL NFR BLD: 1.4 % (ref 0–8)
ERYTHROCYTE [DISTWIDTH] IN BLOOD BY AUTOMATED COUNT: 14.1 % (ref 11.5–14.5)
EST. GFR  (AFRICAN AMERICAN): >60 ML/MIN/1.73 M^2
EST. GFR  (NON AFRICAN AMERICAN): >60 ML/MIN/1.73 M^2
GLUCOSE SERPL-MCNC: 92 MG/DL (ref 70–110)
HCT VFR BLD AUTO: 46 % (ref 40–54)
HGB BLD-MCNC: 14 G/DL (ref 14–18)
IMM GRANULOCYTES # BLD AUTO: 0.04 K/UL (ref 0–0.04)
IMM GRANULOCYTES NFR BLD AUTO: 0.4 % (ref 0–0.5)
INR PPP: 2.6 (ref 0.8–1.2)
INR PPP: 3
LDH SERPL L TO P-CCNC: 175 U/L (ref 110–260)
LYMPHOCYTES # BLD AUTO: 1.6 K/UL (ref 1–4.8)
LYMPHOCYTES NFR BLD: 17.3 % (ref 18–48)
MAGNESIUM SERPL-MCNC: 1.8 MG/DL (ref 1.6–2.6)
MCH RBC QN AUTO: 26.6 PG (ref 27–31)
MCHC RBC AUTO-ENTMCNC: 30.4 G/DL (ref 32–36)
MCV RBC AUTO: 88 FL (ref 82–98)
MONOCYTES # BLD AUTO: 0.6 K/UL (ref 0.3–1)
MONOCYTES NFR BLD: 6.9 % (ref 4–15)
NEUTROPHILS # BLD AUTO: 6.8 K/UL (ref 1.8–7.7)
NEUTROPHILS NFR BLD: 73.7 % (ref 38–73)
NRBC BLD-RTO: 0 /100 WBC
PHOSPHATE SERPL-MCNC: 3.4 MG/DL (ref 2.7–4.5)
PLATELET # BLD AUTO: 304 K/UL (ref 150–350)
PMV BLD AUTO: 9.4 FL (ref 9.2–12.9)
POTASSIUM SERPL-SCNC: 3.7 MMOL/L (ref 3.5–5.1)
PREALB SERPL-MCNC: 20 MG/DL (ref 20–43)
PROT SERPL-MCNC: 8.9 G/DL (ref 6–8.4)
PROTHROMBIN TIME: 27.8 SEC (ref 9–12.5)
RBC # BLD AUTO: 5.26 M/UL (ref 4.6–6.2)
SODIUM SERPL-SCNC: 140 MMOL/L (ref 136–145)
WBC # BLD AUTO: 9.19 K/UL (ref 3.9–12.7)

## 2020-10-15 PROCEDURE — 84100 ASSAY OF PHOSPHORUS: CPT

## 2020-10-15 PROCEDURE — 85025 COMPLETE CBC W/AUTO DIFF WBC: CPT

## 2020-10-15 PROCEDURE — 99214 PR OFFICE/OUTPT VISIT, EST, LEVL IV, 30-39 MIN: ICD-10-PCS | Mod: S$GLB,,, | Performed by: INTERNAL MEDICINE

## 2020-10-15 PROCEDURE — 84134 ASSAY OF PREALBUMIN: CPT

## 2020-10-15 PROCEDURE — 86140 C-REACTIVE PROTEIN: CPT

## 2020-10-15 PROCEDURE — 99999 PR PBB SHADOW E&M-EST. PATIENT-LVL IV: CPT | Mod: PBBFAC,,, | Performed by: INTERNAL MEDICINE

## 2020-10-15 PROCEDURE — 93750 INTERROGATION VAD IN PERSON: CPT | Mod: S$GLB,,, | Performed by: INTERNAL MEDICINE

## 2020-10-15 PROCEDURE — 83615 LACTATE (LD) (LDH) ENZYME: CPT

## 2020-10-15 PROCEDURE — 99214 OFFICE O/P EST MOD 30 MIN: CPT | Mod: S$GLB,,, | Performed by: INTERNAL MEDICINE

## 2020-10-15 PROCEDURE — 80053 COMPREHEN METABOLIC PANEL: CPT

## 2020-10-15 PROCEDURE — 93750 OP LVAD INTERROGATION: ICD-10-PCS | Mod: S$GLB,,, | Performed by: INTERNAL MEDICINE

## 2020-10-15 PROCEDURE — 83880 ASSAY OF NATRIURETIC PEPTIDE: CPT

## 2020-10-15 PROCEDURE — 99999 PR PBB SHADOW E&M-EST. PATIENT-LVL IV: ICD-10-PCS | Mod: PBBFAC,,, | Performed by: INTERNAL MEDICINE

## 2020-10-15 PROCEDURE — 36415 COLL VENOUS BLD VENIPUNCTURE: CPT

## 2020-10-15 PROCEDURE — 83735 ASSAY OF MAGNESIUM: CPT

## 2020-10-15 PROCEDURE — 85610 PROTHROMBIN TIME: CPT

## 2020-10-15 PROCEDURE — 82248 BILIRUBIN DIRECT: CPT

## 2020-10-15 RX ORDER — FUROSEMIDE 20 MG/1
60 TABLET ORAL ONCE AS NEEDED
COMMUNITY
Start: 2019-12-22 | End: 2020-11-30 | Stop reason: SDUPTHER

## 2020-10-15 RX ORDER — POTASSIUM CHLORIDE 20 MEQ/1
20 TABLET, EXTENDED RELEASE ORAL DAILY
Qty: 60 TABLET | Refills: 1 | Status: SHIPPED | OUTPATIENT
Start: 2020-10-15 | End: 2020-11-30 | Stop reason: SDUPTHER

## 2020-10-15 NOTE — PROGRESS NOTES
"Subjective:   Patient ID:  Saba Lott is a 54 y.o. male who presents for LVAD followup visit.      Implant Date: 2/6/2020     Heartmate 3 RPM 5300     INR goal: 2-3   Bridge with Heparin   Antiplatelets:    Inotropes:    TXP CHRISTY INTERROGATIONS 10/15/2020   Type HeartMate3   Flow 4.4   Speed 5300   PI 4.2   Power (Centeno) 4.1   LSL 4900   Pulsatility No Pulse       HPI  Mr. Lott is a very pleasant 53 year old AAM with DCM, HTN, tobacco and ETOH abuse (quit 2018), who presented to the hospital in January with ADHF/cardiogenic shock, was satrted on CRRT, had IABP placed, recovered from this from renal standpoint and labs, and proceeded with LVAD impalnt 02/06. Post op course was complicated by some RV failure requiring dobutamine, high dose diuretics, but was eventually weaned off successfully, with PICC and  weaned off as well. Patient had some post op thrombocytosis however heme onc consulted and felt it was reactive. Now has resolved and he has done well since. Comes for his regular clinic visit. Doing well. Has no complaints.  VAD speed is at 5300 rpm. No VAD alarms noted on interrogation occasional PI events. BP is 80. DLES is a "1". INR is therapeutic at 2.6. LDH is 175 at baseline.     Review of Systems   Constitution: Negative. Negative for chills, decreased appetite, diaphoresis, fever, malaise/fatigue, night sweats, weight gain and weight loss.   Eyes: Negative.    Cardiovascular: Negative for chest pain, claudication, cyanosis, dyspnea on exertion, irregular heartbeat, leg swelling, near-syncope, orthopnea, palpitations, paroxysmal nocturnal dyspnea and syncope.   Respiratory: Negative for cough, hemoptysis and shortness of breath.    Endocrine: Negative.    Hematologic/Lymphatic: Negative.    Skin: Negative for color change, dry skin and nail changes.   Musculoskeletal: Negative.    Gastrointestinal: Negative.    Genitourinary: Negative.    Neurological: Negative for weakness.       Objective: " "  Blood pressure (!) 80/0, temperature 98.2 °F (36.8 °C), height 5' 7" (1.702 m), weight 92.5 kg (204 lb).body mass index is 31.95 kg/m².    Doppler: 80 mm of Hg    Physical Exam   Constitutional: He appears well-developed.   BP (!) 80/0   Temp 98.2 °F (36.8 °C)   Ht 5' 7" (1.702 m)   Wt 92.5 kg (204 lb)   BMI 31.95 kg/m²      HENT:   Head: Normocephalic.   Neck: No JVD present. Carotid bruit is not present.   Cardiovascular: Regular rhythm and normal heart sounds.   No murmur heard.  Smooth VAD hum. DLES is "1"   Pulmonary/Chest: Effort normal and breath sounds normal. No respiratory distress. He has no wheezes. He has no rales.   Abdominal: Soft. Bowel sounds are normal. He exhibits no distension. There is no abdominal tenderness. There is no rebound.   Neurological: He is alert.   Skin: Skin is warm.   Vitals reviewed.      Lab Results   Component Value Date    WBC 9.19 10/15/2020    HGB 14.0 10/15/2020    HCT 46.0 10/15/2020    MCV 88 10/15/2020     10/15/2020    CO2 29 10/15/2020    CREATININE 0.9 10/15/2020    CALCIUM 9.7 10/15/2020    ALKALINEPHOS 144 05/04/2020    ALBUMIN 4.5 10/15/2020    AST 21 10/15/2020     (H) 10/15/2020    ALT 33 10/15/2020     10/15/2020       Lab Results   Component Value Date    INR 2.6 (H) 10/15/2020    INR 3.0 10/15/2020    INR 1.87 10/12/2020       BNP   Date Value Ref Range Status   10/15/2020 145 (H) 0 - 99 pg/mL Final     Comment:     Values of less than 100 pg/ml are consistent with non-CHF populations.   09/21/2020 132.8 pg/mL Final   09/02/2020 120 (H) 0 - 99 pg/mL Final     Comment:     Values of less than 100 pg/ml are consistent with non-CHF populations.       LD   Date Value Ref Range Status   10/15/2020 175 110 - 260 U/L Final     Comment:     Results are increased in hemolyzed samples.   09/02/2020 170 110 - 260 U/L Final     Comment:     Results are increased in hemolyzed samples.   07/29/2020 163 110 - 260 U/L Final     Comment:     Results " are increased in hemolyzed samples.       Assessment:      1. Chronic combined systolic and diastolic heart failure    2. Non-ischemic cardiomyopathy    3. AICD (automatic cardioverter/defibrillator) present    4. LVAD (left ventricular assist device) present        Plan:   Patient is now NYHA class II. BP is controlled.  INR is  subtherapeutic. Coumadin clinic to adjust his dose.   VAD interrogation was performed in clinic  Any VAD management kits dispensed today medically necessary  Recommend 2 gram sodium restriction and 1500cc fluid restriction.  Encourage physical activity with graded exercise program.  Requested patient to weigh themselves daily, and to notify us if their weight increases by more than 3 lbs in 1 day or 5 lbs in 1 week.   RTC in 1 month      Listed for transplant: No. Implanted as DT.     UNOS Patient Status  Functional Status: 80% - Normal activity with effort: some symptoms of disease  Physical Capacity: No Limitations  Working for Income: Unknown     Jeanette Tafoya MD

## 2020-10-15 NOTE — PROGRESS NOTES
INR at goal. Medications and chart reviewed. INR moving quickly - will advise serving of greens today. See calendar.

## 2020-10-15 NOTE — PROGRESS NOTES
Date of Implant with Heartmate 3 LVAD: 2020    PATIENT ARRIVED IN CLINIC:  Ambulatory   Accompanied by: self    Vitals  Temperature, oral:   Temp Readings from Last 1 Encounters:   10/15/20 98.2 °F (36.8 °C)     Blood Pressure:   BP Readings from Last 3 Encounters:   10/15/20 (!) 80/0   20 (!) 88/0   20 (!) 80/0        VAD Interrogation:  TXP CHRISTY INTERROGATIONS 10/15/2020 2020 2020   Type HeartMate3 HeartMate3 HeartMate3   Flow 4.4 4.2 4.2   Speed 5300 5300 5300   PI 4.2 4.9 4.2   Power (Centeno) 4.1 4.0 4.1   LSL 4900 4900 4900   Pulsatility No Pulse No Pulse No Pulse       History [id number]  Problems / Issues / Alarms with VAD if any: small tear in drive line, rescue tape applied. Encouraged pt to use anchors.  VAD Sounds: HM3 Smooth  Heartmate 3 Module Cable:  Attempted to unscrew modular cable to ensure it will be able to come lose in the event we ever need to change the modular cable while patient held the driveline in place so it would not move. Modular cable connection able to be unscrewed and re-tightened. Instructed pt to perform this weekly.    HCT:   Lab Results   Component Value Date    HCT 46.0 10/15/2020    HCT 30 (L) 2020       Complaints/reason for visit today: routine  Emergency Equipment With Patient: yes   VAD Binder With Patient: no   Reviewed VAD Numbers In Binder: no    Any Equipment Issues: small tear in drive line, rescue tape applied. Encouraged pt to use anchors.     DLES Assessment:  Appearance Of Driveline: 1 with scant amount of serous drainage. Pt reports intermittent bloody drainage; none seen today. Redness noted above DLES with hardness and tenderness. Reviewed with MD Michelet.  Antibiotics: NO  Velour: no  Manual & Visual Inspection Of Driveline: Tear, rescue tape applied  Stabilization Device In Use: no  - barton anchor applied today, encouraged pt to keep using barton anchors.    Patient MyChart Questionnaire: No flowsheet data found.      Assessment:   PAIN: YES pain ratin  Complaints Of Nausea / Vomiting: None noted    Appearance and Frequency Of Stools: normal and formed without blood & daily  Color Of Urine: clear/yellow  Coping/Depression/Anxiety: coping okay  Sleep Habits: 4 hrs /night  Sleep Aids: None noted  Showering: Yes, reminded to change dressing immediately after drying off  Activity/Exercise: 1hour/day walking   Driving: No.    DLES Dressing Care:   Frequency of Dressing Changes: daily & daily kit  Pt In Need Of Management Kits?:yes -   2 Box of daily kit  It is medically necessary to have VAD management kits in order to prevent infection or to assist in the healing of an infected DLES.    Labs:    Chemistry        Component Value Date/Time     10/15/2020 1310     2020    K 3.7 10/15/2020 1310    K 3.9 2020     10/15/2020 1310    CL 99 2020    CO2 29 10/15/2020 1310    CO2 22 2020    BUN 11 10/15/2020 1310    BUN 12 2020    CREATININE 0.9 10/15/2020 1310    CREATININE 1.0 2020    GLU 92 10/15/2020 1310        Component Value Date/Time    CALCIUM 9.7 10/15/2020 1310    CALCIUM 8.6 2020    ALKPHOS 154 (H) 10/15/2020 1310    AST 21 10/15/2020 1310    AST 19 2020    ALT 33 10/15/2020 1310    ALT 18 2020    ALT 16 2020    BILITOT 0.7 10/15/2020 1310    ESTGFRAFRICA >60.0 10/15/2020 1310    EGFRNONAA >60.0 10/15/2020 1310            Magnesium   Date Value Ref Range Status   10/15/2020 1.8 1.6 - 2.6 mg/dL Final       Lab Results   Component Value Date    WBC 9.19 10/15/2020    HGB 14.0 10/15/2020    HCT 46.0 10/15/2020    MCV 88 10/15/2020     10/15/2020       Lab Results   Component Value Date    INR 2.6 (H) 10/15/2020    INR 3.0 10/15/2020    INR 1.87 10/12/2020       BNP   Date Value Ref Range Status   10/15/2020 145 (H) 0 - 99 pg/mL Final     Comment:     Values of less than 100 pg/ml are consistent with non-CHF populations.   2020 132.8  pg/mL Final   09/02/2020 120 (H) 0 - 99 pg/mL Final     Comment:     Values of less than 100 pg/ml are consistent with non-CHF populations.       LD   Date Value Ref Range Status   10/15/2020 175 110 - 260 U/L Final     Comment:     Results are increased in hemolyzed samples.   09/02/2020 170 110 - 260 U/L Final     Comment:     Results are increased in hemolyzed samples.   07/29/2020 163 110 - 260 U/L Final     Comment:     Results are increased in hemolyzed samples.       Labs reviewed with patient: YES     Patient Satisfaction Survey completed per patient: No  (explained about signature and box to check)  Medication reconciliation: per MA.  Medication Detail updated today: no  Coumadin Managed by: [facility]    Education: Reviewed driveline care, emergency procedures, how to change the controller, alarms with patient, as well as discussed how to page the VAD coordinator in case of an emergency.   Coved - 19 education: Reminded patient/caregiver to check temperature daily and call us if it is > 99.0.  Reminded them  to stay 6 feet away from other people, wash hands frequently, don't touch your face and stay home except yo get labs, medications, and appts.      Plans/Needs: Pt seen in clinic for routine follow up. Pt reports not wearing anchors because they break his skin out, but also reports small amount of new bloody drainage. Redness noted above exit site with hardness noted under skin. DLES is a 1 with scant amount of serous drainage, no blood noted today. Reviewed with MD Michelet. Small tear noted on drive line, rescue tape applied. Pt will follow up in 1 month.    Hurricane Season: Yes, discussed with patient: With hurricane season approaching, we want to make sure you are fully prepared for any emergency.  Should the National Weather Service or your local authorities recommend a voluntary or mandatory evacuation of your area, The VAD team requires you to evacuate to a safe place.  Remember, when  it is a mandatory evacuation, traffic will become an issue for your limited battery power.  Therefore, we strongly urge you to evacuate early.    The VAD team advises you to have the following in place before hurricane season:  Have an evacuation plan in place including places to evacuate, names and phone numbers.  This information is required to be given to the VAD coordinator.   1. Have your VAD emergency contact numbers with you.   2. Make sure your prescriptions will not run out by the end of September.   3. Make sure you have enough medications, including pills, inhalers, patches, etc. to take, should you be gone for more than 2 weeks.   4. Make sure ALL of your batteries are fully charged.   5. Bring enough dressing change supplies to last for at least 2 weeks.   6. Bring your VAD binder with you.  Make sure your binder is updated and complete with alarms reference card, patient hand book, emergency contact numbers, daily log sheets, etc.  If you do not have family or friends as an evacuation destination, we recommend evacuating to a safe area.   Do NOT evacuate to [facility].    The VAD team wants to stress the importance of planning for your evacuation in the event of a hurricane.  If you have any LVAD questions or issues, please contact the LVAD coordinator.

## 2020-10-15 NOTE — PROCEDURES
TXP CHRISTY INTERROGATIONS 10/15/2020 9/2/2020 7/29/2020 5/28/2020 4/30/2020 3/19/2020 3/12/2020   Type HeartMate3 HeartMate3 HeartMate3 HeartMate3 HeartMate3 HeartMate3 HeartMate3   Flow 4.4 4.2 4.2 3.8 4.0 4.1 4.2   Speed 5300 5300 5300 5300 5300 5300 5300   PI 4.2 4.9 4.2 5.5 5.1 4.3 5.1   Power (Centeno) 4.1 4.0 4.1 4.1 4.1 4.0 4.0   LSL 4900 4900 4900 4900 4900 4900 4900   Pulsatility No Pulse No Pulse No Pulse Intermittent pulse Intermittent pulse No Pulse No Pulse   }

## 2020-10-15 NOTE — LETTER
October 15, 2020        Miko Catalan  [address] [facility] [address]  Phone: [phone]  Fax: [phone]             [facility]  [address]  [facility] [address]  Phone: [phone]   Patient: Saba Lott   MR Number: [id number]   YOB: 1965   Date of Visit: 10/15/2020       Dear Dr. Miko Catalan    Thank you for referring Saba Lott to me for evaluation. Attached you will find relevant portions of my assessment and plan of care.    If you have questions, please do not hesitate to call me. I look forward to following Saba Lott along with you.    Sincerely,    Jeanette Tafoya MD    Enclosure    If you would like to receive this communication electronically, please contact [email or web address] or [phone] to request [email or web address] Link access.    [email or web address] Link is a tool which provides read-only access to select patient information with whom you have a relationship. Its easy to use and provides real time access to review your patients record including encounter summaries, notes, results, and demographic information.    If you feel you have received this communication in error or would no longer like to receive these types of communications, please e-mail [email or web address]

## 2020-10-21 ENCOUNTER — ANTI-COAG VISIT (OUTPATIENT)
Dept: CARDIOLOGY | Facility: CLINIC | Age: 55
End: 2020-10-21
Payer: MEDICARE

## 2020-10-21 DIAGNOSIS — Z95.811 LVAD (LEFT VENTRICULAR ASSIST DEVICE) PRESENT: ICD-10-CM

## 2020-10-21 LAB — INR PPP: 3.3

## 2020-10-21 PROCEDURE — 93793 PR ANTICOAGULANT MGMT FOR PT TAKING WARFARIN: ICD-10-PCS | Mod: S$GLB,,,

## 2020-10-21 PROCEDURE — 93793 ANTICOAG MGMT PT WARFARIN: CPT | Mod: S$GLB,,,

## 2020-10-26 ENCOUNTER — ANTI-COAG VISIT (OUTPATIENT)
Dept: CARDIOLOGY | Facility: CLINIC | Age: 55
End: 2020-10-26
Payer: MEDICARE

## 2020-10-26 DIAGNOSIS — Z95.811 LVAD (LEFT VENTRICULAR ASSIST DEVICE) PRESENT: ICD-10-CM

## 2020-10-26 LAB
BNP: 99
EXT ALBUMIN: 3.8
EXT ALT: 35
EXT AST: 29
EXT BUN: 15
EXT CALCIUM: 9.2
EXT CHLORIDE: 101
EXT CREATININE: 0.8 MG/DL
EXT GLUCOSE: 120
EXT HEMATOCRIT: 41.1
EXT HEMOGLOBIN: 13.1
EXT MAGNESIUM: 1.8
EXT PLATELETS: 397
EXT POTASSIUM: 4.2
EXT PROTEIN TOTAL: 7.2
EXT SODIUM: 135 MMOL/L
EXT WBC: 11.3
INR PPP: 2.08

## 2020-10-26 PROCEDURE — 93793 ANTICOAG MGMT PT WARFARIN: CPT | Mod: S$GLB,,,

## 2020-10-26 PROCEDURE — 93793 PR ANTICOAGULANT MGMT FOR PT TAKING WARFARIN: ICD-10-PCS | Mod: S$GLB,,,

## 2020-10-27 ENCOUNTER — DOCUMENTATION ONLY (OUTPATIENT)
Dept: TRANSPLANT | Facility: CLINIC | Age: 55
End: 2020-10-27

## 2020-11-02 ENCOUNTER — ANTI-COAG VISIT (OUTPATIENT)
Dept: CARDIOLOGY | Facility: CLINIC | Age: 55
End: 2020-11-02
Payer: MEDICARE

## 2020-11-02 DIAGNOSIS — Z95.811 LVAD (LEFT VENTRICULAR ASSIST DEVICE) PRESENT: ICD-10-CM

## 2020-11-02 LAB — INR PPP: 3.9

## 2020-11-02 PROCEDURE — 93793 ANTICOAG MGMT PT WARFARIN: CPT | Mod: S$GLB,,,

## 2020-11-02 PROCEDURE — 93793 PR ANTICOAGULANT MGMT FOR PT TAKING WARFARIN: ICD-10-PCS | Mod: S$GLB,,,

## 2020-11-02 NOTE — PROGRESS NOTES
Patient was called and advised of coumadin instructions and redraw date, Patient repeated instructions back correctly, Order was faxed to [facility]

## 2020-11-02 NOTE — PROGRESS NOTES
Per Home Health faxed message with INR result patient taking Cipro 500 mg BID x 7 days with last dose taken today.  Chart routed for further questioning.

## 2020-11-04 ENCOUNTER — ANTI-COAG VISIT (OUTPATIENT)
Dept: CARDIOLOGY | Facility: CLINIC | Age: 55
End: 2020-11-04
Payer: MEDICARE

## 2020-11-04 DIAGNOSIS — Z95.811 LVAD (LEFT VENTRICULAR ASSIST DEVICE) PRESENT: ICD-10-CM

## 2020-11-04 LAB — INR PPP: 2.2

## 2020-11-04 PROCEDURE — 93793 PR ANTICOAGULANT MGMT FOR PT TAKING WARFARIN: ICD-10-PCS | Mod: S$GLB,,,

## 2020-11-04 PROCEDURE — 93793 ANTICOAG MGMT PT WARFARIN: CPT | Mod: S$GLB,,,

## 2020-11-09 ENCOUNTER — ANTI-COAG VISIT (OUTPATIENT)
Dept: CARDIOLOGY | Facility: CLINIC | Age: 55
End: 2020-11-09
Payer: MEDICARE

## 2020-11-09 DIAGNOSIS — Z95.811 LVAD (LEFT VENTRICULAR ASSIST DEVICE) PRESENT: ICD-10-CM

## 2020-11-09 LAB
BILIRUBIN TOTAL: 0.58
BNP: 115
EXT ALBUMIN: 3.7
EXT ALT: 35
EXT AST: 24
EXT BUN: 14
EXT CALCIUM: 9.3
EXT CHLORIDE: 103
EXT CREATININE: 0.7 MG/DL
EXT GLUCOSE: 141
EXT HEMATOCRIT: 38.8
EXT HEMOGLOBIN: 12.4
EXT MAGNESIUM: 1.8
EXT PLATELETS: 295
EXT POTASSIUM: 4.1
EXT PROTEIN TOTAL: 6.9
EXT SODIUM: 136 MMOL/L
EXT WBC: 7.8
INR PPP: 1.74

## 2020-11-09 PROCEDURE — 93793 ANTICOAG MGMT PT WARFARIN: CPT | Mod: S$GLB,,,

## 2020-11-09 PROCEDURE — 93793 PR ANTICOAGULANT MGMT FOR PT TAKING WARFARIN: ICD-10-PCS | Mod: S$GLB,,,

## 2020-11-10 ENCOUNTER — TELEPHONE (OUTPATIENT)
Dept: TRANSPLANT | Facility: CLINIC | Age: 55
End: 2020-11-10

## 2020-11-12 ENCOUNTER — ANTI-COAG VISIT (OUTPATIENT)
Dept: CARDIOLOGY | Facility: CLINIC | Age: 55
End: 2020-11-12
Payer: MEDICARE

## 2020-11-12 DIAGNOSIS — Z95.811 LVAD (LEFT VENTRICULAR ASSIST DEVICE) PRESENT: ICD-10-CM

## 2020-11-12 LAB — INR PPP: 2.4

## 2020-11-12 PROCEDURE — 93793 PR ANTICOAGULANT MGMT FOR PT TAKING WARFARIN: ICD-10-PCS | Mod: S$GLB,,,

## 2020-11-12 PROCEDURE — 93793 ANTICOAG MGMT PT WARFARIN: CPT | Mod: S$GLB,,,

## 2020-11-17 ENCOUNTER — TELEPHONE (OUTPATIENT)
Dept: TRANSPLANT | Facility: CLINIC | Age: 55
End: 2020-11-17

## 2020-11-17 ENCOUNTER — ANTI-COAG VISIT (OUTPATIENT)
Dept: CARDIOLOGY | Facility: CLINIC | Age: 55
End: 2020-11-17
Payer: MEDICARE

## 2020-11-17 DIAGNOSIS — Z95.811 LVAD (LEFT VENTRICULAR ASSIST DEVICE) PRESENT: ICD-10-CM

## 2020-11-17 LAB
EXT ALBUMIN: 3.8
EXT ALT: 17
EXT AST: 16
EXT BUN: 9
EXT CALCIUM: 8.8
EXT CHLORIDE: 99
EXT CREATININE: 0.7 MG/DL
EXT HEMATOCRIT: 38.5
EXT HEMOGLOBIN: 12
EXT MAGNESIUM: 1.9
EXT PLATELETS: 254
EXT POTASSIUM: 4.1
EXT PROTEIN TOTAL: 7.1
EXT SODIUM: 139 MMOL/L
EXT WBC: 9
INR PPP: 1.5

## 2020-11-17 PROCEDURE — 93793 PR ANTICOAGULANT MGMT FOR PT TAKING WARFARIN: ICD-10-PCS | Mod: S$GLB,,,

## 2020-11-17 PROCEDURE — 93793 ANTICOAG MGMT PT WARFARIN: CPT | Mod: S$GLB,,,

## 2020-11-17 NOTE — TELEPHONE ENCOUNTER
"External labs entered for Review  Labs completed on 11/16/20  Results routed to VAD Coordinator      Glucose( not performed)    BNP not performed " no suitable specimen received.   "

## 2020-11-19 ENCOUNTER — OFFICE VISIT (OUTPATIENT)
Dept: TRANSPLANT | Facility: CLINIC | Age: 55
End: 2020-11-19
Payer: MEDICARE

## 2020-11-19 ENCOUNTER — CLINICAL SUPPORT (OUTPATIENT)
Dept: TRANSPLANT | Facility: CLINIC | Age: 55
End: 2020-11-19
Payer: MEDICARE

## 2020-11-19 ENCOUNTER — ANTI-COAG VISIT (OUTPATIENT)
Dept: CARDIOLOGY | Facility: CLINIC | Age: 55
End: 2020-11-19
Payer: MEDICARE

## 2020-11-19 ENCOUNTER — LAB VISIT (OUTPATIENT)
Dept: LAB | Facility: HOSPITAL | Age: 55
End: 2020-11-19
Attending: INTERNAL MEDICINE
Payer: MEDICARE

## 2020-11-19 VITALS — BODY MASS INDEX: 32.8 KG/M2 | HEIGHT: 67 IN | WEIGHT: 209 LBS | TEMPERATURE: 99 F | SYSTOLIC BLOOD PRESSURE: 80 MMHG

## 2020-11-19 DIAGNOSIS — Z95.810 AICD (AUTOMATIC CARDIOVERTER/DEFIBRILLATOR) PRESENT: ICD-10-CM

## 2020-11-19 DIAGNOSIS — Z95.811 HEART REPLACED BY HEART ASSIST DEVICE: ICD-10-CM

## 2020-11-19 DIAGNOSIS — Z95.811 HEART REPLACED BY HEART ASSIST DEVICE: Primary | ICD-10-CM

## 2020-11-19 DIAGNOSIS — Z95.811 LVAD (LEFT VENTRICULAR ASSIST DEVICE) PRESENT: Primary | ICD-10-CM

## 2020-11-19 DIAGNOSIS — I50.42 CHRONIC COMBINED SYSTOLIC AND DIASTOLIC HEART FAILURE: ICD-10-CM

## 2020-11-19 DIAGNOSIS — I42.8 NON-ISCHEMIC CARDIOMYOPATHY: ICD-10-CM

## 2020-11-19 LAB
ALBUMIN SERPL BCP-MCNC: 3.7 G/DL (ref 3.5–5.2)
ALP SERPL-CCNC: 111 U/L (ref 55–135)
ALT SERPL W/O P-5'-P-CCNC: 14 U/L (ref 10–44)
ANION GAP SERPL CALC-SCNC: 9 MMOL/L (ref 8–16)
AST SERPL-CCNC: 12 U/L (ref 10–40)
BASOPHILS # BLD AUTO: 0.03 K/UL (ref 0–0.2)
BASOPHILS NFR BLD: 0.3 % (ref 0–1.9)
BILIRUB DIRECT SERPL-MCNC: 0.4 MG/DL (ref 0.1–0.3)
BILIRUB SERPL-MCNC: 0.7 MG/DL (ref 0.1–1)
BNP SERPL-MCNC: 101 PG/ML (ref 0–99)
BUN SERPL-MCNC: 12 MG/DL (ref 6–20)
CALCIUM SERPL-MCNC: 9.3 MG/DL (ref 8.7–10.5)
CHLORIDE SERPL-SCNC: 99 MMOL/L (ref 95–110)
CO2 SERPL-SCNC: 27 MMOL/L (ref 23–29)
CREAT SERPL-MCNC: 0.9 MG/DL (ref 0.5–1.4)
CRP SERPL-MCNC: 137.5 MG/L (ref 0–8.2)
DIFFERENTIAL METHOD: ABNORMAL
EOSINOPHIL # BLD AUTO: 0.1 K/UL (ref 0–0.5)
EOSINOPHIL NFR BLD: 0.5 % (ref 0–8)
ERYTHROCYTE [DISTWIDTH] IN BLOOD BY AUTOMATED COUNT: 14.2 % (ref 11.5–14.5)
EST. GFR  (AFRICAN AMERICAN): >60 ML/MIN/1.73 M^2
EST. GFR  (NON AFRICAN AMERICAN): >60 ML/MIN/1.73 M^2
GLUCOSE SERPL-MCNC: 101 MG/DL (ref 70–110)
GRAM STN SPEC: NORMAL
GRAM STN SPEC: NORMAL
HCT VFR BLD AUTO: 39.5 % (ref 40–54)
HGB BLD-MCNC: 12.7 G/DL (ref 14–18)
IMM GRANULOCYTES # BLD AUTO: 0.06 K/UL (ref 0–0.04)
IMM GRANULOCYTES NFR BLD AUTO: 0.5 % (ref 0–0.5)
INR PPP: 1.6 (ref 0.8–1.2)
LDH SERPL L TO P-CCNC: 144 U/L (ref 110–260)
LYMPHOCYTES # BLD AUTO: 1.4 K/UL (ref 1–4.8)
LYMPHOCYTES NFR BLD: 11.8 % (ref 18–48)
MAGNESIUM SERPL-MCNC: 1.8 MG/DL (ref 1.6–2.6)
MCH RBC QN AUTO: 26.7 PG (ref 27–31)
MCHC RBC AUTO-ENTMCNC: 32.2 G/DL (ref 32–36)
MCV RBC AUTO: 83 FL (ref 82–98)
MONOCYTES # BLD AUTO: 1 K/UL (ref 0.3–1)
MONOCYTES NFR BLD: 9 % (ref 4–15)
NEUTROPHILS # BLD AUTO: 8.9 K/UL (ref 1.8–7.7)
NEUTROPHILS NFR BLD: 77.9 % (ref 38–73)
NRBC BLD-RTO: 0 /100 WBC
PHOSPHATE SERPL-MCNC: 3.2 MG/DL (ref 2.7–4.5)
PLATELET # BLD AUTO: 322 K/UL (ref 150–350)
PMV BLD AUTO: 9.5 FL (ref 9.2–12.9)
POTASSIUM SERPL-SCNC: 3.7 MMOL/L (ref 3.5–5.1)
PREALB SERPL-MCNC: 11 MG/DL (ref 20–43)
PROT SERPL-MCNC: 8.5 G/DL (ref 6–8.4)
PROTHROMBIN TIME: 17.2 SEC (ref 9–12.5)
RBC # BLD AUTO: 4.76 M/UL (ref 4.6–6.2)
SODIUM SERPL-SCNC: 135 MMOL/L (ref 136–145)
WBC # BLD AUTO: 11.46 K/UL (ref 3.9–12.7)

## 2020-11-19 PROCEDURE — 3008F BODY MASS INDEX DOCD: CPT | Mod: CPTII,S$GLB,, | Performed by: INTERNAL MEDICINE

## 2020-11-19 PROCEDURE — 99214 PR OFFICE/OUTPT VISIT, EST, LEVL IV, 30-39 MIN: ICD-10-PCS | Mod: S$GLB,,, | Performed by: INTERNAL MEDICINE

## 2020-11-19 PROCEDURE — 84100 ASSAY OF PHOSPHORUS: CPT

## 2020-11-19 PROCEDURE — 84134 ASSAY OF PREALBUMIN: CPT

## 2020-11-19 PROCEDURE — 99999 PR PBB SHADOW E&M-EST. PATIENT-LVL III: CPT | Mod: PBBFAC,,, | Performed by: INTERNAL MEDICINE

## 2020-11-19 PROCEDURE — 93750 OP LVAD INTERROGATION: ICD-10-PCS | Mod: S$GLB,,, | Performed by: INTERNAL MEDICINE

## 2020-11-19 PROCEDURE — 87186 SC STD MICRODIL/AGAR DIL: CPT

## 2020-11-19 PROCEDURE — 36415 COLL VENOUS BLD VENIPUNCTURE: CPT

## 2020-11-19 PROCEDURE — 87077 CULTURE AEROBIC IDENTIFY: CPT

## 2020-11-19 PROCEDURE — 85610 PROTHROMBIN TIME: CPT

## 2020-11-19 PROCEDURE — 82248 BILIRUBIN DIRECT: CPT

## 2020-11-19 PROCEDURE — 87205 SMEAR GRAM STAIN: CPT

## 2020-11-19 PROCEDURE — 3078F DIAST BP <80 MM HG: CPT | Mod: CPTII,S$GLB,, | Performed by: INTERNAL MEDICINE

## 2020-11-19 PROCEDURE — 99214 OFFICE O/P EST MOD 30 MIN: CPT | Mod: S$GLB,,, | Performed by: INTERNAL MEDICINE

## 2020-11-19 PROCEDURE — 80053 COMPREHEN METABOLIC PANEL: CPT

## 2020-11-19 PROCEDURE — 3074F PR MOST RECENT SYSTOLIC BLOOD PRESSURE < 130 MM HG: ICD-10-PCS | Mod: CPTII,S$GLB,, | Performed by: INTERNAL MEDICINE

## 2020-11-19 PROCEDURE — 99999 PR PBB SHADOW E&M-EST. PATIENT-LVL III: ICD-10-PCS | Mod: PBBFAC,,, | Performed by: INTERNAL MEDICINE

## 2020-11-19 PROCEDURE — 83735 ASSAY OF MAGNESIUM: CPT

## 2020-11-19 PROCEDURE — 85025 COMPLETE CBC W/AUTO DIFF WBC: CPT

## 2020-11-19 PROCEDURE — 87075 CULTR BACTERIA EXCEPT BLOOD: CPT

## 2020-11-19 PROCEDURE — 93750 INTERROGATION VAD IN PERSON: CPT | Mod: S$GLB,,, | Performed by: INTERNAL MEDICINE

## 2020-11-19 PROCEDURE — 3078F PR MOST RECENT DIASTOLIC BLOOD PRESSURE < 80 MM HG: ICD-10-PCS | Mod: CPTII,S$GLB,, | Performed by: INTERNAL MEDICINE

## 2020-11-19 PROCEDURE — 1126F PR PAIN SEVERITY QUANTIFIED, NO PAIN PRESENT: ICD-10-PCS | Mod: S$GLB,,, | Performed by: INTERNAL MEDICINE

## 2020-11-19 PROCEDURE — 86140 C-REACTIVE PROTEIN: CPT

## 2020-11-19 PROCEDURE — 87070 CULTURE OTHR SPECIMN AEROBIC: CPT

## 2020-11-19 PROCEDURE — 83880 ASSAY OF NATRIURETIC PEPTIDE: CPT

## 2020-11-19 PROCEDURE — 83615 LACTATE (LD) (LDH) ENZYME: CPT

## 2020-11-19 PROCEDURE — 3074F SYST BP LT 130 MM HG: CPT | Mod: CPTII,S$GLB,, | Performed by: INTERNAL MEDICINE

## 2020-11-19 PROCEDURE — 3008F PR BODY MASS INDEX (BMI) DOCUMENTED: ICD-10-PCS | Mod: CPTII,S$GLB,, | Performed by: INTERNAL MEDICINE

## 2020-11-19 PROCEDURE — 1126F AMNT PAIN NOTED NONE PRSNT: CPT | Mod: S$GLB,,, | Performed by: INTERNAL MEDICINE

## 2020-11-19 RX ORDER — DOXYCYCLINE 100 MG/1
100 CAPSULE ORAL 2 TIMES DAILY
Qty: 14 CAPSULE | Refills: 0 | Status: SHIPPED | OUTPATIENT
Start: 2020-11-19 | End: 2020-12-17 | Stop reason: SDUPTHER

## 2020-11-19 RX ORDER — CIPROFLOXACIN 500 MG/1
500 TABLET ORAL EVERY 12 HOURS
Qty: 14 TABLET | Refills: 0 | Status: SHIPPED | OUTPATIENT
Start: 2020-11-19 | End: 2021-01-25 | Stop reason: ALTCHOICE

## 2020-11-19 NOTE — PROGRESS NOTES
"Date of Implant with Heartmate 3 LVAD: 20    PATIENT ARRIVED IN CLINIC:  Ambulatory   Accompanied by: self    Vitals  Temperature, oral:   Temp Readings from Last 1 Encounters:   20 99.1 °F (37.3 °C) (Oral)     Blood Pressure:   BP Readings from Last 3 Encounters:   20 (!) 80/0   10/15/20 (!) 80/0   20 (!) 88/0        VAD Interrogation:  TXP CHRISTY INTERROGATIONS 2020 10/15/2020 2020   Type HeartMate3 HeartMate3 HeartMate3   Flow 4.4 4.4 4.2   Speed 5300 5300 5300   PI 3.4 4.2 4.9   Power (Centeno) 4.0 4.1 4.0   LSL 4900 4900 4900   Pulsatility Intermittent pulse No Pulse No Pulse       History [id number]  Problems / Issues / Alarms with VAD if any: None noted  VAD Sounds: HM3 Smooth  Heartmate 3 Module Cable:  No yellow exposed and Attempted to unscrew modular cable to ensure it will be able to come lose in the event we ever need to change the modular cable while patient held the driveline in place so it would not move. Modular cable connection able to be unscrewed and re-tightened. Instructed pt to perform this weekly.    HCT:   Lab Results   Component Value Date    HCT 39.5 (L) 2020    HCT 30 (L) 2020       Complaints/reason for visit today: routine  Emergency Equipment With Patient: yes   VAD Binder With Patient: no   Reviewed VAD Numbers In Binder: no  Enrolled in Care Companion: no    Any Equipment Issues: None noted (Refer to  note for complete details)    DLES Assessment:  Appearance Of Driveline: "1" with purulent bloody drainage  Antibiotics: NO started today on doxycycline and cipro  Velour: no  Manual & Visual Inspection Of Driveline: Old rescue tape noted  Stabilization Device In Use: yes, barton securement device          Patient MyChart Questionnaire: No flowsheet data found.     Assessment:   PAIN: YES Pain Ratin, Location of Pain: DLES, Description of Pain:  , Pain Medication/How Often: no, Does this relieve the Pain? " n/a  Complaints Of Nausea / Vomiting: None noted    Appearance and Frequency Of Stools: normal and formed without blood & daily  Color Of Urine: clear/yellow  Coping/Depression/Anxiety: coping okay  Sleep Habits: 3-4 hrs /night  Sleep Aids: None noted  Showering: Yes, reminded to change dressing immediately after drying off, instructed to stop showering for now. Also reeducated on showering with dressing on, covered with plastic wrap, and changed immediately after showering. Patient reports he was showering with dressing off.  Activity/Exercise: none   Driving: Yes. Reminded to pull over should there be an alarm before looking down at controller.    DLES Dressing Care:   Frequency of Dressing Changes: daily & daily kit  Pt In Need Of Management Kits?:yes -   2 Box of daily kit  It is medically necessary to have VAD management kits in order to prevent infection or to assist in the healing of an infected DLES.    Labs:    Chemistry        Component Value Date/Time     (L) 11/19/2020 1408     07/21/2020    K 3.7 11/19/2020 1408    K 3.9 07/21/2020    CL 99 11/19/2020 1408    CL 99 07/21/2020    CO2 27 11/19/2020 1408    CO2 22 07/21/2020    BUN 12 11/19/2020 1408    BUN 12 07/21/2020    CREATININE 0.9 11/19/2020 1408    CREATININE 1.0 07/21/2020     11/19/2020 1408        Component Value Date/Time    CALCIUM 9.3 11/19/2020 1408    CALCIUM 8.6 07/21/2020    ALKPHOS 111 11/19/2020 1408    AST 12 11/19/2020 1408    AST 19 04/27/2020    ALT 14 11/19/2020 1408    ALT 18 05/04/2020    ALT 16 04/27/2020    BILITOT 0.7 11/19/2020 1408    ESTGFRAFRICA >60.0 11/19/2020 1408    EGFRNONAA >60.0 11/19/2020 1408            Magnesium   Date Value Ref Range Status   11/19/2020 1.8 1.6 - 2.6 mg/dL Final       Lab Results   Component Value Date    WBC 11.46 11/19/2020    HGB 12.7 (L) 11/19/2020    HCT 39.5 (L) 11/19/2020    MCV 83 11/19/2020     11/19/2020       Lab Results   Component Value Date    INR 1.6 (H)  11/19/2020    INR 1.5 11/16/2020    INR 2.4 11/12/2020       BNP   Date Value Ref Range Status   11/19/2020 101 (H) 0 - 99 pg/mL Final     Comment:     Values of less than 100 pg/ml are consistent with non-CHF populations.   10/15/2020 145 (H) 0 - 99 pg/mL Final     Comment:     Values of less than 100 pg/ml are consistent with non-CHF populations.   09/21/2020 132.8 pg/mL Final       LD   Date Value Ref Range Status   11/19/2020 144 110 - 260 U/L Final     Comment:     Results are increased in hemolyzed samples.   10/15/2020 175 110 - 260 U/L Final     Comment:     Results are increased in hemolyzed samples.   09/02/2020 170 110 - 260 U/L Final     Comment:     Results are increased in hemolyzed samples.       Labs reviewed with patient: YES      Patient Satisfaction Survey completed per patient: No  (explained about signature and box to check)  Medication reconciliation: per MA.  Medication Detail updated today: no  Coumadin Managed by: [facility]    Education: Reviewed driveline care, emergency procedures, how to change the controller, alarms with patient, as well as discussed how to page the VAD coordinator in case of an emergency.   Covid - 19 education: Reminded patient/caregiver to check temperature daily and call us if it is > 99.0.  Reminded them  to stay 6 feet away from other people, wash hands frequently, don't touch your face and stay home except to get labs, medications, and appts.      Plans/Needs: Patient in for routine follow up. Patient with c/o pain and bleeding at DLES. Doxy and cipro ordered, site cultured. Patient instructed to call in a week and let us know how site/pain is doing. Will message coumadin clinic about start of antibiotics. Patient to RTC in 1 month, refer to MD note.     Hurricane Season: Yes, discussed with patient: With hurricane season approaching, we want to make sure you are fully prepared for any emergency.  Should the National Weather Service or your local  authorities recommend a voluntary or mandatory evacuation of your area, The VAD team requires you to evacuate to a safe place.  Remember, when it is a mandatory evacuation, traffic will become an issue for your limited battery power.  Therefore, we strongly urge you to evacuate early.    The VAD team advises you to have the following in place before hurricane season:  Have an evacuation plan in place including places to evacuate, names and phone numbers.  This information is required to be given to the VAD coordinator.   1. Have your VAD emergency contact numbers with you.   2. Make sure your prescriptions will not run out by the end of September.   3. Make sure you have enough medications, including pills, inhalers, patches,   etc. to take, should you be gone for more than 2 weeks.   4. Make sure ALL of your batteries are fully charged.   5. Bring enough dressing change supplies to last for at least 2 weeks.   6. Bring your VAD binder with you.  Make sure your binder is updated and complete with alarms reference card, patient hand book, emergency contact numbers, daily log sheets, etc.  If you do not have family or friends as an evacuation destination, we recommend evacuating to a safe area.   Do NOT evacuate to [facility].    The VAD team wants to stress the importance of planning for your evacuation in the event of a hurricane.  If you have any LVAD questions or issues, please contact the LVAD coordinator.

## 2020-11-19 NOTE — PROCEDURES
TXP CHRISTY INTERROGATIONS 11/19/2020 10/15/2020 9/2/2020 7/29/2020 5/28/2020 4/30/2020 3/19/2020   Type HeartMate3 HeartMate3 HeartMate3 HeartMate3 HeartMate3 HeartMate3 HeartMate3   Flow 4.4 4.4 4.2 4.2 3.8 4.0 4.1   Speed 5300 5300 5300 5300 5300 5300 5300   PI 3.4 4.2 4.9 4.2 5.5 5.1 4.3   Power (Centeno) 4.0 4.1 4.0 4.1 4.1 4.1 4.0   LSL 4900 4900 4900 4900 4900 4900 4900   Pulsatility Intermittent pulse No Pulse No Pulse No Pulse Intermittent pulse Intermittent pulse No Pulse   }

## 2020-11-19 NOTE — PROGRESS NOTES
"Subjective:   Patient ID:  Saba Lott is a 55 y.o. male who presents for LVAD followup visit.        Implant Date: 2/6/2020  Initials:KAC     Heartmate 3 RPM 5300     INR goal: 2-3   Bridge with Heparin   Antiplatelets:      TXP CHRISTY INTERROGATIONS 11/19/2020   Type HeartMate3   Flow 4.4   Speed 5300   PI 3.4   Power (Centeno) 4.0   LSL 4900   Pulsatility Intermittent pulse       HPI  Mr. Lott is a very pleasant 53 year old AAM with DCM, HTN, tobacco and ETOH abuse (quit 2018), who presented to the hospital in January with ADHF/cardiogenic shock, was satrted on CRRT, had IABP placed, recovered from this from renal standpoint and labs, and proceeded with LVAD impalnt 02/06. Post op course was complicated by some RV failure requiring dobutamine, high dose diuretics, but was eventually weaned off successfully, with PICC and  weaned off as well. Patient had some post op thrombocytosis however heme onc consulted and felt it was reactive. Now has resolved and he has done well since. Comes for his regular clinic visit. Doing well. Has no complaints.  VAD speed is at 5300 rpm. No VAD alarms noted on interrogation occasional PI events. BP is 80. DLES is a "1" with drainage. INR is subtherapeutic at 1.6. LDH is 175 at baseline.     Review of Systems   Constitution: Negative. Negative for chills, decreased appetite, diaphoresis, fever, malaise/fatigue, night sweats, weight gain and weight loss.   Eyes: Negative.    Cardiovascular: Negative for chest pain, claudication, cyanosis, dyspnea on exertion, irregular heartbeat, leg swelling, near-syncope, orthopnea, palpitations, paroxysmal nocturnal dyspnea and syncope.   Respiratory: Negative for cough, hemoptysis and shortness of breath.    Endocrine: Negative.    Hematologic/Lymphatic: Negative.    Skin: Negative for color change, dry skin and nail changes.   Musculoskeletal: Negative.    Gastrointestinal: Negative.    Genitourinary: Negative.    Neurological: Negative " "for weakness.       Objective:   Blood pressure (!) 80/0, temperature 99.1 °F (37.3 °C), temperature source Oral, height 5' 7" (1.702 m), weight 94.8 kg (209 lb).body mass index is 32.73 kg/m².    Doppler: 80    Physical Exam   Constitutional: He appears well-developed.   BP (!) 80/0 (BP Location: Left arm, Patient Position: Sitting) Comment (BP Method): doppler  Temp 99.1 °F (37.3 °C) (Oral)   Ht 5' 7" (1.702 m)   Wt 94.8 kg (209 lb)   BMI 32.73 kg/m²      HENT:   Head: Normocephalic.   Neck: No JVD present. Carotid bruit is not present.   Cardiovascular: Regular rhythm and normal heart sounds.   No murmur heard.  Smooth VAD hum. DLES is "1" with drainage   Pulmonary/Chest: Effort normal and breath sounds normal. No respiratory distress. He has no wheezes. He has no rales.   Abdominal: Soft. Bowel sounds are normal. He exhibits no distension. There is no abdominal tenderness. There is no rebound.   Musculoskeletal:         General: No edema.   Neurological: He is alert.   Skin: Skin is warm.   Vitals reviewed.          Lab Results   Component Value Date    WBC 11.46 11/19/2020    HGB 12.7 (L) 11/19/2020    HCT 39.5 (L) 11/19/2020    MCV 83 11/19/2020     11/19/2020    CO2 29 10/15/2020    CREATININE 0.9 10/15/2020    CALCIUM 9.7 10/15/2020    ALKALINEPHOS 144 05/04/2020    ALBUMIN 4.5 10/15/2020    AST 21 10/15/2020     (H) 10/15/2020    ALT 33 10/15/2020     10/15/2020       Lab Results   Component Value Date    INR 1.6 (H) 11/19/2020    INR 1.5 11/16/2020    INR 2.4 11/12/2020       BNP   Date Value Ref Range Status   10/15/2020 145 (H) 0 - 99 pg/mL Final     Comment:     Values of less than 100 pg/ml are consistent with non-CHF populations.   09/21/2020 132.8 pg/mL Final   09/02/2020 120 (H) 0 - 99 pg/mL Final     Comment:     Values of less than 100 pg/ml are consistent with non-CHF populations.       LD   Date Value Ref Range Status   10/15/2020 175 110 - 260 U/L Final     Comment:     " Results are increased in hemolyzed samples.   09/02/2020 170 110 - 260 U/L Final     Comment:     Results are increased in hemolyzed samples.   07/29/2020 163 110 - 260 U/L Final     Comment:     Results are increased in hemolyzed samples.           Assessment:      1. Heart replaced by heart assist device    2. Chronic combined systolic and diastolic heart failure    3. Non-ischemic cardiomyopathy    4. AICD (automatic cardioverter/defibrillator) present        Plan:   Patient is now NYHA class II. BP is controlled.  INR is  subtherapeutic. Coumadin clinic to adjust his dose.   VAD interrogation was performed in clinic  DL infection. Will start Cipro and Doxy for 7 days. Cultures were obtained. Will follow. Patient instructed to call us if DL does not get better or if he has pain.   Any VAD management kits dispensed today medically necessary  Recommend 2 gram sodium restriction and 1500cc fluid restriction.  Encourage physical activity with graded exercise program.  Requested patient to weigh themselves daily, and to notify us if their weight increases by more than 3 lbs in 1 day or 5 lbs in 1 week.   RTC in 1 month      Listed for transplant: No. Implanted as DT.     UNOS Patient Status  Functional Status: 80% - Normal activity with effort: some symptoms of disease  Physical Capacity: No Limitations  Working for Income: Unknown     Jeanette Tafoya MD

## 2020-11-19 NOTE — LETTER
November 19, 2020        Miko Catalan  [address] [facility] [address]  Phone: [phone]  Fax: [phone]             [facility]  [address]  [facility] [address]  Phone: [phone]   Patient: Saba Lott   MR Number: [id number]   YOB: 1965   Date of Visit: 11/19/2020       Dear Dr. Miko Catalan    Thank you for referring Saba Lott to me for evaluation. Attached you will find relevant portions of my assessment and plan of care.    If you have questions, please do not hesitate to call me. I look forward to following Saba Lott along with you.    Sincerely,    Jeanette Tafoya MD    Enclosure    If you would like to receive this communication electronically, please contact [email or web address] or [phone] to request [employer] Link access.    [employer] Link is a tool which provides read-only access to select patient information with whom you have a relationship. Its easy to use and provides real time access to review your patients record including encounter summaries, notes, results, and demographic information.    If you feel you have received this communication in error or would no longer like to receive these types of communications, please e-mail [email or web address]

## 2020-11-20 RX ORDER — ENOXAPARIN SODIUM 100 MG/ML
50 INJECTION SUBCUTANEOUS 2 TIMES DAILY
Qty: 10 SYRINGE | Refills: 1 | Status: SHIPPED | OUTPATIENT
Start: 2020-11-20 | End: 2021-02-09 | Stop reason: SDUPTHER

## 2020-11-20 NOTE — PROGRESS NOTES
INR not at goal. Medications, chart, and patient findings reviewed. See calendar for adjustments to dose and follow up plan. Plan to start lovenox 50mg BID through 11/22 PM dose. Lovenox education provided by DANIEL Payton.

## 2020-11-21 LAB — BACTERIA SPEC AEROBE CULT: ABNORMAL

## 2020-11-23 ENCOUNTER — ANTI-COAG VISIT (OUTPATIENT)
Dept: CARDIOLOGY | Facility: CLINIC | Age: 55
End: 2020-11-23
Payer: MEDICARE

## 2020-11-23 ENCOUNTER — DOCUMENTATION ONLY (OUTPATIENT)
Dept: TRANSPLANT | Facility: CLINIC | Age: 55
End: 2020-11-23

## 2020-11-23 DIAGNOSIS — Z95.811 LVAD (LEFT VENTRICULAR ASSIST DEVICE) PRESENT: ICD-10-CM

## 2020-11-23 LAB
BNP: 124
EXT ALBUMIN: 3.7
EXT ALT: 17
EXT AST: 26
EXT BUN: 11
EXT CALCIUM: 9.3
EXT CHLORIDE: 99
EXT CREATININE: 0.7 MG/DL
EXT GLUCOSE: 115
EXT HEMATOCRIT: 37.8
EXT HEMOGLOBIN: 11.9
EXT MAGNESIUM: 1.7
EXT PLATELETS: 442
EXT POTASSIUM: 3.4
EXT PROTEIN TOTAL: 7.4
EXT SODIUM: 137 MMOL/L
EXT WBC: 9.1
INR PPP: 2.19
INR PPP: 2.19
Lab: 4.1

## 2020-11-23 PROCEDURE — 93793 PR ANTICOAGULANT MGMT FOR PT TAKING WARFARIN: ICD-10-PCS | Mod: S$GLB,,,

## 2020-11-23 PROCEDURE — 93793 ANTICOAG MGMT PT WARFARIN: CPT | Mod: S$GLB,,,

## 2020-11-23 NOTE — PROGRESS NOTES
Pt reports that he did not start lovenox as directed last week because the pharmacy did not have it. He did not notify coumadin clinic. Confirmed he took the correct dose of coumadin.

## 2020-11-23 NOTE — PROGRESS NOTES
INR at goal. Medications and chart reviewed. No changes noted to necessitate adjustment of warfarin or follow-up plan. See calendar. Will ensure he stopped lovenox as instructed.

## 2020-11-24 LAB — BACTERIA SPEC ANAEROBE CULT: NORMAL

## 2020-11-27 NOTE — PROGRESS NOTES
11/25 INR not rec'd and HH agency closed today - pt states no home visit 11/25 so rescheduled fro 11/30 and order faxed to

## 2020-11-28 ENCOUNTER — NURSE TRIAGE (OUTPATIENT)
Dept: ADMINISTRATIVE | Facility: CLINIC | Age: 55
End: 2020-11-28

## 2020-11-28 DIAGNOSIS — I50.42 CHRONIC COMBINED SYSTOLIC AND DIASTOLIC CONGESTIVE HEART FAILURE: ICD-10-CM

## 2020-11-28 RX ORDER — ASPIRIN 325 MG
325 TABLET ORAL DAILY
Qty: 30 TABLET | Refills: 0 | Status: SHIPPED | OUTPATIENT
Start: 2020-11-28 | End: 2020-11-30 | Stop reason: SDUPTHER

## 2020-11-28 RX ORDER — ATORVASTATIN CALCIUM 20 MG/1
20 TABLET, FILM COATED ORAL DAILY
Qty: 30 TABLET | Refills: 0 | Status: SHIPPED | OUTPATIENT
Start: 2020-11-28 | End: 2020-11-30 | Stop reason: SDUPTHER

## 2020-11-28 RX ORDER — AMLODIPINE BESYLATE 10 MG/1
10 TABLET ORAL DAILY
Qty: 30 TABLET | Refills: 0 | Status: SHIPPED | OUTPATIENT
Start: 2020-11-28 | End: 2020-11-30 | Stop reason: SDUPTHER

## 2020-11-28 RX ORDER — SPIRONOLACTONE 25 MG/1
25 TABLET ORAL DAILY
Qty: 30 TABLET | Refills: 0 | Status: SHIPPED | OUTPATIENT
Start: 2020-11-28 | End: 2020-11-30 | Stop reason: SDUPTHER

## 2020-11-28 RX ORDER — DOCUSATE SODIUM 100 MG/1
CAPSULE, LIQUID FILLED ORAL
Qty: 60 CAPSULE | Refills: 0 | Status: SHIPPED | OUTPATIENT
Start: 2020-11-28 | End: 2021-01-01

## 2020-11-28 NOTE — TELEPHONE ENCOUNTER
"  Reason for Disposition   [1] Caller has URGENT medication question about med that PCP or specialist prescribed AND [2] triager unable to answer question    Additional Information   Negative: Drug overdose and triager unable to answer question   Negative: Caller requesting information unrelated to medicine   Negative: Caller requesting a prescription for Strep throat and has a positive culture result   Negative: Rash while taking a medication or within 3 days of stopping it   Negative: Immunization reaction suspected   Negative: [1] Asthma and [2] having symptoms of asthma (cough, wheezing, etc.)   Negative: [1] Influenza symptoms AND [2] anti-viral med prescription request, such as Tamiflu   Negative: [1] Symptom of illness (e.g., headache, abdominal pain, earache, vomiting) AND [2] more than mild   Negative: MORE THAN A DOUBLE DOSE of a prescription or over-the-counter (OTC) drug   Negative: [1] DOUBLE DOSE (an extra dose or lesser amount) of over-the-counter (OTC) drug AND [2] any symptoms (e.g., dizziness, nausea, pain, sleepiness)   Negative: [1] DOUBLE DOSE (an extra dose or lesser amount) of prescription drug AND [2] any symptoms (e.g., dizziness, nausea, pain, sleepiness)   Negative: Took another person's prescription drug   Negative: [1] Pharmacy calling with prescription questions AND [2] triager unable to answer question   Negative: [1] Prescription not at pharmacy AND [2] was prescribed by PCP recently   Negative: [1] Request for URGENT new prescription or refill of "essential" medication (i.e., likelihood of harm to patient if not taken) AND [2] triager unable to fill per unit policy   Negative: Diabetes drug error or overdose (e.g., took wrong type of insulin or took extra dose)   Negative: [1] DOUBLE DOSE (an extra dose or lesser amount) of prescription drug AND [2] NO symptoms (Exception: a double dose of antibiotics)    Protocols used: MEDICATION QUESTION CALL-A-  LVAD  BPA " n/a  CC: pt called re needs refill. [id number] mg caps. pt states he last took med last pm.   spironolactone 25 mg - last dose last pm  asa 325 mg   atorvastatin 20 mg  amlodipine 10 mg   Spoke with dr robb marshall for 30 day supply of each  krysta Smith. LM on pharm VM at 1211pm

## 2020-11-30 DIAGNOSIS — I50.42 CHRONIC COMBINED SYSTOLIC AND DIASTOLIC CONGESTIVE HEART FAILURE: ICD-10-CM

## 2020-11-30 DIAGNOSIS — Z95.811 LVAD (LEFT VENTRICULAR ASSIST DEVICE) PRESENT: ICD-10-CM

## 2020-11-30 RX ORDER — SPIRONOLACTONE 25 MG/1
25 TABLET ORAL DAILY
Qty: 30 TABLET | Refills: 11 | Status: SHIPPED | OUTPATIENT
Start: 2020-11-30 | End: 2021-01-14 | Stop reason: SDUPTHER

## 2020-11-30 RX ORDER — WARFARIN 7.5 MG/1
7.5 TABLET ORAL DAILY
Qty: 30 TABLET | Refills: 11 | Status: ON HOLD | OUTPATIENT
Start: 2020-11-30 | End: 2021-01-01 | Stop reason: SDUPTHER

## 2020-11-30 RX ORDER — BUMETANIDE 1 MG/1
1 TABLET ORAL 2 TIMES DAILY
Qty: 180 TABLET | Refills: 3 | Status: ON HOLD | OUTPATIENT
Start: 2020-11-30 | End: 2021-01-01 | Stop reason: SDUPTHER

## 2020-11-30 RX ORDER — POTASSIUM CHLORIDE 20 MEQ/1
20 TABLET, EXTENDED RELEASE ORAL DAILY
Qty: 60 TABLET | Refills: 3 | Status: SHIPPED | OUTPATIENT
Start: 2020-11-30 | End: 2021-01-01 | Stop reason: SDUPTHER

## 2020-11-30 RX ORDER — HYDRALAZINE HYDROCHLORIDE 100 MG/1
100 TABLET, FILM COATED ORAL EVERY 8 HOURS
Qty: 270 TABLET | Refills: 3 | Status: ON HOLD | OUTPATIENT
Start: 2020-11-30 | End: 2022-01-01 | Stop reason: HOSPADM

## 2020-11-30 RX ORDER — FUROSEMIDE 20 MG/1
60 TABLET ORAL ONCE AS NEEDED
Qty: 30 TABLET | Refills: 3 | Status: SHIPPED | OUTPATIENT
Start: 2020-11-30 | End: 2021-01-14 | Stop reason: SDUPTHER

## 2020-11-30 RX ORDER — ATORVASTATIN CALCIUM 20 MG/1
20 TABLET, FILM COATED ORAL DAILY
Qty: 30 TABLET | Refills: 11 | Status: SHIPPED | OUTPATIENT
Start: 2020-11-30 | End: 2021-01-14 | Stop reason: SDUPTHER

## 2020-11-30 RX ORDER — AMLODIPINE BESYLATE 10 MG/1
10 TABLET ORAL DAILY
Qty: 30 TABLET | Refills: 11 | Status: SHIPPED | OUTPATIENT
Start: 2020-11-30 | End: 2021-01-14 | Stop reason: SDUPTHER

## 2020-11-30 RX ORDER — LISINOPRIL 5 MG/1
5 TABLET ORAL DAILY
Qty: 90 TABLET | Refills: 3 | Status: SHIPPED | OUTPATIENT
Start: 2020-11-30 | End: 2021-01-01

## 2020-11-30 RX ORDER — ASPIRIN 325 MG
325 TABLET ORAL DAILY
Qty: 30 TABLET | Refills: 11 | Status: SHIPPED | OUTPATIENT
Start: 2020-11-30 | End: 2021-01-14 | Stop reason: SDUPTHER

## 2020-11-30 NOTE — PROGRESS NOTES
Stephenie with [facility] called to report that the office was not closed 11/25, reports the office did not receive order to draw INR 11/25 and they were closed on 11/26 & 11/27 but not 11/25, Lab will be drawn today 11/30

## 2020-12-01 ENCOUNTER — ANTI-COAG VISIT (OUTPATIENT)
Dept: CARDIOLOGY | Facility: CLINIC | Age: 55
End: 2020-12-01

## 2020-12-01 ENCOUNTER — DOCUMENTATION ONLY (OUTPATIENT)
Dept: TRANSPLANT | Facility: CLINIC | Age: 55
End: 2020-12-01

## 2020-12-01 DIAGNOSIS — Z95.811 LVAD (LEFT VENTRICULAR ASSIST DEVICE) PRESENT: ICD-10-CM

## 2020-12-01 LAB
ALT SERPL W P-5'-P-CCNC: 35 U/L
AST SERPL W P-5'-P-CCNC: 21 U/L
BUN BLD-MCNC: 16 MG/DL (ref 4–21)
CALCIUM SERPL-MCNC: 9.6 MG/DL
CREAT SERPL-MCNC: 0.8 MG/DL
INR PPP: 1.6
MAGNESIUM SERPL-MCNC: 2 MG/DL
POTASSIUM: 4.5 MMOL/L
SODIUM: 136 MMOL/L

## 2020-12-03 ENCOUNTER — ANTI-COAG VISIT (OUTPATIENT)
Dept: CARDIOLOGY | Facility: CLINIC | Age: 55
End: 2020-12-03
Payer: MEDICARE

## 2020-12-03 DIAGNOSIS — Z95.811 LVAD (LEFT VENTRICULAR ASSIST DEVICE) PRESENT: ICD-10-CM

## 2020-12-03 LAB — INR PPP: 2.6

## 2020-12-03 PROCEDURE — 93793 ANTICOAG MGMT PT WARFARIN: CPT | Mod: S$GLB,,,

## 2020-12-03 PROCEDURE — 93793 PR ANTICOAGULANT MGMT FOR PT TAKING WARFARIN: ICD-10-PCS | Mod: S$GLB,,,

## 2020-12-03 NOTE — PROGRESS NOTES
Pt reports that he has eating turnips ( just the bulb) today. Advised pt to discontinue due to vitamin K

## 2020-12-07 ENCOUNTER — ANTI-COAG VISIT (OUTPATIENT)
Dept: CARDIOLOGY | Facility: CLINIC | Age: 55
End: 2020-12-07
Payer: MEDICARE

## 2020-12-07 DIAGNOSIS — Z95.811 LVAD (LEFT VENTRICULAR ASSIST DEVICE) PRESENT: ICD-10-CM

## 2020-12-07 LAB — INR PPP: 2

## 2020-12-07 PROCEDURE — 93793 PR ANTICOAGULANT MGMT FOR PT TAKING WARFARIN: ICD-10-PCS | Mod: S$GLB,,,

## 2020-12-07 PROCEDURE — 93793 ANTICOAG MGMT PT WARFARIN: CPT | Mod: S$GLB,,,

## 2020-12-10 NOTE — PROGRESS NOTES
Stephenie with [facility] called requesting that lab draw due today be rescheduled to tomorrow 12/11/20 due to nurse not being able to get to the Patient today and not having anyone else available, as per Germain -AprilD told Stephenie it's ok to reschedule lab draw to 12/11/20 but needs to be 1st thing in am via venipuncture and run STAT, Order was faxed to [facility]

## 2020-12-11 ENCOUNTER — ANTI-COAG VISIT (OUTPATIENT)
Dept: CARDIOLOGY | Facility: CLINIC | Age: 55
End: 2020-12-11
Payer: MEDICARE

## 2020-12-11 DIAGNOSIS — Z95.811 LVAD (LEFT VENTRICULAR ASSIST DEVICE) PRESENT: ICD-10-CM

## 2020-12-11 LAB — INR PPP: 2.3

## 2020-12-11 PROCEDURE — 93793 ANTICOAG MGMT PT WARFARIN: CPT | Mod: S$GLB,,,

## 2020-12-11 PROCEDURE — 93793 PR ANTICOAGULANT MGMT FOR PT TAKING WARFARIN: ICD-10-PCS | Mod: S$GLB,,,

## 2020-12-17 ENCOUNTER — ANTI-COAG VISIT (OUTPATIENT)
Dept: CARDIOLOGY | Facility: CLINIC | Age: 55
End: 2020-12-17
Payer: MEDICARE

## 2020-12-17 ENCOUNTER — CLINICAL SUPPORT (OUTPATIENT)
Dept: TRANSPLANT | Facility: CLINIC | Age: 55
End: 2020-12-17
Payer: MEDICARE

## 2020-12-17 ENCOUNTER — LAB VISIT (OUTPATIENT)
Dept: LAB | Facility: HOSPITAL | Age: 55
End: 2020-12-17
Attending: INTERNAL MEDICINE
Payer: MEDICARE

## 2020-12-17 DIAGNOSIS — Z95.811 HEART REPLACED BY HEART ASSIST DEVICE: ICD-10-CM

## 2020-12-17 DIAGNOSIS — Z95.811 HEART REPLACED BY HEART ASSIST DEVICE: Primary | ICD-10-CM

## 2020-12-17 DIAGNOSIS — Z95.811 LVAD (LEFT VENTRICULAR ASSIST DEVICE) PRESENT: ICD-10-CM

## 2020-12-17 LAB
GRAM STN SPEC: NORMAL
GRAM STN SPEC: NORMAL
INR PPP: 2.5 (ref 0.8–1.2)
PROTHROMBIN TIME: 26.8 SEC (ref 9–12.5)

## 2020-12-17 PROCEDURE — 93793 ANTICOAG MGMT PT WARFARIN: CPT | Mod: S$GLB,,,

## 2020-12-17 PROCEDURE — 93793 PR ANTICOAGULANT MGMT FOR PT TAKING WARFARIN: ICD-10-PCS | Mod: S$GLB,,,

## 2020-12-17 PROCEDURE — 87205 SMEAR GRAM STAIN: CPT

## 2020-12-17 PROCEDURE — 87070 CULTURE OTHR SPECIMN AEROBIC: CPT

## 2020-12-17 PROCEDURE — 87075 CULTR BACTERIA EXCEPT BLOOD: CPT

## 2020-12-17 PROCEDURE — 87186 SC STD MICRODIL/AGAR DIL: CPT

## 2020-12-17 PROCEDURE — 87077 CULTURE AEROBIC IDENTIFY: CPT

## 2020-12-17 PROCEDURE — 36415 COLL VENOUS BLD VENIPUNCTURE: CPT

## 2020-12-17 PROCEDURE — 85610 PROTHROMBIN TIME: CPT

## 2020-12-17 RX ORDER — DOXYCYCLINE 100 MG/1
100 CAPSULE ORAL 2 TIMES DAILY
Qty: 90 CAPSULE | Refills: 0 | Status: SHIPPED | OUTPATIENT
Start: 2020-12-17 | End: 2021-01-25

## 2020-12-17 NOTE — PROGRESS NOTES
Pt here today for an INR blood draw.  He came up to clinic to report his abx from 2-3 weeks ago ran out and he still has drainage to his site.  He is asking for more abx.  Upon looking into this, he was here in clinic 11/19/2020, where DLES was cultured.  It resulted MRSA.  Discussed with Mickey Rider and Izabel Zavaleta.  We set pt up to see Dr. Dietz in clinic tomorrow, but pt states he can't drive back down here tomorrow.  Re-cultured today.  Small-moderate amount bloody drainage noted, no odor, no redness to surrounding area.  Since pt can't come back tomorrow, Mickey sent a RX to our pharmacy for doxy until this culture is resulted.  We will cancel the appt with Dr. Dietz tomorrow.  I will message Dr. Dietz asking for his input.   I did ask pt if he came here today just for an INR and he said yes.  I asked if he thought that was odd and he said yes, but he didn't question it.  I also reminded pt to call us if he has any questions and not wait until he comes here for an appt.  Pt verbalized understanding and agreement.     12/17/2020:      11/19/2020:

## 2020-12-20 LAB — BACTERIA SPEC AEROBE CULT: ABNORMAL

## 2020-12-22 ENCOUNTER — TELEPHONE (OUTPATIENT)
Dept: TRANSPLANT | Facility: CLINIC | Age: 55
End: 2020-12-22

## 2020-12-22 LAB — BACTERIA SPEC ANAEROBE CULT: NORMAL

## 2020-12-22 NOTE — TELEPHONE ENCOUNTER
----- Message from Umm Parish RN sent at 12/17/2020  2:16 PM CST -----  Regarding: LVAD DLES-coordinator

## 2020-12-22 NOTE — TELEPHONE ENCOUNTER
Sensitivity and culture results reviewed with Dr. Dietz.  Plan is for pt to continue on doxy and ID team to set up pt follow up for next week.  Will continue to follow along.

## 2020-12-23 ENCOUNTER — OFFICE VISIT (OUTPATIENT)
Dept: TRANSPLANT | Facility: CLINIC | Age: 55
End: 2020-12-23
Payer: MEDICARE

## 2020-12-23 ENCOUNTER — HOSPITAL ENCOUNTER (OUTPATIENT)
Dept: RADIOLOGY | Facility: HOSPITAL | Age: 55
Discharge: HOME | End: 2020-12-23
Attending: INTERNAL MEDICINE
Payer: MEDICARE

## 2020-12-23 ENCOUNTER — ANTI-COAG VISIT (OUTPATIENT)
Dept: CARDIOLOGY | Facility: CLINIC | Age: 55
End: 2020-12-23
Payer: MEDICARE

## 2020-12-23 ENCOUNTER — OFFICE VISIT (OUTPATIENT)
Dept: INFECTIOUS DISEASES | Facility: CLINIC | Age: 55
End: 2020-12-23
Payer: MEDICARE

## 2020-12-23 ENCOUNTER — CLINICAL SUPPORT (OUTPATIENT)
Dept: TRANSPLANT | Facility: CLINIC | Age: 55
End: 2020-12-23
Payer: MEDICARE

## 2020-12-23 VITALS — WEIGHT: 211 LBS | TEMPERATURE: 98 F | HEIGHT: 67 IN | BODY MASS INDEX: 33.12 KG/M2 | SYSTOLIC BLOOD PRESSURE: 70 MMHG

## 2020-12-23 DIAGNOSIS — Z95.811 HEART REPLACED BY HEART ASSIST DEVICE: ICD-10-CM

## 2020-12-23 DIAGNOSIS — Z95.811 HEART REPLACED BY HEART ASSIST DEVICE: Primary | ICD-10-CM

## 2020-12-23 DIAGNOSIS — F10.11 HISTORY OF ALCOHOL ABUSE: ICD-10-CM

## 2020-12-23 DIAGNOSIS — A49.02 MRSA INFECTION: ICD-10-CM

## 2020-12-23 DIAGNOSIS — I42.8 NON-ISCHEMIC CARDIOMYOPATHY: ICD-10-CM

## 2020-12-23 DIAGNOSIS — Z95.811 LVAD (LEFT VENTRICULAR ASSIST DEVICE) PRESENT: ICD-10-CM

## 2020-12-23 DIAGNOSIS — M79.604 PAIN OF RIGHT LOWER EXTREMITY: ICD-10-CM

## 2020-12-23 DIAGNOSIS — Z95.811 PRESENCE OF LEFT VENTRICULAR ASSIST DEVICE (LVAD): ICD-10-CM

## 2020-12-23 DIAGNOSIS — Z79.01 ANTICOAGULATION MONITORING, INR RANGE 2-3: ICD-10-CM

## 2020-12-23 DIAGNOSIS — T82.7XXA INFECTION ASSOCIATED WITH DRIVELINE OF LEFT VENTRICULAR ASSIST DEVICE (LVAD): Primary | ICD-10-CM

## 2020-12-23 DIAGNOSIS — Z95.810 AICD (AUTOMATIC CARDIOVERTER/DEFIBRILLATOR) PRESENT: ICD-10-CM

## 2020-12-23 DIAGNOSIS — I50.42 CHRONIC COMBINED SYSTOLIC AND DIASTOLIC HEART FAILURE: ICD-10-CM

## 2020-12-23 DIAGNOSIS — Z76.82 HEART TRANSPLANT CANDIDATE: ICD-10-CM

## 2020-12-23 DIAGNOSIS — Z87.891 HISTORY OF TOBACCO ABUSE: ICD-10-CM

## 2020-12-23 PROCEDURE — 74176 CT ABD & PELVIS W/O CONTRAST: CPT | Mod: TC

## 2020-12-23 PROCEDURE — 3078F PR MOST RECENT DIASTOLIC BLOOD PRESSURE < 80 MM HG: ICD-10-PCS | Mod: CPTII,S$GLB,, | Performed by: INTERNAL MEDICINE

## 2020-12-23 PROCEDURE — 99999 PR PBB SHADOW E&M-EST. PATIENT-LVL II: ICD-10-PCS | Mod: PBBFAC,,, | Performed by: INTERNAL MEDICINE

## 2020-12-23 PROCEDURE — 74176 CT CHEST ABDOMEN PELVIS WITHOUT CONTRAST(XPD): ICD-10-PCS | Mod: 26,,, | Performed by: RADIOLOGY

## 2020-12-23 PROCEDURE — 3074F PR MOST RECENT SYSTOLIC BLOOD PRESSURE < 130 MM HG: ICD-10-PCS | Mod: CPTII,S$GLB,, | Performed by: INTERNAL MEDICINE

## 2020-12-23 PROCEDURE — 71250 CT THORAX DX C-: CPT | Mod: 26,,, | Performed by: RADIOLOGY

## 2020-12-23 PROCEDURE — 1126F PR PAIN SEVERITY QUANTIFIED, NO PAIN PRESENT: ICD-10-PCS | Mod: S$GLB,,, | Performed by: INTERNAL MEDICINE

## 2020-12-23 PROCEDURE — 1126F AMNT PAIN NOTED NONE PRSNT: CPT | Mod: S$GLB,,, | Performed by: INTERNAL MEDICINE

## 2020-12-23 PROCEDURE — 99214 PR OFFICE/OUTPT VISIT, EST, LEVL IV, 30-39 MIN: ICD-10-PCS | Mod: S$GLB,,, | Performed by: INTERNAL MEDICINE

## 2020-12-23 PROCEDURE — 99214 OFFICE O/P EST MOD 30 MIN: CPT | Mod: 25,S$GLB,, | Performed by: INTERNAL MEDICINE

## 2020-12-23 PROCEDURE — 3074F SYST BP LT 130 MM HG: CPT | Mod: CPTII,S$GLB,, | Performed by: INTERNAL MEDICINE

## 2020-12-23 PROCEDURE — 71250 CT THORAX DX C-: CPT | Mod: TC

## 2020-12-23 PROCEDURE — 71250 CT CHEST ABDOMEN PELVIS WITHOUT CONTRAST(XPD): ICD-10-PCS | Mod: 26,,, | Performed by: RADIOLOGY

## 2020-12-23 PROCEDURE — 99999 PR PBB SHADOW E&M-EST. PATIENT-LVL III: ICD-10-PCS | Mod: PBBFAC,,, | Performed by: INTERNAL MEDICINE

## 2020-12-23 PROCEDURE — 3008F PR BODY MASS INDEX (BMI) DOCUMENTED: ICD-10-PCS | Mod: CPTII,S$GLB,, | Performed by: INTERNAL MEDICINE

## 2020-12-23 PROCEDURE — 99214 OFFICE O/P EST MOD 30 MIN: CPT | Mod: S$GLB,,, | Performed by: INTERNAL MEDICINE

## 2020-12-23 PROCEDURE — 99999 PR PBB SHADOW E&M-EST. PATIENT-LVL II: CPT | Mod: PBBFAC,,, | Performed by: INTERNAL MEDICINE

## 2020-12-23 PROCEDURE — 3078F DIAST BP <80 MM HG: CPT | Mod: CPTII,S$GLB,, | Performed by: INTERNAL MEDICINE

## 2020-12-23 PROCEDURE — 3008F BODY MASS INDEX DOCD: CPT | Mod: CPTII,S$GLB,, | Performed by: INTERNAL MEDICINE

## 2020-12-23 PROCEDURE — 99999 PR PBB SHADOW E&M-EST. PATIENT-LVL III: CPT | Mod: PBBFAC,,, | Performed by: INTERNAL MEDICINE

## 2020-12-23 PROCEDURE — 99214 PR OFFICE/OUTPT VISIT, EST, LEVL IV, 30-39 MIN: ICD-10-PCS | Mod: 25,S$GLB,, | Performed by: INTERNAL MEDICINE

## 2020-12-23 PROCEDURE — 74176 CT ABD & PELVIS W/O CONTRAST: CPT | Mod: 26,,, | Performed by: RADIOLOGY

## 2020-12-23 NOTE — LETTER
December 23, 2020        Miko Catalan  [address] [facility] [address]  Phone: [phone]  Fax: [phone]             [facility]  [address]  [facility] [address]  Phone: [phone]   Patient: Saba Lott   MR Number: [id number]   YOB: 1965   Date of Visit: 12/23/2020       Dear Dr. Miko Catalan    Thank you for referring Saba Lott to me for evaluation. Attached you will find relevant portions of my assessment and plan of care.    If you have questions, please do not hesitate to call me. I look forward to following Saba Lott along with you.    Sincerely,    Santosh Avalos MD    Enclosure    If you would like to receive this communication electronically, please contact [email or web address] or [phone] to request [employer] Link access.    [employer] Link is a tool which provides read-only access to select patient information with whom you have a relationship. Its easy to use and provides real time access to review your patients record including encounter summaries, notes, results, and demographic information.    If you feel you have received this communication in error or would no longer like to receive these types of communications, please e-mail [email or web address]

## 2020-12-23 NOTE — PROGRESS NOTES
"Date of Implant with Heartmate 3 LVAD: 2020    PATIENT ARRIVED IN CLINIC:  Ambulatory   Accompanied by: self    Vitals  Temperature, oral:   Temp Readings from Last 1 Encounters:   20 98.3 °F (36.8 °C) (Oral)     Blood Pressure:   BP Readings from Last 3 Encounters:   20 (!) 70/0   20 (!) 80/0   10/15/20 (!) 80/0        VAD Interrogation:  TXP CHRISTY INTERROGATIONS 2020 2020 10/15/2020   Type HeartMate3 HeartMate3 HeartMate3   Flow 4.5 4.4 4.4   Speed 5300 5300 5300   PI 3.7 3.4 4.2   Power (Centeno) 4.0 4.0 4.1   LSL 4900 4900 4900   Pulsatility Pulse Intermittent pulse No Pulse       History [id number]  Problems / Issues / Alarms with VAD if any: None noted  VAD Sounds: HM3 Smooth  Heartmate 3 Module Cable:  No yellow exposed and Attempted to unscrew modular cable to ensure it will be able to come lose in the event we ever need to change the modular cable while patient held the driveline in place so it would not move. Modular cable connection able to be unscrewed and re-tightened. Instructed pt to perform this weekly.    HCT:   Lab Results   Component Value Date    HCT 38.2 (L) 2020    HCT 30 (L) 2020       Complaints/reason for visit today: routine  Emergency Equipment With Patient: yes   VAD Binder With Patient: no   Reviewed VAD Numbers In Binder: no  Enrolled in Care Companion: yes    Any Equipment Issues: None noted (Refer to  note for complete details)    DLES Assessment:  Appearance Of Driveline: "2" w/ sanguineous, purulent drainage        Antibiotics: YES Doxy and cipro  Velour: no  Manual & Visual Inspection Of Driveline: No kinks or tears noted  Stabilization Device In Use: yes, barton securement device      Patient MyChart Questionnaire: No flowsheet data found.     Assessment:   PAIN: NO  Complaints Of Nausea / Vomiting: None noted    Appearance and Frequency Of Stools: normal and formed without blood & daily  Color Of Urine: " clear/yellow  Coping/Depression/Anxiety: coping okay  Sleep Habits: 4 hrs /night  Sleep Aids: None noted  Showering: No  Activity/Exercise: walking   Driving: Yes. Reminded to pull over should there be an alarm before looking down at controller.    DLES Dressing Care:   Frequency of Dressing Changes: daily & soap and water dressing  Pt In Need Of Management Kits?:yes -   2 Box of soap and water dressing  It is medically necessary to have VAD management kits in order to prevent infection or to assist in the healing of an infected DLES.    Labs:    Chemistry        Component Value Date/Time     11/30/2020    K 4.5 11/30/2020    CL 99 11/19/2020 1408    CL 99 07/21/2020    CO2 27 11/19/2020 1408    CO2 22 07/21/2020    BUN 16 11/30/2020    CREATININE 0.8 11/30/2020     11/19/2020 1408        Component Value Date/Time    CALCIUM 9.6 11/30/2020    ALKPHOS 111 11/19/2020 1408    AST 21 11/30/2020    AST 12 11/19/2020 1408    AST 19 04/27/2020    ALT 35 11/30/2020    ALT 14 11/19/2020 1408    ALT 18 05/04/2020    ALT 16 04/27/2020    BILITOT 0.7 11/19/2020 1408    ESTGFRAFRICA >60.0 11/19/2020 1408    EGFRNONAA >60.0 11/19/2020 1408            Magnesium   Date Value Ref Range Status   11/30/2020 2.0 mg/dL Final       Lab Results   Component Value Date    WBC 12.44 12/23/2020    HGB 11.7 (L) 12/23/2020    HCT 38.2 (L) 12/23/2020    MCV 80 (L) 12/23/2020     (H) 12/23/2020       Lab Results   Component Value Date    INR 2.5 (H) 12/17/2020    INR 2.30 12/11/2020    INR 2.0 12/07/2020       BNP   Date Value Ref Range Status   11/19/2020 101 (H) 0 - 99 pg/mL Final     Comment:     Values of less than 100 pg/ml are consistent with non-CHF populations.   10/15/2020 145 (H) 0 - 99 pg/mL Final     Comment:     Values of less than 100 pg/ml are consistent with non-CHF populations.   09/21/2020 132.8 pg/mL Final       LD   Date Value Ref Range Status   11/19/2020 144 110 - 260 U/L Final     Comment:     Results  "are increased in hemolyzed samples.   10/15/2020 175 110 - 260 U/L Final     Comment:     Results are increased in hemolyzed samples.   09/02/2020 170 110 - 260 U/L Final     Comment:     Results are increased in hemolyzed samples.       Labs reviewed with patient: YES      Patient Satisfaction Survey completed per patient: No  (explained about signature and box to check)  Medication reconciliation: per MA.  Medication Detail updated today: patient did not bring the card  Coumadin Managed by: [facility]    Education: Reviewed driveline care, emergency procedures, how to change the controller, alarms with patient, as well as discussed how to page the VAD coordinator in case of an emergency.   Covid - 19 education: Reminded patient/caregiver to check temperature daily and call us if it is > 99.0.  Reminded them  to stay 6 feet away from other people, wash hands frequently, don't touch your face and stay home except to get labs, medications, and appts.      Plans/Needs: Patient seen today in clinic for routine follow up. DLES continues to have drainage and is rated "2". Patient reports decreased pain at site. WBC's continuing to trend up. CT scan chest/abd/pelvis ordered per ID. Patient to return to VAD clinic in 1 month with echo and ID.    Hurricane Season: No      "

## 2020-12-23 NOTE — PROCEDURES
TXP CHRISTY INTERROGATIONS 12/23/2020 11/19/2020 10/15/2020 9/2/2020 7/29/2020 5/28/2020 4/30/2020   Type HeartMate3 HeartMate3 HeartMate3 HeartMate3 HeartMate3 HeartMate3 HeartMate3   Flow 4.5 4.4 4.4 4.2 4.2 3.8 4.0   Speed 5300 5300 5300 5300 5300 5300 5300   PI 3.7 3.4 4.2 4.9 4.2 5.5 5.1   Power (Centeno) 4.0 4.0 4.1 4.0 4.1 4.1 4.1   LSL 4900 4900 4900 4900 4900 4900 4900   Pulsatility Pulse Intermittent pulse No Pulse No Pulse No Pulse Intermittent pulse Intermittent pulse   }

## 2020-12-23 NOTE — PROGRESS NOTES
Subjective:   Patient ID:  Saba Lott is a 55 y.o. male who presents for LVAD followup visit.    Implant date:     TXP CHRISTY INTERROGATIONS 12/23/2020   Type HeartMate3   Flow 4.5   Speed 5300   PI 3.7   Power (Centeno) 4.0   LSL 4900   Pulsatility Pulse       HPI  Mr. Lott is a 54 year old AAM with DCM, HTN, tobacco and ETOH abuse (quit 2018), who presented to the hospital in January with ADHF/cardiogenic shock, was started on CRRT, had IABP placed, recovered from this from renal standpoint and labs, and proceeded with LVAD implant 02/06. Post op course was complicated by some RV failure requiring dobutamine, high dose diuretics, but was eventually weaned off successfully, with PICC and  weaned off as well. Patient had some post op thrombocytosis however heme onc consulted and felt it was reactive.      He comes for his regular clinic visit. Since last visit, pt has been doing well- walking about a mile a day and doesn't have to stop to catch his breath. No swelling, CP, orthopnea, PND. BM normal without blood. No LVAD alarms.     DLES is grade 1.     Interrogation of device data reveals no alarms, normal flows and power (see VAD interrogation report for full details.).     Review of Systems   Constitution: Negative for chills, decreased appetite, diaphoresis, fever, malaise/fatigue, weight gain and weight loss.   HENT: Negative for ear discharge, hearing loss and sore throat.    Eyes: Negative for blurred vision, double vision and visual disturbance.   Cardiovascular: Negative for chest pain, claudication, dyspnea on exertion, irregular heartbeat, leg swelling, near-syncope, orthopnea, palpitations, paroxysmal nocturnal dyspnea and syncope.   Respiratory: Negative for cough, hemoptysis, shortness of breath and wheezing.    Endocrine: Negative for cold intolerance and heat intolerance.   Skin: Negative for rash.   Gastrointestinal: Negative for bloating, abdominal pain, constipation, diarrhea, hematemesis,  hematochezia, jaundice, melena, nausea and vomiting.   Genitourinary: Negative for dysuria, frequency, hematuria, nocturia and urgency.   Neurological: Negative for dizziness, headaches and seizures.     Current Outpatient Medications on File Prior to Visit   Medication Sig Dispense Refill    albuterol (PROVENTIL/VENTOLIN HFA) 90 mcg/actuation inhaler Inhale 2 puffs into the lungs every 6 (six) hours as needed. Rescue      amLODIPine (NORVASC) 10 MG tablet Take 1 tablet (10 mg total) by mouth once daily. 30 tablet 11    aspirin 325 MG tablet Take 1 tablet (325 mg total) by mouth once daily. 30 tablet 11    atorvastatin (LIPITOR) 20 MG tablet Take 1 tablet (20 mg total) by mouth once daily. 30 tablet 11    bumetanide (BUMEX) 1 MG tablet Take 1 tablet (1 mg total) by mouth 2 (two) times daily. 180 tablet 3    ciprofloxacin HCl (CIPRO) 500 MG tablet Take 1 tablet (500 mg total) by mouth every 12 (twelve) hours. 14 tablet 0    clotrimazole-betamethasone 1-0.05% (LOTRISONE) cream Apply topically 2 (two) times daily. 1 Tube 0    docusate sodium (DOK) 100 MG capsule TAKE ONE CAPSULE BY MOUTH TWICE DAILY AS NEEDED FOR CONSTIPATION 60 capsule 0    doxycycline (MONODOX) 100 MG capsule Take 1 capsule (100 mg total) by mouth 2 (two) times daily. 90 capsule 0    enoxaparin (LOVENOX) 60 mg/0.6 mL Syrg Inject 0.5 mLs (50 mg total) into the skin 2 (two) times a day. 10 Syringe 1    hydrALAZINE (APRESOLINE) 100 MG tablet Take 1 tablet (100 mg total) by mouth every 8 (eight) hours. 270 tablet 3    lisinopriL (PRINIVIL,ZESTRIL) 5 MG tablet Take 1 tablet (5 mg total) by mouth once daily. 90 tablet 3    oxyCODONE (ROXICODONE) 5 MG immediate release tablet Take 1 tablet (5 mg total) by mouth every 8 (eight) hours as needed for Pain. 21 tablet 0    potassium chloride SA (K-DUR,KLOR-CON) 20 MEQ tablet Take 1 tablet (20 mEq total) by mouth once daily. 60 tablet 3    spironolactone (ALDACTONE) 25 MG tablet Take 1 tablet (25 mg  "total) by mouth once daily. 30 tablet 11    warfarin (COUMADIN) 7.5 MG tablet Take 1 tablet (7.5 mg total) by mouth Daily. 30 tablet 11    furosemide (LASIX) 20 MG tablet Take 3 tablets (60 mg total) by mouth once as needed. 30 tablet 3     No current facility-administered medications on file prior to visit.          Objective:   Blood pressure (!) 70/0, temperature 98.3 °F (36.8 °C), temperature source Oral, height 5' 7" (1.702 m), weight 95.7 kg (211 lb).body mass index is 33.05 kg/m².    Doppler: 70    Physical Exam   Constitutional: He is oriented to person, place, and time. He appears well-developed and well-nourished.   HENT:   Head: Normocephalic and atraumatic.   Right Ear: External ear normal.   Left Ear: External ear normal.   Eyes: Pupils are equal, round, and reactive to light. Conjunctivae and EOM are normal.   Neck: Normal range of motion. Neck supple. No JVD present.   Cardiovascular: Normal rate and regular rhythm. Exam reveals no gallop and no friction rub.   No murmur heard.  Normal LVAD hum.   Pulmonary/Chest: Effort normal and breath sounds normal.   Abdominal: Soft. Bowel sounds are normal. He exhibits no distension. There is no abdominal tenderness. There is no rebound and no guarding.   Musculoskeletal:         General: No edema.   Neurological: He is alert and oriented to person, place, and time. He has normal strength.               Lab Results   Component Value Date    WBC 12.44 12/23/2020    HGB 11.7 (L) 12/23/2020    HCT 38.2 (L) 12/23/2020    MCV 80 (L) 12/23/2020     (H) 12/23/2020    CO2 27 11/19/2020    CREATININE 0.8 11/30/2020    CALCIUM 9.6 11/30/2020    ALKALINEPHOS 144 05/04/2020    ALBUMIN 3.7 11/19/2020    AST 21 11/30/2020     (H) 11/19/2020    ALT 35 11/30/2020     11/19/2020       Lab Results   Component Value Date    INR 2.1 (H) 12/23/2020    INR 2.5 (H) 12/17/2020    INR 2.30 12/11/2020       BNP   Date Value Ref Range Status   11/19/2020 101 (H) 0 - " 99 pg/mL Final     Comment:     Values of less than 100 pg/ml are consistent with non-CHF populations.   10/15/2020 145 (H) 0 - 99 pg/mL Final     Comment:     Values of less than 100 pg/ml are consistent with non-CHF populations.   09/21/2020 132.8 pg/mL Final       LD   Date Value Ref Range Status   11/19/2020 144 110 - 260 U/L Final     Comment:     Results are increased in hemolyzed samples.   10/15/2020 175 110 - 260 U/L Final     Comment:     Results are increased in hemolyzed samples.   09/02/2020 170 110 - 260 U/L Final     Comment:     Results are increased in hemolyzed samples.           Assessment:      1. Heart replaced by heart assist device    2. Heart transplant candidate    3. History of tobacco abuse    4. Pain of right lower extremity    5. Anticoagulation monitoring, INR range 2-3    6. Presence of left ventricular assist device (LVAD)    7. LVAD (left ventricular assist device) present    8. Chronic combined systolic and diastolic heart failure    9. AICD (automatic cardioverter/defibrillator) present    10. Non-ischemic cardiomyopathy    11. History of alcohol abuse        Plan:   1. Chronic cardiomyopathy and systolic HF, NYHA class 1, stage D, s/p LVAD.  Hemodynamic status: warm and euvolemic, normotensive.    Antithrombotic therapy and target anticoagulation: INR 2.1. He takes aspirin 325 mg daily.    LVAD related complications:   -Bleeding (gastrointestinal, epistaxis, etc): no  -RV failure: no  -Thrombotic events and LDH:  None,   -DLES and Infection: DLES 2-3. Having secretion, mostly red. ID evaluated patient and he will continue to take ciprofloxacin and doxycycline and will have CT scan to determine extension of infection (WBC is 12k and higher than previous check).         Patient is now NYHA I  Recommend 2 gram sodium restriction and 1500cc fluid restriction.  Encourage physical activity with graded exercise program.  Requested patient to weigh themselves daily, and to notify  us if their weight increases by more than 3 lbs in 1 day or 5 lbs in 1 week.     Listed for transplant: No    UNOS Patient Status  Functional Status: 100% - Normal, no complaints, no evidence of disease  Physical Capacity: No Limitations  Working for Income: Unknown

## 2020-12-24 NOTE — PROGRESS NOTES
Subjective:      Patient ID: aSba Lott is a 55 y.o. male.    Chief Complaint:Recurrent Skin Infections      History of Present Illness    A 55-year-old man with dilated cardiomyopathy, HTN, prior tobacco plus EtOH abuse (quit 2018), s/p HM3 VAD placement on 2/2006, and recent MRSA DLES infection who was referred for evaluation due to his MRSA infection. Mr. Lott noted mucoid-purulent secretions with associated abdominal pain, and tenderness from his DLES. He was evaluated by the VAD team and stared on empiric doxycycline on 12/17/20. Cultures were positive for MRSA sensitive to tetracyclines. Today he has ongoing drainage however it has reduced in amount. His abdominal pain and tenderness have since resolved.     Mr. Lott has NKDA. Other history per EMR.     Review of Systems   Constitution: Negative for chills, decreased appetite, diaphoresis, fever, malaise/fatigue and night sweats.   HENT: Negative for congestion, hearing loss, hoarse voice and odynophagia.    Eyes: Negative for blurred vision.   Cardiovascular: Negative for chest pain, dyspnea on exertion, irregular heartbeat, leg swelling and palpitations.   Respiratory: Negative for cough, shortness of breath and wheezing.    Endocrine: Negative for cold intolerance, heat intolerance, polydipsia, polyphagia and polyuria.   Hematologic/Lymphatic: Negative for adenopathy and bleeding problem. Does not bruise/bleed easily.   Skin: Negative for color change, dry skin, itching, nail changes and rash.   Musculoskeletal: Negative for back pain, joint pain, joint swelling, muscle weakness and myalgias.   Gastrointestinal: Negative for bloating, abdominal pain, anorexia, change in bowel habit, constipation, diarrhea, dysphagia, heartburn, nausea and vomiting.   Genitourinary: Negative for bladder incontinence, dysuria, flank pain, frequency, hesitancy, incomplete emptying, nocturia and urgency.   Neurological: Negative for dizziness, headaches, light-headedness,  numbness, paresthesias, vertigo and weakness.   Psychiatric/Behavioral: Negative for altered mental status, depression and memory loss. The patient does not have insomnia and is not nervous/anxious.      Objective:   Physical Exam  Vitals signs reviewed.   Constitutional:       Appearance: He is well-developed.   HENT:      Head: Normocephalic and atraumatic.      Right Ear: External ear normal.      Left Ear: External ear normal.   Eyes:      Conjunctiva/sclera: Conjunctivae normal.   Neck:      Musculoskeletal: Normal range of motion and neck supple.      Thyroid: No thyromegaly.   Cardiovascular:      Rate and Rhythm: Normal rate and regular rhythm.      Heart sounds: No murmur.      Comments: VAD humming sounds.  Pulmonary:      Effort: Pulmonary effort is normal.      Breath sounds: Normal breath sounds. No wheezing or rales.   Abdominal:      General: Bowel sounds are normal.      Palpations: Abdomen is soft. There is no mass.      Tenderness: There is no abdominal tenderness. There is no rebound.      Comments: Right quadrant DLES with purulent and mildly bloody secretions. Small hypergranulation tissue noted at the 4 o'clock position from his DLES.   Musculoskeletal: Normal range of motion.   Lymphadenopathy:      Cervical: No cervical adenopathy.   Skin:     General: Skin is warm and dry.   Neurological:      Mental Status: He is alert and oriented to person, place, and time.   Psychiatric:         Behavior: Behavior normal.             Assessment:       1. Infection associated with driveline of left ventricular assist device (LVAD)    2. MRSA infection        Plan:   Patient with MRSA DLES infection with ongoing drainage. Recent cultures confirm sensitivity to tetracyclines.   · Continue doxycycline.   · Recommend CT of the abdomen to rule out fluid collections.   · RTC in 4 weeks.

## 2020-12-31 NOTE — PROGRESS NOTES
Spoke with Catrachita at [facility] . INR not drawn yesterday because they were unable to reach pt. Spoke with pt's wife who says that  nurse sent them a text saying they would not be able to come in. Order faxed to draw INR on 1/4/21

## 2021-01-01 ENCOUNTER — TELEPHONE (OUTPATIENT)
Dept: TRANSPLANT | Facility: CLINIC | Age: 56
End: 2021-01-01

## 2021-01-01 ENCOUNTER — LAB VISIT (OUTPATIENT)
Dept: LAB | Facility: HOSPITAL | Age: 56
End: 2021-01-01
Attending: INTERNAL MEDICINE
Payer: MEDICARE

## 2021-01-01 ENCOUNTER — DOCUMENT SCAN (OUTPATIENT)
Dept: HOME HEALTH SERVICES | Facility: HOSPITAL | Age: 56
End: 2021-01-01
Payer: MEDICARE

## 2021-01-01 ENCOUNTER — ANTI-COAG VISIT (OUTPATIENT)
Dept: CARDIOLOGY | Facility: CLINIC | Age: 56
End: 2021-01-01
Payer: MEDICARE

## 2021-01-01 ENCOUNTER — EXTERNAL HOME HEALTH (OUTPATIENT)
Dept: HOME HEALTH SERVICES | Facility: HOSPITAL | Age: 56
End: 2021-01-01
Payer: MEDICARE

## 2021-01-01 ENCOUNTER — HOSPITAL ENCOUNTER (INPATIENT)
Facility: HOSPITAL | Age: 56
LOS: 9 days | Discharge: HOME | DRG: 314 | End: 2021-02-28
Attending: EMERGENCY MEDICINE | Admitting: INTERNAL MEDICINE
Payer: MEDICARE

## 2021-01-01 ENCOUNTER — TELEPHONE (OUTPATIENT)
Dept: INFECTIOUS DISEASES | Facility: CLINIC | Age: 56
End: 2021-01-01

## 2021-01-01 ENCOUNTER — CLINICAL SUPPORT (OUTPATIENT)
Dept: TRANSPLANT | Facility: CLINIC | Age: 56
End: 2021-01-01
Payer: MEDICARE

## 2021-01-01 ENCOUNTER — TELEPHONE (OUTPATIENT)
Dept: INFECTIOUS DISEASES | Facility: HOSPITAL | Age: 56
End: 2021-01-01

## 2021-01-01 ENCOUNTER — DOCUMENTATION ONLY (OUTPATIENT)
Dept: TRANSPLANT | Facility: CLINIC | Age: 56
End: 2021-01-01

## 2021-01-01 ENCOUNTER — HOSPITAL ENCOUNTER (OUTPATIENT)
Dept: PULMONOLOGY | Facility: CLINIC | Age: 56
Discharge: HOME | End: 2021-06-28
Payer: MEDICARE

## 2021-01-01 ENCOUNTER — HOSPITAL ENCOUNTER (OUTPATIENT)
Dept: RADIOLOGY | Facility: HOSPITAL | Age: 56
Discharge: HOME | End: 2021-04-01
Attending: PHYSICIAN ASSISTANT
Payer: MEDICARE

## 2021-01-01 ENCOUNTER — OFFICE VISIT (OUTPATIENT)
Dept: INFECTIOUS DISEASES | Facility: CLINIC | Age: 56
End: 2021-01-01
Payer: MEDICARE

## 2021-01-01 ENCOUNTER — LAB VISIT (OUTPATIENT)
Dept: LAB | Facility: HOSPITAL | Age: 56
End: 2021-01-01
Attending: STUDENT IN AN ORGANIZED HEALTH CARE EDUCATION/TRAINING PROGRAM
Payer: MEDICARE

## 2021-01-01 ENCOUNTER — HOSPITAL ENCOUNTER (OUTPATIENT)
Dept: RADIOLOGY | Facility: HOSPITAL | Age: 56
Discharge: HOME | End: 2021-04-06
Attending: INTERNAL MEDICINE
Payer: MEDICARE

## 2021-01-01 ENCOUNTER — DOCUMENTATION ONLY (OUTPATIENT)
Dept: TRANSPLANT | Facility: CLINIC | Age: 56
End: 2021-01-01
Payer: MEDICARE

## 2021-01-01 ENCOUNTER — TELEPHONE (OUTPATIENT)
Dept: PALLIATIVE MEDICINE | Facility: CLINIC | Age: 56
End: 2021-01-01

## 2021-01-01 ENCOUNTER — HOSPITAL ENCOUNTER (OUTPATIENT)
Dept: RADIOLOGY | Facility: HOSPITAL | Age: 56
Discharge: HOME | End: 2021-05-28
Attending: PHYSICIAN ASSISTANT
Payer: MEDICARE

## 2021-01-01 ENCOUNTER — HOSPITAL ENCOUNTER (EMERGENCY)
Facility: HOSPITAL | Age: 56
Discharge: HOME | End: 2021-09-08
Attending: STUDENT IN AN ORGANIZED HEALTH CARE EDUCATION/TRAINING PROGRAM
Payer: MEDICARE

## 2021-01-01 ENCOUNTER — PATIENT OUTREACH (OUTPATIENT)
Dept: EMERGENCY MEDICINE | Facility: HOSPITAL | Age: 56
End: 2021-01-01

## 2021-01-01 ENCOUNTER — ANTI-COAG VISIT (OUTPATIENT)
Dept: CARDIOLOGY | Facility: CLINIC | Age: 56
End: 2021-01-01

## 2021-01-01 ENCOUNTER — PES CALL (OUTPATIENT)
Dept: ADMINISTRATIVE | Facility: CLINIC | Age: 56
End: 2021-01-01
Payer: MEDICARE

## 2021-01-01 ENCOUNTER — OFFICE VISIT (OUTPATIENT)
Dept: TRANSPLANT | Facility: CLINIC | Age: 56
End: 2021-01-01
Payer: MEDICARE

## 2021-01-01 ENCOUNTER — ANESTHESIA (OUTPATIENT)
Dept: MEDSURG UNIT | Facility: HOSPITAL | Age: 56
DRG: 315 | End: 2021-01-01
Payer: MEDICARE

## 2021-01-01 ENCOUNTER — TELEPHONE (OUTPATIENT)
Dept: TRANSPLANT | Facility: CLINIC | Age: 56
End: 2021-01-01
Payer: MEDICARE

## 2021-01-01 ENCOUNTER — ANESTHESIA EVENT (OUTPATIENT)
Dept: MEDSURG UNIT | Facility: HOSPITAL | Age: 56
DRG: 314 | End: 2021-01-01
Payer: MEDICARE

## 2021-01-01 ENCOUNTER — ANESTHESIA EVENT (OUTPATIENT)
Dept: MEDSURG UNIT | Facility: HOSPITAL | Age: 56
DRG: 260 | End: 2021-01-01
Payer: MEDICARE

## 2021-01-01 ENCOUNTER — HOSPITAL ENCOUNTER (INPATIENT)
Facility: HOSPITAL | Age: 56
LOS: 27 days | Discharge: HOME HEALTH | DRG: 260 | End: 2021-08-17
Attending: INTERNAL MEDICINE | Admitting: INTERNAL MEDICINE
Payer: MEDICARE

## 2021-01-01 ENCOUNTER — ANESTHESIA (OUTPATIENT)
Dept: MEDSURG UNIT | Facility: HOSPITAL | Age: 56
DRG: 260 | End: 2021-01-01
Payer: MEDICARE

## 2021-01-01 ENCOUNTER — TELEPHONE (OUTPATIENT)
Dept: INFECTIOUS DISEASES | Facility: CLINIC | Age: 56
End: 2021-01-01
Payer: MEDICARE

## 2021-01-01 ENCOUNTER — PATIENT OUTREACH (OUTPATIENT)
Dept: ADMINISTRATIVE | Facility: CLINIC | Age: 56
End: 2021-01-01

## 2021-01-01 ENCOUNTER — TELEPHONE (OUTPATIENT)
Dept: PALLIATIVE MEDICINE | Facility: CLINIC | Age: 56
End: 2021-01-01
Payer: MEDICARE

## 2021-01-01 ENCOUNTER — HOSPITAL ENCOUNTER (INPATIENT)
Facility: HOSPITAL | Age: 56
LOS: 7 days | Discharge: HOME HEALTH | DRG: 948 | End: 2021-03-19
Attending: EMERGENCY MEDICINE | Admitting: INTERNAL MEDICINE
Payer: MEDICARE

## 2021-01-01 ENCOUNTER — HOSPITAL ENCOUNTER (INPATIENT)
Facility: HOSPITAL | Age: 56
LOS: 1 days | Discharge: HOME | DRG: 315 | End: 2021-09-04
Attending: INTERNAL MEDICINE | Admitting: INTERNAL MEDICINE
Payer: MEDICARE

## 2021-01-01 ENCOUNTER — HOSPITAL ENCOUNTER (OUTPATIENT)
Dept: INTERVENTIONAL RADIOLOGY/VASCULAR | Facility: HOSPITAL | Age: 56
Discharge: HOME | End: 2021-05-28
Attending: PHYSICIAN ASSISTANT
Payer: MEDICARE

## 2021-01-01 ENCOUNTER — HOSPITAL ENCOUNTER (INPATIENT)
Facility: HOSPITAL | Age: 56
LOS: 9 days | Discharge: HOME HEALTH | DRG: 315 | End: 2021-05-06
Attending: EMERGENCY MEDICINE | Admitting: INTERNAL MEDICINE
Payer: MEDICARE

## 2021-01-01 ENCOUNTER — HOSPITAL ENCOUNTER (OUTPATIENT)
Dept: RADIOLOGY | Facility: HOSPITAL | Age: 56
Discharge: HOME | End: 2021-06-29
Attending: INTERNAL MEDICINE
Payer: MEDICARE

## 2021-01-01 ENCOUNTER — ANESTHESIA EVENT (OUTPATIENT)
Dept: MEDSURG UNIT | Facility: HOSPITAL | Age: 56
DRG: 315 | End: 2021-01-01
Payer: MEDICARE

## 2021-01-01 ENCOUNTER — HOSPITAL ENCOUNTER (INPATIENT)
Facility: HOSPITAL | Age: 56
LOS: 6 days | Discharge: HOME | DRG: 314 | End: 2021-10-11
Attending: INTERNAL MEDICINE | Admitting: INTERNAL MEDICINE
Payer: MEDICARE

## 2021-01-01 ENCOUNTER — OFFICE VISIT (OUTPATIENT)
Dept: INFECTIOUS DISEASES | Facility: CLINIC | Age: 56
End: 2021-01-01
Attending: STUDENT IN AN ORGANIZED HEALTH CARE EDUCATION/TRAINING PROGRAM
Payer: MEDICARE

## 2021-01-01 ENCOUNTER — HOSPITAL ENCOUNTER (EMERGENCY)
Facility: HOSPITAL | Age: 56
Discharge: HOME | End: 2021-09-30
Attending: EMERGENCY MEDICINE
Payer: MEDICARE

## 2021-01-01 ENCOUNTER — ANESTHESIA (OUTPATIENT)
Dept: MEDSURG UNIT | Facility: HOSPITAL | Age: 56
DRG: 314 | End: 2021-01-01
Payer: MEDICARE

## 2021-01-01 ENCOUNTER — OFFICE VISIT (OUTPATIENT)
Dept: TRANSPLANT | Facility: CLINIC | Age: 56
End: 2021-01-01
Attending: INTERNAL MEDICINE
Payer: MEDICARE

## 2021-01-01 ENCOUNTER — TELEPHONE (OUTPATIENT)
Dept: TRANSPLANT | Facility: HOSPITAL | Age: 56
End: 2021-01-01

## 2021-01-01 ENCOUNTER — HOSPITAL ENCOUNTER (INPATIENT)
Facility: HOSPITAL | Age: 56
LOS: 21 days | Discharge: HOME HEALTH | DRG: 871 | End: 2022-01-21
Attending: INTERNAL MEDICINE | Admitting: INTERNAL MEDICINE
Payer: MEDICARE

## 2021-01-01 ENCOUNTER — DOCUMENTATION ONLY (OUTPATIENT)
Dept: INFECTIOUS DISEASES | Facility: CLINIC | Age: 56
End: 2021-01-01

## 2021-01-01 ENCOUNTER — HOSPITAL ENCOUNTER (EMERGENCY)
Facility: HOSPITAL | Age: 56
Discharge: HOME | End: 2021-10-19
Attending: EMERGENCY MEDICINE
Payer: MEDICARE

## 2021-01-01 ENCOUNTER — OFFICE VISIT (OUTPATIENT)
Dept: PALLIATIVE MEDICINE | Facility: CLINIC | Age: 56
End: 2021-01-01
Payer: MEDICARE

## 2021-01-01 ENCOUNTER — HOSPITAL ENCOUNTER (EMERGENCY)
Facility: HOSPITAL | Age: 56
Discharge: HOME | End: 2021-11-17
Attending: EMERGENCY MEDICINE
Payer: MEDICARE

## 2021-01-01 ENCOUNTER — HOSPITAL ENCOUNTER (OUTPATIENT)
Dept: RADIOLOGY | Facility: HOSPITAL | Age: 56
Discharge: HOME | End: 2021-03-26
Attending: NEUROLOGICAL SURGERY
Payer: MEDICARE

## 2021-01-01 ENCOUNTER — HOSPITAL ENCOUNTER (EMERGENCY)
Facility: HOSPITAL | Age: 56
Discharge: ANOTHER HOSPITAL | End: 2021-09-03
Attending: EMERGENCY MEDICINE
Payer: MEDICARE

## 2021-01-01 VITALS
WEIGHT: 208.13 LBS | TEMPERATURE: 98 F | HEIGHT: 67 IN | SYSTOLIC BLOOD PRESSURE: 90 MMHG | DIASTOLIC BLOOD PRESSURE: 65 MMHG | OXYGEN SATURATION: 99 % | HEART RATE: 108 BPM | RESPIRATION RATE: 15 BRPM | BODY MASS INDEX: 32.67 KG/M2

## 2021-01-01 VITALS — BODY MASS INDEX: 31.84 KG/M2 | SYSTOLIC BLOOD PRESSURE: 82 MMHG | TEMPERATURE: 98 F | HEIGHT: 69 IN | WEIGHT: 215 LBS

## 2021-01-01 VITALS
TEMPERATURE: 98 F | OXYGEN SATURATION: 100 % | BODY MASS INDEX: 30.31 KG/M2 | RESPIRATION RATE: 18 BRPM | HEART RATE: 93 BPM | HEIGHT: 68 IN | SYSTOLIC BLOOD PRESSURE: 84 MMHG | WEIGHT: 200 LBS

## 2021-01-01 VITALS — HEIGHT: 69 IN | TEMPERATURE: 98 F | WEIGHT: 214.94 LBS | BODY MASS INDEX: 31.84 KG/M2

## 2021-01-01 VITALS
RESPIRATION RATE: 16 BRPM | OXYGEN SATURATION: 99 % | HEART RATE: 87 BPM | WEIGHT: 202.38 LBS | HEIGHT: 67 IN | BODY MASS INDEX: 31.76 KG/M2 | TEMPERATURE: 98 F | SYSTOLIC BLOOD PRESSURE: 74 MMHG

## 2021-01-01 VITALS
WEIGHT: 192.88 LBS | RESPIRATION RATE: 17 BRPM | HEART RATE: 116 BPM | HEIGHT: 69 IN | BODY MASS INDEX: 28.57 KG/M2 | SYSTOLIC BLOOD PRESSURE: 72 MMHG | TEMPERATURE: 98 F | OXYGEN SATURATION: 100 %

## 2021-01-01 VITALS — HEIGHT: 67 IN | TEMPERATURE: 99 F | WEIGHT: 211 LBS | BODY MASS INDEX: 33.12 KG/M2 | SYSTOLIC BLOOD PRESSURE: 88 MMHG

## 2021-01-01 VITALS
TEMPERATURE: 97 F | HEIGHT: 69 IN | WEIGHT: 205.69 LBS | OXYGEN SATURATION: 98 % | HEART RATE: 98 BPM | BODY MASS INDEX: 30.47 KG/M2 | DIASTOLIC BLOOD PRESSURE: 59 MMHG | SYSTOLIC BLOOD PRESSURE: 95 MMHG | RESPIRATION RATE: 18 BRPM

## 2021-01-01 VITALS
BODY MASS INDEX: 30.57 KG/M2 | WEIGHT: 207 LBS | SYSTOLIC BLOOD PRESSURE: 80 MMHG | OXYGEN SATURATION: 98 % | RESPIRATION RATE: 16 BRPM | HEART RATE: 100 BPM | TEMPERATURE: 98 F

## 2021-01-01 VITALS
OXYGEN SATURATION: 100 % | TEMPERATURE: 99 F | HEIGHT: 69 IN | RESPIRATION RATE: 20 BRPM | BODY MASS INDEX: 31.84 KG/M2 | SYSTOLIC BLOOD PRESSURE: 93 MMHG | WEIGHT: 215 LBS | HEART RATE: 99 BPM | DIASTOLIC BLOOD PRESSURE: 64 MMHG

## 2021-01-01 VITALS
HEIGHT: 68 IN | TEMPERATURE: 99 F | WEIGHT: 207.25 LBS | BODY MASS INDEX: 31.41 KG/M2 | RESPIRATION RATE: 18 BRPM | OXYGEN SATURATION: 95 %

## 2021-01-01 VITALS
HEART RATE: 78 BPM | SYSTOLIC BLOOD PRESSURE: 100 MMHG | RESPIRATION RATE: 16 BRPM | OXYGEN SATURATION: 98 % | TEMPERATURE: 98 F | DIASTOLIC BLOOD PRESSURE: 73 MMHG

## 2021-01-01 VITALS
HEART RATE: 101 BPM | TEMPERATURE: 98 F | RESPIRATION RATE: 16 BRPM | HEIGHT: 67 IN | OXYGEN SATURATION: 99 % | WEIGHT: 206.81 LBS | BODY MASS INDEX: 32.46 KG/M2 | SYSTOLIC BLOOD PRESSURE: 80 MMHG

## 2021-01-01 VITALS — WEIGHT: 210 LBS | HEIGHT: 69 IN | BODY MASS INDEX: 31.1 KG/M2 | TEMPERATURE: 99 F | SYSTOLIC BLOOD PRESSURE: 98 MMHG

## 2021-01-01 VITALS — SYSTOLIC BLOOD PRESSURE: 78 MMHG | TEMPERATURE: 98 F | WEIGHT: 210 LBS | HEIGHT: 67 IN | BODY MASS INDEX: 32.96 KG/M2

## 2021-01-01 VITALS
HEART RATE: 98 BPM | SYSTOLIC BLOOD PRESSURE: 117 MMHG | RESPIRATION RATE: 20 BRPM | TEMPERATURE: 99 F | WEIGHT: 205 LBS | HEIGHT: 69 IN | BODY MASS INDEX: 30.36 KG/M2 | OXYGEN SATURATION: 100 % | DIASTOLIC BLOOD PRESSURE: 85 MMHG

## 2021-01-01 VITALS
TEMPERATURE: 98 F | HEART RATE: 101 BPM | SYSTOLIC BLOOD PRESSURE: 95 MMHG | OXYGEN SATURATION: 99 % | RESPIRATION RATE: 20 BRPM | BODY MASS INDEX: 31.47 KG/M2 | WEIGHT: 213.06 LBS

## 2021-01-01 VITALS
TEMPERATURE: 98 F | HEIGHT: 68 IN | SYSTOLIC BLOOD PRESSURE: 68 MMHG | BODY MASS INDEX: 28.83 KG/M2 | RESPIRATION RATE: 18 BRPM | WEIGHT: 190.25 LBS | HEART RATE: 82 BPM | OXYGEN SATURATION: 96 %

## 2021-01-01 VITALS — BODY MASS INDEX: 32.65 KG/M2 | HEIGHT: 67 IN | WEIGHT: 208 LBS

## 2021-01-01 VITALS — HEART RATE: 93 BPM | DIASTOLIC BLOOD PRESSURE: 86 MMHG | SYSTOLIC BLOOD PRESSURE: 134 MMHG

## 2021-01-01 DIAGNOSIS — Z95.828 STATUS POST PICC CENTRAL LINE PLACEMENT: Primary | ICD-10-CM

## 2021-01-01 DIAGNOSIS — Z95.811 LVAD (LEFT VENTRICULAR ASSIST DEVICE) PRESENT: ICD-10-CM

## 2021-01-01 DIAGNOSIS — B95.62 MRSA BACTEREMIA: ICD-10-CM

## 2021-01-01 DIAGNOSIS — Z79.01 LONG TERM (CURRENT) USE OF ANTICOAGULANTS: ICD-10-CM

## 2021-01-01 DIAGNOSIS — T82.7XXA INFECTION ASSOCIATED WITH DRIVELINE OF LEFT VENTRICULAR ASSIST DEVICE (LVAD): ICD-10-CM

## 2021-01-01 DIAGNOSIS — I50.42 CHRONIC COMBINED SYSTOLIC AND DIASTOLIC CONGESTIVE HEART FAILURE: ICD-10-CM

## 2021-01-01 DIAGNOSIS — Z79.01 LONG TERM (CURRENT) USE OF ANTICOAGULANTS: Primary | ICD-10-CM

## 2021-01-01 DIAGNOSIS — I42.0 DCM (DILATED CARDIOMYOPATHY): ICD-10-CM

## 2021-01-01 DIAGNOSIS — Z95.811 HEART REPLACED BY HEART ASSIST DEVICE: Primary | ICD-10-CM

## 2021-01-01 DIAGNOSIS — L02.211 ABDOMINAL WALL ABSCESS: ICD-10-CM

## 2021-01-01 DIAGNOSIS — Z95.811 LVAD (LEFT VENTRICULAR ASSIST DEVICE) PRESENT: Primary | ICD-10-CM

## 2021-01-01 DIAGNOSIS — R78.81 MRSA BACTEREMIA: Primary | ICD-10-CM

## 2021-01-01 DIAGNOSIS — T82.7XXA INFECTION ASSOCIATED WITH DRIVELINE OF LEFT VENTRICULAR ASSIST DEVICE (LVAD): Primary | ICD-10-CM

## 2021-01-01 DIAGNOSIS — G47.00 INSOMNIA, UNSPECIFIED TYPE: ICD-10-CM

## 2021-01-01 DIAGNOSIS — Z95.810 AICD (AUTOMATIC CARDIOVERTER/DEFIBRILLATOR) PRESENT: ICD-10-CM

## 2021-01-01 DIAGNOSIS — T82.9XXA COMPLICATION INVOLVING LEFT VENTRICULAR ASSIST DEVICE (LVAD): ICD-10-CM

## 2021-01-01 DIAGNOSIS — I50.42 CHRONIC COMBINED SYSTOLIC AND DIASTOLIC HEART FAILURE: ICD-10-CM

## 2021-01-01 DIAGNOSIS — I61.1 NONTRAUMATIC CORTICAL HEMORRHAGE OF LEFT CEREBRAL HEMISPHERE: ICD-10-CM

## 2021-01-01 DIAGNOSIS — R78.81 MRSA BACTEREMIA: ICD-10-CM

## 2021-01-01 DIAGNOSIS — I49.9 ARRHYTHMIA: ICD-10-CM

## 2021-01-01 DIAGNOSIS — R78.81 BACTEREMIA DUE TO GRAM-NEGATIVE BACTERIA: ICD-10-CM

## 2021-01-01 DIAGNOSIS — I63.531: ICD-10-CM

## 2021-01-01 DIAGNOSIS — D64.9 ANEMIA, UNSPECIFIED TYPE: ICD-10-CM

## 2021-01-01 DIAGNOSIS — T82.9XXD COMPLICATION INVOLVING LEFT VENTRICULAR ASSIST DEVICE (LVAD), SUBSEQUENT ENCOUNTER: ICD-10-CM

## 2021-01-01 DIAGNOSIS — T82.9XXA COMPLICATION INVOLVING LEFT VENTRICULAR ASSIST DEVICE (LVAD), INITIAL ENCOUNTER: ICD-10-CM

## 2021-01-01 DIAGNOSIS — E78.2 MIXED HYPERLIPIDEMIA: ICD-10-CM

## 2021-01-01 DIAGNOSIS — I11.0 HYPERTENSIVE HEART DISEASE WITH CONGESTIVE HEART FAILURE: Primary | ICD-10-CM

## 2021-01-01 DIAGNOSIS — R78.81 BACTEREMIA: ICD-10-CM

## 2021-01-01 DIAGNOSIS — Z79.01 ANTICOAGULATION MONITORING, INR RANGE 2-3: ICD-10-CM

## 2021-01-01 DIAGNOSIS — I61.2 NONTRAUMATIC HEMORRHAGE OF LEFT CEREBRAL HEMISPHERE: ICD-10-CM

## 2021-01-01 DIAGNOSIS — G93.5 BRAIN COMPRESSION: ICD-10-CM

## 2021-01-01 DIAGNOSIS — Z95.811 PRESENCE OF LEFT VENTRICULAR ASSIST DEVICE (LVAD): ICD-10-CM

## 2021-01-01 DIAGNOSIS — Z01.89 NEEDS SLEEP APNEA ASSESSMENT: Primary | ICD-10-CM

## 2021-01-01 DIAGNOSIS — I49.8 OTHER SPECIFIED CARDIAC ARRHYTHMIAS: Primary | ICD-10-CM

## 2021-01-01 DIAGNOSIS — A41.02 SEPSIS DUE TO METHICILLIN RESISTANT STAPHYLOCOCCUS AUREUS (MRSA) WITHOUT ACUTE ORGAN DYSFUNCTION: Primary | ICD-10-CM

## 2021-01-01 DIAGNOSIS — R78.81 BACTEREMIA DUE TO STAPHYLOCOCCUS: ICD-10-CM

## 2021-01-01 DIAGNOSIS — T82.7XXA INFECTION ASSOCIATED WITH DRIVELINE OF LEFT VENTRICULAR ASSIST DEVICE (LVAD): Chronic | ICD-10-CM

## 2021-01-01 DIAGNOSIS — Z95.811 HEART REPLACED BY HEART ASSIST DEVICE: ICD-10-CM

## 2021-01-01 DIAGNOSIS — I50.9 CHF (CONGESTIVE HEART FAILURE): ICD-10-CM

## 2021-01-01 DIAGNOSIS — Z71.89 ADVANCE CARE PLANNING: ICD-10-CM

## 2021-01-01 DIAGNOSIS — Z79.2 RECEIVING INTRAVENOUS ANTIBIOTIC TREATMENT AS OUTPATIENT: ICD-10-CM

## 2021-01-01 DIAGNOSIS — L02.211 ABDOMINAL WALL ABSCESS: Primary | ICD-10-CM

## 2021-01-01 DIAGNOSIS — G93.6 CYTOTOXIC BRAIN EDEMA: ICD-10-CM

## 2021-01-01 DIAGNOSIS — E87.6 HYPOKALEMIA: ICD-10-CM

## 2021-01-01 DIAGNOSIS — T82.524A DISPLACEMENT OF PERIPHERALLY INSERTED CENTRAL CATHETER (PICC): Primary | ICD-10-CM

## 2021-01-01 DIAGNOSIS — R74.8 ELEVATED CPK: ICD-10-CM

## 2021-01-01 DIAGNOSIS — I50.9 HEART FAILURE: ICD-10-CM

## 2021-01-01 DIAGNOSIS — Z95.828 S/P PICC CENTRAL LINE PLACEMENT: Primary | ICD-10-CM

## 2021-01-01 DIAGNOSIS — A41.9 SEPSIS: ICD-10-CM

## 2021-01-01 DIAGNOSIS — A41.02 SEPSIS DUE TO METHICILLIN RESISTANT STAPHYLOCOCCUS AUREUS (MRSA), UNSPECIFIED WHETHER ACUTE ORGAN DYSFUNCTION PRESENT: Primary | ICD-10-CM

## 2021-01-01 DIAGNOSIS — A41.9 SEPSIS, DUE TO UNSPECIFIED ORGANISM, UNSPECIFIED WHETHER ACUTE ORGAN DYSFUNCTION PRESENT: ICD-10-CM

## 2021-01-01 DIAGNOSIS — M79.672 FOOT PAIN, LEFT: ICD-10-CM

## 2021-01-01 DIAGNOSIS — I61.9 LEFT-SIDED NONTRAUMATIC INTRACEREBRAL HEMORRHAGE, UNSPECIFIED CEREBRAL LOCATION: ICD-10-CM

## 2021-01-01 DIAGNOSIS — A41.9 SEPSIS WITHOUT ACUTE ORGAN DYSFUNCTION, DUE TO UNSPECIFIED ORGANISM: ICD-10-CM

## 2021-01-01 DIAGNOSIS — B95.62 MRSA BACTEREMIA: Primary | ICD-10-CM

## 2021-01-01 DIAGNOSIS — Z78.9 PROBLEM WITH VASCULAR ACCESS: Primary | ICD-10-CM

## 2021-01-01 DIAGNOSIS — E87.1 HYPONATREMIA: ICD-10-CM

## 2021-01-01 DIAGNOSIS — Z51.5 PALLIATIVE CARE ENCOUNTER: ICD-10-CM

## 2021-01-01 DIAGNOSIS — Z71.89 GOALS OF CARE, COUNSELING/DISCUSSION: ICD-10-CM

## 2021-01-01 DIAGNOSIS — T82.7XXA INFECTION AND INFLAMMATORY REACTION DUE TO CARDIAC DEVICE, IMPLANT, AND GRAFT: ICD-10-CM

## 2021-01-01 DIAGNOSIS — M54.2 NECK PAIN: ICD-10-CM

## 2021-01-01 DIAGNOSIS — R78.81 BACTEREMIA DUE TO STAPHYLOCOCCUS AUREUS: ICD-10-CM

## 2021-01-01 DIAGNOSIS — I50.9 HEART FAILURE, UNSPECIFIED HF CHRONICITY, UNSPECIFIED HEART FAILURE TYPE: Primary | ICD-10-CM

## 2021-01-01 DIAGNOSIS — R78.81 POSITIVE BLOOD CULTURES: Primary | ICD-10-CM

## 2021-01-01 DIAGNOSIS — Z22.322 MRSA (METHICILLIN RESISTANT STAPH AUREUS) CULTURE POSITIVE: ICD-10-CM

## 2021-01-01 DIAGNOSIS — I50.42 CHRONIC COMBINED SYSTOLIC AND DIASTOLIC HEART FAILURE: Primary | ICD-10-CM

## 2021-01-01 DIAGNOSIS — T82.7XXS INFECTION INVOLVING IMPLANTABLE CARDIOVERTER-DEFIBRILLATOR (ICD), SEQUELA: ICD-10-CM

## 2021-01-01 DIAGNOSIS — R50.9 FEVER: ICD-10-CM

## 2021-01-01 DIAGNOSIS — R00.0 SINUS TACHYCARDIA: ICD-10-CM

## 2021-01-01 DIAGNOSIS — A41.02 SEPSIS DUE TO METHICILLIN RESISTANT STAPHYLOCOCCUS AUREUS (MRSA) WITHOUT ACUTE ORGAN DYSFUNCTION: ICD-10-CM

## 2021-01-01 DIAGNOSIS — B95.8 BACTEREMIA DUE TO STAPHYLOCOCCUS: ICD-10-CM

## 2021-01-01 DIAGNOSIS — M54.9 BACK PAIN: ICD-10-CM

## 2021-01-01 DIAGNOSIS — T82.9XXS: ICD-10-CM

## 2021-01-01 DIAGNOSIS — R50.9 FEVER, UNSPECIFIED FEVER CAUSE: ICD-10-CM

## 2021-01-01 DIAGNOSIS — R52 PAIN: ICD-10-CM

## 2021-01-01 DIAGNOSIS — U07.1 COVID-19: ICD-10-CM

## 2021-01-01 DIAGNOSIS — T82.9XXS COMPLICATION INVOLVING LEFT VENTRICULAR ASSIST DEVICE (LVAD), SEQUELA: ICD-10-CM

## 2021-01-01 DIAGNOSIS — I50.20 HFREF (HEART FAILURE WITH REDUCED EJECTION FRACTION): ICD-10-CM

## 2021-01-01 DIAGNOSIS — A49.02 MRSA INFECTION: Primary | ICD-10-CM

## 2021-01-01 DIAGNOSIS — T82.528A: Primary | ICD-10-CM

## 2021-01-01 DIAGNOSIS — B95.61 BACTEREMIA DUE TO STAPHYLOCOCCUS AUREUS: ICD-10-CM

## 2021-01-01 DIAGNOSIS — B99.9 INFECTION: ICD-10-CM

## 2021-01-01 DIAGNOSIS — R79.1 SUBTHERAPEUTIC INTERNATIONAL NORMALIZED RATIO (INR): ICD-10-CM

## 2021-01-01 DIAGNOSIS — Z95.828 S/P PICC CENTRAL LINE PLACEMENT: ICD-10-CM

## 2021-01-01 LAB
ABO + RH BLD: NORMAL
ABO + RH BLD: NORMAL
ALBUMIN SERPL BCP-MCNC: 2.4 G/DL (ref 3.5–5.2)
ALBUMIN SERPL BCP-MCNC: 2.5 G/DL (ref 3.5–5.2)
ALBUMIN SERPL BCP-MCNC: 2.6 G/DL (ref 3.5–5.2)
ALBUMIN SERPL BCP-MCNC: 2.7 G/DL (ref 3.5–5.2)
ALBUMIN SERPL BCP-MCNC: 2.8 G/DL (ref 3.5–5.2)
ALBUMIN SERPL BCP-MCNC: 2.9 G/DL (ref 3.5–5.2)
ALBUMIN SERPL BCP-MCNC: 3 G/DL (ref 3.5–5.2)
ALBUMIN SERPL BCP-MCNC: 3.1 G/DL (ref 3.5–5.2)
ALBUMIN SERPL BCP-MCNC: 3.2 G/DL (ref 3.5–5.2)
ALBUMIN SERPL BCP-MCNC: 3.3 G/DL (ref 3.5–5.2)
ALBUMIN SERPL BCP-MCNC: 3.4 G/DL (ref 3.5–5.2)
ALBUMIN SERPL BCP-MCNC: 3.5 G/DL (ref 3.5–5.2)
ALBUMIN SERPL BCP-MCNC: 3.6 G/DL (ref 3.5–5.2)
ALBUMIN SERPL BCP-MCNC: 3.6 G/DL (ref 3.5–5.2)
ALLENS TEST: ABNORMAL
ALP ISOS SERPL LEV INH-CCNC: 112 U/L (ref 38–126)
ALP ISOS SERPL LEV INH-CCNC: 118 U/L (ref 38–126)
ALP SERPL-CCNC: 100 U/L (ref 55–135)
ALP SERPL-CCNC: 102 U/L (ref 55–135)
ALP SERPL-CCNC: 104 U/L (ref 55–135)
ALP SERPL-CCNC: 105 U/L (ref 55–135)
ALP SERPL-CCNC: 105 U/L (ref 55–135)
ALP SERPL-CCNC: 106 U/L (ref 55–135)
ALP SERPL-CCNC: 107 U/L (ref 55–135)
ALP SERPL-CCNC: 109 U/L (ref 55–135)
ALP SERPL-CCNC: 110 U/L (ref 55–135)
ALP SERPL-CCNC: 110 U/L (ref 55–135)
ALP SERPL-CCNC: 111 U/L (ref 55–135)
ALP SERPL-CCNC: 112 U/L (ref 55–135)
ALP SERPL-CCNC: 114 U/L (ref 55–135)
ALP SERPL-CCNC: 114 U/L (ref 55–135)
ALP SERPL-CCNC: 116 U/L (ref 55–135)
ALP SERPL-CCNC: 117 U/L (ref 55–135)
ALP SERPL-CCNC: 118 U/L (ref 55–135)
ALP SERPL-CCNC: 123 U/L (ref 55–135)
ALP SERPL-CCNC: 123 U/L (ref 55–135)
ALP SERPL-CCNC: 125 U/L (ref 55–135)
ALP SERPL-CCNC: 125 U/L (ref 55–135)
ALP SERPL-CCNC: 126 U/L (ref 55–135)
ALP SERPL-CCNC: 127 U/L (ref 55–135)
ALP SERPL-CCNC: 128 U/L (ref 55–135)
ALP SERPL-CCNC: 133 U/L (ref 55–135)
ALP SERPL-CCNC: 77 U/L (ref 55–135)
ALP SERPL-CCNC: 79 U/L (ref 55–135)
ALP SERPL-CCNC: 81 U/L (ref 55–135)
ALP SERPL-CCNC: 84 U/L (ref 55–135)
ALP SERPL-CCNC: 84 U/L (ref 55–135)
ALP SERPL-CCNC: 86 U/L (ref 55–135)
ALP SERPL-CCNC: 86 U/L (ref 55–135)
ALP SERPL-CCNC: 90 U/L (ref 55–135)
ALP SERPL-CCNC: 91 U/L (ref 55–135)
ALP SERPL-CCNC: 91 U/L (ref 55–135)
ALP SERPL-CCNC: 92 U/L (ref 55–135)
ALP SERPL-CCNC: 92 U/L (ref 55–135)
ALP SERPL-CCNC: 93 U/L (ref 55–135)
ALP SERPL-CCNC: 94 U/L (ref 55–135)
ALP SERPL-CCNC: 95 U/L (ref 55–135)
ALP SERPL-CCNC: 95 U/L (ref 55–135)
ALP SERPL-CCNC: 96 U/L (ref 55–135)
ALP SERPL-CCNC: 97 U/L (ref 55–135)
ALP SERPL-CCNC: 98 U/L (ref 55–135)
ALP SERPL-CCNC: 98 U/L (ref 55–135)
ALP SERPL-CCNC: 99 U/L (ref 55–135)
ALT SERPL W/O P-5'-P-CCNC: 10 U/L (ref 10–44)
ALT SERPL W/O P-5'-P-CCNC: 11 U/L (ref 10–44)
ALT SERPL W/O P-5'-P-CCNC: 11 U/L (ref 10–44)
ALT SERPL W/O P-5'-P-CCNC: 12 U/L (ref 10–44)
ALT SERPL W/O P-5'-P-CCNC: 12 U/L (ref 10–44)
ALT SERPL W/O P-5'-P-CCNC: 13 U/L (ref 10–44)
ALT SERPL W/O P-5'-P-CCNC: 15 U/L (ref 10–44)
ALT SERPL W/O P-5'-P-CCNC: 16 U/L (ref 10–44)
ALT SERPL W/O P-5'-P-CCNC: 17 U/L (ref 10–44)
ALT SERPL W/O P-5'-P-CCNC: 17 U/L (ref 10–44)
ALT SERPL W/O P-5'-P-CCNC: 19 U/L (ref 10–44)
ALT SERPL W/O P-5'-P-CCNC: 20 U/L (ref 10–44)
ALT SERPL W/O P-5'-P-CCNC: 22 U/L (ref 10–44)
ALT SERPL W/O P-5'-P-CCNC: 24 U/L (ref 10–44)
ALT SERPL W/O P-5'-P-CCNC: 27 U/L (ref 10–44)
ALT SERPL W/O P-5'-P-CCNC: 28 U/L (ref 10–44)
ALT SERPL W/O P-5'-P-CCNC: 29 U/L (ref 10–44)
ALT SERPL W/O P-5'-P-CCNC: 32 U/L (ref 10–44)
ALT SERPL W/O P-5'-P-CCNC: 32 U/L (ref 10–44)
ALT SERPL W/O P-5'-P-CCNC: 33 U/L (ref 10–44)
ALT SERPL W/O P-5'-P-CCNC: 34 U/L (ref 10–44)
ALT SERPL W/O P-5'-P-CCNC: 37 U/L (ref 10–44)
ALT SERPL W/O P-5'-P-CCNC: 37 U/L (ref 10–44)
ALT SERPL W/O P-5'-P-CCNC: 39 U/L (ref 10–44)
ALT SERPL W/O P-5'-P-CCNC: 40 U/L (ref 10–44)
ALT SERPL W/O P-5'-P-CCNC: 40 U/L (ref 10–44)
ALT SERPL W/O P-5'-P-CCNC: 42 U/L (ref 10–44)
ALT SERPL W/O P-5'-P-CCNC: 45 U/L (ref 10–44)
ALT SERPL W/O P-5'-P-CCNC: 46 U/L (ref 10–44)
ALT SERPL W/O P-5'-P-CCNC: 47 U/L (ref 10–44)
ALT SERPL W/O P-5'-P-CCNC: 47 U/L (ref 10–44)
ALT SERPL W/O P-5'-P-CCNC: 50 U/L (ref 10–44)
ALT SERPL W/O P-5'-P-CCNC: 59 U/L (ref 10–44)
ALT SERPL W/O P-5'-P-CCNC: 7 U/L (ref 10–44)
ALT SERPL W/O P-5'-P-CCNC: 73 U/L (ref 10–44)
ALT SERPL W/O P-5'-P-CCNC: 9 U/L (ref 10–44)
ALT SERPL-CCNC: 20 U/L (ref 0–50)
ALT SERPL-CCNC: 26 U/L (ref 0–50)
ANION GAP SERPL CALC-SCNC: 10 MMOL/L (ref 8–16)
ANION GAP SERPL CALC-SCNC: 11 MMOL/L (ref 8–16)
ANION GAP SERPL CALC-SCNC: 12 MMOL/L (ref 8–16)
ANION GAP SERPL CALC-SCNC: 13 MMOL/L (ref 8–16)
ANION GAP SERPL CALC-SCNC: 14 MMOL/L (ref 8–16)
ANION GAP SERPL CALC-SCNC: 17 MMOL/L (ref 8–16)
ANION GAP SERPL CALC-SCNC: 6 MMOL/L (ref 8–16)
ANION GAP SERPL CALC-SCNC: 6 MMOL/L (ref 8–16)
ANION GAP SERPL CALC-SCNC: 7 MMOL/L (ref 8–16)
ANION GAP SERPL CALC-SCNC: 8 MMOL/L (ref 8–16)
ANION GAP SERPL CALC-SCNC: 9 MMOL/L (ref 8–16)
ANISOCYTOSIS BLD QL SMEAR: SLIGHT
APTT BLDCRRT: 23.1 SEC (ref 21–32)
APTT BLDCRRT: 24.3 SEC (ref 21–32)
APTT BLDCRRT: 24.8 SEC (ref 21–32)
APTT BLDCRRT: 25.2 SEC (ref 21–32)
APTT BLDCRRT: 25.6 SEC (ref 21–32)
APTT BLDCRRT: 25.9 SEC (ref 21–32)
APTT BLDCRRT: 28.2 SEC (ref 21–32)
APTT BLDCRRT: 28.9 SEC (ref 21–32)
APTT BLDCRRT: 29.1 SEC (ref 21–32)
APTT BLDCRRT: 29.6 SEC (ref 21–32)
APTT BLDCRRT: 29.6 SEC (ref 21–32)
APTT BLDCRRT: 30.3 SEC (ref 21–32)
APTT BLDCRRT: 30.8 SEC (ref 21–32)
APTT BLDCRRT: 31 SEC (ref 21–32)
APTT BLDCRRT: 31.4 SEC (ref 21–32)
APTT BLDCRRT: 32.2 SEC (ref 21–32)
APTT BLDCRRT: 32.4 SEC (ref 21–32)
APTT BLDCRRT: 32.4 SEC (ref 21–32)
APTT BLDCRRT: 32.7 SEC (ref 21–32)
APTT BLDCRRT: 32.8 SEC (ref 21–32)
APTT BLDCRRT: 33 SEC (ref 21–32)
APTT BLDCRRT: 33.1 SEC (ref 21–32)
APTT BLDCRRT: 33.7 SEC (ref 21–32)
APTT BLDCRRT: 34 SEC (ref 21–32)
APTT BLDCRRT: 34.2 SEC (ref 21–32)
APTT BLDCRRT: 34.2 SEC (ref 21–32)
APTT BLDCRRT: 34.3 SEC (ref 21–32)
APTT BLDCRRT: 34.5 SEC (ref 21–32)
APTT BLDCRRT: 34.5 SEC (ref 21–32)
APTT BLDCRRT: 34.8 SEC (ref 21–32)
APTT BLDCRRT: 35.3 SEC (ref 21–32)
APTT BLDCRRT: 35.4 SEC (ref 21–32)
APTT BLDCRRT: 35.6 SEC (ref 21–32)
APTT BLDCRRT: 35.6 SEC (ref 21–32)
APTT BLDCRRT: 35.7 SEC (ref 21–32)
APTT BLDCRRT: 36.5 SEC (ref 21–32)
APTT BLDCRRT: 36.6 SEC (ref 21–32)
APTT BLDCRRT: 36.6 SEC (ref 21–32)
APTT BLDCRRT: 38.1 SEC (ref 21–32)
APTT BLDCRRT: 38.3 SEC (ref 21–32)
APTT BLDCRRT: 38.3 SEC (ref 21–32)
APTT BLDCRRT: 38.4 SEC (ref 21–32)
APTT BLDCRRT: 38.7 SEC (ref 21–32)
APTT BLDCRRT: 39.6 SEC (ref 21–32)
APTT BLDCRRT: 39.6 SEC (ref 21–32)
APTT BLDCRRT: 39.8 SEC (ref 21–32)
APTT BLDCRRT: 39.8 SEC (ref 21–32)
APTT BLDCRRT: 39.9 SEC (ref 21–32)
APTT BLDCRRT: 41.3 SEC (ref 21–32)
APTT BLDCRRT: 41.3 SEC (ref 21–32)
APTT BLDCRRT: 42.1 SEC (ref 21–32)
APTT BLDCRRT: 42.3 SEC (ref 21–32)
APTT BLDCRRT: 42.3 SEC (ref 21–32)
APTT BLDCRRT: 42.9 SEC (ref 21–32)
APTT BLDCRRT: 42.9 SEC (ref 21–32)
APTT BLDCRRT: 43 SEC (ref 21–32)
APTT BLDCRRT: 43.6 SEC (ref 21–32)
APTT BLDCRRT: 43.9 SEC (ref 21–32)
APTT BLDCRRT: 44 SEC (ref 21–32)
APTT BLDCRRT: 44.7 SEC (ref 21–32)
APTT BLDCRRT: 44.8 SEC (ref 21–32)
APTT BLDCRRT: 46.1 SEC (ref 21–32)
APTT BLDCRRT: 46.9 SEC (ref 21–32)
APTT BLDCRRT: 47.4 SEC (ref 21–32)
APTT BLDCRRT: 47.8 SEC (ref 21–32)
APTT BLDCRRT: 48 SEC (ref 21–32)
APTT BLDCRRT: 48.1 SEC (ref 21–32)
APTT BLDCRRT: 48.1 SEC (ref 21–32)
APTT BLDCRRT: 48.4 SEC (ref 21–32)
APTT BLDCRRT: 48.5 SEC (ref 21–32)
APTT BLDCRRT: 49 SEC (ref 21–32)
APTT BLDCRRT: 49.7 SEC (ref 21–32)
APTT BLDCRRT: 49.8 SEC (ref 21–32)
APTT BLDCRRT: 50.9 SEC (ref 21–32)
APTT BLDCRRT: 51 SEC (ref 21–32)
APTT BLDCRRT: 51 SEC (ref 21–32)
APTT BLDCRRT: 51.9 SEC (ref 21–32)
APTT BLDCRRT: 52 SEC (ref 21–32)
APTT BLDCRRT: 54.1 SEC (ref 21–32)
APTT BLDCRRT: 55.5 SEC (ref 21–32)
APTT BLDCRRT: 58.1 SEC (ref 21–32)
APTT BLDCRRT: 60.1 SEC (ref 21–32)
ASCENDING AORTA: 2.98 CM
ASCENDING AORTA: 3.02 CM
ASCENDING AORTA: 3.17 CM
AST SERPL-CCNC: 10 U/L (ref 10–40)
AST SERPL-CCNC: 11 U/L (ref 10–40)
AST SERPL-CCNC: 110 U/L (ref 10–40)
AST SERPL-CCNC: 12 U/L (ref 10–40)
AST SERPL-CCNC: 13 U/L (ref 10–40)
AST SERPL-CCNC: 14 U/L (ref 10–40)
AST SERPL-CCNC: 15 U/L (ref 10–40)
AST SERPL-CCNC: 16 U/L (ref 10–40)
AST SERPL-CCNC: 17 U/L (ref 10–40)
AST SERPL-CCNC: 19 U/L (ref 10–40)
AST SERPL-CCNC: 20 U/L (ref 10–40)
AST SERPL-CCNC: 22 U/L (ref 10–40)
AST SERPL-CCNC: 22 U/L (ref 10–40)
AST SERPL-CCNC: 23 U/L (ref 10–40)
AST SERPL-CCNC: 24 U/L (ref 10–40)
AST SERPL-CCNC: 24 U/L (ref 10–40)
AST SERPL-CCNC: 28 U/L (ref 10–40)
AST SERPL-CCNC: 29 U/L (ref 10–40)
AST SERPL-CCNC: 30 U/L (ref 10–40)
AST SERPL-CCNC: 31 U/L (ref 10–40)
AST SERPL-CCNC: 32 U/L (ref 10–40)
AST SERPL-CCNC: 32 U/L (ref 10–40)
AST SERPL-CCNC: 33 U/L (ref 10–40)
AST SERPL-CCNC: 33 U/L (ref 10–40)
AST SERPL-CCNC: 34 U/L (ref 10–40)
AST SERPL-CCNC: 36 U/L (ref 10–40)
AST SERPL-CCNC: 37 U/L (ref 10–40)
AST SERPL-CCNC: 37 U/L (ref 10–40)
AST SERPL-CCNC: 40 U/L (ref 10–40)
AST SERPL-CCNC: 42 U/L (ref 10–40)
AST SERPL-CCNC: 43 U/L (ref 10–40)
AST SERPL-CCNC: 44 U/L (ref 10–40)
AST SERPL-CCNC: 52 U/L (ref 10–40)
AST: 22 U/L (ref 14–59)
AST: 35 U/L (ref 14–59)
BACTERIA BLD CULT: ABNORMAL
BACTERIA BLD CULT: NORMAL
BACTERIA SPEC AEROBE CULT: ABNORMAL
BACTERIA SPEC AEROBE CULT: NO GROWTH
BACTERIA SPEC AEROBE CULT: NO GROWTH
BACTERIA SPEC ANAEROBE CULT: NORMAL
BASOPHILS # BLD AUTO: 0.02 K/UL (ref 0–0.2)
BASOPHILS # BLD AUTO: 0.03 K/UL (ref 0–0.2)
BASOPHILS # BLD AUTO: 0.04 K/UL (ref 0–0.2)
BASOPHILS # BLD AUTO: 0.05 K/UL (ref 0–0.2)
BASOPHILS # BLD AUTO: 0.06 K/UL (ref 0–0.2)
BASOPHILS # BLD AUTO: 0.06 K/UL (ref 0–0.2)
BASOPHILS # BLD AUTO: 0.07 K/UL (ref 0–0.2)
BASOPHILS # BLD AUTO: 0.08 K/UL (ref 0–0.2)
BASOPHILS # BLD AUTO: 0.08 K/UL (ref 0–0.2)
BASOPHILS NFR BLD: 0.1 % (ref 0–1.9)
BASOPHILS NFR BLD: 0.2 % (ref 0–1.9)
BASOPHILS NFR BLD: 0.3 % (ref 0–1.9)
BASOPHILS NFR BLD: 0.4 % (ref 0–1.9)
BASOPHILS NFR BLD: 0.5 % (ref 0–1.9)
BASOPHILS NFR BLD: 0.6 % (ref 0–1.9)
BASOPHILS NFR BLD: 0.6 % (ref 0–1.9)
BASOPHILS NFR BLD: 0.8 % (ref 0–1.9)
BASOPHILS NFR BLD: 0.9 % (ref 0–1.9)
BILIRUB DIRECT SERPL-MCNC: 0.1 MG/DL (ref 0.1–0.3)
BILIRUB DIRECT SERPL-MCNC: 0.1 MG/DL (ref 0.1–0.3)
BILIRUB DIRECT SERPL-MCNC: 0.2 MG/DL (ref 0.1–0.3)
BILIRUB DIRECT SERPL-MCNC: 0.3 MG/DL (ref 0.1–0.3)
BILIRUB DIRECT SERPL-MCNC: 0.4 MG/DL (ref 0.1–0.3)
BILIRUB DIRECT SERPL-MCNC: 0.4 MG/DL (ref 0.1–0.3)
BILIRUB SERPL-MCNC: 0.3 MG/DL (ref 0.1–1)
BILIRUB SERPL-MCNC: 0.4 MG/DL (ref 0.1–1)
BILIRUB SERPL-MCNC: 0.5 MG/DL (ref 0.1–1)
BILIRUB SERPL-MCNC: 0.6 MG/DL (ref 0.1–1)
BILIRUB SERPL-MCNC: 0.7 MG/DL (ref 0.1–1)
BILIRUB SERPL-MCNC: 0.8 MG/DL (ref 0.1–1)
BILIRUB SERPL-MCNC: 1 MG/DL (ref 0.1–1)
BILIRUBIN TOTAL: 0.1
BILIRUBIN TOTAL: 0.1
BILIRUBIN TOTAL: 0.16 MG/DL (ref 0.2–1.3)
BILIRUBIN TOTAL: 0.16 MG/DL (ref 0.2–1.3)
BILIRUBIN TOTAL: 0.27
BILIRUBIN TOTAL: 0.49
BILIRUBIN TOTAL: 0.5
BILIRUBIN TOTAL: 0.73
BLD GP AB SCN CELLS X3 SERPL QL: NORMAL
BLD GP AB SCN CELLS X3 SERPL QL: NORMAL
BLD PROD TYP BPU: NORMAL
BLOOD UNIT EXPIRATION DATE: NORMAL
BLOOD UNIT TYPE CODE: 5100
BLOOD UNIT TYPE: NORMAL
BNP SERPL-MCNC: 129 PG/ML (ref 0–99)
BNP SERPL-MCNC: 160 PG/ML (ref 0–99)
BNP SERPL-MCNC: 19 PG/ML (ref 0–99)
BNP SERPL-MCNC: 20 PG/ML (ref 0–99)
BNP SERPL-MCNC: 22 PG/ML (ref 0–99)
BNP SERPL-MCNC: 22 PG/ML (ref 0–99)
BNP SERPL-MCNC: 24 PG/ML (ref 0–99)
BNP SERPL-MCNC: 24 PG/ML (ref 0–99)
BNP SERPL-MCNC: 242 PG/ML (ref 0–99)
BNP SERPL-MCNC: 27 PG/ML (ref 0–99)
BNP SERPL-MCNC: 29 PG/ML (ref 0–99)
BNP SERPL-MCNC: 29 PG/ML (ref 0–99)
BNP SERPL-MCNC: 32 PG/ML (ref 0–99)
BNP SERPL-MCNC: 37 PG/ML (ref 0–99)
BNP SERPL-MCNC: 37 PG/ML (ref 0–99)
BNP SERPL-MCNC: 38 PG/ML (ref 0–99)
BNP SERPL-MCNC: 41 PG/ML (ref 0–99)
BNP SERPL-MCNC: 41 PG/ML (ref 0–99)
BNP SERPL-MCNC: 426 PG/ML (ref 0–99)
BNP SERPL-MCNC: 44 PG/ML (ref 0–99)
BNP SERPL-MCNC: 50 PG/ML (ref 0–99)
BNP SERPL-MCNC: 50 PG/ML (ref 0–99)
BNP SERPL-MCNC: 56 PG/ML (ref 0–99)
BNP SERPL-MCNC: 748 PG/ML (ref 0–99)
BNP SERPL-MCNC: 78 PG/ML (ref 0–99)
BNP SERPL-MCNC: 96 PG/ML (ref 0–99)
BNP SERPL-MCNC: 99 PG/ML (ref 0–99)
BNP: 304
BNP: 399
BSA FOR ECHO PROCEDURE: 2.05 M2
BSA FOR ECHO PROCEDURE: 2.09 M2
BSA FOR ECHO PROCEDURE: 2.1 M2
BSA FOR ECHO PROCEDURE: 2.11 M2
BSA FOR ECHO PROCEDURE: 2.12 M2
BSA FOR ECHO PROCEDURE: 2.13 M2
BUN BLD-MCNC: 11 MG/DL (ref 9–20)
BUN BLD-MCNC: 14 MG/DL (ref 9–20)
BUN SERPL-MCNC: 10 MG/DL (ref 6–20)
BUN SERPL-MCNC: 11 MG/DL (ref 6–20)
BUN SERPL-MCNC: 12 MG/DL (ref 6–20)
BUN SERPL-MCNC: 13 MG/DL (ref 6–20)
BUN SERPL-MCNC: 14 MG/DL (ref 6–20)
BUN SERPL-MCNC: 15 MG/DL (ref 6–20)
BUN SERPL-MCNC: 17 MG/DL (ref 6–20)
BUN SERPL-MCNC: 18 MG/DL (ref 6–20)
BUN SERPL-MCNC: 19 MG/DL (ref 6–20)
BUN SERPL-MCNC: 20 MG/DL (ref 6–20)
BUN SERPL-MCNC: 21 MG/DL (ref 6–20)
BUN SERPL-MCNC: 22 MG/DL (ref 6–20)
BUN SERPL-MCNC: 22 MG/DL (ref 6–20)
BUN SERPL-MCNC: 24 MG/DL (ref 6–20)
BUN SERPL-MCNC: 24 MG/DL (ref 6–20)
BUN SERPL-MCNC: 25 MG/DL (ref 6–20)
BUN SERPL-MCNC: 26 MG/DL (ref 6–20)
BUN SERPL-MCNC: 3 MG/DL (ref 6–20)
BUN SERPL-MCNC: 3 MG/DL (ref 6–20)
BUN SERPL-MCNC: 4 MG/DL (ref 6–20)
BUN SERPL-MCNC: 5 MG/DL (ref 6–20)
BUN SERPL-MCNC: 6 MG/DL (ref 6–20)
BUN SERPL-MCNC: 7 MG/DL (ref 6–20)
BUN SERPL-MCNC: 8 MG/DL (ref 6–20)
BUN SERPL-MCNC: 8 MG/DL (ref 6–30)
BUN SERPL-MCNC: 9 MG/DL (ref 6–20)
BURR CELLS BLD QL SMEAR: ABNORMAL
BURR CELLS BLD QL SMEAR: ABNORMAL
C DIFF GDH STL QL: NEGATIVE
C DIFF TOX A+B STL QL IA: NEGATIVE
CALCIUM SERPL-MCNC: 10 MG/DL (ref 8.7–10.5)
CALCIUM SERPL-MCNC: 7.9 MG/DL (ref 8.7–10.5)
CALCIUM SERPL-MCNC: 8.1 MG/DL (ref 8.7–10.5)
CALCIUM SERPL-MCNC: 8.5 MG/DL (ref 8.7–10.5)
CALCIUM SERPL-MCNC: 8.6 MG/DL (ref 8.7–10.5)
CALCIUM SERPL-MCNC: 8.7 MG/DL (ref 8.7–10.5)
CALCIUM SERPL-MCNC: 8.8 MG/DL (ref 8.7–10.5)
CALCIUM SERPL-MCNC: 8.9 MG/DL (ref 8.7–10.5)
CALCIUM SERPL-MCNC: 9 MG/DL (ref 8.7–10.5)
CALCIUM SERPL-MCNC: 9.1 MG/DL (ref 8.7–10.5)
CALCIUM SERPL-MCNC: 9.2 MG/DL (ref 8.7–10.5)
CALCIUM SERPL-MCNC: 9.3 MG/DL (ref 8.7–10.5)
CALCIUM SERPL-MCNC: 9.4 MG/DL (ref 8.7–10.5)
CALCIUM SERPL-MCNC: 9.5 MG/DL (ref 8.7–10.5)
CALCIUM SERPL-MCNC: 9.6 MG/DL (ref 8.7–10.5)
CALCIUM SERPL-MCNC: 9.7 MG/DL (ref 8.7–10.5)
CALCIUM SERPL-MCNC: 9.8 MG/DL (ref 8.7–10.5)
CALCIUM SERPL-MCNC: 9.9 MG/DL (ref 8.7–10.5)
CHLORIDE SERPL-SCNC: 100 MMOL/L (ref 95–110)
CHLORIDE SERPL-SCNC: 101 MMOL/L (ref 95–110)
CHLORIDE SERPL-SCNC: 102 MMOL/L (ref 95–110)
CHLORIDE SERPL-SCNC: 103 MMOL/L (ref 95–110)
CHLORIDE SERPL-SCNC: 104 MMOL/L (ref 95–110)
CHLORIDE SERPL-SCNC: 105 MMOL/L (ref 95–110)
CHLORIDE SERPL-SCNC: 105 MMOL/L (ref 95–110)
CHLORIDE SERPL-SCNC: 106 MMOL/L (ref 95–110)
CHLORIDE SERPL-SCNC: 107 MMOL/L (ref 95–110)
CHLORIDE SERPL-SCNC: 108 MMOL/L (ref 95–110)
CHLORIDE SERPL-SCNC: 108 MMOL/L (ref 95–110)
CHLORIDE SERPL-SCNC: 93 MMOL/L (ref 95–110)
CHLORIDE SERPL-SCNC: 94 MMOL/L (ref 95–110)
CHLORIDE SERPL-SCNC: 95 MMOL/L (ref 95–110)
CHLORIDE SERPL-SCNC: 96 MMOL/L (ref 95–110)
CHLORIDE SERPL-SCNC: 97 MMOL/L (ref 95–110)
CHLORIDE SERPL-SCNC: 98 MMOL/L (ref 95–110)
CHLORIDE SERPL-SCNC: 99 MMOL/L (ref 95–110)
CHOLEST SERPL-MCNC: 99 MG/DL (ref 120–199)
CHOLEST/HDLC SERPL: 7.6 {RATIO} (ref 2–5)
CK SERPL-CCNC: 100 U/L (ref 20–200)
CK SERPL-CCNC: 1237 U/L (ref 20–200)
CK SERPL-CCNC: 1970 U/L (ref 20–200)
CK SERPL-CCNC: 209 U/L (ref 20–200)
CK SERPL-CCNC: 24 U/L (ref 20–200)
CK SERPL-CCNC: 269 U/L (ref 20–200)
CK SERPL-CCNC: 31 U/L (ref 20–200)
CK SERPL-CCNC: 378 U/L (ref 20–200)
CK SERPL-CCNC: 410 U/L (ref 20–200)
CK SERPL-CCNC: 44 U/L (ref 20–200)
CK SERPL-CCNC: 47 U/L (ref 20–200)
CK SERPL-CCNC: 50 U/L (ref 20–200)
CK SERPL-CCNC: 52 U/L
CK SERPL-CCNC: 529 U/L (ref 20–200)
CK SERPL-CCNC: 548 U/L (ref 20–200)
CK SERPL-CCNC: 64 U/L (ref 20–200)
CO2 SERPL-SCNC: 16 MMOL/L (ref 23–29)
CO2 SERPL-SCNC: 16 MMOL/L (ref 23–29)
CO2 SERPL-SCNC: 18 MMOL/L (ref 23–29)
CO2 SERPL-SCNC: 19 MMOL/L (ref 23–29)
CO2 SERPL-SCNC: 20 MMOL/L (ref 23–29)
CO2 SERPL-SCNC: 21 MMOL/L (ref 23–29)
CO2 SERPL-SCNC: 22 MMOL/L (ref 23–29)
CO2 SERPL-SCNC: 23 MMOL/L (ref 23–29)
CO2 SERPL-SCNC: 24 MMOL/L (ref 23–29)
CO2 SERPL-SCNC: 25 MMOL/L (ref 23–29)
CO2 SERPL-SCNC: 26 MMOL/L (ref 23–29)
CO2 SERPL-SCNC: 27 MMOL/L (ref 23–29)
CO2 SERPL-SCNC: 28 MMOL/L (ref 23–29)
CO2 SERPL-SCNC: 29 MMOL/L (ref 23–29)
CODING SYSTEM: NORMAL
CORTIS SERPL-MCNC: 15.1 UG/DL (ref 4.3–22.4)
CREAT SERPL-MCNC: 0.7 MG/DL (ref 0.5–1.4)
CREAT SERPL-MCNC: 0.8 MG/DL (ref 0.5–1.4)
CREAT SERPL-MCNC: 0.9 MG/DL (ref 0.5–1.4)
CREAT SERPL-MCNC: 1 MG/DL (ref 0.5–1.4)
CREAT SERPL-MCNC: 1.1 MG/DL (ref 0.5–1.4)
CREAT SERPL-MCNC: 1.2 MG/DL (ref 0.5–1.4)
CREAT SERPL-MCNC: 1.3 MG/DL (ref 0.5–1.4)
CREAT SERPL-MCNC: 1.3 MG/DL (ref 0.5–1.4)
CREAT SERPL-MCNC: 1.4 MG/DL (ref 0.5–1.4)
CREAT SERPL-MCNC: 1.4 MG/DL (ref 0.5–1.4)
CREAT SERPL-MCNC: 1.5 MG/DL (ref 0.5–1.4)
CREAT SERPL-MCNC: 1.6 MG/DL (ref 0.5–1.4)
CREAT UR-MCNC: 79 MG/DL (ref 23–375)
CREAT UR-MCNC: 79 MG/DL (ref 23–375)
CREATINE KINASE, TOTAL: 27
CREATINE KINASE, TOTAL: 617
CREATINE KINASE, TOTAL: 64
CREATINE KINASE, TOTAL: 94
CREATININE: 0.8 MG/DL (ref 0.22–1.25)
CREATININE: 1 MG/DL (ref 0.66–1.25)
CRP SERPL-MCNC: 10.1 MG/L
CRP SERPL-MCNC: 102.1 MG/L (ref 0–8.2)
CRP SERPL-MCNC: 107.4 MG/L (ref 0–8.2)
CRP SERPL-MCNC: 109.6 MG/L (ref 0–8.2)
CRP SERPL-MCNC: 117.4 MG/L (ref 0–8.2)
CRP SERPL-MCNC: 12 MG/L
CRP SERPL-MCNC: 13 MG/L (ref 0–8.2)
CRP SERPL-MCNC: 16.2 MG/L (ref 0–8.2)
CRP SERPL-MCNC: 16.2 MG/L (ref 0–8.2)
CRP SERPL-MCNC: 16.7 MG/L (ref 0–8.2)
CRP SERPL-MCNC: 16.9 MG/L (ref 0–8.2)
CRP SERPL-MCNC: 17 MG/L (ref 0–8.2)
CRP SERPL-MCNC: 19 MG/L (ref 0–8.2)
CRP SERPL-MCNC: 21.7 MG/L (ref 0–8.2)
CRP SERPL-MCNC: 25.9 MG/L (ref 0–8.2)
CRP SERPL-MCNC: 27.2 MG/L (ref 0–8.2)
CRP SERPL-MCNC: 28.4 MG/L (ref 0–8.2)
CRP SERPL-MCNC: 36.4 MG/L (ref 0–8.2)
CRP SERPL-MCNC: 38.2 MG/L (ref 0–8.2)
CRP SERPL-MCNC: 4.1 MG/L
CRP SERPL-MCNC: 40 MG/L (ref 0–8.2)
CRP SERPL-MCNC: 48.3 MG/L (ref 0–8.2)
CRP SERPL-MCNC: 49.1 MG/L (ref 0–8.2)
CRP SERPL-MCNC: 5.2 MG/L
CRP SERPL-MCNC: 56.9 MG/L (ref 0–8.2)
CRP SERPL-MCNC: 60.7 MG/L (ref 0–8.2)
CRP SERPL-MCNC: 7.1 MG/L (ref 0–8.2)
CRP SERPL-MCNC: 70.4 MG/L (ref 0–8.2)
CRP SERPL-MCNC: 75.4 MG/L (ref 0–8.2)
CRP SERPL-MCNC: 77.2 MG/L (ref 0–8.2)
CRP SERPL-MCNC: 79.3 MG/L (ref 0–8.2)
CRP SERPL-MCNC: 85.2 MG/L (ref 0–8.2)
CRP SERPL-MCNC: 87.5 MG/L (ref 0–8.2)
CRP SERPL-MCNC: 95.4 MG/L (ref 0–8.2)
CRP: 3.97 MG/DL (ref 0–0.5)
CTP QC/QA: YES
CV ECHO LV RWT: 0.22 CM
CV ECHO LV RWT: 0.23 CM
CV ECHO LV RWT: 0.3 CM
DACRYOCYTES BLD QL SMEAR: ABNORMAL
DACRYOCYTES BLD QL SMEAR: ABNORMAL
DELSYS: ABNORMAL
DIFFERENTIAL METHOD: ABNORMAL
DISPENSE STATUS: NORMAL
DOP CALC LVOT AREA: 3 CM2
DOP CALC LVOT AREA: 3.8 CM2
DOP CALC LVOT AREA: 3.8 CM2
DOP CALC LVOT DIAMETER: 1.95 CM
DOP CALC LVOT DIAMETER: 2.2 CM
DOP CALC LVOT DIAMETER: 2.21 CM
E WAVE DECELERATION TIME: 100.03 MSEC
E/A RATIO: 0.82
E/A RATIO: 1.14
E/E' RATIO: 11 M/S
ECHO LV POSTERIOR WALL: 0.67 CM (ref 0.6–1.1)
ECHO LV POSTERIOR WALL: 0.7 CM (ref 0.6–1.1)
ECHO LV POSTERIOR WALL: 0.87 CM (ref 0.6–1.1)
EJECTION FRACTION: 10 %
EJECTION FRACTION: 18 %
EJECTION FRACTION: 20 %
EJECTION FRACTION: 20 %
EOSINOPHIL # BLD AUTO: 0 K/UL (ref 0–0.5)
EOSINOPHIL # BLD AUTO: 0.1 K/UL (ref 0–0.5)
EOSINOPHIL # BLD AUTO: 0.2 K/UL (ref 0–0.5)
EOSINOPHIL # BLD AUTO: 0.3 K/UL (ref 0–0.5)
EOSINOPHIL # BLD AUTO: 0.4 K/UL (ref 0–0.5)
EOSINOPHIL # BLD AUTO: 0.5 K/UL (ref 0–0.5)
EOSINOPHIL # BLD AUTO: 0.6 K/UL (ref 0–0.5)
EOSINOPHIL # BLD AUTO: 0.7 K/UL (ref 0–0.5)
EOSINOPHIL NFR BLD: 0 % (ref 0–8)
EOSINOPHIL NFR BLD: 0 % (ref 0–8)
EOSINOPHIL NFR BLD: 0.1 % (ref 0–8)
EOSINOPHIL NFR BLD: 0.1 % (ref 0–8)
EOSINOPHIL NFR BLD: 0.3 % (ref 0–8)
EOSINOPHIL NFR BLD: 0.3 % (ref 0–8)
EOSINOPHIL NFR BLD: 0.4 % (ref 0–8)
EOSINOPHIL NFR BLD: 0.5 % (ref 0–8)
EOSINOPHIL NFR BLD: 0.7 % (ref 0–8)
EOSINOPHIL NFR BLD: 0.7 % (ref 0–8)
EOSINOPHIL NFR BLD: 0.8 % (ref 0–8)
EOSINOPHIL NFR BLD: 0.9 % (ref 0–8)
EOSINOPHIL NFR BLD: 0.9 % (ref 0–8)
EOSINOPHIL NFR BLD: 1 % (ref 0–8)
EOSINOPHIL NFR BLD: 1.1 % (ref 0–8)
EOSINOPHIL NFR BLD: 1.2 % (ref 0–8)
EOSINOPHIL NFR BLD: 1.3 % (ref 0–8)
EOSINOPHIL NFR BLD: 1.4 % (ref 0–8)
EOSINOPHIL NFR BLD: 1.4 % (ref 0–8)
EOSINOPHIL NFR BLD: 1.5 % (ref 0–8)
EOSINOPHIL NFR BLD: 1.6 % (ref 0–8)
EOSINOPHIL NFR BLD: 1.7 % (ref 0–8)
EOSINOPHIL NFR BLD: 1.8 % (ref 0–8)
EOSINOPHIL NFR BLD: 1.9 % (ref 0–8)
EOSINOPHIL NFR BLD: 1.9 % (ref 0–8)
EOSINOPHIL NFR BLD: 2 % (ref 0–8)
EOSINOPHIL NFR BLD: 2 % (ref 0–8)
EOSINOPHIL NFR BLD: 2.1 % (ref 0–8)
EOSINOPHIL NFR BLD: 2.1 % (ref 0–8)
EOSINOPHIL NFR BLD: 2.2 % (ref 0–8)
EOSINOPHIL NFR BLD: 2.3 % (ref 0–8)
EOSINOPHIL NFR BLD: 2.3 % (ref 0–8)
EOSINOPHIL NFR BLD: 2.4 % (ref 0–8)
EOSINOPHIL NFR BLD: 2.5 % (ref 0–8)
EOSINOPHIL NFR BLD: 2.6 % (ref 0–8)
EOSINOPHIL NFR BLD: 2.7 % (ref 0–8)
EOSINOPHIL NFR BLD: 2.8 % (ref 0–8)
EOSINOPHIL NFR BLD: 2.9 % (ref 0–8)
EOSINOPHIL NFR BLD: 2.9 % (ref 0–8)
EOSINOPHIL NFR BLD: 3.4 % (ref 0–8)
EOSINOPHIL NFR BLD: 3.4 % (ref 0–8)
EOSINOPHIL NFR BLD: 3.5 % (ref 0–8)
EOSINOPHIL NFR BLD: 3.6 % (ref 0–8)
EOSINOPHIL NFR BLD: 3.7 % (ref 0–8)
EOSINOPHIL NFR BLD: 4 % (ref 0–8)
EOSINOPHIL NFR BLD: 4.2 % (ref 0–8)
EOSINOPHIL NFR BLD: 4.2 % (ref 0–8)
EOSINOPHIL NFR BLD: 4.3 % (ref 0–8)
EOSINOPHIL NFR BLD: 4.3 % (ref 0–8)
EOSINOPHIL NFR BLD: 4.4 % (ref 0–8)
EOSINOPHIL NFR BLD: 4.5 % (ref 0–8)
EOSINOPHIL NFR BLD: 4.8 % (ref 0–8)
EOSINOPHIL NFR BLD: 4.9 % (ref 0–8)
EOSINOPHIL NFR BLD: 5.4 % (ref 0–8)
EOSINOPHIL NFR BLD: 6.6 % (ref 0–8)
ERYTHROCYTE [DISTWIDTH] IN BLOOD BY AUTOMATED COUNT: 17.2 % (ref 11.5–14.5)
ERYTHROCYTE [DISTWIDTH] IN BLOOD BY AUTOMATED COUNT: 17.3 % (ref 11.5–14.5)
ERYTHROCYTE [DISTWIDTH] IN BLOOD BY AUTOMATED COUNT: 17.4 % (ref 11.5–14.5)
ERYTHROCYTE [DISTWIDTH] IN BLOOD BY AUTOMATED COUNT: 17.9 % (ref 11.5–14.5)
ERYTHROCYTE [DISTWIDTH] IN BLOOD BY AUTOMATED COUNT: 18.3 % (ref 11.5–14.5)
ERYTHROCYTE [DISTWIDTH] IN BLOOD BY AUTOMATED COUNT: 18.3 % (ref 11.5–14.5)
ERYTHROCYTE [DISTWIDTH] IN BLOOD BY AUTOMATED COUNT: 18.4 % (ref 11.5–14.5)
ERYTHROCYTE [DISTWIDTH] IN BLOOD BY AUTOMATED COUNT: 18.4 % (ref 11.5–14.5)
ERYTHROCYTE [DISTWIDTH] IN BLOOD BY AUTOMATED COUNT: 18.6 % (ref 11.5–14.5)
ERYTHROCYTE [DISTWIDTH] IN BLOOD BY AUTOMATED COUNT: 18.7 % (ref 11.5–14.5)
ERYTHROCYTE [DISTWIDTH] IN BLOOD BY AUTOMATED COUNT: 18.7 % (ref 11.5–14.5)
ERYTHROCYTE [DISTWIDTH] IN BLOOD BY AUTOMATED COUNT: 18.9 % (ref 11.5–14.5)
ERYTHROCYTE [DISTWIDTH] IN BLOOD BY AUTOMATED COUNT: 19 % (ref 11.5–14.5)
ERYTHROCYTE [DISTWIDTH] IN BLOOD BY AUTOMATED COUNT: 19.1 % (ref 11.5–14.5)
ERYTHROCYTE [DISTWIDTH] IN BLOOD BY AUTOMATED COUNT: 19.3 % (ref 11.5–14.5)
ERYTHROCYTE [DISTWIDTH] IN BLOOD BY AUTOMATED COUNT: 19.4 % (ref 11.5–14.5)
ERYTHROCYTE [DISTWIDTH] IN BLOOD BY AUTOMATED COUNT: 19.5 % (ref 11.5–14.5)
ERYTHROCYTE [DISTWIDTH] IN BLOOD BY AUTOMATED COUNT: 19.6 % (ref 11.5–14.5)
ERYTHROCYTE [DISTWIDTH] IN BLOOD BY AUTOMATED COUNT: 19.6 % (ref 11.5–14.5)
ERYTHROCYTE [DISTWIDTH] IN BLOOD BY AUTOMATED COUNT: 19.7 % (ref 11.5–14.5)
ERYTHROCYTE [DISTWIDTH] IN BLOOD BY AUTOMATED COUNT: 19.7 % (ref 11.5–14.5)
ERYTHROCYTE [DISTWIDTH] IN BLOOD BY AUTOMATED COUNT: 20.1 % (ref 11.5–14.5)
ERYTHROCYTE [DISTWIDTH] IN BLOOD BY AUTOMATED COUNT: 20.2 % (ref 11.5–14.5)
ERYTHROCYTE [DISTWIDTH] IN BLOOD BY AUTOMATED COUNT: 20.3 % (ref 11.5–14.5)
ERYTHROCYTE [DISTWIDTH] IN BLOOD BY AUTOMATED COUNT: 20.4 % (ref 11.5–14.5)
ERYTHROCYTE [DISTWIDTH] IN BLOOD BY AUTOMATED COUNT: 20.4 % (ref 11.5–14.5)
ERYTHROCYTE [DISTWIDTH] IN BLOOD BY AUTOMATED COUNT: 20.6 % (ref 11.5–14.5)
ERYTHROCYTE [DISTWIDTH] IN BLOOD BY AUTOMATED COUNT: 20.6 % (ref 11.5–14.5)
ERYTHROCYTE [DISTWIDTH] IN BLOOD BY AUTOMATED COUNT: 20.8 % (ref 11.5–14.5)
ERYTHROCYTE [DISTWIDTH] IN BLOOD BY AUTOMATED COUNT: 20.8 % (ref 11.5–14.5)
ERYTHROCYTE [DISTWIDTH] IN BLOOD BY AUTOMATED COUNT: 20.9 % (ref 11.5–14.5)
ERYTHROCYTE [DISTWIDTH] IN BLOOD BY AUTOMATED COUNT: 20.9 % (ref 11.5–14.5)
ERYTHROCYTE [DISTWIDTH] IN BLOOD BY AUTOMATED COUNT: 21 % (ref 11.5–14.5)
ERYTHROCYTE [DISTWIDTH] IN BLOOD BY AUTOMATED COUNT: 21.2 % (ref 11.5–14.5)
ERYTHROCYTE [DISTWIDTH] IN BLOOD BY AUTOMATED COUNT: 21.8 % (ref 11.5–14.5)
ERYTHROCYTE [DISTWIDTH] IN BLOOD BY AUTOMATED COUNT: 21.9 % (ref 11.5–14.5)
ERYTHROCYTE [DISTWIDTH] IN BLOOD BY AUTOMATED COUNT: 22 % (ref 11.5–14.5)
ERYTHROCYTE [DISTWIDTH] IN BLOOD BY AUTOMATED COUNT: 22.1 % (ref 11.5–14.5)
ERYTHROCYTE [DISTWIDTH] IN BLOOD BY AUTOMATED COUNT: 22.4 % (ref 11.5–14.5)
ERYTHROCYTE [DISTWIDTH] IN BLOOD BY AUTOMATED COUNT: 22.5 % (ref 11.5–14.5)
ERYTHROCYTE [DISTWIDTH] IN BLOOD BY AUTOMATED COUNT: 22.6 % (ref 11.5–14.5)
ERYTHROCYTE [DISTWIDTH] IN BLOOD BY AUTOMATED COUNT: 23.2 % (ref 11.5–14.5)
ERYTHROCYTE [DISTWIDTH] IN BLOOD BY AUTOMATED COUNT: 23.3 % (ref 11.5–14.5)
ERYTHROCYTE [DISTWIDTH] IN BLOOD BY AUTOMATED COUNT: 23.5 % (ref 11.5–14.5)
ERYTHROCYTE [DISTWIDTH] IN BLOOD BY AUTOMATED COUNT: 23.5 % (ref 11.5–14.5)
ERYTHROCYTE [DISTWIDTH] IN BLOOD BY AUTOMATED COUNT: 23.7 % (ref 11.5–14.5)
ERYTHROCYTE [DISTWIDTH] IN BLOOD BY AUTOMATED COUNT: 23.9 % (ref 11.5–14.5)
ERYTHROCYTE [DISTWIDTH] IN BLOOD BY AUTOMATED COUNT: 24 % (ref 11.5–14.5)
ERYTHROCYTE [DISTWIDTH] IN BLOOD BY AUTOMATED COUNT: 24.3 % (ref 11.5–14.5)
ERYTHROCYTE [DISTWIDTH] IN BLOOD BY AUTOMATED COUNT: 24.4 % (ref 11.5–14.5)
ERYTHROCYTE [DISTWIDTH] IN BLOOD BY AUTOMATED COUNT: 24.9 % (ref 11.5–14.5)
ERYTHROCYTE [DISTWIDTH] IN BLOOD BY AUTOMATED COUNT: 24.9 % (ref 11.5–14.5)
ERYTHROCYTE [DISTWIDTH] IN BLOOD BY AUTOMATED COUNT: 25.7 % (ref 11.5–14.5)
ERYTHROCYTE [SEDIMENTATION RATE] IN BLOOD BY WESTERGREN METHOD: 108 MM/HR (ref 0–23)
ERYTHROCYTE [SEDIMENTATION RATE] IN BLOOD BY WESTERGREN METHOD: 118 MM/HR (ref 0–23)
ERYTHROCYTE [SEDIMENTATION RATE] IN BLOOD BY WESTERGREN METHOD: 68 MM/HR (ref 0–23)
EST. GFR  (AFRICAN AMERICAN): 54.9 ML/MIN/1.73 M^2
EST. GFR  (AFRICAN AMERICAN): 59.3 ML/MIN/1.73 M^2
EST. GFR  (AFRICAN AMERICAN): >60 ML/MIN/1.73 M^2
EST. GFR  (NON AFRICAN AMERICAN): 47.5 ML/MIN/1.73 M^2
EST. GFR  (NON AFRICAN AMERICAN): 51.3 ML/MIN/1.73 M^2
EST. GFR  (NON AFRICAN AMERICAN): 55.8 ML/MIN/1.73 M^2
EST. GFR  (NON AFRICAN AMERICAN): 55.8 ML/MIN/1.73 M^2
EST. GFR  (NON AFRICAN AMERICAN): >60 ML/MIN/1.73 M^2
ESTIMATED AVG GLUCOSE: 108 MG/DL (ref 68–131)
ESTIMATED AVG GLUCOSE: 114 MG/DL (ref 68–131)
EXT ALBUMIN: 3.2
EXT ALBUMIN: 3.2
EXT ALBUMIN: 3.5
EXT ALBUMIN: 3.5
EXT ALBUMIN: 3.7
EXT ALBUMIN: 3.8
EXT ALBUMIN: 3.9
EXT ALBUMIN: 4.1
EXT ALT: 10
EXT ALT: 12
EXT ALT: 26
EXT ALT: 35
EXT ALT: 9
EXT AST: 17
EXT AST: 18
EXT AST: 19
EXT AST: 19
EXT AST: 20
EXT AST: 22
EXT AST: 32
EXT AST: 34
EXT BILIRUBIN TOTAL: 0.24
EXT BUN: 11
EXT BUN: 12
EXT BUN: 19
EXT BUN: 4
EXT BUN: 6
EXT BUN: 7
EXT BUN: 7
EXT BUN: 8
EXT BUN: 8
EXT CALCIUM: 3.9
EXT CALCIUM: 4
EXT CALCIUM: 8.5
EXT CALCIUM: 8.7
EXT CALCIUM: 9
EXT CALCIUM: 9
EXT CALCIUM: 9.1
EXT CALCIUM: 9.2
EXT CALCIUM: 9.3
EXT CHLORIDE: 101
EXT CHLORIDE: 102
EXT CHLORIDE: 102
EXT CHLORIDE: 103
EXT CHLORIDE: 104
EXT CHLORIDE: 97
EXT CHLORIDE: 99
EXT CREATININE: 0.7 MG/DL
EXT CREATININE: 0.7 MG/DL
EXT CREATININE: 0.8 MG/DL
EXT CREATININE: 0.9 MG/DL
EXT CREATININE: 1 MG/DL
EXT CREATININE: 1 MG/DL
EXT CREATININE: 1.2 MG/DL
EXT CREATININE: 1.5 MG/DL
EXT GLUCOSE: 101
EXT GLUCOSE: 102
EXT GLUCOSE: 110
EXT GLUCOSE: 116
EXT GLUCOSE: 93
EXT GLUCOSE: 93
EXT GLUCOSE: 96
EXT GLUCOSE: 97
EXT GLUCOSE: 98
EXT HEMATOCRIT: 26.9
EXT HEMATOCRIT: 28
EXT HEMATOCRIT: 28
EXT HEMATOCRIT: 29.6
EXT HEMATOCRIT: 30.2
EXT HEMATOCRIT: 30.4
EXT HEMATOCRIT: 32.3
EXT HEMATOCRIT: 33.2
EXT HEMATOCRIT: 33.4
EXT HEMATOCRIT: 33.8
EXT HEMOGLOBIN: 10
EXT HEMOGLOBIN: 10.3
EXT HEMOGLOBIN: 10.5
EXT HEMOGLOBIN: 8.1
EXT HEMOGLOBIN: 8.3
EXT HEMOGLOBIN: 8.6
EXT HEMOGLOBIN: 8.6
EXT HEMOGLOBIN: 9.8
EXT PLATELETS: 314
EXT PLATELETS: 411
EXT PLATELETS: 438
EXT PLATELETS: 438
EXT PLATELETS: 454
EXT PLATELETS: 463
EXT PLATELETS: 467
EXT PLATELETS: 482
EXT PLATELETS: 483
EXT PLATELETS: 623
EXT POTASSIUM: 3.3
EXT POTASSIUM: 3.3
EXT POTASSIUM: 3.8
EXT POTASSIUM: 3.8
EXT POTASSIUM: 4
EXT POTASSIUM: 4.1
EXT POTASSIUM: 4.2
EXT POTASSIUM: 4.2
EXT POTASSIUM: 4.5
EXT POTASSIUM: 4.6
EXT POTASSIUM: 4.7
EXT PROTEIN TOTAL: 6.7
EXT PROTEIN TOTAL: 6.7
EXT PROTEIN TOTAL: 7.1
EXT PROTEIN TOTAL: 7.2
EXT PROTEIN TOTAL: 7.5
EXT PROTEIN TOTAL: 7.6
EXT PROTEIN TOTAL: 7.6
EXT PROTEIN TOTAL: 8.2
EXT SODIUM: 132 MMOL/L
EXT SODIUM: 134 MMOL/L
EXT SODIUM: 137 MMOL/L
EXT SODIUM: 137 MMOL/L
EXT SODIUM: 138 MMOL/L
EXT SODIUM: 138 MMOL/L
EXT SODIUM: 139 MMOL/L
EXT SODIUM: 141 MMOL/L
EXT SODIUM: 142 MMOL/L
EXT WBC: 11.8
EXT WBC: 12.4
EXT WBC: 13.1
EXT WBC: 17.1
EXT WBC: 7.3
EXT WBC: 7.7
EXT WBC: 7.9
EXT WBC: 8.3
EXT WBC: 8.3
EXT WBC: 8.8
FERRITIN SERPL-MCNC: 292 NG/ML (ref 20–300)
FRACTIONAL SHORTENING: 11 % (ref 28–44)
FRACTIONAL SHORTENING: 14 % (ref 28–44)
FRACTIONAL SHORTENING: 9 % (ref 28–44)
GIANT PLATELETS BLD QL SMEAR: PRESENT
GLUCOSE SERPL-MCNC: 100 MG/DL (ref 70–110)
GLUCOSE SERPL-MCNC: 100 MG/DL (ref 70–110)
GLUCOSE SERPL-MCNC: 101 MG/DL (ref 70–110)
GLUCOSE SERPL-MCNC: 101 MG/DL (ref 70–110)
GLUCOSE SERPL-MCNC: 102 MG/DL (ref 70–110)
GLUCOSE SERPL-MCNC: 103 MG/DL (ref 70–110)
GLUCOSE SERPL-MCNC: 105 MG/DL (ref 70–110)
GLUCOSE SERPL-MCNC: 106 MG/DL (ref 70–110)
GLUCOSE SERPL-MCNC: 107 MG/DL (ref 70–110)
GLUCOSE SERPL-MCNC: 108 MG/DL (ref 70–110)
GLUCOSE SERPL-MCNC: 109 MG/DL (ref 70–110)
GLUCOSE SERPL-MCNC: 110 MG/DL (ref 70–110)
GLUCOSE SERPL-MCNC: 110 MG/DL (ref 70–110)
GLUCOSE SERPL-MCNC: 111 MG/DL (ref 70–110)
GLUCOSE SERPL-MCNC: 111 MG/DL (ref 70–110)
GLUCOSE SERPL-MCNC: 112 MG/DL (ref 70–110)
GLUCOSE SERPL-MCNC: 112 MG/DL (ref 70–110)
GLUCOSE SERPL-MCNC: 113 MG/DL (ref 70–110)
GLUCOSE SERPL-MCNC: 114 MG/DL (ref 70–110)
GLUCOSE SERPL-MCNC: 115 MG/DL (ref 70–110)
GLUCOSE SERPL-MCNC: 115 MG/DL (ref 70–110)
GLUCOSE SERPL-MCNC: 116 MG/DL (ref 70–110)
GLUCOSE SERPL-MCNC: 117 MG/DL (ref 70–110)
GLUCOSE SERPL-MCNC: 118 MG/DL (ref 70–110)
GLUCOSE SERPL-MCNC: 119 MG/DL (ref 70–110)
GLUCOSE SERPL-MCNC: 121 MG/DL (ref 70–110)
GLUCOSE SERPL-MCNC: 123 MG/DL (ref 70–110)
GLUCOSE SERPL-MCNC: 125 MG/DL (ref 70–110)
GLUCOSE SERPL-MCNC: 128 MG/DL (ref 70–110)
GLUCOSE SERPL-MCNC: 131 MG/DL (ref 70–110)
GLUCOSE SERPL-MCNC: 135 MG/DL (ref 70–110)
GLUCOSE SERPL-MCNC: 143 MG/DL (ref 70–110)
GLUCOSE SERPL-MCNC: 144 MG/DL (ref 70–110)
GLUCOSE SERPL-MCNC: 145 MG/DL (ref 70–110)
GLUCOSE SERPL-MCNC: 164 MG/DL (ref 70–110)
GLUCOSE SERPL-MCNC: 73 MG/DL (ref 70–110)
GLUCOSE SERPL-MCNC: 76 MG/DL (ref 70–110)
GLUCOSE SERPL-MCNC: 79 MG/DL (ref 70–110)
GLUCOSE SERPL-MCNC: 79 MG/DL (ref 70–110)
GLUCOSE SERPL-MCNC: 83 MG/DL (ref 70–110)
GLUCOSE SERPL-MCNC: 85 MG/DL (ref 70–110)
GLUCOSE SERPL-MCNC: 86 MG/DL (ref 70–110)
GLUCOSE SERPL-MCNC: 89 MG/DL (ref 70–110)
GLUCOSE SERPL-MCNC: 91 MG/DL (ref 70–110)
GLUCOSE SERPL-MCNC: 91 MG/DL (ref 70–110)
GLUCOSE SERPL-MCNC: 92 MG/DL (ref 70–110)
GLUCOSE SERPL-MCNC: 93 MG/DL (ref 70–110)
GLUCOSE SERPL-MCNC: 95 MG/DL (ref 70–110)
GLUCOSE SERPL-MCNC: 96 MG/DL (ref 70–110)
GLUCOSE SERPL-MCNC: 97 MG/DL (ref 70–110)
GLUCOSE SERPL-MCNC: 98 MG/DL (ref 70–110)
GLUCOSE SERPL-MCNC: 99 MG/DL (ref 70–110)
GLUCOSE: 111 MG/DL (ref 74–100)
GLUCOSE: 98 MG/DL (ref 74–100)
GRAM STN SPEC: NORMAL
GRAM STN SPEC: NORMAL
HBA1C MFR BLD: 5.4 % (ref 4–5.6)
HBA1C MFR BLD: 5.6 % (ref 4–5.6)
HCO3 UR-SCNC: 25.9 MMOL/L (ref 24–28)
HCO3 UR-SCNC: 28.8 MMOL/L (ref 24–28)
HCO3 UR-SCNC: 33.7 MMOL/L (ref 24–28)
HCO3 UR-SCNC: 34.8 MMOL/L (ref 24–28)
HCT VFR BLD AUTO: 24.5 % (ref 40–54)
HCT VFR BLD AUTO: 25.6 % (ref 40–54)
HCT VFR BLD AUTO: 25.7 % (ref 40–54)
HCT VFR BLD AUTO: 25.9 % (ref 40–54)
HCT VFR BLD AUTO: 25.9 % (ref 40–54)
HCT VFR BLD AUTO: 26.1 % (ref 40–54)
HCT VFR BLD AUTO: 26.2 % (ref 40–54)
HCT VFR BLD AUTO: 26.2 % (ref 40–54)
HCT VFR BLD AUTO: 26.4 % (ref 40–54)
HCT VFR BLD AUTO: 26.4 % (ref 40–54)
HCT VFR BLD AUTO: 26.6 % (ref 40–54)
HCT VFR BLD AUTO: 26.7 % (ref 40–54)
HCT VFR BLD AUTO: 26.9 % (ref 40–54)
HCT VFR BLD AUTO: 27.1 % (ref 40–54)
HCT VFR BLD AUTO: 27.4 % (ref 40–54)
HCT VFR BLD AUTO: 27.4 % (ref 40–54)
HCT VFR BLD AUTO: 27.6 % (ref 40–54)
HCT VFR BLD AUTO: 27.9 % (ref 40–54)
HCT VFR BLD AUTO: 28.3 % (ref 40–54)
HCT VFR BLD AUTO: 28.6 % (ref 40–54)
HCT VFR BLD AUTO: 28.7 % (ref 40–54)
HCT VFR BLD AUTO: 28.8 % (ref 40–54)
HCT VFR BLD AUTO: 29.1 % (ref 40–54)
HCT VFR BLD AUTO: 29.1 % (ref 40–54)
HCT VFR BLD AUTO: 29.4 % (ref 40–54)
HCT VFR BLD AUTO: 29.6 % (ref 40–54)
HCT VFR BLD AUTO: 29.8 % (ref 40–54)
HCT VFR BLD AUTO: 30.3 % (ref 40–54)
HCT VFR BLD AUTO: 30.4 % (ref 40–54)
HCT VFR BLD AUTO: 30.5 % (ref 40–54)
HCT VFR BLD AUTO: 30.7 % (ref 40–54)
HCT VFR BLD AUTO: 30.9 % (ref 40–54)
HCT VFR BLD AUTO: 31 % (ref 40–54)
HCT VFR BLD AUTO: 31.1 % (ref 40–54)
HCT VFR BLD AUTO: 31.3 % (ref 40–54)
HCT VFR BLD AUTO: 31.4 % (ref 40–54)
HCT VFR BLD AUTO: 31.6 % (ref 40–54)
HCT VFR BLD AUTO: 31.6 % (ref 40–54)
HCT VFR BLD AUTO: 32 % (ref 40–54)
HCT VFR BLD AUTO: 32 % (ref 40–54)
HCT VFR BLD AUTO: 32.2 % (ref 40–54)
HCT VFR BLD AUTO: 32.3 % (ref 40–54)
HCT VFR BLD AUTO: 32.4 % (ref 40–54)
HCT VFR BLD AUTO: 32.8 % (ref 40–54)
HCT VFR BLD AUTO: 32.9 % (ref 40–54)
HCT VFR BLD AUTO: 33.1 % (ref 40–54)
HCT VFR BLD AUTO: 33.2 % (ref 40–54)
HCT VFR BLD AUTO: 33.3 % (ref 40–54)
HCT VFR BLD AUTO: 33.4 % (ref 40–54)
HCT VFR BLD AUTO: 33.4 % (ref 40–54)
HCT VFR BLD AUTO: 33.6 % (ref 40–54)
HCT VFR BLD AUTO: 33.6 % (ref 40–54)
HCT VFR BLD AUTO: 33.7 % (ref 40–54)
HCT VFR BLD AUTO: 33.9 % (ref 40–54)
HCT VFR BLD AUTO: 34 % (ref 40–54)
HCT VFR BLD AUTO: 34 % (ref 40–54)
HCT VFR BLD AUTO: 34.1 % (ref 40–54)
HCT VFR BLD AUTO: 34.3 % (ref 40–54)
HCT VFR BLD AUTO: 34.4 % (ref 40–54)
HCT VFR BLD AUTO: 34.5 % (ref 40–54)
HCT VFR BLD AUTO: 34.6 % (ref 40–54)
HCT VFR BLD AUTO: 35.1 % (ref 40–54)
HCT VFR BLD AUTO: 35.4 % (ref 40–54)
HCT VFR BLD AUTO: 35.5 % (ref 40–54)
HCT VFR BLD AUTO: 35.9 % (ref 40–54)
HCT VFR BLD AUTO: 36.2 % (ref 40–54)
HCT VFR BLD AUTO: 36.4 % (ref 40–54)
HCT VFR BLD AUTO: 37.1 % (ref 40–54)
HCT VFR BLD AUTO: 37.2 % (ref 40–54)
HCT VFR BLD AUTO: 42.2 % (ref 40–54)
HCT VFR BLD AUTO: 43.1 % (ref 40–54)
HCT VFR BLD CALC: 31 %PCV (ref 36–54)
HCT VFR BLD CALC: 36 %PCV (ref 36–54)
HCT VFR BLD CALC: 37 %PCV (ref 36–54)
HCV AB SERPL QL IA: NEGATIVE
HDLC SERPL-MCNC: 13 MG/DL (ref 40–75)
HDLC SERPL: 13.1 % (ref 20–50)
HEMATOCRIT BLOOD: 30.6 % (ref 42–52)
HEMATOCRIT BLOOD: 32.5 % (ref 42–52)
HGB BLD-MCNC: 10 G/DL (ref 14–18)
HGB BLD-MCNC: 10 G/DL (ref 14–18)
HGB BLD-MCNC: 10.1 G/DL (ref 14–18)
HGB BLD-MCNC: 10.1 G/DL (ref 14–18)
HGB BLD-MCNC: 10.2 G/DL (ref 14–18)
HGB BLD-MCNC: 10.3 G/DL (ref 14–18)
HGB BLD-MCNC: 10.4 G/DL (ref 14–18)
HGB BLD-MCNC: 10.5 G/DL (ref 14–18)
HGB BLD-MCNC: 10.6 G/DL (ref 14–18)
HGB BLD-MCNC: 10.7 G/DL (ref 14–18)
HGB BLD-MCNC: 10.9 G/DL (ref 14–18)
HGB BLD-MCNC: 11 G/DL (ref 14–18)
HGB BLD-MCNC: 11.1 G/DL (ref 14–18)
HGB BLD-MCNC: 11.2 G/DL (ref 14–18)
HGB BLD-MCNC: 11.3 G/DL (ref 14–18)
HGB BLD-MCNC: 11.4 G/DL (ref 14–18)
HGB BLD-MCNC: 11.4 G/DL (ref 14–18)
HGB BLD-MCNC: 11.5 G/DL (ref 14–18)
HGB BLD-MCNC: 11.6 G/DL (ref 14–18)
HGB BLD-MCNC: 11.6 G/DL (ref 14–18)
HGB BLD-MCNC: 13 G/DL (ref 14–18)
HGB BLD-MCNC: 13.1 G/DL (ref 14–18)
HGB BLD-MCNC: 7.6 G/DL (ref 14–18)
HGB BLD-MCNC: 7.6 G/DL (ref 14–18)
HGB BLD-MCNC: 7.7 G/DL (ref 14–18)
HGB BLD-MCNC: 7.7 G/DL (ref 14–18)
HGB BLD-MCNC: 7.8 G/DL (ref 14–18)
HGB BLD-MCNC: 7.8 G/DL (ref 14–18)
HGB BLD-MCNC: 7.9 G/DL (ref 14–18)
HGB BLD-MCNC: 8 G/DL (ref 14–18)
HGB BLD-MCNC: 8 G/DL (ref 14–18)
HGB BLD-MCNC: 8.1 G/DL (ref 14–18)
HGB BLD-MCNC: 8.2 G/DL (ref 14–18)
HGB BLD-MCNC: 8.2 G/DL (ref 14–18)
HGB BLD-MCNC: 8.3 G/DL (ref 14–18)
HGB BLD-MCNC: 8.4 G/DL (ref 14–18)
HGB BLD-MCNC: 8.4 G/DL (ref 14–18)
HGB BLD-MCNC: 8.5 G/DL (ref 14–18)
HGB BLD-MCNC: 8.6 G/DL (ref 14–18)
HGB BLD-MCNC: 8.7 G/DL (ref 14–18)
HGB BLD-MCNC: 8.7 G/DL (ref 14–18)
HGB BLD-MCNC: 8.9 G/DL (ref 14–18)
HGB BLD-MCNC: 9 G/DL (ref 14–18)
HGB BLD-MCNC: 9 G/DL (ref 14–18)
HGB BLD-MCNC: 9.1 G/DL (ref 14–18)
HGB BLD-MCNC: 9.2 G/DL (ref 14–18)
HGB BLD-MCNC: 9.2 G/DL (ref 14–18)
HGB BLD-MCNC: 9.4 G/DL (ref 14–18)
HGB BLD-MCNC: 9.5 G/DL (ref 14–18)
HGB BLD-MCNC: 9.5 G/DL (ref 14–18)
HGB BLD-MCNC: 9.6 G/DL (ref 14–18)
HGB BLD-MCNC: 9.7 G/DL (ref 14–18)
HGB BLD-MCNC: 9.7 G/DL (ref 14–18)
HGB BLD-MCNC: 9.8 G/DL (ref 14–18)
HGB BLD-MCNC: 9.9 G/DL (ref 14–18)
HIV 1+2 AB+HIV1 P24 AG SERPL QL IA: NEGATIVE
HR MV ECHO: 101 BPM
HYPOCHROMIA BLD QL SMEAR: ABNORMAL
IMM GRANULOCYTES # BLD AUTO: 0.02 K/UL (ref 0–0.04)
IMM GRANULOCYTES # BLD AUTO: 0.03 K/UL (ref 0–0.04)
IMM GRANULOCYTES # BLD AUTO: 0.04 K/UL (ref 0–0.04)
IMM GRANULOCYTES # BLD AUTO: 0.05 K/UL (ref 0–0.04)
IMM GRANULOCYTES # BLD AUTO: 0.06 K/UL (ref 0–0.04)
IMM GRANULOCYTES # BLD AUTO: 0.07 K/UL (ref 0–0.04)
IMM GRANULOCYTES # BLD AUTO: 0.08 K/UL (ref 0–0.04)
IMM GRANULOCYTES # BLD AUTO: 0.09 K/UL (ref 0–0.04)
IMM GRANULOCYTES # BLD AUTO: 0.1 K/UL (ref 0–0.04)
IMM GRANULOCYTES # BLD AUTO: 0.11 K/UL (ref 0–0.04)
IMM GRANULOCYTES # BLD AUTO: 0.11 K/UL (ref 0–0.04)
IMM GRANULOCYTES # BLD AUTO: 0.12 K/UL (ref 0–0.04)
IMM GRANULOCYTES # BLD AUTO: 0.12 K/UL (ref 0–0.04)
IMM GRANULOCYTES # BLD AUTO: 0.13 K/UL (ref 0–0.04)
IMM GRANULOCYTES # BLD AUTO: 0.13 K/UL (ref 0–0.04)
IMM GRANULOCYTES # BLD AUTO: 0.14 K/UL (ref 0–0.04)
IMM GRANULOCYTES # BLD AUTO: 0.14 K/UL (ref 0–0.04)
IMM GRANULOCYTES # BLD AUTO: 0.15 K/UL (ref 0–0.04)
IMM GRANULOCYTES # BLD AUTO: 0.17 K/UL (ref 0–0.04)
IMM GRANULOCYTES # BLD AUTO: 0.18 K/UL (ref 0–0.04)
IMM GRANULOCYTES # BLD AUTO: 0.19 K/UL (ref 0–0.04)
IMM GRANULOCYTES # BLD AUTO: 0.2 K/UL (ref 0–0.04)
IMM GRANULOCYTES # BLD AUTO: 0.21 K/UL (ref 0–0.04)
IMM GRANULOCYTES # BLD AUTO: 0.27 K/UL (ref 0–0.04)
IMM GRANULOCYTES NFR BLD AUTO: 0.2 % (ref 0–0.5)
IMM GRANULOCYTES NFR BLD AUTO: 0.3 % (ref 0–0.5)
IMM GRANULOCYTES NFR BLD AUTO: 0.4 % (ref 0–0.5)
IMM GRANULOCYTES NFR BLD AUTO: 0.5 % (ref 0–0.5)
IMM GRANULOCYTES NFR BLD AUTO: 0.6 % (ref 0–0.5)
IMM GRANULOCYTES NFR BLD AUTO: 0.7 % (ref 0–0.5)
IMM GRANULOCYTES NFR BLD AUTO: 0.8 % (ref 0–0.5)
IMM GRANULOCYTES NFR BLD AUTO: 0.9 % (ref 0–0.5)
IMM GRANULOCYTES NFR BLD AUTO: 1 % (ref 0–0.5)
IMM GRANULOCYTES NFR BLD AUTO: 1.1 % (ref 0–0.5)
IMM GRANULOCYTES NFR BLD AUTO: 1.1 % (ref 0–0.5)
IMM GRANULOCYTES NFR BLD AUTO: 1.2 % (ref 0–0.5)
IMM GRANULOCYTES NFR BLD AUTO: 1.3 % (ref 0–0.5)
IMM GRANULOCYTES NFR BLD AUTO: 1.3 % (ref 0–0.5)
IMM GRANULOCYTES NFR BLD AUTO: 1.9 % (ref 0–0.5)
INR PPP: 1 (ref 0.8–1.2)
INR PPP: 1.1
INR PPP: 1.1
INR PPP: 1.1 (ref 0.8–1.2)
INR PPP: 1.16
INR PPP: 1.16
INR PPP: 1.18
INR PPP: 1.18
INR PPP: 1.2
INR PPP: 1.2
INR PPP: 1.2 (ref 0.8–1.2)
INR PPP: 1.24
INR PPP: 1.28
INR PPP: 1.29
INR PPP: 1.29
INR PPP: 1.3 (ref 0.8–1.2)
INR PPP: 1.4
INR PPP: 1.4 (ref 0.8–1.2)
INR PPP: 1.41
INR PPP: 1.42
INR PPP: 1.5
INR PPP: 1.5 (ref 0.8–1.2)
INR PPP: 1.6
INR PPP: 1.6 (ref 0.8–1.2)
INR PPP: 1.68
INR PPP: 1.7 (ref 0.8–1.2)
INR PPP: 1.8
INR PPP: 1.8 (ref 0.8–1.2)
INR PPP: 1.84
INR PPP: 1.93
INR PPP: 1.93
INR PPP: 2
INR PPP: 2
INR PPP: 2 (ref 0.8–1.2)
INR PPP: 2.08
INR PPP: 2.09
INR PPP: 2.1
INR PPP: 2.15
INR PPP: 2.18
INR PPP: 2.22
INR PPP: 2.23
INR PPP: 2.24
INR PPP: 2.3
INR PPP: 2.3 (ref 0.8–1.2)
INR PPP: 2.3 (ref 0.8–1.2)
INR PPP: 2.36
INR PPP: 2.4 (ref 0.8–1.2)
INR PPP: 2.43
INR PPP: 2.46
INR PPP: 2.5 (ref 0.8–1.2)
INR PPP: 2.5 (ref 0.8–1.2)
INR PPP: 2.6 (ref 0.8–1.2)
INR PPP: 2.7 (ref 0.8–1.2)
INR PPP: 2.8
INR PPP: 2.8 (ref 0.8–1.2)
INR PPP: 2.82
INR PPP: 2.9 (ref 0.8–1.2)
INR PPP: 2.9 (ref 0.8–1.2)
INR PPP: 2.99
INR PPP: 3.09
INR PPP: 3.1 (ref 0.8–1.2)
INR PPP: 3.1 (ref 0.8–1.2)
INR PPP: 3.4 (ref 0.8–1.2)
INR PPP: 3.6
INR PPP: 3.72
INR PPP: 3.8
INR PPP: 3.9
INTERVENTRICULAR SEPTUM: 0.51 CM (ref 0.6–1.1)
INTERVENTRICULAR SEPTUM: 0.74 CM (ref 0.6–1.1)
INTERVENTRICULAR SEPTUM: 0.96 CM (ref 0.6–1.1)
IRON SERPL-MCNC: 32 UG/DL (ref 45–160)
IVRT: 108.47 MSEC
LA MAJOR: 4.75 CM
LA MAJOR: 5.63 CM
LA MINOR: 4.83 CM
LA MINOR: 5.7 CM
LA WIDTH: 3.47 CM
LA WIDTH: 4.31 CM
LACTATE SERPL-SCNC: 0.9 MMOL/L (ref 0.5–2.2)
LACTATE SERPL-SCNC: 1.4 MMOL/L (ref 0.5–2.2)
LACTATE SERPL-SCNC: 2.1 MMOL/L (ref 0.5–2.2)
LACTATE SERPL-SCNC: 2.5 MMOL/L (ref 0.5–2.2)
LDH SERPL L TO P-CCNC: 135 U/L (ref 110–260)
LDH SERPL L TO P-CCNC: 142 U/L (ref 110–260)
LDH SERPL L TO P-CCNC: 147 U/L (ref 110–260)
LDH SERPL L TO P-CCNC: 158 U/L (ref 110–260)
LDH SERPL L TO P-CCNC: 158 U/L (ref 110–260)
LDH SERPL L TO P-CCNC: 163 U/L (ref 110–260)
LDH SERPL L TO P-CCNC: 164 U/L (ref 110–260)
LDH SERPL L TO P-CCNC: 165 U/L (ref 110–260)
LDH SERPL L TO P-CCNC: 169 U/L (ref 110–260)
LDH SERPL L TO P-CCNC: 169 U/L (ref 110–260)
LDH SERPL L TO P-CCNC: 170 U/L (ref 110–260)
LDH SERPL L TO P-CCNC: 171 U/L (ref 110–260)
LDH SERPL L TO P-CCNC: 172 U/L (ref 110–260)
LDH SERPL L TO P-CCNC: 175 U/L (ref 110–260)
LDH SERPL L TO P-CCNC: 180 U/L (ref 110–260)
LDH SERPL L TO P-CCNC: 180 U/L (ref 110–260)
LDH SERPL L TO P-CCNC: 184 U/L (ref 110–260)
LDH SERPL L TO P-CCNC: 186 U/L (ref 110–260)
LDH SERPL L TO P-CCNC: 187 U/L (ref 110–260)
LDH SERPL L TO P-CCNC: 188 U/L (ref 110–260)
LDH SERPL L TO P-CCNC: 188 U/L (ref 110–260)
LDH SERPL L TO P-CCNC: 189 U/L (ref 110–260)
LDH SERPL L TO P-CCNC: 189 U/L (ref 110–260)
LDH SERPL L TO P-CCNC: 191 U/L (ref 110–260)
LDH SERPL L TO P-CCNC: 192 U/L (ref 110–260)
LDH SERPL L TO P-CCNC: 193 U/L (ref 110–260)
LDH SERPL L TO P-CCNC: 193 U/L (ref 110–260)
LDH SERPL L TO P-CCNC: 194 U/L (ref 110–260)
LDH SERPL L TO P-CCNC: 198 U/L (ref 110–260)
LDH SERPL L TO P-CCNC: 199 U/L (ref 110–260)
LDH SERPL L TO P-CCNC: 199 U/L (ref 110–260)
LDH SERPL L TO P-CCNC: 201 U/L (ref 110–260)
LDH SERPL L TO P-CCNC: 201 U/L (ref 110–260)
LDH SERPL L TO P-CCNC: 202 U/L (ref 110–260)
LDH SERPL L TO P-CCNC: 205 U/L (ref 110–260)
LDH SERPL L TO P-CCNC: 206 U/L (ref 110–260)
LDH SERPL L TO P-CCNC: 209 U/L (ref 110–260)
LDH SERPL L TO P-CCNC: 210 U/L (ref 110–260)
LDH SERPL L TO P-CCNC: 215 U/L (ref 110–260)
LDH SERPL L TO P-CCNC: 216 U/L (ref 110–260)
LDH SERPL L TO P-CCNC: 220 U/L (ref 110–260)
LDH SERPL L TO P-CCNC: 220 U/L (ref 110–260)
LDH SERPL L TO P-CCNC: 228 U/L (ref 110–260)
LDH SERPL L TO P-CCNC: 228 U/L (ref 110–260)
LDH SERPL L TO P-CCNC: 229 U/L (ref 110–260)
LDH SERPL L TO P-CCNC: 233 U/L (ref 110–260)
LDH SERPL L TO P-CCNC: 238 U/L (ref 110–260)
LDH SERPL L TO P-CCNC: 239 U/L (ref 110–260)
LDH SERPL L TO P-CCNC: 241 U/L (ref 110–260)
LDH SERPL L TO P-CCNC: 242 U/L (ref 110–260)
LDH SERPL L TO P-CCNC: 264 U/L (ref 110–260)
LDH SERPL L TO P-CCNC: 266 U/L (ref 110–260)
LDH SERPL L TO P-CCNC: 269 U/L (ref 110–260)
LDH SERPL L TO P-CCNC: 269 U/L (ref 110–260)
LDH SERPL L TO P-CCNC: 271 U/L (ref 110–260)
LDH SERPL L TO P-CCNC: 273 U/L (ref 110–260)
LDH SERPL L TO P-CCNC: 282 U/L (ref 110–260)
LDH SERPL L TO P-CCNC: 287 U/L (ref 110–260)
LDH SERPL L TO P-CCNC: 297 U/L (ref 110–260)
LDH SERPL L TO P-CCNC: 298 U/L (ref 110–260)
LDH SERPL L TO P-CCNC: 310 U/L (ref 110–260)
LDH SERPL L TO P-CCNC: 320 U/L (ref 110–260)
LDH SERPL L TO P-CCNC: 344 U/L (ref 110–260)
LDH SERPL L TO P-CCNC: 379 U/L (ref 110–260)
LDH SERPL L TO P-CCNC: 428 U/L (ref 110–260)
LDH SERPL L TO P-CCNC: 549 U/L (ref 110–260)
LDLC SERPL CALC-MCNC: 53.4 MG/DL (ref 63–159)
LEFT ATRIUM SIZE: 3.91 CM
LEFT ATRIUM SIZE: 4.04 CM
LEFT ATRIUM SIZE: 4.61 CM
LEFT ATRIUM VOLUME INDEX MOD: 22.1 ML/M2
LEFT ATRIUM VOLUME INDEX MOD: 39.1 ML/M2
LEFT ATRIUM VOLUME INDEX: 26.8 ML/M2
LEFT ATRIUM VOLUME INDEX: 40.5 ML/M2
LEFT ATRIUM VOLUME MOD: 45.73 CM3
LEFT ATRIUM VOLUME MOD: 79 CM3
LEFT ATRIUM VOLUME: 55.24 CM3
LEFT ATRIUM VOLUME: 83.84 CM3
LEFT INTERNAL DIMENSION IN SYSTOLE: 5.1 CM (ref 2.1–4)
LEFT INTERNAL DIMENSION IN SYSTOLE: 5.24 CM (ref 2.1–4)
LEFT INTERNAL DIMENSION IN SYSTOLE: 5.72 CM (ref 2.1–4)
LEFT VENTRICLE DIASTOLIC VOLUME INDEX: 104.37 ML/M2
LEFT VENTRICLE DIASTOLIC VOLUME INDEX: 78.76 ML/M2
LEFT VENTRICLE DIASTOLIC VOLUME INDEX: 82.08 ML/M2
LEFT VENTRICLE DIASTOLIC VOLUME: 162.24 ML
LEFT VENTRICLE DIASTOLIC VOLUME: 169.91 ML
LEFT VENTRICLE DIASTOLIC VOLUME: 210.83 ML
LEFT VENTRICLE MASS INDEX: 60 G/M2
LEFT VENTRICLE MASS INDEX: 92 G/M2
LEFT VENTRICLE MASS INDEX: 99 G/M2
LEFT VENTRICLE SYSTOLIC VOLUME INDEX: 59.8 ML/M2
LEFT VENTRICLE SYSTOLIC VOLUME INDEX: 64 ML/M2
LEFT VENTRICLE SYSTOLIC VOLUME INDEX: 79.9 ML/M2
LEFT VENTRICLE SYSTOLIC VOLUME: 123.87 ML
LEFT VENTRICLE SYSTOLIC VOLUME: 131.81 ML
LEFT VENTRICLE SYSTOLIC VOLUME: 161.46 ML
LEFT VENTRICULAR INTERNAL DIMENSION IN DIASTOLE: 5.73 CM (ref 3.5–6)
LEFT VENTRICULAR INTERNAL DIMENSION IN DIASTOLE: 5.9 CM (ref 3.5–6)
LEFT VENTRICULAR INTERNAL DIMENSION IN DIASTOLE: 6.43 CM (ref 3.5–6)
LEFT VENTRICULAR MASS: 125 G
LEFT VENTRICULAR MASS: 184.97 G
LEFT VENTRICULAR MASS: 203.57 G
LV LATERAL E/E' RATIO: 11 M/S
LV SEPTAL E/E' RATIO: 11 M/S
LYMPHOCYTES # BLD AUTO: 0.5 K/UL (ref 1–4.8)
LYMPHOCYTES # BLD AUTO: 0.9 K/UL (ref 1–4.8)
LYMPHOCYTES # BLD AUTO: 1 K/UL (ref 1–4.8)
LYMPHOCYTES # BLD AUTO: 1.1 K/UL (ref 1–4.8)
LYMPHOCYTES # BLD AUTO: 1.2 K/UL (ref 1–4.8)
LYMPHOCYTES # BLD AUTO: 1.3 K/UL (ref 1–4.8)
LYMPHOCYTES # BLD AUTO: 1.4 K/UL (ref 1–4.8)
LYMPHOCYTES # BLD AUTO: 1.5 K/UL (ref 1–4.8)
LYMPHOCYTES # BLD AUTO: 1.6 K/UL (ref 1–4.8)
LYMPHOCYTES # BLD AUTO: 1.7 K/UL (ref 1–4.8)
LYMPHOCYTES # BLD AUTO: 1.8 K/UL (ref 1–4.8)
LYMPHOCYTES # BLD AUTO: 1.9 K/UL (ref 1–4.8)
LYMPHOCYTES # BLD AUTO: 2 K/UL (ref 1–4.8)
LYMPHOCYTES # BLD AUTO: 2 K/UL (ref 1–4.8)
LYMPHOCYTES # BLD AUTO: 2.2 K/UL (ref 1–4.8)
LYMPHOCYTES # BLD AUTO: 2.2 K/UL (ref 1–4.8)
LYMPHOCYTES NFR BLD: 10 % (ref 18–48)
LYMPHOCYTES NFR BLD: 10.2 % (ref 18–48)
LYMPHOCYTES NFR BLD: 10.4 % (ref 18–48)
LYMPHOCYTES NFR BLD: 10.6 % (ref 18–48)
LYMPHOCYTES NFR BLD: 10.7 % (ref 18–48)
LYMPHOCYTES NFR BLD: 10.8 % (ref 18–48)
LYMPHOCYTES NFR BLD: 10.8 % (ref 18–48)
LYMPHOCYTES NFR BLD: 11.3 % (ref 18–48)
LYMPHOCYTES NFR BLD: 11.5 % (ref 18–48)
LYMPHOCYTES NFR BLD: 12.1 % (ref 18–48)
LYMPHOCYTES NFR BLD: 12.3 % (ref 18–48)
LYMPHOCYTES NFR BLD: 12.4 % (ref 18–48)
LYMPHOCYTES NFR BLD: 12.5 % (ref 18–48)
LYMPHOCYTES NFR BLD: 12.6 % (ref 18–48)
LYMPHOCYTES NFR BLD: 12.8 % (ref 18–48)
LYMPHOCYTES NFR BLD: 12.9 % (ref 18–48)
LYMPHOCYTES NFR BLD: 13 % (ref 18–48)
LYMPHOCYTES NFR BLD: 13.1 % (ref 18–48)
LYMPHOCYTES NFR BLD: 13.6 % (ref 18–48)
LYMPHOCYTES NFR BLD: 13.6 % (ref 18–48)
LYMPHOCYTES NFR BLD: 13.7 % (ref 18–48)
LYMPHOCYTES NFR BLD: 14 % (ref 18–48)
LYMPHOCYTES NFR BLD: 14.1 % (ref 18–48)
LYMPHOCYTES NFR BLD: 14.9 % (ref 18–48)
LYMPHOCYTES NFR BLD: 15.1 % (ref 18–48)
LYMPHOCYTES NFR BLD: 15.2 % (ref 18–48)
LYMPHOCYTES NFR BLD: 15.3 % (ref 18–48)
LYMPHOCYTES NFR BLD: 15.5 % (ref 18–48)
LYMPHOCYTES NFR BLD: 15.6 % (ref 18–48)
LYMPHOCYTES NFR BLD: 15.8 % (ref 18–48)
LYMPHOCYTES NFR BLD: 16.5 % (ref 18–48)
LYMPHOCYTES NFR BLD: 16.5 % (ref 18–48)
LYMPHOCYTES NFR BLD: 16.7 % (ref 18–48)
LYMPHOCYTES NFR BLD: 16.8 % (ref 18–48)
LYMPHOCYTES NFR BLD: 17.1 % (ref 18–48)
LYMPHOCYTES NFR BLD: 17.5 % (ref 18–48)
LYMPHOCYTES NFR BLD: 17.5 % (ref 18–48)
LYMPHOCYTES NFR BLD: 17.8 % (ref 18–48)
LYMPHOCYTES NFR BLD: 17.8 % (ref 18–48)
LYMPHOCYTES NFR BLD: 17.9 % (ref 18–48)
LYMPHOCYTES NFR BLD: 20.7 % (ref 18–48)
LYMPHOCYTES NFR BLD: 21.9 % (ref 18–48)
LYMPHOCYTES NFR BLD: 24.5 % (ref 18–48)
LYMPHOCYTES NFR BLD: 3.5 % (ref 18–48)
LYMPHOCYTES NFR BLD: 5 % (ref 18–48)
LYMPHOCYTES NFR BLD: 5.2 % (ref 18–48)
LYMPHOCYTES NFR BLD: 5.7 % (ref 18–48)
LYMPHOCYTES NFR BLD: 5.9 % (ref 18–48)
LYMPHOCYTES NFR BLD: 5.9 % (ref 18–48)
LYMPHOCYTES NFR BLD: 6 % (ref 18–48)
LYMPHOCYTES NFR BLD: 6 % (ref 18–48)
LYMPHOCYTES NFR BLD: 6.3 % (ref 18–48)
LYMPHOCYTES NFR BLD: 6.6 % (ref 18–48)
LYMPHOCYTES NFR BLD: 6.8 % (ref 18–48)
LYMPHOCYTES NFR BLD: 7 % (ref 18–48)
LYMPHOCYTES NFR BLD: 7.2 % (ref 18–48)
LYMPHOCYTES NFR BLD: 7.8 % (ref 18–48)
LYMPHOCYTES NFR BLD: 7.9 % (ref 18–48)
LYMPHOCYTES NFR BLD: 8.1 % (ref 18–48)
LYMPHOCYTES NFR BLD: 8.5 % (ref 18–48)
LYMPHOCYTES NFR BLD: 8.6 % (ref 18–48)
LYMPHOCYTES NFR BLD: 8.6 % (ref 18–48)
LYMPHOCYTES NFR BLD: 8.7 % (ref 18–48)
LYMPHOCYTES NFR BLD: 8.9 % (ref 18–48)
LYMPHOCYTES NFR BLD: 8.9 % (ref 18–48)
LYMPHOCYTES NFR BLD: 9 % (ref 18–48)
LYMPHOCYTES NFR BLD: 9.1 % (ref 18–48)
LYMPHOCYTES NFR BLD: 9.2 % (ref 18–48)
LYMPHOCYTES NFR BLD: 9.5 % (ref 18–48)
LYMPHOCYTES NFR BLD: 9.6 % (ref 18–48)
LYMPHOCYTES NFR BLD: 9.7 % (ref 18–48)
LYMPHOCYTES NFR BLD: 9.8 % (ref 18–48)
MAGNESIUM SERPL-MCNC: 1.7 MG/DL (ref 1.6–2.6)
MAGNESIUM SERPL-MCNC: 1.8 MG/DL (ref 1.6–2.6)
MAGNESIUM SERPL-MCNC: 1.9 MG/DL (ref 1.6–2.6)
MAGNESIUM SERPL-MCNC: 2 MG/DL (ref 1.6–2.6)
MAGNESIUM SERPL-MCNC: 2.1 MG/DL (ref 1.6–2.6)
MAGNESIUM SERPL-MCNC: 2.2 MG/DL (ref 1.6–2.6)
MAGNESIUM SERPL-MCNC: 2.3 MG/DL (ref 1.6–2.6)
MAGNESIUM SERPL-MCNC: 2.4 MG/DL (ref 1.6–2.6)
MCH RBC QN AUTO: 17.6 PG (ref 27–31)
MCH RBC QN AUTO: 17.9 PG (ref 27–31)
MCH RBC QN AUTO: 18.4 PG (ref 27–31)
MCH RBC QN AUTO: 18.4 PG (ref 27–31)
MCH RBC QN AUTO: 18.5 PG (ref 27–31)
MCH RBC QN AUTO: 18.6 PG (ref 27–31)
MCH RBC QN AUTO: 18.6 PG (ref 27–31)
MCH RBC QN AUTO: 18.7 PG (ref 27–31)
MCH RBC QN AUTO: 19 PG (ref 27–31)
MCH RBC QN AUTO: 21.1 PG (ref 27–31)
MCH RBC QN AUTO: 21.3 PG (ref 27–31)
MCH RBC QN AUTO: 21.4 PG (ref 27–31)
MCH RBC QN AUTO: 21.5 PG (ref 27–31)
MCH RBC QN AUTO: 21.6 PG (ref 27–31)
MCH RBC QN AUTO: 21.7 PG (ref 27–31)
MCH RBC QN AUTO: 21.7 PG (ref 27–31)
MCH RBC QN AUTO: 21.8 PG (ref 27–31)
MCH RBC QN AUTO: 21.9 PG (ref 27–31)
MCH RBC QN AUTO: 21.9 PG (ref 27–31)
MCH RBC QN AUTO: 22 PG (ref 27–31)
MCH RBC QN AUTO: 22 PG (ref 27–31)
MCH RBC QN AUTO: 22.1 PG (ref 27–31)
MCH RBC QN AUTO: 22.3 PG (ref 27–31)
MCH RBC QN AUTO: 22.3 PG (ref 27–31)
MCH RBC QN AUTO: 22.4 PG (ref 27–31)
MCH RBC QN AUTO: 22.5 PG (ref 27–31)
MCH RBC QN AUTO: 22.5 PG (ref 27–31)
MCH RBC QN AUTO: 22.9 PG (ref 27–31)
MCH RBC QN AUTO: 23.2 PG (ref 27–31)
MCH RBC QN AUTO: 23.2 PG (ref 27–31)
MCH RBC QN AUTO: 23.4 PG (ref 27–31)
MCH RBC QN AUTO: 23.5 PG (ref 27–31)
MCH RBC QN AUTO: 23.5 PG (ref 27–31)
MCH RBC QN AUTO: 23.6 PG (ref 27–31)
MCH RBC QN AUTO: 23.6 PG (ref 27–31)
MCH RBC QN AUTO: 23.7 PG (ref 27–31)
MCH RBC QN AUTO: 23.7 PG (ref 27–31)
MCH RBC QN AUTO: 23.8 PG (ref 27–31)
MCH RBC QN AUTO: 23.9 PG (ref 27–31)
MCH RBC QN AUTO: 24 PG (ref 27–31)
MCH RBC QN AUTO: 24.1 PG (ref 27–31)
MCH RBC QN AUTO: 24.3 PG (ref 27–31)
MCH RBC QN AUTO: 24.5 PG (ref 27–31)
MCH RBC QN AUTO: 24.6 PG (ref 27–31)
MCH RBC QN AUTO: 24.6 PG (ref 27–31)
MCHC RBC AUTO-ENTMCNC: 27.7 G/DL (ref 32–36)
MCHC RBC AUTO-ENTMCNC: 27.7 G/DL (ref 32–36)
MCHC RBC AUTO-ENTMCNC: 27.8 G/DL (ref 32–36)
MCHC RBC AUTO-ENTMCNC: 28.1 G/DL (ref 32–36)
MCHC RBC AUTO-ENTMCNC: 28.1 G/DL (ref 32–36)
MCHC RBC AUTO-ENTMCNC: 28.3 G/DL (ref 32–36)
MCHC RBC AUTO-ENTMCNC: 28.6 G/DL (ref 32–36)
MCHC RBC AUTO-ENTMCNC: 28.8 G/DL (ref 32–36)
MCHC RBC AUTO-ENTMCNC: 29.1 G/DL (ref 32–36)
MCHC RBC AUTO-ENTMCNC: 29.1 G/DL (ref 32–36)
MCHC RBC AUTO-ENTMCNC: 29.4 G/DL (ref 32–36)
MCHC RBC AUTO-ENTMCNC: 29.6 G/DL (ref 32–36)
MCHC RBC AUTO-ENTMCNC: 29.7 G/DL (ref 32–36)
MCHC RBC AUTO-ENTMCNC: 29.8 G/DL (ref 32–36)
MCHC RBC AUTO-ENTMCNC: 29.9 G/DL (ref 32–36)
MCHC RBC AUTO-ENTMCNC: 30.3 G/DL (ref 32–36)
MCHC RBC AUTO-ENTMCNC: 30.3 G/DL (ref 32–36)
MCHC RBC AUTO-ENTMCNC: 30.4 G/DL (ref 32–36)
MCHC RBC AUTO-ENTMCNC: 30.4 G/DL (ref 32–36)
MCHC RBC AUTO-ENTMCNC: 30.5 G/DL (ref 32–36)
MCHC RBC AUTO-ENTMCNC: 30.5 G/DL (ref 32–36)
MCHC RBC AUTO-ENTMCNC: 30.6 G/DL (ref 32–36)
MCHC RBC AUTO-ENTMCNC: 30.7 G/DL (ref 32–36)
MCHC RBC AUTO-ENTMCNC: 30.8 G/DL (ref 32–36)
MCHC RBC AUTO-ENTMCNC: 30.8 G/DL (ref 32–36)
MCHC RBC AUTO-ENTMCNC: 30.9 G/DL (ref 32–36)
MCHC RBC AUTO-ENTMCNC: 31.1 G/DL (ref 32–36)
MCHC RBC AUTO-ENTMCNC: 31.2 G/DL (ref 32–36)
MCHC RBC AUTO-ENTMCNC: 31.2 G/DL (ref 32–36)
MCHC RBC AUTO-ENTMCNC: 31.3 G/DL (ref 32–36)
MCHC RBC AUTO-ENTMCNC: 31.4 G/DL (ref 32–36)
MCHC RBC AUTO-ENTMCNC: 31.5 G/DL (ref 32–36)
MCHC RBC AUTO-ENTMCNC: 31.6 G/DL (ref 32–36)
MCHC RBC AUTO-ENTMCNC: 31.7 G/DL (ref 32–36)
MCHC RBC AUTO-ENTMCNC: 31.7 G/DL (ref 32–36)
MCHC RBC AUTO-ENTMCNC: 31.8 G/DL (ref 32–36)
MCHC RBC AUTO-ENTMCNC: 31.8 G/DL (ref 32–36)
MCHC RBC AUTO-ENTMCNC: 31.9 G/DL (ref 32–36)
MCHC RBC AUTO-ENTMCNC: 32 G/DL (ref 32–36)
MCHC RBC AUTO-ENTMCNC: 32.2 G/DL (ref 32–36)
MCHC RBC AUTO-ENTMCNC: 32.6 G/DL (ref 32–36)
MCHC RBC AUTO-ENTMCNC: 32.7 G/DL (ref 32–36)
MCV RBC AUTO: 63 FL (ref 82–98)
MCV RBC AUTO: 64 FL (ref 82–98)
MCV RBC AUTO: 64 FL (ref 82–98)
MCV RBC AUTO: 65 FL (ref 82–98)
MCV RBC AUTO: 66 FL (ref 82–98)
MCV RBC AUTO: 67 FL (ref 82–98)
MCV RBC AUTO: 67 FL (ref 82–98)
MCV RBC AUTO: 68 FL (ref 82–98)
MCV RBC AUTO: 68 FL (ref 82–98)
MCV RBC AUTO: 69 FL (ref 82–98)
MCV RBC AUTO: 70 FL (ref 82–98)
MCV RBC AUTO: 71 FL (ref 82–98)
MCV RBC AUTO: 72 FL (ref 82–98)
MCV RBC AUTO: 73 FL (ref 82–98)
MCV RBC AUTO: 74 FL (ref 82–98)
MCV RBC AUTO: 75 FL (ref 82–98)
MCV RBC AUTO: 76 FL (ref 82–98)
MCV RBC AUTO: 77 FL (ref 82–98)
MCV RBC AUTO: 78 FL (ref 82–98)
MCV RBC AUTO: 79 FL (ref 82–98)
MODE: ABNORMAL
MONOCYTES # BLD AUTO: 0.1 K/UL (ref 0.3–1)
MONOCYTES # BLD AUTO: 0.4 K/UL (ref 0.3–1)
MONOCYTES # BLD AUTO: 0.5 K/UL (ref 0.3–1)
MONOCYTES # BLD AUTO: 0.6 K/UL (ref 0.3–1)
MONOCYTES # BLD AUTO: 0.7 K/UL (ref 0.3–1)
MONOCYTES # BLD AUTO: 0.8 K/UL (ref 0.3–1)
MONOCYTES # BLD AUTO: 0.9 K/UL (ref 0.3–1)
MONOCYTES # BLD AUTO: 1 K/UL (ref 0.3–1)
MONOCYTES # BLD AUTO: 1.1 K/UL (ref 0.3–1)
MONOCYTES # BLD AUTO: 1.2 K/UL (ref 0.3–1)
MONOCYTES # BLD AUTO: 1.3 K/UL (ref 0.3–1)
MONOCYTES # BLD AUTO: 1.3 K/UL (ref 0.3–1)
MONOCYTES # BLD AUTO: 1.4 K/UL (ref 0.3–1)
MONOCYTES NFR BLD: 0.4 % (ref 4–15)
MONOCYTES NFR BLD: 2.1 % (ref 4–15)
MONOCYTES NFR BLD: 3.4 % (ref 4–15)
MONOCYTES NFR BLD: 3.6 % (ref 4–15)
MONOCYTES NFR BLD: 3.7 % (ref 4–15)
MONOCYTES NFR BLD: 3.9 % (ref 4–15)
MONOCYTES NFR BLD: 4 % (ref 4–15)
MONOCYTES NFR BLD: 4 % (ref 4–15)
MONOCYTES NFR BLD: 4.1 % (ref 4–15)
MONOCYTES NFR BLD: 4.2 % (ref 4–15)
MONOCYTES NFR BLD: 4.2 % (ref 4–15)
MONOCYTES NFR BLD: 4.3 % (ref 4–15)
MONOCYTES NFR BLD: 4.5 % (ref 4–15)
MONOCYTES NFR BLD: 4.7 % (ref 4–15)
MONOCYTES NFR BLD: 4.7 % (ref 4–15)
MONOCYTES NFR BLD: 4.8 % (ref 4–15)
MONOCYTES NFR BLD: 4.9 % (ref 4–15)
MONOCYTES NFR BLD: 5 % (ref 4–15)
MONOCYTES NFR BLD: 5 % (ref 4–15)
MONOCYTES NFR BLD: 5.1 % (ref 4–15)
MONOCYTES NFR BLD: 5.1 % (ref 4–15)
MONOCYTES NFR BLD: 5.2 % (ref 4–15)
MONOCYTES NFR BLD: 5.3 % (ref 4–15)
MONOCYTES NFR BLD: 5.3 % (ref 4–15)
MONOCYTES NFR BLD: 5.4 % (ref 4–15)
MONOCYTES NFR BLD: 5.4 % (ref 4–15)
MONOCYTES NFR BLD: 5.5 % (ref 4–15)
MONOCYTES NFR BLD: 5.6 % (ref 4–15)
MONOCYTES NFR BLD: 5.7 % (ref 4–15)
MONOCYTES NFR BLD: 5.8 % (ref 4–15)
MONOCYTES NFR BLD: 5.9 % (ref 4–15)
MONOCYTES NFR BLD: 6 % (ref 4–15)
MONOCYTES NFR BLD: 6.4 % (ref 4–15)
MONOCYTES NFR BLD: 6.5 % (ref 4–15)
MONOCYTES NFR BLD: 6.7 % (ref 4–15)
MONOCYTES NFR BLD: 6.8 % (ref 4–15)
MONOCYTES NFR BLD: 6.9 % (ref 4–15)
MONOCYTES NFR BLD: 6.9 % (ref 4–15)
MONOCYTES NFR BLD: 7 % (ref 4–15)
MONOCYTES NFR BLD: 7.1 % (ref 4–15)
MONOCYTES NFR BLD: 7.1 % (ref 4–15)
MONOCYTES NFR BLD: 7.2 % (ref 4–15)
MONOCYTES NFR BLD: 7.3 % (ref 4–15)
MONOCYTES NFR BLD: 7.4 % (ref 4–15)
MONOCYTES NFR BLD: 7.4 % (ref 4–15)
MONOCYTES NFR BLD: 7.5 % (ref 4–15)
MONOCYTES NFR BLD: 7.8 % (ref 4–15)
MONOCYTES NFR BLD: 8.4 % (ref 4–15)
MONOCYTES NFR BLD: 8.4 % (ref 4–15)
MONOCYTES NFR BLD: 8.8 % (ref 4–15)
MONOCYTES NFR BLD: 9 % (ref 4–15)
MV A" WAVE DURATION": 12.56 MSEC
MV MEAN GRADIENT: 2 MMHG
MV PEAK A VEL: 0.87 M/S
MV PEAK A VEL: 1.31 M/S
MV PEAK E VEL: 0.99 M/S
MV PEAK E VEL: 1.08 M/S
MV STENOSIS PRESSURE HALF TIME: 29.01 MS
MV VALVE AREA P 1/2 METHOD: 7.58 CM2
NEUTROPHILS # BLD AUTO: 10 K/UL (ref 1.8–7.7)
NEUTROPHILS # BLD AUTO: 10.3 K/UL (ref 1.8–7.7)
NEUTROPHILS # BLD AUTO: 10.3 K/UL (ref 1.8–7.7)
NEUTROPHILS # BLD AUTO: 10.4 K/UL (ref 1.8–7.7)
NEUTROPHILS # BLD AUTO: 10.5 K/UL (ref 1.8–7.7)
NEUTROPHILS # BLD AUTO: 10.6 K/UL (ref 1.8–7.7)
NEUTROPHILS # BLD AUTO: 10.6 K/UL (ref 1.8–7.7)
NEUTROPHILS # BLD AUTO: 10.9 K/UL (ref 1.8–7.7)
NEUTROPHILS # BLD AUTO: 10.9 K/UL (ref 1.8–7.7)
NEUTROPHILS # BLD AUTO: 11 K/UL (ref 1.8–7.7)
NEUTROPHILS # BLD AUTO: 11.1 K/UL (ref 1.8–7.7)
NEUTROPHILS # BLD AUTO: 11.4 K/UL (ref 1.8–7.7)
NEUTROPHILS # BLD AUTO: 11.5 K/UL (ref 1.8–7.7)
NEUTROPHILS # BLD AUTO: 11.6 K/UL (ref 1.8–7.7)
NEUTROPHILS # BLD AUTO: 11.8 K/UL (ref 1.8–7.7)
NEUTROPHILS # BLD AUTO: 11.8 K/UL (ref 1.8–7.7)
NEUTROPHILS # BLD AUTO: 11.9 K/UL (ref 1.8–7.7)
NEUTROPHILS # BLD AUTO: 11.9 K/UL (ref 1.8–7.7)
NEUTROPHILS # BLD AUTO: 12 K/UL (ref 1.8–7.7)
NEUTROPHILS # BLD AUTO: 12.1 K/UL (ref 1.8–7.7)
NEUTROPHILS # BLD AUTO: 12.5 K/UL (ref 1.8–7.7)
NEUTROPHILS # BLD AUTO: 13 K/UL (ref 1.8–7.7)
NEUTROPHILS # BLD AUTO: 13 K/UL (ref 1.8–7.7)
NEUTROPHILS # BLD AUTO: 13.2 K/UL (ref 1.8–7.7)
NEUTROPHILS # BLD AUTO: 13.3 K/UL (ref 1.8–7.7)
NEUTROPHILS # BLD AUTO: 13.8 K/UL (ref 1.8–7.7)
NEUTROPHILS # BLD AUTO: 14.1 K/UL (ref 1.8–7.7)
NEUTROPHILS # BLD AUTO: 14.8 K/UL (ref 1.8–7.7)
NEUTROPHILS # BLD AUTO: 14.8 K/UL (ref 1.8–7.7)
NEUTROPHILS # BLD AUTO: 15.7 K/UL (ref 1.8–7.7)
NEUTROPHILS # BLD AUTO: 16.6 K/UL (ref 1.8–7.7)
NEUTROPHILS # BLD AUTO: 17.3 K/UL (ref 1.8–7.7)
NEUTROPHILS # BLD AUTO: 17.6 K/UL (ref 1.8–7.7)
NEUTROPHILS # BLD AUTO: 18.6 K/UL (ref 1.8–7.7)
NEUTROPHILS # BLD AUTO: 19 K/UL (ref 1.8–7.7)
NEUTROPHILS # BLD AUTO: 21 K/UL (ref 1.8–7.7)
NEUTROPHILS # BLD AUTO: 27.3 K/UL (ref 1.8–7.7)
NEUTROPHILS # BLD AUTO: 5 K/UL (ref 1.8–7.7)
NEUTROPHILS # BLD AUTO: 5.4 K/UL (ref 1.8–7.7)
NEUTROPHILS # BLD AUTO: 5.9 K/UL (ref 1.8–7.7)
NEUTROPHILS # BLD AUTO: 6.2 K/UL (ref 1.8–7.7)
NEUTROPHILS # BLD AUTO: 6.2 K/UL (ref 1.8–7.7)
NEUTROPHILS # BLD AUTO: 6.5 K/UL (ref 1.8–7.7)
NEUTROPHILS # BLD AUTO: 6.9 K/UL (ref 1.8–7.7)
NEUTROPHILS # BLD AUTO: 6.9 K/UL (ref 1.8–7.7)
NEUTROPHILS # BLD AUTO: 7 K/UL (ref 1.8–7.7)
NEUTROPHILS # BLD AUTO: 7 K/UL (ref 1.8–7.7)
NEUTROPHILS # BLD AUTO: 7.1 K/UL (ref 1.8–7.7)
NEUTROPHILS # BLD AUTO: 7.1 K/UL (ref 1.8–7.7)
NEUTROPHILS # BLD AUTO: 7.3 K/UL (ref 1.8–7.7)
NEUTROPHILS # BLD AUTO: 7.4 K/UL (ref 1.8–7.7)
NEUTROPHILS # BLD AUTO: 7.6 K/UL (ref 1.8–7.7)
NEUTROPHILS # BLD AUTO: 7.7 K/UL (ref 1.8–7.7)
NEUTROPHILS # BLD AUTO: 7.8 K/UL (ref 1.8–7.7)
NEUTROPHILS # BLD AUTO: 7.9 K/UL (ref 1.8–7.7)
NEUTROPHILS # BLD AUTO: 8 K/UL (ref 1.8–7.7)
NEUTROPHILS # BLD AUTO: 8 K/UL (ref 1.8–7.7)
NEUTROPHILS # BLD AUTO: 8.4 K/UL (ref 1.8–7.7)
NEUTROPHILS # BLD AUTO: 9 K/UL (ref 1.8–7.7)
NEUTROPHILS # BLD AUTO: 9.1 K/UL (ref 1.8–7.7)
NEUTROPHILS # BLD AUTO: 9.4 K/UL (ref 1.8–7.7)
NEUTROPHILS # BLD AUTO: 9.4 K/UL (ref 1.8–7.7)
NEUTROPHILS # BLD AUTO: 9.7 K/UL (ref 1.8–7.7)
NEUTROPHILS # BLD AUTO: 9.8 K/UL (ref 1.8–7.7)
NEUTROPHILS # BLD AUTO: 9.8 K/UL (ref 1.8–7.7)
NEUTROPHILS NFR BLD: 63.3 % (ref 38–73)
NEUTROPHILS NFR BLD: 67 % (ref 38–73)
NEUTROPHILS NFR BLD: 69.5 % (ref 38–73)
NEUTROPHILS NFR BLD: 71 % (ref 38–73)
NEUTROPHILS NFR BLD: 71.8 % (ref 38–73)
NEUTROPHILS NFR BLD: 71.9 % (ref 38–73)
NEUTROPHILS NFR BLD: 72.7 % (ref 38–73)
NEUTROPHILS NFR BLD: 73.1 % (ref 38–73)
NEUTROPHILS NFR BLD: 73.2 % (ref 38–73)
NEUTROPHILS NFR BLD: 73.8 % (ref 38–73)
NEUTROPHILS NFR BLD: 74 % (ref 38–73)
NEUTROPHILS NFR BLD: 74.2 % (ref 38–73)
NEUTROPHILS NFR BLD: 74.6 % (ref 38–73)
NEUTROPHILS NFR BLD: 74.7 % (ref 38–73)
NEUTROPHILS NFR BLD: 74.7 % (ref 38–73)
NEUTROPHILS NFR BLD: 75 % (ref 38–73)
NEUTROPHILS NFR BLD: 75.1 % (ref 38–73)
NEUTROPHILS NFR BLD: 75.4 % (ref 38–73)
NEUTROPHILS NFR BLD: 76 % (ref 38–73)
NEUTROPHILS NFR BLD: 76 % (ref 38–73)
NEUTROPHILS NFR BLD: 76.1 % (ref 38–73)
NEUTROPHILS NFR BLD: 76.2 % (ref 38–73)
NEUTROPHILS NFR BLD: 76.2 % (ref 38–73)
NEUTROPHILS NFR BLD: 76.3 % (ref 38–73)
NEUTROPHILS NFR BLD: 76.9 % (ref 38–73)
NEUTROPHILS NFR BLD: 77 % (ref 38–73)
NEUTROPHILS NFR BLD: 77.2 % (ref 38–73)
NEUTROPHILS NFR BLD: 77.8 % (ref 38–73)
NEUTROPHILS NFR BLD: 77.9 % (ref 38–73)
NEUTROPHILS NFR BLD: 78 % (ref 38–73)
NEUTROPHILS NFR BLD: 78 % (ref 38–73)
NEUTROPHILS NFR BLD: 78.4 % (ref 38–73)
NEUTROPHILS NFR BLD: 78.6 % (ref 38–73)
NEUTROPHILS NFR BLD: 78.6 % (ref 38–73)
NEUTROPHILS NFR BLD: 78.9 % (ref 38–73)
NEUTROPHILS NFR BLD: 79.3 % (ref 38–73)
NEUTROPHILS NFR BLD: 79.5 % (ref 38–73)
NEUTROPHILS NFR BLD: 79.5 % (ref 38–73)
NEUTROPHILS NFR BLD: 79.6 % (ref 38–73)
NEUTROPHILS NFR BLD: 79.8 % (ref 38–73)
NEUTROPHILS NFR BLD: 80.2 % (ref 38–73)
NEUTROPHILS NFR BLD: 80.2 % (ref 38–73)
NEUTROPHILS NFR BLD: 80.3 % (ref 38–73)
NEUTROPHILS NFR BLD: 80.9 % (ref 38–73)
NEUTROPHILS NFR BLD: 81 % (ref 38–73)
NEUTROPHILS NFR BLD: 81.4 % (ref 38–73)
NEUTROPHILS NFR BLD: 81.4 % (ref 38–73)
NEUTROPHILS NFR BLD: 81.8 % (ref 38–73)
NEUTROPHILS NFR BLD: 81.9 % (ref 38–73)
NEUTROPHILS NFR BLD: 82 % (ref 38–73)
NEUTROPHILS NFR BLD: 82.4 % (ref 38–73)
NEUTROPHILS NFR BLD: 82.6 % (ref 38–73)
NEUTROPHILS NFR BLD: 82.6 % (ref 38–73)
NEUTROPHILS NFR BLD: 82.8 % (ref 38–73)
NEUTROPHILS NFR BLD: 83.1 % (ref 38–73)
NEUTROPHILS NFR BLD: 83.4 % (ref 38–73)
NEUTROPHILS NFR BLD: 83.8 % (ref 38–73)
NEUTROPHILS NFR BLD: 83.8 % (ref 38–73)
NEUTROPHILS NFR BLD: 85.1 % (ref 38–73)
NEUTROPHILS NFR BLD: 85.7 % (ref 38–73)
NEUTROPHILS NFR BLD: 86.3 % (ref 38–73)
NEUTROPHILS NFR BLD: 86.3 % (ref 38–73)
NEUTROPHILS NFR BLD: 86.6 % (ref 38–73)
NEUTROPHILS NFR BLD: 86.7 % (ref 38–73)
NEUTROPHILS NFR BLD: 86.7 % (ref 38–73)
NEUTROPHILS NFR BLD: 86.9 % (ref 38–73)
NEUTROPHILS NFR BLD: 87 % (ref 38–73)
NEUTROPHILS NFR BLD: 87.2 % (ref 38–73)
NEUTROPHILS NFR BLD: 87.5 % (ref 38–73)
NEUTROPHILS NFR BLD: 87.6 % (ref 38–73)
NEUTROPHILS NFR BLD: 89.3 % (ref 38–73)
NEUTROPHILS NFR BLD: 91.3 % (ref 38–73)
NEUTROPHILS NFR BLD: 95.5 % (ref 38–73)
NONHDLC SERPL-MCNC: 86 MG/DL
NRBC BLD-RTO: 0 /100 WBC
NUM UNITS TRANS FFP: NORMAL
NUM UNITS TRANS PACKED RBC: NORMAL
OSMOLALITY SERPL: 271 MOSM/KG (ref 280–300)
OSMOLALITY UR: 438 MOSM/KG (ref 50–1200)
OVALOCYTES BLD QL SMEAR: ABNORMAL
PCO2 BLDA: 31.6 MMHG (ref 35–45)
PCO2 BLDA: 33.8 MMHG (ref 35–45)
PCO2 BLDA: 40.4 MMHG (ref 35–45)
PCO2 BLDA: 45.4 MMHG (ref 35–45)
PH SMN: 7.48 [PH] (ref 7.35–7.45)
PH SMN: 7.52 [PH] (ref 7.35–7.45)
PH SMN: 7.54 [PH] (ref 7.35–7.45)
PH SMN: 7.54 [PH] (ref 7.35–7.45)
PHOSPHATE SERPL-MCNC: 2.5 MG/DL (ref 2.7–4.5)
PHOSPHATE SERPL-MCNC: 2.5 MG/DL (ref 2.7–4.5)
PHOSPHATE SERPL-MCNC: 2.7 MG/DL (ref 2.7–4.5)
PHOSPHATE SERPL-MCNC: 2.8 MG/DL (ref 2.7–4.5)
PHOSPHATE SERPL-MCNC: 3 MG/DL (ref 2.7–4.5)
PHOSPHATE SERPL-MCNC: 3.2 MG/DL (ref 2.7–4.5)
PHOSPHATE SERPL-MCNC: 3.3 MG/DL (ref 2.7–4.5)
PHOSPHATE SERPL-MCNC: 3.4 MG/DL (ref 2.7–4.5)
PHOSPHATE SERPL-MCNC: 3.4 MG/DL (ref 2.7–4.5)
PHOSPHATE SERPL-MCNC: 3.5 MG/DL (ref 2.7–4.5)
PHOSPHATE SERPL-MCNC: 3.6 MG/DL (ref 2.7–4.5)
PHOSPHATE SERPL-MCNC: 3.7 MG/DL (ref 2.7–4.5)
PHOSPHATE SERPL-MCNC: 3.8 MG/DL (ref 2.7–4.5)
PHOSPHATE SERPL-MCNC: 3.9 MG/DL (ref 2.7–4.5)
PHOSPHATE SERPL-MCNC: 3.9 MG/DL (ref 2.7–4.5)
PHOSPHATE SERPL-MCNC: 4 MG/DL (ref 2.7–4.5)
PHOSPHATE SERPL-MCNC: 4.1 MG/DL (ref 2.7–4.5)
PHOSPHATE SERPL-MCNC: 4.2 MG/DL (ref 2.7–4.5)
PHOSPHATE SERPL-MCNC: 4.3 MG/DL (ref 2.7–4.5)
PHOSPHATE SERPL-MCNC: 4.3 MG/DL (ref 2.7–4.5)
PHOSPHATE SERPL-MCNC: 4.4 MG/DL (ref 2.7–4.5)
PHOSPHATE SERPL-MCNC: 4.5 MG/DL (ref 2.7–4.5)
PHOSPHATE SERPL-MCNC: 4.6 MG/DL (ref 2.7–4.5)
PHOSPHATE SERPL-MCNC: 4.7 MG/DL (ref 2.7–4.5)
PHOSPHATE SERPL-MCNC: 4.8 MG/DL (ref 2.7–4.5)
PISA TR MAX VEL: 2.16 M/S
PISA TR MAX VEL: 2.51 M/S
PLATELET # BLD AUTO: 342 K/UL (ref 150–350)
PLATELET # BLD AUTO: 367 K/UL (ref 150–350)
PLATELET # BLD AUTO: 386 K/UL (ref 150–450)
PLATELET # BLD AUTO: 387 K/UL (ref 150–450)
PLATELET # BLD AUTO: 390 K/UL (ref 150–350)
PLATELET # BLD AUTO: 392 K/UL (ref 150–450)
PLATELET # BLD AUTO: 397 K/UL (ref 150–450)
PLATELET # BLD AUTO: 401 K/UL (ref 150–450)
PLATELET # BLD AUTO: 405 K/UL (ref 150–450)
PLATELET # BLD AUTO: 414 K/UL (ref 150–450)
PLATELET # BLD AUTO: 415 K/UL (ref 150–450)
PLATELET # BLD AUTO: 420 K/UL (ref 150–450)
PLATELET # BLD AUTO: 423 K/UL (ref 150–450)
PLATELET # BLD AUTO: 426 K/UL (ref 150–450)
PLATELET # BLD AUTO: 429 K/UL (ref 150–450)
PLATELET # BLD AUTO: 429 K/UL (ref 150–450)
PLATELET # BLD AUTO: 432 K/UL (ref 150–450)
PLATELET # BLD AUTO: 433 K/UL (ref 150–450)
PLATELET # BLD AUTO: 437 K/UL (ref 150–450)
PLATELET # BLD AUTO: 437 K/UL (ref 150–450)
PLATELET # BLD AUTO: 438 K/UL (ref 150–350)
PLATELET # BLD AUTO: 439 K/UL (ref 150–350)
PLATELET # BLD AUTO: 443 K/UL (ref 150–450)
PLATELET # BLD AUTO: 447 K/UL (ref 150–350)
PLATELET # BLD AUTO: 447 K/UL (ref 150–450)
PLATELET # BLD AUTO: 449 K/UL (ref 150–450)
PLATELET # BLD AUTO: 455 K/UL (ref 150–450)
PLATELET # BLD AUTO: 456 K/UL (ref 150–350)
PLATELET # BLD AUTO: 456 K/UL (ref 150–350)
PLATELET # BLD AUTO: 464 K/UL (ref 150–450)
PLATELET # BLD AUTO: 465 K/UL (ref 150–450)
PLATELET # BLD AUTO: 466 K/UL (ref 150–450)
PLATELET # BLD AUTO: 470 K/UL (ref 150–450)
PLATELET # BLD AUTO: 472 K/UL (ref 150–450)
PLATELET # BLD AUTO: 475 K/UL (ref 150–450)
PLATELET # BLD AUTO: 476 K/UL (ref 150–450)
PLATELET # BLD AUTO: 476 K/UL (ref 150–450)
PLATELET # BLD AUTO: 477 K/UL (ref 150–450)
PLATELET # BLD AUTO: 478 K/UL (ref 150–450)
PLATELET # BLD AUTO: 480 K/UL (ref 150–450)
PLATELET # BLD AUTO: 486 K/UL (ref 150–450)
PLATELET # BLD AUTO: 486 K/UL (ref 150–450)
PLATELET # BLD AUTO: 488 K/UL (ref 150–450)
PLATELET # BLD AUTO: 493 K/UL (ref 150–450)
PLATELET # BLD AUTO: 496 K/UL (ref 150–350)
PLATELET # BLD AUTO: 500 K/UL (ref 150–450)
PLATELET # BLD AUTO: 501 K/UL (ref 150–450)
PLATELET # BLD AUTO: 503 K/UL (ref 150–450)
PLATELET # BLD AUTO: 510 K/UL (ref 150–350)
PLATELET # BLD AUTO: 518 K/UL (ref 150–450)
PLATELET # BLD AUTO: 523 K/UL (ref 150–450)
PLATELET # BLD AUTO: 532 K/UL (ref 150–350)
PLATELET # BLD AUTO: 561 K/UL (ref 150–350)
PLATELET # BLD AUTO: 574 K/UL (ref 150–350)
PLATELET # BLD AUTO: 576 K/UL (ref 150–350)
PLATELET # BLD AUTO: 577 K/UL (ref 150–350)
PLATELET # BLD AUTO: 587 K/UL (ref 150–450)
PLATELET # BLD AUTO: 591 K/UL (ref 150–450)
PLATELET # BLD AUTO: 595 K/UL (ref 150–350)
PLATELET # BLD AUTO: 596 K/UL (ref 150–350)
PLATELET # BLD AUTO: 608 K/UL (ref 150–350)
PLATELET # BLD AUTO: 613 K/UL (ref 150–450)
PLATELET # BLD AUTO: 616 K/UL (ref 150–450)
PLATELET # BLD AUTO: 627 K/UL (ref 150–450)
PLATELET # BLD AUTO: 637 K/UL (ref 150–450)
PLATELET # BLD AUTO: 639 K/UL (ref 150–450)
PLATELET # BLD AUTO: 642 K/UL (ref 150–350)
PLATELET # BLD AUTO: 648 K/UL (ref 150–450)
PLATELET # BLD AUTO: 651 K/UL (ref 150–450)
PLATELET # BLD AUTO: 653 K/UL (ref 150–450)
PLATELET # BLD AUTO: 670 K/UL (ref 150–450)
PLATELET # BLD AUTO: 675 K/UL (ref 150–450)
PLATELET BLD QL SMEAR: ABNORMAL
PLATELET BLD QL SMEAR: ABNORMAL
PLATELET COUNT: 363 K/UL (ref 130–400)
PLATELET COUNT: 365 K/UL (ref 130–400)
PMV BLD AUTO: 10 FL (ref 9.2–12.9)
PMV BLD AUTO: 10 FL (ref 9.2–12.9)
PMV BLD AUTO: 10.5 FL (ref 9.2–12.9)
PMV BLD AUTO: 10.9 FL (ref 9.2–12.9)
PMV BLD AUTO: 8.3 FL (ref 9.2–12.9)
PMV BLD AUTO: 8.4 FL (ref 9.2–12.9)
PMV BLD AUTO: 8.5 FL (ref 9.2–12.9)
PMV BLD AUTO: 8.5 FL (ref 9.2–12.9)
PMV BLD AUTO: 8.6 FL (ref 9.2–12.9)
PMV BLD AUTO: 8.7 FL (ref 9.2–12.9)
PMV BLD AUTO: 8.8 FL (ref 9.2–12.9)
PMV BLD AUTO: 8.9 FL (ref 9.2–12.9)
PMV BLD AUTO: 9 FL (ref 9.2–12.9)
PMV BLD AUTO: 9.1 FL (ref 9.2–12.9)
PMV BLD AUTO: 9.2 FL (ref 9.2–12.9)
PMV BLD AUTO: 9.3 FL (ref 9.2–12.9)
PMV BLD AUTO: 9.4 FL (ref 9.2–12.9)
PMV BLD AUTO: 9.4 FL (ref 9.2–12.9)
PMV BLD AUTO: 9.5 FL (ref 9.2–12.9)
PMV BLD AUTO: 9.6 FL (ref 9.2–12.9)
PMV BLD AUTO: 9.8 FL (ref 9.2–12.9)
PO2 BLDA: 43 MMHG (ref 40–60)
PO2 BLDA: 44 MMHG (ref 40–60)
PO2 BLDA: 58 MMHG (ref 40–60)
PO2 BLDA: 65 MMHG (ref 40–60)
POC BE: 10 MMOL/L
POC BE: 12 MMOL/L
POC BE: 3 MMOL/L
POC BE: 6 MMOL/L
POC IONIZED CALCIUM: 0.91 MMOL/L (ref 1.06–1.42)
POC IONIZED CALCIUM: 1.1 MMOL/L (ref 1.06–1.42)
POC IONIZED CALCIUM: 1.18 MMOL/L (ref 1.06–1.42)
POC SATURATED O2: 82 % (ref 95–100)
POC SATURATED O2: 84 % (ref 95–100)
POC SATURATED O2: 93 % (ref 95–100)
POC SATURATED O2: 95 % (ref 95–100)
POC TCO2 (MEASURED): 24 MMOL/L (ref 23–29)
POC TCO2: 27 MMOL/L (ref 24–29)
POC TCO2: 30 MMOL/L (ref 24–29)
POC TCO2: 35 MMOL/L (ref 24–29)
POC TCO2: 36 MMOL/L (ref 24–29)
POCT GLUCOSE: 103 MG/DL (ref 70–110)
POCT GLUCOSE: 106 MG/DL (ref 70–110)
POCT GLUCOSE: 110 MG/DL (ref 70–110)
POCT GLUCOSE: 111 MG/DL (ref 70–110)
POCT GLUCOSE: 112 MG/DL (ref 70–110)
POCT GLUCOSE: 116 MG/DL (ref 70–110)
POCT GLUCOSE: 117 MG/DL (ref 70–110)
POCT GLUCOSE: 122 MG/DL (ref 70–110)
POCT GLUCOSE: 124 MG/DL (ref 70–110)
POCT GLUCOSE: 126 MG/DL (ref 70–110)
POCT GLUCOSE: 133 MG/DL (ref 70–110)
POCT GLUCOSE: 135 MG/DL (ref 70–110)
POCT GLUCOSE: 142 MG/DL (ref 70–110)
POCT GLUCOSE: 143 MG/DL (ref 70–110)
POCT GLUCOSE: 150 MG/DL (ref 70–110)
POCT GLUCOSE: 160 MG/DL (ref 70–110)
POCT GLUCOSE: 161 MG/DL (ref 70–110)
POCT GLUCOSE: 199 MG/DL (ref 70–110)
POIKILOCYTOSIS BLD QL SMEAR: SLIGHT
POLYCHROMASIA BLD QL SMEAR: ABNORMAL
POTASSIUM BLD-SCNC: 3.3 MMOL/L (ref 3.5–5.1)
POTASSIUM BLD-SCNC: 4.1 MMOL/L (ref 3.5–5.1)
POTASSIUM BLD-SCNC: >9 MMOL/L (ref 3.5–5.1)
POTASSIUM SERPL-SCNC: 3.1 MMOL/L (ref 3.5–5.1)
POTASSIUM SERPL-SCNC: 3.3 MMOL/L (ref 3.5–5.1)
POTASSIUM SERPL-SCNC: 3.5 MMOL/L (ref 3.5–5.1)
POTASSIUM SERPL-SCNC: 3.6 MMOL/L (ref 3.5–5.1)
POTASSIUM SERPL-SCNC: 3.6 MMOL/L (ref 3.5–5.1)
POTASSIUM SERPL-SCNC: 3.7 MMOL/L (ref 3.5–5.1)
POTASSIUM SERPL-SCNC: 3.8 MMOL/L (ref 3.5–5.1)
POTASSIUM SERPL-SCNC: 3.9 MMOL/L (ref 3.5–5.1)
POTASSIUM SERPL-SCNC: 4 MMOL/L (ref 3.5–5.1)
POTASSIUM SERPL-SCNC: 4.1 MMOL/L (ref 3.5–5.1)
POTASSIUM SERPL-SCNC: 4.2 MMOL/L (ref 3.5–5.1)
POTASSIUM SERPL-SCNC: 4.3 MMOL/L (ref 3.5–5.1)
POTASSIUM SERPL-SCNC: 4.4 MMOL/L (ref 3.5–5.1)
POTASSIUM SERPL-SCNC: 4.5 MMOL/L (ref 3.5–5.1)
POTASSIUM SERPL-SCNC: 4.6 MMOL/L (ref 3.5–5.1)
POTASSIUM SERPL-SCNC: 4.7 MMOL/L (ref 3.5–5.1)
POTASSIUM SERPL-SCNC: 4.8 MMOL/L (ref 3.5–5.1)
POTASSIUM SERPL-SCNC: 4.9 MMOL/L (ref 3.5–5.1)
POTASSIUM SERPL-SCNC: 5.1 MMOL/L (ref 3.5–5.1)
POTASSIUM SERPL-SCNC: 5.3 MMOL/L (ref 3.5–5.1)
POTASSIUM SERPL-SCNC: 5.4 MMOL/L (ref 3.5–5.1)
POTASSIUM: 3.5 MEQ/L (ref 3.5–5.1)
POTASSIUM: 4 MEQ/L (ref 3.5–5.1)
PREALB SERPL-MCNC: 10 MG/DL (ref 20–43)
PREALB SERPL-MCNC: 11 MG/DL (ref 20–43)
PREALB SERPL-MCNC: 12 MG/DL (ref 20–43)
PREALB SERPL-MCNC: 13 MG/DL (ref 20–43)
PREALB SERPL-MCNC: 13 MG/DL (ref 20–43)
PREALB SERPL-MCNC: 14 MG/DL (ref 20–43)
PREALB SERPL-MCNC: 15 MG/DL (ref 20–43)
PREALB SERPL-MCNC: 15 MG/DL (ref 20–43)
PREALB SERPL-MCNC: 16 MG/DL (ref 20–43)
PREALB SERPL-MCNC: 16 MG/DL (ref 20–43)
PREALB SERPL-MCNC: 17 MG/DL (ref 20–43)
PREALB SERPL-MCNC: 17 MG/DL (ref 20–43)
PREALB SERPL-MCNC: 20 MG/DL (ref 20–43)
PREALB SERPL-MCNC: 20 MG/DL (ref 20–43)
PREALB SERPL-MCNC: 21 MG/DL (ref 20–43)
PREALB SERPL-MCNC: 21 MG/DL (ref 20–43)
PREALB SERPL-MCNC: 22 MG/DL (ref 20–43)
PREALB SERPL-MCNC: 25 MG/DL (ref 20–43)
PREALB SERPL-MCNC: 8 MG/DL (ref 20–43)
PROCALCITONIN SERPL IA-MCNC: 0.11 NG/ML
PROT SERPL-MCNC: 6.5 G/DL (ref 6–8.4)
PROT SERPL-MCNC: 6.6 G/DL (ref 6–8.4)
PROT SERPL-MCNC: 6.7 G/DL (ref 6–8.4)
PROT SERPL-MCNC: 6.7 G/DL (ref 6–8.4)
PROT SERPL-MCNC: 6.9 G/DL (ref 6–8.4)
PROT SERPL-MCNC: 7 G/DL (ref 6–8.4)
PROT SERPL-MCNC: 7.1 G/DL (ref 6–8.4)
PROT SERPL-MCNC: 7.1 G/DL (ref 6–8.4)
PROT SERPL-MCNC: 7.2 G/DL (ref 6–8.4)
PROT SERPL-MCNC: 7.3 G/DL (ref 6–8.4)
PROT SERPL-MCNC: 7.3 G/DL (ref 6–8.4)
PROT SERPL-MCNC: 7.4 G/DL (ref 6–8.4)
PROT SERPL-MCNC: 7.5 G/DL (ref 6–8.4)
PROT SERPL-MCNC: 7.6 G/DL (ref 6–8.4)
PROT SERPL-MCNC: 7.6 G/DL (ref 6–8.4)
PROT SERPL-MCNC: 7.7 G/DL (ref 6–8.4)
PROT SERPL-MCNC: 7.8 G/DL (ref 6–8.4)
PROT SERPL-MCNC: 7.8 G/DL (ref 6–8.4)
PROT SERPL-MCNC: 7.9 G/DL (ref 6–8.4)
PROT SERPL-MCNC: 7.9 G/DL (ref 6–8.4)
PROT SERPL-MCNC: 8 G/DL (ref 6–8.4)
PROT SERPL-MCNC: 8 G/DL (ref 6–8.4)
PROT SERPL-MCNC: 8.1 G/DL (ref 6–8.4)
PROT SERPL-MCNC: 8.2 G/DL (ref 6–8.4)
PROT SERPL-MCNC: 8.3 G/DL (ref 6–8.4)
PROT SERPL-MCNC: 8.4 G/DL (ref 6–8.4)
PROT SERPL-MCNC: 8.4 G/DL (ref 6–8.4)
PROT SERPL-MCNC: 8.5 G/DL (ref 6–8.4)
PROT SERPL-MCNC: 8.5 G/DL (ref 6–8.4)
PROT SERPL-MCNC: 8.9 G/DL (ref 6–8.4)
PROT SERPL-MCNC: 9.3 G/DL (ref 6–8.4)
PROT SERPL-MCNC: 9.3 G/DL (ref 6–8.4)
PROT UR-MCNC: 12 MG/DL (ref 0–15)
PROT/CREAT UR: 0.15 MG/G{CREAT} (ref 0–0.2)
PROTHROMBIN TIME: 11.4 SEC (ref 9–12.5)
PROTHROMBIN TIME: 11.6 SEC (ref 9–12.5)
PROTHROMBIN TIME: 11.8 SEC (ref 9–12.5)
PROTHROMBIN TIME: 12.1 SEC (ref 9–12.5)
PROTHROMBIN TIME: 12.1 SEC (ref 9–12.5)
PROTHROMBIN TIME: 12.2 SEC (ref 9–12.5)
PROTHROMBIN TIME: 12.2 SEC (ref 9–12.5)
PROTHROMBIN TIME: 12.4 SEC (ref 9–12.5)
PROTHROMBIN TIME: 12.5 SEC (ref 9–12.5)
PROTHROMBIN TIME: 12.6 SEC (ref 9–12.5)
PROTHROMBIN TIME: 12.6 SEC (ref 9–12.5)
PROTHROMBIN TIME: 12.7 SEC (ref 9–12.5)
PROTHROMBIN TIME: 12.8 SEC (ref 9–12.5)
PROTHROMBIN TIME: 13 SEC (ref 9–12.5)
PROTHROMBIN TIME: 13.1 SEC (ref 9–12.5)
PROTHROMBIN TIME: 13.3 SEC (ref 9–12.5)
PROTHROMBIN TIME: 13.4 SEC (ref 9–12.5)
PROTHROMBIN TIME: 13.5 SEC (ref 9–12.5)
PROTHROMBIN TIME: 13.5 SEC (ref 9–12.5)
PROTHROMBIN TIME: 13.6 SEC (ref 9–12.5)
PROTHROMBIN TIME: 13.6 SEC (ref 9–12.5)
PROTHROMBIN TIME: 13.8 SEC (ref 9–12.5)
PROTHROMBIN TIME: 13.8 SEC (ref 9–12.5)
PROTHROMBIN TIME: 13.9 SEC (ref 9–12.5)
PROTHROMBIN TIME: 13.9 SEC (ref 9–12.5)
PROTHROMBIN TIME: 14 SEC (ref 9–12.5)
PROTHROMBIN TIME: 14.2 SEC (ref 9–12.5)
PROTHROMBIN TIME: 14.4 SEC (ref 9–12.5)
PROTHROMBIN TIME: 14.5 SEC (ref 9–12.5)
PROTHROMBIN TIME: 15 SEC (ref 9–12.5)
PROTHROMBIN TIME: 15.6 SEC (ref 9–12.5)
PROTHROMBIN TIME: 15.8 SEC (ref 9–12.5)
PROTHROMBIN TIME: 16.2 SEC (ref 9–12.5)
PROTHROMBIN TIME: 16.7 SEC (ref 9–12.5)
PROTHROMBIN TIME: 16.7 SEC (ref 9–12.5)
PROTHROMBIN TIME: 17.1 SEC (ref 9–12.5)
PROTHROMBIN TIME: 17.7 SEC (ref 9–12.5)
PROTHROMBIN TIME: 17.9 SEC (ref 9–12.5)
PROTHROMBIN TIME: 18 SEC (ref 9–12.5)
PROTHROMBIN TIME: 18.6 SEC (ref 9–12.5)
PROTHROMBIN TIME: 18.9 SEC (ref 9–12.5)
PROTHROMBIN TIME: 19.3 SEC (ref 9–12.5)
PROTHROMBIN TIME: 20.8 SEC (ref 9–12.5)
PROTHROMBIN TIME: 20.9 SEC (ref 9–12.5)
PROTHROMBIN TIME: 20.9 SEC (ref 9–12.5)
PROTHROMBIN TIME: 21.1 SEC (ref 9–12.5)
PROTHROMBIN TIME: 21.2 SEC (ref 9–12.5)
PROTHROMBIN TIME: 21.2 SEC (ref 9–12.5)
PROTHROMBIN TIME: 23.5 SEC (ref 9–12.5)
PROTHROMBIN TIME: 23.9 SEC (ref 9–12.5)
PROTHROMBIN TIME: 24.4 SEC (ref 9–12.5)
PROTHROMBIN TIME: 24.6 SEC (ref 9–12.5)
PROTHROMBIN TIME: 24.8 SEC (ref 9–12.5)
PROTHROMBIN TIME: 25.1 SEC (ref 9–12.5)
PROTHROMBIN TIME: 25.7 SEC (ref 9–12.5)
PROTHROMBIN TIME: 26.2 SEC (ref 9–12.5)
PROTHROMBIN TIME: 27.2 SEC (ref 9–12.5)
PROTHROMBIN TIME: 28 SEC (ref 9–12.5)
PROTHROMBIN TIME: 28.3 SEC (ref 9–12.5)
PROTHROMBIN TIME: 28.5 SEC (ref 9–12.5)
PROTHROMBIN TIME: 28.7 SEC (ref 9–12.5)
PROTHROMBIN TIME: 29.3 SEC (ref 9–12.5)
PROTHROMBIN TIME: 29.9 SEC (ref 9–12.5)
PROTHROMBIN TIME: 31.6 SEC (ref 9–12.5)
PROTHROMBIN TIME: 32 SEC (ref 9–12.5)
PROTHROMBIN TIME: 34.1 SEC (ref 9–12.5)
PROTHROMBIN TIME: 34.6 SEC (ref 9–12.5)
PROTHROMBIN TIME: 34.8 SEC (ref 9–12.5)
PULM VEIN S/D RATIO: 0.71
PV PEAK D VEL: 0.51 M/S
PV PEAK S VEL: 0.36 M/S
RA MAJOR: 4.21 CM
RA MAJOR: 5.61 CM
RA PRESSURE: 3 MMHG
RA PRESSURE: 8 MMHG
RA WIDTH: 3.12 CM
RA WIDTH: 4.52 CM
RBC # BLD AUTO: 3.42 M/UL (ref 4.6–6.2)
RBC # BLD AUTO: 3.52 M/UL (ref 4.6–6.2)
RBC # BLD AUTO: 3.55 M/UL (ref 4.6–6.2)
RBC # BLD AUTO: 3.63 M/UL (ref 4.6–6.2)
RBC # BLD AUTO: 3.64 M/UL (ref 4.6–6.2)
RBC # BLD AUTO: 3.64 M/UL (ref 4.6–6.2)
RBC # BLD AUTO: 3.65 M/UL (ref 4.6–6.2)
RBC # BLD AUTO: 3.68 M/UL (ref 4.6–6.2)
RBC # BLD AUTO: 3.73 M/UL (ref 4.6–6.2)
RBC # BLD AUTO: 3.74 M/UL (ref 4.6–6.2)
RBC # BLD AUTO: 3.75 M/UL (ref 4.6–6.2)
RBC # BLD AUTO: 3.88 M/UL (ref 4.6–6.2)
RBC # BLD AUTO: 3.89 M/UL (ref 4.6–6.2)
RBC # BLD AUTO: 3.9 M/UL (ref 4.6–6.2)
RBC # BLD AUTO: 3.9 M/UL (ref 4.6–6.2)
RBC # BLD AUTO: 3.95 M/UL (ref 4.6–6.2)
RBC # BLD AUTO: 3.97 M/UL (ref 4.6–6.2)
RBC # BLD AUTO: 4.03 M/UL (ref 4.6–6.2)
RBC # BLD AUTO: 4.07 M/UL (ref 4.6–6.2)
RBC # BLD AUTO: 4.13 M/UL (ref 4.6–6.2)
RBC # BLD AUTO: 4.14 M/UL (ref 4.6–6.2)
RBC # BLD AUTO: 4.14 M/UL (ref 4.6–6.2)
RBC # BLD AUTO: 4.16 M/UL (ref 4.6–6.2)
RBC # BLD AUTO: 4.16 M/UL (ref 4.6–6.2)
RBC # BLD AUTO: 4.17 M/UL (ref 4.6–6.2)
RBC # BLD AUTO: 4.18 M/UL (ref 4.6–6.2)
RBC # BLD AUTO: 4.19 M/UL (ref 4.6–6.2)
RBC # BLD AUTO: 4.19 M/UL (ref 4.6–6.2)
RBC # BLD AUTO: 4.22 M/UL (ref 4.6–6.2)
RBC # BLD AUTO: 4.23 M/UL (ref 4.6–6.2)
RBC # BLD AUTO: 4.25 M/UL (ref 4.6–6.2)
RBC # BLD AUTO: 4.28 M/UL (ref 4.6–6.2)
RBC # BLD AUTO: 4.3 M/UL (ref 4.6–6.2)
RBC # BLD AUTO: 4.3 M/UL (ref 4.6–6.2)
RBC # BLD AUTO: 4.31 M/UL (ref 4.6–6.2)
RBC # BLD AUTO: 4.35 M/UL (ref 4.6–6.2)
RBC # BLD AUTO: 4.36 M/UL (ref 4.6–6.2)
RBC # BLD AUTO: 4.39 M/UL (ref 4.6–6.2)
RBC # BLD AUTO: 4.4 M/UL (ref 4.6–6.2)
RBC # BLD AUTO: 4.41 M/UL (ref 4.6–6.2)
RBC # BLD AUTO: 4.43 M/UL (ref 4.6–6.2)
RBC # BLD AUTO: 4.44 M/UL (ref 4.6–6.2)
RBC # BLD AUTO: 4.44 M/UL (ref 4.6–6.2)
RBC # BLD AUTO: 4.45 M/UL (ref 4.6–6.2)
RBC # BLD AUTO: 4.46 M/UL (ref 4.6–6.2)
RBC # BLD AUTO: 4.47 M/UL (ref 4.6–6.2)
RBC # BLD AUTO: 4.49 M/UL (ref 4.6–6.2)
RBC # BLD AUTO: 4.5 M/UL (ref 4.6–6.2)
RBC # BLD AUTO: 4.54 M/UL (ref 4.6–6.2)
RBC # BLD AUTO: 4.55 M/UL (ref 4.6–6.2)
RBC # BLD AUTO: 4.57 M/UL (ref 4.6–6.2)
RBC # BLD AUTO: 4.58 M/UL (ref 4.6–6.2)
RBC # BLD AUTO: 4.6 M/UL (ref 4.6–6.2)
RBC # BLD AUTO: 4.61 M/UL (ref 4.6–6.2)
RBC # BLD AUTO: 4.61 M/UL (ref 4.6–6.2)
RBC # BLD AUTO: 4.62 M/UL (ref 4.6–6.2)
RBC # BLD AUTO: 4.63 M/UL (ref 4.6–6.2)
RBC # BLD AUTO: 4.64 M/UL (ref 4.6–6.2)
RBC # BLD AUTO: 4.66 M/UL (ref 4.6–6.2)
RBC # BLD AUTO: 4.66 M/UL (ref 4.6–6.2)
RBC # BLD AUTO: 4.69 M/UL (ref 4.6–6.2)
RBC # BLD AUTO: 4.74 M/UL (ref 4.6–6.2)
RBC # BLD AUTO: 4.76 M/UL (ref 4.6–6.2)
RBC # BLD AUTO: 4.8 M/UL (ref 4.6–6.2)
RBC # BLD AUTO: 4.82 M/UL (ref 4.6–6.2)
RBC # BLD AUTO: 4.84 M/UL (ref 4.6–6.2)
RBC # BLD AUTO: 4.84 M/UL (ref 4.6–6.2)
RBC # BLD AUTO: 4.86 M/UL (ref 4.6–6.2)
RBC # BLD AUTO: 4.95 M/UL (ref 4.6–6.2)
RBC # BLD AUTO: 4.97 M/UL (ref 4.6–6.2)
RBC # BLD AUTO: 5 M/UL (ref 4.6–6.2)
RBC # BLD AUTO: 5.02 M/UL (ref 4.6–6.2)
RBC # BLD AUTO: 5.4 M/UL (ref 4.6–6.2)
RBC # BLD AUTO: 5.43 M/UL (ref 4.6–6.2)
RBC # BLD AUTO: 5.52 M/UL (ref 4.6–6.2)
RIGHT VENTRICULAR END-DIASTOLIC DIMENSION: 3 CM
RIGHT VENTRICULAR END-DIASTOLIC DIMENSION: 3.54 CM
RIGHT VENTRICULAR END-DIASTOLIC DIMENSION: 4.4 CM
RIGHT VENTRICULAR END-DIASTOLIC DIMENSION: 5.3 CM
RV TISSUE DOPPLER FREE WALL SYSTOLIC VELOCITY 1 (APICAL 4 CHAMBER VIEW): 7.66 CM/S
SAMPLE: ABNORMAL
SARS-COV-2 RDRP RESP QL NAA+PROBE: NEGATIVE
SATURATED IRON: 15 % (ref 20–50)
SCHISTOCYTES BLD QL SMEAR: ABNORMAL
SCHISTOCYTES BLD QL SMEAR: PRESENT
SED RATE (WESTERGREN): 42 MM/HR
SED RATE (WESTERGREN): 58 MM/HR
SED RATE (WESTERGREN): 77
SINUS: 3.05 CM
SINUS: 3.28 CM
SINUS: 3.37 CM
SITE: ABNORMAL
SODIUM BLD-SCNC: 125 MMOL/L (ref 136–145)
SODIUM BLD-SCNC: 133 MMOL/L (ref 136–145)
SODIUM BLD-SCNC: 138 MMOL/L (ref 136–145)
SODIUM SERPL-SCNC: 125 MMOL/L (ref 136–145)
SODIUM SERPL-SCNC: 125 MMOL/L (ref 136–145)
SODIUM SERPL-SCNC: 126 MMOL/L (ref 136–145)
SODIUM SERPL-SCNC: 127 MMOL/L (ref 136–145)
SODIUM SERPL-SCNC: 128 MMOL/L (ref 136–145)
SODIUM SERPL-SCNC: 129 MMOL/L (ref 136–145)
SODIUM SERPL-SCNC: 130 MMOL/L (ref 136–145)
SODIUM SERPL-SCNC: 131 MMOL/L (ref 136–145)
SODIUM SERPL-SCNC: 132 MMOL/L (ref 136–145)
SODIUM SERPL-SCNC: 133 MMOL/L (ref 136–145)
SODIUM SERPL-SCNC: 134 MMOL/L (ref 136–145)
SODIUM SERPL-SCNC: 135 MMOL/L (ref 136–145)
SODIUM SERPL-SCNC: 136 MMOL/L (ref 136–145)
SODIUM SERPL-SCNC: 137 MMOL/L (ref 136–145)
SODIUM SERPL-SCNC: 138 MMOL/L (ref 136–145)
SODIUM SERPL-SCNC: 139 MMOL/L (ref 136–145)
SODIUM SERPL-SCNC: 140 MMOL/L (ref 136–145)
SODIUM SERPL-SCNC: 142 MMOL/L (ref 136–145)
SODIUM SERPL-SCNC: 143 MMOL/L (ref 136–145)
SODIUM SERPL-SCNC: 144 MMOL/L (ref 136–145)
SODIUM SERPL-SCNC: 144 MMOL/L (ref 136–145)
SODIUM UR-SCNC: 91 MMOL/L (ref 20–250)
SODIUM: 137 MEQ/L (ref 137–145)
SODIUM: 138 MEQ/L (ref 137–145)
SP02: 92
STJ: 2.59 CM
STJ: 2.78 CM
STJ: 3.03 CM
STRONGYLOIDES ANTIBODY IGG: NEGATIVE
TDI LATERAL: 0.09 M/S
TDI SEPTAL: 0.09 M/S
TDI: 0.09 M/S
TOTAL IRON BINDING CAPACITY: 212 UG/DL (ref 250–450)
TR MAX PG: 19 MMHG
TR MAX PG: 25 MMHG
TRANSFERRIN SERPL-MCNC: 143 MG/DL (ref 200–375)
TRICUSPID ANNULAR PLANE SYSTOLIC EXCURSION: 1.09 CM
TRIGL SERPL-MCNC: 163 MG/DL (ref 30–150)
TROPONIN I SERPL DL<=0.01 NG/ML-MCNC: 0.01 NG/ML (ref 0–0.03)
TSH SERPL DL<=0.005 MIU/L-ACNC: 0.98 UIU/ML (ref 0.4–4)
TV REST PULMONARY ARTERY PRESSURE: 27 MMHG
TV REST PULMONARY ARTERY PRESSURE: 28 MMHG
URATE SERPL-MCNC: 3.4 MG/DL (ref 3.4–7)
VANCOMYCIN SERPL-MCNC: 13.2 UG/ML
VANCOMYCIN SERPL-MCNC: 13.3 UG/ML
VANCOMYCIN SERPL-MCNC: 17.7 UG/ML
VANCOMYCIN SERPL-MCNC: 6.8 UG/ML
VANCOMYCIN TROUGH SERPL-MCNC: 13.3 UG/ML (ref 10–22)
VANCOMYCIN TROUGH SERPL-MCNC: 14.3 UG/ML (ref 10–22)
VANCOMYCIN TROUGH SERPL-MCNC: 14.4 UG/ML (ref 10–22)
VANCOMYCIN TROUGH SERPL-MCNC: 16.2 UG/ML (ref 10–22)
VANCOMYCIN TROUGH SERPL-MCNC: 17.9 UG/ML (ref 10–22)
VANCOMYCIN TROUGH SERPL-MCNC: 18.3 UG/ML (ref 10–22)
VANCOMYCIN TROUGH SERPL-MCNC: 18.4 UG/ML (ref 10–22)
VANCOMYCIN TROUGH SERPL-MCNC: 18.9 UG/ML (ref 10–22)
VANCOMYCIN TROUGH SERPL-MCNC: 20.1 UG/ML (ref 10–22)
VANCOMYCIN TROUGH SERPL-MCNC: 21 UG/ML (ref 10–22)
VANCOMYCIN TROUGH SERPL-MCNC: 22.5 UG/ML (ref 10–22)
VANCOMYCIN TROUGH SERPL-MCNC: 25.7 UG/ML (ref 10–22)
VANCOMYCIN TROUGH SERPL-MCNC: 27.6 UG/ML (ref 10–22)
VANCOMYCIN TROUGH SERPL-MCNC: 28.2 UG/ML (ref 10–22)
VANCOMYCIN TROUGH SERPL-MCNC: 28.6 UG/ML (ref 10–22)
VANCOMYCIN TROUGH SERPL-MCNC: 8 UG/ML (ref 10–22)
VANCOMYCIN TROUGH SERPL-MCNC: <1.1 UG/ML (ref 10–22)
VANCOMYCIN TROUGH: 13.2 UG/ML (ref 15–20)
WBC # BLD AUTO: 10.34 K/UL (ref 3.9–12.7)
WBC # BLD AUTO: 10.45 K/UL (ref 3.9–12.7)
WBC # BLD AUTO: 10.7 K/UL (ref 3.9–12.7)
WBC # BLD AUTO: 10.89 K/UL (ref 3.9–12.7)
WBC # BLD AUTO: 11.13 K/UL (ref 3.9–12.7)
WBC # BLD AUTO: 11.63 K/UL (ref 3.9–12.7)
WBC # BLD AUTO: 11.95 K/UL (ref 3.9–12.7)
WBC # BLD AUTO: 12.03 K/UL (ref 3.9–12.7)
WBC # BLD AUTO: 12.05 K/UL (ref 3.9–12.7)
WBC # BLD AUTO: 12.09 K/UL (ref 3.9–12.7)
WBC # BLD AUTO: 12.47 K/UL (ref 3.9–12.7)
WBC # BLD AUTO: 12.52 K/UL (ref 3.9–12.7)
WBC # BLD AUTO: 12.62 K/UL (ref 3.9–12.7)
WBC # BLD AUTO: 12.63 K/UL (ref 3.9–12.7)
WBC # BLD AUTO: 12.65 K/UL (ref 3.9–12.7)
WBC # BLD AUTO: 12.89 K/UL (ref 3.9–12.7)
WBC # BLD AUTO: 12.91 K/UL (ref 3.9–12.7)
WBC # BLD AUTO: 13.24 K/UL (ref 3.9–12.7)
WBC # BLD AUTO: 13.36 K/UL (ref 3.9–12.7)
WBC # BLD AUTO: 13.62 K/UL (ref 3.9–12.7)
WBC # BLD AUTO: 13.65 K/UL (ref 3.9–12.7)
WBC # BLD AUTO: 13.71 K/UL (ref 3.9–12.7)
WBC # BLD AUTO: 13.75 K/UL (ref 3.9–12.7)
WBC # BLD AUTO: 13.8 K/UL (ref 3.9–12.7)
WBC # BLD AUTO: 13.84 K/UL (ref 3.9–12.7)
WBC # BLD AUTO: 13.86 K/UL (ref 3.9–12.7)
WBC # BLD AUTO: 13.86 K/UL (ref 3.9–12.7)
WBC # BLD AUTO: 13.92 K/UL (ref 3.9–12.7)
WBC # BLD AUTO: 13.92 K/UL (ref 3.9–12.7)
WBC # BLD AUTO: 14.02 K/UL (ref 3.9–12.7)
WBC # BLD AUTO: 14.07 K/UL (ref 3.9–12.7)
WBC # BLD AUTO: 14.16 K/UL (ref 3.9–12.7)
WBC # BLD AUTO: 14.28 K/UL (ref 3.9–12.7)
WBC # BLD AUTO: 14.28 K/UL (ref 3.9–12.7)
WBC # BLD AUTO: 14.29 K/UL (ref 3.9–12.7)
WBC # BLD AUTO: 14.33 K/UL (ref 3.9–12.7)
WBC # BLD AUTO: 14.35 K/UL (ref 3.9–12.7)
WBC # BLD AUTO: 14.36 K/UL (ref 3.9–12.7)
WBC # BLD AUTO: 14.41 K/UL (ref 3.9–12.7)
WBC # BLD AUTO: 14.44 K/UL (ref 3.9–12.7)
WBC # BLD AUTO: 14.49 K/UL (ref 3.9–12.7)
WBC # BLD AUTO: 14.59 K/UL (ref 3.9–12.7)
WBC # BLD AUTO: 15.11 K/UL (ref 3.9–12.7)
WBC # BLD AUTO: 15.61 K/UL (ref 3.9–12.7)
WBC # BLD AUTO: 15.72 K/UL (ref 3.9–12.7)
WBC # BLD AUTO: 16 K/UL (ref 3.9–12.7)
WBC # BLD AUTO: 16.35 K/UL (ref 3.9–12.7)
WBC # BLD AUTO: 17 K/UL (ref 3.9–12.7)
WBC # BLD AUTO: 17.95 K/UL (ref 3.9–12.7)
WBC # BLD AUTO: 18 K/UL (ref 3.9–12.7)
WBC # BLD AUTO: 19.95 K/UL (ref 3.9–12.7)
WBC # BLD AUTO: 19.97 K/UL (ref 3.9–12.7)
WBC # BLD AUTO: 20.33 K/UL (ref 3.9–12.7)
WBC # BLD AUTO: 21.69 K/UL (ref 3.9–12.7)
WBC # BLD AUTO: 21.89 K/UL (ref 3.9–12.7)
WBC # BLD AUTO: 23.54 K/UL (ref 3.9–12.7)
WBC # BLD AUTO: 29.92 K/UL (ref 3.9–12.7)
WBC # BLD AUTO: 7.89 K/UL (ref 3.9–12.7)
WBC # BLD AUTO: 7.98 K/UL (ref 3.9–12.7)
WBC # BLD AUTO: 8.31 K/UL (ref 3.9–12.7)
WBC # BLD AUTO: 8.34 K/UL (ref 3.9–12.7)
WBC # BLD AUTO: 8.55 K/UL (ref 3.9–12.7)
WBC # BLD AUTO: 8.64 K/UL (ref 3.9–12.7)
WBC # BLD AUTO: 8.78 K/UL (ref 3.9–12.7)
WBC # BLD AUTO: 8.79 K/UL (ref 3.9–12.7)
WBC # BLD AUTO: 9.21 K/UL (ref 3.9–12.7)
WBC # BLD AUTO: 9.23 K/UL (ref 3.9–12.7)
WBC # BLD AUTO: 9.56 K/UL (ref 3.9–12.7)
WBC # BLD AUTO: 9.62 K/UL (ref 3.9–12.7)
WBC # BLD AUTO: 9.85 K/UL (ref 3.9–12.7)
WBC # BLD AUTO: 9.89 K/UL (ref 3.9–12.7)
WBC # BLD AUTO: 9.9 K/UL (ref 3.9–12.7)
WBC # BLD AUTO: 9.9 K/UL (ref 3.9–12.7)
WBC # BLD AUTO: 9.92 K/UL (ref 3.9–12.7)
WBC # BLD AUTO: 9.92 K/UL (ref 3.9–12.7)
WBC #/AREA STL HPF: NORMAL /[HPF]
WBC: 10.9 K/UL (ref 4.8–10.8)
WBC: 11.2 K/UL (ref 4.8–10.8)

## 2021-01-01 PROCEDURE — 83735 ASSAY OF MAGNESIUM: CPT | Performed by: PHYSICIAN ASSISTANT

## 2021-01-01 PROCEDURE — 36415 COLL VENOUS BLD VENIPUNCTURE: CPT | Performed by: STUDENT IN AN ORGANIZED HEALTH CARE EDUCATION/TRAINING PROGRAM

## 2021-01-01 PROCEDURE — 99232 PR SUBSEQUENT HOSPITAL CARE,LEVL II: ICD-10-PCS | Mod: ,,, | Performed by: INTERNAL MEDICINE

## 2021-01-01 PROCEDURE — 85025 COMPLETE CBC W/AUTO DIFF WBC: CPT

## 2021-01-01 PROCEDURE — 63600175 PHARM REV CODE 636 W HCPCS: Performed by: PHYSICIAN ASSISTANT

## 2021-01-01 PROCEDURE — A4217 STERILE WATER/SALINE, 500 ML: HCPCS | Performed by: NURSE PRACTITIONER

## 2021-01-01 PROCEDURE — 80053 COMPREHEN METABOLIC PANEL: CPT | Performed by: PHYSICIAN ASSISTANT

## 2021-01-01 PROCEDURE — 93750 PR INTERROGATE VENT ASSIST DEV, IN PERSON, W PHYSICIAN ANALYSIS: ICD-10-PCS | Mod: ,,, | Performed by: INTERNAL MEDICINE

## 2021-01-01 PROCEDURE — 94761 N-INVAS EAR/PLS OXIMETRY MLT: CPT

## 2021-01-01 PROCEDURE — 93793 PR ANTICOAGULANT MGMT FOR PT TAKING WARFARIN: ICD-10-PCS | Mod: S$GLB,,,

## 2021-01-01 PROCEDURE — 99223 1ST HOSP IP/OBS HIGH 75: CPT | Mod: ,,, | Performed by: PHYSICIAN ASSISTANT

## 2021-01-01 PROCEDURE — 99233 SBSQ HOSP IP/OBS HIGH 50: CPT | Mod: ,,, | Performed by: NEUROLOGICAL SURGERY

## 2021-01-01 PROCEDURE — 63600175 PHARM REV CODE 636 W HCPCS: Performed by: STUDENT IN AN ORGANIZED HEALTH CARE EDUCATION/TRAINING PROGRAM

## 2021-01-01 PROCEDURE — 99233 PR SUBSEQUENT HOSPITAL CARE,LEVL III: ICD-10-PCS | Mod: 25,,, | Performed by: INTERNAL MEDICINE

## 2021-01-01 PROCEDURE — 99233 SBSQ HOSP IP/OBS HIGH 50: CPT | Mod: ,,, | Performed by: INTERNAL MEDICINE

## 2021-01-01 PROCEDURE — 63600175 PHARM REV CODE 636 W HCPCS: Performed by: INTERNAL MEDICINE

## 2021-01-01 PROCEDURE — 99233 PR SUBSEQUENT HOSPITAL CARE,LEVL III: ICD-10-PCS | Mod: ,,, | Performed by: PHYSICIAN ASSISTANT

## 2021-01-01 PROCEDURE — 99205 PR OFFICE/OUTPT VISIT, NEW, LEVL V, 60-74 MIN: ICD-10-PCS | Mod: S$GLB,,, | Performed by: STUDENT IN AN ORGANIZED HEALTH CARE EDUCATION/TRAINING PROGRAM

## 2021-01-01 PROCEDURE — 85730 THROMBOPLASTIN TIME PARTIAL: CPT | Performed by: STUDENT IN AN ORGANIZED HEALTH CARE EDUCATION/TRAINING PROGRAM

## 2021-01-01 PROCEDURE — 83735 ASSAY OF MAGNESIUM: CPT | Performed by: STUDENT IN AN ORGANIZED HEALTH CARE EDUCATION/TRAINING PROGRAM

## 2021-01-01 PROCEDURE — 80076 HEPATIC FUNCTION PANEL: CPT | Performed by: INTERNAL MEDICINE

## 2021-01-01 PROCEDURE — 84100 ASSAY OF PHOSPHORUS: CPT | Performed by: PHYSICIAN ASSISTANT

## 2021-01-01 PROCEDURE — 99233 PR SUBSEQUENT HOSPITAL CARE,LEVL III: ICD-10-PCS | Mod: GC,,, | Performed by: STUDENT IN AN ORGANIZED HEALTH CARE EDUCATION/TRAINING PROGRAM

## 2021-01-01 PROCEDURE — 85730 THROMBOPLASTIN TIME PARTIAL: CPT | Performed by: INTERNAL MEDICINE

## 2021-01-01 PROCEDURE — 86140 C-REACTIVE PROTEIN: CPT | Performed by: INTERNAL MEDICINE

## 2021-01-01 PROCEDURE — 25000003 PHARM REV CODE 250: Performed by: INTERNAL MEDICINE

## 2021-01-01 PROCEDURE — 99291 CRITICAL CARE FIRST HOUR: CPT | Mod: ,,, | Performed by: PHYSICIAN ASSISTANT

## 2021-01-01 PROCEDURE — 37000009 HC ANESTHESIA EA ADD 15 MINS

## 2021-01-01 PROCEDURE — 99900035 HC TECH TIME PER 15 MIN (STAT)

## 2021-01-01 PROCEDURE — 99214 OFFICE O/P EST MOD 30 MIN: CPT | Mod: S$GLB,,, | Performed by: INTERNAL MEDICINE

## 2021-01-01 PROCEDURE — 96374 THER/PROPH/DIAG INJ IV PUSH: CPT

## 2021-01-01 PROCEDURE — 33244 PR RMV PACER/ELECTRD,TRANSVEN EXTRCT: ICD-10-PCS | Mod: ,,, | Performed by: INTERNAL MEDICINE

## 2021-01-01 PROCEDURE — 80048 BASIC METABOLIC PNL TOTAL CA: CPT | Performed by: NURSE PRACTITIONER

## 2021-01-01 PROCEDURE — 80048 BASIC METABOLIC PNL TOTAL CA: CPT | Performed by: INTERNAL MEDICINE

## 2021-01-01 PROCEDURE — 84295 ASSAY OF SERUM SODIUM: CPT | Performed by: PHYSICIAN ASSISTANT

## 2021-01-01 PROCEDURE — 85730 THROMBOPLASTIN TIME PARTIAL: CPT | Mod: 91 | Performed by: INTERNAL MEDICINE

## 2021-01-01 PROCEDURE — 27000248 HC VAD-ADDITIONAL DAY

## 2021-01-01 PROCEDURE — 93793 ANTICOAG MGMT PT WARFARIN: CPT | Mod: S$GLB,,,

## 2021-01-01 PROCEDURE — 84100 ASSAY OF PHOSPHORUS: CPT | Performed by: INTERNAL MEDICINE

## 2021-01-01 PROCEDURE — 99223 PR INITIAL HOSPITAL CARE,LEVL III: ICD-10-PCS | Mod: ,,, | Performed by: PHYSICIAN ASSISTANT

## 2021-01-01 PROCEDURE — 86920 COMPATIBILITY TEST SPIN: CPT | Performed by: INTERNAL MEDICINE

## 2021-01-01 PROCEDURE — 25000003 PHARM REV CODE 250: Performed by: PHYSICIAN ASSISTANT

## 2021-01-01 PROCEDURE — 25000003 PHARM REV CODE 250: Performed by: STUDENT IN AN ORGANIZED HEALTH CARE EDUCATION/TRAINING PROGRAM

## 2021-01-01 PROCEDURE — 83880 ASSAY OF NATRIURETIC PEPTIDE: CPT | Performed by: PHYSICIAN ASSISTANT

## 2021-01-01 PROCEDURE — A4216 STERILE WATER/SALINE, 10 ML: HCPCS | Performed by: NURSE ANESTHETIST, CERTIFIED REGISTERED

## 2021-01-01 PROCEDURE — 99284 EMERGENCY DEPT VISIT MOD MDM: CPT | Mod: ,,, | Performed by: EMERGENCY MEDICINE

## 2021-01-01 PROCEDURE — 93750 INTERROGATION VAD IN PERSON: CPT | Mod: ,,, | Performed by: INTERNAL MEDICINE

## 2021-01-01 PROCEDURE — 86140 C-REACTIVE PROTEIN: CPT | Mod: PO | Performed by: INTERNAL MEDICINE

## 2021-01-01 PROCEDURE — A4217 STERILE WATER/SALINE, 500 ML: HCPCS | Performed by: PSYCHIATRY & NEUROLOGY

## 2021-01-01 PROCEDURE — 83615 LACTATE (LD) (LDH) ENZYME: CPT | Performed by: PHYSICIAN ASSISTANT

## 2021-01-01 PROCEDURE — 36415 COLL VENOUS BLD VENIPUNCTURE: CPT | Performed by: INTERNAL MEDICINE

## 2021-01-01 PROCEDURE — 99291 CRITICAL CARE FIRST HOUR: CPT | Mod: ,,, | Performed by: NURSE PRACTITIONER

## 2021-01-01 PROCEDURE — 83880 ASSAY OF NATRIURETIC PEPTIDE: CPT | Performed by: STUDENT IN AN ORGANIZED HEALTH CARE EDUCATION/TRAINING PROGRAM

## 2021-01-01 PROCEDURE — 25000003 PHARM REV CODE 250: Performed by: NURSE PRACTITIONER

## 2021-01-01 PROCEDURE — G0179 PR HOME HEALTH MD RECERTIFICATION: ICD-10-PCS | Mod: ,,, | Performed by: INTERNAL MEDICINE

## 2021-01-01 PROCEDURE — 20600001 HC STEP DOWN PRIVATE ROOM

## 2021-01-01 PROCEDURE — 87077 CULTURE AEROBIC IDENTIFY: CPT | Performed by: PHYSICIAN ASSISTANT

## 2021-01-01 PROCEDURE — 99233 PR SUBSEQUENT HOSPITAL CARE,LEVL III: ICD-10-PCS | Mod: ,,, | Performed by: NURSE PRACTITIONER

## 2021-01-01 PROCEDURE — 80048 BASIC METABOLIC PNL TOTAL CA: CPT

## 2021-01-01 PROCEDURE — 76705 US ABDOMEN LIMITED: ICD-10-PCS | Mod: 26,,, | Performed by: RADIOLOGY

## 2021-01-01 PROCEDURE — 99233 SBSQ HOSP IP/OBS HIGH 50: CPT | Mod: GC,,, | Performed by: INTERNAL MEDICINE

## 2021-01-01 PROCEDURE — 87040 BLOOD CULTURE FOR BACTERIA: CPT

## 2021-01-01 PROCEDURE — 63600175 PHARM REV CODE 636 W HCPCS: Performed by: NURSE ANESTHETIST, CERTIFIED REGISTERED

## 2021-01-01 PROCEDURE — 99233 PR SUBSEQUENT HOSPITAL CARE,LEVL III: ICD-10-PCS | Mod: 95,,, | Performed by: NURSE PRACTITIONER

## 2021-01-01 PROCEDURE — 84134 ASSAY OF PREALBUMIN: CPT | Performed by: INTERNAL MEDICINE

## 2021-01-01 PROCEDURE — A4217 STERILE WATER/SALINE, 500 ML: HCPCS | Performed by: PHYSICIAN ASSISTANT

## 2021-01-01 PROCEDURE — 93010 EKG 12-LEAD: ICD-10-PCS | Mod: ,,, | Performed by: INTERNAL MEDICINE

## 2021-01-01 PROCEDURE — 80053 COMPREHEN METABOLIC PANEL: CPT | Performed by: INTERNAL MEDICINE

## 2021-01-01 PROCEDURE — 99233 SBSQ HOSP IP/OBS HIGH 50: CPT | Mod: ,,, | Performed by: NURSE PRACTITIONER

## 2021-01-01 PROCEDURE — 84132 ASSAY OF SERUM POTASSIUM: CPT | Mod: 91 | Performed by: INTERNAL MEDICINE

## 2021-01-01 PROCEDURE — 63600175 PHARM REV CODE 636 W HCPCS: Mod: JG | Performed by: STUDENT IN AN ORGANIZED HEALTH CARE EDUCATION/TRAINING PROGRAM

## 2021-01-01 PROCEDURE — 85025 COMPLETE CBC W/AUTO DIFF WBC: CPT | Performed by: PHYSICIAN ASSISTANT

## 2021-01-01 PROCEDURE — 86140 C-REACTIVE PROTEIN: CPT

## 2021-01-01 PROCEDURE — 20000000 HC ICU ROOM

## 2021-01-01 PROCEDURE — 99291 PR CRITICAL CARE, E/M 30-74 MINUTES: ICD-10-PCS | Mod: 25,,, | Performed by: INTERNAL MEDICINE

## 2021-01-01 PROCEDURE — 87186 SC STD MICRODIL/AGAR DIL: CPT | Performed by: PHYSICIAN ASSISTANT

## 2021-01-01 PROCEDURE — 84100 ASSAY OF PHOSPHORUS: CPT

## 2021-01-01 PROCEDURE — 63600175 PHARM REV CODE 636 W HCPCS: Performed by: PSYCHIATRY & NEUROLOGY

## 2021-01-01 PROCEDURE — 99291 CRITICAL CARE FIRST HOUR: CPT | Mod: ICN,,, | Performed by: PHYSICIAN ASSISTANT

## 2021-01-01 PROCEDURE — 93005 ELECTROCARDIOGRAM TRACING: CPT

## 2021-01-01 PROCEDURE — 83880 ASSAY OF NATRIURETIC PEPTIDE: CPT | Mod: PO | Performed by: INTERNAL MEDICINE

## 2021-01-01 PROCEDURE — A4216 STERILE WATER/SALINE, 10 ML: HCPCS | Performed by: INTERNAL MEDICINE

## 2021-01-01 PROCEDURE — 25000003 PHARM REV CODE 250: Performed by: EMERGENCY MEDICINE

## 2021-01-01 PROCEDURE — 85025 COMPLETE CBC W/AUTO DIFF WBC: CPT | Performed by: NURSE PRACTITIONER

## 2021-01-01 PROCEDURE — 63600175 PHARM REV CODE 636 W HCPCS: Performed by: RADIOLOGY

## 2021-01-01 PROCEDURE — 63600175 PHARM REV CODE 636 W HCPCS: Performed by: EMERGENCY MEDICINE

## 2021-01-01 PROCEDURE — 85610 PROTHROMBIN TIME: CPT | Performed by: PHYSICIAN ASSISTANT

## 2021-01-01 PROCEDURE — 84134 ASSAY OF PREALBUMIN: CPT | Performed by: PHYSICIAN ASSISTANT

## 2021-01-01 PROCEDURE — 99233 SBSQ HOSP IP/OBS HIGH 50: CPT | Mod: ,,, | Performed by: PHYSICIAN ASSISTANT

## 2021-01-01 PROCEDURE — 63600175 PHARM REV CODE 636 W HCPCS: Performed by: HOSPITALIST

## 2021-01-01 PROCEDURE — 99223 PR INITIAL HOSPITAL CARE,LEVL III: ICD-10-PCS | Mod: ,,, | Performed by: CLINICAL NURSE SPECIALIST

## 2021-01-01 PROCEDURE — 83880 ASSAY OF NATRIURETIC PEPTIDE: CPT

## 2021-01-01 PROCEDURE — 97530 THERAPEUTIC ACTIVITIES: CPT

## 2021-01-01 PROCEDURE — 99232 PR SUBSEQUENT HOSPITAL CARE,LEVL II: ICD-10-PCS | Mod: ,,, | Performed by: PHYSICIAN ASSISTANT

## 2021-01-01 PROCEDURE — 4010F PR ACE/ARB THEARPY RXD/TAKEN: ICD-10-PCS | Mod: CPTII,S$GLB,, | Performed by: STUDENT IN AN ORGANIZED HEALTH CARE EDUCATION/TRAINING PROGRAM

## 2021-01-01 PROCEDURE — 85730 THROMBOPLASTIN TIME PARTIAL: CPT | Performed by: PHYSICIAN ASSISTANT

## 2021-01-01 PROCEDURE — 99499 UNLISTED E&M SERVICE: CPT | Mod: ,,, | Performed by: PHYSICIAN ASSISTANT

## 2021-01-01 PROCEDURE — D9220A PRA ANESTHESIA: ICD-10-PCS | Mod: ANES,,, | Performed by: ANESTHESIOLOGY

## 2021-01-01 PROCEDURE — 87070 CULTURE OTHR SPECIMN AEROBIC: CPT | Performed by: INTERNAL MEDICINE

## 2021-01-01 PROCEDURE — 99291 CRITICAL CARE FIRST HOUR: CPT | Mod: 25,,, | Performed by: INTERNAL MEDICINE

## 2021-01-01 PROCEDURE — 83615 LACTATE (LD) (LDH) ENZYME: CPT

## 2021-01-01 PROCEDURE — 99233 PR SUBSEQUENT HOSPITAL CARE,LEVL III: ICD-10-PCS | Mod: 24,25,, | Performed by: THORACIC SURGERY (CARDIOTHORACIC VASCULAR SURGERY)

## 2021-01-01 PROCEDURE — 83615 LACTATE (LD) (LDH) ENZYME: CPT | Performed by: INTERNAL MEDICINE

## 2021-01-01 PROCEDURE — 80202 ASSAY OF VANCOMYCIN: CPT | Performed by: STUDENT IN AN ORGANIZED HEALTH CARE EDUCATION/TRAINING PROGRAM

## 2021-01-01 PROCEDURE — 85730 THROMBOPLASTIN TIME PARTIAL: CPT | Mod: 91 | Performed by: STUDENT IN AN ORGANIZED HEALTH CARE EDUCATION/TRAINING PROGRAM

## 2021-01-01 PROCEDURE — 99233 SBSQ HOSP IP/OBS HIGH 50: CPT | Mod: GC,,, | Performed by: PSYCHIATRY & NEUROLOGY

## 2021-01-01 PROCEDURE — 80047 BASIC METABLC PNL IONIZED CA: CPT

## 2021-01-01 PROCEDURE — 36415 COLL VENOUS BLD VENIPUNCTURE: CPT | Performed by: PHYSICIAN ASSISTANT

## 2021-01-01 PROCEDURE — 99223 1ST HOSP IP/OBS HIGH 75: CPT | Mod: ,,, | Performed by: NURSE PRACTITIONER

## 2021-01-01 PROCEDURE — 82550 ASSAY OF CK (CPK): CPT

## 2021-01-01 PROCEDURE — 86140 C-REACTIVE PROTEIN: CPT | Performed by: PHYSICIAN ASSISTANT

## 2021-01-01 PROCEDURE — 80202 ASSAY OF VANCOMYCIN: CPT | Performed by: INTERNAL MEDICINE

## 2021-01-01 PROCEDURE — 99285 EMERGENCY DEPT VISIT HI MDM: CPT | Mod: 25

## 2021-01-01 PROCEDURE — 85610 PROTHROMBIN TIME: CPT | Performed by: EMERGENCY MEDICINE

## 2021-01-01 PROCEDURE — 99233 PR SUBSEQUENT HOSPITAL CARE,LEVL III: ICD-10-PCS | Mod: ,,, | Performed by: NEUROLOGICAL SURGERY

## 2021-01-01 PROCEDURE — 99223 PR INITIAL HOSPITAL CARE,LEVL III: ICD-10-PCS | Mod: GC,,, | Performed by: INTERNAL MEDICINE

## 2021-01-01 PROCEDURE — 99284 PR EMERGENCY DEPT VISIT,LEVEL IV: ICD-10-PCS | Mod: CS,,, | Performed by: EMERGENCY MEDICINE

## 2021-01-01 PROCEDURE — 27000207 HC ISOLATION

## 2021-01-01 PROCEDURE — 93750 OP LVAD INTERROGATION: ICD-10-PCS | Mod: S$GLB,,, | Performed by: INTERNAL MEDICINE

## 2021-01-01 PROCEDURE — 83735 ASSAY OF MAGNESIUM: CPT

## 2021-01-01 PROCEDURE — 99232 PR SUBSEQUENT HOSPITAL CARE,LEVL II: ICD-10-PCS | Mod: GC,,, | Performed by: NEUROLOGICAL SURGERY

## 2021-01-01 PROCEDURE — 1159F PR MEDICATION LIST DOCUMENTED IN MEDICAL RECORD: ICD-10-PCS | Mod: CPTII,95,, | Performed by: STUDENT IN AN ORGANIZED HEALTH CARE EDUCATION/TRAINING PROGRAM

## 2021-01-01 PROCEDURE — 25000003 PHARM REV CODE 250: Performed by: NURSE ANESTHETIST, CERTIFIED REGISTERED

## 2021-01-01 PROCEDURE — 82803 BLOOD GASES ANY COMBINATION: CPT

## 2021-01-01 PROCEDURE — 27000685 HC LVAD KIT (30 DAY SUPPLY)

## 2021-01-01 PROCEDURE — 84132 ASSAY OF SERUM POTASSIUM: CPT

## 2021-01-01 PROCEDURE — A4217 STERILE WATER/SALINE, 500 ML: HCPCS | Performed by: INTERNAL MEDICINE

## 2021-01-01 PROCEDURE — 85610 PROTHROMBIN TIME: CPT | Performed by: INTERNAL MEDICINE

## 2021-01-01 PROCEDURE — 97116 GAIT TRAINING THERAPY: CPT

## 2021-01-01 PROCEDURE — 87040 BLOOD CULTURE FOR BACTERIA: CPT | Mod: 59 | Performed by: PHYSICIAN ASSISTANT

## 2021-01-01 PROCEDURE — D9220A PRA ANESTHESIA: Mod: CRNA,,, | Performed by: NURSE ANESTHETIST, CERTIFIED REGISTERED

## 2021-01-01 PROCEDURE — 99285 PR EMERGENCY DEPT VISIT,LEVEL V: ICD-10-PCS | Mod: GC,CS,, | Performed by: EMERGENCY MEDICINE

## 2021-01-01 PROCEDURE — 99232 PR SUBSEQUENT HOSPITAL CARE,LEVL II: ICD-10-PCS | Mod: GC,,, | Performed by: PSYCHIATRY & NEUROLOGY

## 2021-01-01 PROCEDURE — 99214 PR OFFICE/OUTPT VISIT, EST, LEVL IV, 30-39 MIN: ICD-10-PCS | Mod: S$GLB,,, | Performed by: NURSE PRACTITIONER

## 2021-01-01 PROCEDURE — 84134 ASSAY OF PREALBUMIN: CPT

## 2021-01-01 PROCEDURE — 37000008 HC ANESTHESIA 1ST 15 MINUTES: Performed by: INTERNAL MEDICINE

## 2021-01-01 PROCEDURE — D9220A PRA ANESTHESIA: Mod: ANES,,, | Performed by: ANESTHESIOLOGY

## 2021-01-01 PROCEDURE — D9220A PRA ANESTHESIA: ICD-10-PCS | Mod: ,,, | Performed by: ANESTHESIOLOGY

## 2021-01-01 PROCEDURE — 83735 ASSAY OF MAGNESIUM: CPT | Performed by: INTERNAL MEDICINE

## 2021-01-01 PROCEDURE — 93750 PR INTERROGATE VENT ASSIST DEV, IN PERSON, W PHYSICIAN ANALYSIS: ICD-10-PCS | Mod: ,,, | Performed by: THORACIC SURGERY (CARDIOTHORACIC VASCULAR SURGERY)

## 2021-01-01 PROCEDURE — 99291 PR CRITICAL CARE, E/M 30-74 MINUTES: ICD-10-PCS | Mod: ,,, | Performed by: NURSE PRACTITIONER

## 2021-01-01 PROCEDURE — 99233 SBSQ HOSP IP/OBS HIGH 50: CPT | Mod: 25,,, | Performed by: INTERNAL MEDICINE

## 2021-01-01 PROCEDURE — 87186 SC STD MICRODIL/AGAR DIL: CPT | Performed by: STUDENT IN AN ORGANIZED HEALTH CARE EDUCATION/TRAINING PROGRAM

## 2021-01-01 PROCEDURE — 80048 BASIC METABOLIC PNL TOTAL CA: CPT | Mod: 91

## 2021-01-01 PROCEDURE — 99285 EMERGENCY DEPT VISIT HI MDM: CPT

## 2021-01-01 PROCEDURE — 84295 ASSAY OF SERUM SODIUM: CPT | Mod: 91 | Performed by: PHYSICIAN ASSISTANT

## 2021-01-01 PROCEDURE — 87040 BLOOD CULTURE FOR BACTERIA: CPT | Mod: 59 | Performed by: EMERGENCY MEDICINE

## 2021-01-01 PROCEDURE — 99497 ADVNCD CARE PLAN 30 MIN: CPT | Mod: ,,, | Performed by: INTERNAL MEDICINE

## 2021-01-01 PROCEDURE — 80048 BASIC METABOLIC PNL TOTAL CA: CPT | Performed by: STUDENT IN AN ORGANIZED HEALTH CARE EDUCATION/TRAINING PROGRAM

## 2021-01-01 PROCEDURE — 83605 ASSAY OF LACTIC ACID: CPT | Performed by: STUDENT IN AN ORGANIZED HEALTH CARE EDUCATION/TRAINING PROGRAM

## 2021-01-01 PROCEDURE — 84156 ASSAY OF PROTEIN URINE: CPT | Performed by: NURSE PRACTITIONER

## 2021-01-01 PROCEDURE — 80076 HEPATIC FUNCTION PANEL: CPT | Performed by: PHYSICIAN ASSISTANT

## 2021-01-01 PROCEDURE — P9017 PLASMA 1 DONOR FRZ W/IN 8 HR: HCPCS | Performed by: HOSPITALIST

## 2021-01-01 PROCEDURE — D9220A PRA ANESTHESIA: Mod: ,,, | Performed by: ANESTHESIOLOGY

## 2021-01-01 PROCEDURE — 84550 ASSAY OF BLOOD/URIC ACID: CPT | Performed by: INTERNAL MEDICINE

## 2021-01-01 PROCEDURE — 99285 EMERGENCY DEPT VISIT HI MDM: CPT | Mod: GC,CS,, | Performed by: EMERGENCY MEDICINE

## 2021-01-01 PROCEDURE — 87040 BLOOD CULTURE FOR BACTERIA: CPT | Performed by: STUDENT IN AN ORGANIZED HEALTH CARE EDUCATION/TRAINING PROGRAM

## 2021-01-01 PROCEDURE — 99232 SBSQ HOSP IP/OBS MODERATE 35: CPT | Mod: ,,, | Performed by: PHYSICIAN ASSISTANT

## 2021-01-01 PROCEDURE — 27201037 HC PRESSURE MONITORING SET UP

## 2021-01-01 PROCEDURE — 71045 X-RAY EXAM CHEST 1 VIEW: CPT | Mod: 26,,, | Performed by: RADIOLOGY

## 2021-01-01 PROCEDURE — P9017 PLASMA 1 DONOR FRZ W/IN 8 HR: HCPCS | Performed by: PHYSICIAN ASSISTANT

## 2021-01-01 PROCEDURE — 99233 PR SUBSEQUENT HOSPITAL CARE,LEVL III: ICD-10-PCS | Mod: ,,, | Performed by: INTERNAL MEDICINE

## 2021-01-01 PROCEDURE — 89055 LEUKOCYTE ASSESSMENT FECAL: CPT | Performed by: INTERNAL MEDICINE

## 2021-01-01 PROCEDURE — 85025 COMPLETE CBC W/AUTO DIFF WBC: CPT | Performed by: STUDENT IN AN ORGANIZED HEALTH CARE EDUCATION/TRAINING PROGRAM

## 2021-01-01 PROCEDURE — 99223 1ST HOSP IP/OBS HIGH 75: CPT | Mod: GC,,, | Performed by: INTERNAL MEDICINE

## 2021-01-01 PROCEDURE — 99233 PR SUBSEQUENT HOSPITAL CARE,LEVL III: ICD-10-PCS | Mod: GC,,, | Performed by: INTERNAL MEDICINE

## 2021-01-01 PROCEDURE — 33241 REMOVE PULSE GENERATOR: CPT | Mod: 51,,, | Performed by: INTERNAL MEDICINE

## 2021-01-01 PROCEDURE — 85610 PROTHROMBIN TIME: CPT | Performed by: STUDENT IN AN ORGANIZED HEALTH CARE EDUCATION/TRAINING PROGRAM

## 2021-01-01 PROCEDURE — 1160F RVW MEDS BY RX/DR IN RCRD: CPT | Mod: CPTII,95,, | Performed by: STUDENT IN AN ORGANIZED HEALTH CARE EDUCATION/TRAINING PROGRAM

## 2021-01-01 PROCEDURE — 86140 C-REACTIVE PROTEIN: CPT | Performed by: STUDENT IN AN ORGANIZED HEALTH CARE EDUCATION/TRAINING PROGRAM

## 2021-01-01 PROCEDURE — 99999 PR PBB SHADOW E&M-EST. PATIENT-LVL II: ICD-10-PCS | Mod: PBBFAC,,, | Performed by: INTERNAL MEDICINE

## 2021-01-01 PROCEDURE — U0002 COVID-19 LAB TEST NON-CDC: HCPCS | Performed by: STUDENT IN AN ORGANIZED HEALTH CARE EDUCATION/TRAINING PROGRAM

## 2021-01-01 PROCEDURE — 76705 ECHO EXAM OF ABDOMEN: CPT | Mod: 26,,, | Performed by: RADIOLOGY

## 2021-01-01 PROCEDURE — 99215 OFFICE O/P EST HI 40 MIN: CPT | Mod: S$GLB,,, | Performed by: INTERNAL MEDICINE

## 2021-01-01 PROCEDURE — 83880 ASSAY OF NATRIURETIC PEPTIDE: CPT | Performed by: INTERNAL MEDICINE

## 2021-01-01 PROCEDURE — 93750 INTERROGATION VAD IN PERSON: CPT | Mod: S$GLB,,, | Performed by: INTERNAL MEDICINE

## 2021-01-01 PROCEDURE — 99223 PR INITIAL HOSPITAL CARE,LEVL III: ICD-10-PCS | Mod: AI,,, | Performed by: INTERNAL MEDICINE

## 2021-01-01 PROCEDURE — 80202 ASSAY OF VANCOMYCIN: CPT

## 2021-01-01 PROCEDURE — 83615 LACTATE (LD) (LDH) ENZYME: CPT | Performed by: STUDENT IN AN ORGANIZED HEALTH CARE EDUCATION/TRAINING PROGRAM

## 2021-01-01 PROCEDURE — 93750 INTERROGATION VAD IN PERSON: CPT | Mod: ,,, | Performed by: THORACIC SURGERY (CARDIOTHORACIC VASCULAR SURGERY)

## 2021-01-01 PROCEDURE — 97161 PT EVAL LOW COMPLEX 20 MIN: CPT

## 2021-01-01 PROCEDURE — 36415 COLL VENOUS BLD VENIPUNCTURE: CPT | Mod: PO | Performed by: INTERNAL MEDICINE

## 2021-01-01 PROCEDURE — 63600175 PHARM REV CODE 636 W HCPCS: Performed by: NURSE PRACTITIONER

## 2021-01-01 PROCEDURE — 1111F PR DISCHARGE MEDS RECONCILED W/ CURRENT OUTPATIENT MED LIST: ICD-10-PCS | Mod: CPTII,95,, | Performed by: STUDENT IN AN ORGANIZED HEALTH CARE EDUCATION/TRAINING PROGRAM

## 2021-01-01 PROCEDURE — 83735 ASSAY OF MAGNESIUM: CPT | Mod: 91 | Performed by: INTERNAL MEDICINE

## 2021-01-01 PROCEDURE — 85025 COMPLETE CBC W/AUTO DIFF WBC: CPT | Performed by: EMERGENCY MEDICINE

## 2021-01-01 PROCEDURE — 25500020 PHARM REV CODE 255: Performed by: INTERNAL MEDICINE

## 2021-01-01 PROCEDURE — 99233 PR SUBSEQUENT HOSPITAL CARE,LEVL III: ICD-10-PCS | Mod: 95,,, | Performed by: PHYSICIAN ASSISTANT

## 2021-01-01 PROCEDURE — 99232 SBSQ HOSP IP/OBS MODERATE 35: CPT | Mod: ,,, | Performed by: INTERNAL MEDICINE

## 2021-01-01 PROCEDURE — 99292 CRITICAL CARE ADDL 30 MIN: CPT | Mod: ,,, | Performed by: PHYSICIAN ASSISTANT

## 2021-01-01 PROCEDURE — 99214 OFFICE O/P EST MOD 30 MIN: CPT | Mod: S$GLB,,, | Performed by: PHYSICIAN ASSISTANT

## 2021-01-01 PROCEDURE — 83735 ASSAY OF MAGNESIUM: CPT | Performed by: NURSE PRACTITIONER

## 2021-01-01 PROCEDURE — 99999 PR PBB SHADOW E&M-EST. PATIENT-LVL III: ICD-10-PCS | Mod: PBBFAC,,, | Performed by: INTERNAL MEDICINE

## 2021-01-01 PROCEDURE — 87070 CULTURE OTHR SPECIMN AEROBIC: CPT | Performed by: PHYSICIAN ASSISTANT

## 2021-01-01 PROCEDURE — 99231 PR SUBSEQUENT HOSPITAL CARE,LEVL I: ICD-10-PCS | Mod: ,,, | Performed by: INTERNAL MEDICINE

## 2021-01-01 PROCEDURE — 25000003 PHARM REV CODE 250: Performed by: PSYCHIATRY & NEUROLOGY

## 2021-01-01 PROCEDURE — 99233 SBSQ HOSP IP/OBS HIGH 50: CPT | Mod: 24,25,, | Performed by: THORACIC SURGERY (CARDIOTHORACIC VASCULAR SURGERY)

## 2021-01-01 PROCEDURE — 1126F PR PAIN SEVERITY QUANTIFIED, NO PAIN PRESENT: ICD-10-PCS | Mod: S$GLB,,, | Performed by: INTERNAL MEDICINE

## 2021-01-01 PROCEDURE — 25000242 PHARM REV CODE 250 ALT 637 W/ HCPCS: Performed by: STUDENT IN AN ORGANIZED HEALTH CARE EDUCATION/TRAINING PROGRAM

## 2021-01-01 PROCEDURE — D9220A PRA ANESTHESIA: ICD-10-PCS | Mod: CRNA,,, | Performed by: NURSE ANESTHETIST, CERTIFIED REGISTERED

## 2021-01-01 PROCEDURE — 85730 THROMBOPLASTIN TIME PARTIAL: CPT

## 2021-01-01 PROCEDURE — 99223 PR INITIAL HOSPITAL CARE,LEVL III: ICD-10-PCS | Mod: ,,, | Performed by: NURSE PRACTITIONER

## 2021-01-01 PROCEDURE — 99999 PR PBB SHADOW E&M-EST. PATIENT-LVL III: CPT | Mod: PBBFAC,,, | Performed by: INTERNAL MEDICINE

## 2021-01-01 PROCEDURE — 99291 PR CRITICAL CARE, E/M 30-74 MINUTES: ICD-10-PCS | Mod: ,,, | Performed by: PHYSICIAN ASSISTANT

## 2021-01-01 PROCEDURE — 97162 PT EVAL MOD COMPLEX 30 MIN: CPT

## 2021-01-01 PROCEDURE — 85652 RBC SED RATE AUTOMATED: CPT | Performed by: INTERNAL MEDICINE

## 2021-01-01 PROCEDURE — 99291 CRITICAL CARE FIRST HOUR: CPT | Mod: GC,,, | Performed by: PHYSICIAN ASSISTANT

## 2021-01-01 PROCEDURE — 97165 OT EVAL LOW COMPLEX 30 MIN: CPT

## 2021-01-01 PROCEDURE — 85610 PROTHROMBIN TIME: CPT

## 2021-01-01 PROCEDURE — 99283 EMERGENCY DEPT VISIT LOW MDM: CPT | Mod: 25

## 2021-01-01 PROCEDURE — 84295 ASSAY OF SERUM SODIUM: CPT | Performed by: NURSE PRACTITIONER

## 2021-01-01 PROCEDURE — 85025 COMPLETE CBC W/AUTO DIFF WBC: CPT | Performed by: INTERNAL MEDICINE

## 2021-01-01 PROCEDURE — 99999 PR PBB SHADOW E&M-EST. PATIENT-LVL III: CPT | Mod: PBBFAC,,, | Performed by: NURSE PRACTITIONER

## 2021-01-01 PROCEDURE — 84295 ASSAY OF SERUM SODIUM: CPT | Mod: 91 | Performed by: INTERNAL MEDICINE

## 2021-01-01 PROCEDURE — G0179 MD RECERTIFICATION HHA PT: HCPCS | Mod: ,,, | Performed by: INTERNAL MEDICINE

## 2021-01-01 PROCEDURE — 85730 THROMBOPLASTIN TIME PARTIAL: CPT | Mod: 91 | Performed by: PHYSICIAN ASSISTANT

## 2021-01-01 PROCEDURE — 80076 HEPATIC FUNCTION PANEL: CPT

## 2021-01-01 PROCEDURE — 37000008 HC ANESTHESIA 1ST 15 MINUTES

## 2021-01-01 PROCEDURE — C1751 CATH, INF, PER/CENT/MIDLINE: HCPCS

## 2021-01-01 PROCEDURE — 82550 ASSAY OF CK (CPK): CPT | Performed by: EMERGENCY MEDICINE

## 2021-01-01 PROCEDURE — 84100 ASSAY OF PHOSPHORUS: CPT | Performed by: STUDENT IN AN ORGANIZED HEALTH CARE EDUCATION/TRAINING PROGRAM

## 2021-01-01 PROCEDURE — 97116 GAIT TRAINING THERAPY: CPT | Mod: CQ

## 2021-01-01 PROCEDURE — 99233 SBSQ HOSP IP/OBS HIGH 50: CPT | Mod: ,,, | Performed by: PSYCHIATRY & NEUROLOGY

## 2021-01-01 PROCEDURE — 99223 PR INITIAL HOSPITAL CARE,LEVL III: ICD-10-PCS | Mod: ,,, | Performed by: PSYCHIATRY & NEUROLOGY

## 2021-01-01 PROCEDURE — 99233 SBSQ HOSP IP/OBS HIGH 50: CPT | Mod: ,,, | Performed by: CLINICAL NURSE SPECIALIST

## 2021-01-01 PROCEDURE — 84134 ASSAY OF PREALBUMIN: CPT | Performed by: STUDENT IN AN ORGANIZED HEALTH CARE EDUCATION/TRAINING PROGRAM

## 2021-01-01 PROCEDURE — 36415 COLL VENOUS BLD VENIPUNCTURE: CPT | Performed by: NURSE PRACTITIONER

## 2021-01-01 PROCEDURE — 82550 ASSAY OF CK (CPK): CPT | Performed by: NURSE PRACTITIONER

## 2021-01-01 PROCEDURE — 76937 US GUIDE VASCULAR ACCESS: CPT

## 2021-01-01 PROCEDURE — 83036 HEMOGLOBIN GLYCOSYLATED A1C: CPT | Performed by: INTERNAL MEDICINE

## 2021-01-01 PROCEDURE — 4010F PR ACE/ARB THEARPY RXD/TAKEN: ICD-10-PCS | Mod: CPTII,95,, | Performed by: STUDENT IN AN ORGANIZED HEALTH CARE EDUCATION/TRAINING PROGRAM

## 2021-01-01 PROCEDURE — 99214 PR OFFICE/OUTPT VISIT, EST, LEVL IV, 30-39 MIN: ICD-10-PCS | Mod: S$GLB,,, | Performed by: PHYSICIAN ASSISTANT

## 2021-01-01 PROCEDURE — 87040 BLOOD CULTURE FOR BACTERIA: CPT | Performed by: INTERNAL MEDICINE

## 2021-01-01 PROCEDURE — 3079F PR MOST RECENT DIASTOLIC BLOOD PRESSURE 80-89 MM HG: ICD-10-PCS | Mod: CPTII,S$GLB,, | Performed by: STUDENT IN AN ORGANIZED HEALTH CARE EDUCATION/TRAINING PROGRAM

## 2021-01-01 PROCEDURE — 96365 THER/PROPH/DIAG IV INF INIT: CPT | Mod: 59

## 2021-01-01 PROCEDURE — 99999 PR PBB SHADOW E&M-EST. PATIENT-LVL III: ICD-10-PCS | Mod: PBBFAC,,, | Performed by: STUDENT IN AN ORGANIZED HEALTH CARE EDUCATION/TRAINING PROGRAM

## 2021-01-01 PROCEDURE — 3079F DIAST BP 80-89 MM HG: CPT | Mod: CPTII,S$GLB,, | Performed by: STUDENT IN AN ORGANIZED HEALTH CARE EDUCATION/TRAINING PROGRAM

## 2021-01-01 PROCEDURE — 84132 ASSAY OF SERUM POTASSIUM: CPT | Performed by: INTERNAL MEDICINE

## 2021-01-01 PROCEDURE — 99233 SBSQ HOSP IP/OBS HIGH 50: CPT | Mod: 95,,, | Performed by: NURSE PRACTITIONER

## 2021-01-01 PROCEDURE — 99232 PR SUBSEQUENT HOSPITAL CARE,LEVL II: ICD-10-PCS | Mod: GC,,, | Performed by: INTERNAL MEDICINE

## 2021-01-01 PROCEDURE — 87040 BLOOD CULTURE FOR BACTERIA: CPT | Mod: 59

## 2021-01-01 PROCEDURE — 99233 SBSQ HOSP IP/OBS HIGH 50: CPT | Mod: GC,,, | Performed by: STUDENT IN AN ORGANIZED HEALTH CARE EDUCATION/TRAINING PROGRAM

## 2021-01-01 PROCEDURE — 83935 ASSAY OF URINE OSMOLALITY: CPT | Performed by: NURSE PRACTITIONER

## 2021-01-01 PROCEDURE — 25000003 PHARM REV CODE 250: Performed by: HOSPITALIST

## 2021-01-01 PROCEDURE — 87040 BLOOD CULTURE FOR BACTERIA: CPT | Mod: 59 | Performed by: STUDENT IN AN ORGANIZED HEALTH CARE EDUCATION/TRAINING PROGRAM

## 2021-01-01 PROCEDURE — 80053 COMPREHEN METABOLIC PANEL: CPT | Performed by: NURSE PRACTITIONER

## 2021-01-01 PROCEDURE — 3008F BODY MASS INDEX DOCD: CPT | Mod: CPTII,S$GLB,, | Performed by: INTERNAL MEDICINE

## 2021-01-01 PROCEDURE — 99291 PR CRITICAL CARE, E/M 30-74 MINUTES: ICD-10-PCS | Mod: GC,,, | Performed by: PHYSICIAN ASSISTANT

## 2021-01-01 PROCEDURE — 99497 PR ADVNCD CARE PLAN 30 MIN: ICD-10-PCS | Mod: 25,,, | Performed by: INTERNAL MEDICINE

## 2021-01-01 PROCEDURE — 83605 ASSAY OF LACTIC ACID: CPT | Mod: 91 | Performed by: INTERNAL MEDICINE

## 2021-01-01 PROCEDURE — 87077 CULTURE AEROBIC IDENTIFY: CPT | Mod: 59 | Performed by: STUDENT IN AN ORGANIZED HEALTH CARE EDUCATION/TRAINING PROGRAM

## 2021-01-01 PROCEDURE — 3075F SYST BP GE 130 - 139MM HG: CPT | Mod: CPTII,S$GLB,, | Performed by: STUDENT IN AN ORGANIZED HEALTH CARE EDUCATION/TRAINING PROGRAM

## 2021-01-01 PROCEDURE — 87077 CULTURE AEROBIC IDENTIFY: CPT | Performed by: INTERNAL MEDICINE

## 2021-01-01 PROCEDURE — 27201423 OPTIME MED/SURG SUP & DEVICES STERILE SUPPLY: Performed by: INTERNAL MEDICINE

## 2021-01-01 PROCEDURE — 99223 1ST HOSP IP/OBS HIGH 75: CPT | Mod: ,,, | Performed by: INTERNAL MEDICINE

## 2021-01-01 PROCEDURE — 85014 HEMATOCRIT: CPT

## 2021-01-01 PROCEDURE — 3044F PR MOST RECENT HEMOGLOBIN A1C LEVEL <7.0%: ICD-10-PCS | Mod: CPTII,S$GLB,, | Performed by: STUDENT IN AN ORGANIZED HEALTH CARE EDUCATION/TRAINING PROGRAM

## 2021-01-01 PROCEDURE — 33241 REMOVE PULSE GENERATOR: CPT | Performed by: INTERNAL MEDICINE

## 2021-01-01 PROCEDURE — 85730 THROMBOPLASTIN TIME PARTIAL: CPT | Performed by: NURSE PRACTITIONER

## 2021-01-01 PROCEDURE — 33244 REMOVE ELCTRD TRANSVENOUSLY: CPT | Performed by: INTERNAL MEDICINE

## 2021-01-01 PROCEDURE — 99232 SBSQ HOSP IP/OBS MODERATE 35: CPT | Mod: GC,,, | Performed by: INTERNAL MEDICINE

## 2021-01-01 PROCEDURE — U0002 COVID-19 LAB TEST NON-CDC: HCPCS

## 2021-01-01 PROCEDURE — 83735 ASSAY OF MAGNESIUM: CPT | Mod: 91 | Performed by: NURSE PRACTITIONER

## 2021-01-01 PROCEDURE — 80053 COMPREHEN METABOLIC PANEL: CPT | Performed by: STUDENT IN AN ORGANIZED HEALTH CARE EDUCATION/TRAINING PROGRAM

## 2021-01-01 PROCEDURE — 84132 ASSAY OF SERUM POTASSIUM: CPT | Performed by: NURSE PRACTITIONER

## 2021-01-01 PROCEDURE — 94618 PULMONARY STRESS TESTING: CPT | Mod: S$GLB,,, | Performed by: INTERNAL MEDICINE

## 2021-01-01 PROCEDURE — G0180 MD CERTIFICATION HHA PATIENT: HCPCS | Mod: ,,, | Performed by: INTERNAL MEDICINE

## 2021-01-01 PROCEDURE — 4010F PR ACE/ARB THEARPY RXD/TAKEN: ICD-10-PCS | Mod: CPTII,S$GLB,, | Performed by: INTERNAL MEDICINE

## 2021-01-01 PROCEDURE — 87186 SC STD MICRODIL/AGAR DIL: CPT | Mod: 59 | Performed by: INTERNAL MEDICINE

## 2021-01-01 PROCEDURE — U0002 COVID-19 LAB TEST NON-CDC: HCPCS | Performed by: INTERNAL MEDICINE

## 2021-01-01 PROCEDURE — 82330 ASSAY OF CALCIUM: CPT

## 2021-01-01 PROCEDURE — 99284 EMERGENCY DEPT VISIT MOD MDM: CPT | Mod: CS,,, | Performed by: EMERGENCY MEDICINE

## 2021-01-01 PROCEDURE — 84484 ASSAY OF TROPONIN QUANT: CPT | Performed by: STUDENT IN AN ORGANIZED HEALTH CARE EDUCATION/TRAINING PROGRAM

## 2021-01-01 PROCEDURE — 93010 ELECTROCARDIOGRAM REPORT: CPT | Mod: ,,, | Performed by: INTERNAL MEDICINE

## 2021-01-01 PROCEDURE — 3008F BODY MASS INDEX DOCD: CPT | Mod: CPTII,S$GLB,, | Performed by: PHYSICIAN ASSISTANT

## 2021-01-01 PROCEDURE — 80053 COMPREHEN METABOLIC PANEL: CPT

## 2021-01-01 PROCEDURE — 1160F PR REVIEW ALL MEDS BY PRESCRIBER/CLIN PHARMACIST DOCUMENTED: ICD-10-PCS | Mod: CPTII,95,, | Performed by: STUDENT IN AN ORGANIZED HEALTH CARE EDUCATION/TRAINING PROGRAM

## 2021-01-01 PROCEDURE — 3008F PR BODY MASS INDEX (BMI) DOCUMENTED: ICD-10-PCS | Mod: CPTII,S$GLB,, | Performed by: PHYSICIAN ASSISTANT

## 2021-01-01 PROCEDURE — 85730 THROMBOPLASTIN TIME PARTIAL: CPT | Mod: 91 | Performed by: NURSE PRACTITIONER

## 2021-01-01 PROCEDURE — 85730 THROMBOPLASTIN TIME PARTIAL: CPT | Performed by: EMERGENCY MEDICINE

## 2021-01-01 PROCEDURE — 82550 ASSAY OF CK (CPK): CPT | Performed by: STUDENT IN AN ORGANIZED HEALTH CARE EDUCATION/TRAINING PROGRAM

## 2021-01-01 PROCEDURE — 84295 ASSAY OF SERUM SODIUM: CPT | Performed by: INTERNAL MEDICINE

## 2021-01-01 PROCEDURE — 99284 EMERGENCY DEPT VISIT MOD MDM: CPT | Mod: 25

## 2021-01-01 PROCEDURE — 97535 SELF CARE MNGMENT TRAINING: CPT

## 2021-01-01 PROCEDURE — 76705 ECHO EXAM OF ABDOMEN: CPT | Mod: TC

## 2021-01-01 PROCEDURE — U0002 COVID-19 LAB TEST NON-CDC: HCPCS | Performed by: EMERGENCY MEDICINE

## 2021-01-01 PROCEDURE — 82248 BILIRUBIN DIRECT: CPT | Performed by: INTERNAL MEDICINE

## 2021-01-01 PROCEDURE — 36415 COLL VENOUS BLD VENIPUNCTURE: CPT

## 2021-01-01 PROCEDURE — 99232 SBSQ HOSP IP/OBS MODERATE 35: CPT | Mod: GC,,, | Performed by: NEUROLOGICAL SURGERY

## 2021-01-01 PROCEDURE — 4010F ACE/ARB THERAPY RXD/TAKEN: CPT | Mod: CPTII,S$GLB,, | Performed by: STUDENT IN AN ORGANIZED HEALTH CARE EDUCATION/TRAINING PROGRAM

## 2021-01-01 PROCEDURE — 99223 PR INITIAL HOSPITAL CARE,LEVL III: ICD-10-PCS | Mod: ,,, | Performed by: NEUROLOGICAL SURGERY

## 2021-01-01 PROCEDURE — 99233 PR SUBSEQUENT HOSPITAL CARE,LEVL III: ICD-10-PCS | Mod: GC,,, | Performed by: PSYCHIATRY & NEUROLOGY

## 2021-01-01 PROCEDURE — 99223 1ST HOSP IP/OBS HIGH 75: CPT | Mod: AI,,, | Performed by: INTERNAL MEDICINE

## 2021-01-01 PROCEDURE — 99233 PR SUBSEQUENT HOSPITAL CARE,LEVL III: ICD-10-PCS | Mod: ,,, | Performed by: PSYCHIATRY & NEUROLOGY

## 2021-01-01 PROCEDURE — 99284 PR EMERGENCY DEPT VISIT,LEVEL IV: ICD-10-PCS | Mod: ,,, | Performed by: EMERGENCY MEDICINE

## 2021-01-01 PROCEDURE — 99999 PR PBB SHADOW E&M-EST. PATIENT-LVL II: CPT | Mod: PBBFAC,,, | Performed by: INTERNAL MEDICINE

## 2021-01-01 PROCEDURE — 99999 PR PBB SHADOW E&M-EST. PATIENT-LVL I: CPT | Mod: PBBFAC,,,

## 2021-01-01 PROCEDURE — 82728 ASSAY OF FERRITIN: CPT

## 2021-01-01 PROCEDURE — 99214 PR OFFICE/OUTPT VISIT, EST, LEVL IV, 30-39 MIN: ICD-10-PCS | Mod: S$GLB,,, | Performed by: INTERNAL MEDICINE

## 2021-01-01 PROCEDURE — 36620 PR INSERT CATH,ART,PERCUT,SHORTTERM: ICD-10-PCS | Mod: 59,,, | Performed by: ANESTHESIOLOGY

## 2021-01-01 PROCEDURE — 87077 CULTURE AEROBIC IDENTIFY: CPT | Mod: 59 | Performed by: INTERNAL MEDICINE

## 2021-01-01 PROCEDURE — 99231 SBSQ HOSP IP/OBS SF/LOW 25: CPT | Mod: ,,, | Performed by: INTERNAL MEDICINE

## 2021-01-01 PROCEDURE — 99497 ADVNCD CARE PLAN 30 MIN: CPT | Mod: S$GLB,,, | Performed by: STUDENT IN AN ORGANIZED HEALTH CARE EDUCATION/TRAINING PROGRAM

## 2021-01-01 PROCEDURE — 1126F PR PAIN SEVERITY QUANTIFIED, NO PAIN PRESENT: ICD-10-PCS | Mod: S$GLB,,, | Performed by: PHYSICIAN ASSISTANT

## 2021-01-01 PROCEDURE — 87040 BLOOD CULTURE FOR BACTERIA: CPT | Performed by: PHYSICIAN ASSISTANT

## 2021-01-01 PROCEDURE — 99222 PR INITIAL HOSPITAL CARE,LEVL II: ICD-10-PCS | Mod: ,,, | Performed by: INTERNAL MEDICINE

## 2021-01-01 PROCEDURE — 99233 PR SUBSEQUENT HOSPITAL CARE,LEVL III: ICD-10-PCS | Mod: GC,,, | Performed by: NEUROLOGICAL SURGERY

## 2021-01-01 PROCEDURE — U0002 COVID-19 LAB TEST NON-CDC: HCPCS | Performed by: PHYSICIAN ASSISTANT

## 2021-01-01 PROCEDURE — 85610 PROTHROMBIN TIME: CPT | Mod: 91 | Performed by: STUDENT IN AN ORGANIZED HEALTH CARE EDUCATION/TRAINING PROGRAM

## 2021-01-01 PROCEDURE — 85025 COMPLETE CBC W/AUTO DIFF WBC: CPT | Mod: PO | Performed by: INTERNAL MEDICINE

## 2021-01-01 PROCEDURE — 71045 XR CHEST 1 VIEW S/P PICC LINE BY NURSING: ICD-10-PCS | Mod: 26,,, | Performed by: RADIOLOGY

## 2021-01-01 PROCEDURE — 37000009 HC ANESTHESIA EA ADD 15 MINS: Performed by: INTERNAL MEDICINE

## 2021-01-01 PROCEDURE — 87205 SMEAR GRAM STAIN: CPT | Performed by: INTERNAL MEDICINE

## 2021-01-01 PROCEDURE — 3044F HG A1C LEVEL LT 7.0%: CPT | Mod: CPTII,S$GLB,, | Performed by: STUDENT IN AN ORGANIZED HEALTH CARE EDUCATION/TRAINING PROGRAM

## 2021-01-01 PROCEDURE — 87077 CULTURE AEROBIC IDENTIFY: CPT | Performed by: STUDENT IN AN ORGANIZED HEALTH CARE EDUCATION/TRAINING PROGRAM

## 2021-01-01 PROCEDURE — 80076 HEPATIC FUNCTION PANEL: CPT | Performed by: STUDENT IN AN ORGANIZED HEALTH CARE EDUCATION/TRAINING PROGRAM

## 2021-01-01 PROCEDURE — 99214 OFFICE O/P EST MOD 30 MIN: CPT | Mod: S$GLB,,, | Performed by: NURSE PRACTITIONER

## 2021-01-01 PROCEDURE — 99497 ADVNCD CARE PLAN 30 MIN: CPT | Mod: 25,,, | Performed by: CLINICAL NURSE SPECIALIST

## 2021-01-01 PROCEDURE — 87324 CLOSTRIDIUM AG IA: CPT | Performed by: INTERNAL MEDICINE

## 2021-01-01 PROCEDURE — 99292 PR CRITICAL CARE, ADDL 30 MIN: ICD-10-PCS | Mod: ,,, | Performed by: PHYSICIAN ASSISTANT

## 2021-01-01 PROCEDURE — 99223 PR INITIAL HOSPITAL CARE,LEVL III: ICD-10-PCS | Mod: GC,,, | Performed by: STUDENT IN AN ORGANIZED HEALTH CARE EDUCATION/TRAINING PROGRAM

## 2021-01-01 PROCEDURE — 99999 PR PBB SHADOW E&M-EST. PATIENT-LVL III: CPT | Mod: PBBFAC,,, | Performed by: STUDENT IN AN ORGANIZED HEALTH CARE EDUCATION/TRAINING PROGRAM

## 2021-01-01 PROCEDURE — 85025 COMPLETE CBC W/AUTO DIFF WBC: CPT | Mod: 91

## 2021-01-01 PROCEDURE — 87077 CULTURE AEROBIC IDENTIFY: CPT

## 2021-01-01 PROCEDURE — 85025 COMPLETE CBC W/AUTO DIFF WBC: CPT | Mod: 91 | Performed by: STUDENT IN AN ORGANIZED HEALTH CARE EDUCATION/TRAINING PROGRAM

## 2021-01-01 PROCEDURE — 1126F AMNT PAIN NOTED NONE PRSNT: CPT | Mod: S$GLB,,, | Performed by: INTERNAL MEDICINE

## 2021-01-01 PROCEDURE — 87075 CULTR BACTERIA EXCEPT BLOOD: CPT | Performed by: INTERNAL MEDICINE

## 2021-01-01 PROCEDURE — 99233 SBSQ HOSP IP/OBS HIGH 50: CPT | Mod: 95,,, | Performed by: PHYSICIAN ASSISTANT

## 2021-01-01 PROCEDURE — 82550 ASSAY OF CK (CPK): CPT | Performed by: INTERNAL MEDICINE

## 2021-01-01 PROCEDURE — 36410 VNPNXR 3YR/> PHY/QHP DX/THER: CPT

## 2021-01-01 PROCEDURE — 33241 PR RMV PULSE GENERATOR,SNGL/DUAL: ICD-10-PCS | Mod: 51,,, | Performed by: INTERNAL MEDICINE

## 2021-01-01 PROCEDURE — 36573 INSJ PICC RS&I 5 YR+: CPT

## 2021-01-01 PROCEDURE — 99222 1ST HOSP IP/OBS MODERATE 55: CPT | Mod: ,,, | Performed by: INTERNAL MEDICINE

## 2021-01-01 PROCEDURE — 99232 SBSQ HOSP IP/OBS MODERATE 35: CPT | Mod: GC,,, | Performed by: PSYCHIATRY & NEUROLOGY

## 2021-01-01 PROCEDURE — 87186 SC STD MICRODIL/AGAR DIL: CPT | Performed by: INTERNAL MEDICINE

## 2021-01-01 PROCEDURE — 87449 NOS EACH ORGANISM AG IA: CPT | Performed by: INTERNAL MEDICINE

## 2021-01-01 PROCEDURE — 99205 OFFICE O/P NEW HI 60 MIN: CPT | Mod: S$GLB,,, | Performed by: STUDENT IN AN ORGANIZED HEALTH CARE EDUCATION/TRAINING PROGRAM

## 2021-01-01 PROCEDURE — 1159F MED LIST DOCD IN RCRD: CPT | Mod: CPTII,95,, | Performed by: STUDENT IN AN ORGANIZED HEALTH CARE EDUCATION/TRAINING PROGRAM

## 2021-01-01 PROCEDURE — 80048 BASIC METABOLIC PNL TOTAL CA: CPT | Mod: 91 | Performed by: INTERNAL MEDICINE

## 2021-01-01 PROCEDURE — 3044F PR MOST RECENT HEMOGLOBIN A1C LEVEL <7.0%: ICD-10-PCS | Mod: CPTII,95,, | Performed by: STUDENT IN AN ORGANIZED HEALTH CARE EDUCATION/TRAINING PROGRAM

## 2021-01-01 PROCEDURE — 71045 XR CHEST AP PORTABLE: ICD-10-PCS | Mod: 26,,, | Performed by: RADIOLOGY

## 2021-01-01 PROCEDURE — 99291 PR CRITICAL CARE, E/M 30-74 MINUTES: ICD-10-PCS | Mod: ICN,,, | Performed by: PHYSICIAN ASSISTANT

## 2021-01-01 PROCEDURE — G0180 PR HOME HEALTH MD CERTIFICATION: ICD-10-PCS | Mod: ,,, | Performed by: INTERNAL MEDICINE

## 2021-01-01 PROCEDURE — 3008F PR BODY MASS INDEX (BMI) DOCUMENTED: ICD-10-PCS | Mod: CPTII,S$GLB,, | Performed by: INTERNAL MEDICINE

## 2021-01-01 PROCEDURE — 99223 1ST HOSP IP/OBS HIGH 75: CPT | Mod: ,,, | Performed by: NEUROLOGICAL SURGERY

## 2021-01-01 PROCEDURE — 99214 OFFICE O/P EST MOD 30 MIN: CPT | Mod: 95,,, | Performed by: STUDENT IN AN ORGANIZED HEALTH CARE EDUCATION/TRAINING PROGRAM

## 2021-01-01 PROCEDURE — 71045 X-RAY EXAM CHEST 1 VIEW: CPT | Mod: TC,FY

## 2021-01-01 PROCEDURE — 84443 ASSAY THYROID STIM HORMONE: CPT | Performed by: STUDENT IN AN ORGANIZED HEALTH CARE EDUCATION/TRAINING PROGRAM

## 2021-01-01 PROCEDURE — 84300 ASSAY OF URINE SODIUM: CPT | Performed by: NURSE PRACTITIONER

## 2021-01-01 PROCEDURE — 99284 PR EMERGENCY DEPT VISIT,LEVEL IV: ICD-10-PCS | Mod: ,,, | Performed by: PHYSICIAN ASSISTANT

## 2021-01-01 PROCEDURE — 80053 COMPREHEN METABOLIC PANEL: CPT | Mod: 91 | Performed by: INTERNAL MEDICINE

## 2021-01-01 PROCEDURE — 80053 COMPREHEN METABOLIC PANEL: CPT | Mod: 91 | Performed by: STUDENT IN AN ORGANIZED HEALTH CARE EDUCATION/TRAINING PROGRAM

## 2021-01-01 PROCEDURE — 3075F PR MOST RECENT SYSTOLIC BLOOD PRESS GE 130-139MM HG: ICD-10-PCS | Mod: CPTII,S$GLB,, | Performed by: STUDENT IN AN ORGANIZED HEALTH CARE EDUCATION/TRAINING PROGRAM

## 2021-01-01 PROCEDURE — 99239 HOSP IP/OBS DSCHRG MGMT >30: CPT | Mod: ,,, | Performed by: INTERNAL MEDICINE

## 2021-01-01 PROCEDURE — 84295 ASSAY OF SERUM SODIUM: CPT

## 2021-01-01 PROCEDURE — 82533 TOTAL CORTISOL: CPT | Performed by: STUDENT IN AN ORGANIZED HEALTH CARE EDUCATION/TRAINING PROGRAM

## 2021-01-01 PROCEDURE — 80202 ASSAY OF VANCOMYCIN: CPT | Mod: 91

## 2021-01-01 PROCEDURE — 33244 REMOVE ELCTRD TRANSVENOUSLY: CPT | Mod: ,,, | Performed by: INTERNAL MEDICINE

## 2021-01-01 PROCEDURE — 1126F AMNT PAIN NOTED NONE PRSNT: CPT | Mod: S$GLB,,, | Performed by: PHYSICIAN ASSISTANT

## 2021-01-01 PROCEDURE — 80053 COMPREHEN METABOLIC PANEL: CPT | Performed by: EMERGENCY MEDICINE

## 2021-01-01 PROCEDURE — 36620 INSERTION CATHETER ARTERY: CPT | Mod: 59,,, | Performed by: ANESTHESIOLOGY

## 2021-01-01 PROCEDURE — 99999 PR PBB SHADOW E&M-EST. PATIENT-LVL III: CPT | Mod: PBBFAC,,, | Performed by: PHYSICIAN ASSISTANT

## 2021-01-01 PROCEDURE — 80048 BASIC METABOLIC PNL TOTAL CA: CPT | Performed by: PHYSICIAN ASSISTANT

## 2021-01-01 PROCEDURE — 71250 CT THORAX DX C-: CPT | Mod: TC

## 2021-01-01 PROCEDURE — 99999 PR PBB SHADOW E&M-EST. PATIENT-LVL III: ICD-10-PCS | Mod: PBBFAC,,, | Performed by: NURSE PRACTITIONER

## 2021-01-01 PROCEDURE — 86703 HIV-1/HIV-2 1 RESULT ANTBDY: CPT | Performed by: EMERGENCY MEDICINE

## 2021-01-01 PROCEDURE — 83036 HEMOGLOBIN GLYCOSYLATED A1C: CPT | Performed by: STUDENT IN AN ORGANIZED HEALTH CARE EDUCATION/TRAINING PROGRAM

## 2021-01-01 PROCEDURE — 99223 PR INITIAL HOSPITAL CARE,LEVL III: ICD-10-PCS | Mod: ,,, | Performed by: INTERNAL MEDICINE

## 2021-01-01 PROCEDURE — 87075 CULTR BACTERIA EXCEPT BLOOD: CPT | Mod: 59 | Performed by: INTERNAL MEDICINE

## 2021-01-01 PROCEDURE — C1894 INTRO/SHEATH, NON-LASER: HCPCS | Performed by: INTERNAL MEDICINE

## 2021-01-01 PROCEDURE — 86900 BLOOD TYPING SEROLOGIC ABO: CPT | Performed by: PHYSICIAN ASSISTANT

## 2021-01-01 PROCEDURE — 85652 RBC SED RATE AUTOMATED: CPT | Performed by: STUDENT IN AN ORGANIZED HEALTH CARE EDUCATION/TRAINING PROGRAM

## 2021-01-01 PROCEDURE — 3044F HG A1C LEVEL LT 7.0%: CPT | Mod: CPTII,95,, | Performed by: STUDENT IN AN ORGANIZED HEALTH CARE EDUCATION/TRAINING PROGRAM

## 2021-01-01 PROCEDURE — 36430 TRANSFUSION BLD/BLD COMPNT: CPT

## 2021-01-01 PROCEDURE — 99499 NO LOS: ICD-10-PCS | Mod: ,,, | Performed by: PHYSICIAN ASSISTANT

## 2021-01-01 PROCEDURE — 86803 HEPATITIS C AB TEST: CPT | Performed by: EMERGENCY MEDICINE

## 2021-01-01 PROCEDURE — 87040 BLOOD CULTURE FOR BACTERIA: CPT | Performed by: NURSE PRACTITIONER

## 2021-01-01 PROCEDURE — 83735 ASSAY OF MAGNESIUM: CPT | Mod: 91

## 2021-01-01 PROCEDURE — 80061 LIPID PANEL: CPT | Performed by: STUDENT IN AN ORGANIZED HEALTH CARE EDUCATION/TRAINING PROGRAM

## 2021-01-01 PROCEDURE — 25000003 PHARM REV CODE 250: Performed by: RADIOLOGY

## 2021-01-01 PROCEDURE — 80053 COMPREHEN METABOLIC PANEL: CPT | Mod: PO | Performed by: INTERNAL MEDICINE

## 2021-01-01 PROCEDURE — 97110 THERAPEUTIC EXERCISES: CPT

## 2021-01-01 PROCEDURE — 1111F PR DISCHARGE MEDS RECONCILED W/ CURRENT OUTPATIENT MED LIST: ICD-10-PCS | Mod: CPTII,S$GLB,, | Performed by: STUDENT IN AN ORGANIZED HEALTH CARE EDUCATION/TRAINING PROGRAM

## 2021-01-01 PROCEDURE — 99214 PR OFFICE/OUTPT VISIT, EST, LEVL IV, 30-39 MIN: ICD-10-PCS | Mod: 95,,, | Performed by: STUDENT IN AN ORGANIZED HEALTH CARE EDUCATION/TRAINING PROGRAM

## 2021-01-01 PROCEDURE — 99999 PR PBB SHADOW E&M-EST. PATIENT-LVL III: ICD-10-PCS | Mod: PBBFAC,,, | Performed by: PHYSICIAN ASSISTANT

## 2021-01-01 PROCEDURE — 99497 PR ADVNCD CARE PLAN 30 MIN: ICD-10-PCS | Mod: S$GLB,,, | Performed by: STUDENT IN AN ORGANIZED HEALTH CARE EDUCATION/TRAINING PROGRAM

## 2021-01-01 PROCEDURE — 83605 ASSAY OF LACTIC ACID: CPT

## 2021-01-01 PROCEDURE — 99223 1ST HOSP IP/OBS HIGH 75: CPT | Mod: ,,, | Performed by: CLINICAL NURSE SPECIALIST

## 2021-01-01 PROCEDURE — 1111F DSCHRG MED/CURRENT MED MERGE: CPT | Mod: CPTII,S$GLB,, | Performed by: STUDENT IN AN ORGANIZED HEALTH CARE EDUCATION/TRAINING PROGRAM

## 2021-01-01 PROCEDURE — 37799 UNLISTED PX VASCULAR SURGERY: CPT

## 2021-01-01 PROCEDURE — 85610 PROTHROMBIN TIME: CPT | Mod: PO | Performed by: INTERNAL MEDICINE

## 2021-01-01 PROCEDURE — 85025 COMPLETE CBC W/AUTO DIFF WBC: CPT | Mod: 91 | Performed by: INTERNAL MEDICINE

## 2021-01-01 PROCEDURE — 36569 INSJ PICC 5 YR+ W/O IMAGING: CPT

## 2021-01-01 PROCEDURE — 99497 PR ADVNCD CARE PLAN 30 MIN: ICD-10-PCS | Mod: ,,, | Performed by: INTERNAL MEDICINE

## 2021-01-01 PROCEDURE — 87070 CULTURE OTHR SPECIMN AEROBIC: CPT | Performed by: STUDENT IN AN ORGANIZED HEALTH CARE EDUCATION/TRAINING PROGRAM

## 2021-01-01 PROCEDURE — 97110 THERAPEUTIC EXERCISES: CPT | Mod: CQ

## 2021-01-01 PROCEDURE — 4010F ACE/ARB THERAPY RXD/TAKEN: CPT | Mod: CPTII,95,, | Performed by: STUDENT IN AN ORGANIZED HEALTH CARE EDUCATION/TRAINING PROGRAM

## 2021-01-01 PROCEDURE — 84295 ASSAY OF SERUM SODIUM: CPT | Mod: 91 | Performed by: NURSE PRACTITIONER

## 2021-01-01 PROCEDURE — 83540 ASSAY OF IRON: CPT

## 2021-01-01 PROCEDURE — 99999 PR PBB SHADOW E&M-EST. PATIENT-LVL I: ICD-10-PCS | Mod: PBBFAC,,,

## 2021-01-01 PROCEDURE — 86900 BLOOD TYPING SEROLOGIC ABO: CPT | Performed by: INTERNAL MEDICINE

## 2021-01-01 PROCEDURE — 94618 PULMONARY STRESS TESTING: ICD-10-PCS | Mod: S$GLB,,, | Performed by: INTERNAL MEDICINE

## 2021-01-01 PROCEDURE — 80048 BASIC METABOLIC PNL TOTAL CA: CPT | Mod: 91 | Performed by: PHYSICIAN ASSISTANT

## 2021-01-01 PROCEDURE — 82550 ASSAY OF CK (CPK): CPT | Performed by: PHYSICIAN ASSISTANT

## 2021-01-01 PROCEDURE — 1111F DSCHRG MED/CURRENT MED MERGE: CPT | Mod: CPTII,95,, | Performed by: STUDENT IN AN ORGANIZED HEALTH CARE EDUCATION/TRAINING PROGRAM

## 2021-01-01 PROCEDURE — 99223 1ST HOSP IP/OBS HIGH 75: CPT | Mod: ,,, | Performed by: PSYCHIATRY & NEUROLOGY

## 2021-01-01 PROCEDURE — 86682 HELMINTH ANTIBODY: CPT | Performed by: INTERNAL MEDICINE

## 2021-01-01 PROCEDURE — 99223 1ST HOSP IP/OBS HIGH 75: CPT | Mod: GC,,, | Performed by: STUDENT IN AN ORGANIZED HEALTH CARE EDUCATION/TRAINING PROGRAM

## 2021-01-01 PROCEDURE — 99223 1ST HOSP IP/OBS HIGH 75: CPT | Mod: GC,,, | Performed by: PSYCHIATRY & NEUROLOGY

## 2021-01-01 PROCEDURE — 99284 EMERGENCY DEPT VISIT MOD MDM: CPT | Mod: ,,, | Performed by: PHYSICIAN ASSISTANT

## 2021-01-01 PROCEDURE — 74176 CT ABD & PELVIS W/O CONTRAST: CPT | Mod: TC

## 2021-01-01 PROCEDURE — 99223 PR INITIAL HOSPITAL CARE,LEVL III: ICD-10-PCS | Mod: GC,,, | Performed by: PSYCHIATRY & NEUROLOGY

## 2021-01-01 PROCEDURE — 4010F ACE/ARB THERAPY RXD/TAKEN: CPT | Mod: CPTII,S$GLB,, | Performed by: INTERNAL MEDICINE

## 2021-01-01 PROCEDURE — 99497 PR ADVNCD CARE PLAN 30 MIN: ICD-10-PCS | Mod: 25,,, | Performed by: CLINICAL NURSE SPECIALIST

## 2021-01-01 PROCEDURE — 83615 LACTATE (LD) (LDH) ENZYME: CPT | Mod: 91 | Performed by: INTERNAL MEDICINE

## 2021-01-01 PROCEDURE — 87040 BLOOD CULTURE FOR BACTERIA: CPT | Mod: 59 | Performed by: INTERNAL MEDICINE

## 2021-01-01 PROCEDURE — 87186 SC STD MICRODIL/AGAR DIL: CPT

## 2021-01-01 PROCEDURE — 84132 ASSAY OF SERUM POTASSIUM: CPT | Mod: 91 | Performed by: NURSE PRACTITIONER

## 2021-01-01 PROCEDURE — 25000003 PHARM REV CODE 250

## 2021-01-01 PROCEDURE — 99497 ADVNCD CARE PLAN 30 MIN: CPT | Mod: 25,,, | Performed by: INTERNAL MEDICINE

## 2021-01-01 PROCEDURE — 99233 PR SUBSEQUENT HOSPITAL CARE,LEVL III: ICD-10-PCS | Mod: ,,, | Performed by: CLINICAL NURSE SPECIALIST

## 2021-01-01 PROCEDURE — 99233 SBSQ HOSP IP/OBS HIGH 50: CPT | Mod: GC,,, | Performed by: NEUROLOGICAL SURGERY

## 2021-01-01 PROCEDURE — 99281 EMR DPT VST MAYX REQ PHY/QHP: CPT | Mod: 25,ER

## 2021-01-01 PROCEDURE — 63600175 PHARM REV CODE 636 W HCPCS: Mod: JG | Performed by: PHYSICIAN ASSISTANT

## 2021-01-01 PROCEDURE — 99215 PR OFFICE/OUTPT VISIT, EST, LEVL V, 40-54 MIN: ICD-10-PCS | Mod: S$GLB,,, | Performed by: INTERNAL MEDICINE

## 2021-01-01 PROCEDURE — 85652 RBC SED RATE AUTOMATED: CPT | Performed by: PHYSICIAN ASSISTANT

## 2021-01-01 PROCEDURE — 83930 ASSAY OF BLOOD OSMOLALITY: CPT | Performed by: PSYCHIATRY & NEUROLOGY

## 2021-01-01 PROCEDURE — 84145 PROCALCITONIN (PCT): CPT | Performed by: EMERGENCY MEDICINE

## 2021-01-01 PROCEDURE — 99239 PR HOSPITAL DISCHARGE DAY,>30 MIN: ICD-10-PCS | Mod: ,,, | Performed by: INTERNAL MEDICINE

## 2021-01-01 RX ORDER — SODIUM CHLORIDE 0.9 G/100ML
IRRIGANT IRRIGATION
Status: DISCONTINUED | OUTPATIENT
Start: 2021-01-01 | End: 2021-01-01 | Stop reason: HOSPADM

## 2021-01-01 RX ORDER — WARFARIN 7.5 MG/1
7.5 TABLET ORAL DAILY
Status: DISCONTINUED | OUTPATIENT
Start: 2021-01-01 | End: 2021-01-01

## 2021-01-01 RX ORDER — SODIUM CHLORIDE 0.9 % (FLUSH) 0.9 %
10 SYRINGE (ML) INJECTION
Status: DISCONTINUED | OUTPATIENT
Start: 2021-01-01 | End: 2021-01-01 | Stop reason: HOSPADM

## 2021-01-01 RX ORDER — TALC
6 POWDER (GRAM) TOPICAL NIGHTLY PRN
Status: DISCONTINUED | OUTPATIENT
Start: 2021-01-01 | End: 2021-01-01 | Stop reason: HOSPADM

## 2021-01-01 RX ORDER — LANOLIN ALCOHOL/MO/W.PET/CERES
400 CREAM (GRAM) TOPICAL ONCE
Status: COMPLETED | OUTPATIENT
Start: 2021-01-01 | End: 2021-01-01

## 2021-01-01 RX ORDER — CYCLOBENZAPRINE HCL 5 MG
5 TABLET ORAL 3 TIMES DAILY
Status: DISCONTINUED | OUTPATIENT
Start: 2021-01-01 | End: 2021-01-01 | Stop reason: HOSPADM

## 2021-01-01 RX ORDER — POTASSIUM CHLORIDE 20 MEQ/1
20 TABLET, EXTENDED RELEASE ORAL ONCE
Status: COMPLETED | OUTPATIENT
Start: 2021-01-01 | End: 2021-01-01

## 2021-01-01 RX ORDER — WARFARIN 2 MG/1
4 TABLET ORAL DAILY
Status: DISCONTINUED | OUTPATIENT
Start: 2021-01-01 | End: 2021-01-01

## 2021-01-01 RX ORDER — WARFARIN SODIUM 5 MG/1
5 TABLET ORAL DAILY
Status: DISCONTINUED | OUTPATIENT
Start: 2021-01-01 | End: 2021-01-01 | Stop reason: HOSPADM

## 2021-01-01 RX ORDER — TRAMADOL HYDROCHLORIDE 50 MG/1
50 TABLET ORAL EVERY 4 HOURS PRN
Status: DISCONTINUED | OUTPATIENT
Start: 2021-01-01 | End: 2021-01-01 | Stop reason: HOSPADM

## 2021-01-01 RX ORDER — WARFARIN 3 MG/1
9 TABLET ORAL ONCE
Status: COMPLETED | OUTPATIENT
Start: 2021-01-01 | End: 2021-01-01

## 2021-01-01 RX ORDER — FLUDROCORTISONE ACETATE 0.1 MG/1
100 TABLET ORAL DAILY
Status: DISCONTINUED | OUTPATIENT
Start: 2021-01-01 | End: 2021-01-01

## 2021-01-01 RX ORDER — AMLODIPINE BESYLATE 10 MG/1
10 TABLET ORAL DAILY
Status: DISCONTINUED | OUTPATIENT
Start: 2021-01-01 | End: 2021-01-01 | Stop reason: HOSPADM

## 2021-01-01 RX ORDER — ONDANSETRON 2 MG/ML
INJECTION INTRAMUSCULAR; INTRAVENOUS
Status: DISCONTINUED | OUTPATIENT
Start: 2021-01-01 | End: 2021-01-01

## 2021-01-01 RX ORDER — LANOLIN ALCOHOL/MO/W.PET/CERES
800 CREAM (GRAM) TOPICAL
Status: DISCONTINUED | OUTPATIENT
Start: 2021-01-01 | End: 2021-01-01

## 2021-01-01 RX ORDER — POTASSIUM CHLORIDE 20 MEQ/1
20 TABLET, EXTENDED RELEASE ORAL DAILY
Qty: 60 TABLET | Refills: 3 | Status: ON HOLD | OUTPATIENT
Start: 2021-01-01 | End: 2021-01-01 | Stop reason: HOSPADM

## 2021-01-01 RX ORDER — MANNITOL 250 MG/ML
37.5 INJECTION, SOLUTION INTRAVENOUS ONCE
Status: COMPLETED | OUTPATIENT
Start: 2021-01-01 | End: 2021-01-01

## 2021-01-01 RX ORDER — LIDOCAINE HYDROCHLORIDE 10 MG/ML
INJECTION INFILTRATION; PERINEURAL CODE/TRAUMA/SEDATION MEDICATION
Status: COMPLETED | OUTPATIENT
Start: 2021-01-01 | End: 2021-01-01

## 2021-01-01 RX ORDER — MUPIROCIN 20 MG/G
OINTMENT TOPICAL 2 TIMES DAILY
Status: DISPENSED | OUTPATIENT
Start: 2021-01-01 | End: 2021-01-01

## 2021-01-01 RX ORDER — IBUPROFEN 200 MG
24 TABLET ORAL
Status: DISCONTINUED | OUTPATIENT
Start: 2021-01-01 | End: 2021-01-01 | Stop reason: HOSPADM

## 2021-01-01 RX ORDER — INSULIN ASPART 100 [IU]/ML
1-10 INJECTION, SOLUTION INTRAVENOUS; SUBCUTANEOUS
Status: DISCONTINUED | OUTPATIENT
Start: 2021-01-01 | End: 2021-01-01 | Stop reason: HOSPADM

## 2021-01-01 RX ORDER — BUMETANIDE 1 MG/1
1 TABLET ORAL DAILY
Status: DISCONTINUED | OUTPATIENT
Start: 2021-01-01 | End: 2021-01-01 | Stop reason: HOSPADM

## 2021-01-01 RX ORDER — ASPIRIN 325 MG
325 TABLET ORAL DAILY
Qty: 30 TABLET | Refills: 11 | Status: SHIPPED | OUTPATIENT
Start: 2021-01-01 | End: 2021-01-01 | Stop reason: SDUPTHER

## 2021-01-01 RX ORDER — CEFEPIME HYDROCHLORIDE 1 G/1
1 INJECTION, POWDER, FOR SOLUTION INTRAMUSCULAR; INTRAVENOUS
Status: DISCONTINUED | OUTPATIENT
Start: 2021-01-01 | End: 2021-01-01

## 2021-01-01 RX ORDER — SODIUM CHLORIDE 0.9 % (FLUSH) 0.9 %
10 SYRINGE (ML) INJECTION EVERY 6 HOURS
Status: DISCONTINUED | OUTPATIENT
Start: 2021-01-01 | End: 2021-01-01 | Stop reason: HOSPADM

## 2021-01-01 RX ORDER — LANOLIN ALCOHOL/MO/W.PET/CERES
800 CREAM (GRAM) TOPICAL ONCE
Status: COMPLETED | OUTPATIENT
Start: 2021-01-01 | End: 2021-01-01

## 2021-01-01 RX ORDER — KETAMINE HCL IN 0.9 % NACL 50 MG/5 ML
SYRINGE (ML) INTRAVENOUS
Status: DISCONTINUED | OUTPATIENT
Start: 2021-01-01 | End: 2021-01-01

## 2021-01-01 RX ORDER — EPINEPHRINE 0.1 MG/ML
INJECTION INTRAVENOUS
Status: DISCONTINUED | OUTPATIENT
Start: 2021-01-01 | End: 2021-01-01

## 2021-01-01 RX ORDER — WARFARIN 7.5 MG/1
TABLET ORAL
Qty: 30 TABLET | Refills: 11 | Status: SHIPPED | OUTPATIENT
Start: 2021-01-01 | End: 2021-01-01 | Stop reason: SDUPTHER

## 2021-01-01 RX ORDER — POTASSIUM CHLORIDE 20 MEQ/1
40 TABLET, EXTENDED RELEASE ORAL ONCE
Status: COMPLETED | OUTPATIENT
Start: 2021-01-01 | End: 2021-01-01

## 2021-01-01 RX ORDER — IBUPROFEN 200 MG
16 TABLET ORAL
Status: DISCONTINUED | OUTPATIENT
Start: 2021-01-01 | End: 2021-01-01 | Stop reason: HOSPADM

## 2021-01-01 RX ORDER — LISINOPRIL 5 MG/1
5 TABLET ORAL DAILY
Status: DISCONTINUED | OUTPATIENT
Start: 2021-01-01 | End: 2021-01-01 | Stop reason: HOSPADM

## 2021-01-01 RX ORDER — LANOLIN ALCOHOL/MO/W.PET/CERES
400 CREAM (GRAM) TOPICAL 2 TIMES DAILY
Status: DISCONTINUED | OUTPATIENT
Start: 2021-01-01 | End: 2021-01-01

## 2021-01-01 RX ORDER — POLYETHYLENE GLYCOL 3350 17 G/17G
17 POWDER, FOR SOLUTION ORAL DAILY
Qty: 30 EACH | Refills: 3 | Status: SHIPPED | OUTPATIENT
Start: 2021-01-01 | End: 2021-01-01 | Stop reason: HOSPADM

## 2021-01-01 RX ORDER — HYDRALAZINE HYDROCHLORIDE 50 MG/1
100 TABLET, FILM COATED ORAL EVERY 8 HOURS
Status: DISCONTINUED | OUTPATIENT
Start: 2021-01-01 | End: 2021-01-01 | Stop reason: HOSPADM

## 2021-01-01 RX ORDER — CEFAZOLIN SODIUM 1 G/50ML
SOLUTION INTRAVENOUS
Status: COMPLETED | OUTPATIENT
Start: 2021-01-01 | End: 2021-01-01

## 2021-01-01 RX ORDER — ATORVASTATIN CALCIUM 20 MG/1
20 TABLET, FILM COATED ORAL DAILY
Status: DISCONTINUED | OUTPATIENT
Start: 2021-01-01 | End: 2021-01-01 | Stop reason: HOSPADM

## 2021-01-01 RX ORDER — POTASSIUM CHLORIDE 20 MEQ/1
20 TABLET, EXTENDED RELEASE ORAL DAILY
Status: DISCONTINUED | OUTPATIENT
Start: 2021-01-01 | End: 2021-01-01

## 2021-01-01 RX ORDER — DIPHENHYDRAMINE HCL 25 MG
25 CAPSULE ORAL ONCE
Status: DISCONTINUED | OUTPATIENT
Start: 2021-01-01 | End: 2021-01-01

## 2021-01-01 RX ORDER — ASPIRIN 325 MG
325 TABLET ORAL DAILY
Status: DISCONTINUED | OUTPATIENT
Start: 2021-01-01 | End: 2021-01-01 | Stop reason: HOSPADM

## 2021-01-01 RX ORDER — LIDOCAINE HYDROCHLORIDE 20 MG/ML
SOLUTION OROPHARYNGEAL
Status: DISCONTINUED | OUTPATIENT
Start: 2021-01-01 | End: 2021-01-01

## 2021-01-01 RX ORDER — DEXMEDETOMIDINE HYDROCHLORIDE 100 UG/ML
INJECTION, SOLUTION INTRAVENOUS
Status: DISCONTINUED | OUTPATIENT
Start: 2021-01-01 | End: 2021-01-01

## 2021-01-01 RX ORDER — DIPHENHYDRAMINE HCL 50 MG
50 CAPSULE ORAL ONCE
Status: DISCONTINUED | OUTPATIENT
Start: 2021-01-01 | End: 2021-01-01 | Stop reason: HOSPADM

## 2021-01-01 RX ORDER — OXYCODONE AND ACETAMINOPHEN 5; 325 MG/1; MG/1
1 TABLET ORAL ONCE
Status: COMPLETED | OUTPATIENT
Start: 2021-01-01 | End: 2021-01-01

## 2021-01-01 RX ORDER — RIFAMPIN 300 MG/1
300 CAPSULE ORAL
Status: COMPLETED | OUTPATIENT
Start: 2021-01-01 | End: 2021-01-01

## 2021-01-01 RX ORDER — NEOSTIGMINE METHYLSULFATE 0.5 MG/ML
INJECTION, SOLUTION INTRAVENOUS
Status: DISCONTINUED | OUTPATIENT
Start: 2021-01-01 | End: 2021-01-01

## 2021-01-01 RX ORDER — ALBUTEROL SULFATE 90 UG/1
2 AEROSOL, METERED RESPIRATORY (INHALATION) EVERY 6 HOURS PRN
Status: DISCONTINUED | OUTPATIENT
Start: 2021-01-01 | End: 2021-01-01 | Stop reason: HOSPADM

## 2021-01-01 RX ORDER — MAGNESIUM SULFATE HEPTAHYDRATE 40 MG/ML
2 INJECTION, SOLUTION INTRAVENOUS ONCE
Status: COMPLETED | OUTPATIENT
Start: 2021-01-01 | End: 2021-01-01

## 2021-01-01 RX ORDER — BUMETANIDE 1 MG/1
1 TABLET ORAL 2 TIMES DAILY
Status: DISCONTINUED | OUTPATIENT
Start: 2021-01-01 | End: 2021-01-01 | Stop reason: HOSPADM

## 2021-01-01 RX ORDER — POTASSIUM CHLORIDE 20 MEQ/1
20 TABLET, EXTENDED RELEASE ORAL DAILY
Status: DISCONTINUED | OUTPATIENT
Start: 2021-01-01 | End: 2021-01-01 | Stop reason: HOSPADM

## 2021-01-01 RX ORDER — WARFARIN 7.5 MG/1
TABLET ORAL
Qty: 30 TABLET | Refills: 11
Start: 2021-01-01 | End: 2021-01-01 | Stop reason: SDUPTHER

## 2021-01-01 RX ORDER — PHENYLEPHRINE HYDROCHLORIDE 10 MG/ML
INJECTION INTRAVENOUS
Status: DISCONTINUED | OUTPATIENT
Start: 2021-01-01 | End: 2021-01-01

## 2021-01-01 RX ORDER — BUMETANIDE 1 MG/1
1 TABLET ORAL DAILY
Status: DISCONTINUED | OUTPATIENT
Start: 2021-01-01 | End: 2021-01-01

## 2021-01-01 RX ORDER — ETOMIDATE 2 MG/ML
INJECTION INTRAVENOUS
Status: DISCONTINUED | OUTPATIENT
Start: 2021-01-01 | End: 2021-01-01

## 2021-01-01 RX ORDER — BUMETANIDE 1 MG/1
1 TABLET ORAL DAILY
Qty: 90 TABLET | Refills: 3
Start: 2021-01-01 | End: 2021-01-01

## 2021-01-01 RX ORDER — SPIRONOLACTONE 25 MG/1
25 TABLET ORAL DAILY
Status: DISCONTINUED | OUTPATIENT
Start: 2021-01-01 | End: 2021-01-01

## 2021-01-01 RX ORDER — WARFARIN SODIUM 5 MG/1
5 TABLET ORAL DAILY
Status: DISCONTINUED | OUTPATIENT
Start: 2021-01-01 | End: 2021-01-01

## 2021-01-01 RX ORDER — VANCOMYCIN HYDROCHLORIDE 1 G/20ML
INJECTION, POWDER, LYOPHILIZED, FOR SOLUTION INTRAVENOUS
Status: DISCONTINUED | OUTPATIENT
Start: 2021-01-01 | End: 2021-01-01

## 2021-01-01 RX ORDER — VANCOMYCIN HCL IN 5 % DEXTROSE 1G/250ML
1000 PLASTIC BAG, INJECTION (ML) INTRAVENOUS
Status: DISCONTINUED | OUTPATIENT
Start: 2021-01-01 | End: 2021-01-01 | Stop reason: HOSPADM

## 2021-01-01 RX ORDER — DIPHENHYDRAMINE HCL 25 MG
25 CAPSULE ORAL EVERY 6 HOURS PRN
Status: DISCONTINUED | OUTPATIENT
Start: 2021-01-01 | End: 2021-01-01 | Stop reason: HOSPADM

## 2021-01-01 RX ORDER — SODIUM CHLORIDE 9 MG/ML
INJECTION, SOLUTION INTRAVENOUS
Status: DISCONTINUED | OUTPATIENT
Start: 2021-01-01 | End: 2021-01-01 | Stop reason: HOSPADM

## 2021-01-01 RX ORDER — FENTANYL CITRATE 50 UG/ML
25 INJECTION, SOLUTION INTRAMUSCULAR; INTRAVENOUS EVERY 5 MIN PRN
Status: DISCONTINUED | OUTPATIENT
Start: 2021-01-01 | End: 2021-01-01 | Stop reason: HOSPADM

## 2021-01-01 RX ORDER — BUMETANIDE 1 MG/1
1 TABLET ORAL 2 TIMES DAILY
Status: DISCONTINUED | OUTPATIENT
Start: 2021-01-01 | End: 2021-01-01

## 2021-01-01 RX ORDER — WARFARIN 7.5 MG/1
7.5 TABLET ORAL ONCE
Status: COMPLETED | OUTPATIENT
Start: 2021-01-01 | End: 2021-01-01

## 2021-01-01 RX ORDER — HYDROCODONE BITARTRATE AND ACETAMINOPHEN 500; 5 MG/1; MG/1
TABLET ORAL
Status: DISCONTINUED | OUTPATIENT
Start: 2021-01-01 | End: 2021-01-01 | Stop reason: HOSPADM

## 2021-01-01 RX ORDER — ONDANSETRON 8 MG/1
8 TABLET, ORALLY DISINTEGRATING ORAL EVERY 8 HOURS PRN
Status: DISCONTINUED | OUTPATIENT
Start: 2021-01-01 | End: 2021-01-01

## 2021-01-01 RX ORDER — OXYCODONE HYDROCHLORIDE 5 MG/1
5 TABLET ORAL EVERY 8 HOURS PRN
Qty: 15 TABLET | Refills: 0 | Status: SHIPPED | OUTPATIENT
Start: 2021-01-01 | End: 2021-01-01

## 2021-01-01 RX ORDER — ACETAMINOPHEN 325 MG/1
650 TABLET ORAL ONCE
Status: COMPLETED | OUTPATIENT
Start: 2021-01-01 | End: 2021-01-01

## 2021-01-01 RX ORDER — DOXYCYCLINE 50 MG/1
100 CAPSULE ORAL 2 TIMES DAILY
Status: DISCONTINUED | OUTPATIENT
Start: 2021-01-01 | End: 2021-01-01

## 2021-01-01 RX ORDER — TALC
9 POWDER (GRAM) TOPICAL NIGHTLY
Status: DISCONTINUED | OUTPATIENT
Start: 2021-01-01 | End: 2021-01-01 | Stop reason: HOSPADM

## 2021-01-01 RX ORDER — PREDNISONE 50 MG/1
50 TABLET ORAL ONCE
Status: DISCONTINUED | OUTPATIENT
Start: 2021-01-01 | End: 2021-01-01 | Stop reason: HOSPADM

## 2021-01-01 RX ORDER — WARFARIN 7.5 MG/1
TABLET ORAL
Qty: 30 TABLET | Refills: 11
Start: 2021-01-01 | End: 2021-01-01

## 2021-01-01 RX ORDER — WARFARIN 7.5 MG/1
7.5 TABLET ORAL DAILY
Status: DISCONTINUED | OUTPATIENT
Start: 2021-01-01 | End: 2021-01-01 | Stop reason: HOSPADM

## 2021-01-01 RX ORDER — POTASSIUM CHLORIDE 20 MEQ/1
20 TABLET, EXTENDED RELEASE ORAL 2 TIMES DAILY
Status: DISCONTINUED | OUTPATIENT
Start: 2021-01-01 | End: 2021-01-01

## 2021-01-01 RX ORDER — LIDOCAINE HYDROCHLORIDE 20 MG/ML
INJECTION INTRAVENOUS
Status: DISCONTINUED | OUTPATIENT
Start: 2021-01-01 | End: 2021-01-01

## 2021-01-01 RX ORDER — GLUCAGON 1 MG
1 KIT INJECTION
Status: DISCONTINUED | OUTPATIENT
Start: 2021-01-01 | End: 2021-01-01 | Stop reason: HOSPADM

## 2021-01-01 RX ORDER — MIDAZOLAM HYDROCHLORIDE 1 MG/ML
INJECTION, SOLUTION INTRAMUSCULAR; INTRAVENOUS
Status: DISCONTINUED | OUTPATIENT
Start: 2021-01-01 | End: 2021-01-01

## 2021-01-01 RX ORDER — OXYCODONE HYDROCHLORIDE 5 MG/1
5 TABLET ORAL EVERY 8 HOURS PRN
Status: DISCONTINUED | OUTPATIENT
Start: 2021-01-01 | End: 2021-01-01

## 2021-01-01 RX ORDER — SPIRONOLACTONE 25 MG/1
25 TABLET ORAL DAILY
Qty: 30 TABLET | Refills: 11 | Status: SHIPPED | OUTPATIENT
Start: 2021-01-01 | End: 2021-01-01 | Stop reason: SDUPTHER

## 2021-01-01 RX ORDER — SPIRONOLACTONE 25 MG/1
25 TABLET ORAL DAILY
Status: DISCONTINUED | OUTPATIENT
Start: 2021-01-01 | End: 2021-01-01 | Stop reason: HOSPADM

## 2021-01-01 RX ORDER — MUPIROCIN 20 MG/G
OINTMENT TOPICAL 2 TIMES DAILY
Status: DISCONTINUED | OUTPATIENT
Start: 2021-01-01 | End: 2021-01-01 | Stop reason: HOSPADM

## 2021-01-01 RX ORDER — ACETAMINOPHEN 325 MG/1
650 TABLET ORAL EVERY 6 HOURS PRN
Status: DISCONTINUED | OUTPATIENT
Start: 2021-01-01 | End: 2021-01-01 | Stop reason: HOSPADM

## 2021-01-01 RX ORDER — DIPHENHYDRAMINE HYDROCHLORIDE 50 MG/ML
25 INJECTION INTRAMUSCULAR; INTRAVENOUS ONCE
Status: COMPLETED | OUTPATIENT
Start: 2021-01-01 | End: 2021-01-01

## 2021-01-01 RX ORDER — SPIRONOLACTONE 25 MG/1
25 TABLET ORAL DAILY
Qty: 30 TABLET | Refills: 11 | Status: ON HOLD | OUTPATIENT
Start: 2021-01-01 | End: 2021-01-01 | Stop reason: HOSPADM

## 2021-01-01 RX ORDER — DOXYCYCLINE HYCLATE 100 MG
100 TABLET ORAL EVERY 12 HOURS
Status: DISCONTINUED | OUTPATIENT
Start: 2021-01-01 | End: 2021-01-01 | Stop reason: HOSPADM

## 2021-01-01 RX ORDER — OXYCODONE HYDROCHLORIDE 5 MG/1
5 TABLET ORAL EVERY 6 HOURS PRN
Status: DISCONTINUED | OUTPATIENT
Start: 2021-01-01 | End: 2021-01-01 | Stop reason: HOSPADM

## 2021-01-01 RX ORDER — HEPARIN SODIUM,PORCINE/D5W 25000/250
0-40 INTRAVENOUS SOLUTION INTRAVENOUS CONTINUOUS
Status: DISCONTINUED | OUTPATIENT
Start: 2021-01-01 | End: 2021-01-01

## 2021-01-01 RX ORDER — WARFARIN 2 MG/1
4 TABLET ORAL ONCE
Status: DISCONTINUED | OUTPATIENT
Start: 2021-01-01 | End: 2021-01-01

## 2021-01-01 RX ORDER — ATORVASTATIN CALCIUM 20 MG/1
20 TABLET, FILM COATED ORAL DAILY
Qty: 30 TABLET | Refills: 11 | Status: ON HOLD | OUTPATIENT
Start: 2021-01-01 | End: 2021-01-01 | Stop reason: HOSPADM

## 2021-01-01 RX ORDER — POTASSIUM CHLORIDE 20 MEQ/1
40 TABLET, EXTENDED RELEASE ORAL
Status: DISCONTINUED | OUTPATIENT
Start: 2021-01-01 | End: 2021-01-01

## 2021-01-01 RX ORDER — HYDRALAZINE HYDROCHLORIDE 20 MG/ML
10 INJECTION INTRAMUSCULAR; INTRAVENOUS EVERY 6 HOURS PRN
Status: DISCONTINUED | OUTPATIENT
Start: 2021-01-01 | End: 2021-01-01 | Stop reason: HOSPADM

## 2021-01-01 RX ORDER — CYCLOBENZAPRINE HCL 5 MG
5 TABLET ORAL 3 TIMES DAILY PRN
Status: DISCONTINUED | OUTPATIENT
Start: 2021-01-01 | End: 2021-01-01

## 2021-01-01 RX ORDER — ATORVASTATIN CALCIUM 20 MG/1
20 TABLET, FILM COATED ORAL DAILY
Qty: 30 TABLET | Refills: 11 | Status: SHIPPED | OUTPATIENT
Start: 2021-01-01 | End: 2021-01-01 | Stop reason: SDUPTHER

## 2021-01-01 RX ORDER — ATORVASTATIN CALCIUM 20 MG/1
20 TABLET, FILM COATED ORAL DAILY
Status: DISCONTINUED | OUTPATIENT
Start: 2021-01-01 | End: 2021-01-01

## 2021-01-01 RX ORDER — LEVETIRACETAM 5 MG/ML
500 INJECTION INTRAVASCULAR EVERY 12 HOURS
Status: DISPENSED | OUTPATIENT
Start: 2021-01-01 | End: 2021-01-01

## 2021-01-01 RX ORDER — RIFAMPIN 300 MG/1
300 CAPSULE ORAL DAILY
Status: DISCONTINUED | OUTPATIENT
Start: 2021-01-01 | End: 2021-01-01 | Stop reason: HOSPADM

## 2021-01-01 RX ORDER — DIPHENHYDRAMINE HCL 25 MG
25 CAPSULE ORAL ONCE
Status: COMPLETED | OUTPATIENT
Start: 2021-01-01 | End: 2021-01-01

## 2021-01-01 RX ORDER — POTASSIUM CHLORIDE 750 MG/1
30 CAPSULE, EXTENDED RELEASE ORAL
Status: COMPLETED | OUTPATIENT
Start: 2021-01-01 | End: 2021-01-01

## 2021-01-01 RX ORDER — BENZONATATE 100 MG/1
100 CAPSULE ORAL 3 TIMES DAILY PRN
Status: DISCONTINUED | OUTPATIENT
Start: 2021-01-01 | End: 2021-01-01 | Stop reason: HOSPADM

## 2021-01-01 RX ORDER — ASPIRIN 325 MG
325 TABLET ORAL DAILY
Qty: 30 TABLET | Refills: 11 | Status: ON HOLD | OUTPATIENT
Start: 2021-01-01 | End: 2021-01-01 | Stop reason: HOSPADM

## 2021-01-01 RX ORDER — METOPROLOL TARTRATE 1 MG/ML
25 INJECTION, SOLUTION INTRAVENOUS DAILY
Status: DISCONTINUED | OUTPATIENT
Start: 2021-01-01 | End: 2021-01-01

## 2021-01-01 RX ORDER — ENOXAPARIN SODIUM 100 MG/ML
50 INJECTION SUBCUTANEOUS EVERY 12 HOURS
Qty: 10 SYRINGE | Refills: 1 | Status: SHIPPED | OUTPATIENT
Start: 2021-01-01 | End: 2021-01-01 | Stop reason: SDUPTHER

## 2021-01-01 RX ORDER — WARFARIN 2 MG/1
2 TABLET ORAL ONCE
Status: COMPLETED | OUTPATIENT
Start: 2021-01-01 | End: 2021-01-01

## 2021-01-01 RX ORDER — ONDANSETRON 2 MG/ML
4 INJECTION INTRAMUSCULAR; INTRAVENOUS DAILY PRN
Status: DISCONTINUED | OUTPATIENT
Start: 2021-01-01 | End: 2021-01-01 | Stop reason: HOSPADM

## 2021-01-01 RX ORDER — OXYCODONE HCL 5 MG/5 ML
2.5 SOLUTION, ORAL ORAL ONCE
Status: COMPLETED | OUTPATIENT
Start: 2021-01-01 | End: 2021-01-01

## 2021-01-01 RX ORDER — RIFAMPIN 300 MG/1
300 CAPSULE ORAL DAILY
Qty: 30 CAPSULE | Refills: 11 | Status: ON HOLD | OUTPATIENT
Start: 2021-01-01 | End: 2021-01-01 | Stop reason: HOSPADM

## 2021-01-01 RX ORDER — WARFARIN 7.5 MG/1
TABLET ORAL
Qty: 30 TABLET | Refills: 11 | Status: ON HOLD
Start: 2021-01-01 | End: 2021-01-01 | Stop reason: SDUPTHER

## 2021-01-01 RX ORDER — VANCOMYCIN HCL IN 5 % DEXTROSE 1G/250ML
1000 PLASTIC BAG, INJECTION (ML) INTRAVENOUS
Status: DISCONTINUED | OUTPATIENT
Start: 2021-01-01 | End: 2021-01-01

## 2021-01-01 RX ORDER — ENOXAPARIN SODIUM 100 MG/ML
50 INJECTION SUBCUTANEOUS EVERY 12 HOURS
Qty: 10 SYRINGE | Refills: 1 | Status: ON HOLD | OUTPATIENT
Start: 2021-01-01 | End: 2021-01-01 | Stop reason: HOSPADM

## 2021-01-01 RX ORDER — AMLODIPINE BESYLATE 10 MG/1
10 TABLET ORAL DAILY
Qty: 30 TABLET | Refills: 11 | Status: ON HOLD | OUTPATIENT
Start: 2021-01-01 | End: 2022-01-01 | Stop reason: HOSPADM

## 2021-01-01 RX ORDER — HEPARIN SODIUM,PORCINE/D5W 25000/250
22 INTRAVENOUS SOLUTION INTRAVENOUS CONTINUOUS
Status: DISPENSED | OUTPATIENT
Start: 2021-01-01 | End: 2021-01-01

## 2021-01-01 RX ORDER — VANCOMYCIN HCL IN 5 % DEXTROSE 1G/250ML
1000 PLASTIC BAG, INJECTION (ML) INTRAVENOUS EVERY 12 HOURS
Status: ON HOLD
Start: 2021-01-01 | End: 2021-01-01 | Stop reason: HOSPADM

## 2021-01-01 RX ORDER — AMLODIPINE BESYLATE 10 MG/1
10 TABLET ORAL DAILY
Qty: 30 TABLET | Refills: 11 | Status: SHIPPED | OUTPATIENT
Start: 2021-01-01 | End: 2021-01-01 | Stop reason: SDUPTHER

## 2021-01-01 RX ORDER — WARFARIN 3 MG/1
6 TABLET ORAL DAILY
Status: DISCONTINUED | OUTPATIENT
Start: 2021-01-01 | End: 2021-01-01

## 2021-01-01 RX ORDER — ASPIRIN 325 MG
325 TABLET ORAL DAILY
Status: DISCONTINUED | OUTPATIENT
Start: 2021-01-01 | End: 2021-01-01

## 2021-01-01 RX ORDER — WARFARIN 7.5 MG/1
TABLET ORAL
Qty: 30 TABLET | Refills: 11 | Status: ON HOLD | OUTPATIENT
Start: 2021-01-01 | End: 2021-01-01 | Stop reason: SDUPTHER

## 2021-01-01 RX ORDER — POLYETHYLENE GLYCOL 3350 17 G/17G
17 POWDER, FOR SOLUTION ORAL DAILY
Status: DISCONTINUED | OUTPATIENT
Start: 2021-01-01 | End: 2021-01-01 | Stop reason: HOSPADM

## 2021-01-01 RX ORDER — LANOLIN ALCOHOL/MO/W.PET/CERES
400 CREAM (GRAM) TOPICAL 2 TIMES DAILY
Qty: 60 TABLET | Refills: 11 | Status: ON HOLD | COMMUNITY
Start: 2021-01-01 | End: 2022-01-01 | Stop reason: HOSPADM

## 2021-01-01 RX ORDER — BISACODYL 10 MG
10 SUPPOSITORY, RECTAL RECTAL DAILY PRN
Status: DISCONTINUED | OUTPATIENT
Start: 2021-01-01 | End: 2021-01-01 | Stop reason: HOSPADM

## 2021-01-01 RX ORDER — DRONABINOL 2.5 MG/1
2.5 CAPSULE ORAL DAILY
Status: DISCONTINUED | OUTPATIENT
Start: 2021-01-01 | End: 2021-01-01 | Stop reason: HOSPADM

## 2021-01-01 RX ORDER — ROCURONIUM BROMIDE 10 MG/ML
INJECTION, SOLUTION INTRAVENOUS
Status: DISCONTINUED | OUTPATIENT
Start: 2021-01-01 | End: 2021-01-01

## 2021-01-01 RX ORDER — IODIXANOL 320 MG/ML
300 INJECTION, SOLUTION INTRAVASCULAR
Status: COMPLETED | OUTPATIENT
Start: 2021-01-01 | End: 2021-01-01

## 2021-01-01 RX ORDER — DOXYCYCLINE 100 MG/1
100 CAPSULE ORAL EVERY 12 HOURS
Qty: 28 CAPSULE | Refills: 0 | Status: SHIPPED | OUTPATIENT
Start: 2021-01-01 | End: 2021-01-01 | Stop reason: SDUPTHER

## 2021-01-01 RX ORDER — BUPIVACAINE HYDROCHLORIDE 2.5 MG/ML
INJECTION, SOLUTION EPIDURAL; INFILTRATION; INTRACAUDAL
Status: DISCONTINUED | OUTPATIENT
Start: 2021-01-01 | End: 2021-01-01 | Stop reason: HOSPADM

## 2021-01-01 RX ORDER — WARFARIN SODIUM 5 MG/1
10 TABLET ORAL
Status: COMPLETED | OUTPATIENT
Start: 2021-01-01 | End: 2021-01-01

## 2021-01-01 RX ORDER — LANOLIN ALCOHOL/MO/W.PET/CERES
400 CREAM (GRAM) TOPICAL 3 TIMES DAILY
Status: DISCONTINUED | OUTPATIENT
Start: 2021-01-01 | End: 2021-01-01 | Stop reason: HOSPADM

## 2021-01-01 RX ORDER — LISINOPRIL 5 MG/1
5 TABLET ORAL DAILY
Status: DISCONTINUED | OUTPATIENT
Start: 2021-01-01 | End: 2021-01-01

## 2021-01-01 RX ORDER — HEPARIN SODIUM,PORCINE/D5W 25000/250
20 INTRAVENOUS SOLUTION INTRAVENOUS CONTINUOUS
Status: DISCONTINUED | OUTPATIENT
Start: 2021-01-01 | End: 2021-01-01

## 2021-01-01 RX ORDER — DOXYCYCLINE 100 MG/1
100 CAPSULE ORAL EVERY 12 HOURS
Qty: 60 CAPSULE | Refills: 1 | Status: ON HOLD | OUTPATIENT
Start: 2021-01-01 | End: 2021-01-01 | Stop reason: HOSPADM

## 2021-01-01 RX ORDER — WARFARIN 7.5 MG/1
7.5 TABLET ORAL
Status: DISCONTINUED | OUTPATIENT
Start: 2021-01-01 | End: 2021-01-01

## 2021-01-01 RX ORDER — POTASSIUM CHLORIDE 750 MG/1
30 CAPSULE, EXTENDED RELEASE ORAL
Status: DISCONTINUED | OUTPATIENT
Start: 2021-01-01 | End: 2021-01-01

## 2021-01-01 RX ORDER — DIPHENHYDRAMINE HYDROCHLORIDE 50 MG/ML
50 INJECTION INTRAMUSCULAR; INTRAVENOUS
Status: DISCONTINUED | OUTPATIENT
Start: 2021-01-01 | End: 2021-01-01 | Stop reason: HOSPADM

## 2021-01-01 RX ORDER — LIDOCAINE 50 MG/G
1 PATCH TOPICAL
Status: DISCONTINUED | OUTPATIENT
Start: 2021-01-01 | End: 2021-01-01 | Stop reason: HOSPADM

## 2021-01-01 RX ORDER — WARFARIN 2.5 MG/1
2.5 TABLET ORAL ONCE
Status: COMPLETED | OUTPATIENT
Start: 2021-01-01 | End: 2021-01-01

## 2021-01-01 RX ORDER — LOPERAMIDE HYDROCHLORIDE 2 MG/1
2 CAPSULE ORAL ONCE
Status: DISCONTINUED | OUTPATIENT
Start: 2021-01-01 | End: 2021-01-01

## 2021-01-01 RX ORDER — MAGNESIUM SULFATE 1 G/100ML
1 INJECTION INTRAVENOUS ONCE
Status: COMPLETED | OUTPATIENT
Start: 2021-01-01 | End: 2021-01-01

## 2021-01-01 RX ORDER — ACETAMINOPHEN 500 MG
1000 TABLET ORAL
Status: COMPLETED | OUTPATIENT
Start: 2021-01-01 | End: 2021-01-01

## 2021-01-01 RX ORDER — ONDANSETRON 2 MG/ML
8 INJECTION INTRAMUSCULAR; INTRAVENOUS EVERY 8 HOURS PRN
Status: DISCONTINUED | OUTPATIENT
Start: 2021-01-01 | End: 2021-01-01 | Stop reason: HOSPADM

## 2021-01-01 RX ORDER — LEVETIRACETAM 5 MG/ML
500 INJECTION INTRAVASCULAR EVERY 12 HOURS
Status: DISCONTINUED | OUTPATIENT
Start: 2021-01-01 | End: 2021-01-01

## 2021-01-01 RX ORDER — LIDOCAINE HYDROCHLORIDE 10 MG/ML
1 INJECTION INFILTRATION; PERINEURAL ONCE
Status: COMPLETED | OUTPATIENT
Start: 2021-01-01 | End: 2021-01-01

## 2021-01-01 RX ORDER — DOCUSATE SODIUM 100 MG/1
100 CAPSULE, LIQUID FILLED ORAL 2 TIMES DAILY PRN
Status: DISCONTINUED | OUTPATIENT
Start: 2021-01-01 | End: 2021-01-01 | Stop reason: HOSPADM

## 2021-01-01 RX ORDER — FENTANYL CITRATE 50 UG/ML
INJECTION, SOLUTION INTRAMUSCULAR; INTRAVENOUS CODE/TRAUMA/SEDATION MEDICATION
Status: COMPLETED | OUTPATIENT
Start: 2021-01-01 | End: 2021-01-01

## 2021-01-01 RX ORDER — AMLODIPINE BESYLATE 10 MG/1
10 TABLET ORAL DAILY
Status: DISCONTINUED | OUTPATIENT
Start: 2021-01-01 | End: 2021-01-01

## 2021-01-01 RX ORDER — ZOLPIDEM TARTRATE 5 MG/1
5 TABLET ORAL ONCE
Status: COMPLETED | OUTPATIENT
Start: 2021-01-01 | End: 2021-01-01

## 2021-01-01 RX ORDER — PREDNISONE 50 MG/1
50 TABLET ORAL
Status: DISCONTINUED | OUTPATIENT
Start: 2021-01-01 | End: 2021-01-01 | Stop reason: HOSPADM

## 2021-01-01 RX ORDER — LIDOCAINE HYDROCHLORIDE 20 MG/ML
INJECTION, SOLUTION INFILTRATION; PERINEURAL
Status: DISCONTINUED | OUTPATIENT
Start: 2021-01-01 | End: 2021-01-01 | Stop reason: HOSPADM

## 2021-01-01 RX ORDER — ZOLPIDEM TARTRATE 5 MG/1
5 TABLET ORAL NIGHTLY PRN
Status: DISCONTINUED | OUTPATIENT
Start: 2021-01-01 | End: 2021-01-01 | Stop reason: HOSPADM

## 2021-01-01 RX ORDER — ATORVASTATIN CALCIUM 10 MG/1
20 TABLET, FILM COATED ORAL DAILY
Status: DISCONTINUED | OUTPATIENT
Start: 2021-01-01 | End: 2021-01-01

## 2021-01-01 RX ORDER — DOXYCYCLINE 100 MG/1
100 CAPSULE ORAL EVERY 12 HOURS
Qty: 90 CAPSULE | Refills: 1 | Status: ON HOLD | OUTPATIENT
Start: 2021-01-01 | End: 2022-01-01 | Stop reason: HOSPADM

## 2021-01-01 RX ADMIN — HEPARIN SODIUM AND DEXTROSE 14 UNITS/KG/HR: 10000; 5 INJECTION INTRAVENOUS at 04:03

## 2021-01-01 RX ADMIN — VANCOMYCIN HYDROCHLORIDE 1750 MG: 10 INJECTION, POWDER, LYOPHILIZED, FOR SOLUTION INTRAVENOUS at 05:07

## 2021-01-01 RX ADMIN — ONDANSETRON 4 MG: 2 INJECTION INTRAMUSCULAR; INTRAVENOUS at 11:08

## 2021-01-01 RX ADMIN — POTASSIUM CHLORIDE 20 MEQ: 1500 TABLET, EXTENDED RELEASE ORAL at 08:07

## 2021-01-01 RX ADMIN — FLUDROCORTISONE ACETATE 100 MCG: 0.1 TABLET ORAL at 08:08

## 2021-01-01 RX ADMIN — DIPHENHYDRAMINE HYDROCHLORIDE 25 MG: 25 CAPSULE ORAL at 09:03

## 2021-01-01 RX ADMIN — Medication 400 MG: at 09:10

## 2021-01-01 RX ADMIN — WARFARIN SODIUM 5 MG: 5 TABLET ORAL at 06:10

## 2021-01-01 RX ADMIN — HYDRALAZINE HYDROCHLORIDE 100 MG: 50 TABLET ORAL at 02:08

## 2021-01-01 RX ADMIN — CEFTAROLINE FOSAMIL 600 MG: 600 POWDER, FOR SOLUTION INTRAVENOUS at 09:08

## 2021-01-01 RX ADMIN — BUMETANIDE 1 MG: 1 TABLET ORAL at 08:07

## 2021-01-01 RX ADMIN — AMLODIPINE BESYLATE 10 MG: 10 TABLET ORAL at 09:04

## 2021-01-01 RX ADMIN — OXYCODONE HYDROCHLORIDE 5 MG: 5 TABLET ORAL at 11:02

## 2021-01-01 RX ADMIN — LEVETIRACETAM 500 MG: 5 INJECTION INTRAVENOUS at 08:07

## 2021-01-01 RX ADMIN — HEPARIN SODIUM AND DEXTROSE 14 UNITS/KG/HR: 10000; 5 INJECTION INTRAVENOUS at 08:04

## 2021-01-01 RX ADMIN — AMLODIPINE BESYLATE 10 MG: 10 TABLET ORAL at 08:02

## 2021-01-01 RX ADMIN — DIPHENHYDRAMINE HYDROCHLORIDE 25 MG: 25 CAPSULE ORAL at 09:08

## 2021-01-01 RX ADMIN — VANCOMYCIN HYDROCHLORIDE 1000 MG: 1 INJECTION, POWDER, LYOPHILIZED, FOR SOLUTION INTRAVENOUS at 11:03

## 2021-01-01 RX ADMIN — Medication 2 G: at 09:08

## 2021-01-01 RX ADMIN — HEPARIN SODIUM AND DEXTROSE 12 UNITS/KG/HR: 10000; 5 INJECTION INTRAVENOUS at 11:03

## 2021-01-01 RX ADMIN — POTASSIUM CHLORIDE 20 MEQ: 1500 TABLET, EXTENDED RELEASE ORAL at 08:08

## 2021-01-01 RX ADMIN — CEFTAROLINE FOSAMIL 600 MG: 600 POWDER, FOR SOLUTION INTRAVENOUS at 06:08

## 2021-01-01 RX ADMIN — DOCUSATE SODIUM 50 MG: 50 CAPSULE, LIQUID FILLED ORAL at 08:08

## 2021-01-01 RX ADMIN — AMLODIPINE BESYLATE 10 MG: 10 TABLET ORAL at 09:08

## 2021-01-01 RX ADMIN — Medication 2 G: at 04:08

## 2021-01-01 RX ADMIN — ETOMIDATE 6 MG: 2 INJECTION, SOLUTION INTRAVENOUS at 12:08

## 2021-01-01 RX ADMIN — AMLODIPINE BESYLATE 10 MG: 10 TABLET ORAL at 08:04

## 2021-01-01 RX ADMIN — ATORVASTATIN CALCIUM 20 MG: 20 TABLET, FILM COATED ORAL at 08:07

## 2021-01-01 RX ADMIN — HYDRALAZINE HYDROCHLORIDE 100 MG: 50 TABLET ORAL at 09:07

## 2021-01-01 RX ADMIN — HYDRALAZINE HYDROCHLORIDE 100 MG: 50 TABLET, FILM COATED ORAL at 09:03

## 2021-01-01 RX ADMIN — GLYCOPYRROLATE 0.4 MG: 0.2 INJECTION, SOLUTION INTRAMUSCULAR; INTRAVENOUS at 12:08

## 2021-01-01 RX ADMIN — DRONABINOL 2.5 MG: 2.5 CAPSULE ORAL at 08:07

## 2021-01-01 RX ADMIN — POTASSIUM CHLORIDE 20 MEQ: 1500 TABLET, EXTENDED RELEASE ORAL at 09:05

## 2021-01-01 RX ADMIN — VANCOMYCIN HYDROCHLORIDE 1750 MG: 10 INJECTION, POWDER, LYOPHILIZED, FOR SOLUTION INTRAVENOUS at 04:07

## 2021-01-01 RX ADMIN — AMLODIPINE BESYLATE 10 MG: 10 TABLET ORAL at 09:03

## 2021-01-01 RX ADMIN — DOCUSATE SODIUM 50 MG: 50 CAPSULE, LIQUID FILLED ORAL at 09:08

## 2021-01-01 RX ADMIN — SODIUM CHLORIDE: 234 INJECTION INTRAMUSCULAR; INTRAVENOUS; SUBCUTANEOUS at 02:07

## 2021-01-01 RX ADMIN — INSULIN ASPART 1 UNITS: 100 INJECTION, SOLUTION INTRAVENOUS; SUBCUTANEOUS at 10:03

## 2021-01-01 RX ADMIN — OXYCODONE 5 MG: 5 TABLET ORAL at 08:02

## 2021-01-01 RX ADMIN — MUPIROCIN: 20 OINTMENT TOPICAL at 08:04

## 2021-01-01 RX ADMIN — WARFARIN SODIUM 7.5 MG: 7.5 TABLET ORAL at 04:05

## 2021-01-01 RX ADMIN — ATORVASTATIN CALCIUM 20 MG: 20 TABLET, FILM COATED ORAL at 08:05

## 2021-01-01 RX ADMIN — ATORVASTATIN CALCIUM 20 MG: 20 TABLET, FILM COATED ORAL at 08:02

## 2021-01-01 RX ADMIN — BUMETANIDE 1 MG: 1 TABLET ORAL at 05:05

## 2021-01-01 RX ADMIN — VANCOMYCIN HYDROCHLORIDE 1500 MG: 1.5 INJECTION, POWDER, LYOPHILIZED, FOR SOLUTION INTRAVENOUS at 05:07

## 2021-01-01 RX ADMIN — HYDRALAZINE HYDROCHLORIDE 100 MG: 50 TABLET, FILM COATED ORAL at 10:03

## 2021-01-01 RX ADMIN — AMLODIPINE BESYLATE 10 MG: 10 TABLET ORAL at 08:07

## 2021-01-01 RX ADMIN — WARFARIN SODIUM 11.25 MG: 10 TABLET ORAL at 06:04

## 2021-01-01 RX ADMIN — CEFTAROLINE FOSAMIL 600 MG: 600 POWDER, FOR SOLUTION INTRAVENOUS at 11:08

## 2021-01-01 RX ADMIN — DOCUSATE SODIUM 50 MG: 50 CAPSULE, LIQUID FILLED ORAL at 08:07

## 2021-01-01 RX ADMIN — AMLODIPINE BESYLATE 10 MG: 10 TABLET ORAL at 09:07

## 2021-01-01 RX ADMIN — POTASSIUM CHLORIDE 20 MEQ: 1500 TABLET, EXTENDED RELEASE ORAL at 09:08

## 2021-01-01 RX ADMIN — HYDRALAZINE HYDROCHLORIDE 100 MG: 50 TABLET, FILM COATED ORAL at 03:02

## 2021-01-01 RX ADMIN — DOXYCYCLINE HYCLATE 100 MG: 100 TABLET, COATED ORAL at 09:10

## 2021-01-01 RX ADMIN — PHENYLEPHRINE HYDROCHLORIDE 200 MCG: 10 INJECTION, SOLUTION INTRAMUSCULAR; INTRAVENOUS; SUBCUTANEOUS at 03:05

## 2021-01-01 RX ADMIN — POLYETHYLENE GLYCOL 3350 17 G: 17 POWDER, FOR SOLUTION ORAL at 08:08

## 2021-01-01 RX ADMIN — MELATONIN TAB 3 MG 9 MG: 3 TAB at 08:08

## 2021-01-01 RX ADMIN — LISINOPRIL 5 MG: 5 TABLET ORAL at 08:09

## 2021-01-01 RX ADMIN — CEFEPIME 1 G: 1 INJECTION, POWDER, FOR SOLUTION INTRAMUSCULAR; INTRAVENOUS at 05:07

## 2021-01-01 RX ADMIN — ACETAMINOPHEN 650 MG: 325 TABLET ORAL at 08:07

## 2021-01-01 RX ADMIN — SODIUM ZIRCONIUM CYCLOSILICATE 10 G: 10 POWDER, FOR SUSPENSION ORAL at 06:02

## 2021-01-01 RX ADMIN — AMLODIPINE BESYLATE 10 MG: 10 TABLET ORAL at 09:10

## 2021-01-01 RX ADMIN — HYDRALAZINE HYDROCHLORIDE 100 MG: 50 TABLET, FILM COATED ORAL at 02:03

## 2021-01-01 RX ADMIN — PHENYLEPHRINE HYDROCHLORIDE 100 MCG: 10 INJECTION, SOLUTION INTRAMUSCULAR; INTRAVENOUS; SUBCUTANEOUS at 10:08

## 2021-01-01 RX ADMIN — DAPTOMYCIN 900 MG: 350 INJECTION, POWDER, LYOPHILIZED, FOR SOLUTION INTRAVENOUS at 03:08

## 2021-01-01 RX ADMIN — RIFAMPIN 300 MG: 300 CAPSULE ORAL at 08:09

## 2021-01-01 RX ADMIN — WARFARIN SODIUM 6 MG: 3 TABLET ORAL at 05:08

## 2021-01-01 RX ADMIN — BUMETANIDE 1 MG: 1 TABLET ORAL at 10:03

## 2021-01-01 RX ADMIN — HYDRALAZINE HYDROCHLORIDE 100 MG: 50 TABLET ORAL at 01:07

## 2021-01-01 RX ADMIN — BUMETANIDE 1 MG: 1 TABLET ORAL at 09:08

## 2021-01-01 RX ADMIN — ASPIRIN 325 MG ORAL TABLET 325 MG: 325 PILL ORAL at 08:04

## 2021-01-01 RX ADMIN — BUMETANIDE 1 MG: 1 TABLET ORAL at 01:10

## 2021-01-01 RX ADMIN — VANCOMYCIN HYDROCHLORIDE 1250 MG: 1.25 INJECTION, POWDER, LYOPHILIZED, FOR SOLUTION INTRAVENOUS at 12:03

## 2021-01-01 RX ADMIN — BUMETANIDE 1 MG: 1 TABLET ORAL at 05:04

## 2021-01-01 RX ADMIN — BUMETANIDE 1 MG: 1 TABLET ORAL at 09:04

## 2021-01-01 RX ADMIN — ETOMIDATE 6 MG: 2 INJECTION, SOLUTION INTRAVENOUS at 02:02

## 2021-01-01 RX ADMIN — OXYCODONE HYDROCHLORIDE 5 MG: 5 TABLET ORAL at 04:02

## 2021-01-01 RX ADMIN — CYCLOBENZAPRINE HYDROCHLORIDE 5 MG: 5 TABLET, FILM COATED ORAL at 12:02

## 2021-01-01 RX ADMIN — VANCOMYCIN HYDROCHLORIDE 1500 MG: 1.5 INJECTION, POWDER, LYOPHILIZED, FOR SOLUTION INTRAVENOUS at 10:08

## 2021-01-01 RX ADMIN — SPIRONOLACTONE 25 MG: 25 TABLET, FILM COATED ORAL at 08:05

## 2021-01-01 RX ADMIN — ASPIRIN 325 MG ORAL TABLET 325 MG: 325 PILL ORAL at 09:03

## 2021-01-01 RX ADMIN — Medication 400 MG: at 04:10

## 2021-01-01 RX ADMIN — CEFTAROLINE FOSAMIL 600 MG: 600 POWDER, FOR SOLUTION INTRAVENOUS at 05:08

## 2021-01-01 RX ADMIN — SPIRONOLACTONE 25 MG: 25 TABLET, FILM COATED ORAL at 09:02

## 2021-01-01 RX ADMIN — Medication 2 G: at 01:08

## 2021-01-01 RX ADMIN — HYDRALAZINE HYDROCHLORIDE 100 MG: 50 TABLET, FILM COATED ORAL at 09:04

## 2021-01-01 RX ADMIN — BUMETANIDE 1 MG: 1 TABLET ORAL at 08:02

## 2021-01-01 RX ADMIN — HYDRALAZINE HYDROCHLORIDE 100 MG: 50 TABLET, FILM COATED ORAL at 09:05

## 2021-01-01 RX ADMIN — MUPIROCIN: 20 OINTMENT TOPICAL at 08:02

## 2021-01-01 RX ADMIN — Medication 2 G: at 11:08

## 2021-01-01 RX ADMIN — HYDRALAZINE HYDROCHLORIDE 100 MG: 50 TABLET, FILM COATED ORAL at 05:03

## 2021-01-01 RX ADMIN — TRAMADOL HYDROCHLORIDE 50 MG: 50 TABLET, COATED ORAL at 08:08

## 2021-01-01 RX ADMIN — SPIRONOLACTONE 25 MG: 25 TABLET, FILM COATED ORAL at 09:08

## 2021-01-01 RX ADMIN — BUMETANIDE 1 MG: 1 TABLET ORAL at 11:05

## 2021-01-01 RX ADMIN — SODIUM CHLORIDE: 234 INJECTION INTRAMUSCULAR; INTRAVENOUS; SUBCUTANEOUS at 01:08

## 2021-01-01 RX ADMIN — HYDRALAZINE HYDROCHLORIDE 100 MG: 50 TABLET ORAL at 05:08

## 2021-01-01 RX ADMIN — TRAMADOL HYDROCHLORIDE 50 MG: 50 TABLET, COATED ORAL at 08:07

## 2021-01-01 RX ADMIN — HYDRALAZINE HYDROCHLORIDE 100 MG: 50 TABLET, FILM COATED ORAL at 01:05

## 2021-01-01 RX ADMIN — WARFARIN SODIUM 11.25 MG: 10 TABLET ORAL at 05:04

## 2021-01-01 RX ADMIN — VANCOMYCIN HYDROCHLORIDE 1000 MG: 1 INJECTION, POWDER, LYOPHILIZED, FOR SOLUTION INTRAVENOUS at 01:04

## 2021-01-01 RX ADMIN — HYDRALAZINE HYDROCHLORIDE 100 MG: 50 TABLET ORAL at 02:07

## 2021-01-01 RX ADMIN — SODIUM CHLORIDE: 234 INJECTION INTRAMUSCULAR; INTRAVENOUS; SUBCUTANEOUS at 11:07

## 2021-01-01 RX ADMIN — HYDRALAZINE HYDROCHLORIDE 100 MG: 50 TABLET, FILM COATED ORAL at 01:02

## 2021-01-01 RX ADMIN — SPIRONOLACTONE 25 MG: 25 TABLET, FILM COATED ORAL at 09:07

## 2021-01-01 RX ADMIN — VANCOMYCIN HYDROCHLORIDE 1250 MG: 1.25 INJECTION, POWDER, LYOPHILIZED, FOR SOLUTION INTRAVENOUS at 09:03

## 2021-01-01 RX ADMIN — HYDRALAZINE HYDROCHLORIDE 100 MG: 50 TABLET ORAL at 09:08

## 2021-01-01 RX ADMIN — VANCOMYCIN HYDROCHLORIDE 1250 MG: 1.25 INJECTION, POWDER, LYOPHILIZED, FOR SOLUTION INTRAVENOUS at 01:05

## 2021-01-01 RX ADMIN — POLYETHYLENE GLYCOL 3350 17 G: 17 POWDER, FOR SOLUTION ORAL at 09:08

## 2021-01-01 RX ADMIN — TRAMADOL HYDROCHLORIDE 50 MG: 50 TABLET, COATED ORAL at 09:08

## 2021-01-01 RX ADMIN — Medication 10 ML: at 06:02

## 2021-01-01 RX ADMIN — VANCOMYCIN HYDROCHLORIDE 1500 MG: 1.5 INJECTION, POWDER, LYOPHILIZED, FOR SOLUTION INTRAVENOUS at 11:08

## 2021-01-01 RX ADMIN — CEFTAROLINE FOSAMIL 600 MG: 600 POWDER, FOR SOLUTION INTRAVENOUS at 03:08

## 2021-01-01 RX ADMIN — HYDRALAZINE HYDROCHLORIDE 100 MG: 50 TABLET, FILM COATED ORAL at 05:02

## 2021-01-01 RX ADMIN — DIPHENHYDRAMINE HYDROCHLORIDE 25 MG: 50 INJECTION, SOLUTION INTRAMUSCULAR; INTRAVENOUS at 04:03

## 2021-01-01 RX ADMIN — HYDRALAZINE HYDROCHLORIDE 100 MG: 50 TABLET ORAL at 10:08

## 2021-01-01 RX ADMIN — Medication 400 MG: at 02:10

## 2021-01-01 RX ADMIN — DAPTOMYCIN 900 MG: 350 INJECTION, POWDER, LYOPHILIZED, FOR SOLUTION INTRAVENOUS at 01:08

## 2021-01-01 RX ADMIN — DAPTOMYCIN 1050 MG: 350 INJECTION, POWDER, LYOPHILIZED, FOR SOLUTION INTRAVENOUS at 09:10

## 2021-01-01 RX ADMIN — SPIRONOLACTONE 25 MG: 25 TABLET, FILM COATED ORAL at 09:03

## 2021-01-01 RX ADMIN — BUMETANIDE 1 MG: 1 TABLET ORAL at 09:02

## 2021-01-01 RX ADMIN — SODIUM CHLORIDE: 234 INJECTION INTRAMUSCULAR; INTRAVENOUS; SUBCUTANEOUS at 12:08

## 2021-01-01 RX ADMIN — HYDRALAZINE HYDROCHLORIDE 100 MG: 50 TABLET, FILM COATED ORAL at 10:04

## 2021-01-01 RX ADMIN — DRONABINOL 2.5 MG: 2.5 CAPSULE ORAL at 08:08

## 2021-01-01 RX ADMIN — VANCOMYCIN HYDROCHLORIDE 1500 MG: 1.5 INJECTION, POWDER, LYOPHILIZED, FOR SOLUTION INTRAVENOUS at 11:07

## 2021-01-01 RX ADMIN — CEFTAROLINE FOSAMIL 600 MG: 600 POWDER, FOR SOLUTION INTRAVENOUS at 07:08

## 2021-01-01 RX ADMIN — OXYCODONE HYDROCHLORIDE 2.5 MG: 5 SOLUTION ORAL at 03:08

## 2021-01-01 RX ADMIN — SPIRONOLACTONE 25 MG: 25 TABLET, FILM COATED ORAL at 08:03

## 2021-01-01 RX ADMIN — DAPTOMYCIN 700 MG: 350 INJECTION, POWDER, LYOPHILIZED, FOR SOLUTION INTRAVENOUS at 06:02

## 2021-01-01 RX ADMIN — PHENYLEPHRINE HYDROCHLORIDE 100 MCG: 10 INJECTION, SOLUTION INTRAMUSCULAR; INTRAVENOUS; SUBCUTANEOUS at 03:05

## 2021-01-01 RX ADMIN — BUMETANIDE 1 MG: 1 TABLET ORAL at 04:03

## 2021-01-01 RX ADMIN — MELATONIN TAB 3 MG 6 MG: 3 TAB at 08:02

## 2021-01-01 RX ADMIN — WARFARIN SODIUM 9 MG: 3 TABLET ORAL at 06:03

## 2021-01-01 RX ADMIN — DOCUSATE SODIUM 50 MG: 50 CAPSULE, LIQUID FILLED ORAL at 09:07

## 2021-01-01 RX ADMIN — POTASSIUM CHLORIDE 30 MEQ: 10 CAPSULE, COATED, EXTENDED RELEASE ORAL at 11:10

## 2021-01-01 RX ADMIN — HYDRALAZINE HYDROCHLORIDE 100 MG: 50 TABLET ORAL at 10:10

## 2021-01-01 RX ADMIN — SODIUM CHLORIDE: 234 INJECTION INTRAMUSCULAR; INTRAVENOUS; SUBCUTANEOUS at 04:07

## 2021-01-01 RX ADMIN — ACETAMINOPHEN 650 MG: 325 TABLET ORAL at 09:09

## 2021-01-01 RX ADMIN — CEFTAROLINE FOSAMIL 600 MG: 600 POWDER, FOR SOLUTION INTRAVENOUS at 04:08

## 2021-01-01 RX ADMIN — CYCLOBENZAPRINE HYDROCHLORIDE 5 MG: 5 TABLET, FILM COATED ORAL at 03:02

## 2021-01-01 RX ADMIN — LISINOPRIL 5 MG: 2.5 TABLET ORAL at 08:08

## 2021-01-01 RX ADMIN — Medication 1 CAPSULE: at 02:10

## 2021-01-01 RX ADMIN — BUMETANIDE 1 MG: 1 TABLET ORAL at 08:09

## 2021-01-01 RX ADMIN — DOXYCYCLINE HYCLATE 100 MG: 100 TABLET, COATED ORAL at 01:10

## 2021-01-01 RX ADMIN — HEPARIN SODIUM AND DEXTROSE 17 UNITS/KG/HR: 10000; 5 INJECTION INTRAVENOUS at 01:08

## 2021-01-01 RX ADMIN — BUMETANIDE 1 MG: 1 TABLET ORAL at 01:08

## 2021-01-01 RX ADMIN — Medication 800 MG: at 10:03

## 2021-01-01 RX ADMIN — ATORVASTATIN CALCIUM 20 MG: 20 TABLET, FILM COATED ORAL at 09:08

## 2021-01-01 RX ADMIN — LISINOPRIL 5 MG: 5 TABLET ORAL at 09:05

## 2021-01-01 RX ADMIN — DOXYCYCLINE HYCLATE 100 MG: 100 TABLET, COATED ORAL at 08:10

## 2021-01-01 RX ADMIN — Medication 10 ML: at 12:02

## 2021-01-01 RX ADMIN — ETOMIDATE 20 MG: 2 INJECTION, SOLUTION INTRAVENOUS at 10:08

## 2021-01-01 RX ADMIN — HEPARIN SODIUM AND DEXTROSE 20 UNITS/KG/HR: 10000; 5 INJECTION INTRAVENOUS at 07:08

## 2021-01-01 RX ADMIN — PHENYLEPHRINE HYDROCHLORIDE 100 MCG: 10 INJECTION, SOLUTION INTRAMUSCULAR; INTRAVENOUS; SUBCUTANEOUS at 11:08

## 2021-01-01 RX ADMIN — HYDRALAZINE HYDROCHLORIDE 100 MG: 50 TABLET, FILM COATED ORAL at 05:04

## 2021-01-01 RX ADMIN — WARFARIN SODIUM 7.5 MG: 7.5 TABLET ORAL at 04:03

## 2021-01-01 RX ADMIN — CEFTAROLINE FOSAMIL 600 MG: 600 POWDER, FOR SOLUTION INTRAVENOUS at 01:08

## 2021-01-01 RX ADMIN — HYDRALAZINE HYDROCHLORIDE 100 MG: 50 TABLET, FILM COATED ORAL at 05:05

## 2021-01-01 RX ADMIN — CEFTAROLINE FOSAMIL 600 MG: 600 POWDER, FOR SOLUTION INTRAVENOUS at 10:08

## 2021-01-01 RX ADMIN — MEROPENEM 2 G: 1 INJECTION INTRAVENOUS at 06:10

## 2021-01-01 RX ADMIN — OXYCODONE HYDROCHLORIDE 5 MG: 5 TABLET ORAL at 12:02

## 2021-01-01 RX ADMIN — LISINOPRIL 5 MG: 5 TABLET ORAL at 08:10

## 2021-01-01 RX ADMIN — ASPIRIN 325 MG ORAL TABLET 325 MG: 325 PILL ORAL at 08:02

## 2021-01-01 RX ADMIN — HYDRALAZINE HYDROCHLORIDE 100 MG: 50 TABLET, FILM COATED ORAL at 06:02

## 2021-01-01 RX ADMIN — CYCLOBENZAPRINE HYDROCHLORIDE 5 MG: 5 TABLET, FILM COATED ORAL at 08:02

## 2021-01-01 RX ADMIN — SPIRONOLACTONE 25 MG: 25 TABLET, FILM COATED ORAL at 08:08

## 2021-01-01 RX ADMIN — POTASSIUM CHLORIDE 20 MEQ: 1500 TABLET, EXTENDED RELEASE ORAL at 08:03

## 2021-01-01 RX ADMIN — DIPHENHYDRAMINE HYDROCHLORIDE 25 MG: 25 CAPSULE ORAL at 03:08

## 2021-01-01 RX ADMIN — OXYCODONE HYDROCHLORIDE 5 MG: 5 TABLET ORAL at 10:02

## 2021-01-01 RX ADMIN — LIDOCAINE HYDROCHLORIDE 15 ML: 20 SOLUTION ORAL; TOPICAL at 03:05

## 2021-01-01 RX ADMIN — HEPARIN SODIUM AND DEXTROSE 14 UNITS/KG/HR: 10000; 5 INJECTION INTRAVENOUS at 11:03

## 2021-01-01 RX ADMIN — LISINOPRIL 5 MG: 5 TABLET ORAL at 08:05

## 2021-01-01 RX ADMIN — CEFEPIME 1 G: 1 INJECTION, POWDER, FOR SOLUTION INTRAMUSCULAR; INTRAVENOUS at 09:07

## 2021-01-01 RX ADMIN — DIPHENHYDRAMINE HYDROCHLORIDE 25 MG: 25 CAPSULE ORAL at 10:04

## 2021-01-01 RX ADMIN — MELATONIN TAB 3 MG 9 MG: 3 TAB at 09:08

## 2021-01-01 RX ADMIN — HYDRALAZINE HYDROCHLORIDE 100 MG: 50 TABLET ORAL at 04:09

## 2021-01-01 RX ADMIN — HYDRALAZINE HYDROCHLORIDE 100 MG: 50 TABLET ORAL at 09:09

## 2021-01-01 RX ADMIN — MUPIROCIN: 20 OINTMENT TOPICAL at 09:03

## 2021-01-01 RX ADMIN — HYDRALAZINE HYDROCHLORIDE 100 MG: 50 TABLET ORAL at 01:08

## 2021-01-01 RX ADMIN — SPIRONOLACTONE 25 MG: 25 TABLET, FILM COATED ORAL at 08:07

## 2021-01-01 RX ADMIN — VANCOMYCIN HYDROCHLORIDE 1500 MG: 1.5 INJECTION, POWDER, LYOPHILIZED, FOR SOLUTION INTRAVENOUS at 12:02

## 2021-01-01 RX ADMIN — ASPIRIN 325 MG ORAL TABLET 325 MG: 325 PILL ORAL at 08:05

## 2021-01-01 RX ADMIN — SODIUM ZIRCONIUM CYCLOSILICATE 10 G: 10 POWDER, FOR SUSPENSION ORAL at 10:02

## 2021-01-01 RX ADMIN — DRONABINOL 2.5 MG: 2.5 CAPSULE ORAL at 09:08

## 2021-01-01 RX ADMIN — VANCOMYCIN HYDROCHLORIDE 1250 MG: 1.25 INJECTION, POWDER, LYOPHILIZED, FOR SOLUTION INTRAVENOUS at 02:05

## 2021-01-01 RX ADMIN — ATORVASTATIN CALCIUM 20 MG: 20 TABLET, FILM COATED ORAL at 08:08

## 2021-01-01 RX ADMIN — SODIUM CHLORIDE 0.5 MCG/KG/HR: 9 INJECTION, SOLUTION INTRAMUSCULAR; INTRAVENOUS; SUBCUTANEOUS at 02:02

## 2021-01-01 RX ADMIN — Medication 2 G: at 05:10

## 2021-01-01 RX ADMIN — DEXMEDETOMIDINE HYDROCHLORIDE 47.8 MCG: 100 INJECTION, SOLUTION, CONCENTRATE INTRAVENOUS at 02:02

## 2021-01-01 RX ADMIN — FENTANYL CITRATE 50 MCG: 50 INJECTION, SOLUTION INTRAMUSCULAR; INTRAVENOUS at 05:07

## 2021-01-01 RX ADMIN — RIFAMPIN 300 MG: 300 CAPSULE ORAL at 12:09

## 2021-01-01 RX ADMIN — ROCURONIUM BROMIDE 50 MG: 10 INJECTION, SOLUTION INTRAVENOUS at 10:08

## 2021-01-01 RX ADMIN — LISINOPRIL 5 MG: 2.5 TABLET ORAL at 09:08

## 2021-01-01 RX ADMIN — Medication 800 MG: at 09:03

## 2021-01-01 RX ADMIN — OXYCODONE 5 MG: 5 TABLET ORAL at 12:02

## 2021-01-01 RX ADMIN — LISINOPRIL 5 MG: 5 TABLET ORAL at 09:03

## 2021-01-01 RX ADMIN — SPIRONOLACTONE 25 MG: 25 TABLET, FILM COATED ORAL at 08:02

## 2021-01-01 RX ADMIN — HEPARIN SODIUM AND DEXTROSE 20 UNITS/KG/HR: 10000; 5 INJECTION INTRAVENOUS at 01:08

## 2021-01-01 RX ADMIN — OXYCODONE HYDROCHLORIDE 5 MG: 5 TABLET ORAL at 05:02

## 2021-01-01 RX ADMIN — ATORVASTATIN CALCIUM 20 MG: 20 TABLET, FILM COATED ORAL at 09:07

## 2021-01-01 RX ADMIN — PIPERACILLIN SODIUM AND TAZOBACTAM SODIUM 4.5 G: 4; .5 INJECTION, POWDER, FOR SOLUTION INTRAVENOUS at 02:10

## 2021-01-01 RX ADMIN — DIPHENHYDRAMINE HYDROCHLORIDE 50 MG: 50 INJECTION, SOLUTION INTRAMUSCULAR; INTRAVENOUS at 01:07

## 2021-01-01 RX ADMIN — LISINOPRIL 5 MG: 2.5 TABLET ORAL at 09:07

## 2021-01-01 RX ADMIN — POTASSIUM CHLORIDE 40 MEQ: 1500 TABLET, EXTENDED RELEASE ORAL at 10:07

## 2021-01-01 RX ADMIN — HYDRALAZINE HYDROCHLORIDE 100 MG: 50 TABLET, FILM COATED ORAL at 08:02

## 2021-01-01 RX ADMIN — HYDRALAZINE HYDROCHLORIDE 100 MG: 50 TABLET, FILM COATED ORAL at 09:02

## 2021-01-01 RX ADMIN — CEFTAROLINE FOSAMIL 600 MG: 600 POWDER, FOR SOLUTION INTRAVENOUS at 04:10

## 2021-01-01 RX ADMIN — ZOLPIDEM TARTRATE 5 MG: 5 TABLET ORAL at 09:03

## 2021-01-01 RX ADMIN — FLUDROCORTISONE ACETATE 100 MCG: 0.1 TABLET ORAL at 01:08

## 2021-01-01 RX ADMIN — MUPIROCIN: 20 OINTMENT TOPICAL at 09:10

## 2021-01-01 RX ADMIN — EPINEPHRINE 10 MCG: 0.1 INJECTION, SOLUTION ENDOTRACHEAL; INTRACARDIAC; INTRAVENOUS at 03:05

## 2021-01-01 RX ADMIN — CEFTAROLINE FOSAMIL 600 MG: 600 POWDER, FOR SOLUTION INTRAVENOUS at 02:08

## 2021-01-01 RX ADMIN — WARFARIN SODIUM 7.5 MG: 7.5 TABLET ORAL at 05:02

## 2021-01-01 RX ADMIN — HEPARIN SODIUM AND DEXTROSE 19 UNITS/KG/HR: 10000; 5 INJECTION INTRAVENOUS at 05:08

## 2021-01-01 RX ADMIN — Medication 400 MG: at 08:10

## 2021-01-01 RX ADMIN — ONDANSETRON 8 MG: 2 INJECTION INTRAMUSCULAR; INTRAVENOUS at 08:07

## 2021-01-01 RX ADMIN — Medication 2 G: at 02:08

## 2021-01-01 RX ADMIN — Medication 10 ML: at 11:02

## 2021-01-01 RX ADMIN — DEXTROSE 1 G: 50 INJECTION, SOLUTION INTRAVENOUS at 04:03

## 2021-01-01 RX ADMIN — TRAMADOL HYDROCHLORIDE 50 MG: 50 TABLET, COATED ORAL at 02:08

## 2021-01-01 RX ADMIN — MANNITOL 37.5 G: 12.5 INJECTION, SOLUTION INTRAVENOUS at 05:07

## 2021-01-01 RX ADMIN — MUPIROCIN: 20 OINTMENT TOPICAL at 08:07

## 2021-01-01 RX ADMIN — LISINOPRIL 5 MG: 5 TABLET ORAL at 08:02

## 2021-01-01 RX ADMIN — BUMETANIDE 1 MG: 1 TABLET ORAL at 09:03

## 2021-01-01 RX ADMIN — MUPIROCIN: 20 OINTMENT TOPICAL at 09:07

## 2021-01-01 RX ADMIN — MEROPENEM 2 G: 1 INJECTION INTRAVENOUS at 09:10

## 2021-01-01 RX ADMIN — BUMETANIDE 1 MG: 1 TABLET ORAL at 09:05

## 2021-01-01 RX ADMIN — Medication 10 ML: at 05:02

## 2021-01-01 RX ADMIN — ACETAMINOPHEN 650 MG: 325 TABLET ORAL at 09:07

## 2021-01-01 RX ADMIN — CYCLOBENZAPRINE HYDROCHLORIDE 5 MG: 5 TABLET, FILM COATED ORAL at 09:02

## 2021-01-01 RX ADMIN — AMLODIPINE BESYLATE 10 MG: 10 TABLET ORAL at 10:08

## 2021-01-01 RX ADMIN — Medication 2 G: at 07:09

## 2021-01-01 RX ADMIN — Medication 2 G: at 05:09

## 2021-01-01 RX ADMIN — SODIUM ZIRCONIUM CYCLOSILICATE 10 G: 10 POWDER, FOR SUSPENSION ORAL at 09:02

## 2021-01-01 RX ADMIN — ATORVASTATIN CALCIUM 20 MG: 20 TABLET, FILM COATED ORAL at 11:02

## 2021-01-01 RX ADMIN — HYDRALAZINE HYDROCHLORIDE 100 MG: 50 TABLET ORAL at 02:10

## 2021-01-01 RX ADMIN — MEROPENEM 2 G: 1 INJECTION INTRAVENOUS at 05:10

## 2021-01-01 RX ADMIN — SODIUM CHLORIDE: 234 INJECTION INTRAMUSCULAR; INTRAVENOUS; SUBCUTANEOUS at 05:07

## 2021-01-01 RX ADMIN — MUPIROCIN: 20 OINTMENT TOPICAL at 10:03

## 2021-01-01 RX ADMIN — CYCLOBENZAPRINE HYDROCHLORIDE 5 MG: 5 TABLET, FILM COATED ORAL at 01:02

## 2021-01-01 RX ADMIN — Medication 400 MG: at 01:08

## 2021-01-01 RX ADMIN — WARFARIN SODIUM 7.5 MG: 7.5 TABLET ORAL at 05:04

## 2021-01-01 RX ADMIN — SODIUM CHLORIDE: 234 INJECTION INTRAMUSCULAR; INTRAVENOUS; SUBCUTANEOUS at 07:07

## 2021-01-01 RX ADMIN — SODIUM CHLORIDE: 234 INJECTION INTRAMUSCULAR; INTRAVENOUS; SUBCUTANEOUS at 03:07

## 2021-01-01 RX ADMIN — ACETAMINOPHEN 650 MG: 325 TABLET ORAL at 12:07

## 2021-01-01 RX ADMIN — WARFARIN SODIUM 5 MG: 5 TABLET ORAL at 04:08

## 2021-01-01 RX ADMIN — MIDAZOLAM 2 MG: 1 INJECTION INTRAMUSCULAR; INTRAVENOUS at 10:08

## 2021-01-01 RX ADMIN — VANCOMYCIN HYDROCHLORIDE 1500 MG: 1.5 INJECTION, POWDER, LYOPHILIZED, FOR SOLUTION INTRAVENOUS at 01:07

## 2021-01-01 RX ADMIN — CYCLOBENZAPRINE HYDROCHLORIDE 5 MG: 5 TABLET, FILM COATED ORAL at 05:02

## 2021-01-01 RX ADMIN — ACETAMINOPHEN 650 MG: 325 TABLET ORAL at 06:07

## 2021-01-01 RX ADMIN — MUPIROCIN: 20 OINTMENT TOPICAL at 09:04

## 2021-01-01 RX ADMIN — MUPIROCIN: 20 OINTMENT TOPICAL at 09:02

## 2021-01-01 RX ADMIN — Medication 10 ML: at 06:10

## 2021-01-01 RX ADMIN — AMLODIPINE BESYLATE 10 MG: 10 TABLET ORAL at 08:03

## 2021-01-01 RX ADMIN — PREDNISONE 50 MG: 20 TABLET ORAL at 03:08

## 2021-01-01 RX ADMIN — ACETAMINOPHEN 650 MG: 325 TABLET ORAL at 09:08

## 2021-01-01 RX ADMIN — LEVETIRACETAM 500 MG: 5 INJECTION INTRAVENOUS at 10:07

## 2021-01-01 RX ADMIN — OXYCODONE 5 MG: 5 TABLET ORAL at 04:02

## 2021-01-01 RX ADMIN — BUMETANIDE 1 MG: 1 TABLET ORAL at 08:10

## 2021-01-01 RX ADMIN — DAPTOMYCIN 900 MG: 350 INJECTION, POWDER, LYOPHILIZED, FOR SOLUTION INTRAVENOUS at 02:08

## 2021-01-01 RX ADMIN — ETOMIDATE 4 MG: 2 INJECTION, SOLUTION INTRAVENOUS at 03:05

## 2021-01-01 RX ADMIN — LISINOPRIL 5 MG: 5 TABLET ORAL at 09:10

## 2021-01-01 RX ADMIN — POTASSIUM CHLORIDE 20 MEQ: 1500 TABLET, EXTENDED RELEASE ORAL at 09:04

## 2021-01-01 RX ADMIN — HYDRALAZINE HYDROCHLORIDE 100 MG: 50 TABLET ORAL at 10:07

## 2021-01-01 RX ADMIN — MEROPENEM 2 G: 1 INJECTION INTRAVENOUS at 10:10

## 2021-01-01 RX ADMIN — ASPIRIN 325 MG ORAL TABLET 325 MG: 325 PILL ORAL at 09:02

## 2021-01-01 RX ADMIN — BUMETANIDE 1 MG: 1 TABLET ORAL at 05:03

## 2021-01-01 RX ADMIN — Medication 2 G: at 05:08

## 2021-01-01 RX ADMIN — POTASSIUM CHLORIDE 30 MEQ: 10 CAPSULE, COATED, EXTENDED RELEASE ORAL at 08:10

## 2021-01-01 RX ADMIN — Medication 2 G: at 09:10

## 2021-01-01 RX ADMIN — HUMAN ALBUMIN MICROSPHERES AND PERFLUTREN 0.66 MG: 10; .22 INJECTION, SOLUTION INTRAVENOUS at 02:04

## 2021-01-01 RX ADMIN — TRAMADOL HYDROCHLORIDE 50 MG: 50 TABLET, COATED ORAL at 10:07

## 2021-01-01 RX ADMIN — MELATONIN TAB 3 MG 6 MG: 3 TAB at 08:05

## 2021-01-01 RX ADMIN — SODIUM ZIRCONIUM CYCLOSILICATE 10 G: 10 POWDER, FOR SUSPENSION ORAL at 11:02

## 2021-01-01 RX ADMIN — ACETAMINOPHEN 650 MG: 325 TABLET ORAL at 04:07

## 2021-01-01 RX ADMIN — DRONABINOL 2.5 MG: 2.5 CAPSULE ORAL at 10:08

## 2021-01-01 RX ADMIN — HYDRALAZINE HYDROCHLORIDE 100 MG: 50 TABLET ORAL at 06:07

## 2021-01-01 RX ADMIN — PIPERACILLIN SODIUM AND TAZOBACTAM SODIUM 4.5 G: 4; .5 INJECTION, POWDER, FOR SOLUTION INTRAVENOUS at 09:10

## 2021-01-01 RX ADMIN — VANCOMYCIN HYDROCHLORIDE 1500 MG: 1.5 INJECTION, POWDER, LYOPHILIZED, FOR SOLUTION INTRAVENOUS at 01:02

## 2021-01-01 RX ADMIN — ETOMIDATE 4 MG: 2 INJECTION INTRAVENOUS at 11:08

## 2021-01-01 RX ADMIN — VANCOMYCIN HYDROCHLORIDE 2500 MG: 1 INJECTION, POWDER, LYOPHILIZED, FOR SOLUTION INTRAVENOUS at 01:04

## 2021-01-01 RX ADMIN — POTASSIUM CHLORIDE 30 MEQ: 10 CAPSULE, COATED, EXTENDED RELEASE ORAL at 10:10

## 2021-01-01 RX ADMIN — WARFARIN SODIUM 7.5 MG: 7.5 TABLET ORAL at 04:10

## 2021-01-01 RX ADMIN — POTASSIUM CHLORIDE 20 MEQ: 1500 TABLET, EXTENDED RELEASE ORAL at 08:02

## 2021-01-01 RX ADMIN — HYDRALAZINE HYDROCHLORIDE 100 MG: 50 TABLET, FILM COATED ORAL at 02:04

## 2021-01-01 RX ADMIN — PREDNISONE 50 MG: 50 TABLET ORAL at 07:08

## 2021-01-01 RX ADMIN — BUMETANIDE 1 MG: 1 TABLET ORAL at 09:07

## 2021-01-01 RX ADMIN — VANCOMYCIN HYDROCHLORIDE 1000 MG: 1 INJECTION, POWDER, LYOPHILIZED, FOR SOLUTION INTRAVENOUS at 02:04

## 2021-01-01 RX ADMIN — ATORVASTATIN CALCIUM 20 MG: 20 TABLET, FILM COATED ORAL at 08:04

## 2021-01-01 RX ADMIN — HYDRALAZINE HYDROCHLORIDE 100 MG: 50 TABLET, FILM COATED ORAL at 06:03

## 2021-01-01 RX ADMIN — PHYTONADIONE 10 MG: 10 INJECTION, EMULSION INTRAMUSCULAR; INTRAVENOUS; SUBCUTANEOUS at 10:07

## 2021-01-01 RX ADMIN — OXYCODONE 5 MG: 5 TABLET ORAL at 01:02

## 2021-01-01 RX ADMIN — HYDRALAZINE HYDROCHLORIDE 100 MG: 50 TABLET ORAL at 06:08

## 2021-01-01 RX ADMIN — OXYCODONE HYDROCHLORIDE 5 MG: 5 TABLET ORAL at 07:02

## 2021-01-01 RX ADMIN — Medication 800 MG: at 08:03

## 2021-01-01 RX ADMIN — BUMETANIDE 1 MG: 1 TABLET ORAL at 09:10

## 2021-01-01 RX ADMIN — BENZONATATE 100 MG: 100 CAPSULE ORAL at 03:03

## 2021-01-01 RX ADMIN — VANCOMYCIN HYDROCHLORIDE 1000 MG: 1 INJECTION, POWDER, LYOPHILIZED, FOR SOLUTION INTRAVENOUS at 10:03

## 2021-01-01 RX ADMIN — ACETAMINOPHEN 650 MG: 325 TABLET ORAL at 01:04

## 2021-01-01 RX ADMIN — AMLODIPINE BESYLATE 10 MG: 10 TABLET ORAL at 04:09

## 2021-01-01 RX ADMIN — HYDRALAZINE HYDROCHLORIDE 100 MG: 50 TABLET ORAL at 03:07

## 2021-01-01 RX ADMIN — IODIXANOL 80 ML: 320 INJECTION, SOLUTION INTRAVASCULAR at 06:07

## 2021-01-01 RX ADMIN — ACETAMINOPHEN 650 MG: 325 TABLET ORAL at 05:08

## 2021-01-01 RX ADMIN — DAPTOMYCIN 1120 MG: 350 INJECTION, POWDER, LYOPHILIZED, FOR SOLUTION INTRAVENOUS at 12:09

## 2021-01-01 RX ADMIN — BUMETANIDE 1 MG: 1 TABLET ORAL at 08:04

## 2021-01-01 RX ADMIN — POTASSIUM CHLORIDE 20 MEQ: 1500 TABLET, EXTENDED RELEASE ORAL at 09:07

## 2021-01-01 RX ADMIN — HYDRALAZINE HYDROCHLORIDE 100 MG: 50 TABLET, FILM COATED ORAL at 04:03

## 2021-01-01 RX ADMIN — Medication 2 G: at 08:08

## 2021-01-01 RX ADMIN — Medication 2 G: at 02:10

## 2021-01-01 RX ADMIN — VANCOMYCIN HYDROCHLORIDE 1000 MG: 1 INJECTION, POWDER, LYOPHILIZED, FOR SOLUTION INTRAVENOUS at 05:05

## 2021-01-01 RX ADMIN — BUMETANIDE 1 MG: 1 TABLET ORAL at 09:09

## 2021-01-01 RX ADMIN — ATORVASTATIN CALCIUM 20 MG: 20 TABLET, FILM COATED ORAL at 09:05

## 2021-01-01 RX ADMIN — Medication 2 G: at 03:08

## 2021-01-01 RX ADMIN — AMLODIPINE BESYLATE 10 MG: 10 TABLET ORAL at 08:08

## 2021-01-01 RX ADMIN — POTASSIUM CHLORIDE 30 MEQ: 10 CAPSULE, COATED, EXTENDED RELEASE ORAL at 09:10

## 2021-01-01 RX ADMIN — HYDRALAZINE HYDROCHLORIDE 100 MG: 50 TABLET ORAL at 05:07

## 2021-01-01 RX ADMIN — HEPARIN SODIUM AND DEXTROSE 8 UNITS/KG/HR: 10000; 5 INJECTION INTRAVENOUS at 01:07

## 2021-01-01 RX ADMIN — AMLODIPINE BESYLATE 10 MG: 10 TABLET ORAL at 08:05

## 2021-01-01 RX ADMIN — DIPHENHYDRAMINE HYDROCHLORIDE 25 MG: 25 CAPSULE ORAL at 11:04

## 2021-01-01 RX ADMIN — MUPIROCIN: 20 OINTMENT TOPICAL at 09:09

## 2021-01-01 RX ADMIN — SODIUM CHLORIDE: 0.9 INJECTION, SOLUTION INTRAVENOUS at 02:07

## 2021-01-01 RX ADMIN — SODIUM CHLORIDE 0.2 MCG/KG/MIN: 9 INJECTION, SOLUTION INTRAVENOUS at 11:08

## 2021-01-01 RX ADMIN — BUMETANIDE 1 MG: 1 TABLET ORAL at 08:05

## 2021-01-01 RX ADMIN — Medication 400 MG: at 02:08

## 2021-01-01 RX ADMIN — ASPIRIN 325 MG ORAL TABLET 325 MG: 325 PILL ORAL at 09:05

## 2021-01-01 RX ADMIN — HEPARIN SODIUM AND DEXTROSE 20 UNITS/KG/HR: 10000; 5 INJECTION INTRAVENOUS at 11:08

## 2021-01-01 RX ADMIN — BUMETANIDE 1 MG: 1 TABLET ORAL at 08:08

## 2021-01-01 RX ADMIN — SODIUM CHLORIDE: 0.9 INJECTION, SOLUTION INTRAVENOUS at 08:07

## 2021-01-01 RX ADMIN — LISINOPRIL 5 MG: 5 TABLET ORAL at 11:02

## 2021-01-01 RX ADMIN — VANCOMYCIN HYDROCHLORIDE 2250 MG: 1.25 INJECTION, POWDER, LYOPHILIZED, FOR SOLUTION INTRAVENOUS at 06:07

## 2021-01-01 RX ADMIN — LEVETIRACETAM 500 MG: 5 INJECTION INTRAVENOUS at 09:07

## 2021-01-01 RX ADMIN — Medication 10 ML: at 08:02

## 2021-01-01 RX ADMIN — HEPARIN SODIUM AND DEXTROSE 20 UNITS/KG/HR: 10000; 5 INJECTION INTRAVENOUS at 02:08

## 2021-01-01 RX ADMIN — Medication 1 CAPSULE: at 09:10

## 2021-01-01 RX ADMIN — WARFARIN SODIUM 7.5 MG: 7.5 TABLET ORAL at 04:02

## 2021-01-01 RX ADMIN — CEFTAROLINE FOSAMIL 600 MG: 600 POWDER, FOR SOLUTION INTRAVENOUS at 12:10

## 2021-01-01 RX ADMIN — VANCOMYCIN HYDROCHLORIDE 1250 MG: 1.25 INJECTION, POWDER, LYOPHILIZED, FOR SOLUTION INTRAVENOUS at 08:02

## 2021-01-01 RX ADMIN — ZOLPIDEM TARTRATE 5 MG: 5 TABLET ORAL at 12:03

## 2021-01-01 RX ADMIN — MUPIROCIN: 20 OINTMENT TOPICAL at 10:07

## 2021-01-01 RX ADMIN — LISINOPRIL 5 MG: 5 TABLET ORAL at 08:04

## 2021-01-01 RX ADMIN — Medication 10 ML: at 08:03

## 2021-01-01 RX ADMIN — Medication 1 CAPSULE: at 08:10

## 2021-01-01 RX ADMIN — SPIRONOLACTONE 25 MG: 25 TABLET, FILM COATED ORAL at 09:05

## 2021-01-01 RX ADMIN — CYCLOBENZAPRINE HYDROCHLORIDE 5 MG: 5 TABLET, FILM COATED ORAL at 10:02

## 2021-01-01 RX ADMIN — PIPERACILLIN SODIUM AND TAZOBACTAM SODIUM 4.5 G: 4; .5 INJECTION, POWDER, FOR SOLUTION INTRAVENOUS at 08:10

## 2021-01-01 RX ADMIN — DIPHENHYDRAMINE HYDROCHLORIDE 25 MG: 25 CAPSULE ORAL at 09:05

## 2021-01-01 RX ADMIN — TRAMADOL HYDROCHLORIDE 50 MG: 50 TABLET, COATED ORAL at 06:08

## 2021-01-01 RX ADMIN — POTASSIUM CHLORIDE 20 MEQ: 1500 TABLET, EXTENDED RELEASE ORAL at 09:03

## 2021-01-01 RX ADMIN — MIDAZOLAM 2 MG: 1 INJECTION INTRAMUSCULAR; INTRAVENOUS at 03:05

## 2021-01-01 RX ADMIN — ACETAMINOPHEN 650 MG: 325 TABLET ORAL at 11:08

## 2021-01-01 RX ADMIN — Medication 10 ML: at 02:10

## 2021-01-01 RX ADMIN — WARFARIN SODIUM 4 MG: 4 TABLET ORAL at 04:08

## 2021-01-01 RX ADMIN — LIDOCAINE HYDROCHLORIDE 100 MG: 20 INJECTION, SOLUTION INTRAVENOUS at 10:08

## 2021-01-01 RX ADMIN — Medication 400 MG: at 01:10

## 2021-01-01 RX ADMIN — CEFEPIME 1 G: 1 INJECTION, POWDER, FOR SOLUTION INTRAMUSCULAR; INTRAVENOUS at 02:07

## 2021-01-01 RX ADMIN — Medication 2 G: at 02:09

## 2021-01-01 RX ADMIN — OXYCODONE HYDROCHLORIDE 5 MG: 5 TABLET ORAL at 08:02

## 2021-01-01 RX ADMIN — CYCLOBENZAPRINE HYDROCHLORIDE 5 MG: 5 TABLET, FILM COATED ORAL at 02:02

## 2021-01-01 RX ADMIN — VANCOMYCIN HYDROCHLORIDE 1000 MG: 1 INJECTION, POWDER, LYOPHILIZED, FOR SOLUTION INTRAVENOUS at 04:05

## 2021-01-01 RX ADMIN — MUPIROCIN: 20 OINTMENT TOPICAL at 08:03

## 2021-01-01 RX ADMIN — ETOMIDATE 2 MG: 2 INJECTION, SOLUTION INTRAVENOUS at 02:02

## 2021-01-01 RX ADMIN — AMLODIPINE BESYLATE 10 MG: 10 TABLET ORAL at 08:09

## 2021-01-01 RX ADMIN — HYDRALAZINE HYDROCHLORIDE 100 MG: 50 TABLET, FILM COATED ORAL at 01:04

## 2021-01-01 RX ADMIN — LISINOPRIL 5 MG: 2.5 TABLET ORAL at 10:08

## 2021-01-01 RX ADMIN — PREDNISONE 50 MG: 20 TABLET ORAL at 08:08

## 2021-01-01 RX ADMIN — ATORVASTATIN CALCIUM 20 MG: 20 TABLET, FILM COATED ORAL at 10:07

## 2021-01-01 RX ADMIN — HYDRALAZINE HYDROCHLORIDE 100 MG: 50 TABLET, FILM COATED ORAL at 08:04

## 2021-01-01 RX ADMIN — Medication 2 G: at 12:08

## 2021-01-01 RX ADMIN — POTASSIUM CHLORIDE 20 MEQ: 1500 TABLET, EXTENDED RELEASE ORAL at 08:05

## 2021-01-01 RX ADMIN — WARFARIN SODIUM 11.25 MG: 10 TABLET ORAL at 05:02

## 2021-01-01 RX ADMIN — ONDANSETRON 8 MG: 2 INJECTION INTRAMUSCULAR; INTRAVENOUS at 06:07

## 2021-01-01 RX ADMIN — LIDOCAINE HYDROCHLORIDE 15 ML: 20 SOLUTION OROPHARYNGEAL at 02:02

## 2021-01-01 RX ADMIN — AMLODIPINE BESYLATE 10 MG: 10 TABLET ORAL at 09:05

## 2021-01-01 RX ADMIN — SODIUM CHLORIDE: 234 INJECTION INTRAMUSCULAR; INTRAVENOUS; SUBCUTANEOUS at 11:08

## 2021-01-01 RX ADMIN — ACETAMINOPHEN 650 MG: 325 TABLET ORAL at 11:07

## 2021-01-01 RX ADMIN — MEROPENEM 2 G: 1 INJECTION INTRAVENOUS at 02:10

## 2021-01-01 RX ADMIN — ACETAMINOPHEN 650 MG: 325 TABLET ORAL at 02:07

## 2021-01-01 RX ADMIN — CEFAZOLIN SODIUM 1 G: 1 SOLUTION INTRAVENOUS at 08:08

## 2021-01-01 RX ADMIN — WARFARIN SODIUM 6 MG: 3 TABLET ORAL at 04:08

## 2021-01-01 RX ADMIN — AMLODIPINE BESYLATE 10 MG: 10 TABLET ORAL at 08:10

## 2021-01-01 RX ADMIN — BUMETANIDE 1 MG: 1 TABLET ORAL at 08:03

## 2021-01-01 RX ADMIN — LISINOPRIL 5 MG: 2.5 TABLET ORAL at 08:07

## 2021-01-01 RX ADMIN — HYDRALAZINE HYDROCHLORIDE 100 MG: 50 TABLET, FILM COATED ORAL at 07:05

## 2021-01-01 RX ADMIN — ASPIRIN 325 MG ORAL TABLET 325 MG: 325 PILL ORAL at 09:04

## 2021-01-01 RX ADMIN — SODIUM CHLORIDE: 9 INJECTION, SOLUTION INTRAVENOUS at 03:05

## 2021-01-01 RX ADMIN — CYCLOBENZAPRINE HYDROCHLORIDE 5 MG: 5 TABLET, FILM COATED ORAL at 06:02

## 2021-01-01 RX ADMIN — HYDRALAZINE HYDROCHLORIDE 100 MG: 50 TABLET ORAL at 05:10

## 2021-01-01 RX ADMIN — HYDRALAZINE HYDROCHLORIDE 100 MG: 50 TABLET, FILM COATED ORAL at 04:02

## 2021-01-01 RX ADMIN — NEOSTIGMINE METHYLSULFATE 3 MG: 0.5 INJECTION INTRAVENOUS at 12:08

## 2021-01-01 RX ADMIN — MIDAZOLAM 2 MG: 1 INJECTION INTRAMUSCULAR; INTRAVENOUS at 02:02

## 2021-01-01 RX ADMIN — ASPIRIN 325 MG ORAL TABLET 325 MG: 325 PILL ORAL at 08:03

## 2021-01-01 RX ADMIN — VANCOMYCIN HYDROCHLORIDE 1250 MG: 1.25 INJECTION, POWDER, LYOPHILIZED, FOR SOLUTION INTRAVENOUS at 12:02

## 2021-01-01 RX ADMIN — DOXYCYCLINE 100 MG: 50 CAPSULE ORAL at 08:03

## 2021-01-01 RX ADMIN — DIPHENHYDRAMINE HYDROCHLORIDE 25 MG: 25 CAPSULE ORAL at 01:03

## 2021-01-01 RX ADMIN — ALBUTEROL SULFATE 2 PUFF: 108 INHALANT RESPIRATORY (INHALATION) at 09:10

## 2021-01-01 RX ADMIN — DOXYCYCLINE HYCLATE 100 MG: 100 TABLET, COATED ORAL at 10:10

## 2021-01-01 RX ADMIN — TRAMADOL HYDROCHLORIDE 50 MG: 50 TABLET, COATED ORAL at 01:07

## 2021-01-01 RX ADMIN — POTASSIUM CHLORIDE 30 MEQ: 10 CAPSULE, COATED, EXTENDED RELEASE ORAL at 12:10

## 2021-01-01 RX ADMIN — DOXYCYCLINE 100 MG: 50 CAPSULE ORAL at 10:03

## 2021-01-01 RX ADMIN — POTASSIUM CHLORIDE 30 MEQ: 10 CAPSULE, COATED, EXTENDED RELEASE ORAL at 02:10

## 2021-01-01 RX ADMIN — CEFTAROLINE FOSAMIL 600 MG: 600 POWDER, FOR SOLUTION INTRAVENOUS at 05:10

## 2021-01-01 RX ADMIN — POTASSIUM CHLORIDE 20 MEQ: 1500 TABLET, EXTENDED RELEASE ORAL at 08:04

## 2021-01-01 RX ADMIN — EPINEPHRINE 10 MCG: 0.1 INJECTION, SOLUTION ENDOTRACHEAL; INTRACARDIAC; INTRAVENOUS at 02:02

## 2021-01-01 RX ADMIN — HYDRALAZINE HYDROCHLORIDE 100 MG: 50 TABLET ORAL at 08:08

## 2021-01-01 RX ADMIN — PIPERACILLIN SODIUM AND TAZOBACTAM SODIUM 4.5 G: 4; .5 INJECTION, POWDER, FOR SOLUTION INTRAVENOUS at 07:10

## 2021-01-01 RX ADMIN — VANCOMYCIN HYDROCHLORIDE 1500 MG: 1.5 INJECTION, POWDER, LYOPHILIZED, FOR SOLUTION INTRAVENOUS at 06:07

## 2021-01-01 RX ADMIN — HYDRALAZINE HYDROCHLORIDE 100 MG: 50 TABLET, FILM COATED ORAL at 08:05

## 2021-01-01 RX ADMIN — Medication 400 MG: at 06:07

## 2021-01-01 RX ADMIN — SODIUM CHLORIDE 5 ML/HR: 0.9 INJECTION, SOLUTION INTRAVENOUS at 05:07

## 2021-01-01 RX ADMIN — CEFTAROLINE FOSAMIL 600 MG: 600 POWDER, FOR SOLUTION INTRAVENOUS at 08:10

## 2021-01-01 RX ADMIN — POTASSIUM CHLORIDE 20 MEQ: 1500 TABLET, EXTENDED RELEASE ORAL at 09:10

## 2021-01-01 RX ADMIN — MELATONIN TAB 3 MG 6 MG: 3 TAB at 01:05

## 2021-01-01 RX ADMIN — DEXMEDETOMIDINE HYDROCHLORIDE 1 MCG/KG/HR: 100 INJECTION, SOLUTION, CONCENTRATE INTRAVENOUS at 03:05

## 2021-01-01 RX ADMIN — WARFARIN SODIUM 2 MG: 2 TABLET ORAL at 05:03

## 2021-01-01 RX ADMIN — ACETAMINOPHEN 650 MG: 325 TABLET ORAL at 03:08

## 2021-01-01 RX ADMIN — PIPERACILLIN SODIUM AND TAZOBACTAM SODIUM 4.5 G: 4; .5 INJECTION, POWDER, FOR SOLUTION INTRAVENOUS at 04:10

## 2021-01-01 RX ADMIN — ACETAMINOPHEN 650 MG: 325 TABLET ORAL at 06:08

## 2021-01-01 RX ADMIN — WARFARIN SODIUM 7.5 MG: 7.5 TABLET ORAL at 06:05

## 2021-01-01 RX ADMIN — SODIUM CHLORIDE: 234 INJECTION INTRAMUSCULAR; INTRAVENOUS; SUBCUTANEOUS at 04:08

## 2021-01-01 RX ADMIN — LISINOPRIL 5 MG: 5 TABLET ORAL at 09:02

## 2021-01-01 RX ADMIN — Medication 20 MG: at 02:02

## 2021-01-01 RX ADMIN — LIDOCAINE HYDROCHLORIDE 100 MG: 20 INJECTION, SOLUTION INTRAVENOUS at 02:02

## 2021-01-01 RX ADMIN — TRAMADOL HYDROCHLORIDE 50 MG: 50 TABLET, COATED ORAL at 04:07

## 2021-01-01 RX ADMIN — SODIUM ZIRCONIUM CYCLOSILICATE 10 G: 10 POWDER, FOR SUSPENSION ORAL at 03:02

## 2021-01-01 RX ADMIN — HYDRALAZINE HYDROCHLORIDE 100 MG: 50 TABLET, FILM COATED ORAL at 01:03

## 2021-01-01 RX ADMIN — WARFARIN SODIUM 7.5 MG: 7.5 TABLET ORAL at 05:05

## 2021-01-01 RX ADMIN — Medication 400 MG: at 10:10

## 2021-01-01 RX ADMIN — POTASSIUM CHLORIDE 40 MEQ: 1500 TABLET, EXTENDED RELEASE ORAL at 08:03

## 2021-01-01 RX ADMIN — TRAMADOL HYDROCHLORIDE 50 MG: 50 TABLET, COATED ORAL at 03:07

## 2021-01-01 RX ADMIN — SODIUM CHLORIDE: 234 INJECTION INTRAMUSCULAR; INTRAVENOUS; SUBCUTANEOUS at 06:07

## 2021-01-01 RX ADMIN — MUPIROCIN: 20 OINTMENT TOPICAL at 10:10

## 2021-01-01 RX ADMIN — CEFEPIME 1 G: 1 INJECTION, POWDER, FOR SOLUTION INTRAMUSCULAR; INTRAVENOUS at 06:07

## 2021-01-01 RX ADMIN — SODIUM CHLORIDE: 234 INJECTION INTRAMUSCULAR; INTRAVENOUS; SUBCUTANEOUS at 05:08

## 2021-01-01 RX ADMIN — WARFARIN SODIUM 5 MG: 5 TABLET ORAL at 04:10

## 2021-01-01 RX ADMIN — POTASSIUM CHLORIDE 40 MEQ: 1500 TABLET, EXTENDED RELEASE ORAL at 08:09

## 2021-01-01 RX ADMIN — HYDRALAZINE HYDROCHLORIDE 100 MG: 50 TABLET, FILM COATED ORAL at 08:03

## 2021-01-01 RX ADMIN — CYCLOBENZAPRINE HYDROCHLORIDE 5 MG: 5 TABLET, FILM COATED ORAL at 04:02

## 2021-01-01 RX ADMIN — BUMETANIDE 1 MG: 1 TABLET ORAL at 06:05

## 2021-01-01 RX ADMIN — HYDRALAZINE HYDROCHLORIDE 100 MG: 50 TABLET ORAL at 01:10

## 2021-01-01 RX ADMIN — HYDRALAZINE HYDROCHLORIDE 100 MG: 50 TABLET ORAL at 06:10

## 2021-01-01 RX ADMIN — Medication 2 G: at 09:09

## 2021-01-01 RX ADMIN — MELATONIN TAB 3 MG 6 MG: 3 TAB at 10:02

## 2021-01-01 RX ADMIN — HEPARIN SODIUM AND DEXTROSE 12 UNITS/KG/HR: 10000; 5 INJECTION INTRAVENOUS at 09:04

## 2021-01-01 RX ADMIN — DIPHENHYDRAMINE HYDROCHLORIDE 25 MG: 25 CAPSULE ORAL at 06:05

## 2021-01-01 RX ADMIN — BUMETANIDE 1 MG: 1 TABLET ORAL at 06:03

## 2021-01-01 RX ADMIN — WARFARIN SODIUM 4 MG: 4 TABLET ORAL at 05:08

## 2021-01-01 RX ADMIN — VANCOMYCIN HYDROCHLORIDE 1750 MG: 10 INJECTION, POWDER, LYOPHILIZED, FOR SOLUTION INTRAVENOUS at 06:07

## 2021-01-01 RX ADMIN — ASPIRIN 325 MG ORAL TABLET 325 MG: 325 PILL ORAL at 11:02

## 2021-01-01 RX ADMIN — ATORVASTATIN CALCIUM 20 MG: 20 TABLET, FILM COATED ORAL at 09:04

## 2021-01-01 RX ADMIN — OXYCODONE HYDROCHLORIDE 5 MG: 5 TABLET ORAL at 01:02

## 2021-01-01 RX ADMIN — DAPTOMYCIN 1125 MG: 350 INJECTION, POWDER, LYOPHILIZED, FOR SOLUTION INTRAVENOUS at 07:09

## 2021-01-01 RX ADMIN — WARFARIN SODIUM 10 MG: 5 TABLET ORAL at 05:10

## 2021-01-01 RX ADMIN — WARFARIN SODIUM 7.5 MG: 7.5 TABLET ORAL at 05:10

## 2021-01-01 RX ADMIN — SPIRONOLACTONE 25 MG: 25 TABLET, FILM COATED ORAL at 09:04

## 2021-01-01 RX ADMIN — LIDOCAINE HYDROCHLORIDE 60 MG: 20 INJECTION, SOLUTION INTRAVENOUS at 03:05

## 2021-01-01 RX ADMIN — CEFEPIME 1 G: 1 INJECTION, POWDER, FOR SOLUTION INTRAMUSCULAR; INTRAVENOUS at 01:07

## 2021-01-01 RX ADMIN — BUMETANIDE 1 MG: 1 TABLET ORAL at 06:04

## 2021-01-01 RX ADMIN — LIDOCAINE HYDROCHLORIDE 7 ML: 10 INJECTION, SOLUTION INFILTRATION; PERINEURAL at 08:08

## 2021-01-01 RX ADMIN — Medication 10 ML: at 08:10

## 2021-01-01 RX ADMIN — LISINOPRIL 5 MG: 5 TABLET ORAL at 08:03

## 2021-01-01 RX ADMIN — SODIUM CHLORIDE: 234 INJECTION INTRAMUSCULAR; INTRAVENOUS; SUBCUTANEOUS at 08:07

## 2021-01-01 RX ADMIN — LIDOCAINE HYDROCHLORIDE 1 ML: 10 INJECTION, SOLUTION INFILTRATION; PERINEURAL at 03:08

## 2021-01-01 RX ADMIN — POTASSIUM CHLORIDE 40 MEQ: 1500 TABLET, EXTENDED RELEASE ORAL at 12:07

## 2021-01-01 RX ADMIN — HEPARIN SODIUM AND DEXTROSE 18 UNITS/KG/HR: 10000; 5 INJECTION INTRAVENOUS at 07:08

## 2021-01-01 RX ADMIN — MEROPENEM 2 G: 1 INJECTION INTRAVENOUS at 01:10

## 2021-01-01 RX ADMIN — AMLODIPINE BESYLATE 10 MG: 10 TABLET ORAL at 09:02

## 2021-01-01 RX ADMIN — Medication 20 MG: at 03:05

## 2021-01-01 RX ADMIN — HEPARIN SODIUM AND DEXTROSE 12 UNITS/KG/HR: 10000; 5 INJECTION INTRAVENOUS at 10:04

## 2021-01-01 RX ADMIN — VANCOMYCIN HYDROCHLORIDE 2000 MG: 10 INJECTION, POWDER, LYOPHILIZED, FOR SOLUTION INTRAVENOUS at 12:02

## 2021-01-01 RX ADMIN — SPIRONOLACTONE 25 MG: 25 TABLET, FILM COATED ORAL at 11:02

## 2021-01-01 RX ADMIN — HYDRALAZINE HYDROCHLORIDE 100 MG: 50 TABLET, FILM COATED ORAL at 02:05

## 2021-01-01 RX ADMIN — PIPERACILLIN SODIUM AND TAZOBACTAM SODIUM 4.5 G: 4; .5 INJECTION, POWDER, FOR SOLUTION INTRAVENOUS at 01:10

## 2021-01-01 RX ADMIN — MELATONIN TAB 3 MG 6 MG: 3 TAB at 02:05

## 2021-01-01 RX ADMIN — MUPIROCIN: 20 OINTMENT TOPICAL at 09:05

## 2021-01-01 RX ADMIN — LISINOPRIL 5 MG: 5 TABLET ORAL at 04:09

## 2021-01-01 RX ADMIN — FLUDROCORTISONE ACETATE 100 MCG: 0.1 TABLET ORAL at 09:08

## 2021-01-01 RX ADMIN — VANCOMYCIN HYDROCHLORIDE 2000 MG: 100 INJECTION, POWDER, LYOPHILIZED, FOR SOLUTION INTRAVENOUS at 07:03

## 2021-01-01 RX ADMIN — HYDRALAZINE HYDROCHLORIDE 100 MG: 50 TABLET, FILM COATED ORAL at 02:02

## 2021-01-01 RX ADMIN — SPIRONOLACTONE 25 MG: 25 TABLET, FILM COATED ORAL at 08:04

## 2021-01-01 RX ADMIN — ALTEPLASE 2 MG: 2.2 INJECTION, POWDER, LYOPHILIZED, FOR SOLUTION INTRAVENOUS at 11:08

## 2021-01-01 RX ADMIN — CEFAZOLIN SODIUM 1 G: 1 SOLUTION INTRAVENOUS at 03:05

## 2021-01-01 RX ADMIN — Medication 2 G: at 10:10

## 2021-01-01 RX ADMIN — OXYCODONE 5 MG: 5 TABLET ORAL at 05:02

## 2021-01-01 RX ADMIN — MUPIROCIN: 20 OINTMENT TOPICAL at 08:09

## 2021-01-01 RX ADMIN — POTASSIUM CHLORIDE 40 MEQ: 1500 TABLET, EXTENDED RELEASE ORAL at 10:03

## 2021-01-01 RX ADMIN — SODIUM CHLORIDE: 0.9 INJECTION, SOLUTION INTRAVENOUS at 11:08

## 2021-01-01 RX ADMIN — ETOMIDATE 8 MG: 2 INJECTION INTRAVENOUS at 11:08

## 2021-01-01 RX ADMIN — WARFARIN SODIUM 11.25 MG: 10 TABLET ORAL at 04:02

## 2021-01-01 RX ADMIN — Medication 400 MG: at 08:08

## 2021-01-01 RX ADMIN — HYDRALAZINE HYDROCHLORIDE 100 MG: 50 TABLET ORAL at 04:08

## 2021-01-01 RX ADMIN — SODIUM CHLORIDE: 234 INJECTION INTRAMUSCULAR; INTRAVENOUS; SUBCUTANEOUS at 01:07

## 2021-01-01 RX ADMIN — SODIUM CHLORIDE 250 ML: 0.9 INJECTION, SOLUTION INTRAVENOUS at 03:10

## 2021-01-01 RX ADMIN — LIDOCAINE 1 PATCH: 50 PATCH TOPICAL at 06:08

## 2021-01-01 RX ADMIN — WARFARIN SODIUM 2.5 MG: 2.5 TABLET ORAL at 05:02

## 2021-01-01 RX ADMIN — WARFARIN SODIUM 7.5 MG: 7.5 TABLET ORAL at 06:04

## 2021-01-01 RX ADMIN — HEPARIN SODIUM AND DEXTROSE 19 UNITS/KG/HR: 10000; 5 INJECTION INTRAVENOUS at 02:08

## 2021-01-01 RX ADMIN — MAGNESIUM SULFATE 2 G: 2 INJECTION INTRAVENOUS at 11:07

## 2021-01-01 RX ADMIN — HYDRALAZINE HYDROCHLORIDE 100 MG: 50 TABLET ORAL at 03:08

## 2021-01-01 RX ADMIN — ONDANSETRON 4 MG: 2 INJECTION, SOLUTION INTRAMUSCULAR; INTRAVENOUS at 02:02

## 2021-01-01 RX ADMIN — OXYCODONE 5 MG: 5 TABLET ORAL at 09:02

## 2021-01-01 RX ADMIN — BENZONATATE 100 MG: 100 CAPSULE ORAL at 10:03

## 2021-01-01 RX ADMIN — ACETAMINOPHEN 650 MG: 325 TABLET ORAL at 01:08

## 2021-01-01 RX ADMIN — PREDNISONE 50 MG: 20 TABLET ORAL at 09:08

## 2021-01-01 RX ADMIN — ONDANSETRON 4 MG: 2 INJECTION INTRAMUSCULAR; INTRAVENOUS at 03:05

## 2021-01-01 RX ADMIN — VANCOMYCIN HYDROCHLORIDE 1000 MG: 1 INJECTION, POWDER, LYOPHILIZED, FOR SOLUTION INTRAVENOUS at 12:04

## 2021-01-01 RX ADMIN — WARFARIN SODIUM 7.5 MG: 7.5 TABLET ORAL at 10:03

## 2021-01-01 RX ADMIN — OXYCODONE HYDROCHLORIDE AND ACETAMINOPHEN 1 TABLET: 5; 325 TABLET ORAL at 08:08

## 2021-01-01 RX ADMIN — PIPERACILLIN AND TAZOBACTAM 4.5 G: 4; .5 INJECTION, POWDER, LYOPHILIZED, FOR SOLUTION INTRAVENOUS; PARENTERAL at 02:10

## 2021-01-01 RX ADMIN — ACETAMINOPHEN 1000 MG: 500 TABLET ORAL at 12:02

## 2021-01-01 RX ADMIN — VANCOMYCIN HYDROCHLORIDE 1500 MG: 1.5 INJECTION, POWDER, LYOPHILIZED, FOR SOLUTION INTRAVENOUS at 12:07

## 2021-01-01 RX ADMIN — BISACODYL 10 MG: 10 SUPPOSITORY RECTAL at 03:07

## 2021-01-01 RX ADMIN — SODIUM CHLORIDE: 234 INJECTION INTRAMUSCULAR; INTRAVENOUS; SUBCUTANEOUS at 10:07

## 2021-01-01 RX ADMIN — Medication 2 G: at 10:08

## 2021-01-01 RX ADMIN — HEPARIN SODIUM AND DEXTROSE 18 UNITS/KG/HR: 10000; 5 INJECTION INTRAVENOUS at 08:08

## 2021-01-01 RX ADMIN — LISINOPRIL 5 MG: 5 TABLET ORAL at 09:04

## 2021-01-01 RX ADMIN — DIPHENHYDRAMINE HYDROCHLORIDE 50 MG: 50 INJECTION, SOLUTION INTRAMUSCULAR; INTRAVENOUS at 07:08

## 2021-01-01 RX ADMIN — DAPTOMYCIN 700 MG: 350 INJECTION, POWDER, LYOPHILIZED, FOR SOLUTION INTRAVENOUS at 09:02

## 2021-01-01 RX ADMIN — DIPHENHYDRAMINE HYDROCHLORIDE 25 MG: 50 INJECTION, SOLUTION INTRAMUSCULAR; INTRAVENOUS at 12:03

## 2021-01-01 RX ADMIN — WARFARIN SODIUM 11.25 MG: 10 TABLET ORAL at 08:09

## 2021-01-01 RX ADMIN — TRAMADOL HYDROCHLORIDE 50 MG: 50 TABLET, COATED ORAL at 12:08

## 2021-01-01 RX ADMIN — DRONABINOL 2.5 MG: 2.5 CAPSULE ORAL at 01:07

## 2021-01-01 RX ADMIN — HYDRALAZINE HYDROCHLORIDE 100 MG: 50 TABLET ORAL at 05:09

## 2021-01-01 RX ADMIN — DIPHENHYDRAMINE HYDROCHLORIDE 25 MG: 25 CAPSULE ORAL at 08:04

## 2021-01-01 RX ADMIN — DIPHENHYDRAMINE HYDROCHLORIDE 25 MG: 25 CAPSULE ORAL at 12:05

## 2021-01-01 RX ADMIN — SODIUM CHLORIDE: 234 INJECTION INTRAMUSCULAR; INTRAVENOUS; SUBCUTANEOUS at 09:07

## 2021-01-01 RX ADMIN — HYDRALAZINE HYDROCHLORIDE 100 MG: 50 TABLET ORAL at 09:10

## 2021-01-01 RX ADMIN — HEPARIN SODIUM AND DEXTROSE 12 UNITS/KG/HR: 10000; 5 INJECTION INTRAVENOUS at 05:08

## 2021-01-01 RX ADMIN — POLYETHYLENE GLYCOL 3350 17 G: 17 POWDER, FOR SOLUTION ORAL at 10:08

## 2021-01-01 RX ADMIN — CEFEPIME 1 G: 1 INJECTION, POWDER, FOR SOLUTION INTRAMUSCULAR; INTRAVENOUS at 04:07

## 2021-01-01 RX ADMIN — POTASSIUM CHLORIDE 30 MEQ: 10 CAPSULE, COATED, EXTENDED RELEASE ORAL at 06:10

## 2021-01-01 RX ADMIN — VANCOMYCIN HYDROCHLORIDE 1500 MG: 1.5 INJECTION, POWDER, LYOPHILIZED, FOR SOLUTION INTRAVENOUS at 07:07

## 2021-01-01 RX ADMIN — VANCOMYCIN HYDROCHLORIDE 1250 MG: 1.25 INJECTION, POWDER, LYOPHILIZED, FOR SOLUTION INTRAVENOUS at 11:03

## 2021-01-01 RX ADMIN — ATORVASTATIN CALCIUM 20 MG: 20 TABLET, FILM COATED ORAL at 09:02

## 2021-01-01 RX ADMIN — Medication 10 ML: at 12:10

## 2021-01-01 RX ADMIN — IOHEXOL 100 ML: 350 INJECTION, SOLUTION INTRAVENOUS at 02:07

## 2021-01-01 RX ADMIN — ACETAMINOPHEN 650 MG: 325 TABLET ORAL at 02:02

## 2021-01-01 RX ADMIN — VANCOMYCIN HYDROCHLORIDE 1250 MG: 1.25 INJECTION, POWDER, LYOPHILIZED, FOR SOLUTION INTRAVENOUS at 11:02

## 2021-01-01 RX ADMIN — TRAMADOL HYDROCHLORIDE 50 MG: 50 TABLET, COATED ORAL at 09:07

## 2021-01-01 RX ADMIN — PHENYLEPHRINE HYDROCHLORIDE 200 MCG: 10 INJECTION, SOLUTION INTRAMUSCULAR; INTRAVENOUS; SUBCUTANEOUS at 04:05

## 2021-01-01 RX ADMIN — DAPTOMYCIN 1160 MG: 350 INJECTION, POWDER, LYOPHILIZED, FOR SOLUTION INTRAVENOUS at 12:09

## 2021-01-01 RX ADMIN — BUMETANIDE 1 MG: 1 TABLET ORAL at 10:08

## 2021-01-01 RX ADMIN — AMLODIPINE BESYLATE 10 MG: 10 TABLET ORAL at 11:02

## 2021-01-01 RX ADMIN — SODIUM CHLORIDE: 234 INJECTION INTRAMUSCULAR; INTRAVENOUS; SUBCUTANEOUS at 10:08

## 2021-01-01 RX ADMIN — TRAMADOL HYDROCHLORIDE 50 MG: 50 TABLET, COATED ORAL at 02:07

## 2021-01-01 RX ADMIN — PIPERACILLIN SODIUM AND TAZOBACTAM SODIUM 4.5 G: 4; .5 INJECTION, POWDER, FOR SOLUTION INTRAVENOUS at 12:10

## 2021-01-01 RX ADMIN — RIFAMPIN 300 MG: 300 CAPSULE ORAL at 09:09

## 2021-01-01 RX ADMIN — HEPARIN SODIUM AND DEXTROSE 20 UNITS/KG/HR: 10000; 5 INJECTION INTRAVENOUS at 06:08

## 2021-01-01 RX ADMIN — OXYCODONE HYDROCHLORIDE 5 MG: 5 TABLET ORAL at 02:02

## 2021-01-01 RX ADMIN — HEPARIN SODIUM AND DEXTROSE 12 UNITS/KG/HR: 10000; 5 INJECTION INTRAVENOUS at 04:09

## 2021-01-01 RX ADMIN — HYDRALAZINE HYDROCHLORIDE 100 MG: 50 TABLET ORAL at 02:09

## 2021-01-01 RX ADMIN — DEXMEDETOMIDINE HYDROCHLORIDE 94 MCG: 100 INJECTION, SOLUTION, CONCENTRATE INTRAVENOUS at 03:05

## 2021-01-01 RX ADMIN — MAGNESIUM SULFATE IN WATER 2 G: 40 INJECTION, SOLUTION INTRAVENOUS at 07:08

## 2021-01-01 RX ADMIN — WARFARIN SODIUM 7.5 MG: 7.5 TABLET ORAL at 05:03

## 2021-01-01 RX ADMIN — CEFTAROLINE FOSAMIL 600 MG: 600 POWDER, FOR SOLUTION INTRAVENOUS at 12:08

## 2021-01-01 RX ADMIN — HYDRALAZINE HYDROCHLORIDE 100 MG: 50 TABLET, FILM COATED ORAL at 04:04

## 2021-01-01 RX ADMIN — SODIUM CHLORIDE: 9 INJECTION, SOLUTION INTRAVENOUS at 02:02

## 2021-01-01 RX ADMIN — ATORVASTATIN CALCIUM 20 MG: 20 TABLET, FILM COATED ORAL at 10:08

## 2021-01-01 RX ADMIN — ACETAMINOPHEN 650 MG: 325 TABLET ORAL at 05:07

## 2021-01-01 RX ADMIN — SODIUM CHLORIDE: 0.9 INJECTION, SOLUTION INTRAVENOUS at 06:08

## 2021-01-01 RX ADMIN — SODIUM CHLORIDE: 234 INJECTION INTRAMUSCULAR; INTRAVENOUS; SUBCUTANEOUS at 08:08

## 2021-01-01 RX ADMIN — WARFARIN SODIUM 11.25 MG: 10 TABLET ORAL at 04:09

## 2021-01-01 RX ADMIN — TRAMADOL HYDROCHLORIDE 50 MG: 50 TABLET, COATED ORAL at 05:08

## 2021-01-01 RX ADMIN — DAPTOMYCIN 700 MG: 350 INJECTION, POWDER, LYOPHILIZED, FOR SOLUTION INTRAVENOUS at 07:02

## 2021-01-01 RX ADMIN — MAGNESIUM SULFATE 2 G: 2 INJECTION INTRAVENOUS at 07:08

## 2021-01-04 ENCOUNTER — ANTI-COAG VISIT (OUTPATIENT)
Dept: CARDIOLOGY | Facility: CLINIC | Age: 56
End: 2021-01-04
Payer: MEDICARE

## 2021-01-04 DIAGNOSIS — Z79.01 LONG TERM (CURRENT) USE OF ANTICOAGULANTS: ICD-10-CM

## 2021-01-04 DIAGNOSIS — Z95.811 LVAD (LEFT VENTRICULAR ASSIST DEVICE) PRESENT: ICD-10-CM

## 2021-01-04 LAB — INR PPP: 1.4

## 2021-01-04 PROCEDURE — 93793 ANTICOAG MGMT PT WARFARIN: CPT | Mod: S$GLB,,,

## 2021-01-04 PROCEDURE — 93793 PR ANTICOAGULANT MGMT FOR PT TAKING WARFARIN: ICD-10-PCS | Mod: S$GLB,,,

## 2021-01-07 ENCOUNTER — ANTI-COAG VISIT (OUTPATIENT)
Dept: CARDIOLOGY | Facility: CLINIC | Age: 56
End: 2021-01-07
Payer: MEDICARE

## 2021-01-07 DIAGNOSIS — Z79.01 LONG TERM (CURRENT) USE OF ANTICOAGULANTS: ICD-10-CM

## 2021-01-07 DIAGNOSIS — Z95.811 LVAD (LEFT VENTRICULAR ASSIST DEVICE) PRESENT: ICD-10-CM

## 2021-01-07 LAB — INR PPP: 2.8

## 2021-01-07 PROCEDURE — 93793 ANTICOAG MGMT PT WARFARIN: CPT | Mod: S$GLB,,,

## 2021-01-07 PROCEDURE — 93793 PR ANTICOAGULANT MGMT FOR PT TAKING WARFARIN: ICD-10-PCS | Mod: S$GLB,,,

## 2021-01-11 ENCOUNTER — ANTI-COAG VISIT (OUTPATIENT)
Dept: CARDIOLOGY | Facility: CLINIC | Age: 56
End: 2021-01-11
Payer: MEDICARE

## 2021-01-11 DIAGNOSIS — Z79.01 LONG TERM (CURRENT) USE OF ANTICOAGULANTS: ICD-10-CM

## 2021-01-11 DIAGNOSIS — Z95.811 LVAD (LEFT VENTRICULAR ASSIST DEVICE) PRESENT: ICD-10-CM

## 2021-01-11 LAB — INR PPP: 2.3

## 2021-01-11 PROCEDURE — 93793 ANTICOAG MGMT PT WARFARIN: CPT | Mod: S$GLB,,,

## 2021-01-11 PROCEDURE — 93793 PR ANTICOAGULANT MGMT FOR PT TAKING WARFARIN: ICD-10-PCS | Mod: S$GLB,,,

## 2021-01-14 DIAGNOSIS — I50.42 CHRONIC COMBINED SYSTOLIC AND DIASTOLIC CONGESTIVE HEART FAILURE: ICD-10-CM

## 2021-01-14 RX ORDER — AMLODIPINE BESYLATE 10 MG/1
10 TABLET ORAL DAILY
Qty: 30 TABLET | Refills: 11 | Status: SHIPPED | OUTPATIENT
Start: 2021-01-14 | End: 2021-01-01 | Stop reason: SDUPTHER

## 2021-01-14 RX ORDER — ATORVASTATIN CALCIUM 20 MG/1
20 TABLET, FILM COATED ORAL DAILY
Qty: 30 TABLET | Refills: 11 | Status: SHIPPED | OUTPATIENT
Start: 2021-01-14 | End: 2021-01-01 | Stop reason: SDUPTHER

## 2021-01-14 RX ORDER — SPIRONOLACTONE 25 MG/1
25 TABLET ORAL DAILY
Qty: 30 TABLET | Refills: 11 | Status: SHIPPED | OUTPATIENT
Start: 2021-01-14 | End: 2021-01-01 | Stop reason: SDUPTHER

## 2021-01-14 RX ORDER — FUROSEMIDE 20 MG/1
60 TABLET ORAL ONCE AS NEEDED
Qty: 30 TABLET | Refills: 3 | Status: SHIPPED | OUTPATIENT
Start: 2021-01-14 | End: 2021-01-25 | Stop reason: ALTCHOICE

## 2021-01-14 RX ORDER — ASPIRIN 325 MG
325 TABLET ORAL DAILY
Qty: 30 TABLET | Refills: 11 | Status: SHIPPED | OUTPATIENT
Start: 2021-01-14 | End: 2021-01-01 | Stop reason: SDUPTHER

## 2021-01-15 ENCOUNTER — TELEPHONE (OUTPATIENT)
Dept: TRANSPLANT | Facility: CLINIC | Age: 56
End: 2021-01-15

## 2021-01-19 ENCOUNTER — ANTI-COAG VISIT (OUTPATIENT)
Dept: CARDIOLOGY | Facility: CLINIC | Age: 56
End: 2021-01-19
Payer: MEDICARE

## 2021-01-19 DIAGNOSIS — Z95.811 LVAD (LEFT VENTRICULAR ASSIST DEVICE) PRESENT: ICD-10-CM

## 2021-01-19 DIAGNOSIS — Z79.01 LONG TERM (CURRENT) USE OF ANTICOAGULANTS: ICD-10-CM

## 2021-01-19 LAB — INR PPP: 3.7

## 2021-01-19 PROCEDURE — 93793 ANTICOAG MGMT PT WARFARIN: CPT | Mod: S$GLB,,,

## 2021-01-19 PROCEDURE — 93793 PR ANTICOAGULANT MGMT FOR PT TAKING WARFARIN: ICD-10-PCS | Mod: S$GLB,,,

## 2021-01-21 LAB
BILIRUBIN TOTAL: 0.55
BNP: 126
CRP SERPL-MCNC: 2.7 MG/L
EXT ALBUMIN: 4.1
EXT ALT: 16
EXT AST: 26
EXT BUN: 18
EXT CALCIUM: 9.9
EXT CHLORIDE: 103
EXT CREATININE: 0.9 MG/DL
EXT GLUCOSE: 104
EXT HEMATOCRIT: 40.4
EXT HEMOGLOBIN: 12.3
EXT PLATELETS: 326
EXT POTASSIUM: 4
EXT PROTEIN TOTAL: 8.6
EXT SODIUM: 141 MMOL/L
EXT WBC: 7.6
INR PPP: 2.3
LDH SERPL L TO P-CCNC: 408 U/L (ref 80–250)

## 2021-01-22 ENCOUNTER — ANTI-COAG VISIT (OUTPATIENT)
Dept: CARDIOLOGY | Facility: CLINIC | Age: 56
End: 2021-01-22
Payer: MEDICARE

## 2021-01-22 ENCOUNTER — TELEPHONE (OUTPATIENT)
Dept: TRANSPLANT | Facility: CLINIC | Age: 56
End: 2021-01-22

## 2021-01-22 DIAGNOSIS — Z79.01 LONG TERM (CURRENT) USE OF ANTICOAGULANTS: ICD-10-CM

## 2021-01-22 DIAGNOSIS — Z95.811 LVAD (LEFT VENTRICULAR ASSIST DEVICE) PRESENT: ICD-10-CM

## 2021-01-22 PROCEDURE — 93793 PR ANTICOAGULANT MGMT FOR PT TAKING WARFARIN: ICD-10-PCS | Mod: S$GLB,,,

## 2021-01-22 PROCEDURE — 93793 ANTICOAG MGMT PT WARFARIN: CPT | Mod: S$GLB,,,

## 2021-01-25 ENCOUNTER — CLINICAL SUPPORT (OUTPATIENT)
Dept: TRANSPLANT | Facility: CLINIC | Age: 56
End: 2021-01-25
Payer: MEDICARE

## 2021-01-25 ENCOUNTER — OFFICE VISIT (OUTPATIENT)
Dept: TRANSPLANT | Facility: CLINIC | Age: 56
End: 2021-01-25
Payer: MEDICARE

## 2021-01-25 ENCOUNTER — ANTI-COAG VISIT (OUTPATIENT)
Dept: CARDIOLOGY | Facility: CLINIC | Age: 56
End: 2021-01-25
Payer: MEDICARE

## 2021-01-25 ENCOUNTER — OFFICE VISIT (OUTPATIENT)
Dept: INFECTIOUS DISEASES | Facility: CLINIC | Age: 56
End: 2021-01-25
Payer: MEDICARE

## 2021-01-25 ENCOUNTER — HOSPITAL ENCOUNTER (OUTPATIENT)
Dept: PULMONOLOGY | Facility: CLINIC | Age: 56
Discharge: HOME | End: 2021-01-25
Payer: MEDICARE

## 2021-01-25 VITALS
BODY MASS INDEX: 32.96 KG/M2 | WEIGHT: 210 LBS | TEMPERATURE: 98 F | HEIGHT: 67 IN | WEIGHT: 218 LBS | SYSTOLIC BLOOD PRESSURE: 88 MMHG | BODY MASS INDEX: 34.21 KG/M2 | HEIGHT: 67 IN

## 2021-01-25 DIAGNOSIS — Z95.810 AICD (AUTOMATIC CARDIOVERTER/DEFIBRILLATOR) PRESENT: ICD-10-CM

## 2021-01-25 DIAGNOSIS — Z79.01 LONG TERM (CURRENT) USE OF ANTICOAGULANTS: ICD-10-CM

## 2021-01-25 DIAGNOSIS — Z95.811 LVAD (LEFT VENTRICULAR ASSIST DEVICE) PRESENT: ICD-10-CM

## 2021-01-25 DIAGNOSIS — Z79.01 ANTICOAGULATION MONITORING, INR RANGE 2-3: ICD-10-CM

## 2021-01-25 DIAGNOSIS — Z95.811 HEART REPLACED BY HEART ASSIST DEVICE: ICD-10-CM

## 2021-01-25 DIAGNOSIS — Z95.811 LVAD (LEFT VENTRICULAR ASSIST DEVICE) PRESENT: Primary | ICD-10-CM

## 2021-01-25 DIAGNOSIS — I50.42 CHRONIC COMBINED SYSTOLIC AND DIASTOLIC HEART FAILURE: ICD-10-CM

## 2021-01-25 DIAGNOSIS — J44.1 COPD EXACERBATION: ICD-10-CM

## 2021-01-25 DIAGNOSIS — A49.02 MRSA INFECTION: ICD-10-CM

## 2021-01-25 DIAGNOSIS — T82.7XXA INFECTION ASSOCIATED WITH DRIVELINE OF LEFT VENTRICULAR ASSIST DEVICE (LVAD): Primary | ICD-10-CM

## 2021-01-25 DIAGNOSIS — I42.8 NON-ISCHEMIC CARDIOMYOPATHY: ICD-10-CM

## 2021-01-25 PROCEDURE — 3078F PR MOST RECENT DIASTOLIC BLOOD PRESSURE < 80 MM HG: ICD-10-PCS | Mod: CPTII,S$GLB,, | Performed by: INTERNAL MEDICINE

## 2021-01-25 PROCEDURE — 99999 PR PBB SHADOW E&M-EST. PATIENT-LVL IV: ICD-10-PCS | Mod: PBBFAC,,, | Performed by: INTERNAL MEDICINE

## 2021-01-25 PROCEDURE — 99214 PR OFFICE/OUTPT VISIT, EST, LEVL IV, 30-39 MIN: ICD-10-PCS | Mod: S$GLB,,, | Performed by: INTERNAL MEDICINE

## 2021-01-25 PROCEDURE — 3078F DIAST BP <80 MM HG: CPT | Mod: CPTII,S$GLB,, | Performed by: INTERNAL MEDICINE

## 2021-01-25 PROCEDURE — 99214 OFFICE O/P EST MOD 30 MIN: CPT | Mod: S$GLB,,, | Performed by: INTERNAL MEDICINE

## 2021-01-25 PROCEDURE — 1126F AMNT PAIN NOTED NONE PRSNT: CPT | Mod: S$GLB,,, | Performed by: INTERNAL MEDICINE

## 2021-01-25 PROCEDURE — 99999 PR PBB SHADOW E&M-EST. PATIENT-LVL I: CPT | Mod: PBBFAC,,, | Performed by: INTERNAL MEDICINE

## 2021-01-25 PROCEDURE — 3008F BODY MASS INDEX DOCD: CPT | Mod: CPTII,S$GLB,, | Performed by: INTERNAL MEDICINE

## 2021-01-25 PROCEDURE — 3074F SYST BP LT 130 MM HG: CPT | Mod: CPTII,S$GLB,, | Performed by: INTERNAL MEDICINE

## 2021-01-25 PROCEDURE — 99999 PR PBB SHADOW E&M-EST. PATIENT-LVL IV: CPT | Mod: PBBFAC,,, | Performed by: INTERNAL MEDICINE

## 2021-01-25 PROCEDURE — 94618 PULMONARY STRESS TESTING: CPT | Mod: S$GLB,,, | Performed by: INTERNAL MEDICINE

## 2021-01-25 PROCEDURE — 99999 PR PBB SHADOW E&M-EST. PATIENT-LVL I: ICD-10-PCS | Mod: PBBFAC,,, | Performed by: INTERNAL MEDICINE

## 2021-01-25 PROCEDURE — 1126F PR PAIN SEVERITY QUANTIFIED, NO PAIN PRESENT: ICD-10-PCS | Mod: S$GLB,,, | Performed by: INTERNAL MEDICINE

## 2021-01-25 PROCEDURE — 93750 OP LVAD INTERROGATION: ICD-10-PCS | Mod: S$GLB,,, | Performed by: INTERNAL MEDICINE

## 2021-01-25 PROCEDURE — 93750 INTERROGATION VAD IN PERSON: CPT | Mod: S$GLB,,, | Performed by: INTERNAL MEDICINE

## 2021-01-25 PROCEDURE — 3008F PR BODY MASS INDEX (BMI) DOCUMENTED: ICD-10-PCS | Mod: CPTII,S$GLB,, | Performed by: INTERNAL MEDICINE

## 2021-01-25 PROCEDURE — 94618 PULMONARY STRESS TESTING: ICD-10-PCS | Mod: S$GLB,,, | Performed by: INTERNAL MEDICINE

## 2021-01-25 PROCEDURE — 3074F PR MOST RECENT SYSTOLIC BLOOD PRESSURE < 130 MM HG: ICD-10-PCS | Mod: CPTII,S$GLB,, | Performed by: INTERNAL MEDICINE

## 2021-01-25 RX ORDER — DOXYCYCLINE 100 MG/1
100 CAPSULE ORAL 2 TIMES DAILY
Qty: 60 CAPSULE | Refills: 5 | Status: ON HOLD | OUTPATIENT
Start: 2021-01-25 | End: 2021-01-01 | Stop reason: HOSPADM

## 2021-01-25 RX ORDER — ALBUTEROL SULFATE 90 UG/1
2 AEROSOL, METERED RESPIRATORY (INHALATION) EVERY 6 HOURS PRN
Qty: 18 G | Refills: 3 | Status: SHIPPED | OUTPATIENT
Start: 2021-01-25 | End: 2022-01-01 | Stop reason: SDUPTHER

## 2021-01-27 ENCOUNTER — TELEPHONE (OUTPATIENT)
Dept: TRANSPLANT | Facility: CLINIC | Age: 56
End: 2021-01-27

## 2021-01-27 LAB
BILIRUBIN TOTAL: 0.7
BNP: 136
CRP SERPL-MCNC: 8.5 MG/L
EXT ALBUMIN: 4.3
EXT ALT: 17
EXT AST: 24
EXT BUN: 13
EXT CALCIUM: 10.3
EXT CHLORIDE: 98
EXT CREATININE: 0.9 MG/DL
EXT GLUCOSE: 121
EXT HEMATOCRIT: 42
EXT HEMOGLOBIN: 13.2
EXT PLATELETS: 403
EXT POTASSIUM: 4.2
EXT PROTEIN TOTAL: 8.8
EXT SODIUM: 137 MMOL/L
EXT WBC: 10.8
LDH SERPL L TO P-CCNC: 411 U/L (ref 80–250)

## 2021-01-28 ENCOUNTER — ANTI-COAG VISIT (OUTPATIENT)
Dept: CARDIOLOGY | Facility: CLINIC | Age: 56
End: 2021-01-28
Payer: MEDICARE

## 2021-01-28 DIAGNOSIS — Z79.01 LONG TERM (CURRENT) USE OF ANTICOAGULANTS: ICD-10-CM

## 2021-01-28 DIAGNOSIS — Z95.811 LVAD (LEFT VENTRICULAR ASSIST DEVICE) PRESENT: Primary | ICD-10-CM

## 2021-01-28 LAB — INR PPP: 1.9

## 2021-01-28 PROCEDURE — 93793 PR ANTICOAGULANT MGMT FOR PT TAKING WARFARIN: ICD-10-PCS | Mod: S$GLB,,,

## 2021-01-28 PROCEDURE — 93793 ANTICOAG MGMT PT WARFARIN: CPT | Mod: S$GLB,,,

## 2021-02-02 ENCOUNTER — ANTI-COAG VISIT (OUTPATIENT)
Dept: CARDIOLOGY | Facility: CLINIC | Age: 56
End: 2021-02-02
Payer: MEDICARE

## 2021-02-02 DIAGNOSIS — Z79.01 LONG TERM (CURRENT) USE OF ANTICOAGULANTS: ICD-10-CM

## 2021-02-02 DIAGNOSIS — Z95.811 LVAD (LEFT VENTRICULAR ASSIST DEVICE) PRESENT: Primary | ICD-10-CM

## 2021-02-02 LAB — INR PPP: 3.9

## 2021-02-02 PROCEDURE — 93793 PR ANTICOAGULANT MGMT FOR PT TAKING WARFARIN: ICD-10-PCS | Mod: S$GLB,,,

## 2021-02-02 PROCEDURE — 93793 ANTICOAG MGMT PT WARFARIN: CPT | Mod: S$GLB,,,

## 2021-02-04 ENCOUNTER — ANTI-COAG VISIT (OUTPATIENT)
Dept: CARDIOLOGY | Facility: CLINIC | Age: 56
End: 2021-02-04
Payer: MEDICARE

## 2021-02-04 DIAGNOSIS — Z95.811 LVAD (LEFT VENTRICULAR ASSIST DEVICE) PRESENT: Primary | ICD-10-CM

## 2021-02-04 DIAGNOSIS — Z79.01 LONG TERM (CURRENT) USE OF ANTICOAGULANTS: ICD-10-CM

## 2021-02-04 LAB — INR PPP: 1.5

## 2021-02-04 PROCEDURE — 93793 ANTICOAG MGMT PT WARFARIN: CPT | Mod: S$GLB,,,

## 2021-02-04 PROCEDURE — 93793 PR ANTICOAGULANT MGMT FOR PT TAKING WARFARIN: ICD-10-PCS | Mod: S$GLB,,,

## 2021-02-08 ENCOUNTER — TELEPHONE (OUTPATIENT)
Dept: TRANSPLANT | Facility: CLINIC | Age: 56
End: 2021-02-08

## 2021-02-09 ENCOUNTER — TELEPHONE (OUTPATIENT)
Dept: TRANSPLANT | Facility: CLINIC | Age: 56
End: 2021-02-09

## 2021-02-09 ENCOUNTER — ANTI-COAG VISIT (OUTPATIENT)
Dept: CARDIOLOGY | Facility: CLINIC | Age: 56
End: 2021-02-09
Payer: MEDICARE

## 2021-02-09 DIAGNOSIS — Z79.01 LONG TERM (CURRENT) USE OF ANTICOAGULANTS: ICD-10-CM

## 2021-02-09 DIAGNOSIS — Z95.811 LVAD (LEFT VENTRICULAR ASSIST DEVICE) PRESENT: Primary | ICD-10-CM

## 2021-02-09 LAB
BNP: 288
EXT BUN: 9
EXT CALCIUM: 9
EXT CHLORIDE: 103
EXT CREATININE: 0.7 MG/DL
EXT GLUCOSE: 112
EXT POTASSIUM: 3.3
EXT SODIUM: 139 MMOL/L
INR PPP: 1.3

## 2021-02-09 PROCEDURE — 93793 ANTICOAG MGMT PT WARFARIN: CPT | Mod: S$GLB,,,

## 2021-02-09 PROCEDURE — 93793 PR ANTICOAGULANT MGMT FOR PT TAKING WARFARIN: ICD-10-PCS | Mod: S$GLB,,,

## 2021-02-09 RX ORDER — ENOXAPARIN SODIUM 100 MG/ML
50 INJECTION SUBCUTANEOUS EVERY 12 HOURS
Qty: 10 SYRINGE | Refills: 1 | Status: ON HOLD | OUTPATIENT
Start: 2021-02-09 | End: 2021-01-01 | Stop reason: HOSPADM

## 2021-02-11 ENCOUNTER — TELEPHONE (OUTPATIENT)
Dept: TRANSPLANT | Facility: CLINIC | Age: 56
End: 2021-02-11

## 2021-02-12 ENCOUNTER — ANTI-COAG VISIT (OUTPATIENT)
Dept: CARDIOLOGY | Facility: CLINIC | Age: 56
End: 2021-02-12
Payer: MEDICARE

## 2021-02-12 DIAGNOSIS — Z79.01 LONG TERM (CURRENT) USE OF ANTICOAGULANTS: ICD-10-CM

## 2021-02-12 DIAGNOSIS — Z95.811 LVAD (LEFT VENTRICULAR ASSIST DEVICE) PRESENT: Primary | ICD-10-CM

## 2021-02-12 LAB — INR PPP: 1.6

## 2021-02-12 PROCEDURE — 93793 ANTICOAG MGMT PT WARFARIN: CPT | Mod: S$GLB,,,

## 2021-02-12 PROCEDURE — 93793 PR ANTICOAGULANT MGMT FOR PT TAKING WARFARIN: ICD-10-PCS | Mod: S$GLB,,,

## 2021-02-16 PROBLEM — M54.2 NECK PAIN: Status: ACTIVE | Noted: 2021-01-01

## 2021-02-16 PROBLEM — R79.1 SUPRATHERAPEUTIC INR: Status: ACTIVE | Noted: 2021-01-01

## 2021-02-19 PROBLEM — B95.8 BACTEREMIA DUE TO STAPHYLOCOCCUS: Status: ACTIVE | Noted: 2021-01-01

## 2021-02-19 PROBLEM — R78.81 BACTEREMIA: Status: ACTIVE | Noted: 2021-01-01

## 2021-02-19 PROBLEM — R78.81 POSITIVE BLOOD CULTURES: Status: ACTIVE | Noted: 2021-01-01

## 2021-02-19 NOTE — TELEPHONE ENCOUNTER
Patient has positive blood cultures drawn from 2/16 resembling staph  in both bottles. Patient was informed to come back to the emergency room and will admit him for bacteremia

## 2021-02-22 PROBLEM — B95.8 BACTEREMIA DUE TO STAPHYLOCOCCUS: Status: RESOLVED | Noted: 2021-01-01 | Resolved: 2021-01-01

## 2021-02-22 PROBLEM — R78.81 BACTEREMIA DUE TO STAPHYLOCOCCUS: Status: RESOLVED | Noted: 2021-01-01 | Resolved: 2021-01-01

## 2021-02-22 PROBLEM — B95.62 MRSA BACTEREMIA: Status: ACTIVE | Noted: 2021-01-01

## 2021-02-23 PROBLEM — M54.2 NECK PAIN: Status: RESOLVED | Noted: 2021-01-01 | Resolved: 2021-01-01

## 2021-02-23 PROBLEM — Z95.810 AICD (AUTOMATIC CARDIOVERTER/DEFIBRILLATOR) PRESENT: Status: RESOLVED | Noted: 2019-02-27 | Resolved: 2021-01-01

## 2021-02-23 PROBLEM — R79.1 SUPRATHERAPEUTIC INR: Status: RESOLVED | Noted: 2021-01-01 | Resolved: 2021-01-01

## 2021-02-23 PROBLEM — I50.23: Status: RESOLVED | Noted: 2019-02-12 | Resolved: 2021-01-01

## 2021-02-23 PROBLEM — Z95.811 LVAD (LEFT VENTRICULAR ASSIST DEVICE) PRESENT: Status: RESOLVED | Noted: 2020-02-06 | Resolved: 2021-01-01

## 2021-02-28 PROBLEM — I42.0 DCM (DILATED CARDIOMYOPATHY): Status: ACTIVE | Noted: 2019-02-13

## 2021-03-12 PROBLEM — R74.8 ELEVATED CPK: Status: ACTIVE | Noted: 2021-01-01

## 2021-03-13 PROBLEM — R73.9 ACUTE HYPERGLYCEMIA: Status: RESOLVED | Noted: 2020-01-23 | Resolved: 2021-01-01

## 2021-03-13 PROBLEM — D72.829 LEUKOCYTOSIS: Status: RESOLVED | Noted: 2020-02-17 | Resolved: 2021-01-01

## 2021-04-27 PROBLEM — B99.9 INFECTION: Status: ACTIVE | Noted: 2021-01-01

## 2021-05-27 PROBLEM — E44.0 MODERATE MALNUTRITION: Status: RESOLVED | Noted: 2020-01-23 | Resolved: 2021-01-01

## 2021-07-21 PROBLEM — I61.1 NONTRAUMATIC CORTICAL HEMORRHAGE OF LEFT CEREBRAL HEMISPHERE: Status: ACTIVE | Noted: 2021-01-01

## 2021-07-21 PROBLEM — I61.9 LEFT-SIDED NONTRAUMATIC INTRACEREBRAL HEMORRHAGE: Status: ACTIVE | Noted: 2021-01-01

## 2021-07-21 PROBLEM — G93.5 BRAIN COMPRESSION: Status: ACTIVE | Noted: 2021-01-01

## 2021-07-21 PROBLEM — G93.6 VASOGENIC CEREBRAL EDEMA: Status: ACTIVE | Noted: 2021-01-01

## 2021-07-24 PROBLEM — A41.02 SEPSIS DUE TO METHICILLIN RESISTANT STAPHYLOCOCCUS AUREUS (MRSA): Status: ACTIVE | Noted: 2021-01-01

## 2021-08-03 NOTE — PLAN OF CARE
Problem: Infection  Goal: Infection Symptom Resolution  Intervention: Prevent or Manage Infection  Flowsheets (Taken 2/4/2020 4787)  Fever Reduction/Comfort Measures: lightweight bedding; lightweight clothing  CMICU DAILY GOALS       A: Awake    RASS: Goal - RASS Goal: 0-->alert and calm  Actual - RASS ([place] Agitation-Sedation Scale): 0-->alert and calm   Restraint necessity: Clinical Justification: Removing medical devices  B: Breath   SBT: Not intubated   C: Coordinate A & B, analgesics/sedatives   Pain: managed    SAT: NA  D: Delirium   CAM-ICU: Overall CAM-ICU: Negative  E: Early Mobility   MOVE Screen: Fail   Activity: Activity Management: bedrest maintained per order  FAS: Feeding/Nutrition   Diet order: Diet/Nutrition Received: restrict fluids, regular,   Fluid restriction: Fluid Requirement: 1000 FR  T: Thrombus   DVT prophylaxis: VTE Required Core Measure: Pharmacological prophylaxis initiated/maintained  H: HOB Elevation   Head of Bed (HOB): HOB at 15 degrees  U: Ulcer Prophylaxis   GI: yes  G: Glucose control   managed    S: Skin   Bundle compliance: yes   Bathing/Skin Care: bath, chlorhexidine Date: 2/3  B: Bowel Function   no issues   I: Indwelling Catheters   Mc necessity: [REMOVED]      Urethral Catheter 01/15/20 2030 Straight-tip 16 Fr.-Reason for Continuing Urinary Catheterization: Critically ill in ICU requiring intensive monitoring   CVC necessity: Yes   IPAD offered: Yes  D: De-escalation Antibx   Yes  Plan for the day   Optimize for LVAD on Thursday  Family/Goals of care/Code Status   Code Status: Full Code     No acute events throughout day, VS and assessment per flow sheet, patient progressing towards goals as tolerated, plan of care reviewed with Saba Lott and family, all concerns addressed, will continue to monitor.     [facility] calling and is needing BG monitoring device. Call pharmacy they received supplies, but not the device. Please advise, thank you.

## 2021-08-05 PROBLEM — T82.9XXA: Status: ACTIVE | Noted: 2021-01-01

## 2021-08-07 PROBLEM — E87.1 HYPONATREMIA: Status: ACTIVE | Noted: 2021-01-01

## 2021-08-10 PROBLEM — Z51.5 PALLIATIVE CARE ENCOUNTER: Status: ACTIVE | Noted: 2021-01-01

## 2021-08-12 NOTE — ASSESSMENT & PLAN NOTE
Body mass index is 28.92 kg/m².  may contribute to insulin resistance       Worsening symptoms over the past 4 days.   Pt one year old son has been sick, pt taking care of him     Ida Chambers, RN  08/12/21 4356

## 2021-09-16 NOTE — CARE UPDATE
[address]  [place] [address]  Phone: [phone]  Fax: [phone]            September 17, 2021     Ron Mendieta  [address] [place] - [address]      Dear [facility]: We are sorry that you missed your appointment with Dr. Keily Laureano on 09/15/21. Your health and follow-up medical care are important to us. Please call our office as soon as possible so that we may reschedule your appointment. If you have already rescheduled your appointment, please disregard this letter. Sincerely,        Dr. Juan Alberto Monahan. Riky/CHEN Brief HTS Update Note    Continued aggressive diuresis in anticipation of VAD placement. UOP 3.4L, NN 1.5L. CVP improved to 12. Will recheck CVP in a few hours, plan to turn off tachytherapy on medtronic ICD in anticipation of VAD placement.    Tommy Cochran MD  PGY-4, Cardiology  Pager [phone]

## 2021-10-05 PROBLEM — R51.9 HEADACHE: Status: ACTIVE | Noted: 2021-01-01

## 2021-10-05 PROBLEM — A41.9 SEPSIS: Status: RESOLVED | Noted: 2021-01-01 | Resolved: 2021-01-01

## 2021-10-05 PROBLEM — A41.9 SEPSIS: Status: ACTIVE | Noted: 2021-01-01

## 2021-10-05 PROBLEM — I63.531 ACUTE ISCHEMIC RIGHT PCA STROKE: Status: ACTIVE | Noted: 2021-01-01

## 2021-10-06 PROBLEM — Z51.5 PALLIATIVE CARE ENCOUNTER: Status: RESOLVED | Noted: 2021-01-01 | Resolved: 2021-01-01

## 2021-10-06 PROBLEM — R78.81 BACTEREMIA DUE TO GRAM-NEGATIVE BACTERIA: Status: ACTIVE | Noted: 2021-01-01

## 2021-10-06 PROBLEM — A41.9 SEPSIS WITHOUT ACUTE ORGAN DYSFUNCTION: Status: RESOLVED | Noted: 2021-01-01 | Resolved: 2021-01-01

## 2021-10-19 PROBLEM — Z95.828 S/P PICC CENTRAL LINE PLACEMENT: Status: ACTIVE | Noted: 2021-01-01

## 2021-11-23 PROBLEM — B99.9 INFECTION: Chronic | Status: ACTIVE | Noted: 2021-01-01

## 2021-11-23 PROBLEM — Z95.811 LVAD (LEFT VENTRICULAR ASSIST DEVICE) PRESENT: Chronic | Status: ACTIVE | Noted: 2020-02-06

## 2021-11-23 PROBLEM — T82.9XXA: Chronic | Status: ACTIVE | Noted: 2021-01-01

## 2022-01-01 ENCOUNTER — ANTI-COAG VISIT (OUTPATIENT)
Dept: CARDIOLOGY | Facility: CLINIC | Age: 57
End: 2022-01-01
Payer: MEDICARE

## 2022-01-01 ENCOUNTER — TELEPHONE (OUTPATIENT)
Dept: TRANSPLANT | Facility: CLINIC | Age: 57
End: 2022-01-01
Payer: MEDICARE

## 2022-01-01 ENCOUNTER — TELEPHONE (OUTPATIENT)
Dept: INFECTIOUS DISEASES | Facility: HOSPITAL | Age: 57
End: 2022-01-01
Payer: MEDICARE

## 2022-01-01 ENCOUNTER — TELEPHONE (OUTPATIENT)
Dept: CARDIOTHORACIC SURGERY | Facility: CLINIC | Age: 57
End: 2022-01-01
Payer: MEDICARE

## 2022-01-01 ENCOUNTER — EXTERNAL HOME HEALTH (OUTPATIENT)
Dept: HOME HEALTH SERVICES | Facility: HOSPITAL | Age: 57
End: 2022-01-01
Payer: MEDICARE

## 2022-01-01 ENCOUNTER — PATIENT OUTREACH (OUTPATIENT)
Dept: ADMINISTRATIVE | Facility: CLINIC | Age: 57
End: 2022-01-01
Payer: MEDICARE

## 2022-01-01 ENCOUNTER — TELEPHONE (OUTPATIENT)
Dept: PULMONOLOGY | Facility: CLINIC | Age: 57
End: 2022-01-01
Payer: MEDICARE

## 2022-01-01 ENCOUNTER — ANESTHESIA (OUTPATIENT)
Dept: INTERVENTIONAL RADIOLOGY/VASCULAR | Facility: HOSPITAL | Age: 57
DRG: 871 | End: 2022-01-01
Payer: MEDICARE

## 2022-01-01 ENCOUNTER — LAB VISIT (OUTPATIENT)
Dept: LAB | Facility: HOSPITAL | Age: 57
End: 2022-01-01
Attending: NURSE PRACTITIONER
Payer: MEDICARE

## 2022-01-01 ENCOUNTER — TELEPHONE (OUTPATIENT)
Dept: INFECTIOUS DISEASES | Facility: CLINIC | Age: 57
End: 2022-01-01
Payer: MEDICARE

## 2022-01-01 ENCOUNTER — NURSE TRIAGE (OUTPATIENT)
Dept: ADMINISTRATIVE | Facility: CLINIC | Age: 57
End: 2022-01-01
Payer: MEDICARE

## 2022-01-01 ENCOUNTER — HOSPITAL ENCOUNTER (INPATIENT)
Facility: HOSPITAL | Age: 57
LOS: 1 days | DRG: 314 | End: 2022-02-14
Attending: INTERNAL MEDICINE | Admitting: INTERNAL MEDICINE
Payer: MEDICARE

## 2022-01-01 VITALS
WEIGHT: 199.94 LBS | TEMPERATURE: 98 F | RESPIRATION RATE: 20 BRPM | BODY MASS INDEX: 31.31 KG/M2 | DIASTOLIC BLOOD PRESSURE: 38 MMHG | OXYGEN SATURATION: 10 % | SYSTOLIC BLOOD PRESSURE: 56 MMHG

## 2022-01-01 VITALS
SYSTOLIC BLOOD PRESSURE: 84 MMHG | WEIGHT: 209 LBS | HEART RATE: 108 BPM | BODY MASS INDEX: 32.8 KG/M2 | OXYGEN SATURATION: 95 % | HEIGHT: 67 IN | TEMPERATURE: 98 F | RESPIRATION RATE: 18 BRPM

## 2022-01-01 DIAGNOSIS — Z95.811 LVAD (LEFT VENTRICULAR ASSIST DEVICE) PRESENT: ICD-10-CM

## 2022-01-01 DIAGNOSIS — T82.9XXA COMPLICATION INVOLVING LEFT VENTRICULAR ASSIST DEVICE (LVAD), INITIAL ENCOUNTER: ICD-10-CM

## 2022-01-01 DIAGNOSIS — I42.8 NICM (NONISCHEMIC CARDIOMYOPATHY): ICD-10-CM

## 2022-01-01 DIAGNOSIS — Z79.01 LONG TERM (CURRENT) USE OF ANTICOAGULANTS: Primary | ICD-10-CM

## 2022-01-01 DIAGNOSIS — J44.9 CHRONIC OBSTRUCTIVE PULMONARY DISEASE, UNSPECIFIED COPD TYPE: Primary | ICD-10-CM

## 2022-01-01 DIAGNOSIS — Z79.2 RECEIVING INTRAVENOUS ANTIBIOTIC TREATMENT AS OUTPATIENT: Primary | ICD-10-CM

## 2022-01-01 DIAGNOSIS — J44.1 COPD EXACERBATION: ICD-10-CM

## 2022-01-01 DIAGNOSIS — Z51.5 END OF LIFE CARE: ICD-10-CM

## 2022-01-01 DIAGNOSIS — J44.1 COPD EXACERBATION: Primary | ICD-10-CM

## 2022-01-01 DIAGNOSIS — U07.1 COVID-19 VIRUS DETECTED: ICD-10-CM

## 2022-01-01 DIAGNOSIS — N17.0 ACUTE RENAL FAILURE WITH TUBULAR NECROSIS: ICD-10-CM

## 2022-01-01 DIAGNOSIS — R57.0 CARDIOGENIC SHOCK: Primary | ICD-10-CM

## 2022-01-01 DIAGNOSIS — I50.9 HEART FAILURE: ICD-10-CM

## 2022-01-01 DIAGNOSIS — A41.9 SYSTEMIC INFECTION: Primary | ICD-10-CM

## 2022-01-01 DIAGNOSIS — R06.02 SHORTNESS OF BREATH: ICD-10-CM

## 2022-01-01 PROBLEM — R65.20 SEVERE SEPSIS: Status: ACTIVE | Noted: 2021-01-01

## 2022-01-01 PROBLEM — R79.1 SUBTHERAPEUTIC INTERNATIONAL NORMALIZED RATIO (INR): Status: RESOLVED | Noted: 2021-01-01 | Resolved: 2022-01-01

## 2022-01-01 PROBLEM — F90.9 ADHD: Status: RESOLVED | Noted: 2022-01-01 | Resolved: 2022-01-01

## 2022-01-01 PROBLEM — F90.9 ADHD: Status: ACTIVE | Noted: 2022-01-01

## 2022-01-01 PROBLEM — E87.6 HYPOKALEMIA: Status: ACTIVE | Noted: 2022-01-01

## 2022-01-01 PROBLEM — R65.20 SEVERE SEPSIS: Status: RESOLVED | Noted: 2021-01-01 | Resolved: 2022-01-01

## 2022-01-01 LAB
1,3 BETA GLUCAN SER-MCNC: <31 PG/ML
ABO + RH BLD: NORMAL
ADENOVIRUS: NOT DETECTED
ALBUMIN SERPL BCP-MCNC: 1.9 G/DL (ref 3.5–5.2)
ALBUMIN SERPL BCP-MCNC: 1.9 G/DL (ref 3.5–5.2)
ALBUMIN SERPL BCP-MCNC: 2 G/DL (ref 3.5–5.2)
ALBUMIN SERPL BCP-MCNC: 2 G/DL (ref 3.5–5.2)
ALBUMIN SERPL BCP-MCNC: 2.1 G/DL (ref 3.5–5.2)
ALBUMIN SERPL BCP-MCNC: 2.2 G/DL (ref 3.5–5.2)
ALBUMIN SERPL BCP-MCNC: 2.3 G/DL (ref 3.5–5.2)
ALBUMIN SERPL BCP-MCNC: 2.3 G/DL (ref 3.5–5.2)
ALBUMIN SERPL BCP-MCNC: 2.4 G/DL (ref 3.5–5.2)
ALBUMIN SERPL BCP-MCNC: 2.5 G/DL (ref 3.5–5.2)
ALBUMIN SERPL BCP-MCNC: 2.6 G/DL (ref 3.5–5.2)
ALLENS TEST: ABNORMAL
ALP SERPL-CCNC: 106 U/L (ref 55–135)
ALP SERPL-CCNC: 119 U/L (ref 55–135)
ALP SERPL-CCNC: 70 U/L (ref 55–135)
ALP SERPL-CCNC: 72 U/L (ref 55–135)
ALP SERPL-CCNC: 73 U/L (ref 55–135)
ALP SERPL-CCNC: 80 U/L (ref 55–135)
ALP SERPL-CCNC: 81 U/L (ref 55–135)
ALP SERPL-CCNC: 84 U/L (ref 55–135)
ALP SERPL-CCNC: 87 U/L (ref 55–135)
ALP SERPL-CCNC: 89 U/L (ref 55–135)
ALP SERPL-CCNC: 93 U/L (ref 55–135)
ALT SERPL W/O P-5'-P-CCNC: 10 U/L (ref 10–44)
ALT SERPL W/O P-5'-P-CCNC: 11 U/L (ref 10–44)
ALT SERPL W/O P-5'-P-CCNC: 12 U/L (ref 10–44)
ALT SERPL W/O P-5'-P-CCNC: 13 U/L (ref 10–44)
ALT SERPL W/O P-5'-P-CCNC: 13 U/L (ref 10–44)
ALT SERPL W/O P-5'-P-CCNC: 17 U/L (ref 10–44)
ALT SERPL W/O P-5'-P-CCNC: 19 U/L (ref 10–44)
ALT SERPL W/O P-5'-P-CCNC: 19 U/L (ref 10–44)
ALT SERPL W/O P-5'-P-CCNC: 8 U/L (ref 10–44)
ANION GAP SERPL CALC-SCNC: 10 MMOL/L (ref 8–16)
ANION GAP SERPL CALC-SCNC: 11 MMOL/L (ref 8–16)
ANION GAP SERPL CALC-SCNC: 12 MMOL/L (ref 8–16)
ANION GAP SERPL CALC-SCNC: 15 MMOL/L (ref 8–16)
ANION GAP SERPL CALC-SCNC: 24 MMOL/L (ref 8–16)
ANION GAP SERPL CALC-SCNC: 7 MMOL/L (ref 8–16)
ANION GAP SERPL CALC-SCNC: 7 MMOL/L (ref 8–16)
ANION GAP SERPL CALC-SCNC: 8 MMOL/L (ref 8–16)
ANION GAP SERPL CALC-SCNC: 9 MMOL/L (ref 8–16)
ANISOCYTOSIS BLD QL SMEAR: ABNORMAL
ANISOCYTOSIS BLD QL SMEAR: ABNORMAL
ANISOCYTOSIS BLD QL SMEAR: SLIGHT
ANISOCYTOSIS BLD QL SMEAR: SLIGHT
APTT BLDCRRT: 23.2 SEC (ref 21–32)
APTT BLDCRRT: 28.2 SEC (ref 21–32)
APTT BLDCRRT: 30.9 SEC (ref 21–32)
APTT BLDCRRT: 32.4 SEC (ref 21–32)
APTT BLDCRRT: 32.4 SEC (ref 21–32)
APTT BLDCRRT: 32.6 SEC (ref 21–32)
APTT BLDCRRT: 32.7 SEC (ref 21–32)
APTT BLDCRRT: 33 SEC (ref 21–32)
APTT BLDCRRT: 35 SEC (ref 21–32)
APTT BLDCRRT: 35.3 SEC (ref 21–32)
APTT BLDCRRT: 35.3 SEC (ref 21–32)
APTT BLDCRRT: 37.5 SEC (ref 21–32)
APTT BLDCRRT: 37.7 SEC (ref 21–32)
APTT BLDCRRT: 37.9 SEC (ref 21–32)
APTT BLDCRRT: 38.6 SEC (ref 21–32)
APTT BLDCRRT: 38.9 SEC (ref 21–32)
APTT BLDCRRT: 39.1 SEC (ref 21–32)
APTT BLDCRRT: 39.9 SEC (ref 21–32)
APTT BLDCRRT: 40 SEC (ref 21–32)
APTT BLDCRRT: 40.3 SEC (ref 21–32)
APTT BLDCRRT: 43.1 SEC (ref 21–32)
APTT BLDCRRT: 44.4 SEC (ref 21–32)
ASCENDING AORTA: 3.5 CM
AST SERPL-CCNC: 13 U/L (ref 10–40)
AST SERPL-CCNC: 17 U/L (ref 10–40)
AST SERPL-CCNC: 17 U/L (ref 10–40)
AST SERPL-CCNC: 19 U/L (ref 10–40)
AST SERPL-CCNC: 20 U/L (ref 10–40)
AST SERPL-CCNC: 23 U/L (ref 10–40)
AST SERPL-CCNC: 25 U/L (ref 10–40)
AST SERPL-CCNC: 33 U/L (ref 10–40)
AST SERPL-CCNC: 47 U/L (ref 10–40)
BACTERIA #/AREA URNS AUTO: NORMAL /HPF
BACTERIA BLD CULT: ABNORMAL
BACTERIA BLD CULT: NORMAL
BACTERIA SPEC AEROBE CULT: ABNORMAL
BACTERIA SPEC ANAEROBE CULT: NORMAL
BASOPHILS # BLD AUTO: 0.02 K/UL (ref 0–0.2)
BASOPHILS # BLD AUTO: 0.03 K/UL (ref 0–0.2)
BASOPHILS # BLD AUTO: 0.04 K/UL (ref 0–0.2)
BASOPHILS # BLD AUTO: 0.05 K/UL (ref 0–0.2)
BASOPHILS # BLD AUTO: 0.06 K/UL (ref 0–0.2)
BASOPHILS # BLD AUTO: 0.06 K/UL (ref 0–0.2)
BASOPHILS # BLD AUTO: ABNORMAL K/UL (ref 0–0.2)
BASOPHILS NFR BLD: 0 % (ref 0–1.9)
BASOPHILS NFR BLD: 0.1 % (ref 0–1.9)
BASOPHILS NFR BLD: 0.2 % (ref 0–1.9)
BASOPHILS NFR BLD: 0.3 % (ref 0–1.9)
BASOPHILS NFR BLD: 0.4 % (ref 0–1.9)
BILIRUB DIRECT SERPL-MCNC: 0.3 MG/DL (ref 0.1–0.3)
BILIRUB DIRECT SERPL-MCNC: 0.4 MG/DL (ref 0.1–0.3)
BILIRUB DIRECT SERPL-MCNC: 0.6 MG/DL (ref 0.1–0.3)
BILIRUB SERPL-MCNC: 0.6 MG/DL (ref 0.1–1)
BILIRUB SERPL-MCNC: 0.8 MG/DL (ref 0.1–1)
BILIRUB SERPL-MCNC: 0.8 MG/DL (ref 0.1–1)
BILIRUB SERPL-MCNC: 0.9 MG/DL (ref 0.1–1)
BILIRUB SERPL-MCNC: 1.2 MG/DL (ref 0.1–1)
BILIRUB SERPL-MCNC: 1.7 MG/DL (ref 0.1–1)
BILIRUB UR QL STRIP: NEGATIVE
BILIRUB UR QL STRIP: NEGATIVE
BLD GP AB SCN CELLS X3 SERPL QL: NORMAL
BLD PROD TYP BPU: NORMAL
BLD PROD TYP BPU: NORMAL
BLOOD UNIT EXPIRATION DATE: NORMAL
BLOOD UNIT EXPIRATION DATE: NORMAL
BLOOD UNIT TYPE CODE: 5100
BLOOD UNIT TYPE CODE: 5100
BLOOD UNIT TYPE: NORMAL
BLOOD UNIT TYPE: NORMAL
BNP SERPL-MCNC: 1483 PG/ML (ref 0–99)
BNP SERPL-MCNC: 369 PG/ML (ref 0–99)
BNP SERPL-MCNC: 381 PG/ML (ref 0–99)
BNP SERPL-MCNC: 400 PG/ML (ref 0–99)
BNP SERPL-MCNC: 488 PG/ML (ref 0–99)
BNP SERPL-MCNC: 602 PG/ML (ref 0–99)
BNP SERPL-MCNC: 615 PG/ML (ref 0–99)
BNP SERPL-MCNC: 626 PG/ML (ref 0–99)
BNP SERPL-MCNC: 637 PG/ML (ref 0–99)
BNP SERPL-MCNC: 657 PG/ML (ref 0–99)
BORDETELLA PARAPERTUSSIS (IS1001): NOT DETECTED
BORDETELLA PERTUSSIS (PTXP): NOT DETECTED
BSA FOR ECHO PROCEDURE: 2.05 M2
BUN SERPL-MCNC: 10 MG/DL (ref 6–20)
BUN SERPL-MCNC: 11 MG/DL (ref 6–20)
BUN SERPL-MCNC: 12 MG/DL (ref 6–20)
BUN SERPL-MCNC: 12 MG/DL (ref 6–20)
BUN SERPL-MCNC: 13 MG/DL (ref 6–20)
BUN SERPL-MCNC: 14 MG/DL (ref 6–20)
BUN SERPL-MCNC: 17 MG/DL (ref 6–20)
BUN SERPL-MCNC: 21 MG/DL (ref 6–20)
BUN SERPL-MCNC: 5 MG/DL (ref 6–20)
BUN SERPL-MCNC: 6 MG/DL (ref 6–20)
BUN SERPL-MCNC: 7 MG/DL (ref 6–20)
BUN SERPL-MCNC: 7 MG/DL (ref 6–20)
BUN SERPL-MCNC: 8 MG/DL (ref 6–20)
BUN SERPL-MCNC: 8 MG/DL (ref 6–20)
BUN SERPL-MCNC: 9 MG/DL (ref 6–20)
BURR CELLS BLD QL SMEAR: ABNORMAL
C DIFF GDH STL QL: NEGATIVE
C DIFF TOX A+B STL QL IA: NEGATIVE
CALCIUM SERPL-MCNC: 7.9 MG/DL (ref 8.7–10.5)
CALCIUM SERPL-MCNC: 8 MG/DL (ref 8.7–10.5)
CALCIUM SERPL-MCNC: 8.1 MG/DL (ref 8.7–10.5)
CALCIUM SERPL-MCNC: 8.1 MG/DL (ref 8.7–10.5)
CALCIUM SERPL-MCNC: 8.2 MG/DL (ref 8.7–10.5)
CALCIUM SERPL-MCNC: 8.2 MG/DL (ref 8.7–10.5)
CALCIUM SERPL-MCNC: 8.3 MG/DL (ref 8.7–10.5)
CALCIUM SERPL-MCNC: 8.4 MG/DL (ref 8.7–10.5)
CALCIUM SERPL-MCNC: 8.5 MG/DL (ref 8.7–10.5)
CALCIUM SERPL-MCNC: 8.6 MG/DL (ref 8.7–10.5)
CALCIUM SERPL-MCNC: 8.6 MG/DL (ref 8.7–10.5)
CALCIUM SERPL-MCNC: 9 MG/DL (ref 8.7–10.5)
CALCIUM SERPL-MCNC: 9.2 MG/DL (ref 8.7–10.5)
CHLAMYDIA PNEUMONIAE: NOT DETECTED
CHLORIDE SERPL-SCNC: 100 MMOL/L (ref 95–110)
CHLORIDE SERPL-SCNC: 101 MMOL/L (ref 95–110)
CHLORIDE SERPL-SCNC: 102 MMOL/L (ref 95–110)
CHLORIDE SERPL-SCNC: 103 MMOL/L (ref 95–110)
CHLORIDE SERPL-SCNC: 103 MMOL/L (ref 95–110)
CHLORIDE SERPL-SCNC: 104 MMOL/L (ref 95–110)
CHLORIDE SERPL-SCNC: 105 MMOL/L (ref 95–110)
CHLORIDE SERPL-SCNC: 106 MMOL/L (ref 95–110)
CHLORIDE SERPL-SCNC: 86 MMOL/L (ref 95–110)
CHLORIDE SERPL-SCNC: 89 MMOL/L (ref 95–110)
CHLORIDE SERPL-SCNC: 92 MMOL/L (ref 95–110)
CHLORIDE SERPL-SCNC: 92 MMOL/L (ref 95–110)
CHLORIDE SERPL-SCNC: 94 MMOL/L (ref 95–110)
CHLORIDE SERPL-SCNC: 97 MMOL/L (ref 95–110)
CK SERPL-CCNC: 37 U/L (ref 20–200)
CK SERPL-CCNC: 371 U/L (ref 20–200)
CK SERPL-CCNC: 40 U/L (ref 20–200)
CK SERPL-CCNC: 76 U/L (ref 20–200)
CLARITY UR REFRACT.AUTO: CLEAR
CLARITY UR REFRACT.AUTO: CLEAR
CO2 SERPL-SCNC: 17 MMOL/L (ref 23–29)
CO2 SERPL-SCNC: 18 MMOL/L (ref 23–29)
CO2 SERPL-SCNC: 18 MMOL/L (ref 23–29)
CO2 SERPL-SCNC: 21 MMOL/L (ref 23–29)
CO2 SERPL-SCNC: 21 MMOL/L (ref 23–29)
CO2 SERPL-SCNC: 22 MMOL/L (ref 23–29)
CO2 SERPL-SCNC: 23 MMOL/L (ref 23–29)
CO2 SERPL-SCNC: 24 MMOL/L (ref 23–29)
CO2 SERPL-SCNC: 24 MMOL/L (ref 23–29)
CO2 SERPL-SCNC: 25 MMOL/L (ref 23–29)
CO2 SERPL-SCNC: 27 MMOL/L (ref 23–29)
CO2 SERPL-SCNC: 29 MMOL/L (ref 23–29)
CO2 SERPL-SCNC: 29 MMOL/L (ref 23–29)
CO2 SERPL-SCNC: 32 MMOL/L (ref 23–29)
CO2 SERPL-SCNC: 32 MMOL/L (ref 23–29)
CO2 SERPL-SCNC: 33 MMOL/L (ref 23–29)
CO2 SERPL-SCNC: 9 MMOL/L (ref 23–29)
CODING SYSTEM: NORMAL
CODING SYSTEM: NORMAL
COLOR UR AUTO: YELLOW
COLOR UR AUTO: YELLOW
CORONAVIRUS 229E, COMMON COLD VIRUS: NOT DETECTED
CORONAVIRUS HKU1, COMMON COLD VIRUS: NOT DETECTED
CORONAVIRUS NL63, COMMON COLD VIRUS: NOT DETECTED
CORONAVIRUS OC43, COMMON COLD VIRUS: NOT DETECTED
CREAT SERPL-MCNC: 0.8 MG/DL (ref 0.5–1.4)
CREAT SERPL-MCNC: 0.9 MG/DL (ref 0.5–1.4)
CREAT SERPL-MCNC: 1 MG/DL (ref 0.5–1.4)
CREAT SERPL-MCNC: 1 MG/DL (ref 0.5–1.4)
CREAT SERPL-MCNC: 1.1 MG/DL (ref 0.5–1.4)
CREAT SERPL-MCNC: 1.1 MG/DL (ref 0.5–1.4)
CREAT SERPL-MCNC: 1.2 MG/DL (ref 0.5–1.4)
CREAT SERPL-MCNC: 1.2 MG/DL (ref 0.5–1.4)
CREAT SERPL-MCNC: 1.3 MG/DL (ref 0.5–1.4)
CREAT SERPL-MCNC: 1.3 MG/DL (ref 0.5–1.4)
CREAT SERPL-MCNC: 1.4 MG/DL (ref 0.5–1.4)
CREAT SERPL-MCNC: 1.5 MG/DL (ref 0.5–1.4)
CREAT SERPL-MCNC: 1.8 MG/DL (ref 0.5–1.4)
CRP SERPL-MCNC: 106.2 MG/L (ref 0–8.2)
CRP SERPL-MCNC: 108 MG/L (ref 0–8.2)
CRP SERPL-MCNC: 132.5 MG/L (ref 0–8.2)
CRP SERPL-MCNC: 133.2 MG/L (ref 0–8.2)
CRP SERPL-MCNC: 144 MG/L (ref 0–8.2)
CRP SERPL-MCNC: 144 MG/L (ref 0–8.2)
CRP SERPL-MCNC: 196.4 MG/L (ref 0–8.2)
CRP SERPL-MCNC: 85.8 MG/L (ref 0–8.2)
CRP SERPL-MCNC: 96 MG/L (ref 0–8.2)
CV ECHO LV RWT: 0.28 CM
DACRYOCYTES BLD QL SMEAR: ABNORMAL
DELSYS: ABNORMAL
DIFFERENTIAL METHOD: ABNORMAL
DISPENSE STATUS: NORMAL
DISPENSE STATUS: NORMAL
DOP CALC LVOT AREA: 3.1 CM2
DOP CALC LVOT DIAMETER: 1.98 CM
DOP CALC MV VTI: 27.8 CM
DOP CALC RVOT PEAK VEL: 0.52 M/S
DOP CALC RVOT VTI: 5.62 CM
E WAVE DECELERATION TIME: 256.94 MSEC
E/A RATIO: 1.36
E/E' RATIO: 23.33 M/S
ECHO LV POSTERIOR WALL: 0.93 CM (ref 0.6–1.1)
EJECTION FRACTION: 25 %
EOSINOPHIL # BLD AUTO: 0 K/UL (ref 0–0.5)
EOSINOPHIL # BLD AUTO: 0 K/UL (ref 0–0.5)
EOSINOPHIL # BLD AUTO: 0.1 K/UL (ref 0–0.5)
EOSINOPHIL # BLD AUTO: 0.1 K/UL (ref 0–0.5)
EOSINOPHIL # BLD AUTO: 0.2 K/UL (ref 0–0.5)
EOSINOPHIL # BLD AUTO: 0.3 K/UL (ref 0–0.5)
EOSINOPHIL # BLD AUTO: 0.4 K/UL (ref 0–0.5)
EOSINOPHIL # BLD AUTO: ABNORMAL K/UL (ref 0–0.5)
EOSINOPHIL NFR BLD: 0 % (ref 0–8)
EOSINOPHIL NFR BLD: 0.1 % (ref 0–8)
EOSINOPHIL NFR BLD: 0.2 % (ref 0–8)
EOSINOPHIL NFR BLD: 0.5 % (ref 0–8)
EOSINOPHIL NFR BLD: 0.8 % (ref 0–8)
EOSINOPHIL NFR BLD: 1 % (ref 0–8)
EOSINOPHIL NFR BLD: 1 % (ref 0–8)
EOSINOPHIL NFR BLD: 1.2 % (ref 0–8)
EOSINOPHIL NFR BLD: 1.4 % (ref 0–8)
EOSINOPHIL NFR BLD: 1.5 % (ref 0–8)
EOSINOPHIL NFR BLD: 1.6 % (ref 0–8)
EOSINOPHIL NFR BLD: 1.6 % (ref 0–8)
EOSINOPHIL NFR BLD: 1.7 % (ref 0–8)
EOSINOPHIL NFR BLD: 1.7 % (ref 0–8)
EOSINOPHIL NFR BLD: 1.9 % (ref 0–8)
EOSINOPHIL NFR BLD: 2.1 % (ref 0–8)
EOSINOPHIL NFR BLD: 2.3 % (ref 0–8)
EOSINOPHIL NFR BLD: 2.4 % (ref 0–8)
EOSINOPHIL NFR BLD: 2.5 % (ref 0–8)
EOSINOPHIL NFR BLD: 2.6 % (ref 0–8)
EOSINOPHIL NFR BLD: 2.7 % (ref 0–8)
EOSINOPHIL NFR BLD: 2.9 % (ref 0–8)
EOSINOPHIL NFR BLD: 3 % (ref 0–8)
ERYTHROCYTE [DISTWIDTH] IN BLOOD BY AUTOMATED COUNT: 23.2 % (ref 11.5–14.5)
ERYTHROCYTE [DISTWIDTH] IN BLOOD BY AUTOMATED COUNT: 23.2 % (ref 11.5–14.5)
ERYTHROCYTE [DISTWIDTH] IN BLOOD BY AUTOMATED COUNT: 23.3 % (ref 11.5–14.5)
ERYTHROCYTE [DISTWIDTH] IN BLOOD BY AUTOMATED COUNT: 23.4 % (ref 11.5–14.5)
ERYTHROCYTE [DISTWIDTH] IN BLOOD BY AUTOMATED COUNT: 23.5 % (ref 11.5–14.5)
ERYTHROCYTE [DISTWIDTH] IN BLOOD BY AUTOMATED COUNT: 23.7 % (ref 11.5–14.5)
ERYTHROCYTE [DISTWIDTH] IN BLOOD BY AUTOMATED COUNT: 23.8 % (ref 11.5–14.5)
ERYTHROCYTE [DISTWIDTH] IN BLOOD BY AUTOMATED COUNT: 23.9 % (ref 11.5–14.5)
ERYTHROCYTE [DISTWIDTH] IN BLOOD BY AUTOMATED COUNT: 23.9 % (ref 11.5–14.5)
ERYTHROCYTE [DISTWIDTH] IN BLOOD BY AUTOMATED COUNT: 24 % (ref 11.5–14.5)
ERYTHROCYTE [DISTWIDTH] IN BLOOD BY AUTOMATED COUNT: 24 % (ref 11.5–14.5)
ERYTHROCYTE [DISTWIDTH] IN BLOOD BY AUTOMATED COUNT: 24.1 % (ref 11.5–14.5)
ERYTHROCYTE [DISTWIDTH] IN BLOOD BY AUTOMATED COUNT: 24.1 % (ref 11.5–14.5)
ERYTHROCYTE [DISTWIDTH] IN BLOOD BY AUTOMATED COUNT: 24.4 % (ref 11.5–14.5)
ERYTHROCYTE [DISTWIDTH] IN BLOOD BY AUTOMATED COUNT: 24.4 % (ref 11.5–14.5)
ERYTHROCYTE [DISTWIDTH] IN BLOOD BY AUTOMATED COUNT: 24.8 % (ref 11.5–14.5)
ERYTHROCYTE [DISTWIDTH] IN BLOOD BY AUTOMATED COUNT: 26.5 % (ref 11.5–14.5)
ERYTHROCYTE [DISTWIDTH] IN BLOOD BY AUTOMATED COUNT: 26.6 % (ref 11.5–14.5)
ERYTHROCYTE [DISTWIDTH] IN BLOOD BY AUTOMATED COUNT: 27.6 % (ref 11.5–14.5)
ERYTHROCYTE [DISTWIDTH] IN BLOOD BY AUTOMATED COUNT: 30.1 % (ref 11.5–14.5)
ERYTHROCYTE [DISTWIDTH] IN BLOOD BY AUTOMATED COUNT: 33.8 % (ref 11.5–14.5)
ERYTHROCYTE [SEDIMENTATION RATE] IN BLOOD BY WESTERGREN METHOD: 20 MM/H
EST. GFR  (AFRICAN AMERICAN): 47.2 ML/MIN/1.73 M^2
EST. GFR  (AFRICAN AMERICAN): 58.9 ML/MIN/1.73 M^2
EST. GFR  (AFRICAN AMERICAN): >60 ML/MIN/1.73 M^2
EST. GFR  (NON AFRICAN AMERICAN): 40.9 ML/MIN/1.73 M^2
EST. GFR  (NON AFRICAN AMERICAN): 50.9 ML/MIN/1.73 M^2
EST. GFR  (NON AFRICAN AMERICAN): 55.4 ML/MIN/1.73 M^2
EST. GFR  (NON AFRICAN AMERICAN): >60 ML/MIN/1.73 M^2
FIO2: 60
FLUBV RNA NPH QL NAA+NON-PROBE: NOT DETECTED
FRACTIONAL SHORTENING: 5 % (ref 28–44)
FUNGITELL COMMENTS: NEGATIVE
GLUCOSE SERPL-MCNC: 100 MG/DL (ref 70–110)
GLUCOSE SERPL-MCNC: 101 MG/DL (ref 70–110)
GLUCOSE SERPL-MCNC: 101 MG/DL (ref 70–110)
GLUCOSE SERPL-MCNC: 105 MG/DL (ref 70–110)
GLUCOSE SERPL-MCNC: 109 MG/DL (ref 70–110)
GLUCOSE SERPL-MCNC: 113 MG/DL (ref 70–110)
GLUCOSE SERPL-MCNC: 115 MG/DL (ref 70–110)
GLUCOSE SERPL-MCNC: 130 MG/DL (ref 70–110)
GLUCOSE SERPL-MCNC: 134 MG/DL (ref 70–110)
GLUCOSE SERPL-MCNC: 196 MG/DL (ref 70–110)
GLUCOSE SERPL-MCNC: 51 MG/DL (ref 70–110)
GLUCOSE SERPL-MCNC: 59 MG/DL (ref 70–110)
GLUCOSE SERPL-MCNC: 70 MG/DL (ref 70–110)
GLUCOSE SERPL-MCNC: 72 MG/DL (ref 70–110)
GLUCOSE SERPL-MCNC: 73 MG/DL (ref 70–110)
GLUCOSE SERPL-MCNC: 78 MG/DL (ref 70–110)
GLUCOSE SERPL-MCNC: 83 MG/DL (ref 70–110)
GLUCOSE SERPL-MCNC: 90 MG/DL (ref 70–110)
GLUCOSE SERPL-MCNC: 90 MG/DL (ref 70–110)
GLUCOSE SERPL-MCNC: 92 MG/DL (ref 70–110)
GLUCOSE SERPL-MCNC: 94 MG/DL (ref 70–110)
GLUCOSE SERPL-MCNC: 96 MG/DL (ref 70–110)
GLUCOSE SERPL-MCNC: 96 MG/DL (ref 70–110)
GLUCOSE SERPL-MCNC: 98 MG/DL (ref 70–110)
GLUCOSE SERPL-MCNC: 99 MG/DL (ref 70–110)
GLUCOSE UR QL STRIP: NEGATIVE
GLUCOSE UR QL STRIP: NEGATIVE
HCO3 UR-SCNC: 10.5 MMOL/L (ref 24–28)
HCO3 UR-SCNC: 11.8 MMOL/L (ref 24–28)
HCO3 UR-SCNC: 13 MMOL/L (ref 24–28)
HCO3 UR-SCNC: 19.6 MMOL/L (ref 24–28)
HCT VFR BLD AUTO: 22.4 % (ref 40–54)
HCT VFR BLD AUTO: 23.1 % (ref 40–54)
HCT VFR BLD AUTO: 24.2 % (ref 40–54)
HCT VFR BLD AUTO: 24.5 % (ref 40–54)
HCT VFR BLD AUTO: 24.8 % (ref 40–54)
HCT VFR BLD AUTO: 25.3 % (ref 40–54)
HCT VFR BLD AUTO: 25.6 % (ref 40–54)
HCT VFR BLD AUTO: 25.7 % (ref 40–54)
HCT VFR BLD AUTO: 25.9 % (ref 40–54)
HCT VFR BLD AUTO: 26.8 % (ref 40–54)
HCT VFR BLD AUTO: 27.4 % (ref 40–54)
HCT VFR BLD AUTO: 27.9 % (ref 40–54)
HCT VFR BLD AUTO: 28.2 % (ref 40–54)
HCT VFR BLD AUTO: 28.5 % (ref 40–54)
HCT VFR BLD AUTO: 28.6 % (ref 40–54)
HCT VFR BLD AUTO: 30.2 % (ref 40–54)
HCT VFR BLD AUTO: 30.7 % (ref 40–54)
HCT VFR BLD AUTO: 31.1 % (ref 40–54)
HCT VFR BLD AUTO: 31.2 % (ref 40–54)
HCT VFR BLD AUTO: 32.5 % (ref 40–54)
HGB BLD-MCNC: 6.8 G/DL (ref 14–18)
HGB BLD-MCNC: 6.9 G/DL (ref 14–18)
HGB BLD-MCNC: 7 G/DL (ref 14–18)
HGB BLD-MCNC: 7 G/DL (ref 14–18)
HGB BLD-MCNC: 7.1 G/DL (ref 14–18)
HGB BLD-MCNC: 7.2 G/DL (ref 14–18)
HGB BLD-MCNC: 7.2 G/DL (ref 14–18)
HGB BLD-MCNC: 7.3 G/DL (ref 14–18)
HGB BLD-MCNC: 7.4 G/DL (ref 14–18)
HGB BLD-MCNC: 7.5 G/DL (ref 14–18)
HGB BLD-MCNC: 7.5 G/DL (ref 14–18)
HGB BLD-MCNC: 7.7 G/DL (ref 14–18)
HGB BLD-MCNC: 8 G/DL (ref 14–18)
HGB BLD-MCNC: 8 G/DL (ref 14–18)
HGB BLD-MCNC: 8.2 G/DL (ref 14–18)
HGB BLD-MCNC: 8.2 G/DL (ref 14–18)
HGB BLD-MCNC: 8.5 G/DL (ref 14–18)
HGB BLD-MCNC: 8.8 G/DL (ref 14–18)
HGB BLD-MCNC: 8.9 G/DL (ref 14–18)
HGB BLD-MCNC: 9 G/DL (ref 14–18)
HGB BLD-MCNC: 9.6 G/DL (ref 14–18)
HGB UR QL STRIP: ABNORMAL
HGB UR QL STRIP: ABNORMAL
HPIV1 RNA NPH QL NAA+NON-PROBE: NOT DETECTED
HPIV2 RNA NPH QL NAA+NON-PROBE: NOT DETECTED
HPIV3 RNA NPH QL NAA+NON-PROBE: NOT DETECTED
HPIV4 RNA NPH QL NAA+NON-PROBE: NOT DETECTED
HR MV ECHO: 117 BPM
HUMAN METAPNEUMOVIRUS: NOT DETECTED
HYALINE CASTS UR QL AUTO: 0 /LPF
HYPOCHROMIA BLD QL SMEAR: ABNORMAL
IMM GRANULOCYTES # BLD AUTO: 0.05 K/UL (ref 0–0.04)
IMM GRANULOCYTES # BLD AUTO: 0.06 K/UL (ref 0–0.04)
IMM GRANULOCYTES # BLD AUTO: 0.07 K/UL (ref 0–0.04)
IMM GRANULOCYTES # BLD AUTO: 0.08 K/UL (ref 0–0.04)
IMM GRANULOCYTES # BLD AUTO: 0.08 K/UL (ref 0–0.04)
IMM GRANULOCYTES # BLD AUTO: 0.09 K/UL (ref 0–0.04)
IMM GRANULOCYTES # BLD AUTO: 0.1 K/UL (ref 0–0.04)
IMM GRANULOCYTES # BLD AUTO: 0.11 K/UL (ref 0–0.04)
IMM GRANULOCYTES # BLD AUTO: 0.14 K/UL (ref 0–0.04)
IMM GRANULOCYTES # BLD AUTO: 0.17 K/UL (ref 0–0.04)
IMM GRANULOCYTES # BLD AUTO: 0.17 K/UL (ref 0–0.04)
IMM GRANULOCYTES # BLD AUTO: 0.18 K/UL (ref 0–0.04)
IMM GRANULOCYTES # BLD AUTO: 0.19 K/UL (ref 0–0.04)
IMM GRANULOCYTES # BLD AUTO: 0.26 K/UL (ref 0–0.04)
IMM GRANULOCYTES # BLD AUTO: 0.28 K/UL (ref 0–0.04)
IMM GRANULOCYTES # BLD AUTO: 0.38 K/UL (ref 0–0.04)
IMM GRANULOCYTES # BLD AUTO: ABNORMAL K/UL (ref 0–0.04)
IMM GRANULOCYTES NFR BLD AUTO: 0.4 % (ref 0–0.5)
IMM GRANULOCYTES NFR BLD AUTO: 0.4 % (ref 0–0.5)
IMM GRANULOCYTES NFR BLD AUTO: 0.5 % (ref 0–0.5)
IMM GRANULOCYTES NFR BLD AUTO: 0.6 % (ref 0–0.5)
IMM GRANULOCYTES NFR BLD AUTO: 0.7 % (ref 0–0.5)
IMM GRANULOCYTES NFR BLD AUTO: 0.8 % (ref 0–0.5)
IMM GRANULOCYTES NFR BLD AUTO: 0.9 % (ref 0–0.5)
IMM GRANULOCYTES NFR BLD AUTO: 1 % (ref 0–0.5)
IMM GRANULOCYTES NFR BLD AUTO: 1.1 % (ref 0–0.5)
IMM GRANULOCYTES NFR BLD AUTO: 1.1 % (ref 0–0.5)
IMM GRANULOCYTES NFR BLD AUTO: 1.2 % (ref 0–0.5)
IMM GRANULOCYTES NFR BLD AUTO: 2.1 % (ref 0–0.5)
IMM GRANULOCYTES NFR BLD AUTO: ABNORMAL % (ref 0–0.5)
INFLUENZA A (SUBTYPES H1,H1-2009,H3): NOT DETECTED
INR PPP: 1.4 (ref 0.8–1.2)
INR PPP: 1.45
INR PPP: 1.46
INR PPP: 1.5
INR PPP: 1.5 (ref 0.8–1.2)
INR PPP: 1.58
INR PPP: 1.6 (ref 0.8–1.2)
INR PPP: 1.6 (ref 0.8–1.2)
INR PPP: 1.67
INR PPP: 1.79
INR PPP: 1.9 (ref 0.8–1.2)
INR PPP: 1.9 (ref 0.8–1.2)
INR PPP: 2.1 (ref 0.8–1.2)
INR PPP: 2.3 (ref 0.8–1.2)
INR PPP: 2.4 (ref 0.8–1.2)
INR PPP: 2.5 (ref 0.8–1.2)
INR PPP: 2.6 (ref 0.8–1.2)
INR PPP: 2.6 (ref 0.8–1.2)
INR PPP: 3 (ref 0.8–1.2)
INR PPP: 3 (ref 0.8–1.2)
INR PPP: 3.6 (ref 0.8–1.2)
INTERVENTRICULAR SEPTUM: 0.76 CM (ref 0.6–1.1)
KETONES UR QL STRIP: ABNORMAL
KETONES UR QL STRIP: NEGATIVE
LA MAJOR: 5.74 CM
LA MINOR: 5.65 CM
LA WIDTH: 4.91 CM
LACTATE SERPL-SCNC: 1.6 MMOL/L (ref 0.5–2.2)
LACTATE SERPL-SCNC: 7.6 MMOL/L (ref 0.5–2.2)
LACTATE SERPL-SCNC: >12 MMOL/L (ref 0.5–2.2)
LDH SERPL L TO P-CCNC: 217 U/L (ref 110–260)
LDH SERPL L TO P-CCNC: 229 U/L (ref 110–260)
LDH SERPL L TO P-CCNC: 231 U/L (ref 110–260)
LDH SERPL L TO P-CCNC: 240 U/L (ref 110–260)
LDH SERPL L TO P-CCNC: 254 U/L (ref 110–260)
LDH SERPL L TO P-CCNC: 259 U/L (ref 110–260)
LDH SERPL L TO P-CCNC: 266 U/L (ref 110–260)
LDH SERPL L TO P-CCNC: 285 U/L (ref 110–260)
LDH SERPL L TO P-CCNC: 291 U/L (ref 110–260)
LDH SERPL L TO P-CCNC: 293 U/L (ref 110–260)
LDH SERPL L TO P-CCNC: 294 U/L (ref 110–260)
LDH SERPL L TO P-CCNC: 296 U/L (ref 110–260)
LDH SERPL L TO P-CCNC: 298 U/L (ref 110–260)
LDH SERPL L TO P-CCNC: 314 U/L (ref 110–260)
LDH SERPL L TO P-CCNC: 314 U/L (ref 110–260)
LDH SERPL L TO P-CCNC: 318 U/L (ref 110–260)
LDH SERPL L TO P-CCNC: 360 U/L (ref 110–260)
LDH SERPL L TO P-CCNC: 378 U/L (ref 110–260)
LDH SERPL L TO P-CCNC: 418 U/L (ref 110–260)
LDH SERPL L TO P-CCNC: 421 U/L (ref 110–260)
LDH SERPL L TO P-CCNC: 436 U/L (ref 110–260)
LDH SERPL L TO P-CCNC: 477 U/L (ref 110–260)
LDH SERPL L TO P-CCNC: 506 U/L (ref 110–260)
LDH SERPL L TO P-CCNC: 525 U/L (ref 110–260)
LEFT ATRIUM SIZE: 4.67 CM
LEFT ATRIUM VOLUME INDEX MOD: 65.6 ML/M2
LEFT ATRIUM VOLUME INDEX: 55.5 ML/M2
LEFT ATRIUM VOLUME MOD: 131.22 CM3
LEFT ATRIUM VOLUME: 110.99 CM3
LEFT INTERNAL DIMENSION IN SYSTOLE: 6.25 CM (ref 2.1–4)
LEFT VENTRICLE DIASTOLIC VOLUME INDEX: 125.32 ML/M2
LEFT VENTRICLE DIASTOLIC VOLUME: 250.64 ML
LEFT VENTRICLE MASS INDEX: 118 G/M2
LEFT VENTRICLE SYSTOLIC VOLUME INDEX: 98.8 ML/M2
LEFT VENTRICLE SYSTOLIC VOLUME: 197.57 ML
LEFT VENTRICULAR INTERNAL DIMENSION IN DIASTOLE: 6.6 CM (ref 3.5–6)
LEFT VENTRICULAR MASS: 235.41 G
LEUKOCYTE ESTERASE UR QL STRIP: NEGATIVE
LEUKOCYTE ESTERASE UR QL STRIP: NEGATIVE
LV LATERAL E/E' RATIO: 20 M/S
LV SEPTAL E/E' RATIO: 28 M/S
LYMPHOCYTES # BLD AUTO: 0.6 K/UL (ref 1–4.8)
LYMPHOCYTES # BLD AUTO: 0.8 K/UL (ref 1–4.8)
LYMPHOCYTES # BLD AUTO: 0.9 K/UL (ref 1–4.8)
LYMPHOCYTES # BLD AUTO: 1 K/UL (ref 1–4.8)
LYMPHOCYTES # BLD AUTO: 1.1 K/UL (ref 1–4.8)
LYMPHOCYTES # BLD AUTO: 1.1 K/UL (ref 1–4.8)
LYMPHOCYTES # BLD AUTO: 1.2 K/UL (ref 1–4.8)
LYMPHOCYTES # BLD AUTO: 1.3 K/UL (ref 1–4.8)
LYMPHOCYTES # BLD AUTO: 1.3 K/UL (ref 1–4.8)
LYMPHOCYTES # BLD AUTO: 1.4 K/UL (ref 1–4.8)
LYMPHOCYTES # BLD AUTO: 1.5 K/UL (ref 1–4.8)
LYMPHOCYTES # BLD AUTO: 1.7 K/UL (ref 1–4.8)
LYMPHOCYTES # BLD AUTO: 1.8 K/UL (ref 1–4.8)
LYMPHOCYTES # BLD AUTO: 1.8 K/UL (ref 1–4.8)
LYMPHOCYTES # BLD AUTO: 1.9 K/UL (ref 1–4.8)
LYMPHOCYTES # BLD AUTO: 2.1 K/UL (ref 1–4.8)
LYMPHOCYTES # BLD AUTO: ABNORMAL K/UL (ref 1–4.8)
LYMPHOCYTES NFR BLD: 10.5 % (ref 18–48)
LYMPHOCYTES NFR BLD: 10.7 % (ref 18–48)
LYMPHOCYTES NFR BLD: 10.9 % (ref 18–48)
LYMPHOCYTES NFR BLD: 11.8 % (ref 18–48)
LYMPHOCYTES NFR BLD: 12.3 % (ref 18–48)
LYMPHOCYTES NFR BLD: 28 % (ref 18–48)
LYMPHOCYTES NFR BLD: 3.3 % (ref 18–48)
LYMPHOCYTES NFR BLD: 4.4 % (ref 18–48)
LYMPHOCYTES NFR BLD: 4.7 % (ref 18–48)
LYMPHOCYTES NFR BLD: 4.8 % (ref 18–48)
LYMPHOCYTES NFR BLD: 5.3 % (ref 18–48)
LYMPHOCYTES NFR BLD: 5.6 % (ref 18–48)
LYMPHOCYTES NFR BLD: 5.8 % (ref 18–48)
LYMPHOCYTES NFR BLD: 6.4 % (ref 18–48)
LYMPHOCYTES NFR BLD: 7.8 % (ref 18–48)
LYMPHOCYTES NFR BLD: 8.6 % (ref 18–48)
LYMPHOCYTES NFR BLD: 9.1 % (ref 18–48)
LYMPHOCYTES NFR BLD: 9.3 % (ref 18–48)
LYMPHOCYTES NFR BLD: 9.5 % (ref 18–48)
LYMPHOCYTES NFR BLD: 9.6 % (ref 18–48)
LYMPHOCYTES NFR BLD: 9.8 % (ref 18–48)
MAGNESIUM SERPL-MCNC: 1.4 MG/DL (ref 1.6–2.6)
MAGNESIUM SERPL-MCNC: 1.6 MG/DL (ref 1.6–2.6)
MAGNESIUM SERPL-MCNC: 1.7 MG/DL (ref 1.6–2.6)
MAGNESIUM SERPL-MCNC: 1.9 MG/DL (ref 1.6–2.6)
MAGNESIUM SERPL-MCNC: 1.9 MG/DL (ref 1.6–2.6)
MAGNESIUM SERPL-MCNC: 2 MG/DL (ref 1.6–2.6)
MAGNESIUM SERPL-MCNC: 2 MG/DL (ref 1.6–2.6)
MAGNESIUM SERPL-MCNC: 2.1 MG/DL (ref 1.6–2.6)
MAGNESIUM SERPL-MCNC: 2.1 MG/DL (ref 1.6–2.6)
MAGNESIUM SERPL-MCNC: 2.2 MG/DL (ref 1.6–2.6)
MAGNESIUM SERPL-MCNC: 2.3 MG/DL (ref 1.6–2.6)
MAGNESIUM SERPL-MCNC: 2.4 MG/DL (ref 1.6–2.6)
MAGNESIUM SERPL-MCNC: 2.4 MG/DL (ref 1.6–2.6)
MCH RBC QN AUTO: 17.1 PG (ref 27–31)
MCH RBC QN AUTO: 17.1 PG (ref 27–31)
MCH RBC QN AUTO: 17.3 PG (ref 27–31)
MCH RBC QN AUTO: 17.4 PG (ref 27–31)
MCH RBC QN AUTO: 17.4 PG (ref 27–31)
MCH RBC QN AUTO: 17.5 PG (ref 27–31)
MCH RBC QN AUTO: 17.6 PG (ref 27–31)
MCH RBC QN AUTO: 17.6 PG (ref 27–31)
MCH RBC QN AUTO: 17.7 PG (ref 27–31)
MCH RBC QN AUTO: 17.7 PG (ref 27–31)
MCH RBC QN AUTO: 17.8 PG (ref 27–31)
MCH RBC QN AUTO: 17.8 PG (ref 27–31)
MCH RBC QN AUTO: 17.9 PG (ref 27–31)
MCH RBC QN AUTO: 18.5 PG (ref 27–31)
MCH RBC QN AUTO: 19.2 PG (ref 27–31)
MCH RBC QN AUTO: 19.5 PG (ref 27–31)
MCH RBC QN AUTO: 20.1 PG (ref 27–31)
MCH RBC QN AUTO: 22.1 PG (ref 27–31)
MCH RBC QN AUTO: 26.9 PG (ref 27–31)
MCHC RBC AUTO-ENTMCNC: 26.9 G/DL (ref 32–36)
MCHC RBC AUTO-ENTMCNC: 28 G/DL (ref 32–36)
MCHC RBC AUTO-ENTMCNC: 28.1 G/DL (ref 32–36)
MCHC RBC AUTO-ENTMCNC: 28.1 G/DL (ref 32–36)
MCHC RBC AUTO-ENTMCNC: 28.4 G/DL (ref 32–36)
MCHC RBC AUTO-ENTMCNC: 28.5 G/DL (ref 32–36)
MCHC RBC AUTO-ENTMCNC: 28.5 G/DL (ref 32–36)
MCHC RBC AUTO-ENTMCNC: 28.6 G/DL (ref 32–36)
MCHC RBC AUTO-ENTMCNC: 28.7 G/DL (ref 32–36)
MCHC RBC AUTO-ENTMCNC: 28.8 G/DL (ref 32–36)
MCHC RBC AUTO-ENTMCNC: 29 G/DL (ref 32–36)
MCHC RBC AUTO-ENTMCNC: 29 G/DL (ref 32–36)
MCHC RBC AUTO-ENTMCNC: 29.4 G/DL (ref 32–36)
MCHC RBC AUTO-ENTMCNC: 29.4 G/DL (ref 32–36)
MCHC RBC AUTO-ENTMCNC: 29.5 G/DL (ref 32–36)
MCHC RBC AUTO-ENTMCNC: 29.6 G/DL (ref 32–36)
MCHC RBC AUTO-ENTMCNC: 29.7 G/DL (ref 32–36)
MCHC RBC AUTO-ENTMCNC: 30.6 G/DL (ref 32–36)
MCHC RBC AUTO-ENTMCNC: 34.4 G/DL (ref 32–36)
MCV RBC AUTO: 60 FL (ref 82–98)
MCV RBC AUTO: 61 FL (ref 82–98)
MCV RBC AUTO: 62 FL (ref 82–98)
MCV RBC AUTO: 63 FL (ref 82–98)
MCV RBC AUTO: 63 FL (ref 82–98)
MCV RBC AUTO: 65 FL (ref 82–98)
MCV RBC AUTO: 66 FL (ref 82–98)
MCV RBC AUTO: 68 FL (ref 82–98)
MCV RBC AUTO: 72 FL (ref 82–98)
MCV RBC AUTO: 72 FL (ref 82–98)
MCV RBC AUTO: 78 FL (ref 82–98)
MICROSCOPIC COMMENT: NORMAL
MICROSCOPIC COMMENT: NORMAL
MIN VOL: 12.6
MODE: ABNORMAL
MONOCYTES # BLD AUTO: 0.5 K/UL (ref 0.3–1)
MONOCYTES # BLD AUTO: 0.5 K/UL (ref 0.3–1)
MONOCYTES # BLD AUTO: 0.6 K/UL (ref 0.3–1)
MONOCYTES # BLD AUTO: 0.7 K/UL (ref 0.3–1)
MONOCYTES # BLD AUTO: 0.8 K/UL (ref 0.3–1)
MONOCYTES # BLD AUTO: 0.8 K/UL (ref 0.3–1)
MONOCYTES # BLD AUTO: 0.9 K/UL (ref 0.3–1)
MONOCYTES # BLD AUTO: 1 K/UL (ref 0.3–1)
MONOCYTES # BLD AUTO: 1.1 K/UL (ref 0.3–1)
MONOCYTES # BLD AUTO: 1.4 K/UL (ref 0.3–1)
MONOCYTES # BLD AUTO: ABNORMAL K/UL (ref 0.3–1)
MONOCYTES NFR BLD: 2 % (ref 4–15)
MONOCYTES NFR BLD: 2.6 % (ref 4–15)
MONOCYTES NFR BLD: 3.4 % (ref 4–15)
MONOCYTES NFR BLD: 3.4 % (ref 4–15)
MONOCYTES NFR BLD: 3.5 % (ref 4–15)
MONOCYTES NFR BLD: 3.8 % (ref 4–15)
MONOCYTES NFR BLD: 3.8 % (ref 4–15)
MONOCYTES NFR BLD: 3.9 % (ref 4–15)
MONOCYTES NFR BLD: 3.9 % (ref 4–15)
MONOCYTES NFR BLD: 4 % (ref 4–15)
MONOCYTES NFR BLD: 4.1 % (ref 4–15)
MONOCYTES NFR BLD: 4.1 % (ref 4–15)
MONOCYTES NFR BLD: 4.3 % (ref 4–15)
MONOCYTES NFR BLD: 4.5 % (ref 4–15)
MONOCYTES NFR BLD: 4.6 % (ref 4–15)
MONOCYTES NFR BLD: 4.9 % (ref 4–15)
MONOCYTES NFR BLD: 5.3 % (ref 4–15)
MONOCYTES NFR BLD: 5.4 % (ref 4–15)
MONOCYTES NFR BLD: 6.4 % (ref 4–15)
MV MEAN GRADIENT: 6 MMHG
MV PEAK A VEL: 1.03 M/S
MV PEAK E VEL: 1.4 M/S
MV PEAK GRADIENT: 11 MMHG
MV STENOSIS PRESSURE HALF TIME: 74.51 MS
MV VALVE AREA P 1/2 METHOD: 2.95 CM2
MYCOPLASMA PNEUMONIAE: NOT DETECTED
NEUTROPHILS # BLD AUTO: 10.3 K/UL (ref 1.8–7.7)
NEUTROPHILS # BLD AUTO: 10.9 K/UL (ref 1.8–7.7)
NEUTROPHILS # BLD AUTO: 11 K/UL (ref 1.8–7.7)
NEUTROPHILS # BLD AUTO: 11.3 K/UL (ref 1.8–7.7)
NEUTROPHILS # BLD AUTO: 11.4 K/UL (ref 1.8–7.7)
NEUTROPHILS # BLD AUTO: 11.6 K/UL (ref 1.8–7.7)
NEUTROPHILS # BLD AUTO: 11.7 K/UL (ref 1.8–7.7)
NEUTROPHILS # BLD AUTO: 11.8 K/UL (ref 1.8–7.7)
NEUTROPHILS # BLD AUTO: 12.4 K/UL (ref 1.8–7.7)
NEUTROPHILS # BLD AUTO: 12.9 K/UL (ref 1.8–7.7)
NEUTROPHILS # BLD AUTO: 12.9 K/UL (ref 1.8–7.7)
NEUTROPHILS # BLD AUTO: 13.3 K/UL (ref 1.8–7.7)
NEUTROPHILS # BLD AUTO: 14.3 K/UL (ref 1.8–7.7)
NEUTROPHILS # BLD AUTO: 15.5 K/UL (ref 1.8–7.7)
NEUTROPHILS # BLD AUTO: 16.5 K/UL (ref 1.8–7.7)
NEUTROPHILS # BLD AUTO: 17 K/UL (ref 1.8–7.7)
NEUTROPHILS # BLD AUTO: 17.2 K/UL (ref 1.8–7.7)
NEUTROPHILS # BLD AUTO: 17.5 K/UL (ref 1.8–7.7)
NEUTROPHILS # BLD AUTO: 18 K/UL (ref 1.8–7.7)
NEUTROPHILS # BLD AUTO: 18.2 K/UL (ref 1.8–7.7)
NEUTROPHILS # BLD AUTO: 18.4 K/UL (ref 1.8–7.7)
NEUTROPHILS # BLD AUTO: 23.7 K/UL (ref 1.8–7.7)
NEUTROPHILS NFR BLD: 67 % (ref 38–73)
NEUTROPHILS NFR BLD: 79.9 % (ref 38–73)
NEUTROPHILS NFR BLD: 80.2 % (ref 38–73)
NEUTROPHILS NFR BLD: 80.7 % (ref 38–73)
NEUTROPHILS NFR BLD: 80.9 % (ref 38–73)
NEUTROPHILS NFR BLD: 80.9 % (ref 38–73)
NEUTROPHILS NFR BLD: 81.4 % (ref 38–73)
NEUTROPHILS NFR BLD: 81.9 % (ref 38–73)
NEUTROPHILS NFR BLD: 83.3 % (ref 38–73)
NEUTROPHILS NFR BLD: 83.5 % (ref 38–73)
NEUTROPHILS NFR BLD: 83.5 % (ref 38–73)
NEUTROPHILS NFR BLD: 83.8 % (ref 38–73)
NEUTROPHILS NFR BLD: 83.9 % (ref 38–73)
NEUTROPHILS NFR BLD: 84 % (ref 38–73)
NEUTROPHILS NFR BLD: 84.6 % (ref 38–73)
NEUTROPHILS NFR BLD: 86.6 % (ref 38–73)
NEUTROPHILS NFR BLD: 87.4 % (ref 38–73)
NEUTROPHILS NFR BLD: 88.4 % (ref 38–73)
NEUTROPHILS NFR BLD: 88.7 % (ref 38–73)
NEUTROPHILS NFR BLD: 90.2 % (ref 38–73)
NEUTROPHILS NFR BLD: 90.3 % (ref 38–73)
NEUTROPHILS NFR BLD: 90.4 % (ref 38–73)
NEUTROPHILS NFR BLD: 91.2 % (ref 38–73)
NITRITE UR QL STRIP: NEGATIVE
NITRITE UR QL STRIP: NEGATIVE
NRBC BLD-RTO: 0 /100 WBC
NRBC BLD-RTO: 3 /100 WBC
NUM UNITS TRANS PACKED RBC: NORMAL
NUM UNITS TRANS PACKED RBC: NORMAL
OVALOCYTES BLD QL SMEAR: ABNORMAL
PCO2 BLDA: 31.4 MMHG (ref 35–45)
PCO2 BLDA: 31.9 MMHG (ref 35–45)
PCO2 BLDA: 39.4 MMHG (ref 35–45)
PCO2 BLDA: 46.2 MMHG (ref 35–45)
PEEP: 5
PH SMN: 7.04 [PH] (ref 7.35–7.45)
PH SMN: 7.18 [PH] (ref 7.35–7.45)
PH SMN: 7.22 [PH] (ref 7.35–7.45)
PH SMN: 7.24 [PH] (ref 7.35–7.45)
PH UR STRIP: 7 [PH] (ref 5–8)
PH UR STRIP: 8 [PH] (ref 5–8)
PHOSPHATE SERPL-MCNC: 5.9 MG/DL (ref 2.7–4.5)
PIP: 41
PISA MRMAX VEL: 0.03 M/S
PISA TR MAX VEL: 2.78 M/S
PLATELET # BLD AUTO: 382 K/UL (ref 150–450)
PLATELET # BLD AUTO: 417 K/UL (ref 150–450)
PLATELET # BLD AUTO: 512 K/UL (ref 150–450)
PLATELET # BLD AUTO: 526 K/UL (ref 150–450)
PLATELET # BLD AUTO: 541 K/UL (ref 150–450)
PLATELET # BLD AUTO: 545 K/UL (ref 150–450)
PLATELET # BLD AUTO: 547 K/UL (ref 150–450)
PLATELET # BLD AUTO: 553 K/UL (ref 150–450)
PLATELET # BLD AUTO: 558 K/UL (ref 150–450)
PLATELET # BLD AUTO: 560 K/UL (ref 150–450)
PLATELET # BLD AUTO: 560 K/UL (ref 150–450)
PLATELET # BLD AUTO: 564 K/UL (ref 150–450)
PLATELET # BLD AUTO: 570 K/UL (ref 150–450)
PLATELET # BLD AUTO: 572 K/UL (ref 150–450)
PLATELET # BLD AUTO: 576 K/UL (ref 150–450)
PLATELET # BLD AUTO: 579 K/UL (ref 150–450)
PLATELET # BLD AUTO: 580 K/UL (ref 150–450)
PLATELET # BLD AUTO: 583 K/UL (ref 150–450)
PLATELET # BLD AUTO: 585 K/UL (ref 150–450)
PLATELET # BLD AUTO: 604 K/UL (ref 150–450)
PLATELET # BLD AUTO: 608 K/UL (ref 150–450)
PLATELET # BLD AUTO: 626 K/UL (ref 150–450)
PLATELET # BLD AUTO: 635 K/UL (ref 150–450)
PLATELET BLD QL SMEAR: ABNORMAL
PMV BLD AUTO: 8.7 FL (ref 9.2–12.9)
PMV BLD AUTO: 8.7 FL (ref 9.2–12.9)
PMV BLD AUTO: 8.8 FL (ref 9.2–12.9)
PMV BLD AUTO: 8.9 FL (ref 9.2–12.9)
PMV BLD AUTO: 9 FL (ref 9.2–12.9)
PMV BLD AUTO: 9.1 FL (ref 9.2–12.9)
PMV BLD AUTO: 9.2 FL (ref 9.2–12.9)
PMV BLD AUTO: 9.2 FL (ref 9.2–12.9)
PO2 BLDA: 127 MMHG (ref 80–100)
PO2 BLDA: 145 MMHG (ref 80–100)
PO2 BLDA: 22 MMHG (ref 40–60)
PO2 BLDA: 36 MMHG (ref 40–60)
POC BE: -15 MMOL/L
POC BE: -16 MMOL/L
POC BE: -20 MMOL/L
POC BE: -8 MMOL/L
POC SATURATED O2: 29 % (ref 95–100)
POC SATURATED O2: 46 % (ref 95–100)
POC SATURATED O2: 98 % (ref 95–100)
POC SATURATED O2: 99 % (ref 95–100)
POC SVO2: 29 %
POC TCO2: 12 MMOL/L (ref 24–29)
POC TCO2: 13 MMOL/L (ref 23–27)
POC TCO2: 14 MMOL/L (ref 23–27)
POC TCO2: 21 MMOL/L (ref 24–29)
POCT GLUCOSE: 40 MG/DL (ref 70–110)
POCT GLUCOSE: 45 MG/DL (ref 70–110)
POCT GLUCOSE: 57 MG/DL (ref 70–110)
POCT GLUCOSE: 74 MG/DL (ref 70–110)
POCT GLUCOSE: 79 MG/DL (ref 70–110)
POCT GLUCOSE: 80 MG/DL (ref 70–110)
POCT GLUCOSE: 80 MG/DL (ref 70–110)
POCT GLUCOSE: >500 MG/DL (ref 70–110)
POIKILOCYTOSIS BLD QL SMEAR: ABNORMAL
POIKILOCYTOSIS BLD QL SMEAR: SLIGHT
POLYCHROMASIA BLD QL SMEAR: ABNORMAL
POTASSIUM SERPL-SCNC: 2.9 MMOL/L (ref 3.5–5.1)
POTASSIUM SERPL-SCNC: 3.3 MMOL/L (ref 3.5–5.1)
POTASSIUM SERPL-SCNC: 3.5 MMOL/L (ref 3.5–5.1)
POTASSIUM SERPL-SCNC: 3.7 MMOL/L (ref 3.5–5.1)
POTASSIUM SERPL-SCNC: 3.9 MMOL/L (ref 3.5–5.1)
POTASSIUM SERPL-SCNC: 4 MMOL/L (ref 3.5–5.1)
POTASSIUM SERPL-SCNC: 4 MMOL/L (ref 3.5–5.1)
POTASSIUM SERPL-SCNC: 4.1 MMOL/L (ref 3.5–5.1)
POTASSIUM SERPL-SCNC: 4.2 MMOL/L (ref 3.5–5.1)
POTASSIUM SERPL-SCNC: 4.3 MMOL/L (ref 3.5–5.1)
POTASSIUM SERPL-SCNC: 4.5 MMOL/L (ref 3.5–5.1)
POTASSIUM SERPL-SCNC: 4.6 MMOL/L (ref 3.5–5.1)
POTASSIUM SERPL-SCNC: 4.7 MMOL/L (ref 3.5–5.1)
POTASSIUM SERPL-SCNC: 4.7 MMOL/L (ref 3.5–5.1)
POTASSIUM SERPL-SCNC: 4.8 MMOL/L (ref 3.5–5.1)
POTASSIUM SERPL-SCNC: 5.4 MMOL/L (ref 3.5–5.1)
POTASSIUM SERPL-SCNC: 6.6 MMOL/L (ref 3.5–5.1)
PREALB SERPL-MCNC: 5 MG/DL (ref 20–43)
PREALB SERPL-MCNC: 5 MG/DL (ref 20–43)
PREALB SERPL-MCNC: 6 MG/DL (ref 20–43)
PREALB SERPL-MCNC: 7 MG/DL (ref 20–43)
PREALB SERPL-MCNC: 8 MG/DL (ref 20–43)
PREALB SERPL-MCNC: 8 MG/DL (ref 20–43)
PROCALCITONIN SERPL IA-MCNC: 4.9 NG/ML
PROT SERPL-MCNC: 6.6 G/DL (ref 6–8.4)
PROT SERPL-MCNC: 6.7 G/DL (ref 6–8.4)
PROT SERPL-MCNC: 6.8 G/DL (ref 6–8.4)
PROT SERPL-MCNC: 6.8 G/DL (ref 6–8.4)
PROT SERPL-MCNC: 7 G/DL (ref 6–8.4)
PROT SERPL-MCNC: 7.1 G/DL (ref 6–8.4)
PROT SERPL-MCNC: 7.1 G/DL (ref 6–8.4)
PROT SERPL-MCNC: 7.3 G/DL (ref 6–8.4)
PROT SERPL-MCNC: 7.5 G/DL (ref 6–8.4)
PROT SERPL-MCNC: 7.8 G/DL (ref 6–8.4)
PROT SERPL-MCNC: 7.9 G/DL (ref 6–8.4)
PROT UR QL STRIP: ABNORMAL
PROT UR QL STRIP: NEGATIVE
PROTHROMBIN TIME: 15.3 SEC (ref 9–12.5)
PROTHROMBIN TIME: 15.3 SEC (ref 9–12.5)
PROTHROMBIN TIME: 15.4 SEC (ref 9–12.5)
PROTHROMBIN TIME: 15.8 SEC (ref 9–12.5)
PROTHROMBIN TIME: 16.3 SEC (ref 9–12.5)
PROTHROMBIN TIME: 16.4 SEC (ref 9–12.5)
PROTHROMBIN TIME: 17.1 SEC (ref 9–12.5)
PROTHROMBIN TIME: 17.3 SEC (ref 9–12.5)
PROTHROMBIN TIME: 19.6 SEC (ref 9–12.5)
PROTHROMBIN TIME: 20 SEC (ref 9–12.5)
PROTHROMBIN TIME: 21.1 SEC (ref 9–12.5)
PROTHROMBIN TIME: 21.5 SEC (ref 9–12.5)
PROTHROMBIN TIME: 22.1 SEC (ref 9–12.5)
PROTHROMBIN TIME: 22.4 SEC (ref 9–12.5)
PROTHROMBIN TIME: 23.6 SEC (ref 9–12.5)
PROTHROMBIN TIME: 24 SEC (ref 9–12.5)
PROTHROMBIN TIME: 24.1 SEC (ref 9–12.5)
PROTHROMBIN TIME: 24.2 SEC (ref 9–12.5)
PROTHROMBIN TIME: 24.6 SEC (ref 9–12.5)
PROTHROMBIN TIME: 26.3 SEC (ref 9–12.5)
PROTHROMBIN TIME: 27.2 SEC (ref 9–12.5)
PROTHROMBIN TIME: 27.2 SEC (ref 9–12.5)
PROTHROMBIN TIME: 30.8 SEC (ref 9–12.5)
PROTHROMBIN TIME: 31 SEC (ref 9–12.5)
PROTHROMBIN TIME: 36 SEC (ref 9–12.5)
PV MEAN GRADIENT: 0.59 MMHG
RA MAJOR: 4.43 CM
RA PRESSURE: 15 MMHG
RA WIDTH: 4.11 CM
RBC # BLD AUTO: 2.86 M/UL (ref 4.6–6.2)
RBC # BLD AUTO: 3.4 M/UL (ref 4.6–6.2)
RBC # BLD AUTO: 3.48 M/UL (ref 4.6–6.2)
RBC # BLD AUTO: 3.73 M/UL (ref 4.6–6.2)
RBC # BLD AUTO: 3.75 M/UL (ref 4.6–6.2)
RBC # BLD AUTO: 3.79 M/UL (ref 4.6–6.2)
RBC # BLD AUTO: 3.94 M/UL (ref 4.6–6.2)
RBC # BLD AUTO: 3.97 M/UL (ref 4.6–6.2)
RBC # BLD AUTO: 4.08 M/UL (ref 4.6–6.2)
RBC # BLD AUTO: 4.09 M/UL (ref 4.6–6.2)
RBC # BLD AUTO: 4.11 M/UL (ref 4.6–6.2)
RBC # BLD AUTO: 4.17 M/UL (ref 4.6–6.2)
RBC # BLD AUTO: 4.32 M/UL (ref 4.6–6.2)
RBC # BLD AUTO: 4.37 M/UL (ref 4.6–6.2)
RBC # BLD AUTO: 4.59 M/UL (ref 4.6–6.2)
RBC # BLD AUTO: 4.6 M/UL (ref 4.6–6.2)
RBC # BLD AUTO: 4.67 M/UL (ref 4.6–6.2)
RBC # BLD AUTO: 4.69 M/UL (ref 4.6–6.2)
RBC # BLD AUTO: 4.9 M/UL (ref 4.6–6.2)
RBC # BLD AUTO: 5.01 M/UL (ref 4.6–6.2)
RBC # BLD AUTO: 5.01 M/UL (ref 4.6–6.2)
RBC # BLD AUTO: 5.16 M/UL (ref 4.6–6.2)
RBC # BLD AUTO: 5.42 M/UL (ref 4.6–6.2)
RBC #/AREA URNS AUTO: 1 /HPF (ref 0–4)
RBC #/AREA URNS AUTO: 3 /HPF (ref 0–4)
RESPIRATORY INFECTION PANEL SOURCE: NORMAL
RIGHT VENTRICULAR END-DIASTOLIC DIMENSION: 3.02 CM
RSV RNA NPH QL NAA+NON-PROBE: NOT DETECTED
RV+EV RNA NPH QL NAA+NON-PROBE: NOT DETECTED
SAMPLE: ABNORMAL
SARS-COV-2 RDRP RESP QL NAA+PROBE: NEGATIVE
SARS-COV-2 RDRP RESP QL NAA+PROBE: POSITIVE
SARS-COV-2 RNA RESP QL NAA+PROBE: NOT DETECTED
SCHISTOCYTES BLD QL SMEAR: ABNORMAL
SCHISTOCYTES BLD QL SMEAR: PRESENT
SINUS: 3.03 CM
SITE: ABNORMAL
SODIUM SERPL-SCNC: 129 MMOL/L (ref 136–145)
SODIUM SERPL-SCNC: 129 MMOL/L (ref 136–145)
SODIUM SERPL-SCNC: 131 MMOL/L (ref 136–145)
SODIUM SERPL-SCNC: 132 MMOL/L (ref 136–145)
SODIUM SERPL-SCNC: 133 MMOL/L (ref 136–145)
SODIUM SERPL-SCNC: 134 MMOL/L (ref 136–145)
SODIUM SERPL-SCNC: 135 MMOL/L (ref 136–145)
SODIUM SERPL-SCNC: 135 MMOL/L (ref 136–145)
SODIUM SERPL-SCNC: 136 MMOL/L (ref 136–145)
SODIUM SERPL-SCNC: 137 MMOL/L (ref 136–145)
SP GR UR STRIP: 1.01 (ref 1–1.03)
SP GR UR STRIP: 1.01 (ref 1–1.03)
SP02: 100
SP02: 94
SP02: 98
SQUAMOUS #/AREA URNS AUTO: 1 /HPF
SQUAMOUS #/AREA URNS AUTO: 1 /HPF
STJ: 2.5 CM
T4 FREE SERPL-MCNC: 0.74 NG/DL (ref 0.71–1.51)
TARGETS BLD QL SMEAR: ABNORMAL
TDI LATERAL: 0.07 M/S
TDI SEPTAL: 0.05 M/S
TDI: 0.06 M/S
TR MAX PG: 31 MMHG
TRICUSPID ANNULAR PLANE SYSTOLIC EXCURSION: 0.99 CM
TROPONIN I SERPL DL<=0.01 NG/ML-MCNC: 0.84 NG/ML (ref 0–0.03)
TSH SERPL DL<=0.005 MIU/L-ACNC: 1.19 UIU/ML (ref 0.4–4)
TV REST PULMONARY ARTERY PRESSURE: 46 MMHG
URN SPEC COLLECT METH UR: ABNORMAL
URN SPEC COLLECT METH UR: ABNORMAL
VT: 400
WBC # BLD AUTO: 12.7 K/UL (ref 3.9–12.7)
WBC # BLD AUTO: 13.05 K/UL (ref 3.9–12.7)
WBC # BLD AUTO: 13.11 K/UL (ref 3.9–12.7)
WBC # BLD AUTO: 13.51 K/UL (ref 3.9–12.7)
WBC # BLD AUTO: 13.81 K/UL (ref 3.9–12.7)
WBC # BLD AUTO: 14.28 K/UL (ref 3.9–12.7)
WBC # BLD AUTO: 14.55 K/UL (ref 3.9–12.7)
WBC # BLD AUTO: 14.55 K/UL (ref 3.9–12.7)
WBC # BLD AUTO: 15.29 K/UL (ref 3.9–12.7)
WBC # BLD AUTO: 15.37 K/UL (ref 3.9–12.7)
WBC # BLD AUTO: 15.73 K/UL (ref 3.9–12.7)
WBC # BLD AUTO: 16.01 K/UL (ref 3.9–12.7)
WBC # BLD AUTO: 16.22 K/UL (ref 3.9–12.7)
WBC # BLD AUTO: 16.54 K/UL (ref 3.9–12.7)
WBC # BLD AUTO: 17.51 K/UL (ref 3.9–12.7)
WBC # BLD AUTO: 18.22 K/UL (ref 3.9–12.7)
WBC # BLD AUTO: 19.37 K/UL (ref 3.9–12.7)
WBC # BLD AUTO: 19.44 K/UL (ref 3.9–12.7)
WBC # BLD AUTO: 19.85 K/UL (ref 3.9–12.7)
WBC # BLD AUTO: 20.18 K/UL (ref 3.9–12.7)
WBC # BLD AUTO: 21.74 K/UL (ref 3.9–12.7)
WBC # BLD AUTO: 22.11 K/UL (ref 3.9–12.7)
WBC # BLD AUTO: 26.01 K/UL (ref 3.9–12.7)
WBC #/AREA URNS AUTO: 1 /HPF (ref 0–5)
WBC #/AREA URNS AUTO: 3 /HPF (ref 0–5)

## 2022-01-01 PROCEDURE — 83880 ASSAY OF NATRIURETIC PEPTIDE: CPT | Performed by: INTERNAL MEDICINE

## 2022-01-01 PROCEDURE — 83735 ASSAY OF MAGNESIUM: CPT | Performed by: INTERNAL MEDICINE

## 2022-01-01 PROCEDURE — 99233 SBSQ HOSP IP/OBS HIGH 50: CPT | Mod: ,,, | Performed by: NURSE PRACTITIONER

## 2022-01-01 PROCEDURE — 25000003 PHARM REV CODE 250: Performed by: INTERNAL MEDICINE

## 2022-01-01 PROCEDURE — 85610 PROTHROMBIN TIME: CPT | Mod: 91 | Performed by: NURSE PRACTITIONER

## 2022-01-01 PROCEDURE — 97116 GAIT TRAINING THERAPY: CPT

## 2022-01-01 PROCEDURE — 93793 PR ANTICOAGULANT MGMT FOR PT TAKING WARFARIN: ICD-10-PCS | Mod: S$GLB,,,

## 2022-01-01 PROCEDURE — 97535 SELF CARE MNGMENT TRAINING: CPT | Mod: CO

## 2022-01-01 PROCEDURE — 27000248 HC VAD-ADDITIONAL DAY

## 2022-01-01 PROCEDURE — 36415 COLL VENOUS BLD VENIPUNCTURE: CPT | Performed by: INTERNAL MEDICINE

## 2022-01-01 PROCEDURE — 63600175 PHARM REV CODE 636 W HCPCS: Performed by: NURSE ANESTHETIST, CERTIFIED REGISTERED

## 2022-01-01 PROCEDURE — 93750 INTERROGATION VAD IN PERSON: CPT | Mod: ,,, | Performed by: INTERNAL MEDICINE

## 2022-01-01 PROCEDURE — 83615 LACTATE (LD) (LDH) ENZYME: CPT | Performed by: INTERNAL MEDICINE

## 2022-01-01 PROCEDURE — 25000242 PHARM REV CODE 250 ALT 637 W/ HCPCS: Performed by: PHYSICIAN ASSISTANT

## 2022-01-01 PROCEDURE — 25000003 PHARM REV CODE 250: Performed by: PHYSICIAN ASSISTANT

## 2022-01-01 PROCEDURE — 93793 PR ANTICOAGULANT MGMT FOR PT TAKING WARFARIN: ICD-10-PCS | Mod: ,,,

## 2022-01-01 PROCEDURE — 63600175 PHARM REV CODE 636 W HCPCS: Performed by: NURSE PRACTITIONER

## 2022-01-01 PROCEDURE — 87040 BLOOD CULTURE FOR BACTERIA: CPT | Performed by: STUDENT IN AN ORGANIZED HEALTH CARE EDUCATION/TRAINING PROGRAM

## 2022-01-01 PROCEDURE — 80053 COMPREHEN METABOLIC PANEL: CPT | Performed by: STUDENT IN AN ORGANIZED HEALTH CARE EDUCATION/TRAINING PROGRAM

## 2022-01-01 PROCEDURE — 87040 BLOOD CULTURE FOR BACTERIA: CPT | Performed by: INTERNAL MEDICINE

## 2022-01-01 PROCEDURE — 80048 BASIC METABOLIC PNL TOTAL CA: CPT | Performed by: INTERNAL MEDICINE

## 2022-01-01 PROCEDURE — 94761 N-INVAS EAR/PLS OXIMETRY MLT: CPT

## 2022-01-01 PROCEDURE — 20600001 HC STEP DOWN PRIVATE ROOM

## 2022-01-01 PROCEDURE — 99233 PR SUBSEQUENT HOSPITAL CARE,LEVL III: ICD-10-PCS | Mod: ,,, | Performed by: PHYSICIAN ASSISTANT

## 2022-01-01 PROCEDURE — 99223 PR INITIAL HOSPITAL CARE,LEVL III: ICD-10-PCS | Mod: ,,, | Performed by: PSYCHIATRY & NEUROLOGY

## 2022-01-01 PROCEDURE — 99900035 HC TECH TIME PER 15 MIN (STAT)

## 2022-01-01 PROCEDURE — 25000003 PHARM REV CODE 250: Performed by: NURSE PRACTITIONER

## 2022-01-01 PROCEDURE — 85007 BL SMEAR W/DIFF WBC COUNT: CPT | Performed by: INTERNAL MEDICINE

## 2022-01-01 PROCEDURE — 93750 PR INTERROGATE VENT ASSIST DEV, IN PERSON, W PHYSICIAN ANALYSIS: ICD-10-PCS | Mod: ,,, | Performed by: INTERNAL MEDICINE

## 2022-01-01 PROCEDURE — 63600175 PHARM REV CODE 636 W HCPCS: Performed by: INTERNAL MEDICINE

## 2022-01-01 PROCEDURE — 85730 THROMBOPLASTIN TIME PARTIAL: CPT | Performed by: INTERNAL MEDICINE

## 2022-01-01 PROCEDURE — 36620 INSERTION CATHETER ARTERY: CPT | Mod: 59,,, | Performed by: ANESTHESIOLOGY

## 2022-01-01 PROCEDURE — 25000003 PHARM REV CODE 250: Performed by: STUDENT IN AN ORGANIZED HEALTH CARE EDUCATION/TRAINING PROGRAM

## 2022-01-01 PROCEDURE — 83605 ASSAY OF LACTIC ACID: CPT | Performed by: STUDENT IN AN ORGANIZED HEALTH CARE EDUCATION/TRAINING PROGRAM

## 2022-01-01 PROCEDURE — 99233 PR SUBSEQUENT HOSPITAL CARE,LEVL III: ICD-10-PCS | Mod: ,,, | Performed by: NURSE PRACTITIONER

## 2022-01-01 PROCEDURE — 85025 COMPLETE CBC W/AUTO DIFF WBC: CPT | Performed by: INTERNAL MEDICINE

## 2022-01-01 PROCEDURE — 27000207 HC ISOLATION

## 2022-01-01 PROCEDURE — 99233 SBSQ HOSP IP/OBS HIGH 50: CPT | Mod: CR,,, | Performed by: NURSE PRACTITIONER

## 2022-01-01 PROCEDURE — 86850 RBC ANTIBODY SCREEN: CPT | Performed by: PHYSICIAN ASSISTANT

## 2022-01-01 PROCEDURE — 84134 ASSAY OF PREALBUMIN: CPT | Performed by: INTERNAL MEDICINE

## 2022-01-01 PROCEDURE — 63600175 PHARM REV CODE 636 W HCPCS: Performed by: PHYSICIAN ASSISTANT

## 2022-01-01 PROCEDURE — G0180 MD CERTIFICATION HHA PATIENT: HCPCS | Mod: ,,, | Performed by: INTERNAL MEDICINE

## 2022-01-01 PROCEDURE — 94640 AIRWAY INHALATION TREATMENT: CPT

## 2022-01-01 PROCEDURE — 87040 BLOOD CULTURE FOR BACTERIA: CPT | Performed by: PHYSICIAN ASSISTANT

## 2022-01-01 PROCEDURE — 87186 SC STD MICRODIL/AGAR DIL: CPT | Performed by: STUDENT IN AN ORGANIZED HEALTH CARE EDUCATION/TRAINING PROGRAM

## 2022-01-01 PROCEDURE — 99233 SBSQ HOSP IP/OBS HIGH 50: CPT | Mod: 95,,, | Performed by: NURSE PRACTITIONER

## 2022-01-01 PROCEDURE — 97530 THERAPEUTIC ACTIVITIES: CPT

## 2022-01-01 PROCEDURE — 94760 N-INVAS EAR/PLS OXIMETRY 1: CPT

## 2022-01-01 PROCEDURE — 27000221 HC OXYGEN, UP TO 24 HOURS

## 2022-01-01 PROCEDURE — 87449 NOS EACH ORGANISM AG IA: CPT | Performed by: NURSE PRACTITIONER

## 2022-01-01 PROCEDURE — 99233 PR SUBSEQUENT HOSPITAL CARE,LEVL III: ICD-10-PCS | Mod: ,,, | Performed by: INTERNAL MEDICINE

## 2022-01-01 PROCEDURE — 93793 ANTICOAG MGMT PT WARFARIN: CPT | Mod: S$GLB,,,

## 2022-01-01 PROCEDURE — U0002 COVID-19 LAB TEST NON-CDC: HCPCS | Performed by: STUDENT IN AN ORGANIZED HEALTH CARE EDUCATION/TRAINING PROGRAM

## 2022-01-01 PROCEDURE — 87186 SC STD MICRODIL/AGAR DIL: CPT | Performed by: INTERNAL MEDICINE

## 2022-01-01 PROCEDURE — 20000000 HC ICU ROOM

## 2022-01-01 PROCEDURE — 85610 PROTHROMBIN TIME: CPT | Mod: 91 | Performed by: INTERNAL MEDICINE

## 2022-01-01 PROCEDURE — 99233 SBSQ HOSP IP/OBS HIGH 50: CPT | Mod: 95,,, | Performed by: PHYSICIAN ASSISTANT

## 2022-01-01 PROCEDURE — 99233 PR SUBSEQUENT HOSPITAL CARE,LEVL III: ICD-10-PCS | Mod: CR,,, | Performed by: NURSE PRACTITIONER

## 2022-01-01 PROCEDURE — 82803 BLOOD GASES ANY COMBINATION: CPT

## 2022-01-01 PROCEDURE — 85610 PROTHROMBIN TIME: CPT | Performed by: INTERNAL MEDICINE

## 2022-01-01 PROCEDURE — 83615 LACTATE (LD) (LDH) ENZYME: CPT | Performed by: PHYSICIAN ASSISTANT

## 2022-01-01 PROCEDURE — 36415 COLL VENOUS BLD VENIPUNCTURE: CPT | Performed by: NURSE PRACTITIONER

## 2022-01-01 PROCEDURE — 85610 PROTHROMBIN TIME: CPT | Performed by: STUDENT IN AN ORGANIZED HEALTH CARE EDUCATION/TRAINING PROGRAM

## 2022-01-01 PROCEDURE — 94003 VENT MGMT INPAT SUBQ DAY: CPT

## 2022-01-01 PROCEDURE — 99291 CRITICAL CARE FIRST HOUR: CPT | Mod: ,,, | Performed by: INTERNAL MEDICINE

## 2022-01-01 PROCEDURE — 93010 EKG 12-LEAD: ICD-10-PCS | Mod: ,,, | Performed by: INTERNAL MEDICINE

## 2022-01-01 PROCEDURE — 99499 UNLISTED E&M SERVICE: CPT | Mod: ,,, | Performed by: INTERNAL MEDICINE

## 2022-01-01 PROCEDURE — 86140 C-REACTIVE PROTEIN: CPT | Performed by: INTERNAL MEDICINE

## 2022-01-01 PROCEDURE — 80076 HEPATIC FUNCTION PANEL: CPT | Performed by: INTERNAL MEDICINE

## 2022-01-01 PROCEDURE — 83735 ASSAY OF MAGNESIUM: CPT | Mod: 91 | Performed by: INTERNAL MEDICINE

## 2022-01-01 PROCEDURE — 36415 COLL VENOUS BLD VENIPUNCTURE: CPT | Performed by: PHYSICIAN ASSISTANT

## 2022-01-01 PROCEDURE — D9220A PRA ANESTHESIA: ICD-10-PCS | Mod: ANES,,, | Performed by: ANESTHESIOLOGY

## 2022-01-01 PROCEDURE — 83615 LACTATE (LD) (LDH) ENZYME: CPT | Mod: 91 | Performed by: INTERNAL MEDICINE

## 2022-01-01 PROCEDURE — 87040 BLOOD CULTURE FOR BACTERIA: CPT | Mod: 59 | Performed by: INTERNAL MEDICINE

## 2022-01-01 PROCEDURE — 85027 COMPLETE CBC AUTOMATED: CPT | Performed by: INTERNAL MEDICINE

## 2022-01-01 PROCEDURE — 99223 1ST HOSP IP/OBS HIGH 75: CPT | Mod: AI,,, | Performed by: INTERNAL MEDICINE

## 2022-01-01 PROCEDURE — 97161 PT EVAL LOW COMPLEX 20 MIN: CPT

## 2022-01-01 PROCEDURE — 83735 ASSAY OF MAGNESIUM: CPT | Performed by: STUDENT IN AN ORGANIZED HEALTH CARE EDUCATION/TRAINING PROGRAM

## 2022-01-01 PROCEDURE — D9220A PRA ANESTHESIA: Mod: CRNA,,, | Performed by: NURSE ANESTHETIST, CERTIFIED REGISTERED

## 2022-01-01 PROCEDURE — 83615 LACTATE (LD) (LDH) ENZYME: CPT | Performed by: STUDENT IN AN ORGANIZED HEALTH CARE EDUCATION/TRAINING PROGRAM

## 2022-01-01 PROCEDURE — 97165 OT EVAL LOW COMPLEX 30 MIN: CPT

## 2022-01-01 PROCEDURE — 37799 UNLISTED PX VASCULAR SURGERY: CPT

## 2022-01-01 PROCEDURE — 80048 BASIC METABOLIC PNL TOTAL CA: CPT | Performed by: NURSE PRACTITIONER

## 2022-01-01 PROCEDURE — 84145 PROCALCITONIN (PCT): CPT | Performed by: STUDENT IN AN ORGANIZED HEALTH CARE EDUCATION/TRAINING PROGRAM

## 2022-01-01 PROCEDURE — 97535 SELF CARE MNGMENT TRAINING: CPT

## 2022-01-01 PROCEDURE — 82550 ASSAY OF CK (CPK): CPT | Performed by: NURSE PRACTITIONER

## 2022-01-01 PROCEDURE — 84439 ASSAY OF FREE THYROXINE: CPT | Performed by: PHYSICIAN ASSISTANT

## 2022-01-01 PROCEDURE — 82550 ASSAY OF CK (CPK): CPT | Performed by: INTERNAL MEDICINE

## 2022-01-01 PROCEDURE — 36430 TRANSFUSION BLD/BLD COMPNT: CPT

## 2022-01-01 PROCEDURE — 51702 INSERT TEMP BLADDER CATH: CPT

## 2022-01-01 PROCEDURE — 99233 SBSQ HOSP IP/OBS HIGH 50: CPT | Mod: ,,, | Performed by: PHYSICIAN ASSISTANT

## 2022-01-01 PROCEDURE — 63600175 PHARM REV CODE 636 W HCPCS: Performed by: STUDENT IN AN ORGANIZED HEALTH CARE EDUCATION/TRAINING PROGRAM

## 2022-01-01 PROCEDURE — 99233 PR SUBSEQUENT HOSPITAL CARE,LEVL III: ICD-10-PCS | Mod: 95,,, | Performed by: PHYSICIAN ASSISTANT

## 2022-01-01 PROCEDURE — 99233 SBSQ HOSP IP/OBS HIGH 50: CPT | Mod: ,,, | Performed by: INTERNAL MEDICINE

## 2022-01-01 PROCEDURE — 99900026 HC AIRWAY MAINTENANCE (STAT)

## 2022-01-01 PROCEDURE — 87040 BLOOD CULTURE FOR BACTERIA: CPT | Performed by: NURSE PRACTITIONER

## 2022-01-01 PROCEDURE — 36620 PR INSERT CATH,ART,PERCUT,SHORTTERM: ICD-10-PCS | Mod: 59,,, | Performed by: ANESTHESIOLOGY

## 2022-01-01 PROCEDURE — 99223 PR INITIAL HOSPITAL CARE,LEVL III: ICD-10-PCS | Mod: ,,, | Performed by: NURSE PRACTITIONER

## 2022-01-01 PROCEDURE — D9220A PRA ANESTHESIA: ICD-10-PCS | Mod: CRNA,,, | Performed by: NURSE ANESTHETIST, CERTIFIED REGISTERED

## 2022-01-01 PROCEDURE — 93793 ANTICOAG MGMT PT WARFARIN: CPT | Mod: ,,,

## 2022-01-01 PROCEDURE — 85730 THROMBOPLASTIN TIME PARTIAL: CPT | Mod: 91 | Performed by: INTERNAL MEDICINE

## 2022-01-01 PROCEDURE — 84100 ASSAY OF PHOSPHORUS: CPT | Performed by: STUDENT IN AN ORGANIZED HEALTH CARE EDUCATION/TRAINING PROGRAM

## 2022-01-01 PROCEDURE — 80048 BASIC METABOLIC PNL TOTAL CA: CPT | Mod: 91 | Performed by: INTERNAL MEDICINE

## 2022-01-01 PROCEDURE — P9016 RBC LEUKOCYTES REDUCED: HCPCS | Performed by: PHYSICIAN ASSISTANT

## 2022-01-01 PROCEDURE — 99223 PR INITIAL HOSPITAL CARE,LEVL III: ICD-10-PCS | Mod: AI,,, | Performed by: INTERNAL MEDICINE

## 2022-01-01 PROCEDURE — 63600367 HC NITRIC OXIDE PER HOUR

## 2022-01-01 PROCEDURE — U0003 INFECTIOUS AGENT DETECTION BY NUCLEIC ACID (DNA OR RNA); SEVERE ACUTE RESPIRATORY SYNDROME CORONAVIRUS 2 (SARS-COV-2) (CORONAVIRUS DISEASE [COVID-19]), AMPLIFIED PROBE TECHNIQUE, MAKING USE OF HIGH THROUGHPUT TECHNOLOGIES AS DESCRIBED BY CMS-2020-01-R: HCPCS | Performed by: INTERNAL MEDICINE

## 2022-01-01 PROCEDURE — 93005 ELECTROCARDIOGRAM TRACING: CPT

## 2022-01-01 PROCEDURE — 87040 BLOOD CULTURE FOR BACTERIA: CPT | Mod: 59 | Performed by: NURSE PRACTITIONER

## 2022-01-01 PROCEDURE — U0002 COVID-19 LAB TEST NON-CDC: HCPCS | Performed by: HOSPITALIST

## 2022-01-01 PROCEDURE — 99223 1ST HOSP IP/OBS HIGH 75: CPT | Mod: ,,, | Performed by: PSYCHIATRY & NEUROLOGY

## 2022-01-01 PROCEDURE — 84484 ASSAY OF TROPONIN QUANT: CPT | Performed by: INTERNAL MEDICINE

## 2022-01-01 PROCEDURE — 99223 1ST HOSP IP/OBS HIGH 75: CPT | Mod: ,,, | Performed by: NURSE PRACTITIONER

## 2022-01-01 PROCEDURE — 99223 PR INITIAL HOSPITAL CARE,LEVL III: ICD-10-PCS | Mod: GC,,, | Performed by: INTERNAL MEDICINE

## 2022-01-01 PROCEDURE — 87077 CULTURE AEROBIC IDENTIFY: CPT | Performed by: STUDENT IN AN ORGANIZED HEALTH CARE EDUCATION/TRAINING PROGRAM

## 2022-01-01 PROCEDURE — 84443 ASSAY THYROID STIM HORMONE: CPT | Performed by: PHYSICIAN ASSISTANT

## 2022-01-01 PROCEDURE — 25000003 PHARM REV CODE 250: Performed by: NURSE ANESTHETIST, CERTIFIED REGISTERED

## 2022-01-01 PROCEDURE — U0005 INFEC AGEN DETEC AMPLI PROBE: HCPCS | Performed by: INTERNAL MEDICINE

## 2022-01-01 PROCEDURE — 25000242 PHARM REV CODE 250 ALT 637 W/ HCPCS: Performed by: INTERNAL MEDICINE

## 2022-01-01 PROCEDURE — 83605 ASSAY OF LACTIC ACID: CPT | Performed by: INTERNAL MEDICINE

## 2022-01-01 PROCEDURE — 87075 CULTR BACTERIA EXCEPT BLOOD: CPT | Performed by: STUDENT IN AN ORGANIZED HEALTH CARE EDUCATION/TRAINING PROGRAM

## 2022-01-01 PROCEDURE — G0180 PR HOME HEALTH MD CERTIFICATION: ICD-10-PCS | Mod: ,,, | Performed by: INTERNAL MEDICINE

## 2022-01-01 PROCEDURE — 27201037 HC PRESSURE MONITORING SET UP

## 2022-01-01 PROCEDURE — D9220A PRA ANESTHESIA: Mod: ANES,,, | Performed by: ANESTHESIOLOGY

## 2022-01-01 PROCEDURE — 86644 CMV ANTIBODY: CPT | Performed by: PHYSICIAN ASSISTANT

## 2022-01-01 PROCEDURE — 81001 URINALYSIS AUTO W/SCOPE: CPT | Performed by: INTERNAL MEDICINE

## 2022-01-01 PROCEDURE — 99233 PR SUBSEQUENT HOSPITAL CARE,LEVL III: ICD-10-PCS | Mod: 95,,, | Performed by: NURSE PRACTITIONER

## 2022-01-01 PROCEDURE — 93010 ELECTROCARDIOGRAM REPORT: CPT | Mod: ,,, | Performed by: INTERNAL MEDICINE

## 2022-01-01 PROCEDURE — 86920 COMPATIBILITY TEST SPIN: CPT | Performed by: PHYSICIAN ASSISTANT

## 2022-01-01 PROCEDURE — 80048 BASIC METABOLIC PNL TOTAL CA: CPT | Performed by: STUDENT IN AN ORGANIZED HEALTH CARE EDUCATION/TRAINING PROGRAM

## 2022-01-01 PROCEDURE — 87077 CULTURE AEROBIC IDENTIFY: CPT | Performed by: INTERNAL MEDICINE

## 2022-01-01 PROCEDURE — 99499 ARTERIAL LINE: ICD-10-PCS | Mod: ,,, | Performed by: INTERNAL MEDICINE

## 2022-01-01 PROCEDURE — 87106 FUNGI IDENTIFICATION YEAST: CPT | Performed by: INTERNAL MEDICINE

## 2022-01-01 PROCEDURE — 94002 VENT MGMT INPAT INIT DAY: CPT

## 2022-01-01 PROCEDURE — 99223 1ST HOSP IP/OBS HIGH 75: CPT | Mod: GC,,, | Performed by: INTERNAL MEDICINE

## 2022-01-01 PROCEDURE — 99291 PR CRITICAL CARE, E/M 30-74 MINUTES: ICD-10-PCS | Mod: ,,, | Performed by: INTERNAL MEDICINE

## 2022-01-01 PROCEDURE — 87070 CULTURE OTHR SPECIMN AEROBIC: CPT | Performed by: STUDENT IN AN ORGANIZED HEALTH CARE EDUCATION/TRAINING PROGRAM

## 2022-01-01 PROCEDURE — 87798 DETECT AGENT NOS DNA AMP: CPT | Performed by: STUDENT IN AN ORGANIZED HEALTH CARE EDUCATION/TRAINING PROGRAM

## 2022-01-01 PROCEDURE — 85025 COMPLETE CBC W/AUTO DIFF WBC: CPT | Performed by: STUDENT IN AN ORGANIZED HEALTH CARE EDUCATION/TRAINING PROGRAM

## 2022-01-01 RX ORDER — ATORVASTATIN CALCIUM 20 MG/1
20 TABLET, FILM COATED ORAL NIGHTLY
Status: DISCONTINUED | OUTPATIENT
Start: 2022-01-01 | End: 2022-01-01 | Stop reason: HOSPADM

## 2022-01-01 RX ORDER — MUPIROCIN 20 MG/G
OINTMENT TOPICAL 2 TIMES DAILY
Status: DISPENSED | OUTPATIENT
Start: 2022-01-01 | End: 2022-01-01

## 2022-01-01 RX ORDER — MORPHINE SULFATE IN 0.9 % NACL 30 MG/30ML
1 PATIENT CONTROLLED ANALGESIA SYRINGE INTRAVENOUS CONTINUOUS
Status: DISCONTINUED | OUTPATIENT
Start: 2022-01-01 | End: 2022-02-15 | Stop reason: HOSPADM

## 2022-01-01 RX ORDER — FUROSEMIDE 10 MG/ML
40 INJECTION INTRAMUSCULAR; INTRAVENOUS ONCE
Status: COMPLETED | OUTPATIENT
Start: 2022-01-01 | End: 2022-01-01

## 2022-01-01 RX ORDER — SCOLOPAMINE TRANSDERMAL SYSTEM 1 MG/1
1 PATCH, EXTENDED RELEASE TRANSDERMAL
Status: DISCONTINUED | OUTPATIENT
Start: 2022-01-01 | End: 2022-02-15 | Stop reason: HOSPADM

## 2022-01-01 RX ORDER — LISINOPRIL 5 MG/1
5 TABLET ORAL DAILY
Status: DISCONTINUED | OUTPATIENT
Start: 2022-01-01 | End: 2022-01-01

## 2022-01-01 RX ORDER — ACETAMINOPHEN 325 MG/1
650 TABLET ORAL EVERY 6 HOURS PRN
Status: DISCONTINUED | OUTPATIENT
Start: 2022-01-01 | End: 2022-01-01

## 2022-01-01 RX ORDER — FUROSEMIDE 10 MG/ML
120 INJECTION INTRAMUSCULAR; INTRAVENOUS ONCE
Status: COMPLETED | OUTPATIENT
Start: 2022-01-01 | End: 2022-01-01

## 2022-01-01 RX ORDER — LIDOCAINE 50 MG/G
1 PATCH TOPICAL DAILY
Qty: 30 PATCH | Refills: 3 | Status: ON HOLD | OUTPATIENT
Start: 2022-01-01 | End: 2022-01-01 | Stop reason: HOSPADM

## 2022-01-01 RX ORDER — TRIAMCINOLONE ACETONIDE 0.25 MG/G
CREAM TOPICAL 2 TIMES DAILY
Qty: 15 G | Refills: 1 | Status: SHIPPED | OUTPATIENT
Start: 2022-01-01 | End: 2022-01-01

## 2022-01-01 RX ORDER — POTASSIUM CHLORIDE 20 MEQ/1
40 TABLET, EXTENDED RELEASE ORAL ONCE
Status: COMPLETED | OUTPATIENT
Start: 2022-01-01 | End: 2022-01-01

## 2022-01-01 RX ORDER — ONDANSETRON 8 MG/1
8 TABLET, ORALLY DISINTEGRATING ORAL EVERY 6 HOURS PRN
Qty: 30 TABLET | Refills: 3 | Status: ON HOLD | OUTPATIENT
Start: 2022-01-01 | End: 2022-01-01 | Stop reason: HOSPADM

## 2022-01-01 RX ORDER — IPRATROPIUM BROMIDE AND ALBUTEROL SULFATE 2.5; .5 MG/3ML; MG/3ML
3 SOLUTION RESPIRATORY (INHALATION) EVERY 4 HOURS
Status: DISCONTINUED | OUTPATIENT
Start: 2022-01-01 | End: 2022-01-01

## 2022-01-01 RX ORDER — MAGNESIUM SULFATE HEPTAHYDRATE 40 MG/ML
2 INJECTION, SOLUTION INTRAVENOUS ONCE
Status: COMPLETED | OUTPATIENT
Start: 2022-01-01 | End: 2022-01-01

## 2022-01-01 RX ORDER — NOREPINEPHRINE BITARTRATE/D5W 4MG/250ML
0-3 PLASTIC BAG, INJECTION (ML) INTRAVENOUS CONTINUOUS
Status: DISCONTINUED | OUTPATIENT
Start: 2022-01-01 | End: 2022-01-01

## 2022-01-01 RX ORDER — WARFARIN 7.5 MG/1
7.5 TABLET ORAL DAILY
Status: DISCONTINUED | OUTPATIENT
Start: 2022-01-01 | End: 2022-01-01

## 2022-01-01 RX ORDER — SODIUM CHLORIDE 9 MG/ML
INJECTION, SOLUTION INTRAVENOUS ONCE
Status: COMPLETED | OUTPATIENT
Start: 2022-01-01 | End: 2022-01-01

## 2022-01-01 RX ORDER — LANOLIN ALCOHOL/MO/W.PET/CERES
800 CREAM (GRAM) TOPICAL 3 TIMES DAILY
Status: DISCONTINUED | OUTPATIENT
Start: 2022-01-01 | End: 2022-01-01 | Stop reason: HOSPADM

## 2022-01-01 RX ORDER — CEFEPIME HYDROCHLORIDE 1 G/50ML
2 INJECTION, SOLUTION INTRAVENOUS
Status: DISCONTINUED | OUTPATIENT
Start: 2022-01-01 | End: 2022-01-01

## 2022-01-01 RX ORDER — COLCHICINE 0.6 MG/1
0.6 CAPSULE ORAL DAILY
Qty: 30 CAPSULE | Refills: 11 | Status: ON HOLD | OUTPATIENT
Start: 2022-01-01 | End: 2022-01-01 | Stop reason: HOSPADM

## 2022-01-01 RX ORDER — IPRATROPIUM BROMIDE AND ALBUTEROL SULFATE 2.5; .5 MG/3ML; MG/3ML
3 SOLUTION RESPIRATORY (INHALATION) EVERY 4 HOURS PRN
Status: DISCONTINUED | OUTPATIENT
Start: 2022-01-01 | End: 2022-01-01 | Stop reason: HOSPADM

## 2022-01-01 RX ORDER — ETOMIDATE 2 MG/ML
INJECTION INTRAVENOUS
Status: DISCONTINUED | OUTPATIENT
Start: 2022-01-01 | End: 2022-01-01

## 2022-01-01 RX ORDER — HYDROCODONE BITARTRATE AND ACETAMINOPHEN 500; 5 MG/1; MG/1
TABLET ORAL
Status: DISCONTINUED | OUTPATIENT
Start: 2022-01-01 | End: 2022-01-01 | Stop reason: HOSPADM

## 2022-01-01 RX ORDER — WARFARIN 7.5 MG/1
TABLET ORAL
Qty: 90 TABLET | Refills: 3 | Status: ON HOLD | OUTPATIENT
Start: 2022-01-01 | End: 2022-01-01 | Stop reason: HOSPADM

## 2022-01-01 RX ORDER — FUROSEMIDE 10 MG/ML
80 INJECTION INTRAMUSCULAR; INTRAVENOUS ONCE
Status: COMPLETED | OUTPATIENT
Start: 2022-01-01 | End: 2022-01-01

## 2022-01-01 RX ORDER — ALBUTEROL SULFATE 90 UG/1
2 AEROSOL, METERED RESPIRATORY (INHALATION) EVERY 6 HOURS PRN
Qty: 18 G | Refills: 3 | Status: ON HOLD | OUTPATIENT
Start: 2022-01-01 | End: 2022-01-01 | Stop reason: HOSPADM

## 2022-01-01 RX ORDER — ALBUTEROL SULFATE 90 UG/1
2 AEROSOL, METERED RESPIRATORY (INHALATION) EVERY 6 HOURS PRN
Qty: 18 G | Refills: 3 | Status: CANCELLED | OUTPATIENT
Start: 2022-01-01

## 2022-01-01 RX ORDER — ONDANSETRON 2 MG/ML
4 INJECTION INTRAMUSCULAR; INTRAVENOUS ONCE
Status: COMPLETED | OUTPATIENT
Start: 2022-01-01 | End: 2022-01-01

## 2022-01-01 RX ORDER — MIDAZOLAM HYDROCHLORIDE 1 MG/ML
INJECTION, SOLUTION INTRAMUSCULAR; INTRAVENOUS
Status: DISCONTINUED | OUTPATIENT
Start: 2022-01-01 | End: 2022-01-01

## 2022-01-01 RX ORDER — FUROSEMIDE 10 MG/ML
40 INJECTION INTRAMUSCULAR; INTRAVENOUS ONCE
Status: DISCONTINUED | OUTPATIENT
Start: 2022-01-01 | End: 2022-01-01

## 2022-01-01 RX ORDER — ACETAMINOPHEN 325 MG/1
650 TABLET ORAL EVERY 6 HOURS PRN
Status: DISCONTINUED | OUTPATIENT
Start: 2022-01-01 | End: 2022-01-01 | Stop reason: HOSPADM

## 2022-01-01 RX ORDER — LANOLIN ALCOHOL/MO/W.PET/CERES
800 CREAM (GRAM) TOPICAL 3 TIMES DAILY
Qty: 180 TABLET | Refills: 11 | Status: ON HOLD | OUTPATIENT
Start: 2022-01-01 | End: 2022-01-01 | Stop reason: HOSPADM

## 2022-01-01 RX ORDER — LORAZEPAM 2 MG/ML
1 INJECTION INTRAMUSCULAR
Status: DISCONTINUED | OUTPATIENT
Start: 2022-01-01 | End: 2022-02-15 | Stop reason: HOSPADM

## 2022-01-01 RX ORDER — GUAIFENESIN 100 MG/5ML
200 SOLUTION ORAL EVERY 4 HOURS PRN
Status: DISCONTINUED | OUTPATIENT
Start: 2022-01-01 | End: 2022-01-01 | Stop reason: HOSPADM

## 2022-01-01 RX ORDER — COLCHICINE 0.6 MG/1
0.6 TABLET, FILM COATED ORAL ONCE
Status: COMPLETED | OUTPATIENT
Start: 2022-01-01 | End: 2022-01-01

## 2022-01-01 RX ORDER — DIPHENHYDRAMINE HCL 25 MG
25 CAPSULE ORAL EVERY 6 HOURS PRN
Status: DISCONTINUED | OUTPATIENT
Start: 2022-01-01 | End: 2022-01-01

## 2022-01-01 RX ORDER — LANOLIN ALCOHOL/MO/W.PET/CERES
400 CREAM (GRAM) TOPICAL ONCE
Status: COMPLETED | OUTPATIENT
Start: 2022-01-01 | End: 2022-01-01

## 2022-01-01 RX ORDER — FENTANYL CITRATE 50 UG/ML
INJECTION, SOLUTION INTRAMUSCULAR; INTRAVENOUS
Status: DISCONTINUED | OUTPATIENT
Start: 2022-01-01 | End: 2022-01-01

## 2022-01-01 RX ORDER — DOXYCYCLINE HYCLATE 100 MG
100 TABLET ORAL EVERY 12 HOURS
Status: DISCONTINUED | OUTPATIENT
Start: 2022-01-01 | End: 2022-01-01

## 2022-01-01 RX ORDER — TRAMADOL HYDROCHLORIDE 50 MG/1
50 TABLET ORAL EVERY 6 HOURS PRN
Status: DISCONTINUED | OUTPATIENT
Start: 2022-01-01 | End: 2022-01-01 | Stop reason: HOSPADM

## 2022-01-01 RX ORDER — PROPOFOL 10 MG/ML
0-50 INJECTION, EMULSION INTRAVENOUS CONTINUOUS
Status: DISCONTINUED | OUTPATIENT
Start: 2022-01-01 | End: 2022-02-15 | Stop reason: HOSPADM

## 2022-01-01 RX ORDER — COLCHICINE 0.6 MG/1
0.6 CAPSULE ORAL DAILY
Qty: 30 CAPSULE | Refills: 11 | Status: SHIPPED | OUTPATIENT
Start: 2022-01-01 | End: 2022-01-01

## 2022-01-01 RX ORDER — POTASSIUM CHLORIDE 7.45 MG/ML
10 INJECTION INTRAVENOUS ONCE
Status: COMPLETED | OUTPATIENT
Start: 2022-01-01 | End: 2022-01-01

## 2022-01-01 RX ORDER — ONDANSETRON 8 MG/1
8 TABLET, ORALLY DISINTEGRATING ORAL EVERY 6 HOURS PRN
Qty: 30 TABLET | Refills: 3 | Status: SHIPPED | OUTPATIENT
Start: 2022-01-01 | End: 2022-01-01 | Stop reason: SDUPTHER

## 2022-01-01 RX ORDER — SPIRONOLACTONE 25 MG/1
25 TABLET ORAL DAILY
Status: DISCONTINUED | OUTPATIENT
Start: 2022-01-01 | End: 2022-01-01

## 2022-01-01 RX ORDER — LIDOCAINE HYDROCHLORIDE 20 MG/ML
INJECTION INTRAVENOUS
Status: DISCONTINUED | OUTPATIENT
Start: 2022-01-01 | End: 2022-01-01

## 2022-01-01 RX ORDER — WARFARIN 7.5 MG/1
TABLET ORAL
Qty: 90 TABLET | Refills: 3 | Status: SHIPPED | OUTPATIENT
Start: 2022-01-01 | End: 2022-01-01 | Stop reason: SDUPTHER

## 2022-01-01 RX ORDER — SPIRONOLACTONE 25 MG/1
50 TABLET ORAL DAILY
Status: DISCONTINUED | OUTPATIENT
Start: 2022-01-01 | End: 2022-01-01

## 2022-01-01 RX ORDER — DOBUTAMINE HYDROCHLORIDE 400 MG/100ML
2.5 INJECTION INTRAVENOUS CONTINUOUS
Status: DISCONTINUED | OUTPATIENT
Start: 2022-01-01 | End: 2022-01-01

## 2022-01-01 RX ORDER — SODIUM CHLORIDE 0.9 % (FLUSH) 0.9 %
10 SYRINGE (ML) INJECTION
Status: DISCONTINUED | OUTPATIENT
Start: 2022-01-01 | End: 2022-02-15 | Stop reason: HOSPADM

## 2022-01-01 RX ORDER — AMLODIPINE BESYLATE 10 MG/1
10 TABLET ORAL DAILY
Status: DISCONTINUED | OUTPATIENT
Start: 2022-01-01 | End: 2022-01-01

## 2022-01-01 RX ORDER — HYDRALAZINE HYDROCHLORIDE 50 MG/1
100 TABLET, FILM COATED ORAL EVERY 8 HOURS
Status: DISCONTINUED | OUTPATIENT
Start: 2022-01-01 | End: 2022-01-01

## 2022-01-01 RX ORDER — DIPHENOXYLATE HYDROCHLORIDE AND ATROPINE SULFATE 2.5; .025 MG/1; MG/1
1 TABLET ORAL 4 TIMES DAILY PRN
Status: DISCONTINUED | OUTPATIENT
Start: 2022-01-01 | End: 2022-01-01 | Stop reason: HOSPADM

## 2022-01-01 RX ORDER — COLCHICINE 0.6 MG/1
0.6 TABLET, FILM COATED ORAL DAILY
Status: DISCONTINUED | OUTPATIENT
Start: 2022-01-01 | End: 2022-01-01 | Stop reason: HOSPADM

## 2022-01-01 RX ORDER — LIDOCAINE 50 MG/G
1 PATCH TOPICAL
Status: DISCONTINUED | OUTPATIENT
Start: 2022-01-01 | End: 2022-01-01 | Stop reason: HOSPADM

## 2022-01-01 RX ORDER — CEFAZOLIN SODIUM 1 G/3ML
INJECTION, POWDER, FOR SOLUTION INTRAMUSCULAR; INTRAVENOUS
Status: DISCONTINUED | OUTPATIENT
Start: 2022-01-01 | End: 2022-01-01

## 2022-01-01 RX ORDER — COLCHICINE 0.6 MG/1
1.2 TABLET, FILM COATED ORAL ONCE
Status: COMPLETED | OUTPATIENT
Start: 2022-01-01 | End: 2022-01-01

## 2022-01-01 RX ORDER — LANOLIN ALCOHOL/MO/W.PET/CERES
400 CREAM (GRAM) TOPICAL 3 TIMES DAILY
Status: DISCONTINUED | OUTPATIENT
Start: 2022-01-01 | End: 2022-01-01

## 2022-01-01 RX ORDER — WARFARIN 2 MG/1
4 TABLET ORAL DAILY
Status: DISCONTINUED | OUTPATIENT
Start: 2022-01-01 | End: 2022-01-01

## 2022-01-01 RX ORDER — SODIUM CHLORIDE 9 MG/ML
INJECTION, SOLUTION INTRAVENOUS CONTINUOUS
Status: ACTIVE | OUTPATIENT
Start: 2022-01-01 | End: 2022-01-01

## 2022-01-01 RX ORDER — POTASSIUM CHLORIDE 20 MEQ/1
60 TABLET, EXTENDED RELEASE ORAL ONCE
Status: COMPLETED | OUTPATIENT
Start: 2022-01-01 | End: 2022-01-01

## 2022-01-01 RX ORDER — LIDOCAINE 50 MG/G
1 PATCH TOPICAL DAILY
Qty: 30 PATCH | Refills: 3 | Status: SHIPPED | OUTPATIENT
Start: 2022-01-01 | End: 2022-01-01

## 2022-01-01 RX ORDER — BENZONATATE 100 MG/1
100 CAPSULE ORAL 3 TIMES DAILY PRN
Status: DISCONTINUED | OUTPATIENT
Start: 2022-01-01 | End: 2022-01-01 | Stop reason: HOSPADM

## 2022-01-01 RX ORDER — COLCHICINE 0.6 MG/1
1.2 TABLET, FILM COATED ORAL DAILY
Status: DISCONTINUED | OUTPATIENT
Start: 2022-01-01 | End: 2022-01-01

## 2022-01-01 RX ORDER — TRIAMCINOLONE ACETONIDE 0.25 MG/G
CREAM TOPICAL 2 TIMES DAILY
Qty: 15 G | Refills: 1 | Status: ON HOLD | OUTPATIENT
Start: 2022-01-01 | End: 2022-01-01 | Stop reason: HOSPADM

## 2022-01-01 RX ORDER — ALUMINUM HYDROXIDE, MAGNESIUM HYDROXIDE, AND SIMETHICONE 2400; 240; 2400 MG/30ML; MG/30ML; MG/30ML
30 SUSPENSION ORAL EVERY 6 HOURS PRN
Status: DISCONTINUED | OUTPATIENT
Start: 2022-01-01 | End: 2022-01-01 | Stop reason: HOSPADM

## 2022-01-01 RX ORDER — ACETAMINOPHEN 325 MG/1
650 TABLET ORAL ONCE
Status: COMPLETED | OUTPATIENT
Start: 2022-01-01 | End: 2022-01-01

## 2022-01-01 RX ORDER — DIPHENHYDRAMINE HYDROCHLORIDE 50 MG/ML
25 INJECTION INTRAMUSCULAR; INTRAVENOUS EVERY 6 HOURS PRN
Status: DISCONTINUED | OUTPATIENT
Start: 2022-01-01 | End: 2022-01-01 | Stop reason: HOSPADM

## 2022-01-01 RX ORDER — ATORVASTATIN CALCIUM 20 MG/1
20 TABLET, FILM COATED ORAL NIGHTLY
Status: DISCONTINUED | OUTPATIENT
Start: 2022-01-01 | End: 2022-01-01

## 2022-01-01 RX ORDER — TRIAMCINOLONE ACETONIDE 0.25 MG/G
CREAM TOPICAL 2 TIMES DAILY
Status: DISCONTINUED | OUTPATIENT
Start: 2022-01-01 | End: 2022-01-01 | Stop reason: HOSPADM

## 2022-01-01 RX ORDER — PROPOFOL 10 MG/ML
VIAL (ML) INTRAVENOUS CONTINUOUS PRN
Status: DISCONTINUED | OUTPATIENT
Start: 2022-01-01 | End: 2022-01-01

## 2022-01-01 RX ORDER — WARFARIN 3 MG/1
3 TABLET ORAL DAILY
Status: DISCONTINUED | OUTPATIENT
Start: 2022-01-01 | End: 2022-01-01 | Stop reason: HOSPADM

## 2022-01-01 RX ORDER — FUROSEMIDE 10 MG/ML
INJECTION INTRAMUSCULAR; INTRAVENOUS
Status: DISPENSED
Start: 2022-01-01 | End: 2022-01-01

## 2022-01-01 RX ORDER — WARFARIN 2.5 MG/1
2.5 TABLET ORAL DAILY
Status: DISCONTINUED | OUTPATIENT
Start: 2022-01-01 | End: 2022-01-01

## 2022-01-01 RX ORDER — ONDANSETRON 2 MG/ML
4 INJECTION INTRAMUSCULAR; INTRAVENOUS EVERY 6 HOURS PRN
Status: DISCONTINUED | OUTPATIENT
Start: 2022-01-01 | End: 2022-01-01 | Stop reason: HOSPADM

## 2022-01-01 RX ORDER — LISINOPRIL 5 MG/1
5 TABLET ORAL DAILY
Status: DISCONTINUED | OUTPATIENT
Start: 2022-01-01 | End: 2022-01-01 | Stop reason: HOSPADM

## 2022-01-01 RX ORDER — DIPHENHYDRAMINE HCL 50 MG
50 CAPSULE ORAL ONCE
Status: COMPLETED | OUTPATIENT
Start: 2022-01-01 | End: 2022-01-01

## 2022-01-01 RX ORDER — DOCUSATE SODIUM 100 MG/1
100 CAPSULE, LIQUID FILLED ORAL DAILY
Status: DISCONTINUED | OUTPATIENT
Start: 2022-01-01 | End: 2022-01-01 | Stop reason: HOSPADM

## 2022-01-01 RX ORDER — LANOLIN ALCOHOL/MO/W.PET/CERES
400 CREAM (GRAM) TOPICAL 2 TIMES DAILY
Status: DISCONTINUED | OUTPATIENT
Start: 2022-01-01 | End: 2022-01-01

## 2022-01-01 RX ADMIN — IPRATROPIUM BROMIDE AND ALBUTEROL SULFATE 3 ML: 2.5; .5 SOLUTION RESPIRATORY (INHALATION) at 04:01

## 2022-01-01 RX ADMIN — ATORVASTATIN CALCIUM 20 MG: 20 TABLET, FILM COATED ORAL at 09:01

## 2022-01-01 RX ADMIN — CEFEPIME 2 G: 2 INJECTION, POWDER, FOR SOLUTION INTRAVENOUS at 05:01

## 2022-01-01 RX ADMIN — MEROPENEM 2 G: 1 INJECTION INTRAVENOUS at 02:01

## 2022-01-01 RX ADMIN — DAPTOMYCIN 900 MG: 350 INJECTION, POWDER, LYOPHILIZED, FOR SOLUTION INTRAVENOUS at 09:01

## 2022-01-01 RX ADMIN — LIDOCAINE 5% 1 PATCH: 700 PATCH TOPICAL at 02:01

## 2022-01-01 RX ADMIN — DIPHENHYDRAMINE HYDROCHLORIDE 25 MG: 25 CAPSULE ORAL at 09:01

## 2022-01-01 RX ADMIN — PIPERACILLIN SODIUM AND TAZOBACTAM SODIUM 4.5 G: 4; .5 INJECTION, POWDER, FOR SOLUTION INTRAVENOUS at 10:01

## 2022-01-01 RX ADMIN — IPRATROPIUM BROMIDE AND ALBUTEROL SULFATE 3 ML: 2.5; .5 SOLUTION RESPIRATORY (INHALATION) at 12:01

## 2022-01-01 RX ADMIN — MEROPENEM 2 G: 1 INJECTION INTRAVENOUS at 05:01

## 2022-01-01 RX ADMIN — TRIAMCINOLONE ACETONIDE: 0.25 CREAM TOPICAL at 09:01

## 2022-01-01 RX ADMIN — PIPERACILLIN SODIUM AND TAZOBACTAM SODIUM 4.5 G: 4; .5 INJECTION, POWDER, FOR SOLUTION INTRAVENOUS at 01:01

## 2022-01-01 RX ADMIN — Medication 800 MG: at 04:01

## 2022-01-01 RX ADMIN — PIPERACILLIN SODIUM AND TAZOBACTAM SODIUM 4.5 G: 4; .5 INJECTION, POWDER, FOR SOLUTION INTRAVENOUS at 04:01

## 2022-01-01 RX ADMIN — DIPHENHYDRAMINE HYDROCHLORIDE 25 MG: 50 INJECTION, SOLUTION INTRAMUSCULAR; INTRAVENOUS at 05:01

## 2022-01-01 RX ADMIN — CEFEPIME 2 G: 2 INJECTION, POWDER, FOR SOLUTION INTRAVENOUS at 09:01

## 2022-01-01 RX ADMIN — ACETAMINOPHEN 650 MG: 325 TABLET ORAL at 10:01

## 2022-01-01 RX ADMIN — WARFARIN SODIUM 4 MG: 2 TABLET ORAL at 04:01

## 2022-01-01 RX ADMIN — ATORVASTATIN CALCIUM 20 MG: 20 TABLET, FILM COATED ORAL at 08:01

## 2022-01-01 RX ADMIN — Medication 400 MG: at 08:01

## 2022-01-01 RX ADMIN — LISINOPRIL 5 MG: 5 TABLET ORAL at 09:01

## 2022-01-01 RX ADMIN — WARFARIN SODIUM 7.5 MG: 7.5 TABLET ORAL at 06:01

## 2022-01-01 RX ADMIN — ONDANSETRON 4 MG: 2 INJECTION INTRAMUSCULAR; INTRAVENOUS at 05:01

## 2022-01-01 RX ADMIN — WARFARIN SODIUM 3 MG: 3 TABLET ORAL at 04:01

## 2022-01-01 RX ADMIN — TRIAMCINOLONE ACETONIDE: 0.25 CREAM TOPICAL at 10:01

## 2022-01-01 RX ADMIN — TRAMADOL HYDROCHLORIDE 50 MG: 50 TABLET, COATED ORAL at 04:01

## 2022-01-01 RX ADMIN — DAPTOMYCIN 720 MG: 350 INJECTION, POWDER, LYOPHILIZED, FOR SOLUTION INTRAVENOUS at 02:01

## 2022-01-01 RX ADMIN — DIPHENHYDRAMINE HYDROCHLORIDE 25 MG: 50 INJECTION, SOLUTION INTRAMUSCULAR; INTRAVENOUS at 09:01

## 2022-01-01 RX ADMIN — Medication 400 MG: at 10:01

## 2022-01-01 RX ADMIN — DIPHENHYDRAMINE HYDROCHLORIDE 25 MG: 25 CAPSULE ORAL at 05:01

## 2022-01-01 RX ADMIN — Medication 400 MG: at 09:01

## 2022-01-01 RX ADMIN — PHYTONADIONE 2 MG: 10 INJECTION, EMULSION INTRAMUSCULAR; INTRAVENOUS; SUBCUTANEOUS at 09:01

## 2022-01-01 RX ADMIN — COLCHICINE 1.2 MG: 0.6 CAPSULE ORAL at 11:01

## 2022-01-01 RX ADMIN — TRIAMCINOLONE ACETONIDE: 0.25 CREAM TOPICAL at 08:01

## 2022-01-01 RX ADMIN — DAPTOMYCIN 900 MG: 350 INJECTION, POWDER, LYOPHILIZED, FOR SOLUTION INTRAVENOUS at 11:01

## 2022-01-01 RX ADMIN — Medication 400 MG: at 07:01

## 2022-01-01 RX ADMIN — DAPTOMYCIN 900 MG: 350 INJECTION, POWDER, LYOPHILIZED, FOR SOLUTION INTRAVENOUS at 05:01

## 2022-01-01 RX ADMIN — Medication 800 MG: at 03:01

## 2022-01-01 RX ADMIN — CEFEPIME 2 G: 2 INJECTION, POWDER, FOR SOLUTION INTRAVENOUS at 02:01

## 2022-01-01 RX ADMIN — IPRATROPIUM BROMIDE AND ALBUTEROL SULFATE 3 ML: 2.5; .5 SOLUTION RESPIRATORY (INHALATION) at 08:01

## 2022-01-01 RX ADMIN — Medication 800 MG: at 09:01

## 2022-01-01 RX ADMIN — IPRATROPIUM BROMIDE AND ALBUTEROL SULFATE 3 ML: 2.5; .5 SOLUTION RESPIRATORY (INHALATION) at 09:01

## 2022-01-01 RX ADMIN — HYDRALAZINE HYDROCHLORIDE 100 MG: 50 TABLET ORAL at 05:01

## 2022-01-01 RX ADMIN — DIPHENHYDRAMINE HYDROCHLORIDE 25 MG: 25 CAPSULE ORAL at 03:01

## 2022-01-01 RX ADMIN — SODIUM CHLORIDE: 0.9 INJECTION, SOLUTION INTRAVENOUS at 03:01

## 2022-01-01 RX ADMIN — Medication 800 MG: at 02:01

## 2022-01-01 RX ADMIN — ACETAMINOPHEN 650 MG: 325 TABLET ORAL at 04:01

## 2022-01-01 RX ADMIN — DOCUSATE SODIUM 100 MG: 100 CAPSULE ORAL at 10:01

## 2022-01-01 RX ADMIN — PIPERACILLIN SODIUM AND TAZOBACTAM SODIUM 4.5 G: 4; .5 INJECTION, POWDER, FOR SOLUTION INTRAVENOUS at 12:01

## 2022-01-01 RX ADMIN — WARFARIN SODIUM 4 MG: 2 TABLET ORAL at 05:01

## 2022-01-01 RX ADMIN — PIPERACILLIN SODIUM AND TAZOBACTAM SODIUM 4.5 G: 4; .5 INJECTION, POWDER, FOR SOLUTION INTRAVENOUS at 05:01

## 2022-01-01 RX ADMIN — Medication 1 CAPSULE: at 08:01

## 2022-01-01 RX ADMIN — Medication 400 MG: at 03:01

## 2022-01-01 RX ADMIN — DIPHENHYDRAMINE HYDROCHLORIDE 25 MG: 50 INJECTION, SOLUTION INTRAMUSCULAR; INTRAVENOUS at 10:01

## 2022-01-01 RX ADMIN — FUROSEMIDE 40 MG/HR: 10 INJECTION, SOLUTION INTRAVENOUS at 11:02

## 2022-01-01 RX ADMIN — PIPERACILLIN SODIUM AND TAZOBACTAM SODIUM 4.5 G: 4; .5 INJECTION, POWDER, FOR SOLUTION INTRAVENOUS at 08:01

## 2022-01-01 RX ADMIN — PIPERACILLIN SODIUM AND TAZOBACTAM SODIUM 4.5 G: 4; .5 INJECTION, POWDER, FOR SOLUTION INTRAVENOUS at 03:01

## 2022-01-01 RX ADMIN — DAPTOMYCIN 900 MG: 350 INJECTION, POWDER, LYOPHILIZED, FOR SOLUTION INTRAVENOUS at 01:01

## 2022-01-01 RX ADMIN — SPIRONOLACTONE 50 MG: 25 TABLET ORAL at 10:01

## 2022-01-01 RX ADMIN — DAPTOMYCIN 900 MG: 350 INJECTION, POWDER, LYOPHILIZED, FOR SOLUTION INTRAVENOUS at 12:01

## 2022-01-01 RX ADMIN — SODIUM CHLORIDE: 0.9 INJECTION, SOLUTION INTRAVENOUS at 06:01

## 2022-01-01 RX ADMIN — POTASSIUM BICARBONATE 20 MEQ: 391 TABLET, EFFERVESCENT ORAL at 12:01

## 2022-01-01 RX ADMIN — COLCHICINE 0.6 MG: 0.6 CAPSULE ORAL at 09:01

## 2022-01-01 RX ADMIN — SPIRONOLACTONE 25 MG: 25 TABLET, FILM COATED ORAL at 09:01

## 2022-01-01 RX ADMIN — Medication 1 CAPSULE: at 09:01

## 2022-01-01 RX ADMIN — POTASSIUM CHLORIDE 10 MEQ: 7.46 INJECTION, SOLUTION INTRAVENOUS at 03:01

## 2022-01-01 RX ADMIN — Medication 1 CAPSULE: at 10:01

## 2022-01-01 RX ADMIN — Medication 800 MG: at 08:01

## 2022-01-01 RX ADMIN — IPRATROPIUM BROMIDE AND ALBUTEROL SULFATE 3 ML: 2.5; .5 SOLUTION RESPIRATORY (INHALATION) at 07:01

## 2022-01-01 RX ADMIN — LISINOPRIL 5 MG: 5 TABLET ORAL at 10:01

## 2022-01-01 RX ADMIN — LISINOPRIL 5 MG: 5 TABLET ORAL at 08:01

## 2022-01-01 RX ADMIN — CEFEPIME 2 G: 2 INJECTION, POWDER, FOR SOLUTION INTRAVENOUS at 10:01

## 2022-01-01 RX ADMIN — ONDANSETRON 4 MG: 2 INJECTION INTRAMUSCULAR; INTRAVENOUS at 09:01

## 2022-01-01 RX ADMIN — BENZONATATE 100 MG: 100 CAPSULE ORAL at 04:01

## 2022-01-01 RX ADMIN — ACETAMINOPHEN 650 MG: 325 TABLET ORAL at 08:01

## 2022-01-01 RX ADMIN — MUPIROCIN: 20 OINTMENT TOPICAL at 08:01

## 2022-01-01 RX ADMIN — MEROPENEM 2 G: 1 INJECTION INTRAVENOUS at 01:01

## 2022-01-01 RX ADMIN — PIPERACILLIN SODIUM AND TAZOBACTAM SODIUM 4.5 G: 4; .5 INJECTION, POWDER, FOR SOLUTION INTRAVENOUS at 06:01

## 2022-01-01 RX ADMIN — COLCHICINE 0.6 MG: 0.6 CAPSULE ORAL at 01:01

## 2022-01-01 RX ADMIN — DAPTOMYCIN 900 MG: 350 INJECTION, POWDER, LYOPHILIZED, FOR SOLUTION INTRAVENOUS at 06:01

## 2022-01-01 RX ADMIN — DOCUSATE SODIUM 100 MG: 100 CAPSULE ORAL at 08:01

## 2022-01-01 RX ADMIN — IPRATROPIUM BROMIDE AND ALBUTEROL SULFATE 3 ML: 2.5; .5 SOLUTION RESPIRATORY (INHALATION) at 03:01

## 2022-01-01 RX ADMIN — POTASSIUM CHLORIDE 40 MEQ: 1500 TABLET, EXTENDED RELEASE ORAL at 02:01

## 2022-01-01 RX ADMIN — POTASSIUM CHLORIDE 60 MEQ: 1500 TABLET, EXTENDED RELEASE ORAL at 06:01

## 2022-01-01 RX ADMIN — COLCHICINE 1.2 MG: 0.6 CAPSULE ORAL at 09:01

## 2022-01-01 RX ADMIN — DAPTOMYCIN 900 MG: 350 INJECTION, POWDER, LYOPHILIZED, FOR SOLUTION INTRAVENOUS at 10:01

## 2022-01-01 RX ADMIN — MUPIROCIN: 20 OINTMENT TOPICAL at 09:01

## 2022-01-01 RX ADMIN — ONDANSETRON 4 MG: 2 INJECTION INTRAMUSCULAR; INTRAVENOUS at 12:01

## 2022-01-01 RX ADMIN — LIDOCAINE HYDROCHLORIDE 60 MG: 20 INJECTION, SOLUTION INTRAVENOUS at 12:01

## 2022-01-01 RX ADMIN — DIPHENHYDRAMINE HYDROCHLORIDE 25 MG: 50 INJECTION, SOLUTION INTRAMUSCULAR; INTRAVENOUS at 11:01

## 2022-01-01 RX ADMIN — MEROPENEM 2 G: 1 INJECTION INTRAVENOUS at 09:01

## 2022-01-01 RX ADMIN — PIPERACILLIN SODIUM AND TAZOBACTAM SODIUM 4.5 G: 4; .5 INJECTION, POWDER, FOR SOLUTION INTRAVENOUS at 09:01

## 2022-01-01 RX ADMIN — TRAMADOL HYDROCHLORIDE 50 MG: 50 TABLET, COATED ORAL at 11:01

## 2022-01-01 RX ADMIN — ACETAMINOPHEN 650 MG: 325 TABLET ORAL at 01:01

## 2022-01-01 RX ADMIN — MIDAZOLAM HYDROCHLORIDE 1 MG: 1 INJECTION, SOLUTION INTRAMUSCULAR; INTRAVENOUS at 12:01

## 2022-01-01 RX ADMIN — DAPTOMYCIN 900 MG: 350 INJECTION, POWDER, LYOPHILIZED, FOR SOLUTION INTRAVENOUS at 02:01

## 2022-01-01 RX ADMIN — DAPTOMYCIN 900 MG: 350 INJECTION, POWDER, LYOPHILIZED, FOR SOLUTION INTRAVENOUS at 08:01

## 2022-01-01 RX ADMIN — AMLODIPINE BESYLATE 10 MG: 10 TABLET ORAL at 10:01

## 2022-01-01 RX ADMIN — ACETAMINOPHEN 650 MG: 325 TABLET ORAL at 05:01

## 2022-01-01 RX ADMIN — DIPHENHYDRAMINE HYDROCHLORIDE 25 MG: 25 CAPSULE ORAL at 12:01

## 2022-01-01 RX ADMIN — CEFEPIME 2 G: 2 INJECTION, POWDER, FOR SOLUTION INTRAVENOUS at 08:01

## 2022-01-01 RX ADMIN — CEFEPIME 2 G: 2 INJECTION, POWDER, FOR SOLUTION INTRAVENOUS at 06:01

## 2022-01-01 RX ADMIN — IPRATROPIUM BROMIDE AND ALBUTEROL SULFATE 3 ML: .5; 3 SOLUTION RESPIRATORY (INHALATION) at 04:01

## 2022-01-01 RX ADMIN — MEROPENEM 2 G: 1 INJECTION INTRAVENOUS at 06:01

## 2022-01-01 RX ADMIN — WARFARIN SODIUM 2.5 MG: 2.5 TABLET ORAL at 05:01

## 2022-01-01 RX ADMIN — LIDOCAINE 5% 1 PATCH: 700 PATCH TOPICAL at 01:01

## 2022-01-01 RX ADMIN — TRIAMCINOLONE ACETONIDE: 0.25 CREAM TOPICAL at 11:01

## 2022-01-01 RX ADMIN — WARFARIN SODIUM 4 MG: 2 TABLET ORAL at 06:01

## 2022-01-01 RX ADMIN — SPIRONOLACTONE 50 MG: 25 TABLET ORAL at 08:01

## 2022-01-01 RX ADMIN — MAGNESIUM SULFATE 2 G: 2 INJECTION INTRAVENOUS at 12:01

## 2022-01-01 RX ADMIN — COLCHICINE 0.6 MG: 0.6 CAPSULE ORAL at 08:01

## 2022-01-01 RX ADMIN — PIPERACILLIN AND TAZOBACTAM 4.5 G: 4; .5 INJECTION, POWDER, LYOPHILIZED, FOR SOLUTION INTRAVENOUS; PARENTERAL at 07:02

## 2022-01-01 RX ADMIN — DIPHENHYDRAMINE HYDROCHLORIDE 25 MG: 25 CAPSULE ORAL at 06:01

## 2022-01-01 RX ADMIN — Medication 800 MG: at 10:01

## 2022-01-01 RX ADMIN — PIPERACILLIN SODIUM AND TAZOBACTAM SODIUM 4.5 G: 4; .5 INJECTION, POWDER, FOR SOLUTION INTRAVENOUS at 02:01

## 2022-01-01 RX ADMIN — ACETAMINOPHEN 650 MG: 325 TABLET ORAL at 06:01

## 2022-01-01 RX ADMIN — POTASSIUM CHLORIDE 10 MEQ: 7.46 INJECTION, SOLUTION INTRAVENOUS at 06:01

## 2022-01-01 RX ADMIN — BENZONATATE 100 MG: 100 CAPSULE ORAL at 11:01

## 2022-01-01 RX ADMIN — DIPHENHYDRAMINE HYDROCHLORIDE 25 MG: 50 INJECTION, SOLUTION INTRAMUSCULAR; INTRAVENOUS at 04:01

## 2022-01-01 RX ADMIN — BENZONATATE 100 MG: 100 CAPSULE ORAL at 06:01

## 2022-01-01 RX ADMIN — DIPHENHYDRAMINE HYDROCHLORIDE 25 MG: 25 CAPSULE ORAL at 02:01

## 2022-01-01 RX ADMIN — PROPOFOL 0 MCG/KG/MIN: 10 INJECTION, EMULSION INTRAVENOUS at 08:02

## 2022-01-01 RX ADMIN — ATORVASTATIN CALCIUM 20 MG: 20 TABLET, FILM COATED ORAL at 07:01

## 2022-01-01 RX ADMIN — Medication 400 MG: at 02:01

## 2022-01-01 RX ADMIN — POTASSIUM BICARBONATE 40 MEQ: 391 TABLET, EFFERVESCENT ORAL at 01:01

## 2022-01-01 RX ADMIN — IPRATROPIUM BROMIDE AND ALBUTEROL SULFATE 3 ML: 2.5; .5 SOLUTION RESPIRATORY (INHALATION) at 05:01

## 2022-01-01 RX ADMIN — SCOPALAMINE 1 PATCH: 1 PATCH, EXTENDED RELEASE TRANSDERMAL at 12:02

## 2022-01-01 RX ADMIN — ONDANSETRON 4 MG: 2 INJECTION INTRAMUSCULAR; INTRAVENOUS at 03:01

## 2022-01-01 RX ADMIN — ACETAMINOPHEN 650 MG: 325 TABLET ORAL at 12:01

## 2022-01-01 RX ADMIN — CEFEPIME 2 G: 2 INJECTION, POWDER, FOR SOLUTION INTRAVENOUS at 11:01

## 2022-01-01 RX ADMIN — CEFAZOLIN 2 G: 330 INJECTION, POWDER, FOR SOLUTION INTRAMUSCULAR; INTRAVENOUS at 12:01

## 2022-01-01 RX ADMIN — GUAIFENESIN 200 MG: 100 SOLUTION ORAL at 08:01

## 2022-01-01 RX ADMIN — PHYTONADIONE 1 MG: 2 INJECTION, EMULSION INTRAMUSCULAR; INTRAVENOUS; SUBCUTANEOUS at 11:01

## 2022-01-01 RX ADMIN — PROPOFOL 75 MCG/KG/MIN: 10 INJECTION, EMULSION INTRAVENOUS at 12:01

## 2022-01-01 RX ADMIN — FUROSEMIDE 80 MG: 10 INJECTION INTRAMUSCULAR; INTRAVENOUS at 05:02

## 2022-01-01 RX ADMIN — LORAZEPAM 1 MG: 2 INJECTION INTRAMUSCULAR; INTRAVENOUS at 11:02

## 2022-01-01 RX ADMIN — Medication 800 MG: at 05:01

## 2022-01-01 RX ADMIN — DAPTOMYCIN 725 MG: 350 INJECTION, POWDER, LYOPHILIZED, FOR SOLUTION INTRAVENOUS at 08:02

## 2022-01-01 RX ADMIN — CEFEPIME 2 G: 2 INJECTION, POWDER, FOR SOLUTION INTRAVENOUS at 12:01

## 2022-01-01 RX ADMIN — FUROSEMIDE 40 MG: 10 INJECTION, SOLUTION INTRAMUSCULAR; INTRAVENOUS at 05:01

## 2022-01-01 RX ADMIN — IPRATROPIUM BROMIDE AND ALBUTEROL SULFATE 3 ML: 2.5; .5 SOLUTION RESPIRATORY (INHALATION) at 06:01

## 2022-01-01 RX ADMIN — Medication 1 MG/HR: at 12:02

## 2022-01-01 RX ADMIN — BENZONATATE 100 MG: 100 CAPSULE ORAL at 09:01

## 2022-01-01 RX ADMIN — TRAMADOL HYDROCHLORIDE 50 MG: 50 TABLET, COATED ORAL at 09:01

## 2022-01-01 RX ADMIN — DAPTOMYCIN 720 MG: 350 INJECTION, POWDER, LYOPHILIZED, FOR SOLUTION INTRAVENOUS at 12:01

## 2022-01-01 RX ADMIN — ETOMIDATE 6 MG: 2 INJECTION, SOLUTION INTRAVENOUS at 01:01

## 2022-01-01 RX ADMIN — WARFARIN SODIUM 7.5 MG: 7.5 TABLET ORAL at 04:01

## 2022-01-01 RX ADMIN — DIPHENHYDRAMINE HYDROCHLORIDE 25 MG: 50 INJECTION, SOLUTION INTRAMUSCULAR; INTRAVENOUS at 06:01

## 2022-01-01 RX ADMIN — LISINOPRIL 5 MG: 5 TABLET ORAL at 05:01

## 2022-01-01 RX ADMIN — CEFEPIME 2 G: 2 INJECTION, POWDER, FOR SOLUTION INTRAVENOUS at 01:01

## 2022-01-01 RX ADMIN — FENTANYL CITRATE 25 MCG: 50 INJECTION, SOLUTION INTRAMUSCULAR; INTRAVENOUS at 01:01

## 2022-01-01 RX ADMIN — BENZONATATE 100 MG: 100 CAPSULE ORAL at 08:01

## 2022-01-01 RX ADMIN — MEROPENEM 2 G: 1 INJECTION INTRAVENOUS at 10:01

## 2022-01-01 RX ADMIN — TRIAMCINOLONE ACETONIDE: 0.25 CREAM TOPICAL at 12:01

## 2022-01-01 RX ADMIN — ALUMINUM HYDROXIDE, MAGNESIUM HYDROXIDE, AND DIMETHICONE 30 ML: 400; 400; 40 SUSPENSION ORAL at 03:01

## 2022-01-01 RX ADMIN — DIPHENHYDRAMINE HYDROCHLORIDE 50 MG: 50 CAPSULE ORAL at 11:01

## 2022-01-01 RX ADMIN — ACETAMINOPHEN 650 MG: 325 TABLET ORAL at 09:01

## 2022-01-01 RX ADMIN — Medication 1 CAPSULE: at 11:01

## 2022-01-01 RX ADMIN — FENTANYL CITRATE 25 MCG: 50 INJECTION, SOLUTION INTRAMUSCULAR; INTRAVENOUS at 12:01

## 2022-01-01 RX ADMIN — CEFEPIME 2 G: 2 INJECTION, POWDER, FOR SOLUTION INTRAVENOUS at 03:01

## 2022-01-01 RX ADMIN — DOBUTAMINE HYDROCHLORIDE 2.5 MCG/KG/MIN: 400 INJECTION INTRAVENOUS at 06:02

## 2022-01-01 RX ADMIN — FUROSEMIDE 120 MG: 10 INJECTION INTRAMUSCULAR; INTRAVENOUS at 10:02

## 2022-01-01 RX ADMIN — BENZONATATE 100 MG: 100 CAPSULE ORAL at 10:01

## 2022-01-01 NOTE — PLAN OF CARE
Cardiac ICU Care Plan    Transfer from [facility] this shift. Febrile since transfer, improved with acetaminophen. BC x 2 pending. ID consulted. Potassium replacement current with plan to diurese after. COVID test sent and awaiting results. + cough.     POC reviewed with Saba ROWE Oren and family. Questions and concerns addressed. No acute events today. Pt progressing toward goals. Will continue to monitor. See below and flowsheets for full assessment and VS info.       Neuro:  [place] Coma Scale  Best Eye Response: 4-->(E4) spontaneous  Best Motor Response: 6-->(M6) obeys commands  Best Verbal Response: 5-->(V5) oriented  [place] Coma Scale Score: 15  Assessment Qualifiers: patient not sedated/intubated,no eye obstruction present       24 hr Temp:  [98.2 °F (36.8 °C)-102.6 °F (39.2 °C)]      CV:  Rhythm: sinus tachycardia,other (see comments) (LVAD artifact)   DVT prophylaxis: VTE Required Core Measure: Pharmacological prophylaxis initiated/maintained                    Type: HeartMate3       Pulses  Right Radial Pulse: Doppler  Left Radial Pulse: Doppler    Resp:  O2 Device (Oxygen Therapy): room air  Flow (L/min): 2       GI/:  GI prophylaxis: yes  Diet/Nutrition Received: 2 gram sodium,low saturated fat/low cholesterol  Last Bowel Movement: 12/31/21      Intake/Output Summary (Last 24 hours) at 1/1/2022 0645  Last data filed at 1/1/2022 0600  Gross per 24 hour   Intake 390.05 ml   Output 250 ml   Net 140.05 ml          Labs/Accuchecks:  Recent Labs   Lab 01/01/22  0020   WBC 20.18*   RBC 5.01   HGB 8.9*   HCT 31.1*   *      Recent Labs   Lab 12/28/21  0000 01/01/22  0020   INR 2.36 2.3*   APTT  --  40.0*      Recent Labs     01/01/22  0020   *   K 2.9*   CO2 33*   CL 86*   BUN 7   CREATININE 0.9   ALKPHOS 106   ALT 11   AST 17   BILITOT 1.2*       Recent Labs   Lab 01/01/22  0020   TROPONINI 0.837*    No results for input(s): PH, PCO2, PO2, HCO3, POCSATURATED, BE in the last 72 hours.    Electrolytes:  Electrolytes replaced  Accuchecks: none    Gtts/LDAs:       Lines/Drains/Airways       Line                   VAD 02/06/20 1047 Left ventricular assist device HeartMate 3 694 days              Peripheral Intravenous Line                   Peripheral IV - Single Lumen 01/01/22 0319 22 G Distal;Posterior;Right Forearm <1 day         Peripheral IV - Single Lumen 01/01/22 0449 20 G Anterior;Left Forearm <1 day                    Skin/Wounds     Wounds: Yes  Wound care consulted: Yes    Problem: Infection (Ventricular Assist Device)  Goal: Absence of Infection Signs and Symptoms  Outcome: Ongoing, Progressing

## 2022-01-01 NOTE — HPI
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 Patient is short of breath and uncomfortable at the time of giving history. He reports chills, weakness, cough, sputum production, weight gain, nausea, vomiting and trouble breathing. He went to his PCP office yesterday where his temp was 103 and was sent to the hospital afterwards. His white count is 20 on arrival. He is unable to lay flat. His HR is 102-142 sinus. He needs to be diuresed upon arrival but K is 2.9, ordered IV and PO Kcl, he vomited out oral potassium, IV replacement will be done.
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
## 2022-01-01 NOTE — ASSESSMENT & PLAN NOTE
Symptoms: cough, chills, sputum production, nausea, vomiting x 1, weakness  Temp 103 yesterday, currently afebrile  HR 120s-140s sinus, WBC 20 on arrival, lactate normal  DLES looks clean with no pus, CXR reports only mild pulmonary edema    Plan  -Vanc and Cefepime (got started 1st dose of Vanc PTA at outside facility which was finished here)  -BCx x 2, UA with relfex culture (BCx before vanc obtained in OSH, cultures in our facility were after Vanc)

## 2022-01-01 NOTE — PLAN OF CARE
Sepsis and ADHF  Transfer to ICU on arrival    Patient is SOB with difficulty finishing sentences due to dyspnea, HR 120s-140s sinus, Temp 103 yesterday at PCP office, afebrile at this time. WBC 20 from normal baseline, coughing with sputum production, chills. He reports weight gain, can't see JVP with BMI 32.9. Also has nausea with 1 episode of emesis here at [facility].     Appears to have sepsis (source of infection not known at this time) and ADHF, however, K is 2.9. Gave him po KCl 40 after 30 min he vomited it out.     Plan  -broad spectrum antibiotics (Vanc, Cefepime)  -diuresis ONCE K is corrected   -replace K via IV route    Will hold off on TLC for now. D/w Dr Tafoya  H&P to follow

## 2022-01-01 NOTE — ASSESSMENT & PLAN NOTE
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 COVID negative. CXR without consolidations. Source unclear, as symptoms are nonspecific. However, would cover broadly for now.     Recommendations:  --switch vanc to dapto (ordered) given hx of VISA  --continue cefepime for now  --f/u BCX

## 2022-01-01 NOTE — ASSESSMENT & PLAN NOTE
Procedure: Device Interrogation Including analysis of device parameters  Current Settings: Ventricular Assist Device  Review of device function is stable    INR goal: 1.5-2.0  Current INR: 2.3 and above goal, Hold coumadin    TXP LVAD INTERROGATIONS 1/1/2022 12/31/2021 12/2/2021 10/19/2021 10/11/2021 10/11/2021 10/11/2021   Type HeartMate3 HeartMate3 - HeartMate3 HeartMate3 HeartMate3 HeartMate3   Flow 4.5 4.6 - 4.2 4.9 4.7 4.8   Speed 5300 5300 - 5300 5300 5300 5300   PI 3.4 3.1 - 3.7 2.8 3.2 3.1   Power (Centeno) 3.9 3.9 - 3.9 3.9 3.8 3.8   LSL 4900 4900 - - - 4900 -   Pulsatility No Pulse No Pulse No Pulse - Intermittent pulse Intermittent pulse Pulse

## 2022-01-01 NOTE — HPI
Mr. 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 Pt reports feeling weak since day prior, fevers, chills, diarrhea and vomiting x 1. Denies sore throat, HA, abdominal pain, dysuria, sob or cough. No sick contacts. No increased drainage from DLES. Lives alone.    In the ED pt febrile, tachycardic and with WBC 20. CXR with b/l edema.

## 2022-01-01 NOTE — PLAN OF CARE
"      CICU PLAN OF CARE    Dx: Sepsis    Shift Events: Pt remained stable through the day. No acute events.    Goals of Care: Monitor VAD. Pt has stepdown orders. Pt had 3 episodes of watery diarrhea. MD notified.    Neuro: AAO x4    Vital Signs: BP (!) 74/0 (BP Location: Right arm, Patient Position: Lying)   Pulse 97   Temp 97.8 °F (36.6 °C)   Resp (!) 26   Ht 5' 7" (1.702 m)   Wt 90.2 kg (198 lb 13.7 oz)   SpO2 (!) 92%   BMI 31.15 kg/m²     Respiratory: Room Air    Diet: Cardiac Diet    Gtts: KVO    Urine Output: Voids Spontaneously      Labs/Accuchecks: Labs and accuchecks.    Skin: See flowsheets.       "
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
## 2022-01-01 NOTE — PLAN OF CARE
Discharge Recommendation: HHPT.    Evaluation completed today. PT goals appropriate.    Patient is safe to perform in-room ambulation with CGA and HHA with nursing staff.    Please continue Progressive Mobility Protocol as appropriate.    Crista Lea, PT, DPT  2022  Pager: [phone]      Problem: Physical Therapy Goal  Goal: Physical Therapy Goal  Description: Goals to be met by: 2022     Patient will increase functional independence with mobility by performin. Sit to stand transfer with Modified [place]  2. Bed to chair transfer with Supervision using LRAD  3. Gait  x 150 feet with Supervision using LRAD with no seated rest breaks and VSS.   4. Ascend/descend 4 stair with no Handrails Stand-by Assistnce.   5. Lower extremity exercise program x30 reps per handout, with independence    Outcome: Ongoing, Progressing
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
## 2022-01-01 NOTE — PLAN OF CARE
Problem: Occupational Therapy Goal  Goal: Occupational Therapy Goal  Description: Goals to be met by: 2/1/22     Patient will increase functional independence with ADLs by performing:    UE Dressing with [place].  LE Dressing with [place].  Grooming while standing at sink with [place].  Toileting from toilet with [place] for hygiene and clothing management.   Bathing from  shower chair/bench with [place].  Toilet transfer to toilet with [place].  Increased functional strength to WFL for B UE.  Upper extremity exercise program x15 reps per handout, with independence.    Outcome: Ongoing, Progressing
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
## 2022-01-01 NOTE — PROGRESS NOTES
01/01/2022  Macey Saleh    Current provider:  Jeanette Tafoya MD    TXP LVAD INTERROGATIONS 1/1/2022 1/1/2022 1/1/2022 1/1/2022 1/1/2022 1/1/2022 1/1/2022   Type HeartMate3 HeartMate3 HeartMate3 HeartMate3 HeartMate3 HeartMate3 HeartMate3   Flow 4.8 4.8 4.8 4.8 4.7 4.7 4.7   Speed 5300 5300 5300 5350 5300 5300 5300   PI 3.1 3.2 3.2 3.4 3.4 3.4 3.5   Power (Centeno) 3.8 3.8 3.9 3.8 3.9 3.9 3.9   LSL 4900 4900 4900 4350 4900 4900 4900   Pulsatility Intermittent pulse Intermittent pulse Intermittent pulse Intermittent pulse Intermittent pulse Intermittent pulse Intermittent pulse          Rounded on Saba A Oren to ensure all mechanical assist device settings (IABP or VAD) were appropriate and all parameters were within limits.  I was able to ensure all back up equipment was present, the staff had no issues, and the Perfusion Department daily rounding was complete.    In emergency, the nursing units have been notified to contact the perfusion department either by:  Calling [phone] from 630am to 4pm Mon thru Fri, or by contacting the hospital  from 4pm to 630am and on weekends and asking to speak with the perfusionist on call.    3:51 PM
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
## 2022-01-01 NOTE — ASSESSMENT & PLAN NOTE
BNP ~600, baseline 20 four months ago  Reports 12 lbs weight gain  Can't see JVD with obesity  Having active symptoms of congestion    Plan  -K 2.9 (not tolerating PO replacement), replace via IV route and then start diuresis with IV lasix  -IV KCl 10 mEq going right now, once finished will start another 10 mEq

## 2022-01-02 PROBLEM — T82.9XXA LEFT VENTRICULAR ASSIST DEVICE COMPLICATION: Status: RESOLVED | Noted: 2021-01-01 | Resolved: 2022-01-01

## 2022-01-02 PROBLEM — R50.9 FEVER: Status: ACTIVE | Noted: 2021-01-01

## 2022-01-02 NOTE — ASSESSMENT & PLAN NOTE
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 Site with no drainage  -Blood cxs from 1/1 with NGTD  -Appreciate ID's help. --switched vanc to dapto 1/1 given hx of VISA. Continue Cefepime

## 2022-01-02 NOTE — PLAN OF CARE
Recommendations     1. Continue Cardiac diet, fluid per MD      2. Add Optisource TID (Vanilla) to aid in energy and protein intake               - D/c if intake >50% to prevent overnutrition      3. RD to monitor and follow     Goals: Pt to receive ONS by RD f/u  Nutrition Goal Status: new  Communication of RD Recs:  (POC)

## 2022-01-02 NOTE — NURSING TRANSFER
Nursing Transfer Note      1/1/2022     Reason patient is being transferred: Stepdown    Transfer To: [id number]    Transfer via wheelchair    Transfer with cardiac monitoring    Transported by DANIEL Chinchilla RN/KWESI Tabares RN    Medicines sent: yes - cefepime, vanc, probiotic    Any special needs or follow-up needed: LVAD    Chart send with patient: Yes    Patient reassessed at: 01/01/22 2330 (date, time)    Upon arrival to floor: cardiac monitor applied, patient oriented to room, call bell in reach and bed in lowest position

## 2022-01-02 NOTE — ASSESSMENT & PLAN NOTE
Procedure: Device Interrogation Including analysis of device parameters  Current Settings: Ventricular Assist Device  Review of device function is stable/unstable    TXP LVAD INTERROGATIONS 1/2/2022 1/1/2022 1/1/2022 1/1/2022 1/1/2022 1/1/2022 1/1/2022   Type HeartMate3 HeartMate3 HeartMate3 HeartMate3 HeartMate3 HeartMate3 HeartMate3   Flow 4.6 4.7 4.7 4.7 4.7 4.7 4.7   Speed 5300 5300 5300 5350 5300 5300 5300   PI 3.2 3.6 3.7 3.6 3.5 3.8 3.5   Power (Centeno) 3.8 3.8 3.8 3.8 3.8 3.8 3.8   LSL - 4900 4900 4950 4900 4900 4900   Pulsatility Intermittent pulse Intermittent pulse Intermittent pulse Intermittent pulse Intermittent pulse Intermittent pulse Intermittent pulse

## 2022-01-02 NOTE — PLAN OF CARE
Pt free of falls and injury during shift. POC reviewed with pt VS stable and AAox4. Sinus rhythm On telemetry. LVAD dressing clean and intact.  No complaints. Educated pt why he is at risk for falls and to use call light for assistance. Yellow non-slip socks on pt. Bed low and locked, call light with in reach. Will continue to monitor.

## 2022-01-02 NOTE — NURSING TRANSFER
Nursing Transfer Note      1/1/2022     Reason patient is being transferred: upgrade    [id number]    Transfer with cardiac monitoring    Transported by RN    Medicines sent: yes    Any special needs or follow-up needed: septic workup    Chart send with patient: Yes    Notified: n/a     Patient reassessed at: 01/01/22 0400 (date, time)    Upon arrival to floor: cardiac monitor applied, patient oriented to room, call bell in reach and bed in lowest position, skin assessed - wound noted.

## 2022-01-02 NOTE — ASSESSMENT & PLAN NOTE
-S/P DT HM3 with MVR 2/2/20  -Current speed 5300  -INR goal 1.5-2.0. INR today 2.4 - continue holding Coumadin  -LDH stable        Procedure: Device Interrogation Including analysis of device parameters  Current Settings: Ventricular Assist Device  Review of device function is stable      TXP LVAD INTERROGATIONS 1/2/2022 1/1/2022 1/1/2022 1/1/2022 1/1/2022 1/1/2022 1/1/2022   Type HeartMate3 HeartMate3 HeartMate3 HeartMate3 HeartMate3 HeartMate3 HeartMate3   Flow 4.6 4.7 4.7 4.7 4.7 4.7 4.7   Speed 5300 5300 5300 5350 5300 5300 5300   PI 3.2 3.6 3.7 3.6 3.5 3.8 3.5   Power (Centeno) 3.8 3.8 3.8 3.8 3.8 3.8 3.8   LSL - 4900 4900 4950 4900 4900 4900   Pulsatility Intermittent pulse Intermittent pulse Intermittent pulse Intermittent pulse Intermittent pulse Intermittent pulse Intermittent pulse

## 2022-01-02 NOTE — SUBJECTIVE & OBJECTIVE
**Interval History: Feels better. Afebrile past 24 hours and WCC trending down at 19K. Vanc changed to Dapto by ID on 1/1 given hx of VISA, baseline CK 37. Cefepime continues. No further hypotension since home antihypertensives D/C'd yesterday. Euvolemic on exam. INR 2.4 with goal 1.5-2.0 following ICH last August, Coumadin remains on hold    Continuous Infusions:  Scheduled Meds:   atorvastatin  20 mg Oral QHS    ceFEPime (MAXIPIME) IVPB  2 g Intravenous Q8H    DAPTOmycin (CUBICIN)  IV  8 mg/kg Intravenous Q24H    docusate sodium  100 mg Oral Daily    Lactobacillus rhamnosus GG  1 capsule Oral Daily    magnesium oxide  400 mg Oral BID    spironolactone  50 mg Oral Daily     PRN Meds:acetaminophen, guaiFENesin 100 mg/5 ml    Review of patient's allergies indicates:   Allergen Reactions    Iodine and iodide containing products      Objective:     Vital Signs (Most Recent):  Temp: 98.5 °F (36.9 °C) (01/01/22 2335)  Pulse: 85 (01/01/22 2335)  Resp: 16 (01/01/22 2335)  BP: (!) 80/0 (01/01/22 2330)  SpO2: (!) 92 % (01/01/22 2335) Vital Signs (24h Range):  Temp:  [97.8 °F (36.6 °C)-98.9 °F (37.2 °C)] 98.5 °F (36.9 °C)  Pulse:  [] 85  Resp:  [11-32] 16  SpO2:  [92 %-95 %] 92 %  BP: ()/(0-75) 80/0     Patient Vitals for the past 72 hrs (Last 3 readings):   Weight   01/01/22 0415 90.2 kg (198 lb 13.7 oz)   12/31/21 2336 95.4 kg (210 lb 5.1 oz)     Body mass index is 31.15 kg/m².      Intake/Output Summary (Last 24 hours) at 1/2/2022 0711  Last data filed at 1/1/2022 2100  Gross per 24 hour   Intake 399.02 ml   Output 200 ml   Net 199.02 ml       Hemodynamic Parameters:       Telemetry: ST with PVC's    Physical Exam  Constitutional:       Appearance: Normal appearance.   HENT:      Head: Normocephalic and atraumatic.   Eyes:      Conjunctiva/sclera: Conjunctivae normal.   Neck:      Comments: Neck veins are not elevated  Cardiovascular:      Rate and Rhythm: Regular rhythm. Tachycardia present.       Comments: Smooth VAD hum  Pulmonary:      Effort: Pulmonary effort is normal.      Breath sounds: Normal breath sounds.   Abdominal:      General: Bowel sounds are normal.      Palpations: Abdomen is soft.   Musculoskeletal:         General: No swelling. Normal range of motion.      Cervical back: Normal range of motion and neck supple.   Skin:     General: Skin is warm and dry.      Capillary Refill: Capillary refill takes 2 to 3 seconds.   Neurological:      General: No focal deficit present.      Mental Status: He is alert and oriented to person, place, and time.   Psychiatric:         Mood and Affect: Mood normal.         Behavior: Behavior normal.         Thought Content: Thought content normal.         Judgment: Judgment normal.         Significant Labs:  CBC:  Recent Labs   Lab 01/01/22  0020 01/01/22  1132 01/02/22  0302   WBC 20.18* 26.01* 19.37*   RBC 5.01 5.01 4.67   HGB 8.9* 8.8* 8.0*   HCT 31.1* 30.7* 28.6*   * 564* 560*   MCV 62* 61* 61*   MCH 17.8* 17.6* 17.1*   MCHC 28.6* 28.7* 28.0*     BNP:  Recent Labs   Lab 01/01/22  0020   *     CMP:  Recent Labs   Lab 01/01/22  0020 01/01/22  1132 01/02/22  0302    196* 113*   CALCIUM 8.5* 8.4* 8.5*   ALBUMIN 2.6*  --   --    PROT 7.9  --   --    * 129* 132*   K 2.9* 3.5 3.3*   CO2 33* 32* 29   CL 86* 89* 92*   BUN 7 12 13   CREATININE 0.9 1.4 1.3   ALKPHOS 106  --   --    ALT 11  --   --    AST 17  --   --    BILITOT 1.2*  --   --       Coagulation:   Recent Labs   Lab 01/01/22  0020 01/01/22  1114 01/02/22  0302   INR 2.3* 2.3* 2.4*   APTT 40.0* 30.9 37.9*     LDH:  Recent Labs   Lab 01/01/22  0020 01/01/22  1132 01/02/22  0302    525* 254     Microbiology:  Microbiology Results (last 7 days)     Procedure Component Value Units Date/Time    Blood culture [[id number]] Collected: 01/01/22 0020    Order Status: Completed Specimen: Blood Updated: 01/02/22 0613     Blood Culture, Routine No Growth to date      No Growth to date     Blood culture [[id number]] Collected: 01/01/22 0020    Order Status: Completed Specimen: Blood Updated: 01/02/22 0613     Blood Culture, Routine No Growth to date      No Growth to date          I have reviewed all pertinent labs within the past 24 hours.    Estimated Creatinine Clearance: 67.1 mL/min (based on SCr of 1.3 mg/dL).    Diagnostic Results:  I have reviewed all pertinent imaging results/findings within the past 24 hours.

## 2022-01-02 NOTE — NURSING TRANSFER
Nursing Transfer Note      1/1/2022     Reason patient is being transferred: stepdown from CICU to CSU    Transfer To:      Transfer via wheelchair    Transfer with cardiac monitoring    Transported by Christine Chinchilla RN    Medicines sent: Yes    Any special needs or follow-up needed: LVAD, no    Chart send with patient: Yes    Upon arrival to floor: patient oriented to room, telemetry placed, LVAD inventory completed, call light placed near patient

## 2022-01-02 NOTE — CONSULTS
"[facility] - Cardiology Stepdown  Adult Nutrition  Consult Note    SUMMARY     Recommendations    1. Continue Cardiac diet, fluid per MD     2. Add Optisource TID (Vanilla) to aid in energy and protein intake    - D/c if intake >50% to prevent overnutrition     3. RD to monitor and follow    Goals: Pt to receive ONS by RD f/u  Nutrition Goal Status: new  Communication of RD Recs:  (POC)    Assessment and Plan    Nutrition Problem  Inadequate energy intake     Related to (etiology):   Decreased appetite    Signs and Symptoms (as evidenced by):   Decreased PO intake     Interventions/Recommendations (treatment strategy):  Collaboration of nutrition care with other providers    Nutrition Diagnosis Status:   New     Reason for Assessment    Reason For Assessment: consult  Diagnosis:  (fever)  Relevant Medical History: LVAD, sepsis, alcohol abuse, stroke  Interdisciplinary Rounds: did not attend  General Information Comments: Pt reports a poor appetite and weakness, only eating a few bites of meals. PTA with good intake. #, wt gain r/t fluid retention. Denies N/V/C currently, having some diarrhea. Agreeable to trying ONS. NFPE not warranted - pt appears nourished.  Nutrition Discharge Planning: Heart healthy diet    Nutrition Risk Screen    Nutrition Risk Screen: no indicators present    Nutrition/Diet History    Spiritual, Cultural Beliefs, Roman [employer] Practices, Values that Affect Care: no  Food Allergies: NKFA  Factors Affecting Nutritional Intake: decreased appetite    Anthropometrics    Temp: (!) 100.4 °F (38 °C)  Height Method: Stated  Height: 5' 7.01" (170.2 cm)  Height (inches): 67.01 in  Weight Method: Standard Scale  Weight: 89.5 kg (197 lb 5 oz)  Weight (lb): 197.31 lb  Ideal Body Weight (IBW), Male: 148.06 lb  % Ideal Body Weight, Male (lb): 133.26 %  BMI (Calculated): 30.9       Lab/Procedures/Meds    Pertinent Labs Reviewed: reviewed  Pertinent Labs Comments: Na 132, K 3.3, Chl 92, Glu 113, Ca " 8.5  Pertinent Medications Reviewed: reviewed  Pertinent Medications Comments: statin, docusate sodium, spironolactone    Estimated/Assessed Needs    Weight Used For Calorie Calculations: 89.5 kg (197 lb 5 oz)  Energy Calorie Requirements (kcal): 1845  Energy Need Method: [place]-St Jeor (x 1.1 PAL)  Protein Requirements: 89 - 107 (1.0 - 1.2 g/kg)  Weight Used For Protein Calculations: 89.5 kg (197 lb 5 oz)  Fluid Requirements (mL): 1 mL/kcal or per MD  Estimated Fluid Requirement Method: RDA Method  RDA Method (mL): 1845       Nutrition Prescription Ordered    Current Diet Order: Cardiac diet    Evaluation of Received Nutrient/Fluid Intake    Energy Calories Required: not meeting needs  Protein Required: not meeting needs  Comments: LBM 1/2  % Intake of Estimated Energy Needs: 0 - 25 %  % Meal Intake: 0 - 25 %    Nutrition Risk    Level of Risk/Frequency of Follow-up:  (1x/week)       Monitor and Evaluation    Food and Nutrient Intake: energy intake,food and beverage intake  Food and Nutrient Adminstration: diet order  Knowledge/Beliefs/Attitudes: food and nutrition knowledge/skill,beliefs and attitudes  Physical Activity and Function: factors affecting access to physical activity,nutrition-related ADLs and IADLs  Anthropometric Measurements: weight,weight change,body mass index  Biochemical Data, Medical Tests and Procedures: electrolyte and renal panel,gastrointestinal profile,glucose/endocrine profile,inflammatory profile,lipid profile  Nutrition-Focused Physical Findings: overall appearance       Nutrition Follow-Up    RD Follow-up?: Yes

## 2022-01-03 NOTE — SUBJECTIVE & OBJECTIVE
Interval History:   Continues to have fevers.  Denies chills, sweats.  Dariel cough, SOB  WBC 19  Blood cultures 1/1 2/4 bottles positive for GPC, ID pending.   Blood cx 1/2 NGTD  On dapto/meropenem  Patient reports he feels somewhat better today.     CT A/P 1/2:    1. Stably positioned LVAD with relatively unchanged small region of soft tissue induration and slight skin thickening about the drive line in the anterior abdominal wall as discussed above.  Findings do not appear appreciably changed from prior CT examinations.   2. Left basilar ground-glass and airspace opacities with additional slight ground-glass attenuation in the left upper lobe.  Findings concerning for possible underlying infectious process with differential considerations to include non infectious inflammatory etiology or asymmetric pulmonary edema.  3. Mild nonspecific mediastinal adenopathy, slightly increased from prior study.   4. Cholelithiasis.   5. Nonspecific bilateral perinephric fat stranding, correlation with urinalysis advised.   6. Diverticulosis without evidence to suggest acute diverticulitis.   7. Additional findings as discussed above.      Review of Systems   Constitutional: Positive for activity change and fever. Negative for chills, diaphoresis and fatigue.   HENT: Negative for rhinorrhea and sore throat.    Eyes: Negative.    Respiratory: Negative for cough and shortness of breath.    Cardiovascular: Negative for chest pain and leg swelling.   Gastrointestinal: Negative for abdominal pain, diarrhea, nausea and vomiting.   Genitourinary: Negative for difficulty urinating, dysuria and hematuria.   Musculoskeletal: Negative for arthralgias and myalgias.   Skin: Positive for wound (open wound upper mid abdomen). Negative for rash.   Neurological: Negative for dizziness and headaches.   Psychiatric/Behavioral: Negative for agitation. The patient is not nervous/anxious.      Objective:     Vital Signs (Most Recent):  Temp: (!) 100.6  °F (38.1 °C) (01/03/22 0726)  Pulse: (!) 118 (01/03/22 1027)  Resp: 16 (01/03/22 0726)  BP: (!) 82/0 (01/03/22 0726)  SpO2: 97 % (01/03/22 0726) Vital Signs (24h Range):  Temp:  [91.8 °F (33.2 °C)-101 °F (38.3 °C)] 100.6 °F (38.1 °C)  Pulse:  [] 118  Resp:  [16-18] 16  SpO2:  [95 %-98 %] 97 %  BP: ()/(0-45) 82/0     Weight: 89.6 kg (197 lb 8.5 oz)  Body mass index is 30.93 kg/m².    Estimated Creatinine Clearance: 62.2 mL/min (based on SCr of 1.4 mg/dL).    Physical Exam  Vitals reviewed.   Constitutional:       General: He is not in acute distress.     Appearance: He is well-developed. He is obese. He is not ill-appearing, toxic-appearing or diaphoretic.   HENT:      Head: Normocephalic and atraumatic.   Eyes:      General: No scleral icterus.     Conjunctiva/sclera: Conjunctivae normal.   Cardiovascular:      Comments: VAD hum  Pulmonary:      Effort: Pulmonary effort is normal. No respiratory distress.      Breath sounds: No wheezing or rales.   Abdominal:      General: There is no distension.      Palpations: Abdomen is soft.      Tenderness: There is no abdominal tenderness. There is no guarding.      Comments: Epigastric wound dressed - moderate amount drainage on dressing.    DLES site dressed - c/d/i   Musculoskeletal:         General: Normal range of motion.      Cervical back: Normal range of motion.      Right lower leg: No edema.      Left lower leg: No edema.   Skin:     General: Skin is warm and dry.      Findings: No erythema or rash.   Neurological:      Mental Status: He is alert and oriented to person, place, and time.   Psychiatric:         Behavior: Behavior normal.         Significant Labs: All pertinent labs within the past 24 hours have been reviewed.    Significant Imaging: I have reviewed all pertinent imaging results/findings within the past 24 hours.

## 2022-01-03 NOTE — PLAN OF CARE
Brief Interval Note    Patient had febrile episode with Tmax 101. Will obtain repeat blood cultures, DLES cultures, RVP, COVID, UA and Pro-calcitonin. Will give tylenol. Patient is receiving daptomycin and meropenem. jojo continue to monitor.    Ayo Mustafa MD  [facility]  SpectraLink: [id number]

## 2022-01-03 NOTE — PROGRESS NOTES
[facility] Cardiology [facility]  Infectious Disease  Progress Note    Patient Name: Saba Lott  MRN: [id number]  Admission Date: 12/31/2021  Length of Stay: 2 days  Attending Physician: Jeanette Tafoya MD  Primary Care Provider: Mary Lombardi MD    Isolation Status: No active isolations  Assessment/Plan:      * Fever  56-year-old male with history of DCM s/p LVAD 2/6/2020, left occipital IPH, lead endocarditis s/p removal, VISA / Pseudomonas bacteremia on chronic suppressive doxy, presents from clinic with fevers.    Recommendations:  CT CAP to reassess abdominal abscess  Daptomycin dose increased to 10 mg/kg IV  Discontinue cefepime, start meropenem given persistent fevers, also recent course of pip-tazo  Follow-up blood cultures        ID [facility] team will follow patient tomorrow    Thank you for your consult.  Please contact us if you have any additional questions.    Yarelis Eldridge MD  Infectious Disease  [facility] - Cardiology Stepdown    Subjective:     Principal Problem:Fever    HPI: Mr. 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 Pt reports feeling weak since day prior, fevers, chills, diarrhea and vomiting x 1. Denies sore throat, HA, abdominal pain, dysuria, sob or cough. No sick contacts. No increased drainage from DLES. Lives alone.    In the ED pt febrile, tachycardic and with WBC 20. CXR with b/l edema.           Interval History:   Patient continues to have fevers  Reports ongoing fatigue, weakness  No drainage from DLES, epigastric wound with drainage    Review  of Systems   Constitutional: Positive for activity change, chills, fatigue and fever. Negative for diaphoresis.   HENT: Negative for rhinorrhea and sore throat.    Respiratory: Negative for cough and shortness of breath.    Cardiovascular: Negative for chest pain and leg swelling.   Gastrointestinal: Negative for abdominal pain, diarrhea, nausea and vomiting.   Genitourinary: Negative for dysuria and hematuria.   Musculoskeletal: Negative for arthralgias and myalgias.   Skin: Negative for rash.   Neurological: Negative for headaches.     Objective:     Vital Signs (Most Recent):  Temp: 99.7 °F (37.6 °C) (01/02/22 1950)  Pulse: 67 (01/02/22 1950)  Resp: 16 (01/02/22 1950)  BP: (!) 123/45 (01/02/22 1543)  SpO2: 97 % (01/02/22 1950) Vital Signs (24h Range):  Temp:  [98.1 °F (36.7 °C)-101 °F (38.3 °C)] 99.7 °F (37.6 °C)  Pulse:  [] 67  Resp:  [16-27] 16  SpO2:  [92 %-97 %] 97 %  BP: ()/(0-62) 123/45     Weight: 89.5 kg (197 lb 5 oz)  Body mass index is 30.9 kg/m².    Estimated Creatinine Clearance: 66.9 mL/min (based on SCr of 1.3 mg/dL).    Physical Exam  Vitals reviewed.   Constitutional:       General: He is not in acute distress.     Appearance: He is well-developed. He is obese. He is not ill-appearing, toxic-appearing or diaphoretic.   HENT:      Head: Normocephalic and atraumatic.   Eyes:      Conjunctiva/sclera: Conjunctivae normal.   Cardiovascular:      Comments: LVAD   Pulmonary:      Effort: Pulmonary effort is normal. No respiratory distress.      Breath sounds: No wheezing or rales.   Abdominal:      General: There is no distension.      Palpations: Abdomen is soft.      Comments: Epigastric wound dressed, DLES site dressed   Musculoskeletal:         General: Normal range of motion.   Skin:     General: Skin is warm and dry.      Findings: No erythema or rash.   Neurological:      Mental Status: He is alert and oriented to person, place, and time.   Psychiatric:         Behavior: Behavior  normal.         Significant Labs: All pertinent labs within the past 24 hours have been reviewed.    Significant Imaging: I have reviewed all pertinent imaging results/findings within the past 24 hours.

## 2022-01-03 NOTE — ASSESSMENT & PLAN NOTE
Symptoms on admit: cough, chills, sputum production, nausea, vomiting x 1, weakness  -Temp 103 12/31. 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 Site with no drainage  -Blood cxs from 1/1 with with GPC. Blood cxs from 1/2 with NGTD  -Appreciate ID's help. --switched vanc to dapto 1/1 given hx of VISA. Cefepime switched to Meropenem 1/2

## 2022-01-03 NOTE — ASSESSMENT & PLAN NOTE
56-year-old male with history of DCM s/p LVAD 2/6/2020, left occipital IPH, lead endocarditis s/p removal, history of VISA / Pseudomonas bacteremia on chronic suppressive doxy, presents from clinic with fevers.    Blood cultures of 1/1 - 2/4 bottles + GPC, ID pending.   Blood cultures 1/2 - NGTD     CT A/P 1/2 - unchanged small region of soft tissue induration and slight skin thickening about the drive line in anterior abdominal wall. No definite focal fluid collection about the LVAD. No definite new regions of soft tissue induration or focal fluid collections are identified about the remaining subcutaneous course of the LVAD driveline  Noted patchy LLL GGO and airspace opacities with additional patchy ground glass attenuation in DEVORAH  Slight non-specific perinephric fat stranding - U/A wnl    Persistent fevers - repeat blood cultures, DLES cultures, COVID, RIP panel, procal ordered by Primary Team.  Patient reports feeling better today than yesterday.  Denies cough, SOB. O2 sats 92-98% on RA.  Active drainage from epigastric wound    Plan/recommendations:  1.  Repeat blood cultures ordered for am.  2.  Continue IV daptomycin 10 mg/kg for now  3.  Continue IV meropenem 2 g q 8 hours for now  4.  Will follow blood cultures, DLES cultures and adjust abx accordingly.    5.  Will follow.     Data reviewed and plan discussed with ID staff, Dr. Kim

## 2022-01-03 NOTE — SUBJECTIVE & OBJECTIVE
**Interval History: Temp spike of 101, WCC still 19K and blood cxs from 1/1 with [facility]PC. ID aware - currently on Meropenem and Dapto given h/o VISA and pseudomonas bacteremia. He has no complaints today. BNP down from 615 on admit to 400, remains euvolemic on exam. sCr still 1.4 (baseline ~ 0.9). No further significant hypotension - home antihypertensives remain on hold except Aldactone which was increased on 1/1 2/2 hypokalemia - K+ today 3.9 - will decrease dose back to 25 mg qd    Continuous Infusions:  Scheduled Meds:   atorvastatin  20 mg Oral QHS    DAPTOmycin (CUBICIN)  IV  10 mg/kg Intravenous Q24H    docusate sodium  100 mg Oral Daily    Lactobacillus rhamnosus GG  1 capsule Oral Daily    magnesium oxide  400 mg Oral BID    meropenem (MERREM) IVPB  2 g Intravenous Q8H    spironolactone  50 mg Oral Daily     PRN Meds:acetaminophen, guaiFENesin 100 mg/5 ml    Review of patient's allergies indicates:   Allergen Reactions    Iodine and iodide containing products      Objective:     Vital Signs (Most Recent):  Temp: (!) 100.6 °F (38.1 °C) (01/03/22 0726)  Pulse: (!) 118 (01/03/22 1027)  Resp: 16 (01/03/22 0726)  BP: (!) 82/0 (01/03/22 0726)  SpO2: 97 % (01/03/22 0726) Vital Signs (24h Range):  Temp:  [91.8 °F (33.2 °C)-101 °F (38.3 °C)] 100.6 °F (38.1 °C)  Pulse:  [] 118  Resp:  [16-18] 16  SpO2:  [95 %-98 %] 97 %  BP: ()/(0-45) 82/0     Patient Vitals for the past 72 hrs (Last 3 readings):   Weight   01/03/22 0800 89.6 kg (197 lb 8.5 oz)   01/02/22 1700 89.5 kg (197 lb 5 oz)   01/02/22 0700 89.5 kg (197 lb 5 oz)     Body mass index is 30.93 kg/m².      Intake/Output Summary (Last 24 hours) at 1/3/2022 1117  Last data filed at 1/3/2022 0522  Gross per 24 hour   Intake 1073.3 ml   Output 1425 ml   Net -351.7 ml       Hemodynamic Parameters:       Telemetry: ST    Physical Exam  Constitutional:       Appearance: Normal appearance.   HENT:      Head: Normocephalic and atraumatic.   Eyes:       Conjunctiva/sclera: Conjunctivae normal.   Neck:      Comments: Neck veins are not elevated  Cardiovascular:      Rate and Rhythm: Regular rhythm. Tachycardia present.      Comments: Smooth VAD hum  Pulmonary:      Effort: Pulmonary effort is normal.      Breath sounds: Normal breath sounds.   Abdominal:      General: Bowel sounds are normal.      Palpations: Abdomen is soft.   Musculoskeletal:         General: No swelling. Normal range of motion.      Cervical back: Normal range of motion and neck supple.   Skin:     General: Skin is warm and dry.      Capillary Refill: Capillary refill takes 2 to 3 seconds.   Neurological:      General: No focal deficit present.      Mental Status: He is alert and oriented to person, place, and time.   Psychiatric:         Mood and Affect: Mood normal.         Behavior: Behavior normal.         Thought Content: Thought content normal.         Judgment: Judgment normal.         Significant Labs:  CBC:  Recent Labs   Lab 01/01/22  1132 01/02/22  0302 01/03/22  0518   WBC 26.01* 19.37* 19.44*   RBC 5.01 4.67 4.90   HGB 8.8* 8.0* 8.5*   HCT 30.7* 28.6* 30.2*   * 560* 585*   MCV 61* 61* 62*   MCH 17.6* 17.1* 17.3*   MCHC 28.7* 28.0* 28.1*     BNP:  Recent Labs   Lab 01/01/22  0020 01/03/22  0517   * 400*     CMP:  Recent Labs   Lab 01/01/22  0020 01/01/22  0020 01/01/22  1132 01/02/22  0302 01/03/22  0517 01/03/22  0518      < > 196* 113*  --  96   CALCIUM 8.5*   < > 8.4* 8.5*  --  8.6*   ALBUMIN 2.6*  --   --   --  2.4*  --    PROT 7.9  --   --   --  6.7  --    *   < > 129* 132*  --  134*   K 2.9*   < > 3.5 3.3*  --  3.9   CO2 33*   < > 32* 29  --  32*   CL 86*   < > 89* 92*  --  92*   BUN 7   < > 12 13  --  14   CREATININE 0.9   < > 1.4 1.3  --  1.4   ALKPHOS 106  --   --   --  89  --    ALT 11  --   --   --  8*  --    AST 17  --   --   --  13  --    BILITOT 1.2*  --   --   --  0.8  --     < > = values in this interval not displayed.      Coagulation:    Recent Labs   Lab 01/01/22  1114 01/02/22  0302 01/03/22  0518   INR 2.3* 2.4* 2.1*   APTT 30.9 37.9* 38.9*     LDH:  Recent Labs   Lab 01/01/22  0020 01/01/22  1132 01/02/22  0302 01/03/22  0517    525* 254 229     Microbiology:  Microbiology Results (last 7 days)     Procedure Component Value Units Date/Time    Blood culture [[id number]] Collected: 01/02/22 1946    Order Status: Completed Specimen: Blood Updated: 01/03/22 0315     Blood Culture, Routine No Growth to date    Blood culture [[id number]] Collected: 01/02/22 1946    Order Status: Completed Specimen: Blood Updated: 01/03/22 0315     Blood Culture, Routine No Growth to date    Blood culture [[id number]] Collected: 01/01/22 0020    Order Status: Completed Specimen: Blood Updated: 01/02/22 2330     Blood Culture, Routine Gram stain aer bottle: Gram positive cocci in clusters resembling Staph       Results called to and read back by:DANIEL BLANCHARD 01/02/2022  23:29    Blood culture [[id number]] Collected: 01/01/22 0020    Order Status: Completed Specimen: Blood Updated: 01/02/22 2236     Blood Culture, Routine Gram stain aer bottle: Gram positive cocci       Results called to and read back by: DANIEL BLANCHARD RN  01/02/2022  22:36          I have reviewed all pertinent labs within the past 24 hours.    Estimated Creatinine Clearance: 62.2 mL/min (based on SCr of 1.4 mg/dL).    Diagnostic Results:  I have reviewed all pertinent imaging results/findings within the past 24 hours.

## 2022-01-03 NOTE — ASSESSMENT & PLAN NOTE
- Interval History was obtained from team member  during rounding today.  - Mobilization/Physical Therapy is ongoing.  - Anticoagulation held  - Admitted with sepsis   - WBC 19 from 269 on admit   - Temp overnight of 101  - INR goal 1.5-2 but AC on hold. INR currently 2.1  -   - Creatinine 1.4  - ID following for abx management. Culture from 1/1 +   - HTN meds on hold    More than 50 percent of the care dominated counseling and coordinating care with different team members. The VAD was interrogated and significant findings noted above.

## 2022-01-03 NOTE — SUBJECTIVE & OBJECTIVE
Subjective:     Interval History: NAEON. Admitted with sepsis. Had temp 101 overnight. ID following. WBC 19 from 26 on admit. Resting comfortably in bed.     Implant 2/6/2020    Medications:  Continuous Infusions:  Scheduled Meds:   atorvastatin  20 mg Oral QHS    DAPTOmycin (CUBICIN)  IV  10 mg/kg Intravenous Q24H    docusate sodium  100 mg Oral Daily    Lactobacillus rhamnosus GG  1 capsule Oral Daily    magnesium oxide  400 mg Oral BID    meropenem (MERREM) IVPB  2 g Intravenous Q8H    [START ON 1/4/2022] spironolactone  25 mg Oral Daily    warfarin  7.5 mg Oral Daily     PRN Meds:acetaminophen, guaiFENesin 100 mg/5 ml     Objective:     Vital Signs (Most Recent):  Temp: 99.8 °F (37.7 °C) (01/03/22 1147)  Pulse: (!) 114 (01/03/22 1200)  Resp: 18 (01/03/22 1147)  BP: (!) 74/0 (01/03/22 1147)  SpO2: 97 % (01/03/22 0726) Vital Signs (24h Range):  Temp:  [91.8 °F (33.2 °C)-101 °F (38.3 °C)] 99.8 °F (37.7 °C)  Pulse:  [] 114  Resp:  [16-18] 18  SpO2:  [95 %-98 %] 97 %  BP: ()/(0-45) 74/0       Intake/Output Summary (Last 24 hours) at 1/3/2022 1416  Last data filed at 1/3/2022 1200  Gross per 24 hour   Intake 1433.3 ml   Output 1825 ml   Net -391.7 ml       Physical Exam  Vitals reviewed.   Constitutional:       Appearance: He is well-developed and well-nourished.   HENT:      Head: Normocephalic and atraumatic.   Cardiovascular:      Rate and Rhythm: Regular rhythm. Tachycardia present.   Pulmonary:      Effort: Pulmonary effort is normal. No respiratory distress.   Skin:     Coloration: Skin is not pale.   Psychiatric:         Mood and Affect: Mood and affect normal.         Significant Labs:  ABGs: No results for input(s): PH, PCO2, PO2, HCO3, POCSATURATED, BE in the last 48 hours.  Amylase: No results for input(s): AMYLASE in the last 48 hours.  BMP:   Recent Labs   Lab 01/03/22  0518   GLU 96   *   K 3.9   CL 92*   CO2 32*   BUN 14   CREATININE 1.4   CALCIUM 8.6*   MG 2.4     Cardiac  markers: No results for input(s): CKMB, CPKMB, TROPONINT, TROPONINI, MYOGLOBIN in the last 48 hours.  CBC:   Recent Labs   Lab 01/03/22 0518   WBC 19.44*   RBC 4.90   HGB 8.5*   HCT 30.2*   *   MCV 62*   MCH 17.3*   MCHC 28.1*     CMP:   Recent Labs   Lab 01/02/22  0302 01/03/22 0517 01/03/22 0518   GLU   < >  --  96   CALCIUM   < >  --  8.6*   ALBUMIN  --  2.4*  --    PROT  --  6.7  --    NA   < >  --  134*   K   < >  --  3.9   CO2   < >  --  32*   CL   < >  --  92*   BUN   < >  --  14   CREATININE   < >  --  1.4   ALKPHOS  --  89  --    ALT  --  8*  --    AST  --  13  --    BILITOT  --  0.8  --     < > = values in this interval not displayed.     Coagulation:   Recent Labs   Lab 01/03/22 0518   INR 2.1*   APTT 38.9*     Lactic Acid: No results for input(s): LACTATE in the last 48 hours.  LFTs:   Recent Labs   Lab 01/03/22 0517   ALT 8*   AST 13   ALKPHOS 89   BILITOT 0.8   PROT 6.7   ALBUMIN 2.4*     Lipase: No results for input(s): LIPASE in the last 48 hours.    Significant Diagnostics:  I have reviewed all pertinent imaging results/findings within the past 24 hours.    Procedure: Device Interrogation Including analysis of device parameters  Current Settings: Ventricular Assist Device  Review of device function is stable  TXP LVAD INTERROGATIONS 1/3/2022 1/3/2022 1/3/2022 1/3/2022 1/2/2022 1/2/2022 1/2/2022   Type HeartMate3 HeartMate3 HeartMate3 HeartMate3 HeartMate3 HeartMate3 HeartMate3   Flow 4.8 4.8 4.7 4.7 4.8 4.9 4.9   Speed 5300 5300 53 5300 5350 5300 5300   PI 3.4 3.6 3.4 3.3 3.5 2.7 3.2   Power (Centeno) 3.8 3.8 3.8 3.9 3.9 3.9 3.8   LSL 4900 4900 - - 4900 4900 4900   Pulsatility Intermittent pulse Intermittent pulse Intermittent pulse Intermittent pulse Intermittent pulse Intermittent pulse Intermittent pulse

## 2022-01-03 NOTE — ASSESSMENT & PLAN NOTE
56-year-old male with history of DCM s/p LVAD 2/6/2020, left occipital IPH, lead endocarditis s/p removal, VISA / Pseudomonas bacteremia on chronic suppressive doxy, presents from clinic with fevers.    Recommendations:  CT CAP to reassess abdominal abscess  Daptomycin dose increased to 10 mg/kg IV  Discontinue cefepime, start meropenem given persistent fevers, also recent course of pip-tazo  Follow-up blood cultures

## 2022-01-03 NOTE — ASSESSMENT & PLAN NOTE
-S/P DT HM3 with MVR 2/2/20  -Current speed 5300  -INR goal 1.5-2.0. INR has trended down to 2.1 since Coumadin put on hold on admit. PharmD to manage Coumadin  -LDH stable        Procedure: Device Interrogation Including analysis of device parameters  Current Settings: Ventricular Assist Device  Review of device function is stable      TXP LVAD INTERROGATIONS 1/3/2022 1/3/2022 1/3/2022 1/3/2022 1/2/2022 1/2/2022 1/2/2022   Type HeartMate3 HeartMate3 HeartMate3 HeartMate3 HeartMate3 HeartMate3 HeartMate3   Flow 4.8 4.8 4.7 4.7 4.8 4.9 4.9   Speed 5300 5300 53 5300 5350 5300 5300   PI 3.4 3.6 3.4 3.3 3.5 2.7 3.2   Power (Centeno) 3.8 3.8 3.8 3.9 3.9 3.9 3.8   LSL 4900 4900 - - 4900 4900 4900   Pulsatility Intermittent pulse Intermittent pulse Intermittent pulse Intermittent pulse Intermittent pulse Intermittent pulse Intermittent pulse

## 2022-01-03 NOTE — PROGRESS NOTES
[facility]  Cardiothoracic Surgery  Evaluation and Management/VAD interrogation       Patient Name: Saba Lott  MRN: [id number]  Admission Date: 12/31/2021  Hospital Length of Stay: 3 days  Code Status: Full Code   Attending Physician: Zulma Floyd MD   Referring Provider: Leslie, Provider  Principal Problem:Fever      Subjective:     Post-Op Info:  * No surgery found *         Subjective:     Interval History: NAEON. Admitted with sepsis. Had temp 101 overnight. ID following. WBC 19 from 26 on admit. Resting comfortably in bed.     Implant 2/6/2020    Medications:  Continuous Infusions:  Scheduled Meds:   atorvastatin  20 mg Oral QHS    DAPTOmycin (CUBICIN)  IV  10 mg/kg Intravenous Q24H    docusate sodium  100 mg Oral Daily    Lactobacillus rhamnosus GG  1 capsule Oral Daily    magnesium oxide  400 mg Oral BID    meropenem (MERREM) IVPB  2 g Intravenous Q8H    [START ON 1/4/2022] spironolactone  25 mg Oral Daily    warfarin  7.5 mg Oral Daily     PRN Meds:acetaminophen, guaiFENesin 100 mg/5 ml     Objective:     Vital Signs (Most Recent):  Temp: 99.8 °F (37.7 °C) (01/03/22 1147)  Pulse: (!) 114 (01/03/22 1200)  Resp: 18 (01/03/22 1147)  BP: (!) 74/0 (01/03/22 1147)  SpO2: 97 % (01/03/22 0726) Vital Signs (24h Range):  Temp:  [91.8 °F (33.2 °C)-101 °F (38.3 °C)] 99.8 °F (37.7 °C)  Pulse:  [] 114  Resp:  [16-18] 18  SpO2:  [95 %-98 %] 97 %  BP: ()/(0-45) 74/0       Intake/Output Summary (Last 24 hours) at 1/3/2022 1416  Last data filed at 1/3/2022 1200  Gross per 24 hour   Intake 1433.3 ml   Output 1825 ml   Net -391.7 ml       Physical Exam  Vitals reviewed.   Constitutional:       Appearance: He is well-developed and well-nourished.   HENT:      Head: Normocephalic and atraumatic.   Cardiovascular:      Rate and Rhythm: Regular rhythm. Tachycardia present.   Pulmonary:      Effort: Pulmonary effort is normal. No respiratory distress.   Skin:     Coloration: Skin is  not pale.   Psychiatric:         Mood and Affect: Mood and affect normal.         Significant Labs:  ABGs: No results for input(s): PH, PCO2, PO2, HCO3, POCSATURATED, BE in the last 48 hours.  Amylase: No results for input(s): AMYLASE in the last 48 hours.  BMP:   Recent Labs   Lab 01/03/22 0518   GLU 96   *   K 3.9   CL 92*   CO2 32*   BUN 14   CREATININE 1.4   CALCIUM 8.6*   MG 2.4     Cardiac markers: No results for input(s): CKMB, CPKMB, TROPONINT, TROPONINI, MYOGLOBIN in the last 48 hours.  CBC:   Recent Labs   Lab 01/03/22 0518   WBC 19.44*   RBC 4.90   HGB 8.5*   HCT 30.2*   *   MCV 62*   MCH 17.3*   MCHC 28.1*     CMP:   Recent Labs   Lab 01/02/22  0302 01/03/22 0517 01/03/22 0518   GLU   < >  --  96   CALCIUM   < >  --  8.6*   ALBUMIN  --  2.4*  --    PROT  --  6.7  --    NA   < >  --  134*   K   < >  --  3.9   CO2   < >  --  32*   CL   < >  --  92*   BUN   < >  --  14   CREATININE   < >  --  1.4   ALKPHOS  --  89  --    ALT  --  8*  --    AST  --  13  --    BILITOT  --  0.8  --     < > = values in this interval not displayed.     Coagulation:   Recent Labs   Lab 01/03/22 0518   INR 2.1*   APTT 38.9*     Lactic Acid: No results for input(s): LACTATE in the last 48 hours.  LFTs:   Recent Labs   Lab 01/03/22 0517   ALT 8*   AST 13   ALKPHOS 89   BILITOT 0.8   PROT 6.7   ALBUMIN 2.4*     Lipase: No results for input(s): LIPASE in the last 48 hours.    Significant Diagnostics:  I have reviewed all pertinent imaging results/findings within the past 24 hours.    Procedure: Device Interrogation Including analysis of device parameters  Current Settings: Ventricular Assist Device  Review of device function is stable  TXP LVAD INTERROGATIONS 1/3/2022 1/3/2022 1/3/2022 1/3/2022 1/2/2022 1/2/2022 1/2/2022   Type HeartMate3 HeartMate3 HeartMate3 HeartMate3 HeartMate3 HeartMate3 HeartMate3   Flow 4.8 4.8 4.7 4.7 4.8 4.9 4.9   Speed 5300 5300 53 5300 5350 5300 5300   PI 3.4 3.6 3.4 3.3 3.5 2.7 3.2    Power (Centeno) 3.8 3.8 3.8 3.9 3.9 3.9 3.8   LSL 4900 4900 - - 4900 4900 4900   Pulsatility Intermittent pulse Intermittent pulse Intermittent pulse Intermittent pulse Intermittent pulse Intermittent pulse Intermittent pulse     Assessment/Plan:     LVAD (left ventricular assist device) present  - Interval History was obtained from team member  during rounding today.  - Mobilization/Physical Therapy is ongoing.  - Anticoagulation held  - Admitted with sepsis   - WBC 19 from 269 on admit   - Temp overnight of 101  - INR goal 1.5-2 but AC on hold. INR currently 2.1  -   - Creatinine 1.4  - ID following for abx management. Culture from 1/1 +   - HTN meds on hold    More than 50 percent of the care dominated counseling and coordinating care with different team members. The VAD was interrogated and significant findings noted above.         Eunice Fagan PA-C  Cardiothoracic Surgery  [facility]

## 2022-01-03 NOTE — ASSESSMENT & PLAN NOTE
-K today 3.9  -Aldactone was increased to 50 mg qd 1/1 bc of hypokalemia. As sCr still above baseline at 1.4, will decrease back to 25 mg qd

## 2022-01-03 NOTE — PROGRESS NOTES
attempted to see pt in order to assess needs given admission.    Pt fell asleep three times during assessment.   Heart Transplant  will follow in order to complete note.

## 2022-01-03 NOTE — PT/OT/SLP PROGRESS
Physical Therapy Treatment    Patient Name:  Saba Lott   MRN:  [id number]    Recommendations:     Discharge Recommendations:  home health PT   Discharge Equipment Recommendations: none   Barriers to discharge: None    Assessment:     Saba Lott is a 56 y.o. male admitted with a medical diagnosis of Fever.  He presents with the following impairments/functional limitations:  weakness,impaired balance,gait instability,decreased lower extremity function,impaired endurance,impaired functional mobilty. Pt ambulates 450ft w/ no A/D w/ SBA, therapist manages IV pole. Pt reports dizziness and mild instability but has no LOB.    Rehab Prognosis: Good; patient would benefit from acute skilled PT services to address these deficits and reach maximum level of function.    Recent Surgery: * No surgery found *      Plan:     During this hospitalization, patient to be seen 3 x/week to address the identified rehab impairments via gait training,therapeutic activities,therapeutic exercises and progress toward the following goals:    · Plan of Care Expires:  01/31/22    Subjective     Chief Complaint: dizziness with standing  Patient/Family Comments/goals: walk the entire loop  Pain/Comfort:  · Pain Rating 1: 0/10      Objective:     Communicated with Nurse Franklin prior to session.  Patient found supine with LVAD,peripheral IV,telemetry upon PT entry to room.     General Precautions: Standard, fall,LVAD   Orthopedic Precautions:N/A   Braces: N/A  Respiratory Status: Room air     Functional Mobility:  · Bed Mobility:     · Supine to Sit: modified independence  · Sit to Supine: modified independence  · Transfers:     · Sit to Stand:  contact guard assistance with no AD  · Gait:  Pt ambulates 450ft w/ no A/D w/ SBA, therapist manages IV pole. Pt reports dizziness and mild instability but has no LOB.   · Balance: Good sitting balance, CGA->SBA for dynamic standing balance      AM-PAC 6 CLICK MOBILITY  Turning over in bed (including  adjusting bedclothes, sheets and blankets)?: 4  Sitting down on and standing up from a chair with arms (e.g., wheelchair, bedside commode, etc.): 3  Moving from lying on back to sitting on the side of the bed?: 4  Moving to and from a bed to a chair (including a wheelchair)?: 3  Need to walk in hospital room?: 3  Climbing 3-5 steps with a railing?: 3  Basic Mobility Total Score: 20       Therapeutic Activities and Exercises:    Educated patient on POC, verbalizes understanding   Pt to bathroom at end of treatment, able to manage toileting on his own    Patient left supine with all lines intact and call button in reach..    GOALS:   Multidisciplinary Problems     Physical Therapy Goals        Problem: Physical Therapy Goal    Goal Priority Disciplines Outcome Goal Variances Interventions   Physical Therapy Goal     PT, PT/OT Ongoing, Progressing     Description: Goals to be met by: 2022     Patient will increase functional independence with mobility by performin. Sit to stand transfer with Modified [place]  2. Bed to chair transfer with Supervision using LRAD  3. Gait  x 150 feet with Supervision using LRAD with no seated rest breaks and VSS.   4. Ascend/descend 4 stair with no Handrails Stand-by Assistnce.   5. Lower extremity exercise program x30 reps per handout, with independence                     Time Tracking:     PT Received On: 22  PT Start Time: 1430     PT Stop Time: 1458  PT Total Time (min): 28 min     Billable Minutes: Gait Training 20 min and Therapeutic Activity 8 min    Treatment Type: Treatment  PT/PTA: PT     PTA Visit Number: 0     2022

## 2022-01-03 NOTE — CARE UPDATE
RAPID RESPONSE NURSE ROUND       Rounding completed with charge RN, Claudette. No concerns verbalized at this time. Instructed to call [phone] for further concerns or assistance.

## 2022-01-03 NOTE — SUBJECTIVE & OBJECTIVE
Interval History:   Patient continues to have fevers  Reports ongoing fatigue, weakness  No drainage from DLES, epigastric wound with drainage    Review of Systems   Constitutional: Positive for activity change, chills, fatigue and fever. Negative for diaphoresis.   HENT: Negative for rhinorrhea and sore throat.    Respiratory: Negative for cough and shortness of breath.    Cardiovascular: Negative for chest pain and leg swelling.   Gastrointestinal: Negative for abdominal pain, diarrhea, nausea and vomiting.   Genitourinary: Negative for dysuria and hematuria.   Musculoskeletal: Negative for arthralgias and myalgias.   Skin: Negative for rash.   Neurological: Negative for headaches.     Objective:     Vital Signs (Most Recent):  Temp: 99.7 °F (37.6 °C) (01/02/22 1950)  Pulse: 67 (01/02/22 1950)  Resp: 16 (01/02/22 1950)  BP: (!) 123/45 (01/02/22 1543)  SpO2: 97 % (01/02/22 1950) Vital Signs (24h Range):  Temp:  [98.1 °F (36.7 °C)-101 °F (38.3 °C)] 99.7 °F (37.6 °C)  Pulse:  [] 67  Resp:  [16-27] 16  SpO2:  [92 %-97 %] 97 %  BP: ()/(0-62) 123/45     Weight: 89.5 kg (197 lb 5 oz)  Body mass index is 30.9 kg/m².    Estimated Creatinine Clearance: 66.9 mL/min (based on SCr of 1.3 mg/dL).    Physical Exam  Vitals reviewed.   Constitutional:       General: He is not in acute distress.     Appearance: He is well-developed. He is obese. He is not ill-appearing, toxic-appearing or diaphoretic.   HENT:      Head: Normocephalic and atraumatic.   Eyes:      Conjunctiva/sclera: Conjunctivae normal.   Cardiovascular:      Comments: LVAD   Pulmonary:      Effort: Pulmonary effort is normal. No respiratory distress.      Breath sounds: No wheezing or rales.   Abdominal:      General: There is no distension.      Palpations: Abdomen is soft.      Comments: Epigastric wound dressed, DLES site dressed   Musculoskeletal:         General: Normal range of motion.   Skin:     General: Skin is warm and dry.      Findings: No  erythema or rash.   Neurological:      Mental Status: He is alert and oriented to person, place, and time.   Psychiatric:         Behavior: Behavior normal.         Significant Labs: All pertinent labs within the past 24 hours have been reviewed.    Significant Imaging: I have reviewed all pertinent imaging results/findings within the past 24 hours.

## 2022-01-03 NOTE — PROGRESS NOTES
01/03/2022  Abelardo Sepulveda    Current provider:  Zulma Floyd MD    TXP LVAD INTERROGATIONS 1/3/2022 1/3/2022 1/3/2022 1/2/2022 1/2/2022 1/2/2022 1/2/2022   Type HeartMate3 HeartMate3 HeartMate3 HeartMate3 HeartMate3 HeartMate3 HeartMate3   Flow 4.8 4.7 4.7 4.8 4.9 4.9 4.7   Speed 5300 53 5300 5350 5300 5300 5300   PI 3.6 3.4 3.3 3.5 2.7 3.2 3.4   Power (Centeno) 3.8 3.8 3.9 3.9 3.9 3.8 3.8   LSL 4900 - - 4900 4900 4900 4900   Pulsatility Intermittent pulse Intermittent pulse Intermittent pulse Intermittent pulse Intermittent pulse Intermittent pulse Intermittent pulse          Rounded on Saba A Oren to ensure all mechanical assist device settings (IABP or VAD) were appropriate and all parameters were within limits.  I was able to ensure all back up equipment was present, the staff had no issues, and the Perfusion Department daily rounding was complete.    In emergency, the nursing units have been notified to contact the perfusion department either by:  Calling [phone] from 630am to 4pm Mon thru Fri, or by contacting the hospital  from 4pm to 630am and on weekends and asking to speak with the perfusionist on call.    10:28 AM

## 2022-01-03 NOTE — PLAN OF CARE
AAOx4.  No SOB or other distress.  No complaints of pain or otherwise at this time.  Free of falls, traumas, and injuries.  LVAD numbers and MAPS WNL. LVAD dressing changed on this shift. Temp 101 this evening; Dr. Angela notified and urine sent. Tylenol given. Vital signs stable.  Plan of care reviewed with patient.  Will continue to monitor.

## 2022-01-03 NOTE — PROGRESS NOTES
[facility] - Cardiology Stepdown  Heart Transplant  Progress Note    Patient Name: Saba Lott  MRN: [id number]  Admission Date: 12/31/2021  Hospital Length of Stay: 3 days  Attending Physician: Zulma Floyd MD  Primary Care Provider: Mary Lombardi MD  Principal Problem:Fever    Subjective:     **Interval History: Temp spike of 101, WCC still 19K and blood cxs from 1/1 with GPC. ID aware - currently on Meropenem and Dapto given h/o VISA and pseudomonas bacteremia. He has no complaints today. BNP down from 615 on admit to 400, remains euvolemic on exam. sCr still 1.4 (baseline ~ 0.9). No further significant hypotension - home antihypertensives remain on hold except Aldactone which was increased on 1/1 2/2 hypokalemia - K+ today 3.9 - will decrease dose back to 25 mg qd    Continuous Infusions:  Scheduled Meds:   atorvastatin  20 mg Oral QHS    DAPTOmycin (CUBICIN)  IV  10 mg/kg Intravenous Q24H    docusate sodium  100 mg Oral Daily    Lactobacillus rhamnosus GG  1 capsule Oral Daily    magnesium oxide  400 mg Oral BID    meropenem (MERREM) IVPB  2 g Intravenous Q8H    spironolactone  50 mg Oral Daily     PRN Meds:acetaminophen, guaiFENesin 100 mg/5 ml    Review of patient's allergies indicates:   Allergen Reactions    Iodine and iodide containing products      Objective:     Vital Signs (Most Recent):  Temp: (!) 100.6 °F (38.1 °C) (01/03/22 0726)  Pulse: (!) 118 (01/03/22 1027)  Resp: 16 (01/03/22 0726)  BP: (!) 82/0 (01/03/22 0726)  SpO2: 97 % (01/03/22 0726) Vital Signs (24h Range):  Temp:  [91.8 °F (33.2 °C)-101 °F (38.3 °C)] 100.6 °F (38.1 °C)  Pulse:  [] 118  Resp:  [16-18] 16  SpO2:  [95 %-98 %] 97 %  BP: ()/(0-45) 82/0     Patient Vitals for the past 72 hrs (Last 3 readings):   Weight   01/03/22 0800 89.6 kg (197 lb 8.5 oz)   01/02/22 1700 89.5 kg (197 lb 5 oz)   01/02/22 0700 89.5 kg (197 lb 5 oz)     Body mass index is 30.93 kg/m².      Intake/Output Summary (Last 24 hours) at  1/3/2022 1117  Last data filed at 1/3/2022 0522  Gross per 24 hour   Intake 1073.3 ml   Output 1425 ml   Net -351.7 ml       Hemodynamic Parameters:       Telemetry: ST    Physical Exam  Constitutional:       Appearance: Normal appearance.   HENT:      Head: Normocephalic and atraumatic.   Eyes:      Conjunctiva/sclera: Conjunctivae normal.   Neck:      Comments: Neck veins are not elevated  Cardiovascular:      Rate and Rhythm: Regular rhythm. Tachycardia present.      Comments: Smooth VAD hum  Pulmonary:      Effort: Pulmonary effort is normal.      Breath sounds: Normal breath sounds.   Abdominal:      General: Bowel sounds are normal.      Palpations: Abdomen is soft.   Musculoskeletal:         General: No swelling. Normal range of motion.      Cervical back: Normal range of motion and neck supple.   Skin:     General: Skin is warm and dry.      Capillary Refill: Capillary refill takes 2 to 3 seconds.   Neurological:      General: No focal deficit present.      Mental Status: He is alert and oriented to person, place, and time.   Psychiatric:         Mood and Affect: Mood normal.         Behavior: Behavior normal.         Thought Content: Thought content normal.         Judgment: Judgment normal.         Significant Labs:  CBC:  Recent Labs   Lab 01/01/22  1132 01/02/22  0302 01/03/22  0518   WBC 26.01* 19.37* 19.44*   RBC 5.01 4.67 4.90   HGB 8.8* 8.0* 8.5*   HCT 30.7* 28.6* 30.2*   * 560* 585*   MCV 61* 61* 62*   MCH 17.6* 17.1* 17.3*   MCHC 28.7* 28.0* 28.1*     BNP:  Recent Labs   Lab 01/01/22  0020 01/03/22  0517   * 400*     CMP:  Recent Labs   Lab 01/01/22  0020 01/01/22  0020 01/01/22  1132 01/02/22  0302 01/03/22  0517 01/03/22  0518      < > 196* 113*  --  96   CALCIUM 8.5*   < > 8.4* 8.5*  --  8.6*   ALBUMIN 2.6*  --   --   --  2.4*  --    PROT 7.9  --   --   --  6.7  --    *   < > 129* 132*  --  134*   K 2.9*   < > 3.5 3.3*  --  3.9   CO2 33*   < > 32* 29  --  32*   CL 86*    < > 89* 92*  --  92*   BUN 7   < > 12 13  --  14   CREATININE 0.9   < > 1.4 1.3  --  1.4   ALKPHOS 106  --   --   --  89  --    ALT 11  --   --   --  8*  --    AST 17  --   --   --  13  --    BILITOT 1.2*  --   --   --  0.8  --     < > = values in this interval not displayed.      Coagulation:   Recent Labs   Lab 01/01/22  1114 01/02/22  0302 01/03/22  0518   INR 2.3* 2.4* 2.1*   APTT 30.9 37.9* 38.9*     LDH:  Recent Labs   Lab 01/01/22  0020 01/01/22  1132 01/02/22  0302 01/03/22  0517    525* 254 229     Microbiology:  Microbiology Results (last 7 days)     Procedure Component Value Units Date/Time    Blood culture [[id number]] Collected: 01/02/22 1946    Order Status: Completed Specimen: Blood Updated: 01/03/22 0315     Blood Culture, Routine No Growth to date    Blood culture [[id number]] Collected: 01/02/22 1946    Order Status: Completed Specimen: Blood Updated: 01/03/22 0315     Blood Culture, Routine No Growth to date    Blood culture [[id number]] Collected: 01/01/22 0020    Order Status: Completed Specimen: Blood Updated: 01/02/22 2330     Blood Culture, Routine Gram stain aer bottle: Gram positive cocci in clusters resembling Staph       Results called to and read back by:DANIEL BLANCHARD 01/02/2022  23:29    Blood culture [[id number]] Collected: 01/01/22 0020    Order Status: Completed Specimen: Blood Updated: 01/02/22 2236     Blood Culture, Routine Gram stain aer bottle: Gram positive cocci       Results called to and read back by: DANIEL BLANCHARD RN  01/02/2022  22:36          I have reviewed all pertinent labs within the past 24 hours.    Estimated Creatinine Clearance: 62.2 mL/min (based on SCr of 1.4 mg/dL).    Diagnostic Results:  I have reviewed all pertinent imaging results/findings within the past 24 hours.    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 Patient is short of breath and uncomfortable at the time of giving history. He reports chills, weakness, cough, sputum production, weight gain, nausea, vomiting and trouble breathing. He went to his PCP office yesterday where his temp was 103 and was sent to the hospital afterwards. His white count is 20 on arrival. He is unable to lay flat. His HR is 102-142 sinus. He needs to be diuresed upon arrival but K is 2.9, ordered IV and PO Kcl, he vomited out oral potassium, IV replacement will be done.       * Fever  Symptoms on admit: cough, chills, sputum production, nausea, vomiting x 1, weakness  -Temp 103 12/31. 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 Site with no drainage  -Blood cxs from 1/1 with with GPC. Blood cxs from 1/2 with NGTD  -Appreciate ID's help. --switched vanc to dapto 1/1 given hx of VISA. Cefepime switched to Meropenem 1/2            Sepsis  -See fever    Hypokalemia  -K today 3.9  -Aldactone was increased to 50 mg qd 1/1 bc of hypokalemia. As sCr still above baseline at 1.4, will decrease back to 25 mg qd    LVAD (left ventricular assist device) present  -S/P DT HM3 with MVR 2/2/20  -Current speed 5300  -INR goal 1.5-2.0. INR has trended down to 2.1 since Coumadin put on hold on admit. PharmD to manage Coumadin  -LDH stable        Procedure: Device Interrogation Including analysis of device parameters  Current Settings: Ventricular Assist Device  Review of device function is stable      TXP LVAD INTERROGATIONS 1/3/2022 1/3/2022 1/3/2022 1/3/2022 1/2/2022 1/2/2022 1/2/2022   Type HeartMate3 HeartMate3 HeartMate3  HeartMate3 HeartMate3 HeartMate3 HeartMate3   Flow 4.8 4.8 4.7 4.7 4.8 4.9 4.9   Speed 5300 5300 53 5300 5350 5300 5300   PI 3.4 3.6 3.4 3.3 3.5 2.7 3.2   Power (Centeno) 3.8 3.8 3.8 3.9 3.9 3.9 3.8   LSL 4900 4900 - - 4900 4900 4900   Pulsatility Intermittent pulse Intermittent pulse Intermittent pulse Intermittent pulse Intermittent pulse Intermittent pulse Intermittent [place] [id number]  Heart Transplant  [facility] - Cardiology Stepdown

## 2022-01-03 NOTE — CARE UPDATE
RAPID RESPONSE NURSE ROUND       Rounding completed with charge RN, Dwayne. No concerns verbalized at this time. Instructed to call [phone] for further concerns or assistance.

## 2022-01-04 NOTE — PROGRESS NOTES
01/04/2022  Abelardo Sepulveda    Current provider:  Zulma Floyd MD    TXP LVAD INTERROGATIONS 1/4/2022 1/4/2022 1/3/2022 1/3/2022 1/3/2022 1/3/2022 1/3/2022   Type HeartMate3 HeartMate3 HeartMate3 HeartMate3 HeartMate3 HeartMate3 HeartMate3   Flow 4.6 4.6 4.8 4.6 4.8 4.8 4.7   Speed 5300 5300 5300 5300 5300 5300 53   PI 3.9 3.2 3.1 3.3 3.4 3.6 3.4   Power (Centeno) 3.8 3.9 3.9 4.0 3.8 3.8 3.8   LSL 4900 4900 4900 4900 4900 4900 -   Pulsatility Intermittent pulse Intermittent pulse Intermittent pulse Intermittent pulse Intermittent pulse Intermittent pulse Intermittent pulse          Rounded on Saba A Oren to ensure all mechanical assist device settings (IABP or VAD) were appropriate and all parameters were within limits.  I was able to ensure all back up equipment was present, the staff had no issues, and the Perfusion Department daily rounding was complete.    In emergency, the nursing units have been notified to contact the perfusion department either by:  Calling [phone] from 630am to 4pm Mon thru Fri, or by contacting the hospital  from 4pm to 630am and on weekends and asking to speak with the perfusionist on call.    7:10 AM

## 2022-01-04 NOTE — PLAN OF CARE
Plan of care reviewed with patient and all questions answered, verbalizes understanding. VSS, LVAD numbers and DP WNL. Denies any CP, SOB, palpitations, dizziness, pain and discomfort. Bed locked and in lowest position, side rails x2, personal belongings and call light within reach. Turning/repositioning independently in bed. Patient resting quietly. No acute distress noted.

## 2022-01-04 NOTE — PROGRESS NOTES
"Asked to see patient and place picture in epic of abd.   Pt reports this is the same wound he's had for a long time and its from a boil that popped.        LVAD DLES "1":      Pt asked if he is on the list for a new heart.  I told him I don't think so but he could ask his doctor tomorrow about it.  No further questions.     "

## 2022-01-04 NOTE — ASSESSMENT & PLAN NOTE
- Interval History was obtained from team member  during rounding today.  - Mobilization/Physical Therapy is ongoing.  - Anticoagulation held  - Admitted with sepsis   - WBC 19 from 26 on admit   - Still having temps   - INR goal 1.5-2 but AC on hold. INR currently 2.1  -   - Creatinine 1.4  - ID following for abx management. Culture from 1/1 + but 1/2 negative   - HTN meds on hold  - No acute changes     More than 50 percent of the care dominated counseling and coordinating care with different team members. The VAD was interrogated and significant findings noted above.

## 2022-01-04 NOTE — PROGRESS NOTES
[facility] - [facility]iology Stepdown  Cardiothoracic Surgery  Evaluation and Management/VAD interrogation       Patient Name: Saba Lott  MRN: [id number]  Admission Date: 12/31/2021  Hospital Length of Stay: 4 days  Code Status: Full Code   Attending Physician: Zulma Floyd MD   Referring Provider: Leslie, Provider  Principal Problem:Fever    Subjective:     Post-Op Info:  * No surgery found *         Subjective:     Interval History: NAEON. Still spiking temps. Cultures from 1/2/21 remain negative.     Implant 2/6/20    Medications:  Continuous Infusions:  Scheduled Meds:   atorvastatin  20 mg Oral QHS    DAPTOmycin (CUBICIN)  IV  10 mg/kg Intravenous Q24H    docusate sodium  100 mg Oral Daily    Lactobacillus rhamnosus GG  1 capsule Oral Daily    magnesium oxide  400 mg Oral BID    meropenem (MERREM) IVPB  2 g Intravenous Q8H    warfarin  7.5 mg Oral Daily     PRN Meds:acetaminophen, guaiFENesin 100 mg/5 ml     Objective:     Vital Signs (Most Recent):  Temp: (!) 100.8 °F (38.2 °C) (01/04/22 1119)  Pulse: (!) 117 (01/04/22 1119)  Resp: 16 (01/04/22 1119)  BP: (!) 76/0 (01/04/22 1119)  SpO2: 98 % (01/04/22 1119) Vital Signs (24h Range):  Temp:  [97.8 °F (36.6 °C)-101.1 °F (38.4 °C)] 100.8 °F (38.2 °C)  Pulse:  [104-120] 117  Resp:  [16-18] 16  SpO2:  [92 %-98 %] 98 %  BP: (74-82)/(0) 76/0       Intake/Output Summary (Last 24 hours) at 1/4/2022 1305  Last data filed at 1/3/2022 2000  Gross per 24 hour   Intake 240 ml   Output --   Net 240 ml       Physical Exam  Vitals reviewed.   Constitutional:       Appearance: He is well-developed.   HENT:      Head: Normocephalic and atraumatic.   Cardiovascular:      Rate and Rhythm: Regular rhythm. Tachycardia present.   Pulmonary:      Effort: Pulmonary effort is normal. No respiratory distress.   Skin:     Coloration: Skin is not pale.         Significant Labs:  ABGs: No results for input(s): PH, PCO2, PO2, HCO3, POCSATURATED, BE in the last 48 hours.  Amylase: No  results for input(s): AMYLASE in the last 48 hours.  BMP:   Recent Labs   Lab 01/04/22 0420      *   K 4.3   CL 94*   CO2 29   BUN 12   CREATININE 1.4   CALCIUM 8.6*   MG 2.2     Cardiac markers: No results for input(s): CKMB, CPKMB, TROPONINT, TROPONINI, MYOGLOBIN in the last 48 hours.  CBC:   Recent Labs   Lab 01/04/22 0420   WBC 19.85*   RBC 5.16   HGB 9.0*   HCT 31.2*   *   MCV 61*   MCH 17.4*   MCHC 28.8*     CMP:   Recent Labs   Lab 01/03/22 0517 01/03/22  0518 01/04/22 0420   GLU  --    < > 100   CALCIUM  --    < > 8.6*   ALBUMIN 2.4*  --   --    PROT 6.7  --   --    NA  --    < > 132*   K  --    < > 4.3   CO2  --    < > 29   CL  --    < > 94*   BUN  --    < > 12   CREATININE  --    < > 1.4   ALKPHOS 89  --   --    ALT 8*  --   --    AST 13  --   --    BILITOT 0.8  --   --     < > = values in this interval not displayed.     Coagulation:   Recent Labs   Lab 01/04/22 0420   INR 2.1*   APTT 38.6*     Lactic Acid: No results for input(s): LACTATE in the last 48 hours.  LFTs:   Recent Labs   Lab 01/03/22 0517   ALT 8*   AST 13   ALKPHOS 89   BILITOT 0.8   PROT 6.7   ALBUMIN 2.4*     Lipase: No results for input(s): LIPASE in the last 48 hours.    Significant Diagnostics:  I have reviewed all pertinent imaging results/findings within the past 24 hours.    Procedure: Device Interrogation Including analysis of device parameters  Current Settings: Ventricular Assist Device  Review of device function is stable  TXP LVAD INTERROGATIONS 1/4/2022 1/4/2022 1/4/2022 1/4/2022 1/3/2022 1/3/2022 1/3/2022   Type HeartMate3 HeartMate3 HeartMate3 HeartMate3 HeartMate3 HeartMate3 HeartMate3   Flow 4.7 4.7 4.6 4.6 4.8 4.6 4.8   Speed 5300 5300 5300 5300 5300 5300 5300   PI 3.4 3.3 3.9 3.2 3.1 3.3 3.4   Power (Centeno) 3.8 3.8 3.8 3.9 3.9 4.0 3.8   LSL 4900 4900 4900 4900 4900 4900 4900   Pulsatility Intermittent pulse Intermittent pulse Intermittent pulse Intermittent pulse Intermittent pulse Intermittent  pulse Intermittent pulse     Assessment/Plan:     LVAD (left ventricular assist device) present  - Interval History was obtained from team member  during rounding today.  - Mobilization/Physical Therapy is ongoing.  - Anticoagulation held  - Admitted with sepsis   - WBC 19 from 26 on admit   - Still having temps   - INR goal 1.5-2 but AC on hold. INR currently 2.1  -   - Creatinine 1.4  - ID following for abx management. Culture from 1/1 + but 1/2 negative   - HTN meds on hold  - No acute changes     More than 50 percent of the care dominated counseling and coordinating care with different team members. The VAD was interrogated and significant findings noted above.         Eunice Fagan PA-C  Cardiothoracic Surgery  [facility] - Cardiology Stepdown

## 2022-01-04 NOTE — SUBJECTIVE & OBJECTIVE
Interval History: TMax 101.1 F over the past 24 hours. ID following, recommend continuing IV dapto and meropenem. Pt reports increased drainage from midline abdominal wound x 2 days, culture pending. Repeat blood cultures NGTD. Cr remains mildly elevated at 1.4 with K of 4.3. Will discontinue aldactone 25 mg QD and continue to monitor.     Continuous Infusions:  Scheduled Meds:   atorvastatin  20 mg Oral QHS    DAPTOmycin (CUBICIN)  IV  10 mg/kg Intravenous Q24H    docusate sodium  100 mg Oral Daily    Lactobacillus rhamnosus GG  1 capsule Oral Daily    magnesium oxide  400 mg Oral BID    meropenem (MERREM) IVPB  2 g Intravenous Q8H    spironolactone  25 mg Oral Daily    warfarin  7.5 mg Oral Daily     PRN Meds:acetaminophen, guaiFENesin 100 mg/5 ml    Review of patient's allergies indicates:   Allergen Reactions    Iodine and iodide containing products      Objective:     Vital Signs (Most Recent):  Temp: (!) 100.8 °F (38.2 °C) (01/04/22 1119)  Pulse: (!) 117 (01/04/22 1119)  Resp: 16 (01/04/22 1119)  BP: (!) 76/0 (01/04/22 1119)  SpO2: 98 % (01/04/22 1119) Vital Signs (24h Range):  Temp:  [97.8 °F (36.6 °C)-101.1 °F (38.4 °C)] 100.8 °F (38.2 °C)  Pulse:  [104-120] 117  Resp:  [16-18] 16  SpO2:  [92 %-98 %] 98 %  BP: (74-82)/(0) 76/0     Patient Vitals for the past 72 hrs (Last 3 readings):   Weight   01/04/22 0800 88.4 kg (194 lb 14.2 oz)   01/03/22 0800 89.6 kg (197 lb 8.5 oz)   01/02/22 1700 89.5 kg (197 lb 5 oz)     Body mass index is 30.52 kg/m².      Intake/Output Summary (Last 24 hours) at 1/4/2022 1128  Last data filed at 1/3/2022 2000  Gross per 24 hour   Intake 240 ml   Output 400 ml   Net -160 ml       Hemodynamic Parameters:       Telemetry: ST    Physical Exam  Constitutional:       Appearance: Normal appearance.   HENT:      Head: Normocephalic and atraumatic.   Eyes:      Conjunctiva/sclera: Conjunctivae normal.   Neck:      Comments: Neck veins are not elevated  Cardiovascular:      Rate  and Rhythm: Regular rhythm. Tachycardia present.      Comments: Smooth VAD hum  Pulmonary:      Effort: Pulmonary effort is normal.      Breath sounds: Normal breath sounds.   Abdominal:      General: Bowel sounds are normal.      Palpations: Abdomen is soft. Small round wound 1 cm in diameter to right abdomen just off midline (not DLES) with scant amount of serous fluid drainage.     Musculoskeletal:         General: No swelling. Normal range of motion.      Cervical back: Normal range of motion and neck supple.   Skin:     General: Skin is warm and dry.      Capillary Refill: Capillary refill takes 2 to 3 seconds.   Neurological:      General: No focal deficit present.      Mental Status: He is alert and oriented to person, place, and time.   Psychiatric:         Mood and Affect: Mood normal.         Behavior: Behavior normal.         Thought Content: Thought content normal.         Judgment: Judgment normal.         Significant Labs:  CBC:  Recent Labs   Lab 01/02/22  0302 01/03/22  0518 01/04/22  0420   WBC 19.37* 19.44* 19.85*   RBC 4.67 4.90 5.16   HGB 8.0* 8.5* 9.0*   HCT 28.6* 30.2* 31.2*   * 585* 583*   MCV 61* 62* 61*   MCH 17.1* 17.3* 17.4*   MCHC 28.0* 28.1* 28.8*     BNP:  Recent Labs   Lab 01/01/22  0020 01/03/22  0517   * 400*     CMP:  Recent Labs   Lab 01/01/22  0020 01/01/22  1132 01/02/22  0302 01/03/22  0517 01/03/22  0518 01/04/22  0420      < > 113*  --  96 100   CALCIUM 8.5*   < > 8.5*  --  8.6* 8.6*   ALBUMIN 2.6*  --   --  2.4*  --   --    PROT 7.9  --   --  6.7  --   --    *   < > 132*  --  134* 132*   K 2.9*   < > 3.3*  --  3.9 4.3   CO2 33*   < > 29  --  32* 29   CL 86*   < > 92*  --  92* 94*   BUN 7   < > 13  --  14 12   CREATININE 0.9   < > 1.3  --  1.4 1.4   ALKPHOS 106  --   --  89  --   --    ALT 11  --   --  8*  --   --    AST 17  --   --  13  --   --    BILITOT 1.2*  --   --  0.8  --   --     < > = values in this interval not displayed.      Coagulation:    Recent Labs   Lab 01/02/22  0302 01/03/22  0518 01/04/22  0420   INR 2.4* 2.1* 2.1*   APTT 37.9* 38.9* 38.6*     LDH:  Recent Labs   Lab 01/01/22  1132 01/02/22  0302 01/03/22  0517 01/04/22  0420   * 254 229 360*     Microbiology:  Microbiology Results (last 7 days)     Procedure Component Value Units Date/Time    Aerobic culture [[id number]] Collected: 01/03/22 1822    Order Status: Completed Specimen: Wound from Abdomen Updated: 01/04/22 0912     Aerobic Bacterial Culture No growth    Blood culture [[id number]] Collected: 01/04/22 0425    Order Status: Sent Specimen: Blood Updated: 01/04/22 0857    Blood culture [[id number]] Collected: 01/01/22 0020    Order Status: Completed Specimen: Blood Updated: 01/04/22 0756     Blood Culture, Routine Gram stain aer bottle: Gram positive cocci in clusters resembling Staph       Results called to and read back by:DANIEL BLANCHARD 01/02/2022  23:29    Blood culture [[id number]] Collected: 01/01/22 0020    Order Status: Completed Specimen: Blood Updated: 01/04/22 0756     Blood Culture, Routine Gram stain aer bottle: Gram positive cocci       Results called to and read back by: DANIEL BLANCHARD RN  01/02/2022  22:36    Blood culture [[id number]] Collected: 01/04/22 0420    Order Status: Sent Specimen: Blood Updated: 01/04/22 0609    Blood culture [[id number]] Collected: 01/03/22 1723    Order Status: Completed Specimen: Blood Updated: 01/04/22 0145     Blood Culture, Routine No Growth to date    Blood culture [[id number]] Collected: 01/03/22 1723    Order Status: Completed Specimen: Blood Updated: 01/04/22 0145     Blood Culture, Routine No Growth to date    Blood culture [[id number]] Collected: 01/02/22 1946    Order Status: Completed Specimen: Blood Updated: 01/03/22 2212     Blood Culture, Routine No Growth to date      No Growth to date    Blood culture [[id number]] Collected: 01/02/22 1946    Order Status: Completed Specimen: Blood Updated: 01/03/22 2212     Blood Culture, Routine  No Growth to date      No Growth to date    Respiratory Infection Panel (PCR), Nasopharyngeal [[id number]] Collected: 01/03/22 1822    Order Status: Completed Specimen: Nasopharyngeal Swab Updated: 01/03/22 2107     Respiratory Infection Panel Source NP Swab     Adenovirus Not Detected     Coronavirus 229E, Common Cold Virus Not Detected     Coronavirus HKU1, Common Cold Virus Not Detected     Coronavirus NL63, Common Cold Virus Not Detected     Coronavirus OC43, Common Cold Virus Not Detected     Comment: The Coronavirus strains detected in this test cause the common cold.  These strains are not the COVID-19 (novel Coronavirus)strain   associated with the respiratory disease outbreak.          Human Metapneumovirus Not Detected     Human Rhinovirus/Enterovirus Not Detected     Influenza A (subtypes H1, H1-2009,H3) Not Detected     Influenza B Not Detected     Parainfluenza Virus 1 Not Detected     Parainfluenza Virus 2 Not Detected     Parainfluenza Virus 3 Not Detected     Parainfluenza Virus 4 Not Detected     Respiratory Syncytial Virus Not Detected     Bordetella Parapertussis (OL8853) Not Detected     Bordetella pertussis (ptxP) Not Detected     Chlamydia pneumoniae Not Detected     Mycoplasma pneumoniae Not Detected    Narrative:      For all other respiratory sources, order [id number] -  Respiratory Viral Panel by PCR    Culture, Anaerobe [[id number]] Collected: 01/03/22 1822    Order Status: Sent Specimen: Wound from Abdomen Updated: 01/03/22 1933          I have reviewed all pertinent labs within the past 24 hours.    Estimated Creatinine Clearance: 61.8 mL/min (based on SCr of 1.4 mg/dL).    Diagnostic Results:  I have reviewed all pertinent imaging results/findings within the past 24 hours.I

## 2022-01-04 NOTE — ASSESSMENT & PLAN NOTE
-K today 3.9  -Aldactone was increased to 50 mg qd 1/1 bc of hypokalemia. As sCr still above baseline at 1.4, decreased back to 25 mg qd yesterday  - cr remains elevated at 1.4 with K of 4.3, will discontinue aldactone 25

## 2022-01-04 NOTE — PLAN OF CARE
AAOx4.  No SOB or other distress.  No complaints of pain or otherwise at this time.  Free of falls, traumas, and injuries.  Temp elevated at 101.1 this afternoon; notified Dr. Mustafa; prn tylenol given and wound culture, covid swab, and respiratory infection panel collected and sent to lab. Blood cultures collected by phlebotomy. Patient instructed to provide urine for culture. Vital signs stable otherwise. LVAD dressing changed on this shift. Plan of care reviewed with patient.  Will continue to monitor.       Satisfactory

## 2022-01-04 NOTE — ASSESSMENT & PLAN NOTE
Patient ready for discharge. Discharge instructions reviewed with patient and family. Understanding verbalized.    Symptoms on admit: cough, chills, sputum production, nausea, vomiting x 1, weakness  -  Temp 103 12/31. 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 Site with no drainage  - Blood cxs from 1/1 with with GPC. Repeat Blood cxs NGTD. Wound cultures pending   - Appreciate ID's help. --switched vanc to dapto 1/1 given hx of VISA. Cefepime switched to Meropenem 1/2

## 2022-01-04 NOTE — SUBJECTIVE & OBJECTIVE
Interval History:   No acute events overnight.  T max 100.8  Blood cultures 1/1 = + GPC resembling Staph.  ID pending  Blood cx 1/2, 1/3 NGTD.  1/4 pending.   COVID and RIP negative  Abdominal wound cultures pending - NGTD   Reports he feels better today. Poor appetite  Remains on IV daptomycin and meropenem      Review of Systems   Constitutional: Positive for activity change, appetite change (poor appetite) and fever. Negative for chills, diaphoresis and fatigue.   HENT: Negative for rhinorrhea and sore throat.    Eyes: Negative.    Respiratory: Positive for cough. Negative for shortness of breath.    Cardiovascular: Negative for chest pain and leg swelling.   Gastrointestinal: Negative for abdominal pain, diarrhea, nausea and vomiting.   Genitourinary: Negative for difficulty urinating, dysuria and hematuria.   Musculoskeletal: Negative for arthralgias and myalgias.   Skin: Positive for wound (open wound epigastric area). Negative for rash.   Neurological: Negative for dizziness and headaches.   Psychiatric/Behavioral: Negative for agitation. The patient is not nervous/anxious.      Objective:     Vital Signs (Most Recent):  Temp: 98.6 °F (37 °C) (01/04/22 0730)  Pulse: (!) 120 (01/04/22 1000)  Resp: 18 (01/04/22 0730)  BP: (!) 82/0 (01/04/22 0730)  SpO2: 97 % (01/04/22 0839) Vital Signs (24h Range):  Temp:  [97.8 °F (36.6 °C)-101.1 °F (38.4 °C)] 98.6 °F (37 °C)  Pulse:  [104-120] 120  Resp:  [18] 18  SpO2:  [92 %-97 %] 97 %  BP: (74-82)/(0) 82/0     Weight: 88.4 kg (194 lb 14.2 oz)  Body mass index is 30.52 kg/m².    Estimated Creatinine Clearance: 61.8 mL/min (based on SCr of 1.4 mg/dL).    Physical Exam  Vitals reviewed.   Constitutional:       General: He is not in acute distress.     Appearance: He is well-developed. He is obese. He is not ill-appearing, toxic-appearing or diaphoretic.   HENT:      Head: Normocephalic and atraumatic.   Eyes:      General: No scleral icterus.     Conjunctiva/sclera:  Conjunctivae normal.   Cardiovascular:      Comments: VAD hum  Pulmonary:      Effort: Pulmonary effort is normal. No respiratory distress.      Breath sounds: No wheezing or rales.   Abdominal:      General: There is no distension.      Palpations: Abdomen is soft.      Tenderness: There is no abdominal tenderness. There is no guarding.      Comments: Epigastric wound dressed- c/d/i   DLES site dressed - c/d/i    Photos of today reviewed. See below   Musculoskeletal:         General: Normal range of motion.      Cervical back: Normal range of motion.      Right lower leg: No edema.      Left lower leg: No edema.   Skin:     General: Skin is warm and dry.      Findings: No erythema or rash.   Neurological:      Mental Status: He is alert and oriented to person, place, and time.   Psychiatric:         Behavior: Behavior normal.                 Significant Labs: All pertinent labs within the past 24 hours have been reviewed.    Significant Imaging: I have reviewed all pertinent imaging results/findings within the past 24 hours.

## 2022-01-04 NOTE — SUBJECTIVE & OBJECTIVE
Subjective:     Interval History: NAEON. Still spiking temps. Cultures from 1/2/21 remain negative.     Implant 2/6/20    Medications:  Continuous Infusions:  Scheduled Meds:   atorvastatin  20 mg Oral QHS    DAPTOmycin (CUBICIN)  IV  10 mg/kg Intravenous Q24H    docusate sodium  100 mg Oral Daily    Lactobacillus rhamnosus GG  1 capsule Oral Daily    magnesium oxide  400 mg Oral BID    meropenem (MERREM) IVPB  2 g Intravenous Q8H    warfarin  7.5 mg Oral Daily     PRN Meds:acetaminophen, guaiFENesin 100 mg/5 ml     Objective:     Vital Signs (Most Recent):  Temp: (!) 100.8 °F (38.2 °C) (01/04/22 1119)  Pulse: (!) 117 (01/04/22 1119)  Resp: 16 (01/04/22 1119)  BP: (!) 76/0 (01/04/22 1119)  SpO2: 98 % (01/04/22 1119) Vital Signs (24h Range):  Temp:  [97.8 °F (36.6 °C)-101.1 °F (38.4 °C)] 100.8 °F (38.2 °C)  Pulse:  [104-120] 117  Resp:  [16-18] 16  SpO2:  [92 %-98 %] 98 %  BP: (74-82)/(0) 76/0       Intake/Output Summary (Last 24 hours) at 1/4/2022 1305  Last data filed at 1/3/2022 2000  Gross per 24 hour   Intake 240 ml   Output --   Net 240 ml       Physical Exam  Vitals reviewed.   Constitutional:       Appearance: He is well-developed.   HENT:      Head: Normocephalic and atraumatic.   Cardiovascular:      Rate and Rhythm: Regular rhythm. Tachycardia present.   Pulmonary:      Effort: Pulmonary effort is normal. No respiratory distress.   Skin:     Coloration: Skin is not pale.         Significant Labs:  ABGs: No results for input(s): PH, PCO2, PO2, HCO3, POCSATURATED, BE in the last 48 hours.  Amylase: No results for input(s): AMYLASE in the last 48 hours.  BMP:   Recent Labs   Lab 01/04/22  0420      *   K 4.3   CL 94*   CO2 29   BUN 12   CREATININE 1.4   CALCIUM 8.6*   MG 2.2     Cardiac markers: No results for input(s): CKMB, CPKMB, TROPONINT, TROPONINI, MYOGLOBIN in the last 48 hours.  CBC:   Recent Labs   Lab 01/04/22  0420   WBC 19.85*   RBC 5.16   HGB 9.0*   HCT 31.2*   *   MCV  61*   MCH 17.4*   MCHC 28.8*     CMP:   Recent Labs   Lab 01/03/22  0517 01/03/22  0518 01/04/22  0420   GLU  --    < > 100   CALCIUM  --    < > 8.6*   ALBUMIN 2.4*  --   --    PROT 6.7  --   --    NA  --    < > 132*   K  --    < > 4.3   CO2  --    < > 29   CL  --    < > 94*   BUN  --    < > 12   CREATININE  --    < > 1.4   ALKPHOS 89  --   --    ALT 8*  --   --    AST 13  --   --    BILITOT 0.8  --   --     < > = values in this interval not displayed.     Coagulation:   Recent Labs   Lab 01/04/22  0420   INR 2.1*   APTT 38.6*     Lactic Acid: No results for input(s): LACTATE in the last 48 hours.  LFTs:   Recent Labs   Lab 01/03/22  0517   ALT 8*   AST 13   ALKPHOS 89   BILITOT 0.8   PROT 6.7   ALBUMIN 2.4*     Lipase: No results for input(s): LIPASE in the last 48 hours.    Significant Diagnostics:  I have reviewed all pertinent imaging results/findings within the past 24 hours.    Procedure: Device Interrogation Including analysis of device parameters  Current Settings: Ventricular Assist Device  Review of device function is stable  TXP LVAD INTERROGATIONS 1/4/2022 1/4/2022 1/4/2022 1/4/2022 1/3/2022 1/3/2022 1/3/2022   Type HeartMate3 HeartMate3 HeartMate3 HeartMate3 HeartMate3 HeartMate3 HeartMate3   Flow 4.7 4.7 4.6 4.6 4.8 4.6 4.8   Speed 5300 5300 5300 5300 5300 5300 5300   PI 3.4 3.3 3.9 3.2 3.1 3.3 3.4   Power (Centeno) 3.8 3.8 3.8 3.9 3.9 4.0 3.8   LSL 4900 4900 4900 4900 4900 4900 4900   Pulsatility Intermittent pulse Intermittent pulse Intermittent pulse Intermittent pulse Intermittent pulse Intermittent pulse Intermittent pulse

## 2022-01-04 NOTE — PT/OT/SLP PROGRESS
Occupational Therapy      Patient Name:  Saba Lott   MRN:  [id number]    Patient not seen today secondary to Nursing care upon OT attempt. Will follow-up as scheduled.    1/4/2022
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
## 2022-01-04 NOTE — PROGRESS NOTES
[facility] - Cardiology Stepdown  Infectious Disease  Progress Note    Patient Name: Saba Lott  MRN: [id number]  Admission Date: 12/31/2021  Length of Stay: 3 days  Attending Physician: Zulma Floyd MD  Primary Care Provider: Mary Lombardi MD    Isolation Status: No active isolations  Assessment/Plan:      * Fever  56-year-old male with history of DCM s/p LVAD 2/6/2020, left occipital IPH, lead endocarditis s/p removal, history of VISA / Pseudomonas bacteremia on chronic suppressive doxy, presents from clinic with fevers.    Blood cultures of 1/1 - 2/4 bottles + GPC, ID pending.   Blood cultures 1/2 - NGTD     CT A/P 1/2 - unchanged small region of soft tissue induration and slight skin thickening about the drive line in anterior abdominal wall. No definite focal fluid collection about the LVAD. No definite new regions of soft tissue induration or focal fluid collections are identified about the remaining subcutaneous course of the LVAD driveline  Noted patchy LLL GGO and airspace opacities with additional patchy ground glass attenuation in DEVORAH  Slight non-specific perinephric fat stranding - U/A wnl    Persistent fevers - repeat blood cultures, DLES cultures, COVID, RIP panel, procal ordered by Primary Team.  Patient reports feeling better today than yesterday.  Denies cough, SOB. O2 sats 92-98% on RA.  Active drainage from epigastric wound    Plan/recommendations:  1.  Repeat blood cultures ordered for am.  2.  Continue IV daptomycin 10 mg/kg for now  3.  Continue IV meropenem 2 g q 8 hours for now  4.  Will follow blood cultures, DLES cultures and adjust abx accordingly.    5.  Will follow.     Data reviewed and plan discussed with ID staff, Dr. Kim            Thank you.   Please call for any questions or concerns.  Lindsay Rowan, APRN, ANP-C  Spectra [phone]    Subjective:     Principal Problem:Fever    HPI: Mr. 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 [place] concern for [place] found to have polymicrobial GNR bacteremia s/p approx 6 wk zosyn (stopped 11/16) presented with with fever from clinic and was admitted for presumed sepsis. Pt reports feeling weak since day prior, fevers, chills, diarrhea and vomiting x 1. Denies sore throat, HA, abdominal pain, dysuria, sob or cough. No sick contacts. No increased drainage from DLES. Lives alone.    In the ED pt febrile, tachycardic and with WBC 20. CXR with b/l edema.           Interval History:   Continues to have fevers.  Denies chills, sweats.  Dariel cough, SOB  WBC 19  Blood cultures 1/1 2/4 bottles positive for GPC, ID pending.   Blood cx 1/2 NGTD  On dapto/meropenem  Patient reports he feels somewhat better today.     CT A/P 1/2:    1. Stably positioned LVAD with relatively unchanged small region of soft tissue induration and slight skin thickening about the drive line in the anterior abdominal wall as discussed above.  Findings do not appear appreciably changed from prior CT examinations.   2. Left basilar ground-glass and airspace opacities with additional slight ground-glass attenuation in the left upper lobe.  Findings concerning for possible underlying infectious process with differential considerations to include non infectious inflammatory etiology or asymmetric pulmonary edema.  3. Mild nonspecific mediastinal adenopathy, slightly increased from prior study.   4. Cholelithiasis.   5. Nonspecific bilateral perinephric fat stranding, correlation with urinalysis advised.   6. Diverticulosis without evidence to suggest acute diverticulitis.   7. Additional findings as discussed above.      Review of Systems   Constitutional: Positive for activity change and fever. Negative for chills, diaphoresis  and fatigue.   HENT: Negative for rhinorrhea and sore throat.    Eyes: Negative.    Respiratory: Negative for cough and shortness of breath.    Cardiovascular: Negative for chest pain and leg swelling.   Gastrointestinal: Negative for abdominal pain, diarrhea, nausea and vomiting.   Genitourinary: Negative for difficulty urinating, dysuria and hematuria.   Musculoskeletal: Negative for arthralgias and myalgias.   Skin: Positive for wound (open wound upper mid abdomen). Negative for rash.   Neurological: Negative for dizziness and headaches.   Psychiatric/Behavioral: Negative for agitation. The patient is not nervous/anxious.      Objective:     Vital Signs (Most Recent):  Temp: (!) 100.6 °F (38.1 °C) (01/03/22 0726)  Pulse: (!) 118 (01/03/22 1027)  Resp: 16 (01/03/22 0726)  BP: (!) 82/0 (01/03/22 0726)  SpO2: 97 % (01/03/22 0726) Vital Signs (24h Range):  Temp:  [91.8 °F (33.2 °C)-101 °F (38.3 °C)] 100.6 °F (38.1 °C)  Pulse:  [] 118  Resp:  [16-18] 16  SpO2:  [95 %-98 %] 97 %  BP: ()/(0-45) 82/0     Weight: 89.6 kg (197 lb 8.5 oz)  Body mass index is 30.93 kg/m².    Estimated Creatinine Clearance: 62.2 mL/min (based on SCr of 1.4 mg/dL).    Physical Exam  Vitals reviewed.   Constitutional:       General: He is not in acute distress.     Appearance: He is well-developed. He is obese. He is not ill-appearing, toxic-appearing or diaphoretic.   HENT:      Head: Normocephalic and atraumatic.   Eyes:      General: No scleral icterus.     Conjunctiva/sclera: Conjunctivae normal.   Cardiovascular:      Comments: VAD hum  Pulmonary:      Effort: Pulmonary effort is normal. No respiratory distress.      Breath sounds: No wheezing or rales.   Abdominal:      General: There is no distension.      Palpations: Abdomen is soft.      Tenderness: There is no abdominal tenderness. There is no guarding.      Comments: Epigastric wound dressed - moderate amount drainage on dressing.    DLES site dressed - c/d/i    Musculoskeletal:         General: Normal range of motion.      Cervical back: Normal range of motion.      Right lower leg: No edema.      Left lower leg: No edema.   Skin:     General: Skin is warm and dry.      Findings: No erythema or rash.   Neurological:      Mental Status: He is alert and oriented to person, place, and time.   Psychiatric:         Behavior: Behavior normal.         Significant Labs: All pertinent labs within the past 24 hours have been reviewed.    Significant Imaging: I have reviewed all pertinent imaging results/findings within the past 24 hours.

## 2022-01-04 NOTE — PROGRESS NOTES
[facility] - [facility]  Heart Transplant  Progress Note    Patient Name: Saba Lott  MRN: [id number]  Admission Date: 12/31/2021  Hospital Length of Stay: 4 days  Attending Physician: Zulma Floyd MD  Primary Care Provider: Mary Lombardi MD  Principal Problem:Fever    Subjective:     Interval History: TMax 101.1 F over the past 24 hours. ID following, recommend continuing IV dapto and meropenem. Pt reports increased drainage from midline abdominal wound x 2 days, culture pending. Repeat blood cultures NGTD. Cr remains mildly elevated at 1.4 with K of 4.3. Will discontinue aldactone 25 mg QD and continue to monitor.     Continuous Infusions:  Scheduled Meds:   atorvastatin  20 mg Oral QHS    DAPTOmycin (CUBICIN)  IV  10 mg/kg Intravenous Q24H    docusate sodium  100 mg Oral Daily    Lactobacillus rhamnosus GG  1 capsule Oral Daily    magnesium oxide  400 mg Oral BID    meropenem (MERREM) IVPB  2 g Intravenous Q8H    spironolactone  25 mg Oral Daily    warfarin  7.5 mg Oral Daily     PRN Meds:acetaminophen, guaiFENesin 100 mg/5 ml    Review of patient's allergies indicates:   Allergen Reactions    Iodine and iodide containing products      Objective:     Vital Signs (Most Recent):  Temp: (!) 100.8 °F (38.2 °C) (01/04/22 1119)  Pulse: (!) 117 (01/04/22 1119)  Resp: 16 (01/04/22 1119)  BP: (!) 76/0 (01/04/22 1119)  SpO2: 98 % (01/04/22 1119) Vital Signs (24h Range):  Temp:  [97.8 °F (36.6 °C)-101.1 °F (38.4 °C)] 100.8 °F (38.2 °C)  Pulse:  [104-120] 117  Resp:  [16-18] 16  SpO2:  [92 %-98 %] 98 %  BP: (74-82)/(0) 76/0     Patient Vitals for the past 72 hrs (Last 3 readings):   Weight   01/04/22 0800 88.4 kg (194 lb 14.2 oz)   01/03/22 0800 89.6 kg (197 lb 8.5 oz)   01/02/22 1700 89.5 kg (197 lb 5 oz)     Body mass index is 30.52 kg/m².      Intake/Output Summary (Last 24 hours) at 1/4/2022 1128  Last data filed at 1/3/2022 2000  Gross per 24 hour   Intake 240 ml   Output 400 ml   Net -160 ml        Hemodynamic Parameters:       Telemetry: ST    Physical Exam  Constitutional:       Appearance: Normal appearance.   HENT:      Head: Normocephalic and atraumatic.   Eyes:      Conjunctiva/sclera: Conjunctivae normal.   Neck:      Comments: Neck veins are not elevated  Cardiovascular:      Rate and Rhythm: Regular rhythm. Tachycardia present.      Comments: Smooth VAD hum  Pulmonary:      Effort: Pulmonary effort is normal.      Breath sounds: Normal breath sounds.   Abdominal:      General: Bowel sounds are normal.      Palpations: Abdomen is soft. Small round wound 1 cm in diameter to right abdomen just off midline (not DLES) with scant amount of serous fluid drainage.     Musculoskeletal:         General: No swelling. Normal range of motion.      Cervical back: Normal range of motion and neck supple.   Skin:     General: Skin is warm and dry.      Capillary Refill: Capillary refill takes 2 to 3 seconds.   Neurological:      General: No focal deficit present.      Mental Status: He is alert and oriented to person, place, and time.   Psychiatric:         Mood and Affect: Mood normal.         Behavior: Behavior normal.         Thought Content: Thought content normal.         Judgment: Judgment normal.         Significant Labs:  CBC:  Recent Labs   Lab 01/02/22  0302 01/03/22 0518 01/04/22  0420   WBC 19.37* 19.44* 19.85*   RBC 4.67 4.90 5.16   HGB 8.0* 8.5* 9.0*   HCT 28.6* 30.2* 31.2*   * 585* 583*   MCV 61* 62* 61*   MCH 17.1* 17.3* 17.4*   MCHC 28.0* 28.1* 28.8*     BNP:  Recent Labs   Lab 01/01/22  0020 01/03/22  0517   * 400*     CMP:  Recent Labs   Lab 01/01/22  0020 01/01/22  1132 01/02/22  0302 01/03/22  0517 01/03/22  0518 01/04/22  0420      < > 113*  --  96 100   CALCIUM 8.5*   < > 8.5*  --  8.6* 8.6*   ALBUMIN 2.6*  --   --  2.4*  --   --    PROT 7.9  --   --  6.7  --   --    *   < > 132*  --  134* 132*   K 2.9*   < > 3.3*  --  3.9 4.3   CO2 33*   < > 29  --  32* 29   CL  86*   < > 92*  --  92* 94*   BUN 7   < > 13  --  14 12   CREATININE 0.9   < > 1.3  --  1.4 1.4   ALKPHOS 106  --   --  89  --   --    ALT 11  --   --  8*  --   --    AST 17  --   --  13  --   --    BILITOT 1.2*  --   --  0.8  --   --     < > = values in this interval not displayed.      Coagulation:   Recent Labs   Lab 01/02/22  0302 01/03/22  0518 01/04/22  0420   INR 2.4* 2.1* 2.1*   APTT 37.9* 38.9* 38.6*     LDH:  Recent Labs   Lab 01/01/22  1132 01/02/22  0302 01/03/22  0517 01/04/22  0420   * 254 229 360*     Microbiology:  Microbiology Results (last 7 days)     Procedure Component Value Units Date/Time    Aerobic culture [[id number]] Collected: 01/03/22 1822    Order Status: Completed Specimen: Wound from Abdomen Updated: 01/04/22 0912     Aerobic Bacterial Culture No growth    Blood culture [[id number]] Collected: 01/04/22 0425    Order Status: Sent Specimen: Blood Updated: 01/04/22 0857    Blood culture [[id number]] Collected: 01/01/22 0020    Order Status: Completed Specimen: Blood Updated: 01/04/22 0756     Blood Culture, Routine Gram stain aer bottle: Gram positive cocci in clusters resembling Staph       Results called to and read back by:DANIEL BLANCHARD 01/02/2022  23:29    Blood culture [[id number]] Collected: 01/01/22 0020    Order Status: Completed Specimen: Blood Updated: 01/04/22 0756     Blood Culture, Routine Gram stain aer bottle: Gram positive cocci       Results called to and read back by: DANIEL BLANCHARD RN  01/02/2022  22:36    Blood culture [[id number]] Collected: 01/04/22 0420    Order Status: Sent Specimen: Blood Updated: 01/04/22 0609    Blood culture [[id number]] Collected: 01/03/22 1723    Order Status: Completed Specimen: Blood Updated: 01/04/22 0145     Blood Culture, Routine No Growth to date    Blood culture [[id number]] Collected: 01/03/22 1723    Order Status: Completed Specimen: Blood Updated: 01/04/22 0145     Blood Culture, Routine No Growth to date    Blood culture [[id number]]  Collected: 01/02/22 1946    Order Status: Completed Specimen: Blood Updated: 01/03/22 2212     Blood Culture, Routine No Growth to date      No Growth to date    Blood culture [[id number]] Collected: 01/02/22 1946    Order Status: Completed Specimen: Blood Updated: 01/03/22 2212     Blood Culture, Routine No Growth to date      No Growth to date    Respiratory Infection Panel (PCR), Nasopharyngeal [[id number]] Collected: 01/03/22 1822    Order Status: Completed Specimen: Nasopharyngeal Swab Updated: 01/03/22 2107     Respiratory Infection Panel Source NP Swab     Adenovirus Not Detected     Coronavirus 229E, Common Cold Virus Not Detected     Coronavirus HKU1, Common Cold Virus Not Detected     Coronavirus NL63, Common Cold Virus Not Detected     Coronavirus OC43, Common Cold Virus Not Detected     Comment: The Coronavirus strains detected in this test cause the common cold.  These strains are not the COVID-19 (novel Coronavirus)strain   associated with the respiratory disease outbreak.          Human Metapneumovirus Not Detected     Human Rhinovirus/Enterovirus Not Detected     Influenza A (subtypes H1, H1-2009,H3) Not Detected     Influenza B Not Detected     Parainfluenza Virus 1 Not Detected     Parainfluenza Virus 2 Not Detected     Parainfluenza Virus 3 Not Detected     Parainfluenza Virus 4 Not Detected     Respiratory Syncytial Virus Not Detected     Bordetella Parapertussis (RI0995) Not Detected     Bordetella pertussis (ptxP) Not Detected     Chlamydia pneumoniae Not Detected     Mycoplasma pneumoniae Not Detected    Narrative:      For all other respiratory sources, order [id number] -  Respiratory Viral Panel by PCR    Culture, Anaerobe [[id number]] Collected: 01/03/22 1822    Order Status: Sent Specimen: Wound from Abdomen Updated: 01/03/22 1933          I have reviewed all pertinent labs within the past 24 hours.    Estimated Creatinine Clearance: 61.8 mL/min (based on SCr of 1.4 mg/dL).    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 Patient is short of breath and uncomfortable at the time of giving history. He reports chills, weakness, cough, sputum production, weight gain, nausea, vomiting and trouble breathing. He went to his PCP office yesterday where his temp was 103 and was sent to the hospital afterwards. His white count is 20 on arrival. He is unable to lay flat. His HR is 102-142 sinus. He needs to be diuresed upon arrival but K is 2.9, ordered IV and PO Kcl, he vomited out oral potassium, IV replacement will be done.       * Fever  Symptoms on admit: cough, chills, sputum production, nausea, vomiting x 1, weakness  -  Temp 103 12/31. 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 Site with no drainage  - Blood cxs from 1/1 with with GPC. Repeat Blood cxs NGTD. Wound cultures pending   - Appreciate ID's help. --switched vanc to dapto 1/1 given hx of VISA. Cefepime switched to Meropenem 1/2            Sepsis  -See fever    Hypokalemia  -K today 3.9  -Aldactone was increased to 50 mg qd 1/1 bc of hypokalemia. As sCr still above baseline at 1.4, decreased back to 25 mg qd yesterday  - cr remains elevated at 1.4 with K of 4.3, will discontinue aldactone 25     LVAD (left ventricular assist device) present  -S/P DT HM3 with MVR 2/2/20  -Current speed 5300  -INR goal 1.5-2.0. INR  2.1 since Coumadin put on hold on admit. PharmD to manage Coumadin  -LDH stable  -ECHO 10/9/21 with EF 20%, BRYAN, IVS bows into RV, G1DD, s/p Alferi stitch, mild-moderate RVSF, CVP 3, LVEDD 6.43 with LVAD speed set to 5300 rpm        Procedure: Device Interrogation Including analysis of device parameters  Current Settings: Ventricular Assist Device  Review of device function is stable      TXP LVAD INTERROGATIONS 1/4/2022 1/4/2022 1/4/2022 1/4/2022 1/3/2022 1/3/2022 1/3/2022   Type HeartMate3 HeartMate3 HeartMate3 HeartMate3 HeartMate3 HeartMate3 HeartMate3   Flow 4.7 4.7 4.6 4.6 4.8 4.6 4.8   Speed 5300 5300 5300 5300 5300 5300 5300   PI 3.4 3.3 3.9 3.2 3.1 3.3 3.4   Power (Centeno) 3.8 3.8 3.8 3.9 3.9 4.0 3.8   LSL 4900 4900 4900 4900 4900 4900 4900   Pulsatility Intermittent pulse Intermittent pulse Intermittent pulse Intermittent pulse Intermittent pulse Intermittent pulse Intermittent pulse       Melissa Olson PA-C  Heart Transplant  [facility]

## 2022-01-04 NOTE — ASSESSMENT & PLAN NOTE
-S/P DT HM3 with MVR 2/2/20  -Current speed 5300  -INR goal 1.5-2.0. INR 2.1 since Coumadin put on hold on admit. PharmD to manage Coumadin  -LDH stable  -ECHO 10/9/21 with EF 20%, BRYAN, IVS bows into RV, G1DD, s/p Alferi stitch, mild-moderate RVSF, CVP 3, LVEDD 6.43 with LVAD speed set to 5300 rpm        Procedure: Device Interrogation Including analysis of device parameters  Current Settings: Ventricular Assist Device  Review of device function is stable      TXP LVAD INTERROGATIONS 1/4/2022 1/4/2022 1/4/2022 1/4/2022 1/3/2022 1/3/2022 1/3/2022   Type HeartMate3 HeartMate3 HeartMate3 HeartMate3 HeartMate3 HeartMate3 HeartMate3   Flow 4.7 4.7 4.6 4.6 4.8 4.6 4.8   Speed 5300 5300 5300 5300 5300 5300 5300   PI 3.4 3.3 3.9 3.2 3.1 3.3 3.4   Power (Centeno) 3.8 3.8 3.8 3.9 3.9 4.0 3.8   LSL 4900 4900 4900 4900 4900 4900 4900   Pulsatility Intermittent pulse Intermittent pulse Intermittent pulse Intermittent pulse Intermittent pulse Intermittent pulse Intermittent pulse

## 2022-01-05 NOTE — SUBJECTIVE & OBJECTIVE
Interval History: TMax 100.8 F over the past 24 hours. ID following, recommend continuing IV dapto and meropenem. Significant cough this morning, will add DuoNeb treatments and antitussives.     Continuous Infusions:  Scheduled Meds:   albuterol-ipratropium  3 mL Nebulization Q4H    atorvastatin  20 mg Oral QHS    DAPTOmycin (CUBICIN)  IV  10 mg/kg Intravenous Q24H    docusate sodium  100 mg Oral Daily    Lactobacillus rhamnosus GG  1 capsule Oral Daily    magnesium oxide  400 mg Oral BID    meropenem (MERREM) IVPB  2 g Intravenous Q8H    mupirocin   Nasal BID    warfarin  7.5 mg Oral Daily     PRN Meds:acetaminophen, benzonatate, guaiFENesin 100 mg/5 ml    Review of patient's allergies indicates:   Allergen Reactions    Iodine and iodide containing products      Objective:     Vital Signs (Most Recent):  Temp: 98.3 °F (36.8 °C) (01/05/22 0723)  Pulse: 109 (01/05/22 0724)  Resp: 20 (01/05/22 0723)  BP: (!) 76/0 (01/05/22 0755)  SpO2: 97 % (01/05/22 0723) Vital Signs (24h Range):  Temp:  [98.3 °F (36.8 °C)-100.8 °F (38.2 °C)] 98.3 °F (36.8 °C)  Pulse:  [] 109  Resp:  [16-20] 20  SpO2:  [93 %-98 %] 97 %  BP: (74-76)/(0) 76/0     Patient Vitals for the past 72 hrs (Last 3 readings):   Weight   01/04/22 0800 88.4 kg (194 lb 14.2 oz)   01/03/22 0800 89.6 kg (197 lb 8.5 oz)   01/02/22 1700 89.5 kg (197 lb 5 oz)     Body mass index is 30.52 kg/m².      Intake/Output Summary (Last 24 hours) at 1/5/2022 0819  Last data filed at 1/5/2022 0754  Gross per 24 hour   Intake 862.5 ml   Output 1025 ml   Net -162.5 ml       Hemodynamic Parameters:       Telemetry: ST    Physical Exam  Constitutional:       Appearance: Normal appearance.   HENT:      Head: Normocephalic and atraumatic.   Eyes:      Conjunctiva/sclera: Conjunctivae normal.   Neck:      Comments: Neck veins are not elevated  Cardiovascular:      Rate and Rhythm: Regular rhythm. Tachycardia present.      Comments: Smooth VAD hum  Pulmonary:      Effort:  Pulmonary effort is normal.      Breath sounds: Normal breath sounds.   Abdominal:      General: Bowel sounds are normal.      Palpations: Abdomen is soft. Small round wound 1 cm in diameter to right abdomen just off midline (not DLES) with scant amount of serous fluid drainage on dressing.     Musculoskeletal:         General: No swelling. Normal range of motion.      Cervical back: Normal range of motion and neck supple.   Skin:     General: Skin is warm and dry.      Capillary Refill: Capillary refill takes 2 to 3 seconds.   Neurological:      General: No focal deficit present.      Mental Status: He is alert and oriented to person, place, and time.   Psychiatric:         Mood and Affect: Mood normal.         Behavior: Behavior normal.         Thought Content: Thought content normal.         Judgment: Judgment normal.         Significant Labs:  CBC:  Recent Labs   Lab 01/03/22  0518 01/04/22  0420 01/05/22  0507   WBC 19.44* 19.85* 22.11*   RBC 4.90 5.16 4.60   HGB 8.5* 9.0* 8.0*   HCT 30.2* 31.2* 28.2*   * 583* 545*   MCV 62* 61* 61*   MCH 17.3* 17.4* 17.4*   MCHC 28.1* 28.8* 28.4*     BNP:  Recent Labs   Lab 01/01/22  0020 01/03/22  0517 01/05/22  0507   * 400* 381*     CMP:  Recent Labs   Lab 01/01/22  0020 01/01/22  1132 01/02/22  0302 01/03/22  0517 01/03/22  0518 01/04/22  0420 01/05/22  0507      < >   < >  --  96 100 101   CALCIUM 8.5*   < >   < >  --  8.6* 8.6* 8.3*   ALBUMIN 2.6*  --   --  2.4*  --   --  2.3*   PROT 7.9  --   --  6.7  --   --  6.6   *   < >   < >  --  134* 132* 131*   K 2.9*   < >   < >  --  3.9 4.3 4.1   CO2 33*   < >   < >  --  32* 29 27   CL 86*   < >   < >  --  92* 94* 97   BUN 7   < >   < >  --  14 12 11   CREATININE 0.9   < >   < >  --  1.4 1.4 1.2   ALKPHOS 106  --   --  89  --   --  81   ALT 11  --   --  8*  --   --  10   AST 17  --   --  13  --   --  17   BILITOT 1.2*  --   --  0.8  --   --  0.6    < > = values in this interval not displayed.       Coagulation:   Recent Labs   Lab 01/03/22  0518 01/04/22  0420 01/05/22  0507   INR 2.1* 2.1* 2.6*   APTT 38.9* 38.6* 44.4*     LDH:  Recent Labs   Lab 01/03/22  0517 01/04/22  0420 01/05/22  0507    360* 293*     Microbiology:  Microbiology Results (last 7 days)     Procedure Component Value Units Date/Time    Blood culture [[id number]] Collected: 01/02/22 1946    Order Status: Completed Specimen: Blood Updated: 01/05/22 0745     Blood Culture, Routine Gram stain uzma bottle: Gram positive cocci in clusters resembling Staph      Results called to and read back by:Khai Durand RN 01/04/2022  22:54    Culture, Anaerobe [[id number]] Collected: 01/03/22 1822    Order Status: Completed Specimen: Wound from Abdomen Updated: 01/05/22 0725     Anaerobic Culture Culture in progress    Blood culture [[id number]] Collected: 01/02/22 1946    Order Status: Completed Specimen: Blood Updated: 01/04/22 2212     Blood Culture, Routine No Growth to date      No Growth to date      No Growth to date    Blood culture [[id number]] Collected: 01/03/22 1723    Order Status: Completed Specimen: Blood Updated: 01/04/22 2012     Blood Culture, Routine No Growth to date      No Growth to date    Blood culture [[id number]] Collected: 01/03/22 1723    Order Status: Completed Specimen: Blood Updated: 01/04/22 2012     Blood Culture, Routine No Growth to date      No Growth to date    Blood culture [[id number]] Collected: 01/04/22 0425    Order Status: Completed Specimen: Blood Updated: 01/04/22 1515     Blood Culture, Routine No Growth to date    Blood culture [[id number]] Collected: 01/04/22 0420    Order Status: Completed Specimen: Blood Updated: 01/04/22 1515     Blood Culture, Routine No Growth to date    Aerobic culture [[id number]] Collected: 01/03/22 1822    Order Status: Completed Specimen: Wound from Abdomen Updated: 01/04/22 0912     Aerobic Bacterial Culture No growth    Blood culture [[id number]] Collected: 01/01/22 0020    Order  Status: Completed Specimen: Blood Updated: 01/04/22 0756     Blood Culture, Routine Gram stain aer bottle: Gram positive cocci in clusters resembling Staph       Results called to and read back by:DANIEL BLANCHARD 01/02/2022  23:29    Blood culture [[id number]] Collected: 01/01/22 0020    Order Status: Completed Specimen: Blood Updated: 01/04/22 0756     Blood Culture, Routine Gram stain aer bottle: Gram positive cocci       Results called to and read back by: DANIEL BLANCHARD RN  01/02/2022  22:36    Respiratory Infection Panel (PCR), Nasopharyngeal [[id number]] Collected: 01/03/22 1822    Order Status: Completed Specimen: Nasopharyngeal Swab Updated: 01/03/22 2107     Respiratory Infection Panel Source NP Swab     Adenovirus Not Detected     Coronavirus 229E, Common Cold Virus Not Detected     Coronavirus HKU1, Common Cold Virus Not Detected     Coronavirus NL63, Common Cold Virus Not Detected     Coronavirus OC43, Common Cold Virus Not Detected     Comment: The Coronavirus strains detected in this test cause the common cold.  These strains are not the COVID-19 (novel Coronavirus)strain   associated with the respiratory disease outbreak.          Human Metapneumovirus Not Detected     Human Rhinovirus/Enterovirus Not Detected     Influenza A (subtypes H1, H1-2009,H3) Not Detected     Influenza B Not Detected     Parainfluenza Virus 1 Not Detected     Parainfluenza Virus 2 Not Detected     Parainfluenza Virus 3 Not Detected     Parainfluenza Virus 4 Not Detected     Respiratory Syncytial Virus Not Detected     Bordetella Parapertussis ([id number]) Not Detected     Bordetella pertussis (ptxP) Not Detected     Chlamydia pneumoniae Not Detected     Mycoplasma pneumoniae Not Detected    Narrative:      For all other respiratory sources, order [id number] -  Respiratory Viral Panel by PCR          I have reviewed all pertinent labs within the past 24 hours.    Estimated Creatinine Clearance: 72 mL/min (based on SCr of 1.2  mg/dL).    Diagnostic Results:  I have reviewed all pertinent imaging results/findings within the past 24 hours.

## 2022-01-05 NOTE — PT/OT/SLP PROGRESS
"Occupational Therapy   Treatment    Name: Saba Lott  MRN: [id number]  Admitting Diagnosis:  Fever       Recommendations:     Discharge Recommendations: home health PT  Discharge Equipment Recommendations:  none  Barriers to discharge:  None    Assessment:     Saba Lott is a 56 y.o. male with a medical diagnosis of Fever.  He presents with performance deficits affecting function are weakness,impaired endurance,impaired self care skills,impaired functional mobilty,impaired cardiopulmonary response to activity. Pt would benefit from continued skilled acute OT services in order to maximize independence and safety with ADLs and functional mobility to ensure safe return to PLOF in the least restrictive environment. OT recommending HH once pt is medically appropriate for d/c.     Rehab Prognosis:  Good; patient would benefit from acute skilled OT services to address these deficits and reach maximum level of function.       Plan:     Patient to be seen 3 x/week to address the above listed problems via self-care/home management,therapeutic activities,therapeutic exercises  · Plan of Care Expires: 02/01/22  · Plan of Care Reviewed with: patient    Subjective     Pain/Comfort:  · Pain Rating 1: 0/10  · Pain Rating Post-Intervention 1: 0/10    Objective:     Communicated with: RN prior to session.  Patient found HOB elevated with telemetry,peripheral IV,LVAD upon OT entry to room. Pt agreeable to therapy session. LVAD found on wall power.   Pt stated, "Can you get my wallet, I want some chips."     General Precautions: Standard, LVAD,fall   Orthopedic Precautions:N/A   Braces: N/A  Respiratory Status: Room air     Occupational Performance:     Bed Mobility:    · Patient completed Rolling/Turning to Left with  supervision  · Patient completed Scooting/Bridging with supervision  · Patient completed Supine to Sit with supervision     Functional Mobility/Transfers:  · Patient completed Sit <> Stand Transfer with stand by " assistance  with  no assistive device   · Functional Mobility: Pt engaging in functional mobility to simulate household/community distances approx 120ft  with SBA and utilizing no AD in order to maximize functional activity tolerance and standing balance required for engagement in occupations of choice.  · Mask donned prior to exiting room   · Slow pacing, no LOB    Activities of Daily Living:  · Grooming: stand by assistance pt completed oral care and washed face while standing at sink with SBA for standing balance   · Upper Body Dressing: minimum assistance donning gown and consolidation bag strap due to line management   · Lower Body Dressing: total assistance to don B  socks - pt uses sock aid at baseline      ·  LVAD:   · Pt found with LVAD on wall power   · Pt performed transition from LVAD wall power > battery power with supervision  and min A for placement of lines within consolidation bag.   · Pt educated on proper positioning of driveline when wearing consolidation bag.  · Pt educated on importance of checking anchor daily.   · Pt left on battery power at end of therapy session     [facility] 6 Click ADL: 17    Treatment & Education:   Pt educated on role of OT, POC, and goals for therapy.     POC was dicussed with patient/caregiver, who was included in its development and is in agreement with the identified goals and treatment plan.    Patient and family aware of patient's deficits and therapy progression.    Time provided for therapeutic counseling and discussion of health disposition.    Educated on importance of EOB/OOB mobility, maintaining routine, sitting up in chair, and maximizing independence with ADLs during admission    Pt completed ADLs and functional mobility for treatment session as noted above    Pt/caregiver verbalized understanding and expressed no further concerns/questions.   Updated communication board with level of assist required (Supervision x 1 person assistance) & educated  RN/patient that pt is appropriate for transfers and functional mobility with RN/PCT.     Patient left up in chair with all lines intact, call button in reach and RN  notifiedEducation:      GOALS:   Multidisciplinary Problems     Occupational Therapy Goals        Problem: Occupational Therapy Goal    Goal Priority Disciplines Outcome Interventions   Occupational Therapy Goal     OT, PT/OT Ongoing, Progressing    Description: Goals to be met by: 2/1/22     Patient will increase functional independence with ADLs by performing:    UE Dressing with Merrill.  LE Dressing with Merrill.  Grooming while standing at sink with Merrill.  Toileting from toilet with Merrill for hygiene and clothing management.   Bathing from  shower chair/bench with Merrill.  Toilet transfer to toilet with Merrill.  Increased functional strength to WFL for B UE.  Upper extremity exercise program x15 reps per handout, with independence.                     Time Tracking:     OT Date of Treatment: 01/05/22  OT Start Time: 0840  OT Stop Time: 0906  OT Total Time (min): 26 min    Billable Minutes:Self Care/Home Management 10  Therapeutic Activity 16    OT/KUSHAL: OT          1/5/2022

## 2022-01-05 NOTE — ASSESSMENT & PLAN NOTE
- K today 4.1  - Aldactone was increased to 50 mg qd 1/1 bc of hypokalemia. With Cr above baseline at 1.4, decreased back to 25 mg qd 1/3, which was then discontinued yesterday.   - Cr improved to 1.2 today with K 4.1

## 2022-01-05 NOTE — PROGRESS NOTES
[facility] - [facility]  Cardiothoracic Surgery  Evaluation and Management/VAD interrogation       Patient Name: Saba Lott  MRN: [id number]  Admission Date: 12/31/2021  Hospital Length of Stay: 5 days  Code Status: Full Code   Attending Physician: Zulma Floyd MD   Referring Provider: Leslie, Provider  Principal Problem:Fever      Subjective:     Post-Op Info:  * No surgery found *         Subjective:     Interval History: NAEON. Resting in bed today. Cultures from 1/2/21 now positive.     Implant 2/6/2020    Medications:  Continuous Infusions:  Scheduled Meds:   albuterol-ipratropium  3 mL Nebulization Q4H    atorvastatin  20 mg Oral QHS    DAPTOmycin (CUBICIN)  IV  10 mg/kg Intravenous Q24H    docusate sodium  100 mg Oral Daily    Lactobacillus rhamnosus GG  1 capsule Oral Daily    magnesium oxide  400 mg Oral BID    meropenem (MERREM) IVPB  2 g Intravenous Q8H    mupirocin   Nasal BID     PRN Meds:acetaminophen, benzonatate, guaiFENesin 100 mg/5 ml     Objective:     Vital Signs (Most Recent):  Temp: 99.6 °F (37.6 °C) (01/05/22 1130)  Pulse: (!) 136 (01/05/22 1130)  Resp: 18 (01/05/22 1130)  BP: (!) 76/0 (01/05/22 0755)  SpO2: 100 % (01/05/22 1130) Vital Signs (24h Range):  Temp:  [98.3 °F (36.8 °C)-100.8 °F (38.2 °C)] 99.6 °F (37.6 °C)  Pulse:  [] 136  Resp:  [17-20] 18  SpO2:  [93 %-100 %] 100 %  BP: (74-76)/(0) 76/0       Intake/Output Summary (Last 24 hours) at 1/5/2022 1227  Last data filed at 1/5/2022 0800  Gross per 24 hour   Intake 1102.5 ml   Output 1325 ml   Net -222.5 ml       Physical Exam  Vitals reviewed.   Constitutional:       Appearance: He is well-developed.   HENT:      Head: Normocephalic and atraumatic.   Cardiovascular:      Rate and Rhythm: Regular rhythm. Tachycardia present.   Pulmonary:      Effort: Pulmonary effort is normal. No respiratory distress.   Skin:     Coloration: Skin is not pale.         Significant Labs:  ABGs: No results for input(s): PH,  PCO2, PO2, HCO3, POCSATURATED, BE in the last 48 hours.  Amylase: No results for input(s): AMYLASE in the last 48 hours.  BMP:   Recent Labs   Lab 01/05/22  0507      *   K 4.1   CL 97   CO2 27   BUN 11   CREATININE 1.2   CALCIUM 8.3*   MG 2.0     Cardiac markers: No results for input(s): CKMB, CPKMB, TROPONINT, TROPONINI, MYOGLOBIN in the last 48 hours.  CBC:   Recent Labs   Lab 01/05/22  0507   WBC 22.11*   RBC 4.60   HGB 8.0*   HCT 28.2*   *   MCV 61*   MCH 17.4*   MCHC 28.4*     CMP:   Recent Labs   Lab 01/05/22  0507      CALCIUM 8.3*   ALBUMIN 2.3*   PROT 6.6   *   K 4.1   CO2 27   CL 97   BUN 11   CREATININE 1.2   ALKPHOS 81   ALT 10   AST 17   BILITOT 0.6     Coagulation:   Recent Labs   Lab 01/05/22  0507   INR 2.6*   APTT 44.4*     Lactic Acid: No results for input(s): LACTATE in the last 48 hours.  LFTs:   Recent Labs   Lab 01/05/22  0507   ALT 10   AST 17   ALKPHOS 81   BILITOT 0.6   PROT 6.6   ALBUMIN 2.3*     Lipase: No results for input(s): LIPASE in the last 48 hours.    Significant Diagnostics:  I have reviewed all pertinent imaging results/findings within the past 24 hours.    Procedure: Device Interrogation Including analysis of device parameters  Current Settings: Ventricular Assist Device  Review of device function is stable  TXP LVAD INTERROGATIONS 1/5/2022 1/5/2022 1/5/2022 1/4/2022 1/4/2022 1/4/2022 1/4/2022   Type - HeartMate3 HeartMate3 HeartMate3 HeartMate3 HeartMate3 HeartMate3   Flow 4.6 4.7 4.7 4.8 4.8 4.7 4.7   Speed 5300 5300 5300 5300 5300 5300 5300   PI 3.6 2.8 3.1 3.2 2.8 3.4 3.3   Power (Centeno) 3.6 3.9 3.9 3.9 3.9 3.8 3.8   LSL 4900 4900 4900 4900 4900 4900 4900   Pulsatility - Intermittent pulse Intermittent pulse Intermittent pulse Intermittent pulse Intermittent pulse Intermittent pulse     Assessment/Plan:     LVAD (left ventricular assist device) present  - Interval History was obtained from team member  during rounding today.  -  Mobilization/Physical Therapy is ongoing.  - Anticoagulation held  - Admitted with sepsis   - WBC 22--19   - INR goal 1.5-2 but AC on hold. INR currently 2.6  -   - Creatinine 1.2  - ID following for abx management. Cultures 1/2 now positive   - HTN meds on hold  - No acute changes     More than 50 percent of the care dominated counseling and coordinating care with different team members. The VAD was interrogated and significant findings noted above.         Eunice Fagan PA-C  Cardiothoracic Surgery  [facility] - [facility]

## 2022-01-05 NOTE — PROGRESS NOTES
[facility] - [facility]  Infectious Disease  Progress Note    Patient Name: Saba Lott  MRN: [id number]  Admission Date: 12/31/2021  Length of Stay: 4 days  Attending Physician: Zulma Floyd MD  Primary Care Provider: Mary Lombardi MD    Isolation Status: No active isolations  Assessment/Plan:      * Fever     56-year-old male with history of DCM s/p LVAD 2/6/2020, left occipital IPH, lead endocarditis s/p removal, history of VISA / Pseudomonas bacteremia on chronic suppressive doxy, presents from clinic with fevers.    Blood cultures of 1/1 - 2/4 bottles + GPC resembling Staph, ID pending.   Blood cultures 1/2, 1/3, 1/4  - NGTD     CT A/P 1/2 - unchanged small region of soft tissue induration and slight skin thickening about the drive line in anterior abdominal wall. No definite focal fluid collection about the LVAD. No definite new regions of soft tissue induration or focal fluid collections are identified about the remaining subcutaneous course of the LVAD driveline  Noted patchy LLL GGO and airspace opacities with additional patchy ground glass attenuation in DEVORAH  Slight non-specific perinephric fat stranding - U/A wnl     Persistent fevers.  T max 100.8.  Upper abdominal wound cultures yesterday, NGTD.  No drainage from DLES. COVID, RIP negative.  Procal elevated. .  Patient reports feeling better today than yesterday.  Mild cough today. No SOB.   O2 Sats 93-98% on RA.        Plan/recommendations:  1.  Repeat blood cultures ordered for am.  2.  Continue IV daptomycin 10 mg/kg for now pending ID GPC in blood cultures.  Weekly CK while on dapto  3.   Continue meropenem for now pending new wound culture. Likely d/c tomorrow  4.  Will follow blood cultures, abdominal wound cultures and adjust abx accordingly.    5.  Will follow.     Data reviewed and plan discussed with ID staff, Dr. Kim        Thank you.   Please call for any questions or concerns.  DOMINGO Cabezas, ANP-C  Spectra  [phone]    Subjective:     Principal Problem:Fever    HPI: Mr. 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 Pt reports feeling weak since day prior, fevers, chills, diarrhea and vomiting x 1. Denies sore throat, HA, abdominal pain, dysuria, sob or cough. No sick contacts. No increased drainage from DLES. Lives alone.    In the ED pt febrile, tachycardic and with WBC 20. CXR with b/l edema.           Interval History:   No acute events overnight.  T max 100.8  Blood cultures 1/1 = + GPC resembling Staph.  ID pending  Blood cx 1/2, 1/3 NGTD.  1/4 pending.   COVID and RIP negative  Abdominal wound cultures pending - NGTD   Reports he feels better today. Poor appetite  Remains on IV daptomycin and meropenem      Review of Systems   Constitutional: Positive for activity change, appetite change (poor appetite) and fever. Negative for chills, diaphoresis and fatigue.   HENT: Negative for rhinorrhea and sore throat.    Eyes: Negative.    Respiratory: Positive for cough. Negative for shortness of breath.    Cardiovascular: Negative for chest pain and leg swelling.   Gastrointestinal: Negative for abdominal pain, diarrhea, nausea and vomiting.   Genitourinary: Negative for difficulty urinating, dysuria and hematuria.   Musculoskeletal: Negative for arthralgias and myalgias.   Skin: Positive for wound (open wound epigastric area). Negative for rash.   Neurological: Negative for dizziness and headaches.   Psychiatric/Behavioral: Negative for agitation. The patient is not nervous/anxious.       Objective:     Vital Signs (Most Recent):  Temp: 98.6 °F (37 °C) (01/04/22 0730)  Pulse: (!) 120 (01/04/22 1000)  Resp: 18 (01/04/22 0730)  BP: (!) 82/0 (01/04/22 0730)  SpO2: 97 % (01/04/22 0839) Vital Signs (24h Range):  Temp:  [97.8 °F (36.6 °C)-101.1 °F (38.4 °C)] 98.6 °F (37 °C)  Pulse:  [104-120] 120  Resp:  [18] 18  SpO2:  [92 %-97 %] 97 %  BP: (74-82)/(0) 82/0     Weight: 88.4 kg (194 lb 14.2 oz)  Body mass index is 30.52 kg/m².    Estimated Creatinine Clearance: 61.8 mL/min (based on SCr of 1.4 mg/dL).    Physical Exam  Vitals reviewed.   Constitutional:       General: He is not in acute distress.     Appearance: He is well-developed. He is obese. He is not ill-appearing, toxic-appearing or diaphoretic.   HENT:      Head: Normocephalic and atraumatic.   Eyes:      General: No scleral icterus.     Conjunctiva/sclera: Conjunctivae normal.   Cardiovascular:      Comments: VAD hum  Pulmonary:      Effort: Pulmonary effort is normal. No respiratory distress.      Breath sounds: No wheezing or rales.   Abdominal:      General: There is no distension.      Palpations: Abdomen is soft.      Tenderness: There is no abdominal tenderness. There is no guarding.      Comments: Epigastric wound dressed- c/d/i   DLES site dressed - c/d/i    Photos of today reviewed. See below   Musculoskeletal:         General: Normal range of motion.      Cervical back: Normal range of motion.      Right lower leg: No edema.      Left lower leg: No edema.   Skin:     General: Skin is warm and dry.      Findings: No erythema or rash.   Neurological:      Mental Status: He is alert and oriented to person, place, and time.   Psychiatric:         Behavior: Behavior normal.                 Significant Labs: All pertinent labs within the past 24 hours have been reviewed.    Significant Imaging: I have reviewed all pertinent imaging results/findings within the past 24 hours.

## 2022-01-05 NOTE — CARE UPDATE
"RAPID RESPONSE NURSE CHART REVIEW        Chart Reviewed: 01/04/2022, 8:35 PM    MRN: [id number]  Bed: 302/302 A    Dx: Fever    Saba Lott has a past medical history of Acute decompensated heart failure, Encounter for blood transfusion, Left ventricular assist device complication, LVAD (left ventricular assist device) present, and Presence of left ventricular assist device (LVAD).    Last VS: BP (!) 76/0   Pulse 108   Temp (!) 100.6 °F (38.1 °C)   Resp 17   Ht 5' 7.01" (1.702 m)   Wt 88.4 kg (194 lb 14.2 oz)   SpO2 (!) 93%   BMI 30.52 kg/m²     24H Vital Sign Range:  Temp:  [97.8 °F (36.6 °C)-100.8 °F (38.2 °C)]   Pulse:  [106-120]   Resp:  [16-18]   BP: (76-82)/(0)   SpO2:  [93 %-98 %]     Level of Consciousness (AVPU): alert    Recent Labs     01/02/22 0302 01/03/22  0518 01/04/22  0420   WBC 19.37* 19.44* 19.85*   HGB 8.0* 8.5* 9.0*   HCT 28.6* 30.2* 31.2*   * 585* 583*       Recent Labs     01/02/22 0302 01/03/22 0518 01/04/22  0420   * 134* 132*   K 3.3* 3.9 4.3   CL 92* 92* 94*   CO2 29 32* 29   CREATININE 1.3 1.4 1.4   * 96 100   MG 2.1 2.4 2.2        No results for input(s): PH, PCO2, PO2, HCO3, POCSATURATED, BE in the last 72 hours.     OXYGEN:  Flow (L/min): 2     O2 Device (Oxygen Therapy): room air    MEWS score: 4    Bedside RNKhai contacted. No concerns verbalized at this time. Instructed to call [phone] for further concerns or assistance.    Yudith Ureña RN        "

## 2022-01-05 NOTE — PROGRESS NOTES
"   01/05/22 1256        VAD 02/06/20 1047 Left ventricular assist device HeartMate 3   Placement Date/Time: 02/06/20 1047   Present Prior to Hospital Arrival?: No  Inserted by: MD  VAD Type: Left ventricular assist device  VAD Brand: HeartMate 3   Site Location Abdomen right   Site Assessment Clean;Dry;Intact   Driveline Exit Site 1   Dressing Status Clean;Dry;Intact   Dressing Intervention Sterile dressing change   Performed By RN   Dressing Change Schedule Daily   Dressing Change Due 01/06/22   Driveline [place] in use [place]   Condition CDI   Date changed 01/05/22     LVAD dressing change completed using sterile technique with kit DLES is a "1" with no drainage noted on the drain sponge. Tolerated without any complication. Patient educated on the importance of having an anchor on his drive line. Patient also educated on the importance of leaving the dressing on and to not mess with it.   "

## 2022-01-05 NOTE — PROGRESS NOTES
01/05/2022  Abelardo Sepulveda    Current provider:  Zulma Floyd MD    TXP LVAD INTERROGATIONS 1/5/2022 1/5/2022 1/5/2022 1/4/2022 1/4/2022 1/4/2022 1/4/2022   Type - HeartMate3 HeartMate3 HeartMate3 HeartMate3 HeartMate3 HeartMate3   Flow 4.6 4.7 4.7 4.8 4.8 4.7 4.7   Speed 5300 5300 5300 5300 5300 5300 5300   PI 3.6 2.8 3.1 3.2 2.8 3.4 3.3   Power (Centeno) 3.6 3.9 3.9 3.9 3.9 3.8 3.8   LSL 4900 4900 4900 4900 4900 4900 4900   Pulsatility - Intermittent pulse Intermittent pulse Intermittent pulse Intermittent pulse Intermittent pulse Intermittent pulse          Rounded on Saba A Oren to ensure all mechanical assist device settings (IABP or VAD) were appropriate and all parameters were within limits.  I was able to ensure all back up equipment was present, the staff had no issues, and the Perfusion Department daily rounding was complete.    In emergency, the nursing units have been notified to contact the perfusion department either by:  Calling [phone] from 630am to 4pm Mon thru Fri, or by contacting the hospital  from 4pm to 630am and on weekends and asking to speak with the perfusionist on call.    8:19 AM

## 2022-01-05 NOTE — PROGRESS NOTES
[facility] - [facility]  Wound Care    Patient Name:  Saba Lott   MRN:  [id number]  Date: 1/5/2022  Diagnosis: Fever    History:     Past Medical History:   Diagnosis Date    Acute decompensated heart failure     Encounter for blood transfusion     Left ventricular assist device complication 8/5/2021    LVAD (left ventricular assist device) present 2020    Presence of left ventricular assist device (LVAD)        Social History     Socioeconomic History    Marital status:    Tobacco Use    Smoking status: Former Smoker     Packs/day: 0.25     Years: 1.00     Pack years: 0.25    Smokeless tobacco: Never Used   Substance and Sexual Activity    Alcohol use: No     Comment: quit drinking this year    Drug use: Not Currently     Types: Oxycodone    Sexual activity: Not Currently       Precautions:     Allergies as of 12/31/2021 - Reviewed 12/31/2021   Allergen Reaction Noted    Iodine and iodide containing products  02/12/2019       [facility] Assessment Details/Treatment   Wound care consulted for right medial abdomen by RN  Mr Lott is lying in bed with LVAD dressing loose.  Secured dressing in place, nurse notifed.  The mid upper abdomen (below the sternum) has a small ulcerated skin area with a small amount of serous/serosanguineous drainage on the old dressing.   The scar-wound skin is shiny/intact with slight induration.  Discussed wound care with Mr. Lott, he verbalized understanding.    Plan:  Upper abdomen- cleanse skin with sterile normal saline, place Aquacel Ag hydrofiber rope over the ulcer then cover with mepore dressing every M-W-F.     Nursing to continue care, pressure prevention measures  Wound care will follow-up prn as needed.   Recommendations made to primary team per secure chat for above plan . Orders placed.      01/05/22 1000        Wound 01/01/22 0400 Ulceration Right medial Upper quadrant   Date First Assessed/Time First Assessed: 01/01/22 0400   Pre-existing: Yes  Primary Wound  Type: Ulceration  Side: Right  Orientation: medial  Location: Upper quadrant   Wound Image    Wound WDL ex   Dressing Appearance Intact;Moist drainage   Drainage Amount Small   Drainage Characteristics/Odor Serosanguineous;Serous   Appearance Pink;Red;Moist   Tissue loss description Partial thickness   Wound Length (cm) 0.2 cm   Wound Width (cm) 0.2 cm   Wound Surface Area (cm^2) 0.04 cm^2   Care Cleansed with:;Sterile normal saline   Dressing Applied;Hydrofiber;Silver;Island/border   Dressing Change Due 01/07/22 01/05/2022

## 2022-01-05 NOTE — SUBJECTIVE & OBJECTIVE
Interval History:   No acute events overnight.  T max 100.8. Persistent low grade fever during day   WBC 19>22  Blood cultures 1/1 - 2 of 4 bottles now showing Staph Aureus  Blood cx 1/2 - 2 of 4 bottles with GPC  Blood cx 1/3 and 1/4 NGTD.  Abdominal wound cultures pending -showing GNR    Cough worsened.  COVID and RIP were negative  CXR today without change.   Reports cough improved this afternoon with cough suppressant.  Denies rigors, sweats.  Reports he still feels better than on admission.     Review of Systems   Constitutional: Positive for activity change, appetite change (poor appetite) and fever. Negative for chills, diaphoresis and fatigue.   HENT: Negative for congestion, rhinorrhea, sore throat and trouble swallowing.    Eyes: Negative.    Respiratory: Positive for cough. Negative for shortness of breath.    Cardiovascular: Negative for chest pain and leg swelling.   Gastrointestinal: Negative for abdominal pain, diarrhea, nausea and vomiting.   Genitourinary: Negative for difficulty urinating, dysuria, flank pain and hematuria.   Musculoskeletal: Negative for arthralgias, back pain, joint swelling, myalgias and neck pain.   Skin: Positive for wound (open wound epigastric area). Negative for rash.   Neurological: Negative for dizziness, weakness and headaches.   Psychiatric/Behavioral: Negative for agitation. The patient is not nervous/anxious.      Objective:     Vital Signs (Most Recent):  Temp: 98.3 °F (36.8 °C) (01/05/22 0723)  Pulse: 78 (01/05/22 0831)  Resp: 18 (01/05/22 0831)  BP: (!) 76/0 (01/05/22 0755)  SpO2: 97 % (01/05/22 0831) Vital Signs (24h Range):  Temp:  [98.3 °F (36.8 °C)-100.8 °F (38.2 °C)] 98.3 °F (36.8 °C)  Pulse:  [] 78  Resp:  [16-20] 18  SpO2:  [93 %-98 %] 97 %  BP: (74-76)/(0) 76/0     Weight: 89.5 kg (197 lb 5 oz)  Body mass index is 30.9 kg/m².    Estimated Creatinine Clearance: 72.5 mL/min (based on SCr of 1.2 mg/dL).    Physical Exam  Vitals and nursing note reviewed.    Constitutional:       General: He is not in acute distress.     Appearance: He is well-developed. He is obese. He is not ill-appearing, toxic-appearing or diaphoretic.   HENT:      Head: Normocephalic and atraumatic.   Eyes:      General: No scleral icterus.     Conjunctiva/sclera: Conjunctivae normal.   Cardiovascular:      Rate and Rhythm: Normal rate.      Comments: VAD hum  Pulmonary:      Effort: Pulmonary effort is normal. No respiratory distress.      Breath sounds: No wheezing or rales.   Abdominal:      General: There is no distension.      Palpations: Abdomen is soft.      Tenderness: There is no abdominal tenderness. There is no guarding.      Comments: Epigastric wound dressed- c/d/i.  No surrounding tenderness or erythema   DLES site dressed - c/d/i.  No tenderness along drivelin    Photos of 1/4 reviewed. See below   Musculoskeletal:         General: No swelling or tenderness. Normal range of motion.      Cervical back: Normal range of motion.      Right lower leg: No edema.      Left lower leg: No edema.   Skin:     General: Skin is warm and dry.      Findings: No erythema or rash.   Neurological:      Mental Status: He is alert and oriented to person, place, and time.   Psychiatric:         Behavior: Behavior normal.                 Significant Labs: All pertinent labs within the past 24 hours have been reviewed.    Significant Imaging: I have reviewed all pertinent imaging results/findings within the past 24 hours.

## 2022-01-05 NOTE — ASSESSMENT & PLAN NOTE
Symptoms on admit: cough, chills, sputum production, nausea, vomiting x 1, weakness  -  Temp 103 12/31. 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 Site with no drainage  - Blood cxs from 1/1 with with GPC. Repeat blood cultures NGTD. Wound cultures NGTD  - Appreciate ID's help. - switched vanc to dapto 1/1 given hx of VISA. Cefepime switched to Meropenem 1/2  - CT c/a/p on admit with left basilar ground-glass and airspace opacities with additional slight ground-glass attenuation in the left upper lobe.    - Significant cough this morning, given DuoNebs and antitussives.

## 2022-01-05 NOTE — SUBJECTIVE & OBJECTIVE
Subjective:     Interval History: NAEON. Resting in bed today. Cultures from 1/2/21 now positive.     Implant 2/6/2020    Medications:  Continuous Infusions:  Scheduled Meds:   albuterol-ipratropium  3 mL Nebulization Q4H    atorvastatin  20 mg Oral QHS    DAPTOmycin (CUBICIN)  IV  10 mg/kg Intravenous Q24H    docusate sodium  100 mg Oral Daily    Lactobacillus rhamnosus GG  1 capsule Oral Daily    magnesium oxide  400 mg Oral BID    meropenem (MERREM) IVPB  2 g Intravenous Q8H    mupirocin   Nasal BID     PRN Meds:acetaminophen, benzonatate, guaiFENesin 100 mg/5 ml     Objective:     Vital Signs (Most Recent):  Temp: 99.6 °F (37.6 °C) (01/05/22 1130)  Pulse: (!) 136 (01/05/22 1130)  Resp: 18 (01/05/22 1130)  BP: (!) 76/0 (01/05/22 0755)  SpO2: 100 % (01/05/22 1130) Vital Signs (24h Range):  Temp:  [98.3 °F (36.8 °C)-100.8 °F (38.2 °C)] 99.6 °F (37.6 °C)  Pulse:  [] 136  Resp:  [17-20] 18  SpO2:  [93 %-100 %] 100 %  BP: (74-76)/(0) 76/0       Intake/Output Summary (Last 24 hours) at 1/5/2022 1227  Last data filed at 1/5/2022 0800  Gross per 24 hour   Intake 1102.5 ml   Output 1325 ml   Net -222.5 ml       Physical Exam  Vitals reviewed.   Constitutional:       Appearance: He is well-developed.   HENT:      Head: Normocephalic and atraumatic.   Cardiovascular:      Rate and Rhythm: Regular rhythm. Tachycardia present.   Pulmonary:      Effort: Pulmonary effort is normal. No respiratory distress.   Skin:     Coloration: Skin is not pale.         Significant Labs:  ABGs: No results for input(s): PH, PCO2, PO2, HCO3, POCSATURATED, BE in the last 48 hours.  Amylase: No results for input(s): AMYLASE in the last 48 hours.  BMP:   Recent Labs   Lab 01/05/22  0507      *   K 4.1   CL 97   CO2 27   BUN 11   CREATININE 1.2   CALCIUM 8.3*   MG 2.0     Cardiac markers: No results for input(s): CKMB, CPKMB, TROPONINT, TROPONINI, MYOGLOBIN in the last 48 hours.  CBC:   Recent Labs   Lab 01/05/22  0507    WBC 22.11*   RBC 4.60   HGB 8.0*   HCT 28.2*   *   MCV 61*   MCH 17.4*   MCHC 28.4*     CMP:   Recent Labs   Lab 01/05/22  0507      CALCIUM 8.3*   ALBUMIN 2.3*   PROT 6.6   *   K 4.1   CO2 27   CL 97   BUN 11   CREATININE 1.2   ALKPHOS 81   ALT 10   AST 17   BILITOT 0.6     Coagulation:   Recent Labs   Lab 01/05/22  0507   INR 2.6*   APTT 44.4*     Lactic Acid: No results for input(s): LACTATE in the last 48 hours.  LFTs:   Recent Labs   Lab 01/05/22  0507   ALT 10   AST 17   ALKPHOS 81   BILITOT 0.6   PROT 6.6   ALBUMIN 2.3*     Lipase: No results for input(s): LIPASE in the last 48 hours.    Significant Diagnostics:  I have reviewed all pertinent imaging results/findings within the past 24 hours.    Procedure: Device Interrogation Including analysis of device parameters  Current Settings: Ventricular Assist Device  Review of device function is stable  TXP LVAD INTERROGATIONS 1/5/2022 1/5/2022 1/5/2022 1/4/2022 1/4/2022 1/4/2022 1/4/2022   Type - HeartMate3 HeartMate3 HeartMate3 HeartMate3 HeartMate3 HeartMate3   Flow 4.6 4.7 4.7 4.8 4.8 4.7 4.7   Speed 5300 5300 5300 5300 5300 5300 5300   PI 3.6 2.8 3.1 3.2 2.8 3.4 3.3   Power (Centeno) 3.6 3.9 3.9 3.9 3.9 3.8 3.8   LSL 4900 4900 4900 4900 4900 4900 4900   Pulsatility - Intermittent pulse Intermittent pulse Intermittent pulse Intermittent pulse Intermittent pulse Intermittent pulse

## 2022-01-05 NOTE — PT/OT/SLP PROGRESS
Physical Therapy      Patient Name:  Saba Lott   MRN:  [id number]    Patient not seen today secondary to working with OT in am and sleeping in pm.  Barbara Joaquin PT 1/5/22

## 2022-01-05 NOTE — ASSESSMENT & PLAN NOTE
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 INR 2.6. Coumadin originally held on admit, resumed 1/3. PharmD to manage Coumadin  -LDH stable  -ECHO 10/9/21 with EF 20%, BRYAN, IVS bows into RV, G1DD, s/p Alferi stitch, mild-moderate RVSF, CVP 3, LVEDD 6.43 with LVAD speed set to 5300 rpm        Procedure: Device Interrogation Including analysis of device parameters  Current Settings: Ventricular Assist Device  Review of device function is stable      TXP LVAD INTERROGATIONS 1/5/2022 1/5/2022 1/5/2022 1/4/2022 1/4/2022 1/4/2022 1/4/2022   Type - HeartMate3 HeartMate3 HeartMate3 HeartMate3 HeartMate3 HeartMate3   Flow 4.6 4.7 4.7 4.8 4.8 4.7 4.7   Speed 5300 5300 5300 5300 5300 5300 5300   PI 3.6 2.8 3.1 3.2 2.8 3.4 3.3   Power (Centeno) 3.6 3.9 3.9 3.9 3.9 3.8 3.8   LSL 4900 4900 4900 4900 4900 4900 4900   Pulsatility - Intermittent pulse Intermittent pulse Intermittent pulse Intermittent pulse Intermittent pulse Intermittent pulse

## 2022-01-05 NOTE — ASSESSMENT & PLAN NOTE
- Interval History was obtained from team member  during rounding today.  - Mobilization/Physical Therapy is ongoing.  - Anticoagulation held  - Admitted with sepsis   - WBC 22--19   - INR goal 1.5-2 but AC on hold. INR currently 2.6  -   - Creatinine 1.2  - ID following for abx management. Cultures 1/2 now positive   - HTN meds on hold  - No acute changes     More than 50 percent of the care dominated counseling and coordinating care with different team members. The VAD was interrogated and significant findings noted above.

## 2022-01-05 NOTE — ASSESSMENT & PLAN NOTE
56-year-old male with history of DCM s/p LVAD 2/6/2020, left occipital IPH, lead endocarditis s/p removal, history of VISA / Pseudomonas bacteremia on chronic suppressive doxy, presents from clinic with fevers.    Blood cultures of 1/1 - 2/4 bottles + GPC resembling Staph, ID pending.   Blood cultures 1/2, 1/3, 1/4  - NGTD     CT A/P 1/2 - unchanged small region of soft tissue induration and slight skin thickening about the drive line in anterior abdominal wall. No definite focal fluid collection about the LVAD. No definite new regions of soft tissue induration or focal fluid collections are identified about the remaining subcutaneous course of the LVAD driveline  Noted patchy LLL GGO and airspace opacities with additional patchy ground glass attenuation in DEVORAH  Slight non-specific perinephric fat stranding - U/A wnl     Persistent fevers.  T max 100.8.  Upper abdominal wound cultures yesterday, NGTD.  No drainage from DLES. COVID, RIP negative.  Procal elevated. .  Patient reports feeling better today than yesterday.  Mild cough today. No SOB.   O2 Sats 93-98% on RA.        Plan/recommendations:  1.  Repeat blood cultures ordered for am.  2.  Continue IV daptomycin 10 mg/kg for now pending ID GPC in blood cultures.  Weekly CK while on dapto  3.   Continue meropenem for now pending new wound culture. Likely d/c tomorrow  4.  Will follow blood cultures, abdominal wound cultures and adjust abx accordingly.    5.  Will follow.     Data reviewed and plan discussed with ID staff, Dr. Kim

## 2022-01-05 NOTE — PROGRESS NOTES
[facility] - Cardiology Stepdown  Heart Transplant  Progress Note    Patient Name: Saba Lott  MRN: [id number]  Admission Date: 12/31/2021  Hospital Length of Stay: 5 days  Attending Physician: Zulma Floyd MD  Primary Care Provider: Mary Lombardi MD  Principal Problem:Fever    Subjective:     Interval History: TMax 100.8 F over the past 24 hours. ID following, recommend continuing IV dapto and meropenem. Significant cough this morning, will add DuoNeb treatments and antitussives.     Continuous Infusions:  Scheduled Meds:   albuterol-ipratropium  3 mL Nebulization Q4H    atorvastatin  20 mg Oral QHS    DAPTOmycin (CUBICIN)  IV  10 mg/kg Intravenous Q24H    docusate sodium  100 mg Oral Daily    Lactobacillus rhamnosus GG  1 capsule Oral Daily    magnesium oxide  400 mg Oral BID    meropenem (MERREM) IVPB  2 g Intravenous Q8H    mupirocin   Nasal BID    warfarin  7.5 mg Oral Daily     PRN Meds:acetaminophen, benzonatate, guaiFENesin 100 mg/5 ml    Review of patient's allergies indicates:   Allergen Reactions    Iodine and iodide containing products      Objective:     Vital Signs (Most Recent):  Temp: 98.3 °F (36.8 °C) (01/05/22 0723)  Pulse: 109 (01/05/22 0724)  Resp: 20 (01/05/22 0723)  BP: (!) 76/0 (01/05/22 0755)  SpO2: 97 % (01/05/22 0723) Vital Signs (24h Range):  Temp:  [98.3 °F (36.8 °C)-100.8 °F (38.2 °C)] 98.3 °F (36.8 °C)  Pulse:  [] 109  Resp:  [16-20] 20  SpO2:  [93 %-98 %] 97 %  BP: (74-76)/(0) 76/0     Patient Vitals for the past 72 hrs (Last 3 readings):   Weight   01/04/22 0800 88.4 kg (194 lb 14.2 oz)   01/03/22 0800 89.6 kg (197 lb 8.5 oz)   01/02/22 1700 89.5 kg (197 lb 5 oz)     Body mass index is 30.52 kg/m².      Intake/Output Summary (Last 24 hours) at 1/5/2022 0819  Last data filed at 1/5/2022 0754  Gross per 24 hour   Intake 862.5 ml   Output 1025 ml   Net -162.5 ml       Hemodynamic Parameters:       Telemetry: ST    Physical Exam  Constitutional:       Appearance:  Normal appearance.   HENT:      Head: Normocephalic and atraumatic.   Eyes:      Conjunctiva/sclera: Conjunctivae normal.   Neck:      Comments: Neck veins are not elevated  Cardiovascular:      Rate and Rhythm: Regular rhythm. Tachycardia present.      Comments: Smooth VAD hum  Pulmonary:      Effort: Pulmonary effort is normal.      Breath sounds: Normal breath sounds.   Abdominal:      General: Bowel sounds are normal.      Palpations: Abdomen is soft. Small round wound 1 cm in diameter to right abdomen just off midline (not DLES) with scant amount of serous fluid drainage on dressing.     Musculoskeletal:         General: No swelling. Normal range of motion.      Cervical back: Normal range of motion and neck supple.   Skin:     General: Skin is warm and dry.      Capillary Refill: Capillary refill takes 2 to 3 seconds.   Neurological:      General: No focal deficit present.      Mental Status: He is alert and oriented to person, place, and time.   Psychiatric:         Mood and Affect: Mood normal.         Behavior: Behavior normal.         Thought Content: Thought content normal.         Judgment: Judgment normal.         Significant Labs:  CBC:  Recent Labs   Lab 01/03/22 0518 01/04/22  0420 01/05/22  0507   WBC 19.44* 19.85* 22.11*   RBC 4.90 5.16 4.60   HGB 8.5* 9.0* 8.0*   HCT 30.2* 31.2* 28.2*   * 583* 545*   MCV 62* 61* 61*   MCH 17.3* 17.4* 17.4*   MCHC 28.1* 28.8* 28.4*     BNP:  Recent Labs   Lab 01/01/22  0020 01/03/22  0517 01/05/22  0507   * 400* 381*     CMP:  Recent Labs   Lab 01/01/22  0020 01/01/22  1132 01/02/22  0302 01/03/22  0517 01/03/22  0518 01/04/22  0420 01/05/22  0507      < >   < >  --  96 100 101   CALCIUM 8.5*   < >   < >  --  8.6* 8.6* 8.3*   ALBUMIN 2.6*  --   --  2.4*  --   --  2.3*   PROT 7.9  --   --  6.7  --   --  6.6   *   < >   < >  --  134* 132* 131*   K 2.9*   < >   < >  --  3.9 4.3 4.1   CO2 33*   < >   < >  --  32* 29 27   CL 86*   < >   < >   --  92* 94* 97   BUN 7   < >   < >  --  14 12 11   CREATININE 0.9   < >   < >  --  1.4 1.4 1.2   ALKPHOS 106  --   --  89  --   --  81   ALT 11  --   --  8*  --   --  10   AST 17  --   --  13  --   --  17   BILITOT 1.2*  --   --  0.8  --   --  0.6    < > = values in this interval not displayed.      Coagulation:   Recent Labs   Lab 01/03/22 0518 01/04/22 0420 01/05/22  0507   INR 2.1* 2.1* 2.6*   APTT 38.9* 38.6* 44.4*     LDH:  Recent Labs   Lab 01/03/22 0517 01/04/22 0420 01/05/22  0507    360* 293*     Microbiology:  Microbiology Results (last 7 days)     Procedure Component Value Units Date/Time    Blood culture [[id number]] Collected: 01/02/22 1946    Order Status: Completed Specimen: Blood Updated: 01/05/22 0745     Blood Culture, Routine Gram stain uzma bottle: Gram positive cocci in clusters resembling Staph      Results called to and read back by:Khai Durand RN 01/04/2022  22:54    Culture, Anaerobe [[id number]] Collected: 01/03/22 1822    Order Status: Completed Specimen: Wound from Abdomen Updated: 01/05/22 0725     Anaerobic Culture Culture in progress    Blood culture [[id number]] Collected: 01/02/22 1946    Order Status: Completed Specimen: Blood Updated: 01/04/22 2212     Blood Culture, Routine No Growth to date      No Growth to date      No Growth to date    Blood culture [[id number]] Collected: 01/03/22 1723    Order Status: Completed Specimen: Blood Updated: 01/04/22 2012     Blood Culture, Routine No Growth to date      No Growth to date    Blood culture [[id number]] Collected: 01/03/22 1723    Order Status: Completed Specimen: Blood Updated: 01/04/22 2012     Blood Culture, Routine No Growth to date      No Growth to date    Blood culture [[id number]] Collected: 01/04/22 0425    Order Status: Completed Specimen: Blood Updated: 01/04/22 1515     Blood Culture, Routine No Growth to date    Blood culture [[id number]] Collected: 01/04/22 0420    Order Status: Completed Specimen: Blood Updated:  01/04/22 1515     Blood Culture, Routine No Growth to date    Aerobic culture [[id number]] Collected: 01/03/22 1822    Order Status: Completed Specimen: Wound from Abdomen Updated: 01/04/22 0912     Aerobic Bacterial Culture No growth    Blood culture [[id number]] Collected: 01/01/22 0020    Order Status: Completed Specimen: Blood Updated: 01/04/22 0756     Blood Culture, Routine Gram stain aer bottle: Gram positive cocci in clusters resembling Staph       Results called to and read back by:DANIEL BLANCHARD 01/02/2022  23:29    Blood culture [[id number]] Collected: 01/01/22 0020    Order Status: Completed Specimen: Blood Updated: 01/04/22 0756     Blood Culture, Routine Gram stain aer bottle: Gram positive cocci       Results called to and read back by: DANIEL BLANCHARD RN  01/02/2022  22:36    Respiratory Infection Panel (PCR), Nasopharyngeal [[id number]] Collected: 01/03/22 1822    Order Status: Completed Specimen: Nasopharyngeal Swab Updated: 01/03/22 2107     Respiratory Infection Panel Source NP Swab     Adenovirus Not Detected     Coronavirus 229E, Common Cold Virus Not Detected     Coronavirus HKU1, Common Cold Virus Not Detected     Coronavirus NL63, Common Cold Virus Not Detected     Coronavirus OC43, Common Cold Virus Not Detected     Comment: The Coronavirus strains detected in this test cause the common cold.  These strains are not the COVID-19 (novel Coronavirus)strain   associated with the respiratory disease outbreak.          Human Metapneumovirus Not Detected     Human Rhinovirus/Enterovirus Not Detected     Influenza A (subtypes H1, H1-2009,H3) Not Detected     Influenza B Not Detected     Parainfluenza Virus 1 Not Detected     Parainfluenza Virus 2 Not Detected     Parainfluenza Virus 3 Not Detected     Parainfluenza Virus 4 Not Detected     Respiratory Syncytial Virus Not Detected     Bordetella Parapertussis (CM6697) Not Detected     Bordetella pertussis (ptxP) Not Detected     Chlamydia pneumoniae Not  Detected     Mycoplasma pneumoniae Not Detected    Narrative:      For all other respiratory sources, order [id number] -  Respiratory Viral Panel by PCR          I have reviewed all pertinent labs within the past 24 hours.    Estimated Creatinine Clearance: 72 mL/min (based on SCr of 1.2 mg/dL).    Diagnostic Results:  I have reviewed all pertinent imaging results/findings within the past 24 hours.    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 Patient is short of breath and uncomfortable at the time of giving history. He reports chills, weakness, cough, sputum production, weight gain, nausea, vomiting and trouble breathing. He went to his PCP office yesterday where his temp was 103 and was sent to the hospital afterwards. His white count is 20 on arrival. He is unable to lay flat. His HR is 102-142 sinus. He needs to be diuresed upon arrival but K is 2.9, ordered IV and PO Kcl, he vomited out oral potassium, IV replacement will be done.       * Fever  Symptoms on admit: cough, chills, sputum production, nausea, vomiting x 1, weakness  -  Temp 103 12/31. 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 Site with no drainage  - Blood cxs from 1/1 with with GPC. Repeat blood cultures NGTD. Wound cultures NGTD  - Appreciate ID's help. - switched vanc to dapto 1/1 given hx of VISA. Cefepime switched to Meropenem 1/2  - CT c/a/p on admit with left basilar ground-glass and airspace opacities with  additional slight ground-glass attenuation in the left upper lobe.    - Significant cough this morning, given DuoNebs and antitussives.             Sepsis  -See fever    Hypokalemia  - K today 4.1  - Aldactone was increased to 50 mg qd 1/1 bc of hypokalemia. With Cr above baseline at 1.4, decreased back to 25 mg qd 1/3, which was then discontinued yesterday.   - Cr improved to 1.2 today with K 4.1    LVAD (left ventricular assist device) present  -S/P DT HM3 with MVR 2/2/20  -Current speed 5300  -INR goal 1.5-2.0 (hx of ICH). INR 2.6. Coumadin originally held on admit, resumed 1/3. PharmD to manage Coumadin  -LDH stable  -ECHO 10/9/21 with EF 20%, BRYAN, IVS bows into RV, G1DD, s/p Alferi stitch, mild-moderate RVSF, CVP 3, LVEDD 6.43 with LVAD speed set to 5300 rpm        Procedure: Device Interrogation Including analysis of device parameters  Current Settings: Ventricular Assist Device  Review of device function is stable      TXP LVAD INTERROGATIONS 1/5/2022 1/5/2022 1/5/2022 1/4/2022 1/4/2022 1/4/2022 1/4/2022   Type - HeartMate3 HeartMate3 HeartMate3 HeartMate3 HeartMate3 HeartMate3   Flow 4.6 4.7 4.7 4.8 4.8 4.7 4.7   Speed 5300 5300 5300 5300 5300 5300 5300   PI 3.6 2.8 3.1 3.2 2.8 3.4 3.3   Power (Centeno) 3.6 3.9 3.9 3.9 3.9 3.8 3.8   LSL 4900 4900 4900 4900 4900 4900 4900   Pulsatility - Intermittent pulse Intermittent pulse Intermittent pulse Intermittent pulse Intermittent pulse Intermittent pulse       Melissa Olson PA-C  Heart Transplant  [facility] - [facility]iology Stepdown

## 2022-01-05 NOTE — PHYSICIAN QUERY
PT Name: Saba Lott  MR #: [id number]     DOCUMENTATION CLARIFICATION     CDS/: FRIEDA GomezN, RN, CCDS               Contact information: [email or web address]  This form is a permanent document in the medical record.     Query Date: January 5, 2022    By submitting this query, we are merely seeking further clarification of documentation.  Please utilize your independent clinical judgment when addressing the question(s) below.    The Medical Record contains the following   Indicators Supporting Clinical Findings Location in Medical Record   X Heart Failure documented presenting to the hospital from an OSH for evaluation/treatment of sepsis and ADHF    Acute decompensated heart failure  BNP ~600, baseline 20 four months ago  Reports 12 lbs weight gain  Can't see JVD with obesity  Having active symptoms of congestion   Transplant Heart PN: CONG Olson 1/5    H & P: Dr. Tafoya 1/1   X    Lab: 1/1   X EF/Echo ECHO 10/9/21 with EF 20%, BRYAN, IVS bows into RV, G1DD, s/p Alferi stitch, mild-moderate RVSF, CVP 3, LVEDD 6.43 with LVAD speed set to 5300 rpm    Transplant Heart PN: CONG Olson 1/4   X Radiology findings CXR: Cardiomegaly with mild bilateral edema   CXR: 1/1   X Subjective/Objective Respiratory Conditions Patient is short of breath and uncomfortable at the time of giving history. He reports chills, weakness, cough, sputum production, weight gain, nausea, vomiting and trouble breathing  He is unable to lay flat.    H & P: Dr. Tafoya 1/1    Recent/Current MI     X Heart Transplant, LVAD LVAD (left ventricular assist device) present  -S/P DT HM3 with MVR 2/2/20  -Current speed 5300   Transplant heart PN: Dr. Tafoya 1/2   X Edema, JVD Right lower leg: Edema (non pitting) present   ID consult: Dr. Eldridge 1/1    Ascites     X Diuretics/Meds start diuresis with IV lasix   H & P: Dr. Tafoya 1/1    Other Treatment      Other       Heart failure is a clinical diagnosis which includes symptomatic fluid retention,  elevated intracardiac pressures, and/or the inability of the heart to deliver adequate blood flow.    Heart Failure with reduced Ejection Fraction (HFrEF) or Systolic Heart Failure (loses ability to contract normally, EF is <40%)    Heart Failure with preserved Ejection Fraction (HFpEF) or Diastolic Heart Failure (stiff ventricles, does not relax properly, EF is >50%)     Heart Failure with Combined Systolic and Diastolic Failure (stiff ventricles, does not relax properly and EF is <50%)    Mid-range or mildly reduced ejection fraction (HFmrEF) is classified as systolic heart failure.   Common clues to acute exacerbation:  Rapidly progressive symptoms (w/in 2 weeks of presentation), using IV diuretics, using supplemental O2, pulmonary edema on Xray, new or worsening pleural effusion, +JVD or other signs of volume overload, MI w/in 4 weeks, and/or BNP >500  The clinical guidelines noted are only system guidelines, and do not replace the providers clinical judgment.    Provider, please specify the CHF diagnosis associated with the above clinical findings.    [   ]  Acute Systolic Heart Failure (HFrEF or HFmrEF) - new diagnosis   [  x ]  Acute on Chronic Systolic Heart Failure (HFrEF or HFmrEF) - worsening of CHF signs/symptoms in preexisting CHF   [   ]  Chronic Systolic Heart Failure (HFrEF or HFmrEF) - preexisting and stable   [   ]  Other (please specify): ___________________________________   [  ]  Clinically Undetermined     Please document in your progress notes daily for the duration of treatment until resolved and include in your discharge summary.    References:  American Heart Association editorial staff. (2017, May). Ejection Fraction Heart Failure Measurement. American Heart Association. https://www.heart.org/en/health-topics/heart-failure/diagnosing-heart-failure/ejection-fraction-heart-failure-measurement#:~:text=Ejection%20fraction%20(EF)%20is%20a,pushed%20out%20with%20each%20heartbeat  FLORA Montez.  (2020, December 15). Heart failure with preserved ejection fraction: Clinical manifestations and diagnosis. [employer]. https://www.Shook.com/contents/heart-failure-with-preserved-ejection-fraction-clinical-manifestations-and-diagnosis.  ICD-10-CM/PCS Coding Clinic Third Quarter ICD-10, Effective with discharges: September 8, 2020 [facility] § Heart failure with mid-range or mildly reduced ejection fraction (2020).  Form No. 16975

## 2022-01-06 NOTE — ASSESSMENT & PLAN NOTE
56-year-old male with history of DCM s/p LVAD 2/6/2020, left occipital IPH, lead endocarditis s/p removal, history of VISA and polymicrobial  bacteremia in October (pseudomonas, e coli, proteus.  Strongy negative) on chronic suppressive doxy, presents from clinic with fevers.    Blood cultures of 1/1 - 2/4 bottles + Staph Aureus - susceptibility pending  Blood cultures 1/2 - 2/4 positive GPC  Blood cultures  1/3, 1/4  - NGTD     CT A/P 1/2/22 - unchanged small region of soft tissue induration and slight skin thickening about the drive line in anterior abdominal wall. No definite focal fluid collection about the LVAD. No definite new regions of soft tissue induration or focal fluid collections are identified about the remaining subcutaneous course of the LVAD driveline  Noted patchy LLL GGO and airspace opacities with additional patchy ground glass attenuation in DEVORAH  Slight non-specific perinephric fat stranding - U/A wnl     Still with persistent fever but denies rigors/sweats.   + cough.  Repeat CXR today unchanged.  No other complaints.   Upper abdominal wound cultures now with GNR, ID pending.  No drainage from DLES.  Patient continues to report he feels better than admission    Plan/recommendations:  1.   Continue IV daptomycin 10 mg/kg for now pending S. Aureus susceptibilities. Weekly CK while on dapto  2.   Continue meropenem for now pending new wound culture.   3.   Will follow blood cultures, abdominal wound cultures and adjust abx accordingly.      Data reviewed and plan discussed with ID staff, Dr. Kim  Secure chat with Primary Team, CONG Ruiz

## 2022-01-06 NOTE — NURSING
"LVAD dressing change completed using sterile technique with kit. DLES is a "1" with no drainage noted on the drain sponge. Tolerated without any complication. No redness, or tenderness noted.     "

## 2022-01-06 NOTE — PLAN OF CARE
Chest xray completed. LVAD dressing change completed. 2 new PIVs placed. Patient ST on tele due to scheduled duonebs. Plan of care discussed with patient.  Patient ambulating with standby assistance, fall precautions in place. LVAD DP and numbers WNL, smooth LVAD hum. Patient has no complaints of pain. Discussed medications and care. Patient has no questions at this time. Will continue to monitor.

## 2022-01-06 NOTE — PROGRESS NOTES
01/06/2022  Abelardo Sepulveda    Current provider:  Zulma Floyd MD    TXP LVAD INTERROGATIONS 1/6/2022 1/6/2022 1/6/2022 1/5/2022 1/5/2022 1/5/2022 1/5/2022   Type HeartMate3 HeartMate3 HeartMate3 HeartMate3 - - HeartMate3   Flow 4.8 4.7 4.9 4.7 4.8 4.6 4.7   Speed 5300 5300 5300 5300 5300 5300 5300   PI 3.1 3.1 3.1 3.2 3.1 3.6 2.8   Power (Centeno) 3.8 3.8 3.8 3.9 3.8 3.6 3.9   LSL 4900 4900 4900 4900 - 4900 4900   Pulsatility Intermittent pulse Intermittent pulse Intermittent pulse Intermittent pulse - - Intermittent pulse          Rounded on Saba A Oren to ensure all mechanical assist device settings (IABP or VAD) were appropriate and all parameters were within limits.  I was able to ensure all back up equipment was present, the staff had no issues, and the Perfusion Department daily rounding was complete.    In emergency, the nursing units have been notified to contact the perfusion department either by:  Calling [phone] from 630am to 4pm Mon thru Fri, or by contacting the hospital  from 4pm to 630am and on weekends and asking to speak with the perfusionist on call.    7:49 AM

## 2022-01-06 NOTE — ASSESSMENT & PLAN NOTE
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 INR 3.0, Coumadin is on hold  -LDH stable  -ECHO 10/9/21 with EF 20%, BRYAN, IVS bows into RV, G1DD, s/p Alferi stitch, mild-moderate RVSF, CVP 3, LVEDD 6.43 with LVAD speed set to 5300 rpm        Procedure: Device Interrogation Including analysis of device parameters  Current Settings: Ventricular Assist Device  Review of device function is stable      TXP LVAD INTERROGATIONS 1/6/2022 1/6/2022 1/6/2022 1/5/2022 1/5/2022 1/5/2022 1/5/2022   Type HeartMate3 HeartMate3 HeartMate3 HeartMate3 - - HeartMate3   Flow 4.8 4.7 4.9 4.7 4.8 4.6 4.7   Speed 5300 5300 5300 5300 5300 5300 5300   PI 3.1 3.1 3.1 3.2 3.1 3.6 2.8   Power (Centeno) 3.8 3.8 3.8 3.9 3.8 3.6 3.9   LSL 4900 4900 4900 4900 - 4900 4900   Pulsatility Intermittent pulse Intermittent pulse Intermittent pulse Intermittent pulse - - Intermittent pulse

## 2022-01-06 NOTE — PT/OT/SLP PROGRESS
Occupational Therapy      Patient Name:  Saba Lott   MRN:  [id number]    Pt attempted to be seen in PM. Pt found resting in bed and requested for therapy to return tomorrow. Pt reports he sat UIC earlier and ambulated in room. OT provided education on importance of OOB mobility and discussed OT POC with pt.Will follow-up as appropriate .    Brooke Fraire OTR/L  Pager: [phone]  1/6/2022

## 2022-01-06 NOTE — PT/OT/SLP PROGRESS
Physical Therapy Treatment    Patient Name:  Saba Lott   MRN:  [id number]    Recommendations:     Discharge Recommendations:  home health PT   Discharge Equipment Recommendations: none   Barriers to discharge: None    Assessment:     Saba Lott is a 56 y.o. male admitted with a medical diagnosis of Fever.  He presents with the following impairments/functional limitations:  weakness,impaired endurance,decreased lower extremity function,impaired functional mobilty,gait instability,decreased safety awareness. Pt with more difficulty standing from EOB today, required min A. Gait improved from last visit, ambulates 450ft with no A/D with CGA for safety and balance.    Rehab Prognosis: Good; patient would benefit from acute skilled PT services to address these deficits and reach maximum level of function.    Recent Surgery: * No surgery found *      Plan:     During this hospitalization, patient to be seen 3 x/week to address the identified rehab impairments via gait training,therapeutic activities,therapeutic exercises and progress toward the following goals:    · Plan of Care Expires:  01/31/22    Subjective     Chief Complaint: sleepiness  Patient/Family Comments/goals: Nobody lets me rest around here  Pain/Comfort:  · Pain Rating 1: 0/10      Objective:     Communicated with Nurse Lindquist prior to session.  Patient found supine with telemetry,peripheral IV,LVAD upon PT entry to room.     General Precautions: Standard, LVAD,fall   Orthopedic Precautions:N/A   Braces: N/A  Respiratory Status: Room air     Functional Mobility:  · Bed Mobility:     · Supine to Sit: modified independence  · Sit to Supine: modified independence  · Transfers:     · Sit to Stand:  minimum assistance with no AD  · Gait: 450 ft with no A/D, no rest breaks. Pt with mild imbalance when starting gait that improves during tx.  · Balance: Good sitting balance, CGA for balance during gait      AM-PAC 6 CLICK MOBILITY  Turning over in bed  (including adjusting bedclothes, sheets and blankets)?: 4  Sitting down on and standing up from a chair with arms (e.g., wheelchair, bedside commode, etc.): 3  Moving from lying on back to sitting on the side of the bed?: 4  Moving to and from a bed to a chair (including a wheelchair)?: 3  Need to walk in hospital room?: 3  Climbing 3-5 steps with a railing?: 3  Basic Mobility Total Score: 20       Therapeutic Activities and Exercises:    Educated patient on POC, verbalizes understanding   Instructed patient on performing sit<->stands from his chair throughout the day to improve strength and help with transfers.    Patient left up in chair with all lines intact, call button in reach and left LVAD on battery to assist with getting back to chair after pt naps.    GOALS:   Multidisciplinary Problems     Physical Therapy Goals        Problem: Physical Therapy Goal    Goal Priority Disciplines Outcome Goal Variances Interventions   Physical Therapy Goal     PT, PT/OT Ongoing, Progressing     Description: Goals to be met by: 2022     Patient will increase functional independence with mobility by performin. Sit to stand transfer with Modified [place]  2. Bed to chair transfer with Supervision using LRAD  3. Gait  x 150 feet with Supervision using LRAD with no seated rest breaks and VSS.   4. Ascend/descend 4 stair with no Handrails Stand-by Assistnce.   5. Lower extremity exercise program x30 reps per handout, with independence                     Time Tracking:     PT Received On: 22  PT Start Time: 1038     PT Stop Time: 1101  PT Total Time (min): 23 min     Billable Minutes: Gait Training 14 min and Therapeutic Activity 9 min    Treatment Type: Treatment  PT/PTA: PT     PTA Visit Number: 0     2022

## 2022-01-06 NOTE — SIGNIFICANT EVENT
Called by bedside RN who reported that patient's HR has jump to ~ 130. RRR on telemetry, but too much artifact to determine underlying rhythm. No longer has ICD to interrogate. BP 74/0, temp 100.6. Reports feeling a little weak, and is not eating or drinking much. Will give 250 cc IV NS and re eval.

## 2022-01-06 NOTE — ASSESSMENT & PLAN NOTE
Symptoms on admit: cough, chills, sputum production, nausea, vomiting x 1, weakness  -  Temp 103 12/31. 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 Site with no drainage  - Blood cxs from 1/1 with with Staph aureus. Blood cx 1/2 with GPC, and 1/3 and 1/4 with NGTD. Culture from drainage from new upper mid abd wound done 1/3 with few GNR  - Appreciate ID's help. - switched vanc to dapto 1/1 given hx of VISA. Cefepime switched to Meropenem 1/2  - CT c/a/p on admit with left basilar ground-glass and airspace opacities with additional slight ground-glass attenuation in the left upper lobe.    - Cough non productive, less frequent. Continue Duonebs

## 2022-01-06 NOTE — SUBJECTIVE & OBJECTIVE
Interval History:   Remains persistently febrile - t max 101.5  Blood cultures 1/1 - 2 of 4 bottles now showing MRSA  Blood cx 1/2 - 3 of 4 bottles with Staph Aureus  Blood cx 1/3 and 1/4 remain  NGTD.  Abdominal wound cultures - + Pseudomonas  Persistent cough COVID and RIP were negative.    Respiratory cx pending collection, though cough improved, unable to produce sputum  Tachycardic this afternoon.  Blood pressure stable. Fluids given  Complaining of non-specific abdominal discomfort, nausea. Reports liquid diarrhea X 4 times today      Review of Systems   Constitutional: Positive for activity change, appetite change (poor appetite) and fever. Negative for chills, diaphoresis and fatigue.   HENT: Negative for congestion, rhinorrhea, sore throat and trouble swallowing.    Eyes: Negative.    Respiratory: Positive for cough (improved). Negative for shortness of breath.    Cardiovascular: Negative for chest pain and leg swelling.   Gastrointestinal: Positive for abdominal pain (mild, non-specific), diarrhea (per pt X 4, liquid) and nausea. Negative for vomiting.   Genitourinary: Negative for difficulty urinating, dysuria, flank pain and hematuria.   Musculoskeletal: Negative for arthralgias, back pain, joint swelling, myalgias and neck pain.   Skin: Positive for wound (open wound epigastric area). Negative for rash.   Neurological: Negative for dizziness, weakness and headaches.   Psychiatric/Behavioral: Negative for agitation. The patient is not nervous/anxious.      Objective:     Vital Signs (Most Recent):  Temp: 99.1 °F (37.3 °C) (01/06/22 0445)  Pulse: 99 (01/06/22 0445)  Resp: 17 (01/06/22 0445)  BP: (!) 74/0 (01/06/22 0445)  SpO2: 95 % (01/06/22 0445) Vital Signs (24h Range):  Temp:  [98.3 °F (36.8 °C)-100.5 °F (38.1 °C)] 99.1 °F (37.3 °C)  Pulse:  [] 99  Resp:  [17-20] 17  SpO2:  [93 %-100 %] 95 %  BP: (74-84)/(0-57) 74/0     Weight: 89.5 kg (197 lb 5 oz)  Body mass index is 30.9 kg/m².    Estimated  Creatinine Clearance: 72.5 mL/min (based on SCr of 1.2 mg/dL).    Physical Exam  Vitals and nursing note reviewed.   Constitutional:       General: He is not in acute distress.     Appearance: He is well-developed. He is obese. He is ill-appearing. He is not toxic-appearing or diaphoretic.   HENT:      Head: Normocephalic and atraumatic.   Eyes:      General: No scleral icterus.     Conjunctiva/sclera: Conjunctivae normal.   Cardiovascular:      Rate and Rhythm: Tachycardia present.      Comments: VAD hum  Pulmonary:      Effort: Pulmonary effort is normal. No respiratory distress.      Breath sounds: No wheezing or rales.   Abdominal:      General: There is distension.      Palpations: Abdomen is soft.      Tenderness: There is abdominal tenderness (mild tenderness.  Unable to localize). There is no guarding.      Comments: Epigastric wound undressed - no active drainage.  No tenderness, erythema.  Surrounding induration.      DLES site dressed - c/d/i.  No tenderness along driveline    Photos of 1/4 reviewed. See below   Musculoskeletal:         General: No swelling or tenderness. Normal range of motion.      Cervical back: Normal range of motion.      Right lower leg: No edema.      Left lower leg: No edema.   Skin:     General: Skin is warm and dry.      Findings: No erythema or rash.   Neurological:      Mental Status: He is alert and oriented to person, place, and time.   Psychiatric:         Behavior: Behavior normal.                 Significant Labs: All pertinent labs within the past 24 hours have been reviewed.    Significant Imaging: I have reviewed all pertinent imaging results/findings within the past 24 hours.

## 2022-01-06 NOTE — PLAN OF CARE
Plan of care reviewed with patient and all questions answered, verbalizes understanding. VSS, LVAD numbers and DP WNL. Denies any CP, SOB, palpitations, dizziness, prn meds given for abdominal discomfort. Bed locked and in lowest position, side rails x2, personal belongings and call light within reach. Turning/repositioning independently in bed. Patient resting quietly. No acute distress noted.
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
## 2022-01-06 NOTE — PROGRESS NOTES
UPDATE    SW to Pt's room for ongoing support & needs assessment. Pt presents as AAO x4, calm, cooperative, and asking and answering questions appropriately. Pt reports he is tired today and not feeling very well. Pt declines long conversation with SW at this time. Pt shares he is coping well with hospitalization and denies having any questions or needs. SW encouraged Pt to reach out if needs or questions arise; Pt v/u & expressed appreciation for support. SW providing ongoing psychosocial, counseling, & emotional support, education, resources, assistance, and discharge planning as indicated.  SW to continue to follow.

## 2022-01-06 NOTE — PROGRESS NOTES
[facility] - [facility]iology Stepdown  Heart Transplant  Progress Note    Patient Name: Saba Lott  MRN: [id number]  Admission Date: 12/31/2021  Hospital Length of Stay: 6 days  Attending Physician: Zulma Floyd MD  Primary Care Provider: Mary Lombardi MD  Principal Problem:Fever    Subjective:     **Interval History: T max 100.5 past 24 hours. WCC still 21K. Blood cxs 1/2 with GPC and cxs from new upper mid abd wound with few GNR. Blood cxs 1/3 and 1/4 with NGTD. Continues on Meropenem and Dapto. INR still drifting up at 3.0 despite holding Coumadin. 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 Continues to feel better than admit. Still with non productive cough but less frequent. Some nausea this morning    Continuous Infusions:  Scheduled Meds:   albuterol-ipratropium  3 mL Nebulization Q4H    atorvastatin  20 mg Oral QHS    DAPTOmycin (CUBICIN)  IV  10 mg/kg Intravenous Q24H    docusate sodium  100 mg Oral Daily    Lactobacillus rhamnosus GG  1 capsule Oral Daily    magnesium oxide  400 mg Oral BID    meropenem (MERREM) IVPB  2 g Intravenous Q8H    mupirocin   Nasal BID     PRN Meds:acetaminophen, benzonatate, guaiFENesin 100 mg/5 ml, ondansetron    Review of patient's allergies indicates:   Allergen Reactions    Iodine and iodide containing products      Objective:     Vital Signs (Most Recent):  Temp: 98.1 °F (36.7 °C) (01/06/22 0743)  Pulse: (!) 118 (01/06/22 0717)  Resp: 16 (01/06/22 0743)  BP: (!) 72/0 (01/06/22 0743)  SpO2: 99 % (01/06/22 0743) Vital Signs (24h Range):  Temp:  [98.1 °F (36.7 °C)-100.5 °F (38.1 °C)] 98.1 °F (36.7 °C)  Pulse:  [] 118  Resp:  [16-18] 16  SpO2:  [93 %-100 %] 99 %  BP: (72-84)/(0-57) 72/0     Patient Vitals for the past 72 hrs (Last 3 readings):   Weight   01/05/22 0800 89.5 kg (197 lb 5 oz)   01/04/22 0800 88.4 kg (194 lb 14.2 oz)     Body mass index is 30.9 kg/m².      Intake/Output Summary (Last 24 hours) at 1/6/2022  0827  Last data filed at 1/6/2022 0357  Gross per 24 hour   Intake 932.29 ml   Output 850 ml   Net 82.29 ml       Hemodynamic Parameters:       Telemetry: ST with PVC's    Physical Exam  Constitutional:       Appearance: Normal appearance.   HENT:      Head: Normocephalic and atraumatic.   Eyes:      Conjunctiva/sclera: Conjunctivae normal.   Neck:      Comments: Neck veins are not elevated  Cardiovascular:      Rate and Rhythm: Regular rhythm. Tachycardia present.      Comments: Smooth VAD hum  Pulmonary:      Effort: Pulmonary effort is normal.      Breath sounds: Normal breath sounds.   Abdominal:      General: Bowel sounds are normal.      Palpations: Abdomen is soft.   Musculoskeletal:         General: No swelling. Normal range of motion.      Cervical back: Normal range of motion and neck supple.   Skin:     General: Skin is warm and dry.      Capillary Refill: Capillary refill takes 2 to 3 seconds.   Neurological:      General: No focal deficit present.      Mental Status: He is alert and oriented to person, place, and time.   Psychiatric:         Mood and Affect: Mood normal.         Behavior: Behavior normal.         Thought Content: Thought content normal.         Judgment: Judgment normal.         Significant Labs:  CBC:  Recent Labs   Lab 01/04/22  0420 01/05/22  0507 01/06/22  0459   WBC 19.85* 22.11* 21.74*   RBC 5.16 4.60 4.37*   HGB 9.0* 8.0* 7.7*   HCT 31.2* 28.2* 27.4*   * 545* 541*   MCV 61* 61* 63*   MCH 17.4* 17.4* 17.6*   MCHC 28.8* 28.4* 28.1*     BNP:  Recent Labs   Lab 01/01/22  0020 01/03/22  0517 01/05/22  0507   * 400* 381*     CMP:  Recent Labs   Lab 01/01/22  0020 01/01/22  1132 01/03/22  0517 01/03/22  0518 01/04/22  0420 01/05/22  0507 01/06/22  0459      < >  --    < > 100 101 96   CALCIUM 8.5*   < >  --    < > 8.6* 8.3* 8.2*   ALBUMIN 2.6*  --  2.4*  --   --  2.3*  --    PROT 7.9  --  6.7  --   --  6.6  --    *   < >  --    < > 132* 131* 136   K 2.9*   < >   --    < > 4.3 4.1 4.5   CO2 33*   < >  --    < > 29 27 25   CL 86*   < >  --    < > 94* 97 100   BUN 7   < >  --    < > 12 11 10   CREATININE 0.9   < >  --    < > 1.4 1.2 1.1   ALKPHOS 106  --  89  --   --  81  --    ALT 11  --  8*  --   --  10  --    AST 17  --  13  --   --  17  --    BILITOT 1.2*  --  0.8  --   --  0.6  --     < > = values in this interval not displayed.      Coagulation:   Recent Labs   Lab 01/04/22  0420 01/05/22  0507 01/06/22  0459   INR 2.1* 2.6* 3.0*   APTT 38.6* 44.4* 35.0*     LDH:  Recent Labs   Lab 01/04/22  0420 01/05/22  0507 01/06/22  0459   * 293* 378*     Microbiology:  Microbiology Results (last 7 days)     Procedure Component Value Units Date/Time    Aerobic culture [[id number]]  (Abnormal) Collected: 01/03/22 1822    Order Status: Completed Specimen: Wound from Abdomen Updated: 01/06/22 0721     Aerobic Bacterial Culture GRAM NEGATIVE LADI  Few  Identification and susceptibility pending      Blood culture [[id number]] Collected: 01/02/22 1946    Order Status: Completed Specimen: Blood Updated: 01/05/22 2212     Blood Culture, Routine No Growth to date      No Growth to date      No Growth to date      No Growth to date    Blood culture [[id number]] Collected: 01/03/22 1723    Order Status: Completed Specimen: Blood Updated: 01/05/22 2012     Blood Culture, Routine No Growth to date      No Growth to date      No Growth to date    Blood culture [[id number]] Collected: 01/03/22 1723    Order Status: Completed Specimen: Blood Updated: 01/05/22 2012     Blood Culture, Routine No Growth to date      No Growth to date      No Growth to date    Blood culture [[id number]]  (Abnormal) Collected: 01/01/22 0020    Order Status: Completed Specimen: Blood Updated: 01/05/22 1237     Blood Culture, Routine Gram stain aer bottle: Gram positive cocci in clusters resembling Staph       Results called to and read back by:DANIEL BLANCHARD 01/02/2022  23:29      STAPHYLOCOCCUS AUREUS  Susceptibility  pending  ID consult required at [facility].      Blood culture [[id number]]  (Abnormal) Collected: 01/01/22 0020    Order Status: Completed Specimen: Blood Updated: 01/05/22 1237     Blood Culture, Routine Gram stain aer bottle: Gram positive cocci       Results called to and read back by: DANIEL BLANCHARD RN  01/02/2022  22:36      STAPHYLOCOCCUS AUREUS  Susceptibility pending  ID consult required at [facility].      Blood culture [[id number]] Collected: 01/02/22 1946    Order Status: Completed Specimen: Blood Updated: 01/05/22 1139     Blood Culture, Routine Gram stain uzma bottle: Gram positive cocci in clusters resembling Staph      Results called to and read back by:Khai Durand RN 01/04/2022  22:54      Gram stain aer bottle: Gram positive cocci       Positive results previously called  01/05/2022    Culture, Respiratory with Gram Stain [[id number]]     Order Status: No result Specimen: Respiratory from Sputum, Expectorated     Blood culture [[id number]] Collected: 01/04/22 0420    Order Status: Completed Specimen: Blood Updated: 01/05/22 1012     Blood Culture, Routine No Growth to date      No Growth to date    Blood culture [[id number]] Collected: 01/04/22 0425    Order Status: Completed Specimen: Blood Updated: 01/05/22 1012     Blood Culture, Routine No Growth to date      No Growth to date    Culture, Anaerobe [[id number]] Collected: 01/03/22 1822    Order Status: Completed Specimen: Wound from Abdomen Updated: 01/05/22 0725     Anaerobic Culture Culture in progress    Respiratory Infection Panel (PCR), Nasopharyngeal [[id number]] Collected: 01/03/22 1822    Order Status: Completed Specimen: Nasopharyngeal Swab Updated: 01/03/22 2107     Respiratory Infection Panel Source NP Swab     Adenovirus Not Detected     Coronavirus 229E, Common Cold Virus Not Detected     Coronavirus HKU1, Common Cold Virus Not Detected     Coronavirus NL63, Common Cold Virus Not  Detected     Coronavirus OC43, Common Cold Virus Not Detected     Comment: The Coronavirus strains detected in this test cause the common cold.  These strains are not the COVID-19 (novel Coronavirus)strain   associated with the respiratory disease outbreak.          Human Metapneumovirus Not Detected     Human Rhinovirus/Enterovirus Not Detected     Influenza A (subtypes H1, H1-2009,H3) Not Detected     Influenza B Not Detected     Parainfluenza Virus 1 Not Detected     Parainfluenza Virus 2 Not Detected     Parainfluenza Virus 3 Not Detected     Parainfluenza Virus 4 Not Detected     Respiratory Syncytial Virus Not Detected     Bordetella Parapertussis (XJ1263) Not Detected     Bordetella pertussis (ptxP) Not Detected     Chlamydia pneumoniae Not Detected     Mycoplasma pneumoniae Not Detected    Narrative:      For all other respiratory sources, order [id number] -  Respiratory Viral Panel by PCR          I have reviewed all pertinent labs within the past 24 hours.    Estimated Creatinine Clearance: 79.1 mL/min (based on SCr of 1.1 mg/dL).    Diagnostic Results:  I have reviewed all pertinent imaging results/findings within the past 24 hours.    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 Patient is short of breath and uncomfortable at the time of giving history. He reports chills, weakness, cough, sputum production, weight gain, nausea, vomiting and trouble breathing. He went to his PCP office yesterday where his temp was 103 and was sent to the hospital afterwards. His white count is 20 on arrival. He is unable to lay flat. His HR is 102-142 sinus. He needs to be diuresed upon arrival but K is 2.9, ordered IV and PO Kcl, he vomited out oral potassium, IV  replacement will be done.       * Fever  Symptoms on admit: cough, chills, sputum production, nausea, vomiting x 1, weakness  -  Temp 103 12/31. 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 Site with no drainage  - Blood cxs from 1/1 with with Staph aureus. Blood cx 1/2 with GPC, and 1/3 and 1/4 with NGTD. Culture from drainage from new upper mid abd wound done 1/3 with few GNR  - Appreciate ID's help. - switched vanc to dapto 1/1 given hx of VISA. Cefepime switched to Meropenem 1/2  - CT c/a/p on admit with left basilar ground-glass and airspace opacities with additional slight ground-glass attenuation in the left upper lobe.    - Cough non productive, less frequent. Continue Duonebs            Sepsis  -See fever    Hypokalemia  - K today 4.5  - Aldactone was increased to 50 mg qd 1/1 bc of hypokalemia. With Cr above baseline at 1.4, decreased back to 25 mg qd 1/3, which was then discontinued 1/4   - Cr improved to 1.1 today    LVAD (left ventricular assist device) present  -S/P DT HM3 with MVR 2/2/20  -Current speed 5300  -INR goal 1.5-2.0 (hx of ICH). INR 3.0, Coumadin is on hold  -LDH stable  -ECHO 10/9/21 with EF 20%, BRYAN, IVS bows into RV, G1DD, s/p Alferi stitch, mild-moderate RVSF, CVP 3, LVEDD 6.43 with LVAD speed set to 5300 rpm        Procedure: Device Interrogation Including analysis of device parameters  Current Settings: Ventricular Assist Device  Review of device function is stable      TXP LVAD INTERROGATIONS 1/6/2022 1/6/2022 1/6/2022 1/5/2022 1/5/2022 1/5/2022 1/5/2022   Type HeartMate3 HeartMate3 HeartMate3 HeartMate3 - - HeartMate3   Flow 4.8 4.7 4.9 4.7 4.8 4.6 4.7   Speed 5300 5300 5300 5300 5300 5300 5300   PI 3.1 3.1 3.1  3.2 3.1 3.6 2.8   Power (Centeno) 3.8 3.8 3.8 3.9 3.8 3.6 3.9   LSL 4900 4900 4900 4900 - 4900 4900   Pulsatility Intermittent pulse Intermittent pulse Intermittent pulse Intermittent pulse - - Intermittent [place]           [place] [id number]  Heart Transplant  [facility] - Cardiology Stepdown

## 2022-01-06 NOTE — PROGRESS NOTES
[facility] - Cardiology Stepdown  Infectious Disease  Progress Note    Patient Name: Saba Lott  MRN: [id number]  Admission Date: 12/31/2021  Length of Stay: 5 days  Attending Physician: Zulma Floyd MD  Primary Care Provider: Mary Lombardi MD    Isolation Status: No active isolations  Assessment/Plan:      * Fever     56-year-old male with history of DCM s/p LVAD 2/6/2020, left occipital IPH, lead endocarditis s/p removal, history of VISA and polymicrobial  bacteremia in October (pseudomonas, e coli, proteus.  Strongy negative) on chronic suppressive doxy, presents from clinic with fevers.    Blood cultures of 1/1 - 2/4 bottles + Staph Aureus - susceptibility pending  Blood cultures 1/2 - 2/4 positive GPC  Blood cultures  1/3, 1/4  - NGTD     CT A/P 1/2/22 - unchanged small region of soft tissue induration and slight skin thickening about the drive line in anterior abdominal wall. No definite focal fluid collection about the LVAD. No definite new regions of soft tissue induration or focal fluid collections are identified about the remaining subcutaneous course of the LVAD driveline  Noted patchy LLL GGO and airspace opacities with additional patchy ground glass attenuation in DEVORAH  Slight non-specific perinephric fat stranding - U/A wnl     Still with persistent fever but denies rigors/sweats.   + cough.  Repeat CXR today unchanged.  No other complaints.   Upper abdominal wound cultures now with GNR, ID pending.  No drainage from DLES.  Patient continues to report he feels better than admission    Plan/recommendations:  1.   Continue IV daptomycin 10 mg/kg for now pending S. Aureus susceptibilities. Weekly CK while on dapto  2.   Continue meropenem for now pending new wound culture.   3.   Will follow blood cultures, abdominal wound cultures and adjust abx accordingly.      Data reviewed and plan discussed with ID staff, Dr. Kim  Secure chat with Primary Team, CONG Ruiz          Thank you.   Please  call for any questions or concerns.  Lindsay Rowan, APRN, ANP-C  Spectra [phone]    Subjective:     Principal Problem:Fever    HPI: Mr. 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 Pt reports feeling weak since day prior, fevers, chills, diarrhea and vomiting x 1. Denies sore throat, HA, abdominal pain, dysuria, sob or cough. No sick contacts. No increased drainage from DLES. Lives alone.    In the ED pt febrile, tachycardic and with WBC 20. CXR with b/l edema.           Interval History:   No acute events overnight.  T max 100.8. Persistent low grade fever during day   WBC 19>22  Blood cultures 1/1 - 2 of 4 bottles now showing Staph Aureus  Blood cx 1/2 - 2 of 4 bottles with GPC  Blood cx 1/3 and 1/4 NGTD.  Abdominal wound cultures pending -showing GNR    Cough worsened.  COVID and RIP were negative  CXR today without change.   Reports cough improved this afternoon with cough suppressant.  Denies rigors, sweats.  Reports he still feels better than on admission.     Review of Systems   Constitutional: Positive for activity change, appetite change (poor appetite) and fever. Negative for chills, diaphoresis and fatigue.   HENT: Negative for congestion, rhinorrhea, sore throat and trouble swallowing.    Eyes: Negative.    Respiratory: Positive for cough. Negative for shortness of breath.    Cardiovascular: Negative for chest pain and leg swelling.   Gastrointestinal: Negative for abdominal pain, diarrhea, nausea and vomiting.   Genitourinary: Negative for difficulty urinating,  dysuria, flank pain and hematuria.   Musculoskeletal: Negative for arthralgias, back pain, joint swelling, myalgias and neck pain.   Skin: Positive for wound (open wound epigastric area). Negative for rash.   Neurological: Negative for dizziness, weakness and headaches.   Psychiatric/Behavioral: Negative for agitation. The patient is not nervous/anxious.      Objective:     Vital Signs (Most Recent):  Temp: 98.3 °F (36.8 °C) (01/05/22 0723)  Pulse: 78 (01/05/22 0831)  Resp: 18 (01/05/22 0831)  BP: (!) 76/0 (01/05/22 0755)  SpO2: 97 % (01/05/22 0831) Vital Signs (24h Range):  Temp:  [98.3 °F (36.8 °C)-100.8 °F (38.2 °C)] 98.3 °F (36.8 °C)  Pulse:  [] 78  Resp:  [16-20] 18  SpO2:  [93 %-98 %] 97 %  BP: (74-76)/(0) 76/0     Weight: 89.5 kg (197 lb 5 oz)  Body mass index is 30.9 kg/m².    Estimated Creatinine Clearance: 72.5 mL/min (based on SCr of 1.2 mg/dL).    Physical Exam  Vitals and nursing note reviewed.   Constitutional:       General: He is not in acute distress.     Appearance: He is well-developed. He is obese. He is not ill-appearing, toxic-appearing or diaphoretic.   HENT:      Head: Normocephalic and atraumatic.   Eyes:      General: No scleral icterus.     Conjunctiva/sclera: Conjunctivae normal.   Cardiovascular:      Rate and Rhythm: Normal rate.      Comments: VAD hum  Pulmonary:      Effort: Pulmonary effort is normal. No respiratory distress.      Breath sounds: No wheezing or rales.   Abdominal:      General: There is no distension.      Palpations: Abdomen is soft.      Tenderness: There is no abdominal tenderness. There is no guarding.      Comments: Epigastric wound dressed- c/d/i.  No surrounding tenderness or erythema   DLES site dressed - c/d/i.  No tenderness along drivelin    Photos of 1/4 reviewed. See below   Musculoskeletal:         General: No swelling or tenderness. Normal range of motion.      Cervical back: Normal range of motion.      Right lower leg: No edema.      Left lower  leg: No edema.   Skin:     General: Skin is warm and dry.      Findings: No erythema or rash.   Neurological:      Mental Status: He is alert and oriented to person, place, and time.   Psychiatric:         Behavior: Behavior normal.                 Significant Labs: All pertinent labs within the past 24 hours have been reviewed.    Significant Imaging: I have reviewed all pertinent imaging results/findings within the past 24 hours.

## 2022-01-06 NOTE — NURSING
Notified from telemetry that pt is sustaining a HR of 130s. JUAN Lugo made aware, came to bedside to assess pt, pt asymptomatic; and order of 250 mL bolus of NS ordered, will continue to monitor pt.

## 2022-01-06 NOTE — PROGRESS NOTES
Interdisciplinary Rounds Report:   Attended interdisciplinary rounds with the [facility]/CTS services including the LVAD Coordinators, social workers, cardiologists, surgeons,  PT/OT/Speech, dietician, and unit charge nurses. Discussed patient status, plan of care, goals of care, including DC date, and post discharge needs. Plan of care will be discussed with the patient and/or family per the physician while rounding on the floor. This is a recurring meeting that is medically and socially necessary to collaborate with the interdisciplinary team to assist patient needs and safe discharge.

## 2022-01-06 NOTE — ASSESSMENT & PLAN NOTE
- K today 4.5  - Aldactone was increased to 50 mg qd 1/1 bc of hypokalemia. With Cr above baseline at 1.4, decreased back to 25 mg qd 1/3, which was then discontinued 1/4   - Cr improved to 1.1 today
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
## 2022-01-06 NOTE — SUBJECTIVE & OBJECTIVE
**Interval History: T max 100.5 past 24 hours. WCC still 21K. Blood cxs 1/2 with GPC and cxs from new upper mid abd wound with few GNR. Blood cxs 1/3 and 1/4 with NGTD. Continues on Meropenem and Dapto. Continues to feel better than admit. Still with non productive cough but less frequent. Some nausea this morning    Continuous Infusions:  Scheduled Meds:   albuterol-ipratropium  3 mL Nebulization Q4H    atorvastatin  20 mg Oral QHS    DAPTOmycin (CUBICIN)  IV  10 mg/kg Intravenous Q24H    docusate sodium  100 mg Oral Daily    Lactobacillus rhamnosus GG  1 capsule Oral Daily    magnesium oxide  400 mg Oral BID    meropenem (MERREM) IVPB  2 g Intravenous Q8H    mupirocin   Nasal BID     PRN Meds:acetaminophen, benzonatate, guaiFENesin 100 mg/5 ml, ondansetron    Review of patient's allergies indicates:   Allergen Reactions    Iodine and iodide containing products      Objective:     Vital Signs (Most Recent):  Temp: 98.1 °F (36.7 °C) (01/06/22 0743)  Pulse: (!) 118 (01/06/22 0717)  Resp: 16 (01/06/22 0743)  BP: (!) 72/0 (01/06/22 0743)  SpO2: 99 % (01/06/22 0743) Vital Signs (24h Range):  Temp:  [98.1 °F (36.7 °C)-100.5 °F (38.1 °C)] 98.1 °F (36.7 °C)  Pulse:  [] 118  Resp:  [16-18] 16  SpO2:  [93 %-100 %] 99 %  BP: (72-84)/(0-57) 72/0     Patient Vitals for the past 72 hrs (Last 3 readings):   Weight   01/05/22 0800 89.5 kg (197 lb 5 oz)   01/04/22 0800 88.4 kg (194 lb 14.2 oz)     Body mass index is 30.9 kg/m².      Intake/Output Summary (Last 24 hours) at 1/6/2022 0848  Last data filed at 1/6/2022 0357  Gross per 24 hour   Intake 932.29 ml   Output 850 ml   Net 82.29 ml       Hemodynamic Parameters:       Telemetry: ST with PVC's    Physical Exam  Constitutional:       Appearance: Normal appearance.   HENT:      Head: Normocephalic and atraumatic.   Eyes:      Conjunctiva/sclera: Conjunctivae normal.   Neck:      Comments: Neck veins are not elevated  Cardiovascular:      Rate and Rhythm: Regular  rhythm. Tachycardia present.      Comments: Smooth VAD hum  Pulmonary:      Effort: Pulmonary effort is normal.      Breath sounds: Normal breath sounds.   Abdominal:      General: Bowel sounds are normal.      Palpations: Abdomen is soft.   Musculoskeletal:         General: No swelling. Normal range of motion.      Cervical back: Normal range of motion and neck supple.   Skin:     General: Skin is warm and dry.      Capillary Refill: Capillary refill takes 2 to 3 seconds.   Neurological:      General: No focal deficit present.      Mental Status: He is alert and oriented to person, place, and time.   Psychiatric:         Mood and Affect: Mood normal.         Behavior: Behavior normal.         Thought Content: Thought content normal.         Judgment: Judgment normal.         Significant Labs:  CBC:  Recent Labs   Lab 01/04/22  0420 01/05/22  0507 01/06/22  0459   WBC 19.85* 22.11* 21.74*   RBC 5.16 4.60 4.37*   HGB 9.0* 8.0* 7.7*   HCT 31.2* 28.2* 27.4*   * 545* 541*   MCV 61* 61* 63*   MCH 17.4* 17.4* 17.6*   MCHC 28.8* 28.4* 28.1*     BNP:  Recent Labs   Lab 01/01/22  0020 01/03/22  0517 01/05/22  0507   * 400* 381*     CMP:  Recent Labs   Lab 01/01/22  0020 01/01/22  1132 01/03/22  0517 01/03/22  0518 01/04/22  0420 01/05/22  0507 01/06/22  0459      < >  --    < > 100 101 96   CALCIUM 8.5*   < >  --    < > 8.6* 8.3* 8.2*   ALBUMIN 2.6*  --  2.4*  --   --  2.3*  --    PROT 7.9  --  6.7  --   --  6.6  --    *   < >  --    < > 132* 131* 136   K 2.9*   < >  --    < > 4.3 4.1 4.5   CO2 33*   < >  --    < > 29 27 25   CL 86*   < >  --    < > 94* 97 100   BUN 7   < >  --    < > 12 11 10   CREATININE 0.9   < >  --    < > 1.4 1.2 1.1   ALKPHOS 106  --  89  --   --  81  --    ALT 11  --  8*  --   --  10  --    AST 17  --  13  --   --  17  --    BILITOT 1.2*  --  0.8  --   --  0.6  --     < > = values in this interval not displayed.      Coagulation:   Recent Labs   Lab 01/04/22  4019  01/05/22  0507 01/06/22  0459   INR 2.1* 2.6* 3.0*   APTT 38.6* 44.4* 35.0*     LDH:  Recent Labs   Lab 01/04/22  0420 01/05/22  0507 01/06/22  0459   * 293* 378*     Microbiology:  Microbiology Results (last 7 days)     Procedure Component Value Units Date/Time    Aerobic culture [[id number]]  (Abnormal) Collected: 01/03/22 1822    Order Status: Completed Specimen: Wound from Abdomen Updated: 01/06/22 0721     Aerobic Bacterial Culture GRAM NEGATIVE LADI  Few  Identification and susceptibility pending      Blood culture [[id number]] Collected: 01/02/22 1946    Order Status: Completed Specimen: Blood Updated: 01/05/22 2212     Blood Culture, Routine No Growth to date      No Growth to date      No Growth to date      No Growth to date    Blood culture [[id number]] Collected: 01/03/22 1723    Order Status: Completed Specimen: Blood Updated: 01/05/22 2012     Blood Culture, Routine No Growth to date      No Growth to date      No Growth to date    Blood culture [[id number]] Collected: 01/03/22 1723    Order Status: Completed Specimen: Blood Updated: 01/05/22 2012     Blood Culture, Routine No Growth to date      No Growth to date      No Growth to date    Blood culture [[id number]]  (Abnormal) Collected: 01/01/22 0020    Order Status: Completed Specimen: Blood Updated: 01/05/22 1237     Blood Culture, Routine Gram stain aer bottle: Gram positive cocci in clusters resembling Staph       Results called to and read back by:DANIEL BLANCHARD 01/02/2022  23:29      STAPHYLOCOCCUS AUREUS  Susceptibility pending  [facility].      Blood culture [[id number]]  (Abnormal) Collected: 01/01/22 0020    Order Status: Completed Specimen: Blood Updated: 01/05/22 1237     Blood Culture, Routine Gram stain aer bottle: Gram positive cocci       Results called to and read back by: DANIEL BLANCHARD RN  01/02/2022  22:36      STAPHYLOCOCCUS AUREUS  Susceptibility pending  ID consult required at  [facility] and [facility] locations.      Blood culture [[id number]] Collected: 01/02/22 1946    Order Status: Completed Specimen: Blood Updated: 01/05/22 1139     Blood Culture, Routine Gram stain uzma bottle: Gram positive cocci in clusters resembling Staph      Results called to and read back by:Khai Durand RN 01/04/2022  22:54      Gram stain aer bottle: Gram positive cocci       Positive results previously called  01/05/2022    Culture, Respiratory with Gram Stain [[id number]]     Order Status: No result Specimen: Respiratory from Sputum, Expectorated     Blood culture [[id number]] Collected: 01/04/22 0420    Order Status: Completed Specimen: Blood Updated: 01/05/22 1012     Blood Culture, Routine No Growth to date      No Growth to date    Blood culture [[id number]] Collected: 01/04/22 0425    Order Status: Completed Specimen: Blood Updated: 01/05/22 1012     Blood Culture, Routine No Growth to date      No Growth to date    Culture, Anaerobe [[id number]] Collected: 01/03/22 1822    Order Status: Completed Specimen: Wound from Abdomen Updated: 01/05/22 0725     Anaerobic Culture Culture in progress    Respiratory Infection Panel (PCR), Nasopharyngeal [[id number]] Collected: 01/03/22 1822    Order Status: Completed Specimen: Nasopharyngeal Swab Updated: 01/03/22 2107     Respiratory Infection Panel Source NP Swab     Adenovirus Not Detected     Coronavirus 229E, Common Cold Virus Not Detected     Coronavirus HKU1, Common Cold Virus Not Detected     Coronavirus NL63, Common Cold Virus Not Detected     Coronavirus OC43, Common Cold Virus Not Detected     Comment: The Coronavirus strains detected in this test cause the common cold.  These strains are not the COVID-19 (novel Coronavirus)strain   associated with the respiratory disease outbreak.          Human Metapneumovirus Not Detected     Human Rhinovirus/Enterovirus Not Detected     Influenza A (subtypes H1, H1-2009,H3) Not Detected     Influenza B Not Detected      Parainfluenza Virus 1 Not Detected     Parainfluenza Virus 2 Not Detected     Parainfluenza Virus 3 Not Detected     Parainfluenza Virus 4 Not Detected     Respiratory Syncytial Virus Not Detected     Bordetella Parapertussis (MP2115) Not Detected     Bordetella pertussis (ptxP) Not Detected     Chlamydia pneumoniae Not Detected     Mycoplasma pneumoniae Not Detected    Narrative:      For all other respiratory sources, order [id number] -  Respiratory Viral Panel by PCR          I have reviewed all pertinent labs within the past 24 hours.    Estimated Creatinine Clearance: 79.1 mL/min (based on SCr of 1.1 mg/dL).    Diagnostic Results:  I have reviewed all pertinent imaging results/findings within the past 24 hours.

## 2022-01-07 NOTE — ASSESSMENT & PLAN NOTE
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 INR 1.5 today after vitamin K yesterday (INR 3.0 despite holding coumadin)  -LDH stable  -ECHO 10/9/21 with EF 20%, BRYAN, IVS bows into RV, G1DD, s/p Alferi stitch, mild-moderate RVSF, CVP 3, LVEDD 6.43 with LVAD speed set to 5300 rpm        Procedure: Device Interrogation Including analysis of device parameters  Current Settings: Ventricular Assist Device  Review of device function is stable      TXP LVAD INTERROGATIONS 1/7/2022 1/7/2022 1/6/2022 1/6/2022 1/6/2022 1/6/2022 1/5/2022   Type HeartMate3 HeartMate3 HeartMate3 HeartMate3 HeartMate3 HeartMate3 HeartMate3   Flow 4.7 4.7 4.6 4.8 4.7 4.9 4.7   Speed 5300 5300 5300 5300 5300 5300 5300   PI 2.8 2.9 3.0 3.1 3.1 3.1 3.2   Power (Centeno) 3.9 3.8 3.8 3.8 3.8 3.8 3.9   LSL 4900 4900 4900 4900 4900 4900 4900   Pulsatility - Intermittent pulse Intermittent pulse Intermittent pulse Intermittent pulse Intermittent pulse Intermittent pulse

## 2022-01-07 NOTE — CARE UPDATE
"RAPID RESPONSE NURSE CHART REVIEW        Chart Reviewed: 01/06/2022, 6:03 PM    MRN: [id number]  Bed: 302/302 A    Dx: Fever    Saba Lott has a past medical history of Acute decompensated heart failure, Encounter for blood transfusion, Left ventricular assist device complication, LVAD (left ventricular assist device) present, and Presence of left ventricular assist device (LVAD).    Last VS: BP (!) 80/0 (Patient Position: Standing)   Pulse (!) 133   Temp (!) 100.6 °F (38.1 °C) (Oral)   Resp 18   Ht 5' 7.01" (1.702 m)   Wt 89.5 kg (197 lb 5 oz)   SpO2 (!) 91%   BMI 30.90 kg/m²     24H Vital Sign Range:  Temp:  [98.1 °F (36.7 °C)-101.5 °F (38.6 °C)]   Pulse:  []   Resp:  [16-18]   BP: (70-98)/(0-54)   SpO2:  [91 %-99 %]     Level of Consciousness (AVPU): alert    Recent Labs     01/04/22  0420 01/05/22  0507 01/06/22  0459   WBC 19.85* 22.11* 21.74*   HGB 9.0* 8.0* 7.7*   HCT 31.2* 28.2* 27.4*   * 545* 541*       Recent Labs     01/04/22  0420 01/05/22  0507 01/06/22  0459   * 131* 136   K 4.3 4.1 4.5   CL 94* 97 100   CO2 29 27 25   CREATININE 1.4 1.2 1.1    101 96   MG 2.2 2.0 2.0        No results for input(s): PH, PCO2, PO2, HCO3, POCSATURATED, BE in the last 72 hours.     OXYGEN:  Flow (L/min): 2     O2 Device (Oxygen Therapy): room air    MEWS score: 2    Spoke w/ charge Magda CANALES. No acute concerns at this time. Please call [phone] for further concerns or assistance.    Lilliana Mclaughlin RN        "

## 2022-01-07 NOTE — NURSING
Patient alert and responsive to nursing care.  Vital signs WNL except elevated HR, HTS aware.  C/o of pain and discomfort.    No cardiopulmomary distress.  LVAD functioning properly.  LVAD numbers WNL. No alarms.  Dopplers in LVAD Dressing CDI  PIV patent and saline locked.  Medication given per MD. Assistance with ADL care as needed.  Up to bathroom and void via urinal.  Fall precaution maintained.  Call light and bedside table within reach. Will continue to monitor for changes of condition

## 2022-01-07 NOTE — PROGRESS NOTES
[facility] - Cardiology Stepdown  Infectious Disease  Progress Note    Patient Name: Saba Lott  MRN: [id number]  Admission Date: 12/31/2021  Length of Stay: 6 days  Attending Physician: Zulma Floyd MD  Primary Care Provider: Mary Lombardi MD    Isolation Status: Special Contact  Assessment/Plan:      * Fever     56-year-old male with history of DCM s/p LVAD 2/6/2020, left occipital IPH, lead endocarditis s/p removal, history of VISA and polymicrobial  bacteremia in October (pseudomonas, e coli, proteus.  Strongy negative) on chronic suppressive doxy, presents from clinic with fevers.    Blood cultures of 1/1 - 2/4 bottles + MRSA  Blood cultures 1/2 - 3/4 positive Staph Aureus  Blood cultures  1/3, 1/4  - NGTD   Abdominal wound cx - + Pseudomonas     CT A/P 1/2/22 - unchanged small region of soft tissue induration and slight skin thickening about the drive line in anterior abdominal wall. No definite focal fluid collection about the LVAD. No definite new regions of soft tissue induration or focal fluid collections are identified about the remaining subcutaneous course of the LVAD driveline  Noted patchy LLL GGO and airspace opacities with additional patchy ground glass attenuation in DEVORAH  Slight non-specific perinephric fat stranding - U/A wnl     Still with persistent fever and leukocytosis.   Denies rigors or sweats. Cough improved. Non-productive, unable to produce sputum for cx. Repeat CXR 1/5 unchanged.  COVID negative, RIP panel negative.  Feels poorly this afternoon.  Tachycardic, b/p stable.  Orthostatics negative.  Complaining of non-specific abdominal discomfort, nausea. Reports liquid diarrhea X 4 times today.       Plan/recommendations:  1.   Continue IV daptomycin 10 mg/kg for now MRSA bacteremia. Confirmed susceptibility with Micro lab.  KALI <.5.   Weekly CK while on dapto  2.   Discontinue IV meropenem, and start IV zosyn for pseudomonas from epigastric abdominal wound   3.   Stool for c dif  ordered if persistent diarrhea   4.   Repeat blood cultures ordered  5.   Will follow       Data reviewed and plan discussed with ID staff, Dr. Kim  Secure chat with Primary Team, Priti Lugo NP          MRSA bacteremia  See assessment/plan above      Thank you.   Please call for any questions or concerns.  DOMINGO Cabezas, ANP-C  Spectra [phone]    Subjective:     Principal Problem:Fever    HPI: Mr. 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 Pt reports feeling weak since day prior, fevers, chills, diarrhea and vomiting x 1. Denies sore throat, HA, abdominal pain, dysuria, sob or cough. No sick contacts. No increased drainage from DLES. Lives alone.    In the ED pt febrile, tachycardic and with WBC 20. CXR with b/l edema.           Interval History:   Remains persistently febrile - t max 101.5  Blood cultures 1/1 - 2 of 4 bottles now showing MRSA  Blood cx 1/2 - 3 of 4 bottles with Staph Aureus  Blood cx 1/3 and 1/4 remain  NGTD.  Abdominal wound cultures - + Pseudomonas  Persistent cough COVID and RIP were negative.    Respiratory cx pending collection, though cough improved, unable to produce sputum  Tachycardic this afternoon.  Blood pressure stable. Fluids given  Complaining of non-specific abdominal discomfort, nausea. Reports liquid diarrhea X 4 times today      Review of Systems   Constitutional: Positive for activity change, appetite change (poor appetite) and fever. Negative for chills, diaphoresis and fatigue.   HENT: Negative for congestion, rhinorrhea,  sore throat and trouble swallowing.    Eyes: Negative.    Respiratory: Positive for cough (improved). Negative for shortness of breath.    Cardiovascular: Negative for chest pain and leg swelling.   Gastrointestinal: Positive for abdominal pain (mild, non-specific), diarrhea (per pt X 4, liquid) and nausea. Negative for vomiting.   Genitourinary: Negative for difficulty urinating, dysuria, flank pain and hematuria.   Musculoskeletal: Negative for arthralgias, back pain, joint swelling, myalgias and neck pain.   Skin: Positive for wound (open wound epigastric area). Negative for rash.   Neurological: Negative for dizziness, weakness and headaches.   Psychiatric/Behavioral: Negative for agitation. The patient is not nervous/anxious.      Objective:     Vital Signs (Most Recent):  Temp: 99.1 °F (37.3 °C) (01/06/22 0445)  Pulse: 99 (01/06/22 0445)  Resp: 17 (01/06/22 0445)  BP: (!) 74/0 (01/06/22 0445)  SpO2: 95 % (01/06/22 0445) Vital Signs (24h Range):  Temp:  [98.3 °F (36.8 °C)-100.5 °F (38.1 °C)] 99.1 °F (37.3 °C)  Pulse:  [] 99  Resp:  [17-20] 17  SpO2:  [93 %-100 %] 95 %  BP: (74-84)/(0-57) 74/0     Weight: 89.5 kg (197 lb 5 oz)  Body mass index is 30.9 kg/m².    Estimated Creatinine Clearance: 72.5 mL/min (based on SCr of 1.2 mg/dL).    Physical Exam  Vitals and nursing note reviewed.   Constitutional:       General: He is not in acute distress.     Appearance: He is well-developed. He is obese. He is ill-appearing. He is not toxic-appearing or diaphoretic.   HENT:      Head: Normocephalic and atraumatic.   Eyes:      General: No scleral icterus.     Conjunctiva/sclera: Conjunctivae normal.   Cardiovascular:      Rate and Rhythm: Tachycardia present.      Comments: VAD hum  Pulmonary:      Effort: Pulmonary effort is normal. No respiratory distress.      Breath sounds: No wheezing or rales.   Abdominal:      General: There is distension.      Palpations: Abdomen is soft.      Tenderness: There is abdominal  tenderness (mild tenderness.  Unable to localize). There is no guarding.      Comments: Epigastric wound undressed - no active drainage.  No tenderness, erythema.  Surrounding induration.      DLES site dressed - c/d/i.  No tenderness along driveline    Photos of 1/4 reviewed. See below   Musculoskeletal:         General: No swelling or tenderness. Normal range of motion.      Cervical back: Normal range of motion.      Right lower leg: No edema.      Left lower leg: No edema.   Skin:     General: Skin is warm and dry.      Findings: No erythema or rash.   Neurological:      Mental Status: He is alert and oriented to person, place, and time.   Psychiatric:         Behavior: Behavior normal.                 Significant Labs: All pertinent labs within the past 24 hours have been reviewed.    Significant Imaging: I have reviewed all pertinent imaging results/findings within the past 24 hours.

## 2022-01-07 NOTE — CONSULTS
Wound care consulted for   Would like to find a product that can be used to protect skin and tape on top of instead of skin prep.  Calmoseptine or calamine maybe?  itching under tape around LVAD DLES     Recommend using cavilon barrier film spray on the skin then dressing/tape over it.    Anti- itch medication as needed.

## 2022-01-07 NOTE — PROGRESS NOTES
01/07/2022  Miko Rivera    Current provider:  Zulma Floyd MD    TXP LVAD INTERROGATIONS 1/7/2022 1/7/2022 1/6/2022 1/6/2022 1/6/2022 1/6/2022 1/5/2022   Type HeartMate3 HeartMate3 HeartMate3 HeartMate3 HeartMate3 HeartMate3 HeartMate3   Flow 4.7 4.7 4.6 4.8 4.7 4.9 4.7   Speed 5300 5300 5300 5300 5300 5300 5300   PI 2.8 2.9 3.0 3.1 3.1 3.1 3.2   Power (Centeno) 3.9 3.8 3.8 3.8 3.8 3.8 3.9   LSL 4900 4900 4900 4900 4900 4900 4900   Pulsatility - Intermittent pulse Intermittent pulse Intermittent pulse Intermittent pulse Intermittent pulse Intermittent pulse          Rounded on Saba A Oren to ensure all mechanical assist device settings (IABP or VAD) were appropriate and all parameters were within limits.  I was able to ensure all back up equipment was present, the staff had no issues, and the Perfusion Department daily rounding was complete.    In emergency, the nursing units have been notified to contact the perfusion department either by:  Calling [phone] from 630am to 4pm Mon thru Fri, or by contacting the hospital  from 4pm to 630am and on weekends and asking to speak with the perfusionist on call.    11:52 AM

## 2022-01-07 NOTE — ASSESSMENT & PLAN NOTE
56-year-old male with history of DCM s/p LVAD 2/6/2020, left occipital IPH, lead endocarditis s/p removal, history of VISA and polymicrobial  bacteremia in October (pseudomonas, e coli, proteus.  Strongy negative) on chronic suppressive doxy, presents from clinic with fevers.    Blood cultures of 1/1 - 2/4 bottles + MRSA  Blood cultures 1/2 - 3/4 positive Staph Aureus  Blood cultures  1/3, 1/4  - NGTD   Abdominal wound cx - + Pseudomonas     CT A/P 1/2/22 - unchanged small region of soft tissue induration and slight skin thickening about the drive line in anterior abdominal wall. No definite focal fluid collection about the LVAD. No definite new regions of soft tissue induration or focal fluid collections are identified about the remaining subcutaneous course of the LVAD driveline  Noted patchy LLL GGO and airspace opacities with additional patchy ground glass attenuation in DEVORAH  Slight non-specific perinephric fat stranding - U/A wnl     Still with persistent fever and leukocytosis.   Denies rigors or sweats. Cough improved. Non-productive, unable to produce sputum for cx. Repeat CXR 1/5 unchanged.  COVID negative, RIP panel negative.  Feels poorly this afternoon.  Tachycardic, b/p stable.  Orthostatics negative.  Complaining of non-specific abdominal discomfort, nausea. Reports liquid diarrhea X 4 times today.       Plan/recommendations:  1.   Continue IV daptomycin 10 mg/kg for now MRSA bacteremia. Confirmed susceptibility with Micro lab.  KALI <.5.   Weekly CK while on dapto  2.   Discontinue IV meropenem, and start IV zosyn for pseudomonas from epigastric abdominal wound   3.   Stool for c dif ordered if persistent diarrhea   4.   Repeat blood cultures ordered  5.   Will follow       Data reviewed and plan discussed with ID staff, Dr. Kim  Secure chat with Primary Team, Priti Lugo NP

## 2022-01-07 NOTE — PLAN OF CARE
Problem: Infection  Goal: Absence of Infection Signs and Symptoms  Outcome: Ongoing, Progressing     Problem: Impaired Wound Healing  Goal: Optimal Wound Healing  Outcome: Ongoing, Progressing     Problem: Adjustment to Device (Ventricular Assist Device)  Goal: Optimal Adjustment to Device  Outcome: Ongoing, Progressing     Problem: Bleeding (Ventricular Assist Device)  Goal: Absence of Bleeding  Outcome: Ongoing, Progressing     Problem: Embolism (Ventricular Assist Device)  Goal: Absence of Embolism Signs and Symptoms  Outcome: Ongoing, Progressing     Problem: Hemodynamic Instability (Ventricular Assist Device)  Goal: Optimal Blood Flow  Outcome: Ongoing, Progressing     Problem: Infection (Ventricular Assist Device)  Goal: Absence of Infection Signs and Symptoms  Outcome: Ongoing, Progressing     Problem: Right-Sided Heart Compromise (Ventricular Assist Device)  Goal: Effective Right-Sided Heart Function  Outcome: Ongoing, Progressing     Problem: Adult Inpatient Plan of Care  Goal: Plan of Care Review  Outcome: Ongoing, Progressing  Flowsheets (Taken 1/7/2022 0210)  Plan of Care Reviewed With: patient  Goal: Patient-Specific Goal (Individualized)  Outcome: Ongoing, Progressing  Flowsheets (Taken 1/7/2022 0210)  Anxieties, Fears or Concerns: Heart rate  Individualized Care Needs: Monitor V/S, especially the HR, LVAD numbers,  Patient-Specific Goals (Include Timeframe): Free of fall and injury during theiftand prior to discharge.  Goal: Absence of Hospital-Acquired Illness or Injury  Outcome: Ongoing, Progressing  Goal: Optimal Comfort and Wellbeing  Outcome: Ongoing, Progressing  Goal: Readiness for Transition of Care  Outcome: Ongoing, Progressing     Problem: Fall Injury Risk  Goal: Absence of Fall and Fall-Related Injury  Outcome: Ongoing, Progressing     Problem: Adjustment to Illness (Sepsis/Septic Shock)  Goal: Optimal Coping  Outcome: Ongoing, Progressing     Problem: Bleeding (Sepsis/Septic  Shock)  Goal: Absence of Bleeding  Outcome: Ongoing, Progressing     Problem: Glycemic Control Impaired (Sepsis/Septic Shock)  Goal: Blood Glucose Level Within Desired Range  Outcome: Ongoing, Progressing     Problem: Infection Progression (Sepsis/Septic Shock)  Goal: Absence of Infection Signs and Symptoms  Outcome: Ongoing, Progressing     Problem: Nutrition Impaired (Sepsis/Septic Shock)  Goal: Optimal Nutrition Intake  Outcome: Ongoing, Progressing     Problem: Coping Ineffective  Goal: Effective Coping  Outcome: Ongoing, Progressing

## 2022-01-07 NOTE — CARE UPDATE
"RAPID RESPONSE NURSE CHART REVIEW        Chart Reviewed: 01/07/2022, 4:31 AM    MRN: [id number]  Bed: 302/302 A    Dx: Fever    Saba Lott has a past medical history of Acute decompensated heart failure, Encounter for blood transfusion, Left ventricular assist device complication, LVAD (left ventricular assist device) present, and Presence of left ventricular assist device (LVAD).    Last VS: /60   Pulse (!) 141   Temp 98.9 °F (37.2 °C)   Resp 18   Ht 5' 7.01" (1.702 m)   Wt 89.5 kg (197 lb 5 oz)   SpO2 96%   BMI 30.90 kg/m²     24H Vital Sign Range:  Temp:  [97.8 °F (36.6 °C)-101.5 °F (38.6 °C)]   Pulse:  []   Resp:  [16-18]   BP: ()/(0-60)   SpO2:  [91 %-99 %]     Level of Consciousness (AVPU): alert    Recent Labs     01/05/22  0507 01/06/22  0459   WBC 22.11* 21.74*   HGB 8.0* 7.7*   HCT 28.2* 27.4*   * 541*       Recent Labs     01/05/22  0507 01/06/22  0459   * 136   K 4.1 4.5   CL 97 100   CO2 27 25   CREATININE 1.2 1.1    96   MG 2.0 2.0        No results for input(s): PH, PCO2, PO2, HCO3, POCSATURATED, BE in the last 72 hours.     OXYGEN:  Flow (L/min): 2     O2 Device (Oxygen Therapy): room air    MEWS score: 4    Bedside RNLorena  contacted. No concerns verbalized at this time. Patient tachycardic HR 130s-140s, primary team aware. Instructed to call [phone] for further concerns or assistance.    Kathie Malik RN        "

## 2022-01-07 NOTE — ASSESSMENT & PLAN NOTE
- K 4.8  - Aldactone was increased to 50 mg qd 1/1 for hypokalemia. However Cr above baseline at 1.4, decreased back to 25 mg qd 1/3 and then discontinued 1/4 with improvement in kidney function

## 2022-01-07 NOTE — SUBJECTIVE & OBJECTIVE
Interval History: Pt reports general weakness. TMax 100.8 F. Last positive BCx 1/2 for Staph, all repeats NGTD. Abdominal wound culture 1/3 positive for pseudomonas. ID following, changed meropenem to Zosyn, continue Dapto. INR 1.5 after vitamin K yesterday.     Continuous Infusions:  Scheduled Meds:   albuterol-ipratropium  3 mL Nebulization Q4H    atorvastatin  20 mg Oral QHS    DAPTOmycin (CUBICIN)  IV  10 mg/kg Intravenous Q24H    docusate sodium  100 mg Oral Daily    Lactobacillus rhamnosus GG  1 capsule Oral Daily    magnesium oxide  400 mg Oral BID    mupirocin   Nasal BID    piperacillin-tazobactam (ZOSYN) IVPB  4.5 g Intravenous Q8H     PRN Meds:acetaminophen, benzonatate, guaiFENesin 100 mg/5 ml, ondansetron    Review of patient's allergies indicates:   Allergen Reactions    Iodine and iodide containing products      Objective:     Vital Signs (Most Recent):  Temp: (!) 100.8 °F (38.2 °C) (01/07/22 0739)  Pulse: (!) 120 (01/07/22 0843)  Resp: 18 (01/07/22 0843)  BP: (!) 80/0 (01/07/22 0739)  SpO2: 95 % (01/07/22 0843) Vital Signs (24h Range):  Temp:  [97.8 °F (36.6 °C)-101.5 °F (38.6 °C)] 100.8 °F (38.2 °C)  Pulse:  [] 120  Resp:  [16-18] 18  SpO2:  [91 %-96 %] 95 %  BP: ()/(0-60) 80/0     Patient Vitals for the past 72 hrs (Last 3 readings):   Weight   01/05/22 0800 89.5 kg (197 lb 5 oz)     Body mass index is 30.9 kg/m².      Intake/Output Summary (Last 24 hours) at 1/7/2022 1046  Last data filed at 1/7/2022 0931  Gross per 24 hour   Intake 1336.97 ml   Output 875 ml   Net 461.97 ml       Hemodynamic Parameters:       Telemetry: ST with PVC's    Physical Exam  Constitutional:       Appearance: Normal appearance.   HENT:      Head: Normocephalic and atraumatic.   Eyes:      Conjunctiva/sclera: Conjunctivae normal.   Neck:      Comments: Neck veins are not elevated  Cardiovascular:      Rate and Rhythm: Regular rhythm. Tachycardia present.      Comments: Smooth VAD hum  Pulmonary:       Effort: Pulmonary effort is normal.      Breath sounds: Normal breath sounds.   Abdominal:      General: Bowel sounds are normal.      Palpations: Abdomen is soft.   Musculoskeletal:         General: No swelling. Normal range of motion.      Cervical back: Normal range of motion and neck supple.   Skin:     General: Skin is warm and dry.      Capillary Refill: Capillary refill takes 2 to 3 seconds.   Neurological:      General: No focal deficit present.      Mental Status: He is alert and oriented to person, place, and time.   Psychiatric:         Mood and Affect: Mood normal.         Behavior: Behavior normal.         Thought Content: Thought content normal.         Judgment: Judgment normal.         Significant Labs:  CBC:  Recent Labs   Lab 01/05/22  0507 01/06/22  0459 01/07/22  0604   WBC 22.11* 21.74* 16.22*   RBC 4.60 4.37* 4.59*   HGB 8.0* 7.7* 8.2*   HCT 28.2* 27.4* 27.9*   * 541* 572*   MCV 61* 63* 61*   MCH 17.4* 17.6* 17.9*   MCHC 28.4* 28.1* 29.4*     BNP:  Recent Labs   Lab 01/03/22  0517 01/05/22  0507 01/07/22  0604   * 381* 1,483*     CMP:  Recent Labs   Lab 01/03/22  0517 01/03/22  0518 01/05/22  0507 01/06/22  0459 01/07/22  0604   GLU  --    < > 101 96 134*   CALCIUM  --    < > 8.3* 8.2* 8.4*   ALBUMIN 2.4*  --  2.3*  --  2.3*   PROT 6.7  --  6.6  --  7.5   NA  --    < > 131* 136 131*   K  --    < > 4.1 4.5 4.8   CO2  --    < > 27 25 22*   CL  --    < > 97 100 100   BUN  --    < > 11 10 8   CREATININE  --    < > 1.2 1.1 1.3   ALKPHOS 89  --  81  --  87   ALT 8*  --  10  --  13   AST 13  --  17  --  20   BILITOT 0.8  --  0.6  --  0.8    < > = values in this interval not displayed.      Coagulation:   Recent Labs   Lab 01/05/22  0507 01/05/22  0507 01/06/22  0459 01/06/22  1436 01/07/22  0604   INR 2.6*   < > 3.0* 2.3* 1.5*   APTT 44.4*  --  35.0*  --  39.1*    < > = values in this interval not displayed.     LDH:  Recent Labs   Lab 01/05/22  0507 01/06/22  0459 01/07/22  0604   LDH  293* 378* 291*     Microbiology:  Microbiology Results (last 7 days)     Procedure Component Value Units Date/Time    Blood culture [[id number]] Collected: 01/04/22 0420    Order Status: Completed Specimen: Blood Updated: 01/07/22 1012     Blood Culture, Routine No Growth to date      No Growth to date      No Growth to date      No Growth to date    Blood culture [[id number]] Collected: 01/04/22 0425    Order Status: Completed Specimen: Blood Updated: 01/07/22 1012     Blood Culture, Routine No Growth to date      No Growth to date      No Growth to date      No Growth to date    Blood culture [[id number]] Collected: 01/02/22 1946    Order Status: Completed Specimen: Blood Updated: 01/07/22 0738     Blood Culture, Routine Gram stain aer bottle: Gram positive cocci in clusters resembling Staph      Positive results previously called    Blood culture [[id number]] Collected: 01/07/22 0604    Order Status: Sent Specimen: Blood Updated: 01/07/22 0622    Blood culture [[id number]] Collected: 01/07/22 0604    Order Status: Sent Specimen: Blood Updated: 01/07/22 0622    Blood culture [[id number]] Collected: 01/03/22 1723    Order Status: Completed Specimen: Blood Updated: 01/06/22 2012     Blood Culture, Routine No Growth to date      No Growth to date      No Growth to date      No Growth to date    Blood culture [[id number]] Collected: 01/03/22 1723    Order Status: Completed Specimen: Blood Updated: 01/06/22 2012     Blood Culture, Routine No Growth to date      No Growth to date      No Growth to date      No Growth to date    Clostridium difficile EIA [[id number]]     Order Status: No result Specimen: Stool     Blood culture [[id number]]  (Abnormal) Collected: 01/02/22 1946    Order Status: Completed Specimen: Blood Updated: 01/06/22 1319     Blood Culture, Routine Gram stain uzma bottle: Gram positive cocci in clusters resembling Staph      Results called to and read back by:Khai Durand RN 01/04/2022  22:54      Gram stain aer  bottle: Gram positive cocci       Positive results previously called  01/05/2022      STAPHYLOCOCCUS AUREUS  ID consult required at [facility].  For susceptibility see order #[id number]      Blood culture [[id number]]  (Abnormal)  (Susceptibility) Collected: 01/01/22 0020    Order Status: Completed Specimen: Blood Updated: 01/06/22 1141     Blood Culture, Routine Gram stain aer bottle: Gram positive cocci       Results called to and read back by: DANIEL BLANCHARD RN  01/02/2022  22:36      METHICILLIN RESISTANT STAPHYLOCOCCUS AUREUS  ID consult required at [facility].       Comment: Previous comment was modified by RHONA MELGOZA at 10:51 on 01/06/2022 ID   consult required at [facility].ID   consult required at [facility].         Aerobic culture [[id number]]  (Abnormal)  (Susceptibility) Collected: 01/03/22 1822    Order Status: Completed Specimen: Wound from Abdomen Updated: 01/06/22 1126     Aerobic Bacterial Culture PSEUDOMONAS AERUGINOSA  Few      Blood culture [[id number]]  (Abnormal)  (Susceptibility) Collected: 01/01/22 0020    Order Status: Completed Specimen: Blood Updated: 01/06/22 1052     Blood Culture, Routine Gram stain aer bottle: Gram positive cocci in clusters resembling Staph       Results called to and read back by:DANIEL BLANCHARD 01/02/2022  23:29      METHICILLIN RESISTANT STAPHYLOCOCCUS AUREUS  ID consult required at [facility].  ID consult required at [facility].      Culture, Anaerobe [[id number]] Collected: 01/03/22 1822    Order Status: Completed Specimen: Wound from Abdomen Updated: 01/06/22 0847     Anaerobic Culture Culture in progress    Culture, Respiratory with Gram Stain [[id number]]     Order Status: No result Specimen: Respiratory from Sputum, Expectorated     Respiratory Infection Panel (PCR), Nasopharyngeal  [[id number]] Collected: 01/03/22 1822    Order Status: Completed Specimen: Nasopharyngeal Swab Updated: 01/03/22 2107     Respiratory Infection Panel Source NP Swab     Adenovirus Not Detected     Coronavirus 229E, Common Cold Virus Not Detected     Coronavirus HKU1, Common Cold Virus Not Detected     Coronavirus NL63, Common Cold Virus Not Detected     Coronavirus OC43, Common Cold Virus Not Detected     Comment: The Coronavirus strains detected in this test cause the common cold.  These strains are not the COVID-19 (novel Coronavirus)strain   associated with the respiratory disease outbreak.          Human Metapneumovirus Not Detected     Human Rhinovirus/Enterovirus Not Detected     Influenza A (subtypes H1, H1-2009,H3) Not Detected     Influenza B Not Detected     Parainfluenza Virus 1 Not Detected     Parainfluenza Virus 2 Not Detected     Parainfluenza Virus 3 Not Detected     Parainfluenza Virus 4 Not Detected     Respiratory Syncytial Virus Not Detected     Bordetella Parapertussis (LX8731) Not Detected     Bordetella pertussis (ptxP) Not Detected     Chlamydia pneumoniae Not Detected     Mycoplasma pneumoniae Not Detected    Narrative:      For all other respiratory sources, order [id number] -  Respiratory Viral Panel by PCR          I have reviewed all pertinent labs within the past 24 hours.    Estimated Creatinine Clearance: 66.9 mL/min (based on SCr of 1.3 mg/dL).    Diagnostic Results:  I have reviewed all pertinent imaging results/findings within the past 24 hours.

## 2022-01-07 NOTE — NURSING
Called [facility], spoke with Brittani Cervantes, reported that patient's HR still in 130s.  Order to continue to monitor the HR.

## 2022-01-07 NOTE — PROGRESS NOTES
[facility] - Cardiology Stepdown  Heart Transplant  Progress Note    Patient Name: Saba Lott  MRN: [id number]  Admission Date: 12/31/2021  Hospital Length of Stay: 7 days  Attending Physician: Zulma Floyd MD  Primary Care Provider: Mary oLmbardi MD  Principal Problem:Fever    Subjective:     Interval History: Pt reports general weakness. TMax 100.8 F. Last positive BCx 1/2 for Staph, all repeats NGTD. Abdominal wound culture 1/3 positive for pseudomonas. ID following, changed meropenem to Zosyn, continue Dapto. INR 1.5 after vitamin K yesterday.     Continuous Infusions:  Scheduled Meds:   albuterol-ipratropium  3 mL Nebulization Q4H    atorvastatin  20 mg Oral QHS    DAPTOmycin (CUBICIN)  IV  10 mg/kg Intravenous Q24H    docusate sodium  100 mg Oral Daily    Lactobacillus rhamnosus GG  1 capsule Oral Daily    magnesium oxide  400 mg Oral BID    mupirocin   Nasal BID    piperacillin-tazobactam (ZOSYN) IVPB  4.5 g Intravenous Q8H     PRN Meds:acetaminophen, benzonatate, guaiFENesin 100 mg/5 ml, ondansetron    Review of patient's allergies indicates:   Allergen Reactions    Iodine and iodide containing products      Objective:     Vital Signs (Most Recent):  Temp: (!) 100.8 °F (38.2 °C) (01/07/22 0739)  Pulse: (!) 120 (01/07/22 0843)  Resp: 18 (01/07/22 0843)  BP: (!) 80/0 (01/07/22 0739)  SpO2: 95 % (01/07/22 0843) Vital Signs (24h Range):  Temp:  [97.8 °F (36.6 °C)-101.5 °F (38.6 °C)] 100.8 °F (38.2 °C)  Pulse:  [] 120  Resp:  [16-18] 18  SpO2:  [91 %-96 %] 95 %  BP: ()/(0-60) 80/0     Patient Vitals for the past 72 hrs (Last 3 readings):   Weight   01/05/22 0800 89.5 kg (197 lb 5 oz)     Body mass index is 30.9 kg/m².      Intake/Output Summary (Last 24 hours) at 1/7/2022 1046  Last data filed at 1/7/2022 0931  Gross per 24 hour   Intake 1336.97 ml   Output 875 ml   Net 461.97 ml       Hemodynamic Parameters:       Telemetry: ST with PVC's    Physical Exam  Constitutional:        Appearance: Normal appearance.   HENT:      Head: Normocephalic and atraumatic.   Eyes:      Conjunctiva/sclera: Conjunctivae normal.   Neck:      Comments: Neck veins are not elevated  Cardiovascular:      Rate and Rhythm: Regular rhythm. Tachycardia present.      Comments: Smooth VAD hum  Pulmonary:      Effort: Pulmonary effort is normal.      Breath sounds: Normal breath sounds.   Abdominal:      General: Bowel sounds are normal.      Palpations: Abdomen is soft.   Musculoskeletal:         General: No swelling. Normal range of motion.      Cervical back: Normal range of motion and neck supple.   Skin:     General: Skin is warm and dry.      Capillary Refill: Capillary refill takes 2 to 3 seconds.   Neurological:      General: No focal deficit present.      Mental Status: He is alert and oriented to person, place, and time.   Psychiatric:         Mood and Affect: Mood normal.         Behavior: Behavior normal.         Thought Content: Thought content normal.         Judgment: Judgment normal.         Significant Labs:  CBC:  Recent Labs   Lab 01/05/22  0507 01/06/22  0459 01/07/22  0604   WBC 22.11* 21.74* 16.22*   RBC 4.60 4.37* 4.59*   HGB 8.0* 7.7* 8.2*   HCT 28.2* 27.4* 27.9*   * 541* 572*   MCV 61* 63* 61*   MCH 17.4* 17.6* 17.9*   MCHC 28.4* 28.1* 29.4*     BNP:  Recent Labs   Lab 01/03/22  0517 01/05/22  0507 01/07/22  0604   * 381* 1,483*     CMP:  Recent Labs   Lab 01/03/22  0517 01/03/22  0518 01/05/22  0507 01/06/22  0459 01/07/22  0604   GLU  --    < > 101 96 134*   CALCIUM  --    < > 8.3* 8.2* 8.4*   ALBUMIN 2.4*  --  2.3*  --  2.3*   PROT 6.7  --  6.6  --  7.5   NA  --    < > 131* 136 131*   K  --    < > 4.1 4.5 4.8   CO2  --    < > 27 25 22*   CL  --    < > 97 100 100   BUN  --    < > 11 10 8   CREATININE  --    < > 1.2 1.1 1.3   ALKPHOS 89  --  81  --  87   ALT 8*  --  10  --  13   AST 13  --  17  --  20   BILITOT 0.8  --  0.6  --  0.8    < > = values in this interval not displayed.       Coagulation:   Recent Labs   Lab 01/05/22  0507 01/05/22  0507 01/06/22  0459 01/06/22  1436 01/07/22  0604   INR 2.6*   < > 3.0* 2.3* 1.5*   APTT 44.4*  --  35.0*  --  39.1*    < > = values in this interval not displayed.     LDH:  Recent Labs   Lab 01/05/22  0507 01/06/22  0459 01/07/22  0604   * 378* 291*     Microbiology:  Microbiology Results (last 7 days)     Procedure Component Value Units Date/Time    Blood culture [[id number]] Collected: 01/04/22 0420    Order Status: Completed Specimen: Blood Updated: 01/07/22 1012     Blood Culture, Routine No Growth to date      No Growth to date      No Growth to date      No Growth to date    Blood culture [[id number]] Collected: 01/04/22 0425    Order Status: Completed Specimen: Blood Updated: 01/07/22 1012     Blood Culture, Routine No Growth to date      No Growth to date      No Growth to date      No Growth to date    Blood culture [[id number]] Collected: 01/02/22 1946    Order Status: Completed Specimen: Blood Updated: 01/07/22 0738     Blood Culture, Routine Gram stain aer bottle: Gram positive cocci in clusters resembling Staph      Positive results previously called    Blood culture [[id number]] Collected: 01/07/22 0604    Order Status: Sent Specimen: Blood Updated: 01/07/22 0622    Blood culture [[id number]] Collected: 01/07/22 0604    Order Status: Sent Specimen: Blood Updated: 01/07/22 0622    Blood culture [[id number]] Collected: 01/03/22 1723    Order Status: Completed Specimen: Blood Updated: 01/06/22 2012     Blood Culture, Routine No Growth to date      No Growth to date      No Growth to date      No Growth to date    Blood culture [[id number]] Collected: 01/03/22 1723    Order Status: Completed Specimen: Blood Updated: 01/06/22 2012     Blood Culture, Routine No Growth to date      No Growth to date      No Growth to date      No Growth to date    Clostridium difficile EIA [[id number]]     Order Status: No result Specimen: Stool     Blood culture  [[id number]]  (Abnormal) Collected: 01/02/22 1946    Order Status: Completed Specimen: Blood Updated: 01/06/22 1319     Blood Culture, Routine Gram stain uzma bottle: Gram positive cocci in clusters resembling Staph      Results called to and read back by:Khai Durand RN 01/04/2022  22:54      Gram stain aer bottle: Gram positive cocci       Positive results previously called  01/05/2022      STAPHYLOCOCCUS AUREUS  ID consult required at [facility].  For susceptibility see order #[id number]      Blood culture [[id number]]  (Abnormal)  (Susceptibility) Collected: 01/01/22 0020    Order Status: Completed Specimen: Blood Updated: 01/06/22 1141     Blood Culture, Routine Gram stain aer bottle: Gram positive cocci       Results called to and read back by: DANIEL BLANCHARD RN  01/02/2022  22:36      METHICILLIN RESISTANT STAPHYLOCOCCUS AUREUS  ID consult required at [facility].       Comment: Previous comment was modified by RHONA MELGOZA at 10:51 on 01/06/2022 ID   consult required at [facility].ID   consult required at [facility].         Aerobic culture [[id number]]  (Abnormal)  (Susceptibility) Collected: 01/03/22 1822    Order Status: Completed Specimen: Wound from Abdomen Updated: 01/06/22 1126     Aerobic Bacterial Culture PSEUDOMONAS AERUGINOSA  Few      Blood culture [[id number]]  (Abnormal)  (Susceptibility) Collected: 01/01/22 0020    Order Status: Completed Specimen: Blood Updated: 01/06/22 1052     Blood Culture, Routine Gram stain aer bottle: Gram positive cocci in clusters resembling Staph       Results called to and read back by:DANIEL BLANCHARD 01/02/2022  23:29      METHICILLIN RESISTANT STAPHYLOCOCCUS AUREUS  ID consult required at [facility].  ID consult required at [facility].      Culture, Anaerobe [[id number]] Collected: 01/03/22 1822     Order Status: Completed Specimen: Wound from Abdomen Updated: 01/06/22 0847     Anaerobic Culture Culture in progress    Culture, Respiratory with Gram Stain [[id number]]     Order Status: No result Specimen: Respiratory from Sputum, Expectorated     Respiratory Infection Panel (PCR), Nasopharyngeal [[id number]] Collected: 01/03/22 1822    Order Status: Completed Specimen: Nasopharyngeal Swab Updated: 01/03/22 2107     Respiratory Infection Panel Source NP Swab     Adenovirus Not Detected     Coronavirus 229E, Common Cold Virus Not Detected     Coronavirus HKU1, Common Cold Virus Not Detected     Coronavirus NL63, Common Cold Virus Not Detected     Coronavirus OC43, Common Cold Virus Not Detected     Comment: The Coronavirus strains detected in this test cause the common cold.  These strains are not the COVID-19 (novel Coronavirus)strain   associated with the respiratory disease outbreak.          Human Metapneumovirus Not Detected     Human Rhinovirus/Enterovirus Not Detected     Influenza A (subtypes H1, H1-2009,H3) Not Detected     Influenza B Not Detected     Parainfluenza Virus 1 Not Detected     Parainfluenza Virus 2 Not Detected     Parainfluenza Virus 3 Not Detected     Parainfluenza Virus 4 Not Detected     Respiratory Syncytial Virus Not Detected     Bordetella Parapertussis (BL3797) Not Detected     Bordetella pertussis (ptxP) Not Detected     Chlamydia pneumoniae Not Detected     Mycoplasma pneumoniae Not Detected    Narrative:      For all other respiratory sources, order [id number] -  Respiratory Viral Panel by PCR          I have reviewed all pertinent labs within the past 24 hours.    Estimated Creatinine Clearance: 66.9 mL/min (based on SCr of 1.3 mg/dL).    Diagnostic Results:  I have reviewed all pertinent imaging results/findings within the past 24 hours.    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 Patient is short of breath and uncomfortable at the time of giving history. He reports chills, weakness, cough, sputum production, weight gain, nausea, vomiting and trouble breathing. He went to his PCP office yesterday where his temp was 103 and was sent to the hospital afterwards. His white count is 20 on arrival. He is unable to lay flat. His HR is 102-142 sinus. He needs to be diuresed upon arrival but K is 2.9, ordered IV and PO Kcl, he vomited out oral potassium, IV replacement will be done.       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 On admit presented with cough, nausea, vomiting, weakness, and temp of 103 F  - Admits to noncompliance with suppressive Doxy PTA  - CT c/a/p on admit with left basilar ground-glass and airspace opacities with additional slight ground-glass attenuation in the left upper lobe.    - Blood cultures 1/1 & 1/2 positive for Staph, repeats NGTD  - Abdominal culture 1/3 positive for pseudomonas  - Remains febrile, TMax 100.8 F  - ID following, vanc changed to dapto 1/1 given hx of VISA. Cefepime changed to jacoby 1/2 and then jacoby changed to Zosyn 1/6  - CT c/a/p on admit with left basilar ground-glass and airspace opacities with additional slight ground-glass attenuation in the left upper lobe.    - Cough non productive, less frequent. Continue Duonebs            Sepsis  -See fever    Hypokalemia  - K 4.8  - Aldactone was increased to 50 mg qd 1/1 for hypokalemia. However Cr above baseline at 1.4, decreased back to 25 mg qd 1/3 and then discontinued 1/4 with improvement in kidney function    LVAD (left ventricular assist device) present  -S/P DT HM3 with MVR 2/2/20  -Current speed  5300  -INR goal 1.5-2.0 (hx of ICH). INR 1.5 today after vitamin K yesterday (INR 3.0 despite holding coumadin)  -LDH stable  -ECHO 10/9/21 with EF 20%, BRYAN, IVS bows into RV, G1DD, s/p Alferi stitch, mild-moderate RVSF, CVP 3, LVEDD 6.43 with LVAD speed set to 5300 rpm        Procedure: Device Interrogation Including analysis of device parameters  Current Settings: Ventricular Assist Device  Review of device function is stable      TXP LVAD INTERROGATIONS 1/7/2022 1/7/2022 1/6/2022 1/6/2022 1/6/2022 1/6/2022 1/5/2022   Type HeartMate3 HeartMate3 HeartMate3 HeartMate3 HeartMate3 HeartMate3 HeartMate3   Flow 4.7 4.7 4.6 4.8 4.7 4.9 4.7   Speed 5300 5300 5300 5300 5300 5300 5300   PI 2.8 2.9 3.0 3.1 3.1 3.1 3.2   Power (Centeno) 3.9 3.8 3.8 3.8 3.8 3.8 3.9   LSL 4900 4900 4900 4900 4900 4900 4900   Pulsatility - Intermittent pulse Intermittent pulse Intermittent pulse Intermittent pulse Intermittent pulse Intermittent pulse           Melissa Olson PA-C  Heart Transplant  [facility] - Cardiology Stepdown

## 2022-01-07 NOTE — SUBJECTIVE & OBJECTIVE
Interval History: pt reports feeling much better today than yesterday.     Review of Systems   Constitutional: Positive for activity change, appetite change (poor appetite) and fever. Negative for chills, diaphoresis and fatigue.   Respiratory: Positive for cough (improved). Negative for shortness of breath.    Cardiovascular: Negative for chest pain and leg swelling.   Gastrointestinal: Negative for abdominal pain, diarrhea, nausea and vomiting.   Genitourinary: Negative for difficulty urinating, dysuria, flank pain and hematuria.   Musculoskeletal: Negative for arthralgias, back pain, joint swelling, myalgias and neck pain.   Skin: Positive for wound (open wound epigastric area). Negative for rash.   Neurological: Negative for dizziness, weakness and headaches.   Psychiatric/Behavioral: Negative for agitation. The patient is not nervous/anxious.      Objective:     Vital Signs (Most Recent):  Temp: 98.9 °F (37.2 °C) (01/07/22 1137)  Pulse: (!) 114 (01/07/22 1137)  Resp: 18 (01/07/22 0843)  BP: (!) 70/0 (01/07/22 1137)  SpO2: (!) 93 % (01/07/22 1137) Vital Signs (24h Range):  Temp:  [97.8 °F (36.6 °C)-100.8 °F (38.2 °C)] 98.9 °F (37.2 °C)  Pulse:  [] 114  Resp:  [16-18] 18  SpO2:  [91 %-96 %] 93 %  BP: ()/(0-60) 70/0     Weight: 89.5 kg (197 lb 5 oz)  Body mass index is 30.9 kg/m².    Estimated Creatinine Clearance: 66.9 mL/min (based on SCr of 1.3 mg/dL).    Physical Exam  Vitals and nursing note reviewed.   Constitutional:       General: He is not in acute distress.     Appearance: He is well-developed. He is obese. He is ill-appearing. He is not toxic-appearing or diaphoretic.   Cardiovascular:      Rate and Rhythm: Tachycardia present.      Comments: VAD hum  Pulmonary:      Effort: Pulmonary effort is normal. No respiratory distress.      Breath sounds: No wheezing or rales.   Abdominal:      General: There is no distension.      Palpations: Abdomen is soft.      Tenderness: There is no abdominal  tenderness. There is no guarding.      Comments: Epigastric wound undressed - no active drainage.  No tenderness, erythema.  Surrounding induration.      DLES site not dressed; no drainage. No erythema   Musculoskeletal:         General: No swelling or tenderness. Normal range of motion.      Cervical back: Normal range of motion.   Skin:     General: Skin is warm and dry.      Findings: No erythema or rash.   Neurological:      Mental Status: He is alert and oriented to person, place, and time.   Psychiatric:         Behavior: Behavior normal.         Significant Labs:   Blood Culture:   Recent Labs   Lab 01/01/22  0020 01/02/22  1946 01/03/22  1723 01/04/22  0420 01/04/22  0425   LABBLOO Gram stain aer bottle: Gram positive cocci   Results called to and read back by: DANIEL BLANCHARD RN  01/02/2022  22:36  METHICILLIN RESISTANT STAPHYLOCOCCUS AUREUS  ID consult required at [facility].  *  Gram stain aer bottle: Gram positive cocci in clusters resembling Staph   Results called to and read back by:DANIEL BLANCHARD 01/02/2022  23:29  METHICILLIN RESISTANT STAPHYLOCOCCUS AUREUS  ID consult required at [facility].  ID consult required at [facility].  * Gram stain aer bottle: Gram positive cocci in clusters resembling Staph  Positive results previously called  Gram stain uzma bottle: Gram positive cocci in clusters resembling Staph  Results called to and read back by:Khai Durand RN 01/04/2022  22:54  Gram stain aer bottle: Gram positive cocci   Positive results previously called  01/05/2022  STAPHYLOCOCCUS AUREUS  ID consult required at [facility].  For susceptibility see order #[id number]  * No Growth to date  No Growth to date  No Growth to date  No Growth to date  No Growth to date  No Growth to date  No Growth to date  No Growth to date No Growth to date  No Growth to date  No Growth  to date  No Growth to date No Growth to date  No Growth to date  No Growth to date  No Growth to date     CBC:   Recent Labs   Lab 01/06/22  0459 01/07/22  0604   WBC 21.74* 16.22*   HGB 7.7* 8.2*   HCT 27.4* 27.9*   * 572*     CMP:   Recent Labs   Lab 01/06/22  0459 01/07/22  0604    131*   K 4.5 4.8    100   CO2 25 22*   GLU 96 134*   BUN 10 8   CREATININE 1.1 1.3   CALCIUM 8.2* 8.4*   PROT  --  7.5   ALBUMIN  --  2.3*   BILITOT  --  0.8   ALKPHOS  --  87   AST  --  20   ALT  --  13   ANIONGAP 11 9   EGFRNONAA >60.0 >60.0     Wound Culture:   Recent Labs   Lab 07/23/21  1004 08/09/21  1119 08/09/21  1128 10/05/21  1732 01/03/22  1822   LABAERO METHICILLIN RESISTANT STAPHYLOCOCCUS AUREUS  Moderate  * No growth No growth METHICILLIN RESISTANT STAPHYLOCOCCUS AUREUS  Rare  * PSEUDOMONAS AERUGINOSA  Few  *       Significant Imaging: I have reviewed all pertinent imaging results/findings within the past 24 hours.

## 2022-01-07 NOTE — SUBJECTIVE & OBJECTIVE
Subjective:     Interval History: NAEON. Some diarrhea. Still with temp 101 at times.       Medications:  Continuous Infusions:  Scheduled Meds:   albuterol-ipratropium  3 mL Nebulization Q4H    atorvastatin  20 mg Oral QHS    DAPTOmycin (CUBICIN)  IV  10 mg/kg Intravenous Q24H    docusate sodium  100 mg Oral Daily    Lactobacillus rhamnosus GG  1 capsule Oral Daily    magnesium oxide  400 mg Oral BID    mupirocin   Nasal BID    piperacillin-tazobactam (ZOSYN) IVPB  4.5 g Intravenous Q8H     PRN Meds:acetaminophen, benzonatate, diphenhydrAMINE, guaiFENesin 100 mg/5 ml, ondansetron     Objective:     Vital Signs (Most Recent):  Temp: 98.9 °F (37.2 °C) (01/07/22 1137)  Pulse: (!) 114 (01/07/22 1137)  Resp: 18 (01/07/22 0843)  BP: (!) 70/0 (01/07/22 1137)  SpO2: (!) 93 % (01/07/22 1137) Vital Signs (24h Range):  Temp:  [97.8 °F (36.6 °C)-100.8 °F (38.2 °C)] 98.9 °F (37.2 °C)  Pulse:  [] 114  Resp:  [16-18] 18  SpO2:  [91 %-96 %] 93 %  BP: ()/(0-60) 70/0       Intake/Output Summary (Last 24 hours) at 1/7/2022 1307  Last data filed at 1/7/2022 0931  Gross per 24 hour   Intake 702 ml   Output 775 ml   Net -73 ml       Physical Exam  Vitals reviewed.   Constitutional:       Appearance: He is well-developed.   HENT:      Head: Normocephalic and atraumatic.   Eyes:      Pupils: Pupils are equal, round, and reactive to light.   Cardiovascular:      Rate and Rhythm: Regular rhythm. Tachycardia present.   Pulmonary:      Effort: Pulmonary effort is normal. No respiratory distress.   Musculoskeletal:      Cervical back: Normal range of motion.   Skin:     Coloration: Skin is not pale.   Neurological:      Mental Status: He is alert and oriented to person, place, and time.   Psychiatric:         Mood and Affect: Mood normal.         Behavior: Behavior normal.         Significant Labs:  ABGs: No results for input(s): PH, PCO2, PO2, HCO3, POCSATURATED, BE in the last 48 hours.  Amylase: No results for input(s):  AMYLASE in the last 48 hours.  BMP:   Recent Labs   Lab 01/07/22  0604   *   *   K 4.8      CO2 22*   BUN 8   CREATININE 1.3   CALCIUM 8.4*   MG 1.9     Cardiac markers: No results for input(s): CKMB, CPKMB, TROPONINT, TROPONINI, MYOGLOBIN in the last 48 hours.  CBC:   Recent Labs   Lab 01/07/22  0604   WBC 16.22*   RBC 4.59*   HGB 8.2*   HCT 27.9*   *   MCV 61*   MCH 17.9*   MCHC 29.4*     CMP:   Recent Labs   Lab 01/07/22  0604   *   CALCIUM 8.4*   ALBUMIN 2.3*   PROT 7.5   *   K 4.8   CO2 22*      BUN 8   CREATININE 1.3   ALKPHOS 87   ALT 13   AST 20   BILITOT 0.8     Coagulation:   Recent Labs   Lab 01/07/22  0604   INR 1.5*   APTT 39.1*     Lactic Acid: No results for input(s): LACTATE in the last 48 hours.  LFTs:   Recent Labs   Lab 01/07/22  0604   ALT 13   AST 20   ALKPHOS 87   BILITOT 0.8   PROT 7.5   ALBUMIN 2.3*     Lipase: No results for input(s): LIPASE in the last 48 hours.    Significant Diagnostics:  I have reviewed all pertinent imaging results/findings within the past 24 hours.    Procedure: Device Interrogation Including analysis of device parameters  Current Settings: Ventricular Assist Device  Review of device function is stable  TXP LVAD INTERROGATIONS 1/7/2022 1/7/2022 1/6/2022 1/6/2022 1/6/2022 1/6/2022 1/5/2022   Type HeartMate3 HeartMate3 HeartMate3 HeartMate3 HeartMate3 HeartMate3 HeartMate3   Flow 4.7 4.7 4.6 4.8 4.7 4.9 4.7   Speed 5300 5300 5300 5300 5300 5300 5300   PI 2.8 2.9 3.0 3.1 3.1 3.1 3.2   Power (Centeno) 3.9 3.8 3.8 3.8 3.8 3.8 3.9   LSL 4900 4900 4900 4900 4900 4900 4900   Pulsatility - Intermittent pulse Intermittent pulse Intermittent pulse Intermittent pulse Intermittent pulse Intermittent pulse

## 2022-01-07 NOTE — PROGRESS NOTES
[facility] - Cardiology Stepdown  Infectious Disease  Progress Note    Patient Name: Saba Lott  MRN: [id number]  Admission Date: 12/31/2021  Length of Stay: 7 days  Attending Physician: Zulma Floyd MD  Primary Care Provider: Mary Lombardi MD    Isolation Status: Special Contact  Assessment/Plan:      * Fever  56-year-old male with history of DCM s/p LVAD 2/6/2020, left occipital IPH, lead endocarditis s/p removal, history of VISA and polymicrobial  bacteremia in October (pseudomonas, e coli, proteus.  Strongy negative) on chronic suppressive doxy, presents from clinic with fevers.    Blood cultures of 1/1 - 2/4 bottles + MRSA  Blood cultures 1/2 - 3/4 positive Staph Aureus  Blood cultures  1/3, 1/4  - NGTD   Abdominal wound cx - + Pseudomonas     CT A/P 1/2/22 - unchanged small region of soft tissue induration and slight skin thickening about the drive line in anterior abdominal wall. No definite focal fluid collection about the LVAD. No definite new regions of soft tissue induration or focal fluid collections are identified about the remaining subcutaneous course of the LVAD driveline  Noted patchy LLL GGO and airspace opacities with additional patchy ground glass attenuation in DEVORAH  Slight non-specific perinephric fat stranding - U/A wnl    Leukocytosis improving (21K down to 16K. Tmax 100.8. patient states he feels better today. COVID negative, RIP panel negative.    Plan:  1.   Continue IV daptomycin 10 mg/kg for now MRSA bacteremia. Confirmed susceptibility with Micro lab.  KALI <.5.   Weekly CK while on dapto  2.   Continue IV zosyn for pseudomonas from epigastric abdominal wound   3.   Follow repeat cultures  4.  Continue to monitor fever trend  5.   Will follow      MRSA bacteremia  See assessment/plan above      Thank you for your consult. I will follow-up with patient. Please contact us if you have any additional questions.  CONG De León  Infectious Disease  [facility] - Cardiology  Stepdown    Subjective:     Principal Problem:Fever    HPI: Mr. 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 Pt reports feeling weak since day prior, fevers, chills, diarrhea and vomiting x 1. Denies sore throat, HA, abdominal pain, dysuria, sob or cough. No sick contacts. No increased drainage from DLES. Lives alone.    In the ED pt febrile, tachycardic and with WBC 20. CXR with b/l edema.           Interval History: pt reports feeling much better today than yesterday.     Review of Systems   Constitutional: Positive for activity change, appetite change (poor appetite) and fever. Negative for chills, diaphoresis and fatigue.   Respiratory: Positive for cough (improved). Negative for shortness of breath.    Cardiovascular: Negative for chest pain and leg swelling.   Gastrointestinal: Negative for abdominal pain, diarrhea, nausea and vomiting.   Genitourinary: Negative for difficulty urinating, dysuria, flank pain and hematuria.   Musculoskeletal: Negative for arthralgias, back pain, joint swelling, myalgias and neck pain.   Skin: Positive for wound (open wound epigastric area). Negative for rash.   Neurological: Negative for dizziness, weakness and headaches.   Psychiatric/Behavioral: Negative for agitation. The patient is not nervous/anxious.      Objective:     Vital Signs (Most Recent):  Temp: 98.9 °F (37.2 °C) (01/07/22 1137)  Pulse: (!) 114 (01/07/22 1137)  Resp: 18 (01/07/22 0843)  BP: (!) 70/0 (01/07/22 1137)  SpO2: (!) 93 % (01/07/22 1137) Vital Signs (24h  Range):  Temp:  [97.8 °F (36.6 °C)-100.8 °F (38.2 °C)] 98.9 °F (37.2 °C)  Pulse:  [] 114  Resp:  [16-18] 18  SpO2:  [91 %-96 %] 93 %  BP: ()/(0-60) 70/0     Weight: 89.5 kg (197 lb 5 oz)  Body mass index is 30.9 kg/m².    Estimated Creatinine Clearance: 66.9 mL/min (based on SCr of 1.3 mg/dL).    Physical Exam  Vitals and nursing note reviewed.   Constitutional:       General: He is not in acute distress.     Appearance: He is well-developed. He is obese. He is ill-appearing. He is not toxic-appearing or diaphoretic.   Cardiovascular:      Rate and Rhythm: Tachycardia present.      Comments: VAD hum  Pulmonary:      Effort: Pulmonary effort is normal. No respiratory distress.      Breath sounds: No wheezing or rales.   Abdominal:      General: There is no distension.      Palpations: Abdomen is soft.      Tenderness: There is no abdominal tenderness. There is no guarding.      Comments: Epigastric wound undressed - no active drainage.  No tenderness, erythema.  Surrounding induration.      DLES site not dressed; no drainage. No erythema   Musculoskeletal:         General: No swelling or tenderness. Normal range of motion.      Cervical back: Normal range of motion.   Skin:     General: Skin is warm and dry.      Findings: No erythema or rash.   Neurological:      Mental Status: He is alert and oriented to person, place, and time.   Psychiatric:         Behavior: Behavior normal.         Significant Labs:   Blood Culture:   Recent Labs   Lab 01/01/22  0020 01/02/22  1946 01/03/22  1723 01/04/22  0420 01/04/22  0425   LABBLOO Gram stain aer bottle: Gram positive cocci   Results called to and read back by: DANIEL BLANCHARD RN  01/02/2022  22:36  METHICILLIN RESISTANT STAPHYLOCOCCUS AUREUS  ID consult required at [facility] and [facility] locations.  *  Gram stain aer bottle: Gram positive cocci in clusters resembling Staph   Results called to and read back by:DANIEL BLANCHARD 01/02/2022  23:29   METHICILLIN RESISTANT STAPHYLOCOCCUS AUREUS  ID consult required at [facility].  ID consult required at [facility].  * Gram stain aer bottle: Gram positive cocci in clusters resembling Staph  Positive results previously called  Gram stain uzma bottle: Gram positive cocci in clusters resembling Staph  Results called to and read back by:Khai Durand RN 01/04/2022  22:54  Gram stain aer bottle: Gram positive cocci   Positive results previously called  01/05/2022  STAPHYLOCOCCUS AUREUS  ID consult required at [facility].  For susceptibility see order #[id number]  * No Growth to date  No Growth to date  No Growth to date  No Growth to date  No Growth to date  No Growth to date  No Growth to date  No Growth to date No Growth to date  No Growth to date  No Growth to date  No Growth to date No Growth to date  No Growth to date  No Growth to date  No Growth to date     CBC:   Recent Labs   Lab 01/06/22  0459 01/07/22  0604   WBC 21.74* 16.22*   HGB 7.7* 8.2*   HCT 27.4* 27.9*   * 572*     CMP:   Recent Labs   Lab 01/06/22  0459 01/07/22  0604    131*   K 4.5 4.8    100   CO2 25 22*   GLU 96 134*   BUN 10 8   CREATININE 1.1 1.3   CALCIUM 8.2* 8.4*   PROT  --  7.5   ALBUMIN  --  2.3*   BILITOT  --  0.8   ALKPHOS  --  87   AST  --  20   ALT  --  13   ANIONGAP 11 9   EGFRNONAA >60.0 >60.0     Wound Culture:   Recent Labs   Lab 07/23/21  1004 08/09/21  1119 08/09/21  1128 10/05/21  1732 01/03/22  1822   LABAERO METHICILLIN RESISTANT STAPHYLOCOCCUS AUREUS  Moderate  * No growth No growth METHICILLIN RESISTANT STAPHYLOCOCCUS AUREUS  Rare  * PSEUDOMONAS AERUGINOSA  Few  *       Significant Imaging: I have reviewed all pertinent imaging results/findings within the past 24 hours.

## 2022-01-07 NOTE — PROGRESS NOTES
[facility] - Cardiology Stepdown  Cardiothoracic Surgery  Evaluation and Management/VAD interrogation       Patient Name: Saba Lott  MRN: [id number]  Admission Date: 12/31/2021  Hospital Length of Stay: 7 days  Code Status: Full Code   Attending Physician: Zulma Floyd MD   Referring Provider: Leslie, Provider  Principal Problem:Fever    Subjective:     Post-Op Info:  * No surgery found *         Subjective:     Interval History: NAEON. Some diarrhea. Still with temp 101 at times.       Medications:  Continuous Infusions:  Scheduled Meds:   albuterol-ipratropium  3 mL Nebulization Q4H    atorvastatin  20 mg Oral QHS    DAPTOmycin (CUBICIN)  IV  10 mg/kg Intravenous Q24H    docusate sodium  100 mg Oral Daily    Lactobacillus rhamnosus GG  1 capsule Oral Daily    magnesium oxide  400 mg Oral BID    mupirocin   Nasal BID    piperacillin-tazobactam (ZOSYN) IVPB  4.5 g Intravenous Q8H     PRN Meds:acetaminophen, benzonatate, diphenhydrAMINE, guaiFENesin 100 mg/5 ml, ondansetron     Objective:     Vital Signs (Most Recent):  Temp: 98.9 °F (37.2 °C) (01/07/22 1137)  Pulse: (!) 114 (01/07/22 1137)  Resp: 18 (01/07/22 0843)  BP: (!) 70/0 (01/07/22 1137)  SpO2: (!) 93 % (01/07/22 1137) Vital Signs (24h Range):  Temp:  [97.8 °F (36.6 °C)-100.8 °F (38.2 °C)] 98.9 °F (37.2 °C)  Pulse:  [] 114  Resp:  [16-18] 18  SpO2:  [91 %-96 %] 93 %  BP: ()/(0-60) 70/0       Intake/Output Summary (Last 24 hours) at 1/7/2022 1307  Last data filed at 1/7/2022 0931  Gross per 24 hour   Intake 702 ml   Output 775 ml   Net -73 ml       Physical Exam  Vitals reviewed.   Constitutional:       Appearance: He is well-developed.   HENT:      Head: Normocephalic and atraumatic.   Eyes:      Pupils: Pupils are equal, round, and reactive to light.   Cardiovascular:      Rate and Rhythm: Regular rhythm. Tachycardia present.   Pulmonary:      Effort: Pulmonary effort is normal. No respiratory distress.   Musculoskeletal:       Cervical back: Normal range of motion.   Skin:     Coloration: Skin is not pale.   Neurological:      Mental Status: He is alert and oriented to person, place, and time.   Psychiatric:         Mood and Affect: Mood normal.         Behavior: Behavior normal.         Significant Labs:  ABGs: No results for input(s): PH, PCO2, PO2, HCO3, POCSATURATED, BE in the last 48 hours.  Amylase: No results for input(s): AMYLASE in the last 48 hours.  BMP:   Recent Labs   Lab 01/07/22  0604   *   *   K 4.8      CO2 22*   BUN 8   CREATININE 1.3   CALCIUM 8.4*   MG 1.9     Cardiac markers: No results for input(s): CKMB, CPKMB, TROPONINT, TROPONINI, MYOGLOBIN in the last 48 hours.  CBC:   Recent Labs   Lab 01/07/22  0604   WBC 16.22*   RBC 4.59*   HGB 8.2*   HCT 27.9*   *   MCV 61*   MCH 17.9*   MCHC 29.4*     CMP:   Recent Labs   Lab 01/07/22  0604   *   CALCIUM 8.4*   ALBUMIN 2.3*   PROT 7.5   *   K 4.8   CO2 22*      BUN 8   CREATININE 1.3   ALKPHOS 87   ALT 13   AST 20   BILITOT 0.8     Coagulation:   Recent Labs   Lab 01/07/22  0604   INR 1.5*   APTT 39.1*     Lactic Acid: No results for input(s): LACTATE in the last 48 hours.  LFTs:   Recent Labs   Lab 01/07/22  0604   ALT 13   AST 20   ALKPHOS 87   BILITOT 0.8   PROT 7.5   ALBUMIN 2.3*     Lipase: No results for input(s): LIPASE in the last 48 hours.    Significant Diagnostics:  I have reviewed all pertinent imaging results/findings within the past 24 hours.    Procedure: Device Interrogation Including analysis of device parameters  Current Settings: Ventricular Assist Device  Review of device function is stable  TXP LVAD INTERROGATIONS 1/7/2022 1/7/2022 1/6/2022 1/6/2022 1/6/2022 1/6/2022 1/5/2022   Type HeartMate3 HeartMate3 HeartMate3 HeartMate3 HeartMate3 HeartMate3 HeartMate3   Flow 4.7 4.7 4.6 4.8 4.7 4.9 4.7   Speed 5300 5300 5300 5300 5300 5300 5300   PI 2.8 2.9 3.0 3.1 3.1 3.1 3.2   Power (Centeno) 3.9 3.8 3.8 3.8 3.8 3.8  3.9   LSL 4900 4900 4900 4900 4900 4900 4900   Pulsatility - Intermittent pulse Intermittent pulse Intermittent pulse Intermittent pulse Intermittent pulse Intermittent pulse     Assessment/Plan:     LVAD (left ventricular assist device) present  - Interval History was obtained from team member  during rounding today.  - Mobilization/Physical Therapy is ongoing.  - Anticoagulation held  - Admitted with sepsis   - WBC 16.2   - INR goal 1.5-2 but AC on hold. INR currently 1.5  -   - Creatinine 1.3  - ID following for abx management. Cultures 1/2 +. Cx 1/4 negative  - HTN meds on hold  - Some diarrhea   - BNP 1483    More than 50 percent of the care dominated counseling and coordinating care with different team members. The VAD was interrogated and significant findings noted above.         Eunice Fagan PA-C  Cardiothoracic Surgery  [facility] - Cardiology Stepdown

## 2022-01-07 NOTE — CARE UPDATE
RAPID RESPONSE NURSE ROUND       Rounding completed with charge RN, Magda. No concerns verbalized at this time. Instructed to call [phone] for further concerns or assistance.

## 2022-01-07 NOTE — PROGRESS NOTES
"Was asked to see pt for itching to skin around LVAD DLES.  There is no irritation, but his nurse reports he itches so bad and then removes his dressing.  They have been cleaning 3 x per day.  I provided his nurse with chlorhexidine swabs and asked going forward not to use 3 kits per day because of a shortage.  After the 1 full dressing change daily using a kit, only use one chlorhexidine swab, cover with drain sponge and gauze, then cover with sorbaview dressing.  Pt reports he has been itching for a year now with the sorbaview covers and with the medipore tape.  Looked for Calmoseptine to use around his skin, but unable to find to order so wound care consulted.  DLES="1".  All questions answered to pt satisfaction as evidence by verbal acknowledgement.   "

## 2022-01-07 NOTE — ASSESSMENT & PLAN NOTE
- Interval History was obtained from team member  during rounding today.  - Mobilization/Physical Therapy is ongoing.  - Anticoagulation held  - Admitted with sepsis   - WBC 16.2   - INR goal 1.5-2 but AC on hold. INR currently 1.5  -   - Creatinine 1.3  - ID following for abx management. Cultures 1/2 +. Cx 1/4 negative  - HTN meds on hold  - Some diarrhea   - BNP 1483    More than 50 percent of the care dominated counseling and coordinating care with different team members. The VAD was interrogated and significant findings noted above.

## 2022-01-07 NOTE — PLAN OF CARE
Pt dressing change done today due to pt itching it off, given benadryl per CONG Olson order. For next dressing change, will use Cavilon barrier skin film since pt states the tape makes him itchy. LVAD coordinator Umm also stated to use 1 chlorhexidine scrub and then apply drain sponge, gauze, and LVAD dressing if pt itches off LVAD dressing again rather than using a whole new kit 2-3x a day; all supplies in pt specific bin. Otherwise, pt LVAD numbers and dopplers WNL. Pt receiving antibiotics. Pt fever decreased today as well as heart rate which is now sustaining in the 110s. Plan of care discussed with patient. Patient is free of fall/trauma/injury. Denies CP, SOB, or pain/discomfort. All questions addressed. Will continue to monitor

## 2022-01-07 NOTE — PLAN OF CARE
LVAD dressing change done this AM due to it coming completely off. LVAD numbers and dopplers WNL.Pt with low gradefever, just received tylenol. HR sustaining in 130s, JUAN Lugo made aware and ordered two 250mL/hr boluses and orthostatic vitals. Pt receiving antibiotics. Pt complained of having liquid diarrhea, Rn observed diarrhea and it was soft, not liquid, Ordered a CDIFF stool sample, charge RN Magda made aware but since pt is on antibiotics, not a candidate for stool sample. Pt is on contact precautions for MRSA in blood. Abscess is Clean and Dry, no drainage. Pt belly distended. Plan of care discussed with patient. Patient is free of fall/trauma/injury. Denies CP, SOB, or pain/discomfort. All questions addressed. Will continue to monitor

## 2022-01-07 NOTE — NURSING
"LVAD dressing change completed using sterile technique with kit. DLES is a "1" with no drainage noted on the drain sponge. Tolerated without any complication. No redness or tenderness noted.     "

## 2022-01-07 NOTE — PROGRESS NOTES
01/06/22 1755 01/06/22 1758 01/06/22 1801   Vital Signs   BP (!) 80/0 (!) 76/0 (!) 80/0   BP Method Doppler Doppler Doppler   Patient Position Lying Sitting Standing   Pt orthostatic vitals. Unable to get systolic/diastolic  BP

## 2022-01-07 NOTE — ASSESSMENT & PLAN NOTE
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 On admit presented with cough, nausea, vomiting, weakness, and temp of 103 F  - Admits to noncompliance with suppressive Doxy PTA  - CT c/a/p on admit with left basilar ground-glass and airspace opacities with additional slight ground-glass attenuation in the left upper lobe.    - Blood cultures 1/1 & 1/2 positive for Staph, repeats NGTD  - Abdominal culture 1/3 positive for pseudomonas  - Remains febrile, TMax 100.8 F  - ID following, vanc changed to dapto 1/1 given hx of VISA. Cefepime changed to jacoby 1/2 and then jacoby changed to Zosyn 1/6  - CT c/a/p on admit with left basilar ground-glass and airspace opacities with additional slight ground-glass attenuation in the left upper lobe.    - Cough non productive, less frequent. Continue Duonebs

## 2022-01-07 NOTE — ASSESSMENT & PLAN NOTE
56-year-old male with history of DCM s/p LVAD 2/6/2020, left occipital IPH, lead endocarditis s/p removal, history of VISA and polymicrobial  bacteremia in October (pseudomonas, e coli, proteus.  Strongy negative) on chronic suppressive doxy, presents from clinic with fevers.    Blood cultures of 1/1 - 2/4 bottles + MRSA  Blood cultures 1/2 - 3/4 positive Staph Aureus  Blood cultures  1/3, 1/4  - NGTD   Abdominal wound cx - + Pseudomonas     CT A/P 1/2/22 - unchanged small region of soft tissue induration and slight skin thickening about the drive line in anterior abdominal wall. No definite focal fluid collection about the LVAD. No definite new regions of soft tissue induration or focal fluid collections are identified about the remaining subcutaneous course of the LVAD driveline  Noted patchy LLL GGO and airspace opacities with additional patchy ground glass attenuation in DEVORAH  Slight non-specific perinephric fat stranding - U/A wnl    Leukocytosis improving (21K down to 16K. Tmax 100.8. patient states he feels better today. COVID negative, RIP panel negative.    Plan:  1.   Continue IV daptomycin 10 mg/kg for now MRSA bacteremia. Confirmed susceptibility with Micro lab.  KALI <.5.   Weekly CK while on dapto  2.   Continue IV zosyn for pseudomonas from epigastric abdominal wound   3.   Follow repeat cultures  4.  Continue to monitor fever trend  5.   Will follow

## 2022-01-08 NOTE — PROGRESS NOTES
01/08/2022  Macey Saleh    Current provider:  Zulma Floyd MD    TXP LVAD INTERROGATIONS 1/8/2022 1/8/2022 1/8/2022 1/7/2022 1/7/2022 1/7/2022 1/7/2022   Type HeartMate3 HeartMate3 HeartMate3 HeartMate3 HeartMate3 HeartMate3 HeartMate3   Flow 4.7 4.6 4.8 4.8 4.8 4.7 4.7   Speed 5300 5300 5300 5300 5300 5300 5300   PI 3.7 4.2 3.2 3.2 3.2 2.8 2.9   Power (Centeno) 3.8 3.8 3.8 3.8 3.8 3.9 3.8   LSL 4900 4900 4900 4900 - 4900 4900   Pulsatility No Pulse Intermittent pulse Intermittent pulse Intermittent pulse - - Intermittent pulse          Rounded on Saba A Oren to ensure all mechanical assist device settings (IABP or VAD) were appropriate and all parameters were within limits.  I was able to ensure all back up equipment was present, the staff had no issues, and the Perfusion Department daily rounding was complete.    In emergency, the nursing units have been notified to contact the perfusion department either by:  Calling [phone] from 630am to 4pm Mon thru Fri, or by contacting the hospital  from 4pm to 630am and on weekends and asking to speak with the perfusionist on call.    1:05 PM

## 2022-01-08 NOTE — SUBJECTIVE & OBJECTIVE
Interval History: TMax 100.5 F over 24 hours. ID following, remains on Zosyn and dapto. No complaints.     Continuous Infusions:  Scheduled Meds:   albuterol-ipratropium  3 mL Nebulization Q4H    atorvastatin  20 mg Oral QHS    DAPTOmycin (CUBICIN)  IV  10 mg/kg Intravenous Q24H    docusate sodium  100 mg Oral Daily    Lactobacillus rhamnosus GG  1 capsule Oral Daily    magnesium oxide  400 mg Oral BID    mupirocin   Nasal BID    piperacillin-tazobactam (ZOSYN) IVPB  4.5 g Intravenous Q8H     PRN Meds:acetaminophen, benzonatate, diphenhydrAMINE, guaiFENesin 100 mg/5 ml, ondansetron    Review of patient's allergies indicates:   Allergen Reactions    Iodine and iodide containing products      Objective:     Vital Signs (Most Recent):  Temp: 100.1 °F (37.8 °C) (01/08/22 0749)  Pulse: (!) 112 (01/08/22 0825)  Resp: 19 (01/08/22 0825)  BP: (!) 74/0 (01/08/22 0815)  SpO2: 96 % (01/08/22 0825) Vital Signs (24h Range):  Temp:  [97.5 °F (36.4 °C)-100.5 °F (38.1 °C)] 100.1 °F (37.8 °C)  Pulse:  [105-125] 112  Resp:  [16-19] 19  SpO2:  [93 %-98 %] 96 %  BP: (70-80)/(0) 74/0     Patient Vitals for the past 72 hrs (Last 3 readings):   Weight   01/08/22 0842 88.8 kg (195 lb 12.3 oz)     Body mass index is 30.65 kg/m².      Intake/Output Summary (Last 24 hours) at 1/8/2022 0911  Last data filed at 1/8/2022 0518  Gross per 24 hour   Intake 1615.05 ml   Output 800 ml   Net 815.05 ml       Hemodynamic Parameters:       Telemetry: ST with PVC's    Physical Exam  Constitutional:       Appearance: Normal appearance.   HENT:      Head: Normocephalic and atraumatic.   Eyes:      Conjunctiva/sclera: Conjunctivae normal.   Neck:      Comments: Neck veins are not elevated  Cardiovascular:      Rate and Rhythm: Regular rhythm. Tachycardia present.      Comments: Smooth VAD hum  Pulmonary:      Effort: Pulmonary effort is normal.      Breath sounds: Normal breath sounds.   Abdominal:      General: Bowel sounds are normal.       Palpations: Abdomen is soft.   Musculoskeletal:         General: No swelling. Normal range of motion.      Cervical back: Normal range of motion and neck supple.   Skin:     General: Skin is warm and dry.      Capillary Refill: Capillary refill takes 2 to 3 seconds.   Neurological:      General: No focal deficit present.      Mental Status: He is alert and oriented to person, place, and time.   Psychiatric:         Mood and Affect: Mood normal.         Behavior: Behavior normal.         Thought Content: Thought content normal.         Judgment: Judgment normal.         Significant Labs:  CBC:  Recent Labs   Lab 01/06/22  0459 01/07/22  0604 01/08/22  0541   WBC 21.74* 16.22* 13.81*   RBC 4.37* 4.59* 4.69   HGB 7.7* 8.2* 8.2*   HCT 27.4* 27.9* 28.5*   * 572* 512*   MCV 63* 61* 61*   MCH 17.6* 17.9* 17.5*   MCHC 28.1* 29.4* 28.8*     BNP:  Recent Labs   Lab 01/03/22  0517 01/05/22  0507 01/07/22  0604   * 381* 1,483*     CMP:  Recent Labs   Lab 01/03/22  0517 01/03/22  0518 01/05/22  0507 01/05/22  0507 01/06/22  0459 01/07/22  0604 01/08/22  0541   GLU  --    < > 101   < > 96 134* 94   CALCIUM  --    < > 8.3*   < > 8.2* 8.4* 8.4*   ALBUMIN 2.4*  --  2.3*  --   --  2.3*  --    PROT 6.7  --  6.6  --   --  7.5  --    NA  --    < > 131*   < > 136 131* 129*   K  --    < > 4.1   < > 4.5 4.8 4.7   CO2  --    < > 27   < > 25 22* 18*   CL  --    < > 97   < > 100 100 102   BUN  --    < > 11   < > 10 8 9   CREATININE  --    < > 1.2   < > 1.1 1.3 1.2   ALKPHOS 89  --  81  --   --  87  --    ALT 8*  --  10  --   --  13  --    AST 13  --  17  --   --  20  --    BILITOT 0.8  --  0.6  --   --  0.8  --     < > = values in this interval not displayed.      Coagulation:   Recent Labs   Lab 01/06/22  0459 01/06/22  0459 01/06/22  1436 01/07/22  0604 01/08/22  0541   INR 3.0*   < > 2.3* 1.5* 1.5*   APTT 35.0*  --   --  39.1* 35.3*    < > = values in this interval not displayed.     LDH:  Recent Labs   Lab 01/06/22  0459  01/07/22  0604 01/08/22  0541   * 291* 259     Microbiology:  Microbiology Results (last 7 days)     Procedure Component Value Units Date/Time    Blood culture [[id number]] Collected: 01/03/22 1723    Order Status: Completed Specimen: Blood Updated: 01/07/22 2012     Blood Culture, Routine No Growth to date      No Growth to date      No Growth to date      No Growth to date      No Growth to date    Blood culture [[id number]] Collected: 01/03/22 1723    Order Status: Completed Specimen: Blood Updated: 01/07/22 2012     Blood Culture, Routine No Growth to date      No Growth to date      No Growth to date      No Growth to date      No Growth to date    Blood culture [[id number]] Collected: 01/07/22 0604    Order Status: Completed Specimen: Blood Updated: 01/07/22 1715     Blood Culture, Routine No Growth to date    Narrative:      From 2 different sites 30 minutes apart    Blood culture [[id number]] Collected: 01/07/22 0604    Order Status: Completed Specimen: Blood Updated: 01/07/22 1715     Blood Culture, Routine No Growth to date    Narrative:      From 2 different sites 30 minutes apart    Blood culture [[id number]] Collected: 01/04/22 0420    Order Status: Completed Specimen: Blood Updated: 01/07/22 1012     Blood Culture, Routine No Growth to date      No Growth to date      No Growth to date      No Growth to date    Blood culture [[id number]] Collected: 01/04/22 0425    Order Status: Completed Specimen: Blood Updated: 01/07/22 1012     Blood Culture, Routine No Growth to date      No Growth to date      No Growth to date      No Growth to date    Blood culture [[id number]] Collected: 01/02/22 1946    Order Status: Completed Specimen: Blood Updated: 01/07/22 0738     Blood Culture, Routine Gram stain aer bottle: Gram positive cocci in clusters resembling Staph      Positive results previously called    Clostridium difficile EIA [[id number]]     Order Status: Canceled Specimen: Stool     Blood culture  [[id number]]  (Abnormal) Collected: 01/02/22 1946    Order Status: Completed Specimen: Blood Updated: 01/06/22 1319     Blood Culture, Routine Gram stain uzma bottle: Gram positive cocci in clusters resembling Staph      Results called to and read back by:Khai Durand RN 01/04/2022  22:54      Gram stain aer bottle: Gram positive cocci       Positive results previously called  01/05/2022      STAPHYLOCOCCUS AUREUS  ID consult required at [facility].  For susceptibility see order #[id number]      Blood culture [[id number]]  (Abnormal)  (Susceptibility) Collected: 01/01/22 0020    Order Status: Completed Specimen: Blood Updated: 01/06/22 1141     Blood Culture, Routine Gram stain aer bottle: Gram positive cocci       Results called to and read back by: DANIEL BLANCHARD RN  01/02/2022  22:36      METHICILLIN RESISTANT STAPHYLOCOCCUS AUREUS  ID consult required at [facility].       Comment: Previous comment was modified by RHONA MELGOZA at 10:51 on 01/06/2022 ID   consult required at [facility].ID   consult required at [facility].         Aerobic culture [[id number]]  (Abnormal)  (Susceptibility) Collected: 01/03/22 1822    Order Status: Completed Specimen: Wound from Abdomen Updated: 01/06/22 1126     Aerobic Bacterial Culture PSEUDOMONAS AERUGINOSA  Few      Blood culture [[id number]]  (Abnormal)  (Susceptibility) Collected: 01/01/22 0020    Order Status: Completed Specimen: Blood Updated: 01/06/22 1052     Blood Culture, Routine Gram stain aer bottle: Gram positive cocci in clusters resembling Staph       Results called to and read back by:DANIEL BLANCHARD 01/02/2022  23:29      METHICILLIN RESISTANT STAPHYLOCOCCUS AUREUS  ID consult required at [facility].  ID consult required at [facility].      Culture, Anaerobe [[id number]] Collected: 01/03/22 1822     Order Status: Completed Specimen: Wound from Abdomen Updated: 01/06/22 0847     Anaerobic Culture Culture in progress    Culture, Respiratory with Gram Stain [[id number]]     Order Status: No result Specimen: Respiratory from Sputum, Expectorated     Respiratory Infection Panel (PCR), Nasopharyngeal [[id number]] Collected: 01/03/22 1822    Order Status: Completed Specimen: Nasopharyngeal Swab Updated: 01/03/22 2107     Respiratory Infection Panel Source NP Swab     Adenovirus Not Detected     Coronavirus 229E, Common Cold Virus Not Detected     Coronavirus HKU1, Common Cold Virus Not Detected     Coronavirus NL63, Common Cold Virus Not Detected     Coronavirus OC43, Common Cold Virus Not Detected     Comment: The Coronavirus strains detected in this test cause the common cold.  These strains are not the COVID-19 (novel Coronavirus)strain   associated with the respiratory disease outbreak.          Human Metapneumovirus Not Detected     Human Rhinovirus/Enterovirus Not Detected     Influenza A (subtypes H1, H1-2009,H3) Not Detected     Influenza B Not Detected     Parainfluenza Virus 1 Not Detected     Parainfluenza Virus 2 Not Detected     Parainfluenza Virus 3 Not Detected     Parainfluenza Virus 4 Not Detected     Respiratory Syncytial Virus Not Detected     Bordetella Parapertussis (YH7444) Not Detected     Bordetella pertussis (ptxP) Not Detected     Chlamydia pneumoniae Not Detected     Mycoplasma pneumoniae Not Detected    Narrative:      For all other respiratory sources, order [id number] -  Respiratory Viral Panel by PCR          I have reviewed all pertinent labs within the past 24 hours.    Estimated Creatinine Clearance: 72.2 mL/min (based on SCr of 1.2 mg/dL).    Diagnostic Results:  I have reviewed all pertinent imaging results/findings within the past 24 hours.

## 2022-01-08 NOTE — ASSESSMENT & PLAN NOTE
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 INR 1.5 today after vitamin K on 1/6(INR 3.0 despite holding coumadin)  -LDH stable  -ECHO 10/9/21 with EF 20%, BRYAN, IVS bows into RV, G1DD, s/p Alferi stitch, mild-moderate RVSF, CVP 3, LVEDD 6.43 with LVAD speed set to 5300 rpm      Procedure: Device Interrogation Including analysis of device parameters  Current Settings: Ventricular Assist Device  Review of device function is stable      TXP LVAD INTERROGATIONS 1/8/2022 1/8/2022 1/8/2022 1/7/2022 1/7/2022 1/7/2022 1/7/2022   Type HeartMate3 HeartMate3 HeartMate3 HeartMate3 HeartMate3 HeartMate3 HeartMate3   Flow 4.7 4.6 4.8 4.8 4.8 4.7 4.7   Speed 5300 5300 5300 5300 5300 5300 5300   PI 3.7 4.2 3.2 3.2 3.2 2.8 2.9   Power (Centeno) 3.8 3.8 3.8 3.8 3.8 3.9 3.8   LSL 4900 4900 4900 4900 - 4900 4900   Pulsatility No Pulse Intermittent pulse Intermittent pulse Intermittent pulse - - Intermittent pulse

## 2022-01-08 NOTE — ASSESSMENT & PLAN NOTE
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 On admit presented with cough, nausea, vomiting, weakness, and temp of 103 F  - Admits to noncompliance with suppressive Doxy PTA  - CT c/a/p on admit with left basilar ground-glass and airspace opacities with additional slight ground-glass attenuation in the left upper lobe.    - Blood cultures 1/1 & 1/2 positive for Staph, repeats NGTD  - Abdominal culture 1/3 positive for pseudomonas  - Remains febrile, TMax 100.5 F  - ID following, vanc changed to dapto on 1/1 given hx of VISA. Cefepime changed to jacoby 1/2 and then jacoby changed to Zosyn 1/6  - CT c/a/p on admit with left basilar ground-glass and airspace opacities with additional slight ground-glass attenuation in the left upper lobe.    - Cough non productive, less frequent. Continue Duonebs

## 2022-01-08 NOTE — PLAN OF CARE
Problem: Infection  Goal: Absence of Infection Signs and Symptoms  Outcome: Ongoing, Progressing     Problem: Impaired Wound Healing  Goal: Optimal Wound Healing  Outcome: Ongoing, Progressing     Problem: Adjustment to Device (Ventricular Assist Device)  Goal: Optimal Adjustment to Device  Outcome: Ongoing, Progressing     Problem: Bleeding (Ventricular Assist Device)  Goal: Absence of Bleeding  Outcome: Ongoing, Progressing     Problem: Embolism (Ventricular Assist Device)  Goal: Absence of Embolism Signs and Symptoms  Outcome: Ongoing, Progressing     Problem: Hemodynamic Instability (Ventricular Assist Device)  Goal: Optimal Blood Flow  Outcome: Ongoing, Progressing     Problem: Infection (Ventricular Assist Device)  Goal: Absence of Infection Signs and Symptoms  Outcome: Ongoing, Progressing     Problem: Right-Sided Heart Compromise (Ventricular Assist Device)  Goal: Effective Right-Sided Heart Function  Outcome: Ongoing, Progressing     Problem: Adult Inpatient Plan of Care  Goal: Plan of Care Review  Outcome: Ongoing, Progressing  Goal: Patient-Specific Goal (Individualized)  Outcome: Ongoing, Progressing  Goal: Absence of Hospital-Acquired Illness or Injury  Outcome: Ongoing, Progressing  Goal: Optimal Comfort and Wellbeing  Outcome: Ongoing, Progressing  Goal: Readiness for Transition of Care  Outcome: Ongoing, Progressing     Problem: Fall Injury Risk  Goal: Absence of Fall and Fall-Related Injury  Outcome: Ongoing, Progressing     Problem: Adjustment to Illness (Sepsis/Septic Shock)  Goal: Optimal Coping  Outcome: Ongoing, Progressing     Problem: Bleeding (Sepsis/Septic Shock)  Goal: Absence of Bleeding  Outcome: Ongoing, Progressing     Problem: Glycemic Control Impaired (Sepsis/Septic Shock)  Goal: Blood Glucose Level Within Desired Range  Outcome: Ongoing, Progressing     Problem: Infection Progression (Sepsis/Septic Shock)  Goal: Absence of Infection Signs and Symptoms  Outcome: Ongoing,  Progressing     Problem: Nutrition Impaired (Sepsis/Septic Shock)  Goal: Optimal Nutrition Intake  Outcome: Ongoing, Progressing     Problem: Coping Ineffective  Goal: Effective Coping  Outcome: Ongoing, Progressing

## 2022-01-08 NOTE — PROGRESS NOTES
[facility] - [facility]iology Stepdown  Heart Transplant  Progress Note    Patient Name: Saba Lott  MRN: [id number]  Admission Date: 12/31/2021  Hospital Length of Stay: 8 days  Attending Physician: Zulma Floyd MD  Primary Care Provider: Mary Lombardi MD  Principal Problem:Fever    Subjective:     Interval History: TMax 100.5 F over 24 hours. ID following, remains on Zosyn and dapto. No complaints.     Continuous Infusions:  Scheduled Meds:   albuterol-ipratropium  3 mL Nebulization Q4H    atorvastatin  20 mg Oral QHS    DAPTOmycin (CUBICIN)  IV  10 mg/kg Intravenous Q24H    docusate sodium  100 mg Oral Daily    Lactobacillus rhamnosus GG  1 capsule Oral Daily    magnesium oxide  400 mg Oral BID    mupirocin   Nasal BID    piperacillin-tazobactam (ZOSYN) IVPB  4.5 g Intravenous Q8H     PRN Meds:acetaminophen, benzonatate, diphenhydrAMINE, guaiFENesin 100 mg/5 ml, ondansetron    Review of patient's allergies indicates:   Allergen Reactions    Iodine and iodide containing products      Objective:     Vital Signs (Most Recent):  Temp: 100.1 °F (37.8 °C) (01/08/22 0749)  Pulse: (!) 112 (01/08/22 0825)  Resp: 19 (01/08/22 0825)  BP: (!) 74/0 (01/08/22 0815)  SpO2: 96 % (01/08/22 0825) Vital Signs (24h Range):  Temp:  [97.5 °F (36.4 °C)-100.5 °F (38.1 °C)] 100.1 °F (37.8 °C)  Pulse:  [105-125] 112  Resp:  [16-19] 19  SpO2:  [93 %-98 %] 96 %  BP: (70-80)/(0) 74/0     Patient Vitals for the past 72 hrs (Last 3 readings):   Weight   01/08/22 0842 88.8 kg (195 lb 12.3 oz)     Body mass index is 30.65 kg/m².      Intake/Output Summary (Last 24 hours) at 1/8/2022 0911  Last data filed at 1/8/2022 0518  Gross per 24 hour   Intake 1615.05 ml   Output 800 ml   Net 815.05 ml       Hemodynamic Parameters:       Telemetry: ST with PVC's    Physical Exam  Constitutional:       Appearance: Normal appearance.   HENT:      Head: Normocephalic and atraumatic.   Eyes:      Conjunctiva/sclera: Conjunctivae normal.   Neck:       Comments: Neck veins are not elevated  Cardiovascular:      Rate and Rhythm: Regular rhythm. Tachycardia present.      Comments: Smooth VAD hum  Pulmonary:      Effort: Pulmonary effort is normal.      Breath sounds: Normal breath sounds.   Abdominal:      General: Bowel sounds are normal.      Palpations: Abdomen is soft.   Musculoskeletal:         General: No swelling. Normal range of motion.      Cervical back: Normal range of motion and neck supple.   Skin:     General: Skin is warm and dry.      Capillary Refill: Capillary refill takes 2 to 3 seconds.   Neurological:      General: No focal deficit present.      Mental Status: He is alert and oriented to person, place, and time.   Psychiatric:         Mood and Affect: Mood normal.         Behavior: Behavior normal.         Thought Content: Thought content normal.         Judgment: Judgment normal.         Significant Labs:  CBC:  Recent Labs   Lab 01/06/22  0459 01/07/22  0604 01/08/22  0541   WBC 21.74* 16.22* 13.81*   RBC 4.37* 4.59* 4.69   HGB 7.7* 8.2* 8.2*   HCT 27.4* 27.9* 28.5*   * 572* 512*   MCV 63* 61* 61*   MCH 17.6* 17.9* 17.5*   MCHC 28.1* 29.4* 28.8*     BNP:  Recent Labs   Lab 01/03/22  0517 01/05/22  0507 01/07/22  0604   * 381* 1,483*     CMP:  Recent Labs   Lab 01/03/22  0517 01/03/22  0518 01/05/22  0507 01/05/22  0507 01/06/22  0459 01/07/22  0604 01/08/22  0541   GLU  --    < > 101   < > 96 134* 94   CALCIUM  --    < > 8.3*   < > 8.2* 8.4* 8.4*   ALBUMIN 2.4*  --  2.3*  --   --  2.3*  --    PROT 6.7  --  6.6  --   --  7.5  --    NA  --    < > 131*   < > 136 131* 129*   K  --    < > 4.1   < > 4.5 4.8 4.7   CO2  --    < > 27   < > 25 22* 18*   CL  --    < > 97   < > 100 100 102   BUN  --    < > 11   < > 10 8 9   CREATININE  --    < > 1.2   < > 1.1 1.3 1.2   ALKPHOS 89  --  81  --   --  87  --    ALT 8*  --  10  --   --  13  --    AST 13  --  17  --   --  20  --    BILITOT 0.8  --  0.6  --   --  0.8  --     < > = values in this  interval not displayed.      Coagulation:   Recent Labs   Lab 01/06/22  0459 01/06/22  0459 01/06/22  1436 01/07/22  0604 01/08/22  0541   INR 3.0*   < > 2.3* 1.5* 1.5*   APTT 35.0*  --   --  39.1* 35.3*    < > = values in this interval not displayed.     LDH:  Recent Labs   Lab 01/06/22 0459 01/07/22  0604 01/08/22  0541   * 291* 259     Microbiology:  Microbiology Results (last 7 days)     Procedure Component Value Units Date/Time    Blood culture [[id number]] Collected: 01/03/22 1723    Order Status: Completed Specimen: Blood Updated: 01/07/22 2012     Blood Culture, Routine No Growth to date      No Growth to date      No Growth to date      No Growth to date      No Growth to date    Blood culture [[id number]] Collected: 01/03/22 1723    Order Status: Completed Specimen: Blood Updated: 01/07/22 2012     Blood Culture, Routine No Growth to date      No Growth to date      No Growth to date      No Growth to date      No Growth to date    Blood culture [[id number]] Collected: 01/07/22 0604    Order Status: Completed Specimen: Blood Updated: 01/07/22 1715     Blood Culture, Routine No Growth to date    Narrative:      From 2 different sites 30 minutes apart    Blood culture [[id number]] Collected: 01/07/22 0604    Order Status: Completed Specimen: Blood Updated: 01/07/22 1715     Blood Culture, Routine No Growth to date    Narrative:      From 2 different sites 30 minutes apart    Blood culture [[id number]] Collected: 01/04/22 0420    Order Status: Completed Specimen: Blood Updated: 01/07/22 1012     Blood Culture, Routine No Growth to date      No Growth to date      No Growth to date      No Growth to date    Blood culture [[id number]] Collected: 01/04/22 0425    Order Status: Completed Specimen: Blood Updated: 01/07/22 1012     Blood Culture, Routine No Growth to date      No Growth to date      No Growth to date      No Growth to date    Blood culture [[id number]] Collected: 01/02/22 1946    Order Status:  Completed Specimen: Blood Updated: 01/07/22 0738     Blood Culture, Routine Gram stain aer bottle: Gram positive cocci in clusters resembling Staph      Positive results previously called    Clostridium difficile EIA [[id number]]     Order Status: Canceled Specimen: Stool     Blood culture [[id number]]  (Abnormal) Collected: 01/02/22 1946    Order Status: Completed Specimen: Blood Updated: 01/06/22 1319     Blood Culture, Routine Gram stain uzma bottle: Gram positive cocci in clusters resembling Staph      Results called to and read back by:Khai Durand RN 01/04/2022  22:54      Gram stain aer bottle: Gram positive cocci       Positive results previously called  01/05/2022      STAPHYLOCOCCUS AUREUS  ID consult required at [facility].  For susceptibility see order #[id number]      Blood culture [[id number]]  (Abnormal)  (Susceptibility) Collected: 01/01/22 0020    Order Status: Completed Specimen: Blood Updated: 01/06/22 1141     Blood Culture, Routine Gram stain aer bottle: Gram positive cocci       Results called to and read back by: DANIEL BLANCHARD RN  01/02/2022  22:36      METHICILLIN RESISTANT STAPHYLOCOCCUS AUREUS  ID consult required at [facility].       Comment: Previous comment was modified by RHONA MELGOZA at 10:51 on 01/06/2022 ID   consult required at [facility].ID   consult required at [facility].         Aerobic culture [[id number]]  (Abnormal)  (Susceptibility) Collected: 01/03/22 1822    Order Status: Completed Specimen: Wound from Abdomen Updated: 01/06/22 1126     Aerobic Bacterial Culture PSEUDOMONAS AERUGINOSA  Few      Blood culture [[id number]]  (Abnormal)  (Susceptibility) Collected: 01/01/22 0020    Order Status: Completed Specimen: Blood Updated: 01/06/22 1052     Blood Culture, Routine Gram stain aer bottle: Gram positive cocci in clusters resembling Staph       Results called to and  read back by:DANIEL BLANCHARD 01/02/2022  23:29      METHICILLIN RESISTANT STAPHYLOCOCCUS AUREUS  ID consult required at [facility].  ID consult required at [facility] and [facility].      Culture, Anaerobe [[id number]] Collected: 01/03/22 1822    Order Status: Completed Specimen: Wound from Abdomen Updated: 01/06/22 0847     Anaerobic Culture Culture in progress    Culture, Respiratory with Gram Stain [[id number]]     Order Status: No result Specimen: Respiratory from Sputum, Expectorated     Respiratory Infection Panel (PCR), Nasopharyngeal [[id number]] Collected: 01/03/22 1822    Order Status: Completed Specimen: Nasopharyngeal Swab Updated: 01/03/22 2107     Respiratory Infection Panel Source NP Swab     Adenovirus Not Detected     Coronavirus 229E, Common Cold Virus Not Detected     Coronavirus HKU1, Common Cold Virus Not Detected     Coronavirus NL63, Common Cold Virus Not Detected     Coronavirus OC43, Common Cold Virus Not Detected     Comment: The Coronavirus strains detected in this test cause the common cold.  These strains are not the COVID-19 (novel Coronavirus)strain   associated with the respiratory disease outbreak.          Human Metapneumovirus Not Detected     Human Rhinovirus/Enterovirus Not Detected     Influenza A (subtypes H1, H1-2009,H3) Not Detected     Influenza B Not Detected     Parainfluenza Virus 1 Not Detected     Parainfluenza Virus 2 Not Detected     Parainfluenza Virus 3 Not Detected     Parainfluenza Virus 4 Not Detected     Respiratory Syncytial Virus Not Detected     Bordetella Parapertussis (BK7132) Not Detected     Bordetella pertussis (ptxP) Not Detected     Chlamydia pneumoniae Not Detected     Mycoplasma pneumoniae Not Detected    Narrative:      For all other respiratory sources, order [id number] -  Respiratory Viral Panel by PCR          I have reviewed all pertinent labs within the past 24 hours.    Estimated Creatinine Clearance: 72.2  mL/min (based on SCr of 1.2 mg/dL).    Diagnostic Results:  I have reviewed all pertinent imaging results/findings within the past 24 hours.    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 Patient is short of breath and uncomfortable at the time of giving history. He reports chills, weakness, cough, sputum production, weight gain, nausea, vomiting and trouble breathing. He went to his PCP office yesterday where his temp was 103 and was sent to the hospital afterwards. His white count is 20 on arrival. He is unable to lay flat. His HR is 102-142 sinus. He needs to be diuresed upon arrival but K is 2.9, ordered IV and PO Kcl, he vomited out oral potassium, IV replacement will be done.       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 On admit presented with cough, nausea, vomiting, weakness, and temp of 103 F  - Admits to noncompliance with suppressive Doxy PTA  - CT c/a/p on admit with left basilar ground-glass and airspace opacities with additional slight ground-glass attenuation in the left upper lobe.    - Blood cultures 1/1 & 1/2 positive for Staph, repeats NGTD  - Abdominal culture 1/3 positive for pseudomonas  - Remains febrile, TMax 100.5 F  - ID following, vanc changed to dapto on 1/1 given hx of VISA. Cefepime changed to jacoby 1/2 and then jacoby changed to Zosyn 1/6  - CT c/a/p on admit with left basilar ground-glass and airspace opacities with additional slight ground-glass attenuation in the left upper lobe.    - Cough non productive, less frequent. Continue Duonebs            Sepsis  -See fever    Hypokalemia  - K  4.8  - Aldactone was increased to 50 mg qd 1/1 for hypokalemia. However Cr above baseline at 1.4, decreased back to 25 mg qd 1/3 and then discontinued 1/4 with improvement in kidney function    LVAD (left ventricular assist device) present  -S/P DT HM3 with MVR 2/2/20  -Current speed 5300  -INR goal 1.5-2.0 (hx of ICH). INR 1.5 today after vitamin K on 1/6(INR 3.0 despite holding coumadin)  -LDH stable  -ECHO 10/9/21 with EF 20%, BRYAN, IVS bows into RV, G1DD, s/p Alferi stitch, mild-moderate RVSF, CVP 3, LVEDD 6.43 with LVAD speed set to 5300 rpm      Procedure: Device Interrogation Including analysis of device parameters  Current Settings: Ventricular Assist Device  Review of device function is stable      TXP LVAD INTERROGATIONS 1/8/2022 1/8/2022 1/8/2022 1/7/2022 1/7/2022 1/7/2022 1/7/2022   Type HeartMate3 HeartMate3 HeartMate3 HeartMate3 HeartMate3 HeartMate3 HeartMate3   Flow 4.7 4.6 4.8 4.8 4.8 4.7 4.7   Speed 5300 5300 5300 5300 5300 5300 5300   PI 3.7 4.2 3.2 3.2 3.2 2.8 2.9   Power (Centeno) 3.8 3.8 3.8 3.8 3.8 3.9 3.8   LSL 4900 4900 4900 4900 - 4900 4900   Pulsatility No Pulse Intermittent pulse Intermittent pulse Intermittent pulse - - Intermittent pulse         Melissa Olson PA-C  Heart Transplant  [facility] - Cardiology Stepdown

## 2022-01-09 NOTE — ASSESSMENT & PLAN NOTE
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 On admit presented with cough, nausea, vomiting, weakness, and temp of 103 F  - Admits to noncompliance with suppressive Doxy PTA  - CT c/a/p on admit with left basilar ground-glass and airspace opacities with additional slight ground-glass attenuation in the left upper lobe.    - Blood cultures 1/1 & 1/2 positive for Staph, repeats NGTD  - Abdominal culture 1/3 positive for pseudomonas  - Remains febrile, TMax 100.6 F  - ID following, vanc changed to dapto on 1/1 given hx of VISA. Cefepime changed to jacoby 1/2 and then jacoby changed to Zosyn 1/6  - CT c/a/p on admit with left basilar ground-glass and airspace opacities with additional slight ground-glass attenuation in the left upper lobe.   - Abdomen distended and tender- will obtain repeat CT c/a/p STAT

## 2022-01-09 NOTE — PROGRESS NOTES
01/09/2022  Macey Saleh    Current provider:  Zulma Floyd MD    TXP LVAD INTERROGATIONS 1/9/2022 1/9/2022 1/9/2022 1/8/2022 1/8/2022 1/8/2022 1/8/2022   Type HeartMate3 HeartMate3 HeartMate3 HeartMate3 HeartMate3 HeartMate3 HeartMate3   Flow 4.7 4.7 4.8 4.8 4.6 4.3 4.7   Speed 5300 5300 5300 5300 5300 5300 5300   PI 2.9 3.1 3.9 3.9 4.0 3.9 3.7   Power (Centeno) 3.8 3.8 3.8 3.8 3.5 3.8 3.8   LSL 4900 4900 4900 - 4900 - 4900   Pulsatility - - No Pulse - No Pulse - No Pulse          Rounded on Saba A Oren to ensure all mechanical assist device settings (IABP or VAD) were appropriate and all parameters were within limits.  I was able to ensure all back up equipment was present, the staff had no issues, and the Perfusion Department daily rounding was complete.    In emergency, the nursing units have been notified to contact the perfusion department either by:  Calling [phone] from 630am to 4pm Mon thru Fri, or by contacting the hospital  from 4pm to 630am and on weekends and asking to speak with the perfusionist on call.    11:21 AM

## 2022-01-09 NOTE — PLAN OF CARE
Plan of care discussed with patient.  Patient ambulating independently, fall precautions in place. LVAD DP and numbers WNL, smooth LVAD hum. Patient has no complaints of pain. Patient receiving IV abx q8. Discussed medications and care. Patient has no questions at this time. Will continue to monitor.

## 2022-01-09 NOTE — PLAN OF CARE
Brief Interval Note    CTAP and chest imaging reviewed. Results showing:  Increased soft tissue thickening of distal superior mesenteric vasculature with apparent vessel enlargement.  Engorged distal small vessel mesenteric vasculature and surrounding edema/stranding.  Suspicious of  distal SMV thrombosis. Differential also includes mesenteric panniculitis however felt less likely as per radiology report. INR today is 1.9 - within therapeutic range for patient, given history of previous intraparenchymal hemorrhage. Will continue to monitor INR and consider anticoagulation with Heparin IV in AM after discussion with pharmacist if there is a change in INR.    Discussed with Dr. Floyd, TEO attending       Ayo Mustafa MD  [facility]  SpectraLink: [id number]

## 2022-01-09 NOTE — SUBJECTIVE & OBJECTIVE
Interval History: TMax 100.6 F over 24 hours. ID following, remains on Zosyn and dapto. Pt reports abdominal pain. Abdomen distended and tender on exam. Will obtain CT c/a/p STAT.     Continuous Infusions:  Scheduled Meds:   albuterol-ipratropium  3 mL Nebulization Q4H    atorvastatin  20 mg Oral QHS    DAPTOmycin (CUBICIN)  IV  10 mg/kg Intravenous Q24H    docusate sodium  100 mg Oral Daily    Lactobacillus rhamnosus GG  1 capsule Oral Daily    magnesium oxide  400 mg Oral TID    mupirocin   Nasal BID    piperacillin-tazobactam (ZOSYN) IVPB  4.5 g Intravenous Q8H     PRN Meds:acetaminophen, aluminum & magnesium hydroxide-simethicone, benzonatate, diphenhydrAMINE, guaiFENesin 100 mg/5 ml, ondansetron    Review of patient's allergies indicates:   Allergen Reactions    Iodine and iodide containing products      Objective:     Vital Signs (Most Recent):  Temp: 98.5 °F (36.9 °C) (01/09/22 0839)  Pulse: 80 (01/09/22 0846)  Resp: 20 (01/09/22 0846)  BP: (!) 70/0 (01/09/22 0839)  SpO2: 96 % (01/09/22 0846) Vital Signs (24h Range):  Temp:  [97.7 °F (36.5 °C)-100.6 °F (38.1 °C)] 98.5 °F (36.9 °C)  Pulse:  [] 80  Resp:  [16-22] 20  SpO2:  [94 %-100 %] 96 %  BP: (70-80)/(0) 70/0     Patient Vitals for the past 72 hrs (Last 3 readings):   Weight   01/08/22 0842 88.8 kg (195 lb 12.3 oz)     Body mass index is 30.65 kg/m².      Intake/Output Summary (Last 24 hours) at 1/9/2022 0909  Last data filed at 1/9/2022 0527  Gross per 24 hour   Intake 640 ml   Output 790 ml   Net -150 ml       Hemodynamic Parameters:       Telemetry: ST with PVC's    Physical Exam  Constitutional:       Appearance: Normal appearance.   HENT:      Head: Normocephalic and atraumatic.   Eyes:      Conjunctiva/sclera: Conjunctivae normal.   Neck:      Comments: Neck veins are not elevated  Cardiovascular:      Rate and Rhythm: Regular rhythm. Tachycardia present.      Comments: Smooth VAD hum  Pulmonary:      Effort: Pulmonary effort is normal.       Breath sounds: Normal breath sounds.   Abdominal:      General: Bowel sounds are normal.      Palpations: Abdomen is soft.   Musculoskeletal:         General: No swelling. Normal range of motion.      Cervical back: Normal range of motion and neck supple.   Skin:     General: Skin is warm and dry.      Capillary Refill: Capillary refill takes 2 to 3 seconds.   Neurological:      General: No focal deficit present.      Mental Status: He is alert and oriented to person, place, and time.   Psychiatric:         Mood and Affect: Mood normal.         Behavior: Behavior normal.         Thought Content: Thought content normal.         Judgment: Judgment normal.         Significant Labs:  CBC:  Recent Labs   Lab 01/07/22  0604 01/08/22  0541 01/09/22  0517   WBC 16.22* 13.81* 13.11*   RBC 4.59* 4.69 4.32*   HGB 8.2* 8.2* 7.7*   HCT 27.9* 28.5* 25.9*   * 512* 570*   MCV 61* 61* 60*   MCH 17.9* 17.5* 17.8*   MCHC 29.4* 28.8* 29.7*     BNP:  Recent Labs   Lab 01/03/22  0517 01/05/22  0507 01/07/22  0604   * 381* 1,483*     CMP:  Recent Labs   Lab 01/03/22  0517 01/03/22  0518 01/05/22  0507 01/06/22  0459 01/07/22  0604 01/08/22  0541 01/09/22  0518   GLU  --    < > 101   < > 134* 94 92   CALCIUM  --    < > 8.3*   < > 8.4* 8.4* 8.2*   ALBUMIN 2.4*  --  2.3*  --  2.3*  --   --    PROT 6.7  --  6.6  --  7.5  --   --    NA  --    < > 131*   < > 131* 129* 131*   K  --    < > 4.1   < > 4.8 4.7 4.1   CO2  --    < > 27   < > 22* 18* 22*   CL  --    < > 97   < > 100 102 101   BUN  --    < > 11   < > 8 9 8   CREATININE  --    < > 1.2   < > 1.3 1.2 1.1   ALKPHOS 89  --  81  --  87  --   --    ALT 8*  --  10  --  13  --   --    AST 13  --  17  --  20  --   --    BILITOT 0.8  --  0.6  --  0.8  --   --     < > = values in this interval not displayed.      Coagulation:   Recent Labs   Lab 01/07/22  0604 01/08/22  0541 01/09/22  0517   INR 1.5* 1.5* 1.9*   APTT 39.1* 35.3* 37.7*     LDH:  Recent Labs   Lab 01/07/22  0604  01/08/22  0541 01/09/22  0517   * 259 506*     Microbiology:  Microbiology Results (last 7 days)     Procedure Component Value Units Date/Time    Blood culture [[id number]] Collected: 01/03/22 1723    Order Status: Completed Specimen: Blood Updated: 01/08/22 2012     Blood Culture, Routine No growth after 5 days.    Blood culture [[id number]] Collected: 01/03/22 1723    Order Status: Completed Specimen: Blood Updated: 01/08/22 2012     Blood Culture, Routine No growth after 5 days.    Blood culture [[id number]]  (Abnormal) Collected: 01/02/22 1946    Order Status: Completed Specimen: Blood Updated: 01/08/22 1226     Blood Culture, Routine Gram stain aer bottle: Gram positive cocci in clusters resembling Staph      Positive results previously called      METHICILLIN RESISTANT STAPHYLOCOCCUS AUREUS  ID consult required at [facility].  For susceptibility see order #[id number]      Blood culture [[id number]]  (Abnormal) Collected: 01/02/22 1946    Order Status: Completed Specimen: Blood Updated: 01/08/22 1013     Blood Culture, Routine Gram stain uzma bottle: Gram positive cocci in clusters resembling Staph      Results called to and read back by:Khai Durand RN 01/04/2022  22:54      Gram stain aer bottle: Gram positive cocci       Positive results previously called  01/05/2022     Comment: Previous comment was modified by NEG NEG at 11:38 on 01/05/2022 Gram   stain uzma bottle: Gram positive cocci in clusters resembling   StaphResults called to and read back by:Khai Durand RN 01/04/2022 22:54           METHICILLIN RESISTANT STAPHYLOCOCCUS AUREUS  ID consult required at [facility].  For susceptibility see order #[id number]      Blood culture [[id number]] Collected: 01/07/22 0604    Order Status: Completed Specimen: Blood Updated: 01/08/22 1012     Blood Culture, Routine No Growth to date      No Growth to date    Narrative:      From 2 different sites 30  minutes apart    Blood culture [[id number]] Collected: 01/07/22 0604    Order Status: Completed Specimen: Blood Updated: 01/08/22 1012     Blood Culture, Routine No Growth to date      No Growth to date    Narrative:      From 2 different sites 30 minutes apart    Blood culture [[id number]] Collected: 01/04/22 0420    Order Status: Completed Specimen: Blood Updated: 01/08/22 1012     Blood Culture, Routine No Growth to date      No Growth to date      No Growth to date      No Growth to date      No Growth to date    Blood culture [[id number]] Collected: 01/04/22 0425    Order Status: Completed Specimen: Blood Updated: 01/08/22 1012     Blood Culture, Routine No Growth to date      No Growth to date      No Growth to date      No Growth to date      No Growth to date    Clostridium difficile EIA [[id number]]     Order Status: Canceled Specimen: Stool     Blood culture [[id number]]  (Abnormal)  (Susceptibility) Collected: 01/01/22 0020    Order Status: Completed Specimen: Blood Updated: 01/06/22 1141     Blood Culture, Routine Gram stain aer bottle: Gram positive cocci       Results called to and read back by: DANIEL BLANCHARD RN  01/02/2022  22:36      METHICILLIN RESISTANT STAPHYLOCOCCUS AUREUS  ID consult required at [facility].       Comment: Previous comment was modified by RHONA MELGOZA at 10:51 on 01/06/2022 ID   consult required at [facility].ID   consult required at [facility].         Aerobic culture [[id number]]  (Abnormal)  (Susceptibility) Collected: 01/03/22 1822    Order Status: Completed Specimen: Wound from Abdomen Updated: 01/06/22 1126     Aerobic Bacterial Culture PSEUDOMONAS AERUGINOSA  Few      Blood culture [[id number]]  (Abnormal)  (Susceptibility) Collected: 01/01/22 0020    Order Status: Completed Specimen: Blood Updated: 01/06/22 1052     Blood Culture, Routine Gram stain aer bottle: Gram positive cocci in clusters  resembling Staph       Results called to and read back by:DANIEL BLANCHARD 01/02/2022  23:29      METHICILLIN RESISTANT STAPHYLOCOCCUS AUREUS  ID consult required at [facility] and [facility].  ID consult required at [facility] and [facility].      Culture, Anaerobe [[id number]] Collected: 01/03/22 1822    Order Status: Completed Specimen: Wound from Abdomen Updated: 01/06/22 0847     Anaerobic Culture Culture in progress    Culture, Respiratory with Gram Stain [[id number]]     Order Status: No result Specimen: Respiratory from Sputum, Expectorated     Respiratory Infection Panel (PCR), Nasopharyngeal [[id number]] Collected: 01/03/22 1822    Order Status: Completed Specimen: Nasopharyngeal Swab Updated: 01/03/22 2107     Respiratory Infection Panel Source NP Swab     Adenovirus Not Detected     Coronavirus 229E, Common Cold Virus Not Detected     Coronavirus HKU1, Common Cold Virus Not Detected     Coronavirus NL63, Common Cold Virus Not Detected     Coronavirus OC43, Common Cold Virus Not Detected     Comment: The Coronavirus strains detected in this test cause the common cold.  These strains are not the COVID-19 (novel Coronavirus)strain   associated with the respiratory disease outbreak.          Human Metapneumovirus Not Detected     Human Rhinovirus/Enterovirus Not Detected     Influenza A (subtypes H1, H1-2009,H3) Not Detected     Influenza B Not Detected     Parainfluenza Virus 1 Not Detected     Parainfluenza Virus 2 Not Detected     Parainfluenza Virus 3 Not Detected     Parainfluenza Virus 4 Not Detected     Respiratory Syncytial Virus Not Detected     Bordetella Parapertussis (TG2495) Not Detected     Bordetella pertussis (ptxP) Not Detected     Chlamydia pneumoniae Not Detected     Mycoplasma pneumoniae Not Detected    Narrative:      For all other respiratory sources, order [id number] -  Respiratory Viral Panel by PCR          I have reviewed all pertinent labs within the past 24  hours.    Estimated Creatinine Clearance: 78.8 mL/min (based on SCr of 1.1 mg/dL).    Diagnostic Results:  I have reviewed all pertinent imaging results/findings within the past 24 hours.

## 2022-01-09 NOTE — PROGRESS NOTES
[facility] - Cardiology Stepdown  Heart Transplant  Progress Note    Patient Name: Saba Lott  MRN: [id number]  Admission Date: 12/31/2021  Hospital Length of Stay: 9 days  Attending Physician: Zulma Floyd MD  Primary Care Provider: Mary Lombardi MD  Principal Problem:Fever    Subjective:     Interval History: TMax 100.6 F over 24 hours. ID following, remains on Zosyn and dapto. Pt reports abdominal pain. Abdomen distended and tender on exam. Will obtain CT c/a/p STAT.     Continuous Infusions:  Scheduled Meds:   albuterol-ipratropium  3 mL Nebulization Q4H    atorvastatin  20 mg Oral QHS    DAPTOmycin (CUBICIN)  IV  10 mg/kg Intravenous Q24H    docusate sodium  100 mg Oral Daily    Lactobacillus rhamnosus GG  1 capsule Oral Daily    magnesium oxide  400 mg Oral TID    mupirocin   Nasal BID    piperacillin-tazobactam (ZOSYN) IVPB  4.5 g Intravenous Q8H     PRN Meds:acetaminophen, aluminum & magnesium hydroxide-simethicone, benzonatate, diphenhydrAMINE, guaiFENesin 100 mg/5 ml, ondansetron    Review of patient's allergies indicates:   Allergen Reactions    Iodine and iodide containing products      Objective:     Vital Signs (Most Recent):  Temp: 98.5 °F (36.9 °C) (01/09/22 0839)  Pulse: 80 (01/09/22 0846)  Resp: 20 (01/09/22 0846)  BP: (!) 70/0 (01/09/22 0839)  SpO2: 96 % (01/09/22 0846) Vital Signs (24h Range):  Temp:  [97.7 °F (36.5 °C)-100.6 °F (38.1 °C)] 98.5 °F (36.9 °C)  Pulse:  [] 80  Resp:  [16-22] 20  SpO2:  [94 %-100 %] 96 %  BP: (70-80)/(0) 70/0     Patient Vitals for the past 72 hrs (Last 3 readings):   Weight   01/08/22 0842 88.8 kg (195 lb 12.3 oz)     Body mass index is 30.65 kg/m².      Intake/Output Summary (Last 24 hours) at 1/9/2022 0909  Last data filed at 1/9/2022 0527  Gross per 24 hour   Intake 640 ml   Output 790 ml   Net -150 ml       Hemodynamic Parameters:       Telemetry: ST with PVC's    Physical Exam  Constitutional:       Appearance: Normal appearance.   HENT:       Head: Normocephalic and atraumatic.   Eyes:      Conjunctiva/sclera: Conjunctivae normal.   Neck:      Comments: Neck veins are not elevated  Cardiovascular:      Rate and Rhythm: Regular rhythm. Tachycardia present.      Comments: Smooth VAD hum  Pulmonary:      Effort: Pulmonary effort is normal.      Breath sounds: Normal breath sounds.   Abdominal:      General: Bowel sounds are normal.      Palpations: Abdomen is soft.   Musculoskeletal:         General: No swelling. Normal range of motion.      Cervical back: Normal range of motion and neck supple.   Skin:     General: Skin is warm and dry.      Capillary Refill: Capillary refill takes 2 to 3 seconds.   Neurological:      General: No focal deficit present.      Mental Status: He is alert and oriented to person, place, and time.   Psychiatric:         Mood and Affect: Mood normal.         Behavior: Behavior normal.         Thought Content: Thought content normal.         Judgment: Judgment normal.         Significant Labs:  CBC:  Recent Labs   Lab 01/07/22  0604 01/08/22  0541 01/09/22  0517   WBC 16.22* 13.81* 13.11*   RBC 4.59* 4.69 4.32*   HGB 8.2* 8.2* 7.7*   HCT 27.9* 28.5* 25.9*   * 512* 570*   MCV 61* 61* 60*   MCH 17.9* 17.5* 17.8*   MCHC 29.4* 28.8* 29.7*     BNP:  Recent Labs   Lab 01/03/22  0517 01/05/22  0507 01/07/22  0604   * 381* 1,483*     CMP:  Recent Labs   Lab 01/03/22  0517 01/03/22  0518 01/05/22  0507 01/06/22  0459 01/07/22  0604 01/08/22  0541 01/09/22  0518   GLU  --    < > 101   < > 134* 94 92   CALCIUM  --    < > 8.3*   < > 8.4* 8.4* 8.2*   ALBUMIN 2.4*  --  2.3*  --  2.3*  --   --    PROT 6.7  --  6.6  --  7.5  --   --    NA  --    < > 131*   < > 131* 129* 131*   K  --    < > 4.1   < > 4.8 4.7 4.1   CO2  --    < > 27   < > 22* 18* 22*   CL  --    < > 97   < > 100 102 101   BUN  --    < > 11   < > 8 9 8   CREATININE  --    < > 1.2   < > 1.3 1.2 1.1   ALKPHOS 89  --  81  --  87  --   --    ALT 8*  --  10  --  13  --    --    AST 13  --  17  --  20  --   --    BILITOT 0.8  --  0.6  --  0.8  --   --     < > = values in this interval not displayed.      Coagulation:   Recent Labs   Lab 01/07/22  0604 01/08/22  0541 01/09/22  0517   INR 1.5* 1.5* 1.9*   APTT 39.1* 35.3* 37.7*     LDH:  Recent Labs   Lab 01/07/22  0604 01/08/22  0541 01/09/22  0517   * 259 506*     Microbiology:  Microbiology Results (last 7 days)     Procedure Component Value Units Date/Time    Blood culture [[id number]] Collected: 01/03/22 1723    Order Status: Completed Specimen: Blood Updated: 01/08/22 2012     Blood Culture, Routine No growth after 5 days.    Blood culture [[id number]] Collected: 01/03/22 1723    Order Status: Completed Specimen: Blood Updated: 01/08/22 2012     Blood Culture, Routine No growth after 5 days.    Blood culture [[id number]]  (Abnormal) Collected: 01/02/22 1946    Order Status: Completed Specimen: Blood Updated: 01/08/22 1226     Blood Culture, Routine Gram stain aer bottle: Gram positive cocci in clusters resembling Staph      Positive results previously called      METHICILLIN RESISTANT STAPHYLOCOCCUS AUREUS  ID consult required at [facility] and [facility].  For susceptibility see order #[id number]      Blood culture [[id number]]  (Abnormal) Collected: 01/02/22 1946    Order Status: Completed Specimen: Blood Updated: 01/08/22 1013     Blood Culture, Routine Gram stain uzma bottle: Gram positive cocci in clusters resembling Staph      Results called to and read back by:Khai Durand RN 01/04/2022  22:54      Gram stain aer bottle: Gram positive cocci       Positive results previously called  01/05/2022     Comment: Previous comment was modified by NEG NEG at 11:38 on 01/05/2022 Gram   stain uzma bottle: Gram positive cocci in clusters resembling   StaphResults called to and read back by:Khai Durand RN 01/04/2022 22:54           METHICILLIN RESISTANT STAPHYLOCOCCUS AUREUS  ID consult required at [facility] and  [facility].  For susceptibility see order #[id number]      Blood culture [[id number]] Collected: 01/07/22 0604    Order Status: Completed Specimen: Blood Updated: 01/08/22 1012     Blood Culture, Routine No Growth to date      No Growth to date    Narrative:      From 2 different sites 30 minutes apart    Blood culture [[id number]] Collected: 01/07/22 0604    Order Status: Completed Specimen: Blood Updated: 01/08/22 1012     Blood Culture, Routine No Growth to date      No Growth to date    Narrative:      From 2 different sites 30 minutes apart    Blood culture [[id number]] Collected: 01/04/22 0420    Order Status: Completed Specimen: Blood Updated: 01/08/22 1012     Blood Culture, Routine No Growth to date      No Growth to date      No Growth to date      No Growth to date      No Growth to date    Blood culture [[id number]] Collected: 01/04/22 0425    Order Status: Completed Specimen: Blood Updated: 01/08/22 1012     Blood Culture, Routine No Growth to date      No Growth to date      No Growth to date      No Growth to date      No Growth to date    Clostridium difficile EIA [[id number]]     Order Status: Canceled Specimen: Stool     Blood culture [[id number]]  (Abnormal)  (Susceptibility) Collected: 01/01/22 0020    Order Status: Completed Specimen: Blood Updated: 01/06/22 1141     Blood Culture, Routine Gram stain aer bottle: Gram positive cocci       Results called to and read back by: DANIEL BLANCHARD RN  01/02/2022  22:36      METHICILLIN RESISTANT STAPHYLOCOCCUS AUREUS  ID consult required at [facility].       Comment: Previous comment was modified by RHONA MELGOZA at 10:51 on 01/06/2022 ID   consult required at [facility].ID   consult required at [facility].         Aerobic culture [[id number]]  (Abnormal)  (Susceptibility) Collected: 01/03/22 1822    Order Status: Completed Specimen: Wound from Abdomen Updated: 01/06/22  1126     Aerobic Bacterial Culture PSEUDOMONAS AERUGINOSA  Few      Blood culture [[id number]]  (Abnormal)  (Susceptibility) Collected: 01/01/22 0020    Order Status: Completed Specimen: Blood Updated: 01/06/22 1052     Blood Culture, Routine Gram stain aer bottle: Gram positive cocci in clusters resembling Staph       Results called to and read back by:DANIEL BLANCHARD 01/02/2022  23:29      METHICILLIN RESISTANT STAPHYLOCOCCUS AUREUS  ID consult required at [facility].  ID consult required at [facility].      Culture, Anaerobe [[id number]] Collected: 01/03/22 1822    Order Status: Completed Specimen: Wound from Abdomen Updated: 01/06/22 0847     Anaerobic Culture Culture in progress    Culture, Respiratory with Gram Stain [[id number]]     Order Status: No result Specimen: Respiratory from Sputum, Expectorated     Respiratory Infection Panel (PCR), Nasopharyngeal [[id number]] Collected: 01/03/22 1822    Order Status: Completed Specimen: Nasopharyngeal Swab Updated: 01/03/22 2107     Respiratory Infection Panel Source NP Swab     Adenovirus Not Detected     Coronavirus 229E, Common Cold Virus Not Detected     Coronavirus HKU1, Common Cold Virus Not Detected     Coronavirus NL63, Common Cold Virus Not Detected     Coronavirus OC43, Common Cold Virus Not Detected     Comment: The Coronavirus strains detected in this test cause the common cold.  These strains are not the COVID-19 (novel Coronavirus)strain   associated with the respiratory disease outbreak.          Human Metapneumovirus Not Detected     Human Rhinovirus/Enterovirus Not Detected     Influenza A (subtypes H1, H1-2009,H3) Not Detected     Influenza B Not Detected     Parainfluenza Virus 1 Not Detected     Parainfluenza Virus 2 Not Detected     Parainfluenza Virus 3 Not Detected     Parainfluenza Virus 4 Not Detected     Respiratory Syncytial Virus Not Detected     Bordetella Parapertussis (GX5804) Not  Detected     Bordetella pertussis (ptxP) Not Detected     Chlamydia pneumoniae Not Detected     Mycoplasma pneumoniae Not Detected    Narrative:      For all other respiratory sources, order [id number] -  Respiratory Viral Panel by PCR          I have reviewed all pertinent labs within the past 24 hours.    Estimated Creatinine Clearance: 78.8 mL/min (based on SCr of 1.1 mg/dL).    Diagnostic Results:  I have reviewed all pertinent imaging results/findings within the past 24 hours.    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 Patient is short of breath and uncomfortable at the time of giving history. He reports chills, weakness, cough, sputum production, weight gain, nausea, vomiting and trouble breathing. He went to his PCP office yesterday where his temp was 103 and was sent to the hospital afterwards. His white count is 20 on arrival. He is unable to lay flat. His HR is 102-142 sinus. He needs to be diuresed upon arrival but K is 2.9, ordered IV and PO Kcl, he vomited out oral potassium, IV replacement will be done.       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 On admit presented with cough, nausea, vomiting, weakness, and temp of 103 F  - Admits to noncompliance with suppressive Doxy PTA  - CT c/a/p on admit with left basilar ground-glass and airspace opacities with additional slight ground-glass attenuation in the left upper lobe.    - Blood cultures 1/1 & 1/2 positive for Staph, repeats NGTD  - Abdominal culture 1/3 positive for pseudomonas  - Remains febrile, TMax 100.6 F  - ID following, vanc changed to dapto on  1/1 given hx of VISA. Cefepime changed to jacoby 1/2 and then jacoby changed to Zosyn 1/6  - CT c/a/p on admit with left basilar ground-glass and airspace opacities with additional slight ground-glass attenuation in the left upper lobe.   - Abdomen distended and tender- will obtain repeat CT c/a/p STAT     Sepsis  -See fever    Hypokalemia  - K 4.1  - Aldactone was increased to 50 mg qd 1/1 for hypokalemia. However Cr above baseline at 1.4, decreased back to 25 mg qd 1/3 and then discontinued 1/4 with improvement in kidney function    LVAD (left ventricular assist device) present  -S/P DT HM3 with MVR 2/2/20  -Current speed 5300  -INR goal 1.5-2.0 (hx of ICH). INR 1.5 today after vitamin K on 1/6(INR 3.0 despite holding coumadin)  -LDH elevated at 506. Will repeat STAT.   -ECHO 10/9/21 with EF 20%, BRYAN, IVS bows into RV, G1DD, s/p Alferi stitch, mild-moderate RVSF, CVP 3, LVEDD 6.43 with LVAD speed set to 5300 rpm      Procedure: Device Interrogation Including analysis of device parameters  Current Settings: Ventricular Assist Device  Review of device function is stable      TXP LVAD INTERROGATIONS 1/9/2022 1/9/2022 1/9/2022 1/8/2022 1/8/2022 1/8/2022 1/8/2022   Type HeartMate3 HeartMate3 HeartMate3 HeartMate3 HeartMate3 HeartMate3 HeartMate3   Flow 4.7 4.7 4.8 4.8 4.6 4.3 4.7   Speed 5300 5300 5300 5300 5300 5300 5300   PI 2.9 3.1 3.9 3.9 4.0 3.9 3.7   Power (Centeno) 3.8 3.8 3.8 3.8 3.5 3.8 3.8   LSL 4900 4900 4900 - 4900 - 4900   Pulsatility - - No Pulse - No Pulse - No Pulse           Melissa Olson PA-C  Heart Transplant  [facility] - Cardiology Stepdown

## 2022-01-09 NOTE — ASSESSMENT & PLAN NOTE
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 INR 1.5 today after vitamin K on 1/6(INR 3.0 despite holding coumadin)  -LDH elevated at 506. Will repeat STAT.   -ECHO 10/9/21 with EF 20%, BRYAN, IVS bows into RV, G1DD, s/p Alferi stitch, mild-moderate RVSF, CVP 3, LVEDD 6.43 with LVAD speed set to 5300 rpm      Procedure: Device Interrogation Including analysis of device parameters  Current Settings: Ventricular Assist Device  Review of device function is stable      TXP LVAD INTERROGATIONS 1/9/2022 1/9/2022 1/9/2022 1/8/2022 1/8/2022 1/8/2022 1/8/2022   Type HeartMate3 HeartMate3 HeartMate3 HeartMate3 HeartMate3 HeartMate3 HeartMate3   Flow 4.7 4.7 4.8 4.8 4.6 4.3 4.7   Speed 5300 5300 5300 5300 5300 5300 5300   PI 2.9 3.1 3.9 3.9 4.0 3.9 3.7   Power ([place]eno) 3.8 3.8 3.8 3.8 3.5 3.8 3.8   LSL 4900 4900 4900 - 4900 - 4900   Pulsatility - - No Pulse - No Pulse - No Pulse

## 2022-01-09 NOTE — PLAN OF CARE
Plan of care discussed with patient. Patient free of falls/traumas/injuries, fall precautions in place. LVAD DP and numbers WNL, smooth LVAD hum. IV abx continued. LVAD dressing change performed multiple times during this shift using sterile technique by RN. Patient scratches and removes the dressing. Patient educated to notify RN if dressing comes off. DLES KUSHAL 2 times on different occassions when RN checked it with old removed dressing found either on bedside table or on the floor. Patient has no complaints of pain. Discussed medications and care. Patient has no questions at this time. Will continue to monitor.

## 2022-01-09 NOTE — ASSESSMENT & PLAN NOTE
- K 4.1  - Aldactone was increased to 50 mg qd 1/1 for hypokalemia. However Cr above baseline at 1.4, decreased back to 25 mg qd 1/3 and then discontinued 1/4 with improvement in kidney function

## 2022-01-10 PROBLEM — E87.6 HYPOKALEMIA: Status: RESOLVED | Noted: 2022-01-01 | Resolved: 2022-01-01

## 2022-01-10 NOTE — SUBJECTIVE & OBJECTIVE
**Interval History: T max down to 99.1, WCC still 13k. Blood cxs 1/3 and 1/4 -ve, 1/7 with NGTD. Abdomen remains distended but is non tender. Stool loose since admit. No appetite. 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 Dapto and Zosyn continue. INR trending back up at 2.3 despite holding Coumadin.    Continuous Infusions:  Scheduled Meds:   albuterol-ipratropium  3 mL Nebulization Q4H    atorvastatin  20 mg Oral QHS    DAPTOmycin (CUBICIN)  IV  10 mg/kg Intravenous Q24H    docusate sodium  100 mg Oral Daily    Lactobacillus rhamnosus GG  1 capsule Oral Daily    magnesium oxide  400 mg Oral TID    mupirocin   Nasal BID    piperacillin-tazobactam (ZOSYN) IVPB  4.5 g Intravenous Q8H     PRN Meds:acetaminophen, aluminum & magnesium hydroxide-simethicone, benzonatate, diphenhydrAMINE, guaiFENesin 100 mg/5 ml, ondansetron    Review of patient's allergies indicates:   Allergen Reactions    Iodine and iodide containing products      Objective:     Vital Signs (Most Recent):  Temp: 98.3 °F (36.8 °C) (01/10/22 0813)  Pulse: 110 (01/10/22 0813)  Resp: 18 (01/10/22 0813)  BP: (!) 82/0 (01/10/22 0813)  SpO2: 95 % (01/10/22 0813) Vital Signs (24h Range):  Temp:  [98 °F (36.7 °C)-99.1 °F (37.3 °C)] 98.3 °F (36.8 °C)  Pulse:  [] 110  Resp:  [16-20] 18  SpO2:  [94 %-98 %] 95 %  BP: (70-82)/(0) 82/0     Patient Vitals for the past 72 hrs (Last 3 readings):   Weight   01/08/22 0842 88.8 kg (195 lb 12.3 oz)     Body mass index is 30.65 kg/m².      Intake/Output Summary (Last 24 hours) at 1/10/2022 0850  Last data filed at 1/9/2022 1700  Gross per 24 hour   Intake 684 ml   Output 300 ml   Net 384 ml       Hemodynamic Parameters:       Telemetry: ST with PVC's    Physical Exam  Constitutional:       Appearance: Normal appearance.   HENT:      Head: Normocephalic and atraumatic.   Eyes:      Conjunctiva/sclera: Conjunctivae normal.   Neck:      Comments: Neck veins are not elevated  Cardiovascular:       Rate and Rhythm: Regular rhythm. Tachycardia present.      Comments: Smooth VAD hum  Pulmonary:      Effort: Pulmonary effort is normal.      Breath sounds: Normal breath sounds.   Abdominal:      General: Bowel sounds are normal. There is distension.   Musculoskeletal:         General: No swelling. Normal range of motion.      Cervical back: Normal range of motion and neck supple.   Skin:     General: Skin is warm and dry.      Capillary Refill: Capillary refill takes 2 to 3 seconds.   Neurological:      General: No focal deficit present.      Mental Status: He is alert and oriented to person, place, and time.   Psychiatric:         Mood and Affect: Mood normal.         Behavior: Behavior normal.         Thought Content: Thought content normal.         Judgment: Judgment normal.         Significant Labs:  CBC:  Recent Labs   Lab 01/08/22  0541 01/09/22  0517 01/10/22  0538   WBC 13.81* 13.11* 13.51*   RBC 4.69 4.32* 4.17*   HGB 8.2* 7.7* 7.4*   HCT 28.5* 25.9* 25.7*   * 570* 579*   MCV 61* 60* 62*   MCH 17.5* 17.8* 17.7*   MCHC 28.8* 29.7* 28.8*     BNP:  Recent Labs   Lab 01/05/22  0507 01/07/22  0604 01/10/22  0539   * 1,483* 602*     CMP:  Recent Labs   Lab 01/05/22  0507 01/06/22  0459 01/07/22  0604 01/07/22  0604 01/08/22  0541 01/09/22  0518 01/10/22  0538      < > 134*   < > 94 92 101   CALCIUM 8.3*   < > 8.4*   < > 8.4* 8.2* 8.5*   ALBUMIN 2.3*  --  2.3*  --   --   --  2.1*   PROT 6.6  --  7.5  --   --   --  7.1   *   < > 131*   < > 129* 131* 133*   K 4.1   < > 4.8   < > 4.7 4.1 4.1   CO2 27   < > 22*   < > 18* 22* 24   CL 97   < > 100   < > 102 101 101   BUN 11   < > 8   < > 9 8 6   CREATININE 1.2   < > 1.3   < > 1.2 1.1 1.0   ALKPHOS 81  --  87  --   --   --  70   ALT 10  --  13  --   --   --  19   AST 17  --  20  --   --   --  33   BILITOT 0.6  --  0.8  --   --   --  0.6    < > = values in this interval not displayed.      Coagulation:   Recent Labs   Lab 01/08/22  0509  01/09/22  0517 01/10/22  0538   INR 1.5* 1.9* 2.3*   APTT 35.3* 37.7* 39.9*     LDH:  Recent Labs   Lab 01/08/22  0541 01/09/22  0517 01/09/22  0923 01/10/22  0538    506* 231 240     Microbiology:  Microbiology Results (last 7 days)     Procedure Component Value Units Date/Time    Culture, Anaerobe [[id number]] Collected: 01/03/22 1822    Order Status: Completed Specimen: Wound from Abdomen Updated: 01/10/22 0850     Anaerobic Culture No anaerobes isolated    Blood culture [[id number]] Collected: 01/07/22 0604    Order Status: Completed Specimen: Blood Updated: 01/09/22 1012     Blood Culture, Routine No Growth to date      No Growth to date      No Growth to date    Narrative:      From 2 different sites 30 minutes apart    Blood culture [[id number]] Collected: 01/07/22 0604    Order Status: Completed Specimen: Blood Updated: 01/09/22 1012     Blood Culture, Routine No Growth to date      No Growth to date      No Growth to date    Narrative:      From 2 different sites 30 minutes apart    Blood culture [[id number]] Collected: 01/04/22 0425    Order Status: Completed Specimen: Blood Updated: 01/09/22 1012     Blood Culture, Routine No growth after 5 days.    Blood culture [[id number]] Collected: 01/04/22 0420    Order Status: Completed Specimen: Blood Updated: 01/09/22 1012     Blood Culture, Routine No growth after 5 days.    Blood culture [[id number]] Collected: 01/03/22 1723    Order Status: Completed Specimen: Blood Updated: 01/08/22 2012     Blood Culture, Routine No growth after 5 days.    Blood culture [[id number]] Collected: 01/03/22 1723    Order Status: Completed Specimen: Blood Updated: 01/08/22 2012     Blood Culture, Routine No growth after 5 days.    Blood culture [[id number]]  (Abnormal) Collected: 01/02/22 1946    Order Status: Completed Specimen: Blood Updated: 01/08/22 1226     Blood Culture, Routine Gram stain aer bottle: Gram positive cocci in clusters resembling Staph      Positive results  previously called      METHICILLIN RESISTANT STAPHYLOCOCCUS AUREUS  ID consult required at [facility].  For susceptibility see order #[id number]      Blood culture [[id number]]  (Abnormal) Collected: 01/02/22 1946    Order Status: Completed Specimen: Blood Updated: 01/08/22 1013     Blood Culture, Routine Gram stain uzma bottle: Gram positive cocci in clusters resembling Staph      Results called to and read back by:Khai Durand RN 01/04/2022  22:54      Gram stain aer bottle: Gram positive cocci       Positive results previously called  01/05/2022     Comment: Previous comment was modified by NEG NEG at 11:38 on 01/05/2022 Gram   stain uzma bottle: Gram positive cocci in clusters resembling   StaphResults called to and read back by:Khai Durand RN 01/04/2022 22:54           METHICILLIN RESISTANT STAPHYLOCOCCUS AUREUS  ID consult required at [facility].  For susceptibility see order #[id number]      Clostridium difficile EIA [[id number]]     Order Status: Canceled Specimen: Stool     Blood culture [[id number]]  (Abnormal)  (Susceptibility) Collected: 01/01/22 0020    Order Status: Completed Specimen: Blood Updated: 01/06/22 1141     Blood Culture, Routine Gram stain aer bottle: Gram positive cocci       Results called to and read back by: DANIEL BLANCHARD RN  01/02/2022  22:36      METHICILLIN RESISTANT STAPHYLOCOCCUS AUREUS  ID consult required at [facility].       Comment: Previous comment was modified by RHONA MELGOZA at 10:51 on 01/06/2022 ID   consult required at [facility].ID   consult required at [facility].         Aerobic culture [[id number]]  (Abnormal)  (Susceptibility) Collected: 01/03/22 1822    Order Status: Completed Specimen: Wound from Abdomen Updated: 01/06/22 1126     Aerobic Bacterial Culture PSEUDOMONAS AERUGINOSA  Few      Blood culture [[id number]]  (Abnormal)   (Susceptibility) Collected: 01/01/22 0020    Order Status: Completed Specimen: Blood Updated: 01/06/22 1052     Blood Culture, Routine Gram stain aer bottle: Gram positive cocci in clusters resembling Staph       Results called to and read back by:DANIEL BLANCHARD 01/02/2022  23:29      METHICILLIN RESISTANT STAPHYLOCOCCUS AUREUS  ID consult required at [facility] and [facility] locations.  ID consult required at [facility] and [facility].      Culture, Respiratory with Gram Stain [[id number]]     Order Status: No result Specimen: Respiratory from Sputum, Expectorated     Respiratory Infection Panel (PCR), Nasopharyngeal [[id number]] Collected: 01/03/22 1822    Order Status: Completed Specimen: Nasopharyngeal Swab Updated: 01/03/22 2107     Respiratory Infection Panel Source NP Swab     Adenovirus Not Detected     Coronavirus 229E, Common Cold Virus Not Detected     Coronavirus HKU1, Common Cold Virus Not Detected     Coronavirus NL63, Common Cold Virus Not Detected     Coronavirus OC43, Common Cold Virus Not Detected     Comment: The Coronavirus strains detected in this test cause the common cold.  These strains are not the COVID-19 (novel Coronavirus)strain   associated with the respiratory disease outbreak.          Human Metapneumovirus Not Detected     Human Rhinovirus/Enterovirus Not Detected     Influenza A (subtypes H1, H1-2009,H3) Not Detected     Influenza B Not Detected     Parainfluenza Virus 1 Not Detected     Parainfluenza Virus 2 Not Detected     Parainfluenza Virus 3 Not Detected     Parainfluenza Virus 4 Not Detected     Respiratory Syncytial Virus Not Detected     Bordetella Parapertussis (VZ0590) Not Detected     Bordetella pertussis (ptxP) Not Detected     Chlamydia pneumoniae Not Detected     Mycoplasma pneumoniae Not Detected    Narrative:      For all other respiratory sources, order [id number] -  Respiratory Viral Panel by PCR          I have reviewed all pertinent labs within the  past 24 hours.    Estimated Creatinine Clearance: 86.7 mL/min (based on SCr of 1 mg/dL).    Diagnostic Results:  I have reviewed all pertinent imaging results/findings within the past 24 hours.

## 2022-01-10 NOTE — PROGRESS NOTES
"[facility] - Cardiology Stepdown  Adult Nutrition  Progress Note    SUMMARY       Recommendations    1. Continue Regular diet, fluid per MD   2. Add Boost Plus (vanilla) to aid in energy and protein intake   3. RD to monitor and follow    Goals: Pt to receive ONS by RD follow-up  Nutrition Goal Status: Goal not met  Communication of RD Recs: POC    Assessment and Plan    Nutrition Problem  Inadequate energy intake            Related to (etiology):   Decreased appetite     Signs and Symptoms (as evidenced by):   Decreased PO intake of 25%-50% of meals     Interventions/Recommendations (treatment strategy):  Collaboration of nutrition care with other providers     Nutrition Diagnosis Status:   Continues     Reason for Assessment    Reason For Assessment: RD follow-up  Diagnosis:  (fever)  Relevant Medical History: LVAD, sepsis, alcohol abuse, stroke  Interdisciplinary Rounds: did not attend  General Information Comments: Pt was sleeping in bed with no family present at bedside. Pt awoke with little prompting. Pt reports a "so-so" appetite, eating approx 50% of meals.  lbs. Denies N/V/C; loose stools per NP note. NFPE not warranted - pt appears nourished. Pt amenable to trying Boost Plus (vanilla).  Nutrition Discharge Planning: Heart healthy diet    Nutrition Risk Screen    Nutrition Risk Screen: no indicators present    Nutrition/Diet History    Patient Reported Diet/Restrictions/Preferences: general  Spiritual, Cultural Beliefs, Religion Practices, Values that Affect Care: no  Food Allergies: NKFA  Factors Affecting Nutritional Intake: decreased appetite    Anthropometrics    Temp: 98.3 °F (36.8 °C)  Height Method: Stated  Height: 5' 7.01" (170.2 cm)  Height (inches): 67.01 in  Weight Method: Standard Scale  Weight: 88.8 kg (195 lb 12.3 oz)  Weight (lb): 195.77 lb  Ideal Body Weight (IBW), Male: 148.06 lb  % Ideal Body Weight, Male (lb): 132.22 %  BMI (Calculated): 30.7  Usual Body Weight (UBW), k.7 kg  % " Usual Body Weight: 98.11  % Weight Change From Usual Weight: -2.1 %       Lab/Procedures/Meds    Pertinent Labs Reviewed: reviewed  Pertinent Labs Comments: Na 132, K 3.3, Chl 92, Glu 113, Ca 8.5  Pertinent Medications Reviewed: reviewed  Pertinent Medications Comments: statin, docusate sodium, spironolactone    Estimated/Assessed Needs    Weight Used For Calorie Calculations: 89.5 kg (197 lb 5 oz)  Energy Calorie Requirements (kcal): 1845  Energy Need Method: Iaeger-St Jeor  Protein Requirements: 89-107g (1-1.2 g/kg)  Weight Used For Protein Calculations: 89.5 kg (197 lb 5 oz)  Fluid Requirements (mL): 1 mL/kcal or per MD  Estimated Fluid Requirement Method: RDA Method  RDA Method (mL): 1845     Nutrition Prescription Ordered    Current Diet Order: Regular    Evaluation of Received Nutrient/Fluid Intake    I/O: +1416 mL since admit  Energy Calories Required: not meeting needs  Protein Required: not meeting needs  Comments: LBM 1/10/22  % Intake of Estimated Energy Needs: 25 - 50 %  % Meal Intake: 25 - 50 %    Nutrition Risk    Level of Risk/Frequency of Follow-up:  (1x/week)     Monitor and Evaluation    Food and Nutrient Intake: energy intake,food and beverage intake  Food and Nutrient Adminstration: diet order  Knowledge/Beliefs/Attitudes: food and nutrition knowledge/skill  Physical Activity and Function: nutrition-related ADLs and IADLs  Anthropometric Measurements: weight,weight change,body mass index  Biochemical Data, Medical Tests and Procedures: electrolyte and renal panel,gastrointestinal profile,glucose/endocrine profile,inflammatory profile,lipid profile  Nutrition-Focused Physical Findings: overall appearance     Nutrition Follow-Up    RD Follow-up?: Yes   Follow-Up Date: 1/17/2022

## 2022-01-10 NOTE — PLAN OF CARE
Problem: Occupational Therapy Goal  Goal: Occupational Therapy Goal  Description: Goals to be met by: 2/1/22     Patient will increase functional independence with ADLs by performing:    UE Dressing with [place].  LE Dressing with [place].  Grooming while standing at sink with [place].  Toileting from toilet with [place] for hygiene and clothing management.   Bathing from  shower chair/bench with [place].  Toilet transfer to toilet with [place].  Increased functional strength to WFL for B UE.  Upper extremity exercise program x15 reps per handout, with independence.    Outcome: Ongoing, Progressing

## 2022-01-10 NOTE — PT/OT/SLP PROGRESS
"Physical Therapy Treatment    Patient Name:  Saba Lott   MRN:  [id number]    Recommendations:     Discharge Recommendations:  home health PT   Discharge Equipment Recommendations: none   Barriers to discharge: None    Assessment:     Saba Lott is a 56 y.o. male admitted with a medical diagnosis of Fever.  He presents with the following impairments/functional limitations:  weakness,impaired balance,impaired endurance,decreased lower extremity function,gait instability,impaired functional mobilty,impaired cardiopulmonary response to activity. The patient's mobility is primarily limited due to generalized weakness, impaired cardiopulmonary endurance. Patient found sitting edge of bed following OT session, required encouragement to participate with gait training due to reported fatigue. He ambulated 250' + 200' with one seated rest break and one standing rest break. He would benefit from HH therapy to address the above deficits.     Rehab Prognosis: Good; patient would benefit from acute skilled PT services to address these deficits and reach maximum level of function.    Recent Surgery: * No surgery found *      Plan:     During this hospitalization, patient to be seen 3 x/week to address the identified rehab impairments via gait training,therapeutic activities,therapeutic exercises and progress toward the following goals:    · Plan of Care Expires:  01/31/22    Subjective     Chief Complaint: "I just feel so weak, I was walking all over my neighborhood before"  Patient/Family Comments/goals: increase activity tolerance  Pain/Comfort:  · Pain Rating 1: 0/10      Objective:     Communicated with RN prior to session.  Patient found sitting EOB with OT with peripheral IV,LVAD upon PT entry to room.     General Precautions: Standard, fall,LVAD,contact   Orthopedic Precautions:N/A   Braces:  NA  Respiratory Status: Room air     Functional Mobility:    Bed Mobility  Found sitting up in bed  Sit to supine: stand by " "assistance    Transfers Sit to Stand:  minimum assistance with HHA to contact guard assist with cues for set up   Gait  Gait Distance: 250 + 200 ft with no AD  Assistance Level: stand by assistance  Description: wide SKYLER, increased lateral weight shift L>R, decreased weight shift to R LE in stance phase, decreased step length and hip extension ALEJANDRA, minimal foot clearance with shuffling gait    Required 1 standing rest break after 125', required seated rest break after 250' gait due to fatigue           Five Times Sit to Stand Test:  How long the patient takes to completed 5 sit to stand form chair with arms folded across chest: 26 seconds, contact guard assist from edge of bed, required definite use of upper extremities to assist in stand  "Inability to perform test in less than 13.6 seconds indicates increased disability and Morbidity." (Catarina 2000).  Normative values for community dwelling elderly:   · 60-70y/o: 11.4 seconds  · 70-80y/o: 12.6 seconds  · 80-88y/o: 14.8 seconds        AM-PAC 6 CLICK MOBILITY  Turning over in bed (including adjusting bedclothes, sheets and blankets)?: 4  Sitting down on and standing up from a chair with arms (e.g., wheelchair, bedside commode, etc.): 3  Moving from lying on back to sitting on the side of the bed?: 4  Moving to and from a bed to a chair (including a wheelchair)?: 4  Need to walk in hospital room?: 4       Therapeutic Activities and Exercises:   Patient educated on role of therapy, goals of session, benefits of out of bed mobility. Patient agreeable to mobilize with therapy.      Gait training: cued for activity pacing and energy conservation strategies, educated on importance of ambulating 2x day to increase activity tolerance and prevent functional decline    Patient educated on PT schedule.  Encouraged patient to ambulate, sit up in chair 3x/day to prevent deconditioning during hospitalization. Patient verbalized understanding and agreement not to mobilize " without RN assist. Patient in agreement with PT POC.      ·  LVAD:   ? Pt found with LVAD on wall power   ? Pt performed transition from LVAD wall power > battery power with min A for unscrewing battery lines, supervision for placement of lines within consolidation bag.   ? Pt educated on proper positioning of driveline when wearing consolidation bag.  ? Pt educated on importance of checking anchor daily- patient not wearing anchor  ? Pt left on wall power at end of therapy session   ? No alarms sounded during session      Patient left HOB elevated with all lines intact and call button in reach..    GOALS:   Multidisciplinary Problems     Physical Therapy Goals        Problem: Physical Therapy Goal    Goal Priority Disciplines Outcome Goal Variances Interventions   Physical Therapy Goal     PT, PT/OT Ongoing, Progressing     Description: Goals to be met by: 2022     Patient will increase functional independence with mobility by performin. Sit to stand transfer with Modified [place]  2. Bed to chair transfer with Supervision using LRAD  3. Gait  x 150 feet with Supervision using LRAD with no seated rest breaks and VSS.   4. Ascend/descend 4 stair with no Handrails Stand-by Assistnce.   5. Lower extremity exercise program x30 reps per handout, with independence                     Time Tracking:     PT Received On: 01/10/22  PT Start Time: 1100     PT Stop Time: 1130  PT Total Time (min): 30 min     Billable Minutes: Therapeutic Activity 30    Treatment Type: Treatment  PT/PTA: PT     PTA Visit Number: 0     01/10/2022

## 2022-01-10 NOTE — ASSESSMENT & PLAN NOTE
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 On admit presented with cough, nausea, vomiting, weakness, and temp of 103 F  - Admits to noncompliance with suppressive Doxy PTA  - Blood cultures 1/1 & 1/2 positive for Staph, repeats -ve and blood cx 1/7 with NGTD  - Abdominal culture 1/3 positive for pseudomonas  - T max down to 99.1  - ID following, vanc changed to dapto on 1/1 given hx of VISA/VRSA/MRSA. Cefepime changed to jacoby 1/2 and then jacoby changed to Zosyn 1/6 for Pseudomonas from epigastric wound cx done 1/3  - CT c/a/p on admit with left basilar ground-glass and airspace opacities with additional slight ground-glass attenuation in the left upper lobe.  Abdomen distended and tender on 1/9 - CT c/a/p repeated 1/9 and shows increased soft tissue thickening about the distal superior mesenteric vasculature with apparent vessel enlargement.  Engorged distal small vessel mesenteric vasculature and surrounding edema/stranding.  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  Differential also includes mesenteric panniculitis however felt less likely. Stable postoperative changes of LVAD placement.  Stable soft tissue induration about the drive line catheter within the ventral abdominal wall.  No focal fluid collection or abscess.

## 2022-01-10 NOTE — SUBJECTIVE & OBJECTIVE
Interval History:   No AEON.   AFebrile now and WBC improved from peak 26 now to 13  Repeat BCXs 1/7 NGTD  Wound cx (DLES) - Pan sensitive pseudomonas  Reports fever, chills and sweats now resolved.      Review of Systems   Constitutional: Negative for chills, diaphoresis, fatigue and fever.   Respiratory: Negative for cough and shortness of breath.    Cardiovascular: Negative for chest pain and leg swelling.   Gastrointestinal: Negative for abdominal pain, diarrhea, nausea and vomiting.   Genitourinary: Negative for difficulty urinating, dysuria, flank pain and hematuria.   Musculoskeletal: Negative for arthralgias, back pain, joint swelling, myalgias and neck pain.   Skin: Positive for wound (open wound epigastric area). Negative for rash.   Neurological: Negative for dizziness, weakness and headaches.   Psychiatric/Behavioral: Negative for agitation. The patient is not nervous/anxious.      Objective:     Vital Signs (Most Recent):  Temp: 98.5 °F (36.9 °C) (01/10/22 1145)  Pulse: 107 (01/10/22 1526)  Resp: 18 (01/10/22 1145)  BP: (!) 74/0 (01/10/22 1500)  SpO2: 96 % (01/10/22 1145) Vital Signs (24h Range):  Temp:  [98 °F (36.7 °C)-99.1 °F (37.3 °C)] 98.5 °F (36.9 °C)  Pulse:  [] 107  Resp:  [16-18] 18  SpO2:  [94 %-98 %] 96 %  BP: (70-82)/(0) 74/0     Weight: 88.8 kg (195 lb 12.3 oz)  Body mass index is 30.65 kg/m².    Estimated Creatinine Clearance: 86.7 mL/min (based on SCr of 1 mg/dL).    Physical Exam  Vitals and nursing note reviewed.   Constitutional:       General: He is not in acute distress.     Appearance: He is well-developed. He is obese. He is not ill-appearing, toxic-appearing or diaphoretic.       Cardiovascular:      Rate and Rhythm: Tachycardia present.      Comments: VAD hum  Pulmonary:      Effort: Pulmonary effort is normal. No respiratory distress.      Breath sounds: No wheezing or rales.   Abdominal:      General: There is no distension.      Palpations: Abdomen is soft.       Tenderness: There is no abdominal tenderness. There is no guarding.      Comments: Epigastric wound undressed - no active drainage.  No tenderness, erythema.  Surrounding induration.      DLES site not dressed; no drainage. No erythema   Musculoskeletal:         General: No swelling or tenderness. Normal range of motion.      Cervical back: Normal range of motion.   Skin:     General: Skin is warm and dry.      Findings: No erythema or rash.   Neurological:      Mental Status: He is alert and oriented to person, place, and time.   Psychiatric:         Behavior: Behavior normal.         Significant Labs:   Blood Culture:   Recent Labs   Lab 01/02/22  1946 01/03/22  1723 01/04/22  0420 01/04/22  0425 01/07/22  0604   LABBLOO Gram stain aer bottle: Gram positive cocci in clusters resembling Staph  Positive results previously called  METHICILLIN RESISTANT STAPHYLOCOCCUS AUREUS  ID consult required at [facility].  For susceptibility see order #[id number]  *  Gram stain uzma bottle: Gram positive cocci in clusters resembling Staph  Results called to and read back by:Khai Durand RN 01/04/2022  22:54  Gram stain aer bottle: Gram positive cocci   Positive results previously called  01/05/2022  METHICILLIN RESISTANT STAPHYLOCOCCUS AUREUS  ID consult required at [facility].  For susceptibility see order #[id number]  * No growth after 5 days.  No growth after 5 days. No growth after 5 days. No growth after 5 days. No Growth to date  No Growth to date  No Growth to date  No Growth to date  No Growth to date  No Growth to date  No Growth to date  No Growth to date     CBC:   Recent Labs   Lab 01/09/22  0517 01/10/22  0538   WBC 13.11* 13.51*   HGB 7.7* 7.4*   HCT 25.9* 25.7*   * 579*     CMP:   Recent Labs   Lab 01/09/22 0518 01/10/22  0538   * 133*   K 4.1 4.1    101   CO2 22* 24   GLU 92 101   BUN 8 6   CREATININE 1.1 1.0   CALCIUM  8.2* 8.5*   PROT  --  7.1   ALBUMIN  --  2.1*   BILITOT  --  0.6   ALKPHOS  --  70   AST  --  33   ALT  --  19   ANIONGAP 8 8   EGFRNONAA >60.0 >60.0     Wound Culture:   Recent Labs   Lab 07/23/21  1004 08/09/21  1119 08/09/21  1128 10/05/21  1732 01/03/22  1822   LABAERO METHICILLIN RESISTANT STAPHYLOCOCCUS AUREUS  Moderate  * No growth No growth METHICILLIN RESISTANT STAPHYLOCOCCUS AUREUS  Rare  * PSEUDOMONAS AERUGINOSA  Few  *       Significant Imaging: I have reviewed all pertinent imaging results/findings within the past 24 hours.

## 2022-01-10 NOTE — PLAN OF CARE
Recommendations     1. Continue Regular diet, fluid per MD   2. Add Boost Plus (vanilla) to aid in energy and protein intake   3. RD to monitor and follow     Goals: Pt to receive ONS by RD follow-up  Nutrition Goal Status: Goal not met  Communication of RD Recs: POC    Please see full assesement by the Dietetic Intern on 1/10.

## 2022-01-10 NOTE — PLAN OF CARE
Problem: Physical Therapy Goal  Goal: Physical Therapy Goal  Description: Goals to be met by: 2022     Patient will increase functional independence with mobility by performin. Sit to stand transfer with Modified Speer  2. Bed to chair transfer with Supervision using LRAD  3. Gait  x 150 feet with Supervision using LRAD with no seated rest breaks and VSS.   4. Ascend/descend 4 stair with no Handrails Stand-by Assistnce.   5. Lower extremity exercise program x30 reps per handout, with independence    Outcome: Ongoing, Progressing   Continue with plan of care.   Kayce Lebron, PT  1/10/2022

## 2022-01-10 NOTE — PROGRESS NOTES
UPDATE    SW to Pt's room for ongoing support & assessment of needs. Pt presents as AAO x4, calm, cooperative, and asking and answering questions appropriately. Pt reports feeling improved today but still tired. Pt declines lengthy chat with SW today. PT team arrived as SW was wrapping up visit - SW encouraged Pt to reach out as needs arise. SW providing ongoing psychosocial, counseling, & emotional support, education, resources, assistance, and discharge planning as indicated.  SW to continue to follow.

## 2022-01-10 NOTE — PROGRESS NOTES
01/10/2022  Bill DEVORAH Soto Jr    Current provider:  Zulma Floyd MD    TXP LVAD INTERROGATIONS 1/10/2022 1/10/2022 1/9/2022 1/9/2022 1/9/2022 1/9/2022 1/9/2022   Type HeartMate3 HeartMate3 HeartMate3 HeartMate3 HeartMate3 HeartMate3 HeartMate3   Flow 4.8 4.8 4.8 4.9 4.7 4.7 4.7   Speed 5300 5300 5300 5300 5300 5300 5300   PI 3.3 3.3 2.9 2.4 3.5 2.9 3.1   Power (Centeno) 3.8 3.8 3.8 3.8 3.8 3.8 3.8   LSL - - 4900 4900 4900 4900 4900   Pulsatility No Pulse No Pulse No Pulse - - - -          Rounded on Saba A Oren to ensure all mechanical assist device settings (IABP or VAD) were appropriate and all parameters were within limits.  I was able to ensure all back up equipment was present, the staff had no issues, and the Perfusion Department daily rounding was complete.    In emergency, the nursing units have been notified to contact the perfusion department either by:  Calling [phone] from 630am to 4pm Mon thru Fri, or by contacting the hospital  from 4pm to 630am and on weekends and asking to speak with the perfusionist on call.    7:57 AM

## 2022-01-10 NOTE — ASSESSMENT & PLAN NOTE
56-year-old male with history of DCM s/p LVAD 2/6/2020, left occipital IPH, lead endocarditis s/p removal, history of VISA and polymicrobial  bacteremia in October (pseudomonas, e coli, proteus.  Strongy negative) on chronic suppressive doxy, presents from clinic with fevers.    Blood cultures of 1/1 - 2/4 bottles + MRSA  Blood cultures 1/2 - 3/4 MRSA  Blood cultures  1/3, 1/4  - NGTD   Abdominal wound cx - + Pseudomonas - pan sensitive    CT A/P 1/2/22 - unchanged small region of soft tissue induration and slight skin thickening about the drive line in anterior abdominal wall. No definite focal fluid collection about the LVAD. No definite new regions of soft tissue induration or focal fluid collections are identified about the remaining subcutaneous course of the LVAD driveline  Noted patchy LLL GGO and airspace opacities with additional patchy ground glass attenuation in DEVORAH  Slight non-specific perinephric fat stranding - U/A wnl  Increased soft tissue thickening about the distal superior mesenteric vasculature with apparent vessel enlargement.  Engorged distal small vessel mesenteric vasculature and surrounding edema/stranding. 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  Differential also includes mesenteric panniculitis however felt less likely.     Leukocytosis improving (26K down to 13K). Now afebrile. Patient again states he feels better. COVID negative, RIP panel negative.    Pan sensitive pseudomonas on ABD wound cx but no active sign of infection at DLES and no cultures done  Concern that given MRSA bacteremia, may have endovascular infection if thrombus present in SMV    Plan:  1.   Continue IV daptomycin 10 mg/kg for now MRSA bacteremia. Confirmed susceptibility with Micro lab.  KALI <.5.   Weekly CK while on dapto  2.   DC zosyn and change to cefepime for pseudomonas from epigastric abdominal wound   3.   Follow repeat cultures  4.  Continue to monitor fever/wbc  trend  5.   Please obtain 2D echo   6. Plan discussed with ID staff and primary team  7. Will follow

## 2022-01-10 NOTE — PROGRESS NOTES
[facility] - [facility]iology Stepdown  Heart Transplant  Progress Note    Patient Name: Saba Lott  MRN: [id number]  Admission Date: 12/31/2021  Hospital Length of Stay: 10 days  Attending Physician: Zulma Floyd MD  Primary Care Provider: Mary Lombardi MD  Principal Problem:Fever    Subjective:     **Interval History: T max down to 99.1, WCC still 13k. Blood cxs 1/3 and 1/4 -ve, 1/7 with NGTD. Abdomen remains distended but is non tender. Stool loose since admit. No appetite. 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 Dapto and Zosyn continue. INR trending back up at 2.3 despite holding Coumadin.    Continuous Infusions:  Scheduled Meds:   albuterol-ipratropium  3 mL Nebulization Q4H    atorvastatin  20 mg Oral QHS    DAPTOmycin (CUBICIN)  IV  10 mg/kg Intravenous Q24H    docusate sodium  100 mg Oral Daily    Lactobacillus rhamnosus GG  1 capsule Oral Daily    magnesium oxide  400 mg Oral TID    mupirocin   Nasal BID    piperacillin-tazobactam (ZOSYN) IVPB  4.5 g Intravenous Q8H     PRN Meds:acetaminophen, aluminum & magnesium hydroxide-simethicone, benzonatate, diphenhydrAMINE, guaiFENesin 100 mg/5 ml, ondansetron    Review of patient's allergies indicates:   Allergen Reactions    Iodine and iodide containing products      Objective:     Vital Signs (Most Recent):  Temp: 98.3 °F (36.8 °C) (01/10/22 0813)  Pulse: 110 (01/10/22 0813)  Resp: 18 (01/10/22 0813)  BP: (!) 82/0 (01/10/22 0813)  SpO2: 95 % (01/10/22 0813) Vital Signs (24h Range):  Temp:  [98 °F (36.7 °C)-99.1 °F (37.3 °C)] 98.3 °F (36.8 °C)  Pulse:  [] 110  Resp:  [16-20] 18  SpO2:  [94 %-98 %] 95 %  BP: (70-82)/(0) 82/0     Patient Vitals for the past 72 hrs (Last 3 readings):   Weight   01/08/22 0842 88.8 kg (195 lb 12.3 oz)     Body mass index is 30.65 kg/m².      Intake/Output Summary (Last 24 hours) at 1/10/2022 0850  Last data filed at 1/9/2022 1700  Gross per 24 hour   Intake 684 ml   Output 300 ml   Net 384 ml        Hemodynamic Parameters:       Telemetry: ST with PVC's    Physical Exam  Constitutional:       Appearance: Normal appearance.   HENT:      Head: Normocephalic and atraumatic.   Eyes:      Conjunctiva/sclera: Conjunctivae normal.   Neck:      Comments: Neck veins are not elevated  Cardiovascular:      Rate and Rhythm: Regular rhythm. Tachycardia present.      Comments: Smooth VAD hum  Pulmonary:      Effort: Pulmonary effort is normal.      Breath sounds: Normal breath sounds.   Abdominal:      General: Bowel sounds are normal. There is distension.   Musculoskeletal:         General: No swelling. Normal range of motion.      Cervical back: Normal range of motion and neck supple.   Skin:     General: Skin is warm and dry.      Capillary Refill: Capillary refill takes 2 to 3 seconds.   Neurological:      General: No focal deficit present.      Mental Status: He is alert and oriented to person, place, and time.   Psychiatric:         Mood and Affect: Mood normal.         Behavior: Behavior normal.         Thought Content: Thought content normal.         Judgment: Judgment normal.         Significant Labs:  CBC:  Recent Labs   Lab 01/08/22  0541 01/09/22  0517 01/10/22  0538   WBC 13.81* 13.11* 13.51*   RBC 4.69 4.32* 4.17*   HGB 8.2* 7.7* 7.4*   HCT 28.5* 25.9* 25.7*   * 570* 579*   MCV 61* 60* 62*   MCH 17.5* 17.8* 17.7*   MCHC 28.8* 29.7* 28.8*     BNP:  Recent Labs   Lab 01/05/22  0507 01/07/22  0604 01/10/22  0539   * 1,483* 602*     CMP:  Recent Labs   Lab 01/05/22  0507 01/06/22  0459 01/07/22  0604 01/07/22  0604 01/08/22  0541 01/09/22  0518 01/10/22  0538      < > 134*   < > 94 92 101   CALCIUM 8.3*   < > 8.4*   < > 8.4* 8.2* 8.5*   ALBUMIN 2.3*  --  2.3*  --   --   --  2.1*   PROT 6.6  --  7.5  --   --   --  7.1   *   < > 131*   < > 129* 131* 133*   K 4.1   < > 4.8   < > 4.7 4.1 4.1   CO2 27   < > 22*   < > 18* 22* 24   CL 97   < > 100   < > 102 101 101   BUN 11   < > 8   < > 9  8 6   CREATININE 1.2   < > 1.3   < > 1.2 1.1 1.0   ALKPHOS 81  --  87  --   --   --  70   ALT 10  --  13  --   --   --  19   AST 17  --  20  --   --   --  33   BILITOT 0.6  --  0.8  --   --   --  0.6    < > = values in this interval not displayed.      Coagulation:   Recent Labs   Lab 01/08/22  0541 01/09/22  0517 01/10/22  0538   INR 1.5* 1.9* 2.3*   APTT 35.3* 37.7* 39.9*     LDH:  Recent Labs   Lab 01/08/22  0541 01/09/22  0517 01/09/22  0923 01/10/22  0538    506* 231 240     Microbiology:  Microbiology Results (last 7 days)     Procedure Component Value Units Date/Time    Culture, Anaerobe [[id number]] Collected: 01/03/22 1822    Order Status: Completed Specimen: Wound from Abdomen Updated: 01/10/22 0850     Anaerobic Culture No anaerobes isolated    Blood culture [[id number]] Collected: 01/07/22 0604    Order Status: Completed Specimen: Blood Updated: 01/09/22 1012     Blood Culture, Routine No Growth to date      No Growth to date      No Growth to date    Narrative:      From 2 different sites 30 minutes apart    Blood culture [[id number]] Collected: 01/07/22 0604    Order Status: Completed Specimen: Blood Updated: 01/09/22 1012     Blood Culture, Routine No Growth to date      No Growth to date      No Growth to date    Narrative:      From 2 different sites 30 minutes apart    Blood culture [[id number]] Collected: 01/04/22 0425    Order Status: Completed Specimen: Blood Updated: 01/09/22 1012     Blood Culture, Routine No growth after 5 days.    Blood culture [[id number]] Collected: 01/04/22 0420    Order Status: Completed Specimen: Blood Updated: 01/09/22 1012     Blood Culture, Routine No growth after 5 days.    Blood culture [[id number]] Collected: 01/03/22 1723    Order Status: Completed Specimen: Blood Updated: 01/08/22 2012     Blood Culture, Routine No growth after 5 days.    Blood culture [[id number]] Collected: 01/03/22 1723    Order Status: Completed Specimen: Blood Updated: 01/08/22 2012      Blood Culture, Routine No growth after 5 days.    Blood culture [[id number]]  (Abnormal) Collected: 01/02/22 1946    Order Status: Completed Specimen: Blood Updated: 01/08/22 1226     Blood Culture, Routine Gram stain aer bottle: Gram positive cocci in clusters resembling Staph      Positive results previously called      METHICILLIN RESISTANT STAPHYLOCOCCUS AUREUS  ID consult required at [facility].  For susceptibility see order #[id number]      Blood culture [[id number]]  (Abnormal) Collected: 01/02/22 1946    Order Status: Completed Specimen: Blood Updated: 01/08/22 1013     Blood Culture, Routine Gram stain uzma bottle: Gram positive cocci in clusters resembling Staph      Results called to and read back by:Khai Durand RN 01/04/2022  22:54      Gram stain aer bottle: Gram positive cocci       Positive results previously called  01/05/2022     Comment: Previous comment was modified by NEG NEG at 11:38 on 01/05/2022 Gram   stain uzma bottle: Gram positive cocci in clusters resembling   StaphResults called to and read back by:Khai Durand RN 01/04/2022 22:54           METHICILLIN RESISTANT STAPHYLOCOCCUS AUREUS  ID consult required at [facility].  For susceptibility see order #[id number]      Clostridium difficile EIA [[id number]]     Order Status: Canceled Specimen: Stool     Blood culture [[id number]]  (Abnormal)  (Susceptibility) Collected: 01/01/22 0020    Order Status: Completed Specimen: Blood Updated: 01/06/22 1141     Blood Culture, Routine Gram stain aer bottle: Gram positive cocci       Results called to and read back by: DANIEL BLANCHARD RN  01/02/2022  22:36      METHICILLIN RESISTANT STAPHYLOCOCCUS AUREUS  ID consult required at [facility].       Comment: Previous comment was modified by RHONA MELGOZA at 10:51 on 01/06/2022 ID   consult required at [facility].ID   consult required at [facility]  [facility].         Aerobic culture [[id number]]  (Abnormal)  (Susceptibility) Collected: 01/03/22 1822    Order Status: Completed Specimen: Wound from Abdomen Updated: 01/06/22 1126     Aerobic Bacterial Culture PSEUDOMONAS AERUGINOSA  Few      Blood culture [[id number]]  (Abnormal)  (Susceptibility) Collected: 01/01/22 0020    Order Status: Completed Specimen: Blood Updated: 01/06/22 1052     Blood Culture, Routine Gram stain aer bottle: Gram positive cocci in clusters resembling Staph       Results called to and read back by:DANIEL BLANCHARD 01/02/2022  23:29      METHICILLIN RESISTANT STAPHYLOCOCCUS AUREUS  ID consult required at [facility].  ID consult required at [facility].      Culture, Respiratory with Gram Stain [[id number]]     Order Status: No result Specimen: Respiratory from Sputum, Expectorated     Respiratory Infection Panel (PCR), Nasopharyngeal [[id number]] Collected: 01/03/22 1822    Order Status: Completed Specimen: Nasopharyngeal Swab Updated: 01/03/22 2107     Respiratory Infection Panel Source NP Swab     Adenovirus Not Detected     Coronavirus 229E, Common Cold Virus Not Detected     Coronavirus HKU1, Common Cold Virus Not Detected     Coronavirus NL63, Common Cold Virus Not Detected     Coronavirus OC43, Common Cold Virus Not Detected     Comment: The Coronavirus strains detected in this test cause the common cold.  These strains are not the COVID-19 (novel Coronavirus)strain   associated with the respiratory disease outbreak.          Human Metapneumovirus Not Detected     Human Rhinovirus/Enterovirus Not Detected     Influenza A (subtypes H1, H1-2009,H3) Not Detected     Influenza B Not Detected     Parainfluenza Virus 1 Not Detected     Parainfluenza Virus 2 Not Detected     Parainfluenza Virus 3 Not Detected     Parainfluenza Virus 4 Not Detected     Respiratory Syncytial Virus Not Detected     Bordetella  Parapertussis (RD1349) Not Detected     Bordetella pertussis (ptxP) Not Detected     Chlamydia pneumoniae Not Detected     Mycoplasma pneumoniae Not Detected    Narrative:      For all other respiratory sources, order [id number] -  Respiratory Viral Panel by PCR          I have reviewed all pertinent labs within the past 24 hours.    Estimated Creatinine Clearance: 86.7 mL/min (based on SCr of 1 mg/dL).    Diagnostic Results:  I have reviewed all pertinent imaging results/findings within the past 24 hours.    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 Patient is short of breath and uncomfortable at the time of giving history. He reports chills, weakness, cough, sputum production, weight gain, nausea, vomiting and trouble breathing. He went to his PCP office yesterday where his temp was 103 and was sent to the hospital afterwards. His white count is 20 on arrival. He is unable to lay flat. His HR is 102-142 sinus. He needs to be diuresed upon arrival but K is 2.9, ordered IV and PO Kcl, he vomited out oral potassium, IV replacement will be done.       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 On admit presented with cough, nausea, vomiting, weakness, and temp of 103 F  - Admits to noncompliance with suppressive Doxy PTA  - Blood cultures 1/1 & 1/2 positive for Staph, repeats -ve and blood cx 1/7 with NGTD  - Abdominal culture 1/3 positive for pseudomonas  - T max down to 99.1  - ID following, vanc changed to dapto on 1/1 given hx of VISA/VRSA/MRSA. Cefepime changed to jacoby 1/2 and then jacoby changed to Zosyn 1/6 for  Pseudomonas from epigastric wound cx done 1/3  - CT c/a/p on admit with left basilar ground-glass and airspace opacities with additional slight ground-glass attenuation in the left upper lobe.  Abdomen distended and tender on 1/9 - CT c/a/p repeated 1/9 and shows increased soft tissue thickening about the distal superior mesenteric vasculature with apparent vessel enlargement.  Engorged distal small vessel mesenteric vasculature and surrounding edema/stranding.  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  Differential also includes mesenteric panniculitis however felt less likely. Stable postoperative changes of LVAD placement.  Stable soft tissue induration about the drive line catheter within the ventral abdominal wall.  No focal fluid collection or abscess.    Sepsis  -See fever    LVAD (left ventricular assist device) present  -S/P DT HM3 with MVR 2/2/20  -Current speed 5300  -INR goal 1.5-2.0 (hx of ICH). INR trending back up at 2.3 despite holding Coumadin (got Vit K 1 mg IV on 1/6 for INR 3.0)  -LDH stable  -ECHO 10/9/21 with EF 20%, BRYAN, IVS bows into RV, G1DD, s/p Alferi stitch, mild-moderate RVSF, CVP 3, LVEDD 6.43 with LVAD speed set to 5300 rpm      Procedure: Device Interrogation Including analysis of device parameters  Current Settings: Ventricular Assist Device  Review of device function is stable      TXP LVAD INTERROGATIONS 1/10/2022 1/10/2022 1/10/2022 1/9/2022 1/9/2022 1/9/2022 1/9/2022   Type HeartMate3 HeartMate3 HeartMate3 HeartMate3 HeartMate3 HeartMate3 HeartMate3   Flow 4.8 4.8 4.8 4.8 4.9 4.7 4.7   Speed 5300 5300 5300 5300 5300 5300 5300   PI 3.4 3.3 3.3 2.9 2.4 3.5 2.9   Power (Centeno) 3.9 3.8 3.8 3.8 3.8 3.8 3.8   LSL 4900 - - 4900 4900 4900 4900   Pulsatility No Pulse No Pulse No Pulse No Pulse - - [place] [id number]  Heart Transplant  [facility] - Cardiology Stepdown

## 2022-01-10 NOTE — PT/OT/SLP PROGRESS
"Occupational Therapy   Treatment    Name: Saba Lott  MRN: [id number]  Admitting Diagnosis:  Fever      Pre-op Diagnosis: * No surgery found *    Recommendations:     Discharge Recommendations: home health OT  Discharge Equipment Recommendations:  none  Barriers to discharge:  None    Assessment:     Saba Lott is a 56 y.o. male with a medical diagnosis of Fever.  He presents with the following performance deficits affecting function are weakness,impaired endurance,impaired self care skills,impaired functional mobilty,impaired cardiopulmonary response to activity. Patient agreeable to OT session with selective participation secondary to fatigue. Patient completed functional mobility from bedside to/from bathroom with SBA and min cues for line management and decreased safety awareness to complete toileting tasks. Patient did demo SOB post toileting tasks requiring short standing rest break prior to completing hand hygiene sink side. Patient continues to progress towards goals. Patient would benefit from continued OT services to address deficits and progress towards goals. Continue OT POC.    Rehab Prognosis:  Good; patient would benefit from acute skilled OT services to address these deficits and reach maximum level of function.       Plan:     Patient to be seen 3 x/week to address the above listed problems via self-care/home management,therapeutic activities,therapeutic exercises  · Plan of Care Expires: 02/01/22  · Plan of Care Reviewed with: patient    Subjective   "I do get winded."    Pain/Comfort:  · Pain Rating 1: 0/10    Objective:     Communicated with: RN and OTR prior to session.  Patient found left sidelying with peripheral IV,LVAD,telemetry upon OT entry to room.  A client care conference was completed by the OTR and the OG prior to treatment by the OG to discuss the patient's POC and current status.    General Precautions: Standard, fall,LVAD,contact   Orthopedic Precautions:N/A   Braces: " N/A  Respiratory Status: Room air     Occupational Performance:     Bed Mobility:    · Patient completed Scooting/Bridging with modified independence  · Patient completed Supine to Sit with supervision and min cues for line management/safety during transitional movements     Functional Mobility/Transfers:  · Patient completed Sit <> Stand Transfer with supervision  with  no assistive device   · Patient completed Toilet Transfer Step Transfer technique with stand by assistance with  no AD and grab bars  · Functional Mobility: Patient completed functional mobility bedside<>bathroom using no AD with Supervision, requiring min cues for decreased safety awareness when management lines/LVAD.     Activities of Daily Living:  · Grooming: stand by assistance for decrease endurance to comlete hand hygiene standing sink side; SOB noted  · Toileting: supervision for safety to complete posterior pericare and gown management     LVAD   Patient found with LVAD on wall power.      [facility] 6 Click ADL: 19    Treatment & Education:  Instruction and facilitation in safe transfer techniques and functional mobility including fall prevention and proper body mechanics  ADL re-training with focus in maximizing safety awareness and activity tolerance  Addressed all patient questions/concerns within KUSHAL scope of practice      Patient left seated EOB for hand off to PT with all lines intact and call button in reachEducation:      GOALS:   Multidisciplinary Problems     Occupational Therapy Goals        Problem: Occupational Therapy Goal    Goal Priority Disciplines Outcome Interventions   Occupational Therapy Goal     OT, PT/OT Ongoing, Progressing    Description: Goals to be met by: 2/1/22     Patient will increase functional independence with ADLs by performing:    UE Dressing with Lewis.  LE Dressing with Lewis.  Grooming while standing at sink with Lewis.  Toileting from toilet with Lewis for hygiene and clothing  management.   Bathing from  shower chair/bench with Bladen.  Toilet transfer to toilet with Bladen.  Increased functional strength to WFL for B UE.  Upper extremity exercise program x15 reps per handout, with independence.                     Time Tracking:     OT Date of Treatment: 01/10/22  OT Start Time: 1045  OT Stop Time: 1101  OT Total Time (min): 16 min    Billable Minutes:Self Care/Home Management 16    OT/KUSHAL: KUSHAL     KUSHAL Visit Number: 1    1/10/2022

## 2022-01-10 NOTE — CARE UPDATE
RAPID RESPONSE NURSE ROUND       Rounding completed with charge RN, Claudette. No concerns verbalized at this time. Instructed to call [phone] for further concerns or assistance.

## 2022-01-10 NOTE — PROGRESS NOTES
[facility] - Cardiology Stepdown  Infectious Disease  Progress Note    Patient Name: Saba Lott  MRN: [id number]  Admission Date: 12/31/2021  Length of Stay: 10 days  Attending Physician: Zulma Floyd MD  Primary Care Provider: Mary Lombardi MD    Isolation Status: Contact  Assessment/Plan:      * Fever  56-year-old male with history of DCM s/p LVAD 2/6/2020, left occipital IPH, lead endocarditis s/p removal, history of VISA and polymicrobial  bacteremia in October (pseudomonas, e coli, proteus.  Strongy negative) on chronic suppressive doxy, presents from clinic with fevers.    Blood cultures of 1/1 - 2/4 bottles + MRSA  Blood cultures 1/2 - 3/4 MRSA  Blood cultures  1/3, 1/4  - NGTD   Abdominal wound cx - + Pseudomonas - pan sensitive    CT A/P 1/2/22 - unchanged small region of soft tissue induration and slight skin thickening about the drive line in anterior abdominal wall. No definite focal fluid collection about the LVAD. No definite new regions of soft tissue induration or focal fluid collections are identified about the remaining subcutaneous course of the LVAD driveline  Noted patchy LLL GGO and airspace opacities with additional patchy ground glass attenuation in DEVORAH  Slight non-specific perinephric fat stranding - U/A wnl  Increased soft tissue thickening about the distal superior mesenteric vasculature with apparent vessel enlargement.  Engorged distal small vessel mesenteric vasculature and surrounding edema/stranding. 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  Differential also includes mesenteric panniculitis however felt less likely.     Leukocytosis improving (26K down to 13K). Now afebrile. Patient again states he feels better. COVID negative, RIP panel negative.    Pan sensitive pseudomonas on ABD wound cx but no active sign of infection at DLES and no cultures done  Concern that given MRSA bacteremia, may have endovascular infection if thrombus present  in SMV    Plan:  1.   Continue IV daptomycin 10 mg/kg for now MRSA bacteremia. Confirmed susceptibility with Micro lab.  KALI <.5.   Weekly CK while on dapto  2.   DC zosyn and change to cefepime for pseudomonas from epigastric abdominal wound   3.   Follow repeat cultures  4.  Continue to monitor fever/wbc trend  5.   Please obtain 2D echo   6. Plan discussed with ID staff and primary team  7. Will follow      MRSA bacteremia  See assessment/plan above              Anticipated Disposition: tbd    Thank you for your consult. I will follow-up with patient. Please contact us if you have any additional questions.    CONG Dowell  Infectious Disease  [facility] - Cardiology Stepdown    Subjective:     Principal Problem:Fever    HPI: Mr. 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 [place] concern for [place] found to have polymicrobial GNR bacteremia s/p approx 6 wk zosyn (stopped 11/16) presented with with fever from clinic and was admitted for presumed sepsis. Pt reports feeling weak since day prior, fevers, chills, diarrhea and vomiting x 1. Denies sore throat, HA, abdominal pain, dysuria, sob or cough. No sick contacts. No increased drainage from DLES. Lives alone.    In the ED pt febrile, tachycardic and with WBC 20. CXR with b/l edema.           Interval History:   No AEON.   AFebrile now and WBC improved from peak 26 now to 13  Repeat BCXs 1/7 NGTD  Wound cx (epigastric wound) - Pan sensitive pseudomonas  Reports fever, chills and sweats now resolved.      Review of Systems   Constitutional: Negative for chills, diaphoresis, fatigue and fever.   Respiratory: Negative for cough and shortness of breath.    Cardiovascular: Negative for chest pain and  leg swelling.   Gastrointestinal: Negative for abdominal pain, diarrhea, nausea and vomiting.   Genitourinary: Negative for difficulty urinating, dysuria, flank pain and hematuria.   Musculoskeletal: Negative for arthralgias, back pain, joint swelling, myalgias and neck pain.   Skin: Positive for wound (open wound epigastric area). Negative for rash.   Neurological: Negative for dizziness, weakness and headaches.   Psychiatric/Behavioral: Negative for agitation. The patient is not nervous/anxious.      Objective:     Vital Signs (Most Recent):  Temp: 98.5 °F (36.9 °C) (01/10/22 1145)  Pulse: 107 (01/10/22 1526)  Resp: 18 (01/10/22 1145)  BP: (!) 74/0 (01/10/22 1500)  SpO2: 96 % (01/10/22 1145) Vital Signs (24h Range):  Temp:  [98 °F (36.7 °C)-99.1 °F (37.3 °C)] 98.5 °F (36.9 °C)  Pulse:  [] 107  Resp:  [16-18] 18  SpO2:  [94 %-98 %] 96 %  BP: (70-82)/(0) 74/0     Weight: 88.8 kg (195 lb 12.3 oz)  Body mass index is 30.65 kg/m².    Estimated Creatinine Clearance: 86.7 mL/min (based on SCr of 1 mg/dL).    Physical Exam  Vitals and nursing note reviewed.   Constitutional:       General: He is not in acute distress.     Appearance: He is well-developed. He is obese. He is not ill-appearing, toxic-appearing or diaphoretic.       Cardiovascular:      Rate and Rhythm: Tachycardia present.      Comments: VAD hum  Pulmonary:      Effort: Pulmonary effort is normal. No respiratory distress.      Breath sounds: No wheezing or rales.   Abdominal:      General: There is no distension.      Palpations: Abdomen is soft.      Tenderness: There is no abdominal tenderness. There is no guarding.      Comments: Epigastric wound undressed - no active drainage.  No tenderness, erythema.  Surrounding induration.      DLES site not dressed; no drainage. No erythema   Musculoskeletal:         General: No swelling or tenderness. Normal range of motion.      Cervical back: Normal range of motion.   Skin:     General: Skin is warm and  dry.      Findings: No erythema or rash.   Neurological:      Mental Status: He is alert and oriented to person, place, and time.   Psychiatric:         Behavior: Behavior normal.         Significant Labs:   Blood Culture:   Recent Labs   Lab 01/02/22  1946 01/03/22  1723 01/04/22  0420 01/04/22  0425 01/07/22  0604   LABBLOO Gram stain aer bottle: Gram positive cocci in clusters resembling Staph  Positive results previously called  METHICILLIN RESISTANT STAPHYLOCOCCUS AUREUS  ID consult required at [facility].  For susceptibility see order #[id number]  *  Gram stain uzma bottle: Gram positive cocci in clusters resembling Staph  Results called to and read back by:Khai Durand RN 01/04/2022  22:54  Gram stain aer bottle: Gram positive cocci   Positive results previously called  01/05/2022  METHICILLIN RESISTANT STAPHYLOCOCCUS AUREUS  ID consult required at [facility].  For susceptibility see order #[id number]  * No growth after 5 days.  No growth after 5 days. No growth after 5 days. No growth after 5 days. No Growth to date  No Growth to date  No Growth to date  No Growth to date  No Growth to date  No Growth to date  No Growth to date  No Growth to date     CBC:   Recent Labs   Lab 01/09/22  0517 01/10/22  0538   WBC 13.11* 13.51*   HGB 7.7* 7.4*   HCT 25.9* 25.7*   * 579*     CMP:   Recent Labs   Lab 01/09/22  0518 01/10/22  0538   * 133*   K 4.1 4.1    101   CO2 22* 24   GLU 92 101   BUN 8 6   CREATININE 1.1 1.0   CALCIUM 8.2* 8.5*   PROT  --  7.1   ALBUMIN  --  2.1*   BILITOT  --  0.6   ALKPHOS  --  70   AST  --  33   ALT  --  19   ANIONGAP 8 8   EGFRNONAA >60.0 >60.0     Wound Culture:   Recent Labs   Lab 07/23/21  1004 08/09/21  1119 08/09/21  1128 10/05/21  1732 01/03/22  1822   LABAERO METHICILLIN RESISTANT STAPHYLOCOCCUS AUREUS  Moderate  * No growth No growth METHICILLIN RESISTANT STAPHYLOCOCCUS AUREUS  Rare  *  PSEUDOMONAS AERUGINOSA  Few  *       Significant Imaging: I have reviewed all pertinent imaging results/findings within the past 24 hours.

## 2022-01-11 NOTE — SUBJECTIVE & OBJECTIVE
Interval History:   No AEON.   AFebrile now and WBC improved from peak 26 now to 16  Repeat BCXs 1/7 NGTD  Wound cx epigastric wound - Pan sensitive pseudomonas - patient reports now healed  Reports fever, chills and sweats now resolved.  Denies abdominal pain      Review of Systems   Constitutional: Negative for chills, diaphoresis, fatigue and fever.   Respiratory: Negative for cough and shortness of breath.    Cardiovascular: Negative for chest pain and leg swelling.   Gastrointestinal: Negative for abdominal pain, diarrhea, nausea and vomiting.   Genitourinary: Negative for difficulty urinating, dysuria, flank pain and hematuria.   Musculoskeletal: Negative for arthralgias, back pain, joint swelling, myalgias and neck pain.   Skin: Positive for wound (open wound epigastric area now healed). Negative for rash.   Neurological: Negative for dizziness, weakness and headaches.   Psychiatric/Behavioral: Negative for agitation. The patient is not nervous/anxious.      Objective:     Vital Signs (Most Recent):  Temp: 97.6 °F (36.4 °C) (01/11/22 1544)  Pulse: (!) 113 (01/11/22 1646)  Resp: 18 (01/11/22 1632)  BP: (!) 80/0 (01/11/22 1544)  SpO2: 100 % (01/11/22 1632) Vital Signs (24h Range):  Temp:  [97.6 °F (36.4 °C)-99 °F (37.2 °C)] 97.6 °F (36.4 °C)  Pulse:  [] 113  Resp:  [15-20] 18  SpO2:  [94 %-100 %] 100 %  BP: ()/(0-55) 80/0     Weight: 88.9 kg (195 lb 15.8 oz)  Body mass index is 30.7 kg/m².    Estimated Creatinine Clearance: 96.3 mL/min (based on SCr of 0.9 mg/dL).    Physical Exam  Vitals and nursing note reviewed.   Constitutional:       General: He is not in acute distress.     Appearance: He is well-developed. He is obese. He is not ill-appearing, toxic-appearing or diaphoretic.       Cardiovascular:      Rate and Rhythm: Tachycardia present.      Comments: VAD hum  Pulmonary:      Effort: Pulmonary effort is normal. No respiratory distress.      Breath sounds: No wheezing or rales.   Abdominal:       General: There is no distension.      Palpations: Abdomen is soft.      Tenderness: There is no abdominal tenderness. There is no guarding.      Comments: Epigastric wound undressed - no active drainage.  No tenderness, erythema.  Surrounding induration.      DLES site not dressed; no drainage. No erythema   Musculoskeletal:         General: No swelling or tenderness. Normal range of motion.      Cervical back: Normal range of motion.   Skin:     General: Skin is warm and dry.      Findings: No erythema or rash.   Neurological:      Mental Status: He is alert and oriented to person, place, and time.   Psychiatric:         Behavior: Behavior normal.             Significant Labs:   Blood Culture:   Recent Labs   Lab 01/02/22  1946 01/03/22  1723 01/04/22  0420 01/04/22  0425 01/07/22  0604   LABBLOO Gram stain aer bottle: Gram positive cocci in clusters resembling Staph  Positive results previously called  METHICILLIN RESISTANT STAPHYLOCOCCUS AUREUS  ID consult required at [facility].  For susceptibility see order #[id number]  *  Gram stain uzma bottle: Gram positive cocci in clusters resembling Staph  Results called to and read back by:Khai Durand RN 01/04/2022  22:54  Gram stain aer bottle: Gram positive cocci   Positive results previously called  01/05/2022  METHICILLIN RESISTANT STAPHYLOCOCCUS AUREUS  ID consult required at [facility].  For susceptibility see order #[id number]  * No growth after 5 days.  No growth after 5 days. No growth after 5 days. No growth after 5 days. No Growth to date  No Growth to date  No Growth to date  No Growth to date  No Growth to date  No Growth to date  No Growth to date  No Growth to date  No Growth to date  No Growth to date     CBC:   Recent Labs   Lab 01/10/22  0538 01/11/22  1031   WBC 13.51* 16.01*   HGB 7.4* 7.2*   HCT 25.7* 24.8*   * 553*     CMP:   Recent Labs   Lab 01/10/22  0538  01/11/22  0531   * 132*   K 4.1 4.2    100   CO2 24 22*    90   BUN 6 6   CREATININE 1.0 0.9   CALCIUM 8.5* 8.1*   PROT 7.1  --    ALBUMIN 2.1*  --    BILITOT 0.6  --    ALKPHOS 70  --    AST 33  --    ALT 19  --    ANIONGAP 8 10   EGFRNONAA >60.0 >60.0     Wound Culture:   Recent Labs   Lab 07/23/21  1004 08/09/21  1119 08/09/21  1128 10/05/21  1732 01/03/22  1822   LABAERO METHICILLIN RESISTANT STAPHYLOCOCCUS AUREUS  Moderate  * No growth No growth METHICILLIN RESISTANT STAPHYLOCOCCUS AUREUS  Rare  * PSEUDOMONAS AERUGINOSA  Few  *       Significant Imaging: I have reviewed all pertinent imaging results/findings within the past 24 hours.   Transthoracic echo (TTE) complete  Order# [id number]  Reading physician: Roman Varghese MD Ordering physician: Mallika Lugo NP Study date: 1/11/22       Reason for Exam  Priority: Routine  Dx: Complication involving left ventricular assist device (LVAD) [T82.9XXA (ICD-10-CM)]     Result Image Hyperlink     Show images for Echo    Summary    · LVIDd 6.60 cm  · The left ventricle is moderately enlarged with eccentric hypertrophy and severely decreased systolic function.  · There is an LVAD present. Base speed is 5300 RPMs. The pump type is a Heartmate III. The interventricular septum appears midline. The aortic valve opens intermittently.  · Severe left atrial enlargement.  · The estimated ejection fraction is 25%.  · Left ventricular diastolic dysfunction.  · Tachycardia was present  · Normal right ventricular size.  · Right ventricle not well visualized due to poor acoustic window.  · Poor endocardial visualization  · Mild tricuspid regurgitation.  · Elevated central venous pressure (15 mmHg).  · The estimated PA systolic pressure is 46 mmHg.  · There has been interval degeneration of the mitral valve in comparison with prior study, suspect partial flail posterior leaflet, cannot exclude significant mitral regurgitation.          CT Chest Abdomen Pelvis  Without Contrast (XPD) [[id number]] (Abnormal) Resulted: 01/09/22 1113   Order Status: Completed Updated: 01/09/22 1115   Narrative:     EXAMINATION:   CT CHEST ABDOMEN PELVIS WITHOUT CONTRAST(XPD)     CLINICAL HISTORY:   LVAD pt, DLES infection;     TECHNIQUE:   Low dose axial images, sagittal and coronal reformations were obtained from the thoracic inlet to the pubic synthesis without contrast     COMPARISON:   CT 01/02/2022     FINDINGS:   Base of neck structures in thoracic soft tissues are within normal limits.     Postoperative changes of LVAD placement.  Unable to assess cannula patency on noncontrast exam.  No evidence of focal fluid collection or abscess.  Stable subcutaneous soft tissue induration about the drive line catheter course within the midline ventral abdominal wall (series 3, image 60).  No detrimental interval change.     Heart is stable in size.  No pericardial effusion.  Thoracic aorta is normal in caliber.     Stable mildly prominent mediastinal lymph nodes.  No new adenopathy.     Prominence of the bilateral hilar vasculature and peribronchial thickening.  Mild interlobular septal thickening within the bilateral upper lobes with patchy ground-glass density.  Findings are suggestive for mild pulmonary edema.  No focal alveolar consolidation.  No significant pleural effusion or pneumothorax.     Liver is normal in size and attenuation.  No focal hepatic lesion detected noting decreased sensitivity on noncontrast exam.  Gallbladder is contracted and contains a calcified stone.  No biliary ductal dilatation.     Spleen is upper limits of normal in size.     Adrenal glands and pancreas are unremarkable.     Stable lobular contour of the right kidney which may represent cortical scarring.  No renal stone.  No hydroureteronephrosis.  Urinary bladder is decompressed.     Small and large bowel are normal caliber.  No evidence of bowel obstruction or inflammation.  Colonic diverticulosis.     Increased  soft tissue thickening about the distal superior mesenteric vascular with apparent vessel enlargement (series 3, image 92).  Engorged distal small vessel mesenteric vasculature and surrounding edema/stranding.  Limited evaluation given lack of intravenous contrast.     Normal caliber abdominal aorta.     Fat containing umbilical hernia.     Status post intramedullary damaris fixation of the bilateral femurs.  Degenerative changes of the osseous structures.  Stable peripherally sclerotic lucent lesion within the left iliac bone.  Chronic right rib fractures.  No acute fracture detected.  Sternotomy wires noted.    Impression:       1. Increased soft tissue thickening about the distal superior mesenteric vasculature with apparent vessel enlargement.  Engorged distal small vessel mesenteric vasculature and surrounding edema/stranding.  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  Differential also includes mesenteric panniculitis however felt less likely.   2. Stable postoperative changes of LVAD placement.  Stable soft tissue induration about the drive line catheter within the ventral abdominal wall.  No focal fluid collection or abscess.   3. Mild bilateral interlobular septal thickening and ground-glass density suggestive for mild pulmonary edema.   4. Cholelithiasis.   5. Additional findings as above.   This report was flagged in Epic as abnormal.       Electronically signed by: Jimenez Gardner   Date: 01/09/2022   Time: 11:13   X-Ray Chest AP Portable [[id number]] Resulted: 01/05/22 1320   Order Status: Completed Updated: 01/05/22 1322   Narrative:     EXAMINATION:   XR CHEST AP PORTABLE     CLINICAL HISTORY:   cough;     TECHNIQUE:   Single frontal view of the chest was performed.     COMPARISON:   01/03/2022     FINDINGS:   Stable mild cardiomegaly.     Stable mild pulmonary venous congestion.     The previously depicted lines, tubes, and postsurgical changes appear similar.  The  latter include a continuous flow left ventricular assistant device.    Impression:       No significant interval change since prior exam noted.       Electronically signed by: Christine Dotson   Date: 01/05/2022   Time: 13:20       Imaging History    2022  Date Procedure Name Status Accession Number Location   01/09/22 10:36 AM CT Chest Abdomen Pelvis Without Contrast (XPD) Final [id number] [facility]   01/05/22 12:58 PM X-Ray Chest AP Portable Final [id number] [facility]   01/03/22 05:17 PM X-Ray Chest AP Portable Final [id number] [facility]   01/02/22 08:51 PM CT Chest Abdomen Pelvis Without Contrast (XPD) Final [id number] [facility]   01/02/22 06:54 PM X-Ray Chest AP Portable Final [id number] [facility]   01/01/22 12:06 AM X-Ray Chest 1 View Final [id number] [facility]   01/11/22 09:28 AM Echo Final [id number] [facility]   2021  Date Procedure Name Status Accession Number Location   12/31/21 12:00 AM CARDIAC MONITORING STRIPS Final

## 2022-01-11 NOTE — SUBJECTIVE & OBJECTIVE
**Interval History: Afebrile past 24 hours, CBC pending. He continues to repeatedly remove LVAD dressings to pick and scratch at DLES. No drainage, rash or erythema noted at DLES. Blood cxs 1/4 -ve and 1/7 with NGTD. Zosyn change to Cefepime by ID yesterday. Dapto continues. Abdomen remains distended but is soft and non tender. Per Int Cards, no intervention can be done for SMV thrombosis. INR is down to 2.1 (goal 1.5-2.0)    Continuous Infusions:  Scheduled Meds:   albuterol-ipratropium  3 mL Nebulization Q4H    atorvastatin  20 mg Oral QHS    ceFEPime (MAXIPIME) IVPB EXTENDED INFUSION  2 g Intravenous Q8H    DAPTOmycin (CUBICIN)  IV  10 mg/kg Intravenous Q24H    docusate sodium  100 mg Oral Daily    Lactobacillus rhamnosus GG  1 capsule Oral Daily    magnesium oxide  800 mg Oral TID     PRN Meds:acetaminophen, aluminum & magnesium hydroxide-simethicone, benzonatate, diphenhydrAMINE, guaiFENesin 100 mg/5 ml, ondansetron    Review of patient's allergies indicates:   Allergen Reactions    Iodine and iodide containing products      Objective:     Vital Signs (Most Recent):  Temp: 98.4 °F (36.9 °C) (01/11/22 0749)  Pulse: (!) 113 (01/11/22 1056)  Resp: 18 (01/11/22 0719)  BP: (!) 118/55 (01/11/22 0749)  SpO2: 99 % (01/11/22 0749) Vital Signs (24h Range):  Temp:  [98 °F (36.7 °C)-98.8 °F (37.1 °C)] 98.4 °F (36.9 °C)  Pulse:  [] 113  Resp:  [16-18] 18  SpO2:  [93 %-99 %] 99 %  BP: ()/(0-55) 118/55     Patient Vitals for the past 72 hrs (Last 3 readings):   Weight   01/11/22 0749 88.9 kg (195 lb 15.8 oz)   01/10/22 1000 88.8 kg (195 lb 12.3 oz)   01/10/22 0714 88 kg (194 lb 0.1 oz)     Body mass index is 30.7 kg/m².      Intake/Output Summary (Last 24 hours) at 1/11/2022 1100  Last data filed at 1/11/2022 0600  Gross per 24 hour   Intake 702 ml   Output 200 ml   Net 502 ml       Hemodynamic Parameters:       Telemetry: ST with PVC's    Physical Exam  Constitutional:       Appearance: Normal appearance.    HENT:      Head: Normocephalic and atraumatic.   Eyes:      Conjunctiva/sclera: Conjunctivae normal.   Neck:      Comments: Neck veins are not elevated  Cardiovascular:      Rate and Rhythm: Regular rhythm. Tachycardia present.      Comments: Smooth VAD hum  Pulmonary:      Effort: Pulmonary effort is normal.      Breath sounds: Normal breath sounds.   Abdominal:      General: Bowel sounds are normal. There is distension.   Musculoskeletal:         General: No swelling. Normal range of motion.      Cervical back: Normal range of motion and neck supple.   Skin:     General: Skin is warm and dry.      Capillary Refill: Capillary refill takes 2 to 3 seconds.   Neurological:      General: No focal deficit present.      Mental Status: He is alert and oriented to person, place, and time.   Psychiatric:         Mood and Affect: Mood normal.         Behavior: Behavior normal.         Thought Content: Thought content normal.         Judgment: Judgment normal.         Significant Labs:  CBC:  Recent Labs   Lab 01/08/22  0541 01/09/22  0517 01/10/22  0538   WBC 13.81* 13.11* 13.51*   RBC 4.69 4.32* 4.17*   HGB 8.2* 7.7* 7.4*   HCT 28.5* 25.9* 25.7*   * 570* 579*   MCV 61* 60* 62*   MCH 17.5* 17.8* 17.7*   MCHC 28.8* 29.7* 28.8*     BNP:  Recent Labs   Lab 01/05/22  0507 01/07/22  0604 01/10/22  0539   * 1,483* 602*     CMP:  Recent Labs   Lab 01/05/22  0507 01/06/22  0459 01/07/22  0604 01/08/22  0541 01/09/22  0518 01/10/22  0538 01/11/22  0531      < > 134*   < > 92 101 90   CALCIUM 8.3*   < > 8.4*   < > 8.2* 8.5* 8.1*   ALBUMIN 2.3*  --  2.3*  --   --  2.1*  --    PROT 6.6  --  7.5  --   --  7.1  --    *   < > 131*   < > 131* 133* 132*   K 4.1   < > 4.8   < > 4.1 4.1 4.2   CO2 27   < > 22*   < > 22* 24 22*   CL 97   < > 100   < > 101 101 100   BUN 11   < > 8   < > 8 6 6   CREATININE 1.2   < > 1.3   < > 1.1 1.0 0.9   ALKPHOS 81  --  87  --   --  70  --    ALT 10  --  13  --   --  19  --    AST 17   --  20  --   --  33  --    BILITOT 0.6  --  0.8  --   --  0.6  --     < > = values in this interval not displayed.      Coagulation:   Recent Labs   Lab 01/09/22  0517 01/10/22  0538 01/11/22  0531   INR 1.9* 2.3* 2.1*   APTT 37.7* 39.9* 28.2     LDH:  Recent Labs   Lab 01/09/22  0517 01/09/22  0923 01/10/22  0538 01/11/22 0531   * 231 240 298*     Microbiology:  Microbiology Results (last 7 days)     Procedure Component Value Units Date/Time    Blood culture [[id number]] Collected: 01/07/22 0604    Order Status: Completed Specimen: Blood Updated: 01/11/22 1012     Blood Culture, Routine No Growth to date      No Growth to date      No Growth to date      No Growth to date      No Growth to date    Narrative:      From 2 different sites 30 minutes apart    Blood culture [[id number]] Collected: 01/07/22 0604    Order Status: Completed Specimen: Blood Updated: 01/11/22 1012     Blood Culture, Routine No Growth to date      No Growth to date      No Growth to date      No Growth to date      No Growth to date    Narrative:      From 2 different sites 30 minutes apart    Culture, Anaerobe [[id number]] Collected: 01/03/22 1822    Order Status: Completed Specimen: Wound from Abdomen Updated: 01/10/22 0850     Anaerobic Culture No anaerobes isolated    Blood culture [[id number]] Collected: 01/04/22 0425    Order Status: Completed Specimen: Blood Updated: 01/09/22 1012     Blood Culture, Routine No growth after 5 days.    Blood culture [[id number]] Collected: 01/04/22 0420    Order Status: Completed Specimen: Blood Updated: 01/09/22 1012     Blood Culture, Routine No growth after 5 days.    Blood culture [[id number]] Collected: 01/03/22 1723    Order Status: Completed Specimen: Blood Updated: 01/08/22 2012     Blood Culture, Routine No growth after 5 days.    Blood culture [[id number]] Collected: 01/03/22 1723    Order Status: Completed Specimen: Blood Updated: 01/08/22 2012     Blood Culture, Routine No growth after 5  days.    Blood culture [[id number]]  (Abnormal) Collected: 01/02/22 1946    Order Status: Completed Specimen: Blood Updated: 01/08/22 1226     Blood Culture, Routine Gram stain aer bottle: Gram positive cocci in clusters resembling Staph      Positive results previously called      METHICILLIN RESISTANT STAPHYLOCOCCUS AUREUS  ID consult required at [facility].  For susceptibility see order #[id number]      Blood culture [[id number]]  (Abnormal) Collected: 01/02/22 1946    Order Status: Completed Specimen: Blood Updated: 01/08/22 1013     Blood Culture, Routine Gram stain uzma bottle: Gram positive cocci in clusters resembling Staph      Results called to and read back by:Khai Durand RN 01/04/2022  22:54      Gram stain aer bottle: Gram positive cocci       Positive results previously called  01/05/2022     Comment: Previous comment was modified by NEG NEG at 11:38 on 01/05/2022 Gram   stain uzma bottle: Gram positive cocci in clusters resembling   StaphResults called to and read back by:Khai Durand RN 01/04/2022 22:54           METHICILLIN RESISTANT STAPHYLOCOCCUS AUREUS  ID consult required at [facility].  For susceptibility see order #[id number]      Clostridium difficile EIA [[id number]]     Order Status: Canceled Specimen: Stool     Blood culture [[id number]]  (Abnormal)  (Susceptibility) Collected: 01/01/22 0020    Order Status: Completed Specimen: Blood Updated: 01/06/22 1141     Blood Culture, Routine Gram stain aer bottle: Gram positive cocci       Results called to and read back by: DANIEL BLANCHARD RN  01/02/2022  22:36      METHICILLIN RESISTANT STAPHYLOCOCCUS AUREUS  ID consult required at [facility].       Comment: Previous comment was modified by RHONA MELGOZA at 10:51 on 01/06/2022 ID   consult required at [facility].ID   consult required at [facility].          Aerobic culture [[id number]]  (Abnormal)  (Susceptibility) Collected: 01/03/22 1822    Order Status: Completed Specimen: Wound from Abdomen Updated: 01/06/22 1126     Aerobic Bacterial Culture PSEUDOMONAS AERUGINOSA  Few      Blood culture [[id number]]  (Abnormal)  (Susceptibility) Collected: 01/01/22 0020    Order Status: Completed Specimen: Blood Updated: 01/06/22 1052     Blood Culture, Routine Gram stain aer bottle: Gram positive cocci in clusters resembling Staph       Results called to and read back by:DANIEL BLANCHARD 01/02/2022  23:29      METHICILLIN RESISTANT STAPHYLOCOCCUS AUREUS  [facility] at [facility].  ID consult required at [facility].      Culture, Respiratory with Gram Stain [[id number]]     Order Status: No result Specimen: Respiratory from Sputum, Expectorated           I have reviewed all pertinent labs within the past 24 hours.    Estimated Creatinine Clearance: 96.3 mL/min (based on SCr of 0.9 mg/dL).    Diagnostic Results:  I have reviewed all pertinent imaging results/findings within the past 24 hours.

## 2022-01-11 NOTE — PROGRESS NOTES
01/11/2022  Billlars Soto Jr    Current provider:  Santosh Avalos MD    TXP LVAD INTERROGATIONS 1/11/2022 1/11/2022 1/11/2022 1/10/2022 1/10/2022 1/10/2022 1/10/2022   Type HeartMate3 HeartMate3 HeartMate3 HeartMate3 HeartMate3 HeartMate3 HeartMate3   Flow 4.8 4.7 4.7 4.8 4.8 5.0 4.8   Speed 5300 97340 5300 5300 5300 5300 5300   PI 3.4 3.2 2.9 2.8 2.9 2.5 3.4   Power (Centeno) 3.9 3.8 4.0 3.8 3.8 3.8 3.9   LSL 4900 4900 4900 4900 4900 4900 4900   Pulsatility No Pulse - - - No Pulse No Pulse No Pulse          Rounded on Saba A Oren to ensure all mechanical assist device settings (IABP or VAD) were appropriate and all parameters were within limits.  I was able to ensure all back up equipment was present, the staff had no issues, and the Perfusion Department daily rounding was complete.    In emergency, the nursing units have been notified to contact the perfusion department either by:  Calling [phone] from 630am to 4pm Mon thru Fri, or by contacting the hospital  from 4pm to 630am and on weekends and asking to speak with the perfusionist on call.    8:39 AM

## 2022-01-11 NOTE — ASSESSMENT & PLAN NOTE
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 INR trending back down at 2.1 while holding Coumadin (got Vit K 1 mg IV on 1/6 for INR 3.0)  -LDH stable  -ECHO 10/9/21 with EF 20%, BRYAN, IVS bows into RV, G1DD, s/p Alferi stitch, mild-moderate RVSF, CVP 3, LVEDD 6.43 with LVAD speed set to 5300 rpm      Procedure: Device Interrogation Including analysis of device parameters  Current Settings: Ventricular Assist Device  Review of device function is stable      TXP LVAD INTERROGATIONS 1/11/2022 1/11/2022 1/11/2022 1/10/2022 1/10/2022 1/10/2022 1/10/2022   Type HeartMate3 HeartMate3 HeartMate3 HeartMate3 HeartMate3 HeartMate3 HeartMate3   Flow 4.8 4.7 4.7 4.8 4.8 5.0 4.8   Speed 5300 32874 5300 5300 5300 5300 5300   PI 3.4 3.2 2.9 2.8 2.9 2.5 3.4   Power (Centeno) 3.9 3.8 4.0 3.8 3.8 3.8 3.9   LSL 4900 4900 4900 4900 4900 4900 4900   Pulsatility No Pulse - - - No Pulse No Pulse No Pulse

## 2022-01-11 NOTE — PROGRESS NOTES
[facility] - Cardiology Stepdown  Heart Transplant  Progress Note    Patient Name: Saba Lott  MRN: [id number]  Admission Date: 12/31/2021  Hospital Length of Stay: 11 days  Attending Physician: Santosh Avalos MD  Primary Care Provider: Mary Lombardi MD  Principal Problem:Fever    Subjective:     **Interval History: Afebrile past 24 hours, CBC pending. He continues to repeatedly remove LVAD dressings to pick and scratch at DLES. No drainage, rash or erythema noted at DLES. Blood cxs 1/4 -ve and 1/7 with NGTD. Zosyn change to Cefepime by ID yesterday. Dapto continues. Abdomen remains distended but is soft and non tender. Per Int Cards, no intervention can be done for SMV thrombosis. INR is down to 2.1 (goal 1.5-2.0)    Continuous Infusions:  Scheduled Meds:   albuterol-ipratropium  3 mL Nebulization Q4H    atorvastatin  20 mg Oral QHS    ceFEPime (MAXIPIME) IVPB EXTENDED INFUSION  2 g Intravenous Q8H    DAPTOmycin (CUBICIN)  IV  10 mg/kg Intravenous Q24H    docusate sodium  100 mg Oral Daily    Lactobacillus rhamnosus GG  1 capsule Oral Daily    magnesium oxide  800 mg Oral TID     PRN Meds:acetaminophen, aluminum & magnesium hydroxide-simethicone, benzonatate, diphenhydrAMINE, guaiFENesin 100 mg/5 ml, ondansetron    Review of patient's allergies indicates:   Allergen Reactions    Iodine and iodide containing products      Objective:     Vital Signs (Most Recent):  Temp: 98.4 °F (36.9 °C) (01/11/22 0749)  Pulse: (!) 113 (01/11/22 1056)  Resp: 18 (01/11/22 0719)  BP: (!) 118/55 (01/11/22 0749)  SpO2: 99 % (01/11/22 0749) Vital Signs (24h Range):  Temp:  [98 °F (36.7 °C)-98.8 °F (37.1 °C)] 98.4 °F (36.9 °C)  Pulse:  [] 113  Resp:  [16-18] 18  SpO2:  [93 %-99 %] 99 %  BP: ()/(0-55) 118/55     Patient Vitals for the past 72 hrs (Last 3 readings):   Weight   01/11/22 0749 88.9 kg (195 lb 15.8 oz)   01/10/22 1000 88.8 kg (195 lb 12.3 oz)   01/10/22 0714 88 kg (194 lb 0.1 oz)     Body mass  index is 30.7 kg/m².      Intake/Output Summary (Last 24 hours) at 1/11/2022 1100  Last data filed at 1/11/2022 0600  Gross per 24 hour   Intake 702 ml   Output 200 ml   Net 502 ml       Hemodynamic Parameters:       Telemetry: ST with PVC's    Physical Exam  Constitutional:       Appearance: Normal appearance.   HENT:      Head: Normocephalic and atraumatic.   Eyes:      Conjunctiva/sclera: Conjunctivae normal.   Neck:      Comments: Neck veins are not elevated  Cardiovascular:      Rate and Rhythm: Regular rhythm. Tachycardia present.      Comments: Smooth VAD hum  Pulmonary:      Effort: Pulmonary effort is normal.      Breath sounds: Normal breath sounds.   Abdominal:      General: Bowel sounds are normal. There is distension.   Musculoskeletal:         General: No swelling. Normal range of motion.      Cervical back: Normal range of motion and neck supple.   Skin:     General: Skin is warm and dry.      Capillary Refill: Capillary refill takes 2 to 3 seconds.   Neurological:      General: No focal deficit present.      Mental Status: He is alert and oriented to person, place, and time.   Psychiatric:         Mood and Affect: Mood normal.         Behavior: Behavior normal.         Thought Content: Thought content normal.         Judgment: Judgment normal.         Significant Labs:  CBC:  Recent Labs   Lab 01/08/22  0541 01/09/22  0517 01/10/22  0538   WBC 13.81* 13.11* 13.51*   RBC 4.69 4.32* 4.17*   HGB 8.2* 7.7* 7.4*   HCT 28.5* 25.9* 25.7*   * 570* 579*   MCV 61* 60* 62*   MCH 17.5* 17.8* 17.7*   MCHC 28.8* 29.7* 28.8*     BNP:  Recent Labs   Lab 01/05/22  0507 01/07/22  0604 01/10/22  0539   * 1,483* 602*     CMP:  Recent Labs   Lab 01/05/22  0507 01/06/22  0459 01/07/22  0604 01/08/22  0541 01/09/22  0518 01/10/22  0538 01/11/22  0531      < > 134*   < > 92 101 90   CALCIUM 8.3*   < > 8.4*   < > 8.2* 8.5* 8.1*   ALBUMIN 2.3*  --  2.3*  --   --  2.1*  --    PROT 6.6  --  7.5  --   --   7.1  --    *   < > 131*   < > 131* 133* 132*   K 4.1   < > 4.8   < > 4.1 4.1 4.2   CO2 27   < > 22*   < > 22* 24 22*   CL 97   < > 100   < > 101 101 100   BUN 11   < > 8   < > 8 6 6   CREATININE 1.2   < > 1.3   < > 1.1 1.0 0.9   ALKPHOS 81  --  87  --   --  70  --    ALT 10  --  13  --   --  19  --    AST 17  --  20  --   --  33  --    BILITOT 0.6  --  0.8  --   --  0.6  --     < > = values in this interval not displayed.      Coagulation:   Recent Labs   Lab 01/09/22  0517 01/10/22  0538 01/11/22  0531   INR 1.9* 2.3* 2.1*   APTT 37.7* 39.9* 28.2     LDH:  Recent Labs   Lab 01/09/22 0517 01/09/22 0923 01/10/22  0538 01/11/22  0531   * 231 240 298*     Microbiology:  Microbiology Results (last 7 days)     Procedure Component Value Units Date/Time    Blood culture [[id number]] Collected: 01/07/22 0604    Order Status: Completed Specimen: Blood Updated: 01/11/22 1012     Blood Culture, Routine No Growth to date      No Growth to date      No Growth to date      No Growth to date      No Growth to date    Narrative:      From 2 different sites 30 minutes apart    Blood culture [[id number]] Collected: 01/07/22 0604    Order Status: Completed Specimen: Blood Updated: 01/11/22 1012     Blood Culture, Routine No Growth to date      No Growth to date      No Growth to date      No Growth to date      No Growth to date    Narrative:      From 2 different sites 30 minutes apart    Culture, Anaerobe [[id number]] Collected: 01/03/22 1822    Order Status: Completed Specimen: Wound from Abdomen Updated: 01/10/22 0850     Anaerobic Culture No anaerobes isolated    Blood culture [[id number]] Collected: 01/04/22 0425    Order Status: Completed Specimen: Blood Updated: 01/09/22 1012     Blood Culture, Routine No growth after 5 days.    Blood culture [[id number]] Collected: 01/04/22 0420    Order Status: Completed Specimen: Blood Updated: 01/09/22 1012     Blood Culture, Routine No growth after 5 days.    Blood culture  [[id number]] Collected: 01/03/22 1723    Order Status: Completed Specimen: Blood Updated: 01/08/22 2012     Blood Culture, Routine No growth after 5 days.    Blood culture [[id number]] Collected: 01/03/22 1723    Order Status: Completed Specimen: Blood Updated: 01/08/22 2012     Blood Culture, Routine No growth after 5 days.    Blood culture [[id number]]  (Abnormal) Collected: 01/02/22 1946    Order Status: Completed Specimen: Blood Updated: 01/08/22 1226     Blood Culture, Routine Gram stain aer bottle: Gram positive cocci in clusters resembling Staph      Positive results previously called      METHICILLIN RESISTANT STAPHYLOCOCCUS AUREUS  ID consult required at [facility] locations.  For susceptibility see order #[id number]      Blood culture [[id number]]  (Abnormal) Collected: 01/02/22 1946    Order Status: Completed Specimen: Blood Updated: 01/08/22 1013     Blood Culture, Routine Gram stain uzma bottle: Gram positive cocci in clusters resembling Staph      Results called to and read back by:Khai Durand RN 01/04/2022  22:54      Gram stain aer bottle: Gram positive cocci       Positive results previously called  01/05/2022     Comment: Previous comment was modified by NEG NEG at 11:38 on 01/05/2022 Gram   stain uzma bottle: Gram positive cocci in clusters resembling   StaphResults called to and read back by:Khai Durand RN 01/04/2022 22:54           METHICILLIN RESISTANT STAPHYLOCOCCUS AUREUS  ID consult required at [facility].  For susceptibility see order #[id number]      Clostridium difficile EIA [[id number]]     Order Status: Canceled Specimen: Stool     Blood culture [[id number]]  (Abnormal)  (Susceptibility) Collected: 01/01/22 0020    Order Status: Completed Specimen: Blood Updated: 01/06/22 1141     Blood Culture, Routine Gram stain aer bottle: Gram positive cocci       Results called to and read back by: DANIEL BLANCHARD RN  01/02/2022  22:36      METHICILLIN  RESISTANT STAPHYLOCOCCUS AUREUS  ID consult required at [facility].       Comment: Previous comment was modified by RHONA MELGOZA at 10:51 on 01/06/2022 ID   consult required at [facility].ID   consult required at [facility].         Aerobic culture [[id number]]  (Abnormal)  (Susceptibility) Collected: 01/03/22 1822    Order Status: Completed Specimen: Wound from Abdomen Updated: 01/06/22 1126     Aerobic Bacterial Culture PSEUDOMONAS AERUGINOSA  Few      Blood culture [[id number]]  (Abnormal)  (Susceptibility) Collected: 01/01/22 0020    Order Status: Completed Specimen: Blood Updated: 01/06/22 1052     Blood Culture, Routine Gram stain aer bottle: Gram positive cocci in clusters resembling Staph       Results called to and read back by:DANIEL BLANCHARD 01/02/2022  23:29      METHICILLIN RESISTANT STAPHYLOCOCCUS AUREUS  ID consult required at [facility].  ID consult required at [facility].      Culture, Respiratory with Gram Stain [[id number]]     Order Status: No result Specimen: Respiratory from Sputum, Expectorated           I have reviewed all pertinent labs within the past 24 hours.    Estimated Creatinine Clearance: 96.3 mL/min (based on SCr of 0.9 mg/dL).    Diagnostic Results:  I have reviewed all pertinent imaging results/findings within the past 24 hours.    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 Patient is short of breath and uncomfortable at the time of giving history. He reports chills, weakness, cough, sputum production, weight gain, nausea, vomiting and trouble breathing. He went to his PCP office  yesterday where his temp was 103 and was sent to the hospital afterwards. His white count is 20 on arrival. He is unable to lay flat. His HR is 102-142 sinus. He needs to be diuresed upon arrival but K is 2.9, ordered IV and PO Kcl, he vomited out oral potassium, IV replacement will be done.       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 On admit presented with cough, nausea, vomiting, weakness, and temp of 103 F  - Admits to noncompliance with suppressive Doxy PTA  - Blood cultures 1/1 & 1/2 positive for Staph, repeats -ve and blood cx 1/7 with NGTD  - Abdominal culture 1/3 positive for pseudomonas  - Afebrile past 24 hours, CBC results pending  - ID following, vanc changed to dapto on 1/1 given hx of VISA/VRSA/MRSA. Cefepime changed to jacoby 1/2 and then jacoby changed to Zosyn 1/6 for Pseudomonas from epigastric wound cx done 1/3. Zosyn changed to Cefepime 1/10 by ID  - CT c/a/p on admit with left basilar ground-glass and airspace opacities with additional slight ground-glass attenuation in the left upper lobe.  Abdomen distended and tender on 1/9 - CT c/a/p repeated 1/9 and shows increased soft tissue thickening about the distal superior mesenteric vasculature with apparent vessel enlargement.  Engorged distal small vessel mesenteric vasculature and surrounding edema/stranding.  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  Differential also includes mesenteric panniculitis however felt less likely. Stable postoperative changes of LVAD placement.  Stable soft tissue induration about the drive line catheter within the ventral abdominal wall.  No focal fluid collection or abscess. Per Int Cards, no intervention can be done  - Long h/o PICC line issues. Will consult IR for tunneled PICC line for home IV antibiotics    Sepsis  -See fever    MRSA  bacteremia  -See fever    LVAD (left ventricular assist device) present  -S/P DT HM3 with MVR 2/2/20  -Current speed 5300  -INR goal 1.5-2.0 (hx of ICH). INR trending back down at 2.1 while holding Coumadin (got Vit K 1 mg IV on 1/6 for INR 3.0)  -LDH stable  -ECHO 10/9/21 with EF 20%, BRYAN, IVS bows into RV, G1DD, s/p Alferi stitch, mild-moderate RVSF, CVP 3, LVEDD 6.43 with LVAD speed set to 5300 rpm      Procedure: Device Interrogation Including analysis of device parameters  Current Settings: Ventricular Assist Device  Review of device function is stable      TXP LVAD INTERROGATIONS 1/11/2022 1/11/2022 1/11/2022 1/10/2022 1/10/2022 1/10/2022 1/10/2022   Type HeartMate3 HeartMate3 HeartMate3 HeartMate3 HeartMate3 HeartMate3 HeartMate3   Flow 4.8 4.7 4.7 4.8 4.8 5.0 4.8   Speed 5300 01542 5300 5300 5300 5300 5300   PI 3.4 3.2 2.9 2.8 2.9 2.5 3.4   Power (Centeno) 3.9 3.8 4.0 3.8 3.8 3.8 3.9   LSL 4900 4900 4900 4900 4900 4900 4900   Pulsatility No Pulse - - - No Pulse No Pulse [place] [id number]  Heart Transplant  [facility] - Cardiology Stepdown

## 2022-01-11 NOTE — PLAN OF CARE
Patient free from falls and injuries throughout shift.  AAO and VSS.  Patient denies chest pain and SOB. Dopplers 78/0 - 82/0.  LVAD working WNL; dressing change completed 3X during shift (Pt takes off dressing and scratches drive line site and surrounding areas because of itching). Benadryl given for itching. No output recorded. PT had several urine occurrences throughout shift but does not use urinal. Reminded pt the importance of getting output with urinal. Pt proceeds to use toilet instead.  No acute events overnight. Patient resting well at this time.  Plan of care discussed with patient.  Patient verbalizes understanding.  Will continue to monitor.

## 2022-01-11 NOTE — PROGRESS NOTES
Palliative follow up visit.  Pt unavailable -echo.  Per previous visit/GOC discussion, pt remains full code/full treatment, aware of high risk for readmission.  He has reaffirmed HCPOA on file.  Palliative will continue to follow for ongoing goals of care discussions as needed.

## 2022-01-11 NOTE — ASSESSMENT & PLAN NOTE
56-year-old male with history of DCM s/p LVAD 2/6/2020, left occipital IPH, lead endocarditis s/p removal, history of VISA and polymicrobial  bacteremia in October (pseudomonas, e coli, proteus.  Strongy negative) on chronic suppressive doxy, presents from clinic with fevers.    Blood cultures of 1/1 - 2/4 bottles + MRSA  Blood cultures 1/2 - 3/4 MRSA  Blood cultures  1/3, 1/4, 1/7  - NGTD   Abdominal wound cx - + Pseudomonas - pan sensitive    CT A/P 1/2/22 - unchanged small region of soft tissue induration and slight skin thickening about the drive line in anterior abdominal wall. No definite focal fluid collection about the LVAD. No definite new regions of soft tissue induration or focal fluid collections are identified about the remaining subcutaneous course of the LVAD driveline  Noted patchy LLL GGO and airspace opacities with additional patchy ground glass attenuation in DEVORAH  Slight non-specific perinephric fat stranding - U/A wnl  Increased soft tissue thickening about the distal superior mesenteric vasculature with apparent vessel enlargement.  Engorged distal small vessel mesenteric vasculature and surrounding edema/stranding. 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  Differential also includes mesenteric panniculitis however felt less likely.     1/11/21: Leukocytosis improving (26K  was down to 13K no trended up to 16). Now afebrile. Patient again states he feels better. COVID negative, RIP panel negative.    Pan sensitive pseudomonas on ABD wound cx but no active sign of infection at DLES and no cultures done  Concern that given MRSA bacteremia, may have endovascular infection if thrombus present in SMV  2D echo done: poor endocardial visualization but MV now shows significant destruction vs prior 2D in 10/2021 - ? Cause/? infection  Epigastric wound healed  Per discussion with HTS - patient not listed    Plan:  1.   Continue IV daptomycin 10 mg/kg for now MRSA  bacteremia. Confirmed susceptibility with Micro lab.  KALI <.5.   Weekly CK while on dapto  2.   Continue cefepime for pseudomonas from epigastric abdominal wound   3.   Consider MACIEL out of concern for endocarditis of mitral valve  4.  Continue to monitor fever/wbc trend  5.   Plan discussed with ID staff and primary team - further discussion with HTS in am  6.  Will follow

## 2022-01-11 NOTE — PLAN OF CARE
AAOx4.  No SOB or other distress.  No complaints of pain or otherwise at this time.  Free of falls, traumas, and injuries.  Patient found with no dressing to LVAD this afternoon; he states he itches. Dressing applied and P.O. Benadryl given at that time. Nurse returned in approximately 1 hour and dressing again found to have been completely removed by patient. Attempted several times throughout shift to educate patient about not picking/scratching at LVAD dressing but patient still noted to be rubbing new dressing as soon as it is applied. Vital signs stable.  Plan of care reviewed with patient.  Will continue to monitor.

## 2022-01-11 NOTE — PROGRESS NOTES
Interdisciplinary Rounds Report:   Attended interdisciplinary rounds with the [facility]/CTS services including the LVAD Coordinators, social workers, cardiologists, surgeons,  PT/OT/Speech, dietician, and unit charge nurses. Discussed patient status, plan of care, goals of care, including DC date, and post discharge needs. Plan of care will be discussed with the patient and/or family per the physician while rounding on the floor. This is a recurring meeting that is medically and socially necessary to collaborate with the interdisciplinary team to assist patient needs and safe discharge.

## 2022-01-11 NOTE — ASSESSMENT & PLAN NOTE
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 On admit presented with cough, nausea, vomiting, weakness, and temp of 103 F  - Admits to noncompliance with suppressive Doxy PTA  - Blood cultures 1/1 & 1/2 positive for Staph, repeats -ve and blood cx 1/7 with NGTD  - Abdominal culture 1/3 positive for pseudomonas  - Afebrile past 24 hours, CBC results pending  - ID following, vanc changed to dapto on 1/1 given hx of VISA/VRSA/MRSA. Cefepime changed to jacoby 1/2 and then jacoby changed to Zosyn 1/6 for Pseudomonas from epigastric wound cx done 1/3. Zosyn changed to Cefepime 1/10 by ID  - CT c/a/p on admit with left basilar ground-glass and airspace opacities with additional slight ground-glass attenuation in the left upper lobe.  Abdomen distended and tender on 1/9 - CT c/a/p repeated 1/9 and shows increased soft tissue thickening about the distal superior mesenteric vasculature with apparent vessel enlargement.  Engorged distal small vessel mesenteric vasculature and surrounding edema/stranding.  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  Differential also includes mesenteric panniculitis however felt less likely. Stable postoperative changes of LVAD placement.  Stable soft tissue induration about the drive line catheter within the ventral abdominal wall.  No focal fluid collection or abscess. Per Int Cards, no intervention can be done  - Long h/o PICC line issues. Will consult IR for tunneled PICC line for home IV antibiotics

## 2022-01-11 NOTE — PROGRESS NOTES
[facility] Step[facility]  Infectious Disease  Progress Note    Patient Name: Saba Lott  MRN: [id number]  Admission Date: 12/31/2021  Length of Stay: 11 days  Attending Physician: Santosh Avalos MD  Primary Care Provider: Mary Lombardi MD    Isolation Status: Contact  Assessment/Plan:      * Fever  56-year-old male with history of DCM s/p LVAD 2/6/2020, left occipital IPH, lead endocarditis s/p removal, history of VISA and polymicrobial  bacteremia in October (pseudomonas, e coli, proteus.  Strongy negative) on chronic suppressive doxy, presents from clinic with fevers.    Blood cultures of 1/1 - 2/4 bottles + MRSA  Blood cultures 1/2 - 3/4 MRSA  Blood cultures  1/3, 1/4, 1/7  - NGTD   Abdominal wound cx - + Pseudomonas - pan sensitive    CT A/P 1/2/22 - unchanged small region of soft tissue induration and slight skin thickening about the drive line in anterior abdominal wall. No definite focal fluid collection about the LVAD. No definite new regions of soft tissue induration or focal fluid collections are identified about the remaining subcutaneous course of the LVAD driveline  Noted patchy LLL GGO and airspace opacities with additional patchy ground glass attenuation in DEVORAH  Slight non-specific perinephric fat stranding - U/A wnl  Increased soft tissue thickening about the distal superior mesenteric vasculature with apparent vessel enlargement.  Engorged distal small vessel mesenteric vasculature and surrounding edema/stranding. 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  Differential also includes mesenteric panniculitis however felt less likely.     1/11/21: Leukocytosis improving (26K  was down to 13K no trended up to 16). Now afebrile. Patient again states he feels better. COVID negative, RIP panel negative.    Pan sensitive pseudomonas on ABD wound cx but no active sign of infection at DLES and no cultures done  Concern that given MRSA bacteremia, may  have endovascular infection if thrombus present in SMV  2D echo done: poor endocardial visualization but MV now shows significant destruction vs prior 2D in 10/2021 - ? Cause/? infection  Epigastric wound healed  Per discussion with HTS - patient not listed    Plan:  1.   Continue IV daptomycin 10 mg/kg for now MRSA bacteremia. Confirmed susceptibility with Micro lab.  KALI <.5.   Weekly CK while on dapto  2.   Continue cefepime for pseudomonas from epigastric abdominal wound   3.   Consider ST out of concern for endocarditis of mitral valve  4.  Continue to monitor fever/wbc trend  5.   Plan discussed with ID staff and primary team - further discussion with HTS in am  6.  Will follow          Anticipated Disposition: tbd    Thank you for your consult. I will follow-up with patient. Please contact us if you have any additional questions.    CONG Dowell  Infectious Disease  [facility] - [facility] Step[facility]    Subjective:     Principal Problem:Fever    HPI: Mr. Lott is a 54 y/o M pt with DCM s/p HM3 implant 2/6/2020 c/b MRSA DLES, left occipital IPH with mycotic aneurysm, VISA bacteremia and ICD lead endocarditis s/p lead extraction on 8/9/2021, recent polymicrobial GNR bacteremia s/p  presented with worsening weakness for approx 1 wk and was transferred to [facility] 10/5 for concern for sepsis found to have polymicrobial GNR bacteremia s/p approx 6 wk zosyn (stopped 11/16) presented with with fever from clinic and was admitted for presumed sepsis. Pt reports feeling weak since day prior, fevers, chills, diarrhea and vomiting x 1. Denies sore throat, HA, abdominal pain, dysuria, sob or cough. No sick contacts. No increased drainage from DLES. Lives alone.    In the ED pt febrile, tachycardic and with WBC 20. CXR with b/l edema.           Interval History:   No AEON.   AFebrile now and WBC improved from peak 26 now to 16  Repeat BCXs 1/7 NGTD  Wound cx epigastric wound - Pan sensitive pseudomonas - patient reports  now healed  Reports fever, chills and sweats now resolved.  Denies abdominal pain      Review of Systems   Constitutional: Negative for chills, diaphoresis, fatigue and fever.   Respiratory: Negative for cough and shortness of breath.    Cardiovascular: Negative for chest pain and leg swelling.   Gastrointestinal: Negative for abdominal pain, diarrhea, nausea and vomiting.   Genitourinary: Negative for difficulty urinating, dysuria, flank pain and hematuria.   Musculoskeletal: Negative for arthralgias, back pain, joint swelling, myalgias and neck pain.   Skin: Positive for wound (open wound epigastric area now healed). Negative for rash.   Neurological: Negative for dizziness, weakness and headaches.   Psychiatric/Behavioral: Negative for agitation. The patient is not nervous/anxious.      Objective:     Vital Signs (Most Recent):  Temp: 97.6 °F (36.4 °C) (01/11/22 1544)  Pulse: (!) 113 (01/11/22 1646)  Resp: 18 (01/11/22 1632)  BP: (!) 80/0 (01/11/22 1544)  SpO2: 100 % (01/11/22 1632) Vital Signs (24h Range):  Temp:  [97.6 °F (36.4 °C)-99 °F (37.2 °C)] 97.6 °F (36.4 °C)  Pulse:  [] 113  Resp:  [15-20] 18  SpO2:  [94 %-100 %] 100 %  BP: ()/(0-55) 80/0     Weight: 88.9 kg (195 lb 15.8 oz)  Body mass index is 30.7 kg/m².    Estimated Creatinine Clearance: 96.3 mL/min (based on SCr of 0.9 mg/dL).    Physical Exam  Vitals and nursing note reviewed.   Constitutional:       General: He is not in acute distress.     Appearance: He is well-developed. He is obese. He is not ill-appearing, toxic-appearing or diaphoretic.       Cardiovascular:      Rate and Rhythm: Tachycardia present.      Comments: VAD hum  Pulmonary:      Effort: Pulmonary effort is normal. No respiratory distress.      Breath sounds: No wheezing or rales.   Abdominal:      General: There is no distension.      Palpations: Abdomen is soft.      Tenderness: There is no abdominal tenderness. There is no guarding.      Comments: Epigastric wound  undressed - no active drainage.  No tenderness, erythema.  Surrounding induration.      DLES site not dressed; no drainage. No erythema   Musculoskeletal:         General: No swelling or tenderness. Normal range of motion.      Cervical back: Normal range of motion.   Skin:     General: Skin is warm and dry.      Findings: No erythema or rash.   Neurological:      Mental Status: He is alert and oriented to person, place, and time.   Psychiatric:         Behavior: Behavior normal.             Significant Labs:   Blood Culture:   Recent Labs   Lab 01/02/22  1946 01/03/22  1723 01/04/22  0420 01/04/22  0425 01/07/22  0604   LABBLOO Gram stain aer bottle: Gram positive cocci in clusters resembling Staph  Positive results previously called  METHICILLIN RESISTANT STAPHYLOCOCCUS AUREUS  ID consult required at [facility].  For susceptibility see order #[id number]  *  Gram stain uzma bottle: Gram positive cocci in clusters resembling Staph  Results called to and read back by:Khai Durand RN 01/04/2022  22:54  Gram stain aer bottle: Gram positive cocci   Positive results previously called  01/05/2022  METHICILLIN RESISTANT STAPHYLOCOCCUS AUREUS  ID consult required at [facility].  For susceptibility see order #[id number]  * No growth after 5 days.  No growth after 5 days. No growth after 5 days. No growth after 5 days. No Growth to date  No Growth to date  No Growth to date  No Growth to date  No Growth to date  No Growth to date  No Growth to date  No Growth to date  No Growth to date  No Growth to date     CBC:   Recent Labs   Lab 01/10/22  0538 01/11/22  1031   WBC 13.51* 16.01*   HGB 7.4* 7.2*   HCT 25.7* 24.8*   * 553*     CMP:   Recent Labs   Lab 01/10/22  0538 01/11/22  0531   * 132*   K 4.1 4.2    100   CO2 24 22*    90   BUN 6 6   CREATININE 1.0 0.9   CALCIUM 8.5* 8.1*   PROT 7.1  --    ALBUMIN 2.1*  --    BILITOT  0.6  --    ALKPHOS 70  --    AST 33  --    ALT 19  --    ANIONGAP 8 10   EGFRNONAA >60.0 >60.0     Wound Culture:   Recent Labs   Lab 07/23/21  1004 08/09/21  1119 08/09/21  1128 10/05/21  1732 01/03/22  1822   LABAERO METHICILLIN RESISTANT STAPHYLOCOCCUS AUREUS  Moderate  * No growth No growth METHICILLIN RESISTANT STAPHYLOCOCCUS AUREUS  Rare  * PSEUDOMONAS AERUGINOSA  Few  *       Significant Imaging: I have reviewed all pertinent imaging results/findings within the past 24 hours.   Transthoracic echo (TTE) complete  Order# [id number]  Reading physician: Roman Varghese MD Ordering physician: Mallika Lugo NP Study date: 1/11/22       Reason for Exam  Priority: Routine  Dx: Complication involving left ventricular assist device (LVAD) [T82.9XXA (ICD-10-CM)]     Result Image Hyperlink     Show images for Echo    Summary    · LVIDd 6.60 cm  · The left ventricle is moderately enlarged with eccentric hypertrophy and severely decreased systolic function.  · There is an LVAD present. Base speed is 5300 RPMs. The pump type is a Heartmate III. The interventricular septum appears midline. The aortic valve opens intermittently.  · Severe left atrial enlargement.  · The estimated ejection fraction is 25%.  · Left ventricular diastolic dysfunction.  · Tachycardia was present  · Normal right ventricular size.  · Right ventricle not well visualized due to poor acoustic window.  · Poor endocardial visualization  · Mild tricuspid regurgitation.  · Elevated central venous pressure (15 mmHg).  · The estimated PA systolic pressure is 46 mmHg.  · There has been interval degeneration of the mitral valve in comparison with prior study, suspect partial flail posterior leaflet, cannot exclude significant mitral regurgitation.          CT Chest Abdomen Pelvis Without Contrast (XPD) [[id number]] (Abnormal) Resulted: 01/09/22 1113   Order Status: Completed Updated: 01/09/22 1115   Narrative:     EXAMINATION:   CT CHEST ABDOMEN PELVIS  WITHOUT CONTRAST(XPD)     CLINICAL HISTORY:   LVAD pt, DLES infection;     TECHNIQUE:   Low dose axial images, sagittal and coronal reformations were obtained from the thoracic inlet to the pubic synthesis without contrast     COMPARISON:   CT 01/02/2022     FINDINGS:   Base of neck structures in thoracic soft tissues are within normal limits.     Postoperative changes of LVAD placement.  Unable to assess cannula patency on noncontrast exam.  No evidence of focal fluid collection or abscess.  Stable subcutaneous soft tissue induration about the drive line catheter course within the midline ventral abdominal wall (series 3, image 60).  No detrimental interval change.     Heart is stable in size.  No pericardial effusion.  Thoracic aorta is normal in caliber.     Stable mildly prominent mediastinal lymph nodes.  No new adenopathy.     Prominence of the bilateral hilar vasculature and peribronchial thickening.  Mild interlobular septal thickening within the bilateral upper lobes with patchy ground-glass density.  Findings are suggestive for mild pulmonary edema.  No focal alveolar consolidation.  No significant pleural effusion or pneumothorax.     Liver is normal in size and attenuation.  No focal hepatic lesion detected noting decreased sensitivity on noncontrast exam.  Gallbladder is contracted and contains a calcified stone.  No biliary ductal dilatation.     Spleen is upper limits of normal in size.     Adrenal glands and pancreas are unremarkable.     Stable lobular contour of the right kidney which may represent cortical scarring.  No renal stone.  No hydroureteronephrosis.  Urinary bladder is decompressed.     Small and large bowel are normal caliber.  No evidence of bowel obstruction or inflammation.  Colonic diverticulosis.     Increased soft tissue thickening about the distal superior mesenteric vascular with apparent vessel enlargement (series 3, image 92).  Engorged distal small vessel mesenteric  vasculature and surrounding edema/stranding.  Limited evaluation given lack of intravenous contrast.     Normal caliber abdominal aorta.     Fat containing umbilical hernia.     Status post intramedullary damaris fixation of the bilateral femurs.  Degenerative changes of the osseous structures.  Stable peripherally sclerotic lucent lesion within the left iliac bone.  Chronic right rib fractures.  No acute fracture detected.  Sternotomy wires noted.    Impression:       1. Increased soft tissue thickening about the distal superior mesenteric vasculature with apparent vessel enlargement.  Engorged distal small vessel mesenteric vasculature and surrounding edema/stranding.  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  Differential also includes mesenteric panniculitis however felt less likely.   2. Stable postoperative changes of LVAD placement.  Stable soft tissue induration about the drive line catheter within the ventral abdominal wall.  No focal fluid collection or abscess.   3. Mild bilateral interlobular septal thickening and ground-glass density suggestive for mild pulmonary edema.   4. Cholelithiasis.   5. Additional findings as above.   This report was flagged in Epic as abnormal.       Electronically signed by: Jimenez Gardner   Date: 01/09/2022   Time: 11:13   X-Ray Chest AP Portable [[id number]] Resulted: 01/05/22 1320   Order Status: Completed Updated: 01/05/22 1322   Narrative:     EXAMINATION:   XR CHEST AP PORTABLE     CLINICAL HISTORY:   cough;     TECHNIQUE:   Single frontal view of the chest was performed.     COMPARISON:   01/03/2022     FINDINGS:   Stable mild cardiomegaly.     Stable mild pulmonary venous congestion.     The previously depicted lines, tubes, and postsurgical changes appear similar.  The latter include a continuous flow left ventricular assistant device.    Impression:       No significant interval change since prior exam noted.       Electronically  signed by: Christine Dotson   Date: 01/05/2022   Time: 13:20       Imaging History    2022  Date Procedure Name Status Accession Number Location   01/09/22 10:36 AM CT Chest Abdomen Pelvis Without Contrast (XPD) Final [id number] [facility]   01/05/22 12:58 PM X-Ray Chest AP Portable Final [id number] [facility]   01/03/22 05:17 PM X-Ray Chest AP Portable Final [id number] [facility]   01/02/22 08:51 PM CT Chest Abdomen Pelvis Without Contrast (XPD) Final [id number] [facility]   01/02/22 06:54 PM X-Ray Chest AP Portable Final [id number] [facility]   01/01/22 12:06 AM X-Ray Chest 1 View Final [id number] [facility]   01/11/22 09:28 AM Echo Final [id number] [facility]   2021  Date Procedure Name Status Accession Number Location   12/31/21 12:00 AM CARDIAC MONITORING STRIPS Final

## 2022-01-12 NOTE — PT/OT/SLP PROGRESS
"  Physical Therapy Treatment  Co-treatment with OT due to acuity of condition, level of skilled assist needed for assessment of safety with mobility- patient with decreased activity tolerance, decreased willingness to participate in two separate therapy sessions.   Patient Name:  Saba Lott   MRN:  [id number]    Recommendations:     Discharge Recommendations:  home health PT   Discharge Equipment Recommendations: none   Barriers to discharge: None    Assessment:     Saba Lott is a 56 y.o. male admitted with a medical diagnosis of Fever.  He presents with the following impairments/functional limitations:  weakness,impaired endurance,impaired functional mobilty,gait instability,impaired balance,impaired cardiopulmonary response to activity,decreased safety awareness. The patient's mobility is primarily limited due to generalized weakness, impaired cardiopulmonary endurance. Patient found L sidelying, reporting "feeling weak", initially resistant to mobilize with therapy, willing to ambulate with encouragement. He showed increased gait tolerance this session, ambulated 450' with no AD and contact guard assist. Mild instability with gait, may benefit from SPC use. He would benefit from HH therapy to address the above deficits.     Rehab Prognosis: Good; patient would benefit from acute skilled PT services to address these deficits and reach maximum level of function.    Recent Surgery: * No surgery found *      Plan:     During this hospitalization, patient to be seen 3 x/week to address the identified rehab impairments via gait training,therapeutic activities,therapeutic exercises and progress toward the following goals:    · Plan of Care Expires:  01/31/22    Subjective     Chief Complaint: "I feel bad, I just feel weak"  Patient/Family Comments/goals: increase activity tolerance  Pain/Comfort:  Pain Rating 1: 0/10      Objective:     Communicated with RN prior to session.  Patient found left sidelying with " peripheral IV,LVAD,telemetry upon PT entry to room.     General Precautions: Standard, fall,contact,LVAD   Orthopedic Precautions:N/A   Braces: N/BRANDI  Respiratory Status: Room air     Functional Mobility:    Bed Mobility  Found sitting up in bed  Sit to supine: stand by assistance    Transfers Sit to Stand:  minimum assistance with HHA to contact guard assist with cues for set up   Gait  Gait Distance: 450 ft with no AD  Assistance Level: stand by assistance  Description: wide SKYLER, increased lateral weight shift L>R, decreased weight shift to R LE in stance phase, decreased step length and hip extension ALEJANDRA, minimal foot clearance with shuffling gait    Patient reported lightheadedness with gait at 100' and 200', deferred seated/standing rest break in spite of encouragement and ed on importance of energy conservation               AM-PAC 6 CLICK MOBILITY  Turning over in bed (including adjusting bedclothes, sheets and blankets)?: 4  Sitting down on and standing up from a chair with arms (e.g., wheelchair, bedside commode, etc.): 4  Moving from lying on back to sitting on the side of the bed?: 4  Moving to and from a bed to a chair (including a wheelchair)?: 4  Need to walk in hospital room?: 3  Climbing 3-5 steps with a railing?: 3  Basic Mobility Total Score: 22       Therapeutic Activities and Exercises:   Patient educated on role of therapy, goals of session, benefits of out of bed mobility. Patient agreeable to mobilize with therapy.      Ambulated to bathroom, patient performed ADLs with OT at sink, good standing tolerance, reaching outside SKYLER unilaterally with contact guard assist.     Gait training: cued for activity pacing and energy conservation strategies, educated on importance of ambulating 2x day to increase activity tolerance and prevent functional decline    Ambulated to bathroom for toileting with OT.     Reviewed seated therex: LAQ, marching, AP, sit to stand with minimal use of UE for support;  "encouraged patient to perform 10 reps 3xday.   Patient requesting to return to bed following gait training, reinforced importance of sitting up in chair for cardiopulmonary function, verbalized understanding and continued to decline.    Patient educated on PT schedule.  Encouraged patient to ambulate, sit up in chair 3x/day to prevent deconditioning during hospitalization. Patient verbalized understanding and agreement not to mobilize without RN assist. Patient in agreement with PT POC.      ·  LVAD:   ? Pt found with LVAD on wall power   ? Pt performed transition from LVAD wall power > battery power with supervision assistance.   ? Pt educated on importance of checking anchor daily- patient not wearing anchor; patient has been refusing driveline dressing because "it's itchy" in spite of ed on importance of dressing  ? Pt left on wall power at end of therapy session   ? No alarms sounded during session      Patient left sitting EOB with all lines intact and call button in reach..    GOALS:   Multidisciplinary Problems     Physical Therapy Goals        Problem: Physical Therapy Goal    Goal Priority Disciplines Outcome Goal Variances Interventions   Physical Therapy Goal     PT, PT/OT Ongoing, Progressing     Description: Goals to be met by: 2022     Patient will increase functional independence with mobility by performin. Sit to stand transfer with Modified [place]  2. Bed to chair transfer with Supervision using LRAD  3. Gait  x 150 feet with Supervision using LRAD with no seated rest breaks and VSS.   4. Ascend/descend 4 stair with no Handrails Stand-by Assistnce.   5. Lower extremity exercise program x30 reps per handout, with independence                     Time Tracking:     PT Received On: 22  PT Start Time: 1017     PT Stop Time: 1050  PT Total Time (min): 33 min     Billable Minutes: Gait Training 24    Treatment Type: Treatment  PT/PTA: PT     PTA Visit Number: 0     2022  "

## 2022-01-12 NOTE — CARE UPDATE
"RAPID RESPONSE NURSE CHART REVIEW        Chart Reviewed: 01/12/2022, 11:22 AM    MRN: [id number]  Bed: 302/302 A    Dx: Fever    Saba Lott has a past medical history of Acute decompensated heart failure, Encounter for blood transfusion, Hypokalemia, Left ventricular assist device complication, LVAD (left ventricular assist device) present, and Presence of left ventricular assist device (LVAD).    Last VS: BP (!) 88/0 (BP Location: Left arm, Patient Position: Lying)   Pulse (!) 129   Temp 98.7 °F (37.1 °C) (Oral)   Resp 18   Ht 5' 7" (1.702 m)   Wt 89.1 kg (196 lb 6.9 oz)   SpO2 97%   BMI 30.77 kg/m²     24H Vital Sign Range:  Temp:  [97.5 °F (36.4 °C)-98.7 °F (37.1 °C)]   Pulse:  []   Resp:  [15-20]   BP: (70-88)/(0)   SpO2:  [95 %-100 %]     Level of Consciousness (AVPU): responds to voice    Recent Labs     01/10/22  0538 01/11/22  1031 01/12/22  0327   WBC 13.51* 16.01* 15.73*   HGB 7.4* 7.2* 7.0*   HCT 25.7* 24.8* 24.5*   * 553* 626*       Recent Labs     01/10/22  0538 01/11/22  0531 01/12/22  0327   * 132* 133*   K 4.1 4.2 3.9    100 103   CO2 24 22* 22*   CREATININE 1.0 0.9 0.8    90 99   MG 1.7 1.6 1.4*        OXYGEN:  O2 Device (Oxygen Therapy): room air    MEWS score: 4    Bedside Rigoberto CANALES contacted reports patient continues to be noncompliant with LVAD dressing despite several conversations with team. Awaiting PICC line placement for antibiotics. Currently tachycardic 124, but OOB working with PT. No additional concerns verbalized at this time. Instructed to call [phone] for further concerns or assistance.    Tierra Jones RN        "

## 2022-01-12 NOTE — PROGRESS NOTES
[facility] - Cardiology Stepdown  Heart Transplant  Progress Note    Patient Name: Saba Lott  MRN: [id number]  Admission Date: 12/31/2021  Hospital Length of Stay: 12 days  Attending Physician: Santosh Avalos MD  Primary Care Provider: Mary Lombardi MD  Principal Problem:Fever    Subjective:     **Interval History: Out of 9 admits in the past 12 months, 7 have been for worsening infection 2/2 non compliance with home antibiotics regimen. 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 Discussed poor prognosis with patient. He is interested in learning more about Hospice. Palliative Care to see today. T max 99 past 24 hours. WCC still elevated at 15K. Blood cxs 1/4 -ve and 1/7 with NGTD    Continuous Infusions:  Scheduled Meds:   albuterol-ipratropium  3 mL Nebulization Q4H    atorvastatin  20 mg Oral QHS    ceFEPime (MAXIPIME) IVPB EXTENDED INFUSION  2 g Intravenous Q8H    DAPTOmycin (CUBICIN)  IV  10 mg/kg Intravenous Q24H    docusate sodium  100 mg Oral Daily    Lactobacillus rhamnosus GG  1 capsule Oral Daily    lisinopriL  5 mg Oral Daily    magnesium oxide  800 mg Oral TID     PRN Meds:acetaminophen, aluminum & magnesium hydroxide-simethicone, benzonatate, diphenhydrAMINE, guaiFENesin 100 mg/5 ml, ondansetron    Review of patient's allergies indicates:   Allergen Reactions    Iodine and iodide containing products      Objective:     Vital Signs (Most Recent):  Temp: 98.7 °F (37.1 °C) (01/12/22 0715)  Pulse: (!) 129 (01/12/22 1053)  Resp: 18 (01/12/22 0902)  BP: (!) 88/0 (01/12/22 0715)  SpO2: 97 % (01/12/22 0902) Vital Signs (24h Range):  Temp:  [97.5 °F (36.4 °C)-99 °F (37.2 °C)] 98.7 °F (37.1 °C)  Pulse:  [] 129  Resp:  [15-20] 18  SpO2:  [95 %-100 %] 97 %  BP: (70-88)/(0) 88/0     Patient Vitals for the past 72 hrs (Last 3 readings):   Weight   01/12/22 0900 89.1 kg (196 lb 6.9 oz)   01/11/22 0749 88.9 kg (195 lb 15.8 oz)   01/10/22 1000 88.8 kg (195 lb 12.3 oz)     Body  mass index is 30.77 kg/m².      Intake/Output Summary (Last 24 hours) at 1/12/2022 1101  Last data filed at 1/12/2022 0800  Gross per 24 hour   Intake 942 ml   Output 800 ml   Net 142 ml       Hemodynamic Parameters:       Telemetry: ST with PVC's    Physical Exam  Constitutional:       Appearance: Normal appearance.   HENT:      Head: Normocephalic and atraumatic.   Eyes:      Conjunctiva/sclera: Conjunctivae normal.   Neck:      Comments: Neck veins are not elevated  Cardiovascular:      Rate and Rhythm: Regular rhythm. Tachycardia present.      Comments: Smooth VAD hum  Pulmonary:      Effort: Pulmonary effort is normal.      Breath sounds: Normal breath sounds.   Abdominal:      General: Bowel sounds are normal. There is distension.   Musculoskeletal:         General: No swelling. Normal range of motion.      Cervical back: Normal range of motion and neck supple.   Skin:     General: Skin is warm and dry.      Capillary Refill: Capillary refill takes 2 to 3 seconds.   Neurological:      General: No focal deficit present.      Mental Status: He is alert and oriented to person, place, and time.   Psychiatric:         Mood and Affect: Mood normal.         Behavior: Behavior normal.         Thought Content: Thought content normal.         Judgment: Judgment normal.         Significant Labs:  CBC:  Recent Labs   Lab 01/10/22  0538 01/11/22  1031 01/12/22  0327   WBC 13.51* 16.01* 15.73*   RBC 4.17* 4.11* 4.09*   HGB 7.4* 7.2* 7.0*   HCT 25.7* 24.8* 24.5*   * 553* 626*   MCV 62* 60* 60*   MCH 17.7* 17.5* 17.1*   MCHC 28.8* 29.0* 28.6*     BNP:  Recent Labs   Lab 01/07/22  0604 01/10/22  0539 01/12/22  0327   BNP 1,483* 602* 637*     CMP:  Recent Labs   Lab 01/07/22  0604 01/08/22  0541 01/10/22  0538 01/11/22  0531 01/12/22  0327   *   < > 101 90 99   CALCIUM 8.4*   < > 8.5* 8.1* 8.1*   ALBUMIN 2.3*  --  2.1*  --  2.2*   PROT 7.5  --  7.1  --  7.3   *   < > 133* 132* 133*   K 4.8   < > 4.1 4.2 3.9    CO2 22*   < > 24 22* 22*      < > 101 100 103   BUN 8   < > 6 6 5*   CREATININE 1.3   < > 1.0 0.9 0.8   ALKPHOS 87  --  70  --  72   ALT 13  --  19  --  17   AST 20  --  33  --  23   BILITOT 0.8  --  0.6  --  0.6    < > = values in this interval not displayed.      Coagulation:   Recent Labs   Lab 01/10/22  0538 01/11/22  0531 01/12/22 0327   INR 2.3* 2.1* 1.9*   APTT 39.9* 28.2 37.5*     LDH:  Recent Labs   Lab 01/10/22  0538 01/11/22  0531 01/12/22 0327    298* 266*     Microbiology:  Microbiology Results (last 7 days)     Procedure Component Value Units Date/Time    Blood culture [[id number]] Collected: 01/07/22 0604    Order Status: Completed Specimen: Blood Updated: 01/12/22 1012     Blood Culture, Routine No growth after 5 days.    Narrative:      From 2 different sites 30 minutes apart    Blood culture [[id number]] Collected: 01/07/22 0604    Order Status: Completed Specimen: Blood Updated: 01/12/22 1012     Blood Culture, Routine No growth after 5 days.    Narrative:      From 2 different sites 30 minutes apart    Culture, Anaerobe [[id number]] Collected: 01/03/22 1822    Order Status: Completed Specimen: Wound from Abdomen Updated: 01/10/22 0850     Anaerobic Culture No anaerobes isolated    Blood culture [[id number]] Collected: 01/04/22 0425    Order Status: Completed Specimen: Blood Updated: 01/09/22 1012     Blood Culture, Routine No growth after 5 days.    Blood culture [[id number]] Collected: 01/04/22 0420    Order Status: Completed Specimen: Blood Updated: 01/09/22 1012     Blood Culture, Routine No growth after 5 days.    Blood culture [[id number]] Collected: 01/03/22 1723    Order Status: Completed Specimen: Blood Updated: 01/08/22 2012     Blood Culture, Routine No growth after 5 days.    Blood culture [[id number]] Collected: 01/03/22 1723    Order Status: Completed Specimen: Blood Updated: 01/08/22 2012     Blood Culture, Routine No growth after 5 days.    Blood culture [[id number]]   (Abnormal) Collected: 01/02/22 1946    Order Status: Completed Specimen: Blood Updated: 01/08/22 1226     Blood Culture, Routine Gram stain aer bottle: Gram positive cocci in clusters resembling Staph      Positive results previously called      METHICILLIN RESISTANT STAPHYLOCOCCUS AUREUS  ID consult required at [facility].  For susceptibility see order #[id number]      Blood culture [[id number]]  (Abnormal) Collected: 01/02/22 1946    Order Status: Completed Specimen: Blood Updated: 01/08/22 1013     Blood Culture, Routine Gram stain uzma bottle: Gram positive cocci in clusters resembling Staph      Results called to and read back by:Khai Durand RN 01/04/2022  22:54      Gram stain aer bottle: Gram positive cocci       Positive results previously called  01/05/2022     Comment: Previous comment was modified by NEG NEG at 11:38 on 01/05/2022 Gram   stain uzma bottle: Gram positive cocci in clusters resembling   StaphResults called to and read back by:Khai Durand RN 01/04/2022 22:54           METHICILLIN RESISTANT STAPHYLOCOCCUS AUREUS  ID consult required at [facility].  For susceptibility see order #[id number]      Clostridium difficile EIA [[id number]]     Order Status: Canceled Specimen: Stool     Blood culture [[id number]]  (Abnormal)  (Susceptibility) Collected: 01/01/22 0020    Order Status: Completed Specimen: Blood Updated: 01/06/22 1141     Blood Culture, Routine Gram stain aer bottle: Gram positive cocci       Results called to and read back by: DANIEL BLANCHARD RN  01/02/2022  22:36      METHICILLIN RESISTANT STAPHYLOCOCCUS AUREUS  ID consult required at [facility].       Comment: Previous comment was modified by RHONA MELGOZA at 10:51 on 01/06/2022 ID   consult required at [facility].ID   consult required at [facility].         Aerobic culture [[id number]]  (Abnormal)   (Susceptibility) Collected: 01/03/22 1822    Order Status: Completed Specimen: Wound from Abdomen Updated: 01/06/22 1126     Aerobic Bacterial Culture PSEUDOMONAS AERUGINOSA  Few      Blood culture [[id number]]  (Abnormal)  (Susceptibility) Collected: 01/01/22 0020    Order Status: Completed Specimen: Blood Updated: 01/06/22 1052     Blood Culture, Routine Gram stain aer bottle: Gram positive cocci in clusters resembling Staph       Results called to and read back by:DANIEL BLANCHARD 01/02/2022  23:29      METHICILLIN RESISTANT STAPHYLOCOCCUS AUREUS  ID consult required at [facility].  ID consult required at [facility].            I have reviewed all pertinent labs within the past 24 hours.    Estimated Creatinine Clearance: 108.5 mL/min (based on SCr of 0.8 mg/dL).    Diagnostic Results:  I have reviewed all pertinent imaging results/findings within the past 24 hours.    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 Patient is short of breath and uncomfortable at the time of giving history. He reports chills, weakness, cough, sputum production, weight gain, nausea, vomiting and trouble breathing. He went to his PCP office yesterday where his temp was 103 and was sent to the hospital afterwards. His white count is 20 on arrival. He is unable to lay flat. His HR is 102-142 sinus. He needs to be diuresed upon arrival but K is 2.9, ordered IV and PO Kcl, he vomited out oral potassium, IV replacement will be done.       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 On admit  presented with cough, nausea, vomiting, weakness, and temp of 103 F  - Admits to noncompliance with suppressive Doxy PTA  - Blood cultures 1/1 & 1/2 positive for Staph, repeats -ve and blood cx 1/7 with NGTD  - Abdominal culture 1/3 positive for pseudomonas  - Afebrile past 24 hours, WCC still elevated at 15k  - ID following, vanc changed to dapto on 1/1 given hx of VISA/VRSA/MRSA. Cefepime changed to jacoby 1/2 and then jacoby changed to Zosyn 1/6 for Pseudomonas from epigastric wound cx done 1/3. Zosyn changed to Cefepime 1/10 by ID  - CT c/a/p on admit with left basilar ground-glass and airspace opacities with additional slight ground-glass attenuation in the left upper lobe.  Abdomen distended and tender on 1/9 - CT c/a/p repeated 1/9 and shows increased soft tissue thickening about the distal superior mesenteric vasculature with apparent vessel enlargement.  Engorged distal small vessel mesenteric vasculature and surrounding edema/stranding.  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  Differential also includes mesenteric panniculitis however felt less likely. Stable postoperative changes of LVAD placement.  Stable soft tissue induration about the drive line catheter within the ventral abdominal wall.  No focal fluid collection or abscess. Per Int Cards, no intervention can be done  - Long h/o PICC line issues. Will consult IR for tunneled PICC line for home IV antibiotics if patient does not elect to go home with Hospice    Sepsis  -See fever    MRSA bacteremia  -See fever    LVAD (left ventricular assist device) present  -S/P DT HM3 with MVR 2/2/20  -Current speed 5300  -INR goal 1.5-2.0 (hx of ICH). INR trending back down at 1.9 while holding Coumadin (got Vit K 1 mg IV on 1/6 for INR 3.0)  -LDH stable  -ECHO 1/11 at 5300 rpm: LVEDD 6.60, RV not well visualized, interval degeneration of MV, suspect partial flail posterior leaflet, cannot exclude significant MR, mild TR, IVC  15, BRYAN intermittently and septum midline  -Gave Lasix 40 mg IVP yesterday and resumed Lisinopril 5 mg qd given worsening MR. I&O's are not accurate today. As he is eating and drinking very little, with diurese only prn      Procedure: Device Interrogation Including analysis of device parameters  Current Settings: Ventricular Assist Device  Review of device function is stable      TXP LVAD INTERROGATIONS 1/12/2022 1/12/2022 1/12/2022 1/11/2022 1/11/2022 1/11/2022 1/11/2022   Type HeartMate3 HeartMate3 - HeartMate3 HeartMate3 HeartMate3 HeartMate3   Flow 4.8 4.8 4.0 4.9 4.7 4.6 4.8   Speed 5300 5300 5300 5300 5300 5300 5300   PI 3.0 3.4 3.5 2.7 3.1 3.2 3.4   Power (Centeno) 3.8 3.9 3.9 3.8 3.8 3.8 3.9   LSL 4900 4900 4900 4900 4900 4900 4900   Pulsatility Intermittent pulse - - Intermittent pulse Intermittent pulse No Pulse [place] [id number]  Heart Transplant  [facility] - Cardiology Stepdown

## 2022-01-12 NOTE — PLAN OF CARE
Patient free from falls and injuries throughout shift.  AAO and VSS.  Patient denies chest pain and SOB. Dopplers 78/0 - 82/0.  LVAD working WNL; dressing change completed once during shift. Benadryl given for itching. Continues on cefepime and and Daptomycin.  No acute events overnight. Patient resting well at this time.  Plan of care discussed with patient.  Patient verbalizes understanding.  Will continue to monitor.

## 2022-01-12 NOTE — PLAN OF CARE
AAOx4.  No SOB or other distress.  No complaints of pain or otherwise at this time.  Free of falls, traumas, and injuries. At beginning of shift patient noted to be scratching at LVAD dressing; dressing was reinforced and patient educated about leaving dressing intact. Patient again found with no dressing to LVAD this afternoon; he states he does not know how it came off but he does itch and may be scratching it in his sleep. Dressing applied and P.O. Benadryl given. LVAD numbers and dopplers WNL. Vital signs stable.  Plan of care reviewed with patient.  Will continue to monitor.

## 2022-01-12 NOTE — ASSESSMENT & PLAN NOTE
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 On admit presented with cough, nausea, vomiting, weakness, and temp of 103 F  - Admits to noncompliance with suppressive Doxy PTA  - Blood cultures 1/1 & 1/2 positive for Staph, repeats -ve and blood cx 1/7 with NGTD  - Abdominal culture 1/3 positive for pseudomonas  - Afebrile past 24 hours, WCC still elevated at 15k  - ID following, vanc changed to dapto on 1/1 given hx of VISA/VRSA/MRSA. Cefepime changed to jacoby 1/2 and then jacoby changed to Zosyn 1/6 for Pseudomonas from epigastric wound cx done 1/3. Zosyn changed to Cefepime 1/10 by ID  - CT c/a/p on admit with left basilar ground-glass and airspace opacities with additional slight ground-glass attenuation in the left upper lobe.  Abdomen distended and tender on 1/9 - CT c/a/p repeated 1/9 and shows increased soft tissue thickening about the distal superior mesenteric vasculature with apparent vessel enlargement.  Engorged distal small vessel mesenteric vasculature and surrounding edema/stranding.  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  Differential also includes mesenteric panniculitis however felt less likely. Stable postoperative changes of LVAD placement.  Stable soft tissue induration about the drive line catheter within the ventral abdominal wall.  No focal fluid collection or abscess. Per Int Cards, no intervention can be done  - Long h/o PICC line issues. Will consult IR for tunneled PICC line for home IV antibiotics if patient does not elect to go home with Hospice

## 2022-01-12 NOTE — ASSESSMENT & PLAN NOTE
56-year-old male with history of DCM s/p LVAD 2/6/2020, left occipital IPH, lead endocarditis s/p removal, history of VISA and polymicrobial  bacteremia in October (pseudomonas, e coli, proteus.  Strongy negative) on chronic suppressive doxy, presents from clinic with fevers.    Blood cultures of 1/1 - 2/4 bottles + MRSA  Blood cultures 1/2 - 3/4 MRSA  Blood cultures  1/3, 1/4, 1/7  - NGTD   Abdominal wound cx - + Pseudomonas - pan sensitive    CT A/P 1/2/22 - unchanged small region of soft tissue induration and slight skin thickening about the drive line in anterior abdominal wall. No definite focal fluid collection about the LVAD. No definite new regions of soft tissue induration or focal fluid collections are identified about the remaining subcutaneous course of the LVAD driveline  Noted patchy LLL GGO and airspace opacities with additional patchy ground glass attenuation in DEVORAH  Slight non-specific perinephric fat stranding - U/A wnl  Increased soft tissue thickening about the distal superior mesenteric vasculature with apparent vessel enlargement.  Engorged distal small vessel mesenteric vasculature and surrounding edema/stranding. 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  Differential also includes mesenteric panniculitis however felt less likely.     1/11/21: Leukocytosis improving (26K  was down to 13K no trended up to 16). Now afebrile. Patient again states he feels better. COVID negative, RIP panel negative.    Pan sensitive pseudomonas on ABD wound cx but no active sign of infection at DLES and no cultures done  Concern that given MRSA bacteremia, may have endovascular infection if thrombus present in SMV  2D echo done: poor endocardial visualization but MV now shows significant destruction vs prior 2D in 10/2021 - ? Cause/? infection  Epigastric wound healed  Per discussion with HTS - patient not listed    1/12 no AM fever but now febrile.  Stable without sign of  infection when seen this am.  Per discussion with HTS, palliative care consulted for possible hospice.  They report patient has been admitted multiple times due to singificantly poor compliance leading to infection.  THey defer MACIEL given frailty and results would not alter abx plan.      Plan:  1.   Continue IV daptomycin 10 mg/kg for now MRSA bacteremia. Confirmed susceptibility with Micro lab.  KALI <.5.   Weekly CK while on dapto  2.   Continue cefepime for pseudomonas from epigastric abdominal wound   3.   Repeat BCXS given fever, fu C diff test which primary has ordered and palliative care recs.    4.  Continue to monitor fever/wbc trend  5.   Plan discussed with ID staff and HTS  6.  Will follow

## 2022-01-12 NOTE — ASSESSMENT & PLAN NOTE
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 INR trending back down at 1.9 while holding Coumadin (got Vit K 1 mg IV on 1/6 for INR 3.0)  -LDH stable  -ECHO 1/11 at 5300 rpm: LVEDD 6.60, RV not well visualized, interval degeneration of MV, suspect partial flail posterior leaflet, cannot exclude significant MR, mild TR, IVC 15, BRYAN intermittently and septum midline  -Gave Lasix 40 mg IVP yesterday and resumed Lisinopril 5 mg qd given worsening MR. I&O's are not accurate today. As he is eating and drinking very little, with diurese only prn      Procedure: Device Interrogation Including analysis of device parameters  Current Settings: Ventricular Assist Device  Review of device function is stable      TXP LVAD INTERROGATIONS 1/12/2022 1/12/2022 1/12/2022 1/11/2022 1/11/2022 1/11/2022 1/11/2022   Type HeartMate3 HeartMate3 - HeartMate3 HeartMate3 HeartMate3 HeartMate3   Flow 4.8 4.8 4.0 4.9 4.7 4.6 4.8   Speed 5300 5300 5300 5300 5300 5300 5300   PI 3.0 3.4 3.5 2.7 3.1 3.2 3.4   Power (Centeno) 3.8 3.9 3.9 3.8 3.8 3.8 3.9   LSL 4900 4900 4900 4900 4900 4900 4900   Pulsatility Intermittent pulse - - Intermittent pulse Intermittent pulse No Pulse No Pulse

## 2022-01-12 NOTE — PT/OT/SLP PROGRESS
Occupational Therapy   Treatment    Name: Saba Lott  MRN: [id number]  Admitting Diagnosis:  Fever       Recommendations:     Discharge Recommendations: home health PT  Discharge Equipment Recommendations:  none  Barriers to discharge:  None    Assessment:     Saba Lott is a 56 y.o. male with a medical diagnosis of Fever.  He presents with the following performance deficits affecting function are weakness,impaired endurance,impaired functional mobilty,gait instability,impaired balance,impaired cardiopulmonary response to activity,decreased safety awareness. Patient reporting feeling unwell and weak on this date, agreeable to participate and benefited from increased time for rest breaks/safety. Patient will continue to benefit from therapy during this admission per OT POC.    Rehab Prognosis:  Good; patient would benefit from acute skilled OT services to address these deficits and reach maximum level of function.       Plan:     Patient to be seen 3 x/week to address the above listed problems via self-care/home management,therapeutic activities,therapeutic exercises  · Plan of Care Expires: 02/01/22  · Plan of Care Reviewed with: patient    Subjective     Pain/Comfort:  · Pain Rating 1: 0/10  · Pain Rating Post-Intervention 1: 0/10    Objective:   Communicated with: Nscalderon prior to session.  Patient found left sidelying with peripheral IV,telemetry,LVAD upon OT entry to room.    General Precautions: Standard, LVAD,contact,fall   Orthopedic Precautions:N/A   Braces: N/A  Respiratory Status: Room air     Occupational Performance:     Bed Mobility:    · Patient completed Supine to Sit with stand by assistance  · Patient completed Sit to Supine with stand by assistance from left side of bed    Functional Mobility/Transfers:  · Patient completed Sit <> Stand Transfer with contact guard assistance and minimum assistance  with  hand-held assist    · Toilet transfer from toilet with minimal assist with HHA  · Functional  Mobility: Stand by assistance with no AD for lap around unit (450 ft); cues for pacing and SKYLER    Activities of Daily Living:  · Grooming: stand by assistance standing at bathroom sink  · Upper Body Dressing: stand by assistance seated edge of bed to don back gown  · Lower Body Dressing: contact guard assistance seated edge of bed  · Toileting: minimum assistance for stability during scar-care, L UE on bathroom sink ledge and therapist managing gown    [facility] 6 Click ADL: 19    Treatment & Education:  -Patient and family educated on roles/goals of OT and POC.  -White board updated.  -Therapist provided time for questions and answered within scope of practice.  -Patient educated on importance of EOB/OOB activity to maximize recovery.    Patient left supine with all lines intact and call button in reachEducation:      GOALS:   Multidisciplinary Problems     Occupational Therapy Goals        Problem: Occupational Therapy Goal    Goal Priority Disciplines Outcome Interventions   Occupational Therapy Goal     OT, PT/OT Ongoing, Progressing    Description: Goals to be met by: 2/1/22     Patient will increase functional independence with ADLs by performing:    UE Dressing with [place].  LE Dressing with [place].  Grooming while standing at sink with [place].  Toileting from toilet with [place] for hygiene and clothing management.   Bathing from  shower chair/bench with [place].  Toilet transfer to toilet with [place]t.  Increased functional strength to WFL for B UE.  Upper extremity exercise program x15 reps per handout, with independence.                     Time Tracking:     OT Date of Treatment: 01/12/22  OT Start Time: 1017  OT Stop Time: 1053  OT Total Time (min): 36 min    Billable Minutes:Self Care/Home Management 10  Therapeutic Activity 16    OT/KUSHAL: OT     KUSHAL Visit Number: 1 1/12/2022

## 2022-01-12 NOTE — SUBJECTIVE & OBJECTIVE
Palliative Encounter:  Follow up palliative visit to continue goals of care discussion. Pt included his mother, Anna, in discussion by phone.  We reviewed previous GOC discussion and the options for care, including continuation of current treatment (which may mean frequent hospitalizations) vs transition to comfort care/hospice.  Pt expresses that he wants live as long as he can, even if quality of life is decreased. At this time, his goals do not align with hospice.  Pt remains full code/full treatment. This is consistent with goals he has expressed in palliative discussions over the last year.  He could benefit from home-based palliative care for symptom management and ongoing GOC discussions.       Medications:  Continuous Infusions:  Scheduled Meds:   atorvastatin  20 mg Oral QHS    ceFEPime (MAXIPIME) IVPB EXTENDED INFUSION  2 g Intravenous Q8H    DAPTOmycin (CUBICIN)  IV  10 mg/kg Intravenous Q24H    docusate sodium  100 mg Oral Daily    Lactobacillus rhamnosus GG  1 capsule Oral Daily    lisinopriL  5 mg Oral Daily    magnesium oxide  800 mg Oral TID     PRN Meds:acetaminophen, albuterol-ipratropium, aluminum & magnesium hydroxide-simethicone, benzonatate, diphenhydrAMINE, guaiFENesin 100 mg/5 ml, ondansetron    Objective:     Vital Signs (Most Recent):  Temp: 100 °F (37.8 °C) (01/12/22 1453)  Pulse: (!) 116 (01/12/22 1600)  Resp: 18 (01/12/22 1453)  BP: (!) 78/0 (01/12/22 1453)  SpO2: (!) 93 % (01/12/22 1453) Vital Signs (24h Range):  Temp:  [97.5 °F (36.4 °C)-101.4 °F (38.6 °C)] 100 °F (37.8 °C)  Pulse:  [] 116  Resp:  [16-20] 18  SpO2:  [93 %-100 %] 93 %  BP: (70-88)/(0) 78/0     Weight: 89.1 kg (196 lb 6.9 oz)  Body mass index is 30.77 kg/m².    Physical Exam  Vitals and nursing note reviewed.   Constitutional:       General: He is not in acute distress.  HENT:      Head: Normocephalic and atraumatic.      Right Ear: External ear normal.      Left Ear: External ear normal.      Nose: Nose  normal. No congestion or rhinorrhea.   Eyes:      General:         Right eye: No discharge.         Left eye: No discharge.   Cardiovascular:      Rate and Rhythm: Normal rate.   Pulmonary:      Effort: Pulmonary effort is normal. No respiratory distress.   Abdominal:      Palpations: Abdomen is soft.   Musculoskeletal:      Cervical back: Normal range of motion.   Skin:     General: Skin is warm and dry.      Findings: No rash.   Neurological:      Mental Status: He is alert and oriented to person, place, and time.         Palliative Exam    Advance Care Planning   Advance Care Planning       Significant Labs: All pertinent labs within the past 24 hours have been reviewed.  CBC:   Recent Labs   Lab 01/12/22 0327   WBC 15.73*   HGB 7.0*   HCT 24.5*   MCV 60*   *     BMP:  Recent Labs   Lab 01/12/22 0327   GLU 99   *   K 3.9      CO2 22*   BUN 5*   CREATININE 0.8   CALCIUM 8.1*   MG 1.4*     LFT:  Lab Results   Component Value Date    AST 23 01/12/2022    ALKPHOS 72 01/12/2022    BILITOT 0.6 01/12/2022     Albumin:   Albumin   Date Value Ref Range Status   01/12/2022 2.2 (L) 3.5 - 5.2 g/dL Final   05/04/2020 4.0  Final     Protein:   Total Protein   Date Value Ref Range Status   01/12/2022 7.3 6.0 - 8.4 g/dL Final     Lactic acid:   Lab Results   Component Value Date    LACTATE 1.6 01/01/2022    LACTATE 2.1 10/05/2021       Significant Imaging: I have reviewed all pertinent imaging results/findings within the past 24 hours.

## 2022-01-12 NOTE — SUBJECTIVE & OBJECTIVE
Interval History:   No AEON.   Tmax 101.4, Tcurrent 100  WBC 15s - stable  Repeat BCXs 1/7 NGTD  Wound cx epigastric wound - Pan sensitive pseudomonas - patient reports now healed  Reports fever, chills and sweats when seen prior to documented fever.  Denies abdominal pain      Review of Systems   Constitutional: Negative for chills, diaphoresis, fatigue and fever.   Respiratory: Negative for cough and shortness of breath.    Cardiovascular: Negative for chest pain and leg swelling.   Gastrointestinal: Negative for abdominal pain, diarrhea, nausea and vomiting.   Genitourinary: Negative for difficulty urinating, dysuria, flank pain and hematuria.   Musculoskeletal: Negative for arthralgias, back pain, joint swelling, myalgias and neck pain.   Skin: Positive for wound (open wound epigastric area now healed). Negative for rash.   Neurological: Negative for dizziness, weakness and headaches.   Psychiatric/Behavioral: Negative for agitation. The patient is not nervous/anxious.      Objective:     Vital Signs (Most Recent):  Temp: 100 °F (37.8 °C) (01/12/22 1453)  Pulse: (!) 116 (01/12/22 1600)  Resp: 18 (01/12/22 1453)  BP: (!) 78/0 (01/12/22 1453)  SpO2: (!) 93 % (01/12/22 1453) Vital Signs (24h Range):  Temp:  [97.5 °F (36.4 °C)-101.4 °F (38.6 °C)] 100 °F (37.8 °C)  Pulse:  [] 116  Resp:  [16-20] 18  SpO2:  [93 %-99 %] 93 %  BP: (70-88)/(0) 78/0     Weight: 89.1 kg (196 lb 6.9 oz)  Body mass index is 30.77 kg/m².    Estimated Creatinine Clearance: 108.5 mL/min (based on SCr of 0.8 mg/dL).    Physical Exam  Vitals and nursing note reviewed.   Constitutional:       General: He is not in acute distress.     Appearance: He is well-developed. He is obese. He is not ill-appearing, toxic-appearing or diaphoretic.       Cardiovascular:      Rate and Rhythm: Tachycardia present.      Comments: VAD hum  Pulmonary:      Effort: Pulmonary effort is normal. No respiratory distress.      Breath sounds: No wheezing or rales.    Abdominal:      General: There is no distension.      Palpations: Abdomen is soft.      Tenderness: There is no abdominal tenderness. There is no guarding.      Comments: Epigastric wound undressed - no active drainage.  No tenderness, erythema.  Surrounding induration.      DLES site not dressed; no drainage. No erythema   Musculoskeletal:         General: No swelling or tenderness. Normal range of motion.      Cervical back: Normal range of motion.   Skin:     General: Skin is warm and dry.      Findings: No erythema or rash.   Neurological:      Mental Status: He is alert and oriented to person, place, and time.   Psychiatric:         Behavior: Behavior normal.             Significant Labs:   Blood Culture:   Recent Labs   Lab 01/02/22  1946 01/03/22  1723 01/04/22  0420 01/04/22  0425 01/07/22  0604   LABBLOO Gram stain aer bottle: Gram positive cocci in clusters resembling Staph  Positive results previously called  METHICILLIN RESISTANT STAPHYLOCOCCUS AUREUS  ID consult required at [facility].  For susceptibility see order #[id number]  *  Gram stain uzma bottle: Gram positive cocci in clusters resembling Staph  Results called to and read back by:Khai Durand RN 01/04/2022  22:54  Gram stain aer bottle: Gram positive cocci   Positive results previously called  01/05/2022  METHICILLIN RESISTANT STAPHYLOCOCCUS AUREUS  ID consult required at [facility].  For susceptibility see order #[id number]  * No growth after 5 days.  No growth after 5 days. No growth after 5 days. No growth after 5 days. No growth after 5 days.  No growth after 5 days.     CBC:   Recent Labs   Lab 01/11/22  1031 01/12/22  0327   WBC 16.01* 15.73*   HGB 7.2* 7.0*   HCT 24.8* 24.5*   * 626*     CMP:   Recent Labs   Lab 01/11/22  0531 01/12/22  0327   * 133*   K 4.2 3.9    103   CO2 22* 22*   GLU 90 99   BUN 6 5*   CREATININE 0.9 0.8   CALCIUM 8.1* 8.1*    PROT  --  7.3   ALBUMIN  --  2.2*   BILITOT  --  0.6   ALKPHOS  --  72   AST  --  23   ALT  --  17   ANIONGAP 10 8   EGFRNONAA >60.0 >60.0     Wound Culture:   Recent Labs   Lab 07/23/21  1004 08/09/21  1119 08/09/21  1128 10/05/21  1732 01/03/22  1822   LABAERO METHICILLIN RESISTANT STAPHYLOCOCCUS AUREUS  Moderate  * No growth No growth METHICILLIN RESISTANT STAPHYLOCOCCUS AUREUS  Rare  * PSEUDOMONAS AERUGINOSA  Few  *       Significant Imaging: I have reviewed all pertinent imaging results/findings within the past 24 hours.   Transthoracic echo (TTE) complete  Order# [id number]  Reading physician: Roman Varghese MD Ordering physician: Mallika Lugo NP Study date: 1/11/22       Reason for Exam  Priority: Routine  Dx: Complication involving left ventricular assist device (LVAD) [T82.9XXA (ICD-10-CM)]     Result Image Hyperlink     Show images for Echo    Summary    · LVIDd 6.60 cm  · The left ventricle is moderately enlarged with eccentric hypertrophy and severely decreased systolic function.  · There is an LVAD present. Base speed is 5300 RPMs. The pump type is a Heartmate III. The interventricular septum appears midline. The aortic valve opens intermittently.  · Severe left atrial enlargement.  · The estimated ejection fraction is 25%.  · Left ventricular diastolic dysfunction.  · Tachycardia was present  · Normal right ventricular size.  · Right ventricle not well visualized due to poor acoustic window.  · Poor endocardial visualization  · Mild tricuspid regurgitation.  · Elevated central venous pressure (15 mmHg).  · The estimated PA systolic pressure is 46 mmHg.  · There has been interval degeneration of the mitral valve in comparison with prior study, suspect partial flail posterior leaflet, cannot exclude significant mitral regurgitation.          CT Chest Abdomen Pelvis Without Contrast (XPD) [[id number]] (Abnormal) Resulted: 01/09/22 1113   Order Status: Completed Updated: 01/09/22 1115   Narrative:      EXAMINATION:   CT CHEST ABDOMEN PELVIS WITHOUT CONTRAST(XPD)     CLINICAL HISTORY:   LVAD pt, DLES infection;     TECHNIQUE:   Low dose axial images, sagittal and coronal reformations were obtained from the thoracic inlet to the pubic synthesis without contrast     COMPARISON:   CT 01/02/2022     FINDINGS:   Base of neck structures in thoracic soft tissues are within normal limits.     Postoperative changes of LVAD placement.  Unable to assess cannula patency on noncontrast exam.  No evidence of focal fluid collection or abscess.  Stable subcutaneous soft tissue induration about the drive line catheter course within the midline ventral abdominal wall (series 3, image 60).  No detrimental interval change.     Heart is stable in size.  No pericardial effusion.  Thoracic aorta is normal in caliber.     Stable mildly prominent mediastinal lymph nodes.  No new adenopathy.     Prominence of the bilateral hilar vasculature and peribronchial thickening.  Mild interlobular septal thickening within the bilateral upper lobes with patchy ground-glass density.  Findings are suggestive for mild pulmonary edema.  No focal alveolar consolidation.  No significant pleural effusion or pneumothorax.     Liver is normal in size and attenuation.  No focal hepatic lesion detected noting decreased sensitivity on noncontrast exam.  Gallbladder is contracted and contains a calcified stone.  No biliary ductal dilatation.     Spleen is upper limits of normal in size.     Adrenal glands and pancreas are unremarkable.     Stable lobular contour of the right kidney which may represent cortical scarring.  No renal stone.  No hydroureteronephrosis.  Urinary bladder is decompressed.     Small and large bowel are normal caliber.  No evidence of bowel obstruction or inflammation.  Colonic diverticulosis.     Increased soft tissue thickening about the distal superior mesenteric vascular with apparent vessel enlargement (series 3, image 92).   Engorged distal small vessel mesenteric vasculature and surrounding edema/stranding.  Limited evaluation given lack of intravenous contrast.     Normal caliber abdominal aorta.     Fat containing umbilical hernia.     Status post intramedullary damaris fixation of the bilateral femurs.  Degenerative changes of the osseous structures.  Stable peripherally sclerotic lucent lesion within the left iliac bone.  Chronic right rib fractures.  No acute fracture detected.  Sternotomy wires noted.    Impression:       1. Increased soft tissue thickening about the distal superior mesenteric vasculature with apparent vessel enlargement.  Engorged distal small vessel mesenteric vasculature and surrounding edema/stranding.  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  Differential also includes mesenteric panniculitis however felt less likely.   2. Stable postoperative changes of LVAD placement.  Stable soft tissue induration about the drive line catheter within the ventral abdominal wall.  No focal fluid collection or abscess.   3. Mild bilateral interlobular septal thickening and ground-glass density suggestive for mild pulmonary edema.   4. Cholelithiasis.   5. Additional findings as above.   This report was flagged in Epic as abnormal.       Electronically signed by: Jimenez Gardner   Date: 01/09/2022   Time: 11:13   X-Ray Chest AP Portable [[id number]] Resulted: 01/05/22 1320   Order Status: Completed Updated: 01/05/22 1322   Narrative:     EXAMINATION:   XR CHEST AP PORTABLE     CLINICAL HISTORY:   cough;     TECHNIQUE:   Single frontal view of the chest was performed.     COMPARISON:   01/03/2022     FINDINGS:   Stable mild cardiomegaly.     Stable mild pulmonary venous congestion.     The previously depicted lines, tubes, and postsurgical changes appear similar.  The latter include a continuous flow left ventricular assistant device.    Impression:       No significant interval change since  prior exam noted.       Electronically signed by: Christine Dotson   Date: 01/05/2022   Time: 13:20       Imaging History    2022  Date Procedure Name Status Accession Number Location   01/09/22 10:36 AM CT Chest Abdomen Pelvis Without Contrast (XPD) Final [id number] [facility]   01/05/22 12:58 PM X-Ray Chest AP Portable Final [id number] [facility]   01/03/22 05:17 PM X-Ray Chest AP Portable Final [id number] [facility]   01/02/22 08:51 PM CT Chest Abdomen Pelvis Without Contrast (XPD) Final [id number] [facility]   01/02/22 06:54 PM X-Ray Chest AP Portable Final [id number] [facility]   01/01/22 12:06 AM X-Ray Chest 1 View Final [id number] [facility]   01/11/22 09:28 AM Echo Final [id number] [facility]   2021  Date Procedure Name Status Accession Number Location   12/31/21 12:00 AM CARDIAC MONITORING STRIPS Final

## 2022-01-12 NOTE — PROGRESS NOTES
Rounded on patient, resting in bed, AAO, no family/caregiver present. Patient reports feeling well. States he has been getting OOB and walking around unit independently.   Battery in need of calibration, calibrating in UBC slot 2. Notified bedside RN not to use battery until light turns green.   No further needs at this time.

## 2022-01-12 NOTE — PROGRESS NOTES
01/12/2022  Billlars Soto Jr    Current provider:  Santosh Avalos MD    TXP LVAD INTERROGATIONS 1/12/2022 1/12/2022 1/12/2022 1/11/2022 1/11/2022 1/11/2022 1/11/2022   Type HeartMate3 HeartMate3 - HeartMate3 HeartMate3 HeartMate3 HeartMate3   Flow 4.8 4.8 4.0 4.9 4.7 4.6 4.8   Speed 5300 5300 5300 5300 5300 5300 5300   PI 3.0 3.4 3.5 2.7 3.1 3.2 3.4   Power (Centeno) 3.8 3.9 3.9 3.8 3.8 3.8 3.9   LSL 4900 4900 4900 4900 4900 4900 4900   Pulsatility Intermittent pulse - - Intermittent pulse Intermittent pulse No Pulse No Pulse          Rounded on Saba A Oren to ensure all mechanical assist device settings (IABP or VAD) were appropriate and all parameters were within limits.  I was able to ensure all back up equipment was present, the staff had no issues, and the Perfusion Department daily rounding was complete. TTE reveals MV deterioration possibly due to infection.    In emergency, the nursing units have been notified to contact the perfusion department either by:  Calling [phone] from 630am to 4pm Mon thru Fri, or by contacting the hospital  from 4pm to 630am and on weekends and asking to speak with the perfusionist on call.    8:34 AM

## 2022-01-12 NOTE — SUBJECTIVE & OBJECTIVE
**Interval History: Out of 9 admits in the past 12 months, 7 have been for worsening infection 2/2 non compliance with home antibiotics regimen. 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 Discussed poor prognosis with patient. He is interested in learning more about Hospice. Palliative Care to see today. T max 99 past 24 hours. WCC still elevated at 15K. Blood cxs 1/4 -ve and 1/7 with NGTD    Continuous Infusions:  Scheduled Meds:   albuterol-ipratropium  3 mL Nebulization Q4H    atorvastatin  20 mg Oral QHS    ceFEPime (MAXIPIME) IVPB EXTENDED INFUSION  2 g Intravenous Q8H    DAPTOmycin (CUBICIN)  IV  10 mg/kg Intravenous Q24H    docusate sodium  100 mg Oral Daily    Lactobacillus rhamnosus GG  1 capsule Oral Daily    lisinopriL  5 mg Oral Daily    magnesium oxide  800 mg Oral TID     PRN Meds:acetaminophen, aluminum & magnesium hydroxide-simethicone, benzonatate, diphenhydrAMINE, guaiFENesin 100 mg/5 ml, ondansetron    Review of patient's allergies indicates:   Allergen Reactions    Iodine and iodide containing products      Objective:     Vital Signs (Most Recent):  Temp: 98.7 °F (37.1 °C) (01/12/22 0715)  Pulse: (!) 129 (01/12/22 1053)  Resp: 18 (01/12/22 0902)  BP: (!) 88/0 (01/12/22 0715)  SpO2: 97 % (01/12/22 0902) Vital Signs (24h Range):  Temp:  [97.5 °F (36.4 °C)-99 °F (37.2 °C)] 98.7 °F (37.1 °C)  Pulse:  [] 129  Resp:  [15-20] 18  SpO2:  [95 %-100 %] 97 %  BP: (70-88)/(0) 88/0     Patient Vitals for the past 72 hrs (Last 3 readings):   Weight   01/12/22 0900 89.1 kg (196 lb 6.9 oz)   01/11/22 0749 88.9 kg (195 lb 15.8 oz)   01/10/22 1000 88.8 kg (195 lb 12.3 oz)     Body mass index is 30.77 kg/m².      Intake/Output Summary (Last 24 hours) at 1/12/2022 1101  Last data filed at 1/12/2022 0800  Gross per 24 hour   Intake 942 ml   Output 800 ml   Net 142 ml       Hemodynamic Parameters:       Telemetry: ST with PVC's    Physical Exam  Constitutional:       Appearance: Normal  appearance.   HENT:      Head: Normocephalic and atraumatic.   Eyes:      Conjunctiva/sclera: Conjunctivae normal.   Neck:      Comments: Neck veins are not elevated  Cardiovascular:      Rate and Rhythm: Regular rhythm. Tachycardia present.      Comments: Smooth VAD hum  Pulmonary:      Effort: Pulmonary effort is normal.      Breath sounds: Normal breath sounds.   Abdominal:      General: Bowel sounds are normal. There is distension.   Musculoskeletal:         General: No swelling. Normal range of motion.      Cervical back: Normal range of motion and neck supple.   Skin:     General: Skin is warm and dry.      Capillary Refill: Capillary refill takes 2 to 3 seconds.   Neurological:      General: No focal deficit present.      Mental Status: He is alert and oriented to person, place, and time.   Psychiatric:         Mood and Affect: Mood normal.         Behavior: Behavior normal.         Thought Content: Thought content normal.         Judgment: Judgment normal.         Significant Labs:  CBC:  Recent Labs   Lab 01/10/22  0538 01/11/22  1031 01/12/22  0327   WBC 13.51* 16.01* 15.73*   RBC 4.17* 4.11* 4.09*   HGB 7.4* 7.2* 7.0*   HCT 25.7* 24.8* 24.5*   * 553* 626*   MCV 62* 60* 60*   MCH 17.7* 17.5* 17.1*   MCHC 28.8* 29.0* 28.6*     BNP:  Recent Labs   Lab 01/07/22  0604 01/10/22  0539 01/12/22  0327   BNP 1,483* 602* 637*     CMP:  Recent Labs   Lab 01/07/22  0604 01/08/22  0541 01/10/22  0538 01/11/22  0531 01/12/22  0327   *   < > 101 90 99   CALCIUM 8.4*   < > 8.5* 8.1* 8.1*   ALBUMIN 2.3*  --  2.1*  --  2.2*   PROT 7.5  --  7.1  --  7.3   *   < > 133* 132* 133*   K 4.8   < > 4.1 4.2 3.9   CO2 22*   < > 24 22* 22*      < > 101 100 103   BUN 8   < > 6 6 5*   CREATININE 1.3   < > 1.0 0.9 0.8   ALKPHOS 87  --  70  --  72   ALT 13  --  19  --  17   AST 20  --  33  --  23   BILITOT 0.8  --  0.6  --  0.6    < > = values in this interval not displayed.      Coagulation:   Recent Labs   Lab  01/10/22  0538 01/11/22  0531 01/12/22  0327   INR 2.3* 2.1* 1.9*   APTT 39.9* 28.2 37.5*     LDH:  Recent Labs   Lab 01/10/22  0538 01/11/22  0531 01/12/22  0327    298* 266*     Microbiology:  Microbiology Results (last 7 days)     Procedure Component Value Units Date/Time    Blood culture [[id number]] Collected: 01/07/22 0604    Order Status: Completed Specimen: Blood Updated: 01/12/22 1012     Blood Culture, Routine No growth after 5 days.    Narrative:      From 2 different sites 30 minutes apart    Blood culture [[id number]] Collected: 01/07/22 0604    Order Status: Completed Specimen: Blood Updated: 01/12/22 1012     Blood Culture, Routine No growth after 5 days.    Narrative:      From 2 different sites 30 minutes apart    Culture, Anaerobe [[id number]] Collected: 01/03/22 1822    Order Status: Completed Specimen: Wound from Abdomen Updated: 01/10/22 0850     Anaerobic Culture No anaerobes isolated    Blood culture [[id number]] Collected: 01/04/22 0425    Order Status: Completed Specimen: Blood Updated: 01/09/22 1012     Blood Culture, Routine No growth after 5 days.    Blood culture [[id number]] Collected: 01/04/22 0420    Order Status: Completed Specimen: Blood Updated: 01/09/22 1012     Blood Culture, Routine No growth after 5 days.    Blood culture [[id number]] Collected: 01/03/22 1723    Order Status: Completed Specimen: Blood Updated: 01/08/22 2012     Blood Culture, Routine No growth after 5 days.    Blood culture [[id number]] Collected: 01/03/22 1723    Order Status: Completed Specimen: Blood Updated: 01/08/22 2012     Blood Culture, Routine No growth after 5 days.    Blood culture [[id number]]  (Abnormal) Collected: 01/02/22 1946    Order Status: Completed Specimen: Blood Updated: 01/08/22 1226     Blood Culture, Routine Gram stain aer bottle: Gram positive cocci in clusters resembling Staph      Positive results previously called      METHICILLIN RESISTANT STAPHYLOCOCCUS AUREUS  ID consult required  at [facility].  For susceptibility see order #[id number]      Blood culture [[id number]]  (Abnormal) Collected: 01/02/22 1946    Order Status: Completed Specimen: Blood Updated: 01/08/22 1013     Blood Culture, Routine Gram stain uzma bottle: Gram positive cocci in clusters resembling Staph      Results called to and read back by:Khai Durand RN 01/04/2022  22:54      Gram stain aer bottle: Gram positive cocci       Positive results previously called  01/05/2022     Comment: Previous comment was modified by NEG NEG at 11:38 on 01/05/2022 Gram   stain uzma bottle: Gram positive cocci in clusters resembling   StaphResults called to and read back by:Khai Durand RN 01/04/2022 22:54           METHICILLIN RESISTANT STAPHYLOCOCCUS AUREUS  ID consult required at [facility].  For susceptibility see order #[id number]      Clostridium difficile EIA [[id number]]     Order Status: Canceled Specimen: Stool     Blood culture [[id number]]  (Abnormal)  (Susceptibility) Collected: 01/01/22 0020    Order Status: Completed Specimen: Blood Updated: 01/06/22 1141     Blood Culture, Routine Gram stain aer bottle: Gram positive cocci       Results called to and read back by: DANIEL BLANCHARD RN  01/02/2022  22:36      METHICILLIN RESISTANT STAPHYLOCOCCUS AUREUS  ID consult required at [facility].       Comment: Previous comment was modified by RHONA MELGOZA at 10:51 on 01/06/2022 ID   consult required at [facility].ID   consult required at [facility].         Aerobic culture [[id number]]  (Abnormal)  (Susceptibility) Collected: 01/03/22 1822    Order Status: Completed Specimen: Wound from Abdomen Updated: 01/06/22 1126     Aerobic Bacterial Culture PSEUDOMONAS AERUGINOSA  Few      Blood culture [[id number]]  (Abnormal)  (Susceptibility) Collected: 01/01/22 0020    Order Status: Completed Specimen: Blood  Updated: 01/06/22 1052     Blood Culture, Routine Gram stain aer bottle: Gram positive cocci in clusters resembling Staph       Results called to and read back by:DANIEL BLANCHARD 01/02/2022  23:29      METHICILLIN RESISTANT STAPHYLOCOCCUS AUREUS  ID consult required at [facility] and [facility] locations.  ID consult required at [facility] and [facility] locations.            I have reviewed all pertinent labs within the past 24 hours.    Estimated Creatinine Clearance: 108.5 mL/min (based on SCr of 0.8 mg/dL).    Diagnostic Results:  I have reviewed all pertinent imaging results/findings within the past 24 hours.

## 2022-01-12 NOTE — PROGRESS NOTES
"-Pt remains full code/full treatment  -Discussed option of hospice and pt's goals of care do not align with hospice at this time.  He wants to have every available intervention possible as long as he maintains current quality of life (able to take care of himself as he has been).   -Will plan to include his mother, Anna, when she is available by phone      Palliative follow up visit to continue goals of care discussion.  Pt inquired about hospice and we discussed the goals, services, and philosophy of hospice. Pt expresses interest in hospital discharge and initially expressed interest in hospice but his goals of care do not align with hospice.  When discussing the possibility pt would die, he immediately states, "I don't want to die", and says he just does not want to remain in the hospital.  We discussed the impact frequent hospitalizations have had on his quality of life. We discussed options for care, including continuing current treatment vs transition to comfort care and he says that he wants to have all interventions to live longer as long as he can take care of himself in the way that he has been and is able to now.  Pt's mom is very involved in his care and he would like to involve her in GO discussions (Anna, [phone]).  Call made to Anna, no answer.  Will attempt again and continue GOC discussion.     Full palliative note to follow  "

## 2022-01-13 NOTE — PLAN OF CARE
Patient is AAOX4 . Fall precautions in place, call light in reach , bed in lowest position . Patient remained free from fall /injuries/trauma overnight. ST  on telemetry . Patient was febrile this morning , (100.8 ) , PRN tynolol 650 mg given . No chest pain, SOB  HETAL .     When entered in the patient room and assessed LVAD dressing , there was no dressing and exit site was open to the air . When asked patient , said that he is allergic to tape and cannot put it on . Cleaned the site and covered with gauze and small tape . But removed that also . Dressing change done again this morning . Educated patient to not remove dressing . Said he donot have problem with the tape that comes with LVAD dressing kit .   Patient still has loose BM . Plan of care reviewed with the patient . All questions addressed .

## 2022-01-13 NOTE — SUBJECTIVE & OBJECTIVE
**Interval History: Continuing to remove his LVAD dressing due to DLES itching. 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 Discussed poor prognosis with patient. He is interested in learning more about Hospice. Palliative Care to see today. T max 99 past 24 hours. WCC still elevated at 15K. Blood cxs 1/4 -ve and 1/7 with NGTD    Continuous Infusions:  Scheduled Meds:   atorvastatin  20 mg Oral QHS    ceFEPime (MAXIPIME) IVPB EXTENDED INFUSION  2 g Intravenous Q8H    DAPTOmycin (CUBICIN)  IV  10 mg/kg Intravenous Q24H    docusate sodium  100 mg Oral Daily    Lactobacillus rhamnosus GG  1 capsule Oral Daily    lisinopriL  5 mg Oral Daily    magnesium oxide  800 mg Oral TID    warfarin  2.5 mg Oral Daily     PRN Meds:acetaminophen, albuterol-ipratropium, aluminum & magnesium hydroxide-simethicone, benzonatate, diphenhydrAMINE, guaiFENesin 100 mg/5 ml, ondansetron    Review of patient's allergies indicates:   Allergen Reactions    Iodine and iodide containing products      Objective:     Vital Signs (Most Recent):  Temp: 97.9 °F (36.6 °C) (01/13/22 0837)  Pulse: (!) 113 (01/13/22 0837)  Resp: 18 (01/13/22 0837)  BP: (!) 70/0 (01/13/22 1110)  SpO2: 95 % (01/13/22 0837) Vital Signs (24h Range):  Temp:  [97.8 °F (36.6 °C)-101.4 °F (38.6 °C)] 97.9 °F (36.6 °C)  Pulse:  [] 113  Resp:  [16-18] 18  SpO2:  [93 %-98 %] 95 %  BP: (68-82)/(0) 70/0     Patient Vitals for the past 72 hrs (Last 3 readings):   Weight   01/12/22 0900 89.1 kg (196 lb 6.9 oz)   01/11/22 0749 88.9 kg (195 lb 15.8 oz)     Body mass index is 30.77 kg/m².      Intake/Output Summary (Last 24 hours) at 1/13/2022 1140  Last data filed at 1/13/2022 0905  Gross per 24 hour   Intake 1498.22 ml   Output --   Net 1498.22 ml       Hemodynamic Parameters:     Telemetry: ST with PVC's    Physical Exam  Constitutional:       Appearance: Normal appearance.   HENT:      Head: Normocephalic and atraumatic.   Eyes:      Conjunctiva/sclera:  Conjunctivae normal.   Neck:      Comments: Neck veins are not elevated  Cardiovascular:      Rate and Rhythm: Regular rhythm. Tachycardia present.      Comments: Smooth VAD hum  Pulmonary:      Effort: Pulmonary effort is normal.      Breath sounds: Normal breath sounds.   Abdominal:      General: Bowel sounds are normal. There is distension.      Comments: No LVAD dressing present and area where he itched his skin off under the DLES dressing.    Musculoskeletal:         General: No swelling. Normal range of motion.      Cervical back: Normal range of motion and neck supple.   Skin:     General: Skin is warm and dry.      Capillary Refill: Capillary refill takes 2 to 3 seconds.   Neurological:      General: No focal deficit present.      Mental Status: He is alert and oriented to person, place, and time.   Psychiatric:         Mood and Affect: Mood normal.         Behavior: Behavior normal.         Thought Content: Thought content normal.         Judgment: Judgment normal.         Significant Labs:  CBC:  Recent Labs   Lab 01/11/22  1031 01/12/22  0327 01/13/22  0547   WBC 16.01* 15.73* 17.51*   RBC 4.11* 4.09* 4.08*   HGB 7.2* 7.0* 7.3*   HCT 24.8* 24.5* 25.6*   * 626* 547*   MCV 60* 60* 63*   MCH 17.5* 17.1* 17.9*   MCHC 29.0* 28.6* 28.5*     BNP:  Recent Labs   Lab 01/07/22  0604 01/10/22  0539 01/12/22  0327   BNP 1,483* 602* 637*     CMP:  Recent Labs   Lab 01/07/22  0604 01/08/22  0541 01/10/22  0538 01/10/22  0538 01/11/22  0531 01/12/22  0327 01/13/22  0547   *   < > 101   < > 90 99 130*   CALCIUM 8.4*   < > 8.5*   < > 8.1* 8.1* 8.3*   ALBUMIN 2.3*  --  2.1*  --   --  2.2*  --    PROT 7.5  --  7.1  --   --  7.3  --    *   < > 133*   < > 132* 133* 134*   K 4.8   < > 4.1   < > 4.2 3.9 4.6   CO2 22*   < > 24   < > 22* 22* 22*      < > 101   < > 100 103 103   BUN 8   < > 6   < > 6 5* 6   CREATININE 1.3   < > 1.0   < > 0.9 0.8 0.9   ALKPHOS 87  --  70  --   --  72  --    ALT 13  --  19   --   --  17  --    AST 20  --  33  --   --  23  --    BILITOT 0.8  --  0.6  --   --  0.6  --     < > = values in this interval not displayed.      Coagulation:   Recent Labs   Lab 01/11/22 0531 01/12/22 0327 01/13/22 0547   INR 2.1* 1.9* 1.5*   APTT 28.2 37.5* 32.4*     LDH:  Recent Labs   Lab 01/11/22 0531 01/12/22 0327 01/13/22 0547   * 266* 314*     Microbiology:  Microbiology Results (last 7 days)     Procedure Component Value Units Date/Time    Blood culture [[id number]] Collected: 01/12/22 1716    Order Status: Completed Specimen: Blood Updated: 01/13/22 0115     Blood Culture, Routine No Growth to date    Narrative:      From 2 different sites 30 minutes apart  Collection has been rescheduled by PAW at 01/12/2022 16:43 Reason:   labs due for 1642  Collection has been rescheduled by PAW at 01/12/2022 16:43 Reason:   labs due for 1642    Blood culture [[id number]] Collected: 01/12/22 1715    Order Status: Completed Specimen: Blood Updated: 01/13/22 0115     Blood Culture, Routine No Growth to date    Narrative:      From 2 different sites 30 minutes apart  Collection has been rescheduled by PAW at 01/12/2022 16:43 Reason:   labs due for 1642  Collection has been rescheduled by PAW at 01/12/2022 16:43 Reason:   labs due for 1642    Clostridium difficile EIA [[id number]]     Order Status: No result Specimen: Stool     Blood culture [[id number]] Collected: 01/07/22 0604    Order Status: Completed Specimen: Blood Updated: 01/12/22 1012     Blood Culture, Routine No growth after 5 days.    Narrative:      From 2 different sites 30 minutes apart    Blood culture [[id number]] Collected: 01/07/22 0604    Order Status: Completed Specimen: Blood Updated: 01/12/22 1012     Blood Culture, Routine No growth after 5 days.    Narrative:      From 2 different sites 30 minutes apart    Culture, Anaerobe [[id number]] Collected: 01/03/22 1822    Order Status: Completed Specimen: Wound from Abdomen Updated: 01/10/22 0850      Anaerobic Culture No anaerobes isolated    Blood culture [[id number]] Collected: 01/04/22 0425    Order Status: Completed Specimen: Blood Updated: 01/09/22 1012     Blood Culture, Routine No growth after 5 days.    Blood culture [[id number]] Collected: 01/04/22 0420    Order Status: Completed Specimen: Blood Updated: 01/09/22 1012     Blood Culture, Routine No growth after 5 days.    Blood culture [[id number]] Collected: 01/03/22 1723    Order Status: Completed Specimen: Blood Updated: 01/08/22 2012     Blood Culture, Routine No growth after 5 days.    Blood culture [[id number]] Collected: 01/03/22 1723    Order Status: Completed Specimen: Blood Updated: 01/08/22 2012     Blood Culture, Routine No growth after 5 days.    Blood culture [[id number]]  (Abnormal) Collected: 01/02/22 1946    Order Status: Completed Specimen: Blood Updated: 01/08/22 1226     Blood Culture, Routine Gram stain aer bottle: Gram positive cocci in clusters resembling Staph      Positive results previously called      METHICILLIN RESISTANT STAPHYLOCOCCUS AUREUS  ID consult required at [facility].  For susceptibility see order #[id number]      Blood culture [[id number]]  (Abnormal) Collected: 01/02/22 1946    Order Status: Completed Specimen: Blood Updated: 01/08/22 1013     Blood Culture, Routine Gram stain uzma bottle: Gram positive cocci in clusters resembling Staph      Results called to and read back by:Khai Durand RN 01/04/2022  22:54      Gram stain aer bottle: Gram positive cocci       Positive results previously called  01/05/2022     Comment: Previous comment was modified by NEG NEG at 11:38 on 01/05/2022 Gram   stain uzma bottle: Gram positive cocci in clusters resembling   StaphResults called to and read back by:Khai Durand RN 01/04/2022 22:54           METHICILLIN RESISTANT STAPHYLOCOCCUS AUREUS  ID consult required at [facility].  For susceptibility see order #[id number]       Clostridium difficile EIA [[id number]]     Order Status: Canceled Specimen: Stool     Blood culture [[id number]]  (Abnormal)  (Susceptibility) Collected: 01/01/22 0020    Order Status: Completed Specimen: Blood Updated: 01/06/22 1141     Blood Culture, Routine Gram stain aer bottle: Gram positive cocci       Results called to and read back by: DANIEL BLANCHARD RN  01/02/2022  22:36      METHICILLIN RESISTANT STAPHYLOCOCCUS AUREUS  ID consult required at [facility].       Comment: Previous comment was modified by RHONA MELGOZA at 10:51 on 01/06/2022 ID   consult required at [facility].ID   consult required at [facility].               I have reviewed all pertinent labs within the past 24 hours.    Estimated Creatinine Clearance: 96.4 mL/min (based on SCr of 0.9 mg/dL).    Diagnostic Results:  I have reviewed all pertinent imaging results/findings within the past 24 hours.

## 2022-01-13 NOTE — ASSESSMENT & PLAN NOTE
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 INR trending back down at 1.5 while holding Coumadin (got Vit K 1 mg IV on 1/6 for INR 3.0)  -LDH stable  -ECHO 1/11 at 5300 rpm: LVEDD 6.60, RV not well visualized, interval degeneration of MV, suspect partial flail posterior leaflet, cannot exclude significant MR, mild TR, IVC 15, BRYAN intermittently and septum midline  -Gave Lasix 40 mg IVP yesterday and resumed Lisinopril 5 mg qd given worsening MR. I&O's are not accurate today. As he is eating and drinking very little, with diurese only prn      Procedure: Device Interrogation Including analysis of device parameters  Current Settings: Ventricular Assist Device  Review of device function is stable      TXP LVAD INTERROGATIONS 1/13/2022 1/13/2022 1/13/2022 1/12/2022 1/12/2022 1/12/2022 1/12/2022   Type HeartMate3 HeartMate3 HeartMate3 HeartMate3 HeartMate3 HeartMate3 HeartMate3   Flow 5.0 4.9 4.9 4.8 4.9 5.0 4.7   Speed 5300 5300 5350 5300 5300 5300 5300   PI 2.8 2.9 2.5 3.1 2.4 2.8 3.0   Power (Centeno) 3.8 3.8 3.9 3.8 3.8 3.8 3.8   LSL - 4900 4900 4900 4900 4900 4900   Pulsatility - - Intermittent pulse Intermittent pulse Intermittent pulse Intermittent pulse Intermittent pulse

## 2022-01-13 NOTE — PROGRESS NOTES
[facility] - [facility]iology Stepdown  Heart Transplant  Progress Note    Patient Name: Saba Lott  MRN: [id number]  Admission Date: 12/31/2021  Hospital Length of Stay: 13 days  Attending Physician: Santosh Avalos MD  Primary Care Provider: Mary Lombardi MD  Principal Problem:Fever    Subjective:     **Interval History: Continuing to remove his LVAD dressing due to DLES itching. 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 Discussed poor prognosis with patient. He is interested in learning more about Hospice. Palliative Care to see today. T max 99 past 24 hours. WCC still elevated at 15K. Blood cxs 1/4 -ve and 1/7 with NGTD    Continuous Infusions:  Scheduled Meds:   atorvastatin  20 mg Oral QHS    ceFEPime (MAXIPIME) IVPB EXTENDED INFUSION  2 g Intravenous Q8H    DAPTOmycin (CUBICIN)  IV  10 mg/kg Intravenous Q24H    docusate sodium  100 mg Oral Daily    Lactobacillus rhamnosus GG  1 capsule Oral Daily    lisinopriL  5 mg Oral Daily    magnesium oxide  800 mg Oral TID    warfarin  2.5 mg Oral Daily     PRN Meds:acetaminophen, albuterol-ipratropium, aluminum & magnesium hydroxide-simethicone, benzonatate, diphenhydrAMINE, guaiFENesin 100 mg/5 ml, ondansetron    Review of patient's allergies indicates:   Allergen Reactions    Iodine and iodide containing products      Objective:     Vital Signs (Most Recent):  Temp: 97.9 °F (36.6 °C) (01/13/22 0837)  Pulse: (!) 113 (01/13/22 0837)  Resp: 18 (01/13/22 0837)  BP: (!) 70/0 (01/13/22 1110)  SpO2: 95 % (01/13/22 0837) Vital Signs (24h Range):  Temp:  [97.8 °F (36.6 °C)-101.4 °F (38.6 °C)] 97.9 °F (36.6 °C)  Pulse:  [] 113  Resp:  [16-18] 18  SpO2:  [93 %-98 %] 95 %  BP: (68-82)/(0) 70/0     Patient Vitals for the past 72 hrs (Last 3 readings):   Weight   01/12/22 0900 89.1 kg (196 lb 6.9 oz)   01/11/22 0749 88.9 kg (195 lb 15.8 oz)     Body mass index is 30.77 kg/m².      Intake/Output Summary (Last 24 hours) at 1/13/2022 1140  Last  data filed at 1/13/2022 0905  Gross per 24 hour   Intake 1498.22 ml   Output --   Net 1498.22 ml       Hemodynamic Parameters:     Telemetry: ST with PVC's    Physical Exam  Constitutional:       Appearance: Normal appearance.   HENT:      Head: Normocephalic and atraumatic.   Eyes:      Conjunctiva/sclera: Conjunctivae normal.   Neck:      Comments: Neck veins are not elevated  Cardiovascular:      Rate and Rhythm: Regular rhythm. Tachycardia present.      Comments: Smooth VAD hum  Pulmonary:      Effort: Pulmonary effort is normal.      Breath sounds: Normal breath sounds.   Abdominal:      General: Bowel sounds are normal. There is distension.      Comments: No LVAD dressing present and area where he itched his skin off under the DLES dressing.    Musculoskeletal:         General: No swelling. Normal range of motion.      Cervical back: Normal range of motion and neck supple.   Skin:     General: Skin is warm and dry.      Capillary Refill: Capillary refill takes 2 to 3 seconds.   Neurological:      General: No focal deficit present.      Mental Status: He is alert and oriented to person, place, and time.   Psychiatric:         Mood and Affect: Mood normal.         Behavior: Behavior normal.         Thought Content: Thought content normal.         Judgment: Judgment normal.         Significant Labs:  CBC:  Recent Labs   Lab 01/11/22  1031 01/12/22  0327 01/13/22  0547   WBC 16.01* 15.73* 17.51*   RBC 4.11* 4.09* 4.08*   HGB 7.2* 7.0* 7.3*   HCT 24.8* 24.5* 25.6*   * 626* 547*   MCV 60* 60* 63*   MCH 17.5* 17.1* 17.9*   MCHC 29.0* 28.6* 28.5*     BNP:  Recent Labs   Lab 01/07/22  0604 01/10/22  0539 01/12/22  0327   BNP 1,483* 602* 637*     CMP:  Recent Labs   Lab 01/07/22  0604 01/08/22  0541 01/10/22  0538 01/10/22  0538 01/11/22  0531 01/12/22  0327 01/13/22  0547   *   < > 101   < > 90 99 130*   CALCIUM 8.4*   < > 8.5*   < > 8.1* 8.1* 8.3*   ALBUMIN 2.3*  --  2.1*  --   --  2.2*  --    PROT 7.5   --  7.1  --   --  7.3  --    *   < > 133*   < > 132* 133* 134*   K 4.8   < > 4.1   < > 4.2 3.9 4.6   CO2 22*   < > 24   < > 22* 22* 22*      < > 101   < > 100 103 103   BUN 8   < > 6   < > 6 5* 6   CREATININE 1.3   < > 1.0   < > 0.9 0.8 0.9   ALKPHOS 87  --  70  --   --  72  --    ALT 13  --  19  --   --  17  --    AST 20  --  33  --   --  23  --    BILITOT 0.8  --  0.6  --   --  0.6  --     < > = values in this interval not displayed.      Coagulation:   Recent Labs   Lab 01/11/22 0531 01/12/22 0327 01/13/22 0547   INR 2.1* 1.9* 1.5*   APTT 28.2 37.5* 32.4*     LDH:  Recent Labs   Lab 01/11/22 0531 01/12/22 0327 01/13/22  0547   * 266* 314*     Microbiology:  Microbiology Results (last 7 days)     Procedure Component Value Units Date/Time    Blood culture [[id number]] Collected: 01/12/22 1716    Order Status: Completed Specimen: Blood Updated: 01/13/22 0115     Blood Culture, Routine No Growth to date    Narrative:      From 2 different sites 30 minutes apart  Collection has been rescheduled by PAW at 01/12/2022 16:43 Reason:   labs due for 1642  Collection has been rescheduled by PAW at 01/12/2022 16:43 Reason:   labs due for 1642    Blood culture [[id number]] Collected: 01/12/22 1715    Order Status: Completed Specimen: Blood Updated: 01/13/22 0115     Blood Culture, Routine No Growth to date    Narrative:      From 2 different sites 30 minutes apart  Collection has been rescheduled by PAW at 01/12/2022 16:43 Reason:   labs due for 1642  Collection has been rescheduled by PAW at 01/12/2022 16:43 Reason:   labs due for 1642    Clostridium difficile EIA [[id number]]     Order Status: No result Specimen: Stool     Blood culture [[id number]] Collected: 01/07/22 0604    Order Status: Completed Specimen: Blood Updated: 01/12/22 1012     Blood Culture, Routine No growth after 5 days.    Narrative:      From 2 different sites 30 minutes apart    Blood culture [[id number]] Collected: 01/07/22 0604     Order Status: Completed Specimen: Blood Updated: 01/12/22 1012     Blood Culture, Routine No growth after 5 days.    Narrative:      From 2 different sites 30 minutes apart    Culture, Anaerobe [[id number]] Collected: 01/03/22 1822    Order Status: Completed Specimen: Wound from Abdomen Updated: 01/10/22 0850     Anaerobic Culture No anaerobes isolated    Blood culture [[id number]] Collected: 01/04/22 0425    Order Status: Completed Specimen: Blood Updated: 01/09/22 1012     Blood Culture, Routine No growth after 5 days.    Blood culture [[id number]] Collected: 01/04/22 0420    Order Status: Completed Specimen: Blood Updated: 01/09/22 1012     Blood Culture, Routine No growth after 5 days.    Blood culture [[id number]] Collected: 01/03/22 1723    Order Status: Completed Specimen: Blood Updated: 01/08/22 2012     Blood Culture, Routine No growth after 5 days.    Blood culture [[id number]] Collected: 01/03/22 1723    Order Status: Completed Specimen: Blood Updated: 01/08/22 2012     Blood Culture, Routine No growth after 5 days.    Blood culture [[id number]]  (Abnormal) Collected: 01/02/22 1946    Order Status: Completed Specimen: Blood Updated: 01/08/22 1226     Blood Culture, Routine Gram stain aer bottle: Gram positive cocci in clusters resembling Staph      Positive results previously called      METHICILLIN RESISTANT STAPHYLOCOCCUS AUREUS  ID consult required at [facility],[place] and [facility] locations.  For susceptibility see order #[id number]      Blood culture [[id number]]  (Abnormal) Collected: 01/02/22 1946    Order Status: Completed Specimen: Blood Updated: 01/08/22 1013     Blood Culture, Routine Gram stain uzma bottle: Gram positive cocci in clusters resembling Staph      Results called to and read back by:Khai Durand RN 01/04/2022  22:54      Gram stain aer bottle: Gram positive cocci       Positive results previously called  01/05/2022     Comment: Previous comment was modified by NEG NEG at 11:38 on 01/05/2022  Gram   stain uzma bottle: Gram positive cocci in clusters resembling   StaphResults called to and read back by:Khai Durand RN 01/04/2022 22:54           METHICILLIN RESISTANT STAPHYLOCOCCUS AUREUS  ID consult required at [facility].  For susceptibility see order #[id number]      Clostridium difficile EIA [[id number]]     Order Status: Canceled Specimen: Stool     Blood culture [[id number]]  (Abnormal)  (Susceptibility) Collected: 01/01/22 0020    Order Status: Completed Specimen: Blood Updated: 01/06/22 1141     Blood Culture, Routine Gram stain aer bottle: Gram positive cocci       Results called to and read back by: DANIEL BLANCHARD RN  01/02/2022  22:36      METHICILLIN RESISTANT STAPHYLOCOCCUS AUREUS  ID consult required at [facility].       Comment: Previous comment was modified by RHONA MELGOZA at 10:51 on 01/06/2022 ID   consult required at [facility].ID   consult required at [facility].               I have reviewed all pertinent labs within the past 24 hours.    Estimated Creatinine Clearance: 96.4 mL/min (based on SCr of 0.9 mg/dL).    Diagnostic Results:  I have reviewed all pertinent imaging results/findings within the past 24 hours.    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 Patient is short of breath and uncomfortable at the time of giving history. He reports chills, weakness, cough, sputum production, weight gain, nausea, vomiting and trouble breathing. He went to his PCP office yesterday where his temp was 103 and was sent to the hospital afterwards. His white count is 20 on arrival. He is unable to lay flat. His HR is 102-142  sinus. He needs to be diuresed upon arrival but K is 2.9, ordered IV and PO Kcl, he vomited out oral potassium, IV replacement will be done.       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 On admit presented with cough, nausea, vomiting, weakness, and temp of 103 F  - Admits to noncompliance with suppressive Doxy PTA  - Blood cultures 1/1 & 1/2 positive for Staph, repeats -ve and blood cx 1/7 with NGTD  - Abdominal culture 1/3 positive for pseudomonas  - Spiked fever again last night Tmax 101, WCC still elevated at 17k  - ID following, vanc changed to dapto on 1/1 given hx of VISA/VRSA/MRSA. Cefepime changed to jacoby 1/2 and then jacoby changed to Zosyn 1/6 for Pseudomonas from epigastric wound cx done 1/3. Zosyn changed to Cefepime 1/10 by ID. Switching back to Zosyn today. (Current antibiotics = daptomycin + Zosyn).   - ID testing fungitell assay today.   - CT c/a/p on admit with left basilar ground-glass and airspace opacities with additional slight ground-glass attenuation in the left upper lobe.  Abdomen distended and tender on 1/9 - CT c/a/p repeated 1/9 and shows increased soft tissue thickening about the distal superior mesenteric vasculature with apparent vessel enlargement.  Engorged distal small vessel mesenteric vasculature and surrounding edema/stranding.  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  Differential also includes mesenteric panniculitis however felt less likely. Stable postoperative changes of LVAD placement.  Stable soft tissue induration about the drive line catheter within the ventral abdominal wall.  No focal fluid collection or abscess. Per Int Cards, no intervention can be done  - Long h/o PICC line issues. Will consult IR for tunneled PICC line for home IV antibiotics if patient does not elect to go home with  Hospice. Will wait until tomorrow for tunnelled cath to make sure latest BCx's (1/12) are clear x 48 hr.     Sepsis  -See fever    Complication involving left ventricular assist device (LVAD)  Procedure: Device Interrogation Including analysis of device parameters  Patient continuing to remove his driveline dressing due to severe itching. Will try calmoseptine topical and scheduled benadryl to reduce itching. Can also try soap and water dressing changes instead of chlorhexidine while inpatient.   Current Settings: Ventricular Assist Device  Review of device function is stable/unstable    TXP LVAD INTERROGATIONS 1/13/2022 1/13/2022 1/13/2022 1/12/2022 1/12/2022 1/12/2022 1/12/2022   Type HeartMate3 HeartMate3 HeartMate3 HeartMate3 HeartMate3 HeartMate3 HeartMate3   Flow 5.0 4.9 4.9 4.8 4.9 5.0 4.7   Speed 5300 5300 5350 5300 5300 5300 5300   PI 2.8 2.9 2.5 3.1 2.4 2.8 3.0   Power (Centeno) 3.8 3.8 3.9 3.8 3.8 3.8 3.8   LSL - 4900 4900 4900 4900 4900 4900   Pulsatility - - Intermittent pulse Intermittent pulse Intermittent pulse Intermittent pulse Intermittent pulse       MRSA bacteremia  -See fever    LVAD (left ventricular assist device) present  -S/P DT HM3 with MVR 2/2/20  -Current speed 5300  -INR goal 1.5-2.0 (hx of ICH). INR trending back down at 1.5 while holding Coumadin (got Vit K 1 mg IV on 1/6 for INR 3.0)  -LDH stable  -ECHO 1/11 at 5300 rpm: LVEDD 6.60, RV not well visualized, interval degeneration of MV, suspect partial flail posterior leaflet, cannot exclude significant MR, mild TR, IVC 15, BRYAN intermittently and septum midline  -Gave Lasix 40 mg IVP yesterday and resumed Lisinopril 5 mg qd given worsening MR. I&O's are not accurate today. As he is eating and drinking very little, with diurese only prn      Procedure: Device Interrogation Including analysis of device parameters  Current Settings: Ventricular Assist Device  Review of device function is stable      TXP LVAD INTERROGATIONS 1/13/2022  1/13/2022 1/13/2022 1/12/2022 1/12/2022 1/12/2022 1/12/2022   Type HeartMate3 HeartMate3 HeartMate3 HeartMate3 HeartMate3 HeartMate3 HeartMate3   Flow 5.0 4.9 4.9 4.8 4.9 5.0 4.7   Speed 5300 5300 5350 5300 5300 5300 5300   PI 2.8 2.9 2.5 3.1 2.4 2.8 3.0   Power (Centeno) 3.8 3.8 3.9 3.8 3.8 3.8 3.8   LSL - 4900 4900 4900 4900 4900 4900   Pulsatility - - Intermittent pulse Intermittent pulse Intermittent pulse Intermittent pulse Intermittent pulse           Olivier Melton PA-C  Heart Transplant  [facility] - Cardiology Stepdown

## 2022-01-13 NOTE — ASSESSMENT & PLAN NOTE
56-year-old male with history of DCM s/p LVAD 2/6/2020, left occipital IPH, lead endocarditis s/p removal, history of VISA and polymicrobial  bacteremia in October (pseudomonas, e coli, proteus.  Strongy negative) on chronic suppressive doxy, presents from clinic with fevers.    Blood cultures of 1/1 - 2/4 bottles + MRSA  Blood cultures 1/2 - 3/4 MRSA  Blood cultures  1/3, 1/4, 1/7, 1/12 - NGTD   Abdominal wound cx - + Pseudomonas - pan sensitive    CT A/P 1/2/22 - unchanged small region of soft tissue induration and slight skin thickening about the drive line in anterior abdominal wall. No definite focal fluid collection about the LVAD. No definite new regions of soft tissue induration or focal fluid collections are identified about the remaining subcutaneous course of the LVAD driveline  Noted patchy LLL GGO and airspace opacities with additional patchy ground glass attenuation in DEVORAH  Slight non-specific perinephric fat stranding - U/A wnl    CT A/P 1/9 -  Persistent soft tissue induration about the drive line within the ventral abdominal wall.  No focal fluid collection or abscess.     Increased soft tissue thickening about the distal superior mesenteric vasculature with apparent vessel enlargement.  Engorged distal small vessel mesenteric vasculature and surrounding edema/stranding concerning for possible SMV thrombosis (limited assessment on noncontrast exam). Differential also includes mesenteric panniculitis however felt less likely.      Arellano sensitive pseudomonas on ABD wound cx - though wound now appears healed.  No drainage or signs of infection at DLES.       2D echo done: poor endocardial visualization but MV now shows significant destruction vs prior 2D in 10/2021 and possible flail leaflet.  MACIEL deferred by HTS given frailty and would not change antibiotic plan.      T max 101.4. WBC slowly uptrending.  HDS.  C dif pending, not collected.  Most recent repeat blood cultures NGTD.  Concern for  possible endovascular infection if thrombus in SMV.  Concern that given MRSA bacteremia, may have endovascular infection if thrombus present in SMV (seeded thrombus) - ? Source of fevers, though blood cultures have cleared.      Given recurrent fever, will broaden cefepime to zosyn (add anaerobic coverage, continue to cover pseudomonas) and continue daptomycin.  Will consider adding fluconazole. Will defer for now and check fungitell.   Reportedly has been having diarrhea, but nurse has been unable to evaluate and collect stool (patient has been flushing stool) for C dif - still pending.        Palliative Care has met with patient.  Reaffirms patient's desire to be full code/continue full treatment.  Hospice does not align with goals of care currently.  Patient verbalizing he feels better today, though is anxious to go home.     Plan:  1.   Continue IV daptomycin 10 mg/kg for now MRSA bacteremia. Confirmed susceptibility with Micro lab.  KALI <.5.   Weekly CK while on dapto  2.   Discontinued cefepime and resumed zosyn 4.5g EI q 8 hours  3.  Follow up repeat blood cultures, c dif  4.  Fungitell ordered.   5.  Will follow up tomorrow.     Data reviewed and plan discussed with ID staff, Dr. Guerrier  Discussed above plan with Primary Team, CONG Lehman

## 2022-01-13 NOTE — PROGRESS NOTES
01/13/2022  Billlars Soto Jr    Current provider:  Santosh Avalos MD    TXP LVAD INTERROGATIONS 1/13/2022 1/13/2022 1/12/2022 1/12/2022 1/12/2022 1/12/2022 1/12/2022   Type HeartMate3 HeartMate3 HeartMate3 HeartMate3 HeartMate3 HeartMate3 HeartMate3   Flow 4.9 4.9 4.8 4.9 5.0 4.7 4.8   Speed 5300 5350 5300 5300 5300 5300 5300   PI 2.9 2.5 3.1 2.4 2.8 3.0 3.0   Power (Centeno) 3.8 3.9 3.8 3.8 3.8 3.8 3.8   LSL 4900 4900 4900 4900 4900 4900 4900   Pulsatility - Intermittent pulse Intermittent pulse Intermittent pulse Intermittent pulse Intermittent pulse Intermittent pulse          Rounded on Saba A Oren to ensure all mechanical assist device settings (IABP or VAD) were appropriate and all parameters were within limits.  I was able to ensure all back up equipment was present, the staff had no issues, and the Perfusion Department daily rounding was complete.    In emergency, the nursing units have been notified to contact the perfusion department either by:  Calling [phone] from 630am to 4pm Mon thru Fri, or by contacting the hospital  from 4pm to 630am and on weekends and asking to speak with the perfusionist on call.    8:57 AM

## 2022-01-13 NOTE — PROGRESS NOTES
[facility] - Cardiology Stepdown  Infectious Disease  Progress Note    Patient Name: Saba Lott  MRN: [id number]  Admission Date: 12/31/2021  Length of Stay: 13 days  Attending Physician: Santosh Avalos MD  Primary Care Provider: Mary Lombardi MD    Isolation Status: Special Contact  Assessment/Plan:      * Fever     56-year-old male with history of DCM s/p LVAD 2/6/2020, left occipital IPH, lead endocarditis s/p removal, history of VISA and polymicrobial  bacteremia in October (pseudomonas, e coli, proteus.  Strongy negative) on chronic suppressive doxy, presents from clinic with fevers.    Blood cultures of 1/1 - 2/4 bottles + MRSA  Blood cultures 1/2 - 3/4 MRSA  Blood cultures  1/3, 1/4, 1/7, 1/12 - NGTD   Abdominal wound cx - + Pseudomonas - pan sensitive    CT A/P 1/2/22 - unchanged small region of soft tissue induration and slight skin thickening about the drive line in anterior abdominal wall. No definite focal fluid collection about the LVAD. No definite new regions of soft tissue induration or focal fluid collections are identified about the remaining subcutaneous course of the LVAD driveline  Noted patchy LLL GGO and airspace opacities with additional patchy ground glass attenuation in DEVORAH  Slight non-specific perinephric fat stranding - U/A wnl    CT A/P 1/9 -  Persistent soft tissue induration about the drive line within the ventral abdominal wall.  No focal fluid collection or abscess.     Increased soft tissue thickening about the distal superior mesenteric vasculature with apparent vessel enlargement.  Engorged distal small vessel mesenteric vasculature and surrounding edema/stranding concerning for possible SMV thrombosis (limited assessment on noncontrast exam). Differential also includes mesenteric panniculitis however felt less likely.      Arellano sensitive pseudomonas on ABD wound cx - though wound now appears healed.  No drainage or signs of infection at DLES.       2D echo done: poor  endocardial visualization but MV now shows significant destruction vs prior 2D in 10/2021 and possible flail leaflet.  MACIEL deferred by HTS given frailty and would not change antibiotic plan.      T max 101.4. WBC slowly uptrending.  HDS.  C dif pending, not collected.  Most recent repeat blood cultures NGTD.  Concern for possible endovascular infection if thrombus in SMV.  Concern that given MRSA bacteremia, may have endovascular infection if thrombus present in SMV (seeded thrombus) - ? Source of fevers, though blood cultures have cleared.      Given recurrent fever, will broaden cefepime to zosyn (add anaerobic coverage, continue to cover pseudomonas) and continue daptomycin.  Will consider adding fluconazole. Will defer for now and check fungitell.   Reportedly has been having diarrhea, but nurse has been unable to evaluate and collect stool (patient has been flushing stool) for C dif - still pending.        Palliative Care has met with patient.  Reaffirms patient's desire to be full code/continue full treatment.  Hospice does not align with goals of care currently.  Patient verbalizing he feels better today, though is anxious to go home.     Plan:  1.   Continue IV daptomycin 10 mg/kg for now MRSA bacteremia. Confirmed susceptibility with Micro lab.  KALI <.5.   Weekly CK while on dapto  2.   Discontinued cefepime and resumed zosyn 4.5g EI q 8 hours  3.  Follow up repeat blood cultures, c dif  4.  Fungitell ordered.   5.  Will follow up tomorrow.     Data reviewed and plan discussed with ID staff, Dr. Guerrier  Discussed above plan with Primary Team, CONG Lehman     MRSA bacteremia  See assessment/plan above          Thank you.   Please call for any questions or concerns.  Lindsay Rowan, APRN, ANP-C  Spectra [phone]  Subjective:     Principal Problem:Fever    HPI: Mr. 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 Pt reports feeling weak since day prior, fevers, chills, diarrhea and vomiting x 1. Denies sore throat, HA, abdominal pain, dysuria, sob or cough. No sick contacts. No increased drainage from DLES. Lives alone.    In the ED pt febrile, tachycardic and with WBC 20. CXR with b/l edema.           Interval History:   No AEON.   T max 101.4  WBC trending up 17  Repeat BCXs 1/7 and 1/12 NGTD  No complaints today.  Reports he is anxious to go home    Review of Systems   Constitutional: Negative for chills, diaphoresis, fatigue and fever.   Respiratory: Negative for cough and shortness of breath.    Cardiovascular: Negative for chest pain and leg swelling.   Gastrointestinal: Negative for abdominal pain, diarrhea, nausea and vomiting.   Genitourinary: Negative for difficulty urinating, dysuria, flank pain and hematuria.   Musculoskeletal: Negative for arthralgias, back pain, joint swelling, myalgias and neck pain.   Skin: Positive for wound (open wound epigastric area now healed). Negative for rash.   Neurological: Negative for dizziness, weakness, numbness and headaches.   Psychiatric/Behavioral: Negative for agitation. The patient is not nervous/anxious.      Objective:     Vital Signs (Most Recent):  Temp: 97.9 °F (36.6 °C) (01/13/22 0837)  Pulse: (!) 113 (01/13/22 0837)  Resp: 18 (01/13/22 0837)  BP: (!) 80/0 (01/13/22 0330)  SpO2: 95 % (01/13/22 0837) Vital Signs (24h Range):  Temp:  [97.8 °F (36.6 °C)-101.4 °F (38.6 °C)] 97.9 °F (36.6 °C)  Pulse:  [] 113  Resp:  [16-18] 18  SpO2:  [93 %-98 %] 95 %  BP: (68-82)/(0) 80/0     Weight: 89.1 kg (196 lb 6.9 oz)  Body mass  index is 30.77 kg/m².    Estimated Creatinine Clearance: 96.4 mL/min (based on SCr of 0.9 mg/dL).    Physical Exam  Vitals and nursing note reviewed.   Constitutional:       General: He is not in acute distress.     Appearance: He is well-developed. He is obese. He is not ill-appearing, toxic-appearing or diaphoretic.   Cardiovascular:      Rate and Rhythm: Tachycardia present.      Comments: VAD hum  Pulmonary:      Effort: Pulmonary effort is normal. No respiratory distress.      Breath sounds: No wheezing or rales.   Abdominal:      General: There is no distension.      Palpations: Abdomen is soft.      Tenderness: There is no abdominal tenderness. There is no guarding.      Comments: Epigastric wound undressed - appears to be closed/healed.  No erythema/tenderness/fluctuance   See photos below     DLES site was not dressed at visit today; no drainage. No erythema. See photos below   Musculoskeletal:         General: No swelling or tenderness. Normal range of motion.      Cervical back: Normal range of motion.   Skin:     General: Skin is warm and dry.      Findings: No erythema or rash.   Neurological:      Mental Status: He is alert and oriented to person, place, and time.   Psychiatric:         Behavior: Behavior normal.       Epigastric wound      DLES            Significant Labs:   Blood Culture:   Recent Labs   Lab 01/04/22  0420 01/04/22  0425 01/07/22  0604 01/12/22  1715 01/12/22  1716   LABBLOO No growth after 5 days. No growth after 5 days. No growth after 5 days.  No growth after 5 days. No Growth to date No Growth to date     CBC:   Recent Labs   Lab 01/11/22  1031 01/12/22  0327 01/13/22  0547   WBC 16.01* 15.73* 17.51*   HGB 7.2* 7.0* 7.3*   HCT 24.8* 24.5* 25.6*   * 626* 547*     CMP:   Recent Labs   Lab 01/12/22  0327 01/13/22  0547   * 134*   K 3.9 4.6    103   CO2 22* 22*   GLU 99 130*   BUN 5* 6   CREATININE 0.8 0.9   CALCIUM 8.1* 8.3*   PROT 7.3  --    ALBUMIN 2.2*  --     BILITOT 0.6  --    ALKPHOS 72  --    AST 23  --    ALT 17  --    ANIONGAP 8 9   EGFRNONAA >60.0 >60.0     Wound Culture:   Recent Labs   Lab 07/23/21  1004 08/09/21  1119 08/09/21  1128 10/05/21  1732 01/03/22  1822   LABAERO METHICILLIN RESISTANT STAPHYLOCOCCUS AUREUS  Moderate  * No growth No growth METHICILLIN RESISTANT STAPHYLOCOCCUS AUREUS  Rare  * PSEUDOMONAS AERUGINOSA  Few  *       Significant Imaging: I have reviewed all pertinent imaging results/findings within the past 24 hours.   Transthoracic echo (TTE) complete  Order# [id number]  Reading physician: Roman Varghese MD Ordering physician: Mallika Lugo NP Study date: 1/11/22       Reason for Exam  Priority: Routine  Dx: Complication involving left ventricular assist device (LVAD) [T82.9XXA (ICD-10-CM)]     Result Image Hyperlink     Show images for Echo    Summary    · LVIDd 6.60 cm  · The left ventricle is moderately enlarged with eccentric hypertrophy and severely decreased systolic function.  · There is an LVAD present. Base speed is 5300 RPMs. The pump type is a Heartmate III. The interventricular septum appears midline. The aortic valve opens intermittently.  · Severe left atrial enlargement.  · The estimated ejection fraction is 25%.  · Left ventricular diastolic dysfunction.  · Tachycardia was present  · Normal right ventricular size.  · Right ventricle not well visualized due to poor acoustic window.  · Poor endocardial visualization  · Mild tricuspid regurgitation.  · Elevated central venous pressure (15 mmHg).  · The estimated PA systolic pressure is 46 mmHg.  · There has been interval degeneration of the mitral valve in comparison with prior study, suspect partial flail posterior leaflet, cannot exclude significant mitral regurgitation.          CT Chest Abdomen Pelvis Without Contrast (XPD) [[id number]] (Abnormal) Resulted: 01/09/22 1113   Order Status: Completed Updated: 01/09/22 1115   Narrative:     EXAMINATION:   CT CHEST ABDOMEN  PELVIS WITHOUT CONTRAST(XPD)     CLINICAL HISTORY:   LVAD pt, DLES infection;     TECHNIQUE:   Low dose axial images, sagittal and coronal reformations were obtained from the thoracic inlet to the pubic synthesis without contrast     COMPARISON:   CT 01/02/2022     FINDINGS:   Base of neck structures in thoracic soft tissues are within normal limits.     Postoperative changes of LVAD placement.  Unable to assess cannula patency on noncontrast exam.  No evidence of focal fluid collection or abscess.  Stable subcutaneous soft tissue induration about the drive line catheter course within the midline ventral abdominal wall (series 3, image 60).  No detrimental interval change.     Heart is stable in size.  No pericardial effusion.  Thoracic aorta is normal in caliber.     Stable mildly prominent mediastinal lymph nodes.  No new adenopathy.     Prominence of the bilateral hilar vasculature and peribronchial thickening.  Mild interlobular septal thickening within the bilateral upper lobes with patchy ground-glass density.  Findings are suggestive for mild pulmonary edema.  No focal alveolar consolidation.  No significant pleural effusion or pneumothorax.     Liver is normal in size and attenuation.  No focal hepatic lesion detected noting decreased sensitivity on noncontrast exam.  Gallbladder is contracted and contains a calcified stone.  No biliary ductal dilatation.     Spleen is upper limits of normal in size.     Adrenal glands and pancreas are unremarkable.     Stable lobular contour of the right kidney which may represent cortical scarring.  No renal stone.  No hydroureteronephrosis.  Urinary bladder is decompressed.     Small and large bowel are normal caliber.  No evidence of bowel obstruction or inflammation.  Colonic diverticulosis.     Increased soft tissue thickening about the distal superior mesenteric vascular with apparent vessel enlargement (series 3, image 92).  Engorged distal small vessel mesenteric  vasculature and surrounding edema/stranding.  Limited evaluation given lack of intravenous contrast.     Normal caliber abdominal aorta.     Fat containing umbilical hernia.     Status post intramedullary damaris fixation of the bilateral femurs.  Degenerative changes of the osseous structures.  Stable peripherally sclerotic lucent lesion within the left iliac bone.  Chronic right rib fractures.  No acute fracture detected.  Sternotomy wires noted.    Impression:       1. Increased soft tissue thickening about the distal superior mesenteric vasculature with apparent vessel enlargement.  Engorged distal small vessel mesenteric vasculature and surrounding edema/stranding.  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  Differential also includes mesenteric panniculitis however felt less likely.   2. Stable postoperative changes of LVAD placement.  Stable soft tissue induration about the drive line catheter within the ventral abdominal wall.  No focal fluid collection or abscess.   3. Mild bilateral interlobular septal thickening and ground-glass density suggestive for mild pulmonary edema.   4. Cholelithiasis.   5. Additional findings as above.   This report was flagged in Epic as abnormal.       Electronically signed by: Jimenez Gardner   Date: 01/09/2022   Time: 11:13   X-Ray Chest AP Portable [[id number]] Resulted: 01/05/22 1320   Order Status: Completed Updated: 01/05/22 1322   Narrative:     EXAMINATION:   XR CHEST AP PORTABLE     CLINICAL HISTORY:   cough;     TECHNIQUE:   Single frontal view of the chest was performed.     COMPARISON:   01/03/2022     FINDINGS:   Stable mild cardiomegaly.     Stable mild pulmonary venous congestion.     The previously depicted lines, tubes, and postsurgical changes appear similar.  The latter include a continuous flow left ventricular assistant device.    Impression:       No significant interval change since prior exam noted.       Electronically  signed by: Christine Dotson   Date: 01/05/2022   Time: 13:20       Imaging History    2022  Date Procedure Name Status Accession Number Location   01/09/22 10:36 AM CT Chest Abdomen Pelvis Without Contrast (XPD) Final [id number] [facility]   01/05/22 12:58 PM X-Ray Chest AP Portable Final [id number] [facility]   01/03/22 05:17 PM X-Ray Chest AP Portable Final [id number] [facility]   01/02/22 08:51 PM CT Chest Abdomen Pelvis Without Contrast (XPD) Final [id number] [facility]   01/02/22 06:54 PM X-Ray Chest AP Portable Final [id number] [facility]   01/01/22 12:06 AM X-Ray Chest 1 View Final [id number] [facility]   01/11/22 09:28 AM Echo Final [id number] [facility]   2021  Date Procedure Name Status Accession Number Location   12/31/21 12:00 AM CARDIAC MONITORING STRIPS Final

## 2022-01-13 NOTE — NURSING
"LVAD dressing was completely removed upon arrival to the room this AM. LVAD dressing change completed using sterile technique with LVAD kit. DLES is a "1" with minimal drainage noted on the drain sponge. Skin above DLS opened raw due to Pt scratching. RN educated Pt on importance of not scratching LVAD dressing due to his infection and his skin integrity. Pt continued to scratch at site but did not pick at dressing. RN returned to the bedside later around noon to find LVAD dressing completely removed again. Pt stated, " he itches too much and says to take our time replacing his LVAD dressing." RN contacted BALAJI Melton and Ammy LVAD coordinator on situation. BALAJI to put in ointment for skin tear on abdomen. RN instructed to try to do soap and water dressing change and to administer IV benadryl.    "

## 2022-01-13 NOTE — ASSESSMENT & PLAN NOTE
Procedure: Device Interrogation Including analysis of device parameters  Patient continuing to remove his driveline dressing due to severe itching. Will try calmoseptine topical and scheduled benadryl to reduce itching. Can also try soap and water dressing changes instead of chlorhexidine while inpatient.   Current Settings: Ventricular Assist Device  Review of device function is stable/unstable    TXP LVAD INTERROGATIONS 1/13/2022 1/13/2022 1/13/2022 1/12/2022 1/12/2022 1/12/2022 1/12/2022   Type HeartMate3 HeartMate3 HeartMate3 HeartMate3 HeartMate3 HeartMate3 HeartMate3   Flow 5.0 4.9 4.9 4.8 4.9 5.0 4.7   Speed 5300 5300 5350 5300 5300 5300 5300   PI 2.8 2.9 2.5 3.1 2.4 2.8 3.0   Power (Centeno) 3.8 3.8 3.9 3.8 3.8 3.8 3.8   LSL - 4900 4900 4900 4900 4900 4900   Pulsatility - - Intermittent pulse Intermittent pulse Intermittent pulse Intermittent pulse Intermittent pulse

## 2022-01-13 NOTE — PROGRESS NOTES
UPDATE    SW to Pt's room for ongoing support & needs assessment. Pt using bathroom, reports that he is feeling ok today and denies needs. Pt verbalizes that he will ask nursing team to call SW if he would like to talk later. SW providing ongoing psychosocial, counseling, & emotional support, education, resources, assistance, and discharge planning as indicated.  SW to continue to follow.

## 2022-01-13 NOTE — SUBJECTIVE & OBJECTIVE
Interval History:   No AEON.   T max 101.4  WBC trending up 17  Repeat BCXs 1/7 and 1/12 NGTD  No complaints today.  Reports he is anxious to go home    Review of Systems   Constitutional: Negative for chills, diaphoresis, fatigue and fever.   Respiratory: Negative for cough and shortness of breath.    Cardiovascular: Negative for chest pain and leg swelling.   Gastrointestinal: Negative for abdominal pain, diarrhea, nausea and vomiting.   Genitourinary: Negative for difficulty urinating, dysuria, flank pain and hematuria.   Musculoskeletal: Negative for arthralgias, back pain, joint swelling, myalgias and neck pain.   Skin: Positive for wound (open wound epigastric area now healed). Negative for rash.   Neurological: Negative for dizziness, weakness, numbness and headaches.   Psychiatric/Behavioral: Negative for agitation. The patient is not nervous/anxious.      Objective:     Vital Signs (Most Recent):  Temp: 97.9 °F (36.6 °C) (01/13/22 0837)  Pulse: (!) 113 (01/13/22 0837)  Resp: 18 (01/13/22 0837)  BP: (!) 80/0 (01/13/22 0330)  SpO2: 95 % (01/13/22 0837) Vital Signs (24h Range):  Temp:  [97.8 °F (36.6 °C)-101.4 °F (38.6 °C)] 97.9 °F (36.6 °C)  Pulse:  [] 113  Resp:  [16-18] 18  SpO2:  [93 %-98 %] 95 %  BP: (68-82)/(0) 80/0     Weight: 89.1 kg (196 lb 6.9 oz)  Body mass index is 30.77 kg/m².    Estimated Creatinine Clearance: 96.4 mL/min (based on SCr of 0.9 mg/dL).    Physical Exam  Vitals and nursing note reviewed.   Constitutional:       General: He is not in acute distress.     Appearance: He is well-developed. He is obese. He is not ill-appearing, toxic-appearing or diaphoretic.   Cardiovascular:      Rate and Rhythm: Tachycardia present.      Comments: VAD hum  Pulmonary:      Effort: Pulmonary effort is normal. No respiratory distress.      Breath sounds: No wheezing or rales.   Abdominal:      General: There is no distension.      Palpations: Abdomen is soft.      Tenderness: There is no abdominal  tenderness. There is no guarding.      Comments: Epigastric wound undressed - appears to be closed/healed.  No erythema/tenderness/fluctuance   See photos below     DLES site was not dressed at visit today; no drainage. No erythema. See photos below   Musculoskeletal:         General: No swelling or tenderness. Normal range of motion.      Cervical back: Normal range of motion.   Skin:     General: Skin is warm and dry.      Findings: No erythema or rash.   Neurological:      Mental Status: He is alert and oriented to person, place, and time.   Psychiatric:         Behavior: Behavior normal.       Epigastric wound      DLES            Significant Labs:   Blood Culture:   Recent Labs   Lab 01/04/22  0420 01/04/22  0425 01/07/22  0604 01/12/22  1715 01/12/22  1716   LABBLOO No growth after 5 days. No growth after 5 days. No growth after 5 days.  No growth after 5 days. No Growth to date No Growth to date     CBC:   Recent Labs   Lab 01/11/22  1031 01/12/22  0327 01/13/22  0547   WBC 16.01* 15.73* 17.51*   HGB 7.2* 7.0* 7.3*   HCT 24.8* 24.5* 25.6*   * 626* 547*     CMP:   Recent Labs   Lab 01/12/22  0327 01/13/22  0547   * 134*   K 3.9 4.6    103   CO2 22* 22*   GLU 99 130*   BUN 5* 6   CREATININE 0.8 0.9   CALCIUM 8.1* 8.3*   PROT 7.3  --    ALBUMIN 2.2*  --    BILITOT 0.6  --    ALKPHOS 72  --    AST 23  --    ALT 17  --    ANIONGAP 8 9   EGFRNONAA >60.0 >60.0     Wound Culture:   Recent Labs   Lab 07/23/21  1004 08/09/21  1119 08/09/21  1128 10/05/21  1732 01/03/22  1822   LABAERO METHICILLIN RESISTANT STAPHYLOCOCCUS AUREUS  Moderate  * No growth No growth METHICILLIN RESISTANT STAPHYLOCOCCUS AUREUS  Rare  * PSEUDOMONAS AERUGINOSA  Few  *       Significant Imaging: I have reviewed all pertinent imaging results/findings within the past 24 hours.   Transthoracic echo (TTE) complete  Order# [id number]  Reading physician: Roman Varghese MD Ordering physician: Mallika Lugo NP Study date:  1/11/22       Reason for Exam  Priority: Routine  Dx: Complication involving left ventricular assist device (LVAD) [T82.9XXA (ICD-10-CM)]     Result Image Hyperlink     Show images for Echo    Summary    · LVIDd 6.60 cm  · The left ventricle is moderately enlarged with eccentric hypertrophy and severely decreased systolic function.  · There is an LVAD present. Base speed is 5300 RPMs. The pump type is a Heartmate III. The interventricular septum appears midline. The aortic valve opens intermittently.  · Severe left atrial enlargement.  · The estimated ejection fraction is 25%.  · Left ventricular diastolic dysfunction.  · Tachycardia was present  · Normal right ventricular size.  · Right ventricle not well visualized due to poor acoustic window.  · Poor endocardial visualization  · Mild tricuspid regurgitation.  · Elevated central venous pressure (15 mmHg).  · The estimated PA systolic pressure is 46 mmHg.  · There has been interval degeneration of the mitral valve in comparison with prior study, suspect partial flail posterior leaflet, cannot exclude significant mitral regurgitation.          CT Chest Abdomen Pelvis Without Contrast (XPD) [[id number]] (Abnormal) Resulted: 01/09/22 1113   Order Status: Completed Updated: 01/09/22 1115   Narrative:     EXAMINATION:   CT CHEST ABDOMEN PELVIS WITHOUT CONTRAST(XPD)     CLINICAL HISTORY:   LVAD pt, DLES infection;     TECHNIQUE:   Low dose axial images, sagittal and coronal reformations were obtained from the thoracic inlet to the pubic synthesis without contrast     COMPARISON:   CT 01/02/2022     FINDINGS:   Base of neck structures in thoracic soft tissues are within normal limits.     Postoperative changes of LVAD placement.  Unable to assess cannula patency on noncontrast exam.  No evidence of focal fluid collection or abscess.  Stable subcutaneous soft tissue induration about the drive line catheter course within the midline ventral abdominal wall (series 3, image  60).  No detrimental interval change.     Heart is stable in size.  No pericardial effusion.  Thoracic aorta is normal in caliber.     Stable mildly prominent mediastinal lymph nodes.  No new adenopathy.     Prominence of the bilateral hilar vasculature and peribronchial thickening.  Mild interlobular septal thickening within the bilateral upper lobes with patchy ground-glass density.  Findings are suggestive for mild pulmonary edema.  No focal alveolar consolidation.  No significant pleural effusion or pneumothorax.     Liver is normal in size and attenuation.  No focal hepatic lesion detected noting decreased sensitivity on noncontrast exam.  Gallbladder is contracted and contains a calcified stone.  No biliary ductal dilatation.     Spleen is upper limits of normal in size.     Adrenal glands and pancreas are unremarkable.     Stable lobular contour of the right kidney which may represent cortical scarring.  No renal stone.  No hydroureteronephrosis.  Urinary bladder is decompressed.     Small and large bowel are normal caliber.  No evidence of bowel obstruction or inflammation.  Colonic diverticulosis.     Increased soft tissue thickening about the distal superior mesenteric vascular with apparent vessel enlargement (series 3, image 92).  Engorged distal small vessel mesenteric vasculature and surrounding edema/stranding.  Limited evaluation given lack of intravenous contrast.     Normal caliber abdominal aorta.     Fat containing umbilical hernia.     Status post intramedullary damaris fixation of the bilateral femurs.  Degenerative changes of the osseous structures.  Stable peripherally sclerotic lucent lesion within the left iliac bone.  Chronic right rib fractures.  No acute fracture detected.  Sternotomy wires noted.    Impression:       1. Increased soft tissue thickening about the distal superior mesenteric vasculature with apparent vessel enlargement.  Engorged distal small vessel mesenteric vasculature and  surrounding edema/stranding.  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  Differential also includes mesenteric panniculitis however felt less likely.   2. Stable postoperative changes of LVAD placement.  Stable soft tissue induration about the drive line catheter within the ventral abdominal wall.  No focal fluid collection or abscess.   3. Mild bilateral interlobular septal thickening and ground-glass density suggestive for mild pulmonary edema.   4. Cholelithiasis.   5. Additional findings as above.   This report was flagged in Epic as abnormal.       Electronically signed by: Jimenez Gardner   Date: 01/09/2022   Time: 11:13   X-Ray Chest AP Portable [[id number]] Resulted: 01/05/22 1320   Order Status: Completed Updated: 01/05/22 1322   Narrative:     EXAMINATION:   XR CHEST AP PORTABLE     CLINICAL HISTORY:   cough;     TECHNIQUE:   Single frontal view of the chest was performed.     COMPARISON:   01/03/2022     FINDINGS:   Stable mild cardiomegaly.     Stable mild pulmonary venous congestion.     The previously depicted lines, tubes, and postsurgical changes appear similar.  The latter include a continuous flow left ventricular assistant device.    Impression:       No significant interval change since prior exam noted.       Electronically signed by: Christine Dotson   Date: 01/05/2022   Time: 13:20       Imaging History    2022  Date Procedure Name Status Accession Number Location   01/09/22 10:36 AM CT Chest Abdomen Pelvis Without Contrast (XPD) Final [id number] [facility]   01/05/22 12:58 PM X-Ray Chest AP Portable Final [id number] [facility]   01/03/22 05:17 PM X-Ray Chest AP Portable Final [id number] [facility]   01/02/22 08:51 PM CT Chest Abdomen Pelvis Without Contrast (XPD) Final [id number] [facility]   01/02/22 06:54 PM X-Ray Chest AP Portable Final [id number] [facility]   01/01/22 12:06 AM X-Ray Chest 1 View Final [id number] [facility]   01/11/22 09:28 AM Echo Final [id number] [facility]   2021  Date Procedure Name  Status Accession Number Location   12/31/21 12:00 AM CARDIAC MONITORING STRIPS Final

## 2022-01-13 NOTE — ASSESSMENT & PLAN NOTE
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 On admit presented with cough, nausea, vomiting, weakness, and temp of 103 F  - Admits to noncompliance with suppressive Doxy PTA  - Blood cultures 1/1 & 1/2 positive for Staph, repeats -ve and blood cx 1/7 with NGTD  - Abdominal culture 1/3 positive for pseudomonas  - Spiked fever again last night Tmax 101, WCC still elevated at 17k  - ID following, vanc changed to dapto on 1/1 given hx of VISA/VRSA/MRSA. Cefepime changed to jacoby 1/2 and then jacoby changed to Zosyn 1/6 for Pseudomonas from epigastric wound cx done 1/3. Zosyn changed to Cefepime 1/10 by ID. Switching back to Zosyn today. (Current antibiotics = daptomycin + Zosyn).   - ID testing fungitell assay today.   - CT c/a/p on admit with left basilar ground-glass and airspace opacities with additional slight ground-glass attenuation in the left upper lobe.  Abdomen distended and tender on 1/9 - CT c/a/p repeated 1/9 and shows increased soft tissue thickening about the distal superior mesenteric vasculature with apparent vessel enlargement.  Engorged distal small vessel mesenteric vasculature and surrounding edema/stranding.  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  Differential also includes mesenteric panniculitis however felt less likely. Stable postoperative changes of LVAD placement.  Stable soft tissue induration about the drive line catheter within the ventral abdominal wall.  No focal fluid collection or abscess. Per Int Cards, no intervention can be done  - Long h/o PICC line issues. Will consult IR for tunneled PICC line for home IV antibiotics if patient does not elect to go home with Hospice. Will wait until tomorrow for tunnelled cath to make sure latest BCx's (1/12) are clear x 48 hr.    Pt to ED with complaints of pain in mouth especially at gumline that started two days ago. Pt reports being seen by Dentist yesterday and was prescribed prescription mouth wash and PO steroids and ABT that he has been taking as prescribed with no relief.

## 2022-01-13 NOTE — PT/OT/SLP PROGRESS
Occupational Therapy      Patient Name:  Saba Lott   MRN:  [id number]    Patient not seen today secondary to pt c drainage from LVAD site. Will follow-up tomorrow.    1/13/2022

## 2022-01-13 NOTE — PROGRESS NOTES
[facility]  Palliative Medicine  Progress Note    Patient Name: Saba Lott  MRN: [id number]  Admission Date: 12/31/2021  Hospital Length of Stay: 13 days  Code Status: Full Code   Attending Provider: Santosh Avalos MD  Consulting Provider: Kelsey Padilla NP  Primary Care Physician: Mary Lombardi MD  Principal Problem:Fever    Patient information was obtained from patient, parent, past medical records and primary team.      Assessment/Plan:     Palliative Encounter:  -Remains full code/full treatment  -Pt's goals:  To prolong life  -Discussed current condition, prognosis, options for care, including current care vs comfort care and the risks and benefits of each. Hospice option discussed in detail with pt and family per their request.  Hospice does not align with GOC at this time  -Pt may benefit from home-based palliative care for symptom management and ongoing GOC discussion.     I will follow-up with patient. Please contact us if you have any additional questions.     Palliative Encounter:  Follow up palliative visit to continue goals of care discussion. Pt included his mother, Anna, in discussion by phone.  We reviewed previous GOC discussion and the options for care, including continuation of current treatment (which may mean frequent hospitalizations) vs transition to comfort care/hospice.  Pt expresses that he wants live as long as he can, even if quality of life is decreased. At this time, his goals do not align with hospice.  Pt remains full code/full treatment. This is consistent with goals he has expressed in palliative discussions over the last year.  He could benefit from home-based palliative care for symptom management and ongoing GOC discussions.       Medications:  Continuous Infusions:  Scheduled Meds:   atorvastatin  20 mg Oral QHS    ceFEPime (MAXIPIME) IVPB EXTENDED INFUSION  2 g Intravenous Q8H    DAPTOmycin (CUBICIN)  IV  10 mg/kg Intravenous Q24H     docusate sodium  100 mg Oral Daily    Lactobacillus rhamnosus GG  1 capsule Oral Daily    lisinopriL  5 mg Oral Daily    magnesium oxide  800 mg Oral TID     PRN Meds:acetaminophen, albuterol-ipratropium, aluminum & magnesium hydroxide-simethicone, benzonatate, diphenhydrAMINE, guaiFENesin 100 mg/5 ml, ondansetron    Objective:     Vital Signs (Most Recent):  Temp: 100 °F (37.8 °C) (01/12/22 1453)  Pulse: (!) 116 (01/12/22 1600)  Resp: 18 (01/12/22 1453)  BP: (!) 78/0 (01/12/22 1453)  SpO2: (!) 93 % (01/12/22 1453) Vital Signs (24h Range):  Temp:  [97.5 °F (36.4 °C)-101.4 °F (38.6 °C)] 100 °F (37.8 °C)  Pulse:  [] 116  Resp:  [16-20] 18  SpO2:  [93 %-100 %] 93 %  BP: (70-88)/(0) 78/0     Weight: 89.1 kg (196 lb 6.9 oz)  Body mass index is 30.77 kg/m².    Physical Exam  Vitals and nursing note reviewed.   Constitutional:       General: He is not in acute distress.  HENT:      Head: Normocephalic and atraumatic.      Right Ear: External ear normal.      Left Ear: External ear normal.      Nose: Nose normal. No congestion or rhinorrhea.   Eyes:      General:         Right eye: No discharge.         Left eye: No discharge.   Cardiovascular:      Rate and Rhythm: Normal rate.   Pulmonary:      Effort: Pulmonary effort is normal. No respiratory distress.   Abdominal:      Palpations: Abdomen is soft.   Musculoskeletal:      Cervical back: Normal range of motion.   Skin:     General: Skin is warm and dry.      Findings: No rash.   Neurological:      Mental Status: He is alert and oriented to person, place, and time.         Palliative Exam    Advance Care Planning   Advance Care Planning       Significant Labs: All pertinent labs within the past 24 hours have been reviewed.  CBC:   Recent Labs   Lab 01/12/22 0327   WBC 15.73*   HGB 7.0*   HCT 24.5*   MCV 60*   *     BMP:  Recent Labs   Lab 01/12/22 0327   GLU 99   *   K 3.9      CO2 22*   BUN 5*   CREATININE 0.8   CALCIUM 8.1*   MG 1.4*      LFT:  Lab Results   Component Value Date    AST 23 01/12/2022    ALKPHOS 72 01/12/2022    BILITOT 0.6 01/12/2022     Albumin:   Albumin   Date Value Ref Range Status   01/12/2022 2.2 (L) 3.5 - 5.2 g/dL Final   05/04/2020 4.0  Final     Protein:   Total Protein   Date Value Ref Range Status   01/12/2022 7.3 6.0 - 8.4 g/dL Final     Lactic acid:   Lab Results   Component Value Date    LACTATE 1.6 01/01/2022    LACTATE 2.1 10/05/2021       Significant Imaging: I have reviewed all pertinent imaging results/findings within the past 24 hours.      More than 50% of 35 min spent in chart review, care coordination, face-to-face encounter, and advance care planning.     Kelsey Padilla NP  Palliative Medicine  [facility]

## 2022-01-14 NOTE — PROGRESS NOTES
[facility] - Cardiology Stepdown  Heart Transplant  Progress Note    Patient Name: Saba oLtt  MRN: [id number]  Admission Date: 12/31/2021  Hospital Length of Stay: 14 days  Attending Physician: Santosh Avalos MD  Primary Care Provider: Mary Lombardi MD  Principal Problem:Fever    Subjective:     **Interval History: Less itchy today (left his Driveline dressing on this AM). Most recent Blood cultures (1/12) NGTD x 2 days. Afebrile w/ Tmax 99.4. Palliative medicine following. Patient is saying he wants home hospice but upon further questioning he states he wants home hospice just so he can go home. His goals of care do not line up with hospice at this time will likely need to establish care with palliative care in the outpatient/home setting to help reduce hospitalization.     Continuous Infusions:  Scheduled Meds:   atorvastatin  20 mg Oral QHS    DAPTOmycin (CUBICIN)  IV  10 mg/kg Intravenous Q24H    docusate sodium  100 mg Oral Daily    Lactobacillus rhamnosus GG  1 capsule Oral Daily    lisinopriL  5 mg Oral Daily    magnesium oxide  800 mg Oral TID    piperacillin-tazobactam (ZOSYN) IVPB  4.5 g Intravenous Q8H    triamcinolone acetonide 0.025%   Topical (Top) BID    warfarin  4 mg Oral Daily     PRN Meds:acetaminophen, albuterol-ipratropium, aluminum & magnesium hydroxide-simethicone, benzonatate, diphenhydrAMINE, guaiFENesin 100 mg/5 ml, ondansetron    Review of patient's allergies indicates:   Allergen Reactions    Iodine and iodide containing products      Objective:     Vital Signs (Most Recent):  Temp: 100.2 °F (37.9 °C) (01/14/22 1022)  Pulse: (!) 113 (01/14/22 0659)  Resp: 18 (01/14/22 1022)  BP: (!) 72/0 (01/14/22 1022)  SpO2: 98 % (01/14/22 1022) Vital Signs (24h Range):  Temp:  [97.6 °F (36.4 °C)-100.2 °F (37.9 °C)] 100.2 °F (37.9 °C)  Pulse:  [104-114] 113  Resp:  [18] 18  SpO2:  [94 %-98 %] 98 %  BP: (62-78)/(0) 72/0     Patient Vitals for the past 72 hrs (Last 3 readings):    Weight   01/12/22 0900 89.1 kg (196 lb 6.9 oz)     Body mass index is 30.77 kg/m².      Intake/Output Summary (Last 24 hours) at 1/14/2022 1139  Last data filed at 1/14/2022 0400  Gross per 24 hour   Intake 120 ml   Output 400 ml   Net -280 ml       Hemodynamic Parameters:     Telemetry: ST with PVC's    Physical Exam  Constitutional:       Appearance: Normal appearance.   HENT:      Head: Normocephalic and atraumatic.   Eyes:      Conjunctiva/sclera: Conjunctivae normal.   Neck:      Comments: Neck veins are not elevated  Cardiovascular:      Rate and Rhythm: Regular rhythm. Tachycardia present.      Comments: Smooth VAD hum  Pulmonary:      Effort: Pulmonary effort is normal.      Breath sounds: Normal breath sounds.   Abdominal:      General: Bowel sounds are normal. There is distension.      Comments: LVAD dressing c/d/i.    Musculoskeletal:         General: No swelling. Normal range of motion.      Cervical back: Normal range of motion and neck supple.   Skin:     General: Skin is warm and dry.      Capillary Refill: Capillary refill takes 2 to 3 seconds.   Neurological:      General: No focal deficit present.      Mental Status: He is alert and oriented to person, place, and time.   Psychiatric:         Mood and Affect: Mood normal.         Behavior: Behavior normal.         Thought Content: Thought content normal.         Judgment: Judgment normal.         Significant Labs:  CBC:  Recent Labs   Lab 01/12/22 0327 01/13/22 0547 01/14/22 0448   WBC 15.73* 17.51* 14.55*   RBC 4.09* 4.08* 3.97*   HGB 7.0* 7.3* 7.1*   HCT 24.5* 25.6* 24.5*   * 547* 560*   MCV 60* 63* 62*   MCH 17.1* 17.9* 17.9*   MCHC 28.6* 28.5* 29.0*     BNP:  Recent Labs   Lab 01/10/22  0539 01/12/22 0327 01/14/22 0448   * 637* 488*     CMP:  Recent Labs   Lab 01/10/22  0538 01/11/22  0531 01/12/22  0327 01/13/22 0547 01/14/22 0448      < > 99 130* 90   CALCIUM 8.5*   < > 8.1* 8.3* 8.0*   ALBUMIN 2.1*  --  2.2*  --   1.9*   PROT 7.1  --  7.3  --  6.8   *   < > 133* 134* 131*   K 4.1   < > 3.9 4.6 4.0   CO2 24   < > 22* 22* 22*      < > 103 103 100   BUN 6   < > 5* 6 6   CREATININE 1.0   < > 0.8 0.9 0.8   ALKPHOS 70  --  72  --  73   ALT 19  --  17  --  13   AST 33  --  23  --  19   BILITOT 0.6  --  0.6  --  0.6    < > = values in this interval not displayed.      Coagulation:   Recent Labs   Lab 01/12/22 0327 01/13/22  0547 01/14/22  0448   INR 1.9* 1.5* 1.4*   APTT 37.5* 32.4* 32.4*     LDH:  Recent Labs   Lab 01/12/22 0327 01/13/22  0547 01/14/22  0448   * 314* 285*     Microbiology:  Microbiology Results (last 7 days)     Procedure Component Value Units Date/Time    Blood culture [[id number]] Collected: 01/12/22 1716    Order Status: Completed Specimen: Blood Updated: 01/13/22 2012     Blood Culture, Routine No Growth to date      No Growth to date    Narrative:      From 2 different sites 30 minutes apart  Collection has been rescheduled by PAW at 01/12/2022 16:43 Reason:   labs due for 1642  Collection has been rescheduled by PAW at 01/12/2022 16:43 Reason:   labs due for 1642    Blood culture [[id number]] Collected: 01/12/22 1715    Order Status: Completed Specimen: Blood Updated: 01/13/22 2012     Blood Culture, Routine No Growth to date      No Growth to date    Narrative:      From 2 different sites 30 minutes apart  Collection has been rescheduled by PAW at 01/12/2022 16:43 Reason:   labs due for 1642  Collection has been rescheduled by PAW at 01/12/2022 16:43 Reason:   labs due for 1642    Clostridium difficile EIA [[id number]]     Order Status: No result Specimen: Stool     Clostridium difficile EIA [[id number]]     Order Status: Canceled Specimen: Stool     Blood culture [[id number]] Collected: 01/07/22 0604    Order Status: Completed Specimen: Blood Updated: 01/12/22 1012     Blood Culture, Routine No growth after 5 days.    Narrative:      From 2 different sites 30 minutes apart    Blood culture  [[id number]] Collected: 01/07/22 0604    Order Status: Completed Specimen: Blood Updated: 01/12/22 1012     Blood Culture, Routine No growth after 5 days.    Narrative:      From 2 different sites 30 minutes apart    Culture, Anaerobe [[id number]] Collected: 01/03/22 1822    Order Status: Completed Specimen: Wound from Abdomen Updated: 01/10/22 0850     Anaerobic Culture No anaerobes isolated    Blood culture [[id number]] Collected: 01/04/22 0425    Order Status: Completed Specimen: Blood Updated: 01/09/22 1012     Blood Culture, Routine No growth after 5 days.    Blood culture [[id number]] Collected: 01/04/22 0420    Order Status: Completed Specimen: Blood Updated: 01/09/22 1012     Blood Culture, Routine No growth after 5 days.    Blood culture [[id number]] Collected: 01/03/22 1723    Order Status: Completed Specimen: Blood Updated: 01/08/22 2012     Blood Culture, Routine No growth after 5 days.    Blood culture [[id number]] Collected: 01/03/22 1723    Order Status: Completed Specimen: Blood Updated: 01/08/22 2012     Blood Culture, Routine No growth after 5 days.    Blood culture [[id number]]  (Abnormal) Collected: 01/02/22 1946    Order Status: Completed Specimen: Blood Updated: 01/08/22 1226     Blood Culture, Routine Gram stain aer bottle: Gram positive cocci in clusters resembling Staph      Positive results previously called      METHICILLIN RESISTANT STAPHYLOCOCCUS AUREUS  ID consult required at [facility],[place] and [facility] locations.  For susceptibility see order #[id number]      Blood culture [[id number]]  (Abnormal) Collected: 01/02/22 1946    Order Status: Completed Specimen: Blood Updated: 01/08/22 1013     Blood Culture, Routine Gram stain uzma bottle: Gram positive cocci in clusters resembling Staph      Results called to and read back by:Khai Durand RN 01/04/2022  22:54      Gram stain aer bottle: Gram positive cocci       Positive results previously called  01/05/2022     Comment: Previous comment was  modified by NEG NEG at 11:38 on 01/05/2022 Gram   stain uzma bottle: Gram positive cocci in clusters resembling   StaphResults called to and read back by:Khai Durand RN 01/04/2022 22:54           METHICILLIN RESISTANT STAPHYLOCOCCUS AUREUS  ID consult required at [facility],[place] and [facility] locations.  For susceptibility see order #[id number]            I have reviewed all pertinent labs within the past 24 hours.    Estimated Creatinine Clearance: 108.5 mL/min (based on SCr of 0.8 mg/dL).    Diagnostic Results:  I have reviewed all pertinent imaging results/findings within the past 24 hours.    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 Patient is short of breath and uncomfortable at the time of giving history. He reports chills, weakness, cough, sputum production, weight gain, nausea, vomiting and trouble breathing. He went to his PCP office yesterday where his temp was 103 and was sent to the hospital afterwards. His white count is 20 on arrival. He is unable to lay flat. His HR is 102-142 sinus. He needs to be diuresed upon arrival but K is 2.9, ordered IV and PO Kcl, he vomited out oral potassium, IV replacement will be done.       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 On admit presented with cough, nausea, vomiting, weakness, and temp of 103 F  - Admits to noncompliance with suppressive Doxy PTA  - Blood cultures 1/1 & 1/2 positive for Staph, repeats -ve and blood cx 1/7 with NGTD  - Abdominal culture 1/3 positive for pseudomonas  - Spiked fever again last night Tmax 101, WCC still elevated at 17k  - ID  following, vanc changed to dapto on 1/1 given hx of VISA/VRSA/MRSA. Cefepime changed to jacoby 1/2 and then jacoby changed to Zosyn 1/6 for Pseudomonas from epigastric wound cx done 1/3. Zosyn changed to Cefepime 1/10 by ID. Switching back to Zosyn today. (Current antibiotics = daptomycin + Zosyn).   - ID testing fungitell assay today.   - CT c/a/p on admit with left basilar ground-glass and airspace opacities with additional slight ground-glass attenuation in the left upper lobe.  Abdomen distended and tender on 1/9 - CT c/a/p repeated 1/9 and shows increased soft tissue thickening about the distal superior mesenteric vasculature with apparent vessel enlargement.  Engorged distal small vessel mesenteric vasculature and surrounding edema/stranding.  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  Differential also includes mesenteric panniculitis however felt less likely. Stable postoperative changes of LVAD placement.  Stable soft tissue induration about the drive line catheter within the ventral abdominal wall.  No focal fluid collection or abscess. Per Int Cards, no intervention can be done  - Long h/o PICC line issues. Will consult IR for tunneled PICC line for home IV antibiotics if patient does not elect to go home with Hospice. Blood cultures now clear x 48 hours so could get tunneled PICC today. Patient stated this AM he agrees to home hospice. Will hold off on placing line for now.     Sepsis  -See fever    Complication involving left ventricular assist device (LVAD)  Procedure: Device Interrogation Including analysis of device parameters  Patient had DLES dressing intact this AM. Reportedly less itchy (getting IV benadryl and dressing changes with soap/water instead of chlorhexidine.)   Current Settings: Ventricular Assist Device  Review of device function is stable/unstable    TXP LVAD INTERROGATIONS 1/14/2022 1/14/2022 1/13/2022 1/13/2022 1/13/2022 1/13/2022 1/13/2022   Type  HeartMate3 HeartMate3 HeartMate3 HeartMate3 HeartMate3 HeartMate3 HeartMate3   Flow 4.9 5.0 5.0 4.9 5.0 5.0 4.9   Speed 5300 5300 5300 5300 5300 5300 5300   PI 2.8 3.7 3.8 3.9 2.6 2.8 2.9   Power (Centeno) 3.8 3.8 3.7 3.8 3.9 3.8 3.8   LSL 4900 4900 4900 4900 4900 - 4900   Pulsatility - Intermittent pulse Intermittent pulse Intermittent pulse - - -       MRSA bacteremia  -See fever    LVAD (left ventricular assist device) present  -S/P DT HM3 with MVR 2/2/20  -Current speed 5300  -INR goal 1.5-2.0 (hx of ICH). INR this AM 1.4 (got Vit K 1 mg IV on 1/6 for INR 3.0)  -LDH stable  -ECHO 1/11 at 5300 rpm: LVEDD 6.60, RV not well visualized, interval degeneration of MV, suspect partial flail posterior leaflet, cannot exclude significant MR, mild TR, IVC 15, BRYAN intermittently and septum midline  -Gave Lasix 40 mg IVP yesterday and resumed Lisinopril 5 mg qd given worsening MR. I&O's are not accurate today. As he is eating and drinking very little, with diurese only prn      Procedure: Device Interrogation Including analysis of device parameters  Current Settings: Ventricular Assist Device  Review of device function is stable      TXP LVAD INTERROGATIONS 1/14/2022 1/14/2022 1/13/2022 1/13/2022 1/13/2022 1/13/2022 1/13/2022   Type HeartMate3 HeartMate3 HeartMate3 HeartMate3 HeartMate3 HeartMate3 HeartMate3   Flow 4.9 5.0 5.0 4.9 5.0 5.0 4.9   Speed 5300 5300 5300 5300 5300 5300 5300   PI 2.8 3.7 3.8 3.9 2.6 2.8 2.9   Power (Centeno) 3.8 3.8 3.7 3.8 3.9 3.8 3.8   LSL 4900 4900 4900 4900 4900 - 4900   Pulsatility - Intermittent pulse Intermittent pulse Intermittent pulse - - -           Olivier Melton PA-C  Heart Transplant  [facility] - Cardiology Stepdown

## 2022-01-14 NOTE — ASSESSMENT & PLAN NOTE
56-year-old male with history of DCM s/p LVAD 2/6/2020, left occipital IPH, lead endocarditis s/p removal, history of VISA and polymicrobial  bacteremia in October (pseudomonas, e coli, proteus.  Strongy negative) on chronic suppressive doxy, presents from clinic with fevers.    Blood cultures of 1/1 - 2/4 bottles + MRSA  Blood cultures 1/2 - 3/4 MRSA  Blood cultures  1/3, 1/4, 1/7, 1/12 - NGTD   Abdominal wound cx - + Pseudomonas - pan sensitive    CT A/P 1/2/22 - unchanged small region of soft tissue induration and slight skin thickening about the drive line in anterior abdominal wall. No definite focal fluid collection about the LVAD. No definite new regions of soft tissue induration or focal fluid collections are identified about the remaining subcutaneous course of the LVAD driveline  Noted patchy LLL GGO and airspace opacities with additional patchy ground glass attenuation in DEVORAH  Slight non-specific perinephric fat stranding - U/A wnl    CT A/P 1/9 -  Persistent soft tissue induration about the drive line within the ventral abdominal wall.  No focal fluid collection or abscess.  Increased soft tissue thickening about the distal superior mesenteric vasculature with apparent vessel enlargement.  Engorged distal small vessel mesenteric vasculature and surrounding edema/stranding concerning for possible SMV thrombosis (limited assessment on noncontrast exam). Differential also includes mesenteric panniculitis however felt less likely.      Arellano sensitive pseudomonas on ABD wound cx - though wound now appears healed.  No drainage or signs of infection at DLES.     2D echo done: poor endocardial visualization but MV now shows significant destruction vs prior 2D in 10/2021 and possible flail leaflet.  MACIEL deferred by HTS given frailty and would not change antibiotic plan.      Given recurrent fevers, broadened cefepime to zosyn yesterday (added anaerobic coverage, continue to cover pseudomonas) and continued  daptomycin.   Reportedly has been having diarrhea, but nurse has been unable to evaluate and collect stool until today and this is pending.        T max 99.4. WBC 17>>14.5 today.   CRP downtrending.  HDS.  C dif pending,   Most recent repeat blood cultures NGTD.  Concern for possible endovascular infection if there is thrombus in SMV.  ? Source of fevers, though blood cultures have cleared.      Palliative Care following with patient.  Patient continues to affirm his desire to be full code/continue full treatment.  Hospice does not align with goals of care currently.  Patient verbalizing he feels better today, though is anxious to go home.  Pending tunneled PICC placement next week.     Plan:  1.   Continue IV daptomycin 10 mg/kg q 24 hours for MRSA bacteremia. Confirmed susceptibility with Micro lab.  KALI <.5.    2.   Continue zosyn 4.5g EI q 8 hours.  At discharge, for greater ease of administration, transition to zosyn continuous infusion 13.5 g IV q 24 hours.  If continuous infusion, will need to ensure two infusion ports in tunneled PICC.  May need to discuss administration of both with pharmacist.    3.   Anticipate 6 weeks of IV daptomycin and zosyn from date of first negative blood culture - Estimated end date 2/14/22  4.   Resume chronic suppressive doxycycline after completion of IV abx  5.   Weekly cbc, cmp, crp and CK and fax results to ID, quoc Rowan NP, at fax [phone]. (Spectra [id number], office [phone])  6.   ID follow up by virtual visit 14 days and with next scheduled VAD appointment.    7.   Will sign off.  Please call with questions or re-consult as needed   Re-consult if C dif positive.   For any questions or concerns over the weekend, please call CONG Arciniega at [phone] during daytime hours or page ID staff on call.       Data reviewed and plan discussed with ID staff, Dr. Guerrier  Discussed above plan with Primary Team, CONG Lehman

## 2022-01-14 NOTE — ASSESSMENT & PLAN NOTE
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 On admit presented with cough, nausea, vomiting, weakness, and temp of 103 F  - Admits to noncompliance with suppressive Doxy PTA  - Blood cultures 1/1 & 1/2 positive for Staph, repeats -ve and blood cx 1/7 with NGTD  - Abdominal culture 1/3 positive for pseudomonas  - Spiked fever again last night Tmax 101, WCC still elevated at 17k  - ID following, vanc changed to dapto on 1/1 given hx of VISA/VRSA/MRSA. Cefepime changed to jacoby 1/2 and then jacoby changed to Zosyn 1/6 for Pseudomonas from epigastric wound cx done 1/3. Zosyn changed to Cefepime 1/10 by ID. Switching back to Zosyn today. (Current antibiotics = daptomycin + Zosyn).   - ID testing fungitell assay today.   - CT c/a/p on admit with left basilar ground-glass and airspace opacities with additional slight ground-glass attenuation in the left upper lobe.  Abdomen distended and tender on 1/9 - CT c/a/p repeated 1/9 and shows increased soft tissue thickening about the distal superior mesenteric vasculature with apparent vessel enlargement.  Engorged distal small vessel mesenteric vasculature and surrounding edema/stranding.  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  Differential also includes mesenteric panniculitis however felt less likely. Stable postoperative changes of LVAD placement.  Stable soft tissue induration about the drive line catheter within the ventral abdominal wall.  No focal fluid collection or abscess. Per Int Cards, no intervention can be done  - Long h/o PICC line issues. Will consult IR for tunneled PICC line for home IV antibiotics if patient does not elect to go home with Hospice. Blood cultures now clear x 48 hours so could get tunneled PICC today. Patient stated this AM he agrees to home hospice. Will hold off on placing line for  now.

## 2022-01-14 NOTE — SUBJECTIVE & OBJECTIVE
Interval History:   No AEON.   T max 99.4  WBC downtrending - 14.5  Repeat BCXs 1/7 and 1/12 NGTD  Itching with DLES dressing changes, but kept dressing on today.   Palliative Care continues to follow, but at this time, hospice not consistent with patient goals of care      Review of Systems   Constitutional: Negative for chills, diaphoresis, fatigue and fever.   Respiratory: Negative for cough and shortness of breath.    Cardiovascular: Negative for chest pain and leg swelling.   Gastrointestinal: Positive for diarrhea. Negative for abdominal pain, nausea and vomiting.   Genitourinary: Negative for difficulty urinating, dysuria, flank pain and hematuria.   Musculoskeletal: Negative for arthralgias, back pain, joint swelling, myalgias and neck pain.   Skin: Positive for wound (open wound epigastric area now healed). Negative for rash.   Neurological: Negative for dizziness, weakness, numbness and headaches.   Psychiatric/Behavioral: Negative for agitation. The patient is not nervous/anxious.      Objective:     Vital Signs (Most Recent):  Temp: 98.2 °F (36.8 °C) (01/14/22 0400)  Pulse: (!) 113 (01/14/22 0659)  Resp: 18 (01/14/22 0400)  BP: (!) 78/0 (01/14/22 0400)  SpO2: 95 % (01/14/22 0400) Vital Signs (24h Range):  Temp:  [97.6 °F (36.4 °C)-99.4 °F (37.4 °C)] 98.2 °F (36.8 °C)  Pulse:  [104-114] 113  Resp:  [18] 18  SpO2:  [94 %-96 %] 95 %  BP: (62-78)/(0) 78/0     Weight: 89.1 kg (196 lb 6.9 oz)  Body mass index is 30.77 kg/m².    Estimated Creatinine Clearance: 108.5 mL/min (based on SCr of 0.8 mg/dL).    Physical Exam  Vitals and nursing note reviewed.   Constitutional:       General: He is not in acute distress.     Appearance: He is well-developed. He is obese. He is not ill-appearing, toxic-appearing or diaphoretic.   Cardiovascular:      Rate and Rhythm: Tachycardia present.      Comments: VAD hum  Pulmonary:      Effort: Pulmonary effort is normal. No respiratory distress.      Breath sounds: No wheezing  or rales.   Abdominal:      General: There is no distension.      Palpations: Abdomen is soft.      Tenderness: There is no abdominal tenderness. There is no guarding.      Comments: VAD dressing c/d/i  Epigastric wound closed   Musculoskeletal:         General: No swelling or tenderness. Normal range of motion.      Cervical back: Normal range of motion.   Skin:     General: Skin is warm and dry.      Findings: No erythema or rash.   Neurological:      Mental Status: He is alert and oriented to person, place, and time.   Psychiatric:         Behavior: Behavior normal.       Epigastric wound 1/14      DLES 1/13            Significant Labs:   Blood Culture:   Recent Labs   Lab 01/04/22  0420 01/04/22  0425 01/07/22  0604 01/12/22  1715 01/12/22  1716   LABBLOO No growth after 5 days. No growth after 5 days. No growth after 5 days.  No growth after 5 days. No Growth to date  No Growth to date No Growth to date  No Growth to date     CBC:   Recent Labs   Lab 01/13/22  0547 01/14/22  0448   WBC 17.51* 14.55*   HGB 7.3* 7.1*   HCT 25.6* 24.5*   * 560*     CMP:   Recent Labs   Lab 01/13/22  0547 01/14/22  0448   * 131*   K 4.6 4.0    100   CO2 22* 22*   * 90   BUN 6 6   CREATININE 0.9 0.8   CALCIUM 8.3* 8.0*   PROT  --  6.8   ALBUMIN  --  1.9*   BILITOT  --  0.6   ALKPHOS  --  73   AST  --  19   ALT  --  13   ANIONGAP 9 9   EGFRNONAA >60.0 >60.0     Wound Culture:   Recent Labs   Lab 07/23/21  1004 08/09/21  1119 08/09/21  1128 10/05/21  1732 01/03/22  1822   LABAERO METHICILLIN RESISTANT STAPHYLOCOCCUS AUREUS  Moderate  * No growth No growth METHICILLIN RESISTANT STAPHYLOCOCCUS AUREUS  Rare  * PSEUDOMONAS AERUGINOSA  Few  *       Significant Imaging: I have reviewed all pertinent imaging results/findings within the past 24 hours.   Transthoracic echo (TTE) complete  Order# [id number]  Reading physician: Roman Varghese MD Ordering physician: Mallika Lugo NP Study date: 1/11/22        Reason for Exam  Priority: Routine  Dx: Complication involving left ventricular assist device (LVAD) [T82.9XXA (ICD-10-CM)]     Result Image Hyperlink     Show images for Echo    Summary    · LVIDd 6.60 cm  · The left ventricle is moderately enlarged with eccentric hypertrophy and severely decreased systolic function.  · There is an LVAD present. Base speed is 5300 RPMs. The pump type is a Heartmate III. The interventricular septum appears midline. The aortic valve opens intermittently.  · Severe left atrial enlargement.  · The estimated ejection fraction is 25%.  · Left ventricular diastolic dysfunction.  · Tachycardia was present  · Normal right ventricular size.  · Right ventricle not well visualized due to poor acoustic window.  · Poor endocardial visualization  · Mild tricuspid regurgitation.  · Elevated central venous pressure (15 mmHg).  · The estimated PA systolic pressure is 46 mmHg.  · There has been interval degeneration of the mitral valve in comparison with prior study, suspect partial flail posterior leaflet, cannot exclude significant mitral regurgitation.          CT Chest Abdomen Pelvis Without Contrast (XPD) [[id number]] (Abnormal) Resulted: 01/09/22 1113   Order Status: Completed Updated: 01/09/22 1115   Narrative:     EXAMINATION:   CT CHEST ABDOMEN PELVIS WITHOUT CONTRAST(XPD)     CLINICAL HISTORY:   LVAD pt, DLES infection;     TECHNIQUE:   Low dose axial images, sagittal and coronal reformations were obtained from the thoracic inlet to the pubic synthesis without contrast     COMPARISON:   CT 01/02/2022     FINDINGS:   Base of neck structures in thoracic soft tissues are within normal limits.     Postoperative changes of LVAD placement.  Unable to assess cannula patency on noncontrast exam.  No evidence of focal fluid collection or abscess.  Stable subcutaneous soft tissue induration about the drive line catheter course within the midline ventral abdominal wall (series 3, image 60).  No  detrimental interval change.     Heart is stable in size.  No pericardial effusion.  Thoracic aorta is normal in caliber.     Stable mildly prominent mediastinal lymph nodes.  No new adenopathy.     Prominence of the bilateral hilar vasculature and peribronchial thickening.  Mild interlobular septal thickening within the bilateral upper lobes with patchy ground-glass density.  Findings are suggestive for mild pulmonary edema.  No focal alveolar consolidation.  No significant pleural effusion or pneumothorax.     Liver is normal in size and attenuation.  No focal hepatic lesion detected noting decreased sensitivity on noncontrast exam.  Gallbladder is contracted and contains a calcified stone.  No biliary ductal dilatation.     Spleen is upper limits of normal in size.     Adrenal glands and pancreas are unremarkable.     Stable lobular contour of the right kidney which may represent cortical scarring.  No renal stone.  No hydroureteronephrosis.  Urinary bladder is decompressed.     Small and large bowel are normal caliber.  No evidence of bowel obstruction or inflammation.  Colonic diverticulosis.     Increased soft tissue thickening about the distal superior mesenteric vascular with apparent vessel enlargement (series 3, image 92).  Engorged distal small vessel mesenteric vasculature and surrounding edema/stranding.  Limited evaluation given lack of intravenous contrast.     Normal caliber abdominal aorta.     Fat containing umbilical hernia.     Status post intramedullary damaris fixation of the bilateral femurs.  Degenerative changes of the osseous structures.  Stable peripherally sclerotic lucent lesion within the left iliac bone.  Chronic right rib fractures.  No acute fracture detected.  Sternotomy wires noted.    Impression:       1. Increased soft tissue thickening about the distal superior mesenteric vasculature with apparent vessel enlargement.  Engorged distal small vessel mesenteric vasculature and  surrounding edema/stranding.  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  Differential also includes mesenteric panniculitis however felt less likely.   2. Stable postoperative changes of LVAD placement.  Stable soft tissue induration about the drive line catheter within the ventral abdominal wall.  No focal fluid collection or abscess.   3. Mild bilateral interlobular septal thickening and ground-glass density suggestive for mild pulmonary edema.   4. Cholelithiasis.   5. Additional findings as above.   This report was flagged in Epic as abnormal.       Electronically signed by: Jimenez Gardner   Date: 01/09/2022   Time: 11:13   X-Ray Chest AP Portable [[id number]] Resulted: 01/05/22 1320   Order Status: Completed Updated: 01/05/22 1322   Narrative:     EXAMINATION:   XR CHEST AP PORTABLE     CLINICAL HISTORY:   cough;     TECHNIQUE:   Single frontal view of the chest was performed.     COMPARISON:   01/03/2022     FINDINGS:   Stable mild cardiomegaly.     Stable mild pulmonary venous congestion.     The previously depicted lines, tubes, and postsurgical changes appear similar.  The latter include a continuous flow left ventricular assistant device.    Impression:       No significant interval change since prior exam noted.       Electronically signed by: Christine Dotson   Date: 01/05/2022   Time: 13:20       Imaging History    2022  Date Procedure Name Status Accession Number Location   01/09/22 10:36 AM CT Chest Abdomen Pelvis Without Contrast (XPD) Final [id number] [facility]   01/05/22 12:58 PM X-Ray Chest AP Portable Final [id number] [facility]   01/03/22 05:17 PM X-Ray Chest AP Portable Final [id number] [facility]   01/02/22 08:51 PM CT Chest Abdomen Pelvis Without Contrast (XPD) Final [id number] [facility]   01/02/22 06:54 PM X-Ray Chest AP Portable Final [id number] [facility]   01/01/22 12:06 AM X-Ray Chest 1 View Final [id number] [facility]   01/11/22 09:28 AM Echo Final [id number] [facility]   2021  Date Procedure Name  Status Accession Number Location   12/31/21 12:00 AM CARDIAC MONITORING STRIPS Final

## 2022-01-14 NOTE — PLAN OF CARE
Plan of care discussed with patient.  Patient ambulating withj assistance, fall precautions in place.  Smooth LVAD hum. Patient has no complaints of pain. IV daptomycin maintained. Patient has no questions at this time. Will continue to monitor.

## 2022-01-14 NOTE — PROGRESS NOTES
"[facility]  Palliative Medicine  Progress Note    Patient Name: Saba Lott  MRN: [id number]  Admission Date: 12/31/2021  Hospital Length of Stay: 14 days  Code Status: Full Code   Attending Provider: Santosh Avalos MD  Consulting Provider: Kelsey Padilla NP  Primary Care Physician: Mary Lombardi MD  Principal Problem:Fever    Patient information was obtained from patient and primary team.      Assessment/Plan:     Palliative Encounter  -Pt confirms FULL CODE status/full treatment  -Discussed hospice in detail.  Pt is most interested in being discharged from the hospital when medically stable.  He does not want to discontinue treatment and his goal is to prolong life, even if this means frequent hospitalizations and/or decreased quality of life. Hospice does not align with pt's goals and preference of care.   -Pt open to home-based palliative care which could assist with symptom management, help to avoid preventable hospitalizations, and provide ongoing GOC discussions.  HOME BASED PALLIATIVE CARE AVAILABLE IN PT'S AREA: [facility] ([phone]).    I will follow-up with patient. Please contact us if you have any additional questions.    Subjective:     Palliative Encounter  Palliative follow up visit to continue goals of care discussions.  Pt tells me that he discussed hospice with primary team this morning and that he wants to go home on home hospice.  When asked what hospice means, he states that it means he can go home and he wants to go home. We reviewed discussion yesterday, during which pt clearly stated that he want to "live as long as I can", even if that means frequent hospitalizations, machines, decreased quality of life.  When asked what he thinks hospice would be like, he states that he wants to have the same type of care as in the hospital but at home.  He wants to continue current treatment, including frequent lab work, etc.  He says he would not " want the option to be comfortable and have a better quality of life if this means sacrificing time.  For him, time is most important, even if it means decreased quality of life.  When discussing code status options, he states clearly that his choice would be that he does want CPR and intubation even if it means being bedbound. He does state that he may not wish to be intubated long-term but he does want CPR/resucitation attempted. His goal is to prolong life, which does not align with hospice enrollment. We discussed the possibility of home-based palliative care which can help to provide symptom management, avoid preventable hospitalizations, and ongoing GOC conversations and he is open to this option.       Medications:  Continuous Infusions:  Scheduled Meds:   atorvastatin  20 mg Oral QHS    DAPTOmycin (CUBICIN)  IV  10 mg/kg Intravenous Q24H    docusate sodium  100 mg Oral Daily    Lactobacillus rhamnosus GG  1 capsule Oral Daily    lisinopriL  5 mg Oral Daily    magnesium oxide  800 mg Oral TID    piperacillin-tazobactam (ZOSYN) IVPB  4.5 g Intravenous Q8H    triamcinolone acetonide 0.025%   Topical (Top) BID    warfarin  4 mg Oral Daily     PRN Meds:acetaminophen, albuterol-ipratropium, aluminum & magnesium hydroxide-simethicone, benzonatate, diphenhydrAMINE, guaiFENesin 100 mg/5 ml, ondansetron    Objective:     Vital Signs (Most Recent):  Temp: 99.5 °F (37.5 °C) (01/14/22 1204)  Pulse: 78 (01/14/22 1204)  Resp: 20 (01/14/22 1204)  BP: (!) 64/0 (01/14/22 1452)  SpO2: (!) 93 % (01/14/22 1204) Vital Signs (24h Range):  Temp:  [98.1 °F (36.7 °C)-100.2 °F (37.9 °C)] 99.5 °F (37.5 °C)  Pulse:  [] 78  Resp:  [18-20] 20  SpO2:  [93 %-98 %] 93 %  BP: ()/(0-88) 64/0     Weight: 89.1 kg (196 lb 6.9 oz)  Body mass index is 30.77 kg/m².    Physical Exam  Vitals and nursing note reviewed.   Constitutional:       General: He is not in acute distress.  HENT:      Head: Normocephalic and atraumatic.       Right Ear: External ear normal.      Left Ear: External ear normal.      Nose: Nose normal. No congestion or rhinorrhea.   Eyes:      General:         Right eye: No discharge.         Left eye: No discharge.   Cardiovascular:      Rate and Rhythm: Normal rate.   Pulmonary:      Effort: Pulmonary effort is normal. No respiratory distress.   Musculoskeletal:      Cervical back: Normal range of motion.   Skin:     General: Skin is dry.   Neurological:      Mental Status: He is alert and oriented to person, place, and time.   Psychiatric:         Mood and Affect: Mood normal.         Behavior: Behavior normal.         Review of Symptoms    Symptom Assessment (ESAS 0-10 Scale)  Pain:  0  Dyspnea:  0  Anxiety:  0  Nausea:  0  Depression:  0  Anorexia:  0  Fatigue:  0  Insomnia:  0  Restlessness:  0  Agitation:  0         Modified Nan Scale:  0    Performance Status:  50    Living Arrangements:  Lives alone    Psychosocial/Cultural: Lives alone, near his parents, who assist as needed        Advance Care Planning   Advance Directives:   Living Will: No    LaPOST: No    Do Not Resuscitate Status: No    Medical Power of : Yes      Decision Making:  Patient answered questions         Significant Labs: All pertinent labs within the past 24 hours have been reviewed.  CBC:   Recent Labs   Lab 01/14/22 0448   WBC 14.55*   HGB 7.1*   HCT 24.5*   MCV 62*   *     BMP:  Recent Labs   Lab 01/14/22 0448   GLU 90   *   K 4.0      CO2 22*   BUN 6   CREATININE 0.8   CALCIUM 8.0*   MG 1.6     LFT:  Lab Results   Component Value Date    AST 19 01/14/2022    ALKPHOS 73 01/14/2022    BILITOT 0.6 01/14/2022     Albumin:   Albumin   Date Value Ref Range Status   01/14/2022 1.9 (L) 3.5 - 5.2 g/dL Final   05/04/2020 4.0  Final     Protein:   Total Protein   Date Value Ref Range Status   01/14/2022 6.8 6.0 - 8.4 g/dL Final     Lactic acid:   Lab Results   Component Value Date    LACTATE 1.6 01/01/2022    LACTATE 2.1  10/05/2021       Significant Imaging: I have reviewed all pertinent imaging results/findings within the past 24 hours.      More than 50% of 35 min spent in chart review, advance care planning, care coordination, face-to-face encounter, communication with team members.     Kelsey Padilla, JUAN  Palliative Medicine  [facility]

## 2022-01-14 NOTE — PROGRESS NOTES
UPDATE    SW to Pt's room for ongoing support & needs assessment. Pt presents as AAO x4, calm, cooperative, and asking and answering questions appropriately. Pt reports feeling improved today, however SW notes Pt with cough. Pt shares that bedside nurse is aware of cough and will be bringing Pt medicine. Pt reports coping well with hospitalization and reports understanding of d/c next week.     SW reviewed d/c planning with Pt today. Prior to SW visit, team advised that Pt may be interested in Hospice services. SW inquiried re: Pt's experience with hospice - Pt shares he's had none. Pt also not able to explain hospice/end-of-life philosophy to SW today. SW inquired if Pt would be interested in meeting with a hospice representative to discuss Hospice services and if they align with Pt's care goals. Pt expressed interest today. Pt denies further needs or concerns at this time & expresses appreciation for visit.    SW rec'd call from ESTEVAN Romero LCSW from Palliative team re: Pt care. VANNESA Romero notes Pt does not appear to be onboard with Hospice philosophy. Palliative team advises Hospice may not be appropriate d/c option at this time. VANNESA Romero advises of home based Palliative Care program through [facility] ([phone]) who confirms they can visit Pt in his home in [place]. SW placed call to team CONG Melton advising of above.     SW providing ongoing psychosocial, counseling, & emotional support, education, resources, assistance, and discharge planning as indicated.  SW to continue to follow.

## 2022-01-14 NOTE — ASSESSMENT & PLAN NOTE
Procedure: Device Interrogation Including analysis of device parameters  Patient had DLES dressing intact this AM. Reportedly less itchy (getting IV benadryl and dressing changes with soap/water instead of chlorhexidine.)   Current Settings: Ventricular Assist Device  Review of device function is stable/unstable    TXP LVAD INTERROGATIONS 1/14/2022 1/14/2022 1/13/2022 1/13/2022 1/13/2022 1/13/2022 1/13/2022   Type HeartMate3 HeartMate3 HeartMate3 HeartMate3 HeartMate3 HeartMate3 HeartMate3   Flow 4.9 5.0 5.0 4.9 5.0 5.0 4.9   Speed 5300 5300 5300 5300 5300 5300 5300   PI 2.8 3.7 3.8 3.9 2.6 2.8 2.9   Power (Centeno) 3.8 3.8 3.7 3.8 3.9 3.8 3.8   LSL 4900 4900 4900 4900 4900 - 4900   Pulsatility - Intermittent pulse Intermittent pulse Intermittent pulse - - -

## 2022-01-14 NOTE — PLAN OF CARE
Plan of care discussed with patient.  Patient ambulating independently, fall precautions in place. LVAD DP and numbers WNL, smooth LVAD hum. Patient has no complaints of pain. Pt has been refusing to keep his DL dressing on through the shift due to excessive itching at the area. RN conducted most recent dressing change with soap and water and administered IV benadryl to help prevent itching and skin irritation. Discussed medications and care.  Patient has no questions at this time. Will continue to monitor.

## 2022-01-14 NOTE — SUBJECTIVE & OBJECTIVE
"Palliative Encounter  Palliative follow up visit to continue goals of care discussions.  Pt tells me that he discussed hospice with primary team this morning and that he wants to go home on home hospice.  When asked what hospice means, he states that it means he can go home and he wants to go home. We reviewed discussion yesterday, during which pt clearly stated that he want to "live as long as I can", even if that means frequent hospitalizations, machines, decreased quality of life.  When asked what he thinks hospice would be like, he states that he wants to have the same type of care as in the hospital but at home.  He wants to continue current treatment, including frequent lab work, etc.  He says he would not want the option to be comfortable and have a better quality of life if this means sacrificing time.  For him, time is most important, even if it means decreased quality of life.  When discussing code status options, he states clearly that his choice would be that he does want CPR and intubation even if it means being bedbound. He does state that he may not wish to be intubated long-term but he does want CPR/resucitation attempted. His goal is to prolong life, which does not align with hospice enrollment. We discussed the possibility of home-based palliative care which can help to provide symptom management, avoid preventable hospitalizations, and ongoing GOC conversations and he is open to this option.       Medications:  Continuous Infusions:  Scheduled Meds:   atorvastatin  20 mg Oral QHS    DAPTOmycin (CUBICIN)  IV  10 mg/kg Intravenous Q24H    docusate sodium  100 mg Oral Daily    Lactobacillus rhamnosus GG  1 capsule Oral Daily    lisinopriL  5 mg Oral Daily    magnesium oxide  800 mg Oral TID    piperacillin-tazobactam (ZOSYN) IVPB  4.5 g Intravenous Q8H    triamcinolone acetonide 0.025%   Topical (Top) BID    warfarin  4 mg Oral Daily     PRN Meds:acetaminophen, albuterol-ipratropium, " aluminum & magnesium hydroxide-simethicone, benzonatate, diphenhydrAMINE, guaiFENesin 100 mg/5 ml, ondansetron    Objective:     Vital Signs (Most Recent):  Temp: 99.5 °F (37.5 °C) (01/14/22 1204)  Pulse: 78 (01/14/22 1204)  Resp: 20 (01/14/22 1204)  BP: (!) 64/0 (01/14/22 1452)  SpO2: (!) 93 % (01/14/22 1204) Vital Signs (24h Range):  Temp:  [98.1 °F (36.7 °C)-100.2 °F (37.9 °C)] 99.5 °F (37.5 °C)  Pulse:  [] 78  Resp:  [18-20] 20  SpO2:  [93 %-98 %] 93 %  BP: ()/(0-88) 64/0     Weight: 89.1 kg (196 lb 6.9 oz)  Body mass index is 30.77 kg/m².    Physical Exam  Vitals and nursing note reviewed.   Constitutional:       General: He is not in acute distress.  HENT:      Head: Normocephalic and atraumatic.      Right Ear: External ear normal.      Left Ear: External ear normal.      Nose: Nose normal. No congestion or rhinorrhea.   Eyes:      General:         Right eye: No discharge.         Left eye: No discharge.   Cardiovascular:      Rate and Rhythm: Normal rate.   Pulmonary:      Effort: Pulmonary effort is normal. No respiratory distress.   Musculoskeletal:      Cervical back: Normal range of motion.   Skin:     General: Skin is dry.   Neurological:      Mental Status: He is alert and oriented to person, place, and time.   Psychiatric:         Mood and Affect: Mood normal.         Behavior: Behavior normal.         Review of Symptoms    Symptom Assessment (ESAS 0-10 Scale)  Pain:  0  Dyspnea:  0  Anxiety:  0  Nausea:  0  Depression:  0  Anorexia:  0  Fatigue:  0  Insomnia:  0  Restlessness:  0  Agitation:  0         Modified Nan Scale:  0    Performance Status:  50    Living Arrangements:  Lives alone    Psychosocial/Cultural: Lives alone, near his parents, who assist as needed        Advance Care Planning   Advance Directives:   Living Will: No    LaPOST: No    Do Not Resuscitate Status: No    Medical Power of : Yes      Decision Making:  Patient answered questions         Significant Labs:  All pertinent labs within the past 24 hours have been reviewed.  CBC:   Recent Labs   Lab 01/14/22 0448   WBC 14.55*   HGB 7.1*   HCT 24.5*   MCV 62*   *     BMP:  Recent Labs   Lab 01/14/22 0448   GLU 90   *   K 4.0      CO2 22*   BUN 6   CREATININE 0.8   CALCIUM 8.0*   MG 1.6     LFT:  Lab Results   Component Value Date    AST 19 01/14/2022    ALKPHOS 73 01/14/2022    BILITOT 0.6 01/14/2022     Albumin:   Albumin   Date Value Ref Range Status   01/14/2022 1.9 (L) 3.5 - 5.2 g/dL Final   05/04/2020 4.0  Final     Protein:   Total Protein   Date Value Ref Range Status   01/14/2022 6.8 6.0 - 8.4 g/dL Final     Lactic acid:   Lab Results   Component Value Date    LACTATE 1.6 01/01/2022    LACTATE 2.1 10/05/2021       Significant Imaging: I have reviewed all pertinent imaging results/findings within the past 24 hours.

## 2022-01-14 NOTE — PLAN OF CARE
Problem: Occupational Therapy Goal  Goal: Occupational Therapy Goal  Description: Goals to be met by: 2/1/22     Patient will increase functional independence with ADLs by performing:    UE Dressing with [place].  LE Dressing with [place].  Grooming while standing at sink with [place].  Toileting from toilet with [place] for hygiene and clothing management.   Bathing from  shower chair/bench with [place].  Toilet transfer to toilet with [place].  Increased functional strength to WFL for B UE.  Upper extremity exercise program x15 reps per handout, with independence.    Outcome: Met

## 2022-01-14 NOTE — PT/OT/SLP PROGRESS
Occupational Therapy   Treatment/Discharge    Name: Saba Lott  MRN: [id number]  Admitting Diagnosis:  Fever       Recommendations:     Discharge Recommendations: home health PT  Discharge Equipment Recommendations:  none  Barriers to discharge:  None    Assessment:     Saba Lott is a 56 y.o. male with a medical diagnosis of Fever.  He presents performing ADL safely and without A in room. Pt demonstrates ability to maneuver IV pole and LVAD while mobilizing to bathroom, completing toileting and performing self-care tasks at sink. Pt required A and use of AE prior to admit for LB dressing. Pt with no LOB or SOB. Performance deficits affecting function are impaired functional mobilty,impaired cardiopulmonary response to activity.     Rehab Prognosis:  Good; patient is no longer in need of acute skilled OT services to address these deficits and reach maximum level of function.       Plan:     Patient to be seen 3 x/week to address the above listed problems via self-care/home management,therapeutic activities,therapeutic exercises  · Plan of Care Expires: 02/01/22  · Plan of Care Reviewed with: patient    Subjective     Pain/Comfort:  · Pain Rating 1: 0/10  · Pain Rating Post-Intervention 1: 0/10    Objective:     Communicated with: RN prior to session.  Patient found supine with telemetry,peripheral IV,LVAD upon OT entry to room.    General Precautions: Standard, fall,airborne,droplet,contact   Orthopedic Precautions:N/A   Braces:    Respiratory Status: Room air     Occupational Performance:     Bed Mobility:    · Mod (I)     Functional Mobility/Transfers:  · Patient completed Sit <> Stand Transfer with modified independence  with  no assistive device   · Patient completed Toilet Transfer Step Transfer and STS technique with modified independence with  no AD  · Functional Mobility: Supervision to/from bathroom    Activities of Daily Living:  · Feeding:  independence    · Grooming: independence    · Upper Body  Dressing: modified independence    · Lower Body Dressing: minimum assistance    · Toileting: modified independence        [facility] 6 Click ADL: 22    Treatment & Education:  Pt ed on OT POC  Pt ed on importance of OOB and activity participation  Pt ed on continued ADL and mobility to bathroom    Patient left up in chair with all lines intact, call button in reach and RN notifiedEducation:      GOALS:   Multidisciplinary Problems     Occupational Therapy Goals     Not on file          Multidisciplinary Problems (Resolved)        Problem: Occupational Therapy Goal    Goal Priority Disciplines Outcome Interventions   Occupational Therapy Goal   (Resolved)     OT, PT/OT Met    Description: Goals to be met by: 2/1/22     Patient will increase functional independence with ADLs by performing:    UE Dressing with [place].  LE Dressing with [place].  Grooming while standing at sink with [place].  Toileting from toilet with [place] for hygiene and clothing management.   Bathing from  shower chair/bench with [place].  Toilet transfer to toilet with [place].  Increased functional strength to WFL for B UE.  Upper extremity exercise program x15 reps per handout, with independence.                     Time Tracking:     OT Date of Treatment: 01/14/22  OT Start Time: 1136  OT Stop Time: 1152  OT Total Time (min): 16 min    Billable Minutes:Self Care/Home Management 16          KUSHAL Visit Number: 1 1/14/2022

## 2022-01-14 NOTE — NURSING
"LVAD dressing change completed using sterile technique with soap and water. DLES is a "1" with minimal drainage noted on the drain sponge. IV benadryl administered prior to dressing change to avoid Pt scratching. Pt has multiple skin tears above DL site from scratching site throughout the day. RN instructed once again to not scratch the dressing. Tolerated without any complication.     "

## 2022-01-14 NOTE — SUBJECTIVE & OBJECTIVE
**Interval History: Less itchy today (left his Driveline dressing on this AM). Most recent Blood cultures (1/12) NGTD x 2 days. Afebrile w/ Tmax 99.4. Palliative medicine following. Dispo is home hospice vs. Home w/ vs home-based palliative care.     Continuous Infusions:  Scheduled Meds:   atorvastatin  20 mg Oral QHS    DAPTOmycin (CUBICIN)  IV  10 mg/kg Intravenous Q24H    docusate sodium  100 mg Oral Daily    Lactobacillus rhamnosus GG  1 capsule Oral Daily    lisinopriL  5 mg Oral Daily    magnesium oxide  800 mg Oral TID    piperacillin-tazobactam (ZOSYN) IVPB  4.5 g Intravenous Q8H    triamcinolone acetonide 0.025%   Topical (Top) BID    warfarin  4 mg Oral Daily     PRN Meds:acetaminophen, albuterol-ipratropium, aluminum & magnesium hydroxide-simethicone, benzonatate, diphenhydrAMINE, guaiFENesin 100 mg/5 ml, ondansetron    Review of patient's allergies indicates:   Allergen Reactions    Iodine and iodide containing products      Objective:     Vital Signs (Most Recent):  Temp: 100.2 °F (37.9 °C) (01/14/22 1022)  Pulse: (!) 113 (01/14/22 0659)  Resp: 18 (01/14/22 1022)  BP: (!) 72/0 (01/14/22 1022)  SpO2: 98 % (01/14/22 1022) Vital Signs (24h Range):  Temp:  [97.6 °F (36.4 °C)-100.2 °F (37.9 °C)] 100.2 °F (37.9 °C)  Pulse:  [104-114] 113  Resp:  [18] 18  SpO2:  [94 %-98 %] 98 %  BP: (62-78)/(0) 72/0     Patient Vitals for the past 72 hrs (Last 3 readings):   Weight   01/12/22 0900 89.1 kg (196 lb 6.9 oz)     Body mass index is 30.77 kg/m².      Intake/Output Summary (Last 24 hours) at 1/14/2022 1139  Last data filed at 1/14/2022 0400  Gross per 24 hour   Intake 120 ml   Output 400 ml   Net -280 ml       Hemodynamic Parameters:     Telemetry: ST with PVC's    Physical Exam  Constitutional:       Appearance: Normal appearance.   HENT:      Head: Normocephalic and atraumatic.   Eyes:      Conjunctiva/sclera: Conjunctivae normal.   Neck:      Comments: Neck veins are not elevated  Cardiovascular:       Rate and Rhythm: Regular rhythm. Tachycardia present.      Comments: Smooth VAD hum  Pulmonary:      Effort: Pulmonary effort is normal.      Breath sounds: Normal breath sounds.   Abdominal:      General: Bowel sounds are normal. There is distension.      Comments: LVAD dressing c/d/i.    Musculoskeletal:         General: No swelling. Normal range of motion.      Cervical back: Normal range of motion and neck supple.   Skin:     General: Skin is warm and dry.      Capillary Refill: Capillary refill takes 2 to 3 seconds.   Neurological:      General: No focal deficit present.      Mental Status: He is alert and oriented to person, place, and time.   Psychiatric:         Mood and Affect: Mood normal.         Behavior: Behavior normal.         Thought Content: Thought content normal.         Judgment: Judgment normal.         Significant Labs:  CBC:  Recent Labs   Lab 01/12/22  0327 01/13/22  0547 01/14/22 0448   WBC 15.73* 17.51* 14.55*   RBC 4.09* 4.08* 3.97*   HGB 7.0* 7.3* 7.1*   HCT 24.5* 25.6* 24.5*   * 547* 560*   MCV 60* 63* 62*   MCH 17.1* 17.9* 17.9*   MCHC 28.6* 28.5* 29.0*     BNP:  Recent Labs   Lab 01/10/22  0539 01/12/22 0327 01/14/22 0448   * 637* 488*     CMP:  Recent Labs   Lab 01/10/22  0538 01/11/22  0531 01/12/22 0327 01/13/22  0547 01/14/22 0448      < > 99 130* 90   CALCIUM 8.5*   < > 8.1* 8.3* 8.0*   ALBUMIN 2.1*  --  2.2*  --  1.9*   PROT 7.1  --  7.3  --  6.8   *   < > 133* 134* 131*   K 4.1   < > 3.9 4.6 4.0   CO2 24   < > 22* 22* 22*      < > 103 103 100   BUN 6   < > 5* 6 6   CREATININE 1.0   < > 0.8 0.9 0.8   ALKPHOS 70  --  72  --  73   ALT 19  --  17  --  13   AST 33  --  23  --  19   BILITOT 0.6  --  0.6  --  0.6    < > = values in this interval not displayed.      Coagulation:   Recent Labs   Lab 01/12/22 0327 01/13/22  0547 01/14/22  0448   INR 1.9* 1.5* 1.4*   APTT 37.5* 32.4* 32.4*     LDH:  Recent Labs   Lab 01/12/22 0327 01/13/22  0547  01/14/22  0448   * 314* 285*     Microbiology:  Microbiology Results (last 7 days)     Procedure Component Value Units Date/Time    Blood culture [[id number]] Collected: 01/12/22 1716    Order Status: Completed Specimen: Blood Updated: 01/13/22 2012     Blood Culture, Routine No Growth to date      No Growth to date    Narrative:      From 2 different sites 30 minutes apart  Collection has been rescheduled by PAW at 01/12/2022 16:43 Reason:   labs due for 1642  Collection has been rescheduled by PAW at 01/12/2022 16:43 Reason:   labs due for 1642    Blood culture [[id number]] Collected: 01/12/22 1715    Order Status: Completed Specimen: Blood Updated: 01/13/22 2012     Blood Culture, Routine No Growth to date      No Growth to date    Narrative:      From 2 different sites 30 minutes apart  Collection has been rescheduled by PAW at 01/12/2022 16:43 Reason:   labs due for 1642  Collection has been rescheduled by PAW at 01/12/2022 16:43 Reason:   labs due for 1642    Clostridium difficile EIA [[id number]]     Order Status: No result Specimen: Stool     Clostridium difficile EIA [[id number]]     Order Status: Canceled Specimen: Stool     Blood culture [[id number]] Collected: 01/07/22 0604    Order Status: Completed Specimen: Blood Updated: 01/12/22 1012     Blood Culture, Routine No growth after 5 days.    Narrative:      From 2 different sites 30 minutes apart    Blood culture [[id number]] Collected: 01/07/22 0604    Order Status: Completed Specimen: Blood Updated: 01/12/22 1012     Blood Culture, Routine No growth after 5 days.    Narrative:      From 2 different sites 30 minutes apart    Culture, Anaerobe [[id number]] Collected: 01/03/22 1822    Order Status: Completed Specimen: Wound from Abdomen Updated: 01/10/22 0850     Anaerobic Culture No anaerobes isolated    Blood culture [[id number]] Collected: 01/04/22 0425    Order Status: Completed Specimen: Blood Updated: 01/09/22 1012     Blood Culture, Routine No  growth after 5 days.    Blood culture [[id number]] Collected: 01/04/22 0420    Order Status: Completed Specimen: Blood Updated: 01/09/22 1012     Blood Culture, Routine No growth after 5 days.    Blood culture [[id number]] Collected: 01/03/22 1723    Order Status: Completed Specimen: Blood Updated: 01/08/22 2012     Blood Culture, Routine No growth after 5 days.    Blood culture [[id number]] Collected: 01/03/22 1723    Order Status: Completed Specimen: Blood Updated: 01/08/22 2012     Blood Culture, Routine No growth after 5 days.    Blood culture [[id number]]  (Abnormal) Collected: 01/02/22 1946    Order Status: Completed Specimen: Blood Updated: 01/08/22 1226     Blood Culture, Routine Gram stain aer bottle: Gram positive cocci in clusters resembling Staph      Positive results previously called      METHICILLIN RESISTANT STAPHYLOCOCCUS AUREUS  ID consult required at [facility].  For susceptibility see order #[id number]      Blood culture [[id number]]  (Abnormal) Collected: 01/02/22 1946    Order Status: Completed Specimen: Blood Updated: 01/08/22 1013     Blood Culture, Routine Gram stain uzma bottle: Gram positive cocci in clusters resembling Staph      Results called to and read back by:Khai Durand RN 01/04/2022  22:54      Gram stain aer bottle: Gram positive cocci       Positive results previously called  01/05/2022     Comment: Previous comment was modified by NEG NEG at 11:38 on 01/05/2022 Gram   stain uzma bottle: Gram positive cocci in clusters resembling   StaphResults called to and read back by:Khai Durand RN 01/04/2022 22:54           METHICILLIN RESISTANT STAPHYLOCOCCUS AUREUS  ID consult required at [facility].  For susceptibility see order #[id number]            I have reviewed all pertinent labs within the past 24 hours.    Estimated Creatinine Clearance: 108.5 mL/min (based on SCr of 0.8 mg/dL).    Diagnostic Results:  I have reviewed all  pertinent imaging results/findings within the past 24 hours.

## 2022-01-14 NOTE — PROGRESS NOTES
01/14/2022  Miko Rivera    Current provider:  Santosh Avalos MD    TXP LVAD INTERROGATIONS 1/14/2022 1/14/2022 1/13/2022 1/13/2022 1/13/2022 1/13/2022 1/13/2022   Type HeartMate3 HeartMate3 HeartMate3 HeartMate3 HeartMate3 HeartMate3 HeartMate3   Flow 4.9 5.0 5.0 4.9 5.0 5.0 4.9   Speed 5300 5300 5300 5300 5300 5300 5300   PI 2.8 3.7 3.8 3.9 2.6 2.8 2.9   Power (Cent[place]) 3.8 3.8 3.7 3.8 3.9 3.8 3.8   LSL 4900 4900 4900 4900 4900 - 4900   Pulsatility - Intermittent pulse Intermittent pulse Intermittent pulse - - -          Rounded on Saba A Oren to ensure all mechanical assist device settings (IABP or VAD) were appropriate and all parameters were within limits.  I was able to ensure all back up equipment was present, the staff had no issues, and the Perfusion Department daily rounding was complete.    In emergency, the nursing units have been notified to contact the perfusion department either by:  Calling [phone] from 630am to 4pm Mon thru Fri, or by contacting the hospital  from 4pm to 630am and on weekends and asking to speak with the perfusionist on call.    10:56 AM

## 2022-01-14 NOTE — PT/OT/SLP PROGRESS
"Physical Therapy Treatment    Patient Name:  Saba Lott   MRN:  [id number]    Recommendations:     Discharge Recommendations:  home health PT   Discharge Equipment Recommendations: none   Barriers to discharge: None    Assessment:     Saba Lott is a 56 y.o. male admitted with a medical diagnosis of Fever.  He presents with the following impairments/functional limitations:  impaired endurance,impaired functional mobilty,gait instability,impaired cardiopulmonary response to activity. The patient's mobility is primarily limited due to generalized weakness, impaired cardiopulmonary endurance. Ambulated 140' with no Ad and stand by assistance, gait tolerance this session limited by feeling dizzy. Patient remained dizzy with seated rest break, wheeled patient back to room in wheelchair. Unable to obtain vital signs, flow rate >4. Dizziness subsided in supine- RN alerted. He would benefit from HH therapy to address the above deficits.     Rehab Prognosis: Good; patient would benefit from acute skilled PT services to address these deficits and reach maximum level of function.    Recent Surgery: * No surgery found *      Plan:     During this hospitalization, patient to be seen 3 x/week to address the identified rehab impairments via gait training,therapeutic activities,therapeutic exercises and progress toward the following goals:    · Plan of Care Expires:  01/31/22    Subjective     Chief Complaint: "I feel bad, I just feel weak"  Patient/Family Comments/goals: increase activity tolerance  Pain/Comfort:  Pain Rating 1: 0/10      Objective:     Communicated with RN prior to session.  Patient found left sidelying with telemetry,peripheral IV,LVAD upon PT entry to room.     General Precautions: Standard, fall,contact   Orthopedic Precautions:N/A   Braces: N/BRANDI  Respiratory Status: Room air     Functional Mobility:    Bed Mobility  Sit <> supine: stand by assistance    Transfers Sit to Stand:   stand by assistance " "  Gait  Gait Distance: 150 ft with no AD  Assistance Level: stand by assistance  Description: wide SKYLER, increased lateral weight shift L>R, decreased weight shift to R LE in stance phase, decreased step length and hip extension ALEJANDRA, minimal foot clearance with shuffling gait    Patient reported dizziness with gait, remained with seated rest break, returned patient to room via wheelchair, returned to supine, sx resolved    Unable to get VS, flow rate WNL, RN alerted.         Five Times Sit to Stand Test:  How long the patient takes to completed 5 sit to stand form chair with arms folded across chest: 34  "Inability to perform test in less than 13.6 seconds indicates increased disability and Morbidity." (Catarina 2000).  Normative values for community dwelling elderly:   · 60-70y/o: 11.4 seconds  · 70-80y/o: 12.6 seconds  · 80-90y/o: 14.8 seconds         AM-PAC 6 CLICK MOBILITY  Turning over in bed (including adjusting bedclothes, sheets and blankets)?: 4  Sitting down on and standing up from a chair with arms (e.g., wheelchair, bedside commode, etc.): 4  Moving from lying on back to sitting on the side of the bed?: 4  Moving to and from a bed to a chair (including a wheelchair)?: 4  Need to walk in hospital room?: 4  Climbing 3-5 steps with a railing?: 4  Basic Mobility Total Score: 24       Therapeutic Activities and Exercises:   Patient educated on role of therapy, goals of session, benefits of out of bed mobility. Patient agreeable to mobilize with therapy.      Gait training: cued for activity pacing and energy conservation strategies, educated on importance of ambulating 2x day to increase activity tolerance and prevent functional decline   Five times sit to stand to assess function, increase LE strength and activity tolerance.     Patient educated on PT schedule.  Encouraged patient to ambulate, sit up in chair 3x/day to prevent deconditioning during hospitalization. Patient verbalized understanding and " "agreement not to mobilize without RN assist. Patient in agreement with PT POC.      ·  LVAD:   ? Pt found with LVAD on wall power   ? Transition from LVAD wall power > battery power with supervision assistance.  ? Pt educated on importance of checking anchor daily- patient not wearing anchor; patient has been refusing driveline dressing because "it's itchy" in spite of ed on importance of dressing  ? Pt left on wall power at end of therapy session.  ? No alarms sounded during session    Patient requesting sprite and cheezits, RN ok'd, therapist delivered to patient.     Patient left supine with all lines intact and call button in reach..    GOALS:   Multidisciplinary Problems     Physical Therapy Goals        Problem: Physical Therapy Goal    Goal Priority Disciplines Outcome Goal Variances Interventions   Physical Therapy Goal     PT, PT/OT Ongoing, Progressing     Description: Goals to be met by: 2022     Patient will increase functional independence with mobility by performin. Sit to stand transfer with Modified [place]  2. Bed to chair transfer with Supervision using LRAD  3. Gait  x 150 feet with Supervision using LRAD with no seated rest breaks and VSS.   4. Ascend/descend 4 stair with no Handrails Stand-by Assistnce.   5. Lower extremity exercise program x30 reps per handout, with independence                     Time Tracking:     PT Received On: 22  PT Start Time: 1515     PT Stop Time: 1550  PT Total Time (min): 35 min     Billable Minutes: Gait Training 15 and Therapeutic Activity 10    Treatment Type: Treatment  PT/PTA: PT     PTA Visit Number: 0     2022  "

## 2022-01-14 NOTE — PROGRESS NOTES
[facility] - Cardiology Stepdown  Infectious Disease  Progress Note    Patient Name: Saba Lott  MRN: [id number]  Admission Date: 12/31/2021  Length of Stay: 14 days  Attending Physician: Santosh Avalos MD  Primary Care Provider: Mary Lombardi MD    Isolation Status: Special Contact  Assessment/Plan:      * Fever     56-year-old male with history of DCM s/p LVAD 2/6/2020, left occipital IPH, lead endocarditis s/p removal, history of VISA and polymicrobial  bacteremia in October (pseudomonas, e coli, proteus.  Strongy negative) on chronic suppressive doxy, presents from clinic with fevers.    Blood cultures of 1/1 - 2/4 bottles + MRSA  Blood cultures 1/2 - 3/4 MRSA  Blood cultures  1/3, 1/4, 1/7, 1/12 - NGTD   Abdominal wound cx - + Pseudomonas - pan sensitive    CT A/P 1/2/22 - unchanged small region of soft tissue induration and slight skin thickening about the drive line in anterior abdominal wall. No definite focal fluid collection about the LVAD. No definite new regions of soft tissue induration or focal fluid collections are identified about the remaining subcutaneous course of the LVAD driveline  Noted patchy LLL GGO and airspace opacities with additional patchy ground glass attenuation in DEVORAH  Slight non-specific perinephric fat stranding - U/A wnl    CT A/P 1/9 -  Persistent soft tissue induration about the drive line within the ventral abdominal wall.  No focal fluid collection or abscess.  Increased soft tissue thickening about the distal superior mesenteric vasculature with apparent vessel enlargement.  Engorged distal small vessel mesenteric vasculature and surrounding edema/stranding concerning for possible SMV thrombosis (limited assessment on noncontrast exam). Differential also includes mesenteric panniculitis however felt less likely.      Arellano sensitive pseudomonas on ABD wound cx - though wound now appears healed.  No drainage or signs of infection at DLES.     2D echo done: poor  endocardial visualization but MV now shows significant destruction vs prior 2D in 10/2021 and possible flail leaflet.  MACIEL deferred by HTS given frailty and would not change antibiotic plan.      Given recurrent fevers, broadened cefepime to zosyn yesterday (added anaerobic coverage, continue to cover pseudomonas) and continued daptomycin.   Reportedly has been having diarrhea, but nurse has been unable to evaluate and collect stool until today and this is pending.        T max 99.4. WBC 17>>14.5 today.   CRP downtrending.  HDS.  C dif pending,   Most recent repeat blood cultures NGTD.  Concern for possible endovascular infection if there is thrombus in SMV.  ? Source of fevers, though blood cultures have cleared.      Palliative Care following with patient.  Patient continues to affirm his desire to be full code/continue full treatment.  Hospice does not align with goals of care currently.  Patient verbalizing he feels better today, though is anxious to go home.  Pending tunneled PICC placement next week.     Plan:  1.   Continue IV daptomycin 10 mg/kg q 24 hours for MRSA bacteremia. Confirmed susceptibility with Micro lab.  KALI <.5.    2.   Continue zosyn 4.5g EI q 8 hours.  At discharge, for greater ease of administration, transition to zosyn continuous infusion 13.5 g IV q 24 hours.  If continuous infusion, will need to ensure two infusion ports in tunneled PICC.  May need to discuss administration of both with pharmacist.    3.   Anticipate 6 weeks of IV daptomycin and zosyn from date of first negative blood culture - Estimated end date 2/14/22  4.   Resume chronic suppressive doxycycline after completion of IV abx  5.   Weekly cbc, cmp, crp and CK and fax results to ID, quoc Rowan NP, at fax [phone]. (Spectra [phone], office [phone])  6.   ID follow up by virtual visit 14 days and with next scheduled VAD appointment.    7.   Will sign off.  Please call with questions or re-consult as needed    Re-consult if C dif positive.   For any questions or concerns over the weekend, please call CONG Arciniega at [phone] during daytime hours or page ID staff on call.       Data reviewed and plan discussed with ID staff, Dr. Guerrier  Discussed above plan with Primary Team, CONG Lehman     MRSA bacteremia  See assessment/plan above          Outpatient Antibiotic Therapy Plan:    Please send referral to [facility].    1) Infection:  MRSA bacteremia in VAD patient.  Pseudomonal wound infection    2) Discharge Antibiotics:    Intravenous antibiotics:   Daptomycin 10 mg/kg IV q 24 hours    Zosyn 4.5 g IV q 8 hours     3) Therapy Duration:  6 weeks    Estimated end date of IV antibiotics: 2/14/22    4) Outpatient Weekly Labs:    Order the following labs to be drawn on Mondays:    CBC   CMP    CRP   CPK       5) Fax Lab Results to Infectious Diseases Provider:  Harpreet Rowan NP    [facility] Fax Number: [phone]    6) Outpatient Infectious Diseases Follow-up     Follow-up appointment will be arranged by the ID clinic and will be found in the patient's appointments tab.     Prior to discharge, please ensure the patient's follow-up has been scheduled.     If there is still no follow-up scheduled prior to discharge, please send an EPIC message to Coleen Rodriguez in Infectious Diseases.    Thank you.   Please call for any questions or concerns.  DOMINGO Cabezas, ANP-C  Spectra [phone]    Subjective:     Principal Problem:Fever    HPI: Mr. Lott is a 56 y/o M pt with DCM s/p HM3 implant 2/6/2020 c/b MRSA DLES, left occipital IPH with mycotic aneurysm, VISA bacteremia and ICD lead endocarditis s/p lead extraction on 8/9/2021, recent polymicrobial GNR bacteremia s/p  presented with worsening weakness for approx 1 wk and was transferred to [facility] 10/5 for concern for sepsis found to have polymicrobial GNR bacteremia s/p approx 6 wk zosyn (stopped 11/16) presented with with fever  from clinic and was admitted for presumed sepsis. Pt reports feeling weak since day prior, fevers, chills, diarrhea and vomiting x 1. Denies sore throat, HA, abdominal pain, dysuria, sob or cough. No sick contacts. No increased drainage from DLES. Lives alone.    In the ED pt febrile, tachycardic and with WBC 20. CXR with b/l edema.           Interval History:   No AEON.   T max 99.4  WBC downtrending - 14.5  Repeat BCXs 1/7 and 1/12 NGTD  Itching with DLES dressing changes, but kept dressing on today.   Palliative Care continues to follow, but at this time, hospice not consistent with patient goals of care      Review of Systems   Constitutional: Negative for chills, diaphoresis, fatigue and fever.   Respiratory: Negative for cough and shortness of breath.    Cardiovascular: Negative for chest pain and leg swelling.   Gastrointestinal: Positive for diarrhea. Negative for abdominal pain, nausea and vomiting.   Genitourinary: Negative for difficulty urinating, dysuria, flank pain and hematuria.   Musculoskeletal: Negative for arthralgias, back pain, joint swelling, myalgias and neck pain.   Skin: Positive for wound (open wound epigastric area now healed). Negative for rash.   Neurological: Negative for dizziness, weakness, numbness and headaches.   Psychiatric/Behavioral: Negative for agitation. The patient is not nervous/anxious.      Objective:     Vital Signs (Most Recent):  Temp: 98.2 °F (36.8 °C) (01/14/22 0400)  Pulse: (!) 113 (01/14/22 0659)  Resp: 18 (01/14/22 0400)  BP: (!) 78/0 (01/14/22 0400)  SpO2: 95 % (01/14/22 0400) Vital Signs (24h Range):  Temp:  [97.6 °F (36.4 °C)-99.4 °F (37.4 °C)] 98.2 °F (36.8 °C)  Pulse:  [104-114] 113  Resp:  [18] 18  SpO2:  [94 %-96 %] 95 %  BP: (62-78)/(0) 78/0     Weight: 89.1 kg (196 lb 6.9 oz)  Body mass index is 30.77 kg/m².    Estimated Creatinine Clearance: 108.5 mL/min (based on SCr of 0.8 mg/dL).    Physical Exam  Vitals and nursing note reviewed.   Constitutional:        General: He is not in acute distress.     Appearance: He is well-developed. He is obese. He is not ill-appearing, toxic-appearing or diaphoretic.   Cardiovascular:      Rate and Rhythm: Tachycardia present.      Comments: VAD hum  Pulmonary:      Effort: Pulmonary effort is normal. No respiratory distress.      Breath sounds: No wheezing or rales.   Abdominal:      General: There is no distension.      Palpations: Abdomen is soft.      Tenderness: There is no abdominal tenderness. There is no guarding.      Comments: VAD dressing c/d/i  Epigastric wound closed   Musculoskeletal:         General: No swelling or tenderness. Normal range of motion.      Cervical back: Normal range of motion.   Skin:     General: Skin is warm and dry.      Findings: No erythema or rash.   Neurological:      Mental Status: He is alert and oriented to person, place, and time.   Psychiatric:         Behavior: Behavior normal.       Epigastric wound 1/14      DLES 1/13            Significant Labs:   Blood Culture:   Recent Labs   Lab 01/04/22  0420 01/04/22  0425 01/07/22  0604 01/12/22  1715 01/12/22  1716   LABBLOO No growth after 5 days. No growth after 5 days. No growth after 5 days.  No growth after 5 days. No Growth to date  No Growth to date No Growth to date  No Growth to date     CBC:   Recent Labs   Lab 01/13/22  0547 01/14/22  0448   WBC 17.51* 14.55*   HGB 7.3* 7.1*   HCT 25.6* 24.5*   * 560*     CMP:   Recent Labs   Lab 01/13/22  0547 01/14/22  0448   * 131*   K 4.6 4.0    100   CO2 22* 22*   * 90   BUN 6 6   CREATININE 0.9 0.8   CALCIUM 8.3* 8.0*   PROT  --  6.8   ALBUMIN  --  1.9*   BILITOT  --  0.6   ALKPHOS  --  73   AST  --  19   ALT  --  13   ANIONGAP 9 9   EGFRNONAA >60.0 >60.0     Wound Culture:   Recent Labs   Lab 07/23/21  1004 08/09/21  1119 08/09/21  1128 10/05/21  1732 01/03/22  1822   LABAERO METHICILLIN RESISTANT STAPHYLOCOCCUS AUREUS  Moderate  * No growth No growth METHICILLIN  RESISTANT STAPHYLOCOCCUS AUREUS  Rare  * PSEUDOMONAS AERUGINOSA  Few  *       Significant Imaging: I have reviewed all pertinent imaging results/findings within the past 24 hours.   Transthoracic echo (TTE) complete  Order# [id number]  Reading physician: Roman Varghese MD Ordering physician: Mallika Lugo NP Study date: 1/11/22       Reason for Exam  Priority: Routine  Dx: Complication involving left ventricular assist device (LVAD) [T82.9XXA (ICD-10-CM)]     Result Image Hyperlink     Show images for Echo    Summary    · LVIDd 6.60 cm  · The left ventricle is moderately enlarged with eccentric hypertrophy and severely decreased systolic function.  · There is an LVAD present. Base speed is 5300 RPMs. The pump type is a Heartmate III. The interventricular septum appears midline. The aortic valve opens intermittently.  · Severe left atrial enlargement.  · The estimated ejection fraction is 25%.  · Left ventricular diastolic dysfunction.  · Tachycardia was present  · Normal right ventricular size.  · Right ventricle not well visualized due to poor acoustic window.  · Poor endocardial visualization  · Mild tricuspid regurgitation.  · Elevated central venous pressure (15 mmHg).  · The estimated PA systolic pressure is 46 mmHg.  · There has been interval degeneration of the mitral valve in comparison with prior study, suspect partial flail posterior leaflet, cannot exclude significant mitral regurgitation.          CT Chest Abdomen Pelvis Without Contrast (XPD) [[id number]] (Abnormal) Resulted: 01/09/22 1113   Order Status: Completed Updated: 01/09/22 1115   Narrative:     EXAMINATION:   CT CHEST ABDOMEN PELVIS WITHOUT CONTRAST(XPD)     CLINICAL HISTORY:   LVAD pt, DLES infection;     TECHNIQUE:   Low dose axial images, sagittal and coronal reformations were obtained from the thoracic inlet to the pubic synthesis without contrast     COMPARISON:   CT 01/02/2022     FINDINGS:   Base of neck structures in thoracic  soft tissues are within normal limits.     Postoperative changes of LVAD placement.  Unable to assess cannula patency on noncontrast exam.  No evidence of focal fluid collection or abscess.  Stable subcutaneous soft tissue induration about the drive line catheter course within the midline ventral abdominal wall (series 3, image 60).  No detrimental interval change.     Heart is stable in size.  No pericardial effusion.  Thoracic aorta is normal in caliber.     Stable mildly prominent mediastinal lymph nodes.  No new adenopathy.     Prominence of the bilateral hilar vasculature and peribronchial thickening.  Mild interlobular septal thickening within the bilateral upper lobes with patchy ground-glass density.  Findings are suggestive for mild pulmonary edema.  No focal alveolar consolidation.  No significant pleural effusion or pneumothorax.     Liver is normal in size and attenuation.  No focal hepatic lesion detected noting decreased sensitivity on noncontrast exam.  Gallbladder is contracted and contains a calcified stone.  No biliary ductal dilatation.     Spleen is upper limits of normal in size.     Adrenal glands and pancreas are unremarkable.     Stable lobular contour of the right kidney which may represent cortical scarring.  No renal stone.  No hydroureteronephrosis.  Urinary bladder is decompressed.     Small and large bowel are normal caliber.  No evidence of bowel obstruction or inflammation.  Colonic diverticulosis.     Increased soft tissue thickening about the distal superior mesenteric vascular with apparent vessel enlargement (series 3, image 92).  Engorged distal small vessel mesenteric vasculature and surrounding edema/stranding.  Limited evaluation given lack of intravenous contrast.     Normal caliber abdominal aorta.     Fat containing umbilical hernia.     Status post intramedullary damaris fixation of the bilateral femurs.  Degenerative changes of the osseous structures.  Stable peripherally  sclerotic lucent lesion within the left iliac bone.  Chronic right rib fractures.  No acute fracture detected.  Sternotomy wires noted.    Impression:       1. Increased soft tissue thickening about the distal superior mesenteric vasculature with apparent vessel enlargement.  Engorged distal small vessel mesenteric vasculature and surrounding edema/stranding.  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  Differential also includes mesenteric panniculitis however felt less likely.   2. Stable postoperative changes of LVAD placement.  Stable soft tissue induration about the drive line catheter within the ventral abdominal wall.  No focal fluid collection or abscess.   3. Mild bilateral interlobular septal thickening and ground-glass density suggestive for mild pulmonary edema.   4. Cholelithiasis.   5. Additional findings as above.   This report was flagged in Epic as abnormal.       Electronically signed by: Jimenez Gardner   Date: 01/09/2022   Time: 11:13   X-Ray Chest AP Portable [[id number]] Resulted: 01/05/22 1320   Order Status: Completed Updated: 01/05/22 1322   Narrative:     EXAMINATION:   XR CHEST AP PORTABLE     CLINICAL HISTORY:   cough;     TECHNIQUE:   Single frontal view of the chest was performed.     COMPARISON:   01/03/2022     FINDINGS:   Stable mild cardiomegaly.     Stable mild pulmonary venous congestion.     The previously depicted lines, tubes, and postsurgical changes appear similar.  The latter include a continuous flow left ventricular assistant device.    Impression:       No significant interval change since prior exam noted.       Electronically signed by: Christine Dotson   Date: 01/05/2022   Time: 13:20       Imaging History    2022  Date Procedure Name Status Accession Number Location   01/09/22 10:36 AM CT Chest Abdomen Pelvis Without Contrast (XPD) Final [id number] [facility]   01/05/22 12:58 PM X-Ray Chest AP Portable Final [id number] [facility]   01/03/22 05:17 PM  X-Ray Chest AP Portable Final [id number] [facility]   01/02/22 08:51 PM CT Chest Abdomen Pelvis Without Contrast (XPD) Final [id number] [facility]   01/02/22 06:54 PM X-Ray Chest AP Portable Final [id number] [facility]   01/01/22 12:06 AM X-Ray Chest 1 View Final [id number] [facility]   01/11/22 09:28 AM Echo Final [id number] [facility]   2021  Date Procedure Name Status Accession Number Location   12/31/21 12:00 AM CARDIAC MONITORING STRIPS Final

## 2022-01-14 NOTE — ASSESSMENT & PLAN NOTE
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 INR this AM 1.4 (got Vit K 1 mg IV on 1/6 for INR 3.0)  -LDH stable  -ECHO 1/11 at 5300 rpm: LVEDD 6.60, RV not well visualized, interval degeneration of MV, suspect partial flail posterior leaflet, cannot exclude significant MR, mild TR, IVC 15, BRYAN intermittently and septum midline  -Gave Lasix 40 mg IVP yesterday and resumed Lisinopril 5 mg qd given worsening MR. I&O's are not accurate today. As he is eating and drinking very little, with diurese only prn      Procedure: Device Interrogation Including analysis of device parameters  Current Settings: Ventricular Assist Device  Review of device function is stable      TXP LVAD INTERROGATIONS 1/14/2022 1/14/2022 1/13/2022 1/13/2022 1/13/2022 1/13/2022 1/13/2022   Type HeartMate3 HeartMate3 HeartMate3 HeartMate3 HeartMate3 HeartMate3 HeartMate3   Flow 4.9 5.0 5.0 4.9 5.0 5.0 4.9   Speed 5300 5300 5300 5300 5300 5300 5300   PI 2.8 3.7 3.8 3.9 2.6 2.8 2.9   Power (Centeno) 3.8 3.8 3.7 3.8 3.9 3.8 3.8   LSL 4900 4900 4900 4900 4900 - 4900   Pulsatility - Intermittent pulse Intermittent pulse Intermittent pulse - - -

## 2022-01-15 NOTE — PROGRESS NOTES
[facility] - Cardiology Stepdown  Cardiac Electrophysiology  Progress Note    Admission Date: 12/31/2021  Code Status: Full Code   Attending Physician: Santosh Avalos MD   Expected Discharge Date: 1/17/2022  Principal Problem:Fever    Subjective:     Interval History: no acute events overnight. WBC remains elevated. INR 1.6.     ROS otherwise negative  Objective:     Vital Signs (Most Recent):  Temp: 99.2 °F (37.3 °C) (01/15/22 0759)  Pulse: 107 (01/15/22 0759)  Resp: 18 (01/15/22 0759)  BP: (!) 70/0 (01/15/22 0858)  SpO2: 96 % (01/15/22 0759) Vital Signs (24h Range):  Temp:  [98.5 °F (36.9 °C)-100.2 °F (37.9 °C)] 99.2 °F (37.3 °C)  Pulse:  [] 107  Resp:  [16-20] 18  SpO2:  [93 %-100 %] 96 %  BP: ()/(0-88) 70/0     Weight: 95.6 kg (210 lb 12.2 oz)  Body mass index is 33.01 kg/m².     SpO2: 96 %  O2 Device (Oxygen Therapy): room air    Physical Exam   General: NAD. AAO  HENT: EOMI  Neck: supple. No JVD  CV: VAD hum  Resp: CTAB. No increased work of breathing  Ext: warm. No edema.      Significant Labs: All pertinent lab results from the last 24 hours have been reviewed.    Significant Imaging: Reviewed    Assessment and Plan:     Patient is a 55 year old male with a PMHx of DCM s/p Hm3 (2/2020 after presenting to hospital with cardiogenic shock requiring IABP and CRRT), recent ICH 2/2 mycotic aneurysm, MRSA bacteremia, who is presenting to the hospital from an OSH for evaluation/treatment of sepsis and ADHF. Patient is short of breath and uncomfortable at the time of giving history. He reports chills, weakness, cough, sputum production, weight gain, nausea, vomiting and trouble breathing. He went to his PCP office yesterday where his temp was 103 and was sent to the hospital afterwards. His white count is 20 on arrival. He is unable to lay flat. His HR is 102-142 sinus. He needs to be diuresed upon arrival but K is 2.9, ordered IV and PO Kcl, he vomited out oral potassium, IV replacement will be  done.         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 On admit presented with cough, nausea, vomiting, weakness, and temp of 103 F  - Admits to noncompliance with suppressive Doxy PTA  - Blood cultures 1/1 & 1/2 positive for Staph, repeats -ve and blood cx 1/7 with NGTD  - Abdominal culture 1/3 positive for pseudomonas  - Spiked fever again last night Tmax 101, WCC still elevated at 17k  - ID following, vanc changed to dapto on 1/1 given hx of VISA/VRSA/MRSA. Cefepime changed to jacoby 1/2 and then jacoby changed to Zosyn 1/6 for Pseudomonas from epigastric wound cx done 1/3. Zosyn changed to Cefepime 1/10 by ID. Switching back to Zosyn today. (Current antibiotics = daptomycin + Zosyn).   - ID testing fungitell assay today.   - CT c/a/p on admit with left basilar ground-glass and airspace opacities with additional slight ground-glass attenuation in the left upper lobe.  Abdomen distended and tender on 1/9 - CT c/a/p repeated 1/9 and shows increased soft tissue thickening about the distal superior mesenteric vasculature with apparent vessel enlargement.  Engorged distal small vessel mesenteric vasculature and surrounding edema/stranding.  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  Differential also includes mesenteric panniculitis however felt less likely. Stable postoperative changes of LVAD placement.  Stable soft tissue induration about the drive line catheter within the ventral abdominal wall.  No focal fluid collection or abscess. Per Int Cards, no intervention can be done  - Long h/o PICC line issues. Will consult IR for tunneled PICC line for home IV antibiotics if patient does not elect to go home with Hospice. Blood cultures now clear x 48 hours so could get tunneled PICC today. Patient stated this AM he agrees to home hospice. Will hold  off on placing line for now.      Sepsis  -See fever     Complication involving left ventricular assist device (LVAD)  Procedure: Device Interrogation Including analysis of device parameters  Patient had DLES dressing intact this AM. Reportedly less itchy (getting IV benadryl and dressing changes with soap/water instead of chlorhexidine.)   Current Settings: Ventricular Assist Device  Review of device function is stable/unstable     TXP LVAD INTERROGATIONS 1/14/2022 1/14/2022 1/13/2022 1/13/2022 1/13/2022 1/13/2022 1/13/2022   Type HeartMate3 HeartMate3 HeartMate3 HeartMate3 HeartMate3 HeartMate3 HeartMate3   Flow 4.9 5.0 5.0 4.9 5.0 5.0 4.9   Speed 5300 5300 5300 5300 5300 5300 5300   PI 2.8 3.7 3.8 3.9 2.6 2.8 2.9   Power (Centeno) 3.8 3.8 3.7 3.8 3.9 3.8 3.8   LSL 4900 4900 4900 4900 4900 - 4900   Pulsatility - Intermittent pulse Intermittent pulse Intermittent pulse - - -         MRSA bacteremia  -See fever     LVAD (left ventricular assist device) present  -S/P DT HM3 with MVR 2/2/20  -Current speed 5300  -INR goal 1.5-2.0 (hx of ICH). INR this AM 1.4 (got Vit K 1 mg IV on 1/6 for INR 3.0)  -LDH stable  -ECHO 1/11 at 5300 rpm: LVEDD 6.60, RV not well visualized, interval degeneration of MV, suspect partial flail posterior leaflet, cannot exclude significant MR, mild TR, IVC 15, BRYAN intermittently and septum midline  -Gave Lasix 40 mg IVP yesterday and resumed Lisinopril 5 mg qd given worsening MR. I&O's are not accurate today. As he is eating and drinking very little, with diurese only prn        Procedure: Device Interrogation Including analysis of device parameters  Current Settings: Ventricular Assist Device  Review of device function is stable        TXP LVAD INTERROGATIONS 1/14/2022 1/14/2022 1/13/2022 1/13/2022 1/13/2022 1/13/2022 1/13/2022   Type HeartMate3 HeartMate3 HeartMate3 HeartMate3 HeartMate3 HeartMate3 HeartMate3   Flow 4.9 5.0 5.0 4.9 5.0 5.0 4.9   Speed 5300 5300 5300 5300 5300 5300 5300   PI  2.8 3.7 3.8 3.9 2.6 2.8 2.9   Power (Centeno) 3.8 3.8 3.7 3.8 3.9 3.8 3.8   LSL 4900 4900 4900 4900 4900 - 4900   Pulsatility - Intermittent pulse Intermittent pulse Intermittent pulse -           Theodore Reagan MD  Cardiac Electrophysiology  [facility] - Cardiology Stepdown

## 2022-01-15 NOTE — NURSING
"Pt's LVAD dressing changed with soap and water under sterile technique at 0015. RN came back in room at 0153, pt's dressing had already come off. Pt denied pulling it off stating "it just comes off". Pt educated on the importance of keeping his dressing on and not touching it or scratching if possible, pt states understanding. WCTM.   "

## 2022-01-15 NOTE — SUBJECTIVE & OBJECTIVE
Interval History: no acute events overnight. WBC remains elevated. INR 1.6.     ROS otherwise negative  Objective:     Vital Signs (Most Recent):  Temp: 99.2 °F (37.3 °C) (01/15/22 0759)  Pulse: 107 (01/15/22 0759)  Resp: 18 (01/15/22 0759)  BP: (!) 70/0 (01/15/22 0858)  SpO2: 96 % (01/15/22 0759) Vital Signs (24h Range):  Temp:  [98.5 °F (36.9 °C)-100.2 °F (37.9 °C)] 99.2 °F (37.3 °C)  Pulse:  [] 107  Resp:  [16-20] 18  SpO2:  [93 %-100 %] 96 %  BP: ()/(0-88) 70/0     Weight: 95.6 kg (210 lb 12.2 oz)  Body mass index is 33.01 kg/m².     SpO2: 96 %  O2 Device (Oxygen Therapy): room air    Physical Exam   General: NAD. AAO  HENT: EOMI  Neck: supple. No JVD  CV: VAD hum  Resp: CTAB. No increased work of breathing  Ext: warm. No edema.      Significant Labs: All pertinent lab results from the last 24 hours have been reviewed.    Significant Imaging: Reviewed

## 2022-01-15 NOTE — PLAN OF CARE
Plan of care discussed with patient.  LVAD DP and numbers WNL, smooth LVAD hum. Patient has no complaints of pain. IV antibiotics maintained. Patient has no questions at this time. Will continue to monitor.

## 2022-01-15 NOTE — PLAN OF CARE
.Plan of care discussed with patient.  Patient ambulating with assistance, fall precautions in place. LVAD DP and numbers WNL, smooth LVAD hum. Patient has no complaints of pain. Discussed medications and care. Patient has no questions at this time. Will continue to monitor.  LVAD DLES had scant bleeding when dressed at 0115, pt reports no tenderness, WCTM.   LVAD DLES dressed again at 0700, no bleeding shown at that time. [facility] MD notified this AM of pt's complaint of intermittent SOB overnight and a frequent cough, O2 sats stable, orders placed. WCTM.

## 2022-01-16 PROBLEM — U07.1 COVID-19: Status: ACTIVE | Noted: 2022-01-01

## 2022-01-16 NOTE — ASSESSMENT & PLAN NOTE
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 INR this AM 1.4 (got Vit K 1 mg IV on 1/6 for INR 3.0)  -LDH stable  -ECHO 1/11 at 5300 rpm: LVEDD 6.60, RV not well visualized, interval degeneration of MV, suspect partial flail posterior leaflet, cannot exclude significant MR, mild TR, IVC 15, BRYAN intermittently and septum midline  -Gave Lasix 40 mg IVP yesterday and resumed Lisinopril 5 mg qd given worsening MR. I&O's are not accurate today. As he is eating and drinking very little, with diurese only prn      Procedure: Device Interrogation Including analysis of device parameters  Current Settings: Ventricular Assist Device  Review of device function is stable      TXP LVAD INTERROGATIONS 1/16/2022 1/16/2022 1/16/2022 1/15/2022 1/15/2022 1/15/2022 1/15/2022   Type HeartMate3 HeartMate3 HeartMate3 HeartMate3 HeartMate3 HeartMate3 HeartMate3   Flow 4.8 4.9 4.8 5.1 4.9 5.0 4.9   Speed 5300 5300 5350 5300 5300 5300 5300   PI 2.5 3.0 3.1 2.7 2.6 2.8 2.8   Power (Centeno) 3.8 3.8 3.8 3.8 3.8 3.8 3.8   LSL - 4900 4900 4900 4900 - -   Pulsatility - No Pulse - - - - -

## 2022-01-16 NOTE — PLAN OF CARE
Plan of care discussed with patient.  Patient ambulating with assistance, fall precautions in place. LVAD DP and numbers WNL, smooth LVAD hum. Patient has no complaints of pain. Discussed medications and care. Patient has no questions at this time. Will continue to monitor.  LVAD DLES dressed with soap and water, anti-inflammatory cream applied, benadryl given, pt's dressing was off by 0300this AM. Pt educated on importance of keeping dressing on to prevent infection and prevent damage to DLES.

## 2022-01-16 NOTE — PLAN OF CARE
Plan of care discussed with patient. Patient is free of fall/trauma/injury. Denies CP, SOB, or pain/discomfort.  LVAD DP and numbers WNL, smooth LVAD hum. Mg =1.7 today, replaced. All questions addressed. Will continue to monitor

## 2022-01-16 NOTE — PROGRESS NOTES
[facility] - Cardiology Stepdown  Heart Transplant  Progress Note    Patient Name: Saba Lott  MRN: [id number]  Admission Date: 12/31/2021  Hospital Length of Stay: 16 days  Attending Physician: Santosh Avalos MD  Primary Care Provider: Mary Lombardi MD  Principal Problem:Fever    Subjective:     Interval History: COVID-19 positive, no complaints, intermittent cough, breathing comfortably on     Continuous Infusions:  Scheduled Meds:   atorvastatin  20 mg Oral QHS    DAPTOmycin (CUBICIN)  IV  10 mg/kg Intravenous Q24H    docusate sodium  100 mg Oral Daily    Lactobacillus rhamnosus GG  1 capsule Oral Daily    lisinopriL  5 mg Oral Daily    magnesium oxide  800 mg Oral TID    piperacillin-tazobactam (ZOSYN) IVPB  4.5 g Intravenous Q8H    triamcinolone acetonide 0.025%   Topical (Top) BID    warfarin  4 mg Oral Daily     PRN Meds:acetaminophen, albuterol-ipratropium, aluminum & magnesium hydroxide-simethicone, benzonatate, diphenhydrAMINE, guaiFENesin 100 mg/5 ml, ondansetron    Review of patient's allergies indicates:   Allergen Reactions    Iodine and iodide containing products      Objective:     Vital Signs (Most Recent):  Temp: 98.7 °F (37.1 °C) (01/16/22 0952)  Pulse: (!) 114 (01/16/22 0952)  Resp: 18 (01/16/22 0952)  BP: (!) 68/0 (01/16/22 0952)  SpO2: 97 % (01/16/22 0952) Vital Signs (24h Range):  Temp:  [98.6 °F (37 °C)-99.1 °F (37.3 °C)] 98.7 °F (37.1 °C)  Pulse:  [107-117] 114  Resp:  [16-18] 18  SpO2:  [96 %-97 %] 97 %  BP: (68-82)/(0) 68/0     Patient Vitals for the past 72 hrs (Last 3 readings):   Weight   01/15/22 0759 95.6 kg (210 lb 12.2 oz)     Body mass index is 33.01 kg/m².      Intake/Output Summary (Last 24 hours) at 1/16/2022 1024  Last data filed at 1/16/2022 0900  Gross per 24 hour   Intake 120 ml   Output 100 ml   Net 20 ml       Hemodynamic Parameters:         Physical Exam  Constitutional:       Appearance: He is well-developed and well-nourished.   HENT:      Head:  Normocephalic and atraumatic.   Neck:      Vascular: No JVD.   Cardiovascular:      Comments: LVAD hum  Pulmonary:      Effort: Pulmonary effort is normal. No respiratory distress.      Breath sounds: Normal breath sounds. No wheezing or rales.   Abdominal:      General: Bowel sounds are normal. There is no distension.      Palpations: Abdomen is soft.      Tenderness: There is no abdominal tenderness.   Musculoskeletal:         General: Normal range of motion.   Skin:     General: Skin is warm and dry.      Capillary Refill: Capillary refill takes less than 2 seconds.   Neurological:      Mental Status: He is alert and oriented to person, place, and time.      Cranial Nerves: No cranial nerve deficit.   Psychiatric:         Mood and Affect: Mood and affect normal.         Significant Labs:  CBC:  Recent Labs   Lab 01/14/22  0448 01/15/22  0343 01/16/22  0405   WBC 14.55* 15.37* 16.54*   RBC 3.97* 3.94* 5.42   HGB 7.1* 6.9* 9.6*   HCT 24.5* 24.2* 32.5*   * 526* 417   MCV 62* 61* 60*   MCH 17.9* 17.5* 17.7*   MCHC 29.0* 28.5* 29.5*     BNP:  Recent Labs   Lab 01/10/22  0539 01/12/22  0327 01/14/22 0448   * 637* 488*     CMP:  Recent Labs   Lab 01/10/22  0538 01/11/22  0531 01/12/22  0327 01/13/22  0547 01/14/22  0448 01/15/22  0343 01/16/22  0405      < > 99   < > 90 115* 83   CALCIUM 8.5*   < > 8.1*   < > 8.0* 8.3* 8.5*   ALBUMIN 2.1*  --  2.2*  --  1.9*  --   --    PROT 7.1  --  7.3  --  6.8  --   --    *   < > 133*   < > 131* 132* 135*   K 4.1   < > 3.9   < > 4.0 3.9 4.7   CO2 24   < > 22*   < > 22* 23 18*      < > 103   < > 100 102 106   BUN 6   < > 5*   < > 6 6 6   CREATININE 1.0   < > 0.8   < > 0.8 0.9 0.9   ALKPHOS 70  --  72  --  73  --   --    ALT 19  --  17  --  13  --   --    AST 33  --  23  --  19  --   --    BILITOT 0.6  --  0.6  --  0.6  --   --     < > = values in this interval not displayed.      Coagulation:   Recent Labs   Lab 01/14/22  0448 01/15/22  0343  01/16/22  0405   INR 1.4* 1.6* 1.6*   APTT 32.4* 32.7* 33.0*     LDH:  Recent Labs   Lab 01/14/22  0448 01/15/22  0343 01/16/22  0405   * 418* 436*     Microbiology:  Microbiology Results (last 7 days)     Procedure Component Value Units Date/Time    Blood culture [[id number]] Collected: 01/12/22 1716    Order Status: Completed Specimen: Blood Updated: 01/15/22 2012     Blood Culture, Routine No Growth to date      No Growth to date      No Growth to date      No Growth to date    Narrative:      From 2 different sites 30 minutes apart  Collection has been rescheduled by PAW at 01/12/2022 16:43 Reason:   labs due for 1642  Collection has been rescheduled by PAW at 01/12/2022 16:43 Reason:   labs due for 1642    Blood culture [[id number]] Collected: 01/12/22 1715    Order Status: Completed Specimen: Blood Updated: 01/15/22 2012     Blood Culture, Routine No Growth to date      No Growth to date      No Growth to date      No Growth to date    Narrative:      From 2 different sites 30 minutes apart  Collection has been rescheduled by PAW at 01/12/2022 16:43 Reason:   labs due for 1642  Collection has been rescheduled by PAW at 01/12/2022 16:43 Reason:   labs due for 1642    Clostridium difficile EIA [[id number]] Collected: 01/14/22 1139    Order Status: Completed Specimen: Stool Updated: 01/14/22 2306     C. diff Antigen Negative     C difficile Toxins A+B, EIA Negative     Comment: Testing not recommended for children <24 months old.       Clostridium difficile EIA [[id number]]     Order Status: Canceled Specimen: Stool     Blood culture [[id number]] Collected: 01/07/22 0604    Order Status: Completed Specimen: Blood Updated: 01/12/22 1012     Blood Culture, Routine No growth after 5 days.    Narrative:      From 2 different sites 30 minutes apart    Blood culture [[id number]] Collected: 01/07/22 0604    Order Status: Completed Specimen: Blood Updated: 01/12/22 1012     Blood Culture, Routine No growth after 5 days.     Narrative:      From 2 different sites 30 minutes apart    Culture, Anaerobe [[id number]] Collected: 01/03/22 1822    Order Status: Completed Specimen: Wound from Abdomen Updated: 01/10/22 0850     Anaerobic Culture No anaerobes isolated          I have reviewed all pertinent labs within the past 24 hours.    Estimated Creatinine Clearance: 99.8 mL/min (based on SCr of 0.9 mg/dL).    Diagnostic Results:  I have reviewed and interpreted all pertinent imaging results/findings within the past 24 hours.    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 Patient is short of breath and uncomfortable at the time of giving history. He reports chills, weakness, cough, sputum production, weight gain, nausea, vomiting and trouble breathing. He went to his PCP office yesterday where his temp was 103 and was sent to the hospital afterwards. His white count is 20 on arrival. He is unable to lay flat. His HR is 102-142 sinus. He needs to be diuresed upon arrival but K is 2.9, ordered IV and PO Kcl, he vomited out oral potassium, IV replacement will be done.       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 On admit presented with cough, nausea, vomiting, weakness, and temp of 103 F  - Admits to noncompliance with suppressive Doxy PTA  - Blood cultures 1/1 & 1/2 positive for Staph, repeats -ve and blood cx 1/7 with NGTD  - Abdominal culture 1/3 positive for pseudomonas  - Spiked fever again last night Tmax 101, WCC still elevated at 17k  - ID following, vanc changed to dapto on 1/1 given hx of VISA/VRSA/MRSA. Cefepime changed to jacoby 1/2  and then jacoby changed to Zosyn 1/6 for Pseudomonas from epigastric wound cx done 1/3. Zosyn changed to Cefepime 1/10 by ID. Switching back to Zosyn today. (Current antibiotics = daptomycin + Zosyn).   - ID testing fungitell assay today.   - CT c/a/p on admit with left basilar ground-glass and airspace opacities with additional slight ground-glass attenuation in the left upper lobe.  Abdomen distended and tender on 1/9 - CT c/a/p repeated 1/9 and shows increased soft tissue thickening about the distal superior mesenteric vasculature with apparent vessel enlargement.  Engorged distal small vessel mesenteric vasculature and surrounding edema/stranding.  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  Differential also includes mesenteric panniculitis however felt less likely. Stable postoperative changes of LVAD placement.  Stable soft tissue induration about the drive line catheter within the ventral abdominal wall.  No focal fluid collection or abscess. Per Int Cards, no intervention can be done  - Long h/o PICC line issues. Will consult IR for tunneled PICC line for home IV antibiotics if patient does not elect to go home with Hospice. Blood cultures now clear x 48 hours so could get tunneled PICC today. Patient stated this AM he agrees to home hospice. Will hold off on placing line for now.     COVID-19  -COVID-19 positive 3/15  - currently breathing comfortably room air  - consideration of monoclonal antibody referral  - supportive care      Sepsis  -See fever    Complication involving left ventricular assist device (LVAD)  Procedure: Device Interrogation Including analysis of device parameters  Patient had DLES dressing intact this AM. Reportedly less itchy (getting IV benadryl and dressing changes with soap/water instead of chlorhexidine.)   Current Settings: Ventricular Assist Device  Review of device function is stable/unstable    TXP LVAD INTERROGATIONS 1/14/2022 1/14/2022  1/13/2022 1/13/2022 1/13/2022 1/13/2022 1/13/2022   Type HeartMate3 HeartMate3 HeartMate3 HeartMate3 HeartMate3 HeartMate3 HeartMate3   Flow 4.9 5.0 5.0 4.9 5.0 5.0 4.9   Speed 5300 5300 5300 5300 5300 5300 5300   PI 2.8 3.7 3.8 3.9 2.6 2.8 2.9   Power (Centeno) 3.8 3.8 3.7 3.8 3.9 3.8 3.8   LSL 4900 4900 4900 4900 4900 - 4900   Pulsatility - Intermittent pulse Intermittent pulse Intermittent pulse - - -       MRSA bacteremia  -See fever    LVAD (left ventricular assist device) present  -S/P DT HM3 with MVR 2/2/20  -Current speed 5300  -INR goal 1.5-2.0 (hx of ICH). INR this AM 1.4 (got Vit K 1 mg IV on 1/6 for INR 3.0)  -LDH stable  -ECHO 1/11 at 5300 rpm: LVEDD 6.60, RV not well visualized, interval degeneration of MV, suspect partial flail posterior leaflet, cannot exclude significant MR, mild TR, IVC 15, BRYAN intermittently and septum midline    Procedure: Device Interrogation Including analysis of device parameters  Current Settings: Ventricular Assist Device  Review of device function is stable      TXP LVAD INTERROGATIONS 1/16/2022 1/16/2022 1/16/2022 1/15/2022 1/15/2022 1/15/2022 1/15/2022   Type HeartMate3 HeartMate3 HeartMate3 HeartMate3 HeartMate3 HeartMate3 HeartMate3   Flow 4.8 4.9 4.8 5.1 4.9 5.0 4.9   Speed 5300 5300 5350 5300 5300 5300 5300   PI 2.5 3.0 3.1 2.7 2.6 2.8 2.8   Power (Centeno) 3.8 3.8 3.8 3.8 3.8 3.8 3.8   LSL - 4900 4900 4900 4900 - -   Pulsatility - No Pulse - - - - -           Tommy Cochran MD  Heart Transplant  [facility] - Cardiology Stepdown

## 2022-01-16 NOTE — NURSING
Asim MANCILLA notified of pt being without IV access currently. Will continue to attempt for access. MD stated okay.

## 2022-01-16 NOTE — ACP (ADVANCE CARE PLANNING)
Nurse unable to give scheduled IV meropenem due to loss of PIV noted while trying to start medication.Will try to secure PIV.

## 2022-01-16 NOTE — SUBJECTIVE & OBJECTIVE
Interval History: COVID-19 positive, no complaints, intermittent cough, breathing comfortably on     Continuous Infusions:  Scheduled Meds:   atorvastatin  20 mg Oral QHS    DAPTOmycin (CUBICIN)  IV  10 mg/kg Intravenous Q24H    docusate sodium  100 mg Oral Daily    Lactobacillus rhamnosus GG  1 capsule Oral Daily    lisinopriL  5 mg Oral Daily    magnesium oxide  800 mg Oral TID    piperacillin-tazobactam (ZOSYN) IVPB  4.5 g Intravenous Q8H    triamcinolone acetonide 0.025%   Topical (Top) BID    warfarin  4 mg Oral Daily     PRN Meds:acetaminophen, albuterol-ipratropium, aluminum & magnesium hydroxide-simethicone, benzonatate, diphenhydrAMINE, guaiFENesin 100 mg/5 ml, ondansetron    Review of patient's allergies indicates:   Allergen Reactions    Iodine and iodide containing products      Objective:     Vital Signs (Most Recent):  Temp: 98.7 °F (37.1 °C) (01/16/22 0952)  Pulse: (!) 114 (01/16/22 0952)  Resp: 18 (01/16/22 0952)  BP: (!) 68/0 (01/16/22 0952)  SpO2: 97 % (01/16/22 0952) Vital Signs (24h Range):  Temp:  [98.6 °F (37 °C)-99.1 °F (37.3 °C)] 98.7 °F (37.1 °C)  Pulse:  [107-117] 114  Resp:  [16-18] 18  SpO2:  [96 %-97 %] 97 %  BP: (68-82)/(0) 68/0     Patient Vitals for the past 72 hrs (Last 3 readings):   Weight   01/15/22 0759 95.6 kg (210 lb 12.2 oz)     Body mass index is 33.01 kg/m².      Intake/Output Summary (Last 24 hours) at 1/16/2022 1024  Last data filed at 1/16/2022 0900  Gross per 24 hour   Intake 120 ml   Output 100 ml   Net 20 ml       Hemodynamic Parameters:         Physical Exam  Constitutional:       Appearance: He is well-developed and well-nourished.   HENT:      Head: Normocephalic and atraumatic.   Neck:      Vascular: No JVD.   Cardiovascular:      Comments: LVAD hum  Pulmonary:      Effort: Pulmonary effort is normal. No respiratory distress.      Breath sounds: Normal breath sounds. No wheezing or rales.   Abdominal:      General: Bowel sounds are normal. There is no  distension.      Palpations: Abdomen is soft.      Tenderness: There is no abdominal tenderness.   Musculoskeletal:         General: Normal range of motion.   Skin:     General: Skin is warm and dry.      Capillary Refill: Capillary refill takes less than 2 seconds.   Neurological:      Mental Status: He is alert and oriented to person, place, and time.      Cranial Nerves: No cranial nerve deficit.   Psychiatric:         Mood and Affect: Mood and affect normal.         Significant Labs:  CBC:  Recent Labs   Lab 01/14/22 0448 01/15/22  0343 01/16/22  0405   WBC 14.55* 15.37* 16.54*   RBC 3.97* 3.94* 5.42   HGB 7.1* 6.9* 9.6*   HCT 24.5* 24.2* 32.5*   * 526* 417   MCV 62* 61* 60*   MCH 17.9* 17.5* 17.7*   MCHC 29.0* 28.5* 29.5*     BNP:  Recent Labs   Lab 01/10/22  0539 01/12/22 0327 01/14/22 0448   * 637* 488*     CMP:  Recent Labs   Lab 01/10/22  0538 01/11/22  0531 01/12/22  0327 01/13/22  0547 01/14/22 0448 01/15/22  0343 01/16/22  0405      < > 99   < > 90 115* 83   CALCIUM 8.5*   < > 8.1*   < > 8.0* 8.3* 8.5*   ALBUMIN 2.1*  --  2.2*  --  1.9*  --   --    PROT 7.1  --  7.3  --  6.8  --   --    *   < > 133*   < > 131* 132* 135*   K 4.1   < > 3.9   < > 4.0 3.9 4.7   CO2 24   < > 22*   < > 22* 23 18*      < > 103   < > 100 102 106   BUN 6   < > 5*   < > 6 6 6   CREATININE 1.0   < > 0.8   < > 0.8 0.9 0.9   ALKPHOS 70  --  72  --  73  --   --    ALT 19  --  17  --  13  --   --    AST 33  --  23  --  19  --   --    BILITOT 0.6  --  0.6  --  0.6  --   --     < > = values in this interval not displayed.      Coagulation:   Recent Labs   Lab 01/14/22  0448 01/15/22  0343 01/16/22  0405   INR 1.4* 1.6* 1.6*   APTT 32.4* 32.7* 33.0*     LDH:  Recent Labs   Lab 01/14/22  0448 01/15/22  0343 01/16/22  0405   * 418* 436*     Microbiology:  Microbiology Results (last 7 days)     Procedure Component Value Units Date/Time    Blood culture [[id number]] Collected: 01/12/22 1716    Order  Status: Completed Specimen: Blood Updated: 01/15/22 2012     Blood Culture, Routine No Growth to date      No Growth to date      No Growth to date      No Growth to date    Narrative:      From 2 different sites 30 minutes apart  Collection has been rescheduled by PAW at 01/12/2022 16:43 Reason:   labs due for 1642  Collection has been rescheduled by PAW at 01/12/2022 16:43 Reason:   labs due for 1642    Blood culture [[id number]] Collected: 01/12/22 1715    Order Status: Completed Specimen: Blood Updated: 01/15/22 2012     Blood Culture, Routine No Growth to date      No Growth to date      No Growth to date      No Growth to date    Narrative:      From 2 different sites 30 minutes apart  Collection has been rescheduled by PAW at 01/12/2022 16:43 Reason:   labs due for 1642  Collection has been rescheduled by PAW at 01/12/2022 16:43 Reason:   labs due for 1642    Clostridium difficile EIA [[id number]] Collected: 01/14/22 1139    Order Status: Completed Specimen: Stool Updated: 01/14/22 2306     C. diff Antigen Negative     C difficile Toxins A+B, EIA Negative     Comment: Testing not recommended for children <24 months old.       Clostridium difficile EIA [[id number]]     Order Status: Canceled Specimen: Stool     Blood culture [[id number]] Collected: 01/07/22 0604    Order Status: Completed Specimen: Blood Updated: 01/12/22 1012     Blood Culture, Routine No growth after 5 days.    Narrative:      From 2 different sites 30 minutes apart    Blood culture [[id number]] Collected: 01/07/22 0604    Order Status: Completed Specimen: Blood Updated: 01/12/22 1012     Blood Culture, Routine No growth after 5 days.    Narrative:      From 2 different sites 30 minutes apart    Culture, Anaerobe [[id number]] Collected: 01/03/22 1822    Order Status: Completed Specimen: Wound from Abdomen Updated: 01/10/22 0850     Anaerobic Culture No anaerobes isolated          I have reviewed all pertinent labs within the past 24  hours.    Estimated Creatinine Clearance: 99.8 mL/min (based on SCr of 0.9 mg/dL).    Diagnostic Results:  I have reviewed and interpreted all pertinent imaging results/findings within the past 24 hours.

## 2022-01-16 NOTE — ASSESSMENT & PLAN NOTE
-COVID-19 positive 3/15  - currently breathing comfortably room air  - consideration of monoclonal antibody referral  - supportive care

## 2022-01-17 NOTE — SIGNIFICANT EVENT
Pain in the dorsum of left foot. Mild edema, and significant tenderness. Dopplerable pulses of DP/PT on left. Possibly gout, but not classic presentation. Concern for infectious etiology including osteomyelitis. Will get XR foot to assess further. Pain control.     Skyler Reagan MD  Cardiology  PGY-6  Pager: [phone]    ==============================  UPDATE  XR foot with no acute findings. There is retained hardware however his pain is not localized to this region.

## 2022-01-17 NOTE — PROGRESS NOTES
01/17/2022  Abelardo Sepulveda    Current provider:  Santosh Avalos MD    TXP LVAD INTERROGATIONS 1/17/2022 1/17/2022 1/17/2022 1/17/2022 1/16/2022 1/16/2022 1/16/2022   Type HeartMate3 HeartMate3 HeartMate3 HeartMate3 HeartMate3 HeartMate3 HeartMate3   Flow 4.8 4.6 4.8 2.8 4.8 4.7 4.8   Speed 5300 5300 5300 5300 5300 5300 5300   PI 3.0 4.2 3.5 3.1 2.8 3.5 2.7   Power (Centeno) 3.8 3.8 3.8 3.8 3.8 3.8 3.8   LSL 4900 4900 4900 4900 4900 - -   Pulsatility No Pulse - - - - - -          Rounded on Saba A Oren to ensure all mechanical assist device settings (IABP or VAD) were appropriate and all parameters were within limits.  I was able to ensure all back up equipment was present, the staff had no issues, and the Perfusion Department daily rounding was complete.    In emergency, the nursing units have been notified to contact the perfusion department either by:  Calling [phone] from 630am to 4pm Mon thru Fri, or by contacting the hospital  from 4pm to 630am and on weekends and asking to speak with the perfusionist on call.    2:34 PM

## 2022-01-17 NOTE — PLAN OF CARE
Recommendations     1. Continue Regular diet, fluid per MD      2. Modify supplement to Optisource (vanilla) - indicated for pt's on Coumadin as it does not contain Vit K     3. Consider d/c'ing laxative - pt with continued diarrhea.      4. RD to monitor and follow     Goals: Pt to receive ONS by RD follow-up  Nutrition Goal Status: goal met  Communication of RD Recs: other (comment) (POC)

## 2022-01-17 NOTE — PLAN OF CARE
Plan of care discussed with patient.  Patient up to bedside commode with assistance, fall precautions in place. LVAD DP and numbers WNL, smooth LVAD hum. Patient has complaints of pain on the top of his left foot, HTS notified and came to evaluate, xray ordered. LVAD dressing change completed using sterile technique with soap and water. DLES is a 1 with no drainage noted. Wound care also performed on abdominal ulcer per orders. Tolerated without any complication. Discussed medications and care.  Patient has no questions at this time. Will continue to monitor.

## 2022-01-17 NOTE — SUBJECTIVE & OBJECTIVE
Interval History: no acute events overnight. Plans for tunneled PICC tomorrow, however, patient is COVID positive. He is open to home hospice.     Continuous Infusions:  Scheduled Meds:   atorvastatin  20 mg Oral QHS    DAPTOmycin (CUBICIN)  IV  10 mg/kg Intravenous Q24H    docusate sodium  100 mg Oral Daily    Lactobacillus rhamnosus GG  1 capsule Oral Daily    LIDOcaine  1 patch Transdermal Q24H    lisinopriL  5 mg Oral Daily    magnesium oxide  800 mg Oral TID    piperacillin-tazobactam (ZOSYN) IVPB  4.5 g Intravenous Q8H    triamcinolone acetonide 0.025%   Topical (Top) BID     PRN Meds:acetaminophen, albuterol-ipratropium, aluminum & magnesium hydroxide-simethicone, benzonatate, diphenhydrAMINE, guaiFENesin 100 mg/5 ml, ondansetron    Review of patient's allergies indicates:   Allergen Reactions    Iodine and iodide containing products      Objective:     Vital Signs (Most Recent):  Temp: 98.5 °F (36.9 °C) (01/17/22 0757)  Pulse: (!) 111 (01/17/22 1102)  Resp: 20 (01/17/22 0757)  BP: (!) 70/0 (01/17/22 0757)  SpO2: 95 % (01/17/22 0757) Vital Signs (24h Range):  Temp:  [98 °F (36.7 °C)-98.5 °F (36.9 °C)] 98.5 °F (36.9 °C)  Pulse:  [100-117] 111  Resp:  [14-20] 20  SpO2:  [95 %-97 %] 95 %  BP: (64-80)/(0) 70/0     Patient Vitals for the past 72 hrs (Last 3 readings):   Weight   01/15/22 0759 95.6 kg (210 lb 12.2 oz)     Body mass index is 33.01 kg/m².      Intake/Output Summary (Last 24 hours) at 1/17/2022 1107  Last data filed at 1/17/2022 0706  Gross per 24 hour   Intake 240 ml   Output 250 ml   Net -10 ml     Physical Exam   General: NAD. AAO  HENT: EOMI  Neck: supple. No JVD  CV: VAD hum  Resp: No increased work of breathing  Ext: warm. No edema.      Significant Labs:  CBC:  Recent Labs   Lab 01/15/22  0343 01/16/22  0405 01/17/22  0543   WBC 15.37* 16.54* 15.29*   RBC 3.94* 5.42 3.79*   HGB 6.9* 9.6* 7.0*   HCT 24.2* 32.5* 24.5*   * 417 576*   MCV 61* 60* 65*   MCH 17.5* 17.7* 18.5*   MCHC  28.5* 29.5* 28.6*     BNP:  Recent Labs   Lab 01/12/22 0327 01/14/22 0448 01/17/22  0543   * 488* 369*     CMP:  Recent Labs   Lab 01/12/22 0327 01/13/22  0547 01/14/22 0448 01/14/22 0448 01/15/22  0343 01/16/22  0405 01/17/22  0543   GLU 99   < > 90   < > 115* 83 73   CALCIUM 8.1*   < > 8.0*   < > 8.3* 8.5* 8.5*   ALBUMIN 2.2*  --  1.9*  --   --   --  2.0*   PROT 7.3  --  6.8  --   --   --  7.1   *   < > 131*   < > 132* 135* 136   K 3.9   < > 4.0   < > 3.9 4.7 3.9   CO2 22*   < > 22*   < > 23 18* 22*      < > 100   < > 102 106 104   BUN 5*   < > 6   < > 6 6 5*   CREATININE 0.8   < > 0.8   < > 0.9 0.9 0.8   ALKPHOS 72  --  73  --   --   --  80   ALT 17  --  13  --   --   --  12   AST 23  --  19  --   --   --  19   BILITOT 0.6  --  0.6  --   --   --  0.6    < > = values in this interval not displayed.      Coagulation:   Recent Labs   Lab 01/15/22  0343 01/16/22  0405 01/17/22  0543   INR 1.6* 1.6* 2.5*   APTT 32.7* 33.0* 35.3*     LDH:  Recent Labs   Lab 01/15/22  0343 01/16/22  0405 01/17/22  0543   * 436* 318*     Microbiology:  Microbiology Results (last 7 days)     Procedure Component Value Units Date/Time    Blood culture [[id number]] Collected: 01/12/22 1716    Order Status: Completed Specimen: Blood Updated: 01/16/22 2012     Blood Culture, Routine No Growth to date      No Growth to date      No Growth to date      No Growth to date      No Growth to date    Narrative:      From 2 different sites 30 minutes apart  Collection has been rescheduled by PAW at 01/12/2022 16:43 Reason:   labs due for 1642  Collection has been rescheduled by PAW at 01/12/2022 16:43 Reason:   labs due for 1642    Blood culture [[id number]] Collected: 01/12/22 1715    Order Status: Completed Specimen: Blood Updated: 01/16/22 2012     Blood Culture, Routine No Growth to date      No Growth to date      No Growth to date      No Growth to date      No Growth to date    Narrative:      From 2 different sites  30 minutes apart  Collection has been rescheduled by PAW at 01/12/2022 16:43 Reason:   labs due for 1642  Collection has been rescheduled by PAW at 01/12/2022 16:43 Reason:   labs due for 1642    Clostridium difficile EIA [[id number]] Collected: 01/14/22 1139    Order Status: Completed Specimen: Stool Updated: 01/14/22 2306     C. diff Antigen Negative     C difficile Toxins A+B, EIA Negative     Comment: Testing not recommended for children <24 months old.       Clostridium difficile EIA [[id number]]     Order Status: Canceled Specimen: Stool     Blood culture [[id number]] Collected: 01/07/22 0604    Order Status: Completed Specimen: Blood Updated: 01/12/22 1012     Blood Culture, Routine No growth after 5 days.    Narrative:      From 2 different sites 30 minutes apart    Blood culture [[id number]] Collected: 01/07/22 0604    Order Status: Completed Specimen: Blood Updated: 01/12/22 1012     Blood Culture, Routine No growth after 5 days.    Narrative:      From 2 different sites 30 minutes apart          I have reviewed all pertinent labs within the past 24 hours.    Estimated Creatinine Clearance: 112.3 mL/min (based on SCr of 0.8 mg/dL).    Diagnostic Results:  I have reviewed and interpreted all pertinent imaging results/findings within the past 24 hours.

## 2022-01-17 NOTE — NURSING
Pt complaining of continuous pain in top of L foot that's worse with weight or touch applied. Pulses still  1+, pt has no complaints of numbness or tinglness. Foot is warm without discolortation, cap refill less than 3 secs. Asim MANCILLA notified, will try Tylenol and heat and CTM per MD's okay.

## 2022-01-17 NOTE — PROCEDURES
"Saba Lott is a 56 y.o. male patient.    Temp: 98.1 °F (36.7 °C) (01/17/22 1126)  Pulse: (!) 113 (01/17/22 1126)  Resp: 20 (01/17/22 1126)  BP: (!) 68/0 (01/17/22 1126)  SpO2: 96 % (01/17/22 1126)  Weight: 95.6 kg (210 lb 12.2 oz) (01/17/22 1200)  Height: 5' 7.01" (170.2 cm) (01/17/22 1200)    Procedures    TXP LVAD INTERROGATIONS 1/17/2022 1/17/2022 1/17/2022 1/17/2022 1/16/2022 1/16/2022 1/16/2022   Type HeartMate3 HeartMate3 HeartMate3 HeartMate3 HeartMate3 HeartMate3 HeartMate3   Flow 4.8 4.6 4.8 2.8 4.8 4.7 4.8   Speed 5300 5300 5300 5300 5300 5300 5300   PI 3.0 4.2 3.5 3.1 2.8 3.5 2.7   Power (Centeno) 3.8 3.8 3.8 3.8 3.8 3.8 3.8   LSL 4900 4900 4900 4900 4900 - -   Pulsatility No Pulse - - - - - -   Interrogation of Ventricular assist device was performed with physician analysis of device parameters and review of device function. I have personally reviewed the interrogation findings and agree with findings as stated.     1/17/2022  "

## 2022-01-17 NOTE — PROGRESS NOTES
"[facility]  Adult Nutrition  Progress Note    SUMMARY       Recommendations    1. Continue Regular diet, fluid per MD     2. Modify supplement to Optisource (vanilla) - indicated for pt's on Coumadin as it does not contain Vit K    3. Consider d/c'ing laxative - pt with continued diarrhea.     4. RD to monitor and follow    Goals: Pt to receive ONS by RD follow-up  Nutrition Goal Status: goal met  Communication of RD Recs: other (comment) (POC)    Assessment and Plan    Nutrition Problem  Inadequate energy intake            Related to (etiology):   Decreased appetite     Signs and Symptoms (as evidenced by):   Decreased PO intake of 25%-50% of meals     Interventions/Recommendations (treatment strategy):  Collaboration of nutrition care with other providers     Nutrition Diagnosis Status:   Continues    Reason for Assessment    Reason For Assessment: RD follow-up  Diagnosis:  (fever)  Relevant Medical History: LVAD, sepsis, alcohol abuse, stroke  Interdisciplinary Rounds: did not attend  General Information Comments: Pt with continued poor appetite, consuming 25-50% of meals. Wt gain of 14 lbs x 3 days, likely fluid-related. No N/V/C; still having diarrhea per NP note. Boost ONS ordered - unsure if drinking. NFPE not warranted - pt appears nourished.   Nutrition Discharge Planning: Adequate intake    Nutrition Risk Screen    Nutrition Risk Screen: no indicators present    Nutrition/Diet History    Patient Reported Diet/Restrictions/Preferences: general  Spiritual, Cultural Beliefs, Judaism Practices, Values that Affect Care: no  Food Allergies: NKFA  Factors Affecting Nutritional Intake: decreased appetite    Anthropometrics    Temp: 98.1 °F (36.7 °C)  Height Method: Stated  Height: 5' 7.01" (170.2 cm)  Height (inches): 67.01 in  Weight Method: Bed Scale  Weight: 95.6 kg (210 lb 12.2 oz)  Weight (lb): 210.76 lb  Ideal Body Weight (IBW), Male: 148.06 lb  % Ideal Body Weight, Male (lb): 142.35 " %  BMI (Calculated): 33  Usual Body Weight (UBW), k.7 kg  % Usual Body Weight: 98.11  % Weight Change From Usual Weight: -2.1 %       Lab/Procedures/Meds    Pertinent Labs Reviewed: reviewed  Pertinent Labs Comments: BUN 5, Ca 8.5, Alb 2.0, Prealb 6, CRP 85.8,   Pertinent Medications Reviewed: reviewed  Pertinent Medications Comments: statin, docusate sodium, warfarin    Estimated/Assessed Needs    Weight Used For Calorie Calculations: 95.6 kg (210 lb 12.2 oz)  Energy Calorie Requirements (kcal): 1739  Energy Need Method: [facility]  Protein Requirements: 95 - 115 (1.0 - 1.2 g/kg)  Weight Used For Protein Calculations: 95.6 kg (210 lb 12.2 oz)  Fluid Requirements (mL): 1 mL/kcal or per MD  Estimated Fluid Requirement Method: RDA Method  RDA Method (mL): 1739     Nutrition Prescription Ordered    Current Diet Order: Regular  Oral Nutrition Supplement: Boost TID    Evaluation of Received Nutrient/Fluid Intake    I/O: +1416 mL since admit  Energy Calories Required: not meeting needs  Protein Required: not meeting needs  Fluid Required: not meeting needs  Comments: LBM   Tolerance: tolerating  % Intake of Estimated Energy Needs: 25 - 50 %  % Meal Intake: 25 - 50 %    Nutrition Risk    Level of Risk/Frequency of Follow-up:  (1x/week)     Monitor and Evaluation    Food and Nutrient Intake: energy intake,food and beverage intake  Food and Nutrient Adminstration: diet order  Knowledge/Beliefs/Attitudes: food and nutrition knowledge/skill  Physical Activity and Function: nutrition-related ADLs and IADLs  Anthropometric Measurements: weight,weight change,body mass index  Biochemical Data, Medical Tests and Procedures: electrolyte and renal panel,gastrointestinal profile,glucose/endocrine profile,inflammatory profile,lipid profile  Nutrition-Focused Physical Findings: overall appearance     Nutrition Follow-Up    RD Follow-up?: Yes

## 2022-01-17 NOTE — PROGRESS NOTES
[facility]  Heart Transplant  Progress Note    Patient Name: Saba Lott  MRN: [id number]  Admission Date: 12/31/2021  Hospital Length of Stay: 17 days  Attending Physician: Santosh Avalos MD  Primary Care Provider: Mary Lombardi MD  Principal Problem:Fever    Subjective:     Interval History: no acute events overnight. Plans for tunneled PICC tomorrow, however, patient is COVID positive. He is open to home hospice.     Continuous Infusions:  Scheduled Meds:   atorvastatin  20 mg Oral QHS    DAPTOmycin (CUBICIN)  IV  10 mg/kg Intravenous Q24H    docusate sodium  100 mg Oral Daily    Lactobacillus rhamnosus GG  1 capsule Oral Daily    LIDOcaine  1 patch Transdermal Q24H    lisinopriL  5 mg Oral Daily    magnesium oxide  800 mg Oral TID    piperacillin-tazobactam (ZOSYN) IVPB  4.5 g Intravenous Q8H    triamcinolone acetonide 0.025%   Topical (Top) BID     PRN Meds:acetaminophen, albuterol-ipratropium, aluminum & magnesium hydroxide-simethicone, benzonatate, diphenhydrAMINE, guaiFENesin 100 mg/5 ml, ondansetron    Review of patient's allergies indicates:   Allergen Reactions    Iodine and iodide containing products      Objective:     Vital Signs (Most Recent):  Temp: 98.5 °F (36.9 °C) (01/17/22 0757)  Pulse: (!) 111 (01/17/22 1102)  Resp: 20 (01/17/22 0757)  BP: (!) 70/0 (01/17/22 0757)  SpO2: 95 % (01/17/22 0757) Vital Signs (24h Range):  Temp:  [98 °F (36.7 °C)-98.5 °F (36.9 °C)] 98.5 °F (36.9 °C)  Pulse:  [100-117] 111  Resp:  [14-20] 20  SpO2:  [95 %-97 %] 95 %  BP: (64-80)/(0) 70/0     Patient Vitals for the past 72 hrs (Last 3 readings):   Weight   01/15/22 0759 95.6 kg (210 lb 12.2 oz)     Body mass index is 33.01 kg/m².      Intake/Output Summary (Last 24 hours) at 1/17/2022 1107  Last data filed at 1/17/2022 0706  Gross per 24 hour   Intake 240 ml   Output 250 ml   Net -10 ml     Physical Exam   General: NAD. AAO  HENT: EOMI  Neck: supple. No JVD  CV: VAD hum  Resp:  No increased work of breathing  Ext: warm. No edema.      Significant Labs:  CBC:  Recent Labs   Lab 01/15/22  0343 01/16/22  0405 01/17/22  0543   WBC 15.37* 16.54* 15.29*   RBC 3.94* 5.42 3.79*   HGB 6.9* 9.6* 7.0*   HCT 24.2* 32.5* 24.5*   * 417 576*   MCV 61* 60* 65*   MCH 17.5* 17.7* 18.5*   MCHC 28.5* 29.5* 28.6*     BNP:  Recent Labs   Lab 01/12/22 0327 01/14/22 0448 01/17/22  0543   * 488* 369*     CMP:  Recent Labs   Lab 01/12/22 0327 01/13/22  0547 01/14/22  0448 01/14/22  0448 01/15/22  0343 01/16/22  0405 01/17/22  0543   GLU 99   < > 90   < > 115* 83 73   CALCIUM 8.1*   < > 8.0*   < > 8.3* 8.5* 8.5*   ALBUMIN 2.2*  --  1.9*  --   --   --  2.0*   PROT 7.3  --  6.8  --   --   --  7.1   *   < > 131*   < > 132* 135* 136   K 3.9   < > 4.0   < > 3.9 4.7 3.9   CO2 22*   < > 22*   < > 23 18* 22*      < > 100   < > 102 106 104   BUN 5*   < > 6   < > 6 6 5*   CREATININE 0.8   < > 0.8   < > 0.9 0.9 0.8   ALKPHOS 72  --  73  --   --   --  80   ALT 17  --  13  --   --   --  12   AST 23  --  19  --   --   --  19   BILITOT 0.6  --  0.6  --   --   --  0.6    < > = values in this interval not displayed.      Coagulation:   Recent Labs   Lab 01/15/22  0343 01/16/22  0405 01/17/22  0543   INR 1.6* 1.6* 2.5*   APTT 32.7* 33.0* 35.3*     LDH:  Recent Labs   Lab 01/15/22  0343 01/16/22  0405 01/17/22  0543   * 436* 318*     Microbiology:  Microbiology Results (last 7 days)     Procedure Component Value Units Date/Time    Blood culture [[id number]] Collected: 01/12/22 1716    Order Status: Completed Specimen: Blood Updated: 01/16/22 2012     Blood Culture, Routine No Growth to date      No Growth to date      No Growth to date      No Growth to date      No Growth to date    Narrative:      From 2 different sites 30 minutes apart  Collection has been rescheduled by PAW at 01/12/2022 16:43 Reason:   labs due for 1642  Collection has been rescheduled by PAW at 01/12/2022 16:43 Reason:   labs  due for 1642    Blood culture [[id number]] Collected: 01/12/22 1715    Order Status: Completed Specimen: Blood Updated: 01/16/22 2012     Blood Culture, Routine No Growth to date      No Growth to date      No Growth to date      No Growth to date      No Growth to date    Narrative:      From 2 different sites 30 minutes apart  Collection has been rescheduled by PAW at 01/12/2022 16:43 Reason:   labs due for 1642  Collection has been rescheduled by PAW at 01/12/2022 16:43 Reason:   labs due for 1642    Clostridium difficile EIA [[id number]] Collected: 01/14/22 1139    Order Status: Completed Specimen: Stool Updated: 01/14/22 2306     C. diff Antigen Negative     C difficile Toxins A+B, EIA Negative     Comment: Testing not recommended for children <24 months old.       Clostridium difficile EIA [[id number]]     Order Status: Canceled Specimen: Stool     Blood culture [[id number]] Collected: 01/07/22 0604    Order Status: Completed Specimen: Blood Updated: 01/12/22 1012     Blood Culture, Routine No growth after 5 days.    Narrative:      From 2 different sites 30 minutes apart    Blood culture [[id number]] Collected: 01/07/22 0604    Order Status: Completed Specimen: Blood Updated: 01/12/22 1012     Blood Culture, Routine No growth after 5 days.    Narrative:      From 2 different sites 30 minutes apart          I have reviewed all pertinent labs within the past 24 hours.    Estimated Creatinine Clearance: 112.3 mL/min (based on SCr of 0.8 mg/dL).    Diagnostic Results:  I have reviewed and interpreted all pertinent imaging results/findings within the past 24 hours.    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 Patient is short of breath and uncomfortable at  the time of giving history. He reports chills, weakness, cough, sputum production, weight gain, nausea, vomiting and trouble breathing. He went to his PCP office yesterday where his temp was 103 and was sent to the hospital afterwards. His white count is 20 on arrival. He is unable to lay flat. His HR is 102-142 sinus. He needs to be diuresed upon arrival but K is 2.9, ordered IV and PO Kcl, he vomited out oral potassium, IV replacement will be done.       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 On admit presented with cough, nausea, vomiting, weakness, and temp of 103 F  - Admits to noncompliance with suppressive Doxy PTA  - Blood cultures 1/1 & 1/2 positive for Staph, repeats -ve and blood cx 1/7 with NGTD  - Abdominal culture 1/3 positive for pseudomonas  - Spiked fever again last night Tmax 101, WCC still elevated at 17k  - ID following, vanc changed to dapto on 1/1 given hx of VISA/VRSA/MRSA. Cefepime changed to jacoby 1/2 and then jacoby changed to Zosyn 1/6 for Pseudomonas from epigastric wound cx done 1/3. Zosyn changed to Cefepime 1/10 by ID. Switching back to Zosyn today. (Current antibiotics = daptomycin + Zosyn).   - ID testing fungitell assay today.   - CT c/a/p on admit with left basilar ground-glass and airspace opacities with additional slight ground-glass attenuation in the left upper lobe.  Abdomen distended and tender on 1/9 - CT c/a/p repeated 1/9 and shows increased soft tissue thickening about the distal superior mesenteric vasculature with apparent vessel enlargement.  Engorged distal small vessel mesenteric vasculature and surrounding edema/stranding.  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  Differential also includes mesenteric panniculitis however felt less likely. Stable postoperative changes of LVAD placement.   Stable soft tissue induration about the drive line catheter within the ventral abdominal wall.  No focal fluid collection or abscess. Per Int Cards, no intervention can be done  - Long h/o PICC line issues. IR for tunneled PICC however COVID positive now  - Consult palliative care as patient would like home hospice at this time    COVID-19  -COVID-19 positive 3/15  - currently breathing comfortably room air  - consideration of monoclonal antibody referral  - supportive care    Sepsis  -See fever    Complication involving left ventricular assist device (LVAD)  Procedure: Device Interrogation Including analysis of device parameters  Patient had DLES dressing intact this AM. Reportedly less itchy (getting IV benadryl and dressing changes with soap/water instead of chlorhexidine.)   Current Settings: Ventricular Assist Device  Review of device function is stable/unstable    TXP LVAD INTERROGATIONS 1/14/2022 1/14/2022 1/13/2022 1/13/2022 1/13/2022 1/13/2022 1/13/2022   Type HeartMate3 HeartMate3 HeartMate3 HeartMate3 HeartMate3 HeartMate3 HeartMate3   Flow 4.9 5.0 5.0 4.9 5.0 5.0 4.9   Speed 5300 5300 5300 5300 5300 5300 5300   PI 2.8 3.7 3.8 3.9 2.6 2.8 2.9   Power (Centeno) 3.8 3.8 3.7 3.8 3.9 3.8 3.8   LSL 4900 4900 4900 4900 4900 - 4900   Pulsatility - Intermittent pulse Intermittent pulse Intermittent pulse - - -       MRSA bacteremia  -See fever    LVAD (left ventricular assist device) present  -S/P DT HM3 with MVR 2/2/20  -Current speed 5300  -INR goal 1.5-2.0 (hx of ICH). INR this AM 1.4 (got Vit K 1 mg IV on 1/6 for INR 3.0)  -LDH stable  -ECHO 1/11 at 5300 rpm: LVEDD 6.60, RV not well visualized, interval degeneration of MV, suspect partial flail posterior leaflet, cannot exclude significant MR, mild TR, IVC 15, BRYAN intermittently and septum midline  -Gave Lasix 40 mg IVP yesterday and resumed Lisinopril 5 mg qd given worsening MR. I&O's are not accurate today. As he is eating and drinking very little, with  diurese only prn      Procedure: Device Interrogation Including analysis of device parameters  Current Settings: Ventricular Assist Device  Review of device function is stable      TXP LVAD INTERROGATIONS 1/16/2022 1/16/2022 1/16/2022 1/15/2022 1/15/2022 1/15/2022 1/15/2022   Type HeartMate3 HeartMate3 HeartMate3 HeartMate3 HeartMate3 HeartMate3 HeartMate3   Flow 4.8 4.9 4.8 5.1 4.9 5.0 4.9   Speed 5300 5300 5350 5300 5300 5300 5300   PI 2.5 3.0 3.1 2.7 2.6 2.8 2.8   Power (Centeno) 3.8 3.8 3.8 3.8 3.8 3.8 3.8   LSL - 4900 4900 4900 4900 - -   Pulsatility - No Pulse - - - - -           Theodore Reagan MD  Heart Transplant  [facility]

## 2022-01-17 NOTE — PLAN OF CARE
"Plan of care discussed with patient.  Patient ambulating with assistance, fall precautions in place. LVAD DP and numbers WNL, smooth LVAD hum. Pt continuing to have diarrhea frequently (~3-4 x/12 hours), no complaints of abd pain/discomfort. Pt is still complaining of continuous L foot pain of 6/10 after given Lidocaine patch, pt denied another dose of Tylenol stating "that doesn't help". Discussed medications and care. Patient has no questions at this time. Will continue to monitor.   "

## 2022-01-18 PROBLEM — M79.672 FOOT PAIN, LEFT: Status: ACTIVE | Noted: 2022-01-01

## 2022-01-18 NOTE — ASSESSMENT & PLAN NOTE
Procedure: Device Interrogation Including analysis of device parameters  Patient had DLES dressing intact this AM. Reportedly has been less itchy (getting IV benadryl and dressing changes with soap/water instead of chlorhexidine.)   Current Settings: Ventricular Assist Device  Review of device function is stable/unstable    TXP LVAD INTERROGATIONS 1/18/2022 1/18/2022 1/17/2022 1/17/2022 1/17/2022 1/17/2022 1/17/2022   Type HeartMate3 HeartMate3 HeartMate3 HeartMate3 HeartMate3 HeartMate3 HeartMate3   Flow 4.8 4.8 5.0 4.8 4.8 4.8 4.6   Speed 5300 5300 5300 5300 530 5300 5300   PI 3.3 3.4 2.5 3.1 2.8 3.0 4.2   Power (Centeno) 3.8 3.8 3.9 3.9 3.6 3.8 3.8   LSL 4900 4900 4900 4900 4900 4900 4900   Pulsatility No Pulse No Pulse No Pulse No Pulse - No Pulse -

## 2022-01-18 NOTE — ASSESSMENT & PLAN NOTE
Yesterday developed acute pain in the L midfoot. Very tender to palpation on the dorsum of the L midfoot. Per patient too painful to even put bear weight. Xray -kenneth for acute changes (not concerning for fracture or osteo). Possibly atypical presentation of acute gout flare?  - Treating pain w/ acetaminophen and tramadol  - Treating possible gout flare with colchicine.

## 2022-01-18 NOTE — ASSESSMENT & PLAN NOTE
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 INR this AM 3.0. Will hold coumadin again tonight (got Vit K 1 mg IV on 1/6 for INR 3.0)  -LDH stable  -ECHO 1/11 at 5300 rpm: LVEDD 6.60, RV not well visualized, interval degeneration of MV, suspect partial flail posterior leaflet, cannot exclude significant MR, mild TR, IVC 15, BRYAN intermittently and septum midline  -Diuresing only prn      Procedure: Device Interrogation Including analysis of device parameters  Current Settings: Ventricular Assist Device  Review of device function is stable      TXP LVAD INTERROGATIONS 1/18/2022 1/18/2022 1/17/2022 1/17/2022 1/17/2022 1/17/2022 1/17/2022   Type HeartMate3 HeartMate3 HeartMate3 HeartMate3 HeartMate3 HeartMate3 HeartMate3   Flow 4.8 4.8 5.0 4.8 4.8 4.8 4.6   Speed 5300 5300 5300 5300 530 5300 5300   PI 3.3 3.4 2.5 3.1 2.8 3.0 4.2   Power (Centeno) 3.8 3.8 3.9 3.9 3.6 3.8 3.8   LSL 4900 4900 4900 4900 4900 4900 4900   Pulsatility No Pulse No Pulse No Pulse No Pulse - No Pulse -

## 2022-01-18 NOTE — ASSESSMENT & PLAN NOTE
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 On admit presented with cough, nausea, vomiting, weakness, and temp of 103 F  - Admits to noncompliance with suppressive Doxy PTA  - Blood cultures 1/1 & 1/2 positive for Staph, repeats -ve and blood cx 1/7 with NGTD  - Abdominal culture 1/3 positive for pseudomonas  - Spiked fever again last night Tmax 101, WCC still elevated at 17k  - ID following, vanc changed to dapto on 1/1 given hx of VISA/VRSA/MRSA. Cefepime changed to jacoby 1/2 and then jacoby changed to Zosyn 1/6 for Pseudomonas from epigastric wound cx done 1/3. Zosyn changed to Cefepime 1/10 by ID. Switching back to Zosyn today. (Current antibiotics = daptomycin + Zosyn).   - ID testing fungitell assay today.   - CT c/a/p on admit with left basilar ground-glass and airspace opacities with additional slight ground-glass attenuation in the left upper lobe.  Abdomen distended and tender on 1/9 - CT c/a/p repeated 1/9 and shows increased soft tissue thickening about the distal superior mesenteric vasculature with apparent vessel enlargement.  Engorged distal small vessel mesenteric vasculature and surrounding edema/stranding.  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  Differential also includes mesenteric panniculitis however felt less likely. Stable postoperative changes of LVAD placement.  Stable soft tissue induration about the drive line catheter within the ventral abdominal wall.  No focal fluid collection or abscess. Per Int Cards, no intervention can be done  - Long h/o PICC line issues. Will consult IR for tunneled PICC line for home IV antibiotics if patient does not elect to go home with Hospice. Blood cultures clear x 5d. Cannot get tunnelled picc today due to INR being 3.0. Will hold coumadin again tonight.

## 2022-01-18 NOTE — PROGRESS NOTES
[facility] - Cardiology Stepdown  Heart Transplant  Progress Note    Patient Name: Saba Lott  MRN: [id number]  Admission Date: 12/31/2021  Hospital Length of Stay: 18 days  Attending Physician: Fernandez Gr Jr.,*  Primary Care Provider: Mary Lombardi MD  Principal Problem:Fever    Subjective:     Interval History: no acute events overnight Still experiencing severe acute pain in the L midfoot (dorsum of foot). No evidence of fracture or osteomyelitis on xray. Was to get tunneled PICC today but his INR spiked again to 3.0 despite holding coumadin. Will plan to hold coumadin again tonight. Giving a unit of pRBC for hbg of 6.8. When asked about his thoughts regarding hospice this AM he continues to state that he wants all life-prolonging therapies to continue even if it means more frequent hospilazation so plan most in line with patient's wishes at this time would likely to be to establish care with palliative care in the outpatient/home setting.     Continuous Infusions:  Scheduled Meds:   atorvastatin  20 mg Oral QHS    colchicine  0.6 mg Oral Once    colchicine  1.2 mg Oral Daily    DAPTOmycin (CUBICIN)  IV  10 mg/kg Intravenous Q24H    docusate sodium  100 mg Oral Daily    Lactobacillus rhamnosus GG  1 capsule Oral Daily    LIDOcaine  1 patch Transdermal Q24H    lisinopriL  5 mg Oral Daily    magnesium oxide  800 mg Oral TID    piperacillin-tazobactam (ZOSYN) IVPB  4.5 g Intravenous Q8H    triamcinolone acetonide 0.025%   Topical (Top) BID     PRN Meds:sodium chloride, acetaminophen, albuterol-ipratropium, aluminum & magnesium hydroxide-simethicone, benzonatate, diphenhydrAMINE, guaiFENesin 100 mg/5 ml, ondansetron, traMADoL    Review of patient's allergies indicates:   Allergen Reactions    Iodine and iodide containing products      Objective:     Vital Signs (Most Recent):  Temp: 99.9 °F (37.7 °C) (01/18/22 0812)  Pulse: (!) 112 (01/18/22 0812)  Resp: 20 (01/18/22 1109)  BP: (!) 76/0  (01/18/22 0812)  SpO2: 100 % (01/18/22 0812) Vital Signs (24h Range):  Temp:  [98 °F (36.7 °C)-99.9 °F (37.7 °C)] 99.9 °F (37.7 °C)  Pulse:  [100-118] 112  Resp:  [18-20] 20  SpO2:  [95 %-100 %] 100 %  BP: (68-78)/(0) 76/0     Patient Vitals for the past 72 hrs (Last 3 readings):   Weight   01/17/22 1200 95.6 kg (210 lb 12.2 oz)     Body mass index is 33 kg/m².      Intake/Output Summary (Last 24 hours) at 1/18/2022 1123  Last data filed at 1/18/2022 0400  Gross per 24 hour   Intake 720 ml   Output 100 ml   Net 620 ml     Physical Exam  Constitutional:       General: He is not in acute distress.     Appearance: Normal appearance. He is not ill-appearing.   HENT:      Head: Normocephalic and atraumatic.   Neck:      Vascular: No JVD.      Comments: No JVD.  Cardiovascular:      Rate and Rhythm: Tachycardia present.      Comments: Smooth LVAD hum.   Pulmonary:      Effort: Pulmonary effort is normal. No respiratory distress.      Breath sounds: Normal breath sounds. No wheezing or rales.   Abdominal:      General: Abdomen is flat. There is no distension.      Palpations: Abdomen is soft.      Tenderness: There is no abdominal tenderness.      Comments: DLES dressing clean dry and intact.    Musculoskeletal:      Right lower leg: No edema.      Left lower leg: No edema.      Comments: Dorsum of L midfoot very tender to touch, mildly swollen, some warmth   Skin:     General: Skin is warm and dry.   Neurological:      General: No focal deficit present.      Mental Status: He is alert and oriented to person, place, and time.   Psychiatric:         Mood and Affect: Mood normal.         Behavior: Behavior normal.          Significant Labs:  CBC:  Recent Labs   Lab 01/16/22  0405 01/17/22  0543 01/18/22  0639   WBC 16.54* 15.29* 14.55*   RBC 5.42 3.79* 3.48*   HGB 9.6* 7.0* 6.8*   HCT 32.5* 24.5* 23.1*    576* 604*   MCV 60* 65* 66*   MCH 17.7* 18.5* 19.5*   MCHC 29.5* 28.6* 29.4*     BNP:  Recent Labs   Lab  01/12/22 0327 01/14/22 0448 01/17/22  0543   * 488* 369*     CMP:  Recent Labs   Lab 01/12/22 0327 01/13/22  0547 01/14/22 0448 01/15/22  0343 01/16/22 0405 01/17/22  0543 01/18/22  0639   GLU 99   < > 90   < > 83 73 78   CALCIUM 8.1*   < > 8.0*   < > 8.5* 8.5* 8.3*   ALBUMIN 2.2*  --  1.9*  --   --  2.0*  --    PROT 7.3  --  6.8  --   --  7.1  --    *   < > 131*   < > 135* 136 136   K 3.9   < > 4.0   < > 4.7 3.9 3.9   CO2 22*   < > 22*   < > 18* 22* 24      < > 100   < > 106 104 104   BUN 5*   < > 6   < > 6 5* 5*   CREATININE 0.8   < > 0.8   < > 0.9 0.8 0.8   ALKPHOS 72  --  73  --   --  80  --    ALT 17  --  13  --   --  12  --    AST 23  --  19  --   --  19  --    BILITOT 0.6  --  0.6  --   --  0.6  --     < > = values in this interval not displayed.      Coagulation:   Recent Labs   Lab 01/16/22 0405 01/17/22  0543 01/18/22  0639   INR 1.6* 2.5* 3.0*   APTT 33.0* 35.3* 40.3*     LDH:  Recent Labs   Lab 01/16/22 0405 01/17/22  0543 01/18/22  0639   * 318* 294*     Microbiology:  Microbiology Results (last 7 days)     Procedure Component Value Units Date/Time    Blood culture [[id number]] Collected: 01/12/22 1716    Order Status: Completed Specimen: Blood Updated: 01/17/22 2012     Blood Culture, Routine No growth after 5 days.    Narrative:      From 2 different sites 30 minutes apart  Collection has been rescheduled by PAW at 01/12/2022 16:43 Reason:   labs due for 1642  Collection has been rescheduled by PAW at 01/12/2022 16:43 Reason:   labs due for 1642    Blood culture [[id number]] Collected: 01/12/22 1715    Order Status: Completed Specimen: Blood Updated: 01/17/22 2012     Blood Culture, Routine No growth after 5 days.    Narrative:      From 2 different sites 30 minutes apart  Collection has been rescheduled by PAW at 01/12/2022 16:43 Reason:   labs due for 1642  Collection has been rescheduled by PAW at 01/12/2022 16:43 Reason:   labs due for 1642    Clostridium difficile  EIA [[id number]] Collected: 01/14/22 1139    Order Status: Completed Specimen: Stool Updated: 01/14/22 2306     C. diff Antigen Negative     C difficile Toxins A+B, EIA Negative     Comment: Testing not recommended for children <24 months old.       Clostridium difficile EIA [[id number]]     Order Status: Canceled Specimen: Stool     Blood culture [[id number]] Collected: 01/07/22 0604    Order Status: Completed Specimen: Blood Updated: 01/12/22 1012     Blood Culture, Routine No growth after 5 days.    Narrative:      From 2 different sites 30 minutes apart    Blood culture [[id number]] Collected: 01/07/22 0604    Order Status: Completed Specimen: Blood Updated: 01/12/22 1012     Blood Culture, Routine No growth after 5 days.    Narrative:      From 2 different sites 30 minutes apart          I have reviewed all pertinent labs within the past 24 hours.    Estimated Creatinine Clearance: 112.3 mL/min (based on SCr of 0.8 mg/dL).    Diagnostic Results:  I have reviewed and interpreted all pertinent imaging results/findings within the past 24 hours.    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 Patient is short of breath and uncomfortable at the time of giving history. He reports chills, weakness, cough, sputum production, weight gain, nausea, vomiting and trouble breathing. He went to his PCP office yesterday where his temp was 103 and was sent to the hospital afterwards. His white count is 20 on arrival. He is unable to lay flat. His HR is 102-142 sinus. He needs to be diuresed upon arrival but K is 2.9, ordered IV and PO Kcl, he vomited out oral potassium, IV replacement will be done.       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 On admit presented with cough, nausea, vomiting, weakness, and temp of 103 F  - Admits to noncompliance with suppressive Doxy PTA  - Blood cultures 1/1 & 1/2 positive for Staph, repeats -ve and blood cx 1/7 with NGTD  - Abdominal culture 1/3 positive for pseudomonas  - Spiked fever again last night Tmax 101, WCC still elevated at 17k  - ID following, vanc changed to dapto on 1/1 given hx of VISA/VRSA/MRSA. Cefepime changed to jacoby 1/2 and then jacoby changed to Zosyn 1/6 for Pseudomonas from epigastric wound cx done 1/3. Zosyn changed to Cefepime 1/10 by ID. Switching back to Zosyn today. (Current antibiotics = daptomycin + Zosyn).   - ID testing fungitell assay today.   - CT c/a/p on admit with left basilar ground-glass and airspace opacities with additional slight ground-glass attenuation in the left upper lobe.  Abdomen distended and tender on 1/9 - CT c/a/p repeated 1/9 and shows increased soft tissue thickening about the distal superior mesenteric vasculature with apparent vessel enlargement.  Engorged distal small vessel mesenteric vasculature and surrounding edema/stranding.  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  Differential also includes mesenteric panniculitis however felt less likely. Stable postoperative changes of LVAD placement.  Stable soft tissue induration about the drive line catheter within the ventral abdominal wall.  No focal fluid collection or abscess. Per Int Cards, no intervention can be done  - Long h/o PICC line issues. Will consult IR for tunneled PICC line for home IV antibiotics if patient does not elect to go home with Hospice. Blood cultures clear x 5d. Cannot get tunnelled picc today due to INR being 3.0. Will hold coumadin again tonight.     Foot pain, left  Yesterday developed acute pain in the L midfoot. Very tender to palpation on  the dorsum of the L midfoot. Per patient too painful to even put bear weight. Xray -kenneth for acute changes (not concerning for fracture or osteo). Possibly atypical presentation of acute gout flare?  - Treating pain w/ acetaminophen and tramadol  - Treating possible gout flare with colchicine.     COVID-19  -COVID-19 positive 3/15  - currently breathing comfortably room air  - consideration of monoclonal antibody referral  - supportive care      Sepsis  -See fever    Complication involving left ventricular assist device (LVAD)  Procedure: Device Interrogation Including analysis of device parameters  Patient had DLES dressing intact this AM. Reportedly has been less itchy (getting IV benadryl and dressing changes with soap/water instead of chlorhexidine.)   Current Settings: Ventricular Assist Device  Review of device function is stable/unstable    TXP LVAD INTERROGATIONS 1/18/2022 1/18/2022 1/17/2022 1/17/2022 1/17/2022 1/17/2022 1/17/2022   Type HeartMate3 HeartMate3 HeartMate3 HeartMate3 HeartMate3 HeartMate3 HeartMate3   Flow 4.8 4.8 5.0 4.8 4.8 4.8 4.6   Speed 5300 5300 5300 5300 530 5300 5300   PI 3.3 3.4 2.5 3.1 2.8 3.0 4.2   Power (Centeno) 3.8 3.8 3.9 3.9 3.6 3.8 3.8   LSL 4900 4900 4900 4900 4900 4900 4900   Pulsatility No Pulse No Pulse No Pulse No Pulse - No Pulse -       MRSA bacteremia  -See fever    LVAD (left ventricular assist device) present  -S/P DT HM3 with MVR 2/2/20  -Current speed 5300  -INR goal 1.5-2.0 (hx of ICH). INR this AM 3.0. Will hold coumadin again tonight (got Vit K 1 mg IV on 1/6 for INR 3.0)  -LDH stable  -ECHO 1/11 at 5300 rpm: LVEDD 6.60, RV not well visualized, interval degeneration of MV, suspect partial flail posterior leaflet, cannot exclude significant MR, mild TR, IVC 15, BRYAN intermittently and septum midline  -Diuresing only prn      Procedure: Device Interrogation Including analysis of device parameters  Current Settings: Ventricular Assist Device  Review of device function  is stable      TXP LVAD INTERROGATIONS 1/18/2022 1/18/2022 1/17/2022 1/17/2022 1/17/2022 1/17/2022 1/17/2022   Type HeartMate3 HeartMate3 HeartMate3 HeartMate3 HeartMate3 HeartMate3 HeartMate3   Flow 4.8 4.8 5.0 4.8 4.8 4.8 4.6   Speed 5300 5300 5300 5300 530 5300 5300   PI 3.3 3.4 2.5 3.1 2.8 3.0 4.2   Power (Centeno) 3.8 3.8 3.9 3.9 3.6 3.8 3.8   LSL 4900 4900 4900 4900 4900 4900 4900   Pulsatility No Pulse No Pulse No Pulse No Pulse - No Pulse -           Olivier Melton PA-C  Heart Transplant  [facility] - Cardiology Stepdown

## 2022-01-18 NOTE — PROGRESS NOTES
"UPDATE    CoVid-19 precaution - Transplant SW update conducted with patient by phone. Pt agrees to phone visit today.     By phone, Pt presents as AAO x4, calm, cooperative, and asking and answering questions appropriately. Pt shares being surprised at having been diagnosed with Covid-19 and reports no symptoms, and that he is "feeling fine." Pt shares that he is ready to go home. Pt shares understanding that he will not be able to get tunneled PICC today d/t lab work, and that current plan is for PICC tomorrow (Wednesday). Pt reports coping well at this time and having passed a good weekend. SW advised that this clinician is working on dispo planning for home IV abx. Pt v/u & denies needs or concerns at this time. SW reviewed with Pt how to call SW if needs or questions arise. Pt expresses appreciation for ongoing support. SW providing ongoing psychosocial, counseling, & emotional support, education, resources, assistance, and discharge planning as indicated.  SW to continue to follow.    "

## 2022-01-18 NOTE — PROGRESS NOTES
01/18/22 0330        VAD 02/06/20 1047 Left ventricular assist device HeartMate 3   Placement Date/Time: 02/06/20 1047   Present Prior to Hospital Arrival?: No  Inserted by: MD  VAD Type: Left ventricular assist device  VAD Brand: HeartMate 3   Site Location Abdomen right   Site Assessment Clean;Intact;Dry   Driveline Exit Site 1   Dressing Status Clean;Dry;Intact   Dressing Intervention Sterile dressing change   Performed By RN   Dressing Change Schedule Daily   Dressing Change Due 01/19/22   Driveline [place] in use H-tag   Condition CDI   Date changed 01/18/22   Power Source External DC     LVAD dressing changed by RN using soap and water with sterile technique. DLES is a 1. No swelling or redness noted. Pt tolerated well.
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
## 2022-01-18 NOTE — PROGRESS NOTES
"01/18/2022  Bill Soto Jr    Current provider:  Fernandez Gr Jr.,*    TXP LVAD INTERROGATIONS 1/18/2022 1/18/2022 1/17/2022 1/17/2022 1/17/2022 1/17/2022 1/17/2022   Type HeartMate3 HeartMate3 HeartMate3 HeartMate3 HeartMate3 HeartMate3 HeartMate3   Flow 4.8 4.8 5.0 4.8 4.8 4.8 4.6   Speed 5300 5300 5300 5300 530 5300 5300   PI 3.3 3.4 2.5 3.1 2.8 3.0 4.2   Power (Centeno) 3.8 3.8 3.9 3.9 3.6 3.8 3.8   LSL 4900 4900 4900 4900 4900 4900 4900   Pulsatility No Pulse No Pulse No Pulse No Pulse - No Pulse -          As floor rounding has been requested to be limited during COVID-19 patient quarantine by Infectious Disease and leadership, only "electronic" rounding has been performed on this mechanical assist device patient.  Electronic rounding includes verifying in Epic that current settings and measurements for the device have been documented appropriately and that they are within limits.  Additionally, this rounding verifies that the EQUIPMENT section of the VAD flowsheet is documented in Epic, specifically checking the presence of emergency equipment and controller self test.  Any discrepancies will be related to the care team.    In emergency, the nursing units have been notified to contact the perfusion department either by:  Calling [phone] from 630am to 4pm Mon thru Fri, or by contacting the hospital  from 4pm to 630am and on weekends and asking to speak with the perfusionist on call.    Bill Soto Jr  9:13 AM  "

## 2022-01-18 NOTE — SUBJECTIVE & OBJECTIVE
Interval History: no acute events overnight Still experiencing severe acute pain in the L midfoot (dorsum of foot). No evidence of fracture or osteomyelitis on xray. Was to get tunneled PICC today but his INR spiked again to 3.0 despite holding coumadin. Will plan to hold coumadin again tonight. Giving a unit of pRBC for hbg of 6.8. When asked about his thoughts regarding hospice this AM he continues to state that he wants all life-prolonging therapies to continue even if it means more frequent hospilazation so plan most in line with patient's wishes at this time would likely to be to establish care with palliative care in the outpatient/home setting.     Continuous Infusions:  Scheduled Meds:   atorvastatin  20 mg Oral QHS    colchicine  0.6 mg Oral Once    colchicine  1.2 mg Oral Daily    DAPTOmycin (CUBICIN)  IV  10 mg/kg Intravenous Q24H    docusate sodium  100 mg Oral Daily    Lactobacillus rhamnosus GG  1 capsule Oral Daily    LIDOcaine  1 patch Transdermal Q24H    lisinopriL  5 mg Oral Daily    magnesium oxide  800 mg Oral TID    piperacillin-tazobactam (ZOSYN) IVPB  4.5 g Intravenous Q8H    triamcinolone acetonide 0.025%   Topical (Top) BID     PRN Meds:sodium chloride, acetaminophen, albuterol-ipratropium, aluminum & magnesium hydroxide-simethicone, benzonatate, diphenhydrAMINE, guaiFENesin 100 mg/5 ml, ondansetron, traMADoL    Review of patient's allergies indicates:   Allergen Reactions    Iodine and iodide containing products      Objective:     Vital Signs (Most Recent):  Temp: 99.9 °F (37.7 °C) (01/18/22 0812)  Pulse: (!) 112 (01/18/22 0812)  Resp: 20 (01/18/22 1109)  BP: (!) 76/0 (01/18/22 0812)  SpO2: 100 % (01/18/22 0812) Vital Signs (24h Range):  Temp:  [98 °F (36.7 °C)-99.9 °F (37.7 °C)] 99.9 °F (37.7 °C)  Pulse:  [100-118] 112  Resp:  [18-20] 20  SpO2:  [95 %-100 %] 100 %  BP: (68-78)/(0) 76/0     Patient Vitals for the past 72 hrs (Last 3 readings):   Weight   01/17/22 1200 95.6 kg  (210 lb 12.2 oz)     Body mass index is 33 kg/m².      Intake/Output Summary (Last 24 hours) at 1/18/2022 1123  Last data filed at 1/18/2022 0400  Gross per 24 hour   Intake 720 ml   Output 100 ml   Net 620 ml     Physical Exam  Constitutional:       General: He is not in acute distress.     Appearance: Normal appearance. He is not ill-appearing.   HENT:      Head: Normocephalic and atraumatic.   Neck:      Vascular: No JVD.      Comments: No JVD.  Cardiovascular:      Rate and Rhythm: Tachycardia present.      Comments: Smooth LVAD hum.   Pulmonary:      Effort: Pulmonary effort is normal. No respiratory distress.      Breath sounds: Normal breath sounds. No wheezing or rales.   Abdominal:      General: Abdomen is flat. There is no distension.      Palpations: Abdomen is soft.      Tenderness: There is no abdominal tenderness.      Comments: DLES dressing clean dry and intact.    Musculoskeletal:      Right lower leg: No edema.      Left lower leg: No edema.      Comments: Dorsum of L midfoot very tender to touch, mildly swollen, some warmth   Skin:     General: Skin is warm and dry.   Neurological:      General: No focal deficit present.      Mental Status: He is alert and oriented to person, place, and time.   Psychiatric:         Mood and Affect: Mood normal.         Behavior: Behavior normal.          Significant Labs:  CBC:  Recent Labs   Lab 01/16/22 0405 01/17/22  0543 01/18/22  0639   WBC 16.54* 15.29* 14.55*   RBC 5.42 3.79* 3.48*   HGB 9.6* 7.0* 6.8*   HCT 32.5* 24.5* 23.1*    576* 604*   MCV 60* 65* 66*   MCH 17.7* 18.5* 19.5*   MCHC 29.5* 28.6* 29.4*     BNP:  Recent Labs   Lab 01/12/22  0327 01/14/22  0448 01/17/22  0543   * 488* 369*     CMP:  Recent Labs   Lab 01/12/22  0327 01/13/22  0547 01/14/22  0448 01/15/22  0343 01/16/22  0405 01/17/22  0543 01/18/22  0639   GLU 99   < > 90   < > 83 73 78   CALCIUM 8.1*   < > 8.0*   < > 8.5* 8.5* 8.3*   ALBUMIN 2.2*  --  1.9*  --   --  2.0*  --     PROT 7.3  --  6.8  --   --  7.1  --    *   < > 131*   < > 135* 136 136   K 3.9   < > 4.0   < > 4.7 3.9 3.9   CO2 22*   < > 22*   < > 18* 22* 24      < > 100   < > 106 104 104   BUN 5*   < > 6   < > 6 5* 5*   CREATININE 0.8   < > 0.8   < > 0.9 0.8 0.8   ALKPHOS 72  --  73  --   --  80  --    ALT 17  --  13  --   --  12  --    AST 23  --  19  --   --  19  --    BILITOT 0.6  --  0.6  --   --  0.6  --     < > = values in this interval not displayed.      Coagulation:   Recent Labs   Lab 01/16/22  0405 01/17/22  0543 01/18/22  0639   INR 1.6* 2.5* 3.0*   APTT 33.0* 35.3* 40.3*     LDH:  Recent Labs   Lab 01/16/22  0405 01/17/22  0543 01/18/22  0639   * 318* 294*     Microbiology:  Microbiology Results (last 7 days)     Procedure Component Value Units Date/Time    Blood culture [[id number]] Collected: 01/12/22 1716    Order Status: Completed Specimen: Blood Updated: 01/17/22 2012     Blood Culture, Routine No growth after 5 days.    Narrative:      From 2 different sites 30 minutes apart  Collection has been rescheduled by PAW at 01/12/2022 16:43 Reason:   labs due for 1642  Collection has been rescheduled by PAW at 01/12/2022 16:43 Reason:   labs due for 1642    Blood culture [[id number]] Collected: 01/12/22 1715    Order Status: Completed Specimen: Blood Updated: 01/17/22 2012     Blood Culture, Routine No growth after 5 days.    Narrative:      From 2 different sites 30 minutes apart  Collection has been rescheduled by PAW at 01/12/2022 16:43 Reason:   labs due for 1642  Collection has been rescheduled by PAW at 01/12/2022 16:43 Reason:   labs due for 1642    Clostridium difficile EIA [[id number]] Collected: 01/14/22 1139    Order Status: Completed Specimen: Stool Updated: 01/14/22 5067     C. diff Antigen Negative     C difficile Toxins A+B, EIA Negative     Comment: Testing not recommended for children <24 months old.       Clostridium difficile EIA [[id number]]     Order Status: Canceled Specimen:  Stool     Blood culture [[id number]] Collected: 01/07/22 0604    Order Status: Completed Specimen: Blood Updated: 01/12/22 1012     Blood Culture, Routine No growth after 5 days.    Narrative:      From 2 different sites 30 minutes apart    Blood culture [[id number]] Collected: 01/07/22 0604    Order Status: Completed Specimen: Blood Updated: 01/12/22 1012     Blood Culture, Routine No growth after 5 days.    Narrative:      From 2 different sites 30 minutes apart          I have reviewed all pertinent labs within the past 24 hours.    Estimated Creatinine Clearance: 112.3 mL/min (based on SCr of 0.8 mg/dL).    Diagnostic Results:  I have reviewed and interpreted all pertinent imaging results/findings within the past 24 hours.

## 2022-01-18 NOTE — PLAN OF CARE
[facility]   Heart Transplant/VAD Clinic   [address] [place] [address]   [phone] after hours          [phone] fax     VAD HOME  HEALTH ORDERS      Admit to Home Health    Diagnosis:   Patient Active Problem List   Diagnosis    DCM (dilated cardiomyopathy)    Deep vein thrombosis (DVT) of right lower extremity    History of alcohol abuse    History of tobacco abuse    Chronic combined systolic and diastolic heart failure    Goals of care, counseling/discussion    LVAD (left ventricular assist device) present    Anemia due to acute blood loss    Thrombocytosis    Long term (current) use of anticoagulants    MRSA bacteremia    Infection associated with driveline of left ventricular assist device (LVAD)    Elevated CPK    Infection    Abdominal wall abscess    Nontraumatic cortical hemorrhage of left cerebral hemisphere    Cytotoxic brain edema    Left-sided nontraumatic intracerebral hemorrhage    Brain compression    Sepsis due to methicillin resistant Staphylococcus aureus (MRSA)    Complication involving left ventricular assist device (LVAD)    Hyponatremia    Palliative care encounter    Pain    Bacteremia    Fever    Headache    Acute right arterial ischemic stroke, PCA (posterior cerebral artery)    Bacteremia due to Gram-negative bacteria    S/P PICC central line placement    Sepsis    COVID-19    Foot pain, left       Patient is homebound due to:  NYHA Class IV HF. S/P LVAD placement.     Diet: Low Fat, Low cholesterol, 2Gm Na, Coumadin restrictions.    Acitivities: No Swimming, bathing, vacuuming, contact sports.    Fresh implants= Sternal Precautions    Nursing:   SN to complete comprehensive assessment including routine vital signs. Instruct on disease process and s/s of complications to report to MD. Review/verify medication list sent home with the patient at time of discharge  and instruct patient/caregiver  as needed. Frequency may be adjusted depending on start of care date.    **LVAD driveline exit site dressing change is to be completed per LVAD patient/caregiver only**.    Notify MD if:  SBP > 120 or < 80;   MAP > 80 or < 65;   HR > 120 or < 60;   Temp > 101;   Weight gain >3lbs in 1 day or 5lbs in 1 week.    LABS:  SN to perform labs: Weekly CBC, CMP, CRP, CK.   PT/INR per Coumadin clinic [phone].   Follow up INR date: 1/24/22  No Finger Sticks    Weekly CBC, CMP, CRP and CK (every Monday or Tuesday) with results faxed to quoc GUTIERREZ NP, at fax [phone] (Spectra [id number], office [phone])    HOME INFUSION THERAPY: SN to perform Infusion Therapy/Central Line Care.  Review Central Line Care & Central Line Flush with patient.    Administer (drug and dose):   Daptomycin 10 mg/kg q24 hours    Zosyn 4.5 g IV q 24 hours    End date of both = 2/14/22    Central line care:  Scrub the Hub: Prior to accessing the line, always perform a 30 second alcohol scrub  Each lumen of the central line is to be flushed at least daily with 10 mL Normal Saline and 3 mL Heparin flush (100 units/mL)  Skilled Nurse (SN) may draw blood from IV access  Blood Draw Procedure:   - Aspirate at least 5 mL of blood   - Discard   - Obtain specimen   - Change posiflow cap   - Flush with 20 mL Normal Saline followed by a                 3-5 mL Heparin flush (100 units/mL)  Central :   - Sterile dressing changes are done weekly and as needed.   - Use chlor-hexadine scrub to cleanse site, apply Biopatch to insertion site,       apply securement device dressing   - Posi-flow caps are changed weekly and after EVERY lab draw.   - If sterile gauze is under dressing to control oozing,                 dressing change must be performed every 24 hours until gauze is not needed.    CONSULTS:      Physical Therapy to evaluate and treat. Evaluate for home safety and equipment needs; Establish/upgrade home exercise program.  Perform / instruct on therapeutic exercises, gait training, transfer training, and Range of Motion.      Send initial Home Health orders to [facility] attending physician on call.  Send follow up questions to VAD clinic MD [phone] or [phone].

## 2022-01-18 NOTE — CONSULTS
56 y.o. male who presents with COVID + and LVAD needing tunneled PICC for long-term IV antibiotics.     INR is supratherapeutic today. Will need his INR better controlled prior to procedure. Discussed with CONG Tadeo. May be able to do procedure tomorrow pending INR.    NPO, hold nonvital AC    Will need anesthesia due to LVAD. Patient wants at least fentanyl for procedure.    Valdemar Gary MD  PGY-III, Radiology

## 2022-01-18 NOTE — PLAN OF CARE
Pt free of falls and injury. Pt AAOx4. Fall precautions remain in place. Isolation precautions maintained. Pt remains on room air. Sats >95%. ST on telemetry with LVAD artifact. LVAD hum present and smooth. VAD numbers and dopplers WDL. Educated pt on importance of DLES dressing and keeping it intact. Plan of care reviewed with pt. IV antibiotics administered per MAR. Wound care performed per wound care orders. Pt resting comfortably. Prn benadryl administered for itching and tylenol administered for foot pain. Pt denies chest pain, headache, and SOB. Respirations even and unlabored. Pt VSS, no distress, will continue to monitor.

## 2022-01-18 NOTE — PROGRESS NOTES
Interdisciplinary Rounds Report:   Attended interdisciplinary rounds with the [facility]/CTS services including the LVAD Coordinators, social workers, cardiologists, surgeons,  PT/OT/Speech, dietician, and unit charge nurses. Discussed patient status, plan of care, goals of care, including DC date, and post discharge needs. Plan of care will be discussed with the patient and/or family per the physician while rounding on the floor. This is a recurring meeting that is medically and socially necessary to collaborate with the interdisciplinary team to assist patient needs and safe discharge.

## 2022-01-19 NOTE — SUBJECTIVE & OBJECTIVE
**Interval History: Still has non productive cough, but reports feeling generally well. Appetite remains poor. Having normal BM's. Was to get tunneled cath today by IR, but INR has jumped to 3.6. Gave Vit K 2 mg IV X 1 and will repeat INR ~ noon. Keeping NPO per rec of IR    Continuous Infusions:  Scheduled Meds:   atorvastatin  20 mg Oral QHS    [START ON 1/20/2022] colchicine  0.6 mg Oral Daily    DAPTOmycin (CUBICIN)  IV  10 mg/kg Intravenous Q24H    docusate sodium  100 mg Oral Daily    Lactobacillus rhamnosus GG  1 capsule Oral Daily    LIDOcaine  1 patch Transdermal Q24H    lisinopriL  5 mg Oral Daily    magnesium oxide  800 mg Oral TID    piperacillin-tazobactam (ZOSYN) IVPB  4.5 g Intravenous Q8H    triamcinolone acetonide 0.025%   Topical (Top) BID     PRN Meds:sodium chloride, sodium chloride, acetaminophen, albuterol-ipratropium, aluminum & magnesium hydroxide-simethicone, benzonatate, diphenhydrAMINE, guaiFENesin 100 mg/5 ml, ondansetron, traMADoL    Review of patient's allergies indicates:   Allergen Reactions    Iodine and iodide containing products      Objective:     Vital Signs (Most Recent):  Temp: 99.1 °F (37.3 °C) (01/19/22 1146)  Pulse: 104 (01/19/22 1146)  Resp: 16 (01/19/22 1146)  BP: (!) 76/0 (01/19/22 1146)  SpO2: 97 % (01/19/22 1146) Vital Signs (24h Range):  Temp:  [97.8 °F (36.6 °C)-99.1 °F (37.3 °C)] 99.1 °F (37.3 °C)  Pulse:  [] 104  Resp:  [16-20] 16  SpO2:  [94 %-100 %] 97 %  BP: (70-80)/(0) 76/0     Patient Vitals for the past 72 hrs (Last 3 readings):   Weight   01/18/22 0700 95.6 kg (210 lb 12.2 oz)   01/17/22 1200 95.6 kg (210 lb 12.2 oz)     Body mass index is 33 kg/m².      Intake/Output Summary (Last 24 hours) at 1/19/2022 1207  Last data filed at 1/19/2022 0949  Gross per 24 hour   Intake 831.25 ml   Output 250 ml   Net 581.25 ml       Hemodynamic Parameters:       Telemetry: ST with PVC's    Physical Exam  Constitutional:       Appearance: Normal appearance.    HENT:      Head: Normocephalic and atraumatic.   Eyes:      Conjunctiva/sclera: Conjunctivae normal.   Neck:      Comments: Neck veins are not elevated  Cardiovascular:      Rate and Rhythm: Regular rhythm. Tachycardia present.      Comments: Smooth VAD hum  Pulmonary:      Effort: Pulmonary effort is normal.      Breath sounds: Normal breath sounds.   Abdominal:      General: Bowel sounds are normal. There is distension.   Musculoskeletal:         General: No swelling. Normal range of motion.      Cervical back: Normal range of motion and neck supple.   Skin:     General: Skin is warm and dry.      Capillary Refill: Capillary refill takes 2 to 3 seconds.   Neurological:      General: No focal deficit present.      Mental Status: He is alert and oriented to person, place, and time.   Psychiatric:         Mood and Affect: Mood normal.         Behavior: Behavior normal.         Thought Content: Thought content normal.         Judgment: Judgment normal.         Significant Labs:  CBC:  Recent Labs   Lab 01/17/22  0543 01/18/22  0639 01/19/22  0414   WBC 15.29* 14.55* 14.28*   RBC 3.79* 3.48* 3.73*   HGB 7.0* 6.8* 7.5*   HCT 24.5* 23.1* 25.3*   * 604* 580*   MCV 65* 66* 68*   MCH 18.5* 19.5* 20.1*   MCHC 28.6* 29.4* 29.6*     BNP:  Recent Labs   Lab 01/14/22  0448 01/17/22  0543 01/19/22  0414   * 369* 657*     CMP:  Recent Labs   Lab 01/14/22  0448 01/15/22  0343 01/17/22  0543 01/18/22  0639 01/19/22  0414   GLU 90   < > 73 78 72   CALCIUM 8.0*   < > 8.5* 8.3* 8.5*   ALBUMIN 1.9*  --  2.0*  --  2.0*   PROT 6.8  --  7.1  --  7.0   *   < > 136 136 136   K 4.0   < > 3.9 3.9 3.9   CO2 22*   < > 22* 24 23      < > 104 104 104   BUN 6   < > 5* 5* 5*   CREATININE 0.8   < > 0.8 0.8 0.8   ALKPHOS 73  --  80  --  84   ALT 13  --  12  --  12   AST 19  --  19  --  19   BILITOT 0.6  --  0.6  --  0.9    < > = values in this interval not displayed.      Coagulation:   Recent Labs   Lab 01/17/22  0534  01/18/22  0639 01/19/22  0414   INR 2.5* 3.0* 3.6*   APTT 35.3* 40.3* 43.1*     LDH:  Recent Labs   Lab 01/17/22  0543 01/18/22  0639 01/19/22  0414   * 294* 296*     Microbiology:  Microbiology Results (last 7 days)     Procedure Component Value Units Date/Time    Blood culture [[id number]] Collected: 01/12/22 1716    Order Status: Completed Specimen: Blood Updated: 01/17/22 2012     Blood Culture, Routine No growth after 5 days.    Narrative:      From 2 different sites 30 minutes apart  Collection has been rescheduled by PAW at 01/12/2022 16:43 Reason:   labs due for 1642  Collection has been rescheduled by PAW at 01/12/2022 16:43 Reason:   labs due for 1642    Blood culture [[id number]] Collected: 01/12/22 1715    Order Status: Completed Specimen: Blood Updated: 01/17/22 2012     Blood Culture, Routine No growth after 5 days.    Narrative:      From 2 different sites 30 minutes apart  Collection has been rescheduled by PAW at 01/12/2022 16:43 Reason:   labs due for 1642  Collection has been rescheduled by PAW at 01/12/2022 16:43 Reason:   labs due for 1642    Clostridium difficile EIA [[id number]] Collected: 01/14/22 1139    Order Status: Completed Specimen: Stool Updated: 01/14/22 2306     C. diff Antigen Negative     C difficile Toxins A+B, EIA Negative     Comment: Testing not recommended for children <24 months old.             I have reviewed all pertinent labs within the past 24 hours.    Estimated Creatinine Clearance: 112.3 mL/min (based on SCr of 0.8 mg/dL).    Diagnostic Results:  I have reviewed all pertinent imaging results/findings within the past 24 hours.

## 2022-01-19 NOTE — PROGRESS NOTES
"01/19/2022  Macey Saleh    Current provider:  Fernandez Gr Jr.,*    TXP LVAD INTERROGATIONS 1/19/2022 1/19/2022 1/19/2022 1/18/2022 1/18/2022 1/18/2022 1/18/2022   Type HeartMate3 HeartMate3 HeartMate3 HeartMate3 HeartMate3 HeartMate3 HeartMate3   Flow 4.4 4.8 4.8 5.0 4.7 4.8 4.8   Speed 5300 5300 5300 5300 5300 5300 5300   PI 4.5 3.2 3.8 2.7 3.7 3.5 3.3   Power (Centeno) 3.7 3.8 3.8 3.8 3.9 3.9 3.8   LSL 4900 4900 4900 4900 4900 4900 4900   Pulsatility No Pulse No Pulse No Pulse No Pulse No Pulse No Pulse No Pulse          As floor rounding has been requested to be limited during COVID-19 patient quarantine by Infectious Disease and leadership, only "electronic" rounding has been performed on this mechanical assist device patient.  Electronic rounding includes verifying in Epic that current settings and measurements for the device have been documented appropriately and that they are within limits.  Additionally, this rounding verifies that the EQUIPMENT section of the VAD flowsheet is documented in Epic, specifically checking the presence of emergency equipment and controller self test.  Any discrepancies will be related to the care team.    In emergency, the nursing units have been notified to contact the perfusion department either by:  Calling [phone] from 630am to 4pm Mon thru Fri, or by contacting the hospital  from 4pm to 630am and on weekends and asking to speak with the perfusionist on call.    Macey Saleh  9:55 AM  "

## 2022-01-19 NOTE — PLAN OF CARE
AAOX4,VSS,O2 sats>97% on RA.Plan of care discussed with patient.  Patient ambulating with assistx1, fall precautions in place.LVAD DP and numbers WNL, smooth LVAD hum. Patient has complaints of left leg pain,managed with PO analgesics. Discussed medications and care. Needs reinforcement with DLES dressing. Patient has no questions at this time. Will continue to monitor through the shift.

## 2022-01-19 NOTE — PT/OT/SLP PROGRESS
Physical Therapy      Patient Name:  Saba Lott   MRN:  [id number]    Patient not seen today secondary to Pain (L foot pain, RN aware). Patient states L foot pain is too bad to tolerate standing in spite of ed on benefits of attempting to stand with RW, patient deferring. Will follow-up as appropriate.

## 2022-01-19 NOTE — ASSESSMENT & PLAN NOTE
Yesterday developed acute pain in the L midfoot. Very tender to palpation on the dorsum of the L midfoot. Per patient too painful to even put bear weight. Xray -kenneth for acute changes (not concerning for fracture or osteo). Possibly atypical presentation of acute gout flare?  - Treating pain w/ colchicine, acetaminophen and tramadol

## 2022-01-19 NOTE — ASSESSMENT & PLAN NOTE
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 INR this AM 3.6. Continue holding Coumadin and gave Vit K 2 mg IV this morning (got Vit K 1 mg IV on 1/6 for INR 3.0). Repeat INR ~ noon  -LDH stable  -ECHO 1/11 at 5300 rpm: LVEDD 6.60, RV not well visualized, interval degeneration of MV, suspect partial flail posterior leaflet, cannot exclude significant MR, mild TR, IVC 15, BRYAN intermittently and septum midline  -Diuresing only prn      Procedure: Device Interrogation Including analysis of device parameters  Current Settings: Ventricular Assist Device  Review of device function is stable      TXP LVAD INTERROGATIONS 1/19/2022 1/19/2022 1/19/2022 1/19/2022 1/18/2022 1/18/2022 1/18/2022   Type HeartMate3 HeartMate3 HeartMate3 HeartMate3 HeartMate3 HeartMate3 HeartMate3   Flow 4.8 4.4 4.8 4.8 5.0 4.7 4.8   Speed 5300 5300 5300 5300 5300 5300 5300   PI 4.2 4.5 3.2 3.8 2.7 3.7 3.5   Power (Centeno) 3.8 3.7 3.8 3.8 3.8 3.9 3.9   LSL 4900 4900 4900 4900 4900 4900 4900   Pulsatility No Pulse No Pulse No Pulse No Pulse No Pulse No Pulse No Pulse

## 2022-01-19 NOTE — PROGRESS NOTES
[facility] - Cardiology Stepdown  Heart Transplant  Progress Note    Patient Name: Saba Lott  MRN: [id number]  Admission Date: 12/31/2021  Hospital Length of Stay: 19 days  Attending Physician: Fernandez Gr Jr.,*  Primary Care Provider: Mary Lombardi MD  Principal Problem:Fever    Subjective:     **Interval History: Still has non productive cough, but reports feeling generally well. Appetite remains poor. Having normal BM's. Was to get tunneled cath today by IR, but INR has jumped to 3.6. Gave Vit K 2 mg IV X 1 and will repeat INR ~ noon. Keeping NPO per rec of IR    Continuous Infusions:  Scheduled Meds:   atorvastatin  20 mg Oral QHS    [START ON 1/20/2022] colchicine  0.6 mg Oral Daily    DAPTOmycin (CUBICIN)  IV  10 mg/kg Intravenous Q24H    docusate sodium  100 mg Oral Daily    Lactobacillus rhamnosus GG  1 capsule Oral Daily    LIDOcaine  1 patch Transdermal Q24H    lisinopriL  5 mg Oral Daily    magnesium oxide  800 mg Oral TID    piperacillin-tazobactam (ZOSYN) IVPB  4.5 g Intravenous Q8H    triamcinolone acetonide 0.025%   Topical (Top) BID     PRN Meds:sodium chloride, sodium chloride, acetaminophen, albuterol-ipratropium, aluminum & magnesium hydroxide-simethicone, benzonatate, diphenhydrAMINE, guaiFENesin 100 mg/5 ml, ondansetron, traMADoL    Review of patient's allergies indicates:   Allergen Reactions    Iodine and iodide containing products      Objective:     Vital Signs (Most Recent):  Temp: 99.1 °F (37.3 °C) (01/19/22 1146)  Pulse: 104 (01/19/22 1146)  Resp: 16 (01/19/22 1146)  BP: (!) 76/0 (01/19/22 1146)  SpO2: 97 % (01/19/22 1146) Vital Signs (24h Range):  Temp:  [97.8 °F (36.6 °C)-99.1 °F (37.3 °C)] 99.1 °F (37.3 °C)  Pulse:  [] 104  Resp:  [16-20] 16  SpO2:  [94 %-100 %] 97 %  BP: (70-80)/(0) 76/0     Patient Vitals for the past 72 hrs (Last 3 readings):   Weight   01/18/22 0700 95.6 kg (210 lb 12.2 oz)   01/17/22 1200 95.6 kg (210 lb 12.2 oz)     Body mass index  is 33 kg/m².      Intake/Output Summary (Last 24 hours) at 1/19/2022 1207  Last data filed at 1/19/2022 0949  Gross per 24 hour   Intake 831.25 ml   Output 250 ml   Net 581.25 ml       Hemodynamic Parameters:       Telemetry: ST with PVC's    Physical Exam  Constitutional:       Appearance: Normal appearance.   HENT:      Head: Normocephalic and atraumatic.   Eyes:      Conjunctiva/sclera: Conjunctivae normal.   Neck:      Comments: Neck veins are not elevated  Cardiovascular:      Rate and Rhythm: Regular rhythm. Tachycardia present.      Comments: Smooth VAD hum  Pulmonary:      Effort: Pulmonary effort is normal.      Breath sounds: Normal breath sounds.   Abdominal:      General: Bowel sounds are normal. There is distension.   Musculoskeletal:         General: No swelling. Normal range of motion.      Cervical back: Normal range of motion and neck supple.   Skin:     General: Skin is warm and dry.      Capillary Refill: Capillary refill takes 2 to 3 seconds.   Neurological:      General: No focal deficit present.      Mental Status: He is alert and oriented to person, place, and time.   Psychiatric:         Mood and Affect: Mood normal.         Behavior: Behavior normal.         Thought Content: Thought content normal.         Judgment: Judgment normal.         Significant Labs:  CBC:  Recent Labs   Lab 01/17/22  0543 01/18/22  0639 01/19/22  0414   WBC 15.29* 14.55* 14.28*   RBC 3.79* 3.48* 3.73*   HGB 7.0* 6.8* 7.5*   HCT 24.5* 23.1* 25.3*   * 604* 580*   MCV 65* 66* 68*   MCH 18.5* 19.5* 20.1*   MCHC 28.6* 29.4* 29.6*     BNP:  Recent Labs   Lab 01/14/22 0448 01/17/22  0543 01/19/22  0414   * 369* 657*     CMP:  Recent Labs   Lab 01/14/22  0448 01/15/22  0343 01/17/22  0543 01/18/22  0639 01/19/22  0414   GLU 90   < > 73 78 72   CALCIUM 8.0*   < > 8.5* 8.3* 8.5*   ALBUMIN 1.9*  --  2.0*  --  2.0*   PROT 6.8  --  7.1  --  7.0   *   < > 136 136 136   K 4.0   < > 3.9 3.9 3.9   CO2 22*   < >  22* 24 23      < > 104 104 104   BUN 6   < > 5* 5* 5*   CREATININE 0.8   < > 0.8 0.8 0.8   ALKPHOS 73  --  80  --  84   ALT 13  --  12  --  12   AST 19  --  19  --  19   BILITOT 0.6  --  0.6  --  0.9    < > = values in this interval not displayed.      Coagulation:   Recent Labs   Lab 01/17/22  0543 01/18/22  0639 01/19/22  0414   INR 2.5* 3.0* 3.6*   APTT 35.3* 40.3* 43.1*     LDH:  Recent Labs   Lab 01/17/22  0543 01/18/22  0639 01/19/22  0414   * 294* 296*     Microbiology:  Microbiology Results (last 7 days)     Procedure Component Value Units Date/Time    Blood culture [[id number]] Collected: 01/12/22 1716    Order Status: Completed Specimen: Blood Updated: 01/17/22 2012     Blood Culture, Routine No growth after 5 days.    Narrative:      From 2 different sites 30 minutes apart  Collection has been rescheduled by PAW at 01/12/2022 16:43 Reason:   labs due for 1642  Collection has been rescheduled by PAW at 01/12/2022 16:43 Reason:   labs due for 1642    Blood culture [[id number]] Collected: 01/12/22 1715    Order Status: Completed Specimen: Blood Updated: 01/17/22 2012     Blood Culture, Routine No growth after 5 days.    Narrative:      From 2 different sites 30 minutes apart  Collection has been rescheduled by PAW at 01/12/2022 16:43 Reason:   labs due for 1642  Collection has been rescheduled by PAW at 01/12/2022 16:43 Reason:   labs due for 1642    Clostridium difficile EIA [[id number]] Collected: 01/14/22 1139    Order Status: Completed Specimen: Stool Updated: 01/14/22 2306     C. diff Antigen Negative     C difficile Toxins A+B, EIA Negative     Comment: Testing not recommended for children <24 months old.             I have reviewed all pertinent labs within the past 24 hours.    Estimated Creatinine Clearance: 112.3 mL/min (based on SCr of 0.8 mg/dL).    Diagnostic Results:  I have reviewed all pertinent imaging results/findings within the past 24 hours.    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 Patient is short of breath and uncomfortable at the time of giving history. He reports chills, weakness, cough, sputum production, weight gain, nausea, vomiting and trouble breathing. He went to his PCP office yesterday where his temp was 103 and was sent to the hospital afterwards. His white count is 20 on arrival. He is unable to lay flat. His HR is 102-142 sinus. He needs to be diuresed upon arrival but K is 2.9, ordered IV and PO Kcl, he vomited out oral potassium, IV replacement will be done.       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 On admit presented with cough, nausea, vomiting, weakness, and temp of 103 F  - Admits to noncompliance with suppressive Doxy PTA  - Blood cultures 1/1 & 1/2 positive for Staph. Blood cxs since 1/3 have been -ve  - Abdominal culture 1/3 positive for pseudomonas  - T max past 24 hours 99.9, WCC still 14K  - ID following, vanc changed to dapto on 1/1 given hx of VISA/VRSA/MRSA. Cefepime changed to jacoby 1/2 and then jacoby changed to Zosyn 1/6 for Pseudomonas from epigastric wound cx done 1/3. Zosyn changed to Cefepime 1/10 by ID. Switching back to Zosyn 1/13. (Current antibiotics = daptomycin + Zosyn with estimated end date 2/14/22).   - Fungitell assay 1/13 -ve  - CT c/a/p on admit with left basilar ground-glass and airspace opacities with additional slight ground-glass attenuation in the left upper lobe.  Abdomen distended and tender on 1/9 - CT c/a/p repeated 1/9 and shows increased soft tissue thickening about the distal superior mesenteric vasculature with apparent  vessel enlargement.  Engorged distal small vessel mesenteric vasculature and surrounding edema/stranding.  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  Differential also includes mesenteric panniculitis however felt less likely. Stable postoperative changes of LVAD placement.  Stable soft tissue induration about the drive line catheter within the ventral abdominal wall.  No focal fluid collection or abscess. Per Int Cards, no intervention can be done  - Long h/o PICC line issues. Blood cultures clear x 7d. INR has jumped to 3.6 - given Vit K 2 mg IV X 1 and will re check INR ~ noon. Keep NPO for now per IR who will be placing tunneled cath for home IV antibiotics    Foot pain, left  Yesterday developed acute pain in the L midfoot. Very tender to palpation on the dorsum of the L midfoot. Per patient too painful to even put bear weight. Xray -kenneth for acute changes (not concerning for fracture or osteo). Possibly atypical presentation of acute gout flare?  - Treating pain w/ colchicine, acetaminophen and tramadol      COVID-19  -COVID-19 positive 3/15  - currently breathing comfortably room air  - consideration of monoclonal antibody referral  - supportive care      Sepsis  -See fever    Complication involving left ventricular assist device (LVAD)  Procedure: Device Interrogation Including analysis of device parameters  Patient continues to remove DLES dressing to scratch DLES  Current Settings: Ventricular Assist Device  Review of device function is stable/unstable    TXP LVAD INTERROGATIONS 1/19/2022 1/19/2022 1/19/2022 1/19/2022 1/18/2022 1/18/2022 1/18/2022   Type HeartMate3 HeartMate3 HeartMate3 HeartMate3 HeartMate3 HeartMate3 HeartMate3   Flow 4.8 4.4 4.8 4.8 5.0 4.7 4.8   Speed 5300 5300 5300 5300 5300 5300 5300   PI 4.2 4.5 3.2 3.8 2.7 3.7 3.5   Power (Centeno) 3.8 3.7 3.8 3.8 3.8 3.9 3.9   LSL 4900 4900 4900 4900 4900 4900 4900   Pulsatility No Pulse No Pulse No Pulse No Pulse  No Pulse No Pulse No Pulse       MRSA bacteremia  -See fever    LVAD (left ventricular assist device) present  -S/P DT HM3 with MVR 2/2/20  -Current speed 5300  -INR goal 1.5-2.0 (hx of ICH). INR this AM 3.6. Continue holding Coumadin and gave Vit K 2 mg IV this morning (got Vit K 1 mg IV on 1/6 for INR 3.0). Repeat INR ~ noon  -LDH stable  -ECHO 1/11 at 5300 rpm: LVEDD 6.60, RV not well visualized, interval degeneration of MV, suspect partial flail posterior leaflet, cannot exclude significant MR, mild TR, IVC 15, BRYAN intermittently and septum midline  -Diuresing only prn      Procedure: Device Interrogation Including analysis of device parameters  Current Settings: Ventricular Assist Device  Review of device function is stable      TXP LVAD INTERROGATIONS 1/19/2022 1/19/2022 1/19/2022 1/19/2022 1/18/2022 1/18/2022 1/18/2022   Type HeartMate3 HeartMate3 HeartMate3 HeartMate3 HeartMate3 HeartMate3 HeartMate3   Flow 4.8 4.4 4.8 4.8 5.0 4.7 4.8   Speed 5300 5300 5300 5300 5300 5300 5300   PI 4.2 4.5 3.2 3.8 2.7 3.7 3.5   Power (Centeno) 3.8 3.7 3.8 3.8 3.8 3.9 3.9   LSL 4900 4900 4900 4900 4900 4900 4900   Pulsatility No Pulse No Pulse No Pulse No Pulse No Pulse No Pulse [place] [id number]  Heart Transplant  [facility] - Cardiology Stepdown

## 2022-01-19 NOTE — NURSING
Soap and water LVAD DLES dressing changed under sterile technique. DLES stage 1, no drainage, no redness noted. Pt tolerated it will.

## 2022-01-19 NOTE — ASSESSMENT & PLAN NOTE
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 On admit presented with cough, nausea, vomiting, weakness, and temp of 103 F  - Admits to noncompliance with suppressive Doxy PTA  - Blood cultures 1/1 & 1/2 positive for Staph. Blood cxs since 1/3 have been -ve  - Abdominal culture 1/3 positive for pseudomonas  - T max past 24 hours 99.9, WCC still 14K  - ID following, vanc changed to dapto on 1/1 given hx of VISA/VRSA/MRSA. Cefepime changed to jacoby 1/2 and then jacoby changed to Zosyn 1/6 for Pseudomonas from epigastric wound cx done 1/3. Zosyn changed to Cefepime 1/10 by ID. Switching back to Zosyn 1/13. (Current antibiotics = daptomycin + Zosyn with estimated end date 2/14/22).   - Fungitell assay 1/13 -ve  - CT c/a/p on admit with left basilar ground-glass and airspace opacities with additional slight ground-glass attenuation in the left upper lobe.  Abdomen distended and tender on 1/9 - CT c/a/p repeated 1/9 and shows increased soft tissue thickening about the distal superior mesenteric vasculature with apparent vessel enlargement.  Engorged distal small vessel mesenteric vasculature and surrounding edema/stranding.  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  Differential also includes mesenteric panniculitis however felt less likely. Stable postoperative changes of LVAD placement.  Stable soft tissue induration about the drive line catheter within the ventral abdominal wall.  No focal fluid collection or abscess. Per Int Cards, no intervention can be done  - Long h/o PICC line issues. Blood cultures clear x 7d. INR has jumped to 3.6 - given Vit K 2 mg IV X 1 and will re check INR ~ noon. Keep NPO for now per IR who will be placing tunneled cath for home IV antibiotics

## 2022-01-19 NOTE — ASSESSMENT & PLAN NOTE
Procedure: Device Interrogation Including analysis of device parameters  Patient continues to remove DLES dressing to scratch DLES  Current Settings: Ventricular Assist Device  Review of device function is stable/unstable    TXP LVAD INTERROGATIONS 1/19/2022 1/19/2022 1/19/2022 1/19/2022 1/18/2022 1/18/2022 1/18/2022   Type HeartMate3 HeartMate3 HeartMate3 HeartMate3 HeartMate3 HeartMate3 HeartMate3   Flow 4.8 4.4 4.8 4.8 5.0 4.7 4.8   Speed 5300 5300 5300 5300 5300 5300 5300   PI 4.2 4.5 3.2 3.8 2.7 3.7 3.5   Power (Centeno) 3.8 3.7 3.8 3.8 3.8 3.9 3.9   LSL 4900 4900 4900 4900 4900 4900 4900   Pulsatility No Pulse No Pulse No Pulse No Pulse No Pulse No Pulse No Pulse

## 2022-01-19 NOTE — PLAN OF CARE
Plan of care discussed with patient.  Patient ambulating independently, fall precautions in place. LVAD DP and numbers WNL, smooth LVAD hum. Left foot pain managed with tramadol. LVAD dressing change completed using sterile technique with soap and water. DLES is a 1 with minimal drainage noted on the drain sponge. Tolerated without any complication. Patient to be NPO at midnight for tunnel picc placement tomorrow. Discussed medications and care.  Patient has no questions at this time. Will continue to monitor.

## 2022-01-19 NOTE — PLAN OF CARE
Pt free of falls and injury. Pt AAOx4. Fall precautions remain in place. Isolation precautions maintained. Ambulation encouraged. Pt remains on room air. Sats >90%. ST on telemetry with LVAD artifact. LVAD hum present and smooth. VAD numbers and dopplers WDL. Wound care performed per orders. Plan of care reviewed with pt. IV antibiotics administered per MAR. Pt NPO since midnight for possible tunneled cath placement today. Importance of maintaining integrity of DLES dressing reinforced throughout shift. Pt resting comfortably. Pain managed with prn medication. Pt denies chest pain, headache, and SOB. Pt VSS, no distress, will continue to monitor.

## 2022-01-19 NOTE — PROGRESS NOTES
DISCHARGE PLANNING    SW placed call to [facility] liaison [facility] ([phone]) in order to refer Pt to [facility] for Nursing and PICC Care. Tentative HH admit date of Saturday 1/22. SW remains available.

## 2022-01-20 NOTE — PT/OT/SLP PROGRESS
Physical Therapy      Patient Name:  Saba Lott   MRN:  [id number]    Patient not seen today secondary to pt unwilling to participate. Pt declined PT tx due to reports of ongoing L foot pain. Pt reported no improvement in pain over the past couple of days. Therapist encouraged pt to participate up to tolerance and educated pt on benefits of therapy. Pt verbalized understanding, but continued to politely decline.   Will follow up as appropriate.     1/20/2022

## 2022-01-20 NOTE — TRANSFER OF CARE
"Anesthesia Transfer of Care Note    Patient: Saba Lott    Procedure(s) Performed: * No procedures listed *    Patient location: Telemetry/Step Down Unit    Anesthesia Type: general    Transport from OR: Transported from OR on room air with adequate spontaneous ventilation    Post pain: adequate analgesia    Post assessment: no apparent anesthetic complications and tolerated procedure well    Post vital signs: stable    Level of consciousness: awake, alert and oriented    Nausea/Vomiting: no nausea/vomiting    Complications: none    Transfer of care protocol was followed      Last vitals:   Visit Vitals  BP (!) 78/0 (BP Location: Left arm, Patient Position: Lying)   Pulse 109   Temp 36.8 °C (98.3 °F) (Oral)   Resp 16   Ht 5' 7.01" (1.702 m)   Wt 94.8 kg (208 lb 15.9 oz)   SpO2 (!) 94%   BMI 32.73 kg/m²     "

## 2022-01-20 NOTE — PHYSICIAN QUERY
PT Name: Saba Lott  MR #: [id number]     DOCUMENTATION CLARIFICATION      CDS/: JESSICA Gomez, RN, CCDS               Contact information: [email or web address]  This form is a permanent document in the medical record.     Query Date: January 20, 2022    By submitting this query, we are merely seeking further clarification of documentation.  Please utilize your independent clinical judgment when addressing the question(s) below.     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     Source unclear, as symptoms are nonspecific.    Blood cultures of 1/1 - 2/4 bottles + GPC, ID pending. Blood cultures 1/2 - NGTD    CT A/P 1/2 - unchanged small region of soft tissue induration and slight skin thickening about the drive line in anterior abdominal wall. No definite focal fluid collection about the LVAD.  Noted patchy LLL GGO and airspace opacities with additional patchy ground glass attenuation in DEVORAH  Slight non-specific perinephric fat stranding - U/A wnl    Last positive BCx 1/2 for Staph, all repeats NGTD. Abdominal wound culture 1/3 positive for pseudomonas  - Remains febrile, TMax 100.8 F  - ID following, vanc changed to dapto 1/1 given hx of VISA. Cefepime changed  to jacoby 1/2 and then jacoby changed to Zosyn 1/6    MRSA bacteremia  Leukocytosis improving (21K down to 16K. Tmax 100.8. patient states he feels better today    Pan sensitive pseudomonas on ABD wound cx but no active sign of infection at DLES and no cultures done  Concern that given MRSA bacteremia, may have endovascular infection if thrombus present in SMV  Epigastric wound healed    COVID-19 positive 3/15  MRSA bacteremia in VAD patient.  Pseudomonal wound infection   H & P: Dr. Tafoya 1/1              ID Consult: Dr. Eldridge 1/1          ID PN: Dr. Eldridge 1/3              HT PN: Dr. Floyd 1/7        ID PN: Dr. Kim 1/7      ID PN: Dr. Guerrier 1/11        ID PN: Dr. Guerrier 1/14     Please document your best medical opinion regarding the etiology of _sepsis__?       [   ] Abdominal Wound   [   ] LVAD   [   ] Other etiology (please specify):___________________   [  ] Clinically Undetermined     Since you are referencing Dr. Tafoya's note please ask him        Please document in your progress notes daily for the duration of treatment, until resolved, and include in your discharge summary.

## 2022-01-20 NOTE — PROGRESS NOTES
Pt stuck 3 times and IV access lost twice. Pt receiving picc today. Dr. Iraheta made aware and approved the pt being without an access until the picc this am. Will continue to monitor.

## 2022-01-20 NOTE — ASSESSMENT & PLAN NOTE
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 INR this AM down to 1.4 after getting 2 mg IV Vit K yesterday. Resume low dose Coumadin at 3 mg qd (home dose was 11.25 mg daily except 7.5 mg every Sunday)  -LDH stable  -ECHO 1/11 at 5300 rpm: LVEDD 6.60, RV not well visualized, interval degeneration of MV, suspect partial flail posterior leaflet, cannot exclude significant MR, mild TR, IVC 15, BRYAN intermittently and septum midline  -Diuresing only prn      Procedure: Device Interrogation Including analysis of device parameters  Current Settings: Ventricular Assist Device  Review of device function is stable      TXP LVAD INTERROGATIONS 1/20/2022 1/19/2022 1/19/2022 1/19/2022 1/19/2022 1/19/2022 1/19/2022   Type HeartMate3 HeartMate3 HeartMate3 HeartMate3 HeartMate3 HeartMate3 HeartMate3   Flow 4.75 4.8 4.7 4.7 4.8 4.4 4.8   Speed 5300 5300 5300 5300 5300 5300 5300   PI 3.1 3.3 3.8 3.7 4.2 4.5 3.2   Power (Centeno) 3.0 3.9 3.9 3.8 3.8 3.7 3.8   LSL 4900 - - 4900 4900 4900 4900   Pulsatility Intermittent pulse Intermittent pulse Intermittent pulse Intermittent pulse No Pulse No Pulse No Pulse

## 2022-01-20 NOTE — ASSESSMENT & PLAN NOTE
Monday developed acute pain in the L midfoot. Very tender to palpation on the dorsum of the L midfoot. Per patient too painful to even put bear weight. Xray -kenneth for acute changes (not concerning for fracture or osteo). Possibly atypical presentation of acute gout flare?  - Pain improving with Colchicine

## 2022-01-20 NOTE — ASSESSMENT & PLAN NOTE
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 On admit presented with cough, nausea, vomiting, weakness, and temp of 103 F  - Admits to noncompliance with suppressive Doxy PTA  - Blood cultures 1/1 & 1/2 positive for Staph. Blood cxs since 1/3 have been -ve  - Abdominal culture 1/3 positive for pseudomonas  - T max past 24 hours 99.9, WCC still 14K  - ID following, vanc changed to dapto on 1/1 given hx of VISA/VRSA/MRSA. Cefepime changed to jacoby 1/2 and then jacoby changed to Zosyn 1/6 for Pseudomonas from epigastric wound cx done 1/3. Zosyn changed to Cefepime 1/10 by ID. Switching back to Zosyn 1/13. (Current antibiotics = daptomycin + Zosyn with estimated end date 2/14/22).   - Fungitell assay 1/13 -ve  - CT c/a/p on admit with left basilar ground-glass and airspace opacities with additional slight ground-glass attenuation in the left upper lobe.  Abdomen distended and tender on 1/9 - CT c/a/p repeated 1/9 and shows increased soft tissue thickening about the distal superior mesenteric vasculature with apparent vessel enlargement.  Engorged distal small vessel mesenteric vasculature and surrounding edema/stranding.  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  Differential also includes mesenteric panniculitis however felt less likely. Stable postoperative changes of LVAD placement.  Stable soft tissue induration about the drive line catheter within the ventral abdominal wall.  No focal fluid collection or abscess. Per Int Cards, no intervention can be done  - Long h/o PICC line issues. Blood cultures clear x 8d. INR is down to 1.4 after getting 2 mg IV Vit K yesterday. IR will be placing tunneled cath for home IV antibiotics today  -Likely D/C home tomorrow

## 2022-01-20 NOTE — PROGRESS NOTES
PT LVAD DLES change via soap and water . Old dressing removed. Aseptic technic used throughout procedure. Cleaned with soap and water. PT DLES was a 1.  Applied foam sponge and gauze with surgical tape. PT tolerated dressing change w/o problem.
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
## 2022-01-20 NOTE — NURSING
Patient off unit @ 1221 to have Tunnel cath. Patient had all VAD equipment with him including emergency bag.

## 2022-01-20 NOTE — PROGRESS NOTES
"01/20/2022  Bill Soto Jr    Current provider:  Fernandez Gr Jr.,*    TXP LVAD INTERROGATIONS 1/19/2022 1/19/2022 1/19/2022 1/19/2022 1/19/2022 1/19/2022 1/19/2022   Type HeartMate3 HeartMate3 HeartMate3 HeartMate3 HeartMate3 HeartMate3 HeartMate3   Flow 4.8 4.7 4.7 4.8 4.4 4.8 4.8   Speed 5300 5300 5300 5300 5300 5300 5300   PI 3.3 3.8 3.7 4.2 4.5 3.2 3.8   Power (Centeno) 3.9 3.9 3.8 3.8 3.7 3.8 3.8   LSL - - 4900 4900 4900 4900 4900   Pulsatility Intermittent pulse Intermittent pulse Intermittent pulse No Pulse No Pulse No Pulse No Pulse          As floor rounding has been requested to be limited during COVID-19 patient quarantine by Infectious Disease and leadership, only "electronic" rounding has been performed on this mechanical assist device patient.  Electronic rounding includes verifying in Epic that current settings and measurements for the device have been documented appropriately and that they are within limits.  Additionally, this rounding verifies that the EQUIPMENT section of the VAD flowsheet is documented in Epic, specifically checking the presence of emergency equipment and controller self test.  Any discrepancies will be related to the care team.    In emergency, the nursing units have been notified to contact the perfusion department either by:  Calling [phone] from 630am to 4pm Mon thru Fri, or by contacting the hospital  from 4pm to 630am and on weekends and asking to speak with the perfusionist on call.    Bill Soto Jr  7:49 AM        "

## 2022-01-20 NOTE — SUBJECTIVE & OBJECTIVE
**Interval History: No complaints or overnight events. Pain left mid foot has improved on Colchicine. INR is down to 1.4 after getting 2 mg Vit K IV yesterday. To have tunneled cath for home IV antibiotics placed today, then likely D/C home tomorrow    Continuous Infusions:  Scheduled Meds:   atorvastatin  20 mg Oral QHS    colchicine  0.6 mg Oral Daily    DAPTOmycin (CUBICIN)  IV  10 mg/kg Intravenous Q24H    docusate sodium  100 mg Oral Daily    Lactobacillus rhamnosus GG  1 capsule Oral Daily    LIDOcaine  1 patch Transdermal Q24H    lisinopriL  5 mg Oral Daily    magnesium oxide  800 mg Oral TID    piperacillin-tazobactam (ZOSYN) IVPB  4.5 g Intravenous Q8H    triamcinolone acetonide 0.025%   Topical (Top) BID    warfarin  3 mg Oral Daily     PRN Meds:sodium chloride, sodium chloride, acetaminophen, albuterol-ipratropium, aluminum & magnesium hydroxide-simethicone, benzonatate, diphenhydrAMINE, diphenoxylate-atropine 2.5-0.025 mg, guaiFENesin 100 mg/5 ml, ondansetron, traMADoL    Review of patient's allergies indicates:   Allergen Reactions    Iodine and iodide containing products      Objective:     Vital Signs (Most Recent):  Temp: 98.3 °F (36.8 °C) (01/20/22 0749)  Pulse: 109 (01/20/22 1101)  Resp: 16 (01/20/22 0750)  BP: (!) 78/0 (01/20/22 0750)  SpO2: (!) 94 % (01/20/22 0750) Vital Signs (24h Range):  Temp:  [97.7 °F (36.5 °C)-99.1 °F (37.3 °C)] 98.3 °F (36.8 °C)  Pulse:  [] 109  Resp:  [14-18] 16  SpO2:  [94 %-97 %] 94 %  BP: (72-95)/(0-65) 78/0     Patient Vitals for the past 72 hrs (Last 3 readings):   Weight   01/20/22 0749 94.8 kg (208 lb 15.9 oz)   01/19/22 0721 94.2 kg (207 lb 10.8 oz)   01/18/22 0700 95.6 kg (210 lb 12.2 oz)     Body mass index is 32.73 kg/m².      Intake/Output Summary (Last 24 hours) at 1/20/2022 1110  Last data filed at 1/19/2022 2329  Gross per 24 hour   Intake 630 ml   Output 200 ml   Net 430 ml       Hemodynamic Parameters:       Telemetry: ST with  PVC's    Physical Exam  Constitutional:       Appearance: Normal appearance.   HENT:      Head: Normocephalic and atraumatic.   Eyes:      Conjunctiva/sclera: Conjunctivae normal.   Neck:      Comments: Neck veins are not elevated  Cardiovascular:      Rate and Rhythm: Regular rhythm. Tachycardia present.      Comments: Smooth VAD hum  Pulmonary:      Effort: Pulmonary effort is normal.      Breath sounds: Normal breath sounds.   Abdominal:      General: Bowel sounds are normal. There is distension.   Musculoskeletal:         General: No swelling. Normal range of motion.      Cervical back: Normal range of motion and neck supple.   Skin:     General: Skin is warm and dry.      Capillary Refill: Capillary refill takes 2 to 3 seconds.   Neurological:      General: No focal deficit present.      Mental Status: He is alert and oriented to person, place, and time.   Psychiatric:         Mood and Affect: Mood normal.         Behavior: Behavior normal.         Thought Content: Thought content normal.         Judgment: Judgment normal.         Significant Labs:  CBC:  Recent Labs   Lab 01/17/22 0543 01/18/22  0639 01/19/22  0414   WBC 15.29* 14.55* 14.28*   RBC 3.79* 3.48* 3.73*   HGB 7.0* 6.8* 7.5*   HCT 24.5* 23.1* 25.3*   * 604* 580*   MCV 65* 66* 68*   MCH 18.5* 19.5* 20.1*   MCHC 28.6* 29.4* 29.6*     BNP:  Recent Labs   Lab 01/14/22 0448 01/17/22  0543 01/19/22  0414   * 369* 657*     CMP:  Recent Labs   Lab 01/14/22  0448 01/15/22  0343 01/17/22  0543 01/17/22  0543 01/18/22  0639 01/19/22  0414 01/20/22  0437   GLU 90   < > 73   < > 78 72 70   CALCIUM 8.0*   < > 8.5*   < > 8.3* 8.5* 8.5*   ALBUMIN 1.9*  --  2.0*  --   --  2.0*  --    PROT 6.8  --  7.1  --   --  7.0  --    *   < > 136   < > 136 136 137   K 4.0   < > 3.9   < > 3.9 3.9 3.9   CO2 22*   < > 22*   < > 24 23 21*      < > 104   < > 104 104 105   BUN 6   < > 5*   < > 5* 5* 5*   CREATININE 0.8   < > 0.8   < > 0.8 0.8 0.8   ALKPHOS  73  --  80  --   --  84  --    ALT 13  --  12  --   --  12  --    AST 19  --  19  --   --  19  --    BILITOT 0.6  --  0.6  --   --  0.9  --     < > = values in this interval not displayed.      Coagulation:   Recent Labs   Lab 01/18/22  0639 01/18/22  0639 01/19/22  0414 01/19/22  1211 01/20/22  0437   INR 3.0*   < > 3.6* 2.6* 1.4*   APTT 40.3*  --  43.1*  --  23.2    < > = values in this interval not displayed.     LDH:  Recent Labs   Lab 01/18/22  0639 01/19/22  0414 01/20/22  0437   * 296* 314*     Microbiology:  Microbiology Results (last 7 days)     Procedure Component Value Units Date/Time    Blood culture [[id number]] Collected: 01/12/22 1716    Order Status: Completed Specimen: Blood Updated: 01/17/22 2012     Blood Culture, Routine No growth after 5 days.    Narrative:      From 2 different sites 30 minutes apart  Collection has been rescheduled by PAW at 01/12/2022 16:43 Reason:   labs due for 1642  Collection has been rescheduled by PAW at 01/12/2022 16:43 Reason:   labs due for 1642    Blood culture [[id number]] Collected: 01/12/22 1715    Order Status: Completed Specimen: Blood Updated: 01/17/22 2012     Blood Culture, Routine No growth after 5 days.    Narrative:      From 2 different sites 30 minutes apart  Collection has been rescheduled by PAW at 01/12/2022 16:43 Reason:   labs due for 1642  Collection has been rescheduled by PAW at 01/12/2022 16:43 Reason:   labs due for 1642    Clostridium difficile EIA [[id number]] Collected: 01/14/22 1139    Order Status: Completed Specimen: Stool Updated: 01/14/22 2306     C. diff Antigen Negative     C difficile Toxins A+B, EIA Negative     Comment: Testing not recommended for children <24 months old.             I have reviewed all pertinent labs within the past 24 hours.    Estimated Creatinine Clearance: 111.8 mL/min (based on SCr of 0.8 mg/dL).    Diagnostic Results:  I have reviewed all pertinent imaging results/findings within the past 24 hours.

## 2022-01-20 NOTE — PLAN OF CARE
Pt Cooperative with routine care and procedures except for peeling the edges of his LVAD dressing. Pt AOx4. Pt VSS. Pt Independent. Pt LVAD numbers within normal range. Pt remained free from SOB, pain, falls, and injury.     Problem: Infection  Goal: Absence of Infection Signs and Symptoms  Outcome: Ongoing, Progressing     Problem: Impaired Wound Healing  Goal: Optimal Wound Healing  Outcome: Ongoing, Progressing     Problem: Bleeding (Ventricular Assist Device)  Goal: Absence of Bleeding  Outcome: Ongoing, Progressing     Problem: Embolism (Ventricular Assist Device)  Goal: Absence of Embolism Signs and Symptoms  Outcome: Ongoing, Progressing     Problem: Hemodynamic Instability (Ventricular Assist Device)  Goal: Optimal Blood Flow  Outcome: Ongoing, Progressing     Problem: Infection (Ventricular Assist Device)  Goal: Absence of Infection Signs and Symptoms  Outcome: Ongoing, Progressing     Problem: Right-Sided Heart Compromise (Ventricular Assist Device)  Goal: Effective Right-Sided Heart Function  Outcome: Ongoing, Progressing     Problem: Adult Inpatient Plan of Care  Goal: Plan of Care Review  Outcome: Ongoing, Progressing  Flowsheets (Taken 1/20/2022 0539)  Plan of Care Reviewed With: patient  Goal: Patient-Specific Goal (Individualized)  Outcome: Ongoing, Progressing  Flowsheets (Taken 1/20/2022 0539)  Anxieties, Fears or Concerns: Concerned about being stuck again and LVAD dressing.  Individualized Care Needs: IV sbx, Lvad Dressing.  Patient-Specific Goals (Include Timeframe): For pt not to removed dressing.  Goal: Absence of Hospital-Acquired Illness or Injury  Outcome: Ongoing, Progressing  Goal: Optimal Comfort and Wellbeing  Outcome: Ongoing, Progressing  Goal: Readiness for Transition of Care  Outcome: Ongoing, Progressing     Problem: Adult Inpatient Plan of Care  Goal: Patient-Specific Goal (Individualized)  Outcome: Ongoing, Progressing  Flowsheets (Taken 1/20/2022 0539)  Anxieties, Fears or  Concerns: Concerned about being stuck again and LVAD dressing.  Individualized Care Needs: IV sbx, Lvad Dressing.  Patient-Specific Goals (Include Timeframe): For pt not to removed dressing.     Problem: Adult Inpatient Plan of Care  Goal: Absence of Hospital-Acquired Illness or Injury  Outcome: Ongoing, Progressing     Problem: Adjustment to Illness (Sepsis/Septic Shock)  Goal: Optimal Coping  Outcome: Ongoing, Progressing     Problem: Coping Ineffective  Goal: Effective Coping  Outcome: Ongoing, Progressing     Problem: Nutrition Impaired (Sepsis/Septic Shock)  Goal: Optimal Nutrition Intake  Outcome: Ongoing, Progressing     Problem: Infection Progression (Sepsis/Septic Shock)  Goal: Absence of Infection Signs and Symptoms  Outcome: Ongoing, Progressing     Problem: Glycemic Control Impaired (Sepsis/Septic Shock)  Goal: Blood Glucose Level Within Desired Range  Outcome: Ongoing, Progressing     Problem: Skin Injury Risk Increased  Goal: Skin Health and Integrity  Outcome: Ongoing, Progressing

## 2022-01-20 NOTE — PROGRESS NOTES
[facility] - [facility]iology Step[facility]  Heart Transplant  Progress Note    Patient Name: Saba Lott  MRN: [id number]  Admission Date: 12/31/2021  Hospital Length of Stay: 20 days  Attending Physician: Fernandez Gr Jr.,*  Primary Care Provider: Mary Lombardi MD  Principal Problem:Fever    Subjective:     **Interval History: No complaints or overnight events. Pain left mid foot has improved on Colchicine. INR is down to 1.4 after getting 2 mg Vit K IV yesterday. To have tunneled cath for home IV antibiotics placed today, then likely D/C home tomorrow    Continuous Infusions:  Scheduled Meds:   atorvastatin  20 mg Oral QHS    colchicine  0.6 mg Oral Daily    DAPTOmycin (CUBICIN)  IV  10 mg/kg Intravenous Q24H    docusate sodium  100 mg Oral Daily    Lactobacillus rhamnosus GG  1 capsule Oral Daily    LIDOcaine  1 patch Transdermal Q24H    lisinopriL  5 mg Oral Daily    magnesium oxide  800 mg Oral TID    piperacillin-tazobactam (ZOSYN) IVPB  4.5 g Intravenous Q8H    triamcinolone acetonide 0.025%   Topical (Top) BID    warfarin  3 mg Oral Daily     PRN Meds:sodium chloride, sodium chloride, acetaminophen, albuterol-ipratropium, aluminum & magnesium hydroxide-simethicone, benzonatate, diphenhydrAMINE, diphenoxylate-atropine 2.5-0.025 mg, guaiFENesin 100 mg/5 ml, ondansetron, traMADoL    Review of patient's allergies indicates:   Allergen Reactions    Iodine and iodide containing products      Objective:     Vital Signs (Most Recent):  Temp: 98.3 °F (36.8 °C) (01/20/22 0749)  Pulse: 109 (01/20/22 1101)  Resp: 16 (01/20/22 0750)  BP: (!) 78/0 (01/20/22 0750)  SpO2: (!) 94 % (01/20/22 0750) Vital Signs (24h Range):  Temp:  [97.7 °F (36.5 °C)-99.1 °F (37.3 °C)] 98.3 °F (36.8 °C)  Pulse:  [] 109  Resp:  [14-18] 16  SpO2:  [94 %-97 %] 94 %  BP: (72-95)/(0-65) 78/0     Patient Vitals for the past 72 hrs (Last 3 readings):   Weight   01/20/22 0749 94.8 kg (208 lb 15.9 oz)   01/19/22 0721 94.2 kg (207 lb  10.8 oz)   01/18/22 0700 95.6 kg (210 lb 12.2 oz)     Body mass index is 32.73 kg/m².      Intake/Output Summary (Last 24 hours) at 1/20/2022 1110  Last data filed at 1/19/2022 2329  Gross per 24 hour   Intake 630 ml   Output 200 ml   Net 430 ml       Hemodynamic Parameters:       Telemetry: ST with PVC's    Physical Exam  Constitutional:       Appearance: Normal appearance.   HENT:      Head: Normocephalic and atraumatic.   Eyes:      Conjunctiva/sclera: Conjunctivae normal.   Neck:      Comments: Neck veins are not elevated  Cardiovascular:      Rate and Rhythm: Regular rhythm. Tachycardia present.      Comments: Smooth VAD hum  Pulmonary:      Effort: Pulmonary effort is normal.      Breath sounds: Normal breath sounds.   Abdominal:      General: Bowel sounds are normal. There is distension.   Musculoskeletal:         General: No swelling. Normal range of motion.      Cervical back: Normal range of motion and neck supple.   Skin:     General: Skin is warm and dry.      Capillary Refill: Capillary refill takes 2 to 3 seconds.   Neurological:      General: No focal deficit present.      Mental Status: He is alert and oriented to person, place, and time.   Psychiatric:         Mood and Affect: Mood normal.         Behavior: Behavior normal.         Thought Content: Thought content normal.         Judgment: Judgment normal.         Significant Labs:  CBC:  Recent Labs   Lab 01/17/22  0543 01/18/22  0639 01/19/22  0414   WBC 15.29* 14.55* 14.28*   RBC 3.79* 3.48* 3.73*   HGB 7.0* 6.8* 7.5*   HCT 24.5* 23.1* 25.3*   * 604* 580*   MCV 65* 66* 68*   MCH 18.5* 19.5* 20.1*   MCHC 28.6* 29.4* 29.6*     BNP:  Recent Labs   Lab 01/14/22 0448 01/17/22  0543 01/19/22  0414   * 369* 657*     CMP:  Recent Labs   Lab 01/14/22  0448 01/15/22  0343 01/17/22  0543 01/17/22  0543 01/18/22  0639 01/19/22  0414 01/20/22  0437   GLU 90   < > 73   < > 78 72 70   CALCIUM 8.0*   < > 8.5*   < > 8.3* 8.5* 8.5*   ALBUMIN 1.9*   --  2.0*  --   --  2.0*  --    PROT 6.8  --  7.1  --   --  7.0  --    *   < > 136   < > 136 136 137   K 4.0   < > 3.9   < > 3.9 3.9 3.9   CO2 22*   < > 22*   < > 24 23 21*      < > 104   < > 104 104 105   BUN 6   < > 5*   < > 5* 5* 5*   CREATININE 0.8   < > 0.8   < > 0.8 0.8 0.8   ALKPHOS 73  --  80  --   --  84  --    ALT 13  --  12  --   --  12  --    AST 19  --  19  --   --  19  --    BILITOT 0.6  --  0.6  --   --  0.9  --     < > = values in this interval not displayed.      Coagulation:   Recent Labs   Lab 01/18/22  0639 01/18/22  0639 01/19/22  0414 01/19/22  1211 01/20/22  0437   INR 3.0*   < > 3.6* 2.6* 1.4*   APTT 40.3*  --  43.1*  --  23.2    < > = values in this interval not displayed.     LDH:  Recent Labs   Lab 01/18/22  0639 01/19/22  0414 01/20/22  0437   * 296* 314*     Microbiology:  Microbiology Results (last 7 days)     Procedure Component Value Units Date/Time    Blood culture [[id number]] Collected: 01/12/22 1716    Order Status: Completed Specimen: Blood Updated: 01/17/22 2012     Blood Culture, Routine No growth after 5 days.    Narrative:      From 2 different sites 30 minutes apart  Collection has been rescheduled by PAW at 01/12/2022 16:43 Reason:   labs due for 1642  Collection has been rescheduled by PAW at 01/12/2022 16:43 Reason:   labs due for 1642    Blood culture [[id number]] Collected: 01/12/22 1715    Order Status: Completed Specimen: Blood Updated: 01/17/22 2012     Blood Culture, Routine No growth after 5 days.    Narrative:      From 2 different sites 30 minutes apart  Collection has been rescheduled by PAW at 01/12/2022 16:43 Reason:   labs due for 1642  Collection has been rescheduled by PAW at 01/12/2022 16:43 Reason:   labs due for 1642    Clostridium difficile EIA [[id number]] Collected: 01/14/22 1139    Order Status: Completed Specimen: Stool Updated: 01/14/22 3652     C. diff Antigen Negative     C difficile Toxins A+B, EIA Negative     Comment: Testing  not recommended for children <24 months old.             I have reviewed all pertinent labs within the past 24 hours.    Estimated Creatinine Clearance: 111.8 mL/min (based on SCr of 0.8 mg/dL).    Diagnostic Results:  I have reviewed all pertinent imaging results/findings within the past 24 hours.    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 Patient is short of breath and uncomfortable at the time of giving history. He reports chills, weakness, cough, sputum production, weight gain, nausea, vomiting and trouble breathing. He went to his PCP office yesterday where his temp was 103 and was sent to the hospital afterwards. His white count is 20 on arrival. He is unable to lay flat. His HR is 102-142 sinus. He needs to be diuresed upon arrival but K is 2.9, ordered IV and PO Kcl, he vomited out oral potassium, IV replacement will be done.       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 On admit presented with cough, nausea, vomiting, weakness, and temp of 103 F  - Admits to noncompliance with suppressive Doxy PTA  - Blood cultures 1/1 & 1/2 positive for Staph. Blood cxs since 1/3 have been -ve  - Abdominal culture 1/3 positive for pseudomonas  - T max past 24 hours 99.9, WCC still 14K  - ID following, vanc changed to dapto on 1/1 given hx of VISA/VRSA/MRSA. Cefepime changed to jacoby 1/2 and then jacoby changed to Zosyn 1/6 for Pseudomonas from epigastric wound cx done 1/3. Zosyn changed to Cefepime 1/10 by ID. Switching back to Zosyn 1/13. (Current antibiotics = daptomycin + Zosyn with estimated end date 2/14/22).   -  Fungitell assay 1/13 -ve  - CT c/a/p on admit with left basilar ground-glass and airspace opacities with additional slight ground-glass attenuation in the left upper lobe.  Abdomen distended and tender on 1/9 - CT c/a/p repeated 1/9 and shows increased soft tissue thickening about the distal superior mesenteric vasculature with apparent vessel enlargement.  Engorged distal small vessel mesenteric vasculature and surrounding edema/stranding.  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  Differential also includes mesenteric panniculitis however felt less likely. Stable postoperative changes of LVAD placement.  Stable soft tissue induration about the drive line catheter within the ventral abdominal wall.  No focal fluid collection or abscess. Per Int Cards, no intervention can be done  - Long h/o PICC line issues. Blood cultures clear x 8d. INR is down to 1.4 after getting 2 mg IV Vit K yesterday. IR will be placing tunneled cath for home IV antibiotics today  -Likely D/C home tomorrow    Foot pain, left  Monday developed acute pain in the L midfoot. Very tender to palpation on the dorsum of the L midfoot. Per patient too painful to even put bear weight. Xray -kenneth for acute changes (not concerning for fracture or osteo). Possibly atypical presentation of acute gout flare?  - Pain improving with Colchicine      COVID-19  -COVID-19 positive 3/15  - currently breathing comfortably room air  - consideration of monoclonal antibody referral  - supportive care      Sepsis  -See fever    Complication involving left ventricular assist device (LVAD)  Procedure: Device Interrogation Including analysis of device parameters  Patient continues to remove DLES dressing to scratch DLES  Current Settings: Ventricular Assist Device  Review of device function is stable/unstable    TXP LVAD INTERROGATIONS 1/20/2022 1/19/2022 1/19/2022 1/19/2022 1/19/2022 1/19/2022 1/19/2022   Type HeartMate3 HeartMate3  HeartMate3 HeartMate3 HeartMate3 HeartMate3 HeartMate3   Flow 4.75 4.8 4.7 4.7 4.8 4.4 4.8   Speed 5300 5300 5300 5300 5300 5300 5300   PI 3.1 3.3 3.8 3.7 4.2 4.5 3.2   Power (Centeno) 3.0 3.9 3.9 3.8 3.8 3.7 3.8   LSL 4900 - - 4900 4900 4900 4900   Pulsatility Intermittent pulse Intermittent pulse Intermittent pulse Intermittent pulse No Pulse No Pulse No Pulse       MRSA bacteremia  -See fever    LVAD (left ventricular assist device) present  -S/P DT HM3 with MVR 2/2/20  -Current speed 5300  -INR goal 1.5-2.0 (hx of ICH). INR this AM down to 1.4 after getting 2 mg IV Vit K yesterday. Resume low dose Coumadin at 3 mg qd (home dose was 11.25 mg daily except 7.5 mg every Sunday)  -LDH stable  -ECHO 1/11 at 5300 rpm: LVEDD 6.60, RV not well visualized, interval degeneration of MV, suspect partial flail posterior leaflet, cannot exclude significant MR, mild TR, IVC 15, BRYAN intermittently and septum midline  -Diuresing only prn      Procedure: Device Interrogation Including analysis of device parameters  Current Settings: Ventricular Assist Device  Review of device function is stable      TXP LVAD INTERROGATIONS 1/20/2022 1/19/2022 1/19/2022 1/19/2022 1/19/2022 1/19/2022 1/19/2022   Type HeartMate3 HeartMate3 HeartMate3 HeartMate3 HeartMate3 HeartMate3 HeartMate3   Flow 4.75 4.8 4.7 4.7 4.8 4.4 4.8   Speed 5300 5300 5300 5300 5300 5300 5300   PI 3.1 3.3 3.8 3.7 4.2 4.5 3.2   Power (Centeno) 3.0 3.9 3.9 3.8 3.8 3.7 3.8   LSL 4900 - - 4900 4900 4900 4900   Pulsatility Intermittent pulse Intermittent pulse Intermittent pulse Intermittent pulse No Pulse No Pulse [place] [id number]  Heart Transplant  [facility] - [facility] Step[facility]

## 2022-01-20 NOTE — CARE UPDATE
RAPID RESPONSE NURSE ROUND       Rounding completed with charge RN, Maryam. No concerns verbalized at this time. Instructed to call [phone] for further concerns or assistance.

## 2022-01-20 NOTE — PLAN OF CARE
Patient in IR88 for tunneled cath. AAOx3, no distress noted, respirations even and unlabored, will continue to monitor. Acceptance of education, consents signed, H/P done. Labs reviewed. Anesthesia will monitor at bedside. Airborne and contact isolation precautions in place.

## 2022-01-21 NOTE — SUBJECTIVE & OBJECTIVE
**Interval History: Tunneled PICC line placed yesterday by IR. Ready for discharge home with home health    Continuous Infusions:  Scheduled Meds:   atorvastatin  20 mg Oral QHS    colchicine  0.6 mg Oral Daily    DAPTOmycin (CUBICIN)  IV  10 mg/kg Intravenous Once    docusate sodium  100 mg Oral Daily    Lactobacillus rhamnosus GG  1 capsule Oral Daily    LIDOcaine  1 patch Transdermal Q24H    lisinopriL  5 mg Oral Daily    magnesium oxide  800 mg Oral TID    piperacillin-tazobactam (ZOSYN) IVPB  4.5 g Intravenous Q8H    triamcinolone acetonide 0.025%   Topical (Top) BID    warfarin  3 mg Oral Daily     PRN Meds:sodium chloride, sodium chloride, acetaminophen, albuterol-ipratropium, aluminum & magnesium hydroxide-simethicone, benzonatate, diphenhydrAMINE, diphenoxylate-atropine 2.5-0.025 mg, guaiFENesin 100 mg/5 ml, ondansetron, traMADoL    Review of patient's allergies indicates:   Allergen Reactions    Iodine and iodide containing products      Objective:     Vital Signs (Most Recent):  Temp: 98.6 °F (37 °C) (01/21/22 0756)  Pulse: 108 (01/21/22 1115)  Resp: 18 (01/21/22 0756)  BP: (!) 84/0 (01/21/22 0756)  SpO2: 95 % (01/21/22 0828) Vital Signs (24h Range):  Temp:  [97.6 °F (36.4 °C)-99.1 °F (37.3 °C)] 98.6 °F (37 °C)  Pulse:  [] 108  Resp:  [16-18] 18  SpO2:  [92 %-97 %] 95 %  BP: (74-97)/(0-55) 84/0     Patient Vitals for the past 72 hrs (Last 3 readings):   Weight   01/20/22 0749 94.8 kg (208 lb 15.9 oz)   01/19/22 0721 94.2 kg (207 lb 10.8 oz)     Body mass index is 32.73 kg/m².      Intake/Output Summary (Last 24 hours) at 1/21/2022 1126  Last data filed at 1/21/2022 0800  Gross per 24 hour   Intake 1180 ml   Output --   Net 1180 ml       Hemodynamic Parameters:       Telemetry: ST with PVC's    Physical Exam  Constitutional:       Appearance: Normal appearance.   HENT:      Head: Normocephalic and atraumatic.   Eyes:      Conjunctiva/sclera: Conjunctivae normal.   Neck:      Comments:  Neck veins are not elevated  Cardiovascular:      Rate and Rhythm: Regular rhythm. Tachycardia present.      Comments: Smooth VAD hum  Pulmonary:      Effort: Pulmonary effort is normal.      Breath sounds: Normal breath sounds.   Abdominal:      General: Bowel sounds are normal. There is distension.   Musculoskeletal:         General: No swelling. Normal range of motion.      Cervical back: Normal range of motion and neck supple.   Skin:     General: Skin is warm and dry.      Capillary Refill: Capillary refill takes 2 to 3 seconds.   Neurological:      General: No focal deficit present.      Mental Status: He is alert and oriented to person, place, and time.   Psychiatric:         Mood and Affect: Mood normal.         Behavior: Behavior normal.         Thought Content: Thought content normal.         Judgment: Judgment normal.         Significant Labs:  CBC:  Recent Labs   Lab 01/19/22  0414 01/20/22  1053 01/21/22  0417   WBC 14.28* 12.70 13.05*   RBC 3.73* 3.40* 2.86*   HGB 7.5* 7.5* 7.7*   HCT 25.3* 24.5* 22.4*   * 558* 635*   MCV 68* 72* 78*   MCH 20.1* 22.1* 26.9*   MCHC 29.6* 30.6* 34.4     BNP:  Recent Labs   Lab 01/17/22  0543 01/19/22  0414 01/21/22  0417   * 657* 626*     CMP:  Recent Labs   Lab 01/17/22  0543 01/18/22  0639 01/19/22  0414 01/20/22  0437 01/21/22  0417   GLU 73   < > 72 70 98   CALCIUM 8.5*   < > 8.5* 8.5* 8.3*   ALBUMIN 2.0*  --  2.0*  --  1.9*   PROT 7.1  --  7.0  --  6.8      < > 136 137 133*   K 3.9   < > 3.9 3.9 3.7   CO2 22*   < > 23 21* 21*      < > 104 105 104   BUN 5*   < > 5* 5* 5*   CREATININE 0.8   < > 0.8 0.8 0.8   ALKPHOS 80  --  84  --  93   ALT 12  --  12  --  12   AST 19  --  19  --  25   BILITOT 0.6  --  0.9  --  0.6    < > = values in this interval not displayed.      Coagulation:   Recent Labs   Lab 01/19/22  0414 01/19/22  0414 01/19/22  1211 01/20/22  0437 01/21/22 0417   INR 3.6*   < > 2.6* 1.4* 1.4*   APTT 43.1*  --   --  23.2 32.6*    <  > = values in this interval not displayed.     LDH:  Recent Labs   Lab 01/19/22  0414 01/20/22  0437 01/21/22  0417   * 314* 421*     Microbiology:  Microbiology Results (last 7 days)     Procedure Component Value Units Date/Time    Blood culture [[id number]] Collected: 01/12/22 1716    Order Status: Completed Specimen: Blood Updated: 01/17/22 2012     Blood Culture, Routine No growth after 5 days.    Narrative:      From 2 different sites 30 minutes apart  Collection has been rescheduled by PAW at 01/12/2022 16:43 Reason:   labs due for 1642  Collection has been rescheduled by PAW at 01/12/2022 16:43 Reason:   labs due for 1642    Blood culture [[id number]] Collected: 01/12/22 1715    Order Status: Completed Specimen: Blood Updated: 01/17/22 2012     Blood Culture, Routine No growth after 5 days.    Narrative:      From 2 different sites 30 minutes apart  Collection has been rescheduled by PAW at 01/12/2022 16:43 Reason:   labs due for 1642  Collection has been rescheduled by PAW at 01/12/2022 16:43 Reason:   labs due for 1642    Clostridium difficile EIA [[id number]] Collected: 01/14/22 1139    Order Status: Completed Specimen: Stool Updated: 01/14/22 2306     C. diff Antigen Negative     C difficile Toxins A+B, EIA Negative     Comment: Testing not recommended for children <24 months old.             I have reviewed all pertinent labs within the past 24 hours.    Estimated Creatinine Clearance: 111.8 mL/min (based on SCr of 0.8 mg/dL).    Diagnostic Results:  I have reviewed all pertinent imaging results/findings within the past 24 hours.

## 2022-01-21 NOTE — ASSESSMENT & PLAN NOTE
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 On admit presented with cough, nausea, vomiting, weakness, and temp of 103 F  - Admits to noncompliance with suppressive Doxy PTA  - Blood cultures 1/1 & 1/2 positive for Staph. Blood cxs since 1/3 have been -ve  - Abdominal culture 1/3 positive for pseudomonas  - T max past 24 hours 99.9, WCC still 14K  - ID following, vanc changed to dapto on 1/1 given hx of VISA/VRSA/MRSA. Cefepime changed to jacoby 1/2 and then jacoby changed to Zosyn 1/6 for Pseudomonas from epigastric wound cx done 1/3. Zosyn changed to Cefepime 1/10 by ID. Switching back to Zosyn 1/13. (Current antibiotics = daptomycin + Zosyn with estimated end date 2/14/22).   - Fungitell assay 1/13 -ve  - CT c/a/p on admit with left basilar ground-glass and airspace opacities with additional slight ground-glass attenuation in the left upper lobe.  Abdomen distended and tender on 1/9 - CT c/a/p repeated 1/9 and shows increased soft tissue thickening about the distal superior mesenteric vasculature with apparent vessel enlargement.  Engorged distal small vessel mesenteric vasculature and surrounding edema/stranding.  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  Differential also includes mesenteric panniculitis however felt less likely. Stable postoperative changes of LVAD placement.  Stable soft tissue induration about the drive line catheter within the ventral abdominal wall.  No focal fluid collection or abscess. Per Int Cards, no intervention can be done  - Long h/o PICC line issues. Blood cultures clear x 9d. IR placed tunneled cath for home IV antibiotics yesterday  - D/C home with home health and close clinic f/u

## 2022-01-21 NOTE — PROGRESS NOTES
[facility] - Cardiology Stepdown  Heart Transplant  Progress Note    Patient Name: Saba Lott  MRN: [id number]  Admission Date: 12/31/2021  Hospital Length of Stay: 21 days  Attending Physician: Fernandez Gr Jr.,*  Primary Care Provider: Mary Lombardi MD  Principal Problem:Fever    Subjective:     **Interval History: Tunneled PICC line placed yesterday by IR. Ready for discharge home with home health    Continuous Infusions:  Scheduled Meds:   atorvastatin  20 mg Oral QHS    colchicine  0.6 mg Oral Daily    DAPTOmycin (CUBICIN)  IV  10 mg/kg Intravenous Once    docusate sodium  100 mg Oral Daily    Lactobacillus rhamnosus GG  1 capsule Oral Daily    LIDOcaine  1 patch Transdermal Q24H    lisinopriL  5 mg Oral Daily    magnesium oxide  800 mg Oral TID    piperacillin-tazobactam (ZOSYN) IVPB  4.5 g Intravenous Q8H    triamcinolone acetonide 0.025%   Topical (Top) BID    warfarin  3 mg Oral Daily     PRN Meds:sodium chloride, sodium chloride, acetaminophen, albuterol-ipratropium, aluminum & magnesium hydroxide-simethicone, benzonatate, diphenhydrAMINE, diphenoxylate-atropine 2.5-0.025 mg, guaiFENesin 100 mg/5 ml, ondansetron, traMADoL    Review of patient's allergies indicates:   Allergen Reactions    Iodine and iodide containing products      Objective:     Vital Signs (Most Recent):  Temp: 98.6 °F (37 °C) (01/21/22 0756)  Pulse: 108 (01/21/22 1115)  Resp: 18 (01/21/22 0756)  BP: (!) 84/0 (01/21/22 0756)  SpO2: 95 % (01/21/22 0828) Vital Signs (24h Range):  Temp:  [97.6 °F (36.4 °C)-99.1 °F (37.3 °C)] 98.6 °F (37 °C)  Pulse:  [] 108  Resp:  [16-18] 18  SpO2:  [92 %-97 %] 95 %  BP: (74-97)/(0-55) 84/0     Patient Vitals for the past 72 hrs (Last 3 readings):   Weight   01/20/22 0749 94.8 kg (208 lb 15.9 oz)   01/19/22 0721 94.2 kg (207 lb 10.8 oz)     Body mass index is 32.73 kg/m².      Intake/Output Summary (Last 24 hours) at 1/21/2022 1126  Last data filed at 1/21/2022 0800  Gross per 24  hour   Intake 1180 ml   Output --   Net 1180 ml       Hemodynamic Parameters:       Telemetry: ST with PVC's    Physical Exam  Constitutional:       Appearance: Normal appearance.   HENT:      Head: Normocephalic and atraumatic.   Eyes:      Conjunctiva/sclera: Conjunctivae normal.   Neck:      Comments: Neck veins are not elevated  Cardiovascular:      Rate and Rhythm: Regular rhythm. Tachycardia present.      Comments: Smooth VAD hum  Pulmonary:      Effort: Pulmonary effort is normal.      Breath sounds: Normal breath sounds.   Abdominal:      General: Bowel sounds are normal. There is distension.   Musculoskeletal:         General: No swelling. Normal range of motion.      Cervical back: Normal range of motion and neck supple.   Skin:     General: Skin is warm and dry.      Capillary Refill: Capillary refill takes 2 to 3 seconds.   Neurological:      General: No focal deficit present.      Mental Status: He is alert and oriented to person, place, and time.   Psychiatric:         Mood and Affect: Mood normal.         Behavior: Behavior normal.         Thought Content: Thought content normal.         Judgment: Judgment normal.         Significant Labs:  CBC:  Recent Labs   Lab 01/19/22  0414 01/20/22  1053 01/21/22  0417   WBC 14.28* 12.70 13.05*   RBC 3.73* 3.40* 2.86*   HGB 7.5* 7.5* 7.7*   HCT 25.3* 24.5* 22.4*   * 558* 635*   MCV 68* 72* 78*   MCH 20.1* 22.1* 26.9*   MCHC 29.6* 30.6* 34.4     BNP:  Recent Labs   Lab 01/17/22  0543 01/19/22  0414 01/21/22  0417   * 657* 626*     CMP:  Recent Labs   Lab 01/17/22  0543 01/18/22  0639 01/19/22  0414 01/20/22  0437 01/21/22  0417   GLU 73   < > 72 70 98   CALCIUM 8.5*   < > 8.5* 8.5* 8.3*   ALBUMIN 2.0*  --  2.0*  --  1.9*   PROT 7.1  --  7.0  --  6.8      < > 136 137 133*   K 3.9   < > 3.9 3.9 3.7   CO2 22*   < > 23 21* 21*      < > 104 105 104   BUN 5*   < > 5* 5* 5*   CREATININE 0.8   < > 0.8 0.8 0.8   ALKPHOS 80  --  84  --  93   ALT  12  --  12  --  12   AST 19  --  19  --  25   BILITOT 0.6  --  0.9  --  0.6    < > = values in this interval not displayed.      Coagulation:   Recent Labs   Lab 01/19/22  0414 01/19/22  0414 01/19/22  1211 01/20/22  0437 01/21/22 0417   INR 3.6*   < > 2.6* 1.4* 1.4*   APTT 43.1*  --   --  23.2 32.6*    < > = values in this interval not displayed.     LDH:  Recent Labs   Lab 01/19/22  0414 01/20/22  0437 01/21/22 0417   * 314* 421*     Microbiology:  Microbiology Results (last 7 days)     Procedure Component Value Units Date/Time    Blood culture [[id number]] Collected: 01/12/22 1716    Order Status: Completed Specimen: Blood Updated: 01/17/22 2012     Blood Culture, Routine No growth after 5 days.    Narrative:      From 2 different sites 30 minutes apart  Collection has been rescheduled by PAW at 01/12/2022 16:43 Reason:   labs due for 1642  Collection has been rescheduled by PAW at 01/12/2022 16:43 Reason:   labs due for 1642    Blood culture [[id number]] Collected: 01/12/22 1715    Order Status: Completed Specimen: Blood Updated: 01/17/22 2012     Blood Culture, Routine No growth after 5 days.    Narrative:      From 2 different sites 30 minutes apart  Collection has been rescheduled by PAW at 01/12/2022 16:43 Reason:   labs due for 1642  Collection has been rescheduled by PAW at 01/12/2022 16:43 Reason:   labs due for 1642    Clostridium difficile EIA [[id number]] Collected: 01/14/22 1139    Order Status: Completed Specimen: Stool Updated: 01/14/22 2306     C. diff Antigen Negative     C difficile Toxins A+B, EIA Negative     Comment: Testing not recommended for children <24 months old.             I have reviewed all pertinent labs within the past 24 hours.    Estimated Creatinine Clearance: 111.8 mL/min (based on SCr of 0.8 mg/dL).    Diagnostic Results:  I have reviewed all pertinent imaging results/findings within the past 24 hours.    Assessment and Plan:     Patient is a 55 year old male with a PMHx  of DCM s/p Hm3 (2/2020 after presenting to hospital with cardiogenic shock requiring IABP and CRRT), recent ICH 2/2 mycotic aneurysm, MRSA bacteremia, who is presenting to the hospital from an OSH for evaluation/treatment of sepsis and ADHF. Patient is short of breath and uncomfortable at the time of giving history. He reports chills, weakness, cough, sputum production, weight gain, nausea, vomiting and trouble breathing. He went to his PCP office yesterday where his temp was 103 and was sent to the hospital afterwards. His white count is 20 on arrival. He is unable to lay flat. His HR is 102-142 sinus. He needs to be diuresed upon arrival but K is 2.9, ordered IV and PO Kcl, he vomited out oral potassium, IV replacement will be done.       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 On admit presented with cough, nausea, vomiting, weakness, and temp of 103 F  - Admits to noncompliance with suppressive Doxy PTA  - Blood cultures 1/1 & 1/2 positive for Staph. Blood cxs since 1/3 have been -ve  - Abdominal culture 1/3 positive for pseudomonas  - T max past 24 hours 99.9, WCC still 14K  - ID following, vanc changed to dapto on 1/1 given hx of VISA/VRSA/MRSA. Cefepime changed to jacoby 1/2 and then jacoby changed to Zosyn 1/6 for Pseudomonas from epigastric wound cx done 1/3. Zosyn changed to Cefepime 1/10 by ID. Switching back to Zosyn 1/13. (Current antibiotics = daptomycin + Zosyn with estimated end date 2/14/22).   - Fungitell assay 1/13 -ve  - CT c/a/p on admit with left basilar ground-glass and airspace opacities with additional slight ground-glass attenuation in the left upper lobe.  Abdomen distended and tender on 1/9 - CT c/a/p repeated 1/9 and shows increased soft tissue thickening about the distal superior mesenteric vasculature with apparent vessel enlargement.  Engorged distal  small vessel mesenteric vasculature and surrounding edema/stranding.  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  Differential also includes mesenteric panniculitis however felt less likely. Stable postoperative changes of LVAD placement.  Stable soft tissue induration about the drive line catheter within the ventral abdominal wall.  No focal fluid collection or abscess. Per Int Cards, no intervention can be done  - Long h/o PICC line issues. Blood cultures clear x 9d. IR placed tunneled cath for home IV antibiotics yesterday  - D/C home with home health and close clinic f/u    Foot pain, left  Monday developed acute pain in the L midfoot. Very tender to palpation on the dorsum of the L midfoot. Per patient too painful to even put bear weight. Xray -kenneth for acute changes (not concerning for fracture or osteo). Possibly atypical presentation of acute gout flare?  - Pain improving with Colchicine      COVID-19  -COVID-19 positive 3/15  - currently breathing comfortably room air  - consideration of monoclonal antibody referral  - supportive care      Sepsis  -See fever    Complication involving left ventricular assist device (LVAD)  Procedure: Device Interrogation Including analysis of device parameters  Patient continues to remove DLES dressing to scratch DLES  Current Settings: Ventricular Assist Device  Review of device function is stable/unstable    TXP LVAD INTERROGATIONS 1/21/2022 1/21/2022 1/21/2022 1/20/2022 1/20/2022 1/20/2022 1/19/2022   Type HeartMate3 HeartMate3 HeartMate3 HeartMate3 HeartMate3 HeartMate3 HeartMate3   Flow 4.6 4.4 4.3 4.7 4.7 4.75 4.8   Speed 5300 5300 5300 5300 5350 5300 5300   PI 4.3 4.8 5.0 3.7 4.1 3.1 3.3   Power (Centeno) 3.9 3.8 3.8 3.8 3.8 3.0 3.9   LSL 4900 4900 4900 4900 4900 4900 -   Pulsatility No Pulse - - - Intermittent pulse Intermittent pulse Intermittent pulse       MRSA bacteremia  -See fever    LVAD (left ventricular assist device)  present  -S/P DT HM3 with MVR 2/2/20  -Current speed 5300  -INR goal 1.5-2.0 (hx of ICH). INR remains 1.4. PharmD to dose Coumadin, and he will have INR checked on Monday, 1/24  -LDH stable  -ECHO 1/11 at 5300 rpm: LVEDD 6.60, RV not well visualized, interval degeneration of MV, suspect partial flail posterior leaflet, cannot exclude significant MR, mild TR, IVC 15, BRYAN intermittently and septum midline  -Diuresing only prn      Procedure: Device Interrogation Including analysis of device parameters  Current Settings: Ventricular Assist Device  Review of device function is stable      TXP LVAD INTERROGATIONS 1/21/2022 1/21/2022 1/21/2022 1/20/2022 1/20/2022 1/20/2022 1/19/2022   Type HeartMate3 HeartMate3 HeartMate3 HeartMate3 HeartMate3 HeartMate3 HeartMate3   Flow 4.6 4.4 4.3 4.7 4.7 4.75 4.8   Speed 5300 5300 5300 5300 5350 5300 5300   PI 4.3 4.8 5.0 3.7 4.1 3.1 3.3   Power (Centeno) 3.9 3.8 3.8 3.8 3.8 3.0 3.9   LSL 4900 4900 4900 4900 4900 4900 -   Pulsatility No Pulse - - - Intermittent pulse Intermittent pulse Intermittent [place] [id number]  Heart Transplant  [facility] - Cardiology Stepdown

## 2022-01-21 NOTE — ASSESSMENT & PLAN NOTE
Procedure: Device Interrogation Including analysis of device parameters  Patient continues to remove DLES dressing to scratch DLES  Current Settings: Ventricular Assist Device  Review of device function is stable/unstable    TXP LVAD INTERROGATIONS 1/21/2022 1/21/2022 1/21/2022 1/20/2022 1/20/2022 1/20/2022 1/19/2022   Type HeartMate3 HeartMate3 HeartMate3 HeartMate3 HeartMate3 HeartMate3 HeartMate3   Flow 4.6 4.4 4.3 4.7 4.7 4.75 4.8   Speed 5300 5300 5300 5300 5350 5300 5300   PI 4.3 4.8 5.0 3.7 4.1 3.1 3.3   Power (Centeno) 3.9 3.8 3.8 3.8 3.8 3.0 3.9   LSL 4900 4900 4900 4900 4900 4900 -   Pulsatility No Pulse - - - Intermittent pulse Intermittent pulse Intermittent pulse

## 2022-01-21 NOTE — PROGRESS NOTES
01/21/2022  Miko Rivera    Current provider:  Fernandez Gr Jr.,*    TXP LVAD INTERROGATIONS 1/21/2022 1/21/2022 1/20/2022 1/20/2022 1/20/2022 1/19/2022 1/19/2022   Type HeartMate3 HeartMate3 HeartMate3 HeartMate3 HeartMate3 HeartMate3 HeartMate3   Flow 4.4 4.3 4.7 4.7 4.75 4.8 4.7   Speed 5300 5300 5300 5350 5300 5300 5300   PI 4.8 5.0 3.7 4.1 3.1 3.3 3.8   Power (Centeno) 3.8 3.8 3.8 3.8 3.0 3.9 3.9   LSL 4900 4900 4900 4900 4900 - -   Pulsatility - - - Intermittent pulse Intermittent pulse Intermittent pulse Intermittent pulse          Rounded on Saba A Oren to ensure all mechanical assist device settings (IABP or VAD) were appropriate and all parameters were within limits.  I was able to ensure all back up equipment was present, the staff had no issues, and the Perfusion Department daily rounding was complete.      For implantable VADs: Interrogation of Ventricular assist device was performed with analysis of device parameters and review of device function. I have personally reviewed the interrogation findings and agree with findings as stated.     In emergency, the nursing units have been notified to contact the perfusion department either by:  Calling [phone] from 630am to 4pm Mon thru Fri, or by contacting the hospital  from 4pm to 630am and on weekends and asking to speak with the perfusionist on call.    9:17 AM

## 2022-01-21 NOTE — PROGRESS NOTES
Discharge   LCSW called [facility]  ([phone])  and spoke to Celine to let her know that Pt will be discharging today. Referal and orders sent on 1/19/22. LCSW called Pt's room who states he needs a ride home but has no other additional needs or questions. FRANCISCO J Paz placed transportation request. SW providing ongoing psychosocial, counseling, & emotional support, education, resources, assistance, and discharge planning as indicated.  SW to continue to follow.

## 2022-01-21 NOTE — ANESTHESIA POSTPROCEDURE EVALUATION
Anesthesia Post Evaluation    Patient: Saba Lott    Procedure(s) Performed: * No procedures listed *    Final Anesthesia Type: general      Patient location during evaluation: PACU  Patient participation: Yes- Able to Participate  Level of consciousness: awake and alert  Post-procedure vital signs: reviewed and stable  Pain management: adequate  Airway patency: patent    PONV status at discharge: No PONV  Anesthetic complications: no      Cardiovascular status: blood pressure returned to baseline  Respiratory status: unassisted  Hydration status: euvolemic  Follow-up not needed.          Vitals Value Taken Time   BP 84/0 01/21/22 0756   Temp 37 °C (98.6 °F) 01/21/22 0756   Pulse 83 01/21/22 0756   Resp 18 01/21/22 0756   SpO2 95 % 01/21/22 0828         No case tracking events are documented in the log.      Pain/Vicente Score: No data recorded

## 2022-01-21 NOTE — PLAN OF CARE
Patient free from falls and injuries throughout shift.  AAO and VSS.  Patient denies chest pain and SOB.  Patient IV Abx.  LVAD working WNL; dressing change due 01/21/2022.  No acute events overnight. Patient resting well at this time.  Plan of care discussed with patient.  Patient verbalizes understanding.  Will continue to monitor.

## 2022-01-21 NOTE — ANESTHESIA PROCEDURE NOTES
Arterial    Diagnosis: PICC placement, Need for IV access    Patient location during procedure: done in OR    Staffing  Authorizing Provider: Hugo Garcia MD  Performing Provider: Hugo Garcia MD    Anesthesiologist was present at the time of the procedure.    Preanesthetic Checklist  Completed: patient identified, IV checked, site marked, risks and benefits discussed, surgical consent, monitors and equipment checked, pre-op evaluation, timeout performed and anesthesia consent givenArterial  Skin Prep: chlorhexidine gluconate and isopropyl alcohol  Orientation: left  Location: radial    Catheter Size: 20 G  Catheter placement by Ultrasound guidance. Heme positive aspiration all ports.  Vessel Caliber: small, patent  Needle advanced into vessel with real time Ultrasound guidance.Insertion Attempts: 2  Assessment  Dressing: secured with tape and tegaderm  Patient: Tolerated well

## 2022-01-21 NOTE — NURSING
Patient is ready for discharge. Patient stable alert and oriented. IVs and removed. No complaints of pain. Discharge papers given @ bedside. Discussed discharge plan. Reviewed medications and side effects, appointments, and answered questions with patient and family. RX sent to pt's home pharmacy. Pt left unit via wheelchair with ambulance. VAD equipment reconciled before discharge. Home infusion Rn gave pt's med @ bedside. Extensors placed as ordered by home infusion rn.

## 2022-01-21 NOTE — DISCHARGE SUMMARY
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 Patient is short of breath and uncomfortable at the time of giving history. He reports chills, weakness, cough, sputum production, weight gain, nausea, vomiting and trouble breathing. He went to his PCP office yesterday where his temp was 103 and was sent to the hospital afterwards. His white count is 20 on arrival. He is unable to lay flat. His HR is 102-142 sinus. He needs to be diuresed upon arrival but K is 2.9, ordered IV and PO Kcl, he vomited out oral potassium, IV replacement will be done.     * No surgery found *     Hospital Course: He was admitted and found to have MRSA bacteremia and Pseudomonas from a small new open epigastric wound. Admitted to noncompliance with suppressive Doxy PTA. ID followed patient carefully, and antibiotics were adjusted. CT c/a/p on 1/2 showed no abscess. TTE on 1/11 with worsened MR but no vegetations. 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 Int Cards was consulted - no intervention needed. Incidental finding of asymptomatic Covid on 1/16. Coumadin held and had to get Vit K a couple of times for INR 3 or greated (goal 1.5-2.0 given recent ICH). Blood cxs cleared as of 1/3, and a tunneled cath was placed for home IV antibiotics. He had  PT/OT. Discharged home on IV Zosyn and Dapto with estimated end date 2/14. We will try to coordinate a [facility] clinic visit for the same date. Palliative Care continues to see patient. He is not interested in DNR/DNI or Hospice at this time      Consults (From admission, onward)        Status Ordering Provider     Inpatient consult to Midline team  Once        Provider:  (Not yet assigned)    Completed MADELINE CHRIS JR     Inpatient consult to Interventional Radiology  Once        Provider:  (Not yet assigned)    Completed CATIA GIRALDO     Inpatient consult to Midline team  Once        Provider:  (Not yet assigned)    Completed GEORGE GARCIA     Inpatient consult to Palliative Care  Once        Provider:  (Not yet assigned)    Completed DANILO MCNULTY     Inpatient consult to Midline team  Once        Provider:  (Not yet assigned)    Completed INOCENCIO MARTINEZ     Inpatient consult to Infectious Diseases  Once        Provider:  (Not yet assigned)    Completed DANILO MCNULTY     Inpatient consult to Registered Dietitian/Nutritionist  Once        Provider:  (Not yet assigned)    Completed JASEN SANTILLAN          Significant Diagnostic Studies: See above    Pending Diagnostic Studies:     Procedure Component Value Units Date/Time    Basic metabolic panel [[id number]] Collected: 01/01/22 1114    Order Status: Sent Lab Status: In process Updated: 01/01/22 1114    Specimen: Blood     Basic metabolic panel [[id number]] Collected: 01/01/22 1114    Order Status: Sent Lab Status: In process Updated: 01/01/22 1114    Specimen: Blood     COVID-19 Routine Screening [[id number]] Collected: 01/15/22 1339    Order Status: Sent Lab Status: In process Updated: 01/15/22 1339    Specimen: Nasopharyngeal         Final Active Diagnoses:    Diagnosis Date Noted POA    PRINCIPAL PROBLEM:  Fever [R50.9] 10/05/2021 Yes    Foot pain, left [M79.672] 01/18/2022 No    COVID-19 [U07.1] 01/16/2022 Unknown    Sepsis  [A41.9]  Yes    Complication involving left ventricular assist device (LVAD) [T82.9XXA] 08/05/2021 Yes    MRSA bacteremia [R78.81, B95.62] 02/19/2021 Yes    LVAD (left ventricular assist device) present [Z95.811] 02/06/2020 Not Applicable      Problems Resolved During this Admission:    Diagnosis Date Noted Date Resolved POA    ADHD [F90.9] 01/01/2022 01/01/2022 No    Hypokalemia [E87.6] 01/01/2022 01/10/2022 Yes    Acute decompensated heart failure [I50.9]  01/02/2022 Yes      Discharged Condition: stable    Disposition: Home-Health Care Svc    Follow Up:   Follow-up Information     DOMINGO Garner, ANP On 2/14/2022.    Specialty: Infectious Diseases  Contact information:  [address] [place] [id number]  [phone]             [facility], CARDIOVASCULAR SURGERY On 2/14/2022.                     Patient Instructions:      Diet Cardiac   Order Comments: Coumadin dietary restrictions     Notify your health care provider if you experience any of the following:  temperature >100.4     Notify your health care provider if you experience any of the following:  persistent nausea and vomiting or diarrhea     Notify your health care provider if you experience any of the following:  severe uncontrolled pain     Notify your health care provider if you experience any of the following:  redness, tenderness, or signs of infection (pain, swelling, redness, odor or green/yellow discharge around incision site)     Notify your health care provider if you experience any of the following:  difficulty breathing or increased cough     Notify your health care provider if you experience any of the following:  severe persistent headache     Notify your health care provider if you experience any of the following:  worsening rash     Notify your health care provider if you experience any of the following:  persistent dizziness, light-headedness, or visual disturbances     Notify your health care provider if you experience  any of the following:  increased confusion or weakness     Activity as tolerated     Medications:  Reconciled Home Medications:      Medication List      START taking these medications    dextrose 5 % SolP 100 mL with piperacillin-tazobactam 4.5 gram SolR 4.5 g  Inject 4.5 g into the vein once daily.     LIDOcaine 5 %  Commonly known as: LIDODERM  Place 1 patch onto the skin once daily. Remove & Discard patch within 12 hours or as directed by MD ALBRECHT 0.6 mg Cap  Generic drug: colchicine  Take 1 capsule (0.6 mg total) by mouth once daily.  Start taking on: January 22, 2022     ondansetron 8 MG Tbdl  Commonly known as: ZOFRAN-ODT  Take 1 tablet (8 mg total) by mouth every 6 (six) hours as needed (nausea).     sodium chloride 0.9% SolP 50 mL with DAPTOmycin 350 mg SolR 1,050 mg  Inject 1,050 mg into the vein once daily.     triamcinolone acetonide 0.025% 0.025 % cream  Commonly known as: KENALOG  Apply topically 2 (two) times daily.        CHANGE how you take these medications    magnesium oxide 400 mg (241.3 mg magnesium) tablet  Commonly known as: MAG-OX  Take 2 tablets (800 mg total) by mouth 3 (three) times daily.  What changed:   · how much to take  · when to take this     warfarin 7.5 MG tablet  Commonly known as: COUMADIN  Take 3.75mg orally daily  What changed: additional instructions        CONTINUE taking these medications    amLODIPine 10 MG tablet  Commonly known as: NORVASC  Take 1 tablet (10 mg total) by mouth once daily.     atorvastatin 20 MG tablet  Commonly known as: LIPITOR  Take 1 tablet (20 mg total) by mouth every evening.     docusate sodium 100 MG capsule  Commonly known as: DOK  TAKE ONE CAPSULE BY MOUTH TWICE DAILY AS NEEDED FOR CONSTIPATION     hydrALAZINE 100 MG tablet  Commonly known as: APRESOLINE  Take 1 tablet (100 mg total) by mouth every 8 (eight) hours.     Lactobacillus rhamnosus GG 10 billion cell capsule  Commonly known as: CULTURELLE  Take 1 capsule by mouth daily as  needed (as needed).     lisinopriL 5 MG tablet  Commonly known as: PRINIVIL,ZESTRIL  TAKE 1 TABLET BY MOUTH ONCE DAILY     VENTOLIN HFA 90 mcg/actuation inhaler  Generic drug: albuterol  Inhale 2 puffs into the lungs every 6 (six) hours as needed. Rescue        STOP taking these medications    bumetanide 1 MG tablet  Commonly known as: BUMEX     doxycycline 100 MG Cap  Commonly known as: VIBRAMYCIN     spironolactone 25 MG tablet  Commonly known as: ALDACTONE            Mallika Lugo NP  Heart Transplant  [facility] - Cardiology Stepdown

## 2022-01-21 NOTE — ASSESSMENT & PLAN NOTE
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 INR remains 1.4. PharmD to dose Coumadin, and he will have INR checked on Monday, 1/24  -LDH stable  -ECHO 1/11 at 5300 rpm: LVEDD 6.60, RV not well visualized, interval degeneration of MV, suspect partial flail posterior leaflet, cannot exclude significant MR, mild TR, IVC 15, BRYAN intermittently and septum midline  -Diuresing only prn      Procedure: Device Interrogation Including analysis of device parameters  Current Settings: Ventricular Assist Device  Review of device function is stable      TXP LVAD INTERROGATIONS 1/21/2022 1/21/2022 1/21/2022 1/20/2022 1/20/2022 1/20/2022 1/19/2022   Type HeartMate3 HeartMate3 HeartMate3 HeartMate3 HeartMate3 HeartMate3 HeartMate3   Flow 4.6 4.4 4.3 4.7 4.7 4.75 4.8   Speed 5300 5300 5300 5300 5350 5300 5300   PI 4.3 4.8 5.0 3.7 4.1 3.1 3.3   Power (Centeno) 3.9 3.8 3.8 3.8 3.8 3.0 3.9   LSL 4900 4900 4900 4900 4900 4900 -   Pulsatility No Pulse - - - Intermittent pulse Intermittent pulse Intermittent pulse

## 2022-01-21 NOTE — PLAN OF CARE
Patient remained free of injury, trauma, or falls during  shift.   Patient is alert  and oriented x 4. Patient up with assistance to bedside commode has had x 2 loose BM during shift.   Patient had tunnel cath placed to right upper chest. Dressing is clean, Dry and intact. Patient denies pain to site.  Care plan reviewed with patient, questions encouraged.   Vitals remained stable throughout shift, will continue to monitor.

## 2022-01-21 NOTE — PROGRESS NOTES
DISCHARGE NOTE:    Saba Lott is a 56 y.o. male s/p HM3 [facility]ad [facility] with h/o of ICH secondary to a mycotic aneurysm and VISA bacteremia was admitted for evaluation of cough, n/v and fever.     Past Medical History:   Diagnosis Date    Acute decompensated heart failure     Encounter for blood transfusion     Hypokalemia 1/1/2022    Left ventricular assist device complication 8/5/2021    LVAD (left ventricular assist device) present 2020    Presence of left ventricular assist device (LVAD)        Hospital Course: During his hospital stay Mr. Lott was evaluated by the ID team for persistent fevers. He was started on daptomycn for MRSA bacteremia and zosyn for pseudomonal DLI. Plan to continue for 6 weeks with EOT 2/14/22.   Will resume suppressive doxycycline after completion of IV abx.     Mr. Lott's INR was liabile during this admission thought to be due to decreased oral intake. He was administered 2 doses of iv vit k for rapidly increasing inr. His inr today is 1.4 following resumption of warfarin 3mg yesterday. Plan to discharge him on 3.75mg daily (7.5mg 1/2 tablet).     Pharmacy Interventions/Recommendations:  1) INR Goal: 1.5-2    2) Antiplatelet Agents: None    3) Heparin Bridging:  No bridge for h/o major bleeding    4) INR Follow-Up/Discharge Needs:  Monday with coumadin clinic     See list of discharge medication for dosing instructions.     Saba Lott and his caregiver verbalized their understanding and had the opportunity to ask questions.      Discharge Medications:     Medication List      START taking these medications    dextrose 5 % SolP 100 mL with piperacillin-tazobactam 4.5 gram SolR 4.5 g  Inject 4.5 g into the vein once daily.     LIDOcaine 5 %  Commonly known as: LIDODERM  Place 1 patch onto the skin once daily. Remove & Discard patch within 12 hours or as directed by MD ALBRECHT 0.6 mg Cap  Generic drug: colchicine  Take 1 capsule (0.6 mg total) by mouth once  daily.  Start taking on: January 22, 2022     ondansetron 8 MG Tbdl  Commonly known as: ZOFRAN-ODT  Take 1 tablet (8 mg total) by mouth every 6 (six) hours as needed (nausea).     sodium chloride 0.9% SolP 50 mL with DAPTOmycin 350 mg SolR 1,050 mg  Inject 1,050 mg into the vein once daily.     triamcinolone acetonide 0.025% 0.025 % cream  Commonly known as: KENALOG  Apply topically 2 (two) times daily.        CHANGE how you take these medications    magnesium oxide 400 mg (241.3 mg magnesium) tablet  Commonly known as: MAG-OX  Take 2 tablets (800 mg total) by mouth 3 (three) times daily.  What changed:   · how much to take  · when to take this     warfarin 7.5 MG tablet  Commonly known as: COUMADIN  Take 3.75mg orally daily  What changed: additional instructions        CONTINUE taking these medications    amLODIPine 10 MG tablet  Commonly known as: NORVASC  Take 1 tablet (10 mg total) by mouth once daily.     atorvastatin 20 MG tablet  Commonly known as: LIPITOR  Take 1 tablet (20 mg total) by mouth every evening.     docusate sodium 100 MG capsule  Commonly known as: DOK  TAKE ONE CAPSULE BY MOUTH TWICE DAILY AS NEEDED FOR CONSTIPATION     hydrALAZINE 100 MG tablet  Commonly known as: APRESOLINE  Take 1 tablet (100 mg total) by mouth every 8 (eight) hours.     Lactobacillus rhamnosus GG 10 billion cell capsule  Commonly known as: CULTURELLE  Take 1 capsule by mouth daily as needed (as needed).     lisinopriL 5 MG tablet  Commonly known as: PRINIVIL,ZESTRIL  TAKE 1 TABLET BY MOUTH ONCE DAILY     VENTOLIN HFA 90 mcg/actuation inhaler  Generic drug: albuterol  Inhale 2 puffs into the lungs every 6 (six) hours as needed. Rescue        STOP taking these medications    bumetanide 1 MG tablet  Commonly known as: BUMEX     doxycycline 100 MG Cap  Commonly known as: VIBRAMYCIN     spironolactone 25 MG tablet  Commonly known as: ALDACTONE           Where to Get Your Medications      These medications were sent to [facility] [address] [address]    Hours: Mon-Fri 7a-7p, Sat-Sun 10a-4p Phone: [phone]   · LIDOcaine 5 %  · magnesium oxide 400 mg (241.3 mg magnesium) tablet  · MITIGARE 0.6 mg Cap  · ondansetron 8 MG Tbdl  · triamcinolone acetonide 0.025% 0.025 % cream  · warfarin 7.5 MG tablet     Information about where to get these medications is not yet available    Ask your nurse or doctor about these medications  · dextrose 5 % SolP 100 mL with piperacillin-tazobactam 4.5 gram SolR 4.5 g  · sodium chloride 0.9% SolP 50 mL with DAPTOmycin 350 mg SolR 1,050 mg

## 2022-01-23 NOTE — PROGRESS NOTES
C3 nurse spoke with Saba Lott for a TCC post hospital discharge follow up call. The patient has a scheduled HOSFU appointment with Dr. Rowan on 2/14/2022 @ 1:00 pm.

## 2022-01-23 NOTE — PATIENT INSTRUCTIONS
Patient Education       Fever Discharge Instructions, Adult   About this topic   Fever is an increase of the body's temperature. Many things can cause a fever. Fever in adults is often caused by a virus. Antibiotics will not help treat a virus. Most of the time, your fever will go away in a few days.  What care is needed at home?   · Ask your doctor what you need to do when you go home. Make sure you ask questions if you do not understand what the doctor says.  · Drink lots of water, juice, or broth to replace fluids lost from the fever.  · Dress in lightweight clothes. Use a sheet or light blanket if you are cold.  · You may want to take medicine like ibuprofen, naproxen, or acetaminophen to help with fever.  · Stay at home until the fever is gone for 24 hours without the use of fever reducing medicines. If you had an infection, this will help prevent it from spreading to other people.  · Wash your hands often. This will help keep others healthy.     What follow-up care is needed?   Your doctor may ask you to make visits to the office to check on your progress. Be sure to keep these visits.  What drugs may be needed?   The doctor may order drugs to:  · Lower fever  · Treat the problem causing the fever  Will physical activity be limited?   · If your fever is caused by an infection, stay at home until the fever is gone for 24 hours. This will help prevent the infection from spreading to other people.  · Get lots of rest. Sleep when you are feeling tired. Avoid doing tiring activities.  What problems could happen?   · Loss of body fluids  What can be done to prevent this health problem?   · Wash your hands often with soap and water for at least 20 seconds, especially after coughing or sneezing. Alcohol-based hand sanitizers also work to kill viruses.       When do I need to call the doctor?   · Have a fever of 100.4°F (38°C) or higher and other symptoms like:  ? Trouble breathing.  ? Severe headache and neck  stiffness.  ? Confusion or seeing things that are not there.  ? Seizure.  · You have signs of severe fluid loss, such as:  ? No urine for more than 8 hours.  ? You feel very light-headed or like you are going to pass out.  ? You feel weak like you are going to fall.  · You have a fever of 100.4°F (38°C) or higher that lasts for several days or keeps coming back.  · You develop early signs of fluid loss, such as:  ? Dark-colored urine  ? Dry mouth  ? Muscle cramps  ? Lack of energy  ? Feeling light-headed when you get up     · Fever that does not respond to anti-fever drugs  · A high fever between 103°F to 105°F (39.5°C to 40.5°C)  · Problems breathing  · A new rash  · Belly pain that keeps you from eating or sleeping  · Throwing up more than 3 times in the next 48 hours  · You are not feeling better in 2 to 3 days or you are feeling worse  Teach Back: Helping You Understand   The Teach Back Method helps you understand the information we are giving you. After you talk with the staff, tell them in your own words what you learned. This helps to make sure the staff has described each thing clearly. It also helps to explain things that may have been confusing. Before going home, make sure you can do these:  · I can tell you about my condition.  · I can tell you what I can do to help avoid passing the infection to others.  · I can tell you what I will do if I have dark colored or no urine, dry mouth, cracked lips, or a lack of energy.  Where can I learn more?   [facility]  https://www.health.govt.nz/your-health/conditions-and-treatments/diseases-and-illnesses/fever/fever-adults   [facility]  https://www.nhsinform.scot/illnesses-and-conditions/infections-and-poisoning/fever-in-adults   Last Reviewed Date   2021-08-16  Consumer Information Use and Disclaimer   This information is not specific medical advice and does not replace information you receive from your health care provider. This is only a brief summary of  general information. It does NOT include all information about conditions, illnesses, injuries, tests, procedures, treatments, therapies, discharge instructions or life-style choices that may apply to you. You must talk with your health care provider for complete information about your health and treatment options. This information should not be used to decide whether or not to accept your health care providers advice, instructions or recommendations. Only your health care provider has the knowledge and training to provide advice that is right for you.  Copyright   Copyright © 2021 UpToDate, Inc. and its affiliates and/or licensors. All rights reserved.    [facility] teaching reviewed with Saba Lott . He verbalized understanding.    Education was provided based on the patient's discharge diagnosis using the attached Lizette patient education as a reference.

## 2022-01-24 NOTE — PHYSICIAN QUERY
PT Name: Saba Lott  MR #: [id number]     DOCUMENTATION CLARIFICATION      CDS/: JESSICA Gomez, RN, CCDS               Contact information: [email or web address]  This form is a permanent document in the medical record.     Query Date: January 24, 2022    By submitting this query, we are merely seeking further clarification of documentation.  Please utilize your independent clinical judgment when addressing the question(s) below.     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      Source unclear, as symptoms are nonspecific.     Blood cultures of 1/1 - 2/4 bottles + GPC, ID pending. Blood cultures 1/2 - NGTD    CT A/P 1/2 - unchanged small region of soft tissue induration and slight skin thickening about the drive line in anterior abdominal wall. No definite focal fluid collection about the LVAD.  Noted patchy LLL GGO and airspace opacities with additional patchy ground glass attenuation in DEVORAH  Slight non-specific perinephric fat stranding - U/A wnl     Last positive BCx 1/2 for Staph, all repeats NGTD. Abdominal wound culture 1/3 positive for pseudomonas  - Remains febrile, TMax 100.8 F  - ID following, vanc changed to dapto 1/1 given hx of VISA. Cefepime  changed to jacoby 1/2 and then jacoby changed to Zosyn 1/6     MRSA bacteremia  Leukocytosis improving (21K down to 16K. Tmax 100.8. patient states he feels better today     Pan sensitive pseudomonas on ABD wound cx but no active sign of infection at DLES and no cultures done  Concern that given MRSA bacteremia, may have endovascular infection if thrombus present in SMV  Epigastric wound healed     COVID-19 positive 3/15  MRSA bacteremia in VAD patient.  Pseudomonal wound infection H & P: Dr. Tafoya 1/1                    ID Consult: Dr. Eldridge 1/1              ID PN: Dr. Eldridge 1/3                    HT PN: Dr. Floyd 1/7           ID PN: Dr. Kim 1/7        ID PN: Dr. Guerrier 1/11           ID PN: Dr. Guerrier 1/14     Please document your best medical opinion regarding the etiology of __sepsis_____?       [   ] Abdominal wound   [   ] LVAD   [   ] Other etiology (please specify):___________________   [  ] Clinically Undetermined     Please document in your progress notes daily for the duration of treatment, until resolved, and include in your discharge summary.

## 2022-01-24 NOTE — TELEPHONE ENCOUNTER
----- Message from Carmelo Ruth sent at 1/24/2022  2:35 PM CST -----  Regarding: Refill  Patient is calling for refill of albuterol (PROVENTIL/VENTOLIN HFA) 90     Call: [phone] ([facility] - [place], [place] - [address] AT [facility] [address] [place] [address]  Phone: [phone] Fax: [phone]

## 2022-01-25 NOTE — PHYSICIAN QUERY
PT Name: Saba Lott  MR #: [id number]     DOCUMENTATION CLARIFICATION      CDS/: JESSICA Gomez, RN, CCDS               Contact information: [email or web address]  This form is a permanent document in the medical record.     Query Date: January 25, 2022    By submitting this query, we are merely seeking further clarification of documentation.  Please utilize your independent clinical judgment when addressing the question(s) below.     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      Source unclear, as symptoms are nonspecific.     Blood cultures of 1/1 - 2/4 bottles + GPC, ID pending. Blood cultures 1/2 - NGTD    CT A/P 1/2 - unchanged small region of soft tissue induration and slight skin thickening about the drive line in anterior abdominal wall. No definite focal fluid collection about the LVAD.  Noted patchy LLL GGO and airspace opacities with additional patchy ground glass attenuation in DEVORAH  Slight non-specific perinephric fat stranding - U/A wnl     Last positive BCx 1/2 for Staph, all repeats NGTD. Abdominal wound culture 1/3 positive for pseudomonas  - Remains febrile, TMax 100.8 F  - ID following, vanc changed to dapto 1/1 given hx of VISA. Cefepime  changed to jacoby 1/2 and then jacoby changed to Zosyn 1/6     MRSA bacteremia  Leukocytosis improving (21K down to 16K. Tmax 100.8. patient states he feels better today     Pan sensitive pseudomonas on ABD wound cx but no active sign of infection at DLES and no cultures done  Concern that given MRSA bacteremia, may have endovascular infection if thrombus present in SMV  Epigastric wound healed     COVID-19 positive 3/15  MRSA bacteremia in VAD patient.  Pseudomonal wound infection H & P: Dr. Tafoya 1/1                    ID Consult: Dr. Eldridge 1/1              ID PN: Dr. Eldridge 1/3                    HT PN: Dr. Floyd 1/7           ID PN: Dr. Kim 1/7        ID PN: Dr. Guerrier 1/11           ID PN: Dr. Guerrier 1/14     Please document your best medical opinion regarding the etiology of __sepsis____? Please select answer prior to signing       [  x ] Abdominal wound   [   ] LVAD   [   ] Other etiology (please specify):___________________   [  ] Clinically Undetermined             Please document in your progress notes daily for the duration of treatment, until resolved, and include in your discharge summary.

## 2022-01-27 NOTE — TELEPHONE ENCOUNTER
Returned phone call and it was reported that patient is SOB, weak and vomited on himself. Asked if there was any alarms from the LVAD which they denied. Educated to page the LVAD  Coordinator on call.

## 2022-01-27 NOTE — TELEPHONE ENCOUNTER
Received labs:  CPK 1086 (down from 3474)  Creatinine .8  AST/ALT 56/35  Hgb 7.8  Hct 26.8  WBC 11.9    Daptomycin had been held yesterday.   Call to patioent- advised by mother he has gone to the ED - [facility] ? -  with SOB and weakness   Message to VAD coordinator

## 2022-01-27 NOTE — TELEPHONE ENCOUNTER
----- Message from Erick Albert sent at 1/27/2022  2:49 PM CST -----  Pt wife calling to speak w/ nurse. Stating pt is feeling very weak.    [phone]

## 2022-01-27 NOTE — TELEPHONE ENCOUNTER
Family member reports pt is very weak & feels nausea. Pt answered yes to severe difficulty breathing, advised pt & family member need to call 911 now, verbalized understanding.    Reason for Disposition   SEVERE difficulty breathing (e.g., struggling for each breath, speaks in single words)    Protocols used: WEAKNESS (GENERALIZED) AND FATIGUE-A-OH

## 2022-01-28 NOTE — TELEPHONE ENCOUNTER
Patient was released from local ED  He reports he feels better today.   His appetite has been poor, but he ate more today (grits, sausage, eggs, soup). He bought some ensure.   Denies myalgias, arthralgias. Denies fevers, chills, sweats  No nausea or vomiting today  Denies drainage from DLES. No pain along driveline     He has held daptomycin - CK went from 3474>1086.  Asymptomatic. Creatinine at baseline. He has also been on atorvastatin  Discussed with ID PharmD and ID staff   Will resume daptomycin but decrease dose from 10mg/kg to 8 mg/kg q 24 hours starting tomorrow.  Stop atorvastatin.  Discussed with Heart Failure  Repeat labs tomorrow and Monday.  [facility] will communicate labs to me tomorrow    Patient verbally repeated instructions.    Advised that if he develops muscle pain, fevers, chills he is to call the VAD coordinator on call.

## 2022-01-29 NOTE — TELEPHONE ENCOUNTER
Labs of today received    Creat 1.0  He is to resume daptomycin  8 mg/kg today and stop atorvastatin   Repeat labs next week

## 2022-01-31 NOTE — PROGRESS NOTES
INR just slightly below goal. Medications, chart, and patient findings reviewed. Will not boost given ICH history. Will retry dose & recheck on Wednesday. See calendar for adjustments to dose and follow up plan.

## 2022-02-01 NOTE — TELEPHONE ENCOUNTER
Labs of 1/31  VAD patient on daptomycin  Mg/kg and zosyn   (normalized)  Creat .9  LFTs wnl   WBC wnl  H/H7.4/24  CRP 4.95 mg/dl    Labs forwarded to VAD team for review- H/H low, though approx near baseline at d/c.

## 2022-02-08 NOTE — TELEPHONE ENCOUNTER
Labs of 2/7/22  VAD patient on daptomycin  8 mg/kg and zosyn  CK 35 (normalized)  Creat .8  LFTs wnl   WBC wnl  H/H  7.2/24.8  CRP 4.95 mg/dl>>6.91 mg/dl  (uptrending.  106.2 mg/l at d/c )    Labs forwarded to VAD team for review.  - H/H low  ID follow up 2/14 to see with VAD

## 2022-02-10 NOTE — TELEPHONE ENCOUNTER
Returned call from  regarding message the other day that Saba is not regularly administering his antibiotics.  He had multiple  balls of antibiotics  She reports that his VAD dressing is often completely off   He does not seem to comprehend importance of adhering to antibiotic schedule or keeping clean dressing.   He has follow up .   I believe he is due to end therapy at that time.   I called patient.  He denies fevers, chills, sweats. Denies any complaints except poor appetite, but he is eating. He tells me he is taking his antibiotics as directed.  I reminded him of his upcoming appointment on  and he says he'll be there.   Will discuss further with ID staff and with VAD team   Poor candidate for continued IV OPAT

## 2022-02-11 NOTE — TELEPHONE ENCOUNTER
Called pt regarding scheduling 6mw when he returns to clinic on 2/14.  Pt stated that he has been weak and will be unable to initiate 6mw, at this time.

## 2022-02-14 PROBLEM — H57.02 ANISOCORIA: Status: ACTIVE | Noted: 2022-01-01
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
## 2022-02-14 NOTE — NURSING
Pt seen with family at bedside. MAP 40s with HR . VAD alarming Low Flow Alarm for 915 minutes. Flow now at 1.0.     I spoke with patient's family including mom, daughters and sister. All agreeable to turn off LVAD. Morphine and Propofol have been infusing for >2 hours. CONG Peterson at bedside. OK to turn off LVAD.    LVAD turned off at 14:45. Pt remains with MAP 45 and HR . ICU RN is aware.     Informed CONG Velázquez and Dr. Gr.    -Tx with course of antibiotics

## 2022-02-14 NOTE — NURSING
[facility] Pt switched over to wall power. Flows 0.4-0.5. LVAD equipment inventoried. Cuff BP 72/47 MAP 52, DP 58 & 54. Dr. Zamudio at bedside. Levophed started. Prop 30, turned off to check neuro status, cough reflex present, gag reflex present, corneal reflex present, not withdrawaling to pain. Pupils uneven, R 6mm L 3mm, STAT CT ordered. LVAD coordinator notified. BG 80, rechecked after 1/2 amp D50 administered in flight for BG 55.     Pt transported to CT scan by RN, charge nurse, and RT. NP from neuro team and Dr. Zamudio present. Pupils both enlarged while pt at CT, verfied by NP. CT and CXR complete. Pt transported back to room. Upon arrival, pupil assessment similar to admission, R pupil enlarged and fixed, L pupil small and sluggish. MD aware.    CXR confirmed placement of tunneled cath, ok to use per Dr. Zamudio. SVO2 29, CVP 28. 80mg IVP Lasix ordered and administered. No urine output.    Pt became diaphoretic, BG rechecked, resulted less than 40. Other 1/2 of D50 amp administered emergently per charge RN and MD. BG rechecked after 15 minutes, resulted 80. 15 minutes later, BG 79. MD aware, passed along to day shift RN. Will start D10 infusion.    Flight RN reported that pt DLES had no dressing on it, she cleaned the site and placed a tegaderm. Will change LVAD dressing using sterile kit.

## 2022-02-14 NOTE — ASSESSMENT & PLAN NOTE
Saba Lott is a 56 y.o. male with a significant medical history of HTN, dilated cardiomyopathy s/p LVAD (on Coumadin), recent admission for sepsis, recent L ICH, hx R PCA stroke who presents to the hospital as a transfer from [facility] for LVAD management.  The patient presented to the OSH complaining of SOB and was subsequently intubated.  On arrival to this facility the patient is noted to have anisocoria, R pupil>L pupil.  NIHSS 30.  CTH is negative for acute findings.    -Given high the NIHSS, the patient's symptoms are not likely due to acute stroke.    -Ideally we would recommend obtaining an MRI brain to further characterize, however the patient has an LVAD in place.  A CTA Head and Neck could be obtained if no improvement in neuro status after addressing metabolic issues or if there is worsening of his condition.  -Avoid hypotension/hypoperfusion  -Hourly neuro checks

## 2022-02-14 NOTE — CONSULTS
ID consulted for antibiotic recs in pt with hx of MRSA bacteremia and pseudomonas DLES infection on daptomycin and zosyn.    Made DNR and transitioned to comfort measures today.  ID will sign off.  Case discussed with Dr. Eldridge.  Yarelis Parsons PGY-5  Infectious Disease

## 2022-02-14 NOTE — CARE UPDATE
FLIGHT CARE TRANSPORT NOTE     Date of Transport: 2022  : 1965  Age: 56 y.o.  Medication Dosing Weight: 90.7kg  Sex: male  Race: Black or     MRN: [id number]  Time Of Patient Handoff: 0340    ASSESSMENT/INTERVENTIONS     This patient was transported by [facility] from the ED of [facility] by [facility]. The patient's overall condition remained unchanged throughout transport, with all vital signs remaining stable per the patient's current baseline. All lines, tubes, and devices remained patent and intact.     LDAs:  7.5 ETT - 24cm @ the lip,  OG,  PCL - R. IJ,  PIV - 20G. R. AC,  Indwelling Mc Catheter,  HM3 LVAD - RLA (No dressing upon initial assessment. Skin cleansed and Tegaderm placed per team)    GTTS:  Propofol gtt @ 30mcg/kg/min.    Was given Fentanyl IVP - 100mcg and Zofran IVP - 4mg en route for agitation/vent synchrony.    POCT CBG prior to transport post K+ shift - 114.  POCT CBG en route per team - 55.  1/2 amp D50 IVP administered per team.    [facility] to Bedside Delfino CANALES without incident. The patient's Personal Belongings (extra batteries/supplies and clothing) and OSH Chart and Diagnostic Disk(s) were left with receiving clinician.     Vital signs prior to FC departure:  HR: 102 - LVAD artifact - intermittent PVCs.  BP: 80/0 Doppler.  SpO2: 100%   RR: 26  EtCO2: 30  Afebrile    Ventilator Settings:   AC,  VT: 400mL,  RR: 20,  FiO2: 60%,  PEEP: 5.0.    LVAD Alarm:  *Low Flow*  Speed: 5300 RPM,  Flow: 0.4 LPM,  PI: 2.1,  Power: 3.0W.    LVAD Supplies:  Main Controller: Serial Number Unreadable,  Clip: [id number],  Clip: [id number],  Battery 2: SN - [id number],  Battery 3: SN - [id number].    Backup Supplies:  Backup Controller: Serial Number Unreadable,  Clip: [id number],  Clip: [id number],  Battery 5: SN - [id number],  Battery 6: SN - [id number].    FOLLOW-UP     Call [facility], Rosie Russo RN at [phone] (Adult Team) or [phone] (Pediatric Team) for additional questions or concerns.

## 2022-02-14 NOTE — PROGRESS NOTES
Pt intubated, not responding.  No family at bedside.  Currently flows 0.9 LPM/Speed 5300 RPMs/ PI 2.9/ Centeno 3.0.  BP 80/57, pulsatile.  Monitor displays low flow alarms >255 minutes.  Controller displays LFA >538 minutes (8.9 hours).  LFA started 2/14/22 0041.  Refer to Dr. Castañeda note from this am.   Secondary site:      LVAD DLES:

## 2022-02-14 NOTE — PROGRESS NOTES
Vascular Neurology consulted upon arrival.  CT head with no acute findings.  Exam findings felt to be unlikely related to an acute cerebrovascular event.  Patient transitioned to comfort care at this time.  If condition changes can consider repeat CT imaging to help determine presence of new acute events.      Vascular Neurology to sign off at this time.  Please reconsult if condition changes and Vascular Neurology is needed.    Mitra Tan, NP-C  Vascular Neurology  [phone]

## 2022-02-14 NOTE — PLAN OF CARE
56-year-old black male previously underwent LVAD placement for dilated cardiomyopathy and stage D congestive heart failure.  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  He is not a candidate for pump exchange or transplant.  He had a left intracerebral hemorrhage as well as a history of a prior right PCA stroke.  He came into the hospital this morning with cardiogenic shock and essentially no flow from his pump (0.5 L) consistent with pump thrombosis.  He also has acute renal failure as one would expect with this degree of cardiogenic shock.    I spoke with Solitario Holrbook (healthcare power of ) and discussed his condition and diagnosis.  I explained that he is not a candidate for further advanced options.  I further explained that I do not believe that he would survive the day.  I discussed with her that he is full code on the chart to see if she would want to implement DNR.  She asked me to call their Mother so I attempted her number as well.  I could not get through as my number blocked so I called Frank back so she could speak to Mother and told her I would try again to reach Mother after giving them an opportunity to speak.  I then called and got voicemail so left Mother message that I delivered to Frank.  I called Frank again to update her and she said his father, mother and brother were driving in this morning to visit with him and did not want to implement DNR.  She gave permission for central lines, arterial line and for us to do anything possible to keep him alive until they could arrive.    Condition is critical and he has no realistic chance of survival.  As a result, no IABP or support measures other than medications will be offered but will certainly work with medications.
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
## 2022-02-14 NOTE — PROGRESS NOTES
02/14/22 0541   Invasive Hemodynamic Monitoring   CVP (mean) 28 mmHg   SVO2 (%) 29 %     Dr. Zamudio at bedside.

## 2022-02-14 NOTE — PROCEDURES
Saba Lott is a 56 y.o. male patient.    Temp: 97.16 °F (36.2 °C) (02/14/22 1400)  Pulse: 101 (02/14/22 1400)  Resp: (!) 30 (02/14/22 1400)  BP: (!) 78/51 (02/14/22 1300)  SpO2: (!) 93 % (02/14/22 1400)  Weight: 90.7 kg (199 lb 15.3 oz) (02/14/22 0514)    Arterial Line    Date/Time: 2/14/2022 2:38 PM  Location procedure was performed: [facility]  Performed by: Juan Urena MD  Authorized by: Juan Urena MD   Consent Done: Emergent Situation  Preparation: Patient was prepped and draped in the usual sterile fashion.  Indications: hemodynamic monitoring  Location: right radial    Anesthesia:  Local Anesthetic: lidocaine 1% with epinephrine    Patient sedated: no  Seldinger technique: Seldinger technique used  Number of attempts: 1  Complications: No  Specimens: No  Implants: No  Post-procedure: line sutured and dressing applied  Post-procedure CMS: normal  Patient tolerance: Patient tolerated the procedure well with no immediate complications          TXP LVAD INTERROGATIONS 2/14/2022 2/14/2022 2/14/2022 2/14/2022 2/14/2022 2/14/2022 2/14/2022   Type HeartMate3 HeartMate3 HeartMate3 HeartMate3 HeartMate3 HeartMate3 HeartMate3   Flow - 0.9 0.9 0.9 1 0.9 0.9   Speed 5600 5600 5600 5650 5600 5650 5300   PI - 2.7 2.6 2.7 2.6 2.7 2.8   Power (Centeno) - 3 3 3 3.1 3.1 3   LSL 4900 4900 4900 4900 4900 4900 4900   Pulsatility Pulse Pulse Pulse Pulse Pulse Pulse Pulse         2/14/2022

## 2022-02-14 NOTE — HPI
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     Pt was admitted in Jan for MRSA bacteremia and Pseudomonas from a small new open epigastric wound. Admitted to noncompliance with suppressive Doxy PTA. ID followed patient carefully, and antibiotics were adjusted. CT c/a/p on 1/2 showed no abscess. TTE on 1/11 with worsened MR but no vegetations. 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 Int Cards was consulted - no intervention needed. Incidental finding of asymptomatic Covid on 1/16. Coumadin held and had to get Vit K a couple of times for INR 3 or greated (goal 1.5-2.0 given recent ICH). Blood cxs cleared as of 1/3, and a tunneled cath was placed for home IV antibiotics. Discharged home on IV Zosyn and Dapto with estimated end date 2/14. Palliative Care continues to see patient. He is not interested in DNR/DNI or Hospice at this time.     Pt is now presenting today to OSH via EMS in respiratory distress. Per EMS pt was SOB for 2-3 hrs prior to arrival and was speaking in one word sentences.     Of note pt is followed by HH and there was concern of non-compliance and that pt was not taking his prescribed ABX.     CT Head at Osh showed a prominent, remote appearing b/l basal ganglia lacunar infarcts with areas of encephalomalacia b/l in occipital lobes and in posterior left parietal lobe. Small area of encephalomalacia vs. Slight edema in anterolateral left frontal lobe. Chronic microvascular change.LA noted to be >10, K 6.7, Cr 1.34, CO2 14, WBC 17, Hgb 7.6, BNP 2400, ABG 7.11/pCO2 48/PO2 133, trop negative. CXR with mild edema noted       Pt was not responsive to  commands on admission, although he was on propofol. Doppler pressure was initially 80s and dropped to high 50s, thus Levophed started. Pupils were unequal with right pupil large and blown (about 6mm) while left pupil was 3mm. CT head ordered stat which was largely unremarkable. Stroke team consulted

## 2022-02-14 NOTE — HPI
Saba Lott is a 56 y.o. male with a significant medical history of HTN, dilated cardiomyopathy s/p LVAD (on Coumadin), recent admission for sepsis, recent L ICH, hx R PCA stroke who presents to the hospital as a transfer from [facility] for LVAD management.  HPI information gathered from review of the patient's medical record due to the patient currently being intubated and nonresponsive.  The patient presented to the OSH complaining of SOB.  He was subsequently intubated.  His LVAD was noted to be having frequent low flow alarms.  A CTH was obtained at that facility a revealed findings of multiple areas of encephalomalacia but no acute findings.  He was transferred to [facility] service for further evaluation and management.    On arrival to this facility the patient was noted to have unequal pupils by his primary team, R 6mm/L 4mm.  Vascular Neurology was consulted.  Currently the patient is intubated and on propofol.  Propofol was briefly stopped that the patient's exam remained unchanged, with exception of frequent coughing.  The patient was taken to CT for a CTH that is negative for acute findings.  In CT the patient's pupils were assessed and appeared to be equal at 6mm.  On return to his room the patient's pupils were reassessed and were again unequal, R 6mm/L 4mm both sluggish.  He has no movement in any of his extremities.  NIHSS 30.
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
## 2022-02-14 NOTE — PROGRESS NOTES
to see pt in order to assess needs given admission.  The pt is well known to Heart Transplant Social Workers from multiple past hospital admissions.  Pt received his LVAD on 2/6/20 and lives on the same property with his parents.      Emergency Contacts:   Maryse Lott (mother) [phone]  Frank Holbrook (sister) [phone]  Henry Lott (dad) [phone]  Mickey Lott (brother) [phone]     Pt last discharged was on 1/21/22.  Pt is on the vent and is not able to communicate with team.  Worker spoke to the pt's mother, brother, ex-wife and daughters this morning, all appear alert and oriented.   Family appears to have a good understanding of the pt's medical decline and reported to worker that the pt will pass away soon.    Family reports no additional needs to  at this time.    Support provided.  [facility] Transplant Social Workers continue to be available as needed.
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
## 2022-02-15 ENCOUNTER — TELEPHONE (OUTPATIENT)
Dept: TRANSPLANT | Facility: CLINIC | Age: 57
End: 2022-02-15
Payer: MEDICARE

## 2022-02-15 PROCEDURE — 99238 PR HOSPITAL DISCHARGE DAY,<30 MIN: ICD-10-PCS | Mod: ,,, | Performed by: INTERNAL MEDICINE

## 2022-02-15 PROCEDURE — 1111F PR DISCHARGE MEDS RECONCILED W/ CURRENT OUTPATIENT MED LIST: ICD-10-PCS | Mod: CPTII,,, | Performed by: INTERNAL MEDICINE

## 2022-02-15 PROCEDURE — 1111F DSCHRG MED/CURRENT MED MERGE: CPT | Mod: CPTII,,, | Performed by: INTERNAL MEDICINE

## 2022-02-15 PROCEDURE — 99238 HOSP IP/OBS DSCHRG MGMT 30/<: CPT | Mod: ,,, | Performed by: INTERNAL MEDICINE

## 2022-02-15 NOTE — SIGNIFICANT EVENT
2022  6:41 PM    Was called at 1835 due to asystole rhythm on tele monitoring. Patient is DNR.     On arrival, tele monitoring shows asystole and no RR.    Exam:  Patient is unresponsive to verbal and tactile stimuli  No breath sounds are heart  No heart sounds are heard  Patient is absent pulses in the carotid and femoral areas  Pupils fixed without response to bright light  Absent corneal reflex  Absent gag reflex    I pronounced the patient  at 1838. Cause of death was cardiopulmonary arrest.    Lalito Houston  Page # [phone]  Cardiovascular Fellow  [facility]

## 2022-02-15 NOTE — HOSPITAL COURSE
56-year-old black male previously underwent LVAD placement for dilated cardiomyopathy and stage D congestive heart failure.  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  He is not a candidate for pump exchange or transplant.  He had a left intracerebral hemorrhage as well as a history of a prior right PCA stroke.  He came into the hospital with cardiogenic shock and essentially no flow from his pump (0.5 L) consistent with pump thrombosis.  He also has acute renal failure as one would expect with this degree of cardiogenic shock.  We spoke with Solitario Holbrook (healthcare power of ) and discussed his condition and diagnosis.  He is not a candidate for further advanced options.  His family came to bedside and he was made a DNR and comfort measure/withdrawal of care initiated.  Patient passed away peacefully at 1838 on 2/14/22

## 2022-02-15 NOTE — DISCHARGE SUMMARY
[facility] - Cardiac Intensive Care  Heart Transplant  Discharge Summary      Patient Name: Saba Lott  MRN: [id number]  Admission Date: 2/14/2022  Hospital Length of Stay: 1 days  Discharge Date and Time: 02/15/2022 7:46 AM  Attending Physician: No att. 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    Pt was admitted in Jan for MRSA bacteremia and Pseudomonas from a small new open epigastric wound. Admitted to noncompliance with suppressive Doxy PTA. ID followed patient carefully, and antibiotics were adjusted. CT c/a/p on 1/2 showed no abscess. TTE on 1/11 with worsened MR but no vegetations. 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 Int Cards was consulted - no intervention needed. Incidental finding of asymptomatic Covid on 1/16. Coumadin held and had to get Vit K a couple of times for INR 3 or greated (goal 1.5-2.0 given recent ICH). Blood cxs cleared as of 1/3, and a tunneled cath was placed for home IV antibiotics. Discharged home on IV Zosyn and Dapto with estimated end date 2/14. Palliative Care continues to see patient. He is not interested in DNR/DNI or Hospice at this time.    Pt is now presenting today to OSH via EMS in respiratory distress. Per EMS pt was SOB for 2-3 hrs prior to arrival and was speaking in one word sentences.    Of note pt is followed by HH and there was concern of non-compliance and that pt was not taking his prescribed ABX.    CT Head at Osh showed a prominent, remote appearing b/l basal ganglia lacunar infarcts with areas of  encephalomalacia b/l in occipital lobes and in posterior left parietal lobe. Small area of encephalomalacia vs. Slight edema in anterolateral left frontal lobe. Chronic microvascular change.LA noted to be >10, K 6.7, Cr 1.34, CO2 14, WBC 17, Hgb 7.6, BNP 2400, ABG 7.11/pCO2 48/PO2 133, trop negative. CXR with mild edema noted    Pt was not responsive to commands on admission, although he was on propofol. Doppler pressure was initially 80s and dropped to high 50s, thus Levophed started. Pupils were unequal with right pupil large and blown (about 6mm) while left pupil was 3mm. CT head ordered stat which was largely unremarkable. Stroke team consulted     Hospital Course: 56-year-old black male previously underwent LVAD placement for dilated cardiomyopathy and stage D congestive heart failure.  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  He is not a candidate for pump exchange or transplant.  He had a left intracerebral hemorrhage as well as a history of a prior right PCA stroke.  He came into the hospital with cardiogenic shock and essentially no flow from his pump (0.5 L) consistent with pump thrombosis.  He also has acute renal failure as one would expect with this degree of cardiogenic shock.  We spoke with Solitario Holbrook (healthcare power of ) and discussed his condition and diagnosis.  He is not a candidate for further advanced options.  His family came to bedside and he was made a DNR and comfort measure/withdrawal of care initiated.  Patient passed away peacefully at 1838 on 2/14/22      Goals of Care Treatment Preferences:  Code Status: DNR    Health care agent: mehran holbrook  [facility] agent number:           What is most important right now is to focus on remaining as independent as possible.         Consults (From admission, onward)        Status Ordering Provider     Inpatient consult to Infectious Diseases  Once        Provider:   (Not yet assigned)    Completed FER DAVID          Significant Diagnostic Studies: see chart for full details    Pending Diagnostic Studies:     None        Final Active Diagnoses:    Diagnosis Date Noted POA    PRINCIPAL PROBLEM:  LVAD (left ventricular assist device) present [Z95.811] 2020 Not Applicable    Cardiogenic shock [R57.0] 01/15/2020 Unknown    Anisocoria [H57.02] 2022 Yes    Acute renal failure with tubular necrosis [N17.0]  Unknown    NICM (nonischemic cardiomyopathy) [I42.8]  Unknown    End of life care [Z51.5]  Not Applicable      Problems Resolved During this Admission:      Discharged Condition:     Disposition:     Follow Up:    Patient Instructions:   No discharge procedures on file.  Medications: N/A    CONG Peterson  Heart Transplant  [facility]

## 2022-02-15 NOTE — PROCEDURES
Saba Lott is a 56 y.o. male patient.    Temp: 97.7 °F (36.5 °C) (02/14/22 1700)  Pulse: (S) (!) 0 (02/14/22 1840)  Resp: 20 (02/14/22 1840)  BP: (!) 56/38 (02/14/22 1500)  SpO2: (!) 10 % (02/14/22 1840)  Weight: 90.7 kg (199 lb 15.3 oz) (02/14/22 0514)    Procedures    TXP LVAD INTERROGATIONS 2/14/2022 2/14/2022 2/14/2022 2/14/2022 2/14/2022 2/14/2022 2/14/2022   Type HeartMate3 HeartMate3 HeartMate3 HeartMate3 HeartMate3 HeartMate3 HeartMate3   Flow - 0.9 0.9 0.9 1 0.9 0.9   Speed 5300 5300 5300 5300 5300 5350 5300   PI - 2.7 2.6 2.7 2.6 2.7 2.8   Power (Centeno) - 3 3 3 3.1 3.1 3   LSL 4900 4900 4900 4900 4900 4900 4900   Pulsatility Pulse Pulse Pulse Pulse Pulse Pulse Pulse   Interrogation of Ventricular assist device was performed with physician analysis of device parameters and review of device function. I have personally reviewed the interrogation findings and agree with findings as stated.     2/14/2022

## 2022-02-15 NOTE — TELEPHONE ENCOUNTER
"2/13 23:42: STAT page received for Dr. Varghese (spelling?) at [facility]. Pt presented via EMS. MD states that EMS was called for patient c/o SOB. When EMS arrived, patient only spoke one word saying "Dorian." Pt was taken to local ER and immediately intubated to protect airway. Pt VAD is alarming LFA, flow 1.6. STAT transfer to [facility] started. I asked for CT head if patient is stable as pt has history of brain bleed.   Update: CT head negative. Transfer has been initiated via [facility].     Report given to Dr. Jose Avalos. I spoke with PARKER Diaz with [facility] and report taken/ given. Pt remains critically stable. VAD still alarming LFA.     2/14 04:00: Pt arrived to [facility] 3090. Report taken from ICU RN. Pt LFA ongoing with flow now 0.6. Fellow at bedside. VAD Coordinator remains available.   "

## 2022-02-15 NOTE — NURSING
Phone call received from galen from [facility].   Per rolando aaron to release body to . Not a candidate for eye donation.

## 2022-02-16 ENCOUNTER — DOCUMENT SCAN (OUTPATIENT)
Dept: HOME HEALTH SERVICES | Facility: HOSPITAL | Age: 57
End: 2022-02-16
Payer: MEDICARE

## 2022-02-18 LAB
BACTERIA BLD CULT: ABNORMAL

## 2022-02-22 NOTE — PHYSICIAN QUERY
PT Name: Saba Lott  MR #: [id number]     DOCUMENTATION CLARIFICATION      CDS/: Cristal Fernandes RN              Contact information: [email or web address]  This form is a permanent document in the medical record.    Query Date: February 22, 2022    By submitting this query, we are merely seeking further clarification of documentation to reflect the severity of illness of your patient. Please utilize your independent clinical judgment when addressing the question(s) below.     The Medical Record contains the following:   Indicators   Supporting Clinical Findings Location in Medical Record   x Documentation/History of condition Hypoxic Respiratory Failure  -Pt currently intubated and sedated.  -AC/VC 20/400/5/60%       present as a transfer from an OSH for low flow alarms and hypoxic respiratory failure requiring intubation.   H&P 2/14       Cardiology        H&P 2/14       Cardiology    Lab Value(s)      Radiology Findings      Treatment/Medication     x Other Pt is now presenting today to OSH via EMS in respiratory distress. Per EMS pt was SOB for 2-3 hrs prior to arrival and was speaking in one word sentences.      Resp:  [28-32] 31  SpO2:  [96 %-100 %] 97 % H&P 2/14       Cardiology        H&P 2/14       Cardiology      Provider, please specify the acuity/chronicity of ____Hypoxic Respiratory Failure________________:    [xx  ] Acute     [   ] Other (please specify): _______________     [   ] Clinically Undetermined           Please document in your progress notes daily for the duration of treatment until resolved, and include in your discharge summary.  Form No. [id number]

## 2022-02-22 NOTE — PHYSICIAN QUERY
PT Name: Saba Lott  MR #: [id number]     DOCUMENTATION CLARIFICATION      CDS/: Cristal Fernandes RN              Contact information: [email or web address]    This form is a permanent document in the medical record.     Query Date: February 22, 2022    Dear Provider,  By submitting this query, we are merely seeking further clarification of documentation.  Please utilize your independent clinical judgment when addressing the question(s) below.     The Medical Record contains the following:    Supporting Clinical Findings Location in Medical Record   Hypoglycemia  -Likely in the context of sepsis  -willl start D10 gtt    History of MRSA DLES/ MRSA Bacteremia/History of abdominal pseudomonas infection  -Pt on chronic daptomycin and zosyn per ID from last admission which was supposed to be completed on 2/14/22.  -Pt non-compliant with ABX  -BCX ordered  -ID consulted placed for further recommendations  -Will continue Zosyn/Dapto at this time        ID consulted for antibiotic recs in pt with hx of MRSA bacteremia and pseudomonas DLES infection on daptomycin and zosyn.     Made DNR and transitioned to comfort measures today.  ID will sign off.   H&P 2/14       Cardiology      H&P 2/14       Cardiology                    Consult 2/14       Infectious Disease   Vital Signs (24h Range):  Temp:  [97.4 °F (36.3 °C)] 97.4 °F (36.3 °C)  Pulse:  [100-110] 109  Resp:  [28-32] 31  SpO2:  [96 %-100 %] 97 %  BP: (69-91)/(47-70) 91/54   H&P 2/14       Cardiology   WBC 18.22 (H)    Lactate  > 12.0      7.6   Lab 2/14    Lab 2/14   Blood Culture, Routine  Abnormal   ROSA ALBICANS Lab 2/14     Please clarify if the _______sepsis____________________ diagnosis has been:    [ xx ] Ruled In     [  ] Ruled Out     [  ] Still to be ruled out at the time of discharge     [  ] Other/Clarification of findings (please specify): _______________      [   ] Clinically undetermined           Please document in your progress notes daily for  the duration of treatment, until resolved, and include in your discharge summary.    Form No. [id number]

## 2022-02-22 NOTE — PHYSICIAN QUERY
PT Name: Saba Lott  MR #: [id number]     DOCUMENTATION CLARIFICATION     CDS/: Cristal Fernandes RN             Contact information: [email or web address]  This form is a permanent document in the medical record.     Query Date: February 22, 2022    By submitting this query, we are merely seeking further clarification of documentation.  Please utilize your independent clinical judgment when addressing the question(s) below.    The Medical Record contains the following   Indicators Supporting Clinical Findings Location in Medical Record   x Heart Failure documented 56-year-old black male previously underwent LVAD placement for dilated cardiomyopathy and stage D congestive heart failure.   Discharge Summary 2/15       Transplant Heart   x BNP BNP 2400 H&P 2/14       Cardiology     x EF/Echo LVIDd 6.60 cm  The left ventricle is moderately enlarged with eccentric hypertrophy and severely decreased systolic function.  There is an LVAD present. Base speed is 5300 RPMs. The pump type is a Heartmate III. The interventricular septum appears midline. The aortic valve opens intermittently.  Severe left atrial enlargement.  The estimated ejection fraction is 25%.  Left ventricular diastolic dysfunction.  Tachycardia was present  Normal right ventricular size.  Right ventricle not well visualized due to poor acoustic window.  Poor endocardial visualization  Mild tricuspid regurgitation.     Previous Encounter     Echo 1/11/22   x Radiology findings Cardiac silhouette is enlarged.There is bilateral increased interstitial and parenchymal attenuation may relate to edema or infection.No large volume of pleural fluid or pneumothorax identified.   CXR 2/14   x Subjective/Objective Respiratory Conditions  present as a transfer from an OSH for low flow alarms and hypoxic respiratory failure requiring intubation.    H&P 2/14       Cardiology    Recent/Current MI     x Heart Transplant, LVAD LVAD (left ventricular assist device)  dysfunction  Cardiogenic shock   H&P 2/14       Cardiology   x Edema, JVD B/l edema noted H&P 2/14       Cardiology    Ascites     x Diuretics/Meds - 2.5 and Lasix 80 IV ordered. Low dose Levophed also started given dopple pressure in high 50s on admission H&P 2/14       Cardiology    Other Treatment      Other       Heart failure is a clinical diagnosis which includes symptomatic fluid retention, elevated intracardiac pressures, and/or the inability of the heart to deliver adequate blood flow.    Heart Failure with reduced Ejection Fraction (HFrEF) or Systolic Heart Failure (loses ability to contract normally, EF is <40%)    Heart Failure with preserved Ejection Fraction (HFpEF) or Diastolic Heart Failure (stiff ventricles, does not relax properly, EF is >50%)     Heart Failure with Combined Systolic and Diastolic Failure (stiff ventricles, does not relax properly and EF is <50%)    Mid-range or mildly reduced ejection fraction (HFmrEF) is classified as systolic heart failure.   Common clues to acute exacerbation:  Rapidly progressive symptoms (w/in 2 weeks of presentation), using IV diuretics, using supplemental O2, pulmonary edema on Xray, new or worsening pleural effusion, +JVD or other signs of volume overload, MI w/in 4 weeks, and/or BNP >500  The clinical guidelines noted are only system guidelines, and do not replace the providers clinical judgment.    Provider, please specify the type and acuity of the Heart Failure diagnosis associated with the above clinical findings.    [   ]  Acute on Chronic Systolic Heart Failure (HFrEF or HFmrEF) - worsening of CHF signs/symptoms in preexisting CHF   [ xxx  ]  Acute on Chronic Combined Systolic and Diastolic Heart Failure - worsening of CHF signs/symptoms in preexisting CHF   [   ]  Other (please specify): ___________________________________     [  ]  Clinically Undetermined       Please document in your progress notes daily for the duration of treatment until  resolved and include in your discharge summary.    References:  American Heart Association editorial staff. (2017, May). Ejection Fraction Heart Failure Measurement. American Heart Association. https://www.heart.org/en/health-topics/heart-failure/diagnosing-heart-failure/ejection-fraction-heart-failure-measurement#:~:text=Ejection%20fraction%20(EF)%20is%20a,pushed%20out%20with%20each%20heartbeat  FLORA Montez (2020, December 15). Heart failure with preserved ejection fraction: Clinical manifestations and diagnosis. [employer]. https://www.[employer].Canpages/contents/heart-failure-with-preserved-ejection-fraction-clinical-manifestations-and-diagnosis.  ICD-10-CM/PCS Coding Clinic Third Quarter ICD-10, Effective with discharges: September 8, 2020 [facility] § Heart failure with mid-range or mildly reduced ejection fraction (2020).  Form No. [id number]

## 2022-02-23 LAB
FUNGAL SUSC PNL ISLT: ABNORMAL
SPECIMEN SOURCE AND ORGANISM ID: ABNORMAL

## 2022-03-11 ENCOUNTER — DOCUMENT SCAN (OUTPATIENT)
Dept: HOME HEALTH SERVICES | Facility: HOSPITAL | Age: 57
End: 2022-03-11
Payer: MEDICARE

## 2022-08-14 NOTE — SUBJECTIVE & OBJECTIVE
Hospitalist Progress Note   Admit Date:  2022  1:43 AM   Name:  Scarlet Carrion   Age:  79 y.o. Sex:  female  :  1927   MRN:  [id number]   Room:  737/01    Presenting Complaint: Fall     Reason(s) for Admission: Cerebral brain hemorrhage [facility]) [I61.9]     Hospital Course & Interval History:   Patient is a 78 y/o female with past medical history hypertension, atrial fibrillation on Eliquis, DM II, diastolic dysfunction, hypothyroidism s/p thyroidectomy who presented from SNF to ED after fall with laceration to head. ED workup: steri-strips placed to right temporal laceration, CT head with 6 mm hemorrhagic contusion to right parietal lobe. Neurosurgery was contacted by ED who recommended hospital observation with repeat CT imaging. Eliquis was held. Patient was admitted to ICU until repeat CT imaging at 24 hours which remained unchanged. Patient was then transferred to floor. Found with ESBL UTI with antibiotics adjusted  based on finalized urine culture. Lethargy  for which head CT was obtained, neg for acute abnormality, R parietal hemorrhage not visualized. Echocardiogram was obtained due to elevated pBNP and CXR findings of pulmonary edema, LVEF now 35-40% from 50-55% 2 years prior. Tolerating low-dose diuresis with good UOP. Barr placed for urinary retention, voiding trial . Neurology consulted and MRI brain obtained  due to lower extremity weakness, found with acute left frontal lobe CVA. MRA H/N obtained and pending although would likely not be surgical candidate if stenosis is present. Known atrial fibrillation but unable to resume AC due to intracranial hemorrhage but will initiate asa 81 mg and statin therapy. Not a TPA candidate due to intracranial hemorrhage. D/w patient's daughter today, she is requesting to have hospice evaluate patient and considering comfort measures      Subjective/24hr Events (22): \"Thank you. \"  Pt has garbled speech in this Interval History: CVP 14 remains on tube feeds    Continuous Infusions:   sodium chloride 0.9% 10 mL/hr at 02/07/20 1632    dexmedetomidine (PRECEDEX) infusion Stopped (02/10/20 0800)    dextrose 5 % and 0.45 % NaCl with KCl 40 mEq 10 mL/hr at 02/07/20 1632    DOBUTamine 5 mcg/kg/min (02/11/20 0600)    epinephrine 0.05 mcg/kg/min (02/11/20 0630)    furosemide (LASIX) 2 mg/mL continuous infusion (non-titrating) 20 mg/hr (02/11/20 0600)    heparin (porcine) in 5 % dex 1,100 Units/hr (02/11/20 0630)    nicardipine 2.5 mg/hr (02/11/20 0600)    nitric oxide gas       Scheduled Meds:   amLODIPine  5 mg Oral Daily    aspirin  325 mg Per NG tube Daily    diphenhydrAMINE        docusate sodium  100 mg Oral BID    ferrous gluconate  324 mg Oral Daily with breakfast    magnesium oxide  800 mg Oral TID    mupirocin   Nasal BID    pantoprazole  40 mg Intravenous Daily    polyethylene glycol  17 g Oral Daily    potassium chloride 10%  20 mEq Per NG tube BID    sodium chloride 0.9%  10 mL Intravenous Q6H     PRN Meds:albumin human 5%, albuterol sulfate, bisacodyL, Dextrose 10% Bolus, Dextrose 10% Bolus, Dextrose 10% Bolus, Dextrose 10% Bolus, Dextrose 10% Bolus, diphenhydrAMINE, fentaNYL, glucagon (human recombinant), hydrALAZINE, insulin aspart U-100, magnesium hydroxide 400 mg/5 ml, magnesium sulfate IVPB, oxyCODONE, oxyCODONE, potassium chloride 10%, potassium chloride 10%, potassium chloride 10%, potassium chloride 10%, sodium chloride 0.9%, Flushing PICC Protocol **AND** sodium chloride 0.9% **AND** sodium chloride 0.9%    Review of patient's allergies indicates:   Allergen Reactions    Iodine and iodide containing products      Objective:     Vital Signs (Most Recent):  Temp: 98.7 °F (37.1 °C) (02/11/20 0700)  Pulse: 105 (02/11/20 0715)  Resp: 18 (02/11/20 0715)  BP: (!) 88/0 (02/11/20 0722)  SpO2: 99 % (02/11/20 0300) Vital Signs (24h Range):  Temp:  [97.9 °F (36.6 °C)-98.7 °F (37.1 °C)] 98.7 °F (37.1  °C)  Pulse:  [] 105  Resp:  [13-42] 18  SpO2:  [99 %-100 %] 99 %  BP: ()/(0-77) 88/0  Arterial Line BP: ()/(63-81) 89/75     Patient Vitals for the past 72 hrs (Last 3 readings):   Weight   02/11/20 0500 104 kg (229 lb 4.5 oz)   02/10/20 1300 119 kg (262 lb 5.6 oz)     Body mass index is 35.91 kg/m².      Intake/Output Summary (Last 24 hours) at 2/11/2020 0742  Last data filed at 2/11/2020 0700  Gross per 24 hour   Intake 2304 ml   Output 3826 ml   Net -1522 ml       Hemodynamic Parameters:         Physical Exam   Constitutional: He is oriented to person, place, and time. He appears well-developed and well-nourished.   HENT:   Head: Normocephalic and atraumatic.   Cardiovascular: Normal rate and regular rhythm.   LVAD in place  Right and left mediastinal chest tubes ss output  Introducer in place   Pulmonary/Chest: Effort normal. No respiratory distress.   NC   Abdominal: Soft.   Genitourinary:   Genitourinary Comments: Mc cath in place   Neurological: He is alert and oriented to person, place, and time.   Skin: Skin is warm and dry.   Psychiatric: He has a normal mood and affect. His behavior is normal.   Nursing note and vitals reviewed.      Significant Labs:  CBC:  Recent Labs   Lab 02/10/20  0401 02/10/20  1615 02/11/20  0404   WBC 17.80* 19.67* 20.42*   RBC 3.62* 3.66* 3.63*   HGB 9.3* 9.5* 9.5*   HCT 30.0* 30.9* 30.3*    254 279   MCV 83 84 84   MCH 25.7* 26.0* 26.2*   MCHC 31.0* 30.7* 31.4*     BNP:  Recent Labs   Lab 02/05/20  0300 02/06/20  0334 02/10/20  0401   BNP 1,865* 1,420* 887*     CMP:  Recent Labs   Lab 02/09/20  0353  02/10/20  0401  02/10/20  2003 02/11/20  0002 02/11/20  0405   *   < > 133*  133*   < > 134* 152* 135*  135*   CALCIUM 9.6   < > 9.6  9.6   < > 9.9 10.0 10.3  10.3   ALBUMIN 2.9*  --  2.8*  2.8*  --   --   --  3.0*   PROT 7.8  --  7.9  7.9  --   --   --  8.6*      < > 143  143   < > 143 146* 145  145   K 4.3   < > 3.8  3.8   < > 4.0  4.1 3.8  3.8   CO2 25   < > 25  25   < > 29 26 30*  30*      < > 103  103   < > 103 106 103  103   BUN 41*   < > 49*  49*   < > 49* 48* 49*  49*   CREATININE 1.3   < > 1.3  1.3   < > 1.3 1.2 1.2  1.2   ALKPHOS 99  --  107  107  --   --   --  130   ALT 20  --  23  23  --   --   --  34   AST 52*  --  41*  41*  --   --   --  53*   BILITOT 1.1*  --  1.4*  1.4*  --   --   --  1.4*    < > = values in this interval not displayed.      Coagulation:   Recent Labs   Lab 02/09/20  0803  02/10/20  0401  02/10/20  1300 02/10/20  2003 02/11/20  0405   INR 1.2  --  1.1  --   --   --  1.2   APTT 31.3   < > 30.4  30.4   < > 40.2* 43.7* 49.5*  49.5*    < > = values in this interval not displayed.     LDH:  Recent Labs   Lab 02/09/20  0353 02/10/20  0401 02/11/20  0405   * 446* 434*     Microbiology:  Microbiology Results (last 7 days)     Procedure Component Value Units Date/Time    Blood culture [[id number]] Collected:  02/03/20 0946    Order Status:  Completed Specimen:  Blood from Peripheral, Hand, Right Updated:  02/08/20 1022     Blood Culture, Routine No growth after 5 days.    Blood culture [[id number]] Collected:  02/03/20 0946    Order Status:  Completed Specimen:  Blood from Peripheral, Hand, Right Updated:  02/08/20 1022     Blood Culture, Routine No growth after 5 days.          I have reviewed all pertinent labs within the past 24 hours.    Estimated Creatinine Clearance: 80 mL/min (based on SCr of 1.2 mg/dL).    Diagnostic Results:  I have reviewed and interpreted all pertinent imaging results/findings within the past 24 hours.   [facility])  Active Problems:    Closed head injury    UTI due to extended-spectrum beta lactamase (ESBL) producing Escherichia coli    Atrial fibrillation ([facility]    Late onset Alzheimer's dementia without behavioral disturbance (HCC)    HFrEF (heart failure with reduced ejection fraction) ([facility]    History of subarachnoid hemorrhage    Cerebrovascular disease    Type 2 diabetes with nephropathy ([facility]    Essential hypertension, benign  Resolved Problems:    * No resolved hospital problems. *      Objective:   Patient Vitals for the past 24 hrs:   Temp Pulse Resp BP SpO2   08/14/22 1552 -- 81 18 122/87 97 %   08/14/22 1212 98 °F (36.7 °C) 89 20 119/79 94 %   08/14/22 0738 98 °F (36.7 °C) 90 20 108/77 92 %   08/14/22 0350 -- -- -- -- 95 %   08/14/22 0330 98.6 °F (37 °C) 94 17 112/74 97 %   08/14/22 0247 -- 89 -- -- --   08/13/22 2350 97.5 °F (36.4 °C) 95 17 114/84 97 %   08/13/22 2003 98.6 °F (37 °C) 65 17 98/83 97 %   08/13/22 1649 -- -- -- -- 91 %         Oxygen Therapy  SpO2: 97 %  Pulse Oximetry Type: Continuous  Pulse via Oximetry: 93 beats per minute  Pulse Oximeter Device Mode: Intermittent  Pulse Oximeter Device Location: Finger  O2 Device: None (Room air)  Oximetry Probe Site Changed: Yes  Skin Assessment: Clean, dry, & intact  Skin Protection for O2 Device: N/A  O2 Flow Rate (L/min): 2 L/min    Estimated body mass index is 23.72 kg/m² as calculated from the following:    Height as of this encounter: 5' 8\" (1.727 m). Weight as of this encounter: 156 lb (70.8 kg). No intake or output data in the 24 hours ending 08/14/22 1606        Physical Exam:     Blood pressure 122/87, pulse 81, temperature 98 °F (36.7 °C), temperature source Oral, resp. rate 18, height 5' 8\" (1.727 m), weight 156 lb (70.8 kg), SpO2 97 %. General:    Sleepy in NAD  Head:  Normocephalic, atraumatic. Ecchymotic area R temporal area   Eyes:  Sclerae appear normal.  Pupils equally round. ENT:  Nares appear normal, no drainage.   Dry oral mucosa  Neck:  No restricted ROM. Trachea midline   CV:   Irreg rhythm, rate ~controlled No m/r/g. No jugular venous distension. Lungs:   Mild bibasilar crackles. No wheezing, rales. Symmetric expansion. Respirations even   Abdomen: Bowel sounds present. Soft, nontender, nondistended. Extremities: No edema  Skin:     No rashes and normal coloration. Warm and dry. Neuro:  Alert and oriented x 1. Garbled speech, no seizure activities grossly.   Psych:  Calm / sleepy    I have personally reviewed labs and tests showing:  Recent Labs:  Recent Results (from the past 48 hour(s))   POCT Glucose    Collection Time: 08/12/22  4:27 PM   Result Value Ref Range    POC Glucose 127 (H) 65 - 100 mg/dL    Performed by: Johnie    POCT Glucose    Collection Time: 08/12/22  8:12 PM   Result Value Ref Range    POC Glucose 159 (H) 65 - 100 mg/dL    Performed by: Vlad Cortez    Basic Metabolic Panel    Collection Time: 08/13/22  4:45 AM   Result Value Ref Range    Sodium 135 (L) 136 - 145 mmol/L    Potassium 3.8 3.5 - 5.1 mmol/L    Chloride 102 98 - 107 mmol/L    CO2 27 21 - 32 mmol/L    Anion Gap 6 (L) 7 - 16 mmol/L    Glucose 138 (H) 65 - 100 mg/dL    BUN 8 8 - 23 MG/DL    Creatinine 1.00 0.6 - 1.0 MG/DL    GFR African American >60 >60 ml/min/1.73m2    GFR Non- 55 (L) >60 ml/min/1.73m2    Calcium 8.8 8.3 - 10.4 MG/DL   Lipid Panel    Collection Time: 08/13/22  4:45 AM   Result Value Ref Range    Cholesterol, Total 137 <200 MG/DL    Triglycerides 138 35 - 150 MG/DL    HDL 36 (L) 40 - 60 MG/DL    LDL Calculated 73.4 <100 MG/DL    VLDL Cholesterol Calculated 27.6 (H) 6.0 - 23.0 MG/DL    Chol/HDL Ratio 3.8     POCT Glucose    Collection Time: 08/13/22  6:11 AM   Result Value Ref Range    POC Glucose 141 (H) 65 - 100 mg/dL    Performed by: Vlad Cortez    Vitamin D 25 Hydroxy    Collection Time: 08/13/22  9:50 AM   Result Value Ref Range    Vit D, 25-Hydroxy 50.3 30.0 - 100.0 ng/mL   POCT Glucose Collection Time: 08/13/22 11:48 AM   Result Value Ref Range    POC Glucose 157 (H) 65 - 100 mg/dL    Performed by: RushtonHannahPCT    POCT Glucose    Collection Time: 08/13/22  4:50 PM   Result Value Ref Range    POC Glucose 132 (H) 65 - 100 mg/dL    Performed by: RushtonHannahPCT    POCT Glucose    Collection Time: 08/13/22  9:36 PM   Result Value Ref Range    POC Glucose 124 (H) 65 - 100 mg/dL    Performed by: GilesBonitaPCA    POCT Glucose    Collection Time: 08/14/22  6:44 AM   Result Value Ref Range    POC Glucose 134 (H) 65 - 100 mg/dL    Performed by: GilesBonitaPCA    POCT Glucose    Collection Time: 08/14/22 11:09 AM   Result Value Ref Range    POC Glucose 158 (H) 65 - 100 mg/dL    Performed by: Kanu        I have personally reviewed imaging studies showing: Other Studies:  MRA NECK W CONTRAST   Final Result   1. No acute large vessel occlusion. No significant carotid stenosis identified. 2.  There appears to be high-grade stenoses involving the origins of the   bilateral vertebral arteries. The right vertebral artery is dominant. MRA HEAD WO CONTRAST   Final Result   There is suggestion of a moderate stenosis at the left MCA   bifurcation. Otherwise no significant stenosis or occlusion is seen. MRI BRAIN WO CONTRAST   Final Result   1. Small acute left frontal lobe infarct. There may be an additional punctate   focus of acute infarction within the right frontal lobe. 2. Findings most compatible with advanced chronic small vessel ischemic change. XR CHEST PORTABLE   Final Result   Improving CHF or fluid overload, with mild residual.      CT HEAD WO CONTRAST   Final Result      1. No acute intracranial abnormality. 2. The previously questioned punctate focus of subarachnoid hemorrhage in the   right parietal lobe is not visualized. CT HEAD WO CONTRAST   Final Result   No significant interval change or acute intracranial abnormality.    The suspected punctate hemorrhage in the right parietal lobe is now barely   perceptible. XR CHEST PORTABLE   Final Result   New bilateral pulmonary edema. CHF suspected. CT HEAD WO CONTRAST   Final Result   1. Punctate hyperdensity in a right parietal lobe sulcus near the vertex   unchanged. Although this could represent a tiny subarachnoid hemorrhage, could   also represent a prominent cortical vessel. No areas of new hemorrhage or acute   abnormality. 2. Stable cerebral atrophy and chronic white matter disease. CT HEAD WO CONTRAST   Final Result   Unchanged small right parietal lobe hemorrhage, with no mass effect   or midline shift. CT HEAD WO CONTRAST   Final Result   1. Stable focal density closely associated with a right parietal sulcus which   could represent minimal subarachnoid hemorrhage or a tiny cortical contusion in   the setting of acute trauma. .          This report was made using voice transcription. Despite my best efforts to avoid   any, transcription errors may persist. If there is any question about the   accuracy of the report or need for clarification, then please call [phone], or text me through [employer] for clarification or correction. CT HEAD WO CONTRAST   Final Result   Acute 6 mm hemorrhagic contusion along the right parietal lobe   cortex, without mass effect or midline shift. Dr. Lori Shane verbally notified at   2:36 AM (DC 5).           Current Meds:  Current Facility-Administered Medications   Medication Dose Route Frequency    zinc oxide 40 % paste   Topical 4x Daily PRN    sodium chloride flush 0.9 % injection 30 mL  30 mL IntraVENous PRN    atorvastatin (LIPITOR) tablet 40 mg  40 mg Oral Nightly    aspirin chewable tablet 81 mg  81 mg Oral Daily    furosemide (LASIX) tablet 20 mg  20 mg Oral Daily    potassium bicarb-citric acid (EFFER-K) effervescent tablet 40 mEq  40 mEq Oral BID    metoprolol tartrate (LOPRESSOR) tablet 50 mg  50 mg Oral Q6H levoFLOXacin (LEVAQUIN) 750 MG/150ML infusion 750 mg  750 mg IntraVENous Q48H    metoprolol (LOPRESSOR) injection 5 mg  5 mg IntraVENous Q6H PRN    hydrALAZINE (APRESOLINE) injection 5 mg  5 mg IntraVENous Q8H PRN    benzocaine-menthol (CEPACOL SORE THROAT) lozenge 1 lozenge  1 lozenge Oral Q2H PRN    cetirizine (ZYRTEC) tablet 10 mg  10 mg Oral Daily    levothyroxine (SYNTHROID) tablet 100 mcg  100 mcg Oral QAM AC    memantine (NAMENDA) tablet 10 mg  10 mg Oral BID    trospium (SANCTURA) tablet 20 mg  20 mg Oral Nightly    nitroGLYCERIN (NITRODUR) 0.4 MG/HR 1 patch  1 patch TransDERmal Daily    pantoprazole (PROTONIX) tablet 40 mg  40 mg Oral QAM AC    polyethylene glycol (GLYCOLAX) packet 17 g  17 g Oral Daily    senna (SENOKOT) tablet 17.2 mg  2 tablet Oral Nightly    sodium chloride flush 0.9 % injection 5-40 mL  5-40 mL IntraVENous 2 times per day    sodium chloride flush 0.9 % injection 5-40 mL  5-40 mL IntraVENous PRN    0.9 % sodium chloride infusion   IntraVENous PRN    promethazine (PHENERGAN) tablet 12.5 mg  12.5 mg Oral Q6H PRN    Or    ondansetron (ZOFRAN) injection 4 mg  4 mg IntraVENous Q6H PRN    aluminum & magnesium hydroxide-simethicone (MAALOX) 200-200-20 MG/5ML suspension 30 mL  30 mL Oral Q6H PRN    acetaminophen (TYLENOL) tablet 650 mg  650 mg Oral Q6H PRN    Or    acetaminophen (TYLENOL) suppository 650 mg  650 mg Rectal Q6H PRN    insulin lispro (HUMALOG) injection vial 0-8 Units  0-8 Units SubCUTAneous TID WC    insulin lispro (HUMALOG) injection vial 0-4 Units  0-4 Units SubCUTAneous Nightly    glucose chewable tablet 16 g  4 tablet Oral PRN    dextrose bolus 10% 125 mL  125 mL IntraVENous PRN    Or    dextrose bolus 10% 250 mL  250 mL IntraVENous PRN    glucagon (rDNA) injection 1 mg  1 mg SubCUTAneous PRN    dextrose 10 % infusion   IntraVENous Continuous PRN       Signed:  Juliann De Jesus

## 2022-09-26 NOTE — PROGRESS NOTES
Mom reports some swelling in feet -3/05 started Spironolactone -25mg, verified correct dose of coumadin,    Noted

## 2022-12-02 NOTE — ASSESSMENT & PLAN NOTE
Procedure: Device Interrogation Including analysis of device parameters  Patient continues to remove DLES dressing to scratch DLES  Current Settings: Ventricular Assist Device  Review of device function is stable/unstable    TXP LVAD INTERROGATIONS 1/20/2022 1/19/2022 1/19/2022 1/19/2022 1/19/2022 1/19/2022 1/19/2022   Type HeartMate3 HeartMate3 HeartMate3 HeartMate3 HeartMate3 HeartMate3 HeartMate3   Flow 4.75 4.8 4.7 4.7 4.8 4.4 4.8   Speed 5300 5300 5300 5300 5300 5300 5300   PI 3.1 3.3 3.8 3.7 4.2 4.5 3.2   Power (Centeno) 3.0 3.9 3.9 3.8 3.8 3.7 3.8   LSL 4900 - - 4900 4900 4900 4900   Pulsatility Intermittent pulse Intermittent pulse Intermittent pulse Intermittent pulse No Pulse No Pulse No Pulse      room air

## 2022-12-31 NOTE — PLAN OF CARE
Speech/ language/ cognitive evaluation complete. Pt's cognitive-linguistic skills appear WFL. No additional acute SLP needs at this time. Please re-consult should changes occur.     CHRISTY Baltazar, CCC-SLP  [phone]  1/20/2020     Yes - the patient is able to be screened

## 2023-02-16 NOTE — PROGRESS NOTES
02/15/2020  Abelardo Sepulveda    Current provider:  David Joshi MD      I, Abelardo Sepulveda, rounded on Saba Lott to ensure all mechanical assist device settings (IABP or VAD) were appropriate and all parameters were within limits.  I was able to ensure all back up equipment was present, the staff had no issues, and the Perfusion Department daily rounding was complete.    12:08 AM       Ftsg Text: The defect edges were debeveled with a #15 scalpel blade.  Given the location of the defect, shape of the defect and the proximity to free margins a full thickness skin graft was deemed most appropriate.  Using a sterile surgical marker, the primary defect shape was transferred to the donor site. The area thus outlined was incised deep to adipose tissue with a #15 scalpel blade.  The harvested graft was then trimmed of adipose tissue until only dermis and epidermis was left.  The skin margins of the secondary defect were undermined to an appropriate distance in all directions utilizing iris scissors.  The secondary defect was closed with interrupted buried subcutaneous sutures.  The skin edges were then re-apposed with running  sutures.  The skin graft was then placed in the primary defect and oriented appropriately.

## 2023-02-16 NOTE — PLAN OF CARE
-- DO NOT REPLY / DO NOT REPLY ALL --  -- Message is from Engagement Center Operations (ECO) --    General Patient Message:    The patient friend   will like to speak with the Nurse regarding whether or not we will need to hold the patient insulin for his stress test that will done on Feb. 27th, can you call us to verify  Caller Information       Type Contact Phone/Fax    02/16/2023 08:41 AM CST Phone (Incoming) JORGE GOULD (Emergency Contact) [phone]        Alternative phone number: n/a    Can a detailed message be left? Yes    Message Turnaround: WINORTH:    Refer to site's KB page for routing instructions    Please give this turnaround time to the caller:   \"You can expect to receive a response 2-3 business days after your provider's clinical team reviews the message\"               No significant events throughout the day. Pt AAO. On 4L NC. Nitric restarted @ 5ppm. CVPs remain elevated throughout shift between 19-22. Last SVO2 57% ST 110s. BP WNL. Urine output trended down during the day. Tolvaptan and Diuril given x1 dose. Good appetite. 2 Bms. Gtts: Lasix, , Heparin, KVO. POC for to diurese and right heart cath Monday discussed with patient and family. All concerns addressed. WCTM.         CMICU DAILY GOALS         A: Awake               RASS: Goal - RASS Goal: 0-->alert and calm  Actual - RASS ([place]ill[place] Agitation-Sedation Scale): 0-->alert and calm              Restraint necessity: Clinical Justification: Removing medical devices  B: Breath              SBT: Not intubated   C: Coordinate A & B, analgesics/sedatives              Pain: managed               SAT: Not intubated  D: Delirium              CAM-ICU: Overall CAM-ICU: Negative  E: Early Mobility              MOVE Screen: Pass              Activity: Activity Management: activity adjusted per tolerance  FAS: Feeding/Nutrition              Diet order: Diet/Nutrition Received: 2 gram sodium, low saturated fat/low cholesterol,   Fluid restriction: Fluid Requirement: 1500FR  T: Thrombus              DVT prophylaxis: VTE Required Core Measure: Pharmacological prophylaxis initiated/maintained  H: HOB Elevation              Head of Bed (HOB): HOB at 30-45 degrees  U: Ulcer Prophylaxis              GI: yes  G: Glucose control              managed    S: Skin              Bundle compliance: yes   Bathing/Skin Care: back care, bath, chlorhexidine, bath, complete, dressed/undressed, incontinence care, linen changed, nail care Date: 01/24/2019 day shift  B: Bowel Function              no issues   I: Indwelling Catheters              Mc necessity:      Urethral Catheter 01/15/20 2030 Straight-tip 16 Fr.-Reason for Continuing Urinary Catheterization: Critically ill in ICU requiring intensive monitoring              CVC necessity: Yes               IPAD offered: Yes  D: De-escalation Antibx                 Plan for the day              Diurese  Family/Goals of care/Code Status              Code Status: Full Code                No acute events throughout day, VS and assessment per flow sheet, patient progressing towards goals as tolerated, plan of care reviewed with Saba Lott and family, all concerns addressed, will continue to monitor.

## 2023-04-20 NOTE — TELEPHONE ENCOUNTER
Multiple attempts to contact patient via telephone. Letter placed in mail advising that referral is being closed unless we hear back from patient or referring physician.        English

## 2023-11-09 NOTE — PLAN OF CARE
ID Note:  Attempted X 2 to see patient.  Off floor then meeting with Palliative Care  Will follow up and complete workup tomorrow.     Thank you.   Please call for any questions or concerns.  DOMINGO Cabezas, ANP-C  [phone] pager, Pfrtlyd [phone]   Full/Upper

## 2024-06-08 NOTE — CARE UPDATE
HTS Cross Cover:    Interval Update:    Vitals- remained stable overnight. No pressor requirements  IABP remains 1:1    Hemodynamics:  SVO2: 57 from 59  CVP: 23 from 27  CO: 5.88  CI: 2.53  SVR: 1074.5  UOP:  2.8L since admission last night      Plan:  - Continue current course for now  - day team to possible increase lasix further  - f/u TTE     Discussed with attending on call.     Dominga Laboy MD  Cardiology Fellow  PGY 4  Spectra - # [id number]       Opt out

## 2024-06-27 NOTE — ASSESSMENT & PLAN NOTE
[employer] placed 2019 for primary prevention   Pt and wife came in for nurse visit for education on IM injection administration for Solu-Cortef in the event of adrenal emergency/crisis. 30 mins spent with them. Discussed proper aseptic technique for drawing and administering the med, administration sites discussed. Both pt and wife verbalized understanding and had no further questions at this time.

## 2024-10-14 NOTE — PLAN OF CARE
Problem: Malnutrition  Goal: Improved Nutritional Intake  Outcome: Ongoing, Progressing   Recommendations      1. Change diet to Diabetic 2000 kcal diet. 2. Add Boost Pudding TID.  Goals: 1. Pt's intake meals >75% x 7 days.  Nutrition Goal Status: goal met  Communication of RD Recs: reviewed with physician   I discussed case with infectious disease Dr. Russell.  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  I did send a prescription however when I tried to contact patient I was unable to reach her and I left her message hoping she would call back to be updated on her results as well as new prescription.  Dr. Russell felt that patient could further follow-up with her PCP for reevaluation.

## 2024-12-18 NOTE — ASSESSMENT & PLAN NOTE
Problem: PAIN - ADULT  Goal: Verbalizes/displays adequate comfort level or baseline comfort level  Description: Interventions:  - Encourage patient to monitor pain and request assistance  - Assess pain using appropriate pain scale  - Administer analgesics based on type and severity of pain and evaluate response  - Implement non-pharmacological measures as appropriate and evaluate response  - Consider cultural and social influences on pain and pain management  - Notify physician/advanced practitioner if interventions unsuccessful or patient reports new pain  Outcome: Progressing     Problem: INFECTION - ADULT  Goal: Absence or prevention of progression during hospitalization  Description: INTERVENTIONS:  - Assess and monitor for signs and symptoms of infection  - Monitor lab/diagnostic results  - Monitor all insertion sites, i.e. indwelling lines, tubes, and drains  - Monitor endotracheal if appropriate and nasal secretions for changes in amount and color  - Thomson appropriate cooling/warming therapies per order  - Administer medications as ordered  - Instruct and encourage patient and family to use good hand hygiene technique  - Identify and instruct in appropriate isolation precautions for identified infection/condition  Outcome: Progressing      Managed per primary team  Avoid hypoglycemia

## 2025-07-23 NOTE — TELEPHONE ENCOUNTER
----- Message from Cindy Armstrong RN sent at 8/5/2020  1:14 PM CDT -----  Regarding: Weekly CBC, CMP, Mg, Phos, BNP  STAT  (ph: [phone], f: [phone])   ----- Message -----  From: Shani Cagle RN  Sent: 4/13/2020  12:59 PM CDT  To: [facility]  Subject: Weekly  Labs                                      --DO NOT REPLY - Sent from PACT - If sent to wrong pool, reroute to [facility] Reroute pool --    Message Type:  Refill Medication   Is the medication pended:Yes  Medication name: ALPRAZolam (XANAX) 0.5 MG tablet   zolpidem (AMBIEN) 10 MG tablet   Message: Patient is out of her medication and is calling for a refill  Preferred pharmacy verified, and selected.  [facility]  Call Back #: [phone]   Can a detailed message be left?  Yes - Voicemail   Is the patient OUT of Medication?  Yes: Working Hours: route as HIGH priority according to KB. Patient has been advised the message will be reviewed within 1 business day

## (undated) DEVICE — SEE MEDLINE ITEM 154981

## (undated) DEVICE — SHEATH INTRODUCER 7FR 11CM

## (undated) DEVICE — Device

## (undated) DEVICE — SUT 3-0 CTD VICRYL 27IN PS

## (undated) DEVICE — DRESSING AQUACEL SACRAL 9 X 9

## (undated) DEVICE — DRAPE STERI INCISE 17X23IN

## (undated) DEVICE — SUT PROLENE 4-0 RB-1 BL MO

## (undated) DEVICE — SUT 2/0 36IN ETHIBOND EXCE

## (undated) DEVICE — BOOT AIR FLUID HEEL ADLT STD

## (undated) DEVICE — SET DECANTER MEDICHOICE

## (undated) DEVICE — SEE MEDLINE ITEM 146313

## (undated) DEVICE — SUT 2/0 36IN COATED VICRYL

## (undated) DEVICE — PPM CONVERTORS DRAPE

## (undated) DEVICE — DRAPE SLUSH WARMER WITH DISC

## (undated) DEVICE — BLADE 4 INCH EDGE UN-INS

## (undated) DEVICE — SEE MEDLINE ITEM 156894

## (undated) DEVICE — RETRACTOR OCTOBASE INSERT HOLD

## (undated) DEVICE — TRAY MINOR GEN SURG

## (undated) DEVICE — PAD ADHESIVE TAPE REMOVER

## (undated) DEVICE — CATH THORACIC 32FR ST

## (undated) DEVICE — DRAIN CHEST DRY SUCTION

## (undated) DEVICE — INTRODUCER HEMOSTASIS 7.5F

## (undated) DEVICE — SUT BONE WAX 2.5 GRMS 12/BX

## (undated) DEVICE — HEMOSTAT SURGICEL NU-KNIT 6X9

## (undated) DEVICE — PACK SET UP CONVERTORS

## (undated) DEVICE — CATH SWAN GANZ STND 7FR

## (undated) DEVICE — KIT PROBE COVER WITH GEL

## (undated) DEVICE — SPONGE LAP 18X18 PREWASHED

## (undated) DEVICE — KIT SAHARA DRAPE DRAW/LIFT

## (undated) DEVICE — SWABSTICK BENZOIN 4 IN

## (undated) DEVICE — PAD RADIOLUCENT STAT ADULT

## (undated) DEVICE — ELECTRODE REM PLYHSV RETURN 9

## (undated) DEVICE — SUT PROLENE 5-0 BL C-1 4-24

## (undated) DEVICE — KIT MICROINTRODUCE MINI 5X10CM

## (undated) DEVICE — BOOT SUTURE AID

## (undated) DEVICE — PAD DEFIB CADENCE ADULT R2

## (undated) DEVICE — APPLICATOR CHLORAPREP ORN 26ML

## (undated) DEVICE — CONTAINER SPECIMEN STRL 4OZ

## (undated) DEVICE — TAPE CLOTH SOFT MEDIPORE 4IN

## (undated) DEVICE — NDL BOX COUNTER

## (undated) DEVICE — KIT WRENCH

## (undated) DEVICE — PACK DRAPE UNIVERSAL CONVERTOR

## (undated) DEVICE — DRAPE SPLIT STERILE

## (undated) DEVICE — SUT VICRYL BR 1 GEN 27 CT-1

## (undated) DEVICE — SEE MEDLINE ITEM 146417

## (undated) DEVICE — STAPLER SKIN PROXIMATE WIDE

## (undated) DEVICE — SOL 9P NACL IRR PIC IL

## (undated) DEVICE — SUT SILK 2-0 SH 18IN BLACK

## (undated) DEVICE — SUT PROLENE 4-0 SH BLU 36IN

## (undated) DEVICE — TRAY HEART

## (undated) DEVICE — CATH THOR STND RGHT ANG 32FR

## (undated) DEVICE — DRESSING TRANS 8X12 TEGADERM

## (undated) DEVICE — PAD K-THERMIA 24IN X 60IN

## (undated) DEVICE — WIPE ESENTA BARR STNG FREE 3ML

## (undated) DEVICE — PACK PACER PERMANENT

## (undated) DEVICE — SEE MEDLINE ITEM 157117

## (undated) DEVICE — SUT 6 18IN STEEL MONO CCS

## (undated) DEVICE — CUTTER LEAD

## (undated) DEVICE — GAUZE SPONGE 4'X4 12 PLY

## (undated) DEVICE — CONNECTOR 1/4X3/16X3/16 Y

## (undated) DEVICE — SEE MEDLINE ITEM 152622

## (undated) DEVICE — VALVE ULTRASITE MALE LUER

## (undated) DEVICE — SUT ETHIBOND XTRA 1 CTX

## (undated) DEVICE — GAUZE SPONGE 4X4 12PLY

## (undated) DEVICE — BLADE SAW STERNAL 5/BX

## (undated) DEVICE — PLEDGET SUT SOFT 3/8X3/16X1/16

## (undated) DEVICE — SOL NS 1000CC

## (undated) DEVICE — GLOVE SURG BIOGEL LATEX SZ 7.5

## (undated) DEVICE — DRESSING TRANS 4X4 TEGADERM

## (undated) DEVICE — KIT LEAD ACCESSORY

## (undated) DEVICE — KIT URINARY CATH URINE METER

## (undated) DEVICE — SEE MEDLINE ITEM 152512

## (undated) DEVICE — DRESSING TRANS 6X8 TEGADERM

## (undated) DEVICE — SHEATH INTRODUCER 9FR 11CM

## (undated) DEVICE — SUT PROLENE 5-0 36IN C-1

## (undated) DEVICE — DRESSING TEGADERM 2 3/8 X 2.75

## (undated) DEVICE — SEE MEDLINE ITEM 146347

## (undated) DEVICE — DRESSING ABSRBNT ISLAND 3.6X8

## (undated) DEVICE — SUT SILK BLK BR. 2 2-60

## (undated) DEVICE — DRAPE STERI INSTRUMENT 1018

## (undated) DEVICE — VAC WOUND DISPOSABLE PREVENA

## (undated) DEVICE — LINE PRESSURE MONITORING 96IN

## (undated) DEVICE — HOLDER TUBE

## (undated) DEVICE — SEE MEDLINE ITEM 152523

## (undated) DEVICE — GLOVE BIOGEL PI MICRO SZ 7.5

## (undated) DEVICE — ADHESIVE DERMABOND ADVANCED

## (undated) DEVICE — TAPE SURG MEDIPORE 6X72IN

## (undated) DEVICE — SEE MEDLINE ITEM 107746

## (undated) DEVICE — DRESSING AQUACEL FOAM 5 X 5

## (undated) DEVICE — ELECTRODE EXTENDED BLADE